# Patient Record
Sex: FEMALE | Race: WHITE | Employment: OTHER | URBAN - METROPOLITAN AREA
[De-identification: names, ages, dates, MRNs, and addresses within clinical notes are randomized per-mention and may not be internally consistent; named-entity substitution may affect disease eponyms.]

---

## 2017-01-12 ENCOUNTER — OFFICE VISIT (OUTPATIENT)
Dept: FAMILY MEDICINE CLINIC | Age: 62
End: 2017-01-12

## 2017-01-12 VITALS
TEMPERATURE: 98.4 F | HEART RATE: 92 BPM | WEIGHT: 207.4 LBS | BODY MASS INDEX: 33.48 KG/M2 | SYSTOLIC BLOOD PRESSURE: 124 MMHG | DIASTOLIC BLOOD PRESSURE: 78 MMHG | OXYGEN SATURATION: 97 %

## 2017-01-12 DIAGNOSIS — Z12.31 ENCOUNTER FOR SCREENING MAMMOGRAM FOR BREAST CANCER: ICD-10-CM

## 2017-01-12 DIAGNOSIS — R53.83 FATIGUE, UNSPECIFIED TYPE: ICD-10-CM

## 2017-01-12 DIAGNOSIS — F41.1 GENERALIZED ANXIETY DISORDER: ICD-10-CM

## 2017-01-12 DIAGNOSIS — Z13.1 SCREENING FOR DIABETES MELLITUS: ICD-10-CM

## 2017-01-12 DIAGNOSIS — Z13.820 SCREENING FOR OSTEOPOROSIS: ICD-10-CM

## 2017-01-12 DIAGNOSIS — F51.04 PSYCHOPHYSIOLOGICAL INSOMNIA: ICD-10-CM

## 2017-01-12 DIAGNOSIS — Z13.6 SCREENING FOR CARDIOVASCULAR CONDITION: ICD-10-CM

## 2017-01-12 DIAGNOSIS — G43.009 MIGRAINE WITHOUT AURA AND WITHOUT STATUS MIGRAINOSUS, NOT INTRACTABLE: Primary | ICD-10-CM

## 2017-01-12 PROCEDURE — 99214 OFFICE O/P EST MOD 30 MIN: CPT | Performed by: FAMILY MEDICINE

## 2017-01-12 RX ORDER — TRAZODONE HYDROCHLORIDE 50 MG/1
50 TABLET ORAL NIGHTLY
Qty: 30 TABLET | Refills: 5 | Status: SHIPPED | OUTPATIENT
Start: 2017-01-12 | End: 2017-07-07 | Stop reason: SDUPTHER

## 2017-01-12 RX ORDER — NADOLOL 20 MG/1
TABLET ORAL
Qty: 60 TABLET | Refills: 5 | Status: SHIPPED | OUTPATIENT
Start: 2017-01-12 | End: 2017-07-13 | Stop reason: SDUPTHER

## 2017-01-12 RX ORDER — LORAZEPAM 0.5 MG/1
0.5 TABLET ORAL EVERY 6 HOURS PRN
Qty: 30 TABLET | Refills: 0 | Status: SHIPPED | OUTPATIENT
Start: 2017-01-12 | End: 2017-10-17 | Stop reason: SDUPTHER

## 2017-01-12 RX ORDER — AMITRIPTYLINE HYDROCHLORIDE 25 MG/1
25 TABLET, FILM COATED ORAL NIGHTLY
Qty: 30 TABLET | Refills: 5 | Status: SHIPPED | OUTPATIENT
Start: 2017-01-12 | End: 2017-07-13 | Stop reason: SDUPTHER

## 2017-01-12 RX ORDER — SERTRALINE HYDROCHLORIDE 100 MG/1
100 TABLET, FILM COATED ORAL DAILY
Qty: 30 TABLET | Refills: 5 | Status: SHIPPED | OUTPATIENT
Start: 2017-01-12 | End: 2017-08-03 | Stop reason: SDUPTHER

## 2017-07-13 DIAGNOSIS — G43.009 MIGRAINE WITHOUT AURA AND WITHOUT STATUS MIGRAINOSUS, NOT INTRACTABLE: ICD-10-CM

## 2017-07-13 RX ORDER — AMITRIPTYLINE HYDROCHLORIDE 25 MG/1
25 TABLET, FILM COATED ORAL NIGHTLY
Qty: 30 TABLET | Refills: 0 | Status: SHIPPED | OUTPATIENT
Start: 2017-07-13 | End: 2017-08-03 | Stop reason: SDUPTHER

## 2017-07-13 RX ORDER — NADOLOL 20 MG/1
TABLET ORAL
Qty: 60 TABLET | Refills: 0 | Status: SHIPPED | OUTPATIENT
Start: 2017-07-13 | End: 2017-08-03 | Stop reason: SDUPTHER

## 2017-08-03 ENCOUNTER — OFFICE VISIT (OUTPATIENT)
Dept: FAMILY MEDICINE CLINIC | Age: 62
End: 2017-08-03

## 2017-08-03 VITALS
HEART RATE: 96 BPM | WEIGHT: 208.4 LBS | SYSTOLIC BLOOD PRESSURE: 122 MMHG | BODY MASS INDEX: 33.64 KG/M2 | DIASTOLIC BLOOD PRESSURE: 62 MMHG | TEMPERATURE: 98 F | OXYGEN SATURATION: 95 %

## 2017-08-03 DIAGNOSIS — R07.9 CHEST PAIN, UNSPECIFIED TYPE: ICD-10-CM

## 2017-08-03 DIAGNOSIS — Z11.59 NEED FOR HEPATITIS C SCREENING TEST: ICD-10-CM

## 2017-08-03 DIAGNOSIS — F51.04 PSYCHOPHYSIOLOGICAL INSOMNIA: ICD-10-CM

## 2017-08-03 DIAGNOSIS — I10 ESSENTIAL HYPERTENSION: Primary | ICD-10-CM

## 2017-08-03 DIAGNOSIS — G43.009 MIGRAINE WITHOUT AURA AND WITHOUT STATUS MIGRAINOSUS, NOT INTRACTABLE: ICD-10-CM

## 2017-08-03 DIAGNOSIS — F32.0 MILD SINGLE CURRENT EPISODE OF MAJOR DEPRESSIVE DISORDER (HCC): ICD-10-CM

## 2017-08-03 DIAGNOSIS — Z11.4 SCREENING FOR HIV (HUMAN IMMUNODEFICIENCY VIRUS): ICD-10-CM

## 2017-08-03 DIAGNOSIS — Z12.31 ENCOUNTER FOR SCREENING MAMMOGRAM FOR BREAST CANCER: ICD-10-CM

## 2017-08-03 DIAGNOSIS — Z13.6 SCREENING FOR CARDIOVASCULAR CONDITION: ICD-10-CM

## 2017-08-03 DIAGNOSIS — E66.01 MORBID OBESITY DUE TO EXCESS CALORIES (HCC): ICD-10-CM

## 2017-08-03 PROCEDURE — 99214 OFFICE O/P EST MOD 30 MIN: CPT | Performed by: FAMILY MEDICINE

## 2017-08-03 RX ORDER — AMITRIPTYLINE HYDROCHLORIDE 25 MG/1
25 TABLET, FILM COATED ORAL NIGHTLY
Qty: 30 TABLET | Refills: 5 | Status: SHIPPED | OUTPATIENT
Start: 2017-08-03 | End: 2018-02-07 | Stop reason: SDUPTHER

## 2017-08-03 RX ORDER — TRAZODONE HYDROCHLORIDE 50 MG/1
TABLET ORAL
Qty: 30 TABLET | Refills: 5 | Status: SHIPPED | OUTPATIENT
Start: 2017-08-03 | End: 2018-02-13 | Stop reason: SDUPTHER

## 2017-08-03 RX ORDER — KETOROLAC TROMETHAMINE 10 MG/1
10 TABLET, FILM COATED ORAL EVERY 6 HOURS PRN
Qty: 30 TABLET | Refills: 5 | Status: SHIPPED | OUTPATIENT
Start: 2017-08-03 | End: 2018-02-13 | Stop reason: SDUPTHER

## 2017-08-03 RX ORDER — SERTRALINE HYDROCHLORIDE 100 MG/1
100 TABLET, FILM COATED ORAL DAILY
Qty: 30 TABLET | Refills: 5 | Status: SHIPPED | OUTPATIENT
Start: 2017-08-03 | End: 2018-02-13 | Stop reason: SDUPTHER

## 2017-08-03 RX ORDER — NADOLOL 20 MG/1
TABLET ORAL
Qty: 60 TABLET | Refills: 5 | Status: SHIPPED | OUTPATIENT
Start: 2017-08-03 | End: 2018-02-07 | Stop reason: SDUPTHER

## 2017-08-03 ASSESSMENT — PATIENT HEALTH QUESTIONNAIRE - PHQ9
1. LITTLE INTEREST OR PLEASURE IN DOING THINGS: 0
SUM OF ALL RESPONSES TO PHQ QUESTIONS 1-9: 0
SUM OF ALL RESPONSES TO PHQ9 QUESTIONS 1 & 2: 0
2. FEELING DOWN, DEPRESSED OR HOPELESS: 0

## 2017-08-04 LAB
COPY(IES) SENT TO:: NORMAL
HEPATITIS C ANTIBODY: NEGATIVE
HIV AG/AB: NORMAL

## 2017-10-06 ENCOUNTER — TELEPHONE (OUTPATIENT)
Dept: FAMILY MEDICINE CLINIC | Age: 62
End: 2017-10-06

## 2017-10-09 NOTE — TELEPHONE ENCOUNTER
The last time she was prescribed lorazepam was was 1/12/17. I cannot refill that without an appointment.

## 2017-10-17 ENCOUNTER — OFFICE VISIT (OUTPATIENT)
Dept: FAMILY MEDICINE CLINIC | Age: 62
End: 2017-10-17

## 2017-10-17 VITALS
HEIGHT: 63 IN | TEMPERATURE: 98.2 F | HEART RATE: 74 BPM | OXYGEN SATURATION: 98 % | BODY MASS INDEX: 36.91 KG/M2 | WEIGHT: 208.34 LBS | DIASTOLIC BLOOD PRESSURE: 88 MMHG | SYSTOLIC BLOOD PRESSURE: 134 MMHG

## 2017-10-17 DIAGNOSIS — L50.9 URTICARIA: ICD-10-CM

## 2017-10-17 DIAGNOSIS — Z48.89 SUTURE CHECK: Primary | ICD-10-CM

## 2017-10-17 DIAGNOSIS — F41.1 GENERALIZED ANXIETY DISORDER: ICD-10-CM

## 2017-10-17 PROCEDURE — S0630 REMOVAL OF SUTURES: HCPCS | Performed by: FAMILY MEDICINE

## 2017-10-17 PROCEDURE — 99214 OFFICE O/P EST MOD 30 MIN: CPT | Performed by: FAMILY MEDICINE

## 2017-10-17 RX ORDER — HYDROXYZINE HYDROCHLORIDE 10 MG/1
10-30 TABLET, FILM COATED ORAL 3 TIMES DAILY PRN
Qty: 90 TABLET | Refills: 0 | Status: SHIPPED | OUTPATIENT
Start: 2017-10-17 | End: 2019-05-02 | Stop reason: SDUPTHER

## 2017-10-17 RX ORDER — LORAZEPAM 0.5 MG/1
0.5 TABLET ORAL EVERY 6 HOURS PRN
Qty: 30 TABLET | Refills: 0 | Status: SHIPPED | OUTPATIENT
Start: 2017-10-17 | End: 2017-11-16

## 2017-10-17 ASSESSMENT — PATIENT HEALTH QUESTIONNAIRE - PHQ9
SUM OF ALL RESPONSES TO PHQ9 QUESTIONS 1 & 2: 0
SUM OF ALL RESPONSES TO PHQ QUESTIONS 1-9: 0
2. FEELING DOWN, DEPRESSED OR HOPELESS: 0
1. LITTLE INTEREST OR PLEASURE IN DOING THINGS: 0

## 2017-10-17 NOTE — PATIENT INSTRUCTIONS
social groups, or volunteer to help others. Being alone sometimes makes things seem worse than they are. · Get at least 30 minutes of exercise on most days of the week to relieve stress. Walking is a good choice. You also may want to do other activities, such as running, swimming, cycling, or playing tennis or team sports. Relaxation techniques  Do relaxation exercises 10 to 20 minutes a day. You can play soothing, relaxing music while you do them, if you wish. · Tell others in your house that you are going to do your relaxation exercises. Ask them not to disturb you. · Find a comfortable place, away from all distractions and noise. · Lie down on your back, or sit with your back straight. · Focus on your breathing. Make it slow and steady. · Breathe in through your nose. Breathe out through either your nose or mouth. · Breathe deeply, filling up the area between your navel and your rib cage. Breathe so that your belly goes up and down. · Do not hold your breath. · Breathe like this for 5 to 10 minutes. Notice the feeling of calmness throughout your whole body. As you continue to breathe slowly and deeply, relax by doing the following for another 5 to 10 minutes:  · Tighten and relax each muscle group in your body. You can begin at your toes and work your way up to your head. · Imagine your muscle groups relaxing and becoming heavy. · Empty your mind of all thoughts. · Let yourself relax more and more deeply. · Become aware of the state of calmness that surrounds you. · When your relaxation time is over, you can bring yourself back to alertness by moving your fingers and toes and then your hands and feet and then stretching and moving your entire body. Sometimes people fall asleep during relaxation, but they usually wake up shortly afterward. · Always give yourself time to return to full alertness before you drive a car or do anything that might cause an accident if you are not fully alert.  Never play a relaxation tape while you drive a car. When should you call for help? Call 911 anytime you think you may need emergency care. For example, call if:  · You feel you cannot stop from hurting yourself or someone else. Keep the numbers for these national suicide hotlines: 2-570-657-TALK (2-363.947.8625) and 5-076-IAOGMOS (3-391.608.4108). If you or someone you know talks about suicide or feeling hopeless, get help right away. Watch closely for changes in your health, and be sure to contact your doctor if:  · You have anxiety or fear that affects your life. · You have symptoms of anxiety that are new or different from those you had before. Where can you learn more? Go to https://Muzicall.Pfenex. org and sign in to your [employer] account. Enter P754 in the Retrevo box to learn more about \"Anxiety Disorder: Care Instructions. \"     If you do not have an account, please click on the \"Sign Up Now\" link. Current as of: July 26, 2016  Content Version: 11.3  © 3282-0977 [address]. Care instructions adapted under license by [facility] ([facility]). If you have questions about a medical condition or this instruction, always ask your healthcare professional. [address] any warranty or liability for your use of this information.

## 2017-12-22 RX ORDER — LORAZEPAM 0.5 MG/1
0.5 TABLET ORAL EVERY 6 HOURS PRN
Qty: 30 TABLET | Refills: 0 | Status: SHIPPED | OUTPATIENT
Start: 2017-12-22 | End: 2018-02-13 | Stop reason: SDUPTHER

## 2018-02-07 DIAGNOSIS — G43.009 MIGRAINE WITHOUT AURA AND WITHOUT STATUS MIGRAINOSUS, NOT INTRACTABLE: ICD-10-CM

## 2018-02-07 DIAGNOSIS — I10 ESSENTIAL HYPERTENSION: ICD-10-CM

## 2018-02-07 RX ORDER — NADOLOL 20 MG/1
TABLET ORAL
Qty: 60 TABLET | Refills: 0 | Status: SHIPPED | OUTPATIENT
Start: 2018-02-07 | End: 2018-02-13 | Stop reason: SDUPTHER

## 2018-02-07 RX ORDER — LORAZEPAM 0.5 MG/1
0.5 TABLET ORAL EVERY 6 HOURS PRN
Qty: 30 TABLET | Refills: 0 | OUTPATIENT
Start: 2018-02-07 | End: 2018-03-09

## 2018-02-07 RX ORDER — AMITRIPTYLINE HYDROCHLORIDE 25 MG/1
25 TABLET, FILM COATED ORAL NIGHTLY
Qty: 30 TABLET | Refills: 0 | Status: SHIPPED | OUTPATIENT
Start: 2018-02-07 | End: 2018-02-13 | Stop reason: SDUPTHER

## 2018-02-13 ENCOUNTER — OFFICE VISIT (OUTPATIENT)
Dept: FAMILY MEDICINE CLINIC | Age: 63
End: 2018-02-13

## 2018-02-13 VITALS
HEART RATE: 94 BPM | SYSTOLIC BLOOD PRESSURE: 128 MMHG | RESPIRATION RATE: 14 BRPM | HEIGHT: 63 IN | WEIGHT: 207 LBS | DIASTOLIC BLOOD PRESSURE: 68 MMHG | BODY MASS INDEX: 36.68 KG/M2 | TEMPERATURE: 97.6 F | OXYGEN SATURATION: 98 %

## 2018-02-13 DIAGNOSIS — F41.9 ANXIETY: ICD-10-CM

## 2018-02-13 DIAGNOSIS — Z13.6 SCREENING FOR CARDIOVASCULAR CONDITION: ICD-10-CM

## 2018-02-13 DIAGNOSIS — Z13.1 SCREENING FOR DIABETES MELLITUS: ICD-10-CM

## 2018-02-13 DIAGNOSIS — F32.0 MILD SINGLE CURRENT EPISODE OF MAJOR DEPRESSIVE DISORDER (HCC): Primary | ICD-10-CM

## 2018-02-13 DIAGNOSIS — G43.009 MIGRAINE WITHOUT AURA AND WITHOUT STATUS MIGRAINOSUS, NOT INTRACTABLE: ICD-10-CM

## 2018-02-13 DIAGNOSIS — I10 ESSENTIAL HYPERTENSION: ICD-10-CM

## 2018-02-13 DIAGNOSIS — F51.04 PSYCHOPHYSIOLOGICAL INSOMNIA: ICD-10-CM

## 2018-02-13 DIAGNOSIS — Z12.31 ENCOUNTER FOR SCREENING MAMMOGRAM FOR BREAST CANCER: ICD-10-CM

## 2018-02-13 PROCEDURE — 99214 OFFICE O/P EST MOD 30 MIN: CPT | Performed by: FAMILY MEDICINE

## 2018-02-13 RX ORDER — AMITRIPTYLINE HYDROCHLORIDE 25 MG/1
25 TABLET, FILM COATED ORAL NIGHTLY
Qty: 30 TABLET | Refills: 5 | Status: SHIPPED | OUTPATIENT
Start: 2018-02-13 | End: 2018-05-25 | Stop reason: SDUPTHER

## 2018-02-13 RX ORDER — SERTRALINE HYDROCHLORIDE 100 MG/1
200 TABLET, FILM COATED ORAL DAILY
Qty: 60 TABLET | Refills: 5 | Status: SHIPPED | OUTPATIENT
Start: 2018-02-13 | End: 2018-05-25 | Stop reason: SDUPTHER

## 2018-02-13 RX ORDER — NADOLOL 20 MG/1
TABLET ORAL
Qty: 60 TABLET | Refills: 5 | Status: SHIPPED | OUTPATIENT
Start: 2018-02-13 | End: 2018-05-25 | Stop reason: SDUPTHER

## 2018-02-13 RX ORDER — TRAZODONE HYDROCHLORIDE 50 MG/1
TABLET ORAL
Qty: 30 TABLET | Refills: 5 | Status: SHIPPED | OUTPATIENT
Start: 2018-02-13 | End: 2018-05-25 | Stop reason: SDUPTHER

## 2018-02-13 RX ORDER — KETOROLAC TROMETHAMINE 10 MG/1
10 TABLET, FILM COATED ORAL EVERY 6 HOURS PRN
Qty: 30 TABLET | Refills: 5 | Status: SHIPPED | OUTPATIENT
Start: 2018-02-13 | End: 2018-05-25 | Stop reason: SDUPTHER

## 2018-02-13 RX ORDER — LORAZEPAM 0.5 MG/1
0.5 TABLET ORAL EVERY 6 HOURS PRN
Qty: 30 TABLET | Refills: 0 | Status: SHIPPED | OUTPATIENT
Start: 2018-02-13 | End: 2018-04-06 | Stop reason: SDUPTHER

## 2018-02-13 ASSESSMENT — PATIENT HEALTH QUESTIONNAIRE - PHQ9
SUM OF ALL RESPONSES TO PHQ9 QUESTIONS 1 & 2: 0
1. LITTLE INTEREST OR PLEASURE IN DOING THINGS: 0
2. FEELING DOWN, DEPRESSED OR HOPELESS: 0
SUM OF ALL RESPONSES TO PHQ QUESTIONS 1-9: 0

## 2018-02-14 NOTE — PROGRESS NOTES
Position: Sitting, Cuff Size: Large Adult)   Pulse 94   Temp 97.6 °F (36.4 °C) (Temporal)   Resp 14   Ht 5' 3\" (1.6 m)   Wt 207 lb (93.9 kg)   SpO2 98%   BMI 36.67 kg/m²    General:  alert, appears stated age and cooperative   Neck: Thyroid not palpable, not enlarged, no nodules detected. Chest: clear with no wheezes or rales. No retractions, or use of accessory muscles noted. Cardiovascular: PMI is not displaced, and no thrill noted. Regular rate and rhythm with no rub, murmur or gallop. No peripheral edema. Pedal pulses are normal.    Affect & Behavior:  full facial expressions, good grooming, good insight, normal perception, normal reasoning, normal speech pattern and content and normal thought patterns        Assessment:         1. Mild single current episode of major depressive disorder (HCC)  sertraline (ZOLOFT) 100 MG tablet   2. Anxiety     3. Psychophysiological insomnia  traZODone (DESYREL) 50 MG tablet   4. Migraine without aura and without status migrainosus, not intractable  ketorolac (TORADOL) 10 MG tablet    amitriptyline (ELAVIL) 25 MG tablet   5. Essential hypertension  nadolol (CORGARD) 20 MG tablet   6. Screening for cardiovascular condition  Lipid Panel   7. Screening for diabetes mellitus  Comprehensive Metabolic Panel   8. Encounter for screening mammogram for breast cancer  Mammography screening bilateral         Plan:      Medications: Elavil. Recommended counseling. Reviewed concept of anxiety as biochemical imbalance of neurotransmitters and rationale for treatment. Instructed patient to contact office or on-call physician promptly should condition worsen or any new symptoms appear and provided on-call telephone numbers. IF THE PATIENT HAS ANY SUICIDAL OR HOMICIDAL IDEATIONS, CALL THE OFFICE, DISCUSS WITH A SUPPORT MEMBER, OR GO TO THE ER IMMEDIATELY. Patient was agreeable with this plan. Follow up: 6 months.      Favian Gurrola received counseling on the following healthy

## 2018-04-06 ENCOUNTER — TELEPHONE (OUTPATIENT)
Dept: FAMILY MEDICINE CLINIC | Age: 63
End: 2018-04-06

## 2018-04-06 RX ORDER — LORAZEPAM 0.5 MG/1
0.5 TABLET ORAL EVERY 6 HOURS PRN
Qty: 30 TABLET | Refills: 1 | Status: SHIPPED | OUTPATIENT
Start: 2018-04-06 | End: 2018-05-25 | Stop reason: SDUPTHER

## 2018-05-25 ENCOUNTER — OFFICE VISIT (OUTPATIENT)
Dept: FAMILY MEDICINE CLINIC | Age: 63
End: 2018-05-25

## 2018-05-25 VITALS
DIASTOLIC BLOOD PRESSURE: 68 MMHG | WEIGHT: 205.8 LBS | HEART RATE: 74 BPM | BODY MASS INDEX: 36.46 KG/M2 | TEMPERATURE: 98.4 F | OXYGEN SATURATION: 98 % | SYSTOLIC BLOOD PRESSURE: 102 MMHG

## 2018-05-25 DIAGNOSIS — F41.9 ANXIETY: ICD-10-CM

## 2018-05-25 DIAGNOSIS — I10 ESSENTIAL HYPERTENSION: Primary | ICD-10-CM

## 2018-05-25 DIAGNOSIS — G43.009 MIGRAINE WITHOUT AURA AND WITHOUT STATUS MIGRAINOSUS, NOT INTRACTABLE: ICD-10-CM

## 2018-05-25 DIAGNOSIS — Z13.6 SCREENING FOR CARDIOVASCULAR CONDITION: ICD-10-CM

## 2018-05-25 DIAGNOSIS — F51.04 PSYCHOPHYSIOLOGICAL INSOMNIA: ICD-10-CM

## 2018-05-25 DIAGNOSIS — Z91.81 AT HIGH RISK FOR FALLS: ICD-10-CM
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
## 2018-05-25 DIAGNOSIS — R53.1 WEAKNESS: ICD-10-CM

## 2018-05-25 DIAGNOSIS — F32.0 MILD SINGLE CURRENT EPISODE OF MAJOR DEPRESSIVE DISORDER (HCC): ICD-10-CM

## 2018-05-25 PROCEDURE — 1123F ACP DISCUSS/DSCN MKR DOCD: CPT | Performed by: FAMILY MEDICINE

## 2018-05-25 PROCEDURE — G8400 PT W/DXA NO RESULTS DOC: HCPCS | Performed by: FAMILY MEDICINE

## 2018-05-25 PROCEDURE — 36415 COLL VENOUS BLD VENIPUNCTURE: CPT | Performed by: FAMILY MEDICINE

## 2018-05-25 PROCEDURE — 4040F PNEUMOC VAC/ADMIN/RCVD: CPT | Performed by: FAMILY MEDICINE

## 2018-05-25 PROCEDURE — 99214 OFFICE O/P EST MOD 30 MIN: CPT | Performed by: FAMILY MEDICINE

## 2018-05-25 PROCEDURE — 1090F PRES/ABSN URINE INCON ASSESS: CPT | Performed by: FAMILY MEDICINE

## 2018-05-25 PROCEDURE — G8427 DOCREV CUR MEDS BY ELIG CLIN: HCPCS | Performed by: FAMILY MEDICINE

## 2018-05-25 PROCEDURE — 1036F TOBACCO NON-USER: CPT | Performed by: FAMILY MEDICINE

## 2018-05-25 PROCEDURE — G8417 CALC BMI ABV UP PARAM F/U: HCPCS | Performed by: FAMILY MEDICINE

## 2018-05-25 PROCEDURE — 3017F COLORECTAL CA SCREEN DOC REV: CPT | Performed by: FAMILY MEDICINE

## 2018-05-25 RX ORDER — AMITRIPTYLINE HYDROCHLORIDE 25 MG/1
25 TABLET, FILM COATED ORAL NIGHTLY
Qty: 30 TABLET | Refills: 5 | Status: SHIPPED | OUTPATIENT
Start: 2018-05-25 | End: 2018-10-12 | Stop reason: SDUPTHER

## 2018-05-25 RX ORDER — SERTRALINE HYDROCHLORIDE 100 MG/1
200 TABLET, FILM COATED ORAL DAILY
Qty: 60 TABLET | Refills: 5 | Status: SHIPPED | OUTPATIENT
Start: 2018-05-25 | End: 2018-10-12 | Stop reason: SDUPTHER

## 2018-05-25 RX ORDER — NADOLOL 20 MG/1
TABLET ORAL
Qty: 60 TABLET | Refills: 5 | Status: SHIPPED | OUTPATIENT
Start: 2018-05-25 | End: 2018-10-12 | Stop reason: SDUPTHER

## 2018-05-25 RX ORDER — LORAZEPAM 0.5 MG/1
0.5 TABLET ORAL EVERY 6 HOURS PRN
Qty: 30 TABLET | Refills: 1 | Status: SHIPPED | OUTPATIENT
Start: 2018-05-25 | End: 2018-10-12 | Stop reason: SDUPTHER

## 2018-05-25 RX ORDER — KETOROLAC TROMETHAMINE 10 MG/1
10 TABLET, FILM COATED ORAL EVERY 6 HOURS PRN
Qty: 30 TABLET | Refills: 5 | Status: SHIPPED | OUTPATIENT
Start: 2018-05-25 | End: 2018-10-12 | Stop reason: SDUPTHER

## 2018-05-25 RX ORDER — MIRTAZAPINE 15 MG/1
15 TABLET, FILM COATED ORAL NIGHTLY
Qty: 30 TABLET | Refills: 5 | Status: SHIPPED | OUTPATIENT
Start: 2018-05-25 | End: 2018-10-12 | Stop reason: SDUPTHER

## 2018-05-25 RX ORDER — TRAZODONE HYDROCHLORIDE 50 MG/1
TABLET ORAL
Qty: 30 TABLET | Refills: 5 | Status: SHIPPED | OUTPATIENT
Start: 2018-05-25 | End: 2018-10-12 | Stop reason: SDUPTHER

## 2018-05-25 ASSESSMENT — PATIENT HEALTH QUESTIONNAIRE - PHQ9
2. FEELING DOWN, DEPRESSED OR HOPELESS: 0
1. LITTLE INTEREST OR PLEASURE IN DOING THINGS: 0
SUM OF ALL RESPONSES TO PHQ9 QUESTIONS 1 & 2: 0
SUM OF ALL RESPONSES TO PHQ QUESTIONS 1-9: 0

## 2018-05-26 LAB
A/G RATIO: 1.5 (CALC) (ref 0.8–2.6)
ALBUMIN SERPL-MCNC: 4.1 GM/DL (ref 3.5–5.2)
ALP BLD-CCNC: 80 U/L (ref 23–144)
ALT SERPL-CCNC: 50 U/L (ref 0–60)
AST SERPL-CCNC: 85 U/L (ref 0–46)
BASOPHILS ABSOLUTE: 0.1 K/MM3 (ref 0–0.3)
BASOPHILS RELATIVE PERCENT: 0.7 % (ref 0–2)
BILIRUB SERPL-MCNC: 0.5 MG/DL (ref 0–1.2)
BUN / CREAT RATIO: 12 (CALC) (ref 7–25)
BUN BLDV-MCNC: 11 MG/DL (ref 3–29)
CALCIUM SERPL-MCNC: 8.8 MG/DL (ref 8.5–10.5)
CHLORIDE BLD-SCNC: 102 MEQ/L (ref 96–110)
CHOLESTEROL, TOTAL: 185 MG/DL
CO2: 23 MEQ/L (ref 19–32)
COPY(IES) SENT TO:: NORMAL
CREAT SERPL-MCNC: 0.9 MG/DL
EOSINOPHILS ABSOLUTE: 0.5 K/MM3 (ref 0–0.6)
EOSINOPHILS RELATIVE PERCENT: 5.5 % (ref 0–7)
GFR SERPL CREATININE-BSD FRML MDRD: 67 ML/MIN/1.73M2
GLOBULIN: 2.8 GM/DL (CALC) (ref 1.9–3.6)
GLUCOSE BLD-MCNC: 105 MG/DL
HCT VFR BLD CALC: 39.2 % (ref 35–46)
HDLC SERPL-MCNC: 45 MG/DL
HEMOGLOBIN: 13 G/DL (ref 12–15.6)
LDL CHOLESTEROL: 113 MG/DL (CALC)
LEUKOCYTES, UA: 9.8 K/MM3 (ref 3.8–10.8)
LYMPHOCYTES ABSOLUTE: 2.4 K/MM3 (ref 0.9–4.1)
LYMPHOCYTES RELATIVE PERCENT: 24.2 % (ref 18–47)
MCH RBC QN AUTO: 29.3 PG (ref 27–33)
MCHC RBC AUTO-ENTMCNC: 33.2 G/DL (ref 32–36)
MCV RBC AUTO: 88.1 FL (ref 80–100)
MONOCYTES ABSOLUTE: 0.6 K/MM3 (ref 0.2–1.1)
MONOCYTES RELATIVE PERCENT: 5.9 % (ref 0–14)
NEUTROPHILS ABSOLUTE: 6.2 K/MM3 (ref 1.5–7.8)
PDW BLD-RTO: 14.4 % (ref 9–15)
PLATELET # BLD: 251 K/MM3 (ref 130–400)
POTASSIUM SERPL-SCNC: 3.5 MEQ/L (ref 3.4–5.3)
RBC # BLD: 4.45 M/MM3 (ref 3.9–5.2)
SEGMENTED NEUTROPHILS RELATIVE PERCENT: 63.7 % (ref 40–75)
SODIUM BLD-SCNC: 139 MEQ/L (ref 135–148)
TOTAL PROTEIN: 6.9 GM/DL (ref 6–8.3)
TRIGL SERPL-MCNC: 137 MG/DL
TSH SERPL DL<=0.05 MIU/L-ACNC: 52.41 MICRO IU/ML (ref 0.4–4)
VLDLC SERPL CALC-MCNC: 27 MG/DL (CALC) (ref 4–38)

## 2018-05-30 DIAGNOSIS — E03.9 ACQUIRED HYPOTHYROIDISM: Primary | ICD-10-CM

## 2018-05-30 RX ORDER — LEVOTHYROXINE SODIUM 0.07 MG/1
75 TABLET ORAL DAILY
Qty: 30 TABLET | Refills: 1 | Status: SHIPPED | OUTPATIENT
Start: 2018-05-30 | End: 2018-10-12

## 2018-07-31 DIAGNOSIS — E03.9 ACQUIRED HYPOTHYROIDISM: Primary | ICD-10-CM

## 2018-07-31 RX ORDER — LEVOTHYROXINE SODIUM 0.12 MG/1
125 TABLET ORAL DAILY
Qty: 30 TABLET | Refills: 1 | Status: SHIPPED | OUTPATIENT
Start: 2018-07-31 | End: 2018-10-10 | Stop reason: SDUPTHER

## 2018-10-08 ENCOUNTER — NURSE ONLY (OUTPATIENT)
Dept: FAMILY MEDICINE CLINIC | Age: 63
End: 2018-10-08

## 2018-10-08 DIAGNOSIS — E03.9 ACQUIRED HYPOTHYROIDISM: ICD-10-CM

## 2018-10-10 ENCOUNTER — OFFICE VISIT (OUTPATIENT)
Dept: FAMILY MEDICINE CLINIC | Age: 63
End: 2018-10-10
Payer: COMMERCIAL

## 2018-10-10 VITALS
WEIGHT: 213.6 LBS | BODY MASS INDEX: 37.84 KG/M2 | DIASTOLIC BLOOD PRESSURE: 72 MMHG | SYSTOLIC BLOOD PRESSURE: 124 MMHG | TEMPERATURE: 97.6 F | HEART RATE: 92 BPM | OXYGEN SATURATION: 92 %

## 2018-10-10 DIAGNOSIS — G43.009 MIGRAINE WITHOUT AURA AND WITHOUT STATUS MIGRAINOSUS, NOT INTRACTABLE: ICD-10-CM

## 2018-10-10 DIAGNOSIS — F51.04 PSYCHOPHYSIOLOGICAL INSOMNIA: ICD-10-CM

## 2018-10-10 DIAGNOSIS — I10 ESSENTIAL HYPERTENSION: ICD-10-CM

## 2018-10-10 DIAGNOSIS — E03.9 ACQUIRED HYPOTHYROIDISM: Primary | ICD-10-CM

## 2018-10-10 DIAGNOSIS — J44.1 CHRONIC OBSTRUCTIVE PULMONARY DISEASE WITH ACUTE EXACERBATION (HCC): ICD-10-CM

## 2018-10-10 DIAGNOSIS — J44.9 CHRONIC OBSTRUCTIVE PULMONARY DISEASE, UNSPECIFIED COPD TYPE (HCC): ICD-10-CM

## 2018-10-10 PROCEDURE — 99214 OFFICE O/P EST MOD 30 MIN: CPT | Performed by: FAMILY MEDICINE

## 2018-10-10 RX ORDER — LEVOTHYROXINE SODIUM 175 UG/1
175 TABLET ORAL DAILY
Qty: 30 TABLET | Refills: 1 | Status: SHIPPED | OUTPATIENT
Start: 2018-10-10 | End: 2018-12-10 | Stop reason: SDUPTHER

## 2018-10-10 RX ORDER — NADOLOL 20 MG/1
TABLET ORAL
Qty: 60 TABLET | Refills: 5 | Status: SHIPPED | OUTPATIENT
Start: 2018-10-10 | End: 2019-02-06 | Stop reason: ALTCHOICE

## 2018-10-10 RX ORDER — TRAZODONE HYDROCHLORIDE 50 MG/1
TABLET ORAL
Qty: 30 TABLET | Refills: 5 | Status: SHIPPED | OUTPATIENT
Start: 2018-10-10 | End: 2019-02-06 | Stop reason: SDUPTHER

## 2018-10-10 RX ORDER — AMITRIPTYLINE HYDROCHLORIDE 25 MG/1
25 TABLET, FILM COATED ORAL NIGHTLY
Qty: 30 TABLET | Refills: 5 | Status: SHIPPED | OUTPATIENT
Start: 2018-10-10 | End: 2019-02-06 | Stop reason: SDUPTHER

## 2018-10-10 RX ORDER — BUDESONIDE AND FORMOTEROL FUMARATE DIHYDRATE 160; 4.5 UG/1; UG/1
2 AEROSOL RESPIRATORY (INHALATION) 2 TIMES DAILY
COMMUNITY
End: 2018-12-24 | Stop reason: SDUPTHER

## 2018-10-10 RX ORDER — LEVOFLOXACIN 500 MG/1
500 TABLET, FILM COATED ORAL DAILY
Qty: 10 TABLET | Refills: 0 | Status: SHIPPED | OUTPATIENT
Start: 2018-10-10 | End: 2018-12-24

## 2018-10-10 ASSESSMENT — PATIENT HEALTH QUESTIONNAIRE - PHQ9
SUM OF ALL RESPONSES TO PHQ QUESTIONS 1-9: 0
2. FEELING DOWN, DEPRESSED OR HOPELESS: 0
1. LITTLE INTEREST OR PLEASURE IN DOING THINGS: 0
SUM OF ALL RESPONSES TO PHQ QUESTIONS 1-9: 0
SUM OF ALL RESPONSES TO PHQ9 QUESTIONS 1 & 2: 0

## 2018-10-12 DIAGNOSIS — G43.009 MIGRAINE WITHOUT AURA AND WITHOUT STATUS MIGRAINOSUS, NOT INTRACTABLE: ICD-10-CM

## 2018-10-12 DIAGNOSIS — F32.0 MILD SINGLE CURRENT EPISODE OF MAJOR DEPRESSIVE DISORDER (HCC): ICD-10-CM

## 2018-10-12 DIAGNOSIS — I10 ESSENTIAL HYPERTENSION: ICD-10-CM

## 2018-10-12 DIAGNOSIS — F41.9 ANXIETY: ICD-10-CM

## 2018-10-12 DIAGNOSIS — F51.04 PSYCHOPHYSIOLOGICAL INSOMNIA: ICD-10-CM

## 2018-10-12 RX ORDER — SERTRALINE HYDROCHLORIDE 100 MG/1
200 TABLET, FILM COATED ORAL DAILY
Qty: 60 TABLET | Refills: 5 | Status: SHIPPED | OUTPATIENT
Start: 2018-10-12 | End: 2019-05-02 | Stop reason: SDUPTHER

## 2018-10-12 RX ORDER — AMITRIPTYLINE HYDROCHLORIDE 25 MG/1
25 TABLET, FILM COATED ORAL NIGHTLY
Qty: 30 TABLET | Refills: 5 | Status: SHIPPED | OUTPATIENT
Start: 2018-10-12 | End: 2019-05-02 | Stop reason: SDUPTHER

## 2018-10-12 RX ORDER — KETOROLAC TROMETHAMINE 10 MG/1
10 TABLET, FILM COATED ORAL EVERY 6 HOURS PRN
Qty: 30 TABLET | Refills: 5 | Status: SHIPPED | OUTPATIENT
Start: 2018-10-12 | End: 2019-05-02 | Stop reason: SDUPTHER

## 2018-10-12 RX ORDER — LORAZEPAM 0.5 MG/1
0.5 TABLET ORAL EVERY 6 HOURS PRN
Qty: 30 TABLET | Refills: 1 | Status: SHIPPED | OUTPATIENT
Start: 2018-10-12 | End: 2018-12-18 | Stop reason: SDUPTHER

## 2018-10-12 RX ORDER — MIRTAZAPINE 15 MG/1
15 TABLET, FILM COATED ORAL NIGHTLY
Qty: 30 TABLET | Refills: 5 | Status: SHIPPED | OUTPATIENT
Start: 2018-10-12 | End: 2018-12-24 | Stop reason: SDUPTHER

## 2018-10-12 RX ORDER — TRAZODONE HYDROCHLORIDE 50 MG/1
TABLET ORAL
Qty: 30 TABLET | Refills: 5 | Status: SHIPPED | OUTPATIENT
Start: 2018-10-12 | End: 2019-05-02 | Stop reason: SDUPTHER

## 2018-10-12 RX ORDER — NADOLOL 20 MG/1
TABLET ORAL
Qty: 60 TABLET | Refills: 5 | Status: SHIPPED | OUTPATIENT
Start: 2018-10-12 | End: 2019-05-02 | Stop reason: SDUPTHER

## 2018-10-15 LAB — TSH SERPL DL<=0.05 MIU/L-ACNC: 9.33 UIU/ML

## 2018-12-04 ENCOUNTER — TELEPHONE (OUTPATIENT)
Dept: FAMILY MEDICINE CLINIC | Age: 63
End: 2018-12-04

## 2018-12-04 DIAGNOSIS — J44.9 CHRONIC OBSTRUCTIVE PULMONARY DISEASE, UNSPECIFIED COPD TYPE (HCC): Primary | ICD-10-CM

## 2018-12-10 DIAGNOSIS — E03.9 ACQUIRED HYPOTHYROIDISM: ICD-10-CM

## 2018-12-12 RX ORDER — LEVOTHYROXINE SODIUM 175 UG/1
TABLET ORAL
Qty: 30 TABLET | Refills: 1 | Status: SHIPPED | OUTPATIENT
Start: 2018-12-12 | End: 2018-12-24 | Stop reason: SDUPTHER

## 2018-12-13 LAB — T4 TOTAL: 1.31

## 2018-12-24 ENCOUNTER — OFFICE VISIT (OUTPATIENT)
Dept: FAMILY MEDICINE CLINIC | Age: 63
End: 2018-12-24
Payer: COMMERCIAL

## 2018-12-24 VITALS
WEIGHT: 205.6 LBS | BODY MASS INDEX: 36.42 KG/M2 | SYSTOLIC BLOOD PRESSURE: 124 MMHG | HEART RATE: 93 BPM | TEMPERATURE: 96.9 F | DIASTOLIC BLOOD PRESSURE: 76 MMHG | OXYGEN SATURATION: 95 %

## 2018-12-24 DIAGNOSIS — E03.9 ACQUIRED HYPOTHYROIDISM: ICD-10-CM

## 2018-12-24 DIAGNOSIS — F41.9 ANXIETY: ICD-10-CM

## 2018-12-24 DIAGNOSIS — J44.1 CHRONIC OBSTRUCTIVE PULMONARY DISEASE WITH ACUTE EXACERBATION (HCC): Primary | ICD-10-CM

## 2018-12-24 PROCEDURE — 99214 OFFICE O/P EST MOD 30 MIN: CPT | Performed by: FAMILY MEDICINE

## 2018-12-24 RX ORDER — MIRTAZAPINE 30 MG/1
30 TABLET, FILM COATED ORAL NIGHTLY
Qty: 30 TABLET | Refills: 3 | Status: SHIPPED | OUTPATIENT
Start: 2018-12-24 | End: 2019-04-14 | Stop reason: SDUPTHER

## 2018-12-24 RX ORDER — BUDESONIDE AND FORMOTEROL FUMARATE DIHYDRATE 160; 4.5 UG/1; UG/1
2 AEROSOL RESPIRATORY (INHALATION) 2 TIMES DAILY
Qty: 1 INHALER | Refills: 3 | Status: SHIPPED | OUTPATIENT
Start: 2018-12-24 | End: 2019-03-01

## 2018-12-24 RX ORDER — LEVOTHYROXINE SODIUM 0.15 MG/1
TABLET ORAL
Qty: 30 TABLET | Refills: 3 | Status: SHIPPED | OUTPATIENT
Start: 2018-12-24 | End: 2019-05-02 | Stop reason: SDUPTHER

## 2018-12-24 RX ORDER — LORAZEPAM 0.5 MG/1
0.5 TABLET ORAL EVERY 6 HOURS PRN
Qty: 30 TABLET | Refills: 1 | Status: SHIPPED | OUTPATIENT
Start: 2018-12-24 | End: 2019-01-23

## 2018-12-24 ASSESSMENT — PATIENT HEALTH QUESTIONNAIRE - PHQ9
SUM OF ALL RESPONSES TO PHQ QUESTIONS 1-9: 0
SUM OF ALL RESPONSES TO PHQ QUESTIONS 1-9: 0
1. LITTLE INTEREST OR PLEASURE IN DOING THINGS: 0
2. FEELING DOWN, DEPRESSED OR HOPELESS: 0
SUM OF ALL RESPONSES TO PHQ9 QUESTIONS 1 & 2: 0

## 2019-01-07 ENCOUNTER — TELEPHONE (OUTPATIENT)
Dept: FAMILY MEDICINE CLINIC | Age: 64
End: 2019-01-07

## 2019-02-06 ENCOUNTER — HOSPITAL ENCOUNTER (OUTPATIENT)
Age: 64
Discharge: HOME | End: 2019-02-06
Payer: COMMERCIAL

## 2019-02-06 ENCOUNTER — OFFICE VISIT (OUTPATIENT)
Dept: PULMONOLOGY | Age: 64
End: 2019-02-06
Payer: COMMERCIAL

## 2019-02-06 VITALS
DIASTOLIC BLOOD PRESSURE: 70 MMHG | SYSTOLIC BLOOD PRESSURE: 120 MMHG | OXYGEN SATURATION: 95 % | TEMPERATURE: 98.3 F | HEIGHT: 64 IN | WEIGHT: 211.4 LBS | BODY MASS INDEX: 36.09 KG/M2 | HEART RATE: 80 BPM | RESPIRATION RATE: 20 BRPM

## 2019-02-06 DIAGNOSIS — R05.9 COUGH: ICD-10-CM

## 2019-02-06 DIAGNOSIS — R06.02 SOB (SHORTNESS OF BREATH): Primary | ICD-10-CM

## 2019-02-06 DIAGNOSIS — R06.02 SOB (SHORTNESS OF BREATH): ICD-10-CM

## 2019-02-06 DIAGNOSIS — E03.9 ACQUIRED HYPOTHYROIDISM: ICD-10-CM

## 2019-02-06 LAB
BASOPHILS ABSOLUTE: 0.1 K/UL (ref 0–0.2)
BASOPHILS RELATIVE PERCENT: 0.5 %
EOSINOPHILS ABSOLUTE: 0.9 K/UL (ref 0–0.6)
EOSINOPHILS RELATIVE PERCENT: 8.5 %
HCT VFR BLD CALC: 36.8 % (ref 36–48)
HEMOGLOBIN: 12.2 G/DL (ref 12–16)
LYMPHOCYTES ABSOLUTE: 2.9 K/UL (ref 1–5.1)
LYMPHOCYTES RELATIVE PERCENT: 28.6 %
MCH RBC QN AUTO: 27.6 PG (ref 26–34)
MCHC RBC AUTO-ENTMCNC: 33.1 G/DL (ref 31–36)
MCV RBC AUTO: 83.3 FL (ref 80–100)
MONOCYTES ABSOLUTE: 0.6 K/UL (ref 0–1.3)
MONOCYTES RELATIVE PERCENT: 5.5 %
NEUTROPHILS ABSOLUTE: 5.7 K/UL (ref 1.7–7.7)
NEUTROPHILS RELATIVE PERCENT: 56.9 %
PDW BLD-RTO: 16.1 % (ref 12.4–15.4)
PLATELET # BLD: 193 K/UL (ref 135–450)
PMV BLD AUTO: 8.1 FL (ref 5–10.5)
RBC # BLD: 4.42 M/UL (ref 4–5.2)
TSH SERPL DL<=0.05 MIU/L-ACNC: 0.82 UIU/ML (ref 0.27–4.2)
WBC # BLD: 10 K/UL (ref 4–11)

## 2019-02-06 PROCEDURE — 82785 ASSAY OF IGE: CPT

## 2019-02-06 PROCEDURE — 99205 OFFICE O/P NEW HI 60 MIN: CPT | Performed by: INTERNAL MEDICINE

## 2019-02-06 PROCEDURE — G8417 CALC BMI ABV UP PARAM F/U: HCPCS | Performed by: INTERNAL MEDICINE

## 2019-02-06 PROCEDURE — G8400 PT W/DXA NO RESULTS DOC: HCPCS | Performed by: INTERNAL MEDICINE

## 2019-02-06 PROCEDURE — 1123F ACP DISCUSS/DSCN MKR DOCD: CPT | Performed by: INTERNAL MEDICINE

## 2019-02-06 PROCEDURE — 1036F TOBACCO NON-USER: CPT | Performed by: INTERNAL MEDICINE

## 2019-02-06 PROCEDURE — 4040F PNEUMOC VAC/ADMIN/RCVD: CPT | Performed by: INTERNAL MEDICINE

## 2019-02-06 PROCEDURE — G8428 CUR MEDS NOT DOCUMENT: HCPCS | Performed by: INTERNAL MEDICINE

## 2019-02-06 PROCEDURE — 1101F PT FALLS ASSESS-DOCD LE1/YR: CPT | Performed by: INTERNAL MEDICINE

## 2019-02-06 PROCEDURE — 84443 ASSAY THYROID STIM HORMONE: CPT

## 2019-02-06 PROCEDURE — 3017F COLORECTAL CA SCREEN DOC REV: CPT | Performed by: INTERNAL MEDICINE

## 2019-02-06 PROCEDURE — 1090F PRES/ABSN URINE INCON ASSESS: CPT | Performed by: INTERNAL MEDICINE

## 2019-02-06 PROCEDURE — 85025 COMPLETE CBC W/AUTO DIFF WBC: CPT

## 2019-02-06 PROCEDURE — 36415 COLL VENOUS BLD VENIPUNCTURE: CPT

## 2019-02-06 PROCEDURE — G8484 FLU IMMUNIZE NO ADMIN: HCPCS | Performed by: INTERNAL MEDICINE

## 2019-02-06 PROCEDURE — 86003 ALLG SPEC IGE CRUDE XTRC EA: CPT

## 2019-02-06 RX ORDER — ALBUTEROL SULFATE 90 UG/1
2 AEROSOL, METERED RESPIRATORY (INHALATION) EVERY 4 HOURS PRN
COMMUNITY
End: 2019-03-20 | Stop reason: SDUPTHER

## 2019-02-06 RX ORDER — AZELASTINE HYDROCHLORIDE, FLUTICASONE PROPIONATE 137; 50 UG/1; UG/1
1 SPRAY, METERED NASAL DAILY
COMMUNITY
End: 2019-05-02 | Stop reason: SDUPTHER

## 2019-02-08 LAB
2000687N OAK TREE IGE: <0.1 KU/L
ALLERGEN ASPERGILLUS ALTERNATA IGE: <0.1 KU/L
ALLERGEN ASPERGILLUS FUMIGATUS IGE: <0.1 KU/L
ALLERGEN BERMUDA GRASS IGE: <0.1 KU/L
ALLERGEN BIRCH IGE: <0.1 KU/L
ALLERGEN CAT DANDER IGE: <0.1 KU/L
ALLERGEN COMMON SHORT RAGWEED IGE: <0.1 KU/L
ALLERGEN COTTONWOOD: <0.1 KU/L
ALLERGEN COW MILK IGE: 0.33 KU/L
ALLERGEN DOG DANDER IGE: <0.1 KU/L
ALLERGEN ELM IGE: <0.1 KU/L
ALLERGEN FUNGI/MOLD M.RACEMOSUS IGE: <0.1 KU/L
ALLERGEN GERMAN COCKROACH IGE: <0.1 KU/L
ALLERGEN HORMODENDRUM HORDEI IGE: <0.1 KU/L
ALLERGEN MAPLE/BOX ELDER IGE: <0.1 KU/L
ALLERGEN MITE DUST FARINAE IGE: <0.1 KU/L
ALLERGEN MITE DUST PTERONYSSINUS IGE: <0.1 KU/L
ALLERGEN MOUNTAIN CEDAR: <0.1 KU/L
ALLERGEN MOUSE EPITHELIA IGE: 0.15 KU/L
ALLERGEN PEANUT (F13) IGE: <0.1 KU/L
ALLERGEN PECAN TREE IGE: <0.1 KU/L
ALLERGEN PENICILLIUM NOTATUM: <0.1 KU/L
ALLERGEN ROUGH PIGWEED (W14) IGE: <0.1 KU/L
ALLERGEN RUSSIAN THISTLE IGE: <0.1 KU/L
ALLERGEN SEE NOTE: NORMAL
ALLERGEN SHEEP SORREL (W18) IGE: <0.1 KU/L
ALLERGEN TIMOTHY GRASS: <0.1 KU/L
ALLERGEN TREE SYCAMORE: <0.1 KU/L
ALLERGEN WALNUT TREE IGE: <0.1 KU/L
ALLERGEN WHITE MULBERRY TREE, IGE: <0.1 KU/L
ALLERGEN, TREE, WHITE ASH IGE: <0.1 KU/L
IGE: 90 KU/L

## 2019-02-20 ENCOUNTER — TELEPHONE (OUTPATIENT)
Dept: FAMILY MEDICINE CLINIC | Age: 64
End: 2019-02-20

## 2019-03-01 ENCOUNTER — HOSPITAL ENCOUNTER (OUTPATIENT)
Dept: PULMONOLOGY | Age: 64
Discharge: HOME | End: 2019-03-01
Payer: COMMERCIAL

## 2019-03-01 ENCOUNTER — OFFICE VISIT (OUTPATIENT)
Dept: PULMONOLOGY | Age: 64
End: 2019-03-01
Payer: COMMERCIAL

## 2019-03-01 VITALS
HEIGHT: 64 IN | DIASTOLIC BLOOD PRESSURE: 70 MMHG | RESPIRATION RATE: 20 BRPM | BODY MASS INDEX: 36.84 KG/M2 | SYSTOLIC BLOOD PRESSURE: 138 MMHG | OXYGEN SATURATION: 98 % | TEMPERATURE: 98 F | WEIGHT: 215.8 LBS | HEART RATE: 68 BPM

## 2019-03-01 VITALS — OXYGEN SATURATION: 94 %

## 2019-03-01 DIAGNOSIS — R05.9 COUGH: ICD-10-CM

## 2019-03-01 DIAGNOSIS — R94.2 ABNORMAL PFT: Primary | ICD-10-CM

## 2019-03-01 DIAGNOSIS — R06.02 SOB (SHORTNESS OF BREATH): ICD-10-CM

## 2019-03-01 PROBLEM — J44.1 CHRONIC OBSTRUCTIVE PULMONARY DISEASE WITH ACUTE EXACERBATION (HCC): Status: RESOLVED | Noted: 2018-10-10 | Resolved: 2019-03-01

## 2019-03-01 PROBLEM — J44.9 CHRONIC OBSTRUCTIVE PULMONARY DISEASE (HCC): Status: RESOLVED | Noted: 2018-10-10 | Resolved: 2019-03-01

## 2019-03-01 LAB
DLCO %PRED: 43 %
DLCO PRED: NORMAL ML/MIN/MMHG
DLCO/VA %PRED: NORMAL %
DLCO/VA PRED: NORMAL ML/MIN/MMHG
DLCO/VA: NORMAL ML/MIN/MMHG
DLCO: NORMAL ML/MIN/MMHG
EXPIRATORY TIME-POST: NORMAL SEC
EXPIRATORY TIME: NORMAL SEC
FEF 25-75% %CHNG: NORMAL
FEF 25-75% %PRED-POST: NORMAL %
FEF 25-75% %PRED-PRE: NORMAL L/SEC
FEF 25-75% PRED: NORMAL L/SEC
FEF 25-75%-POST: NORMAL L/SEC
FEF 25-75%-PRE: NORMAL L/SEC
FEV1 %PRED-POST: 63 %
FEV1 %PRED-PRE: 64 %
FEV1 PRED: NORMAL L
FEV1-POST: NORMAL L
FEV1-PRE: NORMAL L
FEV1/FVC %PRED-POST: NORMAL %
FEV1/FVC %PRED-PRE: NORMAL %
FEV1/FVC PRED: NORMAL %
FEV1/FVC-POST: 74 %
FEV1/FVC-PRE: 79 %
FVC %PRED-POST: NORMAL L
FVC %PRED-PRE: NORMAL %
FVC PRED: NORMAL L
FVC-POST: NORMAL L
FVC-PRE: NORMAL L
GAW %PRED: NORMAL %
GAW PRED: NORMAL L/S/CMH2O
GAW: NORMAL L/S/CMH2O
IC %PRED: NORMAL %
IC PRED: NORMAL L
IC: NORMAL L
MEP: NORMAL
MIP: NORMAL
MVV %PRED-PRE: NORMAL %
MVV PRED: NORMAL L/MIN
MVV-PRE: NORMAL L/MIN
PEF %PRED-POST: NORMAL %
PEF %PRED-PRE: NORMAL L/SEC
PEF PRED: NORMAL L/SEC
PEF%CHNG: NORMAL
PEF-POST: NORMAL L/SEC
PEF-PRE: NORMAL L/SEC
RAW %PRED: NORMAL %
RAW PRED: NORMAL CMH2O/L/S
RAW: NORMAL CMH2O/L/S
RV %PRED: NORMAL %
RV PRED: NORMAL L
RV: NORMAL L
SVC %PRED: NORMAL %
SVC PRED: NORMAL L
SVC: NORMAL L
TLC %PRED: 66 %
TLC PRED: NORMAL L
TLC: NORMAL L
VA %PRED: NORMAL %
VA PRED: NORMAL L
VA: NORMAL L
VTG %PRED: NORMAL %
VTG PRED: NORMAL L
VTG: NORMAL L

## 2019-03-01 PROCEDURE — G8400 PT W/DXA NO RESULTS DOC: HCPCS | Performed by: INTERNAL MEDICINE

## 2019-03-01 PROCEDURE — 1036F TOBACCO NON-USER: CPT | Performed by: INTERNAL MEDICINE

## 2019-03-01 PROCEDURE — 1123F ACP DISCUSS/DSCN MKR DOCD: CPT | Performed by: INTERNAL MEDICINE

## 2019-03-01 PROCEDURE — 6360000002 HC RX W HCPCS: Performed by: INTERNAL MEDICINE

## 2019-03-01 PROCEDURE — 94070 EVALUATION OF WHEEZING: CPT

## 2019-03-01 PROCEDURE — 1101F PT FALLS ASSESS-DOCD LE1/YR: CPT | Performed by: INTERNAL MEDICINE

## 2019-03-01 PROCEDURE — 94640 AIRWAY INHALATION TREATMENT: CPT

## 2019-03-01 PROCEDURE — 94729 DIFFUSING CAPACITY: CPT

## 2019-03-01 PROCEDURE — 94060 EVALUATION OF WHEEZING: CPT

## 2019-03-01 PROCEDURE — 94760 N-INVAS EAR/PLS OXIMETRY 1: CPT

## 2019-03-01 PROCEDURE — 99213 OFFICE O/P EST LOW 20 MIN: CPT | Performed by: INTERNAL MEDICINE

## 2019-03-01 PROCEDURE — 1090F PRES/ABSN URINE INCON ASSESS: CPT | Performed by: INTERNAL MEDICINE

## 2019-03-01 PROCEDURE — G8427 DOCREV CUR MEDS BY ELIG CLIN: HCPCS | Performed by: INTERNAL MEDICINE

## 2019-03-01 PROCEDURE — 4040F PNEUMOC VAC/ADMIN/RCVD: CPT | Performed by: INTERNAL MEDICINE

## 2019-03-01 PROCEDURE — G8484 FLU IMMUNIZE NO ADMIN: HCPCS | Performed by: INTERNAL MEDICINE

## 2019-03-01 PROCEDURE — G8417 CALC BMI ABV UP PARAM F/U: HCPCS | Performed by: INTERNAL MEDICINE

## 2019-03-01 PROCEDURE — 3017F COLORECTAL CA SCREEN DOC REV: CPT | Performed by: INTERNAL MEDICINE

## 2019-03-01 PROCEDURE — 94726 PLETHYSMOGRAPHY LUNG VOLUMES: CPT

## 2019-03-01 RX ORDER — ALBUTEROL SULFATE 2.5 MG/3ML
2.5 SOLUTION RESPIRATORY (INHALATION) ONCE
Status: COMPLETED | OUTPATIENT
Start: 2019-03-01 | End: 2019-03-01

## 2019-03-01 RX ADMIN — METHACHOLINE CHLORIDE 100 MG: 100 POWDER, FOR SOLUTION RESPIRATORY (INHALATION) at 09:27

## 2019-03-01 RX ADMIN — ALBUTEROL SULFATE 2.5 MG: 2.5 SOLUTION RESPIRATORY (INHALATION) at 09:27

## 2019-03-01 ASSESSMENT — PULMONARY FUNCTION TESTS
FEV1/FVC_POST: 74
FEV1_PERCENT_PREDICTED_PRE: 64
FEV1/FVC_PRE: 79
FEV1_PERCENT_PREDICTED_POST: 63

## 2019-03-08 ENCOUNTER — HOSPITAL ENCOUNTER (OUTPATIENT)
Dept: CT IMAGING | Age: 64
Discharge: HOME | End: 2019-03-08
Payer: COMMERCIAL

## 2019-03-08 DIAGNOSIS — R94.2 ABNORMAL PFT: ICD-10-CM

## 2019-03-08 PROCEDURE — 71250 CT THORAX DX C-: CPT

## 2019-03-11 ENCOUNTER — TELEPHONE (OUTPATIENT)
Dept: PULMONOLOGY | Age: 64
End: 2019-03-11

## 2019-03-14 ENCOUNTER — TELEPHONE (OUTPATIENT)
Dept: PULMONOLOGY | Age: 64
End: 2019-03-14

## 2019-03-14 DIAGNOSIS — J84.10 PULMONARY FIBROSIS (HCC): Primary | ICD-10-CM

## 2019-03-15 ENCOUNTER — HOSPITAL ENCOUNTER (OUTPATIENT)
Dept: CT IMAGING | Age: 64
Discharge: HOME | End: 2019-03-15
Payer: COMMERCIAL

## 2019-03-15 DIAGNOSIS — J84.10 PULMONARY FIBROSIS (HCC): ICD-10-CM

## 2019-03-15 PROCEDURE — 71250 CT THORAX DX C-: CPT

## 2019-03-20 ENCOUNTER — OFFICE VISIT (OUTPATIENT)
Dept: PULMONOLOGY | Age: 64
End: 2019-03-20
Payer: COMMERCIAL

## 2019-03-20 VITALS
RESPIRATION RATE: 16 BRPM | WEIGHT: 215 LBS | HEART RATE: 80 BPM | SYSTOLIC BLOOD PRESSURE: 126 MMHG | HEIGHT: 64 IN | TEMPERATURE: 98.5 F | OXYGEN SATURATION: 95 % | DIASTOLIC BLOOD PRESSURE: 80 MMHG | BODY MASS INDEX: 36.7 KG/M2

## 2019-03-20 DIAGNOSIS — R94.2 ABNORMAL PFT: Primary | ICD-10-CM

## 2019-03-20 DIAGNOSIS — R05.9 COUGH: ICD-10-CM

## 2019-03-20 DIAGNOSIS — R09.81 SINUS CONGESTION: ICD-10-CM

## 2019-03-20 DIAGNOSIS — R06.02 SOB (SHORTNESS OF BREATH): ICD-10-CM

## 2019-03-20 PROCEDURE — G8417 CALC BMI ABV UP PARAM F/U: HCPCS | Performed by: INTERNAL MEDICINE

## 2019-03-20 PROCEDURE — 1036F TOBACCO NON-USER: CPT | Performed by: INTERNAL MEDICINE

## 2019-03-20 PROCEDURE — G8400 PT W/DXA NO RESULTS DOC: HCPCS | Performed by: INTERNAL MEDICINE

## 2019-03-20 PROCEDURE — 1090F PRES/ABSN URINE INCON ASSESS: CPT | Performed by: INTERNAL MEDICINE

## 2019-03-20 PROCEDURE — 4040F PNEUMOC VAC/ADMIN/RCVD: CPT | Performed by: INTERNAL MEDICINE

## 2019-03-20 PROCEDURE — G8484 FLU IMMUNIZE NO ADMIN: HCPCS | Performed by: INTERNAL MEDICINE

## 2019-03-20 PROCEDURE — 3017F COLORECTAL CA SCREEN DOC REV: CPT | Performed by: INTERNAL MEDICINE

## 2019-03-20 PROCEDURE — 1101F PT FALLS ASSESS-DOCD LE1/YR: CPT | Performed by: INTERNAL MEDICINE

## 2019-03-20 PROCEDURE — G8427 DOCREV CUR MEDS BY ELIG CLIN: HCPCS | Performed by: INTERNAL MEDICINE

## 2019-03-20 PROCEDURE — 99213 OFFICE O/P EST LOW 20 MIN: CPT | Performed by: INTERNAL MEDICINE

## 2019-03-20 PROCEDURE — 1123F ACP DISCUSS/DSCN MKR DOCD: CPT | Performed by: INTERNAL MEDICINE

## 2019-03-20 RX ORDER — ALBUTEROL SULFATE 90 UG/1
2 AEROSOL, METERED RESPIRATORY (INHALATION) EVERY 4 HOURS PRN
Qty: 1 INHALER | Refills: 5 | Status: SHIPPED | OUTPATIENT
Start: 2019-03-20 | End: 2019-05-02 | Stop reason: SDUPTHER

## 2019-03-20 RX ORDER — BENZONATATE 200 MG/1
200 CAPSULE ORAL 3 TIMES DAILY PRN
Qty: 30 CAPSULE | Refills: 5 | Status: SHIPPED | OUTPATIENT
Start: 2019-03-20 | End: 2019-03-27

## 2019-05-02 ENCOUNTER — OFFICE VISIT (OUTPATIENT)
Dept: FAMILY MEDICINE CLINIC | Age: 64
End: 2019-05-02
Payer: COMMERCIAL

## 2019-05-02 VITALS
TEMPERATURE: 94.6 F | DIASTOLIC BLOOD PRESSURE: 72 MMHG | OXYGEN SATURATION: 94 % | SYSTOLIC BLOOD PRESSURE: 124 MMHG | WEIGHT: 212.4 LBS | BODY MASS INDEX: 37.03 KG/M2 | HEART RATE: 76 BPM

## 2019-05-02 DIAGNOSIS — L50.9 URTICARIA: ICD-10-CM

## 2019-05-02 DIAGNOSIS — E03.9 ACQUIRED HYPOTHYROIDISM: ICD-10-CM

## 2019-05-02 DIAGNOSIS — F32.0 MILD SINGLE CURRENT EPISODE OF MAJOR DEPRESSIVE DISORDER (HCC): ICD-10-CM

## 2019-05-02 DIAGNOSIS — G43.009 MIGRAINE WITHOUT AURA AND WITHOUT STATUS MIGRAINOSUS, NOT INTRACTABLE: ICD-10-CM

## 2019-05-02 DIAGNOSIS — I10 ESSENTIAL HYPERTENSION: ICD-10-CM

## 2019-05-02 DIAGNOSIS — F51.04 PSYCHOPHYSIOLOGICAL INSOMNIA: ICD-10-CM

## 2019-05-02 DIAGNOSIS — F41.9 ANXIETY: ICD-10-CM

## 2019-05-02 PROCEDURE — G8417 CALC BMI ABV UP PARAM F/U: HCPCS | Performed by: FAMILY MEDICINE

## 2019-05-02 PROCEDURE — 3017F COLORECTAL CA SCREEN DOC REV: CPT | Performed by: FAMILY MEDICINE

## 2019-05-02 PROCEDURE — 99214 OFFICE O/P EST MOD 30 MIN: CPT | Performed by: FAMILY MEDICINE

## 2019-05-02 PROCEDURE — G8427 DOCREV CUR MEDS BY ELIG CLIN: HCPCS | Performed by: FAMILY MEDICINE

## 2019-05-02 PROCEDURE — G8400 PT W/DXA NO RESULTS DOC: HCPCS | Performed by: FAMILY MEDICINE

## 2019-05-02 PROCEDURE — 4040F PNEUMOC VAC/ADMIN/RCVD: CPT | Performed by: FAMILY MEDICINE

## 2019-05-02 PROCEDURE — 1090F PRES/ABSN URINE INCON ASSESS: CPT | Performed by: FAMILY MEDICINE

## 2019-05-02 PROCEDURE — 1123F ACP DISCUSS/DSCN MKR DOCD: CPT | Performed by: FAMILY MEDICINE

## 2019-05-02 PROCEDURE — 1036F TOBACCO NON-USER: CPT | Performed by: FAMILY MEDICINE

## 2019-05-02 RX ORDER — MIRTAZAPINE 30 MG/1
TABLET, FILM COATED ORAL
Qty: 30 TABLET | Refills: 0 | Status: SHIPPED | OUTPATIENT
Start: 2019-05-02 | End: 2019-06-14 | Stop reason: SDUPTHER

## 2019-05-02 RX ORDER — LORAZEPAM 0.5 MG/1
0.5 TABLET ORAL EVERY 6 HOURS PRN
Qty: 30 TABLET | Refills: 5 | Status: SHIPPED | OUTPATIENT
Start: 2019-05-02 | End: 2019-12-12 | Stop reason: SDUPTHER

## 2019-05-02 RX ORDER — HYDROXYZINE HYDROCHLORIDE 10 MG/1
10-30 TABLET, FILM COATED ORAL 3 TIMES DAILY PRN
Qty: 90 TABLET | Refills: 0 | Status: SHIPPED | OUTPATIENT
Start: 2019-05-02 | End: 2020-02-18

## 2019-05-02 RX ORDER — SERTRALINE HYDROCHLORIDE 100 MG/1
200 TABLET, FILM COATED ORAL DAILY
Qty: 60 TABLET | Refills: 5 | Status: SHIPPED | OUTPATIENT
Start: 2019-05-02 | End: 2019-09-09 | Stop reason: SDUPTHER

## 2019-05-02 RX ORDER — AZELASTINE HYDROCHLORIDE, FLUTICASONE PROPIONATE 137; 50 UG/1; UG/1
1 SPRAY, METERED NASAL DAILY
Qty: 1 BOTTLE | Refills: 5 | Status: SHIPPED | OUTPATIENT
Start: 2019-05-02 | End: 2019-06-16 | Stop reason: ALTCHOICE

## 2019-05-02 RX ORDER — LEVOTHYROXINE SODIUM 0.15 MG/1
TABLET ORAL
Qty: 30 TABLET | Refills: 3 | Status: SHIPPED | OUTPATIENT
Start: 2019-05-02 | End: 2019-09-09 | Stop reason: SDUPTHER

## 2019-05-02 RX ORDER — AMITRIPTYLINE HYDROCHLORIDE 25 MG/1
25 TABLET, FILM COATED ORAL NIGHTLY
Qty: 30 TABLET | Refills: 5 | Status: SHIPPED | OUTPATIENT
Start: 2019-05-02 | End: 2019-09-09 | Stop reason: SDUPTHER

## 2019-05-02 RX ORDER — KETOROLAC TROMETHAMINE 10 MG/1
10 TABLET, FILM COATED ORAL EVERY 6 HOURS PRN
Qty: 30 TABLET | Refills: 5 | Status: ON HOLD | OUTPATIENT
Start: 2019-05-02 | End: 2019-07-14 | Stop reason: HOSPADM

## 2019-05-02 RX ORDER — NADOLOL 20 MG/1
TABLET ORAL
Qty: 60 TABLET | Refills: 5 | Status: ON HOLD | OUTPATIENT
Start: 2019-05-02 | End: 2019-08-10 | Stop reason: HOSPADM

## 2019-05-02 RX ORDER — TRAZODONE HYDROCHLORIDE 50 MG/1
TABLET ORAL
Qty: 30 TABLET | Refills: 5 | Status: SHIPPED | OUTPATIENT
Start: 2019-05-02 | End: 2019-09-09 | Stop reason: SDUPTHER

## 2019-05-02 RX ORDER — ALBUTEROL SULFATE 90 UG/1
2 AEROSOL, METERED RESPIRATORY (INHALATION) EVERY 4 HOURS PRN
Qty: 1 INHALER | Refills: 5 | Status: SHIPPED | OUTPATIENT
Start: 2019-05-02 | End: 2019-05-30

## 2019-05-02 ASSESSMENT — PATIENT HEALTH QUESTIONNAIRE - PHQ9
SUM OF ALL RESPONSES TO PHQ QUESTIONS 1-9: 0
2. FEELING DOWN, DEPRESSED OR HOPELESS: 0
SUM OF ALL RESPONSES TO PHQ9 QUESTIONS 1 & 2: 0
1. LITTLE INTEREST OR PLEASURE IN DOING THINGS: 0
SUM OF ALL RESPONSES TO PHQ QUESTIONS 1-9: 0

## 2019-05-02 NOTE — PATIENT INSTRUCTIONS
alert. Never play a relaxation tape while you drive a car. When should you call for help? Call 911 anytime you think you may need emergency care. For example, call if:    · You feel you cannot stop from hurting yourself or someone else.   Naresh Cortez the numbers for these national suicide hotlines: 0-906-431-TALK (2-936.335.7120) and 8-105-CZBZXFW (5-455.944.8272). If you or someone you know talks about suicide or feeling hopeless, get help right away.   Watch closely for changes in your health, and be sure to contact your doctor if:    · You have anxiety or fear that affects your life.     · You have symptoms of anxiety that are new or different from those you had before. Where can you learn more? Go to [email or web address] and sign in to your [employer] account. Enter P754 in the SplitSecnd box to learn more about \"Anxiety Disorder: Care Instructions. \"     If you do not have an account, please click on the \"Sign Up Now\" link. Current as of: September 11, 2018  Content Version: 11.9  © 4454-0779 [employer]. Care instructions adapted under license by [facility] ([facility]). If you have questions about a medical condition or this instruction, always ask your healthcare professional. [address] any warranty or liability for your use of this information.

## 2019-05-02 NOTE — PROGRESS NOTES
EOMI, Conjunctiva clear  NECK: without thyromegaly, lymphadenopathy, JVD  LUNGS:slight diffuse expiratory wheeszing  Breathing comfortably,   CARDIOVASCULAR:  Regular rate and rhythm, no murmurs, rubs, or gallops  EXTREMITY: Full range of motion. No clubbing/cyanosis/edema  NEURO: Cranial nerves II-XII grossly intact. Strength 5/5, DTR 2/4. SKIN: Warm, Dry, No rash. PSYCH: Mood and Affect normal.  TSH: 0.20 and free T4 1.31 on 12/13/18    ASSESSMENT:     Diagnosis Orders   1. Urticaria  hydrOXYzine (ATARAX) 10 MG tablet   2. Acquired hypothyroidism  levothyroxine (SYNTHROID) 150 MCG tablet   3. Essential hypertension  nadolol (CORGARD) 20 MG tablet   4. Migraine without aura and without status migrainosus, not intractable  amitriptyline (ELAVIL) 25 MG tablet    ketorolac (TORADOL) 10 MG tablet   5. Psychophysiological insomnia  traZODone (DESYREL) 50 MG tablet   6. Mild single current episode of major depressive disorder (HCC)  sertraline (ZOLOFT) 100 MG tablet   7. Anxiety  sertraline (ZOLOFT) 100 MG tablet    mirtazapine (REMERON) 30 MG tablet    LORazepam (ATIVAN) 0.5 MG tablet         PLAN: see orders    Valencia Cordova received counseling on the following healthy behaviors: nutrition, exercise and medication adherence    Patient given educational materials on Hypertension    I have instructed Armin to complete a self tracking handout on Blood Pressures  and instructed them to bring it with them to her next appointment. Discussed use, benefit, and side effects of prescribed medications. Barriers to medication compliance addressed. All patient questions answered. Pt voiced understanding.

## 2019-05-30 ENCOUNTER — APPOINTMENT (OUTPATIENT)
Dept: CT IMAGING | Age: 64
End: 2019-05-30
Payer: COMMERCIAL

## 2019-05-30 ENCOUNTER — APPOINTMENT (OUTPATIENT)
Dept: GENERAL RADIOLOGY | Age: 64
End: 2019-05-30
Payer: COMMERCIAL

## 2019-05-30 ENCOUNTER — HOSPITAL ENCOUNTER (EMERGENCY)
Age: 64
Discharge: HOME | End: 2019-05-30
Attending: EMERGENCY MEDICINE
Payer: COMMERCIAL

## 2019-05-30 VITALS
HEART RATE: 76 BPM | BODY MASS INDEX: 33.66 KG/M2 | SYSTOLIC BLOOD PRESSURE: 112 MMHG | RESPIRATION RATE: 16 BRPM | WEIGHT: 202 LBS | DIASTOLIC BLOOD PRESSURE: 76 MMHG | HEIGHT: 65 IN | OXYGEN SATURATION: 90 % | TEMPERATURE: 99.6 F

## 2019-05-30 DIAGNOSIS — J40 BRONCHITIS: Primary | ICD-10-CM

## 2019-05-30 LAB
A/G RATIO: 0.8 (ref 1.1–2.2)
ALBUMIN SERPL-MCNC: 3.5 G/DL (ref 3.4–5)
ALP BLD-CCNC: 115 U/L (ref 40–129)
ALT SERPL-CCNC: 16 U/L (ref 10–40)
ANION GAP SERPL CALCULATED.3IONS-SCNC: 11 MMOL/L (ref 3–16)
AST SERPL-CCNC: 39 U/L (ref 15–37)
BASE EXCESS VENOUS: -1.1 MMOL/L (ref -3–3)
BASOPHILS ABSOLUTE: 0.1 K/UL (ref 0–0.2)
BASOPHILS RELATIVE PERCENT: 0.9 %
BILIRUB SERPL-MCNC: 0.6 MG/DL (ref 0–1)
BILIRUBIN URINE: NEGATIVE
BLOOD, URINE: NEGATIVE
BUN BLDV-MCNC: 14 MG/DL (ref 7–20)
CALCIUM SERPL-MCNC: 8.8 MG/DL (ref 8.3–10.6)
CARBOXYHEMOGLOBIN: 2.7 % (ref 0–1.5)
CHLORIDE BLD-SCNC: 105 MMOL/L (ref 99–110)
CLARITY: CLEAR
CO2: 22 MMOL/L (ref 21–32)
COLOR: YELLOW
CREAT SERPL-MCNC: 0.7 MG/DL (ref 0.6–1.2)
D DIMER: 398 NG/ML DDU (ref 0–229)
EOSINOPHILS ABSOLUTE: 0.5 K/UL (ref 0–0.6)
EOSINOPHILS RELATIVE PERCENT: 4.5 %
GFR AFRICAN AMERICAN: >60
GFR NON-AFRICAN AMERICAN: >60
GLOBULIN: 4.5 G/DL
GLUCOSE BLD-MCNC: 142 MG/DL (ref 70–99)
GLUCOSE URINE: NEGATIVE MG/DL
HCO3 VENOUS: 23.1 MMOL/L (ref 23–29)
HCT VFR BLD CALC: 35.1 % (ref 36–48)
HEMOGLOBIN: 11.3 G/DL (ref 12–16)
KETONES, URINE: NEGATIVE MG/DL
LACTIC ACID: 1 MMOL/L (ref 0.4–2)
LEUKOCYTE ESTERASE, URINE: NEGATIVE
LYMPHOCYTES ABSOLUTE: 2.3 K/UL (ref 1–5.1)
LYMPHOCYTES RELATIVE PERCENT: 20.1 %
MCH RBC QN AUTO: 27.4 PG (ref 26–34)
MCHC RBC AUTO-ENTMCNC: 32.2 G/DL (ref 31–36)
MCV RBC AUTO: 85.2 FL (ref 80–100)
METHEMOGLOBIN VENOUS: 0.3 %
MICROSCOPIC EXAMINATION: NORMAL
MONOCYTES ABSOLUTE: 0.8 K/UL (ref 0–1.3)
MONOCYTES RELATIVE PERCENT: 7.2 %
NEUTROPHILS ABSOLUTE: 7.8 K/UL (ref 1.7–7.7)
NEUTROPHILS RELATIVE PERCENT: 67.3 %
NITRITE, URINE: NEGATIVE
O2 CONTENT, VEN: 14 VOL %
O2 SAT, VEN: 83 %
O2 THERAPY: ABNORMAL
PCO2, VEN: 37 MMHG (ref 40–50)
PDW BLD-RTO: 16.1 % (ref 12.4–15.4)
PH UA: 5.5 (ref 5–8)
PH VENOUS: 7.41 (ref 7.35–7.45)
PLATELET # BLD: 170 K/UL (ref 135–450)
PMV BLD AUTO: 7.6 FL (ref 5–10.5)
PO2, VEN: 46.2 MMHG (ref 25–40)
POTASSIUM REFLEX MAGNESIUM: 4.1 MMOL/L (ref 3.5–5.1)
PRO-BNP: 385 PG/ML (ref 0–124)
PROTEIN UA: NEGATIVE MG/DL
RBC # BLD: 4.12 M/UL (ref 4–5.2)
SODIUM BLD-SCNC: 138 MMOL/L (ref 136–145)
SPECIFIC GRAVITY UA: <=1.005 (ref 1–1.03)
TCO2 CALC VENOUS: 24 MMOL/L
TOTAL PROTEIN: 8 G/DL (ref 6.4–8.2)
URINE TYPE: NORMAL
UROBILINOGEN, URINE: 0.2 E.U./DL
WBC # BLD: 11.6 K/UL (ref 4–11)

## 2019-05-30 PROCEDURE — 85379 FIBRIN DEGRADATION QUANT: CPT

## 2019-05-30 PROCEDURE — 82803 BLOOD GASES ANY COMBINATION: CPT

## 2019-05-30 PROCEDURE — 81003 URINALYSIS AUTO W/O SCOPE: CPT

## 2019-05-30 PROCEDURE — 85025 COMPLETE CBC W/AUTO DIFF WBC: CPT

## 2019-05-30 PROCEDURE — 83880 ASSAY OF NATRIURETIC PEPTIDE: CPT

## 2019-05-30 PROCEDURE — 93005 ELECTROCARDIOGRAM TRACING: CPT | Performed by: EMERGENCY MEDICINE

## 2019-05-30 PROCEDURE — 6360000004 HC RX CONTRAST MEDICATION: Performed by: EMERGENCY MEDICINE

## 2019-05-30 PROCEDURE — 83605 ASSAY OF LACTIC ACID: CPT

## 2019-05-30 PROCEDURE — 6370000000 HC RX 637 (ALT 250 FOR IP): Performed by: EMERGENCY MEDICINE

## 2019-05-30 PROCEDURE — 36415 COLL VENOUS BLD VENIPUNCTURE: CPT

## 2019-05-30 PROCEDURE — 80053 COMPREHEN METABOLIC PANEL: CPT

## 2019-05-30 PROCEDURE — 99284 EMERGENCY DEPT VISIT MOD MDM: CPT

## 2019-05-30 PROCEDURE — 71260 CT THORAX DX C+: CPT

## 2019-05-30 PROCEDURE — 71045 X-RAY EXAM CHEST 1 VIEW: CPT

## 2019-05-30 RX ORDER — ALBUTEROL SULFATE 90 UG/1
2 AEROSOL, METERED RESPIRATORY (INHALATION) 4 TIMES DAILY PRN
Qty: 1 INHALER | Refills: 0 | Status: SHIPPED | OUTPATIENT
Start: 2019-05-30 | End: 2019-12-12 | Stop reason: ALTCHOICE

## 2019-05-30 RX ORDER — ALBUTEROL SULFATE 90 UG/1
2 AEROSOL, METERED RESPIRATORY (INHALATION) 4 TIMES DAILY PRN
Qty: 1 INHALER | Refills: 0 | Status: SHIPPED | OUTPATIENT
Start: 2019-05-30 | End: 2019-06-16 | Stop reason: ALTCHOICE

## 2019-05-30 RX ORDER — IPRATROPIUM BROMIDE AND ALBUTEROL SULFATE 2.5; .5 MG/3ML; MG/3ML
1 SOLUTION RESPIRATORY (INHALATION) ONCE
Status: COMPLETED | OUTPATIENT
Start: 2019-05-30 | End: 2019-05-30

## 2019-05-30 RX ORDER — AZITHROMYCIN 250 MG/1
500 TABLET, FILM COATED ORAL ONCE
Status: COMPLETED | OUTPATIENT
Start: 2019-05-30 | End: 2019-05-30

## 2019-05-30 RX ORDER — BENZONATATE 100 MG/1
100 CAPSULE ORAL 3 TIMES DAILY PRN
Qty: 30 CAPSULE | Refills: 0 | Status: ON HOLD | OUTPATIENT
Start: 2019-05-30 | End: 2019-06-07 | Stop reason: HOSPADM

## 2019-05-30 RX ORDER — AZITHROMYCIN 250 MG/1
250 TABLET, FILM COATED ORAL SEE ADMIN INSTRUCTIONS
Qty: 6 TABLET | Refills: 0 | Status: ON HOLD | OUTPATIENT
Start: 2019-05-30 | End: 2019-06-07 | Stop reason: HOSPADM

## 2019-05-30 RX ADMIN — AZITHROMYCIN 500 MG: 250 TABLET, FILM COATED ORAL at 23:00

## 2019-05-30 RX ADMIN — IPRATROPIUM BROMIDE AND ALBUTEROL SULFATE 1 AMPULE: .5; 3 SOLUTION RESPIRATORY (INHALATION) at 20:13

## 2019-05-30 RX ADMIN — IOPAMIDOL 75 ML: 755 INJECTION, SOLUTION INTRAVENOUS at 21:29

## 2019-05-30 ASSESSMENT — PAIN - FUNCTIONAL ASSESSMENT: PAIN_FUNCTIONAL_ASSESSMENT: PREVENTS OR INTERFERES SOME ACTIVE ACTIVITIES AND ADLS

## 2019-05-30 ASSESSMENT — PAIN DESCRIPTION - FREQUENCY: FREQUENCY: INTERMITTENT

## 2019-05-30 ASSESSMENT — PAIN DESCRIPTION - ONSET: ONSET: SUDDEN

## 2019-05-30 ASSESSMENT — PAIN DESCRIPTION - PAIN TYPE: TYPE: ACUTE PAIN

## 2019-05-30 ASSESSMENT — PAIN DESCRIPTION - PROGRESSION: CLINICAL_PROGRESSION: NOT CHANGED

## 2019-05-30 ASSESSMENT — PAIN SCALES - GENERAL: PAINLEVEL_OUTOF10: 6

## 2019-05-30 ASSESSMENT — PAIN DESCRIPTION - LOCATION: LOCATION: CHEST

## 2019-05-30 ASSESSMENT — PAIN DESCRIPTION - ORIENTATION: ORIENTATION: MID

## 2019-05-31 LAB
EKG ATRIAL RATE: 91 BPM
EKG DIAGNOSIS: NORMAL
EKG P AXIS: 19 DEGREES
EKG P-R INTERVAL: 162 MS
EKG Q-T INTERVAL: 372 MS
EKG QRS DURATION: 82 MS
EKG QTC CALCULATION (BAZETT): 457 MS
EKG R AXIS: 14 DEGREES
EKG T AXIS: 24 DEGREES
EKG VENTRICULAR RATE: 91 BPM

## 2019-05-31 PROCEDURE — 93010 ELECTROCARDIOGRAM REPORT: CPT | Performed by: INTERNAL MEDICINE

## 2019-05-31 NOTE — ED NOTES
Patient and family updated on care. Patient resting in bed.       Kenyetta Fischer RN  05/30/19 3123

## 2019-05-31 NOTE — ED NOTES
Patient becomes SOB on exertion. Pulse ox 85% when back from CT and then back up to 93% when back in bed. MD made aware.       Denis Livingston RN  05/30/19 0000

## 2019-05-31 NOTE — ED NOTES
Spoke with MD about patient O2 sat staying around 90% on RA and patient having increased sob on exertion and pulse ox dropping to 85% when she moves around. States understanding. MD in to speak with patient. Patient is not on home O2.  Patient states she feels better and will f/u with pulmonologist.      Evelyn Marshall RN  05/30/19 2010

## 2019-05-31 NOTE — ED NOTES
Patient up ambulatory to BR does become SOB on exertion. Urine obtained.       Makenzie Mendez RN  05/30/19 0159

## 2019-05-31 NOTE — ED NOTES
IV removed with cannula intact, however, redness noted where tegaderm was in place. Discharge instructions reviewed. Patient strongly encouraged to f/u with PMD and to return if necessary.       Jasmin Beyer RN  05/30/19 5319

## 2019-06-04 ENCOUNTER — APPOINTMENT (OUTPATIENT)
Dept: GENERAL RADIOLOGY | Age: 64
DRG: 189 | End: 2019-06-04
Payer: COMMERCIAL

## 2019-06-04 ENCOUNTER — HOSPITAL ENCOUNTER (INPATIENT)
Age: 64
LOS: 4 days | Discharge: HOME | DRG: 189 | End: 2019-06-08
Attending: EMERGENCY MEDICINE | Admitting: INTERNAL MEDICINE
Payer: COMMERCIAL

## 2019-06-04 DIAGNOSIS — R09.02 HYPOXIA: ICD-10-CM

## 2019-06-04 DIAGNOSIS — J40 BRONCHITIS: Primary | ICD-10-CM

## 2019-06-04 DIAGNOSIS — J98.01 BRONCHOSPASM: ICD-10-CM

## 2019-06-04 PROBLEM — R06.03 ACUTE RESPIRATORY DISTRESS: Status: ACTIVE | Noted: 2019-06-04

## 2019-06-04 PROBLEM — J96.01 ACUTE RESPIRATORY FAILURE WITH HYPOXIA (HCC): Status: ACTIVE | Noted: 2019-06-04

## 2019-06-04 LAB
A/G RATIO: 0.7 (ref 1.1–2.2)
ALBUMIN SERPL-MCNC: 3.4 G/DL (ref 3.4–5)
ALP BLD-CCNC: 122 U/L (ref 40–129)
ALT SERPL-CCNC: 11 U/L (ref 10–40)
ANION GAP SERPL CALCULATED.3IONS-SCNC: 12 MMOL/L (ref 3–16)
AST SERPL-CCNC: 33 U/L (ref 15–37)
BACTERIA: ABNORMAL /HPF
BASE EXCESS VENOUS: -1.2 MMOL/L (ref -3–3)
BASOPHILS ABSOLUTE: 0.1 K/UL (ref 0–0.2)
BASOPHILS RELATIVE PERCENT: 0.7 %
BILIRUB SERPL-MCNC: 0.5 MG/DL (ref 0–1)
BILIRUBIN URINE: NEGATIVE
BLOOD, URINE: NEGATIVE
BUN BLDV-MCNC: 12 MG/DL (ref 7–20)
CALCIUM SERPL-MCNC: 8.8 MG/DL (ref 8.3–10.6)
CARBOXYHEMOGLOBIN: 3.1 % (ref 0–1.5)
CHLORIDE BLD-SCNC: 102 MMOL/L (ref 99–110)
CLARITY: CLEAR
CO2: 22 MMOL/L (ref 21–32)
COLOR: YELLOW
CREAT SERPL-MCNC: 0.7 MG/DL (ref 0.6–1.2)
EKG ATRIAL RATE: 93 BPM
EKG DIAGNOSIS: NORMAL
EKG P AXIS: 76 DEGREES
EKG P-R INTERVAL: 166 MS
EKG Q-T INTERVAL: 372 MS
EKG QRS DURATION: 72 MS
EKG QTC CALCULATION (BAZETT): 462 MS
EKG R AXIS: 71 DEGREES
EKG T AXIS: 60 DEGREES
EKG VENTRICULAR RATE: 93 BPM
EOSINOPHILS ABSOLUTE: 0.6 K/UL (ref 0–0.6)
EOSINOPHILS RELATIVE PERCENT: 3.8 %
EPITHELIAL CELLS, UA: ABNORMAL /HPF
GFR AFRICAN AMERICAN: >60
GFR NON-AFRICAN AMERICAN: >60
GLOBULIN: 5.1 G/DL
GLUCOSE BLD-MCNC: 147 MG/DL (ref 70–99)
GLUCOSE URINE: NEGATIVE MG/DL
HCO3 VENOUS: 22.7 MMOL/L (ref 23–29)
HCT VFR BLD CALC: 35 % (ref 36–48)
HEMOGLOBIN: 11.5 G/DL (ref 12–16)
KETONES, URINE: NEGATIVE MG/DL
LACTIC ACID: 1.2 MMOL/L (ref 0.4–2)
LEUKOCYTE ESTERASE, URINE: ABNORMAL
LYMPHOCYTES ABSOLUTE: 2 K/UL (ref 1–5.1)
LYMPHOCYTES RELATIVE PERCENT: 13 %
MCH RBC QN AUTO: 27.8 PG (ref 26–34)
MCHC RBC AUTO-ENTMCNC: 32.9 G/DL (ref 31–36)
MCV RBC AUTO: 84.5 FL (ref 80–100)
METHEMOGLOBIN VENOUS: 0.3 %
MICROSCOPIC EXAMINATION: YES
MONOCYTES ABSOLUTE: 0.9 K/UL (ref 0–1.3)
MONOCYTES RELATIVE PERCENT: 5.8 %
NEUTROPHILS ABSOLUTE: 12.1 K/UL (ref 1.7–7.7)
NEUTROPHILS RELATIVE PERCENT: 76.7 %
NITRITE, URINE: NEGATIVE
O2 CONTENT, VEN: 11 VOL %
O2 SAT, VEN: 64 %
O2 THERAPY: ABNORMAL
PCO2, VEN: 35.6 MMHG (ref 40–50)
PDW BLD-RTO: 16 % (ref 12.4–15.4)
PH UA: 6 (ref 5–8)
PH VENOUS: 7.42 (ref 7.35–7.45)
PLATELET # BLD: 244 K/UL (ref 135–450)
PMV BLD AUTO: 8 FL (ref 5–10.5)
PO2, VEN: 32.4 MMHG (ref 25–40)
POTASSIUM SERPL-SCNC: 4.5 MMOL/L (ref 3.5–5.1)
PROCALCITONIN: 0.17 NG/ML (ref 0–0.15)
PROTEIN UA: NEGATIVE MG/DL
RBC # BLD: 4.14 M/UL (ref 4–5.2)
RBC UA: ABNORMAL /HPF (ref 0–2)
SODIUM BLD-SCNC: 136 MMOL/L (ref 136–145)
SPECIFIC GRAVITY UA: 1.02 (ref 1–1.03)
TCO2 CALC VENOUS: 24 MMOL/L
TOTAL PROTEIN: 8.5 G/DL (ref 6.4–8.2)
TROPONIN: <0.01 NG/ML
URINE TYPE: ABNORMAL
UROBILINOGEN, URINE: 0.2 E.U./DL
WBC # BLD: 15.7 K/UL (ref 4–11)
WBC UA: ABNORMAL /HPF (ref 0–5)

## 2019-06-04 PROCEDURE — 83605 ASSAY OF LACTIC ACID: CPT

## 2019-06-04 PROCEDURE — 87486 CHLMYD PNEUM DNA AMP PROBE: CPT

## 2019-06-04 PROCEDURE — 2700000000 HC OXYGEN THERAPY PER DAY

## 2019-06-04 PROCEDURE — 87040 BLOOD CULTURE FOR BACTERIA: CPT

## 2019-06-04 PROCEDURE — 6370000000 HC RX 637 (ALT 250 FOR IP): Performed by: PHYSICIAN ASSISTANT

## 2019-06-04 PROCEDURE — 87633 RESP VIRUS 12-25 TARGETS: CPT

## 2019-06-04 PROCEDURE — 93005 ELECTROCARDIOGRAM TRACING: CPT | Performed by: EMERGENCY MEDICINE

## 2019-06-04 PROCEDURE — 80053 COMPREHEN METABOLIC PANEL: CPT

## 2019-06-04 PROCEDURE — 36415 COLL VENOUS BLD VENIPUNCTURE: CPT

## 2019-06-04 PROCEDURE — 85025 COMPLETE CBC W/AUTO DIFF WBC: CPT

## 2019-06-04 PROCEDURE — 99284 EMERGENCY DEPT VISIT MOD MDM: CPT

## 2019-06-04 PROCEDURE — 1200000000 HC SEMI PRIVATE

## 2019-06-04 PROCEDURE — 82803 BLOOD GASES ANY COMBINATION: CPT

## 2019-06-04 PROCEDURE — 94761 N-INVAS EAR/PLS OXIMETRY MLT: CPT

## 2019-06-04 PROCEDURE — 87798 DETECT AGENT NOS DNA AMP: CPT

## 2019-06-04 PROCEDURE — 84484 ASSAY OF TROPONIN QUANT: CPT

## 2019-06-04 PROCEDURE — 84145 PROCALCITONIN (PCT): CPT

## 2019-06-04 PROCEDURE — 2580000003 HC RX 258: Performed by: PHYSICIAN ASSISTANT

## 2019-06-04 PROCEDURE — 6370000000 HC RX 637 (ALT 250 FOR IP): Performed by: EMERGENCY MEDICINE

## 2019-06-04 PROCEDURE — 93010 ELECTROCARDIOGRAM REPORT: CPT | Performed by: INTERNAL MEDICINE

## 2019-06-04 PROCEDURE — 87449 NOS EACH ORGANISM AG IA: CPT

## 2019-06-04 PROCEDURE — 6370000000 HC RX 637 (ALT 250 FOR IP): Performed by: INTERNAL MEDICINE

## 2019-06-04 PROCEDURE — 96374 THER/PROPH/DIAG INJ IV PUSH: CPT

## 2019-06-04 PROCEDURE — 94640 AIRWAY INHALATION TREATMENT: CPT

## 2019-06-04 PROCEDURE — 81001 URINALYSIS AUTO W/SCOPE: CPT

## 2019-06-04 PROCEDURE — 99223 1ST HOSP IP/OBS HIGH 75: CPT | Performed by: INTERNAL MEDICINE

## 2019-06-04 PROCEDURE — 71046 X-RAY EXAM CHEST 2 VIEWS: CPT

## 2019-06-04 PROCEDURE — 6360000002 HC RX W HCPCS: Performed by: EMERGENCY MEDICINE

## 2019-06-04 PROCEDURE — 94150 VITAL CAPACITY TEST: CPT

## 2019-06-04 PROCEDURE — 87581 M.PNEUMON DNA AMP PROBE: CPT

## 2019-06-04 PROCEDURE — 87086 URINE CULTURE/COLONY COUNT: CPT

## 2019-06-04 RX ORDER — LEVOTHYROXINE SODIUM 0.15 MG/1
150 TABLET ORAL DAILY
Status: DISCONTINUED | OUTPATIENT
Start: 2019-06-04 | End: 2019-06-08 | Stop reason: HOSPADM

## 2019-06-04 RX ORDER — METHYLPREDNISOLONE SODIUM SUCCINATE 40 MG/ML
40 INJECTION, POWDER, LYOPHILIZED, FOR SOLUTION INTRAMUSCULAR; INTRAVENOUS EVERY 12 HOURS
Status: COMPLETED | OUTPATIENT
Start: 2019-06-05 | End: 2019-06-06

## 2019-06-04 RX ORDER — IPRATROPIUM BROMIDE AND ALBUTEROL SULFATE 2.5; .5 MG/3ML; MG/3ML
1 SOLUTION RESPIRATORY (INHALATION) ONCE
Status: COMPLETED | OUTPATIENT
Start: 2019-06-04 | End: 2019-06-04

## 2019-06-04 RX ORDER — LEVOFLOXACIN 5 MG/ML
500 INJECTION, SOLUTION INTRAVENOUS ONCE
Status: COMPLETED | OUTPATIENT
Start: 2019-06-04 | End: 2019-06-04

## 2019-06-04 RX ORDER — ASPIRIN 81 MG/1
81 TABLET, CHEWABLE ORAL DAILY
Status: DISCONTINUED | OUTPATIENT
Start: 2019-06-04 | End: 2019-06-08 | Stop reason: HOSPADM

## 2019-06-04 RX ORDER — ONDANSETRON 2 MG/ML
4 INJECTION INTRAMUSCULAR; INTRAVENOUS EVERY 6 HOURS PRN
Status: DISCONTINUED | OUTPATIENT
Start: 2019-06-04 | End: 2019-06-08 | Stop reason: HOSPADM

## 2019-06-04 RX ORDER — ALBUTEROL SULFATE 2.5 MG/3ML
2.5 SOLUTION RESPIRATORY (INHALATION) ONCE
Status: COMPLETED | OUTPATIENT
Start: 2019-06-04 | End: 2019-06-04

## 2019-06-04 RX ORDER — SODIUM CHLORIDE 9 MG/ML
INJECTION, SOLUTION INTRAVENOUS CONTINUOUS
Status: DISCONTINUED | OUTPATIENT
Start: 2019-06-04 | End: 2019-06-06

## 2019-06-04 RX ORDER — IPRATROPIUM BROMIDE AND ALBUTEROL SULFATE 2.5; .5 MG/3ML; MG/3ML
1 SOLUTION RESPIRATORY (INHALATION)
Status: DISCONTINUED | OUTPATIENT
Start: 2019-06-04 | End: 2019-06-04

## 2019-06-04 RX ORDER — BENZONATATE 100 MG/1
100 CAPSULE ORAL 3 TIMES DAILY PRN
Status: DISCONTINUED | OUTPATIENT
Start: 2019-06-04 | End: 2019-06-08 | Stop reason: HOSPADM

## 2019-06-04 RX ORDER — PREDNISONE 20 MG/1
40 TABLET ORAL DAILY
Status: DISCONTINUED | OUTPATIENT
Start: 2019-06-07 | End: 2019-06-08 | Stop reason: HOSPADM

## 2019-06-04 RX ORDER — AZELASTINE HYDROCHLORIDE, FLUTICASONE PROPIONATE 137; 50 UG/1; UG/1
1 SPRAY, METERED NASAL DAILY
Status: DISCONTINUED | OUTPATIENT
Start: 2019-06-04 | End: 2019-06-07 | Stop reason: RX

## 2019-06-04 RX ORDER — MIRTAZAPINE 30 MG/1
30 TABLET, FILM COATED ORAL NIGHTLY
Status: DISCONTINUED | OUTPATIENT
Start: 2019-06-04 | End: 2019-06-08 | Stop reason: HOSPADM

## 2019-06-04 RX ORDER — PREDNISONE 20 MG/1
40 TABLET ORAL DAILY
Status: DISCONTINUED | OUTPATIENT
Start: 2019-06-07 | End: 2019-06-04

## 2019-06-04 RX ORDER — ALBUTEROL SULFATE 90 UG/1
2 AEROSOL, METERED RESPIRATORY (INHALATION) 4 TIMES DAILY PRN
Status: DISCONTINUED | OUTPATIENT
Start: 2019-06-04 | End: 2019-06-08 | Stop reason: HOSPADM

## 2019-06-04 RX ORDER — SODIUM CHLORIDE 0.9 % (FLUSH) 0.9 %
10 SYRINGE (ML) INJECTION PRN
Status: DISCONTINUED | OUTPATIENT
Start: 2019-06-04 | End: 2019-06-08 | Stop reason: HOSPADM

## 2019-06-04 RX ORDER — METHYLPREDNISOLONE SODIUM SUCCINATE 125 MG/2ML
125 INJECTION, POWDER, LYOPHILIZED, FOR SOLUTION INTRAMUSCULAR; INTRAVENOUS ONCE
Status: COMPLETED | OUTPATIENT
Start: 2019-06-04 | End: 2019-06-04

## 2019-06-04 RX ORDER — NADOLOL 40 MG/1
40 TABLET ORAL DAILY
Status: DISCONTINUED | OUTPATIENT
Start: 2019-06-04 | End: 2019-06-08 | Stop reason: HOSPADM

## 2019-06-04 RX ORDER — ACETAMINOPHEN 325 MG/1
650 TABLET ORAL EVERY 4 HOURS PRN
Status: DISCONTINUED | OUTPATIENT
Start: 2019-06-04 | End: 2019-06-08 | Stop reason: HOSPADM

## 2019-06-04 RX ORDER — AMITRIPTYLINE HYDROCHLORIDE 25 MG/1
25 TABLET, FILM COATED ORAL NIGHTLY
Status: DISCONTINUED | OUTPATIENT
Start: 2019-06-04 | End: 2019-06-08 | Stop reason: HOSPADM

## 2019-06-04 RX ORDER — LEVOFLOXACIN 5 MG/ML
500 INJECTION, SOLUTION INTRAVENOUS EVERY 24 HOURS
Status: DISCONTINUED | OUTPATIENT
Start: 2019-06-05 | End: 2019-06-06

## 2019-06-04 RX ORDER — ACETAMINOPHEN 500 MG
1000 TABLET ORAL ONCE
Status: COMPLETED | OUTPATIENT
Start: 2019-06-04 | End: 2019-06-04

## 2019-06-04 RX ORDER — IPRATROPIUM BROMIDE AND ALBUTEROL SULFATE 2.5; .5 MG/3ML; MG/3ML
1 SOLUTION RESPIRATORY (INHALATION) 4 TIMES DAILY
Status: DISCONTINUED | OUTPATIENT
Start: 2019-06-04 | End: 2019-06-08 | Stop reason: HOSPADM

## 2019-06-04 RX ORDER — METHYLPREDNISOLONE SODIUM SUCCINATE 40 MG/ML
40 INJECTION, POWDER, LYOPHILIZED, FOR SOLUTION INTRAMUSCULAR; INTRAVENOUS EVERY 12 HOURS
Status: DISCONTINUED | OUTPATIENT
Start: 2019-06-04 | End: 2019-06-04

## 2019-06-04 RX ORDER — SODIUM CHLORIDE 0.9 % (FLUSH) 0.9 %
10 SYRINGE (ML) INJECTION EVERY 12 HOURS SCHEDULED
Status: DISCONTINUED | OUTPATIENT
Start: 2019-06-04 | End: 2019-06-08 | Stop reason: HOSPADM

## 2019-06-04 RX ADMIN — IPRATROPIUM BROMIDE AND ALBUTEROL SULFATE 1 AMPULE: .5; 3 SOLUTION RESPIRATORY (INHALATION) at 15:46

## 2019-06-04 RX ADMIN — LEVOTHYROXINE SODIUM 150 MCG: 150 TABLET ORAL at 16:52

## 2019-06-04 RX ADMIN — SODIUM CHLORIDE: 9 INJECTION, SOLUTION INTRAVENOUS at 16:54

## 2019-06-04 RX ADMIN — IPRATROPIUM BROMIDE AND ALBUTEROL SULFATE 1 AMPULE: .5; 3 SOLUTION RESPIRATORY (INHALATION) at 11:26

## 2019-06-04 RX ADMIN — ACETAMINOPHEN 1000 MG: 500 TABLET ORAL at 11:26

## 2019-06-04 RX ADMIN — AMITRIPTYLINE HYDROCHLORIDE 25 MG: 25 TABLET, FILM COATED ORAL at 21:20

## 2019-06-04 RX ADMIN — METHYLPREDNISOLONE SODIUM SUCCINATE 125 MG: 125 INJECTION, POWDER, FOR SOLUTION INTRAMUSCULAR; INTRAVENOUS at 11:26

## 2019-06-04 RX ADMIN — ALBUTEROL SULFATE 2.5 MG: 2.5 SOLUTION RESPIRATORY (INHALATION) at 12:41

## 2019-06-04 RX ADMIN — LEVOFLOXACIN 500 MG: 5 INJECTION, SOLUTION INTRAVENOUS at 13:26

## 2019-06-04 RX ADMIN — ALBUTEROL SULFATE 2.5 MG: 2.5 SOLUTION RESPIRATORY (INHALATION) at 11:26

## 2019-06-04 RX ADMIN — NADOLOL 40 MG: 40 TABLET ORAL at 16:52

## 2019-06-04 RX ADMIN — ASPIRIN 81 MG 81 MG: 81 TABLET ORAL at 16:52

## 2019-06-04 RX ADMIN — MIRTAZAPINE 30 MG: 30 TABLET, FILM COATED ORAL at 21:20

## 2019-06-04 RX ADMIN — IPRATROPIUM BROMIDE AND ALBUTEROL SULFATE 1 AMPULE: .5; 3 SOLUTION RESPIRATORY (INHALATION) at 19:25

## 2019-06-04 RX ADMIN — Medication 10 ML: at 21:20

## 2019-06-04 ASSESSMENT — PAIN SCALES - GENERAL: PAINLEVEL_OUTOF10: 0

## 2019-06-04 NOTE — PROGRESS NOTES
Patient has no complaints at this time. Vitals are stable and patient reports feeling much better after antibiotic administration.  Lisa Chan RN

## 2019-06-04 NOTE — PROGRESS NOTES
Shift assessment complete, morning medications given, patient resting with no complaints of pain, will continue to monitor.  Kathia Rooney RN

## 2019-06-04 NOTE — ED PROVIDER NOTES
Patient presents to the emergency department with complaint of persistent worsening cough with fever. She has just filled her Zithromax yesterday. She had forgotten that she had [employer] debit card to someone so when she was written a perception 5 days ago she didn't fill it. She did have Tessalon Perles and albuterol at home that they have not been helping. She has continued to cough and has had reflux and symptoms and phlegm. No diarrhea no dysuria no actual vomiting. PAST MEDICAL HISTORY   has a past medical history of Anxiety, Depression, Hyperlipidemia, Hypertension, and Rash. PAST SURGICAL HISTORY   has a past surgical history that includes Cholecystectomy and Hysterectomy, total abdominal.    FAMILY HISTORY  family history includes Asthma in her sister; Diabetes in her mother; High Blood Pressure in her mother. SOCIAL HISTORY   reports that she has never smoked. She has never used smokeless tobacco. She reports that she does not drink alcohol or use drugs. HOME MEDICATIONS     Prior to Admission medications    Medication Sig Start Date End Date Taking?  Authorizing Provider   albuterol sulfate  (90 Base) MCG/ACT inhaler Inhale 2 puffs into the lungs 4 times daily as needed for Wheezing 5/30/19   Luz Almonte MD   azithromycin (ZITHROMAX) 250 MG tablet Take 1 tablet by mouth See Admin Instructions for 5 days 500mg on day 1 followed by 250mg on days 2 - 5 5/30/19 6/4/19  Luz Almonte MD   benzonatate (TESSALON) 100 MG capsule Take 1 capsule by mouth 3 times daily as needed for Cough 5/30/19 6/6/19  Luz Almonte MD   albuterol sulfate  (90 Base) MCG/ACT inhaler Inhale 2 puffs into the lungs 4 times daily as needed for Wheezing 5/30/19   Luz Almonte MD   Azelastine-Fluticasone 137-50 MCG/ACT SUSP 1 spray by Nasal route daily 5/2/19   Melania Mcdonald MD   levothyroxine (SYNTHROID) 150 MCG tablet TAKE 1 TABLET BY MOUTH EVERY DAY 5/2/19   Melania Mcdonald MD nadolol (CORGARD) 20 MG tablet TAKE 2 TABLETS BY MOUTH EVERY DAY 5/2/19   Harry Victor MD   amitriptyline (ELAVIL) 25 MG tablet Take 1 tablet by mouth nightly 5/2/19   Harry Victor MD   traZODone (DESYREL) 50 MG tablet TAKE 1 TABLET BY MOUTH NIGHTLY 5/2/19   Harry Victor MD   ketorolac (TORADOL) 10 MG tablet Take 1 tablet by mouth every 6 hours as needed for Pain (migraines) 5/2/19 5/1/20  Harry Victor MD   sertraline (ZOLOFT) 100 MG tablet Take 2 tablets by mouth daily 5/2/19   Harry Victor MD   mirtazapine (REMERON) 30 MG tablet TAKE 1 TABLET BY MOUTH EVERY DAY AT NIGHT 5/2/19   Harry Victor MD   acetaminophen (TYLENOL) 500 MG tablet Take 1,000 mg by mouth 4/16/16   Lin Mrash MD   aspirin 81 MG chewable tablet Take 1 tablet by mouth daily. 4/3/14   Pola Arshad MD        ALLERGIES  is allergic to penicillins; cefuroxime; doxycycline; imitrex [sumatriptan]; meperidine; sulfa antibiotics; bactrim [sulfamethoxazole-trimethoprim]; keflex [cephalexin]; and tape [adhesive tape]. BP (!) 168/87   Pulse 101   Temp 102.1 °F (38.9 °C) (Oral)   Resp 18   Ht 5' 3\" (1.6 m)   Wt 203 lb (92.1 kg)   SpO2 90%   BMI 35.96 kg/m²           Review of Systems    Physical Exam   Constitutional: She is oriented to person, place, and time. She appears well-developed. HENT:   Head: Normocephalic and atraumatic. Right Ear: External ear normal.   Left Ear: External ear normal.   Mouth/Throat: Oropharynx is clear and moist. No oropharyngeal exudate, posterior oropharyngeal edema or posterior oropharyngeal erythema. Eyes: Pupils are equal, round, and reactive to light. Conjunctivae are normal. Right eye exhibits no discharge. Left eye exhibits no discharge. Neck: Normal range of motion. Neck supple. No JVD present. Cardiovascular: Normal rate, regular rhythm and intact distal pulses. Exam reveals no gallop and no friction rub. No murmur heard.   Pulmonary/Chest: Effort normal. No respiratory distress. Patient speaks in full sentences. But patient frequent leg has a nonproductive cough. On examination she is diminished throughout with end expiratory wheezes no rales rhonchi retractions or stridor. Abdominal: Soft. Bowel sounds are normal. She exhibits no distension and no mass. There is no tenderness. There is no rigidity, no rebound and no guarding. Musculoskeletal: Normal range of motion. She exhibits no edema. Neurological: She is alert and oriented to person, place, and time. She has normal strength. No cranial nerve deficit or sensory deficit. She exhibits normal muscle tone. Coordination normal. GCS eye subscore is 4. GCS verbal subscore is 5. GCS motor subscore is 6. Skin: Skin is warm and dry. No rash noted. She is not diaphoretic. No erythema. Psychiatric: She has a normal mood and affect.  Her behavior is normal.       Procedures    MDM         Labs  Results for orders placed or performed during the hospital encounter of 06/04/19   CBC Auto Differential   Result Value Ref Range    WBC 15.7 (H) 4.0 - 11.0 K/uL    RBC 4.14 4.00 - 5.20 M/uL    Hemoglobin 11.5 (L) 12.0 - 16.0 g/dL    Hematocrit 35.0 (L) 36.0 - 48.0 %    MCV 84.5 80.0 - 100.0 fL    MCH 27.8 26.0 - 34.0 pg    MCHC 32.9 31.0 - 36.0 g/dL    RDW 16.0 (H) 12.4 - 15.4 %    Platelets 779 591 - 572 K/uL    MPV 8.0 5.0 - 10.5 fL    Neutrophils % 76.7 %    Lymphocytes % 13.0 %    Monocytes % 5.8 %    Eosinophils % 3.8 %    Basophils % 0.7 %    Neutrophils # 12.1 (H) 1.7 - 7.7 K/uL    Lymphocytes # 2.0 1.0 - 5.1 K/uL    Monocytes # 0.9 0.0 - 1.3 K/uL    Eosinophils # 0.6 0.0 - 0.6 K/uL    Basophils # 0.1 0.0 - 0.2 K/uL   Comprehensive Metabolic Panel   Result Value Ref Range    Sodium 136 136 - 145 mmol/L    Potassium 4.5 3.5 - 5.1 mmol/L    Chloride 102 99 - 110 mmol/L    CO2 22 21 - 32 mmol/L    Anion Gap 12 3 - 16    Glucose 147 (H) 70 - 99 mg/dL    BUN 12 7 - 20 mg/dL    CREATININE 0.7 0.6 - 1.2 mg/dL    GFR Non- studies of 03/2019. Most likely considerations include either atelectasis or developing asymmetric pulmonary edema. 4. Stable mild chronic interstitial pulmonary fibrosis. 5. Stable mild mediastinal and bilateral hilar lymphadenopathy. 6. Stable cardiomegaly. 7. Diffuse hepatic steatosis. EKG Interpretation. EKG interpreted by Feroz Arshad MD:    Rhythm: normal sinus   Rate: 93  Ectopy: none  Conduction: normal  ST Segments: no acute change  T Waves: no acute change            Emergency Department Course: We discussed the workup. We discussed medications as well as steroids patient has had no problems with steroids before. They state that she can take IV penicillin. She does not recognize fluoroquinolones as an allergy and she has hives when she is given penicillin and cephalosporins. We discussed repeating the x-ray. Is not felt at this time that she would benefit from repeat CAT scan. Patient is reexamined and she has increased aeration but continues to express wheezes and she feels better and looks better but still maintains at 88% sat on room air which placed back on oxygen. She is agreeable to admission to [facility]. Spoke with Elmer Humphrey for Dr. Karina Mckeon and discussed symptoms, exam, objective data and clinical course. Disposition and plan agreed upon. She will admit the patient and give/enter admitting orders. Patient was placed on Levaquin.               The Clinical Impression is respiratory failure with hypoxia       Feroz Arshad MD  06/04/19 9750

## 2019-06-04 NOTE — H&P
Azelastine-Fluticasone 137-50 MCG/ACT SUSP 1 spray by Nasal route daily 5/2/19  Yes Vivian Keyes MD   levothyroxine (SYNTHROID) 150 MCG tablet TAKE 1 TABLET BY MOUTH EVERY DAY 5/2/19  Yes Vivian Keyes MD   nadolol (CORGARD) 20 MG tablet TAKE 2 TABLETS BY MOUTH EVERY DAY 5/2/19  Yes Vivian Keyes MD   amitriptyline (ELAVIL) 25 MG tablet Take 1 tablet by mouth nightly 5/2/19  Yes Vivian Keyes MD   traZODone (DESYREL) 50 MG tablet TAKE 1 TABLET BY MOUTH NIGHTLY 5/2/19  Yes Vivian Keyes MD   ketorolac (TORADOL) 10 MG tablet Take 1 tablet by mouth every 6 hours as needed for Pain (migraines) 5/2/19 5/1/20 Yes Vivian Keyes MD   sertraline (ZOLOFT) 100 MG tablet Take 2 tablets by mouth daily 5/2/19  Yes Vivian Keyes MD   mirtazapine (REMERON) 30 MG tablet TAKE 1 TABLET BY MOUTH EVERY DAY AT NIGHT 5/2/19  Yes Vivian Keyes MD   acetaminophen (TYLENOL) 500 MG tablet Take 1,000 mg by mouth 4/16/16  Yes Lin Marsh MD   aspirin 81 MG chewable tablet Take 1 tablet by mouth daily. 4/3/14  Yes Maximo Arnold MD       Allergies:  Penicillins; Cefuroxime; Doxycycline; Imitrex [sumatriptan]; Meperidine; Sulfa antibiotics; Bactrim [sulfamethoxazole-trimethoprim]; Keflex [cephalexin]; and Tape [adhesive tape]    Social History:  The patient currently lives at home     TOBACCO:   reports that she has never smoked. She has never used smokeless tobacco.  ETOH:   reports that she does not drink alcohol.       Family History:   Positive as follows:        Problem Relation Age of Onset    Diabetes Mother     High Blood Pressure Mother     Asthma Sister        REVIEW OF SYSTEMS:     Constitutional: + fever   HENT: Negative for sore throat   Eyes: Negative for redness   Respiratory: + SOb, + Cough   Cardiovascular: Negative for chest pain   Gastrointestinal: Negative for vomiting, diarrhea   Genitourinary: Negative for hematuria   Musculoskeletal: Negative for arthralgias   Skin: Negative for rash Neurological: Negative for syncope   Hematological: Negative for adenopathy   Psychiatric/Behavorial: Negative for anxiety    PHYSICAL EXAM:    /76   Pulse 81   Temp 97.2 °F (36.2 °C) (Oral)   Resp 18   Ht 5' 3\" (1.6 m)   Wt 203 lb (92.1 kg)   SpO2 94%   BMI 35.96 kg/m²   Gen: No distress. Alert. Eyes: PERRL. No sclera icterus. No conjunctival injection. ENT: No discharge. Pharynx clear. Neck: No JVD. No Carotid Bruit. Trachea midline. Resp: No accessory muscle use. Faint bilateral crackles. +wheezes. No rhonchi. On 2L NC O2  CV: Regular rate. Regular rhythm. Faint murmur. No rub. No edema. Capillary Refill: Brisk,< 3 seconds   Peripheral Pulses: +2 palpable, equal bilaterally   GI: Non-tender. Non-distended. No masses. No organomegaly. Normal bowel sounds. No hernia. Skin: Warm and dry. No nodule on exposed extremities. No rash on exposed extremities. M/S: No cyanosis. No joint deformity. No clubbing. Neuro: Awake. Grossly nonfocal    Psych: Oriented x 3. No anxiety or agitation. I Medina Azul have reviewed the chart on Patt Simmonds and personally interviewed and examined patient, reviewed the data (labs and imaging) and after discussion with my PA formulated the plan. Agree with note with the following edits. HPI:     I reviewed the patient's Past Medical History, Past Surgical History, Medications, and Allergies. 8 y.o. female with anxiety, depression, HLD, HTN who presented to [facility] ED with complaint of cough. Patient states that her symptoms started last week. She reports fevers at home and a productive cough. She states that she went [facility] and was dx with  Bronchitis. She did not fill the abx rx until yesterday however. She continued to have a productive cough at home with fevers up to 103. Recent CT chest neg last week  Now with neg cxr          General:  eldelry female, Awake, alert and oriented.  Appears to be not in any distress  Mucous Membranes:  Pink , anicteric  Neck: No JVD, no carotid bruit, no thyromegaly  Chest:  , diminished kenny with scattered crackles  Cardiovascular:  RRR S1S2 heard, no murmurs or gallops  Abdomen:  Soft, undistended, non tender, no organomegaly, BS present  Extremities: No edema or cyanosis. Distal pulses well felt  Neurological : grossly normal                CBC:   Recent Labs     06/04/19  1130   WBC 15.7*   HGB 11.5*   HCT 35.0*   MCV 84.5        BMP:   Recent Labs     06/04/19  1130      K 4.5      CO2 22   BUN 12   CREATININE 0.7     LIVER PROFILE:   Recent Labs     06/04/19  1130   AST 33   ALT 11   BILITOT 0.5   ALKPHOS 122     UA:  Recent Labs     06/04/19  1257   COLORU Yellow   PHUR 6.0   WBCUA 3-5   RBCUA 0-2   BACTERIA Rare*   CLARITYU Clear   SPECGRAV 1.020   LEUKOCYTESUR MODERATE*   UROBILINOGEN 0.2   BILIRUBINUR Negative   BLOODU Negative   GLUCOSEU Negative     CARDIAC ENZYMES  Recent Labs     06/04/19  1130   TROPONINI <0.01     Lactic Acid 1.2      CULTURES  Resp Film Array: pending   Resp Cx: pending   Blood Cx: pending   Urine Cx: pending     EKG:  I have reviewed the EKG with the following interpretation:   NSR, normal axis, normal interval, no acute ST segment changes concerning for acute ischemia     RADIOLOGY  XR CHEST STANDARD (2 VW)   Final Result   1. Stable mild cardiomegaly. Mild pulmonary vascular congestion. 2. No acute focal airspace consolidation or sizable pleural effusions. Pertinent previous results reviewed   Ct chest 5/30/19    1. No CT evidence of a pulmonary embolism. 2. No acute abnormality of the thoracic aorta. 3. New scattered nonspecific ground-glass opacity throughout both lungs,   increased from the studies of 03/2019.  Most likely considerations include   either atelectasis or developing asymmetric pulmonary edema. 4. Stable mild chronic interstitial pulmonary fibrosis.    5. Stable mild mediastinal and bilateral hilar lymphadenopathy. 6. Stable cardiomegaly. 7. Diffuse hepatic steatosis.          ASSESSMENT/PLAN:    Acute hypoxic respiratory failure   - does not require home O2   - now on 2L NC O2   - Suspect 2/2 bronchitis CAP   - Wean as tolerated     Bronchitis vs CAP   - Dx last week, didn't start Abx until yesterday   - Clinically suspect CAP although CXR with no acute opacity   - Fevers, +WBC +producitve cough, hypoxia   - Continue Levaquin, IBD, Steroids   - Supplemental O2 PRN   - Resp viral array and cx pending     Febrile Illness   - Temperature in .1  - Suspect CAP   - Blood, urine and resp cx pending   - Tylenol PRN   - Fever improved now     HTN  - Continue BB    HLD  - Continue     Hypothyroidism   - Continue Synthroid     Depression   - Continue home medications     DVT Prophylaxis: Lovenox   Diet: DIET GENERAL;   Code Status: Full Code      Mikie Vaughan PA-C  6/4/2019 4:26 PM     Check procacitonin, resp viral panel  Echo   Agree with above  Changes made to note    Jada Vora MD 6/4/2019 10:13 PM

## 2019-06-04 NOTE — PROGRESS NOTES
RESPIRATORY THERAPY ASSESSMENT    Name:  Norma Garcia  Medical Record Number:  [id number]  Age: 75 y.o. Gender: female  : 1955  Today's Date:  2019  Room:  [facility]0220-01    Assessment     Is the patient being admitted for a COPD or Asthma exacerbation? No   (If yes the patient will be seen every 4 hours for the first 24 hours and then reassessed)    Patient Admission Diagnosis      Allergies  Allergies   Allergen Reactions    Penicillins Anaphylaxis    Cefuroxime     Doxycycline Hives    Imitrex [Sumatriptan] Other (See Comments)     Feels like pins and needles    Meperidine     Sulfa Antibiotics Hives    Bactrim [Sulfamethoxazole-Trimethoprim] Rash    Keflex [Cephalexin] Itching and Rash    Tape [Adhesive Tape] Rash       Minimum Predicted Vital Capacity:     782          Actual Vital Capacity:      984              Pulmonary History:No history  Home Oxygen Therapy:  room air  Home Respiratory Therapy:Albuterol   Current Respiratory Therapy:  Duoneb hhn QID  Treatment Type: [facility]  Medications: Albuterol/Ipratropium    Respiratory Severity Index(RSI)   Patients with orders for inhalation medications, oxygen, or any therapeutic treatment modality will be placed on Respiratory Protocol. They will be assessed with the first treatment and at least every 72 hours thereafter. The following severity scale will be used to determine frequency of treatment intervention.     Smoking History: No Smoking History = 0    Social History  Social History     Tobacco Use    Smoking status: Never Smoker    Smokeless tobacco: Never Used   Substance Use Topics    Alcohol use: No    Drug use: No       Recent Surgical History: None = 0  Past Surgical History  Past Surgical History:   Procedure Laterality Date    CHOLECYSTECTOMY      HYSTERECTOMY, TOTAL ABDOMINAL         Level of Consciousness: Alert, Oriented, and Cooperative = 0    Level of Activity: Walking with assistance = 1    Respiratory Pattern: Dyspnea with exertion;Irregular pattern;or RR less than 6 = 2    Breath Sounds: Diminshed bilaterally and/or crackles = 2    Sputum   ,  , Sputum How Obtained: Spontaneous cough  Cough: Strong, productive = 1    Vital Signs   /63   Pulse 85   Temp 99.8 °F (37.7 °C) (Oral)   Resp 18   Ht 5' 3\" (1.6 m)   Wt 203 lb (92.1 kg)   SpO2 95%   BMI 35.96 kg/m²   SPO2 (COPD values may differ): 90-91% on room air or greater than 92% on FiO2 24- 28% = 1    Peak Flow (asthma only): not applicable = 0    RSI: 7-8 = BID and Q4HPRN (every four hours as needed) for dyspnea        Plan       Goals: medication delivery    Patient/caregiver was educated on the proper method of use for Respiratory Care Devices:  Yes      Level of patient/caregiver understanding able to:   ? Verbalize understanding   ? Demonstrate understanding       ? Teach back        ? Needs reinforcement       ? No available caregiver               ? Other:     Response to education:  Excellent     Is patient being placed on Home Treatment Regimen? Yes     Does the patient have everything they need prior to discharge? NA     Comments: Chart and home meds reviewed    Plan of Care: Cont the patient on the current therapy    Electronically signed by Jimmy Yan RCP on 6/4/2019 at 3:56 PM    Respiratory Protocol Guidelines     1. Assessment and treatment by Respiratory Therapy will be initiated for medication and therapeutic interventions upon initiation of aerosolized medication. 2. Physician will be contacted for respiratory rate (RR) greater than 35 breaths per minute. Therapy will be held for heart rate (HR) greater than 140 beats per minute, pending direction from physician. 3. Bronchodilators will be administered via Metered Dose Inhaler (MDI) with spacer when the following criteria are met:  a. Alert and cooperative     b. HR < 140 bpm  c. RR < 30 bpm                d. Can demonstrate a 2-3 second inspiratory hold  4.  Bronchodilators will be administered via Hand Held Nebulizer [facility]) to patients when ANY of the following criteria are met  a. Incognizant or uncooperative          b. Patients treated with HHN at Home        c. Unable to demonstrate proper use of MDI with spacer     d. RR > 30 bpm   5. Bronchodilators will be delivered via Metered Dose Inhaler (MDI), HHN, Aerogen to intubated patients on mechanical ventilation. 6. Inhalation medication orders will be delivered and/or substituted as outlined below. Aerosolized Medications Ordering and Administration Guidelines:    1. All Medications will be ordered by a physician, and their frequency and/or modality will be adjusted as defined by the patients Respiratory Severity Index (RSI) score. 2. If the patient does not have documented COPD, consider discontinuing anticholinergics when RSI is less than 9.  3. If the bronchospasm worsens (increased RSI), then the bronchodilator frequency can be increased to a maximum of every 4 hours. If greater than every 4 hours is required, the physician will be contacted. 4. If the bronchospasm improves, the frequency of the bronchodilator can be decreased, based on the patient's RSI, but not less than home treatment regimen frequency. 5. Bronchodilator(s) will be discontinued if patient has a RSI less than 9 and has received no scheduled or as needed treatment for 72  Hrs. Patients Ordered on a Mucolytic Agent:    1. Must always be administered with a bronchodilator. 2. Discontinue if patient experiences worsened bronchospasm, or secretions have lessened to the point that the patient is able to clear them with a cough. Anti-inflammatory and Combination Medications:    1. If the patient lacks prior history of lung disease, is not using inhaled anti-inflammatory medication at home, and lacks wheezing by examination or by history for at least 24 hours, contact physician for possible discontinuation.

## 2019-06-05 LAB
ANION GAP SERPL CALCULATED.3IONS-SCNC: 10 MMOL/L (ref 3–16)
BASOPHILS ABSOLUTE: 0 K/UL (ref 0–0.2)
BASOPHILS RELATIVE PERCENT: 0.3 %
BUN BLDV-MCNC: 14 MG/DL (ref 7–20)
CALCIUM SERPL-MCNC: 8.3 MG/DL (ref 8.3–10.6)
CHLORIDE BLD-SCNC: 105 MMOL/L (ref 99–110)
CO2: 20 MMOL/L (ref 21–32)
CREAT SERPL-MCNC: 0.6 MG/DL (ref 0.6–1.2)
EOSINOPHILS ABSOLUTE: 0 K/UL (ref 0–0.6)
EOSINOPHILS RELATIVE PERCENT: 0.2 %
GFR AFRICAN AMERICAN: >60
GFR NON-AFRICAN AMERICAN: >60
GLUCOSE BLD-MCNC: 125 MG/DL (ref 70–99)
HCT VFR BLD CALC: 32.7 % (ref 36–48)
HEMOGLOBIN: 10.8 G/DL (ref 12–16)
L. PNEUMOPHILA SEROGP 1 UR AG: NORMAL
LV EF: 58 %
LVEF MODALITY: NORMAL
LYMPHOCYTES ABSOLUTE: 2 K/UL (ref 1–5.1)
LYMPHOCYTES RELATIVE PERCENT: 15 %
MCH RBC QN AUTO: 28.2 PG (ref 26–34)
MCHC RBC AUTO-ENTMCNC: 32.9 G/DL (ref 31–36)
MCV RBC AUTO: 85.6 FL (ref 80–100)
MONOCYTES ABSOLUTE: 0.8 K/UL (ref 0–1.3)
MONOCYTES RELATIVE PERCENT: 6.2 %
NEUTROPHILS ABSOLUTE: 10.5 K/UL (ref 1.7–7.7)
NEUTROPHILS RELATIVE PERCENT: 78.3 %
PDW BLD-RTO: 16.3 % (ref 12.4–15.4)
PLATELET # BLD: 218 K/UL (ref 135–450)
PMV BLD AUTO: 7.7 FL (ref 5–10.5)
POTASSIUM REFLEX MAGNESIUM: 3.7 MMOL/L (ref 3.5–5.1)
RBC # BLD: 3.82 M/UL (ref 4–5.2)
REPORT: NORMAL
RESPIRATORY PANEL PCR: NORMAL
SODIUM BLD-SCNC: 135 MMOL/L (ref 136–145)
WBC # BLD: 13.4 K/UL (ref 4–11)

## 2019-06-05 PROCEDURE — 6370000000 HC RX 637 (ALT 250 FOR IP): Performed by: HOSPITALIST

## 2019-06-05 PROCEDURE — 99232 SBSQ HOSP IP/OBS MODERATE 35: CPT | Performed by: INTERNAL MEDICINE

## 2019-06-05 PROCEDURE — 94640 AIRWAY INHALATION TREATMENT: CPT

## 2019-06-05 PROCEDURE — 2700000000 HC OXYGEN THERAPY PER DAY

## 2019-06-05 PROCEDURE — 93306 TTE W/DOPPLER COMPLETE: CPT

## 2019-06-05 PROCEDURE — 87070 CULTURE OTHR SPECIMN AEROBIC: CPT

## 2019-06-05 PROCEDURE — 36415 COLL VENOUS BLD VENIPUNCTURE: CPT

## 2019-06-05 PROCEDURE — 87449 NOS EACH ORGANISM AG IA: CPT

## 2019-06-05 PROCEDURE — 6360000002 HC RX W HCPCS: Performed by: PHYSICIAN ASSISTANT

## 2019-06-05 PROCEDURE — 6370000000 HC RX 637 (ALT 250 FOR IP): Performed by: PHYSICIAN ASSISTANT

## 2019-06-05 PROCEDURE — 1200000000 HC SEMI PRIVATE

## 2019-06-05 PROCEDURE — 6370000000 HC RX 637 (ALT 250 FOR IP): Performed by: INTERNAL MEDICINE

## 2019-06-05 PROCEDURE — 2580000003 HC RX 258: Performed by: PHYSICIAN ASSISTANT

## 2019-06-05 PROCEDURE — 80048 BASIC METABOLIC PNL TOTAL CA: CPT

## 2019-06-05 PROCEDURE — 6360000002 HC RX W HCPCS: Performed by: INTERNAL MEDICINE

## 2019-06-05 PROCEDURE — 94761 N-INVAS EAR/PLS OXIMETRY MLT: CPT

## 2019-06-05 PROCEDURE — 85025 COMPLETE CBC W/AUTO DIFF WBC: CPT

## 2019-06-05 PROCEDURE — 87205 SMEAR GRAM STAIN: CPT

## 2019-06-05 RX ORDER — GUAIFENESIN 100 MG/5ML
200 SOLUTION ORAL EVERY 4 HOURS PRN
Status: DISCONTINUED | OUTPATIENT
Start: 2019-06-05 | End: 2019-06-08 | Stop reason: HOSPADM

## 2019-06-05 RX ADMIN — LEVOFLOXACIN 500 MG: 5 INJECTION, SOLUTION INTRAVENOUS at 10:05

## 2019-06-05 RX ADMIN — ACETAMINOPHEN 650 MG: 325 TABLET ORAL at 11:04

## 2019-06-05 RX ADMIN — IPRATROPIUM BROMIDE AND ALBUTEROL SULFATE 1 AMPULE: .5; 3 SOLUTION RESPIRATORY (INHALATION) at 15:27

## 2019-06-05 RX ADMIN — NADOLOL 40 MG: 40 TABLET ORAL at 10:10

## 2019-06-05 RX ADMIN — LEVOTHYROXINE SODIUM 150 MCG: 150 TABLET ORAL at 04:27

## 2019-06-05 RX ADMIN — IPRATROPIUM BROMIDE AND ALBUTEROL SULFATE 1 AMPULE: .5; 3 SOLUTION RESPIRATORY (INHALATION) at 19:30

## 2019-06-05 RX ADMIN — GUAIFENESIN 200 MG: 100 SOLUTION ORAL at 11:04

## 2019-06-05 RX ADMIN — SODIUM CHLORIDE: 9 INJECTION, SOLUTION INTRAVENOUS at 21:16

## 2019-06-05 RX ADMIN — METHYLPREDNISOLONE SODIUM SUCCINATE 40 MG: 40 INJECTION, POWDER, FOR SOLUTION INTRAMUSCULAR; INTRAVENOUS at 21:13

## 2019-06-05 RX ADMIN — Medication 10 ML: at 10:05

## 2019-06-05 RX ADMIN — METHYLPREDNISOLONE SODIUM SUCCINATE 40 MG: 40 INJECTION, POWDER, FOR SOLUTION INTRAMUSCULAR; INTRAVENOUS at 10:05

## 2019-06-05 RX ADMIN — MIRTAZAPINE 30 MG: 30 TABLET, FILM COATED ORAL at 21:13

## 2019-06-05 RX ADMIN — BENZONATATE 100 MG: 100 CAPSULE ORAL at 04:26

## 2019-06-05 RX ADMIN — AMITRIPTYLINE HYDROCHLORIDE 25 MG: 25 TABLET, FILM COATED ORAL at 21:13

## 2019-06-05 RX ADMIN — ENOXAPARIN SODIUM 40 MG: 100 INJECTION SUBCUTANEOUS at 10:05

## 2019-06-05 RX ADMIN — GUAIFENESIN 200 MG: 100 SOLUTION ORAL at 17:06

## 2019-06-05 RX ADMIN — ACETAMINOPHEN 650 MG: 325 TABLET ORAL at 17:06

## 2019-06-05 RX ADMIN — GUAIFENESIN 200 MG: 100 SOLUTION ORAL at 06:49

## 2019-06-05 RX ADMIN — ASPIRIN 81 MG 81 MG: 81 TABLET ORAL at 10:06

## 2019-06-05 RX ADMIN — SODIUM CHLORIDE: 9 INJECTION, SOLUTION INTRAVENOUS at 04:26

## 2019-06-05 RX ADMIN — IPRATROPIUM BROMIDE AND ALBUTEROL SULFATE 1 AMPULE: .5; 3 SOLUTION RESPIRATORY (INHALATION) at 11:30

## 2019-06-05 ASSESSMENT — PAIN DESCRIPTION - PROGRESSION: CLINICAL_PROGRESSION: GRADUALLY IMPROVING

## 2019-06-05 ASSESSMENT — PAIN DESCRIPTION - PAIN TYPE
TYPE: ACUTE PAIN
TYPE: ACUTE PAIN

## 2019-06-05 ASSESSMENT — PAIN DESCRIPTION - FREQUENCY
FREQUENCY: INTERMITTENT
FREQUENCY: INTERMITTENT

## 2019-06-05 ASSESSMENT — PAIN - FUNCTIONAL ASSESSMENT: PAIN_FUNCTIONAL_ASSESSMENT: ACTIVITIES ARE NOT PREVENTED

## 2019-06-05 ASSESSMENT — PAIN SCALES - GENERAL
PAINLEVEL_OUTOF10: 0
PAINLEVEL_OUTOF10: 1
PAINLEVEL_OUTOF10: 6
PAINLEVEL_OUTOF10: 6
PAINLEVEL_OUTOF10: 4

## 2019-06-05 ASSESSMENT — PAIN DESCRIPTION - DIRECTION
RADIATING_TOWARDS: DOES NOT RADIATE
RADIATING_TOWARDS: DOES NOT RADIATE

## 2019-06-05 ASSESSMENT — PAIN DESCRIPTION - LOCATION
LOCATION: HEAD
LOCATION: HEAD

## 2019-06-05 ASSESSMENT — PAIN DESCRIPTION - ONSET
ONSET: GRADUAL
ONSET: ON-GOING

## 2019-06-05 ASSESSMENT — PAIN DESCRIPTION - DESCRIPTORS
DESCRIPTORS: HEADACHE
DESCRIPTORS: HEADACHE

## 2019-06-05 NOTE — PROGRESS NOTES
22 20*   BUN 12 14   CREATININE 0.7 0.6     Recent Labs     06/04/19  1130   TROPONINI <0.01       Coagulation:   Lab Results   Component Value Date    INR 1.05 06/16/2014    APTT 25.7 06/16/2014     Cardiac markers:   Lab Results   Component Value Date    TROPONINI <0.01 06/04/2019         Lab Results   Component Value Date    ALT 11 06/04/2019    AST 33 06/04/2019    ALKPHOS 122 06/04/2019    BILITOT 0.5 06/04/2019       Lab Results   Component Value Date    INR 1.05 06/16/2014    PROTIME 11.4 06/16/2014       Radiology    Chest xray       ECHO        Summary   Normal LV systolic function with an estimated EF of 55-60%.   No regional wall motion abnormalities are seen.  Wilhelmena Rasher is borderline concentric left ventricular hypertrophy.   Grade II diastolic dysfunction with elevated filling pressure.   Mild mitral annular calcification.   The right atrium is mildly dilated.   Mild to moderate mitral and tricuspid regurgitation.   Mild pulmonic regurgitation present.   Systolic pulmonary artery pressure (SPAP) estimated at 42mmHg (RA pressure   8mmHg), consistent with mild pulmonary hypertension      CULTURES  Resp Film Array: pending   Resp Cx: pending   Blood Cx: pending   Urine Cx: pending      EKG:  I have reviewed the EKG with the following interpretation:   NSR, normal axis, normal interval, no acute ST segment changes concerning for acute ischemia      RADIOLOGY  XR CHEST STANDARD (2 VW)   Final Result   1. Stable mild cardiomegaly. Mild pulmonary vascular congestion. 2. No acute focal airspace consolidation or sizable pleural effusions.                    Pertinent previous results reviewed   Ct chest 5/30/19     1. No CT evidence of a pulmonary embolism. 2. No acute abnormality of the thoracic aorta.    3. New scattered nonspecific ground-glass opacity throughout both lungs,   increased from the studies of 03/2019.  Most likely considerations include   either atelectasis or developing asymmetric pulmonary edema.   4. Stable mild chronic interstitial pulmonary fibrosis. 5. Stable mild mediastinal and bilateral hilar lymphadenopathy. 6. Stable cardiomegaly. 7. Diffuse hepatic steatosis.             ASSESSMENT/PLAN:     Acute hypoxic respiratory failure   - does not require home O2   - now on 2L NC O2   - Suspect 2/2 tracheobronchitis and bronchospasm   - Wean as tolerated   - previous PFT neg for any copd   - echo with normal EF     Acute tracheo Bronchitis   - Dx last week, didn't start Abx until yesterday   -  Ct chest last week neg  - Fevers, +WBC +producitve cough, hypoxia   - Continue Levaquin, IBD, Steroids   - Supplemental O2 PRN   - Resp viral array and cx pending    - fevers resolved     HTN  - Continue BB     HLD  - Continue      Hypothyroidism   - Continue Synthroid      Depression   - Continue home medications      DVT Prophylaxis: Lovenox   Diet: DIET GENERAL;   Code Status: Full Code        Clif Kenny MD 6/5/2019 1:57 PM

## 2019-06-05 NOTE — CARE COORDINATION
Case Management Assessment  Initial Evaluation    Date/Time of Evaluation: 6/5/2019 3:34 PM  Assessment Completed by: Nata Coyne    Patient Name: Jigna Abreu  YOB: 1955  Diagnosis: Acute respiratory distress [R06.03]  Acute respiratory failure with hypoxia ([address] [J96.01]  Date / Time: 6/4/2019 10:26 AM  Admission status/Date: 06/04/19  Chart Reviewed: Yes      Patient Interviewed: Yes   Family Interviewed:  No      Hospitalization in the last 30 days:  No    Contacts  :     Relationship to Patient:   Phone Number:    Alternate Contact:     Relationship to Patient:     Phone Number:    Met with:    Current PCP [address] Medicare  Precert required for SNF : Y, N        3 night stay required - Y, N    ADLS  Support Systems: Spouse/Significant Other, Children, Family Members  Transportation: self    Meal Preparation: self    Housing  Home Environment: house  Steps: 2  Plans to Return to Present Housing: Yes  Other Identified Issues: Dion Leggettn  Currently active with [address] : No     Passport/Waiver : No  :                      Phone Number:    Passport/Waiver Services:  Jaylene Michelle   [facility] Provider: n/a  Equipment: Walker__Cane__RTS__ BSC__Shower Chair__  02__ HHN__ CPAP__  BiPap__  Hospital Bed__ W/C___ Other__________      Has Home O2 in place on admit:  Yes  Informed of need to bring portable home O2 tank on day of discharge for nursing to connect prior to leaving:   Not Indicated  Verbalized agreement/Understanding:   Not Indicated    Community Service Affiliation  Dialysis:  No    · Name:  · Location  · Dialysis Schedule:  · Phone:   · Fax: Outpatient PT/OT: No    Cancer Center: No     CHF Clinic: No     Pulmonary Rehab: No  Pain Clinic: No  Community Mental Health: No    Wound Clinic: No     Other: n/a    DISCHARGE PLAN: Plans to return home with sp. CARLOS-juan. CM to follow.   Explained Case Management role/services.

## 2019-06-05 NOTE — FLOWSHEET NOTE
06/05/19 1000   Vital Signs   Temp 98.1 °F (36.7 °C)   Temp Source Oral   Pulse 81   Resp 18   /75   BP Location Right upper arm   BP Upper/Lower Upper   Patient Position Semi fowlers   Level of Consciousness 0   MEWS Score 1   Patient Currently in Pain Yes   Pain Assessment   Pain Assessment 0-10   Pain Level 6   Pain Location Head   Pain Radiating Towards does not radiate   Pain Onset Gradual   Pain Frequency Intermittent   Functional Pain Assessment Activities are not prevented   Pain Type Acute pain   Patient's Stated Pain Goal 6  (tolerable level )   Pain Descriptors Headache   Multiple Pain Sites No   Oxygen Therapy   SpO2 92 %   O2 Device Nasal cannula   O2 Flow Rate (L/min) 2 L/min     Shift assessment completed, see flow sheet. Morning medications given. Pt a/o x 4 c/o dyspnea with exertion. Respirations are easy, even, and unlabored. Bilateral lung sounds clear, but diminished in bases. PIV WNL with 0.9% sodium chloride infusing @ 75 mL/hr and Levaquin infusing @ 100 mL/hr. Pt denies any needs at this time. Call light and bedside table within reach. Will continue to monitor.

## 2019-06-05 NOTE — PROGRESS NOTES
spo2 85% ra at rest, spo2 95% on 2lnc at rest, spo2 dropped to 85% 2lnc with activity, oxygen increased to 3lnc, spo2 recovered to 92% 3lnc at rest, spo2 dropped to 87% 3lnc with activity, oxygen increased to 4lnc, spo2 recovered to 92% 4lnc at rest, spo2 90% 4lnc with activity. Pt very short of breath  With minimal activity.  Writer informed pt's primary nurse, writer to notify [employer], RN\

## 2019-06-05 NOTE — PROGRESS NOTES
Shift assessment completed. See flow sheet. Respiration easy, even and non labored. Pt does become dyspneic with exertion. Vital signs WNL. Pt alert and oriented x 4 . Side rails up x 2. IVF infusing without complications. Pt denies any needs at this time. Call light within reach. Will continue to monitor.

## 2019-06-06 LAB — URINE CULTURE, ROUTINE: NORMAL

## 2019-06-06 PROCEDURE — 2700000000 HC OXYGEN THERAPY PER DAY

## 2019-06-06 PROCEDURE — 6370000000 HC RX 637 (ALT 250 FOR IP): Performed by: PHYSICIAN ASSISTANT

## 2019-06-06 PROCEDURE — 6360000002 HC RX W HCPCS: Performed by: PHYSICIAN ASSISTANT

## 2019-06-06 PROCEDURE — 99232 SBSQ HOSP IP/OBS MODERATE 35: CPT | Performed by: INTERNAL MEDICINE

## 2019-06-06 PROCEDURE — 6370000000 HC RX 637 (ALT 250 FOR IP): Performed by: INTERNAL MEDICINE

## 2019-06-06 PROCEDURE — 2580000003 HC RX 258: Performed by: PHYSICIAN ASSISTANT

## 2019-06-06 PROCEDURE — 94761 N-INVAS EAR/PLS OXIMETRY MLT: CPT

## 2019-06-06 PROCEDURE — 6360000002 HC RX W HCPCS: Performed by: INTERNAL MEDICINE

## 2019-06-06 PROCEDURE — 6370000000 HC RX 637 (ALT 250 FOR IP): Performed by: HOSPITALIST

## 2019-06-06 PROCEDURE — 1200000000 HC SEMI PRIVATE

## 2019-06-06 PROCEDURE — 94640 AIRWAY INHALATION TREATMENT: CPT

## 2019-06-06 RX ORDER — LEVOFLOXACIN 500 MG/1
500 TABLET, FILM COATED ORAL DAILY
Status: DISCONTINUED | OUTPATIENT
Start: 2019-06-07 | End: 2019-06-08 | Stop reason: HOSPADM

## 2019-06-06 RX ORDER — FUROSEMIDE 10 MG/ML
20 INJECTION INTRAMUSCULAR; INTRAVENOUS ONCE
Status: COMPLETED | OUTPATIENT
Start: 2019-06-06 | End: 2019-06-06

## 2019-06-06 RX ADMIN — IPRATROPIUM BROMIDE AND ALBUTEROL SULFATE 1 AMPULE: .5; 3 SOLUTION RESPIRATORY (INHALATION) at 07:34

## 2019-06-06 RX ADMIN — LEVOFLOXACIN 500 MG: 5 INJECTION, SOLUTION INTRAVENOUS at 09:57

## 2019-06-06 RX ADMIN — IPRATROPIUM BROMIDE AND ALBUTEROL SULFATE 1 AMPULE: .5; 3 SOLUTION RESPIRATORY (INHALATION) at 11:19

## 2019-06-06 RX ADMIN — ASPIRIN 81 MG 81 MG: 81 TABLET ORAL at 09:57

## 2019-06-06 RX ADMIN — Medication 10 ML: at 21:23

## 2019-06-06 RX ADMIN — FUROSEMIDE 20 MG: 10 INJECTION, SOLUTION INTRAVENOUS at 14:07

## 2019-06-06 RX ADMIN — IPRATROPIUM BROMIDE AND ALBUTEROL SULFATE 1 AMPULE: .5; 3 SOLUTION RESPIRATORY (INHALATION) at 19:18

## 2019-06-06 RX ADMIN — ACETAMINOPHEN 650 MG: 325 TABLET ORAL at 18:24

## 2019-06-06 RX ADMIN — METHYLPREDNISOLONE SODIUM SUCCINATE 40 MG: 40 INJECTION, POWDER, FOR SOLUTION INTRAMUSCULAR; INTRAVENOUS at 21:23

## 2019-06-06 RX ADMIN — METHYLPREDNISOLONE SODIUM SUCCINATE 40 MG: 40 INJECTION, POWDER, FOR SOLUTION INTRAMUSCULAR; INTRAVENOUS at 09:57

## 2019-06-06 RX ADMIN — SODIUM CHLORIDE: 9 INJECTION, SOLUTION INTRAVENOUS at 09:58

## 2019-06-06 RX ADMIN — LEVOTHYROXINE SODIUM 150 MCG: 150 TABLET ORAL at 05:36

## 2019-06-06 RX ADMIN — GUAIFENESIN 200 MG: 100 SOLUTION ORAL at 01:46

## 2019-06-06 RX ADMIN — AMITRIPTYLINE HYDROCHLORIDE 25 MG: 25 TABLET, FILM COATED ORAL at 21:23

## 2019-06-06 RX ADMIN — IPRATROPIUM BROMIDE AND ALBUTEROL SULFATE 1 AMPULE: .5; 3 SOLUTION RESPIRATORY (INHALATION) at 15:16

## 2019-06-06 RX ADMIN — NADOLOL 40 MG: 40 TABLET ORAL at 09:57

## 2019-06-06 RX ADMIN — GUAIFENESIN 200 MG: 100 SOLUTION ORAL at 18:24

## 2019-06-06 RX ADMIN — MIRTAZAPINE 30 MG: 30 TABLET, FILM COATED ORAL at 21:23

## 2019-06-06 ASSESSMENT — PAIN SCALES - GENERAL
PAINLEVEL_OUTOF10: 2
PAINLEVEL_OUTOF10: 5
PAINLEVEL_OUTOF10: 0

## 2019-06-06 NOTE — PROGRESS NOTES
IM Progress Note    Admit Date:  6/4/2019  2    Interval history:  Remains on oxygen   No fevers overnight     Subjective:  Ms. Fitz Alex  Reports she is feeling well. Wants to go home but still on supplemental O2. No fevers overnight     Objective:   /67   Pulse 79   Temp 97.2 °F (36.2 °C) (Oral)   Resp 16   Ht 5' 3\" (1.6 m)   Wt 203 lb (92.1 kg)   SpO2 94%   BMI 35.96 kg/m²       Intake/Output Summary (Last 24 hours) at 6/6/2019 1105  Last data filed at 6/6/2019 0910  Gross per 24 hour   Intake 2934 ml   Output --   Net 2934 ml       Physical Exam:  Gen: No distress. Alert. Eyes: PERRL. No sclera icterus. No conjunctival injection. ENT: No discharge. Pharynx clear. Neck: No JVD. Trachea midline. Resp: No accessory muscle use. Mild bibasilar crackles. faint wheezes. No rhonchi. CV: Regular rate. Regular rhythm. No murmur. No rub. No edema. Capillary Refill: Brisk,< 3 seconds   Peripheral Pulses: +2 palpable, equal bilaterally   GI: Non-tender. Non-distended. Normal bowel sounds. Skin: Warm and dry. No nodule on exposed extremities. No rash on exposed extremities. M/S: No cyanosis. No joint deformity. No clubbing. Neuro: Awake. Grossly nonfocal    Psych: Oriented x 3. No anxiety or agitation. I Rosario Estrada have reviewed the chart on Rishabh Funes and personally interviewed and examined patient, reviewed the data (labs and imaging) and after discussion with my PA formulated the plan. Agree with note with the following edits. HPI:     I reviewed the patient's Past Medical History, Past Surgical History, Medications, and Allergies. General: eldelry female  Awake, alert and oriented.  Appears to be not in any distress  Mucous Membranes:  Pink , anicteric  Neck: No JVD, no carotid bruit, no thyromegaly  Chest:  resolved wheeze , minimal basilar crackles  Cardiovascular:  RRR S1S2 heard, no murmurs or gallops  Abdomen:  Soft, undistended, non tender, no are seen.  Sunny Bell is borderline concentric left ventricular hypertrophy.   Grade II diastolic dysfunction with elevated filling pressure.   Mild mitral annular calcification.   The right atrium is mildly dilated.   Mild to moderate mitral and tricuspid regurgitation.   Mild pulmonic regurgitation present.   Systolic pulmonary artery pressure (SPAP) estimated at 42mmHg (RA pressure   8mmHg), consistent with mild pulmonary hypertension      CULTURES  Resp Film Array: negative   Resp Cx: pending   Blood Cx: NGTD  Urine Cx: negative  Legionella: Negative     EKG:  I have reviewed the EKG with the following interpretation:   NSR, normal axis, normal interval, no acute ST segment changes concerning for acute ischemia      RADIOLOGY  XR CHEST STANDARD (2 VW)   Final Result   1. Stable mild cardiomegaly. Mild pulmonary vascular congestion. 2. No acute focal airspace consolidation or sizable pleural effusions.              Pertinent previous results reviewed   Ct chest 5/30/19     1. No CT evidence of a pulmonary embolism. 2. No acute abnormality of the thoracic aorta. 3. New scattered nonspecific ground-glass opacity throughout both lungs,   increased from the studies of 03/2019.  Most likely considerations include   either atelectasis or developing asymmetric pulmonary edema. 4. Stable mild chronic interstitial pulmonary fibrosis. 5. Stable mild mediastinal and bilateral hilar lymphadenopathy. 6. Stable cardiomegaly.    7. Diffuse hepatic steatosis.      ASSESSMENT/PLAN:     Acute hypoxic respiratory failure   - does not require home O2   - now on 2L NC O2   - Suspect 2/2 tracheobronchitis and bronchospasm   - Wean as tolerated   - previous PFT neg for any copd   - echo with normal EF, grade II DD  - trial of lasix today for persistent hypoxia     Acute tracheo Bronchitis    - no pna per imaging   - Dx last week, didn't start Abx until yesterday   - Ct chest last week neg  - Fevers, +WBC +producitve cough, hypoxia   - Continue Levaquin, IBD, Steroids   - Supplemental O2 PRN   - Resp viral array negative, Resp Cx pending    - fevers resolved     HTN  - Continue BB     HLD  - Continue      Hypothyroidism   - Continue Synthroid      Depression   - Continue home medications      DVT Prophylaxis: Lovenox   Diet: DIET GENERAL;   Code Status: Full Code    Will work to wean O2 and walking pulse ox.  D/c home when weaned to [id number]  6/6/2019 11:08 AM      Agree with above  Changes made to note    Kiah Raymundo MD 6/6/2019 3:46 PM

## 2019-06-06 NOTE — PROGRESS NOTES
Report given to Tania Springer RN at bedside for transfer of care. Pt denies needs at this time, call light within reach.   Sherren Nettles Pride RN

## 2019-06-06 NOTE — PROGRESS NOTES
Shift assessment completed. Pt is alert and oriented. Vital signs are WNL. Respirations are even & easy. No complaints voiced. Pt denies needs at this time. SR up x 2 and bed in low position. Call light is within reach. Will monitor.

## 2019-06-06 NOTE — PROGRESS NOTES
Pt ambulated in the west on room air. Pt ambulated approximately 60 feet and desat to 83%. Pt placed in wheelchair and taken back to room. Placed on 4L O2 sat up to 93%. Pt continues on 4L at this time, C/O some SOB but denies pain, discomfort, or distress. MD updated, will continue to monitor.   Griffin Davidson RN

## 2019-06-07 LAB
CULTURE, RESPIRATORY: NORMAL
GRAM STAIN RESULT: NORMAL
STREP PNEUMONIAE ANTIGEN, URINE: NORMAL

## 2019-06-07 PROCEDURE — 6370000000 HC RX 637 (ALT 250 FOR IP): Performed by: INTERNAL MEDICINE

## 2019-06-07 PROCEDURE — 1200000000 HC SEMI PRIVATE

## 2019-06-07 PROCEDURE — 6370000000 HC RX 637 (ALT 250 FOR IP): Performed by: HOSPITALIST

## 2019-06-07 PROCEDURE — 6370000000 HC RX 637 (ALT 250 FOR IP): Performed by: PHYSICIAN ASSISTANT

## 2019-06-07 PROCEDURE — 6360000002 HC RX W HCPCS: Performed by: PHYSICIAN ASSISTANT

## 2019-06-07 PROCEDURE — 99238 HOSP IP/OBS DSCHRG MGMT 30/<: CPT | Performed by: INTERNAL MEDICINE

## 2019-06-07 PROCEDURE — 2580000003 HC RX 258: Performed by: PHYSICIAN ASSISTANT

## 2019-06-07 PROCEDURE — 6360000002 HC RX W HCPCS: Performed by: INTERNAL MEDICINE

## 2019-06-07 PROCEDURE — 94761 N-INVAS EAR/PLS OXIMETRY MLT: CPT

## 2019-06-07 PROCEDURE — 94640 AIRWAY INHALATION TREATMENT: CPT

## 2019-06-07 PROCEDURE — 2700000000 HC OXYGEN THERAPY PER DAY

## 2019-06-07 RX ORDER — FUROSEMIDE 20 MG/1
20 TABLET ORAL DAILY
Qty: 30 TABLET | Refills: 0 | Status: ON HOLD | OUTPATIENT
Start: 2019-06-07 | End: 2019-07-14 | Stop reason: HOSPADM

## 2019-06-07 RX ORDER — PREDNISONE 10 MG/1
TABLET ORAL
Qty: 20 TABLET | Refills: 0 | Status: SHIPPED | OUTPATIENT
Start: 2019-06-07 | End: 2019-06-16 | Stop reason: ALTCHOICE

## 2019-06-07 RX ORDER — LEVOFLOXACIN 500 MG/1
500 TABLET, FILM COATED ORAL DAILY
Qty: 4 TABLET | Refills: 0 | Status: SHIPPED | OUTPATIENT
Start: 2019-06-08 | End: 2019-06-12

## 2019-06-07 RX ORDER — FUROSEMIDE 10 MG/ML
20 INJECTION INTRAMUSCULAR; INTRAVENOUS ONCE
Status: COMPLETED | OUTPATIENT
Start: 2019-06-07 | End: 2019-06-07

## 2019-06-07 RX ADMIN — NADOLOL 40 MG: 40 TABLET ORAL at 08:09

## 2019-06-07 RX ADMIN — Medication 10 ML: at 20:08

## 2019-06-07 RX ADMIN — IPRATROPIUM BROMIDE AND ALBUTEROL SULFATE 1 AMPULE: .5; 3 SOLUTION RESPIRATORY (INHALATION) at 15:01

## 2019-06-07 RX ADMIN — BENZONATATE 100 MG: 100 CAPSULE ORAL at 22:23

## 2019-06-07 RX ADMIN — MIRTAZAPINE 30 MG: 30 TABLET, FILM COATED ORAL at 20:08

## 2019-06-07 RX ADMIN — Medication 10 ML: at 08:10

## 2019-06-07 RX ADMIN — FUROSEMIDE 20 MG: 10 INJECTION, SOLUTION INTRAVENOUS at 12:49

## 2019-06-07 RX ADMIN — IPRATROPIUM BROMIDE AND ALBUTEROL SULFATE 1 AMPULE: .5; 3 SOLUTION RESPIRATORY (INHALATION) at 19:52

## 2019-06-07 RX ADMIN — ENOXAPARIN SODIUM 40 MG: 100 INJECTION SUBCUTANEOUS at 08:09

## 2019-06-07 RX ADMIN — AMITRIPTYLINE HYDROCHLORIDE 25 MG: 25 TABLET, FILM COATED ORAL at 20:08

## 2019-06-07 RX ADMIN — ACETAMINOPHEN 650 MG: 325 TABLET ORAL at 04:51

## 2019-06-07 RX ADMIN — GUAIFENESIN 200 MG: 100 SOLUTION ORAL at 04:51

## 2019-06-07 RX ADMIN — ASPIRIN 81 MG 81 MG: 81 TABLET ORAL at 08:09

## 2019-06-07 RX ADMIN — IPRATROPIUM BROMIDE AND ALBUTEROL SULFATE 1 AMPULE: .5; 3 SOLUTION RESPIRATORY (INHALATION) at 11:46

## 2019-06-07 RX ADMIN — LEVOTHYROXINE SODIUM 150 MCG: 150 TABLET ORAL at 05:37

## 2019-06-07 RX ADMIN — PREDNISONE 40 MG: 20 TABLET ORAL at 08:09

## 2019-06-07 RX ADMIN — IPRATROPIUM BROMIDE AND ALBUTEROL SULFATE 1 AMPULE: .5; 3 SOLUTION RESPIRATORY (INHALATION) at 07:31

## 2019-06-07 RX ADMIN — LEVOFLOXACIN 500 MG: 500 TABLET, FILM COATED ORAL at 08:09

## 2019-06-07 RX ADMIN — ACETAMINOPHEN 650 MG: 325 TABLET ORAL at 22:23

## 2019-06-07 RX ADMIN — ACETAMINOPHEN 650 MG: 325 TABLET ORAL at 17:21

## 2019-06-07 ASSESSMENT — PAIN SCALES - GENERAL
PAINLEVEL_OUTOF10: 7
PAINLEVEL_OUTOF10: 1
PAINLEVEL_OUTOF10: 4

## 2019-06-07 NOTE — CARE COORDINATION
INTERDISCIPLINARY PLAN OF CARE CONFERENCE    Date/Time: 6/7/2019 1:46 PM  Completed by: Manny Mena, Case Management      Patient Name:  Miguel Babinski  YOB: 1955  Admitting Diagnosis: Acute respiratory distress [R06.03]  Acute respiratory failure with hypoxia ([address] [J96.01]     Admit Date/Time:  6/4/2019 10:26 AM    Chart reviewed. Interdisciplinary team met to discuss patient progress and discharge plans. Disciplines included Case Management, Nursing, and Dietitian. Current Status: Inpatient    PT/OT recommendation:     Anticipated Discharge Date: TBD  Expected D/C Disposition:  Home  Confirmed plan with patient and/or family Yes  Discharge Plan Comments: Plan: Increased SOB and low O2 sat w/ activity today. CM following for possible Home O2 and HHN.  Pt [facility]) if needs DME on DC. +following    Home O2 in place on admit: No  Pt informed of need to bring portable home O2 tank on day of discharge for nursing to connect prior to leaving:  No  Verbalized agreement/Understanding:  No

## 2019-06-07 NOTE — PROGRESS NOTES
Room air O2 at rest = 88% - 90%  Room air O2 walking = 84%  Room air O2 resting after the walk x 5 minutes = 94%

## 2019-06-07 NOTE — PROGRESS NOTES
Handoff report and transfer of care given at bedside to [facility]. Patient in stable condition, denies needs/concerns at this time. Call light within reach.

## 2019-06-07 NOTE — PROGRESS NOTES
Morning assessment complete with morning medication given. Patient denies any additional needs at this time. Call light within reach.

## 2019-06-07 NOTE — PROGRESS NOTES
Pt resting as manifested by eyes closed in dark room. Respirations even and easy. Call light and bedside table in reach. Bed in low locked position. Denies any needs at this time.

## 2019-06-07 NOTE — PROGRESS NOTES
Shift assessment complete; see flow sheet. Scheduled medications administered; See MAR. New IV placed  O2 3 LPM nc in place. Call light within reach, bed in low locked position, bed alarm on.

## 2019-06-07 NOTE — PROGRESS NOTES
PHARMACY NOTE  Javon Zaldivar was ordered Azelastine-Fluticasone nasal spray. Per the [address], this medication is non-formulary and not stocked by pharmacy. The medication can be reordered at discharge.    JULIANE RandallPh.6/7/20199:41 AM

## 2019-06-08 VITALS
OXYGEN SATURATION: 94 % | HEIGHT: 63 IN | DIASTOLIC BLOOD PRESSURE: 76 MMHG | RESPIRATION RATE: 16 BRPM | WEIGHT: 203 LBS | TEMPERATURE: 97.8 F | HEART RATE: 76 BPM | SYSTOLIC BLOOD PRESSURE: 120 MMHG | BODY MASS INDEX: 35.97 KG/M2

## 2019-06-08 PROCEDURE — 6360000002 HC RX W HCPCS: Performed by: PHYSICIAN ASSISTANT

## 2019-06-08 PROCEDURE — 94640 AIRWAY INHALATION TREATMENT: CPT

## 2019-06-08 PROCEDURE — 2580000003 HC RX 258: Performed by: PHYSICIAN ASSISTANT

## 2019-06-08 PROCEDURE — 6370000000 HC RX 637 (ALT 250 FOR IP): Performed by: HOSPITALIST

## 2019-06-08 PROCEDURE — 6370000000 HC RX 637 (ALT 250 FOR IP): Performed by: INTERNAL MEDICINE

## 2019-06-08 PROCEDURE — 94761 N-INVAS EAR/PLS OXIMETRY MLT: CPT

## 2019-06-08 PROCEDURE — 6370000000 HC RX 637 (ALT 250 FOR IP): Performed by: PHYSICIAN ASSISTANT

## 2019-06-08 RX ORDER — IPRATROPIUM BROMIDE AND ALBUTEROL SULFATE 2.5; .5 MG/3ML; MG/3ML
3 SOLUTION RESPIRATORY (INHALATION) EVERY 6 HOURS PRN
Qty: 360 ML | Refills: 0 | Status: SHIPPED | OUTPATIENT
Start: 2019-06-08 | End: 2020-02-24

## 2019-06-08 RX ADMIN — ASPIRIN 81 MG 81 MG: 81 TABLET ORAL at 08:27

## 2019-06-08 RX ADMIN — PREDNISONE 40 MG: 20 TABLET ORAL at 08:27

## 2019-06-08 RX ADMIN — IPRATROPIUM BROMIDE AND ALBUTEROL SULFATE 1 AMPULE: .5; 3 SOLUTION RESPIRATORY (INHALATION) at 07:17

## 2019-06-08 RX ADMIN — NADOLOL 40 MG: 40 TABLET ORAL at 08:27

## 2019-06-08 RX ADMIN — IPRATROPIUM BROMIDE AND ALBUTEROL SULFATE 1 AMPULE: .5; 3 SOLUTION RESPIRATORY (INHALATION) at 15:04

## 2019-06-08 RX ADMIN — LEVOFLOXACIN 500 MG: 500 TABLET, FILM COATED ORAL at 08:27

## 2019-06-08 RX ADMIN — Medication 10 ML: at 08:27

## 2019-06-08 RX ADMIN — GUAIFENESIN 200 MG: 100 SOLUTION ORAL at 05:16

## 2019-06-08 RX ADMIN — LEVOTHYROXINE SODIUM 150 MCG: 150 TABLET ORAL at 06:04

## 2019-06-08 RX ADMIN — IPRATROPIUM BROMIDE AND ALBUTEROL SULFATE 1 AMPULE: .5; 3 SOLUTION RESPIRATORY (INHALATION) at 11:14

## 2019-06-08 RX ADMIN — ACETAMINOPHEN 650 MG: 325 TABLET ORAL at 05:17

## 2019-06-08 RX ADMIN — ENOXAPARIN SODIUM 40 MG: 100 INJECTION SUBCUTANEOUS at 08:27

## 2019-06-08 ASSESSMENT — PAIN SCALES - GENERAL: PAINLEVEL_OUTOF10: 3

## 2019-06-08 NOTE — DISCHARGE SUMMARY
cooperative. HEENT:  Normal cephalic, atraumatic   Respiratory:  Normal respiratory effort. Clear to auscultation, bilaterally minimal t [place]  Cardiovascular:  Regular rate and rhythm with normal S1/S2 without murmurs, rubs or gallops. Abdomen: Soft, non-tender, non-distended with normal bowel sounds. Musculoskeletal:  No clubbing, cyanosis or edema bilaterally. Full range of motion without deformity. Skin: Skin color, texture, turgor normal.  No rashes or lesions. Neurologic:  Neurovascularly intact without any focal sensory/motor deficits. Cranial nerves: II-XII intact, grossly non-focal.  Psychiatric:  Alert and oriented, thought content appropriate, normal insight        Labs: For convenience and continuity at follow-up the following most recent labs are provided:      CBC:    Lab Results   Component Value Date    WBC 13.4 06/05/2019    HGB 10.8 06/05/2019    HCT 32.7 06/05/2019     06/05/2019       Renal:    Lab Results   Component Value Date     06/05/2019    K 3.7 06/05/2019     06/05/2019    CO2 20 06/05/2019    BUN 14 06/05/2019    CREATININE 0.6 06/05/2019    CALCIUM 8.3 06/05/2019         Significant Diagnostic Studies    Radiology:   XR CHEST STANDARD (2 VW)   Final Result   1. Stable mild cardiomegaly. Mild pulmonary vascular congestion. 2. No acute focal airspace consolidation or sizable pleural effusions.                 Consults:     IP CONSULT TO HOSPITALIST    Disposition:  home     Condition at Discharge: Stable    Discharge Instructions/Follow-up: pcp  pulm    Code Status:  Full Code     Activity: activity as tolerated    Diet: cardiac diet      Discharge Medications:     Current Discharge Medication List           Details   levofloxacin (LEVAQUIN) 500 MG tablet Take 1 tablet by mouth daily for 4 days  Qty: 4 tablet, Refills: 0      predniSONE (DELTASONE) 10 MG tablet 30 mg x 3 days, 20 mg x 3 days, 10 mg x 3 days then stop  Qty: 20 tablet, Refills: 0 furosemide (LASIX) 20 MG tablet Take 1 tablet by mouth daily  Qty: 30 tablet, Refills: 0              Details   !! albuterol sulfate  (90 Base) MCG/ACT inhaler Inhale 2 puffs into the lungs 4 times daily as needed for Wheezing  Qty: 1 Inhaler, Refills: 0      !! albuterol sulfate  (90 Base) MCG/ACT inhaler Inhale 2 puffs into the lungs 4 times daily as needed for Wheezing  Qty: 1 Inhaler, Refills: 0      Azelastine-Fluticasone 137-50 MCG/ACT SUSP 1 spray by Nasal route daily  Qty: 1 Bottle, Refills: 5      levothyroxine (SYNTHROID) 150 MCG tablet TAKE 1 TABLET BY MOUTH EVERY DAY  Qty: 30 tablet, Refills: 3    Associated Diagnoses: Acquired hypothyroidism      nadolol (CORGARD) 20 MG tablet TAKE 2 TABLETS BY MOUTH EVERY DAY  Qty: 60 tablet, Refills: 5    Associated Diagnoses: Essential hypertension      amitriptyline (ELAVIL) 25 MG tablet Take 1 tablet by mouth nightly  Qty: 30 tablet, Refills: 5    Associated Diagnoses: Migraine without aura and without status migrainosus, not intractable      traZODone (DESYREL) 50 MG tablet TAKE 1 TABLET BY MOUTH NIGHTLY  Qty: 30 tablet, Refills: 5    Associated Diagnoses: Psychophysiological insomnia      ketorolac (TORADOL) 10 MG tablet Take 1 tablet by mouth every 6 hours as needed for Pain (migraines)  Qty: 30 tablet, Refills: 5    Associated Diagnoses: Migraine without aura and without status migrainosus, not intractable      sertraline (ZOLOFT) 100 MG tablet Take 2 tablets by mouth daily  Qty: 60 tablet, Refills: 5    Associated Diagnoses: Mild single current episode of major depressive disorder ([facility] [address] Anxiety      mirtazapine (REMERON) 30 MG tablet TAKE 1 TABLET BY MOUTH EVERY DAY AT NIGHT  Qty: 30 tablet, Refills: 0    Associated Diagnoses: Anxiety      acetaminophen (TYLENOL) 500 MG tablet Take 1,000 mg by mouth      aspirin 81 MG chewable tablet Take 1 tablet by mouth daily. Qty: 30 tablet, Refills: 0       !! - Potential duplicate medications found. Please discuss with provider. Time Spent on discharge is more than 45 minutes in the examination, evaluation, counseling and review of medications and discharge plan. Signed:    Edward Shelby MD   6/8/2019      Thank you Chaka Villa MD for the opportunity to be involved in this patient's care. If you have any questions or concerns please feel free to contact me at [phone].

## 2019-06-08 NOTE — CARE COORDINATION
DISCHARGE ORDER  Date/Time 2019 2:30 PM  Completed by: Lady Meeks, Case Management    Patient Name: Angel Rowe    : 1955  Admitting Diagnosis: Acute respiratory distress [R06.03]  Acute respiratory failure with hypoxia ([address] [J96.01]  Admit Date/Time: 2019 10:26 AM    Noted discharge order. Confirmed discharge plan with patient Yes   Discharge Plan: Order for dc noted. Spoke with pt who cont plan to return home. Discussed HHC and pt declined. Discussed need for home O2 and pt agreeable and chooses [facility]. Spoke with "[employer] at [facility] re: referral for new home O2 and HHN and faxed all requested info. Await return call that has received needed info.  Chart reviewed and no other dc needs identified,    Spoke with "[employer] at Currently who states has recieved all needed info and  is on way to deliver portable tanks and will further inst pt when arrives

## 2019-06-08 NOTE — PROGRESS NOTES
IM Progress Note    Admit Date:  6/4/2019  3    Interval history:  Remains on oxygen   No fevers overnight     Subjective:  Ms. Ant Cabrera  Reports she is feeling well. Off oxygen on RA but desats with ambulation  Tolerated lasix well     Objective:   /67   Pulse 73   Temp 97.9 °F (36.6 °C) (Oral)   Resp 18   Ht 5' 3\" (1.6 m)   Wt 203 lb (92.1 kg)   SpO2 91%   BMI 35.96 kg/m²       Intake/Output Summary (Last 24 hours) at 6/7/2019 2014  Last data filed at 6/7/2019 1755  Gross per 24 hour   Intake 600 ml   Output --   Net 600 ml       Physical Exam:          General: eldelry female  Awake, alert and oriented. Appears to be not in any distress  Mucous Membranes:  Pink , anicteric  Neck: No JVD, no carotid bruit, no thyromegaly  Chest:  resolved wheeze , minimal basilar crackles  Cardiovascular:  RRR S1S2 heard, no murmurs or gallops  Abdomen:  Soft, undistended, non tender, no organomegaly, BS present  Extremities: No edema or cyanosis. Distal pulses well felt  Neurological : grossly normal      Medications:   Scheduled Medications:    levofloxacin  500 mg Oral Daily    amitriptyline  25 mg Oral Nightly    aspirin  81 mg Oral Daily    levothyroxine  150 mcg Oral Daily    mirtazapine  30 mg Oral Nightly    nadolol  40 mg Oral Daily    sodium chloride flush  10 mL Intravenous 2 times per day    enoxaparin  40 mg Subcutaneous Daily    ipratropium-albuterol  1 ampule Inhalation 4x daily    predniSONE  40 mg Oral Daily     I    guaiFENesin, albuterol sulfate HFA, benzonatate, sodium chloride flush, magnesium hydroxide, ondansetron, acetaminophen    Lab Data:  Recent Labs     06/05/19  0641   WBC 13.4*   HGB 10.8*   HCT 32.7*   MCV 85.6        Recent Labs     06/05/19  0641   *   K 3.7      CO2 20*   BUN 14   CREATININE 0.6     No results for input(s): CKTOTAL, CKMB, CKMBINDEX, TROPONINI in the last 72 hours.     Coagulation:   Lab Results   Component Value Date    INR 1.05 06/16/2014 APTT 25.7 06/16/2014     Cardiac markers:   Lab Results   Component Value Date    TROPONINI <0.01 06/04/2019         Lab Results   Component Value Date    ALT 11 06/04/2019    AST 33 06/04/2019    ALKPHOS 122 06/04/2019    BILITOT 0.5 06/04/2019       Lab Results   Component Value Date    INR 1.05 06/16/2014    PROTIME 11.4 06/16/2014       Radiology    XR CHEST STANDARD (2 VW)   Final Result   1. Stable mild cardiomegaly. Mild pulmonary vascular congestion. 2. No acute focal airspace consolidation or sizable pleural effusions. ECHO    Summary   Normal LV systolic function with an estimated EF of 55-60%.   No regional wall motion abnormalities are seen.  Gradie Bran is borderline concentric left ventricular hypertrophy.   Grade II diastolic dysfunction with elevated filling pressure.   Mild mitral annular calcification.   The right atrium is mildly dilated.   Mild to moderate mitral and tricuspid regurgitation.   Mild pulmonic regurgitation present.   Systolic pulmonary artery pressure (SPAP) estimated at 42mmHg (RA pressure   8mmHg), consistent with mild pulmonary hypertension      CULTURES  Resp Film Array: negative   Resp Cx: pending   Blood Cx: NGTD  Urine Cx: negative  Legionella: Negative     EKG:  I have reviewed the EKG with the following interpretation:   NSR, normal axis, normal interval, no acute ST segment changes concerning for acute ischemia      RADIOLOGY  XR CHEST STANDARD (2 VW)   Final Result   1. Stable mild cardiomegaly. Mild pulmonary vascular congestion. 2. No acute focal airspace consolidation or sizable pleural effusions.              Pertinent previous results reviewed   Ct chest 5/30/19     1. No CT evidence of a pulmonary embolism. 2. No acute abnormality of the thoracic aorta.    3. New scattered nonspecific ground-glass opacity throughout both lungs,   increased from the studies of 03/2019.  Most likely considerations include   either atelectasis or developing asymmetric pulmonary edema. 4. Stable mild chronic interstitial pulmonary fibrosis. 5. Stable mild mediastinal and bilateral hilar lymphadenopathy. 6. Stable cardiomegaly. 7. Diffuse hepatic steatosis.      ASSESSMENT/PLAN:     Acute hypoxic respiratory failure   - does not require home O2   - now on 2L NC O2   - Suspect 2/2 tracheobronchitis and bronchospasm   - Wean as tolerated   - previous PFT neg for any copd   - echo with normal EF, grade II DD  - repeat  lasix today for persistent hypoxia     Acute tracheo Bronchitis    - no pna per imaging   - Dx last week, didn't start Abx until yesterday   - Ct chest last week neg  - Fevers, +WBC +producitve cough, hypoxia   - Continue Levaquin, IBD, Steroids   - Supplemental O2 PRN   - Resp viral array negative, Resp Cx pending    - fevers resolved     HTN  - Continue BB     HLD  - Continue      Hypothyroidism   - Continue Synthroid      Depression   - Continue home medications      DVT Prophylaxis: Lovenox   Diet: DIET GENERAL;   Code Status: Full Code    Will work to wean O2 and walking pulse ox.  D/c home when weaned to 85 Thine Cosme MD 6/7/2019 8:14 PM

## 2019-06-08 NOTE — PROGRESS NOTES
Pt needs  o2 at home   CT shows  MildIPF/  Pt has  Diastolic chf  o2 desat with ambulation to 83  Romulo Sunshine

## 2019-06-08 NOTE — DISCHARGE INSTR - COC
Continuity of Care Form    Patient Name: Peterson Alberto   :  1955  MRN:  [id number]    Admit date:  2019  Discharge date:  19    Code Status Order: Full Code   Advance Directives:   [facility] Documentation     Date/Time Healthcare Directive Type of Healthcare Directive Copy in [facility] [address] Agent's Name Healthcare Agent's Phone Number    19 1612  No, patient does not have an advance directive for healthcare treatment -- -- -- -- --          Admitting Physician:  Kadie Hudson MD  PCP: Gia Nicole MD    Discharging Nurse:  Thien David Unit/Room#: 0227/0227-01  Discharging Unit Phone Number: [phone]    Emergency Contact:   Extended Emergency Contact Information  Primary Emergency Contact: Makeda Parry, [address] Phone: [phone]  Relation: Child  Secondary Emergency Contact: Terrance AGUILA  Address: [address] [place] [address] Phone: [phone]  Relation: Spouse    Past Surgical History:  Past Surgical History:   Procedure Laterality Date    CHOLECYSTECTOMY      HYSTERECTOMY, TOTAL ABDOMINAL         Immunization History:   Immunization History   Administered Date(s) Administered    Influenza Virus Vaccine 01/15/2016, 2016    Pneumococcal Polysaccharide ([id number]) 2013, 2014       Active Problems:  Patient Active Problem List   Diagnosis Code    Chest pain R07.9    HTN (hypertension) I10    Hyperlipidemia with target LDL less than 130 E78.5    Depression F32.9    Hypokalemia E87.6    Insomnia G47.00    Trochanteric bursitis of both hips M70.61, M70.62    Migraine G43.909    Syncope R55    Gastrointestinal hemorrhage associated with gastric ulcer K25.4    Acute blood loss anemia D62    Fatigue R53.83    Acquired hypothyroidism E03.9    Mild single current episode of major depressive disorder (HCC) F32.0    Anxiety F41.9    Acute respiratory distress R06.03    Acute respiratory failure with hypoxia (HCC) J96.01    Bronchitis J40    Bronchospasm J98.01    Hypoxia R09.02       Isolation/Infection:   Isolation          No Isolation            Nurse Assessment:  Last Vital Signs: /76   Pulse 76   Temp 97.8 °F (36.6 °C) (Oral)   Resp 16   Ht 5' 3\" (1.6 m)   Wt 203 lb (92.1 kg)   SpO2 92%   BMI 35.96 kg/m²     Last documented pain score (0-10 scale): Pain Level: 3  Last Weight:   Wt Readings from Last 1 Encounters:   06/04/19 203 lb (92.1 kg)     Mental Status:  oriented and alert    IV Access:  - None    Nursing Mobility/ADLs:  Walking   Independent  Transfer  Independent  Bathing  Independent  Dressing  Independent  Toileting  Independent  Feeding  Independent  Med Admin  Independent  Med Delivery   whole    Wound Care Documentation and Therapy:        Elimination:  Continence:   · Bowel: Yes  · Bladder: Yes  Urinary Catheter: None   Colostomy/Ileostomy/Ileal Conduit: No       Date of Last BM:     Intake/Output Summary (Last 24 hours) at 6/8/2019 1412  Last data filed at 6/8/2019 1319  Gross per 24 hour   Intake 680 ml   Output --   Net 680 ml     I/O last 3 completed shifts: In: 600 [P.O.:600]  Out: -     Safety Concerns:     Oxygen level dropping on exertion    Impairments/Disabilities:      None    Nutrition Therapy:  Current Nutrition Therapy:   - Oral Diet:  General    Routes of Feeding: Oral  Liquids: Thin Liquids  Daily Fluid Restriction: no  Last Modified Barium Swallow with Video (Video Swallowing Test): not done    Treatments at the Time of Hospital Discharge:   Respiratory Treatments: Nebulizer treatments  Oxygen Therapy:  is on oxygen at 2 L/min per nasal cannula. Ventilator:    - No ventilator support    Rehab Therapies:   Weight Bearing Status/Restrictions: No weight bearing restirctions  Other Medical Equipment (for information only, NOT a DME order):     Other Treatments:     Patient's personal belongings

## 2019-06-08 NOTE — PLAN OF CARE
Fever  Bronchitis  Hypoxia    Patient has seen pulm as OP, see their clinic note from 3/20/19    Nico Longo PA-C  6/4/2019 1:53 PM
Problem: Falls - Risk of:  Goal: Will remain free from falls  Description  Will remain free from falls  6/7/2019 0210 by Shanelle Izaguirre RN  Outcome: Ongoing  6/6/2019 1345 by Kali Brown RN  Outcome: Met This Shift  Goal: Absence of physical injury  Description  Absence of physical injury  6/7/2019 0210 by Shanelle Izaguirre RN  Outcome: Ongoing  6/6/2019 1345 by Kali Brown RN  Outcome: Met This Shift     Problem: Infection:  Goal: Will remain free from infection  Description  Will remain free from infection  6/7/2019 0210 by Shanelle Izaguirre RN  Outcome: Ongoing  6/6/2019 1345 by Kali Brown RN  Outcome: Met This Shift     Problem: Safety:  Goal: Free from accidental physical injury  Description  Free from accidental physical injury  6/7/2019 0210 by Shanelle Izaguirre RN  Outcome: Ongoing  6/6/2019 1345 by Kali Brown RN  Outcome: Met This Shift     Problem: Daily Care:  Goal: Daily care needs are met  Description  Daily care needs are met  6/7/2019 0210 by Shanelle Izaguirre RN  Outcome: Ongoing  6/6/2019 1345 by Kali Brown RN  Outcome: Met This Shift     Problem: Pain:  Description  Pain management should include both nonpharmacologic and pharmacologic interventions.   Goal: Patient's pain/discomfort is manageable  Description  Patient's pain/discomfort is manageable  6/7/2019 0210 by Shanelle Izaguirre RN  Outcome: Ongoing  6/6/2019 1345 by Kali Brown RN  Outcome: Met This Shift  Goal: Pain level will decrease  Description  Pain level will decrease  6/7/2019 0210 by Shanelle Izaguirre RN  Outcome: Ongoing  6/6/2019 1345 by Kali Brown RN  Outcome: Met This Shift  Goal: Control of acute pain  Description  Control of acute pain  6/7/2019 0210 by Shanelle Izaguirre RN  Outcome: Ongoing  6/6/2019 1345 by Kali Brown RN  Outcome: Met This Shift  Goal: Control of chronic pain  Description  Control of chronic pain  6/7/2019 0210 by Shanelle Izaguirre RN  Outcome: Ongoing  6/6/2019
Problem: Falls - Risk of:  Goal: Will remain free from falls  Description  Will remain free from falls  6/7/2019 1318 by Patricio Norman RN  Outcome: Ongoing     Problem: Infection:  Goal: Will remain free from infection  Description  Will remain free from infection  6/7/2019 1318 by Patricio Norman RN  Outcome: Ongoing     Problem: Safety:  Goal: Free from accidental physical injury  Description  Free from accidental physical injury  6/7/2019 1318 by Patricio Norman RN  Outcome: Ongoing     Problem: Pain:  Goal: Pain level will decrease  Description  Pain level will decrease  6/7/2019 1318 by Patricio Norman RN  Outcome: Ongoing     Problem: Skin Integrity:  Goal: Skin integrity will stabilize  Description  Skin integrity will stabilize  6/7/2019 1318 by Patricio Norman RN  Outcome: Ongoing
Problem: Falls - Risk of:  Goal: Will remain free from falls  Description  Will remain free from falls  6/8/2019 0140 by Celeste Londono RN  Outcome: Ongoing  6/7/2019 1318 by Madison Jaimes RN  Outcome: Ongoing  Goal: Absence of physical injury  Description  Absence of physical injury  Outcome: Ongoing     Problem: Infection:  Goal: Will remain free from infection  Description  Will remain free from infection  6/8/2019 0140 by Celeste Londono RN  Outcome: Ongoing  6/7/2019 1318 by [place] Jaimes RN  Outcome: Ongoing     Problem: Safety:  Goal: Free from accidental physical injury  Description  Free from accidental physical injury  6/8/2019 0140 by Celeste Londono RN  Outcome: Ongoing  6/7/2019 1318 by [place] Jaimes RN  Outcome: Ongoing     Problem: Daily Care:  Goal: Daily care needs are met  Description  Daily care needs are met  Outcome: Ongoing     Problem: Pain:  Description  Pain management should include both nonpharmacologic and pharmacologic interventions.   Goal: Patient's pain/discomfort is manageable  Description  Patient's pain/discomfort is manageable  Outcome: Ongoing  Goal: Pain level will decrease  Description  Pain level will decrease  6/8/2019 0140 by Celeste Londono RN  Outcome: Ongoing  6/7/2019 1318 by [place] Jaimes RN  Outcome: Ongoing  Goal: Control of acute pain  Description  Control of acute pain  Outcome: Ongoing  Goal: Control of chronic pain  Description  Control of chronic pain  Outcome: Ongoing     Problem: Skin Integrity:  Goal: Skin integrity will stabilize  Description  Skin integrity will stabilize  6/8/2019 0140 by Celeste Londono RN  Outcome: Ongoing  6/7/2019 1318 by [place] Jaimes RN  Outcome: Ongoing     Problem: Discharge Planning:  Goal: Patients continuum of care needs are met  Description  Patients continuum of care needs are met  Outcome: Ongoing     Problem: ABCDS Injury Assessment  Goal: Absence of physical injury  Outcome: Ongoing
Problem: Falls - Risk of:  Goal: Will remain free from falls  Description  Will remain free from falls  6/8/2019 0950 by Santhosh Jon RN  Outcome: Ongoing     Problem: Safety:  Goal: Free from accidental physical injury  Description  Free from accidental physical injury  6/8/2019 0950 by Santhosh Jon RN  Outcome: Ongoing     Problem: Pain:  Goal: Patient's pain/discomfort is manageable  Description  Patient's pain/discomfort is manageable  6/8/2019 0950 by Santhosh Jon RN  Outcome: Ongoing     Problem: Skin Integrity:  Goal: Skin integrity will stabilize  Description  Skin integrity will stabilize  6/8/2019 0950 by Santhosh Jon RN  Outcome: Ongoing
Problem: Falls - Risk of:  Goal: Will remain free from falls  Description  Will remain free from falls  Outcome: Ongoing  Goal: Absence of physical injury  Description  Absence of physical injury  Outcome: Ongoing     Problem: Infection:  Goal: Will remain free from infection  Description  Will remain free from infection  Outcome: Ongoing     Problem: Safety:  Goal: Free from accidental physical injury  Description  Free from accidental physical injury  Outcome: Ongoing  Goal: Free from intentional harm  Description  Free from intentional harm  Outcome: Ongoing     Problem: Daily Care:  Goal: Daily care needs are met  Description  Daily care needs are met  Outcome: Ongoing     Problem: Pain:  Goal: Patient's pain/discomfort is manageable  Description  Patient's pain/discomfort is manageable  Outcome: Ongoing     Problem: Pain:  Goal: Patient's pain/discomfort is manageable  Description  Patient's pain/discomfort is manageable  Outcome: Ongoing     Problem: Daily Care:  Goal: Daily care needs are met  Description  Daily care needs are met  Outcome: Ongoing     Problem: Discharge Planning:  Goal: Patients continuum of care needs are met  Description  Patients continuum of care needs are met  Outcome: Ongoing
Ray Davidson RN
integrity will stabilize  Description  Skin integrity will stabilize  6/5/2019 2059 by Chepe Foy RN  Outcome: Ongoing  6/5/2019 1345 by Merari Holbrook RN  Outcome: Met This Shift  Note:   No new s/s of skin breakdown noted. Pt turns self and encouraged to do so every 2 hours and prn.       Problem: Discharge Planning:  Goal: Patients continuum of care needs are met  Description  Patients continuum of care needs are met  Outcome: Ongoing     Problem: ABCDS Injury Assessment  Goal: Absence of physical injury  6/5/2019 2059 by Chepe Foy RN  Outcome: Ongoing  6/5/2019 1345 by Merari Holbrook RN  Outcome: Met This Shift

## 2019-06-08 NOTE — PROGRESS NOTES
Pt reporting coughing spells and headache. Requesting Tessalon pills and tylenol. Given See MAR. Call light within reach. Bed in low locked position. Denies any further needs at this time.

## 2019-06-08 NOTE — PROGRESS NOTES
Shift assessment complete; see flow sheet. Scheduled medications administered; See MAR. A/O x4. Family in room. Pt denies any needs at this time. Call light within reach, bed in low locked position, bed alarm on.

## 2019-06-08 NOTE — PROGRESS NOTES
02 delivered and pt given instructions and denies questions. Pt left in stable condtion in  wheelchair per transport.  Pt discharged home

## 2019-06-08 NOTE — PROGRESS NOTES
O2 level on room air resting - 91%  O2 level on room air walking - 83%  O2 level on room air resting after walking x 5 minute - 91%

## 2019-06-08 NOTE — PROGRESS NOTES
Bedside report received from [facility], no signs of distress noted. Pt denies needs. Call light within reach. Will cont to monitor.

## 2019-06-10 LAB
BLOOD CULTURE, ROUTINE: NORMAL
CULTURE, BLOOD 2: NORMAL

## 2019-06-11 ENCOUNTER — OFFICE VISIT (OUTPATIENT)
Dept: FAMILY MEDICINE CLINIC | Age: 64
End: 2019-06-11
Payer: COMMERCIAL

## 2019-06-11 VITALS
WEIGHT: 211.4 LBS | OXYGEN SATURATION: 93 % | HEART RATE: 87 BPM | DIASTOLIC BLOOD PRESSURE: 68 MMHG | SYSTOLIC BLOOD PRESSURE: 124 MMHG | TEMPERATURE: 96.3 F | BODY MASS INDEX: 37.45 KG/M2

## 2019-06-11 DIAGNOSIS — I10 ESSENTIAL HYPERTENSION: ICD-10-CM

## 2019-06-11 DIAGNOSIS — Z09 HOSPITAL DISCHARGE FOLLOW-UP: ICD-10-CM

## 2019-06-11 DIAGNOSIS — R50.9 FEVER, UNSPECIFIED FEVER CAUSE: ICD-10-CM

## 2019-06-11 DIAGNOSIS — J96.01 ACUTE RESPIRATORY FAILURE WITH HYPOXIA (HCC): Primary | ICD-10-CM

## 2019-06-11 DIAGNOSIS — J40 BRONCHITIS: ICD-10-CM

## 2019-06-11 PROCEDURE — 1111F DSCHRG MED/CURRENT MED MERGE: CPT | Performed by: FAMILY MEDICINE

## 2019-06-11 PROCEDURE — 1090F PRES/ABSN URINE INCON ASSESS: CPT | Performed by: FAMILY MEDICINE

## 2019-06-11 PROCEDURE — 1036F TOBACCO NON-USER: CPT | Performed by: FAMILY MEDICINE

## 2019-06-11 PROCEDURE — 3017F COLORECTAL CA SCREEN DOC REV: CPT | Performed by: FAMILY MEDICINE

## 2019-06-11 PROCEDURE — G8417 CALC BMI ABV UP PARAM F/U: HCPCS | Performed by: FAMILY MEDICINE

## 2019-06-11 PROCEDURE — 99215 OFFICE O/P EST HI 40 MIN: CPT | Performed by: FAMILY MEDICINE

## 2019-06-11 PROCEDURE — 1123F ACP DISCUSS/DSCN MKR DOCD: CPT | Performed by: FAMILY MEDICINE

## 2019-06-11 PROCEDURE — G8427 DOCREV CUR MEDS BY ELIG CLIN: HCPCS | Performed by: FAMILY MEDICINE

## 2019-06-11 PROCEDURE — 4040F PNEUMOC VAC/ADMIN/RCVD: CPT | Performed by: FAMILY MEDICINE

## 2019-06-11 PROCEDURE — G8400 PT W/DXA NO RESULTS DOC: HCPCS | Performed by: FAMILY MEDICINE

## 2019-06-11 RX ORDER — POTASSIUM CHLORIDE 750 MG/1
10 TABLET, EXTENDED RELEASE ORAL DAILY
Qty: 30 TABLET | Refills: 0 | Status: SHIPPED | OUTPATIENT
Start: 2019-06-11 | End: 2019-07-10 | Stop reason: SDUPTHER

## 2019-06-11 ASSESSMENT — PATIENT HEALTH QUESTIONNAIRE - PHQ9
SUM OF ALL RESPONSES TO PHQ QUESTIONS 1-9: 0
SUM OF ALL RESPONSES TO PHQ QUESTIONS 1-9: 0
SUM OF ALL RESPONSES TO PHQ9 QUESTIONS 1 & 2: 0
1. LITTLE INTEREST OR PLEASURE IN DOING THINGS: 0
2. FEELING DOWN, DEPRESSED OR HOPELESS: 0

## 2019-06-11 NOTE — PROGRESS NOTES
Chief Complaint:  The patient is here for a follow up from bronchitis. She was seen as an ED visit on 6-4-19 and was admitted to the hospital.     The patients states that she has had some improvement since her hospital stay. She is concerned about a bruise on her abdomen. HPI:  The patient used her nebulizer today. The patient used her albuterol once today. The patient has continuous oxygen at level 2. The patient states that she had an injection in her abdomen at her hospital stay. S:    General:  Negative fever, negative chills    Resp: positive cough, positive SOB on exertion. Cardiac: negative chest pain    Skin: positive edema in ankles,  Positive abdominal bruise    O:    General:  Alert and [place] x 3    Resp: clear to auscultation bilaterally, no wheezing heard.   Patient can talk without SOB      Cardiac: S1 S2, RRR    Skin:  2+ pitting edema in right and left ankle    Skin:  Ecchymosis seen on right lower abdomen, no active bleeding seen    Assessment:  Bronchitis  Lower Extremity edema  Eccyhmosis    Plan:  Bronchitis: continue prednisone   Continue albuterol   Continue: O2 as needed   Continue levofloxavin   Begin K supplement    Lower extremity edema:  Continue furosemide    Ecchymosis:  Patient educated that the lesion should resolve on its own without intervention    Follow up in one month

## 2019-06-11 NOTE — PROGRESS NOTES
Post-Discharge Transitional Care Management Services or Hospital Follow Up      Bel Lucas   YOB: 1955    Date of Office Visit:  6/11/2019  Date of Hospital Admission: 6/4/19  Date of Hospital Discharge: 6/8/19  Risk of hospital readmission (high >=14%.  Medium >=10%) :Readmission Risk Score: 10      Care management risk score Rising risk (score 2-5) and Complex Care (Scores >=6): 2     Non face to face  following discharge, date last encounter closed (first attempt may have been earlier): *No documented post hospital discharge outreach found in the last 14 days    Call initiated 2 business days of discharge: *No response recorded in the last 14 days    Patient Active Problem List   Diagnosis    Chest pain    HTN (hypertension)    Hyperlipidemia with target LDL less than 130    Depression    Hypokalemia    Insomnia    Trochanteric bursitis of both hips    Migraine    Syncope    Gastrointestinal hemorrhage associated with gastric ulcer    Acute blood loss anemia    Fatigue    Acquired hypothyroidism    Mild single current episode of major depressive disorder (HCC)    Anxiety    Acute respiratory distress    Acute respiratory failure with hypoxia (HCC)    Bronchitis    Bronchospasm    Hypoxia       Allergies   Allergen Reactions    Penicillins Anaphylaxis    Cefuroxime     Doxycycline Hives    Imitrex [Sumatriptan] Other (See Comments)     Feels like pins and needles    Meperidine     Sulfa Antibiotics Hives    Bactrim [Sulfamethoxazole-Trimethoprim] Rash    Keflex [Cephalexin] Itching and Rash    Tape [Adhesive Tape] Rash       Medications listed as ordered at the time of discharge from hospital   [facility] Medication Instructions MARKOS:    Printed on:06/11/19 7786   Medication Information                      acetaminophen (TYLENOL) 500 MG tablet  Take 1,000 mg by mouth             albuterol sulfate  (90 Base) MCG/ACT inhaler  Inhale 2 puffs into the lungs 4 times daily as needed for Wheezing             albuterol sulfate  (90 Base) MCG/ACT inhaler  Inhale 2 puffs into the lungs 4 times daily as needed for Wheezing             amitriptyline (ELAVIL) 25 MG tablet  Take 1 tablet by mouth nightly             aspirin 81 MG chewable tablet  Take 1 tablet by mouth daily.              Azelastine-Fluticasone 137-50 MCG/ACT SUSP  1 spray by Nasal route daily             furosemide (LASIX) 20 MG tablet  Take 1 tablet by mouth daily             ipratropium-albuterol (DUONEB) 0.5-2.5 (3) MG/3ML SOLN nebulizer solution  Inhale 3 mLs into the lungs every 6 hours as needed for Shortness of Breath             ketorolac (TORADOL) 10 MG tablet  Take 1 tablet by mouth every 6 hours as needed for Pain (migraines)             levofloxacin (LEVAQUIN) 500 MG tablet  Take 1 tablet by mouth daily for 4 days             levothyroxine (SYNTHROID) 150 MCG tablet  TAKE 1 TABLET BY MOUTH EVERY DAY             mirtazapine (REMERON) 30 MG tablet  TAKE 1 TABLET BY MOUTH EVERY DAY AT NIGHT             nadolol (CORGARD) 20 MG tablet  TAKE 2 TABLETS BY MOUTH EVERY DAY             potassium chloride (KLOR-CON M) 10 MEQ extended release tablet  Take 1 tablet by mouth daily             predniSONE (DELTASONE) 10 MG tablet  30 mg x 3 days, 20 mg x 3 days, 10 mg x 3 days then stop             sertraline (ZOLOFT) 100 MG tablet  Take 2 tablets by mouth daily             traZODone (DESYREL) 50 MG tablet  TAKE 1 TABLET BY MOUTH NIGHTLY                   Medications marked \"taking\" at this time  Outpatient Medications Marked as Taking for the 6/11/19 encounter (Office Visit) with Aby Cox MD   Medication Sig Dispense Refill    potassium chloride (KLOR-CON M) 10 MEQ extended release tablet Take 1 tablet by mouth daily 30 tablet 0    ipratropium-albuterol (DUONEB) 0.5-2.5 (3) MG/3ML SOLN nebulizer solution Inhale 3 mLs into the lungs every 6 hours as needed for Shortness of Breath 360 mL 0 breath which she uses either her albuterol  MDI, or DuoNeb nebulizer. A comprehensive review of systems was negative except for what was noted in the HPI. Vitals:    06/11/19 1402   BP: 124/68   Site: Left Upper Arm   Position: Sitting   Cuff Size: Large Adult   Pulse: 87   Temp: 96.3 °F (35.7 °C)   TempSrc: Temporal   SpO2: 93%   Weight: 211 lb 6.4 oz (95.9 kg)     Body mass index is 37.45 kg/m². Wt Readings from Last 3 Encounters:   06/11/19 211 lb 6.4 oz (95.9 kg)   06/04/19 203 lb (92.1 kg)   05/30/19 202 lb (91.6 kg)     BP Readings from Last 3 Encounters:   06/11/19 124/68   06/08/19 120/76   05/30/19 112/76        Physical Exam:  General Appearance: alert and oriented to person, place and time, well developed and well- nourished, in no acute distress  Skin: warm and dry, no rash or erythema  Head: normocephalic and atraumatic  Eyes: pupils equal, round, and reactive to light, extraocular eye movements intact, conjunctivae normal  ENT: tympanic membrane, external ear and ear canal normal bilaterally, nose without deformity, nasal mucosa and turbinates normal without polyps  Neck: supple and non-tender without mass, no thyromegaly or thyroid nodules, no cervical lymphadenopathy  Pulmonary/Chest: clear to auscultation bilaterally- no wheezes, rales or rhonchi, normal air movement, no respiratory distress  Cardiovascular: normal rate, regular rhythm, normal S1 and S2, no murmurs, rubs, clicks, or gallops, distal pulses intact, no carotid bruits  Abdomen: soft, non-tender, non-distended, normal bowel sounds, no masses or organomegaly  Extremities: no cyanosis, clubbing or edema  Musculoskeletal: normal range of motion, no joint swelling, deformity or tenderness  Neurologic: reflexes normal and symmetric, no cranial nerve deficit, gait, coordination and speech normal    Assessment/Plan:  1. Acute respiratory failure with hypoxia (HCC)  reolved    2. Bronchitis  resolved    3.  Fever, unspecified fever cause  resolved    4. Essential hypertension  controlled    5. Hospital discharge follow-up          Medical Decision Making: moderate complexity    Continue oxygen until pulse ox remains above 92%. Follow-up with pulmonology next month. Greater than 50% of this 40-minute visit spent in counseling on acute respiratory failure, hypoxia, bronchitis, home oxygen.

## 2019-06-11 NOTE — PATIENT INSTRUCTIONS
Patient Education        Learning About Nebulizers  What is a nebulizer? A nebulizer is a tool that delivers liquid medicine as a fine mist. You breathe in the medicine through a mouthpiece or face mask. This sends the medicine directly to your airways and lungs. You breathe in the medicine for a few minutes. What is it used for? A nebulizer may be used to treat respiratory problems. These include asthma and chronic obstructive pulmonary disease (COPD). If you have asthma, it can be used with daily controller medicines or with quick-relief medicine during an attack or flare-up. A nebulizer can make inhaling medicines easier. It may be very helpful if it is hard for you to breathe or use an inhaler. How do you use a nebulizer? 1. Put the medicine into the medicine container. Be sure to measure the right amount. 2. Make sure that the container is connected to the mouthpiece or face mask. 3. Turn on the air compressor. 4. Take deep, slow breaths through the mouthpiece or mask. Hold each breath for about 2 seconds. 5. Continue breathing until the medicine is gone from the container. When the medicine is gone, there will be no more mist coming out. This may take about 10 minutes. 6. Shake the container to make sure you get all the medicine. Where can you learn more? Go to [email or web address]://[employer]. org and sign in to your [employer] account. Enter A719 in the Gogo box to learn more about \"Learning About Nebulizers. \"     If you do not have an account, please click on the \"Sign Up Now\" link. Current as of: September 5, 2018  Content Version: 12.0  © 4698-1180 Healthwise, Incorporated. Care instructions adapted under license by [facility] ([facility]). If you have questions about a medical condition or this instruction, always ask your healthcare professional. [address] any warranty or liability for your use of this information.

## 2019-06-14 DIAGNOSIS — F41.9 ANXIETY: ICD-10-CM

## 2019-06-14 RX ORDER — MIRTAZAPINE 30 MG/1
TABLET, FILM COATED ORAL
Qty: 30 TABLET | Refills: 0 | Status: SHIPPED | OUTPATIENT
Start: 2019-06-14 | End: 2019-09-09 | Stop reason: SDUPTHER

## 2019-06-16 ENCOUNTER — APPOINTMENT (OUTPATIENT)
Dept: CT IMAGING | Age: 64
DRG: 871 | End: 2019-06-16
Payer: COMMERCIAL

## 2019-06-16 ENCOUNTER — APPOINTMENT (OUTPATIENT)
Dept: GENERAL RADIOLOGY | Age: 64
DRG: 871 | End: 2019-06-16
Payer: COMMERCIAL

## 2019-06-16 ENCOUNTER — HOSPITAL ENCOUNTER (INPATIENT)
Age: 64
LOS: 4 days | Discharge: HOME | DRG: 871 | End: 2019-06-20
Attending: EMERGENCY MEDICINE | Admitting: INTERNAL MEDICINE
Payer: COMMERCIAL

## 2019-06-16 DIAGNOSIS — J18.9 MULTIFOCAL PNEUMONIA: ICD-10-CM

## 2019-06-16 DIAGNOSIS — J96.01 ACUTE RESPIRATORY FAILURE WITH HYPOXIA (HCC): Primary | ICD-10-CM

## 2019-06-16 PROBLEM — A41.9 SEPSIS (HCC): Status: ACTIVE | Noted: 2019-06-16

## 2019-06-16 LAB
A/G RATIO: 0.9 (ref 1.1–2.2)
ALBUMIN SERPL-MCNC: 3.1 G/DL (ref 3.4–5)
ALP BLD-CCNC: 99 U/L (ref 40–129)
ALT SERPL-CCNC: 26 U/L (ref 10–40)
ANION GAP SERPL CALCULATED.3IONS-SCNC: 12 MMOL/L (ref 3–16)
AST SERPL-CCNC: 52 U/L (ref 15–37)
BACTERIA: ABNORMAL /HPF
BASOPHILS ABSOLUTE: 0.1 K/UL (ref 0–0.2)
BASOPHILS RELATIVE PERCENT: 0.5 %
BILIRUB SERPL-MCNC: 0.5 MG/DL (ref 0–1)
BILIRUBIN URINE: NEGATIVE
BLOOD, URINE: NEGATIVE
BUN BLDV-MCNC: 10 MG/DL (ref 7–20)
CALCIUM SERPL-MCNC: 8.8 MG/DL (ref 8.3–10.6)
CHLORIDE BLD-SCNC: 99 MMOL/L (ref 99–110)
CLARITY: CLEAR
CO2: 28 MMOL/L (ref 21–32)
COLOR: YELLOW
CREAT SERPL-MCNC: 0.6 MG/DL (ref 0.6–1.2)
EOSINOPHILS ABSOLUTE: 0.6 K/UL (ref 0–0.6)
EOSINOPHILS RELATIVE PERCENT: 3.8 %
EPITHELIAL CELLS, UA: ABNORMAL /HPF
GFR AFRICAN AMERICAN: >60
GFR NON-AFRICAN AMERICAN: >60
GLOBULIN: 3.6 G/DL
GLUCOSE BLD-MCNC: 147 MG/DL (ref 70–99)
GLUCOSE URINE: NEGATIVE MG/DL
HCT VFR BLD CALC: 35.6 % (ref 36–48)
HEMOGLOBIN: 11.4 G/DL (ref 12–16)
KETONES, URINE: NEGATIVE MG/DL
LACTIC ACID, SEPSIS: 1.4 MMOL/L (ref 0.4–1.9)
LACTIC ACID, SEPSIS: 2.6 MMOL/L (ref 0.4–1.9)
LACTIC ACID, SEPSIS: 2.7 MMOL/L (ref 0.4–1.9)
LACTIC ACID: 2.1 MMOL/L (ref 0.4–2)
LEUKOCYTE ESTERASE, URINE: ABNORMAL
LYMPHOCYTES ABSOLUTE: 3 K/UL (ref 1–5.1)
LYMPHOCYTES RELATIVE PERCENT: 20.8 %
MCH RBC QN AUTO: 26.9 PG (ref 26–34)
MCHC RBC AUTO-ENTMCNC: 31.9 G/DL (ref 31–36)
MCV RBC AUTO: 84.1 FL (ref 80–100)
MICROSCOPIC EXAMINATION: YES
MONOCYTES ABSOLUTE: 0.9 K/UL (ref 0–1.3)
MONOCYTES RELATIVE PERCENT: 6.3 %
NEUTROPHILS ABSOLUTE: 9.9 K/UL (ref 1.7–7.7)
NEUTROPHILS RELATIVE PERCENT: 68.6 %
NITRITE, URINE: NEGATIVE
PDW BLD-RTO: 16.4 % (ref 12.4–15.4)
PH UA: 7 (ref 5–8)
PLATELET # BLD: 197 K/UL (ref 135–450)
PMV BLD AUTO: 6.9 FL (ref 5–10.5)
POTASSIUM REFLEX MAGNESIUM: 3.7 MMOL/L (ref 3.5–5.1)
PRO-BNP: 148 PG/ML (ref 0–124)
PROCALCITONIN: 0.16 NG/ML (ref 0–0.15)
PROTEIN UA: NEGATIVE MG/DL
RBC # BLD: 4.23 M/UL (ref 4–5.2)
RBC UA: ABNORMAL /HPF (ref 0–2)
SODIUM BLD-SCNC: 139 MMOL/L (ref 136–145)
SPECIFIC GRAVITY UA: <=1.005 (ref 1–1.03)
TOTAL PROTEIN: 6.7 G/DL (ref 6.4–8.2)
TROPONIN: <0.01 NG/ML
URINE REFLEX TO CULTURE: YES
URINE TYPE: ABNORMAL
UROBILINOGEN, URINE: 0.2 E.U./DL
WBC # BLD: 14.4 K/UL (ref 4–11)
WBC UA: ABNORMAL /HPF (ref 0–5)

## 2019-06-16 PROCEDURE — 84484 ASSAY OF TROPONIN QUANT: CPT

## 2019-06-16 PROCEDURE — 36415 COLL VENOUS BLD VENIPUNCTURE: CPT

## 2019-06-16 PROCEDURE — 2580000003 HC RX 258: Performed by: INTERNAL MEDICINE

## 2019-06-16 PROCEDURE — 80053 COMPREHEN METABOLIC PANEL: CPT

## 2019-06-16 PROCEDURE — 85025 COMPLETE CBC W/AUTO DIFF WBC: CPT

## 2019-06-16 PROCEDURE — 87040 BLOOD CULTURE FOR BACTERIA: CPT

## 2019-06-16 PROCEDURE — 6370000000 HC RX 637 (ALT 250 FOR IP): Performed by: PHYSICIAN ASSISTANT

## 2019-06-16 PROCEDURE — 99285 EMERGENCY DEPT VISIT HI MDM: CPT

## 2019-06-16 PROCEDURE — 81001 URINALYSIS AUTO W/SCOPE: CPT

## 2019-06-16 PROCEDURE — 2060000000 HC ICU INTERMEDIATE R&B

## 2019-06-16 PROCEDURE — 71045 X-RAY EXAM CHEST 1 VIEW: CPT

## 2019-06-16 PROCEDURE — 83605 ASSAY OF LACTIC ACID: CPT

## 2019-06-16 PROCEDURE — 84145 PROCALCITONIN (PCT): CPT

## 2019-06-16 PROCEDURE — 87449 NOS EACH ORGANISM AG IA: CPT

## 2019-06-16 PROCEDURE — 6360000002 HC RX W HCPCS: Performed by: PHYSICIAN ASSISTANT

## 2019-06-16 PROCEDURE — 6360000002 HC RX W HCPCS: Performed by: INTERNAL MEDICINE

## 2019-06-16 PROCEDURE — 93005 ELECTROCARDIOGRAM TRACING: CPT | Performed by: PHYSICIAN ASSISTANT

## 2019-06-16 PROCEDURE — 71250 CT THORAX DX C-: CPT

## 2019-06-16 PROCEDURE — 83880 ASSAY OF NATRIURETIC PEPTIDE: CPT

## 2019-06-16 PROCEDURE — 87086 URINE CULTURE/COLONY COUNT: CPT

## 2019-06-16 PROCEDURE — 2580000003 HC RX 258: Performed by: PHYSICIAN ASSISTANT

## 2019-06-16 RX ORDER — SODIUM CHLORIDE 0.9 % (FLUSH) 0.9 %
10 SYRINGE (ML) INJECTION EVERY 12 HOURS SCHEDULED
Status: DISCONTINUED | OUTPATIENT
Start: 2019-06-16 | End: 2019-06-20 | Stop reason: HOSPADM

## 2019-06-16 RX ORDER — HYDROXYZINE HYDROCHLORIDE 10 MG/1
10 TABLET, FILM COATED ORAL 3 TIMES DAILY PRN
Status: ON HOLD | COMMUNITY
End: 2019-12-23

## 2019-06-16 RX ORDER — AZELASTINE HYDROCHLORIDE, FLUTICASONE PROPIONATE 137; 50 UG/1; UG/1
1 SPRAY, METERED NASAL DAILY
COMMUNITY
End: 2020-02-24

## 2019-06-16 RX ORDER — SODIUM CHLORIDE 0.9 % (FLUSH) 0.9 %
10 SYRINGE (ML) INJECTION PRN
Status: DISCONTINUED | OUTPATIENT
Start: 2019-06-16 | End: 2019-06-20 | Stop reason: HOSPADM

## 2019-06-16 RX ORDER — IPRATROPIUM BROMIDE AND ALBUTEROL SULFATE 2.5; .5 MG/3ML; MG/3ML
1 SOLUTION RESPIRATORY (INHALATION) ONCE
Status: COMPLETED | OUTPATIENT
Start: 2019-06-16 | End: 2019-06-16

## 2019-06-16 RX ORDER — LORAZEPAM 0.5 MG/1
0.5 TABLET ORAL EVERY 6 HOURS PRN
Status: ON HOLD | COMMUNITY
End: 2019-12-23

## 2019-06-16 RX ORDER — SODIUM CHLORIDE 9 MG/ML
INJECTION, SOLUTION INTRAVENOUS
Status: DISPENSED
Start: 2019-06-16 | End: 2019-06-17

## 2019-06-16 RX ORDER — 0.9 % SODIUM CHLORIDE 0.9 %
500 INTRAVENOUS SOLUTION INTRAVENOUS ONCE
Status: COMPLETED | OUTPATIENT
Start: 2019-06-16 | End: 2019-06-16

## 2019-06-16 RX ORDER — SODIUM CHLORIDE 9 MG/ML
INJECTION, SOLUTION INTRAVENOUS CONTINUOUS
Status: DISCONTINUED | OUTPATIENT
Start: 2019-06-16 | End: 2019-06-18

## 2019-06-16 RX ADMIN — ENOXAPARIN SODIUM 40 MG: 40 INJECTION SUBCUTANEOUS at 20:42

## 2019-06-16 RX ADMIN — SODIUM CHLORIDE: 9 INJECTION, SOLUTION INTRAVENOUS at 20:42

## 2019-06-16 RX ADMIN — MEROPENEM 1 G: 1 INJECTION, POWDER, FOR SOLUTION INTRAVENOUS at 17:01

## 2019-06-16 RX ADMIN — VANCOMYCIN HYDROCHLORIDE 1500 MG: 1 INJECTION, POWDER, LYOPHILIZED, FOR SOLUTION INTRAVENOUS at 17:36

## 2019-06-16 RX ADMIN — IPRATROPIUM BROMIDE AND ALBUTEROL SULFATE 1 AMPULE: .5; 3 SOLUTION RESPIRATORY (INHALATION) at 13:32

## 2019-06-16 RX ADMIN — SODIUM CHLORIDE 500 ML: 9 INJECTION, SOLUTION INTRAVENOUS at 15:25

## 2019-06-16 ASSESSMENT — ENCOUNTER SYMPTOMS
WHEEZING: 1
ABDOMINAL PAIN: 0
VOMITING: 0
SHORTNESS OF BREATH: 1
SPUTUM PRODUCTION: 1
COUGH: 1

## 2019-06-16 ASSESSMENT — PAIN SCALES - GENERAL: PAINLEVEL_OUTOF10: 0

## 2019-06-16 NOTE — ED NOTES
Up to bsc. Lungs clear in upper air fields.  Diminished in lower lobes     Najma Marroquin, MICK  06/16/19 2654

## 2019-06-16 NOTE — ED NOTES
Woke up feeling bad this am with fever. Was hospitalized last week for bronchitis. Increased cough today with yellow sputem. Was placed on 2l per nc after hospital stay last week. Prn and uses at night. Pt was 86% on ra when arrived. Placed on 2l per nc. sats up to 96-97% on 2l.  Took tylenol at 1000 today     Kyle Gu RN  06/16/19 4985

## 2019-06-16 NOTE — ED PROVIDER NOTES
Multifocal pneumonia is   favored. Asymmetric pulmonary edema superimposed upon COPD possible but less   likely. Atypical pneumonia might be considered clinically. RECOMMENDATIONS:   Recommend appropriate treatment for pneumonia with follow-up chest x-ray in   2-4 weeks. Follow-up noncontrast CT scan of the chest in 6 months may be   necessary to document complete resolution. XR CHEST PORTABLE   Final Result   No acute cardiopulmonary disease. Stable coarsening of the interstitial   markings throughout the lungs suggesting an underlying pulmonary fibrosis. LABS:  Labs Reviewed   LACTIC ACID, PLASMA - Abnormal; Notable for the following components:       Result Value    Lactic Acid 2.1 (*)     All other components within normal limits    Narrative:     Performed at:  [facility]. [facility]  [address] [place] [address]. [place], [address] [employer]   Phone [phone]   CBC WITH AUTO DIFFERENTIAL - Abnormal; Notable for the following components:    WBC 14.4 (*)     Hemoglobin 11.4 (*)     Hematocrit 35.6 (*)     RDW 16.4 (*)     Neutrophils # 9.9 (*)     All other components within normal limits    Narrative:     Performed at:  [facility]. [facility]  [address] [place] [address]. [place], [address] [employer]   Phone [phone]   COMPREHENSIVE METABOLIC PANEL W/ REFLEX TO MG FOR LOW K - Abnormal; Notable for the following components:    Glucose 147 (*)     Alb 3.1 (*)     Albumin/Globulin Ratio 0.9 (*)     AST 52 (*)     All other components within normal limits    Narrative:     Performed at:  [facility]. [facility]  [address] [place] [address]. [place] [employer]   Phone [phone] - Abnormal; Notable for the following components:    Pro- (*)     All other components within normal limits    Narrative:     Performed at:  [facility].  [facility]  [address] [place] [address] [place] [address]. [facility], [address] [place] [address]   Phone [phone]   URINE RT REFLEX TO CULTURE - Abnormal; Notable for the following components:    Leukocyte Esterase, Urine MODERATE (*)     All other components within normal limits    Narrative:     Performed at:  [facility]. [facility]  [address] [place] [address]. [facility], [address] [place] [address]   Phone [phone]   MICROSCOPIC URINALYSIS - Abnormal; Notable for the following components:    WBC, UA 6-10 (*)     Bacteria, UA Rare (*)     All other components within normal limits    Narrative:     Performed at:  [facility]. [facility]  [address] [place] [address]. [facility], [address] [place] [address]   Phone [phone]   CULTURE BLOOD #1   CULTURE BLOOD #2   URINE CULTURE   TROPONIN    Narrative:     Performed at:  [facility]. [facility]  [address] [place] [address]. [facility], [address] [place] [address]   Phone [phone]   LACTATE, SEPSIS   LACTATE, SEPSIS       All other labs were within normal range or not returned as of this dictation. EMERGENCY DEPARTMENT COURSE and DIFFERENTIAL DIAGNOSIS/MDM:   Vitals:    Vitals:    06/16/19 1316 06/16/19 1335 06/16/19 1526   BP: 108/74  139/76   Pulse: 73  78   Resp: 20  16   Temp: 98.7 °F (37.1 °C)     TempSrc: Oral     SpO2: (!) 85% 96% 95%   Weight: 211 lb (95.7 kg)     Height: 5' 3\" (1.6 m)           MDM  58-year-old female presents with intermittent fever spikes, shortness of breath and cough. Vital signs show oxygen saturation of 85% on room air however this improved to 96% on 2 L nasal cannula. Physical exam as above. Patient continues to spike fever and have chronic cough. Her discharge note states that she would desaturate with ambulation however here she is hypoxic on room air while in bed. Her work-up shows a lactic acid of 2.1. Chest x-ray consistent with possible pulmonary fibrosis. White count increased from 13-14,000.   Patient did just complete a course of

## 2019-06-16 NOTE — ED NOTES
Consult [facility]  hospitalist via transfer center at [address] [place] [address]  06/16/19 9646

## 2019-06-16 NOTE — ED NOTES
Pt loaded on cot. Report given. Attempted to call report. Nurse will return call back.  Pt left en route to MICK [place]  06/16/19 4393

## 2019-06-16 NOTE — ED PROVIDER NOTES
This patient was seen and evaluated by attending physician Dr Franklin Veloz    Patient states she has not felt well for the past 1 year states has had cough and chest congestion intermittent shortness of breath and fatigue. Worse symptoms the past 3 weeks. 2 weeks ago she was admitted to the hospital for pneumonia and respiratory failure. Has seen pulmonary Dr. Shameka Jones. Has never been a smoker denies any history of COPD. No chest pain. States she has had intermittent fevers the past 3 weeks came back again last night 102. Upon discharge from the hospital 2 weeks ago was placed on oxygen 2 L as needed was placed on prednisone and Levaquin states she felt better when she was taking the medication when she was released from the hospital but now feeling worse again. States her oxygen saturation was 90% when she left the house without oxygen and 85% on room air upon arrival here. She has been doing breathing treatments 3 times a day last was early this morning. The history is provided by the patient and a relative. Shortness of Breath   Onset quality:  Gradual  Duration: 1 year, worse x 3 weeks. Progression:  Waxing and waning  Chronicity:  Chronic  Associated symptoms: cough, fever, sputum production and wheezing    Associated symptoms: no abdominal pain, no chest pain, no syncope and no vomiting    Cough:     Cough characteristics:  Productive    Onset quality:  Gradual  Risk factors: no hx of PE/DVT and no tobacco use        Review of Systems   Constitutional: Positive for fever. HENT: Positive for congestion. Respiratory: Positive for cough, sputum production, shortness of breath and wheezing. Cardiovascular: Negative for chest pain, leg swelling and syncope. Gastrointestinal: Negative for abdominal pain and vomiting. Genitourinary: Negative for difficulty urinating and dysuria. Neurological: Negative for syncope. All other systems reviewed and are negative.         PAST MEDICAL HISTORY   has a past medical history of Anxiety, Depression, Hyperlipidemia, Hypertension, and Rash. PAST SURGICAL HISTORY   has a past surgical history that includes Cholecystectomy and Hysterectomy, total abdominal.    FAMILY HISTORY  family history includes Asthma in her sister; Diabetes in her mother; High Blood Pressure in her mother. SOCIAL HISTORY   reports that she has never smoked. She has never used smokeless tobacco. She reports that she does not drink alcohol or use drugs. HOME MEDICATIONS     Prior to Admission medications    Medication Sig Start Date End Date Taking?  Authorizing Provider   mirtazapine (REMERON) 30 MG tablet TAKE 1 TABLET BY MOUTH EVERY DAY AT NIGHT 6/14/19   Mich Kirk MD   potassium chloride (KLOR-CON M) 10 MEQ extended release tablet Take 1 tablet by mouth daily 6/11/19   Mich Kirk MD   ipratropium-albuterol (DUONEB) 0.5-2.5 (3) MG/3ML SOLN nebulizer solution Inhale 3 mLs into the lungs every 6 hours as needed for Shortness of Breath 6/8/19   Hitesh Shah MD   predniSONE (DELTASONE) 10 MG tablet 30 mg x 3 days, 20 mg x 3 days, 10 mg x 3 days then stop 6/7/19   Anton Roberson MD   furosemide (LASIX) 20 MG tablet Take 1 tablet by mouth daily 6/7/19   Anton Roberson MD   albuterol sulfate  (90 Base) MCG/ACT inhaler Inhale 2 puffs into the lungs 4 times daily as needed for Wheezing 5/30/19   Rimma Hernandez MD   albuterol sulfate  (90 Base) MCG/ACT inhaler Inhale 2 puffs into the lungs 4 times daily as needed for Wheezing 5/30/19   Rimma Hernandez MD   Azelastine-Fluticasone 137-50 MCG/ACT SUSP 1 spray by Nasal route daily 5/2/19   Mich Kirk MD   levothyroxine (SYNTHROID) 150 MCG tablet TAKE 1 TABLET BY MOUTH EVERY DAY 5/2/19   Mich Kirk MD   nadolol (CORGARD) 20 MG tablet TAKE 2 TABLETS BY MOUTH EVERY DAY 5/2/19   Mich Kirk MD   amitriptyline (ELAVIL) 25 MG tablet Take 1 tablet by mouth nightly 5/2/19   Mich Kirk MD   traZODone (DESYREL) 50 MG tablet TAKE 1 TABLET BY MOUTH NIGHTLY 5/2/19   Alistair Malone MD   ketorolac (TORADOL) 10 MG tablet Take 1 tablet by mouth every 6 hours as needed for Pain (migraines) 5/2/19 5/1/20  Alistair Malone MD   sertraline (ZOLOFT) 100 MG tablet Take 2 tablets by mouth daily 5/2/19   Alsitair Malone MD   acetaminophen (TYLENOL) 500 MG tablet Take 1,000 mg by mouth 4/16/16   Historical Provider, MD   aspirin 81 MG chewable tablet Take 1 tablet by mouth daily. 4/3/14   Odilia Singh MD        ALLERGIES  is allergic to penicillins; cefuroxime; doxycycline; imitrex [sumatriptan]; meperidine; sulfa antibiotics; bactrim [sulfamethoxazole-trimethoprim]; keflex [cephalexin]; and tape [adhesive tape]. /74   Pulse 73   Temp 98.7 °F (37.1 °C) (Oral)   Resp 20   Ht 5' 3\" (1.6 m)   Wt 211 lb (95.7 kg)   SpO2 (!) 85%   BMI 37.38 kg/m²     Physical Exam   Constitutional: She is oriented to person, place, and time. She appears well-developed and well-nourished. Nasal cannula in place. HENT:   Head: Normocephalic and atraumatic. Mouth/Throat: Uvula is midline, oropharynx is clear and moist and mucous membranes are normal. No oropharyngeal exudate, posterior oropharyngeal edema or posterior oropharyngeal erythema. Eyes: Pupils are equal, round, and reactive to light. Conjunctivae and EOM are normal.   Neck: Normal range of motion. Neck supple. No JVD present. Cardiovascular: Normal rate, regular rhythm and intact distal pulses. Pulses:       Radial pulses are 2+ on the right side, and 2+ on the left side. Posterior tibial pulses are 2+ on the right side, and 2+ on the left side. Pulmonary/Chest: Effort normal. No stridor. She has wheezes (faint expiratory wheezes). She has no rales. Abdominal: Soft. Bowel sounds are normal. She exhibits no distension and no mass. There is no tenderness. There is no rigidity, no rebound and no guarding. Musculoskeletal: Normal range of motion.  She 5.1 mmol/L    Chloride 99 99 - 110 mmol/L    CO2 28 21 - 32 mmol/L    Anion Gap 12 3 - 16    Glucose 147 (H) 70 - 99 mg/dL    BUN 10 7 - 20 mg/dL    CREATININE 0.6 0.6 - 1.2 mg/dL    GFR Non-African American >60 >60    GFR African American >60 >60    Calcium 8.8 8.3 - 10.6 mg/dL    Total Protein 6.7 6.4 - 8.2 g/dL    Alb 3.1 (L) 3.4 - 5.0 g/dL    Albumin/Globulin Ratio 0.9 (L) 1.1 - 2.2    Total Bilirubin 0.5 0.0 - 1.0 mg/dL    Alkaline Phosphatase 99 40 - 129 U/L    ALT 26 10 - 40 U/L    AST 52 (H) 15 - 37 U/L    Globulin 3.6 g/dL   Brain Natriuretic Peptide   Result Value Ref Range    Pro- (H) 0 - 124 pg/mL   Urinalysis Reflex to Culture   Result Value Ref Range    Color, UA Yellow Straw/Yellow    Clarity, UA Clear Clear    Glucose, Ur Negative Negative mg/dL    Bilirubin Urine Negative Negative    Ketones, Urine Negative Negative mg/dL    Specific Gravity, UA <=1.005 1.005 - 1.030    Blood, Urine Negative Negative    pH, UA 7.0 5.0 - 8.0    Protein, UA Negative Negative mg/dL    Urobilinogen, Urine 0.2 <2.0 E.U./dL    Nitrite, Urine Negative Negative    Leukocyte Esterase, Urine MODERATE (A) Negative    Microscopic Examination YES     Urine Reflex to Culture Yes     Urine Type Not Specified    Microscopic Urinalysis   Result Value Ref Range    WBC, UA 6-10 (A) 0 - 5 /HPF    RBC, UA 0-2 0 - 2 /HPF    Epi Cells 5-10 /HPF    Bacteria, UA Rare (A) /HPF   EKG 12 Lead   Result Value Ref Range    Ventricular Rate 73 BPM    Atrial Rate 73 BPM    P-R Interval 154 ms    QRS Duration 80 ms    Q-T Interval 410 ms    QTc Calculation (Bazett) 451 ms    P Axis 29 degrees    R Axis 38 degrees    T Axis 39 degrees    Diagnosis       Normal sinus rhythmMinimal voltage criteria for LVH, may be normal variantBorderline ECGNo previous ECGs available       Ct Chest Wo Contrast    Result Date: 6/16/2019  EXAMINATION: CT OF THE CHEST WITHOUT CONTRAST 6/16/2019 3:06 pm TECHNIQUE: CT of the chest was performed without the

## 2019-06-16 NOTE — ED NOTES
Pt feels better after neb. sats 89% on ra after neb.  Placed back on 2l per nc.sats up to 96% on 2l     Red Elizondo RN  06/16/19 [id number]

## 2019-06-17 LAB
ANION GAP SERPL CALCULATED.3IONS-SCNC: 12 MMOL/L (ref 3–16)
BASOPHILS ABSOLUTE: 0.1 K/UL (ref 0–0.2)
BASOPHILS RELATIVE PERCENT: 1 %
BUN BLDV-MCNC: 7 MG/DL (ref 7–20)
CALCIUM SERPL-MCNC: 8.4 MG/DL (ref 8.3–10.6)
CHLORIDE BLD-SCNC: 101 MMOL/L (ref 99–110)
CO2: 24 MMOL/L (ref 21–32)
CREAT SERPL-MCNC: <0.5 MG/DL (ref 0.6–1.2)
EKG ATRIAL RATE: 73 BPM
EKG DIAGNOSIS: NORMAL
EKG P AXIS: 29 DEGREES
EKG P-R INTERVAL: 154 MS
EKG Q-T INTERVAL: 410 MS
EKG QRS DURATION: 80 MS
EKG QTC CALCULATION (BAZETT): 451 MS
EKG R AXIS: 38 DEGREES
EKG T AXIS: 39 DEGREES
EKG VENTRICULAR RATE: 73 BPM
EOSINOPHILS ABSOLUTE: 0.5 K/UL (ref 0–0.6)
EOSINOPHILS RELATIVE PERCENT: 3.9 %
GFR AFRICAN AMERICAN: >60
GFR NON-AFRICAN AMERICAN: >60
GLUCOSE BLD-MCNC: 140 MG/DL (ref 70–99)
HCT VFR BLD CALC: 32.2 % (ref 36–48)
HEMOGLOBIN: 10.6 G/DL (ref 12–16)
LACTIC ACID: 1.5 MMOL/L (ref 0.4–2)
LYMPHOCYTES ABSOLUTE: 2 K/UL (ref 1–5.1)
LYMPHOCYTES RELATIVE PERCENT: 16.2 %
MCH RBC QN AUTO: 28.2 PG (ref 26–34)
MCHC RBC AUTO-ENTMCNC: 33 G/DL (ref 31–36)
MCV RBC AUTO: 85.2 FL (ref 80–100)
MONOCYTES ABSOLUTE: 0.7 K/UL (ref 0–1.3)
MONOCYTES RELATIVE PERCENT: 5.8 %
NEUTROPHILS ABSOLUTE: 9 K/UL (ref 1.7–7.7)
NEUTROPHILS RELATIVE PERCENT: 73.1 %
PDW BLD-RTO: 16.1 % (ref 12.4–15.4)
PLATELET # BLD: 166 K/UL (ref 135–450)
PMV BLD AUTO: 7.2 FL (ref 5–10.5)
POTASSIUM REFLEX MAGNESIUM: 3.7 MMOL/L (ref 3.5–5.1)
RBC # BLD: 3.78 M/UL (ref 4–5.2)
SODIUM BLD-SCNC: 137 MMOL/L (ref 136–145)
WBC # BLD: 12.3 K/UL (ref 4–11)

## 2019-06-17 PROCEDURE — 93005 ELECTROCARDIOGRAM TRACING: CPT | Performed by: INTERNAL MEDICINE

## 2019-06-17 PROCEDURE — 99223 1ST HOSP IP/OBS HIGH 75: CPT | Performed by: INTERNAL MEDICINE

## 2019-06-17 PROCEDURE — 2500000003 HC RX 250 WO HCPCS: Performed by: INTERNAL MEDICINE

## 2019-06-17 PROCEDURE — 36415 COLL VENOUS BLD VENIPUNCTURE: CPT

## 2019-06-17 PROCEDURE — 2700000000 HC OXYGEN THERAPY PER DAY

## 2019-06-17 PROCEDURE — 94640 AIRWAY INHALATION TREATMENT: CPT

## 2019-06-17 PROCEDURE — 85025 COMPLETE CBC W/AUTO DIFF WBC: CPT

## 2019-06-17 PROCEDURE — 83605 ASSAY OF LACTIC ACID: CPT

## 2019-06-17 PROCEDURE — 94761 N-INVAS EAR/PLS OXIMETRY MLT: CPT

## 2019-06-17 PROCEDURE — 80048 BASIC METABOLIC PNL TOTAL CA: CPT

## 2019-06-17 PROCEDURE — 2060000000 HC ICU INTERMEDIATE R&B

## 2019-06-17 PROCEDURE — 6370000000 HC RX 637 (ALT 250 FOR IP): Performed by: PHYSICIAN ASSISTANT

## 2019-06-17 PROCEDURE — 93010 ELECTROCARDIOGRAM REPORT: CPT | Performed by: INTERNAL MEDICINE

## 2019-06-17 PROCEDURE — 2580000003 HC RX 258: Performed by: INTERNAL MEDICINE

## 2019-06-17 PROCEDURE — 6360000002 HC RX W HCPCS: Performed by: INTERNAL MEDICINE

## 2019-06-17 RX ORDER — LORAZEPAM 0.5 MG/1
0.5 TABLET ORAL EVERY 6 HOURS PRN
Status: DISCONTINUED | OUTPATIENT
Start: 2019-06-17 | End: 2019-06-20 | Stop reason: HOSPADM

## 2019-06-17 RX ORDER — PANTOPRAZOLE SODIUM 40 MG/1
40 TABLET, DELAYED RELEASE ORAL
Status: DISCONTINUED | OUTPATIENT
Start: 2019-06-17 | End: 2019-06-17

## 2019-06-17 RX ORDER — LEVOTHYROXINE SODIUM 0.15 MG/1
150 TABLET ORAL DAILY
Status: DISCONTINUED | OUTPATIENT
Start: 2019-06-17 | End: 2019-06-20 | Stop reason: HOSPADM

## 2019-06-17 RX ORDER — SERTRALINE HYDROCHLORIDE 100 MG/1
200 TABLET, FILM COATED ORAL DAILY
Status: DISCONTINUED | OUTPATIENT
Start: 2019-06-17 | End: 2019-06-17

## 2019-06-17 RX ORDER — LEVOTHYROXINE SODIUM 0.15 MG/1
150 TABLET ORAL
Status: DISCONTINUED | OUTPATIENT
Start: 2019-06-17 | End: 2019-06-17

## 2019-06-17 RX ORDER — ACETAMINOPHEN 325 MG/1
650 TABLET ORAL EVERY 4 HOURS PRN
Status: DISCONTINUED | OUTPATIENT
Start: 2019-06-17 | End: 2019-06-20 | Stop reason: HOSPADM

## 2019-06-17 RX ORDER — ESOMEPRAZOLE MAGNESIUM 20 MG/1
20 TABLET, DELAYED RELEASE ORAL DAILY
Status: DISCONTINUED | OUTPATIENT
Start: 2019-06-17 | End: 2019-06-17 | Stop reason: CLARIF

## 2019-06-17 RX ORDER — TRAZODONE HYDROCHLORIDE 100 MG/1
100 TABLET ORAL NIGHTLY
Status: DISCONTINUED | OUTPATIENT
Start: 2019-06-17 | End: 2019-06-20 | Stop reason: HOSPADM

## 2019-06-17 RX ORDER — MIRTAZAPINE 30 MG/1
30 TABLET, FILM COATED ORAL NIGHTLY
Status: DISCONTINUED | OUTPATIENT
Start: 2019-06-17 | End: 2019-06-20 | Stop reason: HOSPADM

## 2019-06-17 RX ORDER — IPRATROPIUM BROMIDE AND ALBUTEROL SULFATE 2.5; .5 MG/3ML; MG/3ML
1 SOLUTION RESPIRATORY (INHALATION)
Status: DISCONTINUED | OUTPATIENT
Start: 2019-06-17 | End: 2019-06-20 | Stop reason: HOSPADM

## 2019-06-17 RX ORDER — SERTRALINE HYDROCHLORIDE 100 MG/1
200 TABLET, FILM COATED ORAL DAILY
Status: DISCONTINUED | OUTPATIENT
Start: 2019-06-17 | End: 2019-06-20 | Stop reason: HOSPADM

## 2019-06-17 RX ORDER — ASPIRIN 81 MG/1
81 TABLET, CHEWABLE ORAL DAILY
Status: DISCONTINUED | OUTPATIENT
Start: 2019-06-17 | End: 2019-06-17 | Stop reason: SDUPTHER

## 2019-06-17 RX ORDER — PANTOPRAZOLE SODIUM 40 MG/1
40 TABLET, DELAYED RELEASE ORAL DAILY
Status: DISCONTINUED | OUTPATIENT
Start: 2019-06-17 | End: 2019-06-20 | Stop reason: HOSPADM

## 2019-06-17 RX ORDER — NADOLOL 40 MG/1
40 TABLET ORAL DAILY
Status: DISCONTINUED | OUTPATIENT
Start: 2019-06-18 | End: 2019-06-20 | Stop reason: HOSPADM

## 2019-06-17 RX ORDER — DILTIAZEM HYDROCHLORIDE 5 MG/ML
15 INJECTION INTRAVENOUS ONCE
Status: COMPLETED | OUTPATIENT
Start: 2019-06-17 | End: 2019-06-17

## 2019-06-17 RX ORDER — HYDROXYZINE HYDROCHLORIDE 10 MG/1
10 TABLET, FILM COATED ORAL 3 TIMES DAILY PRN
Status: DISCONTINUED | OUTPATIENT
Start: 2019-06-17 | End: 2019-06-20 | Stop reason: HOSPADM

## 2019-06-17 RX ORDER — AMITRIPTYLINE HYDROCHLORIDE 25 MG/1
25 TABLET, FILM COATED ORAL NIGHTLY
Status: DISCONTINUED | OUTPATIENT
Start: 2019-06-17 | End: 2019-06-20 | Stop reason: HOSPADM

## 2019-06-17 RX ORDER — ASPIRIN 81 MG/1
81 TABLET, CHEWABLE ORAL DAILY
Status: DISCONTINUED | OUTPATIENT
Start: 2019-06-17 | End: 2019-06-20 | Stop reason: HOSPADM

## 2019-06-17 RX ADMIN — IPRATROPIUM BROMIDE AND ALBUTEROL SULFATE 1 AMPULE: .5; 3 SOLUTION RESPIRATORY (INHALATION) at 23:17

## 2019-06-17 RX ADMIN — AMITRIPTYLINE HYDROCHLORIDE 25 MG: 25 TABLET, FILM COATED ORAL at 21:18

## 2019-06-17 RX ADMIN — SODIUM CHLORIDE: 9 INJECTION, SOLUTION INTRAVENOUS at 09:50

## 2019-06-17 RX ADMIN — DILTIAZEM HYDROCHLORIDE 15 MG: 5 INJECTION INTRAVENOUS at 23:22

## 2019-06-17 RX ADMIN — VANCOMYCIN HYDROCHLORIDE 1250 MG: 1 INJECTION, POWDER, LYOPHILIZED, FOR SOLUTION INTRAVENOUS at 07:03

## 2019-06-17 RX ADMIN — MEROPENEM 1 G: 1 INJECTION, POWDER, FOR SOLUTION INTRAVENOUS at 09:50

## 2019-06-17 RX ADMIN — PANTOPRAZOLE SODIUM 40 MG: 40 TABLET, DELAYED RELEASE ORAL at 21:18

## 2019-06-17 RX ADMIN — LORAZEPAM 0.5 MG: 0.5 TABLET ORAL at 18:51

## 2019-06-17 RX ADMIN — MIRTAZAPINE 30 MG: 30 TABLET, FILM COATED ORAL at 21:18

## 2019-06-17 RX ADMIN — ACETAMINOPHEN 650 MG: 325 TABLET, FILM COATED ORAL at 16:27

## 2019-06-17 RX ADMIN — VANCOMYCIN HYDROCHLORIDE 1250 MG: 1 INJECTION, POWDER, LYOPHILIZED, FOR SOLUTION INTRAVENOUS at 18:52

## 2019-06-17 RX ADMIN — ENOXAPARIN SODIUM 40 MG: 40 INJECTION SUBCUTANEOUS at 09:50

## 2019-06-17 RX ADMIN — TRAZODONE HYDROCHLORIDE 100 MG: 100 TABLET ORAL at 21:18

## 2019-06-17 RX ADMIN — MEROPENEM 1 G: 1 INJECTION, POWDER, FOR SOLUTION INTRAVENOUS at 02:26

## 2019-06-17 RX ADMIN — LEVOTHYROXINE SODIUM 150 MCG: 150 TABLET ORAL at 21:18

## 2019-06-17 RX ADMIN — SODIUM CHLORIDE: 9 INJECTION, SOLUTION INTRAVENOUS at 21:35

## 2019-06-17 RX ADMIN — ASPIRIN 81 MG 81 MG: 81 TABLET ORAL at 21:18

## 2019-06-17 RX ADMIN — IPRATROPIUM BROMIDE AND ALBUTEROL SULFATE 1 AMPULE: .5; 3 SOLUTION RESPIRATORY (INHALATION) at 15:08

## 2019-06-17 RX ADMIN — MEROPENEM 1 G: 1 INJECTION, POWDER, FOR SOLUTION INTRAVENOUS at 17:46

## 2019-06-17 RX ADMIN — SERTRALINE 200 MG: 100 TABLET, FILM COATED ORAL at 21:18

## 2019-06-17 RX ADMIN — IPRATROPIUM BROMIDE AND ALBUTEROL SULFATE 1 AMPULE: .5; 3 SOLUTION RESPIRATORY (INHALATION) at 19:49

## 2019-06-17 RX ADMIN — Medication 10 ML: at 21:18

## 2019-06-17 RX ADMIN — IPRATROPIUM BROMIDE AND ALBUTEROL SULFATE 1 AMPULE: .5; 3 SOLUTION RESPIRATORY (INHALATION) at 12:12

## 2019-06-17 ASSESSMENT — PAIN SCALES - GENERAL
PAINLEVEL_OUTOF10: 0
PAINLEVEL_OUTOF10: 4

## 2019-06-17 ASSESSMENT — PAIN DESCRIPTION - FREQUENCY: FREQUENCY: CONTINUOUS

## 2019-06-17 ASSESSMENT — PAIN DESCRIPTION - ONSET: ONSET: GRADUAL

## 2019-06-17 ASSESSMENT — PAIN DESCRIPTION - ORIENTATION: ORIENTATION: MID

## 2019-06-17 ASSESSMENT — PAIN DESCRIPTION - LOCATION: LOCATION: HEAD

## 2019-06-17 ASSESSMENT — PAIN DESCRIPTION - PROGRESSION: CLINICAL_PROGRESSION: GRADUALLY WORSENING

## 2019-06-17 ASSESSMENT — PAIN DESCRIPTION - DESCRIPTORS: DESCRIPTORS: ACHING;HEADACHE

## 2019-06-17 ASSESSMENT — PAIN DESCRIPTION - PAIN TYPE: TYPE: ACUTE PAIN

## 2019-06-17 NOTE — FLOWSHEET NOTE
06/17/19 1627   Pain Assessment   Pain Assessment 0-10   Pain Level 4   Pain Location Head   Pain Orientation Mid   Pain Descriptors Aching;Headache   Pain Onset Gradual   Pain Frequency Continuous   Pain Type Acute pain   Clinical Progression Gradually worsening     Pain as shown above. PRN Tylenol given per [facility] order. Pt. denies further needs at this time. Call light is within reach.   María Ahuja RN

## 2019-06-17 NOTE — PROGRESS NOTES
Pt oriented to room and function of call light. Assessment complete-see flowsheet. Medications given-see MAR. Pt denies further needs at this time. Call light and bedside table within reach. Will continue to monitor.

## 2019-06-17 NOTE — PROGRESS NOTES
Pulmonology consult called to Dr. Maral Norris on call. Spoke with German Roper @1506.     Rc Shell PCA/MT  06/17/2019

## 2019-06-17 NOTE — PROGRESS NOTES
RESPIRATORY THERAPY ASSESSMENT    Name:  Kathie Baron  Medical Record Number:  [id number]  Age: 75 y.o. Gender: female  : 1955  Today's Date:  2019  Room:  /0308-01    Assessment     Is the patient being admitted for a COPD or Asthma exacerbation? No   (If yes the patient will be seen every 4 hours for the first 24 hours and then reassessed)    Patient Admission Diagnosis      Allergies  Allergies   Allergen Reactions    Penicillins Anaphylaxis    Cefuroxime     Doxycycline Hives    Imitrex [Sumatriptan] Other (See Comments)     Feels like pins and needles    Meperidine     Sulfa Antibiotics Hives    Bactrim [Sulfamethoxazole-Trimethoprim] Rash    Keflex [Cephalexin] Itching and Rash    Tape [Adhesive Tape] Rash       Minimum Predicted Vital Capacity:     n/a          Actual Vital Capacity:      n/a              Pulmonary History:No history  Home Oxygen Therapy:  2 liters/min via nasal cannula prn  Home Respiratory Therapy:Albuterol and Albuterol/Ipratropium Bromide HHN   Current Respiratory Therapy:  Duoneb Q4H w/a  Treatment Type: HHN  Medications: Albuterol/Ipratropium    Respiratory Severity Index(RSI)   Patients with orders for inhalation medications, oxygen, or any therapeutic treatment modality will be placed on Respiratory Protocol. They will be assessed with the first treatment and at least every 72 hours thereafter. The following severity scale will be used to determine frequency of treatment intervention.     Smoking History: No Smoking History = 0    Social History  Social History     Tobacco Use    Smoking status: Never Smoker    Smokeless tobacco: Never Used   Substance Use Topics    Alcohol use: No    Drug use: No       Recent Surgical History: None = 0  Past Surgical History  Past Surgical History:   Procedure Laterality Date    CHOLECYSTECTOMY      HYSTERECTOMY, TOTAL ABDOMINAL         Level of Consciousness: Alert, Oriented, and Cooperative = 0    Level of inspiratory hold  4. Bronchodilators will be administered via Hand Held Nebulizer [facility]) to patients when ANY of the following criteria are met  a. Incognizant or uncooperative          b. Patients treated with HHN at Home        c. Unable to demonstrate proper use of MDI with spacer     d. RR > 30 bpm   5. Bronchodilators will be delivered via Metered Dose Inhaler (MDI), HHN, Aerogen to intubated patients on mechanical ventilation. 6. Inhalation medication orders will be delivered and/or substituted as outlined below. Aerosolized Medications Ordering and Administration Guidelines:    1. All Medications will be ordered by a physician, and their frequency and/or modality will be adjusted as defined by the patients Respiratory Severity Index (RSI) score. 2. If the patient does not have documented COPD, consider discontinuing anticholinergics when RSI is less than 9.  3. If the bronchospasm worsens (increased RSI), then the bronchodilator frequency can be increased to a maximum of every 4 hours. If greater than every 4 hours is required, the physician will be contacted. 4. If the bronchospasm improves, the frequency of the bronchodilator can be decreased, based on the patient's RSI, but not less than home treatment regimen frequency. 5. Bronchodilator(s) will be discontinued if patient has a RSI less than 9 and has received no scheduled or as needed treatment for 72  Hrs. Patients Ordered on a Mucolytic Agent:    1. Must always be administered with a bronchodilator. 2. Discontinue if patient experiences worsened bronchospasm, or secretions have lessened to the point that the patient is able to clear them with a cough. Anti-inflammatory and Combination Medications:    1. If the patient lacks prior history of lung disease, is not using inhaled anti-inflammatory medication at home, and lacks wheezing by examination or by history for at least 24 hours, contact physician for possible discontinuation.

## 2019-06-17 NOTE — CARE COORDINATION
No    Wound Clinic: No     Other:       DISCHARGE PLAN:  Spoke with the patient at the bedside. Patient reports that she resides in a one story home with family and plans return. She is active with [facility] for home O2 and neb supplies. She does not anticipate any needs at this time. CM will follow. Explained Case Management role/services.

## 2019-06-17 NOTE — PROGRESS NOTES
Pharmacy note: Vancomycin Day #  1  Patient is a 76 yo female with pneumonia. Pt was started on Merrem to cover gram negative organisms, including Pseudomonas. Pt was also started on Vancomycin to cover gram positive organisms, including MRSA. WBC           BUN/SCr            Calc. CrCl                Ht.             Wt.   14.4            10.0.6                101.3 ml/min            5'3\"           95.7 kg. Adjusted dosing weight = 69.7  kg. Based on patient's age, weight, and renal function will start Vancomycin 1250 mg IVPB q12h and check trough at steady state. Will adjust as necessary. Hermelinda Snowden, Pharm. D. 6/16/2019 8:16 PM

## 2019-06-17 NOTE — PROGRESS NOTES
Bedside report given to [facility]. Pt. denies further needs at this time. Call light is within reach.   Glenys Keane RN

## 2019-06-17 NOTE — H&P
Hospital Medicine History & Physical      PCP: Beatriz Castro MD    Date of Admission: 6/16/2019    Date of Service: Pt seen/examined on 6/17/2019     Chief Complaint:    Chief Complaint   Patient presents with    URI     x 1 year, was just discharged this past saturday for acute respiratory failure and has been having intermitent fevers since. Patient states she usualy feels better and fever improves with tylenol. Last night around 2am fever of 102, patient is not feeling better despite the tylenol         History Of Present Illness: The patient is a 75 y.o. female with dCHF, hypothyroidism, recent admission for bronchitis and acute CHF who presented to the ED with complaint of fever and persistent dyspnea. At time of last discharge she was sent with home O2 and has persistently required it. She never felt better. She returned to ER due to fevers. CT chest showed multifocal PNA. She was admitted for further care. Past Medical History:        Diagnosis Date    Anxiety     Depression     Hyperlipidemia     Hypertension     Rash     Thyroid disease        Past Surgical History:        Procedure Laterality Date    CHOLECYSTECTOMY      HYSTERECTOMY, TOTAL ABDOMINAL         Medications Prior to Admission:    Prior to Admission medications    Medication Sig Start Date End Date Taking? Authorizing Provider   LORazepam (ATIVAN) 0.5 MG tablet Take 0.5 mg by mouth every 6 hours as needed for Anxiety.    Yes Historical Provider, MD   Azelastine-Fluticasone (DYMISTA) 137-50 MCG/ACT SUSP by Nasal route   Yes Historical Provider, MD   Esomeprazole Magnesium (NEXIUM 24HR PO) Take by mouth   Yes Historical Provider, MD   OXYGEN Inhale 2 L into the lungs   Yes Historical Provider, MD   mirtazapine (REMERON) 30 MG tablet TAKE 1 TABLET BY MOUTH EVERY DAY AT NIGHT 6/14/19  Yes Hillary Gamboa MD   potassium chloride (KLOR-CON M) 10 MEQ extended release tablet Take 1 tablet by mouth daily 6/11/19  Yes Zoie Marion Helena Simon MD   ipratropium-albuterol (DUONEB) 0.5-2.5 (3) MG/3ML SOLN nebulizer solution Inhale 3 mLs into the lungs every 6 hours as needed for Shortness of Breath 6/8/19  Yes Windle Mcardle, MD   furosemide (LASIX) 20 MG tablet Take 1 tablet by mouth daily 6/7/19  Yes Travon Watson MD   albuterol sulfate  (90 Base) MCG/ACT inhaler Inhale 2 puffs into the lungs 4 times daily as needed for Wheezing 5/30/19  Yes John Head MD   levothyroxine (SYNTHROID) 150 MCG tablet TAKE 1 TABLET BY MOUTH EVERY DAY 5/2/19  Yes Anali Mohan MD   nadolol (CORGARD) 20 MG tablet TAKE 2 TABLETS BY MOUTH EVERY DAY 5/2/19  Yes Anali Mohan MD   amitriptyline (ELAVIL) 25 MG tablet Take 1 tablet by mouth nightly 5/2/19  Yes Anali Mohan MD   traZODone (DESYREL) 50 MG tablet TAKE 1 TABLET BY MOUTH NIGHTLY 5/2/19  Yes Anali Mohan MD   sertraline (ZOLOFT) 100 MG tablet Take 2 tablets by mouth daily 5/2/19  Yes Anali Mohan MD   acetaminophen (TYLENOL) 500 MG tablet Take 1,000 mg by mouth 4/16/16  Yes Historical Provider, MD   aspirin 81 MG chewable tablet Take 1 tablet by mouth daily. 4/3/14  Yes Martha Vasquez MD   hydrOXYzine (ATARAX) 10 MG tablet Take 10 mg by mouth 3 times daily as needed for Itching    Historical Provider, MD   ketorolac (TORADOL) 10 MG tablet Take 1 tablet by mouth every 6 hours as needed for Pain (migraines) 5/2/19 5/1/20  Anali Mohan MD       Allergies:  Penicillins; Cefuroxime; Doxycycline; Imitrex [sumatriptan]; Meperidine; Sulfa antibiotics; Bactrim [sulfamethoxazole-trimethoprim]; Keflex [cephalexin]; and Tape [adhesive tape]    Social History:  The patient currently lives at home     TOBACCO:   reports that she has never smoked. She has never used smokeless tobacco.  ETOH:   reports that she does not drink alcohol.       Family History:   Positive as follows:        Problem Relation Age of Onset    Diabetes Mother     High Blood Pressure Mother     Asthma Sister REVIEW OF SYSTEMS:       Constitutional: + fever   HENT: Negative for sore throat   Eyes: Negative for redness   Respiratory:+dyspnea, cough   Cardiovascular: Negative for chest pain   Gastrointestinal: Negative for vomiting, diarrhea   Genitourinary: Negative for hematuria   Musculoskeletal: Negative for arthralgias   Skin: Negative for rash   Neurological: Negative for syncope   Hematological: Negative for adenopathy   Psychiatric/Behavorial: Negative for anxiety    PHYSICAL EXAM:    BP (!) 146/68   Pulse 98   Temp 99.4 °F (37.4 °C) (Oral)   Resp 18   Ht 5' 3\" (1.6 m)   Wt 210 lb 3.2 oz (95.3 kg)   SpO2 95%   BMI 37.24 kg/m²   Gen: No distress. Alert. Eyes: PERRL. No sclera icterus. No conjunctival injection. ENT: No discharge. Pharynx clear. Neck: No JVD. No Carotid Bruit. Trachea midline. Resp: No accessory muscle use. +crackles. No wheezes. No rhonchi. CV: Regular rate. Regular rhythm. No murmur. No rub. No edema. Capillary Refill: Brisk,< 3 seconds   Peripheral Pulses: +2 palpable, equal bilaterally   GI: Non-tender. Non-distended. No masses. No organomegaly. Normal bowel sounds. No hernia. Skin: Warm and dry. No nodule on exposed extremities. No rash on exposed extremities. M/S: No cyanosis. No joint deformity. No clubbing. Neuro: Awake. Grossly nonfocal    Psych: Oriented x 3. No anxiety or agitation. I James Milner have reviewed the chart on [facility] and personally interviewed and examined patient, reviewed the data (labs and imaging) and after discussion with my PA formulated the plan. Agree with note with the following edits. HPI:     I reviewed the patient's Past Medical History, Past Surgical History, Medications, and Allergies. 75 y.o. female with dCHF, hypothyroidism, recent admission for bronchitis and acute CHF who presented to the ED with complaint of fever and persistent dyspnea.   At time of last discharge she was sent with home O2 and has persistently required it. She never felt better. She returned to ER due to fevers. CT chest showed multifocal PNA        General:  Elderly female, Awake, alert and oriented. Appears to be not in any distress  Mucous Membranes:  Pink , anicteric  Neck: No JVD, no carotid bruit, no thyromegaly  Chest:  Diminished  kenny with scattered basilar crackles  udCardiovascular:  RRR S1S2 heard, no murmurs or gallops  Abdomen:  Soft, undistended, non tender, no organomegaly, BS present  Extremities: No edema or cyanosis. Distal pulses well felt  Neurological : grossly normal                  CBC:   Recent Labs     06/16/19  1330 06/17/19  0445   WBC 14.4* 12.3*   HGB 11.4* 10.6*   HCT 35.6* 32.2*   MCV 84.1 85.2    166     BMP:   Recent Labs     06/16/19  1330 06/17/19  0445    137   K 3.7 3.7   CL 99 101   CO2 28 24   BUN 10 7   CREATININE 0.6 <0.5*     LIVER PROFILE:   Recent Labs     06/16/19  1330   AST 52*   ALT 26   BILITOT 0.5   ALKPHOS 99     PT/INR: No results for input(s): PROTIME, INR in the last 72 hours. APTT: No results for input(s): APTT in the last 72 hours.   UA:  Recent Labs     06/16/19  1412   COLORU Yellow   PHUR 7.0   WBCUA 6-10*   RBCUA 0-2   BACTERIA Rare*   CLARITYU Clear   SPECGRAV <=1.005   LEUKOCYTESUR MODERATE*   UROBILINOGEN 0.2   BILIRUBINUR Negative   BLOODU Negative   GLUCOSEU Negative          CARDIAC ENZYMES  Recent Labs     06/16/19  1330   TROPONINI <0.01       U/A:    Lab Results   Component Value Date    COLORU Yellow 06/16/2019    WBCUA 6-10 06/16/2019    RBCUA 0-2 06/16/2019    BACTERIA Rare 06/16/2019    CLARITYU Clear 06/16/2019    SPECGRAV <=1.005 06/16/2019    LEUKOCYTESUR MODERATE 06/16/2019    BLOODU Negative 06/16/2019    GLUCOSEU Negative 06/16/2019       ABG  No results found for: PNU3KJH, BEART, A8KVJCHD, PHART, THGBART, KBF7QIL, PO2ART, GYK2DMX    CULTURES  Blood: pending  Urine: pending     EKG:  I have reviewed the EKG with the following interpretation: NSR     RADIOLOGY  CT Chest WO Contrast   Final Result   Moderate multifocal airspace opacity involving all segments of both   lungs-moderately worse over the past 2 weeks. Multifocal pneumonia is   favored. Asymmetric pulmonary edema superimposed upon COPD possible but less   likely. Atypical pneumonia might be considered clinically. RECOMMENDATIONS:   Recommend appropriate treatment for pneumonia with follow-up chest x-ray in   2-4 weeks. Follow-up noncontrast CT scan of the chest in 6 months may be   necessary to document complete resolution. XR CHEST PORTABLE   Final Result   No acute cardiopulmonary disease. Stable coarsening of the interstitial   markings throughout the lungs suggesting an underlying pulmonary fibrosis. Pertinent previous results reviewed   Echo 6/5/19   Normal LV systolic function with an estimated EF of 55-60%.   No regional wall motion abnormalities are seen.  Susan Last is borderline concentric left ventricular hypertrophy.   Grade II diastolic dysfunction with elevated filling pressure.   Mild mitral annular calcification.   The right atrium is mildly dilated.   Mild to moderate mitral and tricuspid regurgitation.   Mild pulmonic regurgitation present.   Systolic pulmonary artery pressure (SPAP) estimated at 42mmHg (RA pressure   8mmHg), consistent with mild pulmonary hypertension.         ASSESSMENT/PLAN:    Acute hypoxic respiratory failure  - suspect related to PNA  - did not improve with diuresis   - wean as able  - of note she was discharged to home with O2 earlier this month     Pneumonia  - recently treated with Levaquin, now with persistent fevers and worsening appearance on imaging  - cover for gram negative organisms and MRSA with vanc and merrem D#1  - inhaled bronchodilators  - pulm c/s   - sepsis criteria met with fever, leucocytosis, lactic acidosis. Improving.   Cont NS today     Chronic diastolic CHF  - compensated  - hold home lasix Hypothyroidism  - home dose of synthroid continued     Anxiety  - home meds continued     Obesity  - Body mass index is 37.24 kg/m².   - Recommended weight loss    DVT Prophylaxis: Lovenox   Diet: DIET GENERAL;  Code Status: Full Code      Anna Marie Jacques PA-C  6/17/2019 9:25 AM      Agree with above  Changes made to note    Joshua Hudson MD 6/17/2019 11:06 AM

## 2019-06-17 NOTE — PLAN OF CARE
Problem: Airway Clearance - Ineffective:  Goal: Ability to maintain a clear airway will improve  Description  Ability to maintain a clear airway will improve  Outcome: Ongoing

## 2019-06-17 NOTE — PROGRESS NOTES
Pt. In bed awake. No signs of distress noted. Pt. Assessment completed- see flow sheet. Pt. denies further needs at this time. Will continue to monitor. Call light is within reach.   Nikkie Gentile RN

## 2019-06-17 NOTE — PROGRESS NOTES
Pt. States that she is having a panic attack. PRN Ativan given per [facility] order. Pt. denies further needs at this time. Will continue to monitor. Call light is within reach. Call light is within reach.

## 2019-06-17 NOTE — CONSULTS
Patient is being seen at the request of Dr. Claire Vargas for a consultation for Acute respiratory failure with hypoxemia    HISTORY OF PRESENT ILLNESS: She is a 43-year-old with a past medical history of restrictive lung disease, mild bronchiectasis, diastolic CHF and hypothyroidism who presented to [facility] with several days of worsening shortness of breath associated with fever. Of note the patient was hospitalized earlier this month for an exacerbation of CHF and upper respiratory infection. She was discharged on home supplemental oxygen. She states she never felt back to her previous baseline after discharge. Yesterday she returned to the hospital with complaints of fevers and shortness of breath. In the emergency department she underwent a chest CT showing multifocal pneumonia. She was admitted to the hospital floor for further care. Of note the patient reported a fever of 102 Fahrenheit prior to admission. Currently she feels about the same. PAST MEDICAL HISTORY:  Past Medical History:   Diagnosis Date    Anxiety     Depression     Hyperlipidemia     Hypertension     Rash     Thyroid disease      PAST SURGICAL HISTORY:  Past Surgical History:   Procedure Laterality Date    CHOLECYSTECTOMY      HYSTERECTOMY, TOTAL ABDOMINAL         FAMILY HISTORY:  family history includes Asthma in her sister; Diabetes in her mother; High Blood Pressure in her mother. SOCIAL HISTORY:   reports that she has never smoked.  She has never used smokeless tobacco.    Scheduled Meds:   [START ON 6/18/2019] pneumococcal 13-valent conjugate  0.5 mL Intramuscular Once    amitriptyline  25 mg Oral Nightly    mirtazapine  30 mg Oral Nightly    [START ON 6/18/2019] nadolol  40 mg Oral Daily    traZODone  100 mg Oral Nightly    ipratropium-albuterol  1 ampule Inhalation Q4H WA    aspirin  81 mg Oral Daily    levothyroxine  150 mcg Oral Daily    sertraline  200 mg Oral Daily    pantoprazole  40 mg Oral Daily    sodium chloride flush  10 mL Intravenous 2 times per day    enoxaparin  40 mg Subcutaneous Daily    meropenem (MERREM) 1 g IVPB (mini-bag)  1 g Intravenous Q8H    vancomycin  1,250 mg Intravenous Q12H     Continuous Infusions:   sodium chloride 100 mL/hr at 06/17/19 0950     PRN Meds:  hydrOXYzine, LORazepam, acetaminophen, sodium chloride flush    ALLERGIES:  Patient is allergic to penicillins; cefuroxime; doxycycline; imitrex [sumatriptan]; meperidine; sulfa antibiotics; bactrim [sulfamethoxazole-trimethoprim]; keflex [cephalexin]; and tape [adhesive tape]. REVIEW OF SYSTEMS:  Constitutional: +  for fever  HENT: Negative for sore throat  Eyes: Negative for redness   Respiratory: + for dyspnea, + cough  Cardiovascular: Negative for chest pain  Gastrointestinal: Negative for vomiting, diarrhea   Genitourinary: Negative for hematuria   Musculoskeletal: Negative for arthralgias   Skin: Negative for rash  Neurological: Negative for syncope  Hematological: Negative for adenopathy  Psychiatric/Behavorial: Negative for anxiety    PHYSICAL EXAM:  Blood pressure 122/61, pulse 82, temperature 98 °F (36.7 °C), temperature source Oral, resp. rate 18, height 5' 3\" (1.6 m), weight 210 lb 3.2 oz (95.3 kg), SpO2 95 %, not currently breastfeeding.' on 2l NC  Gen: No distress. Normocephalic, atraumatic. Eyes: PERRL. No sclera icterus. No conjunctival injection. ENT: No discharge. Pharynx clear. Neck: Trachea midline. No obvious mass. Resp: No accessory muscle use. No crackles. No wheezes. Bilateral rhonchi. No dullness on percussion. CV: Regular rate. Regular rhythm. No murmur or rub. No edema. Peripheral pulses are 2+. Capillary refill is less than 3 seconds. GI: Non-tender. Non-distended. No hernia. Skin: Warm and dry. No nodule on exposed extremities. Lymph: No cervical LAD. No supraclavicular LAD. M/S: No cyanosis. No joint deformity. No clubbing. Neuro: Awake. Alert.  Moves all four extremities. Psych: Oriented x 3. No anxiety. LABS:  CBC:   Recent Labs     06/16/19  1330 06/17/19  0445   WBC 14.4* 12.3*   HGB 11.4* 10.6*   HCT 35.6* 32.2*   MCV 84.1 85.2    166     BMP:   Recent Labs     06/16/19  1330 06/17/19  0445    137   K 3.7 3.7   CL 99 101   CO2 28 24   BUN 10 7   CREATININE 0.6 <0.5*     LIVER PROFILE:   Recent Labs     06/16/19  1330   AST 52*   ALT 26   BILITOT 0.5   ALKPHOS 99     PT/INR: No results for input(s): PROTIME, INR in the last 72 hours. APTT: No results for input(s): APTT in the last 72 hours. UA:  Recent Labs     06/16/19  1412   COLORU Yellow   PHUR 7.0   WBCUA 6-10*   RBCUA 0-2   BACTERIA Rare*   CLARITYU Clear   SPECGRAV <=1.005   LEUKOCYTESUR MODERATE*   UROBILINOGEN 0.2   BILIRUBINUR Negative   BLOODU Negative   GLUCOSEU Negative     No results for input(s): PHART, MJV4EUU, PO2ART in the last 72 hours. Chest imaging was reviewed by me and showed:  Chest CT 6/16: Lungs/pleura: Moderate patchy airspace opacity noted throughout all segments  of both lungs-worse when compared to May 30, 2019.  Central airways are  patent.  No pleural effusion or pneumothorax. ASSESSMENT:  ·  Acute respiratory failure with hypoxemia- secondary to diffuse lung disease most likely from pneumonia  · Healthcare associated pneumonia - probable gram negative, risk for methicillin resistant Staph aureus as well  · Mild bronchiectasis with acute exacerbation  · History of restrictive lung disease    PLAN:  Supplemental oxygen to maintain SaO2 >92%; wean as tolerated  Antibiotics to cover CAP (D2 vanc and Merrem)  Follow sputum and blood cultures  Inhaled bronchodilators  · Patient will likely need repeat chest x-ray in 4 to 6 weeks and chest CT if not complete resolution in several months    Thank you for the consult.

## 2019-06-18 LAB
ANION GAP SERPL CALCULATED.3IONS-SCNC: 12 MMOL/L (ref 3–16)
BASOPHILS ABSOLUTE: 0.1 K/UL (ref 0–0.2)
BASOPHILS RELATIVE PERCENT: 0.7 %
BUN BLDV-MCNC: 4 MG/DL (ref 7–20)
CALCIUM SERPL-MCNC: 8.9 MG/DL (ref 8.3–10.6)
CHLORIDE BLD-SCNC: 106 MMOL/L (ref 99–110)
CO2: 25 MMOL/L (ref 21–32)
CREAT SERPL-MCNC: <0.5 MG/DL (ref 0.6–1.2)
EKG ATRIAL RATE: 100 BPM
EKG DIAGNOSIS: NORMAL
EKG Q-T INTERVAL: 348 MS
EKG QRS DURATION: 86 MS
EKG QTC CALCULATION (BAZETT): 525 MS
EKG R AXIS: 29 DEGREES
EKG T AXIS: -3 DEGREES
EKG VENTRICULAR RATE: 137 BPM
EOSINOPHILS ABSOLUTE: 0.4 K/UL (ref 0–0.6)
EOSINOPHILS RELATIVE PERCENT: 4.2 %
GFR AFRICAN AMERICAN: >60
GFR NON-AFRICAN AMERICAN: >60
GLUCOSE BLD-MCNC: 140 MG/DL (ref 70–99)
HCT VFR BLD CALC: 37.6 % (ref 36–48)
HEMOGLOBIN: 12.3 G/DL (ref 12–16)
L. PNEUMOPHILA SEROGP 1 UR AG: NORMAL
LYMPHOCYTES ABSOLUTE: 2.1 K/UL (ref 1–5.1)
LYMPHOCYTES RELATIVE PERCENT: 20.2 %
MCH RBC QN AUTO: 28.1 PG (ref 26–34)
MCHC RBC AUTO-ENTMCNC: 32.7 G/DL (ref 31–36)
MCV RBC AUTO: 85.9 FL (ref 80–100)
MONOCYTES ABSOLUTE: 0.6 K/UL (ref 0–1.3)
MONOCYTES RELATIVE PERCENT: 5.7 %
NEUTROPHILS ABSOLUTE: 7.3 K/UL (ref 1.7–7.7)
NEUTROPHILS RELATIVE PERCENT: 69.2 %
PDW BLD-RTO: 16.9 % (ref 12.4–15.4)
PLATELET # BLD: 193 K/UL (ref 135–450)
PMV BLD AUTO: 7.3 FL (ref 5–10.5)
POTASSIUM REFLEX MAGNESIUM: 3.9 MMOL/L (ref 3.5–5.1)
RBC # BLD: 4.38 M/UL (ref 4–5.2)
SODIUM BLD-SCNC: 143 MMOL/L (ref 136–145)
STREP PNEUMONIAE ANTIGEN, URINE: NORMAL
TSH REFLEX: 2.07 UIU/ML (ref 0.27–4.2)
URINE CULTURE, ROUTINE: NORMAL
VANCOMYCIN TROUGH: 21.9 UG/ML (ref 10–20)
WBC # BLD: 10.6 K/UL (ref 4–11)

## 2019-06-18 PROCEDURE — 6360000002 HC RX W HCPCS

## 2019-06-18 PROCEDURE — 6370000000 HC RX 637 (ALT 250 FOR IP): Performed by: PHYSICIAN ASSISTANT

## 2019-06-18 PROCEDURE — 2580000003 HC RX 258: Performed by: INTERNAL MEDICINE

## 2019-06-18 PROCEDURE — 99233 SBSQ HOSP IP/OBS HIGH 50: CPT | Performed by: INTERNAL MEDICINE

## 2019-06-18 PROCEDURE — 85025 COMPLETE CBC W/AUTO DIFF WBC: CPT

## 2019-06-18 PROCEDURE — 36415 COLL VENOUS BLD VENIPUNCTURE: CPT

## 2019-06-18 PROCEDURE — G0009 ADMIN PNEUMOCOCCAL VACCINE: HCPCS

## 2019-06-18 PROCEDURE — 2060000000 HC ICU INTERMEDIATE R&B

## 2019-06-18 PROCEDURE — 94761 N-INVAS EAR/PLS OXIMETRY MLT: CPT

## 2019-06-18 PROCEDURE — 6360000002 HC RX W HCPCS: Performed by: INTERNAL MEDICINE

## 2019-06-18 PROCEDURE — 2700000000 HC OXYGEN THERAPY PER DAY

## 2019-06-18 PROCEDURE — 93010 ELECTROCARDIOGRAM REPORT: CPT | Performed by: INTERNAL MEDICINE

## 2019-06-18 PROCEDURE — 80048 BASIC METABOLIC PNL TOTAL CA: CPT

## 2019-06-18 PROCEDURE — 80202 ASSAY OF VANCOMYCIN: CPT

## 2019-06-18 PROCEDURE — 99232 SBSQ HOSP IP/OBS MODERATE 35: CPT | Performed by: INTERNAL MEDICINE

## 2019-06-18 PROCEDURE — 84443 ASSAY THYROID STIM HORMONE: CPT

## 2019-06-18 PROCEDURE — 90670 PCV13 VACCINE IM: CPT

## 2019-06-18 PROCEDURE — 99223 1ST HOSP IP/OBS HIGH 75: CPT | Performed by: INTERNAL MEDICINE

## 2019-06-18 PROCEDURE — 94640 AIRWAY INHALATION TREATMENT: CPT

## 2019-06-18 PROCEDURE — 2500000003 HC RX 250 WO HCPCS: Performed by: INTERNAL MEDICINE

## 2019-06-18 RX ORDER — DILTIAZEM HYDROCHLORIDE 60 MG/1
30 TABLET, FILM COATED ORAL EVERY 6 HOURS SCHEDULED
Status: DISCONTINUED | OUTPATIENT
Start: 2019-06-18 | End: 2019-06-18

## 2019-06-18 RX ADMIN — PANTOPRAZOLE SODIUM 40 MG: 40 TABLET, DELAYED RELEASE ORAL at 21:01

## 2019-06-18 RX ADMIN — LEVOTHYROXINE SODIUM 150 MCG: 150 TABLET ORAL at 21:01

## 2019-06-18 RX ADMIN — Medication 10 ML: at 21:01

## 2019-06-18 RX ADMIN — PNEUMOCOCCAL 13-VALENT CONJUGATE VACCINE 0.5 ML: 2.2; 2.2; 2.2; 2.2; 2.2; 4.4; 2.2; 2.2; 2.2; 2.2; 2.2; 2.2; 2.2 INJECTION, SUSPENSION INTRAMUSCULAR at 09:28

## 2019-06-18 RX ADMIN — ENOXAPARIN SODIUM 40 MG: 40 INJECTION SUBCUTANEOUS at 09:27

## 2019-06-18 RX ADMIN — ACETAMINOPHEN 650 MG: 325 TABLET, FILM COATED ORAL at 16:18

## 2019-06-18 RX ADMIN — NADOLOL 40 MG: 40 TABLET ORAL at 09:28

## 2019-06-18 RX ADMIN — MEROPENEM 1 G: 1 INJECTION, POWDER, FOR SOLUTION INTRAVENOUS at 09:28

## 2019-06-18 RX ADMIN — IPRATROPIUM BROMIDE AND ALBUTEROL SULFATE 1 AMPULE: .5; 3 SOLUTION RESPIRATORY (INHALATION) at 23:39

## 2019-06-18 RX ADMIN — IPRATROPIUM BROMIDE AND ALBUTEROL SULFATE 1 AMPULE: .5; 3 SOLUTION RESPIRATORY (INHALATION) at 18:51

## 2019-06-18 RX ADMIN — MIRTAZAPINE 30 MG: 30 TABLET, FILM COATED ORAL at 21:01

## 2019-06-18 RX ADMIN — ACETAMINOPHEN 650 MG: 325 TABLET, FILM COATED ORAL at 21:26

## 2019-06-18 RX ADMIN — ACETAMINOPHEN 650 MG: 325 TABLET, FILM COATED ORAL at 06:04

## 2019-06-18 RX ADMIN — TRAZODONE HYDROCHLORIDE 100 MG: 100 TABLET ORAL at 21:01

## 2019-06-18 RX ADMIN — VANCOMYCIN HYDROCHLORIDE 1250 MG: 1 INJECTION, POWDER, LYOPHILIZED, FOR SOLUTION INTRAVENOUS at 05:59

## 2019-06-18 RX ADMIN — IPRATROPIUM BROMIDE AND ALBUTEROL SULFATE 1 AMPULE: .5; 3 SOLUTION RESPIRATORY (INHALATION) at 11:12

## 2019-06-18 RX ADMIN — IPRATROPIUM BROMIDE AND ALBUTEROL SULFATE 1 AMPULE: .5; 3 SOLUTION RESPIRATORY (INHALATION) at 15:11

## 2019-06-18 RX ADMIN — ASPIRIN 81 MG 81 MG: 81 TABLET ORAL at 21:01

## 2019-06-18 RX ADMIN — AMITRIPTYLINE HYDROCHLORIDE 25 MG: 25 TABLET, FILM COATED ORAL at 21:01

## 2019-06-18 RX ADMIN — DILTIAZEM HYDROCHLORIDE 5 MG/HR: 5 INJECTION INTRAVENOUS at 01:09

## 2019-06-18 RX ADMIN — IPRATROPIUM BROMIDE AND ALBUTEROL SULFATE 1 AMPULE: .5; 3 SOLUTION RESPIRATORY (INHALATION) at 06:55

## 2019-06-18 RX ADMIN — MEROPENEM 1 G: 1 INJECTION, POWDER, FOR SOLUTION INTRAVENOUS at 00:43

## 2019-06-18 RX ADMIN — MEROPENEM 1 G: 1 INJECTION, POWDER, FOR SOLUTION INTRAVENOUS at 16:18

## 2019-06-18 RX ADMIN — SERTRALINE 200 MG: 100 TABLET, FILM COATED ORAL at 21:01

## 2019-06-18 ASSESSMENT — PAIN SCALES - GENERAL
PAINLEVEL_OUTOF10: 3
PAINLEVEL_OUTOF10: 0
PAINLEVEL_OUTOF10: 0
PAINLEVEL_OUTOF10: 2
PAINLEVEL_OUTOF10: 3
PAINLEVEL_OUTOF10: 8

## 2019-06-18 ASSESSMENT — PAIN DESCRIPTION - PAIN TYPE
TYPE: ACUTE PAIN

## 2019-06-18 ASSESSMENT — PAIN DESCRIPTION - ONSET: ONSET: GRADUAL

## 2019-06-18 ASSESSMENT — PAIN DESCRIPTION - DESCRIPTORS
DESCRIPTORS: CRAMPING
DESCRIPTORS: HEADACHE
DESCRIPTORS: SHARP

## 2019-06-18 ASSESSMENT — PAIN DESCRIPTION - FREQUENCY: FREQUENCY: CONTINUOUS

## 2019-06-18 ASSESSMENT — PAIN DESCRIPTION - PROGRESSION: CLINICAL_PROGRESSION: NOT CHANGED

## 2019-06-18 ASSESSMENT — PAIN - FUNCTIONAL ASSESSMENT: PAIN_FUNCTIONAL_ASSESSMENT: ACTIVITIES ARE NOT PREVENTED

## 2019-06-18 ASSESSMENT — PAIN DESCRIPTION - LOCATION
LOCATION: CHEST;HEAD
LOCATION: ABDOMEN
LOCATION: HEAD

## 2019-06-18 ASSESSMENT — PAIN DESCRIPTION - ORIENTATION: ORIENTATION: RIGHT

## 2019-06-18 NOTE — PLAN OF CARE
Problem: Discharge Planning:  Goal: Discharged to appropriate level of care  Description  Discharged to appropriate level of care  6/17/2019 2139 by Ulices Lozano RN  Outcome: Ongoing  6/17/2019 0818 by Orville Rubio RN  Outcome: Ongoing  Goal: Participates in care planning  Description  Participates in care planning  6/17/2019 2139 by Ulices Lozano RN  Outcome: Ongoing  6/17/2019 0818 by Orville Rubio RN  Outcome: Ongoing     Problem: Airway Clearance - Ineffective:  Goal: Clear lung sounds  Description  Clear lung sounds  6/17/2019 2139 by Ulices Lozano RN  Outcome: Ongoing  6/17/2019 0818 by Orville Rubio RN  Outcome: Ongoing  Goal: Ability to maintain a clear airway will improve  Description  Ability to maintain a clear airway will improve  6/17/2019 2139 by Ulices Lozano RN  Outcome: Ongoing  6/17/2019 0818 by Orville Rubio RN  Outcome: Ongoing     Problem: Fluid Volume - Deficit:  Goal: Achieves intake and output within specified parameters  Description  Achieves intake and output within specified parameters  6/17/2019 2139 by Ulices Lozano RN  Outcome: Ongoing  6/17/2019 0818 by Orville Rubio RN  Outcome: Ongoing     Problem: Gas Exchange - Impaired:  Goal: Levels of oxygenation will improve  Description  Levels of oxygenation will improve  6/17/2019 2139 by Ulices Lozaon RN  Outcome: Ongoing  6/17/2019 0818 by Orville Rubio RN  Outcome: Ongoing     Problem: Hyperthermia:  Goal: Ability to maintain a body temperature in the normal range will improve  Description  Ability to maintain a body temperature in the normal range will improve  6/17/2019 2139 by Ulices Lozano RN  Outcome: Ongoing  6/17/2019 0818 by Orville Rubio RN  Outcome: Ongoing     Problem: Tobacco Use:  Goal: Will participate in inpatient tobacco-use cessation counseling  Description  Will participate in inpatient tobacco-use cessation counseling  6/17/2019 2139 by Bharath Lehman

## 2019-06-18 NOTE — PROGRESS NOTES
Shift assessment completed. Patient resting quietly in bed with no complaints voiced. Vital signs stable.

## 2019-06-18 NOTE — PROGRESS NOTES
IM Progress Note    Admit Date:  6/16/2019  2    Interval history:  Admitted for recurrent pna and fevers  Afibb overnight converted to NSR     Subjective:  Ms. Rupert Archer seen up in bed, feels better today  Remains on 2 L oxygen  No fevers    Objective:   /66   Pulse 100   Temp 97.8 °F (36.6 °C) (Oral)   Resp 18   Ht 5' 3\" (1.6 m)   Wt 213 lb 1.6 oz (96.7 kg)   SpO2 95%   BMI 37.75 kg/m²       Intake/Output Summary (Last 24 hours) at 6/18/2019 1055  Last data filed at 6/18/2019 0913  Gross per 24 hour   Intake 2589 ml   Output 2275 ml   Net 314 ml       Physical Exam:        General: elderly female  Awake, alert and oriented. Appears to be not in any distress  Mucous Membranes:  Pink , anicteric  Neck: No JVD, no carotid bruit, no thyromegaly  Chest:  Clear to auscultation bilaterally, minimal bibasilar crackles  Cardiovascular:  RRR S1S2 heard, no murmurs or gallops  Abdomen:  Soft, undistended, non tender, no organomegaly, BS present  Extremities: No edema or cyanosis.  Distal pulses well felt  Neurological : grossly normal          Medications:   Scheduled Medications:    amitriptyline  25 mg Oral Nightly    mirtazapine  30 mg Oral Nightly    nadolol  40 mg Oral Daily    traZODone  100 mg Oral Nightly    ipratropium-albuterol  1 ampule Inhalation Q4H WA    aspirin  81 mg Oral Daily    levothyroxine  150 mcg Oral Daily    sertraline  200 mg Oral Daily    pantoprazole  40 mg Oral Daily    sodium chloride flush  10 mL Intravenous 2 times per day    enoxaparin  40 mg Subcutaneous Daily    meropenem (MERREM) 1 g IVPB (mini-bag)  1 g Intravenous Q8H    vancomycin  1,250 mg Intravenous Q12H     I  hydrOXYzine, LORazepam, acetaminophen, sodium chloride flush    Lab Data:  Recent Labs     06/16/19  1330 06/17/19  0445 06/18/19  0442   WBC 14.4* 12.3* 10.6   HGB 11.4* 10.6* 12.3   HCT 35.6* 32.2* 37.6   MCV 84.1 85.2 85.9    166 193     Recent Labs     06/16/19  1330 06/17/19  0445 06/18/19  0442    137 143   K 3.7 3.7 3.9   CL 99 101 106   CO2 28 24 25   BUN 10 7 4*   CREATININE 0.6 <0.5* <0.5*     Recent Labs     06/16/19  1330   TROPONINI <0.01       Coagulation:   Lab Results   Component Value Date    INR 1.05 06/16/2014    APTT 25.7 06/16/2014     Cardiac markers:   Lab Results   Component Value Date    TROPONINI <0.01 06/16/2019         Lab Results   Component Value Date    ALT 26 06/16/2019    AST 52 (H) 06/16/2019    ALKPHOS 99 06/16/2019    BILITOT 0.5 06/16/2019       Lab Results   Component Value Date    INR 1.05 06/16/2014    PROTIME 11.4 06/16/2014     Cultures  Blood- NGTD  Sputum - pending        RADIOLOGY    CT Chest WO Contrast   Final Result   Moderate multifocal airspace opacity involving all segments of both   lungs-moderately worse over the past 2 weeks. Multifocal pneumonia is   favored. Asymmetric pulmonary edema superimposed upon COPD possible but less   likely. Atypical pneumonia might be considered clinically.       RECOMMENDATIONS:   Recommend appropriate treatment for pneumonia with follow-up chest x-ray in   2-4 weeks. Follow-up noncontrast CT scan of the chest in 6 months may be   necessary to document complete resolution.           XR CHEST PORTABLE   Final Result   No acute cardiopulmonary disease. Stable coarsening of the interstitial   markings throughout the lungs suggesting an underlying pulmonary fibrosis.               Pertinent previous results reviewed   Echo 6/5/19   Normal LV systolic function with an estimated EF of 55-60%.   No regional wall motion abnormalities are seen.  Orelia Celso is borderline concentric left ventricular hypertrophy.   Grade II diastolic dysfunction with elevated filling pressure.   Mild mitral annular calcification.   The right atrium is mildly dilated.   Mild to moderate mitral and tricuspid regurgitation.   Mild pulmonic regurgitation present.   Systolic pulmonary artery pressure (SPAP) estimated at 42mmHg (RA

## 2019-06-18 NOTE — CONSULTS
mother. Home Medications:  Were reviewed and are listed in nursing record. and/or listed below  Prior to Admission medications    Medication Sig Start Date End Date Taking? Authorizing Provider   LORazepam (ATIVAN) 0.5 MG tablet Take 0.5 mg by mouth every 6 hours as needed for Anxiety. Yes Historical Provider, MD   Azelastine-Fluticasone (DYMISTA) 137-50 MCG/ACT SUSP by Nasal route   Yes Historical Provider, MD   Esomeprazole Magnesium (NEXIUM 24HR PO) Take by mouth   Yes Historical Provider, MD   OXYGEN Inhale 2 L into the lungs   Yes Historical Provider, MD   mirtazapine (REMERON) 30 MG tablet TAKE 1 TABLET BY MOUTH EVERY DAY AT NIGHT 6/14/19  Yes Abhishek Adame MD   potassium chloride (KLOR-CON M) 10 MEQ extended release tablet Take 1 tablet by mouth daily 6/11/19  Yes Abhishek Adame MD   ipratropium-albuterol (DUONEB) 0.5-2.5 (3) MG/3ML SOLN nebulizer solution Inhale 3 mLs into the lungs every 6 hours as needed for Shortness of Breath 6/8/19  Yes Doris Linn MD   furosemide (LASIX) 20 MG tablet Take 1 tablet by mouth daily 6/7/19  Yes Law Oneal MD   albuterol sulfate  (90 Base) MCG/ACT inhaler Inhale 2 puffs into the lungs 4 times daily as needed for Wheezing 5/30/19  Yes Yocasta Simons MD   levothyroxine (SYNTHROID) 150 MCG tablet TAKE 1 TABLET BY MOUTH EVERY DAY 5/2/19  Yes Abhishek Adame MD   nadolol (CORGARD) 20 MG tablet TAKE 2 TABLETS BY MOUTH EVERY DAY 5/2/19  Yes Abhishek Adame MD   amitriptyline (ELAVIL) 25 MG tablet Take 1 tablet by mouth nightly 5/2/19  Yes Abhishek Adame MD   traZODone (DESYREL) 50 MG tablet TAKE 1 TABLET BY MOUTH NIGHTLY 5/2/19  Yes Abhishek Adame MD   sertraline (ZOLOFT) 100 MG tablet Take 2 tablets by mouth daily 5/2/19  Yes Abhishek Adame MD   acetaminophen (TYLENOL) 500 MG tablet Take 1,000 mg by mouth 4/16/16  Yes Historical Provider, MD   aspirin 81 MG chewable tablet Take 1 tablet by mouth daily.  4/3/14  Yes Nayeli Arnold MD hydrOXYzine (ATARAX) 10 MG tablet Take 10 mg by mouth 3 times daily as needed for Itching    Historical Provider, MD   ketorolac (TORADOL) 10 MG tablet Take 1 tablet by mouth every 6 hours as needed for Pain (migraines) 5/2/19 5/1/20  Ron Flynn MD        Allergies:  Penicillins; Cefuroxime; Doxycycline; Imitrex [sumatriptan]; Meperidine; Sulfa antibiotics; Bactrim [sulfamethoxazole-trimethoprim]; Keflex [cephalexin]; and Tape [adhesive tape]     Review of Systems:   All 14 point review of symptoms completed. Pertinent positives identified in the HPI, all other review of symptoms negative as below.     Physical Examination:    Vitals:    06/18/19 0659   BP:    Pulse:    Resp:    Temp:    SpO2: 95%    Weight: 213 lb 1.6 oz (96.7 kg)         General Appearance:  Alert, cooperative, no distress, appears stated age   Head:  Normocephalic, without obvious abnormality, atraumatic   Eyes:  PERRL, conjunctiva/corneas clear       Nose: Nares normal, no drainage or sinus tenderness   Throat: Lips, mucosa, and tongue normal   Neck: Supple, symmetrical, trachea midline, no adenopathy, thyroid: not enlarged, symmetric, no tenderness/mass/nodules, no carotid bruit or JVD       Lungs:   Soft bs bilaterally, respirations unlabored   Chest Wall:  No tenderness or deformity   Heart:  Regular rate and rhythm, S1, S2 normal, no rub or gallop; ?soft JOHNATHAN   Abdomen:   Soft, non-tender, bowel sounds active all four quadrants,  no masses, no organomegaly           Extremities: Extremities normal, atraumatic, no cyanosis or edema   Pulses: 2+ and symmetric   Skin: Skin color, texture, turgor normal, no rashes or lesions   Pysch: Normal mood and affect   Neurologic: Normal gross motor and sensory exam.         Labs  CBC:   Lab Results   Component Value Date    WBC 10.6 06/18/2019    RBC 4.38 06/18/2019    HGB 12.3 06/18/2019    HCT 37.6 06/18/2019    MCV 85.9 06/18/2019    RDW 16.9 06/18/2019     06/18/2019     CMP:    Lab Results   Component Value Date     06/18/2019    K 3.9 06/18/2019     06/18/2019    CO2 25 06/18/2019    BUN 4 06/18/2019    CREATININE <0.5 06/18/2019    GFRAA >60 06/18/2019    AGRATIO 0.9 06/16/2019    LABGLOM >60 06/18/2019    LABGLOM 67 05/25/2018    GLUCOSE 140 06/18/2019    PROT 6.7 06/16/2019    CALCIUM 8.9 06/18/2019    BILITOT 0.5 06/16/2019    ALKPHOS 99 06/16/2019    AST 52 06/16/2019    ALT 26 06/16/2019     PT/INR:  No results found for: PTINR  Lab Results   Component Value Date    TROPONINI <0.01 06/16/2019     EKG 6/4/19  Normal sinus rhythmNormal ECGNo previous ECGs availableConfirmed by LILLIE CUTLER, Travon Magana ([id number]) on 6/4/2019 12:03:40 PM    CT chest 6/16/19  FINDINGS: Mediastinum: Evaluation of the mediastinal structures somewhat limited without intravenous contrast.  There are mildly prominent lymph nodes within the right paratracheal region, aortopulmonic window, and subcarinal regions. These are similar to recent exams. Cardiac size upper limits of normal.  No pericardial effusion. Minimal calcification noted within the thoracic aorta. Lungs/pleura: Moderate patchy airspace opacity noted throughout all segments of both lungs-worse when compared to May 30, 2019. Central airways are patent. No pleural effusion or pneumothorax. Upper Abdomen: Images through the upper abdomen are unremarkable on this noncontrast exam.   Soft Tissues/Bones: No acute findings. CXR 6/16/19  No acute cardiopulmonary disease. Stable coarsening of the interstitial markings throughout the lungs suggesting an underlying pulmonary fibrosis. FINDINGS: The lungs are rated in lung volumes are diminished. The cardiomediastinal silhouette is stable. Aortic vascular calcification. Coarsening of the interstitial markings throughout the lungs, left greater than right, without significant interval change.   No superimposed acute infiltrate, significant free pleural fluid or evidence of overt failure     EKG 19  Normal sinus rhythmMinimal voltage criteria for LVH, may be normal variantNonspecific ST abnormalityNo previous ECGs availableConfirmed by Ajith MOREIRA MD ([id number]) on 2019 8:02:33 AM    EK19  I have reviewed EKG with the following interpretation:  Impression:  Atrial fibrillation with rapid ventricular responseMinimal voltage criteria for LVH, may be normal variantST abnormality lateral leads consider ischemiaAbnormal ECGWhen compared with ECG of 2019 13:36,Atrial fibrillation has replaced Sinus rhythmVent. rate has increased BY  64 BPMT wave inversion now evident in Lateral leadsConfirmed by Ajith MOREIRA MD ([id number]) on 2019 7:50:57     ECHO 19 Summary   Normal LV systolic function with an estimated EF of 55-60%.   No regional wall motion abnormalities are seen.  Cherene Evy is borderline concentric left ventricular hypertrophy.   Grade II diastolic dysfunction with elevated filling pressure.   Mild mitral annular calcification.   The right atrium is mildly dilated.   Mild to moderate mitral and tricuspid regurgitation.   Mild pulmonic regurgitation present.   Systolic pulmonary artery pressure (SPAP) estimated at 42mmHg (RA pressure 8mmHg), consistent with mild pulm HTN    Assessment:  Yesica Pollack is a 75 y.o. patient who presented to [facility] with c/o chills, SOB, and cough. She has PMH HTN, HLD, PNA, and hypothyroidism. No prior cardiac diease or afib. Most recent ECHO 19 showed EF 55-60% borderline LVH, Grade II DD; mild MAC, Mild to moderate MR, TR, Mild NJ; mild pulm HTN. She presented with c/o fever/chills, cough with yellowish sputum production, SOB, and malaise. Note initial EKG revealed SR admitted to 2W but then staff noticed she was irregular in rhythm. Stat EKG revealed new onset AFIB with RVR 137bpm; lateral ST change consider ischemia. Started on Diltiazem drip 5mg/hr after 15mg IV bolus given.  Recent d/c' earlier this month for acute respiratory failure/bronchitis on abx. Note CXR with coarsened interstitial markings possible pulm fibrosis. Note CT chest showed findings c/w multi-focal PNA. Note STR=726; She < 0.01; TSH in February 2019=0.82. Diagnosis of new-onset atrial fibrillation likely paroxysmal related to age and pulmonary issues. Tele monitor noted paroxysms of afib which were asymptomatic. She is currently in NSR. Recs:  1. CHADs-vasc=3 and but only 1 episode of afib now resolve. Tele remains NSR. Hold on any anticoagulation for now. 2. Treat PNA as appropriate. 3. D/C diltiazem gtt and follow telemetry. 4. Check TSH now. Feb 2019 = 0.82  5. No need for cardiac testing at this time. Patient Active Problem List   Diagnosis    Chest pain    HTN (hypertension)    Hyperlipidemia with target LDL less than 130    Depression    Hypokalemia    Insomnia    Trochanteric bursitis of both hips    Migraine    Syncope    Gastrointestinal hemorrhage associated with gastric ulcer    Acute blood loss anemia    Fatigue    Acquired hypothyroidism    Mild single current episode of major depressive disorder (HCC)    Anxiety    Acute respiratory distress    Acute respiratory failure with hypoxia (HCC)    Bronchitis    Bronchospasm    Hypoxia    Sepsis (HCC)    Multifocal pneumonia    Cough     Thank you for allowing to us to participate in the care or Ulysses Joy. Further evaluation will be based upon the patient's clinical course and testing results.

## 2019-06-18 NOTE — FLOWSHEET NOTE
06/18/19 1204   Encounter Summary   Services provided to: Patient   Referral/Consult From: Rounding   Support System Unknown   Continue Visiting   (6/18/19/Prayed and visited with patient)   Complexity of Encounter Low   Length of Encounter 15 minutes   Spiritual Assessment Completed Yes   Routine   Type Initial   Assessment Approachable; Hopeful;Coping   Intervention Active listening;Explored feelings, thoughts, concerns;Nurtured hope;Prayer;Sustaining presence/ Ministry of presence   Outcome Expressed gratitude;Engaged in conversation

## 2019-06-18 NOTE — PROGRESS NOTES
Pulmonary Progress Note    CC: shortness of breath     Subjective:   Patient feels less short of breath and reduced cough . She developed afib with RVR overnight. Intake/Output Summary (Last 24 hours) at 6/18/2019 0737  Last data filed at 6/18/2019 0656  Gross per 24 hour   Intake 2589 ml   Output 2275 ml   Net 314 ml       Exam:   /75   Pulse 116   Temp 97 °F (36.1 °C) (Oral)   Resp 18   Ht 5' 3\" (1.6 m)   Wt 213 lb 1.6 oz (96.7 kg)   SpO2 95%   BMI 37.75 kg/m²  on 2l NC  Gen: No distress. Normocephalic, atraumatic. Eyes: PERRL. No sclera icterus. No conjunctival injection. ENT: No discharge. Pharynx clear. Neck: Trachea midline. No obvious mass. Resp: No accessory muscle use. No crackles. No wheezes. few rhonchi. No dullness on percussion. CV: Tachy rate. Irregular rhythm. No murmur or rub. +1 LE edema. GI: Non-tender. Non-distended. No hernia. Skin: Warm and dry. No nodule on exposed extremities. Lymph: No cervical LAD. No supraclavicular LAD. M/S: No cyanosis. No joint deformity. No clubbing. Neuro: Awake. Alert. Moves all four extremities. Psych: Oriented x 3. No anxiety.        Scheduled Meds:   pneumococcal 13-valent conjugate  0.5 mL Intramuscular Once    amitriptyline  25 mg Oral Nightly    mirtazapine  30 mg Oral Nightly    nadolol  40 mg Oral Daily    traZODone  100 mg Oral Nightly    ipratropium-albuterol  1 ampule Inhalation Q4H WA    aspirin  81 mg Oral Daily    levothyroxine  150 mcg Oral Daily    sertraline  200 mg Oral Daily    pantoprazole  40 mg Oral Daily    sodium chloride flush  10 mL Intravenous 2 times per day    enoxaparin  40 mg Subcutaneous Daily    meropenem (MERREM) 1 g IVPB (mini-bag)  1 g Intravenous Q8H    vancomycin  1,250 mg Intravenous Q12H     Continuous Infusions:   diltiazem (CARDIZEM) 125 mg in dextrose 5% 125 mL infusion 5 mg/hr (06/18/19 0109)    sodium chloride 100 mL/hr at 06/17/19 2135     PRN Meds:  hydrOXYzine, LORazepam, acetaminophen, sodium chloride flush    Labs:  CBC:   Recent Labs     06/16/19  1330 06/17/19  0445 06/18/19  0442   WBC 14.4* 12.3* 10.6   HGB 11.4* 10.6* 12.3   HCT 35.6* 32.2* 37.6   MCV 84.1 85.2 85.9    166 193     BMP:   Recent Labs     06/16/19  1330 06/17/19  0445 06/18/19  0442    137 143   K 3.7 3.7 3.9   CL 99 101 106   CO2 28 24 25   BUN 10 7 4*   CREATININE 0.6 <0.5* <0.5*     LIVER PROFILE:   Recent Labs     06/16/19  1330   AST 52*   ALT 26   BILITOT 0.5   ALKPHOS 99     PT/INR: No results for input(s): PROTIME, INR in the last 72 hours. APTT: No results for input(s): APTT in the last 72 hours. UA:  Recent Labs     06/16/19  1412   COLORU Yellow   PHUR 7.0   WBCUA 6-10*   RBCUA 0-2   BACTERIA Rare*   CLARITYU Clear   SPECGRAV <=1.005   LEUKOCYTESUR MODERATE*   UROBILINOGEN 0.2   BILIRUBINUR Negative   BLOODU Negative   GLUCOSEU Negative     No results for input(s): PHART, DHT5GMA, PO2ART in the last 72 hours. Cultures:   Blood 6/16: ngtd    Films:  Chest CT 6/16: Lungs/pleura:  Moderate patchy airspace opacity noted throughout all segments  of both lungs-worse when compared to May 30, 2019.  Central airways are  patent.  No pleural effusion or pneumothorax.           ASSESSMENT:  ·  Acute respiratory failure with hypoxemia- secondary to diffuse lung disease most likely from pneumonia  · Healthcare associated pneumonia - probable gram negative, risk for methicillin resistant Staph aureus as well  · Mild bronchiectasis with acute exacerbation  · History of restrictive lung disease  · afib with RVR     PLAN:  · Supplemental oxygen to maintain SaO2 >92%; wean as tolerated  · Antibiotics to cover CAP (D2 vanc and Merrem)  · Follow sputum and blood cultures  · Inhaled bronchodilators  · Patient will likely need repeat chest x-ray in 4 to 6 weeks and chest CT if not complete resolution in several months  · Cardiology consulted for afib

## 2019-06-18 NOTE — PLAN OF CARE
Patient alert, oriented. Aware of own limitations. Resting quietly in bed. No shortness of breath or dyspne noted. Vital signs stable. Belongings and call light within reach. Fall precautions in place. No falls this shift.

## 2019-06-19 PROBLEM — J47.1 BRONCHIECTASIS WITH ACUTE EXACERBATION (HCC): Status: ACTIVE | Noted: 2019-06-19

## 2019-06-19 LAB
ANION GAP SERPL CALCULATED.3IONS-SCNC: 9 MMOL/L (ref 3–16)
BUN BLDV-MCNC: 4 MG/DL (ref 7–20)
CALCIUM SERPL-MCNC: 8.9 MG/DL (ref 8.3–10.6)
CHLORIDE BLD-SCNC: 107 MMOL/L (ref 99–110)
CO2: 25 MMOL/L (ref 21–32)
CREAT SERPL-MCNC: <0.5 MG/DL (ref 0.6–1.2)
GFR AFRICAN AMERICAN: >60
GFR NON-AFRICAN AMERICAN: >60
GLUCOSE BLD-MCNC: 130 MG/DL (ref 70–99)
POTASSIUM REFLEX MAGNESIUM: 3.7 MMOL/L (ref 3.5–5.1)
SODIUM BLD-SCNC: 141 MMOL/L (ref 136–145)
VANCOMYCIN RANDOM: 10.7 UG/ML

## 2019-06-19 PROCEDURE — 2060000000 HC ICU INTERMEDIATE R&B

## 2019-06-19 PROCEDURE — 2700000000 HC OXYGEN THERAPY PER DAY

## 2019-06-19 PROCEDURE — 94761 N-INVAS EAR/PLS OXIMETRY MLT: CPT

## 2019-06-19 PROCEDURE — 80048 BASIC METABOLIC PNL TOTAL CA: CPT

## 2019-06-19 PROCEDURE — 99232 SBSQ HOSP IP/OBS MODERATE 35: CPT | Performed by: INTERNAL MEDICINE

## 2019-06-19 PROCEDURE — 6370000000 HC RX 637 (ALT 250 FOR IP): Performed by: PHYSICIAN ASSISTANT

## 2019-06-19 PROCEDURE — 6360000002 HC RX W HCPCS: Performed by: INTERNAL MEDICINE

## 2019-06-19 PROCEDURE — 80202 ASSAY OF VANCOMYCIN: CPT

## 2019-06-19 PROCEDURE — 2580000003 HC RX 258

## 2019-06-19 PROCEDURE — 36415 COLL VENOUS BLD VENIPUNCTURE: CPT

## 2019-06-19 PROCEDURE — 2580000003 HC RX 258: Performed by: INTERNAL MEDICINE

## 2019-06-19 PROCEDURE — 99233 SBSQ HOSP IP/OBS HIGH 50: CPT | Performed by: INTERNAL MEDICINE

## 2019-06-19 PROCEDURE — 94640 AIRWAY INHALATION TREATMENT: CPT

## 2019-06-19 RX ORDER — SODIUM CHLORIDE 9 MG/ML
INJECTION, SOLUTION INTRAVENOUS
Status: COMPLETED
Start: 2019-06-19 | End: 2019-06-19

## 2019-06-19 RX ADMIN — PANTOPRAZOLE SODIUM 40 MG: 40 TABLET, DELAYED RELEASE ORAL at 20:11

## 2019-06-19 RX ADMIN — VANCOMYCIN HYDROCHLORIDE 1250 MG: 1 INJECTION, POWDER, LYOPHILIZED, FOR SOLUTION INTRAVENOUS at 06:42

## 2019-06-19 RX ADMIN — NADOLOL 40 MG: 40 TABLET ORAL at 09:13

## 2019-06-19 RX ADMIN — MIRTAZAPINE 30 MG: 30 TABLET, FILM COATED ORAL at 20:11

## 2019-06-19 RX ADMIN — SODIUM CHLORIDE 250 ML: 9 INJECTION, SOLUTION INTRAVENOUS at 23:32

## 2019-06-19 RX ADMIN — MEROPENEM 1 G: 1 INJECTION, POWDER, FOR SOLUTION INTRAVENOUS at 17:26

## 2019-06-19 RX ADMIN — IPRATROPIUM BROMIDE AND ALBUTEROL SULFATE 1 AMPULE: .5; 3 SOLUTION RESPIRATORY (INHALATION) at 07:21

## 2019-06-19 RX ADMIN — Medication 10 ML: at 20:11

## 2019-06-19 RX ADMIN — SERTRALINE 200 MG: 100 TABLET, FILM COATED ORAL at 20:11

## 2019-06-19 RX ADMIN — Medication 10 ML: at 09:29

## 2019-06-19 RX ADMIN — IPRATROPIUM BROMIDE AND ALBUTEROL SULFATE 1 AMPULE: .5; 3 SOLUTION RESPIRATORY (INHALATION) at 10:48

## 2019-06-19 RX ADMIN — IPRATROPIUM BROMIDE AND ALBUTEROL SULFATE 1 AMPULE: .5; 3 SOLUTION RESPIRATORY (INHALATION) at 19:04

## 2019-06-19 RX ADMIN — AMITRIPTYLINE HYDROCHLORIDE 25 MG: 25 TABLET, FILM COATED ORAL at 20:11

## 2019-06-19 RX ADMIN — ASPIRIN 81 MG 81 MG: 81 TABLET ORAL at 20:11

## 2019-06-19 RX ADMIN — VANCOMYCIN HYDROCHLORIDE 1250 MG: 1 INJECTION, POWDER, LYOPHILIZED, FOR SOLUTION INTRAVENOUS at 23:32

## 2019-06-19 RX ADMIN — MEROPENEM 1 G: 1 INJECTION, POWDER, FOR SOLUTION INTRAVENOUS at 09:29

## 2019-06-19 RX ADMIN — MEROPENEM 1 G: 1 INJECTION, POWDER, FOR SOLUTION INTRAVENOUS at 00:21

## 2019-06-19 RX ADMIN — IPRATROPIUM BROMIDE AND ALBUTEROL SULFATE 1 AMPULE: .5; 3 SOLUTION RESPIRATORY (INHALATION) at 23:02

## 2019-06-19 RX ADMIN — ACETAMINOPHEN 650 MG: 325 TABLET, FILM COATED ORAL at 13:55

## 2019-06-19 RX ADMIN — Medication 10 ML: at 17:26

## 2019-06-19 RX ADMIN — TRAZODONE HYDROCHLORIDE 100 MG: 100 TABLET ORAL at 20:12

## 2019-06-19 RX ADMIN — IPRATROPIUM BROMIDE AND ALBUTEROL SULFATE 1 AMPULE: .5; 3 SOLUTION RESPIRATORY (INHALATION) at 14:45

## 2019-06-19 RX ADMIN — LEVOTHYROXINE SODIUM 150 MCG: 150 TABLET ORAL at 20:11

## 2019-06-19 ASSESSMENT — PAIN SCALES - GENERAL
PAINLEVEL_OUTOF10: 3
PAINLEVEL_OUTOF10: 0
PAINLEVEL_OUTOF10: 4

## 2019-06-19 NOTE — PROGRESS NOTES
Pulmonary Progress Note    CC: shortness of breath     Subjective:   Patient feels better today, cough is less. Intake/Output Summary (Last 24 hours) at 6/19/2019 1059  Last data filed at 6/19/2019 0908  Gross per 24 hour   Intake 2110 ml   Output 1600 ml   Net 510 ml       Exam:   BP (!) 116/58   Pulse 83   Temp 98 °F (36.7 °C) (Oral)   Resp 18   Ht 5' 3\" (1.6 m)   Wt 208 lb 8 oz (94.6 kg)   SpO2 96%   BMI 36.93 kg/m²  on 2l NC  Gen: No distress. Normocephalic, atraumatic. Eyes: PERRL. No sclera icterus. No conjunctival injection. ENT: No discharge. Pharynx clear. Neck: Trachea midline. No obvious mass. Resp: No accessory muscle use. No crackles. few wheezes. few rhonchi. No dullness on percussion. CV: Reg rate. regular rhythm. No murmur or rub. +1 LE edema. GI: Non-tender. Non-distended. No hernia. Skin: Warm and dry. No nodule on exposed extremities. Lymph: No cervical LAD. No supraclavicular LAD. M/S: No cyanosis. No joint deformity. No clubbing. Neuro: Awake. Alert. Moves all four extremities. Psych: Oriented x 3. No anxiety.        Scheduled Meds:   vancomycin  1,250 mg Intravenous Q18H    vancomycin (VANCOCIN) intermittent dosing (placeholder)   Other RX Placeholder    amitriptyline  25 mg Oral Nightly    mirtazapine  30 mg Oral Nightly    nadolol  40 mg Oral Daily    traZODone  100 mg Oral Nightly    ipratropium-albuterol  1 ampule Inhalation Q4H WA    aspirin  81 mg Oral Daily    levothyroxine  150 mcg Oral Daily    sertraline  200 mg Oral Daily    pantoprazole  40 mg Oral Daily    sodium chloride flush  10 mL Intravenous 2 times per day    enoxaparin  40 mg Subcutaneous Daily    meropenem (MERREM) 1 g IVPB (mini-bag)  1 g Intravenous Q8H     Continuous Infusions:    PRN Meds:  hydrOXYzine, LORazepam, acetaminophen, sodium chloride flush    Labs:  CBC:   Recent Labs     06/16/19  1330 06/17/19  0445 06/18/19  0442   WBC 14.4* 12.3* 10.6   HGB 11.4* 10.6* 12.3 HCT 35.6* 32.2* 37.6   MCV 84.1 85.2 85.9    166 193     BMP:   Recent Labs     06/17/19  0445 06/18/19  0442 06/19/19  0443    143 141   K 3.7 3.9 3.7    106 107   CO2 24 25 25   BUN 7 4* 4*   CREATININE <0.5* <0.5* <0.5*     LIVER PROFILE:   Recent Labs     06/16/19  1330   AST 52*   ALT 26   BILITOT 0.5   ALKPHOS 99     PT/INR: No results for input(s): PROTIME, INR in the last 72 hours. APTT: No results for input(s): APTT in the last 72 hours. UA:  Recent Labs     06/16/19  1412   COLORU Yellow   PHUR 7.0   WBCUA 6-10*   RBCUA 0-2   BACTERIA Rare*   CLARITYU Clear   SPECGRAV <=1.005   LEUKOCYTESUR MODERATE*   UROBILINOGEN 0.2   BILIRUBINUR Negative   BLOODU Negative   GLUCOSEU Negative     No results for input(s): PHART, WOA0BQA, PO2ART in the last 72 hours. Cultures:   Blood 6/16: ngtd    Films:  Chest CT 6/16: Lungs/pleura:  Moderate patchy airspace opacity noted throughout all segments  of both lungs-worse when compared to May 30, 2019.  Central airways are  patent.  No pleural effusion or pneumothorax.           ASSESSMENT:  ·  Acute respiratory failure with hypoxemia- secondary to diffuse lung disease most likely from pneumonia  · Healthcare associated pneumonia - probable gram negative, risk for methicillin resistant Staph aureus as well  · Mild bronchiectasis with acute exacerbation  · History of restrictive lung disease  · afib with RVR- now back in NSR     PLAN:  · Supplemental oxygen to maintain SaO2 >92%; wean as tolerated  · Antibiotics to cover CAP (D3 vanc and Merrem)  · Follow  blood cultures  · Inhaled bronchodilators  · Patient will likely need repeat chest x-ray in 4 to 6 weeks and chest CT if not complete resolution in several months  · Cardiology consulted for afib

## 2019-06-19 NOTE — PLAN OF CARE
Patient alert, oriented. Aware of own limitations. Respirations unlabored. Lung sounds with bibasilar crackles. Supplemental oxygen in use. Resting quietly in bed. Belongings and call light within reach. Fall precautions in place. No falls this shift.

## 2019-06-19 NOTE — PROGRESS NOTES
Vancomycin Day # 4  Current dose = Holding for high trough  BUN/SRCR 4/ < 0.5               Tmax 98.6  Vancomycin random level this am = 10.7 mcg/ml  Will restart vancomycin at 1250 mg IV q18h. First dose 6/19 @ 0630. A new vancomycin trough has been ordered for 6/21 @ 1200.

## 2019-06-19 NOTE — PLAN OF CARE
Problem: Discharge Planning:  Goal: Discharged to appropriate level of care  Description  Discharged to appropriate level of care  6/18/2019 2242 by Brandt Wyatt RN  Outcome: Ongoing  6/18/2019 1314 by Iris Garcia RN  Outcome: Ongoing  Goal: Participates in care planning  Description  Participates in care planning  Outcome: Ongoing     Problem: Airway Clearance - Ineffective:  Goal: Clear lung sounds  Description  Clear lung sounds  6/18/2019 2242 by Brandt Wyatt RN  Outcome: Ongoing  6/18/2019 1314 by Iris Garcia RN  Outcome: Ongoing  Goal: Ability to maintain a clear airway will improve  Description  Ability to maintain a clear airway will improve  Outcome: Ongoing     Problem: Fluid Volume - Deficit:  Goal: Achieves intake and output within specified parameters  Description  Achieves intake and output within specified parameters  Outcome: Ongoing     Problem: Gas Exchange - Impaired:  Goal: Levels of oxygenation will improve  Description  Levels of oxygenation will improve  Outcome: Ongoing     Problem: Hyperthermia:  Goal: Ability to maintain a body temperature in the normal range will improve  Description  Ability to maintain a body temperature in the normal range will improve  Outcome: Ongoing     Problem: Tobacco Use:  Goal: Will participate in inpatient tobacco-use cessation counseling  Description  Will participate in inpatient tobacco-use cessation counseling  Outcome: Ongoing     Problem: Falls - Risk of:  Goal: Will remain free from falls  Description  Will remain free from falls  6/18/2019 2242 by Brandt Wyatt RN  Outcome: Ongoing  6/18/2019 1314 by Iris Garcia RN  Outcome: Ongoing  Goal: Absence of physical injury  Description  Absence of physical injury  Outcome: Ongoing

## 2019-06-19 NOTE — PROGRESS NOTES
37.6   MCV 84.1 85.2 85.9    166 193     Recent Labs     06/17/19  0445 06/18/19  0442 06/19/19  0443    143 141   K 3.7 3.9 3.7    106 107   CO2 24 25 25   BUN 7 4* 4*   CREATININE <0.5* <0.5* <0.5*     Recent Labs     06/16/19  1330   TROPONINI <0.01       Coagulation:   Lab Results   Component Value Date    INR 1.05 06/16/2014    APTT 25.7 06/16/2014     Cardiac markers:   Lab Results   Component Value Date    TROPONINI <0.01 06/16/2019         Lab Results   Component Value Date    ALT 26 06/16/2019    AST 52 (H) 06/16/2019    ALKPHOS 99 06/16/2019    BILITOT 0.5 06/16/2019       Lab Results   Component Value Date    INR 1.05 06/16/2014    PROTIME 11.4 06/16/2014     Cultures  Blood- NGTD  Sputum - pending        RADIOLOGY    CT Chest WO Contrast   Final Result   Moderate multifocal airspace opacity involving all segments of both   lungs-moderately worse over the past 2 weeks. Multifocal pneumonia is   favored. Asymmetric pulmonary edema superimposed upon COPD possible but less   likely. Atypical pneumonia might be considered clinically.       RECOMMENDATIONS:   Recommend appropriate treatment for pneumonia with follow-up chest x-ray in   2-4 weeks. Follow-up noncontrast CT scan of the chest in 6 months may be   necessary to document complete resolution.           XR CHEST PORTABLE   Final Result   No acute cardiopulmonary disease. Stable coarsening of the interstitial   markings throughout the lungs suggesting an underlying pulmonary fibrosis.               Pertinent previous results reviewed   Echo 6/5/19   Normal LV systolic function with an estimated EF of 55-60%.   No regional wall motion abnormalities are seen.  Sammi Valladares is borderline concentric left ventricular hypertrophy.   Grade II diastolic dysfunction with elevated filling pressure.   Mild mitral annular calcification.   The right atrium is mildly dilated.   Mild to moderate mitral and tricuspid regurgitation.   Mild pulmonic regurgitation present.   Systolic pulmonary artery pressure (SPAP) estimated at 42mmHg (RA pressure   8mmHg), consistent with mild pulmonary hypertension.           ASSESSMENT/PLAN:     Acute hypoxic respiratory failure  - suspect related to PNA  - did not improve with diuresis   - wean as able  - of note she was discharged to home with O2 earlier this month      Pneumonia  - recently treated with Levaquin, now with persistent fevers and worsening appearance on imaging  - cover for gram negative organisms and MRSA with vanc and merrem D#3  - inhaled bronchodilators  - pulm c/s   - sepsis criteria met with fever, leucocytosis, lactic acidosis. Improved. stop IVf    Chronic diastolic CHF  - compensated  - hold home lasix        Afibb , new onset with RVR  - quickly converted back to NSR  - check TSH  - stop cardizem gtt    Hypothyroidism  - home dose of synthroid continued      Anxiety  - home meds continued      Obesity  - Body mass index is 37.24 kg/m².   - Recommended weight loss     DVT Prophylaxis: Lovenox   Diet: DIET GENERAL;  Code Status: Full Code          Jose M Zarate MD 6/19/2019 7:58 AM

## 2019-06-19 NOTE — PROGRESS NOTES
[facility]   Progress Note  Cardiology      HPI: Seeing Ms. Rola Barajas today for f/u afib. She feels much better. Only c/o mid-back pain after coughing and \"straining something. \"         Physical Examination:    Vitals:    06/19/19 1049   BP:    Pulse:    Resp:    Temp:    SpO2: 96%          Constitutional and General Appearance: NAD   Respiratory:  · Normal excursion and expansion without use of accessory muscles  · Resp Auscultation: Soft crackles left base  Cardiovascular:  · The apical impulses not displaced  · Heart tones are crisp and normal  · Cervical veins are not engorged  · The carotid upstroke is normal in amplitude and contour without delay or bruit  · Normal S1S2, No S3, +soft JOHNATHAN  · Peripheral pulses are symmetrical and full  · There is no clubbing, cyanosis of the extremities. · No edema  · Pedal Pulses: 2+ and equal   Abdomen:  · No masses or tenderness  · Liver/Spleen: No Abnormalities Noted  Neurological/Psychiatric:  · Alert and oriented in all spheres  · Moves all extremities well  · No abnormalities of mood, affect, memory, mentation, or behavior are noted  Skin: warm and dry      Lab Results   Component Value Date    WBC 10.6 06/18/2019    HGB 12.3 06/18/2019    HCT 37.6 06/18/2019    MCV 85.9 06/18/2019     06/18/2019     Lab Results   Component Value Date    CREATININE <0.5 (L) 06/19/2019    BUN 4 (L) 06/19/2019     06/19/2019    K 3.7 06/19/2019     06/19/2019    CO2 25 06/19/2019     Lab Results   Component Value Date    INR 1.05 06/16/2014    PROTIME 11.4 06/16/2014        EKG 6/4/19  Normal sinus rhythmNormal ECGNo previous ECGs availableConfirmed by FAHAD MOREIRA MD ([id number]) on 6/4/2019 12:03:40 PM     CT chest 6/16/19  FINDINGS: Mediastinum: Evaluation of the mediastinal structures somewhat limited without intravenous contrast.  There are mildly prominent lymph nodes within the right paratracheal region, aortopulmonic window, and subcarinal regions.  These filling pressure.   Mild mitral annular calcification.   The right atrium is mildly dilated.   Mild to moderate mitral and tricuspid regurgitation.   Mild pulmonic regurgitation present.   Systolic pulmonary artery pressure (SPAP) estimated at 42mmHg (RA pressure 8mmHg), consistent with mild pulm HTN     Assessment:  Kathie Baron is a 75 y.o. patient who presented to [facility] 6/16/19 with c/o chills, SOB, and cough. She has PMH HTN, HLD, PNA, and hypothyroidism. No prior cardiac diease or afib. Most recent ECHO 6/5/19 showed EF 55-60% borderline LVH, Grade II DD; mild MAC, Mild to moderate MR, TR, Mild VT; mild pulm HTN. She presented with c/o fever/chills, cough with yellowish sputum production, SOB, and malaise. Note initial EKG revealed SR admitted to 2W but then staff noticed she was irregular in rhythm. Stat EKG revealed new onset AFIB with RVR 137bpm; lateral ST change consider ischemia. Started on Diltiazem drip 5mg/hr after 15mg IV bolus given. Recent d/c' earlier this month for acute respiratory failure/bronchitis on abx. Note CXR with coarsened interstitial markings possible pulm fibrosis. Note CT chest showed findings c/w multi-focal PNA. Note WBN=630; She < 0.01; TSH in February 2019=0. 82. Diagnosis of new-onset atrial fibrillation likely paroxysmal related to age and pulmonary issues. Tele monitor noted paroxysms of afib which were asymptomatic. She is currently in NSR.     Recs:  1. CHADs-vasc=3 but only 1 documented episode of afib resolved. Tele with NSR since. 2. Treat PNA as appropriate. 3. 6/19/19 TSH=2.07 (Feb 2019 = 0.82)  4. No need for cardiac testing at this time. No further recs and would just follow clinically. OK for d/c from cardiac standpoint when IM/pulm docs feel ready. Signing off. Thanks.        Patient Active Problem List   Diagnosis    Chest pain    HTN (hypertension)    Hyperlipidemia with target LDL less than 130    Depression    Hypokalemia  Insomnia    Trochanteric bursitis of both hips    Migraine    Syncope    Gastrointestinal hemorrhage associated with gastric ulcer    Acute blood loss anemia    Fatigue    Acquired hypothyroidism    Mild single current episode of major depressive disorder (HCC)    Anxiety    Acute respiratory distress    Acute respiratory failure with hypoxia (HCC)    Bronchitis    Bronchospasm    Hypoxia    Sepsis (HCC)    Multifocal pneumonia    Cough    Atrial fibrillation, new onset ([address]    Bronchiectasis with acute exacerbation ([address]

## 2019-06-19 NOTE — CARE COORDINATION
[address] Transitions Interview     2019    Patient: Angel Rowe Patient : 1955   MRN: [id number]  Reason for Admission: resp failure / pna  RARS: Readmission Risk Score: 11         Spoke with: Angel Rowe (patient)      Readmission Risk  Patient Active Problem List   Diagnosis    Chest pain    HTN (hypertension)    Hyperlipidemia with target LDL less than 130    Depression    Hypokalemia    Insomnia    Trochanteric bursitis of both hips    Migraine    Syncope    Gastrointestinal hemorrhage associated with gastric ulcer    Acute blood loss anemia    Fatigue    Acquired hypothyroidism    Mild single current episode of major depressive disorder (HCC)    Anxiety    Acute respiratory distress    Acute respiratory failure with hypoxia (HCC)    Bronchitis    Bronchospasm    Hypoxia    Sepsis (HCC)    Multifocal pneumonia    Cough    Atrial fibrillation, new onset ([facility]    Bronchiectasis with acute exacerbation [facility])       Inpatient Assessment  Care Transitions Summary    Care Transitions Inpatient Review  Medication Review  Are you able to afford your medications?:  Yes  How often do you have difficulty taking your medications?:  I always take them as prescribed. Housing Review  Who do you live with?:  Partner/Spouse/SO  Are you an active caregiver in your home?:  No  Social Support  Do you have a ?:  No  Do you have a [address] [place] [address]?:  No  Durable Medical Equipment  Patient Home Equipment:  Oxygen, Nebulizer  Functional Review  Hearing and Vision  Care Transitions Interventions       Has scale, BP cuff, pulse oximeter. Will start weighing daily and report weigh gain of >3# per day or >5# per week to MD. Oxygen at 2lpm HS. Nebulizer works well and she uses 3-4 x daily. Readmission from home. Care transitions following.     Follow Up  Future Appointments   Date Time Provider Calvin Mcmullen   7/10/2019 11:30 AM Drew Olivas MD [place] [facility]   7/17/2019  8:15 AM MD Lizet Hitchcock       Health Maintenance  Health Maintenance Due   Topic Date Due    Breast cancer screen  06/15/2017    DEXA (modify frequency per FRAX score)  02/06/2018       Franky Lemon RN

## 2019-06-19 NOTE — PROGRESS NOTES
Vanco Day 3:  Trough 21.9. Will hold tonight's dose of Vanco and recheck the random Vanco with AM labs to be sure it is clearing  appropriately. Will continue to monitor and dose per consult.   Aristeo Soliz PharmD 6/18/2019 8:17 PM

## 2019-06-20 VITALS
DIASTOLIC BLOOD PRESSURE: 71 MMHG | TEMPERATURE: 98.1 F | OXYGEN SATURATION: 95 % | RESPIRATION RATE: 18 BRPM | BODY MASS INDEX: 36.94 KG/M2 | SYSTOLIC BLOOD PRESSURE: 105 MMHG | HEIGHT: 63 IN | WEIGHT: 208.5 LBS | HEART RATE: 81 BPM

## 2019-06-20 PROBLEM — A41.9 SEPSIS (HCC): Status: RESOLVED | Noted: 2019-06-16 | Resolved: 2019-06-20

## 2019-06-20 LAB
ANION GAP SERPL CALCULATED.3IONS-SCNC: 11 MMOL/L (ref 3–16)
BUN BLDV-MCNC: 5 MG/DL (ref 7–20)
CALCIUM SERPL-MCNC: 8.7 MG/DL (ref 8.3–10.6)
CHLORIDE BLD-SCNC: 101 MMOL/L (ref 99–110)
CO2: 26 MMOL/L (ref 21–32)
CREAT SERPL-MCNC: <0.5 MG/DL (ref 0.6–1.2)
GFR AFRICAN AMERICAN: >60
GFR NON-AFRICAN AMERICAN: >60
GLUCOSE BLD-MCNC: 140 MG/DL (ref 70–99)
POTASSIUM REFLEX MAGNESIUM: 3.8 MMOL/L (ref 3.5–5.1)
SODIUM BLD-SCNC: 138 MMOL/L (ref 136–145)

## 2019-06-20 PROCEDURE — 6360000002 HC RX W HCPCS: Performed by: INTERNAL MEDICINE

## 2019-06-20 PROCEDURE — 99238 HOSP IP/OBS DSCHRG MGMT 30/<: CPT | Performed by: INTERNAL MEDICINE

## 2019-06-20 PROCEDURE — 94640 AIRWAY INHALATION TREATMENT: CPT

## 2019-06-20 PROCEDURE — 6370000000 HC RX 637 (ALT 250 FOR IP): Performed by: PHYSICIAN ASSISTANT

## 2019-06-20 PROCEDURE — 94669 MECHANICAL CHEST WALL OSCILL: CPT

## 2019-06-20 PROCEDURE — 2700000000 HC OXYGEN THERAPY PER DAY

## 2019-06-20 PROCEDURE — 2580000003 HC RX 258: Performed by: INTERNAL MEDICINE

## 2019-06-20 PROCEDURE — 99232 SBSQ HOSP IP/OBS MODERATE 35: CPT | Performed by: INTERNAL MEDICINE

## 2019-06-20 PROCEDURE — 94761 N-INVAS EAR/PLS OXIMETRY MLT: CPT

## 2019-06-20 PROCEDURE — 80048 BASIC METABOLIC PNL TOTAL CA: CPT

## 2019-06-20 PROCEDURE — 36415 COLL VENOUS BLD VENIPUNCTURE: CPT

## 2019-06-20 RX ORDER — SULFAMETHOXAZOLE AND TRIMETHOPRIM 800; 160 MG/1; MG/1
2 TABLET ORAL 2 TIMES DAILY
Qty: 24 TABLET | Refills: 0 | Status: SHIPPED | OUTPATIENT
Start: 2019-06-20 | End: 2019-06-26 | Stop reason: ALTCHOICE

## 2019-06-20 RX ADMIN — NADOLOL 40 MG: 40 TABLET ORAL at 08:34

## 2019-06-20 RX ADMIN — MEROPENEM 1 G: 1 INJECTION, POWDER, FOR SOLUTION INTRAVENOUS at 01:58

## 2019-06-20 RX ADMIN — IPRATROPIUM BROMIDE AND ALBUTEROL SULFATE 1 AMPULE: .5; 3 SOLUTION RESPIRATORY (INHALATION) at 06:43

## 2019-06-20 RX ADMIN — IPRATROPIUM BROMIDE AND ALBUTEROL SULFATE 1 AMPULE: .5; 3 SOLUTION RESPIRATORY (INHALATION) at 10:30

## 2019-06-20 RX ADMIN — Medication 10 ML: at 08:34

## 2019-06-20 RX ADMIN — MEROPENEM 1 G: 1 INJECTION, POWDER, FOR SOLUTION INTRAVENOUS at 08:34

## 2019-06-20 RX ADMIN — ACETAMINOPHEN 650 MG: 325 TABLET, FILM COATED ORAL at 02:41

## 2019-06-20 ASSESSMENT — PAIN DESCRIPTION - PAIN TYPE: TYPE: ACUTE PAIN

## 2019-06-20 ASSESSMENT — PAIN DESCRIPTION - ONSET: ONSET: ON-GOING

## 2019-06-20 ASSESSMENT — PAIN SCALES - GENERAL
PAINLEVEL_OUTOF10: 3
PAINLEVEL_OUTOF10: 0

## 2019-06-20 ASSESSMENT — PAIN DESCRIPTION - LOCATION: LOCATION: BACK

## 2019-06-20 ASSESSMENT — PAIN - FUNCTIONAL ASSESSMENT: PAIN_FUNCTIONAL_ASSESSMENT: ACTIVITIES ARE NOT PREVENTED

## 2019-06-20 ASSESSMENT — PAIN DESCRIPTION - DESCRIPTORS: DESCRIPTORS: DISCOMFORT

## 2019-06-20 ASSESSMENT — PAIN DESCRIPTION - PROGRESSION: CLINICAL_PROGRESSION: NOT CHANGED

## 2019-06-20 ASSESSMENT — PAIN DESCRIPTION - ORIENTATION: ORIENTATION: UPPER

## 2019-06-20 NOTE — PROGRESS NOTES
Shift assessment complete. Call light and bedside table within reach.  Electronically signed by Sd Ibrahim RN on 6/19/2019 at 8:10 PM

## 2019-06-20 NOTE — CARE COORDINATION
DISCHARGE ORDER  Date/Time 2019 12:37 PM  Completed by: Lady Meeks, Case Management    Patient Name: Angel Rowe    : 1955  Admitting Diagnosis: Sepsis ([facility] [A41.9]  Multifocal pneumonia [J18.9]  Sepsis ([facility] [A41.9]  Multifocal pneumonia [J18.9]  Multifocal pneumonia [J18.9]  Admit Date/Time: 2019 12:59 PM    Noted discharge order. Confirmed discharge plan with patient : Yes   Discharge Plan:  Order for dc noted. Spoke with pt who cont plan for home. Discussed HHC and pt declines. Chart reviewed and no other dc needs identified.

## 2019-06-20 NOTE — PROGRESS NOTES
Pulmonary Progress Note    CC: shortness of breath     Subjective:   Patient feels much better, wants to go home. Intake/Output Summary (Last 24 hours) at 6/20/2019 1146  Last data filed at 6/20/2019 1001  Gross per 24 hour   Intake 2355 ml   Output 2300 ml   Net 55 ml       Exam:   /71   Pulse 81   Temp 98.1 °F (36.7 °C) (Oral)   Resp 18   Ht 5' 3\" (1.6 m)   Wt 208 lb 8 oz (94.6 kg)   SpO2 95%   BMI 36.93 kg/m²  on 2l NC  Gen: No distress. Normocephalic, atraumatic. Eyes: PERRL. No sclera icterus. No conjunctival injection. ENT: No discharge. Pharynx clear. Neck: Trachea midline. No obvious mass. Resp: No accessory muscle use. No crackles. No wheezes. No rhonchi. No dullness on percussion. CV: Reg rate. regular rhythm. No murmur or rub. +1 LE edema. GI: Non-tender. Non-distended. No hernia. Skin: Warm and dry. No nodule on exposed extremities. Lymph: No cervical LAD. No supraclavicular LAD. M/S: No cyanosis. No joint deformity. No clubbing. Neuro: Awake. Alert. Moves all four extremities. Psych: Oriented x 3. No anxiety.        Scheduled Meds:   vancomycin  1,250 mg Intravenous Q18H    vancomycin (VANCOCIN) intermittent dosing (placeholder)   Other RX Placeholder    amitriptyline  25 mg Oral Nightly    mirtazapine  30 mg Oral Nightly    nadolol  40 mg Oral Daily    traZODone  100 mg Oral Nightly    ipratropium-albuterol  1 ampule Inhalation Q4H WA    aspirin  81 mg Oral Daily    levothyroxine  150 mcg Oral Daily    sertraline  200 mg Oral Daily    pantoprazole  40 mg Oral Daily    sodium chloride flush  10 mL Intravenous 2 times per day    enoxaparin  40 mg Subcutaneous Daily    meropenem (MERREM) 1 g IVPB (mini-bag)  1 g Intravenous Q8H     Continuous Infusions:    PRN Meds:  sodium chloride, hydrOXYzine, LORazepam, acetaminophen, sodium chloride flush    Labs:  CBC:   Recent Labs     06/18/19  0442   WBC 10.6   HGB 12.3   HCT 37.6   MCV 85.9        BMP: Recent Labs     06/18/19  0442 06/19/19  0443 06/20/19  0434    141 138   K 3.9 3.7 3.8    107 101   CO2 25 25 26   BUN 4* 4* 5*   CREATININE <0.5* <0.5* <0.5*     LIVER PROFILE:   No results for input(s): AST, ALT, LIPASE, BILIDIR, BILITOT, ALKPHOS in the last 72 hours. Invalid input(s): AMYLASE,  ALB  PT/INR: No results for input(s): PROTIME, INR in the last 72 hours. APTT: No results for input(s): APTT in the last 72 hours. UA:  No results for input(s): NITRITE, COLORU, PHUR, LABCAST, WBCUA, RBCUA, MUCUS, TRICHOMONAS, YEAST, BACTERIA, CLARITYU, SPECGRAV, LEUKOCYTESUR, UROBILINOGEN, BILIRUBINUR, BLOODU, GLUCOSEU, AMORPHOUS in the last 72 hours. Invalid input(s): KETONESU  No results for input(s): PHART, PYF1OBS, PO2ART in the last 72 hours. Cultures:   Blood 6/16: ngtd    Films:  Chest CT 6/16: Lungs/pleura:  Moderate patchy airspace opacity noted throughout all segments  of both lungs-worse when compared to May 30, 2019.  Central airways are  patent.  No pleural effusion or pneumothorax.           ASSESSMENT:  ·  Acute respiratory failure with hypoxemia- secondary to diffuse lung disease most likely from pneumonia  · Healthcare associated pneumonia - probable gram negative, risk for methicillin resistant Staph aureus as well  · Mild bronchiectasis with acute exacerbation  · History of restrictive lung disease  · afib with RVR- now back in NSR     PLAN:  · Supplemental oxygen to maintain SaO2 >92%; wean as tolerated  · Antibiotics to cover CAP (D4 vanc and Merrem)- switch to ceftin and doxy for d/c  · Inhaled bronchodilators  · Patient will likely need repeat chest x-ray in 4 to 6 weeks and chest CT if not complete resolution in several months  · F/u with Dr. Willy Carmona

## 2019-06-20 NOTE — PLAN OF CARE
Problem: Discharge Planning:  Goal: Discharged to appropriate level of care  Description  Discharged to appropriate level of care  6/19/2019 1512 by Hitesh Daniels RN  Outcome: Ongoing  Goal: Participates in care planning  Description  Participates in care planning  6/19/2019 1512 by Hitesh Daniels RN  Outcome: Ongoing     Problem: Airway Clearance - Ineffective:  Goal: Clear lung sounds  Description  Clear lung sounds  6/19/2019 2148 by Riaz Person RN  Outcome: Ongoing  6/19/2019 1512 by Hitesh Daniels RN  Outcome: Ongoing  Goal: Ability to maintain a clear airway will improve  Description  Ability to maintain a clear airway will improve  Outcome: Ongoing     Problem: Gas Exchange - Impaired:  Goal: Levels of oxygenation will improve  Description  Levels of oxygenation will improve  Outcome: Ongoing     Problem: Hyperthermia:  Goal: Ability to maintain a body temperature in the normal range will improve  Description  Ability to maintain a body temperature in the normal range will improve  Outcome: Ongoing     Problem: Falls - Risk of:  Goal: Will remain free from falls  Description  Will remain free from falls  6/19/2019 2148 by Riaz Person RN  Outcome: Ongoing  6/19/2019 1512 by Hitesh Daniels RN  Outcome: Ongoing  Goal: Absence of physical injury  Description  Absence of physical injury  Outcome: Ongoing   Fall risk assessment complete. Temperature this evening is 99.5- will continue to monitor. Lungs note with crackles to the bases.

## 2019-06-20 NOTE — DISCHARGE SUMMARY
Name:  Shelley Posey  Room:  /4100-55  MRN:    [id number]    Discharge Summary      This discharge summary is in conjunction with a complete physical exam done on the day of discharge. Discharging Physician: Dr. Jason Marks: 6/16/2019  Discharge:   6/20/2019     HPI taken from admission H&P:    The patient is a 75 y.o. female with dCHF, hypothyroidism, recent admission for bronchitis and acute CHF who presented to the ED with complaint of fever and persistent dyspnea. At time of last discharge she was sent with home O2 and has persistently required it. She never felt better. She returned to ER due to fevers. CT chest showed multifocal PNA. She was admitted for further care. Diagnoses this Admission and Hospital Course     Acute hypoxic respiratory failure  - suspect related to PNA  - did not improve with diuresis   - wean as able  - of note she was discharged to home with O2 earlier this month, will require 2 L on discharge      Pneumonia  - recently treated with Levaquin, now with persistent fevers and worsening appearance on imaging  - covered for gram negative organisms and MRSA with vanc and merrem D#4. Cultures remained negative. Seen by pulm. Dc to home on bactrim (allergy noted, she has had it in the past and does not recollect an allergic reaction, is willing to try again)  - sepsis criteria met with fever, leucocytosis, lactic acidosis. Resolved.  stop IVf    Chronic diastolic CHF  - compensated  - held home lasix, resume on dc      Afibb , new onset with RVR  - quickly converted back to NSR  - checked TSH: 2/07  - off cardizem gtt  - seen by cardio. [id number] is 3, but with only 1 episode of a fib that resolved did not recommend anticoagulant     Hypothyroidism  - home dose of synthroid continued - euthyroid      Anxiety  - home meds continued      Obesity  - Body mass index is 37.24 kg/m².   - Recommended weight loss      Procedures (Please Review Full Report for Details)  None     Consults    Pulm  Cardio       Physical Exam at Discharge:    /71   Pulse 81   Temp 98.1 °F (36.7 °C) (Oral)   Resp 18   Ht 5' 3\" (1.6 m)   Wt 208 lb 8 oz (94.6 kg)   SpO2 95%   BMI 36.93 kg/m²     General: elderly female  Awake, alert and oriented. Appears to be not in any distress  Mucous Membranes:  Pink , anicteric  Neck: No JVD, no carotid bruit, no thyromegaly  Chest:  Clear to auscultation bilaterally, minimal bibasilar crackles  Cardiovascular:  RRR S1S2 heard, no murmurs or gallops  Abdomen:  Soft, undistended, non tender, no organomegaly, BS present  Extremities: No edema or cyanosis. Distal pulses well felt  Neurological : grossly normal      CBC:   Recent Labs     06/18/19  0442   WBC 10.6   HGB 12.3   HCT 37.6   MCV 85.9        BMP:   Recent Labs     06/18/19  0442 06/19/19  0443 06/20/19  0434    141 138   K 3.9 3.7 3.8    107 101   CO2 25 25 26   BUN 4* 4* 5*   CREATININE <0.5* <0.5* <0.5*       CULTURES  Blood: NG  Strep pneumo ag: neg  Legionella ag: neg  Urine: mixed sarabjit     RADIOLOGY    CT Chest WO Contrast   Final Result   Moderate multifocal airspace opacity involving all segments of both   lungs-moderately worse over the past 2 weeks. Multifocal pneumonia is   favored. Asymmetric pulmonary edema superimposed upon COPD possible but less   likely. Atypical pneumonia might be considered clinically. RECOMMENDATIONS:   Recommend appropriate treatment for pneumonia with follow-up chest x-ray in   2-4 weeks. Follow-up noncontrast CT scan of the chest in 6 months may be   necessary to document complete resolution. XR CHEST PORTABLE   Final Result   No acute cardiopulmonary disease. Stable coarsening of the interstitial   markings throughout the lungs suggesting an underlying pulmonary fibrosis.              Discharge Medications     Medication List      START taking these medications    sulfamethoxazole-trimethoprim 800-160 MG per tablet  Commonly known as:  BACTRIM DS  Take 2 tablets by mouth 2 times daily for 6 days        CHANGE how you take these medications    albuterol sulfate  (90 Base) MCG/ACT inhaler  Inhale 2 puffs into the lungs 4 times daily as needed for Wheezing  What changed:  Another medication with the same name was removed. Continue taking this medication, and follow the directions you see here. DYMISTA 137-50 MCG/ACT Susp  Generic drug:  Azelastine-Fluticasone  What changed:  Another medication with the same name was removed. Continue taking this medication, and follow the directions you see here. CONTINUE taking these medications    acetaminophen 500 MG tablet  Commonly known as:  TYLENOL     amitriptyline 25 MG tablet  Commonly known as:  ELAVIL  Take 1 tablet by mouth nightly     aspirin 81 MG chewable tablet  Take 1 tablet by mouth daily.      furosemide 20 MG tablet  Commonly known as:  LASIX  Take 1 tablet by mouth daily     hydrOXYzine 10 MG tablet  Commonly known as:  ATARAX     ipratropium-albuterol 0.5-2.5 (3) MG/3ML Soln nebulizer solution  Commonly known as:  DUONEB  Inhale 3 mLs into the lungs every 6 hours as needed for Shortness of Breath     ketorolac 10 MG tablet  Commonly known as:  TORADOL  Take 1 tablet by mouth every 6 hours as needed for Pain (migraines)     levothyroxine 150 MCG tablet  Commonly known as:  SYNTHROID  TAKE 1 TABLET BY MOUTH EVERY DAY     LORazepam 0.5 MG tablet  Commonly known as:  ATIVAN     mirtazapine 30 MG tablet  Commonly known as:  REMERON  TAKE 1 TABLET BY MOUTH EVERY DAY AT NIGHT     nadolol 20 MG tablet  Commonly known as:  CORGARD  TAKE 2 TABLETS BY MOUTH EVERY DAY     NEXIUM 24HR PO     OXYGEN     potassium chloride 10 MEQ extended release tablet  Commonly known as:  KLOR-CON M  Take 1 tablet by mouth daily     sertraline 100 MG tablet  Commonly known as:  ZOLOFT  Take 2 tablets by mouth daily     traZODone 50 MG tablet  Commonly known as:  DESYREL  TAKE 1 TABLET BY MOUTH NIGHTLY        STOP taking these medications    predniSONE 10 MG tablet  Commonly known as:  DELTASONE           Where to Get Your Medications      These medications were sent to [facility]/pharmacy #[id number]- [place], [place] - [address] - P [phone] - F [phone]  [address] [place] [place] [address]    Phone:  [phone]   · sulfamethoxazole-trimethoprim 800-160 MG per tablet       Discharged in stable condition to Home     Follow Up:   Follow up with PCP in 1 week, CXR in 4-6 weeks         Armand Pruitt MD 6/21/2019 11:08 AM

## 2019-06-20 NOTE — PROGRESS NOTES
Pt d/c'd home with . Removed peripheral IV and stopped bleeding. Catheter intact. Pt tolerated well. No redness noted at site. Notified [facility] and removed tele box. Reviewed d/c instructions, home meds, and  f/u information utilizing teach-back method. Scripts for medications sent to pharmacy. Patient verbalized understanding.

## 2019-06-21 ENCOUNTER — CARE COORDINATION (OUTPATIENT)
Dept: CASE MANAGEMENT | Age: 64
End: 2019-06-21

## 2019-06-21 NOTE — CARE COORDINATION
Emil 45 Transitions Initial Follow Up Call    Call within 2 business days of discharge: Yes    Patient: Peterson Alberto Patient : 1955   MRN: [id number]  Reason for Admission: resp failure / PNA  Discharge Date: 19 RARS: Readmission Risk Score: 12      Last Discharge [facility]       Complaint Diagnosis Description Type Department Provider    19 URI Acute respiratory failure with hypoxia ([address] . .. ED to Hosp-Admission (Discharged) (ADMITTED) Adalid Emerson MD; Sourav Chaudhry. .. Spoke with: Peterson Alberto (patient)    Facility: [facility]    Non-face-to-face services provided:  Obtained and reviewed discharge summary and/or continuity of care documents  Education of patient/family/caregiver/guardian to support self-management-s/s to report; energy conservation techniques  Assessment and support for treatment adherence and medication management-med review complete    Care Transitions 24 Hour Call    Schedule Follow Up Appointment with PCP:  Completed  Do you have any ongoing symptoms?:  No  Do you have a copy of your discharge instructions?:  Yes  Do you have all of your prescriptions and are they filled?:  Yes  Have you been contacted by a [address]?:  No  Have you scheduled your follow up appointment?:  Yes  How are you going to get to your appointment?:  Car - drive self  Were you discharged with any Home Care or Post Acute Services:  No  Patient Home Equipment:  Oxygen, Nebulizer  Do you have support at home?:  Partner/Spouse/SO  Do you feel like you have everything you need to keep you well at home?:  Yes  Are you an active caregiver in your home?:  No  Care Transitions Interventions     Other Services:  Vianca Reyes returned call. Reports taking new abx as prescribed. Stopped prednisone. Declined full med review. Was hard to keep on task, she is a storyteller. Reports she had one febrile episode last night 102 degrees.  Took 2 tylenol and it did not recur. She feels great today. Wearing oxygen 2lpm and SaO2 93%. She monitors on home pulse oximeter and knows goal >92%. Has PCP appt as below. Reviewed s/s to report. Declines HC or other needs. Encouraged her to call if any concerns/questions/needs. She confirms she has my number nearby. Care transition following.      Follow Up  Future Appointments   Date Time Provider Calvin Mcmullen   7/10/2019 11:30 AM Garret Cochran MD [facility]   7/17/2019  8:15 AM Ike Fried MD [facility]       Traci Sin RN

## 2019-06-21 NOTE — CARE COORDINATION
Emil 45 Transitions Initial Follow Up Call    Call within 2 business days of discharge: Yes    Patient: Nico Parham Patient : 1955   MRN: [id number]  Reason for Admission: respiratory failure  PNA  Discharge Date: 19 RARS: Readmission Risk Score: 12      Last Discharge [facility]       Complaint Diagnosis Description Type Department Provider    19 URI Acute respiratory failure with hypoxia ([address] . .. ED to Hosp-Admission (Discharged) (ADMITTED) Maico Rea MD; Milana Dennis. .. Facility: [facility]    1st attempt to reach patient for post 24h discharge care transition call. Message left stating purpose of call, contact information and request for return call.      Follow Up  Future Appointments   Date Time Provider Calvin Mcmullen   7/10/2019 11:30 AM Bert Birch MD [facility]   2019  8:15 AM Vishal Baker MD [facility]       Leydi Cardenas RN

## 2019-06-22 LAB
BLOOD CULTURE, ROUTINE: NORMAL
CULTURE, BLOOD 2: NORMAL

## 2019-06-26 ENCOUNTER — APPOINTMENT (OUTPATIENT)
Dept: GENERAL RADIOLOGY | Age: 64
DRG: 177 | End: 2019-06-26
Payer: COMMERCIAL

## 2019-06-26 ENCOUNTER — TELEPHONE (OUTPATIENT)
Dept: OTHER | Facility: CLINIC | Age: 64
End: 2019-06-26

## 2019-06-26 ENCOUNTER — HOSPITAL ENCOUNTER (INPATIENT)
Age: 64
LOS: 7 days | Discharge: HOME HEALTH | DRG: 177 | End: 2019-07-03
Attending: EMERGENCY MEDICINE | Admitting: INTERNAL MEDICINE
Payer: COMMERCIAL

## 2019-06-26 DIAGNOSIS — J18.9 ATYPICAL PNEUMONIA: ICD-10-CM

## 2019-06-26 DIAGNOSIS — J20.9 ACUTE BRONCHITIS WITH BRONCHOSPASM: Primary | ICD-10-CM

## 2019-06-26 LAB
A/G RATIO: 0.6 (ref 1.1–2.2)
ALBUMIN SERPL-MCNC: 2.9 G/DL (ref 3.4–5)
ALP BLD-CCNC: 129 U/L (ref 40–129)
ALT SERPL-CCNC: 13 U/L (ref 10–40)
ANION GAP SERPL CALCULATED.3IONS-SCNC: 11 MMOL/L (ref 3–16)
ANISOCYTOSIS: ABNORMAL
AST SERPL-CCNC: 58 U/L (ref 15–37)
BASE EXCESS ARTERIAL: -2.9 MMOL/L (ref -3–3)
BASOPHILS ABSOLUTE: 0 K/UL (ref 0–0.2)
BASOPHILS RELATIVE PERCENT: 0 %
BILIRUB SERPL-MCNC: 0.6 MG/DL (ref 0–1)
BUN BLDV-MCNC: 7 MG/DL (ref 7–20)
CALCIUM SERPL-MCNC: 9.2 MG/DL (ref 8.3–10.6)
CARBOXYHEMOGLOBIN ARTERIAL: 0.8 % (ref 0–1.5)
CHLORIDE BLD-SCNC: 106 MMOL/L (ref 99–110)
CO2: 19 MMOL/L (ref 21–32)
CREAT SERPL-MCNC: 0.7 MG/DL (ref 0.6–1.2)
EKG ATRIAL RATE: 84 BPM
EKG DIAGNOSIS: NORMAL
EKG P AXIS: 41 DEGREES
EKG P-R INTERVAL: 160 MS
EKG Q-T INTERVAL: 378 MS
EKG QRS DURATION: 76 MS
EKG QTC CALCULATION (BAZETT): 446 MS
EKG R AXIS: 31 DEGREES
EKG T AXIS: 40 DEGREES
EKG VENTRICULAR RATE: 84 BPM
EOSINOPHILS ABSOLUTE: 0.4 K/UL (ref 0–0.6)
EOSINOPHILS RELATIVE PERCENT: 4 %
GFR AFRICAN AMERICAN: >60
GFR NON-AFRICAN AMERICAN: >60
GLOBULIN: 4.6 G/DL
GLUCOSE BLD-MCNC: 144 MG/DL (ref 70–99)
HCO3 ARTERIAL: 20.4 MMOL/L (ref 21–29)
HCT VFR BLD CALC: 34.9 % (ref 36–48)
HEMOGLOBIN, ART, EXTENDED: 11.3 G/DL (ref 12–16)
HEMOGLOBIN: 11.2 G/DL (ref 12–16)
INR BLD: 1.42 (ref 0.86–1.14)
LACTIC ACID: 1.6 MMOL/L (ref 0.4–2)
LYMPHOCYTES ABSOLUTE: 1.6 K/UL (ref 1–5.1)
LYMPHOCYTES RELATIVE PERCENT: 18 %
MCH RBC QN AUTO: 27.3 PG (ref 26–34)
MCHC RBC AUTO-ENTMCNC: 32.2 G/DL (ref 31–36)
MCV RBC AUTO: 84.7 FL (ref 80–100)
METHEMOGLOBIN ARTERIAL: 0.2 %
MONOCYTES ABSOLUTE: 0.7 K/UL (ref 0–1.3)
MONOCYTES RELATIVE PERCENT: 8 %
NEUTROPHILS ABSOLUTE: 6.3 K/UL (ref 1.7–7.7)
NEUTROPHILS RELATIVE PERCENT: 70 %
O2 CONTENT ARTERIAL: 14 ML/DL
O2 SAT, ARTERIAL: 90.6 %
O2 THERAPY: ABNORMAL
PCO2 ARTERIAL: 30.8 MMHG (ref 35–45)
PDW BLD-RTO: 16.4 % (ref 12.4–15.4)
PH ARTERIAL: 7.44 (ref 7.35–7.45)
PLATELET # BLD: 197 K/UL (ref 135–450)
PLATELET SLIDE REVIEW: ADEQUATE
PMV BLD AUTO: 7.9 FL (ref 5–10.5)
PO2 ARTERIAL: 55.9 MMHG (ref 75–108)
POLYCHROMASIA: ABNORMAL
POTASSIUM SERPL-SCNC: 5.4 MMOL/L (ref 3.5–5.1)
PRO-BNP: 253 PG/ML (ref 0–124)
PROCALCITONIN: 0.16 NG/ML (ref 0–0.15)
PROTHROMBIN TIME: 16 SEC (ref 9.8–13)
RBC # BLD: 4.11 M/UL (ref 4–5.2)
SLIDE REVIEW: ABNORMAL
SODIUM BLD-SCNC: 136 MMOL/L (ref 136–145)
TCO2 ARTERIAL: 21.4 MMOL/L
TOTAL PROTEIN: 7.5 G/DL (ref 6.4–8.2)
TROPONIN: <0.01 NG/ML
WBC # BLD: 9 K/UL (ref 4–11)

## 2019-06-26 PROCEDURE — 6370000000 HC RX 637 (ALT 250 FOR IP): Performed by: INTERNAL MEDICINE

## 2019-06-26 PROCEDURE — 36600 WITHDRAWAL OF ARTERIAL BLOOD: CPT

## 2019-06-26 PROCEDURE — 99222 1ST HOSP IP/OBS MODERATE 55: CPT | Performed by: PHYSICIAN ASSISTANT

## 2019-06-26 PROCEDURE — 99284 EMERGENCY DEPT VISIT MOD MDM: CPT

## 2019-06-26 PROCEDURE — 6370000000 HC RX 637 (ALT 250 FOR IP): Performed by: PHYSICIAN ASSISTANT

## 2019-06-26 PROCEDURE — 85025 COMPLETE CBC W/AUTO DIFF WBC: CPT

## 2019-06-26 PROCEDURE — 87070 CULTURE OTHR SPECIMN AEROBIC: CPT

## 2019-06-26 PROCEDURE — 2580000003 HC RX 258: Performed by: PHYSICIAN ASSISTANT

## 2019-06-26 PROCEDURE — 94761 N-INVAS EAR/PLS OXIMETRY MLT: CPT

## 2019-06-26 PROCEDURE — 71046 X-RAY EXAM CHEST 2 VIEWS: CPT

## 2019-06-26 PROCEDURE — 6360000002 HC RX W HCPCS: Performed by: EMERGENCY MEDICINE

## 2019-06-26 PROCEDURE — 84484 ASSAY OF TROPONIN QUANT: CPT

## 2019-06-26 PROCEDURE — 6360000002 HC RX W HCPCS: Performed by: PHYSICIAN ASSISTANT

## 2019-06-26 PROCEDURE — 84145 PROCALCITONIN (PCT): CPT

## 2019-06-26 PROCEDURE — 96374 THER/PROPH/DIAG INJ IV PUSH: CPT

## 2019-06-26 PROCEDURE — 82803 BLOOD GASES ANY COMBINATION: CPT

## 2019-06-26 PROCEDURE — 83880 ASSAY OF NATRIURETIC PEPTIDE: CPT

## 2019-06-26 PROCEDURE — 36415 COLL VENOUS BLD VENIPUNCTURE: CPT

## 2019-06-26 PROCEDURE — 87040 BLOOD CULTURE FOR BACTERIA: CPT

## 2019-06-26 PROCEDURE — 94640 AIRWAY INHALATION TREATMENT: CPT

## 2019-06-26 PROCEDURE — 93005 ELECTROCARDIOGRAM TRACING: CPT | Performed by: EMERGENCY MEDICINE

## 2019-06-26 PROCEDURE — 93010 ELECTROCARDIOGRAM REPORT: CPT | Performed by: INTERNAL MEDICINE

## 2019-06-26 PROCEDURE — 80053 COMPREHEN METABOLIC PANEL: CPT

## 2019-06-26 PROCEDURE — 2700000000 HC OXYGEN THERAPY PER DAY

## 2019-06-26 PROCEDURE — 83605 ASSAY OF LACTIC ACID: CPT

## 2019-06-26 PROCEDURE — 1200000000 HC SEMI PRIVATE

## 2019-06-26 PROCEDURE — 87205 SMEAR GRAM STAIN: CPT

## 2019-06-26 PROCEDURE — 6370000000 HC RX 637 (ALT 250 FOR IP): Performed by: EMERGENCY MEDICINE

## 2019-06-26 PROCEDURE — 85610 PROTHROMBIN TIME: CPT

## 2019-06-26 RX ORDER — IPRATROPIUM BROMIDE AND ALBUTEROL SULFATE 2.5; .5 MG/3ML; MG/3ML
1 SOLUTION RESPIRATORY (INHALATION) 3 TIMES DAILY
Status: DISCONTINUED | OUTPATIENT
Start: 2019-06-26 | End: 2019-06-27

## 2019-06-26 RX ORDER — ACETAMINOPHEN 325 MG/1
650 TABLET ORAL EVERY 4 HOURS PRN
Status: DISCONTINUED | OUTPATIENT
Start: 2019-06-26 | End: 2019-07-03 | Stop reason: HOSPADM

## 2019-06-26 RX ORDER — SODIUM CHLORIDE 9 MG/ML
INJECTION, SOLUTION INTRAVENOUS CONTINUOUS
Status: DISCONTINUED | OUTPATIENT
Start: 2019-06-26 | End: 2019-06-27

## 2019-06-26 RX ORDER — PHYTONADIONE 5 MG/1
5 TABLET ORAL ONCE
Status: COMPLETED | OUTPATIENT
Start: 2019-06-26 | End: 2019-06-26

## 2019-06-26 RX ORDER — ALBUTEROL SULFATE 90 UG/1
2 AEROSOL, METERED RESPIRATORY (INHALATION) 4 TIMES DAILY PRN
Status: DISCONTINUED | OUTPATIENT
Start: 2019-06-26 | End: 2019-07-03 | Stop reason: HOSPADM

## 2019-06-26 RX ORDER — FUROSEMIDE 20 MG/1
20 TABLET ORAL DAILY
Status: DISCONTINUED | OUTPATIENT
Start: 2019-06-27 | End: 2019-06-30

## 2019-06-26 RX ORDER — LEVOTHYROXINE SODIUM 0.15 MG/1
150 TABLET ORAL
Status: DISCONTINUED | OUTPATIENT
Start: 2019-06-27 | End: 2019-07-03 | Stop reason: HOSPADM

## 2019-06-26 RX ORDER — ASPIRIN 81 MG/1
81 TABLET, CHEWABLE ORAL DAILY
Status: DISCONTINUED | OUTPATIENT
Start: 2019-06-27 | End: 2019-07-03 | Stop reason: HOSPADM

## 2019-06-26 RX ORDER — IPRATROPIUM BROMIDE AND ALBUTEROL SULFATE 2.5; .5 MG/3ML; MG/3ML
1 SOLUTION RESPIRATORY (INHALATION)
Status: DISCONTINUED | OUTPATIENT
Start: 2019-06-26 | End: 2019-06-26

## 2019-06-26 RX ORDER — SERTRALINE HYDROCHLORIDE 100 MG/1
200 TABLET, FILM COATED ORAL DAILY
Status: DISCONTINUED | OUTPATIENT
Start: 2019-06-27 | End: 2019-07-03 | Stop reason: HOSPADM

## 2019-06-26 RX ORDER — LORAZEPAM 0.5 MG/1
0.5 TABLET ORAL EVERY 6 HOURS PRN
Status: DISCONTINUED | OUTPATIENT
Start: 2019-06-26 | End: 2019-07-03 | Stop reason: HOSPADM

## 2019-06-26 RX ORDER — ONDANSETRON 2 MG/ML
4 INJECTION INTRAMUSCULAR; INTRAVENOUS EVERY 6 HOURS PRN
Status: DISCONTINUED | OUTPATIENT
Start: 2019-06-26 | End: 2019-07-03 | Stop reason: HOSPADM

## 2019-06-26 RX ORDER — NADOLOL 40 MG/1
40 TABLET ORAL NIGHTLY
Status: DISCONTINUED | OUTPATIENT
Start: 2019-06-26 | End: 2019-07-03 | Stop reason: HOSPADM

## 2019-06-26 RX ORDER — METHYLPREDNISOLONE SODIUM SUCCINATE 125 MG/2ML
125 INJECTION, POWDER, LYOPHILIZED, FOR SOLUTION INTRAMUSCULAR; INTRAVENOUS ONCE
Status: COMPLETED | OUTPATIENT
Start: 2019-06-26 | End: 2019-06-26

## 2019-06-26 RX ORDER — NADOLOL 40 MG/1
40 TABLET ORAL DAILY
Status: DISCONTINUED | OUTPATIENT
Start: 2019-06-26 | End: 2019-06-26

## 2019-06-26 RX ORDER — MIRTAZAPINE 30 MG/1
30 TABLET, FILM COATED ORAL NIGHTLY
Status: DISCONTINUED | OUTPATIENT
Start: 2019-06-26 | End: 2019-07-03 | Stop reason: HOSPADM

## 2019-06-26 RX ORDER — SODIUM CHLORIDE 0.9 % (FLUSH) 0.9 %
10 SYRINGE (ML) INJECTION PRN
Status: DISCONTINUED | OUTPATIENT
Start: 2019-06-26 | End: 2019-07-03 | Stop reason: HOSPADM

## 2019-06-26 RX ORDER — AMITRIPTYLINE HYDROCHLORIDE 25 MG/1
25 TABLET, FILM COATED ORAL NIGHTLY
Status: DISCONTINUED | OUTPATIENT
Start: 2019-06-26 | End: 2019-07-03 | Stop reason: HOSPADM

## 2019-06-26 RX ORDER — NADOLOL 40 MG/1
40 TABLET ORAL DAILY
Status: DISCONTINUED | OUTPATIENT
Start: 2019-06-27 | End: 2019-06-26

## 2019-06-26 RX ORDER — SODIUM CHLORIDE 0.9 % (FLUSH) 0.9 %
10 SYRINGE (ML) INJECTION EVERY 12 HOURS SCHEDULED
Status: DISCONTINUED | OUTPATIENT
Start: 2019-06-26 | End: 2019-07-03 | Stop reason: HOSPADM

## 2019-06-26 RX ORDER — TRAZODONE HYDROCHLORIDE 50 MG/1
50 TABLET ORAL NIGHTLY
Status: DISCONTINUED | OUTPATIENT
Start: 2019-06-26 | End: 2019-07-03 | Stop reason: HOSPADM

## 2019-06-26 RX ORDER — IPRATROPIUM BROMIDE AND ALBUTEROL SULFATE 2.5; .5 MG/3ML; MG/3ML
1 SOLUTION RESPIRATORY (INHALATION) ONCE
Status: COMPLETED | OUTPATIENT
Start: 2019-06-26 | End: 2019-06-26

## 2019-06-26 RX ADMIN — ENOXAPARIN SODIUM 40 MG: 100 INJECTION SUBCUTANEOUS at 16:35

## 2019-06-26 RX ADMIN — IPRATROPIUM BROMIDE AND ALBUTEROL SULFATE 1 AMPULE: .5; 3 SOLUTION RESPIRATORY (INHALATION) at 18:50

## 2019-06-26 RX ADMIN — METHYLPREDNISOLONE SODIUM SUCCINATE 125 MG: 125 INJECTION, POWDER, FOR SOLUTION INTRAMUSCULAR; INTRAVENOUS at 09:57

## 2019-06-26 RX ADMIN — PHYTONADIONE 5 MG: 5 TABLET ORAL at 21:50

## 2019-06-26 RX ADMIN — MIRTAZAPINE 30 MG: 30 TABLET, FILM COATED ORAL at 21:50

## 2019-06-26 RX ADMIN — TRAZODONE HYDROCHLORIDE 50 MG: 50 TABLET ORAL at 21:50

## 2019-06-26 RX ADMIN — SODIUM CHLORIDE: 9 INJECTION, SOLUTION INTRAVENOUS at 15:58

## 2019-06-26 RX ADMIN — MEROPENEM 1 G: 1 INJECTION, POWDER, FOR SOLUTION INTRAVENOUS at 16:30

## 2019-06-26 RX ADMIN — AMITRIPTYLINE HYDROCHLORIDE 25 MG: 25 TABLET, FILM COATED ORAL at 21:50

## 2019-06-26 RX ADMIN — VANCOMYCIN HYDROCHLORIDE 1250 MG: 10 INJECTION, POWDER, LYOPHILIZED, FOR SOLUTION INTRAVENOUS at 18:31

## 2019-06-26 RX ADMIN — NADOLOL 40 MG: 40 TABLET ORAL at 21:50

## 2019-06-26 RX ADMIN — IPRATROPIUM BROMIDE AND ALBUTEROL SULFATE 1 AMPULE: .5; 3 SOLUTION RESPIRATORY (INHALATION) at 09:56

## 2019-06-26 ASSESSMENT — ENCOUNTER SYMPTOMS
SINUS PRESSURE: 0
WHEEZING: 1
SINUS PAIN: 0
ABDOMINAL PAIN: 0
RHINORRHEA: 0
SHORTNESS OF BREATH: 1
ABDOMINAL DISTENTION: 0
COUGH: 1

## 2019-06-26 ASSESSMENT — PAIN DESCRIPTION - DESCRIPTORS: DESCRIPTORS: ACHING

## 2019-06-26 ASSESSMENT — PAIN SCALES - GENERAL
PAINLEVEL_OUTOF10: 4
PAINLEVEL_OUTOF10: 0

## 2019-06-26 ASSESSMENT — PAIN DESCRIPTION - LOCATION: LOCATION: CHEST

## 2019-06-26 ASSESSMENT — PAIN DESCRIPTION - PAIN TYPE: TYPE: ACUTE PAIN

## 2019-06-26 ASSESSMENT — PAIN DESCRIPTION - ORIENTATION: ORIENTATION: RIGHT;LEFT

## 2019-06-26 NOTE — ED NOTES
To Radiology and returned to room via wheelchair without issue.      Sanchez Wilkerson RN  06/26/19 4826

## 2019-06-26 NOTE — ED NOTES
DR ESTRADA CALLED, HE IS OUT OF TOWN AND DR CALABRESE WILL BE RETURNING CALL.      Mari Tate RN  06/26/19 9053

## 2019-06-26 NOTE — PROGRESS NOTES
4 Eyes Skin Assessment     The patient is being assess for   Admission    I agree that 2 RN's have performed a thorough Head to Toe Skin Assessment on the patient. ALL assessment sites listed below have been assessed. Areas assessed by both nurses:   [x]   Head, Face, and Ears   [x]   Shoulders, Back, and Chest, Abdomen  [x]   Arms, Elbows, and Hands   [x]   Coccyx, Sacrum, and Ischium  [x]   Legs, Feet, and Heels   Rash like redness areas to bilateral groin and under both breasts. Scratch to right leg aprox 1 inch, healing. Bruising to bilateral arms, scattered    **SHARE this note so that the co-signing nurse is able to place an eSignature**    Co-signer eSignature:Taurus BARTON 6/26/19 @ 0038    Does the Patient have Skin Breakdown?   No          Amos Prevention initiated:  No   Wound Care Orders initiated:  No      [facility] nurse consulted for Pressure Injury (Stage 3,4, Unstageable, DTI, NWPT, Complex wounds)and New or Established Ostomies:  No      Primary Nurse eSignature: Electronically signed by Eveline London RN on 6/26/19 at 3:44 PM

## 2019-06-26 NOTE — ED PROVIDER NOTES
SYSTEM PROVIDED HISTORY: shortness of breath, cough, fever TECHNOLOGIST PROVIDED HISTORY: Ordering Physician Provided Reason for Exam: shortness of breath, cough, fever Acuity: Acute Type of Exam: Initial FINDINGS: Mediastinum: Evaluation of the mediastinal structures somewhat limited without intravenous contrast.  There are mildly prominent lymph nodes within the right paratracheal region, aortopulmonic window, and subcarinal regions. These are similar to recent exams. Cardiac size upper limits of normal.  No pericardial effusion. Minimal calcification noted within the thoracic aorta. Lungs/pleura: Moderate patchy airspace opacity noted throughout all segments of both lungs-worse when compared to May 30, 2019. Central airways are patent. No pleural effusion or pneumothorax. Upper Abdomen: Images through the upper abdomen are unremarkable on this noncontrast exam. Soft Tissues/Bones: No acute findings. Moderate multifocal airspace opacity involving all segments of both lungs-moderately worse over the past 2 weeks. Multifocal pneumonia is favored. Asymmetric pulmonary edema superimposed upon COPD possible but less likely. Atypical pneumonia might be considered clinically. RECOMMENDATIONS: Recommend appropriate treatment for pneumonia with follow-up chest x-ray in 2-4 weeks. Follow-up noncontrast CT scan of the chest in 6 months may be necessary to document complete resolution. Xr Chest Portable    Result Date: 6/16/2019  EXAMINATION: ONE XRAY VIEW OF THE CHEST 6/16/2019 1:31 pm COMPARISON: 06/04/2019. HISTORY: ORDERING SYSTEM PROVIDED HISTORY: cough, sob, fever TECHNOLOGIST PROVIDED HISTORY: Reason for exam:->cough, sob, fever Ordering Physician Provided Reason for Exam: cough, sob, fever Acuity: Acute Type of Exam: Initial FINDINGS: The lungs are rated in lung volumes are diminished. The cardiomediastinal silhouette is stable. Aortic vascular calcification.   Coarsening of the interstitial markings bilateral hilar lymphadenopathy, similar to the study of 03/08/2019. There is atherosclerotic disease of the thoracic aorta, without evidence of aneurysm or dissection. There is stable mild four-chamber cardiac enlargement. No pericardial abnormality is identified. Lungs/pleura: The central airways are patent. There has been interval increase in amount of diffuse ground-glass opacity throughout both lungs in comparison with the prior studies of 03/2019, which likely represents an element of atelectasis or less likely asymmetric edema. There are stable curvilinear bands of parenchymal scar within the right middle lobe and lingula. There are stable scattered subpleural curvilinear irregular opacities throughout both lungs, most notably within the bilateral lower lobes, which likely reflects an element of chronic interstitial pulmonary fibrosis. No localized focus of acute dense airspace consolidation is evident to suggest pneumonia. There is no evidence of a pneumothorax or pleural effusion. No suspicious pulmonary nodule or mass is identified. Upper Abdomen: There is chronic diffuse hepatic steatosis. The remainder of the upper abdomen is unremarkable. Soft Tissues/Bones: There is mild degenerative change throughout the thoracic spine. No osteolytic or osteoblastic lesion is seen. 1. No CT evidence of a pulmonary embolism. 2. No acute abnormality of the thoracic aorta. 3. New scattered nonspecific ground-glass opacity throughout both lungs, increased from the studies of 03/2019. Most likely considerations include either atelectasis or developing asymmetric pulmonary edema. 4. Stable mild chronic interstitial pulmonary fibrosis. 5. Stable mild mediastinal and bilateral hilar lymphadenopathy. 6. Stable cardiomegaly. 7. Diffuse hepatic steatosis. New Prescriptions    No medications on file       1. Acute bronchitis with bronchospasm    2.  Atypical pneumonia      I discussed the case in detail with

## 2019-06-26 NOTE — ED NOTES
Report called to [facility], RN at [facility].      Lydia Patricio RN  06/26/19 5015

## 2019-06-27 ENCOUNTER — ANESTHESIA EVENT (OUTPATIENT)
Dept: ENDOSCOPY | Age: 64
DRG: 177 | End: 2019-06-27
Payer: COMMERCIAL

## 2019-06-27 ENCOUNTER — APPOINTMENT (OUTPATIENT)
Dept: GENERAL RADIOLOGY | Age: 64
DRG: 177 | End: 2019-06-27
Payer: COMMERCIAL

## 2019-06-27 ENCOUNTER — ANESTHESIA (OUTPATIENT)
Dept: ENDOSCOPY | Age: 64
DRG: 177 | End: 2019-06-27
Payer: COMMERCIAL

## 2019-06-27 VITALS
DIASTOLIC BLOOD PRESSURE: 130 MMHG | OXYGEN SATURATION: 95 % | RESPIRATION RATE: 13 BRPM | SYSTOLIC BLOOD PRESSURE: 207 MMHG

## 2019-06-27 LAB
ANION GAP SERPL CALCULATED.3IONS-SCNC: 15 MMOL/L (ref 3–16)
ANTI-NUCLEAR ANTIBODY (ANA): NEGATIVE
APPEARANCE BAL (LAVAGE): ABNORMAL
BASOPHILS ABSOLUTE: 0.1 K/UL (ref 0–0.2)
BASOPHILS RELATIVE PERCENT: 0.8 %
BASOPHILS, BAL FLUID: 1 %
BUN BLDV-MCNC: 8 MG/DL (ref 7–20)
C-REACTIVE PROTEIN: 56.9 MG/L (ref 0–5.1)
CALCIUM SERPL-MCNC: 8.4 MG/DL (ref 8.3–10.6)
CHLORIDE BLD-SCNC: 106 MMOL/L (ref 99–110)
CLOT EVALUATION BAL: ABNORMAL
CO2: 18 MMOL/L (ref 21–32)
COLOR LAVAGE: ABNORMAL
CREAT SERPL-MCNC: 0.6 MG/DL (ref 0.6–1.2)
EOSIN: 6 %
EOSINOPHILS ABSOLUTE: 0 K/UL (ref 0–0.6)
EOSINOPHILS RELATIVE PERCENT: 0.4 %
EPITHELIAL CELLS FLUID: 3 %
GFR AFRICAN AMERICAN: >60
GFR NON-AFRICAN AMERICAN: >60
GLUCOSE BLD-MCNC: 115 MG/DL (ref 70–99)
HCT VFR BLD CALC: 34.7 % (ref 36–48)
HEMOGLOBIN: 10.8 G/DL (ref 12–16)
IGA: 580 MG/DL (ref 70–400)
IGG: 1310 MG/DL (ref 700–1600)
IGM: 251 MG/DL (ref 40–230)
INR BLD: 1.38 (ref 0.86–1.14)
LYMPHOCYTES ABSOLUTE: 2.1 K/UL (ref 1–5.1)
LYMPHOCYTES RELATIVE PERCENT: 25.7 %
LYMPHOCYTES, BAL: 23 % (ref 5–10)
MACROPHAGES, BAL: 25 % (ref 90–95)
MCH RBC QN AUTO: 28.1 PG (ref 26–34)
MCHC RBC AUTO-ENTMCNC: 31 G/DL (ref 31–36)
MCV RBC AUTO: 90.5 FL (ref 80–100)
MONOCYTES ABSOLUTE: 0.6 K/UL (ref 0–1.3)
MONOCYTES RELATIVE PERCENT: 7.2 %
MONOCYTES, BAL: 1 %
NEUTROPHILS ABSOLUTE: 5.5 K/UL (ref 1.7–7.7)
NEUTROPHILS RELATIVE PERCENT: 65.9 %
NUMBER OF CELLS COUNTED BAL (LAVAGE): 200
PDW BLD-RTO: 17.1 % (ref 12.4–15.4)
PLATELET # BLD: 189 K/UL (ref 135–450)
PMV BLD AUTO: 7.5 FL (ref 5–10.5)
POTASSIUM REFLEX MAGNESIUM: 3.7 MMOL/L (ref 3.5–5.1)
PROTHROMBIN TIME: 15.7 SEC (ref 9.8–13)
RBC # BLD: 3.84 M/UL (ref 4–5.2)
RBC, BAL: ABNORMAL /CUMM
SEGMENTED NEUTROPHILS, BAL: 41 % (ref 5–10)
SODIUM BLD-SCNC: 139 MMOL/L (ref 136–145)
WBC # BLD: 8.3 K/UL (ref 4–11)
WBC/EPI CELLS BAL: 149 /CUMM

## 2019-06-27 PROCEDURE — 89051 BODY FLUID CELL COUNT: CPT

## 2019-06-27 PROCEDURE — 3609010800 HC BRONCHOSCOPY ALVEOLAR LAVAGE: Performed by: INTERNAL MEDICINE

## 2019-06-27 PROCEDURE — 88177 CYTP FNA EVAL EA ADDL: CPT

## 2019-06-27 PROCEDURE — 6370000000 HC RX 637 (ALT 250 FOR IP): Performed by: INTERNAL MEDICINE

## 2019-06-27 PROCEDURE — 6360000002 HC RX W HCPCS: Performed by: INTERNAL MEDICINE

## 2019-06-27 PROCEDURE — 87206 SMEAR FLUORESCENT/ACID STAI: CPT

## 2019-06-27 PROCEDURE — 82784 ASSAY IGA/IGD/IGG/IGM EACH: CPT

## 2019-06-27 PROCEDURE — 3609011800 HC BRONCHOSCOPY/TRANSBRONCHIAL LUNG BIOPSY: Performed by: INTERNAL MEDICINE

## 2019-06-27 PROCEDURE — 6370000000 HC RX 637 (ALT 250 FOR IP): Performed by: PHYSICIAN ASSISTANT

## 2019-06-27 PROCEDURE — 80048 BASIC METABOLIC PNL TOTAL CA: CPT

## 2019-06-27 PROCEDURE — 2580000003 HC RX 258: Performed by: PHYSICIAN ASSISTANT

## 2019-06-27 PROCEDURE — 3609011900 HC BRONCHOSCOPY NEEDLE BX TRACHEA MAIN STEM&/BRON: Performed by: INTERNAL MEDICINE

## 2019-06-27 PROCEDURE — 99232 SBSQ HOSP IP/OBS MODERATE 35: CPT | Performed by: INTERNAL MEDICINE

## 2019-06-27 PROCEDURE — 94640 AIRWAY INHALATION TREATMENT: CPT

## 2019-06-27 PROCEDURE — 07D78ZX EXTRACTION OF THORAX LYMPHATIC, VIA NATURAL OR ARTIFICIAL OPENING ENDOSCOPIC, DIAGNOSTIC: ICD-10-PCS | Performed by: INTERNAL MEDICINE

## 2019-06-27 PROCEDURE — 87116 MYCOBACTERIA CULTURE: CPT

## 2019-06-27 PROCEDURE — 85610 PROTHROMBIN TIME: CPT

## 2019-06-27 PROCEDURE — 86038 ANTINUCLEAR ANTIBODIES: CPT

## 2019-06-27 PROCEDURE — 87070 CULTURE OTHR SPECIMN AEROBIC: CPT

## 2019-06-27 PROCEDURE — 2500000003 HC RX 250 WO HCPCS: Performed by: NURSE ANESTHETIST, CERTIFIED REGISTERED

## 2019-06-27 PROCEDURE — 88112 CYTOPATH CELL ENHANCE TECH: CPT

## 2019-06-27 PROCEDURE — 6360000002 HC RX W HCPCS: Performed by: PHYSICIAN ASSISTANT

## 2019-06-27 PROCEDURE — 36415 COLL VENOUS BLD VENIPUNCTURE: CPT

## 2019-06-27 PROCEDURE — 2000000000 HC ICU R&B

## 2019-06-27 PROCEDURE — 7100000011 HC PHASE II RECOVERY - ADDTL 15 MIN: Performed by: INTERNAL MEDICINE

## 2019-06-27 PROCEDURE — 3700000000 HC ANESTHESIA ATTENDED CARE: Performed by: INTERNAL MEDICINE

## 2019-06-27 PROCEDURE — 94660 CPAP INITIATION&MGMT: CPT

## 2019-06-27 PROCEDURE — 99291 CRITICAL CARE FIRST HOUR: CPT | Performed by: INTERNAL MEDICINE

## 2019-06-27 PROCEDURE — 85025 COMPLETE CBC W/AUTO DIFF WBC: CPT

## 2019-06-27 PROCEDURE — 3700000001 HC ADD 15 MINUTES (ANESTHESIA): Performed by: INTERNAL MEDICINE

## 2019-06-27 PROCEDURE — 88172 CYTP DX EVAL FNA 1ST EA SITE: CPT

## 2019-06-27 PROCEDURE — 2580000003 HC RX 258: Performed by: NURSE ANESTHETIST, CERTIFIED REGISTERED

## 2019-06-27 PROCEDURE — 6360000002 HC RX W HCPCS: Performed by: NURSE ANESTHETIST, CERTIFIED REGISTERED

## 2019-06-27 PROCEDURE — 88305 TISSUE EXAM BY PATHOLOGIST: CPT

## 2019-06-27 PROCEDURE — 2709999900 HC NON-CHARGEABLE SUPPLY: Performed by: INTERNAL MEDICINE

## 2019-06-27 PROCEDURE — 87015 SPECIMEN INFECT AGNT CONCNTJ: CPT

## 2019-06-27 PROCEDURE — 31624 DX BRONCHOSCOPE/LAVAGE: CPT | Performed by: INTERNAL MEDICINE

## 2019-06-27 PROCEDURE — 0B9G8ZX DRAINAGE OF LEFT UPPER LUNG LOBE, VIA NATURAL OR ARTIFICIAL OPENING ENDOSCOPIC, DIAGNOSTIC: ICD-10-PCS | Performed by: INTERNAL MEDICINE

## 2019-06-27 PROCEDURE — 88173 CYTOPATH EVAL FNA REPORT: CPT

## 2019-06-27 PROCEDURE — 76000 FLUOROSCOPY <1 HR PHYS/QHP: CPT

## 2019-06-27 PROCEDURE — 87205 SMEAR GRAM STAIN: CPT

## 2019-06-27 PROCEDURE — 6370000000 HC RX 637 (ALT 250 FOR IP): Performed by: ANESTHESIOLOGY

## 2019-06-27 PROCEDURE — 3603165200 HC BRNCHSC EBUS GUIDED SAMPL 1/2 NODE STATION/STRUX: Performed by: INTERNAL MEDICINE

## 2019-06-27 PROCEDURE — 87102 FUNGUS ISOLATION CULTURE: CPT

## 2019-06-27 PROCEDURE — 71045 X-RAY EXAM CHEST 1 VIEW: CPT

## 2019-06-27 PROCEDURE — 7100000010 HC PHASE II RECOVERY - FIRST 15 MIN: Performed by: INTERNAL MEDICINE

## 2019-06-27 PROCEDURE — 31652 BRONCH EBUS SAMPLNG 1/2 NODE: CPT | Performed by: INTERNAL MEDICINE

## 2019-06-27 PROCEDURE — 0BD88ZX EXTRACTION OF LEFT UPPER LOBE BRONCHUS, VIA NATURAL OR ARTIFICIAL OPENING ENDOSCOPIC, DIAGNOSTIC: ICD-10-PCS | Performed by: INTERNAL MEDICINE

## 2019-06-27 PROCEDURE — 31628 BRONCHOSCOPY/LUNG BX EACH: CPT | Performed by: INTERNAL MEDICINE

## 2019-06-27 PROCEDURE — 86140 C-REACTIVE PROTEIN: CPT

## 2019-06-27 RX ORDER — IPRATROPIUM BROMIDE AND ALBUTEROL SULFATE 2.5; .5 MG/3ML; MG/3ML
1 SOLUTION RESPIRATORY (INHALATION)
Status: DISCONTINUED | OUTPATIENT
Start: 2019-06-27 | End: 2019-06-27

## 2019-06-27 RX ORDER — HYDROMORPHONE HCL 110MG/55ML
0.5 PATIENT CONTROLLED ANALGESIA SYRINGE INTRAVENOUS EVERY 5 MIN PRN
Status: DISCONTINUED | OUTPATIENT
Start: 2019-06-27 | End: 2019-06-28

## 2019-06-27 RX ORDER — HYDROMORPHONE HCL 110MG/55ML
0.25 PATIENT CONTROLLED ANALGESIA SYRINGE INTRAVENOUS EVERY 5 MIN PRN
Status: DISCONTINUED | OUTPATIENT
Start: 2019-06-27 | End: 2019-06-28

## 2019-06-27 RX ORDER — HYDRALAZINE HYDROCHLORIDE 20 MG/ML
5 INJECTION INTRAMUSCULAR; INTRAVENOUS EVERY 10 MIN PRN
Status: DISCONTINUED | OUTPATIENT
Start: 2019-06-27 | End: 2019-06-28

## 2019-06-27 RX ORDER — OXYCODONE HYDROCHLORIDE AND ACETAMINOPHEN 5; 325 MG/1; MG/1
1 TABLET ORAL PRN
Status: ACTIVE | OUTPATIENT
Start: 2019-06-27 | End: 2019-06-27

## 2019-06-27 RX ORDER — FUROSEMIDE 10 MG/ML
40 INJECTION INTRAMUSCULAR; INTRAVENOUS ONCE
Status: COMPLETED | OUTPATIENT
Start: 2019-06-27 | End: 2019-06-27

## 2019-06-27 RX ORDER — PROPOFOL 10 MG/ML
INJECTION, EMULSION INTRAVENOUS PRN
Status: DISCONTINUED | OUTPATIENT
Start: 2019-06-27 | End: 2019-06-27 | Stop reason: SDUPTHER

## 2019-06-27 RX ORDER — FENTANYL CITRATE 50 UG/ML
25 INJECTION, SOLUTION INTRAMUSCULAR; INTRAVENOUS EVERY 5 MIN PRN
Status: DISCONTINUED | OUTPATIENT
Start: 2019-06-27 | End: 2019-06-28

## 2019-06-27 RX ORDER — SODIUM CHLORIDE, SODIUM LACTATE, POTASSIUM CHLORIDE, CALCIUM CHLORIDE 600; 310; 30; 20 MG/100ML; MG/100ML; MG/100ML; MG/100ML
INJECTION, SOLUTION INTRAVENOUS CONTINUOUS PRN
Status: DISCONTINUED | OUTPATIENT
Start: 2019-06-27 | End: 2019-06-27 | Stop reason: SDUPTHER

## 2019-06-27 RX ORDER — ONDANSETRON 2 MG/ML
INJECTION INTRAMUSCULAR; INTRAVENOUS PRN
Status: DISCONTINUED | OUTPATIENT
Start: 2019-06-27 | End: 2019-06-27 | Stop reason: SDUPTHER

## 2019-06-27 RX ORDER — PROMETHAZINE HYDROCHLORIDE 25 MG/ML
6.25 INJECTION, SOLUTION INTRAMUSCULAR; INTRAVENOUS
Status: ACTIVE | OUTPATIENT
Start: 2019-06-27 | End: 2019-06-27

## 2019-06-27 RX ORDER — ONDANSETRON 2 MG/ML
4 INJECTION INTRAMUSCULAR; INTRAVENOUS
Status: ACTIVE | OUTPATIENT
Start: 2019-06-27 | End: 2019-06-27

## 2019-06-27 RX ORDER — DEXAMETHASONE SODIUM PHOSPHATE 4 MG/ML
INJECTION, SOLUTION INTRA-ARTICULAR; INTRALESIONAL; INTRAMUSCULAR; INTRAVENOUS; SOFT TISSUE PRN
Status: DISCONTINUED | OUTPATIENT
Start: 2019-06-27 | End: 2019-06-27 | Stop reason: SDUPTHER

## 2019-06-27 RX ORDER — ROCURONIUM BROMIDE 10 MG/ML
INJECTION, SOLUTION INTRAVENOUS PRN
Status: DISCONTINUED | OUTPATIENT
Start: 2019-06-27 | End: 2019-06-27 | Stop reason: SDUPTHER

## 2019-06-27 RX ORDER — LIDOCAINE HYDROCHLORIDE 20 MG/ML
INJECTION, SOLUTION INFILTRATION; PERINEURAL PRN
Status: DISCONTINUED | OUTPATIENT
Start: 2019-06-27 | End: 2019-06-27 | Stop reason: SDUPTHER

## 2019-06-27 RX ORDER — OXYCODONE HYDROCHLORIDE AND ACETAMINOPHEN 5; 325 MG/1; MG/1
2 TABLET ORAL PRN
Status: ACTIVE | OUTPATIENT
Start: 2019-06-27 | End: 2019-06-27

## 2019-06-27 RX ADMIN — ENOXAPARIN SODIUM 40 MG: 100 INJECTION SUBCUTANEOUS at 16:59

## 2019-06-27 RX ADMIN — MIRTAZAPINE 30 MG: 30 TABLET, FILM COATED ORAL at 20:34

## 2019-06-27 RX ADMIN — SODIUM CHLORIDE: 9 INJECTION, SOLUTION INTRAVENOUS at 07:47

## 2019-06-27 RX ADMIN — ACETAMINOPHEN 650 MG: 325 TABLET ORAL at 20:35

## 2019-06-27 RX ADMIN — FUROSEMIDE 40 MG: 10 INJECTION, SOLUTION INTRAMUSCULAR; INTRAVENOUS at 15:55

## 2019-06-27 RX ADMIN — LIDOCAINE HYDROCHLORIDE 5 MG: 20 INJECTION, SOLUTION INFILTRATION; PERINEURAL at 14:18

## 2019-06-27 RX ADMIN — ONDANSETRON 4 MG: 2 INJECTION, SOLUTION INTRAMUSCULAR; INTRAVENOUS at 14:18

## 2019-06-27 RX ADMIN — IPRATROPIUM BROMIDE AND ALBUTEROL SULFATE 1 AMPULE: .5; 3 SOLUTION RESPIRATORY (INHALATION) at 15:51

## 2019-06-27 RX ADMIN — ACETAMINOPHEN 650 MG: 325 TABLET ORAL at 02:16

## 2019-06-27 RX ADMIN — PROPOFOL 200 MG: 10 INJECTION, EMULSION INTRAVENOUS at 14:18

## 2019-06-27 RX ADMIN — MEROPENEM 1 G: 1 INJECTION, POWDER, FOR SOLUTION INTRAVENOUS at 16:59

## 2019-06-27 RX ADMIN — MEROPENEM 1 G: 1 INJECTION, POWDER, FOR SOLUTION INTRAVENOUS at 00:53

## 2019-06-27 RX ADMIN — SODIUM CHLORIDE, POTASSIUM CHLORIDE, SODIUM LACTATE AND CALCIUM CHLORIDE: 600; 310; 30; 20 INJECTION, SOLUTION INTRAVENOUS at 14:06

## 2019-06-27 RX ADMIN — VANCOMYCIN HYDROCHLORIDE 1250 MG: 10 INJECTION, POWDER, LYOPHILIZED, FOR SOLUTION INTRAVENOUS at 20:00

## 2019-06-27 RX ADMIN — IPRATROPIUM BROMIDE AND ALBUTEROL SULFATE 1 AMPULE: .5; 3 SOLUTION RESPIRATORY (INHALATION) at 07:13

## 2019-06-27 RX ADMIN — NADOLOL 40 MG: 40 TABLET ORAL at 20:34

## 2019-06-27 RX ADMIN — TRAZODONE HYDROCHLORIDE 50 MG: 50 TABLET ORAL at 20:34

## 2019-06-27 RX ADMIN — LORAZEPAM 0.5 MG: 0.5 TABLET ORAL at 02:17

## 2019-06-27 RX ADMIN — DEXAMETHASONE SODIUM PHOSPHATE 6 MG: 4 INJECTION, SOLUTION INTRAMUSCULAR; INTRAVENOUS at 14:27

## 2019-06-27 RX ADMIN — SUGAMMADEX 200 MG: 100 INJECTION, SOLUTION INTRAVENOUS at 15:07

## 2019-06-27 RX ADMIN — AMITRIPTYLINE HYDROCHLORIDE 25 MG: 25 TABLET, FILM COATED ORAL at 20:34

## 2019-06-27 RX ADMIN — ROCURONIUM BROMIDE 50 MG: 10 INJECTION, SOLUTION INTRAVENOUS at 14:18

## 2019-06-27 RX ADMIN — MEROPENEM 1 G: 1 INJECTION, POWDER, FOR SOLUTION INTRAVENOUS at 08:42

## 2019-06-27 ASSESSMENT — PULMONARY FUNCTION TESTS
PIF_VALUE: 0
PIF_VALUE: 0
PIF_VALUE: 21
PIF_VALUE: 33
PIF_VALUE: 0
PIF_VALUE: 32
PIF_VALUE: 0
PIF_VALUE: 3
PIF_VALUE: 0
PIF_VALUE: 32
PIF_VALUE: 0
PIF_VALUE: 3
PIF_VALUE: 0
PIF_VALUE: 0
PIF_VALUE: 32
PIF_VALUE: 0
PIF_VALUE: 2
PIF_VALUE: 0
PIF_VALUE: 26
PIF_VALUE: 0
PIF_VALUE: 18
PIF_VALUE: 0
PIF_VALUE: 32
PIF_VALUE: 0
PIF_VALUE: 0
PIF_VALUE: 33
PIF_VALUE: 0
PIF_VALUE: 0
PIF_VALUE: 33
PIF_VALUE: 6
PIF_VALUE: 33
PIF_VALUE: 0
PIF_VALUE: 32
PIF_VALUE: 1
PIF_VALUE: 0
PIF_VALUE: 0
PIF_VALUE: 1
PIF_VALUE: 3
PIF_VALUE: 0
PIF_VALUE: 15
PIF_VALUE: 19
PIF_VALUE: 0
PIF_VALUE: 26
PIF_VALUE: 0
PIF_VALUE: 32
PIF_VALUE: 0
PIF_VALUE: 22
PIF_VALUE: 1
PIF_VALUE: 0
PIF_VALUE: 0
PIF_VALUE: 23
PIF_VALUE: 3
PIF_VALUE: 0
PIF_VALUE: 2
PIF_VALUE: 0
PIF_VALUE: 2
PIF_VALUE: 0
PIF_VALUE: 0
PIF_VALUE: 9
PIF_VALUE: 33
PIF_VALUE: 31
PIF_VALUE: 0
PIF_VALUE: 32
PIF_VALUE: 0
PIF_VALUE: 32
PIF_VALUE: 0
PIF_VALUE: 32
PIF_VALUE: 0
PIF_VALUE: 32
PIF_VALUE: 0
PIF_VALUE: 0
PIF_VALUE: 32
PIF_VALUE: 0
PIF_VALUE: 33
PIF_VALUE: 1
PIF_VALUE: 23
PIF_VALUE: 0
PIF_VALUE: 0
PIF_VALUE: 32
PIF_VALUE: 0
PIF_VALUE: 0
PIF_VALUE: 33
PIF_VALUE: 0
PIF_VALUE: 37
PIF_VALUE: 32
PIF_VALUE: 0
PIF_VALUE: 0
PIF_VALUE: 26
PIF_VALUE: 0
PIF_VALUE: 0
PIF_VALUE: 1
PIF_VALUE: 32
PIF_VALUE: 32
PIF_VALUE: 0
PIF_VALUE: 1
PIF_VALUE: 0
PIF_VALUE: 26
PIF_VALUE: 0
PIF_VALUE: 5
PIF_VALUE: 37
PIF_VALUE: 35
PIF_VALUE: 0
PIF_VALUE: 34
PIF_VALUE: 0
PIF_VALUE: 36
PIF_VALUE: 2
PIF_VALUE: 0
PIF_VALUE: 0
PIF_VALUE: 32
PIF_VALUE: 3
PIF_VALUE: 29
PIF_VALUE: 0
PIF_VALUE: 2
PIF_VALUE: 32
PIF_VALUE: 0
PIF_VALUE: 0
PIF_VALUE: 22
PIF_VALUE: 0
PIF_VALUE: 0
PIF_VALUE: 33
PIF_VALUE: 0
PIF_VALUE: 0
PIF_VALUE: 1
PIF_VALUE: 0
PIF_VALUE: 0
PIF_VALUE: 33
PIF_VALUE: 30
PIF_VALUE: 0
PIF_VALUE: 23
PIF_VALUE: 32
PIF_VALUE: 0
PIF_VALUE: 0
PIF_VALUE: 26
PIF_VALUE: 0
PIF_VALUE: 33
PIF_VALUE: 0
PIF_VALUE: 32
PIF_VALUE: 0
PIF_VALUE: 0
PIF_VALUE: 32
PIF_VALUE: 0

## 2019-06-27 ASSESSMENT — PAIN DESCRIPTION - DESCRIPTORS
DESCRIPTORS: CRUSHING;HEADACHE
DESCRIPTORS: HEADACHE

## 2019-06-27 ASSESSMENT — PAIN DESCRIPTION - LOCATION
LOCATION: CHEST;HEAD
LOCATION: HEAD

## 2019-06-27 ASSESSMENT — PAIN DESCRIPTION - PAIN TYPE
TYPE: ACUTE PAIN
TYPE: ACUTE PAIN

## 2019-06-27 ASSESSMENT — PAIN SCALES - GENERAL
PAINLEVEL_OUTOF10: 0
PAINLEVEL_OUTOF10: 0
PAINLEVEL_OUTOF10: 4
PAINLEVEL_OUTOF10: 7
PAINLEVEL_OUTOF10: 0

## 2019-06-27 ASSESSMENT — PAIN DESCRIPTION - ORIENTATION: ORIENTATION: RIGHT;LEFT

## 2019-06-27 ASSESSMENT — COPD QUESTIONNAIRES: CAT_SEVERITY: SEVERE

## 2019-06-27 ASSESSMENT — PAIN DESCRIPTION - FREQUENCY: FREQUENCY: CONTINUOUS

## 2019-06-27 NOTE — ANESTHESIA POSTPROCEDURE EVALUATION
Department of Anesthesiology  Postprocedure Note    Patient: Johann Cook  MRN: [id number]  YOB: 1955  Date of evaluation: 6/27/2019  Time:  4:23 PM     Procedure Summary     Date:  06/27/19 Room / Location:  [facility] ENDO 03 / [facility]    Anesthesia Start:  0822 Anesthesia Stop:  4465    Procedures:       EBUS WF W/ANES. (N/A )      BRONCHOSCOPY/TRANSBRONCHIAL NEEDLE BIOPSY      BRONCHOSCOPY/TRANSBRONCHIAL LUNG BIOPSY      BRONCHOSCOPY ALVEOLAR LAVAGE Diagnosis:  (?)    Surgeon:  Paul Santana MD Responsible Provider:  Jt Flor MD    Anesthesia Type:  general ASA Status:  3 - Emergent     Anesthesia Type: general    Liz Phase I: Liz Score: 8    Liz Phase II:      Last vitals: Reviewed and per EMR flowsheets. Anesthesia Post Evaluation   Anesthetic Problems: no   Cardiovascular System Stable: yes  Respiratory Function: Airway Patent yes  ETT no  Ventilator no  SEE BRLOW  Level of consciousness: awake, alert and oriented  Post-op pain: adequate analgesia  Hydration Adequate: yes  Nausea/Vomiting:no  Other Issues: Post-op Sats low with wheezes and Rales. pCXR shows pulonary edema without PTX. IV- Lasix plus BiPAP Plus Duoneb with rapid improvement. Patient to be admitted to the ICU.     Augusto Crandall MD

## 2019-06-27 NOTE — FLOWSHEET NOTE
06/27/19 0830   Vital Signs   Temp 96.6 °F (35.9 °C)   Temp Source Oral   Pulse 81   Heart Rate Source Monitor   Resp 20   /75   BP Location Right upper arm   BP Upper/Lower Upper   Patient Position Semi fowlers   Level of Consciousness 0   MEWS Score 1   Oxygen Therapy   SpO2 94 %   O2 Device Nasal cannula   O2 Flow Rate (L/min) 3 L/min   AM assessment complete and morning meds given, see MAR. PO meds held d/t NPO status. Respirations e/e. Dr. Almaz Linton at bedside discussing [address] with pt, bronchoscopy today. Pt denies any needs at this time. Bed alarm is on and pt educated to call to get OOB. Call light within reach, will continue to monitor.

## 2019-06-27 NOTE — PROGRESS NOTES
Pt arrived to unit on NR sat 88%. Placed on BIPAP O2 sat up to 95%. Placed on ICU monitoring, NSR, VSS. PIV present and WNL. Pt resting in bed at this time, will continue to monitor.   Ester Davidson RN

## 2019-06-27 NOTE — PROGRESS NOTES
Pt sleeping oxygen out of nose. While fixing it pt wakes up I ask patient if she needs repositioned and she states that is how she sleeps at home. Denies needs. Will monitor.

## 2019-06-27 NOTE — PROGRESS NOTES
Report given to Shira Mena RN. Pt's belongings taken upstairs to ICU 1. Pt's daughter taken to ICU 1. Care has been transferred at this time.

## 2019-06-27 NOTE — H&P
See History and Physical from today for HPI additional findings    Past Medical History:   Diagnosis Date    Anxiety     COPD (chronic obstructive pulmonary disease) ([facility] [address]     Depression     Hyperlipidemia     Hypertension     Rash     Thyroid disease      Family History   Problem Relation Age of Onset    Diabetes Mother     High Blood Pressure Mother     Asthma Sister      Past Surgical History:   Procedure Laterality Date    CHOLECYSTECTOMY      HYSTERECTOMY, TOTAL ABDOMINAL         Allergies   Allergen Reactions    Penicillins Anaphylaxis     Tolerates Meropenem.  Cefuroxime      Tolerates Meropenem.  Doxycycline Hives    Imitrex [Sumatriptan] Other (See Comments)     Feels like pins and needles    Meperidine     Sulfa Antibiotics Hives    Bactrim [Sulfamethoxazole-Trimethoprim] Rash    Keflex [Cephalexin] Itching and Rash     Tolerates Meropenem.  Tape Rehan Ditto Tape] Rash     No current facility-administered medications on file prior to encounter. Current Outpatient Medications on File Prior to Encounter   Medication Sig Dispense Refill    LORazepam (ATIVAN) 0.5 MG tablet Take 0.5 mg by mouth every 6 hours as needed for Anxiety.       Azelastine-Fluticasone (DYMISTA) 137-50 MCG/ACT SUSP by Nasal route      Esomeprazole Magnesium (NEXIUM 24HR PO) Take by mouth      hydrOXYzine (ATARAX) 10 MG tablet Take 10 mg by mouth 3 times daily as needed for Itching      mirtazapine (REMERON) 30 MG tablet TAKE 1 TABLET BY MOUTH EVERY DAY AT NIGHT 30 tablet 0    ipratropium-albuterol (DUONEB) 0.5-2.5 (3) MG/3ML SOLN nebulizer solution Inhale 3 mLs into the lungs every 6 hours as needed for Shortness of Breath 360 mL 0    albuterol sulfate  (90 Base) MCG/ACT inhaler Inhale 2 puffs into the lungs 4 times daily as needed for Wheezing 1 Inhaler 0    levothyroxine (SYNTHROID) 150 MCG tablet TAKE 1 TABLET BY MOUTH EVERY DAY 30 tablet 3    nadolol (CORGARD) 20 MG tablet TAKE 2 TABLETS BY MOUTH EVERY DAY (Patient taking differently: nightly TAKE 2 TABLETS BY MOUTH EVERY DAY) 60 tablet 5    amitriptyline (ELAVIL) 25 MG tablet Take 1 tablet by mouth nightly 30 tablet 5    traZODone (DESYREL) 50 MG tablet TAKE 1 TABLET BY MOUTH NIGHTLY 30 tablet 5    ketorolac (TORADOL) 10 MG tablet Take 1 tablet by mouth every 6 hours as needed for Pain (migraines) 30 tablet 5    sertraline (ZOLOFT) 100 MG tablet Take 2 tablets by mouth daily 60 tablet 5    acetaminophen (TYLENOL) 500 MG tablet Take 1,000 mg by mouth      aspirin 81 MG chewable tablet Take 1 tablet by mouth daily. 30 tablet 0    OXYGEN Inhale 2 L into the lungs      potassium chloride (KLOR-CON M) 10 MEQ extended release tablet Take 1 tablet by mouth daily 30 tablet 0    furosemide (LASIX) 20 MG tablet Take 1 tablet by mouth daily 30 tablet 0       HENT: Airway patent and reviewed. Mallampati 4   Cardiovascular: Normal rate, regular rhythm, normal heart sounds. Pulmonary/Chest: No wheezes. No rhonchi. No rales. Abdominal: Soft. Bowel sounds are normal. No distension. ASA Class: Class 3 - A patient with severe systemic disease that limits activity but is not incapacitating  Level of Sedation Plan: per Anesthesia    Post Procedure Plan   Discharge home or return to same level of care per post-procedure recovery protocol   ______________________     The risks and benefits as well as alternatives to the procedure have been discussed with the patient and or family. The patient and or next of kin understands and agrees to proceed. Signed Consent in chart.

## 2019-06-27 NOTE — PROGRESS NOTES
Shift assessment completed. Pt is alert and oriented. Vital signs are WNL. Respirations are even & easy but with dyspnea with any exertion. Pt does have cough. Lungs diminished t/o. Sputum sent at this time. Pt denies needs at this time. SR up x 2 and bed in low position. Call light is within reach. Will monitor.

## 2019-06-27 NOTE — CONSULTS
Reason for referral and CC: SOB, cough    HISTORY OF PRESENT ILLNESS: 76 yo F with a h/o several healthcare encounters in the last year for SOB and cough. She has been treated with abx several times. She thinks that this has been happening since Arpil 2018. DC'd from [facility] 1 week ago for the same . Had fevers at the time. She states she does not feel better despite the abx. Had a fever 101 yesterday. Cough with yellow sputum. Denies mold or bird exposure. No small joint pain or swelling. No raynaud's. + GERD. + fevers. Past Medical History:   Diagnosis Date    Anxiety     COPD (chronic obstructive pulmonary disease) ([address]     Depression     Hyperlipidemia     Hypertension     Rash     Thyroid disease      Past Surgical History:   Procedure Laterality Date    CHOLECYSTECTOMY      HYSTERECTOMY, TOTAL ABDOMINAL       Family History  family history includes Asthma in her sister; Diabetes in her mother; High Blood Pressure in her mother. Social History:  reports that she has never smoked. She has never used smokeless tobacco.   reports that she does not drink alcohol. ALLERGIES:  Patient is allergic to penicillins; cefuroxime; doxycycline; imitrex [sumatriptan]; meperidine; sulfa antibiotics; bactrim [sulfamethoxazole-trimethoprim]; keflex [cephalexin]; and tape [adhesive tape].   Continuous Infusions:   sodium chloride 75 mL/hr at 06/27/19 0747     Scheduled Meds:   amitriptyline  25 mg Oral Nightly    aspirin  81 mg Oral Daily    levothyroxine  150 mcg Oral QAM AC    mirtazapine  30 mg Oral Nightly    sertraline  200 mg Oral Daily    traZODone  50 mg Oral Nightly    sodium chloride flush  10 mL Intravenous 2 times per day    enoxaparin  40 mg Subcutaneous Daily    meropenem  1 g Intravenous Q8H    vancomycin  1,250 mg Intravenous Q24H    ipratropium-albuterol  1 ampule Inhalation TID    nadolol  40 mg Oral Nightly    furosemide  20 mg Oral Daily     PRN Meds:  albuterol sulfate HFA, CLARITYU, SPECGRAV, LEUKOCYTESUR, UROBILINOGEN, BILIRUBINUR, BLOODU, GLUCOSEU, AMORPHOUS in the last 72 hours. Invalid input(s): Saeed Pemberton  Recent Labs     06/26/19  1713   PHART 7.440   HOD7TIJ 30.8*   PO2ART 55.9*     2/10/19 Aspergillus ab negative  2/6/19 IGE 90  Negative rep immunocap  Multiple negative respiratory cultures    Chest CT 6/16/19  Mediastinum: Evaluation of the mediastinal structures somewhat limited   without intravenous contrast.  There are mildly prominent lymph nodes within   the right paratracheal region, aortopulmonic window, and subcarinal regions. These are similar to recent exams.  Cardiac size upper limits of normal.  No   pericardial effusion.  Minimal calcification noted within the thoracic aorta.       Lungs/pleura: Moderate patchy airspace opacity noted throughout all segments   of both lungs-worse when compared to May 30, 2019.  Central airways are   patent.  No pleural effusion or pneumothorax. 6/26/19 CXR   The heart size is enlarged but unchanged.  The pulmonary vasculature is   congested.  There are interstitial opacities noted within the lungs   bilaterally.  No new airspace disease is seen.  No pneumothoraces are noted.      Persistent mildly elevated PCT 0.16 through June     ASSESSMENT:  ·  Abnormal Chest CT: Progressive changes on CT scans in March, May and June  · The differential includes recurrent or atypical infection,aspiration pneumonitis,  Sarcoidosis, , HP, CT ILD, Vasculitis, NSIP, less likely lymphoma, pulmonary edema  · Acute hypoxic respiratory failure  · Fever  · Afib (RVR last visit)  · Restrictive lung disease  · Morbid obesity  · Likely WILFRIDO    PLAN:  · Plan for Bronchoscopy for BAL and biopsies  · Start a course of prednisone after Bronchoscopy  · Check AXEL, ANCA and immunoglobulins  · She was started on vanc and merrem  · Home lasix  · SLP  · Supplemental oxygen to maintain SaO2 >92%; wean as tolerated   · Risks and benefits of bronchoscopy were discussed with patient including complications (collapse lung, bleed, mechanical ventilation and cardiopulmonary arrest). The patient is agreeable. Addendum: In PACU the pt was hypoxic and required initiation of Bipap and was transferred to the ICU. + SOB. No CP. Not in distress. Lasix given. CXR shows pulmonary edema. Repeat in am. Family updated at bedside.   CCT 32 min    Thank you Arlene Ahumada, MD for this consult

## 2019-06-27 NOTE — ANESTHESIA PRE PROCEDURE
Department of Anesthesiology  Preprocedure Note       Name:  Javon Zaldivar   Age:  75 y.o.  :  1955                                          MRN:  [id number]         Date:  2019      Surgeon:  Denzel Barker MD    Procedure: EBUS WF W/JULIAS. (N/A )    HPI:  The patient is a 75 y.o. female with COPD, anxiety, HLD, HTN, hypothyroidism who presented to [facility] ED with complaint of SOB. Patient was recently admitted for the same and treated for recurrent PNA and sent home on Abx. She reports that she completed the Abx but has not felt any better. Still having a productive cough and feeling extremely SOB despite home O2. She reports that she cant sleep bc of the cough and has been increasingly fatigued. Denies any fevers at home. Reports compliance with her home medications. Denies any CP. No leg swelling. CT Chest: Moderate multifocal airspace opacity involving all segments of both lungs-moderately worse over the past 2 weeks. Multifocal pneumonia is favored. Asymmetric pulmonary edema superimposed upon COPD possible but less likely. Atypical pneumonia might be considered clinically. Medications prior to admission:    LORazepam (ATIVAN) 0.5 MG tablet Take 0.5 mg by mouth every 6 hours as needed for Anxiety.    Azelastine-Fluticasone (DYMISTA) 137-50 MCG/ACT SUSP by Nasal route   Esomeprazole Magnesium (NEXIUM 24HR PO) Take by mouth   hydrOXYzine (ATARAX) 10 MG tablet Take 10 mg by mouth 3 times daily as needed for Itching   mirtazapine (REMERON) 30 MG tablet TAKE 1 TABLET BY MOUTH EVERY DAY AT NIGHT   ipratropium-albuterol (DUONEB)nebulizer solution Inhale 3 mLs into the lungs every 6 hours as needed for Shortness of Breath   albuterol sulfate  inhaler Inhale 2 puffs into the lungs 4 times daily as needed for Wheezing   levothyroxine (SYNTHROID) 150 MCG tablet TAKE 1 TABLET BY MOUTH EVERY DAY   nadolol (CORGARD) 20 MG tablet TAKE 2 TABLETS BY MOUTH EVERY DAY   amitriptyline (ELAVIL) 25 MG tablet Take 1 tablet by mouth nightly   traZODone (DESYREL) 50 MG tablet TAKE 1 TABLET BY MOUTH NIGHTLY   ketorolac (TORADOL) 10 MG tablet Take 1 tablet by mouth every 6 hours as needed for Pain (migraines)   sertraline (ZOLOFT) 100 MG tablet Take 2 tablets by mouth daily   acetaminophen (TYLENOL) 500 MG tablet Take 1,000 mg by mouth   aspirin 81 MG chewable tablet Take 1 tablet by mouth daily.    OXYGEN Inhale 2 L into the lungs   potassium chloride (KLOR-CON M) 10 MEQ ER Take 1 tablet by mouth daily   furosemide (LASIX) 20 MG tablet Take 1 tablet by mouth daily       Current medications:     albuterol sulfate  (90 Base) MCG/ACT inhaler 2 puff  2 puff Inhalation 4x Daily PRN    amitriptyline (ELAVIL) tablet 25 mg  25 mg Oral Nightly    aspirin chewable tablet 81 mg  81 mg Oral Daily    levothyroxine (SYNTHROID) tablet 150 mcg  150 mcg Oral QAM AC    LORazepam (ATIVAN) tablet 0.5 mg  0.5 mg Oral Q6H PRN    mirtazapine (REMERON) tablet 30 mg  30 mg Oral Nightly    sertraline (ZOLOFT) tablet 200 mg  200 mg Oral Daily    traZODone (DESYREL) tablet 50 mg  50 mg Oral Nightly    sodium chloride flush 0.9 % injection 10 mL  10 mL Intravenous 2 times per day    sodium chloride flush 0.9 % injection 10 mL  10 mL Intravenous PRN    magnesium hydroxide (MILK OF MAGNESIA) 400 MG/5ML suspension 30 mL  30 mL Oral Daily PRN    ondansetron (ZOFRAN) injection 4 mg  4 mg Intravenous Q6H PRN    enoxaparin (LOVENOX) injection 40 mg  40 mg Subcutaneous Daily    acetaminophen (TYLENOL) tablet 650 mg  650 mg Oral Q4H PRN    0.9 % sodium chloride infusion   Intravenous Continuous    meropenem (MERREM) 1 g in sodium chloride 0.9 % 100 mL IVPB (mini-bag)  1 g Intravenous Q8H    vancomycin (VANCOCIN) 1,250 mg in dextrose 5 % 250 mL IVPB  1,250 mg Intravenous Q24H    ipratropium-albuterol (DUONEB) nebulizer solution 1 ampule  1 ampule Inhalation TID    nadolol (CORGARD) tablet 40 mg  40 mg Oral Nightly    furosemide (LASIX) tablet 20 mg  20 mg Oral Daily     Allergies:     Penicillins Anaphylaxis     Tolerates Meropenem.  Cefuroxime      Tolerates Meropenem.  Doxycycline Hives    Imitrex [Sumatriptan] Other (See Comments)     Feels like pins and needles    Meperidine     Sulfa Antibiotics Hives    Bactrim [Sulfamethoxazole-Trimethoprim] Rash    Keflex [Cephalexin] Itching and Rash     Tolerates Meropenem.     Tape Dedrick Ranger Tape] Rash     Problem List:     Chest pain    HTN (hypertension)    Hyperlipidemia with target LDL less than 130    Depression    Hypokalemia    Insomnia    Trochanteric bursitis of both hips    Migraine    Syncope    Gastrointestinal hemorrhage associated with gastric ulcer    Acute blood loss anemia    Fatigue    Hypothyroidism    Mild single current episode of major depressive disorder (HCC)    Anxiety    Acute respiratory distress    Acute respiratory failure with hypoxia (HCC)    Bronchitis    Bronchospasm    Hypoxia    Multifocal pneumonia    Atrial fibrillation (HCC)    Bronchiectasis with acute exacerbation (HCC)    Pneumonia    Atypical pneumonia    Acute bronchitis with bronchospasm    Chronic diastolic [facility]congestive [facility] failure[facility]    Class 2 obesity with body mass index (BMI) of 39.0 to 39.9 in adult    Abnormal chest CT    Fever     Past Medical History:     Anxiety     COPD (chronic obstructive pulmonary disease) (HCC)     Depression     Hyperlipidemia     Hypertension     Rash     Thyroid disease      Past Surgical History:     CHOLECYSTECTOMY      HYSTERECTOMY, TOTAL ABDOMINAL       Social History:     Smoking status: Never Smoker    Smokeless tobacco: Never Used   Substance Use Topics    Alcohol use: No     Vital Signs (Current):     BP: 152/82 Pulse: 85   Resp: 20 SpO2: 95   Temp: 97.2 °F (36.2 °C)   Height: 5' 0.35\" (1.533 m)  (06/26/19) Weight: 202 lb (91.6 kg)  (06/26/19)   BMI: 39.1            BP Readings from Last 3 Encounters:   06/27/19 (!) 152/82   06/20/19 105/71   06/11/19 124/68     NPO Status: > 8 hr  CBC:    WBC 8.3 06/27/2019    HGB 10.8 06/27/2019    HCT 34.7 06/27/2019     06/27/2019     CMP:     06/27/2019    K 3.7 06/27/2019     06/27/2019    CO2 18 06/27/2019    BUN 8 06/27/2019    CREATININE 0.6 06/27/2019    GLUCOSE 115 06/27/2019    PROT 7.5 06/26/2019    CALCIUM 8.4 06/27/2019    BILITOT 0.6 06/26/2019    ALKPHOS 129 06/26/2019    AST 58 06/26/2019    ALT 13 06/26/2019     Coags:    PROTIME 15.7 06/27/2019    INR 1.38 06/27/2019     ABGs:    PHART 7.440 06/26/2019    PO2ART 55.9 06/26/2019    KHR2OTN 30.8 06/26/2019    GHR0VOF 20.4 06/26/2019    BEART -2.9 06/26/2019    H9HXQOEL 90.6 06/26/2019      Anesthesia Evaluation  Patient summary reviewed and Nursing notes reviewed  Airway: Mallampati: III  TM distance: >3 FB   Neck ROM: full  Mouth opening: > = 3 FB Dental:          Pulmonary:   (+) pneumonia: unresolved,  COPD: severe,                            ROS comment: On Home O2   Cardiovascular:  Exercise tolerance: good (>4 METS),   (+) hypertension:, hyperlipidemia    (-)  angina and  LEE             ROS comment: EKG: Normal sinus rhythmNonspecific ST abnormalityWhen compared with ECG of 17-JUN-2019 22:30,Sinus rhythm has replaced Atrial fibrillationVent. rate has decreased BY  53   ECHO: Normal LV systolic function with an estimated EF of 55-60%. No regional wall motion abnormalities are seen. There is borderline concentric left ventricular hypertrophy. Grade II diastolic dysfunction with elevated filling pressure. Mild mitral annular calcification. The right atrium is mildly dilated. Mild to moderate mitral and tricuspid regurgitation. Mild pulmonic regurgitation present. Systolic pulmonary artery pressure (SPAP) estimated at 42mmHg, consistent with mild pulmonary hypertension.       Neuro/Psych:   (+) headaches: migraine headaches,             GI/Hepatic/Renal:   (+) GERD: well controlled, PUD,      (-) no

## 2019-06-27 NOTE — PROGRESS NOTES
Ok per Dr Sylvia Santana to transfer pt to ICU bed #1, pt received Duoneb treatment via [facility] as well as 40 mg Lasix IVP prior to departure from unit, pt also on bipap at 60% with IPAP/EPAP 16/8, VSS, Sao2 96% on above settings, pt changed over to NRB mask 100% FIO2 with SATS of 86-88% during transport lasting approx 5 min. Pt returned to bipap on arrival to unit report given to Jesika Babin at bedside.

## 2019-06-28 ENCOUNTER — APPOINTMENT (OUTPATIENT)
Dept: GENERAL RADIOLOGY | Age: 64
DRG: 177 | End: 2019-06-28
Payer: COMMERCIAL

## 2019-06-28 PROBLEM — J96.21 ACUTE ON CHRONIC RESPIRATORY FAILURE WITH HYPOXIA (HCC): Status: ACTIVE | Noted: 2019-06-04

## 2019-06-28 LAB
ALBUMIN SERPL-MCNC: 3 G/DL (ref 3.4–5)
ANION GAP SERPL CALCULATED.3IONS-SCNC: 13 MMOL/L (ref 3–16)
ANISOCYTOSIS: ABNORMAL
BASOPHILS ABSOLUTE: 0 K/UL (ref 0–0.2)
BASOPHILS RELATIVE PERCENT: 0 %
BUN BLDV-MCNC: 12 MG/DL (ref 7–20)
CALCIUM SERPL-MCNC: 8.6 MG/DL (ref 8.3–10.6)
CHLORIDE BLD-SCNC: 104 MMOL/L (ref 99–110)
CO2: 23 MMOL/L (ref 21–32)
CREAT SERPL-MCNC: 0.6 MG/DL (ref 0.6–1.2)
CULTURE, RESPIRATORY: NORMAL
EOSINOPHILS ABSOLUTE: 0 K/UL (ref 0–0.6)
EOSINOPHILS RELATIVE PERCENT: 0 %
GFR AFRICAN AMERICAN: >60
GFR NON-AFRICAN AMERICAN: >60
GLUCOSE BLD-MCNC: 104 MG/DL (ref 70–99)
GLUCOSE BLD-MCNC: 146 MG/DL (ref 70–99)
GLUCOSE BLD-MCNC: 237 MG/DL (ref 70–99)
GLUCOSE BLD-MCNC: 364 MG/DL (ref 70–99)
GRAM STAIN RESULT: NORMAL
HCT VFR BLD CALC: 32.6 % (ref 36–48)
HEMATOLOGY PATH CONSULT: NO
HEMOGLOBIN: 10.7 G/DL (ref 12–16)
HYPOCHROMIA: ABNORMAL
LYMPHOCYTES ABSOLUTE: 0.6 K/UL (ref 1–5.1)
LYMPHOCYTES RELATIVE PERCENT: 7 %
MCH RBC QN AUTO: 27.8 PG (ref 26–34)
MCHC RBC AUTO-ENTMCNC: 33 G/DL (ref 31–36)
MCV RBC AUTO: 84.3 FL (ref 80–100)
MONOCYTES ABSOLUTE: 0.5 K/UL (ref 0–1.3)
MONOCYTES RELATIVE PERCENT: 5 %
NEUTROPHILS ABSOLUTE: 8 K/UL (ref 1.7–7.7)
NEUTROPHILS RELATIVE PERCENT: 88 %
PDW BLD-RTO: 17 % (ref 12.4–15.4)
PERFORMED ON: ABNORMAL
PHOSPHORUS: 3.1 MG/DL (ref 2.5–4.9)
PLATELET # BLD: 205 K/UL (ref 135–450)
PLATELET SLIDE REVIEW: ADEQUATE
PMV BLD AUTO: 7.2 FL (ref 5–10.5)
POIKILOCYTES: ABNORMAL
POTASSIUM SERPL-SCNC: 3.7 MMOL/L (ref 3.5–5.1)
PRO-BNP: 368 PG/ML (ref 0–124)
RBC # BLD: 3.86 M/UL (ref 4–5.2)
SLIDE REVIEW: ABNORMAL
SODIUM BLD-SCNC: 140 MMOL/L (ref 136–145)
WBC # BLD: 9.1 K/UL (ref 4–11)

## 2019-06-28 PROCEDURE — 6360000002 HC RX W HCPCS: Performed by: INTERNAL MEDICINE

## 2019-06-28 PROCEDURE — 71045 X-RAY EXAM CHEST 1 VIEW: CPT

## 2019-06-28 PROCEDURE — 97530 THERAPEUTIC ACTIVITIES: CPT

## 2019-06-28 PROCEDURE — 85025 COMPLETE CBC W/AUTO DIFF WBC: CPT

## 2019-06-28 PROCEDURE — 97166 OT EVAL MOD COMPLEX 45 MIN: CPT

## 2019-06-28 PROCEDURE — 2580000003 HC RX 258: Performed by: PHYSICIAN ASSISTANT

## 2019-06-28 PROCEDURE — 88184 FLOWCYTOMETRY/ TC 1 MARKER: CPT

## 2019-06-28 PROCEDURE — 6370000000 HC RX 637 (ALT 250 FOR IP): Performed by: INTERNAL MEDICINE

## 2019-06-28 PROCEDURE — 6360000002 HC RX W HCPCS: Performed by: PHYSICIAN ASSISTANT

## 2019-06-28 PROCEDURE — 94761 N-INVAS EAR/PLS OXIMETRY MLT: CPT

## 2019-06-28 PROCEDURE — 97162 PT EVAL MOD COMPLEX 30 MIN: CPT

## 2019-06-28 PROCEDURE — 92526 ORAL FUNCTION THERAPY: CPT

## 2019-06-28 PROCEDURE — 99233 SBSQ HOSP IP/OBS HIGH 50: CPT | Performed by: INTERNAL MEDICINE

## 2019-06-28 PROCEDURE — 2060000000 HC ICU INTERMEDIATE R&B

## 2019-06-28 PROCEDURE — 80069 RENAL FUNCTION PANEL: CPT

## 2019-06-28 PROCEDURE — 92610 EVALUATE SWALLOWING FUNCTION: CPT

## 2019-06-28 PROCEDURE — 94640 AIRWAY INHALATION TREATMENT: CPT

## 2019-06-28 PROCEDURE — 2580000003 HC RX 258

## 2019-06-28 PROCEDURE — 36415 COLL VENOUS BLD VENIPUNCTURE: CPT

## 2019-06-28 PROCEDURE — 2580000003 HC RX 258: Performed by: INTERNAL MEDICINE

## 2019-06-28 PROCEDURE — 97535 SELF CARE MNGMENT TRAINING: CPT

## 2019-06-28 PROCEDURE — 6370000000 HC RX 637 (ALT 250 FOR IP): Performed by: PHYSICIAN ASSISTANT

## 2019-06-28 PROCEDURE — 2700000000 HC OXYGEN THERAPY PER DAY

## 2019-06-28 PROCEDURE — 83880 ASSAY OF NATRIURETIC PEPTIDE: CPT

## 2019-06-28 PROCEDURE — 88185 FLOWCYTOMETRY/TC ADD-ON: CPT

## 2019-06-28 PROCEDURE — 86255 FLUORESCENT ANTIBODY SCREEN: CPT

## 2019-06-28 RX ORDER — SODIUM CHLORIDE 9 MG/ML
INJECTION, SOLUTION INTRAVENOUS
Status: COMPLETED
Start: 2019-06-28 | End: 2019-06-28

## 2019-06-28 RX ORDER — METHYLPREDNISOLONE SODIUM SUCCINATE 40 MG/ML
40 INJECTION, POWDER, LYOPHILIZED, FOR SOLUTION INTRAMUSCULAR; INTRAVENOUS EVERY 12 HOURS
Status: DISCONTINUED | OUTPATIENT
Start: 2019-06-28 | End: 2019-07-02

## 2019-06-28 RX ORDER — NICOTINE POLACRILEX 4 MG
15 LOZENGE BUCCAL PRN
Status: DISCONTINUED | OUTPATIENT
Start: 2019-06-28 | End: 2019-07-03 | Stop reason: HOSPADM

## 2019-06-28 RX ORDER — DEXTROSE MONOHYDRATE 25 G/50ML
12.5 INJECTION, SOLUTION INTRAVENOUS PRN
Status: DISCONTINUED | OUTPATIENT
Start: 2019-06-28 | End: 2019-07-03 | Stop reason: HOSPADM

## 2019-06-28 RX ORDER — DEXTROSE MONOHYDRATE 50 MG/ML
100 INJECTION, SOLUTION INTRAVENOUS PRN
Status: DISCONTINUED | OUTPATIENT
Start: 2019-06-28 | End: 2019-07-03 | Stop reason: HOSPADM

## 2019-06-28 RX ORDER — FUROSEMIDE 10 MG/ML
40 INJECTION INTRAMUSCULAR; INTRAVENOUS ONCE
Status: COMPLETED | OUTPATIENT
Start: 2019-06-28 | End: 2019-06-28

## 2019-06-28 RX ORDER — ALBUTEROL SULFATE 2.5 MG/3ML
2.5 SOLUTION RESPIRATORY (INHALATION) EVERY 4 HOURS PRN
Status: DISCONTINUED | OUTPATIENT
Start: 2019-06-28 | End: 2019-06-29

## 2019-06-28 RX ADMIN — ACETAMINOPHEN 650 MG: 325 TABLET ORAL at 15:52

## 2019-06-28 RX ADMIN — ASPIRIN 81 MG 81 MG: 81 TABLET ORAL at 09:37

## 2019-06-28 RX ADMIN — ENOXAPARIN SODIUM 40 MG: 100 INJECTION SUBCUTANEOUS at 15:53

## 2019-06-28 RX ADMIN — SERTRALINE 200 MG: 100 TABLET, FILM COATED ORAL at 09:37

## 2019-06-28 RX ADMIN — LEVOTHYROXINE SODIUM 150 MCG: 150 TABLET ORAL at 09:38

## 2019-06-28 RX ADMIN — Medication 10 ML: at 09:37

## 2019-06-28 RX ADMIN — ACETAMINOPHEN 650 MG: 325 TABLET ORAL at 09:36

## 2019-06-28 RX ADMIN — Medication 10 ML: at 20:29

## 2019-06-28 RX ADMIN — INSULIN LISPRO 2 UNITS: 100 INJECTION, SOLUTION INTRAVENOUS; SUBCUTANEOUS at 16:29

## 2019-06-28 RX ADMIN — NADOLOL 40 MG: 40 TABLET ORAL at 20:30

## 2019-06-28 RX ADMIN — METHYLPREDNISOLONE SODIUM SUCCINATE 40 MG: 40 INJECTION, POWDER, FOR SOLUTION INTRAMUSCULAR; INTRAVENOUS at 09:36

## 2019-06-28 RX ADMIN — AMITRIPTYLINE HYDROCHLORIDE 25 MG: 25 TABLET, FILM COATED ORAL at 20:29

## 2019-06-28 RX ADMIN — SODIUM CHLORIDE 250 ML: 9 INJECTION, SOLUTION INTRAVENOUS at 15:54

## 2019-06-28 RX ADMIN — FUROSEMIDE 20 MG: 20 TABLET ORAL at 09:37

## 2019-06-28 RX ADMIN — ACETAMINOPHEN 650 MG: 325 TABLET ORAL at 21:04

## 2019-06-28 RX ADMIN — VANCOMYCIN HYDROCHLORIDE 1250 MG: 10 INJECTION, POWDER, LYOPHILIZED, FOR SOLUTION INTRAVENOUS at 18:32

## 2019-06-28 RX ADMIN — MUPIROCIN: 20 OINTMENT TOPICAL at 09:36

## 2019-06-28 RX ADMIN — MEROPENEM 1 G: 1 INJECTION, POWDER, FOR SOLUTION INTRAVENOUS at 00:53

## 2019-06-28 RX ADMIN — MIRTAZAPINE 30 MG: 30 TABLET, FILM COATED ORAL at 20:30

## 2019-06-28 RX ADMIN — METHYLPREDNISOLONE SODIUM SUCCINATE 40 MG: 40 INJECTION, POWDER, FOR SOLUTION INTRAMUSCULAR; INTRAVENOUS at 20:30

## 2019-06-28 RX ADMIN — MEROPENEM 1 G: 1 INJECTION, POWDER, FOR SOLUTION INTRAVENOUS at 15:54

## 2019-06-28 RX ADMIN — TRAZODONE HYDROCHLORIDE 50 MG: 50 TABLET ORAL at 20:30

## 2019-06-28 RX ADMIN — FUROSEMIDE 40 MG: 10 INJECTION, SOLUTION INTRAMUSCULAR; INTRAVENOUS at 09:38

## 2019-06-28 RX ADMIN — INSULIN LISPRO 3 UNITS: 100 INJECTION, SOLUTION INTRAVENOUS; SUBCUTANEOUS at 20:31

## 2019-06-28 RX ADMIN — MEROPENEM 1 G: 1 INJECTION, POWDER, FOR SOLUTION INTRAVENOUS at 23:58

## 2019-06-28 RX ADMIN — MEROPENEM 1 G: 1 INJECTION, POWDER, FOR SOLUTION INTRAVENOUS at 09:36

## 2019-06-28 RX ADMIN — ALBUTEROL SULFATE 2.5 MG: 2.5 SOLUTION RESPIRATORY (INHALATION) at 07:49

## 2019-06-28 ASSESSMENT — PAIN SCALES - GENERAL
PAINLEVEL_OUTOF10: 6
PAINLEVEL_OUTOF10: 0
PAINLEVEL_OUTOF10: 5
PAINLEVEL_OUTOF10: 6
PAINLEVEL_OUTOF10: 8
PAINLEVEL_OUTOF10: 5

## 2019-06-28 ASSESSMENT — PAIN DESCRIPTION - PAIN TYPE
TYPE: ACUTE PAIN
TYPE: ACUTE PAIN

## 2019-06-28 ASSESSMENT — PAIN DESCRIPTION - ONSET: ONSET: ON-GOING

## 2019-06-28 ASSESSMENT — PAIN DESCRIPTION - DESCRIPTORS
DESCRIPTORS: HEADACHE
DESCRIPTORS: HEAVINESS

## 2019-06-28 ASSESSMENT — PAIN DESCRIPTION - LOCATION
LOCATION: HEAD
LOCATION: HEAD

## 2019-06-28 ASSESSMENT — PAIN DESCRIPTION - ORIENTATION: ORIENTATION: MID;ANTERIOR

## 2019-06-28 ASSESSMENT — PAIN - FUNCTIONAL ASSESSMENT: PAIN_FUNCTIONAL_ASSESSMENT: ACTIVITIES ARE NOT PREVENTED

## 2019-06-28 ASSESSMENT — PAIN DESCRIPTION - FREQUENCY: FREQUENCY: INTERMITTENT

## 2019-06-28 ASSESSMENT — PAIN DESCRIPTION - PROGRESSION: CLINICAL_PROGRESSION: NOT CHANGED

## 2019-06-28 NOTE — PROGRESS NOTES
with Vanc and Merrem for 4 days and D/c'ds home on Bactrim     - She has since completed her course of Bactrim and still having symptoms   - Pulmonology consulted  - concerns for possible gram negative and MRSA organisms   - Continue Vanc/Merrem   - for bronch with BAL 6/27  -      Acute on Chronic hypoxic respiratory failure    Transferred to ICU for increased O2. Kept on bilevel overnight. Currently on 8 L of oxygen. Acute on chronic diastolic CHF    - vascular congestion and possible edema on CXR   IV Lasix.     Atrial fibrillation   - Had new onset during last admit with RVR and quickly converted back to NSR  - [id number] is 3, but was not AC given 1 episode which was caused by pneumonia and  resolved quickly   - Continue home BB  - Rate is controlled      Hypothyroidism  - home dose of synthroid continued  - TSH wnl on 6/18     Anxiety  - home meds continued      Morbid Obesity  - Body mass index is 38.99 kg/m². - Complicating assessment and treatment.  Placing patient at risk for multiple co-morbidities as well as early death and contributing to the patient's presentation.   - Counseled on weight loss.     DVT Prophylaxis: Lovenox   Diet: DIET GENERAL  Code Status: Full Code        David Nava MD 6/28/2019 12:06 AM

## 2019-06-28 NOTE — PLAN OF CARE
Problem: Falls - Risk of:  Goal: Will remain free from falls  Description  Will remain free from falls  Outcome: Ongoing  Goal: Absence of physical injury  Description  Absence of physical injury  Outcome: Ongoing     Problem: Breathing Pattern - Ineffective:  Goal: Ability to achieve and maintain a regular respiratory rate will improve  Description  Ability to achieve and maintain a regular respiratory rate will improve  Outcome: Ongoing     Problem: Infection:  Goal: Will remain free from infection  Description  Will remain free from infection  Outcome: Ongoing     Problem: Safety:  Goal: Free from accidental physical injury  Description  Free from accidental physical injury  Outcome: Ongoing  Goal: Free from intentional harm  Description  Free from intentional harm  Outcome: Ongoing     Problem: Daily Care:  Goal: Daily care needs are met  Description  Daily care needs are met  Outcome: Ongoing     Problem: Pain:  Goal: Patient's pain/discomfort is manageable  Description  Patient's pain/discomfort is manageable  Outcome: Ongoing  Goal: Pain level will decrease  Description  Pain level will decrease  Outcome: Ongoing  Goal: Control of acute pain  Description  Control of acute pain  Outcome: Ongoing  Goal: Control of chronic pain  Description  Control of chronic pain  Outcome: Ongoing     Problem: Skin Integrity:  Goal: Skin integrity will stabilize  Description  Skin integrity will stabilize  Outcome: Ongoing     Problem: Discharge Planning:  Goal: Patients continuum of care needs are met  Description  Patients continuum of care needs are met  Outcome: Ongoing

## 2019-06-28 NOTE — PROGRESS NOTES
Inpatient Occupational Therapy  Evaluation and Treatment    Unit: ICU  Date:  6/28/2019  Patient Name:    Adelina Ray  Admitting diagnosis:  Pneumonia [J18.9]  Pneumonia [J18.9]  Pneumonia [J18.9]  Pneumonia [J18.9]  Pneumonia [J18.9]  Admit Date:  6/26/2019  Precautions/Restrictions/WB Status/ Lines/ Wounds/ Oxygen: fall risk, IV, supplemental o2 (8L) and ICU monitoring    Treatment Time:  0662-2555  Treatment Number: 1   Billable Treatment Time: 24 minutes   Total Treatment Time:   34   minutes    Patient Goals for Therapy:  \" go home and get back to doing things \"      Discharge Recommendations: Home w/ 24/7 assist and home therapy  DME needs for discharge: Needs Met-likely needs longer oxygen tubing at home       Therapy recommendations for staff:   Assist of 1 with use of No AD and gait belt for all transfers or ambulation to/from bathroom      Home Health S4 Level Recommendation:  Level 1 Standard  AM-PAC Score: 20    Preadmission Environment    Pt. Lives with spouse & 2 dogs and a cat  Home environment:    apartment   Steps to enter first floor: 3 Steps to enter and One Hand rail  Steps to second floor: N/A  Bathroom: Walk in [employer], [employer], Shower Chair  and comfort height toilet without grab bars   Equipment owned: Shower Chair, [address] [place] [address] Ih-10 chair, home O2 (2 L) continuous, pulse ox and nebulizer               Previously was only wearing O2 at night. Admits to taking O2 off if needing to walk into the bathroom or laundry room because O2 line is not long enough.      Preadmission Status:  Pt. Able to drive: Yes, but has not driven in a few weeks due to recent hospital stays   Pt Fully independent with ADLs: Yes; does not wear O2 in the shower   Pt. Required assistance from family for: Independent PTA (pt and spouse share responsibilities); pt's spouse completes the yard work   Pt.  Fully independent for transfers and gait and walked with No Device  History of falls Yes    Pain  No  Rating:NA  Location:  Pain

## 2019-06-29 LAB
ANCA IFA: NORMAL
BASOPHILS ABSOLUTE: 0 K/UL (ref 0–0.2)
BASOPHILS RELATIVE PERCENT: 0.1 %
CULTURE, RESPIRATORY: NORMAL
CULTURE, RESPIRATORY: NORMAL
EOSINOPHILS ABSOLUTE: 0 K/UL (ref 0–0.6)
EOSINOPHILS RELATIVE PERCENT: 0 %
GLUCOSE BLD-MCNC: 138 MG/DL (ref 70–99)
GLUCOSE BLD-MCNC: 163 MG/DL (ref 70–99)
GLUCOSE BLD-MCNC: 164 MG/DL (ref 70–99)
GLUCOSE BLD-MCNC: 199 MG/DL (ref 70–99)
GRAM STAIN RESULT: NORMAL
GRAM STAIN RESULT: NORMAL
HCT VFR BLD CALC: 32.3 % (ref 36–48)
HEMOGLOBIN: 10.6 G/DL (ref 12–16)
LYMPHOCYTES ABSOLUTE: 1.2 K/UL (ref 1–5.1)
LYMPHOCYTES RELATIVE PERCENT: 13.6 %
MCH RBC QN AUTO: 28 PG (ref 26–34)
MCHC RBC AUTO-ENTMCNC: 33 G/DL (ref 31–36)
MCV RBC AUTO: 85 FL (ref 80–100)
MONOCYTES ABSOLUTE: 0.2 K/UL (ref 0–1.3)
MONOCYTES RELATIVE PERCENT: 2.3 %
NEUTROPHILS ABSOLUTE: 7.6 K/UL (ref 1.7–7.7)
NEUTROPHILS RELATIVE PERCENT: 84 %
PDW BLD-RTO: 16.5 % (ref 12.4–15.4)
PERFORMED ON: ABNORMAL
PLATELET # BLD: 201 K/UL (ref 135–450)
PMV BLD AUTO: 7.4 FL (ref 5–10.5)
RBC # BLD: 3.8 M/UL (ref 4–5.2)
WBC # BLD: 9.1 K/UL (ref 4–11)

## 2019-06-29 PROCEDURE — 6370000000 HC RX 637 (ALT 250 FOR IP): Performed by: INTERNAL MEDICINE

## 2019-06-29 PROCEDURE — 92526 ORAL FUNCTION THERAPY: CPT

## 2019-06-29 PROCEDURE — 85025 COMPLETE CBC W/AUTO DIFF WBC: CPT

## 2019-06-29 PROCEDURE — 94640 AIRWAY INHALATION TREATMENT: CPT

## 2019-06-29 PROCEDURE — 6360000002 HC RX W HCPCS: Performed by: INTERNAL MEDICINE

## 2019-06-29 PROCEDURE — 99233 SBSQ HOSP IP/OBS HIGH 50: CPT | Performed by: INTERNAL MEDICINE

## 2019-06-29 PROCEDURE — 97116 GAIT TRAINING THERAPY: CPT

## 2019-06-29 PROCEDURE — 36415 COLL VENOUS BLD VENIPUNCTURE: CPT

## 2019-06-29 PROCEDURE — 94761 N-INVAS EAR/PLS OXIMETRY MLT: CPT

## 2019-06-29 PROCEDURE — 2700000000 HC OXYGEN THERAPY PER DAY

## 2019-06-29 PROCEDURE — 99232 SBSQ HOSP IP/OBS MODERATE 35: CPT | Performed by: INTERNAL MEDICINE

## 2019-06-29 PROCEDURE — 2060000000 HC ICU INTERMEDIATE R&B

## 2019-06-29 PROCEDURE — 2580000003 HC RX 258: Performed by: INTERNAL MEDICINE

## 2019-06-29 RX ORDER — ALBUTEROL SULFATE 2.5 MG/3ML
2.5 SOLUTION RESPIRATORY (INHALATION) EVERY 4 HOURS
Status: DISCONTINUED | OUTPATIENT
Start: 2019-06-29 | End: 2019-06-29

## 2019-06-29 RX ORDER — FUROSEMIDE 10 MG/ML
40 INJECTION INTRAMUSCULAR; INTRAVENOUS ONCE
Status: COMPLETED | OUTPATIENT
Start: 2019-06-29 | End: 2019-06-29

## 2019-06-29 RX ORDER — ALBUTEROL SULFATE 2.5 MG/3ML
2.5 SOLUTION RESPIRATORY (INHALATION) 3 TIMES DAILY
Status: DISCONTINUED | OUTPATIENT
Start: 2019-06-29 | End: 2019-07-03 | Stop reason: HOSPADM

## 2019-06-29 RX ADMIN — ALBUTEROL SULFATE 2.5 MG: 2.5 SOLUTION RESPIRATORY (INHALATION) at 19:58

## 2019-06-29 RX ADMIN — MEROPENEM 1 G: 1 INJECTION, POWDER, FOR SOLUTION INTRAVENOUS at 08:12

## 2019-06-29 RX ADMIN — AMITRIPTYLINE HYDROCHLORIDE 25 MG: 25 TABLET, FILM COATED ORAL at 21:56

## 2019-06-29 RX ADMIN — METHYLPREDNISOLONE SODIUM SUCCINATE 40 MG: 40 INJECTION, POWDER, FOR SOLUTION INTRAMUSCULAR; INTRAVENOUS at 21:56

## 2019-06-29 RX ADMIN — FUROSEMIDE 40 MG: 10 INJECTION, SOLUTION INTRAMUSCULAR; INTRAVENOUS at 11:35

## 2019-06-29 RX ADMIN — Medication 10 ML: at 21:56

## 2019-06-29 RX ADMIN — LEVOTHYROXINE SODIUM 150 MCG: 150 TABLET ORAL at 05:56

## 2019-06-29 RX ADMIN — METHYLPREDNISOLONE SODIUM SUCCINATE 40 MG: 40 INJECTION, POWDER, FOR SOLUTION INTRAMUSCULAR; INTRAVENOUS at 08:12

## 2019-06-29 RX ADMIN — ENOXAPARIN SODIUM 40 MG: 100 INJECTION SUBCUTANEOUS at 16:56

## 2019-06-29 RX ADMIN — NADOLOL 40 MG: 40 TABLET ORAL at 21:56

## 2019-06-29 RX ADMIN — LORAZEPAM 0.5 MG: 0.5 TABLET ORAL at 15:48

## 2019-06-29 RX ADMIN — ACETAMINOPHEN 650 MG: 325 TABLET ORAL at 11:35

## 2019-06-29 RX ADMIN — MUPIROCIN: 20 OINTMENT TOPICAL at 22:00

## 2019-06-29 RX ADMIN — ACETAMINOPHEN 650 MG: 325 TABLET ORAL at 19:40

## 2019-06-29 RX ADMIN — ALBUTEROL SULFATE 2.5 MG: 2.5 SOLUTION RESPIRATORY (INHALATION) at 15:01

## 2019-06-29 RX ADMIN — ASPIRIN 81 MG 81 MG: 81 TABLET ORAL at 08:11

## 2019-06-29 RX ADMIN — MEROPENEM 1 G: 1 INJECTION, POWDER, FOR SOLUTION INTRAVENOUS at 23:35

## 2019-06-29 RX ADMIN — FUROSEMIDE 20 MG: 20 TABLET ORAL at 08:16

## 2019-06-29 RX ADMIN — MIRTAZAPINE 30 MG: 30 TABLET, FILM COATED ORAL at 21:56

## 2019-06-29 RX ADMIN — TRAZODONE HYDROCHLORIDE 50 MG: 50 TABLET ORAL at 21:59

## 2019-06-29 RX ADMIN — ACETAMINOPHEN 650 MG: 325 TABLET ORAL at 05:57

## 2019-06-29 RX ADMIN — SERTRALINE 200 MG: 100 TABLET, FILM COATED ORAL at 08:11

## 2019-06-29 RX ADMIN — INSULIN LISPRO 1 UNITS: 100 INJECTION, SOLUTION INTRAVENOUS; SUBCUTANEOUS at 16:57

## 2019-06-29 RX ADMIN — MUPIROCIN: 20 OINTMENT TOPICAL at 08:22

## 2019-06-29 RX ADMIN — MEROPENEM 1 G: 1 INJECTION, POWDER, FOR SOLUTION INTRAVENOUS at 15:41

## 2019-06-29 RX ADMIN — INSULIN LISPRO 1 UNITS: 100 INJECTION, SOLUTION INTRAVENOUS; SUBCUTANEOUS at 11:36

## 2019-06-29 RX ADMIN — INSULIN LISPRO 1 UNITS: 100 INJECTION, SOLUTION INTRAVENOUS; SUBCUTANEOUS at 21:56

## 2019-06-29 ASSESSMENT — PAIN DESCRIPTION - PAIN TYPE
TYPE: ACUTE PAIN

## 2019-06-29 ASSESSMENT — PAIN - FUNCTIONAL ASSESSMENT
PAIN_FUNCTIONAL_ASSESSMENT: ACTIVITIES ARE NOT PREVENTED
PAIN_FUNCTIONAL_ASSESSMENT: ACTIVITIES ARE NOT PREVENTED

## 2019-06-29 ASSESSMENT — PAIN DESCRIPTION - FREQUENCY
FREQUENCY: INTERMITTENT

## 2019-06-29 ASSESSMENT — PAIN DESCRIPTION - DESCRIPTORS
DESCRIPTORS: HEADACHE
DESCRIPTORS: HEADACHE;HEAVINESS
DESCRIPTORS: HEADACHE

## 2019-06-29 ASSESSMENT — PAIN DESCRIPTION - ORIENTATION
ORIENTATION: INNER
ORIENTATION: INNER

## 2019-06-29 ASSESSMENT — PAIN DESCRIPTION - ONSET
ONSET: GRADUAL

## 2019-06-29 ASSESSMENT — PAIN DESCRIPTION - LOCATION
LOCATION: HEAD
LOCATION: CHEST;HEAD
LOCATION: HEAD

## 2019-06-29 ASSESSMENT — PAIN SCALES - GENERAL
PAINLEVEL_OUTOF10: 4
PAINLEVEL_OUTOF10: 0
PAINLEVEL_OUTOF10: 1
PAINLEVEL_OUTOF10: 6
PAINLEVEL_OUTOF10: 4
PAINLEVEL_OUTOF10: 4

## 2019-06-29 ASSESSMENT — PAIN DESCRIPTION - DIRECTION
RADIATING_TOWARDS: NON
RADIATING_TOWARDS: NON

## 2019-06-29 ASSESSMENT — PAIN DESCRIPTION - PROGRESSION
CLINICAL_PROGRESSION: NOT CHANGED
CLINICAL_PROGRESSION: NOT CHANGED

## 2019-06-29 NOTE — PLAN OF CARE
Problem: Falls - Risk of:  Goal: Will remain free from falls  Description  Will remain free from falls  Outcome: Ongoing  Note:   Pt will remain free of falls during shift. Fall Precautions in place. Bed locked in lowest position. Bed alarm engaged. Call light within reach. Goal: Absence of physical injury  Description  Absence of physical injury  Outcome: Ongoing     Problem: Breathing Pattern - Ineffective:  Goal: Ability to achieve and maintain a regular respiratory rate will improve  Description  Ability to achieve and maintain a regular respiratory rate will improve  Outcome: Ongoing  Note:   VSS on 6L O2 high flow nasal canula. Continuous pulse ox in use. Wean as tolerated. Problem: Infection:  Goal: Will remain free from infection  Description  Will remain free from infection  Outcome: Ongoing     Problem: Daily Care:  Goal: Daily care needs are met  Description  Daily care needs are met  Outcome: Ongoing     Problem: Skin Integrity:  Goal: Skin integrity will stabilize  Description  Skin integrity will stabilize  Outcome: Ongoing  Note:   Pt remain free of skin breakdown during hospital stay. Pt encouraged to self turn frequently. Will assess skin per shift or as needed.

## 2019-06-29 NOTE — PROGRESS NOTES
Pulmonary Progress Note  CC: abnormal cxr    Subjective:  6lpm O2, some SOB. Cough with yellow sputum. EXAM: /66   Pulse 70   Temp 97.3 °F (36.3 °C) (Oral)   Resp 18   Ht 5' 0.35\" (1.533 m)   Wt 202 lb 6.4 oz (91.8 kg)   SpO2 91%   BMI 39.07 kg/m²  on 6lpm nc  Constitutional:  No acute distress. Eyes: PERRL. Conjunctivae anicteric. ENT: Normal nose. Normal tongue. Neck:  Trachea is midline. No thyroid tenderness. Respiratory: No accessory muscle usage. Decreased breath sounds. No wheezes. No rales. RLL Rhonchi. Cardiovascular: Normal S1S2. No digit clubbing. No digit cyanosis. No LE edema. Psychiatric: No anxiety or Agitation. Alert and Oriented to person, place and time.     Scheduled Meds:   albuterol  2.5 mg Nebulization Q4H    mupirocin   Nasal BID    methylPREDNISolone  40 mg Intravenous Q12H    insulin lispro  0-6 Units Subcutaneous TID WC    insulin lispro  0-3 Units Subcutaneous Nightly    amitriptyline  25 mg Oral Nightly    aspirin  81 mg Oral Daily    levothyroxine  150 mcg Oral QAM AC    mirtazapine  30 mg Oral Nightly    sertraline  200 mg Oral Daily    traZODone  50 mg Oral Nightly    sodium chloride flush  10 mL Intravenous 2 times per day    enoxaparin  40 mg Subcutaneous Daily    meropenem  1 g Intravenous Q8H    vancomycin  1,250 mg Intravenous Q24H    nadolol  40 mg Oral Nightly    furosemide  20 mg Oral Daily     Continuous Infusions:   dextrose       PRN Meds:  glucose, dextrose, glucagon (rDNA), dextrose, albuterol sulfate HFA, LORazepam, sodium chloride flush, magnesium hydroxide, ondansetron, acetaminophen    Labs:  CBC:   Recent Labs     06/27/19  0527 06/28/19  0403 06/29/19  0432   WBC 8.3 9.1 9.1   HGB 10.8* 10.7* 10.6*   HCT 34.7* 32.6* 32.3*   MCV 90.5 84.3 85.0    205 201     BMP:   Recent Labs     06/27/19  0527 06/28/19  0403    140   K 3.7 3.7    104   CO2 18* 23   PHOS  --  3.1   BUN 8 12   CREATININE 0.6 0.6

## 2019-06-30 ENCOUNTER — APPOINTMENT (OUTPATIENT)
Dept: GENERAL RADIOLOGY | Age: 64
DRG: 177 | End: 2019-06-30
Payer: COMMERCIAL

## 2019-06-30 LAB
ALBUMIN SERPL-MCNC: 2.9 G/DL (ref 3.4–5)
ANION GAP SERPL CALCULATED.3IONS-SCNC: 11 MMOL/L (ref 3–16)
BUN BLDV-MCNC: 12 MG/DL (ref 7–20)
CALCIUM SERPL-MCNC: 8.4 MG/DL (ref 8.3–10.6)
CHLORIDE BLD-SCNC: 99 MMOL/L (ref 99–110)
CO2: 26 MMOL/L (ref 21–32)
CREAT SERPL-MCNC: <0.5 MG/DL (ref 0.6–1.2)
GFR AFRICAN AMERICAN: >60
GFR NON-AFRICAN AMERICAN: >60
GLUCOSE BLD-MCNC: 125 MG/DL (ref 70–99)
GLUCOSE BLD-MCNC: 159 MG/DL (ref 70–99)
GLUCOSE BLD-MCNC: 183 MG/DL (ref 70–99)
GLUCOSE BLD-MCNC: 188 MG/DL (ref 70–99)
GLUCOSE BLD-MCNC: 210 MG/DL (ref 70–99)
PERFORMED ON: ABNORMAL
PHOSPHORUS: 2.3 MG/DL (ref 2.5–4.9)
POTASSIUM SERPL-SCNC: 3.1 MMOL/L (ref 3.5–5.1)
SODIUM BLD-SCNC: 136 MMOL/L (ref 136–145)

## 2019-06-30 PROCEDURE — 36415 COLL VENOUS BLD VENIPUNCTURE: CPT

## 2019-06-30 PROCEDURE — 94761 N-INVAS EAR/PLS OXIMETRY MLT: CPT

## 2019-06-30 PROCEDURE — 99233 SBSQ HOSP IP/OBS HIGH 50: CPT | Performed by: INTERNAL MEDICINE

## 2019-06-30 PROCEDURE — 6360000002 HC RX W HCPCS: Performed by: INTERNAL MEDICINE

## 2019-06-30 PROCEDURE — 2700000000 HC OXYGEN THERAPY PER DAY

## 2019-06-30 PROCEDURE — 2060000000 HC ICU INTERMEDIATE R&B

## 2019-06-30 PROCEDURE — 80069 RENAL FUNCTION PANEL: CPT

## 2019-06-30 PROCEDURE — 99232 SBSQ HOSP IP/OBS MODERATE 35: CPT | Performed by: INTERNAL MEDICINE

## 2019-06-30 PROCEDURE — 2580000003 HC RX 258: Performed by: INTERNAL MEDICINE

## 2019-06-30 PROCEDURE — 94640 AIRWAY INHALATION TREATMENT: CPT

## 2019-06-30 PROCEDURE — 71046 X-RAY EXAM CHEST 2 VIEWS: CPT

## 2019-06-30 PROCEDURE — 6370000000 HC RX 637 (ALT 250 FOR IP): Performed by: INTERNAL MEDICINE

## 2019-06-30 RX ORDER — FUROSEMIDE 10 MG/ML
40 INJECTION INTRAMUSCULAR; INTRAVENOUS DAILY
Status: DISCONTINUED | OUTPATIENT
Start: 2019-07-01 | End: 2019-07-03 | Stop reason: HOSPADM

## 2019-06-30 RX ORDER — POTASSIUM CHLORIDE 20 MEQ/1
40 TABLET, EXTENDED RELEASE ORAL ONCE
Status: COMPLETED | OUTPATIENT
Start: 2019-06-30 | End: 2019-06-30

## 2019-06-30 RX ORDER — GUAIFENESIN 600 MG/1
600 TABLET, EXTENDED RELEASE ORAL 2 TIMES DAILY
Status: DISCONTINUED | OUTPATIENT
Start: 2019-06-30 | End: 2019-07-03 | Stop reason: HOSPADM

## 2019-06-30 RX ORDER — FUROSEMIDE 10 MG/ML
40 INJECTION INTRAMUSCULAR; INTRAVENOUS ONCE
Status: COMPLETED | OUTPATIENT
Start: 2019-06-30 | End: 2019-06-30

## 2019-06-30 RX ADMIN — FUROSEMIDE 40 MG: 10 INJECTION, SOLUTION INTRAMUSCULAR; INTRAVENOUS at 11:10

## 2019-06-30 RX ADMIN — METHYLPREDNISOLONE SODIUM SUCCINATE 40 MG: 40 INJECTION, POWDER, FOR SOLUTION INTRAMUSCULAR; INTRAVENOUS at 09:34

## 2019-06-30 RX ADMIN — MEROPENEM 1 G: 1 INJECTION, POWDER, FOR SOLUTION INTRAVENOUS at 17:49

## 2019-06-30 RX ADMIN — ACETAMINOPHEN 650 MG: 325 TABLET ORAL at 21:48

## 2019-06-30 RX ADMIN — INSULIN LISPRO 2 UNITS: 100 INJECTION, SOLUTION INTRAVENOUS; SUBCUTANEOUS at 17:50

## 2019-06-30 RX ADMIN — POTASSIUM CHLORIDE 40 MEQ: 20 TABLET, EXTENDED RELEASE ORAL at 09:33

## 2019-06-30 RX ADMIN — LEVOTHYROXINE SODIUM 150 MCG: 150 TABLET ORAL at 06:06

## 2019-06-30 RX ADMIN — ALBUTEROL SULFATE 2.5 MG: 2.5 SOLUTION RESPIRATORY (INHALATION) at 13:18

## 2019-06-30 RX ADMIN — ACETAMINOPHEN 650 MG: 325 TABLET ORAL at 09:32

## 2019-06-30 RX ADMIN — ACETAMINOPHEN 650 MG: 325 TABLET ORAL at 02:19

## 2019-06-30 RX ADMIN — INSULIN LISPRO 1 UNITS: 100 INJECTION, SOLUTION INTRAVENOUS; SUBCUTANEOUS at 12:33

## 2019-06-30 RX ADMIN — INSULIN LISPRO 1 UNITS: 100 INJECTION, SOLUTION INTRAVENOUS; SUBCUTANEOUS at 09:01

## 2019-06-30 RX ADMIN — ASPIRIN 81 MG 81 MG: 81 TABLET ORAL at 09:33

## 2019-06-30 RX ADMIN — TRAZODONE HYDROCHLORIDE 50 MG: 50 TABLET ORAL at 21:48

## 2019-06-30 RX ADMIN — Medication 10 ML: at 21:48

## 2019-06-30 RX ADMIN — MIRTAZAPINE 30 MG: 30 TABLET, FILM COATED ORAL at 21:49

## 2019-06-30 RX ADMIN — GUAIFENESIN 600 MG: 600 TABLET, EXTENDED RELEASE ORAL at 11:12

## 2019-06-30 RX ADMIN — NADOLOL 40 MG: 40 TABLET ORAL at 21:48

## 2019-06-30 RX ADMIN — POTASSIUM CHLORIDE 40 MEQ: 20 TABLET, EXTENDED RELEASE ORAL at 11:57

## 2019-06-30 RX ADMIN — LORAZEPAM 0.5 MG: 0.5 TABLET ORAL at 09:33

## 2019-06-30 RX ADMIN — ALBUTEROL SULFATE 2.5 MG: 2.5 SOLUTION RESPIRATORY (INHALATION) at 20:16

## 2019-06-30 RX ADMIN — MUPIROCIN: 20 OINTMENT TOPICAL at 09:39

## 2019-06-30 RX ADMIN — LORAZEPAM 0.5 MG: 0.5 TABLET ORAL at 02:19

## 2019-06-30 RX ADMIN — ALBUTEROL SULFATE 2.5 MG: 2.5 SOLUTION RESPIRATORY (INHALATION) at 07:05

## 2019-06-30 RX ADMIN — METHYLPREDNISOLONE SODIUM SUCCINATE 40 MG: 40 INJECTION, POWDER, FOR SOLUTION INTRAMUSCULAR; INTRAVENOUS at 21:48

## 2019-06-30 RX ADMIN — MEROPENEM 1 G: 1 INJECTION, POWDER, FOR SOLUTION INTRAVENOUS at 09:00

## 2019-06-30 RX ADMIN — AMITRIPTYLINE HYDROCHLORIDE 25 MG: 25 TABLET, FILM COATED ORAL at 21:49

## 2019-06-30 RX ADMIN — SERTRALINE 200 MG: 100 TABLET, FILM COATED ORAL at 09:33

## 2019-06-30 RX ADMIN — MEROPENEM 1 G: 1 INJECTION, POWDER, FOR SOLUTION INTRAVENOUS at 23:54

## 2019-06-30 RX ADMIN — ENOXAPARIN SODIUM 40 MG: 100 INJECTION SUBCUTANEOUS at 17:49

## 2019-06-30 RX ADMIN — GUAIFENESIN 600 MG: 600 TABLET, EXTENDED RELEASE ORAL at 21:48

## 2019-06-30 ASSESSMENT — PAIN SCALES - GENERAL
PAINLEVEL_OUTOF10: 3
PAINLEVEL_OUTOF10: 5
PAINLEVEL_OUTOF10: 3

## 2019-06-30 ASSESSMENT — PAIN DESCRIPTION - FREQUENCY
FREQUENCY: INTERMITTENT
FREQUENCY: INTERMITTENT

## 2019-06-30 ASSESSMENT — PAIN DESCRIPTION - PROGRESSION
CLINICAL_PROGRESSION: NOT CHANGED
CLINICAL_PROGRESSION: NOT CHANGED

## 2019-06-30 ASSESSMENT — PAIN DESCRIPTION - ONSET
ONSET: GRADUAL
ONSET: GRADUAL

## 2019-06-30 ASSESSMENT — PAIN DESCRIPTION - LOCATION
LOCATION: HEAD
LOCATION: HEAD

## 2019-06-30 ASSESSMENT — PAIN DESCRIPTION - DESCRIPTORS
DESCRIPTORS: HEADACHE
DESCRIPTORS: HEADACHE

## 2019-06-30 ASSESSMENT — PAIN DESCRIPTION - PAIN TYPE
TYPE: ACUTE PAIN
TYPE: ACUTE PAIN

## 2019-06-30 ASSESSMENT — PAIN DESCRIPTION - ORIENTATION
ORIENTATION: ANTERIOR
ORIENTATION: ANTERIOR

## 2019-06-30 NOTE — PROGRESS NOTES
Pt's O2 saturation dropped to 85% with ambulation to the bathroom while on 6L. She did recover to 92% once back in bed.

## 2019-06-30 NOTE — PROGRESS NOTES
BRONCHOSCOPY N/A 6/27/2019    EBUS WF W/ANES. performed by Kimi Rodríguez MD at [address] [place] [address]  6/27/2019    BRONCHOSCOPY/TRANSBRONCHIAL NEEDLE BIOPSY performed by Kimi Rodríguez MD at [address] [place] [address]  6/27/2019    BRONCHOSCOPY/TRANSBRONCHIAL LUNG BIOPSY performed by Kimi Rodríguez MD at [address] [place] [address]  6/27/2019    BRONCHOSCOPY ALVEOLAR LAVAGE performed by Kimi Rodríguez MD at [facility], TOTAL ABDOMINAL         Level of Consciousness: Alert, Oriented, and Cooperative = 0    Level of Activity: Walking with assistance = 1    Respiratory Pattern: Regular Pattern; RR 8-20 = 0    Breath Sounds: Diminshed bilaterally and/or crackles = 2    Sputum  Sputum Color: Yellow, Tenacity: Thick, Sputum How Obtained: Spontaneous cough  Cough: Strong, spontaneous, non-productive = 0    Vital Signs   /83   Pulse 92   Temp 96.7 °F (35.9 °C) (Oral)   Resp 16   Ht 5' 0.35\" (1.533 m)   Wt 202 lb 1.6 oz (91.7 kg)   SpO2 93%   BMI 39.01 kg/m²   SPO2 (COPD values may differ): 86-87% on room air or greater than 92% on FiO2 35- 50% = 3    Peak Flow (asthma only): not applicable = 0    RSI: 5-6 = Q4hr PRN (every four hours as needed) for dyspnea        Plan       Goals: medication delivery, mobilize retained secretions, volume expansion and improve oxygenation    Patient/caregiver was educated on the proper method of use for Respiratory Care Devices:  Yes      Level of patient/caregiver understanding able to:   ? Verbalize understanding   ? Demonstrate understanding       ? Teach back        ? Needs reinforcement       ? No available caregiver               ? Other:     Response to education:  Excellent     Is patient being placed on Home Treatment Regimen? NA     Does the patient have everything they need prior to discharge?   NA     Comments: chart reviewed and pt assessed    Plan of Care: pt to remain on current regiment    Electronically signed by Slime High RCP on 6/30/2019 at 11:11 AM    Respiratory Protocol Guidelines     1. Assessment and treatment by Respiratory Therapy will be initiated for medication and therapeutic interventions upon initiation of aerosolized medication. 2. Physician will be contacted for respiratory rate (RR) greater than 35 breaths per minute. Therapy will be held for heart rate (HR) greater than 140 beats per minute, pending direction from physician. 3. Bronchodilators will be administered via Metered Dose Inhaler (MDI) with spacer when the following criteria are met:  a. Alert and cooperative     b. HR < 140 bpm  c. RR < 30 bpm                d. Can demonstrate a 2-3 second inspiratory hold  4. Bronchodilators will be administered via Hand Held Nebulizer [facility]) to patients when ANY of the following criteria are met  a. Incognizant or uncooperative          b. Patients treated with HHN at Home        c. Unable to demonstrate proper use of MDI with spacer     d. RR > 30 bpm   5. Bronchodilators will be delivered via Metered Dose Inhaler (MDI), HHN, Aerogen to intubated patients on mechanical ventilation. 6. Inhalation medication orders will be delivered and/or substituted as outlined below. Aerosolized Medications Ordering and Administration Guidelines:    1. All Medications will be ordered by a physician, and their frequency and/or modality will be adjusted as defined by the patients Respiratory Severity Index (RSI) score. 2. If the patient does not have documented COPD, consider discontinuing anticholinergics when RSI is less than 9.  3. If the bronchospasm worsens (increased RSI), then the bronchodilator frequency can be increased to a maximum of every 4 hours. If greater than every 4 hours is required, the physician will be contacted.   4. If the bronchospasm improves, the frequency of the bronchodilator can be decreased, based on the patient's RSI, but not less than home treatment

## 2019-07-01 LAB
ANION GAP SERPL CALCULATED.3IONS-SCNC: 13 MMOL/L (ref 3–16)
BLOOD CULTURE, ROUTINE: NORMAL
BUN BLDV-MCNC: 11 MG/DL (ref 7–20)
CALCIUM SERPL-MCNC: 8.8 MG/DL (ref 8.3–10.6)
CHLORIDE BLD-SCNC: 97 MMOL/L (ref 99–110)
CO2: 28 MMOL/L (ref 21–32)
CREAT SERPL-MCNC: <0.5 MG/DL (ref 0.6–1.2)
CULTURE, BLOOD 2: NORMAL
GFR AFRICAN AMERICAN: >60
GFR NON-AFRICAN AMERICAN: >60
GLUCOSE BLD-MCNC: 132 MG/DL (ref 70–99)
GLUCOSE BLD-MCNC: 138 MG/DL (ref 70–99)
GLUCOSE BLD-MCNC: 152 MG/DL (ref 70–99)
GLUCOSE BLD-MCNC: 195 MG/DL (ref 70–99)
GLUCOSE BLD-MCNC: 201 MG/DL (ref 70–99)
PERFORMED ON: ABNORMAL
POTASSIUM SERPL-SCNC: 3.7 MMOL/L (ref 3.5–5.1)
SODIUM BLD-SCNC: 138 MMOL/L (ref 136–145)

## 2019-07-01 PROCEDURE — 2060000000 HC ICU INTERMEDIATE R&B

## 2019-07-01 PROCEDURE — 6360000002 HC RX W HCPCS: Performed by: INTERNAL MEDICINE

## 2019-07-01 PROCEDURE — 97110 THERAPEUTIC EXERCISES: CPT

## 2019-07-01 PROCEDURE — 97530 THERAPEUTIC ACTIVITIES: CPT

## 2019-07-01 PROCEDURE — 97535 SELF CARE MNGMENT TRAINING: CPT

## 2019-07-01 PROCEDURE — 36415 COLL VENOUS BLD VENIPUNCTURE: CPT

## 2019-07-01 PROCEDURE — 6370000000 HC RX 637 (ALT 250 FOR IP): Performed by: INTERNAL MEDICINE

## 2019-07-01 PROCEDURE — 2700000000 HC OXYGEN THERAPY PER DAY

## 2019-07-01 PROCEDURE — 2580000003 HC RX 258: Performed by: INTERNAL MEDICINE

## 2019-07-01 PROCEDURE — 99232 SBSQ HOSP IP/OBS MODERATE 35: CPT | Performed by: INTERNAL MEDICINE

## 2019-07-01 PROCEDURE — 94761 N-INVAS EAR/PLS OXIMETRY MLT: CPT

## 2019-07-01 PROCEDURE — 97116 GAIT TRAINING THERAPY: CPT

## 2019-07-01 PROCEDURE — 80048 BASIC METABOLIC PNL TOTAL CA: CPT

## 2019-07-01 PROCEDURE — 94640 AIRWAY INHALATION TREATMENT: CPT

## 2019-07-01 RX ADMIN — ACETAMINOPHEN 650 MG: 325 TABLET ORAL at 05:44

## 2019-07-01 RX ADMIN — METHYLPREDNISOLONE SODIUM SUCCINATE 40 MG: 40 INJECTION, POWDER, FOR SOLUTION INTRAMUSCULAR; INTRAVENOUS at 08:44

## 2019-07-01 RX ADMIN — FUROSEMIDE 40 MG: 10 INJECTION, SOLUTION INTRAMUSCULAR; INTRAVENOUS at 08:44

## 2019-07-01 RX ADMIN — INSULIN LISPRO 1 UNITS: 100 INJECTION, SOLUTION INTRAVENOUS; SUBCUTANEOUS at 08:50

## 2019-07-01 RX ADMIN — TRAZODONE HYDROCHLORIDE 50 MG: 50 TABLET ORAL at 22:18

## 2019-07-01 RX ADMIN — ENOXAPARIN SODIUM 40 MG: 100 INJECTION SUBCUTANEOUS at 18:29

## 2019-07-01 RX ADMIN — MIRTAZAPINE 30 MG: 30 TABLET, FILM COATED ORAL at 22:19

## 2019-07-01 RX ADMIN — Medication 10 ML: at 22:18

## 2019-07-01 RX ADMIN — ALBUTEROL SULFATE 2.5 MG: 2.5 SOLUTION RESPIRATORY (INHALATION) at 19:42

## 2019-07-01 RX ADMIN — AMITRIPTYLINE HYDROCHLORIDE 25 MG: 25 TABLET, FILM COATED ORAL at 22:18

## 2019-07-01 RX ADMIN — MEROPENEM 1 G: 1 INJECTION, POWDER, FOR SOLUTION INTRAVENOUS at 08:45

## 2019-07-01 RX ADMIN — MEROPENEM 1 G: 1 INJECTION, POWDER, FOR SOLUTION INTRAVENOUS at 18:29

## 2019-07-01 RX ADMIN — NADOLOL 40 MG: 40 TABLET ORAL at 22:18

## 2019-07-01 RX ADMIN — ACETAMINOPHEN 650 MG: 325 TABLET ORAL at 13:34

## 2019-07-01 RX ADMIN — ACETAMINOPHEN 650 MG: 325 TABLET ORAL at 22:17

## 2019-07-01 RX ADMIN — GUAIFENESIN 600 MG: 600 TABLET, EXTENDED RELEASE ORAL at 08:44

## 2019-07-01 RX ADMIN — METHYLPREDNISOLONE SODIUM SUCCINATE 40 MG: 40 INJECTION, POWDER, FOR SOLUTION INTRAMUSCULAR; INTRAVENOUS at 22:18

## 2019-07-01 RX ADMIN — ALBUTEROL SULFATE 2.5 MG: 2.5 SOLUTION RESPIRATORY (INHALATION) at 07:12

## 2019-07-01 RX ADMIN — LORAZEPAM 0.5 MG: 0.5 TABLET ORAL at 13:34

## 2019-07-01 RX ADMIN — Medication 10 ML: at 08:44

## 2019-07-01 RX ADMIN — INSULIN LISPRO 2 UNITS: 100 INJECTION, SOLUTION INTRAVENOUS; SUBCUTANEOUS at 13:10

## 2019-07-01 RX ADMIN — LEVOTHYROXINE SODIUM 150 MCG: 150 TABLET ORAL at 05:39

## 2019-07-01 RX ADMIN — GUAIFENESIN 600 MG: 600 TABLET, EXTENDED RELEASE ORAL at 22:18

## 2019-07-01 RX ADMIN — ALBUTEROL SULFATE 2.5 MG: 2.5 SOLUTION RESPIRATORY (INHALATION) at 13:24

## 2019-07-01 RX ADMIN — SERTRALINE 200 MG: 100 TABLET, FILM COATED ORAL at 08:44

## 2019-07-01 RX ADMIN — ASPIRIN 81 MG 81 MG: 81 TABLET ORAL at 08:44

## 2019-07-01 ASSESSMENT — PAIN SCALES - GENERAL
PAINLEVEL_OUTOF10: 3
PAINLEVEL_OUTOF10: 6
PAINLEVEL_OUTOF10: 3
PAINLEVEL_OUTOF10: 3

## 2019-07-01 ASSESSMENT — PAIN DESCRIPTION - ORIENTATION: ORIENTATION: OTHER (COMMENT)

## 2019-07-01 ASSESSMENT — PAIN DESCRIPTION - LOCATION: LOCATION: HEAD;CHEST

## 2019-07-01 ASSESSMENT — PAIN DESCRIPTION - DESCRIPTORS: DESCRIPTORS: THROBBING;SHARP

## 2019-07-01 ASSESSMENT — PAIN DESCRIPTION - PAIN TYPE: TYPE: ACUTE PAIN

## 2019-07-01 ASSESSMENT — PAIN DESCRIPTION - FREQUENCY: FREQUENCY: INTERMITTENT

## 2019-07-01 NOTE — PROGRESS NOTES
Meds:  glucose, dextrose, glucagon (rDNA), dextrose, albuterol sulfate HFA, LORazepam, sodium chloride flush, magnesium hydroxide, ondansetron, acetaminophen    Labs:  CBC:   Recent Labs     06/29/19  0432   WBC 9.1   HGB 10.6*   HCT 32.3*   MCV 85.0        BMP:   Recent Labs     06/30/19  0434      K 3.1*   CL 99   CO2 26   PHOS 2.3*   BUN 12   CREATININE <0.5*     LIVER PROFILE: No results for input(s): AST, ALT, LIPASE, BILIDIR, BILITOT, ALKPHOS in the last 72 hours. Invalid input(s): AMYLASE,  ALB  PT/INR: No results for input(s): PROTIME, INR in the last 72 hours. APTT: No results for input(s): APTT in the last 72 hours. UA:No results for input(s): NITRITE, COLORU, PHUR, LABCAST, WBCUA, RBCUA, MUCUS, TRICHOMONAS, YEAST, BACTERIA, CLARITYU, SPECGRAV, LEUKOCYTESUR, UROBILINOGEN, BILIRUBINUR, BLOODU, GLUCOSEU, AMORPHOUS in the last 72 hours. Invalid input(s): KETONESU  No results for input(s): PHART, EIU0ZTS, PO2ART in the last 72 hours. Cultures:   BAL 6/287NGTD     Films:  CXR  6/30 reviewed by me and showed   Multifocal airspace disease not significantly changed    Left upper lobe, transbronchial biopsies:  - Fragments of benign bronchial wall. - Negative for granulomas or other significant inflammation.  - Negative for malignancy. ASSESSMENT:  · Acute hypoxemic respiratory failure   · Abnormal CT chest- DDx atypical infection, aspiration pneumonitis, sarcoidosis, , HP, CT ILD, vasculitis, NSIP, less likely lymphoma and pulmonary edema. SHAZIA Bx 6/27 nondiagnostic  · Atrial fibrillation and grade 2 diastolic dysfunction  · Restrictive lung disease        PLAN:  · Supplemental oxygen to maintain SaO2 >92%; wean as tolerated  · Trial of IV Solu-Medrol 40 every 12  · Meropenem IV day #6.   Completed 3 days of vancomycin  · Follow-up cultures  · IV Lasix and follow-up renal

## 2019-07-01 NOTE — PROGRESS NOTES
present.   Systolic pulmonary artery pressure (SPAP) estimated at 42mmHg (RA pressure   8mmHg), consistent with mild pulmonary hypertension. CXR 6/30   Multifocal pneumonia, unchanged.     ASSESSMENT/PLAN:    Pneumonia  - Recent admission for the same. Had already completed a course of Levaquin and had worsening symptoms and was treated with Vanc and Merrem for 4 days and D/c'ds home on Bactrim     - She has since completed her course of Bactrim and still having symptoms   - Pulmonology consulted  - concerns for possible gram negative and MRSA organisms   - Continue Vanc/Merrem   - for bronch with BAL 6/27  No growth in cultures   vanc d/c ed   CXR 6/30- multifocal pna      Acute on Chronic hypoxic respiratory failure - no history of smoking or prior lung disease  Transferred to ICU for increased O2 last week. She is off Bipap. Currently on 6 L of oxygen. Acute on chronic diastolic CHF  - vascular congestion and possible edema on CXR. one dose of iv lasix. ECHO showed diastolic dysfunction. Continue Oral  Lasix.     Atrial fibrillation   - Had new onset during last admit with RVR and quickly converted back to NSR  - [id number] is 3, but was not AC given 1 episode which was caused by pneumonia and  resolved quickly   - Continue home BB  - Rate is controlled      Hypothyroidism  - home dose of synthroid continued  - TSH wnl on 6/18     Anxiety  - home meds continued      Morbid Obesity  - Body mass index is 38.99 kg/m². - Complicating assessment and treatment.  Placing patient at risk for multiple co-morbidities as well as early death and contributing to the patient's presentation.   - Counseled on weight loss.     DVT Prophylaxis: Lovenox   Diet: DIET GENERAL  Code Status: Full Code        Simona Delong MD 7/1/2019 9:45 AM

## 2019-07-02 LAB
ANION GAP SERPL CALCULATED.3IONS-SCNC: 13 MMOL/L (ref 3–16)
BUN BLDV-MCNC: 12 MG/DL (ref 7–20)
CALCIUM SERPL-MCNC: 8.5 MG/DL (ref 8.3–10.6)
CHLORIDE BLD-SCNC: 99 MMOL/L (ref 99–110)
CO2: 26 MMOL/L (ref 21–32)
CREAT SERPL-MCNC: <0.5 MG/DL (ref 0.6–1.2)
GFR AFRICAN AMERICAN: >60
GFR NON-AFRICAN AMERICAN: >60
GLUCOSE BLD-MCNC: 146 MG/DL (ref 70–99)
GLUCOSE BLD-MCNC: 168 MG/DL (ref 70–99)
GLUCOSE BLD-MCNC: 180 MG/DL (ref 70–99)
GLUCOSE BLD-MCNC: 186 MG/DL (ref 70–99)
GLUCOSE BLD-MCNC: 225 MG/DL (ref 70–99)
GLUCOSE BLD-MCNC: 225 MG/DL (ref 70–99)
PERFORMED ON: ABNORMAL
POTASSIUM SERPL-SCNC: 3.8 MMOL/L (ref 3.5–5.1)
SODIUM BLD-SCNC: 138 MMOL/L (ref 136–145)

## 2019-07-02 PROCEDURE — 94640 AIRWAY INHALATION TREATMENT: CPT

## 2019-07-02 PROCEDURE — 99232 SBSQ HOSP IP/OBS MODERATE 35: CPT | Performed by: INTERNAL MEDICINE

## 2019-07-02 PROCEDURE — 6360000002 HC RX W HCPCS: Performed by: INTERNAL MEDICINE

## 2019-07-02 PROCEDURE — 6370000000 HC RX 637 (ALT 250 FOR IP): Performed by: INTERNAL MEDICINE

## 2019-07-02 PROCEDURE — 2700000000 HC OXYGEN THERAPY PER DAY

## 2019-07-02 PROCEDURE — 2060000000 HC ICU INTERMEDIATE R&B

## 2019-07-02 PROCEDURE — 97535 SELF CARE MNGMENT TRAINING: CPT

## 2019-07-02 PROCEDURE — 36415 COLL VENOUS BLD VENIPUNCTURE: CPT

## 2019-07-02 PROCEDURE — 2580000003 HC RX 258: Performed by: INTERNAL MEDICINE

## 2019-07-02 PROCEDURE — 97530 THERAPEUTIC ACTIVITIES: CPT

## 2019-07-02 PROCEDURE — 97110 THERAPEUTIC EXERCISES: CPT

## 2019-07-02 PROCEDURE — 80048 BASIC METABOLIC PNL TOTAL CA: CPT

## 2019-07-02 PROCEDURE — 94761 N-INVAS EAR/PLS OXIMETRY MLT: CPT

## 2019-07-02 PROCEDURE — 92526 ORAL FUNCTION THERAPY: CPT

## 2019-07-02 PROCEDURE — 97116 GAIT TRAINING THERAPY: CPT

## 2019-07-02 RX ORDER — SODIUM CHLORIDE 9 MG/ML
INJECTION, SOLUTION INTRAVENOUS
Status: DISPENSED
Start: 2019-07-02 | End: 2019-07-02

## 2019-07-02 RX ORDER — PREDNISONE 10 MG/1
40 TABLET ORAL DAILY
Status: DISCONTINUED | OUTPATIENT
Start: 2019-07-02 | End: 2019-07-03 | Stop reason: HOSPADM

## 2019-07-02 RX ADMIN — TRAZODONE HYDROCHLORIDE 50 MG: 50 TABLET ORAL at 21:38

## 2019-07-02 RX ADMIN — NADOLOL 40 MG: 40 TABLET ORAL at 21:38

## 2019-07-02 RX ADMIN — ALBUTEROL SULFATE 2.5 MG: 2.5 SOLUTION RESPIRATORY (INHALATION) at 06:48

## 2019-07-02 RX ADMIN — Medication 10 ML: at 08:57

## 2019-07-02 RX ADMIN — LEVOTHYROXINE SODIUM 150 MCG: 150 TABLET ORAL at 06:45

## 2019-07-02 RX ADMIN — METHYLPREDNISOLONE SODIUM SUCCINATE 40 MG: 40 INJECTION, POWDER, FOR SOLUTION INTRAMUSCULAR; INTRAVENOUS at 08:57

## 2019-07-02 RX ADMIN — PREDNISONE 40 MG: 10 TABLET ORAL at 19:05

## 2019-07-02 RX ADMIN — GUAIFENESIN 600 MG: 600 TABLET, EXTENDED RELEASE ORAL at 08:57

## 2019-07-02 RX ADMIN — MEROPENEM 1 G: 1 INJECTION, POWDER, FOR SOLUTION INTRAVENOUS at 00:44

## 2019-07-02 RX ADMIN — INSULIN LISPRO 1 UNITS: 100 INJECTION, SOLUTION INTRAVENOUS; SUBCUTANEOUS at 21:38

## 2019-07-02 RX ADMIN — ALBUTEROL SULFATE 2.5 MG: 2.5 SOLUTION RESPIRATORY (INHALATION) at 19:24

## 2019-07-02 RX ADMIN — ACETAMINOPHEN 650 MG: 325 TABLET ORAL at 19:10

## 2019-07-02 RX ADMIN — ALBUTEROL SULFATE 2.5 MG: 2.5 SOLUTION RESPIRATORY (INHALATION) at 13:21

## 2019-07-02 RX ADMIN — AMITRIPTYLINE HYDROCHLORIDE 25 MG: 25 TABLET, FILM COATED ORAL at 21:38

## 2019-07-02 RX ADMIN — ENOXAPARIN SODIUM 40 MG: 100 INJECTION SUBCUTANEOUS at 17:19

## 2019-07-02 RX ADMIN — ASPIRIN 81 MG 81 MG: 81 TABLET ORAL at 08:57

## 2019-07-02 RX ADMIN — GUAIFENESIN 600 MG: 600 TABLET, EXTENDED RELEASE ORAL at 21:38

## 2019-07-02 RX ADMIN — MUPIROCIN: 20 OINTMENT TOPICAL at 21:38

## 2019-07-02 RX ADMIN — SERTRALINE 200 MG: 100 TABLET, FILM COATED ORAL at 08:57

## 2019-07-02 RX ADMIN — FUROSEMIDE 40 MG: 10 INJECTION, SOLUTION INTRAMUSCULAR; INTRAVENOUS at 08:57

## 2019-07-02 RX ADMIN — INSULIN LISPRO 1 UNITS: 100 INJECTION, SOLUTION INTRAVENOUS; SUBCUTANEOUS at 08:59

## 2019-07-02 RX ADMIN — MEROPENEM 1 G: 1 INJECTION, POWDER, FOR SOLUTION INTRAVENOUS at 08:58

## 2019-07-02 RX ADMIN — MIRTAZAPINE 30 MG: 30 TABLET, FILM COATED ORAL at 21:38

## 2019-07-02 RX ADMIN — MEROPENEM 1 G: 1 INJECTION, POWDER, FOR SOLUTION INTRAVENOUS at 19:05

## 2019-07-02 ASSESSMENT — PAIN SCALES - GENERAL: PAINLEVEL_OUTOF10: 3

## 2019-07-02 NOTE — PROGRESS NOTES
hand-held assist  Comment: pt required VC for O2 line management, and assist for telemetry management     Stair Training deferred, pt unsafe/not appropriate to complete stairs at this time  Pt ascended/descended  stairs with N/A with N/A, and use of N/A. Pattern: N/A    Therapeutic Exercise all completed bilaterally unless indicated; encouraged pt to complete the following daily as a HEP, pt IND with each exercise  Seated heel/toe raises: x15  LAQ: x15  Seated march: x15  Clamshells: x15  Shoulder press: x15  Forward punches with PLBing: x15      Balance  Static Sitting:  Good    Tolerance: WNL  Dynamic Sitting:  Good   Static Standing: Good    Tolerance: duration of gait, ~1-2 min   Dynamic Standing: Good     Patient Education      Role of PT, POC, Discharge recommendations, safety awareness, transfer techniques, pursed lip breathing, energy conservation, pacing activity, HEP and calling for assist with mobility. Positioning Needs       Pt sitting up in chair, call light and needs in reach. Activity Tolerance   Pt completed therapy session with SOB noted w/activity. SpO2: 90-93% at rest on 4L   88% with transfer bed to chair on 4L   90-94% seated LE Dimitry, 4L   83% post ambulation on 4L, recovering to >90% with 2 min seated rest and PLBing   93% seated UE Dimitry, 4L  HR: ~100-115bpm  BP:     Other  None. Assessment :  Patient continues to be limited by O2 demands, today being weaned to 4L, but unable to maintain adequate SpO2 during ambulation. Instructed pt in HEP, and she demonstrated independence. Plan to trial rollator prior to DC. Continue to recommend home 24/7 supervision and home PT at discharge. Goals (all goals ongoing unless otherwise indicated)  To be met in 3 visits:  1). Independent with LE Ex x 10 reps     To be met in 6 visits:  1). Supine to/from sit: Independent  2). Sit to/from stand: Independent - Met 7/1  3). Bed to chair: Supervision - Met 7/1  4).   Gait: Ambulate 50 ft  with SBA  and use of No AD  5). Tolerate B LE exercises 3 sets of 10-15 reps  6). Ascend/descend 3 steps with CGA with use of One Hand rail and No AD. Plan   Continue with plan of care. Trial Rollator at next visit as appropriate. Dev Vicente, PT, DPT #[id number]    If patient discharges from this facility prior to next visit, this note will serve as the Discharge Summary.

## 2019-07-02 NOTE — PROGRESS NOTES
Pt resting in bed watching TV on computer. Pt c/o chest/head pain of 6/10 from coughing, PRN APAP given. Assessment complete-see flowsheet. Medications given-see MAR. Pt denies further needs at this time. Call light and bedside table within reach. Will continue to monitor.

## 2019-07-03 ENCOUNTER — TELEPHONE (OUTPATIENT)
Dept: PULMONOLOGY | Age: 64
End: 2019-07-03

## 2019-07-03 VITALS
TEMPERATURE: 97.6 F | SYSTOLIC BLOOD PRESSURE: 133 MMHG | RESPIRATION RATE: 21 BRPM | HEART RATE: 63 BPM | DIASTOLIC BLOOD PRESSURE: 83 MMHG | WEIGHT: 203.1 LBS | BODY MASS INDEX: 39.87 KG/M2 | HEIGHT: 60 IN | OXYGEN SATURATION: 96 %

## 2019-07-03 LAB
ANION GAP SERPL CALCULATED.3IONS-SCNC: 9 MMOL/L (ref 3–16)
BUN BLDV-MCNC: 15 MG/DL (ref 7–20)
CALCIUM SERPL-MCNC: 8.6 MG/DL (ref 8.3–10.6)
CHLORIDE BLD-SCNC: 97 MMOL/L (ref 99–110)
CO2: 29 MMOL/L (ref 21–32)
CREAT SERPL-MCNC: <0.5 MG/DL (ref 0.6–1.2)
GFR AFRICAN AMERICAN: >60
GFR NON-AFRICAN AMERICAN: >60
GLUCOSE BLD-MCNC: 143 MG/DL (ref 70–99)
GLUCOSE BLD-MCNC: 186 MG/DL (ref 70–99)
GLUCOSE BLD-MCNC: 207 MG/DL (ref 70–99)
PERFORMED ON: ABNORMAL
PERFORMED ON: ABNORMAL
POTASSIUM SERPL-SCNC: 3.6 MMOL/L (ref 3.5–5.1)
SODIUM BLD-SCNC: 135 MMOL/L (ref 136–145)

## 2019-07-03 PROCEDURE — 6360000002 HC RX W HCPCS: Performed by: INTERNAL MEDICINE

## 2019-07-03 PROCEDURE — 6370000000 HC RX 637 (ALT 250 FOR IP): Performed by: INTERNAL MEDICINE

## 2019-07-03 PROCEDURE — 94640 AIRWAY INHALATION TREATMENT: CPT

## 2019-07-03 PROCEDURE — 80048 BASIC METABOLIC PNL TOTAL CA: CPT

## 2019-07-03 PROCEDURE — 99232 SBSQ HOSP IP/OBS MODERATE 35: CPT | Performed by: INTERNAL MEDICINE

## 2019-07-03 PROCEDURE — 36415 COLL VENOUS BLD VENIPUNCTURE: CPT

## 2019-07-03 PROCEDURE — 2700000000 HC OXYGEN THERAPY PER DAY

## 2019-07-03 PROCEDURE — 99238 HOSP IP/OBS DSCHRG MGMT 30/<: CPT | Performed by: INTERNAL MEDICINE

## 2019-07-03 PROCEDURE — 97116 GAIT TRAINING THERAPY: CPT

## 2019-07-03 PROCEDURE — 2580000003 HC RX 258: Performed by: INTERNAL MEDICINE

## 2019-07-03 PROCEDURE — 94761 N-INVAS EAR/PLS OXIMETRY MLT: CPT

## 2019-07-03 RX ORDER — PREDNISONE 10 MG/1
TABLET ORAL
Qty: 30 TABLET | Refills: 0 | Status: SHIPPED | OUTPATIENT
Start: 2019-07-03 | End: 2019-07-10 | Stop reason: ALTCHOICE

## 2019-07-03 RX ORDER — PREDNISONE 20 MG/1
40 TABLET ORAL DAILY
Qty: 60 TABLET | Refills: 0 | Status: SHIPPED | OUTPATIENT
Start: 2019-07-03 | End: 2019-08-02

## 2019-07-03 RX ADMIN — Medication 10 ML: at 08:48

## 2019-07-03 RX ADMIN — ASPIRIN 81 MG 81 MG: 81 TABLET ORAL at 08:48

## 2019-07-03 RX ADMIN — LEVOTHYROXINE SODIUM 150 MCG: 150 TABLET ORAL at 05:45

## 2019-07-03 RX ADMIN — MEROPENEM 1 G: 1 INJECTION, POWDER, FOR SOLUTION INTRAVENOUS at 08:47

## 2019-07-03 RX ADMIN — ALBUTEROL SULFATE 2.5 MG: 2.5 SOLUTION RESPIRATORY (INHALATION) at 07:18

## 2019-07-03 RX ADMIN — ACETAMINOPHEN 650 MG: 325 TABLET ORAL at 03:49

## 2019-07-03 RX ADMIN — GUAIFENESIN 600 MG: 600 TABLET, EXTENDED RELEASE ORAL at 08:48

## 2019-07-03 RX ADMIN — FUROSEMIDE 40 MG: 10 INJECTION, SOLUTION INTRAMUSCULAR; INTRAVENOUS at 08:48

## 2019-07-03 RX ADMIN — SERTRALINE 200 MG: 100 TABLET, FILM COATED ORAL at 08:48

## 2019-07-03 RX ADMIN — MEROPENEM 1 G: 1 INJECTION, POWDER, FOR SOLUTION INTRAVENOUS at 00:02

## 2019-07-03 RX ADMIN — PREDNISONE 40 MG: 10 TABLET ORAL at 08:48

## 2019-07-03 RX ADMIN — INSULIN LISPRO 1 UNITS: 100 INJECTION, SOLUTION INTRAVENOUS; SUBCUTANEOUS at 08:48

## 2019-07-03 RX ADMIN — INSULIN LISPRO 1 UNITS: 100 INJECTION, SOLUTION INTRAVENOUS; SUBCUTANEOUS at 12:46

## 2019-07-03 ASSESSMENT — PAIN DESCRIPTION - PAIN TYPE: TYPE: ACUTE PAIN

## 2019-07-03 ASSESSMENT — PAIN SCALES - GENERAL
PAINLEVEL_OUTOF10: 0
PAINLEVEL_OUTOF10: 3

## 2019-07-03 ASSESSMENT — PAIN DESCRIPTION - FREQUENCY: FREQUENCY: INTERMITTENT

## 2019-07-03 ASSESSMENT — PAIN DESCRIPTION - DESCRIPTORS: DESCRIPTORS: DISCOMFORT

## 2019-07-03 ASSESSMENT — PAIN DESCRIPTION - LOCATION: LOCATION: GENERALIZED

## 2019-07-03 ASSESSMENT — PAIN DESCRIPTION - ORIENTATION: ORIENTATION: OTHER (COMMENT)

## 2019-07-03 ASSESSMENT — PAIN - FUNCTIONAL ASSESSMENT: PAIN_FUNCTIONAL_ASSESSMENT: ACTIVITIES ARE NOT PREVENTED

## 2019-07-03 ASSESSMENT — PAIN DESCRIPTION - PROGRESSION: CLINICAL_PROGRESSION: GRADUALLY WORSENING

## 2019-07-03 ASSESSMENT — PAIN DESCRIPTION - ONSET: ONSET: GRADUAL

## 2019-07-03 NOTE — PROGRESS NOTES
Discharge paperwork and instructions reviewed with patient. Verbalized understanding. New medications/medication changes reviewed. PIV and tele monitor removed. Patient provided with portable oxygen tank from [employer]. Transport requested.

## 2019-07-03 NOTE — PROGRESS NOTES
dextrose, albuterol sulfate HFA, LORazepam, sodium chloride flush, magnesium hydroxide, ondansetron, acetaminophen    Labs:  CBC:   No results for input(s): WBC, HGB, HCT, MCV, PLT in the last 72 hours. BMP:   Recent Labs     07/01/19  1315 07/02/19  0436 07/03/19  0446    138 135*   K 3.7 3.8 3.6   CL 97* 99 97*   CO2 28 26 29   BUN 11 12 15   CREATININE <0.5* <0.5* <0.5*     LIVER PROFILE: No results for input(s): AST, ALT, LIPASE, BILIDIR, BILITOT, ALKPHOS in the last 72 hours. Invalid input(s): AMYLASE,  ALB  PT/INR: No results for input(s): PROTIME, INR in the last 72 hours. APTT: No results for input(s): APTT in the last 72 hours. UA:No results for input(s): NITRITE, COLORU, PHUR, LABCAST, WBCUA, RBCUA, MUCUS, TRICHOMONAS, YEAST, BACTERIA, CLARITYU, SPECGRAV, LEUKOCYTESUR, UROBILINOGEN, BILIRUBINUR, BLOODU, GLUCOSEU, AMORPHOUS in the last 72 hours. Invalid input(s): KETONESU  No results for input(s): PHART, EIE5VDG, PO2ART in the last 72 hours. Cultures:   BAL 6/287NGTD     Films:  CXR  6/30 reviewed by me and showed   Multifocal airspace disease not significantly changed    Left upper lobe, transbronchial biopsies:  - Fragments of benign bronchial wall. - Negative for granulomas or other significant inflammation.  - Negative for malignancy. ASSESSMENT:  · Acute hypoxemic respiratory failure   · Abnormal CT chest- DDx atypical infection, aspiration pneumonitis, sarcoidosis, , HP, CT ILD, vasculitis, NSIP, less likely lymphoma and pulmonary edema. SHAZIA Bx 6/27 nondiagnostic  · Atrial fibrillation and grade 2 diastolic dysfunction  · Restrictive lung disease        PLAN:  · Supplemental oxygen to maintain SaO2 >92%; wean as tolerated  · Prednisone 40 daily until she sees Dr. Susan Acosta in 1 week (Office is  notified)   · Meropenem IV day #8/8.   Completed 3 days of vancomycin  · Follow-up cultures  · Diuretics per internal medicine

## 2019-07-05 ENCOUNTER — CARE COORDINATION (OUTPATIENT)
Dept: CASE MANAGEMENT | Age: 64
End: 2019-07-05

## 2019-07-05 ENCOUNTER — TELEPHONE (OUTPATIENT)
Dept: PHARMACY | Facility: CLINIC | Age: 64
End: 2019-07-05

## 2019-07-06 ENCOUNTER — CARE COORDINATION (OUTPATIENT)
Dept: CASE MANAGEMENT | Age: 64
End: 2019-07-06

## 2019-07-06 NOTE — CARE COORDINATION
Emil 45 Transitions Initial Follow Up Call    Call within 2 business days of discharge: Yes    Patient: Brian Thao Patient : 1955   MRN: [id number]  Reason for Admission: resp failure  PNA  Discharge Date: 7/3/19 RARS: Readmission Risk Score: 18      Last Discharge [facility]       Complaint Diagnosis Description Type Department Provider    19 Cough Acute bronchitis with bronchospasm . .. ED to Hosp-Admission (Discharged) (ADMITTED) [address] [facility] Arlene Ahumada, MD; Daniel Reyes. .. Facility: [facility]    2nd attempt to reach patient for post 24h discharge care transition call. Message left stating purpose of call, contact information and request for return call.        Follow Up  Future Appointments   Date Time Provider Calvin Mcmullen   7/10/2019 11:30 AM Neeraj Ruiz MD [facility]   2019  8:15 AM Uvaldo Scott MD [facility]       Parisa Johansen RN

## 2019-07-07 ENCOUNTER — CARE COORDINATION (OUTPATIENT)
Dept: CASE MANAGEMENT | Age: 64
End: 2019-07-07

## 2019-07-08 NOTE — TELEPHONE ENCOUNTER
Second attempt made to contact the patient in regards to post-discharge medication review. Left message for patient to return my call. Will prepare and send a letter to the patient today. Will sign off at this time.      Arielle Gordon, PharmD, [address] [facility]  Direct: [phone]  Department, toll free [phone], option [address] Call Made?: Yes  [facility] ([facility] Select Patient?: Yes  Total # of Interventions Recommended: 0  Total # Interventions Accepted: 0  Outreach Status: Patient Unreachable  Care Coordinator Outreach to Patient?: No  Provider Contacted?: No  Time Spent (min): 15

## 2019-07-10 ENCOUNTER — APPOINTMENT (OUTPATIENT)
Dept: GENERAL RADIOLOGY | Age: 64
DRG: 853 | End: 2019-07-10
Payer: COMMERCIAL

## 2019-07-10 ENCOUNTER — HOSPITAL ENCOUNTER (INPATIENT)
Age: 64
LOS: 4 days | Discharge: HOME | DRG: 853 | End: 2019-07-14
Attending: EMERGENCY MEDICINE | Admitting: INTERNAL MEDICINE
Payer: COMMERCIAL

## 2019-07-10 ENCOUNTER — TELEPHONE (OUTPATIENT)
Dept: OTHER | Facility: CLINIC | Age: 64
End: 2019-07-10

## 2019-07-10 DIAGNOSIS — R06.00 DYSPNEA AND RESPIRATORY ABNORMALITIES: ICD-10-CM

## 2019-07-10 DIAGNOSIS — R65.10 SIRS (SYSTEMIC INFLAMMATORY RESPONSE SYNDROME) (HCC): ICD-10-CM

## 2019-07-10 DIAGNOSIS — R06.89 DYSPNEA AND RESPIRATORY ABNORMALITIES: ICD-10-CM

## 2019-07-10 DIAGNOSIS — R09.02 HYPOXEMIA: ICD-10-CM

## 2019-07-10 DIAGNOSIS — J18.9 PNEUMONIA DUE TO ORGANISM: Primary | ICD-10-CM

## 2019-07-10 LAB
A/G RATIO: 0.8 (ref 1.1–2.2)
ALBUMIN SERPL-MCNC: 3 G/DL (ref 3.4–5)
ALP BLD-CCNC: 108 U/L (ref 40–129)
ALT SERPL-CCNC: 35 U/L (ref 10–40)
ANION GAP SERPL CALCULATED.3IONS-SCNC: 11 MMOL/L (ref 3–16)
APPEARANCE BAL (LAVAGE): ABNORMAL
AST SERPL-CCNC: 45 U/L (ref 15–37)
BACTERIA: ABNORMAL /HPF
BASOPHILS ABSOLUTE: 0.1 K/UL (ref 0–0.2)
BASOPHILS RELATIVE PERCENT: 0.7 %
BILIRUB SERPL-MCNC: 0.5 MG/DL (ref 0–1)
BILIRUBIN URINE: NEGATIVE
BLOOD, URINE: NEGATIVE
BUN BLDV-MCNC: 9 MG/DL (ref 7–20)
C-REACTIVE PROTEIN: 50.8 MG/L (ref 0–5.1)
CALCIUM SERPL-MCNC: 8.8 MG/DL (ref 8.3–10.6)
CHLORIDE BLD-SCNC: 104 MMOL/L (ref 99–110)
CLARITY: CLEAR
CLOT EVALUATION BAL: ABNORMAL
CO2: 26 MMOL/L (ref 21–32)
COLOR LAVAGE: COLORLESS
COLOR: YELLOW
CREAT SERPL-MCNC: <0.5 MG/DL (ref 0.6–1.2)
EOSIN: 2 %
EOSINOPHILS ABSOLUTE: 0.4 K/UL (ref 0–0.6)
EOSINOPHILS RELATIVE PERCENT: 2.6 %
EPITHELIAL CELLS, UA: ABNORMAL /HPF
GFR AFRICAN AMERICAN: >60
GFR NON-AFRICAN AMERICAN: >60
GLOBULIN: 3.8 G/DL
GLUCOSE BLD-MCNC: 161 MG/DL (ref 70–99)
GLUCOSE URINE: NEGATIVE MG/DL
HCT VFR BLD CALC: 35.7 % (ref 36–48)
HEMOGLOBIN: 11.5 G/DL (ref 12–16)
INR BLD: 1.17 (ref 0.86–1.14)
KETONES, URINE: NEGATIVE MG/DL
LACTIC ACID: 1.9 MMOL/L (ref 0.4–2)
LEUKOCYTE ESTERASE, URINE: ABNORMAL
LYMPHOCYTES ABSOLUTE: 3.2 K/UL (ref 1–5.1)
LYMPHOCYTES RELATIVE PERCENT: 19.4 %
LYMPHOCYTES, BAL: 22 % (ref 5–10)
MACROPHAGES, BAL: 18 % (ref 90–95)
MCH RBC QN AUTO: 27.2 PG (ref 26–34)
MCHC RBC AUTO-ENTMCNC: 32.2 G/DL (ref 31–36)
MCV RBC AUTO: 84.5 FL (ref 80–100)
MICROSCOPIC EXAMINATION: YES
MONOCYTES ABSOLUTE: 1.1 K/UL (ref 0–1.3)
MONOCYTES RELATIVE PERCENT: 6.8 %
NEUTROPHILS ABSOLUTE: 11.7 K/UL (ref 1.7–7.7)
NEUTROPHILS RELATIVE PERCENT: 70.5 %
NITRITE, URINE: NEGATIVE
NUMBER OF CELLS COUNTED BAL (LAVAGE): 200
PDW BLD-RTO: 16.6 % (ref 12.4–15.4)
PH UA: 6.5 (ref 5–8)
PLATELET # BLD: 204 K/UL (ref 135–450)
PMV BLD AUTO: 7.5 FL (ref 5–10.5)
POTASSIUM SERPL-SCNC: 4 MMOL/L (ref 3.5–5.1)
PRO-BNP: 276 PG/ML (ref 0–124)
PROTEIN UA: NEGATIVE MG/DL
PROTHROMBIN TIME: 13.3 SEC (ref 9.8–13)
RBC # BLD: 4.22 M/UL (ref 4–5.2)
RBC UA: ABNORMAL /HPF (ref 0–2)
RBC, BAL: 3200 /CUMM
SEGMENTED NEUTROPHILS, BAL: 58 % (ref 5–10)
SODIUM BLD-SCNC: 141 MMOL/L (ref 136–145)
SPECIFIC GRAVITY UA: 1.01 (ref 1–1.03)
TOTAL CK: 23 U/L (ref 26–192)
TOTAL PROTEIN: 6.8 G/DL (ref 6.4–8.2)
TROPONIN: <0.01 NG/ML
URINE TYPE: ABNORMAL
UROBILINOGEN, URINE: 1 E.U./DL
WBC # BLD: 16.6 K/UL (ref 4–11)
WBC UA: ABNORMAL /HPF (ref 0–5)
WBC/EPI CELLS BAL: 576 /CUMM

## 2019-07-10 PROCEDURE — 87116 MYCOBACTERIA CULTURE: CPT

## 2019-07-10 PROCEDURE — 86225 DNA ANTIBODY NATIVE: CPT

## 2019-07-10 PROCEDURE — 99291 CRITICAL CARE FIRST HOUR: CPT

## 2019-07-10 PROCEDURE — 82550 ASSAY OF CK (CPK): CPT

## 2019-07-10 PROCEDURE — 88305 TISSUE EXAM BY PATHOLOGIST: CPT

## 2019-07-10 PROCEDURE — 87077 CULTURE AEROBIC IDENTIFY: CPT

## 2019-07-10 PROCEDURE — 86255 FLUORESCENT ANTIBODY SCREEN: CPT

## 2019-07-10 PROCEDURE — 94150 VITAL CAPACITY TEST: CPT

## 2019-07-10 PROCEDURE — 99223 1ST HOSP IP/OBS HIGH 75: CPT | Performed by: PHYSICIAN ASSISTANT

## 2019-07-10 PROCEDURE — 84484 ASSAY OF TROPONIN QUANT: CPT

## 2019-07-10 PROCEDURE — 87390 HIV-1 AG IA: CPT

## 2019-07-10 PROCEDURE — 83516 IMMUNOASSAY NONANTIBODY: CPT

## 2019-07-10 PROCEDURE — 87206 SMEAR FLUORESCENT/ACID STAI: CPT

## 2019-07-10 PROCEDURE — 31624 DX BRONCHOSCOPE/LAVAGE: CPT | Performed by: INTERNAL MEDICINE

## 2019-07-10 PROCEDURE — 6360000002 HC RX W HCPCS: Performed by: INTERNAL MEDICINE

## 2019-07-10 PROCEDURE — 86431 RHEUMATOID FACTOR QUANT: CPT

## 2019-07-10 PROCEDURE — 6370000000 HC RX 637 (ALT 250 FOR IP): Performed by: EMERGENCY MEDICINE

## 2019-07-10 PROCEDURE — 2580000003 HC RX 258: Performed by: EMERGENCY MEDICINE

## 2019-07-10 PROCEDURE — 2000000000 HC ICU R&B

## 2019-07-10 PROCEDURE — 85610 PROTHROMBIN TIME: CPT

## 2019-07-10 PROCEDURE — 87040 BLOOD CULTURE FOR BACTERIA: CPT

## 2019-07-10 PROCEDURE — 83605 ASSAY OF LACTIC ACID: CPT

## 2019-07-10 PROCEDURE — 80053 COMPREHEN METABOLIC PANEL: CPT

## 2019-07-10 PROCEDURE — 82595 ASSAY OF CRYOGLOBULIN: CPT

## 2019-07-10 PROCEDURE — 2709999900 HC NON-CHARGEABLE SUPPLY: Performed by: INTERNAL MEDICINE

## 2019-07-10 PROCEDURE — 51702 INSERT TEMP BLADDER CATH: CPT

## 2019-07-10 PROCEDURE — 99152 MOD SED SAME PHYS/QHP 5/>YRS: CPT | Performed by: INTERNAL MEDICINE

## 2019-07-10 PROCEDURE — 94669 MECHANICAL CHEST WALL OSCILL: CPT

## 2019-07-10 PROCEDURE — 87205 SMEAR GRAM STAIN: CPT

## 2019-07-10 PROCEDURE — 99223 1ST HOSP IP/OBS HIGH 75: CPT | Performed by: INTERNAL MEDICINE

## 2019-07-10 PROCEDURE — 87070 CULTURE OTHR SPECIMN AEROBIC: CPT

## 2019-07-10 PROCEDURE — 6370000000 HC RX 637 (ALT 250 FOR IP): Performed by: INTERNAL MEDICINE

## 2019-07-10 PROCEDURE — 71045 X-RAY EXAM CHEST 1 VIEW: CPT

## 2019-07-10 PROCEDURE — 6360000002 HC RX W HCPCS: Performed by: PHYSICIAN ASSISTANT

## 2019-07-10 PROCEDURE — 86606 ASPERGILLUS ANTIBODY: CPT

## 2019-07-10 PROCEDURE — 6370000000 HC RX 637 (ALT 250 FOR IP): Performed by: PHYSICIAN ASSISTANT

## 2019-07-10 PROCEDURE — 0B9H8ZX DRAINAGE OF LUNG LINGULA, VIA NATURAL OR ARTIFICIAL OPENING ENDOSCOPIC, DIAGNOSTIC: ICD-10-PCS | Performed by: INTERNAL MEDICINE

## 2019-07-10 PROCEDURE — 94640 AIRWAY INHALATION TREATMENT: CPT

## 2019-07-10 PROCEDURE — 85025 COMPLETE CBC W/AUTO DIFF WBC: CPT

## 2019-07-10 PROCEDURE — 81001 URINALYSIS AUTO W/SCOPE: CPT

## 2019-07-10 PROCEDURE — 86003 ALLG SPEC IGE CRUDE XTRC EA: CPT

## 2019-07-10 PROCEDURE — 2580000003 HC RX 258: Performed by: PHYSICIAN ASSISTANT

## 2019-07-10 PROCEDURE — 2700000000 HC OXYGEN THERAPY PER DAY

## 2019-07-10 PROCEDURE — 87186 SC STD MICRODIL/AGAR DIL: CPT

## 2019-07-10 PROCEDURE — 36415 COLL VENOUS BLD VENIPUNCTURE: CPT

## 2019-07-10 PROCEDURE — 88312 SPECIAL STAINS GROUP 1: CPT

## 2019-07-10 PROCEDURE — 86701 HIV-1ANTIBODY: CPT

## 2019-07-10 PROCEDURE — 87102 FUNGUS ISOLATION CULTURE: CPT

## 2019-07-10 PROCEDURE — 3609010800 HC BRONCHOSCOPY ALVEOLAR LAVAGE: Performed by: INTERNAL MEDICINE

## 2019-07-10 PROCEDURE — 87106 FUNGI IDENTIFICATION YEAST: CPT

## 2019-07-10 PROCEDURE — 86140 C-REACTIVE PROTEIN: CPT

## 2019-07-10 PROCEDURE — 89051 BODY FLUID CELL COUNT: CPT

## 2019-07-10 PROCEDURE — 96374 THER/PROPH/DIAG INJ IV PUSH: CPT

## 2019-07-10 PROCEDURE — 0BD98ZX EXTRACTION OF LINGULA BRONCHUS, VIA NATURAL OR ARTIFICIAL OPENING ENDOSCOPIC, DIAGNOSTIC: ICD-10-PCS | Performed by: INTERNAL MEDICINE

## 2019-07-10 PROCEDURE — 86331 IMMUNODIFFUSION OUCHTERLONY: CPT

## 2019-07-10 PROCEDURE — 6370000000 HC RX 637 (ALT 250 FOR IP)

## 2019-07-10 PROCEDURE — 87015 SPECIMEN INFECT AGNT CONCNTJ: CPT

## 2019-07-10 PROCEDURE — 86702 HIV-2 ANTIBODY: CPT

## 2019-07-10 PROCEDURE — 83880 ASSAY OF NATRIURETIC PEPTIDE: CPT

## 2019-07-10 PROCEDURE — 87252 VIRUS INOCULATION TISSUE: CPT

## 2019-07-10 PROCEDURE — 6360000002 HC RX W HCPCS: Performed by: EMERGENCY MEDICINE

## 2019-07-10 PROCEDURE — 87086 URINE CULTURE/COLONY COUNT: CPT

## 2019-07-10 PROCEDURE — 86005 ALLG SPEC IGE MULTIALLG SCR: CPT

## 2019-07-10 PROCEDURE — 93005 ELECTROCARDIOGRAM TRACING: CPT | Performed by: EMERGENCY MEDICINE

## 2019-07-10 PROCEDURE — 87081 CULTURE SCREEN ONLY: CPT

## 2019-07-10 PROCEDURE — 94761 N-INVAS EAR/PLS OXIMETRY MLT: CPT

## 2019-07-10 PROCEDURE — 88112 CYTOPATH CELL ENHANCE TECH: CPT

## 2019-07-10 RX ORDER — ONDANSETRON 2 MG/ML
4 INJECTION INTRAMUSCULAR; INTRAVENOUS EVERY 6 HOURS PRN
Status: DISCONTINUED | OUTPATIENT
Start: 2019-07-10 | End: 2019-07-14 | Stop reason: HOSPADM

## 2019-07-10 RX ORDER — LEVOTHYROXINE SODIUM 0.15 MG/1
150 TABLET ORAL DAILY
Status: DISCONTINUED | OUTPATIENT
Start: 2019-07-10 | End: 2019-07-14 | Stop reason: HOSPADM

## 2019-07-10 RX ORDER — MIRTAZAPINE 15 MG/1
30 TABLET, FILM COATED ORAL NIGHTLY
Status: DISCONTINUED | OUTPATIENT
Start: 2019-07-10 | End: 2019-07-14 | Stop reason: HOSPADM

## 2019-07-10 RX ORDER — IPRATROPIUM BROMIDE AND ALBUTEROL SULFATE 2.5; .5 MG/3ML; MG/3ML
1 SOLUTION RESPIRATORY (INHALATION) ONCE
Status: COMPLETED | OUTPATIENT
Start: 2019-07-10 | End: 2019-07-10

## 2019-07-10 RX ORDER — ASPIRIN 81 MG/1
81 TABLET, CHEWABLE ORAL DAILY
Status: DISCONTINUED | OUTPATIENT
Start: 2019-07-10 | End: 2019-07-14 | Stop reason: HOSPADM

## 2019-07-10 RX ORDER — POTASSIUM CHLORIDE 750 MG/1
10 TABLET, EXTENDED RELEASE ORAL DAILY
Status: DISCONTINUED | OUTPATIENT
Start: 2019-07-10 | End: 2019-07-14

## 2019-07-10 RX ORDER — FENTANYL CITRATE 50 UG/ML
INJECTION, SOLUTION INTRAMUSCULAR; INTRAVENOUS PRN
Status: DISCONTINUED | OUTPATIENT
Start: 2019-07-10 | End: 2019-07-10 | Stop reason: ALTCHOICE

## 2019-07-10 RX ORDER — IPRATROPIUM BROMIDE AND ALBUTEROL SULFATE 2.5; .5 MG/3ML; MG/3ML
1 SOLUTION RESPIRATORY (INHALATION) EVERY 4 HOURS
Status: DISCONTINUED | OUTPATIENT
Start: 2019-07-10 | End: 2019-07-11

## 2019-07-10 RX ORDER — MIDAZOLAM HYDROCHLORIDE 5 MG/ML
INJECTION INTRAMUSCULAR; INTRAVENOUS PRN
Status: DISCONTINUED | OUTPATIENT
Start: 2019-07-10 | End: 2019-07-10 | Stop reason: ALTCHOICE

## 2019-07-10 RX ORDER — TRAZODONE HYDROCHLORIDE 50 MG/1
50 TABLET ORAL NIGHTLY
Status: DISCONTINUED | OUTPATIENT
Start: 2019-07-10 | End: 2019-07-14 | Stop reason: HOSPADM

## 2019-07-10 RX ORDER — METHYLPREDNISOLONE SODIUM SUCCINATE 125 MG/2ML
125 INJECTION, POWDER, LYOPHILIZED, FOR SOLUTION INTRAMUSCULAR; INTRAVENOUS ONCE
Status: COMPLETED | OUTPATIENT
Start: 2019-07-10 | End: 2019-07-10

## 2019-07-10 RX ORDER — ACETAMINOPHEN 500 MG
1000 TABLET ORAL ONCE
Status: COMPLETED | OUTPATIENT
Start: 2019-07-10 | End: 2019-07-10

## 2019-07-10 RX ORDER — LORAZEPAM 0.5 MG/1
0.5 TABLET ORAL EVERY 6 HOURS PRN
Status: DISCONTINUED | OUTPATIENT
Start: 2019-07-10 | End: 2019-07-14 | Stop reason: HOSPADM

## 2019-07-10 RX ORDER — SODIUM CHLORIDE 9 MG/ML
INJECTION, SOLUTION INTRAVENOUS CONTINUOUS
Status: DISCONTINUED | OUTPATIENT
Start: 2019-07-10 | End: 2019-07-10

## 2019-07-10 RX ORDER — IPRATROPIUM BROMIDE AND ALBUTEROL SULFATE 2.5; .5 MG/3ML; MG/3ML
SOLUTION RESPIRATORY (INHALATION)
Status: COMPLETED
Start: 2019-07-10 | End: 2019-07-10

## 2019-07-10 RX ORDER — MAGNESIUM SULFATE 1 G/100ML
1 INJECTION INTRAVENOUS PRN
Status: DISCONTINUED | OUTPATIENT
Start: 2019-07-10 | End: 2019-07-14 | Stop reason: HOSPADM

## 2019-07-10 RX ORDER — SERTRALINE HYDROCHLORIDE 100 MG/1
200 TABLET, FILM COATED ORAL DAILY
Status: DISCONTINUED | OUTPATIENT
Start: 2019-07-10 | End: 2019-07-14 | Stop reason: HOSPADM

## 2019-07-10 RX ORDER — SODIUM CHLORIDE 0.9 % (FLUSH) 0.9 %
10 SYRINGE (ML) INJECTION PRN
Status: DISCONTINUED | OUTPATIENT
Start: 2019-07-10 | End: 2019-07-14 | Stop reason: HOSPADM

## 2019-07-10 RX ORDER — NADOLOL 40 MG/1
40 TABLET ORAL NIGHTLY
Status: DISCONTINUED | OUTPATIENT
Start: 2019-07-10 | End: 2019-07-14 | Stop reason: HOSPADM

## 2019-07-10 RX ORDER — ALBUTEROL SULFATE 2.5 MG/3ML
2.5 SOLUTION RESPIRATORY (INHALATION) EVERY 4 HOURS PRN
Status: DISCONTINUED | OUTPATIENT
Start: 2019-07-10 | End: 2019-07-14 | Stop reason: HOSPADM

## 2019-07-10 RX ORDER — POTASSIUM CHLORIDE 7.45 MG/ML
10 INJECTION INTRAVENOUS PRN
Status: DISCONTINUED | OUTPATIENT
Start: 2019-07-10 | End: 2019-07-14 | Stop reason: HOSPADM

## 2019-07-10 RX ORDER — FUROSEMIDE 20 MG/1
20 TABLET ORAL DAILY
Status: DISCONTINUED | OUTPATIENT
Start: 2019-07-10 | End: 2019-07-11

## 2019-07-10 RX ORDER — SODIUM CHLORIDE 0.9 % (FLUSH) 0.9 %
10 SYRINGE (ML) INJECTION EVERY 12 HOURS SCHEDULED
Status: DISCONTINUED | OUTPATIENT
Start: 2019-07-10 | End: 2019-07-14 | Stop reason: HOSPADM

## 2019-07-10 RX ORDER — HYDROXYZINE HYDROCHLORIDE 10 MG/1
10 TABLET, FILM COATED ORAL 3 TIMES DAILY PRN
Status: DISCONTINUED | OUTPATIENT
Start: 2019-07-10 | End: 2019-07-14 | Stop reason: HOSPADM

## 2019-07-10 RX ORDER — ACETAMINOPHEN 325 MG/1
650 TABLET ORAL EVERY 4 HOURS PRN
Status: DISCONTINUED | OUTPATIENT
Start: 2019-07-10 | End: 2019-07-14 | Stop reason: HOSPADM

## 2019-07-10 RX ORDER — METHYLPREDNISOLONE SODIUM SUCCINATE 40 MG/ML
40 INJECTION, POWDER, LYOPHILIZED, FOR SOLUTION INTRAMUSCULAR; INTRAVENOUS EVERY 12 HOURS
Status: DISCONTINUED | OUTPATIENT
Start: 2019-07-10 | End: 2019-07-13

## 2019-07-10 RX ORDER — IPRATROPIUM BROMIDE AND ALBUTEROL SULFATE 2.5; .5 MG/3ML; MG/3ML
1 SOLUTION RESPIRATORY (INHALATION) 4 TIMES DAILY
Status: DISCONTINUED | OUTPATIENT
Start: 2019-07-10 | End: 2019-07-10

## 2019-07-10 RX ORDER — POTASSIUM CHLORIDE 750 MG/1
TABLET, EXTENDED RELEASE ORAL
Qty: 30 TABLET | Refills: 0 | Status: ON HOLD | OUTPATIENT
Start: 2019-07-10 | End: 2019-07-14 | Stop reason: HOSPADM

## 2019-07-10 RX ORDER — IBUPROFEN 600 MG/1
600 TABLET ORAL ONCE
Status: COMPLETED | OUTPATIENT
Start: 2019-07-10 | End: 2019-07-10

## 2019-07-10 RX ORDER — AMITRIPTYLINE HYDROCHLORIDE 25 MG/1
25 TABLET, FILM COATED ORAL NIGHTLY
Status: DISCONTINUED | OUTPATIENT
Start: 2019-07-10 | End: 2019-07-14 | Stop reason: HOSPADM

## 2019-07-10 RX ADMIN — METHYLPREDNISOLONE SODIUM SUCCINATE 125 MG: 125 INJECTION, POWDER, FOR SOLUTION INTRAMUSCULAR; INTRAVENOUS at 09:28

## 2019-07-10 RX ADMIN — AMITRIPTYLINE HYDROCHLORIDE 25 MG: 25 TABLET, FILM COATED ORAL at 21:52

## 2019-07-10 RX ADMIN — IPRATROPIUM BROMIDE AND ALBUTEROL SULFATE 1 AMPULE: .5; 3 SOLUTION RESPIRATORY (INHALATION) at 18:51

## 2019-07-10 RX ADMIN — IBUPROFEN 600 MG: 600 TABLET, FILM COATED ORAL at 11:56

## 2019-07-10 RX ADMIN — Medication 10 ML: at 14:04

## 2019-07-10 RX ADMIN — SODIUM CHLORIDE: 900 INJECTION, SOLUTION INTRAVENOUS at 14:03

## 2019-07-10 RX ADMIN — TRAZODONE HYDROCHLORIDE 50 MG: 50 TABLET ORAL at 21:53

## 2019-07-10 RX ADMIN — IPRATROPIUM BROMIDE AND ALBUTEROL SULFATE 1 AMPULE: .5; 3 SOLUTION RESPIRATORY (INHALATION) at 14:52

## 2019-07-10 RX ADMIN — IPRATROPIUM BROMIDE AND ALBUTEROL SULFATE 1 AMPULE: .5; 3 SOLUTION RESPIRATORY (INHALATION) at 09:35

## 2019-07-10 RX ADMIN — MEROPENEM 1 G: 1 INJECTION, POWDER, FOR SOLUTION INTRAVENOUS at 11:57

## 2019-07-10 RX ADMIN — MIRTAZAPINE 30 MG: 15 TABLET, FILM COATED ORAL at 21:54

## 2019-07-10 RX ADMIN — ENOXAPARIN SODIUM 40 MG: 40 INJECTION SUBCUTANEOUS at 14:04

## 2019-07-10 RX ADMIN — METHYLPREDNISOLONE SODIUM SUCCINATE 40 MG: 40 INJECTION, POWDER, FOR SOLUTION INTRAMUSCULAR; INTRAVENOUS at 16:38

## 2019-07-10 RX ADMIN — IPRATROPIUM BROMIDE AND ALBUTEROL SULFATE 1 AMPULE: .5; 3 SOLUTION RESPIRATORY (INHALATION) at 23:04

## 2019-07-10 RX ADMIN — VANCOMYCIN HYDROCHLORIDE 1000 MG: 1 INJECTION, POWDER, LYOPHILIZED, FOR SOLUTION INTRAVENOUS at 11:59

## 2019-07-10 RX ADMIN — VANCOMYCIN HYDROCHLORIDE 1250 MG: 1 INJECTION, POWDER, LYOPHILIZED, FOR SOLUTION INTRAVENOUS at 23:20

## 2019-07-10 RX ADMIN — ACETAMINOPHEN 1000 MG: 500 TABLET ORAL at 11:56

## 2019-07-10 RX ADMIN — IPRATROPIUM BROMIDE AND ALBUTEROL SULFATE 1 AMPULE: .5; 3 SOLUTION RESPIRATORY (INHALATION) at 09:09

## 2019-07-10 RX ADMIN — NADOLOL 40 MG: 40 TABLET ORAL at 21:53

## 2019-07-10 RX ADMIN — MEROPENEM 1 G: 1 INJECTION, POWDER, FOR SOLUTION INTRAVENOUS at 20:16

## 2019-07-10 RX ADMIN — IPRATROPIUM BROMIDE AND ALBUTEROL SULFATE 1 AMPULE: 2.5; .5 SOLUTION RESPIRATORY (INHALATION) at 09:09

## 2019-07-10 RX ADMIN — Medication 10 ML: at 21:53

## 2019-07-10 ASSESSMENT — ENCOUNTER SYMPTOMS
ABDOMINAL DISTENTION: 0
BACK PAIN: 0
COUGH: 1
SHORTNESS OF BREATH: 1
RECTAL PAIN: 0
CONSTIPATION: 0
APNEA: 0
SORE THROAT: 0
SINUS PAIN: 0
DIARRHEA: 0
SINUS PRESSURE: 0
CHEST TIGHTNESS: 0
NAUSEA: 0
ABDOMINAL PAIN: 0

## 2019-07-10 ASSESSMENT — PAIN SCALES - GENERAL
PAINLEVEL_OUTOF10: 0

## 2019-07-10 NOTE — TELEPHONE ENCOUNTER
Writer contacted Dr. Izaiah Walters,  ED provider to inform of 30 day readmission risk. ED provider informed writer of readmission.

## 2019-07-10 NOTE — H&P
BRONCHOSCOPY  6/27/2019    BRONCHOSCOPY/TRANSBRONCHIAL LUNG BIOPSY performed by Analisa Narayan MD at [address] [place] [address]  6/27/2019    BRONCHOSCOPY ALVEOLAR LAVAGE performed by Analisa Narayan MD at [address] [place] [address]  07/10/2019    CHOLECYSTECTOMY      HYSTERECTOMY, TOTAL ABDOMINAL         Medications Prior to Admission:    Prior to Admission medications    Medication Sig Start Date End Date Taking? Authorizing Provider   predniSONE (DELTASONE) 20 MG tablet Take 2 tablets by mouth daily 7/3/19 8/2/19 Yes Tono Barrios MD   LORazepam (ATIVAN) 0.5 MG tablet Take 0.5 mg by mouth every 6 hours as needed for Anxiety.    Yes Historical Provider, MD   Azelastine-Fluticasone (DYMISTA) 137-50 MCG/ACT SUSP by Nasal route   Yes Historical Provider, MD   Esomeprazole Magnesium (NEXIUM 24HR PO) Take by mouth   Yes Historical Provider, MD   hydrOXYzine (ATARAX) 10 MG tablet Take 10 mg by mouth 3 times daily as needed for Itching   Yes Historical Provider, MD   OXYGEN Inhale 2 L into the lungs   Yes Historical Provider, MD   mirtazapine (REMERON) 30 MG tablet TAKE 1 TABLET BY MOUTH EVERY DAY AT NIGHT 6/14/19  Yes Becky Monzon MD   ipratropium-albuterol (DUONEB) 0.5-2.5 (3) MG/3ML SOLN nebulizer solution Inhale 3 mLs into the lungs every 6 hours as needed for Shortness of Breath 6/8/19  Yes Lawrence Jaeger MD   furosemide (LASIX) 20 MG tablet Take 1 tablet by mouth daily 6/7/19  Yes Rody Painter MD   albuterol sulfate  (90 Base) MCG/ACT inhaler Inhale 2 puffs into the lungs 4 times daily as needed for Wheezing 5/30/19  Yes Nan Syed MD   levothyroxine (SYNTHROID) 150 MCG tablet TAKE 1 TABLET BY MOUTH EVERY DAY 5/2/19  Yes Becky Monzon MD   nadolol (CORGARD) 20 MG tablet TAKE 2 TABLETS BY MOUTH EVERY DAY  Patient taking differently: nightly TAKE 2 TABLETS BY MOUTH EVERY DAY 5/2/19  Yes Becky Monzon MD   amitriptyline (ELAVIL) 25 MG tablet Take 1 tablet by Alert.   Eyes: PERRL. No sclera icterus. No conjunctival injection. ENT: No discharge. Pharynx clear. Neck: No JVD. No Carotid Bruit. Trachea midline. Resp: No accessory muscle use. + crackles. + wheezes. No rhonchi. On 4L NC O2  CV: Regular rate. Regular rhythm. No murmur. No rub. + edema B ankles    Capillary Refill: Brisk,< 3 seconds   Peripheral Pulses: +2 palpable, equal bilaterally   GI: Non-tender. Non-distended. No masses. No organomegaly. Normal bowel sounds. No hernia. Skin: Warm and dry. No nodule on exposed extremities. No rash on exposed extremities. M/S: No cyanosis. No joint deformity. No clubbing. Neuro: Awake. Grossly nonfocal    Psych: Oriented x 3. No anxiety or agitation. CBC:   Recent Labs     07/10/19  0915   WBC 16.6*   HGB 11.5*   HCT 35.7*   MCV 84.5        BMP:   Recent Labs     07/10/19  1000      K 4.0      CO2 26   BUN 9   CREATININE <0.5*     LIVER PROFILE:   Recent Labs     07/10/19  1000   AST 45*   ALT 35   BILITOT 0.5   ALKPHOS 108     PT/INR:   Recent Labs     07/10/19  1000   PROTIME 13.3*   INR 1.17*     UA:  Recent Labs     07/10/19  0930   COLORU Yellow   PHUR 6.5   WBCUA 20-50*   RBCUA 0-2   BACTERIA 1+*   CLARITYU Clear   SPECGRAV 1.010   LEUKOCYTESUR LARGE*   UROBILINOGEN 1.0   BILIRUBINUR Negative   BLOODU Negative   GLUCOSEU Negative          CARDIAC ENZYMES  Recent Labs     07/10/19  1000   TROPONINI <0.01     Pro-BNP 276High       Lactic Acid 1.9        CULTURES  Blood Cx: pending   Legionella Cx: pending   Resp Cx: pending   Acid Fast: pending   Fungus Cx: pending     EKG:  I have reviewed the EKG with the following interpretation:   NSR, normal axis, normal interval, no acute ST segment changes concerning for acute ischemia     RADIOLOGY  XR CHEST PORTABLE   Final Result   Increased severity of diffuse bilateral airspace disease over the past couple   weeks.              Pertinent previous results reviewed   Bronchoscopy 6/28/2019 left upper lobe, transbronchial biopsies:  - Fragments of benign bronchial wall. - Negative for granulomas or other significant inflammation.  - Negative for malignancy. A. Right Paratracheal Lymph Node, Transbronchial Fine Needle Aspirate:       -  No malignant cells identified. B. Subcarinal Lymph Node, Transbronchial Fine Needle Aspirate:       -  No malignant cells identified. C. Lung, Left Upper Lobe, Bronchoalveolar Lavage:       -  No malignant cells identified. D. Lung, Bronchial Washings:       -  No malignant cells identified. ASSESSMENT/PLAN:  Sepsis (febrile, leukocytosis, tachynpnea,  Hypoxia, source)   - 2/2 PNA   - Cx pending   - Continue IV Abx   - IVF    Recurrent Pneumonia   - Patient has had multiple admissions for PNA despite multiple rounds of Abx   - She has completed Merrem and Vanc during her last admission   - Returns with worsening PNA on CXR, concerns for gram negative organism   - Merrem/Vanc D#1   - ICU   - Pulm consult: s/p bronch 7/10, initiated workup for vasculitis, may require open lung bx per pulm  - IBD, IV Steroids   - Follow up on Cx     Acute on Chronic hypoxic respiratory failure   - Requires 2L NC O2 at home but was hypoxic in to the 70's on home O2  - Required NRB in ED   - Now on 4L NC O2   - 2/2 recurrent PNA   - Continue supplemental O2 and wean as tolerated   - Admitted to ICU with intensivist/pulm consult     Possible UTI   - No significant sx   - UA +LE and WBC   - Urine Cx added   - Already on IV Abx, monitor for Cx     Chronic dCHF   - Continue home Lasix   - Appears well compensated     Atrial fibrillation   - Dx during a prior admission, converted back to NSR quickly and was not started on [facility]  - Rate is now controlled   - Continue home BB    Hypothyroidism   - Continue home synthroid     Obesity  - Body mass index is 35.48 kg/m². - Complicating assessment and treatment.  Placing patient at risk for multiple co-morbidities as well as early death and

## 2019-07-10 NOTE — PROGRESS NOTES
frequency. 5. Bronchodilator(s) will be discontinued if patient has a RSI less than 9 and has received no scheduled or as needed treatment for 72  Hrs. Patients Ordered on a Mucolytic Agent:    1. Must always be administered with a bronchodilator. 2. Discontinue if patient experiences worsened bronchospasm, or secretions have lessened to the point that the patient is able to clear them with a cough. Anti-inflammatory and Combination Medications:    1. If the patient lacks prior history of lung disease, is not using inhaled anti-inflammatory medication at home, and lacks wheezing by examination or by history for at least 24 hours, contact physician for possible discontinuation.

## 2019-07-10 NOTE — CONSULTS
Peripheral pulses are 2+. Capillary refill is less than 3 seconds. GI: Non-tender. Non-distended. No hernia. Skin: Warm and dry. No nodule on exposed extremities. Lymph: No cervical LAD. No supraclavicular LAD. M/S: No cyanosis. No joint deformity. No clubbing. Neuro: Awake. Alert. Moves all four extremities. Psych: Oriented x 3. No anxiety. LABS:  CBC:   Recent Labs     07/10/19  0915   WBC 16.6*   HGB 11.5*   HCT 35.7*   MCV 84.5        BMP:   Recent Labs     07/10/19  1000      K 4.0      CO2 26   BUN 9   CREATININE <0.5*     LIVER PROFILE:   Recent Labs     07/10/19  1000   AST 45*   ALT 35   BILITOT 0.5   ALKPHOS 108     PT/INR:   Recent Labs     07/10/19  1000   PROTIME 13.3*   INR 1.17*     APTT: No results for input(s): APTT in the last 72 hours. UA:  Recent Labs     07/10/19  0930   COLORU Yellow   PHUR 6.5   WBCUA 20-50*   RBCUA 0-2   BACTERIA 1+*   CLARITYU Clear   SPECGRAV 1.010   LEUKOCYTESUR LARGE*   UROBILINOGEN 1.0   BILIRUBINUR Negative   BLOODU Negative   GLUCOSEU Negative     No results for input(s): PHART, HUE6LFE, PO2ART in the last 72 hours. Chest x-ray 7/10/2019 imaging was reviewed by me and showed worsening bilateral pulmonary infiltrates    Bronchoscopy 6/28/2019   left upper lobe, transbronchial biopsies:  - Fragments of benign bronchial wall. - Negative for granulomas or other significant inflammation.  - Negative for malignancy. A. Right Paratracheal Lymph Node, Transbronchial Fine Needle Aspirate:       -  No malignant cells identified. B. Subcarinal Lymph Node, Transbronchial Fine Needle Aspirate:       -  No malignant cells identified. C. Lung, Left Upper Lobe, Bronchoalveolar Lavage:       -  No malignant cells identified. D. Lung, Bronchial Washings:       -  No malignant cells identified.     Subcarinal TB NA no phenotypically abnormal cells  AFB negative  Eosinophils 6%    CRP 56.9  AXEL negative  IgG 1310        ASSESSMENT:  · Acute

## 2019-07-10 NOTE — ED PROVIDER NOTES
>60    GFR African American >60 >60    Calcium 8.8 8.3 - 10.6 mg/dL    Total Protein 6.8 6.4 - 8.2 g/dL    Alb 3.0 (L) 3.4 - 5.0 g/dL    Albumin/Globulin Ratio 0.8 (L) 1.1 - 2.2    Total Bilirubin 0.5 0.0 - 1.0 mg/dL    Alkaline Phosphatase 108 40 - 129 U/L    ALT 35 10 - 40 U/L    AST 45 (H) 15 - 37 U/L    Globulin 3.8 g/dL   Brain Natriuretic Peptide   Result Value Ref Range    Pro- (H) 0 - 124 pg/mL   Protime-INR   Result Value Ref Range    Protime 13.3 (H) 9.8 - 13.0 sec    INR 1.17 (H) 0.86 - 1.14   EKG 12 Lead   Result Value Ref Range    Ventricular Rate 89 BPM    Atrial Rate 89 BPM    P-R Interval 152 ms    QRS Duration 78 ms    Q-T Interval 362 ms    QTc Calculation (Bazett) 440 ms    P Axis 5 degrees    R Axis 18 degrees    T Axis -8 degrees    Diagnosis       Normal sinus rhythmModerate voltage criteria for LVH, may be normal variantNonspecific ST and T wave abnormalityAbnormal ECGNo previous ECGs available       Xr Chest Standard (2 Vw)    Result Date: 6/30/2019  EXAMINATION: TWO XRAY VIEWS OF THE CHEST 6/30/2019 7:09 am COMPARISON: 06/28/2019 HISTORY: ORDERING SYSTEM PROVIDED HISTORY: hypoxia TECHNOLOGIST PROVIDED HISTORY: Reason for exam:->hypoxia Ordering Physician Provided Reason for Exam: Acute bronchitis/Cough Acuity: Chronic Type of Exam: Subsequent/Follow-up FINDINGS: Patchy bilateral airspace opacification persists throughout each lung. Heart size, mediastinal contours and pulmonary vascularity are stable. There is no pneumothorax or effusion. Multifocal pneumonia, unchanged. Xr Chest Standard (2 Vw)    Result Date: 6/26/2019  EXAMINATION: TWO XRAY VIEWS OF THE CHEST 6/26/2019 9:57 am COMPARISON: 06/16/2019.  HISTORY: ORDERING SYSTEM PROVIDED HISTORY: PAIN TECHNOLOGIST PROVIDED HISTORY: Reason for exam:->PAIN Ordering Physician Provided Reason for Exam: RECENTLY DISCHARGED FROM [facility] FOR PNEUMONIA, FINISHED ANTIBIOTICS, CONTINUED SYMPTOMS Acuity: Acute Type of Exam: Ongoing past 2 weeks. Multifocal pneumonia is favored. Asymmetric pulmonary edema superimposed upon COPD possible but less likely. Atypical pneumonia might be considered clinically. RECOMMENDATIONS: Recommend appropriate treatment for pneumonia with follow-up chest x-ray in 2-4 weeks. Follow-up noncontrast CT scan of the chest in 6 months may be necessary to document complete resolution. Fl Less Than 1 Hour    Result Date: 6/27/2019  EXAMINATION: SPOT FLUOROSCOPIC IMAGES FROM A BRONCHOSCOPY 6/27/2019 3:10 pm TECHNIQUE: Fluoroscopy was provided by the radiology department for procedure. Radiologist was not present during examination. FLUOROSCOPY DOSE AND TYPE OR TIME AND EXPOSURES: 18 seconds of fluoroscopy with 1 exposure. COMPARISON: CT of the thorax 06/16/2019. HISTORY: Intraprocedural imaging. FINDINGS: A single spot fluoroscopic image of the upper chest is presented for interpretation. There is partial imaging of a bronchoscope with distal tip projecting in the upper chest. A radiologist was not present for the procedure, please refer to the intra procedural report for additional information. INTRAPROCEDURAL FLUOROSCOPIC SPOT IMAGES FROM A BRONCHOSCOPY, AS ABOVE. SEE SEPARATE PROCEDURE REPORT FOR MORE INFORMATION. Xr Chest Portable    Result Date: 7/10/2019  EXAMINATION: ONE XRAY VIEW OF THE CHEST 7/10/2019 9:22 am COMPARISON: June 30, 2019 HISTORY: ORDERING SYSTEM PROVIDED HISTORY: PAIN TECHNOLOGIST PROVIDED HISTORY: Reason for exam:->PAIN Reason for Exam: sob Acuity: Acute Type of Exam: Initial FINDINGS: There is increased severity of diffuse ill-defined airspace disease of both lungs. No evidence of pneumothorax or pleural effusion. Cardiac and mediastinal shadows appear unchanged     Increased severity of diffuse bilateral airspace disease over the past couple weeks.      Xr Chest Portable    Result Date: 6/27/2019  EXAMINATION: ONE XRAY VIEW OF THE CHEST 6/27/2019 3:25 pm COMPARISON: June 26, 2019 HISTORY: ORDERING SYSTEM PROVIDED HISTORY: post bronchoscopy TECHNOLOGIST PROVIDED HISTORY: Reason for exam:->post bronchoscopy Ordering Physician Provided Reason for Exam: post bronch Acuity: Unknown Type of Exam: Unknown FINDINGS: There is increased diffuse ill-defined pulmonary opacity, with indistinctness of the vasculature and reticular/interstitial opacity. No pneumothorax. No evidence of pleural effusion either. Increased diffuse bilateral pulmonary disease, suspected to be due to increasing pulmonary edema. Superimposed atypical infectious or inflammatory process may be present as well     Xr Chest Portable    Result Date: 6/16/2019  EXAMINATION: ONE XRAY VIEW OF THE CHEST 6/16/2019 1:31 pm COMPARISON: 06/04/2019. HISTORY: ORDERING SYSTEM PROVIDED HISTORY: cough, sob, fever TECHNOLOGIST PROVIDED HISTORY: Reason for exam:->cough, sob, fever Ordering Physician Provided Reason for Exam: cough, sob, fever Acuity: Acute Type of Exam: Initial FINDINGS: The lungs are rated in lung volumes are diminished. The cardiomediastinal silhouette is stable. Aortic vascular calcification. Coarsening of the interstitial markings throughout the lungs, left greater than right, without significant interval change. No superimposed acute infiltrate, significant free pleural fluid or evidence of overt failure. No acute cardiopulmonary disease. Stable coarsening of the interstitial markings throughout the lungs suggesting an underlying pulmonary fibrosis. Xr Chest 1 Vw    Result Date: 6/28/2019  EXAMINATION: ONE XRAY VIEW OF THE CHEST 6/28/2019 7:25 am COMPARISON: 06/27/2019 HISTORY: ORDERING SYSTEM PROVIDED HISTORY: hypoxia TECHNOLOGIST PROVIDED HISTORY: Reason for exam:->hypoxia Ordering Physician Provided Reason for Exam: hypoxia Acuity: Unknown Type of Exam: Ongoing FINDINGS: Stable cardiomediastinal silhouette.   Persistent interstitial and airspace opacities in both lungs which may represent areas of edema or

## 2019-07-10 NOTE — PROGRESS NOTES
cannot be calculated (This lab value cannot be used to calculate CrCl because it is not a number: <0.5). Goal Trough Level: 15-18 mcg/mL    Assessment/Plan:Vanc 1gm given in ED-   Will initiate vancomycin 1250 mg IV every 12 hours. Trough to be on 7/11 at 2230     Thank you for the consult. Will continue to follow.   Laban Soulier Pharm D [id number]/81286:22 PM  .

## 2019-07-10 NOTE — H&P
Fiberoptic bronchoscopy history:     Nursing History and Physical reviewed and agreed upon: For additional findings, please see my notes in Epic today    Patient is allergic to penicillins; cefuroxime; doxycycline; imitrex [sumatriptan]; meperidine; sulfa antibiotics; bactrim [sulfamethoxazole-trimethoprim]; keflex [cephalexin]; and tape [adhesive tape]. Past Medical History:   Diagnosis Date    Anxiety     COPD (chronic obstructive pulmonary disease) ([address]     Depression     Hyperlipidemia     Hypertension     Rash     Thyroid disease        Past Surgical History:   Procedure Laterality Date    BRONCHOSCOPY N/A 6/27/2019    EBUS WF W/ANES. performed by Eliazar Meeks MD at [address]  6/27/2019    BRONCHOSCOPY/TRANSBRONCHIAL NEEDLE BIOPSY performed by Eliazar Meeks MD at [address]  6/27/2019    BRONCHOSCOPY/TRANSBRONCHIAL LUNG BIOPSY performed by Eliazar Meeks MD at [address]  6/27/2019    BRONCHOSCOPY ALVEOLAR LAVAGE performed by Eliazar Meeks MD at [address]  07/10/2019    CHOLECYSTECTOMY      HYSTERECTOMY, TOTAL ABDOMINAL         Allergies   Allergen Reactions    Penicillins Anaphylaxis     Tolerates Meropenem.  Cefuroxime      Tolerates Meropenem.  Doxycycline Hives    Imitrex [Sumatriptan] Other (See Comments)     Feels like pins and needles    Meperidine     Sulfa Antibiotics Hives    Bactrim [Sulfamethoxazole-Trimethoprim] Rash    Keflex [Cephalexin] Itching and Rash     Tolerates Meropenem.     Tape [place] Tape] Rash       Current Facility-Administered Medications   Medication Dose Route Frequency Provider Last Rate Last Dose    amitriptyline (ELAVIL) tablet 25 mg  25 mg Oral Nightly [place], [place]        aspirin chewable tablet 81 mg  81 mg Oral Daily [place], [place]   Stopped at 07/10/19 1356    furosemide (LASIX) tablet 20 mg  20 mg Oral Daily Lianet Eason

## 2019-07-11 LAB
ANION GAP SERPL CALCULATED.3IONS-SCNC: 13 MMOL/L (ref 3–16)
ANION GAP SERPL CALCULATED.3IONS-SCNC: ABNORMAL MMOL/L (ref 3–16)
ANTI-DSDNA IGG: <1 IU/ML (ref 0–9)
BASOPHILS ABSOLUTE: 0 K/UL (ref 0–0.2)
BASOPHILS RELATIVE PERCENT: 0.2 %
BUN BLDV-MCNC: 9 MG/DL (ref 7–20)
BUN BLDV-MCNC: ABNORMAL MG/DL (ref 7–20)
CALCIUM SERPL-MCNC: 8.9 MG/DL (ref 8.3–10.6)
CALCIUM SERPL-MCNC: ABNORMAL MG/DL (ref 8.3–10.6)
CHLORIDE BLD-SCNC: 104 MMOL/L (ref 99–110)
CHLORIDE BLD-SCNC: ABNORMAL MMOL/L (ref 99–110)
CO2: 24 MMOL/L (ref 21–32)
CO2: ABNORMAL MMOL/L (ref 21–32)
CREAT SERPL-MCNC: <0.5 MG/DL (ref 0.6–1.2)
CREAT SERPL-MCNC: ABNORMAL MG/DL (ref 0.6–1.2)
EKG ATRIAL RATE: 82 BPM
EKG ATRIAL RATE: 89 BPM
EKG DIAGNOSIS: NORMAL
EKG DIAGNOSIS: NORMAL
EKG P AXIS: 49 DEGREES
EKG P AXIS: 5 DEGREES
EKG P-R INTERVAL: 152 MS
EKG P-R INTERVAL: 156 MS
EKG Q-T INTERVAL: 362 MS
EKG Q-T INTERVAL: 386 MS
EKG QRS DURATION: 78 MS
EKG QRS DURATION: 82 MS
EKG QTC CALCULATION (BAZETT): 440 MS
EKG QTC CALCULATION (BAZETT): 450 MS
EKG R AXIS: -4 DEGREES
EKG R AXIS: 18 DEGREES
EKG T AXIS: -8 DEGREES
EKG T AXIS: 6 DEGREES
EKG VENTRICULAR RATE: 82 BPM
EKG VENTRICULAR RATE: 89 BPM
EOSINOPHILS ABSOLUTE: 0 K/UL (ref 0–0.6)
EOSINOPHILS RELATIVE PERCENT: 0 %
GFR AFRICAN AMERICAN: >60
GFR AFRICAN AMERICAN: ABNORMAL
GFR NON-AFRICAN AMERICAN: >60
GFR NON-AFRICAN AMERICAN: ABNORMAL
GLUCOSE BLD-MCNC: 141 MG/DL (ref 70–99)
GLUCOSE BLD-MCNC: 153 MG/DL (ref 70–99)
GLUCOSE BLD-MCNC: ABNORMAL MG/DL (ref 70–99)
HCT VFR BLD CALC: 31.9 % (ref 36–48)
HEMOGLOBIN: 10.2 G/DL (ref 12–16)
HIV AG/AB: NORMAL
HIV ANTIGEN: NORMAL
HIV-1 ANTIBODY: NORMAL
HIV-2 AB: NORMAL
LYMPHOCYTES ABSOLUTE: 1.4 K/UL (ref 1–5.1)
LYMPHOCYTES RELATIVE PERCENT: 12.2 %
MAGNESIUM: 2.4 MG/DL (ref 1.8–2.4)
MAGNESIUM: ABNORMAL MG/DL (ref 1.8–2.4)
MCH RBC QN AUTO: 27.2 PG (ref 26–34)
MCHC RBC AUTO-ENTMCNC: 32 G/DL (ref 31–36)
MCV RBC AUTO: 85.1 FL (ref 80–100)
MONOCYTES ABSOLUTE: 0.7 K/UL (ref 0–1.3)
MONOCYTES RELATIVE PERCENT: 6.2 %
NEUTROPHILS ABSOLUTE: 9.6 K/UL (ref 1.7–7.7)
NEUTROPHILS RELATIVE PERCENT: 81.4 %
PDW BLD-RTO: 16.4 % (ref 12.4–15.4)
PERFORMED ON: ABNORMAL
PHOSPHORUS: 3.2 MG/DL (ref 2.5–4.9)
PHOSPHORUS: ABNORMAL MG/DL (ref 2.5–4.9)
PLATELET # BLD: 170 K/UL (ref 135–450)
PMV BLD AUTO: 7.4 FL (ref 5–10.5)
POTASSIUM REFLEX MAGNESIUM: ABNORMAL MMOL/L (ref 3.5–5.1)
POTASSIUM SERPL-SCNC: 4.2 MMOL/L (ref 3.5–5.1)
POTASSIUM SERPL-SCNC: ABNORMAL MMOL/L (ref 3.5–5.1)
PROCALCITONIN: 0.11 NG/ML (ref 0–0.15)
RBC # BLD: 3.75 M/UL (ref 4–5.2)
RHEUMATOID FACTOR: <10 IU/ML
SODIUM BLD-SCNC: 141 MMOL/L (ref 136–145)
SODIUM BLD-SCNC: ABNORMAL MMOL/L (ref 136–145)
VANCOMYCIN TROUGH: 15.3 UG/ML (ref 10–20)
WBC # BLD: 11.8 K/UL (ref 4–11)

## 2019-07-11 PROCEDURE — 2580000003 HC RX 258: Performed by: PHYSICIAN ASSISTANT

## 2019-07-11 PROCEDURE — 36415 COLL VENOUS BLD VENIPUNCTURE: CPT

## 2019-07-11 PROCEDURE — 6370000000 HC RX 637 (ALT 250 FOR IP): Performed by: PHYSICIAN ASSISTANT

## 2019-07-11 PROCEDURE — 85025 COMPLETE CBC W/AUTO DIFF WBC: CPT

## 2019-07-11 PROCEDURE — 6360000002 HC RX W HCPCS: Performed by: PHYSICIAN ASSISTANT

## 2019-07-11 PROCEDURE — 84100 ASSAY OF PHOSPHORUS: CPT

## 2019-07-11 PROCEDURE — 6370000000 HC RX 637 (ALT 250 FOR IP): Performed by: INTERNAL MEDICINE

## 2019-07-11 PROCEDURE — 93010 ELECTROCARDIOGRAM REPORT: CPT | Performed by: INTERNAL MEDICINE

## 2019-07-11 PROCEDURE — 80202 ASSAY OF VANCOMYCIN: CPT

## 2019-07-11 PROCEDURE — 99233 SBSQ HOSP IP/OBS HIGH 50: CPT | Performed by: INTERNAL MEDICINE

## 2019-07-11 PROCEDURE — 2060000000 HC ICU INTERMEDIATE R&B

## 2019-07-11 PROCEDURE — 94761 N-INVAS EAR/PLS OXIMETRY MLT: CPT

## 2019-07-11 PROCEDURE — 94669 MECHANICAL CHEST WALL OSCILL: CPT

## 2019-07-11 PROCEDURE — 93005 ELECTROCARDIOGRAM TRACING: CPT | Performed by: INTERNAL MEDICINE

## 2019-07-11 PROCEDURE — 6360000002 HC RX W HCPCS: Performed by: INTERNAL MEDICINE

## 2019-07-11 PROCEDURE — 99232 SBSQ HOSP IP/OBS MODERATE 35: CPT | Performed by: INTERNAL MEDICINE

## 2019-07-11 PROCEDURE — 83735 ASSAY OF MAGNESIUM: CPT

## 2019-07-11 PROCEDURE — 84145 PROCALCITONIN (PCT): CPT

## 2019-07-11 PROCEDURE — 80048 BASIC METABOLIC PNL TOTAL CA: CPT

## 2019-07-11 PROCEDURE — 2700000000 HC OXYGEN THERAPY PER DAY

## 2019-07-11 PROCEDURE — 94640 AIRWAY INHALATION TREATMENT: CPT

## 2019-07-11 PROCEDURE — 2580000003 HC RX 258

## 2019-07-11 RX ORDER — SODIUM CHLORIDE 9 MG/ML
INJECTION, SOLUTION INTRAVENOUS
Status: COMPLETED
Start: 2019-07-11 | End: 2019-07-11

## 2019-07-11 RX ORDER — FUROSEMIDE 10 MG/ML
40 INJECTION INTRAMUSCULAR; INTRAVENOUS DAILY
Status: DISCONTINUED | OUTPATIENT
Start: 2019-07-11 | End: 2019-07-12

## 2019-07-11 RX ORDER — IPRATROPIUM BROMIDE AND ALBUTEROL SULFATE 2.5; .5 MG/3ML; MG/3ML
1 SOLUTION RESPIRATORY (INHALATION) 4 TIMES DAILY
Status: DISCONTINUED | OUTPATIENT
Start: 2019-07-11 | End: 2019-07-14 | Stop reason: HOSPADM

## 2019-07-11 RX ADMIN — IPRATROPIUM BROMIDE AND ALBUTEROL SULFATE 1 AMPULE: .5; 3 SOLUTION RESPIRATORY (INHALATION) at 11:08

## 2019-07-11 RX ADMIN — MEROPENEM 1 G: 1 INJECTION, POWDER, FOR SOLUTION INTRAVENOUS at 04:10

## 2019-07-11 RX ADMIN — Medication 10 ML: at 09:37

## 2019-07-11 RX ADMIN — MEROPENEM 1 G: 1 INJECTION, POWDER, FOR SOLUTION INTRAVENOUS at 21:08

## 2019-07-11 RX ADMIN — ENOXAPARIN SODIUM 40 MG: 40 INJECTION SUBCUTANEOUS at 09:37

## 2019-07-11 RX ADMIN — TRAZODONE HYDROCHLORIDE 50 MG: 50 TABLET ORAL at 21:04

## 2019-07-11 RX ADMIN — IPRATROPIUM BROMIDE AND ALBUTEROL SULFATE 1 AMPULE: .5; 3 SOLUTION RESPIRATORY (INHALATION) at 07:05

## 2019-07-11 RX ADMIN — LORAZEPAM 0.5 MG: 0.5 TABLET ORAL at 15:57

## 2019-07-11 RX ADMIN — IPRATROPIUM BROMIDE AND ALBUTEROL SULFATE 1 AMPULE: .5; 3 SOLUTION RESPIRATORY (INHALATION) at 15:42

## 2019-07-11 RX ADMIN — AMITRIPTYLINE HYDROCHLORIDE 25 MG: 25 TABLET, FILM COATED ORAL at 21:04

## 2019-07-11 RX ADMIN — IPRATROPIUM BROMIDE AND ALBUTEROL SULFATE 1 AMPULE: .5; 3 SOLUTION RESPIRATORY (INHALATION) at 20:04

## 2019-07-11 RX ADMIN — MUPIROCIN: 20 OINTMENT TOPICAL at 21:04

## 2019-07-11 RX ADMIN — SERTRALINE 200 MG: 100 TABLET, FILM COATED ORAL at 09:37

## 2019-07-11 RX ADMIN — VANCOMYCIN HYDROCHLORIDE 1250 MG: 1 INJECTION, POWDER, LYOPHILIZED, FOR SOLUTION INTRAVENOUS at 23:05

## 2019-07-11 RX ADMIN — IPRATROPIUM BROMIDE AND ALBUTEROL SULFATE 1 AMPULE: .5; 3 SOLUTION RESPIRATORY (INHALATION) at 03:04

## 2019-07-11 RX ADMIN — Medication 10 ML: at 21:04

## 2019-07-11 RX ADMIN — MIRTAZAPINE 30 MG: 15 TABLET, FILM COATED ORAL at 21:04

## 2019-07-11 RX ADMIN — MEROPENEM 1 G: 1 INJECTION, POWDER, FOR SOLUTION INTRAVENOUS at 11:19

## 2019-07-11 RX ADMIN — ASPIRIN 81 MG 81 MG: 81 TABLET ORAL at 09:37

## 2019-07-11 RX ADMIN — POTASSIUM CHLORIDE 10 MEQ: 10 TABLET, EXTENDED RELEASE ORAL at 09:37

## 2019-07-11 RX ADMIN — METHYLPREDNISOLONE SODIUM SUCCINATE 40 MG: 40 INJECTION, POWDER, FOR SOLUTION INTRAMUSCULAR; INTRAVENOUS at 04:10

## 2019-07-11 RX ADMIN — VANCOMYCIN HYDROCHLORIDE 1250 MG: 1 INJECTION, POWDER, LYOPHILIZED, FOR SOLUTION INTRAVENOUS at 11:20

## 2019-07-11 RX ADMIN — METHYLPREDNISOLONE SODIUM SUCCINATE 40 MG: 40 INJECTION, POWDER, FOR SOLUTION INTRAMUSCULAR; INTRAVENOUS at 14:39

## 2019-07-11 RX ADMIN — FUROSEMIDE 20 MG: 20 TABLET ORAL at 09:38

## 2019-07-11 RX ADMIN — MUPIROCIN: 20 OINTMENT TOPICAL at 09:37

## 2019-07-11 RX ADMIN — SODIUM CHLORIDE 250 ML: 9 INJECTION, SOLUTION INTRAVENOUS at 21:08

## 2019-07-11 RX ADMIN — NADOLOL 40 MG: 40 TABLET ORAL at 21:04

## 2019-07-11 RX ADMIN — LEVOTHYROXINE SODIUM 150 MCG: 150 TABLET ORAL at 09:37

## 2019-07-11 ASSESSMENT — PAIN SCALES - GENERAL
PAINLEVEL_OUTOF10: 0
PAINLEVEL_OUTOF10: 0

## 2019-07-11 NOTE — PROGRESS NOTES
Vitals:    07/11/19 0500 07/11/19 0600 07/11/19 0700 07/11/19 0800   BP: 102/73 (!) 141/79 114/63 (!) 128/95   Pulse: 74 83 66 87   Resp: 17 19 18 (!) 31   Temp:   97.9 °F (36.6 °C)    TempSrc:   Oral    SpO2:  95% 92% 92%   Weight:  205 lb 7.5 oz (93.2 kg)     Height:           BMP  Recent Labs     07/10/19  1000 07/11/19  0454    140   K 4.0 4.1  4.1   CO2 26 41*   BUN 9 8   CREATININE <0.5* <0.5*   MG  --  2.40   PHOS  --  3.1   CALCIUM 8.8 8.8   GLUCOSE 161* 159*        CBC  Recent Labs     07/10/19  0915 07/11/19  0454   WBC 16.6* 11.8*   HGB 11.5* 10.2*   HCT 35.7* 31.9*    170          Intake/Output Summary (Last 24 hours) at 7/11/2019 0847  Last data filed at 7/11/2019 0600  Gross per 24 hour   Intake 590 ml   Output 2870 ml   Net -2280 ml           Pt is seen lying in bed. They are alert and oriented times 4. Speech is Clear. RASS is 0  The pt is currently on 4 L O2. Pain is rated as 0/10. Pt's tolerable level of pain is 0/10. The pt stated their pain is located none. Pain is described as none  IV access is located in the pt's R FA and Hand. See EMAR for current infusions and rates. The pt is using Cisneros  to void. See flowsheets for assessment. AM assessment completed at this time (see flowsheet), VSS on monitor with RR >10 while on 4L. Pt has no c/o or needs at this time. Bed in low locked position with 3/4 rails in place.  Call light and bed tray in place, will continue to monitor closely Thanh Floyd RN

## 2019-07-11 NOTE — DISCHARGE INSTR - COC
Administered Date(s) Administered    Influenza Virus Vaccine 01/15/2016, 02/13/2016    Pneumococcal Conjugate 13-valent ([id number]) 06/18/2019    Pneumococcal Polysaccharide ([id number]) 11/14/2013, 04/03/2014       Active Problems:  Patient Active Problem List   Diagnosis Code    Chest pain R07.9    HTN (hypertension) I10    Hyperlipidemia with target LDL less than 130 E78.5    Depression F32.9    Hypokalemia E87.6    Insomnia G47.00    Trochanteric bursitis of both hips M70.61, M70.62    Migraine G43.909    Syncope R55    Gastrointestinal hemorrhage associated with gastric ulcer K25.4    Acute blood loss anemia D62    Fatigue R53.83    Hypothyroidism E03.9    Mild single current episode of major depressive disorder ([facility]) F32.0    Anxiety F41.9    Acute respiratory distress R06.03    Acute on chronic respiratory failure with hypoxia ([facility]) J96.21    Bronchitis J40    Bronchospasm J98.01    Hypoxia R09.02    Sepsis ([facility]) A41.9    Multifocal pneumonia J18.9    Atrial fibrillation ([facility]) I48.91    Bronchiectasis with acute exacerbation ([facility]) J47.1    Pneumonia due to organism J18.9    Atypical pneumonia J18.9    Acute bronchitis with bronchospasm J20.9    Acute on chronic diastolic congestive heart failure ([facility]) I50.33    Class 2 obesity with body mass index (BMI) of 39.0 to 39.9 in adult E66.9, Z68.39    Abnormal chest CT R93.89    Fever R50.9    Acute diastolic heart failure ([facility]) I50.31    ILD (interstitial lung disease) ([facility]) J84.9    Acute respiratory failure with hypoxia ([facility]) J96.01    Restrictive lung disease J98.4    Acute hypoxemic respiratory failure ([facility]) J96.01    Abnormal chest x-ray R93.89    Abnormal CT of the chest R93.89    Dyspnea and respiratory abnormalities R06.00, R06.89       Isolation/Infection:   Isolation          No Isolation            Nurse Assessment:  Last Vital Signs: /65   Pulse 73   Temp 97.9 °F (36.6 °C) (Oral)   Resp 28   Ht 5' 4\" (1.626 m)   Wt 205 lb 7.5 oz (93.2 kg)   SpO2 92%   BMI 35.27 kg/m²     Last documented pain score (0-10 scale): Pain Level: 0  Last Weight:   Wt Readings from Last 1 Encounters:   07/11/19 205 lb 7.5 oz (93.2 kg)     Mental Status:  oriented, alert, coherent, logical, thought processes intact and able to concentrate and follow conversation    IV Access:  - None    Nursing Mobility/ADLs:  Walking   Independent  Transfer  Independent  Bathing  Independent  Dressing  Independent  [address]  Independent  Med Delivery   whole    Wound Care Documentation and Therapy:        Elimination:  Continence:   · Bowel: Yes  · Bladder: Yes  Urinary Catheter: None   Colostomy/Ileostomy/Ileal Conduit: No       Date of Last BM:     Intake/Output Summary (Last 24 hours) at 7/11/2019 1032  Last data filed at 7/11/2019 0943  Gross per 24 hour   Intake 1490 ml   Output 3145 ml   Net -1655 ml     I/O last 3 completed shifts: In: 0 [P.O.:240; IV Piggyback:350]  Out: 2870 [Urine:2870]    Safety Concerns:     None    Impairments/Disabilities:      None    Nutrition Therapy:  Current Nutrition Therapy:   - Oral Diet:  General    Routes of Feeding: Oral  Liquids: Thin Liquids  Daily Fluid Restriction: no  Last Modified Barium Swallow with Video (Video Swallowing Test): not done    Treatments at the Time of Hospital Discharge:   Respiratory Treatments:   Oxygen Therapy:  is on oxygen at 3 L/min per nasal cannula. Ventilator:    - No ventilator support    Rehab Therapies:   Weight Bearing Status/Restrictions: No weight bearing restirctions  Other Medical Equipment (for information only, NOT a DME order):     Other Treatments:     Patient's personal belongings (please select all that are sent with patient):  None    RN SIGNATURE:  Electronically signed by Silver Gonzalez RN on 7/14/19 at 2:14 PM    CASE MANAGEMENT/SOCIAL WORK SECTION    Inpatient Status Date: Inpatient

## 2019-07-11 NOTE — PROGRESS NOTES
(mini-bag)  1 g Intravenous Q8H    methylPREDNISolone  40 mg Intravenous Q12H    vancomycin  1,250 mg Intravenous Q12H    ipratropium-albuterol  1 ampule Inhalation Q4H     PRN Meds:  hydrOXYzine, LORazepam, sodium chloride flush, magnesium hydroxide, ondansetron, potassium chloride, magnesium sulfate, albuterol, acetaminophen    Results:  CBC:   Recent Labs     07/10/19  0915 07/11/19  0454   WBC 16.6* 11.8*   HGB 11.5* 10.2*   HCT 35.7* 31.9*   MCV 84.5 85.1    170     BMP:   Recent Labs     07/10/19  1000 07/11/19  0454    140   K 4.0 4.1  4.1    104   CO2 26 41*   PHOS  --  3.1   BUN 9 8   CREATININE <0.5* <0.5*     LIVER PROFILE:   Recent Labs     07/10/19  1000   AST 45*   ALT 35   BILITOT 0.5   ALKPHOS 108       Cultures:  BAL 7/10 Pending   BCx and UCx Pending     Films:  Chest x-ray 7/10/2019 imaging was reviewed by me and showed worsening bilateral pulmonary infiltrates     Bronchoscopy 6/28/2019   left upper lobe, transbronchial biopsies:  - Fragments of benign bronchial wall. - Negative for granulomas or other significant inflammation.  - Negative for malignancy. A. Right Paratracheal Lymph Node, Transbronchial Fine Needle Aspirate:       -  No malignant cells identified. B. Subcarinal Lymph Node, Transbronchial Fine Needle Aspirate:       -  No malignant cells identified. C. Lung, Left Upper Lobe, Bronchoalveolar Lavage:       -  No malignant cells identified. D. Lung, Bronchial Washings:       -  No malignant cells identified.     Subcarinal TB NA no phenotypically abnormal cells  AFB negative  Eosinophils 6%     CRP 56.9  AXEL negative  IgG 1310           ASSESSMENT:  · Acute hypoxemic respiratory failure  · Abnormal CT chest/chest x-ray- DDx viral/atypical infection, , DAH, HP, vasculitis, NSIP, eosinophilic pneumonitis.   Nondiagnostic transbronchial biopsy 6/27/2019  · Fever  · Atrial fibrillation  · Grade 2 diastolic dysfunction       PLAN:  · Bilevel non-invasive

## 2019-07-11 NOTE — PROGRESS NOTES
[facility] ENDOSCOPY    BRONCHOSCOPY  6/27/2019    BRONCHOSCOPY/TRANSBRONCHIAL NEEDLE BIOPSY performed by Tessy Mckee MD at [address] [place] [address]  6/27/2019    BRONCHOSCOPY/TRANSBRONCHIAL LUNG BIOPSY performed by Tessy Mckee MD at [address] [place] [address]  6/27/2019    BRONCHOSCOPY ALVEOLAR LAVAGE performed by Tessy Mckee MD at [address] [place] [address]  07/10/2019    BRONCHOSCOPY N/A 7/10/2019    BRONCHOSCOPY ALVEOLAR LAVAGE performed by Janet Jama MD at [address] [place] [address], TOTAL ABDOMINAL         Level of Consciousness: Alert, Oriented, and Cooperative = 0    Level of Activity: Walking with assistance = 1    Respiratory Pattern: Dyspnea with exertion;Irregular pattern;or RR less than 6 = 2    Breath Sounds: Diminshed bilaterally and/or crackles = 2    Sputum   ,  , Sputum How Obtained: Cough on request  Cough: Strong, productive = 1    Vital Signs   BP (!) 127/90   Pulse 85   Temp 97.8 °F (36.6 °C) (Oral)   Resp 25   Ht 5' 4\" (1.626 m)   Wt 205 lb 7.5 oz (93.2 kg)   SpO2 96%   BMI 35.27 kg/m²   SPO2 (COPD values may differ): 88-89% on room air or greater than 92% on FiO2 28- 35% = 2    Peak Flow (asthma only): not applicable = 0    RSI: 7-8 = BID and Q4HPRN (every four hours as needed) for dyspnea        Plan       Goals: medication delivery     Patient/caregiver was educated on the proper method of use for Respiratory Care Devices:  Yes      Level of patient/caregiver understanding able to:   ? Verbalize understanding   ? Demonstrate understanding       ? Teach back        ? Needs reinforcement       ? No available caregiver               ? Other:     Response to education:  Good     Is patient being placed on Home Treatment Regimen? Yes     Does the patient have everything they need prior to discharge?   Yes     Comments: chart reviewed, pt assessed    Plan of Care: duoneb QID and Q4prn    Electronically signed by Julissa Lopez ROSALINE Navarrete on 7/11/2019 at 4:23 PM    Respiratory Protocol Guidelines     1. Assessment and treatment by Respiratory Therapy will be initiated for medication and therapeutic interventions upon initiation of aerosolized medication. 2. Physician will be contacted for respiratory rate (RR) greater than 35 breaths per minute. Therapy will be held for heart rate (HR) greater than 140 beats per minute, pending direction from physician. 3. Bronchodilators will be administered via Metered Dose Inhaler (MDI) with spacer when the following criteria are met:  a. Alert and cooperative     b. HR < 140 bpm  c. RR < 30 bpm                d. Can demonstrate a 2-3 second inspiratory hold  4. Bronchodilators will be administered via Hand Held Nebulizer [facility]) to patients when ANY of the following criteria are met  a. Incognizant or uncooperative          b. Patients treated with HHN at Home        c. Unable to demonstrate proper use of MDI with spacer     d. RR > 30 bpm   5. Bronchodilators will be delivered via Metered Dose Inhaler (MDI), HHN, Aerogen to intubated patients on mechanical ventilation. 6. Inhalation medication orders will be delivered and/or substituted as outlined below. Aerosolized Medications Ordering and Administration Guidelines:    1. All Medications will be ordered by a physician, and their frequency and/or modality will be adjusted as defined by the patients Respiratory Severity Index (RSI) score. 2. If the patient does not have documented COPD, consider discontinuing anticholinergics when RSI is less than 9.  3. If the bronchospasm worsens (increased RSI), then the bronchodilator frequency can be increased to a maximum of every 4 hours. If greater than every 4 hours is required, the physician will be contacted. 4. If the bronchospasm improves, the frequency of the bronchodilator can be decreased, based on the patient's RSI, but not less than home treatment regimen frequency.   5. Bronchodilator(s) will

## 2019-07-11 NOTE — PROGRESS NOTES
Dr. Shakila Chacon & interdisciplinary team @ bedside. Discussed labs, meds, VS, I/O's, 02 requirements, U/O, assessment, & plan of care for today. Please see progress note & new orders for details.    Raymond Baez RN on 7/11/19 at 9:58 AM

## 2019-07-11 NOTE — PROGRESS NOTES
CO2 26 24  NA*   PHOS  --  3.2  NA*   BUN 9 9  NA*   CREATININE <0.5* <0.5*  NA*     BNP: No results for input(s): BNP in the last 72 hours. PT/INR:   Recent Labs     07/10/19  1000   PROTIME 13.3*   INR 1.17*     APTT: No results for input(s): APTT in the last 72 hours. CARDIAC ENZYMES:   Recent Labs     07/10/19  1000   TROPONINI <0.01     FASTING LIPID PANEL:  Lab Results   Component Value Date    CHOL 185 05/25/2018    HDL 45 05/25/2018    TRIG 137 05/25/2018     LIVER PROFILE:   Recent Labs     07/10/19  1000   AST 45*   ALT 35   BILITOT 0.5   ALKPHOS 108          XR CHEST PORTABLE   Final Result   Increased severity of diffuse bilateral airspace disease over the past couple   weeks. Assessment:  Active Problems:    Sepsis ([address]    Multifocal pneumonia    Pneumonia due to organism    Acute respiratory failure with hypoxia (HCC)    Acute hypoxemic respiratory failure (HCC)    Abnormal chest x-ray    Abnormal CT of the chest    Dyspnea and respiratory abnormalities  Resolved Problems:    * No resolved hospital problems. *      Plan:    Acute on Chronic hypoxic respiratory failure   - Requires 2L NC O2 at home but was hypoxic in to the 70's on home O, Required NRB in ED . Admitted to ICU . Now on 4 to 5L NC O2. Hypoxic with activity    - 2/2 recurrent PNA   - Continue supplemental O2 and wean as tolerated   - s/P bronch .   - sats better, transfer out Of ICU     Sepsis ( POA - febrile, leukocytosis, tachynpnea,  Hypoxia, source)   - 2/2 PNA   - Cx pending   - Continue IV Abx   - no IVF due to CHF     Recurrent Pneumonia   - Patient has had multiple admissions for PNA despite multiple rounds of Abx   - She has completed Merrem and Vanc during her last admission   - Returns with worsening PNA on CXR, concerns for gram negative organism   - Merrem/Vanc D#1   - ICU   - Pulm consult: s/p bronch 7/10, initiated workup for vasculitis, may require open lung bx per pulm  - IBD, IV Steroids   - Follow up on Cx      Possible UTI   - No significant sx   - UA +LE and WBC   - Urine Cx added   - Already on IV Abx, monitor for Cx      Chronic dCHF   - Continue home Lasix   - Appears well compensated      Atrial fibrillation   - Dx during a prior admission, converted back to NSR quickly and was not started on [facility]  - Rate is now controlled   - Continue home BB     Hypothyroidism   - Continue home synthroid      Obesity  - Body mass index is 35.48 kg/m². - Complicating assessment and treatment.  Placing patient at risk for multiple co-morbidities as well as early death and contributing to the patient's presentation.   - Counseled on weight loss.     DVT Prophylaxis: Lovenox  DIET GENERAL;  Code Status: Full Code      transfer to PCU    Ray Hurtado MD 7/11/2019 3:07 PM

## 2019-07-12 LAB
ANCA IFA: NORMAL
ANION GAP SERPL CALCULATED.3IONS-SCNC: 13 MMOL/L (ref 3–16)
BASOPHILS ABSOLUTE: 0.1 K/UL (ref 0–0.2)
BASOPHILS RELATIVE PERCENT: 0.4 %
BUN BLDV-MCNC: 10 MG/DL (ref 7–20)
CALCIUM SERPL-MCNC: 8.7 MG/DL (ref 8.3–10.6)
CHLORIDE BLD-SCNC: 101 MMOL/L (ref 99–110)
CO2: 26 MMOL/L (ref 21–32)
CREAT SERPL-MCNC: <0.5 MG/DL (ref 0.6–1.2)
CULTURE, RESPIRATORY: NORMAL
EOSINOPHILS ABSOLUTE: 0.3 K/UL (ref 0–0.6)
EOSINOPHILS RELATIVE PERCENT: 2 %
GFR AFRICAN AMERICAN: >60
GFR NON-AFRICAN AMERICAN: >60
GLUCOSE BLD-MCNC: 171 MG/DL (ref 70–99)
GRAM STAIN RESULT: NORMAL
HCT VFR BLD CALC: 32.7 % (ref 36–48)
HEMOGLOBIN: 10.5 G/DL (ref 12–16)
LYMPHOCYTES ABSOLUTE: 1.8 K/UL (ref 1–5.1)
LYMPHOCYTES RELATIVE PERCENT: 13.8 %
MCH RBC QN AUTO: 27.2 PG (ref 26–34)
MCHC RBC AUTO-ENTMCNC: 32 G/DL (ref 31–36)
MCV RBC AUTO: 84.9 FL (ref 80–100)
MONOCYTES ABSOLUTE: 0.8 K/UL (ref 0–1.3)
MONOCYTES RELATIVE PERCENT: 6 %
NEUTROPHILS ABSOLUTE: 10 K/UL (ref 1.7–7.7)
NEUTROPHILS RELATIVE PERCENT: 77.8 %
PDW BLD-RTO: 16.8 % (ref 12.4–15.4)
PHOSPHORUS: 3.3 MG/DL (ref 2.5–4.9)
PLATELET # BLD: 200 K/UL (ref 135–450)
PMV BLD AUTO: 7.4 FL (ref 5–10.5)
POTASSIUM REFLEX MAGNESIUM: 4 MMOL/L (ref 3.5–5.1)
RBC # BLD: 3.86 M/UL (ref 4–5.2)
SODIUM BLD-SCNC: 140 MMOL/L (ref 136–145)
WBC # BLD: 12.9 K/UL (ref 4–11)

## 2019-07-12 PROCEDURE — 99233 SBSQ HOSP IP/OBS HIGH 50: CPT | Performed by: INTERNAL MEDICINE

## 2019-07-12 PROCEDURE — 6360000002 HC RX W HCPCS: Performed by: INTERNAL MEDICINE

## 2019-07-12 PROCEDURE — 99232 SBSQ HOSP IP/OBS MODERATE 35: CPT | Performed by: PHYSICIAN ASSISTANT

## 2019-07-12 PROCEDURE — 2060000000 HC ICU INTERMEDIATE R&B

## 2019-07-12 PROCEDURE — 2700000000 HC OXYGEN THERAPY PER DAY

## 2019-07-12 PROCEDURE — 36415 COLL VENOUS BLD VENIPUNCTURE: CPT

## 2019-07-12 PROCEDURE — 6360000002 HC RX W HCPCS: Performed by: PHYSICIAN ASSISTANT

## 2019-07-12 PROCEDURE — 6370000000 HC RX 637 (ALT 250 FOR IP): Performed by: PHYSICIAN ASSISTANT

## 2019-07-12 PROCEDURE — 80048 BASIC METABOLIC PNL TOTAL CA: CPT

## 2019-07-12 PROCEDURE — 6370000000 HC RX 637 (ALT 250 FOR IP): Performed by: INTERNAL MEDICINE

## 2019-07-12 PROCEDURE — 85025 COMPLETE CBC W/AUTO DIFF WBC: CPT

## 2019-07-12 PROCEDURE — 2580000003 HC RX 258: Performed by: PHYSICIAN ASSISTANT

## 2019-07-12 PROCEDURE — 94640 AIRWAY INHALATION TREATMENT: CPT

## 2019-07-12 PROCEDURE — 94669 MECHANICAL CHEST WALL OSCILL: CPT

## 2019-07-12 PROCEDURE — 84100 ASSAY OF PHOSPHORUS: CPT

## 2019-07-12 RX ORDER — FUROSEMIDE 40 MG/1
40 TABLET ORAL DAILY
Status: DISCONTINUED | OUTPATIENT
Start: 2019-07-13 | End: 2019-07-14 | Stop reason: HOSPADM

## 2019-07-12 RX ORDER — FLUTICASONE PROPIONATE 50 MCG
1 SPRAY, SUSPENSION (ML) NASAL DAILY
Status: DISCONTINUED | OUTPATIENT
Start: 2019-07-12 | End: 2019-07-14 | Stop reason: HOSPADM

## 2019-07-12 RX ADMIN — MEROPENEM 1 G: 1 INJECTION, POWDER, FOR SOLUTION INTRAVENOUS at 13:17

## 2019-07-12 RX ADMIN — MEROPENEM 1 G: 1 INJECTION, POWDER, FOR SOLUTION INTRAVENOUS at 20:01

## 2019-07-12 RX ADMIN — AMITRIPTYLINE HYDROCHLORIDE 25 MG: 25 TABLET, FILM COATED ORAL at 20:01

## 2019-07-12 RX ADMIN — IPRATROPIUM BROMIDE AND ALBUTEROL SULFATE 1 AMPULE: .5; 3 SOLUTION RESPIRATORY (INHALATION) at 07:01

## 2019-07-12 RX ADMIN — FLUTICASONE PROPIONATE 1 SPRAY: 50 SPRAY, METERED NASAL at 13:17

## 2019-07-12 RX ADMIN — IPRATROPIUM BROMIDE AND ALBUTEROL SULFATE 1 AMPULE: .5; 3 SOLUTION RESPIRATORY (INHALATION) at 15:08

## 2019-07-12 RX ADMIN — VANCOMYCIN HYDROCHLORIDE 1250 MG: 1 INJECTION, POWDER, LYOPHILIZED, FOR SOLUTION INTRAVENOUS at 11:30

## 2019-07-12 RX ADMIN — Medication 10 ML: at 09:46

## 2019-07-12 RX ADMIN — MUPIROCIN: 20 OINTMENT TOPICAL at 20:01

## 2019-07-12 RX ADMIN — METHYLPREDNISOLONE SODIUM SUCCINATE 40 MG: 40 INJECTION, POWDER, FOR SOLUTION INTRAMUSCULAR; INTRAVENOUS at 03:53

## 2019-07-12 RX ADMIN — FUROSEMIDE 40 MG: 10 INJECTION, SOLUTION INTRAMUSCULAR; INTRAVENOUS at 09:45

## 2019-07-12 RX ADMIN — MIRTAZAPINE 30 MG: 15 TABLET, FILM COATED ORAL at 20:01

## 2019-07-12 RX ADMIN — ASPIRIN 81 MG 81 MG: 81 TABLET ORAL at 09:45

## 2019-07-12 RX ADMIN — METHYLPREDNISOLONE SODIUM SUCCINATE 40 MG: 40 INJECTION, POWDER, FOR SOLUTION INTRAMUSCULAR; INTRAVENOUS at 15:03

## 2019-07-12 RX ADMIN — IPRATROPIUM BROMIDE AND ALBUTEROL SULFATE 1 AMPULE: .5; 3 SOLUTION RESPIRATORY (INHALATION) at 20:07

## 2019-07-12 RX ADMIN — ACETAMINOPHEN 650 MG: 325 TABLET ORAL at 01:31

## 2019-07-12 RX ADMIN — SERTRALINE 200 MG: 100 TABLET, FILM COATED ORAL at 09:45

## 2019-07-12 RX ADMIN — LEVOTHYROXINE SODIUM 150 MCG: 150 TABLET ORAL at 09:45

## 2019-07-12 RX ADMIN — NADOLOL 40 MG: 40 TABLET ORAL at 20:01

## 2019-07-12 RX ADMIN — MEROPENEM 1 G: 1 INJECTION, POWDER, FOR SOLUTION INTRAVENOUS at 03:53

## 2019-07-12 RX ADMIN — MUPIROCIN: 20 OINTMENT TOPICAL at 09:46

## 2019-07-12 RX ADMIN — IPRATROPIUM BROMIDE AND ALBUTEROL SULFATE 1 AMPULE: .5; 3 SOLUTION RESPIRATORY (INHALATION) at 10:43

## 2019-07-12 RX ADMIN — ENOXAPARIN SODIUM 40 MG: 40 INJECTION SUBCUTANEOUS at 09:46

## 2019-07-12 RX ADMIN — POTASSIUM CHLORIDE 10 MEQ: 10 TABLET, EXTENDED RELEASE ORAL at 09:45

## 2019-07-12 RX ADMIN — TRAZODONE HYDROCHLORIDE 50 MG: 50 TABLET ORAL at 20:01

## 2019-07-12 ASSESSMENT — PAIN DESCRIPTION - LOCATION: LOCATION: GENERALIZED

## 2019-07-12 ASSESSMENT — PAIN DESCRIPTION - PROGRESSION: CLINICAL_PROGRESSION: GRADUALLY WORSENING

## 2019-07-12 ASSESSMENT — PAIN - FUNCTIONAL ASSESSMENT: PAIN_FUNCTIONAL_ASSESSMENT: ACTIVITIES ARE NOT PREVENTED

## 2019-07-12 ASSESSMENT — PAIN SCALES - GENERAL
PAINLEVEL_OUTOF10: 4
PAINLEVEL_OUTOF10: 0

## 2019-07-12 ASSESSMENT — PAIN DESCRIPTION - DESCRIPTORS: DESCRIPTORS: ACHING;DISCOMFORT

## 2019-07-12 ASSESSMENT — PAIN DESCRIPTION - PAIN TYPE: TYPE: ACUTE PAIN

## 2019-07-12 ASSESSMENT — PAIN DESCRIPTION - FREQUENCY: FREQUENCY: INTERMITTENT

## 2019-07-12 ASSESSMENT — PAIN DESCRIPTION - ORIENTATION: ORIENTATION: RIGHT;LEFT;MID

## 2019-07-12 ASSESSMENT — PAIN DESCRIPTION - ONSET: ONSET: ON-GOING

## 2019-07-12 NOTE — PROGRESS NOTES
as tolerated   - s/P bronch . - sats better, transfer out Of ICU to PCU    Sepsis ( POA - febrile, leukocytosis, tachynpnea,  Hypoxia, source)   - 2/2 PNA   - Cx pending   - Continue IV Abx   - no IVF due to CHF     Recurrent Pneumonia   - Patient has had multiple admissions for PNA despite multiple rounds of Abx   - She has completed Merrem and Vanc during her last admission   - Returns with worsening PNA on CXR, concerns for gram negative organism   - Merrem/Vanc D#3, can stop Vanc   - Pulm consult: s/p bronch 7/10, initiated workup for vasculitis (negative so far), may require open lung bx per pulm  - IBD, IV Steroids   - Follow up on Cx--BAL fungus Cx: candida rare growth,  Resp Cx with yeast    - Repeat CXR in the morning    Possible UTI   - No significant sx   - UA +LE and WBC   - Urine Cx- GNR   - Already on IV Abx, monitor for Cx      Chronic dCHF   - Was started on IV Lasix-->will switch to PO Lasix 40mg (home dose is 20)   - Had some swelling in B ankles, now resolved  - net deficit 6.7L      Atrial fibrillation   - Dx during a prior admission, converted back to NSR quickly and was not started on AC  - Rate is now controlled   - Continue home BB     Hypothyroidism   - Continue home synthroid      Obesity  - Body mass index is 35.48 kg/m². - Complicating assessment and treatment. Placing patient at risk for multiple co-morbidities as well as early death and contributing to the patient's presentation.   - Counseled on weight loss.     DVT Prophylaxis: Lovenox  DIET GENERAL;  Code Status: Full Code    Hospital course and plan of care discussed with Dr. Eliazar Carmona.       Venus Gonsales PA-C  7/12/2019 12:17 PM

## 2019-07-12 NOTE — CARE COORDINATION
INTERDISCIPLINARY PLAN OF CARE CONFERENCE    Date/Time: 7/12/2019 12:04 PM  Completed by: Olinda Austin, Case Management      Patient Name:  Javed Cote  YOB: 1955  Admitting Diagnosis: Acute respiratory failure with hypoxia ([address] [J96.01]  Multifocal pneumonia [J18.9]  Acute respiratory failure with hypoxia ([address] [J96.01]  Multifocal pneumonia [J18.9]  Multifocal pneumonia [J18.9]     Admit Date/Time:  7/10/2019  8:50 AM    Chart reviewed. Interdisciplinary team met to discuss patient progress and discharge plans. Disciplines included Case Management, Nursing, and Dietitian. Current Status:stable    PT/OT recommendation:NA    Anticipated Discharge Date: tbd  Expected D/C Disposition:  Home  Confirmed plan with patient and/or family Yes-pt  Discharge Plan Comments: Chart reviewed and role of dcp explained. Pt is from house with spouse and continues with plan to return with [facility] with [facility] for home O2. +eCOC      Home O2 in place on admit: Yes  Pt informed of need to bring portable home O2 tank on day of discharge for nursing to connect prior to leaving:  Yes  Verbalized agreement/Understanding:   Yes
[facility]  Received referral regarding [facility]. services from [address]. Pt is active with [facility] and services are currently on HOLD due to admit. Liaison will continue to follow for [facility] needs until discharge.     Electronically signed by Jamir Bear RN on 7/11/2019 at 11:56 AM
home?:  No  Social Support  Do you have a ?:  No  Do you have a [facility]?:  Yes  [facility] Name:  [address]  S:  [phone]  Patient DME:  Salma Saxena  Patient Home Equipment:  Oxygen, Nebulizer  Functional Review  Ability to seek help/take action for Emergent/Urgent situations i.e. fire, crime, inclement weather or health crisis. :  Independent  Ability handle personal hygiene needs (bathing/dressing/grooming): Independent  Ability to manage medications: Independent  Ability to prepare food:  Needs Assistance  Ability to maintain home (clean home, laundry):  Needs Assistance  Ability to drive and/or has transportation:  Needs Assistance  Ability to do shopping:  Needs Assistance  Ability to manage finances:  Needs Assistance  Hearing and Vision  Visual Impairment:  Visual impairment (Glasses/contacts)  Hearing Impairment:  None  Care Transitions Interventions       3rd readmission in 3 weeks. From home with spouse. Most recent discharge to home with referral to [facility] and new walker. [facility] was able to start services, but PT/OT eval have not been out yet. Care transition was unable to reach her x 3 attempts. Met with Renu Parada today sitting up in bed eating lunch. Reports she feels much better. She is SOB during conversation with oxygen on. States her voice mail was not working and she did not receive messages from [employer]. Provided her with CTN contact information and confirmed number. Has scale, BP cuff, pulse oximeter at home. Agreeable to [facility] resuming services at discharge. States she is transferring out of the ICU today. Sees pulm next week.      Follow Up  Future Appointments   Date Time Provider Calvin Mcmullen   7/17/2019  8:15 AM MD Analilia Hassan       Health Maintenance  Health Maintenance Due   Topic Date Due    Breast cancer screen  06/15/2017    DEXA (modify frequency per FRAX score)  02/06/2018       Valeria Rutherford RN

## 2019-07-12 NOTE — PROGRESS NOTES
malignant cells identified.       -  GMS stain negative for fungal and Pneumocystis jirovecii       organisms.       B. Lung, Bronchial Washings:       -  No malignant cells identified.       -  GMS stain negative for fungal and Pneumocystis jirovecii  Eosinophils 2%    ASSESSMENT:  · Acute hypoxemic respiratory failure  · Abnormal CT chest/chest x-ray- DDx viral/atypical infection, , DAH, HP, vasculitis, NSIP, eosinophilic pneumonitis. Nondiagnostic transbronchial biopsy 6/27/2019  · Fever  · Atrial fibrillation  · Grade 2 diastolic dysfunction       PLAN:  · Bilevel non-invasive positive pressure ventilation per my orders- BiPAP PRN   · Supplemental oxygen to maintain SaO2 >92%; wean as tolerated  · Vapotherm if needed  · Vancomycin and meropenem D#3-DC vancomycin  · IV Solu-Medrol 40 every 12  · Inhaled bronchodilators  · Vasculitis work-up negative so far  · Hypersensitivity panel pending  · Chest x-ray in a.m.   · Might require open lung biopsy  · Home meds were addressed   · Lasix 40 mg IV daily

## 2019-07-13 ENCOUNTER — APPOINTMENT (OUTPATIENT)
Dept: GENERAL RADIOLOGY | Age: 64
DRG: 853 | End: 2019-07-13
Payer: COMMERCIAL

## 2019-07-13 LAB
ANION GAP SERPL CALCULATED.3IONS-SCNC: 12 MMOL/L (ref 3–16)
BASOPHILS ABSOLUTE: 0 K/UL (ref 0–0.2)
BASOPHILS RELATIVE PERCENT: 0.2 %
BETA GLOBULIN: 0 AU/ML (ref 0–19)
BUN BLDV-MCNC: 11 MG/DL (ref 7–20)
CALCIUM SERPL-MCNC: 8.6 MG/DL (ref 8.3–10.6)
CHLORIDE BLD-SCNC: 98 MMOL/L (ref 99–110)
CO2: 26 MMOL/L (ref 21–32)
CREAT SERPL-MCNC: <0.5 MG/DL (ref 0.6–1.2)
EOSINOPHILS ABSOLUTE: 0 K/UL (ref 0–0.6)
EOSINOPHILS RELATIVE PERCENT: 0.3 %
GFR AFRICAN AMERICAN: >60
GFR NON-AFRICAN AMERICAN: >60
GLUCOSE BLD-MCNC: 228 MG/DL (ref 70–99)
HCT VFR BLD CALC: 32 % (ref 36–48)
HEMOGLOBIN: 10.3 G/DL (ref 12–16)
LYMPHOCYTES ABSOLUTE: 1.8 K/UL (ref 1–5.1)
LYMPHOCYTES RELATIVE PERCENT: 13.2 %
MCH RBC QN AUTO: 27.3 PG (ref 26–34)
MCHC RBC AUTO-ENTMCNC: 32.3 G/DL (ref 31–36)
MCV RBC AUTO: 84.4 FL (ref 80–100)
MONOCYTES ABSOLUTE: 0.8 K/UL (ref 0–1.3)
MONOCYTES RELATIVE PERCENT: 6.1 %
NEUTROPHILS ABSOLUTE: 11 K/UL (ref 1.7–7.7)
NEUTROPHILS RELATIVE PERCENT: 80.2 %
ORGANISM: ABNORMAL
PDW BLD-RTO: 16.8 % (ref 12.4–15.4)
PHOSPHORUS: 2.3 MG/DL (ref 2.5–4.9)
PLATELET # BLD: 183 K/UL (ref 135–450)
PMV BLD AUTO: 7.3 FL (ref 5–10.5)
POTASSIUM REFLEX MAGNESIUM: 3.8 MMOL/L (ref 3.5–5.1)
RBC # BLD: 3.79 M/UL (ref 4–5.2)
SODIUM BLD-SCNC: 136 MMOL/L (ref 136–145)
URINE CULTURE, ROUTINE: ABNORMAL
URINE CULTURE, ROUTINE: ABNORMAL
WBC # BLD: 13.7 K/UL (ref 4–11)

## 2019-07-13 PROCEDURE — 94669 MECHANICAL CHEST WALL OSCILL: CPT

## 2019-07-13 PROCEDURE — 71046 X-RAY EXAM CHEST 2 VIEWS: CPT

## 2019-07-13 PROCEDURE — 99232 SBSQ HOSP IP/OBS MODERATE 35: CPT | Performed by: INTERNAL MEDICINE

## 2019-07-13 PROCEDURE — 6370000000 HC RX 637 (ALT 250 FOR IP): Performed by: PHYSICIAN ASSISTANT

## 2019-07-13 PROCEDURE — 94640 AIRWAY INHALATION TREATMENT: CPT

## 2019-07-13 PROCEDURE — 6360000002 HC RX W HCPCS: Performed by: INTERNAL MEDICINE

## 2019-07-13 PROCEDURE — 6360000002 HC RX W HCPCS: Performed by: PHYSICIAN ASSISTANT

## 2019-07-13 PROCEDURE — 84100 ASSAY OF PHOSPHORUS: CPT

## 2019-07-13 PROCEDURE — 2580000003 HC RX 258: Performed by: PHYSICIAN ASSISTANT

## 2019-07-13 PROCEDURE — 80048 BASIC METABOLIC PNL TOTAL CA: CPT

## 2019-07-13 PROCEDURE — 36415 COLL VENOUS BLD VENIPUNCTURE: CPT

## 2019-07-13 PROCEDURE — 2700000000 HC OXYGEN THERAPY PER DAY

## 2019-07-13 PROCEDURE — 6370000000 HC RX 637 (ALT 250 FOR IP): Performed by: INTERNAL MEDICINE

## 2019-07-13 PROCEDURE — 85025 COMPLETE CBC W/AUTO DIFF WBC: CPT

## 2019-07-13 PROCEDURE — 2060000000 HC ICU INTERMEDIATE R&B

## 2019-07-13 PROCEDURE — 94761 N-INVAS EAR/PLS OXIMETRY MLT: CPT

## 2019-07-13 RX ORDER — PREDNISONE 20 MG/1
40 TABLET ORAL DAILY
Status: DISCONTINUED | OUTPATIENT
Start: 2019-07-14 | End: 2019-07-14 | Stop reason: HOSPADM

## 2019-07-13 RX ADMIN — POTASSIUM CHLORIDE 10 MEQ: 10 TABLET, EXTENDED RELEASE ORAL at 09:04

## 2019-07-13 RX ADMIN — IPRATROPIUM BROMIDE AND ALBUTEROL SULFATE 1 AMPULE: .5; 3 SOLUTION RESPIRATORY (INHALATION) at 15:14

## 2019-07-13 RX ADMIN — AMITRIPTYLINE HYDROCHLORIDE 25 MG: 25 TABLET, FILM COATED ORAL at 20:21

## 2019-07-13 RX ADMIN — TRAZODONE HYDROCHLORIDE 50 MG: 50 TABLET ORAL at 20:21

## 2019-07-13 RX ADMIN — MUPIROCIN: 20 OINTMENT TOPICAL at 09:04

## 2019-07-13 RX ADMIN — NADOLOL 40 MG: 40 TABLET ORAL at 20:13

## 2019-07-13 RX ADMIN — IPRATROPIUM BROMIDE AND ALBUTEROL SULFATE 1 AMPULE: .5; 3 SOLUTION RESPIRATORY (INHALATION) at 19:22

## 2019-07-13 RX ADMIN — MEROPENEM 1 G: 1 INJECTION, POWDER, FOR SOLUTION INTRAVENOUS at 11:46

## 2019-07-13 RX ADMIN — MIRTAZAPINE 30 MG: 15 TABLET, FILM COATED ORAL at 20:21

## 2019-07-13 RX ADMIN — ASPIRIN 81 MG 81 MG: 81 TABLET ORAL at 09:04

## 2019-07-13 RX ADMIN — MUPIROCIN: 20 OINTMENT TOPICAL at 20:13

## 2019-07-13 RX ADMIN — IPRATROPIUM BROMIDE AND ALBUTEROL SULFATE 1 AMPULE: .5; 3 SOLUTION RESPIRATORY (INHALATION) at 11:03

## 2019-07-13 RX ADMIN — METHYLPREDNISOLONE SODIUM SUCCINATE 40 MG: 40 INJECTION, POWDER, FOR SOLUTION INTRAMUSCULAR; INTRAVENOUS at 03:28

## 2019-07-13 RX ADMIN — Medication 10 ML: at 09:07

## 2019-07-13 RX ADMIN — LEVOTHYROXINE SODIUM 150 MCG: 150 TABLET ORAL at 09:04

## 2019-07-13 RX ADMIN — ENOXAPARIN SODIUM 40 MG: 40 INJECTION SUBCUTANEOUS at 09:04

## 2019-07-13 RX ADMIN — FUROSEMIDE 40 MG: 40 TABLET ORAL at 09:04

## 2019-07-13 RX ADMIN — FLUTICASONE PROPIONATE 1 SPRAY: 50 SPRAY, METERED NASAL at 09:06

## 2019-07-13 RX ADMIN — IPRATROPIUM BROMIDE AND ALBUTEROL SULFATE 1 AMPULE: .5; 3 SOLUTION RESPIRATORY (INHALATION) at 07:00

## 2019-07-13 RX ADMIN — SERTRALINE 200 MG: 100 TABLET, FILM COATED ORAL at 09:04

## 2019-07-13 RX ADMIN — ACETAMINOPHEN 650 MG: 325 TABLET ORAL at 14:26

## 2019-07-13 RX ADMIN — MEROPENEM 1 G: 1 INJECTION, POWDER, FOR SOLUTION INTRAVENOUS at 20:13

## 2019-07-13 RX ADMIN — MEROPENEM 1 G: 1 INJECTION, POWDER, FOR SOLUTION INTRAVENOUS at 03:28

## 2019-07-13 ASSESSMENT — PAIN - FUNCTIONAL ASSESSMENT: PAIN_FUNCTIONAL_ASSESSMENT: ACTIVITIES ARE NOT PREVENTED

## 2019-07-13 ASSESSMENT — PAIN DESCRIPTION - ONSET: ONSET: GRADUAL

## 2019-07-13 ASSESSMENT — PAIN DESCRIPTION - DESCRIPTORS: DESCRIPTORS: ACHING;HEADACHE

## 2019-07-13 ASSESSMENT — PAIN DESCRIPTION - FREQUENCY: FREQUENCY: CONTINUOUS

## 2019-07-13 ASSESSMENT — PAIN DESCRIPTION - PROGRESSION: CLINICAL_PROGRESSION: GRADUALLY WORSENING

## 2019-07-13 ASSESSMENT — PAIN DESCRIPTION - ORIENTATION: ORIENTATION: RIGHT;LEFT;MID

## 2019-07-13 ASSESSMENT — PAIN SCALES - GENERAL
PAINLEVEL_OUTOF10: 1
PAINLEVEL_OUTOF10: 3

## 2019-07-13 ASSESSMENT — PAIN DESCRIPTION - PAIN TYPE: TYPE: ACUTE PAIN

## 2019-07-13 ASSESSMENT — PAIN DESCRIPTION - LOCATION: LOCATION: HEAD

## 2019-07-13 NOTE — PROGRESS NOTES
cystitis without hematuria  Resolved Problems:    * No resolved hospital problems. *      Plan:  Acute on Chronic hypoxic respiratory failure   - Requires 2L NC O2 at home but was hypoxic in to the 70's on home O, Required NRB in ED . Admitted to ICU . Now on2-3L NC O2. Hypoxic with activity    - 2/2 recurrent PNA   - Continue supplemental O2 and wean as tolerated   - s/P bronch . - sats better, transferred out Of ICU to PCU    Sepsis ( POA - febrile, leukocytosis, tachynpnea,  Hypoxia, source)   - 2/2 PNA   - Cx pending   - Continue IV Abx   - no IVF due to CHF     Recurrent Pneumonia   - Patient has had multiple admissions for PNA despite multiple rounds of Abx   - She has completed Merrem and Vanc during her last admission   - Returns with worsening PNA on CXR, concerns for gram negative organism   - Merrem/Vanc D#3, can stop Vanc   - Pulm consult: s/p bronch 7/10, initiated workup for vasculitis (negative so far), may require open lung bx per pulm  - IBD, IV Steroids   - Follow up on Cx--BAL fungus Cx: candida rare growth,  Resp Cx with yeast    - Repeat CXR in the morning    Possible UTI   - No significant sx   - UA +LE and WBC   - Urine Cx- GNR   - Already on IV Abx, monitor for Cx      Chronic dCHF   - Was started on IV Lasix-->will switch to PO Lasix 40mg (home dose is 20)   - Had some swelling in B ankles, now resolved  - net deficit 6.7L      Atrial fibrillation   - Dx during a prior admission, converted back to NSR quickly and was not started on AC  - Rate is now controlled   - Continue home BB     Hypothyroidism   - Continue home synthroid      Obesity  - Body mass index is 35.48 kg/m². - Complicating assessment and treatment. Placing patient at risk for multiple co-morbidities as well as early death and contributing to the patient's presentation.   - Counseled on weight loss.     DVT Prophylaxis: Lovenox  DIET GENERAL;  Code Status: Full Code        Ambulate, increase activity as tolerated.   Wean O2

## 2019-07-13 NOTE — PROGRESS NOTES
Bedside report given to [facility]. Pt. denies further needs at this time. Call light is within reach.   Carlin Ambrocio RN

## 2019-07-14 VITALS
SYSTOLIC BLOOD PRESSURE: 125 MMHG | WEIGHT: 207.3 LBS | TEMPERATURE: 97.9 F | RESPIRATION RATE: 18 BRPM | OXYGEN SATURATION: 94 % | HEART RATE: 85 BPM | DIASTOLIC BLOOD PRESSURE: 74 MMHG | BODY MASS INDEX: 35.39 KG/M2 | HEIGHT: 64 IN

## 2019-07-14 PROBLEM — A41.9 SEPSIS (HCC): Status: RESOLVED | Noted: 2019-06-16 | Resolved: 2019-07-14

## 2019-07-14 LAB
ANION GAP SERPL CALCULATED.3IONS-SCNC: 14 MMOL/L (ref 3–16)
BASOPHILS ABSOLUTE: 0.1 K/UL (ref 0–0.2)
BASOPHILS RELATIVE PERCENT: 0.4 %
BUN BLDV-MCNC: 15 MG/DL (ref 7–20)
CALCIUM SERPL-MCNC: 8.4 MG/DL (ref 8.3–10.6)
CHLORIDE BLD-SCNC: 98 MMOL/L (ref 99–110)
CO2: 25 MMOL/L (ref 21–32)
CREAT SERPL-MCNC: <0.5 MG/DL (ref 0.6–1.2)
EOSINOPHILS ABSOLUTE: 0.5 K/UL (ref 0–0.6)
EOSINOPHILS RELATIVE PERCENT: 2.9 %
GFR AFRICAN AMERICAN: >60
GFR NON-AFRICAN AMERICAN: >60
GLUCOSE BLD-MCNC: 175 MG/DL (ref 70–99)
HCT VFR BLD CALC: 34.8 % (ref 36–48)
HEMOGLOBIN: 11.1 G/DL (ref 12–16)
LYMPHOCYTES ABSOLUTE: 5.1 K/UL (ref 1–5.1)
LYMPHOCYTES RELATIVE PERCENT: 31.4 %
MAGNESIUM: 2 MG/DL (ref 1.8–2.4)
MCH RBC QN AUTO: 27.2 PG (ref 26–34)
MCHC RBC AUTO-ENTMCNC: 32.1 G/DL (ref 31–36)
MCV RBC AUTO: 84.9 FL (ref 80–100)
MONOCYTES ABSOLUTE: 0.7 K/UL (ref 0–1.3)
MONOCYTES RELATIVE PERCENT: 4.3 %
NEUTROPHILS ABSOLUTE: 9.8 K/UL (ref 1.7–7.7)
NEUTROPHILS RELATIVE PERCENT: 61 %
PDW BLD-RTO: 16.7 % (ref 12.4–15.4)
PHOSPHORUS: 2.9 MG/DL (ref 2.5–4.9)
PLATELET # BLD: 214 K/UL (ref 135–450)
PMV BLD AUTO: 7.3 FL (ref 5–10.5)
POTASSIUM REFLEX MAGNESIUM: 3.4 MMOL/L (ref 3.5–5.1)
RBC # BLD: 4.09 M/UL (ref 4–5.2)
SODIUM BLD-SCNC: 137 MMOL/L (ref 136–145)
WBC # BLD: 16.1 K/UL (ref 4–11)

## 2019-07-14 PROCEDURE — 6370000000 HC RX 637 (ALT 250 FOR IP): Performed by: PHYSICIAN ASSISTANT

## 2019-07-14 PROCEDURE — 83735 ASSAY OF MAGNESIUM: CPT

## 2019-07-14 PROCEDURE — 94640 AIRWAY INHALATION TREATMENT: CPT

## 2019-07-14 PROCEDURE — 6360000002 HC RX W HCPCS: Performed by: PHYSICIAN ASSISTANT

## 2019-07-14 PROCEDURE — 2580000003 HC RX 258: Performed by: PHYSICIAN ASSISTANT

## 2019-07-14 PROCEDURE — 36415 COLL VENOUS BLD VENIPUNCTURE: CPT

## 2019-07-14 PROCEDURE — 6370000000 HC RX 637 (ALT 250 FOR IP): Performed by: INTERNAL MEDICINE

## 2019-07-14 PROCEDURE — 80048 BASIC METABOLIC PNL TOTAL CA: CPT

## 2019-07-14 PROCEDURE — 85025 COMPLETE CBC W/AUTO DIFF WBC: CPT

## 2019-07-14 PROCEDURE — 99232 SBSQ HOSP IP/OBS MODERATE 35: CPT | Performed by: INTERNAL MEDICINE

## 2019-07-14 PROCEDURE — 84100 ASSAY OF PHOSPHORUS: CPT

## 2019-07-14 PROCEDURE — 99238 HOSP IP/OBS DSCHRG MGMT 30/<: CPT | Performed by: INTERNAL MEDICINE

## 2019-07-14 PROCEDURE — 94669 MECHANICAL CHEST WALL OSCILL: CPT

## 2019-07-14 RX ORDER — POTASSIUM CHLORIDE 20 MEQ/1
20 TABLET, EXTENDED RELEASE ORAL DAILY
Qty: 30 TABLET | Refills: 1 | Status: SHIPPED | OUTPATIENT
Start: 2019-07-15 | End: 2019-09-19 | Stop reason: SDUPTHER

## 2019-07-14 RX ORDER — POTASSIUM CHLORIDE 20 MEQ/1
20 TABLET, EXTENDED RELEASE ORAL DAILY
Status: DISCONTINUED | OUTPATIENT
Start: 2019-07-14 | End: 2019-07-14 | Stop reason: HOSPADM

## 2019-07-14 RX ORDER — FUROSEMIDE 40 MG/1
40 TABLET ORAL DAILY
Qty: 30 TABLET | Refills: 1 | Status: SHIPPED | OUTPATIENT
Start: 2019-07-15 | End: 2019-09-19 | Stop reason: SDUPTHER

## 2019-07-14 RX ORDER — LEVOFLOXACIN 500 MG/1
500 TABLET, FILM COATED ORAL DAILY
Qty: 3 TABLET | Refills: 0 | Status: SHIPPED | OUTPATIENT
Start: 2019-07-14 | End: 2019-07-17

## 2019-07-14 RX ADMIN — IPRATROPIUM BROMIDE AND ALBUTEROL SULFATE 1 AMPULE: .5; 3 SOLUTION RESPIRATORY (INHALATION) at 10:49

## 2019-07-14 RX ADMIN — IPRATROPIUM BROMIDE AND ALBUTEROL SULFATE 1 AMPULE: .5; 3 SOLUTION RESPIRATORY (INHALATION) at 07:14

## 2019-07-14 RX ADMIN — ACETAMINOPHEN 650 MG: 325 TABLET ORAL at 03:27

## 2019-07-14 RX ADMIN — FUROSEMIDE 40 MG: 40 TABLET ORAL at 08:49

## 2019-07-14 RX ADMIN — LEVOTHYROXINE SODIUM 150 MCG: 150 TABLET ORAL at 08:49

## 2019-07-14 RX ADMIN — POTASSIUM CHLORIDE 10 MEQ: 10 TABLET, EXTENDED RELEASE ORAL at 08:49

## 2019-07-14 RX ADMIN — ENOXAPARIN SODIUM 40 MG: 40 INJECTION SUBCUTANEOUS at 08:49

## 2019-07-14 RX ADMIN — MEROPENEM 1 G: 1 INJECTION, POWDER, FOR SOLUTION INTRAVENOUS at 03:27

## 2019-07-14 RX ADMIN — FLUTICASONE PROPIONATE 1 SPRAY: 50 SPRAY, METERED NASAL at 08:51

## 2019-07-14 RX ADMIN — MEROPENEM 1 G: 1 INJECTION, POWDER, FOR SOLUTION INTRAVENOUS at 12:45

## 2019-07-14 RX ADMIN — PREDNISONE 40 MG: 20 TABLET ORAL at 08:49

## 2019-07-14 RX ADMIN — ASPIRIN 81 MG 81 MG: 81 TABLET ORAL at 08:49

## 2019-07-14 RX ADMIN — SERTRALINE 200 MG: 100 TABLET, FILM COATED ORAL at 08:49

## 2019-07-14 RX ADMIN — MUPIROCIN: 20 OINTMENT TOPICAL at 08:51

## 2019-07-14 RX ADMIN — Medication 10 ML: at 08:49

## 2019-07-14 RX ADMIN — ACETAMINOPHEN 650 MG: 325 TABLET ORAL at 08:48

## 2019-07-14 RX ADMIN — POTASSIUM CHLORIDE 20 MEQ: 20 TABLET, EXTENDED RELEASE ORAL at 13:05

## 2019-07-14 ASSESSMENT — PAIN DESCRIPTION - LOCATION
LOCATION: HEAD
LOCATION: HEAD

## 2019-07-14 ASSESSMENT — PAIN DESCRIPTION - PROGRESSION
CLINICAL_PROGRESSION: GRADUALLY WORSENING
CLINICAL_PROGRESSION: GRADUALLY WORSENING

## 2019-07-14 ASSESSMENT — PAIN DESCRIPTION - ORIENTATION
ORIENTATION: MID
ORIENTATION: RIGHT;LEFT;MID

## 2019-07-14 ASSESSMENT — PAIN DESCRIPTION - FREQUENCY
FREQUENCY: CONTINUOUS
FREQUENCY: CONTINUOUS

## 2019-07-14 ASSESSMENT — PAIN SCALES - GENERAL
PAINLEVEL_OUTOF10: 0
PAINLEVEL_OUTOF10: 0
PAINLEVEL_OUTOF10: 4
PAINLEVEL_OUTOF10: 3

## 2019-07-14 ASSESSMENT — PAIN DESCRIPTION - ONSET
ONSET: ON-GOING
ONSET: AWAKENED FROM SLEEP

## 2019-07-14 ASSESSMENT — PAIN DESCRIPTION - DESCRIPTORS
DESCRIPTORS: ACHING;HEADACHE
DESCRIPTORS: HEADACHE

## 2019-07-14 ASSESSMENT — PAIN DESCRIPTION - PAIN TYPE
TYPE: ACUTE PAIN
TYPE: ACUTE PAIN

## 2019-07-14 NOTE — PROGRESS NOTES
Pt. In bed awake. No signs of distress noted. Pt. Assessment completed- see flow sheet. Pt. C/o headache. PRN Tylenol given per [facility] order. Pt. denies further needs at this time. Will continue to monitor. Call light is within reach.   Nathanael Mello RN

## 2019-07-14 NOTE — PROGRESS NOTES
day    enoxaparin  40 mg Subcutaneous Daily    meropenem (MERREM) 1 g IVPB (mini-bag)  1 g Intravenous Q8H     PRN Meds:  hydrOXYzine, LORazepam, sodium chloride flush, magnesium hydroxide, ondansetron, potassium chloride, magnesium sulfate, albuterol, acetaminophen    Results:  CBC:   Recent Labs     07/12/19  0530 07/13/19  0438 07/14/19  0423   WBC 12.9* 13.7* 16.1*   HGB 10.5* 10.3* 11.1*   HCT 32.7* 32.0* 34.8*   MCV 84.9 84.4 84.9    183 214     BMP:   Recent Labs     07/12/19  0530 07/13/19  0437 07/14/19  0423    136 137   K 4.0 3.8 3.4*    98* 98*   CO2 26 26 25   PHOS 3.3 2.3* 2.9   BUN 10 11 15   CREATININE <0.5* <0.5* <0.5*     LIVER PROFILE:   No results for input(s): AST, ALT, LIPASE, BILIDIR, BILITOT, ALKPHOS in the last 72 hours. Invalid input(s): AMYLASE,  ALB    Cultures:  BAL 7/10 no growth to date  BCx 7/10 no growth to date  UCx 7/10 Klebsiella oxytoca    Films:  Chest x-ray 7/13/2019 imaging was reviewed by me and showed   Improving bilateral airspace disease     Bronchoscopy 6/28/2019   left upper lobe, transbronchial biopsies:  - Fragments of benign bronchial wall. - Negative for granulomas or other significant inflammation.  - Negative for malignancy. A. Right Paratracheal Lymph Node, Transbronchial Fine Needle Aspirate:       -  No malignant cells identified. B. Subcarinal Lymph Node, Transbronchial Fine Needle Aspirate:       -  No malignant cells identified. C. Lung, Left Upper Lobe, Bronchoalveolar Lavage:       -  No malignant cells identified. D. Lung, Bronchial Washings:       -  No malignant cells identified.     Subcarinal TB NA no phenotypically abnormal cells  AFB negative  Eosinophils 6%     CRP 56.9  AXEL negative  IgG 1310  HIV negative  Procalcitonin 0.11  Rheumatoid factor normal  CK normal  ds negativeDNA     Bronchoscopy 7/10/2019     A.  Lung, Lingula, Bronchoalveolar Lavage:       -  No malignant cells identified.       -  GMS stain negative for fungal and Pneumocystis jirovecii     organisms.       B. Lung, Bronchial Washings:       -  No malignant cells identified.       -  GMS stain negative for fungal and Pneumocystis jirovecii    Eosinophils 2%      ASSESSMENT:  · Acute hypoxemic respiratory failure  · Abnormal CT chest/chest x-ray- DDx viral/atypical infection, , DAH, HP, vasculitis, NSIP, eosinophilic pneumonitis. Nondiagnostic transbronchial biopsy 6/27/2019  · Fever-resolved  · Atrial fibrillation  · Grade 2 diastolic dysfunction       PLAN:  · Supplemental oxygen to maintain SaO2 >92%; wean as tolerated  · Meropenem D#5. Completed 3 days of vancomycin.   Could be discharged on Levaquin for 3 to 4 days  · Prednisone 40 mg daily until she sees Dr. Tomeka Jacobson in 1 week (Office is  notified)   · Inhaled bronchodilators  · Vasculitis work-up negative so far  · Hypersensitivity panel pending  · Diuresis per internal medicine

## 2019-07-15 ENCOUNTER — TELEPHONE (OUTPATIENT)
Dept: PULMONOLOGY | Age: 64
End: 2019-07-15

## 2019-07-15 ENCOUNTER — CARE COORDINATION (OUTPATIENT)
Dept: CASE MANAGEMENT | Age: 64
End: 2019-07-15

## 2019-07-15 LAB
ALLERGEN BEEF: <0.1 KU/L
ALLERGEN PHOMA BETAE: <0.1 KU/L
ALLERGEN PORK: <0.1 KU/L
ALLERGEN SEE NOTE: NORMAL
ALLERGEN, FEATHER MIX: NEGATIVE KU/L
ASPERGILLUS FLAVUS ANTIBODIES: NORMAL
ASPERGILLUS FUMIGATUS #1: NORMAL
ASPERGILLUS FUMIGATUS #2: NORMAL
ASPERGILLUS FUMIGATUS #3: NORMAL
ASPERGILLUS FUMIGATUS #6: NORMAL
AUREOBASIDIUM PULLULANS: NORMAL
BLOOD CULTURE, ROUTINE: NORMAL
CULTURE, BLOOD 2: NORMAL
MICROPOLYSPORA FAENI: NORMAL
PIGEON SERUM ABS: NORMAL
SACCHAROMONOSPORA VIRIDIS: NORMAL
THERMOACTINOMYCES CANDIDUS AB: NORMAL
THERMOACTINOMYCES VULGARIS #1: NORMAL

## 2019-07-16 ENCOUNTER — TELEPHONE (OUTPATIENT)
Dept: FAMILY MEDICINE CLINIC | Age: 64
End: 2019-07-16

## 2019-07-16 LAB — CRYOGLOBULIN, QUALITATIVE: NORMAL

## 2019-07-16 NOTE — TELEPHONE ENCOUNTER
[facility] pt was discharged from hospital and asking for verbal order to resume home care including SN, PT, OT, and social work

## 2019-07-17 ENCOUNTER — OFFICE VISIT (OUTPATIENT)
Dept: PULMONOLOGY | Age: 64
End: 2019-07-17
Payer: COMMERCIAL

## 2019-07-17 VITALS
HEART RATE: 80 BPM | WEIGHT: 190 LBS | OXYGEN SATURATION: 92 % | BODY MASS INDEX: 33.66 KG/M2 | SYSTOLIC BLOOD PRESSURE: 122 MMHG | DIASTOLIC BLOOD PRESSURE: 70 MMHG | RESPIRATION RATE: 20 BRPM | HEIGHT: 63 IN

## 2019-07-17 DIAGNOSIS — J84.10 PULMONARY FIBROSIS (HCC): ICD-10-CM

## 2019-07-17 DIAGNOSIS — R93.89 ABNORMAL CT OF THE CHEST: Primary | ICD-10-CM

## 2019-07-17 DIAGNOSIS — J98.4 RESTRICTIVE LUNG DISEASE: ICD-10-CM

## 2019-07-17 DIAGNOSIS — D84.9 IMMUNOSUPPRESSION (HCC): ICD-10-CM

## 2019-07-17 PROCEDURE — 1111F DSCHRG MED/CURRENT MED MERGE: CPT | Performed by: INTERNAL MEDICINE

## 2019-07-17 PROCEDURE — 1036F TOBACCO NON-USER: CPT | Performed by: INTERNAL MEDICINE

## 2019-07-17 PROCEDURE — 4040F PNEUMOC VAC/ADMIN/RCVD: CPT | Performed by: INTERNAL MEDICINE

## 2019-07-17 PROCEDURE — G8400 PT W/DXA NO RESULTS DOC: HCPCS | Performed by: INTERNAL MEDICINE

## 2019-07-17 PROCEDURE — 1123F ACP DISCUSS/DSCN MKR DOCD: CPT | Performed by: INTERNAL MEDICINE

## 2019-07-17 PROCEDURE — 3017F COLORECTAL CA SCREEN DOC REV: CPT | Performed by: INTERNAL MEDICINE

## 2019-07-17 PROCEDURE — 99215 OFFICE O/P EST HI 40 MIN: CPT | Performed by: INTERNAL MEDICINE

## 2019-07-17 PROCEDURE — 1090F PRES/ABSN URINE INCON ASSESS: CPT | Performed by: INTERNAL MEDICINE

## 2019-07-17 PROCEDURE — G8417 CALC BMI ABV UP PARAM F/U: HCPCS | Performed by: INTERNAL MEDICINE

## 2019-07-17 PROCEDURE — G8427 DOCREV CUR MEDS BY ELIG CLIN: HCPCS | Performed by: INTERNAL MEDICINE

## 2019-07-17 RX ORDER — PREDNISONE 10 MG/1
TABLET ORAL
Qty: 112 TABLET | Refills: 0 | Status: ON HOLD | OUTPATIENT
Start: 2019-07-17 | End: 2019-08-10 | Stop reason: HOSPADM

## 2019-07-17 RX ORDER — ALBUTEROL SULFATE 90 UG/1
2 AEROSOL, METERED RESPIRATORY (INHALATION) EVERY 6 HOURS PRN
Qty: 1 INHALER | Refills: 5 | Status: SHIPPED | OUTPATIENT
Start: 2019-07-17 | End: 2020-05-22 | Stop reason: SDUPTHER

## 2019-07-17 RX ORDER — ALBUTEROL SULFATE 2.5 MG/3ML
2.5 SOLUTION RESPIRATORY (INHALATION) EVERY 6 HOURS PRN
Qty: 125 EACH | Refills: 5 | Status: SHIPPED | OUTPATIENT
Start: 2019-07-17 | End: 2020-01-22

## 2019-07-17 RX ORDER — SULFAMETHOXAZOLE AND TRIMETHOPRIM 800; 160 MG/1; MG/1
TABLET ORAL
Qty: 12 TABLET | Refills: 1 | Status: SHIPPED | OUTPATIENT
Start: 2019-07-17 | End: 2019-08-15 | Stop reason: SDUPTHER

## 2019-07-18 ENCOUNTER — CARE COORDINATION (OUTPATIENT)
Dept: CASE MANAGEMENT | Age: 64
End: 2019-07-18

## 2019-07-18 NOTE — CARE COORDINATION
[facility] Transitions Follow Up Call    2019    Patient: Radhames Smith  Patient : 1955   MRN: [id number]  Reason for Admission: respiratory failure  Discharge Date: 19 RARS: Readmission Risk Score: 30    At first attempt, rang once and was hung up on. On 2nd attempt, rang to voice mail. Half way through message system disconnected. Unable to leave complete message at this time.         Follow Up  Future Appointments   Date Time Provider Calvin Mcmullen   2019 12:00 PM MMO CT RM 1 [facility] [place]   2019  1:15 PM [facility]       Cal Bradford RN

## 2019-07-19 LAB
FINAL REPORT: NORMAL
FINAL REPORT: NORMAL
PRELIMINARY: NORMAL
PRELIMINARY: NORMAL

## 2019-07-20 NOTE — DISCHARGE SUMMARY
16.4* 16.8* 16.8*    200 183      BMP:         Recent Labs     07/11/19  0454 07/12/19  0530 07/13/19  0437     NA* 140 136   K 4.2  NA*  NA* 4.0 3.8     NA* 101 98*   CO2 24  NA* 26 26   PHOS 3.2  NA* 3.3 2.3*   BUN 9  NA* 10 11   CREATININE <0.5*  NA* <0.5* <0.5*       CULTURES  Legionella: Negative   Fungus Cx: Candida albicans, rare growth   Blood Cx: no growth   Viral Resp Cx:  Negative   Urine Cx: Klebsiella oxytoca   Fungus Cx: Candida albicans, rare growth   Resp Cx: NRF  Acid Fast: no AFB    Klebsiella oxytoca (1)     Antibiotic Interpretation CHARLA Status    amoxicillin-clavulanate Sensitive <=4/2 mcg/mL     ampicillin Resistant >16 mcg/mL     ceFAZolin Resistant 8 mcg/mL     cefepime Sensitive <=1 mcg/mL     cefTRIAXone Sensitive <=1 mcg/mL     ciprofloxacin Sensitive <=0.5 mcg/mL     gentamicin Sensitive <=2 mcg/mL     levofloxacin Sensitive <=1 mcg/mL     meropenem Sensitive <=0.25 mcg/mL     nitrofurantoin Intermediate 64 mcg/mL     piperacillin-tazobactam Sensitive 4/4 mcg/mL     tetracycline Sensitive <=2 mcg/mL     trimethoprim-sulfamethoxazole Sensitive <=0.5/9.5 mcg/mL         RADIOLOGY  XR CHEST STANDARD (2 VW)   Final Result   1. Mild improvement with residual bilateral airspace disease. XR CHEST PORTABLE   Final Result   Increased severity of diffuse bilateral airspace disease over the past couple   weeks.              Discharge Medications     Medication List      START taking these medications    potassium chloride 20 MEQ extended release tablet  Commonly known as:  KLOR-CON M  Take 1 tablet by mouth daily        CHANGE how you take these medications    furosemide 40 MG tablet  Commonly known as:  LASIX  Take 1 tablet by mouth daily  What changed:    · medication strength  · how much to take     nadolol 20 MG tablet  Commonly known as:  CORGARD  TAKE 2 TABLETS BY MOUTH EVERY DAY  What changed:  when to take this     OXYGEN  What changed:  how much to take Arslan Christensen MD  7/14/19

## 2019-07-22 ENCOUNTER — TELEPHONE (OUTPATIENT)
Dept: PULMONOLOGY | Age: 64
End: 2019-07-22

## 2019-07-22 ENCOUNTER — CARE COORDINATION (OUTPATIENT)
Dept: CASE MANAGEMENT | Age: 64
End: 2019-07-22

## 2019-07-22 LAB
FINAL REPORT: NORMAL
FINAL REPORT: NORMAL
PRELIMINARY: NORMAL
PRELIMINARY: NORMAL

## 2019-07-22 RX ORDER — GUAIFENESIN/DEXTROMETHORPHAN 100-10MG/5
5 SYRUP ORAL EVERY 4 HOURS PRN
Qty: 240 ML | Refills: 1 | Status: SHIPPED | OUTPATIENT
Start: 2019-07-22 | End: 2019-08-01

## 2019-07-22 NOTE — CARE COORDINATION
[facility] Transitions Follow Up Call    2019    Patient: Cindy Leger  Patient : 1955   MRN: [id number]  Reason for Admission: resp failure  Discharge Date: 19 RARS: Readmission Risk Score: 30    Unable to reach patient by phone. Message left stating purpose of call with contact information requesting return call.      Follow Up  Future Appointments   Date Time Provider Calvin Mcmullen   2019 12:00 PM MMO CT RM 1 [facility] [place] [facility]   2019  1:15 PM Korin Sanford MD [facility] MONO Mota RN

## 2019-07-22 NOTE — TELEPHONE ENCOUNTER
I sent in rx.
Notified KATT Lou asking pt to return call to notify pt as well.
she has another flare then refer for open lung biopsy  · Continue sinus regimen  · Lasix  · PRN albuterol and albuterol nebs  · Declined WILFRIDO evaluation  · Chest CT in 6 weeks

## 2019-07-23 ENCOUNTER — CARE COORDINATION (OUTPATIENT)
Dept: CASE MANAGEMENT | Age: 64
End: 2019-07-23

## 2019-07-24 ENCOUNTER — CARE COORDINATION (OUTPATIENT)
Dept: CARE COORDINATION | Age: 64
End: 2019-07-24

## 2019-07-24 ENCOUNTER — TELEPHONE (OUTPATIENT)
Dept: PULMONOLOGY | Age: 64
End: 2019-07-24

## 2019-07-24 DIAGNOSIS — R05.9 COUGH: Primary | ICD-10-CM

## 2019-07-24 SDOH — HEALTH STABILITY: PHYSICAL HEALTH: ON AVERAGE, HOW MANY DAYS PER WEEK DO YOU ENGAGE IN MODERATE TO STRENUOUS EXERCISE (LIKE A BRISK WALK)?: 0 DAYS

## 2019-07-24 ASSESSMENT — ENCOUNTER SYMPTOMS: DYSPNEA ASSOCIATED WITH: REST

## 2019-07-24 NOTE — CARE COORDINATION
independently?:  Yes     Current Housing:  Private Residence        Per the Fall Risk Screening, did the patient have 2 or more falls or 1 fall with injury in the past year?:  No        Are you experiencing loss of meaning?:  No  Are you experiencing loss of hope and peace?:  No     Thinking about your patient's physical health needs, are there any symptoms or problems (risk indicators) you are unsure about that require further investigation?:  Severe symptoms or problems that cause significant impact on daily life   Are the patients physical health problems impacting on their mental well-being?:  No identified areas of concern   Are there any problems with your patients lifestyle behaviors (alcohol, drugs, diet, exercise) that are impacting on physical or mental well-being?:  Some mild concern of potential negative impact on well-being   Do you have any other concerns about your patients mental well-being?  How would you rate their severity and impact on the patient?:  No identified areas of concern   How would you rate their home environment in terms of safety and stability (including domestic violence, insecure housing, neighbor harassment)?:  Consistently safe, supportive, stable, no identified problems   How do daily activities impact on the patient's well-being? (include current or anticipated unemployment, work, caregiving, access to transportation or other):  No identified problems or perceived positive benefits   How would you rate their social network (family, work, friends)?:  Restricted participation with some degree of social isolation   How would you rate their financial resources (including ability to afford all required medical care)?:  Financially secure, resources adequate, no identified problems   How wells does the patient now understand their health and well-being (symptoms, signs or risk factors) and what they need to do to manage their health?:  Reasonable to good understanding and already chloride (KLOR-CON M) 20 MEQ extended release tablet Take 1 tablet by mouth daily 7/15/19   Ray Hurtado MD   OXYGEN Inhale 3 L into the lungs 7/14/19   Ray Hurtado MD   predniSONE (DELTASONE) 20 MG tablet Take 2 tablets by mouth daily 7/3/19 8/2/19  Antonette Wheat MD   LORazepam (ATIVAN) 0.5 MG tablet Take 0.5 mg by mouth every 6 hours as needed for Anxiety. Historical Provider, MD   Azelastine-Fluticasone (DYMISTA) 137-50 MCG/ACT SUSP by Nasal route    Historical Provider, MD   Esomeprazole Magnesium (NEXIUM 24HR PO) Take by mouth    Historical Provider, MD   hydrOXYzine (ATARAX) 10 MG tablet Take 10 mg by mouth 3 times daily as needed for Itching    Historical Provider, MD   mirtazapine (REMERON) 30 MG tablet TAKE 1 TABLET BY MOUTH EVERY DAY AT NIGHT 6/14/19   Rishi Beavers MD   ipratropium-albuterol (DUONEB) 0.5-2.5 (3) MG/3ML SOLN nebulizer solution Inhale 3 mLs into the lungs every 6 hours as needed for Shortness of Breath 6/8/19   Hamilton Griffith MD   albuterol sulfate  (90 Base) MCG/ACT inhaler Inhale 2 puffs into the lungs 4 times daily as needed for Wheezing 5/30/19   Basilia Cee MD   levothyroxine (SYNTHROID) 150 MCG tablet TAKE 1 TABLET BY MOUTH EVERY DAY 5/2/19   Rishi Beavers MD   nadolol (CORGARD) 20 MG tablet TAKE 2 TABLETS BY MOUTH EVERY DAY  Patient taking differently: nightly TAKE 2 TABLETS BY MOUTH EVERY DAY 5/2/19   Rishi Beavers MD   amitriptyline (ELAVIL) 25 MG tablet Take 1 tablet by mouth nightly 5/2/19   Rishi Beavers MD   traZODone (DESYREL) 50 MG tablet TAKE 1 TABLET BY MOUTH NIGHTLY 5/2/19   Rishi Beavers MD   sertraline (ZOLOFT) 100 MG tablet Take 2 tablets by mouth daily 5/2/19   Rishi Beavers MD   acetaminophen (TYLENOL) 500 MG tablet Take 1,000 mg by mouth 4/16/16   Historical Provider, MD   aspirin 81 MG chewable tablet Take 1 tablet by mouth daily.  4/3/14   Mary Carson MD       Future Appointments   Date Time Provider Calvin Mcmullen

## 2019-07-25 ENCOUNTER — HOSPITAL ENCOUNTER (OUTPATIENT)
Dept: GENERAL RADIOLOGY | Age: 64
Discharge: HOME | End: 2019-07-25
Payer: COMMERCIAL

## 2019-07-25 ENCOUNTER — CARE COORDINATION (OUTPATIENT)
Dept: CARE COORDINATION | Age: 64
End: 2019-07-25

## 2019-07-25 ENCOUNTER — HOSPITAL ENCOUNTER (OUTPATIENT)
Age: 64
Discharge: HOME | End: 2019-07-25
Payer: COMMERCIAL

## 2019-07-25 DIAGNOSIS — R05.9 COUGH: ICD-10-CM

## 2019-07-25 PROCEDURE — 71046 X-RAY EXAM CHEST 2 VIEWS: CPT

## 2019-07-25 NOTE — DISCHARGE SUMMARY
Name:  Odette Peters  Room:  /5528-21  MRN:    [id number]    Discharge Summary      This discharge summary is in conjunction with a complete physical exam done on the day of discharge. Discharging Physician: Dr. Rao Pichardo: 6/26/2019  Discharge:  7/3/2019    HPI taken from admission H&P:      The patient is a 75 y.o. female with COPD, anxiety, HLD, HTN, hypothyroidism who presented to [facility] ED with complaint of SOB. Patient was recently admitted for the same and treated for recurrent PNA and sent home on Abx. She reports that she completed the Abx but has not felt any better. Still having a productive cough and feeling extremely SOB despite home O2. She reports that she cant sleep bc of the cough and has been increasingly fatigued. Denies any fevers at home. Reports compliance with her home medications. Denies any CP. No leg swelling. Diagnoses this Admission and Hospital Course     Pneumonia  - Recent admission for the same. Had already completed a course of Levaquin and had worsening symptoms and was treated with Vanc and Merrem for 4 days and DCd home on Bactrim    - She has since completed her course of Bactrim and still having symptoms   - Pulmonology consulted  - concerns for possible gram negative and MRSA organisms   - treated with 3 days of Vanc and 8 days of Merrem   - underwent bronch with BAL 6/27- No growth in cultures   - dc'd on prednisone 40 mg daily until pulm OP f/u      Acute on Chronic hypoxic respiratory failure - no history of smoking or prior lung disease  Transferred to ICU for increased O2 last week. She is off Bipap. On 4 L of oxygen at dc     Acute on chronic diastolic CHF  - vascular congestion and possible edema on CXR. one dose of iv lasix. ECHO showed diastolic dysfunction.   Continue Oral  Lasix.     Atrial fibrillation   - Had new onset during last admit with RVR and quickly converted back to NSR  - [id number] is 3, but was not AC given 1 episode which was caused by pneumonia and  resolved quickly   - Continue home BB  - Rate is controlled      Hypothyroidism  - home dose of synthroid continued  - TSH wnl on 6/18     Anxiety  - home meds continued      Morbid Obesity  - Body mass index is 38.99 kg/m². - Complicating assessment and treatment. Placing patient at risk for multiple co-morbidities as well as early death and contributing to the patient's presentation.   - Counseled on weight loss    Procedures (Please Review Full Report for Details)  Bronchoscopy 6/27/19  The scope was passed with ease via the ETT. Direct visualization of the lymph nodes was obtained using endobronchial ultrasound (EBUS) guidance. A complete ultrasound lymph node exam was performed for lymph node stations 2, 4, 7, 10 and 11. The following lymph nodes were subjected to EBUS guided biopsy using standard technique and in the following order:  1.  4R mildly enlarged, 3 biopsies  2.  7N. Enlarged, 4 biopsies   3. Flow cytometry from 7N sent  Subsequently, a standard bronchoscope was used to perform a complete airway inspection. This appeared normal.  There was scant yellow secretions. 1.  Washings were obtained from throughout the airways. 2.  A bronchoalveolar lavage was obtained from the SHAZIA  4. Transbronchial biopsies were obtained from the SHAZIA under fluoroscopic guidance   ASIF (Rapid On-Site Examination) preliminary report: lymphocytes and atypical cells  The patient tolerated the procedure well. EBL < 10cc. In recovery she had hypoxia requiring the initiation of Bipap. She was sent to the ICU instead of 2W. CXR showed likely pulmonary edema       Consults    Pulmonology       Physical Exam at Discharge:    /83   Pulse 63   Temp 97.6 °F (36.4 °C) (Oral)   Resp 21   Ht 5' 0.35\" (1.533 m)   Wt 203 lb 1.6 oz (92.1 kg)   SpO2 96%   BMI 39.21 kg/m²     General:  Elderly female not in any distress, sitting up on a chair. Awake, alert and oriented.  Appears to be not in any

## 2019-07-29 LAB
FUNGUS (MYCOLOGY) CULTURE: NORMAL
FUNGUS (MYCOLOGY) CULTURE: NORMAL
FUNGUS STAIN: NORMAL
FUNGUS STAIN: NORMAL

## 2019-07-30 ENCOUNTER — CARE COORDINATION (OUTPATIENT)
Dept: CARE COORDINATION | Age: 64
End: 2019-07-30

## 2019-07-30 NOTE — CARE COORDINATION
SW researched area for ERS assistance. No local providers assists with systems. Contacted pt and updated with information. Pt reported that she is home alone 4p-2a [place] Hx of falls but not frequent. Informed pt that most ERS are $20-25, inquired if she would like options sent to her. Pt consented. Alert systems mailed.   ISRAEL Vernon, [facility]   [phone]

## 2019-08-01 ENCOUNTER — CARE COORDINATION (OUTPATIENT)
Dept: CARE COORDINATION | Age: 64
End: 2019-08-01

## 2019-08-04 ENCOUNTER — APPOINTMENT (OUTPATIENT)
Dept: GENERAL RADIOLOGY | Age: 64
DRG: 189 | End: 2019-08-04
Payer: COMMERCIAL

## 2019-08-04 ENCOUNTER — HOSPITAL ENCOUNTER (INPATIENT)
Age: 64
LOS: 5 days | Discharge: ANOTHER HOSPITAL | DRG: 189 | End: 2019-08-09
Attending: EMERGENCY MEDICINE | Admitting: INTERNAL MEDICINE
Payer: COMMERCIAL

## 2019-08-04 ENCOUNTER — TELEPHONE (OUTPATIENT)
Dept: OTHER | Facility: CLINIC | Age: 64
End: 2019-08-04

## 2019-08-04 DIAGNOSIS — R06.00 DYSPNEA, UNSPECIFIED TYPE: Primary | ICD-10-CM

## 2019-08-04 DIAGNOSIS — R06.89 RESPIRATORY INSUFFICIENCY: ICD-10-CM

## 2019-08-04 DIAGNOSIS — R09.02 HYPOXIA: ICD-10-CM

## 2019-08-04 PROBLEM — J44.1 COPD EXACERBATION (HCC): Status: ACTIVE | Noted: 2019-08-04

## 2019-08-04 PROBLEM — J44.9 COPD (CHRONIC OBSTRUCTIVE PULMONARY DISEASE) (HCC): Status: ACTIVE | Noted: 2019-08-04

## 2019-08-04 LAB
A/G RATIO: 1.1 (ref 1.1–2.2)
ALBUMIN SERPL-MCNC: 4 G/DL (ref 3.4–5)
ALP BLD-CCNC: 114 U/L (ref 40–129)
ALT SERPL-CCNC: 23 U/L (ref 10–40)
ANION GAP SERPL CALCULATED.3IONS-SCNC: 15 MMOL/L (ref 3–16)
AST SERPL-CCNC: 48 U/L (ref 15–37)
BASOPHILS ABSOLUTE: 0.1 K/UL (ref 0–0.2)
BASOPHILS RELATIVE PERCENT: 0.6 %
BILIRUB SERPL-MCNC: 0.5 MG/DL (ref 0–1)
BUN BLDV-MCNC: 27 MG/DL (ref 7–20)
CALCIUM SERPL-MCNC: 9.5 MG/DL (ref 8.3–10.6)
CHLORIDE BLD-SCNC: 101 MMOL/L (ref 99–110)
CO2: 21 MMOL/L (ref 21–32)
CREAT SERPL-MCNC: 0.6 MG/DL (ref 0.6–1.2)
EKG ATRIAL RATE: 86 BPM
EKG DIAGNOSIS: NORMAL
EKG P AXIS: 34 DEGREES
EKG P-R INTERVAL: 154 MS
EKG Q-T INTERVAL: 370 MS
EKG QRS DURATION: 74 MS
EKG QTC CALCULATION (BAZETT): 442 MS
EKG R AXIS: 55 DEGREES
EKG T AXIS: 54 DEGREES
EKG VENTRICULAR RATE: 86 BPM
EOSINOPHILS ABSOLUTE: 1 K/UL (ref 0–0.6)
EOSINOPHILS RELATIVE PERCENT: 5.1 %
GFR AFRICAN AMERICAN: >60
GFR NON-AFRICAN AMERICAN: >60
GLOBULIN: 3.5 G/DL
GLUCOSE BLD-MCNC: 317 MG/DL (ref 70–99)
HCT VFR BLD CALC: 39 % (ref 36–48)
HEMOGLOBIN: 12.5 G/DL (ref 12–16)
LACTIC ACID: 3 MMOL/L (ref 0.4–2)
LYMPHOCYTES ABSOLUTE: 1.7 K/UL (ref 1–5.1)
LYMPHOCYTES RELATIVE PERCENT: 8.6 %
MCH RBC QN AUTO: 26.1 PG (ref 26–34)
MCHC RBC AUTO-ENTMCNC: 32.1 G/DL (ref 31–36)
MCV RBC AUTO: 81.3 FL (ref 80–100)
MONOCYTES ABSOLUTE: 0.6 K/UL (ref 0–1.3)
MONOCYTES RELATIVE PERCENT: 2.9 %
NEUTROPHILS ABSOLUTE: 16.7 K/UL (ref 1.7–7.7)
NEUTROPHILS RELATIVE PERCENT: 82.8 %
PDW BLD-RTO: 16.8 % (ref 12.4–15.4)
PLATELET # BLD: 244 K/UL (ref 135–450)
PMV BLD AUTO: 7.7 FL (ref 5–10.5)
POTASSIUM REFLEX MAGNESIUM: 4.3 MMOL/L (ref 3.5–5.1)
PRO-BNP: 72 PG/ML (ref 0–124)
RBC # BLD: 4.79 M/UL (ref 4–5.2)
SODIUM BLD-SCNC: 137 MMOL/L (ref 136–145)
TOTAL PROTEIN: 7.5 G/DL (ref 6.4–8.2)
TROPONIN: <0.01 NG/ML
WBC # BLD: 20.2 K/UL (ref 4–11)

## 2019-08-04 PROCEDURE — 80053 COMPREHEN METABOLIC PANEL: CPT

## 2019-08-04 PROCEDURE — 6370000000 HC RX 637 (ALT 250 FOR IP): Performed by: INTERNAL MEDICINE

## 2019-08-04 PROCEDURE — 6370000000 HC RX 637 (ALT 250 FOR IP): Performed by: EMERGENCY MEDICINE

## 2019-08-04 PROCEDURE — 83605 ASSAY OF LACTIC ACID: CPT

## 2019-08-04 PROCEDURE — 71046 X-RAY EXAM CHEST 2 VIEWS: CPT

## 2019-08-04 PROCEDURE — 2580000003 HC RX 258: Performed by: INTERNAL MEDICINE

## 2019-08-04 PROCEDURE — 94640 AIRWAY INHALATION TREATMENT: CPT

## 2019-08-04 PROCEDURE — 94150 VITAL CAPACITY TEST: CPT

## 2019-08-04 PROCEDURE — 84484 ASSAY OF TROPONIN QUANT: CPT

## 2019-08-04 PROCEDURE — 36415 COLL VENOUS BLD VENIPUNCTURE: CPT

## 2019-08-04 PROCEDURE — 6360000002 HC RX W HCPCS: Performed by: EMERGENCY MEDICINE

## 2019-08-04 PROCEDURE — 94761 N-INVAS EAR/PLS OXIMETRY MLT: CPT

## 2019-08-04 PROCEDURE — 93005 ELECTROCARDIOGRAM TRACING: CPT | Performed by: EMERGENCY MEDICINE

## 2019-08-04 PROCEDURE — 1200000000 HC SEMI PRIVATE

## 2019-08-04 PROCEDURE — 83880 ASSAY OF NATRIURETIC PEPTIDE: CPT

## 2019-08-04 PROCEDURE — 96374 THER/PROPH/DIAG INJ IV PUSH: CPT

## 2019-08-04 PROCEDURE — 2700000000 HC OXYGEN THERAPY PER DAY

## 2019-08-04 PROCEDURE — 87040 BLOOD CULTURE FOR BACTERIA: CPT

## 2019-08-04 PROCEDURE — 99285 EMERGENCY DEPT VISIT HI MDM: CPT

## 2019-08-04 PROCEDURE — 6360000002 HC RX W HCPCS: Performed by: INTERNAL MEDICINE

## 2019-08-04 PROCEDURE — 85025 COMPLETE CBC W/AUTO DIFF WBC: CPT

## 2019-08-04 PROCEDURE — 93010 ELECTROCARDIOGRAM REPORT: CPT | Performed by: INTERNAL MEDICINE

## 2019-08-04 RX ORDER — NADOLOL 40 MG/1
20 TABLET ORAL DAILY
Status: DISCONTINUED | OUTPATIENT
Start: 2019-08-04 | End: 2019-08-09 | Stop reason: HOSPADM

## 2019-08-04 RX ORDER — METHYLPREDNISOLONE SODIUM SUCCINATE 125 MG/2ML
125 INJECTION, POWDER, LYOPHILIZED, FOR SOLUTION INTRAMUSCULAR; INTRAVENOUS ONCE
Status: COMPLETED | OUTPATIENT
Start: 2019-08-04 | End: 2019-08-04

## 2019-08-04 RX ORDER — METHYLPREDNISOLONE SODIUM SUCCINATE 40 MG/ML
40 INJECTION, POWDER, LYOPHILIZED, FOR SOLUTION INTRAMUSCULAR; INTRAVENOUS DAILY
Status: DISCONTINUED | OUTPATIENT
Start: 2019-08-05 | End: 2019-08-09 | Stop reason: HOSPADM

## 2019-08-04 RX ORDER — MIRTAZAPINE 15 MG/1
15 TABLET, FILM COATED ORAL NIGHTLY
Status: DISCONTINUED | OUTPATIENT
Start: 2019-08-04 | End: 2019-08-09 | Stop reason: HOSPADM

## 2019-08-04 RX ORDER — FUROSEMIDE 40 MG/1
40 TABLET ORAL DAILY
Status: DISCONTINUED | OUTPATIENT
Start: 2019-08-05 | End: 2019-08-05

## 2019-08-04 RX ORDER — LORAZEPAM 0.5 MG/1
0.5 TABLET ORAL 2 TIMES DAILY PRN
Status: DISCONTINUED | OUTPATIENT
Start: 2019-08-04 | End: 2019-08-09 | Stop reason: HOSPADM

## 2019-08-04 RX ORDER — GUAIFENESIN/DEXTROMETHORPHAN 100-10MG/5
5 SYRUP ORAL EVERY 6 HOURS PRN
Status: DISCONTINUED | OUTPATIENT
Start: 2019-08-04 | End: 2019-08-09 | Stop reason: HOSPADM

## 2019-08-04 RX ORDER — IPRATROPIUM BROMIDE AND ALBUTEROL SULFATE 2.5; .5 MG/3ML; MG/3ML
3 SOLUTION RESPIRATORY (INHALATION) EVERY 4 HOURS
Status: DISCONTINUED | OUTPATIENT
Start: 2019-08-04 | End: 2019-08-06

## 2019-08-04 RX ORDER — SERTRALINE HYDROCHLORIDE 100 MG/1
200 TABLET, FILM COATED ORAL DAILY
Status: DISCONTINUED | OUTPATIENT
Start: 2019-08-04 | End: 2019-08-09 | Stop reason: HOSPADM

## 2019-08-04 RX ORDER — ALBUTEROL SULFATE 2.5 MG/3ML
2.5 SOLUTION RESPIRATORY (INHALATION) EVERY 6 HOURS PRN
Status: DISCONTINUED | OUTPATIENT
Start: 2019-08-04 | End: 2019-08-09 | Stop reason: HOSPADM

## 2019-08-04 RX ORDER — SODIUM CHLORIDE 9 MG/ML
INJECTION, SOLUTION INTRAVENOUS CONTINUOUS
Status: DISCONTINUED | OUTPATIENT
Start: 2019-08-04 | End: 2019-08-05

## 2019-08-04 RX ORDER — AMITRIPTYLINE HYDROCHLORIDE 25 MG/1
25 TABLET, FILM COATED ORAL NIGHTLY
Status: DISCONTINUED | OUTPATIENT
Start: 2019-08-04 | End: 2019-08-09 | Stop reason: HOSPADM

## 2019-08-04 RX ORDER — ACETAMINOPHEN 500 MG
500 TABLET ORAL EVERY 6 HOURS PRN
Status: DISCONTINUED | OUTPATIENT
Start: 2019-08-04 | End: 2019-08-09 | Stop reason: HOSPADM

## 2019-08-04 RX ORDER — TRAZODONE HYDROCHLORIDE 100 MG/1
100 TABLET ORAL NIGHTLY PRN
Status: DISCONTINUED | OUTPATIENT
Start: 2019-08-04 | End: 2019-08-09 | Stop reason: HOSPADM

## 2019-08-04 RX ORDER — SODIUM CHLORIDE 0.9 % (FLUSH) 0.9 %
10 SYRINGE (ML) INJECTION EVERY 12 HOURS SCHEDULED
Status: DISCONTINUED | OUTPATIENT
Start: 2019-08-04 | End: 2019-08-09 | Stop reason: HOSPADM

## 2019-08-04 RX ORDER — HYDROXYZINE HYDROCHLORIDE 10 MG/1
10 TABLET, FILM COATED ORAL 3 TIMES DAILY PRN
Status: DISCONTINUED | OUTPATIENT
Start: 2019-08-04 | End: 2019-08-09 | Stop reason: HOSPADM

## 2019-08-04 RX ORDER — SODIUM CHLORIDE 0.9 % (FLUSH) 0.9 %
10 SYRINGE (ML) INJECTION PRN
Status: DISCONTINUED | OUTPATIENT
Start: 2019-08-04 | End: 2019-08-09 | Stop reason: HOSPADM

## 2019-08-04 RX ORDER — LEVOTHYROXINE SODIUM 0.15 MG/1
150 TABLET ORAL DAILY
Status: DISCONTINUED | OUTPATIENT
Start: 2019-08-04 | End: 2019-08-04

## 2019-08-04 RX ORDER — IPRATROPIUM BROMIDE AND ALBUTEROL SULFATE 2.5; .5 MG/3ML; MG/3ML
3 SOLUTION RESPIRATORY (INHALATION) ONCE
Status: COMPLETED | OUTPATIENT
Start: 2019-08-04 | End: 2019-08-04

## 2019-08-04 RX ORDER — ASPIRIN 81 MG/1
81 TABLET, CHEWABLE ORAL DAILY
Status: DISCONTINUED | OUTPATIENT
Start: 2019-08-04 | End: 2019-08-09 | Stop reason: HOSPADM

## 2019-08-04 RX ORDER — LEVOTHYROXINE SODIUM 0.15 MG/1
150 TABLET ORAL DAILY
Status: DISCONTINUED | OUTPATIENT
Start: 2019-08-05 | End: 2019-08-09 | Stop reason: HOSPADM

## 2019-08-04 RX ORDER — ONDANSETRON 2 MG/ML
4 INJECTION INTRAMUSCULAR; INTRAVENOUS EVERY 6 HOURS PRN
Status: DISCONTINUED | OUTPATIENT
Start: 2019-08-04 | End: 2019-08-09 | Stop reason: HOSPADM

## 2019-08-04 RX ORDER — IPRATROPIUM BROMIDE AND ALBUTEROL SULFATE 2.5; .5 MG/3ML; MG/3ML
3 SOLUTION RESPIRATORY (INHALATION) EVERY 6 HOURS PRN
Status: DISCONTINUED | OUTPATIENT
Start: 2019-08-04 | End: 2019-08-04

## 2019-08-04 RX ADMIN — GUAIFENESIN AND DEXTROMETHORPHAN 5 ML: 100; 10 SYRUP ORAL at 20:52

## 2019-08-04 RX ADMIN — SODIUM CHLORIDE: 9 INJECTION, SOLUTION INTRAVENOUS at 18:32

## 2019-08-04 RX ADMIN — TRAZODONE HYDROCHLORIDE 100 MG: 100 TABLET ORAL at 20:52

## 2019-08-04 RX ADMIN — SODIUM CHLORIDE: 9 INJECTION, SOLUTION INTRAVENOUS at 20:52

## 2019-08-04 RX ADMIN — METHYLPREDNISOLONE SODIUM SUCCINATE 125 MG: 125 INJECTION, POWDER, FOR SOLUTION INTRAMUSCULAR; INTRAVENOUS at 14:46

## 2019-08-04 RX ADMIN — MIRTAZAPINE 15 MG: 15 TABLET, FILM COATED ORAL at 20:52

## 2019-08-04 RX ADMIN — IPRATROPIUM BROMIDE AND ALBUTEROL SULFATE 3 ML: .5; 3 SOLUTION RESPIRATORY (INHALATION) at 23:08

## 2019-08-04 RX ADMIN — IPRATROPIUM BROMIDE AND ALBUTEROL SULFATE 3 ML: .5; 3 SOLUTION RESPIRATORY (INHALATION) at 18:52

## 2019-08-04 RX ADMIN — HYDROXYZINE HYDROCHLORIDE 10 MG: 10 TABLET, FILM COATED ORAL at 20:52

## 2019-08-04 RX ADMIN — IPRATROPIUM BROMIDE AND ALBUTEROL SULFATE 3 AMPULE: .5; 3 SOLUTION RESPIRATORY (INHALATION) at 14:32

## 2019-08-04 RX ADMIN — ENOXAPARIN SODIUM 40 MG: 40 INJECTION SUBCUTANEOUS at 18:32

## 2019-08-04 RX ADMIN — AMITRIPTYLINE HYDROCHLORIDE 25 MG: 25 TABLET, FILM COATED ORAL at 20:52

## 2019-08-04 ASSESSMENT — PAIN SCALES - GENERAL: PAINLEVEL_OUTOF10: 0

## 2019-08-04 NOTE — PROGRESS NOTES
Consult has been called to Dr. Scott Melvin on 8/4/19. Spoke with jennifer.  5:23 PM    [facility]  8/4/2019

## 2019-08-04 NOTE — ED NOTES
Pt states she feels \"better\" after HHN txs, resps even and unlab, labs pending     79 Ken García RN  08/04/19 2949

## 2019-08-05 LAB
ANION GAP SERPL CALCULATED.3IONS-SCNC: 15 MMOL/L (ref 3–16)
BASOPHILS ABSOLUTE: 0.1 K/UL (ref 0–0.2)
BASOPHILS RELATIVE PERCENT: 0.3 %
BUN BLDV-MCNC: 21 MG/DL (ref 7–20)
CALCIUM SERPL-MCNC: 9.3 MG/DL (ref 8.3–10.6)
CHLORIDE BLD-SCNC: 96 MMOL/L (ref 99–110)
CO2: 21 MMOL/L (ref 21–32)
CREAT SERPL-MCNC: 0.6 MG/DL (ref 0.6–1.2)
EOSINOPHILS ABSOLUTE: 0 K/UL (ref 0–0.6)
EOSINOPHILS RELATIVE PERCENT: 0.1 %
GFR AFRICAN AMERICAN: >60
GFR NON-AFRICAN AMERICAN: >60
GLUCOSE BLD-MCNC: 287 MG/DL (ref 70–99)
HCT VFR BLD CALC: 35.3 % (ref 36–48)
HEMOGLOBIN: 11.3 G/DL (ref 12–16)
LACTIC ACID: 3.8 MMOL/L (ref 0.4–2)
LYMPHOCYTES ABSOLUTE: 2.2 K/UL (ref 1–5.1)
LYMPHOCYTES RELATIVE PERCENT: 12.2 %
MCH RBC QN AUTO: 26.1 PG (ref 26–34)
MCHC RBC AUTO-ENTMCNC: 32 G/DL (ref 31–36)
MCV RBC AUTO: 81.5 FL (ref 80–100)
MONOCYTES ABSOLUTE: 0.9 K/UL (ref 0–1.3)
MONOCYTES RELATIVE PERCENT: 4.9 %
NEUTROPHILS ABSOLUTE: 15.2 K/UL (ref 1.7–7.7)
NEUTROPHILS RELATIVE PERCENT: 82.5 %
PDW BLD-RTO: 16.5 % (ref 12.4–15.4)
PLATELET # BLD: 219 K/UL (ref 135–450)
PMV BLD AUTO: 7.7 FL (ref 5–10.5)
POTASSIUM REFLEX MAGNESIUM: 4 MMOL/L (ref 3.5–5.1)
RBC # BLD: 4.33 M/UL (ref 4–5.2)
SODIUM BLD-SCNC: 132 MMOL/L (ref 136–145)
WBC # BLD: 18.4 K/UL (ref 4–11)

## 2019-08-05 PROCEDURE — 94761 N-INVAS EAR/PLS OXIMETRY MLT: CPT

## 2019-08-05 PROCEDURE — 6370000000 HC RX 637 (ALT 250 FOR IP): Performed by: PHYSICIAN ASSISTANT

## 2019-08-05 PROCEDURE — 6370000000 HC RX 637 (ALT 250 FOR IP): Performed by: INTERNAL MEDICINE

## 2019-08-05 PROCEDURE — 94640 AIRWAY INHALATION TREATMENT: CPT

## 2019-08-05 PROCEDURE — 6360000002 HC RX W HCPCS: Performed by: INTERNAL MEDICINE

## 2019-08-05 PROCEDURE — 80048 BASIC METABOLIC PNL TOTAL CA: CPT

## 2019-08-05 PROCEDURE — 85025 COMPLETE CBC W/AUTO DIFF WBC: CPT

## 2019-08-05 PROCEDURE — 99223 1ST HOSP IP/OBS HIGH 75: CPT | Performed by: INTERNAL MEDICINE

## 2019-08-05 PROCEDURE — 83605 ASSAY OF LACTIC ACID: CPT

## 2019-08-05 PROCEDURE — 1200000000 HC SEMI PRIVATE

## 2019-08-05 PROCEDURE — 83036 HEMOGLOBIN GLYCOSYLATED A1C: CPT

## 2019-08-05 PROCEDURE — 94669 MECHANICAL CHEST WALL OSCILL: CPT

## 2019-08-05 PROCEDURE — 99222 1ST HOSP IP/OBS MODERATE 55: CPT | Performed by: INTERNAL MEDICINE

## 2019-08-05 PROCEDURE — 36415 COLL VENOUS BLD VENIPUNCTURE: CPT

## 2019-08-05 PROCEDURE — 2700000000 HC OXYGEN THERAPY PER DAY

## 2019-08-05 PROCEDURE — 2580000003 HC RX 258: Performed by: INTERNAL MEDICINE

## 2019-08-05 RX ORDER — SULFAMETHOXAZOLE AND TRIMETHOPRIM 800; 160 MG/1; MG/1
1 TABLET ORAL EVERY OTHER DAY
Status: DISCONTINUED | OUTPATIENT
Start: 2019-08-05 | End: 2019-08-09 | Stop reason: HOSPADM

## 2019-08-05 RX ADMIN — Medication 10 ML: at 20:37

## 2019-08-05 RX ADMIN — ACETAMINOPHEN 500 MG: 500 TABLET ORAL at 01:46

## 2019-08-05 RX ADMIN — TRAZODONE HYDROCHLORIDE 100 MG: 100 TABLET ORAL at 20:37

## 2019-08-05 RX ADMIN — IPRATROPIUM BROMIDE AND ALBUTEROL SULFATE 3 ML: .5; 3 SOLUTION RESPIRATORY (INHALATION) at 07:27

## 2019-08-05 RX ADMIN — IPRATROPIUM BROMIDE AND ALBUTEROL SULFATE 3 ML: .5; 3 SOLUTION RESPIRATORY (INHALATION) at 15:04

## 2019-08-05 RX ADMIN — SULFAMETHOXAZOLE AND TRIMETHOPRIM 1 TABLET: 800; 160 TABLET ORAL at 09:26

## 2019-08-05 RX ADMIN — ENOXAPARIN SODIUM 40 MG: 40 INJECTION SUBCUTANEOUS at 09:26

## 2019-08-05 RX ADMIN — METHYLPREDNISOLONE SODIUM SUCCINATE 40 MG: 40 INJECTION, POWDER, FOR SOLUTION INTRAMUSCULAR; INTRAVENOUS at 09:27

## 2019-08-05 RX ADMIN — Medication 10 ML: at 09:27

## 2019-08-05 RX ADMIN — LORAZEPAM 0.5 MG: 0.5 TABLET ORAL at 18:22

## 2019-08-05 RX ADMIN — GUAIFENESIN AND DEXTROMETHORPHAN 5 ML: 100; 10 SYRUP ORAL at 12:52

## 2019-08-05 RX ADMIN — MIRTAZAPINE 15 MG: 15 TABLET, FILM COATED ORAL at 20:37

## 2019-08-05 RX ADMIN — AMITRIPTYLINE HYDROCHLORIDE 25 MG: 25 TABLET, FILM COATED ORAL at 20:37

## 2019-08-05 RX ADMIN — SERTRALINE 200 MG: 100 TABLET, FILM COATED ORAL at 09:26

## 2019-08-05 RX ADMIN — ASPIRIN 81 MG 81 MG: 81 TABLET ORAL at 09:26

## 2019-08-05 RX ADMIN — LEVOTHYROXINE SODIUM 150 MCG: 150 TABLET ORAL at 05:12

## 2019-08-05 RX ADMIN — LORAZEPAM 0.5 MG: 0.5 TABLET ORAL at 01:49

## 2019-08-05 RX ADMIN — IPRATROPIUM BROMIDE AND ALBUTEROL SULFATE 3 ML: .5; 3 SOLUTION RESPIRATORY (INHALATION) at 02:52

## 2019-08-05 RX ADMIN — NADOLOL 20 MG: 40 TABLET ORAL at 09:26

## 2019-08-05 RX ADMIN — GUAIFENESIN AND DEXTROMETHORPHAN 5 ML: 100; 10 SYRUP ORAL at 05:12

## 2019-08-05 RX ADMIN — IPRATROPIUM BROMIDE AND ALBUTEROL SULFATE 3 ML: .5; 3 SOLUTION RESPIRATORY (INHALATION) at 22:50

## 2019-08-05 RX ADMIN — GUAIFENESIN AND DEXTROMETHORPHAN 5 ML: 100; 10 SYRUP ORAL at 20:40

## 2019-08-05 RX ADMIN — IPRATROPIUM BROMIDE AND ALBUTEROL SULFATE 3 ML: .5; 3 SOLUTION RESPIRATORY (INHALATION) at 18:59

## 2019-08-05 ASSESSMENT — PAIN DESCRIPTION - PROGRESSION: CLINICAL_PROGRESSION: NOT CHANGED

## 2019-08-05 ASSESSMENT — PAIN DESCRIPTION - LOCATION: LOCATION: HEAD

## 2019-08-05 ASSESSMENT — PAIN DESCRIPTION - PAIN TYPE: TYPE: ACUTE PAIN

## 2019-08-05 ASSESSMENT — PAIN SCALES - GENERAL: PAINLEVEL_OUTOF10: 3

## 2019-08-05 ASSESSMENT — PAIN DESCRIPTION - ONSET: ONSET: ON-GOING

## 2019-08-05 ASSESSMENT — PAIN - FUNCTIONAL ASSESSMENT: PAIN_FUNCTIONAL_ASSESSMENT: ACTIVITIES ARE NOT PREVENTED

## 2019-08-05 ASSESSMENT — PAIN DESCRIPTION - FREQUENCY: FREQUENCY: INTERMITTENT

## 2019-08-05 ASSESSMENT — PAIN DESCRIPTION - DESCRIPTORS: DESCRIPTORS: HEADACHE

## 2019-08-05 NOTE — DISCHARGE INSTR - COC
Continuity of Care Form    Patient Name: Giuliana Esteban   :  1955  MRN:  [id number]    Admit date:  2019  Discharge date:  ***    Code Status Order: Full Code   Advance Directives:   Advance Care Flowsheet Documentation     Date/Time Healthcare Directive Type of Healthcare Directive Copy in [address] Agent's Name Healthcare Agent's Phone Number    [phone]  Yes, patient has an advance directive for healthcare treatment  Durable power of  for health care  Yes, copy in chart  Healthcare power of   [facility]  [phone]          Admitting Physician:  Ramsey Baptiste MD  PCP: Shira Bahena MD    Discharging Nurse: [facility] Unit/Room#: 0207/0207-02  [facility] Unit Phone Number: ***    Emergency Contact:   Extended Emergency Contact Information  Primary Emergency Contact: Saul Ma JONHPILI  Address: [address] [place] [address] Phone: [phone]  Relation: Spouse  Secondary Emergency Contact: Beni Iglesias [facility] Phone: [phone]  Relation: Child    Past Surgical History:  Past Surgical History:   Procedure Laterality Date    BRONCHOSCOPY N/A 2019    EBUS WF W/ANES.  performed by Arun Winter MD at  [facility]  2019    BRONCHOSCOPY/TRANSBRONCHIAL NEEDLE BIOPSY performed by Arun Winter MD at  [facility]  2019    BRONCHOSCOPY/TRANSBRONCHIAL LUNG BIOPSY performed by Arun Winter MD at  [facility]  2019    BRONCHOSCOPY ALVEOLAR LAVAGE performed by Arun Winter MD at  [facility]  07/10/2019    BRONCHOSCOPY N/A 7/10/2019    BRONCHOSCOPY ALVEOLAR LAVAGE performed by Panda Coppola MD at [address] [place] [address], TOTAL ABDOMINAL      partial       Immunization History:   Immunization History   Administered Date(s)

## 2019-08-05 NOTE — CARE COORDINATION
[address] Transitions Interview     2019    Patient: Antony Peoples Patient : 1955   MRN: [id number]  Reason for Admission: SOB  RARS: Readmission Risk Score: 35         Spoke with: Analilia Bower (patient)      Readmission Risk  Patient Active Problem List   Diagnosis    Chest pain    HTN (hypertension)    Hyperlipidemia with target LDL less than 130    Depression    Hypokalemia    Insomnia    Trochanteric bursitis of both hips    Migraine    Syncope    Gastrointestinal hemorrhage associated with gastric ulcer    Acute blood loss anemia    Fatigue    Hypothyroidism    Mild single current episode of major depressive disorder (HCC)    Anxiety    Acute respiratory distress    Acute on chronic respiratory failure with hypoxia (HCC)    Bronchitis    Bronchospasm    Hypoxia    Multifocal pneumonia    Atrial fibrillation, chronic (HCC)    Bronchiectasis with acute exacerbation (HCC)    Pneumonia due to organism    Atypical pneumonia    Acute bronchitis with bronchospasm    Chronic diastolic congestive heart failure (HCC)    Class 2 obesity with body mass index (BMI) of 39.0 to 39.9 in adult    Abnormal chest CT    Fever    Acute diastolic heart failure (HCC)    ILD (interstitial lung disease) (HCC)    Acute respiratory failure with hypoxia (HCC)    Restrictive lung disease    Acute hypoxemic respiratory failure (HCC)    Abnormal chest x-ray    Abnormal CT of the chest    Dyspnea and respiratory abnormalities    Acute cystitis without hematuria    COPD exacerbation (HCC)    COPD (chronic obstructive pulmonary disease) [facility])       Inpatient Assessment  Care Transitions Summary    Care Transitions Inpatient Review  Medication Review  Do you have all of your prescriptions and are they filled?:  Yes   Barriers to Medication Adherence:  None  Are you able to afford your medications?:  Yes  How often do you have difficulty taking your medications?:  I always

## 2019-08-05 NOTE — PROGRESS NOTES
RESPIRATORY THERAPY ASSESSMENT    Name:  Mony Flores  Medical Record Number:  [id number]  Age: 75 y.o. Gender: female  : 1955  Today's Date:  2019  Room:  0207/0207-02    Assessment     Is the patient being admitted for a COPD or Asthma exacerbation? Yes   (If yes the patient will be seen every 4 hours for the first 24 hours and then reassessed)    Patient Admission Diagnosis      Allergies  Allergies   Allergen Reactions    Penicillins Anaphylaxis     Tolerates Meropenem.  Cefuroxime      Tolerates Meropenem.  Doxycycline Hives    Imitrex [Sumatriptan] Other (See Comments)     Feels like pins and needles    Meperidine     Sulfa Antibiotics Hives    Bactrim [Sulfamethoxazole-Trimethoprim] Rash    Keflex [Cephalexin] Itching and Rash     Tolerates Meropenem.  Tape [Adhesive Tape] Rash       Minimum Predicted Vital Capacity:     720          Actual Vital Capacity:      920              Pulmonary History:COPD, ILD, PNA, Bronchitis,  Bronchiectasis. Home Oxygen Therapy:  2 liters/min via NC  Home Respiratory Therapy:  Duoneb Q6PRN,  Albuterol Q6PRN,  Albuterol 2PUFFS QID PRN & Q6PRN. Current Respiratory Therapy:  Albuterol Q6PRN,  Duoneb Q6PRN. Treatment Type: N  Medications: Albuterol/Ipratropium    Respiratory Severity Index(RSI)   Patients with orders for inhalation medications, oxygen, or any therapeutic treatment modality will be placed on Respiratory Protocol. They will be assessed with the first treatment and at least every 72 hours thereafter. The following severity scale will be used to determine frequency of treatment intervention.     Smoking History: Mild Exacerbation = 3    Social History  Social History     Tobacco Use    Smoking status: Never Smoker    Smokeless tobacco: Never Used   Substance Use Topics    Alcohol use: No    Drug use: No       Recent Surgical History: None = 0  Past Surgical History  Past Surgical History:   Procedure Laterality Date    BRONCHOSCOPY N/A 6/27/2019    EBUS WF W/ANES. performed by Eliazar Meeks MD at [facility]  6/27/2019    BRONCHOSCOPY/TRANSBRONCHIAL NEEDLE BIOPSY performed by Eliazar Meeks MD at [facility]  6/27/2019    BRONCHOSCOPY/TRANSBRONCHIAL LUNG BIOPSY performed by Eliazar Meeks MD at [facility]  6/27/2019    BRONCHOSCOPY ALVEOLAR LAVAGE performed by Eliazar Meeks MD at [facility]  07/10/2019    BRONCHOSCOPY N/A 7/10/2019    BRONCHOSCOPY ALVEOLAR LAVAGE performed by Kiera Barreto MD at [address] [place] [address], TOTAL ABDOMINAL      partial       Level of Consciousness: Alert, Oriented, and Cooperative = 0    Level of Activity: Walking with assistance = 1    Respiratory Pattern: Dyspnea with exertion;Irregular pattern;or RR less than 6 = 2    Breath Sounds: Diminshed bilaterally and/or crackles = 2    Sputum   ,  , Sputum How Obtained: Spontaneous cough  Cough: Strong, spontaneous, non-productive = 0    Vital Signs   /80   Pulse 81   Temp 97.6 °F (36.4 °C) (Oral)   Resp 18   Ht 5' 3\" (1.6 m)   Wt 201 lb 4.8 oz (91.3 kg)   SpO2 93%   BMI 35.66 kg/m²   SPO2 (COPD values may differ): 88-89% on room air or greater than 92% on FiO2 28- 35% = 2    Peak Flow (asthma only): not applicable = 0    RSI: 4-71 = TID (three times daily) and Q4hr PRN for dyspnea        Plan       Goals: medication delivery    Patient/caregiver was educated on the proper method of use for Respiratory Care Devices:  Yes      Level of patient/caregiver understanding able to:   ? Verbalize understanding   ? Demonstrate understanding       ? Teach back        ? Needs reinforcement       ? No available caregiver               ? Other:     Response to education:  Good     Is patient being placed on Home Treatment Regimen? No     Does the patient have everything they need prior to discharge?   NA     Comments: Chart reviewed and patient assessed. Plan of Care: Duoneb Q4H PER 24 HOUR PROTOCOL,  Albuterol Q6PRN. Electronically signed by Truman Alberto RCP on 8/4/2019 at 9:28 PM    Respiratory Protocol Guidelines     1. Assessment and treatment by Respiratory Therapy will be initiated for medication and therapeutic interventions upon initiation of aerosolized medication. 2. Physician will be contacted for respiratory rate (RR) greater than 35 breaths per minute. Therapy will be held for heart rate (HR) greater than 140 beats per minute, pending direction from physician. 3. Bronchodilators will be administered via Metered Dose Inhaler (MDI) with spacer when the following criteria are met:  a. Alert and cooperative     b. HR < 140 bpm  c. RR < 30 bpm                d. Can demonstrate a 2-3 second inspiratory hold  4. Bronchodilators will be administered via Hand Held Nebulizer SYDNI [facility]) to patients when ANY of the following criteria are met  a. Incognizant or uncooperative          b. Patients treated with HHN at Home        c. Unable to demonstrate proper use of MDI with spacer     d. RR > 30 bpm   5. Bronchodilators will be delivered via Metered Dose Inhaler (MDI), HHN, Aerogen to intubated patients on mechanical ventilation. 6. Inhalation medication orders will be delivered and/or substituted as outlined below. Aerosolized Medications Ordering and Administration Guidelines:    1. All Medications will be ordered by a physician, and their frequency and/or modality will be adjusted as defined by the patients Respiratory Severity Index (RSI) score. 2. If the patient does not have documented COPD, consider discontinuing anticholinergics when RSI is less than 9.  3. If the bronchospasm worsens (increased RSI), then the bronchodilator frequency can be increased to a maximum of every 4 hours. If greater than every 4 hours is required, the physician will be contacted.   4. If the bronchospasm improves, the frequency of the

## 2019-08-05 NOTE — PROGRESS NOTES
Assessment complete. Vital signs stable on 3L per NC . Call light within reach. Allergy bands, ID band intact. Medications given per MAR. Denies any needs at this time. Will continue to monitor.

## 2019-08-05 NOTE — FLOWSHEET NOTE
08/04/19 2045   Vital Signs   Temp 97.6 °F (36.4 °C)   Temp Source Oral   Pulse 81   Heart Rate Source Monitor   Resp 18   /80   BP Location Left upper arm   Patient Position Semi fowlers   Level of Consciousness 0   MEWS Score 1   Oxygen Therapy   SpO2 93 %   O2 Device Nasal cannula   O2 Flow Rate (L/min) 4 L/min   Pt assessment complete, see flowsheets. Pt denies needs at this time, given PRN medication per pt request and PRN parameters. Will continue to monitor.   Richard Lala RN

## 2019-08-06 LAB
APPEARANCE BAL (LAVAGE): CLEAR
CLOT EVALUATION BAL: ABNORMAL
COLOR LAVAGE: COLORLESS
EOSIN: 3 %
EPITHELIAL CELLS FLUID: 8 %
ESTIMATED AVERAGE GLUCOSE: 203 MG/DL
GLUCOSE BLD-MCNC: 343 MG/DL (ref 70–99)
HBA1C MFR BLD: 8.7 %
HCT VFR BLD CALC: 33.2 % (ref 36–48)
HEMOGLOBIN: 10.5 G/DL (ref 12–16)
LYMPHOCYTES, BAL: 5 % (ref 5–10)
MACROPHAGES, BAL: 12 % (ref 90–95)
MCH RBC QN AUTO: 25.8 PG (ref 26–34)
MCHC RBC AUTO-ENTMCNC: 31.5 G/DL (ref 31–36)
MCV RBC AUTO: 81.8 FL (ref 80–100)
NUMBER OF CELLS COUNTED BAL (LAVAGE): 200
PDW BLD-RTO: 16.8 % (ref 12.4–15.4)
PERFORMED ON: ABNORMAL
PLATELET # BLD: 182 K/UL (ref 135–450)
PMV BLD AUTO: 7.4 FL (ref 5–10.5)
RBC # BLD: 4.06 M/UL (ref 4–5.2)
RBC, BAL: 800 /CUMM
SEGMENTED NEUTROPHILS, BAL: 72 % (ref 5–10)
WBC # BLD: 16.1 K/UL (ref 4–11)
WBC/EPI CELLS BAL: 208 /CUMM

## 2019-08-06 PROCEDURE — 6370000000 HC RX 637 (ALT 250 FOR IP): Performed by: INTERNAL MEDICINE

## 2019-08-06 PROCEDURE — 88312 SPECIAL STAINS GROUP 1: CPT

## 2019-08-06 PROCEDURE — 94669 MECHANICAL CHEST WALL OSCILL: CPT

## 2019-08-06 PROCEDURE — 87116 MYCOBACTERIA CULTURE: CPT

## 2019-08-06 PROCEDURE — 1200000000 HC SEMI PRIVATE

## 2019-08-06 PROCEDURE — 2709999900 HC NON-CHARGEABLE SUPPLY: Performed by: INTERNAL MEDICINE

## 2019-08-06 PROCEDURE — 99232 SBSQ HOSP IP/OBS MODERATE 35: CPT | Performed by: INTERNAL MEDICINE

## 2019-08-06 PROCEDURE — 87102 FUNGUS ISOLATION CULTURE: CPT

## 2019-08-06 PROCEDURE — 87015 SPECIMEN INFECT AGNT CONCNTJ: CPT

## 2019-08-06 PROCEDURE — 3609010800 HC BRONCHOSCOPY ALVEOLAR LAVAGE: Performed by: INTERNAL MEDICINE

## 2019-08-06 PROCEDURE — 2700000000 HC OXYGEN THERAPY PER DAY

## 2019-08-06 PROCEDURE — 0B9G8ZX DRAINAGE OF LEFT UPPER LUNG LOBE, VIA NATURAL OR ARTIFICIAL OPENING ENDOSCOPIC, DIAGNOSTIC: ICD-10-PCS | Performed by: INTERNAL MEDICINE

## 2019-08-06 PROCEDURE — 94761 N-INVAS EAR/PLS OXIMETRY MLT: CPT

## 2019-08-06 PROCEDURE — 7100000010 HC PHASE II RECOVERY - FIRST 15 MIN: Performed by: INTERNAL MEDICINE

## 2019-08-06 PROCEDURE — 94640 AIRWAY INHALATION TREATMENT: CPT

## 2019-08-06 PROCEDURE — 36415 COLL VENOUS BLD VENIPUNCTURE: CPT

## 2019-08-06 PROCEDURE — 88305 TISSUE EXAM BY PATHOLOGIST: CPT

## 2019-08-06 PROCEDURE — 99152 MOD SED SAME PHYS/QHP 5/>YRS: CPT | Performed by: INTERNAL MEDICINE

## 2019-08-06 PROCEDURE — 31624 DX BRONCHOSCOPE/LAVAGE: CPT | Performed by: INTERNAL MEDICINE

## 2019-08-06 PROCEDURE — 87106 FUNGI IDENTIFICATION YEAST: CPT

## 2019-08-06 PROCEDURE — 85027 COMPLETE CBC AUTOMATED: CPT

## 2019-08-06 PROCEDURE — 6360000002 HC RX W HCPCS: Performed by: INTERNAL MEDICINE

## 2019-08-06 PROCEDURE — 2580000003 HC RX 258: Performed by: INTERNAL MEDICINE

## 2019-08-06 PROCEDURE — 87205 SMEAR GRAM STAIN: CPT

## 2019-08-06 PROCEDURE — 88112 CYTOPATH CELL ENHANCE TECH: CPT

## 2019-08-06 PROCEDURE — 87070 CULTURE OTHR SPECIMN AEROBIC: CPT

## 2019-08-06 PROCEDURE — 7100000011 HC PHASE II RECOVERY - ADDTL 15 MIN: Performed by: INTERNAL MEDICINE

## 2019-08-06 PROCEDURE — 89051 BODY FLUID CELL COUNT: CPT

## 2019-08-06 PROCEDURE — 87206 SMEAR FLUORESCENT/ACID STAI: CPT

## 2019-08-06 RX ORDER — IPRATROPIUM BROMIDE AND ALBUTEROL SULFATE 2.5; .5 MG/3ML; MG/3ML
3 SOLUTION RESPIRATORY (INHALATION)
Status: DISCONTINUED | OUTPATIENT
Start: 2019-08-06 | End: 2019-08-09 | Stop reason: HOSPADM

## 2019-08-06 RX ORDER — MIDAZOLAM HYDROCHLORIDE 5 MG/ML
INJECTION INTRAMUSCULAR; INTRAVENOUS PRN
Status: DISCONTINUED | OUTPATIENT
Start: 2019-08-06 | End: 2019-08-06 | Stop reason: ALTCHOICE

## 2019-08-06 RX ORDER — FENTANYL CITRATE 50 UG/ML
INJECTION, SOLUTION INTRAMUSCULAR; INTRAVENOUS PRN
Status: DISCONTINUED | OUTPATIENT
Start: 2019-08-06 | End: 2019-08-06 | Stop reason: ALTCHOICE

## 2019-08-06 RX ORDER — DEXTROSE MONOHYDRATE 50 MG/ML
100 INJECTION, SOLUTION INTRAVENOUS PRN
Status: DISCONTINUED | OUTPATIENT
Start: 2019-08-06 | End: 2019-08-09 | Stop reason: HOSPADM

## 2019-08-06 RX ORDER — NICOTINE POLACRILEX 4 MG
15 LOZENGE BUCCAL PRN
Status: DISCONTINUED | OUTPATIENT
Start: 2019-08-06 | End: 2019-08-09 | Stop reason: HOSPADM

## 2019-08-06 RX ORDER — DEXTROSE MONOHYDRATE 25 G/50ML
12.5 INJECTION, SOLUTION INTRAVENOUS PRN
Status: DISCONTINUED | OUTPATIENT
Start: 2019-08-06 | End: 2019-08-09 | Stop reason: HOSPADM

## 2019-08-06 RX ADMIN — Medication 10 ML: at 20:15

## 2019-08-06 RX ADMIN — Medication 10 ML: at 10:01

## 2019-08-06 RX ADMIN — NADOLOL 20 MG: 40 TABLET ORAL at 15:03

## 2019-08-06 RX ADMIN — IPRATROPIUM BROMIDE AND ALBUTEROL SULFATE 3 ML: .5; 3 SOLUTION RESPIRATORY (INHALATION) at 14:42

## 2019-08-06 RX ADMIN — ENOXAPARIN SODIUM 40 MG: 40 INJECTION SUBCUTANEOUS at 10:01

## 2019-08-06 RX ADMIN — IPRATROPIUM BROMIDE AND ALBUTEROL SULFATE 3 ML: .5; 3 SOLUTION RESPIRATORY (INHALATION) at 06:52

## 2019-08-06 RX ADMIN — MIRTAZAPINE 15 MG: 15 TABLET, FILM COATED ORAL at 20:15

## 2019-08-06 RX ADMIN — LORAZEPAM 0.5 MG: 0.5 TABLET ORAL at 18:47

## 2019-08-06 RX ADMIN — IPRATROPIUM BROMIDE AND ALBUTEROL SULFATE 3 ML: .5; 3 SOLUTION RESPIRATORY (INHALATION) at 10:40

## 2019-08-06 RX ADMIN — IPRATROPIUM BROMIDE AND ALBUTEROL SULFATE 3 ML: .5; 3 SOLUTION RESPIRATORY (INHALATION) at 22:21

## 2019-08-06 RX ADMIN — SERTRALINE 200 MG: 100 TABLET, FILM COATED ORAL at 15:03

## 2019-08-06 RX ADMIN — METFORMIN HYDROCHLORIDE 500 MG: 500 TABLET ORAL at 17:45

## 2019-08-06 RX ADMIN — ASPIRIN 81 MG 81 MG: 81 TABLET ORAL at 15:03

## 2019-08-06 RX ADMIN — IPRATROPIUM BROMIDE AND ALBUTEROL SULFATE 3 ML: .5; 3 SOLUTION RESPIRATORY (INHALATION) at 19:02

## 2019-08-06 RX ADMIN — AMITRIPTYLINE HYDROCHLORIDE 25 MG: 25 TABLET, FILM COATED ORAL at 20:15

## 2019-08-06 RX ADMIN — GUAIFENESIN AND DEXTROMETHORPHAN 5 ML: 100; 10 SYRUP ORAL at 18:47

## 2019-08-06 RX ADMIN — TRAZODONE HYDROCHLORIDE 100 MG: 100 TABLET ORAL at 20:15

## 2019-08-06 RX ADMIN — METHYLPREDNISOLONE SODIUM SUCCINATE 40 MG: 40 INJECTION, POWDER, FOR SOLUTION INTRAMUSCULAR; INTRAVENOUS at 10:19

## 2019-08-06 RX ADMIN — INSULIN LISPRO 2 UNITS: 100 INJECTION, SOLUTION INTRAVENOUS; SUBCUTANEOUS at 21:39

## 2019-08-06 ASSESSMENT — PAIN - FUNCTIONAL ASSESSMENT: PAIN_FUNCTIONAL_ASSESSMENT: 0-10

## 2019-08-06 ASSESSMENT — PAIN SCALES - GENERAL: PAINLEVEL_OUTOF10: 0

## 2019-08-06 NOTE — PROGRESS NOTES
Admit: 2019    Name:  Odette Peters  Room:  0207/0207-02  MRN:    [id number]     Daily Progress Note for 2019     Interval History:     Now on 5 L of O2    Scheduled Meds:   ipratropium-albuterol  3 mL Inhalation Q4H WA    sulfamethoxazole-trimethoprim  1 tablet Oral Every Other Day    aspirin  81 mg Oral Daily    nadolol  20 mg Oral Daily    amitriptyline  25 mg Oral Nightly    sertraline  200 mg Oral Daily    mirtazapine  15 mg Oral Nightly    sodium chloride flush  10 mL Intravenous 2 times per day    enoxaparin  40 mg Subcutaneous Daily    methylPREDNISolone  40 mg Intravenous Daily    levothyroxine  150 mcg Oral Daily       Continuous Infusions:      PRN Meds:  acetaminophen, traZODone, LORazepam, hydrOXYzine, albuterol, sodium chloride flush, magnesium hydroxide, ondansetron, guaiFENesin-dextromethorphan                  Objective:     Temp  Av.4 °F (36.3 °C)  Min: 97.2 °F (36.2 °C)  Max: 97.9 °F (36.6 °C)  Pulse  Av  Min: 77  Max: 88  BP  Min: 113/70  Max: 126/80  SpO2  Av.1 %  Min: 69 %  Max: 93 %  Patient Vitals for the past 4 hrs:   SpO2   19 0653 90 %         Intake/Output Summary (Last 24 hours) at 2019 0834  Last data filed at 2019 0844  Gross per 24 hour   Intake 480 ml   Output --   Net 480 ml       Physical Exam:  Gen: Chronically ill appearing female. No distress. Alert. Eyes: PERRL. No sclera icterus. No conjunctival injection. ENT: No discharge. Pharynx clear. Neck: Trachea midline. Resp: + increased WOB, + crackles, + faint wheezing, 5L NC O2   CV: Regular rate. Regular rhythm. No murmur. No rub. No edema. GI: Non-tender. Non-distended. No masses. No organomegaly. Normal bowel sounds. No hernia. Skin: Warm and dry. M/S: No cyanosis. No joint deformity. No clubbing. Neuro: Awake. Grossly nonfocal    Psych: Oriented x 3. No anxiety or agitation.      Lab Data:  CBC:   Recent Labs     19  1405 19  0532 19  0515

## 2019-08-06 NOTE — PROGRESS NOTES
N/A 6/27/2019    EBUS WF W/ANES. performed by Tessy Mckee MD at [address]  6/27/2019    BRONCHOSCOPY/TRANSBRONCHIAL NEEDLE BIOPSY performed by Tessy Mckee MD at [address]  6/27/2019    BRONCHOSCOPY/TRANSBRONCHIAL LUNG BIOPSY performed by Tessy Mckee MD at [address]  6/27/2019    BRONCHOSCOPY ALVEOLAR LAVAGE performed by Tessy Mckee MD at [address]  07/10/2019    BRONCHOSCOPY N/A 7/10/2019    BRONCHOSCOPY ALVEOLAR LAVAGE performed by Janet Jama MD at [address] [place] [address], TOTAL ABDOMINAL      partial       Level of Consciousness: Alert, Oriented, and Cooperative = 0    Level of Activity: Walking with assistance = 1    Respiratory Pattern: Dyspnea with exertion;Irregular pattern;or RR less than 6 = 2    Breath Sounds: Diminshed bilaterally and/or crackles = 2    Sputum  Sputum Color: Yellow, Green, Tenacity: Thick, Sputum How Obtained: Spontaneous cough  Cough: Strong, spontaneous, non-productive = 0    Vital Signs   /76   Pulse 85   Temp 97.2 °F (36.2 °C) (Oral)   Resp 20   Ht 5' 3\" (1.6 m)   Wt 201 lb 4.8 oz (91.3 kg)   SpO2 90%   BMI 35.66 kg/m²   SPO2 (COPD values may differ): 88-89% on room air or greater than 92% on FiO2 28- 35% = 2    Peak Flow (asthma only): not applicable = 0    RSI: 7-58 = TID (three times daily) and Q4hr PRN for dyspnea        Plan       Goals: mobilize retained secretions, volume expansion and improve oxygenation    Patient/caregiver was educated on the proper method of use for Respiratory Care Devices:  Yes      Level of patient/caregiver understanding able to:   ? Verbalize understanding   ? Demonstrate understanding       ? Teach back        ? Needs reinforcement       ? No available caregiver               ? Other:     Response to education:  Good     Is patient being placed on Home Treatment Regimen?   No     Does the patient have everything they need prior to discharge? NA     Comments: pt assessed and chart reviewed    Plan of Care: Preet Casper for sob, albuterol q6 prn    Electronically signed by Cherry Modi RCP on 8/6/2019 at 2:22 AM    Respiratory Protocol Guidelines     1. Assessment and treatment by Respiratory Therapy will be initiated for medication and therapeutic interventions upon initiation of aerosolized medication. 2. Physician will be contacted for respiratory rate (RR) greater than 35 breaths per minute. Therapy will be held for heart rate (HR) greater than 140 beats per minute, pending direction from physician. 3. Bronchodilators will be administered via Metered Dose Inhaler (MDI) with spacer when the following criteria are met:  a. Alert and cooperative     b. HR < 140 bpm  c. RR < 30 bpm                d. Can demonstrate a 2-3 second inspiratory hold  4. Bronchodilators will be administered via Hand Held Nebulizer [facility]) to patients when ANY of the following criteria are met  a. Incognizant or uncooperative          b. Patients treated with HHN at Home        c. Unable to demonstrate proper use of MDI with spacer     d. RR > 30 bpm   5. Bronchodilators will be delivered via Metered Dose Inhaler (MDI), HHN, Aerogen to intubated patients on mechanical ventilation. 6. Inhalation medication orders will be delivered and/or substituted as outlined below. Aerosolized Medications Ordering and Administration Guidelines:    1. All Medications will be ordered by a physician, and their frequency and/or modality will be adjusted as defined by the patients Respiratory Severity Index (RSI) score. 2. If the patient does not have documented COPD, consider discontinuing anticholinergics when RSI is less than 9.  3. If the bronchospasm worsens (increased RSI), then the bronchodilator frequency can be increased to a maximum of every 4 hours.   If greater than every 4 hours is required, the physician will be

## 2019-08-06 NOTE — PLAN OF CARE
Patient bronch completed today. Anxiety and respiratory status better this evening. Family at bedside.

## 2019-08-06 NOTE — PROGRESS NOTES
Assessment complete. Patient tachypneic this AM. Dr. Clarisa Kuo notified that patient is flagging for sepsis. RN to increase O2 to 6L. No changes or need for sepsis protocol at this time. Patient bathed and assisted to bedside commode. Call light within reach. Allergy bands, ID band, and tele intact. IV medications given per MAR. NPO for procedure. Denies any needs at this time. Will continue to monitor.

## 2019-08-06 NOTE — H&P
Fiberoptic bronchoscopy history:     Recurrent hypoxemic respiratory failure, abnormal CXR, need to r/o active infection    Patient is allergic to penicillins; cefuroxime; doxycycline; imitrex [sumatriptan]; meperidine; sulfa antibiotics; bactrim [sulfamethoxazole-trimethoprim]; keflex [cephalexin]; and tape [adhesive tape]. Past Medical History:   Diagnosis Date    Anxiety     COPD (chronic obstructive pulmonary disease) ([facility] [address]     Depression     Hyperlipidemia     Hypertension     Rash     Thyroid disease        Past Surgical History:   Procedure Laterality Date    BRONCHOSCOPY N/A 6/27/2019    EBUS WF W/ANES. performed by Kimi Rodríguez MD at [address] [place] [address]  6/27/2019    BRONCHOSCOPY/TRANSBRONCHIAL NEEDLE BIOPSY performed by Kimi Rodríguez MD at [address] [place] [address]  6/27/2019    BRONCHOSCOPY/TRANSBRONCHIAL LUNG BIOPSY performed by Kimi Rodríguez MD at [address] [place] [address]  6/27/2019    BRONCHOSCOPY ALVEOLAR LAVAGE performed by Kimi Rodríguez MD at [address] [place] [address]  07/10/2019    BRONCHOSCOPY N/A 7/10/2019    BRONCHOSCOPY ALVEOLAR LAVAGE performed by Korin Maradiaga MD at [address] [place] [address], TOTAL ABDOMINAL      partial       Allergies   Allergen Reactions    Penicillins Anaphylaxis     Tolerates Meropenem.  Cefuroxime      Tolerates Meropenem.  Doxycycline Hives    Imitrex [Sumatriptan] Other (See Comments)     Feels like pins and needles    Meperidine     Sulfa Antibiotics Hives    Bactrim [Sulfamethoxazole-Trimethoprim] Rash    Keflex [Cephalexin] Itching and Rash     Tolerates Meropenem.  Tape Samara Sides Tape] Rash       No current facility-administered medications on file prior to encounter.       Current Outpatient Medications on File Prior to Encounter   Medication Sig Dispense Refill    sulfamethoxazole-trimethoprim (BACTRIM DS;SEPTRA DS) 800-160 MG per tablet 1 tablet M-W-F
Continue BB-- held 2/2 hypotension   - Rate controlled     Hypothyroidism  - Continue Synthroid     Morbid Obesity  - Body mass index is 35.66 kg/m². - Complicating assessment and treatment. Placing patient at risk for multiple co-morbidities as well as early death and contributing to the patient's presentation.   - Counseled on weight loss. DVT Prophylaxis: lovenox  Diet: DIET GENERAL;   Code Status: Full Code    Yazmin Lal PA-C  8/5/2019 8:06 AM   [facility] Concepcion.

## 2019-08-07 LAB
ANION GAP SERPL CALCULATED.3IONS-SCNC: 13 MMOL/L (ref 3–16)
BASOPHILS ABSOLUTE: 0.1 K/UL (ref 0–0.2)
BASOPHILS RELATIVE PERCENT: 0.5 %
BUN BLDV-MCNC: 18 MG/DL (ref 7–20)
CALCIUM SERPL-MCNC: 9.1 MG/DL (ref 8.3–10.6)
CHLORIDE BLD-SCNC: 99 MMOL/L (ref 99–110)
CO2: 26 MMOL/L (ref 21–32)
CREAT SERPL-MCNC: 0.6 MG/DL (ref 0.6–1.2)
EOSINOPHILS ABSOLUTE: 0.3 K/UL (ref 0–0.6)
EOSINOPHILS RELATIVE PERCENT: 2.2 %
GFR AFRICAN AMERICAN: >60
GFR NON-AFRICAN AMERICAN: >60
GLUCOSE BLD-MCNC: 151 MG/DL (ref 70–99)
GLUCOSE BLD-MCNC: 178 MG/DL (ref 70–99)
GLUCOSE BLD-MCNC: 183 MG/DL (ref 70–99)
GLUCOSE BLD-MCNC: 294 MG/DL (ref 70–99)
GLUCOSE BLD-MCNC: 342 MG/DL (ref 70–99)
GLUCOSE BLD-MCNC: 373 MG/DL (ref 70–99)
HCT VFR BLD CALC: 35.6 % (ref 36–48)
HEMOGLOBIN: 11.4 G/DL (ref 12–16)
LYMPHOCYTES ABSOLUTE: 2.1 K/UL (ref 1–5.1)
LYMPHOCYTES RELATIVE PERCENT: 17.3 %
MAGNESIUM: 1.7 MG/DL (ref 1.8–2.4)
MCH RBC QN AUTO: 26 PG (ref 26–34)
MCHC RBC AUTO-ENTMCNC: 32 G/DL (ref 31–36)
MCV RBC AUTO: 81.3 FL (ref 80–100)
MONOCYTES ABSOLUTE: 0.6 K/UL (ref 0–1.3)
MONOCYTES RELATIVE PERCENT: 4.7 %
NEUTROPHILS ABSOLUTE: 9 K/UL (ref 1.7–7.7)
NEUTROPHILS RELATIVE PERCENT: 75.3 %
PDW BLD-RTO: 16.7 % (ref 12.4–15.4)
PERFORMED ON: ABNORMAL
PLATELET # BLD: 174 K/UL (ref 135–450)
PMV BLD AUTO: 7.1 FL (ref 5–10.5)
POTASSIUM REFLEX MAGNESIUM: 3.5 MMOL/L (ref 3.5–5.1)
RBC # BLD: 4.38 M/UL (ref 4–5.2)
SODIUM BLD-SCNC: 138 MMOL/L (ref 136–145)
WBC # BLD: 11.9 K/UL (ref 4–11)

## 2019-08-07 PROCEDURE — 6370000000 HC RX 637 (ALT 250 FOR IP): Performed by: INTERNAL MEDICINE

## 2019-08-07 PROCEDURE — 36415 COLL VENOUS BLD VENIPUNCTURE: CPT

## 2019-08-07 PROCEDURE — 6360000002 HC RX W HCPCS: Performed by: INTERNAL MEDICINE

## 2019-08-07 PROCEDURE — 94669 MECHANICAL CHEST WALL OSCILL: CPT

## 2019-08-07 PROCEDURE — 2580000003 HC RX 258: Performed by: INTERNAL MEDICINE

## 2019-08-07 PROCEDURE — 2700000000 HC OXYGEN THERAPY PER DAY

## 2019-08-07 PROCEDURE — 6370000000 HC RX 637 (ALT 250 FOR IP): Performed by: PHYSICIAN ASSISTANT

## 2019-08-07 PROCEDURE — 99232 SBSQ HOSP IP/OBS MODERATE 35: CPT | Performed by: INTERNAL MEDICINE

## 2019-08-07 PROCEDURE — 85025 COMPLETE CBC W/AUTO DIFF WBC: CPT

## 2019-08-07 PROCEDURE — 1200000000 HC SEMI PRIVATE

## 2019-08-07 PROCEDURE — 83735 ASSAY OF MAGNESIUM: CPT

## 2019-08-07 PROCEDURE — 94761 N-INVAS EAR/PLS OXIMETRY MLT: CPT

## 2019-08-07 PROCEDURE — 80048 BASIC METABOLIC PNL TOTAL CA: CPT

## 2019-08-07 PROCEDURE — 94640 AIRWAY INHALATION TREATMENT: CPT

## 2019-08-07 RX ORDER — FUROSEMIDE 10 MG/ML
20 INJECTION INTRAMUSCULAR; INTRAVENOUS ONCE
Status: COMPLETED | OUTPATIENT
Start: 2019-08-07 | End: 2019-08-07

## 2019-08-07 RX ADMIN — IPRATROPIUM BROMIDE AND ALBUTEROL SULFATE 3 ML: .5; 3 SOLUTION RESPIRATORY (INHALATION) at 15:11

## 2019-08-07 RX ADMIN — GUAIFENESIN AND DEXTROMETHORPHAN 5 ML: 100; 10 SYRUP ORAL at 19:50

## 2019-08-07 RX ADMIN — FUROSEMIDE 20 MG: 10 INJECTION, SOLUTION INTRAMUSCULAR; INTRAVENOUS at 11:06

## 2019-08-07 RX ADMIN — METHYLPREDNISOLONE SODIUM SUCCINATE 40 MG: 40 INJECTION, POWDER, FOR SOLUTION INTRAMUSCULAR; INTRAVENOUS at 07:59

## 2019-08-07 RX ADMIN — Medication 10 ML: at 19:50

## 2019-08-07 RX ADMIN — INSULIN LISPRO 1 UNITS: 100 INJECTION, SOLUTION INTRAVENOUS; SUBCUTANEOUS at 07:59

## 2019-08-07 RX ADMIN — IPRATROPIUM BROMIDE AND ALBUTEROL SULFATE 3 ML: .5; 3 SOLUTION RESPIRATORY (INHALATION) at 06:53

## 2019-08-07 RX ADMIN — METFORMIN HYDROCHLORIDE 500 MG: 500 TABLET ORAL at 07:57

## 2019-08-07 RX ADMIN — Medication 10 ML: at 07:58

## 2019-08-07 RX ADMIN — Medication 10 ML: at 11:07

## 2019-08-07 RX ADMIN — GUAIFENESIN AND DEXTROMETHORPHAN 5 ML: 100; 10 SYRUP ORAL at 05:03

## 2019-08-07 RX ADMIN — METFORMIN HYDROCHLORIDE 500 MG: 500 TABLET ORAL at 17:01

## 2019-08-07 RX ADMIN — IPRATROPIUM BROMIDE AND ALBUTEROL SULFATE 3 ML: .5; 3 SOLUTION RESPIRATORY (INHALATION) at 10:57

## 2019-08-07 RX ADMIN — MIRTAZAPINE 15 MG: 15 TABLET, FILM COATED ORAL at 19:50

## 2019-08-07 RX ADMIN — SERTRALINE 200 MG: 100 TABLET, FILM COATED ORAL at 07:57

## 2019-08-07 RX ADMIN — LORAZEPAM 0.5 MG: 0.5 TABLET ORAL at 19:50

## 2019-08-07 RX ADMIN — ASPIRIN 81 MG 81 MG: 81 TABLET ORAL at 07:57

## 2019-08-07 RX ADMIN — SULFAMETHOXAZOLE AND TRIMETHOPRIM 1 TABLET: 800; 160 TABLET ORAL at 07:57

## 2019-08-07 RX ADMIN — GUAIFENESIN AND DEXTROMETHORPHAN 5 ML: 100; 10 SYRUP ORAL at 13:12

## 2019-08-07 RX ADMIN — INSULIN LISPRO 4 UNITS: 100 INJECTION, SOLUTION INTRAVENOUS; SUBCUTANEOUS at 11:13

## 2019-08-07 RX ADMIN — ENOXAPARIN SODIUM 40 MG: 40 INJECTION SUBCUTANEOUS at 07:58

## 2019-08-07 RX ADMIN — ACETAMINOPHEN 500 MG: 500 TABLET ORAL at 05:03

## 2019-08-07 RX ADMIN — IPRATROPIUM BROMIDE AND ALBUTEROL SULFATE 3 ML: .5; 3 SOLUTION RESPIRATORY (INHALATION) at 18:55

## 2019-08-07 RX ADMIN — IPRATROPIUM BROMIDE AND ALBUTEROL SULFATE 3 ML: .5; 3 SOLUTION RESPIRATORY (INHALATION) at 22:29

## 2019-08-07 RX ADMIN — INSULIN LISPRO 1 UNITS: 100 INJECTION, SOLUTION INTRAVENOUS; SUBCUTANEOUS at 19:55

## 2019-08-07 RX ADMIN — LEVOTHYROXINE SODIUM 150 MCG: 150 TABLET ORAL at 05:07

## 2019-08-07 RX ADMIN — AMITRIPTYLINE HYDROCHLORIDE 25 MG: 25 TABLET, FILM COATED ORAL at 19:50

## 2019-08-07 RX ADMIN — INSULIN LISPRO 5 UNITS: 100 INJECTION, SOLUTION INTRAVENOUS; SUBCUTANEOUS at 16:56

## 2019-08-07 ASSESSMENT — PAIN SCALES - GENERAL
PAINLEVEL_OUTOF10: 0
PAINLEVEL_OUTOF10: 9
PAINLEVEL_OUTOF10: 0

## 2019-08-07 ASSESSMENT — PAIN DESCRIPTION - PAIN TYPE: TYPE: ACUTE PAIN

## 2019-08-07 ASSESSMENT — PAIN DESCRIPTION - DESCRIPTORS: DESCRIPTORS: HEADACHE

## 2019-08-07 ASSESSMENT — PAIN DESCRIPTION - FREQUENCY: FREQUENCY: INTERMITTENT

## 2019-08-07 ASSESSMENT — PAIN DESCRIPTION - LOCATION: LOCATION: HEAD

## 2019-08-07 NOTE — FLOWSHEET NOTE
08/07/19 0745   Vital Signs   Temp 97.5 °F (36.4 °C)   Temp Source Oral   Pulse 70   Heart Rate Source Monitor   Resp 20   BP (!) 101/59   BP Location Right upper arm   BP Upper/Lower Upper   Patient Position Semi fowlers   Level of Consciousness 0   MEWS Score 1   Pain Assessment   Pain Assessment 0-10   Pain Level 0   Oxygen Therapy   SpO2 97 %   O2 Device Nasal cannula   O2 Flow Rate (L/min) 5 L/min   Pt resting in bed, AM assessment complete. Denies needs. Call light in reach.

## 2019-08-08 LAB
ANION GAP SERPL CALCULATED.3IONS-SCNC: 15 MMOL/L (ref 3–16)
BUN BLDV-MCNC: 16 MG/DL (ref 7–20)
CALCIUM SERPL-MCNC: 9.1 MG/DL (ref 8.3–10.6)
CHLORIDE BLD-SCNC: 98 MMOL/L (ref 99–110)
CO2: 25 MMOL/L (ref 21–32)
CREAT SERPL-MCNC: <0.5 MG/DL (ref 0.6–1.2)
CULTURE, RESPIRATORY: NORMAL
CULTURE, RESPIRATORY: NORMAL
GFR AFRICAN AMERICAN: >60
GFR NON-AFRICAN AMERICAN: >60
GLUCOSE BLD-MCNC: 140 MG/DL (ref 70–99)
GLUCOSE BLD-MCNC: 156 MG/DL (ref 70–99)
GLUCOSE BLD-MCNC: 232 MG/DL (ref 70–99)
GLUCOSE BLD-MCNC: 253 MG/DL (ref 70–99)
GLUCOSE BLD-MCNC: 347 MG/DL (ref 70–99)
GRAM STAIN RESULT: NORMAL
GRAM STAIN RESULT: NORMAL
IRON SATURATION: 8 % (ref 15–50)
IRON: 28 UG/DL (ref 37–145)
MAGNESIUM: 1.7 MG/DL (ref 1.8–2.4)
PERFORMED ON: ABNORMAL
POTASSIUM REFLEX MAGNESIUM: 3.2 MMOL/L (ref 3.5–5.1)
SODIUM BLD-SCNC: 138 MMOL/L (ref 136–145)
TOTAL IRON BINDING CAPACITY: 355 UG/DL (ref 260–445)

## 2019-08-08 PROCEDURE — 83550 IRON BINDING TEST: CPT

## 2019-08-08 PROCEDURE — 99223 1ST HOSP IP/OBS HIGH 75: CPT | Performed by: INTERNAL MEDICINE

## 2019-08-08 PROCEDURE — 86334 IMMUNOFIX E-PHORESIS SERUM: CPT

## 2019-08-08 PROCEDURE — 83540 ASSAY OF IRON: CPT

## 2019-08-08 PROCEDURE — 2580000003 HC RX 258: Performed by: INTERNAL MEDICINE

## 2019-08-08 PROCEDURE — 36415 COLL VENOUS BLD VENIPUNCTURE: CPT

## 2019-08-08 PROCEDURE — 6370000000 HC RX 637 (ALT 250 FOR IP): Performed by: INTERNAL MEDICINE

## 2019-08-08 PROCEDURE — 83735 ASSAY OF MAGNESIUM: CPT

## 2019-08-08 PROCEDURE — 86335 IMMUNFIX E-PHORSIS/URINE/CSF: CPT

## 2019-08-08 PROCEDURE — 80048 BASIC METABOLIC PNL TOTAL CA: CPT

## 2019-08-08 PROCEDURE — 99232 SBSQ HOSP IP/OBS MODERATE 35: CPT | Performed by: INTERNAL MEDICINE

## 2019-08-08 PROCEDURE — 84155 ASSAY OF PROTEIN SERUM: CPT

## 2019-08-08 PROCEDURE — 94761 N-INVAS EAR/PLS OXIMETRY MLT: CPT

## 2019-08-08 PROCEDURE — 1200000000 HC SEMI PRIVATE

## 2019-08-08 PROCEDURE — 94669 MECHANICAL CHEST WALL OSCILL: CPT

## 2019-08-08 PROCEDURE — 84156 ASSAY OF PROTEIN URINE: CPT

## 2019-08-08 PROCEDURE — 84166 PROTEIN E-PHORESIS/URINE/CSF: CPT

## 2019-08-08 PROCEDURE — 2700000000 HC OXYGEN THERAPY PER DAY

## 2019-08-08 PROCEDURE — 84165 PROTEIN E-PHORESIS SERUM: CPT

## 2019-08-08 PROCEDURE — 6360000002 HC RX W HCPCS: Performed by: INTERNAL MEDICINE

## 2019-08-08 PROCEDURE — 94640 AIRWAY INHALATION TREATMENT: CPT

## 2019-08-08 RX ORDER — LORAZEPAM 0.5 MG/1
0.5 TABLET ORAL 2 TIMES DAILY PRN
Status: CANCELLED | OUTPATIENT
Start: 2019-08-08

## 2019-08-08 RX ORDER — FUROSEMIDE 40 MG/1
40 TABLET ORAL DAILY
Status: DISCONTINUED | OUTPATIENT
Start: 2019-08-08 | End: 2019-08-09 | Stop reason: HOSPADM

## 2019-08-08 RX ORDER — ACETAMINOPHEN 500 MG
500 TABLET ORAL EVERY 6 HOURS PRN
Status: CANCELLED | OUTPATIENT
Start: 2019-08-08

## 2019-08-08 RX ORDER — SODIUM CHLORIDE 0.9 % (FLUSH) 0.9 %
10 SYRINGE (ML) INJECTION EVERY 12 HOURS SCHEDULED
Status: CANCELLED | OUTPATIENT
Start: 2019-08-08

## 2019-08-08 RX ORDER — SODIUM CHLORIDE 0.9 % (FLUSH) 0.9 %
10 SYRINGE (ML) INJECTION PRN
Status: CANCELLED | OUTPATIENT
Start: 2019-08-08

## 2019-08-08 RX ORDER — SULFAMETHOXAZOLE AND TRIMETHOPRIM 800; 160 MG/1; MG/1
1 TABLET ORAL EVERY OTHER DAY
Status: CANCELLED | OUTPATIENT
Start: 2019-08-09

## 2019-08-08 RX ORDER — GUAIFENESIN/DEXTROMETHORPHAN 100-10MG/5
5 SYRUP ORAL EVERY 6 HOURS PRN
Status: CANCELLED | OUTPATIENT
Start: 2019-08-08

## 2019-08-08 RX ORDER — ALBUTEROL SULFATE 2.5 MG/3ML
2.5 SOLUTION RESPIRATORY (INHALATION) EVERY 6 HOURS PRN
Status: CANCELLED | OUTPATIENT
Start: 2019-08-08

## 2019-08-08 RX ORDER — DEXTROSE MONOHYDRATE 50 MG/ML
100 INJECTION, SOLUTION INTRAVENOUS PRN
Status: CANCELLED | OUTPATIENT
Start: 2019-08-08

## 2019-08-08 RX ORDER — HYDROXYZINE HYDROCHLORIDE 10 MG/1
10 TABLET, FILM COATED ORAL 3 TIMES DAILY PRN
Status: CANCELLED | OUTPATIENT
Start: 2019-08-08

## 2019-08-08 RX ORDER — SERTRALINE HYDROCHLORIDE 100 MG/1
200 TABLET, FILM COATED ORAL DAILY
Status: CANCELLED | OUTPATIENT
Start: 2019-08-09

## 2019-08-08 RX ORDER — NADOLOL 40 MG/1
20 TABLET ORAL DAILY
Status: CANCELLED | OUTPATIENT
Start: 2019-08-09

## 2019-08-08 RX ORDER — FUROSEMIDE 40 MG/1
40 TABLET ORAL DAILY
Status: CANCELLED | OUTPATIENT
Start: 2019-08-09

## 2019-08-08 RX ORDER — DEXTROSE MONOHYDRATE 25 G/50ML
12.5 INJECTION, SOLUTION INTRAVENOUS PRN
Status: CANCELLED | OUTPATIENT
Start: 2019-08-08

## 2019-08-08 RX ORDER — TRAZODONE HYDROCHLORIDE 100 MG/1
100 TABLET ORAL NIGHTLY PRN
Status: CANCELLED | OUTPATIENT
Start: 2019-08-08

## 2019-08-08 RX ORDER — ONDANSETRON 2 MG/ML
4 INJECTION INTRAMUSCULAR; INTRAVENOUS EVERY 6 HOURS PRN
Status: CANCELLED | OUTPATIENT
Start: 2019-08-08

## 2019-08-08 RX ORDER — AMITRIPTYLINE HYDROCHLORIDE 25 MG/1
25 TABLET, FILM COATED ORAL NIGHTLY
Status: CANCELLED | OUTPATIENT
Start: 2019-08-08

## 2019-08-08 RX ORDER — ASPIRIN 81 MG/1
81 TABLET, CHEWABLE ORAL DAILY
Status: CANCELLED | OUTPATIENT
Start: 2019-08-09

## 2019-08-08 RX ORDER — NICOTINE POLACRILEX 4 MG
15 LOZENGE BUCCAL PRN
Status: CANCELLED | OUTPATIENT
Start: 2019-08-08

## 2019-08-08 RX ORDER — MIRTAZAPINE 15 MG/1
15 TABLET, FILM COATED ORAL NIGHTLY
Status: CANCELLED | OUTPATIENT
Start: 2019-08-08

## 2019-08-08 RX ORDER — LEVOTHYROXINE SODIUM 0.15 MG/1
150 TABLET ORAL DAILY
Status: CANCELLED | OUTPATIENT
Start: 2019-08-09

## 2019-08-08 RX ORDER — IPRATROPIUM BROMIDE AND ALBUTEROL SULFATE 2.5; .5 MG/3ML; MG/3ML
3 SOLUTION RESPIRATORY (INHALATION)
Status: CANCELLED | OUTPATIENT
Start: 2019-08-08

## 2019-08-08 RX ORDER — METHYLPREDNISOLONE SODIUM SUCCINATE 40 MG/ML
40 INJECTION, POWDER, LYOPHILIZED, FOR SOLUTION INTRAMUSCULAR; INTRAVENOUS DAILY
Status: CANCELLED | OUTPATIENT
Start: 2019-08-09

## 2019-08-08 RX ADMIN — METFORMIN HYDROCHLORIDE 500 MG: 500 TABLET ORAL at 08:15

## 2019-08-08 RX ADMIN — INSULIN LISPRO 2 UNITS: 100 INJECTION, SOLUTION INTRAVENOUS; SUBCUTANEOUS at 12:10

## 2019-08-08 RX ADMIN — IPRATROPIUM BROMIDE AND ALBUTEROL SULFATE 3 ML: .5; 3 SOLUTION RESPIRATORY (INHALATION) at 11:40

## 2019-08-08 RX ADMIN — Medication 10 ML: at 20:21

## 2019-08-08 RX ADMIN — INSULIN LISPRO 4 UNITS: 100 INJECTION, SOLUTION INTRAVENOUS; SUBCUTANEOUS at 16:48

## 2019-08-08 RX ADMIN — NADOLOL 20 MG: 40 TABLET ORAL at 08:16

## 2019-08-08 RX ADMIN — IPRATROPIUM BROMIDE AND ALBUTEROL SULFATE 3 ML: .5; 3 SOLUTION RESPIRATORY (INHALATION) at 07:04

## 2019-08-08 RX ADMIN — TRAZODONE HYDROCHLORIDE 100 MG: 100 TABLET ORAL at 20:19

## 2019-08-08 RX ADMIN — INSULIN LISPRO 1 UNITS: 100 INJECTION, SOLUTION INTRAVENOUS; SUBCUTANEOUS at 08:17

## 2019-08-08 RX ADMIN — FUROSEMIDE 40 MG: 40 TABLET ORAL at 10:53

## 2019-08-08 RX ADMIN — MIRTAZAPINE 15 MG: 15 TABLET, FILM COATED ORAL at 20:19

## 2019-08-08 RX ADMIN — TRAZODONE HYDROCHLORIDE 100 MG: 100 TABLET ORAL at 02:04

## 2019-08-08 RX ADMIN — ACETAMINOPHEN 500 MG: 500 TABLET ORAL at 02:04

## 2019-08-08 RX ADMIN — Medication 10 ML: at 08:17

## 2019-08-08 RX ADMIN — LEVOTHYROXINE SODIUM 150 MCG: 150 TABLET ORAL at 06:04

## 2019-08-08 RX ADMIN — ACETAMINOPHEN 500 MG: 500 TABLET ORAL at 18:24

## 2019-08-08 RX ADMIN — METHYLPREDNISOLONE SODIUM SUCCINATE 40 MG: 40 INJECTION, POWDER, FOR SOLUTION INTRAMUSCULAR; INTRAVENOUS at 08:16

## 2019-08-08 RX ADMIN — AMITRIPTYLINE HYDROCHLORIDE 25 MG: 25 TABLET, FILM COATED ORAL at 20:19

## 2019-08-08 RX ADMIN — ENOXAPARIN SODIUM 40 MG: 40 INJECTION SUBCUTANEOUS at 08:16

## 2019-08-08 RX ADMIN — ACETAMINOPHEN 500 MG: 500 TABLET ORAL at 10:53

## 2019-08-08 RX ADMIN — ASPIRIN 81 MG 81 MG: 81 TABLET ORAL at 08:15

## 2019-08-08 RX ADMIN — IPRATROPIUM BROMIDE AND ALBUTEROL SULFATE 3 ML: .5; 3 SOLUTION RESPIRATORY (INHALATION) at 19:15

## 2019-08-08 RX ADMIN — SERTRALINE 200 MG: 100 TABLET, FILM COATED ORAL at 08:15

## 2019-08-08 RX ADMIN — GUAIFENESIN AND DEXTROMETHORPHAN 5 ML: 100; 10 SYRUP ORAL at 18:24

## 2019-08-08 RX ADMIN — IPRATROPIUM BROMIDE AND ALBUTEROL SULFATE 3 ML: .5; 3 SOLUTION RESPIRATORY (INHALATION) at 16:02

## 2019-08-08 RX ADMIN — IPRATROPIUM BROMIDE AND ALBUTEROL SULFATE 3 ML: .5; 3 SOLUTION RESPIRATORY (INHALATION) at 23:19

## 2019-08-08 RX ADMIN — INSULIN LISPRO 2 UNITS: 100 INJECTION, SOLUTION INTRAVENOUS; SUBCUTANEOUS at 20:20

## 2019-08-08 ASSESSMENT — PAIN SCALES - GENERAL
PAINLEVEL_OUTOF10: 0
PAINLEVEL_OUTOF10: 4
PAINLEVEL_OUTOF10: 5
PAINLEVEL_OUTOF10: 5

## 2019-08-08 ASSESSMENT — PAIN DESCRIPTION - LOCATION: LOCATION: HEAD

## 2019-08-08 ASSESSMENT — PAIN DESCRIPTION - PAIN TYPE: TYPE: ACUTE PAIN

## 2019-08-08 ASSESSMENT — PAIN DESCRIPTION - DESCRIPTORS: DESCRIPTORS: HEADACHE

## 2019-08-08 NOTE — PROGRESS NOTES
Pt sustaining 95% on 8L and no longer coughing so bumped oxygen back down to 5L at this time. Will monitor.

## 2019-08-08 NOTE — CONSULTS
MD Travon   predniSONE (DELTASONE) 10 MG tablet 40mg for 1 more week, 30mg for 2 weeks, 20mg for 2 weeks, 10 mg for 2 weeks 7/17/19  Yes Rodrigo Rendon MD   albuterol (PROVENTIL) (2.5 MG/3ML) 0.083% nebulizer solution Take 3 mLs by nebulization every 6 hours as needed for Wheezing 7/17/19  Yes Rodrigo Rendon MD   albuterol sulfate HFA (PROVENTIL HFA) 108 (90 Base) MCG/ACT inhaler Inhale 2 puffs into the lungs every 6 hours as needed for Wheezing 7/17/19  Yes Rodrigo Rendon MD   furosemide (LASIX) 40 MG tablet Take 1 tablet by mouth daily 7/15/19  Yes Magui De Leon MD   potassium chloride (KLOR-CON M) 20 MEQ extended release tablet Take 1 tablet by mouth daily 7/15/19  Yes Magui De Leon MD   OXYGEN Inhale 3 L into the lungs 7/14/19  Yes Magui De Leon MD   LORazepam (ATIVAN) 0.5 MG tablet Take 0.5 mg by mouth every 6 hours as needed for Anxiety.    Yes Historical Provider, MD   Azelastine-Fluticasone (DYMISTA) 137-50 MCG/ACT SUSP by Nasal route   Yes Historical Provider, MD   Esomeprazole Magnesium (NEXIUM 24HR PO) Take by mouth   Yes Historical Provider, MD   hydrOXYzine (ATARAX) 10 MG tablet Take 10 mg by mouth 3 times daily as needed for Itching   Yes Historical Provider, MD   mirtazapine (REMERON) 30 MG tablet TAKE 1 TABLET BY MOUTH EVERY DAY AT NIGHT 6/14/19  Yes Yordy Bermudez MD   ipratropium-albuterol (DUONEB) 0.5-2.5 (3) MG/3ML SOLN nebulizer solution Inhale 3 mLs into the lungs every 6 hours as needed for Shortness of Breath 6/8/19  Yes Orlando Poole MD   albuterol sulfate  (90 Base) MCG/ACT inhaler Inhale 2 puffs into the lungs 4 times daily as needed for Wheezing 5/30/19  Yes Lissy Alvarado MD   levothyroxine (SYNTHROID) 150 MCG tablet TAKE 1 TABLET BY MOUTH EVERY DAY 5/2/19  Yes Yordy Bermudez MD   nadolol (CORGARD) 20 MG tablet TAKE 2 TABLETS BY MOUTH EVERY DAY  Patient taking differently: nightly TAKE 2 TABLETS BY MOUTH EVERY DAY 5/2/19  Yes Yordy Bermudez MD   amitriptyline
No murmur or rub. No edema. Peripheral pulses are 2+. Capillary refill is less than 3 seconds. GI: Non-tender. Non-distended. No hernia. Skin: Warm and dry. No nodule on exposed extremities. Lymph: No cervical LAD. No supraclavicular LAD. M/S: No cyanosis. No joint deformity. No clubbing. Neuro: Awake. Alert. Moves all four extremities. Psych: Oriented x 3. No anxiety. LABS:  CBC:   Recent Labs     08/04/19  1405 08/05/19  0532   WBC 20.2* 18.4*   HGB 12.5 11.3*   HCT 39.0 35.3*   MCV 81.3 81.5    219     BMP:   Recent Labs     08/04/19  1405 08/05/19  0532    132*   K 4.3 4.0    96*   CO2 21 21   BUN 27* 21*   CREATININE 0.6 0.6     LIVER PROFILE:   Recent Labs     08/04/19  1405   AST 48*   ALT 23   BILITOT 0.5   ALKPHOS 114     PT/INR: No results for input(s): PROTIME, INR in the last 72 hours. APTT: No results for input(s): APTT in the last 72 hours. UA:No results for input(s): NITRITE, COLORU, PHUR, LABCAST, WBCUA, RBCUA, MUCUS, TRICHOMONAS, YEAST, BACTERIA, CLARITYU, SPECGRAV, LEUKOCYTESUR, UROBILINOGEN, BILIRUBINUR, BLOODU, GLUCOSEU, AMORPHOUS in the last 72 hours. Invalid input(s): KETONESU  No results for input(s): PHART, SQY3ORO, PO2ART in the last 72 hours. Chest imaging was reviewed by me and showed   CXR 8/4/19 interstitial opacities    ASSESSMENT:  ·  Acute on chronic hypoxemic respiratory failure, increase to 4 L from baseline 2 L  · Abnormal CT Chest: ILD v pulmonary edema v infection  · Atrial fibrillation   · Diastolic heart failure, but appears well compensated    PLAN:  Supplemental oxygen to maintain SaO2 >92%; wean as tolerated    IV solumedrol    Inhaled bronchodilators   Outpatient bactrim is continued. Fiberoptic bronchoscopy tomorrow, rule out active infection  May require [facility] to rule out pulmonary edema  Dr. Cindy Gu is considering referral for lung biopsy.     The risks and benefits of fiberoptic bronchoscopy were specifically discussed, including

## 2019-08-08 NOTE — PROGRESS NOTES
Consult has been called to Dr. Juanjo Chin on 8/8/19. Spoke with edgar.  12:14 PM    Sesar Ashley  8/8/2019

## 2019-08-08 NOTE — PROGRESS NOTES
Normal bowel sounds. No hernia. Skin: Warm and dry. M/S: No cyanosis. No joint deformity. No clubbing. Neuro: Awake. Grossly nonfocal    Psych: Oriented x 3. No anxiety or agitation. Lab Data:  CBC:   Recent Labs     08/06/19  0515 08/07/19  0951   WBC 16.1* 11.9*   RBC 4.06 4.38   HGB 10.5* 11.4*   HCT 33.2* 35.6*   MCV 81.8 81.3   RDW 16.8* 16.7*    174     BMP:   Recent Labs     08/07/19  0951 08/08/19  0549    138   K 3.5 3.2*   CL 99 98*   CO2 26 25   BUN 18 16   CREATININE 0.6 <0.5*     BNP: No results for input(s): BNP in the last 72 hours. PT/INR: No results for input(s): PROTIME, INR in the last 72 hours. APTT:No results for input(s): APTT in the last 72 hours. CARDIAC ENZYMES:   No results for input(s): CKMB, CKMBINDEX, TROPONINI in the last 72 hours. Invalid input(s): CKTOTAL;3  FASTING LIPID PANEL:  Lab Results   Component Value Date    CHOL 185 05/25/2018    HDL 45 05/25/2018    TRIG 137 05/25/2018     LIVER PROFILE:   No results for input(s): AST, ALT, ALB, BILIDIR, BILITOT, ALKPHOS in the last 72 hours.       EKG:  I have reviewed the EKG with the following interpretation:   NSR, normal axis, normal interval, Q waves in lead III, no acute ST segment changes concerning for acute ischemia         RADIOLOGY  XR CHEST STANDARD (2 VW)   Final Result   Stable chest.  Streaky interstitial changes throughout the lungs with no   superimposed acute infiltrate or significant pleural fluid.          Assessment & Plan:     Patient Active Problem List    Diagnosis Date Noted    COPD exacerbation ([facility] 08/04/2019    COPD (chronic obstructive pulmonary disease) ([facility] 08/04/2019    Acute cystitis without hematuria     Acute hypoxemic respiratory failure (HCC)     Abnormal chest x-ray     Abnormal CT of the chest     Dyspnea and respiratory abnormalities     Acute respiratory failure with hypoxia (HCC)     Restrictive lung disease     Abnormal chest CT     Fever     Acute

## 2019-08-09 ENCOUNTER — APPOINTMENT (OUTPATIENT)
Dept: CT IMAGING | Age: 64
DRG: 189 | End: 2019-08-09
Attending: INTERNAL MEDICINE
Payer: COMMERCIAL

## 2019-08-09 ENCOUNTER — TELEPHONE (OUTPATIENT)
Dept: PULMONOLOGY | Age: 64
End: 2019-08-09

## 2019-08-09 ENCOUNTER — HOSPITAL ENCOUNTER (INPATIENT)
Dept: CARDIAC CATH/INVASIVE PROCEDURES | Age: 64
LOS: 1 days | Discharge: HOME HEALTH | DRG: 189 | End: 2019-08-10
Attending: INTERNAL MEDICINE | Admitting: INTERNAL MEDICINE
Payer: COMMERCIAL

## 2019-08-09 VITALS
HEIGHT: 63 IN | HEART RATE: 88 BPM | BODY MASS INDEX: 35.67 KG/M2 | WEIGHT: 201.3 LBS | DIASTOLIC BLOOD PRESSURE: 77 MMHG | TEMPERATURE: 97.3 F | SYSTOLIC BLOOD PRESSURE: 124 MMHG | RESPIRATION RATE: 20 BRPM | OXYGEN SATURATION: 93 %

## 2019-08-09 PROBLEM — R06.02 SHORTNESS OF BREATH: Status: ACTIVE | Noted: 2019-08-09

## 2019-08-09 LAB
ANION GAP SERPL CALCULATED.3IONS-SCNC: 12 MMOL/L (ref 3–16)
BUN BLDV-MCNC: 16 MG/DL (ref 7–20)
CALCIUM SERPL-MCNC: 9.2 MG/DL (ref 8.3–10.6)
CHLORIDE BLD-SCNC: 97 MMOL/L (ref 99–110)
CO2: 28 MMOL/L (ref 21–32)
CREAT SERPL-MCNC: 0.6 MG/DL (ref 0.6–1.2)
GFR AFRICAN AMERICAN: >60
GFR NON-AFRICAN AMERICAN: >60
GLUCOSE BLD-MCNC: 118 MG/DL (ref 70–99)
GLUCOSE BLD-MCNC: 161 MG/DL (ref 70–99)
GLUCOSE BLD-MCNC: 186 MG/DL (ref 70–99)
GLUCOSE BLD-MCNC: 207 MG/DL (ref 70–99)
LV EF: 60 %
MAGNESIUM: 1.7 MG/DL (ref 1.8–2.4)
PERFORMED ON: ABNORMAL
POTASSIUM REFLEX MAGNESIUM: 3.3 MMOL/L (ref 3.5–5.1)
PROTEIN PROTEIN: 0.01 G/DL
PROTEIN PROTEIN: 8 MG/DL
SODIUM BLD-SCNC: 137 MMOL/L (ref 136–145)

## 2019-08-09 PROCEDURE — 6360000002 HC RX W HCPCS

## 2019-08-09 PROCEDURE — 94669 MECHANICAL CHEST WALL OSCILL: CPT

## 2019-08-09 PROCEDURE — 2709999900 HC NON-CHARGEABLE SUPPLY

## 2019-08-09 PROCEDURE — 94150 VITAL CAPACITY TEST: CPT

## 2019-08-09 PROCEDURE — 36415 COLL VENOUS BLD VENIPUNCTURE: CPT

## 2019-08-09 PROCEDURE — 94640 AIRWAY INHALATION TREATMENT: CPT

## 2019-08-09 PROCEDURE — C1894 INTRO/SHEATH, NON-LASER: HCPCS

## 2019-08-09 PROCEDURE — 6370000000 HC RX 637 (ALT 250 FOR IP)

## 2019-08-09 PROCEDURE — 2580000003 HC RX 258

## 2019-08-09 PROCEDURE — 94761 N-INVAS EAR/PLS OXIMETRY MLT: CPT

## 2019-08-09 PROCEDURE — 99152 MOD SED SAME PHYS/QHP 5/>YRS: CPT

## 2019-08-09 PROCEDURE — 6370000000 HC RX 637 (ALT 250 FOR IP): Performed by: INTERNAL MEDICINE

## 2019-08-09 PROCEDURE — 93460 R&L HRT ART/VENTRICLE ANGIO: CPT | Performed by: INTERNAL MEDICINE

## 2019-08-09 PROCEDURE — 93460 R&L HRT ART/VENTRICLE ANGIO: CPT

## 2019-08-09 PROCEDURE — 4A023N8 MEASUREMENT OF CARDIAC SAMPLING AND PRESSURE, BILATERAL, PERCUTANEOUS APPROACH: ICD-10-PCS | Performed by: INTERNAL MEDICINE

## 2019-08-09 PROCEDURE — 2700000000 HC OXYGEN THERAPY PER DAY

## 2019-08-09 PROCEDURE — 6370000000 HC RX 637 (ALT 250 FOR IP): Performed by: NURSE PRACTITIONER

## 2019-08-09 PROCEDURE — C1769 GUIDE WIRE: HCPCS

## 2019-08-09 PROCEDURE — B2111ZZ FLUOROSCOPY OF MULTIPLE CORONARY ARTERIES USING LOW OSMOLAR CONTRAST: ICD-10-PCS | Performed by: INTERNAL MEDICINE

## 2019-08-09 PROCEDURE — 2500000003 HC RX 250 WO HCPCS

## 2019-08-09 PROCEDURE — 80048 BASIC METABOLIC PNL TOTAL CA: CPT

## 2019-08-09 PROCEDURE — 2580000003 HC RX 258: Performed by: INTERNAL MEDICINE

## 2019-08-09 PROCEDURE — 99153 MOD SED SAME PHYS/QHP EA: CPT

## 2019-08-09 PROCEDURE — 71250 CT THORAX DX C-: CPT

## 2019-08-09 PROCEDURE — 6360000002 HC RX W HCPCS: Performed by: INTERNAL MEDICINE

## 2019-08-09 PROCEDURE — B2151ZZ FLUOROSCOPY OF LEFT HEART USING LOW OSMOLAR CONTRAST: ICD-10-PCS | Performed by: INTERNAL MEDICINE

## 2019-08-09 PROCEDURE — 1200000000 HC SEMI PRIVATE

## 2019-08-09 PROCEDURE — C1887 CATHETER, GUIDING: HCPCS

## 2019-08-09 PROCEDURE — 83735 ASSAY OF MAGNESIUM: CPT

## 2019-08-09 RX ORDER — SODIUM CHLORIDE 0.9 % (FLUSH) 0.9 %
10 SYRINGE (ML) INJECTION EVERY 12 HOURS SCHEDULED
Status: DISCONTINUED | OUTPATIENT
Start: 2019-08-09 | End: 2019-08-10 | Stop reason: HOSPADM

## 2019-08-09 RX ORDER — NICOTINE POLACRILEX 4 MG
15 LOZENGE BUCCAL PRN
Status: DISCONTINUED | OUTPATIENT
Start: 2019-08-09 | End: 2019-08-10 | Stop reason: HOSPADM

## 2019-08-09 RX ORDER — MIRTAZAPINE 15 MG/1
30 TABLET, FILM COATED ORAL NIGHTLY
Status: DISCONTINUED | OUTPATIENT
Start: 2019-08-09 | End: 2019-08-10 | Stop reason: HOSPADM

## 2019-08-09 RX ORDER — LEVOTHYROXINE SODIUM 0.15 MG/1
150 TABLET ORAL DAILY
Status: DISCONTINUED | OUTPATIENT
Start: 2019-08-10 | End: 2019-08-09 | Stop reason: SDUPTHER

## 2019-08-09 RX ORDER — HYDROXYZINE HYDROCHLORIDE 10 MG/1
10 TABLET, FILM COATED ORAL 3 TIMES DAILY PRN
Status: DISCONTINUED | OUTPATIENT
Start: 2019-08-09 | End: 2019-08-09 | Stop reason: SDUPTHER

## 2019-08-09 RX ORDER — NADOLOL 20 MG/1
20 TABLET ORAL DAILY
Status: DISCONTINUED | OUTPATIENT
Start: 2019-08-09 | End: 2019-08-09 | Stop reason: SDUPTHER

## 2019-08-09 RX ORDER — ALBUTEROL SULFATE 2.5 MG/3ML
2.5 SOLUTION RESPIRATORY (INHALATION) EVERY 6 HOURS PRN
Status: DISCONTINUED | OUTPATIENT
Start: 2019-08-09 | End: 2019-08-10 | Stop reason: HOSPADM

## 2019-08-09 RX ORDER — NADOLOL 20 MG/1
20 TABLET ORAL NIGHTLY
Status: DISCONTINUED | OUTPATIENT
Start: 2019-08-09 | End: 2019-08-10

## 2019-08-09 RX ORDER — ALBUTEROL SULFATE 90 UG/1
2 AEROSOL, METERED RESPIRATORY (INHALATION) EVERY 6 HOURS PRN
Status: DISCONTINUED | OUTPATIENT
Start: 2019-08-09 | End: 2019-08-10 | Stop reason: HOSPADM

## 2019-08-09 RX ORDER — AMITRIPTYLINE HYDROCHLORIDE 25 MG/1
25 TABLET, FILM COATED ORAL NIGHTLY
Status: DISCONTINUED | OUTPATIENT
Start: 2019-08-09 | End: 2019-08-10 | Stop reason: HOSPADM

## 2019-08-09 RX ORDER — ALBUTEROL SULFATE 2.5 MG/3ML
2.5 SOLUTION RESPIRATORY (INHALATION) EVERY 6 HOURS PRN
Status: DISCONTINUED | OUTPATIENT
Start: 2019-08-09 | End: 2019-08-09 | Stop reason: SDUPTHER

## 2019-08-09 RX ORDER — SODIUM CHLORIDE 0.9 % (FLUSH) 0.9 %
10 SYRINGE (ML) INJECTION EVERY 12 HOURS SCHEDULED
Status: DISCONTINUED | OUTPATIENT
Start: 2019-08-09 | End: 2019-08-09 | Stop reason: SDUPTHER

## 2019-08-09 RX ORDER — AMITRIPTYLINE HYDROCHLORIDE 25 MG/1
25 TABLET, FILM COATED ORAL NIGHTLY
Status: DISCONTINUED | OUTPATIENT
Start: 2019-08-09 | End: 2019-08-09 | Stop reason: SDUPTHER

## 2019-08-09 RX ORDER — ACETAMINOPHEN 500 MG
500 TABLET ORAL EVERY 6 HOURS PRN
Status: DISCONTINUED | OUTPATIENT
Start: 2019-08-09 | End: 2019-08-10 | Stop reason: HOSPADM

## 2019-08-09 RX ORDER — LORAZEPAM 0.5 MG/1
0.5 TABLET ORAL EVERY 6 HOURS PRN
Status: DISCONTINUED | OUTPATIENT
Start: 2019-08-09 | End: 2019-08-10 | Stop reason: HOSPADM

## 2019-08-09 RX ORDER — POTASSIUM CHLORIDE 20 MEQ/1
20 TABLET, EXTENDED RELEASE ORAL DAILY
Status: DISCONTINUED | OUTPATIENT
Start: 2019-08-09 | End: 2019-08-10 | Stop reason: HOSPADM

## 2019-08-09 RX ORDER — METHYLPREDNISOLONE SODIUM SUCCINATE 125 MG/2ML
40 INJECTION, POWDER, LYOPHILIZED, FOR SOLUTION INTRAMUSCULAR; INTRAVENOUS DAILY
Status: DISCONTINUED | OUTPATIENT
Start: 2019-08-09 | End: 2019-08-09

## 2019-08-09 RX ORDER — DEXTROSE MONOHYDRATE 25 G/50ML
12.5 INJECTION, SOLUTION INTRAVENOUS PRN
Status: DISCONTINUED | OUTPATIENT
Start: 2019-08-09 | End: 2019-08-10 | Stop reason: HOSPADM

## 2019-08-09 RX ORDER — BENZONATATE 100 MG/1
100 CAPSULE ORAL 3 TIMES DAILY PRN
Status: DISCONTINUED | OUTPATIENT
Start: 2019-08-09 | End: 2019-08-10 | Stop reason: HOSPADM

## 2019-08-09 RX ORDER — ASPIRIN 81 MG/1
81 TABLET, CHEWABLE ORAL DAILY
Status: DISCONTINUED | OUTPATIENT
Start: 2019-08-09 | End: 2019-08-10 | Stop reason: HOSPADM

## 2019-08-09 RX ORDER — HYDROXYZINE HYDROCHLORIDE 10 MG/1
10 TABLET, FILM COATED ORAL 3 TIMES DAILY PRN
Status: DISCONTINUED | OUTPATIENT
Start: 2019-08-09 | End: 2019-08-09

## 2019-08-09 RX ORDER — ALBUTEROL SULFATE 90 UG/1
2 AEROSOL, METERED RESPIRATORY (INHALATION) 4 TIMES DAILY PRN
Status: DISCONTINUED | OUTPATIENT
Start: 2019-08-09 | End: 2019-08-09 | Stop reason: SDUPTHER

## 2019-08-09 RX ORDER — ONDANSETRON 2 MG/ML
4 INJECTION INTRAMUSCULAR; INTRAVENOUS EVERY 6 HOURS PRN
Status: DISCONTINUED | OUTPATIENT
Start: 2019-08-09 | End: 2019-08-10 | Stop reason: HOSPADM

## 2019-08-09 RX ORDER — IPRATROPIUM BROMIDE AND ALBUTEROL SULFATE 2.5; .5 MG/3ML; MG/3ML
3 SOLUTION RESPIRATORY (INHALATION) EVERY 6 HOURS PRN
Status: DISCONTINUED | OUTPATIENT
Start: 2019-08-09 | End: 2019-08-09 | Stop reason: SDUPTHER

## 2019-08-09 RX ORDER — TRAZODONE HYDROCHLORIDE 50 MG/1
100 TABLET ORAL NIGHTLY PRN
Status: DISCONTINUED | OUTPATIENT
Start: 2019-08-09 | End: 2019-08-10 | Stop reason: HOSPADM

## 2019-08-09 RX ORDER — FUROSEMIDE 40 MG/1
40 TABLET ORAL DAILY
Status: DISCONTINUED | OUTPATIENT
Start: 2019-08-09 | End: 2019-08-09 | Stop reason: SDUPTHER

## 2019-08-09 RX ORDER — LEVOTHYROXINE SODIUM 0.15 MG/1
150 TABLET ORAL DAILY
Status: DISCONTINUED | OUTPATIENT
Start: 2019-08-09 | End: 2019-08-10 | Stop reason: HOSPADM

## 2019-08-09 RX ORDER — SODIUM CHLORIDE 0.9 % (FLUSH) 0.9 %
10 SYRINGE (ML) INJECTION PRN
Status: DISCONTINUED | OUTPATIENT
Start: 2019-08-09 | End: 2019-08-09 | Stop reason: SDUPTHER

## 2019-08-09 RX ORDER — GUAIFENESIN/DEXTROMETHORPHAN 100-10MG/5
5 SYRUP ORAL EVERY 6 HOURS PRN
Status: DISCONTINUED | OUTPATIENT
Start: 2019-08-09 | End: 2019-08-10 | Stop reason: HOSPADM

## 2019-08-09 RX ORDER — DEXTROSE MONOHYDRATE 50 MG/ML
100 INJECTION, SOLUTION INTRAVENOUS PRN
Status: DISCONTINUED | OUTPATIENT
Start: 2019-08-09 | End: 2019-08-10 | Stop reason: HOSPADM

## 2019-08-09 RX ORDER — FUROSEMIDE 40 MG/1
40 TABLET ORAL DAILY
Status: DISCONTINUED | OUTPATIENT
Start: 2019-08-10 | End: 2019-08-10 | Stop reason: HOSPADM

## 2019-08-09 RX ORDER — MIRTAZAPINE 15 MG/1
15 TABLET, FILM COATED ORAL NIGHTLY
Status: DISCONTINUED | OUTPATIENT
Start: 2019-08-09 | End: 2019-08-09

## 2019-08-09 RX ORDER — PREDNISONE 20 MG/1
40 TABLET ORAL DAILY
Status: DISCONTINUED | OUTPATIENT
Start: 2019-08-10 | End: 2019-08-09

## 2019-08-09 RX ORDER — IPRATROPIUM BROMIDE AND ALBUTEROL SULFATE 2.5; .5 MG/3ML; MG/3ML
3 SOLUTION RESPIRATORY (INHALATION)
Status: DISCONTINUED | OUTPATIENT
Start: 2019-08-09 | End: 2019-08-10 | Stop reason: HOSPADM

## 2019-08-09 RX ORDER — SULFAMETHOXAZOLE AND TRIMETHOPRIM 800; 160 MG/1; MG/1
1 TABLET ORAL EVERY OTHER DAY
Status: DISCONTINUED | OUTPATIENT
Start: 2019-08-09 | End: 2019-08-10 | Stop reason: HOSPADM

## 2019-08-09 RX ORDER — SODIUM CHLORIDE 0.9 % (FLUSH) 0.9 %
10 SYRINGE (ML) INJECTION PRN
Status: DISCONTINUED | OUTPATIENT
Start: 2019-08-09 | End: 2019-08-10 | Stop reason: HOSPADM

## 2019-08-09 RX ORDER — LORAZEPAM 0.5 MG/1
0.5 TABLET ORAL 2 TIMES DAILY PRN
Status: DISCONTINUED | OUTPATIENT
Start: 2019-08-09 | End: 2019-08-09 | Stop reason: SDUPTHER

## 2019-08-09 RX ORDER — PANTOPRAZOLE SODIUM 40 MG/10ML
40 INJECTION, POWDER, LYOPHILIZED, FOR SOLUTION INTRAVENOUS
Status: DISCONTINUED | OUTPATIENT
Start: 2019-08-10 | End: 2019-08-10 | Stop reason: HOSPADM

## 2019-08-09 RX ORDER — PREDNISONE 20 MG/1
40 TABLET ORAL DAILY
Status: DISCONTINUED | OUTPATIENT
Start: 2019-08-09 | End: 2019-08-10

## 2019-08-09 RX ORDER — MIDAZOLAM HYDROCHLORIDE 1 MG/ML
INJECTION INTRAMUSCULAR; INTRAVENOUS
Status: COMPLETED | OUTPATIENT
Start: 2019-08-09 | End: 2019-08-09

## 2019-08-09 RX ORDER — ASPIRIN 81 MG/1
81 TABLET, CHEWABLE ORAL DAILY
Status: DISCONTINUED | OUTPATIENT
Start: 2019-08-09 | End: 2019-08-09 | Stop reason: SDUPTHER

## 2019-08-09 RX ORDER — ASPIRIN 325 MG
325 TABLET ORAL ONCE
Status: COMPLETED | OUTPATIENT
Start: 2019-08-09 | End: 2019-08-09

## 2019-08-09 RX ADMIN — TRAZODONE HYDROCHLORIDE 100 MG: 50 TABLET ORAL at 21:10

## 2019-08-09 RX ADMIN — IPRATROPIUM BROMIDE AND ALBUTEROL SULFATE 3 ML: .5; 3 SOLUTION RESPIRATORY (INHALATION) at 15:33

## 2019-08-09 RX ADMIN — SULFAMETHOXAZOLE AND TRIMETHOPRIM 1 TABLET: 800; 160 TABLET ORAL at 16:40

## 2019-08-09 RX ADMIN — ALBUTEROL SULFATE 2.5 MG: 2.5 SOLUTION RESPIRATORY (INHALATION) at 03:44

## 2019-08-09 RX ADMIN — BENZONATATE 100 MG: 100 CAPSULE ORAL at 21:11

## 2019-08-09 RX ADMIN — GUAIFENESIN AND DEXTROMETHORPHAN 5 ML: 100; 10 SYRUP ORAL at 03:43

## 2019-08-09 RX ADMIN — MIRTAZAPINE 30 MG: 15 TABLET, FILM COATED ORAL at 21:11

## 2019-08-09 RX ADMIN — INSULIN LISPRO 1 UNITS: 100 INJECTION, SOLUTION INTRAVENOUS; SUBCUTANEOUS at 21:11

## 2019-08-09 RX ADMIN — PREDNISONE 40 MG: 20 TABLET ORAL at 21:11

## 2019-08-09 RX ADMIN — Medication 10 ML: at 21:26

## 2019-08-09 RX ADMIN — MIDAZOLAM HYDROCHLORIDE 2 MG: 1 INJECTION INTRAMUSCULAR; INTRAVENOUS at 10:40

## 2019-08-09 RX ADMIN — POTASSIUM CHLORIDE 20 MEQ: 20 TABLET, EXTENDED RELEASE ORAL at 16:40

## 2019-08-09 RX ADMIN — GUAIFENESIN AND DEXTROMETHORPHAN 5 ML: 100; 10 SYRUP ORAL at 16:40

## 2019-08-09 RX ADMIN — LORAZEPAM 0.5 MG: 0.5 TABLET ORAL at 19:59

## 2019-08-09 RX ADMIN — Medication 325 MG: at 09:24

## 2019-08-09 RX ADMIN — IPRATROPIUM BROMIDE AND ALBUTEROL SULFATE 3 ML: .5; 3 SOLUTION RESPIRATORY (INHALATION) at 20:38

## 2019-08-09 RX ADMIN — AMITRIPTYLINE HYDROCHLORIDE 25 MG: 25 TABLET, FILM COATED ORAL at 21:11

## 2019-08-09 ASSESSMENT — PAIN DESCRIPTION - ORIENTATION: ORIENTATION: MID

## 2019-08-09 ASSESSMENT — PAIN DESCRIPTION - LOCATION: LOCATION: BACK

## 2019-08-09 ASSESSMENT — PAIN DESCRIPTION - PAIN TYPE: TYPE: ACUTE PAIN

## 2019-08-09 ASSESSMENT — PAIN DESCRIPTION - ONSET: ONSET: PROGRESSIVE

## 2019-08-09 ASSESSMENT — PAIN DESCRIPTION - DESCRIPTORS: DESCRIPTORS: ACHING

## 2019-08-09 ASSESSMENT — PAIN SCALES - GENERAL: PAINLEVEL_OUTOF10: 7

## 2019-08-09 ASSESSMENT — PAIN DESCRIPTION - FREQUENCY: FREQUENCY: INTERMITTENT

## 2019-08-09 NOTE — DISCHARGE INSTR - COC
Continuity of Care Form    Patient Name: Akua Diaz   :  1955  MRN:  [id number]    Admit date:  2019  Discharge date:  8/10/19    Code Status Order: Full Code   Advance Directives:     Admitting Physician:  Gabriella Foster MD  PCP: Miguel Angel Rico MD    Discharging Nurse: [facility] Unit/Room#: 0113/0113-01  Discharging Unit Phone Number: ***    Emergency Contact:   Extended Emergency Contact Information  Primary Emergency Contact: Nimesh LOAIZA  [facility]arcia [address] [place] [address] Phone: [phone]  Relation: Spouse  Secondary Emergency Contact: Joan Stephenson  Home Phone: [phone]  Relation: Child    Past Surgical History:  Past Surgical History:   Procedure Laterality Date    BRONCHOSCOPY N/A 2019    EBUS WF W/ANES.  performed by Elizabeth Rodriguez MD at [address] [place] [address]  2019    BRONCHOSCOPY/TRANSBRONCHIAL NEEDLE BIOPSY performed by Elizabeth Rodriguez MD at [address] [place] [address]  2019    BRONCHOSCOPY/TRANSBRONCHIAL LUNG BIOPSY performed by Elizabeth Rodriguez MD at [address] [place] [address]  2019    BRONCHOSCOPY ALVEOLAR LAVAGE performed by Elizabeth Rodriguez MD at [address] [place] [address]  07/10/2019    BRONCHOSCOPY N/A 7/10/2019    BRONCHOSCOPY ALVEOLAR LAVAGE performed by Jacky Snow MD at [address] [place] [address] Bilateral 2019    BRONCHOSCOPY N/A 2019    BRONCHOSCOPY ALVEOLAR LAVAGE performed by Kati Sutherland MD at [address] [place] [address], TOTAL ABDOMINAL      partial       Immunization History:   Immunization History   Administered Date(s) Administered    Influenza Virus Vaccine 01/15/2016, 2016    Pneumococcal Conjugate 13-valent ([id number]) 2019    Pneumococcal Polysaccharide ([id number]) 2013, 2014       Active Problems:  Patient Active Problem List   Diagnosis Code  Chest pain R07.9    HTN (hypertension) I10    Hyperlipidemia with target LDL less than 130 E78.5    Depression F32.9    Hypokalemia E87.6    Insomnia G47.00    Trochanteric bursitis of both hips M70.61, M70.62    Migraine G43.909    Syncope R55    Gastrointestinal hemorrhage associated with gastric ulcer K25.4    Acute blood loss anemia D62    Fatigue R53.83    Hypothyroidism E03.9    Mild single current episode of major depressive disorder (HCC) F32.0    Anxiety F41.9    Acute respiratory distress R06.03    Acute and chronic respiratory failure with hypoxia ([facility]) J96.21    Bronchitis J40    Bronchospasm J98.01    Hypoxia R09.02    Multifocal pneumonia J18.9    Atrial fibrillation, chronic ([facility]) I48.2    Bronchiectasis with acute exacerbation ([facility]) J47.1    Pneumonia due to organism J18.9    Atypical pneumonia J18.9    Acute bronchitis with bronchospasm J20.9    Acute on chronic diastolic congestive heart failure ([facility]) I50.33    Class 2 obesity with body mass index (BMI) of 39.0 to 39.9 in adult E66.9, Z68.39    Abnormal chest CT R93.89    Fever R50.9    Acute diastolic heart failure ([facility]) I50.31    ILD (interstitial lung disease) ([facility]) J84.9    Acute respiratory failure with hypoxia ([facility]) J96.01    Restrictive lung disease J98.4    Acute hypoxemic respiratory failure ([facility]) J96.01    Abnormal chest x-ray R93.89    Abnormal CT of the chest R93.89    SOB (shortness of breath) R06.02    Acute cystitis without hematuria N30.00    COPD exacerbation ([facility]) J44.1    COPD (chronic obstructive pulmonary disease) ([facility]) J44.9    Shortness of breath R06.02       Isolation/Infection:   Isolation          No Isolation            Nurse Assessment:  Last Vital Signs: /79   Pulse 102   Temp 98.3 °F (36.8 °C) (Oral)   Resp 18   Ht 5' 3\" (1.6 m)   Wt 201 lb (91.2 kg)   SpO2 93%   BMI 35.61 kg/m²     Last documented pain score (0-10 scale): Pain Level: 7  Last Weight:    Wt

## 2019-08-09 NOTE — H&P
Brief Pre-Op Note/Sedation Assessment      Andreas Flores  1955  Cath Pool Rm/NONE  [id number]  10:02 AM    Planned Procedure: Cardiac Catheterization Procedure    Post Procedure Plan: Return to same level of care    Consent: I have discussed with the patient and/or the patient representative the indication, alternatives, and the possible risks and/or complications of the planned procedure and the anesthesia methods. The patient and/or patient representative appear to understand and agree to proceed. Chief Complaint: Anginal Equivalent  Dyspnea on Exertion      Indications for the Procedure:   CAD Presentation:  Worsening Angina  Anginal Classification within 2 weeks:  No symptoms  NYHA Heart Failure Class within 2 weeks: Class IV - Symptoms of HF at rest, Newly Diagnosed? Yes, Heart Failure Type: Diastolic      Anti- Anginal Meds within 2 weeks:   ANTI-ANGINAL MEDS: Yes: Beta Blockers and Aspirin      Stress or Imaging Studies Performed:  None    Vital Signs:  Ht 5' 3\" (1.6 m)   Wt 201 lb (91.2 kg)   BMI 35.61 kg/m²     Allergies: Allergies   Allergen Reactions    Penicillins Anaphylaxis     Tolerates Meropenem.  Cefuroxime      Tolerates Meropenem.  Doxycycline Hives    Imitrex [Sumatriptan] Other (See Comments)     Feels like pins and needles    Meperidine     Sulfa Antibiotics Hives    Bactrim [Sulfamethoxazole-Trimethoprim] Rash    Keflex [Cephalexin] Itching and Rash     Tolerates Meropenem.  Tape [place] Tape] Rash       Past Medical History:  Past Medical History:   Diagnosis Date    Anxiety     [facility] disease) [facility]     Depression     Hyperlipidemia     Hypertension     Rash     Thyroid disease          Surgical History:  Past Surgical History:   Procedure Laterality Date    BRONCHOSCOPY N/A 6/27/2019    EBUS WF W/ANES.  performed by Eliazar Meeks MD at [address]   6/27/2019    BRONCHOSCOPY/TRANSBRONCHIAL NEEDLE
Medication Sig Start Date End Date Taking? Authorizing Provider   sulfamethoxazole-trimethoprim (BACTRIM DS;SEPTRA DS) 800-160 MG per tablet 1 tablet M-W-F 7/17/19   Saima Chakraborty MD   predniSONE (DELTASONE) 10 MG tablet 40mg for 1 more week, 30mg for 2 weeks, 20mg for 2 weeks, 10 mg for 2 weeks 7/17/19   Saima Chakraborty MD   albuterol (PROVENTIL) (2.5 MG/3ML) 0.083% nebulizer solution Take 3 mLs by nebulization every 6 hours as needed for Wheezing 7/17/19   Saima Chakraborty MD   albuterol sulfate HFA (PROVENTIL HFA) 108 (90 Base) MCG/ACT inhaler Inhale 2 puffs into the lungs every 6 hours as needed for Wheezing 7/17/19   Saima Chakraborty MD   furosemide (LASIX) 40 MG tablet Take 1 tablet by mouth daily 7/15/19   Saeid Warren MD   potassium chloride (KLOR-CON M) 20 MEQ extended release tablet Take 1 tablet by mouth daily 7/15/19   Saeid Warren MD   OXYGEN Inhale 3 L into the lungs 7/14/19   Saeid Warren MD   LORazepam (ATIVAN) 0.5 MG tablet Take 0.5 mg by mouth every 6 hours as needed for Anxiety.     Historical Provider, MD   Azelastine-Fluticasone (DYMISTA) 137-50 MCG/ACT SUSP by Nasal route    Historical Provider, MD   Esomeprazole Magnesium (NEXIUM 24HR PO) Take by mouth    Historical Provider, MD   hydrOXYzine (ATARAX) 10 MG tablet Take 10 mg by mouth 3 times daily as needed for Itching    Historical Provider, MD   mirtazapine (REMERON) 30 MG tablet TAKE 1 TABLET BY MOUTH EVERY DAY AT NIGHT 6/14/19   Jeanne Cárdenas MD   ipratropium-albuterol (DUONEB) 0.5-2.5 (3) MG/3ML SOLN nebulizer solution Inhale 3 mLs into the lungs every 6 hours as needed for Shortness of Breath 6/8/19   Aleisha Arvizu MD   albuterol sulfate  (90 Base) MCG/ACT inhaler Inhale 2 puffs into the lungs 4 times daily as needed for Wheezing 5/30/19   Lisa Armenta MD   levothyroxine (SYNTHROID) 150 MCG tablet TAKE 1 TABLET BY MOUTH EVERY DAY 5/2/19   Jeanne Cárdenas MD   nadolol (CORGARD) 20 MG tablet TAKE 2 TABLETS BY MOUTH

## 2019-08-09 NOTE — TELEPHONE ENCOUNTER
----- Message from Traci Davila MD sent at 8/9/2019  1:51 PM EDT -----  F/U with [facility] next week if possible, RHC with normal filling pressures, having HRCT today and home with steroids, will need to be seen in close f/u to work on steroids and planning biopsy

## 2019-08-10 VITALS
HEART RATE: 74 BPM | HEIGHT: 63 IN | SYSTOLIC BLOOD PRESSURE: 122 MMHG | OXYGEN SATURATION: 92 % | BODY MASS INDEX: 35.61 KG/M2 | DIASTOLIC BLOOD PRESSURE: 75 MMHG | TEMPERATURE: 97.8 F | WEIGHT: 201 LBS | RESPIRATION RATE: 18 BRPM

## 2019-08-10 LAB
ANION GAP SERPL CALCULATED.3IONS-SCNC: 11 MMOL/L (ref 3–16)
BLOOD CULTURE, ROUTINE: NORMAL
BUN BLDV-MCNC: 10 MG/DL (ref 7–20)
CALCIUM SERPL-MCNC: 9 MG/DL (ref 8.3–10.6)
CHLORIDE BLD-SCNC: 99 MMOL/L (ref 99–110)
CO2: 27 MMOL/L (ref 21–32)
CREAT SERPL-MCNC: <0.5 MG/DL (ref 0.6–1.2)
CULTURE, BLOOD 2: NORMAL
GFR AFRICAN AMERICAN: >60
GFR NON-AFRICAN AMERICAN: >60
GLUCOSE BLD-MCNC: 194 MG/DL (ref 70–99)
GLUCOSE BLD-MCNC: 201 MG/DL (ref 70–99)
GLUCOSE BLD-MCNC: 278 MG/DL (ref 70–99)
PERFORMED ON: ABNORMAL
PERFORMED ON: ABNORMAL
POTASSIUM REFLEX MAGNESIUM: 4 MMOL/L (ref 3.5–5.1)
SODIUM BLD-SCNC: 137 MMOL/L (ref 136–145)

## 2019-08-10 PROCEDURE — 99232 SBSQ HOSP IP/OBS MODERATE 35: CPT | Performed by: NURSE PRACTITIONER

## 2019-08-10 PROCEDURE — 6370000000 HC RX 637 (ALT 250 FOR IP): Performed by: NURSE PRACTITIONER

## 2019-08-10 PROCEDURE — 80048 BASIC METABOLIC PNL TOTAL CA: CPT

## 2019-08-10 PROCEDURE — 94669 MECHANICAL CHEST WALL OSCILL: CPT

## 2019-08-10 PROCEDURE — 36415 COLL VENOUS BLD VENIPUNCTURE: CPT

## 2019-08-10 PROCEDURE — C9113 INJ PANTOPRAZOLE SODIUM, VIA: HCPCS | Performed by: NURSE PRACTITIONER

## 2019-08-10 PROCEDURE — 6370000000 HC RX 637 (ALT 250 FOR IP): Performed by: INTERNAL MEDICINE

## 2019-08-10 PROCEDURE — 94640 AIRWAY INHALATION TREATMENT: CPT

## 2019-08-10 PROCEDURE — 2580000003 HC RX 258: Performed by: INTERNAL MEDICINE

## 2019-08-10 PROCEDURE — 94761 N-INVAS EAR/PLS OXIMETRY MLT: CPT

## 2019-08-10 PROCEDURE — 2700000000 HC OXYGEN THERAPY PER DAY

## 2019-08-10 PROCEDURE — 6360000002 HC RX W HCPCS: Performed by: NURSE PRACTITIONER

## 2019-08-10 RX ORDER — PREDNISONE 50 MG/1
50 TABLET ORAL DAILY
Qty: 30 TABLET | Refills: 0 | Status: SHIPPED | OUTPATIENT
Start: 2019-08-11 | End: 2019-08-15 | Stop reason: ALTCHOICE

## 2019-08-10 RX ORDER — NADOLOL 20 MG/1
20 TABLET ORAL DAILY
Status: DISCONTINUED | OUTPATIENT
Start: 2019-08-10 | End: 2019-08-10 | Stop reason: HOSPADM

## 2019-08-10 RX ORDER — BENZONATATE 100 MG/1
100 CAPSULE ORAL 3 TIMES DAILY PRN
Qty: 30 CAPSULE | Refills: 0 | Status: SHIPPED | OUTPATIENT
Start: 2019-08-10 | End: 2019-12-12 | Stop reason: ALTCHOICE

## 2019-08-10 RX ORDER — NADOLOL 20 MG/1
20 TABLET ORAL DAILY
Qty: 30 TABLET | Refills: 1 | Status: SHIPPED | OUTPATIENT
Start: 2019-08-11 | End: 2019-12-30 | Stop reason: SDUPTHER

## 2019-08-10 RX ADMIN — IPRATROPIUM BROMIDE AND ALBUTEROL SULFATE 3 ML: .5; 3 SOLUTION RESPIRATORY (INHALATION) at 08:22

## 2019-08-10 RX ADMIN — SERTRALINE HYDROCHLORIDE 200 MG: 50 TABLET ORAL at 09:03

## 2019-08-10 RX ADMIN — Medication 10 ML: at 09:04

## 2019-08-10 RX ADMIN — ASPIRIN 81 MG 81 MG: 81 TABLET ORAL at 09:03

## 2019-08-10 RX ADMIN — PREDNISONE 40 MG: 20 TABLET ORAL at 09:03

## 2019-08-10 RX ADMIN — FUROSEMIDE 40 MG: 40 TABLET ORAL at 09:03

## 2019-08-10 RX ADMIN — NADOLOL 20 MG: 20 TABLET ORAL at 09:03

## 2019-08-10 RX ADMIN — LEVOTHYROXINE SODIUM 150 MCG: 150 TABLET ORAL at 09:03

## 2019-08-10 RX ADMIN — INSULIN LISPRO 3 UNITS: 100 INJECTION, SOLUTION INTRAVENOUS; SUBCUTANEOUS at 12:10

## 2019-08-10 RX ADMIN — PANTOPRAZOLE SODIUM 40 MG: 40 INJECTION, POWDER, FOR SOLUTION INTRAVENOUS at 06:32

## 2019-08-10 RX ADMIN — BENZONATATE 100 MG: 100 CAPSULE ORAL at 09:18

## 2019-08-10 RX ADMIN — BENZONATATE 100 MG: 100 CAPSULE ORAL at 03:29

## 2019-08-10 RX ADMIN — INSULIN LISPRO 1 UNITS: 100 INJECTION, SOLUTION INTRAVENOUS; SUBCUTANEOUS at 09:05

## 2019-08-10 RX ADMIN — POTASSIUM CHLORIDE 20 MEQ: 20 TABLET, EXTENDED RELEASE ORAL at 09:03

## 2019-08-10 RX ADMIN — ACETAMINOPHEN 500 MG: 500 TABLET ORAL at 03:29

## 2019-08-10 ASSESSMENT — PAIN SCALES - GENERAL: PAINLEVEL_OUTOF10: 6

## 2019-08-10 NOTE — DISCHARGE SUMMARY
Hospital Medicine Discharge Summary    Patient ID: Giuliana Gins      Patient's PCP: Shira Bahena MD    Admit Date: 8/9/2019     Discharge Date: 8/10/2019      Admitting Physician: Ciarra Marie MD     Discharge Physician: Ciarra Marie MD     Discharge Diagnoses: Active Hospital Problems    Diagnosis    Chronic respiratory failure with hypoxia (HCC) [J96.11]    Shortness of breath [R06.02]    SOB (shortness of breath) [R06.02]       The patient was seen and examined on day of discharge and this discharge summary is in conjunction with any daily progress note from day of discharge. Hospital Course:   History Of Present Illness:    75 y.o. female with chronic resp failure/copd on 2L at home, htn, hld who presented to [facility] with sob. Pt first came to [facility] on 8/4/19 for sob issues and workup by pulm/cards was concerning for CHF. Pt was transferred to [facility] for RHC/LHC and noted normal findings/filling pressures. Pt was admitted for further workup. She denies any cp but notes LEE and cough with productive yellowish sputum before presenting at [facility]. No obvious environmental exposures.     Acute on chronic resp failure- unclear etiology, discussed with Dr. Maverick Chen at [facility]), pt currently sees Dr. Gale Winn with plans for possible lung bx in the future  -continued ppx bactrim indefinitely while on steroids  -was on iv solumedrol, switched to prednisone 40mg daily 8/9,  -f/u pulm as outpt  -HRCT ordered to rule out ILD and noted possible organizing pna(?), discussed with Dr. Dallas Conley over the phone, plan for 50mg prednisone daily until f/u with Dr. Gale Winn in 1 week     htn- stable  -lasix, nadolol continued     Thyroid - appears euthyroid  -on synthroid     Copd- on 2L at home  -On albuterol  -duoneb prn     hld- per emr  -on preventative asa      Physical Exam Performed:     /75   Pulse 74   Temp 97.8 °F (36.6 °C) (Axillary)   Resp 18   Ht 5' 3\" (1.6 m)   Wt 201 lb (91.2 kg)

## 2019-08-10 NOTE — PROGRESS NOTES
RT at bedside at this time for 0800 breathing treatment. Patient found on room air with an SpO2 of 79%. Patient was placed back on her 3 LPM nasal cannula and her SpO2 only increased to 82%. Supplemental oxygen increased to 5 LPM at this time and her SpO2 increased to 92%. Patient is not complaining of any symptoms of respiratory distress at this time and appears to not have an increased WOB. RN updated on this change.

## 2019-08-10 NOTE — PROGRESS NOTES
[facility] [address]   Daily Progress Note    Admit Date:  8/9/2019  HPI:    Presented with shortness of breath, orthopnea, edema. No chest pain. Hx of HTN, HLD, PAF, chronic respiratory failure. Transferred from [facility] to [facility] for R&LHC to rule out constriction/ restriction and CAD. This revealed normal coronary arteries, normal filling pressures and cardiac output, no evidence of constrictive/ restrictive cardiomyopathy. Subjective:  Ms. Angeline Felton sitting up in bed eating lunch. She denies any complications overnight. Was able to sleep well. Breathing is at baseline. Cough improved with tesslon perles. Objective:   /75   Pulse 74   Temp 97.8 °F (36.6 °C) (Axillary)   Resp 18   Ht 5' 3\" (1.6 m)   Wt 201 lb (91.2 kg)   SpO2 92%   BMI 35.61 kg/m²       Intake/Output Summary (Last 24 hours) at 8/10/2019 1243  Last data filed at 8/10/2019 1139  Gross per 24 hour   Intake 1268 ml   Output 2375 ml   Net -1107 ml     Wt Readings from Last 3 Encounters:   08/09/19 201 lb (91.2 kg)   08/04/19 201 lb 4.8 oz (91.3 kg)   07/17/19 190 lb (86.2 kg)         ASSESSMENT:   1. Dyspnea - congestion not cardiac related  2. HTN - stable  3. COPD with chronic hypoxemic respiratory failure - on O2 3 lpm      PLAN:  1. No further recommendations from heart perspective. Follow up with PCP and pulmonary. Aspirin not indicated from cardiac perspective.      PANDA Siddiqui - CNP, 8/10/2019, 12:43 PM  [facility] [address]   [phone]       Telemetry: SR   NYHA: III    Physical Exam:  General:  Awake, alert, NAD  Skin:  Warm and dry  Neck:  JVP 8 cm  Chest:  Clear to auscultation though diminished  Cardiovascular:  RRR, normal S1S2, no m/g/r  Abdomen:  Soft, nontender, +bowel sounds  Extremities:  No BLE edema  right femoral site without ooze or hematoma, moderate ecchymosis, dressing C,D,I, 2+ pulse      Medications:    nadolol  20 mg Oral Daily    [START ON 8/11/2019] predniSONE  50 mg Oral Daily

## 2019-08-10 NOTE — PLAN OF CARE
Pt satisfied with current pain medication regimen. Pt remains free from fall and injury. Non-skid slippers on. Fall risk band on wrist, bed alarm in use. Instructed to call for assistance. Call light in reach, will monitor.

## 2019-08-11 ENCOUNTER — CARE COORDINATION (OUTPATIENT)
Dept: CASE MANAGEMENT | Age: 64
End: 2019-08-11

## 2019-08-11 DIAGNOSIS — I10 ESSENTIAL HYPERTENSION: Primary | ICD-10-CM

## 2019-08-11 NOTE — CARE COORDINATION
[facility] Transitions Initial Follow Up Call    Call within 2 business days of discharge: Yes    Patient: Kamila Tello Patient : 1955   MRN: [id number]  Reason for Admission: Dyspnea, hypoxia  Discharge Date: 8/10/19 RARS: Readmission Risk Score: 30      Last Discharge [facility]       Complaint Diagnosis Description Type Department Provider    19   Admission (Discharged) from [address], MD           Spoke with: Linnea Martinez, patient's daughter    Facility:  [facility] transferred to [facility] Transitions 24 Hour Call    Do you have all of your prescriptions and are they filled?:  Yes  Post Acute Services:  Home Health (Comment: [facility])  Patient DME:  Sandra Truong chair  Patient Home Equipment:  Nebulizer, Oxygen  Do you have support at home?:  Partner/Spouse/SO  Are you an active caregiver in your home?:  No  Care Transitions Interventions     Other Services:  Declined          Follow Up:  Received return phone call from patient's daughter Linnea Martinez. She states patient's cough has decreased. Continues to have greenish color sputum but patient reports sputum has decreased. Linnea Martinez also reports that [employer] have been effective. Denies having any fever, chills, chest pain. Reviewed medication list.  1111F completed. She states [facility] has not reached out to her mother as of yet. CTN will contact [facility] to verify. Linnea Martinez will be calling MD office tomorrow to schedule follow up appointments. She will drive patient to appointments. They do not have any questions or concerns at this time. Contacted [facility]. Spoke with Albert Rose. She confirmed that someone should be in touch with patient on Monday, 19.     Future Appointments   Date Time Provider Calvin Mcmullen   2019 12:00 PM MMO CT RM 1 [facility] JEAN PIERRE [facility] [facility]   2019  1:15 PM Campos Alejandro MD [facility] PULM [facility]       Khoi Avina RN

## 2019-08-12 LAB
ALBUMIN SERPL-MCNC: 3.4 G/DL (ref 3.1–4.9)
ALPHA-1-GLOBULIN: 0.3 G/DL (ref 0.2–0.4)
ALPHA-2-GLOBULIN: 0.9 G/DL (ref 0.4–1.1)
BETA GLOBULIN: 1.8 G/DL (ref 0.9–1.6)
FUNGUS (MYCOLOGY) CULTURE: ABNORMAL
FUNGUS (MYCOLOGY) CULTURE: ABNORMAL
FUNGUS STAIN: ABNORMAL
FUNGUS STAIN: ABNORMAL
GAMMA GLOBULIN: 1.2 G/DL (ref 0.6–1.8)
ORGANISM: ABNORMAL
ORGANISM: ABNORMAL
TOTAL PROTEIN: 6.4 G/DL (ref 6.4–8.2)
URINE ELECTROPHORESIS INTERP: NORMAL

## 2019-08-13 ENCOUNTER — TELEPHONE (OUTPATIENT)
Dept: FAMILY MEDICINE CLINIC | Age: 64
End: 2019-08-13

## 2019-08-13 LAB
AFB CULTURE (MYCOBACTERIA): NORMAL
AFB CULTURE (MYCOBACTERIA): NORMAL
AFB SMEAR: NORMAL
AFB SMEAR: NORMAL
SPE/IFE INTERPRETATION: NORMAL

## 2019-08-13 NOTE — TELEPHONE ENCOUNTER
[facility] care calling for verbal orders to start OT and plan of care.  Will be doing a total of 7 visits until 9/5/

## 2019-08-15 ENCOUNTER — TELEPHONE (OUTPATIENT)
Dept: PULMONOLOGY | Age: 64
End: 2019-08-15

## 2019-08-15 ENCOUNTER — OFFICE VISIT (OUTPATIENT)
Dept: PULMONOLOGY | Age: 64
End: 2019-08-15
Payer: COMMERCIAL

## 2019-08-15 VITALS
HEIGHT: 63 IN | WEIGHT: 202 LBS | RESPIRATION RATE: 16 BRPM | TEMPERATURE: 98 F | HEART RATE: 92 BPM | SYSTOLIC BLOOD PRESSURE: 110 MMHG | DIASTOLIC BLOOD PRESSURE: 70 MMHG | OXYGEN SATURATION: 96 % | BODY MASS INDEX: 35.79 KG/M2

## 2019-08-15 DIAGNOSIS — D84.9 IMMUNOSUPPRESSION (HCC): Primary | ICD-10-CM

## 2019-08-15 DIAGNOSIS — J84.10 PULMONARY FIBROSIS (HCC): ICD-10-CM

## 2019-08-15 DIAGNOSIS — R06.02 SOB (SHORTNESS OF BREATH): ICD-10-CM

## 2019-08-15 PROCEDURE — 1036F TOBACCO NON-USER: CPT | Performed by: INTERNAL MEDICINE

## 2019-08-15 PROCEDURE — 4040F PNEUMOC VAC/ADMIN/RCVD: CPT | Performed by: INTERNAL MEDICINE

## 2019-08-15 PROCEDURE — G8400 PT W/DXA NO RESULTS DOC: HCPCS | Performed by: INTERNAL MEDICINE

## 2019-08-15 PROCEDURE — 3017F COLORECTAL CA SCREEN DOC REV: CPT | Performed by: INTERNAL MEDICINE

## 2019-08-15 PROCEDURE — 1123F ACP DISCUSS/DSCN MKR DOCD: CPT | Performed by: INTERNAL MEDICINE

## 2019-08-15 PROCEDURE — G8417 CALC BMI ABV UP PARAM F/U: HCPCS | Performed by: INTERNAL MEDICINE

## 2019-08-15 PROCEDURE — G8427 DOCREV CUR MEDS BY ELIG CLIN: HCPCS | Performed by: INTERNAL MEDICINE

## 2019-08-15 PROCEDURE — 1111F DSCHRG MED/CURRENT MED MERGE: CPT | Performed by: INTERNAL MEDICINE

## 2019-08-15 PROCEDURE — 1090F PRES/ABSN URINE INCON ASSESS: CPT | Performed by: INTERNAL MEDICINE

## 2019-08-15 PROCEDURE — 99215 OFFICE O/P EST HI 40 MIN: CPT | Performed by: INTERNAL MEDICINE

## 2019-08-15 RX ORDER — PREDNISONE 20 MG/1
40 TABLET ORAL DAILY
Qty: 60 TABLET | Refills: 1 | Status: SHIPPED | OUTPATIENT
Start: 2019-08-15 | End: 2019-12-12 | Stop reason: ALTCHOICE

## 2019-08-15 RX ORDER — SULFAMETHOXAZOLE AND TRIMETHOPRIM 800; 160 MG/1; MG/1
TABLET ORAL
Qty: 12 TABLET | Refills: 1 | Status: SHIPPED | OUTPATIENT
Start: 2019-08-15 | End: 2019-10-01 | Stop reason: SDUPTHER

## 2019-08-15 NOTE — PROGRESS NOTES
phenotypically abnormal cells  AFB negative  Eosinophils 6%     CRP 56.9  AXEL negative  ANCA neg  HP panel neg    8/9/19 LHC/RHC: no CAD, PCWP 7, PA mean 19     PFTs 9/24/18 at [facility] were inconclusive due to excessive variability in her results. There was not an obstructive ratio however. 3/1/19   FVC  (62%) FEV1 1.51 (64%) FEV1/FVC ratio 0.79   TLC  (66%)  RV  (%)   DLCO (43 corrects to 86%)  ERV 34%   MCT Negative    IMAGING:  I personally reviewed and interpreted the following imaging today in the office:   3/15/19 HRCT:   Mild mosaic attenuation throughout the lungs on expiratory images, compatible   with small airways disease or air trapping.       There are few subpleural irregular opacities throughout the lungs   bilaterally, in the apices, and in the lower lobes.  Changes appears similar   compared to prior, and could be due to scarring or mild nonspecific fibrosis.       Mild fusiform bronchiectasis right lower lobe, and traction bronchiectasis   right middle lobe.       Scattered areas of bronchial wall thickening are seen     6/16/19 Chest CT:   Moderate multifocal airspace opacity involving all segments of both   lungs-moderately worse over the past 2 weeks.  Multifocal pneumonia is   favored.  Asymmetric pulmonary edema superimposed upon COPD possible but less   likely.  Atypical pneumonia might be considered clinically. 8/9/19 hrct     Pulmonary parenchymal findings most compatible with organizing pneumonia. Since 03/08/2019, there has been progressive ground-glass abnormality with   traction bronchiolectasis.       Unchanged mediastinal lymphadenopathy, presumably reactive. ASSESSMENT:  · Abnormal Chest CT: Progressive changes on CT scans in March, May and June and Aug with repeated hospitalizations and progressive hypoxia. · The differential includes , Chronic HP, NSIP. Less likely Eosinophilic pneumonia, CT-ILD or vasculitis given negative labs.  Negative EBUS with normal lymph nodes

## 2019-08-16 ENCOUNTER — TELEPHONE (OUTPATIENT)
Dept: FAMILY MEDICINE CLINIC | Age: 64
End: 2019-08-16

## 2019-08-16 ENCOUNTER — APPOINTMENT (OUTPATIENT)
Dept: GENERAL RADIOLOGY | Age: 64
End: 2019-08-16
Payer: COMMERCIAL

## 2019-08-16 ENCOUNTER — HOSPITAL ENCOUNTER (EMERGENCY)
Age: 64
Discharge: ANOTHER HOSPITAL | End: 2019-08-16
Attending: EMERGENCY MEDICINE
Payer: COMMERCIAL

## 2019-08-16 VITALS
DIASTOLIC BLOOD PRESSURE: 63 MMHG | RESPIRATION RATE: 28 BRPM | SYSTOLIC BLOOD PRESSURE: 120 MMHG | BODY MASS INDEX: 35.79 KG/M2 | HEIGHT: 63 IN | TEMPERATURE: 98.6 F | OXYGEN SATURATION: 96 % | HEART RATE: 84 BPM | WEIGHT: 202 LBS

## 2019-08-16 DIAGNOSIS — J98.4 LUNG DISEASE: ICD-10-CM

## 2019-08-16 DIAGNOSIS — A41.9 SEPSIS, DUE TO UNSPECIFIED ORGANISM: ICD-10-CM

## 2019-08-16 DIAGNOSIS — J18.9 RECURRENT PNEUMONIA: Primary | ICD-10-CM

## 2019-08-16 LAB
A/G RATIO: 0.8 (ref 1.1–2.2)
ALBUMIN SERPL-MCNC: 3.4 G/DL (ref 3.4–5)
ALP BLD-CCNC: 105 U/L (ref 40–129)
ALT SERPL-CCNC: 26 U/L (ref 10–40)
ANION GAP SERPL CALCULATED.3IONS-SCNC: 15 MMOL/L (ref 3–16)
AST SERPL-CCNC: 42 U/L (ref 15–37)
BASOPHILS ABSOLUTE: 0.1 K/UL (ref 0–0.2)
BASOPHILS RELATIVE PERCENT: 0.4 %
BILIRUB SERPL-MCNC: 0.6 MG/DL (ref 0–1)
BUN BLDV-MCNC: 19 MG/DL (ref 7–20)
CALCIUM SERPL-MCNC: 9.4 MG/DL (ref 8.3–10.6)
CHLORIDE BLD-SCNC: 97 MMOL/L (ref 99–110)
CO2: 27 MMOL/L (ref 21–32)
CREAT SERPL-MCNC: 0.8 MG/DL (ref 0.6–1.2)
EKG ATRIAL RATE: 315 BPM
EKG ATRIAL RATE: 82 BPM
EKG DIAGNOSIS: NORMAL
EKG DIAGNOSIS: NORMAL
EKG P AXIS: 34 DEGREES
EKG P-R INTERVAL: 142 MS
EKG Q-T INTERVAL: 342 MS
EKG Q-T INTERVAL: 402 MS
EKG QRS DURATION: 82 MS
EKG QRS DURATION: 84 MS
EKG QTC CALCULATION (BAZETT): 469 MS
EKG QTC CALCULATION (BAZETT): 505 MS
EKG R AXIS: 25 DEGREES
EKG R AXIS: 30 DEGREES
EKG T AXIS: 106 DEGREES
EKG T AXIS: 129 DEGREES
EKG VENTRICULAR RATE: 131 BPM
EKG VENTRICULAR RATE: 82 BPM
EOSINOPHILS ABSOLUTE: 0.9 K/UL (ref 0–0.6)
EOSINOPHILS RELATIVE PERCENT: 5.4 %
GFR AFRICAN AMERICAN: >60
GFR NON-AFRICAN AMERICAN: >60
GLOBULIN: 4.1 G/DL
GLUCOSE BLD-MCNC: 220 MG/DL (ref 70–99)
HCT VFR BLD CALC: 36.7 % (ref 36–48)
HEMOGLOBIN: 11.5 G/DL (ref 12–16)
LACTIC ACID: 2.4 MMOL/L (ref 0.4–2)
LACTIC ACID: 3.3 MMOL/L (ref 0.4–2)
LYMPHOCYTES ABSOLUTE: 3 K/UL (ref 1–5.1)
LYMPHOCYTES RELATIVE PERCENT: 17.8 %
MAGNESIUM: 1.9 MG/DL (ref 1.8–2.4)
MCH RBC QN AUTO: 25.6 PG (ref 26–34)
MCHC RBC AUTO-ENTMCNC: 31.4 G/DL (ref 31–36)
MCV RBC AUTO: 81.7 FL (ref 80–100)
MONOCYTES ABSOLUTE: 1.1 K/UL (ref 0–1.3)
MONOCYTES RELATIVE PERCENT: 6.3 %
NEUTROPHILS ABSOLUTE: 12 K/UL (ref 1.7–7.7)
NEUTROPHILS RELATIVE PERCENT: 70.1 %
PDW BLD-RTO: 16.6 % (ref 12.4–15.4)
PLATELET # BLD: 281 K/UL (ref 135–450)
PMV BLD AUTO: 7.7 FL (ref 5–10.5)
POTASSIUM SERPL-SCNC: 3.3 MMOL/L (ref 3.5–5.1)
RBC # BLD: 4.49 M/UL (ref 4–5.2)
SODIUM BLD-SCNC: 139 MMOL/L (ref 136–145)
TOTAL PROTEIN: 7.5 G/DL (ref 6.4–8.2)
TROPONIN: <0.01 NG/ML
WBC # BLD: 17.1 K/UL (ref 4–11)

## 2019-08-16 PROCEDURE — 80053 COMPREHEN METABOLIC PANEL: CPT

## 2019-08-16 PROCEDURE — 83605 ASSAY OF LACTIC ACID: CPT

## 2019-08-16 PROCEDURE — 2580000003 HC RX 258: Performed by: EMERGENCY MEDICINE

## 2019-08-16 PROCEDURE — 96365 THER/PROPH/DIAG IV INF INIT: CPT

## 2019-08-16 PROCEDURE — 93005 ELECTROCARDIOGRAM TRACING: CPT | Performed by: EMERGENCY MEDICINE

## 2019-08-16 PROCEDURE — 87040 BLOOD CULTURE FOR BACTERIA: CPT

## 2019-08-16 PROCEDURE — 84484 ASSAY OF TROPONIN QUANT: CPT

## 2019-08-16 PROCEDURE — 71046 X-RAY EXAM CHEST 2 VIEWS: CPT

## 2019-08-16 PROCEDURE — 36415 COLL VENOUS BLD VENIPUNCTURE: CPT

## 2019-08-16 PROCEDURE — 93010 ELECTROCARDIOGRAM REPORT: CPT | Performed by: INTERNAL MEDICINE

## 2019-08-16 PROCEDURE — 6360000002 HC RX W HCPCS: Performed by: EMERGENCY MEDICINE

## 2019-08-16 PROCEDURE — 85025 COMPLETE CBC W/AUTO DIFF WBC: CPT

## 2019-08-16 PROCEDURE — 99291 CRITICAL CARE FIRST HOUR: CPT

## 2019-08-16 PROCEDURE — 6370000000 HC RX 637 (ALT 250 FOR IP): Performed by: EMERGENCY MEDICINE

## 2019-08-16 PROCEDURE — 83735 ASSAY OF MAGNESIUM: CPT

## 2019-08-16 RX ORDER — IPRATROPIUM BROMIDE AND ALBUTEROL SULFATE 2.5; .5 MG/3ML; MG/3ML
1 SOLUTION RESPIRATORY (INHALATION) ONCE
Status: COMPLETED | OUTPATIENT
Start: 2019-08-16 | End: 2019-08-16

## 2019-08-16 RX ORDER — 0.9 % SODIUM CHLORIDE 0.9 %
30 INTRAVENOUS SOLUTION INTRAVENOUS ONCE
Status: COMPLETED | OUTPATIENT
Start: 2019-08-16 | End: 2019-08-16

## 2019-08-16 RX ORDER — LEVOFLOXACIN 5 MG/ML
750 INJECTION, SOLUTION INTRAVENOUS ONCE
Status: COMPLETED | OUTPATIENT
Start: 2019-08-16 | End: 2019-08-16

## 2019-08-16 RX ADMIN — SODIUM CHLORIDE 1000 ML: 9 INJECTION, SOLUTION INTRAVENOUS at 14:38

## 2019-08-16 RX ADMIN — IPRATROPIUM BROMIDE AND ALBUTEROL SULFATE 1 AMPULE: .5; 3 SOLUTION RESPIRATORY (INHALATION) at 13:14

## 2019-08-16 RX ADMIN — LEVOFLOXACIN 750 MG: 5 INJECTION, SOLUTION INTRAVENOUS at 14:38

## 2019-08-16 ASSESSMENT — ENCOUNTER SYMPTOMS
TROUBLE SWALLOWING: 0
VOICE CHANGE: 0
STRIDOR: 0
ABDOMINAL PAIN: 0
VOMITING: 0
COLOR CHANGE: 0
NAUSEA: 0
FACIAL SWELLING: 0
COUGH: 1
SHORTNESS OF BREATH: 1
WHEEZING: 0

## 2019-08-16 NOTE — ED PROVIDER NOTES
procedures and treating other patients    Critical care was necessary to treat or prevent imminent or life-threatening deterioration of the following conditions:  Sepsis and respiratory failure    Critical care was time spent personally by me on the following activities:  Ordering and performing treatments and interventions, development of treatment plan with patient or surrogate, ordering and review of laboratory studies, discussions with consultants, ordering and review of radiographic studies, pulse oximetry, evaluation of patient's response to treatment, re-evaluation of patient's condition, examination of patient, review of old charts and obtaining history from patient or surrogate        FINAL IMPRESSION      1. Recurrent pneumonia    2. Sepsis, due to unspecified organism ([facility] [address]    3. Lung disease          DISPOSITION/PLAN   DISPOSITION Transfer to          (Please note that portions of this note were completed with a voice recognition program.  Efforts were made to edit the dictations but occasionally words aremis-transcribed. )    Daina Wilkins MD (electronically signed)  Attending Emergency Physician           Daina Wilkins MD  08/16/19 3369

## 2019-08-16 NOTE — TELEPHONE ENCOUNTER
No chest pain but pain in the back, and she was not feeling well.  State they already advised her to go to the ER

## 2019-08-16 NOTE — DISCHARGE INSTR - COC
Continuity of Care Form    Patient Name: Chantale Dominique   :  1955  MRN:  [id number]    Admit date:  2019  Discharge date:  ***    Code Status Order: Prior   Advance Directives:     Admitting Physician:  No admitting provider for patient encounter. PCP: Thi Whitman MD    Discharging Nurse: [facility] Unit/Room#:   Discharging Unit Phone Number: ***    Emergency Contact:   Extended Emergency Contact Information  Primary Emergency Contact: Daina LOAIZA  Address: [address] [place], [place] [address]Angélica garcia [address] [place] [address] Phone: [phone]  Relation: Spouse  Secondary Emergency Contact: Andrea Cárdenas [facility] Phone: [phone]  Relation: Child    Past Surgical History:  Past Surgical History:   Procedure Laterality Date    BRONCHOSCOPY N/A 2019    EBUS WF W/ANES.  performed by Talita Ng MD at [facility]  2019    BRONCHOSCOPY/TRANSBRONCHIAL NEEDLE BIOPSY performed by Talita Ng MD at [facility]  2019    BRONCHOSCOPY/TRANSBRONCHIAL LUNG BIOPSY performed by Talita Ng MD at [facility]  2019    BRONCHOSCOPY ALVEOLAR LAVAGE performed by Talita Ng MD at [facility]  07/10/2019    BRONCHOSCOPY N/A 7/10/2019    BRONCHOSCOPY ALVEOLAR LAVAGE performed by Mirna Hare MD at [facility] Bilateral 2019    BRONCHOSCOPY N/A 2019    BRONCHOSCOPY ALVEOLAR LAVAGE performed by Phillip Khan MD at [address] [place] [address], TOTAL ABDOMINAL      partial       Immunization History:   Immunization History   Administered Date(s) Administered    Influenza Vaccine, unspecified formulation 2016    Influenza Virus Vaccine 01/15/2016, 2016    Pneumococcal Conjugate 13-valent ([id number]) 2019    Pneumococcal Polysaccharide (Nxjlopxyb59) 2013, 2014 202 lb (91.6 kg)   SpO2 95%   BMI 35.78 kg/m²     Last documented pain score (0-10 scale):    Last Weight:   Wt Readings from Last 1 Encounters:   19 202 lb (91.6 kg)     Mental Status:  {IP PT MENTAL STATUS:74842}    IV Access:  { CAROLANN IV ACCESS:550281585}    Nursing Mobility/ADLs:  Walking   {CHP DME PZZC:892610588}  Transfer  {CHP DME DANGELO:631722539}  Bathing  {CHP DME BMWD:342055220}  Dressing  {CHP DME EZVA:599394158}  Toileting  {CHP DME QLTM:295439168}  Feeding  {CHP DME FJDI:401931977}  Med Admin  {P DME SYXR:964149296}  Med Delivery   { CAROLANN MED Delivery:005958796}    Wound Care Documentation and Therapy:        Elimination:  Continence:   · Bowel: {YES / VU:77031}  · Bladder: {YES / VR:86969}  Urinary Catheter: {Urinary Catheter:606467955}   Colostomy/Ileostomy/Ileal Conduit: {YES / GO:66621}       Date of Last BM: ***  No intake or output data in the 24 hours ending 19 1513  No intake/output data recorded.     Safety Concerns:     [address] Safety Concerns:480202807}    Impairments/Disabilities:      [address] Impairments/Disabilities:103365357}    Nutrition Therapy:  Current Nutrition Therapy:   [address] Diet List:007084964}    Routes of Feeding: {[facility] DME Other Feedings:420486224}  Liquids: {Slp liquid thickness:33330}  Daily Fluid Restriction: {CHP DME Yes amt example:978094484}  Last Modified Barium Swallow with Video (Video Swallowing Test): {Done Not Done [facility]:210600064}    Treatments at the Time of Hospital Discharge:   Respiratory Treatments: ***  Oxygen Therapy:  {Therapy; copd oxygen:25064}  Ventilator:    {[facility] Vent CPIW:984795692}    Rehab Therapies: {THERAPEUTIC INTERVENTION:7490413356}  Weight Bearing Status/Restrictions: [address]  Weight Bearin}  Other Medical Equipment (for information only, NOT a DME order):  {EQUIPMENT:568922173}  Other Treatments: ***    Patient's personal belongings (please select all that are sent with patient):  {HUGO DME

## 2019-08-16 NOTE — TELEPHONE ENCOUNTER
Called aj with Yovani Corral at [facility] [address] [facility] office states she is trying to reach patient is trying to sukumar for 8/22/19. Will f/u on Monday to check the status.

## 2019-08-16 NOTE — ED NOTES
IV ABX started and IVF started. VSS and updated and pt remains on monitor and O2. Call light within reach, Family at bedside.       Jane Medina RN  08/16/19 0799

## 2019-08-21 LAB
BLOOD CULTURE, ROUTINE: NORMAL
CULTURE, BLOOD 2: NORMAL

## 2019-08-26 ENCOUNTER — CARE COORDINATION (OUTPATIENT)
Dept: CASE MANAGEMENT | Age: 64
End: 2019-08-26

## 2019-08-26 DIAGNOSIS — R07.9 CHEST PAIN, UNSPECIFIED TYPE: Primary | ICD-10-CM

## 2019-08-27 LAB
AFB CULTURE (MYCOBACTERIA): NORMAL
AFB CULTURE (MYCOBACTERIA): NORMAL
AFB SMEAR: NORMAL
AFB SMEAR: NORMAL

## 2019-08-28 ENCOUNTER — CARE COORDINATION (OUTPATIENT)
Dept: CASE MANAGEMENT | Age: 64
End: 2019-08-28

## 2019-09-03 ENCOUNTER — OFFICE VISIT (OUTPATIENT)
Dept: PULMONOLOGY | Age: 64
End: 2019-09-03
Payer: COMMERCIAL

## 2019-09-03 VITALS
HEART RATE: 98 BPM | DIASTOLIC BLOOD PRESSURE: 62 MMHG | HEIGHT: 63 IN | BODY MASS INDEX: 36.5 KG/M2 | WEIGHT: 206 LBS | OXYGEN SATURATION: 98 % | SYSTOLIC BLOOD PRESSURE: 122 MMHG | RESPIRATION RATE: 18 BRPM

## 2019-09-03 DIAGNOSIS — J96.01 ACUTE HYPOXEMIC RESPIRATORY FAILURE (HCC): ICD-10-CM

## 2019-09-03 DIAGNOSIS — J84.116 CRYPTOGENIC ORGANIZING PNEUMONIA (HCC): ICD-10-CM

## 2019-09-03 DIAGNOSIS — D84.9 IMMUNOSUPPRESSION (HCC): Primary | ICD-10-CM

## 2019-09-03 PROBLEM — J47.1 BRONCHIECTASIS WITH ACUTE EXACERBATION (HCC): Status: RESOLVED | Noted: 2019-06-19 | Resolved: 2019-09-03

## 2019-09-03 PROBLEM — R06.03 ACUTE RESPIRATORY DISTRESS: Status: RESOLVED | Noted: 2019-06-04 | Resolved: 2019-09-03

## 2019-09-03 PROBLEM — J96.21 ACUTE AND CHRONIC RESPIRATORY FAILURE WITH HYPOXIA (HCC): Status: RESOLVED | Noted: 2019-06-04 | Resolved: 2019-09-03

## 2019-09-03 PROBLEM — J18.9 PNEUMONIA DUE TO ORGANISM: Status: RESOLVED | Noted: 2019-06-26 | Resolved: 2019-09-03

## 2019-09-03 PROCEDURE — G8417 CALC BMI ABV UP PARAM F/U: HCPCS | Performed by: INTERNAL MEDICINE

## 2019-09-03 PROCEDURE — 1123F ACP DISCUSS/DSCN MKR DOCD: CPT | Performed by: INTERNAL MEDICINE

## 2019-09-03 PROCEDURE — 3017F COLORECTAL CA SCREEN DOC REV: CPT | Performed by: INTERNAL MEDICINE

## 2019-09-03 PROCEDURE — 4040F PNEUMOC VAC/ADMIN/RCVD: CPT | Performed by: INTERNAL MEDICINE

## 2019-09-03 PROCEDURE — 1090F PRES/ABSN URINE INCON ASSESS: CPT | Performed by: INTERNAL MEDICINE

## 2019-09-03 PROCEDURE — 1036F TOBACCO NON-USER: CPT | Performed by: INTERNAL MEDICINE

## 2019-09-03 PROCEDURE — 99215 OFFICE O/P EST HI 40 MIN: CPT | Performed by: INTERNAL MEDICINE

## 2019-09-03 PROCEDURE — 1111F DSCHRG MED/CURRENT MED MERGE: CPT | Performed by: INTERNAL MEDICINE

## 2019-09-03 PROCEDURE — G8400 PT W/DXA NO RESULTS DOC: HCPCS | Performed by: INTERNAL MEDICINE

## 2019-09-03 PROCEDURE — G8427 DOCREV CUR MEDS BY ELIG CLIN: HCPCS | Performed by: INTERNAL MEDICINE

## 2019-09-04 ENCOUNTER — CARE COORDINATION (OUTPATIENT)
Dept: CASE MANAGEMENT | Age: 64
End: 2019-09-04

## 2019-09-08 LAB
FUNGUS (MYCOLOGY) CULTURE: ABNORMAL
FUNGUS (MYCOLOGY) CULTURE: ABNORMAL
FUNGUS STAIN: ABNORMAL
FUNGUS STAIN: ABNORMAL
ORGANISM: ABNORMAL
ORGANISM: ABNORMAL

## 2019-09-09 ENCOUNTER — OFFICE VISIT (OUTPATIENT)
Dept: FAMILY MEDICINE CLINIC | Age: 64
End: 2019-09-09
Payer: COMMERCIAL

## 2019-09-09 VITALS
WEIGHT: 210.4 LBS | TEMPERATURE: 95.4 F | BODY MASS INDEX: 37.27 KG/M2 | DIASTOLIC BLOOD PRESSURE: 74 MMHG | SYSTOLIC BLOOD PRESSURE: 136 MMHG | HEART RATE: 109 BPM | OXYGEN SATURATION: 93 %

## 2019-09-09 DIAGNOSIS — E11.9 TYPE 2 DIABETES MELLITUS WITHOUT COMPLICATION, WITHOUT LONG-TERM CURRENT USE OF INSULIN (HCC): Primary | ICD-10-CM

## 2019-09-09 DIAGNOSIS — F32.0 MILD SINGLE CURRENT EPISODE OF MAJOR DEPRESSIVE DISORDER (HCC): ICD-10-CM

## 2019-09-09 DIAGNOSIS — E03.9 ACQUIRED HYPOTHYROIDISM: ICD-10-CM

## 2019-09-09 DIAGNOSIS — G43.009 MIGRAINE WITHOUT AURA AND WITHOUT STATUS MIGRAINOSUS, NOT INTRACTABLE: ICD-10-CM

## 2019-09-09 DIAGNOSIS — F51.04 PSYCHOPHYSIOLOGICAL INSOMNIA: ICD-10-CM

## 2019-09-09 DIAGNOSIS — J18.9 PNEUMONIA OF BOTH LUNGS DUE TO INFECTIOUS ORGANISM, UNSPECIFIED PART OF LUNG: ICD-10-CM

## 2019-09-09 DIAGNOSIS — I48.20 ATRIAL FIBRILLATION, CHRONIC (HCC): ICD-10-CM

## 2019-09-09 DIAGNOSIS — Z09 HOSPITAL DISCHARGE FOLLOW-UP: ICD-10-CM

## 2019-09-09 DIAGNOSIS — F41.9 ANXIETY: ICD-10-CM

## 2019-09-09 DIAGNOSIS — J96.01 ACUTE RESPIRATORY FAILURE WITH HYPOXIA (HCC): ICD-10-CM

## 2019-09-09 DIAGNOSIS — J95.811 POSTPROCEDURAL PNEUMOTHORAX: ICD-10-CM

## 2019-09-09 DIAGNOSIS — R05.9 COUGH: ICD-10-CM

## 2019-09-09 PROBLEM — J44.9 COPD (CHRONIC OBSTRUCTIVE PULMONARY DISEASE) (HCC): Status: ACTIVE | Noted: 2019-08-16

## 2019-09-09 PROCEDURE — G8400 PT W/DXA NO RESULTS DOC: HCPCS | Performed by: FAMILY MEDICINE

## 2019-09-09 PROCEDURE — 1111F DSCHRG MED/CURRENT MED MERGE: CPT | Performed by: FAMILY MEDICINE

## 2019-09-09 PROCEDURE — 1036F TOBACCO NON-USER: CPT | Performed by: FAMILY MEDICINE

## 2019-09-09 PROCEDURE — G8427 DOCREV CUR MEDS BY ELIG CLIN: HCPCS | Performed by: FAMILY MEDICINE

## 2019-09-09 PROCEDURE — 2022F DILAT RTA XM EVC RTNOPTHY: CPT | Performed by: FAMILY MEDICINE

## 2019-09-09 PROCEDURE — 3045F PR MOST RECENT HEMOGLOBIN A1C LEVEL 7.0-9.0%: CPT | Performed by: FAMILY MEDICINE

## 2019-09-09 PROCEDURE — G8417 CALC BMI ABV UP PARAM F/U: HCPCS | Performed by: FAMILY MEDICINE

## 2019-09-09 PROCEDURE — 99215 OFFICE O/P EST HI 40 MIN: CPT | Performed by: FAMILY MEDICINE

## 2019-09-09 PROCEDURE — 1123F ACP DISCUSS/DSCN MKR DOCD: CPT | Performed by: FAMILY MEDICINE

## 2019-09-09 PROCEDURE — 3017F COLORECTAL CA SCREEN DOC REV: CPT | Performed by: FAMILY MEDICINE

## 2019-09-09 PROCEDURE — 4040F PNEUMOC VAC/ADMIN/RCVD: CPT | Performed by: FAMILY MEDICINE

## 2019-09-09 PROCEDURE — 1090F PRES/ABSN URINE INCON ASSESS: CPT | Performed by: FAMILY MEDICINE

## 2019-09-09 RX ORDER — LEVOTHYROXINE SODIUM 0.15 MG/1
TABLET ORAL
Qty: 30 TABLET | Refills: 3 | Status: SHIPPED | OUTPATIENT
Start: 2019-09-09 | End: 2020-01-10

## 2019-09-09 RX ORDER — TRAZODONE HYDROCHLORIDE 50 MG/1
TABLET ORAL
Qty: 30 TABLET | Refills: 5 | Status: SHIPPED | OUTPATIENT
Start: 2019-09-09 | End: 2020-05-04

## 2019-09-09 RX ORDER — AMITRIPTYLINE HYDROCHLORIDE 25 MG/1
25 TABLET, FILM COATED ORAL NIGHTLY
Qty: 30 TABLET | Refills: 5 | Status: SHIPPED | OUTPATIENT
Start: 2019-09-09 | End: 2020-05-04

## 2019-09-09 RX ORDER — SERTRALINE HYDROCHLORIDE 100 MG/1
200 TABLET, FILM COATED ORAL DAILY
Qty: 60 TABLET | Refills: 5 | Status: SHIPPED | OUTPATIENT
Start: 2019-09-09 | End: 2020-05-22 | Stop reason: SDUPTHER

## 2019-09-09 RX ORDER — DEXTROMETHORPHAN HYDROBROMIDE AND PROMETHAZINE HYDROCHLORIDE 15; 6.25 MG/5ML; MG/5ML
5 SYRUP ORAL 4 TIMES DAILY PRN
Qty: 240 ML | Refills: 0 | Status: SHIPPED | OUTPATIENT
Start: 2019-09-09 | End: 2019-11-15

## 2019-09-09 RX ORDER — MIRTAZAPINE 30 MG/1
30 TABLET, FILM COATED ORAL NIGHTLY
Qty: 30 TABLET | Refills: 2 | Status: SHIPPED | OUTPATIENT
Start: 2019-09-09 | End: 2019-12-05 | Stop reason: SDUPTHER

## 2019-09-09 ASSESSMENT — PATIENT HEALTH QUESTIONNAIRE - PHQ9
1. LITTLE INTEREST OR PLEASURE IN DOING THINGS: 0
SUM OF ALL RESPONSES TO PHQ9 QUESTIONS 1 & 2: 0
SUM OF ALL RESPONSES TO PHQ QUESTIONS 1-9: 0
SUM OF ALL RESPONSES TO PHQ QUESTIONS 1-9: 0
2. FEELING DOWN, DEPRESSED OR HOPELESS: 0

## 2019-09-09 NOTE — PROGRESS NOTES
Tape Elie Blazing Tape] Rash       Medications listed as ordered at the time of discharge from hospital   [facility]   Home Medication Instructions MARKOS:    Printed on:09/09/19 5815   Medication Information                      acetaminophen (TYLENOL) 500 MG tablet  Take 1,000 mg by mouth every 4 hours as needed              albuterol (PROVENTIL) (2.5 MG/3ML) 0.083% nebulizer solution  Take 3 mLs by nebulization every 6 hours as needed for Wheezing             albuterol sulfate HFA (PROVENTIL HFA) 108 (90 Base) MCG/ACT inhaler  Inhale 2 puffs into the lungs every 6 hours as needed for Wheezing             albuterol sulfate  (90 Base) MCG/ACT inhaler  Inhale 2 puffs into the lungs 4 times daily as needed for Wheezing             amitriptyline (ELAVIL) 25 MG tablet  Take 1 tablet by mouth nightly             Azelastine-Fluticasone (DYMISTA) 137-50 MCG/ACT SUSP  by Nasal route daily              benzonatate (TESSALON) 100 MG capsule  Take 1 capsule by mouth 3 times daily as needed for Cough             Esomeprazole Magnesium (NEXIUM 24HR PO)  Take by mouth 2 times daily              furosemide (LASIX) 40 MG tablet  Take 1 tablet by mouth daily             hydrOXYzine (ATARAX) 10 MG tablet  Take 10 mg by mouth 3 times daily as needed for Itching             ipratropium-albuterol (DUONEB) 0.5-2.5 (3) MG/3ML SOLN nebulizer solution  Inhale 3 mLs into the lungs every 6 hours as needed for Shortness of Breath             levothyroxine (SYNTHROID) 150 MCG tablet  TAKE 1 TABLET BY MOUTH EVERY DAY             LORazepam (ATIVAN) 0.5 MG tablet  Take 0.5 mg by mouth every 6 hours as needed for Anxiety.              mirtazapine (REMERON) 30 MG tablet  TAKE 1 TABLET BY MOUTH EVERY DAY AT NIGHT             nadolol (CORGARD) 20 MG tablet  Take 1 tablet by mouth daily             OXYGEN  Inhale 3 L into the lungs             potassium chloride (KLOR-CON M) 20 MEQ extended release tablet  Take 1 tablet by mouth daily 0  - [place] DISCHARGE MEDS RECONCILED W/ CURRENT OUTPATIENT MED LIST    7. Mild single current episode of major depressive disorder (HCC)    - sertraline (ZOLOFT) 100 MG tablet; Take 2 tablets by mouth daily  Dispense: 60 tablet; Refill: 5    8. Anxiety    - sertraline (ZOLOFT) 100 MG tablet; Take 2 tablets by mouth daily  Dispense: 60 tablet; Refill: 5  - mirtazapine (REMERON) 30 MG tablet; Take 1 tablet by mouth nightly  Dispense: 30 tablet; Refill: 2    9. Psychophysiological insomnia    - traZODone (DESYREL) 50 MG tablet; TAKE 1 TABLET BY MOUTH NIGHTLY  Dispense: 30 tablet; Refill: 5    10. Migraine without aura and without status migrainosus, not intractable    - amitriptyline (ELAVIL) 25 MG tablet; Take 1 tablet by mouth nightly  Dispense: 30 tablet; Refill: 5    11. Acquired hypothyroidism    - levothyroxine (SYNTHROID) 150 MCG tablet; TAKE 1 TABLET BY MOUTH EVERY DAY  Dispense: 30 tablet; Refill: 3        Medical Decision Making: moderate complexity    Greater than 50% of this 45-minute visit spent in counseling on diabetes, diabetic diet, medications for diabetes, steroids, oxygen, and anxiety.

## 2019-09-09 NOTE — PATIENT INSTRUCTIONS
blood sugar levels. A registered dietitian or diabetes educator can help you plan how much carbohydrate to include in each meal and snack. A guideline for your daily amount of carbohydrate is:  · 45 to 60 grams at each meal. That's about the same as 3 to 4 carbohydrate servings. · 15 to 20 grams at each snack. That's about the same as 1 carbohydrate serving. The Nutrition Facts label on packaged foods tells you how much carbohydrate is in a serving of the food. First, look at the serving size on the food label. Is that the amount you eat in a serving? All of the nutrition information on a food label is based on that serving size. So if you eat more or less than that, you'll need to adjust the other numbers. Total carbohydrate is the next thing you need to look for on the label. If you count carbohydrate servings, one serving of carbohydrate is 15 grams. For foods that don't come with labels, such as fresh fruits and vegetables, you'll need a guide that lists carbohydrate in these foods. Ask your doctor, dietitian, or diabetes educator about books or other nutrition guides you can use. If you take insulin, you need to know how many grams of carbohydrate are in a meal. This lets you know how much rapid-acting insulin to take before you eat. If you use an insulin pump, you get a constant rate of insulin during the day. So the pump must be programmed at meals to give you extra insulin to cover the rise in blood sugar after meals. When you know how much carbohydrate you will eat, you can take the right amount of insulin. Or, if you always use the same amount of insulin, you need to make sure that you eat the same amount of carbohydrate at meals. If you need more help to understand carbohydrate counting and food labels, ask your doctor, dietitian, or diabetes educator. How do you get started with meal planning? Here are some tips to get started:  · Plan your meals a week at a time.  Don't forget to include snacks too.  · Use cookbooks or online recipes to plan several main meals. Plan some quick meals for busy nights. You also can double some recipes that freeze well. Then you can save half for other busy nights when you don't have time to cook. · Make sure you have the ingredients you need for your recipes. If you're running low on basic items, put these items on your shopping list too. · List foods that you use to make breakfasts, lunches, and snacks. List plenty of fruits and vegetables. · Post this list on the refrigerator. Add to it as you think of more things you need. · Take the list to the store to do your weekly shopping. Follow-up care is a key part of your treatment and safety. Be sure to make and go to all appointments, and call your doctor if you are having problems. It's also a good idea to know your test results and keep a list of the medicines you take. Where can you learn more? Go to [email or web address] org and sign in to your [employer] account. Enter H174 in the Casetext box to learn more about \"Learning About Meal Planning for Diabetes. \"     If you do not have an account, please click on the \"Sign Up Now\" link. Current as of: July 25, 2018  Content Version: 12.1  © 6730-7974 Healthwise, Incorporated. Care instructions adapted under license by [facility] ([facility]). If you have questions about a medical condition or this instruction, always ask your healthcare professional. [place] [address] any warranty or liability for your use of this information.

## 2019-09-13 ENCOUNTER — TELEPHONE (OUTPATIENT)
Dept: PULMONOLOGY | Age: 64
End: 2019-09-13

## 2019-09-16 ENCOUNTER — TELEPHONE (OUTPATIENT)
Dept: FAMILY MEDICINE CLINIC | Age: 64
End: 2019-09-16

## 2019-09-19 RX ORDER — FUROSEMIDE 40 MG/1
40 TABLET ORAL DAILY
Qty: 30 TABLET | Refills: 3 | Status: SHIPPED | OUTPATIENT
Start: 2019-09-19 | End: 2020-01-10

## 2019-09-19 RX ORDER — POTASSIUM CHLORIDE 20 MEQ/1
20 TABLET, EXTENDED RELEASE ORAL DAILY
Qty: 30 TABLET | Refills: 3 | Status: SHIPPED | OUTPATIENT
Start: 2019-09-19 | End: 2020-01-10

## 2019-09-27 ENCOUNTER — TELEPHONE (OUTPATIENT)
Dept: FAMILY MEDICINE CLINIC | Age: 64
End: 2019-09-27

## 2019-09-30 ENCOUNTER — CARE COORDINATION (OUTPATIENT)
Dept: CARE COORDINATION | Age: 64
End: 2019-09-30

## 2019-10-01 RX ORDER — SULFAMETHOXAZOLE AND TRIMETHOPRIM 800; 160 MG/1; MG/1
TABLET ORAL
Qty: 12 TABLET | Refills: 1 | Status: SHIPPED | OUTPATIENT
Start: 2019-10-01 | End: 2019-11-14 | Stop reason: ALTCHOICE

## 2019-10-04 ENCOUNTER — APPOINTMENT (OUTPATIENT)
Dept: GENERAL RADIOLOGY | Age: 64
End: 2019-10-04
Payer: COMMERCIAL

## 2019-10-04 ENCOUNTER — HOSPITAL ENCOUNTER (EMERGENCY)
Age: 64
Discharge: HOME | End: 2019-10-04
Attending: EMERGENCY MEDICINE
Payer: COMMERCIAL

## 2019-10-04 VITALS
RESPIRATION RATE: 26 BRPM | BODY MASS INDEX: 35.79 KG/M2 | SYSTOLIC BLOOD PRESSURE: 139 MMHG | DIASTOLIC BLOOD PRESSURE: 100 MMHG | OXYGEN SATURATION: 93 % | HEIGHT: 63 IN | TEMPERATURE: 98.2 F | HEART RATE: 123 BPM | WEIGHT: 202 LBS

## 2019-10-04 DIAGNOSIS — R05.9 COUGH: Primary | ICD-10-CM

## 2019-10-04 LAB
A/G RATIO: 1.1 (ref 1.1–2.2)
ALBUMIN SERPL-MCNC: 4.1 G/DL (ref 3.4–5)
ALP BLD-CCNC: 99 U/L (ref 40–129)
ALT SERPL-CCNC: 22 U/L (ref 10–40)
ANION GAP SERPL CALCULATED.3IONS-SCNC: 22 MMOL/L (ref 3–16)
AST SERPL-CCNC: 33 U/L (ref 15–37)
BASOPHILS ABSOLUTE: 0.1 K/UL (ref 0–0.2)
BASOPHILS RELATIVE PERCENT: 0.5 %
BILIRUB SERPL-MCNC: 0.4 MG/DL (ref 0–1)
BUN BLDV-MCNC: 18 MG/DL (ref 7–20)
CALCIUM SERPL-MCNC: 9.5 MG/DL (ref 8.3–10.6)
CHLORIDE BLD-SCNC: 98 MMOL/L (ref 99–110)
CO2: 19 MMOL/L (ref 21–32)
CREAT SERPL-MCNC: 0.7 MG/DL (ref 0.6–1.2)
EOSINOPHILS ABSOLUTE: 0.2 K/UL (ref 0–0.6)
EOSINOPHILS RELATIVE PERCENT: 1.1 %
GFR AFRICAN AMERICAN: >60
GFR NON-AFRICAN AMERICAN: >60
GLOBULIN: 3.9 G/DL
GLUCOSE BLD-MCNC: 372 MG/DL (ref 70–99)
HCT VFR BLD CALC: 37.5 % (ref 36–48)
HEMOGLOBIN: 11.7 G/DL (ref 12–16)
LYMPHOCYTES ABSOLUTE: 1.2 K/UL (ref 1–5.1)
LYMPHOCYTES RELATIVE PERCENT: 7.7 %
MCH RBC QN AUTO: 24.1 PG (ref 26–34)
MCHC RBC AUTO-ENTMCNC: 31.1 G/DL (ref 31–36)
MCV RBC AUTO: 77.7 FL (ref 80–100)
MONOCYTES ABSOLUTE: 0.3 K/UL (ref 0–1.3)
MONOCYTES RELATIVE PERCENT: 2.1 %
NEUTROPHILS ABSOLUTE: 13.9 K/UL (ref 1.7–7.7)
NEUTROPHILS RELATIVE PERCENT: 88.6 %
PDW BLD-RTO: 17.4 % (ref 12.4–15.4)
PLATELET # BLD: 285 K/UL (ref 135–450)
PMV BLD AUTO: 7.6 FL (ref 5–10.5)
POTASSIUM REFLEX MAGNESIUM: 4.4 MMOL/L (ref 3.5–5.1)
RBC # BLD: 4.83 M/UL (ref 4–5.2)
SODIUM BLD-SCNC: 139 MMOL/L (ref 136–145)
TOTAL PROTEIN: 8 G/DL (ref 6.4–8.2)
WBC # BLD: 15.7 K/UL (ref 4–11)

## 2019-10-04 PROCEDURE — 93005 ELECTROCARDIOGRAM TRACING: CPT | Performed by: EMERGENCY MEDICINE

## 2019-10-04 PROCEDURE — 71045 X-RAY EXAM CHEST 1 VIEW: CPT

## 2019-10-04 PROCEDURE — 85025 COMPLETE CBC W/AUTO DIFF WBC: CPT

## 2019-10-04 PROCEDURE — 99285 EMERGENCY DEPT VISIT HI MDM: CPT

## 2019-10-04 PROCEDURE — 6370000000 HC RX 637 (ALT 250 FOR IP): Performed by: EMERGENCY MEDICINE

## 2019-10-04 PROCEDURE — 36415 COLL VENOUS BLD VENIPUNCTURE: CPT

## 2019-10-04 PROCEDURE — 80053 COMPREHEN METABOLIC PANEL: CPT

## 2019-10-04 RX ORDER — IPRATROPIUM BROMIDE AND ALBUTEROL SULFATE 2.5; .5 MG/3ML; MG/3ML
1 SOLUTION RESPIRATORY (INHALATION)
Status: DISCONTINUED | OUTPATIENT
Start: 2019-10-04 | End: 2019-10-04 | Stop reason: HOSPADM

## 2019-10-04 RX ORDER — PREDNISONE 10 MG/1
10 TABLET ORAL ONCE
Status: COMPLETED | OUTPATIENT
Start: 2019-10-04 | End: 2019-10-04

## 2019-10-04 RX ADMIN — PREDNISONE 10 MG: 10 TABLET ORAL at 16:37

## 2019-10-04 RX ADMIN — IPRATROPIUM BROMIDE AND ALBUTEROL SULFATE 1 AMPULE: .5; 3 SOLUTION RESPIRATORY (INHALATION) at 16:37

## 2019-10-04 ASSESSMENT — ENCOUNTER SYMPTOMS
VOMITING: 0
NAUSEA: 0
ABDOMINAL PAIN: 0
SORE THROAT: 0
SHORTNESS OF BREATH: 1
COUGH: 1
BACK PAIN: 0

## 2019-10-05 LAB
EKG ATRIAL RATE: 107 BPM
EKG DIAGNOSIS: NORMAL
EKG P AXIS: 57 DEGREES
EKG P-R INTERVAL: 134 MS
EKG Q-T INTERVAL: 328 MS
EKG QRS DURATION: 76 MS
EKG QTC CALCULATION (BAZETT): 437 MS
EKG R AXIS: 67 DEGREES
EKG T AXIS: 21 DEGREES
EKG VENTRICULAR RATE: 107 BPM

## 2019-10-05 PROCEDURE — 93010 ELECTROCARDIOGRAM REPORT: CPT | Performed by: INTERNAL MEDICINE

## 2019-10-09 DIAGNOSIS — J84.10 PULMONARY FIBROSIS (HCC): Primary | ICD-10-CM

## 2019-10-10 ENCOUNTER — OFFICE VISIT (OUTPATIENT)
Dept: PULMONOLOGY | Age: 64
End: 2019-10-10
Payer: COMMERCIAL

## 2019-10-10 ENCOUNTER — HOSPITAL ENCOUNTER (OUTPATIENT)
Dept: PULMONOLOGY | Age: 64
Discharge: HOME | End: 2019-10-10
Payer: COMMERCIAL

## 2019-10-10 VITALS
OXYGEN SATURATION: 98 % | SYSTOLIC BLOOD PRESSURE: 132 MMHG | WEIGHT: 209 LBS | BODY MASS INDEX: 37.03 KG/M2 | RESPIRATION RATE: 16 BRPM | HEART RATE: 96 BPM | DIASTOLIC BLOOD PRESSURE: 82 MMHG | TEMPERATURE: 97.6 F | HEIGHT: 63 IN

## 2019-10-10 DIAGNOSIS — J84.116 CRYPTOGENIC ORGANIZING PNEUMONIA (HCC): Primary | ICD-10-CM

## 2019-10-10 DIAGNOSIS — J96.11 CHRONIC HYPOXEMIC RESPIRATORY FAILURE (HCC): ICD-10-CM

## 2019-10-10 DIAGNOSIS — J84.10 PULMONARY FIBROSIS (HCC): ICD-10-CM

## 2019-10-10 DIAGNOSIS — D84.9 IMMUNOSUPPRESSION (HCC): ICD-10-CM

## 2019-10-10 PROCEDURE — G8417 CALC BMI ABV UP PARAM F/U: HCPCS | Performed by: INTERNAL MEDICINE

## 2019-10-10 PROCEDURE — 4040F PNEUMOC VAC/ADMIN/RCVD: CPT | Performed by: INTERNAL MEDICINE

## 2019-10-10 PROCEDURE — 3017F COLORECTAL CA SCREEN DOC REV: CPT | Performed by: INTERNAL MEDICINE

## 2019-10-10 PROCEDURE — 94618 PULMONARY STRESS TESTING: CPT

## 2019-10-10 PROCEDURE — 99215 OFFICE O/P EST HI 40 MIN: CPT | Performed by: INTERNAL MEDICINE

## 2019-10-10 PROCEDURE — G8400 PT W/DXA NO RESULTS DOC: HCPCS | Performed by: INTERNAL MEDICINE

## 2019-10-10 PROCEDURE — 1123F ACP DISCUSS/DSCN MKR DOCD: CPT | Performed by: INTERNAL MEDICINE

## 2019-10-10 PROCEDURE — 1036F TOBACCO NON-USER: CPT | Performed by: INTERNAL MEDICINE

## 2019-10-10 PROCEDURE — 1090F PRES/ABSN URINE INCON ASSESS: CPT | Performed by: INTERNAL MEDICINE

## 2019-10-10 PROCEDURE — G8484 FLU IMMUNIZE NO ADMIN: HCPCS | Performed by: INTERNAL MEDICINE

## 2019-10-10 PROCEDURE — G8427 DOCREV CUR MEDS BY ELIG CLIN: HCPCS | Performed by: INTERNAL MEDICINE

## 2019-10-11 DIAGNOSIS — J84.10 PULMONARY FIBROSIS (HCC): Primary | ICD-10-CM

## 2019-10-11 RX ORDER — BENZONATATE 200 MG/1
CAPSULE ORAL
Qty: 30 CAPSULE | Refills: 0 | Status: SHIPPED | OUTPATIENT
Start: 2019-10-11 | End: 2019-10-28 | Stop reason: SDUPTHER

## 2019-10-15 ENCOUNTER — TELEPHONE (OUTPATIENT)
Dept: FAMILY MEDICINE CLINIC | Age: 64
End: 2019-10-15

## 2019-10-15 DIAGNOSIS — E11.9 TYPE 2 DIABETES MELLITUS WITHOUT COMPLICATION, WITHOUT LONG-TERM CURRENT USE OF INSULIN (HCC): Primary | ICD-10-CM

## 2019-10-17 ENCOUNTER — CARE COORDINATION (OUTPATIENT)
Dept: CARE COORDINATION | Age: 64
End: 2019-10-17

## 2019-10-21 RX ORDER — PREDNISONE 10 MG/1
20 TABLET ORAL DAILY
Qty: 60 TABLET | Refills: 0 | Status: SHIPPED | OUTPATIENT
Start: 2019-10-21 | End: 2019-12-12

## 2019-10-28 RX ORDER — BENZONATATE 200 MG/1
CAPSULE ORAL
Qty: 30 CAPSULE | Refills: 3 | Status: SHIPPED | OUTPATIENT
Start: 2019-10-28 | End: 2019-11-14 | Stop reason: SDUPTHER

## 2019-10-30 ENCOUNTER — TELEPHONE (OUTPATIENT)
Dept: FAMILY MEDICINE CLINIC | Age: 64
End: 2019-10-30

## 2019-11-07 ENCOUNTER — APPOINTMENT (OUTPATIENT)
Dept: CT IMAGING | Age: 64
End: 2019-11-07
Payer: COMMERCIAL

## 2019-11-07 ENCOUNTER — APPOINTMENT (OUTPATIENT)
Dept: GENERAL RADIOLOGY | Age: 64
End: 2019-11-07
Payer: COMMERCIAL

## 2019-11-07 ENCOUNTER — HOSPITAL ENCOUNTER (EMERGENCY)
Age: 64
Discharge: HOME | End: 2019-11-07
Attending: EMERGENCY MEDICINE
Payer: COMMERCIAL

## 2019-11-07 VITALS
RESPIRATION RATE: 22 BRPM | HEIGHT: 63 IN | SYSTOLIC BLOOD PRESSURE: 130 MMHG | BODY MASS INDEX: 37.03 KG/M2 | OXYGEN SATURATION: 98 % | TEMPERATURE: 98.5 F | HEART RATE: 101 BPM | DIASTOLIC BLOOD PRESSURE: 76 MMHG | WEIGHT: 209 LBS

## 2019-11-07 DIAGNOSIS — R07.89 INTERMITTENT LEFT-SIDED CHEST PAIN: ICD-10-CM

## 2019-11-07 DIAGNOSIS — J98.4 CHRONIC LUNG DISEASE: ICD-10-CM

## 2019-11-07 DIAGNOSIS — R79.89 ELEVATED LACTIC ACID LEVEL: ICD-10-CM

## 2019-11-07 DIAGNOSIS — R05.9 COUGH: ICD-10-CM

## 2019-11-07 DIAGNOSIS — J84.10 PULMONARY FIBROSIS (HCC): Primary | ICD-10-CM

## 2019-11-07 LAB
A/G RATIO: 1 (ref 1.1–2.2)
ALBUMIN SERPL-MCNC: 3.9 G/DL (ref 3.4–5)
ALP BLD-CCNC: 81 U/L (ref 40–129)
ALT SERPL-CCNC: 25 U/L (ref 10–40)
ANION GAP SERPL CALCULATED.3IONS-SCNC: 19 MMOL/L (ref 3–16)
AST SERPL-CCNC: 27 U/L (ref 15–37)
BASOPHILS ABSOLUTE: 0 K/UL (ref 0–0.2)
BASOPHILS RELATIVE PERCENT: 0 %
BILIRUB SERPL-MCNC: 0.3 MG/DL (ref 0–1)
BILIRUBIN URINE: NEGATIVE
BLOOD, URINE: NEGATIVE
BUN BLDV-MCNC: 14 MG/DL (ref 7–20)
CALCIUM SERPL-MCNC: 9.3 MG/DL (ref 8.3–10.6)
CHLORIDE BLD-SCNC: 99 MMOL/L (ref 99–110)
CLARITY: CLEAR
CO2: 24 MMOL/L (ref 21–32)
COLOR: YELLOW
CREAT SERPL-MCNC: 0.6 MG/DL (ref 0.6–1.2)
EOSINOPHILS ABSOLUTE: 0.2 K/UL (ref 0–0.6)
EOSINOPHILS RELATIVE PERCENT: 1.8 %
GFR AFRICAN AMERICAN: >60
GFR NON-AFRICAN AMERICAN: >60
GLOBULIN: 4 G/DL
GLUCOSE BLD-MCNC: 180 MG/DL (ref 70–99)
GLUCOSE URINE: NEGATIVE MG/DL
HCT VFR BLD CALC: 35.9 % (ref 36–48)
HEMOGLOBIN: 10.9 G/DL (ref 12–16)
KETONES, URINE: NEGATIVE MG/DL
LACTIC ACID, SEPSIS: 3.8 MMOL/L (ref 0.4–1.9)
LACTIC ACID, SEPSIS: 4.9 MMOL/L (ref 0.4–1.9)
LACTIC ACID: 5.3 MMOL/L (ref 0.4–2)
LEUKOCYTE ESTERASE, URINE: NEGATIVE
LYMPHOCYTES ABSOLUTE: 1.4 K/UL (ref 1–5.1)
LYMPHOCYTES RELATIVE PERCENT: 9.9 %
MCH RBC QN AUTO: 23.4 PG (ref 26–34)
MCHC RBC AUTO-ENTMCNC: 30.4 G/DL (ref 31–36)
MCV RBC AUTO: 77 FL (ref 80–100)
MICROSCOPIC EXAMINATION: NORMAL
MONOCYTES ABSOLUTE: 0.4 K/UL (ref 0–1.3)
MONOCYTES RELATIVE PERCENT: 3 %
NEUTROPHILS ABSOLUTE: 11.6 K/UL (ref 1.7–7.7)
NEUTROPHILS RELATIVE PERCENT: 85.3 %
NITRITE, URINE: NEGATIVE
PDW BLD-RTO: 17.7 % (ref 12.4–15.4)
PH UA: 5.5 (ref 5–8)
PLATELET # BLD: 257 K/UL (ref 135–450)
PMV BLD AUTO: 7.8 FL (ref 5–10.5)
POTASSIUM REFLEX MAGNESIUM: 4.2 MMOL/L (ref 3.5–5.1)
PRO-BNP: 154 PG/ML (ref 0–124)
PROTEIN UA: NEGATIVE MG/DL
RBC # BLD: 4.67 M/UL (ref 4–5.2)
SODIUM BLD-SCNC: 142 MMOL/L (ref 136–145)
SPECIFIC GRAVITY UA: 1.01 (ref 1–1.03)
TOTAL PROTEIN: 7.9 G/DL (ref 6.4–8.2)
TROPONIN: <0.01 NG/ML
URINE REFLEX TO CULTURE: NORMAL
URINE TYPE: NORMAL
UROBILINOGEN, URINE: 0.2 E.U./DL
WBC # BLD: 13.6 K/UL (ref 4–11)

## 2019-11-07 PROCEDURE — 84484 ASSAY OF TROPONIN QUANT: CPT

## 2019-11-07 PROCEDURE — 71260 CT THORAX DX C+: CPT

## 2019-11-07 PROCEDURE — 99284 EMERGENCY DEPT VISIT MOD MDM: CPT

## 2019-11-07 PROCEDURE — 83880 ASSAY OF NATRIURETIC PEPTIDE: CPT

## 2019-11-07 PROCEDURE — 2580000003 HC RX 258: Performed by: PHYSICIAN ASSISTANT

## 2019-11-07 PROCEDURE — 96360 HYDRATION IV INFUSION INIT: CPT

## 2019-11-07 PROCEDURE — 81003 URINALYSIS AUTO W/O SCOPE: CPT

## 2019-11-07 PROCEDURE — 80053 COMPREHEN METABOLIC PANEL: CPT

## 2019-11-07 PROCEDURE — 6360000004 HC RX CONTRAST MEDICATION: Performed by: EMERGENCY MEDICINE

## 2019-11-07 PROCEDURE — 36415 COLL VENOUS BLD VENIPUNCTURE: CPT

## 2019-11-07 PROCEDURE — 83605 ASSAY OF LACTIC ACID: CPT

## 2019-11-07 PROCEDURE — 6370000000 HC RX 637 (ALT 250 FOR IP): Performed by: PHYSICIAN ASSISTANT

## 2019-11-07 PROCEDURE — 85025 COMPLETE CBC W/AUTO DIFF WBC: CPT

## 2019-11-07 PROCEDURE — 93005 ELECTROCARDIOGRAM TRACING: CPT | Performed by: PHYSICIAN ASSISTANT

## 2019-11-07 PROCEDURE — 6360000002 HC RX W HCPCS: Performed by: PHYSICIAN ASSISTANT

## 2019-11-07 PROCEDURE — 71045 X-RAY EXAM CHEST 1 VIEW: CPT

## 2019-11-07 PROCEDURE — 96361 HYDRATE IV INFUSION ADD-ON: CPT

## 2019-11-07 PROCEDURE — 87040 BLOOD CULTURE FOR BACTERIA: CPT

## 2019-11-07 RX ORDER — 0.9 % SODIUM CHLORIDE 0.9 %
1000 INTRAVENOUS SOLUTION INTRAVENOUS ONCE
Status: DISCONTINUED | OUTPATIENT
Start: 2019-11-07 | End: 2019-11-07

## 2019-11-07 RX ORDER — BENZONATATE 100 MG/1
200 CAPSULE ORAL ONCE
Status: COMPLETED | OUTPATIENT
Start: 2019-11-07 | End: 2019-11-07

## 2019-11-07 RX ORDER — 0.9 % SODIUM CHLORIDE 0.9 %
30 INTRAVENOUS SOLUTION INTRAVENOUS ONCE
Status: COMPLETED | OUTPATIENT
Start: 2019-11-07 | End: 2019-11-07

## 2019-11-07 RX ORDER — ALBUTEROL SULFATE 2.5 MG/3ML
2.5 SOLUTION RESPIRATORY (INHALATION) ONCE
Status: COMPLETED | OUTPATIENT
Start: 2019-11-07 | End: 2019-11-07

## 2019-11-07 RX ADMIN — BENZONATATE 200 MG: 100 CAPSULE ORAL at 18:19

## 2019-11-07 RX ADMIN — IOPAMIDOL 75 ML: 755 INJECTION, SOLUTION INTRAVENOUS at 18:02

## 2019-11-07 RX ADMIN — ALBUTEROL SULFATE 2.5 MG: 2.5 SOLUTION RESPIRATORY (INHALATION) at 16:31

## 2019-11-07 RX ADMIN — SODIUM CHLORIDE 1000 ML: 9 INJECTION, SOLUTION INTRAVENOUS at 18:19

## 2019-11-07 ASSESSMENT — PAIN DESCRIPTION - FREQUENCY: FREQUENCY: INTERMITTENT

## 2019-11-07 ASSESSMENT — PAIN DESCRIPTION - LOCATION: LOCATION: CHEST;BACK

## 2019-11-07 ASSESSMENT — PAIN SCALES - GENERAL: PAINLEVEL_OUTOF10: 7

## 2019-11-07 ASSESSMENT — PAIN DESCRIPTION - ORIENTATION: ORIENTATION: LEFT;UPPER

## 2019-11-07 ASSESSMENT — PAIN DESCRIPTION - ONSET: ONSET: SUDDEN

## 2019-11-07 ASSESSMENT — ENCOUNTER SYMPTOMS
COUGH: 1
ABDOMINAL PAIN: 0
SHORTNESS OF BREATH: 0
VOMITING: 0

## 2019-11-07 ASSESSMENT — PAIN - FUNCTIONAL ASSESSMENT: PAIN_FUNCTIONAL_ASSESSMENT: ACTIVITIES ARE NOT PREVENTED

## 2019-11-07 ASSESSMENT — PAIN DESCRIPTION - PROGRESSION: CLINICAL_PROGRESSION: NOT CHANGED

## 2019-11-07 ASSESSMENT — PAIN DESCRIPTION - PAIN TYPE: TYPE: ACUTE PAIN

## 2019-11-07 ASSESSMENT — PAIN DESCRIPTION - DESCRIPTORS: DESCRIPTORS: SHARP;PRESSURE;STABBING

## 2019-11-08 ENCOUNTER — CARE COORDINATION (OUTPATIENT)
Dept: CARE COORDINATION | Age: 64
End: 2019-11-08

## 2019-11-08 LAB
EKG ATRIAL RATE: 99 BPM
EKG DIAGNOSIS: NORMAL
EKG P AXIS: -3 DEGREES
EKG P-R INTERVAL: 146 MS
EKG Q-T INTERVAL: 342 MS
EKG QRS DURATION: 74 MS
EKG QTC CALCULATION (BAZETT): 438 MS
EKG R AXIS: 11 DEGREES
EKG T AXIS: 26 DEGREES
EKG VENTRICULAR RATE: 99 BPM

## 2019-11-08 PROCEDURE — 93010 ELECTROCARDIOGRAM REPORT: CPT | Performed by: INTERNAL MEDICINE

## 2019-11-12 LAB
BLOOD CULTURE, ROUTINE: NORMAL
CULTURE, BLOOD 2: NORMAL

## 2019-11-14 ENCOUNTER — OFFICE VISIT (OUTPATIENT)
Dept: PULMONOLOGY | Age: 64
End: 2019-11-14
Payer: COMMERCIAL

## 2019-11-14 VITALS
WEIGHT: 209 LBS | OXYGEN SATURATION: 97 % | BODY MASS INDEX: 37.03 KG/M2 | HEIGHT: 63 IN | TEMPERATURE: 97.5 F | SYSTOLIC BLOOD PRESSURE: 130 MMHG | DIASTOLIC BLOOD PRESSURE: 60 MMHG | RESPIRATION RATE: 16 BRPM | HEART RATE: 92 BPM

## 2019-11-14 DIAGNOSIS — Z23 NEED FOR INFLUENZA VACCINATION: ICD-10-CM

## 2019-11-14 DIAGNOSIS — J84.10 PULMONARY FIBROSIS (HCC): Primary | ICD-10-CM

## 2019-11-14 DIAGNOSIS — D84.9 IMMUNOSUPPRESSION (HCC): ICD-10-CM

## 2019-11-14 DIAGNOSIS — J84.116 CRYPTOGENIC ORGANIZING PNEUMONIA (HCC): ICD-10-CM

## 2019-11-14 PROCEDURE — G8400 PT W/DXA NO RESULTS DOC: HCPCS | Performed by: INTERNAL MEDICINE

## 2019-11-14 PROCEDURE — G8482 FLU IMMUNIZE ORDER/ADMIN: HCPCS | Performed by: INTERNAL MEDICINE

## 2019-11-14 PROCEDURE — 90653 IIV ADJUVANT VACCINE IM: CPT | Performed by: INTERNAL MEDICINE

## 2019-11-14 PROCEDURE — 90471 IMMUNIZATION ADMIN: CPT | Performed by: INTERNAL MEDICINE

## 2019-11-14 PROCEDURE — 1036F TOBACCO NON-USER: CPT | Performed by: INTERNAL MEDICINE

## 2019-11-14 PROCEDURE — 1090F PRES/ABSN URINE INCON ASSESS: CPT | Performed by: INTERNAL MEDICINE

## 2019-11-14 PROCEDURE — G8417 CALC BMI ABV UP PARAM F/U: HCPCS | Performed by: INTERNAL MEDICINE

## 2019-11-14 PROCEDURE — 99215 OFFICE O/P EST HI 40 MIN: CPT | Performed by: INTERNAL MEDICINE

## 2019-11-14 PROCEDURE — 3017F COLORECTAL CA SCREEN DOC REV: CPT | Performed by: INTERNAL MEDICINE

## 2019-11-14 PROCEDURE — 1123F ACP DISCUSS/DSCN MKR DOCD: CPT | Performed by: INTERNAL MEDICINE

## 2019-11-14 PROCEDURE — 4040F PNEUMOC VAC/ADMIN/RCVD: CPT | Performed by: INTERNAL MEDICINE

## 2019-11-14 PROCEDURE — G8427 DOCREV CUR MEDS BY ELIG CLIN: HCPCS | Performed by: INTERNAL MEDICINE

## 2019-11-14 RX ORDER — PREDNISONE 1 MG/1
15 TABLET ORAL DAILY
Qty: 90 TABLET | Refills: 1 | Status: SHIPPED | OUTPATIENT
Start: 2019-11-14 | End: 2019-12-12 | Stop reason: ALTCHOICE

## 2019-11-14 RX ORDER — BENZONATATE 200 MG/1
CAPSULE ORAL
Qty: 90 CAPSULE | Refills: 1 | Status: SHIPPED | OUTPATIENT
Start: 2019-11-14 | End: 2020-04-20

## 2019-11-15 ENCOUNTER — OFFICE VISIT (OUTPATIENT)
Dept: FAMILY MEDICINE CLINIC | Age: 64
End: 2019-11-15
Payer: COMMERCIAL

## 2019-11-15 VITALS
SYSTOLIC BLOOD PRESSURE: 130 MMHG | OXYGEN SATURATION: 96 % | TEMPERATURE: 96.4 F | WEIGHT: 205.6 LBS | HEART RATE: 119 BPM | BODY MASS INDEX: 36.42 KG/M2 | DIASTOLIC BLOOD PRESSURE: 74 MMHG

## 2019-11-15 DIAGNOSIS — J44.9 CHRONIC OBSTRUCTIVE PULMONARY DISEASE, UNSPECIFIED COPD TYPE (HCC): ICD-10-CM

## 2019-11-15 DIAGNOSIS — R05.9 COUGH: ICD-10-CM

## 2019-11-15 DIAGNOSIS — R79.89 ELEVATED LACTIC ACID LEVEL: ICD-10-CM

## 2019-11-15 DIAGNOSIS — E11.9 TYPE 2 DIABETES MELLITUS WITHOUT COMPLICATION, WITHOUT LONG-TERM CURRENT USE OF INSULIN (HCC): Primary | ICD-10-CM

## 2019-11-15 DIAGNOSIS — Z12.39 BREAST CANCER SCREENING: ICD-10-CM

## 2019-11-15 PROCEDURE — G8926 SPIRO NO PERF OR DOC: HCPCS | Performed by: FAMILY MEDICINE

## 2019-11-15 PROCEDURE — 1090F PRES/ABSN URINE INCON ASSESS: CPT | Performed by: FAMILY MEDICINE

## 2019-11-15 PROCEDURE — G8482 FLU IMMUNIZE ORDER/ADMIN: HCPCS | Performed by: FAMILY MEDICINE

## 2019-11-15 PROCEDURE — 4040F PNEUMOC VAC/ADMIN/RCVD: CPT | Performed by: FAMILY MEDICINE

## 2019-11-15 PROCEDURE — 2022F DILAT RTA XM EVC RTNOPTHY: CPT | Performed by: FAMILY MEDICINE

## 2019-11-15 PROCEDURE — 3023F SPIROM DOC REV: CPT | Performed by: FAMILY MEDICINE

## 2019-11-15 PROCEDURE — G8428 CUR MEDS NOT DOCUMENT: HCPCS | Performed by: FAMILY MEDICINE

## 2019-11-15 PROCEDURE — 1036F TOBACCO NON-USER: CPT | Performed by: FAMILY MEDICINE

## 2019-11-15 PROCEDURE — 1123F ACP DISCUSS/DSCN MKR DOCD: CPT | Performed by: FAMILY MEDICINE

## 2019-11-15 PROCEDURE — 99214 OFFICE O/P EST MOD 30 MIN: CPT | Performed by: FAMILY MEDICINE

## 2019-11-15 PROCEDURE — 3017F COLORECTAL CA SCREEN DOC REV: CPT | Performed by: FAMILY MEDICINE

## 2019-11-15 PROCEDURE — G8400 PT W/DXA NO RESULTS DOC: HCPCS | Performed by: FAMILY MEDICINE

## 2019-11-15 PROCEDURE — G8417 CALC BMI ABV UP PARAM F/U: HCPCS | Performed by: FAMILY MEDICINE

## 2019-11-15 RX ORDER — DEXTROMETHORPHAN HYDROBROMIDE AND PROMETHAZINE HYDROCHLORIDE 15; 6.25 MG/5ML; MG/5ML
5 SYRUP ORAL 4 TIMES DAILY PRN
Qty: 240 ML | Refills: 1 | Status: SHIPPED | OUTPATIENT
Start: 2019-11-15 | End: 2020-05-22 | Stop reason: ALTCHOICE

## 2019-11-15 ASSESSMENT — PATIENT HEALTH QUESTIONNAIRE - PHQ9
2. FEELING DOWN, DEPRESSED OR HOPELESS: 0
1. LITTLE INTEREST OR PLEASURE IN DOING THINGS: 0
SUM OF ALL RESPONSES TO PHQ9 QUESTIONS 1 & 2: 0
SUM OF ALL RESPONSES TO PHQ QUESTIONS 1-9: 0
SUM OF ALL RESPONSES TO PHQ QUESTIONS 1-9: 0

## 2019-12-05 ENCOUNTER — TELEPHONE (OUTPATIENT)
Dept: PULMONOLOGY | Age: 64
End: 2019-12-05

## 2019-12-05 DIAGNOSIS — F41.9 ANXIETY: ICD-10-CM

## 2019-12-05 RX ORDER — MIRTAZAPINE 30 MG/1
TABLET, FILM COATED ORAL
Qty: 30 TABLET | Refills: 2 | Status: SHIPPED | OUTPATIENT
Start: 2019-12-05 | End: 2020-03-30 | Stop reason: SDUPTHER

## 2019-12-11 RX ORDER — SULFAMETHOXAZOLE AND TRIMETHOPRIM 800; 160 MG/1; MG/1
TABLET ORAL
Qty: 12 TABLET | Refills: 1 | OUTPATIENT
Start: 2019-12-11

## 2019-12-12 ENCOUNTER — OFFICE VISIT (OUTPATIENT)
Dept: PULMONOLOGY | Age: 64
End: 2019-12-12
Payer: COMMERCIAL

## 2019-12-12 ENCOUNTER — CARE COORDINATION (OUTPATIENT)
Dept: CARE COORDINATION | Age: 64
End: 2019-12-12

## 2019-12-12 VITALS
OXYGEN SATURATION: 98 % | HEART RATE: 82 BPM | TEMPERATURE: 97.5 F | WEIGHT: 209 LBS | BODY MASS INDEX: 35.68 KG/M2 | RESPIRATION RATE: 18 BRPM | HEIGHT: 64 IN | DIASTOLIC BLOOD PRESSURE: 76 MMHG | SYSTOLIC BLOOD PRESSURE: 128 MMHG

## 2019-12-12 DIAGNOSIS — F41.9 ANXIETY: ICD-10-CM

## 2019-12-12 DIAGNOSIS — J98.4 RESTRICTIVE LUNG DISEASE: ICD-10-CM

## 2019-12-12 DIAGNOSIS — J84.9 ILD (INTERSTITIAL LUNG DISEASE) (HCC): ICD-10-CM

## 2019-12-12 DIAGNOSIS — D84.9 IMMUNOSUPPRESSION (HCC): Primary | ICD-10-CM

## 2019-12-12 DIAGNOSIS — J84.116 CRYPTOGENIC ORGANIZING PNEUMONIA (HCC): ICD-10-CM

## 2019-12-12 PROCEDURE — 1090F PRES/ABSN URINE INCON ASSESS: CPT | Performed by: INTERNAL MEDICINE

## 2019-12-12 PROCEDURE — 3017F COLORECTAL CA SCREEN DOC REV: CPT | Performed by: INTERNAL MEDICINE

## 2019-12-12 PROCEDURE — 1036F TOBACCO NON-USER: CPT | Performed by: INTERNAL MEDICINE

## 2019-12-12 PROCEDURE — G8400 PT W/DXA NO RESULTS DOC: HCPCS | Performed by: INTERNAL MEDICINE

## 2019-12-12 PROCEDURE — 1123F ACP DISCUSS/DSCN MKR DOCD: CPT | Performed by: INTERNAL MEDICINE

## 2019-12-12 PROCEDURE — 99215 OFFICE O/P EST HI 40 MIN: CPT | Performed by: INTERNAL MEDICINE

## 2019-12-12 PROCEDURE — 4040F PNEUMOC VAC/ADMIN/RCVD: CPT | Performed by: INTERNAL MEDICINE

## 2019-12-12 PROCEDURE — G8427 DOCREV CUR MEDS BY ELIG CLIN: HCPCS | Performed by: INTERNAL MEDICINE

## 2019-12-12 PROCEDURE — G8482 FLU IMMUNIZE ORDER/ADMIN: HCPCS | Performed by: INTERNAL MEDICINE

## 2019-12-12 PROCEDURE — G8417 CALC BMI ABV UP PARAM F/U: HCPCS | Performed by: INTERNAL MEDICINE

## 2019-12-12 RX ORDER — LORAZEPAM 0.5 MG/1
0.5 TABLET ORAL EVERY 6 HOURS PRN
Qty: 30 TABLET | Refills: 5 | Status: SHIPPED | OUTPATIENT
Start: 2019-12-12 | End: 2020-05-22 | Stop reason: SDUPTHER

## 2019-12-12 RX ORDER — PREDNISONE 1 MG/1
5 TABLET ORAL DAILY
COMMUNITY
End: 2020-01-08

## 2019-12-13 ENCOUNTER — HOSPITAL ENCOUNTER (OUTPATIENT)
Dept: MAMMOGRAPHY | Age: 64
Discharge: HOME | End: 2019-12-13
Payer: COMMERCIAL

## 2019-12-13 DIAGNOSIS — R92.8 ABNORMAL MAMMOGRAM: Primary | ICD-10-CM

## 2019-12-13 DIAGNOSIS — Z12.39 BREAST CANCER SCREENING: ICD-10-CM

## 2019-12-13 PROCEDURE — 77063 BREAST TOMOSYNTHESIS BI: CPT

## 2019-12-23 ENCOUNTER — APPOINTMENT (OUTPATIENT)
Dept: GENERAL RADIOLOGY | Age: 64
DRG: 193 | End: 2019-12-23
Payer: COMMERCIAL

## 2019-12-23 ENCOUNTER — HOSPITAL ENCOUNTER (INPATIENT)
Age: 64
LOS: 3 days | Discharge: HOME HEALTH | DRG: 193 | End: 2019-12-26
Attending: EMERGENCY MEDICINE | Admitting: INTERNAL MEDICINE
Payer: COMMERCIAL

## 2019-12-23 DIAGNOSIS — J96.21 ACUTE ON CHRONIC RESPIRATORY FAILURE WITH HYPOXIA (HCC): Primary | ICD-10-CM

## 2019-12-23 PROBLEM — E87.1 HYPONATREMIA: Status: ACTIVE | Noted: 2019-12-23

## 2019-12-23 LAB
A/G RATIO: 1.1 (ref 1.1–2.2)
ALBUMIN SERPL-MCNC: 3.5 G/DL (ref 3.4–5)
ALP BLD-CCNC: 90 U/L (ref 40–129)
ALT SERPL-CCNC: 24 U/L (ref 10–40)
ANION GAP SERPL CALCULATED.3IONS-SCNC: 15 MMOL/L (ref 3–16)
AST SERPL-CCNC: 50 U/L (ref 15–37)
BASOPHILS ABSOLUTE: 0.1 K/UL (ref 0–0.2)
BASOPHILS RELATIVE PERCENT: 0.7 %
BILIRUB SERPL-MCNC: 0.4 MG/DL (ref 0–1)
BUN BLDV-MCNC: 12 MG/DL (ref 7–20)
CALCIUM SERPL-MCNC: 9 MG/DL (ref 8.3–10.6)
CHLORIDE BLD-SCNC: 95 MMOL/L (ref 99–110)
CO2: 25 MMOL/L (ref 21–32)
CREAT SERPL-MCNC: 0.8 MG/DL (ref 0.6–1.2)
EOSINOPHILS ABSOLUTE: 0.3 K/UL (ref 0–0.6)
EOSINOPHILS RELATIVE PERCENT: 3.3 %
GFR AFRICAN AMERICAN: >60
GFR NON-AFRICAN AMERICAN: >60
GLOBULIN: 3.3 G/DL
GLUCOSE BLD-MCNC: 136 MG/DL (ref 70–99)
GLUCOSE BLD-MCNC: 142 MG/DL (ref 70–99)
GLUCOSE BLD-MCNC: 149 MG/DL (ref 70–99)
HCT VFR BLD CALC: 31.2 % (ref 36–48)
HEMOGLOBIN: 9.8 G/DL (ref 12–16)
LYMPHOCYTES ABSOLUTE: 1.6 K/UL (ref 1–5.1)
LYMPHOCYTES RELATIVE PERCENT: 16.8 %
MAGNESIUM: 1.8 MG/DL (ref 1.8–2.4)
MCH RBC QN AUTO: 23.1 PG (ref 26–34)
MCHC RBC AUTO-ENTMCNC: 31.5 G/DL (ref 31–36)
MCV RBC AUTO: 73.4 FL (ref 80–100)
MONOCYTES ABSOLUTE: 0.9 K/UL (ref 0–1.3)
MONOCYTES RELATIVE PERCENT: 10 %
NEUTROPHILS ABSOLUTE: 6.6 K/UL (ref 1.7–7.7)
NEUTROPHILS RELATIVE PERCENT: 69.2 %
PDW BLD-RTO: 17.3 % (ref 12.4–15.4)
PERFORMED ON: ABNORMAL
PERFORMED ON: ABNORMAL
PLATELET # BLD: 168 K/UL (ref 135–450)
PMV BLD AUTO: 7.4 FL (ref 5–10.5)
POTASSIUM REFLEX MAGNESIUM: 3.3 MMOL/L (ref 3.5–5.1)
RAPID INFLUENZA  B AGN: NEGATIVE
RAPID INFLUENZA A AGN: NEGATIVE
RBC # BLD: 4.25 M/UL (ref 4–5.2)
SODIUM BLD-SCNC: 135 MMOL/L (ref 136–145)
TOTAL PROTEIN: 6.8 G/DL (ref 6.4–8.2)
WBC # BLD: 9.5 K/UL (ref 4–11)

## 2019-12-23 PROCEDURE — 6370000000 HC RX 637 (ALT 250 FOR IP): Performed by: NURSE PRACTITIONER

## 2019-12-23 PROCEDURE — 2580000003 HC RX 258: Performed by: EMERGENCY MEDICINE

## 2019-12-23 PROCEDURE — 94669 MECHANICAL CHEST WALL OSCILL: CPT

## 2019-12-23 PROCEDURE — 94761 N-INVAS EAR/PLS OXIMETRY MLT: CPT

## 2019-12-23 PROCEDURE — 2580000003 HC RX 258: Performed by: NURSE PRACTITIONER

## 2019-12-23 PROCEDURE — 87804 INFLUENZA ASSAY W/OPTIC: CPT

## 2019-12-23 PROCEDURE — 94150 VITAL CAPACITY TEST: CPT

## 2019-12-23 PROCEDURE — 83735 ASSAY OF MAGNESIUM: CPT

## 2019-12-23 PROCEDURE — 71046 X-RAY EXAM CHEST 2 VIEWS: CPT

## 2019-12-23 PROCEDURE — 6370000000 HC RX 637 (ALT 250 FOR IP): Performed by: EMERGENCY MEDICINE

## 2019-12-23 PROCEDURE — 87040 BLOOD CULTURE FOR BACTERIA: CPT

## 2019-12-23 PROCEDURE — 99223 1ST HOSP IP/OBS HIGH 75: CPT | Performed by: PHYSICIAN ASSISTANT

## 2019-12-23 PROCEDURE — 2700000000 HC OXYGEN THERAPY PER DAY

## 2019-12-23 PROCEDURE — 94640 AIRWAY INHALATION TREATMENT: CPT

## 2019-12-23 PROCEDURE — 36415 COLL VENOUS BLD VENIPUNCTURE: CPT

## 2019-12-23 PROCEDURE — 1200000000 HC SEMI PRIVATE

## 2019-12-23 PROCEDURE — 85025 COMPLETE CBC W/AUTO DIFF WBC: CPT

## 2019-12-23 PROCEDURE — 99223 1ST HOSP IP/OBS HIGH 75: CPT | Performed by: INTERNAL MEDICINE

## 2019-12-23 PROCEDURE — 99284 EMERGENCY DEPT VISIT MOD MDM: CPT

## 2019-12-23 PROCEDURE — 6370000000 HC RX 637 (ALT 250 FOR IP): Performed by: INTERNAL MEDICINE

## 2019-12-23 PROCEDURE — 6360000002 HC RX W HCPCS: Performed by: NURSE PRACTITIONER

## 2019-12-23 PROCEDURE — 80053 COMPREHEN METABOLIC PANEL: CPT

## 2019-12-23 PROCEDURE — 6370000000 HC RX 637 (ALT 250 FOR IP): Performed by: PHYSICIAN ASSISTANT

## 2019-12-23 RX ORDER — POTASSIUM CHLORIDE 750 MG/1
40 TABLET, EXTENDED RELEASE ORAL ONCE
Status: COMPLETED | OUTPATIENT
Start: 2019-12-23 | End: 2019-12-23

## 2019-12-23 RX ORDER — SODIUM CHLORIDE 0.9 % (FLUSH) 0.9 %
10 SYRINGE (ML) INJECTION EVERY 12 HOURS SCHEDULED
Status: DISCONTINUED | OUTPATIENT
Start: 2019-12-23 | End: 2019-12-26 | Stop reason: HOSPADM

## 2019-12-23 RX ORDER — DEXTROSE MONOHYDRATE 25 G/50ML
12.5 INJECTION, SOLUTION INTRAVENOUS PRN
Status: DISCONTINUED | OUTPATIENT
Start: 2019-12-23 | End: 2019-12-26 | Stop reason: HOSPADM

## 2019-12-23 RX ORDER — POTASSIUM CHLORIDE 20MEQ/15ML
40 LIQUID (ML) ORAL ONCE
Status: DISCONTINUED | OUTPATIENT
Start: 2019-12-23 | End: 2019-12-23

## 2019-12-23 RX ORDER — POLYETHYLENE GLYCOL 3350 17 G/17G
17 POWDER, FOR SOLUTION ORAL DAILY PRN
Status: DISCONTINUED | OUTPATIENT
Start: 2019-12-23 | End: 2019-12-26 | Stop reason: HOSPADM

## 2019-12-23 RX ORDER — PREDNISONE 1 MG/1
5 TABLET ORAL 3 TIMES DAILY
Status: DISCONTINUED | OUTPATIENT
Start: 2019-12-23 | End: 2019-12-23

## 2019-12-23 RX ORDER — MIRTAZAPINE 30 MG/1
30 TABLET, FILM COATED ORAL NIGHTLY
Status: DISCONTINUED | OUTPATIENT
Start: 2019-12-23 | End: 2019-12-26 | Stop reason: HOSPADM

## 2019-12-23 RX ORDER — LEVOTHYROXINE SODIUM 0.15 MG/1
150 TABLET ORAL DAILY
Status: DISCONTINUED | OUTPATIENT
Start: 2019-12-23 | End: 2019-12-26 | Stop reason: HOSPADM

## 2019-12-23 RX ORDER — 0.9 % SODIUM CHLORIDE 0.9 %
500 INTRAVENOUS SOLUTION INTRAVENOUS ONCE
Status: COMPLETED | OUTPATIENT
Start: 2019-12-23 | End: 2019-12-23

## 2019-12-23 RX ORDER — SODIUM CHLORIDE 0.9 % (FLUSH) 0.9 %
10 SYRINGE (ML) INJECTION PRN
Status: DISCONTINUED | OUTPATIENT
Start: 2019-12-23 | End: 2019-12-26 | Stop reason: HOSPADM

## 2019-12-23 RX ORDER — AZELASTINE 1 MG/ML
1 SPRAY, METERED NASAL 2 TIMES DAILY
Status: DISCONTINUED | OUTPATIENT
Start: 2019-12-23 | End: 2019-12-26 | Stop reason: HOSPADM

## 2019-12-23 RX ORDER — IPRATROPIUM BROMIDE AND ALBUTEROL SULFATE 2.5; .5 MG/3ML; MG/3ML
3 SOLUTION RESPIRATORY (INHALATION) EVERY 4 HOURS
Status: DISCONTINUED | OUTPATIENT
Start: 2019-12-23 | End: 2019-12-24

## 2019-12-23 RX ORDER — NADOLOL 40 MG/1
20 TABLET ORAL DAILY
Status: DISCONTINUED | OUTPATIENT
Start: 2019-12-23 | End: 2019-12-26 | Stop reason: HOSPADM

## 2019-12-23 RX ORDER — POTASSIUM CHLORIDE 20 MEQ/1
20 TABLET, EXTENDED RELEASE ORAL DAILY
Status: DISCONTINUED | OUTPATIENT
Start: 2019-12-23 | End: 2019-12-26 | Stop reason: HOSPADM

## 2019-12-23 RX ORDER — ALBUTEROL SULFATE 2.5 MG/3ML
2.5 SOLUTION RESPIRATORY (INHALATION) EVERY 4 HOURS PRN
Status: DISCONTINUED | OUTPATIENT
Start: 2019-12-23 | End: 2019-12-26 | Stop reason: HOSPADM

## 2019-12-23 RX ORDER — ESOMEPRAZOLE MAGNESIUM 20 MG/1
1 TABLET, DELAYED RELEASE ORAL 2 TIMES DAILY
Status: DISCONTINUED | OUTPATIENT
Start: 2019-12-23 | End: 2019-12-23

## 2019-12-23 RX ORDER — TRAZODONE HYDROCHLORIDE 50 MG/1
50 TABLET ORAL NIGHTLY PRN
Status: DISCONTINUED | OUTPATIENT
Start: 2019-12-23 | End: 2019-12-26 | Stop reason: HOSPADM

## 2019-12-23 RX ORDER — AZELASTINE HYDROCHLORIDE, FLUTICASONE PROPIONATE 137; 50 UG/1; UG/1
1 SPRAY, METERED NASAL DAILY
Status: DISCONTINUED | OUTPATIENT
Start: 2019-12-23 | End: 2019-12-23

## 2019-12-23 RX ORDER — LORAZEPAM 0.5 MG/1
0.5 TABLET ORAL EVERY 6 HOURS PRN
Status: DISCONTINUED | OUTPATIENT
Start: 2019-12-23 | End: 2019-12-26 | Stop reason: HOSPADM

## 2019-12-23 RX ORDER — ACETAMINOPHEN 325 MG/1
650 TABLET ORAL EVERY 4 HOURS PRN
Status: DISCONTINUED | OUTPATIENT
Start: 2019-12-23 | End: 2019-12-26 | Stop reason: HOSPADM

## 2019-12-23 RX ORDER — BENZONATATE 100 MG/1
100 CAPSULE ORAL 3 TIMES DAILY PRN
Status: DISCONTINUED | OUTPATIENT
Start: 2019-12-23 | End: 2019-12-26 | Stop reason: HOSPADM

## 2019-12-23 RX ORDER — AMITRIPTYLINE HYDROCHLORIDE 25 MG/1
25 TABLET, FILM COATED ORAL NIGHTLY
Status: DISCONTINUED | OUTPATIENT
Start: 2019-12-23 | End: 2019-12-26 | Stop reason: HOSPADM

## 2019-12-23 RX ORDER — NICOTINE POLACRILEX 4 MG
15 LOZENGE BUCCAL PRN
Status: DISCONTINUED | OUTPATIENT
Start: 2019-12-23 | End: 2019-12-26 | Stop reason: HOSPADM

## 2019-12-23 RX ORDER — PANTOPRAZOLE SODIUM 40 MG/1
40 TABLET, DELAYED RELEASE ORAL
Status: DISCONTINUED | OUTPATIENT
Start: 2019-12-23 | End: 2019-12-26 | Stop reason: HOSPADM

## 2019-12-23 RX ORDER — DEXTROMETHORPHAN HYDROBROMIDE AND PROMETHAZINE HYDROCHLORIDE 15; 6.25 MG/5ML; MG/5ML
5 SOLUTION ORAL 4 TIMES DAILY PRN
Status: DISCONTINUED | OUTPATIENT
Start: 2019-12-23 | End: 2019-12-26 | Stop reason: HOSPADM

## 2019-12-23 RX ORDER — PREDNISONE 1 MG/1
5 TABLET ORAL DAILY
Status: DISCONTINUED | OUTPATIENT
Start: 2019-12-24 | End: 2019-12-26 | Stop reason: HOSPADM

## 2019-12-23 RX ORDER — HYDROXYZINE HYDROCHLORIDE 10 MG/1
10 TABLET, FILM COATED ORAL 3 TIMES DAILY PRN
Status: DISCONTINUED | OUTPATIENT
Start: 2019-12-23 | End: 2019-12-26 | Stop reason: HOSPADM

## 2019-12-23 RX ORDER — FUROSEMIDE 40 MG/1
40 TABLET ORAL DAILY
Status: DISCONTINUED | OUTPATIENT
Start: 2019-12-23 | End: 2019-12-26 | Stop reason: HOSPADM

## 2019-12-23 RX ORDER — DEXTROSE MONOHYDRATE 50 MG/ML
100 INJECTION, SOLUTION INTRAVENOUS PRN
Status: DISCONTINUED | OUTPATIENT
Start: 2019-12-23 | End: 2019-12-26 | Stop reason: HOSPADM

## 2019-12-23 RX ORDER — ALBUTEROL SULFATE 2.5 MG/3ML
2.5 SOLUTION RESPIRATORY (INHALATION) EVERY 6 HOURS PRN
Status: DISCONTINUED | OUTPATIENT
Start: 2019-12-23 | End: 2019-12-23

## 2019-12-23 RX ORDER — IPRATROPIUM BROMIDE AND ALBUTEROL SULFATE 2.5; .5 MG/3ML; MG/3ML
3 SOLUTION RESPIRATORY (INHALATION) EVERY 6 HOURS PRN
Status: DISCONTINUED | OUTPATIENT
Start: 2019-12-23 | End: 2019-12-23

## 2019-12-23 RX ORDER — ACETAMINOPHEN 325 MG/1
TABLET ORAL
Status: COMPLETED
Start: 2019-12-23 | End: 2019-12-23

## 2019-12-23 RX ORDER — SODIUM CHLORIDE 9 MG/ML
INJECTION, SOLUTION INTRAVENOUS
Status: DISCONTINUED
Start: 2019-12-23 | End: 2019-12-23

## 2019-12-23 RX ORDER — ONDANSETRON 2 MG/ML
4 INJECTION INTRAMUSCULAR; INTRAVENOUS EVERY 6 HOURS PRN
Status: DISCONTINUED | OUTPATIENT
Start: 2019-12-23 | End: 2019-12-26 | Stop reason: HOSPADM

## 2019-12-23 RX ORDER — FLUTICASONE PROPIONATE 50 MCG
1 SPRAY, SUSPENSION (ML) NASAL DAILY
Status: DISCONTINUED | OUTPATIENT
Start: 2019-12-23 | End: 2019-12-26 | Stop reason: HOSPADM

## 2019-12-23 RX ORDER — SERTRALINE HYDROCHLORIDE 100 MG/1
200 TABLET, FILM COATED ORAL DAILY
Status: DISCONTINUED | OUTPATIENT
Start: 2019-12-23 | End: 2019-12-26 | Stop reason: HOSPADM

## 2019-12-23 RX ADMIN — ENOXAPARIN SODIUM 40 MG: 40 INJECTION SUBCUTANEOUS at 16:10

## 2019-12-23 RX ADMIN — IPRATROPIUM BROMIDE AND ALBUTEROL SULFATE 3 ML: .5; 3 SOLUTION RESPIRATORY (INHALATION) at 23:14

## 2019-12-23 RX ADMIN — FLUTICASONE PROPIONATE 1 SPRAY: 50 SPRAY, METERED NASAL at 16:15

## 2019-12-23 RX ADMIN — IPRATROPIUM BROMIDE AND ALBUTEROL SULFATE 3 ML: .5; 3 SOLUTION RESPIRATORY (INHALATION) at 16:39

## 2019-12-23 RX ADMIN — IPRATROPIUM BROMIDE AND ALBUTEROL SULFATE 3 ML: .5; 3 SOLUTION RESPIRATORY (INHALATION) at 19:03

## 2019-12-23 RX ADMIN — FUROSEMIDE 40 MG: 40 TABLET ORAL at 16:11

## 2019-12-23 RX ADMIN — INSULIN LISPRO 1 UNITS: 100 INJECTION, SOLUTION INTRAVENOUS; SUBCUTANEOUS at 21:03

## 2019-12-23 RX ADMIN — PANTOPRAZOLE SODIUM 40 MG: 40 TABLET, DELAYED RELEASE ORAL at 16:11

## 2019-12-23 RX ADMIN — LEVOTHYROXINE SODIUM 150 MCG: 150 TABLET ORAL at 16:11

## 2019-12-23 RX ADMIN — MIRTAZAPINE 30 MG: 30 TABLET, FILM COATED ORAL at 21:01

## 2019-12-23 RX ADMIN — POTASSIUM CHLORIDE 40 MEQ: 750 TABLET, EXTENDED RELEASE ORAL at 13:45

## 2019-12-23 RX ADMIN — AZELASTINE HYDROCHLORIDE 1 SPRAY: 137 SPRAY, METERED NASAL at 21:00

## 2019-12-23 RX ADMIN — DEXTROMETHORPHAN HYDROBROMIDE AND PROMETHAZINE HYDROCHLORIDE 5 ML: 15; 6.25 SOLUTION ORAL at 22:25

## 2019-12-23 RX ADMIN — AMITRIPTYLINE HYDROCHLORIDE 25 MG: 25 TABLET, FILM COATED ORAL at 21:00

## 2019-12-23 RX ADMIN — Medication 10 ML: at 21:00

## 2019-12-23 RX ADMIN — SODIUM CHLORIDE 500 ML: 9 INJECTION, SOLUTION INTRAVENOUS at 05:00

## 2019-12-23 RX ADMIN — ACETAMINOPHEN 650 MG: 325 TABLET ORAL at 18:08

## 2019-12-23 RX ADMIN — SERTRALINE 200 MG: 100 TABLET, FILM COATED ORAL at 16:11

## 2019-12-23 ASSESSMENT — PAIN SCALES - GENERAL
PAINLEVEL_OUTOF10: 0
PAINLEVEL_OUTOF10: 5
PAINLEVEL_OUTOF10: 4

## 2019-12-23 ASSESSMENT — PAIN DESCRIPTION - LOCATION: LOCATION: GENERALIZED

## 2019-12-23 ASSESSMENT — PAIN DESCRIPTION - ONSET: ONSET: SUDDEN

## 2019-12-23 ASSESSMENT — PAIN DESCRIPTION - DESCRIPTORS: DESCRIPTORS: ACHING

## 2019-12-23 ASSESSMENT — PAIN DESCRIPTION - FREQUENCY: FREQUENCY: CONTINUOUS

## 2019-12-24 LAB
A/G RATIO: 1 (ref 1.1–2.2)
ALBUMIN SERPL-MCNC: 3.5 G/DL (ref 3.4–5)
ALP BLD-CCNC: 89 U/L (ref 40–129)
ALT SERPL-CCNC: 21 U/L (ref 10–40)
ANION GAP SERPL CALCULATED.3IONS-SCNC: 13 MMOL/L (ref 3–16)
ANION GAP SERPL CALCULATED.3IONS-SCNC: 16 MMOL/L (ref 3–16)
APPEARANCE BAL (LAVAGE): ABNORMAL
AST SERPL-CCNC: 50 U/L (ref 15–37)
BILIRUB SERPL-MCNC: 0.5 MG/DL (ref 0–1)
BUN BLDV-MCNC: 8 MG/DL (ref 7–20)
BUN BLDV-MCNC: 8 MG/DL (ref 7–20)
CALCIUM SERPL-MCNC: 8.7 MG/DL (ref 8.3–10.6)
CALCIUM SERPL-MCNC: 8.8 MG/DL (ref 8.3–10.6)
CHLORIDE BLD-SCNC: 96 MMOL/L (ref 99–110)
CHLORIDE BLD-SCNC: 99 MMOL/L (ref 99–110)
CLOT EVALUATION BAL: ABNORMAL
CO2: 23 MMOL/L (ref 21–32)
CO2: 24 MMOL/L (ref 21–32)
COLOR LAVAGE: ABNORMAL
CREAT SERPL-MCNC: 0.7 MG/DL (ref 0.6–1.2)
CREAT SERPL-MCNC: 0.8 MG/DL (ref 0.6–1.2)
EOSIN: 7 %
GFR AFRICAN AMERICAN: >60
GFR AFRICAN AMERICAN: >60
GFR NON-AFRICAN AMERICAN: >60
GFR NON-AFRICAN AMERICAN: >60
GLOBULIN: 3.5 G/DL
GLUCOSE BLD-MCNC: 116 MG/DL (ref 70–99)
GLUCOSE BLD-MCNC: 118 MG/DL (ref 70–99)
GLUCOSE BLD-MCNC: 128 MG/DL (ref 70–99)
GLUCOSE BLD-MCNC: 136 MG/DL (ref 70–99)
GLUCOSE BLD-MCNC: 157 MG/DL (ref 70–99)
GLUCOSE BLD-MCNC: 220 MG/DL (ref 70–99)
HCT VFR BLD CALC: 31.6 % (ref 36–48)
HEMOGLOBIN: 9.9 G/DL (ref 12–16)
LYMPHOCYTES, BAL: 3 % (ref 5–10)
MACROPHAGES, BAL: 28 % (ref 90–95)
MAGNESIUM: 2 MG/DL (ref 1.8–2.4)
MCH RBC QN AUTO: 23.3 PG (ref 26–34)
MCHC RBC AUTO-ENTMCNC: 31.2 G/DL (ref 31–36)
MCV RBC AUTO: 74.6 FL (ref 80–100)
MONOCYTES, BAL: 15 %
NUMBER OF CELLS COUNTED BAL (LAVAGE): 100
PDW BLD-RTO: 17.7 % (ref 12.4–15.4)
PERFORMED ON: ABNORMAL
PLATELET # BLD: 160 K/UL (ref 135–450)
PMV BLD AUTO: 7.4 FL (ref 5–10.5)
POTASSIUM REFLEX MAGNESIUM: 3.3 MMOL/L (ref 3.5–5.1)
POTASSIUM SERPL-SCNC: 3.6 MMOL/L (ref 3.5–5.1)
RBC # BLD: 4.24 M/UL (ref 4–5.2)
RBC, BAL: 5600 /CUMM
SEGMENTED NEUTROPHILS, BAL: 47 % (ref 5–10)
SODIUM BLD-SCNC: 133 MMOL/L (ref 136–145)
SODIUM BLD-SCNC: 138 MMOL/L (ref 136–145)
TOTAL PROTEIN: 7 G/DL (ref 6.4–8.2)
TROPONIN: <0.01 NG/ML
WBC # BLD: 6.4 K/UL (ref 4–11)
WBC/EPI CELLS BAL: 2431 /CUMM

## 2019-12-24 PROCEDURE — 87070 CULTURE OTHR SPECIMN AEROBIC: CPT

## 2019-12-24 PROCEDURE — 36415 COLL VENOUS BLD VENIPUNCTURE: CPT

## 2019-12-24 PROCEDURE — 6360000002 HC RX W HCPCS: Performed by: INTERNAL MEDICINE

## 2019-12-24 PROCEDURE — 2709999900 HC NON-CHARGEABLE SUPPLY: Performed by: INTERNAL MEDICINE

## 2019-12-24 PROCEDURE — 80053 COMPREHEN METABOLIC PANEL: CPT

## 2019-12-24 PROCEDURE — 94640 AIRWAY INHALATION TREATMENT: CPT

## 2019-12-24 PROCEDURE — 89051 BODY FLUID CELL COUNT: CPT

## 2019-12-24 PROCEDURE — 6370000000 HC RX 637 (ALT 250 FOR IP): Performed by: PHYSICIAN ASSISTANT

## 2019-12-24 PROCEDURE — 6370000000 HC RX 637 (ALT 250 FOR IP): Performed by: INTERNAL MEDICINE

## 2019-12-24 PROCEDURE — 87206 SMEAR FLUORESCENT/ACID STAI: CPT

## 2019-12-24 PROCEDURE — 84484 ASSAY OF TROPONIN QUANT: CPT

## 2019-12-24 PROCEDURE — 7100000010 HC PHASE II RECOVERY - FIRST 15 MIN: Performed by: INTERNAL MEDICINE

## 2019-12-24 PROCEDURE — G0500 MOD SEDAT ENDO SERVICE >5YRS: HCPCS | Performed by: INTERNAL MEDICINE

## 2019-12-24 PROCEDURE — 87205 SMEAR GRAM STAIN: CPT

## 2019-12-24 PROCEDURE — 2580000003 HC RX 258: Performed by: NURSE PRACTITIONER

## 2019-12-24 PROCEDURE — 93005 ELECTROCARDIOGRAM TRACING: CPT | Performed by: INTERNAL MEDICINE

## 2019-12-24 PROCEDURE — 94669 MECHANICAL CHEST WALL OSCILL: CPT

## 2019-12-24 PROCEDURE — 88112 CYTOPATH CELL ENHANCE TECH: CPT

## 2019-12-24 PROCEDURE — 87116 MYCOBACTERIA CULTURE: CPT

## 2019-12-24 PROCEDURE — 6370000000 HC RX 637 (ALT 250 FOR IP): Performed by: NURSE PRACTITIONER

## 2019-12-24 PROCEDURE — 85027 COMPLETE CBC AUTOMATED: CPT

## 2019-12-24 PROCEDURE — 31624 DX BRONCHOSCOPE/LAVAGE: CPT | Performed by: INTERNAL MEDICINE

## 2019-12-24 PROCEDURE — 99152 MOD SED SAME PHYS/QHP 5/>YRS: CPT | Performed by: INTERNAL MEDICINE

## 2019-12-24 PROCEDURE — 0B9J8ZX DRAINAGE OF LEFT LOWER LUNG LOBE, VIA NATURAL OR ARTIFICIAL OPENING ENDOSCOPIC, DIAGNOSTIC: ICD-10-PCS | Performed by: INTERNAL MEDICINE

## 2019-12-24 PROCEDURE — 96360 HYDRATION IV INFUSION INIT: CPT

## 2019-12-24 PROCEDURE — 6360000002 HC RX W HCPCS: Performed by: NURSE PRACTITIONER

## 2019-12-24 PROCEDURE — 2580000003 HC RX 258

## 2019-12-24 PROCEDURE — 1200000000 HC SEMI PRIVATE

## 2019-12-24 PROCEDURE — 7100000011 HC PHASE II RECOVERY - ADDTL 15 MIN: Performed by: INTERNAL MEDICINE

## 2019-12-24 PROCEDURE — 87102 FUNGUS ISOLATION CULTURE: CPT

## 2019-12-24 PROCEDURE — 3609010800 HC BRONCHOSCOPY ALVEOLAR LAVAGE: Performed by: INTERNAL MEDICINE

## 2019-12-24 PROCEDURE — 94761 N-INVAS EAR/PLS OXIMETRY MLT: CPT

## 2019-12-24 PROCEDURE — 2500000003 HC RX 250 WO HCPCS: Performed by: INTERNAL MEDICINE

## 2019-12-24 PROCEDURE — 83735 ASSAY OF MAGNESIUM: CPT

## 2019-12-24 PROCEDURE — 87015 SPECIMEN INFECT AGNT CONCNTJ: CPT

## 2019-12-24 PROCEDURE — 31645 BRNCHSC W/THER ASPIR 1ST: CPT | Performed by: INTERNAL MEDICINE

## 2019-12-24 PROCEDURE — 96361 HYDRATE IV INFUSION ADD-ON: CPT

## 2019-12-24 PROCEDURE — 2700000000 HC OXYGEN THERAPY PER DAY

## 2019-12-24 PROCEDURE — 99233 SBSQ HOSP IP/OBS HIGH 50: CPT | Performed by: INTERNAL MEDICINE

## 2019-12-24 PROCEDURE — 2580000003 HC RX 258: Performed by: INTERNAL MEDICINE

## 2019-12-24 PROCEDURE — 99232 SBSQ HOSP IP/OBS MODERATE 35: CPT | Performed by: INTERNAL MEDICINE

## 2019-12-24 PROCEDURE — 88305 TISSUE EXAM BY PATHOLOGIST: CPT

## 2019-12-24 RX ORDER — POTASSIUM CHLORIDE 20 MEQ/1
40 TABLET, EXTENDED RELEASE ORAL ONCE
Status: COMPLETED | OUTPATIENT
Start: 2019-12-24 | End: 2019-12-24

## 2019-12-24 RX ORDER — SODIUM CHLORIDE 9 MG/ML
INJECTION, SOLUTION INTRAVENOUS
Status: COMPLETED
Start: 2019-12-24 | End: 2019-12-24

## 2019-12-24 RX ORDER — IPRATROPIUM BROMIDE AND ALBUTEROL SULFATE 2.5; .5 MG/3ML; MG/3ML
3 SOLUTION RESPIRATORY (INHALATION)
Status: DISCONTINUED | OUTPATIENT
Start: 2019-12-24 | End: 2019-12-26 | Stop reason: HOSPADM

## 2019-12-24 RX ORDER — FENTANYL CITRATE 50 UG/ML
INJECTION, SOLUTION INTRAMUSCULAR; INTRAVENOUS PRN
Status: DISCONTINUED | OUTPATIENT
Start: 2019-12-24 | End: 2019-12-24 | Stop reason: ALTCHOICE

## 2019-12-24 RX ORDER — MIDAZOLAM HYDROCHLORIDE 5 MG/ML
INJECTION INTRAMUSCULAR; INTRAVENOUS PRN
Status: DISCONTINUED | OUTPATIENT
Start: 2019-12-24 | End: 2019-12-24 | Stop reason: ALTCHOICE

## 2019-12-24 RX ORDER — LEVOFLOXACIN 5 MG/ML
750 INJECTION, SOLUTION INTRAVENOUS EVERY 24 HOURS
Status: DISCONTINUED | OUTPATIENT
Start: 2019-12-24 | End: 2019-12-26 | Stop reason: HOSPADM

## 2019-12-24 RX ADMIN — IPRATROPIUM BROMIDE AND ALBUTEROL SULFATE 3 ML: .5; 3 SOLUTION RESPIRATORY (INHALATION) at 03:12

## 2019-12-24 RX ADMIN — AZTREONAM 1 G: 1 INJECTION, POWDER, LYOPHILIZED, FOR SOLUTION INTRAMUSCULAR; INTRAVENOUS at 14:38

## 2019-12-24 RX ADMIN — PREDNISONE 5 MG: 5 TABLET ORAL at 13:20

## 2019-12-24 RX ADMIN — SODIUM CHLORIDE 250 ML: 9 INJECTION, SOLUTION INTRAVENOUS at 14:38

## 2019-12-24 RX ADMIN — LEVOTHYROXINE SODIUM 150 MCG: 150 TABLET ORAL at 13:20

## 2019-12-24 RX ADMIN — MIRTAZAPINE 30 MG: 30 TABLET, FILM COATED ORAL at 20:39

## 2019-12-24 RX ADMIN — IPRATROPIUM BROMIDE AND ALBUTEROL SULFATE 3 ML: .5; 3 SOLUTION RESPIRATORY (INHALATION) at 14:52

## 2019-12-24 RX ADMIN — BENZONATATE 100 MG: 100 CAPSULE ORAL at 20:42

## 2019-12-24 RX ADMIN — AZELASTINE HYDROCHLORIDE 1 SPRAY: 137 SPRAY, METERED NASAL at 13:11

## 2019-12-24 RX ADMIN — FUROSEMIDE 40 MG: 40 TABLET ORAL at 13:19

## 2019-12-24 RX ADMIN — IPRATROPIUM BROMIDE AND ALBUTEROL SULFATE 3 ML: .5; 3 SOLUTION RESPIRATORY (INHALATION) at 07:27

## 2019-12-24 RX ADMIN — INSULIN LISPRO 1 UNITS: 100 INJECTION, SOLUTION INTRAVENOUS; SUBCUTANEOUS at 20:43

## 2019-12-24 RX ADMIN — Medication 10 ML: at 13:18

## 2019-12-24 RX ADMIN — VANCOMYCIN HYDROCHLORIDE 1000 MG: 1 INJECTION, POWDER, LYOPHILIZED, FOR SOLUTION INTRAVENOUS at 15:28

## 2019-12-24 RX ADMIN — AZELASTINE HYDROCHLORIDE 1 SPRAY: 137 SPRAY, METERED NASAL at 20:39

## 2019-12-24 RX ADMIN — IPRATROPIUM BROMIDE AND ALBUTEROL SULFATE 3 ML: .5; 3 SOLUTION RESPIRATORY (INHALATION) at 22:25

## 2019-12-24 RX ADMIN — IPRATROPIUM BROMIDE AND ALBUTEROL SULFATE 3 ML: .5; 3 SOLUTION RESPIRATORY (INHALATION) at 18:45

## 2019-12-24 RX ADMIN — NADOLOL 20 MG: 40 TABLET ORAL at 13:19

## 2019-12-24 RX ADMIN — POTASSIUM CHLORIDE 40 MEQ: 1500 TABLET, EXTENDED RELEASE ORAL at 13:23

## 2019-12-24 RX ADMIN — Medication 10 ML: at 20:40

## 2019-12-24 RX ADMIN — FLUTICASONE PROPIONATE 1 SPRAY: 50 SPRAY, METERED NASAL at 13:11

## 2019-12-24 RX ADMIN — AZTREONAM 1 G: 1 INJECTION, POWDER, LYOPHILIZED, FOR SOLUTION INTRAMUSCULAR; INTRAVENOUS at 23:04

## 2019-12-24 RX ADMIN — BENZONATATE 100 MG: 100 CAPSULE ORAL at 14:02

## 2019-12-24 RX ADMIN — ACETAMINOPHEN 650 MG: 325 TABLET ORAL at 20:42

## 2019-12-24 RX ADMIN — LEVOFLOXACIN 750 MG: 5 INJECTION, SOLUTION INTRAVENOUS at 15:28

## 2019-12-24 RX ADMIN — SERTRALINE 200 MG: 100 TABLET, FILM COATED ORAL at 13:19

## 2019-12-24 RX ADMIN — POTASSIUM CHLORIDE 20 MEQ: 1500 TABLET, EXTENDED RELEASE ORAL at 13:19

## 2019-12-24 RX ADMIN — INSULIN LISPRO 2 UNITS: 100 INJECTION, SOLUTION INTRAVENOUS; SUBCUTANEOUS at 16:51

## 2019-12-24 RX ADMIN — ENOXAPARIN SODIUM 40 MG: 40 INJECTION SUBCUTANEOUS at 13:18

## 2019-12-24 RX ADMIN — AMITRIPTYLINE HYDROCHLORIDE 25 MG: 25 TABLET, FILM COATED ORAL at 20:39

## 2019-12-24 ASSESSMENT — PAIN - FUNCTIONAL ASSESSMENT: PAIN_FUNCTIONAL_ASSESSMENT: 0-10

## 2019-12-24 ASSESSMENT — PAIN SCALES - GENERAL
PAINLEVEL_OUTOF10: 5
PAINLEVEL_OUTOF10: 3

## 2019-12-25 LAB
ANION GAP SERPL CALCULATED.3IONS-SCNC: 11 MMOL/L (ref 3–16)
BASOPHILS ABSOLUTE: 0 K/UL (ref 0–0.2)
BASOPHILS RELATIVE PERCENT: 0.8 %
BUN BLDV-MCNC: 7 MG/DL (ref 7–20)
CALCIUM SERPL-MCNC: 8.7 MG/DL (ref 8.3–10.6)
CHLORIDE BLD-SCNC: 104 MMOL/L (ref 99–110)
CO2: 26 MMOL/L (ref 21–32)
CREAT SERPL-MCNC: 0.7 MG/DL (ref 0.6–1.2)
EKG ATRIAL RATE: 88 BPM
EKG DIAGNOSIS: NORMAL
EKG P AXIS: 62 DEGREES
EKG P-R INTERVAL: 150 MS
EKG Q-T INTERVAL: 384 MS
EKG QRS DURATION: 80 MS
EKG QTC CALCULATION (BAZETT): 464 MS
EKG R AXIS: 45 DEGREES
EKG T AXIS: 58 DEGREES
EKG VENTRICULAR RATE: 88 BPM
EOSINOPHILS ABSOLUTE: 0.4 K/UL (ref 0–0.6)
EOSINOPHILS RELATIVE PERCENT: 6.2 %
GFR AFRICAN AMERICAN: >60
GFR NON-AFRICAN AMERICAN: >60
GLUCOSE BLD-MCNC: 111 MG/DL (ref 70–99)
GLUCOSE BLD-MCNC: 129 MG/DL (ref 70–99)
GLUCOSE BLD-MCNC: 148 MG/DL (ref 70–99)
GLUCOSE BLD-MCNC: 177 MG/DL (ref 70–99)
GLUCOSE BLD-MCNC: 186 MG/DL (ref 70–99)
HCT VFR BLD CALC: 30.4 % (ref 36–48)
HEMOGLOBIN: 9.3 G/DL (ref 12–16)
LYMPHOCYTES ABSOLUTE: 1.5 K/UL (ref 1–5.1)
LYMPHOCYTES RELATIVE PERCENT: 24.9 %
MCH RBC QN AUTO: 23.3 PG (ref 26–34)
MCHC RBC AUTO-ENTMCNC: 30.7 G/DL (ref 31–36)
MCV RBC AUTO: 75.9 FL (ref 80–100)
MONOCYTES ABSOLUTE: 0.5 K/UL (ref 0–1.3)
MONOCYTES RELATIVE PERCENT: 8.2 %
NEUTROPHILS ABSOLUTE: 3.5 K/UL (ref 1.7–7.7)
NEUTROPHILS RELATIVE PERCENT: 59.9 %
PDW BLD-RTO: 17.6 % (ref 12.4–15.4)
PERFORMED ON: ABNORMAL
PLATELET # BLD: 134 K/UL (ref 135–450)
PMV BLD AUTO: 7.3 FL (ref 5–10.5)
POTASSIUM SERPL-SCNC: 3.3 MMOL/L (ref 3.5–5.1)
RBC # BLD: 4 M/UL (ref 4–5.2)
SODIUM BLD-SCNC: 141 MMOL/L (ref 136–145)
WBC # BLD: 5.8 K/UL (ref 4–11)

## 2019-12-25 PROCEDURE — 6370000000 HC RX 637 (ALT 250 FOR IP): Performed by: INTERNAL MEDICINE

## 2019-12-25 PROCEDURE — 2580000003 HC RX 258: Performed by: NURSE PRACTITIONER

## 2019-12-25 PROCEDURE — 2500000003 HC RX 250 WO HCPCS: Performed by: INTERNAL MEDICINE

## 2019-12-25 PROCEDURE — 99232 SBSQ HOSP IP/OBS MODERATE 35: CPT | Performed by: INTERNAL MEDICINE

## 2019-12-25 PROCEDURE — 80048 BASIC METABOLIC PNL TOTAL CA: CPT

## 2019-12-25 PROCEDURE — 85025 COMPLETE CBC W/AUTO DIFF WBC: CPT

## 2019-12-25 PROCEDURE — 1200000000 HC SEMI PRIVATE

## 2019-12-25 PROCEDURE — 6370000000 HC RX 637 (ALT 250 FOR IP): Performed by: NURSE PRACTITIONER

## 2019-12-25 PROCEDURE — 94669 MECHANICAL CHEST WALL OSCILL: CPT

## 2019-12-25 PROCEDURE — 99233 SBSQ HOSP IP/OBS HIGH 50: CPT | Performed by: INTERNAL MEDICINE

## 2019-12-25 PROCEDURE — 6360000002 HC RX W HCPCS: Performed by: NURSE PRACTITIONER

## 2019-12-25 PROCEDURE — 2580000003 HC RX 258: Performed by: INTERNAL MEDICINE

## 2019-12-25 PROCEDURE — 6360000002 HC RX W HCPCS: Performed by: INTERNAL MEDICINE

## 2019-12-25 PROCEDURE — 2700000000 HC OXYGEN THERAPY PER DAY

## 2019-12-25 PROCEDURE — 94640 AIRWAY INHALATION TREATMENT: CPT

## 2019-12-25 PROCEDURE — 93010 ELECTROCARDIOGRAM REPORT: CPT | Performed by: INTERNAL MEDICINE

## 2019-12-25 PROCEDURE — 94761 N-INVAS EAR/PLS OXIMETRY MLT: CPT

## 2019-12-25 PROCEDURE — 36415 COLL VENOUS BLD VENIPUNCTURE: CPT

## 2019-12-25 RX ORDER — SODIUM CHLORIDE 9 MG/ML
INJECTION, SOLUTION INTRAVENOUS
Status: DISPENSED
Start: 2019-12-25 | End: 2019-12-26

## 2019-12-25 RX ORDER — POTASSIUM CHLORIDE 20 MEQ/1
40 TABLET, EXTENDED RELEASE ORAL ONCE
Status: COMPLETED | OUTPATIENT
Start: 2019-12-25 | End: 2019-12-25

## 2019-12-25 RX ADMIN — INSULIN LISPRO 1 UNITS: 100 INJECTION, SOLUTION INTRAVENOUS; SUBCUTANEOUS at 16:24

## 2019-12-25 RX ADMIN — Medication 10 ML: at 09:00

## 2019-12-25 RX ADMIN — AZELASTINE HYDROCHLORIDE 1 SPRAY: 137 SPRAY, METERED NASAL at 09:00

## 2019-12-25 RX ADMIN — FUROSEMIDE 40 MG: 40 TABLET ORAL at 09:01

## 2019-12-25 RX ADMIN — LORAZEPAM 0.5 MG: 0.5 TABLET ORAL at 17:51

## 2019-12-25 RX ADMIN — IPRATROPIUM BROMIDE AND ALBUTEROL SULFATE 3 ML: .5; 3 SOLUTION RESPIRATORY (INHALATION) at 14:35

## 2019-12-25 RX ADMIN — INSULIN LISPRO 1 UNITS: 100 INJECTION, SOLUTION INTRAVENOUS; SUBCUTANEOUS at 11:52

## 2019-12-25 RX ADMIN — POTASSIUM CHLORIDE 40 MEQ: 1500 TABLET, EXTENDED RELEASE ORAL at 09:39

## 2019-12-25 RX ADMIN — BENZONATATE 100 MG: 100 CAPSULE ORAL at 21:36

## 2019-12-25 RX ADMIN — IPRATROPIUM BROMIDE AND ALBUTEROL SULFATE 3 ML: .5; 3 SOLUTION RESPIRATORY (INHALATION) at 22:50

## 2019-12-25 RX ADMIN — ENOXAPARIN SODIUM 40 MG: 40 INJECTION SUBCUTANEOUS at 09:01

## 2019-12-25 RX ADMIN — AZELASTINE HYDROCHLORIDE 1 SPRAY: 137 SPRAY, METERED NASAL at 21:15

## 2019-12-25 RX ADMIN — LEVOTHYROXINE SODIUM 150 MCG: 150 TABLET ORAL at 09:00

## 2019-12-25 RX ADMIN — Medication 10 ML: at 21:15

## 2019-12-25 RX ADMIN — FLUTICASONE PROPIONATE 1 SPRAY: 50 SPRAY, METERED NASAL at 09:00

## 2019-12-25 RX ADMIN — LEVOFLOXACIN 750 MG: 5 INJECTION, SOLUTION INTRAVENOUS at 15:53

## 2019-12-25 RX ADMIN — PREDNISONE 5 MG: 5 TABLET ORAL at 09:01

## 2019-12-25 RX ADMIN — MIRTAZAPINE 30 MG: 30 TABLET, FILM COATED ORAL at 21:14

## 2019-12-25 RX ADMIN — NADOLOL 20 MG: 40 TABLET ORAL at 09:01

## 2019-12-25 RX ADMIN — INSULIN LISPRO 1 UNITS: 100 INJECTION, SOLUTION INTRAVENOUS; SUBCUTANEOUS at 21:18

## 2019-12-25 RX ADMIN — POTASSIUM CHLORIDE 20 MEQ: 1500 TABLET, EXTENDED RELEASE ORAL at 09:00

## 2019-12-25 RX ADMIN — AZTREONAM 1 G: 1 INJECTION, POWDER, LYOPHILIZED, FOR SOLUTION INTRAMUSCULAR; INTRAVENOUS at 21:36

## 2019-12-25 RX ADMIN — PANTOPRAZOLE SODIUM 40 MG: 40 TABLET, DELAYED RELEASE ORAL at 07:04

## 2019-12-25 RX ADMIN — SERTRALINE 200 MG: 100 TABLET, FILM COATED ORAL at 09:01

## 2019-12-25 RX ADMIN — IPRATROPIUM BROMIDE AND ALBUTEROL SULFATE 3 ML: .5; 3 SOLUTION RESPIRATORY (INHALATION) at 10:39

## 2019-12-25 RX ADMIN — IPRATROPIUM BROMIDE AND ALBUTEROL SULFATE 3 ML: .5; 3 SOLUTION RESPIRATORY (INHALATION) at 06:43

## 2019-12-25 RX ADMIN — AZTREONAM 1 G: 1 INJECTION, POWDER, LYOPHILIZED, FOR SOLUTION INTRAMUSCULAR; INTRAVENOUS at 14:31

## 2019-12-25 RX ADMIN — AMITRIPTYLINE HYDROCHLORIDE 25 MG: 25 TABLET, FILM COATED ORAL at 21:15

## 2019-12-25 RX ADMIN — VANCOMYCIN HYDROCHLORIDE 1000 MG: 1 INJECTION, POWDER, LYOPHILIZED, FOR SOLUTION INTRAVENOUS at 09:48

## 2019-12-25 RX ADMIN — BENZONATATE 100 MG: 100 CAPSULE ORAL at 07:06

## 2019-12-25 RX ADMIN — TRAZODONE HYDROCHLORIDE 50 MG: 50 TABLET ORAL at 21:36

## 2019-12-25 RX ADMIN — IPRATROPIUM BROMIDE AND ALBUTEROL SULFATE 3 ML: .5; 3 SOLUTION RESPIRATORY (INHALATION) at 18:35

## 2019-12-25 RX ADMIN — AZTREONAM 1 G: 1 INJECTION, POWDER, LYOPHILIZED, FOR SOLUTION INTRAMUSCULAR; INTRAVENOUS at 07:04

## 2019-12-26 VITALS
DIASTOLIC BLOOD PRESSURE: 80 MMHG | HEART RATE: 86 BPM | HEIGHT: 63 IN | SYSTOLIC BLOOD PRESSURE: 128 MMHG | WEIGHT: 215.38 LBS | RESPIRATION RATE: 18 BRPM | OXYGEN SATURATION: 98 % | BODY MASS INDEX: 38.16 KG/M2 | TEMPERATURE: 97.2 F

## 2019-12-26 LAB
ANION GAP SERPL CALCULATED.3IONS-SCNC: 12 MMOL/L (ref 3–16)
BUN BLDV-MCNC: 8 MG/DL (ref 7–20)
CALCIUM SERPL-MCNC: 8.8 MG/DL (ref 8.3–10.6)
CHLORIDE BLD-SCNC: 98 MMOL/L (ref 99–110)
CO2: 25 MMOL/L (ref 21–32)
CREAT SERPL-MCNC: 0.7 MG/DL (ref 0.6–1.2)
CULTURE, RESPIRATORY: NORMAL
CULTURE, RESPIRATORY: NORMAL
GFR AFRICAN AMERICAN: >60
GFR NON-AFRICAN AMERICAN: >60
GLUCOSE BLD-MCNC: 152 MG/DL (ref 70–99)
GLUCOSE BLD-MCNC: 182 MG/DL (ref 70–99)
GLUCOSE BLD-MCNC: 197 MG/DL (ref 70–99)
GRAM STAIN RESULT: NORMAL
GRAM STAIN RESULT: NORMAL
PERFORMED ON: ABNORMAL
PERFORMED ON: ABNORMAL
POTASSIUM SERPL-SCNC: 3.1 MMOL/L (ref 3.5–5.1)
SODIUM BLD-SCNC: 135 MMOL/L (ref 136–145)

## 2019-12-26 PROCEDURE — 2580000003 HC RX 258: Performed by: NURSE PRACTITIONER

## 2019-12-26 PROCEDURE — 6370000000 HC RX 637 (ALT 250 FOR IP): Performed by: NURSE PRACTITIONER

## 2019-12-26 PROCEDURE — 2580000003 HC RX 258: Performed by: INTERNAL MEDICINE

## 2019-12-26 PROCEDURE — 94761 N-INVAS EAR/PLS OXIMETRY MLT: CPT

## 2019-12-26 PROCEDURE — 2700000000 HC OXYGEN THERAPY PER DAY

## 2019-12-26 PROCEDURE — 6370000000 HC RX 637 (ALT 250 FOR IP): Performed by: INTERNAL MEDICINE

## 2019-12-26 PROCEDURE — 99238 HOSP IP/OBS DSCHRG MGMT 30/<: CPT | Performed by: INTERNAL MEDICINE

## 2019-12-26 PROCEDURE — 36415 COLL VENOUS BLD VENIPUNCTURE: CPT

## 2019-12-26 PROCEDURE — 94640 AIRWAY INHALATION TREATMENT: CPT

## 2019-12-26 PROCEDURE — 80048 BASIC METABOLIC PNL TOTAL CA: CPT

## 2019-12-26 PROCEDURE — 2500000003 HC RX 250 WO HCPCS: Performed by: INTERNAL MEDICINE

## 2019-12-26 PROCEDURE — 6360000002 HC RX W HCPCS: Performed by: NURSE PRACTITIONER

## 2019-12-26 PROCEDURE — 6360000002 HC RX W HCPCS: Performed by: INTERNAL MEDICINE

## 2019-12-26 PROCEDURE — 99232 SBSQ HOSP IP/OBS MODERATE 35: CPT | Performed by: INTERNAL MEDICINE

## 2019-12-26 PROCEDURE — 94669 MECHANICAL CHEST WALL OSCILL: CPT

## 2019-12-26 RX ORDER — LEVOFLOXACIN 500 MG/1
500 TABLET, FILM COATED ORAL DAILY
Qty: 10 TABLET | Refills: 0 | Status: SHIPPED | OUTPATIENT
Start: 2019-12-26 | End: 2020-01-05

## 2019-12-26 RX ORDER — POTASSIUM CHLORIDE 20 MEQ/1
40 TABLET, EXTENDED RELEASE ORAL ONCE
Status: COMPLETED | OUTPATIENT
Start: 2019-12-26 | End: 2019-12-26

## 2019-12-26 RX ADMIN — IPRATROPIUM BROMIDE AND ALBUTEROL SULFATE 3 ML: .5; 3 SOLUTION RESPIRATORY (INHALATION) at 06:46

## 2019-12-26 RX ADMIN — ENOXAPARIN SODIUM 40 MG: 40 INJECTION SUBCUTANEOUS at 08:56

## 2019-12-26 RX ADMIN — DEXTROMETHORPHAN HYDROBROMIDE AND PROMETHAZINE HYDROCHLORIDE 5 ML: 15; 6.25 SOLUTION ORAL at 02:01

## 2019-12-26 RX ADMIN — PREDNISONE 5 MG: 5 TABLET ORAL at 08:58

## 2019-12-26 RX ADMIN — FUROSEMIDE 40 MG: 40 TABLET ORAL at 08:58

## 2019-12-26 RX ADMIN — NADOLOL 20 MG: 40 TABLET ORAL at 08:58

## 2019-12-26 RX ADMIN — POTASSIUM CHLORIDE 40 MEQ: 1500 TABLET, EXTENDED RELEASE ORAL at 09:03

## 2019-12-26 RX ADMIN — SERTRALINE 200 MG: 100 TABLET, FILM COATED ORAL at 08:56

## 2019-12-26 RX ADMIN — INSULIN LISPRO 1 UNITS: 100 INJECTION, SOLUTION INTRAVENOUS; SUBCUTANEOUS at 11:57

## 2019-12-26 RX ADMIN — IPRATROPIUM BROMIDE AND ALBUTEROL SULFATE 3 ML: .5; 3 SOLUTION RESPIRATORY (INHALATION) at 11:12

## 2019-12-26 RX ADMIN — Medication 10 ML: at 09:03

## 2019-12-26 RX ADMIN — FLUTICASONE PROPIONATE 1 SPRAY: 50 SPRAY, METERED NASAL at 08:56

## 2019-12-26 RX ADMIN — VANCOMYCIN HYDROCHLORIDE 1000 MG: 1 INJECTION, POWDER, LYOPHILIZED, FOR SOLUTION INTRAVENOUS at 04:30

## 2019-12-26 RX ADMIN — INSULIN LISPRO 1 UNITS: 100 INJECTION, SOLUTION INTRAVENOUS; SUBCUTANEOUS at 08:04

## 2019-12-26 RX ADMIN — AZTREONAM 1 G: 1 INJECTION, POWDER, LYOPHILIZED, FOR SOLUTION INTRAMUSCULAR; INTRAVENOUS at 07:00

## 2019-12-26 RX ADMIN — POTASSIUM CHLORIDE 20 MEQ: 1500 TABLET, EXTENDED RELEASE ORAL at 08:58

## 2019-12-26 RX ADMIN — AZELASTINE HYDROCHLORIDE 1 SPRAY: 137 SPRAY, METERED NASAL at 08:56

## 2019-12-26 RX ADMIN — LEVOTHYROXINE SODIUM 150 MCG: 150 TABLET ORAL at 08:58

## 2019-12-26 RX ADMIN — PANTOPRAZOLE SODIUM 40 MG: 40 TABLET, DELAYED RELEASE ORAL at 07:00

## 2019-12-27 ENCOUNTER — CARE COORDINATION (OUTPATIENT)
Dept: CASE MANAGEMENT | Age: 64
End: 2019-12-27

## 2019-12-27 LAB
BLOOD CULTURE, ROUTINE: NORMAL
CULTURE, BLOOD 2: NORMAL

## 2019-12-28 ENCOUNTER — CARE COORDINATION (OUTPATIENT)
Dept: CASE MANAGEMENT | Age: 64
End: 2019-12-28

## 2019-12-30 ENCOUNTER — CARE COORDINATION (OUTPATIENT)
Dept: CASE MANAGEMENT | Age: 64
End: 2019-12-30

## 2019-12-30 RX ORDER — NADOLOL 20 MG/1
20 TABLET ORAL DAILY
Qty: 30 TABLET | Refills: 1 | Status: SHIPPED | OUTPATIENT
Start: 2019-12-30 | End: 2020-01-03 | Stop reason: SDUPTHER

## 2020-01-03 ENCOUNTER — OFFICE VISIT (OUTPATIENT)
Dept: FAMILY MEDICINE CLINIC | Age: 65
End: 2020-01-03
Payer: COMMERCIAL

## 2020-01-03 VITALS
BODY MASS INDEX: 35.85 KG/M2 | WEIGHT: 202.4 LBS | OXYGEN SATURATION: 92 % | SYSTOLIC BLOOD PRESSURE: 132 MMHG | DIASTOLIC BLOOD PRESSURE: 80 MMHG | TEMPERATURE: 97.6 F | HEART RATE: 80 BPM

## 2020-01-03 PROCEDURE — 99495 TRANSJ CARE MGMT MOD F2F 14D: CPT | Performed by: FAMILY MEDICINE

## 2020-01-03 PROCEDURE — 1111F DSCHRG MED/CURRENT MED MERGE: CPT | Performed by: FAMILY MEDICINE

## 2020-01-03 RX ORDER — NADOLOL 20 MG/1
20 TABLET ORAL DAILY
Qty: 30 TABLET | Refills: 3 | Status: SHIPPED | OUTPATIENT
Start: 2020-01-03 | End: 2020-02-18 | Stop reason: SDUPTHER

## 2020-01-03 ASSESSMENT — PATIENT HEALTH QUESTIONNAIRE - PHQ9
SUM OF ALL RESPONSES TO PHQ QUESTIONS 1-9: 0
2. FEELING DOWN, DEPRESSED OR HOPELESS: 0
SUM OF ALL RESPONSES TO PHQ9 QUESTIONS 1 & 2: 0
SUM OF ALL RESPONSES TO PHQ QUESTIONS 1-9: 0
1. LITTLE INTEREST OR PLEASURE IN DOING THINGS: 0

## 2020-01-03 NOTE — PROGRESS NOTES
Tolerates Meropenem.  Doxycycline Hives    Imitrex [Sumatriptan] Other (See Comments)     Feels like pins and needles    Meperidine     Sulfa Antibiotics Hives    Keflex [Cephalexin] Itching and Rash     Tolerates Meropenem.  Tape Adelbert Thelma Tape] Rash       Medications listed as ordered at the time of discharge from [facility] Medication Instructions MARKOS:    Printed on:01/03/20 2774   Medication Information                      albuterol (PROVENTIL) (2.5 MG/3ML) 0.083% nebulizer solution  Take 3 mLs by nebulization every 6 hours as needed for Wheezing             albuterol sulfate HFA (PROVENTIL HFA) 108 (90 Base) MCG/ACT inhaler  Inhale 2 puffs into the lungs every 6 hours as needed for Wheezing             amitriptyline (ELAVIL) 25 MG tablet  Take 1 tablet by mouth nightly             Azelastine-Fluticasone (DYMISTA) 137-50 MCG/ACT SUSP  1 spray by Nasal route daily              benzonatate (TESSALON) 200 MG capsule  TAKE 1 CAPSULE BY MOUTH THREE TIMES A DAY AS NEEDED FOR COUGH             blood glucose test strips (ONETOUCH VERIO) strip  1 each by In Vitro route daily As needed. blood glucose test strips (ONETOUCH VERIO) strip  1 each by In Vitro route 2 times daily As needed.              dapagliflozin (FARXIGA) 10 MG tablet  Take 1 tablet by mouth every morning             empagliflozin (JARDIANCE) 25 MG tablet  Take 1 tablet by mouth daily             Esomeprazole Magnesium (NEXIUM 24HR PO)  Take by mouth 2 times daily              furosemide (LASIX) 40 MG tablet  Take 1 tablet by mouth daily             ipratropium-albuterol (DUONEB) 0.5-2.5 (3) MG/3ML SOLN nebulizer solution  Inhale 3 mLs into the lungs every 6 hours as needed for Shortness of Breath             levofloxacin (LEVAQUIN) 500 MG tablet  Take 1 tablet by mouth daily for 10 days             levothyroxine (SYNTHROID) 150 MCG tablet  TAKE 1 TABLET BY MOUTH EVERY DAY             LORazepam (ATIVAN) 0.5 MG tablet  Take 1 tablet by mouth every 6 hours as needed for Anxiety for up to 180 days. mirtazapine (REMERON) 30 MG tablet  TAKE 1 TABLET BY MOUTH EVERY DAY AT NIGHT             nadolol (CORGARD) 20 MG tablet  Take 1 tablet by mouth daily             OXYGEN  Inhale 3 L into the lungs             potassium chloride (KLOR-CON M) 20 MEQ extended release tablet  Take 1 tablet by mouth daily             predniSONE (DELTASONE) 5 MG tablet  Take 5 mg by mouth daily              promethazine-dextromethorphan (PROMETHAZINE-DM) 6.25-15 MG/5ML syrup  Take 5 mLs by mouth 4 times daily as needed for Cough             sertraline (ZOLOFT) 100 MG tablet  Take 2 tablets by mouth daily             SITagliptin (JANUVIA) 100 MG tablet  Take 1 tablet by mouth daily             traZODone (DESYREL) 50 MG tablet  TAKE 1 TABLET BY MOUTH NIGHTLY                   Medications marked \"taking\" at this time  Outpatient Medications Marked as Taking for the 1/3/20 encounter (Office Visit) with Lina Quispe MD   Medication Sig Dispense Refill    empagliflozin (JARDIANCE) 25 MG tablet Take 1 tablet by mouth daily 90 tablet 1        Medications patient taking as of now reconciled against medications ordered at time of hospital discharge: Yes    Chief Complaint   Patient presents with    Follow-Up from Hospital     respiratory failure       History of Present illness - Follow up of Hospital diagnosis(es): Pneumonia, chronic hypoxic respiratory failure, hyponatremia, hypokalemia, DM, chronic diastolic CHF, paroxysmal atrial fibrillation, hypothyroidism, iron deficiency anemia. Inpatient course: Discharge summary reviewed- see chart. Interval history/Current status: Patient states she is doing much better. She is still taking her Levaquin for total of 10 days. She is still on her prednisone. She is due to follow-up with pulmonary in the next couple of weeks. Denies any fever.   She does wear oxygen most of the time although she does note with her today. A comprehensive review of systems was negative except for what was noted in the HPI. Vitals:    01/03/20 1605   BP: 132/80   Site: Left Upper Arm   Position: Sitting   Cuff Size: Medium Adult   Pulse: 80   Temp: 97.6 °F (36.4 °C)   TempSrc: Temporal   SpO2: 92%   Weight: 202 lb 6.4 oz (91.8 kg)     Body mass index is 35.85 kg/m². Wt Readings from Last 3 Encounters:   01/03/20 202 lb 6.4 oz (91.8 kg)   12/26/19 215 lb 6 oz (97.7 kg)   12/12/19 209 lb (94.8 kg)     BP Readings from Last 3 Encounters:   01/03/20 132/80   12/26/19 128/80   12/12/19 128/76        Physical Exam:  General Appearance: alert and oriented to person, place and time, well developed and well- nourished, in no acute distress, obese  Skin: warm and dry, no rash or erythema  Head: normocephalic and atraumatic  Eyes: pupils equal, round, and reactive to light, extraocular eye movements intact, conjunctivae normal  ENT: tympanic membrane, external ear and ear canal normal bilaterally, nose without deformity, nasal mucosa and turbinates normal without polyps  Neck: supple and non-tender without mass, no thyromegaly or thyroid nodules, no cervical lymphadenopathy  Pulmonary/Chest: clear to auscultation bilaterally- no wheezes, rales or rhonchi, normal air movement, no respiratory distress  Cardiovascular: normal rate, regular rhythm, normal S1 and S2, no murmurs, rubs, clicks, or gallops, distal pulses intact, no carotid bruits  Extremities: no cyanosis, clubbing or edema  Musculoskeletal: normal range of motion, no joint swelling, deformity or tenderness  Neurologic: reflexes normal and symmetric, no cranial nerve deficit, gait, coordination and speech normal    Assessment/Plan:  1. Pneumonia of lower lobe due to infectious organism, unspecified laterality ([facility] [address]  Improved afebrile on Levaquin  - [place] DISCHARGE MEDS RECONCILED W/ CURRENT OUTPATIENT MED LIST    2.  Hospital discharge follow-up    - CO DISCHARGE MEDS

## 2020-01-03 NOTE — PATIENT INSTRUCTIONS
Patient Education        Pneumonia: Care Instructions  Your Care Instructions    Pneumonia is an infection of the lungs. Most cases are caused by infections from bacteria or viruses. Pneumonia may be mild or very severe. If it is caused by bacteria, you will be treated with antibiotics. It may take a few weeks to a few months to recover fully from pneumonia, depending on how sick you were and whether your overall health is good. Follow-up care is a key part of your treatment and safety. Be sure to make and go to all appointments, and call your doctor if you are having problems. It's also a good idea to know your test results and keep a list of the medicines you take. How can you care for yourself at home? · Take your antibiotics exactly as directed. Do not stop taking the medicine just because you are feeling better. You need to take the full course of antibiotics. · Take your medicines exactly as prescribed. Call your doctor if you think you are having a problem with your medicine. · Get plenty of rest and sleep. You may feel weak and tired for a while, but your energy level will improve with time. · To prevent dehydration, drink plenty of fluids, enough so that your urine is light yellow or clear like water. Choose water and other caffeine-free clear liquids until you feel better. If you have kidney, heart, or liver disease and have to limit fluids, talk with your doctor before you increase the amount of fluids you drink. · Take care of your cough so you can rest. A cough that brings up mucus from your lungs is common with pneumonia. It is one way your body gets rid of the infection. But if coughing keeps you from resting or causes severe fatigue and chest-wall pain, talk to your doctor. He or she may suggest that you take a medicine to reduce the cough. · Use a vaporizer or humidifier to add moisture to your bedroom. Follow the directions for cleaning the machine.   · Do not smoke or allow others to smoke around you. Smoke will make your cough last longer. If you need help quitting, talk to your doctor about stop-smoking programs and medicines. These can increase your chances of quitting for good. · Take an over-the-counter pain medicine, such as acetaminophen (Tylenol), ibuprofen (Advil, Motrin), or naproxen (Aleve). Read and follow all instructions on the label. · Do not take two or more pain medicines at the same time unless the doctor told you to. Many pain medicines have acetaminophen, which is Tylenol. Too much acetaminophen (Tylenol) can be harmful. · If you were given a spirometer to measure how well your lungs are working, use it as instructed. This can help your doctor tell how your recovery is going. · To prevent pneumonia in the future, talk to your doctor about getting a flu vaccine (once a year) and a pneumococcal vaccine (one time only for most people). When should you call for help? Call 911 anytime you think you may need emergency care. For example, call if:    · You have severe trouble breathing.    Call your doctor now or seek immediate medical care if:    · You cough up dark brown or bloody mucus (sputum).     · You have new or worse trouble breathing.     · You are dizzy or lightheaded, or you feel like you may faint.    Watch closely for changes in your health, and be sure to contact your doctor if:    · You have a new or higher fever.     · You are coughing more deeply or more often.     · You are not getting better after 2 days (48 hours).     · You do not get better as expected. Where can you learn more? Go to https://[employer].[facility]. org and sign in to your [employer] account. Enter D336 in the [place] box to learn more about \"Pneumonia: Care Instructions. \"     If you do not have an account, please click on the \"Sign Up Now\" link. Current as of: September 5, 2018  Content Version: 12.1  © 6846-1085 Healthwise, Incorporated.  Care instructions adapted

## 2020-01-06 ENCOUNTER — TELEPHONE (OUTPATIENT)
Dept: FAMILY MEDICINE CLINIC | Age: 65
End: 2020-01-06

## 2020-01-06 ENCOUNTER — CARE COORDINATION (OUTPATIENT)
Dept: CASE MANAGEMENT | Age: 65
End: 2020-01-06

## 2020-01-06 NOTE — TELEPHONE ENCOUNTER
Feroz Sanchez with [address] called stating patient's fasting blood sugar was 159 at 10:49 am this morning

## 2020-01-08 RX ORDER — PREDNISONE 1 MG/1
10 TABLET ORAL DAILY
Qty: 60 TABLET | Refills: 0 | Status: SHIPPED | OUTPATIENT
Start: 2020-01-08 | End: 2020-02-03

## 2020-01-09 ENCOUNTER — CARE COORDINATION (OUTPATIENT)
Dept: CARE COORDINATION | Age: 65
End: 2020-01-09

## 2020-01-09 NOTE — CARE COORDINATION
Noted that pt is being followed by Care Transitions currently. Will plan to resume ACM outreach once Care Transitions is complete. Deandre Kay RN  Ambulatory Care Manager  [phone] office/cell  [phone] fax  [email or web address]

## 2020-01-10 ENCOUNTER — CARE COORDINATION (OUTPATIENT)
Dept: CASE MANAGEMENT | Age: 65
End: 2020-01-10

## 2020-01-10 ENCOUNTER — OFFICE VISIT (OUTPATIENT)
Dept: ORTHOPEDIC SURGERY | Age: 65
End: 2020-01-10
Payer: COMMERCIAL

## 2020-01-10 VITALS — BODY MASS INDEX: 35.86 KG/M2 | HEIGHT: 63 IN | WEIGHT: 202.38 LBS | RESPIRATION RATE: 17 BRPM

## 2020-01-10 PROBLEM — R20.0 NUMBNESS AND TINGLING IN LEFT HAND: Status: ACTIVE | Noted: 2020-01-10

## 2020-01-10 PROBLEM — R20.2 NUMBNESS AND TINGLING IN LEFT HAND: Status: ACTIVE | Noted: 2020-01-10

## 2020-01-10 PROCEDURE — 1090F PRES/ABSN URINE INCON ASSESS: CPT | Performed by: ORTHOPAEDIC SURGERY

## 2020-01-10 PROCEDURE — 3017F COLORECTAL CA SCREEN DOC REV: CPT | Performed by: ORTHOPAEDIC SURGERY

## 2020-01-10 PROCEDURE — 99203 OFFICE O/P NEW LOW 30 MIN: CPT | Performed by: ORTHOPAEDIC SURGERY

## 2020-01-10 PROCEDURE — G8427 DOCREV CUR MEDS BY ELIG CLIN: HCPCS | Performed by: ORTHOPAEDIC SURGERY

## 2020-01-10 PROCEDURE — 1123F ACP DISCUSS/DSCN MKR DOCD: CPT | Performed by: ORTHOPAEDIC SURGERY

## 2020-01-10 PROCEDURE — G8482 FLU IMMUNIZE ORDER/ADMIN: HCPCS | Performed by: ORTHOPAEDIC SURGERY

## 2020-01-10 PROCEDURE — 1111F DSCHRG MED/CURRENT MED MERGE: CPT | Performed by: ORTHOPAEDIC SURGERY

## 2020-01-10 PROCEDURE — 1036F TOBACCO NON-USER: CPT | Performed by: ORTHOPAEDIC SURGERY

## 2020-01-10 PROCEDURE — G8400 PT W/DXA NO RESULTS DOC: HCPCS | Performed by: ORTHOPAEDIC SURGERY

## 2020-01-10 PROCEDURE — 4040F PNEUMOC VAC/ADMIN/RCVD: CPT | Performed by: ORTHOPAEDIC SURGERY

## 2020-01-10 PROCEDURE — G8417 CALC BMI ABV UP PARAM F/U: HCPCS | Performed by: ORTHOPAEDIC SURGERY

## 2020-01-10 NOTE — PROGRESS NOTES
Chief Complaint  Hand Pain (NP LT HAND NUMBNESS/TINGLING)      History of Present Illness: Denice Sweet is a 75 y.o. female. She presents today for a new hand surgery specialty evaluation at the recommendation of Dr. Jonathan Naranjo regarding her left hand. She has had a year of occasional numbness and tingling mostly along the ulnar aspect of the hand, but more recently after hospitalization she had significant increase in symptoms along with a feeling of cramping along the ring and small fingers and difficulty with fine motor dexterity. She is now having a difficult time holding onto a phone and doing embroidery. She has had some cramping and aching in the upper arms on both extremities. Medical History  Patient's medications, allergies, past medical, surgical, social and family histories were reviewed and updated as appropriate. Review of Systems  Pertinent items are noted in HPI  Denies fever, chills, confusion, bowel/bladder active change. Review of systems reviewed from Patient History Form dated on 1/10/20 and available in the patient's chart under the Media tab. Vital Signs  Vitals:    01/10/20 1026   Resp: 17       General Exam:   Constitutional: Patient is adequately groomed with no evidence of malnutrition  Mental Status: The patient is oriented to time, place and person. The patient's mood and affect are appropriate. Lymphatic: The lymphatic examination bilaterally reveals all areas to be without enlargement or induration. Neurological: The patient has good coordination. There is subjective decreased sensation to the ring and small fingers including the dorsal aspect of the ulnar hand. Hand Examination  Inspection: No visible dramatic atrophy or discoloration    Palpation: There is no specific tenderness over the carpal tunnel or the flexor tendon sheaths. Range of Motion: The patient demonstrates full range of motion of the fingers without triggering    Strength:  There

## 2020-01-10 NOTE — CARE COORDINATION
[facility] Transitions Follow Up Call    1/10/2020    Patient: Danitza Hall  Patient : 1955   MRN: [id number]  Reason for Admission: PNA  Discharge Date: 19 RARS: Readmission Risk Score: 41       Unable to reach patient by phone. Message left stating purpose of call with contact information requesting return call. Care transition outreach complete. Will notify ACM.        Follow Up  Future Appointments   Date Time Provider Calvin Mcmullen   2020 10:40 AM [facility] MAMMO RM 1 [facility] MAMMO [facility]   2020 12:30 PM MHC US RM [address] Rad   2020  9:30 AM MD EDE Calderon AND MNOO   2020 10:15 AM MD EDE Rodrigues AND MMA   2020 11:45 AM Chanel Palma MD [facility] PULM MMA       Jennifer Metz RN

## 2020-01-13 ENCOUNTER — TELEPHONE (OUTPATIENT)
Dept: FAMILY MEDICINE CLINIC | Age: 65
End: 2020-01-13

## 2020-01-13 NOTE — TELEPHONE ENCOUNTER
Patient has been having upper extremity muscle pain/spasm and concerned it may be d/t her potassium.  Asking if she could have an order for a blood draw to check

## 2020-01-16 ENCOUNTER — HOSPITAL ENCOUNTER (OUTPATIENT)
Age: 65
Discharge: HOME | End: 2020-01-16
Payer: COMMERCIAL

## 2020-01-16 ENCOUNTER — HOSPITAL ENCOUNTER (OUTPATIENT)
Dept: MAMMOGRAPHY | Age: 65
Discharge: HOME | End: 2020-01-16
Payer: COMMERCIAL

## 2020-01-16 ENCOUNTER — HOSPITAL ENCOUNTER (OUTPATIENT)
Dept: ULTRASOUND IMAGING | Age: 65
Discharge: HOME | End: 2020-01-16
Payer: COMMERCIAL

## 2020-01-16 LAB — POTASSIUM SERPL-SCNC: 3.6 MMOL/L (ref 3.5–5.1)

## 2020-01-16 PROCEDURE — 84132 ASSAY OF SERUM POTASSIUM: CPT

## 2020-01-16 PROCEDURE — 36415 COLL VENOUS BLD VENIPUNCTURE: CPT

## 2020-01-16 PROCEDURE — 76642 ULTRASOUND BREAST LIMITED: CPT

## 2020-01-16 PROCEDURE — G0279 TOMOSYNTHESIS, MAMMO: HCPCS

## 2020-01-17 ENCOUNTER — TELEPHONE (OUTPATIENT)
Dept: FAMILY MEDICINE CLINIC | Age: 65
End: 2020-01-17

## 2020-01-17 NOTE — TELEPHONE ENCOUNTER
Christina Cardoza called in and asking for a return call back [phone] due to Ms. 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 She is not getting sleep either, Please Advise.

## 2020-01-20 NOTE — TELEPHONE ENCOUNTER
Spoke to Berry Brunner with [facility]. She stated she was just wanting to make you aware since the patient is not able to participate in the therapy very well due to the pain.

## 2020-01-22 RX ORDER — ALBUTEROL SULFATE 2.5 MG/3ML
SOLUTION RESPIRATORY (INHALATION)
Qty: 375 ML | Refills: 5 | Status: SHIPPED | OUTPATIENT
Start: 2020-01-22 | End: 2020-05-22 | Stop reason: SDUPTHER

## 2020-01-23 ENCOUNTER — OFFICE VISIT (OUTPATIENT)
Dept: ORTHOPEDIC SURGERY | Age: 65
End: 2020-01-23
Payer: COMMERCIAL

## 2020-01-23 VITALS — BODY MASS INDEX: 35.86 KG/M2 | WEIGHT: 202.38 LBS | HEIGHT: 63 IN | RESPIRATION RATE: 17 BRPM

## 2020-01-23 PROBLEM — G56.22 ULNAR NEUROPATHY AT ELBOW OF LEFT UPPER EXTREMITY: Status: ACTIVE | Noted: 2020-01-23

## 2020-01-23 PROCEDURE — 1123F ACP DISCUSS/DSCN MKR DOCD: CPT | Performed by: ORTHOPAEDIC SURGERY

## 2020-01-23 PROCEDURE — G8400 PT W/DXA NO RESULTS DOC: HCPCS | Performed by: ORTHOPAEDIC SURGERY

## 2020-01-23 PROCEDURE — 4040F PNEUMOC VAC/ADMIN/RCVD: CPT | Performed by: ORTHOPAEDIC SURGERY

## 2020-01-23 PROCEDURE — 99214 OFFICE O/P EST MOD 30 MIN: CPT | Performed by: ORTHOPAEDIC SURGERY

## 2020-01-23 PROCEDURE — 1090F PRES/ABSN URINE INCON ASSESS: CPT | Performed by: ORTHOPAEDIC SURGERY

## 2020-01-23 PROCEDURE — 95908 NRV CNDJ TST 3-4 STUDIES: CPT | Performed by: PHYSICAL MEDICINE & REHABILITATION

## 2020-01-23 PROCEDURE — G8427 DOCREV CUR MEDS BY ELIG CLIN: HCPCS | Performed by: ORTHOPAEDIC SURGERY

## 2020-01-23 PROCEDURE — 3017F COLORECTAL CA SCREEN DOC REV: CPT | Performed by: ORTHOPAEDIC SURGERY

## 2020-01-23 PROCEDURE — 95886 MUSC TEST DONE W/N TEST COMP: CPT | Performed by: PHYSICAL MEDICINE & REHABILITATION

## 2020-01-23 PROCEDURE — 1036F TOBACCO NON-USER: CPT | Performed by: ORTHOPAEDIC SURGERY

## 2020-01-23 PROCEDURE — G8417 CALC BMI ABV UP PARAM F/U: HCPCS | Performed by: ORTHOPAEDIC SURGERY

## 2020-01-23 PROCEDURE — 1111F DSCHRG MED/CURRENT MED MERGE: CPT | Performed by: ORTHOPAEDIC SURGERY

## 2020-01-23 PROCEDURE — G8482 FLU IMMUNIZE ORDER/ADMIN: HCPCS | Performed by: ORTHOPAEDIC SURGERY

## 2020-01-23 NOTE — PROGRESS NOTES
Chief Complaint:  Results (TR EMG LUE)      History of Present of Illness: The patient returns and reports that she continues to feel significant loss of dexterity into the left hand and numbness and tingling into the left ring and small fingers. She has had some upper arm discomfort on both sides especially at nighttime which is difficult to completely relate to the current hand numbness, but she has been noticing that as well. She did have the electrodiagnostic studies performed today and it did demonstrate significant slowing of the ulnar nerve at the left elbow down to 21 m/s. Despite the weakness in the hand she did not have any endorgan muscle changes noted on the EMG portion. Review of Systems  Pertinent items are noted in HPI  Denies fever, chills, confusion, bowel/bladder active change. Review of systems reviewed from Patient History Form dated on 1/10/2020 and available in the patient's chart under the Media tab. Examination:  On today's exam she is on oxygen at rest and she does exhibit some weakness when I test her intrinsics on the left hand versus the right. She has subjective diminution into the ulnar nerve distribution and the dorsal ulnar aspect of the hand. Provocative testing for carpal tunnel syndrome is negative. Provocative testing for left cubital tunnel syndrome is mildly positive. There is no hypermobility of the ulnar nerve. Radiology:     X-rays obtained and reviewed in office:  Views    Location    Impression      No orders of the defined types were placed in this encounter. Impression:  Encounter Diagnosis   Name Primary?     Ulnar neuropathy at elbow of left upper extremity          Treatment Plan:  I discussed with the patient that she may have some other neurologic manifestations that did not appear on the EMG test, but I certainly cannot ignore the significant slowing of the ulnar nerve at the elbow along with her clinical weakness on exam.  I have offered her an outpatient left ulnar nerve decompression of the elbow followed by early postop therapy. She and her family understand that it may take many months for her maximum improvement to occur, and it is always possible that she may have some residual symptoms. We will also need to be on the look out for other atypical neurologic or proximal etiologies given the upper arm reports of spasming. We discussed the risks, benefits, limitations, alternatives, and postop recovery following the proposed procedure. We will begin the process of scheduling surgery if there are no other preoperative medical contraindications. Please note that this transcription was created using voice recognition software. Any errors are unintentional and may be due to voice recognition transcription.

## 2020-01-24 ENCOUNTER — OFFICE VISIT (OUTPATIENT)
Dept: PULMONOLOGY | Age: 65
End: 2020-01-24
Payer: COMMERCIAL

## 2020-01-24 VITALS
TEMPERATURE: 97.1 F | OXYGEN SATURATION: 98 % | WEIGHT: 203.6 LBS | DIASTOLIC BLOOD PRESSURE: 70 MMHG | HEIGHT: 63 IN | BODY MASS INDEX: 36.07 KG/M2 | HEART RATE: 81 BPM | SYSTOLIC BLOOD PRESSURE: 114 MMHG | RESPIRATION RATE: 16 BRPM

## 2020-01-24 PROCEDURE — 99214 OFFICE O/P EST MOD 30 MIN: CPT | Performed by: INTERNAL MEDICINE

## 2020-01-24 PROCEDURE — 3017F COLORECTAL CA SCREEN DOC REV: CPT | Performed by: INTERNAL MEDICINE

## 2020-01-24 PROCEDURE — 1036F TOBACCO NON-USER: CPT | Performed by: INTERNAL MEDICINE

## 2020-01-24 PROCEDURE — 1090F PRES/ABSN URINE INCON ASSESS: CPT | Performed by: INTERNAL MEDICINE

## 2020-01-24 PROCEDURE — 4040F PNEUMOC VAC/ADMIN/RCVD: CPT | Performed by: INTERNAL MEDICINE

## 2020-01-24 PROCEDURE — 1111F DSCHRG MED/CURRENT MED MERGE: CPT | Performed by: INTERNAL MEDICINE

## 2020-01-24 PROCEDURE — G8482 FLU IMMUNIZE ORDER/ADMIN: HCPCS | Performed by: INTERNAL MEDICINE

## 2020-01-24 PROCEDURE — G8417 CALC BMI ABV UP PARAM F/U: HCPCS | Performed by: INTERNAL MEDICINE

## 2020-01-24 PROCEDURE — 1123F ACP DISCUSS/DSCN MKR DOCD: CPT | Performed by: INTERNAL MEDICINE

## 2020-01-24 PROCEDURE — G8427 DOCREV CUR MEDS BY ELIG CLIN: HCPCS | Performed by: INTERNAL MEDICINE

## 2020-01-24 PROCEDURE — G8400 PT W/DXA NO RESULTS DOC: HCPCS | Performed by: INTERNAL MEDICINE

## 2020-01-24 NOTE — PROGRESS NOTES
[facility]    Outpatient Follow Up Note    Chief Complaint: Hospital follow-up and SOB  Consulting provider: Dr Vipul Maynard    Interval History: 75 y.o. female Admitted over Christmas for SOB and cough and fever. Neg BAL culture. Treated with abx. Steroids were maintained at 5mg. Currently back to baseline and her dyspnea and intermittent dry cough    Prior HPI:  admitted to the hospital for the ?4-5th time with similar presentations of SOB and Hypoxia and bilateral lung infiltrates. Bronchoscopy was again negative for infection. A right heart cath that was normal and then she had a subsequent high resolution CT scan while she was known to be euvolemic that showed persistent abnromalities. It seems to be most consistent with an ILD process. Possibly  or chronic HP or NSIP. Pt notes mold in a back room of her house. She stays out of this room. She is planning on having this remediated. Initial HPI:regarding abnormal PFTs, cough, congestion. The pt developed SOB and cough with green sputum, chest congestion, nasal congestion and sinus pressure since April. Her symptoms intermittently improved with symptomatic treatments and nasal spray. Her SOB was better with her inhaler. Overall while her symptoms have waxed and waned they have not fully resolved. The patient does not have SOB at rest but has LEE. Exercise tolerance on level ground is 1 block. Stairs. The patient has a  daily intermittent cough that is usually dry or sometimes productive of green sputum. The patient does wheeze intermittently.   Reports waking up feeling up gagging and having missed a breath.     The pt was seen by Dr Susan Estrella at [facility] and was thought to have some sinus disease contributing to her cough and she also had PFTs to work up a pulmonary cause that were inconclusive.     Never smoker  Environmental/chemical exposure: Asbestos exposure: no    Patient worked as home health aid    Current Outpatient NIGHTLY, Disp: 30 tablet, Rfl: 5    amitriptyline (ELAVIL) 25 MG tablet, Take 1 tablet by mouth nightly, Disp: 30 tablet, Rfl: 5    albuterol sulfate HFA (PROVENTIL HFA) 108 (90 Base) MCG/ACT inhaler, Inhale 2 puffs into the lungs every 6 hours as needed for Wheezing, Disp: 1 Inhaler, Rfl: 5    Azelastine-Fluticasone (DYMISTA) 137-50 MCG/ACT SUSP, 1 spray by Nasal route daily , Disp: , Rfl:     Esomeprazole Magnesium (NEXIUM 24HR PO), Take by mouth 2 times daily , Disp: , Rfl:     ipratropium-albuterol (DUONEB) 0.5-2.5 (3) MG/3ML SOLN nebulizer solution, Inhale 3 mLs into the lungs every 6 hours as needed for Shortness of Breath, Disp: 360 mL, Rfl: 0    Objective:   PHYSICAL EXAM: /70 (Site: Left Upper Arm, Position: Sitting, Cuff Size: Medium Adult)   Pulse 81   Temp 97.1 °F (36.2 °C) (Oral)   Resp 16   Ht 5' 3\" (1.6 m)   Wt 203 lb 9.6 oz (92.4 kg)   SpO2 98% Comment: on 2 liters  BMI 36.07 kg/m²   Constitutional:  No acute distress. Eyes: PERRL. Conjunctivae anicteric. ENT: Normal nose. Normal tongue. Oropharynx clear. Neck:  Trachea is midline. No thyroid tenderness. Respiratory: No accessory muscle usage. No wheezes. faint basilar rales. No Rhonchi. Cardiovascular: Normal S1S2. No digit clubbing. No digit cyanosis. No LE edema. Psychiatric: No anxiety or Agitation. Alert and Oriented to person, place and time. LABS:  Reviewed any pertinent new labs that are available. 2/6/19 Immunocap , IGE 9, CBC WNL except EOs 0.9:  Bronchoscopy 6/28/2019   left upper lobe, transbronchial biopsies:  - Fragments of benign bronchial wall. - Negative for granulomas or other significant inflammation.  - Negative for malignancy. A. Right Paratracheal Lymph Node, Transbronchial Fine Needle Aspirate:       -  No malignant cells identified. B. Subcarinal Lymph Node, Transbronchial Fine Needle Aspirate:       -  No malignant cells identified.   C. Lung, Left Upper Lobe, Bronchoalveolar Lavage:       -  No malignant cells identified. D. Lung, Bronchial Washings:       -  No malignant cells identified.     Subcarinal TB NA no phenotypically abnormal cells  AFB negative  Eosinophils 6%     CRP 56.9  AXEL negative  ANCA neg  HP panel neg    8/9/19 LHC/RHC: no CAD, PCWP 7, PA mean 19    8/21/19 Cryobiopsy at [facility]:  A.   Left lung, upper lobe, cryobiopsy:  - Alveolated lung parenchyma with organizing pneumonia pattern of injury. - Interstitial expansion by chronic inflammation and fibrosis with rare  ill-formed clusters of epithelioid histiocytes. - Negative for well-formed granulomas, giant cells, polarizable material,  atypia, and malignancy. B.   Left lung, lingula, cryobiopsy:  - Predominantly peribronchiolar lung parenchyma with interstitial fibrosis,  chronic inflammation and organizing pneumonia pattern of injury.  - Fragment of benign pleura. - Immunostains for CMV, HSV (I and II), special stains for fungal elements  (GMS) and bacterial organisms (Gram stain) are all negative. - Insitu hybridization for adenovirus is negative. - Negative for granulomas, giant cells, atypia, and malignancy. C.   Left lung, lower lobe, cryobiopsy:  - Alveolated lung parenchyma with organizing pneumonia pattern of injury. - Interstitial expansion by chronic inflammation and fibrosis with rare  ill-formed clusters of epithelioid histiocytes. - Negative for well-formed  granulomas, giant cells, atypia, and  malignancy. Comment: History of COPD and imaging findings of progressive diffuse ground glass opacities with traction bronchiectasis with differential diagnosis of organizing pneumonia is noted. The current biopsy specimen reveals a predominant pattern of organizing  pneumonia (upper and lower lobes > lingula).  Given the history of multiple  recurrent episodes of pneumonia requiring hostpitalization, the morphologic  findings best fit a resolving acute lung injury, with organizing pneumonia  and reactive pneumocyte proliferation. The differential diagnosis for this  pattern of injury  include infectious etiology (immunostain for HSV, CMV,  insitu hybridization for adenovirus and special stains for bacterial and  fungal organisms are all negative),residuum of prior acute  bronchopneumonia, toxic exposure, connective tissue disease or drug related  lung injury which when excluded, a cryptogenic organizing pneumonia may be  considered. However, in addition to an organizing pneumonia pattern of  injury (predominant pattern within this specimen), there is a component of  chronic interstitial fibroinflammatory process comprising lymphocytes,  plasma cells and histiocytes with occasional foci of interstitial poorly  formed aggregates of epithelioid histiocytes. These findings raise concern  of an underlying chronic fibro-inflammatory injury the differential  diagnosis includes chronic hypersensitivity pneumonitis or NSIP. A treated  vasculitis and treated eosinophilic pneumonia are in the differential and  cannot be entirely excluded. Final Diagnosis performed by  Susan Zhou MD    Second opinion on path from [facility] [place] canned into the chart    12/24/19 BAL neg    PFTs 9/24/18 at [facility] were inconclusive due to excessive variability in her results. There was not an obstructive ratio however.    3/1/19   FVC  (62%) FEV1 1.51 (64%) FEV1/FVC ratio 0.79   TLC  (66%)  RV  (%)   DLCO (43 corrects to 86%)  ERV 34%   MCT Negative    RHC done 8/2019 that showed a normal wedge pressure excluding pulmonary edema as a cause of her pulmonary disease    IMAGING:  I personally reviewed and interpreted the following imaging today in the office:   3/15/19 HRCT:   Mild mosaic attenuation throughout the lungs on expiratory images, compatible   with small airways disease or air trapping.       There are few subpleural irregular opacities throughout the lungs   bilaterally, in the apices, and in the lower lobes.  Changes appears similar

## 2020-01-27 LAB
FUNGUS (MYCOLOGY) CULTURE: NORMAL
FUNGUS STAIN: NORMAL

## 2020-02-03 RX ORDER — PREDNISONE 1 MG/1
5 TABLET ORAL DAILY
Qty: 30 TABLET | Refills: 1 | Status: SHIPPED | OUTPATIENT
Start: 2020-02-03 | End: 2020-05-22

## 2020-02-05 ENCOUNTER — TELEPHONE (OUTPATIENT)
Dept: FAMILY MEDICINE CLINIC | Age: 65
End: 2020-02-05

## 2020-02-07 ENCOUNTER — OFFICE VISIT (OUTPATIENT)
Dept: FAMILY MEDICINE CLINIC | Age: 65
End: 2020-02-07
Payer: MEDICARE

## 2020-02-07 VITALS
SYSTOLIC BLOOD PRESSURE: 128 MMHG | TEMPERATURE: 97.5 F | OXYGEN SATURATION: 97 % | DIASTOLIC BLOOD PRESSURE: 74 MMHG | WEIGHT: 202.4 LBS | BODY MASS INDEX: 35.85 KG/M2 | HEART RATE: 110 BPM

## 2020-02-07 PROCEDURE — 3017F COLORECTAL CA SCREEN DOC REV: CPT | Performed by: FAMILY MEDICINE

## 2020-02-07 PROCEDURE — G8417 CALC BMI ABV UP PARAM F/U: HCPCS | Performed by: FAMILY MEDICINE

## 2020-02-07 PROCEDURE — 4040F PNEUMOC VAC/ADMIN/RCVD: CPT | Performed by: FAMILY MEDICINE

## 2020-02-07 PROCEDURE — 96372 THER/PROPH/DIAG INJ SC/IM: CPT | Performed by: FAMILY MEDICINE

## 2020-02-07 PROCEDURE — 1036F TOBACCO NON-USER: CPT | Performed by: FAMILY MEDICINE

## 2020-02-07 PROCEDURE — G8400 PT W/DXA NO RESULTS DOC: HCPCS | Performed by: FAMILY MEDICINE

## 2020-02-07 PROCEDURE — 1123F ACP DISCUSS/DSCN MKR DOCD: CPT | Performed by: FAMILY MEDICINE

## 2020-02-07 PROCEDURE — 1090F PRES/ABSN URINE INCON ASSESS: CPT | Performed by: FAMILY MEDICINE

## 2020-02-07 PROCEDURE — G8482 FLU IMMUNIZE ORDER/ADMIN: HCPCS | Performed by: FAMILY MEDICINE

## 2020-02-07 PROCEDURE — G8428 CUR MEDS NOT DOCUMENT: HCPCS | Performed by: FAMILY MEDICINE

## 2020-02-07 PROCEDURE — 99213 OFFICE O/P EST LOW 20 MIN: CPT | Performed by: FAMILY MEDICINE

## 2020-02-07 RX ORDER — TRIAMCINOLONE ACETONIDE 40 MG/ML
40 INJECTION, SUSPENSION INTRA-ARTICULAR; INTRAMUSCULAR ONCE
Status: COMPLETED | OUTPATIENT
Start: 2020-02-07 | End: 2020-02-07

## 2020-02-07 RX ADMIN — TRIAMCINOLONE ACETONIDE 40 MG: 40 INJECTION, SUSPENSION INTRA-ARTICULAR; INTRAMUSCULAR at 14:27

## 2020-02-07 ASSESSMENT — PATIENT HEALTH QUESTIONNAIRE - PHQ9
2. FEELING DOWN, DEPRESSED OR HOPELESS: 0
1. LITTLE INTEREST OR PLEASURE IN DOING THINGS: 0
SUM OF ALL RESPONSES TO PHQ QUESTIONS 1-9: 0
SUM OF ALL RESPONSES TO PHQ QUESTIONS 1-9: 0
SUM OF ALL RESPONSES TO PHQ9 QUESTIONS 1 & 2: 0

## 2020-02-07 NOTE — PATIENT INSTRUCTIONS
Patient Education        Hives: Care Instructions  Your Care Instructions  Hives are raised, red, itchy patches of skin. They are also called wheals or welts. They usually have red borders and pale centers. Hives range in size from ¼ inch to 3 inches or more across. They may seem to move from place to place on the skin. Several hives may form a large area of raised, red skin. You can get hives after an insect sting, after taking medicine or eating certain foods, or because of infection or stress. Other causes include plants, things you breathe in, makeup, heat, cold, sunlight, and latex. You cannot spread hives to other people. Hives may last a few minutes or a few days, but a single spot may last less than 36 hours. Follow-up care is a key part of your treatment and safety. Be sure to make and go to all appointments, and call your doctor if you are having problems. It's also a good idea to know your test results and keep a list of the medicines you take. How can you care for yourself at home? · Avoid whatever you think may have caused your hives, such as a certain food or medicine. However, you may not know the cause. · Put a cool, wet towel on the area to relieve itching. · Take an over-the-counter antihistamine, such as diphenhydramine (Benadryl), cetirizine (Zyrtec), or loratadine (Claritin), to help stop the hives and calm the itching. Read and follow directions on the label. These medicines can make you feel sleepy. Do not drive while using them. · Stay away from strong soaps, detergents, and chemicals. These can make itching worse. When should you call for help? Call 911 anytime you think you may need emergency care. For example, call if:    · You have symptoms of a severe allergic reaction. These may include:  ? Sudden raised, red areas (hives) all over your body. ? Swelling of the throat, mouth, lips, or tongue. ? Trouble breathing. ? Passing out (losing consciousness).  Or you may feel very lightheaded or suddenly feel weak, confused, or restless.    Call your doctor now or seek immediate medical care if:    · You have symptoms of an allergic reaction, such as:  ? A rash or hives (raised, red areas on the skin). ? Itching. ? Swelling. ? Belly pain, nausea, or vomiting.     · You get hives after you start a new medicine.     · Hives have not gone away after 24 hours.    Watch closely for changes in your health, and be sure to contact your doctor if:    · You do not get better as expected. Where can you learn more? Go to https://Mov[employer]iceweb.[employer]. org and sign in to your [employer] account. Enter Z261 in the brick&mobile box to learn more about \"Hives: Care Instructions. \"     If you do not have an account, please click on the \"Sign Up Now\" link. Current as of: June 26, 2019  Content Version: 12.3  © 4777-6636 Healthwise, Incorporated. Care instructions adapted under license by [facility] ([facility]). If you have questions about a medical condition or this instruction, always ask your healthcare professional. [place] [address] any warranty or liability for your use of this information.

## 2020-02-07 NOTE — PROGRESS NOTES
Subjective:       Wendy Baltazar is a 77 y.o. female who presents for evaluation of rash involving the forearm and trunk. Rash started 2 weeks ago. Rash has worsened over that time. Lesions are pink in color, and raised in texture. Rash is pruritic. Associated symptoms: no associated symptoms. Patient has not had contacts with similar rash. No new soaps, creams, detergents, lotions, or perfumes have been used. She has used benadyl and hydroxyzine without relief of symptoms. Patient's medications, allergies, past medical, surgical, social and family histories were reviewed and updated as appropriate. Objective:      /74 (Site: Left Upper Arm, Position: Sitting, Cuff Size: Large Adult)   Pulse 110   Temp 97.5 °F (36.4 °C) (Temporal)   Wt 202 lb 6.4 oz (91.8 kg)   SpO2 97%   BMI 35.85 kg/m²   General:  appears older than stated age and alert, well appearing, and in no acute distress   Skin:   erythematous, urticarial rash noted on extremities and trunk, without bruising, bleeding or oozing. Assessment:      hives      Plan:      Medications: triamcinolone  Verbal patient instruction given.

## 2020-02-11 LAB
AFB CULTURE (MYCOBACTERIA): NORMAL
AFB SMEAR: NORMAL

## 2020-02-13 ENCOUNTER — TELEPHONE (OUTPATIENT)
Dept: FAMILY MEDICINE CLINIC | Age: 65
End: 2020-02-13

## 2020-02-17 ENCOUNTER — TELEPHONE (OUTPATIENT)
Dept: ORTHOPEDIC SURGERY | Age: 65
End: 2020-02-17

## 2020-02-17 ENCOUNTER — OFFICE VISIT (OUTPATIENT)
Dept: FAMILY MEDICINE CLINIC | Age: 65
End: 2020-02-17
Payer: MEDICARE

## 2020-02-17 VITALS
TEMPERATURE: 97.8 F | SYSTOLIC BLOOD PRESSURE: 142 MMHG | BODY MASS INDEX: 35.39 KG/M2 | HEART RATE: 89 BPM | OXYGEN SATURATION: 95 % | DIASTOLIC BLOOD PRESSURE: 80 MMHG | WEIGHT: 199.8 LBS

## 2020-02-17 LAB
A/G RATIO: 1.1 (CALC) (ref 0.8–2.6)
ALBUMIN SERPL-MCNC: 4.3 GM/DL (ref 3.5–5.2)
ALP BLD-CCNC: 112 U/L (ref 23–144)
ALT SERPL-CCNC: 26 U/L (ref 0–60)
AST SERPL-CCNC: 40 U/L (ref 0–55)
BILIRUB SERPL-MCNC: 0.3 MG/DL (ref 0–1.2)
BUN / CREAT RATIO: 14 (CALC) (ref 7–25)
BUN BLDV-MCNC: 11 MG/DL (ref 3–29)
CALCIUM SERPL-MCNC: 9.6 MG/DL (ref 8.5–10.5)
CHLORIDE BLD-SCNC: 99 MEQ/L (ref 96–110)
CHOLESTEROL, TOTAL: 182 MG/DL
CO2: 25 MEQ/L (ref 19–32)
CREAT SERPL-MCNC: 0.8 MG/DL
GFR SERPL CREATININE-BSD FRML MDRD: 77 ML/MIN/1.73M2
GLOBULIN: 3.9 GM/DL (CALC) (ref 1.9–3.6)
GLUCOSE BLD-MCNC: 157 MG/DL
HDLC SERPL-MCNC: 59 MG/DL
LDL CHOLESTEROL: 106 MG/DL (CALC)
POTASSIUM SERPL-SCNC: 4.1 MEQ/L (ref 3.4–5.3)
SODIUM BLD-SCNC: 139 MEQ/L (ref 135–148)
TOTAL PROTEIN: 8.2 GM/DL (ref 6–8.3)
TRIGL SERPL-MCNC: 83 MG/DL
TSH SERPL DL<=0.05 MIU/L-ACNC: 0.84 MICRO IU/ML (ref 0.4–4.5)
VLDLC SERPL CALC-MCNC: 17 MG/DL (CALC) (ref 4–38)

## 2020-02-17 PROCEDURE — G8427 DOCREV CUR MEDS BY ELIG CLIN: HCPCS | Performed by: FAMILY MEDICINE

## 2020-02-17 PROCEDURE — 99213 OFFICE O/P EST LOW 20 MIN: CPT | Performed by: FAMILY MEDICINE

## 2020-02-17 PROCEDURE — G8417 CALC BMI ABV UP PARAM F/U: HCPCS | Performed by: FAMILY MEDICINE

## 2020-02-17 PROCEDURE — G8482 FLU IMMUNIZE ORDER/ADMIN: HCPCS | Performed by: FAMILY MEDICINE

## 2020-02-17 PROCEDURE — 93000 ELECTROCARDIOGRAM COMPLETE: CPT | Performed by: FAMILY MEDICINE

## 2020-02-17 PROCEDURE — 2022F DILAT RTA XM EVC RTNOPTHY: CPT | Performed by: FAMILY MEDICINE

## 2020-02-17 PROCEDURE — 1090F PRES/ABSN URINE INCON ASSESS: CPT | Performed by: FAMILY MEDICINE

## 2020-02-17 NOTE — PROGRESS NOTES
Subjective:   Chief Complaint:     Liz Moreno is a 77 y.o. female who presents for a  physical examination. History of Present Illness:      Patient scheduled to have left ulnar nerve surgery and needs preop clearance. Past Medical History:   Diagnosis Date    Anxiety     COPD (chronic obstructive pulmonary disease) ([address]     Depression     Hyperlipidemia     Hypertension     Rash     Thyroid disease         Review of patient's past surgical history indicates:     Past Surgical History:   Procedure Laterality Date    BRONCHOSCOPY N/A 6/27/2019    EBUS WF W/ANES.  performed by Cedrick Cornelius MD at [address] [place] [address]  6/27/2019    BRONCHOSCOPY/TRANSBRONCHIAL NEEDLE BIOPSY performed by Cedrick Cornelius MD at [address] [place] [address]  6/27/2019    BRONCHOSCOPY/TRANSBRONCHIAL LUNG BIOPSY performed by Cedrick Cornelius MD at [address] [place] [address]  6/27/2019    BRONCHOSCOPY ALVEOLAR LAVAGE performed by Cedrick Cornelius MD at [address] [place] [address]  07/10/2019    BRONCHOSCOPY N/A 7/10/2019    BRONCHOSCOPY ALVEOLAR LAVAGE performed by Ta Dick MD at [address] [place] [address] Bilateral 08/06/2019    BRONCHOSCOPY N/A 8/6/2019    BRONCHOSCOPY ALVEOLAR LAVAGE performed by Mc Veloz MD at [address] [place] [address] N/A 12/24/2019    BRONCHOSCOPY ALVEOLAR LAVAGE performed by Clem Fan MD at [address] [place] [address], TOTAL ABDOMINAL      partial                                                   Current Outpatient Medications   Medication Sig Dispense Refill    predniSONE (DELTASONE) 5 MG tablet Take 1 tablet by mouth daily 30 tablet 1    albuterol (PROVENTIL) (2.5 MG/3ML) 0.083% nebulizer solution INHALE 3 MLS BY NEBULIZATION EVERY 6 HOURS AS NEEDED FOR WHEEZING 375 mL 5    furosemide (LASIX) 40 MG tablet TAKE 1 TABLET BY MOUTH EVERY DAY 30 tablet 1    KLOR-CON M20 20 MEQ extended release facility-administered medications for this visit. Allergies   Allergen Reactions    Penicillins Anaphylaxis     Tolerates Meropenem.  Cefuroxime      Tolerates Meropenem.  Doxycycline Hives    Imitrex [Sumatriptan] Other (See Comments)     Feels like pins and needles    Meperidine     Sulfa Antibiotics Hives    Keflex [Cephalexin] Itching and Rash     Tolerates Meropenem.  Tape [place] Tape] Rash       Social History     Tobacco Use    Smoking status: Never Smoker    Smokeless tobacco: Never Used   Substance Use Topics    Alcohol use: No    Drug use: No        Family History   Problem Relation Age of Onset    Diabetes Mother     High Blood Pressure Mother     Asthma Sister     Other Father         Review Of Systems    Skin: no abnormal pigmentation, rash, scaling, itching, masses, hair or nail changes  Eyes: negative  Ears/Nose/Throat: negative  Respiratory: negative  Cardiovascular: negative  Gastrointestinal: negative  Genitourinary: negative  Musculoskeletal: See HPI   neurologic: See HPI  Psychiatric: negative  Hematologic/Lymphatic/Immunologic: negative  Endocrine: negative       Objective:      BP (!) 142/80 (Site: Left Upper Arm, Position: Sitting, Cuff Size: Large Adult)   Pulse 89   Temp 97.8 °F (36.6 °C) (Temporal)   Wt 199 lb 12.8 oz (90.6 kg)   SpO2 95%   BMI 35.39 kg/m²   General appearance - healthy, alert, no distress, obese  Skin - Skin color, texture, turgor normal. No rashes or lesions. Head - Normocephalic. No masses, lesions, tenderness or abnormalities  Eyes - conjunctivae/corneas clear. PERRL, EOM's intact. Ears - External ears normal. Canals clear. TM's normal.  Nose/Sinuses - Nares normal. Septum midline. Mucosa normal. No drainage or sinus tenderness. Oropharynx - Lips, mucosa, and tongue normal. Teeth and gums normal.   Neck - Neck supple. No adenopathy. Thyroid symmetric, normal size,  Back - Back symmetric, no curvature.  ROM normal. No CVA tenderness. Lungs -clear to auscultation bilaterally, breathing comfortably  Heart - Regular rate and rhythm, with no rub, murmur or gallop noted. Abdomen - Abdomen soft, non-tender. BS normal. No masses, organomegaly  Extremities - Extremities normal. No deformities, edema, or skin discoloration  Musculoskeletal - Spine ROM normal. Muscular strength intact. Peripheral pulses - radial=4/4  Neuro - Gait normal. Reflexes normal and symmetric. Sensation grossly normal.  No focal weakness    EKG with normal sinus rhythm without ST or T wave changes. Assessment:         Diagnosis Orders   1. Pre-op evaluation  [place] ELECTROCARDIOGRAM, COMPLETE   2. Acquired hypothyroidism  TSH without Reflex   3. Type 2 diabetes mellitus without complication, without long-term current use of insulin (HCC)  Comprehensive Metabolic Panel   4. Essential hypertension  Comprehensive Metabolic Panel   5.  Screening for cardiovascular condition  Lipid Panel               Plan:        Medically optimized for surgery

## 2020-02-17 NOTE — PATIENT INSTRUCTIONS
meals using beans and peas. Add garbanzo or kidney beans to salads. Make burritos and tacos with mashed gtz beans or black beans. Where can you learn more? Go to [email or web address] and sign in to your [employer] account. Enter U554 in the [employer] box to learn more about \"DASH Diet: Care Instructions. \"     If you do not have an account, please click on the \"Sign Up Now\" link. Current as of: April 9, 2019  Content Version: 12.3  © 1702-0588 Healthwise, Incorporated. Care instructions adapted under license by [facility] ([facility]). If you have questions about a medical condition or this instruction, always ask your healthcare professional. [address] any warranty or liability for your use of this information.

## 2020-02-18 RX ORDER — HYDROXYZINE HYDROCHLORIDE 10 MG/1
TABLET, FILM COATED ORAL
Qty: 90 TABLET | Refills: 0 | Status: SHIPPED | OUTPATIENT
Start: 2020-02-18 | End: 2021-03-23 | Stop reason: SDUPTHER

## 2020-02-18 RX ORDER — LEVOTHYROXINE SODIUM 0.15 MG/1
150 TABLET ORAL DAILY
Qty: 30 TABLET | Refills: 3 | Status: SHIPPED | OUTPATIENT
Start: 2020-02-18 | End: 2020-05-22 | Stop reason: SDUPTHER

## 2020-02-18 RX ORDER — NADOLOL 20 MG/1
20 TABLET ORAL DAILY
Qty: 30 TABLET | Refills: 3 | Status: SHIPPED | OUTPATIENT
Start: 2020-02-18 | End: 2020-05-22 | Stop reason: SDUPTHER

## 2020-02-21 NOTE — PROGRESS NOTES
Tosha Lawler    Age 77 y.o.    female    1955    N [id number]    3/2/2020  Arrival Time_____________  OR Time____________[address]     Procedure(s):  LEFT ULNAR NERVE DECOMPRESSION AT THE ELBOW                      General    Surgeon(s):  Franklyn Sunshine, MD       Phone [phone] ([place])     [address]  Cell Work  _________________________________________________________________  _________________________________________________________________  _________________________________________________________________  _________________________________________________________________  _________________________________________________________________      PCP _____________________________ Phone_________________       H&P__________________Bringing    Chart            Epic  DOS     Called_______  EKG__________________Bringing    Chart            Epic  DOS     Called_______  LAB__________________ Bringing    Chart            Epic  DOS     Called_______  Cardiac Clearance_______Bringing    Chart            Epic      DOS       Called_______    Cardiologist________________________ Phone___________________________      ? Uatsdin concerns / Waiver on Chart            PAT Communications_____________  ? Pre-op Instructions Given [place]          ______________________________  ? Directions to Surgery Center                          ______________________________  ? Transportation Home_______________      _______________________________  ?  Crutches/Walker__________________        _______________________________      ________Pre-op Orders   _______Transcribed    _______Wt.  ________Pharmacy          _______SCD  ______VTE     ______Beta Blocker  ________Consent             ________TED Eva Baumgarten No

## 2020-02-24 ENCOUNTER — CARE COORDINATION (OUTPATIENT)
Dept: CARE COORDINATION | Age: 65
End: 2020-02-24

## 2020-02-24 NOTE — PROGRESS NOTES
Obstructive Sleep Apnea (WILFRIDO) Screening     Patient:  Beata Hoang    YOB: 1955      Medical Record #:  [id number]                     Date:  2/24/2020     1. Are you a loud and/or regular snorer? [x]  Yes       [] No    2. Have you been observed to gasp or stop breathing during sleep? []  Yes       [x] No    3. Do you feel tired or groggy upon awakening or do you awaken with a headache?           []  Yes       [x] No    4. Are you often tired or fatigued during the wake time hours? []  Yes       [x] No    5. Do you fall asleep sitting, reading, watching TV or driving? []  Yes       [x] No    6. Do you often have problems with memory or concentration? []  Yes       [x] No    **If patient's score is ? 3 they are considered high risk for WILFRIDO. Notify the anesthesiologist of the high risk and document in focus note. Note:  If the patient's BMI is more than 35 kg m¯² , has neck circumference > 40 cm, and/or high blood pressure the risk is greater (© American Sleep Apnea Association, 2006).

## 2020-02-28 ENCOUNTER — ANESTHESIA EVENT (OUTPATIENT)
Dept: OPERATING ROOM | Age: 65
End: 2020-02-28
Payer: MEDICARE

## 2020-03-01 NOTE — H&P
I have reviewed the H&P and examined the patient and find no relevant changes. I have reviewed with the patient and/or family the risks, benefits, and alternatives to the procedure(s). Past Medical History:   Diagnosis Date    A-fib [facility])     Anxiety     Cryptogenic organizing pneumonia ([facility]     Depression     [facility]     Hyperlipidemia     Hypertension     On home oxygen therapy     uses O2 3L prn    Rash     Thyroid disease     hypothyroidism     Past Surgical History:   Procedure Laterality Date    BRONCHOSCOPY N/A 6/27/2019    EBUS WF W/ANES. performed by Alonso Campos MD at [address] [place] [address]  6/27/2019    BRONCHOSCOPY/TRANSBRONCHIAL NEEDLE BIOPSY performed by Alonso Campos MD at [address] [place] [address]  6/27/2019    BRONCHOSCOPY/TRANSBRONCHIAL LUNG BIOPSY performed by Alonso Campos MD at [address] [place] [address]  6/27/2019    BRONCHOSCOPY ALVEOLAR LAVAGE performed by Alonso Campos MD at [address] [place] [address]  07/10/2019    BRONCHOSCOPY N/A 7/10/2019    BRONCHOSCOPY ALVEOLAR LAVAGE performed by Gadiel Álvarez MD at [address] [place] [address] Bilateral 08/06/2019    BRONCHOSCOPY N/A 8/6/2019    BRONCHOSCOPY ALVEOLAR LAVAGE performed by Susy Gould MD at [address] [place] [address] N/A 12/24/2019    BRONCHOSCOPY ALVEOLAR LAVAGE performed by Tiana Ambrosio MD at [address] [place] [address], TOTAL ABDOMINAL      partial     No current facility-administered medications on file prior to encounter.       Current Outpatient Medications on File Prior to Encounter   Medication Sig Dispense Refill    FLUTICASONE FUROATE IN Inhale into the lungs as needed      Multiple Vitamins-Minerals (MULTIVITAMIN ADULT PO) Take by mouth daily      predniSONE (DELTASONE) 5 MG tablet Take 1 tablet by mouth daily 30 tablet 1    albuterol (PROVENTIL) (2.5 MG/3ML) 0.083% nebulizer solution INHALE 3 MLS BY NEBULIZATION EVERY 6 HOURS AS NEEDED FOR WHEEZING 375 mL 5    empagliflozin (JARDIANCE) 25 MG tablet Take 1 tablet by mouth daily 90 tablet 1    LORazepam (ATIVAN) 0.5 MG tablet Take 1 tablet by mouth every 6 hours as needed for Anxiety for up to 180 days. 30 tablet 5    mirtazapine (REMERON) 30 MG tablet TAKE 1 TABLET BY MOUTH EVERY DAY AT NIGHT 30 tablet 2    SITagliptin (JANUVIA) 100 MG tablet Take 1 tablet by mouth daily 90 tablet 1    dapagliflozin (FARXIGA) 10 MG tablet Take 1 tablet by mouth every morning 90 tablet 1    blood glucose test strips (ONETOUCH VERIO) strip 1 each by In Vitro route 2 times daily As needed. 100 each 5    promethazine-dextromethorphan (PROMETHAZINE-DM) 6.25-15 MG/5ML syrup Take 5 mLs by mouth 4 times daily as needed for Cough 240 mL 1    benzonatate (TESSALON) 200 MG capsule TAKE 1 CAPSULE BY MOUTH THREE TIMES A DAY AS NEEDED FOR COUGH 90 capsule 1    blood glucose test strips (ONETOUCH VERIO) strip 1 each by In Vitro route daily As needed.  100 each 3    OXYGEN Inhale 3 L into the lungs      sertraline (ZOLOFT) 100 MG tablet Take 2 tablets by mouth daily 60 tablet 5    traZODone (DESYREL) 50 MG tablet TAKE 1 TABLET BY MOUTH NIGHTLY 30 tablet 5    amitriptyline (ELAVIL) 25 MG tablet Take 1 tablet by mouth nightly 30 tablet 5    albuterol sulfate HFA (PROVENTIL HFA) 108 (90 Base) MCG/ACT inhaler Inhale 2 puffs into the lungs every 6 hours as needed for Wheezing 1 Inhaler 5    Esomeprazole Magnesium (NEXIUM 24HR PO) Take by mouth daily            [address]

## 2020-03-01 NOTE — OP NOTE
[address] [facility]  Procedure Note  [facility]      Brian Chavez  03/02/2020    Pre-operative Diagnosis:Left Ulnar Neuropathy at Elbow    Post-operative Diagnosis: Same    Procedure: Left Ulnar Nerve Decompression at Elbow    Surgeon: Genoveva Rodriguez    Anesthesia:  General    Complications:  None    EBL: less than 20cc    INDICATIONS FOR PROCEDURE: The patient has clinical and electrodiagnostic evidence of symptomatic ulnar neuropathy at the elbow and has failed appropriate conservative management. The patient understands the relevant risks, benefits, limitations, critical nature of ulnar nerve injuries, chance of recurrence or incomplete resolution, and the healing process of the procedure. PROCEDURE: The patient was brought to the operating room and anesthetized with general anesthesia. The identified and marked extremity was then prepped and draped in a standard fashion. A well-padded tourniquet was applied to the upper arm. The arm was exsanguinated and the tourniquet elevated to 250 mmHg. A standard posteromedial incision was made at the elbow. Dissection carried down through skin and subcutaneous tissue with careful attention paid to hemostasis and protection of crossing cutaneous nerve branches. The ulnar nerve was identified just proximal to the cubital tunnel and decompressed proximally. Careful decompression under direct visualization was carried through the cubital tunnel and the flexor-pronator origin. The ulnar nerve was noted to have experienced compression and erythema in the region of the cubital tunnel. Motor branches were carefully protected. The arm was then taken through a gentle range of motion. The nerve was intact and with a corresponding area of moderate erythema. The tourniquet was released and hemostasis achieved with bipolar electrocautery. The wound was irrigated with sterile saline.   Skin was closed with 4-0

## 2020-03-02 ENCOUNTER — ANESTHESIA (OUTPATIENT)
Dept: OPERATING ROOM | Age: 65
End: 2020-03-02
Payer: MEDICARE

## 2020-03-02 ENCOUNTER — HOSPITAL ENCOUNTER (OUTPATIENT)
Age: 65
Setting detail: OUTPATIENT SURGERY
Discharge: HOME | End: 2020-03-02
Attending: ORTHOPAEDIC SURGERY | Admitting: ORTHOPAEDIC SURGERY
Payer: MEDICARE

## 2020-03-02 VITALS
DIASTOLIC BLOOD PRESSURE: 52 MMHG | RESPIRATION RATE: 14 BRPM | OXYGEN SATURATION: 99 % | SYSTOLIC BLOOD PRESSURE: 93 MMHG

## 2020-03-02 VITALS
BODY MASS INDEX: 33.46 KG/M2 | SYSTOLIC BLOOD PRESSURE: 153 MMHG | DIASTOLIC BLOOD PRESSURE: 57 MMHG | HEIGHT: 64 IN | OXYGEN SATURATION: 96 % | WEIGHT: 196 LBS | TEMPERATURE: 96.8 F | RESPIRATION RATE: 16 BRPM | HEART RATE: 85 BPM

## 2020-03-02 LAB
GLUCOSE BLD-MCNC: 134 MG/DL (ref 70–99)
GLUCOSE BLD-MCNC: 145 MG/DL (ref 70–99)
PERFORMED ON: ABNORMAL
PERFORMED ON: ABNORMAL

## 2020-03-02 PROCEDURE — 2500000003 HC RX 250 WO HCPCS: Performed by: ORTHOPAEDIC SURGERY

## 2020-03-02 PROCEDURE — 2500000003 HC RX 250 WO HCPCS: Performed by: ANESTHESIOLOGY

## 2020-03-02 PROCEDURE — 7100000010 HC PHASE II RECOVERY - FIRST 15 MIN: Performed by: ORTHOPAEDIC SURGERY

## 2020-03-02 PROCEDURE — 2580000003 HC RX 258: Performed by: ORTHOPAEDIC SURGERY

## 2020-03-02 PROCEDURE — 2500000003 HC RX 250 WO HCPCS: Performed by: NURSE ANESTHETIST, CERTIFIED REGISTERED

## 2020-03-02 PROCEDURE — 3600000003 HC SURGERY LEVEL 3 BASE: Performed by: ORTHOPAEDIC SURGERY

## 2020-03-02 PROCEDURE — 7100000001 HC PACU RECOVERY - ADDTL 15 MIN: Performed by: ORTHOPAEDIC SURGERY

## 2020-03-02 PROCEDURE — 6360000002 HC RX W HCPCS: Performed by: NURSE ANESTHETIST, CERTIFIED REGISTERED

## 2020-03-02 PROCEDURE — 7100000000 HC PACU RECOVERY - FIRST 15 MIN: Performed by: ORTHOPAEDIC SURGERY

## 2020-03-02 PROCEDURE — 3700000001 HC ADD 15 MINUTES (ANESTHESIA): Performed by: ORTHOPAEDIC SURGERY

## 2020-03-02 PROCEDURE — 2580000003 HC RX 258: Performed by: ANESTHESIOLOGY

## 2020-03-02 PROCEDURE — 2709999900 HC NON-CHARGEABLE SUPPLY: Performed by: ORTHOPAEDIC SURGERY

## 2020-03-02 PROCEDURE — 3600000013 HC SURGERY LEVEL 3 ADDTL 15MIN: Performed by: ORTHOPAEDIC SURGERY

## 2020-03-02 PROCEDURE — 7100000011 HC PHASE II RECOVERY - ADDTL 15 MIN: Performed by: ORTHOPAEDIC SURGERY

## 2020-03-02 PROCEDURE — 3700000000 HC ANESTHESIA ATTENDED CARE: Performed by: ORTHOPAEDIC SURGERY

## 2020-03-02 RX ORDER — DEXAMETHASONE SODIUM PHOSPHATE 4 MG/ML
INJECTION, SOLUTION INTRA-ARTICULAR; INTRALESIONAL; INTRAMUSCULAR; INTRAVENOUS; SOFT TISSUE PRN
Status: DISCONTINUED | OUTPATIENT
Start: 2020-03-02 | End: 2020-03-02 | Stop reason: SDUPTHER

## 2020-03-02 RX ORDER — ONDANSETRON 4 MG/1
4 TABLET, FILM COATED ORAL EVERY 8 HOURS PRN
Qty: 10 TABLET | Refills: 1 | Status: SHIPPED | OUTPATIENT
Start: 2020-03-02 | End: 2020-05-22

## 2020-03-02 RX ORDER — LIDOCAINE HYDROCHLORIDE 10 MG/ML
INJECTION, SOLUTION INFILTRATION; PERINEURAL PRN
Status: DISCONTINUED | OUTPATIENT
Start: 2020-03-02 | End: 2020-03-02 | Stop reason: SDUPTHER

## 2020-03-02 RX ORDER — SODIUM CHLORIDE 0.9 % (FLUSH) 0.9 %
10 SYRINGE (ML) INJECTION PRN
Status: DISCONTINUED | OUTPATIENT
Start: 2020-03-02 | End: 2020-03-02 | Stop reason: HOSPADM

## 2020-03-02 RX ORDER — SODIUM CHLORIDE 0.9 % (FLUSH) 0.9 %
10 SYRINGE (ML) INJECTION EVERY 12 HOURS SCHEDULED
Status: DISCONTINUED | OUTPATIENT
Start: 2020-03-02 | End: 2020-03-02 | Stop reason: HOSPADM

## 2020-03-02 RX ORDER — ONDANSETRON 2 MG/ML
INJECTION INTRAMUSCULAR; INTRAVENOUS PRN
Status: DISCONTINUED | OUTPATIENT
Start: 2020-03-02 | End: 2020-03-02 | Stop reason: SDUPTHER

## 2020-03-02 RX ORDER — SODIUM CHLORIDE, SODIUM LACTATE, POTASSIUM CHLORIDE, CALCIUM CHLORIDE 600; 310; 30; 20 MG/100ML; MG/100ML; MG/100ML; MG/100ML
INJECTION, SOLUTION INTRAVENOUS CONTINUOUS
Status: DISCONTINUED | OUTPATIENT
Start: 2020-03-02 | End: 2020-03-02 | Stop reason: HOSPADM

## 2020-03-02 RX ORDER — PROMETHAZINE HYDROCHLORIDE 25 MG/ML
6.25 INJECTION, SOLUTION INTRAMUSCULAR; INTRAVENOUS
Status: DISCONTINUED | OUTPATIENT
Start: 2020-03-02 | End: 2020-03-02 | Stop reason: HOSPADM

## 2020-03-02 RX ORDER — HYDRALAZINE HYDROCHLORIDE 20 MG/ML
5 INJECTION INTRAMUSCULAR; INTRAVENOUS EVERY 10 MIN PRN
Status: DISCONTINUED | OUTPATIENT
Start: 2020-03-02 | End: 2020-03-02 | Stop reason: HOSPADM

## 2020-03-02 RX ORDER — MAGNESIUM HYDROXIDE 1200 MG/15ML
LIQUID ORAL CONTINUOUS PRN
Status: COMPLETED | OUTPATIENT
Start: 2020-03-02 | End: 2020-03-02

## 2020-03-02 RX ORDER — OXYCODONE HYDROCHLORIDE AND ACETAMINOPHEN 5; 325 MG/1; MG/1
1 TABLET ORAL PRN
Status: DISCONTINUED | OUTPATIENT
Start: 2020-03-02 | End: 2020-03-02 | Stop reason: HOSPADM

## 2020-03-02 RX ORDER — LABETALOL HYDROCHLORIDE 5 MG/ML
5 INJECTION, SOLUTION INTRAVENOUS EVERY 10 MIN PRN
Status: DISCONTINUED | OUTPATIENT
Start: 2020-03-02 | End: 2020-03-02 | Stop reason: HOSPADM

## 2020-03-02 RX ORDER — MORPHINE SULFATE 2 MG/ML
2 INJECTION, SOLUTION INTRAMUSCULAR; INTRAVENOUS EVERY 5 MIN PRN
Status: DISCONTINUED | OUTPATIENT
Start: 2020-03-02 | End: 2020-03-02 | Stop reason: HOSPADM

## 2020-03-02 RX ORDER — ONDANSETRON 2 MG/ML
4 INJECTION INTRAMUSCULAR; INTRAVENOUS PRN
Status: DISCONTINUED | OUTPATIENT
Start: 2020-03-02 | End: 2020-03-02 | Stop reason: HOSPADM

## 2020-03-02 RX ORDER — BUPIVACAINE HYDROCHLORIDE 5 MG/ML
INJECTION, SOLUTION EPIDURAL; INTRACAUDAL PRN
Status: DISCONTINUED | OUTPATIENT
Start: 2020-03-02 | End: 2020-03-02 | Stop reason: ALTCHOICE

## 2020-03-02 RX ORDER — FENTANYL CITRATE 50 UG/ML
INJECTION, SOLUTION INTRAMUSCULAR; INTRAVENOUS PRN
Status: DISCONTINUED | OUTPATIENT
Start: 2020-03-02 | End: 2020-03-02 | Stop reason: SDUPTHER

## 2020-03-02 RX ORDER — OXYCODONE HYDROCHLORIDE AND ACETAMINOPHEN 5; 325 MG/1; MG/1
2 TABLET ORAL PRN
Status: DISCONTINUED | OUTPATIENT
Start: 2020-03-02 | End: 2020-03-02 | Stop reason: HOSPADM

## 2020-03-02 RX ORDER — IBUPROFEN 600 MG/1
600 TABLET ORAL EVERY 6 HOURS PRN
Qty: 60 TABLET | Refills: 1 | Status: SHIPPED | OUTPATIENT
Start: 2020-03-02 | End: 2020-11-23

## 2020-03-02 RX ORDER — OXYCODONE HYDROCHLORIDE AND ACETAMINOPHEN 5; 325 MG/1; MG/1
1 TABLET ORAL EVERY 6 HOURS PRN
Qty: 20 TABLET | Refills: 0 | Status: SHIPPED | OUTPATIENT
Start: 2020-03-02 | End: 2020-03-09

## 2020-03-02 RX ORDER — MORPHINE SULFATE 2 MG/ML
1 INJECTION, SOLUTION INTRAMUSCULAR; INTRAVENOUS EVERY 5 MIN PRN
Status: DISCONTINUED | OUTPATIENT
Start: 2020-03-02 | End: 2020-03-02 | Stop reason: HOSPADM

## 2020-03-02 RX ORDER — DIPHENHYDRAMINE HYDROCHLORIDE 50 MG/ML
12.5 INJECTION INTRAMUSCULAR; INTRAVENOUS
Status: DISCONTINUED | OUTPATIENT
Start: 2020-03-02 | End: 2020-03-02 | Stop reason: HOSPADM

## 2020-03-02 RX ORDER — PROPOFOL 10 MG/ML
INJECTION, EMULSION INTRAVENOUS PRN
Status: DISCONTINUED | OUTPATIENT
Start: 2020-03-02 | End: 2020-03-02 | Stop reason: SDUPTHER

## 2020-03-02 RX ADMIN — LIDOCAINE HYDROCHLORIDE 40 MG: 10 INJECTION, SOLUTION INFILTRATION; PERINEURAL at 09:11

## 2020-03-02 RX ADMIN — ONDANSETRON 4 MG: 2 INJECTION INTRAMUSCULAR; INTRAVENOUS at 09:22

## 2020-03-02 RX ADMIN — PROPOFOL 300 MG: 10 INJECTION, EMULSION INTRAVENOUS at 09:11

## 2020-03-02 RX ADMIN — Medication 900 MG: at 09:16

## 2020-03-02 RX ADMIN — DEXAMETHASONE SODIUM PHOSPHATE 4 MG: 4 INJECTION, SOLUTION INTRAMUSCULAR; INTRAVENOUS at 09:22

## 2020-03-02 RX ADMIN — SODIUM CHLORIDE, POTASSIUM CHLORIDE, SODIUM LACTATE AND CALCIUM CHLORIDE: 600; 310; 30; 20 INJECTION, SOLUTION INTRAVENOUS at 08:34

## 2020-03-02 RX ADMIN — FENTANYL CITRATE 50 MCG: 50 INJECTION INTRAMUSCULAR; INTRAVENOUS at 09:22

## 2020-03-02 RX ADMIN — FAMOTIDINE 20 MG: 10 INJECTION, SOLUTION INTRAVENOUS at 08:29

## 2020-03-02 ASSESSMENT — PULMONARY FUNCTION TESTS
PIF_VALUE: 11
PIF_VALUE: 15
PIF_VALUE: 11
PIF_VALUE: 13
PIF_VALUE: 15
PIF_VALUE: 11
PIF_VALUE: 1
PIF_VALUE: 10
PIF_VALUE: 1
PIF_VALUE: 1
PIF_VALUE: 12
PIF_VALUE: 1
PIF_VALUE: 11
PIF_VALUE: 10
PIF_VALUE: 11
PIF_VALUE: 11
PIF_VALUE: 0
PIF_VALUE: 0
PIF_VALUE: 11
PIF_VALUE: 3
PIF_VALUE: 11
PIF_VALUE: 10
PIF_VALUE: 11
PIF_VALUE: 1
PIF_VALUE: 11
PIF_VALUE: 4
PIF_VALUE: 9
PIF_VALUE: 11
PIF_VALUE: 4
PIF_VALUE: 11

## 2020-03-02 ASSESSMENT — PAIN - FUNCTIONAL ASSESSMENT: PAIN_FUNCTIONAL_ASSESSMENT: 0-10

## 2020-03-02 ASSESSMENT — ENCOUNTER SYMPTOMS: SHORTNESS OF BREATH: 1

## 2020-03-02 NOTE — PROGRESS NOTES
POCT      Blood Glucose:144      (Normal Range 70-99)    PT:      (Normal Range 9.8 - 13)    INR:      (Normal Range 0.86-1.14)

## 2020-03-02 NOTE — ANESTHESIA POSTPROCEDURE EVALUATION
Department of Anesthesiology  Postprocedure Note    Patient: Danitza Hall  MRN: [id number]  [place]: 1955  Date of evaluation: 3/2/2020  Time:  12:17 PM     Procedure Summary     Date:  03/02/20 Room / Location:  [address] [place] [address]    Anesthesia Start:  4902 Anesthesia Stop:  0332    Procedure:  LEFT ULNAR NERVE DECOMPRESSION AT THE ELBOW (Left Arm Lower) Diagnosis:       Cubital tunnel syndrome, left      (Cubital tunnel syndrome, left [G56.22])    Surgeon:  Bryan Vines MD Responsible Provider:  José Fung MD    Anesthesia Type:  general ASA Status:  3          Anesthesia Type: general    Liz Phase I: Liz Score: 5    Liz Phase II: Liz Score: 9    Last vitals: Reviewed and per EMR flowsheets.        Anesthesia Post Evaluation    Comments: Postoperative Anesthesia Note    Name:    Danitza Hall  MRN:      [id number]    Patient Vitals in the past 12 hrs:  03/02/20 1019, BP:(!) 153/57, Pulse:85, Resp:16, SpO2:96 %  03/02/20 1007, BP:(!) 152/83, Temp:96.8 °F (36 °C), Pulse:87, Resp:16, SpO2:97 %  03/02/20 1005, BP:(!) 149/77, Pulse:89, Resp:16, SpO2:96 %  03/02/20 0958, BP:135/75, Pulse:83, Resp:16, SpO2:97 %  03/02/20 0953, BP:(!) 106/57, Pulse:73, Resp:16, SpO2:93 %  03/02/20 0948, BP:(!) 123/58, Temp:96.8 °F (36 °C), Pulse:74, Resp:16, SpO2:93 %  03/02/20 0800, BP:138/78, Temp:98.2 °F (36.8 °C), Temp src:Temporal, Pulse:81, Resp:22, SpO2:94 %, Height:5' 3.5\" (1.613 m), Weight:196 lb (88.9 kg)     LABS:    CBC  Lab Results       Component                Value               Date/Time                  WBC                      5.8                 12/25/2019 05:06 AM        HGB                      9.3 (L)             12/25/2019 05:06 AM        HCT                      30.4 (L)            12/25/2019 05:06 AM        PLT                      134 (L)             12/25/2019 05:06 AM   RENAL  Lab Results       Component                Value               Date/Time NA                       139                 02/17/2020 12:00 AM        K                        4.1                 02/17/2020 12:00 AM        K                        3.3 (L)             12/24/2019 05:17 AM        CL                       99                  02/17/2020 12:00 AM        CO2                      25                  02/17/2020 12:00 AM        BUN                      11                  02/17/2020 12:00 AM        CREATININE               0.8                 02/17/2020 12:00 AM        GLUCOSE                  157 (H)             02/17/2020 12:00 AM   COAGS  Lab Results       Component                Value               Date/Time                  PROTIME                  13.3 (H)            07/10/2019 10:00 AM        INR                      1.17 (H)            07/10/2019 10:00 AM        APTT                     25.7                06/16/2014 12:40 PM     Intake & Output:  @56XGLY@    Nausea & Vomiting:  No    Level of Consciousness:  Awake    Pain Assessment:  Adequate analgesia    Anesthesia Complications:  No apparent anesthetic complications    SUMMARY      Vital signs stable  OK to discharge from Stage I post anesthesia care.   Care transferred from Anesthesiology department on discharge from perioperative area

## 2020-03-02 NOTE — ANESTHESIA PRE PROCEDURE
Department of Anesthesiology  Preprocedure Note       Name:  Taylor Berg   Age:  77 y.o.  :  1955                                          MRN:  [id number]         Date:  3/2/2020      Surgeon: Christa Boast):  Jhonny Coyle MD    Procedure: LEFT ULNAR NERVE DECOMPRESSION AT THE ELBOW (Left Arm Lower)    Medications prior to admission:   Prior to Admission medications    Medication Sig Start Date End Date Taking? Authorizing Provider   FLUTICASONE FUROATE IN Inhale into the lungs as needed   Yes Historical Provider, MD   Multiple Vitamins-Minerals (MULTIVITAMIN ADULT PO) Take by mouth daily   Yes Historical Provider, MD   KLOR-CON M20 20 MEQ extended release tablet TAKE 1 TABLET BY MOUTH EVERY DAY 20   Wiley Lopez MD   furosemide (LASIX) 40 MG tablet TAKE 1 TABLET BY MOUTH EVERY DAY 20   Wiley Lopez MD   hydrOXYzine (ATARAX) 10 MG tablet TAKE 1 TO 3 TABLETS BY MOUTH 3 TIMES DAILY AS NEEDED FOR ITCHING 20   Wiley Lopez MD   nadolol (CORGARD) 20 MG tablet Take 1 tablet by mouth daily 20   Wiley Lopez MD   levothyroxine (SYNTHROID) 150 MCG tablet Take 1 tablet by mouth Daily 20   Wiley Lopez MD   predniSONE (DELTASONE) 5 MG tablet Take 1 tablet by mouth daily 2/3/20   Munira Casiano MD   albuterol (PROVENTIL) (2.5 MG/3ML) 0.083% nebulizer solution INHALE 3 MLS BY NEBULIZATION EVERY 6 HOURS AS NEEDED FOR WHEEZING 20   Kailyn Gomez MD   empagliflozin (JARDIANCE) 25 MG tablet Take 1 tablet by mouth daily 1/3/20   Wiley Lopez MD   LORazepam (ATIVAN) 0.5 MG tablet Take 1 tablet by mouth every 6 hours as needed for Anxiety for up to 180 days.  19  Wiley Lopez MD   mirtazapine (REMERON) 30 MG tablet TAKE 1 TABLET BY MOUTH EVERY DAY AT NIGHT 19   Wiley Lopez MD   SITagliptin [facility]) 100 MG tablet Take 1 tablet by mouth daily 11/15/19   Wiley Lopez MD   dapagliflozin (FARXIGA) 10 MG tablet Take 1 tablet by mouth every morning 11/15/19   Asya Bradley MD   blood glucose test strips [facility] VERIO) strip 1 each by In Vitro route 2 times daily As needed. 11/15/19   Asya Bradley MD   promethazine-dextromethorphan (PROMETHAZINE-DM) 6.25-15 MG/5ML syrup Take 5 mLs by mouth 4 times daily as needed for Cough 11/15/19   Asya Bradley MD   benzonatate (TESSALON) 200 MG capsule TAKE 1 CAPSULE BY MOUTH THREE TIMES A DAY AS NEEDED FOR COUGH 11/14/19   Sammi Cool MD   blood glucose test strips (ONETOUCH VERIO) strip 1 each by In Vitro route daily As needed. 10/15/19   Asya Bradley MD   OXYGEN Inhale 3 L into the lungs 7/14/19   Historical Provider, MD   sertraline (ZOLOFT) 100 MG tablet Take 2 tablets by mouth daily 9/9/19   Asya Bradley MD   traZODone (DESYREL) 50 MG tablet TAKE 1 TABLET BY MOUTH NIGHTLY 9/9/19   Asya MD Kirk   amitriptyline (ELAVIL) 25 MG tablet Take 1 tablet by mouth nightly 9/9/19   Asya Bradley MD   albuterol sulfate HFA (PROVENTIL HFA) 108 (90 Base) MCG/ACT inhaler Inhale 2 puffs into the lungs every 6 hours as needed for Wheezing 7/17/19   Yessica Harding MD   Esomeprazole Magnesium (NEXIUM 24HR PO) Take by mouth daily     Historical Provider, MD       Current medications:    No current facility-administered medications for this encounter.       Current Outpatient Medications   Medication Sig Dispense Refill    FLUTICASONE FUROATE IN Inhale into the lungs as needed      Multiple Vitamins-Minerals (MULTIVITAMIN ADULT PO) Take by mouth daily      KLOR-CON M20 20 MEQ extended release tablet TAKE 1 TABLET BY MOUTH EVERY DAY 30 tablet 5    furosemide (LASIX) 40 MG tablet TAKE 1 TABLET BY MOUTH EVERY DAY 30 tablet 5    hydrOXYzine (ATARAX) 10 MG tablet TAKE 1 TO 3 TABLETS BY MOUTH 3 TIMES DAILY AS NEEDED FOR ITCHING 90 tablet 0    nadolol (CORGARD) 20 MG tablet Take 1 tablet by mouth daily 30 tablet 3    levothyroxine (SYNTHROID) 150 MCG tablet Take 1 tablet by mouth Daily 30 tablet 3    predniSONE uses O2 3L prn    Rash     Thyroid disease     hypothyroidism       Past Surgical History:        Procedure Laterality Date    BRONCHOSCOPY N/A 6/27/2019    EBUS WF W/ANES. performed by Goyo Lobo MD at [address]  6/27/2019    BRONCHOSCOPY/TRANSBRONCHIAL NEEDLE BIOPSY performed by Goyo Lobo MD at [address]  6/27/2019    BRONCHOSCOPY/TRANSBRONCHIAL LUNG BIOPSY performed by Goyo Lobo MD at [address]  6/27/2019    BRONCHOSCOPY ALVEOLAR LAVAGE performed by Goyo Lobo MD at [address]  07/10/2019    BRONCHOSCOPY N/A 7/10/2019    BRONCHOSCOPY ALVEOLAR LAVAGE performed by Jayna Lanier MD at [address] Bilateral 08/06/2019    BRONCHOSCOPY N/A 8/6/2019    BRONCHOSCOPY ALVEOLAR LAVAGE performed by Rony Masterson MD at [address] N/A 12/24/2019    BRONCHOSCOPY ALVEOLAR LAVAGE performed by Teresa Garcia MD at [address] [place] [address], TOTAL ABDOMINAL      partial       Social History:    Social History     Tobacco Use    Smoking status: Never Smoker    Smokeless tobacco: Never Used   Substance Use Topics    Alcohol use: No                                Counseling given: Not Answered      Vital Signs (Current):   Vitals:    02/24/20 1128   Weight: 196 lb (88.9 kg)   Height: 5' 3.5\" (1.613 m)                                              BP Readings from Last 3 Encounters:   02/17/20 (!) 142/80   02/07/20 128/74   01/24/20 114/70       NPO Status:                                                                                 BMI:   Wt Readings from Last 3 Encounters:   02/17/20 199 lb 12.8 oz (90.6 kg)   02/07/20 202 lb 6.4 oz (91.8 kg)   01/24/20 203 lb 9.6 oz (92.4 kg)     Body mass index is 34.18 kg/m².     CBC:   Lab Results   Component Value Date    WBC 5.8 12/25/2019    RBC 4.00 12/25/2019    HGB 9.3 12/25/2019 HCT 30.4 12/25/2019    MCV 75.9 12/25/2019    RDW 17.6 12/25/2019     12/25/2019       CMP:   Lab Results   Component Value Date     02/17/2020    K 4.1 02/17/2020    K 3.3 12/24/2019    CL 99 02/17/2020    CO2 25 02/17/2020    BUN 11 02/17/2020    CREATININE 0.8 02/17/2020    GFRAA >60 12/26/2019    AGRATIO 1.1 02/17/2020    LABGLOM 77 02/17/2020    GLUCOSE 157 02/17/2020    PROT 8.2 02/17/2020    CALCIUM 9.6 02/17/2020    BILITOT 0.3 02/17/2020    ALKPHOS 112 02/17/2020    AST 40 02/17/2020    ALT 26 02/17/2020       POC Tests: No results for input(s): POCGLU, POCNA, POCK, POCCL, POCBUN, POCHEMO, POCHCT in the last 72 hours.     Coags:   Lab Results   Component Value Date    PROTIME 13.3 07/10/2019    INR 1.17 07/10/2019    APTT 25.7 06/16/2014       HCG (If Applicable): No results found for: PREGTESTUR, PREGSERUM, HCG, HCGQUANT     ABGs:   Lab Results   Component Value Date    PHART 7.440 06/26/2019    PO2ART 55.9 06/26/2019    PUP4KZK 30.8 06/26/2019    AMZ1UMR 20.4 06/26/2019    BEART -2.9 06/26/2019    T3PIRQUS 90.6 06/26/2019        Type & Screen (If Applicable):  No results found for: LABABO, [address]    Anesthesia Evaluation  Patient summary reviewed and Nursing notes reviewed no history of anesthetic complications:   Airway: Mallampati: III     Neck ROM: full   Dental:          Pulmonary:Negative Pulmonary ROS and normal exam    (+) pneumonia:  COPD (3L intermittent when SOB):  shortness of breath:                             Cardiovascular:Negative CV ROS    (+) hypertension:, dysrhythmias: atrial fibrillation, CHF:, hyperlipidemia                  Neuro/Psych:   Negative Neuro/Psych ROS  (+) neuromuscular disease (Ulnar neuropathy):, headaches: migraine headaches, psychiatric history:depression/anxiety             GI/Hepatic/Renal: Neg GI/Hepatic/Renal ROS       (-) hiatal hernia and GERD       Endo/Other: Negative Endo/Other ROS   (+) Diabetes, hypothyroidism::., .                 Abdominal:

## 2020-03-03 ENCOUNTER — TELEPHONE (OUTPATIENT)
Dept: FAMILY MEDICINE CLINIC | Age: 65
End: 2020-03-03

## 2020-03-06 ENCOUNTER — HOSPITAL ENCOUNTER (OUTPATIENT)
Dept: OCCUPATIONAL THERAPY | Age: 65
Setting detail: THERAPIES SERIES
Discharge: HOME | End: 2020-03-06
Payer: MEDICARE

## 2020-03-06 ENCOUNTER — OFFICE VISIT (OUTPATIENT)
Dept: FAMILY MEDICINE CLINIC | Age: 65
End: 2020-03-06
Payer: MEDICARE

## 2020-03-06 ENCOUNTER — CARE COORDINATION (OUTPATIENT)
Dept: CARE COORDINATION | Age: 65
End: 2020-03-06

## 2020-03-06 VITALS
OXYGEN SATURATION: 85 % | WEIGHT: 199.8 LBS | BODY MASS INDEX: 34.84 KG/M2 | HEART RATE: 96 BPM | DIASTOLIC BLOOD PRESSURE: 68 MMHG | SYSTOLIC BLOOD PRESSURE: 116 MMHG | TEMPERATURE: 97.7 F

## 2020-03-06 PROBLEM — I50.9 CHRONIC CONGESTIVE HEART FAILURE (HCC): Status: ACTIVE | Noted: 2020-03-06

## 2020-03-06 PROCEDURE — G8482 FLU IMMUNIZE ORDER/ADMIN: HCPCS | Performed by: FAMILY MEDICINE

## 2020-03-06 PROCEDURE — 1123F ACP DISCUSS/DSCN MKR DOCD: CPT | Performed by: FAMILY MEDICINE

## 2020-03-06 PROCEDURE — 1090F PRES/ABSN URINE INCON ASSESS: CPT | Performed by: FAMILY MEDICINE

## 2020-03-06 PROCEDURE — 97165 OT EVAL LOW COMPLEX 30 MIN: CPT | Performed by: OCCUPATIONAL THERAPIST

## 2020-03-06 PROCEDURE — 2022F DILAT RTA XM EVC RTNOPTHY: CPT | Performed by: FAMILY MEDICINE

## 2020-03-06 PROCEDURE — 3046F HEMOGLOBIN A1C LEVEL >9.0%: CPT | Performed by: FAMILY MEDICINE

## 2020-03-06 PROCEDURE — 4040F PNEUMOC VAC/ADMIN/RCVD: CPT | Performed by: FAMILY MEDICINE

## 2020-03-06 PROCEDURE — G8417 CALC BMI ABV UP PARAM F/U: HCPCS | Performed by: FAMILY MEDICINE

## 2020-03-06 PROCEDURE — G8428 CUR MEDS NOT DOCUMENT: HCPCS | Performed by: FAMILY MEDICINE

## 2020-03-06 PROCEDURE — G8400 PT W/DXA NO RESULTS DOC: HCPCS | Performed by: FAMILY MEDICINE

## 2020-03-06 PROCEDURE — 1036F TOBACCO NON-USER: CPT | Performed by: FAMILY MEDICINE

## 2020-03-06 PROCEDURE — 3017F COLORECTAL CA SCREEN DOC REV: CPT | Performed by: FAMILY MEDICINE

## 2020-03-06 PROCEDURE — 97110 THERAPEUTIC EXERCISES: CPT | Performed by: OCCUPATIONAL THERAPIST

## 2020-03-06 PROCEDURE — 99213 OFFICE O/P EST LOW 20 MIN: CPT | Performed by: FAMILY MEDICINE

## 2020-03-06 RX ORDER — IBUPROFEN 200 MG
1 TABLET ORAL 2 TIMES DAILY
Qty: 100 EACH | Refills: 5 | Status: SHIPPED | OUTPATIENT
Start: 2020-03-06

## 2020-03-06 NOTE — PLAN OF CARE
[address] [place], [address] [place] [address] [address]  Phone: [phone] Fax: [phone]        Occupational Rosetta Guajardo Certification  Dear Referring Practitioner: Dr. Adina Alvarez,     We had the pleasure of evaluating the following patient for occupational therapy services at [address] [place] [address]. A summary of our findings can be found in the initial assessment below. This includes our plan of care. If you have any questions or concerns regarding these findings, please do not hesitate to contact me at the office phone number checked above. Thank you for the referral.     Physician Signature:_______________________________Date:__________________  By signing above (or electronic signature), therapists plan is approved by physician      Patient: Fawad Charles   : 1955   MRN: [id number]  Referring Physician: Referring Practitioner: Dr. Adina Alvarez      Evaluation Date: 3/6/2020      Medical Diagnosis Information:  Diagnosis: G56.22 (ICD-10-CM) - Ulnar neuropathy at elbow of left upper extremity, cubital tunnel release    Treatment Diagnosis: M25.522              Insurance information: OT Insurance Information: Medicare      Date of Surgery: 3/2/20    Date of Patient follow up with Physician: 3/13/20    RESTRICTIONS/PRECAUTIONS: Avoid heavy lifting x 2 weeks    Latex Allergy:  [x]No      []Yes  Pacemaker:  [x] No       [] Yes     Preferred Language for Healthcare:   [x]English       []other:     Functional Scale: 57% (Quick DASH)   Date assessed:  3/6/2020    SUBJECTIVE: Patient reported deficits/history of current problem: referred for therapy following recent sx    Pain Scale: 0/10  [x]Constant      []Intermittent    []other:      [x] Patient reported history, allergies, and medications reviewed - see intake form.        Occupational Profile:  Home Enviroment: lives with  [x] spouse,  [] family,  [] alone, higher risk   TUG score (>12s at risk):     [] Falls education provided, including      Review Of Systems (ROS): [x]Performed Review of systems (Integumentary, CardioPulmonary, Neurological) by intake and observation. Intake form has been scanned into medical record. Patient has been instructed to contact their primary care physician regarding ROS issues if not already being addressed at this time. ASSESSMENT:   This patient presents with signs and symptoms consistent with the medical diagnosis provided by the referring physician. Impairments (physical, cognitive and/or psychosocial):  [] Decreased mobility  [] Weakness    [] Hypersensitivity   [] Pain/tenderness   [x] Edema/swelling   [] Decreased coordination (fine/gross motor)   [] Impaired body mechanics  [x] Sensory loss  [] Loss of balance   [] Other:      Performance Deficits (to be addressed in plan of care):   [x] Bathing    [x] Household Tasks    [] Dressing    [] Self Feeding   [] Grooming    [] Work/Education   [] Functional Mobility  [] Sleeping/Rest   [] Toileting/Hygiene   [] Recreational Activities   [] Driving    [] Community/Social Participation   [] Other:     Rehab Potential:   [] Excellent [x] Good [] Fair  [] Poor     Barriers affecting rehab potential:  []Age    []Lack of Motivation   []Co-Morbidities  []Cognitive Function  []Environmental/home/work barriers  []Other: Tolerance of evaluation/treatment:    [] Excellent [x] Good [] Fair  [] Poor    PLAN OF CARE:  Interventions:   [x] Therapeutic Exercise [x] Therapeutic Activity    [x] Activities of Daily Living [x] Neuromuscular Re-education      [x] Patient Education  [x] Manual Therapy      [x] Modalities as needed, and not otherwise contraindicated, including: ultrasound,paraffin,moist heat/cold pack, electrical stimulation, contrast bath, iontophoresis  [] Splinting    Frequency/Duration:  1x      GOALS:    Therapist goals for Patient:   Short Term Goals:  To be achieved in: 2 assessment. [] high complexity, including analysis of occupational profile, analysis of data from comprehensive assessment and consideration of multiple treatment options. Multiple comorbidities present that affect occupational performance. Significant task modifications or assistance needed to complete assessment.     Evaluation Code:  [x] Low Complexity EVAL 32188 (typically 30 minutes face to face)  [] Mod Complexity EVAL 00349 (typically 45 minutes face to face)  [] High Complexity EVAL 07494 (typically 60 minutes face to face)    Electronically signed by:  Tiny Loge OTR/LORRI, [facility]

## 2020-03-06 NOTE — PATIENT INSTRUCTIONS
Patient Education        Giving a Mixed-Dose Insulin Shot: Care Instructions  Your Care Instructions    Insulin is normally made by the pancreas, a gland behind the stomach. In people with diabetes, the pancreas no longer makes enough insulin or it stops making it. Without insulin, your blood sugar level rises to dangerous levels. When this happens, you need insulin shots to keep your blood sugar in your target range. You may be nervous giving a shot at first. But soon, giving yourself a shot will become routine. It is quite easy to learn how to draw up insulin into a syringe and give the shot. The needles you use to give the insulin injections are very thin, and most people who have diabetes say that they do not even feel the needle enter the skin. Even if you do feel the injection, the sting of the shot is not bad and does not last long. More than half a million people do it every day. You can too. Follow-up care is a key part of your treatment and safety. Be sure to make and go to all appointments, and call your doctor if you are having problems. It's also a good idea to know your test results and keep a list of the medicines you take. How can you care for yourself at home? Getting started  · Gather your supplies. You will need an insulin syringe, your bottles of insulin, and an alcohol wipe or a cotton ball dipped in alcohol. Keep your supplies in a bag or kit so you can carry the supplies wherever you go. · Check the labels on the bottles and contents. Read and follow all instructions on the label, including how to store the insulin and how long the insulin will last.  · Wash your hands with soap and running water. Dry them well. Preparing the shot  For a mixed-dose insulin shot:  1. Roll the insulin bottles gently between your hands. This will warm the insulin if you have kept the bottle in the refrigerator.  Roll the cloudy insulin bottle until the white powder has dissolved and the insulin is mixed.  2. Wipe the rubber lid of both insulin bottles with an alcohol wipe or a cotton ball dipped in alcohol. (If you are using a bottle for the first time, remove the protective cover over the rubber lid.) Let the top dry before you remove any insulin. 3. Remove the plastic cap from the needle on your insulin syringe. Take care not to touch the needle. 4. Pull the plunger back on your insulin syringe, and draw air into the syringe equal to the number of units of cloudy insulin to be given. 5. Push the needle of the syringe into the rubber lid of the cloudy insulin bottle. Push the plunger of the syringe to force the air into the bottle. This equalizes the pressure in the bottle when you later remove the dose of insulin. Remove the needle from the bottle, but do not draw up any insulin. 6. Pull the plunger of the syringe back and draw air into the syringe equal to the number of units of clear insulin to be given. 7. Push the needle of the syringe into the rubber lid of the clear insulin bottle. Push the plunger to force the air into the bottle. Leave the needle in the bottle. 8. Turn the bottle and syringe upside down, and hold them in one hand. Position the tip of the needle so that it is below the surface of insulin in the bottle. Pull back the plunger to fill the syringe with slightly more than the correct number of units of clear insulin to be given. 9. Tap the outside (barrel) of the syringe so that trapped air bubbles move into the needle area. Push the air bubbles back into the bottle. Make sure that you have the correct number of units of insulin in your syringe. Remove the needle from the clear insulin bottle. 10. Insert the needle into the rubber lid of the cloudy insulin bottle. Do not push the plunger, because this would force clear insulin into your cloudy insulin bottle.  If clear insulin is mixed in the bottle of cloudy, it will change the action of your other doses from that doctor may advise you to give your shots in different places on your body each day. This is called site rotation. Make sure you talk to your doctor about how to do this safely. If you rotate sites, use the same site at the same time of each day. For example, each day:  · At breakfast, give the shot in one of your arms. · At lunch, give the shot in one of your legs. · At dinner, give the shot in your belly. Slightly change the spot where you give an insulin shot each time you do it. For example, use five different places on the right upper arm, then use five places on the left upper arm. Using the same spot every time can cause bumps or pits in the skin and make the shots hurt more. It may also slow down how the insulin is absorbed into your body. Where can you learn more? Go to https://eGisticspepiceweb.Orchestrate. org and sign in to your [employer] account. Enter K026 in the [employer] box to learn more about \"Giving a Mixed-Dose Insulin Shot: Care Instructions. \"     If you do not have an account, please click on the \"Sign Up Now\" link. Current as of: April 16, 2019  Content Version: 12.3  © 7410-6413 Healthwise, Incorporated. Care instructions adapted under license by [facility] ([facility]). If you have questions about a medical condition or this instruction, always ask your healthcare professional. [address] any warranty or liability for your use of this information.

## 2020-03-06 NOTE — FLOWSHEET NOTE
driving/computer work. [x] Comments: Issued tensogrip E, gauze    Therapeutic Activities:    [x] (90428 or 21245) Provided verbal/tactile cueing for activities related to improving balance, coordination, kinesthetic sense, posture, motor skill, proprioception and motor activation to allow for proper function of scapular, scapulothoracic and UE control with self care, carrying, lifting, driving/computer work  [] Comments:    Home Exercise Program:    [] (44844) Reviewed/Progressed HEP activities related to strengthening, flexibility, endurance, ROM of scapular, scapulothoracic and UE control with self care, reaching, carrying, lifting, house/yardwork, driving/computer work  [] (88872) Reviewed/Progressed HEP activities related to improving balance, coordination, kinesthetic sense, posture, motor skill, proprioception of scapular, scapulothoracic and UE control with self care, reaching, carrying, lifting, house/yardwork, driving/computer work    [x] Comments: Elbow/wrist AROM daily.  Finger ext with elbow ext for nerve gliding    Manual Treatments:  PROM / STM / Oscillations-Mobs:  G-I, II, III, IV (PA's, Inf., Post.)  [] (56570) Provided manual therapy to mobilize soft tissue/joints of cervical/CT, scapular GHJ and UE for the purpose of modulating pain, promoting relaxation,  increasing ROM, reducing/eliminating soft tissue swelling/inflammation/restriction, improving soft tissue extensibility and allowing for proper ROM for normal function with self care, reaching, carrying, lifting, house/yardwork, driving/computer work  [] Comments:    Splinting:  [] Fabrication of:   [] (72837) Checkout for orthotic/prosthetic use, established patient   [] (87877) Orthotic management and training (fitting and assessment)  [] Comments:    Charges:  Timed Code Treatment Minutes: 25   Total Treatment Minutes: 40     [x] EVAL (LOW) 99378   [] OT Re-eval (44178)  [] EVAL (MOD) 70287   [] EVAL (HIGH) 14762       [x] Maximiliano ([id number]) x 2

## 2020-03-09 NOTE — CARE COORDINATION
Ambulatory Care Coordination Note  3/6/2020  CM Risk Score: 12  [place] 10 Year Mortality Risk Score: 98%     ACC: Arnie Camacho RN    Summary Note: Met with pt & her daughter at PCP OV. Pt is here to discuss insulin as her spouse's health coverage ended at the first of the year & she is unable to afford current treatments. Pt has already initiated process of applying for Medicaid & other public assistance, she lives outside the coverage area for [employer].  Pt denies additional needs from ACM at this time, she is very well informed about her chronic health conditions & remains engaged in following the treatment plan of her health care providers. Will recommend pt for graduation from Care Coordination at this time. Provided pt with ACM contact information should new needs arise. Care Coordination Interventions    Program Enrollment:  Complex Care  Referral from Primary Care Provider:  No  Suggested Interventions and [address]:  Completed (Comment: 7/24 - Currently receiving home care from [facility] for SN, PT, OT)  Zone Management Tools: In Process (Comment: 7/24 - Initiate COPD zone tool)         Goals Addressed                 This Visit's Progress     COMPLETED: Conditions and Symptoms        I will follow my Zone Management tool to seek urgent or emergent care. Barriers: lack of education  Plan for overcoming my barriers: Patient will be able to recognize s/s that indicate worsening of her condition. Patient will verbalize plan to enact before symptoms increase and ER is unaviodable. Confidence: 7/10  Anticipated Goal Completion Date: 09/24/2019              Prior to Admission medications    Medication Sig Start Date End Date Taking?  Authorizing Provider   insulin 70-30 (NOVOLIN 70/30) (70-30) 100 UNIT per ML injection vial Inject 10-50 Units into the skin 2 times daily (before meals) 3/6/20   Galilea Sosa MD   INSULIN SYRINGE 1CC/29G 29G X 1/2\" 1 ML MISC 1 each by Does not apply route 2 times daily 3/6/20   Vika Aguilar MD   ibuprofen (ADVIL;MOTRIN) 600 MG tablet Take 1 tablet by mouth every 6 hours as needed for Pain 3/2/20   Chiquita Morales MD   oxyCODONE-acetaminophen (PERCOCET) 5-325 MG per tablet Take 1 tablet by mouth every 6 hours as needed for Pain for up to 7 days. 3/2/20 3/9/20  Chiquita Morales MD   ondansetron [facility]) 4 MG tablet Take 1 tablet by mouth every 8 hours as needed for Nausea 3/2/20   Chiquita Morales MD   KLOR-CON M20 20 MEQ extended release tablet TAKE 1 TABLET BY MOUTH EVERY DAY 2/28/20   Vika Aguilar MD   furosemide (LASIX) 40 MG tablet TAKE 1 TABLET BY MOUTH EVERY DAY 2/28/20   Vika Aguilar MD   FLUTICASONE FUROATE IN Inhale into the lungs as needed    Historical Provider, MD   Multiple Vitamins-Minerals (MULTIVITAMIN ADULT PO) Take by mouth daily    Historical Provider, MD   hydrOXYzine (ATARAX) 10 MG tablet TAKE 1 TO 3 TABLETS BY MOUTH 3 TIMES DAILY AS NEEDED FOR ITCHING 2/18/20   Vika Aguilar MD   nadolol (CORGARD) 20 MG tablet Take 1 tablet by mouth daily 2/18/20   Vika Aguilar MD   levothyroxine (SYNTHROID) 150 MCG tablet Take 1 tablet by mouth Daily 2/18/20   Vika Aguilar MD   predniSONE (DELTASONE) 5 MG tablet Take 1 tablet by mouth daily 2/3/20   Susy Gould MD   albuterol (PROVENTIL) (2.5 MG/3ML) 0.083% nebulizer solution INHALE 3 MLS BY NEBULIZATION EVERY 6 HOURS AS NEEDED FOR WHEEZING 1/22/20   Alonso Campos MD   empagliflozin (JARDIANCE) 25 MG tablet Take 1 tablet by mouth daily 1/3/20   Vika Aguilar MD   LORazepam (ATIVAN) 0.5 MG tablet Take 1 tablet by mouth every 6 hours as needed for Anxiety for up to 180 days.  12/12/19 6/9/20  Vika Aguilar MD   mirtazapine (REMERON) 30 MG tablet TAKE 1 TABLET BY MOUTH EVERY DAY AT NIGHT 12/5/19   Vika Aguilar MD   SITagliptin [facility]) 100 MG tablet Take 1 tablet by mouth daily 11/15/19   Vika Aguilar MD   dapagliflozin (FARXIGA) 10 MG

## 2020-03-13 ENCOUNTER — OFFICE VISIT (OUTPATIENT)
Dept: ORTHOPEDIC SURGERY | Age: 65
End: 2020-03-13
Payer: MEDICARE

## 2020-03-13 PROCEDURE — E0191 PROTECTOR HEEL OR ELBOW: HCPCS | Performed by: ORTHOPAEDIC SURGERY

## 2020-03-13 PROCEDURE — 99024 POSTOP FOLLOW-UP VISIT: CPT | Performed by: ORTHOPAEDIC SURGERY

## 2020-03-13 NOTE — PROGRESS NOTES
Chief Complaint:  Post-Op Check (PO CK LT UNLNAR NERVE DECOMPRESSION 3/2/20)      History of Present of Illness: The patient returns for her initial postop appointment and does note already significant improvement in the discomfort down the ulnar arm. Review of Systems  Pertinent items are noted in HPI  Denies fever, chills, confusion, bowel/bladder active change. Review of systems reviewed from Patient History Form dated on 1/10/2020 and available in the patient's chart under the Media tab. Examination:  On exam today her incision is healing nicely and she demonstrates full range of motion into the hand. As noted before she does have some thinning into the intrinsics but she does fire the intrinsics well although with some residual weakness. Radiology:     X-rays obtained and reviewed in office:  Views    Location    Impression      Orders Placed This Encounter   Procedures    Bird and Judy Heel and Elbow Protector     Patient was prescribed a Bird and Judy Elbow Protector. The left elbow will require protection from this device to improve their function. The orthosis will assist in protecting the affected area, provide functional support and facilitate healing. The patient was educated and fit by a healthcare professional with expert knowledge and specialization in brace application while under the direct supervision of the treating physician. Verbal and written instructions for the use of and application of this item were provided. They were instructed to contact the office immediately should the brace result in increased pain, decreased sensation, increased swelling or worsening of the condition. Impression:  Encounter Diagnosis   Name Primary?  Ulnar neuropathy at elbow of left upper extremity Yes         Treatment Plan:   Today we will go ahead and remove sutures and apply Steri-Strips and we will make certain she does have a padded elbow sleeve to use so as to minimize pressure and aggravation over the medial elbow. She does understand realistic expectations of slow progressive healing over the next many months. I will see her back in 1 month to make certain she is making good overall progress. Please note that this transcription was created using voice recognition software. Any errors are unintentional and may be due to voice recognition transcription.

## 2020-03-30 RX ORDER — MIRTAZAPINE 30 MG/1
TABLET, FILM COATED ORAL
Qty: 30 TABLET | Refills: 2 | Status: SHIPPED | OUTPATIENT
Start: 2020-03-30 | End: 2020-03-31

## 2020-04-01 ENCOUNTER — TELEPHONE (OUTPATIENT)
Dept: ORTHOPEDIC SURGERY | Age: 65
End: 2020-04-01

## 2020-04-03 ENCOUNTER — TELEPHONE (OUTPATIENT)
Dept: PULMONOLOGY | Age: 65
End: 2020-04-03

## 2020-04-03 ENCOUNTER — TELEPHONE (OUTPATIENT)
Dept: ORTHOPEDIC SURGERY | Age: 65
End: 2020-04-03

## 2020-04-03 ENCOUNTER — VIRTUAL VISIT (OUTPATIENT)
Dept: PULMONOLOGY | Age: 65
End: 2020-04-03
Payer: MEDICARE

## 2020-04-03 PROCEDURE — 99214 OFFICE O/P EST MOD 30 MIN: CPT | Performed by: INTERNAL MEDICINE

## 2020-04-03 NOTE — PROGRESS NOTES
[facility]    Outpatient Follow Up Note    Chief Complaint: Hospital follow-up and SOB  Consulting provider: Dr Frias Francisco Javier    Interval History: 77 y.o. female Hartness of breath remains Vasil improved. She has weaned herself off of oxygen and reports SPO2 of 96%. She is able to walk with a walker. Her swelling has improved. No fever. No significant cough. Prior HPI:  admitted to the hospital for the ?4-5th time with similar presentations of SOB and Hypoxia and bilateral lung infiltrates. Bronchoscopy was again negative for infection. A right heart cath that was normal and then she had a subsequent high resolution CT scan while she was known to be euvolemic that showed persistent abnromalities. It seems to be most consistent with an ILD process. Possibly  or chronic HP or NSIP. Pt notes mold in a back room of her house. She stays out of this room. She is planning on having this remediated. Initial HPI:regarding abnormal PFTs, cough, congestion. The pt developed SOB and cough with green sputum, chest congestion, nasal congestion and sinus pressure since April. Her symptoms intermittently improved with symptomatic treatments and nasal spray. Her SOB was better with her inhaler. Overall while her symptoms have waxed and waned they have not fully resolved. The patient does not have SOB at rest but has LEE. Exercise tolerance on level ground is 1 block. Stairs. The patient has a  daily intermittent cough that is usually dry or sometimes productive of green sputum. The patient does wheeze intermittently.   Reports waking up feeling up gagging and having missed a breath.     The pt was seen by Dr Saima Neumann at [facility] and was thought to have some sinus disease contributing to her cough and she also had PFTs to work up a pulmonary cause that were inconclusive.     Never smoker  Environmental/chemical exposure: Asbestos exposure: no    Patient worked as home health aid    Current Outpatient Medications:     mirtazapine (REMERON) 30 MG tablet, TAKE 1 TABLET BY MOUTH EVERY DAY AT NIGHT, Disp: 30 tablet, Rfl: 2    insulin 70-30 (NOVOLIN 70/30) (70-30) 100 UNIT per ML injection vial, Inject 10-50 Units into the skin 2 times daily (before meals), Disp: 1 vial, Rfl: 5    INSULIN SYRINGE 1CC/29G 29G X 1/2\" 1 ML MISC, 1 each by Does not apply route 2 times daily, Disp: 100 each, Rfl: 5    ibuprofen (ADVIL;MOTRIN) 600 MG tablet, Take 1 tablet by mouth every 6 hours as needed for Pain, Disp: 60 tablet, Rfl: 1    ondansetron (ZOFRAN) 4 MG tablet, Take 1 tablet by mouth every 8 hours as needed for Nausea, Disp: 10 tablet, Rfl: 1    KLOR-CON M20 20 MEQ extended release tablet, TAKE 1 TABLET BY MOUTH EVERY DAY, Disp: 30 tablet, Rfl: 5    furosemide (LASIX) 40 MG tablet, TAKE 1 TABLET BY MOUTH EVERY DAY, Disp: 30 tablet, Rfl: 5    FLUTICASONE FUROATE IN, Inhale into the lungs as needed, Disp: , Rfl:     Multiple Vitamins-Minerals (MULTIVITAMIN ADULT PO), Take by mouth daily, Disp: , Rfl:     hydrOXYzine (ATARAX) 10 MG tablet, TAKE 1 TO 3 TABLETS BY MOUTH 3 TIMES DAILY AS NEEDED FOR ITCHING, Disp: 90 tablet, Rfl: 0    nadolol (CORGARD) 20 MG tablet, Take 1 tablet by mouth daily, Disp: 30 tablet, Rfl: 3    levothyroxine (SYNTHROID) 150 MCG tablet, Take 1 tablet by mouth Daily, Disp: 30 tablet, Rfl: 3    predniSONE (DELTASONE) 5 MG tablet, Take 1 tablet by mouth daily, Disp: 30 tablet, Rfl: 1    albuterol (PROVENTIL) (2.5 MG/3ML) 0.083% nebulizer solution, INHALE 3 MLS BY NEBULIZATION EVERY 6 HOURS AS NEEDED FOR WHEEZING, Disp: 375 mL, Rfl: 5    empagliflozin (JARDIANCE) 25 MG tablet, Take 1 tablet by mouth daily, Disp: 90 tablet, Rfl: 1    LORazepam (ATIVAN) 0.5 MG tablet, Take 1 tablet by mouth every 6 hours as needed for Anxiety for up to 180 days. , Disp: 30 tablet, Rfl: 5    SITagliptin (JANUVIA) 100 MG tablet, Take 1 tablet by mouth daily, Disp: 90 tablet, Rfl: 1    dapagliflozin

## 2020-04-03 NOTE — TELEPHONE ENCOUNTER
Within this Telehealth Consent, the terms you and yours refer to the person using the Telehealth Service (Service), or in the case of a use of the Service by or on behalf of a minor, you and yours refer to and include (i) the parent or legal guardian who provides consent to the use of the Service by such minor or uses the Service on behalf of such minor, and (ii) the minor for whom consent is being provided or on whose behalf the Service is being utilized. When using Service, you will be consulting with your health care providers via the use of Telehealth.   Telehealth involves the delivery of healthcare services using electronic communications, information technology or other means between a healthcare provider and a patient who are not in the same physical location. Telehealth may be used for diagnosis, treatment, follow-up and/or patient education, and may include, but is not limited to, one or more of the following:    Electronic transmission of medical records, photo images, personal health information or other data between a patient and a healthcare provider    Interactions between a patient and healthcare provider via audio, video and/or data communications    Use of output data from medical devices, sound and video files    Anticipated Benefits   The use of Telehealth by your Provider(s) through the Service may have the following possible benefits:    Making it easier and more efficient for you to access medical care and treatment for the conditions treated by such Provider(s) utilizing the Service    Allowing you to obtain medical care and treatment by Provider(s) at times that are convenient for you    Enabling you to interact with Provider(s) without the necessity of an in-office appointment     Possible Risks   While the use of Telehealth can provide potential benefits for you, there are also potential risks associated with the use of Telehealth.  These risks include, but may not be limited to the following:    Your Provider(s) may not able to provide medical treatment for your particular condition and you may be required to seek alternative healthcare or emergency care services.  The electronic systems or other security protocols or safeguards used in the Service could fail, causing a breach of privacy of your medical or other information.  Given regulatory requirements in certain jurisdictions, your Provider(s) diagnosis and/or treatment options, especially pertaining to certain prescriptions, may be limited. Acceptance   1. You understand that Services will be provided via Telehealth. This process involves the use of HIPAA compliant and secure, real-time audio-visual interfacing with a qualified and appropriately trained provider located at [facility]. 2. You understand that, under no circumstances, will this session be recorded. 3. You understand that the Provider(s) at [facility] and other clinical participants will be party to the information obtained during the Telehealth session in accordance with best medical practices. 4. You understand that the information obtained during the Telehealth session will be used to help determine the most appropriate treatment options. 5. You understand that You have the right to revoke this consent at any point in time. 6. You understand that Telehealth is voluntary, and that continued treatment is not dependent upon consent. 7. You understand that, in the event of non-consent to Telehealth services and/or technical difficulties, you will obtain services as typically provided in the absence of Telehealth technology. 8. You understand that this consent will be kept in Your medical record. 9. No potential benefits from the use of Telehealth or specific results can be guaranteed. Your condition may not be cured or improved and, in some cases, may get worse.    10. There are limitations in the provision of medical care and treatment via Telehealth and the Service and you may not be able to receive diagnosis and/or treatment through the Service for every condition for which you seek diagnosis and/or treatment. 11. There are potential risks to the use of Telehealth, including but not limited to the risks described in this Telehealth Consent. 12. Your Provider(s) have discussed the use of Telehealth and the Service with you, including the benefits and risks of such and you have provided oral consent to your Provider(s) for the use of Telehealth and the Service. 15. You understand that it is your duty to provide your Provider(s) truthful, accurate and complete information, including all relevant information regarding care that you may have received or may be receiving from other healthcare providers outside of the Service. 14. You understand that each of your Provider(s) may determine in his or sole discretion that your condition is not suitable for diagnosis and/or treatment using the Service, and that you may need to seek medical care and treatment a specialist or other healthcare provider, outside of the Service. 15. You understand that you are fully responsible for payment for all services provided by Provider(s) or through use of the Service and that you may not be able to use third-party insurance. 16. You represent that (a) you have read this Telehealth Consent carefully, (b) you understand the risks and benefits of the Service and the use of Telehealth in the medical care and treatment provided to you by Provider(s) using the Service, and (c) you have the legal capacity and authority to provide this consent for yourself and/or the minor for which you are consenting under applicable federal and state laws, including laws relating to the age of [de-identified] and/or parental/guardian consent.    17. You give your informed consent to the use of Telehealth by Provider(s) using the Service under

## 2020-04-10 ENCOUNTER — VIRTUAL VISIT (OUTPATIENT)
Dept: ORTHOPEDIC SURGERY | Age: 65
End: 2020-04-10

## 2020-04-10 VITALS — BODY MASS INDEX: 34.1 KG/M2 | WEIGHT: 199.74 LBS | HEIGHT: 64 IN

## 2020-04-10 PROCEDURE — 99024 POSTOP FOLLOW-UP VISIT: CPT | Performed by: ORTHOPAEDIC SURGERY

## 2020-04-20 RX ORDER — BENZONATATE 200 MG/1
CAPSULE ORAL
Qty: 30 CAPSULE | Refills: 1 | Status: SHIPPED | OUTPATIENT
Start: 2020-04-20 | End: 2020-05-22 | Stop reason: SDUPTHER

## 2020-05-12 ENCOUNTER — VIRTUAL VISIT (OUTPATIENT)
Dept: PULMONOLOGY | Age: 65
End: 2020-05-12
Payer: MEDICARE

## 2020-05-12 VITALS — HEART RATE: 86 BPM | OXYGEN SATURATION: 95 %

## 2020-05-12 PROCEDURE — 99214 OFFICE O/P EST MOD 30 MIN: CPT | Performed by: INTERNAL MEDICINE

## 2020-05-12 NOTE — PROGRESS NOTES
[facility]    Outpatient Follow Up Note    Chief Complaint: Hospital follow-up and SOB  Consulting provider: Dr Janie Reynaga    Interval History: 77 y.o. female mild dyspnea on exertion and intermittent shortness of breath but remains much improved. She has a daily mostly dry cough that is chronic. No wheezing. Her oxygen levels have been normal.  She is taking prednisone 2.5 mg every other day. No hospitalizations. No fever. Prior HPI:  admitted to the hospital for the ?4-5th time with similar presentations of SOB and Hypoxia and bilateral lung infiltrates. Bronchoscopy was again negative for infection. A right heart cath that was normal and then she had a subsequent high resolution CT scan while she was known to be euvolemic that showed persistent abnromalities. It seems to be most consistent with an ILD process. Possibly  or chronic HP or NSIP. Pt notes mold in a back room of her house. She stays out of this room. She is planning on having this remediated. Initial HPI:regarding abnormal PFTs, cough, congestion. The pt developed SOB and cough with green sputum, chest congestion, nasal congestion and sinus pressure since April. Her symptoms intermittently improved with symptomatic treatments and nasal spray. Her SOB was better with her inhaler. Overall while her symptoms have waxed and waned they have not fully resolved. The patient does not have SOB at rest but has LEE. Exercise tolerance on level ground is 1 block. Stairs. The patient has a  daily intermittent cough that is usually dry or sometimes productive of green sputum. The patient does wheeze intermittently.   Reports waking up feeling up gagging and having missed a breath.     The pt was seen by Dr Jairo Trejo at [facility] and was thought to have some sinus disease contributing to her cough and she also had PFTs to work up a pulmonary cause that were inconclusive.     Never smoker  Environmental/chemical exposure: chronic inflammation and fibrosis with rare  ill-formed clusters of epithelioid histiocytes. - Negative for well-formed  granulomas, giant cells, atypia, and  malignancy. Comment: History of COPD and imaging findings of progressive diffuse ground glass opacities with traction bronchiectasis with differential diagnosis of organizing pneumonia is noted. The current biopsy specimen reveals a predominant pattern of organizing  pneumonia (upper and lower lobes > lingula). Given the history of multiple  recurrent episodes of pneumonia requiring hostpitalization, the morphologic  findings best fit a resolving acute lung injury, with organizing pneumonia  and reactive pneumocyte proliferation. The differential diagnosis for this  pattern of injury  include infectious etiology (immunostain for HSV, CMV,  insitu hybridization for adenovirus and special stains for bacterial and  fungal organisms are all negative),residuum of prior acute  bronchopneumonia, toxic exposure, connective tissue disease or drug related  lung injury which when excluded, a cryptogenic organizing pneumonia may be  considered. However, in addition to an organizing pneumonia pattern of  injury (predominant pattern within this specimen), there is a component of  chronic interstitial fibroinflammatory process comprising lymphocytes,  plasma cells and histiocytes with occasional foci of interstitial poorly  formed aggregates of epithelioid histiocytes. These findings raise concern  of an underlying chronic fibro-inflammatory injury the differential  diagnosis includes chronic hypersensitivity pneumonitis or NSIP. A treated  vasculitis and treated eosinophilic pneumonia are in the differential and  cannot be entirely excluded. Final Diagnosis performed by  Law Nunez MD    Second opinion on path from [facility] [place] canned into the chart    12/24/19 BAL neg    PFTs 9/24/18 at [facility] were inconclusive due to excessive variability in her results.

## 2020-05-19 RX ORDER — LEVOTHYROXINE SODIUM 0.15 MG/1
150 TABLET ORAL DAILY
Qty: 30 TABLET | Refills: 3 | OUTPATIENT
Start: 2020-05-19

## 2020-05-19 RX ORDER — AMITRIPTYLINE HYDROCHLORIDE 25 MG/1
TABLET, FILM COATED ORAL
Qty: 30 TABLET | Refills: 0 | OUTPATIENT
Start: 2020-05-19

## 2020-05-19 RX ORDER — POTASSIUM CHLORIDE 20 MEQ/1
TABLET, EXTENDED RELEASE ORAL
Qty: 30 TABLET | Refills: 5 | OUTPATIENT
Start: 2020-05-19

## 2020-05-19 RX ORDER — FUROSEMIDE 40 MG/1
TABLET ORAL
Qty: 30 TABLET | Refills: 5 | OUTPATIENT
Start: 2020-05-19

## 2020-05-19 RX ORDER — SERTRALINE HYDROCHLORIDE 100 MG/1
200 TABLET, FILM COATED ORAL DAILY
Qty: 60 TABLET | Refills: 5 | OUTPATIENT
Start: 2020-05-19

## 2020-05-19 RX ORDER — MIRTAZAPINE 30 MG/1
TABLET, FILM COATED ORAL
Qty: 30 TABLET | Refills: 2 | OUTPATIENT
Start: 2020-05-19

## 2020-05-19 RX ORDER — TRAZODONE HYDROCHLORIDE 50 MG/1
TABLET ORAL
Qty: 30 TABLET | Refills: 0 | OUTPATIENT
Start: 2020-05-19

## 2020-05-19 NOTE — TELEPHONE ENCOUNTER
Due to insurance, patient has to use [facility] mail away for 90 day supply. Also, daughter is asking if patient can restart Januvia and [place] since they have her insurance straightened out.

## 2020-05-22 ENCOUNTER — TELEMEDICINE (OUTPATIENT)
Dept: FAMILY MEDICINE CLINIC | Age: 65
End: 2020-05-22
Payer: MEDICARE

## 2020-05-22 PROCEDURE — G8400 PT W/DXA NO RESULTS DOC: HCPCS | Performed by: FAMILY MEDICINE

## 2020-05-22 PROCEDURE — 2022F DILAT RTA XM EVC RTNOPTHY: CPT | Performed by: FAMILY MEDICINE

## 2020-05-22 PROCEDURE — 1090F PRES/ABSN URINE INCON ASSESS: CPT | Performed by: FAMILY MEDICINE

## 2020-05-22 PROCEDURE — 1123F ACP DISCUSS/DSCN MKR DOCD: CPT | Performed by: FAMILY MEDICINE

## 2020-05-22 PROCEDURE — 99214 OFFICE O/P EST MOD 30 MIN: CPT | Performed by: FAMILY MEDICINE

## 2020-05-22 PROCEDURE — 4040F PNEUMOC VAC/ADMIN/RCVD: CPT | Performed by: FAMILY MEDICINE

## 2020-05-22 PROCEDURE — 3046F HEMOGLOBIN A1C LEVEL >9.0%: CPT | Performed by: FAMILY MEDICINE

## 2020-05-22 PROCEDURE — 3017F COLORECTAL CA SCREEN DOC REV: CPT | Performed by: FAMILY MEDICINE

## 2020-05-22 PROCEDURE — G8428 CUR MEDS NOT DOCUMENT: HCPCS | Performed by: FAMILY MEDICINE

## 2020-05-22 RX ORDER — BENZONATATE 200 MG/1
CAPSULE ORAL
Qty: 30 CAPSULE | Refills: 5 | Status: SHIPPED | OUTPATIENT
Start: 2020-05-22 | End: 2020-06-01 | Stop reason: SDUPTHER

## 2020-05-22 RX ORDER — AMITRIPTYLINE HYDROCHLORIDE 25 MG/1
TABLET, FILM COATED ORAL
Qty: 90 TABLET | Refills: 1 | Status: SHIPPED | OUTPATIENT
Start: 2020-05-22 | End: 2020-06-01 | Stop reason: SDUPTHER

## 2020-05-22 RX ORDER — LEVOTHYROXINE SODIUM 0.15 MG/1
150 TABLET ORAL DAILY
Qty: 90 TABLET | Refills: 1 | Status: SHIPPED | OUTPATIENT
Start: 2020-05-22 | End: 2020-06-01 | Stop reason: SDUPTHER

## 2020-05-22 RX ORDER — NADOLOL 20 MG/1
20 TABLET ORAL DAILY
Qty: 90 TABLET | Refills: 1 | Status: SHIPPED | OUTPATIENT
Start: 2020-05-22 | End: 2020-06-01 | Stop reason: SDUPTHER

## 2020-05-22 RX ORDER — TRAZODONE HYDROCHLORIDE 50 MG/1
TABLET ORAL
Qty: 90 TABLET | Refills: 1 | Status: SHIPPED | OUTPATIENT
Start: 2020-05-22 | End: 2020-06-01 | Stop reason: SDUPTHER

## 2020-05-22 RX ORDER — LORAZEPAM 0.5 MG/1
0.5 TABLET ORAL EVERY 6 HOURS PRN
Qty: 90 TABLET | Refills: 1 | Status: SHIPPED | OUTPATIENT
Start: 2020-05-22 | End: 2020-06-01 | Stop reason: SDUPTHER

## 2020-05-22 RX ORDER — MIRTAZAPINE 30 MG/1
30 TABLET, FILM COATED ORAL NIGHTLY
Qty: 90 TABLET | Refills: 1 | Status: SHIPPED | OUTPATIENT
Start: 2020-05-22 | End: 2020-06-01 | Stop reason: SDUPTHER

## 2020-05-22 RX ORDER — FUROSEMIDE 40 MG/1
TABLET ORAL
Qty: 90 TABLET | Refills: 1 | Status: SHIPPED | OUTPATIENT
Start: 2020-05-22 | End: 2020-06-01 | Stop reason: SDUPTHER

## 2020-05-22 RX ORDER — ALBUTEROL SULFATE 2.5 MG/3ML
SOLUTION RESPIRATORY (INHALATION)
Qty: 750 ML | Refills: 1 | Status: SHIPPED | OUTPATIENT
Start: 2020-05-22 | End: 2020-06-01 | Stop reason: SDUPTHER

## 2020-05-22 RX ORDER — POTASSIUM CHLORIDE 20 MEQ/1
TABLET, EXTENDED RELEASE ORAL
Qty: 90 TABLET | Refills: 1 | Status: SHIPPED | OUTPATIENT
Start: 2020-05-22 | End: 2020-06-01 | Stop reason: SDUPTHER

## 2020-05-22 RX ORDER — SERTRALINE HYDROCHLORIDE 100 MG/1
200 TABLET, FILM COATED ORAL DAILY
Qty: 180 TABLET | Refills: 1 | Status: SHIPPED | OUTPATIENT
Start: 2020-05-22 | End: 2020-06-01 | Stop reason: SDUPTHER

## 2020-05-22 RX ORDER — ALBUTEROL SULFATE 90 UG/1
2 AEROSOL, METERED RESPIRATORY (INHALATION) EVERY 6 HOURS PRN
Qty: 1 INHALER | Refills: 2 | Status: SHIPPED | OUTPATIENT
Start: 2020-05-22 | End: 2020-06-01 | Stop reason: SDUPTHER

## 2020-05-22 ASSESSMENT — ENCOUNTER SYMPTOMS
EYES NEGATIVE: 1
ALLERGIC/IMMUNOLOGIC NEGATIVE: 1
GASTROINTESTINAL NEGATIVE: 1
RESPIRATORY NEGATIVE: 1

## 2020-05-22 NOTE — PROGRESS NOTES
[Cephalexin] Itching and Rash     Tolerates Meropenem.  Tape [place] [employer] Tape] Rash   ,   Past Medical History:   Diagnosis Date    [facility]     Anxiety     Cryptogenic organizing pneumonia ([facility]     Depression     Diabetes mellitus ([facility]     GERD (gastroesophageal reflux disease)     Hyperlipidemia     Hypertension     On home oxygen therapy     uses O2 3L prn    Rash     Thyroid disease     hypothyroidism   ,   Past Surgical History:   Procedure Laterality Date    ARM SURGERY Left 3/2/2020    LEFT ULNAR NERVE DECOMPRESSION AT THE ELBOW performed by Rashmi Gardner MD at [address] 6/27/2019    EBUS WF W/ANES.  performed by Patricia Rico MD at [address]  6/27/2019    BRONCHOSCOPY/TRANSBRONCHIAL NEEDLE BIOPSY performed by Patricia Rico MD at [address]  6/27/2019    BRONCHOSCOPY/TRANSBRONCHIAL LUNG BIOPSY performed by Patricia Rico MD at [address]  6/27/2019    BRONCHOSCOPY ALVEOLAR LAVAGE performed by Patricia Rico MD at [address]  07/10/2019    BRONCHOSCOPY N/A 7/10/2019    BRONCHOSCOPY ALVEOLAR LAVAGE performed by Miky Winston MD at [address] Bilateral 08/06/2019    BRONCHOSCOPY N/A 8/6/2019    BRONCHOSCOPY ALVEOLAR LAVAGE performed by Erin Jang MD at [address] N/A 12/24/2019    BRONCHOSCOPY ALVEOLAR LAVAGE performed by Ky Mario MD at [address] [place] [address], TOTAL ABDOMINAL      partial   ,   Social History     Tobacco Use    Smoking status: Never Smoker    Smokeless tobacco: Never Used   Substance Use Topics    Alcohol use: No    Drug use: No       PHYSICAL EXAMINATION:  [ INSTRUCTIONS:  \"[x]\" Indicates a positive item  \"[]\" Indicates a negative item  -- DELETE ALL ITEMS NOT EXAMINED]  Vital Signs: (As obtained by patient/caregiver or practitioner

## 2020-05-29 ENCOUNTER — HOSPITAL ENCOUNTER (OUTPATIENT)
Dept: GENERAL RADIOLOGY | Age: 65
Discharge: HOME | End: 2020-05-29
Payer: MEDICARE

## 2020-05-29 ENCOUNTER — HOSPITAL ENCOUNTER (OUTPATIENT)
Age: 65
Discharge: HOME | End: 2020-05-29
Payer: MEDICARE

## 2020-05-29 ENCOUNTER — TELEPHONE (OUTPATIENT)
Dept: PULMONOLOGY | Age: 65
End: 2020-05-29

## 2020-05-29 PROCEDURE — 71046 X-RAY EXAM CHEST 2 VIEWS: CPT

## 2020-05-29 NOTE — TELEPHONE ENCOUNTER
[id number] for CXR  Schedule tele visit on monday
VIRTUALLY USING [employer] ME  Pursuant to the emergency declaration under the [facility][note 3][facility], On license of [facility] waiver authority and the [employer] and [employer] Act, this Virtual  Visit was conducted, with patient's consent, to reduce the patient's risk of exposure to COVID-19 and provide continuity of care for an established patient.     Services were provided through a video synchronous discussion virtually to substitute for in-person clinic visit.

## 2020-06-01 ENCOUNTER — VIRTUAL VISIT (OUTPATIENT)
Dept: PULMONOLOGY | Age: 65
End: 2020-06-01
Payer: MEDICARE

## 2020-06-01 ENCOUNTER — TELEPHONE (OUTPATIENT)
Dept: FAMILY MEDICINE CLINIC | Age: 65
End: 2020-06-01

## 2020-06-01 VITALS — OXYGEN SATURATION: 97 % | HEART RATE: 85 BPM

## 2020-06-01 PROCEDURE — 99212 OFFICE O/P EST SF 10 MIN: CPT | Performed by: INTERNAL MEDICINE

## 2020-06-01 RX ORDER — FUROSEMIDE 40 MG/1
TABLET ORAL
Qty: 90 TABLET | Refills: 1 | Status: SHIPPED | OUTPATIENT
Start: 2020-06-01 | End: 2020-11-23 | Stop reason: SDUPTHER

## 2020-06-01 RX ORDER — LORAZEPAM 0.5 MG/1
0.5 TABLET ORAL EVERY 6 HOURS PRN
Qty: 90 TABLET | Refills: 1 | Status: SHIPPED | OUTPATIENT
Start: 2020-06-01 | End: 2020-11-28

## 2020-06-01 RX ORDER — LEVOTHYROXINE SODIUM 0.15 MG/1
150 TABLET ORAL DAILY
Qty: 90 TABLET | Refills: 1 | Status: SHIPPED | OUTPATIENT
Start: 2020-06-01 | End: 2020-11-23 | Stop reason: SDUPTHER

## 2020-06-01 RX ORDER — MIRTAZAPINE 30 MG/1
30 TABLET, FILM COATED ORAL NIGHTLY
Qty: 90 TABLET | Refills: 1 | Status: SHIPPED | OUTPATIENT
Start: 2020-06-01 | End: 2020-11-23 | Stop reason: SDUPTHER

## 2020-06-01 RX ORDER — POTASSIUM CHLORIDE 20 MEQ/1
TABLET, EXTENDED RELEASE ORAL
Qty: 90 TABLET | Refills: 1 | Status: SHIPPED | OUTPATIENT
Start: 2020-06-01 | End: 2020-08-27 | Stop reason: SDUPTHER

## 2020-06-01 RX ORDER — AMITRIPTYLINE HYDROCHLORIDE 25 MG/1
TABLET, FILM COATED ORAL
Qty: 90 TABLET | Refills: 1 | Status: SHIPPED | OUTPATIENT
Start: 2020-06-01 | End: 2020-11-23 | Stop reason: SDUPTHER

## 2020-06-01 RX ORDER — SERTRALINE HYDROCHLORIDE 100 MG/1
200 TABLET, FILM COATED ORAL DAILY
Qty: 180 TABLET | Refills: 1 | Status: SHIPPED | OUTPATIENT
Start: 2020-06-01 | End: 2020-11-23 | Stop reason: SDUPTHER

## 2020-06-01 RX ORDER — TRAZODONE HYDROCHLORIDE 50 MG/1
TABLET ORAL
Qty: 90 TABLET | Refills: 1 | Status: SHIPPED | OUTPATIENT
Start: 2020-06-01 | End: 2020-11-23 | Stop reason: SDUPTHER

## 2020-06-01 RX ORDER — ALBUTEROL SULFATE 2.5 MG/3ML
SOLUTION RESPIRATORY (INHALATION)
Qty: 750 ML | Refills: 1 | Status: SHIPPED | OUTPATIENT
Start: 2020-06-01 | End: 2021-02-17 | Stop reason: SDUPTHER

## 2020-06-01 RX ORDER — BENZONATATE 200 MG/1
CAPSULE ORAL
Qty: 30 CAPSULE | Refills: 5 | Status: SHIPPED | OUTPATIENT
Start: 2020-06-01 | End: 2020-10-28 | Stop reason: SDUPTHER

## 2020-06-01 RX ORDER — ALBUTEROL SULFATE 90 UG/1
2 AEROSOL, METERED RESPIRATORY (INHALATION) EVERY 6 HOURS PRN
Qty: 1 INHALER | Refills: 2 | Status: SHIPPED | OUTPATIENT
Start: 2020-06-01 | End: 2021-11-02 | Stop reason: SDUPTHER

## 2020-06-01 RX ORDER — NADOLOL 20 MG/1
20 TABLET ORAL DAILY
Qty: 90 TABLET | Refills: 1 | Status: SHIPPED | OUTPATIENT
Start: 2020-06-01 | End: 2020-11-23 | Stop reason: SDUPTHER

## 2020-07-02 ENCOUNTER — OFFICE VISIT (OUTPATIENT)
Dept: ORTHOPEDIC SURGERY | Age: 65
End: 2020-07-02
Payer: MEDICARE

## 2020-07-02 VITALS — BODY MASS INDEX: 33.97 KG/M2 | WEIGHT: 199 LBS | HEIGHT: 64 IN | RESPIRATION RATE: 17 BRPM

## 2020-07-02 PROBLEM — M25.532 LEFT WRIST PAIN: Status: ACTIVE | Noted: 2020-07-02

## 2020-07-02 PROBLEM — M77.8 LEFT WRIST TENDINITIS: Status: ACTIVE | Noted: 2020-07-02

## 2020-07-02 PROCEDURE — 1036F TOBACCO NON-USER: CPT | Performed by: ORTHOPAEDIC SURGERY

## 2020-07-02 PROCEDURE — 99213 OFFICE O/P EST LOW 20 MIN: CPT | Performed by: ORTHOPAEDIC SURGERY

## 2020-07-02 PROCEDURE — 3017F COLORECTAL CA SCREEN DOC REV: CPT | Performed by: ORTHOPAEDIC SURGERY

## 2020-07-02 PROCEDURE — 1090F PRES/ABSN URINE INCON ASSESS: CPT | Performed by: ORTHOPAEDIC SURGERY

## 2020-07-02 PROCEDURE — G8417 CALC BMI ABV UP PARAM F/U: HCPCS | Performed by: ORTHOPAEDIC SURGERY

## 2020-07-02 PROCEDURE — 4040F PNEUMOC VAC/ADMIN/RCVD: CPT | Performed by: ORTHOPAEDIC SURGERY

## 2020-07-02 PROCEDURE — L3908 WHO COCK-UP NONMOLDE PRE OTS: HCPCS | Performed by: ORTHOPAEDIC SURGERY

## 2020-07-02 PROCEDURE — G8400 PT W/DXA NO RESULTS DOC: HCPCS | Performed by: ORTHOPAEDIC SURGERY

## 2020-07-02 PROCEDURE — G8427 DOCREV CUR MEDS BY ELIG CLIN: HCPCS | Performed by: ORTHOPAEDIC SURGERY

## 2020-07-02 PROCEDURE — 1123F ACP DISCUSS/DSCN MKR DOCD: CPT | Performed by: ORTHOPAEDIC SURGERY

## 2020-07-02 RX ORDER — IBUPROFEN 600 MG/1
600 TABLET ORAL EVERY 6 HOURS PRN
Qty: 60 TABLET | Refills: 1 | Status: SHIPPED | OUTPATIENT
Start: 2020-07-02 | End: 2020-11-23

## 2020-07-02 NOTE — PROGRESS NOTES
Chief Complaint:  Elbow Pain (CK LT ELBOW ULNAR NERVE DECOMP FROM 3/2)      History of Present of Illness: The patient returns for ongoing follow-up and is now 4 months out from her left ulnar nerve decompression at the elbow. Shortly after surgery she had been noticing some improvement in the radiating discomfort down the arm and sensation to the small finger. However she continues to have some discomfort down to the ulnar aspect of the wrist and with residual tingling to the small finger. More recently she has been noticing more of a discomfort along the ulnar wrist.  She states that this has been present for some time but with increasing activities the ulnar wrist pain has been much more bothersome. Review of Systems  Pertinent items are noted in HPI  Denies fever, chills, confusion, bowel/bladder active change. Review of systems reviewed from Patient History Form dated on 1/10/2020 and available in the patient's chart under the Media tab. Examination:  On exam today the incision over the medial elbow is nicely healed. She demonstrates a full range of motion of the elbow and in the fingers. There is actually excellent firing of the intrinsics without any apparent weakness today. She does have some subjective tingling in the small finger but provocative testing for carpal tunnel syndrome or ulnar neuropathy at the wrist is negative. Along the ulnar wrist there is point tenderness when I palpate both along the distal extensor carpi ulnaris at the ulnocarpal level and also along the region of the TFC. DRUJ remains stable and she has a full range of motion. There is no tendon instability. Radiology:     X-rays obtained and reviewed in office:  Views 3  Location left wrist  Impression Three views of the left wrist reveal no sign of fracture, no intercarpal dissociation, and satisfactory articular congruity.   Ulnar variance is neutral.    Orders Placed This Encounter   Procedures    XR WRIST [>50% of Time Spent on Counseling for ____] : Greater than 50% of the encounter time was spent on counseling for [unfilled] [Time Spent: ___ minutes] : I have spent [unfilled] minutes of face to face time with the patient be due to voice recognition transcription.

## 2020-07-23 ENCOUNTER — VIRTUAL VISIT (OUTPATIENT)
Dept: PULMONOLOGY | Age: 65
End: 2020-07-23
Payer: MEDICARE

## 2020-07-23 PROCEDURE — 99443 PR PHYS/QHP TELEPHONE EVALUATION 21-30 MIN: CPT | Performed by: INTERNAL MEDICINE

## 2020-07-23 NOTE — PROGRESS NOTES
Marcia Retana is a 77 y.o. female evaluated via telephone on 7/23/2020. Consent:  She and/or health care decision maker is aware that that she may receive a bill for this telephone service, depending on her insurance coverage, and has provided verbal consent to proceed: Yes      Documentation:  I communicated with the patient and/or health care decision maker about sinus congestion and a cough. Details of this discussion including any medical advice provided:   Mild intermittent dry cough. Worse in supine. Less SOB. No fever. She can complete ADLs    ASSESSMENT:  · Cryptogenic organizing pneumonia with a possible additional non-specific fibrotic process  · Chronic hypoxic respiratory failure -resolved  · Cough and Fever has been intermittently associated with her condition  · Restrictive lung disease  · Morbid obesity  · Likely WILFRIDO  · Chronic Rhinitis with PND  · Hyperglycemia  · Obesity  · Afib     PLAN:   · Her condition has been steroid responsive  · Completed Prednisone 40mg for 4-6 weeks, 30mg for 1 month, 20mg for 1 month, 15mg for 6 weeks, 10mg for 6 weeks, 5mg for 8 weeks, 2.5mg daily for 2 weeks and  2.5mg QOD for 4 weeks. Stopped Prednisone 5/2020  · Continue sinus regimen  · Continue Lasix  · PRN albuterol and albuterol nebs  · Mold remediated  · F/u in Nov with 1 year follow up Chest CT    I affirm this is a Patient Initiated Episode with a Patient who has not had a related appointment within my department in the past 7 days or scheduled within the next 24 hours.     Patient identification was verified at the start of the visit: Yes    Total Time: 21 min    Note: not billable if this call serves to triage the patient into an appointment for the relevant concern      FAHAD CALABRESE

## 2020-07-29 ENCOUNTER — TELEPHONE (OUTPATIENT)
Dept: MAMMOGRAPHY | Age: 65
End: 2020-07-29

## 2020-08-19 ENCOUNTER — TELEPHONE (OUTPATIENT)
Dept: MAMMOGRAPHY | Age: 65
End: 2020-08-19

## 2020-08-27 ENCOUNTER — VIRTUAL VISIT (OUTPATIENT)
Dept: FAMILY MEDICINE CLINIC | Age: 65
End: 2020-08-27
Payer: MEDICARE

## 2020-08-27 PROCEDURE — G8400 PT W/DXA NO RESULTS DOC: HCPCS | Performed by: FAMILY MEDICINE

## 2020-08-27 PROCEDURE — 1123F ACP DISCUSS/DSCN MKR DOCD: CPT | Performed by: FAMILY MEDICINE

## 2020-08-27 PROCEDURE — 3046F HEMOGLOBIN A1C LEVEL >9.0%: CPT | Performed by: FAMILY MEDICINE

## 2020-08-27 PROCEDURE — G8427 DOCREV CUR MEDS BY ELIG CLIN: HCPCS | Performed by: FAMILY MEDICINE

## 2020-08-27 PROCEDURE — 1090F PRES/ABSN URINE INCON ASSESS: CPT | Performed by: FAMILY MEDICINE

## 2020-08-27 PROCEDURE — 3017F COLORECTAL CA SCREEN DOC REV: CPT | Performed by: FAMILY MEDICINE

## 2020-08-27 PROCEDURE — 4040F PNEUMOC VAC/ADMIN/RCVD: CPT | Performed by: FAMILY MEDICINE

## 2020-08-27 PROCEDURE — 99214 OFFICE O/P EST MOD 30 MIN: CPT | Performed by: FAMILY MEDICINE

## 2020-08-27 PROCEDURE — 2022F DILAT RTA XM EVC RTNOPTHY: CPT | Performed by: FAMILY MEDICINE

## 2020-08-27 RX ORDER — PANTOPRAZOLE SODIUM 40 MG/1
40 TABLET, DELAYED RELEASE ORAL
Qty: 90 TABLET | Refills: 0 | Status: SHIPPED | OUTPATIENT
Start: 2020-08-27 | End: 2020-11-23 | Stop reason: SDUPTHER

## 2020-08-27 RX ORDER — POTASSIUM CHLORIDE 750 MG/1
20 TABLET, EXTENDED RELEASE ORAL DAILY
Qty: 180 TABLET | Refills: 0 | Status: SHIPPED | OUTPATIENT
Start: 2020-08-27 | End: 2020-11-23 | Stop reason: SDUPTHER

## 2020-08-27 ASSESSMENT — PATIENT HEALTH QUESTIONNAIRE - PHQ9
1. LITTLE INTEREST OR PLEASURE IN DOING THINGS: 0
SUM OF ALL RESPONSES TO PHQ QUESTIONS 1-9: 0
2. FEELING DOWN, DEPRESSED OR HOPELESS: 0
SUM OF ALL RESPONSES TO PHQ QUESTIONS 1-9: 0
SUM OF ALL RESPONSES TO PHQ9 QUESTIONS 1 & 2: 0

## 2020-08-27 ASSESSMENT — ENCOUNTER SYMPTOMS
RESPIRATORY NEGATIVE: 1
GASTROINTESTINAL NEGATIVE: 1
ALLERGIC/IMMUNOLOGIC NEGATIVE: 1
EYES NEGATIVE: 1

## 2020-08-27 NOTE — PROGRESS NOTES
2020    TELEHEALTH EVALUATION -- Audio/Visual (During TZDEP-10 public health emergency)    HPI:    Rommel Mclaughlin (:  1955) has requested an audio/video evaluation for the following concern(s):    Follow up of diabetes and hypertension. Diabetic Review of Systems - medication compliance: compliant all of the time, diabetic diet compliance: compliant most of the time,  home glucose monitoring: is performed regularly, fasting values range 100-120, further diabetic ROS: no polyuria or polydipsia, no chest pain, dyspnea or TIA's, no numbness, tingling or pain in extremities. Depression: Patient complains of depression. She complains of anhedonia, depressed mood, insomnia and anxiety. Onset was approximately several years ago, controlled since that time. She denies current suicidal and homicidal plan or intent. Family history significant for no psychiatric illness. Possible organic causes contributing are: none. Risk factors: coronavirus pandemic Previous treatment includes Ativan, Zoloft and Remeron. She complains of the following side effects from the treatment: none. GERD: Patient complains of heartburn. This has been associated with belching. She denies early satiety. Symptoms have been present for several years. She denies dysphagia. She has not lost weight. She denies melena, hematochezia, hematemesis, and coffee ground emesis. Medical therapy in the past has included proton pump inhibitors. Having difficulty swallowing the Klor-con 20 mEq. Wants a small tab. Review of Systems   Constitutional: Negative. HENT: Negative. Eyes: Negative. Respiratory: Negative. Cardiovascular: Negative. Gastrointestinal: Negative. Endocrine: Negative. Genitourinary: Negative. Musculoskeletal: Negative. Skin: Negative. Allergic/Immunologic: Negative. Neurological: Negative. Hematological: Negative. Psychiatric/Behavioral: Negative.         Prior to Visit Medications    Medication Sig Taking? Authorizing Provider   ibuprofen (ADVIL;MOTRIN) 600 MG tablet Take 1 tablet by mouth every 6 hours as needed for Pain  Keysha Quigley MD   diclofenac sodium (VOLTAREN) 1 % GEL Apply 2 g topically 4 times daily  Keysha Quigley MD   SITagliptin (JANUVIA) 100 MG tablet Take 1 tablet by mouth daily  Silver Wilhelm MD   empagliflozin (JARDIANCE) 25 MG tablet Take 1 tablet by mouth daily  Silver Wilhelm MD   sertraline (ZOLOFT) 100 MG tablet Take 2 tablets by mouth daily  Silver Wilhelm MD   albuterol sulfate HFA (PROVENTIL HFA) 108 (90 Base) MCG/ACT inhaler Inhale 2 puffs into the lungs every 6 hours as needed for Wheezing  Silver Wilhelm MD   albuterol (PROVENTIL) (2.5 MG/3ML) 0.083% nebulizer solution INHALE 3 MLS BY NEBULIZATION EVERY 6 HOURS AS NEEDED FOR WHEEZING  Silver Wilhelm MD   levothyroxine (SYNTHROID) 150 MCG tablet Take 1 tablet by mouth Daily  Silver Wilhelm MD   nadolol (CORGARD) 20 MG tablet Take 1 tablet by mouth daily  Silver Wilhelm MD   LORazepam (ATIVAN) 0.5 MG tablet Take 1 tablet by mouth every 6 hours as needed for Anxiety for up to 180 days.   Silver Wilhelm MD   furosemide (LASIX) 40 MG tablet TAKE 1 TABLET BY MOUTH EVERY DAY  Silver Wilhelm MD   potassium chloride (KLOR-CON M20) 20 MEQ extended release tablet TAKE 1 TABLET BY MOUTH EVERY DAY  Silver Wilhelm MD   mirtazapine (REMERON) 30 MG tablet Take 1 tablet by mouth nightly  Silver Wilhelm MD   benzonatate (TESSALON) 200 MG capsule TAKE 1 CAPSULE BY MOUTH 3 TIMES A DAY AS NEEDED FOR COUGH  Silver Wilhelm MD   traZODone (DESYREL) 50 MG tablet TAKE 1 TABLET BY MOUTH EVERY DAY AT NIGHT  Silver Wilhelm MD   amitriptyline (ELAVIL) 25 MG tablet TAKE 1 TABLET BY MOUTH EVERY DAY AT NIGHT  Silver Wilhelm MD   INSULIN SYRINGE 1CC/29G 29G X 1/2\" 1 ML MISC 1 each by Does not apply route 2 times daily  Silver Wilhelm MD   ibuprofen (ADVIL;MOTRIN) 600 MG tablet Take 1 tablet by mouth every 6 hours as needed for Pain  Edwin Tee MD   FLUTICASONE FUROATE IN Inhale into the lungs as needed  Historical Provider, MD   Multiple Vitamins-Minerals (MULTIVITAMIN ADULT PO) Take by mouth daily  Historical Provider, MD   hydrOXYzine (ATARAX) 10 MG tablet TAKE 1 TO 3 TABLETS BY MOUTH 3 TIMES DAILY AS NEEDED FOR ITCHING  Giulia Sen MD   blood glucose test strips (ONETOUCH VERIO) strip 1 each by In Vitro route 2 times daily As needed. Giulia Sen MD   blood glucose test strips [facility]) strip 1 each by In Vitro route daily As needed. Giulia Sen MD   OXYGEN Inhale 3 L into the lungs  Historical Provider, MD   Esomeprazole Magnesium (NEXIUM 24HR PO) Take by mouth daily   Historical Provider, MD       Social History     Tobacco Use    Smoking status: Never Smoker    Smokeless tobacco: Never Used   Substance Use Topics    Alcohol use: No    Drug use: No        Allergies   Allergen Reactions    Penicillins Anaphylaxis     Tolerates Meropenem.  Cefuroxime      Tolerates Meropenem.  Doxycycline Hives    Imitrex [Sumatriptan] Other (See Comments)     Feels like pins and needles    Meperidine     Sulfa Antibiotics Hives    Keflex [Cephalexin] Itching and Rash     Tolerates Meropenem.  Tape Anthonette Jackie Tape] Rash   ,   Past Medical History:   Diagnosis Date    A-fib ([facility] [address]     Anxiety     Cryptogenic organizing pneumonia ([facility] [address]     Depression     Diabetes mellitus ([facility] [address]     GERD (gastroesophageal reflux disease)     Hyperlipidemia     Hypertension     On home oxygen therapy     uses O2 3L prn    Rash     Thyroid disease     hypothyroidism   ,   Past Surgical History:   Procedure Laterality Date    ARM SURGERY Left 3/2/2020    LEFT ULNAR NERVE DECOMPRESSION AT THE ELBOW performed by Edwin Tee MD at [facility] 6/27/2019    EBUS WF W/ANES.  performed by Raza Rich MD at [address] [facility]  6/27/2019 [] Abnormal-       Neurological:        [x] No Facial Asymmetry (Cranial nerve 7 motor function) (limited exam to video visit)          [x] No gaze palsy        [] Abnormal-         Skin:        [x] No significant exanthematous lesions or discoloration noted on facial skin         [] Abnormal-            Psychiatric:       [x] Normal Affect [x] No Hallucinations        [] Abnormal-          ASSESSMENT/PLAN:  1. Type 2 diabetes mellitus without complication, without long-term current use of insulin (HCC)  Controlled. Continue current medications. 2. Essential hypertension  Asymptomatic    3. Gastroesophageal reflux disease without esophagitis  Protonix 40 mg daily    4. Mild single current episode of major depressive disorder ([facility] [address]  Continue medications-controlled    5. Chronic obstructive pulmonary disease, unspecified COPD type (HCC)  Controlled no change    6. Paroxysmal atrial fibrillation (HCC)  Controlled, asymptomatic      Return in about 3 months (around 11/27/2020) for diabetes, HTN. Vaughn Perrin is a 77 y.o. female being evaluated by a Virtual Visit (video visit) encounter to address concerns as mentioned above. A caregiver was present when appropriate. Due to this being a TeleHealth encounter (During [facility]- public health emergency), evaluation of the following organ systems was limited: Vitals/Constitutional/EENT/Resp/CV/GI//MS/Neuro/Skin/Heme-Lymph-Imm. Pursuant to the emergency declaration under the [facility], [address] [place] [address] authority and the [employer] and [employer] Act, this Virtual Visit was conducted with patient's (and/or legal guardian's) consent, to reduce the patient's risk of exposure to COVID-19 and provide necessary medical care.   The patient (and/or legal guardian) has also been advised to contact this office for worsening conditions or problems, and seek emergency medical treatment and/or call 911 if deemed necessary. Patient identification was verified at the start of the visit: Yes    Total time spent on this encounter: Not billed by time    Services were provided through a video synchronous discussion virtually to substitute for in-person clinic visit. Patient and provider were located at their individual homes. --Alisia Golden MD on 8/27/2020 at 11:15 AM    An electronic signature was used to authenticate this note.

## 2020-08-28 ENCOUNTER — TELEPHONE (OUTPATIENT)
Dept: FAMILY MEDICINE CLINIC | Age: 65
End: 2020-08-28

## 2020-08-28 NOTE — TELEPHONE ENCOUNTER
[facility]ia faxed medication clarification. They are asking if potassium is 1 tab daily or 2 tabs daily. Both directions are on the order.

## 2020-09-09 ENCOUNTER — TELEPHONE (OUTPATIENT)
Dept: MAMMOGRAPHY | Age: 65
End: 2020-09-09

## 2020-09-27 ENCOUNTER — APPOINTMENT (OUTPATIENT)
Dept: GENERAL RADIOLOGY | Age: 65
End: 2020-09-27
Payer: MEDICARE

## 2020-09-27 ENCOUNTER — HOSPITAL ENCOUNTER (EMERGENCY)
Age: 65
Discharge: HOME | End: 2020-09-28
Attending: EMERGENCY MEDICINE
Payer: MEDICARE

## 2020-09-27 LAB
A/G RATIO: 1 (ref 1.1–2.2)
ALBUMIN SERPL-MCNC: 3.8 G/DL (ref 3.4–5)
ALP BLD-CCNC: 114 U/L (ref 40–129)
ALT SERPL-CCNC: 22 U/L (ref 10–40)
ANION GAP SERPL CALCULATED.3IONS-SCNC: 11 MMOL/L (ref 3–16)
AST SERPL-CCNC: 51 U/L (ref 15–37)
BASE EXCESS VENOUS: 0.4 MMOL/L (ref -3–3)
BASOPHILS ABSOLUTE: 0.3 K/UL (ref 0–0.2)
BASOPHILS RELATIVE PERCENT: 3.2 %
BILIRUB SERPL-MCNC: 0.3 MG/DL (ref 0–1)
BUN BLDV-MCNC: 9 MG/DL (ref 7–20)
CALCIUM SERPL-MCNC: 9.1 MG/DL (ref 8.3–10.6)
CARBOXYHEMOGLOBIN: 2.9 % (ref 0–1.5)
CHLORIDE BLD-SCNC: 103 MMOL/L (ref 99–110)
CO2: 27 MMOL/L (ref 21–32)
CREAT SERPL-MCNC: 0.7 MG/DL (ref 0.6–1.2)
EOSINOPHILS ABSOLUTE: 0.5 K/UL (ref 0–0.6)
EOSINOPHILS RELATIVE PERCENT: 5.3 %
GFR AFRICAN AMERICAN: >60
GFR NON-AFRICAN AMERICAN: >60
GLOBULIN: 3.9 G/DL
GLUCOSE BLD-MCNC: 103 MG/DL (ref 70–99)
HCO3 VENOUS: 24.8 MMOL/L (ref 23–29)
HCT VFR BLD CALC: 39.2 % (ref 36–48)
HEMOGLOBIN: 13 G/DL (ref 12–16)
LYMPHOCYTES ABSOLUTE: 1.8 K/UL (ref 1–5.1)
LYMPHOCYTES RELATIVE PERCENT: 18.1 %
MCH RBC QN AUTO: 26.6 PG (ref 26–34)
MCHC RBC AUTO-ENTMCNC: 33.1 G/DL (ref 31–36)
MCV RBC AUTO: 80.5 FL (ref 80–100)
METHEMOGLOBIN VENOUS: 0.3 %
MONOCYTES ABSOLUTE: 0.6 K/UL (ref 0–1.3)
MONOCYTES RELATIVE PERCENT: 6 %
NEUTROPHILS ABSOLUTE: 6.6 K/UL (ref 1.7–7.7)
NEUTROPHILS RELATIVE PERCENT: 67.4 %
O2 CONTENT, VEN: 15 VOL %
O2 SAT, VEN: 80 %
O2 THERAPY: ABNORMAL
PCO2, VEN: 39.2 MMHG (ref 40–50)
PDW BLD-RTO: 16.4 % (ref 12.4–15.4)
PH VENOUS: 7.42 (ref 7.35–7.45)
PLATELET # BLD: 182 K/UL (ref 135–450)
PMV BLD AUTO: 7.6 FL (ref 5–10.5)
PO2, VEN: 43.2 MMHG (ref 25–40)
POTASSIUM REFLEX MAGNESIUM: 3.7 MMOL/L (ref 3.5–5.1)
PRO-BNP: 266 PG/ML (ref 0–124)
RBC # BLD: 4.87 M/UL (ref 4–5.2)
SODIUM BLD-SCNC: 141 MMOL/L (ref 136–145)
TCO2 CALC VENOUS: 26 MMOL/L
TOTAL PROTEIN: 7.7 G/DL (ref 6.4–8.2)
TROPONIN: <0.01 NG/ML
WBC # BLD: 9.7 K/UL (ref 4–11)

## 2020-09-27 PROCEDURE — 82803 BLOOD GASES ANY COMBINATION: CPT

## 2020-09-27 PROCEDURE — 71045 X-RAY EXAM CHEST 1 VIEW: CPT

## 2020-09-27 PROCEDURE — 84484 ASSAY OF TROPONIN QUANT: CPT

## 2020-09-27 PROCEDURE — 85379 FIBRIN DEGRADATION QUANT: CPT

## 2020-09-27 PROCEDURE — 80053 COMPREHEN METABOLIC PANEL: CPT

## 2020-09-27 PROCEDURE — 93005 ELECTROCARDIOGRAM TRACING: CPT | Performed by: EMERGENCY MEDICINE

## 2020-09-27 PROCEDURE — 85025 COMPLETE CBC W/AUTO DIFF WBC: CPT

## 2020-09-27 PROCEDURE — 36415 COLL VENOUS BLD VENIPUNCTURE: CPT

## 2020-09-27 PROCEDURE — 83880 ASSAY OF NATRIURETIC PEPTIDE: CPT

## 2020-09-27 PROCEDURE — 99285 EMERGENCY DEPT VISIT HI MDM: CPT

## 2020-09-28 ENCOUNTER — APPOINTMENT (OUTPATIENT)
Dept: CT IMAGING | Age: 65
End: 2020-09-28
Payer: MEDICARE

## 2020-09-28 ENCOUNTER — TELEPHONE (OUTPATIENT)
Dept: PULMONOLOGY | Age: 65
End: 2020-09-28

## 2020-09-28 VITALS
BODY MASS INDEX: 35.26 KG/M2 | HEIGHT: 63 IN | RESPIRATION RATE: 20 BRPM | TEMPERATURE: 98.3 F | WEIGHT: 199 LBS | DIASTOLIC BLOOD PRESSURE: 74 MMHG | SYSTOLIC BLOOD PRESSURE: 130 MMHG | HEART RATE: 88 BPM | OXYGEN SATURATION: 97 %

## 2020-09-28 LAB
D DIMER: 572 NG/ML DDU (ref 0–229)
EKG ATRIAL RATE: 99 BPM
EKG DIAGNOSIS: NORMAL
EKG P AXIS: 27 DEGREES
EKG P-R INTERVAL: 148 MS
EKG Q-T INTERVAL: 370 MS
EKG QRS DURATION: 74 MS
EKG QTC CALCULATION (BAZETT): 474 MS
EKG R AXIS: 18 DEGREES
EKG T AXIS: 6 DEGREES
EKG VENTRICULAR RATE: 99 BPM

## 2020-09-28 PROCEDURE — 6370000000 HC RX 637 (ALT 250 FOR IP): Performed by: EMERGENCY MEDICINE

## 2020-09-28 PROCEDURE — 71260 CT THORAX DX C+: CPT

## 2020-09-28 PROCEDURE — 6360000004 HC RX CONTRAST MEDICATION: Performed by: EMERGENCY MEDICINE

## 2020-09-28 PROCEDURE — 93010 ELECTROCARDIOGRAM REPORT: CPT | Performed by: INTERNAL MEDICINE

## 2020-09-28 RX ORDER — BENZONATATE 100 MG/1
100 CAPSULE ORAL ONCE
Status: COMPLETED | OUTPATIENT
Start: 2020-09-28 | End: 2020-09-28

## 2020-09-28 RX ORDER — BENZONATATE 100 MG/1
100 CAPSULE ORAL 2 TIMES DAILY PRN
Qty: 14 CAPSULE | Refills: 0 | Status: SHIPPED | OUTPATIENT
Start: 2020-09-28 | End: 2020-10-05

## 2020-09-28 RX ORDER — IPRATROPIUM BROMIDE AND ALBUTEROL SULFATE 2.5; .5 MG/3ML; MG/3ML
1 SOLUTION RESPIRATORY (INHALATION) ONCE
Status: COMPLETED | OUTPATIENT
Start: 2020-09-28 | End: 2020-09-28

## 2020-09-28 RX ORDER — PREDNISONE 20 MG/1
40 TABLET ORAL ONCE
Status: COMPLETED | OUTPATIENT
Start: 2020-09-28 | End: 2020-09-28

## 2020-09-28 RX ORDER — PREDNISONE 50 MG/1
50 TABLET ORAL DAILY
Qty: 5 TABLET | Refills: 0 | Status: SHIPPED | OUTPATIENT
Start: 2020-09-28 | End: 2020-10-03

## 2020-09-28 RX ORDER — AZITHROMYCIN 250 MG/1
500 TABLET, FILM COATED ORAL ONCE
Status: COMPLETED | OUTPATIENT
Start: 2020-09-28 | End: 2020-09-28

## 2020-09-28 RX ORDER — AZITHROMYCIN 250 MG/1
250 TABLET, FILM COATED ORAL SEE ADMIN INSTRUCTIONS
Qty: 6 TABLET | Refills: 0 | Status: SHIPPED | OUTPATIENT
Start: 2020-09-28 | End: 2020-10-03

## 2020-09-28 RX ADMIN — BENZONATATE 100 MG: 100 CAPSULE ORAL at 00:23

## 2020-09-28 RX ADMIN — IOPAMIDOL 75 ML: 755 INJECTION, SOLUTION INTRAVENOUS at 01:02

## 2020-09-28 RX ADMIN — PREDNISONE 40 MG: 20 TABLET ORAL at 02:11

## 2020-09-28 RX ADMIN — AZITHROMYCIN 500 MG: 250 TABLET, FILM COATED ORAL at 02:11

## 2020-09-28 RX ADMIN — IPRATROPIUM BROMIDE AND ALBUTEROL SULFATE 1 AMPULE: .5; 3 SOLUTION RESPIRATORY (INHALATION) at 00:23

## 2020-09-28 ASSESSMENT — ENCOUNTER SYMPTOMS
PHOTOPHOBIA: 0
COUGH: 1
RHINORRHEA: 0
DIARRHEA: 0
BACK PAIN: 0
ABDOMINAL DISTENTION: 0
SHORTNESS OF BREATH: 1
NAUSEA: 0
VOMITING: 0
WHEEZING: 0

## 2020-09-28 NOTE — TELEPHONE ENCOUNTER
Pt called back and was notified of Dr Briones Reasoner response. Pt states that she does not have a fever and will get Z pack and prednisone filled.  Watch for addendum

## 2020-09-28 NOTE — TELEPHONE ENCOUNTER
If th pt has a fever she should be tested for COVID 19.  If she does not improve on the zpack and prednisone then she should call back in 2-3 days

## 2020-09-28 NOTE — ED NOTES
Ambulate from room 6 to past room 8 and return to room 6 with O2 at 2L/NC. O2 sat decreased to 88% and returned to 96% in <1 minutes.       Deborah Lorenz RN  09/28/20 [id number]

## 2020-09-28 NOTE — TELEPHONE ENCOUNTER
Patient called with message left for patient to call back to office. [facility] rad watch for addendum.

## 2020-09-28 NOTE — ED PROVIDER NOTES
Emergency Department Provider Note  Location: [address] EMERGENCY DEPARTMENT  9/27/2020     Patient Identification  Kwaku May is a 77 y.o. female    Chief Complaint  Shortness of Breath (dyspnea and short of breath with rest and activity. )          HPI  (History provided by patient)  73-year-old female history of cryptogenic organizing pneumonia with fibrotic lung disease followed by Dr. Noa Noe pulmonology who presents with persistent cough and worsening shortness of breath over the past week. Patient uses O2 at home as needed. She reports that her shortness of breath is substantially worse than normal.  States that she has a dry hacking cough that is not fully relieved with breathing treatments and her normal medications. She denies any sputum production no fever no chills no known exposures to COVID-19 or sick contacts. She denies any chest pain back pain shoulder pain no diaphoresis nausea or other autonomic symptoms. No unilateral leg pain or swelling or history of DVT or PE. I have reviewed the following nursing documentation:  Allergies: Allergies   Allergen Reactions    Penicillins Anaphylaxis     Tolerates Meropenem.  Cefuroxime      Tolerates Meropenem.  Doxycycline Hives    Imitrex [Sumatriptan] Other (See Comments)     Feels like pins and needles    Meperidine     Sulfa Antibiotics Hives    Keflex [Cephalexin] Itching and Rash     Tolerates Meropenem.  Tape Clarnce Gell Tape] Rash       Past medical history:  has a past medical history of A-fib ([address] Anxiety, Cryptogenic organizing pneumonia ([facility] [address] Depression, GERD (gastroesophageal reflux disease), Hyperlipidemia, On home oxygen therapy, Rash, and Thyroid disease.     Past surgical history:  has a past surgical history that includes Cholecystectomy; bronchoscopy (N/A, 6/27/2019); bronchoscopy (6/27/2019); bronchoscopy (6/27/2019); bronchoscopy (6/27/2019); bronchoscopy (07/10/2019); bronchoscopy (N/A, 7/10/2019); Hysterectomy, total abdominal; bronchoscopy (Bilateral, 08/06/2019); bronchoscopy (N/A, 8/6/2019); bronchoscopy (N/A, 12/24/2019); and Arm Surgery (Left, 3/2/2020). Home medications:   Prior to Admission medications    Medication Sig Start Date End Date Taking?  Authorizing Provider   azithromycin (ZITHROMAX) 250 MG tablet Take 1 tablet by mouth See Admin Instructions for 5 days 500mg on day 1 followed by 250mg on days 2 - 5 9/28/20 10/3/20 Yes Denice Rose MD   predniSONE (DELTASONE) 50 MG tablet Take 1 tablet by mouth daily for 5 days 9/28/20 10/3/20 Yes Denice Rose MD   pantoprazole (PROTONIX) 40 MG tablet Take 1 tablet by mouth every morning (before breakfast) 8/27/20  Yes Dominique Bianchi MD   potassium chloride (KLOR-CON M) 10 MEQ extended release tablet Take 2 tablets by mouth daily TAKE 1 TABLET BY MOUTH EVERY DAY 8/27/20  Yes Dominique Bianchi MD   ibuprofen (ADVIL;MOTRIN) 600 MG tablet Take 1 tablet by mouth every 6 hours as needed for Pain 7/2/20  Yes Matt Bolaños MD   diclofenac sodium (VOLTAREN) 1 % GEL Apply 2 g topically 4 times daily 7/2/20  Yes Matt Bolaños MD   SITagliptin (JANUVIA) 100 MG tablet Take 1 tablet by mouth daily 6/1/20  Yes Dominique Bianchi MD   empagliflozin (JARDIANCE) 25 MG tablet Take 1 tablet by mouth daily 6/1/20  Yes Dominique Bianchi MD   sertraline (ZOLOFT) 100 MG tablet Take 2 tablets by mouth daily 6/1/20  Yes Dominique Bianchi MD   albuterol sulfate HFA (PROVENTIL HFA) 108 (90 Base) MCG/ACT inhaler Inhale 2 puffs into the lungs every 6 hours as needed for Wheezing 6/1/20  Yes Dominique Bianchi MD   albuterol (PROVENTIL) (2.5 MG/3ML) 0.083% nebulizer solution INHALE 3 MLS BY NEBULIZATION EVERY 6 HOURS AS NEEDED FOR WHEEZING 6/1/20  Yes Dominique Bianchi MD   levothyroxine (SYNTHROID) 150 MCG tablet Take 1 tablet by mouth Daily 6/1/20  Yes Dominique Bianchi MD   nadolol (CORGARD) 20 MG tablet Take 1 tablet by mouth daily 6/1/20  Yes Dominique Bianchi MD   LORazepam (ATIVAN) 0.5 MG tablet Take 1 tablet by mouth every 6 hours as needed for Anxiety for up to 180 days. 6/1/20 11/28/20 Yes Omid Adams MD   furosemide (LASIX) 40 MG tablet TAKE 1 TABLET BY MOUTH EVERY DAY 6/1/20  Yes Omid Adams MD   mirtazapine (REMERON) 30 MG tablet Take 1 tablet by mouth nightly 6/1/20  Yes Omid Adams MD   benzonatate (TESSALON) 200 MG capsule TAKE 1 CAPSULE BY MOUTH 3 TIMES A DAY AS NEEDED FOR COUGH 6/1/20  Yes Omid Adams MD   traZODone (DESYREL) 50 MG tablet TAKE 1 TABLET BY MOUTH EVERY DAY AT NIGHT 6/1/20  Yes Omid Adams MD   amitriptyline (ELAVIL) 25 MG tablet TAKE 1 TABLET BY MOUTH EVERY DAY AT NIGHT 6/1/20  Yes Omid Adams MD   INSULIN SYRINGE 1CC/29G 29G X 1/2\" 1 ML MISC 1 each by Does not apply route 2 times daily 3/6/20  Yes Omid Adams MD   ibuprofen (ADVIL;MOTRIN) 600 MG tablet Take 1 tablet by mouth every 6 hours as needed for Pain 3/2/20  Yes Taye Agosto MD   FLUTICASONE FUROATE IN Inhale into the lungs as needed   Yes Historical Provider, MD   Multiple Vitamins-Minerals (MULTIVITAMIN ADULT PO) Take by mouth daily   Yes Historical Provider, MD   hydrOXYzine (ATARAX) 10 MG tablet TAKE 1 TO 3 TABLETS BY MOUTH 3 TIMES DAILY AS NEEDED FOR ITCHING 2/18/20  Yes Omid Adams MD   blood glucose test strips (ONETOUCH VERIO) strip 1 each by In Vitro route 2 times daily As needed. 11/15/19  Yes Omid Adams MD   blood glucose test strips [facility] VERIO) strip 1 each by In Vitro route daily As needed. 10/15/19  Yes Omid Adams MD   OXYGEN Inhale 3 L into the lungs 7/14/19  Yes Historical Provider, MD   Esomeprazole Magnesium (NEXIUM 24HR PO) Take by mouth daily    Yes Historical Provider, MD       Social history:  reports that she has never smoked. She has never used smokeless tobacco. She reports that she does not drink alcohol or use drugs.     Family history:    Family History   Problem Relation Age of Onset    Diabetes Mother     High Blood Pressure Mother     Asthma Sister    [facility] Other Father          ROS  Review of Systems   Constitutional: Negative for chills and fever. HENT: Negative for congestion and rhinorrhea. Eyes: Negative for photophobia and visual disturbance. Respiratory: Positive for cough and shortness of breath. Negative for wheezing. Cardiovascular: Negative for chest pain and palpitations. Gastrointestinal: Negative for abdominal distention, diarrhea, nausea and vomiting. Genitourinary: Negative for dysuria and hematuria. Musculoskeletal: Negative for back pain and neck pain. Skin: Negative for rash and wound. Neurological: Negative for syncope and weakness. Psychiatric/Behavioral: Negative for agitation and confusion. Exam  ED Triage Vitals [09/27/20 2239]   BP Temp Temp Source Pulse Resp SpO2 Height Weight   130/71 98.3 °F (36.8 °C) Oral 99 20 99 % 5' 3\" (1.6 m) 199 lb (90.3 kg)       Physical Exam  Vitals signs and nursing note reviewed. Constitutional:       General: She is not in acute distress. Appearance: She is well-developed. HENT:      Head: Normocephalic and atraumatic. Nose: Nose normal. No congestion. Eyes:      Extraocular Movements: Extraocular movements intact. Pupils: Pupils are equal, round, and reactive to light. Neck:      Musculoskeletal: Normal range of motion and neck supple. Cardiovascular:      Rate and Rhythm: Normal rate and regular rhythm. Heart sounds: No murmur. Comments: Equal radial pulses  Pulmonary:      Breath sounds: Normal breath sounds. Comments: Mildly increased work of breathing, borderline tachypnea, clear breath sounds bilaterally. Abdominal:      General: There is no distension. Palpations: Abdomen is soft. Tenderness: There is no abdominal tenderness. Musculoskeletal: Normal range of motion. General: No deformity. Skin:     General: Skin is warm. Findings: No rash.    Neurological:      Mental Status: She is alert and oriented to person, place, and time. Motor: No abnormal muscle tone. Coordination: Coordination normal.   Psychiatric:         Mood and Affect: Mood normal.         Behavior: Behavior normal.           ED Course    ED Medication Orders (From admission, onward)    Start Ordered     Status Ordering Provider    09/28/20 0215 09/28/20 0156  azithromycin (ZITHROMAX) tablet 500 mg  ONCE      Ordered GIGI BUSTOS    09/28/20 0215 09/28/20 0156  predniSONE (DELTASONE) tablet 40 mg  ONCE      Ordered GIGI BUSTOS     09/28/20 0101 09/28/20 0101  iopamidol (ISOVUE-370) 76 % injection 75 mL  IMG ONCE PRN      Last MAR action:  Given - by Julian Swann on 09/28/20 at [facility]    09/28/20 0045 09/28/20 0015  ipratropium-albuterol (DUONEB) nebulizer solution 1 ampule  ONCE      Last MAR action:  Given - by Naomi Bautista on 09/28/20 at [address] [place] [address]    09/28/20 0045 09/28/20 0015  benzonatate (TESSALON) capsule 100 mg  ONCE      Last MAR action:  Given - by Naomi Bautista on 09/28/20 at [address] [place] [address]          EKG  Normal sinus rhythm rate 100, normal axis, QTC prolongation 474 otherwise normal intervals borderline right bundle branch block pattern, no diagnostic ischemic changes noted, increased T wave inversion noted in lead III. Radiology  Xr Chest Portable    Result Date: 9/27/2020  EXAMINATION: ONE XRAY VIEW OF THE CHEST 9/27/2020 11:16 pm COMPARISON: Chest radiograph performed May 29, 2020. HISTORY: ORDERING SYSTEM PROVIDED HISTORY: SOB TECHNOLOGIST PROVIDED HISTORY: Reason for exam:-> SOB Reason for Exam: sob, cough Acuity: Acute Type of Exam: Initial Relevant Medical/Surgical History: pneumonia, bronch, afib FINDINGS: Coarse lung markings noted bilaterally, similar to prior examination. No focal consolidation, pleural effusion, or pneumothorax. Stable cardiomediastinal silhouette.   Atherosclerotic changes of the thoracic aorta. No acute osseous abnormality. No acute cardiopulmonary findings. Ct Chest Pulmonary Embolism W Contrast    Result Date: 9/28/2020  EXAMINATION: CTA OF THE CHEST 9/28/2020 1:02 am TECHNIQUE: CTA of the chest was performed after the administration of intravenous contrast.  Multiplanar reformatted images are provided for review. MIP images are provided for review. Dose modulation, iterative reconstruction, and/or weight based adjustment of the mA/kV was utilized to reduce the radiation dose to as low as reasonably achievable. COMPARISON: Chest x-ray dated September 27, 2020 HISTORY: ORDERING SYSTEM PROVIDED HISTORY: SOB TECHNOLOGIST PROVIDED HISTORY: Reason for exam:->SOB Reason for Exam: sob, elevated ddimer Acuity: Acute Type of Exam: Initial FINDINGS: Pulmonary Arteries: Pulmonary arteries are adequately opacified for evaluation. No evidence of intraluminal filling defect to suggest pulmonary embolism. Main pulmonary artery is normal in caliber. Mediastinum: Mediastinal and hilar lymphadenopathy is present. Largest AP window lymph node measures 1.4 x 1.1 cm. Largest subcarinal lymph node measures 1.5 x 1.4 cm. Largest right hilar lymph node measures 1.7 x 0.8 cm. Largest left hilar lymph node measures 1.2 by 0.8 cm. .  The heart and pericardium demonstrate no acute abnormality. There is no acute abnormality of the thoracic aorta. Lungs/pleura: A mild-to-moderate degree of interstitial pulmonary fibrosis is noted. Scattered areas of ground-glass opacity are seen bilaterally. Differential considerations would include atypical pattern of edema, atypical/viral pneumonia, versus an acute interstitial pneumonitis. No pleural effusion or pneumothorax is present. Upper Abdomen: Limited images of the upper abdomen are unremarkable. Soft Tissues/Bones: No acute bone or soft tissue abnormality. 1. No evidence of pulmonary embolus 2.  Scattered ground-glass opacities bilaterally, with evidence of 100.0 fL    MCH 26.6 26.0 - 34.0 pg    MCHC 33.1 31.0 - 36.0 g/dL    RDW 16.4 (H) 12.4 - 15.4 %    Platelets 989 842 - 257 K/uL    MPV 7.6 5.0 - 10.5 fL    Neutrophils % 67.4 %    Lymphocytes % 18.1 %    Monocytes % 6.0 %    Eosinophils % 5.3 %    Basophils % 3.2 %    Neutrophils Absolute 6.6 1.7 - 7.7 K/uL    Lymphocytes Absolute 1.8 1.0 - 5.1 K/uL    Monocytes Absolute 0.6 0.0 - 1.3 K/uL    Eosinophils Absolute 0.5 0.0 - 0.6 K/uL    Basophils Absolute 0.3 (H) 0.0 - 0.2 K/uL   D-dimer, quantitative   Result Value Ref Range    D-Dimer, Quant 572 (H) 0 - 229 ng/mL DDU         [facility]  Patient seen and evaluated. Relevant records reviewed. This is a 23-year-old female with underlying restrictive lung disease secondary to cryptogenic pneumonia followed by pulmonology who presents with dry hacking cough and associated increased dyspnea over the last week. No sputum production no fever chills and she has been at home with no exposures to sick contacts or COVID-19. On exam she is overall well-appearing she is in no significant respiratory distress speaking in full sentences. She has overall clear breath sounds bilaterally. She is treated with 1 [employer] and [employer] and states that she feels improved. She was ambulated on her 2 L nasal cannula which she is on at home and did not desat below 88% and came back up to 99% within a few seconds. She states that she feels near her baseline. Her CT shows evidence of pulmonary fibrosis with nonspecific findings may be consistent with pneumonitis versus atypical pneumonia or other process an addition to mild hilar adenopathy. I reviewed previous CTs and they appear to be similar. Overall my index of suspicion for acute bacterial pneumonia is low. I also doubt COVID-19. Feel that this is likely exacerbation of her known underlying lung disease. I doubt ACS or other acute cardiopulmonary process that require emergent intervention at this point otherwise.   Patient does have poor pulmonary reserve and I did offer admission to the patient where she can be followed up by her pulmonologist in the morning however patient is adamant about going home and following up outpatient. I do feel that this is reasonable and I discussed return precautions at length with the patient which she is agreeable to expresses understanding of. She will follow-up later in the morning with pulmonology. Will give 1 dose of prednisone here as well as azithromycin and will give short prescription was for both. We will also give prescription for Tessalon Perles. I completed a structured, evidence-based clinical evaluation to screen for acute emergencies for the above complaints. Available evidence indicate that the patient is low risk and this is consistent with my clinical intuition. The risk of further workup or hospitalization is likely higher than the likelihood of the patient having a dangerous emergent condition. It is, therefore, in the patients best interest not to do additional emergent testing. At this point I do not feel the patient requires further work up and it is reasonable to discharge the patient. I had a discussion with the patient and/or their surrogate regarding diagnosis, diagnostic testing results, treatment/ plan of care, and follow up. Incidental findings of CT also discussed. There was shared decision-making between myself as well as the patient and/or their surrogate and we are all in agreement with discharge home. There was an opportunity for questions and all questions were answered to the best of my ability and to the satisfaction of the patient and/or patient family. Patient agreed to follow up with pulmonology for further evaluation/treatment. The patient was given strict return precautions as we discussed symptoms that would necessitate return to the ED. Patient will return to ED for new/worsening symptoms.  The patient verbalized their understanding and agreement with the above plan. Please refer to AVS for further details regarding discharge instructions. Clinical Impression:  1. Dyspnea, unspecified type    2. Cough          Disposition:  Discharge to home in stable condition. Blood pressure 128/68, pulse 96, temperature 98.3 °F (36.8 °C), temperature source Oral, resp. rate 20, height 5' 3\" (1.6 m), weight 199 lb (90.3 kg), SpO2 99 %, not currently breastfeeding. Patient was given scripts for the following medications. I counseled patient how to take these medications. New Prescriptions    AZITHROMYCIN (ZITHROMAX) 250 MG TABLET    Take 1 tablet by mouth See Admin Instructions for 5 days 500mg on day 1 followed by 250mg on days 2 - 5    PREDNISONE (DELTASONE) 50 MG TABLET    Take 1 tablet by mouth daily for 5 days       Disposition referral (if applicable):  Roxana Hua MD  [address] [place] [address] Dr Ibis Marmolejo [facility]  [phone]    Schedule an appointment as soon as possible for a visit today          Total critical care time is 15 minutes, which excludes separately billable procedures and updating family. Time spent is specifically for management of the presenting complaint and symptoms initially, direct bedside care, reevaluation, review of records, and consultation. There was a high probability of clinically significant life-threatening deterioration in the patient's condition, which required my urgent intervention. This chart was generated in part by using Dragon Dictation system and may contain errors related to that system including errors in grammar, punctuation, and spelling, as well as words and phrases that may be inappropriate. If there are any questions or concerns please feel free to contact the dictating provider for clarification.      MD Nilda Vazquez MD  09/28/20 0688

## 2020-09-28 NOTE — TELEPHONE ENCOUNTER
images of the upper abdomen are unremarkable.         Soft Tissues/Bones: No acute bone or soft tissue abnormality.              Impression    1. No evidence of pulmonary embolus    2. Scattered ground-glass opacities bilaterally, with evidence of    interstitial fibrosis.  Differential considerations for the ground-glass    opacities would include atypical pattern of edema, atypical/viral pneumonia,    versus an acute interstitial pneumonitis    3. Nonspecific mediastinal and hilar lymph nodes, which may be reactive or    neoplastic in etiology.         RECOMMENDATIONS:    Elective PET imaging may be helpful to further evaluate the adenopathy.

## 2020-09-28 NOTE — ED NOTES
Decrease in frequency of dry cough. Patient sitting on side of bed talking with spouse.      Amos Davis RN  09/28/20 9515

## 2020-09-28 NOTE — ED NOTES
Ambulate from bathroom to room 6. O2 sat was 82% on room air. Pulse 112. Respirations 24. After 2 minutes of 2L/NC o2 sat 99%. Pulse 99 and respirations 20.      Piyush Man RN  09/27/20 [id number]

## 2020-09-29 ENCOUNTER — CARE COORDINATION (OUTPATIENT)
Dept: CASE MANAGEMENT | Age: 65
End: 2020-09-29

## 2020-09-29 NOTE — CARE COORDINATION
Ambulatory Care Manager contacted the patient by telephone to perform post discharge assessment. Verified name and  with patient as identifiers. Patient contacted regarding recent discharge and COVID-19 risk. Discussed COVID-19 related testing which was not done at this time; provided contact number for the COVID-19 testing center. Patient has following risk factors of: heart failure, COPD, pneumonia and diabetes. ACM reviewed discharge instructions, medical action plan and red flags related to discharge diagnosis. Reviewed and educated them on any new and changed medications related to discharge diagnosis. Patient reports feeling much better today; she reports sob is at her baseline  Patient has contacted her pulmonolgist  Encouraged patient to view the www.cdc.gov web site to re-enforce information reviewed and for COVID-19 updates  Discussed My Chart; patient reports she is active in My Chart and accesses the educational section too  Patient agreed for this ACM to send the following via text: The patient hotline and the [address] contact numbers, and the link to the cdc coronavirus web site. Education provided regarding infection prevention, and signs and symptoms of COVID-19 and when to seek medical attention with patient who verbalized understanding. Discussed exposure protocols and quarantine from [address] you at higher risk for severe illness 2019 and given an opportunity for questions and concerns. The patient agrees to contact the COVID-19 hotline 252-522-1738 or PCP office for questions related to their healthcare. ACM provided contact information for future reference. From CDC: Are you at higher risk for severe illness?  Wash your hands often.  Avoid close contact (6 feet, which is about two arm lengths) with people who are sick.  Put distance between yourself and other people if COVID-19 is spreading in your community.    Clean and disinfect frequently touched surfaces.  Avoid all cruise travel and non-essential air travel.  Call your healthcare professional if you have concerns about COVID-19 and your underlying condition or if you are sick. For more information on steps you can take to protect yourself, see CDC's How to Protect Yourself    Pt will be further monitored by COVID Loop Team based on symptoms and risk factors.

## 2020-09-30 NOTE — TELEPHONE ENCOUNTER
Addendum still not complete. Spoke with [facility] radiology whom states there was a message sent back to Dr. Swain Given.  Will continue to watch

## 2020-10-05 NOTE — TELEPHONE ENCOUNTER
No radiation information found for this patient    Addendum     Signed by Evelia Amaya DO on 10/02/20 1513     ADDENDUM:  The current CT PA is now compared to a prior CT PA dated 7 November 2019     Diffuse interstitial fibrosis is again noted, with scattered areas of  ground-glass opacity again seen throughout the lungs bilaterally, similar  compared to the prior study, related to the underlying interstitial fibrosis. No focal area of consolidation is present. The mediastinal and hilar  adenopathy is similar compared to the prior study, likely reactive.     No acute interval change is present compared to the prior CT PA      Narrative    EXAMINATION:    CTA OF THE CHEST 9/28/2020 1:02 am         TECHNIQUE:    CTA of the chest was performed after the administration of intravenous    contrast.  Multiplanar reformatted images are provided for review.  MIP    images are provided for review. Dose modulation, iterative reconstruction,    and/or weight based adjustment of the mA/kV was utilized to reduce the    radiation dose to as low as reasonably achievable.         COMPARISON:    Chest x-ray dated September 27, 2020         HISTORY:    ORDERING SYSTEM PROVIDED HISTORY: SOB    TECHNOLOGIST PROVIDED HISTORY:    Reason for exam:->SOB    Reason for Exam: sob, elevated ddimer    Acuity: Acute    Type of Exam: Initial         FINDINGS:    Pulmonary Arteries: Pulmonary arteries are adequately opacified for    evaluation.  No evidence of intraluminal filling defect to suggest pulmonary    embolism.  Main pulmonary artery is normal in caliber.         Mediastinum: Mediastinal and hilar lymphadenopathy is present.  Largest AP    window lymph node measures 1.4 x 1.1 cm.  Largest subcarinal lymph node    measures 1.5 x 1.4 cm.  Largest right hilar lymph node measures 1.7 x 0.8 cm. Largest left hilar lymph node measures 1.2 by 0.8 cm. .  The heart and    pericardium demonstrate no acute abnormality.  There is no acute abnormality    of the thoracic aorta.         Lungs/pleura: A mild-to-moderate degree of interstitial pulmonary fibrosis is    noted.  Scattered areas of ground-glass opacity are seen bilaterally. Differential considerations would include atypical pattern of edema,    atypical/viral pneumonia, versus an acute interstitial pneumonitis.  No    pleural effusion or pneumothorax is present.         Upper Abdomen: Limited images of the upper abdomen are unremarkable.         Soft Tissues/Bones: No acute bone or soft tissue abnormality.              Impression    1. No evidence of pulmonary embolus    2. Scattered ground-glass opacities bilaterally, with evidence of    interstitial fibrosis.  Differential considerations for the ground-glass    opacities would include atypical pattern of edema, atypical/viral pneumonia,    versus an acute interstitial pneumonitis    3.  Nonspecific mediastinal and hilar lymph nodes, which may be reactive or    neoplastic in etiology.         RECOMMENDATIONS:    Elective PET imaging may be helpful to further evaluate the adenopathy.

## 2020-10-06 ENCOUNTER — TELEPHONE (OUTPATIENT)
Dept: PULMONOLOGY | Age: 65
End: 2020-10-06

## 2020-10-06 NOTE — TELEPHONE ENCOUNTER
Pt called and said that she was in the ER last Sunday and was put on Prednisone taper and antibiotics. Pt said that she is feeling great and that this is the best she has felt in a long time. Pt is asking if she can be put on a low dose of prednisone to take daily. Please advise    LOV: 7/23/20  ASSESSMENT:  · Cryptogenic organizing pneumonia with a possible additional non-specific fibrotic process  · Chronic hypoxic respiratory failure -resolved  · Cough and Fever has been intermittently associated with her condition  · Restrictive lung disease  · Morbid obesity  · Likely WILFRIDO  · Chronic Rhinitis with PND  · Hyperglycemia  · Obesity  · Afib     PLAN:   · Her condition has been steroid responsive  · Completed Prednisone 40mg for 4-6 weeks, 30mg for 1 month, 20mg for 1 month, 15mg for 6 weeks, 10mg for 6 weeks, 5mg for 8 weeks, 2.5mg daily for 2 weeks and  2.5mg QOD for 4 weeks.  Stopped Prednisone 5/2020  · Continue sinus regimen  · Continue Lasix  · PRN albuterol and albuterol nebs  · Mold remediated  · F/u in Nov with 1 year follow up Chest CT

## 2020-10-28 ENCOUNTER — TELEPHONE (OUTPATIENT)
Dept: PULMONOLOGY | Age: 65
End: 2020-10-28

## 2020-10-28 ENCOUNTER — VIRTUAL VISIT (OUTPATIENT)
Dept: PULMONOLOGY | Age: 65
End: 2020-10-28
Payer: MEDICARE

## 2020-10-28 PROCEDURE — 99442 PR PHYS/QHP TELEPHONE EVALUATION 11-20 MIN: CPT | Performed by: INTERNAL MEDICINE

## 2020-10-28 RX ORDER — BENZONATATE 200 MG/1
CAPSULE ORAL
Qty: 90 CAPSULE | Refills: 5 | Status: SHIPPED | OUTPATIENT
Start: 2020-10-28 | End: 2021-09-13

## 2020-10-28 NOTE — PROGRESS NOTES
Aguila Akins is a 77 y.o. female evaluated via telephone on 10/28/2020. Consent:  She and/or health care decision maker is aware that that she may receive a bill for this telephone service, depending on her insurance coverage, and has provided verbal consent to proceed: Yes      Documentation:  I communicated with the patient and/or health care decision maker about sinus congestion and a cough. Details of this discussion including any medical advice provided:   + nasal congestion and PND  Shortness of Breath  No  Wheezing  No  Cough  yes  Cough Characteristics:  Productive    yes  Sputum Color    green  Hemoptysis   No  Consistency of sputum   thick     Fever:    no                                                 Temp:n/a  Chills/Sweats:  no  What other symptoms are you having?:  none  How long have you had these symptoms? Cough has been going on for awhile but worsening with more sputum       ASSESSMENT:  · Cryptogenic organizing pneumonia   · Chronic hypoxic respiratory failure -resolved  · Cough and Fever has been intermittently associated with her condition  · Restrictive lung disease  · Morbid obesity  · Likely WILFRIDO  · Chronic Rhinitis with PND - worse  · Hyperglycemia  · Obesity  · Afib     PLAN:   · Rx for tessalon pearles  · Her condition has been steroid responsive  · Completed Prednisone 40mg for 4-6 weeks, 30mg for 1 month, 20mg for 1 month, 15mg for 6 weeks, 10mg for 6 weeks, 5mg for 8 weeks, 2.5mg daily for 2 weeks and  2.5mg QOD for 4 weeks. Stopped Prednisone 5/2020  · Continue sinus regimen: flonase and Claritin   · Continue Lasix  · PRN albuterol and albuterol nebs  · Mold remediated  · CTPA done in the emergency room at the end of September showed stable changes and she does not need the currently scheduled scan in November.   F/u in 3 months    I affirm this is a Patient Initiated Episode with a Patient who has not had a related appointment within my department in the past 7 days or scheduled within the next 24 hours.   Patient identification was verified at the start of the visit: Yes  Total Time: 11 min      FAHAD CALABRESE

## 2020-10-28 NOTE — TELEPHONE ENCOUNTER
Patient requesting tessalon Perles as well    Do you have the following symptoms? Shortness of Breath  No  Wheezing  No  Cough  yes  Cough Characteristics:  Productive    yes  Sputum Color    green  Hemoptysis   No  Consistency of sputum   thick     Fever:    no    Temp:n/a  Chills/Sweats:  no  What other symptoms are you having?:  none    How long have you had these symptoms? Cough has been going on for awhile but worsening with more sputum     Pharmacy: [facility]          Review medications and allergies: Allergies? Allergies   Allergen Reactions    Penicillins Anaphylaxis     Tolerates Meropenem.  Cefuroxime      Tolerates Meropenem.  Doxycycline Hives    Imitrex [Sumatriptan] Other (See Comments)     Feels like pins and needles    Meperidine     Sulfa Antibiotics Hives    Keflex [Cephalexin] Itching and Rash     Tolerates Meropenem.  Tape [employer] Tape] Rash             Currently on Antibiotics? (Drug/Dose/Frequency and how long on?) No        Systemic Steroids? (Drug/Dose/Frequency and how long on?) no      Last sick call taken on 5/29/20. Meds prescribed were VV    LOV: 7/23/20    ASSESSMENT:  · Cryptogenic organizing pneumonia with a possible additional non-specific fibrotic process  · Chronic hypoxic respiratory failure -resolved  · Cough and Fever has been intermittently associated with her condition  · Restrictive lung disease  · Morbid obesity  · Likely WILFRIDO  · Chronic Rhinitis with PND  · Hyperglycemia  · Obesity  · Afib     PLAN:   · Her condition has been steroid responsive  · Completed Prednisone 40mg for 4-6 weeks, 30mg for 1 month, 20mg for 1 month, 15mg for 6 weeks, 10mg for 6 weeks, 5mg for 8 weeks, 2.5mg daily for 2 weeks and  2.5mg QOD for 4 weeks.  Stopped Prednisone 5/2020  · Continue sinus regimen  · Continue Lasix  · PRN albuterol and albuterol nebs  · Mold remediated  · F/u in Nov with 1 year follow up Chest CT

## 2020-11-20 ENCOUNTER — HOSPITAL ENCOUNTER (EMERGENCY)
Age: 65
Discharge: HOME | End: 2020-11-20
Attending: EMERGENCY MEDICINE
Payer: MEDICARE

## 2020-11-20 VITALS
OXYGEN SATURATION: 95 % | WEIGHT: 201 LBS | BODY MASS INDEX: 35.61 KG/M2 | SYSTOLIC BLOOD PRESSURE: 119 MMHG | TEMPERATURE: 98.4 F | HEART RATE: 82 BPM | DIASTOLIC BLOOD PRESSURE: 78 MMHG | HEIGHT: 63 IN | RESPIRATION RATE: 16 BRPM

## 2020-11-20 PROCEDURE — 6370000000 HC RX 637 (ALT 250 FOR IP): Performed by: EMERGENCY MEDICINE

## 2020-11-20 PROCEDURE — 99284 EMERGENCY DEPT VISIT MOD MDM: CPT

## 2020-11-20 RX ORDER — OXYMETAZOLINE HYDROCHLORIDE 0.05 G/100ML
2 SPRAY NASAL ONCE
Status: COMPLETED | OUTPATIENT
Start: 2020-11-20 | End: 2020-11-20

## 2020-11-20 RX ADMIN — Medication 2 SPRAY: at 12:29

## 2020-11-20 ASSESSMENT — ENCOUNTER SYMPTOMS
RHINORRHEA: 0
SINUS PAIN: 0
TROUBLE SWALLOWING: 0
BACK PAIN: 0
SHORTNESS OF BREATH: 0
ABDOMINAL PAIN: 0

## 2020-11-20 NOTE — ED PROVIDER NOTES
PAST MEDICAL HISTORY     Past Medical History:   Diagnosis Date    [facility]     Anxiety     Cryptogenic organizing pneumonia ([facility]     Depression     GERD (gastroesophageal reflux disease)     Hyperlipidemia     On home oxygen therapy     uses O2 3L prn    Rash     Thyroid disease     hypothyroidism         SURGICAL HISTORY       Past Surgical History:   Procedure Laterality Date    ARM SURGERY Left 3/2/2020    LEFT ULNAR NERVE DECOMPRESSION AT THE ELBOW performed by Tuan Bacon MD at [address] N/A 6/27/2019    EBUS WF W/ANES.  performed by Ricci Goff MD at [address] [place] [address]  6/27/2019    BRONCHOSCOPY/TRANSBRONCHIAL NEEDLE BIOPSY performed by Ricci Goff MD at [address] [place] [address]  6/27/2019    BRONCHOSCOPY/TRANSBRONCHIAL LUNG BIOPSY performed by Ricci Goff MD at [address] [place] [address]  6/27/2019    BRONCHOSCOPY ALVEOLAR LAVAGE performed by Ricci Goff MD at [address] [place] [address]  07/10/2019    BRONCHOSCOPY N/A 7/10/2019    BRONCHOSCOPY ALVEOLAR LAVAGE performed by Sara Steen MD at [address] [place] [address] Bilateral 08/06/2019    BRONCHOSCOPY N/A 8/6/2019    BRONCHOSCOPY ALVEOLAR LAVAGE performed by Sury Dominguez MD at [address] [place] [address] N/A 12/24/2019    BRONCHOSCOPY ALVEOLAR LAVAGE performed by Marla De La Vega MD at [address] [place] [address], TOTAL ABDOMINAL      partial         CURRENT MEDICATIONS       Previous Medications    ALBUTEROL (PROVENTIL) (2.5 MG/3ML) 0.083% NEBULIZER SOLUTION    INHALE 3 MLS BY NEBULIZATION EVERY 6 HOURS AS NEEDED FOR WHEEZING    ALBUTEROL SULFATE HFA (PROVENTIL HFA) 108 (90 BASE) MCG/ACT INHALER    Inhale 2 puffs into the lungs every 6 hours as needed for Wheezing    AMITRIPTYLINE (ELAVIL) 25 MG TABLET    TAKE 1 TABLET BY MOUTH EVERY DAY AT NIGHT    BENZONATATE (TESSALON) 200 MG CAPSULE    TAKE 1 CAPSULE BY MOUTH 3 TIMES A DAY AS NEEDED FOR COUGH    BLOOD GLUCOSE TEST STRIPS (ONETOUCH VERIO) STRIP    1 each by In Vitro route daily As needed. BLOOD GLUCOSE TEST STRIPS (ONETOUCH VERIO) STRIP    1 each by In Vitro route 2 times daily As needed. DICLOFENAC SODIUM (VOLTAREN) 1 % GEL    Apply 2 g topically 4 times daily    EMPAGLIFLOZIN (JARDIANCE) 25 MG TABLET    Take 1 tablet by mouth daily    ESOMEPRAZOLE MAGNESIUM (NEXIUM 24HR PO)    Take by mouth daily     FLUTICASONE FUROATE IN    Inhale into the lungs as needed    FUROSEMIDE (LASIX) 40 MG TABLET    TAKE 1 TABLET BY MOUTH EVERY DAY    HYDROXYZINE (ATARAX) 10 MG TABLET    TAKE 1 TO 3 TABLETS BY MOUTH 3 TIMES DAILY AS NEEDED FOR ITCHING    IBUPROFEN (ADVIL;MOTRIN) 600 MG TABLET    Take 1 tablet by mouth every 6 hours as needed for Pain    IBUPROFEN (ADVIL;MOTRIN) 600 MG TABLET    Take 1 tablet by mouth every 6 hours as needed for Pain    INSULIN SYRINGE 1CC/29G 29G X 1/2\" 1 ML MISC    1 each by Does not apply route 2 times daily    LEVOTHYROXINE (SYNTHROID) 150 MCG TABLET    Take 1 tablet by mouth Daily    LORAZEPAM (ATIVAN) 0.5 MG TABLET    Take 1 tablet by mouth every 6 hours as needed for Anxiety for up to 180 days. MIRTAZAPINE (REMERON) 30 MG TABLET    Take 1 tablet by mouth nightly    MULTIPLE VITAMINS-MINERALS (MULTIVITAMIN ADULT PO)    Take by mouth daily    NADOLOL (CORGARD) 20 MG TABLET    Take 1 tablet by mouth daily    OXYGEN    Inhale 3 L into the lungs    PANTOPRAZOLE (PROTONIX) 40 MG TABLET    Take 1 tablet by mouth every morning (before breakfast)    POTASSIUM CHLORIDE (KLOR-CON M) 10 MEQ EXTENDED RELEASE TABLET    Take 2 tablets by mouth daily TAKE 1 TABLET BY MOUTH EVERY DAY    SERTRALINE (ZOLOFT) 100 MG TABLET    Take 2 tablets by mouth daily    SITAGLIPTIN (JANUVIA) 100 MG TABLET    Take 1 tablet by mouth daily    TRAZODONE (DESYREL) 50 MG TABLET    TAKE 1 TABLET BY MOUTH EVERY DAY AT NIGHT       ALLERGIES     Penicillins;  Cefuroxime; Doxycycline; Imitrex [sumatriptan]; Meperidine; Sulfa antibiotics; Keflex [cephalexin]; and Tape [adhesive tape]    FAMILY HISTORY       Family History   Problem Relation Age of Onset    Diabetes Mother     High Blood Pressure Mother     Asthma Sister     Other Father           SOCIAL HISTORY       Social History     Socioeconomic History    Marital status:      Spouse name: None    Number of children: 7    Years of education: None    Highest education level: None   Occupational History    None   Social Needs    Financial resource strain: None    Food insecurity     Worry: None     Inability: None    Transportation needs     Medical: None     Non-medical: None   Tobacco Use    Smoking status: Never Smoker    Smokeless tobacco: Never Used   Substance and Sexual Activity    Alcohol use: No    Drug use: No    Sexual activity: Not Currently   Lifestyle    Physical activity     Days per week: 0 days     Minutes per session: None    Stress: None   Relationships    Social connections     Talks on phone: None     Gets together: None     Attends Rastafari service: None     Active member of club or organization: None     Attends meetings of clubs or organizations: None     Relationship status: None    Intimate partner violence     Fear of current or ex partner: None     Emotionally abused: None     Physically abused: None     Forced sexual activity: None   Other Topics Concern    None   Social History Narrative    None       SCREENINGS               PHYSICAL EXAM    (up to 7 for level 4, 8 or more for level 5)     ED Triage Vitals [11/20/20 1155]   BP Temp Temp Source Pulse Resp SpO2 Height Weight   129/74 98.4 °F (36.9 °C) Oral 89 16 94 % 5' 3\" (1.6 m) 201 lb (91.2 kg)       Physical Exam  Vitals signs and nursing note reviewed. Constitutional:       General: She is not in acute distress. Appearance: She is well-developed. She is not diaphoretic. HENT:      Head: Normocephalic. Right Ear: Tympanic membrane and ear canal normal.      Left Ear: Tympanic membrane and ear canal normal.      Nose:      Comments: Very very mild dried blood in nares no bleeding noted posteriorly  We did spray her with some Afrin nasal spray and she had no further bleeding she was thus reassured advised on simple techniques to stop epistaxis  Eyes:      Conjunctiva/sclera: Conjunctivae normal.      Pupils: Pupils are equal, round, and reactive to light. Neck:      Musculoskeletal: Normal range of motion and neck supple. Thyroid: No thyromegaly. Cardiovascular:      Rate and Rhythm: Normal rate and regular rhythm. Heart sounds: Normal heart sounds. No murmur. No friction rub. No gallop. Pulmonary:      Effort: Pulmonary effort is normal. No respiratory distress. Breath sounds: Normal breath sounds. Abdominal:      General: Bowel sounds are normal. There is no distension. Palpations: Abdomen is soft. Tenderness: There is no abdominal tenderness. Neurological:      Mental Status: She is alert and oriented to person, place, and time. GCS: GCS eye subscore is 4. GCS verbal subscore is 5. GCS motor subscore is 6. Cranial Nerves: No cranial nerve deficit. Sensory: No sensory deficit. Motor: No abnormal muscle tone.       Coordination: Coordination normal.      Deep Tendon Reflexes: Reflexes normal.   Psychiatric:         Behavior: Behavior normal.         DIAGNOSTIC RESULTS     EKG: All EKG's are interpreted by the Emergency Department Physician who either signs or Co-signs this chart in the absence of a cardiologist.        RADIOLOGY:   Non-plain film images such as CT, Ultrasound and MRI are read by the radiologist. Plain radiographic images are visualized and preliminarily interpreted by the emergency physician with the below findings:        Interpretation per the Radiologist below, if available at the time of this note:    No orders to display LABS:  No results found for this visit on 11/20/20. EMERGENCY DEPARTMENT COURSE and DIFFERENTIAL DIAGNOSIS/MDM:     Vitals:    11/20/20 1155 11/20/20 1215   BP: 129/74 119/78   Pulse: 89    Resp: 16    Temp: 98.4 °F (36.9 °C)    TempSrc: Oral    SpO2: 94% 94%   Weight: 201 lb (91.2 kg)    Height: 5' 3\" (1.6 m)            MDM      REASSESSMENT          CRITICAL CARE TIME     CONSULTS:  None      PROCEDURES:     Procedures    MEDICATIONS GIVEN THIS VISIT:  Medications   oxymetazoline (AFRIN) 0.05 % nasal spray 2 spray (2 sprays Each Nostril Given 11/20/20 1229)        FINAL IMPRESSION      1. Epistaxis            DISPOSITION/PLAN   DISPOSITION Decision To Discharge 11/20/2020 12:44:29 PM      PATIENT REFERRED TO:  [facility] Emergency Department  [address] [place] [address]  [phone]    If symptoms worsen      DISCHARGE MEDICATIONS:  New Prescriptions    No medications on file       Controlled Substances Monitoring  RX Monitoring 5/22/2020   Attestation -   Periodic Controlled Substance Monitoring Possible medication side effects, risk of tolerance/dependence & alternative treatments discussed. ;No signs of potential drug abuse or diversion identified. Chronic Pain > 80 MEDD Obtained or confirmed \"Medication Contract\" on file. No further bleeding noted patient is discharged home      (Please note that portions of this note were completed with a voice recognition program.  Efforts were made to edit the dictations but occasionally words are mis-transcribed.)    Patient was advised to return to the Emergency Department if there was any worsening.     Milo Dan MD (electronically signed)  Attending Emergency Physician         Sydnie Anderson MD  11/20/20 [phone]

## 2020-11-23 ENCOUNTER — OFFICE VISIT (OUTPATIENT)
Dept: FAMILY MEDICINE CLINIC | Age: 65
End: 2020-11-23
Payer: MEDICARE

## 2020-11-23 VITALS
WEIGHT: 210.8 LBS | SYSTOLIC BLOOD PRESSURE: 132 MMHG | DIASTOLIC BLOOD PRESSURE: 82 MMHG | BODY MASS INDEX: 37.34 KG/M2 | OXYGEN SATURATION: 98 % | TEMPERATURE: 96.9 F | HEART RATE: 79 BPM

## 2020-11-23 LAB — HBA1C MFR BLD: 7.1 %

## 2020-11-23 PROCEDURE — G8417 CALC BMI ABV UP PARAM F/U: HCPCS | Performed by: FAMILY MEDICINE

## 2020-11-23 PROCEDURE — 36415 COLL VENOUS BLD VENIPUNCTURE: CPT | Performed by: FAMILY MEDICINE

## 2020-11-23 PROCEDURE — G8926 SPIRO NO PERF OR DOC: HCPCS | Performed by: FAMILY MEDICINE

## 2020-11-23 PROCEDURE — 3023F SPIROM DOC REV: CPT | Performed by: FAMILY MEDICINE

## 2020-11-23 PROCEDURE — 1090F PRES/ABSN URINE INCON ASSESS: CPT | Performed by: FAMILY MEDICINE

## 2020-11-23 PROCEDURE — G0008 ADMIN INFLUENZA VIRUS VAC: HCPCS | Performed by: FAMILY MEDICINE

## 2020-11-23 PROCEDURE — G8484 FLU IMMUNIZE NO ADMIN: HCPCS | Performed by: FAMILY MEDICINE

## 2020-11-23 PROCEDURE — 4040F PNEUMOC VAC/ADMIN/RCVD: CPT | Performed by: FAMILY MEDICINE

## 2020-11-23 PROCEDURE — 1123F ACP DISCUSS/DSCN MKR DOCD: CPT | Performed by: FAMILY MEDICINE

## 2020-11-23 PROCEDURE — 2022F DILAT RTA XM EVC RTNOPTHY: CPT | Performed by: FAMILY MEDICINE

## 2020-11-23 PROCEDURE — 90694 VACC AIIV4 NO PRSRV 0.5ML IM: CPT | Performed by: FAMILY MEDICINE

## 2020-11-23 PROCEDURE — G8427 DOCREV CUR MEDS BY ELIG CLIN: HCPCS | Performed by: FAMILY MEDICINE

## 2020-11-23 PROCEDURE — 83036 HEMOGLOBIN GLYCOSYLATED A1C: CPT | Performed by: FAMILY MEDICINE

## 2020-11-23 PROCEDURE — 3017F COLORECTAL CA SCREEN DOC REV: CPT | Performed by: FAMILY MEDICINE

## 2020-11-23 PROCEDURE — 1036F TOBACCO NON-USER: CPT | Performed by: FAMILY MEDICINE

## 2020-11-23 PROCEDURE — 3051F HG A1C>EQUAL 7.0%<8.0%: CPT | Performed by: FAMILY MEDICINE

## 2020-11-23 PROCEDURE — 99214 OFFICE O/P EST MOD 30 MIN: CPT | Performed by: FAMILY MEDICINE

## 2020-11-23 PROCEDURE — G8400 PT W/DXA NO RESULTS DOC: HCPCS | Performed by: FAMILY MEDICINE

## 2020-11-23 RX ORDER — AMITRIPTYLINE HYDROCHLORIDE 25 MG/1
TABLET, FILM COATED ORAL
Qty: 90 TABLET | Refills: 1 | Status: SHIPPED | OUTPATIENT
Start: 2020-11-23 | End: 2021-05-11 | Stop reason: SDUPTHER

## 2020-11-23 RX ORDER — DEXTROMETHORPHAN HYDROBROMIDE AND PROMETHAZINE HYDROCHLORIDE 15; 6.25 MG/5ML; MG/5ML
5 SYRUP ORAL 4 TIMES DAILY PRN
Qty: 240 ML | Refills: 0 | Status: SHIPPED | OUTPATIENT
Start: 2020-11-23 | End: 2021-05-11

## 2020-11-23 RX ORDER — LORATADINE AND PSEUDOEPHEDRINE SULFATE 5; 120 MG/1; MG/1
1 TABLET, EXTENDED RELEASE ORAL 2 TIMES DAILY
Qty: 180 TABLET | Refills: 1 | Status: SHIPPED | OUTPATIENT
Start: 2020-11-23 | End: 2021-05-11

## 2020-11-23 RX ORDER — MIRTAZAPINE 30 MG/1
30 TABLET, FILM COATED ORAL NIGHTLY
Qty: 90 TABLET | Refills: 1 | Status: SHIPPED | OUTPATIENT
Start: 2020-11-23 | End: 2021-05-11 | Stop reason: SDUPTHER

## 2020-11-23 RX ORDER — NADOLOL 20 MG/1
20 TABLET ORAL DAILY
Qty: 90 TABLET | Refills: 1 | Status: SHIPPED | OUTPATIENT
Start: 2020-11-23 | End: 2021-05-11 | Stop reason: SDUPTHER

## 2020-11-23 RX ORDER — LEVOTHYROXINE SODIUM 0.15 MG/1
150 TABLET ORAL DAILY
Qty: 90 TABLET | Refills: 1 | Status: SHIPPED | OUTPATIENT
Start: 2020-11-23 | End: 2021-03-24 | Stop reason: SDUPTHER

## 2020-11-23 RX ORDER — SERTRALINE HYDROCHLORIDE 100 MG/1
200 TABLET, FILM COATED ORAL DAILY
Qty: 180 TABLET | Refills: 1 | Status: SHIPPED | OUTPATIENT
Start: 2020-11-23 | End: 2021-05-11 | Stop reason: SDUPTHER

## 2020-11-23 RX ORDER — PANTOPRAZOLE SODIUM 40 MG/1
40 TABLET, DELAYED RELEASE ORAL
Qty: 90 TABLET | Refills: 1 | Status: SHIPPED | OUTPATIENT
Start: 2020-11-23 | End: 2021-05-11 | Stop reason: SDUPTHER

## 2020-11-23 RX ORDER — POTASSIUM CHLORIDE 750 MG/1
20 TABLET, EXTENDED RELEASE ORAL DAILY
Qty: 180 TABLET | Refills: 1 | Status: SHIPPED | OUTPATIENT
Start: 2020-11-23 | End: 2021-05-11 | Stop reason: SDUPTHER

## 2020-11-23 RX ORDER — FUROSEMIDE 40 MG/1
TABLET ORAL
Qty: 90 TABLET | Refills: 1 | Status: SHIPPED | OUTPATIENT
Start: 2020-11-23 | End: 2021-05-11 | Stop reason: SDUPTHER

## 2020-11-23 RX ORDER — TRAZODONE HYDROCHLORIDE 50 MG/1
TABLET ORAL
Qty: 90 TABLET | Refills: 1 | Status: SHIPPED | OUTPATIENT
Start: 2020-11-23 | End: 2021-05-11 | Stop reason: SDUPTHER

## 2020-11-23 ASSESSMENT — PATIENT HEALTH QUESTIONNAIRE - PHQ9
SUM OF ALL RESPONSES TO PHQ QUESTIONS 1-9: 0
2. FEELING DOWN, DEPRESSED OR HOPELESS: 0
SUM OF ALL RESPONSES TO PHQ9 QUESTIONS 1 & 2: 0
SUM OF ALL RESPONSES TO PHQ QUESTIONS 1-9: 0
SUM OF ALL RESPONSES TO PHQ QUESTIONS 1-9: 0
1. LITTLE INTEREST OR PLEASURE IN DOING THINGS: 0

## 2020-11-23 NOTE — PROGRESS NOTES
Vaccine Information Sheet, \"Influenza - Inactivated\"  given to Rosetta Ojeda, or parent/legal guardian of  Rosetta Ojeda and verbalized understanding. Patient responses:    Have you ever had a reaction to a flu vaccine? No  Do you have any current illness? No  Have you ever had Guillian Russell Syndrome? No  Do you have a serious allergy to any of the follow: Neomycin, Polymyxin, Thimerosal, eggs or egg products? No    Flu vaccine given per order. Please see immunization tab. Risks and benefits explained. Current VIS given.       Immunizations Administered     Name Date Dose Route    Influenza, Quadv, adjuvanted, 65 yrs +, IM, PF (Fluad) 11/23/2020 0.5 mL Intramuscular    Site: Deltoid- Left    Lot: [id number]    NDC: 69858-352-33

## 2020-11-23 NOTE — PATIENT INSTRUCTIONS
Patient Education        Diabetes Foot Health: Care Instructions  Your Care Instructions     When you have diabetes, your feet need extra care and attention. Diabetes can damage the nerve endings and blood vessels in your feet, making you less likely to notice when your feet are injured. Diabetes also limits your body's ability to fight infection and get blood to areas that need it. If you get a minor foot injury, it could become an ulcer or a serious infection. With good foot care, you can prevent most of these problems. Caring for your feet can be quick and easy. Most of the care can be done when you are bathing or getting ready for bed. Follow-up care is a key part of your treatment and safety. Be sure to make and go to all appointments, and call your doctor if you are having problems. It's also a good idea to know your test results and keep a list of the medicines you take. How can you care for yourself at home? · Keep your blood sugar close to normal by watching what and how much you eat, monitoring blood sugar, taking medicines if prescribed, and getting regular exercise. · Do not smoke. Smoking affects blood flow and can make foot problems worse. If you need help quitting, talk to your doctor about stop-smoking programs and medicines. These can increase your chances of quitting for good. · Eat a diet that is low in fats. High fat intake can cause fat to build up in your blood vessels and decrease blood flow. · Inspect your feet daily for blisters, cuts, cracks, or sores. If you cannot see well, use a mirror or have someone help you. · Take care of your feet:  ? Wash your feet every day. Use warm (not hot) water. Check the water temperature with your wrists or other part of your body, not your feet. ? Dry your feet well. Pat them dry. Do not rub the skin on your feet too hard. Dry well between your toes.  If the skin on your feet stays moist, bacteria or a fungus can grow, which can lead to infection. ? Keep your skin soft. Use moisturizing skin cream to keep the skin on your feet soft and prevent calluses and cracks. But do not put the cream between your toes, and stop using any cream that causes a rash. ? Clean underneath your toenails carefully. Do not use a sharp object to clean underneath your toenails. Use the blunt end of a nail file or other rounded tool. ? Trim and file your toenails straight across to prevent ingrown toenails. Use a nail clipper, not scissors. Use an emery board to smooth the edges. · Change socks daily. Socks without seams are best, because seams often rub the feet. You can find socks for people with diabetes from specialty catalogs. · Look inside your shoes every day for things like gravel or torn linings, which could cause blisters or sores. · Buy shoes that fit well:  ? Look for shoes that have plenty of space around the toes. This helps prevent bunions and blisters. ? Try on shoes while wearing the kind of socks you will usually wear with the shoes. ? Avoid plastic shoes. They may rub your feet and cause blisters. Good shoes should be made of materials that are flexible and breathable, such as leather or cloth. ? Break in new shoes slowly by wearing them for no more than an hour a day for several days. Take extra time to check your feet for red areas, blisters, or other problems after you wear new shoes. · Do not go barefoot. Do not wear sandals, and do not wear shoes with very thin soles. Thin soles are easy to puncture. They also do not protect your feet from hot pavement or cold weather. · Have your doctor check your feet during each visit. If you have a foot problem, see your doctor. Do not try to treat an early foot problem at home. Home remedies or treatments that you can buy without a prescription (such as corn removers) can be harmful. · Always get early treatment for foot problems.  A minor irritation can lead to a major problem if not properly cared for early. When should you call for help? Call your doctor now or seek immediate medical care if:    · You have a foot sore, an ulcer or break in the skin that is not healing after 4 days, bleeding corns or calluses, or an ingrown toenail.     · You have blue or black areas, which can mean bruising or blood flow problems.     · You have peeling skin or tiny blisters between your toes or cracking or oozing of the skin.     · You have a fever for more than 24 hours and a foot sore.     · You have new numbness or tingling in your feet that does not go away after you move your feet or change positions.     · You have unexplained or unusual swelling of the foot or ankle. Watch closely for changes in your health, and be sure to contact your doctor if:    · You cannot do proper foot care. Where can you learn more? Go to [email or web address]://[email or web address] [email or web address].[email or web address]. org and sign in to your [employer] account. Enter A739 in the [employer] box to learn more about \"Diabetes Foot Health: Care Instructions. \"     If you do not have an account, please click on the \"Sign Up Now\" link. Current as of: December 20, 2019               Content Version: 12.6  © 6279-0805 [employer]. Care instructions adapted under license by [employer] [facility] ([facility]). If you have questions about a medical condition or this instruction, always ask your healthcare professional. [place] [address] any warranty or liability for your use of this information.

## 2020-11-23 NOTE — PROGRESS NOTES
Leim Kussmaul is a 77 y.o. female who presents for evaluation of hypertension, hyperlipidemia, and diabetes. . She indicates that she is feeling well and denies any symptoms referable to her elevated blood pressure. Specifically denies chest pain, palpitations, dyspnea, orthopnea, PND or peripheral edema. No anorexia, arthralgia, or leg cramps noted. Current medication regimen is as listed below. She denies any side effects of medication, and has been taking it regularly. medication compliance:  compliant all of the time, diabetic diet compliance:  compliant most of the time, home glucose monitoring: are performed regularly, values range 120-150, further diabetic ROS: no polyuria or polydipsia, no chest pain, dyspnea or TIAs, no numbness, tingling or pain in extremities, last eye exam approximately 10 months ago. Depression: Patient complains of depression. She complains of anhedonia, depressed mood, insomnia and anxiety. Onset was approximately several years ago, controlled since that time. She denies current suicidal and homicidal plan or intent. Family history significant for no psychiatric illness. Possible organic causes contributing are: none. Risk factors: coronavirus pandemic Previous treatment includes Ativan ( rarely uses) , Zoloft and Remeron. She complains of the following side effects from the treatment: none. Hypothyroidism: Patient complains of hypothyroidism. Symptoms include none. Patient denies change in energy level, diarrhea, heat / cold intolerance, nervousness, palpitations and weight changes. Onset of symptoms was several years ago. Symptoms have been well-controlled. Patient with restrictive lung disease, currently being treated with albuterol nebulizer and sometimes inhaler. She currently sees a pulmonologist but needs refills. Patient with intermittent migraines. Patient controls them with Elavil. Rare migraines at this time.     CHF: Denies any leg swelling or shortness of breath. Currently taking Lasix, potassium. ROS: No TIA's or dysphagia. No prolonged cough. No dyspnea or chest pain on exertion. No abdominal pain, change in bowel habits, black or bloody stools. No urinary tract symptoms. She is post menopausal. No hot flashes, abnormal vaginal bleeding, discharge or unexpected pelvic pain. No new breast lumps, breast pain or nipple discharge. Past Medical History:   Diagnosis Date    A-fib ([address]     Anxiety     Cryptogenic organizing pneumonia ([address]     Depression     GERD (gastroesophageal reflux disease)     Hyperlipidemia     On home oxygen therapy     uses O2 3L prn    Rash     Thyroid disease     hypothyroidism     Past Surgical History:   Procedure Laterality Date    ARM SURGERY Left 3/2/2020    LEFT ULNAR NERVE DECOMPRESSION AT THE ELBOW performed by Shauna Smith MD at [address] [facility] 6/27/2019    EBUS WF W/ANES.  performed by Sonja Morgan MD at [address] [place] [address]  6/27/2019    BRONCHOSCOPY/TRANSBRONCHIAL NEEDLE BIOPSY performed by Sonja Morgan MD at [address] [place] [address]  6/27/2019    BRONCHOSCOPY/TRANSBRONCHIAL LUNG BIOPSY performed by Sonja Morgan MD at [address] [place] [address]  6/27/2019    BRONCHOSCOPY ALVEOLAR LAVAGE performed by Sonja Morgan MD at [address] [place] [address]  07/10/2019    BRONCHOSCOPY N/A 7/10/2019    BRONCHOSCOPY ALVEOLAR LAVAGE performed by Yamil Weston MD at [address] [place] [address] Bilateral 08/06/2019    BRONCHOSCOPY N/A 8/6/2019    BRONCHOSCOPY ALVEOLAR LAVAGE performed by Ari Cabezas MD at [address] [place] [address] N/A 12/24/2019    BRONCHOSCOPY ALVEOLAR LAVAGE performed by Gege Arce MD at [facility], TOTAL ABDOMINAL      partial         Current Outpatient Medications   Medication Sig Dispense Refill    benzonatate (TESSALON) 200 MG capsule TAKE 1 CAPSULE BY MOUTH 3 TIMES A DAY AS NEEDED FOR COUGH 90 capsule 5    pantoprazole (PROTONIX) 40 MG tablet Take 1 tablet by mouth every morning (before breakfast) 90 tablet 0    potassium chloride (KLOR-CON M) 10 MEQ extended release tablet Take 2 tablets by mouth daily TAKE 1 TABLET BY MOUTH EVERY  tablet 0    ibuprofen (ADVIL;MOTRIN) 600 MG tablet Take 1 tablet by mouth every 6 hours as needed for Pain 60 tablet 1    diclofenac sodium (VOLTAREN) 1 % GEL Apply 2 g topically 4 times daily 2 Tube 1    SITagliptin (JANUVIA) 100 MG tablet Take 1 tablet by mouth daily 90 tablet 1    empagliflozin (JARDIANCE) 25 MG tablet Take 1 tablet by mouth daily 90 tablet 1    sertraline (ZOLOFT) 100 MG tablet Take 2 tablets by mouth daily 180 tablet 1    albuterol sulfate HFA (PROVENTIL HFA) 108 (90 Base) MCG/ACT inhaler Inhale 2 puffs into the lungs every 6 hours as needed for Wheezing 1 Inhaler 2    albuterol (PROVENTIL) (2.5 MG/3ML) 0.083% nebulizer solution INHALE 3 MLS BY NEBULIZATION EVERY 6 HOURS AS NEEDED FOR WHEEZING 750 mL 1    levothyroxine (SYNTHROID) 150 MCG tablet Take 1 tablet by mouth Daily 90 tablet 1    nadolol (CORGARD) 20 MG tablet Take 1 tablet by mouth daily 90 tablet 1    LORazepam (ATIVAN) 0.5 MG tablet Take 1 tablet by mouth every 6 hours as needed for Anxiety for up to 180 days.  90 tablet 1    furosemide (LASIX) 40 MG tablet TAKE 1 TABLET BY MOUTH EVERY DAY 90 tablet 1    mirtazapine (REMERON) 30 MG tablet Take 1 tablet by mouth nightly 90 tablet 1    traZODone (DESYREL) 50 MG tablet TAKE 1 TABLET BY MOUTH EVERY DAY AT NIGHT 90 tablet 1    amitriptyline (ELAVIL) 25 MG tablet TAKE 1 TABLET BY MOUTH EVERY DAY AT NIGHT 90 tablet 1    INSULIN SYRINGE 1CC/29G 29G X 1/2\" 1 ML MISC 1 each by Does not apply route 2 times daily 100 each 5    ibuprofen (ADVIL;MOTRIN) 600 MG tablet Take 1 tablet by mouth every 6 hours as needed for Pain 60 tablet 1    FLUTICASONE FUROATE IN Inhale into the lungs as needed      Multiple Vitamins-Minerals (MULTIVITAMIN ADULT PO) Take by mouth daily      hydrOXYzine (ATARAX) 10 MG tablet TAKE 1 TO 3 TABLETS BY MOUTH 3 TIMES DAILY AS NEEDED FOR ITCHING 90 tablet 0    blood glucose test strips (ONETOUCH VERIO) strip 1 each by In Vitro route 2 times daily As needed. 100 each 5    blood glucose test strips (ONETOUCH VERIO) strip 1 each by In Vitro route daily As needed. 100 each 3    OXYGEN Inhale 3 L into the lungs      Esomeprazole Magnesium (NEXIUM 24HR PO) Take by mouth daily        No current facility-administered medications for this visit. Allergies   Allergen Reactions    Penicillins Anaphylaxis     Tolerates Meropenem.  Cefuroxime      Tolerates Meropenem.  Doxycycline Hives    Imitrex [Sumatriptan] Other (See Comments)     Feels like pins and needles    Meperidine     Sulfa Antibiotics Hives    Keflex [Cephalexin] Itching and Rash     Tolerates Meropenem.  Tape [place] Just Tape] Rash       Social History     Tobacco Use    Smoking status: Never Smoker    Smokeless tobacco: Never Used   Substance Use Topics    Alcohol use: No          Objective:      /82 (Site: Left Upper Arm, Position: Sitting, Cuff Size: Large Adult)   Pulse 79   Temp 96.9 °F (36.1 °C) (Infrared)   Wt 210 lb 12.8 oz (95.6 kg)   SpO2 98%   BMI 37.34 kg/m²   General: Alert and oriented, in no distress   S1 and S2 normal, no murmurs, clicks, gallops or rubs. Regular rate and rhythm. Chest is clear; no wheezes or rales. No edema or JVD.   heart sounds regular rate and rhythm, S1, S2 normal, no murmur, click, rub or gallop, chest clear, no hepatosplenomegaly, no carotid bruits, feet: normal DP and PT pulses, no trophic changes or ulcerative lesions and normal monofilament exam   A1c 7.1%  Assessment:      Essential hypertension - well controlled and stable  Diabetes--well controlled and stable   Hypothyroidism-asymptomatic  Anxiety -controlled  Depression-controlled  COPD-stable  Insomnia controlled  Migraines-controlled  Allergic rhinitis-controlled     Plan:       1)  Medication: continue current medication regimen unchanged  2)  Recheck in 6 months, sooner should new symptoms or problems arise. Amaya Conner received counseling on the following healthy behaviors: nutrition and exercise    Patient given educational materials on Diabetes    I have instructed Armin to complete a self tracking handout on Blood Sugars  and instructed them to bring it with them to her next appointment. Discussed use, benefit, and side effects of prescribed medications. Barriers to medication compliance addressed. All patient questions answered. Pt voiced understanding.

## 2020-11-25 LAB
ALBUMIN/GLOBULIN RATIO: 1.4 RATIO (ref 0.8–2.6)
ALBUMIN: 4.3 G/DL (ref 3.5–5.2)
ALP BLD-CCNC: 107 U/L (ref 23–144)
ALT SERPL-CCNC: 29 U/L (ref 0–60)
AST SERPL-CCNC: 51 U/L (ref 0–55)
BILIRUB SERPL-MCNC: 0.3 MG/DL (ref 0–1.2)
BUN BLDV-MCNC: 8 MG/DL (ref 3–29)
BUN/CREAT BLD: 10 (ref 7–25)
CALCIUM SERPL-MCNC: 9.7 MG/DL (ref 8.5–10.5)
CHLORIDE BLD-SCNC: 100 MEQ/L (ref 96–110)
CHOLESTEROL: 196 MG/DL
CO2: 26 MEQ/L (ref 19–32)
CREAT SERPL-MCNC: 0.8 MG/DL (ref 0.5–1.2)
CREATININE URINE: 27.4 MG/DL
FASTING STATUS: ABNORMAL
GFR AFRICAN AMERICAN: 89 MLS/MIN/1.73M2
GFR NON-AFRICAN AMERICAN: 77 MLS/MIN/1.73M2
GLOBULIN: 3 G/DL (ref 1.9–3.6)
GLUCOSE BLD-MCNC: 129 MG/DL (ref 70–99)
HDLC SERPL-MCNC: 52 MG/DL
LDL CHOLESTEROL CALCULATED: 124 MG/DL
MICROALBUMIN UR-MCNC: <3 MCG/DL
MICROALBUMIN/CREAT UR-RTO: NORMAL MCG/MG CREAT.
POTASSIUM SERPL-SCNC: 4 MEQ/L (ref 3.4–5.3)
SODIUM BLD-SCNC: 140 MEQ/L (ref 135–148)
STATUS: ABNORMAL
TOTAL PROTEIN: 7.3 G/DL (ref 6–8.3)
TRIGL SERPL-MCNC: 98 MG/DL
TSH SERPL DL<=0.05 MIU/L-ACNC: 0.16 MCIU/ML (ref 0.4–4.5)
VLDLC SERPL CALC-MCNC: 20 MG/DL (ref 4–38)

## 2020-12-02 ENCOUNTER — CARE COORDINATION (OUTPATIENT)
Dept: CARE COORDINATION | Age: 65
End: 2020-12-02

## 2020-12-02 NOTE — CARE COORDINATION
ACM received notification from PCP office that pt would like to speak with ACM. Call to pt home, pt expresses interest in getting signed up for social security disability, medicaid, etc.  States she is unable to afford her medications with her [facility] supplement plan. Pt is agreeable to outreach from [facility], will copy chart & follow up with pt in 1 week to ensure she has gotten started. ROSETTA SantiagoN RN  Ambulatory Care Manager  [phone] office/cell  [phone] fax  [email or web address]

## 2020-12-03 ENCOUNTER — CARE COORDINATION (OUTPATIENT)
Dept: CARE COORDINATION | Age: 65
End: 2020-12-03

## 2020-12-03 NOTE — CARE COORDINATION
Call to pt to initiate SW referral. Bridgett Bermudez to reach pt. M requesting call back.   ISRAEL Lucio, [facility]   [phone]

## 2020-12-08 ENCOUNTER — CARE COORDINATION (OUTPATIENT)
Dept: CARE COORDINATION | Age: 65
End: 2020-12-08

## 2020-12-08 NOTE — CARE COORDINATION
Attempt to initiate SW referral. Unable to reach pt. LM requesting call back. Second attempt.   ISRAEL Pablo, [facility]   [phone]

## 2020-12-10 ENCOUNTER — CARE COORDINATION (OUTPATIENT)
Dept: CARE COORDINATION | Age: 65
End: 2020-12-10

## 2020-12-10 NOTE — CARE COORDINATION
Incoming call from pt. BRYANT introduced. She reported that she needed assistance paying for Jardiance and Januvia. Offered to send PAPs. Pt explained that she was interested in obtaining SSD. Pt had Medicare questions, suggested she contact Medicare to change plans. Offered to email pt daughter info, pt suggested SW email her as well. PAPs for Jardiance and Januvia, SSD application website, Medicare website and [facility] emailed to pt and dtr.   ISRAEL Reed, [facility]   [phone]

## 2020-12-11 ENCOUNTER — CARE COORDINATION (OUTPATIENT)
Dept: CARE COORDINATION | Age: 65
End: 2020-12-11

## 2020-12-18 ENCOUNTER — CARE COORDINATION (OUTPATIENT)
Dept: CARE COORDINATION | Age: 65
End: 2020-12-18

## 2020-12-18 NOTE — CARE COORDINATION
F/u call attempted. Unable to reach pt, LVM requesting call back.   ISRAEL Pablo, [facility]   [phone]

## 2020-12-24 ENCOUNTER — HOSPITAL ENCOUNTER (EMERGENCY)
Age: 65
Discharge: HOME | End: 2020-12-24
Attending: EMERGENCY MEDICINE
Payer: MEDICARE

## 2020-12-24 VITALS
BODY MASS INDEX: 33.66 KG/M2 | OXYGEN SATURATION: 96 % | TEMPERATURE: 97.6 F | HEIGHT: 63 IN | WEIGHT: 190 LBS | SYSTOLIC BLOOD PRESSURE: 111 MMHG | HEART RATE: 82 BPM | RESPIRATION RATE: 16 BRPM | DIASTOLIC BLOOD PRESSURE: 74 MMHG

## 2020-12-24 PROCEDURE — 99284 EMERGENCY DEPT VISIT MOD MDM: CPT

## 2020-12-24 NOTE — ED PROVIDER NOTES
Emergency Department Attending Note    Tj Hernandez MD    Date of ED VIsit: 12/24/2020    CHIEF COMPLAINT  Epistaxis (Pt c/o \"nosebleed off and on x a few days\", not actively bleeding at this time)      Carmen Gallegos is a 77 y.o. female  With Vital signs of BP (!) 143/91   Pulse 99   Temp 97.6 °F (36.4 °C) (Oral)   Resp 18   Ht 5' 3\" (1.6 m)   Wt 190 lb (86.2 kg)   SpO2 (!) 86%   BMI 33.66 kg/m²  who presents to the ED with a complaint of epistaxis. Patient seen and evaluated in room 6. Patient's had 3 nosebleeds today. The last 1 took about an hour to control but even after controlling it she decided to come in to get checked out I am not quite sure why. Especially since there is no active bleeding at this point. Patient states she does not blow her nose. She is used Afrin. All that is apparently stopped her bleeding. She is on nasal cannula oxygen for COPD. She states that she uses a water tank to humidify the oxygen. She was seen here about 2 or 3 weeks ago for epistaxis as well. This has been an on and off again occurrence over the past few days. .  No other complaints, modifying factors or associated symptoms. Patients Past medical history reviewed and listed below  Past Medical History:   Diagnosis Date    A[facility]fib ([facility]     Anxiety     Cryptogenic organizing pneumonia ([facility]     Depression     GERD (gastroesophageal reflux disease)     Hyperlipidemia     On home oxygen therapy     uses O2 3L prn    Rash     Thyroid disease     hypothyroidism     Past Surgical History:   Procedure Laterality Date    ARM SURGERY Left 3/2/2020    LEFT ULNAR NERVE DECOMPRESSION AT THE ELBOW performed by Mar Stover MD at [facility] 6/27/2019    EBUS WF W/ANES.  performed by Perry Cisse MD at [address]  6/27/2019    BRONCHOSCOPY/TRANSBRONCHIAL NEEDLE BIOPSY performed by Perry Cisse MD at [address] [facility] BRONCHOSCOPY  6/27/2019    BRONCHOSCOPY/TRANSBRONCHIAL LUNG BIOPSY performed by Bekah Katz MD at [address] [place] [address]  6/27/2019    BRONCHOSCOPY ALVEOLAR LAVAGE performed by Bekah Katz MD at [address] [place] [address]  07/10/2019    BRONCHOSCOPY N/A 7/10/2019    BRONCHOSCOPY ALVEOLAR LAVAGE performed by Nicolette Lutz MD at [address] [place] [address] Bilateral 08/06/2019    BRONCHOSCOPY N/A 8/6/2019    BRONCHOSCOPY ALVEOLAR LAVAGE performed by Meagan Alanis MD at [address] [place] [address] N/A 12/24/2019    BRONCHOSCOPY ALVEOLAR LAVAGE performed by Bairon Erazo MD at [address] [place] [address], TOTAL ABDOMINAL      partial       I have reviewed the following from the nursing documentation.     Family History   Problem Relation Age of Onset    Diabetes Mother     High Blood Pressure Mother     Asthma Sister     Other Father      Social History     Socioeconomic History    Marital status:      Spouse name: Not on file    Number of children: 9    Years of education: Not on file    Highest education level: Not on file   Occupational History    Not on file   Social Needs    Financial resource strain: Not on file    Food insecurity     Worry: Not on file     Inability: Not on file   [employer] needs     Medical: Not on file     Non-medical: Not on file   Tobacco Use    Smoking status: Never Smoker    Smokeless tobacco: Never Used   Substance and Sexual Activity    Alcohol use: No    Drug use: No    Sexual activity: Not Currently   Lifestyle    Physical activity     Days per week: 0 days     Minutes per session: Not on file    Stress: Not on file   Relationships    Social connections     Talks on phone: Not on file     Gets together: Not on file     Attends Mosque service: Not on file     Active member of club or organization: Not on file     Attends meetings of clubs or organizations: Not on file Relationship status: Not on file    Intimate partner violence     Fear of current or ex partner: Not on file     Emotionally abused: Not on file     Physically abused: Not on file     Forced sexual activity: Not on file   Other Topics Concern    Not on file   Social History Narrative    Not on file     No current facility-administered medications for this encounter.       Current Outpatient Medications   Medication Sig Dispense Refill    promethazine-dextromethorphan (PROMETHAZINE-DM) 6.25-15 MG/5ML syrup Take 5 mLs by mouth 4 times daily as needed for Cough 240 mL 0    loratadine-pseudoephedrine (CLARITIN-D 12 HOUR) 5-120 MG per extended release tablet Take 1 tablet by mouth 2 times daily 180 tablet 1    SITagliptin (JANUVIA) 100 MG tablet Take 1 tablet by mouth daily 90 tablet 1    empagliflozin (JARDIANCE) 25 MG tablet Take 1 tablet by mouth daily 90 tablet 1    pantoprazole (PROTONIX) 40 MG tablet Take 1 tablet by mouth every morning (before breakfast) 90 tablet 1    sertraline (ZOLOFT) 100 MG tablet Take 2 tablets by mouth daily 180 tablet 1    mirtazapine (REMERON) 30 MG tablet Take 1 tablet by mouth nightly 90 tablet 1    traZODone (DESYREL) 50 MG tablet TAKE 1 TABLET BY MOUTH EVERY DAY AT NIGHT 90 tablet 1    amitriptyline (ELAVIL) 25 MG tablet TAKE 1 TABLET BY MOUTH EVERY DAY AT NIGHT 90 tablet 1    levothyroxine (SYNTHROID) 150 MCG tablet Take 1 tablet by mouth Daily 90 tablet 1    furosemide (LASIX) 40 MG tablet TAKE 1 TABLET BY MOUTH EVERY DAY 90 tablet 1    nadolol (CORGARD) 20 MG tablet Take 1 tablet by mouth daily 90 tablet 1    potassium chloride (KLOR-CON M) 10 MEQ extended release tablet Take 2 tablets by mouth daily TAKE 1 TABLET BY MOUTH EVERY  tablet 1    benzonatate (TESSALON) 200 MG capsule TAKE 1 CAPSULE BY MOUTH 3 TIMES A DAY AS NEEDED FOR COUGH 90 capsule 5    diclofenac sodium (VOLTAREN) 1 % GEL Apply 2 g topically 4 times daily 2 Tube 1    albuterol sulfate HFA

## 2021-01-04 ENCOUNTER — CARE COORDINATION (OUTPATIENT)
Dept: CARE COORDINATION | Age: 66
End: 2021-01-04

## 2021-01-04 NOTE — CARE COORDINATION
Attempt to f/u on resources provided. LVM providing contact info if any further assistance is needed. Final outreach.   ISRAEL Alcala, [facility]   [phone]

## 2021-01-12 ENCOUNTER — HOSPITAL ENCOUNTER (OUTPATIENT)
Dept: CT IMAGING | Age: 66
Discharge: HOME | End: 2021-01-12
Payer: MEDICARE

## 2021-01-12 DIAGNOSIS — J84.116 CRYPTOGENIC ORGANIZING PNEUMONIA (HCC): ICD-10-CM

## 2021-01-12 PROCEDURE — 71250 CT THORAX DX C-: CPT

## 2021-01-19 ENCOUNTER — VIRTUAL VISIT (OUTPATIENT)
Dept: PULMONOLOGY | Age: 66
End: 2021-01-19
Payer: MEDICARE

## 2021-01-19 DIAGNOSIS — J96.11 CHRONIC HYPOXEMIC RESPIRATORY FAILURE (HCC): Primary | ICD-10-CM

## 2021-01-19 DIAGNOSIS — J84.9 ILD (INTERSTITIAL LUNG DISEASE) (HCC): ICD-10-CM

## 2021-01-19 DIAGNOSIS — J84.116 CRYPTOGENIC ORGANIZING PNEUMONIA (HCC): ICD-10-CM

## 2021-01-19 PROCEDURE — 99442 PR PHYS/QHP TELEPHONE EVALUATION 11-20 MIN: CPT | Performed by: INTERNAL MEDICINE

## 2021-01-19 RX ORDER — LORATADINE 10 MG
2 CAPSULE ORAL 2 TIMES DAILY
COMMUNITY
End: 2021-05-11

## 2021-01-19 NOTE — PROGRESS NOTES
Esvin Sadler is a 77 y.o. female evaluated via telephone on 1/19/2021. Consent:  She and/or health care decision maker is aware that that she may receive a bill for this telephone service, depending on her insurance coverage, and has provided verbal consent to proceed: Yes      Documentation:  I communicated with the patient and/or health care decision maker about sinus congestion and a cough. Details of this discussion including any medical advice provided:     1/12/20 Chest CT 1/12/21:   Lungs/pleura: The central airways are patent. Scattered areas of ground-glass   type opacity with associated architectural distortion and irregular   subpleural opacities are not significantly changed when compared to the most   recent exam. [place] scattered areas of traction bronchiectasis are similar   to the previous.  When compared to the more remote exam there is overall   improvement of ground-glass opacity which may well have been related to   superimposed infiltrate. ASSESSMENT:  · Cryptogenic organizing pneumonia   · Chronic hypoxic respiratory failure -resolved  · Cough and Fever has been intermittently associated with her condition  · Restrictive lung disease  · Chronic Rhinitis with PND   · Obesity  · Afib     PLAN:   · Rx for tessalon pearles  · Her condition has been steroid responsive  · Completed Prednisone 40mg for 4-6 weeks, 30mg for 1 month, 20mg for 1 month, 15mg for 6 weeks, 10mg for 6 weeks, 5mg for 8 weeks, 2.5mg daily for 2 weeks and  2.5mg QOD for 4 weeks. Stopped Prednisone 5/2020  · Continue sinus regimen: flonase and Claritin   · Continue Lasix  · PRN albuterol and albuterol nebs  · Mold remediated  · CTPA done in the emergency room at the end of September showed stable changes and she does not need the currently scheduled scan in November.   F/u in 3 months    I affirm this is a Patient Initiated Episode with a Patient who has not had a related appointment within my department in the past 7 days or scheduled within the next 24 hours.   Patient identification was verified at the start of the visit: Yes  Total Time: 11 min      FAHAD CALABRESE

## 2021-02-04 ENCOUNTER — OFFICE VISIT (OUTPATIENT)
Dept: FAMILY MEDICINE CLINIC | Age: 66
End: 2021-02-04
Payer: MEDICARE

## 2021-02-04 ENCOUNTER — NURSE ONLY (OUTPATIENT)
Dept: FAMILY MEDICINE CLINIC | Age: 66
End: 2021-02-04

## 2021-02-04 VITALS
OXYGEN SATURATION: 95 % | SYSTOLIC BLOOD PRESSURE: 130 MMHG | DIASTOLIC BLOOD PRESSURE: 80 MMHG | BODY MASS INDEX: 36.56 KG/M2 | WEIGHT: 206.4 LBS | HEART RATE: 98 BPM | TEMPERATURE: 97.6 F

## 2021-02-04 VITALS — TEMPERATURE: 97.3 F

## 2021-02-04 DIAGNOSIS — E03.9 HYPOTHYROIDISM, UNSPECIFIED TYPE: Primary | ICD-10-CM

## 2021-02-04 DIAGNOSIS — Z23 NEED FOR VACCINATION AGAINST PERTUSSIS: ICD-10-CM

## 2021-02-04 DIAGNOSIS — R04.0 EPISTAXIS: Primary | ICD-10-CM

## 2021-02-04 DIAGNOSIS — Z91.14 OVERUSE OF MEDICATION: ICD-10-CM

## 2021-02-04 PROCEDURE — 99214 OFFICE O/P EST MOD 30 MIN: CPT | Performed by: FAMILY MEDICINE

## 2021-02-04 PROCEDURE — G8417 CALC BMI ABV UP PARAM F/U: HCPCS | Performed by: FAMILY MEDICINE

## 2021-02-04 PROCEDURE — 3017F COLORECTAL CA SCREEN DOC REV: CPT | Performed by: FAMILY MEDICINE

## 2021-02-04 PROCEDURE — 4040F PNEUMOC VAC/ADMIN/RCVD: CPT | Performed by: FAMILY MEDICINE

## 2021-02-04 PROCEDURE — G8428 CUR MEDS NOT DOCUMENT: HCPCS | Performed by: FAMILY MEDICINE

## 2021-02-04 PROCEDURE — 90471 IMMUNIZATION ADMIN: CPT | Performed by: FAMILY MEDICINE

## 2021-02-04 PROCEDURE — 1090F PRES/ABSN URINE INCON ASSESS: CPT | Performed by: FAMILY MEDICINE

## 2021-02-04 PROCEDURE — 30901 CONTROL OF NOSEBLEED: CPT | Performed by: FAMILY MEDICINE

## 2021-02-04 PROCEDURE — G8484 FLU IMMUNIZE NO ADMIN: HCPCS | Performed by: FAMILY MEDICINE

## 2021-02-04 PROCEDURE — 1036F TOBACCO NON-USER: CPT | Performed by: FAMILY MEDICINE

## 2021-02-04 PROCEDURE — 1123F ACP DISCUSS/DSCN MKR DOCD: CPT | Performed by: FAMILY MEDICINE

## 2021-02-04 PROCEDURE — 90715 TDAP VACCINE 7 YRS/> IM: CPT | Performed by: FAMILY MEDICINE

## 2021-02-04 PROCEDURE — G8400 PT W/DXA NO RESULTS DOC: HCPCS | Performed by: FAMILY MEDICINE

## 2021-02-04 ASSESSMENT — PATIENT HEALTH QUESTIONNAIRE - PHQ9
SUM OF ALL RESPONSES TO PHQ QUESTIONS 1-9: 0
SUM OF ALL RESPONSES TO PHQ QUESTIONS 1-9: 0
2. FEELING DOWN, DEPRESSED OR HOPELESS: 0

## 2021-02-04 NOTE — PROGRESS NOTES
Acute Epistaxis: Patient presents with a nosebleed from right nostril. It is associated with no specific cause. Patient states she has been having nosebleeds off and on since just before Christmas. She was seen at urgent care, and states she had a very minimal exam.  She was told to use Afrin when she has a nosebleed. She is been using Afrin 6-8 times per week for this but she still has nosebleeds. She was not told not to blow her nose. Denies trauma or previous episodes. Patient also had a recent grandchild, and needs to have her whooping cough vaccine. O:  Vitals:    02/04/21 1325   BP: 130/80   Pulse: 98   Temp: 97.6 °F (36.4 °C)   SpO2: 95%     No acute distress. Alert and Oriented x 3  HEENT: Atraumatic. Normocephalic. PERRLA, EOMI, Conjunctiva clear  Oropharynx clear, mucosa moist.  Nasal mucosa with area of bleeding distal right nasal septum  NECK: without thyromegaly, lymphadenopathy, JVD  LUNGS:Clear to ascultation bilaterally. Breathing comfortably  CARDIOVASCULAR:  Regular rate and rhythm, no murmurs, rubs, or gallops      A:    Diagnosis Orders   1. Epistaxis  56628 - MI CTRL NOSEBLEED,ANTER,SIMPLE   2. Overuse of medication     3. Need for vaccination against pertussis       P: Area of bleeding was cauterized with silver nitrate sticks x5. Bleeding had resolved after fifth application. Patient is use nasal saline to keep nasal mucosa moist.  Avoid blowing or rubbing nose. Patient to avoid using Afrin. Boostrix vaccination given. Follow-up as needed.

## 2021-02-04 NOTE — PATIENT INSTRUCTIONS
Patient Education        Nosebleeds: Care Instructions  Your Care Instructions     Nosebleeds are common, especially if you have colds or allergies. Many things can cause a nosebleed. Some nosebleeds stop on their own with pressure. Others need packing. Some get cauterized (sealed). If you have gauze or other packing materials in your nose, you will need to follow up with your doctor to have the packing removed. You may need more treatment if you get nosebleeds a lot. The doctor has checked you carefully, but problems can develop later. If you notice any problems or new symptoms, get medical treatment right away. Follow-up care is a key part of your treatment and safety. Be sure to make and go to all appointments, and call your doctor if you are having problems. It's also a good idea to know your test results and keep a list of the medicines you take. How can you care for yourself at home? · If you get another nosebleed:  ? Sit up and tilt your head slightly forward. This keeps blood from going down your throat. ? Use your thumb and index finger to pinch your nose shut for 10 minutes. Use a clock. Do not check to see if the bleeding has stopped before the 10 minutes are up. If the bleeding has not stopped, pinch your nose shut for another 10 minutes. ? When the bleeding has stopped, try not to pick, rub, or blow your nose for 12 hours. Avoiding these things helps keep your nose from bleeding again. · If your doctor prescribed antibiotics, take them as directed. Do not stop taking them just because you feel better. You need to take the full course of antibiotics. To prevent nosebleeds  · Do not blow your nose too hard. · Try not to lift or strain after a nosebleed. · Raise your head on a pillow while you sleep. · Put a thin layer of a saline- or water-based nasal gel, such as NasoGel, inside your nose. Put it on the septum, which divides your nostrils.  This will prevent dryness that can cause nosebleeds. · Use a vaporizer or humidifier to add moisture to your bedroom. Follow the directions for cleaning the machine. · Do not use aspirin, ibuprofen (Advil, Motrin), or naproxen (Aleve) for 36 to 48 hours after a nosebleed unless your doctor tells you to. You can use acetaminophen (Tylenol) for pain relief. · Talk to your doctor about stopping any other medicines you are taking. Some medicines may make you more likely to get a nosebleed. · Do not use cold medicines or nasal sprays without first talking to your doctor. They can make your nose dry. When should you call for help? Call 911 anytime you think you may need emergency care. For example, call if:    · You passed out (lost consciousness). Call your doctor now or seek immediate medical care if:    · You get another nosebleed and your nose is still bleeding after you have applied pressure 3 times for 10 minutes each time (30 minutes total).     · There is a lot of blood running down the back of your throat even after you pinch your nose and tilt your head forward.     · You have a fever.     · You have sinus pain. Watch closely for changes in your health, and be sure to contact your doctor if:    · You get nosebleeds often, even if they stop.     · You do not get better as expected. Where can you learn more? Go to [email or web address] and sign in to your [employer] account. Enter S156 in the [employer] box to learn more about \"Nosebleeds: Care Instructions. \"     If you do not have an account, please click on the \"Sign Up Now\" link. Current as of: June 26, 2019               Content Version: 12.6  © 7977-9835 [employer]. Care instructions adapted under license by [facility] ([facility]). If you have questions about a medical condition or this instruction, always ask your healthcare professional. [address] any warranty or liability for your use of this information.

## 2021-02-05 LAB — TSH SERPL DL<=0.05 MIU/L-ACNC: 12.6 MCIU/ML (ref 0.4–4.5)

## 2021-02-17 DIAGNOSIS — J98.4 RESTRICTIVE LUNG DISEASE: ICD-10-CM

## 2021-02-17 RX ORDER — ALBUTEROL SULFATE 2.5 MG/3ML
SOLUTION RESPIRATORY (INHALATION)
Qty: 750 ML | Refills: 1 | Status: SHIPPED | OUTPATIENT
Start: 2021-02-17 | End: 2021-07-21

## 2021-03-23 ENCOUNTER — OFFICE VISIT (OUTPATIENT)
Dept: FAMILY MEDICINE CLINIC | Age: 66
End: 2021-03-23
Payer: MEDICARE

## 2021-03-23 VITALS
HEART RATE: 118 BPM | OXYGEN SATURATION: 91 % | BODY MASS INDEX: 36.26 KG/M2 | SYSTOLIC BLOOD PRESSURE: 124 MMHG | DIASTOLIC BLOOD PRESSURE: 82 MMHG | TEMPERATURE: 97.1 F | WEIGHT: 204.7 LBS

## 2021-03-23 DIAGNOSIS — L29.9 ITCHING: ICD-10-CM

## 2021-03-23 DIAGNOSIS — J44.9 CHRONIC OBSTRUCTIVE PULMONARY DISEASE, UNSPECIFIED COPD TYPE (HCC): ICD-10-CM

## 2021-03-23 DIAGNOSIS — E03.9 ACQUIRED HYPOTHYROIDISM: Primary | ICD-10-CM

## 2021-03-23 PROCEDURE — 3023F SPIROM DOC REV: CPT | Performed by: FAMILY MEDICINE

## 2021-03-23 PROCEDURE — G8417 CALC BMI ABV UP PARAM F/U: HCPCS | Performed by: FAMILY MEDICINE

## 2021-03-23 PROCEDURE — 3017F COLORECTAL CA SCREEN DOC REV: CPT | Performed by: FAMILY MEDICINE

## 2021-03-23 PROCEDURE — G8926 SPIRO NO PERF OR DOC: HCPCS | Performed by: FAMILY MEDICINE

## 2021-03-23 PROCEDURE — 1090F PRES/ABSN URINE INCON ASSESS: CPT | Performed by: FAMILY MEDICINE

## 2021-03-23 PROCEDURE — 4040F PNEUMOC VAC/ADMIN/RCVD: CPT | Performed by: FAMILY MEDICINE

## 2021-03-23 PROCEDURE — 1036F TOBACCO NON-USER: CPT | Performed by: FAMILY MEDICINE

## 2021-03-23 PROCEDURE — G8427 DOCREV CUR MEDS BY ELIG CLIN: HCPCS | Performed by: FAMILY MEDICINE

## 2021-03-23 PROCEDURE — 99214 OFFICE O/P EST MOD 30 MIN: CPT | Performed by: FAMILY MEDICINE

## 2021-03-23 PROCEDURE — G8484 FLU IMMUNIZE NO ADMIN: HCPCS | Performed by: FAMILY MEDICINE

## 2021-03-23 PROCEDURE — G8400 PT W/DXA NO RESULTS DOC: HCPCS | Performed by: FAMILY MEDICINE

## 2021-03-23 PROCEDURE — 1123F ACP DISCUSS/DSCN MKR DOCD: CPT | Performed by: FAMILY MEDICINE

## 2021-03-23 RX ORDER — HYDROXYZINE HYDROCHLORIDE 10 MG/1
TABLET, FILM COATED ORAL
Qty: 90 TABLET | Refills: 1 | Status: ON HOLD | OUTPATIENT
Start: 2021-03-23 | End: 2022-05-08 | Stop reason: SDUPTHER

## 2021-03-23 ASSESSMENT — PATIENT HEALTH QUESTIONNAIRE - PHQ9
SUM OF ALL RESPONSES TO PHQ QUESTIONS 1-9: 0
SUM OF ALL RESPONSES TO PHQ9 QUESTIONS 1 & 2: 0
SUM OF ALL RESPONSES TO PHQ QUESTIONS 1-9: 0
SUM OF ALL RESPONSES TO PHQ QUESTIONS 1-9: 0
2. FEELING DOWN, DEPRESSED OR HOPELESS: 0

## 2021-03-23 NOTE — PATIENT INSTRUCTIONS
Patient Education        Chronic Obstructive Pulmonary Disease (COPD): Care Instructions  Your Care Instructions     Chronic obstructive pulmonary disease (COPD) is a general term for a group of lung diseases, including emphysema and chronic bronchitis. People with COPD have decreased airflow in and out of the lungs, which makes it hard to breathe. The airways also can get clogged with thick mucus. Cigarette smoking is a major cause of COPD. Although there is no cure for COPD, you can slow its progress. Following your treatment plan and taking care of yourself can help you feel better and live longer. Follow-up care is a key part of your treatment and safety. Be sure to make and go to all appointments, and call your doctor if you are having problems. It's also a good idea to know your test results and keep a list of the medicines you take. How can you care for yourself at home? Staying healthy    · Do not smoke. This is the most important step you can take to prevent more damage to your lungs. If you need help quitting, talk to your doctor about stop-smoking programs and medicines. These can increase your chances of quitting for good.     · Avoid colds and flu. Get a pneumococcal vaccine shot. If you have had one before, ask your doctor whether you need a second dose. Get the flu vaccine every fall. If you must be around people with colds or the flu, wash your hands often.     · Avoid secondhand smoke, air pollution, and high altitudes. Also avoid cold, dry air and hot, humid air. Stay at home with your windows closed when air pollution is bad. Medicines and oxygen therapy    · Take your medicines exactly as prescribed. Call your doctor if you think you are having a problem with your medicine. You may be taking medicines such as:  ? Bronchodilators. These help open your airways and make breathing easier. They are either short-acting (work for 6 to 9 hours) or long-acting (work for 24 hours).  You inhale most bronchodilators, so they start to act quickly. Always carry your quick-relief inhaler with you in case you need it while you are away from home. ? Corticosteroids (prednisone, budesonide). These reduce airway inflammation. They come in pill or inhaled form. You must take these medicines every day for them to work well.     · Ask your doctor or pharmacist if a spacer is right for you. A spacer may help you get more inhaled medicine to your lungs. If you use one, ask how to use it properly.     · Do not take any vitamins, over-the-counter medicine, or herbal products without talking to your doctor first.     · If your doctor prescribed antibiotics, take them as directed. Do not stop taking them just because you feel better. You need to take the full course of antibiotics.     · If you use oxygen therapy, use the flow rate your doctor has recommended. Don't change it without talking to your doctor first. Oxygen therapy boosts the amount of oxygen in your blood and helps you breathe easier. Activity    · Get regular exercise. Walking is an easy way to get exercise. Start out slowly, and walk a little more each day.     · Pay attention to your breathing. You are exercising too hard if you can't talk while you exercise.     · Take short rest breaks when doing household chores and other activities.     · Learn breathing methods--such as breathing through pursed lips--to help you become less short of breath.     · If your doctor has not set you up with a pulmonary rehabilitation program, ask if rehab is right for you. Rehab includes exercise programs, education about your disease and how to manage it, help with diet and other changes, and emotional support. Diet    · Eat regular, healthy meals. Use bronchodilators about 1 hour before you eat to make it easier to eat. Eat several small meals instead of three large ones.  Drink beverages at the end of the meal. Avoid foods that are hard to chew.     · Eat foods that contain protein so you don't lose muscle mass.     · Talk with your doctor if you gain too much weight or if you lose weight without trying. Mental health    · Talk to your family, friends, or a therapist about your feelings. Some people feel frightened, angry, hopeless, helpless, and even guilty. Talking openly about bad feelings can help you cope. If these feelings last, talk to your doctor. When should you call for help? Call 911 anytime you think you may need emergency care. For example, call if:    · You have severe trouble breathing. Call your doctor now or seek immediate medical care if:    · You have new or worse trouble breathing.     · You cough up blood.     · You have a fever. Watch closely for changes in your health, and be sure to contact your doctor if:    · You cough more deeply or more often, especially if you notice more mucus or a change in the color of your mucus.     · You have new or worse swelling in your legs or belly.     · You are not getting better as expected. Where can you learn more? Go to https://WiMi[employer] org and sign in to your [employer] account. Enter D661 in the TermScout box to learn more about \"Chronic Obstructive Pulmonary Disease (COPD): Care Instructions. \"     If you do not have an account, please click on the \"Sign Up Now\" link. Current as of: October 26, 2020               Content Version: 12.8  © 2546-0126 Healthwise, Incorporated. Care instructions adapted under license by [facility] ([facility]). If you have questions about a medical condition or this instruction, always ask your healthcare professional. [address] any warranty or liability for your use of this information.

## 2021-03-23 NOTE — PROGRESS NOTES
SUBJECTIVE: Wilson Danielle is a 74 y.o. female here for follow up of hypothyroidism. Scheduled Meds: levothyroxine 150 mcg      Lab Results   Component Value Date    TSH 12.600 (H) 02/04/2021     Thyroid ROS: denies fatigue, weight changes, heat/cold intolerance, bowel/skin changes or CVS symptoms. COPD currently on 3LNC. Mild dyspnea. Patient left her oxygen in the car, and her pulse ox was 79% on room air. Oxygenation went to 91% on 3 L. Intermittent itching. This has been recurring for several years. She has found answer house, and she thinks it psychological but she is itching all over now. OBJECTIVE:   Vitals:    03/23/21 1129   BP: 124/82   Pulse: 118   Temp: 97.1 °F (36.2 °C)   SpO2: 91%     No acute distress. Alert and Oriented x 3  HEENT: Atraumatic. Normocephalic. PERRLA, EOMI, Conjunctiva clear  Oropharynx clear, mucosa moist.  Nasal mucosa normal  NECK: without thyromegaly, lymphadenopathy, JVD  LUNGS: Mild expiratory wheezing throughout. breathing comfortably  CARDIOVASCULAR:  Regular rate and rhythm, no murmurs, rubs, or gallops  NEURO: Cranial nerves II-XII grossly intact. Strength 5/5, DTR 2/4. SKIN: Warm, Dry, No rash. PSYCH: Mood and Affect normal.    ASSESSMENT:     Diagnosis Orders   1. Acquired hypothyroidism  TSH without Reflex   2. Itching  hydrOXYzine (ATARAX) 10 MG tablet   3. Chronic obstructive pulmonary disease, unspecified COPD type ([facility] [address]       PLAN: current treatment plan effective, no change in therapy  orders as documented in EMR  need for compliance with treatment plan to achieve optimal outcome discussed  reviewed medications and side effects in detail  reviewed potential future medication changes and possible side effects thereof  return to clinic in 2 months.

## 2021-03-24 DIAGNOSIS — E03.9 ACQUIRED HYPOTHYROIDISM: ICD-10-CM

## 2021-03-24 LAB — TSH SERPL DL<=0.05 MIU/L-ACNC: 0.19 MCIU/ML (ref 0.4–4.5)

## 2021-03-24 RX ORDER — LEVOTHYROXINE SODIUM 0.12 MG/1
125 TABLET ORAL DAILY
Qty: 30 TABLET | Refills: 1 | Status: SHIPPED | OUTPATIENT
Start: 2021-03-24 | End: 2021-05-21

## 2021-04-06 ENCOUNTER — TELEPHONE (OUTPATIENT)
Dept: FAMILY MEDICINE CLINIC | Age: 66
End: 2021-04-06

## 2021-04-06 RX ORDER — ALBUTEROL SULFATE 90 UG/1
2 AEROSOL, METERED RESPIRATORY (INHALATION) EVERY 6 HOURS PRN
Qty: 3 INHALER | Refills: 1 | Status: SHIPPED | OUTPATIENT
Start: 2021-04-06 | End: 2021-06-16 | Stop reason: SDUPTHER

## 2021-05-11 ENCOUNTER — OFFICE VISIT (OUTPATIENT)
Dept: FAMILY MEDICINE CLINIC | Age: 66
End: 2021-05-11
Payer: MEDICARE

## 2021-05-11 VITALS
OXYGEN SATURATION: 95 % | SYSTOLIC BLOOD PRESSURE: 138 MMHG | BODY MASS INDEX: 36.14 KG/M2 | DIASTOLIC BLOOD PRESSURE: 80 MMHG | HEART RATE: 115 BPM | TEMPERATURE: 96.3 F | WEIGHT: 204 LBS

## 2021-05-11 VITALS — TEMPERATURE: 96.3 F

## 2021-05-11 DIAGNOSIS — M54.31 SCIATICA OF RIGHT SIDE: ICD-10-CM

## 2021-05-11 DIAGNOSIS — F32.0 MILD SINGLE CURRENT EPISODE OF MAJOR DEPRESSIVE DISORDER (HCC): ICD-10-CM

## 2021-05-11 DIAGNOSIS — E11.9 TYPE 2 DIABETES MELLITUS WITHOUT COMPLICATION, WITHOUT LONG-TERM CURRENT USE OF INSULIN (HCC): Primary | ICD-10-CM

## 2021-05-11 DIAGNOSIS — G43.009 MIGRAINE WITHOUT AURA AND WITHOUT STATUS MIGRAINOSUS, NOT INTRACTABLE: ICD-10-CM

## 2021-05-11 DIAGNOSIS — F51.04 PSYCHOPHYSIOLOGICAL INSOMNIA: ICD-10-CM

## 2021-05-11 DIAGNOSIS — F41.9 ANXIETY: ICD-10-CM

## 2021-05-11 DIAGNOSIS — E03.9 ACQUIRED HYPOTHYROIDISM: ICD-10-CM

## 2021-05-11 DIAGNOSIS — Z00.00 ROUTINE GENERAL MEDICAL EXAMINATION AT A HEALTH CARE FACILITY: Primary | ICD-10-CM

## 2021-05-11 LAB — HBA1C MFR BLD: 5.8 %

## 2021-05-11 PROCEDURE — G8427 DOCREV CUR MEDS BY ELIG CLIN: HCPCS | Performed by: FAMILY MEDICINE

## 2021-05-11 PROCEDURE — 3044F HG A1C LEVEL LT 7.0%: CPT | Performed by: FAMILY MEDICINE

## 2021-05-11 PROCEDURE — 99214 OFFICE O/P EST MOD 30 MIN: CPT | Performed by: FAMILY MEDICINE

## 2021-05-11 PROCEDURE — 36415 COLL VENOUS BLD VENIPUNCTURE: CPT | Performed by: FAMILY MEDICINE

## 2021-05-11 PROCEDURE — 3017F COLORECTAL CA SCREEN DOC REV: CPT | Performed by: FAMILY MEDICINE

## 2021-05-11 PROCEDURE — 1090F PRES/ABSN URINE INCON ASSESS: CPT | Performed by: FAMILY MEDICINE

## 2021-05-11 PROCEDURE — 83036 HEMOGLOBIN GLYCOSYLATED A1C: CPT | Performed by: FAMILY MEDICINE

## 2021-05-11 PROCEDURE — 2022F DILAT RTA XM EVC RTNOPTHY: CPT | Performed by: FAMILY MEDICINE

## 2021-05-11 PROCEDURE — G8417 CALC BMI ABV UP PARAM F/U: HCPCS | Performed by: FAMILY MEDICINE

## 2021-05-11 PROCEDURE — G0439 PPPS, SUBSEQ VISIT: HCPCS | Performed by: FAMILY MEDICINE

## 2021-05-11 PROCEDURE — 1036F TOBACCO NON-USER: CPT | Performed by: FAMILY MEDICINE

## 2021-05-11 PROCEDURE — G8400 PT W/DXA NO RESULTS DOC: HCPCS | Performed by: FAMILY MEDICINE

## 2021-05-11 PROCEDURE — 4040F PNEUMOC VAC/ADMIN/RCVD: CPT | Performed by: FAMILY MEDICINE

## 2021-05-11 PROCEDURE — 1123F ACP DISCUSS/DSCN MKR DOCD: CPT | Performed by: FAMILY MEDICINE

## 2021-05-11 RX ORDER — BACLOFEN 10 MG/1
10 TABLET ORAL 3 TIMES DAILY
Qty: 60 TABLET | Refills: 0 | Status: ON HOLD | OUTPATIENT
Start: 2021-05-11 | End: 2021-06-20 | Stop reason: HOSPADM

## 2021-05-11 RX ORDER — MIRTAZAPINE 30 MG/1
30 TABLET, FILM COATED ORAL NIGHTLY
Qty: 90 TABLET | Refills: 1 | Status: SHIPPED | OUTPATIENT
Start: 2021-05-11 | End: 2021-05-26 | Stop reason: SDUPTHER

## 2021-05-11 RX ORDER — FUROSEMIDE 40 MG/1
TABLET ORAL
Qty: 90 TABLET | Refills: 1 | Status: SHIPPED | OUTPATIENT
Start: 2021-05-11 | End: 2021-05-26 | Stop reason: SDUPTHER

## 2021-05-11 RX ORDER — POTASSIUM CHLORIDE 750 MG/1
20 TABLET, EXTENDED RELEASE ORAL DAILY
Qty: 180 TABLET | Refills: 1 | Status: SHIPPED | OUTPATIENT
Start: 2021-05-11 | End: 2021-05-26 | Stop reason: SDUPTHER

## 2021-05-11 RX ORDER — NADOLOL 20 MG/1
20 TABLET ORAL DAILY
Qty: 90 TABLET | Refills: 1 | Status: SHIPPED | OUTPATIENT
Start: 2021-05-11 | End: 2021-05-26 | Stop reason: SDUPTHER

## 2021-05-11 RX ORDER — AMITRIPTYLINE HYDROCHLORIDE 25 MG/1
TABLET, FILM COATED ORAL
Qty: 90 TABLET | Refills: 1 | Status: SHIPPED | OUTPATIENT
Start: 2021-05-11 | End: 2021-05-26 | Stop reason: SDUPTHER

## 2021-05-11 RX ORDER — SERTRALINE HYDROCHLORIDE 100 MG/1
200 TABLET, FILM COATED ORAL DAILY
Qty: 180 TABLET | Refills: 1 | Status: SHIPPED | OUTPATIENT
Start: 2021-05-11 | End: 2021-05-26 | Stop reason: SDUPTHER

## 2021-05-11 RX ORDER — TRAZODONE HYDROCHLORIDE 50 MG/1
TABLET ORAL
Qty: 90 TABLET | Refills: 1 | Status: SHIPPED | OUTPATIENT
Start: 2021-05-11 | End: 2021-05-26 | Stop reason: SDUPTHER

## 2021-05-11 RX ORDER — PANTOPRAZOLE SODIUM 40 MG/1
40 TABLET, DELAYED RELEASE ORAL
Qty: 90 TABLET | Refills: 1 | Status: SHIPPED | OUTPATIENT
Start: 2021-05-11 | End: 2021-05-26 | Stop reason: SDUPTHER

## 2021-05-11 RX ORDER — NAPROXEN 500 MG/1
500 TABLET ORAL 2 TIMES DAILY WITH MEALS
Qty: 60 TABLET | Refills: 0 | Status: ON HOLD | OUTPATIENT
Start: 2021-05-11 | End: 2021-06-20 | Stop reason: HOSPADM

## 2021-05-11 ASSESSMENT — LIFESTYLE VARIABLES: HOW OFTEN DO YOU HAVE A DRINK CONTAINING ALCOHOL: 0

## 2021-05-11 ASSESSMENT — PATIENT HEALTH QUESTIONNAIRE - PHQ9
SUM OF ALL RESPONSES TO PHQ9 QUESTIONS 1 & 2: 0
SUM OF ALL RESPONSES TO PHQ QUESTIONS 1-9: 0
SUM OF ALL RESPONSES TO PHQ QUESTIONS 1-9: 0

## 2021-05-11 NOTE — PROGRESS NOTES
Eliezer Mead is a 74 y.o. female who presents for evaluation of hypertension, hyperlipidemia, and diabetes. . She indicates that she is feeling well and denies any symptoms referable to her elevated blood pressure. Specifically denies chest pain, palpitations, dyspnea, orthopnea, PND or peripheral edema. No anorexia, arthralgia, or leg cramps noted. Current medication regimen is as listed below. She denies any side effects of medication, and has been taking it regularly. medication compliance:  compliant all of the time, diabetic diet compliance:  compliant most of the time, home glucose monitoring: are performed regularly, values range , further diabetic ROS: no polyuria or polydipsia, no chest pain, dyspnea or TIAs, no numbness, tingling or pain in extremities, last eye exam over 1 year ago. Depression: Patient complains of depression. She complains of anhedonia, depressed mood, insomnia and anxiety. Onset was approximately several years ago, controlled since that time. She denies current suicidal and homicidal plan or intent. Family history significant for no psychiatric illness. Possible organic causes contributing are: none. Risk factors: coronavirus pandemic Previous treatment includes Ativan ( rarely uses) , Zoloft and Remeron. She complains of the following side effects from the treatment: none. Hypothyroidism: Patient complains of hypothyroidism. Symptoms include none. Patient denies change in energy level, diarrhea, heat / cold intolerance, nervousness, palpitations and weight changes. Onset of symptoms was several years ago. Symptoms have been well-controlled. Patient with restrictive lung disease, currently being treated with albuterol nebulizer and sometimes inhaler. Patient with intermittent migraines. Patient controls them with Elavil. Rare migraines at this time. CHF: Denies any leg swelling or shortness of breath. Currently taking Lasix, potassium.     GERD: Patient  complains of heartburn. This has been associated with no other symptoms. She denies abdominal bloating and chest pain. Symptoms have been present for several years. She denies dysphagia. She has not lost weight. She denies melena, hematochezia, hematemesis, and coffee ground emesis. Medical therapy in the past has included proton pump inhibitors. Back Pain: Patient presents for presents evaluation of low back problems. Symptoms have been present for 2 weeks and include pain in right lower back (aching in character; 9/10 in severity). Initial inciting event: none. Alleviating factors identifiable by patient are chiropractic care. Exacerbating factors identifiable by patient are bending forwards, sitting and getting otu of bed. Treatments so far initiated by patient: chiropractic care. ROS: No TIA's or dysphagia. No prolonged cough. No dyspnea or chest pain on exertion. No abdominal pain, change in bowel habits, black or bloody stools. No urinary tract symptoms. She is post menopausal. No hot flashes, abnormal vaginal bleeding, discharge or unexpected pelvic pain. No new breast lumps, breast pain or nipple discharge. Past Medical History:   Diagnosis Date    A-fib ([facility] [address]     Anxiety     Cryptogenic organizing pneumonia ([facility] [address]     Depression     GERD (gastroesophageal reflux disease)     Hyperlipidemia     On home oxygen therapy     uses O2 3L prn    Rash     Thyroid disease     hypothyroidism     Past Surgical History:   Procedure Laterality Date    ARM SURGERY Left 3/2/2020    LEFT ULNAR NERVE DECOMPRESSION AT THE ELBOW performed by Lizabeth Mota MD at [facility] 6/27/2019    EBUS WF W/ANES.  performed by Sam Anguiano MD at [address] [place] [address]  6/27/2019    BRONCHOSCOPY/TRANSBRONCHIAL NEEDLE BIOPSY performed by Sam Anguiano MD at [address] [place] [address]  6/27/2019    BRONCHOSCOPY/TRANSBRONCHIAL LUNG BIOPSY performed by Alejo Velasquez 100 MG tablet Take 2 tablets by mouth daily 180 tablet 1    mirtazapine (REMERON) 30 MG tablet Take 1 tablet by mouth nightly 90 tablet 1    traZODone (DESYREL) 50 MG tablet TAKE 1 TABLET BY MOUTH EVERY DAY AT NIGHT 90 tablet 1    amitriptyline (ELAVIL) 25 MG tablet TAKE 1 TABLET BY MOUTH EVERY DAY AT NIGHT 90 tablet 1    furosemide (LASIX) 40 MG tablet TAKE 1 TABLET BY MOUTH EVERY DAY 90 tablet 1    nadolol (CORGARD) 20 MG tablet Take 1 tablet by mouth daily 90 tablet 1    potassium chloride (KLOR-CON M) 10 MEQ extended release tablet Take 2 tablets by mouth daily TAKE 1 TABLET BY MOUTH EVERY  tablet 1    benzonatate (TESSALON) 200 MG capsule TAKE 1 CAPSULE BY MOUTH 3 TIMES A DAY AS NEEDED FOR COUGH 90 capsule 5    diclofenac sodium (VOLTAREN) 1 % GEL Apply 2 g topically 4 times daily 2 Tube 1    albuterol sulfate HFA (PROVENTIL HFA) 108 (90 Base) MCG/ACT inhaler Inhale 2 puffs into the lungs every 6 hours as needed for Wheezing 1 Inhaler 2    INSULIN SYRINGE 1CC/29G 29G X 1/2\" 1 ML MISC 1 each by Does not apply route 2 times daily 100 each 5    FLUTICASONE FUROATE IN Inhale into the lungs as needed      Multiple Vitamins-Minerals (MULTIVITAMIN ADULT PO) Take by mouth daily      blood glucose test strips (ONETOUCH VERIO) strip 1 each by In Vitro route 2 times daily As needed. 100 each 5    blood glucose test strips (ONETOUCH VERIO) strip 1 each by In Vitro route daily As needed. 100 each 3    OXYGEN Inhale 3 L into the lungs       No current facility-administered medications for this visit. Allergies   Allergen Reactions    Penicillins Anaphylaxis     Tolerates Meropenem.  Cefuroxime      Tolerates Meropenem.  Doxycycline Hives    Imitrex [Sumatriptan] Other (See Comments)     Feels like pins and needles    Meperidine     Sulfa Antibiotics Hives    Keflex [Cephalexin] Itching and Rash     Tolerates Meropenem.     Tape Cosimo Monika Tape] Rash       Social History     Tobacco Use  Smoking status: Never Smoker    Smokeless tobacco: Never Used   Substance Use Topics    Alcohol use: No          Objective:      /80 (Site: Left Upper Arm, Position: Sitting, Cuff Size: Large Adult)   Pulse 115   Temp 96.3 °F (35.7 °C) (Infrared)   Wt 204 lb (92.5 kg)   SpO2 95%   BMI 36.14 kg/m²   General: Alert and oriented, in no distress   S1 and S2 normal, no murmurs, clicks, gallops or rubs. Regular rate and rhythm. Chest is clear; no wheezes or rales. No edema or JVD. heart sounds regular rate and rhythm, S1, S2 normal, no murmur, click, rub or gallop, chest clear, no hepatosplenomegaly, no carotid bruits, feet: normal DP and PT pulses, no trophic changes or ulcerative lesions and normal monofilament exam   A1c 5.8%  Assessment:      Essential hypertension - well controlled and stable  Diabetes--well controlled and stable   Hypothyroidism-asymptomatic  Anxiety -controlled  Depression-controlled  COPD-stable  Insomnia controlled  Migraines-controlled  Allergic rhinitis-controlled     Plan:       1)  Medication: continue current medication regimen unchanged  2)  Recheck in 6 months, sooner should new symptoms or problems arise. Justina Sumner received counseling on the following healthy behaviors: nutrition and exercise    Patient given educational materials on Diabetes    I have instructed Armin to complete a self tracking handout on Blood Sugars  and instructed them to bring it with them to her next appointment. Discussed use, benefit, and side effects of prescribed medications. Barriers to medication compliance addressed. All patient questions answered. Pt voiced understanding.

## 2021-05-11 NOTE — PATIENT INSTRUCTIONS
more stretch, put your other leg flat on the floor while pulling your knee to your chest.    Curl-ups   1. Lie on the floor on your back with your knees bent at a 90-degree angle. Your feet should be flat on the floor, about 12 inches from your buttocks. 2. Cross your arms over your chest. If this bothers your neck, try putting your hands behind your neck (not your head), with your elbows spread apart. 3. Slowly tighten your belly muscles and raise your shoulder blades off the floor. 4. Keep your head in line with your body, and do not press your chin to your chest.  5. Hold this position for 1 or 2 seconds, then slowly lower yourself back down to the floor. 6. Repeat 8 to 12 times. Pelvic tilt exercise   1. Lie on your back with your knees bent. 2. \"Brace\" your stomach. This means to tighten your muscles by pulling in and imagining your belly button moving toward your spine. You should feel like your back is pressing to the floor and your hips and pelvis are rocking back. 3. Hold for about 6 seconds while you breathe smoothly. 4. Repeat 8 to 12 times. Heel dig bridging   1. Lie on your back with both knees bent and your ankles bent so that only your heels are digging into the floor. Your knees should be bent about 90 degrees. 2. Then push your heels into the floor, squeeze your buttocks, and lift your hips off the floor until your shoulders, hips, and knees are all in a straight line. 3. Hold for about 6 seconds as you continue to breathe normally, and then slowly lower your hips back down to the floor and rest for up to 10 seconds. 4. Do 8 to 12 repetitions. Hamstring stretch in doorway   1. Lie on your back in a doorway, with one leg through the open door. 2. Slide your leg up the wall to straighten your knee. You should feel a gentle stretch down the back of your leg. 3. Hold the stretch for at least 15 to 30 seconds. Do not arch your back, point your toes, or bend either knee.  Keep one heel touching the floor and the other heel touching the wall. 4. Repeat with your other leg. 5. Do 2 to 4 times for each leg. Hip flexor stretch   1. Kneel on the floor with one knee bent and one leg behind you. Place your forward knee over your foot. Keep your other knee touching the floor. 2. Slowly push your hips forward until you feel a stretch in the upper thigh of your rear leg. 3. Hold the stretch for at least 15 to 30 seconds. Repeat with your other leg. 4. Do 2 to 4 times on each side. Wall sit   1. Stand with your back 10 to 12 inches away from a wall. 2. Lean into the wall until your back is flat against it. 3. Slowly slide down until your knees are slightly bent, pressing your lower back into the wall. 4. Hold for about 6 seconds, then slide back up the wall. 5. Repeat 8 to 12 times. Follow-up care is a key part of your treatment and safety. Be sure to make and go to all appointments, and call your doctor if you are having problems. It's also a good idea to know your test results and keep a list of the medicines you take. Where can you learn more? Go to https://MECLUBpeTidbitDotCo.[employer]Contact [employer]. org and sign in to your AdKeeper account. Enter H714 in the RocketBux box to learn more about \"Low Back Pain: Exercises. \"     If you do not have an account, please click on the \"Sign Up Now\" link. Current as of: November 16, 2020               Content Version: 12.8  © 2006-2021 Healthwise, Incorporated. Care instructions adapted under license by [facility] ([facility]). If you have questions about a medical condition or this instruction, always ask your healthcare professional. [place] [address] any warranty or liability for your use of this information.

## 2021-05-11 NOTE — PATIENT INSTRUCTIONS
Advance Directives: Care Instructions  Overview  An advance directive is a legal way to state your wishes at the end of your life. It tells your family and your doctor what to do if you can't say what you want. There are two main types of advance directives. You can change them any time your wishes change. Living will. This form tells your family and your doctor your wishes about life support and other treatment. The form is also called a declaration. Medical power of . This form lets you name a person to make treatment decisions for you when you can't speak for yourself. This person is called a health care agent (health care proxy, health care surrogate). The form is also called a durable power of  for health care. If you do not have an advance directive, decisions about your medical care may be made by a family member, or by a doctor or a  who doesn't know you. It may help to think of an advance directive as a gift to the people who care for you. If you have one, they won't have to make tough decisions by themselves. Follow-up care is a key part of your treatment and safety. Be sure to make and go to all appointments, and call your doctor if you are having problems. It's also a good idea to know your test results and keep a list of the medicines you take. What should you include in an advance directive? Many states have a unique advance directive form. (It may ask you to address specific issues.) Or you might use a universal form that's approved by many states. If your form doesn't tell you what to address, it may be hard to know what to include in your advance directive. Use the questions below to help you get started. · Who do you want to make decisions about your medical care if you are not able to? · What life-support measures do you want if you have a serious illness that gets worse over time or can't be cured? · What are you most afraid of that might happen? (Maybe you're afraid of having pain, losing your independence, or being kept alive by machines.)  · Where would you prefer to die? (Your home? A hospital? A nursing home?)  · Do you want to donate your organs when you die? · Do you want certain Latter-day practices performed before you die? When should you call for help? Be sure to contact your doctor if you have any questions. Where can you learn more? Go to https://Open Mepepiceweb.Agency Systems. org and sign in to your [employer] account. Enter R264 in the Websand box to learn more about \"Advance Directives: Care Instructions. \"     If you do not have an account, please click on the \"Sign Up Now\" link. Current as of: July 17, 2020               Content Version: 12.8  © 2006-2021 Healthwise, [employer]. Care instructions adapted under license by [facility] ([facility]). If you have questions about a medical condition or this instruction, always ask your healthcare professional. [address] any warranty or liability for your use of this information. Learning About Cutting Calories  How do calories affect your weight? Food gives your body energy. Energy from the food you eat is measured in calories. This energy keeps your heart beating, your brain active, and your muscles working. Your body needs a certain number of calories each day. After your body uses the calories it needs, it stores extra calories as fat. To lose weight safely, you have to eat fewer calories while eating in a healthy way. How many calories do you need each day? The more active you are, the more calories you need. When you are less active, you need fewer calories. How many calories you need each day also depends on several things, including your age and whether you are male or female. Here are some general guidelines for adults:  · Less active women and older adults need 1,600 to 2,000 calories each day.   · Active women and less active men need 2,000 to 2,400 calories each day. · Active men need 2,400 to 3,000 calories each day. How can you cut calories and eat healthy meals? Whole grains, vegetables and fruits, and dried beans are good lower-calorie foods. They give you lots of nutrients and fiber. And they fill you up. Sweets, energy drinks, and soda pop are high in calories. They give you few nutrients and no fiber. Try to limit soda pop, fruit juice, and energy drinks. Drink water instead. Some fats can be part of a healthy diet. But cutting back on fats from highly processed foods like fast foods and many snack foods is a good way to lower the calories in your diet. Also, use smaller amounts of fats like butter, margarine, salad dressing, and mayonnaise. Add fresh garlic, lemon, or herbs to your meals to add flavor without adding fat. Meats and dairy products can be a big source of hidden fats. Try to choose lean or low-fat versions of these products. Fat-free cookies, candies, chips, and frozen treats can still be high in sugar and calories. Some fat-free foods have more calories than regular ones. Eat fat-free treats in moderation, as you would other foods. If your favorite foods are high in fat, salt, sugar, or calories, limit how often you eat them. Eat smaller servings, or look for healthy substitutes. Fill up on fruits, vegetables, and whole grains. Eating at home  · Use meat as a side dish instead of as the main part of your meal.  · Try main dishes that use whole wheat pasta, brown rice, dried beans, or vegetables. · Find ways to cook with little or no fat, such as broiling, steaming, or grilling. · Use cooking spray instead of oil. If you use oil, use a monounsaturated oil, such as canola or olive oil. · Trim fat from meats before you cook them. · Drain off fat after you brown the meat or while you roast it. · Chill soups and stews after you cook them. Then skim the fat off the top after it hardens.   Eating out  · Order foods that are broiled or poached rather than fried or breaded. · Cut back on the amount of butter or margarine that you use on bread. · Order sauces, gravies, and salad dressings on the side, and use only a little. · When you order pasta, choose tomato sauce rather than cream sauce. · Ask for salsa with your baked potato instead of sour cream, butter, cheese, or lozano. · Order meals in a small size instead of upgrading to a large. · Share an entree, or take part of your food home to eat as another meal.  · Share appetizers and desserts. Where can you learn more? Go to https://O-film.F?rsat Bu F?rsat. org and sign in to your [employer] account. Enter P465 in the wiseri box to learn more about \"Learning About Cutting Calories. \"     If you do not have an account, please click on the \"Sign Up Now\" link. Current as of: December 17, 2020               Content Version: 12.8  © 9006-7143 Healthwise, [employer]. Care instructions adapted under license by [facility] ([facility]). If you have questions about a medical condition or this instruction, always ask your healthcare professional. [place] [address] any warranty or liability for your use of this information. Personalized Preventive Plan for Carlos Romo - 5/11/2021  Medicare offers a range of preventive health benefits. Some of the tests and screenings are paid in full while other may be subject to a deductible, co-insurance, and/or copay. Some of these benefits include a comprehensive review of your medical history including lifestyle, illnesses that may run in your family, and various assessments and screenings as appropriate. After reviewing your medical record and screening and assessments performed today your provider may have ordered immunizations, labs, imaging, and/or referrals for you.   A list of these orders (if applicable) as well as your Preventive Care list are included within your After Visit Summary for your review. Other Preventive Recommendations:    · A preventive eye exam performed by an eye specialist is recommended every 1-2 years to screen for glaucoma; cataracts, macular degeneration, and other eye disorders. · A preventive dental visit is recommended every 6 months. · Try to get at least 150 minutes of exercise per week or 10,000 steps per day on a pedometer . · Order or download the FREE \"Exercise & Physical Activity: Your Everyday Guide\" from [employer]. Call 8-304.962.9791 or search [employer] online. · You need 7050-3189 mg of calcium and 5734-7056 IU of vitamin D per day. It is possible to meet your calcium requirement with diet alone, but a vitamin D supplement is usually necessary to meet this goal.  · When exposed to the sun, use a sunscreen that protects against both UVA and UVB radiation with an SPF of 30 or greater. Reapply every 2 to 3 hours or after sweating, drying off with a towel, or swimming. · Always wear a seat belt when traveling in a car. Always wear a helmet when riding a bicycle or motorcycle.

## 2021-05-11 NOTE — PROGRESS NOTES
Medicare Annual Wellness Visit  Name: Talisha Oneill Date: 2021   MRN: [id number] Sex: Female   Age: 74 y.o. Ethnicity: Non-/Non    : 1955 Race: Idania Mcmahan is here for Medicare AWV    Screenings for behavioral, psychosocial and functional/safety risks, and cognitive dysfunction are all negative except as indicated below. These results, as well as other patient data from the [address] form, are documented in Flowsheets linked to this Encounter. Allergies   Allergen Reactions    Penicillins Anaphylaxis     Tolerates Meropenem.  Cefuroxime      Tolerates Meropenem.  Doxycycline Hives    Imitrex [Sumatriptan] Other (See Comments)     Feels like pins and needles    Meperidine     Sulfa Antibiotics Hives    Keflex [Cephalexin] Itching and Rash     Tolerates Meropenem.  Tape Aftab Shines Tape] Rash       Prior to Visit Medications    Medication Sig Taking?  Authorizing Provider   empagliflozin (JARDIANCE) 25 MG tablet Take 1 tablet by mouth daily  Patrick Gallo MD   SITagliptin (JANUVIA) 100 MG tablet Take 1 tablet by mouth daily  Patrick Gallo MD   pantoprazole (PROTONIX) 40 MG tablet Take 1 tablet by mouth every morning (before breakfast)  Patrick Gallo MD   sertraline (ZOLOFT) 100 MG tablet Take 2 tablets by mouth daily  Patrick Gallo MD   mirtazapine (REMERON) 30 MG tablet Take 1 tablet by mouth nightly  Patrick Gallo MD   furosemide (LASIX) 40 MG tablet TAKE 1 TABLET BY MOUTH EVERY DAY  Patrick Gallo MD   amitriptyline (ELAVIL) 25 MG tablet TAKE 1 TABLET BY MOUTH EVERY DAY AT NIGHT  Patrick Gallo MD   traZODone (DESYREL) 50 MG tablet TAKE 1 TABLET BY MOUTH EVERY DAY AT NIGHT  Patrick Gallo MD   nadolol (CORGARD) 20 MG tablet Take 1 tablet by mouth daily  Patrick Gallo MD   potassium chloride (KLOR-CON M) 10 MEQ extended release tablet Take 2 tablets by mouth daily TAKE 1 TABLET BY MOUTH EVERY DAY  Patrick Gallo MD   naproxen (NAPROSYN) 500 MG tablet Take 1 tablet by mouth 2 times daily (with meals)  Jc Zabala MD   baclofen (LIORESAL) 10 MG tablet Take 1 tablet by mouth 3 times daily  Jc Zabala MD   albuterol sulfate  (90 Base) MCG/ACT inhaler Inhale 2 puffs into the lungs every 6 hours as needed for Wheezing  Jc Zabala MD   levothyroxine (SYNTHROID) 125 MCG tablet Take 1 tablet by mouth Daily  Jc Zabala MD   hydrOXYzine (ATARAX) 10 MG tablet TAKE 1 TO 3 TABLETS BY MOUTH 3 TIMES DAILY AS NEEDED FOR ITCHING  Jc Zabala MD   albuterol (PROVENTIL) (2.5 MG/3ML) 0.083% nebulizer solution INHALE 3 MLS BY NEBULIZATION EVERY 6 HOURS AS NEEDED FOR WHEEZING  Jc Zabala MD   sodium chloride (OCEAN, BABY AYR) 0.65 % nasal spray 1 spray by Nasal route as needed for Congestion  Historical Provider, MD   benzonatate (TESSALON) 200 MG capsule TAKE 1 CAPSULE BY MOUTH 3 TIMES A DAY AS NEEDED FOR COUGH  Gamal Cool MD   diclofenac sodium (VOLTAREN) 1 % GEL Apply 2 g topically 4 times daily  Reba Holstein, MD   albuterol sulfate HFA (PROVENTIL HFA) 108 (90 Base) MCG/ACT inhaler Inhale 2 puffs into the lungs every 6 hours as needed for Wheezing  Jc Zabala MD   INSULIN SYRINGE 1CC/29G 29G X 1/2\" 1 ML MISC 1 each by Does not apply route 2 times daily  Jc Zabala MD   FLUTICASONE FUROATE IN Inhale into the lungs as needed  Historical Provider, MD   Multiple Vitamins-Minerals (MULTIVITAMIN ADULT PO) Take by mouth daily  Historical Provider, MD   blood glucose test strips (ONETOUCH VERIO) strip 1 each by In Vitro route 2 times daily As needed. Jc Zabala MD   blood glucose test strips [facility] VERIO) strip 1 each by In Vitro route daily As needed.   Jc Zabala MD   OXYGEN Inhale 3 L into the lungs  Historical Provider, MD       Past Medical History:   Diagnosis Date    A-fib [facility])     Anxiety     Cryptogenic organizing pneumonia ([address]     Depression     GERD (gastroesophageal reflux retinal exam  Never done    Shingles Vaccine (1 of 2) Never done    DEXA (modify frequency per FRAX score)  Never done    Annual Wellness Visit (AWV)  Never done    Pneumococcal 65+ yrs at Risk Vaccine (2 of 2 - PPSV23) 06/18/2020    Colon cancer screen colonoscopy  04/14/2021    Diabetic foot exam  11/23/2021    Diabetic microalbuminuria test  11/23/2021    Lipid screen  11/23/2021    Potassium monitoring  11/23/2021    Creatinine monitoring  11/23/2021    Breast cancer screen  01/16/2022    TSH testing  03/23/2022    A1C test (Diabetic or Prediabetic)  05/11/2022    DTaP/Tdap/Td vaccine (2 - Td) 02/04/2031    Flu vaccine  Completed    COVID-19 Vaccine  Completed    Hepatitis C screen  Completed    Hepatitis A vaccine  Aged Out    Hib vaccine  Aged Out    Meningococcal (ACWY) vaccine  Aged Out     Recommendations for IntenseDebate Due: see orders and patient instructions/AVS.  . Recommended screening schedule for the next 5-10 years is provided to the patient in written form: see Patient Instructions/AVS.    There are no diagnoses linked to this encounter. Advance Care Planning   Advanced Care Planning: Discussed the patients choices for care and treatment in case of a health event that adversely affects decision-making abilities. Also discussed the patients long-term treatment options. Reviewed with the patient the [address] [place] [address] Declaration forms  Reviewed the process of designating a competent adult as an Agent (or -in-fact) that could take make health care decisions for the patient if incompetent. Patient was asked to complete the declaration forms, either acknowledge the forms by a public notary or an eligible witness and provide a signed copy to the practice office. Time spent (minutes): 5    Obesity Counseling: Assessed behavioral health risks and factors affecting choice of behavior.  Suggested weight control approaches,

## 2021-05-12 LAB
ALBUMIN/GLOBULIN RATIO: 1.2 RATIO (ref 0.8–2.6)
ALBUMIN: 4 G/DL (ref 3.5–5.2)
ALP BLD-CCNC: 113 U/L (ref 23–144)
ALT SERPL-CCNC: 27 U/L (ref 0–60)
AST SERPL-CCNC: 76 U/L (ref 0–55)
BILIRUB SERPL-MCNC: 0.3 MG/DL (ref 0–1.2)
BUN BLDV-MCNC: 21 MG/DL (ref 3–29)
BUN/CREAT BLD: 23 (ref 7–25)
CALCIUM SERPL-MCNC: 9.4 MG/DL (ref 8.5–10.5)
CHLORIDE BLD-SCNC: 102 MEQ/L (ref 96–110)
CO2: 25 MEQ/L (ref 19–32)
CREAT SERPL-MCNC: 0.9 MG/DL (ref 0.5–1.2)
GFR AFRICAN AMERICAN: 76 MLS/MIN/1.73M2
GFR NON-AFRICAN AMERICAN: 66 MLS/MIN/1.73M2
GLOBULIN: 3.4 G/DL (ref 1.9–3.6)
GLUCOSE BLD-MCNC: 99 MG/DL (ref 70–99)
POTASSIUM SERPL-SCNC: 4.6 MEQ/L (ref 3.4–5.3)
SODIUM BLD-SCNC: 140 MEQ/L (ref 135–148)
STATUS: ABNORMAL
TOTAL PROTEIN: 7.4 G/DL (ref 6–8.3)

## 2021-05-26 ENCOUNTER — TELEPHONE (OUTPATIENT)
Dept: FAMILY MEDICINE CLINIC | Age: 66
End: 2021-05-26

## 2021-05-26 DIAGNOSIS — F41.9 ANXIETY: ICD-10-CM

## 2021-05-26 DIAGNOSIS — F51.04 PSYCHOPHYSIOLOGICAL INSOMNIA: ICD-10-CM

## 2021-05-26 DIAGNOSIS — F32.0 MILD SINGLE CURRENT EPISODE OF MAJOR DEPRESSIVE DISORDER (HCC): ICD-10-CM

## 2021-05-26 DIAGNOSIS — E11.9 TYPE 2 DIABETES MELLITUS WITHOUT COMPLICATION, WITHOUT LONG-TERM CURRENT USE OF INSULIN (HCC): ICD-10-CM

## 2021-05-26 DIAGNOSIS — M54.31 SCIATICA OF RIGHT SIDE: Primary | ICD-10-CM

## 2021-05-26 DIAGNOSIS — G43.009 MIGRAINE WITHOUT AURA AND WITHOUT STATUS MIGRAINOSUS, NOT INTRACTABLE: ICD-10-CM

## 2021-05-26 RX ORDER — PANTOPRAZOLE SODIUM 40 MG/1
40 TABLET, DELAYED RELEASE ORAL
Qty: 90 TABLET | Refills: 1 | Status: SHIPPED | OUTPATIENT
Start: 2021-05-26 | End: 2021-11-17 | Stop reason: SDUPTHER

## 2021-05-26 RX ORDER — TRAZODONE HYDROCHLORIDE 50 MG/1
TABLET ORAL
Qty: 90 TABLET | Refills: 1 | Status: ON HOLD | OUTPATIENT
Start: 2021-05-26 | End: 2021-06-20 | Stop reason: HOSPADM

## 2021-05-26 RX ORDER — FUROSEMIDE 40 MG/1
TABLET ORAL
Qty: 90 TABLET | Refills: 1 | Status: ON HOLD | OUTPATIENT
Start: 2021-05-26 | End: 2021-06-20 | Stop reason: HOSPADM

## 2021-05-26 RX ORDER — AMITRIPTYLINE HYDROCHLORIDE 25 MG/1
TABLET, FILM COATED ORAL
Qty: 90 TABLET | Refills: 1 | Status: SHIPPED | OUTPATIENT
Start: 2021-05-26 | End: 2021-11-17 | Stop reason: SDUPTHER

## 2021-05-26 RX ORDER — SERTRALINE HYDROCHLORIDE 100 MG/1
200 TABLET, FILM COATED ORAL DAILY
Qty: 180 TABLET | Refills: 1 | Status: SHIPPED | OUTPATIENT
Start: 2021-05-26 | End: 2021-11-17 | Stop reason: SDUPTHER

## 2021-05-26 RX ORDER — NADOLOL 20 MG/1
20 TABLET ORAL DAILY
Qty: 90 TABLET | Refills: 1 | Status: SHIPPED | OUTPATIENT
Start: 2021-05-26 | End: 2021-11-17 | Stop reason: SDUPTHER

## 2021-05-26 RX ORDER — POTASSIUM CHLORIDE 750 MG/1
20 TABLET, EXTENDED RELEASE ORAL DAILY
Qty: 180 TABLET | Refills: 1 | Status: ON HOLD | OUTPATIENT
Start: 2021-05-26 | End: 2021-06-20 | Stop reason: HOSPADM

## 2021-05-26 RX ORDER — MIRTAZAPINE 30 MG/1
30 TABLET, FILM COATED ORAL NIGHTLY
Qty: 90 TABLET | Refills: 1 | Status: SHIPPED | OUTPATIENT
Start: 2021-05-26 | End: 2021-11-17 | Stop reason: SDUPTHER

## 2021-05-26 NOTE — TELEPHONE ENCOUNTER
Consult sent per patient request.  Has patient had her TSH drawn yet? I know explicitly asked which pharmacy the medication is needed to go to at her appointment and it was [facility]. I will send the prescriptions to [facility] per patient request.  [facility] [place] and cancel all prescriptions are written except for the levothyroxine.

## 2021-05-26 NOTE — TELEPHONE ENCOUNTER
Patient daughter called state patient was told to call back if she was still having the sciatic nerve pain. Patient is wanting to do physical therapy. Would like the referral to go to [facility] (fax number is [phone])        Also all of patient medications went to [facility] but should of went to [facility] and wanted to know if you could resend to [facility].

## 2021-06-04 DIAGNOSIS — E03.9 ACQUIRED HYPOTHYROIDISM: ICD-10-CM

## 2021-06-04 LAB
T4 FREE: 0.59 NG/DL (ref 0.59–1.61)
THYROTROPIN RELEASING HORMONE: 6.21 UIU/ML (ref 0.34–4.82)

## 2021-06-04 RX ORDER — LEVOTHYROXINE SODIUM 0.15 MG/1
TABLET ORAL
Qty: 30 TABLET | Refills: 1 | Status: SHIPPED | OUTPATIENT
Start: 2021-06-04 | End: 2021-08-05 | Stop reason: SDUPTHER

## 2021-06-15 ENCOUNTER — TELEPHONE (OUTPATIENT)
Dept: FAMILY MEDICINE CLINIC | Age: 66
End: 2021-06-15

## 2021-06-15 NOTE — TELEPHONE ENCOUNTER
Spoke w patient states she was scheduled to see PT today but she had to cancel because she was in so much pain. States she re-scheduled for next week. States her pain is worse than childbirth, this is the worst pain she has ever experienced. Patient requesting pain medication.

## 2021-06-15 NOTE — TELEPHONE ENCOUNTER
Patient called requesting a referral to a specialist that can help her with her sciatic nerve pain. Patient also requesting if medication can be prescribed for her pain. Please advise.

## 2021-06-15 NOTE — TELEPHONE ENCOUNTER
If her pain is that severe, she should go to the ED. I do not call in pain meds.   I would have to see her in the office to evaluate her or treatment options

## 2021-06-16 ENCOUNTER — HOSPITAL ENCOUNTER (EMERGENCY)
Age: 66
Discharge: HOME | End: 2021-06-16
Attending: EMERGENCY MEDICINE
Payer: MEDICARE

## 2021-06-16 VITALS
DIASTOLIC BLOOD PRESSURE: 110 MMHG | TEMPERATURE: 98 F | BODY MASS INDEX: 35.44 KG/M2 | SYSTOLIC BLOOD PRESSURE: 126 MMHG | HEART RATE: 85 BPM | OXYGEN SATURATION: 92 % | WEIGHT: 200 LBS | HEIGHT: 63 IN | RESPIRATION RATE: 20 BRPM

## 2021-06-16 DIAGNOSIS — M54.31 SCIATICA OF RIGHT SIDE: Primary | ICD-10-CM

## 2021-06-16 PROCEDURE — 99284 EMERGENCY DEPT VISIT MOD MDM: CPT

## 2021-06-16 PROCEDURE — 6360000002 HC RX W HCPCS: Performed by: EMERGENCY MEDICINE

## 2021-06-16 PROCEDURE — 6370000000 HC RX 637 (ALT 250 FOR IP): Performed by: EMERGENCY MEDICINE

## 2021-06-16 PROCEDURE — 96372 THER/PROPH/DIAG INJ SC/IM: CPT

## 2021-06-16 RX ORDER — OXYCODONE HYDROCHLORIDE AND ACETAMINOPHEN 5; 325 MG/1; MG/1
1 TABLET ORAL ONCE
Status: COMPLETED | OUTPATIENT
Start: 2021-06-16 | End: 2021-06-16

## 2021-06-16 RX ORDER — KETOROLAC TROMETHAMINE 30 MG/ML
30 INJECTION, SOLUTION INTRAMUSCULAR; INTRAVENOUS ONCE
Status: COMPLETED | OUTPATIENT
Start: 2021-06-16 | End: 2021-06-16

## 2021-06-16 RX ORDER — KETOROLAC TROMETHAMINE 30 MG/ML
30 INJECTION, SOLUTION INTRAMUSCULAR; INTRAVENOUS ONCE
Status: DISCONTINUED | OUTPATIENT
Start: 2021-06-16 | End: 2021-06-16

## 2021-06-16 RX ADMIN — OXYCODONE HYDROCHLORIDE AND ACETAMINOPHEN 1 TABLET: 5; 325 TABLET ORAL at 19:45

## 2021-06-16 RX ADMIN — KETOROLAC TROMETHAMINE 30 MG: 30 INJECTION, SOLUTION INTRAMUSCULAR; INTRAVENOUS at 19:45

## 2021-06-16 ASSESSMENT — PAIN SCALES - GENERAL: PAINLEVEL_OUTOF10: 8

## 2021-06-16 ASSESSMENT — PAIN DESCRIPTION - LOCATION: LOCATION: LEG

## 2021-06-16 ASSESSMENT — PAIN DESCRIPTION - PAIN TYPE: TYPE: ACUTE PAIN;CHRONIC PAIN

## 2021-06-16 ASSESSMENT — PAIN DESCRIPTION - ORIENTATION: ORIENTATION: RIGHT

## 2021-06-16 NOTE — PROGRESS NOTES
Speech Language Pathology  Facility/Department: [facility]   CLINICAL BEDSIDE SWALLOW EVALUATION    NAME: Louisa Navarrete  : 1955  MRN: [id number]    ADMISSION DATE: 2019  ADMITTING DIAGNOSIS: has Chest pain; HTN (hypertension); Hyperlipidemia with target LDL less than 130; Depression; Hypokalemia; Insomnia; Trochanteric bursitis of both hips; Migraine; Syncope; Gastrointestinal hemorrhage associated with gastric ulcer; Acute blood loss anemia; Fatigue; Hypothyroidism; Mild single current episode of major depressive disorder ([address] Anxiety; Acute respiratory distress; Acute on chronic respiratory failure with hypoxia ([address] Bronchitis; Bronchospasm; Hypoxia; Multifocal pneumonia; Atrial fibrillation ([address] Bronchiectasis with acute exacerbation ([address] Pneumonia; Atypical pneumonia; Acute bronchitis with bronchospasm; Acute on chronic diastolic congestive heart failure ([address] Class 2 obesity with body mass index (BMI) of 39.0 to 39.9 in adult; Abnormal chest CT; Fever; Acute diastolic heart failure (HCC); and ILD (interstitial lung disease) ([address] on their problem list.  ONSET DATE:     Recent Chest Xray/CT of Chest: 2019  Impression   Relatively stable interstitial airspace opacities bilaterally which may   represent edema with underlying pneumonia not excluded.           Date of Eval: 2019  Evaluating Therapist: Luther Carrizales    Current Diet level:  Current Diet : Regular  Current Liquid Diet : Thin      Primary Complaint  Patient Complaint: The patient is a 76 y/o female who is admitted to [facility] with diagnosis of PNA. Per chart, the patient has been experiemcing worsening respiratory symptoms and has been seen on multiple occasions. Bronch was completed  which revealed pulmonary edema.  The patient is on regular solids and thin liquids at time of eval.    Pain:  Pain Assessment  Pain Assessment: 0-10  Pain Level: 5  Patient's Stated Pain Goal: No pain  Pain Type: Acute [de-identified] : Assessment: Right ankle Sprain/Injury.\par \par Plan:\par #1.  Over-the-counter brace as needed.\par #2. Antiinflammatories/Tylenol as needed.\par #3. RICE therapy\par #4. Limit activities and use of ankle for 2 weeks. \par #5. All questions answered.  Patient will follow-up in office in 6 to 8 weeks as needed.  If symptoms worsen or patient has concerns, they may call office and/or come into office sooner if needed.   pain  Pain Location: Head  Pain Orientation: Right, Left  Pain Descriptors: Headache  Pain Frequency: Continuous  Non-Pharmaceutical Pain Intervention(s): Cold applied(compress)  Response to Pain Intervention: Asleep with RR greater than 10  Multiple Pain Sites: No    Reason for Referral  Denise Roberto was referred for a bedside swallow evaluation to assess the efficiency of her swallow function, identify signs and symptoms of aspiration and make recommendations regarding safe dietary consistencies, effective compensatory strategies, and safe eating environment. Impression  Dysphagia Diagnosis: Mild pharyngeal stage dysphagia  Dysphagia Outcome Severity Scale: Level 6: Within functional limits/Modified independence     The patient was seen in room at bedside with RN permission. Daughter present. The patient is on 8L of O2 via NC with O2 sat at 95 and RR at 14 prior to PO trials. The patient was seated upright in chair and consuming lunch. OME performed with no abnormalities noted. The patient has adequate facial symmetry, lingual and labial ROM, mandibular ROM, and velar elevation was noted on phonation. Trials of regular solids, puree, and thin liquids were given. No overt clinical s/s of aspiration noted. The patient does exhibit mild reductio to hyolaryngeal elevation upon digital palpation to anterior neck, placing the patient at higher risk for PNA. SLP recommends regular solids and thin liquids, meds whole with water. Hold PO and contact SLP if s/s of aspiration develop. SLP to follow. Treatment Plan  Requires SLP Intervention: Yes  Duration/Frequency of Treatment: 1-2x/wk  D/C Recommendations: To be determined       Recommended Diet and Intervention  Diet Solids Recommendation: Regular  Liquid Consistency Recommendation:  Thin  Recommended Form of Meds: Whole with water  Recommendations: Dysphagia treatment  Therapeutic Interventions: Patient/Family education;Diet tolerance monitoring;Oral

## 2021-06-16 NOTE — ED PROVIDER NOTES
Emergency Department Attending Note    Lex Rowe MD    Date of ED VIsit: 6/16/2021    CHIEF COMPLAINT  Leg Pain (Three weeks found a sciatic nerve pinched, yesterday extreme pain started. Only pain relief is from prescribed muscle relaxers )      HISTORY OF PRESENT ILLNESS  José Manuel Crowley is a 74 y.o. female  With Vital signs of BP (!) 126/110   Pulse 85   Temp 98 °F (36.7 °C) (Oral)   Resp 20   Ht 5' 3\" (1.6 m)   Wt 200 lb (90.7 kg)   SpO2 92%   BMI 35.43 kg/m²  who presents to the ED with a complaint of sciatica. Patient seen and evaluated in room 5. Patient comes in complaining of right-sided sciatica which is a chronic problem for her. She is currently states that she is on a muscle relaxant and an anti-inflammatory neither of which are working [employer] prompted her to come into the emerge department to for evaluation. She can ambulate without any problems although she has pain with ambulation. She has no bowel or bladder symptoms no weakness in her legs except for weakness secondary to pain. No other complaints, modifying factors or associated symptoms. Patients Past medical history reviewed and listed below  Past Medical History:   Diagnosis Date    A-fib ([address]     Anxiety     Cryptogenic organizing pneumonia ([address]     Depression     GERD (gastroesophageal reflux disease)     Hyperlipidemia     On home oxygen therapy     uses O2 3L prn    Rash     Thyroid disease     hypothyroidism     Past Surgical History:   Procedure Laterality Date    ARM SURGERY Left 3/2/2020    LEFT ULNAR NERVE DECOMPRESSION AT THE ELBOW performed by Burt Fairchild MD at [facility] 6/27/2019    EBUS WF W/ANES.  performed by Jaylyn Mazariegos MD at [address] [place] [address]  6/27/2019    BRONCHOSCOPY/TRANSBRONCHIAL NEEDLE BIOPSY performed by Jaylyn Mazariegos MD at [address] [place] [address]  6/27/2019    BRONCHOSCOPY/TRANSBRONCHIAL LUNG BIOPSY performed by Wan Gilman MD [place] at [address]  6/27/2019    BRONCHOSCOPY ALVEOLAR LAVAGE performed by Olman Katz MD at [address]  07/10/2019    BRONCHOSCOPY N/A 7/10/2019    BRONCHOSCOPY ALVEOLAR LAVAGE performed by René Doyle MD at [address] Bilateral 08/06/2019    BRONCHOSCOPY N/A 8/6/2019    BRONCHOSCOPY ALVEOLAR LAVAGE performed by Hazel Funes MD at [address] N/A 12/24/2019    BRONCHOSCOPY ALVEOLAR LAVAGE performed by Nathalie Rosales MD at [address] [place] [address], TOTAL ABDOMINAL      partial       I have reviewed the following from the nursing documentation. Family History   Problem Relation Age of Onset    Diabetes Mother     High Blood Pressure Mother     Asthma Sister     Other Father      Social History     Socioeconomic History    Marital status:      Spouse name: Not on file    Number of children: 9    Years of education: Not on file    Highest education level: Not on file   Occupational History    Not on file   Tobacco Use    Smoking status: Never Smoker    Smokeless tobacco: Never Used   Vaping Use    Vaping Use: Never used   Substance and Sexual Activity    Alcohol use: No    Drug use: No    Sexual activity: Not Currently   Other Topics Concern    Not on file   Social History Narrative    Not on file     Social Determinants of Health     Financial Resource Strain:     Difficulty of Paying Living Expenses:    Food Insecurity:     Worried About Running Out of Food in the Last Year:     [address] in the Last Year:    Transportation Needs:     Lack of Transportation (Medical):      Lack of Transportation (Non-Medical):    Physical Activity:     Days of Exercise per Week:     Minutes of Exercise per Session:    Stress:     Feeling of Stress :    Social Connections:     Frequency of Communication with Friends and Family:     Frequency of Social Gatherings with Friends and Family:     Attends [employer] Services:     Active Member of Clubs or Organizations:     Attends Club or Organization Meetings:     Marital Status:    Intimate Partner Violence:     Fear of Current or Ex-Partner:     Emotionally Abused:     Physically Abused:     Sexually Abused:      Current Facility-Administered Medications   Medication Dose Route Frequency Provider Last Rate Last Admin    oxyCODONE-acetaminophen (PERCOCET) 5-325 MG per tablet 1 tablet  1 tablet Oral Once Delilah Dsouza MD        ketorolac (TORADOL) injection 30 mg  30 mg Intravenous Once Delilah Post, MD         Current Outpatient Medications   Medication Sig Dispense Refill    Loratadine-Pseudoephedrine (LORATADINE-D 12HR PO) Take 1 tablet by mouth daily      levothyroxine (SYNTHROID) 150 MCG tablet TAKE 1 TABLET BY MOUTH EVERY DAY 30 tablet 1    empagliflozin (JARDIANCE) 25 MG tablet Take 1 tablet by mouth daily 90 tablet 1    SITagliptin (JANUVIA) 100 MG tablet Take 1 tablet by mouth daily 90 tablet 1    pantoprazole (PROTONIX) 40 MG tablet Take 1 tablet by mouth every morning (before breakfast) 90 tablet 1    sertraline (ZOLOFT) 100 MG tablet Take 2 tablets by mouth daily 180 tablet 1    mirtazapine (REMERON) 30 MG tablet Take 1 tablet by mouth nightly 90 tablet 1    furosemide (LASIX) 40 MG tablet TAKE 1 TABLET BY MOUTH EVERY DAY 90 tablet 1    traZODone (DESYREL) 50 MG tablet TAKE 1 TABLET BY MOUTH EVERY DAY AT NIGHT 90 tablet 1    amitriptyline (ELAVIL) 25 MG tablet TAKE 1 TABLET BY MOUTH EVERY DAY AT NIGHT 90 tablet 1    nadolol (CORGARD) 20 MG tablet Take 1 tablet by mouth daily 90 tablet 1    potassium chloride (KLOR-CON M) 10 MEQ extended release tablet Take 2 tablets by mouth daily TAKE 1 TABLET BY MOUTH EVERY  tablet 1    naproxen (NAPROSYN) 500 MG tablet Take 1 tablet by mouth 2 times daily (with meals) 60 tablet 0    baclofen (LIORESAL) 10 MG tablet Take 1 tablet by mouth 3 times daily 60 tablet 0    hydrOXYzine (ATARAX) 10 MG tablet TAKE 1 TO 3 TABLETS BY MOUTH 3 TIMES DAILY AS NEEDED FOR ITCHING 90 tablet 1    albuterol (PROVENTIL) (2.5 MG/3ML) 0.083% nebulizer solution INHALE 3 MLS BY NEBULIZATION EVERY 6 HOURS AS NEEDED FOR WHEEZING 750 mL 1    sodium chloride (OCEAN, BABY AYR) 0.65 % nasal spray 1 spray by Nasal route as needed for Congestion      benzonatate (TESSALON) 200 MG capsule TAKE 1 CAPSULE BY MOUTH 3 TIMES A DAY AS NEEDED FOR COUGH 90 capsule 5    diclofenac sodium (VOLTAREN) 1 % GEL Apply 2 g topically 4 times daily 2 Tube 1    albuterol sulfate HFA (PROVENTIL HFA) 108 (90 Base) MCG/ACT inhaler Inhale 2 puffs into the lungs every 6 hours as needed for Wheezing 1 Inhaler 2    INSULIN SYRINGE 1CC/29G 29G X 1/2\" 1 ML MISC 1 each by Does not apply route 2 times daily 100 each 5    Multiple Vitamins-Minerals (MULTIVITAMIN ADULT PO) Take by mouth daily      blood glucose test strips (ONETOUCH VERIO) strip 1 each by In Vitro route 2 times daily As needed. 100 each 5    blood glucose test strips (ONETOUCH VERIO) strip 1 each by In Vitro route daily As needed. 100 each 3    OXYGEN Inhale 3 L into the lungs      FLUTICASONE FUROATE IN Inhale into the lungs as needed       Allergies   Allergen Reactions    Penicillins Anaphylaxis     Tolerates Meropenem.  Cefuroxime      Tolerates Meropenem.  Doxycycline Hives    Imitrex [Sumatriptan] Other (See Comments)     Feels like pins and needles    Meperidine     Sulfa Antibiotics Hives    Keflex [Cephalexin] Itching and Rash     Tolerates Meropenem.  Tape Giovanni Palm Tape] Rash       REVIEW OF SYSTEMS  10 systems reviewed, pertinent positives per HPI otherwise noted to be negative     PHYSICAL EXAM  BP (!) 126/110   Pulse 85   Temp 98 °F (36.7 °C) (Oral)   Resp 20   Ht 5' 3\" (1.6 m)   Wt 200 lb (90.7 kg)   SpO2 92%   BMI 35.43 kg/m²   GENERAL APPEARANCE: Awake and alert. Cooperative.  In no obvious distress while lying on the stretcher. I am able to elicit pain when I lift her leg  HEAD: Normocephalic. Atraumatic. EYES: PERRL. EOM's grossly intact. ENT: Mucous membranes are pink and moist.   NECK: Supple. HEART: RRR. No murmurs. LUNGS: Respirations unlabored. CTAB. Good air exchange. ABDOMEN: Soft. Non-distended. Non-tender. No masses. No organomegaly. No guarding or rebound. EXTREMITIES: No peripheral edema. Moves all extremities equally. All extremities neurovascularly intact. Patient has a positive straight leg raise on the right. Distally she is neurovascularly intact with good pulses  SKIN: Warm and dry. No acute rashes. NEUROLOGICAL: Alert and oriented. Strength 5/5, sensation intact. Gait normal.   PSYCHIATRIC: Normal mood and affect. No HI or SI expressed to me. RADIOLOGY    If acquired see below     EKG:     If acquired see below       ED COURSE/MDM    Patient will be treated with Percocet and with some Toradol here in the emergency department to see if we can break the cycle of pain I explained to the patient I could not prescribe her pain medications for chronic illness. So we will find out what works for her and get her feeling better for discharge. ED Course as of Jun 16 2021   Wed Jun 16, 2021 2021 Patient felt significantly better after the medication administered so she will be discharged advised to use the medication as she has to see if that works now that we broke the cycle of pain. If not I advised her to call her primary care doctor tomorrow for further management    [DL]      ED Course User Index  [DL] Linus Guthrie MD       The ED course and plan were reviewed and results discussed with the patient    The patient understood and agreed with the Discharge/transfer planning.     CLINICAL IMPRESSION and DISPOSITION    Blas Marroquin was stable and diagnosed with sciatica    Patient was treated with Percocet and Toradol        Linus Guthrie MD  06/16/21

## 2021-06-18 ENCOUNTER — APPOINTMENT (OUTPATIENT)
Dept: CT IMAGING | Age: 66
End: 2021-06-18
Payer: MEDICARE

## 2021-06-18 ENCOUNTER — APPOINTMENT (OUTPATIENT)
Dept: GENERAL RADIOLOGY | Age: 66
End: 2021-06-18
Payer: MEDICARE

## 2021-06-18 ENCOUNTER — HOSPITAL ENCOUNTER (OUTPATIENT)
Age: 66
Setting detail: OBSERVATION
Discharge: HOME | End: 2021-06-20
Attending: STUDENT IN AN ORGANIZED HEALTH CARE EDUCATION/TRAINING PROGRAM | Admitting: INTERNAL MEDICINE
Payer: MEDICARE

## 2021-06-18 DIAGNOSIS — M54.16 LUMBAR RADICULOPATHY: ICD-10-CM

## 2021-06-18 DIAGNOSIS — J18.9 PNEUMONIA OF RIGHT UPPER LOBE DUE TO INFECTIOUS ORGANISM: Primary | ICD-10-CM

## 2021-06-18 DIAGNOSIS — J96.11 CHRONIC RESPIRATORY FAILURE WITH HYPOXIA (HCC): ICD-10-CM

## 2021-06-18 DIAGNOSIS — R41.0 CONFUSION: ICD-10-CM

## 2021-06-18 DIAGNOSIS — N17.9 AKI (ACUTE KIDNEY INJURY) (HCC): ICD-10-CM

## 2021-06-18 PROBLEM — G92.8 TOXIC METABOLIC ENCEPHALOPATHY: Status: ACTIVE | Noted: 2021-06-18

## 2021-06-18 LAB
A/G RATIO: 1 (ref 1.1–2.2)
ALBUMIN SERPL-MCNC: 3.9 G/DL (ref 3.4–5)
ALP BLD-CCNC: 124 U/L (ref 40–129)
ALT SERPL-CCNC: 16 U/L (ref 10–40)
AMPHETAMINE SCREEN, URINE: NORMAL
ANION GAP SERPL CALCULATED.3IONS-SCNC: 11 MMOL/L (ref 3–16)
AST SERPL-CCNC: 34 U/L (ref 15–37)
BACTERIA: ABNORMAL /HPF
BARBITURATE SCREEN URINE: NORMAL
BASE EXCESS VENOUS: -0.9 MMOL/L (ref -3–3)
BASOPHILS ABSOLUTE: 0.1 K/UL (ref 0–0.2)
BASOPHILS RELATIVE PERCENT: 0.8 %
BENZODIAZEPINE SCREEN, URINE: NORMAL
BILIRUB SERPL-MCNC: 0.4 MG/DL (ref 0–1)
BILIRUBIN URINE: NEGATIVE
BLOOD, URINE: NEGATIVE
BUN BLDV-MCNC: 35 MG/DL (ref 7–20)
CALCIUM SERPL-MCNC: 9.1 MG/DL (ref 8.3–10.6)
CANNABINOID SCREEN URINE: NORMAL
CARBOXYHEMOGLOBIN: 1.4 % (ref 0–1.5)
CHLORIDE BLD-SCNC: 105 MMOL/L (ref 99–110)
CLARITY: CLEAR
CO2: 24 MMOL/L (ref 21–32)
COCAINE METABOLITE SCREEN URINE: NORMAL
COLOR: YELLOW
CREAT SERPL-MCNC: 1.4 MG/DL (ref 0.6–1.2)
EKG ATRIAL RATE: 78 BPM
EKG DIAGNOSIS: NORMAL
EKG P AXIS: 59 DEGREES
EKG P-R INTERVAL: 170 MS
EKG Q-T INTERVAL: 394 MS
EKG QRS DURATION: 78 MS
EKG QTC CALCULATION (BAZETT): 449 MS
EKG R AXIS: 34 DEGREES
EKG T AXIS: 33 DEGREES
EKG VENTRICULAR RATE: 78 BPM
EOSINOPHILS ABSOLUTE: 1 K/UL (ref 0–0.6)
EOSINOPHILS RELATIVE PERCENT: 9.8 %
EPITHELIAL CELLS, UA: ABNORMAL /HPF (ref 0–5)
GFR AFRICAN AMERICAN: 45
GFR NON-AFRICAN AMERICAN: 37
GLOBULIN: 4.1 G/DL
GLUCOSE BLD-MCNC: 126 MG/DL (ref 70–99)
GLUCOSE URINE: 250 MG/DL
HCO3 VENOUS: 25.8 MMOL/L (ref 23–29)
HCT VFR BLD CALC: 34.7 % (ref 36–48)
HEMOGLOBIN: 11 G/DL (ref 12–16)
HYALINE CASTS: ABNORMAL /LPF (ref 0–2)
INFLUENZA A: NOT DETECTED
INFLUENZA B: NOT DETECTED
KETONES, URINE: NEGATIVE MG/DL
LACTIC ACID: 1.2 MMOL/L (ref 0.4–2)
LEUKOCYTE ESTERASE, URINE: ABNORMAL
LYMPHOCYTES ABSOLUTE: 2.7 K/UL (ref 1–5.1)
LYMPHOCYTES RELATIVE PERCENT: 26.5 %
Lab: NORMAL
MCH RBC QN AUTO: 23.9 PG (ref 26–34)
MCHC RBC AUTO-ENTMCNC: 31.7 G/DL (ref 31–36)
MCV RBC AUTO: 75.2 FL (ref 80–100)
METHADONE SCREEN, URINE: NORMAL
METHEMOGLOBIN VENOUS: 0.3 %
MICROSCOPIC EXAMINATION: YES
MONOCYTES ABSOLUTE: 0.7 K/UL (ref 0–1.3)
MONOCYTES RELATIVE PERCENT: 7.1 %
NEUTROPHILS ABSOLUTE: 5.6 K/UL (ref 1.7–7.7)
NEUTROPHILS RELATIVE PERCENT: 55.8 %
NITRITE, URINE: NEGATIVE
O2 CONTENT, VEN: 13 VOL %
O2 SAT, VEN: 78 %
O2 THERAPY: ABNORMAL
OPIATE SCREEN URINE: NORMAL
OXYCODONE URINE: NORMAL
PCO2, VEN: 51.9 MMHG (ref 40–50)
PDW BLD-RTO: 19.7 % (ref 12.4–15.4)
PH UA: 5.5
PH UA: 5.5 (ref 5–8)
PH VENOUS: 7.32 (ref 7.35–7.45)
PHENCYCLIDINE SCREEN URINE: NORMAL
PLATELET # BLD: 210 K/UL (ref 135–450)
PMV BLD AUTO: 7.5 FL (ref 5–10.5)
PO2, VEN: 46.6 MMHG (ref 25–40)
POTASSIUM REFLEX MAGNESIUM: 4.5 MMOL/L (ref 3.5–5.1)
PROCALCITONIN: 0.35 NG/ML (ref 0–0.15)
PROPOXYPHENE SCREEN: NORMAL
PROTEIN UA: NEGATIVE MG/DL
RBC # BLD: 4.62 M/UL (ref 4–5.2)
RBC UA: ABNORMAL /HPF (ref 0–4)
SARS-COV-2 RNA, RT PCR: NOT DETECTED
SODIUM BLD-SCNC: 140 MMOL/L (ref 136–145)
SPECIFIC GRAVITY UA: 1.02 (ref 1–1.03)
TCO2 CALC VENOUS: 27 MMOL/L
TOTAL PROTEIN: 8 G/DL (ref 6.4–8.2)
TROPONIN: <0.01 NG/ML
URINE REFLEX TO CULTURE: ABNORMAL
URINE TYPE: ABNORMAL
UROBILINOGEN, URINE: 0.2 E.U./DL
WBC # BLD: 10.1 K/UL (ref 4–11)
WBC UA: ABNORMAL /HPF (ref 0–5)

## 2021-06-18 PROCEDURE — 82803 BLOOD GASES ANY COMBINATION: CPT

## 2021-06-18 PROCEDURE — 80053 COMPREHEN METABOLIC PANEL: CPT

## 2021-06-18 PROCEDURE — 94761 N-INVAS EAR/PLS OXIMETRY MLT: CPT

## 2021-06-18 PROCEDURE — 85025 COMPLETE CBC W/AUTO DIFF WBC: CPT

## 2021-06-18 PROCEDURE — 71045 X-RAY EXAM CHEST 1 VIEW: CPT

## 2021-06-18 PROCEDURE — 96367 TX/PROPH/DG ADDL SEQ IV INF: CPT

## 2021-06-18 PROCEDURE — 6370000000 HC RX 637 (ALT 250 FOR IP)

## 2021-06-18 PROCEDURE — 96366 THER/PROPH/DIAG IV INF ADDON: CPT

## 2021-06-18 PROCEDURE — 6360000002 HC RX W HCPCS: Performed by: PHYSICIAN ASSISTANT

## 2021-06-18 PROCEDURE — 96365 THER/PROPH/DIAG IV INF INIT: CPT

## 2021-06-18 PROCEDURE — 2580000003 HC RX 258: Performed by: PHYSICIAN ASSISTANT

## 2021-06-18 PROCEDURE — 96361 HYDRATE IV INFUSION ADD-ON: CPT

## 2021-06-18 PROCEDURE — 84484 ASSAY OF TROPONIN QUANT: CPT

## 2021-06-18 PROCEDURE — 71250 CT THORAX DX C-: CPT

## 2021-06-18 PROCEDURE — 99284 EMERGENCY DEPT VISIT MOD MDM: CPT

## 2021-06-18 PROCEDURE — 2700000000 HC OXYGEN THERAPY PER DAY

## 2021-06-18 PROCEDURE — G0378 HOSPITAL OBSERVATION PER HR: HCPCS

## 2021-06-18 PROCEDURE — 84145 PROCALCITONIN (PCT): CPT

## 2021-06-18 PROCEDURE — 80307 DRUG TEST PRSMV CHEM ANLYZR: CPT

## 2021-06-18 PROCEDURE — 70450 CT HEAD/BRAIN W/O DYE: CPT

## 2021-06-18 PROCEDURE — 87636 SARSCOV2 & INF A&B AMP PRB: CPT

## 2021-06-18 PROCEDURE — 93005 ELECTROCARDIOGRAM TRACING: CPT | Performed by: PHYSICIAN ASSISTANT

## 2021-06-18 PROCEDURE — 6370000000 HC RX 637 (ALT 250 FOR IP): Performed by: PHYSICIAN ASSISTANT

## 2021-06-18 PROCEDURE — 81001 URINALYSIS AUTO W/SCOPE: CPT

## 2021-06-18 PROCEDURE — 83605 ASSAY OF LACTIC ACID: CPT

## 2021-06-18 PROCEDURE — 93010 ELECTROCARDIOGRAM REPORT: CPT | Performed by: INTERNAL MEDICINE

## 2021-06-18 RX ORDER — ACETAMINOPHEN 325 MG/1
TABLET ORAL
Status: COMPLETED
Start: 2021-06-18 | End: 2021-06-18

## 2021-06-18 RX ORDER — SODIUM CHLORIDE 0.9 % (FLUSH) 0.9 %
5-40 SYRINGE (ML) INJECTION PRN
Status: DISCONTINUED | OUTPATIENT
Start: 2021-06-18 | End: 2021-06-20 | Stop reason: HOSPADM

## 2021-06-18 RX ORDER — ONDANSETRON 4 MG/1
4 TABLET, ORALLY DISINTEGRATING ORAL EVERY 8 HOURS PRN
Status: DISCONTINUED | OUTPATIENT
Start: 2021-06-18 | End: 2021-06-20 | Stop reason: HOSPADM

## 2021-06-18 RX ORDER — LEVOTHYROXINE SODIUM 0.15 MG/1
150 TABLET ORAL DAILY
Status: DISCONTINUED | OUTPATIENT
Start: 2021-06-19 | End: 2021-06-20 | Stop reason: HOSPADM

## 2021-06-18 RX ORDER — ACETAMINOPHEN 325 MG/1
650 TABLET ORAL EVERY 6 HOURS PRN
Status: DISCONTINUED | OUTPATIENT
Start: 2021-06-18 | End: 2021-06-20 | Stop reason: HOSPADM

## 2021-06-18 RX ORDER — ONDANSETRON 2 MG/ML
4 INJECTION INTRAMUSCULAR; INTRAVENOUS EVERY 6 HOURS PRN
Status: DISCONTINUED | OUTPATIENT
Start: 2021-06-18 | End: 2021-06-20 | Stop reason: HOSPADM

## 2021-06-18 RX ORDER — POLYETHYLENE GLYCOL 3350 17 G/17G
17 POWDER, FOR SOLUTION ORAL DAILY PRN
Status: DISCONTINUED | OUTPATIENT
Start: 2021-06-18 | End: 2021-06-20 | Stop reason: HOSPADM

## 2021-06-18 RX ORDER — AMITRIPTYLINE HYDROCHLORIDE 25 MG/1
25 TABLET, FILM COATED ORAL NIGHTLY PRN
Status: DISCONTINUED | OUTPATIENT
Start: 2021-06-18 | End: 2021-06-20 | Stop reason: HOSPADM

## 2021-06-18 RX ORDER — 0.9 % SODIUM CHLORIDE 0.9 %
1000 INTRAVENOUS SOLUTION INTRAVENOUS ONCE
Status: COMPLETED | OUTPATIENT
Start: 2021-06-18 | End: 2021-06-18

## 2021-06-18 RX ORDER — SODIUM CHLORIDE 9 MG/ML
INJECTION, SOLUTION INTRAVENOUS CONTINUOUS
Status: DISCONTINUED | OUTPATIENT
Start: 2021-06-18 | End: 2021-06-20 | Stop reason: HOSPADM

## 2021-06-18 RX ORDER — PANTOPRAZOLE SODIUM 40 MG/1
40 TABLET, DELAYED RELEASE ORAL
Status: DISCONTINUED | OUTPATIENT
Start: 2021-06-19 | End: 2021-06-20 | Stop reason: HOSPADM

## 2021-06-18 RX ORDER — NADOLOL 40 MG/1
20 TABLET ORAL DAILY
Status: DISCONTINUED | OUTPATIENT
Start: 2021-06-19 | End: 2021-06-20 | Stop reason: HOSPADM

## 2021-06-18 RX ORDER — DEXTROSE MONOHYDRATE 50 MG/ML
100 INJECTION, SOLUTION INTRAVENOUS PRN
Status: DISCONTINUED | OUTPATIENT
Start: 2021-06-18 | End: 2021-06-20 | Stop reason: HOSPADM

## 2021-06-18 RX ORDER — ACETAMINOPHEN 650 MG/1
650 SUPPOSITORY RECTAL EVERY 6 HOURS PRN
Status: DISCONTINUED | OUTPATIENT
Start: 2021-06-18 | End: 2021-06-20 | Stop reason: HOSPADM

## 2021-06-18 RX ORDER — SODIUM CHLORIDE 0.9 % (FLUSH) 0.9 %
5-40 SYRINGE (ML) INJECTION EVERY 12 HOURS SCHEDULED
Status: DISCONTINUED | OUTPATIENT
Start: 2021-06-19 | End: 2021-06-20 | Stop reason: HOSPADM

## 2021-06-18 RX ORDER — ACETAMINOPHEN 325 MG/1
650 TABLET ORAL ONCE
Status: COMPLETED | OUTPATIENT
Start: 2021-06-18 | End: 2021-06-18

## 2021-06-18 RX ORDER — SODIUM CHLORIDE 9 MG/ML
25 INJECTION, SOLUTION INTRAVENOUS PRN
Status: DISCONTINUED | OUTPATIENT
Start: 2021-06-18 | End: 2021-06-20 | Stop reason: HOSPADM

## 2021-06-18 RX ORDER — SERTRALINE HYDROCHLORIDE 100 MG/1
200 TABLET, FILM COATED ORAL DAILY
Status: DISCONTINUED | OUTPATIENT
Start: 2021-06-19 | End: 2021-06-20 | Stop reason: HOSPADM

## 2021-06-18 RX ORDER — NICOTINE POLACRILEX 4 MG
15 LOZENGE BUCCAL PRN
Status: DISCONTINUED | OUTPATIENT
Start: 2021-06-18 | End: 2021-06-20 | Stop reason: HOSPADM

## 2021-06-18 RX ORDER — DEXTROSE MONOHYDRATE 25 G/50ML
12.5 INJECTION, SOLUTION INTRAVENOUS PRN
Status: DISCONTINUED | OUTPATIENT
Start: 2021-06-18 | End: 2021-06-20 | Stop reason: HOSPADM

## 2021-06-18 RX ORDER — ACETAMINOPHEN 500 MG
1000 TABLET ORAL EVERY 6 HOURS PRN
Status: DISCONTINUED | OUTPATIENT
Start: 2021-06-18 | End: 2021-06-20 | Stop reason: HOSPADM

## 2021-06-18 RX ADMIN — MEROPENEM 1000 MG: 1 INJECTION INTRAVENOUS at 22:27

## 2021-06-18 RX ADMIN — ACETAMINOPHEN 650 MG: 325 TABLET ORAL at 16:45

## 2021-06-18 RX ADMIN — SODIUM CHLORIDE 1000 ML: 9 INJECTION, SOLUTION INTRAVENOUS at 16:47

## 2021-06-18 RX ADMIN — VANCOMYCIN HYDROCHLORIDE 1250 MG: 10 INJECTION, POWDER, LYOPHILIZED, FOR SOLUTION INTRAVENOUS at 19:55

## 2021-06-18 RX ADMIN — ACETAMINOPHEN 650 MG: 325 TABLET ORAL at 22:03

## 2021-06-18 ASSESSMENT — PAIN SCALES - GENERAL
PAINLEVEL_OUTOF10: 0
PAINLEVEL_OUTOF10: 10
PAINLEVEL_OUTOF10: 7

## 2021-06-18 NOTE — ED PROVIDER NOTES
[address] 48        Pt Name: Yahir Mitchell  MRN: [id number]  [place] 1955  Date of evaluation: 6/18/2021  Provider: Sherwin Dial PA-C  PCP: Augusto Cardozo MD    Shared Visit or Autonomous Visit:  I have seen and evaluated this patient with my supervising physician Ceasar Leija, [address] [place] [address]       Chief Complaint   Patient presents with    Altered Mental Status     Patient states that she began to have difficulty \"making connections in her brain\" since last night. HISTORY OF PRESENT ILLNESS   (Location/Symptom, Timing/Onset, Context/Setting, Quality, Duration, Modifying Factors, Severity)  Note limiting factors. Yahir Mitchell is a 74 y.o. female presenting to the emergency department for evaluation of confusion and right sided sciatica pain.  states she has seemed confused for the past week waxing and waning this morning was worst she slept in the chair last night due to her right-sided sciatica pain he awoke her at 8 AM this morning and she seemed out of it patient states did not know what was going on  states was having trouble connecting her ideas states she has been doing this the past week but this seemed a little worse and she was not answering him was staring off. She wears oxygen 2L nasal cannula all the time she has cryptogenic organizing pneumonia sees Dr. Boo Mediate pulmonology. States he left to get her portable oxygen tank returned about 11 AM and still seemed a little confused but has been improving not as bad as she was this morning. States she was wearing her oxygen all night and this morning. No chest pain. Chronic shortness of breath worse with exertion, no change. States has had wheezing and coughing up yellow phlegm. No fever. No headache. No vision changes or numbness or weakness. Complains of pain at her right hip sciatica.   States she was seen here a few days ago was given Hyperlipidemia     On home oxygen therapy     uses O2 3L prn    Rash     Thyroid disease     hypothyroidism         SURGICAL HISTORY     Past Surgical History:   Procedure Laterality Date    ARM SURGERY Left 3/2/2020    LEFT ULNAR NERVE DECOMPRESSION AT THE ELBOW performed by Gary Nava MD at [address] [facility] 6/27/2019    EBUS WF W/ANES.  performed by Ashtyn Myers MD at [address]  6/27/2019    BRONCHOSCOPY/TRANSBRONCHIAL NEEDLE BIOPSY performed by Ashtyn Myers MD at [address]  6/27/2019    BRONCHOSCOPY/TRANSBRONCHIAL LUNG BIOPSY performed by Ashtyn Myers MD at [address]  6/27/2019    BRONCHOSCOPY ALVEOLAR LAVAGE performed by Ashtyn Myers MD at [address]  07/10/2019    BRONCHOSCOPY N/A 7/10/2019    BRONCHOSCOPY ALVEOLAR LAVAGE performed by Sumit Carl MD at [address] Bilateral 08/06/2019    BRONCHOSCOPY N/A 8/6/2019    BRONCHOSCOPY ALVEOLAR LAVAGE performed by Palmer Terry MD at [address]  N/A 12/24/2019    BRONCHOSCOPY ALVEOLAR LAVAGE performed by Talita Meza MD at [address] , TOTAL ABDOMINAL      partial         [address]       Current Discharge Medication List      CONTINUE these medications which have NOT CHANGED    Details   Loratadine-Pseudoephedrine (LORATADINE-D 12HR PO) Take 1 tablet by mouth daily      levothyroxine (SYNTHROID) 150 MCG tablet TAKE 1 TABLET BY MOUTH EVERY DAY  Qty: 30 tablet, Refills: 1    Associated Diagnoses: Acquired hypothyroidism      empagliflozin (JARDIANCE) 25 MG tablet Take 1 tablet by mouth daily  Qty: 90 tablet, Refills: 1      SITagliptin (JANUVIA) 100 MG tablet Take 1 tablet by mouth daily  Qty: 90 tablet, Refills: 1    Associated Diagnoses: Type 2 diabetes mellitus without complication, without long-term current use of insulin needed for Congestion      benzonatate (TESSALON) 200 MG capsule TAKE 1 CAPSULE BY MOUTH 3 TIMES A DAY AS NEEDED FOR COUGH  Qty: 90 capsule, Refills: 5      diclofenac sodium (VOLTAREN) 1 % GEL Apply 2 g topically 4 times daily  Qty: 2 Tube, Refills: 1      albuterol sulfate HFA (PROVENTIL HFA) 108 (90 Base) MCG/ACT inhaler Inhale 2 puffs into the lungs every 6 hours as needed for Wheezing  Qty: 1 Inhaler, Refills: 2      INSULIN SYRINGE 1CC/29G 29G X 1/2\" 1 ML MISC 1 each by Does not apply route 2 times daily  Qty: 100 each, Refills: 5    Associated Diagnoses: Type 2 diabetes mellitus without complication, without long-term current use of insulin ([facility])      FLUTICASONE FUROATE IN Inhale into the lungs as needed      Multiple Vitamins-Minerals (MULTIVITAMIN ADULT PO) Take by mouth daily      !! blood glucose test strips (ONETOUCH VERIO) strip 1 each by In Vitro route 2 times daily As needed. Qty: 100 each, Refills: 5    Associated Diagnoses: Type 2 diabetes mellitus without complication, without long-term current use of insulin ([address]      ! ! blood glucose test strips (ONETOUCH VERIO) strip 1 each by In Vitro route daily As needed. Qty: 100 each, Refills: 3    Associated Diagnoses: Type 2 diabetes mellitus without complication, without long-term current use of insulin ([facility])      OXYGEN Inhale 3 L into the lungs       !! - Potential duplicate medications found. Please discuss with provider.             ALLERGIES     Penicillins, Cefuroxime, Doxycycline, Imitrex [sumatriptan], Meperidine, Sulfa antibiotics, Keflex [cephalexin], and Tape [adhesive tape]    FAMILYHISTORY       Family History   Problem Relation Age of Onset    Diabetes Mother     High Blood Pressure Mother     Asthma Sister     Other Father           SOCIAL HISTORY       Social History     Socioeconomic History    Marital status:      Spouse name: None    Number of children: 7    Years of education: None    Highest education level: None Occupational History    None   Tobacco Use    Smoking status: Never Smoker    Smokeless tobacco: Never Used   Vaping Use    Vaping Use: Never used   Substance and Sexual Activity    Alcohol use: No    Drug use: No    Sexual activity: Not Currently   Other Topics Concern    None   Social History Narrative    None     Social Determinants of Health     Financial Resource Strain:     Difficulty of Paying Living Expenses:    Food Insecurity:     Worried About Running Out of Food in the Last Year:     Ran Out of Food in the Last Year:    Transportation Needs:     Lack of Transportation (Medical):  Lack of Transportation (Non-Medical):    Physical Activity:     Days of Exercise per Week:     Minutes of Exercise per Session:    Stress:     Feeling of Stress :    Social Connections:     Frequency of Communication with Friends and Family:     Frequency of Social Gatherings with Friends and Family:     Attends [employer] Services:     Active Member of Clubs or Organizations:     Attends Club or Organization Meetings:     Marital Status:    Intimate Partner Violence:     Fear of Current or Ex-Partner:     Emotionally Abused:     Physically Abused:     Sexually Abused:        SCREENINGS             PHYSICAL EXAM    (up to 7 for level 4, 8 or more for level 5)     ED Triage Vitals [06/18/21 1347]   BP Temp Temp Source Pulse Resp SpO2 Height Weight   114/71 97.3 °F (36.3 °C) Axillary 74 16 99 % 5' 3\" (1.6 m) 200 lb (90.7 kg)       Physical Exam  Vitals and nursing note reviewed. Constitutional:       Appearance: She is well-developed. She is obese. She is ill-appearing. Interventions: Nasal cannula in place. HENT:      Head: Normocephalic and atraumatic. Mouth/Throat:      Mouth: Mucous membranes are dry. Pharynx: Oropharynx is clear. No pharyngeal swelling, oropharyngeal exudate or posterior oropharyngeal erythema.    Eyes:      Conjunctiva/sclera: Conjunctivae normal.      Pupils: Pupils are equal, round, and reactive to light. Neck:      Vascular: No JVD. Cardiovascular:      Rate and Rhythm: Normal rate and regular rhythm. Pulses: Normal pulses. Pulmonary:      Effort: Pulmonary effort is normal. No respiratory distress. Breath sounds: No stridor. Rhonchi present. No wheezing or rales. Abdominal:      General: Bowel sounds are normal. There is no distension. Palpations: Abdomen is soft. Abdomen is not rigid. There is no mass. Tenderness: There is no abdominal tenderness. There is no guarding or rebound. Musculoskeletal:         General: Normal range of motion. Cervical back: Normal range of motion and neck supple. Comments: Tenderness right SI. No bony step offs or crepitus. Intact sensation. Equal strength. Skin:     General: Skin is warm and dry. Findings: No rash. Neurological:      Mental Status: She is alert and oriented to person, place, and time. GCS: GCS eye subscore is 4. GCS verbal subscore is 5. GCS motor subscore is 6. Cranial Nerves: Cranial nerves are intact. No cranial nerve deficit or facial asymmetry. Sensory: Sensation is intact. No sensory deficit. Motor: Motor function is intact. No abnormal muscle tone or pronator drift. Coordination: Coordination normal. Finger-Nose-Finger Test normal.      Comments: Alert and oriented x3. Cranial facial musculature and sensation are intact. Pt has intact finger to nose. Intact sensation symmetric. Equal strength symmetric. Holds each extremity out extended for 10 seconds without drift. NIHSS 0.    Psychiatric:         Behavior: Behavior normal.         DIAGNOSTIC RESULTS   LABS:    Labs Reviewed   CBC WITH AUTO DIFFERENTIAL - Abnormal; Notable for the following components:       Result Value    Hemoglobin 11.0 (*)     Hematocrit 34.7 (*)     MCV 75.2 (*)     MCH 23.9 (*)     RDW 19.7 (*)     Eosinophils Absolute 1.0 (*)     All other components within normal limits    Narrative:     Performed at:  [facility],  [employer]   Phone [phone]   COMPREHENSIVE METABOLIC PANEL W/ REFLEX TO MG FOR LOW K - Abnormal; Notable for the following components:    Glucose 126 (*)     BUN 35 (*)     CREATININE 1.4 (*)     GFR Non- 37 (*)     GFR African American 45 (*)     Albumin/Globulin Ratio 1.0 (*)     All other components within normal limits    Narrative:     Performed at:  [place] [address],  [employer]   Phone [phone]   URINE RT REFLEX TO CULTURE - Abnormal; Notable for the following components:    Glucose, Ur 250 (*)     Leukocyte Esterase, Urine SMALL (*)     All other components within normal limits    Narrative:     Performed at:  [facility] [address],  [employer]   Phone [phone]   BLOOD GAS, VENOUS - Abnormal; Notable for the following components:    pH, Jasper 7.315 (*)     pCO2, Jasper 51.9 (*)     pO2, Jasper 46.6 (*)     All other components within normal limits    Narrative:     Performed at:  [facility],  [employer]   Phone [phone]   PROCALCITONIN - Abnormal; Notable for the following components:    Procalcitonin 0.35 (*)     All other components within normal limits    Narrative:     Performed at:  [place] [address],  [employer]   Phone [phone]   MICROSCOPIC URINALYSIS - Abnormal; Notable for the following components:    Hyaline Casts, UA 6-10 (*)     WBC, UA 6-9 (*)     Bacteria, UA Rare (*)     All other components within normal limits    Narrative:     Performed at:  [facility] [address],  [employer]   Phone [phone]   POCT GLUCOSE - Abnormal; Notable for the following components: POC Glucose 117 (*)     All other components within normal limits    Narrative:     Performed at:  [facility] [address],  [place], [facility]   Phone [phone]   COVID-19 & INFLUENZA COMBO    Narrative:     Performed at:  [facility] [address],  [place], [facility]   Phone [phone]   TROPONIN    Narrative:     Performed at:  [place] [address],  [place], [facility]   Phone [phone]   LACTIC ACID, PLASMA    Narrative:     Performed at:  [place] [address],  [place], [place]   Phone [phone]   URINE DRUG SCREEN    Narrative:     Performed at:  [facility] [address],  [place], [facility]   Phone [phone]   BASIC METABOLIC PANEL W/ REFLEX TO MG FOR LOW K   RENAL FUNCTION PANEL   CBC WITH AUTO DIFFERENTIAL   POCT GLUCOSE   POCT GLUCOSE     Results for orders placed or performed during the hospital encounter of 06/18/21   COVID-19 & Influenza Combo    Specimen: Nasopharyngeal Swab   Result Value Ref Range    SARS-CoV-2 RNA, RT PCR NOT DETECTED NOT DETECTED    INFLUENZA A NOT DETECTED NOT DETECTED    INFLUENZA B NOT DETECTED NOT DETECTED   CBC Auto Differential   Result Value Ref Range    WBC 10.1 4.0 - 11.0 K/uL    RBC 4.62 4.00 - 5.20 M/uL    Hemoglobin 11.0 (L) 12.0 - 16.0 g/dL    Hematocrit 34.7 (L) 36.0 - 48.0 %    MCV 75.2 (L) 80.0 - 100.0 fL    MCH 23.9 (L) 26.0 - 34.0 pg    MCHC 31.7 31.0 - 36.0 g/dL    RDW 19.7 (H) 12.4 - 15.4 %    Platelets 165 506 - 768 K/uL    MPV 7.5 5.0 - 10.5 fL    Neutrophils % 55.8 %    Lymphocytes % 26.5 %    Monocytes % 7.1 %    Eosinophils % 9.8 %    Basophils % 0.8 %    Neutrophils Absolute 5.6 1.7 - 7.7 K/uL    Lymphocytes Absolute 2.7 1.0 - 5.1 K/uL    Monocytes Absolute 0.7 0.0 - 1.3 K/uL    Eosinophils Absolute 1.0 (H) 0.0 - 0.6 K/uL Basophils Absolute 0.1 0.0 - 0.2 K/uL   Comprehensive Metabolic Panel w/ Reflex to MG   Result Value Ref Range    Sodium 140 136 - 145 mmol/L    Potassium reflex Magnesium 4.5 3.5 - 5.1 mmol/L    Chloride 105 99 - 110 mmol/L    CO2 24 21 - 32 mmol/L    Anion Gap 11 3 - 16    Glucose 126 (H) 70 - 99 mg/dL    BUN 35 (H) 7 - 20 mg/dL    CREATININE 1.4 (H) 0.6 - 1.2 mg/dL    GFR Non- 37 (A) >60    GFR  45 (A) >60    Calcium 9.1 8.3 - 10.6 mg/dL    Total Protein 8.0 6.4 - 8.2 g/dL    Albumin 3.9 3.4 - 5.0 g/dL    Albumin/Globulin Ratio 1.0 (L) 1.1 - 2.2    Total Bilirubin 0.4 0.0 - 1.0 mg/dL    Alkaline Phosphatase 124 40 - 129 U/L    ALT 16 10 - 40 U/L    AST 34 15 - 37 U/L    Globulin 4.1 g/dL   Troponin   Result Value Ref Range    Troponin <0.01 <0.01 ng/mL   Urinalysis Reflex to Culture    Specimen: Urine, clean catch   Result Value Ref Range    Color, UA Yellow Straw/Yellow    Clarity, UA Clear Clear    Glucose, Ur 250 (A) Negative mg/dL    Bilirubin Urine Negative Negative    Ketones, Urine Negative Negative mg/dL    Specific Gravity, UA 1.020 1.005 - 1.030    Blood, Urine Negative Negative    pH, UA 5.5 5.0 - 8.0    Protein, UA Negative Negative mg/dL    Urobilinogen, Urine 0.2 <2.0 E.U./dL    Nitrite, Urine Negative Negative    Leukocyte Esterase, Urine SMALL (A) Negative    Microscopic Examination YES     Urine Type see below     Urine Reflex to Culture Not Indicated    Blood gas, venous   Result Value Ref Range    pH, Jasper 7.315 (L) 7.350 - 7.450    pCO2, Jasper 51.9 (H) 40.0 - 50.0 mmHg    pO2, Jasper 46.6 (H) 25 - 40 mmHg    HCO3, Venous 25.8 23.0 - 29.0 mmol/L    Base Excess, Jasper -0.9 -3.0 - 3.0 mmol/L    O2 Sat, Jasper 78 Not Established %    Carboxyhemoglobin 1.4 0.0 - 1.5 %    MetHgb, Jasper 0.3 <1.5 %    TC02 (Calc), Jasper 27 Not Established mmol/L    O2 Content, Jasper 13 Not Established VOL %    O2 Therapy Unknown    Lactic Acid, Plasma   Result Value Ref Range    Lactic Acid 1.2 0.4 - 2.0 mmol/L   Procalcitonin   Result Value Ref Range    Procalcitonin 0.35 (H) 0.00 - 0.15 ng/mL   Drug screen multi urine   Result Value Ref Range    Amphetamine Screen, Urine Neg Negative <1000ng/mL    Barbiturate Screen, Ur Neg Negative <200 ng/mL    Benzodiazepine Screen, Urine Neg Negative <200 ng/mL    Cannabinoid Scrn, Ur Neg Negative <50 ng/mL    Cocaine Metabolite Screen, Urine Neg Negative <300 ng/mL    Opiate Scrn, Ur Neg Negative <300 ng/mL    PCP Screen, Urine Neg Negative <25 ng/mL    Methadone Screen, Urine Neg Negative <300 ng/mL    Propoxyphene Scrn, Ur Neg Negative <300 ng/mL    Oxycodone Urine Neg Negative <100 ng/ml    pH, UA 5.5     Drug Screen Comment: see below    Microscopic Urinalysis   Result Value Ref Range    Hyaline Casts, UA 6-10 (A) 0 - 2 /LPF    WBC, UA 6-9 (A) 0 - 5 /HPF    RBC, UA 3-4 0 - 4 /HPF    Epithelial Cells, UA 2-5 0 - 5 /HPF    Bacteria, UA Rare (A) None Seen /HPF   POCT Glucose   Result Value Ref Range    POC Glucose 117 (H) 70 - 99 mg/dl    Performed on ACCU-CHEK    EKG 12 Lead   Result Value Ref Range    Ventricular Rate 78 BPM    Atrial Rate 78 BPM    P-R Interval 170 ms    QRS Duration 78 ms    Q-T Interval 394 ms    QTc Calculation (Bazett) 449 ms    P Axis 59 degrees    R Axis 34 degrees    T Axis 33 degrees    Diagnosis       Normal sinus rhythmRSR' or QR pattern in V1 suggests right ventricular conduction delayNonspecific ST abnormalityNo previous ECGs availableConfirmed by FAHAD MOREIRA MD ([id number]) on 6/18/2021 5:51:19 PM       All other labs were within normal range or not returned as of this dictation. EKG: All EKG's are interpreted by the Emergency Department Physician in the absence of a cardiologist.  Please see their note for interpretation of EKG.       RADIOLOGY:   Non-plain film images such as CT, Ultrasound and MRI are read by the radiologist. Plain radiographic images are visualized andpreliminarily interpreted by the  ED Provider with the below findings:        Interpretation perthe Radiologist below, if available at the time of this note:    CT CHEST WO CONTRAST   Final Result   Stable fibrotic changes throughout the lungs with stable small mediastinal   nodes. Cirrhotic appearing liver         CT HEAD WO CONTRAST   Final Result   No acute intracranial abnormality. XR CHEST PORTABLE   Final Result   Increased right upper lobe opacity could represent progressive fibrotic   changes with volume loss or acute infection           CT HEAD WO CONTRAST    Result Date: 6/18/2021  EXAMINATION: CT OF THE HEAD WITHOUT CONTRAST  6/18/2021 3:06 pm TECHNIQUE: CT of the head was performed without the administration of intravenous contrast. Dose modulation, iterative reconstruction, and/or weight based adjustment of the mA/kV was utilized to reduce the radiation dose to as low as reasonably achievable. COMPARISON: None. HISTORY: ORDERING SYSTEM PROVIDED HISTORY: AMS TECHNOLOGIST PROVIDED HISTORY: Has a \"code stroke\" or \"stroke alert\" been called? ->No Reason for exam:->[facility] Decision Support Exception - unselect if not a suspected or confirmed emergency medical condition->Emergency Medical Condition (MA) Reason for Exam: Altered Mental Status (Patient states that she began to have difficulty \"making connections in her brain\" since last night. ) FINDINGS: BRAIN/VENTRICLES: There is no acute intracranial hemorrhage, mass effect or midline shift. No abnormal extra-axial fluid collection. The gray-white differentiation is maintained without evidence of an acute infarct. There is no evidence of hydrocephalus. Minimal periventricular white matter hypodensities consistent with the chronic small vessel ischemic disease. ORBITS: The visualized portion of the orbits demonstrate no acute abnormality. SINUSES: The visualized paranasal sinuses and mastoid air cells demonstrate no acute abnormality.  SOFT TISSUES/SKULL:  No acute abnormality of the visualized skull or soft 6/18/2021  EXAMINATION: ONE XRAY VIEW OF THE CHEST 6/18/2021 3:05 pm COMPARISON: 09/27/2020 HISTORY: ORDERING SYSTEM PROVIDED HISTORY: ams TECHNOLOGIST PROVIDED HISTORY: Reason for exam:->ams Reason for Exam: fatisarah ams FINDINGS: Cardiomediastinal silhouette stable. Right upper lobe opacity. No pneumothorax. No pleural effusion.      Increased right upper lobe opacity could represent progressive fibrotic changes with volume loss or acute infection         PROCEDURES   Unless otherwise noted below, none     Procedures    CRITICAL CARE TIME   N/A    CONSULTS:  IP CONSULT TO HOSPITALIST  IP CONSULT TO HOSPITALIST      EMERGENCY DEPARTMENT COURSE and DIFFERENTIAL DIAGNOSIS/MDM:   Vitals:    Vitals:    06/18/21 2232 06/18/21 2300 06/18/21 2345 06/19/21 0014   BP: (!) 100/55 112/64  101/62   Pulse: 69 85  87   Resp: 16 16     Temp:  97.3 °F (36.3 °C)  96.7 °F (35.9 °C)   TempSrc:  Oral  Axillary   SpO2: 100% 99% 97% 100%   Weight:  207 lb 14.4 oz (94.3 kg)     Height:  5' 3\" (1.6 m)         Patient was given thefollowing medications:  Medications   amitriptyline (ELAVIL) tablet 25 mg (25 mg Oral Given 6/19/21 0014)   levothyroxine (SYNTHROID) tablet 150 mcg (has no administration in time range)   nadolol (CORGARD) tablet 20 mg (has no administration in time range)   pantoprazole (PROTONIX) tablet 40 mg (has no administration in time range)   sertraline (ZOLOFT) tablet 200 mg (has no administration in time range)   glucose (GLUTOSE) 40 % oral gel 15 g (has no administration in time range)   dextrose 50 % IV solution (has no administration in time range)   glucagon (rDNA) injection 1 mg (has no administration in time range)   dextrose 5 % solution (has no administration in time range)   sodium chloride flush 0.9 % injection 5-40 mL (has no administration in time range)   sodium chloride flush 0.9 % injection 5-40 mL (has no administration in time range)   0.9 % sodium chloride infusion (has no administration in time range)   enoxaparin (LOVENOX) injection 40 mg (has no administration in time range)   ondansetron (ZOFRAN-ODT) disintegrating tablet 4 mg (has no administration in time range)     Or   ondansetron (ZOFRAN) injection 4 mg (has no administration in time range)   polyethylene glycol (GLYCOLAX) packet 17 g (has no administration in time range)   acetaminophen (TYLENOL) tablet 650 mg (650 mg Oral Given 6/18/21 2203)     Or   acetaminophen (TYLENOL) suppository 650 mg ( Rectal See Alternative 6/18/21 2203)   insulin lispro (HUMALOG) injection vial 0-12 Units (has no administration in time range)   insulin lispro (HUMALOG) injection vial 0-6 Units (0 Units Subcutaneous Not Given 6/19/21 0019)   acetaminophen (TYLENOL) tablet 1,000 mg (has no administration in time range)   0.9 % sodium chloride infusion ( Intravenous New Bag 6/19/21 0017)   acetaminophen (TYLENOL) tablet 650 mg (650 mg Oral Given 6/18/21 1645)   0.9 % sodium chloride bolus (0 mLs Intravenous Stopped 6/18/21 1907)   meropenem (MERREM) 1,000 mg in sterile water 20 mL IV syringe (0 mg Intravenous Stop Time 6/19/21 0019)   vancomycin (VANCOCIN) 1,250 mg in dextrose 5 % 250 mL IVPB (0 mg Intravenous Stopped 6/18/21 2200)         Patient has had confusion the past week worse this morning. History of chronic respiratory failure, on nasal cannula oxygen all the time, having increased cough and states she has been wheezing. Chest x-ray showing Increased right upper lobe opacity could represent progressive fibrotic changes with volume loss or acute infection. Covered with antibiotics. Serum pH 7.315 pCO2 51.9. alert and oriented x3. CT brain no acute intracranial normality. On labs white count 10.1. Hemoglobin 11.0.  Glucose 126. BUN 35, creatinine 1.4, new VIRGINIE was given IV fluids. Procalcitonin 0.35. With suspected pneumonia, confusion, VIRGINIE chronic respiratory disease recommend admission for further evaluation and treatment. Patient in agreement.   Patient

## 2021-06-18 NOTE — ED PROVIDER NOTES
I independently examined and evaluated Vamsi Rocha. All diagnostic, treatment, and disposition decisions were made by myself in conjunction with the advanced practice provider. For all further details of the patient's emergency department visit, please see the advanced practice provider's documentation. Primary Care Physician: Zoe Mosley MD    History: This is a 74 y.o. female who presents to the Emergency Department with complaint of history of confusion over the last 1 week intermittently seems to be centered around pain woke him from sleep, can last several hours at times. She is fatigued appearing here times falling asleep but is arousable to minimal stimulus and to voice. NIH is 0. She is alert oriented and understands appropriate context of her presentation. Speech does not sound garbled. However she states that she does feel like she has to think harder about that the worst that she says. No headache no vision change no neck pain or stiffness no chest pain shortness of breath abdominal pain. Does complain of right-sided sciatica this is unchanged does not have any weakness or drift on that side. Patient was started on baclofen for sciatic pain roughly 2 weeks ago prior to onset of these worsening confusion, may be overdoing medication dosages. She is a very tired appearing in room falling asleep during the eval.    Physical:     height is 5' 3\" (1.6 m) and weight is 200 lb (90.7 kg). Her axillary temperature is 97.3 °F (36.3 °C). Her blood pressure is 114/71 and her pulse is 74. Her respiration is 16 and oxygen saturation is 99%. 74 y.o. female   Somnolent but easily arousable  Heart regular rhythm  Lungs clear  Abdomen soft nontender  Neuro:INITIAL NIH STROKE SCALE        1a.  Level of consciousness:  0 - alert; keenly responsive  1b. Level of consciousness questions:  0 - answers both questions correctly  1c.   Level of consciousness questions:  0 - performs both tasks

## 2021-06-18 NOTE — ED PROVIDER NOTES
The Ekg interpreted by me shows  normal sinus rhythm with a rate of 78  Axis is   Normal  QTc is  normal  Intervals and Durations are unremarkable.     Incomplete right bundle branch block    ST Segments: nonspecific changes  No significant change from prior EKG dated 9/27/2020           Lewis Dumont MD  06/18/21 1872

## 2021-06-19 LAB
ALBUMIN SERPL-MCNC: 3.5 G/DL (ref 3.4–5)
AMMONIA: 29 UMOL/L (ref 11–51)
ANION GAP SERPL CALCULATED.3IONS-SCNC: 11 MMOL/L (ref 3–16)
BASOPHILS ABSOLUTE: 0.1 K/UL (ref 0–0.2)
BASOPHILS RELATIVE PERCENT: 1.6 %
BUN BLDV-MCNC: 24 MG/DL (ref 7–20)
CALCIUM SERPL-MCNC: 8.6 MG/DL (ref 8.3–10.6)
CHLORIDE BLD-SCNC: 106 MMOL/L (ref 99–110)
CO2: 23 MMOL/L (ref 21–32)
CREAT SERPL-MCNC: 1 MG/DL (ref 0.6–1.2)
EOSINOPHILS ABSOLUTE: 1 K/UL (ref 0–0.6)
EOSINOPHILS RELATIVE PERCENT: 10.7 %
GFR AFRICAN AMERICAN: >60
GFR NON-AFRICAN AMERICAN: 55
GLUCOSE BLD-MCNC: 102 MG/DL (ref 70–99)
GLUCOSE BLD-MCNC: 104 MG/DL (ref 70–99)
GLUCOSE BLD-MCNC: 105 MG/DL (ref 70–99)
GLUCOSE BLD-MCNC: 112 MG/DL (ref 70–99)
GLUCOSE BLD-MCNC: 112 MG/DL (ref 70–99)
GLUCOSE BLD-MCNC: 117 MG/DL (ref 70–99)
HCT VFR BLD CALC: 32.4 % (ref 36–48)
HEMOGLOBIN: 10.2 G/DL (ref 12–16)
LYMPHOCYTES ABSOLUTE: 2 K/UL (ref 1–5.1)
LYMPHOCYTES RELATIVE PERCENT: 22.9 %
MCH RBC QN AUTO: 23.7 PG (ref 26–34)
MCHC RBC AUTO-ENTMCNC: 31.4 G/DL (ref 31–36)
MCV RBC AUTO: 75.5 FL (ref 80–100)
MONOCYTES ABSOLUTE: 0.5 K/UL (ref 0–1.3)
MONOCYTES RELATIVE PERCENT: 5.8 %
NEUTROPHILS ABSOLUTE: 5.3 K/UL (ref 1.7–7.7)
NEUTROPHILS RELATIVE PERCENT: 59 %
PDW BLD-RTO: 19.5 % (ref 12.4–15.4)
PERFORMED ON: ABNORMAL
PHOSPHORUS: 3.9 MG/DL (ref 2.5–4.9)
PLATELET # BLD: 193 K/UL (ref 135–450)
PMV BLD AUTO: 7.6 FL (ref 5–10.5)
POTASSIUM REFLEX MAGNESIUM: 4.1 MMOL/L (ref 3.5–5.1)
POTASSIUM SERPL-SCNC: 4.1 MMOL/L (ref 3.5–5.1)
RBC # BLD: 4.3 M/UL (ref 4–5.2)
SODIUM BLD-SCNC: 140 MMOL/L (ref 136–145)
WBC # BLD: 8.9 K/UL (ref 4–11)

## 2021-06-19 PROCEDURE — 2700000000 HC OXYGEN THERAPY PER DAY

## 2021-06-19 PROCEDURE — 97116 GAIT TRAINING THERAPY: CPT

## 2021-06-19 PROCEDURE — 6370000000 HC RX 637 (ALT 250 FOR IP): Performed by: INTERNAL MEDICINE

## 2021-06-19 PROCEDURE — 97161 PT EVAL LOW COMPLEX 20 MIN: CPT

## 2021-06-19 PROCEDURE — 82140 ASSAY OF AMMONIA: CPT

## 2021-06-19 PROCEDURE — 97530 THERAPEUTIC ACTIVITIES: CPT

## 2021-06-19 PROCEDURE — 94761 N-INVAS EAR/PLS OXIMETRY MLT: CPT

## 2021-06-19 PROCEDURE — 2580000003 HC RX 258: Performed by: INTERNAL MEDICINE

## 2021-06-19 PROCEDURE — 97166 OT EVAL MOD COMPLEX 45 MIN: CPT

## 2021-06-19 PROCEDURE — 97535 SELF CARE MNGMENT TRAINING: CPT

## 2021-06-19 PROCEDURE — 85025 COMPLETE CBC W/AUTO DIFF WBC: CPT

## 2021-06-19 PROCEDURE — 80069 RENAL FUNCTION PANEL: CPT

## 2021-06-19 PROCEDURE — 36415 COLL VENOUS BLD VENIPUNCTURE: CPT

## 2021-06-19 PROCEDURE — 96372 THER/PROPH/DIAG INJ SC/IM: CPT

## 2021-06-19 PROCEDURE — 6360000002 HC RX W HCPCS: Performed by: INTERNAL MEDICINE

## 2021-06-19 PROCEDURE — 99226 PR SBSQ OBSERVATION CARE/DAY 35 MINUTES: CPT | Performed by: INTERNAL MEDICINE

## 2021-06-19 PROCEDURE — G0378 HOSPITAL OBSERVATION PER HR: HCPCS

## 2021-06-19 RX ORDER — OXYCODONE HYDROCHLORIDE AND ACETAMINOPHEN 5; 325 MG/1; MG/1
1 TABLET ORAL EVERY 4 HOURS PRN
Status: DISCONTINUED | OUTPATIENT
Start: 2021-06-19 | End: 2021-06-20 | Stop reason: HOSPADM

## 2021-06-19 RX ORDER — LIDOCAINE 4 G/G
1 PATCH TOPICAL DAILY
Status: DISCONTINUED | OUTPATIENT
Start: 2021-06-19 | End: 2021-06-20 | Stop reason: HOSPADM

## 2021-06-19 RX ADMIN — ACETAMINOPHEN 650 MG: 325 TABLET ORAL at 04:54

## 2021-06-19 RX ADMIN — Medication 10 ML: at 20:03

## 2021-06-19 RX ADMIN — LEVOTHYROXINE SODIUM 150 MCG: 0.15 TABLET ORAL at 11:04

## 2021-06-19 RX ADMIN — Medication 10 ML: at 11:09

## 2021-06-19 RX ADMIN — PANTOPRAZOLE SODIUM 40 MG: 40 TABLET, DELAYED RELEASE ORAL at 06:40

## 2021-06-19 RX ADMIN — ACETAMINOPHEN 1000 MG: 500 TABLET ORAL at 11:08

## 2021-06-19 RX ADMIN — OXYCODONE HYDROCHLORIDE AND ACETAMINOPHEN 1 TABLET: 5; 325 TABLET ORAL at 15:50

## 2021-06-19 RX ADMIN — SODIUM CHLORIDE: 9 INJECTION, SOLUTION INTRAVENOUS at 00:17

## 2021-06-19 RX ADMIN — OXYCODONE HYDROCHLORIDE AND ACETAMINOPHEN 1 TABLET: 5; 325 TABLET ORAL at 20:03

## 2021-06-19 RX ADMIN — SERTRALINE 200 MG: 100 TABLET, FILM COATED ORAL at 11:04

## 2021-06-19 RX ADMIN — SODIUM CHLORIDE: 9 INJECTION, SOLUTION INTRAVENOUS at 12:20

## 2021-06-19 RX ADMIN — NADOLOL 20 MG: 40 TABLET ORAL at 11:05

## 2021-06-19 RX ADMIN — AMITRIPTYLINE HYDROCHLORIDE 25 MG: 25 TABLET, FILM COATED ORAL at 22:30

## 2021-06-19 RX ADMIN — ENOXAPARIN SODIUM 40 MG: 40 INJECTION, SOLUTION INTRAVENOUS; SUBCUTANEOUS at 11:05

## 2021-06-19 RX ADMIN — AMITRIPTYLINE HYDROCHLORIDE 25 MG: 25 TABLET, FILM COATED ORAL at 00:14

## 2021-06-19 ASSESSMENT — PAIN SCALES - GENERAL
PAINLEVEL_OUTOF10: 10
PAINLEVEL_OUTOF10: 4
PAINLEVEL_OUTOF10: 7
PAINLEVEL_OUTOF10: 4
PAINLEVEL_OUTOF10: 6
PAINLEVEL_OUTOF10: 8
PAINLEVEL_OUTOF10: 9
PAINLEVEL_OUTOF10: 6
PAINLEVEL_OUTOF10: 6

## 2021-06-19 ASSESSMENT — ENCOUNTER SYMPTOMS
ABDOMINAL PAIN: 0
COUGH: 1
VOMITING: 0
BACK PAIN: 1
BOWEL INCONTINENCE: 0
SHORTNESS OF BREATH: 1
VISUAL CHANGE: 0

## 2021-06-19 ASSESSMENT — PAIN DESCRIPTION - ORIENTATION
ORIENTATION: RIGHT
ORIENTATION: RIGHT;LOWER

## 2021-06-19 ASSESSMENT — PAIN DESCRIPTION - PAIN TYPE
TYPE: CHRONIC PAIN
TYPE: CHRONIC PAIN

## 2021-06-19 ASSESSMENT — PAIN DESCRIPTION - LOCATION
LOCATION: BACK
LOCATION: BACK

## 2021-06-19 ASSESSMENT — PAIN DESCRIPTION - FREQUENCY: FREQUENCY: INTERMITTENT

## 2021-06-19 ASSESSMENT — PAIN DESCRIPTION - DIRECTION: RADIATING_TOWARDS: BUTTOCK

## 2021-06-19 ASSESSMENT — PAIN DESCRIPTION - DESCRIPTORS: DESCRIPTORS: ACHING;SHARP

## 2021-06-19 NOTE — PROGRESS NOTES
the last 72 hours. PT/INR: No results for input(s): PROTIME, INR in the last 72 hours. APTT: No results for input(s): APTT in the last 72 hours. CARDIAC ENZYMES:   Recent Labs     06/18/21  1536   TROPONINI <0.01     FASTING LIPID PANEL:  Lab Results   Component Value Date    CHOL 196 11/23/2020    HDL 52 (L) 11/23/2020    TRIG 98 11/23/2020     LIVER PROFILE:   Recent Labs     06/18/21  1536   AST 34   ALT 16   BILITOT 0.4   ALKPHOS 124        Radiology  CT CHEST WO CONTRAST   Final Result   Stable fibrotic changes throughout the lungs with stable small mediastinal   nodes. Cirrhotic appearing liver         CT HEAD WO CONTRAST   Final Result   No acute intracranial abnormality. XR CHEST PORTABLE   Final Result   Increased right upper lobe opacity could represent progressive fibrotic   changes with volume loss or acute infection               Assessment:  Active Problems:    Toxic metabolic encephalopathy  Resolved Problems:    * No resolved hospital problems. *      Plan:  #Toxic metabolic encephalopathy  -Secondary to medications and VIRGINIE  -Baclofen and naproxen discontinued.  -She presented with increasing somnolence and mentation changes , this is resolved now . mental status clear  -Ammonia level    #VIRGINIE  -Secondary to NSAID use  -Continue IV hydration  -Stop naproxen  -BUN and creatinine improved from 35 and 1.4 with 24 and 1.0 today    #Low back pain with radiculopathy  Sciatica  -Order topical lidocaine patch  -Mental status clearing up, will order oxycodone    #Diabetes mellitus type 2  -SSI    #Obesity    #Cryptogenic organizing pneumonia  Chronic hypoxic respiratory failure on O2 2 L  -CT chest reviewed. No active pneumonia, she has chronic fibrotic changes       ADULT DIET;  Regular; 3 carb choices (45 gm/meal)   Full Code  - Lovenox for DVT prophylaxis      Jana Arriaza MD, MD 6/19/2021 12:34 PM

## 2021-06-19 NOTE — PROGRESS NOTES
Pt a/o. Am assessment completed see flow sheet. Pt denies any pain/ needs at this time. Call light within reach. Will continue to monitor. Bedside Mobility Assessment Tool (BMAT):     Assessment Level 1- Sit and Shake    1. From a semi-reclined position, ask patient to sit up and rotate to a seated position at the side of the bed. Can use the bedrail. 2. Ask patient to reach out and grab your hand and shake making sure patient reaches across his/her midline. Pass- Patient is able to come to a seated position, maintain core strength. Maintains seated balance while reaching across midline. Move on to Assessment Level 2. Assessment Level 2- Stretch and Point   1. With patient in seated position at the side of the bed, have patient place both feet on the floor (or stool) with knees no higher than hips. 2. Ask patient to stretch one leg and straighten the knee, then bend the ankle/flex and point the toes. If appropriate, repeat with the other leg. Pass- Patient is able to demonstrate appropriate quad strength on intended weight bearing limb(s). Move onto Assessment Level 3. Assessment Level 3- Stand   1. Ask patient to elevate off the bed or chair (seated to standing) using an assistive device (cane, bedrail). 2. Patient should be able to raise buttocks off be and hold for a count of five. May repeat once. Pass- Patient maintains standing stability for at least 5 seconds, proceed to assessment level 4. Assessment Level 4- Walk   1. Ask patient to march in place at bedside. 2. Then ask patient to advance step and return each foot. Some medical conditions may render a patient from stepping backwards, use your best clinical judgement. Pass- Patient demonstrates balance while shifting weight and ability to step, takes independent steps, does not use assistive device patient is MOBILITY LEVEL 4.       Mobility Level- 4     Patient is not able to demonstrated the ability to move from a

## 2021-06-19 NOTE — PROGRESS NOTES
Bedside report given and pt care transferred to [facility]. Pt denies needs at this time. Call light within reach.

## 2021-06-19 NOTE — PROGRESS NOTES
Inpatient Occupational Therapy  Evaluation and Treatment    Unit: [address]  Date:  6/19/2021  Patient Name:    Vamsi Rocha  Admitting diagnosis:  Toxic metabolic encephalopathy [Y89]  Admit Date:  6/18/2021  Precautions/Restrictions/WB Status/ Lines/ Wounds/ Oxygen: fall risk, IV, bed/chair alarm and supplemental O2 (2L)    Treatment Time:  1634-7480  Treatment Number: 1     Billable Treatment Time: 55 minutes   Total Treatment Time:   65   minutes    Patient Goals for Therapy:  \" improve balance \"      Discharge Recommendations: SNF (Pt reports she will be able to get 24/7 assist with daughter staying while  works, Pt will benefit from therapy to increase strength)  DME needs for discharge: Needs Met       Therapy recommendations for staff:   Assist of 1 with use of rolling walker (RW) for all ambulation within room    History of Present Illness: Per H&P Dr. Liane Whittaker 6/18/21: 74 y.o. female who presented to the hospital with a chief complaint of worsening altered mental status. When I saw the patient she was difficult to arouse but arousable. Most of the history was obtained from her  who was at the bedside. The patient apparently has had right sciatic pain over the last couple of weeks. The pain has been intolerable and debilitating. She saw her physician who prescribed her baclofen and Naprosyn. She has been taking these medications routinely and today only had 1 out of 60 tabs of baclofen left from the last month. The  also mentioned to the patient has been having increased confusion and mental status changes over the last couple of days. She does have a history of cryptogenic organizing pneumonia and follows with pulmonary as an outpatient. She is chronically on oxygen therapy. In the emergency department work-up was pretty much unremarkable except for an VIRGINIE and clinical symptoms of dehydration. She will be admitted for observation and medical evaluation.     Home Health S4 Level Recommendation:  Level 3 Safety  AM-PAC Score: AM-PAC Inpatient Daily Activity Raw Score: 16    Preadmission Environment    Per OT eval 19:  Pt. Paula Richard spouse & 1 dogs and a cat (Pt works during day 4pm-1am)  Home environment:   1 story home   Steps to enter first floor: 3 Steps to enter and One Hand rail  Steps to second floor: N/A  Bathroom: Walk in [address], Grab bars, Shower Chair  and comfort height toilet without grab bars   Equipment owned: rollator, Shower Chair, lift chair, home O2 (2 L) continuous, pulse ox and nebulizer      Preadmission Status:  Pt. Able to drive: No  and daughter drive Pt  Pt Fully independent with ADLs: Yes  Pt. Required assistance from family for:Pt and spouse share cooking, laundry, cleaning. pt's spouse completes the yard work   Pt. Fully independent for transfers and gait and walked with rollator  History of falls Yes, recently holding onto couch and chair. Pt stays in chair mostly when spouse is at work in the evenings. Daughter works for everyday homecare, but may be able to provide assist at home. Pain  Yes  Ratin  Location:buttock, and right leg  Pain Medicine Status: No request made  , Pt reports receiving pain meds in early AM    Cognition    A&O x4   Able to follow 2 step commands    Subjective  Patient lying supine in bed with no family present  Pt agreeable to this OT eval & tx. Upper Extremity ROM:    WFL,  pt able to perform all bed mobility, transfers, and gait without ROM limitation.     Upper Extremity Strength:    BUE strength impaired but not formally assessed w/ MMT    Upper Extremity Sensation    WFL    Upper Extremity Proprioception:  WFL    Coordination and Tone  WFL    Balance  Functional Sitting Balance:  Impaired  SBA at EOB  Functional Standing Balance:Impaired  Min A with RW  Bed mobility:    Supine to sit:   SBA  Sit to supine:   Min A  Rolling:    SBA  Scooting in sitting:  SBA  Scooting to head of bed:   Not Tested    Bridging:   Not Tested    Transfers:    Sit to stand:  CGA  Stand to sit:  CGA  Bed to chair:   Min A/CGA with RW, fatigued 2nd transfer needing increased assist  Standard toilet: Min A with RW  Bed to [facility]:  Not Tested    Dressing:      UE:   SBA  LE:    Min A    Bathing:    UE:  SBA  LE:  Min A    Eating:   Not Tested    Toileting:  CGA    Activity Tolerance   Pt completed therapy session with SOB noted with functional mobility  SpO2: varied in session 95%-88% on 2L O2 (88% after functional mobility)  HR: 93  BP:     Positioning Needs: In bed, call light and needs in reach. Alarm Set    Exercise / Activities Initiated:   N/A    Patient/Family Education:   Role of OT  Recommendations for DC  Energy conservation techniques  Safe RW use/hand placement    Assessment of Deficits: Pt seen for Occupational therapy evaluation in acute care setting. Pt demonstrated decreased Activity tolerance, ADLs, IADLs, Balance , Bed mobility, Safety Awareness, Strength and Transfers. Pt functioning below baseline and will likely benefit from skilled occupational therapy services to maximize safety and independence. Goal(s) : To be met in 3 Visits:  1). Bed to toilet/BSC: CGA    To be met in 5 Visits:  1). Supine to/from Sit:  Supervision  2). Upper Body Bathing:   Supervision  3). Lower Body Bathing:   SBA  4). Upper Body Dressing:  Supervision  5). Lower Body Dressing:  SBA  6). Pt to demonstrate UE exs x 15 reps with minimal cues    Rehabilitation Potential:  Good for goals listed above. Strengths for achieving goals include: Pt motivated, PLOF and Family Support  Barriers to achieving goals include:  Pain and Weakness     Plan: To be seen 3-5 x/wk while in acute care setting for therapeutic exercises, bed mobility, transfers, dressing, bathing, family/patient education, ADL/IADL retraining, energy conservation training.      Ca Chao, OTR/L 7413        If patient discharges from this facility prior to next visit, this note will serve as the Discharge Summary

## 2021-06-19 NOTE — H&P
Hospital Medicine History & Physical      PCP: Rosa Lewis MD    Date of Admission: 6/18/2021    Date of Service: Pt seen/examined on 6/18/2021    Chief Complaint: Altered mental status    History Of Present Illness:    74 y.o. female who presented to the hospital with a chief complaint of worsening altered mental status. When I saw the patient she was difficult to arouse but arousable. Most of the history was obtained from her  who was at the bedside. The patient apparently has had right sciatic pain over the last couple of weeks. The pain has been intolerable and debilitating. She saw her physician who prescribed her baclofen and Naprosyn. She has been taking these medications routinely and today only had 1 out of 60 tabs of baclofen left from the last month. The  also mentioned to the patient has been having increased confusion and mental status changes over the last couple of days. She does have a history of cryptogenic organizing pneumonia and follows with pulmonary as an outpatient. She is chronically on oxygen therapy. In the emergency department work-up was pretty much unremarkable except for an VIRGINIE and clinical symptoms of dehydration. She will be admitted for observation and medical evaluation. Past Medical History:        Diagnosis Date    A-fib ([address]     Anxiety     Cryptogenic organizing pneumonia ([address]     Depression     GERD (gastroesophageal reflux disease)     Hyperlipidemia     On home oxygen therapy     uses O2 3L prn    Rash     Thyroid disease     hypothyroidism       Past Surgical History:        Procedure Laterality Date    ARM SURGERY Left 3/2/2020    LEFT ULNAR NERVE DECOMPRESSION AT THE ELBOW performed by Rodriguez Saucedo MD at [address] 6/27/2019    EBUS WF W/ANES.  performed by Leigh Rogers MD at [address]  6/27/2019    BRONCHOSCOPY/TRANSBRONCHIAL NEEDLE BIOPSY performed by Leigh Rogers MD at [address]  6/27/2019    BRONCHOSCOPY/TRANSBRONCHIAL LUNG BIOPSY performed by Michelet Traore MD at [address]  6/27/2019    BRONCHOSCOPY ALVEOLAR LAVAGE performed by Michelet Traore MD at [address]  07/10/2019    BRONCHOSCOPY N/A 7/10/2019    BRONCHOSCOPY ALVEOLAR LAVAGE performed by Kunal Garcia MD at [address] Bilateral 08/06/2019    BRONCHOSCOPY N/A 8/6/2019    BRONCHOSCOPY ALVEOLAR LAVAGE performed by Anita Boxer, MD at [address] N/A 12/24/2019    BRONCHOSCOPY ALVEOLAR LAVAGE performed by Atiya Best MD at [address] [place] [address], TOTAL ABDOMINAL      partial       Medications Prior to Admission:   Prior to Admission medications    Medication Sig Start Date End Date Taking?  Authorizing Provider   Loratadine-Pseudoephedrine (LORATADINE-D 12HR PO) Take 1 tablet by mouth daily   Yes Historical Provider, MD   levothyroxine (SYNTHROID) 150 MCG tablet TAKE 1 TABLET BY MOUTH EVERY DAY 6/4/21  Yes Heydi Wagner MD   empagliflozin (JARDIANCE) 25 MG tablet Take 1 tablet by mouth daily 5/26/21  Yes Heydi Wagner MD   SITagliptin (JANUVIA) 100 MG tablet Take 1 tablet by mouth daily 5/26/21  Yes Heydi Wagner MD   pantoprazole (PROTONIX) 40 MG tablet Take 1 tablet by mouth every morning (before breakfast) 5/26/21  Yes Heydi Wagner MD   sertraline (ZOLOFT) 100 MG tablet Take 2 tablets by mouth daily 5/26/21  Yes Heydi Wagner MD   mirtazapine (REMERON) 30 MG tablet Take 1 tablet by mouth nightly 5/26/21  Yes Heydi Wagner MD   furosemide (LASIX) 40 MG tablet TAKE 1 TABLET BY MOUTH EVERY DAY 5/26/21  Yes Heydi Wagner MD   traZODone (DESYREL) 50 MG tablet TAKE 1 TABLET BY MOUTH EVERY DAY AT NIGHT 5/26/21  Yes Heydi Wagner MD   amitriptyline (ELAVIL) 25 MG tablet TAKE 1 TABLET BY MOUTH EVERY DAY AT NIGHT 5/26/21  Yes Heydi Wagner MD nadolol (CORGARD) 20 MG tablet Take 1 tablet by mouth daily 5/26/21  Yes Jatinder Hartman MD   potassium chloride (KLOR-CON M) 10 MEQ extended release tablet Take 2 tablets by mouth daily TAKE 1 TABLET BY MOUTH EVERY DAY 5/26/21  Yes Jatinder Hartman MD   naproxen (NAPROSYN) 500 MG tablet Take 1 tablet by mouth 2 times daily (with meals) 5/11/21  Yes Jatinder Hartman MD   baclofen (LIORESAL) 10 MG tablet Take 1 tablet by mouth 3 times daily 5/11/21  Yes Jatinder Hartman MD   hydrOXYzine (ATARAX) 10 MG tablet TAKE 1 TO 3 TABLETS BY MOUTH 3 TIMES DAILY AS NEEDED FOR ITCHING 3/23/21  Yes Jatinder Hartman MD   albuterol (PROVENTIL) (2.5 MG/3ML) 0.083% nebulizer solution INHALE 3 MLS BY NEBULIZATION EVERY 6 HOURS AS NEEDED FOR WHEEZING 2/17/21  Yes Jatinder Hartman MD   sodium chloride (OCEAN, BABY AYR) 0.65 % nasal spray 1 spray by Nasal route as needed for Congestion   Yes Historical Provider, MD   benzonatate (TESSALON) 200 MG capsule TAKE 1 CAPSULE BY MOUTH 3 TIMES A DAY AS NEEDED FOR COUGH 10/28/20  Yes Juan Luis Isbell MD   diclofenac sodium (VOLTAREN) 1 % GEL Apply 2 g topically 4 times daily 7/2/20  Yes Christopher Haque MD   albuterol sulfate HFA (PROVENTIL HFA) 108 (90 Base) MCG/ACT inhaler Inhale 2 puffs into the lungs every 6 hours as needed for Wheezing 6/1/20  Yes Jatinder Hartman MD   INSULIN SYRINGE 1CC/29G 29G X 1/2\" 1 ML MISC 1 each by Does not apply route 2 times daily 3/6/20  Yes Jatinder Hartman MD   FLUTICASONE FUROATE IN Inhale into the lungs as needed   Yes Historical Provider, MD   Multiple Vitamins-Minerals (MULTIVITAMIN ADULT PO) Take by mouth daily   Yes Historical Provider, MD   blood glucose test strips (ONETOUCH VERIO) strip 1 each by In Vitro route 2 times daily As needed. 11/15/19  Yes Jatinder Hartman MD   blood glucose test strips [facility] VERIO) strip 1 each by In Vitro route daily As needed.  10/15/19  Yes Jatinder Hartman MD   OXYGEN Inhale 3 L into the lungs 7/14/19  Yes Historical Provider, MD       Allergies:  Penicillins, Cefuroxime, Doxycycline, Imitrex [sumatriptan], Meperidine, Sulfa antibiotics, Keflex [cephalexin], and Tape [adhesive tape]    Social History:    TOBACCO:   reports that she has never smoked. She has never used smokeless tobacco.  ETOH:   reports no history of alcohol use. Family History:        Problem Relation Age of Onset    Diabetes Mother     High Blood Pressure Mother     Asthma Sister     Other Father        REVIEW OF SYSTEMS:   The patient was not alert enough to obtain a review of systems    PHYSICAL EXAM:    /88   Pulse 87   Temp 97.3 °F (36.3 °C) (Axillary)   Resp 16   Ht 5' 3\" (1.6 m)   Wt 200 lb (90.7 kg)   SpO2 100%   BMI 35.43 kg/m²     General appearance: Groggy, does not appear to be in distress  HEENT: NCAT  Neck: Supple, with full range of motion. No jugular venous distention. Trachea midline. Respiratory:  Normal respiratory effort. Clear to auscultation, bilaterally without Rales/Wheezes/Rhonchi. Cardiovascular:  Regular rate and rhythm with normal S1/S2 without murmurs, rubs or gallops. Abdomen: Soft, non-tender, non-distended with normal bowel sounds. Musculoskeletal:  No clubbing, cyanosis or edema bilaterally. Full range of motion without deformity. Skin: Skin color, texture, turgor normal.  No rashes or lesions. Neurologic: No focal deficits, able to follow commands and move appropriately. Psychiatric:  Alert and oriented, thought content appropriate, normal insight  Capillary Refill: Brisk,< 3 seconds   Peripheral Pulses: +2 palpable, equal bilaterally       Labs:   Recent Labs     06/18/21  1536   WBC 10.1   HGB 11.0*   HCT 34.7*        Recent Labs     06/18/21  1536      K 4.5      CO2 24   BUN 35*   CREATININE 1.4*   CALCIUM 9.1     Recent Labs     06/18/21  1536   AST 34   ALT 16   BILITOT 0.4   ALKPHOS 124     No results for input(s): INR in the last 72 hours.   Recent Labs     06/18/21  1536 TROPONINI <0.01       Urinalysis:      Lab Results   Component Value Date    NITRU Negative 11/07/2019    WBCUA 20-50 07/10/2019    BACTERIA 1+ 07/10/2019    RBCUA 0-2 07/10/2019    BLOODU Negative 11/07/2019    SPECGRAV 1.010 11/07/2019    GLUCOSEU Negative 11/07/2019       Radiology:     CT HEAD WO CONTRAST   Final Result   No acute intracranial abnormality. XR CHEST PORTABLE   Final Result   Increased right upper lobe opacity could represent progressive fibrotic   changes with volume loss or acute infection         CT CHEST WO CONTRAST    (Results Pending)       ASSESSMENT:  Toxic metabolic encephalopathy  VIRGINIE  Diabetes mellitus type 2  Morbid obesity  Right sciatic pain  Cryptogenic organizing pneumonia  Chronic respiratory failure on 2 L of oxygen chronically    PLAN:  Patient was treated in the emergency department with empiric antibiotics for questionable pneumonia. I repeated his CT scan he does not look like the patient has an active pneumonia. What was seen was chronic fibrotic changes on her CT from her cryptogenic organizing pneumonia. The patient likely has increased somnolence and mentation changes secondary to baclofen overuse for her sciatic pain. We will stop all sedating medications and reevaluate her in the a.m.    -Hold all nephrotoxic medications, challenge with IV fluids  -Sliding scale insulin therapy  -PT OT, evaluate for rehab. DVT Prophylaxis: Lovenox  Diet: No diet orders on file  Code Status: Prior    Dispo -admit to [facility]      Thank you for the opportunity to be involved in this patient's care.       (Please note that portions of this note were completed with a voice recognition program. Efforts were made to edit the dictations but occasionally words are mis-transcribed.)

## 2021-06-19 NOTE — PLAN OF CARE
Problem: Falls - Risk of:  Goal: Will remain free from falls  Description: Will remain free from falls  6/19/2021 1152 by Geraldine Acosta RN  Outcome: Ongoing  6/19/2021 0420 by Luli Corcoran RN  Outcome: Ongoing  Goal: Absence of physical injury  Description: Absence of physical injury  6/19/2021 1152 by Geraldine Acosta RN  Outcome: Ongoing  6/19/2021 0420 by Luli Corcoran RN  Outcome: Ongoing     Problem: Daily Care:  Goal: Daily care needs are met  Description: Daily care needs are met  Outcome: Ongoing

## 2021-06-19 NOTE — PROGRESS NOTES
PT admitted to room 219. Vitals stable upon arrival. Medications administered. Assessment complete. Please refer to flow sheet for assessment details, See [facility] for medication admin details. Pt oriented to room and how to utilize the call light. Call light within reach, bed alarm set, will continue to monitor.

## 2021-06-19 NOTE — PROGRESS NOTES
Pt able to answer admission questions. Cecy Cheema provided for pt to ambulate safely. Bed alarm on.

## 2021-06-19 NOTE — PROGRESS NOTES
Inpatient Physical Therapy Evaluation and Treatment    Unit: [address]  Date:  6/19/2021  Patient Name:    Milton Lowry  Admitting diagnosis:  Toxic metabolic encephalopathy [Y72]  Admit Date:  6/18/2021  Precautions/Restrictions/WB Status/ Lines/ Wounds/ Oxygen: Fall risk, Bed/chair alarm and Lines -IV and Supplemental O2 (2L)    Treatment Time:  5949-1128  Treatment Number:  1   Timed Code Treatment Minutes: 20 minutes  Total Treatment Minutes:  30  minutes    Patient Goals for Therapy: \" Decrease pain \"          Discharge Recommendations: SNF  (Pt reports she will be able to get 24/7 assist with daughter staying while  works, Pt will benefit from therapy to increase strength)    DME needs for discharge: Needs Met       Therapy recommendation for EMS Transport: can transport by wheelchair    Therapy recommendations for staff:   Assist of 1 with use of rolling walker (RW) for all transfers to/from bathroom    History of Present Illness: Per H&P Dr. Daphne Garcia 6/18/21: 74 y. o. female who presented to the hospital with a chief complaint of worsening altered mental status.  When I saw the patient she was difficult to arouse but arousable.  Most of the history was obtained from her  who was at the bedside.  The patient apparently has had right sciatic pain over the last couple of weeks.  The pain has been intolerable and debilitating.  She saw her physician who prescribed her baclofen and Naprosyn.  She has been taking these medications routinely and today only had 1 out of 60 tabs of baclofen left from the last month.  The  also mentioned to the patient has been having increased confusion and mental status changes over the last couple of days. Abdirashid Osborne does have a history of cryptogenic organizing pneumonia and follows with pulmonary as an outpatient. Abdirashid Osborne is chronically on oxygen therapy.  In the emergency department work-up was pretty much unremarkable except for an VIRGINIE and clinical symptoms of dehydration.  She will be admitted for observation and medical evaluation. Home Health S4 Level Recommendation:  Level 3 Safety  AM-PAC Mobility Score    AM-PAC Inpatient Mobility Raw Score : 18       Preadmission Environment    Per OT eval 19:  Pt. Linda Ragland spouse & 1 dogs and a cat (Pt works during day 4pm-1am)  Home environment:   1 story home   Steps to enter first floor: 3 Steps to enter and One Hand rail  Steps to second floor: N/A  Bathroom: Walk in [address] [facility] [employer] [address], Grab bars, Shower Chair  and comfort height toilet without grab bars   Equipment owned: rollator, Shower Chair, lift chair, home O2 (2 L) continuous, pulse ox and nebulizer      Preadmission Status:  Pt. Able to drive: No  and daughter drive Pt  Pt Fully independent with ADLs: Yes  Pt. Required assistance from family for:Pt and spouse share cooking, laundry, cleaning. pt's spouse completes the yard work   Pt. Fully independent for transfers and gait and walked with rollator  History of falls Yes, recently holding onto couch and chair. Pt stays in chair mostly when spouse is at work in the evenings. Daughter works for everyday homecare, but may be able to provide assist at home. Hobbies: reading/listening to audio books    Pain   Yes  Location: Back  Ratin /10  Pain Medicine Status: RN notified, warm blanket provided to area for pain relief    Pt reports increasing pain in lower back for ~ 2 mo. Has been going to chiropractor for ~ 2 weeks without improvement. Unknown AMBROCIO. Pt unable to find position of comfort for pain relief. Reports some pain relief with hot pack at home. Cognition    A&O orientation not directly assessed. Able to follow 2 step commands    Subjective  Patient lying supine in bed with spouse present. Pt agreeable to this PT eval & tx. Upper Extremity ROM/Strength  Please see OT evaluation.       Lower Extremity ROM / Strength   AROM WFL: Yes  ROM limitations:     Strength Assessment (measured on a 0-5 scale):  R LE   Quad   4   Ant Tib  4-   Hamstring 3+   Iliopsoas 4    Great toe extension: 3+  L LE  Quad   4+   Ant Tib  4+   Hamstring 4   Iliopsoas 4+    Great toe extension: 4+    Difference noted in L4, L5 (R weaker than L)     Lower Extremity Sensation    WNL -BLE, no difference noted in L2, L3, L4, L5, and S1   Intact to light touch BLE    Lower Extremity Proprioception:   WFL    Coordination and Tone  WFL    Balance  Sitting:  Good ; Independent  Comments: ~ 15 min at EOB    Standing: Fair +; SBA  Comments: ~ 15 sec unsupported at edge of sink to wash hands    Bed Mobility   Supine to Sit:    Min A  with HOB elevated and use of handrail  Sit to Supine:   SBA with HOB elevated and use of handrail  Rolling:   Not Tested  Scooting in sitting: Independent  Scooting in supine:  Independent    Transfer Training     Sit to stand:   CGA  Stand to sit:   CGA  Bed to Chair:   Not Tested with use of N/A    Gait gait completed as indicated below  Distance:      20 ft + 20 ft  Deviations (firm surface/linoleum):  decreased mike, narrow BRY, forward flexed posture and decreased step length bilaterally, bilateral out-toeing  Assistive Device Used:    rolling walker (RW)  Level of Assist:    CGA  Comment: Total assist for O2 line management    Stair Training deferred, pt unsafe/ not appropriate to complete stairs at this time    Activity Tolerance   Pt completed therapy session with Pain noted with ambulation and mobility, minimal improvement with rest    Positioning Needs   Pt in bed, no alarm needed, positioned in proper neutral alignment and pressure relief provided. Call light provided and all needs within reach    Exercises Initiated  Dimitry deferred secondary to pain following mobility  NA   Educated on windshield wiper exercises in hook-lying dependent on pain tolerance as a ROM exercise    Other  None.      Patient/Family Education   Pt educated on role of inpatient PT, POC, importance of continued activity, safety awareness, transfer techniques, pursed lip breathing, energy conservation, HEP and calling for assist with mobility. Assessment  Pt seen for Physical Therapy evaluation in acute care setting. Pt demonstrated decreased Activity tolerance, Balance, ROM, Safety and Strength as well as decreased independence with Ambulation, Bed Mobility  and Transfers. Pt requiring total assist with O2 line management and demonstrates decreased stability with ambulation. Noted weakness in L4 and L5 myotome testing on RLE compared to LLE. Sensation WNL bilaterally in LEs. Recommending SNF upon discharge as patient functioning well below baseline, demonstrates good rehab potential and unable to return home due to home environment not conducive to patient recovery. If pt chooses to return home she would benefit from 24/7 assistance and home PT. Goals : To be met in 3 visits:  1). Independent with LE Ex x 10 reps    To be met in 6 visits:  1). Supine to/from sit: Independent  2). Sit to/from stand: Supervision  3). Bed to chair: Supervision  4). Gait: Ambulate 50 ft.  with  Supervision and use of LRAD (least restrictive assistive device)  5). Tolerate B LE exercises 3 sets of 10-15 reps  6). Ascend/descend 3 steps with CGA with use of hand rail unilateral and LRAD (least restrictive assistive device)    Rehabilitation Potential: Good  Strengths for achieving goals include:   Pt motivated, PLOF, Family Support and Pt cooperative   Barriers to achieving goals include:    Pain and Weakness    Plan    To be seen 3-5 x / week  while in acute care setting for therapeutic exercises, bed mobility, transfers, progressive gait training, balance training, and family/patient education. Signature: Thuan Castro PT, DPT #[id number]    If patient discharges from this facility prior to next visit, this note will serve as the Discharge Summary.

## 2021-06-20 VITALS
OXYGEN SATURATION: 94 % | DIASTOLIC BLOOD PRESSURE: 69 MMHG | SYSTOLIC BLOOD PRESSURE: 112 MMHG | TEMPERATURE: 98.7 F | BODY MASS INDEX: 37.56 KG/M2 | RESPIRATION RATE: 18 BRPM | WEIGHT: 212 LBS | HEART RATE: 73 BPM | HEIGHT: 63 IN

## 2021-06-20 LAB
ANION GAP SERPL CALCULATED.3IONS-SCNC: 8 MMOL/L (ref 3–16)
BUN BLDV-MCNC: 10 MG/DL (ref 7–20)
CALCIUM SERPL-MCNC: 8.5 MG/DL (ref 8.3–10.6)
CHLORIDE BLD-SCNC: 104 MMOL/L (ref 99–110)
CO2: 21 MMOL/L (ref 21–32)
CREAT SERPL-MCNC: 0.6 MG/DL (ref 0.6–1.2)
GFR AFRICAN AMERICAN: >60
GFR NON-AFRICAN AMERICAN: >60
GLUCOSE BLD-MCNC: 130 MG/DL (ref 70–99)
GLUCOSE BLD-MCNC: 92 MG/DL (ref 70–99)
GLUCOSE BLD-MCNC: 97 MG/DL (ref 70–99)
PERFORMED ON: ABNORMAL
PERFORMED ON: NORMAL
POTASSIUM REFLEX MAGNESIUM: 4 MMOL/L (ref 3.5–5.1)
SODIUM BLD-SCNC: 133 MMOL/L (ref 136–145)

## 2021-06-20 PROCEDURE — 6370000000 HC RX 637 (ALT 250 FOR IP): Performed by: INTERNAL MEDICINE

## 2021-06-20 PROCEDURE — 99217 PR OBSERVATION CARE DISCHARGE MANAGEMENT: CPT | Performed by: INTERNAL MEDICINE

## 2021-06-20 PROCEDURE — 2580000003 HC RX 258: Performed by: INTERNAL MEDICINE

## 2021-06-20 PROCEDURE — 96372 THER/PROPH/DIAG INJ SC/IM: CPT

## 2021-06-20 PROCEDURE — 80048 BASIC METABOLIC PNL TOTAL CA: CPT

## 2021-06-20 PROCEDURE — 6360000002 HC RX W HCPCS: Performed by: INTERNAL MEDICINE

## 2021-06-20 PROCEDURE — 36415 COLL VENOUS BLD VENIPUNCTURE: CPT

## 2021-06-20 PROCEDURE — G0378 HOSPITAL OBSERVATION PER HR: HCPCS

## 2021-06-20 RX ORDER — LIDOCAINE 4 G/G
2 PATCH TOPICAL DAILY
Qty: 60 PATCH | Refills: 1 | Status: SHIPPED | OUTPATIENT
Start: 2021-06-21 | End: 2022-05-06

## 2021-06-20 RX ORDER — OXYCODONE HYDROCHLORIDE AND ACETAMINOPHEN 5; 325 MG/1; MG/1
1 TABLET ORAL EVERY 4 HOURS PRN
Qty: 25 TABLET | Refills: 0 | Status: SHIPPED | OUTPATIENT
Start: 2021-06-20 | End: 2021-06-25

## 2021-06-20 RX ADMIN — NADOLOL 20 MG: 40 TABLET ORAL at 10:16

## 2021-06-20 RX ADMIN — SERTRALINE 200 MG: 100 TABLET, FILM COATED ORAL at 10:15

## 2021-06-20 RX ADMIN — LEVOTHYROXINE SODIUM 150 MCG: 0.15 TABLET ORAL at 10:16

## 2021-06-20 RX ADMIN — PANTOPRAZOLE SODIUM 40 MG: 40 TABLET, DELAYED RELEASE ORAL at 05:18

## 2021-06-20 RX ADMIN — SODIUM CHLORIDE: 9 INJECTION, SOLUTION INTRAVENOUS at 02:04

## 2021-06-20 RX ADMIN — OXYCODONE HYDROCHLORIDE AND ACETAMINOPHEN 1 TABLET: 5; 325 TABLET ORAL at 06:12

## 2021-06-20 RX ADMIN — ENOXAPARIN SODIUM 40 MG: 40 INJECTION, SOLUTION INTRAVENOUS; SUBCUTANEOUS at 10:15

## 2021-06-20 RX ADMIN — Medication 10 ML: at 10:18

## 2021-06-20 RX ADMIN — OXYCODONE HYDROCHLORIDE AND ACETAMINOPHEN 1 TABLET: 5; 325 TABLET ORAL at 02:05

## 2021-06-20 ASSESSMENT — PAIN SCALES - GENERAL
PAINLEVEL_OUTOF10: 6
PAINLEVEL_OUTOF10: 8
PAINLEVEL_OUTOF10: 8
PAINLEVEL_OUTOF10: 5
PAINLEVEL_OUTOF10: 0
PAINLEVEL_OUTOF10: 0

## 2021-06-20 ASSESSMENT — PAIN DESCRIPTION - ORIENTATION: ORIENTATION: RIGHT;LOWER

## 2021-06-20 ASSESSMENT — PAIN DESCRIPTION - PAIN TYPE: TYPE: CHRONIC PAIN

## 2021-06-20 ASSESSMENT — PAIN DESCRIPTION - LOCATION: LOCATION: BACK

## 2021-06-20 NOTE — PLAN OF CARE
Problem: Falls - Risk of:  Goal: Will remain free from falls  Description: Will remain free from falls  6/20/2021 1226 by Zohreh Jansen RN  Outcome: Completed  6/20/2021 0409 by Clarisse Ricci RN  Outcome: Ongoing  Goal: Absence of physical injury  Description: Absence of physical injury  Outcome: Completed     Problem: Daily Care:  Goal: Daily care needs are met  Description: Daily care needs are met  Outcome: Completed     Problem: Pain:  Goal: Pain level will decrease  Description: Pain level will decrease  Outcome: Completed  Goal: Control of acute pain  Description: Control of acute pain  Outcome: Completed  Goal: Control of chronic pain  Description: Control of chronic pain  Outcome: Completed

## 2021-06-20 NOTE — ACP (ADVANCE CARE PLANNING)
Advance Care Planning   Healthcare Decision Maker:    Primary Decision Maker: Davina Valerio - [facility] - [phone]    Click here to complete Healthcare Decision Makers including selection of the Healthcare Decision Maker Relationship (ie \"Primary\").

## 2021-06-20 NOTE — PLAN OF CARE
Problem: Falls - Risk of:  Goal: Will remain free from falls  Description: Will remain free from falls  Outcome: Ongoing   Fall precautions in place, bed alarm on, nonskid foot wear applied, bed in lowest position, and call light within reach. Will continue to monitor.

## 2021-06-20 NOTE — PROGRESS NOTES
Shift assessment complete- see flowsheets. Pt is A&Ox4. VSS  Respirations are easy, even and unlabored. Pt is on 2L NC which she wears at home. PIV WDL. NS infusing at 75mL/hr. Plan of care and goals reviewed. Call light within reach. Bed in lowest position with wheels locked. No needs expressed at this time.

## 2021-06-20 NOTE — PROGRESS NOTES
Paperwork provided to patient and explained. Patient agrees and paperwork signed. IV removed. Pt going home with . Pt transported via wheelchair.

## 2021-06-23 ENCOUNTER — OFFICE VISIT (OUTPATIENT)
Dept: FAMILY MEDICINE CLINIC | Age: 66
End: 2021-06-23
Payer: MEDICARE

## 2021-06-23 VITALS
BODY MASS INDEX: 36.92 KG/M2 | OXYGEN SATURATION: 95 % | SYSTOLIC BLOOD PRESSURE: 136 MMHG | HEART RATE: 115 BPM | TEMPERATURE: 97.6 F | DIASTOLIC BLOOD PRESSURE: 78 MMHG | WEIGHT: 208.4 LBS

## 2021-06-23 DIAGNOSIS — M54.31 SCIATICA OF RIGHT SIDE: ICD-10-CM

## 2021-06-23 DIAGNOSIS — E11.9 TYPE 2 DIABETES MELLITUS WITHOUT COMPLICATION, WITHOUT LONG-TERM CURRENT USE OF INSULIN (HCC): ICD-10-CM

## 2021-06-23 DIAGNOSIS — Z09 HOSPITAL DISCHARGE FOLLOW-UP: ICD-10-CM

## 2021-06-23 DIAGNOSIS — N17.9 ACUTE KIDNEY INJURY (HCC): Primary | ICD-10-CM

## 2021-06-23 DIAGNOSIS — J18.9 PNEUMONIA DUE TO INFECTIOUS ORGANISM, UNSPECIFIED LATERALITY, UNSPECIFIED PART OF LUNG: ICD-10-CM

## 2021-06-23 DIAGNOSIS — E86.0 DEHYDRATION: ICD-10-CM

## 2021-06-23 DIAGNOSIS — G93.41 METABOLIC ENCEPHALOPATHY: ICD-10-CM

## 2021-06-23 PROCEDURE — 1111F DSCHRG MED/CURRENT MED MERGE: CPT | Performed by: FAMILY MEDICINE

## 2021-06-23 PROCEDURE — 99496 TRANSJ CARE MGMT HIGH F2F 7D: CPT | Performed by: FAMILY MEDICINE

## 2021-06-23 ASSESSMENT — PATIENT HEALTH QUESTIONNAIRE - PHQ9
SUM OF ALL RESPONSES TO PHQ9 QUESTIONS 1 & 2: 0
1. LITTLE INTEREST OR PLEASURE IN DOING THINGS: 0
SUM OF ALL RESPONSES TO PHQ QUESTIONS 1-9: 0
SUM OF ALL RESPONSES TO PHQ QUESTIONS 1-9: 0
2. FEELING DOWN, DEPRESSED OR HOPELESS: 0
SUM OF ALL RESPONSES TO PHQ QUESTIONS 1-9: 0

## 2021-06-23 NOTE — PROGRESS NOTES
Post-Discharge Transitional Care Management Services or Hospital Follow Up      José Manuel Crowley   YOB: 1955    Date of Office Visit:  6/23/2021  Date of Hospital Admission: 6/18/21  Date of Hospital Discharge: 6/20/21  Risk of hospital readmission (high >=14%.  Medium >=10%) :No data recorded    Care management risk score Rising risk (score 2-5) and Complex Care (Scores >=6): 4     Non face to face  following discharge, date last encounter closed (first attempt may have been earlier): Patient was discharged 2 business days ago  Call initiated 2 business days of discharge: Patient was discharged 2 business days ago    Patient Active Problem List   Diagnosis    Chest pain    HTN (hypertension)    Hyperlipidemia with target LDL less than 130    Insomnia    Trochanteric bursitis of both hips    Migraine    Syncope    Acute blood loss anemia    Fatigue    Hypothyroidism    Mild single current episode of major depressive disorder ([address]    Anxiety    HCAP (healthcare-associated pneumonia)    A-fib ([address]    Atypical pneumonia    Acute bronchitis with bronchospasm    Acute on chronic diastolic congestive heart failure (HCC)    Class 2 obesity with body mass index (BMI) of 39.0 to 39.9 in adult    Abnormal chest CT    Acute diastolic heart failure (HCC)    ILD (interstitial lung disease) (HCC)    Acute on chronic respiratory failure with hypoxia (HCC)    Restrictive lung disease    Abnormal CT of the chest    SOB (shortness of breath)    Acute cystitis without hematuria    Shortness of breath    Chronic respiratory failure with hypoxia (HCC)    Cryptogenic organizing pneumonia ([address]    Pneumothorax of left lung after biopsy    COPD (chronic obstructive pulmonary disease) (HCC)    Type 2 diabetes mellitus without complication (HCC)    Hyponatremia    Numbness and tingling in left hand    Ulnar neuropathy at elbow of left upper extremity    Chronic congestive heart failure ([address]    Left wrist pain    Left wrist tendinitis    GERD (gastroesophageal reflux disease)    Toxic metabolic encephalopathy    VIRGINIE (acute kidney injury) (HCC)    Lumbar radiculopathy       Allergies   Allergen Reactions    Penicillins Anaphylaxis     Tolerates Meropenem.  Cefuroxime      Tolerates Meropenem.  Doxycycline Hives    Imitrex [Sumatriptan] Other (See Comments)     Feels like pins and needles    Meperidine     Sulfa Antibiotics Hives    Keflex [Cephalexin] Itching and Rash     Tolerates Meropenem.  Tape Tulio Michael Tape] Rash       Medications listed as ordered at the time of discharge from [facility] Medication Instructions MARKOS:    Printed on:06/23/21 1313   Medication Information                      albuterol (PROVENTIL) (2.5 MG/3ML) 0.083% nebulizer solution  INHALE 3 MLS BY NEBULIZATION EVERY 6 HOURS AS NEEDED FOR WHEEZING             albuterol sulfate HFA (PROVENTIL HFA) 108 (90 Base) MCG/ACT inhaler  Inhale 2 puffs into the lungs every 6 hours as needed for Wheezing             amitriptyline (ELAVIL) 25 MG tablet  TAKE 1 TABLET BY MOUTH EVERY DAY AT NIGHT             benzonatate (TESSALON) 200 MG capsule  TAKE 1 CAPSULE BY MOUTH 3 TIMES A DAY AS NEEDED FOR COUGH             blood glucose test strips (ONETOUCH VERIO) strip  1 each by In Vitro route daily As needed. blood glucose test strips (ONETOUCH VERIO) strip  1 each by In Vitro route 2 times daily As needed.              diclofenac sodium (VOLTAREN) 1 % GEL  Apply 2 g topically 4 times daily             empagliflozin (JARDIANCE) 25 MG tablet  Take 1 tablet by mouth daily             FLUTICASONE FUROATE IN  Inhale into the lungs as needed             hydrOXYzine (ATARAX) 10 MG tablet  TAKE 1 TO 3 TABLETS BY MOUTH 3 TIMES DAILY AS NEEDED FOR ITCHING             INSULIN SYRINGE 1CC/29G 29G X 1/2\" 1 ML MISC  1 each by Does not apply route 2 times daily             levothyroxine (SYNTHROID) 150 MCG tablet  TAKE 1 TABLET BY MOUTH EVERY DAY             lidocaine 4 % external patch  Place 2 patches onto the skin daily             Loratadine-Pseudoephedrine (LORATADINE-D 12HR PO)  Take 1 tablet by mouth daily             mirtazapine (REMERON) 30 MG tablet  Take 1 tablet by mouth nightly             Multiple Vitamins-Minerals (MULTIVITAMIN ADULT PO)  Take by mouth daily             nadolol (CORGARD) 20 MG tablet  Take 1 tablet by mouth daily             oxyCODONE-acetaminophen (PERCOCET) 5-325 MG per tablet  Take 1 tablet by mouth every 4 hours as needed for Pain (right sided sciatica) for up to 5 days. OXYGEN  Inhale 3 L into the lungs             pantoprazole (PROTONIX) 40 MG tablet  Take 1 tablet by mouth every morning (before breakfast)             sertraline (ZOLOFT) 100 MG tablet  Take 2 tablets by mouth daily             SITagliptin (JANUVIA) 100 MG tablet  Take 1 tablet by mouth daily             sodium chloride (OCEAN, BABY AYR) 0.65 % nasal spray  1 spray by Nasal route as needed for Congestion                   Medications marked \"taking\" at this time  No outpatient medications have been marked as taking for the 6/23/21 encounter (Office Visit) with Phil Harrington MD.        Medications patient taking as of now reconciled against medications ordered at time of hospital discharge: Yes    Chief Complaint   Patient presents with   [address] from Hospital       History of Present illness - Follow up of Hospital diagnosis(es): Possible pneumonia, toxic encephalopathy, acute kidney injury, dehydration, diabetes, sciatica    Inpatient course: Discharge summary reviewed- see chart. Interval history/Current status: Much improved. Patient started physical therapy yesterday for sciatica. Mental status normal.    A comprehensive review of systems was negative except for what was noted in the HPI.     Vitals:    06/23/21 1105 06/23/21 1125   BP: (!) 140/80 136/78   Site: Left Upper Arm

## 2021-06-23 NOTE — DISCHARGE SUMMARY
Name:  Zach Osei  Room:  0219/0219-01  MRN:    [id number]    Discharge Summary      This discharge summary is in conjunction with a complete physical exam done on the day of discharge. Discharging Physician: Dr. Arabella Elias: 6/18/2021  Discharge:  6/20/2021    HPI taken from admission H&P:      74 y.o. female who presented to the hospital with a chief complaint of worsening altered mental status. When I saw the patient she was difficult to arouse but arousable. Most of the history was obtained from her  who was at the bedside. The patient apparently has had right sciatic pain over the last couple of weeks. The pain has been intolerable and debilitating. She saw her physician who prescribed her baclofen and Naprosyn. She has been taking these medications routinely and today only had 1 out of 60 tabs of baclofen left from the last month. The  also mentioned to the patient has been having increased confusion and mental status changes over the last couple of days. She does have a history of cryptogenic organizing pneumonia and follows with pulmonary as an outpatient. She is chronically on oxygen therapy. In the emergency department work-up was pretty much unremarkable except for an VIRGINIE and clinical symptoms of dehydration. She will be admitted for observation and medical evaluation. Diagnoses this Admission and Hospital Course     Toxic metabolic encephalopathy - Resolved  - 2/2 medications and VIRGINIE  - Baclofen and naproxen discontinued.   - She presented with increasing somnolence and mentation changes , this resolved now and mental status clear     VIRGINIE - Resolved  - 2/2 NSAID use  - Continued IV hydration  - Stopped naproxen  - BUN and creatinine improved from 35 and 1.4 to 10 and 0.6  - stopped Lasix at d/c, discontinued Naproxen as above     Low back pain with radiculopathy  Sciatica  - Ordered topical lidocaine patch  - Mental status improved - discharged with Percocet and Lidocaine patch     Diabetes mellitus type 2  -SSI     Obesity  - Body mass index is 37.55 kg/m². - Counseled on weight loss.      Cryptogenic organizing pneumonia  Chronic hypoxic respiratory failure on O2 2 L  -CT chest reviewed. No active pneumonia  - she has chronic fibrotic changes       Procedures (Please Review Full Report for Details)  None    Consults    None    Physical Exam at Discharge:    /69   Pulse 73   Temp 98.7 °F (37.1 °C) (Oral)   Resp 18   Ht 5' 3\" (1.6 m)   Wt 212 lb (96.2 kg)   SpO2 94%   BMI 37.55 kg/m²   General:  Awake, alert, oriented . No distress   Skin:  Warm and dry  Neck:  JVD absent. Neck supple  Chest:  Clear to auscultation, respiration easy. No wheezes, rales or rhonchi. Cardiovascular:  RRR ,S1S2 normal  Abdomen:  Soft, non tender, non distended, BS +  Pain in lower back and right lower back with pain radiating to the right hip  Extremities:  No edema. Intact peripheral pulses.  Brisk cap refill, < 2 secs  Neuro: non focal    CULTURES    SARS-COV-2 - Rapid PCR: Not detected    Rapid Influenza A/B: negative       Lab Data:  CBC:        Recent Labs     06/18/21  1536 06/19/21  0553   WBC 10.1 8.9   RBC 4.62 4.30   HGB 11.0* 10.2*   HCT 34.7* 32.4*   MCV 75.2* 75.5*   RDW 19.7* 19.5*    193      BMP:         Recent Labs     06/18/21  1536 06/19/21  0553 06/20/21  0605    140 133*   K 4.5 4.1  4.1 4.0    106 104   CO2 24 23 21   PHOS  --  3.9  --    BUN 35* 24* 10   CREATININE 1.4* 1.0 0.6      CARDIAC ENZYMES:       Recent Labs     06/18/21  1536   TROPONINI <0.01      FASTING LIPID PANEL:        Lab Results   Component Value Date     CHOL 196 11/23/2020     HDL 52 (L) 11/23/2020     TRIG 98 11/23/2020      LIVER PROFILE:   Recent Labs     06/18/21  1536   AST 34   ALT 16   BILITOT 0.4   ALKPHOS 124           RADIOLOGY    CT CHEST WO CONTRAST 6/18/2021   Final Result   Stable fibrotic changes throughout the lungs with stable small mediastinal   nodes. Cirrhotic appearing liver         CT HEAD WO CONTRAST 6/18/2021   Final Result   No acute intracranial abnormality. XR CHEST PORTABLE 6/18/2021   Final Result   Increased right upper lobe opacity could represent progressive fibrotic   changes with volume loss or acute infection           Discharge Medications     Medication List      START taking these medications    lidocaine 4 % external patch  Place 2 patches onto the skin daily     oxyCODONE-acetaminophen 5-325 MG per tablet  Commonly known as: PERCOCET  Take 1 tablet by mouth every 4 hours as needed for Pain (right sided sciatica) for up to 5 days. Notes to patient: oxyCODONE-acetaminophen (Percocet)  Use:  pain    Side effects: sleepiness, constipation        CONTINUE taking these medications    * albuterol sulfate  (90 Base) MCG/ACT inhaler  Commonly known as: Proventil HFA  Inhale 2 puffs into the lungs every 6 hours as needed for Wheezing     * albuterol (2.5 MG/3ML) 0.083% nebulizer solution  Commonly known as: PROVENTIL  INHALE 3 MLS BY NEBULIZATION EVERY 6 HOURS AS NEEDED FOR WHEEZING     amitriptyline 25 MG tablet  Commonly known as: ELAVIL  TAKE 1 TABLET BY MOUTH EVERY DAY AT NIGHT     benzonatate 200 MG capsule  Commonly known as: TESSALON  TAKE 1 CAPSULE BY MOUTH 3 TIMES A DAY AS NEEDED FOR COUGH     * blood glucose test strips strip  Commonly known as: OneTouch Verio  1 each by In Vitro route daily As needed. * blood glucose test strips strip  Commonly known as: OneTouch Verio  1 each by In Vitro route 2 times daily As needed.      diclofenac sodium 1 % Gel  Commonly known as: VOLTAREN  Apply 2 g topically 4 times daily     empagliflozin 25 MG tablet  Commonly known as: JARDIANCE  Take 1 tablet by mouth daily     FLUTICASONE FUROATE IN     hydrOXYzine 10 MG tablet  Commonly known as: ATARAX  TAKE 1 TO 3 TABLETS BY MOUTH 3 TIMES DAILY AS NEEDED FOR ITCHING     INSULIN SYRINGE 1CC/29G 29G X 1/2\" 1 ML Misc  1 each by Does not apply route 2 times daily     levothyroxine 150 MCG tablet  Commonly known as: SYNTHROID  TAKE 1 TABLET BY MOUTH EVERY DAY     LORATADINE-D 12HR PO     mirtazapine 30 MG tablet  Commonly known as: REMERON  Take 1 tablet by mouth nightly     MULTIVITAMIN ADULT PO     nadolol 20 MG tablet  Commonly known as: CORGARD  Take 1 tablet by mouth daily     OXYGEN     pantoprazole 40 MG tablet  Commonly known as: PROTONIX  Take 1 tablet by mouth every morning (before breakfast)     sertraline 100 MG tablet  Commonly known as: ZOLOFT  Take 2 tablets by mouth daily     SITagliptin 100 MG tablet  Commonly known as: JANUVIA  Take 1 tablet by mouth daily     sodium chloride 0.65 % nasal spray  Commonly known as: OCEAN, BABY AYR         * This list has 4 medication(s) that are the same as other medications prescribed for you. Read the directions carefully, and ask your doctor or other care provider to review them with you. STOP taking these medications    baclofen 10 MG tablet  Commonly known as: LIORESAL     furosemide 40 MG tablet  Commonly known as: LASIX     naproxen 500 MG tablet  Commonly known as: NAPROSYN     potassium chloride 10 MEQ extended release tablet  Commonly known as: KLOR-CON M     traZODone 50 MG tablet  Commonly known as: DESYREL           Where to Get Your Medications      You can get these medications from any pharmacy    Bring a paper prescription for each of these medications  · lidocaine 4 % external patch  · oxyCODONE-acetaminophen 5-325 MG per tablet           Discharged in stable condition to home. Follow Up: Follow up with PCP in 1 week.          Patrick Kunz MD   6/20/21

## 2021-07-20 DIAGNOSIS — J98.4 RESTRICTIVE LUNG DISEASE: ICD-10-CM

## 2021-07-21 RX ORDER — ALBUTEROL SULFATE 2.5 MG/3ML
SOLUTION RESPIRATORY (INHALATION)
Qty: 720 ML | Refills: 1 | Status: SHIPPED | OUTPATIENT
Start: 2021-07-21 | End: 2021-10-25 | Stop reason: SDUPTHER

## 2021-07-26 ENCOUNTER — TELEPHONE (OUTPATIENT)
Dept: PULMONOLOGY | Age: 66
End: 2021-07-26

## 2021-07-26 NOTE — TELEPHONE ENCOUNTER
Patient cancelled appointment on 7/27/21 with Dr. Ivelisse Mejia for 6 month f/u. Reason: not feeling well    Patient did reschedule appointment. Appointment rescheduled for November 2,2021. Last OV 1/19/2021      ASSESSMENT:  · Cryptogenic organizing pneumonia   · Chronic hypoxic respiratory failure -resolved  · Cough and Fever has been intermittently associated with her condition  · Restrictive lung disease  · Chronic Rhinitis with PND   · Obesity  · Afib     PLAN:   · Rx for tessalon pearles  · Her condition has been steroid responsive  · Completed Prednisone 40mg for 4-6 weeks, 30mg for 1 month, 20mg for 1 month, 15mg for 6 weeks, 10mg for 6 weeks, 5mg for 8 weeks, 2.5mg daily for 2 weeks and  2.5mg QOD for 4 weeks. Stopped Prednisone 5/2020  · Continue sinus regimen: flonase and Claritin   · Continue Lasix  · PRN albuterol and albuterol nebs  · Mold remediated  · CTPA done in the emergency room at the end of September showed stable changes and she does not need the currently scheduled scan in November. F/u in 3 months     I affirm this is a Patient Initiated Episode with a Patient who has not had a related appointment within my department in the past 7 days or scheduled within the next 24 hours.   Patient identification was verified at the start of the visit: Yes  Total Time: 11 min

## 2021-08-04 ENCOUNTER — OFFICE VISIT (OUTPATIENT)
Dept: FAMILY MEDICINE CLINIC | Age: 66
End: 2021-08-04
Payer: MEDICARE

## 2021-08-04 VITALS
WEIGHT: 197.8 LBS | SYSTOLIC BLOOD PRESSURE: 130 MMHG | BODY MASS INDEX: 35.04 KG/M2 | HEART RATE: 96 BPM | OXYGEN SATURATION: 96 % | TEMPERATURE: 97.8 F | DIASTOLIC BLOOD PRESSURE: 82 MMHG

## 2021-08-04 DIAGNOSIS — M54.16 LUMBAR RADICULOPATHY: Primary | ICD-10-CM

## 2021-08-04 DIAGNOSIS — E03.9 ACQUIRED HYPOTHYROIDISM: ICD-10-CM

## 2021-08-04 DIAGNOSIS — E87.1 HYPONATREMIA: ICD-10-CM

## 2021-08-04 PROCEDURE — G8417 CALC BMI ABV UP PARAM F/U: HCPCS | Performed by: FAMILY MEDICINE

## 2021-08-04 PROCEDURE — 3017F COLORECTAL CA SCREEN DOC REV: CPT | Performed by: FAMILY MEDICINE

## 2021-08-04 PROCEDURE — 1123F ACP DISCUSS/DSCN MKR DOCD: CPT | Performed by: FAMILY MEDICINE

## 2021-08-04 PROCEDURE — 99214 OFFICE O/P EST MOD 30 MIN: CPT | Performed by: FAMILY MEDICINE

## 2021-08-04 PROCEDURE — 4040F PNEUMOC VAC/ADMIN/RCVD: CPT | Performed by: FAMILY MEDICINE

## 2021-08-04 PROCEDURE — G8400 PT W/DXA NO RESULTS DOC: HCPCS | Performed by: FAMILY MEDICINE

## 2021-08-04 PROCEDURE — 1090F PRES/ABSN URINE INCON ASSESS: CPT | Performed by: FAMILY MEDICINE

## 2021-08-04 PROCEDURE — G8428 CUR MEDS NOT DOCUMENT: HCPCS | Performed by: FAMILY MEDICINE

## 2021-08-04 PROCEDURE — 1036F TOBACCO NON-USER: CPT | Performed by: FAMILY MEDICINE

## 2021-08-04 RX ORDER — TRAMADOL HYDROCHLORIDE 50 MG/1
50-100 TABLET ORAL EVERY 6 HOURS PRN
Qty: 56 TABLET | Refills: 0 | Status: SHIPPED | OUTPATIENT
Start: 2021-08-04 | End: 2021-09-23 | Stop reason: SDUPTHER

## 2021-08-04 ASSESSMENT — PATIENT HEALTH QUESTIONNAIRE - PHQ9
SUM OF ALL RESPONSES TO PHQ9 QUESTIONS 1 & 2: 0
SUM OF ALL RESPONSES TO PHQ QUESTIONS 1-9: 0
SUM OF ALL RESPONSES TO PHQ QUESTIONS 1-9: 0
2. FEELING DOWN, DEPRESSED OR HOPELESS: 0
1. LITTLE INTEREST OR PLEASURE IN DOING THINGS: 0
SUM OF ALL RESPONSES TO PHQ QUESTIONS 1-9: 0

## 2021-08-04 NOTE — PATIENT INSTRUCTIONS

## 2021-08-05 DIAGNOSIS — E03.9 ACQUIRED HYPOTHYROIDISM: ICD-10-CM

## 2021-08-05 LAB
BUN BLDV-MCNC: 12 MG/DL (ref 3–29)
BUN/CREAT BLD: 15 (ref 7–25)
CALCIUM SERPL-MCNC: 9.5 MG/DL (ref 8.5–10.5)
CHLORIDE BLD-SCNC: 103 MEQ/L (ref 96–110)
CO2: 24 MEQ/L (ref 19–32)
CREAT SERPL-MCNC: 0.8 MG/DL (ref 0.5–1.2)
FASTING STATUS: ABNORMAL
GFR AFRICAN AMERICAN: 88 MLS/MIN/1.73M2
GFR NON-AFRICAN AMERICAN: 76 MLS/MIN/1.73M2
GLUCOSE BLD-MCNC: 105 MG/DL (ref 70–99)
POTASSIUM SERPL-SCNC: 4.2 MEQ/L (ref 3.4–5.3)
SODIUM BLD-SCNC: 140 MEQ/L (ref 135–148)
TSH SERPL DL<=0.05 MIU/L-ACNC: 0.21 MCIU/ML (ref 0.4–4.5)

## 2021-08-05 RX ORDER — LEVOTHYROXINE SODIUM 0.12 MG/1
125 TABLET ORAL DAILY
Qty: 30 TABLET | Refills: 1 | Status: SHIPPED | OUTPATIENT
Start: 2021-08-05 | End: 2021-09-13 | Stop reason: SDUPTHER

## 2021-08-11 ENCOUNTER — HOSPITAL ENCOUNTER (OUTPATIENT)
Dept: MRI IMAGING | Age: 66
Discharge: HOME | End: 2021-08-11
Payer: MEDICARE

## 2021-08-11 DIAGNOSIS — M54.16 LUMBAR RADICULOPATHY: ICD-10-CM

## 2021-08-11 PROCEDURE — 72148 MRI LUMBAR SPINE W/O DYE: CPT

## 2021-08-12 DIAGNOSIS — M54.16 LUMBAR RADICULOPATHY: Primary | ICD-10-CM

## 2021-08-31 RX ORDER — ATORVASTATIN CALCIUM 40 MG/1
40 TABLET, FILM COATED ORAL DAILY
Qty: 90 TABLET | Refills: 0 | Status: SHIPPED | OUTPATIENT
Start: 2021-08-31 | End: 2021-11-17 | Stop reason: SDUPTHER

## 2021-09-08 ENCOUNTER — TELEPHONE (OUTPATIENT)
Dept: FAMILY MEDICINE CLINIC | Age: 66
End: 2021-09-08

## 2021-09-08 NOTE — TELEPHONE ENCOUNTER
I left msg for patient to call to verify if she is using True Metrix Level 1 crtl soln.   [facility] faxed request and I did not see it in her med list, present or past.

## 2021-09-09 RX ORDER — BLOOD-GLUCOSE CONTROL, LOW
EACH MISCELLANEOUS
Qty: 1 EACH | Refills: 2 | Status: SHIPPED | OUTPATIENT
Start: 2021-09-09

## 2021-09-10 ENCOUNTER — TELEPHONE (OUTPATIENT)
Dept: FAMILY MEDICINE CLINIC | Age: 66
End: 2021-09-10

## 2021-09-10 DIAGNOSIS — E11.9 TYPE 2 DIABETES MELLITUS WITHOUT COMPLICATION, WITHOUT LONG-TERM CURRENT USE OF INSULIN (HCC): Primary | ICD-10-CM

## 2021-09-10 RX ORDER — BLOOD-GLUCOSE METER
EACH MISCELLANEOUS
Qty: 1 EACH | Refills: 0 | Status: SHIPPED | OUTPATIENT
Start: 2021-09-10

## 2021-09-10 RX ORDER — GLUCOSAM/CHON-MSM1/C/MANG/BOSW 500-416.6
TABLET ORAL
Qty: 100 EACH | Refills: 3 | Status: SHIPPED | OUTPATIENT
Start: 2021-09-10

## 2021-09-13 DIAGNOSIS — E03.9 ACQUIRED HYPOTHYROIDISM: ICD-10-CM

## 2021-09-13 LAB — THYROTROPIN RELEASING HORMONE: 0.45 UIU/ML (ref 0.34–4.82)

## 2021-09-13 RX ORDER — LEVOTHYROXINE SODIUM 0.12 MG/1
125 TABLET ORAL DAILY
Qty: 90 TABLET | Refills: 0 | Status: SHIPPED | OUTPATIENT
Start: 2021-09-13 | End: 2021-11-17 | Stop reason: SDUPTHER

## 2021-09-13 RX ORDER — BENZONATATE 200 MG/1
CAPSULE ORAL
Qty: 90 CAPSULE | Refills: 0 | Status: SHIPPED | OUTPATIENT
Start: 2021-09-13 | End: 2021-11-02 | Stop reason: SDUPTHER

## 2021-09-20 ENCOUNTER — TELEPHONE (OUTPATIENT)
Dept: FAMILY MEDICINE CLINIC | Age: 66
End: 2021-09-20

## 2021-09-20 NOTE — TELEPHONE ENCOUNTER
When patient was here on August 4, 2021, we began a trial of tramadol. Since she needs a refill, she has to come in for an office visit and sign a medication contract before I can give her a refill.

## 2021-09-23 ENCOUNTER — OFFICE VISIT (OUTPATIENT)
Dept: FAMILY MEDICINE CLINIC | Age: 66
End: 2021-09-23
Payer: MEDICARE

## 2021-09-23 VITALS
HEART RATE: 89 BPM | DIASTOLIC BLOOD PRESSURE: 76 MMHG | WEIGHT: 191.4 LBS | BODY MASS INDEX: 33.9 KG/M2 | SYSTOLIC BLOOD PRESSURE: 124 MMHG | OXYGEN SATURATION: 94 % | TEMPERATURE: 97.3 F

## 2021-09-23 DIAGNOSIS — J01.00 ACUTE NON-RECURRENT MAXILLARY SINUSITIS: ICD-10-CM

## 2021-09-23 DIAGNOSIS — M54.16 LUMBAR RADICULOPATHY: Primary | ICD-10-CM

## 2021-09-23 PROCEDURE — G8427 DOCREV CUR MEDS BY ELIG CLIN: HCPCS | Performed by: FAMILY MEDICINE

## 2021-09-23 PROCEDURE — 3017F COLORECTAL CA SCREEN DOC REV: CPT | Performed by: FAMILY MEDICINE

## 2021-09-23 PROCEDURE — 4040F PNEUMOC VAC/ADMIN/RCVD: CPT | Performed by: FAMILY MEDICINE

## 2021-09-23 PROCEDURE — 1036F TOBACCO NON-USER: CPT | Performed by: FAMILY MEDICINE

## 2021-09-23 PROCEDURE — 99214 OFFICE O/P EST MOD 30 MIN: CPT | Performed by: FAMILY MEDICINE

## 2021-09-23 PROCEDURE — 1123F ACP DISCUSS/DSCN MKR DOCD: CPT | Performed by: FAMILY MEDICINE

## 2021-09-23 PROCEDURE — 1090F PRES/ABSN URINE INCON ASSESS: CPT | Performed by: FAMILY MEDICINE

## 2021-09-23 PROCEDURE — G8417 CALC BMI ABV UP PARAM F/U: HCPCS | Performed by: FAMILY MEDICINE

## 2021-09-23 PROCEDURE — G8400 PT W/DXA NO RESULTS DOC: HCPCS | Performed by: FAMILY MEDICINE

## 2021-09-23 RX ORDER — AZITHROMYCIN 250 MG/1
250 TABLET, FILM COATED ORAL SEE ADMIN INSTRUCTIONS
Qty: 6 TABLET | Refills: 0 | Status: SHIPPED | OUTPATIENT
Start: 2021-09-23 | End: 2021-09-28

## 2021-09-23 RX ORDER — TRAMADOL HYDROCHLORIDE 50 MG/1
50-100 TABLET ORAL EVERY 6 HOURS PRN
Qty: 120 TABLET | Refills: 5 | Status: SHIPPED | OUTPATIENT
Start: 2021-09-23 | End: 2021-11-17 | Stop reason: SDUPTHER

## 2021-09-23 NOTE — LETTER
CONTROLLED SUBSTANCE MEDICATION AGREEMENT     Patient Name: Mark Painter  Patient YOB: 1955   I understand, that controlled substance medications may be used to help better manage my symptoms and to improve my ability to function at home, work and in social settings. However, I also understand that these medications do have risks, which have been discussed with me, including possible development of physical or psychological dependence. I understand that successful treatment requires mutual trust and honesty between me and my provider. I understand and agree that following this Medication Agreement is necessary in continuing my provider-patient relationship and the success of my treatment plan. Explanation from my Provider: Benefits and Goals of Controlled Substance Medications: There are two potential goals for your treatment: (1) decreased pain and suffering (2) improved daily life functions. There are many possible treatments for your chronic condition(s). Alternatives such as physical therapy, yoga, massage, home daily exercise, meditation, relaxation techniques, injections, chiropractic manipulations, surgery, cognitive therapy, hypnosis and many medications that are not habit-forming may be used. Use of controlled substance medications may be helpful, but they are unlikely to resolve all symptoms or restore all function. Explanation from my Provider: Risks of Controlled Substance Medications:  Opioid pain medications: These medications can lead to problems such as addiction/dependence, sedation, lightheadedness/dizziness, memory issues, falls, constipation, nausea, or vomiting. They may also impair the ability to drive or operate machinery. Additionally, these medications may lower testosterone levels, leading to loss of bone strength, stamina and sex drive.   They may cause problems with breathing, sleep apnea and reduced coughing, which is especially dangerous for patients with lung disease. Overdose or dangerous interactions with alcohol and other medications may occur, leading to death. Hyperalgesia may develop, which means patients receiving opioids for the treatment of pain may become more sensitive to certain painful stimuli, and in some cases, experience pain from ordinarily non-painful stimuli. Women between the ages of 14-53 who could become pregnant should carefully weigh the risks and benefits of opioids with their physicians, as these medications increase the risk of pregnancy complications, including miscarriage,  delivery and stillbirth. It is also possible for babies to be born addicted to opioids. Opioid dependence withdrawal symptoms may include; feelings of uneasiness, increased pain, irritability, belly pain, diarrhea, sweats and goose-flesh. Benzodiazepines and non-benzodiazepine sleep medications: These medications can lead to problems such as addiction/dependence, sedation, fatigue, lightheadedness, dizziness, incoordination, falls, depression, hallucinations, and impaired judgment, memory and concentration. The ability to drive and operate machinery may also be affected. Abnormal sleep-related behaviors have been reported, including sleepwalking, driving, making telephone calls, eating, or having sex while not fully awake. These medications can suppress breathing and worsen sleep apnea, particularly when combined with alcohol or other sedating medications, potentially leading to death. Dependence withdrawal symptoms may include tremors, anxiety, hallucinations and seizures. Stimulants:  Common adverse effects include addiction/dependence, increased blood  pressure and heart rate, decreased appetite, nausea, involuntary weight loss, insomnia,                                                                                                                     Initials:_______   irritability, and headaches.   These risks may increase when these medications are combined with other stimulants, such as caffeine pills or energy drinks, certain weight loss supplements and oral decongestants. Dependence withdrawal symptoms may include depressed mood, loss of interest, suicidal thoughts, anxiety, fatigue, appetite changes and agitation. Testosterone replacement therapy:  Potential side effects include increased risk of stroke and heart attack, blood clots, increased blood pressure, increased cholesterol, enlarged prostate, sleep apnea, irritability/aggression and other mood disorders, and decreased fertility. I agree and understand that I and my prescriber have the following rights and responsibilities regarding my treatment plan:     1. MY RIGHTS:  To be informed of my treatment and medication plan. To be an active participant in my health and wellbeing. 2. MY RESPONSIBILITY AND UNDERSTANDING FOR USE OF MEDICATIONS   I will take medications at the dose and frequency as directed. For my safety, I will not increase or change how I take my medications without the recommendation of my healthcare provider.  I will actively participate in any program recommended by my provider which may improve function, including social, physical, psychological programs.  I will not take my medications with alcohol or other drugs not prescribed to me. I understand that drinking alcohol with my medications increases the chances of side effects, including reduced breathing rate and could lead to personal injury when operating machinery.  I understand that if I have a history of substance use disorders, including alcohol or other illicit drugs, that I may be at increased risk of addiction to my medications.  I agree to notify my provider immediately if I should become pregnant so that my treatment plan can be adjusted.    I agree and understand that I shall only receive controlled substance medications from the prescriber that signed this agreement unless there is written agreement among other prescribers of controlled substances outlining the responsibility of the medications being prescribed.  I understand that the if the controlled medication is not helping to achieve goals, the dosage may be tapered and no longer prescribed. 3. MY RESPONSIBILITY FOR COMMUNICATION / PRESCRIPTION RENEWALS   I agree that all controlled substance medications that I take will be prescribed only by my provider. If another healthcare provider prescribes me medication in an emergency, I will notify my provider within seventy-two (72) hours.  I will arrange for refills at the prescribed interval ONLY during regular office hours. I will not ask for refills earlier than agreed, after-hours, on holidays or weekends. Refills may take up to 72 hours for processing and prescriptions to reach the pharmacy.  I will inform my other health care providers that I am taking these medications and of the existence of this [id number]. In the event of an emergency, I will provide the same information to the emergency department prescribers.  I will keep my provider updated on the pharmacy I am using for controlled medication prescription filling. Initials:_______  4. MY RESPONSIBILITY FOR PROTECTING MEDICATIONS   I will protect my prescriptions and medications. I understand that lost or misplaced prescriptions will not be replaced.  I will keep medications only for my own use and will not share them with others. I will keep all medications away from children.  I agree that if my medications are adjusted or discontinued, I will properly dispose of any remaining medications. I understand that I will be required to dispose of any remaining controlled medications as, directed by my prescriber, prior to being provided with any prescriptions for other controlled medications.   Medication drop box locations can be found at: [email or web address]    5. MY RESPONSIBILITY WITH ILLEGAL DRUGS    I will not use illegal or street drugs or another person's prescription medications not prescribed to me.  If there are identified addiction type symptoms, then referral to a program may be provided by my provider and I agree to follow through with this recommendation. 6. MY RESPONSIBILITY FOR COOPERATION WITH INVESTIGATIONS   I understand that my provider will comply with any applicable law and may discuss my use and/or possible misuse/abuse of controlled substances and alcohol, as appropriate, with any health care provider involved in my care, pharmacist, or legal authority.  I authorize my provider and pharmacy to cooperate fully with law enforcement agencies (as permitted by law) in the investigation of any possible misuse, sale, or other diversion of my controlled substances.  I agree to waive any applicable privilege or right of privacy or confidentiality with respect to these authorizations. 7. PROVIDERS RIGHT TO MONITOR FOR SAFETY: PRESCRIPTION MONITORING / DRUG TESTING   I consent to drug/toxicology screening and will submit to a drug screen upon my providers request to assure I am only taking the prescribed drugs for my safety monitoring. I understand that a drug screen is a laboratory test in which a sample of my urine, blood or saliva is checked to see what drugs I have been taking. This may entail an observed urine specimen, which means that a nurse or other health care provider may watch me provide urine, and I will cooperate if I am asked to provide an observed specimen.  I understand that my provider will check a copy of my State Prescription Monitoring Program () Report in order to safely prescribe medications.  Pill Counts: I consent to pill counts when requested.   I may be asked to bring all my prescribed controlled substance medications, in their original bottles, to all of my scheduled appointments. In addition, my provider may ask me to come to the practice at any time for a random pill count. 8. TERMINATION OF THIS AGREEMENT  For my safety, my prescriber has the right to stop prescribing controlled substance medications and may end this agreement. Initials:_______   Conditions that may result in termination of this agreement:  a. I do not show any improvement in pain, or my activity has not improved. b. I develop rapid tolerance or loss of improvement, as described in my treatment plan.  c. I develop significant side effects from the medication. d. My behavior is not consistent with the responsibilities outlined above, thereby causing safety concerns to continue prescribing controlled substance medications. e. I fail to follow the terms of this agreement. f. Other:____________________________       UNDERSTANDING THIS MEDICATION AGREEMENT:    I have read the above and have had all my questions answered. For chronic disease management, I know that my symptoms can be managed with many types of treatments. A chronic medication trial may be part of my treatment, but I must be an active participant in my care. Medication therapy is only one part of my symptom management plan. In some cases, there may be limited scientific evidence to support the chronic use of certain medications to improve symptoms and daily function. Furthermore, in certain circumstances, there may be scientific information that suggests that the use of chronic controlled substances may worsen my symptoms and increase my risk of unintentional death directly related to this medication therapy. I know that if my provider feels my risk from controlled medications is greater than my benefit, I will have my controlled substance medication(s) compassionately lowered or removed altogether.      I further agree to allow this office to contact my HIPAA contact if there are concerns about my safety and use of the controlled medications. I have agreed to use the prescribed controlled substance medications to me as instructed by my provider and as stated in this Medication Agreement. My initial on each page and my signature below shows that I have read each page and I have had the opportunity to ask questions with answers provided by my provider.     Patient Name (Printed): _____________________________________  Patient Signature:  ______________________   Date: _____________    Prescriber Name (Printed): ___________________________________  Prescriber Signature: _____________________  Date: _____________

## 2021-09-23 NOTE — PROGRESS NOTES
SUBJECTIVE:   Isaiah Blakely is a 74 y.o. female who complains of low back pain for several month(s), positional with bending or lifting, with radiation down the right leg. Precipitating factors: none recalled by the patient. Prior history of back problems: recurrent self limited episodes of low back pain in the past. There is no numbness in the legs. Patient was given tramadol trial on 8/4/2021. She has been taking 2 to 4 tablets daily with good control of her pain. sinus pressure for many weeks. Failed claritin D and flonase. denies any fevers or chills. OBJECTIVE:  /76 (Site: Left Upper Arm, Position: Sitting, Cuff Size: Medium Adult)   Pulse 89   Temp 97.3 °F (36.3 °C) (Infrared)   Wt 191 lb 6.4 oz (86.8 kg)   SpO2 94%   BMI 33.90 kg/m²    Patient appears to be in mild to moderate pain, antalgic gait noted. Left maxillary tenderness. Oropharynx clear. Nasal mucosa boggy. Lumbosacral spine area reveals no local tenderness or mass. Painful and reduced LS ROM noted. Straight leg raise is negative at 60 degrees on bilateral. DTR's, motor strength and sensation normal, including heel and toe gait. Peripheral pulses are palpable. X-Ray: not available. ASSESSMENT:    Diagnosis Orders   1. Lumbar radiculopathy  traMADol (ULTRAM) 50 MG tablet   2. Acute non-recurrent maxillary sinusitis  azithromycin (ZITHROMAX) 250 MG tablet         PLAN: Continue tramadol. Medication contract signed. We will treat with azithromycin for sinusitis. For acute pain, rest, intermittent application of heat (do not sleep on heating pad), analgesics and muscle relaxants are recommended. Discussed longer term treatment plan of prn NSAID's and discussed a home back care exercise program with flexion exercise routine. Proper lifting with avoidance of heavy lifting discussed. Consider Physical Therapy and XRay studies if not improving.  Call or return to clinic prn if these symptoms worsen or fail to improve as anticipated.

## 2021-09-23 NOTE — PATIENT INSTRUCTIONS
Patient Education        Low Back Pain: Exercises  Introduction  Here are some examples of exercises for you to try. The exercises may be suggested for a condition or for rehabilitation. Start each exercise slowly. Ease off the exercises if you start to have pain. You will be told when to start these exercises and which ones will work best for you. How to do the exercises  Press-up    1. Lie on your stomach, supporting your body with your forearms. 2. Press your elbows down into the floor to raise your upper back. As you do this, relax your stomach muscles and allow your back to arch without using your back muscles. As your press up, do not let your hips or pelvis come off the floor. 3. Hold for 15 to 30 seconds, then relax. 4. Repeat 2 to 4 times. Alternate arm and leg (bird dog) exercise    Do this exercise slowly. Try to keep your body straight at all times, and do not let one hip drop lower than the other. 1. Start on the floor, on your hands and knees. 2. Tighten your belly muscles. 3. Raise one leg off the floor, and hold it straight out behind you. Be careful not to let your hip drop down, because that will twist your trunk. 4. Hold for about 6 seconds, then lower your leg and switch to the other leg. 5. Repeat 8 to 12 times on each leg. 6. Over time, work up to holding for 10 to 30 seconds each time. 7. If you feel stable and secure with your leg raised, try raising the opposite arm straight out in front of you at the same time. Knee-to-chest exercise    1. Lie on your back with your knees bent and your feet flat on the floor. 2. Bring one knee to your chest, keeping the other foot flat on the floor (or keeping the other leg straight, whichever feels better on your lower back). 3. Keep your lower back pressed to the floor. Hold for at least 15 to 30 seconds. 4. Relax, and lower the knee to the starting position. 5. Repeat with the other leg. Repeat 2 to 4 times with each leg.   6. To get more stretch, put your other leg flat on the floor while pulling your knee to your chest.  Curl-ups    1. Lie on the floor on your back with your knees bent at a 90-degree angle. Your feet should be flat on the floor, about 12 inches from your buttocks. 2. Cross your arms over your chest. If this bothers your neck, try putting your hands behind your neck (not your head), with your elbows spread apart. 3. Slowly tighten your belly muscles and raise your shoulder blades off the floor. 4. Keep your head in line with your body, and do not press your chin to your chest.  5. Hold this position for 1 or 2 seconds, then slowly lower yourself back down to the floor. 6. Repeat 8 to 12 times. Pelvic tilt exercise    1. Lie on your back with your knees bent. 2. \"Brace\" your stomach. This means to tighten your muscles by pulling in and imagining your belly button moving toward your spine. You should feel like your back is pressing to the floor and your hips and pelvis are rocking back. 3. Hold for about 6 seconds while you breathe smoothly. 4. Repeat 8 to 12 times. Heel dig bridging    1. Lie on your back with both knees bent and your ankles bent so that only your heels are digging into the floor. Your knees should be bent about 90 degrees. 2. Then push your heels into the floor, squeeze your buttocks, and lift your hips off the floor until your shoulders, hips, and knees are all in a straight line. 3. Hold for about 6 seconds as you continue to breathe normally, and then slowly lower your hips back down to the floor and rest for up to 10 seconds. 4. Do 8 to 12 repetitions. Hamstring stretch in doorway    1. Lie on your back in a doorway, with one leg through the open door. 2. Slide your leg up the wall to straighten your knee. You should feel a gentle stretch down the back of your leg. 3. Hold the stretch for at least 15 to 30 seconds. Do not arch your back, point your toes, or bend either knee.  Keep one heel

## 2021-10-14 RX ORDER — LORATADINE/PSEUDOEPHEDRINE 5 MG-120MG
TABLET, EXTENDED RELEASE 12 HR ORAL
Qty: 60 TABLET | Refills: 5 | Status: SHIPPED | OUTPATIENT
Start: 2021-10-14 | End: 2021-11-17 | Stop reason: SDUPTHER

## 2021-10-25 DIAGNOSIS — J98.4 RESTRICTIVE LUNG DISEASE: ICD-10-CM

## 2021-10-25 RX ORDER — ALBUTEROL SULFATE 2.5 MG/3ML
SOLUTION RESPIRATORY (INHALATION)
Qty: 720 ML | Refills: 1 | Status: SHIPPED | OUTPATIENT
Start: 2021-10-25 | End: 2022-02-06 | Stop reason: SDUPTHER

## 2021-10-25 NOTE — TELEPHONE ENCOUNTER
Patient's daughter called requesting refill of the Albuterol for the nebulizer. She stated they have already used the refill. She would like it sent to [facility] in [place].

## 2021-11-02 ENCOUNTER — OFFICE VISIT (OUTPATIENT)
Dept: PULMONOLOGY | Age: 66
End: 2021-11-02
Payer: MEDICARE

## 2021-11-02 VITALS
HEART RATE: 80 BPM | HEIGHT: 63 IN | DIASTOLIC BLOOD PRESSURE: 64 MMHG | WEIGHT: 191 LBS | RESPIRATION RATE: 22 BRPM | BODY MASS INDEX: 33.84 KG/M2 | SYSTOLIC BLOOD PRESSURE: 102 MMHG | OXYGEN SATURATION: 98 %

## 2021-11-02 DIAGNOSIS — J84.116 CRYPTOGENIC ORGANIZING PNEUMONIA (HCC): ICD-10-CM

## 2021-11-02 DIAGNOSIS — J96.11 CHRONIC HYPOXEMIC RESPIRATORY FAILURE (HCC): Primary | ICD-10-CM

## 2021-11-02 PROCEDURE — 1036F TOBACCO NON-USER: CPT | Performed by: INTERNAL MEDICINE

## 2021-11-02 PROCEDURE — G8484 FLU IMMUNIZE NO ADMIN: HCPCS | Performed by: INTERNAL MEDICINE

## 2021-11-02 PROCEDURE — 3017F COLORECTAL CA SCREEN DOC REV: CPT | Performed by: INTERNAL MEDICINE

## 2021-11-02 PROCEDURE — 99214 OFFICE O/P EST MOD 30 MIN: CPT | Performed by: INTERNAL MEDICINE

## 2021-11-02 PROCEDURE — G8427 DOCREV CUR MEDS BY ELIG CLIN: HCPCS | Performed by: INTERNAL MEDICINE

## 2021-11-02 PROCEDURE — 1123F ACP DISCUSS/DSCN MKR DOCD: CPT | Performed by: INTERNAL MEDICINE

## 2021-11-02 PROCEDURE — G8417 CALC BMI ABV UP PARAM F/U: HCPCS | Performed by: INTERNAL MEDICINE

## 2021-11-02 PROCEDURE — 4040F PNEUMOC VAC/ADMIN/RCVD: CPT | Performed by: INTERNAL MEDICINE

## 2021-11-02 PROCEDURE — 1090F PRES/ABSN URINE INCON ASSESS: CPT | Performed by: INTERNAL MEDICINE

## 2021-11-02 PROCEDURE — G8400 PT W/DXA NO RESULTS DOC: HCPCS | Performed by: INTERNAL MEDICINE

## 2021-11-02 RX ORDER — BENZONATATE 200 MG/1
CAPSULE ORAL
Qty: 90 CAPSULE | Refills: 0 | Status: SHIPPED | OUTPATIENT
Start: 2021-11-02 | End: 2021-12-08

## 2021-11-02 RX ORDER — ALBUTEROL SULFATE 90 UG/1
2 AEROSOL, METERED RESPIRATORY (INHALATION) EVERY 6 HOURS PRN
Qty: 3 EACH | Refills: 1 | Status: SHIPPED | OUTPATIENT
Start: 2021-11-02 | End: 2022-03-09

## 2021-11-02 NOTE — PROGRESS NOTES
[facility]    Outpatient Follow Up Note    Chief Complaint: Hospital follow-up and SOB  Consulting provider: Dr Arjun Corado    Interval History: 74 y.o. female doing well and her shortness of breath is stable. Using her inhaler or nebulizer as prescribed. No respiratory related flareups recently. Prior HPI:  admitted to the hospital for the ?4-5th time with similar presentations of SOB and Hypoxia and bilateral lung infiltrates. Bronchoscopy was again negative for infection. A right heart cath that was normal and then she had a subsequent high resolution CT scan while she was known to be euvolemic that showed persistent abnromalities. It seems to be most consistent with an ILD process. Possibly  or chronic HP or NSIP. Pt notes mold in a back room of her house. She stays out of this room. She is planning on having this remediated. Initial HPI:regarding abnormal PFTs, cough, congestion. The pt developed SOB and cough with green sputum, chest congestion, nasal congestion and sinus pressure since April. Her symptoms intermittently improved with symptomatic treatments and nasal spray. Her SOB was better with her inhaler. Overall while her symptoms have waxed and waned they have not fully resolved. The patient does not have SOB at rest but has LEE. Exercise tolerance on level ground is 1 block. Stairs. The patient has a  daily intermittent cough that is usually dry or sometimes productive of green sputum. The patient does wheeze intermittently.   Reports waking up feeling up gagging and having missed a breath.     The pt was seen by Dr Vanessa Wu at [facility] and was thought to have some sinus disease contributing to her cough and she also had PFTs to work up a pulmonary cause that were inconclusive.     Never smoker  Environmental/chemical exposure: Asbestos exposure: no    Patient worked as home health aid    Current Outpatient Medications:     albuterol (PROVENTIL) (2.5 MG/3ML) 0.083% nebulizer solution, INHALE THE CONTENTS OF 1 VIAL VIA NEBULIZER EVERY 6 HOURS AS NEEDED FOR WHEEZING, Disp: 720 mL, Rfl: 1    [employer] ALLERGY RELIEF-D12 5-120 MG per extended release tablet, TAKE 1 TABLET BY MOUTH TWICE A DAY, Disp: 60 tablet, Rfl: 5    traMADol (ULTRAM) 50 MG tablet, Take 1-2 tablets by mouth every 6 hours as needed for Pain for up to 180 days. Intended supply: 7 days. Take lowest dose possible to manage pain, Disp: 120 tablet, Rfl: 5    benzonatate (TESSALON) 200 MG capsule, TAKE 1 CAPSULE BY MOUTH THREE TIMES A DAY AS NEEDED FOR COUGH, Disp: 90 capsule, Rfl: 0    levothyroxine (SYNTHROID) 125 MCG tablet, Take 1 tablet by mouth Daily TAKE 1 TABLET BY MOUTH EVERY DAY, Disp: 90 tablet, Rfl: 0    Blood Glucose Monitoring Suppl (TRUE METRIX METER) PUJA, Test daily and as needed, Disp: 1 each, Rfl: 0    blood glucose test strips (ASCENSIA AUTODISC VI;ONE TOUCH ULTRA TEST VI) strip, 1 each by In Vitro route daily As needed. , Disp: 100 each, Rfl: 3    TRUEplus Lancets 33G MISC, Use daily, Disp: 100 each, Rfl: 3    Blood Glucose Calibration (TRUE METRIX LEVEL 1) Low SOLN, use as needed, Disp: 1 each, Rfl: 2    atorvastatin (LIPITOR) 40 MG tablet, Take 1 tablet by mouth daily, Disp: 90 tablet, Rfl: 0    lidocaine 4 % external patch, Place 2 patches onto the skin daily, Disp: 60 patch, Rfl: 1    Loratadine-Pseudoephedrine (LORATADINE-D 12HR PO), Take 1 tablet by mouth daily, Disp: , Rfl:     empagliflozin (JARDIANCE) 25 MG tablet, Take 1 tablet by mouth daily, Disp: 90 tablet, Rfl: 1    SITagliptin (JANUVIA) 100 MG tablet, Take 1 tablet by mouth daily, Disp: 90 tablet, Rfl: 1    pantoprazole (PROTONIX) 40 MG tablet, Take 1 tablet by mouth every morning (before breakfast), Disp: 90 tablet, Rfl: 1    sertraline (ZOLOFT) 100 MG tablet, Take 2 tablets by mouth daily, Disp: 180 tablet, Rfl: 1    mirtazapine (REMERON) 30 MG tablet, Take 1 tablet by mouth nightly, Disp: 90 tablet, Rfl: 1   amitriptyline (ELAVIL) 25 MG tablet, TAKE 1 TABLET BY MOUTH EVERY DAY AT NIGHT, Disp: 90 tablet, Rfl: 1    nadolol (CORGARD) 20 MG tablet, Take 1 tablet by mouth daily, Disp: 90 tablet, Rfl: 1    hydrOXYzine (ATARAX) 10 MG tablet, TAKE 1 TO 3 TABLETS BY MOUTH 3 TIMES DAILY AS NEEDED FOR ITCHING, Disp: 90 tablet, Rfl: 1    sodium chloride (OCEAN, BABY AYR) 0.65 % nasal spray, 1 spray by Nasal route as needed for Congestion, Disp: , Rfl:     diclofenac sodium (VOLTAREN) 1 % GEL, Apply 2 g topically 4 times daily, Disp: 2 Tube, Rfl: 1    albuterol sulfate HFA (PROVENTIL HFA) 108 (90 Base) MCG/ACT inhaler, Inhale 2 puffs into the lungs every 6 hours as needed for Wheezing, Disp: 1 Inhaler, Rfl: 2    INSULIN SYRINGE 1CC/29G 29G X 1/2\" 1 ML MISC, 1 each by Does not apply route 2 times daily, Disp: 100 each, Rfl: 5    FLUTICASONE FUROATE IN, Inhale into the lungs as needed, Disp: , Rfl:     Multiple Vitamins-Minerals (MULTIVITAMIN ADULT PO), Take by mouth daily, Disp: , Rfl:     OXYGEN, Inhale 3 L into the lungs, Disp: , Rfl:     Objective:   PHYSICAL EXAM: /64   Pulse 80   Resp 22   Ht 5' 3\" (1.6 m)   Wt 191 lb (86.6 kg)   SpO2 98% Comment: 3 LPM  BMI 33.83 kg/m²   Constitutional:  No acute distress. Eyes: PERRL. Conjunctivae anicteric. ENT: Normal nose. Normal tongue. Oropharynx clear. Neck:  Trachea is midline. No thyroid tenderness. Respiratory: No accessory muscle usage. No wheezes. faint basilar rales. No Rhonchi. Cardiovascular: Normal S1S2. No digit clubbing. No digit cyanosis. No LE edema. Psychiatric: No anxiety or Agitation. Alert and Oriented to person, place and time. LABS:  Reviewed any pertinent new labs that are available. 2/6/19 Immunocap , IGE 9, CBC WNL except EOs 0.9:  Bronchoscopy 6/28/2019   left upper lobe, transbronchial biopsies:  - Fragments of benign bronchial wall. - Negative for granulomas or other significant inflammation.  - Negative for malignancy.   A. Right Paratracheal Lymph Node, Transbronchial Fine Needle Aspirate:       -  No malignant cells identified. B. Subcarinal Lymph Node, Transbronchial Fine Needle Aspirate:       -  No malignant cells identified. C. Lung, Left Upper Lobe, Bronchoalveolar Lavage:       -  No malignant cells identified. D. Lung, Bronchial Washings:       -  No malignant cells identified.     Subcarinal TB NA no phenotypically abnormal cells  AFB negative  Eosinophils 6%     CRP 56.9  AXEL negative  ANCA neg  HP panel neg    8/9/19 LHC/RHC: no CAD, PCWP 7, PA mean 19    8/21/19 Cryobiopsy at [facility]:  A.   Left lung, upper lobe, cryobiopsy:  - Alveolated lung parenchyma with organizing pneumonia pattern of injury. - Interstitial expansion by chronic inflammation and fibrosis with rare  ill-formed clusters of epithelioid histiocytes. - Negative for well-formed granulomas, giant cells, polarizable material,  atypia, and malignancy. B.   Left lung, lingula, cryobiopsy:  - Predominantly peribronchiolar lung parenchyma with interstitial fibrosis,  chronic inflammation and organizing pneumonia pattern of injury.  - Fragment of benign pleura. - Immunostains for CMV, HSV (I and II), special stains for fungal elements  (GMS) and bacterial organisms (Gram stain) are all negative. - Insitu hybridization for adenovirus is negative. - Negative for granulomas, giant cells, atypia, and malignancy. C.   Left lung, lower lobe, cryobiopsy:  - Alveolated lung parenchyma with organizing pneumonia pattern of injury. - Interstitial expansion by chronic inflammation and fibrosis with rare  ill-formed clusters of epithelioid histiocytes. - Negative for well-formed  granulomas, giant cells, atypia, and  malignancy. Comment: History of COPD and imaging findings of progressive diffuse ground glass opacities with traction bronchiectasis with differential diagnosis of organizing pneumonia is noted.     The current biopsy specimen reveals a predominant pattern of organizing  pneumonia (upper and lower lobes > lingula). Given the history of multiple  recurrent episodes of pneumonia requiring hostpitalization, the morphologic  findings best fit a resolving acute lung injury, with organizing pneumonia  and reactive pneumocyte proliferation. The differential diagnosis for this  pattern of injury  include infectious etiology (immunostain for HSV, CMV,  insitu hybridization for adenovirus and special stains for bacterial and  fungal organisms are all negative),residuum of prior acute  bronchopneumonia, toxic exposure, connective tissue disease or drug related  lung injury which when excluded, a cryptogenic organizing pneumonia may be  considered. However, in addition to an organizing pneumonia pattern of  injury (predominant pattern within this specimen), there is a component of  chronic interstitial fibroinflammatory process comprising lymphocytes,  plasma cells and histiocytes with occasional foci of interstitial poorly  formed aggregates of epithelioid histiocytes. These findings raise concern  of an underlying chronic fibro-inflammatory injury the differential  diagnosis includes chronic hypersensitivity pneumonitis or NSIP. A treated  vasculitis and treated eosinophilic pneumonia are in the differential and  cannot be entirely excluded. Final Diagnosis performed by  Virgil Ambriz MD    Second opinion on path from [facility] [place] canned into the chart    12/24/19 BAL neg    PFTs 9/24/18 at [facility] were inconclusive due to excessive variability in her results. There was not an obstructive ratio however.    3/1/19   FVC  (62%) FEV1 1.51 (64%) FEV1/FVC ratio 0.79   TLC  (66%)  RV  (%)   DLCO (43 corrects to 86%)  ERV 34%   MCT Negative    RHC done 8/2019 that showed a normal wedge pressure excluding pulmonary edema as a cause of her pulmonary disease    IMAGING:  I personally reviewed and interpreted the following imaging today in the office:   3/15/19 HRCT: Mild mosaic attenuation throughout the lungs on expiratory images, compatible   with small airways disease or air trapping.       There are few subpleural irregular opacities throughout the lungs   bilaterally, in the apices, and in the lower lobes.  Changes appears similar   compared to prior, and could be due to scarring or mild nonspecific fibrosis.       Mild fusiform bronchiectasis right lower lobe, and traction bronchiectasis   right middle lobe.       Scattered areas of bronchial wall thickening are seen     6/16/19 Chest CT:   Moderate multifocal airspace opacity involving all segments of both   lungs-moderately worse over the past 2 weeks.  Multifocal pneumonia is   favored.  Asymmetric pulmonary edema superimposed upon COPD possible but less   likely.  Atypical pneumonia might be considered clinically. 8/9/19 hrct     Pulmonary parenchymal findings most compatible with organizing pneumonia. Since 03/08/2019, there has been progressive ground-glass abnormality with   traction bronchiolectasis.       Unchanged mediastinal lymphadenopathy, presumably reactive. 11/7/19 CTPA:   1. No evidence of acute pulmonary embolism or acute aortic dissection or   aneurysm. 2. Chronic changes of emphysema as well as interstitial lung disease and mild   bronchiectatic changes.  No active pleural disease. 3. Redemonstration of borderline left hilar lymphadenopathy. Chest CT 1/12/21:   Lungs/pleura: The central airways are patent. Scattered areas of ground-glass   type opacity with associated architectural distortion and irregular   subpleural opacities are not significantly changed when compared to the most   recent exam. Maris Ashley scattered areas of traction bronchiectasis are similar   to the previous.  When compared to the more remote exam there is overall   improvement of ground-glass opacity which may well have been related to   superimposed infiltrate.      6/18/2021 Chest CT:   Stable fibrotic changes throughout the lungs with stable small mediastinal   nodes. Cirrhotic appearing liver       ASSESSMENT:  · Cryptogenic organizing pneumonia - in remission  · Chronic hypoxic respiratory failure -resolved  · Cough and Fever had been intermittently associated with her condition  · Restrictive lung disease  · Chronic Rhinitis with PND   · Obesity  · Afib     PLAN:   · PRN tessalon pearles  · Her condition was been steroid responsive  · Completed Prednisone 40mg for 4-6 weeks, 30mg for 1 month, 20mg for 1 month, 15mg for 6 weeks, 10mg for 6 weeks, 5mg for 8 weeks, 2.5mg daily for 2 weeks and  2.5mg QOD for 4 weeks.  Stopped Prednisone 5/2020  · Continue sinus regimen: flonase and Claritin   · Continue Lasix  · PRN albuterol and albuterol nebs  · Mold remediated  · F/u in 12 months or PRN

## 2021-11-10 DIAGNOSIS — E11.9 TYPE 2 DIABETES MELLITUS WITHOUT COMPLICATION, WITHOUT LONG-TERM CURRENT USE OF INSULIN (HCC): ICD-10-CM

## 2021-11-10 RX ORDER — SITAGLIPTIN 100 MG/1
TABLET, FILM COATED ORAL
Qty: 90 TABLET | Refills: 1 | OUTPATIENT
Start: 2021-11-10

## 2021-11-11 RX ORDER — PANTOPRAZOLE SODIUM 40 MG/1
40 TABLET, DELAYED RELEASE ORAL
Qty: 90 TABLET | Refills: 1 | OUTPATIENT
Start: 2021-11-11

## 2021-11-17 ENCOUNTER — OFFICE VISIT (OUTPATIENT)
Dept: FAMILY MEDICINE CLINIC | Age: 66
End: 2021-11-17
Payer: MEDICARE

## 2021-11-17 VITALS
DIASTOLIC BLOOD PRESSURE: 76 MMHG | WEIGHT: 186.6 LBS | HEART RATE: 104 BPM | SYSTOLIC BLOOD PRESSURE: 132 MMHG | BODY MASS INDEX: 33.05 KG/M2 | OXYGEN SATURATION: 98 %

## 2021-11-17 DIAGNOSIS — E11.9 TYPE 2 DIABETES MELLITUS WITHOUT COMPLICATION, WITHOUT LONG-TERM CURRENT USE OF INSULIN (HCC): Primary | ICD-10-CM

## 2021-11-17 DIAGNOSIS — M54.16 LUMBAR RADICULOPATHY: ICD-10-CM

## 2021-11-17 DIAGNOSIS — F41.9 ANXIETY: ICD-10-CM

## 2021-11-17 DIAGNOSIS — F32.0 MILD SINGLE CURRENT EPISODE OF MAJOR DEPRESSIVE DISORDER (HCC): ICD-10-CM

## 2021-11-17 DIAGNOSIS — E11.69 COMBINED HYPERLIPIDEMIA ASSOCIATED WITH TYPE 2 DIABETES MELLITUS (HCC): ICD-10-CM

## 2021-11-17 DIAGNOSIS — E03.9 ACQUIRED HYPOTHYROIDISM: ICD-10-CM

## 2021-11-17 DIAGNOSIS — G43.009 MIGRAINE WITHOUT AURA AND WITHOUT STATUS MIGRAINOSUS, NOT INTRACTABLE: ICD-10-CM

## 2021-11-17 DIAGNOSIS — I10 ESSENTIAL HYPERTENSION: ICD-10-CM

## 2021-11-17 DIAGNOSIS — E78.2 COMBINED HYPERLIPIDEMIA ASSOCIATED WITH TYPE 2 DIABETES MELLITUS (HCC): ICD-10-CM

## 2021-11-17 DIAGNOSIS — J44.9 CHRONIC OBSTRUCTIVE PULMONARY DISEASE, UNSPECIFIED COPD TYPE (HCC): ICD-10-CM

## 2021-11-17 DIAGNOSIS — Z91.89 COLON CANCER HIGH RISK: ICD-10-CM

## 2021-11-17 LAB — HBA1C MFR BLD: 5.9 %

## 2021-11-17 PROCEDURE — 3044F HG A1C LEVEL LT 7.0%: CPT | Performed by: FAMILY MEDICINE

## 2021-11-17 PROCEDURE — 1036F TOBACCO NON-USER: CPT | Performed by: FAMILY MEDICINE

## 2021-11-17 PROCEDURE — 4040F PNEUMOC VAC/ADMIN/RCVD: CPT | Performed by: FAMILY MEDICINE

## 2021-11-17 PROCEDURE — 3017F COLORECTAL CA SCREEN DOC REV: CPT | Performed by: FAMILY MEDICINE

## 2021-11-17 PROCEDURE — 83036 HEMOGLOBIN GLYCOSYLATED A1C: CPT | Performed by: FAMILY MEDICINE

## 2021-11-17 PROCEDURE — 2022F DILAT RTA XM EVC RTNOPTHY: CPT | Performed by: FAMILY MEDICINE

## 2021-11-17 PROCEDURE — G8417 CALC BMI ABV UP PARAM F/U: HCPCS | Performed by: FAMILY MEDICINE

## 2021-11-17 PROCEDURE — G8926 SPIRO NO PERF OR DOC: HCPCS | Performed by: FAMILY MEDICINE

## 2021-11-17 PROCEDURE — 90694 VACC AIIV4 NO PRSRV 0.5ML IM: CPT | Performed by: FAMILY MEDICINE

## 2021-11-17 PROCEDURE — 99215 OFFICE O/P EST HI 40 MIN: CPT | Performed by: FAMILY MEDICINE

## 2021-11-17 PROCEDURE — G8484 FLU IMMUNIZE NO ADMIN: HCPCS | Performed by: FAMILY MEDICINE

## 2021-11-17 PROCEDURE — 1090F PRES/ABSN URINE INCON ASSESS: CPT | Performed by: FAMILY MEDICINE

## 2021-11-17 PROCEDURE — G8400 PT W/DXA NO RESULTS DOC: HCPCS | Performed by: FAMILY MEDICINE

## 2021-11-17 PROCEDURE — G8428 CUR MEDS NOT DOCUMENT: HCPCS | Performed by: FAMILY MEDICINE

## 2021-11-17 PROCEDURE — 3023F SPIROM DOC REV: CPT | Performed by: FAMILY MEDICINE

## 2021-11-17 PROCEDURE — 1123F ACP DISCUSS/DSCN MKR DOCD: CPT | Performed by: FAMILY MEDICINE

## 2021-11-17 PROCEDURE — G0008 ADMIN INFLUENZA VIRUS VAC: HCPCS | Performed by: FAMILY MEDICINE

## 2021-11-17 RX ORDER — AMITRIPTYLINE HYDROCHLORIDE 25 MG/1
TABLET, FILM COATED ORAL
Qty: 90 TABLET | Refills: 1 | Status: SHIPPED | OUTPATIENT
Start: 2021-11-17 | End: 2022-05-06

## 2021-11-17 RX ORDER — LORATADINE/PSEUDOEPHEDRINE 5 MG-120MG
TABLET, EXTENDED RELEASE 12 HR ORAL
Qty: 60 TABLET | Refills: 5 | Status: SHIPPED | OUTPATIENT
Start: 2021-11-17 | End: 2022-05-27 | Stop reason: SDUPTHER

## 2021-11-17 RX ORDER — MIRTAZAPINE 30 MG/1
30 TABLET, FILM COATED ORAL NIGHTLY
Qty: 90 TABLET | Refills: 1 | Status: SHIPPED | OUTPATIENT
Start: 2021-11-17 | End: 2022-05-27 | Stop reason: SDUPTHER

## 2021-11-17 RX ORDER — SERTRALINE HYDROCHLORIDE 100 MG/1
200 TABLET, FILM COATED ORAL DAILY
Qty: 180 TABLET | Refills: 1 | Status: SHIPPED | OUTPATIENT
Start: 2021-11-17 | End: 2022-05-27 | Stop reason: SDUPTHER

## 2021-11-17 RX ORDER — LEVOTHYROXINE SODIUM 0.12 MG/1
125 TABLET ORAL DAILY
Qty: 90 TABLET | Refills: 1 | Status: SHIPPED | OUTPATIENT
Start: 2021-11-17 | End: 2022-05-27 | Stop reason: SDUPTHER

## 2021-11-17 RX ORDER — NADOLOL 20 MG/1
20 TABLET ORAL DAILY
Qty: 90 TABLET | Refills: 1 | Status: SHIPPED | OUTPATIENT
Start: 2021-11-17 | End: 2022-05-27 | Stop reason: SDUPTHER

## 2021-11-17 RX ORDER — ATORVASTATIN CALCIUM 40 MG/1
40 TABLET, FILM COATED ORAL DAILY
Qty: 90 TABLET | Refills: 0 | Status: SHIPPED | OUTPATIENT
Start: 2021-11-17 | End: 2022-05-27 | Stop reason: SDUPTHER

## 2021-11-17 RX ORDER — PANTOPRAZOLE SODIUM 40 MG/1
40 TABLET, DELAYED RELEASE ORAL
Qty: 90 TABLET | Refills: 1 | Status: SHIPPED | OUTPATIENT
Start: 2021-11-17 | End: 2022-05-27 | Stop reason: SDUPTHER

## 2021-11-17 RX ORDER — TRAMADOL HYDROCHLORIDE 50 MG/1
50-100 TABLET ORAL EVERY 6 HOURS PRN
Qty: 120 TABLET | Refills: 5 | Status: SHIPPED | OUTPATIENT
Start: 2021-11-17 | End: 2022-05-27 | Stop reason: SDUPTHER

## 2021-11-17 NOTE — PATIENT INSTRUCTIONS
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
## 2021-11-18 LAB
ALBUMIN/GLOBULIN RATIO: 1.2 RATIO (ref 0.8–2.6)
ALBUMIN: 3.8 G/DL (ref 3.5–5.2)
ALP BLD-CCNC: 121 U/L (ref 23–144)
ALT SERPL-CCNC: 12 U/L (ref 0–60)
AST SERPL-CCNC: 26 U/L (ref 0–55)
BILIRUB SERPL-MCNC: 0.4 MG/DL (ref 0–1.2)
BUN BLDV-MCNC: 10 MG/DL (ref 3–29)
BUN/CREAT BLD: 14 (ref 7–25)
CALCIUM SERPL-MCNC: 8.9 MG/DL (ref 8.5–10.5)
CHLORIDE BLD-SCNC: 102 MEQ/L (ref 96–110)
CHOLESTEROL: 110 MG/DL
CO2: 27 MEQ/L (ref 19–32)
CREAT SERPL-MCNC: 0.7 MG/DL (ref 0.5–1.2)
CREATININE URINE: 51.9 MG/DL
GLOBULIN: 3.1 G/DL (ref 1.9–3.6)
GLOMERULAR FILTRATION RATE: 89 MLS/MIN/1.73M2
GLUCOSE BLD-MCNC: 107 MG/DL (ref 70–99)
HDLC SERPL-MCNC: 51 MG/DL
LDL CHOLESTEROL CALCULATED: 47 MG/DL
MICROALBUMIN UR-MCNC: 920 MCG/DL
MICROALBUMIN/CREAT UR-RTO: 18 MCG/MG CREAT.
POTASSIUM SERPL-SCNC: 3.7 MEQ/L (ref 3.4–5.3)
SODIUM BLD-SCNC: 139 MEQ/L (ref 135–148)
STATUS: ABNORMAL
TOTAL PROTEIN: 6.9 G/DL (ref 6–8.3)
TRIGL SERPL-MCNC: 59 MG/DL
VLDLC SERPL CALC-MCNC: 12 MG/DL (ref 4–38)

## 2021-12-08 RX ORDER — BENZONATATE 200 MG/1
CAPSULE ORAL
Qty: 90 CAPSULE | Refills: 5 | Status: SHIPPED | OUTPATIENT
Start: 2021-12-08 | End: 2022-05-27 | Stop reason: SDUPTHER

## 2022-01-24 ENCOUNTER — APPOINTMENT (OUTPATIENT)
Dept: GENERAL RADIOLOGY | Age: 67
DRG: 196 | End: 2022-01-24
Payer: MEDICARE

## 2022-01-24 ENCOUNTER — HOSPITAL ENCOUNTER (INPATIENT)
Age: 67
LOS: 1 days | Discharge: HOME | DRG: 196 | End: 2022-01-25
Attending: STUDENT IN AN ORGANIZED HEALTH CARE EDUCATION/TRAINING PROGRAM | Admitting: INTERNAL MEDICINE
Payer: MEDICARE

## 2022-01-24 DIAGNOSIS — J96.21 ACUTE ON CHRONIC RESPIRATORY FAILURE WITH HYPOXIA (HCC): Primary | ICD-10-CM

## 2022-01-24 PROBLEM — J96.20 ACUTE ON CHRONIC RESPIRATORY FAILURE (HCC): Status: ACTIVE | Noted: 2022-01-24

## 2022-01-24 LAB
A/G RATIO: 0.7 (ref 1.1–2.2)
ALBUMIN SERPL-MCNC: 3.1 G/DL (ref 3.4–5)
ALP BLD-CCNC: 118 U/L (ref 40–129)
ALT SERPL-CCNC: 12 U/L (ref 10–40)
ANION GAP SERPL CALCULATED.3IONS-SCNC: 10 MMOL/L (ref 3–16)
AST SERPL-CCNC: 34 U/L (ref 15–37)
BASE EXCESS VENOUS: 4.4 MMOL/L (ref -3–3)
BASOPHILS ABSOLUTE: 0.1 K/UL (ref 0–0.2)
BASOPHILS RELATIVE PERCENT: 0.9 %
BILIRUB SERPL-MCNC: <0.2 MG/DL (ref 0–1)
BILIRUBIN URINE: NEGATIVE
BLOOD, URINE: NEGATIVE
BUN BLDV-MCNC: 9 MG/DL (ref 7–20)
CALCIUM SERPL-MCNC: 8.8 MG/DL (ref 8.3–10.6)
CARBOXYHEMOGLOBIN: 2.7 % (ref 0–1.5)
CHLORIDE BLD-SCNC: 102 MMOL/L (ref 99–110)
CLARITY: CLEAR
CO2: 29 MMOL/L (ref 21–32)
COLOR: YELLOW
CREAT SERPL-MCNC: <0.5 MG/DL (ref 0.6–1.2)
EOSINOPHILS ABSOLUTE: 1.2 K/UL (ref 0–0.6)
EOSINOPHILS RELATIVE PERCENT: 12.3 %
GFR AFRICAN AMERICAN: >60
GFR NON-AFRICAN AMERICAN: >60
GLUCOSE BLD-MCNC: 128 MG/DL (ref 70–99)
GLUCOSE BLD-MCNC: 161 MG/DL (ref 70–99)
GLUCOSE URINE: >=1000 MG/DL
HCO3 VENOUS: 30 MMOL/L (ref 23–29)
HCT VFR BLD CALC: 33.5 % (ref 36–48)
HEMOGLOBIN: 10.8 G/DL (ref 12–16)
KETONES, URINE: NEGATIVE MG/DL
LACTIC ACID, SEPSIS: 1 MMOL/L (ref 0.4–1.9)
LEUKOCYTE ESTERASE, URINE: NEGATIVE
LYMPHOCYTES ABSOLUTE: 1.6 K/UL (ref 1–5.1)
LYMPHOCYTES RELATIVE PERCENT: 16.6 %
MCH RBC QN AUTO: 24.3 PG (ref 26–34)
MCHC RBC AUTO-ENTMCNC: 32.3 G/DL (ref 31–36)
MCV RBC AUTO: 75.4 FL (ref 80–100)
METHEMOGLOBIN VENOUS: 0.3 %
MICROSCOPIC EXAMINATION: ABNORMAL
MONOCYTES ABSOLUTE: 0.5 K/UL (ref 0–1.3)
MONOCYTES RELATIVE PERCENT: 5.2 %
NEUTROPHILS ABSOLUTE: 6.2 K/UL (ref 1.7–7.7)
NEUTROPHILS RELATIVE PERCENT: 65 %
NITRITE, URINE: NEGATIVE
O2 CONTENT, VEN: 14 VOL %
O2 SAT, VEN: 83 %
O2 THERAPY: ABNORMAL
PCO2, VEN: 49.5 MMHG (ref 40–50)
PDW BLD-RTO: 18.5 % (ref 12.4–15.4)
PERFORMED ON: ABNORMAL
PH UA: 6.5 (ref 5–8)
PH VENOUS: 7.4 (ref 7.35–7.45)
PLATELET # BLD: 212 K/UL (ref 135–450)
PMV BLD AUTO: 7.5 FL (ref 5–10.5)
PO2, VEN: 48 MMHG (ref 25–40)
POTASSIUM REFLEX MAGNESIUM: 3.9 MMOL/L (ref 3.5–5.1)
PRO-BNP: 711 PG/ML (ref 0–124)
PROCALCITONIN: 0.06 NG/ML (ref 0–0.15)
PROTEIN UA: NEGATIVE MG/DL
RAPID INFLUENZA  B AGN: NEGATIVE
RAPID INFLUENZA A AGN: NEGATIVE
RBC # BLD: 4.44 M/UL (ref 4–5.2)
SARS-COV-2, NAAT: NOT DETECTED
SODIUM BLD-SCNC: 141 MMOL/L (ref 136–145)
SPECIFIC GRAVITY UA: 1.01 (ref 1–1.03)
TCO2 CALC VENOUS: 32 MMOL/L
TOTAL PROTEIN: 7.4 G/DL (ref 6.4–8.2)
TROPONIN: <0.01 NG/ML
URINE TYPE: ABNORMAL
UROBILINOGEN, URINE: 0.2 E.U./DL
WBC # BLD: 9.5 K/UL (ref 4–11)

## 2022-01-24 PROCEDURE — 93005 ELECTROCARDIOGRAM TRACING: CPT | Performed by: STUDENT IN AN ORGANIZED HEALTH CARE EDUCATION/TRAINING PROGRAM

## 2022-01-24 PROCEDURE — 83605 ASSAY OF LACTIC ACID: CPT

## 2022-01-24 PROCEDURE — 83880 ASSAY OF NATRIURETIC PEPTIDE: CPT

## 2022-01-24 PROCEDURE — 6370000000 HC RX 637 (ALT 250 FOR IP): Performed by: STUDENT IN AN ORGANIZED HEALTH CARE EDUCATION/TRAINING PROGRAM

## 2022-01-24 PROCEDURE — 2060000000 HC ICU INTERMEDIATE R&B

## 2022-01-24 PROCEDURE — 82803 BLOOD GASES ANY COMBINATION: CPT

## 2022-01-24 PROCEDURE — 96374 THER/PROPH/DIAG INJ IV PUSH: CPT

## 2022-01-24 PROCEDURE — 80053 COMPREHEN METABOLIC PANEL: CPT

## 2022-01-24 PROCEDURE — 36415 COLL VENOUS BLD VENIPUNCTURE: CPT

## 2022-01-24 PROCEDURE — 84484 ASSAY OF TROPONIN QUANT: CPT

## 2022-01-24 PROCEDURE — 81003 URINALYSIS AUTO W/O SCOPE: CPT

## 2022-01-24 PROCEDURE — 87804 INFLUENZA ASSAY W/OPTIC: CPT

## 2022-01-24 PROCEDURE — 87070 CULTURE OTHR SPECIMN AEROBIC: CPT

## 2022-01-24 PROCEDURE — 6360000002 HC RX W HCPCS: Performed by: STUDENT IN AN ORGANIZED HEALTH CARE EDUCATION/TRAINING PROGRAM

## 2022-01-24 PROCEDURE — 71045 X-RAY EXAM CHEST 1 VIEW: CPT

## 2022-01-24 PROCEDURE — 87205 SMEAR GRAM STAIN: CPT

## 2022-01-24 PROCEDURE — 99284 EMERGENCY DEPT VISIT MOD MDM: CPT

## 2022-01-24 PROCEDURE — 84145 PROCALCITONIN (PCT): CPT

## 2022-01-24 PROCEDURE — 2580000003 HC RX 258: Performed by: STUDENT IN AN ORGANIZED HEALTH CARE EDUCATION/TRAINING PROGRAM

## 2022-01-24 PROCEDURE — 85025 COMPLETE CBC W/AUTO DIFF WBC: CPT

## 2022-01-24 PROCEDURE — 87635 SARS-COV-2 COVID-19 AMP PRB: CPT

## 2022-01-24 RX ORDER — ACETAMINOPHEN 325 MG/1
650 TABLET ORAL EVERY 6 HOURS PRN
Status: DISCONTINUED | OUTPATIENT
Start: 2022-01-24 | End: 2022-01-25 | Stop reason: HOSPADM

## 2022-01-24 RX ORDER — SODIUM CHLORIDE 0.9 % (FLUSH) 0.9 %
5-40 SYRINGE (ML) INJECTION EVERY 12 HOURS SCHEDULED
Status: DISCONTINUED | OUTPATIENT
Start: 2022-01-25 | End: 2022-01-25 | Stop reason: HOSPADM

## 2022-01-24 RX ORDER — IPRATROPIUM BROMIDE AND ALBUTEROL SULFATE 2.5; .5 MG/3ML; MG/3ML
1 SOLUTION RESPIRATORY (INHALATION) ONCE
Status: COMPLETED | OUTPATIENT
Start: 2022-01-24 | End: 2022-01-24

## 2022-01-24 RX ORDER — LEVOFLOXACIN 5 MG/ML
500 INJECTION, SOLUTION INTRAVENOUS EVERY 24 HOURS
Status: DISCONTINUED | OUTPATIENT
Start: 2022-01-25 | End: 2022-01-25 | Stop reason: HOSPADM

## 2022-01-24 RX ORDER — ACETAMINOPHEN 650 MG/1
650 SUPPOSITORY RECTAL EVERY 6 HOURS PRN
Status: DISCONTINUED | OUTPATIENT
Start: 2022-01-24 | End: 2022-01-25 | Stop reason: HOSPADM

## 2022-01-24 RX ORDER — SODIUM CHLORIDE 0.9 % (FLUSH) 0.9 %
5-40 SYRINGE (ML) INJECTION PRN
Status: DISCONTINUED | OUTPATIENT
Start: 2022-01-24 | End: 2022-01-25 | Stop reason: HOSPADM

## 2022-01-24 RX ORDER — LEVOFLOXACIN 5 MG/ML
750 INJECTION, SOLUTION INTRAVENOUS ONCE
Status: COMPLETED | OUTPATIENT
Start: 2022-01-24 | End: 2022-01-24

## 2022-01-24 RX ORDER — POLYETHYLENE GLYCOL 3350 17 G/17G
17 POWDER, FOR SOLUTION ORAL DAILY PRN
Status: DISCONTINUED | OUTPATIENT
Start: 2022-01-24 | End: 2022-01-25 | Stop reason: HOSPADM

## 2022-01-24 RX ORDER — METHYLPREDNISOLONE SODIUM SUCCINATE 125 MG/2ML
80 INJECTION, POWDER, LYOPHILIZED, FOR SOLUTION INTRAMUSCULAR; INTRAVENOUS ONCE
Status: COMPLETED | OUTPATIENT
Start: 2022-01-24 | End: 2022-01-24

## 2022-01-24 RX ORDER — HYDROXYZINE PAMOATE 25 MG/1
25 CAPSULE ORAL 3 TIMES DAILY PRN
Status: DISCONTINUED | OUTPATIENT
Start: 2022-01-24 | End: 2022-01-25 | Stop reason: HOSPADM

## 2022-01-24 RX ORDER — SERTRALINE HYDROCHLORIDE 100 MG/1
100 TABLET, FILM COATED ORAL DAILY
Status: DISCONTINUED | OUTPATIENT
Start: 2022-01-25 | End: 2022-01-25 | Stop reason: HOSPADM

## 2022-01-24 RX ORDER — PANTOPRAZOLE SODIUM 40 MG/1
40 TABLET, DELAYED RELEASE ORAL
Status: DISCONTINUED | OUTPATIENT
Start: 2022-01-25 | End: 2022-01-25 | Stop reason: HOSPADM

## 2022-01-24 RX ORDER — ONDANSETRON 4 MG/1
4 TABLET, ORALLY DISINTEGRATING ORAL EVERY 8 HOURS PRN
Status: DISCONTINUED | OUTPATIENT
Start: 2022-01-24 | End: 2022-01-25 | Stop reason: HOSPADM

## 2022-01-24 RX ORDER — SODIUM CHLORIDE 9 MG/ML
25 INJECTION, SOLUTION INTRAVENOUS PRN
Status: DISCONTINUED | OUTPATIENT
Start: 2022-01-24 | End: 2022-01-25 | Stop reason: HOSPADM

## 2022-01-24 RX ORDER — AMITRIPTYLINE HYDROCHLORIDE 25 MG/1
25 TABLET, FILM COATED ORAL NIGHTLY
Status: DISCONTINUED | OUTPATIENT
Start: 2022-01-25 | End: 2022-01-25 | Stop reason: HOSPADM

## 2022-01-24 RX ORDER — DIMETHICONE, OXYBENZONE, AND PADIMATE O 2; 2.5; 6.6 G/100G; G/100G; G/100G
STICK TOPICAL
Status: DISCONTINUED
Start: 2022-01-24 | End: 2022-01-24

## 2022-01-24 RX ORDER — LEVOTHYROXINE SODIUM 0.12 MG/1
125 TABLET ORAL DAILY
Status: DISCONTINUED | OUTPATIENT
Start: 2022-01-25 | End: 2022-01-25 | Stop reason: HOSPADM

## 2022-01-24 RX ORDER — ONDANSETRON 2 MG/ML
4 INJECTION INTRAMUSCULAR; INTRAVENOUS EVERY 6 HOURS PRN
Status: DISCONTINUED | OUTPATIENT
Start: 2022-01-24 | End: 2022-01-25 | Stop reason: HOSPADM

## 2022-01-24 RX ORDER — IPRATROPIUM BROMIDE AND ALBUTEROL SULFATE 2.5; .5 MG/3ML; MG/3ML
1 SOLUTION RESPIRATORY (INHALATION)
Status: DISCONTINUED | OUTPATIENT
Start: 2022-01-25 | End: 2022-01-25

## 2022-01-24 RX ORDER — MIRTAZAPINE 30 MG/1
30 TABLET, FILM COATED ORAL NIGHTLY
Status: DISCONTINUED | OUTPATIENT
Start: 2022-01-25 | End: 2022-01-25 | Stop reason: HOSPADM

## 2022-01-24 RX ORDER — NADOLOL 40 MG/1
20 TABLET ORAL DAILY
Status: DISCONTINUED | OUTPATIENT
Start: 2022-01-25 | End: 2022-01-25 | Stop reason: HOSPADM

## 2022-01-24 RX ORDER — ATORVASTATIN CALCIUM 40 MG/1
40 TABLET, FILM COATED ORAL DAILY
Status: DISCONTINUED | OUTPATIENT
Start: 2022-01-25 | End: 2022-01-25 | Stop reason: HOSPADM

## 2022-01-24 RX ORDER — TRAMADOL HYDROCHLORIDE 50 MG/1
50 TABLET ORAL EVERY 6 HOURS PRN
Status: DISCONTINUED | OUTPATIENT
Start: 2022-01-24 | End: 2022-01-25 | Stop reason: HOSPADM

## 2022-01-24 RX ORDER — 0.9 % SODIUM CHLORIDE 0.9 %
1000 INTRAVENOUS SOLUTION INTRAVENOUS ONCE
Status: COMPLETED | OUTPATIENT
Start: 2022-01-24 | End: 2022-01-24

## 2022-01-24 RX ADMIN — SODIUM CHLORIDE 1000 ML: 9 INJECTION, SOLUTION INTRAVENOUS at 18:40

## 2022-01-24 RX ADMIN — METHYLPREDNISOLONE SODIUM SUCCINATE 80 MG: 125 INJECTION, POWDER, FOR SOLUTION INTRAMUSCULAR; INTRAVENOUS at 16:35

## 2022-01-24 RX ADMIN — IPRATROPIUM BROMIDE AND ALBUTEROL SULFATE 1 AMPULE: .5; 3 SOLUTION RESPIRATORY (INHALATION) at 16:35

## 2022-01-24 RX ADMIN — LEVOFLOXACIN 750 MG: 5 INJECTION, SOLUTION INTRAVENOUS at 18:40

## 2022-01-24 ASSESSMENT — PAIN SCALES - GENERAL: PAINLEVEL_OUTOF10: 0

## 2022-01-24 NOTE — PLAN OF CARE
65yo woman with ILD presents form home with worsening dyspnea. Per ED report she is presently on NRB, and drops sats to 80s on 5l/min NC. She is chronic on 3-5l/min at baseline. She is being admitted to PCU with O2 supplement, telemetry and pulm consultation for acute exacerbation of ILD. Her COVID and flu testing is again negative. There are no clear signs of acute bacterial PNA on her imaging or lab work today and she has multiple drug allergies - I did not order Abx to continue till she is seen by pulm team.     She received levaquin IV in ED.        Jones Mcardle, MD  1/24/2022  6:31 PM

## 2022-01-24 NOTE — ED PROVIDER NOTES
[facility] EMERGENCY DEPARTMENT      CHIEF COMPLAINT  Shortness of Breath (pt has been feeling bad and increased SOB for past couple days. Pt states that today it got worse and that she unable to catcher her breath. Pt wears 2-3L at baseline. Pt on 10L non-rebreather at this time. )     HISTORY OF PRESENT ILLNESS  Angelica Parks is a 74 y.o. female  who presents to the ED complaining of worsening shortness of breath and productive cough. Patient states that symptoms have been worsening for the past few days. States that today it worsened to the point where she was unable to catch her breath. She wears 2 to 3 L at baseline, states that she bumped herself up to 4 L but was still feeling short of breath. Patient states that she has had a cough productive of greenish/yellow sputum for the past 2 days. Denies any fevers. She denies other complaints or concerns. No other complaints, modifying factors or associated symptoms. I have reviewed the following from the nursing documentation. Past Medical History:   Diagnosis Date    A-fib ([address]     Anxiety     Cryptogenic organizing pneumonia ([address]     Depression     GERD (gastroesophageal reflux disease)     Hyperlipidemia     On home oxygen therapy     uses O2 3L prn    Rash     Thyroid disease     hypothyroidism     Past Surgical History:   Procedure Laterality Date    ARM SURGERY Left 3/2/2020    LEFT ULNAR NERVE DECOMPRESSION AT THE ELBOW performed by Clabe Leyden, MD at [facility] 6/27/2019    EBUS WF W/ANES.  performed by Maddy Gray MD at [address] [place] [address]  6/27/2019    BRONCHOSCOPY/TRANSBRONCHIAL NEEDLE BIOPSY performed by Maddy Gray MD at [address] [place] [address]  6/27/2019    BRONCHOSCOPY/TRANSBRONCHIAL LUNG BIOPSY performed by Maddy Gray MD at [address] [place] [address]  6/27/2019    BRONCHOSCOPY ALVEOLAR LAVAGE performed by Maddy Gray MD at [address] [facility] Clubs or Organizations: Not on file    Attends Club or Organization Meetings: Not on file    Marital Status: Not on file   Intimate Partner Violence:     Fear of Current or Ex-Partner: Not on file    Emotionally Abused: Not on file    Physically Abused: Not on file    Sexually Abused: Not on file   Housing Stability:     Unable to Pay for Housing in the Last Year: Not on file    Number of [place] in the Last Year: Not on file    Unstable Housing in the Last Year: Not on file     Current Facility-Administered Medications   Medication Dose Route Frequency Provider Last Rate Last Admin    medicated lip balm (BLISTEX/CARMEX) 2-2.5-6.6 % stick             levoFLOXacin (LEVAQUIN) 750 MG/150ML infusion 750 mg  750 mg IntraVENous Once Cecilio Blood,  mL/hr at 01/24/22 1840 750 mg at 01/24/22 1840    0.9 % sodium chloride bolus  1,000 mL IntraVENous Once Cecilio Holt MD 1,000 mL/hr at 01/24/22 1840 1,000 mL at 01/24/22 1840     Current Outpatient Medications   Medication Sig Dispense Refill    benzonatate (TESSALON) 200 MG capsule TAKE 1 CAPSULE BY MOUTH 3 TIMES A DAY AS NEEDED FOR COUGH 90 capsule 5    empagliflozin (JARDIANCE) 25 MG tablet Take 1 tablet by mouth daily 90 tablet 1    SITagliptin (JANUVIA) 100 MG tablet Take 1 tablet by mouth daily 90 tablet 1    pantoprazole (PROTONIX) 40 MG tablet Take 1 tablet by mouth every morning (before breakfast) 90 tablet 1    sertraline (ZOLOFT) 100 MG tablet Take 2 tablets by mouth daily 180 tablet 1    mirtazapine (REMERON) 30 MG tablet Take 1 tablet by mouth nightly 90 tablet 1    amitriptyline (ELAVIL) 25 MG tablet TAKE 1 TABLET BY MOUTH EVERY DAY AT NIGHT 90 tablet 1    nadolol (CORGARD) 20 MG tablet Take 1 tablet by mouth daily 90 tablet 1    atorvastatin (LIPITOR) 40 MG tablet Take 1 tablet by mouth daily 90 tablet 0    levothyroxine (SYNTHROID) 125 MCG tablet Take 1 tablet by mouth Daily TAKE 1 TABLET BY MOUTH EVERY DAY 90 tablet 1  traMADol (ULTRAM) 50 MG tablet Take 1-2 tablets by mouth every 6 hours as needed for Pain for up to 180 days. Intended supply: 7 days. Take lowest dose possible to manage pain 120 tablet 5    loratadine-pseudoephedrine ([employer] ALLERGY RELIEF-D12) 5-120 MG per extended release tablet TAKE 1 TABLET BY MOUTH TWICE A DAY 60 tablet 5    albuterol sulfate HFA (PROVENTIL HFA) 108 (90 Base) MCG/ACT inhaler Inhale 2 puffs into the lungs every 6 hours as needed for Wheezing 3 each 1    albuterol (PROVENTIL) (2.5 MG/3ML) 0.083% nebulizer solution INHALE THE CONTENTS OF 1 VIAL VIA NEBULIZER EVERY 6 HOURS AS NEEDED FOR WHEEZING 720 mL 1    Blood Glucose Monitoring Suppl (TRUE METRIX METER) PUJA Test daily and as needed 1 each 0    blood glucose test strips (ASCENSIA AUTODISC VI;ONE TOUCH ULTRA TEST VI) strip 1 each by In Vitro route daily As needed. 100 each 3    TRUEplus Lancets 33G MISC Use daily 100 each 3    Blood Glucose Calibration (TRUE METRIX LEVEL 1) Low SOLN use as needed 1 each 2    lidocaine 4 % external patch Place 2 patches onto the skin daily 60 patch 1    Loratadine-Pseudoephedrine (LORATADINE-D 12HR PO) Take 1 tablet by mouth daily      hydrOXYzine (ATARAX) 10 MG tablet TAKE 1 TO 3 TABLETS BY MOUTH 3 TIMES DAILY AS NEEDED FOR ITCHING 90 tablet 1    sodium chloride (OCEAN, BABY AYR) 0.65 % nasal spray 1 spray by Nasal route as needed for Congestion      diclofenac sodium (VOLTAREN) 1 % GEL Apply 2 g topically 4 times daily 2 Tube 1    INSULIN SYRINGE 1CC/29G 29G X 1/2\" 1 ML MISC 1 each by Does not apply route 2 times daily 100 each 5    FLUTICASONE FUROATE IN Inhale into the lungs as needed      Multiple Vitamins-Minerals (MULTIVITAMIN ADULT PO) Take by mouth daily      OXYGEN Inhale 3 L into the lungs       Allergies   Allergen Reactions    Penicillins Anaphylaxis     Tolerates Meropenem.  Cefuroxime      Tolerates Meropenem.     Doxycycline Hives    Imitrex [Sumatriptan] Other (See Comments)     Feels like pins and needles    Meperidine     Sulfa Antibiotics Hives    Keflex [Cephalexin] Itching and Rash     Tolerates Meropenem.  Tape Neo Dryer Tape] Rash       REVIEW OF SYSTEMS  10 systems reviewed, pertinent positives per HPI otherwise noted to be negative. PHYSICAL EXAM  /69   Pulse 87   Temp 98 °F (36.7 °C) (Oral)   Resp (!) 36   Ht 5' 3\" (1.6 m)   Wt 170 lb (77.1 kg)   SpO2 100%   BMI 30.11 kg/m²    GENERAL APPEARANCE: Awake and alert. Cooperative. No acute distress. HENT: Normocephalic. Atraumatic. NECK: Supple. EYES: PERRL. EOM's grossly intact. HEART/CHEST: RRR. No murmurs. LUNGS: Diminished breath sounds bilaterally. ABDOMEN: No tenderness. Soft. Non-distended. No masses. No organomegaly. No guarding or rebound. MUSCULOSKELETAL: No extremity edema. Compartments soft. No deformity. No tenderness in the extremities. All extremities neurovascularly intact. SKIN: Warm and dry. No acute rashes. NEUROLOGICAL: Alert and oriented. CN's 2-12 intact. No gross facial drooping. Strength 5/5, sensation intact. PSYCHIATRIC: Normal mood and affect. LABS  I have reviewed all labs for this visit.    Results for orders placed or performed during the hospital encounter of 01/24/22   COVID-19, Rapid    Specimen: Nasopharyngeal Swab   Result Value Ref Range    SARS-CoV-2, NAAT Not Detected Not Detected   Rapid influenza A/B antigens    Specimen: Nasopharyngeal   Result Value Ref Range    Rapid Influenza A Ag Negative Negative    Rapid Influenza B Ag Negative Negative   CBC Auto Differential   Result Value Ref Range    WBC 9.5 4.0 - 11.0 K/uL    RBC 4.44 4.00 - 5.20 M/uL    Hemoglobin 10.8 (L) 12.0 - 16.0 g/dL    Hematocrit 33.5 (L) 36.0 - 48.0 %    MCV 75.4 (L) 80.0 - 100.0 fL    MCH 24.3 (L) 26.0 - 34.0 pg    MCHC 32.3 31.0 - 36.0 g/dL    RDW 18.5 (H) 12.4 - 15.4 %    Platelets 600 037 - 176 K/uL    MPV 7.5 5.0 - 10.5 fL    Neutrophils % 65.0 %    Lymphocytes % 16.6 %    Monocytes % 5.2 %    Eosinophils % 12.3 %    Basophils % 0.9 %    Neutrophils Absolute 6.2 1.7 - 7.7 K/uL    Lymphocytes Absolute 1.6 1.0 - 5.1 K/uL    Monocytes Absolute 0.5 0.0 - 1.3 K/uL    Eosinophils Absolute 1.2 (H) 0.0 - 0.6 K/uL    Basophils Absolute 0.1 0.0 - 0.2 K/uL   Blood Gas, Venous   Result Value Ref Range    pH, Jasper 7.401 7.350 - 7.450    pCO2, Jasper 49.5 40.0 - 50.0 mmHg    pO2, Jasper 48.0 (H) 25.0 - 40.0 mmHg    HCO3, Venous 30.0 (H) 23.0 - 29.0 mmol/L    Base Excess, Jasper 4.4 (H) -3.0 - 3.0 mmol/L    O2 Sat, Jasper 83 Not Established %    Carboxyhemoglobin 2.7 (H) 0.0 - 1.5 %    MetHgb, Jasper 0.3 <1.5 %    TC02 (Calc), [place]asper 32 Not Established mmol/L    O2 Content, Jasper 14 Not Established VOL %    O2 Therapy Unknown    Urinalysis, reflex to microscopic   Result Value Ref Range    Color, UA Yellow Straw/Yellow    Clarity, UA Clear Clear    Glucose, Ur >=1000 (A) Negative mg/dL    Bilirubin Urine Negative Negative    Ketones, Urine Negative Negative mg/dL    Specific Gravity, UA 1.010 1.005 - 1.030    Blood, Urine Negative Negative    pH, UA 6.5 5.0 - 8.0    Protein, UA Negative Negative mg/dL    Urobilinogen, Urine 0.2 <2.0 E.U./dL    Nitrite, Urine Negative Negative    Leukocyte Esterase, Urine Negative Negative    Microscopic Examination Not Indicated     Urine Type NotGiven    Lactate, Sepsis   Result Value Ref Range    Lactic Acid, Sepsis 1.0 0.4 - 1.9 mmol/L   Comprehensive Metabolic Panel w/ Reflex to MG   Result Value Ref Range    Sodium 141 136 - 145 mmol/L    Potassium reflex Magnesium 3.9 3.5 - 5.1 mmol/L    Chloride 102 99 - 110 mmol/L    CO2 29 21 - 32 mmol/L    Anion Gap 10 3 - 16    Glucose 128 (H) 70 - 99 mg/dL    BUN 9 7 - 20 mg/dL    CREATININE <0.5 (L) 0.6 - 1.2 mg/dL    GFR Non-African American >60 >60    GFR African American >60 >60    Calcium 8.8 8.3 - 10.6 mg/dL    Total Protein 7.4 6.4 - 8.2 g/dL    Albumin 3.1 (L) 3.4 - 5.0 g/dL    Albumin/Globulin Ratio 0.7 (L) 1.1 - 2.2 be hospitalized for further work-up and treatment of her acute on chronic respiratory failure. The total Critical Care time is 35 minutes which excludes separately billable procedures. During the patient's ED course, the patient was given:  Medications   medicated lip balm (BLISTEX/CARMEX) 2-2.5-6.6 % stick (  Canceled Entry 1/24/22 1631)   levoFLOXacin (LEVAQUIN) 750 MG/150ML infusion 750 mg (750 mg IntraVENous New Bag 1/24/22 1840)   0.9 % sodium chloride bolus (1,000 mLs IntraVENous New Bag 1/24/22 1840)   ipratropium-albuterol (DUONEB) nebulizer solution 1 ampule (1 ampule Inhalation Given 1/24/22 1635)   methylPREDNISolone sodium (SOLU-MEDROL) injection 80 mg (80 mg IntraVENous Given 1/24/22 1635)        CLINICAL IMPRESSION  1. Acute on chronic respiratory failure with hypoxia (HCC)        Blood pressure 115/69, pulse 87, temperature 98 °F (36.7 °C), temperature source Oral, resp. rate (!) 36, height 5' 3\" (1.6 m), weight 170 lb (77.1 kg), SpO2 100 %, not currently breastfeeding. 37 Jaylene Rodriguez was admitted in stable condition. Patient was given scripts for the following medications. I counseled patient how to take these medications. New Prescriptions    No medications on file       Follow-up with:  No follow-up provider specified. DISCLAIMER: This chart was created using Dragon dictation software. Efforts were made by me to ensure accuracy, however some errors may be present due to limitations of this technology and occasionally words are not transcribed correctly.        Diaz Chicas MD  01/24/22 1643

## 2022-01-24 NOTE — Clinical Note
Patient Class: Inpatient [101]   REQUIRED: Diagnosis: ILD (interstitial lung disease) ([facility] [[id number]]   Estimated Length of Stay: Estimated stay of more than 2 midnights   Admitting Provider: Jenna Stvoall [[id number]]   Telemetry/Cardiac Monitoring Required?: Yes

## 2022-01-24 NOTE — LETTER
[address] [facility] Emergency Department  [facility] [address]  Phone: [phone]             January 24, 2022    Patient: Francis Jiménez   YOB: 1955   Date of Visit: 1/24/2022       To Whom It May Concern: Charlie Lara was seen and treated in our emergency department on 1/24/2022 her  Leila Weems was with her. He may return to work on 1/25/22.       Sincerely,             Signature:__________________________________

## 2022-01-25 ENCOUNTER — TELEPHONE (OUTPATIENT)
Dept: PULMONOLOGY | Age: 67
End: 2022-01-25

## 2022-01-25 VITALS
HEIGHT: 63 IN | HEART RATE: 93 BPM | DIASTOLIC BLOOD PRESSURE: 72 MMHG | WEIGHT: 173.5 LBS | BODY MASS INDEX: 30.74 KG/M2 | TEMPERATURE: 98.3 F | OXYGEN SATURATION: 93 % | RESPIRATION RATE: 18 BRPM | SYSTOLIC BLOOD PRESSURE: 117 MMHG

## 2022-01-25 LAB
EKG ATRIAL RATE: 81 BPM
EKG DIAGNOSIS: NORMAL
EKG P AXIS: 44 DEGREES
EKG P-R INTERVAL: 148 MS
EKG Q-T INTERVAL: 376 MS
EKG QRS DURATION: 80 MS
EKG QTC CALCULATION (BAZETT): 436 MS
EKG R AXIS: -6 DEGREES
EKG T AXIS: -11 DEGREES
EKG VENTRICULAR RATE: 81 BPM
GLUCOSE BLD-MCNC: 113 MG/DL (ref 70–99)
GLUCOSE BLD-MCNC: 129 MG/DL (ref 70–99)
INFLUENZA A: NOT DETECTED
INFLUENZA B: NOT DETECTED
PERFORMED ON: ABNORMAL
PERFORMED ON: ABNORMAL
SARS-COV-2 RNA, RT PCR: NOT DETECTED

## 2022-01-25 PROCEDURE — 6370000000 HC RX 637 (ALT 250 FOR IP): Performed by: INTERNAL MEDICINE

## 2022-01-25 PROCEDURE — 94761 N-INVAS EAR/PLS OXIMETRY MLT: CPT

## 2022-01-25 PROCEDURE — 6360000002 HC RX W HCPCS: Performed by: INTERNAL MEDICINE

## 2022-01-25 PROCEDURE — 87636 SARSCOV2 & INF A&B AMP PRB: CPT

## 2022-01-25 PROCEDURE — 94640 AIRWAY INHALATION TREATMENT: CPT

## 2022-01-25 PROCEDURE — 97110 THERAPEUTIC EXERCISES: CPT

## 2022-01-25 PROCEDURE — 97530 THERAPEUTIC ACTIVITIES: CPT

## 2022-01-25 PROCEDURE — 93010 ELECTROCARDIOGRAM REPORT: CPT | Performed by: INTERNAL MEDICINE

## 2022-01-25 PROCEDURE — 99238 HOSP IP/OBS DSCHRG MGMT 30/<: CPT | Performed by: INTERNAL MEDICINE

## 2022-01-25 PROCEDURE — 99222 1ST HOSP IP/OBS MODERATE 55: CPT | Performed by: INTERNAL MEDICINE

## 2022-01-25 PROCEDURE — 6370000000 HC RX 637 (ALT 250 FOR IP)

## 2022-01-25 PROCEDURE — 97161 PT EVAL LOW COMPLEX 20 MIN: CPT

## 2022-01-25 PROCEDURE — 2580000003 HC RX 258: Performed by: INTERNAL MEDICINE

## 2022-01-25 PROCEDURE — 2700000000 HC OXYGEN THERAPY PER DAY

## 2022-01-25 RX ORDER — PREDNISONE 20 MG/1
40 TABLET ORAL DAILY
Status: DISCONTINUED | OUTPATIENT
Start: 2022-01-25 | End: 2022-01-25 | Stop reason: HOSPADM

## 2022-01-25 RX ORDER — BENZONATATE 100 MG/1
200 CAPSULE ORAL 3 TIMES DAILY PRN
Status: DISCONTINUED | OUTPATIENT
Start: 2022-01-25 | End: 2022-01-25 | Stop reason: HOSPADM

## 2022-01-25 RX ORDER — PREDNISONE 10 MG/1
TABLET ORAL
Qty: 30 TABLET | Refills: 0 | Status: SHIPPED | OUTPATIENT
Start: 2022-01-25 | End: 2022-02-09

## 2022-01-25 RX ORDER — IPRATROPIUM BROMIDE AND ALBUTEROL SULFATE 2.5; .5 MG/3ML; MG/3ML
1 SOLUTION RESPIRATORY (INHALATION) EVERY 4 HOURS PRN
Status: DISCONTINUED | OUTPATIENT
Start: 2022-01-25 | End: 2022-01-25 | Stop reason: HOSPADM

## 2022-01-25 RX ORDER — GUAIFENESIN 600 MG/1
600 TABLET, EXTENDED RELEASE ORAL 2 TIMES DAILY
Status: DISCONTINUED | OUTPATIENT
Start: 2022-01-25 | End: 2022-01-25 | Stop reason: HOSPADM

## 2022-01-25 RX ORDER — LEVOFLOXACIN 500 MG/1
500 TABLET, FILM COATED ORAL DAILY
Qty: 6 TABLET | Refills: 0 | Status: SHIPPED | OUTPATIENT
Start: 2022-01-25 | End: 2022-01-31

## 2022-01-25 RX ORDER — IPRATROPIUM BROMIDE AND ALBUTEROL SULFATE 2.5; .5 MG/3ML; MG/3ML
1 SOLUTION RESPIRATORY (INHALATION) 4 TIMES DAILY
Status: DISCONTINUED | OUTPATIENT
Start: 2022-01-25 | End: 2022-01-25

## 2022-01-25 RX ADMIN — SERTRALINE 100 MG: 100 TABLET, FILM COATED ORAL at 08:48

## 2022-01-25 RX ADMIN — PREDNISONE 40 MG: 20 TABLET ORAL at 08:48

## 2022-01-25 RX ADMIN — Medication 10 ML: at 08:48

## 2022-01-25 RX ADMIN — LEVOTHYROXINE SODIUM 125 MCG: 125 TABLET ORAL at 06:15

## 2022-01-25 RX ADMIN — PANTOPRAZOLE SODIUM 40 MG: 40 TABLET, DELAYED RELEASE ORAL at 06:15

## 2022-01-25 RX ADMIN — IPRATROPIUM BROMIDE AND ALBUTEROL SULFATE 1 AMPULE: .5; 2.5 SOLUTION RESPIRATORY (INHALATION) at 00:52

## 2022-01-25 RX ADMIN — MIRTAZAPINE 30 MG: 30 TABLET, FILM COATED ORAL at 00:45

## 2022-01-25 RX ADMIN — IPRATROPIUM BROMIDE AND ALBUTEROL 1 PUFF: 20; 100 SPRAY, METERED RESPIRATORY (INHALATION) at 06:59

## 2022-01-25 RX ADMIN — ENOXAPARIN SODIUM 40 MG: 100 INJECTION SUBCUTANEOUS at 08:51

## 2022-01-25 RX ADMIN — IPRATROPIUM BROMIDE AND ALBUTEROL SULFATE 1 AMPULE: .5; 2.5 SOLUTION RESPIRATORY (INHALATION) at 11:08

## 2022-01-25 RX ADMIN — NADOLOL 20 MG: 40 TABLET ORAL at 08:48

## 2022-01-25 RX ADMIN — BENZONATATE 200 MG: 100 CAPSULE ORAL at 04:17

## 2022-01-25 RX ADMIN — BENZONATATE 200 MG: 100 CAPSULE ORAL at 11:22

## 2022-01-25 RX ADMIN — ATORVASTATIN CALCIUM 40 MG: 40 TABLET, FILM COATED ORAL at 08:48

## 2022-01-25 RX ADMIN — GUAIFENESIN 600 MG: 600 TABLET, EXTENDED RELEASE ORAL at 08:48

## 2022-01-25 RX ADMIN — TRAMADOL HYDROCHLORIDE 50 MG: 50 TABLET, COATED ORAL at 03:31

## 2022-01-25 ASSESSMENT — PAIN SCALES - GENERAL
PAINLEVEL_OUTOF10: 7
PAINLEVEL_OUTOF10: 4
PAINLEVEL_OUTOF10: 0

## 2022-01-25 NOTE — PROGRESS NOTES
Progress Note    Admit Date:  1/24/2022    Subjective:  Ms. Alis Boyer is a 74year old female with pmhx of ILD who presented initially with worsening dyspnea, she is on 2-3 L on home. Was on 6 L, weaned her down to 4 L. COVID and Flu test initially negative. Pt states she is feeling better today. COVID PCR ordered. Objective:   /73   Pulse 102   Temp 98.9 °F (37.2 °C) (Oral)   Resp 22   Ht 5' 3\" (1.6 m)   Wt 173 lb 8 oz (78.7 kg)   SpO2 95%   BMI 30.73 kg/m²          Intake/Output Summary (Last 24 hours) at 1/25/2022 0825  Last data filed at 1/25/2022 0318  Gross per 24 hour   Intake 150 ml   Output 400 ml   Net -250 ml       Physical Exam:    General appearance: alert, appears stated age and cooperative/appears older than stated age  Head: Normocephalic, without obvious abnormality, atraumatic  Eyes: conjunctivae/corneas clear. PERRL, EOM's intact. Neck: no adenopathy, no carotid bruit, no JVD, supple, symmetrical, trachea midline and thyroid not enlarged, symmetric, no tenderness/mass/nodules  Lungs: Mild crackles bilaterally/ minimal accessory muscle use.  No wheezes or rhonchi  Heart: increased rate and regular rhythm, S1, S2 normal, no murmur, click, rub or gallop  Abdomen: soft, non-tender; bowel sounds normal; no masses,  no organomegaly  Extremities: extremities normal, atraumatic, no cyanosis or edema  Pulses: 2+ and symmetric  Skin: Skin color, texture, turgor normal. No rashes or lesions  Neurologic: Grossly normal    Scheduled Meds:   guaiFENesin  600 mg Oral BID    albuterol-ipratropium  1 puff Inhalation 4x daily    predniSONE  40 mg Oral Daily    amitriptyline  25 mg Oral Nightly    atorvastatin  40 mg Oral Daily    insulin lispro  0-12 Units SubCUTAneous TID WC    insulin lispro  0-6 Units SubCUTAneous Nightly    levothyroxine  125 mcg Oral Daily    mirtazapine  30 mg Oral Nightly    nadolol  20 mg Oral Daily    pantoprazole  40 mg Oral QAM AC    sertraline  100 mg Oral Daily    sodium chloride flush  5-40 mL IntraVENous 2 times per day    enoxaparin  40 mg SubCUTAneous Daily    levofloxacin  500 mg IntraVENous Q24H       Continuous Infusions:   sodium chloride         PRN Meds:  ipratropium-albuterol, benzonatate, hydrOXYzine, traMADol, sodium chloride flush, sodium chloride, ondansetron **OR** ondansetron, polyethylene glycol, acetaminophen **OR** acetaminophen      Data:  CBC:   Recent Labs     01/24/22  1615   WBC 9.5   HGB 10.8*   HCT 33.5*   MCV 75.4*        BMP:   Recent Labs     01/24/22  1645      K 3.9      CO2 29   BUN 9   CREATININE <0.5*   Results for Poonam Sainz (MRN [id number]) as of 1/25/2022 07:43   Ref. Range 1/24/2022 16:15   Lactic Acid, Sepsis Latest Ref Range: 0.4 - 1.9 mmol/L 1.0     LIVER PROFILE:   Recent Labs     01/24/22  1645   AST 34   ALT 12   BILITOT <0.2   ALKPHOS 118   Results for Poonam Sainz (MRN [id number]) as of 1/25/2022 07:43   Ref. Range 1/24/2022 16:45   Pro-BNP Latest Ref Range: 0 - 124 pg/mL 711 (H)   Troponin Latest Ref Range: <0.01 ng/mL <0.01     Cultures:   Results for Poonam Sainz (MRN [id number]) as of 1/25/2022 07:43   Ref. Range 1/24/2022 16:38   Rapid Influenza A Ag Latest Ref Range: Negative  Negative   Rapid Influenza B Ag Latest Ref Range: Negative  Negative   RAPID INFLUENZA A/B ANTIGENS Unknown Rpt   SARS-CoV-2, NAAT Latest Ref Range: Not Detected  Not Detected     XR CHEST PORTABLE   Final Result   Bilateral airspace disease suggestive of atypical viral pneumonia   superimposed on chronic lung disease. Previous studies  LABS:  Reviewed any pertinent new labs that are available. 2/6/19 Immunocap , IGE 9, CBC WNL except EOs 0.9:  Bronchoscopy 6/28/2019   left upper lobe, transbronchial biopsies:  - Fragments of benign bronchial wall. - Negative for granulomas or other significant inflammation.  - Negative for malignancy.   A. Right Paratracheal Lymph Node, Transbronchial Fine Needle Aspirate:       -  No malignant cells identified. B. Subcarinal Lymph Node, Transbronchial Fine Needle Aspirate:       -  No malignant cells identified. C. Lung, Left Upper Lobe, Bronchoalveolar Lavage:       -  No malignant cells identified. D. Lung, Bronchial Washings:       -  No malignant cells identified.     Subcarinal TB NA no phenotypically abnormal cells  AFB negative  Eosinophils 6%     CRP 56.9  AXEL negative  ANCA neg  HP panel neg     8/9/19 LHC/RHC: no CAD, PCWP 7, PA mean 19     8/21/19 Cryobiopsy at [facility]:  A.   Left lung, upper lobe, cryobiopsy:  - Alveolated lung parenchyma with organizing pneumonia pattern of injury. - Interstitial expansion by chronic inflammation and fibrosis with rare  ill-formed clusters of epithelioid histiocytes. - Negative for well-formed granulomas, giant cells, polarizable material,  atypia, and malignancy. B.   Left lung, lingula, cryobiopsy:  - Predominantly peribronchiolar lung parenchyma with interstitial fibrosis,  chronic inflammation and organizing pneumonia pattern of injury.  - Fragment of benign pleura. - Immunostains for CMV, HSV (I and II), special stains for fungal elements  (GMS) and bacterial organisms (Gram stain) are all negative. - Insitu hybridization for adenovirus is negative. - Negative for granulomas, giant cells, atypia, and malignancy. C.   Left lung, lower lobe, cryobiopsy:  - Alveolated lung parenchyma with organizing pneumonia pattern of injury. - Interstitial expansion by chronic inflammation and fibrosis with rare  ill-formed clusters of epithelioid histiocytes. - Negative for well-formed  granulomas, giant cells, atypia, and  malignancy.    Assessment:  Principal Problem:    Acute on chronic respiratory failure (HCC)  Active Problems:    HTN (hypertension)    Hyperlipidemia with target LDL less than 130    Hypothyroidism    ILD (interstitial lung disease) (HCC)    Chronic respiratory failure with hypoxia (HCC) Type 2 diabetes mellitus without complication (HCC)  Resolved Problems:    * No resolved hospital problems. *      Plan:    #Acute on chronic respiratory failure. Patient uses 2 to 3 L of oxygen at baseline. She was short of breath quite up to 6 L of oxygen at the time of admission. She is 95-96% on room air at present and I weaned her down to 4 L. She is subjectively feeling better. Rapid Covid test was negative. I ordered a Covid PCR test for influenza a and B-. #ILD with exacerbation. I suspect respiratory failure due to ILD extubation start prednisone 40 mg a day. Pulmonary consultation. #Possible pneumonia. May also be causing her ILD exacerbation. Will treat empirically with Levaquin for now. Her lactic acid level is normal.  Her white cell count is normal.  Not completely convinced about the pneumonia. #Hyperlipidemia. Continue Lipitor. #Diabetes mellitus type 2. Continue home regimen that includes Jardiance and Januvia. Add SSI. #Acquired hypothyroidism. Continue with Synthroid. #Lovenox for DVT prophylaxis. IMPORTANT: Please note that some portions of this note may have been created using Dragon voice recognition software. Some \"sound-alike\" and totally wrong word substitutions may have taken place due to known inherent limitations of any such software, including this voice recognition software. In spite of efforts to eliminate such errors, some may not have been corrected. So please read the note with this in mind and recognize such mistakes and understand the correct version using the  context. If there are still uncertainties in the mind of the medical provider reading this note about any aspect of the note, the provider can feel free to contact me. Thanks.          Charlotte Rosado MD, MD 1/25/2022 8:25 AM

## 2022-01-25 NOTE — CONSULTS
Reason for referral and CC: SOB    HISTORY OF PRESENT ILLNESS: 73 yo female well-known to me with a history of cryptogenic organizing pneumonia presented yesterday shortness of breath and was found to be hypoxic. States she has reports she feels that her baseline is currently on 3 L per of oxygen. Her home oxygen is normal at 4 L. She has a cough with clear sputum that is normal for her. No fever or chest pain. Past Medical History:   Diagnosis Date    A-fib ([address]     Anxiety     Cryptogenic organizing pneumonia ([address]     Depression     GERD (gastroesophageal reflux disease)     Hyperlipidemia     On home oxygen therapy     uses O2 3L prn    Rash     Thyroid disease     hypothyroidism     Past Surgical History:   Procedure Laterality Date    ARM SURGERY Left 3/2/2020    LEFT ULNAR NERVE DECOMPRESSION AT THE ELBOW [facility]    EBUS WF W/ANES.  performed by Janett Valdez MD at [address]  6/27/2019    BRONCHOSCOPY/TRANSBRONCHIAL NEEDLE BIOPSY performed by Janett Valdez MD at [address]  6/27/2019    BRONCHOSCOPY/TRANSBRONCHIAL LUNG BIOPSY performed by Janett Valdez MD at [address]  6/27/2019    BRONCHOSCOPY ALVEOLAR LAVAGE performed by Janett Valdez MD at [address]  07/10/2019    BRONCHOSCOPY N/A 7/10/2019    BRONCHOSCOPY ALVEOLAR LAVAGE performed by Dede Barajas MD at [address] Bilateral 08/06/2019    BRONCHOSCOPY N/A 8/6/2019    BRONCHOSCOPY ALVEOLAR LAVAGE performed by Lyle Corado MD at [address] N/A 12/24/2019    BRONCHOSCOPY ALVEOLAR LAVAGE performed by Lynn Tamayo MD at [facility], TOTAL ABDOMINAL      partial     Family History  family history includes Asthma in her sister; Diabetes in her mother; High Blood Pressure in her mother; Other in her father. Social History:  reports that she has never smoked. She has never used smokeless tobacco.   reports no history of alcohol use. ALLERGIES:  Patient is allergic to penicillins, cefuroxime, doxycycline, imitrex [sumatriptan], meperidine, sulfa antibiotics, keflex [cephalexin], and tape [adhesive tape]. Continuous Infusions:   sodium chloride       Scheduled Meds:   guaiFENesin  600 mg Oral BID    albuterol-ipratropium  1 puff Inhalation 4x daily    predniSONE  40 mg Oral Daily    amitriptyline  25 mg Oral Nightly    atorvastatin  40 mg Oral Daily    insulin lispro  0-12 Units SubCUTAneous TID WC    insulin lispro  0-6 Units SubCUTAneous Nightly    levothyroxine  125 mcg Oral Daily    mirtazapine  30 mg Oral Nightly    nadolol  20 mg Oral Daily    pantoprazole  40 mg Oral QAM AC    sertraline  100 mg Oral Daily    sodium chloride flush  5-40 mL IntraVENous 2 times per day    enoxaparin  40 mg SubCUTAneous Daily    levofloxacin  500 mg IntraVENous Q24H     PRN Meds:  ipratropium-albuterol, benzonatate, hydrOXYzine, traMADol, sodium chloride flush, sodium chloride, ondansetron **OR** ondansetron, polyethylene glycol, acetaminophen **OR** acetaminophen    REVIEW OF SYSTEMS:  Constitutional: Negative for fever  HENT: Negative for sore throat  Eyes: Negative for redness   Respiratory: + for dyspnea, cough  Cardiovascular: Negative for chest pain  Gastrointestinal: Negative for vomiting, diarrhea   Genitourinary: Negative for hematuria   Musculoskeletal: Negative for arthralgias   Skin: Negative for rash  Neurological: Negative for syncope  Hematological: Negative for adenopathy  Psychiatric/Behavorial: Negative for anxiety    PHYSICAL EXAM: /72   Pulse 93   Temp 98.3 °F (36.8 °C) (Oral)   Resp 18   Ht 5' 3\" (1.6 m)   Wt 173 lb 8 oz (78.7 kg)   SpO2 95%   BMI 30.73 kg/m²  on 3l  Constitutional:  No acute distress. Eyes: PERRL.  Conjunctivae anicteric. ENT: Normal nose. Normal tongue. Neck:  Trachea is midline. No thyroid tenderness. Respiratory: No accessory muscle usage. decreased breath sounds. No wheezes. No rales. No Rhonchi. Cardiovascular: Normal S1S2. No digit clubbing. No digit cyanosis. No LE edema. Gastrointestinal: No mass palpated. No tenderness palpated. No umbilical hernia. Lymphatic: No cervical or supraclavicular adenopathy. Skin: No rash on the exposed extremities. No Nodules or induration on exposed extremities. Psychiatric: No anxiety or Agitation. Alert and Oriented to person, place and time. CBC:   Recent Labs     01/24/22  1615   WBC 9.5   HGB 10.8*   HCT 33.5*   MCV 75.4*        BMP:   Recent Labs     01/24/22  1645      K 3.9      CO2 29   BUN 9   CREATININE <0.5*        Recent Labs     01/24/22  1645   AST 34   ALT 12   BILITOT <0.2   ALKPHOS 118     No results for input(s): PROTIME, INR in the last 72 hours. Recent Labs     01/24/22  1733   COLORU Yellow   PHUR 6.5   CLARITYU Clear   SPECGRAV 1.010   LEUKOCYTESUR Negative   UROBILINOGEN 0.2   BILIRUBINUR Negative   BLOODU Negative   GLUCOSEU >=1000*     No results for input(s): PHART, TPO4UWL, PO2ART in the last 72 hours. 1/24 and 125 COVID PCR negative    Chest imaging was reviewed by me and showed   Bilateral airspace disease suggestive of atypical viral pneumonia   superimposed on chronic lung disease. ASSESSMENT:  ·  Acute on chronic hypoxic respiratory failure  · Suspect she has a viral pneumonia although a recurrence of her cryptogenic organizing pneumonia is not ruled out. PLAN:  Supplemental oxygen to maintain SaO2 >92%; wean as tolerated   · Continue  Levaquin and prednisone taper  · I will plan to follow-up with the patient about 2 weeks to ensure that her symptoms are not recurring in which case she would require a prolonged prednisone taper.     Thank you Melisa Valenzuela MD for this consult

## 2022-01-25 NOTE — TELEPHONE ENCOUNTER
MD Beny Fox MA  Please schedule tele 2/14/22 to monitor symptoms off of prednisone. Tell pt to call sooner if she has increased SOB or hypoxia as she is tapering steroids.

## 2022-01-25 NOTE — DISCHARGE SUMMARY
Name:  Mikala Saucedo  Room:  /1896-65  MRN:    [id number]    Discharge Summary      This discharge summary is in conjunction with a complete physical exam done on the day of discharge. Discharging Physician: Kay Demarco MD      Admit: 1/24/2022  Discharge:   1/25/2022     Diagnoses this Admission    Principal Problem:    Acute on chronic respiratory failure (HCC)  Active Problems:    HTN (hypertension)    Hyperlipidemia with target LDL less than 130    Hypothyroidism    ILD (interstitial lung disease) ([facility]    Chronic respiratory failure with hypoxia (HCC)    Type 2 diabetes mellitus without complication ([facility]  Resolved Problems:    * No resolved hospital problems. *          Procedures (Please Review Full Report for Details)  N/A    Consults    IP CONSULT TO HOSPITALIST  IP CONSULT TO PULMONOLOGY      HPI:      74 y.o. female who presented to the hospital with a chief complaint of worsening shortness of breath. The patient has a history of chronic respiratory failure, on 3 to 5 L of oxygen. She has a history of interstitial lung disease and follows with Dr. Abdi Kauffman. Over the last several days she has been having issues with her breathing. She endorses a productive clear cough and worsening oxygen requirement. She has had to turn her oxygen up to 5 L of time just to maintain his saturations. Her cough is nonstop and she is getting no relief from home remedies. Yesterday she decided to show up to the emergency department. She is has been started on empiric antibiotic therapy and steroids. She will be admitted for pulmonary evaluation. Of note the patient is fully vaccinated for COVID-19 and has received two doses of the mRNA vaccine but has not received a booster just because she has been too ill to go get it.     Physical Exam at Discharge:  /72   Pulse 93   Temp 98.3 °F (36.8 °C) (Oral)   Resp 18   Ht 5' 3\" (1.6 m)   Wt 173 lb 8 oz (78.7 kg)   SpO2 93%   BMI 30.73 kg/m² See progress note      Hospital Course  #Acute on chronic respiratory failure. Patient uses 2 to 3 L of oxygen at baseline. She was short of breath quite up to 6 L of oxygen at the time of admission. She is 95-96% on room air at present and I weaned her down to 3L. She is subjectively feeling better. Rapid Covid test was negative. I ordered a Covid PCR test for influenza a and B-. They were negative. Discharge on Prednisone.     #ILD with exacerbation. I suspect respiratory failure due to ILD extubation started prednisone 40 mg a day. Pulmonary consultation. Discharge with Levaquiin     #Possible pneumonia. May also be causing her ILD exacerbation. Will treat empirically with Levaquin for now. Her lactic acid level is normal.  Her white cell count is normal.  Not completely convinced about the pneumonia. Discharge on Levaquin.     #Hyperlipidemia. Continued Lipitor.     #Diabetes mellitus type 2. Continued home regimen that includes Jardiance and Januvia. Added SSI.     #Acquired hypothyroidism. Continued with Synthroid.     #Lovenox for DVT prophylaxis. CBC:   Recent Labs     01/24/22  1615   WBC 9.5   HGB 10.8*   HCT 33.5*   MCV 75.4*        BMP:   Recent Labs     01/24/22  1645      K 3.9      CO2 29   BUN 9   CREATININE <0.5*     LIVER PROFILE:   Recent Labs     01/24/22  1645   AST 34   ALT 12   BILITOT <0.2   ALKPHOS 118     UA:  Recent Labs     01/24/22  1733   COLORU Yellow   PHUR 6.5   CLARITYU Clear   SPECGRAV 1.010   LEUKOCYTESUR Negative   UROBILINOGEN 0.2   BILIRUBINUR Negative   BLOODU Negative   GLUCOSEU >=1000*       Cultures:   Results for Lady Ritchie (MRN [id number]) as of 1/25/2022 07:43    Ref.  Range 1/24/2022 16:38   Rapid Influenza A Ag Latest Ref Range: Negative  Negative   Rapid Influenza B Ag Latest Ref Range: Negative  Negative   RAPID INFLUENZA A/B ANTIGENS Unknown Rpt   SARS-CoV-2, NAAT Latest Ref Range: Not Detected  Not Detected    Radiology    XR CHEST PORTABLE   Final Result   Bilateral airspace disease suggestive of atypical viral pneumonia   superimposed on chronic lung disease. Previous studies  LABS:  Reviewed any pertinent new labs that are available. 2/6/19 Immunocap , IGE 9, CBC WNL except EOs 0.9:  Bronchoscopy 6/28/2019   left upper lobe, transbronchial biopsies:  - Fragments of benign bronchial wall. - Negative for granulomas or other significant inflammation.  - Negative for malignancy. A. Right Paratracheal Lymph Node, Transbronchial Fine Needle Aspirate:       -  No malignant cells identified. B. Subcarinal Lymph Node, Transbronchial Fine Needle Aspirate:       -  No malignant cells identified. C. Lung, Left Upper Lobe, Bronchoalveolar Lavage:       -  No malignant cells identified. D. Lung, Bronchial Washings:       -  No malignant cells identified.     Subcarinal TB NA no phenotypically abnormal cells  AFB negative  Eosinophils 6%     CRP 56.9  AXEL negative  ANCA neg  HP panel neg     8/9/19 LHC/RHC: no CAD, PCWP 7, PA mean 19     8/21/19 Cryobiopsy at [facility]:  A.   Left lung, upper lobe, cryobiopsy:  - Alveolated lung parenchyma with organizing pneumonia pattern of injury. - Interstitial expansion by chronic inflammation and fibrosis with rare  ill-formed clusters of epithelioid histiocytes. - Negative for well-formed granulomas, giant cells, polarizable material,  atypia, and malignancy. B.   Left lung, lingula, cryobiopsy:  - Predominantly peribronchiolar lung parenchyma with interstitial fibrosis,  chronic inflammation and organizing pneumonia pattern of injury.  - Fragment of benign pleura. - Immunostains for CMV, HSV (I and II), special stains for fungal elements  (GMS) and bacterial organisms (Gram stain) are all negative. - Insitu hybridization for adenovirus is negative.   - Negative for granulomas, giant cells, atypia, and malignancy.     C.   Left lung, lower lobe, cryobiopsy:  - Alveolated lung parenchyma with organizing pneumonia pattern of injury. - Interstitial expansion by chronic inflammation and fibrosis with rare  ill-formed clusters of epithelioid histiocytes. - Negative for well-formed  granulomas, giant cells, atypia, and  malignancy.       Discharge Medications     Medication List      START taking these medications    levoFLOXacin 500 MG tablet  Commonly known as: LEVAQUIN  Take 1 tablet by mouth daily for 6 days     predniSONE 10 MG tablet  Commonly known as: DELTASONE  4 tabs for 3 days 3 tabs for 3 days 2 tabs for 3 days 1 tabs for 3 days        CONTINUE taking these medications    * albuterol (2.5 MG/3ML) 0.083% nebulizer solution  Commonly known as: PROVENTIL  INHALE THE CONTENTS OF 1 VIAL VIA NEBULIZER EVERY 6 HOURS AS NEEDED FOR WHEEZING     * albuterol sulfate  (90 Base) MCG/ACT inhaler  Commonly known as: Proventil HFA  Inhale 2 puffs into the lungs every 6 hours as needed for Wheezing     amitriptyline 25 MG tablet  Commonly known as: ELAVIL  TAKE 1 TABLET BY MOUTH EVERY DAY AT NIGHT     atorvastatin 40 MG tablet  Commonly known as: LIPITOR  Take 1 tablet by mouth daily     benzonatate 200 MG capsule  Commonly known as: TESSALON  TAKE 1 CAPSULE BY MOUTH 3 TIMES A DAY AS NEEDED FOR COUGH     blood glucose test strips strip  Commonly known as: [employer] AUTODISC VI;ONE TOUCH ULTRA TEST VI  1 each by In Vitro route daily As needed.      diclofenac sodium 1 % Gel  Commonly known as: VOLTAREN  Apply 2 g topically 4 times daily     empagliflozin 25 MG tablet  Commonly known as: JARDIANCE  Take 1 tablet by mouth daily     FLUTICASONE FUROATE IN     hydrOXYzine 10 MG tablet  Commonly known as: ATARAX  TAKE 1 TO 3 TABLETS BY MOUTH 3 TIMES DAILY AS NEEDED FOR ITCHING     INSULIN SYRINGE 1CC/29G 29G X 1/2\" 1 ML Misc  1 each by Does not apply route 2 times daily     levothyroxine 125 MCG tablet  Commonly known as: SYNTHROID  Take 1 tablet by mouth Daily TAKE 1 TABLET BY MOUTH EVERY DAY     lidocaine 4 % external patch  Place 2 patches onto the skin daily     * LORATADINE-D 12HR PO     * [facility] Allergy Relief-D12 5-120 MG per extended release tablet  Generic drug: loratadine-pseudoephedrine  TAKE 1 TABLET BY MOUTH TWICE A DAY     mirtazapine 30 MG tablet  Commonly known as: REMERON  Take 1 tablet by mouth nightly     MULTIVITAMIN ADULT PO     nadolol 20 MG tablet  Commonly known as: CORGARD  Take 1 tablet by mouth daily     OXYGEN     pantoprazole 40 MG tablet  Commonly known as: PROTONIX  Take 1 tablet by mouth every morning (before breakfast)     sertraline 100 MG tablet  Commonly known as: ZOLOFT  Take 2 tablets by mouth daily     SITagliptin 100 MG tablet  Commonly known as: JANUVIA  Take 1 tablet by mouth daily     sodium chloride 0.65 % nasal spray  Commonly known as: OCEAN, BABY AYR     traMADol 50 MG tablet  Commonly known as: Ultram  Take 1-2 tablets by mouth every 6 hours as needed for Pain for up to 180 days. Intended supply: 7 days. Take lowest dose possible to manage pain     True Metrix Level 1 Low Soln  use as needed     True Metrix Meter Lindsay  Test daily and as needed     TRUEplus Lancets 33G Misc  Use daily         * This list has 4 medication(s) that are the same as other medications prescribed for you. Read the directions carefully, and ask your doctor or other care provider to review them with you. Where to Get Your Medications      These medications were sent to [facility]/pharmacy #[id number]- [place], [place] - [address] [phone] - F [phone]  [address] [address]    Phone: [phone]   · levoFLOXacin 500 MG tablet  · predniSONE 10 MG tablet           Discharge Condition/Location: Stable    Follow Up: Follow up with PCP.         Beau Rizo MD 1/25/2022 11:24 AM

## 2022-01-25 NOTE — FLOWSHEET NOTE
01/24/22 2338   Vital Signs   Temp 97 °F (36.1 °C)   Temp Source Oral   Pulse 82   Heart Rate Source Monitor   Resp 20   /66   BP Location Right upper arm   Patient Position Sitting   Level of Consciousness Alert (0)   MEWS Score 1   Patient Currently in Pain Denies   Oxygen Therapy   SpO2 100 %   Pulse Oximeter Device Mode Continuous   O2 Device Non-rebreather mask   O2 Flow Rate (L/min) 4 L/min   Height and Weight   Height 5' 3\" (1.6 m)   Weight 173 lb 8 oz (78.7 kg)   Weight Method Actual;Bed scale   BSA (Calculated - sq m) 1.87 sq meters   BMI (Calculated) 30.8     Patient admitted to room 321 from [place] [place] [facility]. Patient oriented to room, call light, bed rails, phone, lights and bathroom. Patient instructed about the schedule of the day including: vital sign frequency, lab draws, possible tests, frequency of MD and staff rounds, daily weights, I &O's and prescribed diet. No bed alarm in place, patient aware of placement and reason, but would like to have it off due to being AOx4. Telemetry box in place, patient aware of placement and reason. Bed locked, in lowest position, side rails up 2/4, call light within reach. Recliner Assessment  Patient is able to demonstrate the ability to move from a reclining position to an upright position within the recliner. 4 Eyes Skin Assessment     The patient is being assess for   Admission    I agree that 2 RN's have performed a thorough Head to Toe Skin Assessment on the patient. ALL assessment sites listed below have been assessed.       Areas assessed for pressure by both nurses:   [x]   Head, Face, and Ears   [x]   Shoulders, Back, and Chest, Abdomen - scattered scabbing, L shoulder abrasion  [x]   Arms, Elbows, and Hands   [x]   Coccyx, Sacrum, and Ischium - blanchable redness  [x]   Legs, Feet, and Heels        Skin Assessed Under all Medical Devices by both nurses:  O2 device tubing              All Mepilex Borders were peeled back and area peeked at by both nurses:  No: NA  Please list where Mepilex Borders are located:  NA             **SHARE this note so that the co-signing nurse is able to place an eSignature**    Co-signer eSignature: Electronically signed by Roberto Pelaez RN on 1/25/22 at 2:32 AM EST    Does the Patient have Skin Breakdown related to pressure?   No            Amos Prevention initiated:  NA   Wound Care Orders initiated:  NA      WOC nurse consulted for Pressure Injury (Stage 3,4, Unstageable, DTI, NWPT, Complex wounds)and New or Established Ostomies:  NA      Primary Nurse eSignature: Electronically signed by Jyothi Thomas RN on 1/25/22 at 2:28 AM EST

## 2022-01-25 NOTE — PLAN OF CARE
Problem: Falls - Risk of:  Goal: Will remain free from falls  Description: Will remain free from falls  Outcome: Met This Shift  Goal: Absence of physical injury  Description: Absence of physical injury  Outcome: Met This Shift     Problem: Infection:  Goal: Will remain free from infection  Description: Will remain free from infection  Outcome: Met This Shift     Problem: Safety:  Goal: Free from accidental physical injury  Description: Free from accidental physical injury  Outcome: Met This Shift  Goal: Free from intentional harm  Description: Free from intentional harm  Outcome: Met This Shift     Problem: Daily Care:  Goal: Daily care needs are met  Description: Daily care needs are met  Outcome: Met This Shift     Problem: Pain:  Goal: Patient's pain/discomfort is manageable  Description: Patient's pain/discomfort is manageable  Outcome: Met This Shift     Problem: Skin Integrity:  Goal: Skin integrity will stabilize  Description: Skin integrity will stabilize  Outcome: Met This Shift     Problem: Discharge Planning:  Goal: Patients continuum of care needs are met  Description: Patients continuum of care needs are met  Outcome: Met This Shift

## 2022-01-25 NOTE — ED NOTES
Pt report provided to PCU. Pt report provided to [facility], ALS. Pt and family verbalize an understanding of admission and consent obtained.      [facility]  01/24/22 2011

## 2022-01-25 NOTE — PROGRESS NOTES
Patient educated on discharge instructions as well as new medications use, dosage, administration and possible side effects. Patient verified knowledge. IV removed without difficulty and dry dressing in place. Telemetry monitor removed and returned to [facility] [place]. Pt left facility in stable condition to Home with all of their personal belongings.

## 2022-01-25 NOTE — ED NOTES
End of shift report given to Pilot collins, [facility],Nidia 3, RN  01/24/22 2010

## 2022-01-25 NOTE — PROGRESS NOTES
RT Inhaler-Nebulizer Bronchodilator Protocol Note    There is a bronchodilator order in the chart from a provider indicating to follow the RT Bronchodilator Protocol and there is an Initiate RT Inhaler-Nebulizer Bronchodilator Protocol order as well (see protocol at bottom of note). CXR Findings:  XR CHEST PORTABLE    Result Date: 1/24/2022  Bilateral airspace disease suggestive of atypical viral pneumonia superimposed on chronic lung disease. The findings from the last RT Protocol Assessment were as follows:   History Pulmonary Disease: (P) Chronic pulmonary disease  Respiratory Pattern: (P) Dyspnea on exertion or RR 21-25 bpm  Breath Sounds: (P) Slightly diminished and/or crackles  Cough: (P) Strong, spontaneous, non-productive  Indication for Bronchodilator Therapy: (P) On home bronchodilators  Bronchodilator Assessment Score: (P) 6    Aerosolized bronchodilator medication orders have been revised according to the RT Inhaler-Nebulizer Bronchodilator Protocol below. Respiratory Therapist to perform RT Therapy Protocol Assessment initially then follow the protocol. Repeat RT Therapy Protocol Assessment PRN for score 0-3 or on second treatment, BID, and PRN for scores above 3. No Indications - adjust the frequency to every 6 hours PRN wheezing or bronchospasm, if no treatments needed after 48 hours then discontinue using Per Protocol order mode. If indication present, adjust the RT bronchodilator orders based on the Bronchodilator Assessment Score as indicated below. Use Inhaler orders unless patient has one or more of the following: on home nebulizer, not able to hold breath for 10 seconds, is not alert and oriented, cannot activate and use MDI correctly, or respiratory rate 25 breaths per minute or more, then use the equivalent nebulizer order(s) with same Frequency and PRN reasons based on the score.   If a patient is on this medication at home then do not decrease Frequency below that used at home. 0-3 - enter or revise RT bronchodilator order(s) to equivalent RT Bronchodilator order with Frequency of every 4 hours PRN for wheezing or increased work of breathing using Per Protocol order mode. 4-6 - enter or revise RT Bronchodilator order(s) to two equivalent RT bronchodilator orders with one order with BID Frequency and one order with Frequency of every 4 hours PRN wheezing or increased work of breathing using Per Protocol order mode. 7-10 - enter or revise RT Bronchodilator order(s) to two equivalent RT bronchodilator orders with one order with TID Frequency and one order with Frequency of every 4 hours PRN wheezing or increased work of breathing using Per Protocol order mode. 11-13 - enter or revise RT Bronchodilator order(s) to one equivalent RT bronchodilator order with QID Frequency and an Albuterol order with Frequency of every 4 hours PRN wheezing or increased work of breathing using Per Protocol order mode. Greater than 13 - enter or revise RT Bronchodilator order(s) to one equivalent RT bronchodilator order with every 4 hours Frequency and an Albuterol order with Frequency of every 2 hours PRN wheezing or increased work of breathing using Per Protocol order mode.          Electronically signed by Lindsay Liriano RCP on 1/25/2022 at 12:40 AM

## 2022-01-25 NOTE — FLOWSHEET NOTE
01/25/22 0836   Vital Signs   Temp 98.3 °F (36.8 °C)   Temp Source Oral   Pulse 93   Heart Rate Source Monitor   Resp 18   /72   BP Location Right upper arm   Patient Position Sitting   Level of Consciousness Alert (0)   MEWS Score 1   Patient Currently in Pain Denies   Oxygen Therapy   SpO2 95 %   Pulse Oximeter Device Mode Continuous   Pulse Oximeter Device Location Right;Finger   O2 Device High flow nasal cannula   Skin Assessment Clean, dry, & intact   O2 Flow Rate (L/min) 4 L/min     AM assessment complete. Pt a&o x4. Kake. Denies any pain or discomfort. Titrated to 3L via nc. Tolerating well. Spo2 93%. No edema. Denies any sob or cough. Call light and bedside table within reach. Will continue to monitor.

## 2022-01-25 NOTE — H&P
Hospital Medicine History & Physical      PCP: Jhonatan Santillan MD    Date of Admission: 1/24/2022    Date of Service: Pt seen/examined on 2022-01-24    Chief Complaint: Shortness of breath    History Of Present Illness:    74 y.o. female who presented to the hospital with a chief complaint of worsening shortness of breath. The patient has a history of chronic respiratory failure, on 3 to 5 L of oxygen. She has a history of interstitial lung disease and follows with Dr. Nette Beck. Over the last several days she has been having issues with her breathing. She endorses a productive clear cough and worsening oxygen requirement. She has had to turn her oxygen up to 5 L of time just to maintain his saturations. Her cough is nonstop and she is getting no relief from home remedies. Yesterday she decided to show up to the emergency department. She is has been started on empiric antibiotic therapy and steroids. She will be admitted for pulmonary evaluation. Of note the patient is fully vaccinated for COVID-19 and has received two doses of the mRNA vaccine but has not received a booster just because she has been too ill to go get it. Past Medical History:        Diagnosis Date    A-fib ([address]     Anxiety     Cryptogenic organizing pneumonia ([address]     Depression     GERD (gastroesophageal reflux disease)     Hyperlipidemia     On home oxygen therapy     uses O2 3L prn    Rash     Thyroid disease     hypothyroidism       Past Surgical History:          Procedure Laterality Date    ARM SURGERY Left 3/2/2020    LEFT ULNAR NERVE DECOMPRESSION AT THE ELBOW performed by Krissy Funes MD at [facility] 6/27/2019    EBUS WF W/ANES.  performed by Zina Santamaria MD at [address]  6/27/2019    BRONCHOSCOPY/TRANSBRONCHIAL NEEDLE BIOPSY performed by Zina Santamaria MD at [address]  6/27/2019    BRONCHOSCOPY/TRANSBRONCHIAL LUNG BIOPSY performed by Kesha Dyer MD at [address] [place] [address]  6/27/2019    BRONCHOSCOPY ALVEOLAR LAVAGE performed by Kesha Dyer MD at [address] [place] [address]  07/10/2019    BRONCHOSCOPY N/A 7/10/2019    BRONCHOSCOPY ALVEOLAR LAVAGE performed by Jerome Narvaez MD at [address] [place] [address] Bilateral 08/06/2019    BRONCHOSCOPY N/A 8/6/2019    BRONCHOSCOPY ALVEOLAR LAVAGE performed by Kaiser Salinas MD at [address] [place] [address] N/A 12/24/2019    BRONCHOSCOPY ALVEOLAR LAVAGE performed by Elmore Phalen, MD at [address] [place] [address], TOTAL ABDOMINAL      partial       Medications Prior to Admission:      Prior to Admission medications    Medication Sig Start Date End Date Taking?  Authorizing Provider   benzonatate (TESSALON) 200 MG capsule TAKE 1 CAPSULE BY MOUTH 3 TIMES A DAY AS NEEDED FOR COUGH 12/8/21   Kesha Dyer MD   empagliflozin (JARDIANCE) 25 MG tablet Take 1 tablet by mouth daily 11/17/21   Eloy Burkitt, MD   SITagliptin (JANUVIA) 100 MG tablet Take 1 tablet by mouth daily 11/17/21   Eloy Burkitt, MD   pantoprazole (PROTONIX) 40 MG tablet Take 1 tablet by mouth every morning (before breakfast) 11/17/21   Eloy Burkitt, MD   sertraline (ZOLOFT) 100 MG tablet Take 2 tablets by mouth daily 11/17/21   Eloy Burkitt, MD   mirtazapine (REMERON) 30 MG tablet Take 1 tablet by mouth nightly 11/17/21   Eloy Burkitt, MD   amitriptyline (ELAVIL) 25 MG tablet TAKE 1 TABLET BY MOUTH EVERY DAY AT NIGHT 11/17/21   Eloy Burkitt, MD   nadolol (CORGARD) 20 MG tablet Take 1 tablet by mouth daily 11/17/21   Eloy Burkitt, MD   atorvastatin (LIPITOR) 40 MG tablet Take 1 tablet by mouth daily 11/17/21   Eloy Burkitt, MD   levothyroxine (SYNTHROID) 125 MCG tablet Take 1 tablet by mouth Daily TAKE 1 TABLET BY MOUTH EVERY DAY 11/17/21   Eloy Burkitt, MD   traMADol (ULTRAM) 50 MG tablet Take 1-2 tablets by mouth every 6 hours as needed for Pain for up to 180 days. Intended supply: 7 days. Take lowest dose possible to manage pain 11/17/21 5/16/22  Jose M Jimenez MD   loratadine-pseudoephedrine ([employer] ALLERGY RELIEF-D12) 5-120 MG per extended release tablet TAKE 1 TABLET BY MOUTH TWICE A DAY 11/17/21   Jose M Jimenez MD   albuterol sulfate HFA (PROVENTIL HFA) 108 (90 Base) MCG/ACT inhaler Inhale 2 puffs into the lungs every 6 hours as needed for Wheezing 11/2/21   Ange Cool MD   albuterol (PROVENTIL) (2.5 MG/3ML) 0.083% nebulizer solution INHALE THE CONTENTS OF 1 VIAL VIA NEBULIZER EVERY 6 HOURS AS NEEDED FOR WHEEZING 10/25/21   Jose M Jimenez MD   Blood Glucose Monitoring Suppl (TRUE METRIX METER) PUJA Test daily and as needed 9/10/21   Jose M Jimenez MD   blood glucose test strips (ASCENSIA AUTODISC VI;ONE TOUCH ULTRA TEST VI) strip 1 each by In Vitro route daily As needed.  9/10/21   Jose M Jimenez MD   TRUEplus Lancets 33G MISC Use daily 9/10/21   Jose M Jimenez MD   Blood Glucose Calibration (TRUE METRIX LEVEL 1) Low SOLN use as needed 9/9/21   Jose M Jimenez MD   lidocaine 4 % external patch Place 2 patches onto the skin daily 6/21/21   Saloni Suarez MD   Loratadine-Pseudoephedrine (LORATADINE-D 12HR PO) Take 1 tablet by mouth daily    Historical Provider, MD   hydrOXYzine (ATARAX) 10 MG tablet TAKE 1 TO 3 TABLETS BY MOUTH 3 TIMES DAILY AS NEEDED FOR ITCHING 3/23/21   Jose M Jimenez MD   sodium chloride (OCEAN, BABY AYR) 0.65 % nasal spray 1 spray by Nasal route as needed for Congestion    Historical Provider, MD   diclofenac sodium (VOLTAREN) 1 % GEL Apply 2 g topically 4 times daily 7/2/20   Nunu Bedolla MD   INSULIN SYRINGE 1CC/29G 29G X 1/2\" 1 ML MISC 1 each by Does not apply route 2 times daily 3/6/20   Jose M Jimenez MD   FLUTICASONE FUROATE IN Inhale into the lungs as needed    Historical Provider, MD   Multiple Vitamins-Minerals (MULTIVITAMIN ADULT PO) Take by mouth daily    Historical Provider, MD   OXYGEN Inhale 3 L into the lungs 7/14/19   Historical Provider, MD       Allergies:  Penicillins, Cefuroxime, Doxycycline, Imitrex [sumatriptan], Meperidine, Sulfa antibiotics, Keflex [cephalexin], and Tape [adhesive tape]    Social History:      TOBACCO:   reports that she has never smoked. She has never used smokeless tobacco.  ETOH:   reports no history of alcohol use. Family History:        Problem Relation Age of Onset    Diabetes Mother     High Blood Pressure Mother     Asthma Sister     Other Father        REVIEW OF SYSTEMS:   Constitutional:  Negative for fever,chills or night sweats  ENT:  Negative for rhinorrhea, epistaxis, hoarseness, sore throat. Respiratory: Positive for cough and shortness of breath  Cardiovascular:   Negative for  chest pain, palpitations   Gastrointestinal:  Negative for nausea, vomiting, diarrhea  Genitourinary:  Negative for polyuria, dysuria   Hematologic/Lymphatic:  Negative for  bleeding tendency, easy bruising  Musculoskeletal:  Negative for myalgias and arthralgias  Neurologic:  Negative for  confusion,dysarthria. Skin:  Negative for itching,rash  Psychiatric:  Negative for depression,anxiety, agitation. Endocrine:  Negative for polydipsia,polyuria,heat /cold intolerance. PHYSICAL EXAM:    /66   Pulse 82   Temp 97 °F (36.1 °C) (Oral)   Resp 20   Ht 5' 3\" (1.6 m)   Wt 173 lb 8 oz (78.7 kg)   SpO2 100%   BMI 30.73 kg/m²     General appearance: Mild respiratory distress, persistent coughing  HEENT:  Normal cephalic, atraumatic without obvious deformity. Pupils equal, round, and reactive to light. Extra ocular muscles intact. Conjunctivae/corneas clear. Neck: Supple, with full range of motion. No jugular venous distention. Trachea midline. Respiratory: Scattered expiratory wheezing  Cardiovascular:  Regular rate and rhythm with normal S1/S2 without murmurs, rubs or gallops. Abdomen: Soft, non-tender, non-distended with normal bowel sounds.   Musculoskeletal:  No clubbing, cyanosis or edema bilaterally. Full range of motion without deformity. Skin: Skin color, texture, turgor normal.  No rashes or lesions. Neurologic:  Neurovascularly intact without any focal sensory/motor deficits. Cranial nerves: II-XII intact, grossly non-focal.  Psychiatric:  Alert and oriented, thought content appropriate, normal insight  Capillary Refill: Brisk,< 3 seconds   Peripheral Pulses: +2 palpable, equal bilaterally       Labs:   Recent Labs     01/24/22  1615   WBC 9.5   HGB 10.8*   HCT 33.5*        Recent Labs     01/24/22  1645      K 3.9      CO2 29   BUN 9   CREATININE <0.5*   CALCIUM 8.8     Recent Labs     01/24/22  1645   AST 34   ALT 12   BILITOT <0.2   ALKPHOS 118     No results for input(s): INR in the last 72 hours. Recent Labs     01/24/22  1645   TROPONINI <0.01       Urinalysis:      Lab Results   Component Value Date    NITRU Negative 01/24/2022    WBCUA 6-9 06/18/2021    BACTERIA Rare 06/18/2021    RBCUA 3-4 06/18/2021    BLOODU Negative 01/24/2022    SPECGRAV 1.010 01/24/2022    GLUCOSEU >=1000 01/24/2022       Radiology:   XR CHEST PORTABLE   Final Result   Bilateral airspace disease suggestive of atypical viral pneumonia   superimposed on chronic lung disease. ASSESSMENT:  Acute on chronic respiratory failure with hypoxia  Interstitial lung disease, likely flare  Community-acquired pneumonia  Hypothyroidism  Diabetes mellitus type 2  Hypertension    PLAN:  No clear evidence of pneumonia, procalcitonin is negative but given her high risk history we will cover her empirically with Levaquin. Will consult pulmonary and he could continue management. Covid is negative, a viral assay was also obtained. Has been vaccinated for Covid. -Tessalon Mucinex ordered  -Steroid therapy plus scheduled nebulizer therapy  -Sliding scale insulin therapy  -Resume home antihypertensive      DVT Prophylaxis: Lovenox  Diet: ADULT DIET;  Regular; 4 carb choices (60 gm/meal)  Code Status: Full Code    Dispo -admit to [facility] on telemetry. Thank you for the opportunity to be involved in this patient's care.       (Please note that portions of this note were completed with a voice recognition program. Efforts were made to edit the dictations but occasionally words are mis-transcribed.)

## 2022-01-25 NOTE — PROGRESS NOTES
Inpatient Physical Therapy Evaluation and Treatment    Unit: PCU  Date:  1/25/2022  Patient Name:    Jerzy Gillespie  Admitting diagnosis:  ILD (interstitial lung disease) ([facility] [J84.9]  Acute on chronic respiratory failure ([facility] [J96.20]  Acute on chronic respiratory failure with hypoxia ([facility] [J96.21]  Admit Date:  1/24/2022  Precautions/Restrictions/WB Status/ Lines/ Wounds/ Oxygen: Fall risk, Bed/chair alarm, Lines -IV and Supplemental O2 (3L/min), Telemetry, Continuous pulse oximetry and Isolation Precautions: Droplet Plus - COVID R/O    Treatment Time:0900 - 1000  Treatment Number:  1   Timed Code Treatment Minutes: 50 minutes  Total Treatment Minutes:  60  minutes    Patient Goals for Therapy: \" Go home \"          Discharge Recommendations: Home PRN assist and with home PT for lumbar stabilization exercises and ambulation tolerance  DME needs for discharge: Needs Met       Therapy recommendation for EMS Transport: can transport by wheelchair    Therapy recommendations for staff: Independent with use of No AD for all transfers and ambulation to/from [facility]  to/from chair   Supervision to walk to bathroom with RW due to drop in SPO2 levels    History of Present Illness: H & P as per Ivan Martinez MD's note dated 1/24/2022  74 y.o. female who presented to the hospital with a chief complaint of worsening shortness of breath. The patient has a history of chronic respiratory failure, on 3 to 5 L of oxygen. She has a history of interstitial lung disease and follows with Dr. Ghislaine Cook. Over the last several days she has been having issues with her breathing. She endorses a productive clear cough and worsening oxygen requirement. She has had to turn her oxygen up to 5 L of time just to maintain his saturations. Her cough is nonstop and she is getting no relief from home remedies. Yesterday she decided to show up to the emergency department. She is has been started on empiric antibiotic therapy and steroids. She will be admitted for pulmonary evaluation. Of note the patient is fully vaccinated for COVID-19 and has received two doses of the mRNA vaccine but has not received a booster just because she has been too ill to go get it. ASSESSMENT:  Acute on chronic respiratory failure with hypoxia  Interstitial lung disease, likely flare  Community-acquired pneumonia  Hypothyroidism  Diabetes mellitus type 2  Hypertension    Home Health S4 Level Recommendation:  Level 3 Safety  AM-PAC Mobility Score    AM-PAC Inpatient Mobility Raw Score : 22       Preadmission Environment    Pt. Yesica Reynoso spouse & 1 dogs (Pt works during day 4pm-1am)  Home environment:   1 story home   Steps to enter first floor: 3 Steps to enter and One Hand rail  Steps to second floor: N/A  Bathroom: Walk in [address] [facility], Grab bars, Shower Chair  and comfort height toilet without grab bars   Equipment owned: rollator, Shower Chair, lift chair, home O2 (2 L) continuous, pulse ox and nebulizer      Preadmission Status:  Pt. Able to drive: No,  and daughter drive Pt  Pt Fully independent with ADLs: No  Pt. Required assistance from family for:Pt and spouse share cooking, laundry, cleaning.  pt's spouse completes the yard work   Pt. Fully independent for transfers and gait and walked with rollator  History of falls Yes,  1 fall in last 6 months, recently holding onto couch and chair. Pt stays in chair mostly when spouse is at work in the evenings. Daughter works for everyday homecare, but may be able to provide assist at home.   Hobbies: reading/listening to audio books    Pain   No    Cognition    A&O x4   Able to follow 2 step commands    Subjective  Patient sitting EOB with no family present. Pt agreeable to this PT eval & tx. Upper Extremity ROM/Strength  Please see OT evaluation.       Lower Extremity ROM / Strength   AROM WFL: Yes  ROM limitations: N/A    Strength Assessment (measured on a 0-5 scale):  R LE   Quad   5   Ant Tib 5   Hamstring 5   Iliopsoas 5  L LE  Quad   5   Ant Tib  5   Hamstring 5   Iliopsoas 5  R abductor muscle weakness    Lower Extremity Sensation    WFL    Lower Extremity Proprioception:   WFL    Coordination and Tone  WFL    Balance  Sitting:  Normal; Independent  Comments:     Standing: Normal; Independent  Comments:     Bed Mobility   Supine to Sit:    Independent  Sit to Supine:   Independent  Rolling:   Independent  Scooting in sitting: Independent  Scooting in supine:  Independent    Transfer Training     Sit to stand:   Independent  Stand to sit: Independent  Bed to Chair:   Independent with use of No AD and gait belt    Gait gait completed as indicated below  Distance:      20 ft + 40 ft  Deviations (firm surface/linoleum):  decreased mike, increased BRY, forward flexed posture and trendelenburg gait  Assistive Device Used:    No AD and gait belt  Level of Assist:    Supervision  Comment:     Stair Training stairs completed as indicated below  # of Steps:   2  Level of Assist:  SBA  UE Support:  unilateral on right  Assistive Device:  No AD  Pattern:   non-reciprocal pattern  Comments:     Activity Tolerance   Pt completed therapy session with SOB noted with standing activities with recovery time for 2-3 min   At rest  SpO2: 96% on the 3L/min  HR: 91 bpm    After putting on shoes and socks  SpO2: 80% recovered within 1 min to 91%  HR:  92 bpm    After ambulation 20 ft  SpO2: dropped to 79% recovered to 93% after increasing O2 to 4L/min   HR: 95 bpm    After ambulation 50 ft  SpO2: dropped to 79% recovered to 94% after increasing O2 to 4L/min   HR: 92- 96 bpm    After toileting activities and lynnette care hygiene  SpO2: dropped to 84% and recovered to 92% within 2 min on 3L/min of O2  HR: 94 bpm        Positioning Needs   Pt in bed, no alarm needed, positioned in proper neutral alignment and pressure relief provided.    Call light provided and all needs within reach    Exercises Initiated  all completed bilaterally unless indicated  Ankle Pumps x 20 reps  LAQ x 10 reps  marching x 10 reps  Hip abduction: x 10 reps   Hamstring curls x 10 reps    Other  None. Patient/Family Education   Pt educated on role of inpatient PT, POC, importance of continued activity, DC recommendations, safety awareness, transfer techniques, pursed lip breathing, energy conservation, pacing activity and calling for assist with mobility. Assessment  Pt seen for Physical Therapy evaluation in acute care setting. Patient presented with forward head posture with increased kyphosis. Patient presented with trendelenburg gait during ambulation. As per patient, she had been limiting herself to walk 10-20 ft only to avoid SOB and spent time mostly sitting in the chair. Patient was educated about Outpatient PT skilled services to facilitate lumbar stabilization exercises but she doesn't have ride to go to Outpatient clinic. Patient agreed for home health Physical therapy services. Patient was given home exercise program to continue to at home. Patient was also educated about walking program at home to maintain ambulation endurance. Patient verbalized understanding. Pt demonstrated decreased Activity tolerance, Balance, ROM, Safety and Strength as well as decreased independence with Ambulation. Recommending Home PRN assist and with home PT upon discharge as patient functioning below baseline level and would benefit from continued therapy services    Goals : To be met in 3 visits:  1). Independent with LE Ex x 10 reps    To be met in 6 visits:  1). Supine to/from sit: Independent  2). Sit to/from stand: Independent  3). Bed to chair: Independent  4). Gait: Ambulate 50 ft.  with  Modified Independent and use of LRAD (least restrictive assistive device)  5). Tolerate B LE exercises 3 sets of 10-15 reps  6).   Ascend/descend 3 steps with Modified Independent with use of hand rail unilateral and LRAD (least restrictive assistive device)    Rehabilitation Potential: Good  Strengths for achieving goals include:   Pt motivated and Pt cooperative   Barriers to achieving goals include:    Weakness    Plan    To be seen 3-5 x / week  while in acute care setting for therapeutic exercises, bed mobility, transfers, progressive gait training, balance training, and family/patient education. Signature: Carlos Lowe MS PT, # [id number]    If patient discharges from this facility prior to next visit, this note will serve as the Discharge Summary.

## 2022-01-25 NOTE — CARE COORDINATION
Pt is being discharged to home. Pt is on 3L at baseline and currently on 3L at 93%. Per Media Friend RN, no needs for pt and declines HC.

## 2022-01-25 NOTE — DISCHARGE INSTR - COC
Continuity of Care Form    Patient Name: Umm Marrero   :  1955  MRN:  [id number]    Admit date:  2022  Discharge date:  2022    Code Status Order: Full Code   Advance Directives:      Admitting Physician:  Jo Ann Funez MD  PCP: Griffin Wright MD    Discharging Nurse: Roma Franco 23 Unit/Room#: /1807-77  Discharging Unit Phone Number: [phone]    Emergency Contact:   Extended Emergency Contact Information  Primary Emergency Contact: EUGENIE MODI  Address: [facility] Angélica Duran [address] [place] [address] Phone: [phone]  Mobile Phone: [phone]  Relation: Spouse  Secondary Emergency Contact: Ana Abraham, [address] Phone: [phone]  Mobile Phone: [phone]  Relation: Child    Past Surgical History:  Past Surgical History:   Procedure Laterality Date    ARM SURGERY Left 3/2/2020    LEFT ULNAR NERVE DECOMPRESSION AT THE ELBOW performed by Reginaldo Bradley MD at [address] [place] [address] 2019    EBUS WF W/ANES.  performed by Augustin Castro MD at [address] [place] [address]  2019    BRONCHOSCOPY/TRANSBRONCHIAL NEEDLE BIOPSY performed by Augustin Castro MD at [address] [place] [address]  2019    BRONCHOSCOPY/TRANSBRONCHIAL LUNG BIOPSY performed by Augustin Castro MD at [address] [place] [address]  2019    BRONCHOSCOPY ALVEOLAR LAVAGE performed by Augustin Castro MD at [address] [place] [address]  07/10/2019    BRONCHOSCOPY N/A 7/10/2019    BRONCHOSCOPY ALVEOLAR LAVAGE performed by Celi Whitaker MD at [address] [place] [address] Bilateral 2019    BRONCHOSCOPY N/A 2019    BRONCHOSCOPY ALVEOLAR LAVAGE performed by Radha Neumann MD at [address] [place] [address] N/A 2019    BRONCHOSCOPY ALVEOLAR LAVAGE performed by Guzman Kunz MD at [place] [address], TOTAL ABDOMINAL      partial       Immunization History: Immunization History   Administered Date(s) Administered    COVID-19Makayla, Primary or Immunocompromised, PF, 100mcg/0.5mL 03/25/2021, 04/22/2021    Influenza Vaccine, unspecified formulation 02/13/2016    Influenza Virus Vaccine 01/15/2016, 02/13/2016    Influenza, Quadv, adjuvanted, 65 yrs +, IM, PF (Fluad) 11/23/2020, 11/17/2021    Influenza, Triv, inactivated, subunit, adjuvanted, IM (Fluad 65 yrs and older) 11/14/2019    Pneumococcal Conjugate 13-valent (Unrkjuk11) 06/18/2019    Pneumococcal Polysaccharide (Ewiwmpssi47) 11/14/2013, 04/03/2014    Tdap (Boostrix, Adacel) 02/04/2021       Active Problems:  Patient Active Problem List   Diagnosis Code    Chest pain R07.9    HTN (hypertension) I10    Hyperlipidemia with target LDL less than 130 E78.5    Insomnia G47.00    Trochanteric bursitis of both hips M70.61, M70.62    Migraine G43.909    Syncope R55    Acute blood loss anemia D62    Fatigue R53.83    Hypothyroidism E03.9    Mild single current episode of major depressive disorder ([facility]) F32.0    Anxiety F41.9    HCAP (healthcare-associated pneumonia) J18.9    A-fib ([facility]) I48.91    Atypical pneumonia J18.9    Acute bronchitis with bronchospasm J20.9    Acute on chronic diastolic congestive heart failure ([facility]) I50.33    Class 2 obesity with body mass index (BMI) of 39.0 to 39.9 in adult E66.9, Z68.39    Abnormal chest CT G70.66    Acute diastolic heart failure ([facility]) I50.31    ILD (interstitial lung disease) ([facility]) J84.9    Acute on chronic respiratory failure with hypoxia ([facility]) J96.21    Restrictive lung disease J98.4    Abnormal CT of the chest R93.89    SOB (shortness of breath) R06.02    Acute cystitis without hematuria N30.00    Shortness of breath R06.02    Chronic respiratory failure with hypoxia ([facility]) J96.11    Cryptogenic organizing pneumonia ([facility]) J84.116    Pneumothorax of left lung after biopsy J95.811    COPD (chronic obstructive pulmonary disease) ([facility]) J44.9    Type 2 diabetes mellitus without complication ([facility]) I33.3    Hyponatremia E87.1    Numbness and tingling in left hand R20.0, R20.2    Ulnar neuropathy at elbow of left upper extremity G56.22    Chronic congestive heart failure ([facility]) I50.9    Left wrist pain M25.532    Left wrist tendinitis M77.8    GERD (gastroesophageal reflux disease) Z29.6    Toxic metabolic encephalopathy G91.2    VIRGINIE (acute kidney injury) ([facility] [address] N17.9    Lumbar radiculopathy M54.16    Acute on chronic respiratory failure ([facility]) J96.20       Isolation/Infection:   Isolation            No Isolation          Patient Infection Status       Infection Onset Added Last Indicated Last Indicated By Review Planned Expiration Resolved Resolved By    None active    Resolved    COVID-19 (Rule Out) 01/25/22 01/25/22 01/25/22 COVID-19 & Influenza Combo (Ordered)   01/25/22 Rule-Out Test Resulted    COVID-19 (Rule Out) 01/24/22 01/24/22 01/24/22 COVID-19, Rapid (Ordered)   01/24/22 Rule-Out Test Resulted    COVID-19 (Rule Out) 06/18/21 06/18/21 06/18/21 COVID-19 & Influenza Combo (Ordered)   06/18/21 Rule-Out Test Resulted            Nurse Assessment:  Last Vital Signs: /72   Pulse 93   Temp 98.3 °F (36.8 °C) (Oral)   Resp 18   Ht 5' 3\" (1.6 m)   Wt 173 lb 8 oz (78.7 kg)   SpO2 93%   BMI 30.73 kg/m²     Last documented pain score (0-10 scale): Pain Level: 4  Last Weight:   Wt Readings from Last 1 Encounters:   01/24/22 173 lb 8 oz (78.7 kg)     Mental Status:  oriented, alert, coherent, logical, thought processes intact, and able to concentrate and follow conversation    IV Access:  - None    Nursing Mobility/ADLs:  Walking   Assisted  Transfer  Independent  Bathing  Assisted  Dressing  Independent  Toileting  Independent  Feeding  Independent  Med [address]  Assisted  Med Delivery   whole    Wound Care Documentation and Therapy:        Elimination:  Continence:    Bowel: Yes  Bladder: Yes  Urinary Catheter: None   Colostomy/Ileostomy/Ileal Conduit: No       Date of Last BM: Intake/Output Summary (Last 24 hours) at 1/25/2022 1130  Last data filed at 1/25/2022 0318  Gross per 24 hour   Intake 150 ml   Output 400 ml   Net -250 ml     I/O last 3 completed shifts: In: 150 [IV Piggyback:150]  Out: 400 [Urine:400]    Safety Concerns: At Risk for Falls    Impairments/Disabilities:      None    Nutrition Therapy:  Current Nutrition Therapy:   - Oral Diet:  Carb Control 4 carbs/meal (1800kcals/day)    Routes of Feeding: Oral  Liquids: Thin Liquids  Daily Fluid Restriction: no  Last Modified Barium Swallow with Video (Video Swallowing Test): not done    Treatments at the Time of Hospital Discharge:   Respiratory Treatments: as ordered  Oxygen Therapy:  is on oxygen at 3 L/min per nasal cannula. Ventilator:    - No ventilator support    Rehab Therapies:    Weight Bearing Status/Restrictions: No weight bearing restirctions  Other Medical Equipment (for information only, NOT a DME order):  walker  Other Treatments:      Patient's personal belongings (please select all that are sent with patient):   All belongings sent with patient    RN SIGNATURE:  Electronically signed by Sonia Rivera RN on 1/25/22 at 11:32 AM EST    CASE MANAGEMENT/SOCIAL WORK SECTION    Inpatient Status Date: ***    Readmission Risk Assessment Score:  Readmission Risk              Risk of Unplanned Readmission:  17           Discharging to Facility/ Agency   Name:   Address:  Phone:  Fax:    Dialysis Facility (if applicable)   Name:  Address:  Dialysis Schedule:  Phone:  Fax:    / signature: {Esignature:[id number]}    PHYSICIAN SECTION    Prognosis: {Prognosis:7509663211}    Condition at Discharge: [address] [place] [address] Patient Condition:[id number]}    Rehab Potential (if transferring to Rehab): {Prognosis:1390818275}    Recommended Labs or Other Treatments After Discharge: ***    Physician Certification: I certify the above information and transfer of [place]  is necessary for the continuing treatment of the diagnosis listed and that she requires {Admit to Appropriate Level of Care:54032} for {GREATER/LESS:652594927} 30 days.      Update Admission H&P: {CHP DME Changes in CVKJO:400944467}    PHYSICIAN SIGNATURE:  {Esignature:[id number]}

## 2022-01-26 LAB
CULTURE, RESPIRATORY: NORMAL
GRAM STAIN RESULT: NORMAL

## 2022-02-03 ENCOUNTER — TELEMEDICINE (OUTPATIENT)
Dept: FAMILY MEDICINE CLINIC | Age: 67
End: 2022-02-03
Payer: MEDICARE

## 2022-02-03 DIAGNOSIS — J44.9 CHRONIC OBSTRUCTIVE PULMONARY DISEASE, UNSPECIFIED COPD TYPE (HCC): Primary | ICD-10-CM

## 2022-02-03 DIAGNOSIS — R53.1 WEAKNESS GENERALIZED: ICD-10-CM

## 2022-02-03 PROCEDURE — 1111F DSCHRG MED/CURRENT MED MERGE: CPT | Performed by: FAMILY MEDICINE

## 2022-02-03 PROCEDURE — 1123F ACP DISCUSS/DSCN MKR DOCD: CPT | Performed by: FAMILY MEDICINE

## 2022-02-03 PROCEDURE — 4040F PNEUMOC VAC/ADMIN/RCVD: CPT | Performed by: FAMILY MEDICINE

## 2022-02-03 PROCEDURE — 99214 OFFICE O/P EST MOD 30 MIN: CPT | Performed by: FAMILY MEDICINE

## 2022-02-03 PROCEDURE — 1090F PRES/ABSN URINE INCON ASSESS: CPT | Performed by: FAMILY MEDICINE

## 2022-02-03 PROCEDURE — G8428 CUR MEDS NOT DOCUMENT: HCPCS | Performed by: FAMILY MEDICINE

## 2022-02-03 PROCEDURE — G8400 PT W/DXA NO RESULTS DOC: HCPCS | Performed by: FAMILY MEDICINE

## 2022-02-03 PROCEDURE — 3017F COLORECTAL CA SCREEN DOC REV: CPT | Performed by: FAMILY MEDICINE

## 2022-02-03 ASSESSMENT — ENCOUNTER SYMPTOMS
SHORTNESS OF BREATH: 1
WHEEZING: 1

## 2022-02-03 NOTE — PATIENT INSTRUCTIONS
Patient Education        COPD Exacerbation Plan: Care Instructions  Overview     If you have chronic obstructive pulmonary disease (COPD), your usual shortness of breath could suddenly get worse. You may start coughing more and have more mucus. This flare-up is called a COPD exacerbation (say \"le-SIU-sl-BAY-leanne\"). A lung infection or air pollution could set off an exacerbation. Sometimes it can happen after being around chemicals. Work with your doctor to make a plan for dealing with an exacerbation. You can better manage it if you plan ahead. Follow-up care is a key part of your treatment and safety. Be sure to make and go to all appointments, and call your doctor if you are having problems. It's also a good idea to know your test results and keep a list of the medicines you take. How can you care for yourself at home? During an exacerbation  · Do not panic if you start to have one. Quick treatment may help you prevent serious breathing problems. If you have a COPD exacerbation plan that you developed with your doctor, follow it. · Take your medicines exactly as your doctor tells you.  ? Use your inhaler as directed by your doctor. If your symptoms do not get better after you use your medicine, have someone take you to the emergency room. Call an ambulance if necessary. ? With inhaled medicines, a spacer or a nebulizer may help you get more medicine to your lungs. Ask your doctor or pharmacist how to use them properly. Practice using the spacer in front of a mirror before you have an exacerbation. This may help you get the medicine into your lungs quickly. ? If your doctor has given you steroid pills, take them as directed. ? Your doctor may have given you a prescription for antibiotics, which you can fill if you need it. ? Talk to your doctor if you have any problems with your medicine. And call your doctor if you have to use your antibiotic or steroid pills.   Preventing an exacerbation  · Do not smoke. This is the most important step you can take to prevent more damage to your lungs and prevent problems. If you already smoke, it is never too late to stop. If you need help quitting, talk to your doctor about stop-smoking programs and medicines. These may increase your chances of quitting for good. · Take your daily medicines as prescribed. · Avoid colds and other infections. ? Get a flu vaccine each year, as soon as it is available. Ask those you live or work with to do the same, so they will not get the flu and infect you.  ? Get the pneumococcal and whooping cough (pertussis) vaccines. ? Try to stay away from people with colds or the flu. ? Wash your hands often. · Avoid secondhand smoke and air pollution. Try to stay inside with your windows closed when air pollution is bad. · Learn breathing techniques for COPD, such as pursed-lip breathing. These techniques may help you breathe easier during an exacerbation. When should you call for help? Call 911 anytime you think you may need emergency care. For example, call if:    · You have severe trouble breathing.     · You have severe chest pain. Call your doctor now or seek immediate medical care if:    · You have new or worse shortness of breath.     · You develop new chest pain.     · You are coughing more deeply or more often, especially if you notice more mucus or a change in the color of your mucus.     · You cough up blood.     · You have new or increased swelling in your legs or belly.     · You have a fever. Watch closely for changes in your health, and be sure to contact your doctor if:    · You need to use your antibiotic or steroid pills.     · Your symptoms are getting worse. Where can you learn more? Go to [email or web address] org and sign in to your [facility] account. Enter C303 in the [facility] box to learn more about \"COPD Exacerbation Plan: Care Instructions. \"     If you do not have an account, please click on the \"Sign Up Now\" link. Current as of: July 6, 2021               Content Version: 13.1  © 2006-2021 Healthwise, Incorporated. Care instructions adapted under license by [facility] ([facility]). If you have questions about a medical condition or this instruction, always ask your healthcare professional. [address] any warranty or liability for your use of this information.

## 2022-02-03 NOTE — PROGRESS NOTES
2/3/2022    TELEHEALTH EVALUATION -- Audio/Visual (During [id number] public health emergency)    HPI:    Kelly Medina (:  1955) has requested an audio/video evaluation for the following concern(s):    Patient recently discharged from the hospital for a COPD exacerbation. She has been weak and unable to preform ADLs. The hospital did not set up home health for her and she is afraid at home and needs help. She doesn't want her health to decline and end back up in the hospital    Review of Systems   Constitutional: Positive for fatigue. Negative for fever. Respiratory: Positive for shortness of breath and wheezing. Neurological: Positive for weakness. All other systems reviewed and are negative. Prior to Visit Medications    Medication Sig Taking?  Authorizing Provider   predniSONE (DELTASONE) 10 MG tablet 4 tabs for 3 days 3 tabs for 3 days 2 tabs for 3 days 1 tabs for 3 days  Almon Dakins, MD   benzonatate (TESSALON) 200 MG capsule TAKE 1 CAPSULE BY MOUTH 3 TIMES A DAY AS NEEDED FOR COUGH  Cleo Cool MD   empagliflozin (JARDIANCE) 25 MG tablet Take 1 tablet by mouth daily  Aury Orellana MD   SITagliptin (JANUVIA) 100 MG tablet Take 1 tablet by mouth daily  Aury Orellana MD   pantoprazole (PROTONIX) 40 MG tablet Take 1 tablet by mouth every morning (before breakfast)  Aury Orellana MD   sertraline (ZOLOFT) 100 MG tablet Take 2 tablets by mouth daily  Aury Orellana MD   mirtazapine (REMERON) 30 MG tablet Take 1 tablet by mouth nightly  Aury Orellana MD   amitriptyline (ELAVIL) 25 MG tablet TAKE 1 TABLET BY MOUTH EVERY DAY AT NIGHT  Aury Orellana MD   nadolol (CORGARD) 20 MG tablet Take 1 tablet by mouth daily  Aury Orellana MD   atorvastatin (LIPITOR) 40 MG tablet Take 1 tablet by mouth daily  Aury Orellana MD   levothyroxine (SYNTHROID) 125 MCG tablet Take 1 tablet by mouth Daily TAKE 1 TABLET BY MOUTH EVERY DAY  Aury Orellana MD   traMADol (ULTRAM) 50 MG tablet Take 1-2 tablets by mouth every 6 hours as needed for Pain for up to 180 days. Intended supply: 7 days. Take lowest dose possible to manage pain  Saul Shannon MD   loratadine-pseudoephedrine ([employer] ALLERGY RELIEF-D12) 5-120 MG per extended release tablet TAKE 1 TABLET BY MOUTH TWICE A DAY  Saul Shannon MD   albuterol sulfate HFA (PROVENTIL HFA) 108 (90 Base) MCG/ACT inhaler Inhale 2 puffs into the lungs every 6 hours as needed for Wheezing  Ella Cool MD   albuterol (PROVENTIL) (2.5 MG/3ML) 0.083% nebulizer solution INHALE THE CONTENTS OF 1 VIAL VIA NEBULIZER EVERY 6 HOURS AS NEEDED FOR WHEEZING  Saul Shannon MD   Blood Glucose Monitoring Suppl (TRUE METRIX METER) PUJA Test daily and as needed  Saul Shannon MD   blood glucose test strips (ASCENSIA AUTODISC VI;ONE TOUCH ULTRA TEST VI) strip 1 each by In Vitro route daily As needed.   Saul Shannon MD   TRUEplus Lancets 33G MISC Use daily  Saul Shannon MD   Blood Glucose Calibration (TRUE METRIX LEVEL 1) Low SOLN use as needed  Saul Shannon MD   lidocaine 4 % external patch Place 2 patches onto the skin daily  Spencer Ahumada, MD   Loratadine-Pseudoephedrine (LORATADINE-D 12HR PO) Take 1 tablet by mouth daily  Historical Provider, MD   hydrOXYzine (ATARAX) 10 MG tablet TAKE 1 TO 3 TABLETS BY MOUTH 3 TIMES DAILY AS NEEDED FOR ITCHING  Saul Shannon MD   sodium chloride (OCEAN, BABY AYR) 0.65 % nasal spray 1 spray by Nasal route as needed for Congestion  Historical Provider, MD   diclofenac sodium (VOLTAREN) 1 % GEL Apply 2 g topically 4 times daily  Ingrid Davis MD   INSULIN SYRINGE 1CC/29G 29G X 1/2\" 1 ML MISC 1 each by Does not apply route 2 times daily  Saul Shannon MD   FLUTICASONE FUROATE IN Inhale into the lungs as needed  Historical Provider, MD   Multiple Vitamins-Minerals (MULTIVITAMIN ADULT PO) Take by mouth daily  Historical Provider, MD   OXYGEN Inhale 3 L into the lungs  Historical Provider, MD       Social History Tobacco Use    Smoking status: Never Smoker    Smokeless tobacco: Never Used   Vaping Use    Vaping Use: Never used   Substance Use Topics    Alcohol use: No    Drug use: No        Allergies   Allergen Reactions    Penicillins Anaphylaxis     Tolerates Meropenem.  Cefuroxime      Tolerates Meropenem.  Doxycycline Hives    Imitrex [Sumatriptan] Other (See Comments)     Feels like pins and needles    Meperidine     Sulfa Antibiotics Hives    Keflex [Cephalexin] Itching and Rash     Tolerates Meropenem.  Tape Corinne Bicker Tape] Rash   ,   Past Medical History:   Diagnosis Date    A-fib ([facility] [address]     Anxiety     Cryptogenic organizing pneumonia ([facility] [address]     Depression     GERD (gastroesophageal reflux disease)     Hyperlipidemia     On home oxygen therapy     uses O2 3L prn    Rash     Thyroid disease     hypothyroidism   ,   Past Surgical History:   Procedure Laterality Date    ARM SURGERY Left 3/2/2020    LEFT ULNAR NERVE DECOMPRESSION AT THE ELBOW performed by Christian Muhammad MD at [address] 6/27/2019    EBUS WF W/ANES.  performed by Jamin Mak MD at [address]  6/27/2019    BRONCHOSCOPY/TRANSBRONCHIAL NEEDLE BIOPSY performed by Jamin Mak MD at [address]  6/27/2019    BRONCHOSCOPY/TRANSBRONCHIAL LUNG BIOPSY performed by Jamin Mak MD at [address]  6/27/2019    BRONCHOSCOPY ALVEOLAR LAVAGE performed by Jamin Mak MD at [address]  07/10/2019    BRONCHOSCOPY N/A 7/10/2019    BRONCHOSCOPY ALVEOLAR LAVAGE performed by Sachin Weems MD at [address] Bilateral 08/06/2019    BRONCHOSCOPY N/A 8/6/2019    BRONCHOSCOPY ALVEOLAR LAVAGE performed by Shae Nova MD at [address] N/A 12/24/2019    BRONCHOSCOPY ALVEOLAR LAVAGE [facility], TOTAL ABDOMINAL      partial   ,   Social History     Tobacco Use    Smoking status: Never Smoker    Smokeless tobacco: Never Used   Vaping Use    Vaping Use: Never used   Substance Use Topics    Alcohol use: No    Drug use: No       PHYSICAL EXAMINATION:  [ INSTRUCTIONS:  \"[x]\" Indicates a positive item  \"[]\" Indicates a negative item  -- DELETE ALL ITEMS NOT EXAMINED]  Vital Signs: (As obtained by patient/caregiver or practitioner observation)    Not available    Constitutional: [x] Appears well-developed and well-nourished [x] No apparent distress      [] Abnormal-   Mental status  [x] Alert and awake  [x] Oriented to person/place/time [x]Able to follow commands      Eyes:  EOM    [x]  Normal  [] Abnormal-  Sclera  [x]  Normal  [] Abnormal -         Discharge [x]  None visible  [] Abnormal -    HENT:   [x] Normocephalic, atraumatic. [] Abnormal   [x] Mouth/Throat: Mucous membranes are moist.     External Ears [x] Normal  [] Abnormal-     Neck: [x] No visualized mass     Pulmonary/Chest: [x] Respiratory effort normal.  [x] No visualized signs of difficulty breathing or respiratory distress        [] Abnormal-      Musculoskeletal:   [x] Normal gait with no signs of ataxia         [x] Normal range of motion of neck        [] Abnormal-         ASSESSMENT/PLAN:  1. Chronic obstructive pulmonary disease, unspecified COPD type (HCC)  Continue fluticasone and albuterol  - External Referral To Home Health    2. Weakness generalized        Return if symptoms worsen or fail to improve, for COPD. Shirley Amado, was evaluated through a synchronous (real-time) audio-video encounter. The patient (or guardian if applicable) is aware that this is a billable service, which includes applicable co-pays. This Virtual Visit was conducted with patient's (and/or legal guardian's) consent.  The visit was conducted pursuant to the emergency declaration under the [facility], [facility] waiver authority and the [employer] Act. Patient identification was verified, and a caregiver was present when appropriate. The patient was located at home in a state where the provider was licensed to provide care. Patient was at home during this virtual visit. Total time spent on this encounter: Not billed by time    --Pete Stone MD on 2/3/2022 at 3:21 PM    An electronic signature was used to authenticate this note.

## 2022-02-06 DIAGNOSIS — J98.4 RESTRICTIVE LUNG DISEASE: ICD-10-CM

## 2022-02-07 ENCOUNTER — TELEPHONE (OUTPATIENT)
Dept: FAMILY MEDICINE CLINIC | Age: 67
End: 2022-02-07

## 2022-02-07 ENCOUNTER — TELEPHONE (OUTPATIENT)
Dept: PULMONOLOGY | Age: 67
End: 2022-02-07

## 2022-02-07 RX ORDER — ALBUTEROL SULFATE 2.5 MG/3ML
SOLUTION RESPIRATORY (INHALATION)
Qty: 720 ML | Refills: 1 | Status: SHIPPED | OUTPATIENT
Start: 2022-02-07

## 2022-02-07 NOTE — TELEPHONE ENCOUNTER
Pt called stating that she finished Prednisone and Abx given in ER 1/25/22. Pt states that she is still very short of breath, having headaches and sinus issues. Do you have the following symptoms? Shortness of Breath  yes  Wheezing  Yes, a little  Cough  yes  Cough Characteristics:  Productive    yes  Sputum Color    Yellow/green  Hemoptysis   no  Consistency of sputum   thick     Fever:    no    Temp:n/a  Chills/Sweats:  no  What other symptoms are you having?:  Very fatigue, headaches, sinus congestion    How long have you had these symptoms? Ongoing for the last couple weeks     Pharmacy: [facility]    Have you been vaccinated for covid? Yes  Have you received a booster vaccine? No          Review medications and allergies: Allergies? Allergies   Allergen Reactions    Penicillins Anaphylaxis     Tolerates Meropenem.  Cefuroxime      Tolerates Meropenem.  Doxycycline Hives    Imitrex [Sumatriptan] Other (See Comments)     Feels like pins and needles    Meperidine     Sulfa Antibiotics Hives    Keflex [Cephalexin] Itching and Rash     Tolerates Meropenem.  Tape Margretta Sciara Tape] Rash             Currently on Antibiotics? (Drug/Dose/Frequency and how long on?) none        Systemic Steroids? (Drug/Dose/Frequency and how long on?) none      Last sick call taken on n/a. Meds prescribed were n/a, by n/a.     Last OV 11/2/21 with Dr. Tari Amaro   (pull in last visit note assessment/plan)   ASSESSMENT:  · Cryptogenic organizing pneumonia - in remission  · Chronic hypoxic respiratory failure -resolved  · Cough and Fever had been intermittently associated with her condition  · Restrictive lung disease  · Chronic Rhinitis with PND   · Obesity  · Afib     PLAN:   · PRN tessalon pearles  · Her condition was been steroid responsive  · Completed Prednisone 40mg for 4-6 weeks, 30mg for 1 month, 20mg for 1 month, 15mg for 6 weeks, 10mg for 6 weeks, 5mg for 8 weeks, 2.5mg daily for 2 weeks and  2.5mg QOD for 4 weeks.  Stopped Prednisone 5/2020  · Continue sinus regimen: flonase and Claritin   · Continue Lasix  · PRN albuterol and albuterol nebs  · Mold remediated  · F/u in 12 months or PRN

## 2022-02-08 ENCOUNTER — HOSPITAL ENCOUNTER (EMERGENCY)
Age: 67
Discharge: HOME | End: 2022-02-09
Attending: EMERGENCY MEDICINE
Payer: MEDICARE

## 2022-02-08 ENCOUNTER — TELEPHONE (OUTPATIENT)
Dept: OTHER | Facility: CLINIC | Age: 67
End: 2022-02-08

## 2022-02-08 ENCOUNTER — APPOINTMENT (OUTPATIENT)
Dept: CT IMAGING | Age: 67
End: 2022-02-08
Payer: MEDICARE

## 2022-02-08 ENCOUNTER — APPOINTMENT (OUTPATIENT)
Dept: GENERAL RADIOLOGY | Age: 67
End: 2022-02-08
Payer: MEDICARE

## 2022-02-08 DIAGNOSIS — J40 BRONCHITIS: Primary | ICD-10-CM

## 2022-02-08 LAB
A/G RATIO: 1.1 (ref 1.1–2.2)
ALBUMIN SERPL-MCNC: 3.5 G/DL (ref 3.4–5)
ALP BLD-CCNC: 101 U/L (ref 40–129)
ALT SERPL-CCNC: 14 U/L (ref 10–40)
ANION GAP SERPL CALCULATED.3IONS-SCNC: 12 MMOL/L (ref 3–16)
AST SERPL-CCNC: 27 U/L (ref 15–37)
BASOPHILS ABSOLUTE: 0.1 K/UL (ref 0–0.2)
BASOPHILS RELATIVE PERCENT: 1 %
BILIRUB SERPL-MCNC: 0.4 MG/DL (ref 0–1)
BUN BLDV-MCNC: 6 MG/DL (ref 7–20)
CALCIUM SERPL-MCNC: 8.7 MG/DL (ref 8.3–10.6)
CHLORIDE BLD-SCNC: 103 MMOL/L (ref 99–110)
CO2: 25 MMOL/L (ref 21–32)
CREAT SERPL-MCNC: <0.5 MG/DL (ref 0.6–1.2)
EOSINOPHILS ABSOLUTE: 1.3 K/UL (ref 0–0.6)
EOSINOPHILS RELATIVE PERCENT: 9.7 %
GFR AFRICAN AMERICAN: >60
GFR NON-AFRICAN AMERICAN: >60
GLUCOSE BLD-MCNC: 147 MG/DL (ref 70–99)
GLUCOSE BLD-MCNC: 155 MG/DL (ref 70–99)
HCT VFR BLD CALC: 33 % (ref 36–48)
HEMOGLOBIN: 10.5 G/DL (ref 12–16)
LYMPHOCYTES ABSOLUTE: 1.8 K/UL (ref 1–5.1)
LYMPHOCYTES RELATIVE PERCENT: 13.8 %
MCH RBC QN AUTO: 23.9 PG (ref 26–34)
MCHC RBC AUTO-ENTMCNC: 31.7 G/DL (ref 31–36)
MCV RBC AUTO: 75.2 FL (ref 80–100)
MONOCYTES ABSOLUTE: 0.6 K/UL (ref 0–1.3)
MONOCYTES RELATIVE PERCENT: 4.6 %
NEUTROPHILS ABSOLUTE: 9.5 K/UL (ref 1.7–7.7)
NEUTROPHILS RELATIVE PERCENT: 70.9 %
PDW BLD-RTO: 18.4 % (ref 12.4–15.4)
PERFORMED ON: ABNORMAL
PLATELET # BLD: 213 K/UL (ref 135–450)
PMV BLD AUTO: 7.2 FL (ref 5–10.5)
POTASSIUM REFLEX MAGNESIUM: 4 MMOL/L (ref 3.5–5.1)
PRO-BNP: 1005 PG/ML (ref 0–124)
RBC # BLD: 4.39 M/UL (ref 4–5.2)
SODIUM BLD-SCNC: 140 MMOL/L (ref 136–145)
TOTAL PROTEIN: 6.6 G/DL (ref 6.4–8.2)
WBC # BLD: 13.4 K/UL (ref 4–11)

## 2022-02-08 PROCEDURE — 85025 COMPLETE CBC W/AUTO DIFF WBC: CPT

## 2022-02-08 PROCEDURE — 83880 ASSAY OF NATRIURETIC PEPTIDE: CPT

## 2022-02-08 PROCEDURE — 2580000003 HC RX 258: Performed by: EMERGENCY MEDICINE

## 2022-02-08 PROCEDURE — 6360000004 HC RX CONTRAST MEDICATION: Performed by: EMERGENCY MEDICINE

## 2022-02-08 PROCEDURE — 80053 COMPREHEN METABOLIC PANEL: CPT

## 2022-02-08 PROCEDURE — 99285 EMERGENCY DEPT VISIT HI MDM: CPT

## 2022-02-08 PROCEDURE — 36415 COLL VENOUS BLD VENIPUNCTURE: CPT

## 2022-02-08 PROCEDURE — 93005 ELECTROCARDIOGRAM TRACING: CPT | Performed by: EMERGENCY MEDICINE

## 2022-02-08 PROCEDURE — 71260 CT THORAX DX C+: CPT

## 2022-02-08 PROCEDURE — 71045 X-RAY EXAM CHEST 1 VIEW: CPT

## 2022-02-08 RX ORDER — IPRATROPIUM BROMIDE AND ALBUTEROL SULFATE 2.5; .5 MG/3ML; MG/3ML
1 SOLUTION RESPIRATORY (INHALATION) ONCE
Status: COMPLETED | OUTPATIENT
Start: 2022-02-08 | End: 2022-02-09

## 2022-02-08 RX ORDER — 0.9 % SODIUM CHLORIDE 0.9 %
1000 INTRAVENOUS SOLUTION INTRAVENOUS ONCE
Status: COMPLETED | OUTPATIENT
Start: 2022-02-08 | End: 2022-02-09

## 2022-02-08 RX ADMIN — IOPAMIDOL 75 ML: 755 INJECTION, SOLUTION INTRAVENOUS at 23:41

## 2022-02-08 RX ADMIN — SODIUM CHLORIDE 1000 ML: 9 INJECTION, SOLUTION INTRAVENOUS at 22:30

## 2022-02-09 ENCOUNTER — TELEPHONE (OUTPATIENT)
Dept: OTHER | Facility: CLINIC | Age: 67
End: 2022-02-09

## 2022-02-09 VITALS
OXYGEN SATURATION: 95 % | BODY MASS INDEX: 30.12 KG/M2 | HEART RATE: 95 BPM | TEMPERATURE: 98.3 F | DIASTOLIC BLOOD PRESSURE: 60 MMHG | WEIGHT: 170 LBS | SYSTOLIC BLOOD PRESSURE: 120 MMHG | RESPIRATION RATE: 14 BRPM | HEIGHT: 63 IN

## 2022-02-09 LAB
EKG ATRIAL RATE: 98 BPM
EKG DIAGNOSIS: NORMAL
EKG P AXIS: 16 DEGREES
EKG P-R INTERVAL: 146 MS
EKG Q-T INTERVAL: 358 MS
EKG QRS DURATION: 74 MS
EKG QTC CALCULATION (BAZETT): 457 MS
EKG R AXIS: -1 DEGREES
EKG T AXIS: -13 DEGREES
EKG VENTRICULAR RATE: 98 BPM

## 2022-02-09 PROCEDURE — 6370000000 HC RX 637 (ALT 250 FOR IP): Performed by: EMERGENCY MEDICINE

## 2022-02-09 PROCEDURE — 93010 ELECTROCARDIOGRAM REPORT: CPT | Performed by: INTERNAL MEDICINE

## 2022-02-09 RX ORDER — LEVOFLOXACIN 500 MG/1
500 TABLET, FILM COATED ORAL DAILY
Qty: 7 TABLET | Refills: 0 | Status: SHIPPED | OUTPATIENT
Start: 2022-02-09 | End: 2022-02-16

## 2022-02-09 RX ORDER — PREDNISONE 50 MG/1
50 TABLET ORAL DAILY
Qty: 5 TABLET | Refills: 0 | Status: SHIPPED | OUTPATIENT
Start: 2022-02-09 | End: 2022-02-14

## 2022-02-09 RX ADMIN — PREDNISONE 50 MG: 20 TABLET ORAL at 00:45

## 2022-02-09 RX ADMIN — IPRATROPIUM BROMIDE AND ALBUTEROL SULFATE 1 AMPULE: .5; 2.5 SOLUTION RESPIRATORY (INHALATION) at 00:45

## 2022-02-09 NOTE — TELEPHONE ENCOUNTER
Nurse Access contacted pt regarding ED follow up appt. Spoke with pt, she stated she has appt scheduled with Dr. Juancarlos Dailey. Per notes Pulmonology appt scheduled.

## 2022-02-09 NOTE — TELEPHONE ENCOUNTER
Writer contacted Dr. Johnnie Loera to inform of 30 day readmission risk. Dr. Johnnie Loera informed writer there is no decision on disposition at this time.

## 2022-02-09 NOTE — ED PROVIDER NOTES
Emergency Department Attending Note    Jing Carrera MD    Date of ED VIsit: 2/8/2022    CHIEF COMPLAINT  Shortness of Breath (pt brought in by EMS. EMS states they were called for SOB, per EMS pt normally wears 3L NC was on 5L NC oxygen reading 80%. EMS increased pt to 15L and gave breathing treatment, pt is 95% on 3L NC upon arrival to ED. )      [address] [facility] Ricardo Nicole is a 69 y.o. female  With Vital signs of BP (!) 121/58   Pulse 102   Temp 98.3 °F (36.8 °C) (Oral)   Resp 14   Ht 5' 3\" (1.6 m)   Wt 170 lb (77.1 kg)   SpO2 94%   BMI 30.11 kg/m²  who presents to the ED with a complaint of shortness of breath. Patient seen and evaluated in room 2. Patient's had 2 bouts of shortness of breath today one earlier today that was not as intense as the one this afternoon this evening that brought her into the emergency department. Patient has a history of cryptococcal pneumonia for which she sees a pulmonologist and normally brings on her shortness of breath. Admits kind of feels very similar to that. She is a non-smoker lifelong. She has no chest pain with this no fever no chills no urinary complaints no bowel complaints. Shortness of breath this afternoon into this evening progressively worse prompting her to come into the emergency department for evaluation. .  No other complaints, modifying factors or associated symptoms.     Patients Past medical history reviewed and listed below  Past Medical History:   Diagnosis Date    [facility]     Anxiety     Cryptogenic organizing pneumonia ([facility] [facility]     Depression     GERD (gastroesophageal reflux disease)     Hyperlipidemia     On home oxygen therapy     uses O2 3L prn    Rash     Thyroid disease     hypothyroidism     Past Surgical History:   Procedure Laterality Date    ARM SURGERY Left 3/2/2020    LEFT ULNAR NERVE DECOMPRESSION AT THE ELBOW performed by Radha Ace MD at [address] N/A 6/27/2019    [facility] W/ANES. performed by Meeta Rowe MD at [address]  6/27/2019    BRONCHOSCOPY/TRANSBRONCHIAL NEEDLE BIOPSY performed by Meeta Rowe MD at [address]  6/27/2019    BRONCHOSCOPY/TRANSBRONCHIAL LUNG BIOPSY performed by Meeta Rowe MD at [address]  6/27/2019    BRONCHOSCOPY ALVEOLAR LAVAGE performed by Meeta Rowe MD at [address]  07/10/2019    BRONCHOSCOPY N/A 7/10/2019    BRONCHOSCOPY ALVEOLAR LAVAGE performed by Marquis Raeann MD at [address]  Bilateral 08/06/2019    BRONCHOSCOPY N/A 8/6/2019    BRONCHOSCOPY ALVEOLAR LAVAGE performed by Joelle Galvan MD at [address]  N/A 12/24/2019    BRONCHOSCOPY ALVEOLAR LAVAGE performed by Ashley Villarreal MD at [address] [place] [address], TOTAL ABDOMINAL      partial       I have reviewed the following from the nursing documentation.     Family History   Problem Relation Age of Onset    Diabetes Mother     High Blood Pressure Mother     Asthma Sister     Other Father      Social History     Socioeconomic History    Marital status:      Spouse name: Not on file    Number of children: 9    Years of education: Not on file    Highest education level: Not on file   Occupational History    Not on file   Tobacco Use    Smoking status: Never Smoker    Smokeless tobacco: Never Used   Vaping Use    Vaping Use: Never used   Substance and Sexual Activity    Alcohol use: No    Drug use: No    Sexual activity: Not Currently   Other Topics Concern    Not on file   Social History Narrative    Not on file     Social Determinants of Health     Financial Resource Strain:     Difficulty of Paying Living Expenses: Not on file   Food Insecurity:     Worried About Running Out of Food in the Last Year: Not on file    Miguel of Food in the Last Year: Not on file   Transportation Needs:  Lack of Transportation (Medical): Not on file    Lack of Transportation (Non-Medical): Not on file   Physical Activity:     Days of Exercise per Week: Not on file    Minutes of Exercise per Session: Not on file   Stress:     Feeling of Stress : Not on file   Social Connections:     Frequency of Communication with Friends and Family: Not on file    Frequency of Social Gatherings with Friends and Family: Not on file    Attends Adventism Services: Not on file    Active Member of [address] [place] [address] or Organizations: Not on file    Attends Club or Organization Meetings: Not on file    Marital Status: Not on file   Intimate Partner Violence:     Fear of Current or Ex-Partner: Not on file    Emotionally Abused: Not on file    Physically Abused: Not on file    Sexually Abused: Not on file   Housing Stability:     Unable to Pay for Housing in the Last Year: Not on file    Number of [place] in the Last Year: Not on file    Unstable Housing in the Last Year: Not on file     No current facility-administered medications for this encounter.      Current Outpatient Medications   Medication Sig Dispense Refill    albuterol (PROVENTIL) (2.5 MG/3ML) 0.083% nebulizer solution INHALE THE CONTENTS OF 1 VIAL VIA NEBULIZER EVERY 6 HOURS AS NEEDED FOR WHEEZING 720 mL 1    predniSONE (DELTASONE) 10 MG tablet 4 tabs for 3 days 3 tabs for 3 days 2 tabs for 3 days 1 tabs for 3 days 30 tablet 0    benzonatate (TESSALON) 200 MG capsule TAKE 1 CAPSULE BY MOUTH 3 TIMES A DAY AS NEEDED FOR COUGH 90 capsule 5    empagliflozin (JARDIANCE) 25 MG tablet Take 1 tablet by mouth daily 90 tablet 1    SITagliptin (JANUVIA) 100 MG tablet Take 1 tablet by mouth daily 90 tablet 1    pantoprazole (PROTONIX) 40 MG tablet Take 1 tablet by mouth every morning (before breakfast) 90 tablet 1    sertraline (ZOLOFT) 100 MG tablet Take 2 tablets by mouth daily 180 tablet 1    mirtazapine (REMERON) 30 MG tablet Take 1 tablet by mouth nightly 90 tablet 1    amitriptyline (ELAVIL) 25 MG tablet TAKE 1 TABLET BY MOUTH EVERY DAY AT NIGHT 90 tablet 1    nadolol (CORGARD) 20 MG tablet Take 1 tablet by mouth daily 90 tablet 1    atorvastatin (LIPITOR) 40 MG tablet Take 1 tablet by mouth daily 90 tablet 0    levothyroxine (SYNTHROID) 125 MCG tablet Take 1 tablet by mouth Daily TAKE 1 TABLET BY MOUTH EVERY DAY 90 tablet 1    traMADol (ULTRAM) 50 MG tablet Take 1-2 tablets by mouth every 6 hours as needed for Pain for up to 180 days. Intended supply: 7 days. Take lowest dose possible to manage pain 120 tablet 5    loratadine-pseudoephedrine ([employer] ALLERGY RELIEF-D12) 5-120 MG per extended release tablet TAKE 1 TABLET BY MOUTH TWICE A DAY 60 tablet 5    albuterol sulfate HFA (PROVENTIL HFA) 108 (90 Base) MCG/ACT inhaler Inhale 2 puffs into the lungs every 6 hours as needed for Wheezing 3 each 1    Blood Glucose Monitoring Suppl (TRUE METRIX METER) PUJA Test daily and as needed 1 each 0    blood glucose test strips (ASCENSIA AUTODISC VI;ONE TOUCH ULTRA TEST VI) strip 1 each by In Vitro route daily As needed.  100 each 3    TRUEplus Lancets 33G MISC Use daily 100 each 3    Blood Glucose Calibration (TRUE METRIX LEVEL 1) Low SOLN use as needed 1 each 2    lidocaine 4 % external patch Place 2 patches onto the skin daily 60 patch 1    Loratadine-Pseudoephedrine (LORATADINE-D 12HR PO) Take 1 tablet by mouth daily      hydrOXYzine (ATARAX) 10 MG tablet TAKE 1 TO 3 TABLETS BY MOUTH 3 TIMES DAILY AS NEEDED FOR ITCHING 90 tablet 1    sodium chloride (OCEAN, BABY AYR) 0.65 % nasal spray 1 spray by Nasal route as needed for Congestion      diclofenac sodium (VOLTAREN) 1 % GEL Apply 2 g topically 4 times daily 2 Tube 1    INSULIN SYRINGE 1CC/29G 29G X 1/2\" 1 ML MISC 1 each by Does not apply route 2 times daily 100 each 5    FLUTICASONE FUROATE IN Inhale into the lungs as needed      Multiple Vitamins-Minerals (MULTIVITAMIN ADULT PO) Take by mouth daily      OXYGEN pulmonologist.  She was also advised that if she got short of breath she can certainly come back to the emergency department for recheck    ED Course as of 02/09/22 0033 Tue Feb 08, 2022 2158 EKG: Indication: Shortness of breath, it shows a rhythm of normal sinus rhythm at a rate of 98, with no acute ST-Twave changes to indicate ischemia except for T wave inversions inferiorly. Intervals are normal and the axis is normal. This  interpreted by me without a cardiologist.  [DL]   2211 POC Glucose(!): 147 [DL]   2211 CBC Auto Differential(!):    WBC 13.4(!)   RBC 4.39   Hemoglobin Quant 10.5(!)   Hematocrit 33.0(!)   MCV 75.2(!)   MCH 23.9(!)   MCHC 31.7   RDW 18.4(!)   Platelet Count 367   MPV 7.2   Neutrophils % 70.9   Lymphocyte % 13.8   Monocytes % 4.6   Eosinophils % 9.7   Basophils % 1.0   Neutrophils Absolute 9.5(!)   Lymphocytes Absolute 1.8   Monocytes Absolute 0.6   Eosinophils Absolute 1.3(!)   Basophils Absolute 0.1  CBC reveals a white count of 13.4 H&H of 10 and 33 with an MCV of 75 indicating a significant microcytic anemia. [DL]   9612 Brain Natriuretic Peptide(!):    Pro-BNP 1,005(!)  BNP 1005 which is slightly higher than her normal BNP [DL]   2234 Comprehensive Metabolic Panel w/ Reflex to MG(!):    Sodium 140   Potassium 4.0   Chloride 103   CO2 25   Anion Gap 12   Glucose 155(!)   BUN 6(!)   Creatinine <0.5(!)   GFR Non- >60   GFR  >60   CALCIUM, SERUM, 158296 8.7   Total Protein 6.6   Albumin 3.5   Albumin/Globulin Ratio 1.1   Bilirubin 0.4   Alk Phos 101   ALT 14   AST 27  Comprehensive metabolic panel was normal except for glucose of 155 BUN creatinine of 6 and 0.5 with a GFR of greater than 60 [DL]   2234 XR CHEST PORTABLE  FINDINGS:  The cardiac and mediastinal contours appear within normal limits. Similar  appearance of bilateral airspace disease.   The left lung apex is obscured,  however there is no evidence for pneumothorax on this exam.  No acute osseous  abnormality identified.     IMPRESSION:  No significant interval change in bilateral airspace disease [DL]   Wed Feb 09, 2022   0009 CT CHEST PULMONARY EMBOLISM W CONTRAST  IMPRESSION:  No evidence of a pulmonary embolus.     Mildly dilated and atherosclerotic thoracic aorta with no aneurysm or  dissection.     Moderate subpleural interstitial and ground-glass opacities scattered  throughout both lungs which is more prominent and could represent worsening  multisegmental bronchopneumonia vs progressive fibrosis and scarring. This  pattern of pneumonia has been described with COVID-19 disease. Recommend  correlating with lab values.     Bronchiectasis and bullous emphysematous changes throughout both lungs which  is more prominent and is suggestive of progressive pulmonary fibrosis and  scarring. Recommend pulmonology follow-up.     Mild mediastinal adenopathy which is unchanged. [DL]      ED Course User Index  [DL] Michael Peguero MD       The ED course and plan were reviewed and results discussed with the patient    The patient understood and agreed with the Discharge/transfer planning.     CLINICAL IMPRESSION and DISPOSITION    Karolyn Duran was stable and diagnosed with shortness of breath resolved    Patient was treated with DuoNeb and prednisone          Michael Peguero MD  02/09/22 [id number]

## 2022-02-14 ENCOUNTER — TELEPHONE (OUTPATIENT)
Dept: PULMONOLOGY | Age: 67
End: 2022-02-14

## 2022-02-14 ENCOUNTER — VIRTUAL VISIT (OUTPATIENT)
Dept: PULMONOLOGY | Age: 67
End: 2022-02-14
Payer: MEDICARE

## 2022-02-14 DIAGNOSIS — J96.11 CHRONIC HYPOXEMIC RESPIRATORY FAILURE (HCC): Primary | ICD-10-CM

## 2022-02-14 DIAGNOSIS — J84.116 CRYPTOGENIC ORGANIZING PNEUMONIA (HCC): ICD-10-CM

## 2022-02-14 DIAGNOSIS — J84.9 ILD (INTERSTITIAL LUNG DISEASE) (HCC): ICD-10-CM

## 2022-02-14 PROCEDURE — 99443 PR PHYS/QHP TELEPHONE EVALUATION 21-30 MIN: CPT | Performed by: INTERNAL MEDICINE

## 2022-02-14 RX ORDER — PREDNISONE 1 MG/1
TABLET ORAL
Qty: 53 TABLET | Refills: 0 | Status: SHIPPED | OUTPATIENT
Start: 2022-02-14 | End: 2022-05-06

## 2022-02-14 NOTE — TELEPHONE ENCOUNTER

## 2022-02-14 NOTE — PROGRESS NOTES
Edil Morales is a 69 y.o. female evaluated via telephone on 2/14/2022. She and/or health care decision maker is aware that that she may receive a bill for this telephone service, which includes applicable co-pays, depending on her insurance coverage, and has provided verbal consent to proceed. I affirm this is a Patient Initiated Episode with a Patient who has not had a related appointment within my department in the past 7 days or scheduled within the next 24 hours. Patient identification was verified at the start of the visit: Yes  Total Time: minutes: 21-30 minutes  Edil Morales was evaluated through a synchronous (real-time) audio encounter. The patient was located at home in a state where the provider was licensed to provide care. [facility]    Outpatient Follow Up Note    Chief Complaint: Hospital follow-up and SOB  Consulting provider: Dr Neida Pat    Interval History: 69 y.o. female No fever or cough. Was admitted 1 night 3 weeks ago. Then in the ED last week and sent home. Both for episodes of SOB and hypoxia at home that resolved with steroids. Using her inhaler or nebulizer as prescribed. Prior HPI:  admitted to the hospital for the ?4-5th time with similar presentations of SOB and Hypoxia and bilateral lung infiltrates. Bronchoscopy was again negative for infection. A right heart cath that was normal and then she had a subsequent high resolution CT scan while she was known to be euvolemic that showed persistent abnromalities. It seems to be most consistent with an ILD process. Possibly  or chronic HP or NSIP. Pt notes mold in a back room of her house. She stays out of this room. She is planning on having this remediated. Initial HPI:regarding abnormal PFTs, cough, congestion. The pt developed SOB and cough with green sputum, chest congestion, nasal congestion and sinus pressure since April.  Her symptoms intermittently improved with symptomatic treatments and nasal spray. Her SOB was better with her inhaler. Overall while her symptoms have waxed and waned they have not fully resolved. The patient does not have SOB at rest but has LEE. Exercise tolerance on level ground is 1 block. Stairs. The patient has a  daily intermittent cough that is usually dry or sometimes productive of green sputum. The patient does wheeze intermittently.   Reports waking up feeling up gagging and having missed a breath.     The pt was seen by Dr Tarah Ng at [facility] and was thought to have some sinus disease contributing to her cough and she also had PFTs to work up a pulmonary cause that were inconclusive.     Never smoker  Environmental/chemical exposure: Asbestos exposure: no    Patient worked as home health aid    Current Outpatient Medications:     predniSONE (DELTASONE) 50 MG tablet, Take 1 tablet by mouth daily for 5 days, Disp: 5 tablet, Rfl: 0    levoFLOXacin (LEVAQUIN) 500 MG tablet, Take 1 tablet by mouth daily for 7 days, Disp: 7 tablet, Rfl: 0    albuterol (PROVENTIL) (2.5 MG/3ML) 0.083% nebulizer solution, INHALE THE CONTENTS OF 1 VIAL VIA NEBULIZER EVERY 6 HOURS AS NEEDED FOR WHEEZING, Disp: 720 mL, Rfl: 1    benzonatate (TESSALON) 200 MG capsule, TAKE 1 CAPSULE BY MOUTH 3 TIMES A DAY AS NEEDED FOR COUGH, Disp: 90 capsule, Rfl: 5    empagliflozin (JARDIANCE) 25 MG tablet, Take 1 tablet by mouth daily, Disp: 90 tablet, Rfl: 1    SITagliptin (JANUVIA) 100 MG tablet, Take 1 tablet by mouth daily, Disp: 90 tablet, Rfl: 1    pantoprazole (PROTONIX) 40 MG tablet, Take 1 tablet by mouth every morning (before breakfast), Disp: 90 tablet, Rfl: 1    sertraline (ZOLOFT) 100 MG tablet, Take 2 tablets by mouth daily, Disp: 180 tablet, Rfl: 1    mirtazapine (REMERON) 30 MG tablet, Take 1 tablet by mouth nightly, Disp: 90 tablet, Rfl: 1    nadolol (CORGARD) 20 MG tablet, Take 1 tablet by mouth daily, Disp: 90 tablet, Rfl: 1    atorvastatin (LIPITOR) 40 MG daily, Disp: , Rfl:     OXYGEN, Inhale 3 L into the lungs, Disp: , Rfl:     amitriptyline (ELAVIL) 25 MG tablet, TAKE 1 TABLET BY MOUTH EVERY DAY AT NIGHT (Patient not taking: Reported on 2/14/2022), Disp: 90 tablet, Rfl: 1    Objective:   PHYSICAL EXAM: There were no vitals taken for this visit. on 3L  tele    LABS:  Reviewed any pertinent new labs that are available. 2/6/19 Immunocap , IGE 9, CBC WNL except EOs 0.9:  Bronchoscopy 6/28/2019   left upper lobe, transbronchial biopsies:  - Fragments of benign bronchial wall. - Negative for granulomas or other significant inflammation.  - Negative for malignancy. A. Right Paratracheal Lymph Node, Transbronchial Fine Needle Aspirate:       -  No malignant cells identified. B. Subcarinal Lymph Node, Transbronchial Fine Needle Aspirate:       -  No malignant cells identified. C. Lung, Left Upper Lobe, Bronchoalveolar Lavage:       -  No malignant cells identified. D. Lung, Bronchial Washings:       -  No malignant cells identified.     Subcarinal TB NA no phenotypically abnormal cells  AFB negative  Eosinophils 6%     CRP 56.9  AXEL negative  ANCA neg  HP panel neg    8/9/19 LHC/RHC: no CAD, PCWP 7, PA mean 19    8/21/19 Cryobiopsy at [facility]:  A.   Left lung, upper lobe, cryobiopsy:  - Alveolated lung parenchyma with organizing pneumonia pattern of injury. - Interstitial expansion by chronic inflammation and fibrosis with rare  ill-formed clusters of epithelioid histiocytes. - Negative for well-formed granulomas, giant cells, polarizable material,  atypia, and malignancy. B.   Left lung, lingula, cryobiopsy:  - Predominantly peribronchiolar lung parenchyma with interstitial fibrosis,  chronic inflammation and organizing pneumonia pattern of injury.  - Fragment of benign pleura. - Immunostains for CMV, HSV (I and II), special stains for fungal elements  (GMS) and bacterial organisms (Gram stain) are all negative.   - Insitu hybridization for adenovirus is negative. - Negative for granulomas, giant cells, atypia, and malignancy. C.   Left lung, lower lobe, cryobiopsy:  - Alveolated lung parenchyma with organizing pneumonia pattern of injury. - Interstitial expansion by chronic inflammation and fibrosis with rare  ill-formed clusters of epithelioid histiocytes. - Negative for well-formed  granulomas, giant cells, atypia, and  malignancy. Comment: History of COPD and imaging findings of progressive diffuse ground glass opacities with traction bronchiectasis with differential diagnosis of organizing pneumonia is noted. The current biopsy specimen reveals a predominant pattern of organizing  pneumonia (upper and lower lobes > lingula). Given the history of multiple  recurrent episodes of pneumonia requiring hostpitalization, the morphologic  findings best fit a resolving acute lung injury, with organizing pneumonia  and reactive pneumocyte proliferation. The differential diagnosis for this  pattern of injury  include infectious etiology (immunostain for HSV, CMV,  insitu hybridization for adenovirus and special stains for bacterial and  fungal organisms are all negative),residuum of prior acute  bronchopneumonia, toxic exposure, connective tissue disease or drug related  lung injury which when excluded, a cryptogenic organizing pneumonia may be  considered. However, in addition to an organizing pneumonia pattern of  injury (predominant pattern within this specimen), there is a component of  chronic interstitial fibroinflammatory process comprising lymphocytes,  plasma cells and histiocytes with occasional foci of interstitial poorly  formed aggregates of epithelioid histiocytes. These findings raise concern  of an underlying chronic fibro-inflammatory injury the differential  diagnosis includes chronic hypersensitivity pneumonitis or NSIP.  A treated  vasculitis and treated eosinophilic pneumonia are in the differential and  cannot be entirely excluded. Final Diagnosis performed by  Piper Cruz MD    Second opinion on path from [facility] of [place] canned into the chart    12/24/19 BAL neg    PFTs 9/24/18 at [facility] were inconclusive due to excessive variability in her results. There was not an obstructive ratio however. 3/1/19   FVC  (62%) FEV1 1.51 (64%) FEV1/FVC ratio 0.79   TLC  (66%)  RV  (%)   DLCO (43 corrects to 86%)  ERV 34%   MCT Negative    RHC done 8/2019 that showed a normal wedge pressure excluding pulmonary edema as a cause of her pulmonary disease    IMAGING:  I personally reviewed and interpreted the following imaging today in the office:   3/15/19 HRCT:   Mild mosaic attenuation throughout the lungs on expiratory images, compatible   with small airways disease or air trapping.       There are few subpleural irregular opacities throughout the lungs   bilaterally, in the apices, and in the lower lobes.  Changes appears similar   compared to prior, and could be due to scarring or mild nonspecific fibrosis.       Mild fusiform bronchiectasis right lower lobe, and traction bronchiectasis   right middle lobe.       Scattered areas of bronchial wall thickening are seen     6/16/19 Chest CT:   Moderate multifocal airspace opacity involving all segments of both   lungs-moderately worse over the past 2 weeks.  Multifocal pneumonia is   favored.  Asymmetric pulmonary edema superimposed upon COPD possible but less   likely.  Atypical pneumonia might be considered clinically. 8/9/19 hrct     Pulmonary parenchymal findings most compatible with organizing pneumonia. Since 03/08/2019, there has been progressive ground-glass abnormality with   traction bronchiolectasis.       Unchanged mediastinal lymphadenopathy, presumably reactive. 11/7/19 CTPA:   1. No evidence of acute pulmonary embolism or acute aortic dissection or   aneurysm.    2. Chronic changes of emphysema as well as interstitial lung disease and mild   bronchiectatic changes.  No active pleural disease. 3. Redemonstration of borderline left hilar lymphadenopathy. Chest CT 1/12/21:   Lungs/pleura: The central airways are patent. Scattered areas of ground-glass   type opacity with associated architectural distortion and irregular   subpleural opacities are not significantly changed when compared to the most   recent exam. Berniece Notch scattered areas of traction bronchiectasis are similar   to the previous.  When compared to the more remote exam there is overall   improvement of ground-glass opacity which may well have been related to   superimposed infiltrate. 6/18/2021 Chest CT:   Stable fibrotic changes throughout the lungs with stable small mediastinal   nodes. Cirrhotic appearing liver       ASSESSMENT:  · Cryptogenic organizing pneumonia - in remission  · Chronic hypoxic respiratory failure -resolved  · Cough and Fever had been intermittently associated with her condition  · Restrictive lung disease  · Chronic Rhinitis with PND   · Obesity  · Afib     PLAN:   · PRN tessalon pearles  · Her condition was been steroid responsive  · In the past she completed Prednisone 40mg for 4-6 weeks, 30mg for 1 month, 20mg for 1 month, 15mg for 6 weeks, 10mg for 6 weeks, 5mg for 8 weeks, 2.5mg daily for 2 weeks and  2.5mg QOD for 4 weeks. Stopped Prednisone 5/2020  · Continue sinus regimen: flonase and Claritin   · Continue Lasix  · PRN albuterol and albuterol nebs  · Mold remediated  · Start Prednisone 20mg daily for 1 week, 10mg for 1 week,  5mg for 1 week, 2.5mg daily for 1 week and then follow up.

## 2022-02-23 ENCOUNTER — TELEPHONE (OUTPATIENT)
Dept: PULMONOLOGY | Age: 67
End: 2022-02-23

## 2022-02-23 DIAGNOSIS — J84.9 ILD (INTERSTITIAL LUNG DISEASE) (HCC): Primary | ICD-10-CM

## 2022-02-23 NOTE — TELEPHONE ENCOUNTER
Pt calling requesting an order for a nebulizer to be sent to [facility] states the one she has is not working properly. Okay for order? LOV: 2/14/22    ASSESSMENT:  · Cryptogenic organizing pneumonia - in remission  · Chronic hypoxic respiratory failure -resolved  · Cough and Fever had been intermittently associated with her condition  · Restrictive lung disease  · Chronic Rhinitis with PND   · Obesity  · Afib     PLAN:   · PRN tespily goncalves  · Her condition was been steroid responsive  · In the past she completed Prednisone 40mg for 4-6 weeks, 30mg for 1 month, 20mg for 1 month, 15mg for 6 weeks, 10mg for 6 weeks, 5mg for 8 weeks, 2.5mg daily for 2 weeks and  2.5mg QOD for 4 weeks. Stopped Prednisone 5/2020  · Continue sinus regimen: flonase and Claritin   · Continue Lasix  · PRN albuterol and albuterol nebs  · Mold remediated  · Start Prednisone 20mg daily for 1 week, 10mg for 1 week,  5mg for 1 week, 2.5mg daily for 1 week and then follow up.

## 2022-03-02 ENCOUNTER — TELEPHONE (OUTPATIENT)
Dept: PULMONOLOGY | Age: 67
End: 2022-03-02

## 2022-03-02 DIAGNOSIS — J98.4 RESTRICTIVE LUNG DISEASE: Primary | ICD-10-CM

## 2022-03-09 RX ORDER — ALBUTEROL SULFATE 90 UG/1
2 AEROSOL, METERED RESPIRATORY (INHALATION) EVERY 6 HOURS PRN
Qty: 3 EACH | Refills: 1 | Status: SHIPPED | OUTPATIENT
Start: 2022-03-09 | End: 2022-07-13

## 2022-03-24 ENCOUNTER — SCHEDULED TELEPHONE ENCOUNTER (OUTPATIENT)
Dept: PULMONOLOGY | Age: 67
End: 2022-03-24
Payer: MEDICARE

## 2022-03-24 DIAGNOSIS — J84.9 ILD (INTERSTITIAL LUNG DISEASE) (HCC): Primary | ICD-10-CM

## 2022-03-24 DIAGNOSIS — J96.11 CHRONIC HYPOXEMIC RESPIRATORY FAILURE (HCC): ICD-10-CM

## 2022-03-24 DIAGNOSIS — J84.116 CRYPTOGENIC ORGANIZING PNEUMONIA (HCC): ICD-10-CM

## 2022-03-24 PROCEDURE — 99443 PR PHYS/QHP TELEPHONE EVALUATION 21-30 MIN: CPT | Performed by: INTERNAL MEDICINE

## 2022-03-24 RX ORDER — NINTEDANIB 100 MG/1
CAPSULE ORAL
Qty: 21 CAPSULE | Refills: 0 | Status: ON HOLD | OUTPATIENT
Start: 2022-03-24 | End: 2022-05-06

## 2022-03-24 RX ORDER — NINTEDANIB 150 MG/1
150 CAPSULE ORAL 2 TIMES DAILY
Qty: 60 CAPSULE | Refills: 0 | Status: ON HOLD | OUTPATIENT
Start: 2022-03-24 | End: 2022-05-06

## 2022-03-24 NOTE — PROGRESS NOTES
Dez Aguirre is a 69 y.o. female evaluated via telephone on 3/24/2022. She and/or health care decision maker is aware that that she may receive a bill for this telephone service, which includes applicable co-pays, depending on her insurance coverage, and has provided verbal consent to proceed. I affirm this is a Patient Initiated Episode with a Patient who has not had a related appointment within my department in the past 7 days or scheduled within the next 24 hours. Patient identification was verified at the start of the visit: Yes Total Time: minutes: 21-30 minutes Dez Aguirre was evaluated through a synchronous (real-time) audio encounter. The patient was located at home in a state where the provider was licensed to provide care. [facility]    Outpatient Follow Up Note    Chief Complaint: Hospital follow-up and SOB  Consulting provider: Dr Liz Reza    Interval History: 69 y.o. female No fever. + daily cough with yellow mucus  Was admitted 1 night 3 weeks ago. Then in the ED last week and sent home. Both for episodes of SOB and hypoxia at home that resolved with steroids. Using her inhaler or nebulizer as prescribed. Prior HPI:  admitted to the hospital for the ?4-5th time with similar presentations of SOB and Hypoxia and bilateral lung infiltrates. Bronchoscopy was again negative for infection. A right heart cath that was normal and then she had a subsequent high resolution CT scan while she was known to be euvolemic that showed persistent abnromalities. It seems to be most consistent with an ILD process. Possibly  or chronic HP or NSIP. Pt notes mold in a back room of her house. She stays out of this room. She is planning on having this remediated. Initial HPI:regarding abnormal PFTs, cough, congestion. The pt developed SOB and cough with green sputum, chest congestion, nasal congestion and sinus pressure since April.  Her symptoms 25 MG tablet, TAKE 1 TABLET BY MOUTH EVERY DAY AT NIGHT (Patient not taking: Reported on 2/14/2022), Disp: 90 tablet, Rfl: 1    nadolol (CORGARD) 20 MG tablet, Take 1 tablet by mouth daily, Disp: 90 tablet, Rfl: 1    atorvastatin (LIPITOR) 40 MG tablet, Take 1 tablet by mouth daily, Disp: 90 tablet, Rfl: 0    levothyroxine (SYNTHROID) 125 MCG tablet, Take 1 tablet by mouth Daily TAKE 1 TABLET BY MOUTH EVERY DAY, Disp: 90 tablet, Rfl: 1    traMADol (ULTRAM) 50 MG tablet, Take 1-2 tablets by mouth every 6 hours as needed for Pain for up to 180 days. Intended supply: 7 days. Take lowest dose possible to manage pain, Disp: 120 tablet, Rfl: 5    loratadine-pseudoephedrine ([employer] ALLERGY RELIEF-D12) 5-120 MG per extended release tablet, TAKE 1 TABLET BY MOUTH TWICE A DAY, Disp: 60 tablet, Rfl: 5    Blood Glucose Monitoring Suppl (TRUE METRIX METER) PUJA, Test daily and as needed, Disp: 1 each, Rfl: 0    blood glucose test strips (ASCENSIA AUTODISC VI;ONE TOUCH ULTRA TEST VI) strip, 1 each by In Vitro route daily As needed. , Disp: 100 each, Rfl: 3    TRUEplus Lancets 33G MISC, Use daily, Disp: 100 each, Rfl: 3    Blood Glucose Calibration (TRUE METRIX LEVEL 1) Low SOLN, use as needed, Disp: 1 each, Rfl: 2    lidocaine 4 % external patch, Place 2 patches onto the skin daily, Disp: 60 patch, Rfl: 1    Loratadine-Pseudoephedrine (LORATADINE-D 12HR PO), Take 1 tablet by mouth daily, Disp: , Rfl:     hydrOXYzine (ATARAX) 10 MG tablet, TAKE 1 TO 3 TABLETS BY MOUTH 3 TIMES DAILY AS NEEDED FOR ITCHING, Disp: 90 tablet, Rfl: 1    sodium chloride (OCEAN, BABY AYR) 0.65 % nasal spray, 1 spray by Nasal route as needed for Congestion, Disp: , Rfl:     diclofenac sodium (VOLTAREN) 1 % GEL, Apply 2 g topically 4 times daily, Disp: 2 Tube, Rfl: 1    INSULIN SYRINGE 1CC/29G 29G X 1/2\" 1 ML MISC, 1 each by Does not apply route 2 times daily, Disp: 100 each, Rfl: 5    FLUTICASONE FUROATE IN, Inhale into the lungs as needed, Disp: , Rfl:     Multiple Vitamins-Minerals (MULTIVITAMIN ADULT PO), Take by mouth daily, Disp: , Rfl:     OXYGEN, Inhale 3 L into the lungs, Disp: , Rfl:     Objective:   PHYSICAL EXAM: There were no vitals taken for this visit. Tele      LABS:  Reviewed any pertinent new labs that are available. 2/6/19 Immunocap , IGE 9, CBC WNL except EOs 0.9:  Bronchoscopy 6/28/2019   left upper lobe, transbronchial biopsies:  - Fragments of benign bronchial wall. - Negative for granulomas or other significant inflammation.  - Negative for malignancy. A. Right Paratracheal Lymph Node, Transbronchial Fine Needle Aspirate:       -  No malignant cells identified. B. Subcarinal Lymph Node, Transbronchial Fine Needle Aspirate:       -  No malignant cells identified. C. Lung, Left Upper Lobe, Bronchoalveolar Lavage:       -  No malignant cells identified. D. Lung, Bronchial Washings:       -  No malignant cells identified.     Subcarinal TB NA no phenotypically abnormal cells  AFB negative  Eosinophils 6%     CRP 56.9  AXEL negative  ANCA neg  HP panel neg    8/9/19 LHC/RHC: no CAD, PCWP 7, PA mean 19    8/21/19 [facility]opsy at [facility]:  A.   Left lung, upper lobe, cryobiopsy:  - Alveolated lung parenchyma with organizing pneumonia pattern of injury. - Interstitial expansion by chronic inflammation and fibrosis with rare  ill-formed clusters of epithelioid histiocytes. - Negative for well-formed granulomas, giant cells, polarizable material,  atypia, and malignancy. B.   Left lung, lingula, cryobiopsy:  - Predominantly peribronchiolar lung parenchyma with interstitial fibrosis,  chronic inflammation and organizing pneumonia pattern of injury.  - Fragment of benign pleura. - Immunostains for CMV, HSV (I and II), special stains for fungal elements  (GMS) and bacterial organisms (Gram stain) are all negative. - Insitu hybridization for adenovirus is negative.    - Negative for granulomas, giant cells, atypia, and path from [facility] canned into the chart    12/24/19 BAL neg    PFTs 9/24/18 at [facility] were inconclusive due to excessive variability in her results. There was not an obstructive ratio however. 3/1/19   FVC  (62%) FEV1 1.51 (64%) FEV1/FVC ratio 0.79   TLC  (66%)  RV  (%)   DLCO (43 corrects to 86%)  ERV 34%   MCT Negative    RHC done 8/2019 that showed a normal wedge pressure excluding pulmonary edema as a cause of her pulmonary disease    IMAGING:  I personally reviewed and interpreted the following imaging today in the office:   3/15/19 HRCT:   Mild mosaic attenuation throughout the lungs on expiratory images, compatible   with small airways disease or air trapping.       There are few subpleural irregular opacities throughout the lungs   bilaterally, in the apices, and in the lower lobes.  Changes appears similar   compared to prior, and could be due to scarring or mild nonspecific fibrosis.       Mild fusiform bronchiectasis right lower lobe, and traction bronchiectasis   right middle lobe.       Scattered areas of bronchial wall thickening are seen     6/16/19 Chest CT:   Moderate multifocal airspace opacity involving all segments of both   lungs-moderately worse over the past 2 weeks.  Multifocal pneumonia is   favored.  Asymmetric pulmonary edema superimposed upon COPD possible but less   likely.  Atypical pneumonia might be considered clinically. 8/9/19 hrct     Pulmonary parenchymal findings most compatible with organizing pneumonia. Since 03/08/2019, there has been progressive ground-glass abnormality with   traction bronchiolectasis.       Unchanged mediastinal lymphadenopathy, presumably reactive. 11/7/19 CTPA:   1. No evidence of acute pulmonary embolism or acute aortic dissection or   aneurysm. 2. Chronic changes of emphysema as well as interstitial lung disease and mild   bronchiectatic changes.  No active pleural disease.    3. Redemonstration of borderline left hilar lymphadenopathy. Chest CT 1/12/21:   Lungs/pleura: The central airways are patent. Scattered areas of ground-glass   type opacity with associated architectural distortion and irregular   subpleural opacities are not significantly changed when compared to the most   recent exam. Blease Neda scattered areas of traction bronchiectasis are similar   to the previous.  When compared to the more remote exam there is overall   improvement of ground-glass opacity which may well have been related to   superimposed infiltrate. 6/18/2021 Chest CT:   Stable fibrotic changes throughout the lungs with stable small mediastinal   nodes. Cirrhotic appearing liver     2/8/22 CTPA:   Impression   No evidence of a pulmonary embolus.       Mildly dilated and atherosclerotic thoracic aorta with no aneurysm or   dissection.       Moderate subpleural interstitial and ground-glass opacities scattered   throughout both lungs which is more prominent and could represent worsening   multisegmental bronchopneumonia vs progressive fibrosis and scarring.  This   pattern of pneumonia has been described with COVID-19 disease.  Recommend   correlating with lab values.       Bronchiectasis and bullous emphysematous changes throughout both lungs which   is more prominent and is suggestive of progressive pulmonary fibrosis and   scarring.  Recommend pulmonology follow-up.       Mild mediastinal adenopathy which is unchanged. ASSESSMENT:  · Based on the biopsies, I favor a h/o of cryptogenic organizing pneumonia and possibly coexisting NSIP or chronic HP.     · After a period of stability she now seems to have the phenotype of Progressive fibrosing lung disease  · Chronic hypoxic respiratory failure -resolved  · Cough and Fever had been intermittently associated with her condition  · Restrictive lung disease  · Chronic Rhinitis with PND   · Obesity  · Afib     PLAN:   · Start Ofev at 100mg PO daily for 1 week, then BID for 1 week and then 150mg PO BID ongoing  · Check LFT  · Continue Mucinex  · Start Acapella  · PRN tessalon ivanna  · In the past she completed Prednisone 40mg for 4-6 weeks, 30mg for 1 month, 20mg for 1 month, 15mg for 6 weeks, 10mg for 6 weeks, 5mg for 8 weeks, 2.5mg daily for 2 weeks and  2.5mg QOD for 4 weeks. Stopped Prednisone 5/2020  · Multiple shorter steroid tapers and most recently completed Prednisone 20mg daily for 1 week, 10mg for 1 week,  5mg for 1 week, 2.5mg daily for 1 week.    · Continue sinus regimen: flonase and Claritin   · The pt is on PRN Lasix  · PRN albuterol and albuterol nebs  · Mold remediated  · F/u for an OV with LFT panel 1 mo after starting ofev

## 2022-03-28 ENCOUNTER — TELEPHONE (OUTPATIENT)
Dept: PULMONOLOGY | Age: 67
End: 2022-03-28

## 2022-03-28 DIAGNOSIS — J84.9 ILD (INTERSTITIAL LUNG DISEASE) (HCC): Primary | ICD-10-CM

## 2022-03-28 DIAGNOSIS — Z51.81 THERAPEUTIC DRUG MONITORING: ICD-10-CM

## 2022-03-30 NOTE — TELEPHONE ENCOUNTER
Pt called stating that she can not afford the $2,000 copay required for medication. Email was sent to [facility] wutabout [facility] rep. Local [facility] not able to fill will have to go through [facility] specialty pharmacy.

## 2022-03-30 NOTE — TELEPHONE ENCOUNTER
Called [facility] specialty pharmacy [phone] states they are still showing PA not approved. PA faxed to pharmacy fax# [phone]. Will f/u.

## 2022-03-31 NOTE — TELEPHONE ENCOUNTER
Called pharmacy states copay is $8239 would need to call [facility] # [phone].

## 2022-03-31 NOTE — TELEPHONE ENCOUNTER
Called aj with patient given # to call for assistance. Will f/u. Called [facility] requested order for Albuterol neb sol per patient request she wants to fill from [address]. They are faxing over form.

## 2022-04-01 NOTE — TELEPHONE ENCOUNTER
Order faxed for Albuterol for [facility]. Patient aware. She is still trying to reach [facility] assistance. Will f/u next week.

## 2022-04-04 ENCOUNTER — TELEPHONE (OUTPATIENT)
Dept: FAMILY MEDICINE CLINIC | Age: 67
End: 2022-04-04

## 2022-04-04 ENCOUNTER — NURSE TRIAGE (OUTPATIENT)
Dept: OTHER | Facility: CLINIC | Age: 67
End: 2022-04-04

## 2022-04-04 ENCOUNTER — TELEMEDICINE (OUTPATIENT)
Dept: FAMILY MEDICINE CLINIC | Age: 67
End: 2022-04-04
Payer: MEDICARE

## 2022-04-04 DIAGNOSIS — J44.9 CHRONIC OBSTRUCTIVE PULMONARY DISEASE, UNSPECIFIED COPD TYPE (HCC): ICD-10-CM

## 2022-04-04 DIAGNOSIS — J84.9 ILD (INTERSTITIAL LUNG DISEASE) (HCC): ICD-10-CM

## 2022-04-04 DIAGNOSIS — J06.9 VIRAL URI: Primary | ICD-10-CM

## 2022-04-04 DIAGNOSIS — J98.4 RESTRICTIVE LUNG DISEASE: ICD-10-CM

## 2022-04-04 PROCEDURE — G8400 PT W/DXA NO RESULTS DOC: HCPCS | Performed by: FAMILY MEDICINE

## 2022-04-04 PROCEDURE — G8428 CUR MEDS NOT DOCUMENT: HCPCS | Performed by: FAMILY MEDICINE

## 2022-04-04 PROCEDURE — 4040F PNEUMOC VAC/ADMIN/RCVD: CPT | Performed by: FAMILY MEDICINE

## 2022-04-04 PROCEDURE — 3017F COLORECTAL CA SCREEN DOC REV: CPT | Performed by: FAMILY MEDICINE

## 2022-04-04 PROCEDURE — 1090F PRES/ABSN URINE INCON ASSESS: CPT | Performed by: FAMILY MEDICINE

## 2022-04-04 PROCEDURE — 1123F ACP DISCUSS/DSCN MKR DOCD: CPT | Performed by: FAMILY MEDICINE

## 2022-04-04 PROCEDURE — 99214 OFFICE O/P EST MOD 30 MIN: CPT | Performed by: FAMILY MEDICINE

## 2022-04-04 RX ORDER — FLUTICASONE PROPIONATE 50 MCG
2 SPRAY, SUSPENSION (ML) NASAL DAILY
Qty: 16 G | Refills: 1 | Status: SHIPPED | OUTPATIENT
Start: 2022-04-04 | End: 2022-05-26

## 2022-04-04 ASSESSMENT — ENCOUNTER SYMPTOMS
COUGH: 0
WHEEZING: 0
SINUS PRESSURE: 1
SHORTNESS OF BREATH: 1

## 2022-04-04 ASSESSMENT — PATIENT HEALTH QUESTIONNAIRE - PHQ9
SUM OF ALL RESPONSES TO PHQ QUESTIONS 1-9: 0
SUM OF ALL RESPONSES TO PHQ QUESTIONS 1-9: 0
1. LITTLE INTEREST OR PLEASURE IN DOING THINGS: 0
SUM OF ALL RESPONSES TO PHQ QUESTIONS 1-9: 0
SUM OF ALL RESPONSES TO PHQ9 QUESTIONS 1 & 2: 0
SUM OF ALL RESPONSES TO PHQ QUESTIONS 1-9: 0
2. FEELING DOWN, DEPRESSED OR HOPELESS: 0

## 2022-04-04 NOTE — TELEPHONE ENCOUNTER
See my previous note. If she is short of breath, I recommend she go the emergency room. If she would like to do an office visit we can do that but I recommend emergency room.

## 2022-04-04 NOTE — PATIENT INSTRUCTIONS
Patient Education        Interstitial Lung Disease: Care Instructions  Your Care Instructions     Interstitial lung disease is a long-term (chronic) lung disease. It happens because of damage between the air sacs in the lung. The damage scars the lungand causes breathing problems. People with interstitial lung disease get breathless during exercise and mayhave a dry cough. These problems may get worse slowly or very quickly. Interstitial lung disease can be caused by breathing in dust from asbestos and silica. It also can be caused by infections and some medicines. Sometimesdoctors cannot find the cause. You may get medicine to treat the problem. Corticosteroids can sometimes reduce the swelling of lung tissue and prevent more damage. Oxygen treatment may helpyour condition. Follow-up care is a key part of your treatment and safety. Be sure to make and go to all appointments, and call your doctor if you are having problems. It's also a good idea to know your test results and keep alist of the medicines you take. How can you care for yourself at home?  Do not smoke. Smoking makes interstitial lung disease worse. If you need help quitting, talk to your doctor about stop-smoking programs and medicines. These can increase your chances of quitting for good.  Take your medicines exactly as prescribed. Call your doctor if you have any problems with your medicine.  Get flu and pneumococcal shots. These help prevent lung infection.  Make an exercise plan with help from your doctor or other health professional. Exercise can help you breathe more easily.  Think about joining a support group. This can help you cope with problems caused by interstitial lung disease. When should you call for help? Call your doctor now or seek immediate medical care if:     Your shortness of breath gets worse.      You cough up blood.      You have severe chest pain.    Watch closely for changes in your health, and be sure to

## 2022-04-04 NOTE — TELEPHONE ENCOUNTER
Received call from jesi  at [facility] with [facility] Complaint. Subjective: Caller states \"tariq been having sinus problems and headaches , causing pain left eye. been miserable.  (crypto organizing PNA)  New medication (hasnt started) . difficulty time breathing and doing things. \"     Current Symptoms: patient reports this morning like someone kicked her doesn't feel well. Pt woke up with headache behind left eye. Trouble breathing uriah which pt states normal with her condition pt reports in between moderate breathing difficulty  Pt reports seeing pulmonary for breathing and it is same since discussing it with them. Pt checked oxygen it was 84% after waking , pt on 3 L oxygen. After few mins res tooxygen was 93% and heart rate 86. . Drainage from sinus. Patient denies nausea, vomiting , diarrhea. Patient denies chest pains. Onset: pt reports 1 week    Associated Symptoms: reduced activity    Pain Severity: 6/10; pain also behind left eye, headache ; constant this morning, but had been intermittent     Temperature: no fever     What has been tried: breathing treatment  APAP    LMP: NA Pregnant: NA    Recommended disposition: Go to ED Now- Patient declines to go to the ER. Patient reports  is not here . Advised pt will call the office. Care advice provided, patient verbalizes understanding; denies any other questions or concerns; instructed to call back for any new or worsening symptoms. Writer provided warm transfer to [facility], located within [facility] for decline in ER dispostion , for further assessment and resume of care     Attention Provider: Thank you for allowing me to participate in the care of your patient. The patient was connected to triage in response to information provided to the ECC/PSC. Please do not respond through this encounter as the response is not directed to a shared pool.         Reason for Disposition   MODERATE difficulty breathing (e.g., speaks in phrases, SOB even at rest, pulse 100-120) of new-onset or worse than normal    Protocols used: BREATHING DIFFICULTY-ADULT-[place]

## 2022-04-04 NOTE — PROGRESS NOTES
2022    TELEHEALTH EVALUATION -- Audio/Visual (During YXWTF-47 public health emergency)    HPI:    Merri Essex (:  1955) has requested an audio/video evaluation for the following concern(s):    Patient with COPD, and interstitial lung disease. She is currently on 3 L/min nasal cannula. She is followed by pulmonology. Her pulse ox runs between 92 and 96% at rest but will decrease to 60% with activity. Her pulmonologist has prescribed OFEV but it has not been approved by her insurance yet. She is using albuterol nebs for now. She feels tired a large portion of the time    Left sided sinus pressure which has improved a little with mucinex. Patient wants to know what her life expectancy is    Review of Systems   Constitutional: Negative for fatigue and fever. HENT: Positive for congestion and sinus pressure. Respiratory: Positive for shortness of breath. Negative for cough and wheezing. All other systems reviewed and are negative. Prior to Visit Medications    Medication Sig Taking? Authorizing Provider   Nintedanib Esylate (OFEV) 100 MG CAPS 100mg PO daily for 1 week, then BID for 1 week.   (Then increase to 150mg bid)  Perry Cisse MD   Nintedanib Esylate (OFEV) 150 MG CAPS Take 150 mg by mouth 2 times daily  Perry Cisse MD   albuterol sulfate  (90 Base) MCG/ACT inhaler INHALE 2 PUFFS INTO THE LUNGS EVERY 6 HOURS AS NEEDED FOR WHEEZING  Natali Cool MD   predniSONE (DELTASONE) 5 MG tablet 20mg daily for 1 week, 10mg for 1 week, 5mg for 1 week, 2.5mg daily for 1 week  Perry Cisse MD   albuterol (PROVENTIL) (2.5 MG/3ML) 0.083% nebulizer solution INHALE THE CONTENTS OF 1 VIAL VIA NEBULIZER EVERY 6 HOURS AS NEEDED FOR WHEEZING  Willie Linda MD   benzonatate (TESSALON) 200 MG capsule TAKE 1 CAPSULE BY MOUTH 3 TIMES A DAY AS NEEDED FOR COUGH  Perry Cisse MD   empagliflozin (JARDIANCE) 25 MG tablet Take 1 tablet by mouth daily  Willie Linda MD   SITagliptin (JANUVIA) 100 MG tablet Take 1 tablet by mouth daily  Yamel Cherry MD   pantoprazole (PROTONIX) 40 MG tablet Take 1 tablet by mouth every morning (before breakfast)  Yamel Cherry MD   sertraline (ZOLOFT) 100 MG tablet Take 2 tablets by mouth daily  Yamel Cherry MD   mirtazapine (REMERON) 30 MG tablet Take 1 tablet by mouth nightly  Yamel Cherry MD   amitriptyline (ELAVIL) 25 MG tablet TAKE 1 TABLET BY MOUTH EVERY DAY AT NIGHT  Patient not taking: Reported on 2/14/2022  Yamel Cherry MD   nadolol (CORGARD) 20 MG tablet Take 1 tablet by mouth daily  Yamel Cherry MD   atorvastatin (LIPITOR) 40 MG tablet Take 1 tablet by mouth daily  Yamel Cherry MD   levothyroxine (SYNTHROID) 125 MCG tablet Take 1 tablet by mouth Daily TAKE 1 TABLET BY MOUTH EVERY DAY  Yamel Cherry MD   traMADol (ULTRAM) 50 MG tablet Take 1-2 tablets by mouth every 6 hours as needed for Pain for up to 180 days. Intended supply: 7 days. Take lowest dose possible to manage pain  Yamel Cherry MD   loratadine-pseudoephedrine ([employer] ALLERGY RELIEF-D12) 5-120 MG per extended release tablet TAKE 1 TABLET BY MOUTH TWICE A DAY  Yamel Cherry MD   Blood Glucose Monitoring Suppl (TRUE METRIX METER) PUJA Test daily and as needed  Yamel Cherry MD   blood glucose test strips (ASCENSIA AUTODISC VI;ONE TOUCH ULTRA TEST VI) strip 1 each by In Vitro route daily As needed.   Yamel Cherry MD   TRUEplus Lancets 33G MISC Use daily  Yamel Cherry MD   Blood Glucose Calibration (TRUE METRIX LEVEL 1) Low SOLN use as needed  Yamel Cherry MD   lidocaine 4 % external patch Place 2 patches onto the skin daily  Elier Lucia MD   Loratadine-Pseudoephedrine (LORATADINE-D 12HR PO) Take 1 tablet by mouth daily  Historical Provider, MD   hydrOXYzine (ATARAX) 10 MG tablet TAKE 1 TO 3 TABLETS BY MOUTH 3 TIMES DAILY AS NEEDED FOR ITCHING  Yamel Cherry MD   sodium chloride (OCEAN, BABY AYR) 0.65 % nasal spray 1 spray by Nasal route as needed for Congestion  Historical Provider, MD   diclofenac sodium (VOLTAREN) 1 % GEL Apply 2 g topically 4 times daily  Larena Bernheim, MD   INSULIN SYRINGE 1CC/29G 29G X 1/2\" 1 ML MISC 1 each by Does not apply route 2 times daily  Ethan Retana MD   FLUTICASONE FUROATE IN Inhale into the lungs as needed  Historical Provider, MD   Multiple Vitamins-Minerals (MULTIVITAMIN ADULT PO) Take by mouth daily  Historical Provider, MD   OXYGEN Inhale 3 L into the lungs  Historical Provider, MD       Social History     Tobacco Use    Smoking status: Never Smoker    Smokeless tobacco: Never Used   Vaping Use    Vaping Use: Never used   Substance Use Topics    Alcohol use: No    Drug use: No        Allergies   Allergen Reactions    Penicillins Anaphylaxis     Tolerates Meropenem.  Cefuroxime      Tolerates Meropenem.  Doxycycline Hives    Imitrex [Sumatriptan] Other (See Comments)     Feels like pins and needles    Meperidine     Sulfa Antibiotics Hives    Keflex [Cephalexin] Itching and Rash     Tolerates Meropenem.  Tape Orange Endow Tape] Rash   ,   Past Medical History:   Diagnosis Date    [facility]fib ([facility]     Anxiety     Cryptogenic organizing pneumonia ([facility]     Depression     GERD (gastroesophageal reflux disease)     Hyperlipidemia     On home oxygen therapy     uses O2 3L prn    Rash     Thyroid disease     hypothyroidism   ,   Past Surgical History:   Procedure Laterality Date    ARM SURGERY Left 3/2/2020    LEFT ULNAR NERVE DECOMPRESSION AT THE ELBOW performed by Larena Bernheim, MD at [facility] 6/27/2019    EBUS WF W/ANES.  performed by Nell Jiang MD at [address] [place] [address]  6/27/2019    BRONCHOSCOPY/TRANSBRONCHIAL NEEDLE BIOPSY performed by Nell Jiang MD at [address] [place] [address]  6/27/2019    BRONCHOSCOPY/TRANSBRONCHIAL LUNG BIOPSY performed by Nell Jiang MD at [address] [place] [address]  6/27/2019    BRONCHOSCOPY ALVEOLAR LAVAGE performed by Peter Landau, MD at [address]  07/10/2019    BRONCHOSCOPY N/A 7/10/2019    BRONCHOSCOPY ALVEOLAR LAVAGE performed by Becky Forrester MD at [address] Bilateral 08/06/2019    BRONCHOSCOPY N/A 8/6/2019    BRONCHOSCOPY ALVEOLAR LAVAGE performed by Milan Sanon MD at [address] N/A 12/24/2019    BRONCHOSCOPY ALVEOLAR LAVAGE performed by Abiodun Castillo MD at [address] [facility], TOTAL ABDOMINAL      partial   ,   Social History     Tobacco Use    Smoking status: Never Smoker    Smokeless tobacco: Never Used   Vaping Use    Vaping Use: Never used   Substance Use Topics    Alcohol use: No    Drug use: No       PHYSICAL EXAMINATION:  [ INSTRUCTIONS:  \"[x]\" Indicates a positive item  \"[]\" Indicates a negative item  -- DELETE ALL ITEMS NOT EXAMINED]  Vital Signs: (As obtained by patient/caregiver or practitioner observation)      Pulse oximetry- 96%    Constitutional: [x] Appears well-developed and well-nourished [x] No apparent distress. Mental status  [x] Alert and awake  [x] Oriented to person/place/time [x]Able to follow commands      Eyes:  EOM    [x]  Normal  [] Abnormal-  Sclera  [x]  Normal  [] Abnormal -         Discharge [x]  None visible  [] Abnormal -    HENT:   [x] Normocephalic, atraumatic. [] Abnormal   [x] Mouth/Throat: Mucous membranes are moist.     External Ears [x] Normal  [] Abnormal-     Neck: [x] No visualized mass     Pulmonary/Chest: [x] Respiratory effort normal.  [x] No visualized signs of difficulty breathing or respiratory distress. patient is able to talk in full sentences      [] Abnormal-          ASSESSMENT/PLAN:  1. Viral URI  10 you Mucinex. Begin Flonase   - fluticasone (FLONASE) 50 MCG/ACT nasal spray; 2 sprays by Nasal route daily  Dispense: 16 g; Refill: 1    2. Restrictive lung disease      3.  [facility]interstitial [facility]) [facility]  Follow-up with pulmonology  Discussed with patient that we really cannot determine how long someone will live but with the rapid progression of her lung disease, I recommend that she have her living will and medical power of  in order. 4. Chronic obstructive pulmonary disease, unspecified COPD type ([facility]  Continue albuterol as needed      Return if symptoms worsen or fail to improve. Elise Hall, was evaluated through a synchronous (real-time) audio-video encounter. The patient (or guardian if applicable) is aware that this is a billable service, which includes applicable co-pays. This Virtual Visit was conducted with patient's (and/or legal guardian's) consent. The visit was conducted pursuant to the emergency declaration under the [address] [place] [address] authority and the [employer] Act. Patient identification was verified, and a caregiver was present when appropriate. The patient was located at home in a state where the provider was licensed to provide care. Total time spent on this encounter: Not billed by time    --Mathieu Vasquez MD on 4/4/2022 at 3:43 PM    An electronic signature was used to authenticate this note.

## 2022-04-04 NOTE — TELEPHONE ENCOUNTER
Patient was transferred from nurse triage. Patient is having sinus issues and shortness of breath. She stated the shortness of breath is due to her  worsening. She stated Dr. Kartik Ortega gave her a medication but it cost $1,000 so they are trying to get funding for it. Nurse triage stated she is severely short of breath, O2 was 84 when she sat down from walking and it went up to 93 with rest. Nurse triage stated they recommended ER but patient denied due to her  not being home to take her. She stated she would like to know what you recommend she do. She stated if you recommend the ER she will call the squad to go.

## 2022-04-08 NOTE — TELEPHONE ENCOUNTER
Patient called with message left for patient to call back to office.   Patient needs to call to see if she is able to get copay assistance for [facility] ph# [phone]

## 2022-04-08 NOTE — TELEPHONE ENCOUNTER
Patient called is unable to get assistance for [facility] and copay is $[id number] . Please advise.

## 2022-04-12 NOTE — TELEPHONE ENCOUNTER
Patient called states sounds like there are fund but they need [facility] so they can start the process. What dose of Esbriet?

## 2022-04-13 DIAGNOSIS — F32.0 MILD SINGLE CURRENT EPISODE OF MAJOR DEPRESSIVE DISORDER (HCC): ICD-10-CM

## 2022-04-13 DIAGNOSIS — F41.9 ANXIETY: ICD-10-CM

## 2022-04-13 DIAGNOSIS — G43.009 MIGRAINE WITHOUT AURA AND WITHOUT STATUS MIGRAINOSUS, NOT INTRACTABLE: ICD-10-CM

## 2022-04-13 RX ORDER — MIRTAZAPINE 30 MG/1
TABLET, FILM COATED ORAL
Qty: 90 TABLET | Refills: 1 | OUTPATIENT
Start: 2022-04-13

## 2022-04-13 RX ORDER — AMITRIPTYLINE HYDROCHLORIDE 25 MG/1
TABLET, FILM COATED ORAL
Qty: 90 TABLET | Refills: 1 | OUTPATIENT
Start: 2022-04-13

## 2022-04-13 RX ORDER — SERTRALINE HYDROCHLORIDE 100 MG/1
TABLET, FILM COATED ORAL
Qty: 180 TABLET | Refills: 1 | OUTPATIENT
Start: 2022-04-13

## 2022-04-13 RX ORDER — PANTOPRAZOLE SODIUM 40 MG/1
TABLET, DELAYED RELEASE ORAL
Qty: 90 TABLET | Refills: 1 | OUTPATIENT
Start: 2022-04-13

## 2022-04-13 RX ORDER — NADOLOL 20 MG/1
TABLET ORAL
Qty: 90 TABLET | Refills: 1 | OUTPATIENT
Start: 2022-04-13

## 2022-04-18 ENCOUNTER — APPOINTMENT (OUTPATIENT)
Dept: CT IMAGING | Age: 67
End: 2022-04-18
Payer: MEDICARE

## 2022-04-18 ENCOUNTER — HOSPITAL ENCOUNTER (EMERGENCY)
Age: 67
Discharge: ANOTHER HOSPITAL | End: 2022-04-18
Attending: STUDENT IN AN ORGANIZED HEALTH CARE EDUCATION/TRAINING PROGRAM
Payer: MEDICARE

## 2022-04-18 ENCOUNTER — APPOINTMENT (OUTPATIENT)
Dept: GENERAL RADIOLOGY | Age: 67
End: 2022-04-18
Payer: MEDICARE

## 2022-04-18 VITALS
HEART RATE: 78 BPM | BODY MASS INDEX: 30.12 KG/M2 | RESPIRATION RATE: 22 BRPM | HEIGHT: 63 IN | OXYGEN SATURATION: 98 % | DIASTOLIC BLOOD PRESSURE: 70 MMHG | TEMPERATURE: 99.1 F | WEIGHT: 170 LBS | SYSTOLIC BLOOD PRESSURE: 122 MMHG

## 2022-04-18 DIAGNOSIS — J96.01 ACUTE RESPIRATORY FAILURE WITH HYPOXIA AND HYPERCAPNIA (HCC): Primary | ICD-10-CM

## 2022-04-18 DIAGNOSIS — J96.02 ACUTE RESPIRATORY FAILURE WITH HYPOXIA AND HYPERCAPNIA (HCC): Primary | ICD-10-CM

## 2022-04-18 DIAGNOSIS — Z87.09 HX OF INTERSTITIAL LUNG DISEASE: ICD-10-CM

## 2022-04-18 DIAGNOSIS — S22.42XA CLOSED FRACTURE OF MULTIPLE RIBS OF LEFT SIDE, INITIAL ENCOUNTER: ICD-10-CM

## 2022-04-18 LAB
A/G RATIO: 0.8 (ref 1.1–2.2)
ALBUMIN SERPL-MCNC: 3.5 G/DL (ref 3.4–5)
ALP BLD-CCNC: 115 U/L (ref 40–129)
ALT SERPL-CCNC: 10 U/L (ref 10–40)
ANION GAP SERPL CALCULATED.3IONS-SCNC: 11 MMOL/L (ref 3–16)
AST SERPL-CCNC: 28 U/L (ref 15–37)
BASE EXCESS VENOUS: -0.6 MMOL/L (ref -3–3)
BASE EXCESS VENOUS: 3 MMOL/L (ref -3–3)
BASOPHILS ABSOLUTE: 0.1 K/UL (ref 0–0.2)
BASOPHILS RELATIVE PERCENT: 1.2 %
BILIRUB SERPL-MCNC: 0.4 MG/DL (ref 0–1)
BUN BLDV-MCNC: 7 MG/DL (ref 7–20)
CALCIUM SERPL-MCNC: 8.9 MG/DL (ref 8.3–10.6)
CARBOXYHEMOGLOBIN: 1.2 % (ref 0–1.5)
CARBOXYHEMOGLOBIN: 2.2 % (ref 0–1.5)
CHLORIDE BLD-SCNC: 100 MMOL/L (ref 99–110)
CO2: 30 MMOL/L (ref 21–32)
CREAT SERPL-MCNC: 0.6 MG/DL (ref 0.6–1.2)
EKG ATRIAL RATE: 76 BPM
EKG ATRIAL RATE: 81 BPM
EKG DIAGNOSIS: NORMAL
EKG DIAGNOSIS: NORMAL
EKG P AXIS: 21 DEGREES
EKG P AXIS: 41 DEGREES
EKG P-R INTERVAL: 150 MS
EKG P-R INTERVAL: 156 MS
EKG Q-T INTERVAL: 392 MS
EKG Q-T INTERVAL: 416 MS
EKG QRS DURATION: 80 MS
EKG QRS DURATION: 82 MS
EKG QTC CALCULATION (BAZETT): 455 MS
EKG QTC CALCULATION (BAZETT): 468 MS
EKG R AXIS: -12 DEGREES
EKG R AXIS: 6 DEGREES
EKG T AXIS: -23 DEGREES
EKG T AXIS: 38 DEGREES
EKG VENTRICULAR RATE: 76 BPM
EKG VENTRICULAR RATE: 81 BPM
EOSINOPHILS ABSOLUTE: 1.4 K/UL (ref 0–0.6)
EOSINOPHILS RELATIVE PERCENT: 12 %
GFR AFRICAN AMERICAN: >60
GFR NON-AFRICAN AMERICAN: >60
GLUCOSE BLD-MCNC: 130 MG/DL (ref 70–99)
HCO3 VENOUS: 27.6 MMOL/L (ref 23–29)
HCO3 VENOUS: 27.7 MMOL/L (ref 23–29)
HCT VFR BLD CALC: 36.1 % (ref 36–48)
HEMOGLOBIN: 11.5 G/DL (ref 12–16)
INFLUENZA A: NOT DETECTED
INFLUENZA B: NOT DETECTED
LYMPHOCYTES ABSOLUTE: 1.3 K/UL (ref 1–5.1)
LYMPHOCYTES RELATIVE PERCENT: 11.4 %
MCH RBC QN AUTO: 24.1 PG (ref 26–34)
MCHC RBC AUTO-ENTMCNC: 31.8 G/DL (ref 31–36)
MCV RBC AUTO: 75.6 FL (ref 80–100)
METHEMOGLOBIN VENOUS: 0.1 %
METHEMOGLOBIN VENOUS: 0.3 %
MONOCYTES ABSOLUTE: 0.5 K/UL (ref 0–1.3)
MONOCYTES RELATIVE PERCENT: 4.1 %
NEUTROPHILS ABSOLUTE: 8.4 K/UL (ref 1.7–7.7)
NEUTROPHILS RELATIVE PERCENT: 71.3 %
O2 CONTENT, VEN: 11 VOL %
O2 SAT, VEN: 49 %
O2 SAT, VEN: 94 %
O2 THERAPY: ABNORMAL
O2 THERAPY: ABNORMAL
PCO2, VEN: 43 MMHG (ref 40–50)
PCO2, VEN: 59.3 MMHG (ref 40–50)
PDW BLD-RTO: 17.3 % (ref 12.4–15.4)
PH VENOUS: 7.29 (ref 7.35–7.45)
PH VENOUS: 7.43 (ref 7.35–7.45)
PLATELET # BLD: 301 K/UL (ref 135–450)
PMV BLD AUTO: 7.1 FL (ref 5–10.5)
PO2, VEN: 30.3 MMHG (ref 25–40)
PO2, VEN: 66.7 MMHG (ref 25–40)
POTASSIUM REFLEX MAGNESIUM: 3.6 MMOL/L (ref 3.5–5.1)
PRO-BNP: 917 PG/ML (ref 0–124)
PROCALCITONIN: 0.08 NG/ML (ref 0–0.15)
RBC # BLD: 4.78 M/UL (ref 4–5.2)
SARS-COV-2 RNA, RT PCR: NOT DETECTED
SODIUM BLD-SCNC: 141 MMOL/L (ref 136–145)
TCO2 CALC VENOUS: 29 MMOL/L
TCO2 CALC VENOUS: 29 MMOL/L
TOTAL PROTEIN: 7.8 G/DL (ref 6.4–8.2)
TROPONIN: <0.01 NG/ML
WBC # BLD: 11.8 K/UL (ref 4–11)

## 2022-04-18 PROCEDURE — 99285 EMERGENCY DEPT VISIT HI MDM: CPT

## 2022-04-18 PROCEDURE — 96374 THER/PROPH/DIAG INJ IV PUSH: CPT

## 2022-04-18 PROCEDURE — 36415 COLL VENOUS BLD VENIPUNCTURE: CPT

## 2022-04-18 PROCEDURE — 85025 COMPLETE CBC W/AUTO DIFF WBC: CPT

## 2022-04-18 PROCEDURE — 83880 ASSAY OF NATRIURETIC PEPTIDE: CPT

## 2022-04-18 PROCEDURE — 71101 X-RAY EXAM UNILAT RIBS/CHEST: CPT

## 2022-04-18 PROCEDURE — 6360000002 HC RX W HCPCS: Performed by: STUDENT IN AN ORGANIZED HEALTH CARE EDUCATION/TRAINING PROGRAM

## 2022-04-18 PROCEDURE — 84145 PROCALCITONIN (PCT): CPT

## 2022-04-18 PROCEDURE — 84484 ASSAY OF TROPONIN QUANT: CPT

## 2022-04-18 PROCEDURE — 82803 BLOOD GASES ANY COMBINATION: CPT

## 2022-04-18 PROCEDURE — 80053 COMPREHEN METABOLIC PANEL: CPT

## 2022-04-18 PROCEDURE — 71250 CT THORAX DX C-: CPT

## 2022-04-18 PROCEDURE — 93010 ELECTROCARDIOGRAM REPORT: CPT | Performed by: INTERNAL MEDICINE

## 2022-04-18 PROCEDURE — 93005 ELECTROCARDIOGRAM TRACING: CPT | Performed by: STUDENT IN AN ORGANIZED HEALTH CARE EDUCATION/TRAINING PROGRAM

## 2022-04-18 PROCEDURE — 6370000000 HC RX 637 (ALT 250 FOR IP): Performed by: STUDENT IN AN ORGANIZED HEALTH CARE EDUCATION/TRAINING PROGRAM

## 2022-04-18 PROCEDURE — 87636 SARSCOV2 & INF A&B AMP PRB: CPT

## 2022-04-18 RX ORDER — HYDROCODONE BITARTRATE AND ACETAMINOPHEN 5; 325 MG/1; MG/1
1 TABLET ORAL ONCE
Status: COMPLETED | OUTPATIENT
Start: 2022-04-18 | End: 2022-04-18

## 2022-04-18 RX ORDER — IPRATROPIUM BROMIDE AND ALBUTEROL SULFATE 2.5; .5 MG/3ML; MG/3ML
1 SOLUTION RESPIRATORY (INHALATION) ONCE
Status: COMPLETED | OUTPATIENT
Start: 2022-04-18 | End: 2022-04-18

## 2022-04-18 RX ORDER — METHYLPREDNISOLONE SODIUM SUCCINATE 125 MG/2ML
125 INJECTION, POWDER, LYOPHILIZED, FOR SOLUTION INTRAMUSCULAR; INTRAVENOUS ONCE
Status: COMPLETED | OUTPATIENT
Start: 2022-04-18 | End: 2022-04-18

## 2022-04-18 RX ADMIN — METHYLPREDNISOLONE SODIUM SUCCINATE 125 MG: 125 INJECTION, POWDER, FOR SOLUTION INTRAMUSCULAR; INTRAVENOUS at 01:25

## 2022-04-18 RX ADMIN — HYDROCODONE BITARTRATE AND ACETAMINOPHEN 1 TABLET: 5; 325 TABLET ORAL at 05:01

## 2022-04-18 RX ADMIN — IPRATROPIUM BROMIDE AND ALBUTEROL SULFATE 1 AMPULE: .5; 3 SOLUTION RESPIRATORY (INHALATION) at 01:26

## 2022-04-18 ASSESSMENT — ENCOUNTER SYMPTOMS
VOMITING: 0
NAUSEA: 0
RHINORRHEA: 0
ABDOMINAL PAIN: 0
COUGH: 1
BACK PAIN: 0
STRIDOR: 0
PHOTOPHOBIA: 0
SHORTNESS OF BREATH: 1
SORE THROAT: 0

## 2022-04-18 ASSESSMENT — PAIN SCALES - GENERAL: PAINLEVEL_OUTOF10: 7

## 2022-04-18 NOTE — ED NOTES
Writer trialed pt on 3L NC which is pt's baseline. Pt O2 dropped to 90%. Returned pt to 5L NC, pt O2 now 95% @ 5LNC. Resp 20-23, even, nonlabored. Will continue to monitor.       Amena Rodriguez RN  04/18/22 [id number]

## 2022-04-18 NOTE — ED NOTES
Pt O2 82-88%. Dr. Guerra Hash aware. Adjusted NC, pt flavio to 98% 5LNC. Will continue to monitor.       Gordon Alejandre RN  04/18/22 [id number]

## 2022-04-18 NOTE — ED PROVIDER NOTES
[address] ED  EMERGENCY DEPARTMENT ENCOUNTER      Pt Name: Christi Collins  MRN: [id number]  [place] 1955  Date of evaluation: 4/18/2022  Provider: Dayron Carter [facility]       Chief Complaint   Patient presents with    Respiratory Distress         HISTORY OF PRESENT ILLNESS   (Location/Symptom, Timing/Onset, Context/Setting, Quality, Duration, Modifying Factors, Severity)  Note limiting factors. Christi Collins is a 69 y.o. female who presents to the emergency department complaining of shortness of breath. Patient is on baseline 2-3 L oxygen. History of cryptogenic organizing pneumonia follows with pulmonologist Dr. Cher Veliz, interstitial lung disease. Patient was complaining of 1 week history of worsening shortness of breath, and new and worsening productive cough with yellow-green sputum. Complains of generalized fatigue as well. No chest pain no palpitations no abdominal pain no nausea no vomiting no fevers no chills. Patient reported that this morning was more short of breath and she has been, says that she checked her oxygen levels and it was in the 50s, she did multiple breathing treatments and called EMS at the time of EMS arrival patient was on 3 L nasal cannula, satting at 92 but was tachypneic. On arrival here patient on 5 L with SPO2 in the low to mid 90s. Patient is also complaining of left-sided rib pain. Reported had a fall yesterday did not lose consciousness did not have chest pain or palpitations fell down onto her knees denies knee pain now but states that she may have hit the left side of her ribs on the toilet. Does have a small area of ecchymosis to the site. Nursing Notes were reviewed.     PAST MEDICAL HISTORY     Past Medical History:   Diagnosis Date    A-fib [facility])     Anxiety     Cryptogenic organizing pneumonia ([facility] [address]     Depression     GERD (gastroesophageal reflux disease)     Hyperlipidemia     On home oxygen therapy uses O2 3L prn    Rash     Thyroid disease     hypothyroidism         SURGICAL HISTORY       Past Surgical History:   Procedure Laterality Date    ARM SURGERY Left 3/2/2020    LEFT ULNAR NERVE DECOMPRESSION AT THE ELBOW performed by Amanda Hardwick MD at [address] 6/27/2019    EBUS WF W/ANES.  performed by Americo Lesches, MD at [address]  6/27/2019    BRONCHOSCOPY/TRANSBRONCHIAL NEEDLE BIOPSY performed by Americo Lesches, MD at [address]  6/27/2019    BRONCHOSCOPY/TRANSBRONCHIAL LUNG BIOPSY performed by Americo Lesches, MD at [address]  6/27/2019    BRONCHOSCOPY ALVEOLAR LAVAGE performed by Americo Lesches, MD at [address]  07/10/2019    BRONCHOSCOPY N/A 7/10/2019    BRONCHOSCOPY ALVEOLAR LAVAGE performed by Kasie Grijalva MD at [address] Bilateral 08/06/2019    BRONCHOSCOPY N/A 8/6/2019    BRONCHOSCOPY ALVEOLAR LAVAGE performed by Abelardo Brock MD at [address] N/A 12/24/2019    BRONCHOSCOPY ALVEOLAR LAVAGE performed by Leilani Nunez MD at [address] [place] [address], TOTAL ABDOMINAL      partial         CURRENT MEDICATIONS       Previous Medications    ALBUTEROL (PROVENTIL) (2.5 MG/3ML) 0.083% NEBULIZER SOLUTION    INHALE THE CONTENTS OF 1 VIAL VIA NEBULIZER EVERY 6 HOURS AS NEEDED FOR WHEEZING    ALBUTEROL SULFATE  (90 BASE) MCG/ACT INHALER    INHALE 2 PUFFS INTO THE LUNGS EVERY 6 HOURS AS NEEDED FOR WHEEZING    AMITRIPTYLINE (ELAVIL) 25 MG TABLET    TAKE 1 TABLET BY MOUTH EVERY DAY AT NIGHT    ATORVASTATIN (LIPITOR) 40 MG TABLET    Take 1 tablet by mouth daily    BENZONATATE (TESSALON) 200 MG CAPSULE    TAKE 1 CAPSULE BY MOUTH 3 TIMES A DAY AS NEEDED FOR COUGH    BLOOD GLUCOSE CALIBRATION (TRUE METRIX LEVEL 1) LOW SOLN    use as needed    BLOOD GLUCOSE MONITORING SUPPL (TRUE METRIX METER) PUJA Test daily and as needed    BLOOD GLUCOSE TEST STRIPS (ASCENSIA AUTODISC VI;ONE TOUCH ULTRA TEST VI) STRIP    1 each by In Vitro route daily As needed. DICLOFENAC SODIUM (VOLTAREN) 1 % GEL    Apply 2 g topically 4 times daily    EMPAGLIFLOZIN (JARDIANCE) 25 MG TABLET    Take 1 tablet by mouth daily    FLUTICASONE (FLONASE) 50 MCG/ACT NASAL SPRAY    2 sprays by Nasal route daily    FLUTICASONE FUROATE IN    Inhale into the lungs as needed    HYDROXYZINE (ATARAX) 10 MG TABLET    TAKE 1 TO 3 TABLETS BY MOUTH 3 TIMES DAILY AS NEEDED FOR ITCHING    INSULIN SYRINGE 1CC/29G 29G X 1/2\" 1 ML MISC    1 each by Does not apply route 2 times daily    LEVOTHYROXINE (SYNTHROID) 125 MCG TABLET    Take 1 tablet by mouth Daily TAKE 1 TABLET BY MOUTH EVERY DAY    LIDOCAINE 4 % EXTERNAL PATCH    Place 2 patches onto the skin daily    LORATADINE-PSEUDOEPHEDRINE ([employer] ALLERGY RELIEF-D12) 5-120 MG PER EXTENDED RELEASE TABLET    TAKE 1 TABLET BY MOUTH TWICE A DAY    LORATADINE-PSEUDOEPHEDRINE (LORATADINE-D 12HR PO)    Take 1 tablet by mouth daily    MIRTAZAPINE (REMERON) 30 MG TABLET    Take 1 tablet by mouth nightly    MULTIPLE VITAMINS-MINERALS (MULTIVITAMIN ADULT PO)    Take by mouth daily    NADOLOL (CORGARD) 20 MG TABLET    Take 1 tablet by mouth daily    NINTEDANIB ESYLATE (OFEV) 100 MG CAPS    100mg PO daily for 1 week, then BID for 1 week.   (Then increase to 150mg bid)    NINTEDANIB ESYLATE (OFEV) 150 MG CAPS    Take 150 mg by mouth 2 times daily    OXYGEN    Inhale 3 L into the lungs    PANTOPRAZOLE (PROTONIX) 40 MG TABLET    Take 1 tablet by mouth every morning (before breakfast)    PREDNISONE (DELTASONE) 5 MG TABLET    20mg daily for 1 week, 10mg for 1 week, 5mg for 1 week, 2.5mg daily for 1 week    SERTRALINE (ZOLOFT) 100 MG TABLET    Take 2 tablets by mouth daily    SITAGLIPTIN (JANUVIA) 100 MG TABLET    Take 1 tablet by mouth daily    SODIUM CHLORIDE (OCEAN, BABY AYR) 0.65 % NASAL SPRAY    1 spray by Nasal route as needed for Congestion    TRAMADOL (ULTRAM) 50 MG TABLET    Take 1-2 tablets by mouth every 6 hours as needed for Pain for up to 180 days. Intended supply: 7 days. Take lowest dose possible to manage pain    TRUEPLUS LANCETS 33G MISC    Use daily       ALLERGIES     Penicillins, Cefuroxime, Doxycycline, Imitrex [sumatriptan], Meperidine, Sulfa antibiotics, Keflex [cephalexin], and Tape [adhesive tape]    FAMILY HISTORY       Family History   Problem Relation Age of Onset    Diabetes Mother     High Blood Pressure Mother     Asthma Sister     Other Father           SOCIAL HISTORY       Social History     Socioeconomic History    Marital status:      Spouse name: None    Number of children: 7    Years of education: None    Highest education level: None   Occupational History    None   Tobacco Use    Smoking status: Never Smoker    Smokeless tobacco: Never Used   Vaping Use    Vaping Use: Never used   Substance and Sexual Activity    Alcohol use: No    Drug use: No    Sexual activity: Not Currently   Other Topics Concern    None   Social History Narrative    None     Social Determinants of Health     Financial Resource Strain:     Difficulty of Paying Living Expenses: Not on file   Food Insecurity:     Worried About Running Out of Food in the Last Year: Not on file    Miguel of Food in the Last Year: Not on file   Transportation Needs:     Lack of Transportation (Medical): Not on file    Lack of Transportation (Non-Medical):  Not on file   Physical Activity:     Days of Exercise per Week: Not on file    Minutes of Exercise per Session: Not on file   Stress:     Feeling of Stress : Not on file   Social Connections:     Frequency of Communication with Friends and Family: Not on file    Frequency of Social Gatherings with Friends and Family: Not on file    Attends [employer] Services: Not on file    Active Member of Clubs or Organizations: Not on file    Attends Club or Organization Meetings: Not on file    Marital Status: Not on file   Intimate Partner Violence:     Fear of Current or Ex-Partner: Not on file    Emotionally Abused: Not on file    Physically Abused: Not on file    Sexually Abused: Not on file   Housing Stability:     Unable to Pay for Housing in the Last Year: Not on file    Number of [place] in the Last Year: Not on file    Unstable Housing in the Last Year: Not on file       SCREENINGS        [place] Coma Scale  Eye Opening: Spontaneous  Best Verbal Response: Oriented  Best Motor Response: Obeys commands  [place] Coma Scale Score: 15                   REVIEW OF SYSTEMS    (2-9 systems for level 4, 10 or more for level 5)   Review of Systems   Constitutional: Positive for fatigue. Negative for chills and fever. HENT: Negative for congestion, rhinorrhea and sore throat. Eyes: Negative for photophobia and visual disturbance. Respiratory: Positive for cough and shortness of breath. Negative for stridor. Cardiovascular: Negative for chest pain and leg swelling. Gastrointestinal: Negative for abdominal pain, nausea and vomiting. Genitourinary: Negative for decreased urine volume. Musculoskeletal: Negative for back pain, neck pain and neck stiffness. Skin: Negative for rash. Hematological: Negative for adenopathy. Psychiatric/Behavioral: Negative for confusion. PHYSICAL EXAM    (up to 7 for level 4, 8 or more for level 5)   RECENT VITALS:     Temp: 99.1 °F (37.3 °C),  Pulse: 85, Resp: 30, BP: (!) 142/82, SpO2: 98 %    Physical Exam  Constitutional:       General: She is not in acute distress. Appearance: She is not diaphoretic. HENT:      Head: Normocephalic and atraumatic. Eyes:      Pupils: Pupils are equal, round, and reactive to light. Neck:      Trachea: No tracheal deviation. Cardiovascular:      Rate and Rhythm: Normal rate and regular rhythm. Pulmonary:      Effort: Respiratory distress present. Comments: Diminished. Patient has small ecchymosis area to the lower lateral left ribs no crepitus. Tenderness over the rib. Abdominal:      General: There is no distension. Palpations: Abdomen is soft. Musculoskeletal:         General: Normal range of motion. Cervical back: Normal range of motion and neck supple. Skin:     General: Skin is warm. Neurological:      Mental Status: She is oriented to person, place, and time. DIAGNOSTIC RESULTS     EKG: All EKG's are interpreted by the Emergency Department Physician who either signs or Co-signs this chart in the absence of a cardiologist.      The Ekg interpreted by me shows  normal sinus rhythm with a rate of 76  Axis is   Left axis deviation  QTc is  normal  Intervals and Durations are unremarkable. ST Segments: nonspecific changes, inferior changes however the morphology appears on previous EKGs. No active chest pain. RADIOLOGY:   Non-plain film images such as CT, Ultrasound and MRI are read by the radiologist. Plain radiographic images are visualized and preliminarily interpreted by the emergency physician. Interpretation per the Radiologist below, if available at the time of this note:    XR RIBS LEFT INCLUDE CHEST (MIN 3 VIEWS)   Final Result   Left-sided rib fractures laterally at the level of the 7th and 9th ribs,   without significant displacement. Possible 8th rib fracture as well. No pneumothorax. Extensive chronic interstitial changes seen throughout the lungs.          CT CHEST WO CONTRAST    (Results Pending)         LABS:  Labs Reviewed   CBC WITH AUTO DIFFERENTIAL - Abnormal; Notable for the following components:       Result Value    WBC 11.8 (*)     Hemoglobin 11.5 (*)     MCV 75.6 (*)     MCH 24.1 (*)     RDW 17.3 (*)     Neutrophils Absolute 8.4 (*)     Eosinophils Absolute 1.4 (*)     All other components within normal limits   COMPREHENSIVE METABOLIC PANEL W/ REFLEX TO MG FOR LOW K - Abnormal; Notable for the following components:    Glucose 130 (*)     Albumin/Globulin Ratio 0.8 (*)     All other components within normal limits   BRAIN NATRIURETIC PEPTIDE - Abnormal; Notable for the following components:    Pro- (*)     All other components within normal limits   BLOOD GAS, VENOUS - Abnormal; Notable for the following components:    pH, [place] 7.285 (*)     pCO2, [place] 59.3 (*)     Carboxyhemoglobin 2.2 (*)     All other components within normal limits   COVID-19 & INFLUENZA COMBO   PROCALCITONIN   TROPONIN   BLOOD GAS, VENOUS       All other labs were within normal range or not returned as of this dictation. EMERGENCY DEPARTMENT COURSE and DIFFERENTIAL DIAGNOSIS/MDM:   Bean [address] is a 69 y.o. female who presents to the emergency department with the complaint of x1 week with productive cough. She is afebrile on arrival.  Will rule out COVID, flu. Does have underlying interstitial lung disease, will give dose of DuoNeb, Solu-Medrol here. Is requiring more oxygen than her baseline. We will check a blood gas. No chest pain however due to age risk factors will obtain EKG, troponin level. Likely admit she remains tachypneic and requiring increased oxygen. Plain film imaging and rib series ordered for evaluation of possible rib fracture as she has tenderness over the lateral lower ribs on left after a fall yesterday where she hit the toilet, she has a bruise to the area without crepitus. Is symmetric lung sounds bilaterally but diminished throughout. Lower suspicion for PE, no prior history, no signs of DVT on exam.    Chest x-ray showing seventh and ninth rib fracture on left,?  8, will send for CT chest for further evaluation, no obvious pneumothorax. Patient on 5 L nasal cannula, to maintain sats greater than 90%. No acute respiratory stress at this time. Repeat blood gas showed normalization of pH, and improved PCO2.     CT chest demonstrated fractures on labs with the potential for even more visualized on CT versus plain film imaging however due to motion artifact cannot see definitively. Due to multiple rib fractures and institutional policy patient will require transfer for trauma evaluation. CRITICAL CARE TIME   Total Critical Care time was 32 minutes, excluding separately reportable procedures. There was a high probability of clinically significant/life threatening deterioration in the patient's condition which required my urgent intervention. Clinical concern acute respiratory failure with hypoxia and hypercapnia  Intervention history physical exam chart review frequent reassessments to assess respiratory status, medication management, charting    CONSULTS:  None    PROCEDURES:  Unless otherwise noted below, none     Procedures        FINAL IMPRESSION      1. Acute respiratory failure with hypoxia and hypercapnia (HCC)    2. Hx of interstitial lung disease    3. Closed fracture of multiple ribs of left side, initial encounter          DISPOSITION/PLAN   DISPOSITION        PATIENT REFERRED TO:  No follow-up provider specified. DISCHARGE MEDICATIONS:  New Prescriptions    No medications on file     Controlled Substances Monitoring:     RX Monitoring 11/17/2021   Attestation -   Periodic Controlled Substance Monitoring Possible medication side effects, risk of tolerance/dependence & alternative treatments discussed. ;No signs of potential drug abuse or diversion identified.;Obtaining appropriate analgesic effect of treatment. Chronic Pain > 50 MEDD -   Chronic Pain > 80 MEDD Obtained or confirmed \"Medication Contract\" on file.        (Please note that portions of this note were completed with a voice recognition program.  Efforts were made to edit the dictations but occasionally words are mis-transcribed.)    Oscar Newberry DO (electronically signed)  Attending Emergency Physician            Oscar Newberry DO  04/18/22 [id number]

## 2022-04-19 NOTE — TELEPHONE ENCOUNTER
[email or web address]    Please email forms to patient for her to complete. Pt is currently admitted at [facility] - she fell on Sunday night and broke some ribs so she will be there for a few days.

## 2022-04-19 NOTE — TELEPHONE ENCOUNTER
Patient needs to complete consent form can get form from [employer] or can email to her. Try to reach patient states phone # is not working will try back.

## 2022-04-27 DIAGNOSIS — J06.9 VIRAL URI: ICD-10-CM

## 2022-04-27 RX ORDER — FLUTICASONE PROPIONATE 50 MCG
SPRAY, SUSPENSION (ML) NASAL
Refills: 1 | OUTPATIENT
Start: 2022-04-27

## 2022-04-29 NOTE — TELEPHONE ENCOUNTER
Called [facility] states they received all paperwork they are processing. Will send info once processed.

## 2022-05-02 ENCOUNTER — TELEPHONE (OUTPATIENT)
Dept: FAMILY MEDICINE CLINIC | Age: 67
End: 2022-05-02

## 2022-05-02 NOTE — TELEPHONE ENCOUNTER
Patient transferred from nurse triage. She stated she is short of breath and can hear herself breathing. When asked what her pulse ox was she stated she didn't know because she was too short of breath to reach it.  Patient advised ER per Dr. Alexandria Garg Request.

## 2022-05-03 ENCOUNTER — OFFICE VISIT (OUTPATIENT)
Dept: PULMONOLOGY | Age: 67
End: 2022-05-03
Payer: MEDICARE

## 2022-05-03 VITALS — HEART RATE: 83 BPM | OXYGEN SATURATION: 97 % | DIASTOLIC BLOOD PRESSURE: 76 MMHG | SYSTOLIC BLOOD PRESSURE: 126 MMHG

## 2022-05-03 DIAGNOSIS — J96.11 CHRONIC HYPOXEMIC RESPIRATORY FAILURE (HCC): ICD-10-CM

## 2022-05-03 DIAGNOSIS — J84.9 ILD (INTERSTITIAL LUNG DISEASE) (HCC): Primary | ICD-10-CM

## 2022-05-03 DIAGNOSIS — M54.16 LUMBAR RADICULOPATHY: ICD-10-CM

## 2022-05-03 DIAGNOSIS — J84.116 CRYPTOGENIC ORGANIZING PNEUMONIA (HCC): ICD-10-CM

## 2022-05-03 PROCEDURE — G8427 DOCREV CUR MEDS BY ELIG CLIN: HCPCS | Performed by: INTERNAL MEDICINE

## 2022-05-03 PROCEDURE — 1123F ACP DISCUSS/DSCN MKR DOCD: CPT | Performed by: INTERNAL MEDICINE

## 2022-05-03 PROCEDURE — 1090F PRES/ABSN URINE INCON ASSESS: CPT | Performed by: INTERNAL MEDICINE

## 2022-05-03 PROCEDURE — G8400 PT W/DXA NO RESULTS DOC: HCPCS | Performed by: INTERNAL MEDICINE

## 2022-05-03 PROCEDURE — 4040F PNEUMOC VAC/ADMIN/RCVD: CPT | Performed by: INTERNAL MEDICINE

## 2022-05-03 PROCEDURE — 3017F COLORECTAL CA SCREEN DOC REV: CPT | Performed by: INTERNAL MEDICINE

## 2022-05-03 PROCEDURE — G8417 CALC BMI ABV UP PARAM F/U: HCPCS | Performed by: INTERNAL MEDICINE

## 2022-05-03 PROCEDURE — 1036F TOBACCO NON-USER: CPT | Performed by: INTERNAL MEDICINE

## 2022-05-03 PROCEDURE — 99214 OFFICE O/P EST MOD 30 MIN: CPT | Performed by: INTERNAL MEDICINE

## 2022-05-03 NOTE — PROGRESS NOTES
[facility]    Outpatient Follow Up Note    Chief Complaint: Hospital follow-up and SOB  Consulting provider: Dr Radha Beth    Interval History: 69 y.o. female since last visit the patient had a fall onto her toilet at home and fractured 4 ribs on the left side. She was transferred from the emergency room in [facility] to [facility] trauma team.  She had significant oxygen requirement due to splinting. She states she still has pain but is getting close to her baseline. She was seen by pulmonary there and was started on a prednisone taper. The patient thinks she was told that her underlying lung disease was controlled although she was given prednisone. She is off of the prednisone taper now. She has been unable to get patient assistance with [employer] and is currently trying with pirfenidone. Prior HPI:  admitted to the hospital for the ?4-5th time with similar presentations of SOB and Hypoxia and bilateral lung infiltrates. Bronchoscopy was again negative for infection. A right heart cath that was normal and then she had a subsequent high resolution CT scan while she was known to be euvolemic that showed persistent abnromalities. It seems to be most consistent with an ILD process. Possibly  or chronic HP or NSIP. Pt notes mold in a back room of her house. She stays out of this room. She is planning on having this remediated. Initial HPI:regarding abnormal PFTs, cough, congestion. The pt developed SOB and cough with green sputum, chest congestion, nasal congestion and sinus pressure since April. Her symptoms intermittently improved with symptomatic treatments and nasal spray. Her SOB was better with her inhaler. Overall while her symptoms have waxed and waned they have not fully resolved. The patient does not have SOB at rest but has LEE. Exercise tolerance on level ground is 1 block. Stairs.  The patient has a  daily intermittent cough that is usually dry or sometimes productive of green sputum. The patient does wheeze intermittently. Reports waking up feeling up gagging and having missed a breath.     The pt was seen by Dr Zachery Oden at [facility] and was thought to have some sinus disease contributing to her cough and she also had PFTs to work up a pulmonary cause that were inconclusive.     Never smoker  Environmental/chemical exposure: Asbestos exposure: no    Patient worked as home health aid    Current Outpatient Medications:     fluticasone (FLONASE) 50 MCG/ACT nasal spray, 2 sprays by Nasal route daily, Disp: 16 g, Rfl: 1    albuterol sulfate  (90 Base) MCG/ACT inhaler, INHALE 2 PUFFS INTO THE LUNGS EVERY 6 HOURS AS NEEDED FOR WHEEZING, Disp: 3 each, Rfl: 1    albuterol (PROVENTIL) (2.5 MG/3ML) 0.083% nebulizer solution, INHALE THE CONTENTS OF 1 VIAL VIA NEBULIZER EVERY 6 HOURS AS NEEDED FOR WHEEZING, Disp: 720 mL, Rfl: 1    benzonatate (TESSALON) 200 MG capsule, TAKE 1 CAPSULE BY MOUTH 3 TIMES A DAY AS NEEDED FOR COUGH, Disp: 90 capsule, Rfl: 5    empagliflozin (JARDIANCE) 25 MG tablet, Take 1 tablet by mouth daily, Disp: 90 tablet, Rfl: 1    SITagliptin (JANUVIA) 100 MG tablet, Take 1 tablet by mouth daily, Disp: 90 tablet, Rfl: 1    pantoprazole (PROTONIX) 40 MG tablet, Take 1 tablet by mouth every morning (before breakfast), Disp: 90 tablet, Rfl: 1    sertraline (ZOLOFT) 100 MG tablet, Take 2 tablets by mouth daily, Disp: 180 tablet, Rfl: 1    mirtazapine (REMERON) 30 MG tablet, Take 1 tablet by mouth nightly, Disp: 90 tablet, Rfl: 1    nadolol (CORGARD) 20 MG tablet, Take 1 tablet by mouth daily, Disp: 90 tablet, Rfl: 1    atorvastatin (LIPITOR) 40 MG tablet, Take 1 tablet by mouth daily, Disp: 90 tablet, Rfl: 0    levothyroxine (SYNTHROID) 125 MCG tablet, Take 1 tablet by mouth Daily TAKE 1 TABLET BY MOUTH EVERY DAY, Disp: 90 tablet, Rfl: 1    traMADol (ULTRAM) 50 MG tablet, Take 1-2 tablets by mouth every 6 hours as needed for Pain for up to 180 days. Intended supply: 7 days. Take lowest dose possible to manage pain, Disp: 120 tablet, Rfl: 5    loratadine-pseudoephedrine ([employer] ALLERGY RELIEF-D12) 5-120 MG per extended release tablet, TAKE 1 TABLET BY MOUTH TWICE A DAY, Disp: 60 tablet, Rfl: 5    Blood Glucose Monitoring Suppl (TRUE METRIX METER) PUJA, Test daily and as needed, Disp: 1 each, Rfl: 0    blood glucose test strips (ASCENSIA AUTODISC VI;ONE TOUCH ULTRA TEST VI) strip, 1 each by In Vitro route daily As needed. , Disp: 100 each, Rfl: 3    TRUEplus Lancets 33G MISC, Use daily, Disp: 100 each, Rfl: 3    Blood Glucose Calibration (TRUE METRIX LEVEL 1) Low SOLN, use as needed, Disp: 1 each, Rfl: 2    lidocaine 4 % external patch, Place 2 patches onto the skin daily, Disp: 60 patch, Rfl: 1    Loratadine-Pseudoephedrine (LORATADINE-D 12HR PO), Take 1 tablet by mouth daily, Disp: , Rfl:     hydrOXYzine (ATARAX) 10 MG tablet, TAKE 1 TO 3 TABLETS BY MOUTH 3 TIMES DAILY AS NEEDED FOR ITCHING, Disp: 90 tablet, Rfl: 1    sodium chloride (OCEAN, BABY AYR) 0.65 % nasal spray, 1 spray by Nasal route as needed for Congestion, Disp: , Rfl:     diclofenac sodium (VOLTAREN) 1 % GEL, Apply 2 g topically 4 times daily, Disp: 2 Tube, Rfl: 1    INSULIN SYRINGE 1CC/29G 29G X 1/2\" 1 ML MISC, 1 each by Does not apply route 2 times daily, Disp: 100 each, Rfl: 5    FLUTICASONE FUROATE IN, Inhale into the lungs as needed, Disp: , Rfl:     Multiple Vitamins-Minerals (MULTIVITAMIN ADULT PO), Take by mouth daily, Disp: , Rfl:     OXYGEN, Inhale 3 L into the lungs, Disp: , Rfl:     Nintedanib Esylate (OFEV) 100 MG CAPS, 100mg PO daily for 1 week, then BID for 1 week.   (Then increase to 150mg bid) (Patient not taking: Reported on 5/3/2022), Disp: 21 capsule, Rfl: 0    Nintedanib Esylate (OFEV) 150 MG CAPS, Take 150 mg by mouth 2 times daily (Patient not taking: Reported on 5/3/2022), Disp: 60 capsule, Rfl: 0    predniSONE (DELTASONE) 5 MG tablet, 20mg daily for 1 week, 10mg for 1 week, 5mg for 1 week, 2.5mg daily for 1 week (Patient not taking: Reported on 5/3/2022), Disp: 53 tablet, Rfl: 0    amitriptyline (ELAVIL) 25 MG tablet, TAKE 1 TABLET BY MOUTH EVERY DAY AT NIGHT (Patient not taking: Reported on 5/3/2022), Disp: 90 tablet, Rfl: 1    Objective:   PHYSICAL EXAM: /76 (Site: Left Upper Arm, Position: Sitting, Cuff Size: Medium Adult)   Pulse 83   SpO2 97% Comment: 4L    Constitutional:  No acute distress. Eyes: PERRL. Conjunctivae anicteric. ENT: Normal nose. Normal tongue. Neck:  Trachea is midline. No thyroid tenderness. Respiratory: No accessory muscle usage. No wheezes. LLL rales. No Rhonchi. Cardiovascular: Normal S1S2. No digit clubbing. No digit cyanosis. No LE edema. Psychiatric: No anxiety or Agitation. Alert and Oriented to person, place and time. LABS:  Reviewed any pertinent new labs that are available. 2/6/19 Immunocap , IGE 9, CBC WNL except EOs 0.9:  Bronchoscopy 6/28/2019   left upper lobe, transbronchial biopsies:  - Fragments of benign bronchial wall. - Negative for granulomas or other significant inflammation.  - Negative for malignancy. A. Right Paratracheal Lymph Node, Transbronchial Fine Needle Aspirate:       -  No malignant cells identified. B. Subcarinal Lymph Node, Transbronchial Fine Needle Aspirate:       -  No malignant cells identified. C. Lung, Left Upper Lobe, Bronchoalveolar Lavage:       -  No malignant cells identified. D. Lung, Bronchial Washings:       -  No malignant cells identified.     Subcarinal TB NA no phenotypically abnormal cells  AFB negative  Eosinophils 6%     CRP 56.9  AXEL negative  ANCA neg  HP panel neg    8/9/19 LHC/RHC: no CAD, PCWP 7, PA mean 19    8/21/19 Cryobiopsy at [facility]:  A.   Left lung, upper lobe, cryobiopsy:  - Alveolated lung parenchyma with organizing pneumonia pattern of injury.   - Interstitial expansion by chronic inflammation and fibrosis with rare  ill-formed clusters of epithelioid histiocytes. - Negative for well-formed granulomas, giant cells, polarizable material,  atypia, and malignancy. B.   Left lung, lingula, cryobiopsy:  - Predominantly peribronchiolar lung parenchyma with interstitial fibrosis,  chronic inflammation and organizing pneumonia pattern of injury.  - Fragment of benign pleura. - Immunostains for CMV, HSV (I and II), special stains for fungal elements  (GMS) and bacterial organisms (Gram stain) are all negative. - Insitu hybridization for adenovirus is negative. - Negative for granulomas, giant cells, atypia, and malignancy. C.   Left lung, lower lobe, cryobiopsy:  - Alveolated lung parenchyma with organizing pneumonia pattern of injury. - Interstitial expansion by chronic inflammation and fibrosis with rare  ill-formed clusters of epithelioid histiocytes. - Negative for well-formed  granulomas, giant cells, atypia, and  malignancy. Comment: History of COPD and imaging findings of progressive diffuse ground glass opacities with traction bronchiectasis with differential diagnosis of organizing pneumonia is noted. The current biopsy specimen reveals a predominant pattern of organizing  pneumonia (upper and lower lobes > lingula). Given the history of multiple  recurrent episodes of pneumonia requiring hostpitalization, the morphologic  findings best fit a resolving acute lung injury, with organizing pneumonia  and reactive pneumocyte proliferation. The differential diagnosis for this  pattern of injury  include infectious etiology (immunostain for HSV, CMV,  insitu hybridization for adenovirus and special stains for bacterial and  fungal organisms are all negative),residuum of prior acute  bronchopneumonia, toxic exposure, connective tissue disease or drug related  lung injury which when excluded, a cryptogenic organizing pneumonia may be  considered.  However, in addition to an organizing pneumonia pattern of  injury (predominant pattern within this specimen), there is a component of  chronic interstitial fibroinflammatory process comprising lymphocytes,  plasma cells and histiocytes with occasional foci of interstitial poorly  formed aggregates of epithelioid histiocytes. These findings raise concern  of an underlying chronic fibro-inflammatory injury the differential  diagnosis includes chronic hypersensitivity pneumonitis or NSIP. A treated  vasculitis and treated eosinophilic pneumonia are in the differential and  cannot be entirely excluded. Final Diagnosis performed by  Collins Lopez MD    Second opinion on path from [facility] [place] canned into the chart    12/24/19 BAL neg    PFTs 9/24/18 at [facility] were inconclusive due to excessive variability in her results. There was not an obstructive ratio however.    3/1/19   FVC  (62%) FEV1 1.51 (64%) FEV1/FVC ratio 0.79   TLC  (66%)  RV  (%)   DLCO (43 corrects to 86%)  ERV 34%   MCT Negative    RHC done 8/2019 that showed a normal wedge pressure excluding pulmonary edema as a cause of her pulmonary disease    IMAGING:  I personally reviewed and interpreted the following imaging today in the office:   3/15/19 HRCT:   Mild mosaic attenuation throughout the lungs on expiratory images, compatible   with small airways disease or air trapping.       There are few subpleural irregular opacities throughout the lungs   bilaterally, in the apices, and in the lower lobes.  Changes appears similar   compared to prior, and could be due to scarring or mild nonspecific fibrosis.       Mild fusiform bronchiectasis right lower lobe, and traction bronchiectasis   right middle lobe.       Scattered areas of bronchial wall thickening are seen     6/16/19 Chest CT:   Moderate multifocal airspace opacity involving all segments of both   lungs-moderately worse over the past 2 weeks.  Multifocal pneumonia is   favored.  Asymmetric pulmonary edema superimposed upon COPD possible but less   likely.  Atypical pneumonia might be considered clinically. 8/9/19 hrct     Pulmonary parenchymal findings most compatible with organizing pneumonia. Since 03/08/2019, there has been progressive ground-glass abnormality with   traction bronchiolectasis.       Unchanged mediastinal lymphadenopathy, presumably reactive. 11/7/19 CTPA:   1. No evidence of acute pulmonary embolism or acute aortic dissection or   aneurysm. 2. Chronic changes of emphysema as well as interstitial lung disease and mild   bronchiectatic changes.  No active pleural disease. 3. Redemonstration of borderline left hilar lymphadenopathy. Chest CT 1/12/21:   Lungs/pleura: The central airways are patent. Scattered areas of ground-glass   type opacity with associated architectural distortion and irregular   subpleural opacities are not significantly changed when compared to the most   recent exam. [place] scattered areas of traction bronchiectasis are similar   to the previous.  When compared to the more remote exam there is overall   improvement of ground-glass opacity which may well have been related to   superimposed infiltrate. 6/18/2021 Chest CT:   Stable fibrotic changes throughout the lungs with stable small mediastinal   nodes. Cirrhotic appearing liver     4/18/22 Chest CT:   Mediastinum: No thoracic aortic aneurysm is identified.  Atherosclerosis is   noted.  Mitral annular calcification is identified.  Cardiomegaly.  No   significant pericardial effusion is identified.  Mediastinal lymphadenopathy   is seen, increased from the previous examination.  No focal esophageal   thickening is identified.       Lungs/pleura: No pleural effusion is seen.  Multifocal areas of traction   bronchiectasis, ground-glass change and reticular changes are seen scattered   throughout the lungs bilaterally.  The degree of involvement has worsened   when compared to the previous examination.  No focal spiculated lung mass is   identified.  Central airways appear patent.     Upper Abdomen: No acute abnormality identified in the upper abdomen. Surgical clips are seen in the gallbladder fossa.  No acute inflammatory   bowel process is identified.  Mild subcapsular nodularity seen in the liver   suggestive of underlying cirrhosis.       Soft Tissues/Bones: No axillary or supraclavicular lymphadenopathy is   identified.  During scan acquisition, the patient was breathing at the time   the lower chest was scanned, which is causing motion artifact involving the   5th, 6th, 7th, 8th, and 9th ribs in the area of clinical concern for possible   fracture based on radiographs.           Impression   1. In the area of concern radiographically for left-sided rib fractures, the   patient was breathing during scan acquisition, causing respiratory motion   artifact at the level of ribs laterally, including the 5th, 6th, 7th, 8th,   and 9th ribs mimicking fractures that may not be present.  The 7th and 9th   ribs may be fractured, though the degree of motion artifact makes certainty   difficult. 2. Extensive interstitial chronic fibrotic changes are seen, which has been   imaged multiple times dating back several years, though the degree of   fibrotic changes and interstitial prominence as well as ground-glass change   has increased when compared to the previous examination.  This could be   related to progression of disease or possibly related to superimposed edema   or pneumonia.  If further characterization is desired, this could be assessed   with a non emergent high-resolution CT imaging study of the chest.   3. Lymphadenopathy is identified in the mediastinum, which has mildly   progressed when compared to the previous examination suggesting potential   reactive change raising the question of a possible acute interstitial   pneumonitis with reactive lymphadenopathy. 4. Other incidental findings as above.          ASSESSMENT:  · Based on the biopsies, I favor a h/o of cryptogenic organizing pneumonia and possibly coexisting NSIP or chronic HP. · After a period of stability she now seems to have the phenotype of Progressive fibrosing lung disease  · Chronic hypoxic respiratory failure -resolved  · Cough and Fever had been intermittently associated with her condition  · Restrictive lung disease  · Chronic Rhinitis with PND   · Obesity  · Afib     PLAN:   · Pt still trying to get assistance with esbriet. Unable to afford ofev. · Check LFT  · Continue Mucinex  · Start Acapella  · PRN tessalon pearles  · In the past she completed Prednisone 40mg for 4-6 weeks, 30mg for 1 month, 20mg for 1 month, 15mg for 6 weeks, 10mg for 6 weeks, 5mg for 8 weeks, 2.5mg daily for 2 weeks and  2.5mg QOD for 4 weeks. Stopped Prednisone 5/2020  · Multiple shorter steroid tapers and most recently completed Prednisone 30mg daily for 1 week, 10mg for 1 week,  5mg for 1 week  · Continue sinus regimen: flonase and Claritin   · The pt is on PRN Lasix  · PRN albuterol and albuterol nebs  · Mold remediated  · If the pt is unable to start Ofev or Esbriet and has another flare then I will plan a prednisone taper and then keep her on 10 mg of prednisone chronically and then consider a steroid sparing agent such as CellCept or Imuran. · I informed the patient and her family that I will be transitioning out of office practice. The patient is agreeable to her care being assumed by another physician in the office. I also let the patient know she may need to be referred to an ILD subspecialist in the future.

## 2022-05-04 ENCOUNTER — TELEPHONE (OUTPATIENT)
Dept: FAMILY MEDICINE CLINIC | Age: 67
End: 2022-05-04

## 2022-05-04 RX ORDER — TRAMADOL HYDROCHLORIDE 50 MG/1
TABLET ORAL
Qty: 56 TABLET | OUTPATIENT
Start: 2022-05-04

## 2022-05-04 NOTE — TELEPHONE ENCOUNTER
Home health called to get verbal for Physical therapy 1 x weekly for 8 weeks   [phone] Soniya Calderon can leave voicemail on secured line      Also wanted you to be aware of the following. ...   Drug interactions noted when entered medications in their system  Amitriptyline and potassium chloride    amitriptyline and tramadol    hydroxyzine and potassium

## 2022-05-04 NOTE — TELEPHONE ENCOUNTER
Gustavo Cortez with [facility] stopped by office states they have been trying to get in contact with patient but has been unsuccessful. Gustavo Cortez gave me a direct line for patient to reach out [phone].  I spoke with Erik Cordero whom was informed states they will call to follow up with company

## 2022-05-05 ENCOUNTER — TELEPHONE (OUTPATIENT)
Dept: FAMILY MEDICINE CLINIC | Age: 67
End: 2022-05-05

## 2022-05-05 NOTE — TELEPHONE ENCOUNTER
Home care physical therapy called stated patient refused physical therapy for this week. She is in too much pain with her rib fracture.   Also gave verbal consent for therapy he will relay to [employer]

## 2022-05-06 ENCOUNTER — HOSPITAL ENCOUNTER (INPATIENT)
Age: 67
LOS: 6 days | Discharge: HOME | DRG: 177 | End: 2022-05-12
Attending: EMERGENCY MEDICINE | Admitting: INTERNAL MEDICINE
Payer: MEDICARE

## 2022-05-06 ENCOUNTER — APPOINTMENT (OUTPATIENT)
Dept: CT IMAGING | Age: 67
DRG: 177 | End: 2022-05-06
Payer: MEDICARE

## 2022-05-06 ENCOUNTER — APPOINTMENT (OUTPATIENT)
Dept: GENERAL RADIOLOGY | Age: 67
DRG: 177 | End: 2022-05-06
Payer: MEDICARE

## 2022-05-06 DIAGNOSIS — J96.21 ACUTE ON CHRONIC RESPIRATORY FAILURE WITH HYPOXIA (HCC): Primary | ICD-10-CM

## 2022-05-06 DIAGNOSIS — J84.9 ILD (INTERSTITIAL LUNG DISEASE) (HCC): ICD-10-CM

## 2022-05-06 DIAGNOSIS — J18.9 PNEUMONIA OF BOTH LOWER LOBES DUE TO INFECTIOUS ORGANISM: ICD-10-CM

## 2022-05-06 DIAGNOSIS — L29.9 ITCHING: ICD-10-CM

## 2022-05-06 LAB
A/G RATIO: 0.8 (ref 1.1–2.2)
ALBUMIN SERPL-MCNC: 3.3 G/DL (ref 3.4–5)
ALBUMIN SERPL-MCNC: 3.6 G/DL (ref 3.4–5)
ALP BLD-CCNC: 91 U/L (ref 40–129)
ALT SERPL-CCNC: 16 U/L (ref 10–40)
ANION GAP SERPL CALCULATED.3IONS-SCNC: 12 MMOL/L (ref 3–16)
ANION GAP SERPL CALCULATED.3IONS-SCNC: 17 MMOL/L (ref 3–16)
AST SERPL-CCNC: 20 U/L (ref 15–37)
BASE EXCESS VENOUS: -1.3 MMOL/L (ref -3–3)
BASOPHILS ABSOLUTE: 0.1 K/UL (ref 0–0.2)
BASOPHILS RELATIVE PERCENT: 1 %
BILIRUB SERPL-MCNC: 0.5 MG/DL (ref 0–1)
BUN BLDV-MCNC: 14 MG/DL (ref 7–20)
BUN BLDV-MCNC: 17 MG/DL (ref 7–20)
CALCIUM SERPL-MCNC: 8.7 MG/DL (ref 8.3–10.6)
CALCIUM SERPL-MCNC: 8.9 MG/DL (ref 8.3–10.6)
CARBOXYHEMOGLOBIN: 1.5 % (ref 0–1.5)
CHLORIDE BLD-SCNC: 101 MMOL/L (ref 99–110)
CHLORIDE BLD-SCNC: 96 MMOL/L (ref 99–110)
CO2: 23 MMOL/L (ref 21–32)
CO2: 25 MMOL/L (ref 21–32)
CREAT SERPL-MCNC: 0.6 MG/DL (ref 0.6–1.2)
CREAT SERPL-MCNC: 0.8 MG/DL (ref 0.6–1.2)
EKG ATRIAL RATE: 84 BPM
EKG DIAGNOSIS: NORMAL
EKG P AXIS: 36 DEGREES
EKG P-R INTERVAL: 156 MS
EKG Q-T INTERVAL: 396 MS
EKG QRS DURATION: 82 MS
EKG QTC CALCULATION (BAZETT): 467 MS
EKG R AXIS: 17 DEGREES
EKG T AXIS: 7 DEGREES
EKG VENTRICULAR RATE: 84 BPM
EOSINOPHILS ABSOLUTE: 0.5 K/UL (ref 0–0.6)
EOSINOPHILS RELATIVE PERCENT: 4.4 %
GFR AFRICAN AMERICAN: >60
GFR AFRICAN AMERICAN: >60
GFR NON-AFRICAN AMERICAN: >60
GFR NON-AFRICAN AMERICAN: >60
GLUCOSE BLD-MCNC: 119 MG/DL (ref 70–99)
GLUCOSE BLD-MCNC: 122 MG/DL (ref 70–99)
GLUCOSE BLD-MCNC: 124 MG/DL (ref 70–99)
GLUCOSE BLD-MCNC: 128 MG/DL (ref 70–99)
HCO3 VENOUS: 25.2 MMOL/L (ref 23–29)
HCT VFR BLD CALC: 32.1 % (ref 36–48)
HEMOGLOBIN: 9.9 G/DL (ref 12–16)
LACTIC ACID, SEPSIS: 0.9 MMOL/L (ref 0.4–1.9)
LYMPHOCYTES ABSOLUTE: 1.4 K/UL (ref 1–5.1)
LYMPHOCYTES RELATIVE PERCENT: 12.3 %
MAGNESIUM: 2 MG/DL (ref 1.8–2.4)
MCH RBC QN AUTO: 23.5 PG (ref 26–34)
MCHC RBC AUTO-ENTMCNC: 31 G/DL (ref 31–36)
MCV RBC AUTO: 76 FL (ref 80–100)
METHEMOGLOBIN VENOUS: 0.3 %
MONOCYTES ABSOLUTE: 0.4 K/UL (ref 0–1.3)
MONOCYTES RELATIVE PERCENT: 3.9 %
NEUTROPHILS ABSOLUTE: 8.8 K/UL (ref 1.7–7.7)
NEUTROPHILS RELATIVE PERCENT: 78.4 %
O2 SAT, VEN: 98 %
O2 THERAPY: ABNORMAL
PCO2, VEN: 50.2 MMHG (ref 40–50)
PDW BLD-RTO: 18.2 % (ref 12.4–15.4)
PERFORMED ON: ABNORMAL
PERFORMED ON: ABNORMAL
PH VENOUS: 7.32 (ref 7.35–7.45)
PHOSPHORUS: 3.6 MG/DL (ref 2.5–4.9)
PLATELET # BLD: 295 K/UL (ref 135–450)
PMV BLD AUTO: 6.5 FL (ref 5–10.5)
PO2, VEN: 112.8 MMHG (ref 25–40)
POTASSIUM REFLEX MAGNESIUM: 3.7 MMOL/L (ref 3.5–5.1)
POTASSIUM SERPL-SCNC: 4 MMOL/L (ref 3.5–5.1)
PRO-BNP: 7611 PG/ML (ref 0–124)
RAPID INFLUENZA  B AGN: NEGATIVE
RAPID INFLUENZA A AGN: NEGATIVE
RBC # BLD: 4.22 M/UL (ref 4–5.2)
SARS-COV-2, NAAT: NOT DETECTED
SODIUM BLD-SCNC: 136 MMOL/L (ref 136–145)
SODIUM BLD-SCNC: 138 MMOL/L (ref 136–145)
TCO2 CALC VENOUS: 27 MMOL/L
TOTAL PROTEIN: 7.2 G/DL (ref 6.4–8.2)
TROPONIN: <0.01 NG/ML
WBC # BLD: 11.2 K/UL (ref 4–11)

## 2022-05-06 PROCEDURE — 36415 COLL VENOUS BLD VENIPUNCTURE: CPT

## 2022-05-06 PROCEDURE — 83880 ASSAY OF NATRIURETIC PEPTIDE: CPT

## 2022-05-06 PROCEDURE — 87635 SARS-COV-2 COVID-19 AMP PRB: CPT

## 2022-05-06 PROCEDURE — 83550 IRON BINDING TEST: CPT

## 2022-05-06 PROCEDURE — 80053 COMPREHEN METABOLIC PANEL: CPT

## 2022-05-06 PROCEDURE — 2580000003 HC RX 258

## 2022-05-06 PROCEDURE — 6370000000 HC RX 637 (ALT 250 FOR IP): Performed by: INTERNAL MEDICINE

## 2022-05-06 PROCEDURE — 83036 HEMOGLOBIN GLYCOSYLATED A1C: CPT

## 2022-05-06 PROCEDURE — 87804 INFLUENZA ASSAY W/OPTIC: CPT

## 2022-05-06 PROCEDURE — 2700000000 HC OXYGEN THERAPY PER DAY

## 2022-05-06 PROCEDURE — 85025 COMPLETE CBC W/AUTO DIFF WBC: CPT

## 2022-05-06 PROCEDURE — 83540 ASSAY OF IRON: CPT

## 2022-05-06 PROCEDURE — 83735 ASSAY OF MAGNESIUM: CPT

## 2022-05-06 PROCEDURE — 96375 TX/PRO/DX INJ NEW DRUG ADDON: CPT

## 2022-05-06 PROCEDURE — 2060000000 HC ICU INTERMEDIATE R&B

## 2022-05-06 PROCEDURE — 6360000002 HC RX W HCPCS

## 2022-05-06 PROCEDURE — 87205 SMEAR GRAM STAIN: CPT

## 2022-05-06 PROCEDURE — 6370000000 HC RX 637 (ALT 250 FOR IP)

## 2022-05-06 PROCEDURE — 99285 EMERGENCY DEPT VISIT HI MDM: CPT

## 2022-05-06 PROCEDURE — 94640 AIRWAY INHALATION TREATMENT: CPT

## 2022-05-06 PROCEDURE — 84484 ASSAY OF TROPONIN QUANT: CPT

## 2022-05-06 PROCEDURE — 96365 THER/PROPH/DIAG IV INF INIT: CPT

## 2022-05-06 PROCEDURE — 6370000000 HC RX 637 (ALT 250 FOR IP): Performed by: EMERGENCY MEDICINE

## 2022-05-06 PROCEDURE — 71260 CT THORAX DX C+: CPT

## 2022-05-06 PROCEDURE — 82803 BLOOD GASES ANY COMBINATION: CPT

## 2022-05-06 PROCEDURE — 93010 ELECTROCARDIOGRAM REPORT: CPT | Performed by: INTERNAL MEDICINE

## 2022-05-06 PROCEDURE — 6360000004 HC RX CONTRAST MEDICATION: Performed by: EMERGENCY MEDICINE

## 2022-05-06 PROCEDURE — 94761 N-INVAS EAR/PLS OXIMETRY MLT: CPT

## 2022-05-06 PROCEDURE — 93005 ELECTROCARDIOGRAM TRACING: CPT | Performed by: EMERGENCY MEDICINE

## 2022-05-06 PROCEDURE — 83605 ASSAY OF LACTIC ACID: CPT

## 2022-05-06 PROCEDURE — 71045 X-RAY EXAM CHEST 1 VIEW: CPT

## 2022-05-06 PROCEDURE — 87040 BLOOD CULTURE FOR BACTERIA: CPT

## 2022-05-06 PROCEDURE — 6360000002 HC RX W HCPCS: Performed by: EMERGENCY MEDICINE

## 2022-05-06 RX ORDER — FUROSEMIDE 40 MG/1
40 TABLET ORAL PRN
Status: ON HOLD | COMMUNITY
End: 2022-05-08 | Stop reason: HOSPADM

## 2022-05-06 RX ORDER — INSULIN LISPRO 100 [IU]/ML
0-6 INJECTION, SOLUTION INTRAVENOUS; SUBCUTANEOUS EVERY 4 HOURS
Status: DISCONTINUED | OUTPATIENT
Start: 2022-05-06 | End: 2022-05-07

## 2022-05-06 RX ORDER — ATORVASTATIN CALCIUM 40 MG/1
40 TABLET, FILM COATED ORAL DAILY
Status: DISCONTINUED | OUTPATIENT
Start: 2022-05-06 | End: 2022-05-12 | Stop reason: HOSPADM

## 2022-05-06 RX ORDER — NALOXONE HYDROCHLORIDE 4 MG/.1ML
1 SPRAY NASAL PRN
COMMUNITY
Start: 2022-04-21 | End: 2022-05-06

## 2022-05-06 RX ORDER — SERTRALINE HYDROCHLORIDE 100 MG/1
200 TABLET, FILM COATED ORAL DAILY
Status: DISCONTINUED | OUTPATIENT
Start: 2022-05-06 | End: 2022-05-12 | Stop reason: HOSPADM

## 2022-05-06 RX ORDER — LORAZEPAM 0.5 MG/1
0.5 TABLET ORAL EVERY 6 HOURS PRN
Status: ON HOLD | COMMUNITY
End: 2022-09-01 | Stop reason: HOSPADM

## 2022-05-06 RX ORDER — DEXTROSE MONOHYDRATE 50 MG/ML
100 INJECTION, SOLUTION INTRAVENOUS PRN
Status: DISCONTINUED | OUTPATIENT
Start: 2022-05-06 | End: 2022-05-12 | Stop reason: HOSPADM

## 2022-05-06 RX ORDER — BENZONATATE 100 MG/1
200 CAPSULE ORAL 3 TIMES DAILY PRN
Status: DISCONTINUED | OUTPATIENT
Start: 2022-05-06 | End: 2022-05-12 | Stop reason: HOSPADM

## 2022-05-06 RX ORDER — HYDROXYZINE HYDROCHLORIDE 10 MG/1
10 TABLET, FILM COATED ORAL 3 TIMES DAILY PRN
Status: DISCONTINUED | OUTPATIENT
Start: 2022-05-06 | End: 2022-05-10

## 2022-05-06 RX ORDER — POLYETHYLENE GLYCOL 3350 17 G/17G
17 POWDER, FOR SOLUTION ORAL DAILY PRN
Status: DISCONTINUED | OUTPATIENT
Start: 2022-05-06 | End: 2022-05-12 | Stop reason: HOSPADM

## 2022-05-06 RX ORDER — NADOLOL 40 MG/1
20 TABLET ORAL DAILY
Status: DISCONTINUED | OUTPATIENT
Start: 2022-05-06 | End: 2022-05-12 | Stop reason: HOSPADM

## 2022-05-06 RX ORDER — FLUTICASONE PROPIONATE 50 MCG
2 SPRAY, SUSPENSION (ML) NASAL DAILY
Status: DISCONTINUED | OUTPATIENT
Start: 2022-05-06 | End: 2022-05-12 | Stop reason: HOSPADM

## 2022-05-06 RX ORDER — PSEUDOEPHEDRINE HCL 120 MG/1
120 TABLET, FILM COATED, EXTENDED RELEASE ORAL 2 TIMES DAILY
Status: DISCONTINUED | OUTPATIENT
Start: 2022-05-06 | End: 2022-05-10

## 2022-05-06 RX ORDER — ENOXAPARIN SODIUM 100 MG/ML
40 INJECTION SUBCUTANEOUS DAILY
Status: DISCONTINUED | OUTPATIENT
Start: 2022-05-06 | End: 2022-05-12 | Stop reason: HOSPADM

## 2022-05-06 RX ORDER — ACETAMINOPHEN 325 MG/1
TABLET ORAL
COMMUNITY
Start: 2022-04-21

## 2022-05-06 RX ORDER — ALBUTEROL SULFATE 2.5 MG/3ML
2.5 SOLUTION RESPIRATORY (INHALATION) EVERY 6 HOURS PRN
Status: DISCONTINUED | OUTPATIENT
Start: 2022-05-06 | End: 2022-05-12 | Stop reason: HOSPADM

## 2022-05-06 RX ORDER — ALBUTEROL SULFATE 90 UG/1
2 AEROSOL, METERED RESPIRATORY (INHALATION) EVERY 6 HOURS PRN
Status: DISCONTINUED | OUTPATIENT
Start: 2022-05-06 | End: 2022-05-06 | Stop reason: SDUPTHER

## 2022-05-06 RX ORDER — SODIUM CHLORIDE 9 MG/ML
INJECTION, SOLUTION INTRAVENOUS PRN
Status: DISCONTINUED | OUTPATIENT
Start: 2022-05-06 | End: 2022-05-12 | Stop reason: HOSPADM

## 2022-05-06 RX ORDER — DEXTROSE MONOHYDRATE 25 G/50ML
12.5 INJECTION, SOLUTION INTRAVENOUS PRN
Status: DISCONTINUED | OUTPATIENT
Start: 2022-05-06 | End: 2022-05-06

## 2022-05-06 RX ORDER — LEVOFLOXACIN 5 MG/ML
750 INJECTION, SOLUTION INTRAVENOUS ONCE
Status: COMPLETED | OUTPATIENT
Start: 2022-05-06 | End: 2022-05-06

## 2022-05-06 RX ORDER — LEVOTHYROXINE SODIUM 0.12 MG/1
125 TABLET ORAL DAILY
Status: DISCONTINUED | OUTPATIENT
Start: 2022-05-06 | End: 2022-05-12 | Stop reason: HOSPADM

## 2022-05-06 RX ORDER — ACETAMINOPHEN 325 MG/1
650 TABLET ORAL EVERY 6 HOURS PRN
Status: DISCONTINUED | OUTPATIENT
Start: 2022-05-06 | End: 2022-05-12 | Stop reason: HOSPADM

## 2022-05-06 RX ORDER — NALOXONE HYDROCHLORIDE 4 MG/.1ML
SPRAY NASAL
Status: ON HOLD | COMMUNITY
Start: 2022-04-21 | End: 2022-08-16

## 2022-05-06 RX ORDER — POTASSIUM CHLORIDE 750 MG/1
20 TABLET, FILM COATED, EXTENDED RELEASE ORAL DAILY PRN
Status: ON HOLD | COMMUNITY
End: 2022-09-01 | Stop reason: SDUPTHER

## 2022-05-06 RX ORDER — GUAIFENESIN 600 MG/1
1200 TABLET, EXTENDED RELEASE ORAL EVERY MORNING
Status: DISCONTINUED | OUTPATIENT
Start: 2022-05-07 | End: 2022-05-12 | Stop reason: HOSPADM

## 2022-05-06 RX ORDER — SODIUM CHLORIDE 0.9 % (FLUSH) 0.9 %
5-40 SYRINGE (ML) INJECTION PRN
Status: DISCONTINUED | OUTPATIENT
Start: 2022-05-06 | End: 2022-05-12 | Stop reason: HOSPADM

## 2022-05-06 RX ORDER — ACETAMINOPHEN 650 MG/1
650 SUPPOSITORY RECTAL EVERY 6 HOURS PRN
Status: DISCONTINUED | OUTPATIENT
Start: 2022-05-06 | End: 2022-05-12 | Stop reason: HOSPADM

## 2022-05-06 RX ORDER — ONDANSETRON 2 MG/ML
4 INJECTION INTRAMUSCULAR; INTRAVENOUS EVERY 6 HOURS PRN
Status: DISCONTINUED | OUTPATIENT
Start: 2022-05-06 | End: 2022-05-12 | Stop reason: HOSPADM

## 2022-05-06 RX ORDER — IPRATROPIUM BROMIDE AND ALBUTEROL SULFATE 2.5; .5 MG/3ML; MG/3ML
1 SOLUTION RESPIRATORY (INHALATION) 3 TIMES DAILY
Status: DISCONTINUED | OUTPATIENT
Start: 2022-05-06 | End: 2022-05-07

## 2022-05-06 RX ORDER — MIRTAZAPINE 30 MG/1
30 TABLET, FILM COATED ORAL NIGHTLY
Status: DISCONTINUED | OUTPATIENT
Start: 2022-05-06 | End: 2022-05-10

## 2022-05-06 RX ORDER — POTASSIUM CHLORIDE 750 MG/1
20 TABLET, EXTENDED RELEASE ORAL DAILY
Status: DISCONTINUED | OUTPATIENT
Start: 2022-05-06 | End: 2022-05-07

## 2022-05-06 RX ORDER — GUAIFENESIN 600 MG/1
1200 TABLET, EXTENDED RELEASE ORAL EVERY MORNING
COMMUNITY

## 2022-05-06 RX ORDER — SODIUM CHLORIDE 0.9 % (FLUSH) 0.9 %
5-40 SYRINGE (ML) INJECTION EVERY 12 HOURS SCHEDULED
Status: DISCONTINUED | OUTPATIENT
Start: 2022-05-06 | End: 2022-05-12 | Stop reason: HOSPADM

## 2022-05-06 RX ORDER — CETIRIZINE HYDROCHLORIDE 10 MG/1
10 TABLET ORAL DAILY
Status: DISCONTINUED | OUTPATIENT
Start: 2022-05-06 | End: 2022-05-10

## 2022-05-06 RX ORDER — IPRATROPIUM BROMIDE AND ALBUTEROL SULFATE 2.5; .5 MG/3ML; MG/3ML
1 SOLUTION RESPIRATORY (INHALATION)
Status: DISCONTINUED | OUTPATIENT
Start: 2022-05-06 | End: 2022-05-06

## 2022-05-06 RX ORDER — IPRATROPIUM BROMIDE AND ALBUTEROL SULFATE 2.5; .5 MG/3ML; MG/3ML
1 SOLUTION RESPIRATORY (INHALATION) ONCE
Status: COMPLETED | OUTPATIENT
Start: 2022-05-06 | End: 2022-05-06

## 2022-05-06 RX ORDER — PREDNISONE 20 MG/1
40 TABLET ORAL DAILY
Status: DISCONTINUED | OUTPATIENT
Start: 2022-05-06 | End: 2022-05-07

## 2022-05-06 RX ORDER — ONDANSETRON 4 MG/1
4 TABLET, ORALLY DISINTEGRATING ORAL EVERY 8 HOURS PRN
Status: DISCONTINUED | OUTPATIENT
Start: 2022-05-06 | End: 2022-05-12 | Stop reason: HOSPADM

## 2022-05-06 RX ORDER — M-VIT,TX,IRON,MINS/CALC/FOLIC 27MG-0.4MG
1 TABLET ORAL DAILY
Status: DISCONTINUED | OUTPATIENT
Start: 2022-05-06 | End: 2022-05-12 | Stop reason: HOSPADM

## 2022-05-06 RX ORDER — METHYLPREDNISOLONE SODIUM SUCCINATE 125 MG/2ML
125 INJECTION, POWDER, LYOPHILIZED, FOR SOLUTION INTRAMUSCULAR; INTRAVENOUS ONCE
Status: COMPLETED | OUTPATIENT
Start: 2022-05-06 | End: 2022-05-06

## 2022-05-06 RX ORDER — FUROSEMIDE 10 MG/ML
40 INJECTION INTRAMUSCULAR; INTRAVENOUS DAILY
Status: DISCONTINUED | OUTPATIENT
Start: 2022-05-06 | End: 2022-05-08

## 2022-05-06 RX ORDER — LORAZEPAM 0.5 MG/1
0.5 TABLET ORAL EVERY 6 HOURS PRN
Status: DISCONTINUED | OUTPATIENT
Start: 2022-05-06 | End: 2022-05-12 | Stop reason: HOSPADM

## 2022-05-06 RX ORDER — NICOTINE POLACRILEX 4 MG
15 LOZENGE BUCCAL PRN
Status: DISCONTINUED | OUTPATIENT
Start: 2022-05-06 | End: 2022-05-06

## 2022-05-06 RX ORDER — PANTOPRAZOLE SODIUM 40 MG/1
40 TABLET, DELAYED RELEASE ORAL
Status: DISCONTINUED | OUTPATIENT
Start: 2022-05-07 | End: 2022-05-12 | Stop reason: HOSPADM

## 2022-05-06 RX ADMIN — METHYLPREDNISOLONE SODIUM SUCCINATE 125 MG: 125 INJECTION, POWDER, FOR SOLUTION INTRAMUSCULAR; INTRAVENOUS at 10:01

## 2022-05-06 RX ADMIN — MEROPENEM 1000 MG: 1 INJECTION, POWDER, FOR SOLUTION INTRAVENOUS at 18:32

## 2022-05-06 RX ADMIN — SERTRALINE 200 MG: 100 TABLET, FILM COATED ORAL at 18:25

## 2022-05-06 RX ADMIN — ENOXAPARIN SODIUM 40 MG: 100 INJECTION SUBCUTANEOUS at 18:24

## 2022-05-06 RX ADMIN — MIRTAZAPINE 30 MG: 30 TABLET, FILM COATED ORAL at 21:47

## 2022-05-06 RX ADMIN — IOPAMIDOL 75 ML: 755 INJECTION, SOLUTION INTRAVENOUS at 12:05

## 2022-05-06 RX ADMIN — IPRATROPIUM BROMIDE AND ALBUTEROL SULFATE 1 AMPULE: .5; 3 SOLUTION RESPIRATORY (INHALATION) at 10:01

## 2022-05-06 RX ADMIN — PSEUDOEPHEDRINE HCL 120 MG: 120 TABLET, FILM COATED, EXTENDED RELEASE ORAL at 21:47

## 2022-05-06 RX ADMIN — VANCOMYCIN HYDROCHLORIDE 1250 MG: 10 INJECTION, POWDER, LYOPHILIZED, FOR SOLUTION INTRAVENOUS at 19:17

## 2022-05-06 RX ADMIN — IPRATROPIUM BROMIDE AND ALBUTEROL SULFATE 1 AMPULE: .5; 3 SOLUTION RESPIRATORY (INHALATION) at 20:25

## 2022-05-06 RX ADMIN — FUROSEMIDE 40 MG: 10 INJECTION, SOLUTION INTRAMUSCULAR; INTRAVENOUS at 18:25

## 2022-05-06 RX ADMIN — PREDNISONE 40 MG: 20 TABLET ORAL at 18:25

## 2022-05-06 RX ADMIN — ONDANSETRON HYDROCHLORIDE 4 MG: 2 INJECTION, SOLUTION INTRAMUSCULAR; INTRAVENOUS at 19:41

## 2022-05-06 RX ADMIN — LEVOFLOXACIN 750 MG: 5 INJECTION, SOLUTION INTRAVENOUS at 13:00

## 2022-05-06 RX ADMIN — POTASSIUM CHLORIDE 20 MEQ: 10 TABLET, EXTENDED RELEASE ORAL at 18:25

## 2022-05-06 ASSESSMENT — LIFESTYLE VARIABLES: HOW OFTEN DO YOU HAVE A DRINK CONTAINING ALCOHOL: NEVER

## 2022-05-06 NOTE — CARE COORDINATION
Case Management Assessment  Initial Evaluation      Patient Name: Loyda Dailey  YOB: 1955  Diagnosis: Shortness of breath [R06.02]  Acute on chronic respiratory failure with hypoxia ([address] [J96.21]  Pneumonia of both lower lobes due to infectious organism [J18.9]  Date / Time: 5/6/2022  9:13 AM    Admission status/Date:5/6/2022 inpt  Chart Reviewed: Yes      Patient Interviewed: Yes   Family Interviewed:  No      Hospitalization in the last 30 days:  No      Health Care Decision Maker :   Primary Decision Maker: Walter Hammer - Spouse - [phone]    Secondary Decision Maker: [facility] [address] [place] [address] [phone]    (CM - must 1st enter selection under Navigator - emergency contact- [place] Relationship and pick relationship)   Who do you trust or have selected to make healthcare decisions for you      Met with: pt  Interview conducted  (bedside/phone): bedside    Current PCP: Dona Schultz MD      Financial  [employer] Medicare  Precert required for SNF : Y, N          3 night stay required - Y, N    ADLS  Support Systems/Care Needs:    Transportation: spouse    Meal Preparation: self    Housing  Living Arrangements: ranch with spouse  Steps: 2  Intent for return to present living arrangements: Yes  Identified Issues: [id number] [facility] with [address] : No Agency:(Services)     Passport/Waiver : No  :                      Phone Number:    Passport/Waiver Services: n/a          Durable Medical Equiptment   DME Provider: Kay[facility] for home O2 at 2L baseline.    Equipment: yes  Walker___Cane___RTS___ BSC___Shower Chair__X_Hospital Bed___W/C____Other________  02 at __3__Liter(s)---wears(frequency)__continuous_____ [facility] _X__ CPAP___ BiPap___   N/A____      Home O2 Use :  Yes    If No for home O2---if presently on O2 during hospitalization:  Yes  if yes CM to follow for potential DC O2 need  Informed of need for care provider to bring portable home O2 tank on day of discharge for nursing to connect prior to leaving:   Yes  Verbalized agreement/Understanding:   Yes    Community Service Affiliation  Dialysis:  No    · Agency:  · Location:  · Dialysis Schedule:  · Phone:   · Fax: Other Community Services: (ex:PT/OT,Mental Health,Wound Clinic, Cardio/Pul [address])    DISCHARGE PLAN: Explained Case Management role/services. Reviewed chart. Role of discharge planner explained and patient verbalized understanding. Pt states that she lives in a ranch with spouse and plans to return. Pt is active with [employer] for home O2 at 3L baseline. CM called [employer] [facility] ([phone]) and spoke with Marisa. Per Marisa, pt has 2L continuous O2 order with a 5L concentrator. If pt needs more than 2L, then she will need a walk test and the new order faxed to [employer] ([phone]) .

## 2022-05-06 NOTE — ED NOTES
Mita at the transfer center is paging the hospitalist at [address] [place] [address].       Mala Galarza  05/06/22 [id number]

## 2022-05-06 NOTE — CONSULTS
IV Vanco per Rx  IV Vanco added to provide Gram+ organism coverage to include MRSA for HAP. Give a 1250mg dose x1 now followed by 750mg IV Q12hrs. Trough 5/8 at 0500.   [place] Chopra [facility] PharmD 5/6/2022 5:56 PM

## 2022-05-06 NOTE — PROGRESS NOTES
Verified patient home med list with list patient had from home. Patient is a poor historian. Patient states she does her medications herself, but does not know what she takes. Patient does not drive, they order things online and if something needs picked up her  or family get it. Patient uses 3L O2 at home and a walker. Patient states she hasn't left the house in 3 years due to illness. Patient has a bed alarm in place. She has has recent falls with injury, and states she's felt confused today.

## 2022-05-06 NOTE — ED PROVIDER NOTES
[facility] Emergency Department      CHIEF COMPLAINT  Respiratory Distress (Pt presents to the ED from home c/o resp distress. pt states that on 4/17 she fell and fractured L ribs. Pt states that since then she has required increased o2 through [place]. Pt hypoxic on arrival. )      HISTORY OF PRESENT ILLNESS  Swapna Torres is a 69 y.o. female with a history of cryptogenic organizing pneumonia, also with A. fib and chronic hypoxic respiratory failure on 3 L of nasal cannula oxygen at baseline presents with shortness of breath. She states she started feeling short of breath yesterday evening and had to get up through the night because she felt she could not breathe. She called EMS this morning and on their arrival her sats were 81% on 5 L nasal cannula. They did mention that she had about 50 feet of oxygen tubing. She was recently seen on April 18 with shortness of breath, hypoxia after a fall at home. She was found to have multiple left-sided rib fractures and was transferred to . She did not have a pneumothorax. After admission there she was able to be discharged on her home 3 L of oxygen. She has been using fentanyl patches. She was placed on a prednisone taper for her cryptogenic organizing pneumonia and pulmonary fibrosis. She just finished this last week. She sees Dr. Bin Degroot with pulmonology. She denies chest pain. She states she does continue to have some mild cough. No fevers. No lower extremity swelling. She denies any history of congestive heart failure. No other complaints, modifying factors or associated symptoms. I have reviewed the following from the nursing documentation.     Past Medical History:   Diagnosis Date    [facility]fib ([facility]     Anxiety     Cryptogenic organizing pneumonia ([facility]     Depression     GERD (gastroesophageal reflux disease)     Hyperlipidemia     On home oxygen therapy     uses O2 3L prn    Rash     Thyroid disease     hypothyroidism     Past Surgical History:   Procedure Laterality Date    ARM SURGERY Left 3/2/2020    LEFT ULNAR NERVE DECOMPRESSION AT THE ELBOW performed by Jona Goncalves MD at [address] [place] 6/27/2019    EBUS WF W/ANES.  performed by Nisa Quiñones MD at [address]  6/27/2019    BRONCHOSCOPY/TRANSBRONCHIAL NEEDLE BIOPSY performed by Nisa Quiñones MD at [address]  6/27/2019    BRONCHOSCOPY/TRANSBRONCHIAL LUNG BIOPSY performed by Nisa Quiñones MD at [address]  6/27/2019    BRONCHOSCOPY ALVEOLAR LAVAGE performed by Nisa Quiñones MD at [address]  07/10/2019    BRONCHOSCOPY N/A 7/10/2019    BRONCHOSCOPY ALVEOLAR LAVAGE performed by Bryce Ochoa MD at [address] Bilateral 08/06/2019    BRONCHOSCOPY N/A 8/6/2019    BRONCHOSCOPY ALVEOLAR LAVAGE performed by Senthil Mccallum MD at [address] N/A 12/24/2019    BRONCHOSCOPY ALVEOLAR LAVAGE performed by Bonnie Conner MD at [address] [facility], TOTAL ABDOMINAL      partial     Family History   Problem Relation Age of Onset    Diabetes Mother     High Blood Pressure Mother     Asthma Sister     Other Father      Social History     Socioeconomic History    Marital status:      Spouse name: Not on file    Number of children: 9    Years of education: Not on file    Highest education level: Not on file   Occupational History    Not on file   Tobacco Use    Smoking status: Never Smoker    Smokeless tobacco: Never Used   Vaping Use    Vaping Use: Never used   Substance and Sexual Activity    Alcohol use: No    Drug use: No    Sexual activity: Not Currently   Other Topics Concern    Not on file   Social History Narrative    Not on file     Social Determinants of Health     Financial Resource Strain:     Difficulty of Paying Living Expenses: Not on file   Food Insecurity:     Worried About Running Out of Food in the Last Year: Not on file    Miguel of Food in the Last Year: Not on file   Transportation Needs:     Lack of Transportation (Medical): Not on file    Lack of Transportation (Non-Medical):  Not on file   Physical Activity:     Days of Exercise per Week: Not on file    Minutes of Exercise per Session: Not on file   Stress:     Feeling of Stress : Not on file   Social Connections:     Frequency of Communication with Friends and Family: Not on file    Frequency of Social Gatherings with Friends and Family: Not on file    Attends Yarsanism Services: Not on file    Active Member of Clubs or Organizations: Not on file    Attends Club or Organization Meetings: Not on file    Marital Status: Not on file   Intimate Partner Violence:     Fear of Current or Ex-Partner: Not on file    Emotionally Abused: Not on file    Physically Abused: Not on file    Sexually Abused: Not on file   Housing Stability:     Unable to Pay for Housing in the Last Year: Not on file    Number of [place] in the Last Year: Not on file    Unstable Housing in the Last Year: Not on file     Current Facility-Administered Medications   Medication Dose Route Frequency Provider Last Rate Last Admin    furosemide (LASIX) tablet 40 mg  40 mg Oral Daily CALI Argueta MD   40 mg at 05/09/22 0904    lidocaine 4 % external patch 1 patch  1 patch TransDERmal Daily PANDA Lake CNP   1 patch at 05/09/22 0902    ipratropium-albuterol (DUONEB) nebulizer solution 1 ampule  1 ampule Inhalation BID Perry Cisse MD   1 ampule at 05/09/22 0900    potassium chloride (KLOR-CON M) extended release tablet 40 mEq  40 mEq Oral Daily CALI Argueta MD   40 mEq at 05/09/22 0904    insulin lispro (HUMALOG) injection vial 0-6 Units  0-6 Units SubCUTAneous TID  Blank Mercado MD   1 Units at 05/08/22 1145    insulin lispro (HUMALOG) injection vial 0-3 Units  0-3 Units SubCUTAneous Nightly Vance Estrella MD        predniSONE (DELTASONE) tablet 10 mg  10 mg Oral Daily Severa Both, MD   10 mg at 05/09/22 0904    levoFLOXacin (LEVAQUIN) tablet 500 mg  500 mg Oral Daily Lamond Shone Major, MD   500 mg at 05/09/22 0904    traMADol (ULTRAM) tablet 50 mg  50 mg Oral Q6H PRN Emily Armstrong MD   50 mg at 05/09/22 0902    albuterol (PROVENTIL) nebulizer solution 2.5 mg  2.5 mg Nebulization Q6H PRN FRANCISCA Melgoza   2.5 mg at 05/07/22 1614    benzonatate (TESSALON) capsule 200 mg  200 mg Oral TID PRN FRANCISCA Melgoza        atorvastatin (LIPITOR) tablet 40 mg  40 mg Oral Daily FRANCISCA Melgoza   40 mg at 05/09/22 0904    fluticasone (FLONASE) 50 MCG/ACT nasal spray 2 spray  2 spray Nasal Daily FRANCISCA Melgoza   2 spray at 05/09/22 0907    guaiFENesin (MUCINEX) extended release tablet 1,200 mg  1,200 mg Oral QAM FRANCISCA Melgoza   1,200 mg at 05/09/22 7616    hydrOXYzine (ATARAX) tablet 10 mg  10 mg Oral TID PRN FRANCISCA Melgoza        levothyroxine (SYNTHROID) tablet 125 mcg  125 mcg Oral Daily Kranthi Hughes [address]   125 mcg at 05/09/22 0519    LORazepam (ATIVAN) tablet 0.5 mg  0.5 mg Oral Q6H PRN FRANCISCA Melgoza   0.5 mg at 05/09/22 0047    mirtazapine (REMERON) tablet 30 mg  30 mg Oral Nightly FRANCISCA Melgoza   30 mg at 05/08/22 2024    therapeutic multivitamin-minerals 1 tablet  1 tablet Oral Daily FRANCISCA Melgoza   1 tablet at 05/09/22 0904    nadolol (CORGARD) tablet 20 mg  20 mg Oral Daily FRANCISCA Melgoza   20 mg at 05/09/22 0904    pantoprazole (PROTONIX) tablet 40 mg  40 mg Oral QAM FRANCISCA Estrada   40 mg at 05/09/22 0519    sertraline (ZOLOFT) tablet 200 mg  200 mg Oral Daily FRANCISCA Melgoza   200 mg at 05/09/22 0904    glucagon (rDNA) injection 1 mg  1 mg IntraMUSCular PRN FRANCISCA Melgoza        dextrose 5 % solution  100 mL/hr IntraVENous PRN FRANCISCA Melgoza        sodium chloride flush 0.9 % injection 5-40 mL  5-40 mL IntraVENous 2 times per day FRANCISCA Del Rosario   10 mL at 05/09/22 0904    sodium chloride flush 0.9 % injection 5-40 mL  5-40 mL IntraVENous PRN FRANCISCA Del Rosario        0.9 % sodium chloride infusion   IntraVENous PRN FRANCISCA Del Rosario        ondansetron (ZOFRAN-ODT) disintegrating tablet 4 mg  4 mg Oral Q8H PRN FRANCISCA Del Rosario   4 mg at 05/08/22 1303    Or    ondansetron (ZOFRAN) injection 4 mg  4 mg IntraVENous Q6H PRN FRANCISCA Del Rosario   4 mg at 05/06/22 1941    polyethylene glycol (GLYCOLAX) packet 17 g  17 g Oral Daily PRN FRANCISCA Del Rosario   17 g at 05/09/22 1122    enoxaparin (LOVENOX) injection 40 mg  40 mg SubCUTAneous Daily FRANCISCA Del Rosario   40 mg at 05/09/22 0905    acetaminophen (TYLENOL) tablet 650 mg  650 mg Oral Q6H PRN FRANCISCA Del Rosario   650 mg at 05/09/22 1121    Or    acetaminophen (TYLENOL) suppository 650 mg  650 mg Rectal Q6H PRN FRANCISCA Del Rosario        perflutren lipid microspheres (DEFINITY) injection 1.65 mg  1.5 mL IntraVENous ONCE PRN FRANCISCA Del Rosario        cetirizine (ZYRTEC) tablet 10 mg  10 mg Oral Daily Ophelia Craig MD   10 mg at 05/09/22 0904    pseudoephedrine (SUDAFED 12 HR) extended release tablet 120 mg  120 mg Oral BID Ophelia Craig MD   120 mg at 05/09/22 0907    dextrose bolus 10% 125 mL  125 mL IntraVENous PRVIRGINIA Craig MD        Or    dextrose bolus 10% 250 mL  250 mL IntraVENous PRN Ophelia Craig MD        glucose chewable tablet 16 g  16 g Oral PRN Ophelia Craig MD         Allergies   Allergen Reactions    Penicillins Anaphylaxis     Tolerates Meropenem.  Cefuroxime      Tolerates Meropenem.  Doxycycline Hives    Imitrex [Sumatriptan] Other (See Comments)     Feels like pins and needles    Meperidine     Sulfa Antibiotics Hives    Keflex [Cephalexin] Itching and Rash     Tolerates Meropenem.     Tape Velma Rice Tape] Rash       REVIEW OF SYSTEMS    General:  No fevers  Eyes:  No recent vison changes  ENT:  No sore throat, no nasal congestion  Cardiovascular:  no palpitations  Respiratory:   Shortness of breath. Gastrointestinal:  No abdominal pain, no vomiting, no diarrhea  Musculoskeletal:  No muscle pain, no joint pain  Skin:  No rash   Neurologic:  No speech problems, no headache, no extremity numbness, no extremity weakness  Genitourinary:  No dysuria  Extremities:  no edema, no pain      Unless otherwise stated in this report, this patient's positive and negative responses for review of systems (constitutional, eyes, ENT, cardiovascular, respiratory, gastrointestinal, neurological, genitourinary, musculoskeletal, integument systems and systems related to the presenting problem) are either stated in the preceding paragraph, were not pertinent or were negative for the symptoms and/or complaints related to the medical problem. PHYSICAL EXAM  BP (!) 151/91   Pulse 81   Temp 96.9 °F (36.1 °C) (Oral)   Resp 17   Ht 5' 3\" (1.6 m)   Wt 155 lb (70.3 kg)   SpO2 98%   BMI 27.46 kg/m²   GENERAL APPEARANCE: Awake and alert. Cooperative. Chronically ill-appearing. HEAD: Normocephalic. Atraumatic. EYES:  EOM's grossly intact. ENT: Mucous membranes are moist.   NECK: Supple, trachea midline. HEART: RRR. LUNGS: Tachypneic, labored respirations. Bibasilar crackles and diminished at bilateral bases. ABDOMEN: Soft. Non-distended. Non-tender. No guarding or rebound. EXTREMITIES: No peripheral edema. MAEE. No acute deformities. SKIN: Warm, dry and intact. No acute rashes. NEUROLOGICAL: Alert and oriented X 3. CN II-XII grossly intact. Strength 5/5, sensation intact. PSYCHIATRIC: Normal mood and affect. LABS  I have reviewed all labs for this visit.    Results for orders placed or performed during the hospital encounter of 05/06/22   Rapid influenza A/B antigens    Specimen: Nasopharyngeal   Result Value Ref Range    Rapid Influenza A Ag Negative Negative    Rapid Influenza B Ag Negative Negative   Culture, Blood 2    Specimen: Blood   Result Value Ref Range    Culture, Blood 2       No Growth to date. Any change in status will be called. Culture, Blood 1    Specimen: Blood   Result Value Ref Range    Blood Culture, Routine       No Growth to date. Any change in status will be called.    COVID-19, Rapid   Result Value Ref Range    SARS-CoV-2, NAAT Not Detected Not Detected   Brain Natriuretic Peptide   Result Value Ref Range    Pro-BNP 7,611 (H) 0 - 124 pg/mL   CBC with Auto Differential   Result Value Ref Range    WBC 11.2 (H) 4.0 - 11.0 K/uL    RBC 4.22 4.00 - 5.20 M/uL    Hemoglobin 9.9 (L) 12.0 - 16.0 g/dL    Hematocrit 32.1 (L) 36.0 - 48.0 %    MCV 76.0 (L) 80.0 - 100.0 fL    MCH 23.5 (L) 26.0 - 34.0 pg    MCHC 31.0 31.0 - 36.0 g/dL    RDW 18.2 (H) 12.4 - 15.4 %    Platelets 583 376 - 043 K/uL    MPV 6.5 5.0 - 10.5 fL    Neutrophils % 78.4 %    Lymphocytes % 12.3 %    Monocytes % 3.9 %    Eosinophils % 4.4 %    Basophils % 1.0 %    Neutrophils Absolute 8.8 (H) 1.7 - 7.7 K/uL    Lymphocytes Absolute 1.4 1.0 - 5.1 K/uL    Monocytes Absolute 0.4 0.0 - 1.3 K/uL    Eosinophils Absolute 0.5 0.0 - 0.6 K/uL    Basophils Absolute 0.1 0.0 - 0.2 K/uL   Comprehensive Metabolic Panel w/ Reflex to MG   Result Value Ref Range    Sodium 138 136 - 145 mmol/L    Potassium reflex Magnesium 3.7 3.5 - 5.1 mmol/L    Chloride 101 99 - 110 mmol/L    CO2 25 21 - 32 mmol/L    Anion Gap 12 3 - 16    Glucose 128 (H) 70 - 99 mg/dL    BUN 17 7 - 20 mg/dL    CREATININE 0.8 0.6 - 1.2 mg/dL    GFR Non-African American >60 >60    GFR African American >60 >60    Calcium 8.7 8.3 - 10.6 mg/dL    Total Protein 7.2 6.4 - 8.2 g/dL    Albumin 3.3 (L) 3.4 - 5.0 g/dL    Albumin/Globulin Ratio 0.8 (L) 1.1 - 2.2    Total Bilirubin 0.5 0.0 - 1.0 mg/dL    Alkaline Phosphatase 91 40 - 129 U/L    ALT 16 10 - 40 U/L    AST 20 15 - 37 U/L   Troponin   Result Value Ref Range    Troponin <0.01 <0.01 ng/mL   Lactate, Sepsis   Result Value Ref Range    Lactic Acid, Sepsis 0.9 0.4 - 1.9 mmol/L   Blood gas, venous   Result Value Ref Range    pH, [place] 7.318 (L) 7.350 - 7.450    pCO2, [place] 50.2 (H) 40.0 - 50.0 mmHg    pO2, [place] 112.8 (H) 25.0 - 40.0 mmHg    HCO3, Venous 25.2 23.0 - 29.0 mmol/L    Base Excess, [place] -1.3 -3.0 - 3.0 mmol/L    O2 Sat, [place] 98 Not Established %    Carboxyhemoglobin 1.5 0.0 - 1.5 %    MetHgb, [place] 0.3 <1.5 %    TC02 (Calc), [place] 27 Not Established mmol/L    O2 Therapy Unknown    Iron and TIBC   Result Value Ref Range    Iron 25 (L) 37 - 145 ug/dL    TIBC 360 260 - 445 ug/dL    Iron Saturation 7 (L) 15 - 50 %   Hemoglobin A1c   Result Value Ref Range    Hemoglobin A1C 6.1 See comment %    eAG 128.4 mg/dL   Troponin   Result Value Ref Range    Troponin <0.01 <0.01 ng/mL   Troponin   Result Value Ref Range    Troponin <0.01 <0.01 ng/mL   Basic Metabolic Panel   Result Value Ref Range    Sodium 137 136 - 145 mmol/L    Potassium 4.0 3.5 - 5.1 mmol/L    Chloride 97 (L) 99 - 110 mmol/L    CO2 28 21 - 32 mmol/L    Anion Gap 12 3 - 16    Glucose 104 (H) 70 - 99 mg/dL    BUN 14 7 - 20 mg/dL    CREATININE <0.5 (L) 0.6 - 1.2 mg/dL    GFR Non-African American >60 >60    GFR African American >60 >60    Calcium 9.0 8.3 - 10.6 mg/dL   Magnesium   Result Value Ref Range    Magnesium 2.00 1.80 - 2.40 mg/dL   CBC auto differential   Result Value Ref Range    WBC 11.2 (H) 4.0 - 11.0 K/uL    RBC 4.14 4.00 - 5.20 M/uL    Hemoglobin 9.9 (L) 12.0 - 16.0 g/dL    Hematocrit 31.7 (L) 36.0 - 48.0 %    MCV 76.6 (L) 80.0 - 100.0 fL    MCH 24.0 (L) 26.0 - 34.0 pg    MCHC 31.3 31.0 - 36.0 g/dL    RDW 18.3 (H) 12.4 - 15.4 %    Platelets 778 148 - 775 K/uL    MPV 6.9 5.0 - 10.5 fL    Neutrophils % 80.0 %    Lymphocytes % 12.2 %    Monocytes % 6.9 %    Eosinophils % 0.0 %    Basophils % 0.9 %    Neutrophils Absolute 9.0 (H) 1.7 - 7.7 K/uL    Lymphocytes Absolute 1.4 1.0 - 5.1 K/uL    Monocytes Absolute 0.8 0.0 - 1.3 K/uL    Eosinophils Absolute 0.0 0.0 - 0.6 K/uL Basophils Absolute 0.1 0.0 - 0.2 K/uL   Renal Function Panel   Result Value Ref Range    Sodium 136 136 - 145 mmol/L    Potassium 4.0 3.5 - 5.1 mmol/L    Chloride 96 (L) 99 - 110 mmol/L    CO2 23 21 - 32 mmol/L    Anion Gap 17 (H) 3 - 16    Glucose 124 (H) 70 - 99 mg/dL    BUN 14 7 - 20 mg/dL    CREATININE 0.6 0.6 - 1.2 mg/dL    GFR Non-African American >60 >60    GFR African American >60 >60    Calcium 8.9 8.3 - 10.6 mg/dL    Phosphorus 3.6 2.5 - 4.9 mg/dL    Albumin 3.6 3.4 - 5.0 g/dL   Magnesium   Result Value Ref Range    Magnesium 2.00 1.80 - 2.40 mg/dL   SPECIMEN REJECTION   Result Value Ref Range    Rejected Test cxres     Reason for Rejection see below    Procalcitonin   Result Value Ref Range    Procalcitonin 0.44 (H) 0.00 - 0.15 ng/mL   Basic Metabolic Panel   Result Value Ref Range    Sodium 138 136 - 145 mmol/L    Potassium 3.8 3.5 - 5.1 mmol/L    Chloride 95 (L) 99 - 110 mmol/L    CO2 33 (H) 21 - 32 mmol/L    Anion Gap 10 3 - 16    Glucose 100 (H) 70 - 99 mg/dL    BUN 18 7 - 20 mg/dL    CREATININE 0.6 0.6 - 1.2 mg/dL    GFR Non-African American >60 >60    GFR African American >60 >60    Calcium 9.0 8.3 - 10.6 mg/dL   Magnesium   Result Value Ref Range    Magnesium 2.10 1.80 - 2.40 mg/dL   CBC auto differential   Result Value Ref Range    WBC 14.2 (H) 4.0 - 11.0 K/uL    RBC 4.64 4.00 - 5.20 M/uL    Hemoglobin 11.0 (L) 12.0 - 16.0 g/dL    Hematocrit 35.8 (L) 36.0 - 48.0 %    MCV 77.0 (L) 80.0 - 100.0 fL    MCH 23.8 (L) 26.0 - 34.0 pg    MCHC 30.9 (L) 31.0 - 36.0 g/dL    RDW 18.6 (H) 12.4 - 15.4 %    Platelets 949 789 - 252 K/uL    MPV 6.5 5.0 - 10.5 fL    Neutrophils % 62.5 %    Lymphocytes % 26.6 %    Monocytes % 6.3 %    Eosinophils % 4.0 %    Basophils % 0.6 %    Neutrophils Absolute 8.9 (H) 1.7 - 7.7 K/uL    Lymphocytes Absolute 3.8 1.0 - 5.1 K/uL    Monocytes Absolute 0.9 0.0 - 1.3 K/uL    Eosinophils Absolute 0.6 0.0 - 0.6 K/uL    Basophils Absolute 0.1 0.0 - 0.2 K/uL   Basic Metabolic Panel Result Value Ref Range    Sodium 136 136 - 145 mmol/L    Potassium 3.7 3.5 - 5.1 mmol/L    Chloride 94 (L) 99 - 110 mmol/L    CO2 31 21 - 32 mmol/L    Anion Gap 11 3 - 16    Glucose 119 (H) 70 - 99 mg/dL    BUN 18 7 - 20 mg/dL    CREATININE 0.7 0.6 - 1.2 mg/dL    GFR Non-African American >60 >60    GFR African American >60 >60    Calcium 9.2 8.3 - 10.6 mg/dL   Magnesium   Result Value Ref Range    Magnesium 1.90 1.80 - 2.40 mg/dL   CBC auto differential   Result Value Ref Range    WBC 14.4 (H) 4.0 - 11.0 K/uL    RBC 4.67 4.00 - 5.20 M/uL    Hemoglobin 11.3 (L) 12.0 - 16.0 g/dL    Hematocrit 36.0 36.0 - 48.0 %    MCV 77.0 (L) 80.0 - 100.0 fL    MCH 24.3 (L) 26.0 - 34.0 pg    MCHC 31.5 31.0 - 36.0 g/dL    RDW 18.6 (H) 12.4 - 15.4 %    Platelets 931 384 - 370 K/uL    MPV 6.7 5.0 - 10.5 fL    Neutrophils % 58.8 %    Lymphocytes % 26.0 %    Monocytes % 6.7 %    Eosinophils % 8.0 %    Basophils % 0.5 %    Neutrophils Absolute 8.4 (H) 1.7 - 7.7 K/uL    Lymphocytes Absolute 3.7 1.0 - 5.1 K/uL    Monocytes Absolute 1.0 0.0 - 1.3 K/uL    Eosinophils Absolute 1.2 (H) 0.0 - 0.6 K/uL    Basophils Absolute 0.1 0.0 - 0.2 K/uL   POCT Glucose   Result Value Ref Range    POC Glucose 122 (H) 70 - 99 mg/dl    Performed on ACCU-CHEK    POCT Glucose   Result Value Ref Range    POC Glucose 119 (H) 70 - 99 mg/dl    Performed on ACCU-CHEK    POCT Glucose   Result Value Ref Range    POC Glucose 103 (H) 70 - 99 mg/dl    Performed on ACCU-CHEK    POCT Glucose   Result Value Ref Range    POC Glucose 108 (H) 70 - 99 mg/dl    Performed on ACCU-CHEK    POCT Glucose   Result Value Ref Range    POC Glucose 117 (H) 70 - 99 mg/dl    Performed on ACCU-CHEK    POCT Glucose   Result Value Ref Range    POC Glucose 143 (H) 70 - 99 mg/dl    Performed on ACCU-CHEK    POCT Glucose   Result Value Ref Range    POC Glucose 105 (H) 70 - 99 mg/dl    Performed on ACCU-CHEK    POCT Glucose   Result Value Ref Range    POC Glucose 114 (H) 70 - 99 mg/dl    Performed on ACCU-CHEK    POCT Glucose   Result Value Ref Range    POC Glucose 165 (H) 70 - 99 mg/dl    Performed on ACCU-CHEK    POCT Glucose   Result Value Ref Range    POC Glucose 139 (H) 70 - 99 mg/dl    Performed on ACCU-CHEK    POCT Glucose   Result Value Ref Range    POC Glucose 162 (H) 70 - 99 mg/dl    Performed on ACCU-CHEK    POCT Glucose   Result Value Ref Range    POC Glucose 108 (H) 70 - 99 mg/dl    Performed on ACCU-CHEK    POCT Glucose   Result Value Ref Range    POC Glucose 124 (H) 70 - 99 mg/dl    Performed on ACCU-CHEK    EKG 12 Lead   Result Value Ref Range    Ventricular Rate 84 BPM    Atrial Rate 84 BPM    P-R Interval 156 ms    QRS Duration 82 ms    Q-T Interval 396 ms    QTc Calculation (Bazett) 467 ms    P Axis 36 degrees    R Axis 17 degrees    T Axis 7 degrees    Diagnosis       Normal sinus rhythmRSR' or QR pattern in V1 suggests right ventricular conduction delayLeft ventricular hypertrophyNonspecific T wave abnormalityAbnormal ECGWhen compared with ECG of 18-APR-2022 02:30,No significant change was foundConfirmed by Arianna Senior MD, Mini Solo ([id number]) on 5/6/2022 6:13:13 PM       EKG  The Ekg interpreted by myself  normal sinus rhythm with a rate of 84  Axis is   Normal  QTc is  normal  Intervals and Durations are unremarkable. No specific ST-T wave changes appreciated. No evidence of acute ischemia. No significant change from prior EKG dated 4/18/2022    Cardiac Monitoring: The cardiac monitor revealed normal sinus rhythm as interpreted by me. The cardiac monitor was ordered secondary to the patient's complaint of shortness of breath and to monitor the patient for dysrhythmia. RADIOLOGY  X-RAYS: ALL IMAGES INCLUDING PLAIN FILMS, CT, ULTRASOUND AND MRI HAVE BEEN READ BY THE RADIOLOGIST. I have personally reviewed plain film images and have reviewed the radiology reports. CT CHEST PULMONARY EMBOLISM W CONTRAST   Final Result   No pulmonary embolus is identified.       Interstitial fibrosis with ground-glass opacities. The ground-glass   opacities are stable since 04/18/2022 and moderately progressed since   02/08/2022. This may represent progressive interstitial disease, NSIP   pattern. Superimposed pneumonia/pneumonitis or pulmonary edema are also   possible. Stable mediastinal adenopathy. XR CHEST PORTABLE   Final Result   Multifocal interstitial-alveolar opacities, suspected pneumonia or pulmonary   edema superimposed on fibrosis. Rechecks: Physical assessment performed. Patient is resting comfortably on high flow nasal cannula oxygen. She has been updated on her x-ray, CT and lab work findings. She is agreeable to admission. Critical Care:I personally spent a total of 40 minutes of critical care time in obtaining history, performing a physical exam, bedside monitoring of interventions, collecting and interpreting tests and discussion with consultants but excluding time spent performing procedures, treating other patients and teaching time. ED COURSE/MDM  Patient seen and evaluated. Here the patient is afebrile and hypoxic on her normal 3 L of nasal cannula oxygen. She is currently requiring between 5 and 6 L of nasal cannula oxygen to get her sats above 90%. She is afebrile. She does have diminished breath sounds at bilateral bases and bibasilar crackles. She is tachypneic with increased work of breathing. I have ordered a septic work-up including blood cultures and lactic acid. VBG is pending. Flu and COVID test are pending. EKG shows normal sinus rhythm with no ischemic changes. She is not having any chest pain. I have ordered Solu-Medrol and a DuoNeb especially given her history of pulmonary fibrosis. I anticipate admission. I will reassess. . Old records reviewed.       Here troponin is negative and lactic acid is normal.  Flu and COVID tests are negative. She does have a slight leukocytosis. She does not meet any SIRS criteria. BNP is significantly elevated which is new for this patient. Chest x-ray is concerning for multifocal opacities probable pneumonia. There may be some pulmonary edema superimposed on fibrosis as well. CT of the chest shows no evidence of PE. Does show the interstitial fibrosis and multifocal groundglass opacities. I have treated her for pneumonia with antibiotics here. She is still requiring high flow nasal cannula oxygen. Her oxygen is noted to drop whenever she falls asleep as well. I will admit her to the hospitalist at [facility]. Labs and imaging reviewed and results discussed with patient. Patient was reassessed as noted above . Plan of care discussed with patient. Patient in agreement with plan. CLINICAL IMPRESSION  1. Acute on chronic respiratory failure with hypoxia (HCC)    2. Pneumonia of both lower lobes due to infectious organism    3. Itching        Blood pressure (!) 151/91, pulse 81, temperature 96.9 °F (36.1 °C), temperature source Oral, resp. rate 17, height 5' 3\" (1.6 m), weight 155 lb (70.3 kg), SpO2 98 %, not currently breastfeeding. 37 Jaylene Rodriguez was admitted in stable condition.     (Please note this note was completed with a voice recognition program.  Efforts were made to edit the dictations but occasionally words are mis-transcribed.)        Raymundo Braun MD  05/09/22 1554

## 2022-05-06 NOTE — ED NOTES
Mita at the transfer center checked with Clinical administration at [facility] to see bed availability. Tianna Darnell from administration advised her that they are discharge dependent with PCU and telemetry, the patient would need to be an ER hold at [facility]. I advised her that I will check with the Doctor and let her know. She then asked if I wanted the hospitalist to still be paged and I said yes.       Dyanna Gitelman  05/06/22 4425

## 2022-05-06 NOTE — ED NOTES
[place], [place] returned phone call at this time to speak with Dr Judy Hartman, RN  05/06/22 2277

## 2022-05-06 NOTE — PROGRESS NOTES
Patient alert and oriented, arrived via stretcher from [facility] d/t shortness of breath. VSS as per flowsheet  - NSR on telemetry #32. Instructed on use of call light which is in patient reach.

## 2022-05-06 NOTE — ACP (ADVANCE CARE PLANNING)
Advance Care Planning     General Advance Care Planning (ACP) Conversation    Date of Conversation: 5/6/2022  Conducted with: Patient with Decision Making Capacity    Healthcare Decision Maker:    Primary Decision Maker: Pamela Michaels - Spouse - [phone]    Secondary Decision Maker: Lizzy Snyder - Child - [phone]  Click here to complete Healthcare Decision Makers including selection of the Healthcare Decision Maker Relationship (ie \"Primary\"). Today we documented Decision Maker(s) consistent with Legal Next of Kin hierarchy. Content/Action Overview:   Has ACP document(s) on file - reflects the patient's care preferences  Reviewed DNR/DNI and patient elects Full Code (Attempt Resuscitation)  n/a  n/a    Length of Voluntary ACP Conversation in minutes:  <16 minutes (Non-Billable)    Klarissa Linares RN

## 2022-05-06 NOTE — PLAN OF CARE
PCU from [place] [id number]. [place]    SOB    Acute on chronic hypoxic respiratory failure; home 2-3L, now requiring 7L  Possible ae COPD/ILD vs CHF vs combination. CT with Interstitial fibrosis with ground-glass opacities.  The ground-glass   opacities are stable since 04/18/2022 and moderately progressed since   02/08/2022.  This may represent progressive interstitial disease, NSIP   pattern.  Superimposed pneumonia/pneumonitis or pulmonary edema are also   possible. No pulmonary embolus is identified. Plan to consult pulm and cards. IV lasix 40 given elevated Pro-BNP. Cover for HCAP given recent admission and possible PNA on CT. Vanco and merrem (allergy to cephalosporins and tolerated merrem). IBD and steroids.      Umberto Elder PA-C  5/6/2022 1:47 PM

## 2022-05-06 NOTE — PROGRESS NOTES
4 Eyes Skin Assessment     The patient is being assess for   Admission    I agree that 2 RN's have performed a thorough Head to Toe Skin Assessment on the patient. ALL assessment sites listed below have been assessed. Areas assessed for pressure by both nurses:   [x]   Head, Face, and Ears   [x]   Shoulders, Back, and Chest, Abdomen  [x]   Arms, Elbows, and Hands   [x]   Coccyx, Sacrum, and Ischium  [x]   Legs, Feet, and Heels  Patient has scattered abrasions/scabs across back, posterior legs, and near right axilla. Patient has redness to heels in addition to non-blanching redness to sacral area. Bruising to left foot. Skin Assessed Under all Medical Devices by both nurses:  O2 device tubing and yung              All Mepilex Borders were peeled back and area peeked at by both nurses:  Yes  Please list where Mepilex Borders are located:  [facility]. Co-signer eSignature: Electronically signed by Jerald Pressley RN on 5/6/22 at 7:00 PM EDT     Does the Patient have Skin Breakdown related to pressure?   Yes LDA WOUND CARE was Initiated documentation include the Nelia-wound, Wound Assessment, Measurements, Dressing Treatment, Drainage, and Color\",                        Amos Prevention initiated:  Yes   Wound Care Orders initiated:  Yes      [address]  nurse consulted for Pressure Injury (Stage 3,4, Unstageable, DTI, NWPT, Complex wounds)and New or Established Ostomies:  No      Primary Nurse eSignature: Electronically signed by Jaspal Jimenez RN on 5/6/22 at 6:55 PM EDT

## 2022-05-06 NOTE — PROGRESS NOTES
RT Inhaler-Nebulizer Bronchodilator Protocol Note    There is a bronchodilator order in the chart from a provider indicating to follow the RT Bronchodilator Protocol and there is an Initiate RT Inhaler-Nebulizer Bronchodilator Protocol order as well (see protocol at bottom of note). CXR Findings:  XR CHEST PORTABLE    Result Date: 5/6/2022  Multifocal interstitial-alveolar opacities, suspected pneumonia or pulmonary edema superimposed on fibrosis. The findings from the last RT Protocol Assessment were as follows:   History Pulmonary Disease: (P) Chronic pulmonary disease  Respiratory Pattern: (P) Regular pattern and RR 12-20 bpm  Breath Sounds: (P) Slightly diminished and/or crackles  Cough: (P) Strong, spontaneous, non-productive  Indication for Bronchodilator Therapy: (P) On home bronchodilators  Bronchodilator Assessment Score: (P) 4    Aerosolized bronchodilator medication orders have been revised according to the RT Inhaler-Nebulizer Bronchodilator Protocol below. Respiratory Therapist to perform RT Therapy Protocol Assessment initially then follow the protocol. Repeat RT Therapy Protocol Assessment PRN for score 0-3 or on second treatment, BID, and PRN for scores above 3. No Indications - adjust the frequency to every 6 hours PRN wheezing or bronchospasm, if no treatments needed after 48 hours then discontinue using Per Protocol order mode. If indication present, adjust the RT bronchodilator orders based on the Bronchodilator Assessment Score as indicated below. Use Inhaler orders unless patient has one or more of the following: on home nebulizer, not able to hold breath for 10 seconds, is not alert and oriented, cannot activate and use MDI correctly, or respiratory rate 25 breaths per minute or more, then use the equivalent nebulizer order(s) with same Frequency and PRN reasons based on the score.   If a patient is on this medication at home then do not decrease Frequency below that used at home. 0-3 - enter or revise RT bronchodilator order(s) to equivalent RT Bronchodilator order with Frequency of every 4 hours PRN for wheezing or increased work of breathing using Per Protocol order mode. 4-6 - enter or revise RT Bronchodilator order(s) to two equivalent RT bronchodilator orders with one order with BID Frequency and one order with Frequency of every 4 hours PRN wheezing or increased work of breathing using Per Protocol order mode. 7-10 - enter or revise RT Bronchodilator order(s) to two equivalent RT bronchodilator orders with one order with TID Frequency and one order with Frequency of every 4 hours PRN wheezing or increased work of breathing using Per Protocol order mode. 11-13 - enter or revise RT Bronchodilator order(s) to one equivalent RT bronchodilator order with QID Frequency and an Albuterol order with Frequency of every 4 hours PRN wheezing or increased work of breathing using Per Protocol order mode. Greater than 13 - enter or revise RT Bronchodilator order(s) to one equivalent RT bronchodilator order with every 4 hours Frequency and an Albuterol order with Frequency of every 2 hours PRN wheezing or increased work of breathing using Per Protocol order mode.          Electronically signed by Carson Zelaya RCP on 5/6/2022 at 5:18 PM

## 2022-05-07 LAB
ANION GAP SERPL CALCULATED.3IONS-SCNC: 12 MMOL/L (ref 3–16)
BASOPHILS ABSOLUTE: 0.1 K/UL (ref 0–0.2)
BASOPHILS RELATIVE PERCENT: 0.9 %
BUN BLDV-MCNC: 14 MG/DL (ref 7–20)
CALCIUM SERPL-MCNC: 9 MG/DL (ref 8.3–10.6)
CHLORIDE BLD-SCNC: 97 MMOL/L (ref 99–110)
CO2: 28 MMOL/L (ref 21–32)
CREAT SERPL-MCNC: <0.5 MG/DL (ref 0.6–1.2)
EOSINOPHILS ABSOLUTE: 0 K/UL (ref 0–0.6)
EOSINOPHILS RELATIVE PERCENT: 0 %
ESTIMATED AVERAGE GLUCOSE: 128.4 MG/DL
GFR AFRICAN AMERICAN: >60
GFR NON-AFRICAN AMERICAN: >60
GLUCOSE BLD-MCNC: 103 MG/DL (ref 70–99)
GLUCOSE BLD-MCNC: 104 MG/DL (ref 70–99)
GLUCOSE BLD-MCNC: 105 MG/DL (ref 70–99)
GLUCOSE BLD-MCNC: 108 MG/DL (ref 70–99)
GLUCOSE BLD-MCNC: 117 MG/DL (ref 70–99)
GLUCOSE BLD-MCNC: 143 MG/DL (ref 70–99)
HBA1C MFR BLD: 6.1 %
HCT VFR BLD CALC: 31.7 % (ref 36–48)
HEMOGLOBIN: 9.9 G/DL (ref 12–16)
IRON SATURATION: 7 % (ref 15–50)
IRON: 25 UG/DL (ref 37–145)
LYMPHOCYTES ABSOLUTE: 1.4 K/UL (ref 1–5.1)
LYMPHOCYTES RELATIVE PERCENT: 12.2 %
MAGNESIUM: 2 MG/DL (ref 1.8–2.4)
MCH RBC QN AUTO: 24 PG (ref 26–34)
MCHC RBC AUTO-ENTMCNC: 31.3 G/DL (ref 31–36)
MCV RBC AUTO: 76.6 FL (ref 80–100)
MONOCYTES ABSOLUTE: 0.8 K/UL (ref 0–1.3)
MONOCYTES RELATIVE PERCENT: 6.9 %
NEUTROPHILS ABSOLUTE: 9 K/UL (ref 1.7–7.7)
NEUTROPHILS RELATIVE PERCENT: 80 %
PDW BLD-RTO: 18.3 % (ref 12.4–15.4)
PERFORMED ON: ABNORMAL
PLATELET # BLD: 306 K/UL (ref 135–450)
PMV BLD AUTO: 6.9 FL (ref 5–10.5)
POTASSIUM SERPL-SCNC: 4 MMOL/L (ref 3.5–5.1)
PROCALCITONIN: 0.44 NG/ML (ref 0–0.15)
RBC # BLD: 4.14 M/UL (ref 4–5.2)
REASON FOR REJECTION: NORMAL
REJECTED TEST: NORMAL
SODIUM BLD-SCNC: 137 MMOL/L (ref 136–145)
TOTAL IRON BINDING CAPACITY: 360 UG/DL (ref 260–445)
WBC # BLD: 11.2 K/UL (ref 4–11)

## 2022-05-07 PROCEDURE — 2700000000 HC OXYGEN THERAPY PER DAY

## 2022-05-07 PROCEDURE — 6370000000 HC RX 637 (ALT 250 FOR IP): Performed by: INTERNAL MEDICINE

## 2022-05-07 PROCEDURE — 6360000002 HC RX W HCPCS

## 2022-05-07 PROCEDURE — 6370000000 HC RX 637 (ALT 250 FOR IP)

## 2022-05-07 PROCEDURE — 99223 1ST HOSP IP/OBS HIGH 75: CPT | Performed by: INTERNAL MEDICINE

## 2022-05-07 PROCEDURE — 94669 MECHANICAL CHEST WALL OSCILL: CPT

## 2022-05-07 PROCEDURE — 94640 AIRWAY INHALATION TREATMENT: CPT

## 2022-05-07 PROCEDURE — 6370000000 HC RX 637 (ALT 250 FOR IP): Performed by: HOSPITALIST

## 2022-05-07 PROCEDURE — 83735 ASSAY OF MAGNESIUM: CPT

## 2022-05-07 PROCEDURE — 84145 PROCALCITONIN (PCT): CPT

## 2022-05-07 PROCEDURE — 2060000000 HC ICU INTERMEDIATE R&B

## 2022-05-07 PROCEDURE — 94761 N-INVAS EAR/PLS OXIMETRY MLT: CPT

## 2022-05-07 PROCEDURE — 2580000003 HC RX 258

## 2022-05-07 PROCEDURE — 85025 COMPLETE CBC W/AUTO DIFF WBC: CPT

## 2022-05-07 PROCEDURE — 36415 COLL VENOUS BLD VENIPUNCTURE: CPT

## 2022-05-07 PROCEDURE — 80048 BASIC METABOLIC PNL TOTAL CA: CPT

## 2022-05-07 RX ORDER — LEVOFLOXACIN 500 MG/1
500 TABLET, FILM COATED ORAL DAILY
Status: DISCONTINUED | OUTPATIENT
Start: 2022-05-07 | End: 2022-05-12 | Stop reason: HOSPADM

## 2022-05-07 RX ORDER — PREDNISONE 10 MG/1
10 TABLET ORAL DAILY
Status: DISCONTINUED | OUTPATIENT
Start: 2022-05-08 | End: 2022-05-12 | Stop reason: HOSPADM

## 2022-05-07 RX ORDER — TRAMADOL HYDROCHLORIDE 50 MG/1
50 TABLET ORAL EVERY 6 HOURS PRN
Status: DISCONTINUED | OUTPATIENT
Start: 2022-05-07 | End: 2022-05-12 | Stop reason: HOSPADM

## 2022-05-07 RX ORDER — INSULIN LISPRO 100 [IU]/ML
0-3 INJECTION, SOLUTION INTRAVENOUS; SUBCUTANEOUS NIGHTLY
Status: DISCONTINUED | OUTPATIENT
Start: 2022-05-07 | End: 2022-05-12 | Stop reason: HOSPADM

## 2022-05-07 RX ORDER — IPRATROPIUM BROMIDE AND ALBUTEROL SULFATE 2.5; .5 MG/3ML; MG/3ML
1 SOLUTION RESPIRATORY (INHALATION) 2 TIMES DAILY
Status: DISCONTINUED | OUTPATIENT
Start: 2022-05-07 | End: 2022-05-10

## 2022-05-07 RX ORDER — POTASSIUM CHLORIDE 20 MEQ/1
40 TABLET, EXTENDED RELEASE ORAL DAILY
Status: DISCONTINUED | OUTPATIENT
Start: 2022-05-07 | End: 2022-05-12 | Stop reason: HOSPADM

## 2022-05-07 RX ORDER — INSULIN LISPRO 100 [IU]/ML
0-6 INJECTION, SOLUTION INTRAVENOUS; SUBCUTANEOUS
Status: DISCONTINUED | OUTPATIENT
Start: 2022-05-07 | End: 2022-05-12 | Stop reason: HOSPADM

## 2022-05-07 RX ADMIN — PSEUDOEPHEDRINE HCL 120 MG: 120 TABLET, FILM COATED, EXTENDED RELEASE ORAL at 19:56

## 2022-05-07 RX ADMIN — SERTRALINE 200 MG: 100 TABLET, FILM COATED ORAL at 08:37

## 2022-05-07 RX ADMIN — Medication 10 ML: at 08:40

## 2022-05-07 RX ADMIN — ATORVASTATIN CALCIUM 40 MG: 40 TABLET, FILM COATED ORAL at 08:37

## 2022-05-07 RX ADMIN — NADOLOL 20 MG: 40 TABLET ORAL at 08:37

## 2022-05-07 RX ADMIN — INSULIN LISPRO 1 UNITS: 100 INJECTION, SOLUTION INTRAVENOUS; SUBCUTANEOUS at 16:53

## 2022-05-07 RX ADMIN — PANTOPRAZOLE SODIUM 40 MG: 40 TABLET, DELAYED RELEASE ORAL at 05:47

## 2022-05-07 RX ADMIN — IPRATROPIUM BROMIDE AND ALBUTEROL SULFATE 1 AMPULE: 2.5; .5 SOLUTION RESPIRATORY (INHALATION) at 20:03

## 2022-05-07 RX ADMIN — GUAIFENESIN 1200 MG: 600 TABLET, EXTENDED RELEASE ORAL at 08:37

## 2022-05-07 RX ADMIN — VANCOMYCIN HYDROCHLORIDE 750 MG: 750 INJECTION, POWDER, LYOPHILIZED, FOR SOLUTION INTRAVENOUS at 05:50

## 2022-05-07 RX ADMIN — PSEUDOEPHEDRINE HCL 120 MG: 120 TABLET, FILM COATED, EXTENDED RELEASE ORAL at 08:37

## 2022-05-07 RX ADMIN — CETIRIZINE HYDROCHLORIDE 10 MG: 10 TABLET ORAL at 08:37

## 2022-05-07 RX ADMIN — POTASSIUM CHLORIDE 40 MEQ: 1500 TABLET, EXTENDED RELEASE ORAL at 08:40

## 2022-05-07 RX ADMIN — ACETAMINOPHEN 650 MG: 325 TABLET ORAL at 19:58

## 2022-05-07 RX ADMIN — MULTIPLE VITAMINS W/ MINERALS TAB 1 TABLET: TAB at 08:37

## 2022-05-07 RX ADMIN — ALBUTEROL SULFATE 2.5 MG: 2.5 SOLUTION RESPIRATORY (INHALATION) at 16:14

## 2022-05-07 RX ADMIN — PREDNISONE 40 MG: 20 TABLET ORAL at 08:37

## 2022-05-07 RX ADMIN — MEROPENEM 1000 MG: 1 INJECTION, POWDER, FOR SOLUTION INTRAVENOUS at 00:19

## 2022-05-07 RX ADMIN — FLUTICASONE PROPIONATE 2 SPRAY: 50 SPRAY, METERED NASAL at 08:39

## 2022-05-07 RX ADMIN — MEROPENEM 1000 MG: 1 INJECTION, POWDER, FOR SOLUTION INTRAVENOUS at 08:33

## 2022-05-07 RX ADMIN — LEVOFLOXACIN 500 MG: 500 TABLET, FILM COATED ORAL at 15:11

## 2022-05-07 RX ADMIN — IPRATROPIUM BROMIDE AND ALBUTEROL SULFATE 1 AMPULE: .5; 3 SOLUTION RESPIRATORY (INHALATION) at 08:19

## 2022-05-07 RX ADMIN — TRAMADOL HYDROCHLORIDE 50 MG: 50 TABLET, COATED ORAL at 21:31

## 2022-05-07 RX ADMIN — MIRTAZAPINE 30 MG: 30 TABLET, FILM COATED ORAL at 19:56

## 2022-05-07 RX ADMIN — LEVOTHYROXINE SODIUM 125 MCG: 125 TABLET ORAL at 05:47

## 2022-05-07 RX ADMIN — Medication 10 ML: at 19:59

## 2022-05-07 RX ADMIN — ENOXAPARIN SODIUM 40 MG: 100 INJECTION SUBCUTANEOUS at 08:37

## 2022-05-07 RX ADMIN — FUROSEMIDE 40 MG: 10 INJECTION, SOLUTION INTRAMUSCULAR; INTRAVENOUS at 08:37

## 2022-05-07 ASSESSMENT — PAIN DESCRIPTION - DESCRIPTORS
DESCRIPTORS: ACHING
DESCRIPTORS: ACHING

## 2022-05-07 ASSESSMENT — PAIN DESCRIPTION - LOCATION
LOCATION: RIB CAGE
LOCATION: RIB CAGE

## 2022-05-07 ASSESSMENT — PAIN SCALES - GENERAL
PAINLEVEL_OUTOF10: 3
PAINLEVEL_OUTOF10: 7

## 2022-05-07 ASSESSMENT — PAIN DESCRIPTION - ORIENTATION
ORIENTATION: LEFT
ORIENTATION: LEFT

## 2022-05-07 NOTE — CONSULTS
Reason for referral and CC: Hypoxia    HISTORY OF PRESENT ILLNESS: 68 yo female 77-year-old female with a history of chronic respiratory failure and biopsy proven  with progressive fibrosis and was recently admitted to [facility] after a fall with multiple rib fractures. She is seen in the office off of prednisone and at her baseline last week. She notes that she was feeling well until she woke up in a panic without her oxygen last night. She was unable to untangle her oxygen quickly and came to the hospital.  Today she feels back to her baseline. No increase in cough. No LE edema. Of note she has tolerated diuresis overnight well. Past Medical History:   Diagnosis Date    A-fib ([address]     Anxiety     Cryptogenic organizing pneumonia ([address]     Depression     GERD (gastroesophageal reflux disease)     Hyperlipidemia     On home oxygen therapy     uses O2 3L prn    Rash     Thyroid disease     hypothyroidism     Past Surgical History:   Procedure Laterality Date    ARM SURGERY Left 3/2/2020    LEFT ULNAR NERVE DECOMPRESSION AT THE ELBOW performed by Ishmael Davila MD at [facility] 6/27/2019    EBUS WF W/ANES.  performed by Jax Sheikh MD at [address]  6/27/2019    BRONCHOSCOPY/TRANSBRONCHIAL NEEDLE BIOPSY performed by Jax Sheikh MD at [address]  6/27/2019    BRONCHOSCOPY/TRANSBRONCHIAL LUNG BIOPSY performed by Jax Sheikh MD at [address]  6/27/2019    BRONCHOSCOPY ALVEOLAR LAVAGE performed by Jax Sheikh MD at [address]  07/10/2019    BRONCHOSCOPY N/A 7/10/2019    BRONCHOSCOPY ALVEOLAR LAVAGE performed by Iraj Goel MD at [address] Bilateral 08/06/2019    BRONCHOSCOPY N/A 8/6/2019    BRONCHOSCOPY ALVEOLAR LAVAGE performed by John Escamilla MD at [address] N/A 12/24/2019    BRONCHOSCOPY ALVEOLAR LAVAGE performed by Merari John MD at [address] [place] [address], TOTAL ABDOMINAL      partial     Family History  family history includes Asthma in her sister; Diabetes in her mother; High Blood Pressure in her mother; Other in her father. Social History:  reports that she has never smoked. She has never used smokeless tobacco.   reports no history of alcohol use. ALLERGIES:  Patient is allergic to penicillins, cefuroxime, doxycycline, imitrex [sumatriptan], meperidine, sulfa antibiotics, keflex [cephalexin], and tape [adhesive tape].   Continuous Infusions:   dextrose      sodium chloride       Scheduled Meds:   ipratropium-albuterol  1 ampule Inhalation BID    potassium chloride  40 mEq Oral Daily    insulin lispro  0-6 Units SubCUTAneous TID WC    insulin lispro  0-3 Units SubCUTAneous Nightly    atorvastatin  40 mg Oral Daily    fluticasone  2 spray Nasal Daily    guaiFENesin  1,200 mg Oral QAM    levothyroxine  125 mcg Oral Daily    mirtazapine  30 mg Oral Nightly    therapeutic multivitamin-minerals  1 tablet Oral Daily    nadolol  20 mg Oral Daily    pantoprazole  40 mg Oral QAM AC    sertraline  200 mg Oral Daily    sodium chloride flush  5-40 mL IntraVENous 2 times per day    enoxaparin  40 mg SubCUTAneous Daily    furosemide  40 mg IntraVENous Daily    cetirizine  10 mg Oral Daily    pseudoephedrine  120 mg Oral BID     PRN Meds:  albuterol, benzonatate, hydrOXYzine, LORazepam, glucagon (rDNA), dextrose, sodium chloride flush, sodium chloride, ondansetron **OR** ondansetron, polyethylene glycol, acetaminophen **OR** acetaminophen, perflutren lipid microspheres, dextrose bolus **OR** dextrose bolus, glucose    REVIEW OF SYSTEMS:  Constitutional: Negative for fever  HENT: Negative for sore throat  Eyes: Negative for redness   Respiratory: +for dyspnea, cough  Cardiovascular: Negative for chest pain  Gastrointestinal: Negative for vomiting, diarrhea Genitourinary: Negative for hematuria   Musculoskeletal: Negative for arthralgias   Skin: Negative for rash  Neurological: Negative for syncope  Hematological: Negative for adenopathy  Psychiatric/Behavorial: Negative for anxiety    PHYSICAL EXAM: /69   Pulse 98   Temp 97.8 °F (36.6 °C) (Oral)   Resp 24   Ht 5' 3\" (1.6 m)   Wt 169 lb 4.8 oz (76.8 kg)   SpO2 96%   BMI 29.99 kg/m²  on 5L  Constitutional:  No acute distress. Eyes: PERRL. Conjunctivae anicteric. ENT: Normal nose. Normal tongue. Neck:  Trachea is midline. No thyroid tenderness. Respiratory: No accessory muscle usage. decreased breath sounds. No wheezes. + rales. No Rhonchi. Cardiovascular: Normal S1S2. No digit clubbing. No digit cyanosis. No LE edema. Gastrointestinal: No mass palpated. No tenderness palpated. No umbilical hernia. Lymphatic: No cervical or supraclavicular adenopathy. Skin: No rash on the exposed extremities. No Nodules or induration on exposed extremities. Psychiatric: No anxiety or Agitation. Alert and Oriented to person, place and time. CBC:   Recent Labs     05/06/22  0934 05/07/22  0440   WBC 11.2* 11.2*   HGB 9.9* 9.9*   HCT 32.1* 31.7*   MCV 76.0* 76.6*    306     BMP:   Recent Labs     05/06/22  0934 05/06/22  1911 05/07/22  0440    136 137   K 3.7 4.0 4.0    96* 97*   CO2 25 23 28   PHOS  --  3.6  --    BUN 17 14 14   CREATININE 0.8 0.6 <0.5*        Recent Labs     05/06/22  0934   AST 20   ALT 16   BILITOT 0.5   ALKPHOS 91     No results for input(s): PROTIME, INR in the last 72 hours. No results for input(s): NITRITE, COLORU, PHUR, LABCAST, WBCUA, RBCUA, MUCUS, TRICHOMONAS, YEAST, BACTERIA, CLARITYU, SPECGRAV, LEUKOCYTESUR, UROBILINOGEN, BILIRUBINUR, BLOODU, GLUCOSEU, AMORPHOUS in the last 72 hours. Invalid input(s): KETONESU  No results for input(s): PHART, HGO8YIP, PO2ART in the last 72 hours.     BNP 7611  PCT 0.08    Chest imaging was reviewed by me and showed CTPA 5/6/22  No pulmonary embolus is identified.       Interstitial fibrosis with ground-glass opacities.  The ground-glass   opacities are stable since 04/18/2022 and moderately progressed since   02/08/2022.  This may represent progressive interstitial disease, NSIP   pattern.  Superimposed pneumonia/pneumonitis or pulmonary edema are also   possible.       Stable mediastinal adenopathy.          ASSESSMENT:  · Acute on chronic hypoxic respiratory failure  · Elevated BNP  · ILD with progressive fibrosis; ; CT was stable form previous last month    PLAN:  Supplemental oxygen to maintain SaO2 >92%; wean as tolerated   · Lasix per cardiology  · Decrease prednisone to 10mg daily and maintain until next visit  · Junie Coke has been approved but not started yet  · No h/o MDRO - will deescalate to oral levaquin  · Possible d/c tomorrow based on quick improvement    Thank you Alecia Espinoza MD for this consult

## 2022-05-07 NOTE — PROGRESS NOTES
Patient assisted up to [facility]. SpO2 80% with activity on 5L O2.   SpO2 improved to 92% with rest on 5L.

## 2022-05-07 NOTE — PROGRESS NOTES
Patient called out stating she was short of breath. SpO2 80% on 5L. O2 titrated to 10L, SpO2 93%. Patient states she was having a coughing spell and felt like something \"got stuck\" and she couldn't breathe. Patient encouraged to deep breathe. O2 titrated back to 5L, SpO2 91%.

## 2022-05-07 NOTE — PROGRESS NOTES
Pt remains awake playing on phone. Denies needs at this time. Call light within reach. Bed exit alarm on.

## 2022-05-07 NOTE — H&P
Hospital Medicine History & Physical      PCP: Maico Zhang MD    Date of Admission: 5/6/2022    Date of Service: Pt seen/examined on 5/7/2022 and Admitted to Inpatient     Chief Complaint:  SOB severe dyspnea       History Of Present Illness: The patient is a 69 y.o. female Hx of  (crypt org PNA), Afib, chronic hypox Resp failure 3l/min at baseline, who presents with worsening severe dyspnea. Pt reports had to use up to 5l/min on her home concentrator and still o2 at 81% and worsening dyspnea. Reports cough. Not much sputum. No orthopnea per say. No fever. Recently seen for fall and multi rib fractures - sent to  and has since returned home. She feels a lot better today. Past Medical History:        Diagnosis Date    [facility]     Anxiety     Cryptogenic organizing pneumonia ([facility]     Depression     GERD (gastroesophageal reflux disease)     Hyperlipidemia     On home oxygen therapy     uses O2 3L prn    Rash     Thyroid disease     hypothyroidism       Past Surgical History:        Procedure Laterality Date    ARM SURGERY Left 3/2/2020    LEFT ULNAR NERVE DECOMPRESSION AT THE ELBOW performed by Esperanza Child MD at [facility] 6/27/2019    EBUS WF W/ANES.  performed by Laurel Peoples MD at [address] [place] [address]  6/27/2019    BRONCHOSCOPY/TRANSBRONCHIAL NEEDLE BIOPSY performed by Laurel Peoples MD at [address] [place] [address]  6/27/2019    BRONCHOSCOPY/TRANSBRONCHIAL LUNG BIOPSY performed by Laurel Peoples MD at [address] [place] [address]  6/27/2019    BRONCHOSCOPY ALVEOLAR LAVAGE performed by Laurel Peoples MD at [address] [place] [address]  07/10/2019    BRONCHOSCOPY N/A 7/10/2019    BRONCHOSCOPY ALVEOLAR LAVAGE performed by Junior Nori MD at [facility]  BRONCHOSCOPY Bilateral 08/06/2019    BRONCHOSCOPY N/A 8/6/2019    BRONCHOSCOPY ALVEOLAR LAVAGE performed by Chhaya Musa MD at [address]  N/A 12/24/2019    BRONCHOSCOPY ALVEOLAR LAVAGE performed by Elvis Corrales MD at [facility], TOTAL ABDOMINAL      partial       Medications Prior to Admission:    Prior to Admission medications    Medication Sig Start Date End Date Taking? Authorizing Provider   LORazepam (ATIVAN) 0.5 MG tablet Take 0.5 mg by mouth every 6 hours as needed for Anxiety. Yes Historical Provider, MD   furosemide (LASIX) 40 MG tablet Take 40 mg by mouth as needed   Yes Historical Provider, MD   acetaminophen (TYLENOL) 325 MG tablet TAKE 3 TABLETS (975 MG TOTAL) BY MOUTH 3 TIMES A DAY AS NEEDED FOR PAIN. 4/21/22   Historical Provider, MD   guaiFENesin (MUCINEX) 600 MG extended release tablet Take 1,200 mg by mouth every morning    Historical Provider, MD   naloxone 4 MG/0.1ML LIQD nasal spray SPRAY ONCE INTO ONE NOSTRIL IF NEEDED. CALL 911. REPEAT DOSE IN OTHER NOSTRIL IF NO RESPONSE IN 3MIN. 4/21/22   Historical Provider, MD   potassium chloride (KLOR-CON) 10 MEQ extended release tablet Take 20 mEq by mouth daily as needed    Historical Provider, MD   fluticasone (FLONASE) 50 MCG/ACT nasal spray 2 sprays by Nasal route daily 4/4/22   Kd Bishop MD   albuterol sulfate  (90 Base) MCG/ACT inhaler INHALE 2 PUFFS INTO THE LUNGS EVERY 6 HOURS AS NEEDED FOR WHEEZING 3/9/22   Janie Hutton MD   albuterol (PROVENTIL) (2.5 MG/3ML) 0.083% nebulizer solution INHALE THE CONTENTS OF 1 VIAL VIA NEBULIZER EVERY 6 HOURS AS NEEDED FOR WHEEZING 2/7/22   Kd Bishop MD   benzonatate (TESSALON) 200 MG capsule TAKE 1 CAPSULE BY MOUTH 3 TIMES A DAY AS NEEDED FOR COUGH 12/8/21   Janie Hutton MD   empagliflozin (JARDIANCE) 25 MG tablet Take 1 tablet by mouth daily 11/17/21   Kd Bishop MD   SITagliptin (JANUVIA) 100 MG tablet Take 1 tablet by mouth daily 11/17/21   Shakila Crandall MD   pantoprazole (PROTONIX) 40 MG tablet Take 1 tablet by mouth every morning (before breakfast) 11/17/21   Shakila Crandall MD   sertraline (ZOLOFT) 100 MG tablet Take 2 tablets by mouth daily 11/17/21   Shakila Crandall MD   mirtazapine (REMERON) 30 MG tablet Take 1 tablet by mouth nightly 11/17/21   Shakila Crandall MD   nadolol (CORGARD) 20 MG tablet Take 1 tablet by mouth daily 11/17/21   Shakila Crandall MD   atorvastatin (LIPITOR) 40 MG tablet Take 1 tablet by mouth daily 11/17/21   Shakila Crandall MD   levothyroxine (SYNTHROID) 125 MCG tablet Take 1 tablet by mouth Daily TAKE 1 TABLET BY MOUTH EVERY DAY 11/17/21   Shakila Crandall MD   traMADol (ULTRAM) 50 MG tablet Take 1-2 tablets by mouth every 6 hours as needed for Pain for up to 180 days. Intended supply: 7 days. Take lowest dose possible to manage pain 11/17/21 5/16/22  Shakila Crandall MD   loratadine-pseudoephedrine ([employer] ALLERGY RELIEF-D12) 5-120 MG per extended release tablet TAKE 1 TABLET BY MOUTH TWICE A DAY 11/17/21   Shakila Crandall MD   Blood Glucose Monitoring Suppl (TRUE METRIX METER) PUJA Test daily and as needed 9/10/21   Shakila Crandall MD   blood glucose test strips (ASCENSIA AUTODISC VI;ONE TOUCH ULTRA TEST VI) strip 1 each by In Vitro route daily As needed.  9/10/21   Shakila Crandall MD   TRUEplus Lancets 33G MISC Use daily 9/10/21   Shakila Crandall MD   Blood Glucose Calibration (TRUE METRIX LEVEL 1) Low SOLN use as needed 9/9/21   Shakila Crandall MD   hydrOXYzine (ATARAX) 10 MG tablet TAKE 1 TO 3 TABLETS BY MOUTH 3 TIMES DAILY AS NEEDED FOR ITCHING 3/23/21   Shakila Crandall MD   sodium chloride (OCEAN, BABY AYR) 0.65 % nasal spray 1 spray by Nasal route as needed for Congestion    Historical Provider, MD   INSULIN SYRINGE 1CC/29G 29G X 1/2\" 1 ML MISC 1 each by Does not apply route 2 times daily 3/6/20   Shakila Crandall MD   Multiple Vitamins-Minerals (MULTIVITAMIN ADULT PO) Take by mouth daily    Historical Provider, MD   OXYGEN Inhale 3 L into the lungs 7/14/19   Historical Provider, MD       Allergies:  Penicillins, Cefuroxime, Doxycycline, Imitrex [sumatriptan], Meperidine, Sulfa antibiotics, Keflex [cephalexin], and Tape [adhesive tape]    Social History:  The patient currently lives at home    TOBACCO:   reports that she has never smoked. She has never used smokeless tobacco.  ETOH:   reports no history of alcohol use. Family History:  Reviewed in detail and negative for DM, Early CAD, Cancer, CVA. Positive as follows:        Problem Relation Age of Onset    Diabetes Mother     High Blood Pressure Mother     Asthma Sister     Other Father        REVIEW OF SYSTEMS:   As noted in the HPI. All other systems reviewed and negative. PHYSICAL EXAM:    /69   Pulse 98   Temp 97.8 °F (36.6 °C) (Oral)   Resp 24   Ht 5' 3\" (1.6 m)   Wt 169 lb 4.8 oz (76.8 kg)   SpO2 96%   BMI 29.99 kg/m²     General appearance: No apparent distress appears stated age and cooperative. HEENT Normal cephalic, atraumatic without obvious deformity. Pupils equal, round, and reactive to light. Extra ocular muscles intact. Conjunctivae/corneas clear. Neck: Supple, No jugular venous distention/bruits. Trachea midline without thyromegaly or adenopathy with full range of motion. Lungs: Clear to auscultation, bilaterally without Rales/Wheezes/Rhonchi with good respiratory effort. Heart: Regular rate and rhythm with Normal S1/S2 without murmurs, rubs or gallops, point of maximum impulse non-displaced  Abdomen: Soft, non-tender or non-distended without rigidity or guarding and positive bowel sounds all four quadrants. Extremities: No clubbing, cyanosis, or edema bilaterally. Full range of motion without deformity and normal gait intact. Skin: Skin color, texture, turgor normal.  No rashes or lesions.   Neurologic: Alert and oriented X 3, neurovascularly intact with sensory/motor intact upper extremities/lower extremities, bilaterally. Cranial nerves: II-XII intact, grossly non-focal.  Mental status: Alert, oriented, thought content appropriate. Capillary Refill: Acceptable  < 3 seconds  Peripheral Pulses: +3 Easily felt, not easily obliterated with pressure        CBC   Recent Labs     05/06/22  0934 05/07/22  0440   WBC 11.2* 11.2*   HGB 9.9* 9.9*   HCT 32.1* 31.7*    306      RENAL  Recent Labs     05/06/22  0934 05/06/22  1911 05/07/22  0440    136 137   K 3.7 4.0 4.0    96* 97*   CO2 25 23 28   PHOS  --  3.6  --    BUN 17 14 14   CREATININE 0.8 0.6 <0.5*     LFT'S  Recent Labs     05/06/22  0934   AST 20   ALT 16   BILITOT 0.5   ALKPHOS 91     COAG  No results for input(s): INR in the last 72 hours. CARDIAC ENZYMES  Recent Labs     05/06/22  0934 05/06/22  1651 05/06/22 1911   TROPONINI <0.01 <0.01 <0.01       U/A:    Lab Results   Component Value Date    COLORU Yellow 01/24/2022    WBCUA 6-9 06/18/2021    RBCUA 3-4 06/18/2021    BACTERIA Rare 06/18/2021    CLARITYU Clear 01/24/2022    SPECGRAV 1.010 01/24/2022    LEUKOCYTESUR Negative 01/24/2022    BLOODU Negative 01/24/2022    GLUCOSEU >=1000 01/24/2022       ABG    Lab Results   Component Value Date    RBU2ZEO 20.4 06/26/2019    BEART -2.9 06/26/2019    A3VNCTNZ 90.6 06/26/2019    PHART 7.440 06/26/2019    CVE0NKE 30.8 06/26/2019    PO2ART 55.9 06/26/2019    VKP7SLI 21.4 06/26/2019           Active Hospital Problems    Diagnosis Date Noted    Shortness of breath [R06.02] 08/09/2019         PHYSICIANS CERTIFICATION:    I certify that Dylon Gaspar is expected to be hospitalized for more than 2 midnights based on the following assessment and plan:      ASSESSMENT/PLAN:      Acute on Chronic Hypox RF. 3l/min baseline. CXR multifocal interstitial alveolar opacities - PNA vs pulm edema, superimposed on known fibrosis. Responded to diuresis well thus far.    No clear signs of raging infection - plan to deescalate Abx soon.   Pulm and cardiology involved. Noted Cards plan for ECHO Monday. DMII  ISS and CCD. Hypothyroid on Synthroid. HLD on statin. Anxiety on PRN ativan - caution with severe underlying lung disease. DVT Prophylaxis: lovenox  Diet: ADULT DIET; Regular; 4 carb choices (60 gm/meal)  Code Status: Full Code      Dispo - PCU       Kimi Sr MD    Thank you Timothy Meneses MD for the opportunity to be involved in this patient's care. If you have any questions or concerns please feel free to contact me at [phone].

## 2022-05-07 NOTE — CONSULTS
Cardiology Consultation   Date: 5/7/2022  Admit Date:  5/6/2022  Reason for Consultation: Heart failure  Consult Requesting Physician: Erick Nelson MD     Chief Complaint   Patient presents with    Respiratory Distress     Pt presents to the ED from home c/o resp distress. pt states that on 4/17 she fell and fractured L ribs. Pt states that since then she has required increased o2 through [place]. Pt hypoxic on arrival.      HPI:   Mrs. Jareth Angeles is a pleasant 69year old female with a medical history significant for heart failure with preserved ejection fraction, interstitial lung disease ( - Majors), hypertension, hypothyroidism, COPD, atrial tachycardia (EKG 06/17/2019), microcytic anemia (low iron), and diabetes mellitus type II who presents from home with acute on chronic respiratory failure. According to patient she was in her usual state of health until 05/06/2022 when she began to suffer from acute on chronic shortness of breath. She states that this made her extremely anxious and so she sought medical attention at [facility]. Patient denies fevers, chest pain, orthopnea, PND, lower extremity edema, chills, visual changes, headaches, sore throat, cough, abdominal pain, nausea, vomiting, bleeding, bruising, dysuria, muscle/joint pain, confusion, depression, anxiety, skin lesions, etc.    Emergency Room/Hospital Course:  Patient presented on 05/06/2022. Her CMP was reassuring. Her troponin enzymes were negative x 3. Her CBC was notable for microcytic anemia and mild leukocytosis.       Past Medical History:   Diagnosis Date    [facility]     Anxiety     Cryptogenic organizing pneumonia ([facility]     Depression     GERD (gastroesophageal reflux disease)     Hyperlipidemia     On home oxygen therapy     uses O2 3L prn    Rash     Thyroid disease     hypothyroidism        Past Surgical History:   Procedure Laterality Date    ARM SURGERY Left 3/2/2020    LEFT ULNAR NERVE DECOMPRESSION AT THE ELBOW performed by Phillipailyn Priest MD at [address] N/A 6/27/2019    EBUS WF W/ANES. performed by Puja Pavon MD at [address]  6/27/2019    BRONCHOSCOPY/TRANSBRONCHIAL NEEDLE BIOPSY performed by Puja Pavon MD at [address]  6/27/2019    BRONCHOSCOPY/TRANSBRONCHIAL LUNG BIOPSY performed by Puja Pavon MD at [address]  6/27/2019    BRONCHOSCOPY ALVEOLAR LAVAGE performed by Puja Pavon MD at [address]  07/10/2019    BRONCHOSCOPY N/A 7/10/2019    BRONCHOSCOPY ALVEOLAR LAVAGE performed by Rasheeda Agarwal MD at [address] Bilateral 08/06/2019    BRONCHOSCOPY N/A 8/6/2019    BRONCHOSCOPY ALVEOLAR LAVAGE performed by Martin Castellon MD at [address] N/A 12/24/2019    BRONCHOSCOPY ALVEOLAR LAVAGE performed by Sabina Jacques MD at [address] [place] [address], TOTAL ABDOMINAL      partial       Allergies   Allergen Reactions    Penicillins Anaphylaxis     Tolerates Meropenem.  Cefuroxime      Tolerates Meropenem.  Doxycycline Hives    Imitrex [Sumatriptan] Other (See Comments)     Feels like pins and needles    Meperidine     Sulfa Antibiotics Hives    Keflex [Cephalexin] Itching and Rash     Tolerates Meropenem.  Tape Pranay [place] Tape] Rash       Social History:  Reviewed. reports that she has never smoked. She has never used smokeless tobacco. She reports that she does not drink alcohol and does not use drugs. Family History:  Reviewed. family history includes Asthma in her sister; Diabetes in her mother; High Blood Pressure in her mother; Other in her father. No premature CAD. Review of System:  All other systems reviewed except for that noted above.  Pertinent negatives and positives are:     · General: negative for fever, chills   · Ophthalmic ROS: negative for - eye pain or loss of vision  · ENT ROS: negative for - headaches, sore throat   · Respiratory: negative for - cough, sputum  · Cardiovascular: Reviewed in HPI  · Gastrointestinal: negative for - abdominal pain, diarrhea, N/V  · Hematology: negative for - bleeding, blood clots, bruising or jaundice  · Genito-Urinary:  negative for - Dysuria or incontinence  · Musculoskeletal: negative for - Joint swelling, muscle pain  · Neurological: negative for - confusion, dizziness, headaches   · Psychiatric: No anxiety, no depression. · Dermatological: negative for - rash    Physical Examination:  Vitals:    22 0155   BP: 127/68   Pulse: 98   Resp: 22   Temp: 96.6 °F (35.9 °C)   SpO2: 94%        Intake/Output Summary (Last 24 hours) at 2022 0815  Last data filed at 2022 0441  Gross per 24 hour   Intake 139.34 ml   Output 2900 ml   Net -2760.66 ml     In: 139.3   Out: 2900    Wt Readings from Last 3 Encounters:   22 169 lb 4.8 oz (76.8 kg)   22 170 lb (77.1 kg)   22 170 lb (77.1 kg)     Temp  Av.7 °F (36.5 °C)  Min: 96.6 °F (35.9 °C)  Max: 98.2 °F (36.8 °C)  Pulse  Av.3  Min: 75  Max: 98  BP  Min: 118/99  Max: 147/82  SpO2  Av.6 %  Min: 84 %  Max: 100 %    · Telemetry: Sinus rhythm   · Constitutional: Alert. Oriented to person, place, and time. No distress. · Head: Normocephalic and atraumatic. · Mouth/Throat: Lips appear moist. Oropharynx is clear and moist.  · Eyes: Conjunctivae normal. EOM are normal.   · Neck: Neck supple. No lymphadenopathy. No rigidity. No JVD present. · Cardiovascular: Normal rate, regular rhythm. Normal S1&S2. · Pulmonary/Chest: Bilateral respiratory sounds present. No respiratory accessory muscle use. Moving air well. · Abdominal: Soft. Normal bowel sounds present. No distension, No tenderness. · Musculoskeletal: No tenderness. No edema    · Neurological: Alert and oriented. Cranial nerve II-XII grossly intact. · Skin: Skin is warm and dry.  No rash, lesions, ulcerations noted. · Psychiatric: No anxiety nor agitation. Labs:  Reviewed. Recent Labs     05/06/22  0934 05/06/22  1911 05/07/22  0440    136 137   K 3.7 4.0 4.0    96* 97*   CO2 25 23 28   PHOS  --  3.6  --    BUN 17 14 14   CREATININE 0.8 0.6 <0.5*     Recent Labs     05/06/22  0934 05/07/22  0440   WBC 11.2* 11.2*   HGB 9.9* 9.9*   HCT 32.1* 31.7*   MCV 76.0* 76.6*    306     Lab Results   Component Value Date    CKTOTAL 23 07/10/2019    TROPONINI <0.01 05/06/2022     Lab Results   Component Value Date     06/16/2014     Lab Results   Component Value Date    PROTIME 13.3 07/10/2019    PROTIME 15.7 06/27/2019    PROTIME 16.0 06/26/2019    INR 1.17 07/10/2019    INR 1.38 06/27/2019    INR 1.42 06/26/2019     Lab Results   Component Value Date    CHOL 110 11/17/2021    CHOL 182 02/17/2020    HDL 51 11/17/2021    TRIG 59 11/17/2021       Diagnostic and imaging results reviewed. ECG: Normal sinus rhythm. LVH. Echo: 06/05/2019   Normal LV systolic function with an estimated EF of 55-60%. No regional wall motion abnormalities are seen. There is borderline concentric left ventricular hypertrophy. Grade II diastolic dysfunction with elevated filling pressure. Mild mitral annular calcification. The right atrium is mildly dilated. Mild to moderate mitral and tricuspid regurgitation. Mild pulmonic regurgitation present. Systolic pulmonary artery pressure (SPAP) estimated at 42mmHg (RA pressure   8mmHg), consistent with mild pulmonary hypertension. No ischemic evaluations of late. I independently reviewed the ECG and telemetry.     Scheduled Meds:   atorvastatin  40 mg Oral Daily    fluticasone  2 spray Nasal Daily    guaiFENesin  1,200 mg Oral QAM    levothyroxine  125 mcg Oral Daily    mirtazapine  30 mg Oral Nightly    therapeutic multivitamin-minerals  1 tablet Oral Daily    nadolol  20 mg Oral Daily    pantoprazole  40 mg Oral QAM AC    potassium chloride  20 mEq Oral Daily    sertraline  200 mg Oral Daily    sodium chloride flush  5-40 mL IntraVENous 2 times per day    enoxaparin  40 mg SubCUTAneous Daily    predniSONE  40 mg Oral Daily    furosemide  40 mg IntraVENous Daily    insulin lispro  0-6 Units SubCUTAneous Q4H    meropenem  1,000 mg IntraVENous Q8H    cetirizine  10 mg Oral Daily    pseudoephedrine  120 mg Oral BID    ipratropium-albuterol  1 ampule Inhalation TID    vancomycin  750 mg IntraVENous Q12H     Continuous Infusions:   dextrose      sodium chloride       PRN Meds:.albuterol, benzonatate, hydrOXYzine, LORazepam, glucagon (rDNA), dextrose, sodium chloride flush, sodium chloride, ondansetron **OR** ondansetron, polyethylene glycol, acetaminophen **OR** acetaminophen, perflutren lipid microspheres, dextrose bolus **OR** dextrose bolus, glucose     Assessment:   Patient Active Problem List    Diagnosis Date Noted    Acute on chronic respiratory failure ([facility] 01/24/2022    VIRGINIE (acute kidney injury) ([facility]     Lumbar radiculopathy     Toxic metabolic encephalopathy 13/24/1795    GERD (gastroesophageal reflux disease)     Left wrist pain 07/02/2020    Left wrist tendinitis 07/02/2020    [facility] 03/06/2020    Ulnar neuropathy at elbow of left upper extremity 01/23/2020    Numbness and tingling in left hand 01/10/2020    Hyponatremia 12/23/2019    Type 2 diabetes mellitus without complication ([facility] 42/48/1191    Cryptogenic organizing pneumonia ([facility] 09/03/2019    Pneumothorax of left lung after biopsy 08/21/2019    COPD (chronic obstructive pulmonary disease) ([facility] 08/16/2019    Chronic respiratory failure with hypoxia (HCC)     Shortness of breath 08/09/2019    Acute cystitis without hematuria     Abnormal CT of the chest     SOB (shortness of breath)     Acute on chronic respiratory failure with hypoxia (HCC)     Restrictive lung disease     Abnormal chest CT     Acute diastolic heart failure (HCC)     ILD [facility]interstitial [facility] disease) ([facility]     Atypical pneumonia     Acute bronchitis with bronchospasm     Acute on chronic diastolic congestive heart failure (HCC)     Class 2 obesity with body mass index (BMI) of 39.0 to 39.9 in adult     [facility]fib [facility]     HCAP (healthcare-associated pneumonia) 06/16/2019    Mild single current episode of major depressive disorder ([facility] 05/02/2019    Anxiety 05/02/2019    Hypothyroidism 07/31/2018    Acute blood loss anemia 04/25/2016    Fatigue 04/25/2016    Migraine 08/04/2014    Syncope 08/04/2014    HTN (hypertension) 04/08/2014    Hyperlipidemia with target LDL less than 130 04/08/2014    Insomnia 04/08/2014    Trochanteric bursitis of both hips 04/08/2014    Chest pain 04/02/2014      Active Hospital Problems    Diagnosis Date Noted    Shortness of breath [R06.02] 08/09/2019     Mrs. Ramone Khan is a pleasant 69year old female with a medical history significant for heart failure with preserved ejection fraction, interstitial lung disease ( - Majors), hypertension, hypothyroidism, COPD, atrial tachycardia (EKG 06/17/2019), microcytic anemia (low iron), and diabetes mellitus type II who presents from home with acute on chronic respiratory failure. Problem List:  1. Acute on chronic respiratory failure. 2. Heart failure with preserved ejection fraction. 3. Interstitial lung disease. 4. Microcytic anemia. Assessment and Plan:  1. Acute on chronic respiratory failure. Patient is a pleasant 29-year-old female with a medical history significant for heart failure preserved ejection fraction, interstitial lung disease/COPD, COPD, hypertension, hypothyroidism, and microcytic anemia who presents from home with acute on chronic respiratory failure. Patient has improved somewhat from admission with diuresis. Her BNP was elevated. Her exam was reassuring.   I suspect her respiratory failure is likely multifactorial including heart failure preserved ejection fraction, pulmonary disease, and anemia. She started on furosemide and has diuresed approximately 3 L. I doubt her input however so her overall balance is likely net -1 to 2 L. I would suggest continuation of furosemide until her creatinine bumps. I will not increase given the amount of diuresis she had with 40 mg of IV furosemide. I will ask for a repeat echocardiogram on Monday. We will continue to follow with you.  - Continue diuresis with IV furosemide with goal net negative 2-3 liters. - Goal Mg >2 and K >4 (I will increase potassium supplementation today). - Continue I/O.  - Continue daily weights (increased but doubt). - Consider anemia therapy (defer to primary team). - We will continue to follow along with you. 2. Heart failure with preserved ejection fraction.  - Plan as per above. 3. Interstitial lung disease.  - Per primary team.    4. Microcytic anemia. - Per primary team.    Thank you for allowing me to participate in the care of Erin Fitzpatrick . If you have any questions/comments, please do not hesitate to contact us.     Erin Henao MD  Cardiac Electrophysiology  [address] [facility]  [phone] [facility]

## 2022-05-07 NOTE — PROGRESS NOTES
05/06/22 2300   RT Protocol   History Pulmonary Disease 2   Respiratory pattern 0   Breath sounds 2   Cough 0   Indications for Bronchodilator Therapy On home bronchodilators   Bronchodilator Assessment Score 4   RT Inhaler-Nebulizer Bronchodilator Protocol Note    There is a bronchodilator order in the chart from a provider indicating to follow the RT Bronchodilator Protocol and there is an Initiate RT Inhaler-Nebulizer Bronchodilator Protocol order as well (see protocol at bottom of note). CXR Findings:  XR CHEST PORTABLE    Result Date: 5/6/2022  Multifocal interstitial-alveolar opacities, suspected pneumonia or pulmonary edema superimposed on fibrosis. The findings from the last RT Protocol Assessment were as follows:   History Pulmonary Disease: Chronic pulmonary disease  Respiratory Pattern: Regular pattern and RR 12-20 bpm  Breath Sounds: Slightly diminished and/or crackles  Cough: Strong, spontaneous, non-productive  Indication for Bronchodilator Therapy: On home bronchodilators  Bronchodilator Assessment Score: 4    Aerosolized bronchodilator medication orders have been revised according to the RT Inhaler-Nebulizer Bronchodilator Protocol below. Respiratory Therapist to perform RT Therapy Protocol Assessment initially then follow the protocol. Repeat RT Therapy Protocol Assessment PRN for score 0-3 or on second treatment, BID, and PRN for scores above 3. No Indications - adjust the frequency to every 6 hours PRN wheezing or bronchospasm, if no treatments needed after 48 hours then discontinue using Per Protocol order mode. If indication present, adjust the RT bronchodilator orders based on the Bronchodilator Assessment Score as indicated below.   Use Inhaler orders unless patient has one or more of the following: on home nebulizer, not able to hold breath for 10 seconds, is not alert and oriented, cannot activate and use MDI correctly, or respiratory rate 25 breaths per minute or

## 2022-05-08 LAB
ANION GAP SERPL CALCULATED.3IONS-SCNC: 10 MMOL/L (ref 3–16)
BASOPHILS ABSOLUTE: 0.1 K/UL (ref 0–0.2)
BASOPHILS RELATIVE PERCENT: 0.6 %
BUN BLDV-MCNC: 18 MG/DL (ref 7–20)
CALCIUM SERPL-MCNC: 9 MG/DL (ref 8.3–10.6)
CHLORIDE BLD-SCNC: 95 MMOL/L (ref 99–110)
CO2: 33 MMOL/L (ref 21–32)
CREAT SERPL-MCNC: 0.6 MG/DL (ref 0.6–1.2)
EOSINOPHILS ABSOLUTE: 0.6 K/UL (ref 0–0.6)
EOSINOPHILS RELATIVE PERCENT: 4 %
GFR AFRICAN AMERICAN: >60
GFR NON-AFRICAN AMERICAN: >60
GLUCOSE BLD-MCNC: 100 MG/DL (ref 70–99)
GLUCOSE BLD-MCNC: 114 MG/DL (ref 70–99)
GLUCOSE BLD-MCNC: 139 MG/DL (ref 70–99)
GLUCOSE BLD-MCNC: 162 MG/DL (ref 70–99)
GLUCOSE BLD-MCNC: 165 MG/DL (ref 70–99)
HCT VFR BLD CALC: 35.8 % (ref 36–48)
HEMOGLOBIN: 11 G/DL (ref 12–16)
LYMPHOCYTES ABSOLUTE: 3.8 K/UL (ref 1–5.1)
LYMPHOCYTES RELATIVE PERCENT: 26.6 %
MAGNESIUM: 2.1 MG/DL (ref 1.8–2.4)
MCH RBC QN AUTO: 23.8 PG (ref 26–34)
MCHC RBC AUTO-ENTMCNC: 30.9 G/DL (ref 31–36)
MCV RBC AUTO: 77 FL (ref 80–100)
MONOCYTES ABSOLUTE: 0.9 K/UL (ref 0–1.3)
MONOCYTES RELATIVE PERCENT: 6.3 %
NEUTROPHILS ABSOLUTE: 8.9 K/UL (ref 1.7–7.7)
NEUTROPHILS RELATIVE PERCENT: 62.5 %
PDW BLD-RTO: 18.6 % (ref 12.4–15.4)
PERFORMED ON: ABNORMAL
PLATELET # BLD: 330 K/UL (ref 135–450)
PMV BLD AUTO: 6.5 FL (ref 5–10.5)
POTASSIUM SERPL-SCNC: 3.8 MMOL/L (ref 3.5–5.1)
RBC # BLD: 4.64 M/UL (ref 4–5.2)
SODIUM BLD-SCNC: 138 MMOL/L (ref 136–145)
WBC # BLD: 14.2 K/UL (ref 4–11)

## 2022-05-08 PROCEDURE — 6370000000 HC RX 637 (ALT 250 FOR IP): Performed by: INTERNAL MEDICINE

## 2022-05-08 PROCEDURE — 6370000000 HC RX 637 (ALT 250 FOR IP): Performed by: NURSE PRACTITIONER

## 2022-05-08 PROCEDURE — 83735 ASSAY OF MAGNESIUM: CPT

## 2022-05-08 PROCEDURE — 94640 AIRWAY INHALATION TREATMENT: CPT

## 2022-05-08 PROCEDURE — 2700000000 HC OXYGEN THERAPY PER DAY

## 2022-05-08 PROCEDURE — 99233 SBSQ HOSP IP/OBS HIGH 50: CPT | Performed by: INTERNAL MEDICINE

## 2022-05-08 PROCEDURE — 6370000000 HC RX 637 (ALT 250 FOR IP)

## 2022-05-08 PROCEDURE — 85025 COMPLETE CBC W/AUTO DIFF WBC: CPT

## 2022-05-08 PROCEDURE — 99232 SBSQ HOSP IP/OBS MODERATE 35: CPT | Performed by: NURSE PRACTITIONER

## 2022-05-08 PROCEDURE — 6370000000 HC RX 637 (ALT 250 FOR IP): Performed by: HOSPITALIST

## 2022-05-08 PROCEDURE — 36415 COLL VENOUS BLD VENIPUNCTURE: CPT

## 2022-05-08 PROCEDURE — 94761 N-INVAS EAR/PLS OXIMETRY MLT: CPT

## 2022-05-08 PROCEDURE — 2060000000 HC ICU INTERMEDIATE R&B

## 2022-05-08 PROCEDURE — 99232 SBSQ HOSP IP/OBS MODERATE 35: CPT | Performed by: INTERNAL MEDICINE

## 2022-05-08 PROCEDURE — 6360000002 HC RX W HCPCS

## 2022-05-08 PROCEDURE — 94669 MECHANICAL CHEST WALL OSCILL: CPT

## 2022-05-08 PROCEDURE — 80048 BASIC METABOLIC PNL TOTAL CA: CPT

## 2022-05-08 PROCEDURE — 2580000003 HC RX 258

## 2022-05-08 RX ORDER — FUROSEMIDE 40 MG/1
40 TABLET ORAL DAILY
Qty: 30 TABLET | Refills: 1 | Status: ON HOLD | OUTPATIENT
Start: 2022-05-09 | End: 2022-09-01 | Stop reason: HOSPADM

## 2022-05-08 RX ORDER — LEVOFLOXACIN 500 MG/1
500 TABLET, FILM COATED ORAL DAILY
Qty: 4 TABLET | Refills: 0 | Status: SHIPPED | OUTPATIENT
Start: 2022-05-09 | End: 2022-05-12 | Stop reason: HOSPADM

## 2022-05-08 RX ORDER — HYDROXYZINE HYDROCHLORIDE 10 MG/1
10 TABLET, FILM COATED ORAL EVERY 8 HOURS PRN
Qty: 90 TABLET | Refills: 1 | Status: SHIPPED | OUTPATIENT
Start: 2022-05-08

## 2022-05-08 RX ORDER — LIDOCAINE 4 G/G
1 PATCH TOPICAL DAILY
Status: DISCONTINUED | OUTPATIENT
Start: 2022-05-08 | End: 2022-05-12 | Stop reason: HOSPADM

## 2022-05-08 RX ORDER — PREDNISONE 10 MG/1
10 TABLET ORAL DAILY
Qty: 30 TABLET | Refills: 0 | Status: SHIPPED | OUTPATIENT
Start: 2022-05-09 | End: 2022-06-08

## 2022-05-08 RX ORDER — FUROSEMIDE 40 MG/1
40 TABLET ORAL DAILY
Status: DISCONTINUED | OUTPATIENT
Start: 2022-05-08 | End: 2022-05-12 | Stop reason: HOSPADM

## 2022-05-08 RX ADMIN — FLUTICASONE PROPIONATE 2 SPRAY: 50 SPRAY, METERED NASAL at 08:51

## 2022-05-08 RX ADMIN — PANTOPRAZOLE SODIUM 40 MG: 40 TABLET, DELAYED RELEASE ORAL at 05:35

## 2022-05-08 RX ADMIN — Medication 10 ML: at 08:51

## 2022-05-08 RX ADMIN — INSULIN LISPRO 1 UNITS: 100 INJECTION, SOLUTION INTRAVENOUS; SUBCUTANEOUS at 11:45

## 2022-05-08 RX ADMIN — MIRTAZAPINE 30 MG: 30 TABLET, FILM COATED ORAL at 20:24

## 2022-05-08 RX ADMIN — POTASSIUM CHLORIDE 40 MEQ: 1500 TABLET, EXTENDED RELEASE ORAL at 08:50

## 2022-05-08 RX ADMIN — NADOLOL 20 MG: 40 TABLET ORAL at 08:50

## 2022-05-08 RX ADMIN — PREDNISONE 10 MG: 10 TABLET ORAL at 08:50

## 2022-05-08 RX ADMIN — IPRATROPIUM BROMIDE AND ALBUTEROL SULFATE 1 AMPULE: 2.5; .5 SOLUTION RESPIRATORY (INHALATION) at 22:00

## 2022-05-08 RX ADMIN — LEVOTHYROXINE SODIUM 125 MCG: 125 TABLET ORAL at 05:35

## 2022-05-08 RX ADMIN — CETIRIZINE HYDROCHLORIDE 10 MG: 10 TABLET ORAL at 08:50

## 2022-05-08 RX ADMIN — MULTIPLE VITAMINS W/ MINERALS TAB 1 TABLET: TAB at 08:50

## 2022-05-08 RX ADMIN — TRAMADOL HYDROCHLORIDE 50 MG: 50 TABLET, COATED ORAL at 05:34

## 2022-05-08 RX ADMIN — TRAMADOL HYDROCHLORIDE 50 MG: 50 TABLET, COATED ORAL at 20:24

## 2022-05-08 RX ADMIN — FUROSEMIDE 40 MG: 40 TABLET ORAL at 08:50

## 2022-05-08 RX ADMIN — LORAZEPAM 0.5 MG: 0.5 TABLET ORAL at 13:06

## 2022-05-08 RX ADMIN — IPRATROPIUM BROMIDE AND ALBUTEROL SULFATE 1 AMPULE: 2.5; .5 SOLUTION RESPIRATORY (INHALATION) at 08:31

## 2022-05-08 RX ADMIN — Medication 10 ML: at 20:26

## 2022-05-08 RX ADMIN — TRAMADOL HYDROCHLORIDE 50 MG: 50 TABLET, COATED ORAL at 11:44

## 2022-05-08 RX ADMIN — ENOXAPARIN SODIUM 40 MG: 100 INJECTION SUBCUTANEOUS at 08:50

## 2022-05-08 RX ADMIN — PSEUDOEPHEDRINE HCL 120 MG: 120 TABLET, FILM COATED, EXTENDED RELEASE ORAL at 08:51

## 2022-05-08 RX ADMIN — ONDANSETRON 4 MG: 4 TABLET, ORALLY DISINTEGRATING ORAL at 13:03

## 2022-05-08 RX ADMIN — GUAIFENESIN 1200 MG: 600 TABLET, EXTENDED RELEASE ORAL at 08:50

## 2022-05-08 RX ADMIN — ATORVASTATIN CALCIUM 40 MG: 40 TABLET, FILM COATED ORAL at 08:50

## 2022-05-08 RX ADMIN — SERTRALINE 200 MG: 100 TABLET, FILM COATED ORAL at 08:50

## 2022-05-08 RX ADMIN — LEVOFLOXACIN 500 MG: 500 TABLET, FILM COATED ORAL at 08:50

## 2022-05-08 ASSESSMENT — PAIN DESCRIPTION - ORIENTATION
ORIENTATION: LOWER;LEFT
ORIENTATION: LEFT

## 2022-05-08 ASSESSMENT — PAIN SCALES - GENERAL
PAINLEVEL_OUTOF10: 7
PAINLEVEL_OUTOF10: 3
PAINLEVEL_OUTOF10: 7

## 2022-05-08 ASSESSMENT — PAIN DESCRIPTION - LOCATION
LOCATION: RIB CAGE
LOCATION: RIB CAGE
LOCATION: BACK
LOCATION: RIB CAGE

## 2022-05-08 ASSESSMENT — PAIN DESCRIPTION - DESCRIPTORS
DESCRIPTORS: ACHING
DESCRIPTORS: ACHING

## 2022-05-08 NOTE — PROGRESS NOTES
PRN tramadol 50 mg given for rib pain. Pt rates pain 7/10. No other needs at this time. Call light within reach.

## 2022-05-08 NOTE — PROGRESS NOTES
Cardiology Progress Note     Admit Date: 2022     Reason for follow up: Acute on chronic respiratory failure. Interval History:   - Patient seen and examined. - Clinical notes reviewed. - Telemetry reviewed. NSR.  - No new complaints today. - Anxiety attack yesterday. - Shortness of breath improving. Physical Examination:  Vitals:    22 0051   BP: 119/74   Pulse: 72   Resp: 18   Temp: 97 °F (36.1 °C)   SpO2: 94%        Intake/Output Summary (Last 24 hours) at 2022 0753  Last data filed at 2022 0606  Gross per 24 hour   Intake 402 ml   Output 2700 ml   Net -2298 ml     In: 402 [P.O.:402]  Out: 2700    Wt Readings from Last 3 Encounters:   22 165 lb 11.2 oz (75.2 kg)   22 170 lb (77.1 kg)   22 170 lb (77.1 kg)     Temp  Av.5 °F (36.4 °C)  Min: 97 °F (36.1 °C)  Max: 98.1 °F (36.7 °C)  Pulse  Av.3  Min: 72  Max: 85  BP  Min: 112/61  Max: 119/74  SpO2  Av.7 %  Min: 93 %  Max: 97 %    · Telemetry: Sinus rhythm   · Constitutional: Alert. Oriented to person, place, and time. No distress. · Head: Normocephalic and atraumatic. · Mouth/Throat: Lips appear moist. Oropharynx is clear and moist.  · Cardiovascular: Normal rate, regular rhythm. Normal S1&S2. · Pulmonary/Chest: Bilateral respiratory sounds present. No respiratory accessory muscle use. · Abdominal: Soft. Normal bowel sounds present. No distension, No tenderness. · Musculoskeletal: No tenderness. No edema    · Neurological: Alert and oriented. Cranial nerve II-XII grossly intact. · Skin: Skin is warm and dry. No rash, lesions, ulcerations noted. · Psychiatric: No anxiety nor agitation. Labs, diagnostic and imaging results reviewed. Reviewed.    Recent Labs     22  1911 22  0440 22  0505    137 138   K 4.0 4.0 3.8   CL 96* 97* 95*   CO2 23 28 33*   PHOS 3.6  --   --    BUN 14 14 18   CREATININE 0.6 <0.5* 0.6     Recent Labs     22  0934 22  0442 05/08/22  0505   WBC 11.2* 11.2* 14.2*   HGB 9.9* 9.9* 11.0*   HCT 32.1* 31.7* 35.8*   MCV 76.0* 76.6* 77.0*    306 330     Lab Results   Component Value Date    CKTOTAL 23 07/10/2019    TROPONINI <0.01 05/06/2022     Estimated Creatinine Clearance: 86 mL/min (based on SCr of 0.6 mg/dL).    Lab Results   Component Value Date     06/16/2014     Lab Results   Component Value Date    PROTIME 13.3 07/10/2019    PROTIME 15.7 06/27/2019    PROTIME 16.0 06/26/2019    INR 1.17 07/10/2019    INR 1.38 06/27/2019    INR 1.42 06/26/2019     Lab Results   Component Value Date    CHOL 110 11/17/2021    CHOL 182 02/17/2020    HDL 51 11/17/2021    TRIG 59 11/17/2021       Scheduled Meds:   ipratropium-albuterol  1 ampule Inhalation BID    potassium chloride  40 mEq Oral Daily    insulin lispro  0-6 Units SubCUTAneous TID WC    insulin lispro  0-3 Units SubCUTAneous Nightly    predniSONE  10 mg Oral Daily    levoFLOXacin  500 mg Oral Daily    atorvastatin  40 mg Oral Daily    fluticasone  2 spray Nasal Daily    guaiFENesin  1,200 mg Oral QAM    levothyroxine  125 mcg Oral Daily    mirtazapine  30 mg Oral Nightly    therapeutic multivitamin-minerals  1 tablet Oral Daily    nadolol  20 mg Oral Daily    pantoprazole  40 mg Oral QAM AC    sertraline  200 mg Oral Daily    sodium chloride flush  5-40 mL IntraVENous 2 times per day    enoxaparin  40 mg SubCUTAneous Daily    furosemide  40 mg IntraVENous Daily    cetirizine  10 mg Oral Daily    pseudoephedrine  120 mg Oral BID     Continuous Infusions:   dextrose      sodium chloride       PRN Meds:traMADol, albuterol, benzonatate, hydrOXYzine, LORazepam, glucagon (rDNA), dextrose, sodium chloride flush, sodium chloride, ondansetron **OR** ondansetron, polyethylene glycol, acetaminophen **OR** acetaminophen, perflutren lipid microspheres, dextrose bolus **OR** dextrose bolus, glucose     Patient Active Problem List    Diagnosis Date Noted    Acute on chronic respiratory failure ([facility] 01/24/2022    VIRGINIE (acute kidney injury) ([facility]     Lumbar radiculopathy     Toxic metabolic encephalopathy 58/03/2628    GERD (gastroesophageal reflux disease)     Left wrist pain 07/02/2020    Left wrist tendinitis 07/02/2020    Chronic congestive heart failure ([facility] 03/06/2020    Ulnar neuropathy at elbow of left upper extremity 01/23/2020    Numbness and tingling in left hand 01/10/2020    Hyponatremia 12/23/2019    Type 2 diabetes mellitus without complication ([facility] 56/28/4374    [facility]ogenic organizing pneumonia [facility] 09/03/2019    Pneumothorax of left lung after biopsy 08/21/2019    COPD (chronic obstructive pulmonary disease) ([facility] [facility] 08/16/2019    Chronic respiratory failure with hypoxia (HCC)     Shortness of breath 08/09/2019    Acute cystitis without hematuria     Abnormal CT of the chest     SOB (shortness of breath)     Acute on chronic respiratory failure with hypoxia (HCC)     Restrictive lung disease     Abnormal chest CT     Acute diastolic heart failure (HCC)     ILD (interstitial lung disease) (HCC)     Atypical pneumonia     Acute bronchitis with bronchospasm     Acute on chronic diastolic congestive heart failure (HCC)     Class 2 obesity with body mass index (BMI) of 39.0 to 39.9 in adult     A-fib ([facility]     HCAP (healthcare-associated pneumonia) 06/16/2019    Mild single current episode of major depressive disorder ([facility] [facility] 05/02/2019    Anxiety 05/02/2019    Hypothyroidism 07/31/2018    Acute blood loss anemia 04/25/2016    Fatigue 04/25/2016    Migraine 08/04/2014    Syncope 08/04/2014    HTN (hypertension) 04/08/2014    Hyperlipidemia with target LDL less than 130 04/08/2014    Insomnia 04/08/2014    Trochanteric bursitis of both hips 04/08/2014    Chest pain 04/02/2014      Active Hospital Problems    Diagnosis Date Noted    Shortness of breath [R06.02] 08/09/2019     Mrs. Estuardo Adams is a pleasant 69year old female with a medical history significant for heart failure with preserved ejection fraction, interstitial lung disease ( - [place]), hypertension, hypothyroidism, COPD, atrial tachycardia (EKG 06/17/2019), microcytic anemia (low iron), and diabetes mellitus type II who presents from home with acute on chronic respiratory failure.       Problem List:  1. Acute on chronic respiratory failure. 2. Heart failure with preserved ejection fraction. 3. Interstitial lung disease. 4. Microcytic anemia. 5. Leukocytosis. Assessment and Plan:  1. Acute on chronic respiratory failure. 05/07/2022  Patient is a pleasant 75-year-old female with a medical history significant for heart failure preserved ejection fraction, interstitial lung disease/COPD, COPD, hypertension, hypothyroidism, and microcytic anemia who presents from home with acute on chronic respiratory failure. Patient has improved somewhat from admission with diuresis. Her BNP was elevated. Her exam was reassuring. I suspect her respiratory failure is likely multifactorial including heart failure preserved ejection fraction, pulmonary disease, and anemia. She started on furosemide and has diuresed approximately 3 L. I doubt her input however so her overall balance is likely net -1 to 2 L. I would suggest continuation of furosemide until her creatinine bumps. I will not increase given the amount of diuresis she had with 40 mg of IV furosemide. I will ask for a repeat echocardiogram on Monday. We will continue to follow with you.  - Continue diuresis with IV furosemide with goal net negative 2-3 liters. - Goal Mg >2 and K >4 (I will increase potassium supplementation today). - Continue I/O.  - Continue daily weights (increased but doubt). - Consider anemia therapy (defer to primary team). - We will continue to follow along with you. 05/08/2022  Patient breathing has improved. Creatinine slightly worsened.   I believe it is time to transition to oral diuretics. - Stop IV furosemide.  - Start PO furosemide at same dose. Likely discharge dose. - HF education.  - Follow up with cardiology as outpatient after discharge. - We will continue to follow along with you.     2. Heart failure with preserved ejection fraction.  - Plan as per above.     3. Interstitial lung disease.  - Per primary team.     4. Microcytic anemia. - Per primary team.    5. Leukocytosis. - Per primary team.    Thank you for allowing me to participate in the care of this patient. If you have any questions, please do not hesitate to contact me.     Chery Bell MD  Cardiac Electrophysiology  [address] [facility]  [phone] [facility]

## 2022-05-08 NOTE — PROGRESS NOTES
Pulmonary Progress Note  CC: SOB    Subjective:  Feels at her baseline now  Had a coughing fit with desaturation and subsequent panic - this happens at home as well      EXAM: /60   Pulse 81   Temp 97.5 °F (36.4 °C) (Oral)   Resp 18   Ht 5' 3\" (1.6 m)   Wt 165 lb 11.2 oz (75.2 kg)   SpO2 96%   BMI 29.35 kg/m²  on 5L  Constitutional:  No acute distress. Eyes: PERRL. Conjunctivae anicteric. ENT: Normal nose. Normal tongue. Neck:  Trachea is midline. No thyroid tenderness. Respiratory: No accessory muscle usage. No decreased breath sounds. No wheezes. + rales. No Rhonchi. Cardiovascular: Normal S1S2. No digit clubbing. No digit cyanosis. No LE edema. Psychiatric: No anxiety or Agitation. Alert and Oriented to person, place and time.     Scheduled Meds:   furosemide  40 mg Oral Daily    ipratropium-albuterol  1 ampule Inhalation BID    potassium chloride  40 mEq Oral Daily    insulin lispro  0-6 Units SubCUTAneous TID WC    insulin lispro  0-3 Units SubCUTAneous Nightly    predniSONE  10 mg Oral Daily    levoFLOXacin  500 mg Oral Daily    atorvastatin  40 mg Oral Daily    fluticasone  2 spray Nasal Daily    guaiFENesin  1,200 mg Oral QAM    levothyroxine  125 mcg Oral Daily    mirtazapine  30 mg Oral Nightly    therapeutic multivitamin-minerals  1 tablet Oral Daily    nadolol  20 mg Oral Daily    pantoprazole  40 mg Oral QAM AC    sertraline  200 mg Oral Daily    sodium chloride flush  5-40 mL IntraVENous 2 times per day    enoxaparin  40 mg SubCUTAneous Daily    cetirizine  10 mg Oral Daily    pseudoephedrine  120 mg Oral BID     Continuous Infusions:   dextrose      sodium chloride       PRN Meds:  traMADol, albuterol, benzonatate, hydrOXYzine, LORazepam, glucagon (rDNA), dextrose, sodium chloride flush, sodium chloride, ondansetron **OR** ondansetron, polyethylene glycol, acetaminophen **OR** acetaminophen, perflutren lipid microspheres, dextrose bolus **OR** dextrose bolus, glucose    Labs:  CBC:   Recent Labs     05/06/22  0934 05/07/22  0440 05/08/22  0505   WBC 11.2* 11.2* 14.2*   HGB 9.9* 9.9* 11.0*   HCT 32.1* 31.7* 35.8*   MCV 76.0* 76.6* 77.0*    306 330     BMP:   Recent Labs     05/06/22  1911 05/07/22  0440 05/08/22  0505    137 138   K 4.0 4.0 3.8   CL 96* 97* 95*   CO2 23 28 33*   PHOS 3.6  --   --    BUN 14 14 18   CREATININE 0.6 <0.5* 0.6       BNP 7611  PCT 0.08     Chest imaging was reviewed by me and showed CTPA 5/6/22  No pulmonary embolus is identified.       Interstitial fibrosis with ground-glass opacities.  The ground-glass   opacities are stable since 04/18/2022 and moderately progressed since   02/08/2022.  This may represent progressive interstitial disease, NSIP   pattern.  Superimposed pneumonia/pneumonitis or pulmonary edema are also   possible.       Stable mediastinal adenopathy.            ASSESSMENT:  · Acute on chronic hypoxic respiratory failure  · Elevated BNP  · ILD with progressive fibrosis; ; CT was stable form previous last month     PLAN:  · Supplemental oxygen to maintain SaO2 >92%; wean as tolerated   · Lasix per cardiology  · Prednisone to 10mg daily and maintain until next visit  · Marylin Skiff has been approved but not started yet  · No h/o MDRO - deescalated to oral levaquin  Ok to d/c

## 2022-05-08 NOTE — PROGRESS NOTES
RT Inhaler-Nebulizer Bronchodilator Protocol Note    There is a bronchodilator order in the chart from a provider indicating to follow the RT Bronchodilator Protocol and there is an Initiate RT Inhaler-Nebulizer Bronchodilator Protocol order as well (see protocol at bottom of note). CXR Findings:  XR CHEST PORTABLE    Result Date: 5/6/2022  Multifocal interstitial-alveolar opacities, suspected pneumonia or pulmonary edema superimposed on fibrosis. The findings from the last RT Protocol Assessment were as follows:   History Pulmonary Disease: (P) Chronic pulmonary disease  Respiratory Pattern: (P) Dyspnea on exertion or RR 21-25 bpm  Breath Sounds: (P) Slightly diminished and/or crackles  Cough: (P) Strong, spontaneous, non-productive  Indication for Bronchodilator Therapy: (P) Decreased or absent breath sounds  Bronchodilator Assessment Score: (P) 6    Aerosolized bronchodilator medication orders have been revised according to the RT Inhaler-Nebulizer Bronchodilator Protocol below. Respiratory Therapist to perform RT Therapy Protocol Assessment initially then follow the protocol. Repeat RT Therapy Protocol Assessment PRN for score 0-3 or on second treatment, BID, and PRN for scores above 3. No Indications - adjust the frequency to every 6 hours PRN wheezing or bronchospasm, if no treatments needed after 48 hours then discontinue using Per Protocol order mode. If indication present, adjust the RT bronchodilator orders based on the Bronchodilator Assessment Score as indicated below. Use Inhaler orders unless patient has one or more of the following: on home nebulizer, not able to hold breath for 10 seconds, is not alert and oriented, cannot activate and use MDI correctly, or respiratory rate 25 breaths per minute or more, then use the equivalent nebulizer order(s) with same Frequency and PRN reasons based on the score.   If a patient is on this medication at home then do not decrease Frequency below that used at home. 0-3 - enter or revise RT bronchodilator order(s) to equivalent RT Bronchodilator order with Frequency of every 4 hours PRN for wheezing or increased work of breathing using Per Protocol order mode. 4-6 - enter or revise RT Bronchodilator order(s) to two equivalent RT bronchodilator orders with one order with BID Frequency and one order with Frequency of every 4 hours PRN wheezing or increased work of breathing using Per Protocol order mode. 7-10 - enter or revise RT Bronchodilator order(s) to two equivalent RT bronchodilator orders with one order with TID Frequency and one order with Frequency of every 4 hours PRN wheezing or increased work of breathing using Per Protocol order mode. 11-13 - enter or revise RT Bronchodilator order(s) to one equivalent RT bronchodilator order with QID Frequency and an Albuterol order with Frequency of every 4 hours PRN wheezing or increased work of breathing using Per Protocol order mode. Greater than 13 - enter or revise RT Bronchodilator order(s) to one equivalent RT bronchodilator order with every 4 hours Frequency and an Albuterol order with Frequency of every 2 hours PRN wheezing or increased work of breathing using Per Protocol order mode.          Electronically signed by Kyara Gutierrez RCP on 5/8/2022 at 8:34 AM

## 2022-05-08 NOTE — PROGRESS NOTES
Progress Note    Admit Date:  5/6/2022    69 y.o. female Hx of  (crypt org PNA), Afib, chronic hypox Resp failure 3l/min at baseline, who presents with worsening severe dyspnea. Pt reports had to use up to 5l/min on her home concentrator and still o2 at 81% and worsening dyspnea. Admitted for CHF, ILD, and anemia     Subjective:  Ms. Bienvenido Garcia seen sitting up. Feels okay. Remains on 5 L o2. Objective:   Patient Vitals for the past 4 hrs:   BP Temp Temp src Pulse Resp SpO2   05/08/22 0849 118/60 97.5 °F (36.4 °C) Oral 81 18 96 %   05/08/22 0833 -- -- -- -- -- 91 %            Intake/Output Summary (Last 24 hours) at 5/8/2022 0936  Last data filed at 5/8/2022 0848  Gross per 24 hour   Intake 522 ml   Output 2700 ml   Net -2178 ml       Physical Exam:  Gen: No distress. Alert. Eyes: PERRL. No sclera icterus. No conjunctival injection. ENT: No discharge. Pharynx clear. Neck:  Trachea midline. Resp: No accessory muscle use. + crackles. No wheezes. No rhonchi. CV: Regular rate. Regular rhythm. No murmur. No rub. No edema. Capillary Refill: Brisk,< 3 seconds   Peripheral Pulses: +2 palpable, equal bilaterally   GI: Non-tender. Non-distended. Normal bowel sounds. No hernia. Skin: Warm and dry. No nodule on exposed extremities. No rash on exposed extremities. M/S: No cyanosis. No joint deformity. No clubbing. Neuro: Awake. Grossly nonfocal    Psych: Oriented x 3.  No anxiety or agitation    Data:  CBC:   Recent Labs     05/06/22  0934 05/07/22  0440 05/08/22  0505   WBC 11.2* 11.2* 14.2*   HGB 9.9* 9.9* 11.0*   HCT 32.1* 31.7* 35.8*   MCV 76.0* 76.6* 77.0*    306 330     BMP:   Recent Labs     05/06/22  1911 05/07/22  0440 05/08/22  0505    137 138   K 4.0 4.0 3.8   CL 96* 97* 95*   CO2 23 28 33*   PHOS 3.6  --   --    BUN 14 14 18   CREATININE 0.6 <0.5* 0.6     LIVER PROFILE:   Recent Labs     05/06/22  0934   AST 20   ALT 16   BILITOT 0.5   ALKPHOS 91     PT/INR: No results for input(s): PROTIME, INR in the last 72 hours. CULTURES  Blood: NGTD  COVID: not detected  Influenza: negative    RADIOLOGY  CT CHEST PULMONARY EMBOLISM W CONTRAST   Final Result   No pulmonary embolus is identified. Interstitial fibrosis with ground-glass opacities. The ground-glass   opacities are stable since 04/18/2022 and moderately progressed since   02/08/2022. This may represent progressive interstitial disease, NSIP   pattern. Superimposed pneumonia/pneumonitis or pulmonary edema are also   possible. Stable mediastinal adenopathy. XR CHEST PORTABLE   Final Result   Multifocal interstitial-alveolar opacities, suspected pneumonia or pulmonary   edema superimposed on fibrosis. Assessment/Plan:  Acute on chronic hypoxic respiratory failure  - baseline 3 L O2  - currently on 5 L- per pulmonology okay to d/c  - CXR with multifocal interstitial alveolar opacities - PNA vs pulm edema, superimposed on known fibrosis. Acute on chronic Diastolic CHF  - cardiology consulted  - daily weights; I&O's  - net negative 4.8 L  - transitioned to PO lasix  - repeat Echo Monday  - Needs cardiology follow up as outpatient. Can possibly d/c tomorrow. ILD/COPD   Pneumonia, possible gram negative organism, risk for MRSA  - pulmonology consult  - continue Mucinex. Was on Merrem/Vanc. De-escalated antibiotics to Levaquin.   - levaquin D#3, prednisone 10 mg daily until f/u OP with pulmonologist.   Erin Victor    DM type 2  - monitor glucose. SSI coverage    Hypothyroidism  - home dose of synthroid 125 mcg     Hyperlipidemia  - on statin    Anxiety  - PRN Ativan    Anemia, microcytic/hypochromic  - checked iron studies  - H/H improved today. Hypertension   - BP stable. Continue Nadolol. Leukocytosis   - possibly related to steroids. Up to 14.2  - continue Levaquin, prednisone  - monitor CBC    DVT Prophylaxis: Lovenox  Diet: ADULT DIET;  Regular; 4 carb choices (60 gm/meal)  Code Status: Full Code      Sam Parra [facility]  5/8/2022

## 2022-05-08 NOTE — PROGRESS NOTES
Patient c/o nausea and pain in left side at this time. PRN zofran given. Patient states she uses lidocaine patch at home for her broken ribs, message sent to OSMAN Chavarria. New order for lidocaine patch obtained. Patient also states she is having some anxiety, PRN ativan given.

## 2022-05-08 NOTE — PROGRESS NOTES
05/07/22 2000   RT Protocol   History Pulmonary Disease 2   Respiratory pattern 2   Breath sounds 2   Cough 0   Indications for Bronchodilator Therapy Decreased or absent breath sounds   Bronchodilator Assessment Score 6   RT Inhaler-Nebulizer Bronchodilator Protocol Note    There is a bronchodilator order in the chart from a provider indicating to follow the RT Bronchodilator Protocol and there is an Initiate RT Inhaler-Nebulizer Bronchodilator Protocol order as well (see protocol at bottom of note). CXR Findings:  XR CHEST PORTABLE    Result Date: 5/6/2022  Multifocal interstitial-alveolar opacities, suspected pneumonia or pulmonary edema superimposed on fibrosis. The findings from the last RT Protocol Assessment were as follows:   History Pulmonary Disease: Chronic pulmonary disease  Respiratory Pattern: Dyspnea on exertion or RR 21-25 bpm  Breath Sounds: Slightly diminished and/or crackles  Cough: Strong, spontaneous, non-productive  Indication for Bronchodilator Therapy: Decreased or absent breath sounds  Bronchodilator Assessment Score: 6    Aerosolized bronchodilator medication orders have been revised according to the RT Inhaler-Nebulizer Bronchodilator Protocol below. Respiratory Therapist to perform RT Therapy Protocol Assessment initially then follow the protocol. Repeat RT Therapy Protocol Assessment PRN for score 0-3 or on second treatment, BID, and PRN for scores above 3. No Indications - adjust the frequency to every 6 hours PRN wheezing or bronchospasm, if no treatments needed after 48 hours then discontinue using Per Protocol order mode. If indication present, adjust the RT bronchodilator orders based on the Bronchodilator Assessment Score as indicated below.   Use Inhaler orders unless patient has one or more of the following: on home nebulizer, not able to hold breath for 10 seconds, is not alert and oriented, cannot activate and use MDI correctly, or respiratory rate 25 breaths per minute or more, then use the equivalent nebulizer order(s) with same Frequency and PRN reasons based on the score. If a patient is on this medication at home then do not decrease Frequency below that used at home. 0-3 - enter or revise RT bronchodilator order(s) to equivalent RT Bronchodilator order with Frequency of every 4 hours PRN for wheezing or increased work of breathing using Per Protocol order mode. 4-6 - enter or revise RT Bronchodilator order(s) to two equivalent RT bronchodilator orders with one order with BID Frequency and one order with Frequency of every 4 hours PRN wheezing or increased work of breathing using Per Protocol order mode. 7-10 - enter or revise RT Bronchodilator order(s) to two equivalent RT bronchodilator orders with one order with TID Frequency and one order with Frequency of every 4 hours PRN wheezing or increased work of breathing using Per Protocol order mode. 11-13 - enter or revise RT Bronchodilator order(s) to one equivalent RT bronchodilator order with QID Frequency and an Albuterol order with Frequency of every 4 hours PRN wheezing or increased work of breathing using Per Protocol order mode. Greater than 13 - enter or revise RT Bronchodilator order(s) to one equivalent RT bronchodilator order with every 4 hours Frequency and an Albuterol order with Frequency of every 2 hours PRN wheezing or increased work of breathing using Per Protocol order mode.        Electronically signed by Shauna Preston RCP on 5/7/2022 at 8:08 PM

## 2022-05-08 NOTE — PROGRESS NOTES
Pt assisted to bsc. Pt requesting to sit in recliner. Pt assisted to recliner. Chair alarm on. Call light within reach.

## 2022-05-09 LAB
ANION GAP SERPL CALCULATED.3IONS-SCNC: 11 MMOL/L (ref 3–16)
BASOPHILS ABSOLUTE: 0.1 K/UL (ref 0–0.2)
BASOPHILS RELATIVE PERCENT: 0.5 %
BUN BLDV-MCNC: 18 MG/DL (ref 7–20)
CALCIUM SERPL-MCNC: 9.2 MG/DL (ref 8.3–10.6)
CHLORIDE BLD-SCNC: 94 MMOL/L (ref 99–110)
CO2: 31 MMOL/L (ref 21–32)
CREAT SERPL-MCNC: 0.7 MG/DL (ref 0.6–1.2)
EOSINOPHILS ABSOLUTE: 1.2 K/UL (ref 0–0.6)
EOSINOPHILS RELATIVE PERCENT: 8 %
GFR AFRICAN AMERICAN: >60
GFR NON-AFRICAN AMERICAN: >60
GLUCOSE BLD-MCNC: 108 MG/DL (ref 70–99)
GLUCOSE BLD-MCNC: 119 MG/DL (ref 70–99)
GLUCOSE BLD-MCNC: 121 MG/DL (ref 70–99)
GLUCOSE BLD-MCNC: 124 MG/DL (ref 70–99)
GLUCOSE BLD-MCNC: 164 MG/DL (ref 70–99)
HCT VFR BLD CALC: 36 % (ref 36–48)
HEMOGLOBIN: 11.3 G/DL (ref 12–16)
LV EF: 55 %
LVEF MODALITY: NORMAL
LYMPHOCYTES ABSOLUTE: 3.7 K/UL (ref 1–5.1)
LYMPHOCYTES RELATIVE PERCENT: 26 %
MAGNESIUM: 1.9 MG/DL (ref 1.8–2.4)
MCH RBC QN AUTO: 24.3 PG (ref 26–34)
MCHC RBC AUTO-ENTMCNC: 31.5 G/DL (ref 31–36)
MCV RBC AUTO: 77 FL (ref 80–100)
MONOCYTES ABSOLUTE: 1 K/UL (ref 0–1.3)
MONOCYTES RELATIVE PERCENT: 6.7 %
NEUTROPHILS ABSOLUTE: 8.4 K/UL (ref 1.7–7.7)
NEUTROPHILS RELATIVE PERCENT: 58.8 %
PDW BLD-RTO: 18.6 % (ref 12.4–15.4)
PERFORMED ON: ABNORMAL
PLATELET # BLD: 331 K/UL (ref 135–450)
PMV BLD AUTO: 6.7 FL (ref 5–10.5)
POTASSIUM SERPL-SCNC: 3.7 MMOL/L (ref 3.5–5.1)
RBC # BLD: 4.67 M/UL (ref 4–5.2)
SODIUM BLD-SCNC: 136 MMOL/L (ref 136–145)
WBC # BLD: 14.4 K/UL (ref 4–11)

## 2022-05-09 PROCEDURE — 2700000000 HC OXYGEN THERAPY PER DAY

## 2022-05-09 PROCEDURE — 6370000000 HC RX 637 (ALT 250 FOR IP)

## 2022-05-09 PROCEDURE — 94669 MECHANICAL CHEST WALL OSCILL: CPT

## 2022-05-09 PROCEDURE — 6370000000 HC RX 637 (ALT 250 FOR IP): Performed by: INTERNAL MEDICINE

## 2022-05-09 PROCEDURE — 99232 SBSQ HOSP IP/OBS MODERATE 35: CPT | Performed by: NURSE PRACTITIONER

## 2022-05-09 PROCEDURE — 6370000000 HC RX 637 (ALT 250 FOR IP): Performed by: HOSPITALIST

## 2022-05-09 PROCEDURE — 2580000003 HC RX 258

## 2022-05-09 PROCEDURE — 99232 SBSQ HOSP IP/OBS MODERATE 35: CPT | Performed by: INTERNAL MEDICINE

## 2022-05-09 PROCEDURE — 94640 AIRWAY INHALATION TREATMENT: CPT

## 2022-05-09 PROCEDURE — 80048 BASIC METABOLIC PNL TOTAL CA: CPT

## 2022-05-09 PROCEDURE — 93306 TTE W/DOPPLER COMPLETE: CPT

## 2022-05-09 PROCEDURE — 6370000000 HC RX 637 (ALT 250 FOR IP): Performed by: NURSE PRACTITIONER

## 2022-05-09 PROCEDURE — 83735 ASSAY OF MAGNESIUM: CPT

## 2022-05-09 PROCEDURE — 94761 N-INVAS EAR/PLS OXIMETRY MLT: CPT

## 2022-05-09 PROCEDURE — 6360000002 HC RX W HCPCS

## 2022-05-09 PROCEDURE — 85025 COMPLETE CBC W/AUTO DIFF WBC: CPT

## 2022-05-09 PROCEDURE — 36415 COLL VENOUS BLD VENIPUNCTURE: CPT

## 2022-05-09 PROCEDURE — 2060000000 HC ICU INTERMEDIATE R&B

## 2022-05-09 RX ADMIN — MIRTAZAPINE 30 MG: 30 TABLET, FILM COATED ORAL at 20:26

## 2022-05-09 RX ADMIN — PREDNISONE 10 MG: 10 TABLET ORAL at 09:04

## 2022-05-09 RX ADMIN — LORAZEPAM 0.5 MG: 0.5 TABLET ORAL at 00:47

## 2022-05-09 RX ADMIN — Medication 10 ML: at 20:27

## 2022-05-09 RX ADMIN — CETIRIZINE HYDROCHLORIDE 10 MG: 10 TABLET ORAL at 09:04

## 2022-05-09 RX ADMIN — ATORVASTATIN CALCIUM 40 MG: 40 TABLET, FILM COATED ORAL at 09:04

## 2022-05-09 RX ADMIN — IPRATROPIUM BROMIDE AND ALBUTEROL SULFATE 1 AMPULE: 2.5; .5 SOLUTION RESPIRATORY (INHALATION) at 21:47

## 2022-05-09 RX ADMIN — GUAIFENESIN 1200 MG: 600 TABLET, EXTENDED RELEASE ORAL at 09:04

## 2022-05-09 RX ADMIN — MULTIPLE VITAMINS W/ MINERALS TAB 1 TABLET: TAB at 09:04

## 2022-05-09 RX ADMIN — LEVOTHYROXINE SODIUM 125 MCG: 125 TABLET ORAL at 05:19

## 2022-05-09 RX ADMIN — NADOLOL 20 MG: 40 TABLET ORAL at 09:04

## 2022-05-09 RX ADMIN — LEVOFLOXACIN 500 MG: 500 TABLET, FILM COATED ORAL at 09:04

## 2022-05-09 RX ADMIN — TRAMADOL HYDROCHLORIDE 50 MG: 50 TABLET, COATED ORAL at 23:30

## 2022-05-09 RX ADMIN — ACETAMINOPHEN 650 MG: 325 TABLET ORAL at 20:26

## 2022-05-09 RX ADMIN — FLUTICASONE PROPIONATE 2 SPRAY: 50 SPRAY, METERED NASAL at 09:07

## 2022-05-09 RX ADMIN — ENOXAPARIN SODIUM 40 MG: 100 INJECTION SUBCUTANEOUS at 09:05

## 2022-05-09 RX ADMIN — PANTOPRAZOLE SODIUM 40 MG: 40 TABLET, DELAYED RELEASE ORAL at 05:19

## 2022-05-09 RX ADMIN — INSULIN LISPRO 1 UNITS: 100 INJECTION, SOLUTION INTRAVENOUS; SUBCUTANEOUS at 16:50

## 2022-05-09 RX ADMIN — SERTRALINE 200 MG: 100 TABLET, FILM COATED ORAL at 09:04

## 2022-05-09 RX ADMIN — IPRATROPIUM BROMIDE AND ALBUTEROL SULFATE 1 AMPULE: 2.5; .5 SOLUTION RESPIRATORY (INHALATION) at 09:00

## 2022-05-09 RX ADMIN — Medication 10 ML: at 09:04

## 2022-05-09 RX ADMIN — TRAMADOL HYDROCHLORIDE 50 MG: 50 TABLET, COATED ORAL at 09:02

## 2022-05-09 RX ADMIN — FUROSEMIDE 40 MG: 40 TABLET ORAL at 09:04

## 2022-05-09 RX ADMIN — PSEUDOEPHEDRINE HCL 120 MG: 120 TABLET, FILM COATED, EXTENDED RELEASE ORAL at 09:07

## 2022-05-09 RX ADMIN — ACETAMINOPHEN 650 MG: 325 TABLET ORAL at 11:21

## 2022-05-09 RX ADMIN — POLYETHYLENE GLYCOL (3350) 17 G: 17 POWDER, FOR SOLUTION ORAL at 11:22

## 2022-05-09 RX ADMIN — TRAMADOL HYDROCHLORIDE 50 MG: 50 TABLET, COATED ORAL at 17:34

## 2022-05-09 RX ADMIN — POTASSIUM CHLORIDE 40 MEQ: 1500 TABLET, EXTENDED RELEASE ORAL at 09:04

## 2022-05-09 ASSESSMENT — PAIN SCALES - GENERAL
PAINLEVEL_OUTOF10: 5
PAINLEVEL_OUTOF10: 7
PAINLEVEL_OUTOF10: 4
PAINLEVEL_OUTOF10: 4
PAINLEVEL_OUTOF10: 7
PAINLEVEL_OUTOF10: 3
PAINLEVEL_OUTOF10: 8
PAINLEVEL_OUTOF10: 7

## 2022-05-09 ASSESSMENT — PAIN DESCRIPTION - ORIENTATION
ORIENTATION: LEFT;LOWER
ORIENTATION: LEFT;LOWER
ORIENTATION: LOWER

## 2022-05-09 ASSESSMENT — PAIN DESCRIPTION - LOCATION
LOCATION: BACK
LOCATION: BACK;RIB CAGE

## 2022-05-09 ASSESSMENT — ENCOUNTER SYMPTOMS
GASTROINTESTINAL NEGATIVE: 1
BACK PAIN: 1
SHORTNESS OF BREATH: 1

## 2022-05-09 ASSESSMENT — PAIN DESCRIPTION - DESCRIPTORS: DESCRIPTORS: ACHING;DISCOMFORT

## 2022-05-09 ASSESSMENT — PAIN - FUNCTIONAL ASSESSMENT: PAIN_FUNCTIONAL_ASSESSMENT: PREVENTS OR INTERFERES SOME ACTIVE ACTIVITIES AND ADLS

## 2022-05-09 NOTE — PROGRESS NOTES
RT Inhaler-Nebulizer Bronchodilator Protocol Note    There is a bronchodilator order in the chart from a provider indicating to follow the RT Bronchodilator Protocol and there is an Initiate RT Inhaler-Nebulizer Bronchodilator Protocol order as well (see protocol at bottom of note). CXR Findings:  No results found. The findings from the last RT Protocol Assessment were as follows:   History Pulmonary Disease: (P) Chronic pulmonary disease  Respiratory Pattern: (P) Dyspnea on exertion or RR 21-25 bpm  Breath Sounds: (P) Slightly diminished and/or crackles  Cough: (P) Strong, spontaneous, non-productive  Indication for Bronchodilator Therapy: (P) Decreased or absent breath sounds  Bronchodilator Assessment Score: (P) 6    Aerosolized bronchodilator medication orders have been revised according to the RT Inhaler-Nebulizer Bronchodilator Protocol below. Respiratory Therapist to perform RT Therapy Protocol Assessment initially then follow the protocol. Repeat RT Therapy Protocol Assessment PRN for score 0-3 or on second treatment, BID, and PRN for scores above 3. No Indications - adjust the frequency to every 6 hours PRN wheezing or bronchospasm, if no treatments needed after 48 hours then discontinue using Per Protocol order mode. If indication present, adjust the RT bronchodilator orders based on the Bronchodilator Assessment Score as indicated below. Use Inhaler orders unless patient has one or more of the following: on home nebulizer, not able to hold breath for 10 seconds, is not alert and oriented, cannot activate and use MDI correctly, or respiratory rate 25 breaths per minute or more, then use the equivalent nebulizer order(s) with same Frequency and PRN reasons based on the score. If a patient is on this medication at home then do not decrease Frequency below that used at home.     0-3 - enter or revise RT bronchodilator order(s) to equivalent RT Bronchodilator order with Frequency of every 4 hours PRN for wheezing or increased work of breathing using Per Protocol order mode. 4-6 - enter or revise RT Bronchodilator order(s) to two equivalent RT bronchodilator orders with one order with BID Frequency and one order with Frequency of every 4 hours PRN wheezing or increased work of breathing using Per Protocol order mode. 7-10 - enter or revise RT Bronchodilator order(s) to two equivalent RT bronchodilator orders with one order with TID Frequency and one order with Frequency of every 4 hours PRN wheezing or increased work of breathing using Per Protocol order mode. 11-13 - enter or revise RT Bronchodilator order(s) to one equivalent RT bronchodilator order with QID Frequency and an Albuterol order with Frequency of every 4 hours PRN wheezing or increased work of breathing using Per Protocol order mode. Greater than 13 - enter or revise RT Bronchodilator order(s) to one equivalent RT bronchodilator order with every 4 hours Frequency and an Albuterol order with Frequency of every 2 hours PRN wheezing or increased work of breathing using Per Protocol order mode. RT to enter RT Home Evaluation for COPD & MDI Assessment order using Per Protocol order mode.     Electronically signed by Helena Valles RCP on 5/9/2022 at 9:06 AM

## 2022-05-09 NOTE — TELEPHONE ENCOUNTER
MD Kemi Rodriguez MA  Pt states esbriet was approved.  Please have pt get LFTs and office follow up 1 month after starting

## 2022-05-09 NOTE — PROGRESS NOTES
Pt assisted to BSC x 1 assist, pt A&O x 4, VSS. Shift assessment complete and all meds given per MAR. Pt reported pain 7/10 in lower left back, pain medication given. Beverage and ice provided, snack denied. Bed in lowest position, call light within reach with all personal belongings. Pt denies any further needs.

## 2022-05-09 NOTE — PLAN OF CARE
Problem: ABCDS Injury Assessment  Goal: Absence of physical injury  Outcome: Progressing     Problem: Pain  Goal: Verbalizes/displays adequate comfort level or baseline comfort level  Outcome: Progressing

## 2022-05-09 NOTE — PLAN OF CARE
Problem: Discharge Planning  Goal: Discharge to home or other facility with appropriate resources  Outcome: Progressing     Problem: Safety - Adult  Goal: Free from fall injury  Outcome: Progressing     Problem: ABCDS Injury Assessment  Goal: Absence of physical injury  5/9/2022 1039 by Osman Saavedra RN  Outcome: Progressing  5/8/2022 2308 by Corbin Day RN  Outcome: Progressing     Problem: Skin/Tissue Integrity  Goal: Absence of new skin breakdown  Description: 1. Monitor for areas of redness and/or skin breakdown  2. Assess vascular access sites hourly  3. Every 4-6 hours minimum:  Change oxygen saturation probe site  4. Every 4-6 hours:  If on nasal continuous positive airway pressure, respiratory therapy assess nares and determine need for appliance change or resting period.   Outcome: Progressing     Problem: Pain  Goal: Verbalizes/displays adequate comfort level or baseline comfort level  5/9/2022 1039 by Osman Saavedra RN  Outcome: Progressing  5/8/2022 2308 by Corbin Day RN  Outcome: Progressing

## 2022-05-09 NOTE — PROGRESS NOTES
05/09/22 0000   RT Protocol   History Pulmonary Disease 2   Respiratory pattern 2   Breath sounds 2   Cough 0   Indications for Bronchodilator Therapy Decreased or absent breath sounds   Bronchodilator Assessment Score 6   RT Inhaler-Nebulizer Bronchodilator Protocol Note    There is a bronchodilator order in the chart from a provider indicating to follow the RT Bronchodilator Protocol and there is an Initiate RT Inhaler-Nebulizer Bronchodilator Protocol order as well (see protocol at bottom of note). CXR Findings:  No results found. The findings from the last RT Protocol Assessment were as follows:   History Pulmonary Disease: Chronic pulmonary disease  Respiratory Pattern: Dyspnea on exertion or RR 21-25 bpm  Breath Sounds: Slightly diminished and/or crackles  Cough: Strong, spontaneous, non-productive  Indication for Bronchodilator Therapy: Decreased or absent breath sounds  Bronchodilator Assessment Score: 6    Aerosolized bronchodilator medication orders have been revised according to the RT Inhaler-Nebulizer Bronchodilator Protocol below. Respiratory Therapist to perform RT Therapy Protocol Assessment initially then follow the protocol. Repeat RT Therapy Protocol Assessment PRN for score 0-3 or on second treatment, BID, and PRN for scores above 3. No Indications - adjust the frequency to every 6 hours PRN wheezing or bronchospasm, if no treatments needed after 48 hours then discontinue using Per Protocol order mode. If indication present, adjust the RT bronchodilator orders based on the Bronchodilator Assessment Score as indicated below. Use Inhaler orders unless patient has one or more of the following: on home nebulizer, not able to hold breath for 10 seconds, is not alert and oriented, cannot activate and use MDI correctly, or respiratory rate 25 breaths per minute or more, then use the equivalent nebulizer order(s) with same Frequency and PRN reasons based on the score.   If a patient is on this medication at home then do not decrease Frequency below that used at home. 0-3 - enter or revise RT bronchodilator order(s) to equivalent RT Bronchodilator order with Frequency of every 4 hours PRN for wheezing or increased work of breathing using Per Protocol order mode. 4-6 - enter or revise RT Bronchodilator order(s) to two equivalent RT bronchodilator orders with one order with BID Frequency and one order with Frequency of every 4 hours PRN wheezing or increased work of breathing using Per Protocol order mode. 7-10 - enter or revise RT Bronchodilator order(s) to two equivalent RT bronchodilator orders with one order with TID Frequency and one order with Frequency of every 4 hours PRN wheezing or increased work of breathing using Per Protocol order mode. 11-13 - enter or revise RT Bronchodilator order(s) to one equivalent RT bronchodilator order with QID Frequency and an Albuterol order with Frequency of every 4 hours PRN wheezing or increased work of breathing using Per Protocol order mode. Greater than 13 - enter or revise RT Bronchodilator order(s) to one equivalent RT bronchodilator order with every 4 hours Frequency and an Albuterol order with Frequency of every 2 hours PRN wheezing or increased work of breathing using Per Protocol order mode.      Electronically signed by Tamika Fischer RCP on 5/9/2022 at 12:57 AM

## 2022-05-09 NOTE — PROGRESS NOTES
[facility]  Cardiology  Progress Note    Admission date:  2022    Reason for follow up visit: CHF    HPI/CC: Alan Still is a 69 y.o. female who was admitted 2022 for shortness of breath. She has been treated for respiratory failure and CHF. Rhythm has been sinus. Subjective: Main complaint is back pain, some shortness of breath. No chest pain. Vitals:  Blood pressure (!) 151/91, pulse 81, temperature 96.9 °F (36.1 °C), temperature source Oral, resp. rate 17, height 5' 3\" (1.6 m), weight 155 lb (70.3 kg), SpO2 98 %, not currently breastfeeding.   Temp  Av.1 °F (36.2 °C)  Min: 96.9 °F (36.1 °C)  Max: 97.3 °F (36.3 °C)  Pulse  Av  Min: 66  Max: 81  BP  Min: 116/66  Max: 151/91  SpO2  Av.5 %  Min: 95 %  Max: 100 %    24 hour I/O    Intake/Output Summary (Last 24 hours) at 2022 1244  Last data filed at 2022 1236  Gross per 24 hour   Intake 350 ml   Output 300 ml   Net 50 ml     Current Facility-Administered Medications   Medication Dose Route Frequency Provider Last Rate Last Admin    furosemide (LASIX) tablet 40 mg  40 mg Oral Daily CALI Campbell MD   40 mg at 22 0904    lidocaine 4 % external patch 1 patch  1 patch TransDERmal Daily PANDA Belcher - CNP   1 patch at 22 0902    ipratropium-albuterol (DUONEB) nebulizer solution 1 ampule  1 ampule Inhalation BID Ann Mas MD   1 ampule at 22 0900    potassium chloride (KLOR-CON M) extended release tablet 40 mEq  40 mEq Oral Daily CALI Campbell MD   40 mEq at 22 0904    insulin lispro (HUMALOG) injection vial 0-6 Units  0-6 Units SubCUTAneous TID MILLY Jones MD   1 Units at 22 1145    insulin lispro (HUMALOG) injection vial 0-3 Units  0-3 Units SubCUTAneous [address] Mosher MD        predniSONE (DELTASONE) tablet 10 mg  10 mg Oral Daily Carclaudia Mas MD   10 mg at 22 0904    levoFLOXacin (LEVAQUIN) tablet 500 mg  500 mg Oral Daily James Richard MD   500 mg at 05/09/22 0904    traMADol (ULTRAM) tablet 50 mg  50 mg Oral Q6H PRN Joy Esteves MD   50 mg at 05/09/22 0902    albuterol (PROVENTIL) nebulizer solution 2.5 mg  2.5 mg Nebulization Q6H PRN FRANCISCA Palumbo   2.5 mg at 05/07/22 1614    benzonatate (TESSALON) capsule 200 mg  200 mg Oral TID PRN FRANCISCA Palumbo        atorvastatin (LIPITOR) tablet 40 mg  40 mg Oral Daily FRANCISCA Palumbo   40 mg at 05/09/22 0904    fluticasone (FLONASE) 50 MCG/ACT nasal spray 2 spray  2 spray Nasal Daily FRANCISCA Palumbo   2 spray at 05/09/22 6519    guaiFENesin (MUCINEX) extended release tablet 1,200 mg  1,200 mg Oral QAM FRANCISCA Palumbo   1,200 mg at 05/09/22 6949    hydrOXYzine (ATARAX) tablet 10 mg  10 mg Oral TID PRN FRANCISCA Palumbo        levothyroxine (SYNTHROID) tablet 125 mcg  125 mcg Oral Daily Pricila Oconnor [place]   125 mcg at 05/09/22 0519    LORazepam (ATIVAN) tablet 0.5 mg  0.5 mg Oral Q6H PRN FRANCISCA Palumbo   0.5 mg at 05/09/22 0047    mirtazapine (REMERON) tablet 30 mg  30 mg Oral Nightly FRANCISCA Palumbo   30 mg at 05/08/22 2024    therapeutic multivitamin-minerals 1 tablet  1 tablet Oral Daily FRANCISCA Palumbo   1 tablet at 05/09/22 0904    nadolol (CORGARD) tablet 20 mg  20 mg Oral Daily FRANCISCA Palumbo   20 mg at 05/09/22 0904    pantoprazole (PROTONIX) tablet 40 mg  40 mg Oral QAM FRANCISCA Estrada   40 mg at 05/09/22 0519    sertraline (ZOLOFT) tablet 200 mg  200 mg Oral Daily FRANCISCA Palumbo   200 mg at 05/09/22 0904    glucagon (rDNA) injection 1 mg  1 mg IntraMUSCular PRN FRANCISCA Palumbo        dextrose 5 % solution  100 mL/hr IntraVENous PRN FRANCISCA Palumbo        sodium chloride flush 0.9 % injection 5-40 mL  5-40 mL IntraVENous 2 times per day FRANCISCA Palumbo   10 mL at 05/09/22 0904    sodium chloride flush 0.9 % injection 5-40 mL  5-40 mL IntraVENous PRN FRANCISCA Palumbo        0.9 % sodium chloride infusion IntraVENous PRN FRANCISCA Moore        ondansetron (ZOFRAN-ODT) disintegrating tablet 4 mg  4 mg Oral Q8H PRN FRANCISCA Moore   4 mg at 05/08/22 1303    Or    ondansetron (ZOFRAN) injection 4 mg  4 mg IntraVENous Q6H PRN FRANCISCA Moore   4 mg at 05/06/22 1941    polyethylene glycol (GLYCOLAX) packet 17 g  17 g Oral Daily PRN FRANCISCA Moore   17 g at 05/09/22 1122    enoxaparin (LOVENOX) injection 40 mg  40 mg SubCUTAneous Daily FRANCISCA Moore   40 mg at 05/09/22 0905    acetaminophen (TYLENOL) tablet 650 mg  650 mg Oral Q6H PRN FRANCISCA Moore   650 mg at 05/09/22 1121    Or    acetaminophen (TYLENOL) suppository 650 mg  650 mg Rectal Q6H PRN FRANCISCA Moore        perflutren lipid microspheres (DEFINITY) injection 1.65 mg  1.5 mL IntraVENous ONCE PRN RFANCISCA Moore        cetirizine (ZYRTEC) tablet 10 mg  10 mg Oral Daily Darrius Saavedra MD   10 mg at 05/09/22 0904    pseudoephedrine (SUDAFED 12 HR) extended release tablet 120 mg  120 mg Oral BID Darrius Saavedra MD   120 mg at 05/09/22 0907    dextrose bolus 10% 125 mL  125 mL IntraVENous PRN Darrius Saavedra MD        Or    dextrose bolus 10% 250 mL  250 mL IntraVENous PRN Darrius Saavedra MD        glucose chewable tablet 16 g  16 g Oral PRN Darrius Saavedra MD           Review of Systems   Constitutional: Positive for fatigue. Respiratory: Positive for shortness of breath. Cardiovascular: Negative. Gastrointestinal: Negative. Musculoskeletal: Positive for back pain. Neurological: Negative.       Objective:     Telemetry monitor: SR    Physical Exam:  Constitutional:  Comfortable and alert, NAD, appears older than stated age, fatigued  Eyes: PERRL, sclera nonicteric  Neck:  Supple, no masses, no thyroidmegaly, no JVD  Skin:  Warm and dry; no rash or lesions  Heart: Regular, normal apex, S1 and S2 normal, no M/G/R  Lungs:  Normal respiratory effort; clear; no wheezing/rhonchi/rales  Abdomen: soft, non tender, + bowel sounds  Extremities:  No edema or cyanosis; no clubbing  Neuro: alert and oriented, moves legs and arms equally, normal mood and affect    Data Reviewed:    Echo 5/2022:  Technically difficult examination. Apical views are off axis secondary to pt    left ribs fracture.    Left ventricular systolic function is normal with ejection fraction    estimated at 55%.    No regional wall motion abnormalities.    There is mild concentric left ventricular hypertrophy.    Indeterminate left ventricular filling pressure.    Systolic pulmonary artery pressure (SPAP) is normal estimated at 22 mmHg    (Right atrial pressure of 8 mmHg).      Lab Reviewed:     Renal Profile:  Lab Results   Component Value Date    CREATININE 0.7 05/09/2022    BUN 18 05/09/2022     05/09/2022    K 3.7 05/09/2022    K 3.7 05/06/2022    CL 94 05/09/2022    CO2 31 05/09/2022     CBC:    Lab Results   Component Value Date    WBC 14.4 05/09/2022    RBC 4.67 05/09/2022    HGB 11.3 05/09/2022    HCT 36.0 05/09/2022    MCV 77.0 05/09/2022    RDW 18.6 05/09/2022     05/09/2022     BNP:    Lab Results   Component Value Date    PROBNP 7,611 05/06/2022    PROBNP 917 04/18/2022    PROBNP 1,005 02/08/2022    PROBNP 711 01/24/2022    PROBNP 266 09/27/2020     Fasting Lipid Panel:    Lab Results   Component Value Date    CHOL 110 11/17/2021    CHOL 182 02/17/2020    HDL 51 11/17/2021    TRIG 59 11/17/2021     Cardiac Enzymes:  CK/MbTroponin  Lab Results   Component Value Date    CKTOTAL 23 07/10/2019    TROPONINI <0.01 05/06/2022     PT/ INR   Lab Results   Component Value Date    INR 1.17 07/10/2019    INR 1.38 06/27/2019    INR 1.42 06/26/2019    PROTIME 13.3 07/10/2019    PROTIME 15.7 06/27/2019    PROTIME 16.0 06/26/2019     PTT No results found for: PTT   Lab Results   Component Value Date    MG 1.90 05/09/2022      Lab Results   Component Value Date    TSH 0.215 08/04/2021     All labs and imaging reviewed today    Assessment:  Acute on chronic diastolic CHF: improved  Atrial tachycardia: noted on 2019 EKG  Acute on chronic respiratory failure  HTN  DM  COPD  ILD    Plan:   1. Reviewed echo results with patient, normal LVEF/wall motion  2. Continue po lasix 40 mg daily  3. Sodium and fluid restriction  4. Cardiology will sign off, please call with questions. Follow up will be arranged.      Filomena Hunt, PANDA-CNP  [address]  [phone]

## 2022-05-09 NOTE — CARE COORDINATION
INTERDISCIPLINARY PLAN OF CARE CONFERENCE    Date/Time: 5/9/2022 2:29 PM  Completed by: Venkatesh Sotomayor RN, Case Management      Patient Name:  Isaiah Blakely  YOB: 1955  Admitting Diagnosis: Shortness of breath [R06.02]  Acute on chronic respiratory failure with hypoxia ([facility] [address] [J96.21]  Pneumonia of both lower lobes due to infectious organism [J18.9]     Admit Date/Time:  5/6/2022  9:13 AM    Chart reviewed. Interdisciplinary team contacted or reviewed plan related to patient progress and discharge plans. Disciplines included Case Management, Nursing, and Dietitian. Current Status:ongoing; on 4L; increased pain  PT/OT recommendation for discharge plan of care: NA    Expected D/C Disposition:  Home  Discharge Plan Comments: Chart reviewed. Plan continues for pt to return home with spouse. Following for possible increased home O2 needs. Currently on 4L. [place] for home O2 at 3L baseline. CM called [facility] ([phone]) and spoke with Marisa. Per Marisa, pt has 2L continuous O2 order with a 5L concentrator. Home O2 in place on admit: Yes  Pt informed of need to bring portable home O2 tank on day of discharge for nursing to connect prior to leaving:  Yes  Verbalized agreement/Understanding:   Yes

## 2022-05-09 NOTE — PROGRESS NOTES
Patient sitting up on edge of bed eating super. Patient with c/o pain 8/10 left lower back and rib cage area. Prn tramadol given.

## 2022-05-09 NOTE — PROGRESS NOTES
Progress Note    Admit Date:  5/6/2022    69 y.o. female Hx of  (crypt org PNA), Afib, chronic hypox Resp failure 3l/min at baseline, who presents with worsening severe dyspnea. Pt reports had to use up to 5l/min on her home concentrator and still o2 at 81% and worsening dyspnea. Admitted for CHF, ILD, and anemia         Subjective:  Ms. Nathalie Hernández seen lying up in chair today . Sob remains stable, pt on 4 L today   Lying on left side although has left rib fractures, reports this is comfortable position . Constipation noted    Objective:   No data found. Intake/Output Summary (Last 24 hours) at 5/9/2022 0740  Last data filed at 5/9/2022 0500  Gross per 24 hour   Intake 220 ml   Output 350 ml   Net -130 ml       Physical Exam:  Gen: elderly female up in bed, No distress. Alert. Eyes: PERRL. No sclera icterus. No conjunctival injection. ENT: No discharge. Pharynx clear. Neck:  Trachea midline. Resp: No accessory muscle use. + crackles in both bases,  No wheezes. No rhonchi. CV: Regular rate. Regular rhythm. No murmur. No rub. No edema. Capillary Refill: Brisk,< 3 seconds   Peripheral Pulses: +2 palpable, equal bilaterally   GI: Non-tender. Non-distended. Normal bowel sounds. No hernia. Skin: Warm and dry. No nodule on exposed extremities. No rash on exposed extremities. M/S: No cyanosis. No joint deformity. No clubbing. Neuro: Awake. Grossly nonfocal    Psych: Oriented x 3.  No anxiety or agitation    Data:  CBC:   Recent Labs     05/07/22 0440 05/08/22  0505 05/09/22  0508   WBC 11.2* 14.2* 14.4*   HGB 9.9* 11.0* 11.3*   HCT 31.7* 35.8* 36.0   MCV 76.6* 77.0* 77.0*    330 331     BMP:   Recent Labs     05/06/22 1911 05/06/22 1911 05/07/22  0440 05/08/22  0505 05/09/22  0508      < > 137 138 136   K 4.0   < > 4.0 3.8 3.7   CL 96*   < > 97* 95* 94*   CO2 23   < > 28 33* 31   PHOS 3.6  --   --   --   --    BUN 14   < > 14 18 18   CREATININE 0.6   < > <0.5* 0.6 0.7    < > = values in this interval not displayed. LIVER PROFILE:   Recent Labs     05/06/22  0934   AST 20   ALT 16   BILITOT 0.5   ALKPHOS 91     PT/INR: No results for input(s): PROTIME, INR in the last 72 hours. CULTURES  Blood: NGTD  COVID: not detected  Influenza: negative    RADIOLOGY  CT CHEST PULMONARY EMBOLISM W CONTRAST   Final Result   No pulmonary embolus is identified. Interstitial fibrosis with ground-glass opacities. The ground-glass   opacities are stable since 04/18/2022 and moderately progressed since   02/08/2022. This may represent progressive interstitial disease, NSIP   pattern. Superimposed pneumonia/pneumonitis or pulmonary edema are also   possible. Stable mediastinal adenopathy. XR CHEST PORTABLE   Final Result   Multifocal interstitial-alveolar opacities, suspected pneumonia or pulmonary   edema superimposed on fibrosis. Assessment/Plan:    Acute on chronic hypoxic respiratory failure  - CXR with multifocal interstitial alveolar opacities - PNA vs pulm edema, superimposed on known fibrosis. Improving with diuresis and IV abx  - baseline 3 L O2  - currently on 5 L-    Acute on chronic Diastolic CHF  - ongoing diuresis with IV lasix  - daily weights; I&O's  - net negative 4.8 L  - transitioned to PO lasix  - repeat Echo Monday  - Needs cardiology follow up as outpatient. Can possibly d/c today     ILD/COPD   Pneumonia, possible gram negative organism, risk for MRSA  - pulmonology consulted ,  - continue Mucinex. Was on Merrem/Vanc. De-escalated antibiotics to Levaquin.   - levaquin D#4, prednisone 10 mg daily until f/u OP with pulmonologist.   - Duonebs  - pt to start [id number] soon    DM type 2  - monitor glucose. SSI coverage    Hypothyroidism  - home dose of synthroid 125 mcg     Hyperlipidemia  - on statin    Anxiety  - PRN Ativan    Anemia, microcytic/hypochromic  - checked iron studies  - H/H improved today. Hypertension   - BP stable.  Continue Nadolol. Leukocytosis   - possibly related to steroids. Up to 14.2  - continue Levaquin, prednisone  - monitor CBC    DVT Prophylaxis: Lovenox  Diet: ADULT DIET;  Regular; 4 carb choices (60 gm/meal)  Code Status: Full Code      Dc planning to home with PT    Chidi Madison MD, 5/9/2022 7:41 AM

## 2022-05-09 NOTE — PROGRESS NOTES
Pulmonary Progress Note  CC: Shortness of breath    Subjective:    Appears comfortable  4 L O2-uses 3 L at home      EXAM: BP (!) 148/77   Pulse 81   Temp 97.2 °F (36.2 °C) (Oral)   Resp 18   Ht 5' 3\" (1.6 m)   Wt 155 lb (70.3 kg)   SpO2 96%   BMI 27.46 kg/m²  on 4L  Constitutional:  No acute distress. Benedetta Ou HEENT: no scleral icterus  Neck: No tracheal deviation present. Cardiovascular: Normal heart sounds. Pulmonary/Chest: No wheezes. No rhonchi. Bilateral rales. No decreased breath sounds. No accessory muscle usage or stridor. Abdominal: Soft. Musculoskeletal: No cyanosis. No clubbing. Skin: Skin is warm and dry.      Scheduled Meds:   furosemide  40 mg Oral Daily    lidocaine  1 patch TransDERmal Daily    ipratropium-albuterol  1 ampule Inhalation BID    potassium chloride  40 mEq Oral Daily    insulin lispro  0-6 Units SubCUTAneous TID WC    insulin lispro  0-3 Units SubCUTAneous Nightly    predniSONE  10 mg Oral Daily    levoFLOXacin  500 mg Oral Daily    atorvastatin  40 mg Oral Daily    fluticasone  2 spray Nasal Daily    guaiFENesin  1,200 mg Oral QAM    levothyroxine  125 mcg Oral Daily    mirtazapine  30 mg Oral Nightly    therapeutic multivitamin-minerals  1 tablet Oral Daily    nadolol  20 mg Oral Daily    pantoprazole  40 mg Oral QAM AC    sertraline  200 mg Oral Daily    sodium chloride flush  5-40 mL IntraVENous 2 times per day    enoxaparin  40 mg SubCUTAneous Daily    cetirizine  10 mg Oral Daily    pseudoephedrine  120 mg Oral BID     Continuous Infusions:   dextrose      sodium chloride       PRN Meds:  traMADol, albuterol, benzonatate, hydrOXYzine, LORazepam, glucagon (rDNA), dextrose, sodium chloride flush, sodium chloride, ondansetron **OR** ondansetron, polyethylene glycol, acetaminophen **OR** acetaminophen, perflutren lipid microspheres, dextrose bolus **OR** dextrose bolus, glucose    Labs:  CBC:   Recent Labs     05/07/22  0440 05/08/22  4724 05/09/22  0508   WBC 11.2* 14.2* 14.4*   HGB 9.9* 11.0* 11.3*   HCT 31.7* 35.8* 36.0   MCV 76.6* 77.0* 77.0*    330 331     BMP:   Recent Labs     05/06/22 1911 05/06/22  1911 05/07/22  0440 05/08/22  0505 05/09/22  0508      < > 137 138 136   K 4.0   < > 4.0 3.8 3.7   CL 96*   < > 97* 95* 94*   CO2 23   < > 28 33* 31   PHOS 3.6  --   --   --   --    BUN 14   < > 14 18 18   CREATININE 0.6   < > <0.5* 0.6 0.7    < > = values in this interval not displayed.          CT chest 5/6  No pulmonary embolus is identified.    Interstitial fibrosis with ground-glass opacities.  The ground-glass   opacities are stable since 04/18/2022 and moderately progressed since   02/08/2022.    Stable mediastinal adenopathy        ASSESSMENT:  · Acute on chronic hypoxemic respiratory failure  · Elevated BNP  · ILD with progressive fibrosis; ; CT was stable form previous last month     PLAN:  · Supplemental oxygen to maintain SaO2 >92%; wean as tolerated   · Continue prednisone to 10 mg daily and maintain until next visit  · [employer] has been approved but not started yet  · Diuresis per cardiology  · No h/o MDRO - deescalated to oral levaquin day 4/5  · DC planning is okay with pulmonary

## 2022-05-09 NOTE — PROGRESS NOTES
Bedside Mobility Assessment Tool (BMAT):     Assessment Level 1- Sit and Shake    1. From a semi-reclined position, ask patient to sit up and rotate to a seated position at the side of the bed. Can use the bedrail. 2. Ask patient to reach out and grab your hand and shake making sure patient reaches across his/her midline. Pass- Patient is able to come to a seated position, maintain core strength. Maintains seated balance while reaching across midline. Move on to Assessment Level 2. Assessment Level 2- Stretch and Point   1. With patient in seated position at the side of the bed, have patient place both feet on the floor (or stool) with knees no higher than hips. 2. Ask patient to stretch one leg and straighten the knee, then bend the ankle/flex and point the toes. If appropriate, repeat with the other leg. Pass- Patient is able to demonstrate appropriate quad strength on intended weight bearing limb(s). Move onto Assessment Level 3. Assessment Level 3- Stand   1. Ask patient to elevate off the bed or chair (seated to standing) using an assistive device (cane, bedrail). 2. Patient should be able to raise buttocks off be and hold for a count of five. May repeat once. Pass- Patient maintains standing stability for at least 5 seconds, proceed to assessment level 4. Assessment Level 4- Walk   1. Ask patient to march in place at bedside. 2. Then ask patient to advance step and return each foot. Some medical conditions may render a patient from stepping backwards, use your best clinical judgement. Pass- Patient demonstrates balance while shifting weight and ability to step, takes independent steps, does not use assistive device patient is MOBILITY LEVEL 4. Mobility Level- 4     Patient is not able to demonstrated the ability to move from a reclining position to an upright position within the recliner.  however patient is alert, oriented and able to provide informed consent Yes

## 2022-05-10 LAB
ANION GAP SERPL CALCULATED.3IONS-SCNC: 11 MMOL/L (ref 3–16)
BASOPHILS ABSOLUTE: 0 K/UL (ref 0–0.2)
BASOPHILS RELATIVE PERCENT: 0.4 %
BUN BLDV-MCNC: 13 MG/DL (ref 7–20)
CALCIUM SERPL-MCNC: 9.2 MG/DL (ref 8.3–10.6)
CHLORIDE BLD-SCNC: 92 MMOL/L (ref 99–110)
CO2: 31 MMOL/L (ref 21–32)
CREAT SERPL-MCNC: 0.6 MG/DL (ref 0.6–1.2)
EOSINOPHILS ABSOLUTE: 1 K/UL (ref 0–0.6)
EOSINOPHILS RELATIVE PERCENT: 8.1 %
GFR AFRICAN AMERICAN: >60
GFR NON-AFRICAN AMERICAN: >60
GLUCOSE BLD-MCNC: 113 MG/DL (ref 70–99)
GLUCOSE BLD-MCNC: 144 MG/DL (ref 70–99)
GLUCOSE BLD-MCNC: 158 MG/DL (ref 70–99)
GLUCOSE BLD-MCNC: 93 MG/DL (ref 70–99)
GLUCOSE BLD-MCNC: 99 MG/DL (ref 70–99)
HCT VFR BLD CALC: 36.3 % (ref 36–48)
HEMOGLOBIN: 11.3 G/DL (ref 12–16)
LYMPHOCYTES ABSOLUTE: 3.1 K/UL (ref 1–5.1)
LYMPHOCYTES RELATIVE PERCENT: 25.1 %
MAGNESIUM: 1.9 MG/DL (ref 1.8–2.4)
MCH RBC QN AUTO: 23.8 PG (ref 26–34)
MCHC RBC AUTO-ENTMCNC: 31 G/DL (ref 31–36)
MCV RBC AUTO: 76.8 FL (ref 80–100)
MONOCYTES ABSOLUTE: 0.7 K/UL (ref 0–1.3)
MONOCYTES RELATIVE PERCENT: 5.5 %
NEUTROPHILS ABSOLUTE: 7.6 K/UL (ref 1.7–7.7)
NEUTROPHILS RELATIVE PERCENT: 60.9 %
PDW BLD-RTO: 18.3 % (ref 12.4–15.4)
PERFORMED ON: ABNORMAL
PERFORMED ON: NORMAL
PLATELET # BLD: 279 K/UL (ref 135–450)
PMV BLD AUTO: 6.6 FL (ref 5–10.5)
POTASSIUM SERPL-SCNC: 4.2 MMOL/L (ref 3.5–5.1)
RBC # BLD: 4.73 M/UL (ref 4–5.2)
SODIUM BLD-SCNC: 134 MMOL/L (ref 136–145)
WBC # BLD: 12.4 K/UL (ref 4–11)

## 2022-05-10 PROCEDURE — 97535 SELF CARE MNGMENT TRAINING: CPT

## 2022-05-10 PROCEDURE — 6370000000 HC RX 637 (ALT 250 FOR IP): Performed by: NURSE PRACTITIONER

## 2022-05-10 PROCEDURE — 6370000000 HC RX 637 (ALT 250 FOR IP)

## 2022-05-10 PROCEDURE — 97165 OT EVAL LOW COMPLEX 30 MIN: CPT

## 2022-05-10 PROCEDURE — 80048 BASIC METABOLIC PNL TOTAL CA: CPT

## 2022-05-10 PROCEDURE — 6370000000 HC RX 637 (ALT 250 FOR IP): Performed by: INTERNAL MEDICINE

## 2022-05-10 PROCEDURE — 6360000002 HC RX W HCPCS

## 2022-05-10 PROCEDURE — 85025 COMPLETE CBC W/AUTO DIFF WBC: CPT

## 2022-05-10 PROCEDURE — 94761 N-INVAS EAR/PLS OXIMETRY MLT: CPT

## 2022-05-10 PROCEDURE — 99232 SBSQ HOSP IP/OBS MODERATE 35: CPT | Performed by: INTERNAL MEDICINE

## 2022-05-10 PROCEDURE — 83735 ASSAY OF MAGNESIUM: CPT

## 2022-05-10 PROCEDURE — 97112 NEUROMUSCULAR REEDUCATION: CPT

## 2022-05-10 PROCEDURE — 6370000000 HC RX 637 (ALT 250 FOR IP): Performed by: HOSPITALIST

## 2022-05-10 PROCEDURE — 36415 COLL VENOUS BLD VENIPUNCTURE: CPT

## 2022-05-10 PROCEDURE — 2580000003 HC RX 258

## 2022-05-10 PROCEDURE — 97530 THERAPEUTIC ACTIVITIES: CPT

## 2022-05-10 PROCEDURE — 2060000000 HC ICU INTERMEDIATE R&B

## 2022-05-10 PROCEDURE — 2700000000 HC OXYGEN THERAPY PER DAY

## 2022-05-10 PROCEDURE — 97161 PT EVAL LOW COMPLEX 20 MIN: CPT

## 2022-05-10 RX ORDER — DOCUSATE SODIUM 100 MG/1
100 CAPSULE, LIQUID FILLED ORAL 2 TIMES DAILY
Status: DISCONTINUED | OUTPATIENT
Start: 2022-05-10 | End: 2022-05-12 | Stop reason: HOSPADM

## 2022-05-10 RX ORDER — PSEUDOEPHEDRINE HCL 120 MG/1
120 TABLET, FILM COATED, EXTENDED RELEASE ORAL DAILY
Status: DISCONTINUED | OUTPATIENT
Start: 2022-05-10 | End: 2022-05-12 | Stop reason: HOSPADM

## 2022-05-10 RX ORDER — IPRATROPIUM BROMIDE AND ALBUTEROL SULFATE 2.5; .5 MG/3ML; MG/3ML
1 SOLUTION RESPIRATORY (INHALATION) EVERY 4 HOURS PRN
Status: DISCONTINUED | OUTPATIENT
Start: 2022-05-10 | End: 2022-05-12 | Stop reason: HOSPADM

## 2022-05-10 RX ORDER — FERROUS SULFATE 325(65) MG
325 TABLET ORAL
Status: DISCONTINUED | OUTPATIENT
Start: 2022-05-11 | End: 2022-05-12 | Stop reason: HOSPADM

## 2022-05-10 RX ORDER — MIRTAZAPINE 15 MG/1
15 TABLET, FILM COATED ORAL NIGHTLY
Status: DISCONTINUED | OUTPATIENT
Start: 2022-05-10 | End: 2022-05-12 | Stop reason: HOSPADM

## 2022-05-10 RX ADMIN — ENOXAPARIN SODIUM 40 MG: 100 INJECTION SUBCUTANEOUS at 09:10

## 2022-05-10 RX ADMIN — PSEUDOEPHEDRINE HCL 120 MG: 120 TABLET, FILM COATED, EXTENDED RELEASE ORAL at 09:10

## 2022-05-10 RX ADMIN — MIRTAZAPINE 15 MG: 15 TABLET, FILM COATED ORAL at 20:44

## 2022-05-10 RX ADMIN — MULTIPLE VITAMINS W/ MINERALS TAB 1 TABLET: TAB at 09:09

## 2022-05-10 RX ADMIN — LEVOTHYROXINE SODIUM 125 MCG: 125 TABLET ORAL at 06:33

## 2022-05-10 RX ADMIN — ONDANSETRON 4 MG: 4 TABLET, ORALLY DISINTEGRATING ORAL at 12:10

## 2022-05-10 RX ADMIN — SERTRALINE 200 MG: 100 TABLET, FILM COATED ORAL at 09:09

## 2022-05-10 RX ADMIN — POTASSIUM CHLORIDE 40 MEQ: 1500 TABLET, EXTENDED RELEASE ORAL at 09:10

## 2022-05-10 RX ADMIN — FLUTICASONE PROPIONATE 2 SPRAY: 50 SPRAY, METERED NASAL at 09:19

## 2022-05-10 RX ADMIN — INSULIN LISPRO 1 UNITS: 100 INJECTION, SOLUTION INTRAVENOUS; SUBCUTANEOUS at 12:06

## 2022-05-10 RX ADMIN — NADOLOL 20 MG: 40 TABLET ORAL at 09:09

## 2022-05-10 RX ADMIN — Medication 10 ML: at 20:44

## 2022-05-10 RX ADMIN — Medication 10 ML: at 09:07

## 2022-05-10 RX ADMIN — GUAIFENESIN 1200 MG: 600 TABLET, EXTENDED RELEASE ORAL at 09:08

## 2022-05-10 RX ADMIN — PREDNISONE 10 MG: 10 TABLET ORAL at 09:08

## 2022-05-10 RX ADMIN — ATORVASTATIN CALCIUM 40 MG: 40 TABLET, FILM COATED ORAL at 09:09

## 2022-05-10 RX ADMIN — TRAMADOL HYDROCHLORIDE 50 MG: 50 TABLET, COATED ORAL at 15:07

## 2022-05-10 RX ADMIN — DOCUSATE SODIUM 100 MG: 100 CAPSULE, LIQUID FILLED ORAL at 20:44

## 2022-05-10 RX ADMIN — DOCUSATE SODIUM 100 MG: 100 CAPSULE, LIQUID FILLED ORAL at 13:17

## 2022-05-10 RX ADMIN — FUROSEMIDE 40 MG: 40 TABLET ORAL at 09:09

## 2022-05-10 RX ADMIN — PANTOPRAZOLE SODIUM 40 MG: 40 TABLET, DELAYED RELEASE ORAL at 06:33

## 2022-05-10 RX ADMIN — TRAMADOL HYDROCHLORIDE 50 MG: 50 TABLET, COATED ORAL at 09:08

## 2022-05-10 RX ADMIN — LEVOFLOXACIN 500 MG: 500 TABLET, FILM COATED ORAL at 09:09

## 2022-05-10 RX ADMIN — INSULIN LISPRO 1 UNITS: 100 INJECTION, SOLUTION INTRAVENOUS; SUBCUTANEOUS at 16:48

## 2022-05-10 ASSESSMENT — PAIN SCALES - GENERAL
PAINLEVEL_OUTOF10: 6
PAINLEVEL_OUTOF10: 5
PAINLEVEL_OUTOF10: 8
PAINLEVEL_OUTOF10: 8

## 2022-05-10 ASSESSMENT — PAIN DESCRIPTION - LOCATION
LOCATION: BACK
LOCATION: BACK

## 2022-05-10 ASSESSMENT — PAIN DESCRIPTION - ORIENTATION
ORIENTATION: LEFT
ORIENTATION: LOWER

## 2022-05-10 ASSESSMENT — PAIN DESCRIPTION - DESCRIPTORS
DESCRIPTORS: ACHING
DESCRIPTORS: ACHING

## 2022-05-10 NOTE — PROGRESS NOTES
Progress Note    Admit Date:  5/6/2022    69 y.o. female Hx of  (crypt org PNA), Afib, chronic hypox Resp failure 3l/min at baseline, who presents with worsening severe dyspnea. Pt reports had to use up to 5l/min on her home concentrator and still o2 at 81% and worsening dyspnea. Admitted for CHF, ILD, and anemia         Subjective:  Ms. Rowdy Guo seen sitting up in bed  . Sob remains stable, pt on 4 L today   Constipation reported    Objective:   No data found. Intake/Output Summary (Last 24 hours) at 5/10/2022 0751  Last data filed at 5/9/2022 2333  Gross per 24 hour   Intake 430 ml   Output 325 ml   Net 105 ml       Physical Exam:    Gen: elderly female up in bed, No distress. Alert. Eyes: PERRL. No sclera icterus. No conjunctival injection. ENT: No discharge. Pharynx clear. Neck:  Trachea midline. Resp: No accessory muscle use. + crackles in both bases,  No wheezes. No rhonchi. CV: Regular rate. Regular rhythm. No murmur. No rub. No edema. Capillary Refill: Brisk,< 3 seconds   Peripheral Pulses: +2 palpable, equal bilaterally   GI: Non-tender. Non-distended. Normal bowel sounds. No hernia. Skin: Warm and dry. No nodule on exposed extremities. No rash on exposed extremities. M/S: No cyanosis. No joint deformity. No clubbing. Neuro: Awake. Grossly nonfocal    Psych: Oriented x 3. No anxiety or agitation    Data:  CBC:   Recent Labs     05/08/22  0505 05/09/22  0508 05/10/22  0508   WBC 14.2* 14.4* 12.4*   HGB 11.0* 11.3* 11.3*   HCT 35.8* 36.0 36.3   MCV 77.0* 77.0* 76.8*    331 279     BMP:   Recent Labs     05/08/22  0505 05/09/22  0508 05/10/22  0508    136 134*   K 3.8 3.7 4.2   CL 95* 94* 92*   CO2 33* 31 31   BUN 18 18 13   CREATININE 0.6 0.7 0.6     LIVER PROFILE:     CULTURES  Blood: NGTD  COVID: not detected  Influenza: negative    RADIOLOGY  CT CHEST PULMONARY EMBOLISM W CONTRAST   Final Result   No pulmonary embolus is identified.       Interstitial fibrosis with ground-glass opacities. The ground-glass   opacities are stable since 04/18/2022 and moderately progressed since   02/08/2022. This may represent progressive interstitial disease, NSIP   pattern. Superimposed pneumonia/pneumonitis or pulmonary edema are also   possible. Stable mediastinal adenopathy. XR CHEST PORTABLE   Final Result   Multifocal interstitial-alveolar opacities, suspected pneumonia or pulmonary   edema superimposed on fibrosis. Assessment/Plan:    Acute on chronic hypoxic respiratory failure  - CXR with multifocal interstitial alveolar opacities - PNA vs pulm edema, superimposed on known fibrosis. Improving with diuresis and IV abx  - baseline 2-3 L O2  - currently on 4 L-    Acute on chronic Diastolic CHF  - ongoing diuresis with IV lasix  - daily weights; I&O's  - net negative 5 L  - transitioned to PO lasix  - repeat Echo Monday  - Needs cardiology follow up as outpatient. ILD/COPD   Pneumonia, possible gram negative organism, risk for MRSA  - pulmonology consulted ,  - continue Mucinex. Was on Merrem/Vanc. De-escalated antibiotics to Levaquin.   - levaquin D#5, prednisone 10 mg daily until f/u OP with pulmonologist.   - Cristopher  - pt to start [id number] soon    DM type 2  - monitor glucose. SSI coverage    Hypothyroidism  - home dose of synthroid 125 mcg     Hyperlipidemia  - on statin    Anxiety  - PRN Ativan    Anemia, microcytic/hypochromic  - checked iron studies- on iron supplement  - H/H improved today. Hypertension   - BP stable. Continue Nadolol. Leukocytosis   - possibly related to steroids. Up to 14.2 >12  - continue Levaquin, prednisone  - monitor CBC    DVT Prophylaxis: Lovenox  Diet: ADULT DIET;  Regular; 4 carb choices (60 gm/meal)  Code Status: Full Code      Dc planning to home with PT    Nato Clark MD, 5/10/2022 7:51 AM

## 2022-05-10 NOTE — PROGRESS NOTES
Pulmonary Progress Note  CC: Shortness of breath    Subjective:    Appears comfortable   4 L O2-uses 3 L at home      EXAM: /79   Pulse 78   Temp 96.9 °F (36.1 °C) (Oral)   Resp 18   Ht 5' 3\" (1.6 m)   Wt 153 lb 11.2 oz (69.7 kg)   SpO2 99%   BMI 27.23 kg/m²  on 4L  Constitutional:  No acute distress. Shaaron Money HEENT: no scleral icterus  Neck: No tracheal deviation present. Cardiovascular: Normal heart sounds. Pulmonary/Chest: No wheezes. No rhonchi. Bilateral rales. No decreased breath sounds. No accessory muscle usage or stridor. Abdominal: Soft. Musculoskeletal: No cyanosis. No clubbing. Skin: Skin is warm and dry.      Scheduled Meds:   pseudoephedrine  120 mg Oral Daily    furosemide  40 mg Oral Daily    lidocaine  1 patch TransDERmal Daily    ipratropium-albuterol  1 ampule Inhalation BID    potassium chloride  40 mEq Oral Daily    insulin lispro  0-6 Units SubCUTAneous TID WC    insulin lispro  0-3 Units SubCUTAneous Nightly    predniSONE  10 mg Oral Daily    levoFLOXacin  500 mg Oral Daily    atorvastatin  40 mg Oral Daily    fluticasone  2 spray Nasal Daily    guaiFENesin  1,200 mg Oral QAM    levothyroxine  125 mcg Oral Daily    mirtazapine  30 mg Oral Nightly    therapeutic multivitamin-minerals  1 tablet Oral Daily    nadolol  20 mg Oral Daily    pantoprazole  40 mg Oral QAM AC    sertraline  200 mg Oral Daily    sodium chloride flush  5-40 mL IntraVENous 2 times per day    enoxaparin  40 mg SubCUTAneous Daily     Continuous Infusions:   dextrose      sodium chloride       PRN Meds:  traMADol, albuterol, benzonatate, LORazepam, glucagon (rDNA), dextrose, sodium chloride flush, sodium chloride, ondansetron **OR** ondansetron, polyethylene glycol, acetaminophen **OR** acetaminophen, perflutren lipid microspheres, dextrose bolus **OR** dextrose bolus, glucose    Labs:  CBC:   Recent Labs     05/08/22  0505 05/09/22  0508 05/10/22  0508   WBC 14.2* 14.4* 12.4*   HGB 11.0* 11.3* 11.3*   HCT 35.8* 36.0 36.3   MCV 77.0* 77.0* 76.8*    331 279     BMP:   Recent Labs     05/08/22  0505 05/09/22  0508 05/10/22  0508    136 134*   K 3.8 3.7 4.2   CL 95* 94* 92*   CO2 33* 31 31   BUN 18 18 13   CREATININE 0.6 0.7 0.6          CT chest 5/6  No pulmonary embolus is identified. Interstitial fibrosis with ground-glass opacities.  The ground-glass   opacities are stable since 04/18/2022 and moderately progressed since   02/08/2022.    Stable mediastinal adenopathy        Echocardiogram 5/9/2022  Technically difficult examination. Apical views are off axis secondary to pt   left ribs fracture. Left ventricular systolic function is normal with ejection fraction   estimated at 55%. No regional wall motion abnormalities. There is mild concentric left ventricular hypertrophy. Indeterminate left ventricular filling pressure. Systolic pulmonary artery pressure (SPAP) is normal estimated at 22 mmHg   (Right atrial pressure of 8 mmHg).       ASSESSMENT:  · Acute on chronic hypoxemic respiratory failure  · Elevated BNP  · ILD with progressive fibrosis; ; CT was stable form previous last month     PLAN:  · Supplemental oxygen to maintain SaO2 >92%; wean as tolerated   · Continue prednisone to 10 mg daily and maintain until next visit  · Joelyn Radish has been approved but not started yet  · Diuresis per cardiology  · No h/o MDRO - deescalated to oral levaquin day 4/5  · DC planning is okay with pulmonary  · Discussed with internal medicine

## 2022-05-10 NOTE — PROGRESS NOTES
bars, Shower Chair  and comfort height toilet without grab bars   Equipment owned: rollator, Shower Chair, lift chair, home O2 (3 L) continuous, pulse ox and nebulizer      Preadmission Status:  Pt. Able to drive: No,  drive patient  Pt Fully independent with ADLs: No  Pt. Required assistance from family for: Patient's spouse does cooking, laundry, cleaning.  pt's spouse completes the yard work   Pt. Fully independent for transfers and gait and walked with rollator  History of falls Yes,  1 fall in last 6 months, recently holding onto couch and chair, 1 fall on easter holiday 2022 needed hospitalization   Pt stays in chair mostly when spouse is at work in the evenings. Patient walks short distances with rollator walker for ~10 ft with supervision to modified independence. Home PT 1x/week    Pain   Yes  Location: lower back and L flank  Rating: moderate /10  Pain Medicine Status: Received pain med prior to tx    Cognition    A&O x4   Able to follow 2 step commands    Subjective  Patient sitting on BSC with spouse present. Pt agreeable to this PT eval & tx. Upper Extremity ROM/Strength  Please see OT evaluation. Lower Extremity ROM / Strength   AROM WFL: Yes  ROM limitations: N/A    Strength Assessment (measured on a 0-5 scale):  R LE   Quad   3+   Ant Tib  3+   Hamstring 3+   Iliopsoas 3+  L LE  Quad   3+   Ant Tib  3+   Hamstring 3+   Iliopsoas 3+    Lower Extremity Sensation    WFL    Lower Extremity Proprioception:   WFL    Coordination and Tone  WFL    Balance  Sitting:  Fair -; CGA  Comments: less than 3 min    Standing: Fair -; CGA  Comments: less than 2 min tolerance.     Bed Mobility   Supine to Sit:    Min A   Sit to Supine:   Min A   Rolling:   Not Tested  Scooting in sitting: CGA  Scooting in supine:  Not Tested    Transfer Training     Sit to stand:   CGA  Stand to sit:   CGA  Bed to Chair:   CGA with use of gait belt and rolling walker (RW)    Gait gait completed as indicated below  Distance:      4 ft  Deviations (firm surface/linoleum):  decreased mike, increased BRY, forward flexed posture, decreased step length bilaterally and decreased stance time bilaterally  Assistive Device Used:    gait belt and rolling walker (RW)  Level of Assist:    CGA  Comment: Patient was limited in walking distance due to fatigue. Stair Training deferred, pt unsafe/ not appropriate to complete stairs at this time     Activity Tolerance   Pt completed therapy session with Pain noted with all functional mobility  SOB noted with all functional mobility   SpO2: stayed above 95% through out the session. Positioning Needs   Pt up in chair, alarm set, positioned in proper neutral alignment and pressure relief provided. Call light provided and all needs within reach    Exercises Initiated  all completed bilaterally unless indicated and Dimitry deferred secondary to pt fatigued  NA    Other  None. Patient/Family Education   Pt educated on role of inpatient PT, POC, importance of continued activity, DC recommendations, safety awareness, transfer techniques, pursed lip breathing, energy conservation, pacing activity and calling for assist with mobility. Assessment  Pt seen for Physical Therapy evaluation in acute care setting. Pt demonstrated decreased Activity tolerance, Balance, ROM, Safety and Strength as well as decreased independence with Ambulation, Bed Mobility  and Transfers. Recommending SNF upon discharge as patient functioning well below baseline, demonstrates good rehab potential and unable to return home due to limited or no family support, inability to negotiate stairs to enter home/bedroom/bathroom, burden of care beyond caregiver ability, home environment not conducive to patient recovery and limited safety awareness. Goals : To be met in 3 visits:  1). Independent with LE Ex x 10 reps    To be met in 6 visits:  1). Supine to/from sit: SBA  2). Sit to/from stand: SBA  3). Bed to chair: SBA  4). Gait: Ambulate 30 ft.   with  CGA and use of LRAD (least restrictive assistive device)  5). Tolerate B LE exercises 3 sets of 10-15 reps  6). Ascend/descend 3 steps with CGA with use of hand rail unilateral and LRAD (least restrictive assistive device)    Rehabilitation Potential: Good  Strengths for achieving goals include:   PLOF   Barriers to achieving goals include:    Complexity of condition and Pain    Plan    To be seen 3-5 x / week  while in acute care setting for therapeutic exercises, bed mobility, transfers, progressive gait training, balance training, and family/patient education. Signature: Vicky Carbajal MS PT, # [id number]    If patient discharges from this facility prior to next visit, this note will serve as the Discharge Summary.

## 2022-05-10 NOTE — CARE COORDINATION
INTERDISCIPLINARY PLAN OF CARE CONFERENCE    Date/Time: 5/10/2022 3:29 PM  Completed by: Eliazar Dalal RN, Case Management      Patient Name:  Kylie Guzman  YOB: 1955  Admitting Diagnosis: Shortness of breath [R06.02]  Acute on chronic respiratory failure with hypoxia ([facility] [address] [J96.21]  Pneumonia of both lower lobes due to infectious organism [J18.9]     Admit Date/Time:  5/6/2022  9:13 AM    Chart reviewed. Interdisciplinary team contacted or reviewed plan related to patient progress and discharge plans. Disciplines included Case Management, Nursing, and Dietitian. Current Status:ongoing  PT/OT recommendation for discharge plan of care: SNF    Expected D/C Disposition:  Home  Confirmed plan with patient and/or family Yes confirmed with: (name) pt  Met with:pt  Discharge Plan Comments: Reviewed chart. Role of discharge planner explained and patient verbalized understanding. Pt states that she lives at [facility] with her spouse. Pt is aware that PT/OT recommend a SNF. CM provided a SNF list. Pt declines a SNF. Pt states that her daughter and daughter in law will be taking turns being with her 24/7. Pt states she would like HC for PT/OT/SN/SW. Pt requested [facility]. CM left a M for Dre with [facility]. to inquire if he could take pt for a d/c to home tomorrow, as pt lives in [place] and has [employer]. +HC orders, +start of CAROLANN for PT/OT/SN/SW. Pt is active with [facility] for home O2. Per Marisa with [facility]), pt's O2 orders are for 2L baseline and pt has a 5L concentrator. Pt states she has self increased her O2 to 3L. Pt is currently 94% on 4L. If pt is needing greater than 2L at d/c, pt will need a walk test and the walk test and new orders will need to be faxed to [facility] to fax # [phone]. Pt also states that she has an appointment already set up for 6/7/2022 about trying to get Passport.      Addendum 5/10/2022 1556: Per Dre with [facility] he can accept pt and he states that he will obtain the orders from [address] [place] [address] Rd. Home O2 in place on admit: Yes  Pt informed of need to bring portable home O2 tank on day of discharge for nursing to connect prior to leaving:  Yes  Verbalized agreement/Understanding:   Yes

## 2022-05-10 NOTE — DISCHARGE INSTR - COC
Continuity of Care Form    Patient Name: aSda Cruz   :  1955  MRN:  [id number]    Admit date:  2022  Discharge date:  ***    Code Status Order: Full Code   Advance Directives:      Admitting Physician:  Kimi Sr MD  PCP: Timothy Meneses MD    Discharging Nurse: [facility] Unit/Room#: /8737-06  Discharging Unit Phone Number: ***    Emergency Contact:   Extended Emergency Contact Information  Primary Emergency Contact: [facility]  Address: [facility]           [facility] Angélica Duran [address] [place] [address] Phone: [phone]  Mobile Phone: [phone]  Relation: Spouse  Secondary Emergency Contact: Eleazar Liriano [facility] Phone: [phone]  Mobile Phone: [phone]  Relation: Child    Past Surgical History:  Past Surgical History:   Procedure Laterality Date    ARM SURGERY Left 3/2/2020    LEFT ULNAR NERVE DECOMPRESSION AT THE ELBOW performed by Jona Goncalves MD at [address] [place] [address] 2019    EBUS WF W/ANES.  performed by Nisa Quiñones MD at [address] [place] [address]  2019    BRONCHOSCOPY/TRANSBRONCHIAL NEEDLE BIOPSY performed by Nisa Quiñones MD at [address] [place] [address]  2019    BRONCHOSCOPY/TRANSBRONCHIAL LUNG BIOPSY performed by Nisa Quiñones MD at [address] [place] [address]  2019    BRONCHOSCOPY ALVEOLAR LAVAGE performed by Nisa Quiñones MD at [address] [place] [address]  07/10/2019    BRONCHOSCOPY N/A 7/10/2019    BRONCHOSCOPY ALVEOLAR LAVAGE performed by Bryce Ochoa MD at [address] [place] [address] Bilateral 2019    BRONCHOSCOPY N/A 2019    BRONCHOSCOPY ALVEOLAR LAVAGE performed by Senthil Mccallum MD at [address] [place] [address] N/A 2019    BRONCHOSCOPY ALVEOLAR LAVAGE performed by Bonnie Conner MD at [place] [address], TOTAL ABDOMINAL      partial       Immunization History:   Immunization History   Administered Date(s) Administered    COVID-19, Moderna, Primary or Immunocompromised, PF, 100mcg/0.5mL 03/25/2021, 04/22/2021    Influenza Vaccine, unspecified formulation 02/13/2016    Influenza Virus Vaccine 01/15/2016, 02/13/2016    Influenza, Quadv, adjuvanted, 65 yrs +, IM, PF (Fluad) 11/23/2020, 11/17/2021    Influenza, Triv, inactivated, subunit, adjuvanted, IM (Fluad 65 yrs and older) 11/14/2019    Pneumococcal Conjugate 13-valent ([id number]) 06/18/2019    Pneumococcal Polysaccharide ([id number]) 11/14/2013, 04/03/2014    Tdap (Boostrix, Adacel) 02/04/2021       Active Problems:  Patient Active Problem List   Diagnosis Code    Chest pain R07.9    HTN (hypertension) I10    Hyperlipidemia with target LDL less than 130 E78.5    Insomnia G47.00    Trochanteric bursitis of both hips M70.61, M70.62    Migraine G43.909    Syncope R55    Acute blood loss anemia D62    Fatigue R53.83    Hypothyroidism E03.9    Mild single current episode of major depressive disorder ([facility]) F32.0    Anxiety F41.9    Pneumonia of both lower lobes due to infectious organism J18.9    A-fib ([facility]) I48.91    Atypical pneumonia J18.9    Acute bronchitis with bronchospasm J20.9    Acute on chronic diastolic CHF (congestive heart failure) ([facility]) I50.33    Class 2 obesity with body mass index (BMI) of 39.0 to 39.9 in adult E66.9, Z68.39    Abnormal chest CT S62.32    Acute diastolic heart failure ([facility]) I50.31    ILD (interstitial lung disease) ([facility]) J84.9    Acute on chronic respiratory failure with hypoxia ([facility]) J96.21    Restrictive lung disease J98.4    Abnormal CT of the chest R93.89    SOB (shortness of breath) R06.02    Acute cystitis without hematuria N30.00    Shortness of breath R06.02    Chronic respiratory failure with hypoxia ([facility]) J96.11    Cryptogenic organizing pneumonia ([facility]) J84.116    Pneumothorax of left lung after biopsy J95.811    COPD (chronic obstructive pulmonary disease) ([facility]) J44.9    Type 2 diabetes mellitus without complication ([facility]) S58.9    Hyponatremia E87.1    Numbness and tingling in left hand R20.0, R20.2    Ulnar neuropathy at elbow of left upper extremity G56.22    Chronic congestive heart failure ([facility]) I50.9    Left wrist pain M25.532    Left wrist tendinitis M77.8    GERD (gastroesophageal reflux disease) B57.9    Toxic metabolic encephalopathy Z04.6    VIRGINIE (acute kidney injury) ([facility] [address] N17.9    Lumbar radiculopathy M54.16    Acute on chronic respiratory failure ([facility]) J96.20    Rib pain on left side R07.81    Elevated brain natriuretic peptide (BNP) level R79.89       Isolation/Infection:   Isolation            No Isolation          Patient Infection Status       Infection Onset Added Last Indicated Last Indicated By Review Planned Expiration Resolved Resolved By    None active    Resolved    COVID-19 (Rule Out) 05/06/22 05/06/22 05/06/22 COVID-19, Rapid (Ordered)   05/06/22 Rule-Out Test Resulted    COVID-19 (Rule Out) 04/18/22 04/18/22 04/18/22 COVID-19 & Influenza Combo (Ordered)   04/18/22 Rule-Out Test Resulted    COVID-19 (Rule Out) 01/25/22 01/25/22 01/25/22 COVID-19 & Influenza Combo (Ordered)   01/25/22 Rule-Out Test Resulted    COVID-19 (Rule Out) 01/24/22 01/24/22 01/24/22 COVID-19, Rapid (Ordered)   01/24/22 Rule-Out Test Resulted    COVID-19 (Rule Out) 06/18/21 06/18/21 06/18/21 COVID-19 & Influenza Combo (Ordered)   06/18/21 Rule-Out Test Resulted            Nurse Assessment:  Last Vital Signs: /80   Pulse 74   Temp 97.8 °F (36.6 °C) (Oral)   Resp 18   Ht 5' 3\" (1.6 m)   Wt 153 lb 11.2 oz (69.7 kg)   SpO2 94%   BMI 27.23 kg/m²     Last documented pain score (0-10 scale): Pain Level: 6  Last Weight:   Wt Readings from Last 1 Encounters:   05/10/22 153 lb 11.2 oz (69.7 kg)     Mental Status:  {IP PT MENTAL STATUS:20030}    IV Access:  {[facility] IV ACCESS:150674371}    Nursing Mobility/ADLs:  Walking   {[facility] PWOP:676207608}  Transfer  {[facility] SKRN:104828875}  Bathing  {[facility] CTJM:037949702}  Dressing  {[facility] DME LXWR:154676490}  Toileting  {[facility] DME NNBA:596633910}  Feeding  {[facility] DME TIBY:939578668}  Med Admin  {[facility] DME AFDE:831934242}  Med Delivery   { CAROLANN MED Delivery:000035089}    Wound Care Documentation and Therapy:        Elimination:  Continence: Bowel: {YES / AB:40477}  Bladder: {YES / MR:65229}  Urinary Catheter: {Urinary Catheter:958585844}   Colostomy/Ileostomy/Ileal Conduit: {YES / CV:65292}       Date of Last BM: ***    Intake/Output Summary (Last 24 hours) at 5/10/2022 1528  Last data filed at 5/10/2022 1246  Gross per 24 hour   Intake 522 ml   Output 625 ml   Net -103 ml     I/O last 3 completed shifts: In: 200 [P.O.:420;  I.V.:10]  Out: 475 [Urine:475]    Safety Concerns:     [employer] Safety Concerns:190406056}    Impairments/Disabilities:      {[facility] Impairments/Disabilities:258313509}    Nutrition Therapy:  Current Nutrition Therapy:   [employer] Diet List:791745221}    Routes of Feeding: {[facility] DME Other Feedings:873604543}  Liquids: {Slp liquid thickness:63509}  Daily Fluid Restriction: {P DME Yes amt example:924289292}  Last Modified Barium Swallow with Video (Video Swallowing Test): {Done Not Done ZPCW:948165875}    Treatments at the Time of Hospital Discharge:   Respiratory Treatments: ***  Oxygen Therapy:  {Therapy; copd oxygen:15685}  Ventilator:    { CC Vent MEUF:556049795}    Rehab Therapies: Physical Therapy, Occupational Therapy, and SW/SN  Weight Bearing Status/Restrictions: [address] [employer] Weight [employer]in}  Other Medical Equipment (for information only, NOT a DME order):  {EQUIPMENT:628415625}  Other Treatments: ***    Patient's personal belongings (please select all that are sent with patient):  {[facility] DME Belongings:951855468}    RN SIGNATURE:  {Esignature:327262942}    CASE MANAGEMENT/SOCIAL WORK SECTION    Inpatient Status Date: 2022    Readmission Risk Assessment Score:  Readmission Risk              Risk of Unplanned Readmission:  29 Discharging to Facility/ Agency   Name: Marsha Garcia  Address:  Phone: [phone]  Fax: [phone]    :    / signature: {Esignature:604178754}    PHYSICIAN SECTION    Prognosis: {Prognosis:5569312066}    Condition at Discharge: Irma García Patient Condition:994223384}    Rehab Potential (if transferring to Rehab): {Prognosis:6487737803}    Recommended Labs or Other Treatments After Discharge: ***    Physician Certification: I certify the above information and transfer of Loyda Dailey  is necessary for the continuing treatment of the diagnosis listed and that she requires {Admit to Appropriate Level of Care:22875} for {GREATER/LESS:456048829} 30 days.      Update Admission H&P: {[facility] DME Changes in NERST:057485701}    PHYSICIAN SIGNATURE:  {Esignature:465990514}

## 2022-05-10 NOTE — ADT AUTH CERT
Utilization Reviews         Chronic Obstructive Pulmonary Disease - Care Day 4 (5/9/2022) by Edmar Vee RN       Review Status Review Entered   Completed 5/10/2022 16:38      Criteria Review      Care Day: 4 Care Date: 5/9/2022 Level of Care: Intermediate Care    Guideline Day 3    Clinical Status    (X) * Hemodynamic stability    5/10/2022 4:38 PM EDT by Tracy Shelton      Bp 151/91, 148/77  P 81    (X) * Mental status at baseline    5/10/2022 4:38 PM EDT by Tracy Clark      aaox4    ( ) * No evidence of infection, or outpatient treatment planned    5/10/2022 4:38 PM EDT by Lorena Clark      still on PO abx  wbc 14.4    (X) * Uncompensated acidosis absent    5/10/2022 4:38 PM EDT by Tracy Clark      none noted    ( ) * Oxygenation at baseline or acceptable for next level of care    5/10/2022 4:38 PM EDT by Tracy Clark      96 % 4lpm hfnc    ( ) * Signs and symptoms of COPD (eg, dyspnea, Tachypnea, cough, sputum production) at baseline or acceptable for next level of care    5/10/2022 4:38 PM EDT by Tracy Clark      + crackles    ( ) * Discharge plans and education understood    Activity    (X) * Ambulatory or acceptable for next level of care    5/10/2022 4:38 PM EDT by Tracy Clark      walks by himself    Routes    (X) * Oral hydration    5/10/2022 4:38 PM EDT by Tracy Shelton      intake 350 ml    (X) * Oral medications or regimen acceptable for next level of care    5/10/2022 4:38 PM EDT by Tracy Clark      therapeutic multivitamin-minerals 1 tablet PO Daily  sertraline 200 mg PO Daily  pseudoephedrine 120 mg PO BID x 1  predniSONE 10 mg PO Daily  potassium chloride 40 mEq PO Daily  pantoprazole 40 mg PO QAM AC  mirtazapine 30 mg PO Nightly  nadolol 20 mg PO.     (X) * Oral diet or acceptable for next level of care    5/10/2022 4:38 PM EDT by Tracy Clark      adult reg    Interventions    (X) Airflow measurements    5/10/2022 4:38 PM EDT by Tracy Clark      4lpm [facility]    Medications    (X) * Inhaled bronchodilator regimen established and feasible at next level of care    5/10/2022 4:38 PM EDT by Tracy Clark      ipratropium-albuterol 1 ampule In BID    (X) Oral steroids    5/10/2022 4:38 PM EDT by Tracy Paulson      predniSONE 10 mg PO Daily    (X) Possible oral antibiotics    5/10/2022 4:38 PM EDT by Tracy Paulson      levoFLOXacin 500 mg PO Daily    * Milestone   Additional Notes   DATE: 5/9/22      --------------------------------------------------------------------------------------   Pertinent Updates:   Sob remains stable. Pt on 4L today   c/o pain 7/10 left lower back and rib cage area - ultram 50 mg PO q6h PRN x 3   acetaminophen 650 mg PO q6h PRN x 2 given      --------------------------------------------------------------------------------------   Abnl/Pertinent Labs/Radiology/Diagnostic Studies:   Wbc 14.4   Hgb 11.4   Mcv 77   Mch 24.3   Rdw 18.6   Neutro 8.4   Eosino 1.2      Glucose 119   Cl 94   Poc glucose 121, 164, 124, 108      --------------------------------------------------------------------------------------   Physical Exam:   Resp: No accessory muscle use. + crackles in both bases,  No wheezes. No rhonchi.   --------------------------------------------------------------------------------------   Vitals:   Bp 151/91, 148/77   T 97.2 F   P 81   R 20   96 % 4lpm hfnc    Pain 7/10      -------------------------------------------------------------------------------------   MD Consults/Assessments & Plans:   >IM   Improving w/ diuresis and IV abx   Pt to start ESBRIET soon      >Pulmo   Cont pred      >cardio CNP   Plan:    1. Reviewed echo results with patient, normal LVEF/wall motion   2. Continue po lasix 40 mg daily   3. Sodium and fluid restriction   4. Cardiology will sign off, please call with questions. Follow up will be arranged. --------------------------------------------------------------------------------------   Medications:   therapeutic multivitamin-minerals 1 tablet PO Daily   sertraline 200 mg PO Daily   pseudoephedrine 120 mg PO BID x 1   predniSONE 10 mg PO Daily   potassium chloride 40 mEq PO Daily   pantoprazole 40 mg PO QAM AC   mirtazapine 30 mg PO Nightly   nadolol 20 mg PO Daily   lidocaine 4% 1 patch TD daily   levothyroxine 125 mcg PO Daily   levoFLOXacin 500 mg PO Daily   ipratropium-albuterol 1 ampule In BID   insulin lispro 0-6 Units SC TID WC x 1   insulin lispro 0-3 Units SC Nightly not given   guaiFENesin 1,200 mg PO QAM   furosemide 40 mg IV Daily   fluticasone 2 spray Nasal Daily   enoxaparin 40 mg SC Daily   cetirizine 10 mg PO Daily   atorvastatin 40 mg PO Daily      ultram 50 mg PO q6h PRN x 3   glycolax 17 g PO daily PRN x 1   lorazepam 0.5 mg PO q6h PRN x 1   acetaminophen 650 mg PO q6h PRN x 2      --------------------------------------------------------------------------------------   PT/OT/SLP/CM Assessments or Notes:   >RT   Bronchodilator Assessment Score: 2   0-3 - enter or revise RT bronchodilator order(s) to equivalent RT Bronchodilator order with Frequency of every 4 hours PRN for wheezing or increased work of breathing using Per Protocol order mode. >CM   Expected D/C Disposition:  Home   Discharge Plan Comments: Chart reviewed. Plan continues for pt to return home with spouse. Following for possible increased home O2 needs. Currently on 4L.       [employer] for home O2 at 3L baseline. CM called [employer] [place]. pt has 2L continuous O2 order with a 5L concentrator.    --------------------------------------------------------------------------------------        Chronic Obstructive Pulmonary Disease - Care Day 3 (5/8/2022) by Ihsan Shah RN       Review Status Review Entered   Completed 5/10/2022 16:34      Criteria Review      Care Day: 3 Care Date: 5/8/2022 Level of Care: Intermediate Care Guideline Day 3    Clinical Status    (X) * Hemodynamic stability    5/10/2022 4:31 PM EDT by Tracy Clark      /60   Pulse 81    (X) * Mental status at baseline    5/10/2022 4:31 PM EDT by Tracy Clark      aaox4    ( ) * No evidence of infection, or outpatient treatment planned    5/10/2022 4:31 PM EDT by Tracy Clrak      wbc 14.2  still on levofloxacin PO    (X) * Uncompensated acidosis absent    5/10/2022 4:31 PM EDT by Tracy Clark      none noted    ( ) * Oxygenation at baseline or acceptable for next level of care    5/10/2022 4:31 PM EDT by Tracy Clark      SpO2 96% 7lpm hfnc    ( ) * Signs and symptoms of COPD (eg, dyspnea, Tachypnea, cough, sputum production) at baseline or acceptable for next level of care    5/10/2022 4:31 PM EDT by Tracy Clark      + crackles    ( ) * Discharge plans and education understood    Activity    (X) * Ambulatory or acceptable for next level of care    5/10/2022 4:31 PM EDT by Tracy Clark      ambulating by self    Routes    (X) * Oral hydration    5/10/2022 4:31 PM EDT by Tracy Dale      intake 350ml    (X) * Oral medications or regimen acceptable for next level of care    5/10/2022 4:31 PM EDT by Tracy Clark      therapeutic multivitamin-minerals 1 tablet PO Daily  sertraline 200 mg PO Daily  pseudoephedrine 120 mg PO BID x 1  predniSONE 10 mg PO Daily  potassium chloride 40 mEq PO Daily  pantoprazole 40 mg PO QAM AC  mirtazapine 30 mg PO Nightly  nadolol 20 mg PO. .    (X) * Oral diet or acceptable for next level of care    5/10/2022 4:31 PM EDT by Tracy Clark      adult reg    Interventions    (X) Airflow measurements    5/10/2022 4:31 PM EDT by Tracy Clark      4lpm hfnc    Medications    (X) * Inhaled bronchodilator regimen established and feasible at next level of care    5/10/2022 4:31 PM EDT by Tracy Clark      Bronchodilator Assessment Score: (P) 6  4-6 - enter or revise RT Bronchodilator order(s) to two equivalent RT bronchodilator orders with one order with BID Frequency and one order with Frequency of every 4 hours PRN wheezing    (X) Oral steroids    5/10/2022 4:31 PM EDT by Tracy Luciano      predniSONE 10 mg PO Daily    (X) Possible oral antibiotics    5/10/2022 4:31 PM EDT by Tracy Clark      levoFLOXacin 500 mg PO Daily    * Milestone   Additional Notes   DATE: 5/8/22                                                                                                 --------------------------------------------------------------------------------------   Pertinent Updates:   c/o of nausea - ondansetron 4 mg PO q8h PRN x 1 given   lower back pain 7/10- ultram 50 mg PO q6h PRN x 3 given   Patient also states she is having some anxiety, PRN lorazepam 0.5 mg PO q6h x 1 given      --------------------------------------------------------------------------------------   Abnl/Pertinent Labs/Radiology/Diagnostic Studies:   Poc glucose 162, 139, 165, 114   Cl 95   Co2 33   Glucose 100      Wbc 14.2   Hgb 11   Hct 35.8   Mcv 77   Mch 23.8   Mchc 30.8   Rdw 18.6   Neutro 8.9      Culture, Blood 2: no growth   Culture, Blood routine: no growth   --------------------------------------------------------------------------------------   Physical Exam:   Resp: No accessory muscle use. + crackles. No wheezes. No rhonchi.      --------------------------------------------------------------------------------------   Vitals:    /60   Pulse 81   Temp 97.5 °F (36.4 °C) (Oral)   Resp 18)   SpO2 96% 7lpm hfnc  pain 7/10      -------------------------------------------------------------------------------------   MD Consults/Assessments & Plans:   >electrophysio   05/08/2022   Patient breathing has improved.  Creatinine slightly worsened.  I believe it is time to transition to oral diuretics. - Stop IV furosemide.   - Start PO furosemide at same dose.  Likely discharge dose.    - HF education.   - Follow up with cardiology as outpatient after discharge. - We will continue to follow along with you. Leukocytosis. - Per primary team.      >IM   Assessment/Plan:   Acute on chronic hypoxic respiratory failure   - baseline 3 L O2   - currently on 5 L- per pulmonology okay to d/c   - CXR with multifocal interstitial alveolar opacities - PNA vs pulm edema, superimposed on known fibrosis.        Acute on chronic Diastolic CHF   - cardiology consulted   - daily weights; I&O's   - net negative 4.8 L   - transitioned to PO lasix   - repeat Echo Monday   - Needs cardiology follow up as outpatient. Can possibly d/c tomorrow.       ILD/COPD    Pneumonia, possible gram negative organism, risk for MRSA   - pulmonology consult   - continue Mucinex. Was on Merrem/Vanc.  De-escalated antibiotics to Levaquin.    - levaquin D#3, prednisone 10 mg daily until f/u OP with pulmonologist.    Mason Duncan       DM type 2   - monitor glucose. SSI coverage       Hypothyroidism   - home dose of synthroid 125 mcg        Hyperlipidemia   - on statin       Anxiety   - PRN Ativan       Anemia, microcytic/hypochromic   - checked iron studies   - H/H improved today.        Hypertension    - BP stable. Continue Nadolol.        Leukocytosis    - possibly related to steroids.  Up to 14.2   - continue Levaquin, prednisone   - monitor CBC       DVT Prophylaxis: Lovenox   Diet: ADULT DIET;  Regular; 4 carb choices (60 gm/meal)   Code Status: Full Code      >Pulmo   PLAN:   Supplemental oxygen to maintain SaO2 >92%; wean as tolerated    Lasix per cardiology   Prednisone to 10mg daily and maintain until next visit   Negro Ortiz has been approved but not started yet   No h/o MDRO - deescalated to oral levaquin      --------------------------------------------------------------------------------------   Medications:   therapeutic multivitamin-minerals 1 tablet PO Daily   sertraline 200 mg PO Daily   pseudoephedrine 120 mg PO BID x 1 predniSONE 10 mg PO Daily   potassium chloride 40 mEq PO Daily   pantoprazole 40 mg PO QAM AC   mirtazapine 30 mg PO Nightly   nadolol 20 mg PO Daily   lidocaine 4% 1 patch TD daily   levothyroxine 125 mcg PO Daily   levoFLOXacin 500 mg PO Daily   ipratropium-albuterol 1 ampule In BID   insulin lispro 0-6 Units SC TID WC x 1   insulin lispro 0-3 Units SC Nightly not given   guaiFENesin 1,200 mg PO QAM   furosemide 40 mg IV Daily   fluticasone 2 spray Nasal Daily   enoxaparin 40 mg SC Daily   cetirizine 10 mg PO Daily   atorvastatin 40 mg PO Daily      ultram 50 mg PO q6h PRN x 3   lorazepam 0.5 mg PO q6h PRN x 1   ondansetron 4 mg PO q8h PRN x 1      --------------------------------------------------------------------------------------   PT/OT/SLP/CM Assessments or Notes:   >RT   Bronchodilator Assessment Score: (P) 6   4-6 - enter or revise RT Bronchodilator order(s) to two equivalent RT bronchodilator orders with one order with BID Frequency and one order with Frequency of every 4 hours PRN wheezing or increased work of breathing using Per Protocol order mode.     --------------------------------------------------------------------------------------

## 2022-05-10 NOTE — PROGRESS NOTES
RT Inhaler-Nebulizer Bronchodilator Protocol Note    There is a bronchodilator order in the chart from a provider indicating to follow the RT Bronchodilator Protocol and there is an Initiate RT Inhaler-Nebulizer Bronchodilator Protocol order as well (see protocol at bottom of note). CXR Findings:  No results found. The findings from the last RT Protocol Assessment were as follows:   History Pulmonary Disease: Chronic pulmonary disease  Respiratory Pattern: Regular pattern and RR 12-20 bpm  Breath Sounds: Clear breath sounds  Cough: Strong, spontaneous, non-productive  Indication for Bronchodilator Therapy: On home bronchodilators  Bronchodilator Assessment Score: 2    Aerosolized bronchodilator medication orders have been revised according to the RT Inhaler-Nebulizer Bronchodilator Protocol below. Respiratory Therapist to perform RT Therapy Protocol Assessment initially then follow the protocol. Repeat RT Therapy Protocol Assessment PRN for score 0-3 or on second treatment, BID, and PRN for scores above 3. No Indications - adjust the frequency to every 6 hours PRN wheezing or bronchospasm, if no treatments needed after 48 hours then discontinue using Per Protocol order mode. If indication present, adjust the RT bronchodilator orders based on the Bronchodilator Assessment Score as indicated below. Use Inhaler orders unless patient has one or more of the following: on home nebulizer, not able to hold breath for 10 seconds, is not alert and oriented, cannot activate and use MDI correctly, or respiratory rate 25 breaths per minute or more, then use the equivalent nebulizer order(s) with same Frequency and PRN reasons based on the score. If a patient is on this medication at home then do not decrease Frequency below that used at home.     0-3 - enter or revise RT bronchodilator order(s) to equivalent RT Bronchodilator order with Frequency of every 4 hours PRN for wheezing or increased work of

## 2022-05-10 NOTE — PROGRESS NOTES
PRN Pain medication given for lower back pain, requested by patient, Tylenol not as effective per patient. Patient denies any further needs.

## 2022-05-10 NOTE — TELEPHONE ENCOUNTER
Called Elizabeth they will f/u with [facility] [place] [facility] today to check status. Patient needs to call PA#[phone] op. 1 to provide copay assistance info id# [id number] etc will need icome info also.

## 2022-05-10 NOTE — PROGRESS NOTES
Pt assisted to BSC x 1 assist, pt A&O x 4, VSS. Shift assessment complete and all meds given per MAR. Pt reported pain 7/10 in lower left back, pain medication given. Beverage and ice provided, snack denied. Bed in lowest position, call light within reach with all personal belongings. Pt denies any further needs.     Vitals:    05/09/22 2000   BP: (!) 147/92   Pulse: 69   Resp: 16   Temp: 96.7 °F (35.9 °C)   SpO2: 100%

## 2022-05-10 NOTE — PROGRESS NOTES
RT Inhaler-Nebulizer Bronchodilator Protocol Note    There is a bronchodilator order in the chart from a provider indicating to follow the RT Bronchodilator Protocol and there is an Initiate RT Inhaler-Nebulizer Bronchodilator Protocol order as well (see protocol at bottom of note). CXR Findings:  No results found. The findings from the last RT Protocol Assessment were as follows:   History Pulmonary Disease: (P) Chronic pulmonary disease  Respiratory Pattern: (P) Regular pattern and RR 12-20 bpm  Breath Sounds: (P) Clear breath sounds  Cough: (P) Strong, spontaneous, non-productive  Indication for Bronchodilator Therapy: (P) Decreased or absent breath sounds  Bronchodilator Assessment Score: (P) 2    Aerosolized bronchodilator medication orders have been revised according to the RT Inhaler-Nebulizer Bronchodilator Protocol below. Respiratory Therapist to perform RT Therapy Protocol Assessment initially then follow the protocol. Repeat RT Therapy Protocol Assessment PRN for score 0-3 or on second treatment, BID, and PRN for scores above 3. No Indications - adjust the frequency to every 6 hours PRN wheezing or bronchospasm, if no treatments needed after 48 hours then discontinue using Per Protocol order mode. If indication present, adjust the RT bronchodilator orders based on the Bronchodilator Assessment Score as indicated below. Use Inhaler orders unless patient has one or more of the following: on home nebulizer, not able to hold breath for 10 seconds, is not alert and oriented, cannot activate and use MDI correctly, or respiratory rate 25 breaths per minute or more, then use the equivalent nebulizer order(s) with same Frequency and PRN reasons based on the score. If a patient is on this medication at home then do not decrease Frequency below that used at home.     0-3 - enter or revise RT bronchodilator order(s) to equivalent RT Bronchodilator order with Frequency of every 4 hours PRN for wheezing or increased work of breathing using Per Protocol order mode. 4-6 - enter or revise RT Bronchodilator order(s) to two equivalent RT bronchodilator orders with one order with BID Frequency and one order with Frequency of every 4 hours PRN wheezing or increased work of breathing using Per Protocol order mode. 7-10 - enter or revise RT Bronchodilator order(s) to two equivalent RT bronchodilator orders with one order with TID Frequency and one order with Frequency of every 4 hours PRN wheezing or increased work of breathing using Per Protocol order mode. 11-13 - enter or revise RT Bronchodilator order(s) to one equivalent RT bronchodilator order with QID Frequency and an Albuterol order with Frequency of every 4 hours PRN wheezing or increased work of breathing using Per Protocol order mode. Greater than 13 - enter or revise RT Bronchodilator order(s) to one equivalent RT bronchodilator order with every 4 hours Frequency and an Albuterol order with Frequency of every 2 hours PRN wheezing or increased work of breathing using Per Protocol order mode.          Electronically signed by Pablito Cook RCP on 5/10/2022 at 8:32 AM

## 2022-05-10 NOTE — PLAN OF CARE
Problem: Discharge Planning  Goal: Discharge to home or other facility with appropriate resources  5/9/2022 1039 by Nolberto Lora RN  Outcome: Progressing     Problem: Safety - Adult  Goal: Free from fall injury  5/9/2022 1039 by Nolberto Lora RN  Outcome: Progressing     Problem: ABCDS Injury Assessment  Goal: Absence of physical injury  5/9/2022 1039 by Nolberto Lora RN  Outcome: Progressing     Problem: Skin/Tissue Integrity  Goal: Absence of new skin breakdown  Description: 1. Monitor for areas of redness and/or skin breakdown  2. Assess vascular access sites hourly  3. Every 4-6 hours minimum:  Change oxygen saturation probe site  4. Every 4-6 hours:  If on nasal continuous positive airway pressure, respiratory therapy assess nares and determine need for appliance change or resting period.   5/9/2022 1039 by Nolberto Lora RN  Outcome: Progressing     Problem: Pain  Goal: Verbalizes/displays adequate comfort level or baseline comfort level  5/9/2022 2358 by Blanca Young RN  Outcome: Progressing  5/9/2022 1039 by Nolberto Lora RN  Outcome: Progressing

## 2022-05-10 NOTE — PLAN OF CARE
Problem: Discharge Planning  Goal: Discharge to home or other facility with appropriate resources  Outcome: Progressing     Problem: Safety - Adult  Goal: Free from fall injury  Outcome: Progressing     Problem: ABCDS Injury Assessment  Goal: Absence of physical injury  Outcome: Progressing     Problem: Skin/Tissue Integrity  Goal: Absence of new skin breakdown  Description: 1. Monitor for areas of redness and/or skin breakdown  2. Assess vascular access sites hourly  3. Every 4-6 hours minimum:  Change oxygen saturation probe site  4. Every 4-6 hours:  If on nasal continuous positive airway pressure, respiratory therapy assess nares and determine need for appliance change or resting period.   Outcome: Progressing     Problem: Pain  Goal: Verbalizes/displays adequate comfort level or baseline comfort level  5/10/2022 1010 by Zenobia Carrion RN  Outcome: Progressing  5/9/2022 4421 by Kayla Campbell RN  Outcome: Progressing     Problem: Chronic Conditions and Co-morbidities  Goal: Patient's chronic conditions and co-morbidity symptoms are monitored and maintained or improved  Outcome: Progressing

## 2022-05-11 LAB
ANION GAP SERPL CALCULATED.3IONS-SCNC: 12 MMOL/L (ref 3–16)
BLOOD CULTURE, ROUTINE: NORMAL
BUN BLDV-MCNC: 14 MG/DL (ref 7–20)
CALCIUM SERPL-MCNC: 9.8 MG/DL (ref 8.3–10.6)
CHLORIDE BLD-SCNC: 89 MMOL/L (ref 99–110)
CO2: 31 MMOL/L (ref 21–32)
CREAT SERPL-MCNC: 0.6 MG/DL (ref 0.6–1.2)
CULTURE, BLOOD 2: NORMAL
GFR AFRICAN AMERICAN: >60
GFR NON-AFRICAN AMERICAN: >60
GLUCOSE BLD-MCNC: 112 MG/DL (ref 70–99)
GLUCOSE BLD-MCNC: 124 MG/DL (ref 70–99)
GLUCOSE BLD-MCNC: 133 MG/DL (ref 70–99)
GLUCOSE BLD-MCNC: 171 MG/DL (ref 70–99)
GLUCOSE BLD-MCNC: 172 MG/DL (ref 70–99)
PERFORMED ON: ABNORMAL
POTASSIUM SERPL-SCNC: 4.5 MMOL/L (ref 3.5–5.1)
SODIUM BLD-SCNC: 132 MMOL/L (ref 136–145)

## 2022-05-11 PROCEDURE — 6370000000 HC RX 637 (ALT 250 FOR IP): Performed by: INTERNAL MEDICINE

## 2022-05-11 PROCEDURE — 6370000000 HC RX 637 (ALT 250 FOR IP)

## 2022-05-11 PROCEDURE — 2700000000 HC OXYGEN THERAPY PER DAY

## 2022-05-11 PROCEDURE — 2580000003 HC RX 258

## 2022-05-11 PROCEDURE — 94761 N-INVAS EAR/PLS OXIMETRY MLT: CPT

## 2022-05-11 PROCEDURE — 99232 SBSQ HOSP IP/OBS MODERATE 35: CPT | Performed by: INTERNAL MEDICINE

## 2022-05-11 PROCEDURE — 6360000002 HC RX W HCPCS

## 2022-05-11 PROCEDURE — 36415 COLL VENOUS BLD VENIPUNCTURE: CPT

## 2022-05-11 PROCEDURE — 6370000000 HC RX 637 (ALT 250 FOR IP): Performed by: NURSE PRACTITIONER

## 2022-05-11 PROCEDURE — 80048 BASIC METABOLIC PNL TOTAL CA: CPT

## 2022-05-11 PROCEDURE — 2060000000 HC ICU INTERMEDIATE R&B

## 2022-05-11 PROCEDURE — 97535 SELF CARE MNGMENT TRAINING: CPT

## 2022-05-11 PROCEDURE — 6370000000 HC RX 637 (ALT 250 FOR IP): Performed by: HOSPITALIST

## 2022-05-11 RX ORDER — BISACODYL 10 MG
10 SUPPOSITORY, RECTAL RECTAL DAILY PRN
Status: DISCONTINUED | OUTPATIENT
Start: 2022-05-11 | End: 2022-05-12 | Stop reason: HOSPADM

## 2022-05-11 RX ORDER — LACTULOSE 10 G/15ML
20 SOLUTION ORAL ONCE
Status: COMPLETED | OUTPATIENT
Start: 2022-05-11 | End: 2022-05-11

## 2022-05-11 RX ADMIN — TRAMADOL HYDROCHLORIDE 50 MG: 50 TABLET, COATED ORAL at 06:11

## 2022-05-11 RX ADMIN — FUROSEMIDE 40 MG: 40 TABLET ORAL at 09:13

## 2022-05-11 RX ADMIN — PSEUDOEPHEDRINE HCL 120 MG: 120 TABLET, FILM COATED, EXTENDED RELEASE ORAL at 09:20

## 2022-05-11 RX ADMIN — GUAIFENESIN 1200 MG: 600 TABLET, EXTENDED RELEASE ORAL at 09:13

## 2022-05-11 RX ADMIN — POLYETHYLENE GLYCOL (3350) 17 G: 17 POWDER, FOR SOLUTION ORAL at 11:13

## 2022-05-11 RX ADMIN — MULTIPLE VITAMINS W/ MINERALS TAB 1 TABLET: TAB at 09:13

## 2022-05-11 RX ADMIN — ENOXAPARIN SODIUM 40 MG: 100 INJECTION SUBCUTANEOUS at 09:13

## 2022-05-11 RX ADMIN — PANTOPRAZOLE SODIUM 40 MG: 40 TABLET, DELAYED RELEASE ORAL at 06:11

## 2022-05-11 RX ADMIN — LORAZEPAM 0.5 MG: 0.5 TABLET ORAL at 11:01

## 2022-05-11 RX ADMIN — SERTRALINE 200 MG: 100 TABLET, FILM COATED ORAL at 09:13

## 2022-05-11 RX ADMIN — LACTULOSE 20 G: 10 SOLUTION ORAL at 15:19

## 2022-05-11 RX ADMIN — Medication 10 ML: at 09:14

## 2022-05-11 RX ADMIN — LEVOTHYROXINE SODIUM 125 MCG: 125 TABLET ORAL at 06:11

## 2022-05-11 RX ADMIN — DOCUSATE SODIUM 100 MG: 100 CAPSULE, LIQUID FILLED ORAL at 09:13

## 2022-05-11 RX ADMIN — FLUTICASONE PROPIONATE 2 SPRAY: 50 SPRAY, METERED NASAL at 09:19

## 2022-05-11 RX ADMIN — LEVOFLOXACIN 500 MG: 500 TABLET, FILM COATED ORAL at 09:13

## 2022-05-11 RX ADMIN — MIRTAZAPINE 15 MG: 15 TABLET, FILM COATED ORAL at 22:13

## 2022-05-11 RX ADMIN — Medication 10 ML: at 22:13

## 2022-05-11 RX ADMIN — ONDANSETRON 4 MG: 4 TABLET, ORALLY DISINTEGRATING ORAL at 06:11

## 2022-05-11 RX ADMIN — POTASSIUM CHLORIDE 40 MEQ: 1500 TABLET, EXTENDED RELEASE ORAL at 09:13

## 2022-05-11 RX ADMIN — NADOLOL 20 MG: 40 TABLET ORAL at 09:19

## 2022-05-11 RX ADMIN — BISACODYL 10 MG: 10 SUPPOSITORY RECTAL at 11:26

## 2022-05-11 RX ADMIN — ATORVASTATIN CALCIUM 40 MG: 40 TABLET, FILM COATED ORAL at 09:13

## 2022-05-11 RX ADMIN — FERROUS SULFATE TAB 325 MG (65 MG ELEMENTAL FE) 325 MG: 325 (65 FE) TAB at 09:19

## 2022-05-11 RX ADMIN — PREDNISONE 10 MG: 10 TABLET ORAL at 09:13

## 2022-05-11 RX ADMIN — TRAMADOL HYDROCHLORIDE 50 MG: 50 TABLET, COATED ORAL at 22:22

## 2022-05-11 RX ADMIN — DOCUSATE SODIUM 100 MG: 100 CAPSULE, LIQUID FILLED ORAL at 22:13

## 2022-05-11 ASSESSMENT — PAIN DESCRIPTION - DESCRIPTORS: DESCRIPTORS: ACHING

## 2022-05-11 ASSESSMENT — PAIN DESCRIPTION - ORIENTATION
ORIENTATION: LEFT;LOWER
ORIENTATION: LEFT;LOWER

## 2022-05-11 ASSESSMENT — PAIN SCALES - GENERAL
PAINLEVEL_OUTOF10: 8
PAINLEVEL_OUTOF10: 4
PAINLEVEL_OUTOF10: 4

## 2022-05-11 ASSESSMENT — PAIN DESCRIPTION - LOCATION
LOCATION: BACK
LOCATION: BACK

## 2022-05-11 NOTE — PROGRESS NOTES
Pulmonary Progress Note  CC: Shortness of breath    Subjective:    Appears comfortable  4 L O2      EXAM: BP (!) 140/82   Pulse 74   Temp 97.8 °F (36.6 °C) (Oral)   Resp 20   Ht 5' 3\" (1.6 m)   Wt 154 lb 11.2 oz (70.2 kg)   SpO2 98%   BMI 27.40 kg/m²  on 4L  Constitutional:  No acute distress. Varun Ra HEENT: no scleral icterus  Neck: No tracheal deviation present. Cardiovascular: Normal heart sounds. Pulmonary/Chest: No wheezes. No rhonchi. Few rales. No decreased breath sounds. No accessory muscle usage or stridor. Abdominal: Soft. Musculoskeletal: No cyanosis. No clubbing. Skin: Skin is warm and dry.      Scheduled Meds:   pseudoephedrine  120 mg Oral Daily    docusate sodium  100 mg Oral BID    ferrous sulfate  325 mg Oral Daily with breakfast    mirtazapine  15 mg Oral Nightly    furosemide  40 mg Oral Daily    lidocaine  1 patch TransDERmal Daily    potassium chloride  40 mEq Oral Daily    insulin lispro  0-6 Units SubCUTAneous TID WC    insulin lispro  0-3 Units SubCUTAneous Nightly    predniSONE  10 mg Oral Daily    levoFLOXacin  500 mg Oral Daily    atorvastatin  40 mg Oral Daily    fluticasone  2 spray Nasal Daily    guaiFENesin  1,200 mg Oral QAM    levothyroxine  125 mcg Oral Daily    therapeutic multivitamin-minerals  1 tablet Oral Daily    nadolol  20 mg Oral Daily    pantoprazole  40 mg Oral QAM AC    sertraline  200 mg Oral Daily    sodium chloride flush  5-40 mL IntraVENous 2 times per day    enoxaparin  40 mg SubCUTAneous Daily     Continuous Infusions:   dextrose      sodium chloride       PRN Meds:  ipratropium-albuterol, traMADol, albuterol, benzonatate, LORazepam, glucagon (rDNA), dextrose, sodium chloride flush, sodium chloride, ondansetron **OR** ondansetron, polyethylene glycol, acetaminophen **OR** acetaminophen, perflutren lipid microspheres, dextrose bolus **OR** dextrose bolus, glucose    Labs:  CBC:   Recent Labs     05/09/22  0508 05/10/22  9615 WBC 14.4* 12.4*   HGB 11.3* 11.3*   HCT 36.0 36.3   MCV 77.0* 76.8*    279     BMP:   Recent Labs     05/09/22  0508 05/10/22  0508 05/11/22  0453    134* 132*   K 3.7 4.2 4.5   CL 94* 92* 89*   CO2 31 31 31   BUN 18 13 14   CREATININE 0.7 0.6 0.6          CT chest 5/6  No pulmonary embolus is identified. Interstitial fibrosis with ground-glass opacities.  The ground-glass   opacities are stable since 04/18/2022 and moderately progressed since   02/08/2022.    Stable mediastinal adenopathy        Echocardiogram 5/9/2022  Technically difficult examination. Apical views are off axis secondary to pt   left ribs fracture. Left ventricular systolic function is normal with ejection fraction   estimated at 55%. No regional wall motion abnormalities. There is mild concentric left ventricular hypertrophy. Indeterminate left ventricular filling pressure. Systolic pulmonary artery pressure (SPAP) is normal estimated at 22 mmHg   (Right atrial pressure of 8 mmHg).       ASSESSMENT:  · Acute on chronic hypoxemic respiratory failure  · Elevated BNP  · ILD with progressive fibrosis; ; CT was stable form previous last month     PLAN:  · Supplemental oxygen to maintain SaO2 >92%; wean as tolerated   · Continue prednisone to 10 mg daily and maintain until next visit  · [facility] [place] has been approved but not started yet  · Diuresis per cardiology  · No h/o MDRO - deescalated to oral levaquin day 4/5  · DC plan is okay with pulmonary  · Discussed with internal medicine

## 2022-05-11 NOTE — PLAN OF CARE
Problem: Discharge Planning  Goal: Discharge to home or other facility with appropriate resources  5/10/2022 2351 by Carlos Garcia RN  Outcome: Progressing  5/10/2022 2351 by Carlos Garcia RN  Outcome: Progressing  5/10/2022 2350 by Carlos Garcia RN  Outcome: Progressing  Flowsheets (Taken 5/10/2022 2038)  Discharge to home or other facility with appropriate resources: Identify barriers to discharge with patient and caregiver  5/10/2022 1010 by Mohit Mercado RN  Outcome: Progressing     Problem: Safety - Adult  Goal: Free from fall injury  5/10/2022 2351 by Carlos Garcia RN  Outcome: Progressing  5/10/2022 2351 by Carlos Garcia RN  Outcome: Progressing  5/10/2022 2350 by Carlos Garcia RN  Outcome: Progressing  5/10/2022 1010 by Mohit Mercado RN  Outcome: Progressing     Problem: ABCDS Injury Assessment  Goal: Absence of physical injury  5/10/2022 2351 by Carlos Garcia RN  Outcome: Progressing  5/10/2022 2351 by Carlos Garcia RN  Outcome: Progressing  5/10/2022 2350 by Carlos Garcia RN  Outcome: Progressing  5/10/2022 1010 by Mohit Mercado RN  Outcome: Progressing     Problem: Skin/Tissue Integrity  Goal: Absence of new skin breakdown  Description: 1. Monitor for areas of redness and/or skin breakdown  2. Assess vascular access sites hourly  3. Every 4-6 hours minimum:  Change oxygen saturation probe site  4. Every 4-6 hours:  If on nasal continuous positive airway pressure, respiratory therapy assess nares and determine need for appliance change or resting period.   5/10/2022 2351 by Carlos Garcia RN  Outcome: Progressing  5/10/2022 2351 by Carlos Garcia RN  Outcome: Progressing  5/10/2022 2350 by Carlos Garcia RN  Outcome: Progressing  5/10/2022 1010 by Mohit Mercado RN  Outcome: Progressing     Problem: Pain  Goal: Verbalizes/displays adequate comfort level or baseline comfort level  5/10/2022 2351 by Carlos Garcia RN  Outcome: Progressing  5/10/2022 2351 by Carlos Garcia RN  Outcome: Progressing  5/10/2022 2350 by Carlos Garcia RN  Outcome: Progressing  5/10/2022 1010 by Mohit Mercado RN  Outcome: Progressing     Problem: Chronic Conditions and Co-morbidities  Goal: Patient's chronic conditions and co-morbidity symptoms are monitored and maintained or improved  5/10/2022 2351 by Carlos Garcia RN  Outcome: Progressing  5/10/2022 2351 by Carlos Gacria RN  Outcome: Progressing  5/10/2022 2350 by Carlos Garcia RN  Outcome: Progressing  Flowsheets (Taken 5/10/2022 2038)  Care Plan - Patient's Chronic Conditions and Co-Morbidity Symptoms are Monitored and Maintained or Improved: Monitor and assess patient's chronic conditions and comorbid symptoms for stability, deterioration, or improvement  5/10/2022 1010 by Mohit Mercado RN  Outcome: Progressing

## 2022-05-11 NOTE — PROGRESS NOTES
Progress Note    Admit Date:  5/6/2022    69 y.o. female Hx of  (crypt org PNA), Afib, chronic hypox Resp failure 3l/min at baseline, who presents with worsening severe dyspnea. Pt reports had to use up to 5l/min on her home concentrator and still o2 at 81% and worsening dyspnea. Admitted for CHF, ILD, and anemia         Subjective:  Ms. Jareth Angeles seen sitting up in bed  . Sob remains stable, reports severe constipation and no BM since arrival     pt on 4 L today     Objective:   Patient Vitals for the past 4 hrs:   BP Temp Temp src Pulse Resp SpO2   05/11/22 0749 (!) 140/82 97.8 °F (36.6 °C) Oral 74 -- 98 %   05/11/22 0641 -- -- -- -- 20 --            Intake/Output Summary (Last 24 hours) at 5/11/2022 0756  Last data filed at 5/11/2022 0455  Gross per 24 hour   Intake 882 ml   Output 1275 ml   Net -393 ml       Physical Exam:    Gen: elderly female up in bed, No distress. Alert. Eyes: PERRL. No sclera icterus. No conjunctival injection. ENT: No discharge. Pharynx clear. Neck:  Trachea midline. Resp: No accessory muscle use. + crackles in both bases,  No wheezes. No rhonchi. CV: Regular rate. Regular rhythm. No murmur. No rub. No edema. Capillary Refill: Brisk,< 3 seconds   Peripheral Pulses: +2 palpable, equal bilaterally   GI: Non-tender. Non-distended. Normal bowel sounds. No hernia. Skin: Warm and dry. No nodule on exposed extremities. No rash on exposed extremities. M/S: No cyanosis. No joint deformity. No clubbing. Neuro: Awake. Grossly nonfocal    Psych: Oriented x 3.  No anxiety or agitation    Data:  CBC:   Recent Labs     05/09/22  0508 05/10/22  0508   WBC 14.4* 12.4*   HGB 11.3* 11.3*   HCT 36.0 36.3   MCV 77.0* 76.8*    279     BMP:   Recent Labs     05/09/22  0508 05/10/22  0508 05/11/22 0453    134* 132*   K 3.7 4.2 4.5   CL 94* 92* 89*   CO2 31 31 31   BUN 18 13 14   CREATININE 0.7 0.6 0.6     LIVER PROFILE:     CULTURES  Blood: NGTD  COVID: not detected  Influenza: negative    RADIOLOGY  CT CHEST PULMONARY EMBOLISM W CONTRAST   Final Result   No pulmonary embolus is identified. Interstitial fibrosis with ground-glass opacities. The ground-glass   opacities are stable since 04/18/2022 and moderately progressed since   02/08/2022. This may represent progressive interstitial disease, NSIP   pattern. Superimposed pneumonia/pneumonitis or pulmonary edema are also   possible. Stable mediastinal adenopathy. XR CHEST PORTABLE   Final Result   Multifocal interstitial-alveolar opacities, suspected pneumonia or pulmonary   edema superimposed on fibrosis. Assessment/Plan:    Acute on chronic hypoxic respiratory failure  - CXR with multifocal interstitial alveolar opacities - PNA vs pulm edema, superimposed on known fibrosis. Improving with diuresis and IV abx  - baseline 2-3 L O2  - currently on 4 L-    Acute on chronic Diastolic CHF  - ongoing diuresis with IV lasix  - daily weights; I&O's  - net negative 5 L  - transitioned to PO lasix  - repeat Echo Monday  - Needs cardiology follow up as outpatient. ILD/  Pneumonia, possible gram negative organism, risk for MRSA  - pulmonology consulted ,  - worsening ILD per imaging  - continue Mucinex. Was on Merrem/Vanc. De-escalated antibiotics to Levaquin.   - levaquin D#6, prednisone 10 mg daily until f/u OP with pulmonologist.   - Duonebs  - pt to start [address] soon    DM type 2  - monitor glucose. SSI coverage    Hypothyroidism  - home dose of synthroid 125 mcg     Hyperlipidemia  - on statin    Anxiety  - PRN Ativan    Anemia, microcytic/hypochromic  - checked iron studies- on iron supplement  - H/H improved today. Hypertension   - BP stable. Continue Nadolol. Leukocytosis   - possibly related to steroids. Up to 14.2 >12  - continue Levaquin, prednisone  - monitor CBC    DVT Prophylaxis: Lovenox  Diet: ADULT DIET;  Regular; 4 carb choices (60 gm/meal)  Code Status: Full Code    Add lactulose and suppository today   Dc planning to home with PT    Tommy Garcia MD, 5/11/2022 7:56 AM

## 2022-05-11 NOTE — PROGRESS NOTES
Occupational Therapy Daily Treatment Note    Unit: PCU  Date:  5/11/2022  Patient Name:    Lynn Mayanrd  Admitting diagnosis:  Shortness of breath [R06.02]  Acute on chronic respiratory failure with hypoxia (HCC) [J96.21]  Pneumonia of both lower lobes due to infectious organism [J18.9]  Admit Date:  5/6/2022  Precautions/Restrictions:  fall risk,IV, supplemental O2 (4L), telemetry and continuous pulse ox         Discharge Recommendations: Home with 24/7 assist and home therapy  DME needs for discharge: Needs Met       Therapy recommendations for staff:   Assist of 1 (contact guard assist) with use of rolling walker (RW) for all transfers to/from BSC/chair    AM-PAC Score:    Home Health S4 Level: Level 1- Standard       Treatment Time:  11:39-11:54  Treatment number:  2    Total Treatment Time:  15  minutes    History of Present Illness: : H & P as per Rd Cartagena MD's note dated 5/7/2022  The patient is X 43 y.o. female Hx of  (crypt org PNA), Afib, chronic hypox Resp failure 3l/min at baseline, who presents with worsening severe dyspnea. Pt reports had to use up to 5L/min on her home concentrator and still o2 at 81% and worsening dyspnea. Reports cough. Not much sputum. No orthopnea per say. No fever. Recently seen for fall and multi rib fractures - sent to  and has since returned home.    She feels a lot better today.      Subjective:  Pt on toilet with constipation and nausea     Pain   Yes  Rating: moderate  Location:rib pain, pt bent over sitting on BSC   Pain Medicine Status: Received pain med prior to tx      Bed Mobility:   Supine to Sit:  N/A  Sit to Supine:  N/A  Rolling:           N/A  Scooting:        N/A    Transfer Training:   Sit to stand:   CGA completed sit to stand 5x's to try to help with constipation   Stand to sit:  CGA  Bed to Chair:  N/A  Bed to [facility]:   N/A  Standard toilet:   N/A    Activity Tolerance   Pt completed therapy session with Nausea noted with movement Balance  Sitting Balance :     SBA  Standing Balance   CGA    ADL Training:   Upper body dressing:  N/A  Upper body bathing:  N/A  Lower body dressing:  N/A  Lower body bathing:  N/A  Toileting:   CGA  Grooming/Hygiene:  Supervision brushed teeth seated on BSC, pt stating it might make her feel better   Feeding :   N/A    Therapeutic Exercise:   N/A    Patient Education:   Role of OT    Positioning Needs:   Sitting on BSC     Family Present:  Yes,     Assessment: Pt would benefit from cont OT tx to work on Ind with ADL's, IADL's, transfers, endurance, balance, safety and overall strength to return home with . GOALS  1). Bed to toilet/BSC: SBA     To be met in 5 Visits:  1). Supine to/from Sit:             SBA  2). Upper Body Bathing:         SBA  3). Lower Body Bathing:         SBA  4). Upper Body Dressing:       SBA  5). Lower Body Dressing:       SBA  6).  Pt to demonstrate UE exs x 15 reps with minimal cues      Plan: cont with ANDREA Iglesias OTR/L #[id number]        If patient discharges from this facility prior to next visit, this note will serve as the Discharge Summary

## 2022-05-12 VITALS
TEMPERATURE: 97 F | HEART RATE: 84 BPM | DIASTOLIC BLOOD PRESSURE: 86 MMHG | HEIGHT: 63 IN | BODY MASS INDEX: 26.82 KG/M2 | OXYGEN SATURATION: 97 % | SYSTOLIC BLOOD PRESSURE: 134 MMHG | WEIGHT: 151.38 LBS | RESPIRATION RATE: 18 BRPM

## 2022-05-12 LAB
GLUCOSE BLD-MCNC: 122 MG/DL (ref 70–99)
PERFORMED ON: ABNORMAL

## 2022-05-12 PROCEDURE — 99238 HOSP IP/OBS DSCHRG MGMT 30/<: CPT | Performed by: INTERNAL MEDICINE

## 2022-05-12 PROCEDURE — 6370000000 HC RX 637 (ALT 250 FOR IP)

## 2022-05-12 PROCEDURE — 6370000000 HC RX 637 (ALT 250 FOR IP): Performed by: INTERNAL MEDICINE

## 2022-05-12 PROCEDURE — 6370000000 HC RX 637 (ALT 250 FOR IP): Performed by: HOSPITALIST

## 2022-05-12 PROCEDURE — 6370000000 HC RX 637 (ALT 250 FOR IP): Performed by: NURSE PRACTITIONER

## 2022-05-12 PROCEDURE — 2700000000 HC OXYGEN THERAPY PER DAY

## 2022-05-12 PROCEDURE — 94761 N-INVAS EAR/PLS OXIMETRY MLT: CPT

## 2022-05-12 PROCEDURE — 6360000002 HC RX W HCPCS

## 2022-05-12 RX ORDER — FERROUS SULFATE 325(65) MG
325 TABLET ORAL
Qty: 30 TABLET | Refills: 3 | Status: ON HOLD | OUTPATIENT
Start: 2022-05-12 | End: 2022-08-16

## 2022-05-12 RX ORDER — DOCUSATE SODIUM 100 MG/1
100 CAPSULE, LIQUID FILLED ORAL 2 TIMES DAILY
Qty: 20 CAPSULE | Refills: 0 | Status: SHIPPED | OUTPATIENT
Start: 2022-05-12 | End: 2022-07-06

## 2022-05-12 RX ADMIN — DOCUSATE SODIUM 100 MG: 100 CAPSULE, LIQUID FILLED ORAL at 08:48

## 2022-05-12 RX ADMIN — ATORVASTATIN CALCIUM 40 MG: 40 TABLET, FILM COATED ORAL at 08:47

## 2022-05-12 RX ADMIN — PSEUDOEPHEDRINE HCL 120 MG: 120 TABLET, FILM COATED, EXTENDED RELEASE ORAL at 08:49

## 2022-05-12 RX ADMIN — SERTRALINE 200 MG: 100 TABLET, FILM COATED ORAL at 08:47

## 2022-05-12 RX ADMIN — ACETAMINOPHEN 650 MG: 325 TABLET ORAL at 00:49

## 2022-05-12 RX ADMIN — PANTOPRAZOLE SODIUM 40 MG: 40 TABLET, DELAYED RELEASE ORAL at 05:49

## 2022-05-12 RX ADMIN — LEVOFLOXACIN 500 MG: 500 TABLET, FILM COATED ORAL at 08:47

## 2022-05-12 RX ADMIN — PREDNISONE 10 MG: 10 TABLET ORAL at 08:48

## 2022-05-12 RX ADMIN — FERROUS SULFATE TAB 325 MG (65 MG ELEMENTAL FE) 325 MG: 325 (65 FE) TAB at 08:47

## 2022-05-12 RX ADMIN — LEVOTHYROXINE SODIUM 125 MCG: 125 TABLET ORAL at 05:49

## 2022-05-12 RX ADMIN — GUAIFENESIN 1200 MG: 600 TABLET, EXTENDED RELEASE ORAL at 08:47

## 2022-05-12 RX ADMIN — NADOLOL 20 MG: 40 TABLET ORAL at 08:47

## 2022-05-12 RX ADMIN — MULTIPLE VITAMINS W/ MINERALS TAB 1 TABLET: TAB at 08:47

## 2022-05-12 RX ADMIN — POTASSIUM CHLORIDE 40 MEQ: 1500 TABLET, EXTENDED RELEASE ORAL at 08:47

## 2022-05-12 RX ADMIN — FUROSEMIDE 40 MG: 40 TABLET ORAL at 08:47

## 2022-05-12 RX ADMIN — TRAMADOL HYDROCHLORIDE 50 MG: 50 TABLET, COATED ORAL at 05:51

## 2022-05-12 RX ADMIN — FLUTICASONE PROPIONATE 2 SPRAY: 50 SPRAY, METERED NASAL at 08:49

## 2022-05-12 RX ADMIN — ENOXAPARIN SODIUM 40 MG: 100 INJECTION SUBCUTANEOUS at 08:47

## 2022-05-12 ASSESSMENT — PAIN SCALES - GENERAL
PAINLEVEL_OUTOF10: 5
PAINLEVEL_OUTOF10: 2

## 2022-05-12 ASSESSMENT — PAIN DESCRIPTION - DESCRIPTORS
DESCRIPTORS: POUNDING
DESCRIPTORS: ACHING

## 2022-05-12 ASSESSMENT — PAIN DESCRIPTION - ORIENTATION
ORIENTATION: MID
ORIENTATION: LEFT;LOWER

## 2022-05-12 ASSESSMENT — PAIN DESCRIPTION - LOCATION
LOCATION: HEAD
LOCATION: BACK

## 2022-05-12 NOTE — PLAN OF CARE
Problem: Discharge Planning  Goal: Discharge to home or other facility with appropriate resources  5/12/2022 0102 by Jacky Willard RN  Outcome: Progressing  5/12/2022 0102 by Jacky Willard RN  Outcome: Progressing  Flowsheets (Taken 5/11/2022 2224)  Discharge to home or other facility with appropriate resources: Identify barriers to discharge with patient and caregiver     Problem: Safety - Adult  Goal: Free from fall injury  5/12/2022 0102 by Jacky Willard RN  Outcome: Progressing  5/12/2022 0102 by Jacky Willard RN  Outcome: Progressing     Problem: ABCDS Injury Assessment  Goal: Absence of physical injury  5/12/2022 0102 by Jacky Willard RN  Outcome: Progressing  5/12/2022 0102 by aJcky Willard RN  Outcome: Progressing  Flowsheets (Taken 5/11/2022 1445 by Crispin Hines RN)  Absence of Physical Injury: Implement safety measures based on patient assessment     Problem: Skin/Tissue Integrity  Goal: Absence of new skin breakdown  Description: 1. Monitor for areas of redness and/or skin breakdown  2. Assess vascular access sites hourly  3. Every 4-6 hours minimum:  Change oxygen saturation probe site  4. Every 4-6 hours:  If on nasal continuous positive airway pressure, respiratory therapy assess nares and determine need for appliance change or resting period.   5/12/2022 0102 by Jacky Willard RN  Outcome: Progressing  5/12/2022 0102 by Jacky Willard RN  Outcome: Progressing     Problem: Pain  Goal: Verbalizes/displays adequate comfort level or baseline comfort level  5/12/2022 0102 by Jacky Willard RN  Outcome: Progressing  5/12/2022 0102 by Jacky Willard RN  Outcome: Progressing     Problem: Chronic Conditions and Co-morbidities  Goal: Patient's chronic conditions and co-morbidity symptoms are monitored and maintained or improved  5/12/2022 0102 by Jacky Willard RN  Outcome: Progressing  5/12/2022 0102 by Jacky Willard RN  Outcome: Progressing  Flowsheets (Taken 5/11/2022 2224)  Care Plan - Patient's Chronic Conditions and Co-Morbidity Symptoms are Monitored and Maintained or Improved: Monitor and assess patient's chronic conditions and comorbid symptoms for stability, deterioration, or improvement

## 2022-05-12 NOTE — FLOWSHEET NOTE
05/06/22 1900   Vital Signs   Temp 98.2 °F (36.8 °C)   Temp Source Oral   Pulse 83   Heart Rate Source Monitor   Resp 18   /68   BP Location Right upper arm   MAP (Calculated) 86   Patient Position Semi fowlers; Lying left side   Level of Consciousness Alert (0)   MEWS Score 1   Oxygen Therapy   SpO2 98 %   O2 Device High flow nasal cannula   O2 Flow Rate (L/min) 3 L/min   Pt assessment complete. Pt lying in bed quietly. Lung sounds diminished with expiratory wheezes. Pt SR per monitor with HR of 83. Pt get sob with excertion. RT in to given breathing treatment. Ox turned up to 8 liters. Cisneros cath in place draining clear yellow urine. Sputum sample collected. Nightly medications given. Pt NPO for cardiology consult. Denies needs. Call light within reach. Bed exit alarm on.
05/07/22 0834   Vital Signs   Temp 97.8 °F (36.6 °C)   Temp Source Oral   Resp 24   /69   BP Location Right upper arm   MAP (Calculated) 84   Level of Consciousness Alert (0)   Oxygen Therapy   SpO2 96 %   O2 Device High flow nasal cannula   O2 Flow Rate (L/min) 5 L/min     Patient resting quietly in bed. No s/s of distress noted. Shift assessment complete, see flow sheet. Denies needs at this time. Call light in reach. Will monitor.
05/07/22 1952   Vital Signs   Temp 98.1 °F (36.7 °C)   Temp Source Oral   Pulse 78   Heart Rate Source Monitor   Resp 16   /61   BP Location Right upper arm   MAP (Calculated) 78   Level of Consciousness Alert (0)   MEWS Score 1   Oxygen Therapy   SpO2 95 %   O2 Device High flow nasal cannula   O2 Flow Rate (L/min) 7 L/min   Pt assessment complete. Pt lying in bed quietly. Pt alert and oriented x 4. Pt NSR per monitor with HR of 78. Lung sounds diminished. Pt on 02 7liters via high flow nasal cannula. Nightly medications given. PRN tylenol given for rib pain. RT in to give breathing treatment. Denies needs. Call light within reach. Bed exit alarm on.
05/08/22 0849   Vital Signs   Temp 97.5 °F (36.4 °C)   Temp Source Oral   Pulse 81   Resp 18   /60   BP Location Right upper arm   MAP (Calculated) 79.33   Level of Consciousness Alert (0)   MEWS Score 1   Oxygen Therapy   SpO2 96 %   O2 Device High flow nasal cannula   O2 Flow Rate (L/min) 5 L/min     Patient sitting up in chair. No s/s of distress noted. Shift assessment complete, see flow sheet. Denies needs at this time. Call light in reach. Will monitor.
05/09/22 0845   Vital Signs   Temp 96.9 °F (36.1 °C)   Temp Source Oral   Pulse 81   Heart Rate Source Monitor   Resp 19   BP (!) 151/91   BP Location Right upper arm   MAP (Calculated) 111   Level of Consciousness Alert (0)   MEWS Score 1   Pain Assessment   Pain Assessment 0-10   Opioid-Induced Sedation   POSS Score 1   RASS   Kunz Agitation Sedation Scale (RASS) 0   Oxygen Therapy   SpO2 97 %   O2 Flow Rate (L/min) 4 L/min     Alert and oriented x4. Skin w/d. Resp e/e unlabored. HHN tx per respiratory. Oxygen at 4L per n/c. Lidocaine patch applied to Left lower back. Prn tramadol given for pain left lower back and rib area. Pain 7/10. Nursing asmt completed. No s/s distress noted. Bed alarm on. Call light in easy reach.
05/10/22 0900   Vital Signs   Temp 97 °F (36.1 °C)   Temp Source Oral   Pulse 85   Heart Rate Source Monitor   Resp 18   BP (!) 136/90   BP Location Right upper arm   MAP (Calculated) 105.33   Patient Position Sitting   Level of Consciousness Alert (0)   MEWS Score 1   Pain Assessment   Pain Assessment 0-10   Pain Level 8   Oxygen Therapy   SpO2 99 %   O2 Device High flow nasal cannula   O2 Flow Rate (L/min) 4 L/min     Pt assessment completed, vss, see flowsheet. Pt alert and oriented x 4. Pt c/o 8/10 back pain , medicated with Tramadol x1. Pt denies any other needs at this time.  Aury Black RN
05/10/22 2012   Vital Signs   Temp 97.5 °F (36.4 °C)   Temp Source Oral   Pulse 71   Heart Rate Source Monitor   Resp 20   /79   BP Location Right upper arm   MAP (Calculated) 93   Patient Position Lying left side;Semi fowlers   Level of Consciousness Alert (0)   MEWS Score 1   Oxygen Therapy   SpO2 97 %   O2 Device High flow nasal cannula   O2 Flow Rate (L/min) 4 L/min     Shift assessment complete, see flowsheets. Medications given, see MAR. Pt A&Ox4. VSS. Pt denies SOB and pain. Bilateral breath sounds diminished. NSR on the monitor. Heel protectors replaced. Sacral mepilex refused. Pt reports nose bleed, educated to apply pressure with tissue. Drink provided. Bed locked in lowest position. Call light and bedside table within reach. Will continue to monitor.
05/11/22 0115   Vital Signs   Temp 97.4 °F (36.3 °C)   Temp Source Oral   Pulse 71   Heart Rate Source Monitor   Resp 20   BP (!) 147/69   BP Location Right upper arm   MAP (Calculated) 95   Patient Position Lying left side   Level of Consciousness Alert (0)   MEWS Score 1   Oxygen Therapy   SpO2 99 %   O2 Device High flow nasal cannula   O2 Flow Rate (L/min) 4 L/min     Pt resting in bed asleep. VSS. No s/s of distress. Pt denies SOB at this time. Drink provided. Repositioned for comfort. Bed locked in lowest position. Call light and bedside table within reach. Will continue to monitor.
05/11/22 0749   Vital Signs   Temp 97.8 °F (36.6 °C)   Temp Source Oral   Pulse 74   Heart Rate Source Monitor   Resp 18   BP (!) 140/82   MAP (Calculated) 101.33   Level of Consciousness Alert (0)   MEWS Score 1   Pain Assessment   Pain Assessment 0-10   Pain Level 4   Pain Location Back   Oxygen Therapy   SpO2 98 %   Assessment complete and morning medications administered
05/11/22 2215   Vital Signs   Temp 97.2 °F (36.2 °C)   Temp Source Oral   Pulse 80   Heart Rate Source Monitor   Resp 18   BP (!) 141/79   BP Location Right upper arm   MAP (Calculated) 99.67   Patient Position Lying left side   Level of Consciousness Alert (0)   MEWS Score 1   Pain Assessment   Pain Assessment 0-10   Pain Level 4   Patient's Stated Pain Goal 0 - No pain   Pain Location Back   Pain Orientation Left; Lower   Pain Descriptors Aching   Opioid-Induced Sedation   POSS Score 1   Oxygen Therapy   SpO2 91 %   O2 Device Nasal cannula   O2 Flow Rate (L/min) 4 L/min     Shift assessment complete, see flowsheets. Medications given, see MAR. Pt A&Ox4. VSS. No s/s of distress. Pt complaining of back pain, PRN given. Bilateral breath sounds diminished. NSR on the monitor. Bowel sounds active. Large BM noted on 5/11. Pt bathed and linens changed. Bed locked in lowest position. Call light and bedside table within reach. Will continue to monitor.
05/12/22 0034   Vital Signs   Temp 96.7 °F (35.9 °C)   Temp Source Oral   Pulse 87   Heart Rate Source Monitor   Resp 18   BP (!) 139/95   BP Location Right upper arm   MAP (Calculated) 109.67   Patient Position Sitting   Level of Consciousness Alert (0)   MEWS Score 1   Oxygen Therapy   SpO2 100 %   O2 Device Nasal cannula   O2 Flow Rate (L/min) 3 L/min     Pt resting in bed awake. VSS. No s/s of distress. Pt requesting tylenol for \"a dull headache\". PRN given. Ice chips provided. Pt up to bedside commode. Bed locked in lowest position. Call light and bedside table within reach. Will continue to monitor.
05/12/22 0830   Vital Signs   Temp 97 °F (36.1 °C)   Temp Source Oral   Pulse 84   Heart Rate Source Monitor   Resp 18   /86   BP Location Right upper arm   MAP (Calculated) 102   Patient Position Sitting   Level of Consciousness Alert (0)   MEWS Score 1   Oxygen Therapy   SpO2 97 %   Pulse Oximeter Device Mode Intermittent   Pulse Oximeter Device Location Finger   O2 Device Nasal cannula   O2 Flow Rate (L/min) 3 L/min     Shift assessment complete - see flow sheet, pt denies needs, call light within reach.
Pt talking about her 22y/0 daughter who passed. Pt counting her blessing for her  who is very supportive of her. Offered listening presence and prayer offered at the end of visit.
Yes

## 2022-05-12 NOTE — PROGRESS NOTES
O2 Sat at rest with oxygen @  3 lpm is 98 %. O2 Sat with activity with oxygen @ 3 lpm is 97%. 3L oxygen is patient baseline oxygen. Pt states her concentrator goes up to 5L.

## 2022-05-12 NOTE — PROGRESS NOTES
Progress Note    Admit Date:  5/6/2022    69 y.o. female Hx of  ([facility] PNA), Afib, chronic hypox Resp failure 3l/min at baseline, who presents with worsening severe dyspnea. Pt reports had to use up to 5l/min on her home concentrator and still o2 at 81% and worsening dyspnea. Admitted for CHF, ILD, and anemia         Subjective:  Ms. Florence Bowie seen sitting up in bed  . Sob remains stable, reports having good BM with lactulose and it made a huge difference   Feels good today   Ready to go home    pt on 3 L today     Objective:   Patient Vitals for the past 4 hrs:   Resp   05/12/22 0621 18            Intake/Output Summary (Last 24 hours) at 5/12/2022 0757  Last data filed at 5/12/2022 8551  Gross per 24 hour   Intake 540 ml   Output 75 ml   Net 465 ml       Physical Exam:    Gen: elderly female up in bed, No distress. Alert. Eyes: PERRL. No sclera icterus. No conjunctival injection. ENT: No discharge. Pharynx clear. Neck:  Trachea midline. Resp: No accessory muscle use. + crackles in both bases,  No wheezes. No rhonchi. CV: Regular rate. Regular rhythm. No murmur. No rub. No edema. Capillary Refill: Brisk,< 3 seconds   Peripheral Pulses: +2 palpable, equal bilaterally   GI: Non-tender. Non-distended. Normal bowel sounds. No hernia. Skin: Warm and dry. No nodule on exposed extremities. No rash on exposed extremities. M/S: No cyanosis. No joint deformity. No clubbing. Neuro: Awake. Grossly nonfocal    Psych: Oriented x 3. No anxiety or agitation    Data:  CBC:   Recent Labs     05/10/22  0508   WBC 12.4*   HGB 11.3*   HCT 36.3   MCV 76.8*        BMP:   Recent Labs     05/10/22  0508 05/11/22  0453   * 132*   K 4.2 4.5   CL 92* 89*   CO2 31 31   BUN 13 14   CREATININE 0.6 0.6     LIVER PROFILE:     CULTURES  Blood: NGTD  COVID: not detected  Influenza: negative    RADIOLOGY  CT CHEST PULMONARY EMBOLISM W CONTRAST   Final Result   No pulmonary embolus is identified. Interstitial fibrosis with ground-glass opacities. The ground-glass   opacities are stable since 04/18/2022 and moderately progressed since   02/08/2022. This may represent progressive interstitial disease, NSIP   pattern. Superimposed pneumonia/pneumonitis or pulmonary edema are also   possible. Stable mediastinal adenopathy. XR CHEST PORTABLE   Final Result   Multifocal interstitial-alveolar opacities, suspected pneumonia or pulmonary   edema superimposed on fibrosis. Assessment/Plan:    Acute on chronic hypoxic respiratory failure  - CXR with multifocal interstitial alveolar opacities - PNA vs pulm edema, superimposed on known fibrosis. Improving with diuresis and IV abx  - baseline 2-3 L O2  - currently on 3 L-    Acute on chronic Diastolic CHF  - ongoing diuresis with IV lasix  - daily weights; I&O's  - net negative 5 L  - transitioned to PO lasix  - repeat Echo with stable EF at 55 %, no RWMA, SPAP at 22  - Needs cardiology follow up as outpatient. ILD/  Pneumonia, possible gram negative organism, risk for MRSA  - pulmonology consulted ,  - worsening ILD per imaging  - continue Mucinex. Was on Merrem/Vanc. De-escalated antibiotics to Levaquin.   - levaquin D#7/7 , chronic  prednisone 10 mg daily until f/u OP with pulmonologist.   - Duonebs  - pt to start [address] soon    DM type 2  - monitor glucose. SSI coverage    Hypothyroidism  - home dose of synthroid 125 mcg     Hyperlipidemia  - on statin    Anxiety  - PRN Ativan    Anemia, microcytic/hypochromic  - checked iron studies- on iron supplement  - H/H improved today. Hypertension   - BP stable. Continue Nadolol. Leukocytosis   - possibly related to steroids. Up to 14.2 >12  - continue Levaquin, prednisone  - monitor CBC    DVT Prophylaxis: Lovenox  Diet: ADULT DIET;  Regular; 4 carb choices (60 gm/meal)  Code Status: Full Code    Dc home    Carolina Penn MD, 5/12/2022 7:57 AM

## 2022-05-12 NOTE — PROGRESS NOTES
Patient educated on discharge instructions as well as new medications use, dosage, administration and possible side effects. Patient verified knowledge. IV removed without difficulty and dry dressing in place. Telemetry monitor removed and returned to [address]. Patient refused the OTC medications with Meds to beds and states she will pick them up on the way home. Pt left facility in stable condition to home  with all of their personal belongings.

## 2022-05-12 NOTE — CARE COORDINATION
DISCHARGE ORDER  Date/Time 2022 10:17 AM  Completed by: Brent Conner RN, Case Management    Patient Name: Sada Cruz      : 1955  Admitting Diagnosis: Shortness of breath [R06.02]  Acute on chronic respiratory failure with hypoxia ([facility]) [J96.21]  Pneumonia of both lower lobes due to infectious organism [J18.9]      Admit order Date and Status:2022  (verify MD's last order for status of admission)      Noted discharge order. If applicable PT/OT recommendation at Discharge: Home with 24/7 assist and home therapy  DME recommendation by PT/OT:n/a  Confirmed discharge plan  (pt): Yes  with whom_____________pt__  If pt confirmed DC plan does family need to be contacted by CM No if yes who______n/a  Discharge Plan: Reviewed chart. Role of discharge planner explained and patient verbalized understanding. Pt is alert and oriented. Discharge order is noted. Pt is being d/c'd to home today. Pt's O2 sats are 97% on 3L. Pt is active with [facility] for home O2 and has a 5L concentrator at  Home. Pt's order at [facility] states 2L . Leah BARTON is going to perform a walk test for pt and if pt needs more that 2 L, then the new walk test and new O2 order will be faxed to [facility] at ([phone]). CM called [facility] and spoke with Lorrie Ruelas ([phone]) and she confirmed the above fax number and that all CM needed to do was fax this over for the increase  And change in O2 order. Pt is aware that PT/OT recommend home with 24 hr assist and home PT/OT. Pt states se has 24/7 assist with spouse and family and pt is wanting HC and wanted [facility]. Cm called Nelda Ponce with [facility]. Nelda Ponce with [facility] [place]. is aware of pt discharging to home today and can accept pt for PT/OT/SN/SW. +HC orders, +eCOC. Nelda Ponce will pull the info from [facility]. No further discharge needs needed or noted. Addendum 1126: walk test from Leah RN states \"O2 Sat at rest with oxygen @  3 lpm is 98 %.   O2 Sat with activity with oxygen @ 3 lpm is 97%.     3L oxygen is patient baseline oxygen. Pt states her concentrator goes up to 5L\". This was faxed with new home O2 orders to [facility] to [phone]. Pt and her spouse states that Dr. Terry Corado increased pt to 3L while she was in his office at the last visit. Reviewed chart. Role of discharge planner explained and patient verbalized understanding. Discharge order is noted. Has Home O2 in place on admit:  Yes  Informed of need to bring portable home O2 tank on day of discharge for nursing to connect prior to leaving:   Yes  Verbalized agreement/Understanding:   Yes    Discharge timeout done with MICK Lynn. All discharge needs and concerns addressed.

## 2022-05-13 NOTE — TELEPHONE ENCOUNTER
Pt calling stating that [facility] pharmacy called and needs RX sent to them instead as [facility] is not in network.

## 2022-05-13 NOTE — TELEPHONE ENCOUNTER
Called [facility] rx is ready to ship. Patient needs to call [phone] to ok shipment. Lm for patient informed.

## 2022-05-15 DIAGNOSIS — E11.9 TYPE 2 DIABETES MELLITUS WITHOUT COMPLICATION, WITHOUT LONG-TERM CURRENT USE OF INSULIN (HCC): ICD-10-CM

## 2022-05-16 RX ORDER — SITAGLIPTIN 100 MG/1
TABLET, FILM COATED ORAL
Qty: 90 TABLET | Refills: 1 | Status: SHIPPED | OUTPATIENT
Start: 2022-05-16 | End: 2022-05-27 | Stop reason: SDUPTHER

## 2022-05-16 NOTE — TELEPHONE ENCOUNTER
Patient is due for an appointment, but is requesting to do a virtual appointment at this time due to transportation issues and inability to sit for prolonged periods of time in a car at this time.  Please advise

## 2022-05-19 ENCOUNTER — TELEPHONE (OUTPATIENT)
Dept: PULMONOLOGY | Age: 67
End: 2022-05-19

## 2022-05-19 NOTE — DISCHARGE SUMMARY
Name:  Kiana   Room:  /4126-59  MRN:    [id number]    Discharge Summary      This discharge summary is in conjunction with a complete physical exam done on the day of discharge. Discharging Physician: Dr. Kenisha Oden MD     Admit: 5/6/2022  Discharge:  5/12/2022    HPI taken from admission H&P:    The patient is a 69 y.o. female Hx of  (crypt org PNA), Afib, chronic hypox Resp failure 3l/min at baseline, who presents with worsening severe dyspnea. Pt reports had to use up to 5l/min on her home concentrator and still o2 at 81% and worsening dyspnea.      Reports cough. Not much sputum. No orthopnea per say. No fever.      Recently seen for fall and multi rib fractures - sent to  and has since returned home.         She feels a lot better today. Diagnoses this Admission and Hospital Course   Acute on chronic hypoxic respiratory failure  - CXR with multifocal interstitial alveolar opacities - PNA vs pulm edema, superimposed on known fibrosis. Improving with diuresis and IV abx  - baseline 2-3 L O2  - currently on 3 L-     Acute on chronic Diastolic CHF  - ongoing diuresis with IV lasix  - daily weights; I&O's  - net negative 5 L  - transitioned to PO lasix  - repeat Echo with stable EF at 55 %, no RWMA, SPAP at 22  - Needs cardiology follow up as outpatient.     ILD/  Pneumonia, possible gram negative organism, risk for MRSA  - pulmonology consulted ,  - worsening ILD per imaging  - continue Mucinex. Was on Merrem/Vanc. De-escalated antibiotics to Levaquin.   - levaquin D#7/7 , chronic  prednisone 10 mg daily until f/u OP with pulmonologist.   - Cristopher  - pt to start [id number] soon     DM type 2  - monitor glucose. SSI coverage     Hypothyroidism  - home dose of synthroid 125 mcg      Hyperlipidemia  - on statin     Anxiety  - PRN Ativan     Anemia, microcytic/hypochromic  - checked iron studies- on iron supplement  - H/H improved today.      Hypertension   - BP stable.  Continue Nadolol.      Leukocytosis   - possibly related to steroids. Up to 14.2 >12  - continue Levaquin, prednisone  - monitor CBC    Procedures (Please Review Full Report for Details)  None    Consults    Cardiology  Pulmonology    Physical Exam at Discharge:    /86   Pulse 84   Temp 97 °F (36.1 °C) (Oral)   Resp 18   Ht 5' 3\" (1.6 m)   Wt 151 lb 6 oz (68.7 kg)   SpO2 97%   BMI 26.81 kg/m²     Gen: elderly female up in bed, No distress. Alert. Eyes: PERRL. No sclera icterus. No conjunctival injection. ENT: No discharge. Pharynx clear. Neck:  Trachea midline. Resp: No accessory muscle use. + crackles in both bases,  No wheezes. No rhonchi. CV: Regular rate. Regular rhythm. No murmur. No rub. No edema. Capillary Refill: Brisk,< 3 seconds   Peripheral Pulses: +2 palpable, equal bilaterally   GI: Non-tender. Non-distended. Normal bowel sounds. No hernia. Skin: Warm and dry. No nodule on exposed extremities. No rash on exposed extremities. M/S: No cyanosis. No joint deformity. No clubbing. Neuro: Awake. Grossly nonfocal    Psych: Oriented x 3. No anxiety or agitation     Data:  CBC:       Recent Labs     05/10/22  0508   WBC 12.4*   HGB 11.3*   HCT 36.3   MCV 76.8*         BMP:        Recent Labs     05/10/22  0508 05/11/22  0453   * 132*   K 4.2 4.5   CL 92* 89*   CO2 31 31   BUN 13 14   CREATININE 0.6 0.6      LIVER PROFILE:      CULTURES  Blood: NGTD  COVID: not detected  Influenza: negative     RADIOLOGY  CT CHEST PULMONARY EMBOLISM W CONTRAST   Final Result   No pulmonary embolus is identified.       Interstitial fibrosis with ground-glass opacities. The ground-glass   opacities are stable since 04/18/2022 and moderately progressed since   02/08/2022. This may represent progressive interstitial disease, NSIP   pattern.   Superimposed pneumonia/pneumonitis or pulmonary edema are also   possible.       Stable mediastinal adenopathy.           XR CHEST PORTABLE   Final Result Multifocal interstitial-alveolar opacities, suspected pneumonia or pulmonary   edema superimposed on fibrosis.             Discharge Medications     Medication List      START taking these medications    docusate sodium 100 MG capsule  Commonly known as: COLACE  Take 1 capsule by mouth 2 times daily     ferrous sulfate 325 (65 Fe) MG tablet  Commonly known as: IRON 325  Take 1 tablet by mouth daily (with breakfast)  Notes to patient: Ferrous Sulfate  Use: treats anemia    Side effects: belly pain, upset stomach, dark stools,     or constipation. predniSONE 10 MG tablet  Commonly known as: DELTASONE  Take 1 tablet by mouth daily  Notes to patient: Prednisone  Use: treat asthma and COPD     Side effects: upset stomach, high blood sugars, weight     gain, mood changes, and increased chances of infection        CHANGE how you take these medications    furosemide 40 MG tablet  Commonly known as: LASIX  Take 1 tablet by mouth daily  What changed:   · when to take this  · reasons to take this     hydrOXYzine 10 MG tablet  Commonly known as: ATARAX  Take 1 tablet by mouth every 8 hours as needed for Itching TAKE 1 TO 3 TABLETS BY MOUTH 3 TIMES DAILY AS NEEDED FOR ITCHING  What changed:   · how much to take  · how to take this  · when to take this  · reasons to take this        CONTINUE taking these medications    acetaminophen 325 MG tablet  Commonly known as: TYLENOL     * albuterol (2.5 MG/3ML) 0.083% nebulizer solution  Commonly known as: PROVENTIL  INHALE THE CONTENTS OF 1 VIAL VIA NEBULIZER EVERY 6 HOURS AS NEEDED FOR WHEEZING     * albuterol sulfate  (90 Base) MCG/ACT inhaler  INHALE 2 PUFFS INTO THE LUNGS EVERY 6 HOURS AS NEEDED FOR WHEEZING     atorvastatin 40 MG tablet  Commonly known as: LIPITOR  Take 1 tablet by mouth daily  Notes to patient: Atorvastatin (Lipitor®)  Use: lower bad cholesterol   Side effects: headache, muscle pains, constipation, diarrhea.      benzonatate 200 MG capsule  Commonly known as: TESSALON  TAKE 1 CAPSULE BY MOUTH 3 TIMES A DAY AS NEEDED FOR COUGH     blood glucose test strips strip  Commonly known as: ASCENSIA AUTODISC VI;ONE TOUCH ULTRA TEST VI  1 each by In Vitro route daily As needed. [employer] Allergy Relief-D12 5-120 MG per extended release tablet  Generic drug: loratadine-pseudoephedrine  TAKE 1 TABLET BY MOUTH TWICE A DAY     empagliflozin 25 MG tablet  Commonly known as: JARDIANCE  Take 1 tablet by mouth daily     fluticasone 50 MCG/ACT nasal spray  Commonly known as: Flonase  2 sprays by Nasal route daily     guaiFENesin 600 MG extended release tablet  Commonly known as: MUCINEX     INSULIN SYRINGE 1CC/29G 29G X 1/2\" 1 ML Misc  1 each by Does not apply route 2 times daily     levothyroxine 125 MCG tablet  Commonly known as: SYNTHROID  Take 1 tablet by mouth Daily TAKE 1 TABLET BY MOUTH EVERY DAY     LORazepam 0.5 MG tablet  Commonly known as: ATIVAN     mirtazapine 30 MG tablet  Commonly known as: REMERON  Take 1 tablet by mouth nightly     MULTIVITAMIN ADULT PO     nadolol 20 MG tablet  Commonly known as: CORGARD  Take 1 tablet by mouth daily     naloxone 4 MG/0.1ML Liqd nasal spray     OXYGEN     pantoprazole 40 MG tablet  Commonly known as: PROTONIX  Take 1 tablet by mouth every morning (before breakfast)     potassium chloride 10 MEQ extended release tablet  Commonly known as: KLOR-CON     sertraline 100 MG tablet  Commonly known as: ZOLOFT  Take 2 tablets by mouth daily     sodium chloride 0.65 % nasal spray  Commonly known as: OCEAN, BABY AYR     True Metrix Level 1 Low Soln  use as needed     True Metrix Meter Lindsay  Test daily and as needed     TRUEplus Lancets 33G Misc  Use daily         * This list has 2 medication(s) that are the same as other medications prescribed for you. Read the directions carefully, and ask your doctor or other care provider to review them with you.             STOP taking these medications    SITagliptin 100 MG tablet  Commonly known as: Christian Carrera your doctor about these medications    traMADol 50 MG tablet  Commonly known as: Ultram  Take 1-2 tablets by mouth every 6 hours as needed for Pain for up to 180 days. Intended supply: 7 days. Take lowest dose possible to manage pain  Ask about: Should I take this medication? Where to Get Your Medications      These medications were sent to [facility]/pharmacy #[id number]- [place], [place] - [address] [phone] - [phone]  [address] [address]    Phone: [phone]   · furosemide 40 MG tablet  · hydrOXYzine 10 MG tablet  · predniSONE 10 MG tablet     These medications were sent to [address] [facility], [address] [place] [address], [address] [place] [address]    Phone: [phone]   · docusate sodium 100 MG capsule  · ferrous sulfate 325 (65 Fe) MG tablet           Discharged in stable condition to home    Follow Up:   Follow up with PCP in 1 week     Dakota Robles MD, 6/9/2022 3:51 PM

## 2022-05-19 NOTE — TELEPHONE ENCOUNTER
Order for [address] shipped 5/16/22. Still need repeat LFT 1 mo after starting? Next appt 8/3/22 with Dr Sheri Jacob.

## 2022-05-19 NOTE — TELEPHONE ENCOUNTER
Patient calling in stating that she has been taking Esbriet for 2 days and has been experiencing extreme nausea she is asking if something could be sent in for the sxs    LOV: 5/3/22    ASSESSMENT:  · Based on the biopsies, I favor a h/o of cryptogenic organizing pneumonia and possibly coexisting NSIP or chronic HP. · After a period of stability she now seems to have the phenotype of Progressive fibrosing lung disease  · Chronic hypoxic respiratory failure -resolved  · Cough and Fever had been intermittently associated with her condition  · Restrictive lung disease  · Chronic Rhinitis with PND   · Obesity  · Afib     PLAN:   · Pt still trying to get assistance with esbriet. Unable to afford ofev. · Check LFT  · Continue Mucinex  · Start Acapella  · PRN tessalon pearles  · In the past she completed Prednisone 40mg for 4-6 weeks, 30mg for 1 month, 20mg for 1 month, 15mg for 6 weeks, 10mg for 6 weeks, 5mg for 8 weeks, 2.5mg daily for 2 weeks and  2.5mg QOD for 4 weeks. Stopped Prednisone 5/2020  · Multiple shorter steroid tapers and most recently completed Prednisone 30mg daily for 1 week, 10mg for 1 week,  5mg for 1 week  · Continue sinus regimen: flonase and Claritin   · The pt is on PRN Lasix  · PRN albuterol and albuterol nebs  · Mold remediated  · If the pt is unable to start Ofev or Esbriet and has another flare then I will plan a prednisone taper and then keep her on 10 mg of prednisone chronically and then consider a steroid sparing agent such as CellCept or Imuran. · I informed the patient and her family that I will be transitioning out of office practice. The patient is agreeable to her care being assumed by another physician in the office.  I also let the patient know she may need to be referred to an ILD subspecialist in the future.

## 2022-05-19 NOTE — TELEPHONE ENCOUNTER
Patient informed will wait for Dr Cool. Wants to continue for a few more days to see if nausea clears.

## 2022-05-20 RX ORDER — ONDANSETRON 4 MG/1
4 TABLET, FILM COATED ORAL 3 TIMES DAILY PRN
Qty: 30 TABLET | Refills: 0 | Status: SHIPPED | OUTPATIENT
Start: 2022-05-20 | End: 2022-07-04 | Stop reason: SDUPTHER

## 2022-05-20 NOTE — TELEPHONE ENCOUNTER
Stop for three days. Then start perfinidone 267mg PO TID for 7 days, then 534mg PO daily x 7 days and then 801mg TID thereafter. Take with food.     Also Rx for zofran 4mg TID PRN nausea

## 2022-05-21 DIAGNOSIS — M54.16 LUMBAR RADICULOPATHY: ICD-10-CM

## 2022-05-23 RX ORDER — TRAMADOL HYDROCHLORIDE 50 MG/1
TABLET ORAL
Qty: 120 TABLET | OUTPATIENT
Start: 2022-05-23

## 2022-05-25 DIAGNOSIS — J06.9 VIRAL URI: ICD-10-CM

## 2022-05-26 RX ORDER — FLUTICASONE PROPIONATE 50 MCG
SPRAY, SUSPENSION (ML) NASAL
Qty: 1 EACH | Refills: 1 | Status: SHIPPED | OUTPATIENT
Start: 2022-05-26 | End: 2022-07-04 | Stop reason: SDUPTHER

## 2022-05-27 ENCOUNTER — TELEPHONE (OUTPATIENT)
Dept: FAMILY MEDICINE CLINIC | Age: 67
End: 2022-05-27

## 2022-05-27 ENCOUNTER — OFFICE VISIT (OUTPATIENT)
Dept: FAMILY MEDICINE CLINIC | Age: 67
End: 2022-05-27
Payer: MEDICARE

## 2022-05-27 VITALS
BODY MASS INDEX: 27.04 KG/M2 | SYSTOLIC BLOOD PRESSURE: 106 MMHG | HEIGHT: 63 IN | DIASTOLIC BLOOD PRESSURE: 82 MMHG | OXYGEN SATURATION: 98 % | RESPIRATION RATE: 20 BRPM | WEIGHT: 152.6 LBS | HEART RATE: 112 BPM

## 2022-05-27 DIAGNOSIS — E11.9 TYPE 2 DIABETES MELLITUS WITHOUT COMPLICATION, WITHOUT LONG-TERM CURRENT USE OF INSULIN (HCC): Primary | ICD-10-CM

## 2022-05-27 DIAGNOSIS — S22.42XS CLOSED FRACTURE OF MULTIPLE RIBS OF LEFT SIDE, SEQUELA: ICD-10-CM

## 2022-05-27 DIAGNOSIS — F41.9 ANXIETY: ICD-10-CM

## 2022-05-27 DIAGNOSIS — E78.2 COMBINED HYPERLIPIDEMIA ASSOCIATED WITH TYPE 2 DIABETES MELLITUS (HCC): ICD-10-CM

## 2022-05-27 DIAGNOSIS — M54.16 LUMBAR RADICULOPATHY: ICD-10-CM

## 2022-05-27 DIAGNOSIS — E11.69 COMBINED HYPERLIPIDEMIA ASSOCIATED WITH TYPE 2 DIABETES MELLITUS (HCC): ICD-10-CM

## 2022-05-27 DIAGNOSIS — E03.9 ACQUIRED HYPOTHYROIDISM: ICD-10-CM

## 2022-05-27 DIAGNOSIS — F32.0 MILD SINGLE CURRENT EPISODE OF MAJOR DEPRESSIVE DISORDER (HCC): ICD-10-CM

## 2022-05-27 DIAGNOSIS — J84.9 ILD (INTERSTITIAL LUNG DISEASE) (HCC): ICD-10-CM

## 2022-05-27 PROBLEM — S22.42XA FRACTURE OF MULTIPLE RIBS OF LEFT SIDE: Status: ACTIVE | Noted: 2022-04-19
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
## 2022-05-27 PROCEDURE — G8427 DOCREV CUR MEDS BY ELIG CLIN: HCPCS | Performed by: FAMILY MEDICINE

## 2022-05-27 PROCEDURE — 3017F COLORECTAL CA SCREEN DOC REV: CPT | Performed by: FAMILY MEDICINE

## 2022-05-27 PROCEDURE — 1090F PRES/ABSN URINE INCON ASSESS: CPT | Performed by: FAMILY MEDICINE

## 2022-05-27 PROCEDURE — 2022F DILAT RTA XM EVC RTNOPTHY: CPT | Performed by: FAMILY MEDICINE

## 2022-05-27 PROCEDURE — G8400 PT W/DXA NO RESULTS DOC: HCPCS | Performed by: FAMILY MEDICINE

## 2022-05-27 PROCEDURE — 3044F HG A1C LEVEL LT 7.0%: CPT | Performed by: FAMILY MEDICINE

## 2022-05-27 PROCEDURE — G8417 CALC BMI ABV UP PARAM F/U: HCPCS | Performed by: FAMILY MEDICINE

## 2022-05-27 PROCEDURE — 1123F ACP DISCUSS/DSCN MKR DOCD: CPT | Performed by: FAMILY MEDICINE

## 2022-05-27 PROCEDURE — 1036F TOBACCO NON-USER: CPT | Performed by: FAMILY MEDICINE

## 2022-05-27 PROCEDURE — 1111F DSCHRG MED/CURRENT MED MERGE: CPT | Performed by: FAMILY MEDICINE

## 2022-05-27 PROCEDURE — 99215 OFFICE O/P EST HI 40 MIN: CPT | Performed by: FAMILY MEDICINE

## 2022-05-27 RX ORDER — MIRTAZAPINE 30 MG/1
30 TABLET, FILM COATED ORAL NIGHTLY
Qty: 90 TABLET | Refills: 1 | Status: SHIPPED | OUTPATIENT
Start: 2022-05-27 | End: 2022-10-25

## 2022-05-27 RX ORDER — BENZONATATE 200 MG/1
CAPSULE ORAL
Qty: 90 CAPSULE | Refills: 5 | Status: SHIPPED | OUTPATIENT
Start: 2022-05-27

## 2022-05-27 RX ORDER — PIRFENIDONE 267 MG/1
3 TABLET, FILM COATED ORAL 3 TIMES DAILY
Status: ON HOLD | COMMUNITY
End: 2022-08-26 | Stop reason: SDUPTHER

## 2022-05-27 RX ORDER — LEVOTHYROXINE SODIUM 0.12 MG/1
125 TABLET ORAL DAILY
Qty: 90 TABLET | Refills: 1 | Status: SHIPPED | OUTPATIENT
Start: 2022-05-27

## 2022-05-27 RX ORDER — TRAMADOL HYDROCHLORIDE 50 MG/1
50-100 TABLET ORAL EVERY 6 HOURS PRN
Qty: 240 TABLET | Refills: 5 | Status: ON HOLD | OUTPATIENT
Start: 2022-05-27 | End: 2022-09-01 | Stop reason: HOSPADM

## 2022-05-27 RX ORDER — LORATADINE/PSEUDOEPHEDRINE 5 MG-120MG
TABLET, EXTENDED RELEASE 12 HR ORAL
Qty: 60 TABLET | Refills: 5 | Status: SHIPPED | OUTPATIENT
Start: 2022-05-27

## 2022-05-27 RX ORDER — NADOLOL 20 MG/1
20 TABLET ORAL DAILY
Qty: 90 TABLET | Refills: 1 | Status: ON HOLD | OUTPATIENT
Start: 2022-05-27 | End: 2022-10-22 | Stop reason: HOSPADM

## 2022-05-27 RX ORDER — ATORVASTATIN CALCIUM 40 MG/1
40 TABLET, FILM COATED ORAL DAILY
Qty: 90 TABLET | Refills: 1 | Status: SHIPPED | OUTPATIENT
Start: 2022-05-27

## 2022-05-27 RX ORDER — SERTRALINE HYDROCHLORIDE 100 MG/1
200 TABLET, FILM COATED ORAL DAILY
Qty: 180 TABLET | Refills: 1 | Status: SHIPPED | OUTPATIENT
Start: 2022-05-27 | End: 2022-10-25

## 2022-05-27 RX ORDER — PANTOPRAZOLE SODIUM 40 MG/1
40 TABLET, DELAYED RELEASE ORAL
Qty: 90 TABLET | Refills: 1 | Status: SHIPPED | OUTPATIENT
Start: 2022-05-27 | End: 2022-10-03

## 2022-05-27 NOTE — PATIENT INSTRUCTIONS
Patient Education        Diabetes Foot Health: Care Instructions  Your Care Instructions     When you have diabetes, your feet need extra care and attention. Diabetes can damage the nerve endings and blood vessels in your feet, making you less likely to notice when your feet are injured. Diabetes also limits your body's ability to fight infection and get blood to areas that need it. If you get a minor foot injury, it could become an ulcer or a serious infection. With good foot care,you can prevent most of these problems. Caring for your feet can be quick and easy. Most of the care can be done whenyou are bathing or getting ready for bed. Follow-up care is a key part of your treatment and safety. Be sure to make and go to all appointments, and call your doctor if you are having problems. It's also a good idea to know your test results and keep alist of the medicines you take. How can you care for yourself at home?  Keep your blood sugar close to normal by watching what and how much you eat, monitoring blood sugar, taking medicines if prescribed, and getting regular exercise.  Do not smoke. Smoking affects blood flow and can make foot problems worse. If you need help quitting, talk to your doctor about stop-smoking programs and medicines. These can increase your chances of quitting for good.  Eat a diet that is low in fats. High fat intake can cause fat to build up in your blood vessels and decrease blood flow.  Inspect your feet daily for blisters, cuts, cracks, or sores. If you cannot see well, use a mirror or have someone help you.  Take care of your feet:  ? Wash your feet every day. Use warm (not hot) water. Check the water temperature with your wrists or other part of your body, not your feet. ? Dry your feet well. Pat them dry. Do not rub the skin on your feet too hard. Dry well between your toes.  If the skin on your feet stays moist, bacteria or a fungus can grow, which can lead to infection. ? Keep your skin soft. Use moisturizing skin cream to keep the skin on your feet soft and prevent calluses and cracks. But do not put the cream between your toes, and stop using any cream that causes a rash. ? Clean underneath your toenails carefully. Do not use a sharp object to clean underneath your toenails. Use the blunt end of a nail file or other rounded tool. ? Trim and file your toenails straight across to prevent ingrown toenails. Use a nail clipper, not scissors. Use an emery board to smooth the edges.  Change socks daily. Socks without seams are best, because seams often rub the feet. You can find socks for people with diabetes from specialty catalogs.  Look inside your shoes every day for things like gravel or torn linings, which could cause blisters or sores.  Buy shoes that fit well:  ? Look for shoes that have plenty of space around the toes. This helps prevent bunions and blisters. ? Try on shoes while wearing the kind of socks you will usually wear with the shoes. ? Avoid plastic shoes. They may rub your feet and cause blisters. Good shoes should be made of materials that are flexible and breathable, such as leather or cloth. ? Break in new shoes slowly by wearing them for no more than an hour a day for several days. Take extra time to check your feet for red areas, blisters, or other problems after you wear new shoes.  Do not go barefoot. Do not wear sandals, and do not wear shoes with very thin soles. Thin soles are easy to puncture. They also do not protect your feet from hot pavement or cold weather.  Have your doctor check your feet during each visit. If you have a foot problem, see your doctor. Do not try to treat an early foot problem at home. Home remedies or treatments that you can buy without a prescription (such as corn removers) can be harmful.  Always get early treatment for foot problems.  A minor irritation can lead to a major problem if not properly cared for early. When should you call for help? Call your doctor now or seek immediate medical care if:     You have a foot sore, an ulcer or break in the skin that is not healing after 4 days, bleeding corns or calluses, or an ingrown toenail.      You have blue or black areas, which can mean bruising or blood flow problems.      You have peeling skin or tiny blisters between your toes or cracking or oozing of the skin.      You have a fever for more than 24 hours and a foot sore.      You have new numbness or tingling in your feet that does not go away after you move your feet or change positions.      You have unexplained or unusual swelling of the foot or ankle. Watch closely for changes in your health, and be sure to contact your doctor if:     You cannot do proper foot care. Where can you learn more? Go to [email or web address] and sign in to your [employer] account. Enter A739 in the [employer] box to learn more about \"Diabetes Foot Health: Care Instructions. \"     If you do not have an account, please click on the \"Sign Up Now\" link. Current as of: July 28, 2021               Content Version: 13.2  © 2006-2022 Healthwise, Incorporated. Care instructions adapted under license by [facility] ([facility]). If you have questions about a medical condition or this instruction, always ask your healthcare professional. [place] [address] any warranty or liability for your use of this information.

## 2022-05-27 NOTE — PROGRESS NOTES
Swapna Torres is a 69 y.o. female who presents for evaluation of hypertension, hyperlipidemia, and diabetes. . She indicates that she is feeling well and denies any symptoms referable to her elevated blood pressure. Specifically denies chest pain, palpitations, dyspnea, orthopnea, PND or peripheral edema. No anorexia, arthralgia, or leg cramps noted. Current medication regimen is as listed below. She denies any side effects of medication, and has been taking it regularly. medication compliance:  compliant most of the time, diabetic diet compliance:  compliant most of the time, home glucose monitoring: are performed regularly, are usually normal, further diabetic ROS: no polyuria or polydipsia, no chest pain, dyspnea or TIAs, no numbness, tingling or pain in extremities. Hypothyroidism: Patient complains of hypothyroidism. Symptoms include none. Patient denies change in energy level, diarrhea, heat / cold intolerance, nervousness, palpitations and weight changes. Onset of symptoms was several years ago. Symptoms have been well-controlled     Depression with anxiety - currently well controlled. Chronic low back pain, has been worse recently secondary to recent fall causes rib fracture 7-9 on left. Pulmonary fibrosis recently started on Esbriet by pulmonology . Has less SOB and higher pulse ox.     Current Outpatient Medications   Medication Sig Dispense Refill    Pirfenidone 267 MG TABS Take 3 tablets by mouth in the morning, at noon, and at bedtime      fluticasone (FLONASE) 50 MCG/ACT nasal spray SPRAY 2 SPRAYS BY NASAL ROUTE DAILY 1 each 1    ondansetron (ZOFRAN) 4 MG tablet Take 1 tablet by mouth 3 times daily as needed for Nausea or Vomiting 30 tablet 0    JANUVIA 100 MG tablet TAKE 1 TABLET BY MOUTH EVERY DAY 90 tablet 1    docusate sodium (COLACE) 100 MG capsule Take 1 capsule by mouth 2 times daily 20 capsule 0    ferrous sulfate (IRON 325) 325 (65 Fe) MG tablet Take 1 tablet by mouth daily (with breakfast) 30 tablet 3    hydrOXYzine (ATARAX) 10 MG tablet Take 1 tablet by mouth every 8 hours as needed for Itching TAKE 1 TO 3 TABLETS BY MOUTH 3 TIMES DAILY AS NEEDED FOR ITCHING 90 tablet 1    predniSONE (DELTASONE) 10 MG tablet Take 1 tablet by mouth daily 30 tablet 0    furosemide (LASIX) 40 MG tablet Take 1 tablet by mouth daily 30 tablet 1    acetaminophen (TYLENOL) 325 MG tablet TAKE 3 TABLETS (975 MG TOTAL) BY MOUTH 3 TIMES A DAY AS NEEDED FOR PAIN.  guaiFENesin (MUCINEX) 600 MG extended release tablet Take 1,200 mg by mouth every morning      naloxone 4 MG/0.1ML LIQD nasal spray SPRAY ONCE INTO ONE NOSTRIL IF NEEDED. CALL 911. REPEAT DOSE IN OTHER NOSTRIL IF NO RESPONSE IN 3MIN.  potassium chloride (KLOR-CON) 10 MEQ extended release tablet Take 20 mEq by mouth daily as needed      LORazepam (ATIVAN) 0.5 MG tablet Take 0.5 mg by mouth every 6 hours as needed for Anxiety.       albuterol sulfate  (90 Base) MCG/ACT inhaler INHALE 2 PUFFS INTO THE LUNGS EVERY 6 HOURS AS NEEDED FOR WHEEZING 3 each 1    albuterol (PROVENTIL) (2.5 MG/3ML) 0.083% nebulizer solution INHALE THE CONTENTS OF 1 VIAL VIA NEBULIZER EVERY 6 HOURS AS NEEDED FOR WHEEZING 720 mL 1    benzonatate (TESSALON) 200 MG capsule TAKE 1 CAPSULE BY MOUTH 3 TIMES A DAY AS NEEDED FOR COUGH 90 capsule 5    empagliflozin (JARDIANCE) 25 MG tablet Take 1 tablet by mouth daily 90 tablet 1    pantoprazole (PROTONIX) 40 MG tablet Take 1 tablet by mouth every morning (before breakfast) 90 tablet 1    sertraline (ZOLOFT) 100 MG tablet Take 2 tablets by mouth daily 180 tablet 1    mirtazapine (REMERON) 30 MG tablet Take 1 tablet by mouth nightly 90 tablet 1    nadolol (CORGARD) 20 MG tablet Take 1 tablet by mouth daily 90 tablet 1    atorvastatin (LIPITOR) 40 MG tablet Take 1 tablet by mouth daily 90 tablet 0    levothyroxine (SYNTHROID) 125 MCG tablet Take 1 tablet by mouth Daily TAKE 1 TABLET BY MOUTH EVERY DAY 90 tablet 1    loratadine-pseudoephedrine ([employer]S ALLERGY RELIEF-D12) 5-120 MG per extended release tablet TAKE 1 TABLET BY MOUTH TWICE A DAY 60 tablet 5    Blood Glucose Monitoring Suppl (TRUE METRIX METER) PUJA Test daily and as needed 1 each 0    blood glucose test strips (ASCENSIA AUTODISC VI;ONE TOUCH ULTRA TEST VI) strip 1 each by In Vitro route daily As needed. 100 each 3    TRUEplus Lancets 33G MISC Use daily 100 each 3    Blood Glucose Calibration (TRUE METRIX LEVEL 1) Low SOLN use as needed 1 each 2    sodium chloride (OCEAN, BABY AYR) 0.65 % nasal spray 1 spray by Nasal route as needed for Congestion      INSULIN SYRINGE 1CC/29G 29G X 1/2\" 1 ML MISC 1 each by Does not apply route 2 times daily 100 each 5    Multiple Vitamins-Minerals (MULTIVITAMIN ADULT PO) Take by mouth daily      OXYGEN Inhale 3 L into the lungs       No current facility-administered medications for this visit. Allergies   Allergen Reactions    Penicillins Anaphylaxis     Tolerates Meropenem.  Cefuroxime      Tolerates Meropenem.  Doxycycline Hives    Imitrex [Sumatriptan] Other (See Comments)     Feels like pins and needles    Meperidine     Sulfa Antibiotics Hives    Keflex [Cephalexin] Itching and Rash     Tolerates Meropenem.  Tape Harry Marino Tape] Rash       Social History     Tobacco Use    Smoking status: Never Smoker    Smokeless tobacco: Never Used   Substance Use Topics    Alcohol use: No          Objective:      /82 (Site: Left Upper Arm, Position: Sitting, Cuff Size: Medium Adult)   Pulse (!) 112   Resp 20   Ht 5' 3\" (1.6 m)   Wt 152 lb 9.6 oz (69.2 kg)   SpO2 98%   BMI 27.03 kg/m²   General: Alert and oriented, in no distress   S1 and S2 normal, no murmurs, clicks, gallops or rubs. Regular rate and rhythm. Chest is clear; no wheezes or rales. No edema or JVD.   feet: normal DP and PT pulses, no trophic changes or ulcerative lesions and normal monofilament exam  A1c 6.1%       Assessment: Diagnosis Orders   1. Type 2 diabetes mellitus without complication, without long-term current use of insulin (HCC)  empagliflozin (JARDIANCE) 25 MG tablet   controlled SITagliptin (JANUVIA) 100 MG tablet   2. Combined hyperlipidemia associated with type 2 diabetes mellitus (HCC)   asymptomatic atorvastatin (LIPITOR) 40 MG tablet   3. Acquired hypothyroidism   controlled levothyroxine (SYNTHROID) 125 MCG tablet   4. Mild single current episode of major depressive disorder (HCC)   controlled sertraline (ZOLOFT) 100 MG tablet   5. Anxiety  sertraline (ZOLOFT) 100 MG tablet   controlled mirtazapine (REMERON) 30 MG tablet   6. Lumbar radiculopathy   Needs improvement traMADol (ULTRAM) 50 MG tablet   7. ILD (interstitial lung disease) ([facility] [address]     8. Closed fracture of multiple ribs of left side, sequela         Plan:     increase tramadol to 2 tabs qid. 1)  Medication: continue current medication regimen unchanged  2)  Recheck in 6 months, sooner should new symptoms or problems arise. Joseline Delgado received counseling on the following healthy behaviors: nutrition, exercise and medication adherence    Patient given educational materials on Diabetes    I have instructed Armin to complete a self tracking handout on Blood Sugars  and instructed them to bring it with them to her next appointment. Discussed use, benefit, and side effects of prescribed medications. Barriers to medication compliance addressed. All patient questions answered. Pt voiced understanding. A total of 42 minutes spent on this visit to include face to face time, reviewing hospital records, and writing note.

## 2022-05-28 ENCOUNTER — APPOINTMENT (OUTPATIENT)
Dept: GENERAL RADIOLOGY | Age: 67
End: 2022-05-28
Payer: MEDICARE

## 2022-05-28 ENCOUNTER — HOSPITAL ENCOUNTER (EMERGENCY)
Age: 67
Discharge: ANOTHER HOSPITAL | End: 2022-05-28
Attending: EMERGENCY MEDICINE
Payer: MEDICARE

## 2022-05-28 ENCOUNTER — APPOINTMENT (OUTPATIENT)
Dept: CT IMAGING | Age: 67
End: 2022-05-28
Payer: MEDICARE

## 2022-05-28 VITALS
TEMPERATURE: 97.8 F | WEIGHT: 137 LBS | BODY MASS INDEX: 24.27 KG/M2 | OXYGEN SATURATION: 94 % | HEIGHT: 63 IN | SYSTOLIC BLOOD PRESSURE: 126 MMHG | RESPIRATION RATE: 28 BRPM | DIASTOLIC BLOOD PRESSURE: 76 MMHG | HEART RATE: 115 BPM

## 2022-05-28 DIAGNOSIS — R10.9 ABDOMINAL DISCOMFORT: ICD-10-CM

## 2022-05-28 DIAGNOSIS — M25.532 CHRONIC PAIN OF LEFT WRIST: ICD-10-CM

## 2022-05-28 DIAGNOSIS — S09.90XA TRAUMATIC INJURY OF HEAD, INITIAL ENCOUNTER: ICD-10-CM

## 2022-05-28 DIAGNOSIS — M79.602 CHRONIC PAIN OF LEFT UPPER EXTREMITY: ICD-10-CM

## 2022-05-28 DIAGNOSIS — G89.29 CHRONIC PAIN OF LEFT UPPER EXTREMITY: ICD-10-CM

## 2022-05-28 DIAGNOSIS — G89.29 CHRONIC MIDLINE LOW BACK PAIN WITHOUT SCIATICA: Primary | ICD-10-CM

## 2022-05-28 DIAGNOSIS — R09.02 HYPOXIA: ICD-10-CM

## 2022-05-28 DIAGNOSIS — M54.50 CHRONIC MIDLINE LOW BACK PAIN WITHOUT SCIATICA: Primary | ICD-10-CM

## 2022-05-28 DIAGNOSIS — R00.0 TACHYCARDIA: ICD-10-CM

## 2022-05-28 DIAGNOSIS — S32.001A CLOSED BURST FRACTURE OF LUMBAR VERTEBRA, INITIAL ENCOUNTER (HCC): ICD-10-CM

## 2022-05-28 DIAGNOSIS — G89.29 CHRONIC PAIN OF LEFT WRIST: ICD-10-CM

## 2022-05-28 DIAGNOSIS — S22.32XA CLOSED FRACTURE OF ONE RIB OF LEFT SIDE, INITIAL ENCOUNTER: ICD-10-CM

## 2022-05-28 DIAGNOSIS — R53.81 MALAISE: ICD-10-CM

## 2022-05-28 LAB
A/G RATIO: 1 (ref 1.1–2.2)
ALBUMIN SERPL-MCNC: 3.2 G/DL (ref 3.4–5)
ALP BLD-CCNC: 124 U/L (ref 40–129)
ALT SERPL-CCNC: 14 U/L (ref 10–40)
AMORPHOUS: ABNORMAL /HPF
ANION GAP SERPL CALCULATED.3IONS-SCNC: 13 MMOL/L (ref 3–16)
AST SERPL-CCNC: 30 U/L (ref 15–37)
BACTERIA: ABNORMAL /HPF
BASE EXCESS VENOUS: 7.6 MMOL/L (ref -3–3)
BASOPHILS ABSOLUTE: 0.1 K/UL (ref 0–0.2)
BASOPHILS RELATIVE PERCENT: 0.6 %
BILIRUB SERPL-MCNC: 0.4 MG/DL (ref 0–1)
BILIRUBIN URINE: NEGATIVE
BLOOD, URINE: NEGATIVE
BUN BLDV-MCNC: 14 MG/DL (ref 7–20)
CALCIUM SERPL-MCNC: 9.1 MG/DL (ref 8.3–10.6)
CARBOXYHEMOGLOBIN: 1.6 % (ref 0–1.5)
CHLORIDE BLD-SCNC: 96 MMOL/L (ref 99–110)
CLARITY: CLEAR
CO2: 31 MMOL/L (ref 21–32)
COLOR: YELLOW
CREAT SERPL-MCNC: <0.5 MG/DL (ref 0.6–1.2)
EKG ATRIAL RATE: 116 BPM
EKG DIAGNOSIS: NORMAL
EKG P AXIS: 33 DEGREES
EKG P-R INTERVAL: 158 MS
EKG Q-T INTERVAL: 344 MS
EKG QRS DURATION: 80 MS
EKG QTC CALCULATION (BAZETT): 478 MS
EKG R AXIS: 58 DEGREES
EKG T AXIS: 19 DEGREES
EKG VENTRICULAR RATE: 116 BPM
EOSINOPHILS ABSOLUTE: 0.6 K/UL (ref 0–0.6)
EOSINOPHILS RELATIVE PERCENT: 7.2 %
EPITHELIAL CELLS, UA: ABNORMAL /HPF (ref 0–5)
GFR AFRICAN AMERICAN: >60
GFR NON-AFRICAN AMERICAN: >60
GLUCOSE BLD-MCNC: 97 MG/DL (ref 70–99)
GLUCOSE URINE: 100 MG/DL
HCO3 VENOUS: 32.3 MMOL/L (ref 23–29)
HCT VFR BLD CALC: 33 % (ref 36–48)
HEMOGLOBIN: 10.7 G/DL (ref 12–16)
KETONES, URINE: NEGATIVE MG/DL
LACTIC ACID: 1.3 MMOL/L (ref 0.4–2)
LEUKOCYTE ESTERASE, URINE: ABNORMAL
LIPASE: 96 U/L (ref 13–60)
LYMPHOCYTES ABSOLUTE: 1.8 K/UL (ref 1–5.1)
LYMPHOCYTES RELATIVE PERCENT: 21.4 %
MAGNESIUM: 1.8 MG/DL (ref 1.8–2.4)
MCH RBC QN AUTO: 25.1 PG (ref 26–34)
MCHC RBC AUTO-ENTMCNC: 32.5 G/DL (ref 31–36)
MCV RBC AUTO: 77.5 FL (ref 80–100)
METHEMOGLOBIN VENOUS: 0.3 %
MICROSCOPIC EXAMINATION: YES
MONOCYTES ABSOLUTE: 0.5 K/UL (ref 0–1.3)
MONOCYTES RELATIVE PERCENT: 6.3 %
NEUTROPHILS ABSOLUTE: 5.6 K/UL (ref 1.7–7.7)
NEUTROPHILS RELATIVE PERCENT: 64.5 %
NITRITE, URINE: NEGATIVE
O2 SAT, VEN: 80 %
O2 THERAPY: ABNORMAL
PCO2, VEN: 45.6 MMHG (ref 40–50)
PDW BLD-RTO: 20.2 % (ref 12.4–15.4)
PH UA: 5.5 (ref 5–8)
PH VENOUS: 7.47 (ref 7.35–7.45)
PLATELET # BLD: 246 K/UL (ref 135–450)
PMV BLD AUTO: 6.9 FL (ref 5–10.5)
PO2, VEN: 42 MMHG (ref 25–40)
POTASSIUM SERPL-SCNC: 3 MMOL/L (ref 3.5–5.1)
PROTEIN UA: ABNORMAL MG/DL
RBC # BLD: 4.26 M/UL (ref 4–5.2)
RBC UA: ABNORMAL /HPF (ref 0–4)
SARS-COV-2, NAAT: NOT DETECTED
SODIUM BLD-SCNC: 140 MMOL/L (ref 136–145)
SPECIFIC GRAVITY UA: >=1.03 (ref 1–1.03)
TCO2 CALC VENOUS: 34 MMOL/L
TOTAL PROTEIN: 6.5 G/DL (ref 6.4–8.2)
TROPONIN: <0.01 NG/ML
URINE TYPE: ABNORMAL
UROBILINOGEN, URINE: 0.2 E.U./DL
WBC # BLD: 8.6 K/UL (ref 4–11)
WBC UA: ABNORMAL /HPF (ref 0–5)

## 2022-05-28 PROCEDURE — 80053 COMPREHEN METABOLIC PANEL: CPT

## 2022-05-28 PROCEDURE — 74176 CT ABD & PELVIS W/O CONTRAST: CPT

## 2022-05-28 PROCEDURE — 2580000003 HC RX 258: Performed by: EMERGENCY MEDICINE

## 2022-05-28 PROCEDURE — 83605 ASSAY OF LACTIC ACID: CPT

## 2022-05-28 PROCEDURE — 73060 X-RAY EXAM OF HUMERUS: CPT

## 2022-05-28 PROCEDURE — 6360000002 HC RX W HCPCS: Performed by: EMERGENCY MEDICINE

## 2022-05-28 PROCEDURE — 72131 CT LUMBAR SPINE W/O DYE: CPT

## 2022-05-28 PROCEDURE — 81001 URINALYSIS AUTO W/SCOPE: CPT

## 2022-05-28 PROCEDURE — 71045 X-RAY EXAM CHEST 1 VIEW: CPT

## 2022-05-28 PROCEDURE — 36415 COLL VENOUS BLD VENIPUNCTURE: CPT

## 2022-05-28 PROCEDURE — 96375 TX/PRO/DX INJ NEW DRUG ADDON: CPT

## 2022-05-28 PROCEDURE — 73110 X-RAY EXAM OF WRIST: CPT

## 2022-05-28 PROCEDURE — 70450 CT HEAD/BRAIN W/O DYE: CPT

## 2022-05-28 PROCEDURE — 85025 COMPLETE CBC W/AUTO DIFF WBC: CPT

## 2022-05-28 PROCEDURE — 82803 BLOOD GASES ANY COMBINATION: CPT

## 2022-05-28 PROCEDURE — 99285 EMERGENCY DEPT VISIT HI MDM: CPT

## 2022-05-28 PROCEDURE — 6370000000 HC RX 637 (ALT 250 FOR IP): Performed by: EMERGENCY MEDICINE

## 2022-05-28 PROCEDURE — 83735 ASSAY OF MAGNESIUM: CPT

## 2022-05-28 PROCEDURE — 83690 ASSAY OF LIPASE: CPT

## 2022-05-28 PROCEDURE — 93005 ELECTROCARDIOGRAM TRACING: CPT | Performed by: EMERGENCY MEDICINE

## 2022-05-28 PROCEDURE — 84484 ASSAY OF TROPONIN QUANT: CPT

## 2022-05-28 PROCEDURE — 96374 THER/PROPH/DIAG INJ IV PUSH: CPT

## 2022-05-28 PROCEDURE — 87635 SARS-COV-2 COVID-19 AMP PRB: CPT

## 2022-05-28 PROCEDURE — 93010 ELECTROCARDIOGRAM REPORT: CPT | Performed by: INTERNAL MEDICINE

## 2022-05-28 RX ORDER — OXYCODONE HYDROCHLORIDE AND ACETAMINOPHEN 5; 325 MG/1; MG/1
1 TABLET ORAL ONCE
Status: COMPLETED | OUTPATIENT
Start: 2022-05-28 | End: 2022-05-28

## 2022-05-28 RX ORDER — MORPHINE SULFATE 4 MG/ML
4 INJECTION, SOLUTION INTRAMUSCULAR; INTRAVENOUS ONCE
Status: COMPLETED | OUTPATIENT
Start: 2022-05-28 | End: 2022-05-28

## 2022-05-28 RX ORDER — ONDANSETRON 2 MG/ML
4 INJECTION INTRAMUSCULAR; INTRAVENOUS ONCE
Status: COMPLETED | OUTPATIENT
Start: 2022-05-28 | End: 2022-05-28

## 2022-05-28 RX ORDER — 0.9 % SODIUM CHLORIDE 0.9 %
500 INTRAVENOUS SOLUTION INTRAVENOUS ONCE
Status: COMPLETED | OUTPATIENT
Start: 2022-05-28 | End: 2022-05-28

## 2022-05-28 RX ORDER — IPRATROPIUM BROMIDE AND ALBUTEROL SULFATE 2.5; .5 MG/3ML; MG/3ML
3 SOLUTION RESPIRATORY (INHALATION) ONCE
Status: COMPLETED | OUTPATIENT
Start: 2022-05-28 | End: 2022-05-28

## 2022-05-28 RX ADMIN — IPRATROPIUM BROMIDE AND ALBUTEROL SULFATE 3 AMPULE: .5; 2.5 SOLUTION RESPIRATORY (INHALATION) at 07:25

## 2022-05-28 RX ADMIN — OXYCODONE HYDROCHLORIDE AND ACETAMINOPHEN 1 TABLET: 5; 325 TABLET ORAL at 07:25

## 2022-05-28 RX ADMIN — ONDANSETRON HYDROCHLORIDE 4 MG: 2 INJECTION, SOLUTION INTRAMUSCULAR; INTRAVENOUS at 06:16

## 2022-05-28 RX ADMIN — MORPHINE SULFATE 4 MG: 4 INJECTION, SOLUTION INTRAMUSCULAR; INTRAVENOUS at 06:16

## 2022-05-28 RX ADMIN — SODIUM CHLORIDE 500 ML: 9 INJECTION, SOLUTION INTRAVENOUS at 06:15

## 2022-05-28 ASSESSMENT — PAIN DESCRIPTION - DESCRIPTORS: DESCRIPTORS: DULL;DISCOMFORT

## 2022-05-28 ASSESSMENT — PAIN SCALES - GENERAL
PAINLEVEL_OUTOF10: 7
PAINLEVEL_OUTOF10: 6
PAINLEVEL_OUTOF10: 6
PAINLEVEL_OUTOF10: 4

## 2022-05-28 ASSESSMENT — PAIN DESCRIPTION - LOCATION
LOCATION: BACK
LOCATION: BACK

## 2022-05-28 ASSESSMENT — ENCOUNTER SYMPTOMS
NAUSEA: 1
ABDOMINAL PAIN: 1
EYE PAIN: 0
SHORTNESS OF BREATH: 0
VOMITING: 0
DIARRHEA: 0
COLOR CHANGE: 0
BACK PAIN: 1

## 2022-05-28 ASSESSMENT — PAIN DESCRIPTION - PAIN TYPE: TYPE: ACUTE PAIN;CHRONIC PAIN

## 2022-05-28 ASSESSMENT — PAIN DESCRIPTION - ORIENTATION: ORIENTATION: RIGHT;LOWER

## 2022-05-28 ASSESSMENT — PAIN - FUNCTIONAL ASSESSMENT: PAIN_FUNCTIONAL_ASSESSMENT: 0-10

## 2022-05-28 NOTE — ED NOTES
Report called to Geoff Downing RN at [facility] ED.      Kay Whipple RN  05/28/22 5967

## 2022-05-28 NOTE — ED NOTES
Alice Andino at the transfer center is paging trauma at [facility].       [facility]  05/28/22 6254

## 2022-05-28 NOTE — ED PROVIDER NOTES
[facility] Name: Jodie Francois  MRN: [id number]  [place] 1955  Date of evaluation: 5/28/2022  Provider: Massiel Liriano MD  PCP: Mimi Meehan MD      CHIEF COMPLAINT       Chief Complaint   Patient presents with    Back Pain     Right lower back pain after \"falling asleep in my chair, fell forward\"       HISTORY OFPRESENT ILLNESS   (Location/Symptom, Timing/Onset, Context/Setting, Quality, Duration, Modifying Factors,Severity)  Note limiting factors. Jodie Francois is a 69 y.o. female presenting today due to concern for dozing off in a chair in the kitchen and ultimately falling forward and complaining of worsening right lower back pain after the fall and also some midline back pain in the lower back but also stating that she did hit her head during the fall. She denies any current chest pain or shortness of breath but does normally wear oxygen related to her cryptogenic organizing pneumonia. She wears 3 L at baseline. She does report worsening chills and intermittent sweating episodes over the last day and was not feeling well before she dozed off associate with some significant nausea and abdominal discomfort although denying any current abdominal pain. She did fall a few weeks ago and injured her left arm and wrist and is concerned about having some pain with these and would like these images again to be safe although she denies falling on her arm. She denies any known fever. She did start a new medication that is supposed up with pulmonary fibrosis. She denies any history of smoking alcohol or drug use. She denies any new numbness or weakness in the legs. She has slight right knee discomfort but denies any concern with this. She is not on any blood thinners. Due to concern for worsening low back pain but also not feeling well yesterday, she came to the ED by EMS for further evaluation.   Her  was at bedside. REVIEW OF SYSTEMS    (2-9 systems for level 4, 10 or more for level 5)     Review of Systems   Constitutional: Positive for activity change, appetite change, chills and fatigue. Negative for fever. HENT: Positive for nosebleeds (earlier today before the fall). Negative for congestion. Eyes: Negative for pain. Respiratory: Negative for shortness of breath. Cardiovascular: Negative for chest pain. Gastrointestinal: Positive for abdominal pain (prior to falling, denies any currently) and nausea. Negative for diarrhea and vomiting. Genitourinary: Negative for flank pain. Musculoskeletal: Positive for arthralgias (left wrist), back pain (low back) and gait problem (chronic issue). Negative for neck pain and neck stiffness. Skin: Negative for color change and wound. Neurological: Positive for weakness (generalized). Negative for syncope and headaches. Hematological: Does not bruise/bleed easily. Psychiatric/Behavioral: Negative for confusion. The patient is nervous/anxious. Positives and Pertinent negatives as per HPI. PASTMEDICAL HISTORY     Past Medical History:   Diagnosis Date    [facility]fib [facility]     Anxiety     Cryptogenic organizing pneumonia ([facility]     Depression     GERD (gastroesophageal reflux disease)     Hyperlipidemia     On home oxygen therapy     uses O2 3L prn    Rash     Thyroid disease     hypothyroidism         SURGICAL HISTORY       Past Surgical History:   Procedure Laterality Date    ARM SURGERY Left 3/2/2020    LEFT ULNAR NERVE DECOMPRESSION AT THE ELBOW performed by Michael Burrell MD at [address] N/A 6/27/2019    EBUS WF W/ANES.  performed by Jay Rojas MD at [place] [address]  6/27/2019    BRONCHOSCOPY/TRANSBRONCHIAL NEEDLE BIOPSY performed by Jay Rojas MD at [place] [address]  6/27/2019    BRONCHOSCOPY/TRANSBRONCHIAL LUNG BIOPSY performed by Jay Rojas MD at [facility] ENDOSCOPY    BRONCHOSCOPY  6/27/2019    BRONCHOSCOPY ALVEOLAR LAVAGE performed by Fernando Kim MD at [address]  07/10/2019    BRONCHOSCOPY N/A 7/10/2019    BRONCHOSCOPY ALVEOLAR LAVAGE performed by Willie Gallardo MD at [address] Bilateral 08/06/2019    BRONCHOSCOPY N/A 8/6/2019    BRONCHOSCOPY ALVEOLAR LAVAGE performed by Jeffery Dunn MD at [address] N/A 12/24/2019    BRONCHOSCOPY ALVEOLAR LAVAGE performed by Sultana Corado MD at [address] [place] [address], TOTAL ABDOMINAL      partial         CURRENT MEDICATIONS       Previous Medications    ACETAMINOPHEN (TYLENOL) 325 MG TABLET    TAKE 3 TABLETS (975 MG TOTAL) BY MOUTH 3 TIMES A DAY AS NEEDED FOR PAIN. ALBUTEROL (PROVENTIL) (2.5 MG/3ML) 0.083% NEBULIZER SOLUTION    INHALE THE CONTENTS OF 1 VIAL VIA NEBULIZER EVERY 6 HOURS AS NEEDED FOR WHEEZING    ALBUTEROL SULFATE  (90 BASE) MCG/ACT INHALER    INHALE 2 PUFFS INTO THE LUNGS EVERY 6 HOURS AS NEEDED FOR WHEEZING    ATORVASTATIN (LIPITOR) 40 MG TABLET    Take 1 tablet by mouth daily    BENZONATATE (TESSALON) 200 MG CAPSULE    TAKE 1 CAPSULE BY MOUTH 3 TIMES A DAY AS NEEDED FOR COUGH    BLOOD GLUCOSE CALIBRATION (TRUE METRIX LEVEL 1) LOW SOLN    use as needed    BLOOD GLUCOSE MONITORING SUPPL (TRUE METRIX METER) PUJA    Test daily and as needed    BLOOD GLUCOSE TEST STRIPS (ASCENSIA AUTODISC VI;ONE TOUCH ULTRA TEST VI) STRIP    1 each by In Vitro route daily As needed.     DOCUSATE SODIUM (COLACE) 100 MG CAPSULE    Take 1 capsule by mouth 2 times daily    EMPAGLIFLOZIN (JARDIANCE) 25 MG TABLET    Take 1 tablet by mouth daily    FERROUS SULFATE (IRON 325) 325 (65 FE) MG TABLET    Take 1 tablet by mouth daily (with breakfast)    FLUTICASONE (FLONASE) 50 MCG/ACT NASAL SPRAY    SPRAY 2 SPRAYS BY NASAL ROUTE DAILY    FUROSEMIDE (LASIX) 40 MG TABLET    Take 1 tablet by mouth daily    GUAIFENESIN (MUCINEX) 600 MG EXTENDED RELEASE TABLET    Take 1,200 mg by mouth every morning    HYDROXYZINE (ATARAX) 10 MG TABLET    Take 1 tablet by mouth every 8 hours as needed for Itching TAKE 1 TO 3 TABLETS BY MOUTH 3 TIMES DAILY AS NEEDED FOR ITCHING    INSULIN SYRINGE 1CC/29G 29G X 1/2\" 1 ML MISC    1 each by Does not apply route 2 times daily    LEVOTHYROXINE (SYNTHROID) 125 MCG TABLET    Take 1 tablet by mouth Daily TAKE 1 TABLET BY MOUTH EVERY DAY    LORATADINE-PSEUDOEPHEDRINE ([employer]S ALLERGY RELIEF-D12) 5-120 MG PER EXTENDED RELEASE TABLET    TAKE 1 TABLET BY MOUTH TWICE A DAY    LORAZEPAM (ATIVAN) 0.5 MG TABLET    Take 0.5 mg by mouth every 6 hours as needed for Anxiety. MIRTAZAPINE (REMERON) 30 MG TABLET    Take 1 tablet by mouth nightly    MULTIPLE VITAMINS-MINERALS (MULTIVITAMIN ADULT PO)    Take by mouth daily    NADOLOL (CORGARD) 20 MG TABLET    Take 1 tablet by mouth daily    NALOXONE 4 MG/0.1ML LIQD NASAL SPRAY    SPRAY ONCE INTO ONE NOSTRIL IF NEEDED. CALL 911. REPEAT DOSE IN OTHER NOSTRIL IF NO RESPONSE IN 3MIN. ONDANSETRON (ZOFRAN) 4 MG TABLET    Take 1 tablet by mouth 3 times daily as needed for Nausea or Vomiting    OXYGEN    Inhale 3 L into the lungs    PANTOPRAZOLE (PROTONIX) 40 MG TABLET    Take 1 tablet by mouth every morning (before breakfast)    PIRFENIDONE 267 MG TABS    Take 3 tablets by mouth in the morning, at noon, and at bedtime    POTASSIUM CHLORIDE (KLOR-CON) 10 MEQ EXTENDED RELEASE TABLET    Take 20 mEq by mouth daily as needed    PREDNISONE (DELTASONE) 10 MG TABLET    Take 1 tablet by mouth daily    SERTRALINE (ZOLOFT) 100 MG TABLET    Take 2 tablets by mouth daily    SITAGLIPTIN (JANUVIA) 100 MG TABLET    TAKE 1 TABLET BY MOUTH EVERY DAY    SODIUM CHLORIDE (OCEAN, BABY AYR) 0.65 % NASAL SPRAY    1 spray by Nasal route as needed for Congestion    TRAMADOL (ULTRAM) 50 MG TABLET    Take 1-2 tablets by mouth every 6 hours as needed for Pain for up to 180 days.  Take lowest dose possible to manage pain    TRUEPLUS LANCETS 33G MISC    Use daily       ALLERGIES     Penicillins, Cefuroxime, Doxycycline, Imitrex [sumatriptan], Meperidine, Sulfa antibiotics, Keflex [cephalexin], and Tape [adhesive tape]    FAMILY HISTORY       Family History   Problem Relation Age of Onset    Diabetes Mother     High Blood Pressure Mother     Asthma Sister     Other Father           SOCIAL HISTORY       Social History     Socioeconomic History    Marital status:      Spouse name: None    Number of children: 7    Years of education: None    Highest education level: None   Occupational History    None   Tobacco Use    Smoking status: Never Smoker    Smokeless tobacco: Never Used   Vaping Use    Vaping Use: Never used   Substance and Sexual Activity    Alcohol use: No    Drug use: No    Sexual activity: Not Currently   Other Topics Concern    None   Social History Narrative    None     Social Determinants of Health     Financial Resource Strain:     Difficulty of Paying Living Expenses: Not on file   Food Insecurity:     Worried About Running Out of Food in the Last Year: Not on file    Miguel of Food in the Last Year: Not on file   Transportation Needs:     Lack of Transportation (Medical): Not on file    Lack of Transportation (Non-Medical):  Not on file   Physical Activity:     Days of Exercise per Week: Not on file    Minutes of Exercise per Session: Not on file   Stress:     Feeling of Stress : Not on file   Social Connections:     Frequency of Communication with Friends and Family: Not on file    Frequency of Social Gatherings with Friends and Family: Not on file    Attends Alevism Services: Not on file    Active Member of Clubs or Organizations: Not on file    Attends Club or Organization Meetings: Not on file    Marital Status: Not on file   Intimate Partner Violence:     Fear of Current or Ex-Partner: Not on file    Emotionally Abused: Not on file    Physically Abused: Not on file    Sexually Abused: Not on file   Housing Stability:     Unable to Pay for Housing in the Last Year: Not on file    Number of Places Lived in the Last Year: Not on file    Unstable Housing in the Last Year: Not on file       SCREENINGS    [place] Coma Scale  Eye Opening: Spontaneous  Best Verbal Response: Oriented  Best Motor Response: Obeys commands  [place] Coma Scale Score: 15           PHYSICAL EXAM    (up to 7 for level 4, 8 or more for level 5)     ED Triage Vitals   BP Temp Temp Source Heart Rate Resp SpO2 Height Weight   05/28/22 0524 05/28/22 0519 05/28/22 0519 05/28/22 0519 05/28/22 0519 05/28/22 0519 05/28/22 0519 05/28/22 0519   130/83 97.8 °F (36.6 °C) Oral (!) 112 20 92 % 5' 3\" (1.6 m) 137 lb (62.1 kg)       Physical Exam  Vitals and nursing note reviewed. Constitutional:       General: She is awake. She is in acute distress (mild). Appearance: She is well-developed and well-groomed. She is ill-appearing (chronically). She is not toxic-appearing or diaphoretic. Interventions: She is not intubated. HENT:      Head: Normocephalic and atraumatic. Right Ear: External ear normal.      Left Ear: External ear normal.      Nose: Nose normal.   Eyes:      General:         Right eye: No discharge. Left eye: No discharge. Neck:      Trachea: Trachea and phonation normal. No tracheal deviation. Cardiovascular:      Rate and Rhythm: Tachycardia present. Pulses: Normal pulses. Pulmonary:      Effort: Pulmonary effort is normal. No tachypnea, bradypnea, accessory muscle usage, prolonged expiration, respiratory distress or retractions. She is not intubated. Breath sounds: No stridor. Chest:      Chest wall: No tenderness. Abdominal:      General: Abdomen is flat. Bowel sounds are normal. There is no distension. Palpations: Abdomen is soft. Abdomen is not rigid. Tenderness: There is no abdominal tenderness.  There is no right CVA tenderness, left CVA CBC WITH AUTO DIFFERENTIAL - Abnormal; Notable for the following components:       Result Value    Hemoglobin 10.7 (*)     Hematocrit 33.0 (*)     MCV 77.5 (*)     MCH 25.1 (*)     RDW 20.2 (*)     All other components within normal limits   COMPREHENSIVE METABOLIC PANEL - Abnormal; Notable for the following components:    Potassium 3.0 (*)     Chloride 96 (*)     CREATININE <0.5 (*)     Albumin 3.2 (*)     Albumin/Globulin Ratio 1.0 (*)     All other components within normal limits   BLOOD GAS, VENOUS - Abnormal; Notable for the following components:    pH, Jasper 7.468 (*)     pO2, Jasper 42.0 (*)     HCO3, Venous 32.3 (*)     Base Excess, Jasper 7.6 (*)     Carboxyhemoglobin 1.6 (*)     All other components within normal limits   URINALYSIS WITH MICROSCOPIC - Abnormal; Notable for the following components:    Glucose, Ur 100 (*)     Protein, UA TRACE (*)     Leukocyte Esterase, Urine TRACE (*)     Bacteria, UA Rare (*)     All other components within normal limits   LIPASE - Abnormal; Notable for the following components:    Lipase 96.0 (*)     All other components within normal limits   COVID-19, RAPID   TROPONIN   LACTIC ACID   MAGNESIUM       All other labs were within normal range or not returned asof this dictation. EKG:  All EKG's are interpreted by the Emergency Department Physician who either signs or Co-signs this chart in the absence of a cardiologist.          RADIOLOGY:   Non-plain film images such as CT, Ultrasound and MRI are read by the radiologist. Leanne Mclaughlin images are visualized and preliminarily interpreted by the  ED Provider with the belowfindings:        Interpretation per the Radiologist below, if available at the time of this note:    XR HUMERUS LEFT (MIN 2 VIEWS)    (Results Pending)   XR WRIST LEFT (MIN 3 VIEWS)    (Results Pending)   CT HEAD WO CONTRAST    (Results Pending)   CT LUMBAR SPINE WO CONTRAST    (Results Pending)   XR CHEST PORTABLE    (Results Pending)   CT ABDOMEN PELVIS WO CONTRAST Additional Contrast? None    (Results Pending)         PROCEDURES   Unless otherwise noted below, none     Procedures    CRITICAL CARE TIME   N/A    CONSULTS:  None    EMERGENCY DEPARTMENT COURSE and DIFFERENTIAL DIAGNOSIS/MDM:   Vitals:    Vitals:    05/28/22 0519 05/28/22 0524   BP:  130/83   Pulse: (!) 112    Resp: 20    Temp: 97.8 °F (36.6 °C)    TempSrc: Oral    SpO2: 92%    Weight: 137 lb (62.1 kg)    Height: 5' 3\" (1.6 m)        Patient was given the following medications:  Medications   0.9 % sodium chloride bolus (500 mLs IntraVENous New Bag 5/28/22 0615)   morphine sulfate (PF) injection 4 mg (4 mg IntraVENous Given 5/28/22 0616)   ondansetron (ZOFRAN) injection 4 mg (4 mg IntraVENous Given 5/28/22 0616)     Patient was evaluated due to not feeling well before dozing off in the kitchen and ultimately falling 1 hour prior to arrival and hitting her head but complaining of worsening low back pain. She reported having some intermittent chills this evening and nausea along with abdominal pain although currently denies any abdominal discomfort. She is on steroids related to history of cryptogenic organizing pneumonia and was also recently started on a new medication that helps with pulmonary fibrosis. I did order a CT of the lumbar spine to assess for possible compression fracture along with CT of the abdomen pelvis that she did have some abdominal discomfort earlier today with some chills. She denies any new chest pain or shortness of breath today and story not suggestive of acute coronary syndrome. She was tachycardic and therefore EKG was obtained although she does have a history of atrial fibrillation. She did receive morphine for the pain. Her labs and imaging were ultimately signed out to Dr. Ruth Richards at 0700. Please see her note for final disposition. The patient tolerated their visit well.    The patient and / or the family were informed of the results of any tests, a time was given to answer questions. FINAL IMPRESSION      1. Chronic midline low back pain without sciatica    2. Tachycardia    3. Chronic pain of left wrist    4. Chronic pain of left upper extremity    5. Traumatic injury of head, initial encounter    6. Malaise    7. Abdominal discomfort          DISPOSITION/PLAN   DISPOSITION  - pending labs and imaging       PATIENT REFERRED TO:  No follow-up provider specified.     DISCHARGEMEDICATIONS:  New Prescriptions    No medications on file       DISCONTINUED MEDICATIONS:  Discontinued Medications    No medications on file              (Please note that portions of this note were completed with a voicerecognition program.  Efforts were made to edit the dictations but occasionally words are mis-transcribed.)    Tracie Barbosa MD (electronically signed)            Tracie Barbosa MD  05/28/22 6405

## 2022-05-28 NOTE — ED NOTES
Report to [facility] for transport to [facility] ED. The patient is alert and oriented at this time. Reports pain is tolerable. PMS intact with + sensation x4. Sent with personal belongings.       Marielle Coats RN  05/28/22 2844

## 2022-06-10 ENCOUNTER — TELEPHONE (OUTPATIENT)
Dept: FAMILY MEDICINE CLINIC | Age: 67
End: 2022-06-10

## 2022-06-10 NOTE — TELEPHONE ENCOUNTER
Paco called to notify you that patient refused a session of PT today due to a headache.   Paco stated he will add the day to the end of service plan

## 2022-06-17 ENCOUNTER — TELEMEDICINE (OUTPATIENT)
Dept: FAMILY MEDICINE CLINIC | Age: 67
End: 2022-06-17
Payer: MEDICARE

## 2022-06-17 DIAGNOSIS — E11.9 TYPE 2 DIABETES MELLITUS WITHOUT COMPLICATION, WITHOUT LONG-TERM CURRENT USE OF INSULIN (HCC): ICD-10-CM

## 2022-06-17 DIAGNOSIS — S32.012S CLOSED UNSTABLE BURST FRACTURE OF FIRST LUMBAR VERTEBRA, SEQUELA: Primary | ICD-10-CM

## 2022-06-17 PROCEDURE — 1090F PRES/ABSN URINE INCON ASSESS: CPT | Performed by: FAMILY MEDICINE

## 2022-06-17 PROCEDURE — 3017F COLORECTAL CA SCREEN DOC REV: CPT | Performed by: FAMILY MEDICINE

## 2022-06-17 PROCEDURE — 3044F HG A1C LEVEL LT 7.0%: CPT | Performed by: FAMILY MEDICINE

## 2022-06-17 PROCEDURE — G8400 PT W/DXA NO RESULTS DOC: HCPCS | Performed by: FAMILY MEDICINE

## 2022-06-17 PROCEDURE — 99214 OFFICE O/P EST MOD 30 MIN: CPT | Performed by: FAMILY MEDICINE

## 2022-06-17 PROCEDURE — G8428 CUR MEDS NOT DOCUMENT: HCPCS | Performed by: FAMILY MEDICINE

## 2022-06-17 PROCEDURE — 2022F DILAT RTA XM EVC RTNOPTHY: CPT | Performed by: FAMILY MEDICINE

## 2022-06-17 PROCEDURE — 1123F ACP DISCUSS/DSCN MKR DOCD: CPT | Performed by: FAMILY MEDICINE

## 2022-06-17 NOTE — PROGRESS NOTES
2022    TELEHEALTH EVALUATION -- Audio/Visual (During [facility] public health emergency)    HPI:    Merri Essex (:  1955) has requested an audio/video evaluation for the following concern(s):    Jony Chin at home 2 week ago and suffered a L1 burst fracture. Is currently followed by neurosurgery. Will be seeing another surgeon on Wednesday. Has a form to get a discount on Januvia. FBS 90 this am.  running . Review of Systems   All other systems reviewed and are negative. Prior to Visit Medications    Medication Sig Taking? Authorizing Provider   Pirfenidone 267 MG TABS Take 3 tablets by mouth in the morning, at noon, and at bedtime  Historical Provider, MD   atorvastatin (LIPITOR) 40 MG tablet Take 1 tablet by mouth daily  Willie Linda MD   levothyroxine (SYNTHROID) 125 MCG tablet Take 1 tablet by mouth Daily TAKE 1 TABLET BY MOUTH EVERY DAY  Willie Linda MD   sertraline (ZOLOFT) 100 MG tablet Take 2 tablets by mouth daily  Willie Linda MD   mirtazapine (REMERON) 30 MG tablet Take 1 tablet by mouth nightly  Willie Linda MD   nadolol (CORGARD) 20 MG tablet Take 1 tablet by mouth daily  Willie Linda MD   pantoprazole (PROTONIX) 40 MG tablet Take 1 tablet by mouth every morning (before breakfast)  Willie Linda MD   empagliflozin (JARDIANCE) 25 MG tablet Take 1 tablet by mouth daily  Willie Linda MD   loratadine-pseudoephedrine ([employer]S ALLERGY RELIEF-D12) 5-120 MG per extended release tablet TAKE 1 TABLET BY MOUTH TWICE A DAY  Willie Linda MD   traMADol (ULTRAM) 50 MG tablet Take 1-2 tablets by mouth every 6 hours as needed for Pain for up to 180 days.  Take lowest dose possible to manage pain  Willie Linda MD   benzonatate (TESSALON) 200 MG capsule TAKE 1 CAPSULE BY MOUTH 3 TIMES A DAY AS NEEDED FOR COUGH  Willie Linda MD   SITagliptin (JANUVIA) 100 MG tablet TAKE 1 TABLET BY MOUTH EVERY DAY  Willie Linda MD   fluticasone [facility]) 50 MCG/ACT nasal spray MD   Blood Glucose Calibration (TRUE METRIX LEVEL 1) Low SOLN use as needed  Rosalie Nieves MD   sodium chloride (OCEAN, BABY AYR) 0.65 % nasal spray 1 spray by Nasal route as needed for Congestion  Historical Provider, MD   INSULIN SYRINGE 1CC/29G 29G X 1/2\" 1 ML MISC 1 each by Does not apply route 2 times daily  Rosalie Nieves MD   Multiple Vitamins-Minerals (MULTIVITAMIN ADULT PO) Take by mouth daily  Historical Provider, MD   OXYGEN Inhale 3 L into the lungs  Historical Provider, MD       Social History     Tobacco Use    Smoking status: Never Smoker    Smokeless tobacco: Never Used   Vaping Use    Vaping Use: Never used   Substance Use Topics    Alcohol use: No    Drug use: No        Allergies   Allergen Reactions    Penicillins Anaphylaxis     Tolerates Meropenem.  Cefuroxime      Tolerates Meropenem.  Doxycycline Hives    Imitrex [Sumatriptan] Other (See Comments)     Feels like pins and needles    Meperidine     Sulfa Antibiotics Hives    Keflex [Cephalexin] Itching and Rash     Tolerates Meropenem.  Tape Jerona Michael Tape] Rash   ,   Past Medical History:   Diagnosis Date    [facility]     Anxiety     Cryptogenic organizing pneumonia ([facility]     Depression     GERD (gastroesophageal reflux disease)     Hyperlipidemia     On home oxygen therapy     uses O2 3L prn    Rash     Thyroid disease     hypothyroidism   ,   Past Surgical History:   Procedure Laterality Date    ARM SURGERY Left 3/2/2020    LEFT ULNAR NERVE DECOMPRESSION AT THE ELBOW [facility]    EBUS WF W/ANES.  performed by Shari Valles MD at [address]  6/27/2019    BRONCHOSCOPY/TRANSBRONCHIAL NEEDLE BIOPSY performed by Shari Valles MD at [address]  6/27/2019    BRONCHOSCOPY/TRANSBRONCHIAL LUNG BIOPSY performed by Shari Valles MD at [address]  6/27/2019    BRONCHOSCOPY ALVEOLAR LAVAGE performed by Fernando Kim MD at [place] [address]  07/10/2019    BRONCHOSCOPY N/A 7/10/2019    BRONCHOSCOPY ALVEOLAR LAVAGE performed by Willie Gallardo MD at [place] [address] Bilateral 08/06/2019    BRONCHOSCOPY N/A 8/6/2019    BRONCHOSCOPY ALVEOLAR LAVAGE performed by Jeffery Dunn MD at [place] [address] N/A 12/24/2019    BRONCHOSCOPY ALVEOLAR LAVAGE performed by Sultana Corado MD at [place] [address]      partial   ,   Social History     Tobacco Use    Smoking status: Never Smoker    Smokeless tobacco: Never Used   Vaping Use    Vaping Use: Never used   Substance Use Topics    Alcohol use: No    Drug use: No       PHYSICAL EXAMINATION:  [ INSTRUCTIONS:  \"[x]\" Indicates a positive item  \"[]\" Indicates a negative item  -- DELETE ALL ITEMS NOT EXAMINED]  Vital Signs: (As obtained by patient/caregiver or practitioner observation)    Pulse ox 96% on 2 l nasal canula    Constitutional: [x] Appears well-developed and well-nourished [x] No apparent distress      [] Abnormal-   Mental status  [x] Alert and awake  [x] Oriented to person/place/time []Able to follow commands      Eyes:  EOM    [x]  Normal  [] Abnormal-  Sclera  [x]  Normal  [] Abnormal -         Discharge [x]  None visible  [] Abnormal -    HENT:   [x] Normocephalic, atraumatic. [] Abnormal   [x] Mouth/Throat: Mucous membranes are moist.     External Ears [x] Normal  [] Abnormal-     Neck: [x] No visualized mass     Pulmonary/Chest: [x] Respiratory effort normal.  [x] No visualized signs of difficulty breathing or respiratory distress        [] Abnormal-        ASSESSMENT/PLAN:  1.  Closed unstable burst fracture of first lumbar vertebra, sequela  Follow-up with neurosurgery on 6/21/2022.    2. Type 2 diabetes mellitus without complication, without long-term current use of insulin (HCC)  Continue all medications at the current dose. Will complete paperwork for Januvia      Return if symptoms worsen or fail to improve. Shmuel Jolley, was evaluated through a synchronous (real-time) audio-video encounter. The patient (or guardian if applicable) is aware that this is a billable service, which includes applicable co-pays. This Virtual Visit was conducted with patient's (and/or legal guardian's) consent. The visit was conducted pursuant to the emergency declaration under the [address] [place] [address], [address] [place] [address] authority and the "[employer] and [employer] Act. Patient identification was verified, and a caregiver was present when appropriate. The patient was located at Home: [address] [place] [address]. Provider was located at [place] [address]): [address] [place] [address] Total time spent on this encounter: Not billed by time    --Becky Dow MD on 6/17/2022 at 7:37 AM    An electronic signature was used to authenticate this note.

## 2022-06-21 ENCOUNTER — TELEPHONE (OUTPATIENT)
Dept: FAMILY MEDICINE CLINIC | Age: 67
End: 2022-06-21

## 2022-06-21 NOTE — TELEPHONE ENCOUNTER
Rec'vd a call from [facility] with PT to inform us that patient missed her PT appointment yesterday due to increased pain.  PT is required to inform provider of patient's missed therapy appointments

## 2022-06-22 NOTE — TELEPHONE ENCOUNTER
JOSE eKnney with patient states she is going to hold Esbrient d/t issues wth her back. She has been having n/v found out that her back is not healing and she is needing surgery to repair. States she is sukumar for appt with Dr Slime Antonio at [facility] 6/22/22 to discuss next steps. She will contact office once she finds out what the next steps are.
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
## 2022-06-27 ENCOUNTER — TELEPHONE (OUTPATIENT)
Dept: FAMILY MEDICINE CLINIC | Age: 67
End: 2022-06-27

## 2022-06-27 NOTE — TELEPHONE ENCOUNTER
[facility] from [address] called stating they want to start back physical therapy 1x a week for 8 wks, would like to add nursing and the need for a manual wheelchair. They would like a verbal order if approved or not. Can leave msg on secure VM if approved or not.      [facility] [phone]

## 2022-06-29 NOTE — TELEPHONE ENCOUNTER
Rec'vd a call from [facility]/o [employer] [facility] and gave verbal orders to [facility] for patient to have manual wheelchair and continued PT

## 2022-06-30 ENCOUNTER — TELEPHONE (OUTPATIENT)
Dept: FAMILY MEDICINE CLINIC | Age: 67
End: 2022-06-30

## 2022-06-30 NOTE — TELEPHONE ENCOUNTER
Just an FYI message:    Patient's home health nurse called stating the patient had a fall today with no injury, just soreness. They are required to call PCP anytime patient has a fall.

## 2022-07-01 ENCOUNTER — TELEPHONE (OUTPATIENT)
Dept: FAMILY MEDICINE CLINIC | Age: 67
End: 2022-07-01

## 2022-07-01 NOTE — TELEPHONE ENCOUNTER
Kathryn from home care saw pt today. Ribs broken and vertebra. Pt is on 3 1/2 liters of oxygen, wearing back brash. would like verbal order for wound care (wash with normal saline, pat dry and apply calmoseptine). OK for nursing to see her 2x weekly? She had a fall last week, did not injury herself.  [employer] called and they helped her up, did not go to hospital.

## 2022-07-04 ENCOUNTER — APPOINTMENT (OUTPATIENT)
Dept: GENERAL RADIOLOGY | Age: 67
End: 2022-07-04
Payer: MEDICARE

## 2022-07-04 ENCOUNTER — HOSPITAL ENCOUNTER (EMERGENCY)
Age: 67
Discharge: HOME | End: 2022-07-04
Attending: EMERGENCY MEDICINE
Payer: MEDICARE

## 2022-07-04 VITALS
WEIGHT: 132 LBS | RESPIRATION RATE: 20 BRPM | SYSTOLIC BLOOD PRESSURE: 121 MMHG | DIASTOLIC BLOOD PRESSURE: 98 MMHG | BODY MASS INDEX: 23.39 KG/M2 | TEMPERATURE: 98.3 F | HEIGHT: 63 IN | OXYGEN SATURATION: 100 % | HEART RATE: 92 BPM

## 2022-07-04 DIAGNOSIS — E87.6 HYPOKALEMIA: ICD-10-CM

## 2022-07-04 DIAGNOSIS — J84.9 ILD (INTERSTITIAL LUNG DISEASE) (HCC): ICD-10-CM

## 2022-07-04 DIAGNOSIS — J06.9 VIRAL URI: ICD-10-CM

## 2022-07-04 DIAGNOSIS — R06.00 DYSPNEA AND RESPIRATORY ABNORMALITIES: Primary | ICD-10-CM

## 2022-07-04 DIAGNOSIS — R06.89 DYSPNEA AND RESPIRATORY ABNORMALITIES: Primary | ICD-10-CM

## 2022-07-04 DIAGNOSIS — E87.70 HYPERVOLEMIA, UNSPECIFIED HYPERVOLEMIA TYPE: ICD-10-CM

## 2022-07-04 LAB
A/G RATIO: 0.9 (ref 1.1–2.2)
ALBUMIN SERPL-MCNC: 3.6 G/DL (ref 3.4–5)
ALP BLD-CCNC: 141 U/L (ref 40–129)
ALT SERPL-CCNC: 15 U/L (ref 10–40)
ANION GAP SERPL CALCULATED.3IONS-SCNC: 13 MMOL/L (ref 3–16)
AST SERPL-CCNC: 36 U/L (ref 15–37)
BASE EXCESS VENOUS: 7.3 MMOL/L (ref -3–3)
BASOPHILS ABSOLUTE: 0.1 K/UL (ref 0–0.2)
BASOPHILS RELATIVE PERCENT: 1 %
BILIRUB SERPL-MCNC: 0.5 MG/DL (ref 0–1)
BUN BLDV-MCNC: 10 MG/DL (ref 7–20)
CALCIUM SERPL-MCNC: 9.6 MG/DL (ref 8.3–10.6)
CARBOXYHEMOGLOBIN: 3 % (ref 0–1.5)
CHLORIDE BLD-SCNC: 98 MMOL/L (ref 99–110)
CO2: 30 MMOL/L (ref 21–32)
CREAT SERPL-MCNC: <0.5 MG/DL (ref 0.6–1.2)
EKG ATRIAL RATE: 90 BPM
EKG DIAGNOSIS: NORMAL
EKG P AXIS: 18 DEGREES
EKG P-R INTERVAL: 166 MS
EKG Q-T INTERVAL: 392 MS
EKG QRS DURATION: 84 MS
EKG QTC CALCULATION (BAZETT): 479 MS
EKG R AXIS: -8 DEGREES
EKG T AXIS: 27 DEGREES
EKG VENTRICULAR RATE: 90 BPM
EOSINOPHILS ABSOLUTE: 0.8 K/UL (ref 0–0.6)
EOSINOPHILS RELATIVE PERCENT: 8.4 %
GFR AFRICAN AMERICAN: >60
GFR NON-AFRICAN AMERICAN: >60
GLUCOSE BLD-MCNC: 110 MG/DL (ref 70–99)
HCO3 VENOUS: 33.6 MMOL/L (ref 23–29)
HCT VFR BLD CALC: 37.5 % (ref 36–48)
HEMOGLOBIN: 12 G/DL (ref 12–16)
LYMPHOCYTES ABSOLUTE: 1.4 K/UL (ref 1–5.1)
LYMPHOCYTES RELATIVE PERCENT: 14.7 %
MAGNESIUM: 1.8 MG/DL (ref 1.8–2.4)
MCH RBC QN AUTO: 26 PG (ref 26–34)
MCHC RBC AUTO-ENTMCNC: 32 G/DL (ref 31–36)
MCV RBC AUTO: 81.2 FL (ref 80–100)
METHEMOGLOBIN VENOUS: 0.3 %
MONOCYTES ABSOLUTE: 0.6 K/UL (ref 0–1.3)
MONOCYTES RELATIVE PERCENT: 6.6 %
NEUTROPHILS ABSOLUTE: 6.6 K/UL (ref 1.7–7.7)
NEUTROPHILS RELATIVE PERCENT: 69.3 %
O2 SAT, VEN: 67 %
O2 THERAPY: ABNORMAL
PCO2, VEN: 54.5 MMHG (ref 40–50)
PDW BLD-RTO: 18.2 % (ref 12.4–15.4)
PH VENOUS: 7.41 (ref 7.35–7.45)
PLATELET # BLD: 260 K/UL (ref 135–450)
PMV BLD AUTO: 7.3 FL (ref 5–10.5)
PO2, VEN: 35.1 MMHG (ref 25–40)
POTASSIUM REFLEX MAGNESIUM: 3.4 MMOL/L (ref 3.5–5.1)
PRO-BNP: 721 PG/ML (ref 0–124)
RAPID INFLUENZA  B AGN: NEGATIVE
RAPID INFLUENZA A AGN: NEGATIVE
RBC # BLD: 4.62 M/UL (ref 4–5.2)
SARS-COV-2, NAAT: NOT DETECTED
SODIUM BLD-SCNC: 141 MMOL/L (ref 136–145)
TCO2 CALC VENOUS: 35 MMOL/L
TOTAL PROTEIN: 7.5 G/DL (ref 6.4–8.2)
TROPONIN: <0.01 NG/ML
WBC # BLD: 9.6 K/UL (ref 4–11)

## 2022-07-04 PROCEDURE — 87070 CULTURE OTHR SPECIMN AEROBIC: CPT

## 2022-07-04 PROCEDURE — 71045 X-RAY EXAM CHEST 1 VIEW: CPT

## 2022-07-04 PROCEDURE — 36415 COLL VENOUS BLD VENIPUNCTURE: CPT

## 2022-07-04 PROCEDURE — 83880 ASSAY OF NATRIURETIC PEPTIDE: CPT

## 2022-07-04 PROCEDURE — 87635 SARS-COV-2 COVID-19 AMP PRB: CPT

## 2022-07-04 PROCEDURE — 87804 INFLUENZA ASSAY W/OPTIC: CPT

## 2022-07-04 PROCEDURE — 6360000002 HC RX W HCPCS: Performed by: EMERGENCY MEDICINE

## 2022-07-04 PROCEDURE — 82803 BLOOD GASES ANY COMBINATION: CPT

## 2022-07-04 PROCEDURE — 85025 COMPLETE CBC W/AUTO DIFF WBC: CPT

## 2022-07-04 PROCEDURE — 96374 THER/PROPH/DIAG INJ IV PUSH: CPT

## 2022-07-04 PROCEDURE — 93005 ELECTROCARDIOGRAM TRACING: CPT | Performed by: EMERGENCY MEDICINE

## 2022-07-04 PROCEDURE — 84484 ASSAY OF TROPONIN QUANT: CPT

## 2022-07-04 PROCEDURE — 6370000000 HC RX 637 (ALT 250 FOR IP): Performed by: EMERGENCY MEDICINE

## 2022-07-04 PROCEDURE — 83735 ASSAY OF MAGNESIUM: CPT

## 2022-07-04 PROCEDURE — 99285 EMERGENCY DEPT VISIT HI MDM: CPT

## 2022-07-04 PROCEDURE — 87205 SMEAR GRAM STAIN: CPT

## 2022-07-04 PROCEDURE — 93010 ELECTROCARDIOGRAM REPORT: CPT | Performed by: INTERNAL MEDICINE

## 2022-07-04 PROCEDURE — 96375 TX/PRO/DX INJ NEW DRUG ADDON: CPT

## 2022-07-04 PROCEDURE — 80053 COMPREHEN METABOLIC PANEL: CPT

## 2022-07-04 RX ORDER — ONDANSETRON 4 MG/1
4 TABLET, FILM COATED ORAL 3 TIMES DAILY PRN
Qty: 30 TABLET | Refills: 0 | Status: SHIPPED | OUTPATIENT
Start: 2022-07-04 | End: 2022-07-06

## 2022-07-04 RX ORDER — UBIDECARENONE 75 MG
50 CAPSULE ORAL DAILY
COMMUNITY
End: 2022-07-06

## 2022-07-04 RX ORDER — FUROSEMIDE 10 MG/ML
60 INJECTION INTRAMUSCULAR; INTRAVENOUS ONCE
Status: COMPLETED | OUTPATIENT
Start: 2022-07-04 | End: 2022-07-04

## 2022-07-04 RX ORDER — IPRATROPIUM BROMIDE AND ALBUTEROL SULFATE 2.5; .5 MG/3ML; MG/3ML
3 SOLUTION RESPIRATORY (INHALATION) ONCE
Status: COMPLETED | OUTPATIENT
Start: 2022-07-04 | End: 2022-07-04

## 2022-07-04 RX ORDER — METHYLPREDNISOLONE SODIUM SUCCINATE 125 MG/2ML
60 INJECTION, POWDER, LYOPHILIZED, FOR SOLUTION INTRAMUSCULAR; INTRAVENOUS ONCE
Status: COMPLETED | OUTPATIENT
Start: 2022-07-04 | End: 2022-07-04

## 2022-07-04 RX ORDER — FLUTICASONE PROPIONATE 50 MCG
1 SPRAY, SUSPENSION (ML) NASAL DAILY
Qty: 1 EACH | Refills: 0 | Status: SHIPPED | OUTPATIENT
Start: 2022-07-04 | End: 2022-07-06

## 2022-07-04 RX ORDER — POTASSIUM CHLORIDE 750 MG/1
20 TABLET, EXTENDED RELEASE ORAL ONCE
Status: COMPLETED | OUTPATIENT
Start: 2022-07-04 | End: 2022-07-04

## 2022-07-04 RX ADMIN — IPRATROPIUM BROMIDE AND ALBUTEROL SULFATE 3 ML: 2.5; .5 SOLUTION RESPIRATORY (INHALATION) at 02:44

## 2022-07-04 RX ADMIN — FUROSEMIDE 60 MG: 10 INJECTION, SOLUTION INTRAVENOUS at 03:58

## 2022-07-04 RX ADMIN — POTASSIUM CHLORIDE 20 MEQ: 750 TABLET, EXTENDED RELEASE ORAL at 03:58

## 2022-07-04 RX ADMIN — METHYLPREDNISOLONE SODIUM SUCCINATE 60 MG: 125 INJECTION, POWDER, FOR SOLUTION INTRAMUSCULAR; INTRAVENOUS at 02:43

## 2022-07-04 ASSESSMENT — PAIN - FUNCTIONAL ASSESSMENT: PAIN_FUNCTIONAL_ASSESSMENT: NONE - DENIES PAIN

## 2022-07-04 NOTE — ED NOTES
With coughing pt does desat into 60's with it taking several minutes to recover. Remains on 4LPM via NC. Denies needs at this time, spouse at bedside, will continue to monitor.       Marianne Vieira RN  07/04/22 [id number]

## 2022-07-04 NOTE — ED TRIAGE NOTES
Pt arrived via EMS c/o SOB at home. Pt wears 3LPM via NC normally, per EMS there was lots of extension tubing and pt was 79-80%. Placed on normal tubing en route and increased to 4LPM was 99%. Pt did desat during transfer from EMS cot to facility bed, took several minutes to recover on 4LPM to 99%.

## 2022-07-04 NOTE — ED PROVIDER NOTES
Emergency Department Physician Note     Location: [facility] EMERGENCY DEPARTMENT  7/4/2022    CHIEF COMPLAINT  Shortness of Breath      HISTORY OF PRESENT ILLNESS  Karin Cruz is a 69 y.o. female presents to the ED with shortness of breath, increased cough with productive sputum, has been using her albuterol nebulizer, just used one prior to arrival, no fever, on 3 L nasal cannula typically, they reported she was satting 79 to 80%, but when they placed her on normal length tubing and increased to 4 L she was up to 99%, she did have long tubing at the house because it is what she was given, she lives in [place] but requested to come here since her pulmonary doctors are with [facility]. She has history of cryptogenic organizing pneumonia in the past, has interstitial lung disease with progressive fibrosis, has had multiple bronchoscopies in the past, no vomiting/diarrhea, no urinary changes, she did notice today she had some swelling in her feet but she was sitting up on the side of the bed a lot today, she denies CHF history though I did see this in her chart, but she is on Lasix daily, she does have a history of A. fib, no headache or neck stiffness, no abdominal pain, normal EF on most recent echo, no other complaints, modifying factors or associated symptoms. I have reviewed the following from the nursing documentation. Past Medical History:   Diagnosis Date    A-fib ([address]     Anxiety     Cryptogenic organizing pneumonia ([facility]     Depression     GERD (gastroesophageal reflux disease)     Hyperlipidemia     On home oxygen therapy     uses O2 3L prn    Rash     Thyroid disease     hypothyroidism     Past Surgical History:   Procedure Laterality Date    ARM SURGERY Left 3/2/2020    LEFT ULNAR NERVE DECOMPRESSION AT THE ELBOW performed by Brooke Stovall MD at [facility] 6/27/2019    EBUS WF W/ANES.  performed by Tim Mancini MD at [address]  6/27/2019    BRONCHOSCOPY/TRANSBRONCHIAL NEEDLE BIOPSY performed by Glendy Marrero MD at [facility]  6/27/2019    BRONCHOSCOPY/TRANSBRONCHIAL LUNG BIOPSY performed by Glendy Marrero MD at [facility]  6/27/2019    BRONCHOSCOPY ALVEOLAR LAVAGE performed by Glendy Marrero MD at [facility] [address]  07/10/2019    BRONCHOSCOPY N/A 7/10/2019    BRONCHOSCOPY ALVEOLAR LAVAGE performed by Kelli Cortes MD at [facility] Bilateral 08/06/2019    BRONCHOSCOPY N/A 8/6/2019    BRONCHOSCOPY ALVEOLAR LAVAGE performed by Raysa Wall MD at [address] [place] [address] N/A 12/24/2019    BRONCHOSCOPY ALVEOLAR LAVAGE performed by Nikkie Baeza MD at [address] [place] [address], TOTAL ABDOMINAL (CERVIX REMOVED)      partial     Family History   Problem Relation Age of Onset    Diabetes Mother     High Blood Pressure Mother     Asthma Sister     Other Father      Social History     Socioeconomic History    Marital status:      Spouse name: Not on file    Number of children: 9    Years of education: Not on file    Highest education level: Not on file   Occupational History    Not on file   Tobacco Use    Smoking status: Never Smoker    Smokeless tobacco: Never Used   Vaping Use    Vaping Use: Never used   Substance and Sexual Activity    Alcohol use: No    Drug use: No    Sexual activity: Not Currently   Other Topics Concern    Not on file   Social History Narrative    Not on file     Social Determinants of Health     Financial Resource Strain:     Difficulty of Paying Living Expenses: Not on file   Food Insecurity:     Worried About Running Out of Food in the Last Year: Not on file    Miguel of Food in the Last Year: Not on file   Transportation Needs:     Lack of Transportation (Medical): Not on file    Lack of Transportation (Non-Medical):  Not on file   Physical Activity:     Days of Exercise per Week: Not on file    Minutes of Exercise per Session: Not on file   Stress:     Feeling of Stress : Not on file   Social Connections:     Frequency of Communication with Friends and Family: Not on file    Frequency of Social Gatherings with Friends and Family: Not on file    Attends Muslim Services: Not on file    Active Member of [employer] or Organizations: Not on file    Attends Club or Organization Meetings: Not on file    Marital Status: Not on file   Intimate Partner Violence:     Fear of Current or Ex-Partner: Not on file    Emotionally Abused: Not on file    Physically Abused: Not on file    Sexually Abused: Not on file   Housing Stability:     Unable to Pay for Housing in the Last Year: Not on file    Number of [place] in the Last Year: Not on file    Unstable Housing in the Last Year: Not on file     No current facility-administered medications for this encounter.      Current Outpatient Medications   Medication Sig Dispense Refill    vitamin B-12 (CYANOCOBALAMIN) 100 MCG tablet Take 50 mcg by mouth daily      ondansetron (ZOFRAN) 4 MG tablet Take 1 tablet by mouth 3 times daily as needed for Nausea or Vomiting 30 tablet 0    fluticasone (FLONASE) 50 MCG/ACT nasal spray 1 spray by Each Nostril route daily 1 each 0    Pirfenidone 267 MG TABS Take 3 tablets by mouth in the morning, at noon, and at bedtime      atorvastatin (LIPITOR) 40 MG tablet Take 1 tablet by mouth daily 90 tablet 1    levothyroxine (SYNTHROID) 125 MCG tablet Take 1 tablet by mouth Daily TAKE 1 TABLET BY MOUTH EVERY DAY 90 tablet 1    sertraline (ZOLOFT) 100 MG tablet Take 2 tablets by mouth daily 180 tablet 1    mirtazapine (REMERON) 30 MG tablet Take 1 tablet by mouth nightly 90 tablet 1    nadolol (CORGARD) 20 MG tablet Take 1 tablet by mouth daily 90 tablet 1    pantoprazole (PROTONIX) 40 MG tablet Take 1 tablet by mouth every morning (before breakfast) 90 tablet 1    empagliflozin (JARDIANCE) 25 MG tablet Take 1 tablet by mouth daily 90 tablet 1    loratadine-pseudoephedrine ([employer] ALLERGY RELIEF-D12) 5-120 MG per extended release tablet TAKE 1 TABLET BY MOUTH TWICE A DAY 60 tablet 5    traMADol (ULTRAM) 50 MG tablet Take 1-2 tablets by mouth every 6 hours as needed for Pain for up to 180 days. Take lowest dose possible to manage pain 240 tablet 5    benzonatate (TESSALON) 200 MG capsule TAKE 1 CAPSULE BY MOUTH 3 TIMES A DAY AS NEEDED FOR COUGH 90 capsule 5    SITagliptin (JANUVIA) 100 MG tablet TAKE 1 TABLET BY MOUTH EVERY DAY 90 tablet 1    docusate sodium (COLACE) 100 MG capsule Take 1 capsule by mouth 2 times daily 20 capsule 0    ferrous sulfate (IRON 325) 325 (65 Fe) MG tablet Take 1 tablet by mouth daily (with breakfast) 30 tablet 3    hydrOXYzine (ATARAX) 10 MG tablet Take 1 tablet by mouth every 8 hours as needed for Itching TAKE 1 TO 3 TABLETS BY MOUTH 3 TIMES DAILY AS NEEDED FOR ITCHING 90 tablet 1    furosemide (LASIX) 40 MG tablet Take 1 tablet by mouth daily 30 tablet 1    acetaminophen (TYLENOL) 325 MG tablet TAKE 3 TABLETS (975 MG TOTAL) BY MOUTH 3 TIMES A DAY AS NEEDED FOR PAIN.  guaiFENesin (MUCINEX) 600 MG extended release tablet Take 1,200 mg by mouth every morning      naloxone 4 MG/0.1ML LIQD nasal spray SPRAY ONCE INTO ONE NOSTRIL IF NEEDED. CALL 911. REPEAT DOSE IN OTHER NOSTRIL IF NO RESPONSE IN 3MIN.  potassium chloride (KLOR-CON) 10 MEQ extended release tablet Take 20 mEq by mouth daily as needed      LORazepam (ATIVAN) 0.5 MG tablet Take 0.5 mg by mouth every 6 hours as needed for Anxiety.       albuterol sulfate  (90 Base) MCG/ACT inhaler INHALE 2 PUFFS INTO THE LUNGS EVERY 6 HOURS AS NEEDED FOR WHEEZING 3 each 1    albuterol (PROVENTIL) (2.5 MG/3ML) 0.083% nebulizer solution INHALE THE CONTENTS OF 1 VIAL VIA NEBULIZER EVERY 6 HOURS AS NEEDED FOR WHEEZING 720 mL 1    Blood Glucose Monitoring Suppl (TRUE METRIX METER) PUJA Test daily and as needed 1 each 0    blood glucose test strips (ASCENSIA AUTODISC VI;ONE TOUCH ULTRA TEST VI) strip 1 each by In Vitro route daily As needed. 100 each 3    TRUEplus Lancets 33G MISC Use daily 100 each 3    Blood Glucose Calibration (TRUE METRIX LEVEL 1) Low SOLN use as needed 1 each 2    sodium chloride (OCEAN, BABY AYR) 0.65 % nasal spray 1 spray by Nasal route as needed for Congestion      INSULIN SYRINGE 1CC/29G 29G X 1/2\" 1 ML MISC 1 each by Does not apply route 2 times daily 100 each 5    Multiple Vitamins-Minerals (MULTIVITAMIN ADULT PO) Take by mouth daily      OXYGEN Inhale 3 L into the lungs       Allergies   Allergen Reactions    Penicillins Anaphylaxis     Tolerates Meropenem.  Cefuroxime      Tolerates Meropenem.  Doxycycline Hives    Imitrex [Sumatriptan] Other (See Comments)     Feels like pins and needles    Meperidine     Other Other (See Comments)     Blood pressure drops, light headed    Sulfa Antibiotics Hives    Keflex [Cephalexin] Itching and Rash     Tolerates Meropenem.  Tape Jeaneen Eastern Tape] Rash       REVIEW OF SYSTEMS  10 systems reviewed, pertinent positives per HPI otherwise noted to be negative. PHYSICAL EXAM    BP (!) 134/97   Pulse 97   Temp 98.3 °F (36.8 °C) (Oral)   Resp 21   Ht 5' 3\" (1.6 m)   Wt 132 lb (59.9 kg)   SpO2 97%   BMI 23.38 kg/m²   GENERAL APPEARANCE: Awake and alert. Cooperative. No acute distress, chronically ill-appearing, laying on side hunched over in the bed  HEAD: Normocephalic. Atraumatic. No perry's sign. EYES: PERRL. EOM's grossly intact. No scleral icterus. No drainage. No periorbital ecchymosis. ENT: Mucous membranes are tacky. Airway patent. No stridor. No epistaxis. No otorrhea or rhinorrhea. No exudates, midline uvula  NECK/back: Supple. No rigidity, midline tenderness lower lumbar region, thoracic kyphosis with cervical lordosis  HEART: RRR.  No murmurs  LUNGS: Respirations slightly labored, tachypneic around 20, lungs are with decreased breath sounds, minimal wheezing, but notable crackles in the bases, few scattered rhonchi, harsh hacking cough  ABDOMEN: Soft. Non-distended. Non-tender. No guarding, no rebound tenderness, no rigidity. Normal bowel sounds. No McBurney's point tenderness, negative Rovsing's sign, negative Kothari's sign  EXTREMITIES: Moves all extremities equally. All extremities neurovascularly intact. No obvious deformities. Bilateral pedal edema, pitting, indentions from her shoes, but no significant lower leg edema  SKIN: Warm and dry. No acute rashes. NEUROLOGICAL: Alert and oriented x4. No gross facial drooping. Strength 5/5, sensation intact. No truncal ataxia. Normal speech  PSYCHIATRIC: Normal mood and affect. LABS  I have reviewed all labs for this visit.    Results for orders placed or performed during the hospital encounter of 07/04/22   Rapid influenza A/B antigens    Specimen: Nasopharyngeal   Result Value Ref Range    Rapid Influenza A Ag Negative Negative    Rapid Influenza B Ag Negative Negative   COVID-19, Rapid    Specimen: Nasopharyngeal Swab   Result Value Ref Range    SARS-CoV-2, NAAT Not Detected Not Detected   CBC with Auto Differential   Result Value Ref Range    WBC 9.6 4.0 - 11.0 K/uL    RBC 4.62 4.00 - 5.20 M/uL    Hemoglobin 12.0 12.0 - 16.0 g/dL    Hematocrit 37.5 36.0 - 48.0 %    MCV 81.2 80.0 - 100.0 fL    MCH 26.0 26.0 - 34.0 pg    MCHC 32.0 31.0 - 36.0 g/dL    RDW 18.2 (H) 12.4 - 15.4 %    Platelets 742 410 - 486 K/uL    MPV 7.3 5.0 - 10.5 fL    Neutrophils % 69.3 %    Lymphocytes % 14.7 %    Monocytes % 6.6 %    Eosinophils % 8.4 %    Basophils % 1.0 %    Neutrophils Absolute 6.6 1.7 - 7.7 K/uL    Lymphocytes Absolute 1.4 1.0 - 5.1 K/uL    Monocytes Absolute 0.6 0.0 - 1.3 K/uL    Eosinophils Absolute 0.8 (H) 0.0 - 0.6 K/uL    Basophils Absolute 0.1 0.0 - 0.2 K/uL   Comprehensive Metabolic Panel w/ Reflex to MG   Result Value Ref Range    Sodium 141 136 - 145 mmol/L Potassium reflex Magnesium 3.4 (L) 3.5 - 5.1 mmol/L    Chloride 98 (L) 99 - 110 mmol/L    CO2 30 21 - 32 mmol/L    Anion Gap 13 3 - 16    Glucose 110 (H) 70 - 99 mg/dL    BUN 10 7 - 20 mg/dL    CREATININE <0.5 (L) 0.6 - 1.2 mg/dL    GFR Non-African American >60 >60    GFR African American >60 >60    Calcium 9.6 8.3 - 10.6 mg/dL    Total Protein 7.5 6.4 - 8.2 g/dL    Albumin 3.6 3.4 - 5.0 g/dL    Albumin/Globulin Ratio 0.9 (L) 1.1 - 2.2    Total Bilirubin 0.5 0.0 - 1.0 mg/dL    Alkaline Phosphatase 141 (H) 40 - 129 U/L    ALT 15 10 - 40 U/L    AST 36 15 - 37 U/L   Troponin   Result Value Ref Range    Troponin <0.01 <0.01 ng/mL   Brain Natriuretic Peptide   Result Value Ref Range    Pro- (H) 0 - 124 pg/mL   Blood Gas, Venous   Result Value Ref Range    pH, [place] 7.408 7.350 - 7.450    pCO2, [place] 54.5 (H) 40.0 - 50.0 mmHg    pO2, [place] 35.1 25.0 - 40.0 mmHg    HCO3, Venous 33.6 (H) 23.0 - 29.0 mmol/L    Base Excess, [place] 7.3 (H) -3.0 - 3.0 mmol/L    O2 Sat, [place] 67 Not Established %    Carboxyhemoglobin 3.0 (H) 0.0 - 1.5 %    MetHgb, [place] 0.3 <1.5 %    TC02 (Calc), [place] 35 Not Established mmol/L    O2 Therapy Unknown    Magnesium   Result Value Ref Range    Magnesium 1.80 1.80 - 2.40 mg/dL       EKG  EKG interpretation by me: Normal sinus rhythm, rate 90, QTc 479, LVH findings, nonspecific ST/T wave changes, no significant elevations or depressions    RADIOLOGY  XR CHEST PORTABLE    Result Date: 7/4/2022  Chronic lung disease. Evaluation for superimposed pneumonia is challenging. There may be slightly increased opacity at the right lung base when compared to prior exams. ED COURSE/MDM  Patient seen and evaluated. Old records reviewed. Labs and imaging reviewed and results discussed with patient.      69 y.o. female with progressive interstitial lung disease, she did seem to improve a little after the nebs and steroids, but she reports she does not have COPD, she does appear mildly fluid overloaded, trying Lasix, though only mild elevation of BNP compared to last episode, tachypnea and hypoxic episodes likely related to this, the tachycardia is likely related to the albuterol neb treatments, she did have some prior to arrival as well, and replacing mild hypokalemia, she diuresed well and was feeling significantly better, discussed admission but she preferred to go home, and I feel she is safe to do so, has her home oxygen, has everything she needs that she did request a refill for Zofran and Flonase that she had run out of, not able to call her doctor's office until tomorrow due to the holiday,  here to take her home, we discussed what typically happens when she is discharged from the hospital, she states that she is not typically treated like a COPD exacerbation, especially with her diabetes I do not feel that she requires home steroids and antibiotics at this time, I have low suspicion for pneumonia/sepsis at this time, but she was encouraged to return if any new or worsening symptoms, she states she needs to follow-up with her pulmonologist soon for a pulmonary clearance for surgery, she understands that she would be high risk for this from a lung perspective, no current suspicion for ACS/PE/dissection at this time, strict return precautions given, all questions answered, will return if any worsening symptoms or new concerns, see AVS for further discharge information, patient verbalized understanding of plan, felt comfortable going home.      Orders Placed This Encounter   Procedures    Rapid influenza A/B antigens    COVID-19, Rapid    Culture, Respiratory    XR CHEST PORTABLE    CBC with Auto Differential    Comprehensive Metabolic Panel w/ Reflex to MG    Troponin    Brain Natriuretic Peptide    Blood Gas, Venous    Magnesium    Initiate Oxygen Therapy Protocol    EKG 12 Lead     Orders Placed This Encounter   Medications    ipratropium-albuterol (DUONEB) nebulizer solution 3 mL     Order Specific Question:   Initiate RT Bronchodilator Protocol     Answer:   No    methylPREDNISolone sodium (SOLU-MEDROL) injection 60 mg    furosemide (LASIX) injection 60 mg    DISCONTD: potassium bicarb-citric acid (EFFER-K) effervescent tablet 40 mEq    potassium chloride (KLOR-CON M) extended release tablet 20 mEq    ondansetron (ZOFRAN) 4 MG tablet     Sig: Take 1 tablet by mouth 3 times daily as needed for Nausea or Vomiting     Dispense:  30 tablet     Refill:  0    fluticasone (FLONASE) 50 MCG/ACT nasal spray     Si spray by Each Nostril route daily     Dispense:  1 each     Refill:  0     ED Course as of 22 0624      035 Results discussed with patient, explained this does not seem to be infectious, we did send sputum culture just in case, but white count is normal, afebrile and she is not having fevers at home, and last time she had an episode like this she did well with diuresis, has been swelling more than normal so we will give 60 mg IV Lasix and reevaluate, she would prefer to go home if possible, but she is desatting with minimal exertion or coughing spells.  now at bedside [SY]   [address] much better, she has had 1250 cc of urine output. [SY]      ED Course User Index  [SY] Curtis Rogers, DO       Is this patient to be included in the SEP-1 Core Measure due to severe sepsis or septic shock? No   Exclusion criteria - the patient is NOT to be included for SEP-1 Core Measure due to: Alternative explanation for abnormal labs/vitals that do not relate to sepsis, see MDM for further explanation    The total critical care time spent while evaluating and treating this patient was 31 minutes. This excludes time spent doing separately billable procedures. This includes time at the bedside, data interpretation, medication management, obtaining critical history from collateral sources if the patient is unable to provide it directly, and physician consultation. Specifics of interventions taken and potentially life-threatening diagnostic considerations are listed in the medical decision making. CLINICAL IMPRESSION  1. Dyspnea and respiratory abnormalities    2. ILD (interstitial lung disease) ([address]    3. Hypervolemia, unspecified hypervolemia type    4. Hypokalemia    5. Viral URI        Blood pressure (!) 134/97, pulse 97, temperature 98.3 °F (36.8 °C), temperature source Oral, resp. rate 21, height 5' 3\" (1.6 m), weight 132 lb (59.9 kg), SpO2 97 %, not currently breastfeeding. 39 [address] Rodriguez was discharged to home in stable condition.                    Kathryn Barker,   07/04/22 6752

## 2022-07-06 ENCOUNTER — TELEPHONE (OUTPATIENT)
Dept: FAMILY MEDICINE CLINIC | Age: 67
End: 2022-07-06

## 2022-07-06 ENCOUNTER — APPOINTMENT (OUTPATIENT)
Dept: GENERAL RADIOLOGY | Age: 67
End: 2022-07-06
Payer: MEDICARE

## 2022-07-06 ENCOUNTER — HOSPITAL ENCOUNTER (EMERGENCY)
Age: 67
Discharge: HOME | End: 2022-07-07
Attending: EMERGENCY MEDICINE
Payer: MEDICARE

## 2022-07-06 VITALS
BODY MASS INDEX: 23.39 KG/M2 | RESPIRATION RATE: 16 BRPM | HEIGHT: 63 IN | OXYGEN SATURATION: 100 % | TEMPERATURE: 98.2 F | DIASTOLIC BLOOD PRESSURE: 82 MMHG | SYSTOLIC BLOOD PRESSURE: 117 MMHG | WEIGHT: 132 LBS | HEART RATE: 101 BPM

## 2022-07-06 DIAGNOSIS — G89.29 CHRONIC LOW BACK PAIN WITHOUT SCIATICA, UNSPECIFIED BACK PAIN LATERALITY: Primary | ICD-10-CM

## 2022-07-06 DIAGNOSIS — M54.50 CHRONIC LOW BACK PAIN WITHOUT SCIATICA, UNSPECIFIED BACK PAIN LATERALITY: Primary | ICD-10-CM

## 2022-07-06 LAB
CULTURE, RESPIRATORY: NORMAL
GRAM STAIN RESULT: NORMAL

## 2022-07-06 PROCEDURE — 6370000000 HC RX 637 (ALT 250 FOR IP): Performed by: EMERGENCY MEDICINE

## 2022-07-06 PROCEDURE — 99284 EMERGENCY DEPT VISIT MOD MDM: CPT

## 2022-07-06 PROCEDURE — 72100 X-RAY EXAM L-S SPINE 2/3 VWS: CPT

## 2022-07-06 PROCEDURE — 73130 X-RAY EXAM OF HAND: CPT

## 2022-07-06 RX ORDER — OXYCODONE HYDROCHLORIDE AND ACETAMINOPHEN 5; 325 MG/1; MG/1
1 TABLET ORAL ONCE
Status: COMPLETED | OUTPATIENT
Start: 2022-07-06 | End: 2022-07-06

## 2022-07-06 RX ADMIN — OXYCODONE HYDROCHLORIDE AND ACETAMINOPHEN 1 TABLET: 5; 325 TABLET ORAL at 22:29

## 2022-07-06 ASSESSMENT — PAIN SCALES - GENERAL: PAINLEVEL_OUTOF10: 0

## 2022-07-06 ASSESSMENT — PAIN DESCRIPTION - PAIN TYPE: TYPE: ACUTE PAIN

## 2022-07-06 ASSESSMENT — PAIN - FUNCTIONAL ASSESSMENT
PAIN_FUNCTIONAL_ASSESSMENT: NONE - DENIES PAIN
PAIN_FUNCTIONAL_ASSESSMENT: PREVENTS OR INTERFERES SOME ACTIVE ACTIVITIES AND ADLS

## 2022-07-07 ASSESSMENT — PAIN - FUNCTIONAL ASSESSMENT: PAIN_FUNCTIONAL_ASSESSMENT: NONE - DENIES PAIN

## 2022-07-07 NOTE — ED PROVIDER NOTES
Emergency Department Attending Note    Isra Sandhu MD    Date of ED VIsit: 7/6/2022    CHIEF COMPLAINT  Fall (patient reports she was laying in her bed watching her tablet, fell asleep, had a weird dream where she was fighting people and woke up on the floor on her hands and knees. She c/o pain all over and SOB. )      HISTORY OF PRESENT ILLNESS  Mita Zhou is a 69 y.o. female  With Vital signs of /88   Pulse (!) 108   Temp 98.2 °F (36.8 °C) (Oral)   Resp 20   Ht 5' 3\" (1.6 m)   Wt 132 lb (59.9 kg)   BMI 23.38 kg/m²  who presents to the ED with a complaint of lower back pain after fall. Patient seen and evaluated in room 7. The patient with a lumbar radiculopathy who was apparently in bed and fell out of bed and struck her back on the floor and is now complaining of lumbar back pain worse than her usual back pain. She does not report any numbness or tingling in her legs. She can move her legs and her feet without any problems. She is supposed to wear a brace for her lumbar spine. And is currently about to undergo some kind of surgery for her lower back as well. She has no other injuries she did not strike her head there was no loss consciousness. No bowel or bladder symptoms. .  No other complaints, modifying factors or associated symptoms. No other complaints including shortness of breath    Patients Past medical history reviewed and listed below  Past Medical History:   Diagnosis Date    [facility]     Anxiety     Cryptogenic organizing pneumonia ([facility]     Depression     GERD (gastroesophageal reflux disease)     Hyperlipidemia     On home oxygen therapy     uses O2 3L prn    Rash     Thyroid disease     hypothyroidism     Past Surgical History:   Procedure Laterality Date    ARM SURGERY Left 3/2/2020    LEFT ULNAR NERVE DECOMPRESSION AT THE ELBOW performed by Sylvain Pacheco MD at [address] 6/27/2019    EBUS WF W/BECCA.  performed by Beth Mix MD at [place] [address]  6/27/2019    BRONCHOSCOPY/TRANSBRONCHIAL NEEDLE BIOPSY performed by Marquis Radha MD at [place] [address]  6/27/2019    BRONCHOSCOPY/TRANSBRONCHIAL LUNG BIOPSY performed by Marquis Radha MD at [place] [address]  6/27/2019    BRONCHOSCOPY ALVEOLAR LAVAGE performed by Marquis Radha MD at [place] [address]  07/10/2019    BRONCHOSCOPY N/A 7/10/2019    BRONCHOSCOPY ALVEOLAR LAVAGE performed by Jessica Alvarado MD at [place] [address] Bilateral 08/06/2019    BRONCHOSCOPY N/A 8/6/2019    BRONCHOSCOPY ALVEOLAR LAVAGE performed by Jose M Estrella MD at [place] [address] N/A 12/24/2019    BRONCHOSCOPY ALVEOLAR LAVAGE performed by Joan Zhang MD at [address] [place] [address], TOTAL ABDOMINAL (CERVIX REMOVED)      partial       I have reviewed the following from the nursing documentation.     Family History   Problem Relation Age of Onset    Diabetes Mother     High Blood Pressure Mother     Asthma Sister     Other Father      Social History     Socioeconomic History    Marital status:      Spouse name: Not on file    Number of children: 9    Years of education: Not on file    Highest education level: Not on file   Occupational History    Not on file   Tobacco Use    Smoking status: Never Smoker    Smokeless tobacco: Never Used   Vaping Use    Vaping Use: Never used   Substance and Sexual Activity    Alcohol use: No    Drug use: No    Sexual activity: Not Currently   Other Topics Concern    Not on file   Social History Narrative    Not on file     Social Determinants of Health     Financial Resource Strain:     Difficulty of Paying Living Expenses: Not on file   Food Insecurity:     Worried About Running Out of Food in the Last Year: Not on file    Miguel of Food in the Last Year: Not on file   Transportation Needs:     Lack of Transportation (Medical): Not on file    Lack of Transportation (Non-Medical):  Not on file   Physical Activity:     Days of Exercise per Week: Not on file    Minutes of Exercise per Session: Not on file   Stress:     Feeling of Stress : Not on file   Social Connections:     Frequency of Communication with Friends and Family: Not on file    Frequency of Social Gatherings with Friends and Family: Not on file    Attends Evangelical Services: Not on file    Active Member of [address] [place] [address] or Organizations: Not on file    Attends Club or Organization Meetings: Not on file    Marital Status: Not on file   Intimate Partner Violence:     Fear of Current or Ex-Partner: Not on file    Emotionally Abused: Not on file    Physically Abused: Not on file    Sexually Abused: Not on file   Housing Stability:     Unable to Pay for Housing in the Last Year: Not on file    Number of [place] in the Last Year: Not on file    Unstable Housing in the Last Year: Not on file     Current Facility-Administered Medications   Medication Dose Route Frequency Provider Last Rate Last Admin    oxyCODONE-acetaminophen (PERCOCET) 5-325 MG per tablet 1 tablet  1 tablet Oral Once Allan Padron MD         Current Outpatient Medications   Medication Sig Dispense Refill    Pirfenidone 267 MG TABS Take 3 tablets by mouth in the morning, at noon, and at bedtime      atorvastatin (LIPITOR) 40 MG tablet Take 1 tablet by mouth daily 90 tablet 1    levothyroxine (SYNTHROID) 125 MCG tablet Take 1 tablet by mouth Daily TAKE 1 TABLET BY MOUTH EVERY DAY 90 tablet 1    sertraline (ZOLOFT) 100 MG tablet Take 2 tablets by mouth daily 180 tablet 1    mirtazapine (REMERON) 30 MG tablet Take 1 tablet by mouth nightly 90 tablet 1    nadolol (CORGARD) 20 MG tablet Take 1 tablet by mouth daily 90 tablet 1    pantoprazole (PROTONIX) 40 MG tablet Take 1 tablet by mouth every morning (before breakfast) 90 tablet 1    empagliflozin (JARDIANCE) 25 MG tablet Take 1 tablet by mouth daily 90 tablet 1    loratadine-pseudoephedrine ([employer] ALLERGY RELIEF-D12) 5-120 MG per extended release tablet TAKE 1 TABLET BY MOUTH TWICE A DAY 60 tablet 5    traMADol (ULTRAM) 50 MG tablet Take 1-2 tablets by mouth every 6 hours as needed for Pain for up to 180 days. Take lowest dose possible to manage pain 240 tablet 5    benzonatate (TESSALON) 200 MG capsule TAKE 1 CAPSULE BY MOUTH 3 TIMES A DAY AS NEEDED FOR COUGH 90 capsule 5    SITagliptin (JANUVIA) 100 MG tablet TAKE 1 TABLET BY MOUTH EVERY DAY 90 tablet 1    ferrous sulfate (IRON 325) 325 (65 Fe) MG tablet Take 1 tablet by mouth daily (with breakfast) 30 tablet 3    hydrOXYzine (ATARAX) 10 MG tablet Take 1 tablet by mouth every 8 hours as needed for Itching TAKE 1 TO 3 TABLETS BY MOUTH 3 TIMES DAILY AS NEEDED FOR ITCHING 90 tablet 1    furosemide (LASIX) 40 MG tablet Take 1 tablet by mouth daily 30 tablet 1    acetaminophen (TYLENOL) 325 MG tablet TAKE 3 TABLETS (975 MG TOTAL) BY MOUTH 3 TIMES A DAY AS NEEDED FOR PAIN.  guaiFENesin (MUCINEX) 600 MG extended release tablet Take 1,200 mg by mouth every morning      naloxone 4 MG/0.1ML LIQD nasal spray SPRAY ONCE INTO ONE NOSTRIL IF NEEDED. CALL 911. REPEAT DOSE IN OTHER NOSTRIL IF NO RESPONSE IN 3MIN.  potassium chloride (KLOR-CON) 10 MEQ extended release tablet Take 20 mEq by mouth daily as needed      LORazepam (ATIVAN) 0.5 MG tablet Take 0.5 mg by mouth every 6 hours as needed for Anxiety.       albuterol sulfate  (90 Base) MCG/ACT inhaler INHALE 2 PUFFS INTO THE LUNGS EVERY 6 HOURS AS NEEDED FOR WHEEZING 3 each 1    albuterol (PROVENTIL) (2.5 MG/3ML) 0.083% nebulizer solution INHALE THE CONTENTS OF 1 VIAL VIA NEBULIZER EVERY 6 HOURS AS NEEDED FOR WHEEZING 720 mL 1    Blood Glucose Monitoring Suppl (TRUE METRIX METER) PUJA Test daily and as needed 1 each 0    blood glucose test strips (ASCENSIA AUTODISC VI;ONE TOUCH ULTRA TEST VI) strip 1 each by In Vitro route daily As needed. 100 each 3    TRUEplus Lancets 33G MISC Use daily 100 each 3    Blood Glucose Calibration (TRUE METRIX LEVEL 1) Low SOLN use as needed 1 each 2    INSULIN SYRINGE 1CC/29G 29G X 1/2\" 1 ML MISC 1 each by Does not apply route 2 times daily 100 each 5    OXYGEN Inhale 3 L into the lungs       Allergies   Allergen Reactions    Penicillins Anaphylaxis     Tolerates Meropenem.  Cefuroxime      Tolerates Meropenem.  Doxycycline Hives    Imitrex [Sumatriptan] Other (See Comments)     Feels like pins and needles    Meperidine     Other Other (See Comments)     Blood pressure drops, light headed    Sulfa Antibiotics Hives    Keflex [Cephalexin] Itching and Rash     Tolerates Meropenem.  Tape Ricke Guard Tape] Rash       REVIEW OF SYSTEMS  10 systems reviewed, pertinent positives per HPI otherwise noted to be negative    PHYSICAL EXAM  /88   Pulse (!) 108   Temp 98.2 °F (36.8 °C) (Oral)   Resp 20   Ht 5' 3\" (1.6 m)   Wt 132 lb (59.9 kg)   BMI 23.38 kg/m²   GENERAL APPEARANCE: Awake and alert. Cooperative. In moderate distress. HEAD: Normocephalic. Atraumatic. EYES: PERRL. EOM's grossly intact. ENT: Mucous membranes are pink and moist.   NECK: Supple. HEART: RRR. No murmurs. BACK: Patient has pain over the midline lower back which is inconsistent during examination  LUNGS: Respirations unlabored. CTAB. Good air exchange. ABDOMEN: Soft. Non-distended. Non-tender. No masses. No organomegaly. No guarding or rebound. EXTREMITIES: No peripheral edema. Moves all extremities equally. All extremities neurovascularly intact. SKIN: Warm and dry. No acute rashes. NEUROLOGICAL: Alert and oriented. She is neurologically intact in the lower extremities. . Strength 5/5, sensation intact. Gait normal.   PSYCHIATRIC: Normal mood and affect. No HI or SI expressed to me.       RADIOLOGY    If acquired see below     EKG:     If acquired see below       ED COURSE/MDM    Patient had no new fractures with the exception of a possible acute or subacute left 12th rib fracture. She has pain medicine at home that she is going to take. She had no pneumothorax on radiograph. So she will be discharged to follow-up with her primary care physician    ED Course as of 07/06/22 2352 Wed Jul 06, 2022 2350 XR HAND LEFT (MIN 3 VIEWS)  IMPRESSION:  Osteopenia. No evident fracture. [DL]   46 IMPRESSION:  Acute or subacute left posterior 12th rib fracture.     Question of a left L1 transverse process fracture.     The L1 fracture appears similar to the prior exam. [DL]      ED Course User Index  [DL] Hill Garcia MD       The ED course and plan were reviewed and results discussed with the patient    The patient understood and agreed with the Discharge/transfer planning.     CLINICAL IMPRESSION and DISPOSITION    William Frame was stable and diagnosed with fall    Patient was treated with Silver Mcgowan MD  07/06/22 5121

## 2022-07-08 ENCOUNTER — TELEPHONE (OUTPATIENT)
Dept: FAMILY MEDICINE CLINIC | Age: 67
End: 2022-07-08

## 2022-07-08 NOTE — TELEPHONE ENCOUNTER
Kathryn from [facility] called to inform you that the patient fell out of bed Wednesday night. Ambulance called and taken to an ER and evaluated. No injuries and was sent home. She is only have a lot of back pain and is taking Tramadol 50 mg for the pain.

## 2022-07-13 ENCOUNTER — TELEPHONE (OUTPATIENT)
Dept: PULMONOLOGY | Age: 67
End: 2022-07-13

## 2022-07-13 RX ORDER — ALBUTEROL SULFATE 90 UG/1
2 AEROSOL, METERED RESPIRATORY (INHALATION) EVERY 6 HOURS PRN
Qty: 3 EACH | Refills: 1 | Status: SHIPPED | OUTPATIENT
Start: 2022-07-13

## 2022-07-13 NOTE — TELEPHONE ENCOUNTER
Pt called stating her ankles and calf are swollen and tight. Pt states she has a knot of the left side of her leg under her knee cap. Pt states this has never happened before. Please advise.

## 2022-07-13 NOTE — TELEPHONE ENCOUNTER
Per dr. Charmel Osgood zoby advise pt to see her PCP, if she cannot be seen by tomorrow, proceed to the ER for evaluation. Called pt and informed her of dr. Charmel Osgood zoby's response. Pt verbalized understanding. Pt stated she took a water pill to try to help get some of the fluid off.

## 2022-07-14 ENCOUNTER — HOSPITAL ENCOUNTER (INPATIENT)
Age: 67
LOS: 3 days | Discharge: HOME HEALTH | DRG: 196 | End: 2022-07-17
Attending: STUDENT IN AN ORGANIZED HEALTH CARE EDUCATION/TRAINING PROGRAM | Admitting: INTERNAL MEDICINE
Payer: MEDICARE

## 2022-07-14 ENCOUNTER — APPOINTMENT (OUTPATIENT)
Dept: GENERAL RADIOLOGY | Age: 67
DRG: 196 | End: 2022-07-14
Payer: MEDICARE

## 2022-07-14 DIAGNOSIS — E87.6 HYPOKALEMIA: ICD-10-CM

## 2022-07-14 DIAGNOSIS — J96.21 ACUTE ON CHRONIC RESPIRATORY FAILURE WITH HYPOXIA (HCC): ICD-10-CM

## 2022-07-14 DIAGNOSIS — I50.9 ACUTE CONGESTIVE HEART FAILURE, UNSPECIFIED HEART FAILURE TYPE (HCC): Primary | ICD-10-CM

## 2022-07-14 PROBLEM — J84.9 INTERSTITIAL LUNG DISEASE (HCC): Status: ACTIVE | Noted: 2022-07-14

## 2022-07-14 LAB
A/G RATIO: 0.7 (ref 1.1–2.2)
ALBUMIN SERPL-MCNC: 3.3 G/DL (ref 3.4–5)
ALP BLD-CCNC: 138 U/L (ref 40–129)
ALT SERPL-CCNC: 11 U/L (ref 10–40)
ANION GAP SERPL CALCULATED.3IONS-SCNC: 12 MMOL/L (ref 3–16)
AST SERPL-CCNC: 26 U/L (ref 15–37)
BASE EXCESS VENOUS: 10.5 MMOL/L (ref -3–3)
BASOPHILS ABSOLUTE: 0.1 K/UL (ref 0–0.2)
BASOPHILS RELATIVE PERCENT: 0.9 %
BILIRUB SERPL-MCNC: 0.9 MG/DL (ref 0–1)
BUN BLDV-MCNC: 9 MG/DL (ref 7–20)
CALCIUM SERPL-MCNC: 9 MG/DL (ref 8.3–10.6)
CARBOXYHEMOGLOBIN: 2.5 % (ref 0–1.5)
CHLORIDE BLD-SCNC: 92 MMOL/L (ref 99–110)
CO2: 34 MMOL/L (ref 21–32)
CREAT SERPL-MCNC: 0.7 MG/DL (ref 0.6–1.2)
EKG ATRIAL RATE: 88 BPM
EKG DIAGNOSIS: NORMAL
EKG P AXIS: 42 DEGREES
EKG P-R INTERVAL: 160 MS
EKG Q-T INTERVAL: 390 MS
EKG QRS DURATION: 84 MS
EKG QTC CALCULATION (BAZETT): 471 MS
EKG R AXIS: -8 DEGREES
EKG T AXIS: 105 DEGREES
EKG VENTRICULAR RATE: 88 BPM
EOSINOPHILS ABSOLUTE: 0.8 K/UL (ref 0–0.6)
EOSINOPHILS RELATIVE PERCENT: 6.9 %
GFR AFRICAN AMERICAN: >60
GFR NON-AFRICAN AMERICAN: >60
GLUCOSE BLD-MCNC: 108 MG/DL (ref 70–99)
GLUCOSE BLD-MCNC: 93 MG/DL (ref 70–99)
HCO3 VENOUS: 38.3 MMOL/L (ref 23–29)
HCT VFR BLD CALC: 38.8 % (ref 36–48)
HEMOGLOBIN: 12.5 G/DL (ref 12–16)
LYMPHOCYTES ABSOLUTE: 1.6 K/UL (ref 1–5.1)
LYMPHOCYTES RELATIVE PERCENT: 14.3 %
MAGNESIUM: 1.5 MG/DL (ref 1.8–2.4)
MCH RBC QN AUTO: 26 PG (ref 26–34)
MCHC RBC AUTO-ENTMCNC: 32.2 G/DL (ref 31–36)
MCV RBC AUTO: 80.9 FL (ref 80–100)
METHEMOGLOBIN VENOUS: 0.3 %
MONOCYTES ABSOLUTE: 0.7 K/UL (ref 0–1.3)
MONOCYTES RELATIVE PERCENT: 6.7 %
NEUTROPHILS ABSOLUTE: 7.7 K/UL (ref 1.7–7.7)
NEUTROPHILS RELATIVE PERCENT: 71.2 %
O2 SAT, VEN: 58 %
O2 THERAPY: ABNORMAL
PCO2, VEN: 65 MMHG (ref 40–50)
PDW BLD-RTO: 16.5 % (ref 12.4–15.4)
PERFORMED ON: NORMAL
PH VENOUS: 7.39 (ref 7.35–7.45)
PLATELET # BLD: 251 K/UL (ref 135–450)
PMV BLD AUTO: 7.4 FL (ref 5–10.5)
PO2, VEN: 31.6 MMHG (ref 25–40)
POTASSIUM REFLEX MAGNESIUM: 2.7 MMOL/L (ref 3.5–5.1)
PRO-BNP: 832 PG/ML (ref 0–124)
RAPID INFLUENZA  B AGN: NEGATIVE
RAPID INFLUENZA A AGN: NEGATIVE
RBC # BLD: 4.8 M/UL (ref 4–5.2)
SARS-COV-2, NAAT: NOT DETECTED
SODIUM BLD-SCNC: 138 MMOL/L (ref 136–145)
TCO2 CALC VENOUS: 40 MMOL/L
TOTAL PROTEIN: 7.9 G/DL (ref 6.4–8.2)
TROPONIN: <0.01 NG/ML
WBC # BLD: 10.9 K/UL (ref 4–11)

## 2022-07-14 PROCEDURE — 93005 ELECTROCARDIOGRAM TRACING: CPT | Performed by: STUDENT IN AN ORGANIZED HEALTH CARE EDUCATION/TRAINING PROGRAM

## 2022-07-14 PROCEDURE — 6370000000 HC RX 637 (ALT 250 FOR IP): Performed by: STUDENT IN AN ORGANIZED HEALTH CARE EDUCATION/TRAINING PROGRAM

## 2022-07-14 PROCEDURE — 99285 EMERGENCY DEPT VISIT HI MDM: CPT

## 2022-07-14 PROCEDURE — 83735 ASSAY OF MAGNESIUM: CPT

## 2022-07-14 PROCEDURE — 93010 ELECTROCARDIOGRAM REPORT: CPT | Performed by: INTERNAL MEDICINE

## 2022-07-14 PROCEDURE — 71045 X-RAY EXAM CHEST 1 VIEW: CPT

## 2022-07-14 PROCEDURE — 2060000000 HC ICU INTERMEDIATE R&B

## 2022-07-14 PROCEDURE — 2580000003 HC RX 258: Performed by: INTERNAL MEDICINE

## 2022-07-14 PROCEDURE — 87804 INFLUENZA ASSAY W/OPTIC: CPT

## 2022-07-14 PROCEDURE — 36415 COLL VENOUS BLD VENIPUNCTURE: CPT

## 2022-07-14 PROCEDURE — 80053 COMPREHEN METABOLIC PANEL: CPT

## 2022-07-14 PROCEDURE — 83880 ASSAY OF NATRIURETIC PEPTIDE: CPT

## 2022-07-14 PROCEDURE — 84484 ASSAY OF TROPONIN QUANT: CPT

## 2022-07-14 PROCEDURE — 6370000000 HC RX 637 (ALT 250 FOR IP): Performed by: INTERNAL MEDICINE

## 2022-07-14 PROCEDURE — 6360000002 HC RX W HCPCS: Performed by: INTERNAL MEDICINE

## 2022-07-14 PROCEDURE — 6360000002 HC RX W HCPCS: Performed by: STUDENT IN AN ORGANIZED HEALTH CARE EDUCATION/TRAINING PROGRAM

## 2022-07-14 PROCEDURE — 85025 COMPLETE CBC W/AUTO DIFF WBC: CPT

## 2022-07-14 PROCEDURE — 87635 SARS-COV-2 COVID-19 AMP PRB: CPT

## 2022-07-14 PROCEDURE — 82803 BLOOD GASES ANY COMBINATION: CPT

## 2022-07-14 PROCEDURE — 96365 THER/PROPH/DIAG IV INF INIT: CPT

## 2022-07-14 RX ORDER — POTASSIUM CHLORIDE 750 MG/1
40 TABLET, EXTENDED RELEASE ORAL ONCE
Status: COMPLETED | OUTPATIENT
Start: 2022-07-14 | End: 2022-07-14

## 2022-07-14 RX ORDER — HYDROXYZINE HYDROCHLORIDE 10 MG/1
10 TABLET, FILM COATED ORAL EVERY 8 HOURS PRN
Status: DISCONTINUED | OUTPATIENT
Start: 2022-07-14 | End: 2022-07-17 | Stop reason: HOSPADM

## 2022-07-14 RX ORDER — MAGNESIUM SULFATE IN WATER 40 MG/ML
2000 INJECTION, SOLUTION INTRAVENOUS ONCE
Status: COMPLETED | OUTPATIENT
Start: 2022-07-14 | End: 2022-07-15

## 2022-07-14 RX ORDER — ENOXAPARIN SODIUM 100 MG/ML
40 INJECTION SUBCUTANEOUS DAILY
Status: DISCONTINUED | OUTPATIENT
Start: 2022-07-15 | End: 2022-07-17 | Stop reason: HOSPADM

## 2022-07-14 RX ORDER — SODIUM CHLORIDE 9 MG/ML
INJECTION, SOLUTION INTRAVENOUS PRN
Status: DISCONTINUED | OUTPATIENT
Start: 2022-07-14 | End: 2022-07-17 | Stop reason: HOSPADM

## 2022-07-14 RX ORDER — ATORVASTATIN CALCIUM 40 MG/1
40 TABLET, FILM COATED ORAL DAILY
Status: DISCONTINUED | OUTPATIENT
Start: 2022-07-15 | End: 2022-07-17 | Stop reason: HOSPADM

## 2022-07-14 RX ORDER — ACETAMINOPHEN 650 MG/1
650 SUPPOSITORY RECTAL EVERY 6 HOURS PRN
Status: DISCONTINUED | OUTPATIENT
Start: 2022-07-14 | End: 2022-07-17 | Stop reason: HOSPADM

## 2022-07-14 RX ORDER — PANTOPRAZOLE SODIUM 40 MG/1
40 TABLET, DELAYED RELEASE ORAL
Status: DISCONTINUED | OUTPATIENT
Start: 2022-07-15 | End: 2022-07-17 | Stop reason: HOSPADM

## 2022-07-14 RX ORDER — TRAMADOL HYDROCHLORIDE 50 MG/1
50 TABLET ORAL EVERY 6 HOURS PRN
Status: DISCONTINUED | OUTPATIENT
Start: 2022-07-14 | End: 2022-07-17 | Stop reason: HOSPADM

## 2022-07-14 RX ORDER — POLYETHYLENE GLYCOL 3350 17 G/17G
17 POWDER, FOR SOLUTION ORAL DAILY PRN
Status: DISCONTINUED | OUTPATIENT
Start: 2022-07-14 | End: 2022-07-17 | Stop reason: HOSPADM

## 2022-07-14 RX ORDER — MIRTAZAPINE 30 MG/1
30 TABLET, FILM COATED ORAL NIGHTLY
Status: DISCONTINUED | OUTPATIENT
Start: 2022-07-14 | End: 2022-07-17 | Stop reason: HOSPADM

## 2022-07-14 RX ORDER — INSULIN LISPRO 100 [IU]/ML
0-6 INJECTION, SOLUTION INTRAVENOUS; SUBCUTANEOUS NIGHTLY
Status: DISCONTINUED | OUTPATIENT
Start: 2022-07-14 | End: 2022-07-17 | Stop reason: HOSPADM

## 2022-07-14 RX ORDER — ONDANSETRON 2 MG/ML
4 INJECTION INTRAMUSCULAR; INTRAVENOUS EVERY 6 HOURS PRN
Status: DISCONTINUED | OUTPATIENT
Start: 2022-07-14 | End: 2022-07-17 | Stop reason: HOSPADM

## 2022-07-14 RX ORDER — SERTRALINE HYDROCHLORIDE 100 MG/1
100 TABLET, FILM COATED ORAL DAILY
Status: DISCONTINUED | OUTPATIENT
Start: 2022-07-15 | End: 2022-07-17 | Stop reason: HOSPADM

## 2022-07-14 RX ORDER — INSULIN LISPRO 100 [IU]/ML
0-12 INJECTION, SOLUTION INTRAVENOUS; SUBCUTANEOUS
Status: DISCONTINUED | OUTPATIENT
Start: 2022-07-15 | End: 2022-07-17 | Stop reason: HOSPADM

## 2022-07-14 RX ORDER — POTASSIUM CHLORIDE 7.45 MG/ML
10 INJECTION INTRAVENOUS ONCE
Status: COMPLETED | OUTPATIENT
Start: 2022-07-14 | End: 2022-07-14

## 2022-07-14 RX ORDER — ONDANSETRON 4 MG/1
4 TABLET, ORALLY DISINTEGRATING ORAL EVERY 8 HOURS PRN
Status: DISCONTINUED | OUTPATIENT
Start: 2022-07-14 | End: 2022-07-17 | Stop reason: HOSPADM

## 2022-07-14 RX ORDER — IPRATROPIUM BROMIDE AND ALBUTEROL SULFATE 2.5; .5 MG/3ML; MG/3ML
1 SOLUTION RESPIRATORY (INHALATION) ONCE
Status: COMPLETED | OUTPATIENT
Start: 2022-07-14 | End: 2022-07-14

## 2022-07-14 RX ORDER — METHYLPREDNISOLONE SODIUM SUCCINATE 40 MG/ML
40 INJECTION, POWDER, LYOPHILIZED, FOR SOLUTION INTRAMUSCULAR; INTRAVENOUS EVERY 8 HOURS
Status: DISCONTINUED | OUTPATIENT
Start: 2022-07-14 | End: 2022-07-15

## 2022-07-14 RX ORDER — LEVOTHYROXINE SODIUM 0.12 MG/1
125 TABLET ORAL DAILY
Status: DISCONTINUED | OUTPATIENT
Start: 2022-07-15 | End: 2022-07-17 | Stop reason: HOSPADM

## 2022-07-14 RX ORDER — FERROUS SULFATE 325(65) MG
325 TABLET ORAL
Status: DISCONTINUED | OUTPATIENT
Start: 2022-07-15 | End: 2022-07-17 | Stop reason: HOSPADM

## 2022-07-14 RX ORDER — PIRFENIDONE 267 MG/1
3 TABLET, FILM COATED ORAL 3 TIMES DAILY
Status: DISCONTINUED | OUTPATIENT
Start: 2022-07-14 | End: 2022-07-17 | Stop reason: HOSPADM

## 2022-07-14 RX ORDER — MAGNESIUM SULFATE IN WATER 40 MG/ML
2000 INJECTION, SOLUTION INTRAVENOUS ONCE
Status: COMPLETED | OUTPATIENT
Start: 2022-07-14 | End: 2022-07-14

## 2022-07-14 RX ORDER — SODIUM CHLORIDE 0.9 % (FLUSH) 0.9 %
5-40 SYRINGE (ML) INJECTION PRN
Status: DISCONTINUED | OUTPATIENT
Start: 2022-07-14 | End: 2022-07-17 | Stop reason: HOSPADM

## 2022-07-14 RX ORDER — SODIUM CHLORIDE AND POTASSIUM CHLORIDE .9; .15 G/100ML; G/100ML
SOLUTION INTRAVENOUS CONTINUOUS
Status: DISCONTINUED | OUTPATIENT
Start: 2022-07-14 | End: 2022-07-15

## 2022-07-14 RX ORDER — ACETAMINOPHEN 325 MG/1
650 TABLET ORAL EVERY 6 HOURS PRN
Status: DISCONTINUED | OUTPATIENT
Start: 2022-07-14 | End: 2022-07-17 | Stop reason: HOSPADM

## 2022-07-14 RX ORDER — NADOLOL 40 MG/1
20 TABLET ORAL DAILY
Status: DISCONTINUED | OUTPATIENT
Start: 2022-07-15 | End: 2022-07-17 | Stop reason: HOSPADM

## 2022-07-14 RX ORDER — SODIUM CHLORIDE 0.9 % (FLUSH) 0.9 %
5-40 SYRINGE (ML) INJECTION EVERY 12 HOURS SCHEDULED
Status: DISCONTINUED | OUTPATIENT
Start: 2022-07-14 | End: 2022-07-17 | Stop reason: HOSPADM

## 2022-07-14 RX ORDER — LORAZEPAM 0.5 MG/1
0.5 TABLET ORAL EVERY 6 HOURS PRN
Status: DISCONTINUED | OUTPATIENT
Start: 2022-07-14 | End: 2022-07-17 | Stop reason: HOSPADM

## 2022-07-14 RX ADMIN — IPRATROPIUM BROMIDE AND ALBUTEROL SULFATE 1 AMPULE: 2.5; .5 SOLUTION RESPIRATORY (INHALATION) at 16:17

## 2022-07-14 RX ADMIN — POTASSIUM CHLORIDE AND SODIUM CHLORIDE: 900; 150 INJECTION, SOLUTION INTRAVENOUS at 21:53

## 2022-07-14 RX ADMIN — POTASSIUM CHLORIDE 10 MEQ: 7.46 INJECTION, SOLUTION INTRAVENOUS at 17:29

## 2022-07-14 RX ADMIN — POTASSIUM BICARBONATE 40 MEQ: 391 TABLET, EFFERVESCENT ORAL at 21:46

## 2022-07-14 RX ADMIN — Medication 10 ML: at 21:46

## 2022-07-14 RX ADMIN — POTASSIUM CHLORIDE 40 MEQ: 10 TABLET, EXTENDED RELEASE ORAL at 17:27

## 2022-07-14 RX ADMIN — MAGNESIUM SULFATE HEPTAHYDRATE 2000 MG: 40 INJECTION, SOLUTION INTRAVENOUS at 18:44

## 2022-07-14 RX ADMIN — MIRTAZAPINE 30 MG: 30 TABLET, FILM COATED ORAL at 21:46

## 2022-07-14 RX ADMIN — METHYLPREDNISOLONE SODIUM SUCCINATE 40 MG: 40 INJECTION, POWDER, FOR SOLUTION INTRAMUSCULAR; INTRAVENOUS at 21:46

## 2022-07-14 ASSESSMENT — LIFESTYLE VARIABLES: HOW OFTEN DO YOU HAVE A DRINK CONTAINING ALCOHOL: NEVER

## 2022-07-14 ASSESSMENT — PAIN - FUNCTIONAL ASSESSMENT: PAIN_FUNCTIONAL_ASSESSMENT: NONE - DENIES PAIN

## 2022-07-14 NOTE — ED NOTES
Report called to [facility], spoke to [facility]. RN denies further questions. [facility] transport team given report, pt remains alert and oriented, no apparent distress, vitals stable, transport team denies further questions. Pt care transferred at this time.      Collin Mena RN  07/14/22 [id number]

## 2022-07-14 NOTE — PLAN OF CARE
69yo woman pt well known to our service from prior admissions. Hx of cryptogenic organizing PNA and ILD pulm fibrosis, chronic on 3-5l/min O2 at baseline, presented to ED with SOB. CXR stable ILD. CBC stable. VBG reflects chronic compensated hypercapnia, also supported by HCO3 of 34 on renal panel.     K 2.7. Flu and COVID negative. I would avoid further diuresis today to avoid making metabolic alkalosis and hypokalemia worse. Replace 4G of Mg IV and start NS w K at 75/hr. Also give K PO supplement BID scheduled. Start solumedrol 40q8. Ask for pulm consultation. Admit to PCU with tele monitor.      Repeat blood work in am.     David Sutherland MD  7/14/2022  6:28 PM

## 2022-07-15 ENCOUNTER — APPOINTMENT (OUTPATIENT)
Dept: CT IMAGING | Age: 67
DRG: 196 | End: 2022-07-15
Payer: MEDICARE

## 2022-07-15 PROBLEM — F32.A DEPRESSION: Status: ACTIVE | Noted: 2022-07-15

## 2022-07-15 PROBLEM — I50.32 CHRONIC DIASTOLIC CONGESTIVE HEART FAILURE (HCC): Status: ACTIVE | Noted: 2022-07-15

## 2022-07-15 PROBLEM — E83.42 HYPOMAGNESEMIA: Status: ACTIVE | Noted: 2022-07-15

## 2022-07-15 LAB
ALBUMIN SERPL-MCNC: 2.7 G/DL (ref 3.4–5)
ANION GAP SERPL CALCULATED.3IONS-SCNC: 9 MMOL/L (ref 3–16)
BASOPHILS ABSOLUTE: 0 K/UL (ref 0–0.2)
BASOPHILS RELATIVE PERCENT: 0.5 %
BUN BLDV-MCNC: 7 MG/DL (ref 7–20)
C-REACTIVE PROTEIN: 24.1 MG/L (ref 0–5.1)
CALCIUM SERPL-MCNC: 8.5 MG/DL (ref 8.3–10.6)
CHLORIDE BLD-SCNC: 99 MMOL/L (ref 99–110)
CO2: 32 MMOL/L (ref 21–32)
CREAT SERPL-MCNC: <0.5 MG/DL (ref 0.6–1.2)
D DIMER: 0.72 UG/ML FEU (ref 0–0.6)
EOSINOPHILS ABSOLUTE: 0 K/UL (ref 0–0.6)
EOSINOPHILS RELATIVE PERCENT: 0.1 %
GFR AFRICAN AMERICAN: >60
GFR NON-AFRICAN AMERICAN: >60
GLUCOSE BLD-MCNC: 108 MG/DL (ref 70–99)
GLUCOSE BLD-MCNC: 112 MG/DL (ref 70–99)
GLUCOSE BLD-MCNC: 126 MG/DL (ref 70–99)
GLUCOSE BLD-MCNC: 166 MG/DL (ref 70–99)
GLUCOSE BLD-MCNC: 171 MG/DL (ref 70–99)
GLUCOSE BLD-MCNC: 178 MG/DL (ref 70–99)
HCT VFR BLD CALC: 33.5 % (ref 36–48)
HEMOGLOBIN: 10.8 G/DL (ref 12–16)
LYMPHOCYTES ABSOLUTE: 0.5 K/UL (ref 1–5.1)
LYMPHOCYTES RELATIVE PERCENT: 9.5 %
MAGNESIUM: 2.4 MG/DL (ref 1.8–2.4)
MCH RBC QN AUTO: 25.8 PG (ref 26–34)
MCHC RBC AUTO-ENTMCNC: 32.2 G/DL (ref 31–36)
MCV RBC AUTO: 80.2 FL (ref 80–100)
MONOCYTES ABSOLUTE: 0.1 K/UL (ref 0–1.3)
MONOCYTES RELATIVE PERCENT: 2.2 %
NEUTROPHILS ABSOLUTE: 4.6 K/UL (ref 1.7–7.7)
NEUTROPHILS RELATIVE PERCENT: 87.7 %
PDW BLD-RTO: 16.2 % (ref 12.4–15.4)
PERFORMED ON: ABNORMAL
PHOSPHORUS: 2.7 MG/DL (ref 2.5–4.9)
PLATELET # BLD: 182 K/UL (ref 135–450)
PMV BLD AUTO: 7.4 FL (ref 5–10.5)
POTASSIUM SERPL-SCNC: 3.9 MMOL/L (ref 3.5–5.1)
RBC # BLD: 4.18 M/UL (ref 4–5.2)
REASON FOR REJECTION: NORMAL
REJECTED TEST: NORMAL
SODIUM BLD-SCNC: 140 MMOL/L (ref 136–145)
WBC # BLD: 5.2 K/UL (ref 4–11)

## 2022-07-15 PROCEDURE — 80069 RENAL FUNCTION PANEL: CPT

## 2022-07-15 PROCEDURE — 2580000003 HC RX 258: Performed by: INTERNAL MEDICINE

## 2022-07-15 PROCEDURE — 94640 AIRWAY INHALATION TREATMENT: CPT

## 2022-07-15 PROCEDURE — 85379 FIBRIN DEGRADATION QUANT: CPT

## 2022-07-15 PROCEDURE — 71250 CT THORAX DX C-: CPT

## 2022-07-15 PROCEDURE — 83735 ASSAY OF MAGNESIUM: CPT

## 2022-07-15 PROCEDURE — 86140 C-REACTIVE PROTEIN: CPT

## 2022-07-15 PROCEDURE — 6370000000 HC RX 637 (ALT 250 FOR IP): Performed by: INTERNAL MEDICINE

## 2022-07-15 PROCEDURE — 36415 COLL VENOUS BLD VENIPUNCTURE: CPT

## 2022-07-15 PROCEDURE — 99223 1ST HOSP IP/OBS HIGH 75: CPT | Performed by: INTERNAL MEDICINE

## 2022-07-15 PROCEDURE — 94761 N-INVAS EAR/PLS OXIMETRY MLT: CPT

## 2022-07-15 PROCEDURE — 6360000002 HC RX W HCPCS: Performed by: INTERNAL MEDICINE

## 2022-07-15 PROCEDURE — 85025 COMPLETE CBC W/AUTO DIFF WBC: CPT

## 2022-07-15 PROCEDURE — 87449 NOS EACH ORGANISM AG IA: CPT

## 2022-07-15 PROCEDURE — 2700000000 HC OXYGEN THERAPY PER DAY

## 2022-07-15 PROCEDURE — 2060000000 HC ICU INTERMEDIATE R&B

## 2022-07-15 RX ORDER — ALBUTEROL SULFATE 2.5 MG/3ML
2.5 SOLUTION RESPIRATORY (INHALATION) EVERY 4 HOURS PRN
Status: DISCONTINUED | OUTPATIENT
Start: 2022-07-15 | End: 2022-07-17 | Stop reason: HOSPADM

## 2022-07-15 RX ORDER — IPRATROPIUM BROMIDE AND ALBUTEROL SULFATE 2.5; .5 MG/3ML; MG/3ML
1 SOLUTION RESPIRATORY (INHALATION)
Status: DISCONTINUED | OUTPATIENT
Start: 2022-07-16 | End: 2022-07-15

## 2022-07-15 RX ORDER — ALBUTEROL SULFATE 2.5 MG/3ML
2.5 SOLUTION RESPIRATORY (INHALATION) EVERY 6 HOURS PRN
Status: DISCONTINUED | OUTPATIENT
Start: 2022-07-15 | End: 2022-07-15

## 2022-07-15 RX ORDER — IPRATROPIUM BROMIDE AND ALBUTEROL SULFATE 2.5; .5 MG/3ML; MG/3ML
1 SOLUTION RESPIRATORY (INHALATION) 3 TIMES DAILY
Status: DISCONTINUED | OUTPATIENT
Start: 2022-07-16 | End: 2022-07-17 | Stop reason: HOSPADM

## 2022-07-15 RX ORDER — METHYLPREDNISOLONE SODIUM SUCCINATE 125 MG/2ML
60 INJECTION, POWDER, LYOPHILIZED, FOR SOLUTION INTRAMUSCULAR; INTRAVENOUS EVERY 8 HOURS
Status: DISCONTINUED | OUTPATIENT
Start: 2022-07-15 | End: 2022-07-17 | Stop reason: HOSPADM

## 2022-07-15 RX ADMIN — METHYLPREDNISOLONE SODIUM SUCCINATE 60 MG: 125 INJECTION, POWDER, FOR SOLUTION INTRAMUSCULAR; INTRAVENOUS at 20:12

## 2022-07-15 RX ADMIN — INSULIN LISPRO 1 UNITS: 100 INJECTION, SOLUTION INTRAVENOUS; SUBCUTANEOUS at 20:14

## 2022-07-15 RX ADMIN — ENOXAPARIN SODIUM 40 MG: 100 INJECTION SUBCUTANEOUS at 08:34

## 2022-07-15 RX ADMIN — LEVOTHYROXINE SODIUM 125 MCG: 125 TABLET ORAL at 05:02

## 2022-07-15 RX ADMIN — Medication 10 ML: at 20:12

## 2022-07-15 RX ADMIN — FERROUS SULFATE TAB 325 MG (65 MG ELEMENTAL FE) 325 MG: 325 (65 FE) TAB at 08:34

## 2022-07-15 RX ADMIN — NADOLOL 20 MG: 40 TABLET ORAL at 08:33

## 2022-07-15 RX ADMIN — METHYLPREDNISOLONE SODIUM SUCCINATE 60 MG: 125 INJECTION, POWDER, FOR SOLUTION INTRAMUSCULAR; INTRAVENOUS at 14:13

## 2022-07-15 RX ADMIN — POTASSIUM BICARBONATE 40 MEQ: 391 TABLET, EFFERVESCENT ORAL at 20:13

## 2022-07-15 RX ADMIN — SERTRALINE 100 MG: 100 TABLET, FILM COATED ORAL at 08:33

## 2022-07-15 RX ADMIN — MIRTAZAPINE 30 MG: 30 TABLET, FILM COATED ORAL at 20:13

## 2022-07-15 RX ADMIN — PANTOPRAZOLE SODIUM 40 MG: 40 TABLET, DELAYED RELEASE ORAL at 05:02

## 2022-07-15 RX ADMIN — POTASSIUM BICARBONATE 40 MEQ: 391 TABLET, EFFERVESCENT ORAL at 08:33

## 2022-07-15 RX ADMIN — INSULIN LISPRO 2 UNITS: 100 INJECTION, SOLUTION INTRAVENOUS; SUBCUTANEOUS at 12:23

## 2022-07-15 RX ADMIN — ATORVASTATIN CALCIUM 40 MG: 40 TABLET, FILM COATED ORAL at 08:34

## 2022-07-15 RX ADMIN — METHYLPREDNISOLONE SODIUM SUCCINATE 40 MG: 40 INJECTION, POWDER, FOR SOLUTION INTRAMUSCULAR; INTRAVENOUS at 05:02

## 2022-07-15 RX ADMIN — IPRATROPIUM BROMIDE AND ALBUTEROL SULFATE 1 AMPULE: 2.5; .5 SOLUTION RESPIRATORY (INHALATION) at 23:32

## 2022-07-15 RX ADMIN — MAGNESIUM SULFATE HEPTAHYDRATE 2000 MG: 40 INJECTION, SOLUTION INTRAVENOUS at 00:07

## 2022-07-15 NOTE — CONSULTS
[facility]                                 Pulmonary Consult /Progress Note :                                                                  CHIEF COMPLAINT: SOB  Reason ILD with acute exacerbation. Hypoxia acute on chronic. Consulting provider: Dr FRANKLIN    HPI:   Patient is 58-year-old female with extensive work-up for interstitial lung disease that include bronchoscopy with EBUS as well as cryobiopsy and reviewing her records showing most likely organizing pneumonia along with possibility of nonspecific interstitial pneumonia    She used to follow with Dr. Eliceo Leon and she was placed on pirfenidone    The patient denies any history of previously smoking    She uses 3 L of home oxygen and she noticed that her oxygen requirement has been increasing over the last few days along with shortness of breath and dyspnea on exertion    She was a placed on steroids in the past however she has not been taking any steroids for more than a year    Denies any sputum, most of her cough dry, denies any leg swelling, denies any history of travel or COVID exposure        It seems that secondary to financial issue hair pirfenidone was a stopped and that since then she noticed that she has been feeling weak and more short winded    Denies any fever chills chest pain no hemoptysis    Past Medical History:   Diagnosis Date    A-fib ([address]     Anxiety     Cryptogenic organizing pneumonia ([address]     Depression     GERD (gastroesophageal reflux disease)     Hyperlipidemia     On home oxygen therapy     uses O2 3L prn    Rash     Thyroid disease     hypothyroidism       Past Surgical History:        Procedure Laterality Date    ARM SURGERY Left 3/2/2020    LEFT ULNAR NERVE DECOMPRESSION AT THE ELBOW performed by Kacey Ng MD at [facility] N/A 6/27/2019    EBUS WF W/ANES.  performed by Raciel Garcia MD at [address] [place] [address]  6/27/2019 BRONCHOSCOPY/TRANSBRONCHIAL NEEDLE BIOPSY performed by Princess Nathalie MD at [facility]  6/27/2019    BRONCHOSCOPY/TRANSBRONCHIAL LUNG BIOPSY performed by Princess Nathalie MD at [facility]  6/27/2019    BRONCHOSCOPY ALVEOLAR LAVAGE performed by Princess Nathalie MD at [facility]  07/10/2019    BRONCHOSCOPY N/A 7/10/2019    BRONCHOSCOPY ALVEOLAR LAVAGE performed by Ez Buck MD at [facility] Bilateral 08/06/2019    BRONCHOSCOPY N/A 8/6/2019    BRONCHOSCOPY ALVEOLAR LAVAGE performed by Osmani Gaines MD at [facility] N/A 12/24/2019    BRONCHOSCOPY ALVEOLAR LAVAGE performed by Lauren Monreal MD at [facility], TOTAL ABDOMINAL (CERVIX REMOVED)      partial       Allergies:  is allergic to penicillins, cefuroxime, doxycycline, imitrex [sumatriptan], meperidine, other, sulfa antibiotics, keflex [cephalexin], and tape [adhesive tape]. Social History:    TOBACCO:   reports that she has never smoked. She has never used smokeless tobacco.  ETOH:   reports no history of alcohol use.       Family History:       Problem Relation Age of Onset    Diabetes Mother     High Blood Pressure Mother     Asthma Sister     Other Father        Current Medications:    Current Facility-Administered Medications:     0.9% NaCl with KCl 20 mEq infusion, , IntraVENous, Continuous, Augie Riley MD, Last Rate: 75 mL/hr at 07/14/22 2153, New Bag at 07/14/22 2153    potassium bicarb-citric acid (EFFER-K) effervescent tablet 40 mEq, 40 mEq, Oral, BID, Augie Riley MD, 40 mEq at 07/14/22 2146    atorvastatin (LIPITOR) tablet 40 mg, 40 mg, Oral, Daily, Augie Riley MD    ferrous sulfate (IRON 325) tablet 325 mg, 325 mg, Oral, Daily with breakfast, Augie Riley MD    hydrOXYzine HCl (ATARAX) tablet 10 mg, 10 mg, Oral, Q8H PRN, Augie Riley MD levothyroxine (SYNTHROID) tablet 125 mcg, 125 mcg, Oral, Daily, Volodymyr Ervin MD, 125 mcg at 07/15/22 0502    LORazepam (ATIVAN) tablet 0.5 mg, 0.5 mg, Oral, Q6H PRN, Volodymyr Ervin MD    mirtazapine (REMERON) tablet 30 mg, 30 mg, Oral, Nightly, Volodymyr Ervin MD, 30 mg at 07/14/22 2146    nadolol (CORGARD) tablet 20 mg, 20 mg, Oral, Daily, Volodymyr Ervin MD    pantoprazole (PROTONIX) tablet 40 mg, 40 mg, Oral, QAM AC, Volodymyr Ervin MD, 40 mg at 07/15/22 0502    Pirfenidone TABS 3 tablet, 3 tablet, Oral, TID, Volodymyr Ervin MD    sertraline (ZOLOFT) tablet 100 mg, 100 mg, Oral, Daily, Volodymyr Ervin MD    traMADol (ULTRAM) tablet 50 mg, 50 mg, Oral, Q6H PRN, Volodymyr Ervin MD    insulin lispro (HUMALOG) injection vial 0-12 Units, 0-12 Units, SubCUTAneous, TID WC, Volodymyr Ervin MD    insulin lispro (HUMALOG) injection vial 0-6 Units, 0-6 Units, SubCUTAneous, Nightly, Volodymyr Ervin MD    methylPREDNISolone sodium (SOLU-MEDROL) injection 40 mg, 40 mg, IntraVENous, Q8H, Volodymyr Ervin MD, 40 mg at 07/15/22 0502    sodium chloride flush 0.9 % injection 5-40 mL, 5-40 mL, IntraVENous, 2 times per day, Volodymyr Ervin MD, 10 mL at 07/14/22 2146    sodium chloride flush 0.9 % injection 5-40 mL, 5-40 mL, IntraVENous, PRN, Volodymyr Ervin MD    0.9 % sodium chloride infusion, , IntraVENous, PRN, Volodymyr Ervin MD    enoxaparin (LOVENOX) injection 40 mg, 40 mg, SubCUTAneous, Daily, Volodymyr Ervin MD    ondansetron (ZOFRAN-ODT) disintegrating tablet 4 mg, 4 mg, Oral, Q8H PRN **OR** ondansetron (ZOFRAN) injection 4 mg, 4 mg, IntraVENous, Q6H PRN, Volodymyr Ervin MD    polyethylene glycol (GLYCOLAX) packet 17 g, 17 g, Oral, Daily PRN, Volodymyr Ervin MD    acetaminophen (TYLENOL) tablet 650 mg, 650 mg, Oral, Q6H PRN **OR** acetaminophen (TYLENOL) suppository 650 mg, 650 mg, Rectal, Q6H PRN, Volodymyr Ervin, MD      REVIEW OF SYSTEMS:  Constitutional: Negative for fever  HENT: Negative for sore throat  Eyes: Negative for redness   Respiratory: + for dyspnea, cough  Cardiovascular: Negative for chest pain  Gastrointestinal: Negative for vomiting, diarrhea   Genitourinary: Negative for hematuria   Musculoskeletal: Negative for arthralgias   Skin: Negative for rash  Neurological: Negative for syncope  Hematological: Negative for adenopathy  Psychiatric/Behavorial: Negative for anxiety      Objective:   PHYSICAL EXAM:    Blood pressure 120/72, pulse 97, temperature 98.3 °F (36.8 °C), temperature source Oral, resp. rate 17, height 5' 3\" (1.6 m), weight 130 lb 3.2 oz (59.1 kg), SpO2 98 %, not currently breastfeeding.' on RA  Gen: No distress. Eyes: PERRL. No sclera icterus. No conjunctival injection. ENT: No discharge. Pharynx clear. Neck: Trachea midline. No obvious mass. Resp: Rhonchi bilaterally no clear Rales   CV: Regular rate. Regular rhythm. No murmur or rub. No edema. GI: Non-tender. Non-distended. No hernia. Skin: Warm and dry. No nodule on exposed extremities. Lymph: No cervical LAD. No supraclavicular LAD. M/S: No cyanosis. No joint deformity. No clubbing. Neuro: Awake. Alert. Moves all four extremities. Psych: Oriented x 3. No anxiety.            DATA reviewed by me:   CXR  CT       LABS/IMAGING:    CBC:  Lab Results   Component Value Date    WBC 5.2 07/15/2022    HGB 10.8 (L) 07/15/2022    HCT 33.5 (L) 07/15/2022    MCV 80.2 07/15/2022     07/15/2022    LYMPHOPCT 9.5 07/15/2022    RBC 4.18 07/15/2022    MCH 25.8 (L) 07/15/2022    MCHC 32.2 07/15/2022    RDW 16.2 (H) 07/15/2022    NEUTOPHILPCT 87.7 07/15/2022    MONOPCT 2.2 07/15/2022    BASOPCT 0.5 07/15/2022    NEUTROABS 4.6 07/15/2022    LYMPHSABS 0.5 (L) 07/15/2022    MONOSABS 0.1 07/15/2022    EOSABS 0.0 07/15/2022    BASOSABS 0.0 07/15/2022       Recent Labs     07/15/22  0732 07/14/22  1600 07/04/22  0220   WBC 5.2 10.9 9.6   HGB 10.8* 12.5 12.0   HCT 33.5* 38.8 37.5   MCV 80.2 80.9 81.2    251 260       BMP:   Recent Labs     07/14/22  1600 07/15/22  0732    140   K 2.7* 3.9   CL 92* 99   CO2 34* 32   PHOS  --  2.7   BUN 9 7   CREATININE 0.7 <0.5*       MG:   Lab Results   Component Value Date/Time    MG 1.50 07/14/2022 04:00 PM     Ca/Phos:   Lab Results   Component Value Date    CALCIUM 8.5 07/15/2022    PHOS 2.7 07/15/2022     LIVER PROFILE:   Recent Labs     07/14/22  1600   AST 26   ALT 11   BILITOT 0.9   ALKPHOS 138*       PT/INR: No results for input(s): PROTIME, INR in the last 72 hours. APTT: No results for input(s): APTT in the last 72 hours. Cardiac Enzymes:  Lab Results   Component Value Date    CKTOTAL 23 (L) 07/10/2019    TROPONINI <0.01 07/14/2022       Assessment:       -Based on the biopsies, seems h/o of cryptogenic organizing pneumonia and possibly coexisting NSIP or chronic HP.    -Acute exacerbation of ILD   -History of COPD  -Chronic respiratory failure-      Plan:      *-will proceed with the following work up   *-We will get CAT scan evaluation and will consider bronchoscopy if no improvement  *-Reviewing her record shows most likely cryptogenic organizing pneumonia with coexisting nonspecific interstitial pneumonia along with hypersensitivity pneumonitis  *-Increase the dose of steroids 60 every 8  *_Check LDH CRP strep pneumo Legionella sputum culture  *-Cover with antibiotic  *-Bronchodilator and ICS  *-Continue her pirfenidone and we will asked  to help make sure that she get her medication at home    Work-up for infectious process as a    Thank you very much for allowing me to participate in the care of this pleasant patient , should you have any questions ,please do not hesitate to contact me      Hermilo Davis MD,[facility]  Pulmonary&Critical Care Medicine    Shannon Tilley    NOTE: This report was transcribed using voice recognition software.  Every effort was made to ensure accuracy; however, inadvertent computerized transcription errors may be present.

## 2022-07-15 NOTE — PROGRESS NOTES
Patient admitted to room 301 from [facility] ed . Patient oriented to room, call light, bed rails, phone, lights and bathroom. Patient instructed about the schedule of the day including: vital sign frequency, lab draws, possible tests, frequency of MD and staff rounds, daily weights, I &O's and prescribed diet. Telemetry box in place, patient aware of placement and reason. Bed locked, in lowest position, side rails up 2/4, call light within reach. Recliner Assessment  Patient is able to demonstrate the ability to move from a reclining position to an upright position within the recliner. 4 Eyes Skin Assessment     The patient is being assess for   Admission    I agree that 2 RN's have performed a thorough Head to Toe Skin Assessment on the patient. ALL assessment sites listed below have been assessed. Areas assessed for pressure by both nurses:   [x]   Head, Face, and Ears   [x]   Shoulders, Back, and Chest, Abdomen  [x]   Arms, Elbows, and Hands   [x]   Coccyx, Sacrum, and Ischium  [x]   Legs, Feet, and Heels      Scattered scabbing throughout Skin Assessed Under all Medical Devices by both nurses:  O2 device tubing              All Mepilex Borders were peeled back and area peeked at by both nurses:  No: n/a  Please list where Mepilex Borders are located:               **SHARE this note so that the co-signing nurse is able to place an eSignature**    Co-signer eSignature: Electronically signed by Al Ferguson RN on 7/14/22 at 11:19 PM EDT    Does the Patient have Skin Breakdown related to pressure?   No                  Amos Prevention initiated:  Yes   Wound Care Orders initiated:  Yes      [address]  nurse consulted for Pressure Injury (Stage 3,4, Unstageable, DTI, NWPT, Complex wounds)and New or Established Ostomies:  Yes      Primary Nurse eSignature: Electronically signed by Ankit Way RN on 7/14/22 at 10:31 PM EDT

## 2022-07-15 NOTE — FLOWSHEET NOTE
07/15/22 0831   Vital Signs   Temp 97.9 °F (36.6 °C)   Temp Source Oral   Heart Rate 93   Heart Rate Source Monitor   Resp 16   BP (!) 144/81   MAP (Calculated) 102   Patient Position Sitting   Level of Consciousness Alert (0)   MEWS Score 1   Pain Assessment   Pain Assessment None - Denies Pain   Oxygen Therapy   SpO2 91 %   Pulse Oximetry Type Intermittent   Pulse Oximeter Device Mode Intermittent   Pulse Oximeter Device Location Finger   O2 Device None (Room air)   O2 Flow Rate (L/min) 6 L/min   Patient is resting showing no s/s of distress. Patient is alert and oriented. Meds were given, see MAR. Patient is denying any needs. Bed is in lowest position and call light is within reach. Will continue to monitor. Shift assessment complete, see flowsheets.

## 2022-07-15 NOTE — FLOWSHEET NOTE
07/14/22 2015   Vital Signs   Temp 97.6 °F (36.4 °C)   Temp Source Oral   Heart Rate 94   Heart Rate Source Monitor   Resp 17   /61   BP Location Right upper arm   MAP (Calculated) 78.67   Patient Position Lying left side   Level of Consciousness Alert (0)   MEWS Score 1   Pain Assessment   Pain Assessment None - Denies Pain   Oxygen Therapy   SpO2 92 %   Pulse Oximetry Type Continuous   O2 Device Nasal cannula   O2 Flow Rate (L/min) 6 L/min   pt resting in bed, meds given per MAR. Snack and drink provided, pt denying other needs at this time. Bed in lowest, call light within reach.  Maximo Kendrick RN

## 2022-07-15 NOTE — PLAN OF CARE
Problem: Discharge Planning  Goal: Discharge to home or other facility with appropriate resources  Outcome: Progressing  Flowsheets (Taken 7/14/2022 2145)  Discharge to home or other facility with appropriate resources:   Identify barriers to discharge with patient and caregiver   Identify discharge learning needs (meds, wound care, etc)   Refer to discharge planning if patient needs post-hospital services based on physician order or complex needs related to functional status, cognitive ability or social support system   Arrange for needed discharge resources and transportation as appropriate   Arrange for interpreters to assist at discharge as needed     Problem: Safety - Adult  Goal: Free from fall injury  Outcome: Progressing     Problem: ABCDS Injury Assessment  Goal: Absence of physical injury  Outcome: Progressing

## 2022-07-15 NOTE — H&P
Hospital Medicine History & Physical      PCP: Shena Rosas MD    Date of Admission: 7/14/2022    Date of Service: Pt seen/examined on 7/15/2022     Chief Complaint:    Chief Complaint   Patient presents with    Shortness of Breath     pt c/o increased SOB, gen weakness, bilat leg edema and nose bleeds for several days         History Of Present Illness: The patient is a 69 y.o. female with pmhx of cryptongenic organizing pneumonia, atrial fibrillation, CHF, pulmonary fibrosis, chronic hypoxic respiratory failure on 2 L O2, HLD, hypothyroidism, GERD, HLD, depression, anxiety, chronic back pain who presented to [facility] ED with complaint of worsening shortness of breath. Over the last few week her breathing has gotten progressively worse. She is unable to walk short distances to the bathroom without stopping multiple times 2/2 to symptoms. She reports that she was on a very expensive medication that was helping her breathing but she started getting very sick when she was taking it so it was discontinued, ever since she stopped taking it she has felt worse. She has felt increasingly fatigued and weak lately and has had nonproductive cough. She wears her oxygen all the time and does turn it up to 5 L some times with ambulation. Denies fever, chills, chest pain, nausea, vomiting, diarrhea, hematuria, dysuria, dizziness, syncope. No sick contacts  She has no tobacco abuse history. Past Medical History:        Diagnosis Date    A-fib ([facility]     Anxiety     Cryptogenic organizing pneumonia ([facility]     Depression     GERD (gastroesophageal reflux disease)     Hyperlipidemia     On home oxygen therapy     uses O2 3L prn    Rash     Thyroid disease     hypothyroidism       Past Surgical History:        Procedure Laterality Date    ARM SURGERY Left 3/2/2020    LEFT ULNAR NERVE DECOMPRESSION AT THE ELBOW performed by Justina Vu MD at [facility] N/A 6/27/2019    EBUS CALOS W/BECCA. performed by Ariana Castrejon MD at [address]  6/27/2019    BRONCHOSCOPY/TRANSBRONCHIAL NEEDLE BIOPSY performed by Ariana Castrejon MD at [address]  6/27/2019    BRONCHOSCOPY/TRANSBRONCHIAL LUNG BIOPSY performed by Ariana Castrejon MD at [address]  6/27/2019    BRONCHOSCOPY ALVEOLAR LAVAGE performed by Ariana Castrejon MD at [address]  07/10/2019    BRONCHOSCOPY N/A 7/10/2019    BRONCHOSCOPY ALVEOLAR LAVAGE performed by Peterson Dallas MD at [address]  Bilateral 08/06/2019    BRONCHOSCOPY N/A 8/6/2019    BRONCHOSCOPY ALVEOLAR LAVAGE performed by Randi Carlton MD at [address]  N/A 12/24/2019    BRONCHOSCOPY ALVEOLAR LAVAGE performed by Magda Landa MD at [place] [id number], TOTAL ABDOMINAL (CERVIX REMOVED)      partial       Medications Prior to Admission:    Prior to Admission medications    Medication Sig Start Date End Date Taking?  Authorizing Provider   albuterol sulfate HFA (PROVENTIL;VENTOLIN;PROAIR) 108 (90 Base) MCG/ACT inhaler INHALE 2 PUFFS INTO THE LUNGS EVERY 6 HOURS AS NEEDED FOR WHEEZING 7/13/22   Ani Bhat MD   Pirfenidone 267 MG TABS Take 3 tablets by mouth in the morning, at noon, and at bedtime    Historical Provider, MD   atorvastatin (LIPITOR) 40 MG tablet Take 1 tablet by mouth daily 5/27/22   Mathew Arellano MD   levothyroxine (SYNTHROID) 125 MCG tablet Take 1 tablet by mouth Daily TAKE 1 TABLET BY MOUTH EVERY DAY 5/27/22   Mathew Arellano MD   sertraline (ZOLOFT) 100 MG tablet Take 2 tablets by mouth daily 5/27/22   Mathew Arellano MD   mirtazapine (REMERON) 30 MG tablet Take 1 tablet by mouth nightly 5/27/22   Mathew Arellano MD   nadolol (CORGARD) 20 MG tablet Take 1 tablet by mouth daily 5/27/22   Mathew Arellano MD   pantoprazole (PROTONIX) 40 MG tablet Take 1 tablet by mouth every morning (before breakfast) 5/27/22   Gopal Quevedo MD   empagliflozin (JARDIANCE) 25 MG tablet Take 1 tablet by mouth daily 5/27/22   Gopal Quevedo MD   loratadine-pseudoephedrine ([employer] ALLERGY RELIEF-D12) 5-120 MG per extended release tablet TAKE 1 TABLET BY MOUTH TWICE A DAY 5/27/22   Gopal Quevedo MD   traMADol (ULTRAM) 50 MG tablet Take 1-2 tablets by mouth every 6 hours as needed for Pain for up to 180 days. Take lowest dose possible to manage pain 5/27/22 11/23/22  Gopal Quevedo MD   benzonatate (TESSALON) 200 MG capsule TAKE 1 CAPSULE BY MOUTH 3 TIMES A DAY AS NEEDED FOR COUGH 5/27/22   Gopal Quevedo MD   SITagliptin (JANUVIA) 100 MG tablet TAKE 1 TABLET BY MOUTH EVERY DAY 5/27/22   Gopal Quevedo MD   ferrous sulfate (IRON 325) 325 (65 Fe) MG tablet Take 1 tablet by mouth daily (with breakfast) 5/12/22   Juliana Diaz MD   hydrOXYzine (ATARAX) 10 MG tablet Take 1 tablet by mouth every 8 hours as needed for Itching TAKE 1 TO 3 TABLETS BY MOUTH 3 TIMES DAILY AS NEEDED FOR ITCHING 5/8/22   PANDA Cantu CNP   furosemide (LASIX) 40 MG tablet Take 1 tablet by mouth daily 5/9/22   PANDA Cantu CNP   acetaminophen (TYLENOL) 325 MG tablet TAKE 3 TABLETS (975 MG TOTAL) BY MOUTH 3 TIMES A DAY AS NEEDED FOR PAIN. 4/21/22   Historical Provider, MD   guaiFENesin (MUCINEX) 600 MG extended release tablet Take 1,200 mg by mouth every morning    Historical Provider, MD   naloxone 4 MG/0.1ML LIQD nasal spray SPRAY ONCE INTO ONE NOSTRIL IF NEEDED. CALL 911. REPEAT DOSE IN OTHER NOSTRIL IF NO RESPONSE IN 3MIN. 4/21/22   Historical Provider, MD   potassium chloride (KLOR-CON) 10 MEQ extended release tablet Take 20 mEq by mouth daily as needed    Historical Provider, MD   LORazepam (ATIVAN) 0.5 MG tablet Take 0.5 mg by mouth every 6 hours as needed for Anxiety.     Historical Provider, MD   albuterol (PROVENTIL) (2.5 MG/3ML) 0.083% nebulizer solution INHALE THE CONTENTS OF 1 VIAL VIA NEBULIZER EVERY 6 HOURS AS NEEDED FOR WHEEZING 2/7/22   Shanika Porter MD   Blood Glucose Monitoring Suppl (TRUE METRIX METER) PUJA Test daily and as needed 9/10/21   Shanika Porter MD   blood glucose test strips (ASCENSIA AUTODISC VI;ONE TOUCH ULTRA TEST VI) strip 1 each by In Vitro route daily As needed. 9/10/21   Shanika Porter MD   TRUEplus Lancets 33G MISC Use daily 9/10/21   Shanika Porter MD   Blood Glucose Calibration (TRUE METRIX LEVEL 1) Low SOLN use as needed 9/9/21   Shanika Porter MD   INSULIN SYRINGE 1CC/29G 29G X 1/2\" 1 ML MISC 1 each by Does not apply route 2 times daily 3/6/20   Shanika Porter MD   OXYGEN Inhale 3 L into the lungs 7/14/19   Historical Provider, MD       Allergies:  Penicillins, Cefuroxime, Doxycycline, Imitrex [sumatriptan], Meperidine, Other, Sulfa antibiotics, Keflex [cephalexin], and Tape [adhesive tape]    Social History:  The patient currently lives at home with her     TOBACCO:   reports that she has never smoked. She has never used smokeless tobacco.  ETOH:   reports no history of alcohol use. Family History:   Positive as follows:        Problem Relation Age of Onset    Diabetes Mother     High Blood Pressure Mother     Asthma Sister     Other Father        REVIEW OF SYSTEMS:       Constitutional: Negative for fever   HENT: Negative for sore throat   Eyes: Negative for redness   Respiratory: +  for dyspnea, cough   Cardiovascular: Negative for chest pain   Gastrointestinal: Negative for vomiting, diarrhea   Genitourinary: Negative for hematuria   Musculoskeletal: Negative for arthralgias   Skin: Negative for rash   Neurological: Negative for syncope   Hematological: Negative for adenopathy   Psychiatric/Behavorial: Negative for anxiety    PHYSICAL EXAM:    /72   Pulse 97   Temp 98.3 °F (36.8 °C) (Oral)   Resp 17   Ht 5' 3\" (1.6 m)   Wt 130 lb 3.2 oz (59.1 kg)   SpO2 98%   BMI 23.06 kg/m²     Gen: Mild distress. Alert. Pleasant elderly female + hunched over   Eyes: PERRL.  No sclera icterus. No conjunctival injection. ENT: No discharge. Pharynx clear. Neck: No JVD. No Carotid Bruit. Trachea midline. Resp: +Minimal accessory muscle use. No crackles. No wheezes. No rhonchi. CV: Regular rate. Regular rhythm. No murmur. No rub. No edema. Capillary Refill: Brisk,< 3 seconds   Peripheral Pulses: +2 palpable, equal bilaterally   GI: Non-tender. Non-distended. No masses. No organomegaly. Normal bowel sounds. No hernia. Skin: Warm and dry. No nodule on exposed extremities. No rash on exposed extremities. + multiple scattered lesions on the back, healing granulated tissue with surrounding erythema   M/S: No cyanosis. No joint deformity. No clubbing. Neuro: Awake. Grossly nonfocal    Psych: Oriented x 3. No anxiety or agitation.      CBC:   Recent Labs     07/14/22  1600   WBC 10.9   HGB 12.5   HCT 38.8   MCV 80.9        BMP:   Recent Labs     07/14/22  1600      K 2.7*   CL 92*   CO2 34*   BUN 9   CREATININE 0.7     LIVER PROFILE:   Recent Labs     07/14/22  1600   AST 26   ALT 11   BILITOT 0.9   ALKPHOS 138*         CARDIAC ENZYMES  Recent Labs     07/14/22  1600   TROPONINI <0.01       U/A:    Lab Results   Component Value Date/Time    COLORU Yellow 05/28/2022 06:39 AM    WBCUA 3-5 05/28/2022 06:39 AM    RBCUA 0-2 05/28/2022 06:39 AM    BACTERIA Rare 05/28/2022 06:39 AM    CLARITYU Clear 05/28/2022 06:39 AM    SPECGRAV >=1.030 05/28/2022 06:39 AM    LEUKOCYTESUR TRACE 05/28/2022 06:39 AM    BLOODU Negative 05/28/2022 06:39 AM    GLUCOSEU 100 05/28/2022 06:39 AM    AMORPHOUS Rare 05/28/2022 06:39 AM       ABG    Lab Results   Component Value Date/Time    URQ6ZND 20.4 06/26/2019 05:13 PM    BEART -2.9 06/26/2019 05:13 PM    X1PKHOUJ 90.6 06/26/2019 05:13 PM    PHART 7.440 06/26/2019 05:13 PM    CWL7DXG 30.8 06/26/2019 05:13 PM    PO2ART 55.9 06/26/2019 05:13 PM    ZHW4QLG 21.4 06/26/2019 05:13 PM       CULTURES  COVID and Flu not detected     EKG:  Normal sinus rhythmBaseline artifactLeft ventricular hypertrophyNonspecific ST & T wave changesAbnormal ECGWhen compared with ECG of 04-JUL-2022 02:39,No significant change was foundConfirmed by Roxanne Porter MD, FAHAD ([id number]) on 7/14/2022 6:11:00 PM    RADIOLOGY  XR CHEST PORTABLE   Final Result   No acute cardiopulmonary disease. Stable chronic pulmonary fibrosis. Pertinent previous results reviewed     Echocardiogram from 5/6/22     Summary   Technically difficult examination. Apical views are off axis secondary to pt   left ribs fracture. Left ventricular systolic function is normal with ejection fraction   estimated at 55%. No regional wall motion abnormalities. There is mild concentric left ventricular hypertrophy. Indeterminate left ventricular filling pressure. Systolic pulmonary artery pressure (SPAP) is normal estimated at 22 mmHg   (Right atrial pressure of 8 mmHg). Sharon Montana MD have reviewed the chart on [facility] and personally interviewed and examined patient, reviewed the data (labs and imaging) and after discussion with my PA formulated the plan. Agree with note with the following edits. HPI:     Patient is a 58-year-old white female with a history of cryptogenic organizing pneumonia, chronic respiratory failure 3 L oxygen, chronic diastolic heart failure, diabetes mellitus type 2, hyperlipidemia, anxiety, microcytic anemia, hypertension who came to emergency room with complaint of shortness of breath that started on Tuesday. She had a cough with some sputum green-colored. No hemoptysis. No fever or chills. She increased her oxygen level to 4 L at home. Presently she is on 6 L. In ER she was 79% on 4 L. I reviewed the patient's Past Medical History, Past Surgical History, Medications, and Allergies.      Physical exam:    /72   Pulse 97   Temp 98.3 °F (36.8 °C) (Oral)   Resp 17   Ht 5' 3\" (1.6 m)   Wt 130 lb 3.2 oz (59.1 kg)   SpO2 98%   BMI 23.06 kg/m² Gen: Mild distress. Alert. Eyes: PERRL. No sclera icterus. No conjunctival injection. ENT: No discharge. Pharynx clear. Neck: Trachea midline. Normal thyroid. Resp: Minimal accessory muscle use. + coarse crackles. No wheezes. No rhonchi. No dullness on percussion. CV: Regular rate. Regular rhythm. No murmur or rub. No edema. ASSESSMENT/PLAN:       Principal Problem:    ILD (interstitial lung disease) ([address]  Active Problems:    Interstitial lung disease (HCC)    Hypomagnesemia    Depression    HTN (hypertension)    Hyperlipidemia with target LDL less than 130    Hypokalemia    Hypothyroidism    Chronic respiratory failure with hypoxia (HCC)    Type 2 diabetes mellitus without complication (HCC)    Chronic congestive heart failure ([address]  Resolved Problems:    * No resolved hospital problems.  *      #Pulmonary fibrosis    #Hx of cryptogenic organizing pneumonia with hypersensitivity pneumonitis  #Acute on chronic hypoxic respiratory failure   -pt normally on 2-5 L O2 at baseline, requiring 6 here   -continue to wean as tolerated  -COVID and Flu negative   -CXR stable   -IV solumedrol increased  -sputum culture pending    -Pulmonology consulted      #Hypokalemia   #Hypomagnesemia   -K initially 2.7, Mg initially 1.5  -replaced  -WNLs     #Chronic diastolic HF   -last echo as above, from 5/6/22 EF 55%  -BNP mildly elevated   -diuresed in ED   -will hold off on further diuresis for now   -daily weights   -I and Os     #Elevated d-dimer   -0.72, mild elevation  -will let pulm decide on CTA, pt just had CT chest today     #DM type 2  -holding oral regimen   -SSI   -continue to monitor BG     #HTN  -BP stable   -on nadolol    #HLD   -continue statin     #Hypothyroidism   -continue synthroid     #Iron Deficiency Anemia  -continue PO iron   -Hgb stable     #Depression   #Anxiety   -continue zoloft   -prn ativan     #Chronic back pain, lumbar vertebra fracture   -on tramadol     #Wounds on back -continue neosporin     DVT Prophylaxis: lovenox   Diet: ADULT DIET;  Regular; 4 carb choices (60 gm/meal)  Code Status: Full Code      Marianela Heard MD 7/15/2022 11:33 AM

## 2022-07-15 NOTE — ACP (ADVANCE CARE PLANNING)
Advance Care Planning     General Advance Care Planning (ACP) Conversation    Date of Conversation: 7/14/2022  Conducted with: Patient with Decision Making Capacity    Healthcare Decision Maker:    Primary Decision Maker: Dede Herring - Spouse - [phone]    Secondary Decision Maker: Daya Hough - Child - [phone]  Click here to complete Healthcare Decision Makers including selection of the Healthcare Decision Maker Relationship (ie \"Primary\"). Today we documented Decision Maker(s) consistent with ACP documents on file. Content/Action Overview:   Has ACP document(s) on file - reflects the patient's care preferences  Reviewed DNR/DNI and patient elects Full Code (Attempt Resuscitation)      Length of Voluntary ACP Conversation in minutes:  <16 minutes (Non-Billable)    Sade Ho RN

## 2022-07-15 NOTE — FLOWSHEET NOTE
07/15/22 1407   Vital Signs   Temp 97.5 °F (36.4 °C)   Temp Source Oral   Heart Rate 78   Heart Rate Source Monitor   Resp 18   /80   BP Location Right upper arm   BP Method Automatic   MAP (Calculated) 97   Patient Position Sitting   Level of Consciousness Alert (0)   MEWS Score 1   Pain Assessment   Pain Assessment None - Denies Pain   Oxygen Therapy   SpO2 98 %   Pulse Oximetry Type Intermittent   Pulse Oximeter Device Mode Intermittent   Pulse Oximeter Device Location Finger   O2 Device Nasal cannula   O2 Flow Rate (L/min) 4 L/min   Patient resting. Denies any needs. RN will continue to monitor.

## 2022-07-15 NOTE — CARE COORDINATION
Case Management Assessment  Initial Evaluation      Patient Name: Verlin Curling  YOB: 1955  Diagnosis: ILD (interstitial lung disease) ([facility] [address] [J84.9]  Interstitial lung disease ([facility] [address] [J84.9]  Date / Time: 7/14/2022  3:32 PM    Admission status/Date: Inpatient 7/14/2022  Chart Reviewed: Yes      Patient Interviewed: Yes   Family Interviewed:  Yes     Hospitalization in the last 30 days:  No      Health Care Decision Maker :   Primary Decision Maker: Logan Seay - Spouse - [phone]    Secondary Decision Maker: Gena Johnston - [phone]      Who do you trust or have selected to make healthcare decisions for you      Met with: Patient at bedside. Current PCP: Angelo Liriano MD     Financial  [facility]  Precert required for SNF : Y       3 night stay required - N    ADLS  Support Systems/Care Needs: Spouse/Significant Other, Children  Transportation: family    Meal Preparation: self    Housing  Living Arrangements: Lives in ranch-style home with spouse  Steps: 2 [facility]  Intent for return to present living arrangements: Yes  Identified Issues:     [address] with [address] :  Yes  Agency: [employer]  SN/PT    Passport/Waiver : No  :                      Phone Number:    Passport/Waiver Services: n/a           Durable Medical Equiptment   DME Provider: [facility] for home o2  Equipment:   Walker___Cane___RTS___ BSC___Shower Chair_x__Hospital Bed___W/C____Other___Rollator_____  02 at __3__Liter(s)---wears(frequency)___cont____ HHN ___ CPAP___ BiPap___   N/A____      Home O2 Use :  Yes    If No for home O2---if presently on O2 during hospitalization:  Yes  if yes CM to follow for potential DC O2 need  Informed of need for care provider to bring portable home O2 tank on day of discharge for nursing to connect prior to leaving:   Yes  Verbalized agreement/Understanding:   Yes    Community Service Affiliation  Dialysis:  No Agency:  Location:  Dialysis Schedule:  Phone:   Fax: Other Community Services: n/a     DISCHARGE PLAN: Explained Case Management role/services. CM reviewed chart and met with patient at bedside to discuss discharge needs and plan. Patient lives with spouse. ITPA. Uses rollator with all ambulation. Family provides all transportation. Plans to return home at discharge with resumption of services through [address] Confirmed home care services with [facility]. Home o2 at 3L continuous via [facility]. Home o2 orders confirmed with [facility] at [facility]. Patient currently on 6L with spO2 91%. Will need new home o2 order if requirements continue to exceed home orders.  Sticky note left for treatment team for o2 walk test.     Yee Hills RN

## 2022-07-15 NOTE — PROGRESS NOTES
HR kept dropping into the 30s and got as low as 26. Patient was asymptomatic and MD was updated via [employer]. VSS.

## 2022-07-15 NOTE — ED PROVIDER NOTES
[facility] [place] [facility]U TELEMETRY      CHIEF COMPLAINT  Shortness of Breath (pt c/o increased SOB, gen weakness, bilat leg edema and nose bleeds for several days)       HISTORY OF PRESENT ILLNESS  Cristopher Lackey is a 69 y.o. female  who presents to the ED complaining of shortness of breath, generalized weakness as well as intermittent nosebleeds for the last several days. She was evaluated in this ED twice over the last couple of weeks, once for CHF exacerbation, another for fall. States that her symptoms mildly improved after her last ED visit, but that they are now worsening again. Does endorse cough that is minimally productive and is chronic for her. Endorses lower extremity swelling and worsening shortness of breath. They received breathing treatments by EMS in route which has improved her breathing somewhat. Denies any fevers, chills, nausea, vomiting, chest pain, abdominal pain. No other complaints, modifying factors or associated symptoms. I have reviewed the following from the nursing documentation. Past Medical History:   Diagnosis Date    [facility]-fib ([facility] [address] [facility]     Anxiety     Cryptogenic organizing pneumonia ([facility] [address] [facility]     Depression     GERD (gastroesophageal reflux disease)     Hyperlipidemia     On home oxygen therapy     uses O2 3L prn    Rash     Thyroid disease     hypothyroidism     Past Surgical History:   Procedure Laterality Date    ARM SURGERY Left 3/2/2020    LEFT ULNAR NERVE DECOMPRESSION AT THE ELBOW performed by Theresa Ureña MD at [facility] 6/27/2019    EBUS WF W/ANES.  performed by Dang He MD at [facility]  6/27/2019    BRONCHOSCOPY/TRANSBRONCHIAL NEEDLE BIOPSY performed by Dang He MD at [facility]  6/27/2019    BRONCHOSCOPY/TRANSBRONCHIAL LUNG BIOPSY performed by Dang He MD at [facility]  6/27/2019    BRONCHOSCOPY ALVEOLAR LAVAGE performed by Dang He MD at [facility] ENDOSCOPY    BRONCHOSCOPY  07/10/2019    BRONCHOSCOPY N/A 7/10/2019    BRONCHOSCOPY ALVEOLAR LAVAGE performed by Lilia Whiteside MD at [address] Bilateral 08/06/2019    BRONCHOSCOPY N/A 8/6/2019    BRONCHOSCOPY ALVEOLAR LAVAGE performed by Olvin Harper MD at [address] N/A 12/24/2019    BRONCHOSCOPY ALVEOLAR LAVAGE performed by Syed Preston MD at [facility], TOTAL ABDOMINAL (CERVIX REMOVED)      partial     Family History   Problem Relation Age of Onset    Diabetes Mother     High Blood Pressure Mother     Asthma Sister     Other Father      Social History     Socioeconomic History    Marital status:      Spouse name: Not on file    Number of children: 9    Years of education: Not on file    Highest education level: Not on file   Occupational History    Not on file   Tobacco Use    Smoking status: Never Smoker    Smokeless tobacco: Never Used   Vaping Use    Vaping Use: Never used   Substance and Sexual Activity    Alcohol use: No    Drug use: No    Sexual activity: Not Currently   Other Topics Concern    Not on file   Social History Narrative    Not on file     Social Determinants of Health     Financial Resource Strain:     Difficulty of Paying Living Expenses: Not on file   Food Insecurity:     Worried About Running Out of Food in the Last Year: Not on file    Miguel of Food in the Last Year: Not on file   Transportation Needs:     Lack of Transportation (Medical): Not on file    Lack of Transportation (Non-Medical):  Not on file   Physical Activity:     Days of Exercise per Week: Not on file    Minutes of Exercise per Session: Not on file   Stress:     Feeling of Stress : Not on file   Social Connections:     Frequency of Communication with Friends and Family: Not on file    Frequency of Social Gatherings with Friends and Family: Not on file    Attends Voodoo Services: Not on file    Active Member of Clubs or Organizations: Not on file    Attends Club or Organization Meetings: Not on file    Marital Status: Not on file   Intimate Partner Violence:     Fear of Current or Ex-Partner: Not on file    Emotionally Abused: Not on file    Physically Abused: Not on file    Sexually Abused: Not on file   Housing Stability:     Unable to Pay for Housing in the Last Year: Not on file    Number of Places Lived in the Last Year: Not on file    Unstable Housing in the Last Year: Not on file     Current Facility-Administered Medications   Medication Dose Route Frequency Provider Last Rate Last Admin    magnesium sulfate 2000 mg in 50 mL IVPB premix  2,000 mg IntraVENous Once Nabila Ambrocio MD        0.9% NaCl with KCl 20 mEq infusion   IntraVENous Continuous Nabila Ambrocio MD        potassium bicarb-citric acid (EFFER-K) effervescent tablet 40 mEq  40 mEq Oral BID Nabila Ambrocio MD       [address] [place] [address] [START ON 7/15/2022] atorvastatin (LIPITOR) tablet 40 mg  40 mg Oral Daily Nabila Ambrocio MD       [address] [place] [address] [START ON 7/15/2022] ferrous sulfate (IRON 325) tablet 325 mg  325 mg Oral Daily with breakfast Nabila Ambrocio MD        hydrOXYzine HCl (ATARAX) tablet 10 mg  10 mg Oral Q8H PRN Nabila Ambrocio MD        [START ON 7/15/2022] levothyroxine (SYNTHROID) tablet 125 mcg  125 mcg Oral Daily Nabila Ambrocio MD        LORazepam (ATIVAN) tablet 0.5 mg  0.5 mg Oral Q6H PRN Nabila Ambrocio MD        mirtazapine (REMERON) tablet 30 mg  30 mg Oral Nightly Nabila Ambrocio MD        [START ON 7/15/2022] nadolol (CORGARD) tablet 20 mg  20 mg Oral Daily Nabila Ambrocio MD        [START ON 7/15/2022] pantoprazole (PROTONIX) tablet 40 mg  40 mg Oral QAM AC Nabila Ambrocio MD        Pirfenidone TABS 3 tablet  3 tablet Oral TID Nabila Ambrocio MD        [START ON 7/15/2022] sertraline (ZOLOFT) tablet 100 mg  100 mg Oral Daily Yamil Louise MD        traMADol Chiivan Boone[place]urtain) tablet 50 mg  50 mg Oral Q6H PRN Yamil Louise MD        [START ON 7/15/2022] insulin lispro (HUMALOG) injection vial 0-12 Units  0-12 Units SubCUTAneous TID WC Yamil Louise MD        insulin lispro (HUMALOG) injection vial 0-6 Units  0-6 Units SubCUTAneous Nightly Yamil Louise MD        methylPREDNISolone sodium (SOLU-MEDROL) injection 40 mg  40 mg IntraVENous Q8H Yamil Louise MD        sodium chloride flush 0.9 % injection 5-40 mL  5-40 mL IntraVENous 2 times per day Yamil Louise MD        sodium chloride flush 0.9 % injection 5-40 mL  5-40 mL IntraVENous PRN Yamil Louise MD        0.9 % sodium chloride infusion   IntraVENous PRN MD Esperanza Austin [START ON 7/15/2022] enoxaparin (LOVENOX) injection 40 mg  40 mg SubCUTAneous Daily Yamil Louise MD        ondansetron (ZOFRAN-ODT) disintegrating tablet 4 mg  4 mg Oral Q8H PRN Yamil Louise MD        Or    ondansetron (ZOFRAN) injection 4 mg  4 mg IntraVENous Q6H PRN Yamil Louise MD        polyethylene glycol (GLYCOLAX) packet 17 g  17 g Oral Daily PRN Yamil Louise MD        acetaminophen (TYLENOL) tablet 650 mg  650 mg Oral Q6H PRN Yamil Louise MD        Or    acetaminophen (TYLENOL) suppository 650 mg  650 mg Rectal Q6H PRN Yamil Louise MD         Allergies   Allergen Reactions    Penicillins Anaphylaxis     Tolerates Meropenem.  Cefuroxime      Tolerates Meropenem.  Doxycycline Hives    Imitrex [Sumatriptan] Other (See Comments)     Feels like pins and needles    Meperidine     Other Other (See Comments)     Blood pressure drops, light headed    Sulfa Antibiotics Hives    Keflex [Cephalexin] Itching and Rash     Tolerates Meropenem.     Tape Chiquita Payer Tape] Rash       REVIEW OF SYSTEMS  10 systems reviewed, pertinent positives per HPI otherwise noted to be negative. PHYSICAL EXAM  /61   Pulse 94   Temp 97.6 °F (36.4 °C) (Oral)   Resp 17   Ht 5' 3\" (1.6 m)   Wt 132 lb 8 oz (60.1 kg)   SpO2 92%   BMI 23.47 kg/m²    General: Appears chronically ill. Alert  HEENT: Head atraumatic, Eyes normal inspection, PERRL. Normal ENT inspection, no active epistaxis, pharynx normal. No signs of dehydration  NECK: Normal inspection  RESPIRATORY: Fine crackles bilaterally. No chest wall tenderness. No respiratory distress on 6 L nasal cannula  CVS: Heart rate and rhythm regular. No Murmurs  ABDOMEN/GI: Soft, Non-tender, No distention  BACK: Normal inspection  EXTREMITIES: Non-Tender. Full ROM. Normal appearance. No Pedal edema  NEURO: Alert and oriented. Sensation normal. Motor normal  PSYCH: Mood normal. Affect normal.  SKIN: Color normal. No rash. Warm, Dry    LABS  I have reviewed all labs for this visit.    Results for orders placed or performed during the hospital encounter of 07/14/22   COVID-19, Rapid    Specimen: Nasopharyngeal Swab   Result Value Ref Range    SARS-CoV-2, NAAT Not Detected Not Detected   Rapid influenza A/B antigens    Specimen: Nasopharyngeal   Result Value Ref Range    Rapid Influenza A Ag Negative Negative    Rapid Influenza B Ag Negative Negative   CBC with Auto Differential   Result Value Ref Range    WBC 10.9 4.0 - 11.0 K/uL    RBC 4.80 4.00 - 5.20 M/uL    Hemoglobin 12.5 12.0 - 16.0 g/dL    Hematocrit 38.8 36.0 - 48.0 %    MCV 80.9 80.0 - 100.0 fL    MCH 26.0 26.0 - 34.0 pg    MCHC 32.2 31.0 - 36.0 g/dL    RDW 16.5 (H) 12.4 - 15.4 %    Platelets 477 566 - 420 K/uL    MPV 7.4 5.0 - 10.5 fL    Neutrophils % 71.2 %    Lymphocytes % 14.3 %    Monocytes % 6.7 %    Eosinophils % 6.9 %    Basophils % 0.9 %    Neutrophils Absolute 7.7 1.7 - 7.7 K/uL    Lymphocytes Absolute 1.6 1.0 - 5.1 K/uL    Monocytes Absolute 0.7 0.0 - 1.3 K/uL    Eosinophils Absolute 0.8 (H) 0.0 - 0.6 K/uL    Basophils Absolute 0.1 0.0 - 0.2 K/uL   Comprehensive Metabolic Panel w/ Reflex to MG   Result Value Ref Range    Sodium 138 136 - 145 mmol/L    Potassium reflex Magnesium 2.7 (LL) 3.5 - 5.1 mmol/L    Chloride 92 (L) 99 - 110 mmol/L    CO2 34 (H) 21 - 32 mmol/L    Anion Gap 12 3 - 16    Glucose 108 (H) 70 - 99 mg/dL    BUN 9 7 - 20 mg/dL    CREATININE 0.7 0.6 - 1.2 mg/dL    GFR Non-African American >60 >60    GFR African American >60 >60    Calcium 9.0 8.3 - 10.6 mg/dL    Total Protein 7.9 6.4 - 8.2 g/dL    Albumin 3.3 (L) 3.4 - 5.0 g/dL    Albumin/Globulin Ratio 0.7 (L) 1.1 - 2.2    Total Bilirubin 0.9 0.0 - 1.0 mg/dL    Alkaline Phosphatase 138 (H) 40 - 129 U/L    ALT 11 10 - 40 U/L    AST 26 15 - 37 U/L   Troponin   Result Value Ref Range    Troponin <0.01 <0.01 ng/mL   Brain Natriuretic Peptide   Result Value Ref Range    Pro- (H) 0 - 124 pg/mL   Blood Gas, Venous   Result Value Ref Range    pH, Jasper 7.388 7.350 - 7.450    pCO2, [place] 65.0 (H) 40.0 - 50.0 mmHg    pO2, [place] 31.6 25.0 - 40.0 mmHg    HCO3, Venous 38.3 (H) 23.0 - 29.0 mmol/L    Base Excess, [place]asper 10.5 (H) -3.0 - 3.0 mmol/L    O2 Sat, Jasper 58 Not Established %    Carboxyhemoglobin 2.5 (H) 0.0 - 1.5 %    MetHgb, [place]asper 0.3 <1.5 %    TC02 (Calc), [place]asper 40 Not Established mmol/L    O2 Therapy Unknown    Magnesium   Result Value Ref Range    Magnesium 1.50 (L) 1.80 - 2.40 mg/dL   POCT Glucose   Result Value Ref Range    POC Glucose 93 70 - 99 mg/dl    Performed on [employer]    EKG 12 Lead   Result Value Ref Range    Ventricular Rate 88 BPM    Atrial Rate 88 BPM    P-R Interval 160 ms    QRS Duration 84 ms    Q-T Interval 390 ms    QTc Calculation (Bazett) 471 ms    P Axis 42 degrees    R Axis -8 degrees    T Axis 105 degrees    Diagnosis       Normal sinus rhythmBaseline artifactLeft ventricular hypertrophyNonspecific ST & T wave changesAbnormal ECGWhen compared with ECG of 04-JUL-2022 02:39,No significant change was foundConfirmed by FAHAD Hutchison MD ([id number]) on 7/14/2022 6:11:00 PM       ECG  The Ekg interpreted by me shows  Sinus rhythm with a rate of 88 bpm.  Normal axis. No acute injury pattern. , QRS 84, QTc 471. RADIOLOGY  XR CHEST PORTABLE   Final Result   No acute cardiopulmonary disease. Stable chronic pulmonary fibrosis. ED COURSE/MDM  Patient seen and evaluated. Old records reviewed. Labs and imaging reviewed and results discussed with patient. Presented with acute on chronic shortness of breath. Lab work obtained. She does have a significantly elevated BNP which is higher than it was at her previous visit when diuresis was initiated. She is also noted to be severely hypokalemic and hypomagnesemic. She is stable on 6 L oxygen nasal cannula, which is increased from her usual 4 L. As she is currently stable, will replete her electrolytes before initiating diuresis. Patient discussed with hospitalist team who agreed to admit the patient to their service. Critical care    Critical care time 32 minutes exclusive from separate billable procedures that were performed. The following was considered in the determination of critical care but not limited to the level of medical decision making, intensive cardiac and/or respiratory monitoring, frequent vital sign monitoring, evaluation of laboratory studies, evaluation of radiographic studies, oxygen monitoring, and constant monitoring and speaking to family at bedside. Is this patient to be included in the SEP-1 Core Measure due to severe sepsis or septic shock? No   Exclusion criteria - the patient is NOT to be included for SEP-1 Core Measure due to:   Infection is not suspected    During the patient's ED course, the patient was given:  Medications   magnesium sulfate 2000 mg in 50 mL IVPB premix (has no administration in time range)   0.9% NaCl with KCl 20 mEq infusion (has no administration in time range)   potassium bicarb-citric acid (EFFER-K) effervescent tablet 40 mEq (has no administration in time range)   atorvastatin (LIPITOR) tablet 40 mg (has no administration in time range)   ferrous sulfate (IRON 325) tablet 325 mg (has no administration in time range)   hydrOXYzine HCl (ATARAX) tablet 10 mg (has no administration in time range)   levothyroxine (SYNTHROID) tablet 125 mcg (has no administration in time range)   LORazepam (ATIVAN) tablet 0.5 mg (has no administration in time range)   mirtazapine (REMERON) tablet 30 mg (has no administration in time range)   nadolol (CORGARD) tablet 20 mg (has no administration in time range)   pantoprazole (PROTONIX) tablet 40 mg (has no administration in time range)   Pirfenidone TABS 3 tablet (has no administration in time range)   sertraline (ZOLOFT) tablet 100 mg (has no administration in time range)   traMADol (ULTRAM) tablet 50 mg (has no administration in time range)   insulin lispro (HUMALOG) injection vial 0-12 Units (has no administration in time range)   insulin lispro (HUMALOG) injection vial 0-6 Units (has no administration in time range)   methylPREDNISolone sodium (SOLU-MEDROL) injection 40 mg (has no administration in time range)   sodium chloride flush 0.9 % injection 5-40 mL (has no administration in time range)   sodium chloride flush 0.9 % injection 5-40 mL (has no administration in time range)   0.9 % sodium chloride infusion (has no administration in time range)   enoxaparin (LOVENOX) injection 40 mg (has no administration in time range)   ondansetron (ZOFRAN-ODT) disintegrating tablet 4 mg (has no administration in time range)     Or   ondansetron (ZOFRAN) injection 4 mg (has no administration in time range)   polyethylene glycol (GLYCOLAX) packet 17 g (has no administration in time range)   acetaminophen (TYLENOL) tablet 650 mg (has no administration in time range)     Or   acetaminophen (TYLENOL) suppository 650 mg (has no administration in time range)   ipratropium-albuterol (DUONEB) nebulizer solution 1 ampule (1 ampule Inhalation Given 7/14/22 1617)   potassium chloride

## 2022-07-16 LAB
ALBUMIN SERPL-MCNC: 3.1 G/DL (ref 3.4–5)
ANION GAP SERPL CALCULATED.3IONS-SCNC: 9 MMOL/L (ref 3–16)
BASOPHILS ABSOLUTE: 0 K/UL (ref 0–0.2)
BASOPHILS RELATIVE PERCENT: 0.3 %
BUN BLDV-MCNC: 9 MG/DL (ref 7–20)
CALCIUM SERPL-MCNC: 8.8 MG/DL (ref 8.3–10.6)
CHLORIDE BLD-SCNC: 100 MMOL/L (ref 99–110)
CO2: 31 MMOL/L (ref 21–32)
CREAT SERPL-MCNC: <0.5 MG/DL (ref 0.6–1.2)
EOSINOPHILS ABSOLUTE: 0 K/UL (ref 0–0.6)
EOSINOPHILS RELATIVE PERCENT: 0.1 %
GFR AFRICAN AMERICAN: >60
GFR NON-AFRICAN AMERICAN: >60
GLUCOSE BLD-MCNC: 126 MG/DL (ref 70–99)
GLUCOSE BLD-MCNC: 139 MG/DL (ref 70–99)
GLUCOSE BLD-MCNC: 144 MG/DL (ref 70–99)
GLUCOSE BLD-MCNC: 150 MG/DL (ref 70–99)
GLUCOSE BLD-MCNC: 99 MG/DL (ref 70–99)
HCT VFR BLD CALC: 34.1 % (ref 36–48)
HEMOGLOBIN: 10.6 G/DL (ref 12–16)
L. PNEUMOPHILA SEROGP 1 UR AG: NORMAL
LYMPHOCYTES ABSOLUTE: 1.2 K/UL (ref 1–5.1)
LYMPHOCYTES RELATIVE PERCENT: 10.3 %
MCH RBC QN AUTO: 24.9 PG (ref 26–34)
MCHC RBC AUTO-ENTMCNC: 30.9 G/DL (ref 31–36)
MCV RBC AUTO: 80.5 FL (ref 80–100)
MONOCYTES ABSOLUTE: 0.8 K/UL (ref 0–1.3)
MONOCYTES RELATIVE PERCENT: 6.4 %
NEUTROPHILS ABSOLUTE: 9.9 K/UL (ref 1.7–7.7)
NEUTROPHILS RELATIVE PERCENT: 82.9 %
PDW BLD-RTO: 16.1 % (ref 12.4–15.4)
PERFORMED ON: ABNORMAL
PERFORMED ON: NORMAL
PHOSPHORUS: 2.6 MG/DL (ref 2.5–4.9)
PLATELET # BLD: 223 K/UL (ref 135–450)
PMV BLD AUTO: 8 FL (ref 5–10.5)
POTASSIUM SERPL-SCNC: 4.3 MMOL/L (ref 3.5–5.1)
RBC # BLD: 4.24 M/UL (ref 4–5.2)
SODIUM BLD-SCNC: 140 MMOL/L (ref 136–145)
STREP PNEUMONIAE ANTIGEN, URINE: NORMAL
WBC # BLD: 11.9 K/UL (ref 4–11)

## 2022-07-16 PROCEDURE — 99232 SBSQ HOSP IP/OBS MODERATE 35: CPT | Performed by: INTERNAL MEDICINE

## 2022-07-16 PROCEDURE — 6370000000 HC RX 637 (ALT 250 FOR IP): Performed by: INTERNAL MEDICINE

## 2022-07-16 PROCEDURE — 6360000002 HC RX W HCPCS: Performed by: INTERNAL MEDICINE

## 2022-07-16 PROCEDURE — 80069 RENAL FUNCTION PANEL: CPT

## 2022-07-16 PROCEDURE — 36415 COLL VENOUS BLD VENIPUNCTURE: CPT

## 2022-07-16 PROCEDURE — 94761 N-INVAS EAR/PLS OXIMETRY MLT: CPT

## 2022-07-16 PROCEDURE — 85025 COMPLETE CBC W/AUTO DIFF WBC: CPT

## 2022-07-16 PROCEDURE — 99233 SBSQ HOSP IP/OBS HIGH 50: CPT | Performed by: INTERNAL MEDICINE

## 2022-07-16 PROCEDURE — 2060000000 HC ICU INTERMEDIATE R&B

## 2022-07-16 PROCEDURE — 2700000000 HC OXYGEN THERAPY PER DAY

## 2022-07-16 PROCEDURE — 94640 AIRWAY INHALATION TREATMENT: CPT

## 2022-07-16 RX ORDER — POTASSIUM CHLORIDE 20 MEQ/1
40 TABLET, EXTENDED RELEASE ORAL 2 TIMES DAILY
Status: DISCONTINUED | OUTPATIENT
Start: 2022-07-16 | End: 2022-07-17

## 2022-07-16 RX ADMIN — MIRTAZAPINE 30 MG: 30 TABLET, FILM COATED ORAL at 21:26

## 2022-07-16 RX ADMIN — ENOXAPARIN SODIUM 40 MG: 100 INJECTION SUBCUTANEOUS at 08:30

## 2022-07-16 RX ADMIN — IPRATROPIUM BROMIDE AND ALBUTEROL SULFATE 1 AMPULE: 2.5; .5 SOLUTION RESPIRATORY (INHALATION) at 20:19

## 2022-07-16 RX ADMIN — PANTOPRAZOLE SODIUM 40 MG: 40 TABLET, DELAYED RELEASE ORAL at 05:18

## 2022-07-16 RX ADMIN — IPRATROPIUM BROMIDE AND ALBUTEROL SULFATE 1 AMPULE: 2.5; .5 SOLUTION RESPIRATORY (INHALATION) at 07:17

## 2022-07-16 RX ADMIN — METHYLPREDNISOLONE SODIUM SUCCINATE 60 MG: 125 INJECTION, POWDER, FOR SOLUTION INTRAMUSCULAR; INTRAVENOUS at 05:17

## 2022-07-16 RX ADMIN — IPRATROPIUM BROMIDE AND ALBUTEROL SULFATE 1 AMPULE: 2.5; .5 SOLUTION RESPIRATORY (INHALATION) at 12:31

## 2022-07-16 RX ADMIN — ATORVASTATIN CALCIUM 40 MG: 40 TABLET, FILM COATED ORAL at 08:30

## 2022-07-16 RX ADMIN — NADOLOL 20 MG: 40 TABLET ORAL at 08:30

## 2022-07-16 RX ADMIN — POTASSIUM CHLORIDE 40 MEQ: 1500 TABLET, EXTENDED RELEASE ORAL at 16:15

## 2022-07-16 RX ADMIN — SERTRALINE 100 MG: 100 TABLET, FILM COATED ORAL at 08:30

## 2022-07-16 RX ADMIN — FERROUS SULFATE TAB 325 MG (65 MG ELEMENTAL FE) 325 MG: 325 (65 FE) TAB at 08:30

## 2022-07-16 RX ADMIN — TRAMADOL HYDROCHLORIDE 50 MG: 50 TABLET ORAL at 16:15

## 2022-07-16 RX ADMIN — METHYLPREDNISOLONE SODIUM SUCCINATE 60 MG: 125 INJECTION, POWDER, FOR SOLUTION INTRAMUSCULAR; INTRAVENOUS at 16:15

## 2022-07-16 RX ADMIN — ACETAMINOPHEN 650 MG: 325 TABLET ORAL at 05:17

## 2022-07-16 RX ADMIN — POTASSIUM CHLORIDE 40 MEQ: 1500 TABLET, EXTENDED RELEASE ORAL at 21:25

## 2022-07-16 RX ADMIN — TRAMADOL HYDROCHLORIDE 50 MG: 50 TABLET ORAL at 01:40

## 2022-07-16 RX ADMIN — TRAMADOL HYDROCHLORIDE 50 MG: 50 TABLET ORAL at 08:36

## 2022-07-16 RX ADMIN — LEVOTHYROXINE SODIUM 125 MCG: 125 TABLET ORAL at 05:18

## 2022-07-16 RX ADMIN — INSULIN LISPRO 1 UNITS: 100 INJECTION, SOLUTION INTRAVENOUS; SUBCUTANEOUS at 21:26

## 2022-07-16 RX ADMIN — METHYLPREDNISOLONE SODIUM SUCCINATE 60 MG: 125 INJECTION, POWDER, FOR SOLUTION INTRAMUSCULAR; INTRAVENOUS at 21:25

## 2022-07-16 ASSESSMENT — PAIN SCALES - GENERAL
PAINLEVEL_OUTOF10: 6
PAINLEVEL_OUTOF10: 7
PAINLEVEL_OUTOF10: 6
PAINLEVEL_OUTOF10: 3

## 2022-07-16 ASSESSMENT — PAIN DESCRIPTION - LOCATION
LOCATION: BACK
LOCATION: BACK

## 2022-07-16 ASSESSMENT — PAIN DESCRIPTION - ORIENTATION
ORIENTATION: MID
ORIENTATION: MID

## 2022-07-16 ASSESSMENT — PAIN DESCRIPTION - DESCRIPTORS
DESCRIPTORS: ACHING
DESCRIPTORS: ACHING

## 2022-07-16 NOTE — PROGRESS NOTES
RT Inhaler-Nebulizer Bronchodilator Protocol Note    There is a bronchodilator order in the chart from a provider indicating to follow the RT Bronchodilator Protocol and there is an Initiate RT Inhaler-Nebulizer Bronchodilator Protocol order as well (see protocol at bottom of note). CXR Findings:  XR CHEST PORTABLE    Result Date: 7/14/2022  No acute cardiopulmonary disease. Stable chronic pulmonary fibrosis. The findings from the last RT Protocol Assessment were as follows:   History Pulmonary Disease: Chronic pulmonary disease  Respiratory Pattern: Regular pattern and RR 12-20 bpm  Breath Sounds: Slightly diminished and/or crackles  Cough: Strong, productive  Indication for Bronchodilator Therapy: Decreased or absent breath sounds  Bronchodilator Assessment Score: 5    Aerosolized bronchodilator medication orders have been revised according to the RT Inhaler-Nebulizer Bronchodilator Protocol below. Respiratory Therapist to perform RT Therapy Protocol Assessment initially then follow the protocol. Repeat RT Therapy Protocol Assessment PRN for score 0-3 or on second treatment, BID, and PRN for scores above 3. No Indications - adjust the frequency to every 6 hours PRN wheezing or bronchospasm, if no treatments needed after 48 hours then discontinue using Per Protocol order mode. If indication present, adjust the RT bronchodilator orders based on the Bronchodilator Assessment Score as indicated below. Use Inhaler orders unless patient has one or more of the following: on home nebulizer, not able to hold breath for 10 seconds, is not alert and oriented, cannot activate and use MDI correctly, or respiratory rate 25 breaths per minute or more, then use the equivalent nebulizer order(s) with same Frequency and PRN reasons based on the score. If a patient is on this medication at home then do not decrease Frequency below that used at home.     0-3 - enter or revise RT bronchodilator order(s) to equivalent RT Bronchodilator order with Frequency of every 4 hours PRN for wheezing or increased work of breathing using Per Protocol order mode. 4-6 - enter or revise RT Bronchodilator order(s) to two equivalent RT bronchodilator orders with one order with BID Frequency and one order with Frequency of every 4 hours PRN wheezing or increased work of breathing using Per Protocol order mode. 7-10 - enter or revise RT Bronchodilator order(s) to two equivalent RT bronchodilator orders with one order with TID Frequency and one order with Frequency of every 4 hours PRN wheezing or increased work of breathing using Per Protocol order mode. 11-13 - enter or revise RT Bronchodilator order(s) to one equivalent RT bronchodilator order with QID Frequency and an Albuterol order with Frequency of every 4 hours PRN wheezing or increased work of breathing using Per Protocol order mode. Greater than 13 - enter or revise RT Bronchodilator order(s) to one equivalent RT bronchodilator order with every 4 hours Frequency and an Albuterol order with Frequency of every 2 hours PRN wheezing or increased work of breathing using Per Protocol order mode.          Electronically signed by Destinee Salazar RCP on 7/15/2022 at 11:43 PM

## 2022-07-16 NOTE — PROGRESS NOTES
RT Inhaler-Nebulizer Bronchodilator Protocol Note    There is a bronchodilator order in the chart from a provider indicating to follow the RT Bronchodilator Protocol and there is an Initiate RT Inhaler-Nebulizer Bronchodilator Protocol order as well (see protocol at bottom of note). CXR Findings:  XR CHEST PORTABLE    Result Date: 7/14/2022  No acute cardiopulmonary disease. Stable chronic pulmonary fibrosis. The findings from the last RT Protocol Assessment were as follows:   History Pulmonary Disease: (P) Chronic pulmonary disease  Respiratory Pattern: (P) Regular pattern and RR 12-20 bpm  Breath Sounds: (P) Slightly diminished and/or crackles  Cough: (P) Strong, productive  Indication for Bronchodilator Therapy: (P) On home bronchodilators  Bronchodilator Assessment Score: (P) 5    Aerosolized bronchodilator medication orders have been revised according to the RT Inhaler-Nebulizer Bronchodilator Protocol below. Respiratory Therapist to perform RT Therapy Protocol Assessment initially then follow the protocol. Repeat RT Therapy Protocol Assessment PRN for score 0-3 or on second treatment, BID, and PRN for scores above 3. No Indications - adjust the frequency to every 6 hours PRN wheezing or bronchospasm, if no treatments needed after 48 hours then discontinue using Per Protocol order mode. If indication present, adjust the RT bronchodilator orders based on the Bronchodilator Assessment Score as indicated below. Use Inhaler orders unless patient has one or more of the following: on home nebulizer, not able to hold breath for 10 seconds, is not alert and oriented, cannot activate and use MDI correctly, or respiratory rate 25 breaths per minute or more, then use the equivalent nebulizer order(s) with same Frequency and PRN reasons based on the score. If a patient is on this medication at home then do not decrease Frequency below that used at home.     0-3 - enter or revise RT bronchodilator order(s) to equivalent RT Bronchodilator order with Frequency of every 4 hours PRN for wheezing or increased work of breathing using Per Protocol order mode. 4-6 - enter or revise RT Bronchodilator order(s) to two equivalent RT bronchodilator orders with one order with BID Frequency and one order with Frequency of every 4 hours PRN wheezing or increased work of breathing using Per Protocol order mode. 7-10 - enter or revise RT Bronchodilator order(s) to two equivalent RT bronchodilator orders with one order with TID Frequency and one order with Frequency of every 4 hours PRN wheezing or increased work of breathing using Per Protocol order mode. 11-13 - enter or revise RT Bronchodilator order(s) to one equivalent RT bronchodilator order with QID Frequency and an Albuterol order with Frequency of every 4 hours PRN wheezing or increased work of breathing using Per Protocol order mode. Greater than 13 - enter or revise RT Bronchodilator order(s) to one equivalent RT bronchodilator order with every 4 hours Frequency and an Albuterol order with Frequency of every 2 hours PRN wheezing or increased work of breathing using Per Protocol order mode.          Electronically signed by Andrey Mccollum RCP on 7/16/2022 at 7:21 AM

## 2022-07-16 NOTE — DISCHARGE INSTR - DIET

## 2022-07-16 NOTE — PROGRESS NOTES
Pt  requesting an albuterol treatment, was on home med rec, but not ordered here. [facility] sent at this time for PRNs.  Manuel Greer RN

## 2022-07-16 NOTE — PROGRESS NOTES
Patient resting in bed, bedside report given to [facility]. Care of patient transferred at this time.

## 2022-07-16 NOTE — PROGRESS NOTES
[facility] Pulmonary, Critical Care and Sleep Specialists                                 Pulmonary Consult /Progress Note :                                                                  CHIEF COMPLAINT: SOB  Reason ILD with acute exacerbation. Hypoxia acute on chronic. HPI:       Patient has been doing better today, her shortness of breath has improved, she denies any fever or chills she denies any hemoptysis,    She is on 3.5 L which is close to her baseline    On further investigation she states she stopped her pirfenidone as it caused to hear nausea and vomiting and not for financial issue      Objective:   PHYSICAL EXAM:    Blood pressure 138/75, pulse 100, temperature 98.2 °F (36.8 °C), temperature source Oral, resp. rate 18, height 5' 3\" (1.6 m), weight 133 lb 11.2 oz (60.6 kg), SpO2 97 %, not currently breastfeeding.' on RA  Gen: No distress. Eyes: PERRL. No sclera icterus. No conjunctival injection. ENT: No discharge. Pharynx clear. Neck: Trachea midline. No obvious mass. Resp: Rhonchi bilaterally no clear Rales   CV: Regular rate. Regular rhythm. No murmur or rub. No edema. GI: Non-tender. Non-distended. No hernia. Skin: Warm and dry. No nodule on exposed extremities. Lymph: No cervical LAD. No supraclavicular LAD. M/S: No cyanosis. No joint deformity. No clubbing. Neuro: Awake. Alert. Moves all four extremities. Psych: Oriented x 3. No anxiety.            DATA reviewed by me:   CXR  CT       LABS/IMAGING:    CBC:  Lab Results   Component Value Date    WBC 11.9 (H) 07/16/2022    HGB 10.6 (L) 07/16/2022    HCT 34.1 (L) 07/16/2022    MCV 80.5 07/16/2022     07/16/2022    LYMPHOPCT 10.3 07/16/2022    RBC 4.24 07/16/2022    MCH 24.9 (L) 07/16/2022    MCHC 30.9 (L) 07/16/2022    RDW 16.1 (H) 07/16/2022    NEUTOPHILPCT 82.9 07/16/2022    MONOPCT 6.4 07/16/2022    BASOPCT 0.3 07/16/2022    NEUTROABS 9.9 (H) 07/16/2022    LYMPHSABS 1.2 07/16/2022    MONOSABS 0.8 07/16/2022    EOSABS 0.0 07/16/2022    BASOSABS 0.0 07/16/2022       Recent Labs     07/16/22  0458 07/15/22  0732 07/14/22  1600   WBC 11.9* 5.2 10.9   HGB 10.6* 10.8* 12.5   HCT 34.1* 33.5* 38.8   MCV 80.5 80.2 80.9    182 251         BMP:   Recent Labs     07/14/22  1600 07/15/22  0732 07/16/22  0458    140 140   K 2.7* 3.9 4.3   CL 92* 99 100   CO2 34* 32 31   PHOS  --  2.7 2.6   BUN 9 7 9   CREATININE 0.7 <0.5* <0.5*         MG:   Lab Results   Component Value Date/Time    MG 2.40 07/15/2022 09:47 AM     Ca/Phos:   Lab Results   Component Value Date    CALCIUM 8.8 07/16/2022    PHOS 2.6 07/16/2022     LIVER PROFILE:   Recent Labs     07/14/22  1600   AST 26   ALT 11   BILITOT 0.9   ALKPHOS 138*         PT/INR: No results for input(s): PROTIME, INR in the last 72 hours. APTT: No results for input(s): APTT in the last 72 hours.     Cardiac Enzymes:  Lab Results   Component Value Date    CKTOTAL 23 (L) 07/10/2019    TROPONINI <0.01 07/14/2022       Assessment:       -Based on the biopsies, seems h/o of cryptogenic organizing pneumonia and possibly coexisting NSIP or chronic HP.    -Acute exacerbation of ILD   -History of COPD  -Chronic respiratory failure-      Plan:      *-w start weaning steroids  *-CAT scan reviewed yesterday actually showing some improvement in the groundglass and the pattern she has which is more suggestive of NSIP versus UIP however diagnosis with biopsy was organizing pneumonia  *-I change steroids to 40 mg every 8  *_Check LDH CRP strep pneumo Legionella sputum culture still pending  *-Cover with antibiotic  *-Bronchodilator and ICS  *-Continue her pirfenidone     To follow as outpatient  Work-up for infectious process as   She is to go for vertebrae fracture surgery, she understands she is very high risk for the procedure it was planned to be done at [facility]    Thank you very much for allowing me to participate in the care of this pleasant patient , should you have any questions ,please do not hesitate to contact me      Elisabeth Davis MD,[facility]  Pulmonary&Critical Care Medicine    Zach Lobo    NOTE: This report was transcribed using voice recognition software. Every effort was made to ensure accuracy; however, inadvertent computerized transcription errors may be present.

## 2022-07-16 NOTE — PROGRESS NOTES
IM Progress Note    Admit Date:  7/14/2022  2    Interval history:  ILD     Subjective:  Ms. Moris Fiore seen up in bed, remains on 4.5 L o2 , feels much improved today     Objective:   /75   Pulse 100   Temp 98.2 °F (36.8 °C) (Oral)   Resp 18   Ht 5' 3\" (1.6 m)   Wt 133 lb 11.2 oz (60.6 kg)   SpO2 97%   BMI 23.68 kg/m²     Intake/Output Summary (Last 24 hours) at 7/16/2022 0737  Last data filed at 7/16/2022 0521  Gross per 24 hour   Intake 300 ml   Output 1100 ml   Net -800 ml       Physical Exam:      General:  elderly female up in bed,  Awake, alert and oriented. Appears to be not in any distress  Mucous Membranes:  Pink , anicteric  Neck: No JVD, no carotid bruit, no thyromegaly  Chest:  Clear to auscultation bilaterally, with resolving bibasilar crackles  Cardiovascular:  RRR S1S2 heard, no murmurs or gallops  Abdomen:  Soft, undistended, non tender, no organomegaly, BS present  Extremities: No edema or cyanosis.  Distal pulses well felt  Kyphosis   Neurological : grossly normal      Medications:   Scheduled Medications:    methylPREDNISolone  60 mg IntraVENous Q8H    ipratropium-albuterol  1 ampule Inhalation TID    potassium bicarb-citric acid  40 mEq Oral BID    atorvastatin  40 mg Oral Daily    ferrous sulfate  325 mg Oral Daily with breakfast    levothyroxine  125 mcg Oral Daily    mirtazapine  30 mg Oral Nightly    nadolol  20 mg Oral Daily    pantoprazole  40 mg Oral QAM AC    Pirfenidone  3 tablet Oral TID    sertraline  100 mg Oral Daily    insulin lispro  0-12 Units SubCUTAneous TID WC    insulin lispro  0-6 Units SubCUTAneous Nightly    sodium chloride flush  5-40 mL IntraVENous 2 times per day    enoxaparin  40 mg SubCUTAneous Daily     I   sodium chloride       albuterol, hydrOXYzine HCl, LORazepam, traMADol, sodium chloride flush, sodium chloride, ondansetron **OR** ondansetron, polyethylene glycol, acetaminophen **OR** acetaminophen    Lab Data:  Recent Labs     07/14/22  1600 changesAbnormal ECGWhen compared with ECG of 04-JUL-2022 02:39,No significant change was foundConfirmed by Selma Holstein MD, STEPHEN ([id number]) on 7/14/2022 6:11:00 PM     RADIOLOGY  XR CHEST PORTABLE   Final Result   No acute cardiopulmonary disease. Stable chronic pulmonary fibrosis. Pertinent previous results reviewed      Echocardiogram from 5/6/22      Summary   Technically difficult examination. Apical views are off axis secondary to pt   left ribs fracture. Left ventricular systolic function is normal with ejection fraction   estimated at 55%. No regional wall motion abnormalities. There is mild concentric left ventricular hypertrophy. Indeterminate left ventricular filling pressure. Systolic pulmonary artery pressure (SPAP) is normal estimated at 22 mmHg   (Right atrial pressure of 8 mmHg).              ASSESSMENT/PLAN:         #Acute on chronic hypoxic respiratory failure  #Pulmonary fibrosis    #Hx of cryptogenic organizing pneumonia with hypersensitivity pneumonitis    -pt normally on 2-4 L O2 at baseline, requiring 6L here with dyspnea   -continue to wean as tolerated- now on 4.5  -COVID and Flu negative  -CXR stable findings   -  -Pulmonology consulted  -IV solumedrol increased to 60 mg q8h by pulm  -sputum culture pending          #Hypokalemia  #Hypomagnesemia  -K initially 2.7, Mg initially 1.5  -replaced       #Chronic diastolic HF  -last echo as above, from 5/6/22 EF 55%  -BNP mildly elevated  -given lasix in ER   -will hold off on further diuresis for now   -daily weights     #Elevated d-dimer  -0.72, mild elevation     #DM type 2  -holding oral regimen  - hyperglycemia with steroids  -SSI  -continue to monitor BG     #HTN  -BP stable  -on nadolol     #HLD  -continue statin     #Hypothyroidism  -continue synthroid     #Iron Deficiency Anemia  -continue PO iron  -Hgb stable     #Depression  #Anxiety  -continue zoloft  -prn ativan     #Chronic back pain, lumbar vertebra fracture   -on

## 2022-07-16 NOTE — FLOWSHEET NOTE
07/15/22 1934   Vital Signs   Temp 97.7 °F (36.5 °C)   Temp Source Oral   Heart Rate 91   Heart Rate Source Monitor   Resp 18   /73   BP Method Automatic   MAP (Calculated) 88.67   Patient Position Sitting   Level of Consciousness Alert (0)   MEWS Score 1   Pain Assessment   Pain Assessment None - Denies Pain   Oxygen Therapy   SpO2 96 %   O2 Device Nasal cannula   O2 Flow Rate (L/min) 5 L/min   Pt resting in bed, meds given per MAR. Pt denying needs at this time.  Call light within reach, bed in lowest. Raliegh Gosselin, RN

## 2022-07-16 NOTE — PLAN OF CARE
Problem: Discharge Planning  Goal: Discharge to home or other facility with appropriate resources  Outcome: Adequate for Discharge     Problem: Safety - Adult  Goal: Free from fall injury  Outcome: Adequate for Discharge     Problem: ABCDS Injury Assessment  Goal: Absence of physical injury  Outcome: Adequate for Discharge

## 2022-07-17 VITALS
HEART RATE: 101 BPM | DIASTOLIC BLOOD PRESSURE: 86 MMHG | HEIGHT: 63 IN | SYSTOLIC BLOOD PRESSURE: 143 MMHG | RESPIRATION RATE: 18 BRPM | WEIGHT: 134.9 LBS | TEMPERATURE: 97.9 F | OXYGEN SATURATION: 99 % | BODY MASS INDEX: 23.9 KG/M2

## 2022-07-17 LAB
ALBUMIN SERPL-MCNC: 3.5 G/DL (ref 3.4–5)
ANION GAP SERPL CALCULATED.3IONS-SCNC: 8 MMOL/L (ref 3–16)
BASOPHILS ABSOLUTE: 0 K/UL (ref 0–0.2)
BASOPHILS RELATIVE PERCENT: 0.1 %
BUN BLDV-MCNC: 12 MG/DL (ref 7–20)
CALCIUM SERPL-MCNC: 9.1 MG/DL (ref 8.3–10.6)
CHLORIDE BLD-SCNC: 100 MMOL/L (ref 99–110)
CO2: 31 MMOL/L (ref 21–32)
CREAT SERPL-MCNC: <0.5 MG/DL (ref 0.6–1.2)
EOSINOPHILS ABSOLUTE: 0 K/UL (ref 0–0.6)
EOSINOPHILS RELATIVE PERCENT: 0 %
GFR AFRICAN AMERICAN: >60
GFR NON-AFRICAN AMERICAN: >60
GLUCOSE BLD-MCNC: 121 MG/DL (ref 70–99)
GLUCOSE BLD-MCNC: 146 MG/DL (ref 70–99)
GLUCOSE BLD-MCNC: 148 MG/DL (ref 70–99)
HCT VFR BLD CALC: 36.2 % (ref 36–48)
HEMOGLOBIN: 11.3 G/DL (ref 12–16)
LYMPHOCYTES ABSOLUTE: 0.8 K/UL (ref 1–5.1)
LYMPHOCYTES RELATIVE PERCENT: 5.9 %
MCH RBC QN AUTO: 25.3 PG (ref 26–34)
MCHC RBC AUTO-ENTMCNC: 31.3 G/DL (ref 31–36)
MCV RBC AUTO: 80.7 FL (ref 80–100)
MONOCYTES ABSOLUTE: 0.4 K/UL (ref 0–1.3)
MONOCYTES RELATIVE PERCENT: 2.7 %
NEUTROPHILS ABSOLUTE: 12.2 K/UL (ref 1.7–7.7)
NEUTROPHILS RELATIVE PERCENT: 91.3 %
PDW BLD-RTO: 16.4 % (ref 12.4–15.4)
PERFORMED ON: ABNORMAL
PERFORMED ON: ABNORMAL
PHOSPHORUS: 2.6 MG/DL (ref 2.5–4.9)
PLATELET # BLD: 242 K/UL (ref 135–450)
PMV BLD AUTO: 8.1 FL (ref 5–10.5)
POTASSIUM SERPL-SCNC: 5.2 MMOL/L (ref 3.5–5.1)
RBC # BLD: 4.49 M/UL (ref 4–5.2)
SODIUM BLD-SCNC: 139 MMOL/L (ref 136–145)
WBC # BLD: 13.3 K/UL (ref 4–11)

## 2022-07-17 PROCEDURE — 6370000000 HC RX 637 (ALT 250 FOR IP): Performed by: INTERNAL MEDICINE

## 2022-07-17 PROCEDURE — 94761 N-INVAS EAR/PLS OXIMETRY MLT: CPT

## 2022-07-17 PROCEDURE — 80069 RENAL FUNCTION PANEL: CPT

## 2022-07-17 PROCEDURE — 99238 HOSP IP/OBS DSCHRG MGMT 30/<: CPT | Performed by: INTERNAL MEDICINE

## 2022-07-17 PROCEDURE — 85025 COMPLETE CBC W/AUTO DIFF WBC: CPT

## 2022-07-17 PROCEDURE — 94640 AIRWAY INHALATION TREATMENT: CPT

## 2022-07-17 PROCEDURE — 6360000002 HC RX W HCPCS: Performed by: INTERNAL MEDICINE

## 2022-07-17 PROCEDURE — 2700000000 HC OXYGEN THERAPY PER DAY

## 2022-07-17 PROCEDURE — 36415 COLL VENOUS BLD VENIPUNCTURE: CPT

## 2022-07-17 RX ORDER — PREDNISONE 10 MG/1
TABLET ORAL
Qty: 40 TABLET | Refills: 0 | Status: ON HOLD | OUTPATIENT
Start: 2022-07-17 | End: 2022-08-16

## 2022-07-17 RX ADMIN — IPRATROPIUM BROMIDE AND ALBUTEROL SULFATE 1 AMPULE: 2.5; .5 SOLUTION RESPIRATORY (INHALATION) at 07:27

## 2022-07-17 RX ADMIN — SERTRALINE 100 MG: 100 TABLET, FILM COATED ORAL at 08:39

## 2022-07-17 RX ADMIN — LEVOTHYROXINE SODIUM 125 MCG: 125 TABLET ORAL at 05:15

## 2022-07-17 RX ADMIN — PANTOPRAZOLE SODIUM 40 MG: 40 TABLET, DELAYED RELEASE ORAL at 05:16

## 2022-07-17 RX ADMIN — ENOXAPARIN SODIUM 40 MG: 100 INJECTION SUBCUTANEOUS at 08:39

## 2022-07-17 RX ADMIN — TRAMADOL HYDROCHLORIDE 50 MG: 50 TABLET ORAL at 08:39

## 2022-07-17 RX ADMIN — FERROUS SULFATE TAB 325 MG (65 MG ELEMENTAL FE) 325 MG: 325 (65 FE) TAB at 08:39

## 2022-07-17 RX ADMIN — METHYLPREDNISOLONE SODIUM SUCCINATE 60 MG: 125 INJECTION, POWDER, FOR SOLUTION INTRAMUSCULAR; INTRAVENOUS at 05:16

## 2022-07-17 RX ADMIN — NADOLOL 20 MG: 40 TABLET ORAL at 08:39

## 2022-07-17 RX ADMIN — ATORVASTATIN CALCIUM 40 MG: 40 TABLET, FILM COATED ORAL at 08:39

## 2022-07-17 RX ADMIN — ACETAMINOPHEN 650 MG: 325 TABLET ORAL at 10:21

## 2022-07-17 RX ADMIN — TRAMADOL HYDROCHLORIDE 50 MG: 50 TABLET ORAL at 01:00

## 2022-07-17 ASSESSMENT — PAIN DESCRIPTION - ORIENTATION: ORIENTATION: MID;RIGHT;LEFT

## 2022-07-17 ASSESSMENT — PAIN SCALES - GENERAL
PAINLEVEL_OUTOF10: 4
PAINLEVEL_OUTOF10: 7

## 2022-07-17 ASSESSMENT — PAIN DESCRIPTION - LOCATION: LOCATION: BACK

## 2022-07-17 NOTE — PROGRESS NOTES
IM Progress Note    Admit Date:  7/14/2022  3    Interval history:  ILD     Subjective:  Ms. Callaway [employer] seen up in bed, remains on 4 L o2 , feels much improved today and ready to go home  Listening to audible     Objective:   /81   Pulse (!) 111   Temp 97.9 °F (36.6 °C) (Oral)   Resp 20   Ht 5' 3\" (1.6 m)   Wt 134 lb 14.4 oz (61.2 kg)   SpO2 99%   BMI 23.90 kg/m²     Intake/Output Summary (Last 24 hours) at 7/17/2022 0733  Last data filed at 7/17/2022 0521  Gross per 24 hour   Intake --   Output 1425 ml   Net -1425 ml         Physical Exam:      General:  elderly female up in bed,  Awake, alert and oriented. Appears to be not in any distress  Mucous Membranes:  Pink , anicteric  Neck: No JVD, no carotid bruit, no thyromegaly  Chest:  Clear to auscultation bilaterally, with resolving bibasilar crackles  Healing wound on base of neck on left supraclavicular region  Cardiovascular:  RRR S1S2 heard, no murmurs or gallops  Abdomen:  Soft, undistended, non tender, no organomegaly, BS present  Extremities: No edema or cyanosis.  Distal pulses well felt  Kyphosis   Neurological : grossly normal      Medications:   Scheduled Medications:    potassium chloride  40 mEq Oral BID    methylPREDNISolone  60 mg IntraVENous Q8H    ipratropium-albuterol  1 ampule Inhalation TID    atorvastatin  40 mg Oral Daily    ferrous sulfate  325 mg Oral Daily with breakfast    levothyroxine  125 mcg Oral Daily    mirtazapine  30 mg Oral Nightly    nadolol  20 mg Oral Daily    pantoprazole  40 mg Oral QAM AC    Pirfenidone  3 tablet Oral TID    sertraline  100 mg Oral Daily    insulin lispro  0-12 Units SubCUTAneous TID WC    insulin lispro  0-6 Units SubCUTAneous Nightly    sodium chloride flush  5-40 mL IntraVENous 2 times per day    enoxaparin  40 mg SubCUTAneous Daily     I   sodium chloride       albuterol, hydrOXYzine HCl, LORazepam, traMADol, sodium chloride flush, sodium chloride, ondansetron **OR** ondansetron, polyethylene glycol, acetaminophen **OR** acetaminophen    Lab Data:  Recent Labs     07/15/22  0732 07/16/22  0458 07/17/22  0429   WBC 5.2 11.9* 13.3*   HGB 10.8* 10.6* 11.3*   HCT 33.5* 34.1* 36.2   MCV 80.2 80.5 80.7    223 242       Recent Labs     07/15/22  0732 07/16/22  0458 07/17/22  0429    140 139   K 3.9 4.3 5.2*   CL 99 100 100   CO2 32 31 31   PHOS 2.7 2.6 2.6   BUN 7 9 12   CREATININE <0.5* <0.5* <0.5*       Recent Labs     07/14/22  1600   TROPONINI <0.01         Coagulation:   Lab Results   Component Value Date/Time    INR 1.17 07/10/2019 10:00 AM    APTT 25.7 06/16/2014 12:40 PM     Cardiac markers:   Lab Results   Component Value Date/Time    CKTOTAL 23 07/10/2019 04:11 PM    TROPONINI <0.01 07/14/2022 04:00 PM         Lab Results   Component Value Date    ALT 11 07/14/2022    AST 26 07/14/2022    ALKPHOS 138 (H) 07/14/2022    BILITOT 0.9 07/14/2022       Lab Results   Component Value Date    INR 1.17 (H) 07/10/2019    INR 1.38 (H) 06/27/2019    INR 1.42 (H) 06/26/2019    PROTIME 13.3 (H) 07/10/2019    PROTIME 15.7 (H) 06/27/2019    PROTIME 16.0 (H) 06/26/2019       Radiology    Chest xray         U/A:          Lab Results   Component Value Date/Time     COLORU Yellow 05/28/2022 06:39 AM     WBCUA 3-5 05/28/2022 06:39 AM     RBCUA 0-2 05/28/2022 06:39 AM     BACTERIA Rare 05/28/2022 06:39 AM     CLARITYU Clear 05/28/2022 06:39 AM     SPECGRAV >=1.030 05/28/2022 06:39 AM     LEUKOCYTESUR TRACE 05/28/2022 06:39 AM     BLOODU Negative 05/28/2022 06:39 AM     GLUCOSEU 100 05/28/2022 06:39 AM     AMORPHOUS Rare 05/28/2022 06:39 AM         ABG          Lab Results   Component Value Date/Time     ZKW1VPV 20.4 06/26/2019 05:13 PM     BEART -2.9 06/26/2019 05:13 PM     V3ZQFUDV 90.6 06/26/2019 05:13 PM     PHART 7.440 06/26/2019 05:13 PM     CIU2AIU 30.8 06/26/2019 05:13 PM     PO2ART 55.9 06/26/2019 05:13 PM     EUL9MNG 21.4 06/26/2019 05:13 PM         CULTURES  COVID and Flu not detected      EKG: Normal sinus rhythmBaseline artifactLeft ventricular hypertrophyNonspecific ST & T wave changesAbnormal ECGWhen compared with ECG of 04-JUL-2022 02:39,No significant change was foundConfirmed by FAHAD Corona MD ([id number]) on 7/14/2022 6:11:00 PM     RADIOLOGY  XR CHEST PORTABLE   Final Result   No acute cardiopulmonary disease. Stable chronic pulmonary fibrosis. Pertinent previous results reviewed      Echocardiogram from 5/6/22      Summary   Technically difficult examination. Apical views are off axis secondary to pt   left ribs fracture. Left ventricular systolic function is normal with ejection fraction   estimated at 55%. No regional wall motion abnormalities. There is mild concentric left ventricular hypertrophy. Indeterminate left ventricular filling pressure. Systolic pulmonary artery pressure (SPAP) is normal estimated at 22 mmHg   (Right atrial pressure of 8 mmHg).              ASSESSMENT/PLAN:         #Acute on chronic hypoxic respiratory failure  #Pulmonary fibrosis    #Hx of cryptogenic organizing pneumonia with hypersensitivity pneumonitis    -pt normally on 2-4 L O2 at baseline, required 6L here with dyspnea   -  -Pulmonology consulted  -IV solumedrol increased to 60 mg q8h by pulm  -sputum culture pending    -continue to wean as tolerated- now on 4L  -COVID and Flu negative  -CXR stable findings   -dc home on prednisone taper        #Hypokalemia  #Hypomagnesemia  -K initially 2.7, Mg initially 1.5  -replaced       #Chronic diastolic HF  -last echo as above, from 5/6/22 EF 55%  -BNP mildly elevated  -given lasix in ER   -will hold off on further diuresis for now   -daily weights        #DM type 2  -holding oral regimen  - hyperglycemia with steroids  -SSI  -continue to monitor BG     #HTN  -BP stable  -on nadolol     #HLD  -continue statin     #Hypothyroidism  -continue synthroid     #Iron Deficiency Anemia  -continue PO iron  -Hgb stable     #Depression  #Anxiety  -continue zoloft  -prn ativan     #Chronic back pain, lumbar vertebra fracture   -on tramadol    - has f/w UC spine surgery soon      DVT Prophylaxis: lovenox   Diet: ADULT DIET;  Regular; 4 carb choices (60 gm/meal)  Code Status: Full Code            Hipolito Mauricio MD, 7/17/2022 7:33 AM

## 2022-07-17 NOTE — PROGRESS NOTES
RT Inhaler-Nebulizer Bronchodilator Protocol Note    There is a bronchodilator order in the chart from a provider indicating to follow the RT Bronchodilator Protocol and there is an Initiate RT Inhaler-Nebulizer Bronchodilator Protocol order as well (see protocol at bottom of note). CXR Findings:  No results found. The findings from the last RT Protocol Assessment were as follows:   History Pulmonary Disease: Chronic pulmonary disease  Respiratory Pattern: Regular pattern and RR 12-20 bpm  Breath Sounds: Slightly diminished and/or crackles  Cough: Strong, productive  Indication for Bronchodilator Therapy: On home bronchodilators  Bronchodilator Assessment Score: 5    Aerosolized bronchodilator medication orders have been revised according to the RT Inhaler-Nebulizer Bronchodilator Protocol below. Respiratory Therapist to perform RT Therapy Protocol Assessment initially then follow the protocol. Repeat RT Therapy Protocol Assessment PRN for score 0-3 or on second treatment, BID, and PRN for scores above 3. No Indications - adjust the frequency to every 6 hours PRN wheezing or bronchospasm, if no treatments needed after 48 hours then discontinue using Per Protocol order mode. If indication present, adjust the RT bronchodilator orders based on the Bronchodilator Assessment Score as indicated below. Use Inhaler orders unless patient has one or more of the following: on home nebulizer, not able to hold breath for 10 seconds, is not alert and oriented, cannot activate and use MDI correctly, or respiratory rate 25 breaths per minute or more, then use the equivalent nebulizer order(s) with same Frequency and PRN reasons based on the score. If a patient is on this medication at home then do not decrease Frequency below that used at home.     0-3 - enter or revise RT bronchodilator order(s) to equivalent RT Bronchodilator order with Frequency of every 4 hours PRN for wheezing or increased work of breathing using Per Protocol order mode. 4-6 - enter or revise RT Bronchodilator order(s) to two equivalent RT bronchodilator orders with one order with BID Frequency and one order with Frequency of every 4 hours PRN wheezing or increased work of breathing using Per Protocol order mode. 7-10 - enter or revise RT Bronchodilator order(s) to two equivalent RT bronchodilator orders with one order with TID Frequency and one order with Frequency of every 4 hours PRN wheezing or increased work of breathing using Per Protocol order mode. 11-13 - enter or revise RT Bronchodilator order(s) to one equivalent RT bronchodilator order with QID Frequency and an Albuterol order with Frequency of every 4 hours PRN wheezing or increased work of breathing using Per Protocol order mode. Greater than 13 - enter or revise RT Bronchodilator order(s) to one equivalent RT bronchodilator order with every 4 hours Frequency and an Albuterol order with Frequency of every 2 hours PRN wheezing or increased work of breathing using Per Protocol order mode. RT to enter RT Home Evaluation for COPD & MDI Assessment order using Per Protocol order mode.     Electronically signed by Juan Antonio Jacobs RCP on 7/17/2022 at 7:30 AM

## 2022-07-17 NOTE — PLAN OF CARE
Problem: Discharge Planning  Goal: Discharge to home or other facility with appropriate resources  7/17/2022 1004 by Tina Khan RN  Outcome: Completed  7/16/2022 2205 by Rosetta Bianchi RN  Outcome: Progressing     Problem: Safety - Adult  Goal: Free from fall injury  7/17/2022 1004 by Tina Khan RN  Outcome: Completed  7/16/2022 2205 by Rosetta Bianchi RN  Outcome: Progressing  Flowsheets (Taken 7/16/2022 1443 by Tina Khan RN)  Free From Fall Injury: Instruct family/caregiver on patient safety     Problem: ABCDS Injury Assessment  Goal: Absence of physical injury  7/17/2022 1004 by Tina Khan RN  Outcome: Completed  7/16/2022 2205 by Rosetta Bianchi RN  Outcome: Progressing     Problem: Pain  Goal: Verbalizes/displays adequate comfort level or baseline comfort level  7/17/2022 1004 by Tina Khan RN  Outcome: Completed  7/16/2022 2205 by Rosetta Bianchi RN  Outcome: Progressing

## 2022-07-17 NOTE — PROGRESS NOTES
PM assessment completed. Scheduled medications given per MAR. VSS 4 liter NC, A/O x4, denies any needs at this time. Call light in reach, will monitor.

## 2022-07-17 NOTE — PROGRESS NOTES
07/16/22 2000   RT Protocol   History Pulmonary Disease 2   Respiratory pattern 0   Breath sounds 2   Cough 1   Indications for Bronchodilator Therapy On home bronchodilators   Bronchodilator Assessment Score 5   RT Inhaler-Nebulizer Bronchodilator Protocol Note    There is a bronchodilator order in the chart from a provider indicating to follow the RT Bronchodilator Protocol and there is an Initiate RT Inhaler-Nebulizer Bronchodilator Protocol order as well (see protocol at bottom of note). CXR Findings:  No results found. The findings from the last RT Protocol Assessment were as follows:   History Pulmonary Disease: Chronic pulmonary disease  Respiratory Pattern: Regular pattern and RR 12-20 bpm  Breath Sounds: Slightly diminished and/or crackles  Cough: Strong, productive  Indication for Bronchodilator Therapy: On home bronchodilators  Bronchodilator Assessment Score: 5    Aerosolized bronchodilator medication orders have been revised according to the RT Inhaler-Nebulizer Bronchodilator Protocol below. Respiratory Therapist to perform RT Therapy Protocol Assessment initially then follow the protocol. Repeat RT Therapy Protocol Assessment PRN for score 0-3 or on second treatment, BID, and PRN for scores above 3. No Indications - adjust the frequency to every 6 hours PRN wheezing or bronchospasm, if no treatments needed after 48 hours then discontinue using Per Protocol order mode. If indication present, adjust the RT bronchodilator orders based on the Bronchodilator Assessment Score as indicated below. Use Inhaler orders unless patient has one or more of the following: on home nebulizer, not able to hold breath for 10 seconds, is not alert and oriented, cannot activate and use MDI correctly, or respiratory rate 25 breaths per minute or more, then use the equivalent nebulizer order(s) with same Frequency and PRN reasons based on the score.   If a patient is on this medication at home then do not decrease Frequency below that used at home. 0-3 - enter or revise RT bronchodilator order(s) to equivalent RT Bronchodilator order with Frequency of every 4 hours PRN for wheezing or increased work of breathing using Per Protocol order mode. 4-6 - enter or revise RT Bronchodilator order(s) to two equivalent RT bronchodilator orders with one order with BID Frequency and one order with Frequency of every 4 hours PRN wheezing or increased work of breathing using Per Protocol order mode. 7-10 - enter or revise RT Bronchodilator order(s) to two equivalent RT bronchodilator orders with one order with TID Frequency and one order with Frequency of every 4 hours PRN wheezing or increased work of breathing using Per Protocol order mode. 11-13 - enter or revise RT Bronchodilator order(s) to one equivalent RT bronchodilator order with QID Frequency and an Albuterol order with Frequency of every 4 hours PRN wheezing or increased work of breathing using Per Protocol order mode. Greater than 13 - enter or revise RT Bronchodilator order(s) to one equivalent RT bronchodilator order with every 4 hours Frequency and an Albuterol order with Frequency of every 2 hours PRN wheezing or increased work of breathing using Per Protocol order mode.        Electronically signed by Josie Severino RCP on 7/16/2022 at 8:22 PM

## 2022-07-17 NOTE — PLAN OF CARE
Problem: Discharge Planning  Goal: Discharge to home or other facility with appropriate resources  7/16/2022 2205 by Lalit Maxwell RN  Outcome: Progressing  7/16/2022 1219 by Raya Hurd RN  Outcome: Adequate for Discharge     Problem: Safety - Adult  Goal: Free from fall injury  7/16/2022 2205 by Lalit Maxwell RN  Outcome: Monisha Doan (Taken 7/16/2022 1443 by Raya Hurd, RN)  Free From Fall Injury: Manav Waterman family/caregiver on patient safety  7/16/2022 1219 by Raya Hurd RN  Outcome: Adequate for Discharge     Problem: ABCDS Injury Assessment  Goal: Absence of physical injury  7/16/2022 2205 by Lalit Maxwell RN  Outcome: Progressing  7/16/2022 1219 by Raya Hurd RN  Outcome: Adequate for Discharge     Problem: Pain  Goal: Verbalizes/displays adequate comfort level or baseline comfort level  Outcome: Progressing

## 2022-07-17 NOTE — PROGRESS NOTES
Patient is feeling much better this morning she states. She continues on 3.5 liters nasal canula which is the same oxygen amount at home. Peripheral line remains dry/intact and saline locked. She c/o headache this morning, plans to give tylenol. There is a discharge order this morning, her  plans to pick her up. Discharge instructions given, patient v/u of tapering prednisone. VSS. Daughter is at bedside and updated of plan.

## 2022-07-17 NOTE — DISCHARGE INSTR - COC
Continuity of Care Form    Patient Name: Salome    :  1955  MRN:  [id number]    Admit date:  2022  Discharge date:  ***    Code Status Order: Full Code   Advance Directives:     Admitting Physician:  Evelyn Fox MD  PCP: Gladis Zabala MD    Discharging Nurse: [facility] Unit/Room#: /3349-98  Discharging Unit Phone Number: ***    Emergency Contact:   Extended Emergency Contact Information  Primary Emergency Contact: [facility]  Address: [address]           Angélica Armenta  [place] [address] Phone: [phone]  Mobile Phone: [phone]  Relation: Spouse  Secondary Emergency Contact: [facility] Phone: [phone]  Mobile Phone: [phone]  Relation: Child    Past Surgical History:  Past Surgical History:   Procedure Laterality Date    ARM SURGERY Left 3/2/2020    LEFT ULNAR NERVE DECOMPRESSION AT THE ELBOW performed by Chayo Fermin MD at [address] [place] [address] 2019    EBUS WF W/ANES.  performed by Remigio Fay MD at [place] [address]  2019    BRONCHOSCOPY/TRANSBRONCHIAL NEEDLE BIOPSY performed by Remigio Fay MD at [place] [address]  2019    BRONCHOSCOPY/TRANSBRONCHIAL LUNG BIOPSY performed by Remigio Fay MD at [place] [address]  2019    BRONCHOSCOPY ALVEOLAR LAVAGE performed by Remigio Fay MD at [place] [address]  07/10/2019    BRONCHOSCOPY N/A 7/10/2019    BRONCHOSCOPY ALVEOLAR LAVAGE performed by Maria C Sorto MD at [place] [address] Bilateral 2019    BRONCHOSCOPY N/A 2019    BRONCHOSCOPY ALVEOLAR LAVAGE performed by Mo Swain MD at [place] [address] N/A 2019    BRONCHOSCOPY ALVEOLAR LAVAGE performed by Gera Hurd MD at [place] [address], TOTAL ABDOMINAL (CERVIX REMOVED)      partial       Immunization History: Immunization History   Administered Date(s) Administered    COVID-19, MODERNA BLUE border, Primary or Immunocompromised, (age 12y+), IM, 100 mcg/0.5mL 03/25/2021, 04/22/2021    Influenza Vaccine, unspecified formulation 02/13/2016    Influenza Virus Vaccine 01/15/2016, 02/13/2016    Influenza, Quadv, adjuvanted, 65 yrs +, IM, PF (Fluad) 11/23/2020, 11/17/2021    Influenza, Triv, inactivated, subunit, adjuvanted, IM (Fluad 65 yrs and older) 11/14/2019    Pneumococcal Conjugate 13-valent ([id number]) 06/18/2019    Pneumococcal Polysaccharide ([id number]) 11/14/2013, 04/03/2014    Tdap (Boostrix, Adacel) 02/04/2021       Active Problems:  Patient Active Problem List   Diagnosis Code    Chest pain R07.9    HTN (hypertension) I10    Hyperlipidemia with target LDL less than 130 E78.5    Hypokalemia E87.6    Insomnia G47.00    Trochanteric bursitis of both hips M70.61, M70.62    Migraine G43.909    Syncope R55    Acute blood loss anemia D62    Fatigue R53.83    Hypothyroidism E03.9    Mild single current episode of major depressive disorder ([facility]) F32.0    Anxiety F41.9    Pneumonia of both lower lobes due to infectious organism J18.9    A-fib ([facility]) I48.91    Atypical pneumonia J18.9    Acute bronchitis with bronchospasm J20.9    Acute on chronic diastolic CHF (congestive heart failure) ([facility]) I50.33    Class 2 obesity with body mass index (BMI) of 39.0 to 39.9 in adult E66.9, Z68.39    Abnormal chest CT K53.85    Acute diastolic heart failure ([facility]) I50.31    ILD (interstitial lung disease) ([facility]) J84.9    Acute on chronic respiratory failure with hypoxia ([facility]) J96.21    Restrictive lung disease J98.4    Abnormal CT of the chest R93.89    SOB (shortness of breath) R06.02    Acute cystitis without hematuria N30.00    Shortness of breath R06.02    Chronic respiratory failure with hypoxia ([facility]) J96.11    Cryptogenic organizing pneumonia ([facility]) J84.116    Pneumothorax of left lung after biopsy J95.811    COPD (chronic Bearin}  Other Medical Equipment (for information only, NOT a DME order):  {EQUIPMENT:929034369}  Other Treatments: ***    Patient's personal belongings (please select all that are sent with patient):  {CHP DME Belongings:712998391}    RN SIGNATURE:  {Esignature:711152903}    CASE MANAGEMENT/SOCIAL WORK SECTION    Inpatient Status Date: 2022    Readmission Risk Assessment Score:  Readmission Risk              Risk of Unplanned Readmission:  [id number]           Discharging to Facility/ Agency   Name: [facility] home [facility] care for SN,PT/OT    / signature: Electronically signed by Vicky Vasquez RN on 22 at 10:50 AM EDT    PHYSICIAN SECTION    Prognosis: {Prognosis:3186296443}    Condition at Discharge: [address] [place] [address] Patient Condition:993204731}    Rehab Potential (if transferring to Rehab): {Prognosis:7635851027}    Recommended Labs or Other Treatments After Discharge: ***    Physician Certification: I certify the above information and transfer of Brenda Mercado  is necessary for the continuing treatment of the diagnosis listed and that she requires {Admit to Appropriate Level of Care:77335} for {GREATER/LESS:239736007} 30 days.      Update Admission H&P: {CHP DME Changes in WRETL:779593827}    PHYSICIAN SIGNATURE:  Electronically signed by MADISON Funes, RN on 22 at 10:48 AM EDT

## 2022-07-17 NOTE — CARE COORDINATION
DISCHARGE ORDER  Date/Time 2022 11:06 AM  Completed by: Missael Vasquez RN, Case Management    Patient Name: Monik Hollis      : 1955  Admitting Diagnosis: ILD (interstitial lung disease) ([facility] [J84.9]  Interstitial lung disease ([facility] [J84.9]      Admit order Date and Status:inpt  (verify MD's last order for status of admission)      Noted discharge order. If applicable PT/OT recommendation at Discharge: na  DME recommendation by PT/OT:na  Confirmed discharge plan  (pt):    Discharge Plan: Reviewed chart. Met with the pt. Plan continues home with [email or web address]. Writer spoke with on call nurse from [facility] and she states will resume services at d/c today. AVS and hhc orders faxed to [facility]. No other d/c needs voiced or identified. Date of Last IMM Given: 22    Reviewed chart. Role of discharge planner explained and patient verbalized understanding. Discharge order is noted. Has Home O2 in place on admit:  Yes  Informed of need to bring portable home O2 tank on day of discharge for nursing to connect prior to leaving:   Yes  Verbalized agreement/Understanding:   Yes  Pt is being d/c'd to home today. Pt's O2 sats are 99% on 3.5 liters. Discharge timeout done with MICK Gomez. All discharge needs and concerns addressed.

## 2022-07-18 ENCOUNTER — CARE COORDINATION (OUTPATIENT)
Dept: CASE MANAGEMENT | Age: 67
End: 2022-07-18

## 2022-07-18 DIAGNOSIS — J84.9 INTERSTITIAL LUNG DISEASE (HCC): Primary | ICD-10-CM

## 2022-07-18 PROCEDURE — 1111F DSCHRG MED/CURRENT MED MERGE: CPT | Performed by: FAMILY MEDICINE

## 2022-07-18 NOTE — CARE COORDINATION
Emil 45 Transitions Initial Follow Up Call    Call within 2 business days of discharge: Yes    Patient: Lexis Carranza Patient : 1955   MRN: [id number]  Reason for Admission: acute on chronic hypoxic respiratory failure, pulmonary fibrosis, hx of cryptogenic organizing pneumonia with hypersensitivity pneumonitis, hypokalemia, hypomagnesemia, chronic dCHF, DM2, HTN, HLD, hypothyroidism, iron deficiency anemia, depression, anxiety, chronic back pain (f/w UC spine surgery) -> home with [facility]  Discharge Date: 22 RARS: Readmission Risk Score: 27.5      Last Discharge [facility]       Date Complaint Diagnosis Description Type Department Provider    22 Shortness of Breath Acute congestive heart failure, unspecified heart failure type ([facility] . .. ED to Hosp-Admission (Discharged) (ADMITTED) [address] PCU David Sutherland MD; bJ Gallegoss . .. Spoke with: Jerrlyn Saint (patient)    Facility: [place]     Non-face-to-face services provided:  Obtained and reviewed discharge summary and/or continuity of care documents  Communication with home health agencies or other community services the patient is currently using-[facility]  to confirm referral received  Communication with specialists who will assume or re-assume care of the patient's system-specific problems-chart routing to PCP to contact patient to schedule HFU visit   Education of patient/family/caregiver/guardian to support self-management-CHF education  Assessment and support for treatment adherence and medication management-1111F completed    Was this an external facility discharge? No Discharge Facility: NA    Challenges to be reviewed by the provider   Additional needs identified to be addressed with provider: Yes  Needs hosp f/up vv       Method of communication with provider : staff message    Advance Care Planning:   Does patient have an Advance Directive: reviewed and current.      Care Transition Nurse contacted the patient by telephone to perform post hospital discharge assessment. Verified name and  with patient as identifiers. Provided introduction to self, and explanation of the CTN role. CTN reviewed discharge instructions, medical action plan and red flags with patient who verbalized understanding. Patient given an opportunity to ask questions and does not have any further questions or concerns at this time. Were discharge instructions available to patient? Yes. Reviewed appropriate site of care based on symptoms and resources available to patient including: PCP  Specialist  Home health. The patient agrees to contact the PCP office for questions related to their healthcare. Medication reconciliation was performed with patient, who verbalizes understanding of administration of home medications. Was patient discharged with a pulse oximeter? Has one and monitoring. Knows goals and when to call MD.    Reports feeling better. SaO2 99% on 3.5lpm. Nebulizer works well. Has all medications. [facility]. CTN will contact them to confirm referral info received. She will look for call to schedule [facility]. Weight 132#   -weigh daily in the morning at the same time and in the same clothing  -report weight gain of >3#/day or >5#/week  -report increased SOB, edema, abd fullness, difficulty lying flat  -report palpitations, cough, difficulty urinating  -eat a low sodium diet and limit fluid intake to 64 ounces daily     She voices understanding. States she will call PCP office to schedule vv. Reviewed pulm appt as below. She is aware. Denies needs. CTN provided contact information. CTN contacted [facility]. Per Camacho [place], patient referral is in system with patient name Leticia Farris\" and she will be contacted today to schedule [facility]. Plan for follow-up call in 3-5 days based on severity of symptoms and risk factors.     Plan for next call: symptom management-breathing    Care Transitions 24 Hour Call    Schedule Follow Up Appointment with PCP: Completed  Do you have a copy of your discharge instructions?: Yes  Do you have all of your prescriptions and are they filled?: Yes  Have you been contacted by a [facility] Pharmacist?: No  Have you scheduled your follow up appointment?: Yes  Were you discharged with any Home Care or Post Acute Services: Yes  Post Acute Services: Home Health  Patient DME: Kacey perez  Patient Home Equipment: Nebulizer, Oxygen  Do you have support at home?: Partner/Spouse/SO  Do you feel like you have everything you need to keep you well at home?: Yes  Are you an active caregiver in your home?: No  Care Transitions Interventions     Other Services: Completed            Follow Up  Future Appointments   Date Time Provider Calvin Mcmullen   8/3/2022  1:20 PM Calvin [facility]rez [facility]       Netta Solomon RN

## 2022-07-19 DIAGNOSIS — E11.9 TYPE 2 DIABETES MELLITUS WITHOUT COMPLICATION, WITHOUT LONG-TERM CURRENT USE OF INSULIN (HCC): ICD-10-CM

## 2022-07-19 DIAGNOSIS — J06.9 VIRAL URI: ICD-10-CM

## 2022-07-20 PROBLEM — S32.001A BURST FRACTURE OF LUMBAR VERTEBRA (HCC): Status: ACTIVE | Noted: 2022-05-29

## 2022-07-20 RX ORDER — EMPAGLIFLOZIN 25 MG/1
TABLET, FILM COATED ORAL
Qty: 90 TABLET | Refills: 1 | OUTPATIENT
Start: 2022-07-20

## 2022-07-20 RX ORDER — FLUTICASONE PROPIONATE 50 MCG
SPRAY, SUSPENSION (ML) NASAL
Refills: 1 | OUTPATIENT
Start: 2022-07-20

## 2022-07-21 NOTE — DISCHARGE SUMMARY
diastolic HF  -last echo as above, from 5/6/22 EF 55%  -BNP mildly elevated  -given lasix in ER  -held off on further diuresis  -daily weights monitored     #DM type 2  -hyperglycemia with steroids  -SSI  -monitored BG     #HTN  -BP stable  -on nadolol     #HLD  -continue statin     #Hypothyroidism  -continue synthroid     #Iron Deficiency Anemia  -continue PO iron  -Hgb stable     #Depression  #Anxiety  -continue zoloft  -prn ativan     #Chronic back pain, lumbar vertebra fracture   -on tramadol   -has f/w UC spine surgery soon     Procedures (Please Review Full Report for Details)  none    Consults    Pulmonology      Physical Exam at Discharge:  /81   Pulse (!) 111   Temp 97.9 °F (36.6 °C) (Oral)   Resp 20   Ht 5' 3\" (1.6 m)   Wt 134 lb 14.4 oz (61.2 kg)   SpO2 99%   BMI 23.90 kg/m²       General:  elderly female up in bed,  Awake, alert and oriented. Appears to be not in any distress  Mucous Membranes:  Pink , anicteric  Neck: No JVD, no carotid bruit, no thyromegaly  Chest:  Clear to auscultation bilaterally, with resolving bibasilar crackles  Healing wound on base of neck on left supraclavicular region  Cardiovascular:  RRR S1S2 heard, no murmurs or gallops  Abdomen:  Soft, undistended, non tender, no organomegaly, BS present  Extremities: No edema or cyanosis.  Distal pulses well felt  Kyphosis  Neurological : grossly normal        Lab Data:        Recent Labs     07/15/22  0732 07/16/22 0458 07/17/22 0429   WBC 5.2 11.9* 13.3*   HGB 10.8* 10.6* 11.3*   HCT 33.5* 34.1* 36.2   MCV 80.2 80.5 80.7    223 242               Recent Labs     07/15/22  0732 07/16/22 0458 07/17/22  0429    140 139   K 3.9 4.3 5.2*   CL 99 100 100   CO2 32 31 31   PHOS 2.7 2.6 2.6   BUN 7 9 12   CREATININE <0.5* <0.5* <0.5*             Recent Labs     07/14/22  1600   TROPONINI <0.01            Coagulation:         Lab Results   Component Value Date/Time     INR 1.17 07/10/2019 10:00 AM     APTT 25.7 06/16/2014 12:40 PM      Cardiac markers:         Lab Results   Component Value Date/Time     CKTOTAL 23 07/10/2019 04:11 PM     TROPONINI <0.01 07/14/2022 04:00 PM                 Lab Results   Component Value Date     ALT 11 07/14/2022     AST 26 07/14/2022     ALKPHOS 138 (H) 07/14/2022     BILITOT 0.9 07/14/2022         CULTURES  COVID and Flu not detected      EKG:  Normal sinus rhythmBaseline artifactLeft ventricular hypertrophyNonspecific ST & T wave changesAbnormal ECGWhen compared with ECG of 04-JUL-2022 02:39,No significant change was foundConfirmed by FAHAD Childress MD ([id number]) on 7/14/2022 6:11:00 PM     RADIOLOGY  XR CHEST PORTABLE   Final Result   No acute cardiopulmonary disease. Stable chronic pulmonary fibrosis. Discharge Medications     Medication List        START taking these medications      predniSONE 10 MG tablet  Commonly known as: DELTASONE  40mg x 5 days, 30 mg x 5 days, 20 mg x 5 days, 10 mg x 5 days then stop            CONTINUE taking these medications      acetaminophen 325 MG tablet  Commonly known as: TYLENOL     * albuterol (2.5 MG/3ML) 0.083% nebulizer solution  Commonly known as: PROVENTIL  INHALE THE CONTENTS OF 1 VIAL VIA NEBULIZER EVERY 6 HOURS AS NEEDED FOR WHEEZING     * albuterol sulfate  (90 Base) MCG/ACT inhaler  Commonly known as: PROVENTIL;VENTOLIN;PROAIR  INHALE 2 PUFFS INTO THE LUNGS EVERY 6 HOURS AS NEEDED FOR WHEEZING     atorvastatin 40 MG tablet  Commonly known as: LIPITOR  Take 1 tablet by mouth daily     benzonatate 200 MG capsule  Commonly known as: TESSALON  TAKE 1 CAPSULE BY MOUTH 3 TIMES A DAY AS NEEDED FOR COUGH     blood glucose test strips strip  Commonly known as: ASCENSIA AUTODISC VI;ONE TOUCH ULTRA TEST VI  1 each by In Vitro route daily As needed.      [employer] Allergy Relief-D12 5-120 MG per extended release tablet  Generic drug: loratadine-pseudoephedrine  TAKE 1 TABLET BY MOUTH TWICE A DAY     empagliflozin 25 MG tablet  Commonly known as: JARDIANCE  Take 1 tablet by mouth daily     ferrous sulfate 325 (65 Fe) MG tablet  Commonly known as: IRON 325  Take 1 tablet by mouth daily (with breakfast)     furosemide 40 MG tablet  Commonly known as: LASIX  Take 1 tablet by mouth daily     guaiFENesin 600 MG extended release tablet  Commonly known as: MUCINEX     hydrOXYzine HCl 10 MG tablet  Commonly known as: ATARAX  Take 1 tablet by mouth every 8 hours as needed for Itching TAKE 1 TO 3 TABLETS BY MOUTH 3 TIMES DAILY AS NEEDED FOR ITCHING     INSULIN SYRINGE 1CC/29G 29G X 1/2\" 1 ML Misc  1 each by Does not apply route 2 times daily     levothyroxine 125 MCG tablet  Commonly known as: SYNTHROID  Take 1 tablet by mouth Daily TAKE 1 TABLET BY MOUTH EVERY DAY     LORazepam 0.5 MG tablet  Commonly known as: ATIVAN     mirtazapine 30 MG tablet  Commonly known as: REMERON  Take 1 tablet by mouth nightly     nadolol 20 MG tablet  Commonly known as: CORGARD  Take 1 tablet by mouth daily     naloxone 4 MG/0.1ML Liqd nasal spray     OXYGEN     pantoprazole 40 MG tablet  Commonly known as: PROTONIX  Take 1 tablet by mouth every morning (before breakfast)     Pirfenidone 267 MG Tabs     potassium chloride 10 MEQ extended release tablet  Commonly known as: KLOR-CON     sertraline 100 MG tablet  Commonly known as: ZOLOFT  Take 2 tablets by mouth daily     SITagliptin 100 MG tablet  Commonly known as: Januvia  TAKE 1 TABLET BY MOUTH EVERY DAY     traMADol 50 MG tablet  Commonly known as: Ultram  Take 1-2 tablets by mouth every 6 hours as needed for Pain for up to 180 days. Take lowest dose possible to manage pain     True Metrix Level 1 Low Soln  use as needed     True Metrix Meter Lindsay  Test daily and as needed     TRUEplus Lancets 33G Misc  Use daily           * This list has 2 medication(s) that are the same as other medications prescribed for you. Read the directions carefully, and ask your doctor or other care provider to review them with you.                    Where to Get Your Medications        These medications were sent to [facility]/pharmacy #[id number]- [place], [place] - [address] -  [phone] - F [phone]  [address] [address]      Phone: [phone]   predniSONE 10 MG tablet           Discharged in stable condition to home    Follow Up: Follow up with PCP in 1 week. Pulmonology as discussed.     Helena Dahl MD, 8/24/2022 6:49 AM

## 2022-07-22 ENCOUNTER — CARE COORDINATION (OUTPATIENT)
Dept: CASE MANAGEMENT | Age: 67
End: 2022-07-22

## 2022-07-22 NOTE — CARE COORDINATION
Emil 45 Transitions Follow Up Call    2022    Patient: Fracisco Vazquez  Patient : 1955   MRN: [id number]  Reason for Admission: acute on chronic hypoxic respiratory failure, pulmonary fibrosis, hx of cryptogenic organizing pneumonia with hypersensitivity pneumonitis, hypokalemia, hypomagnesemia, chronic dCHF, DM2, HTN, HLD, hypothyroidism, iron deficiency anemia, depression, anxiety, chronic back pain (f/w UC spine surgery) -> home with [facility]  Discharge Date: 22 RARS: Readmission Risk Score: 27.5    CTN attempted follow-up outreach to patient. Message left including CTN contact information for return call.        Follow Up  Future Appointments   Date Time Provider Calvin Mcmullen   8/3/2022  1:20 PM Ani Panda MD [facility]O Lopez RN

## 2022-07-26 RX ORDER — PREDNISONE 1 MG/1
TABLET ORAL
Qty: 53 TABLET | Refills: 0 | OUTPATIENT
Start: 2022-07-26

## 2022-07-27 ENCOUNTER — CARE COORDINATION (OUTPATIENT)
Dept: CASE MANAGEMENT | Age: 67
End: 2022-07-27

## 2022-07-27 NOTE — CARE COORDINATION
[facility]    2022    Patient: Brenda Mercado  Patient : 1955   MRN: [id number]  Reason for Admission: acute on chronic hypoxic respiratory failure, pulmonary fibrosis, hx of cryptogenic organizing pneumonia with hypersensitivity pneumonitis, hypokalemia, hypomagnesemia, chronic dCHF, DM2, HTN, HLD, hypothyroidism, iron deficiency anemia, depression, anxiety, chronic back pain (f/w UC spine surgery) -> home with [facility]  Discharge Date: 22 RARS: Readmission Risk Score: 27.5       Spoke with: Juan Diego Akins (patient)    Needs to be reviewed by the provider   Additional needs identified to be addressed with provider: no         Method of communication with provider : none    Care Transition Nurse (CTN) contacted the patient by telephone to follow up after recent hospital admission. Addressed changes since last contact: none  Discussed follow-up appointments. If no appointment was previously scheduled, appointment scheduling offered: No.   Non-[facility] follow up appointment(s): NA    Discussed appropriate site of care based on symptoms and resources available to patient including: PCP  Specialist  Home health. The patient agrees to contact the PCP office for questions related to their healthcare. Reports she feels well and without complaint today. Denies needs. CTN provided contact information. Plan for follow-up call in 10-14 days based on severity of symptoms and risk factors.   Plan for next call: symptom management-         Follow Up  Future Appointments   Date Time Provider Calvin Mcmullen   8/3/2022  1:20 PM Todd Montenegro MD [facility]       Antolin Mann RN

## 2022-08-06 ENCOUNTER — HOSPITAL ENCOUNTER (EMERGENCY)
Age: 67
Discharge: HOME | End: 2022-08-06
Attending: EMERGENCY MEDICINE
Payer: MEDICARE

## 2022-08-06 DIAGNOSIS — R04.0 EPISTAXIS: Primary | ICD-10-CM

## 2022-08-06 PROCEDURE — 30901 CONTROL OF NOSEBLEED: CPT

## 2022-08-06 PROCEDURE — 96372 THER/PROPH/DIAG INJ SC/IM: CPT

## 2022-08-06 PROCEDURE — 2500000003 HC RX 250 WO HCPCS

## 2022-08-06 PROCEDURE — 99284 EMERGENCY DEPT VISIT MOD MDM: CPT

## 2022-08-06 PROCEDURE — 6360000002 HC RX W HCPCS: Performed by: EMERGENCY MEDICINE

## 2022-08-06 RX ORDER — TRANEXAMIC ACID 100 MG/ML
15 INJECTION, SOLUTION INTRAVENOUS ONCE
Status: COMPLETED | OUTPATIENT
Start: 2022-08-06 | End: 2022-08-06

## 2022-08-06 RX ORDER — TRANEXAMIC ACID 100 MG/ML
INJECTION, SOLUTION INTRAVENOUS
Status: COMPLETED
Start: 2022-08-06 | End: 2022-08-06

## 2022-08-06 RX ORDER — ONDANSETRON 2 MG/ML
4 INJECTION INTRAMUSCULAR; INTRAVENOUS ONCE
Status: COMPLETED | OUTPATIENT
Start: 2022-08-06 | End: 2022-08-06

## 2022-08-06 RX ADMIN — ONDANSETRON HYDROCHLORIDE 4 MG: 2 INJECTION, SOLUTION INTRAMUSCULAR; INTRAVENOUS at 22:30

## 2022-08-06 RX ADMIN — TRANEXAMIC ACID 899 MG: 100 INJECTION, SOLUTION INTRAVENOUS at 21:42

## 2022-08-06 RX ADMIN — TRANEXAMIC ACID 899 MG: 100 INJECTION INTRAVENOUS at 21:42

## 2022-08-06 ASSESSMENT — PAIN - FUNCTIONAL ASSESSMENT: PAIN_FUNCTIONAL_ASSESSMENT: NONE - DENIES PAIN

## 2022-08-06 ASSESSMENT — LIFESTYLE VARIABLES: HOW OFTEN DO YOU HAVE A DRINK CONTAINING ALCOHOL: NEVER

## 2022-08-07 VITALS
HEART RATE: 90 BPM | TEMPERATURE: 97.8 F | WEIGHT: 132 LBS | RESPIRATION RATE: 18 BRPM | DIASTOLIC BLOOD PRESSURE: 78 MMHG | HEIGHT: 63 IN | SYSTOLIC BLOOD PRESSURE: 136 MMHG | OXYGEN SATURATION: 95 % | BODY MASS INDEX: 23.39 KG/M2

## 2022-08-07 ASSESSMENT — ENCOUNTER SYMPTOMS
ABDOMINAL DISTENTION: 0
COUGH: 0
NAUSEA: 0
RHINORRHEA: 0
WHEEZING: 0
DIARRHEA: 0
VOMITING: 0
BACK PAIN: 0
SHORTNESS OF BREATH: 0
PHOTOPHOBIA: 0

## 2022-08-07 NOTE — DISCHARGE INSTRUCTIONS
Your left nostril was packed for nosebleed. You need to have this removed in approximately 48 hours. Make sure you follow-up with ear nose and throat on Monday or Tuesday at the latest if you are unable to arrange appointment you can return to emergency department. If your other nostril starts bleeding recommend applying pressure as we discussed. Return to the emergency department if any other emergent concerns. Good luck on your surgery!

## 2022-08-07 NOTE — ED NOTES
Pt discharged at this time and notified them to make sure to notify them to followup with ENT on Monday or Tuesday to have rhino rocket removed.      Gilberto Ferguson RN  08/06/22 2095

## 2022-08-07 NOTE — ED TRIAGE NOTES
Patient to the ED via EMS for epistaxis x45 minutes. Patient reports episode x1 this AM lasting approximately 20 minutes. No trauma noted. No pain reported. Clamp applied to the nose at triage. Patient is alert and oriented on arrival. Nebulizer x1 given by EMS in route.

## 2022-08-07 NOTE — ED PROVIDER NOTES
Emergency Department Provider Note  Location: [address] EMERGENCY DEPARTMENT  8/6/2022     Patient Identification  Brenda Mercado is a 69 y.o. female    Chief Complaint  Epistaxis          HPI  (History provided by patient and spouse/SO)  Patient is a 78-year-old female with history of A. fib denies blood thinners or anticoagulation who presents for epistaxis recurrent episode over the past 24 hours. Patient reports had mild nosebleed this morning resolved with pressure and then returned this evening. Patient is on chronic nasal cannula oxygen for COPD. Reports mild nausea but denies any lightheadedness or fatigue. No exacerbating alleviating factors. I have reviewed the following nursing documentation:  Allergies: Allergies   Allergen Reactions    Penicillins Anaphylaxis     Tolerates Meropenem. Cefuroxime      Tolerates Meropenem. Doxycycline Hives    Imitrex [Sumatriptan] Other (See Comments)     Feels like pins and needles    Meperidine     Other Other (See Comments)     Blood pressure drops, light headed    Sulfa Antibiotics Hives    Keflex [Cephalexin] Itching and Rash     Tolerates Meropenem. Tape Erich Standing Tape] Rash       Past medical history:  has a past medical history of A-fib ([address] Anxiety, Cryptogenic organizing pneumonia ([address] Depression, GERD (gastroesophageal reflux disease), Hyperlipidemia, On home oxygen therapy, Rash, and Thyroid disease. Past surgical history:  has a past surgical history that includes Cholecystectomy; bronchoscopy (N/A, 6/27/2019); bronchoscopy (6/27/2019); bronchoscopy (6/27/2019); bronchoscopy (6/27/2019); bronchoscopy (07/10/2019); bronchoscopy (N/A, 7/10/2019); Hysterectomy, total abdominal; bronchoscopy (Bilateral, 08/06/2019); bronchoscopy (N/A, 8/6/2019); bronchoscopy (N/A, 12/24/2019); and Arm Surgery (Left, 3/2/2020). Home medications:   Prior to Admission medications    Medication Sig Start Date End Date Taking?  Authorizing Provider predniSONE (DELTASONE) 10 MG tablet 40mg x 5 days, 30 mg x 5 days, 20 mg x 5 days, 10 mg x 5 days then stop 7/17/22   Allan Collins MD   albuterol sulfate HFA (PROVENTIL;VENTOLIN;PROAIR) 108 (90 Base) MCG/ACT inhaler INHALE 2 PUFFS INTO THE LUNGS EVERY 6 HOURS AS NEEDED FOR WHEEZING 7/13/22   Ani Campbell MD   Pirfenidone 267 MG TABS Take 3 tablets by mouth in the morning, at noon, and at bedtime    Historical Provider, MD   atorvastatin (LIPITOR) 40 MG tablet Take 1 tablet by mouth daily 5/27/22   Tamera Bruno MD   levothyroxine (SYNTHROID) 125 MCG tablet Take 1 tablet by mouth Daily TAKE 1 TABLET BY MOUTH EVERY DAY 5/27/22   Tamera Bruno MD   sertraline (ZOLOFT) 100 MG tablet Take 2 tablets by mouth daily 5/27/22   Tamera Bruno MD   mirtazapine (REMERON) 30 MG tablet Take 1 tablet by mouth nightly 5/27/22   Tamera Bruno MD   nadolol (CORGARD) 20 MG tablet Take 1 tablet by mouth daily 5/27/22   Tamera Bruno MD   pantoprazole (PROTONIX) 40 MG tablet Take 1 tablet by mouth every morning (before breakfast) 5/27/22   Tamera Bruno MD   empagliflozin (JARDIANCE) 25 MG tablet Take 1 tablet by mouth daily 5/27/22   Tamera Bruno MD   loratadine-pseudoephedrine ([employer] ALLERGY RELIEF-D12) 5-120 MG per extended release tablet TAKE 1 TABLET BY MOUTH TWICE A DAY 5/27/22   Tamera Bruno MD   traMADol (ULTRAM) 50 MG tablet Take 1-2 tablets by mouth every 6 hours as needed for Pain for up to 180 days.  Take lowest dose possible to manage pain 5/27/22 11/23/22  Tamera Bruno MD   benzonatate (TESSALON) 200 MG capsule TAKE 1 CAPSULE BY MOUTH 3 TIMES A DAY AS NEEDED FOR COUGH 5/27/22   Tamera Bruno MD   SITagliptin (JANUVIA) 100 MG tablet TAKE 1 TABLET BY MOUTH EVERY DAY 5/27/22   Tamera Bruno MD   ferrous sulfate (IRON 325) 325 (65 Fe) MG tablet Take 1 tablet by mouth daily (with breakfast) 5/12/22   Allan Collins MD   hydrOXYzine (ATARAX) 10 MG tablet Take 1 tablet by mouth every 8 hours as needed for Itching TAKE 1 TO 3 TABLETS BY MOUTH 3 TIMES DAILY AS NEEDED FOR ITCHING 5/8/22   Nathanel Flavors, APRN - CNP   furosemide (LASIX) 40 MG tablet Take 1 tablet by mouth daily 5/9/22   Nathanel Flavors, APRN - CNP   acetaminophen (TYLENOL) 325 MG tablet TAKE 3 TABLETS (975 MG TOTAL) BY MOUTH 3 TIMES A DAY AS NEEDED FOR PAIN. 4/21/22   Historical Provider, MD   guaiFENesin (MUCINEX) 600 MG extended release tablet Take 1,200 mg by mouth every morning    Historical Provider, MD   naloxone 4 MG/0.1ML LIQD nasal spray SPRAY ONCE INTO ONE NOSTRIL IF NEEDED. CALL 911. REPEAT DOSE IN OTHER NOSTRIL IF NO RESPONSE IN 3MIN. 4/21/22   Historical Provider, MD   potassium chloride (KLOR-CON) 10 MEQ extended release tablet Take 20 mEq by mouth daily as needed    Historical Provider, MD   LORazepam (ATIVAN) 0.5 MG tablet Take 0.5 mg by mouth every 6 hours as needed for Anxiety. Historical Provider, MD   albuterol (PROVENTIL) (2.5 MG/3ML) 0.083% nebulizer solution INHALE THE CONTENTS OF 1 VIAL VIA NEBULIZER EVERY 6 HOURS AS NEEDED FOR WHEEZING 2/7/22   Kiersten Kingsley MD   Blood Glucose Monitoring Suppl (TRUE METRIX METER) PUJA Test daily and as needed 9/10/21   Kiersten Kingsley MD   blood glucose test strips (ASCENSIA AUTODISC VI;ONE TOUCH ULTRA TEST VI) strip 1 each by In Vitro route daily As needed. 9/10/21   Kiersten Kingsley MD   TRUEplus Lancets 33G MISC Use daily 9/10/21   Kiersten Kingsley MD   Blood Glucose Calibration (TRUE METRIX LEVEL 1) Low SOLN use as needed 9/9/21   Kiersten Kingsley MD   INSULIN SYRINGE 1CC/29G 29G X 1/2\" 1 ML MISC 1 each by Does not apply route 2 times daily 3/6/20   Kiersten Kingsley MD   OXYGEN Inhale 3 L into the lungs 7/14/19   Historical Provider, MD       Social history:  reports that she has never smoked. She has never used smokeless tobacco. She reports that she does not drink alcohol and does not use drugs.     Family history:    Family History   Problem Relation Age of Onset    Diabetes Mother     High Blood Pressure Mother     Asthma Sister     Other Father          ROS  Review of Systems   Constitutional:  Negative for chills and fever. HENT:  Positive for nosebleeds. Negative for congestion and rhinorrhea. Eyes:  Negative for photophobia and visual disturbance. Respiratory:  Negative for cough, shortness of breath and wheezing. Cardiovascular:  Negative for chest pain and palpitations. Gastrointestinal:  Negative for abdominal distention, diarrhea, nausea and vomiting. Genitourinary:  Negative for dysuria and hematuria. Musculoskeletal:  Negative for back pain and neck pain. Skin:  Negative for rash and wound. Neurological:  Negative for syncope and weakness. Psychiatric/Behavioral:  Negative for agitation and confusion. Exam  ED Triage Vitals   BP Temp Temp src Pulse Resp SpO2 Height Weight   -- -- -- -- -- -- -- --       Physical Exam  Vitals and nursing note reviewed. Constitutional:       General: She is not in acute distress. Appearance: She is well-developed. HENT:      Head: Normocephalic and atraumatic. Nose:      Comments: No septal hematoma. Oozing bleed fairly high up in the left nare with no focal source identified. There is also mild oozing bleed from the right nare. There is no blood in the posterior oropharynx. Eyes:      General: No scleral icterus. Extraocular Movements: Extraocular movements intact. Pupils: Pupils are equal, round, and reactive to light. Cardiovascular:      Rate and Rhythm: Normal rate and regular rhythm. Heart sounds: No murmur heard. Pulmonary:      Effort: Pulmonary effort is normal.      Breath sounds: Normal breath sounds. Musculoskeletal:         General: No deformity. Normal range of motion. Cervical back: Normal range of motion and neck supple. Skin:     General: Skin is warm. Findings: No rash.    Neurological:      Mental Status: She is alert and oriented to person, place, and time. Motor: No abnormal muscle tone. Coordination: Coordination normal.   Psychiatric:         Mood and Affect: Mood normal.         Behavior: Behavior normal.         ED Course    ED Medication Orders (From admission, onward)      Start Ordered     Status Ordering Provider    08/06/22 2245 08/06/22 2227  ondansetron (ZOFRAN) injection 4 mg  ONCE         Last MAR action: Given - by Shonna Castellon on 08/06/22 at [address], [address]    08/06/22 2200 08/06/22 2142  tranexamic acid (CYKLOKAPRON) injection 899 mg  ONCE         Last MAR action: Given - by Nikko Beard on 08/06/22 at [address], [id number] [address]                Radiology  No results found. Labs  No results found for this visit on 08/06/22. Procedures  Epistaxis Mgmt    Date/Time: 8/7/2022 1:03 AM  Performed by: Julio Goyal MD  Authorized by: Julio Goyal MD     Consent:     Consent obtained:  Verbal    Consent given by:  Patient    Risks, benefits, and alternatives were discussed: yes      Risks discussed:  Bleeding, infection, nasal injury and pain    Alternatives discussed:  No treatment and alternative treatment  Universal protocol:     Procedure explained and questions answered to patient or proxy's satisfaction: yes      Relevant documents present and verified: yes      Patient identity confirmed:  Verbally with patient and arm band  Anesthesia:     Anesthesia method:  None  Procedure details:     Treatment site:  L anterior    Treatment method:  Nasal balloon    Treatment episode: recurring    Post-procedure details:     Assessment:  Bleeding stopped    Procedure completion:  Tolerated      MDM  Patient seen and evaluated. Relevant records reviewed. - Patient is 69 y.o. female presented for epistaxis as noted above  - Exam showed as noted above  - Workup here stepwise approach unable to achieve hemostasis by conservative treatment and not appropriate for cauterization therefore was treated with a Rhino Rocket. may contain errors related to that system including errors in grammar, punctuation, and spelling, as well as words and phrases that may be inappropriate. If there are any questions or concerns please feel free to contact the dictating provider for clarification.      MD Nilda Whitney MD  08/07/22 [id number]

## 2022-08-08 ENCOUNTER — OFFICE VISIT (OUTPATIENT)
Dept: ENT CLINIC | Age: 67
End: 2022-08-08
Payer: MEDICARE

## 2022-08-08 ENCOUNTER — OFFICE VISIT (OUTPATIENT)
Dept: PULMONOLOGY | Age: 67
End: 2022-08-08
Payer: MEDICARE

## 2022-08-08 VITALS
DIASTOLIC BLOOD PRESSURE: 80 MMHG | SYSTOLIC BLOOD PRESSURE: 134 MMHG | HEIGHT: 62 IN | HEART RATE: 86 BPM | BODY MASS INDEX: 24.29 KG/M2 | WEIGHT: 132 LBS | TEMPERATURE: 97.6 F

## 2022-08-08 VITALS
BODY MASS INDEX: 23.38 KG/M2 | HEIGHT: 63 IN | DIASTOLIC BLOOD PRESSURE: 80 MMHG | OXYGEN SATURATION: 97 % | HEART RATE: 86 BPM | SYSTOLIC BLOOD PRESSURE: 134 MMHG | RESPIRATION RATE: 18 BRPM

## 2022-08-08 DIAGNOSIS — R04.0 EPISTAXIS: Primary | ICD-10-CM

## 2022-08-08 DIAGNOSIS — J84.9 ILD (INTERSTITIAL LUNG DISEASE) (HCC): Primary | ICD-10-CM

## 2022-08-08 DIAGNOSIS — J34.89 NASAL SEPTAL PERFORATION: ICD-10-CM

## 2022-08-08 PROCEDURE — 1090F PRES/ABSN URINE INCON ASSESS: CPT | Performed by: STUDENT IN AN ORGANIZED HEALTH CARE EDUCATION/TRAINING PROGRAM

## 2022-08-08 PROCEDURE — 1123F ACP DISCUSS/DSCN MKR DOCD: CPT | Performed by: STUDENT IN AN ORGANIZED HEALTH CARE EDUCATION/TRAINING PROGRAM

## 2022-08-08 PROCEDURE — 99203 OFFICE O/P NEW LOW 30 MIN: CPT | Performed by: STUDENT IN AN ORGANIZED HEALTH CARE EDUCATION/TRAINING PROGRAM

## 2022-08-08 PROCEDURE — G8427 DOCREV CUR MEDS BY ELIG CLIN: HCPCS | Performed by: INTERNAL MEDICINE

## 2022-08-08 PROCEDURE — 3017F COLORECTAL CA SCREEN DOC REV: CPT | Performed by: STUDENT IN AN ORGANIZED HEALTH CARE EDUCATION/TRAINING PROGRAM

## 2022-08-08 PROCEDURE — 99214 OFFICE O/P EST MOD 30 MIN: CPT | Performed by: INTERNAL MEDICINE

## 2022-08-08 PROCEDURE — G8420 CALC BMI NORM PARAMETERS: HCPCS | Performed by: INTERNAL MEDICINE

## 2022-08-08 PROCEDURE — G8400 PT W/DXA NO RESULTS DOC: HCPCS | Performed by: STUDENT IN AN ORGANIZED HEALTH CARE EDUCATION/TRAINING PROGRAM

## 2022-08-08 PROCEDURE — 31238 NSL/SINS NDSC SRG NSL HEMRRG: CPT | Performed by: STUDENT IN AN ORGANIZED HEALTH CARE EDUCATION/TRAINING PROGRAM

## 2022-08-08 PROCEDURE — 1090F PRES/ABSN URINE INCON ASSESS: CPT | Performed by: INTERNAL MEDICINE

## 2022-08-08 PROCEDURE — G8427 DOCREV CUR MEDS BY ELIG CLIN: HCPCS | Performed by: STUDENT IN AN ORGANIZED HEALTH CARE EDUCATION/TRAINING PROGRAM

## 2022-08-08 PROCEDURE — 3017F COLORECTAL CA SCREEN DOC REV: CPT | Performed by: INTERNAL MEDICINE

## 2022-08-08 PROCEDURE — 1036F TOBACCO NON-USER: CPT | Performed by: STUDENT IN AN ORGANIZED HEALTH CARE EDUCATION/TRAINING PROGRAM

## 2022-08-08 PROCEDURE — G8400 PT W/DXA NO RESULTS DOC: HCPCS | Performed by: INTERNAL MEDICINE

## 2022-08-08 PROCEDURE — 1123F ACP DISCUSS/DSCN MKR DOCD: CPT | Performed by: INTERNAL MEDICINE

## 2022-08-08 PROCEDURE — 1111F DSCHRG MED/CURRENT MED MERGE: CPT | Performed by: INTERNAL MEDICINE

## 2022-08-08 PROCEDURE — 1111F DSCHRG MED/CURRENT MED MERGE: CPT | Performed by: STUDENT IN AN ORGANIZED HEALTH CARE EDUCATION/TRAINING PROGRAM

## 2022-08-08 PROCEDURE — 1036F TOBACCO NON-USER: CPT | Performed by: INTERNAL MEDICINE

## 2022-08-08 PROCEDURE — G8420 CALC BMI NORM PARAMETERS: HCPCS | Performed by: STUDENT IN AN ORGANIZED HEALTH CARE EDUCATION/TRAINING PROGRAM

## 2022-08-08 ASSESSMENT — ENCOUNTER SYMPTOMS
SHORTNESS OF BREATH: 1
COUGH: 0
EYE PAIN: 0
RHINORRHEA: 0
NAUSEA: 0
VOMITING: 0

## 2022-08-08 NOTE — PROGRESS NOTES
[facility]      Patient Name: [address] [place] [address] Record Number:  [id number]  Primary Care Physician:  Danny Lopez MD  Date of Consultation: 8/8/2022    Chief Complaint:   Chief Complaint   Patient presents with    Epistaxis     Patient is here today for nose bleeds. Patient was seen in the ED on 8/6 for nose bleed. Patient needs rhino rocket removed. They say the oxygen is the reason she is getting ose bleeds all of a sudden. HISTORY OF PRESENT ILLNESS  Segundo Teran is a(n) 69 y.o. female who presents for evaluation of nosebleeds. She states that she had a nosebleed over the weekend and required packing. She is on nasal oxygen. She not on any anticoagulation. She has never had any serious nosebleeds before. She not had any nose or sinus surgeries.       Patient Active Problem List   Diagnosis    Chest pain    HTN (hypertension)    Hyperlipidemia with target LDL less than 130    Hypokalemia    Insomnia    Trochanteric bursitis of both hips    Migraine    Syncope    Acute blood loss anemia    Fatigue    Hypothyroidism    Mild single current episode of major depressive disorder (HCC)    Anxiety    Pneumonia of both lower lobes due to infectious organism    A-fib ([address]    Atypical pneumonia    Acute bronchitis with bronchospasm    Acute on chronic diastolic CHF (congestive heart failure) ([facility])    Class 2 obesity with body mass index (BMI) of 39.0 to 39.9 in adult    Abnormal chest CT    Acute diastolic heart failure (HCC)    ILD (interstitial lung disease) (HCC)    Acute on chronic respiratory failure with hypoxia (HCC)    Restrictive lung disease    Abnormal CT of the chest    SOB (shortness of breath)    Acute cystitis without hematuria    Shortness of breath    Chronic respiratory failure with hypoxia (HCC)    Cryptogenic organizing pneumonia ([address]    Pneumothorax of left lung after biopsy    COPD (chronic obstructive pulmonary disease) ([facility]    Type 2 diabetes mellitus without complication (HCC)    Hyponatremia    Numbness and tingling in left hand    Ulnar neuropathy at elbow of left upper extremity    Acute congestive heart failure (HCC)    Left wrist pain    Left wrist tendinitis    GERD (gastroesophageal reflux disease)    Toxic metabolic encephalopathy    VIRGINIE (acute kidney injury) ([facility]    Lumbar radiculopathy    Acute on chronic respiratory failure (HCC)    Rib pain on left side    Elevated brain natriuretic peptide (BNP) level    Fracture of multiple ribs of left side    Interstitial lung disease (HCC)    Hypomagnesemia    Depression    Chronic diastolic congestive heart failure (HCC)    Burst fracture of lumbar vertebra [facility])     Past Surgical History:   Procedure Laterality Date    ARM SURGERY Left 3/2/2020    LEFT ULNAR NERVE DECOMPRESSION AT THE ELBOW performed by Sylvain Pacheco MD at [facility] N/A 6/27/2019    EBUS WF W/ANES.  performed by Beth Mix MD at [facility]  6/27/2019    BRONCHOSCOPY/TRANSBRONCHIAL NEEDLE BIOPSY performed by Beth Mix MD at [facility]  6/27/2019    BRONCHOSCOPY/TRANSBRONCHIAL LUNG BIOPSY performed by Beth Mix MD at [facility]  6/27/2019    BRONCHOSCOPY ALVEOLAR LAVAGE performed by Beth Mix MD at [facility]  07/10/2019    BRONCHOSCOPY N/A 7/10/2019    BRONCHOSCOPY ALVEOLAR LAVAGE performed by Alexandria Lobo MD at [facility] Bilateral 08/06/2019    BRONCHOSCOPY N/A 8/6/2019    BRONCHOSCOPY ALVEOLAR LAVAGE performed by Didier Ma MD at [facility] N/A 12/24/2019    BRONCHOSCOPY ALVEOLAR LAVAGE performed by Starr Molina MD at [facility], TOTAL ABDOMINAL (CERVIX REMOVED)      partial     Family History   Problem Relation Age of Onset    Diabetes Mother     High Blood Pressure Mother Asthma Sister     Other Father      Social History     Socioeconomic History    Marital status:      Spouse name: Not on file    Number of children: 7    Years of education: Not on file    Highest education level: Not on file   Occupational History    Not on file   Tobacco Use    Smoking status: Never    Smokeless tobacco: Never   Vaping Use    Vaping Use: Never used   Substance and Sexual Activity    Alcohol use: No    Drug use: No    Sexual activity: Not Currently   Other Topics Concern    Not on file   Social History Narrative    Not on file     Social Determinants of Health     Financial Resource Strain: Not on file   Food Insecurity: Not on file   Transportation Needs: Not on file   Physical Activity: Not on file   Stress: Not on file   Social Connections: Not on file   Intimate Partner Violence: Not on file   Housing Stability: Not on file       DRUG/FOOD ALLERGIES: Penicillins, Cefuroxime, Doxycycline, Imitrex [sumatriptan], Meperidine, Other, Sulfa antibiotics, Keflex [cephalexin], and Tape [adhesive tape]    CURRENT MEDICATIONS  Prior to Admission medications    Medication Sig Start Date End Date Taking?  Authorizing Provider   predniSONE (DELTASONE) 10 MG tablet 40mg x 5 days, 30 mg x 5 days, 20 mg x 5 days, 10 mg x 5 days then stop 7/17/22  Yes Kerri Hernandez MD   albuterol sulfate HFA (PROVENTIL;VENTOLIN;PROAIR) 108 (90 Base) MCG/ACT inhaler INHALE 2 PUFFS INTO THE LUNGS EVERY 6 HOURS AS NEEDED FOR WHEEZING 7/13/22  Yes Ani Cardoza MD   Pirfenidone 267 MG TABS Take 3 tablets by mouth in the morning, at noon, and at bedtime   Yes Historical Provider, MD   atorvastatin (LIPITOR) 40 MG tablet Take 1 tablet by mouth daily 5/27/22  Yes Carlos Quesada MD   levothyroxine (SYNTHROID) 125 MCG tablet Take 1 tablet by mouth Daily TAKE 1 TABLET BY MOUTH EVERY DAY 5/27/22  Yes Carlos Quesada MD   sertraline (ZOLOFT) 100 MG tablet Take 2 tablets by mouth daily 5/27/22  Yes Carlos Quesada MD mirtazapine (REMERON) 30 MG tablet Take 1 tablet by mouth nightly 5/27/22  Yes Shanika Porter MD   nadolol (CORGARD) 20 MG tablet Take 1 tablet by mouth daily 5/27/22  Yes Shanika Porter MD   pantoprazole (PROTONIX) 40 MG tablet Take 1 tablet by mouth every morning (before breakfast) 5/27/22  Yes Shanika Porter MD   empagliflozin (JARDIANCE) 25 MG tablet Take 1 tablet by mouth daily 5/27/22  Yes Shanika Porter MD   loratadine-pseudoephedrine ([employer] ALLERGY RELIEF-D12) 5-120 MG per extended release tablet TAKE 1 TABLET BY MOUTH TWICE A DAY 5/27/22  Yes Shanika Porter MD   traMADol (ULTRAM) 50 MG tablet Take 1-2 tablets by mouth every 6 hours as needed for Pain for up to 180 days. Take lowest dose possible to manage pain 5/27/22 11/23/22 Yes Shanika Porter MD   benzonatate (TESSALON) 200 MG capsule TAKE 1 CAPSULE BY MOUTH 3 TIMES A DAY AS NEEDED FOR COUGH 5/27/22  Yes Shanika Porter MD   SITagliptin (JANUVIA) 100 MG tablet TAKE 1 TABLET BY MOUTH EVERY DAY 5/27/22  Yes Shanika Porter MD   ferrous sulfate (IRON 325) 325 (65 Fe) MG tablet Take 1 tablet by mouth daily (with breakfast) 5/12/22  Yes Annalisa Armendariz MD   hydrOXYzine (ATARAX) 10 MG tablet Take 1 tablet by mouth every 8 hours as needed for Itching TAKE 1 TO 3 TABLETS BY MOUTH 3 TIMES DAILY AS NEEDED FOR ITCHING 5/8/22  Yes Greeley King, APRN - CNP   furosemide (LASIX) 40 MG tablet Take 1 tablet by mouth daily 5/9/22  Yes Greeley King, APRN - CNP   acetaminophen (TYLENOL) 325 MG tablet  4/21/22  Yes Historical Provider, MD   guaiFENesin (MUCINEX) 600 MG extended release tablet Take 1,200 mg by mouth every morning   Yes Historical Provider, MD   naloxone 4 MG/0.1ML LIQD nasal spray SPRAY ONCE INTO ONE NOSTRIL IF NEEDED. CALL 911. REPEAT DOSE IN OTHER NOSTRIL IF NO RESPONSE IN 3MIN. 4/21/22  Yes Historical Provider, MD   potassium chloride (KLOR-CON) 10 MEQ extended release tablet Take 20 mEq by mouth daily as needed   Yes Historical Provider, MD LORazepam (ATIVAN) 0.5 MG tablet Take 0.5 mg by mouth every 6 hours as needed for Anxiety. Yes Historical Provider, MD   albuterol (PROVENTIL) (2.5 MG/3ML) 0.083% nebulizer solution INHALE THE CONTENTS OF 1 VIAL VIA NEBULIZER EVERY 6 HOURS AS NEEDED FOR WHEEZING 2/7/22  Yes Wan Shaikh MD   Blood Glucose Monitoring Suppl (TRUE METRIX METER) PUJA Test daily and as needed 9/10/21  Yes Wan Shaikh MD   blood glucose test strips (ASCENSIA AUTODISC VI;ONE TOUCH ULTRA TEST VI) strip 1 each by In Vitro route daily As needed. 9/10/21  Yes Wan Shaikh MD   TRUEplus Lancets 33G MISC Use daily 9/10/21  Yes Wan Shaikh MD   Blood Glucose Calibration (TRUE METRIX LEVEL 1) Low SOLN use as needed 9/9/21  Yes Wan Shaikh MD   INSULIN SYRINGE 1CC/29G 29G X 1/2\" 1 ML MISC 1 each by Does not apply route 2 times daily 3/6/20  Yes Wan Shaikh MD   OXYGEN Inhale 3 L into the lungs 7/14/19  Yes Historical Provider, MD       REVIEW OF SYSTEMS  The following systems were reviewed and revealed the following in addition to any already discussed in the HPI:    Review of Systems   Constitutional:  Negative for fatigue and fever. HENT:  Positive for nosebleeds. Negative for congestion, ear pain, postnasal drip, rhinorrhea and sneezing. Eyes:  Negative for pain and visual disturbance. Respiratory:  Positive for shortness of breath. Negative for cough. Cardiovascular:  Negative for chest pain. Gastrointestinal:  Negative for nausea and vomiting. Endocrine: Negative. Genitourinary: Negative. Musculoskeletal:  Negative for neck pain and neck stiffness. Skin:  Negative for rash. Neurological:  Negative for dizziness and headaches.         PHYSICAL EXAM  /80 (Site: Left Upper Arm, Position: Sitting)   Pulse 86   Temp 97.6 °F (36.4 °C) (Infrared)   Ht 5' 2\" (1.575 m)   Wt 132 lb (59.9 kg)   BMI 24.14 kg/m²     GENERAL: No Acute Distress, Alert and Oriented, no hoarseness  EYES: EOMI, Anti-icteric  NOSE: No epistaxis, nasal mucosa within normal limits, no purulent drainage, large septal perforation, packing in left nares  EARS: Normal external canal appearance, EAC patent bilaterally,  FACE: 1/6 House-Brackmann Scale, symmetric, sensation equal bilaterally  ORAL CAVITY: No masses or lesions palpated, uvula is midline, moist mucous membranes,   NECK: Normal range of motion, no thyromegaly, trachea is midline, no lymphadenopathy, no neck masses, no crepitus  CHEST: Normal respiratory effort, no retractions, breathing comfortably  SKIN: No rashes, normal appearing skin, no evidence of skin lesions/tumors    RADIOLOGY  Summary of findings:      PROCEDURE  Control of Epistaxis    Pre Op: left sided Epistaxis  Post Op: Same    After consent was obtained a cottonoid soaked with lidocaine was placed in the left nasal cavity. After 5 minutes cautery was performed of the floor of the septal perforation. Nasal endoscopy was needed to perform the control of bleeding. The bleeding site was posterior. Hemostasis was obtained. The patient tolerated well. No complications. I attest that I was present for and did the entire procedure myself. ASSESSMENT/PLAN  Varsha Barrios is a very pleasant 69 y.o. female with     1. Epistaxis  Epistaxis was controlled with cauterization of the floor of the septal perforation. She will use triple antibiotic ointment or Vaseline to the anterior portion of her nose for the next week. She will avoid blowing her nose. She does have humidification on her oxygen at home. If she has any recurrence of bleeding I encouraged her to use Afrin with pressure as that should help slow the bleeding significantly.   She is scheduled to have surgery at [facility] on her back this Thursday.  - MA NASAL/SINUS SCOPY,W/CONTROL NASAL HEM    2. Nasal septal perforation  Is a longstanding septal perforation that is greater than 1 cm so no skull correction could be completed at this point time. If she has persistent bleeds we will consider adding a septal button. She will follow-up with me as needed. Medical Decision Making:   The following items were considered in medical decision making:  Independent review of images  Review / order clinical lab tests  Review / order radiology tests  Decision to obtain old records

## 2022-08-08 NOTE — PROGRESS NOTES
P [facility]                                 Pulmonary Consult /Progress Note :                                                                  CHIEF COMPLAINT: SOB  Reason ILD with acute exacerbation. Hypoxia acute on chronic. HPI:   Patient is 78-year-old female with extensive work-up for interstitial lung disease that include bronchoscopy with EBUS as well as cryobiopsy and reviewing her records showing most likely organizing pneumonia along with possibility of nonspecific interstitial pneumonia    She used to follow with Dr. Coral Hutson and she was placed on pirfenidone    The patient denies any history of previously smoking    She uses 3 L of home oxygen and she noticed that her oxygen requirement has been increasing over the last few days along with shortness of breath and dyspnea on exertion    During the admission ,She was a placed on steroids in the past however she has not been taking any steroids for more than a year    Denies any sputum, most of her cough dry, denies any leg swelling, denies any history of travel or COVID exposure and more short winded    Denies any fever chills chest pain no hemoptysis    She is to have surgery    Next weak       Past Medical History:   Diagnosis Date    A-fib ([address]     Anxiety     Cryptogenic organizing pneumonia ([address]     Depression     GERD (gastroesophageal reflux disease)     Hyperlipidemia     On home oxygen therapy     uses O2 3L prn    Rash     Thyroid disease     hypothyroidism       Past Surgical History:        Procedure Laterality Date    ARM SURGERY Left 3/2/2020    LEFT ULNAR NERVE DECOMPRESSION AT THE ELBOW performed by Mackenzie Spears MD at [facility] N/A 6/27/2019    EBUS WF W/ANES.  performed by Forest Medina MD at [facility]  6/27/2019    BRONCHOSCOPY/TRANSBRONCHIAL NEEDLE BIOPSY performed by Forest Medina MD at [address] [place] [address] BRONCHOSCOPY  6/27/2019    BRONCHOSCOPY/TRANSBRONCHIAL LUNG BIOPSY performed by Marquis Radha MD at [place] [address]  6/27/2019    BRONCHOSCOPY ALVEOLAR LAVAGE performed by Marquis Radha MD at [place] [address]  07/10/2019    BRONCHOSCOPY N/A 7/10/2019    BRONCHOSCOPY ALVEOLAR LAVAGE performed by Jessica Alvarado MD at [place] [address] Bilateral 08/06/2019    BRONCHOSCOPY N/A 8/6/2019    BRONCHOSCOPY ALVEOLAR LAVAGE performed by Jose M Estrella MD at [place] [address] N/A 12/24/2019    BRONCHOSCOPY ALVEOLAR LAVAGE [facility], TOTAL ABDOMINAL (CERVIX REMOVED)      partial       Allergies:  is allergic to penicillins, cefuroxime, doxycycline, imitrex [sumatriptan], meperidine, other, sulfa antibiotics, keflex [cephalexin], and tape [adhesive tape]. Social History:    TOBACCO:   reports that she has never smoked. She has never used smokeless tobacco.  ETOH:   reports no history of alcohol use.       Family History:       Problem Relation Age of Onset    Diabetes Mother     High Blood Pressure Mother     Asthma Sister     Other Father        Current Medications:    Current Outpatient Medications:     albuterol sulfate HFA (PROVENTIL;VENTOLIN;PROAIR) 108 (90 Base) MCG/ACT inhaler, INHALE 2 PUFFS INTO THE LUNGS EVERY 6 HOURS AS NEEDED FOR WHEEZING, Disp: 3 each, Rfl: 1    Pirfenidone 267 MG TABS, Take 3 tablets by mouth in the morning, at noon, and at bedtime, Disp: , Rfl:     atorvastatin (LIPITOR) 40 MG tablet, Take 1 tablet by mouth daily, Disp: 90 tablet, Rfl: 1    levothyroxine (SYNTHROID) 125 MCG tablet, Take 1 tablet by mouth Daily TAKE 1 TABLET BY MOUTH EVERY DAY, Disp: 90 tablet, Rfl: 1    sertraline (ZOLOFT) 100 MG tablet, Take 2 tablets by mouth daily, Disp: 180 tablet, Rfl: 1    mirtazapine (REMERON) 30 MG tablet, Take 1 tablet by mouth nightly, Disp: 90 tablet, Rfl: 1    nadolol (CORGARD) 20 MG tablet, Take 1 tablet by mouth daily, Disp: 90 tablet, Rfl: 1    pantoprazole (PROTONIX) 40 MG tablet, Take 1 tablet by mouth every morning (before breakfast), Disp: 90 tablet, Rfl: 1    empagliflozin (JARDIANCE) 25 MG tablet, Take 1 tablet by mouth daily, Disp: 90 tablet, Rfl: 1    loratadine-pseudoephedrine ([employer] ALLERGY RELIEF-D12) 5-120 MG per extended release tablet, TAKE 1 TABLET BY MOUTH TWICE A DAY, Disp: 60 tablet, Rfl: 5    traMADol (ULTRAM) 50 MG tablet, Take 1-2 tablets by mouth every 6 hours as needed for Pain for up to 180 days. Take lowest dose possible to manage pain, Disp: 240 tablet, Rfl: 5    benzonatate (TESSALON) 200 MG capsule, TAKE 1 CAPSULE BY MOUTH 3 TIMES A DAY AS NEEDED FOR COUGH, Disp: 90 capsule, Rfl: 5    SITagliptin (JANUVIA) 100 MG tablet, TAKE 1 TABLET BY MOUTH EVERY DAY, Disp: 90 tablet, Rfl: 1    ferrous sulfate (IRON 325) 325 (65 Fe) MG tablet, Take 1 tablet by mouth daily (with breakfast), Disp: 30 tablet, Rfl: 3    hydrOXYzine (ATARAX) 10 MG tablet, Take 1 tablet by mouth every 8 hours as needed for Itching TAKE 1 TO 3 TABLETS BY MOUTH 3 TIMES DAILY AS NEEDED FOR ITCHING, Disp: 90 tablet, Rfl: 1    furosemide (LASIX) 40 MG tablet, Take 1 tablet by mouth daily, Disp: 30 tablet, Rfl: 1    guaiFENesin (MUCINEX) 600 MG extended release tablet, Take 1,200 mg by mouth every morning, Disp: , Rfl:     naloxone 4 MG/0.1ML LIQD nasal spray, SPRAY ONCE INTO ONE NOSTRIL IF NEEDED. CALL 911. REPEAT DOSE IN OTHER NOSTRIL IF NO RESPONSE IN 3MIN., Disp: , Rfl:     potassium chloride (KLOR-CON) 10 MEQ extended release tablet, Take 20 mEq by mouth daily as needed, Disp: , Rfl:     LORazepam (ATIVAN) 0.5 MG tablet, Take 0.5 mg by mouth every 6 hours as needed for Anxiety. , Disp: , Rfl:     albuterol (PROVENTIL) (2.5 MG/3ML) 0.083% nebulizer solution, INHALE THE CONTENTS OF 1 VIAL VIA NEBULIZER EVERY 6 HOURS AS NEEDED FOR WHEEZING, Disp: 720 mL, Rfl: 1    Blood Glucose Monitoring Suppl (TRUE METRIX METER) PUJA, Test daily and as needed, Disp: 1 each, Rfl: 0    blood glucose test strips (ASCENSIA AUTODISC VI;ONE TOUCH ULTRA TEST VI) strip, 1 each by In Vitro route daily As needed. , Disp: 100 each, Rfl: 3    TRUEplus Lancets 33G MISC, Use daily, Disp: 100 each, Rfl: 3    Blood Glucose Calibration (TRUE METRIX LEVEL 1) Low SOLN, use as needed, Disp: 1 each, Rfl: 2    OXYGEN, Inhale 3 L into the lungs, Disp: , Rfl:     predniSONE (DELTASONE) 10 MG tablet, 40mg x 5 days, 30 mg x 5 days, 20 mg x 5 days, 10 mg x 5 days then stop (Patient not taking: Reported on 8/8/2022), Disp: 40 tablet, Rfl: 0    acetaminophen (TYLENOL) 325 MG tablet, TAKE 3 TABLETS (975 MG TOTAL) BY MOUTH 3 TIMES A DAY AS NEEDED FOR PAIN. (Patient not taking: Reported on 8/8/2022), Disp: , Rfl:     INSULIN SYRINGE 1CC/29G 29G X 1/2\" 1 ML MISC, 1 each by Does not apply route 2 times daily, Disp: 100 each, Rfl: 5      REVIEW OF SYSTEMS:  Constitutional: Negative for fever  HENT: Negative for sore throat  Eyes: Negative for redness   Respiratory: + for dyspnea, cough  Cardiovascular: Negative for chest pain  Gastrointestinal: Negative for vomiting, diarrhea   Genitourinary: Negative for hematuria   Musculoskeletal: Negative for arthralgias   Skin: Negative for rash  Neurological: Negative for syncope  Hematological: Negative for adenopathy  Psychiatric/Behavorial: Negative for anxiety      Objective:   PHYSICAL EXAM:    Blood pressure 134/80, pulse 86, resp. rate 18, height 5' 3\" (1.6 m), SpO2 97 %, not currently breastfeeding.' on RA  Gen: No distress. Eyes: PERRL. No sclera icterus. No conjunctival injection. ENT: No discharge. Pharynx clear. Neck: Trachea midline. No obvious mass. Resp: Rhonchi bilaterally no clear Rales   CV: Regular rate. Regular rhythm. No murmur or rub. No edema. GI: Non-tender. Non-distended. No hernia. Skin: Warm and dry. No nodule on exposed extremities. Lymph: No cervical LAD.  No supraclavicular LAD. M/S: No cyanosis. No joint deformity. No clubbing. Neuro: Awake. Alert. Moves all four extremities. Psych: Oriented x 3. No anxiety. DATA reviewed by me:   CXR  CT       LABS/IMAGING:    CBC:  Lab Results   Component Value Date    WBC 13.3 (H) 07/17/2022    HGB 11.3 (L) 07/17/2022    HCT 36.2 07/17/2022    MCV 80.7 07/17/2022     07/17/2022    LYMPHOPCT 5.9 07/17/2022    RBC 4.49 07/17/2022    MCH 25.3 (L) 07/17/2022    MCHC 31.3 07/17/2022    RDW 16.4 (H) 07/17/2022    NEUTOPHILPCT 91.3 07/17/2022    MONOPCT 2.7 07/17/2022    BASOPCT 0.1 07/17/2022    NEUTROABS 12.2 (H) 07/17/2022    LYMPHSABS 0.8 (L) 07/17/2022    MONOSABS 0.4 07/17/2022    EOSABS 0.0 07/17/2022    BASOSABS 0.0 07/17/2022       Recent Labs     07/17/22  0429 07/16/22  0458 07/15/22  0732   WBC 13.3* 11.9* 5.2   HGB 11.3* 10.6* 10.8*   HCT 36.2 34.1* 33.5*   MCV 80.7 80.5 80.2    223 182       BMP:   No results for input(s): NA, K, CL, CO2, PHOS, BUN, CREATININE, CA in the last 72 hours. MG:   Lab Results   Component Value Date/Time    MG 2.40 07/15/2022 09:47 AM     Ca/Phos:   Lab Results   Component Value Date    CALCIUM 9.1 07/17/2022    PHOS 2.6 07/17/2022     LIVER PROFILE:   No results for input(s): AST, ALT, LIPASE, BILIDIR, BILITOT, ALKPHOS in the last 72 hours. Invalid input(s): AMYLASE,  ALB      PT/INR: No results for input(s): PROTIME, INR in the last 72 hours. APTT: No results for input(s): APTT in the last 72 hours.     Cardiac Enzymes:  Lab Results   Component Value Date    CKTOTAL 23 (L) 07/10/2019    TROPONINI <0.01 07/14/2022       Assessment:       -Based on the biopsies, seems h/o of cryptogenic organizing pneumonia and possibly coexisting NSIP or chronic HP.    -Acute exacerbation of ILD   -History of COPD  -Chronic respiratory failure-      Plan:      *- she is off Prednisone   *- On pirfenidone   *-CT last admission show actually some improvement   *_ work up did not show infections   *- on albuterol as needed  *-Cover with antibiotic  *-Bronchodilator and ICS  *-Continue her pirfenidone and we will asked  to help make sure that she get her medication at home  PFT   Discuss about the back surgery ,she understand it is very high risk from pulmonary stand point ,however she agreed to proceed understanding risk of RF ,etc   I recommended she get observed at 24 hour post surgery until she is to base line  If possible to go with spinal anaesthesia  To get pulmonary involved       Thank you very much for allowing me to participate in the care of this pleasant patient , should you have any questions ,please do not hesitate to contact me      Herlinda Davis MD,[facility]  Pulmonary&Critical Care Medicine    Callie Cartagena    NOTE: This report was transcribed using voice recognition software. Every effort was made to ensure accuracy; however, inadvertent computerized transcription errors may be present.

## 2022-08-09 ENCOUNTER — CARE COORDINATION (OUTPATIENT)
Dept: CASE MANAGEMENT | Age: 67
End: 2022-08-09

## 2022-08-09 NOTE — CARE COORDINATION
[facility] Transitions Follow Up Call    2022    Patient: Nhung Garcia  Patient : 1955   MRN: [id number]  Reason for Admission: acute on chronic hypoxic respiratory failure, pulmonary fibrosis, hx of cryptogenic organizing pneumonia with hypersensitivity pneumonitis, hypokalemia, hypomagnesemia, chronic dCHF, DM2, HTN, HLD, hypothyroidism, iron deficiency anemia, depression, anxiety, chronic back pain (f/w UC spine surgery) -> home with [facility]  Discharge Date: 22 RARS: Readmission Risk Score: 27.5    Unable to reach for scheduled final outreach. Message left on voice mail including CTN contact info for future needs. Episode resolved at this time.         Follow Up  Future Appointments   Date Time Provider Calvin Mcmullen   2022 10:00 AM [facility] PULMONARY FUNCTION TEST[facility] [place] [facility]   2022 11:00 AM MD DURAN Willoughby PULTJ Alberto RN

## 2022-08-15 NOTE — ED NOTES
Reviewed patient discharge instructions at this time, copy given to patient. No questions or concerns. Patient voiced understanding.         Dejah Pyle RN  11/20/20 [id number]
none

## 2022-08-16 ENCOUNTER — HOSPITAL ENCOUNTER (INPATIENT)
Age: 67
LOS: 17 days | Discharge: HOME HEALTH | DRG: 559 | End: 2022-09-02
Attending: STUDENT IN AN ORGANIZED HEALTH CARE EDUCATION/TRAINING PROGRAM | Admitting: STUDENT IN AN ORGANIZED HEALTH CARE EDUCATION/TRAINING PROGRAM
Payer: MEDICARE

## 2022-08-16 DIAGNOSIS — Z98.890 H/O SPINAL SURGERY: Primary | ICD-10-CM

## 2022-08-16 DIAGNOSIS — K62.5 RECTAL BLEED: ICD-10-CM

## 2022-08-16 LAB
GLUCOSE BLD-MCNC: 122 MG/DL (ref 70–99)
PERFORMED ON: ABNORMAL

## 2022-08-16 PROCEDURE — 6370000000 HC RX 637 (ALT 250 FOR IP): Performed by: STUDENT IN AN ORGANIZED HEALTH CARE EDUCATION/TRAINING PROGRAM

## 2022-08-16 PROCEDURE — 94640 AIRWAY INHALATION TREATMENT: CPT

## 2022-08-16 PROCEDURE — 1280000000 HC REHAB R&B

## 2022-08-16 PROCEDURE — 6360000002 HC RX W HCPCS: Performed by: STUDENT IN AN ORGANIZED HEALTH CARE EDUCATION/TRAINING PROGRAM

## 2022-08-16 PROCEDURE — 94761 N-INVAS EAR/PLS OXIMETRY MLT: CPT

## 2022-08-16 PROCEDURE — 2700000000 HC OXYGEN THERAPY PER DAY

## 2022-08-16 RX ORDER — NALOXONE HYDROCHLORIDE 0.4 MG/ML
0.4 INJECTION, SOLUTION INTRAMUSCULAR; INTRAVENOUS; SUBCUTANEOUS PRN
Status: DISCONTINUED | OUTPATIENT
Start: 2022-08-16 | End: 2022-09-02 | Stop reason: HOSPADM

## 2022-08-16 RX ORDER — ONDANSETRON 4 MG/1
4 TABLET, ORALLY DISINTEGRATING ORAL EVERY 8 HOURS PRN
Status: DISCONTINUED | OUTPATIENT
Start: 2022-08-16 | End: 2022-08-18

## 2022-08-16 RX ORDER — HYDROXYZINE HYDROCHLORIDE 10 MG/1
10 TABLET, FILM COATED ORAL 3 TIMES DAILY PRN
Status: DISCONTINUED | OUTPATIENT
Start: 2022-08-16 | End: 2022-09-02 | Stop reason: HOSPADM

## 2022-08-16 RX ORDER — ALBUTEROL SULFATE 90 UG/1
2 AEROSOL, METERED RESPIRATORY (INHALATION) EVERY 6 HOURS PRN
Status: DISCONTINUED | OUTPATIENT
Start: 2022-08-16 | End: 2022-09-02 | Stop reason: HOSPADM

## 2022-08-16 RX ORDER — FUROSEMIDE 40 MG/1
40 TABLET ORAL DAILY PRN
Status: DISCONTINUED | OUTPATIENT
Start: 2022-08-17 | End: 2022-09-02 | Stop reason: HOSPADM

## 2022-08-16 RX ORDER — NADOLOL 20 MG/1
20 TABLET ORAL DAILY
Status: DISCONTINUED | OUTPATIENT
Start: 2022-08-17 | End: 2022-09-02 | Stop reason: HOSPADM

## 2022-08-16 RX ORDER — CETIRIZINE HYDROCHLORIDE 10 MG/1
10 TABLET ORAL DAILY
Status: DISCONTINUED | OUTPATIENT
Start: 2022-08-17 | End: 2022-09-02 | Stop reason: HOSPADM

## 2022-08-16 RX ORDER — ATORVASTATIN CALCIUM 40 MG/1
40 TABLET, FILM COATED ORAL DAILY
Status: DISCONTINUED | OUTPATIENT
Start: 2022-08-17 | End: 2022-09-02 | Stop reason: HOSPADM

## 2022-08-16 RX ORDER — MIRTAZAPINE 15 MG/1
30 TABLET, FILM COATED ORAL NIGHTLY
Status: DISCONTINUED | OUTPATIENT
Start: 2022-08-16 | End: 2022-09-02 | Stop reason: HOSPADM

## 2022-08-16 RX ORDER — IPRATROPIUM BROMIDE AND ALBUTEROL SULFATE 2.5; .5 MG/3ML; MG/3ML
1 SOLUTION RESPIRATORY (INHALATION) 3 TIMES DAILY
Status: DISCONTINUED | OUTPATIENT
Start: 2022-08-16 | End: 2022-08-17

## 2022-08-16 RX ORDER — BENZONATATE 100 MG/1
100 CAPSULE ORAL 3 TIMES DAILY PRN
Status: DISCONTINUED | OUTPATIENT
Start: 2022-08-16 | End: 2022-09-02 | Stop reason: HOSPADM

## 2022-08-16 RX ORDER — METHOCARBAMOL 750 MG/1
750 TABLET, FILM COATED ORAL 3 TIMES DAILY
Status: DISCONTINUED | OUTPATIENT
Start: 2022-08-16 | End: 2022-09-02 | Stop reason: HOSPADM

## 2022-08-16 RX ORDER — ACETAMINOPHEN 500 MG
1000 TABLET ORAL EVERY 8 HOURS PRN
Status: DISCONTINUED | OUTPATIENT
Start: 2022-08-16 | End: 2022-09-02 | Stop reason: HOSPADM

## 2022-08-16 RX ORDER — ERGOCALCIFEROL 1.25 MG/1
50000 CAPSULE ORAL WEEKLY
Status: DISCONTINUED | OUTPATIENT
Start: 2022-08-21 | End: 2022-09-02 | Stop reason: HOSPADM

## 2022-08-16 RX ORDER — PIRFENIDONE 801 MG/1
801 TABLET, FILM COATED ORAL
Status: DISCONTINUED | OUTPATIENT
Start: 2022-08-17 | End: 2022-09-02 | Stop reason: HOSPADM

## 2022-08-16 RX ORDER — POTASSIUM CHLORIDE 20 MEQ/1
20 TABLET, EXTENDED RELEASE ORAL DAILY
Status: DISCONTINUED | OUTPATIENT
Start: 2022-08-16 | End: 2022-08-19

## 2022-08-16 RX ORDER — HEPARIN SODIUM 5000 [USP'U]/ML
5000 INJECTION, SOLUTION INTRAVENOUS; SUBCUTANEOUS EVERY 8 HOURS SCHEDULED
Status: DISCONTINUED | OUTPATIENT
Start: 2022-08-16 | End: 2022-09-02 | Stop reason: HOSPADM

## 2022-08-16 RX ORDER — BISACODYL 10 MG
10 SUPPOSITORY, RECTAL RECTAL DAILY PRN
Status: DISCONTINUED | OUTPATIENT
Start: 2022-08-16 | End: 2022-09-02 | Stop reason: HOSPADM

## 2022-08-16 RX ORDER — SENNA PLUS 8.6 MG/1
1 TABLET ORAL 2 TIMES DAILY
Status: DISCONTINUED | OUTPATIENT
Start: 2022-08-16 | End: 2022-08-24

## 2022-08-16 RX ORDER — OXYCODONE HYDROCHLORIDE AND ACETAMINOPHEN 5; 325 MG/1; MG/1
1 TABLET ORAL EVERY 4 HOURS PRN
Status: DISCONTINUED | OUTPATIENT
Start: 2022-08-16 | End: 2022-08-18

## 2022-08-16 RX ORDER — LEVOTHYROXINE SODIUM 0.12 MG/1
125 TABLET ORAL DAILY
Status: DISCONTINUED | OUTPATIENT
Start: 2022-08-17 | End: 2022-09-02 | Stop reason: HOSPADM

## 2022-08-16 RX ORDER — PANTOPRAZOLE SODIUM 40 MG/1
40 TABLET, DELAYED RELEASE ORAL
Status: DISCONTINUED | OUTPATIENT
Start: 2022-08-17 | End: 2022-09-02 | Stop reason: HOSPADM

## 2022-08-16 RX ADMIN — OXYCODONE AND ACETAMINOPHEN 1 TABLET: 5; 325 TABLET ORAL at 23:05

## 2022-08-16 RX ADMIN — HEPARIN SODIUM 5000 UNITS: 5000 INJECTION INTRAVENOUS; SUBCUTANEOUS at 20:58

## 2022-08-16 RX ADMIN — MIRTAZAPINE 30 MG: 15 TABLET, FILM COATED ORAL at 20:58

## 2022-08-16 RX ADMIN — IPRATROPIUM BROMIDE AND ALBUTEROL SULFATE 1 AMPULE: .5; 2.5 SOLUTION RESPIRATORY (INHALATION) at 20:27

## 2022-08-16 RX ADMIN — METHOCARBAMOL TABLETS 750 MG: 750 TABLET, COATED ORAL at 20:58

## 2022-08-16 RX ADMIN — ONDANSETRON 4 MG: 4 TABLET, ORALLY DISINTEGRATING ORAL at 20:58

## 2022-08-16 ASSESSMENT — PAIN DESCRIPTION - ORIENTATION: ORIENTATION: LOWER

## 2022-08-16 ASSESSMENT — PAIN DESCRIPTION - LOCATION: LOCATION: BACK

## 2022-08-16 ASSESSMENT — PAIN SCALES - GENERAL
PAINLEVEL_OUTOF10: 7
PAINLEVEL_OUTOF10: 5
PAINLEVEL_OUTOF10: 5

## 2022-08-16 ASSESSMENT — PAIN DESCRIPTION - DESCRIPTORS: DESCRIPTORS: PRESSURE

## 2022-08-16 NOTE — PLAN OF CARE
ARU PATIENT TREATMENT PLAN  [facility]   [phone]    Elba Hunter    : 1955  [id number]  MRN: [id number]   PHYSICIAN:  Tirso Maloney MD  Primary Problem    Patient Active Problem List   Diagnosis    Chest pain    HTN (hypertension)    Hyperlipidemia with target LDL less than 130    Hypokalemia    Insomnia    Trochanteric bursitis of both hips    Migraine    Syncope    Acute blood loss anemia    Fatigue    Hypothyroidism    Mild single current episode of major depressive disorder (HCC)    Anxiety    Pneumonia of both lower lobes due to infectious organism    A-fib ([address]    Atypical pneumonia    Acute bronchitis with bronchospasm    Acute on chronic diastolic CHF (congestive heart failure) ([facility])    Class 2 obesity with body mass index (BMI) of 39.0 to 39.9 in adult    Abnormal chest CT    Acute diastolic heart failure (HCC)    ILD (interstitial lung disease) ([facility])    Acute on chronic respiratory failure with hypoxia ([facility])    Restrictive lung disease    Abnormal CT of the chest    SOB (shortness of breath)    Acute cystitis without hematuria    Shortness of breath    Chronic respiratory failure with hypoxia ([facility])    Cryptogenic organizing pneumonia ([address]    Pneumothorax of left lung after biopsy    COPD (chronic obstructive pulmonary disease) (HCC)    Type 2 diabetes mellitus without complication (HCC)    Hyponatremia    Numbness and tingling in left hand    Ulnar neuropathy at elbow of left upper extremity    Acute congestive heart failure (HCC)    Left wrist pain    Left wrist tendinitis    GERD (gastroesophageal reflux disease)    Toxic metabolic encephalopathy    VIRGINIE (acute kidney injury) ([address]    Lumbar radiculopathy    Acute on chronic respiratory failure (HCC)    Rib pain on left side    Elevated brain natriuretic peptide (BNP) level    Fracture of multiple ribs of left side    Interstitial lung disease ([address] Hypomagnesemia    Depression    Chronic diastolic congestive heart failure (HCC)    Burst fracture of lumbar vertebra (HCC)    H/O Spinal surgery    Severe malnutrition [facility])       Rehabilitation Diagnosis:     H/O Spinal surgery [Z98.890]       ADMIT DATE:8/16/2022    Patient Goals: \"To go home and be out of pain\"    Admitting Impairments: Pt. Admitted s/p L1 burst fracture w/ T11-L3 PDFF and CSF leak resulting in Decreased functional mobility ; Decreased safe awareness;Decreased balance;Decreased cognition;Decreased ADL status; Decreased ROM; Decreased endurance;Decreased sensation;Decreased strength;Decreased posture;Decreased coordination;Decreased fine motor control  Barriers: pain, endurance, comorbidities  Participation: Good     CARE PLAN     NURSING:  Ailyn Francois while on this unit will:     [x] Be continent of bowel and bladder     [x] Have an adequate number of bowel movements  [] Urinate with no urinary retention >300ml in bladder  [] Complete bladder protocol with yung removal  [x] Maintain O2 SATs at 90%  [x] Have pain managed while on ARU       [] Be pain free by discharge   [x] Have no skin breakdown while on ARU  [] Have improved skin integrity via wound measurements  [x] Have no signs/symptoms of infection at the wound site  [x] Be free from injury during hospitalization   [] Complete education with patient/family with understanding demonstrated for:  [] Adjustment   [x] Other:   Nursing interventions may include bowel/bladder training, education for medical assistive devices, medication education, O2 saturation management, energy conservation, stress management techniques, fall prevention, alarms protocol, seating and positioning, skin/wound care, pressure relief instruction,dressing changes,  infection protection, DVT prophylaxis, and/or assistance with in room safety with transfers to bed, toilet, wheelchair, shower as well as bathroom activities and hygiene.      Patient/caregiver education for:   [x] Disease/sustained injury/management      [x] Medication Use   [x] Surgical intervention   [x] Safety   [x] Body mechanics and or joint protection   [x] Health maintenance         PHYSICAL THERAPY:  Goals:                  Short Term Goals  Time Frame for Short term goals: 5 days- 8/20/22  Short term goal 1: Pt will complete bed mobility with SBA, with min cues for log roll technique  Short term goal 2: Pt will complete functional transfers with CGA consistently, using LRAD  Short term goal 3: Pt will ambulate 25ft with min A using RW            Long Term Goals  Time Frame for Long term goals : 10 days- 8/26/22  Long term goal 1: Pt will complete bed mobility with mod I without cues for log roll technique  Long term goal 2: Pt will complete functional transfers with mod I using LRAD  Long term goal 3: Pt will ambulate 50ft with supervision using LRAD  Long term goal 4: Pt will complete car transfer with supervision using LRAD  Long term goal 5: Pt will verbalize 3/3 spinal precautions without cues and adhere to precautions with mobility without cues  These goals were reviewed with this patient at the time of assessment and Sherrell Jordan is in agreement.      Plan of Care: Pt to be seen 5 out of 7 days per week, 60   mins (exact) per day for 10 days (exact)                   Current Treatment Recommendations: Strengthening, Balance training, Functional mobility training, Transfer training, Endurance training, Wheelchair mobility training, Gait training, Stair training, Equipment evaluation, education, & procurement, Neuromuscular re-education, Home exercise program, Safety education & training, Patient/Caregiver education & training      OCCUPATIONAL THERAPY:  Goals:             Short Term Goals  Time Frame for Short term goals: 5 days (8/21/22)  Short Term Goal 1: Pt will complete standing task x3 minutes SBA with LRAD  Short Term Goal 2: Pt will complete toileting/transfer CGA with LRAD and DME PRN  Short (08/23/2022)  Goal 1: Pt will complete graded recall tasks using compensatory strategies with 90% acc given min cues  Goal 2: Pt will complete executive function tasks (e.g. money, time, etc) with 90% acc given min cues  Goal 3: Pt will complete higher level critical thinking tasks with 90% acc given min cues  Goal 4: Pt will complete respiratory retraining exercises to improve overall breath support with speech 10/10 given min cues              Timeframe for Short-term Goals: 7 days (08/23/2022)  Long-term Goals  Timeframe for Long-term Goals: 10 days (08/26/2022)  Goal 1: Pt will improve overall cognitive-linguistic abilities to promote safe and independent return home PLOF    These goals were reviewed with this patient at the time of assessment and Teresa Nettles is in agreement    Plan of Care: Pt to be seen 5 out of 7 days per week, 30 mins (exact) per day for 10 days (exact)             Dysphagia:  diet tolerance monitoring, edu re: safe swallow strategies            Speech/Language/Cognition: Compensatory strategy training and carryover, recall/STM, problem solving, reasoning, exec function, thought organization, attention. CASE MANAGEMENT:  Goals:   Assist patient/family with discharge planning, patient/family counseling,   and coordination with insurance during ARU stay.     QIM / IRF ADE SCORES:  ITEM CURRENT SCORE GOAL   Eating CARE Score: 4 Discharge Goal: Independent   Oral Hygiene CARE Score: 3 Discharge Goal: Independent   Toileting Hygiene CARE Score: 2 Discharge Goal: Independent   Shower/Bathe Self CARE Score: 2 Discharge Goal: Set-up or clean-up assistance   Upper Body Dressing CARE Score: 3 Discharge Goal: Independent   Lower Body Dressing CARE Score: 1 Discharge Goal: Independent   On/Off Footwear CARE Score: 1 Discharge Goal: Independent   Roll Left & Right CARE Score: 3 Discharge Goal: Independent   Sit to Lying  CARE Score: 4 Discharge Goal: Independent   Lying to Sitting EOB CARE Score: 3 Discharge Goal: Independent   Sit to Stand CARE Score: 3 Discharge Goal: Independent   Chair/Bed to Chair Transfer CARE Score: 3 Discharge Goal: Independent   Toilet Transfer CARE Score: 3 Discharge Goal: Independent   Car Transfer CARE Score: 88 Discharge Goal: Supervision or touching assistance   Walk 10 Feet CARE Score: 88 Discharge Goal: Supervision or touching assistance   Walk 50 Feet, 2 Turns CARE Score: 88 Discharge Goal: Supervision or touching assistance   Walk 150 Feet CARE Score: 9 Discharge Goal: Not Applicable   Walk 10 Feet, Uneven Surface CARE Score: 88 Discharge Goal: Supervision or touching assistance   1 Step (Curb) CARE Score: 9 Discharge Goal: Not Applicable   4 Steps CARE Score: 9 Discharge Goal: Not Applicable   12 Steps CARE Score: 9 Discharge Goal: Not Applicable    Object CARE Score: 88 Discharge Goal: Supervision or touching assistance   Wheel 50 feet, 2 turns CARE Score: 88 Discharge Goal: Independent   Wheel 150 Feet CARE Score: 88 Discharge Goal: Independent          Corie Salguero will be seen a minimum of 3 hours of therapy per day, a minimum of 5 out of 7 days per week. [] In this rare instance due to the nature of this patient's medical involvement, this patient will be seen 15 hours per week (900 minutes within a 7 day period). Treatments may include therapeutic exercises, gait training, neuromuscular re-ed, transfer training, community reintegration, bed mobility, w/c mobility and training, self care, home mgmt, cognitive training, energy conservation,dysphagia tx, speech/language/communication therapy, group therapy, and patient/family education. In addition, dietician/nutritionist may monitor calorie count as well as intake and collaboratively work with SLP on dietary upgrades. Neuropsychology/Psychology may evaluate and provide necessary support.     Medical issues being managed closely and that require 24 hour availability of a physician:   [] Swallowing Precautions  [x] Bowel/Bladder Fx  [] Weight bearing precautions   [x] Wound Care    [x] Pain Mgmt   [x] Infection Protection   [x] DVT Prophylaxis   [x] Fall Precautions  [x] Fluid/Electrolyte/Nutrition Balance   [] Voice Protection   [x] Respiratory  [] Other:    Medical Prognosis: [x] Good  [] Fair    [] Guarded   Total expected IRF days 10  Anticipated discharge destination:    [] Home Independently   [x] Home Modified Independent  [] Home with supervision    []SNF     [] Other                                           Physician anticipated functional outcomes:  Home w/ assist as needed and home health services. IPOC brief synthesis: Kyle Martinez is a 69year old female with a past medical history significant for chronic respiratory failure on 3 L home O2, ILD, COPD, paroxysmal atrial fibrillation, DM2, HTN, HLD, and thyroid disease who presented to  on 8/11/22 for planned T11-L3 PDFF with L1 corpectomy for L1 burst fracture. She was admitted to [facility] on 8/16/22 due to functional deficits below her baseline. This plan has been reviewed with Layla Chandler on 8/17 in a language the patient understands. Layla Chandler has had the opportunity to include input with the therapy team.      I have reviewed this initial plan of care and agree with its contents:    Title   Name    Date    Time    Physician: Yasmani Torres.  Beau Moreira MD    Case Mgmt: Richmond Garcia RN    OT: Earnest Du, OTR/L    PT: Kwadwo Ospina PT, DPT    RN: Karthik Hopkins RN    ST: Aury Vazquez MA CCC-SLP    : Bridgett Dallas, OTR/L    Other:

## 2022-08-16 NOTE — PROGRESS NOTES
NURSING ASSESSMENT: [facility]     Rehab Dx/Hx: H/O Spinal surgery [S23.757]   :1955  HZQ:[id number]  Date of Admit: 2022  Room #: 0162/0162-01    Subjective:   Patient admitted to [place] [address] from  via stretcher. Alert and oriented x4. Oriented to room and call light system. Oriented to rehab routine and therapy schedules. Informed about care conferences and ordering of meals with PCA. Drug / Medication Review:   Medications were reviewed by RN at time of admission  [x]  No potential or actual clinically significant medication issues were noted. []  Potential or actual clinically significant medication issues were found and MD was notified. (Specified below if applicable)   []  Allergy to medication   []  Drug interactions (drug/drug, drug/food, drug/disease interactions)   []  Duplicate drug   []  Omission (drug missing from prescribed regimen)   []  Non adherence   []  Adverse reaction   []  Wrong patient, drug, dose, route, time error   []  Ineffective drug therapy    Patient Mood Interview    Symptom Presence  0. No (enter 0 in column 2)  1. Yes (enter 0-3 in column 2)  9. No response (leave column 2 blank  Symptom Frequency  0. Never or 1 day  1. 2-6 days (several days)  2. 7-11 days (half or more days)  3. 12-14 days (nearly everyday) 1. Symptom Presence 2. Symptom Frequency    Enter scores in boxes   Little interest or pleasure in doing things   0 0   Feeling down, depressed, or hopeless   0 0      Trouble falling or staying asleep, or sleeping too much       Feeling tired or having little energy       Poor appetite or overeating       Feeling bad about yourself - or that you are a failure or have let yourself or your family down       Trouble concentrating on things, such as reading the newspaper or watching television       Moving or speaking so slowly that other people could have noticed.   Or the opposite- being so fidgety or restless that you have been moving around a lot more than usual.       Thoughts that you would be better off dead, or of hurting yourself in some way. Total Severity: Add scores for all frequency responses in column 2       Social isolation (from [employer])  How often do you feel lonely or isolated from those around you? [x] 0. Never  [] 1. Rarely  [] 2. Sometimes  [] 3. Often  [] 4. Always  [] 8. Patient unable to respond. 4 Eyes Skin Assessment   The patient is being assessed for: Admission     I agree that 2 RN's have performed a thorough Head to Toe Skin Assessment on the patient. ALL assessment sites listed below have been assessed. Areas assessed by both nurses:   [x]   Head, Face, and Ears   [x]   Shoulders, Back, and Chest, Abdomen  [x]   Arms, Elbows, and Hands   [x]   Coccyx, Sacrum, and Ischium  [x]   Legs, Feet, and Heel    Coccyx pink/ blanchable, Right inner buttock pink and intact with flaking     Does the Patient have Skin Breakdown? Yes          Amos Prevention initiated:  Yes   Wound Care Orders initiated:  Yes      [address]  nurse consulted for Pressure Injury (Stage 3,4, Unstageable, DTI, NWPT, Complex wounds)and New or Established Ostomies:  Not Applicable    Primary Nurse eSignature: Angeline García RN  Co-signer eSignature: MIGUE Londono

## 2022-08-17 PROBLEM — E43 SEVERE MALNUTRITION (HCC): Chronic | Status: ACTIVE | Noted: 2022-08-17

## 2022-08-17 LAB
ANION GAP SERPL CALCULATED.3IONS-SCNC: 6 MMOL/L (ref 3–16)
BASOPHILS ABSOLUTE: 0 K/UL (ref 0–0.2)
BASOPHILS RELATIVE PERCENT: 0.4 %
BUN BLDV-MCNC: 8 MG/DL (ref 7–20)
CALCIUM SERPL-MCNC: 7.7 MG/DL (ref 8.3–10.6)
CHLORIDE BLD-SCNC: 103 MMOL/L (ref 99–110)
CO2: 29 MMOL/L (ref 21–32)
CREAT SERPL-MCNC: <0.5 MG/DL (ref 0.6–1.2)
EOSINOPHILS ABSOLUTE: 0.8 K/UL (ref 0–0.6)
EOSINOPHILS RELATIVE PERCENT: 8.5 %
GFR AFRICAN AMERICAN: >60
GFR NON-AFRICAN AMERICAN: >60
GLUCOSE BLD-MCNC: 93 MG/DL (ref 70–99)
HCT VFR BLD CALC: 29.5 % (ref 36–48)
HEMOGLOBIN: 9.5 G/DL (ref 12–16)
LYMPHOCYTES ABSOLUTE: 1.8 K/UL (ref 1–5.1)
LYMPHOCYTES RELATIVE PERCENT: 20.7 %
MCH RBC QN AUTO: 27.4 PG (ref 26–34)
MCHC RBC AUTO-ENTMCNC: 32.1 G/DL (ref 31–36)
MCV RBC AUTO: 85.3 FL (ref 80–100)
MONOCYTES ABSOLUTE: 0.6 K/UL (ref 0–1.3)
MONOCYTES RELATIVE PERCENT: 6.3 %
NEUTROPHILS ABSOLUTE: 5.6 K/UL (ref 1.7–7.7)
NEUTROPHILS RELATIVE PERCENT: 64.1 %
PDW BLD-RTO: 16.7 % (ref 12.4–15.4)
PLATELET # BLD: 223 K/UL (ref 135–450)
PMV BLD AUTO: 7.2 FL (ref 5–10.5)
POTASSIUM REFLEX MAGNESIUM: 3.8 MMOL/L (ref 3.5–5.1)
RBC # BLD: 3.46 M/UL (ref 4–5.2)
SODIUM BLD-SCNC: 138 MMOL/L (ref 136–145)
WBC # BLD: 8.8 K/UL (ref 4–11)

## 2022-08-17 PROCEDURE — 97535 SELF CARE MNGMENT TRAINING: CPT

## 2022-08-17 PROCEDURE — 6370000000 HC RX 637 (ALT 250 FOR IP): Performed by: STUDENT IN AN ORGANIZED HEALTH CARE EDUCATION/TRAINING PROGRAM

## 2022-08-17 PROCEDURE — 1280000000 HC REHAB R&B

## 2022-08-17 PROCEDURE — 97530 THERAPEUTIC ACTIVITIES: CPT

## 2022-08-17 PROCEDURE — 92610 EVALUATE SWALLOWING FUNCTION: CPT

## 2022-08-17 PROCEDURE — 92523 SPEECH SOUND LANG COMPREHEN: CPT

## 2022-08-17 PROCEDURE — 6360000002 HC RX W HCPCS: Performed by: STUDENT IN AN ORGANIZED HEALTH CARE EDUCATION/TRAINING PROGRAM

## 2022-08-17 PROCEDURE — 97162 PT EVAL MOD COMPLEX 30 MIN: CPT

## 2022-08-17 PROCEDURE — 97166 OT EVAL MOD COMPLEX 45 MIN: CPT

## 2022-08-17 PROCEDURE — 80048 BASIC METABOLIC PNL TOTAL CA: CPT

## 2022-08-17 PROCEDURE — 36415 COLL VENOUS BLD VENIPUNCTURE: CPT

## 2022-08-17 PROCEDURE — 85025 COMPLETE CBC W/AUTO DIFF WBC: CPT

## 2022-08-17 RX ORDER — IPRATROPIUM BROMIDE AND ALBUTEROL SULFATE 2.5; .5 MG/3ML; MG/3ML
1 SOLUTION RESPIRATORY (INHALATION) EVERY 4 HOURS PRN
Status: DISCONTINUED | OUTPATIENT
Start: 2022-08-17 | End: 2022-09-02 | Stop reason: HOSPADM

## 2022-08-17 RX ADMIN — HEPARIN SODIUM 5000 UNITS: 5000 INJECTION INTRAVENOUS; SUBCUTANEOUS at 21:27

## 2022-08-17 RX ADMIN — LEVOTHYROXINE SODIUM 125 MCG: 0.12 TABLET ORAL at 06:47

## 2022-08-17 RX ADMIN — OXYCODONE AND ACETAMINOPHEN 1 TABLET: 5; 325 TABLET ORAL at 16:23

## 2022-08-17 RX ADMIN — PANTOPRAZOLE SODIUM 40 MG: 40 TABLET, DELAYED RELEASE ORAL at 06:47

## 2022-08-17 RX ADMIN — OXYCODONE AND ACETAMINOPHEN 1 TABLET: 5; 325 TABLET ORAL at 07:57

## 2022-08-17 RX ADMIN — MIRTAZAPINE 30 MG: 15 TABLET, FILM COATED ORAL at 21:26

## 2022-08-17 RX ADMIN — ONDANSETRON 4 MG: 4 TABLET, ORALLY DISINTEGRATING ORAL at 06:49

## 2022-08-17 RX ADMIN — HEPARIN SODIUM 5000 UNITS: 5000 INJECTION INTRAVENOUS; SUBCUTANEOUS at 06:46

## 2022-08-17 RX ADMIN — OXYCODONE AND ACETAMINOPHEN 1 TABLET: 5; 325 TABLET ORAL at 12:29

## 2022-08-17 RX ADMIN — NADOLOL 20 MG: 20 TABLET ORAL at 12:32

## 2022-08-17 RX ADMIN — METHOCARBAMOL TABLETS 750 MG: 750 TABLET, COATED ORAL at 16:25

## 2022-08-17 RX ADMIN — ACETAMINOPHEN 1000 MG: 500 TABLET ORAL at 07:56

## 2022-08-17 RX ADMIN — OXYCODONE AND ACETAMINOPHEN 1 TABLET: 5; 325 TABLET ORAL at 21:26

## 2022-08-17 RX ADMIN — SERTRALINE HYDROCHLORIDE 200 MG: 50 TABLET ORAL at 07:55

## 2022-08-17 RX ADMIN — ONDANSETRON 4 MG: 4 TABLET, ORALLY DISINTEGRATING ORAL at 21:26

## 2022-08-17 RX ADMIN — METHOCARBAMOL TABLETS 750 MG: 750 TABLET, COATED ORAL at 21:26

## 2022-08-17 RX ADMIN — METHOCARBAMOL TABLETS 750 MG: 750 TABLET, COATED ORAL at 07:55

## 2022-08-17 RX ADMIN — ONDANSETRON 4 MG: 4 TABLET, ORALLY DISINTEGRATING ORAL at 16:24

## 2022-08-17 RX ADMIN — HEPARIN SODIUM 5000 UNITS: 5000 INJECTION INTRAVENOUS; SUBCUTANEOUS at 16:25

## 2022-08-17 ASSESSMENT — PAIN DESCRIPTION - DESCRIPTORS
DESCRIPTORS: ACHING

## 2022-08-17 ASSESSMENT — PAIN DESCRIPTION - LOCATION
LOCATION: BACK

## 2022-08-17 ASSESSMENT — PAIN SCALES - GENERAL
PAINLEVEL_OUTOF10: 5
PAINLEVEL_OUTOF10: 8
PAINLEVEL_OUTOF10: 7
PAINLEVEL_OUTOF10: 6

## 2022-08-17 ASSESSMENT — PAIN - FUNCTIONAL ASSESSMENT
PAIN_FUNCTIONAL_ASSESSMENT: PREVENTS OR INTERFERES SOME ACTIVE ACTIVITIES AND ADLS

## 2022-08-17 ASSESSMENT — PAIN DESCRIPTION - ORIENTATION
ORIENTATION: MID;LOWER
ORIENTATION: LOWER
ORIENTATION: MID;LOWER

## 2022-08-17 NOTE — PATIENT CARE CONFERENCE
[facility]  Inpatient Rehabilitation  Weekly Team Conference Note    Date: 2022  Patient Name: Martha Johnson        MRN: [id number]    : 1955  (69 y.o.)  Gender: female   Referring Practitioner: Tirso Maloney MD  Diagnosis: L1 burst fx s/p T11-L3 PDFF with L1 corpectomy,       Interventions to be utilized toward barriers to discharge, per discipline:  [address] observed barriers to dc: Pain, Limited safety awareness, Decreased endurance, and Lower extremity weakness  Nursing interventions:Teaching medications for pain  Family Education:   Fall Risk:  Yes      PHYSICAL THERAPY  Evaluation in progress. Goals and POC to be established this date. OCCUPATIONAL THERAPY  Evaluation in progress. Goals and POC to be established this date. Occupational Therapy interventions:  Current Treatment Recommendations: Strengthening, ROM, Balance training, Functional mobility training, Endurance training, Gait training, Neuromuscular re-education, Safety education & training, Pain management, Patient/Caregiver education & training, Equipment evaluation, education, & procurement, Modalities, Positioning, Self-Care / ADL, Home management training, Cognitive/Perceptual training, Coordination training      SPEECH THERAPY   Evaluation in progress. Goals and POC to be established this date. NUTRITION  Weight: 138 lb 3.2 oz (62.7 kg) / Body mass index is 25.28 kg/m². Diet Order: ADULT DIET; Regular  Education: Pending assessment       CASE MANAGEMENT  Assessment: 69 yr old female. Dx:H/O Spinal surgery. CM acknowledged social work/CM consult for discharge planning. Interdisciplinary Goals:   1.) Therapy evaluations in progress. Goals and POC to be established this date.       []  Family Training discussed at conference and to be scheduled.       Discharge Plan   Estimated discharge date: 2022  Destination: home health  Pass:No  Services at Discharge: Home Health  Physical Therapy, Occupational Therapy, Speech Therapy (TBD pending progress in speech therapy), and Nursing   Equipment at Discharge: Shower chair, BSC, other DME TBD pending progress. Team Members Present at Conference:  : Andrea Lucas RN    Occupational Therapist: Jani Pollack, OT  Physical [address], PT  Speech Therapist: Carl Umaña, [address]  Nurse: Salome Parish RN  Dietician: Rebecca Lorenzo, [address] [place] [address]  : Tina Alvarado OTR/L  Psychiatry: N/A    Family members present at conference: No      I led this team conference and I approve the established interdisciplinary plan of care as documented within the medical record of Erin Hartmann MD: Electronically signed by Aury Prakash MD on 8/17/2022 at 3:59 PM

## 2022-08-17 NOTE — PROGRESS NOTES
Occupational Therapy  Facility/Department: [facility]  Rehabilitation Occupational Therapy Evaluation/Treatment Note       Date: 22  Patient Name: Marion Vivas       Room: [facility]42-93  MRN: [id number]  Account: [de-identified]   : 1955  (73 y.o.) Gender: female     Referring Practitioner: Dr. Redd Leaver  Diagnosis: Back surgery       Restrictions  Restrictions/Precautions: General Precautions; Fall Risk;Up as Tolerated  Spinal Precautions: TLSO when OOB, can jamila EOB; no lifting >10 lbs  Other position/activity restrictions: O2, TLSO  Equipment Used: Wheelchair;Bed    Subjective  Subjective: Pt in bed, agreeable despite pain. Pt reports 6/10 pain at start, 4/10 pain after shower/ambulation. BP WFL, O2 dropping to 80's with 4L O2 needing increased time, PLB, and O2 up to 6L  to recovery. RN aware. Comments: RN cleared pt for eval/tx; pt okay to shower per orders from           Vitals  Resp: 16  Height: 5' 2\" (157.5 cm)  Level of Consciousness: Alert (0)    Objective  Vision - Basic Assessment  Prior Vision: Wears glasses only for reading  Visual History: No significant visual history  Visual Field Cut: No  Oculo Motor Control: WNL  Cognition  Overall Cognitive Status: Exceptions  Arousal/Alertness: Appropriate responses to stimuli  Following Commands: Follows all commands without difficulty; Follows multistep commands with repitition  Attention Span: Attends with cues to redirect  Memory: Appears intact  Safety Judgement: Decreased awareness of need for safety  Problem Solving: Assistance required to identify errors made;Assistance required to correct errors made;Assistance required to implement solutions;Assistance required to generate solutions  Insights: Decreased awareness of deficits  Initiation: Requires cues for some  Sequencing: Requires cues for some  Orientation  Overall Orientation Status: Within Normal Limits  Orientation Level: Oriented X4   Perception  Overall Perceptual Status: Impaired  Unilateral Attention: Appears intact  Initiation: Cues to initiate tasks  Motor Planning: Appears intact  Perseveration: Not present  Sensation  Overall Sensation Status: WNL   ROM  LUE AROM (degrees)  LUE AROM : WFL  Left Hand AROM (degrees)  Left Hand AROM: WFL  RUE AROM (degrees)  RUE AROM : WFL  Right Hand AROM (degrees)  Right Hand AROM: WFL  LUE Strength  Gross LUE Strength: WFL  L Hand General: 3/5  RUE Strength  Gross RUE Strength: WFL  R Hand General: 3/5  Fine Motor Skills  Coordination  Movements Are Fluid And Coordinated: Yes  Coordination and Movement Description: Fine motor impairments       Hand Assessment  Hand Dominance  Hand Dominance: Right  Functional Mobility  Balance  Sitting Balance: Contact guard assistance  Standing Balance: Minimal assistance  Standing Balance  Time: 2x30 seconds, 2x1-2 minutes, 2x1-2 minutes, x1 minute, x30 seconds  Activity: transfers and ADLs  Comment: cues for hand placement, limited stanidng tolerance due to R groin pain in stance, decreased strength in RLE and posture  Transfers  Stand Step Transfers: Minimal assistance  Sit to stand: Minimal assistance  Stand to sit: Minimal assistance  Transfer Comments: grab bar vs RW  Toilet Transfers  Toilet - Technique: Stand step; To left; To right (grab bar vs RW)  Equipment Used: Standard toilet  Toilet Transfer: Maximum assistance  Toilet Transfers Comments: assist with lifting and lowering, would benefit from [facility] over toilet with RW vs grab bar for support  Tub Transfers  Tub - Technique:  (RW)  Shower Transfers  Shower - Transfer From: [place] [employer] [place] - Transfer Type: To and From  Shower - Transfer To: Transfer tub bench  Shower - Technique: To left; To right;Stand pivot  Shower Transfers:  Moderate assistance  Shower Transfers Comments: modA sit <> stand and stand step with grab bar, decreased strength in RLE  Wheelchair Bed Transfers  Wheelchair/Bed - Technique: Stand step  Equipment Used: Wheelchair;Bed  Level of Asssistance: Moderate assistance  Wheelchair Transfers Comments: modA sit <> stand and stand step with RW, decreased strength in RLE  Social/Functional History  Lives With: Spouse  Type of Home: House  Home Layout: One level;Performs ADL's on one level  Home Access: Ramped entrance  Bathroom Shower/Tub: Tub/Shower unit; Shower chair with back; Walk-in shower  Bathroom Toilet: Standard  Bathroom Equipment: Grab bars in shower;Grab bars around toilet;Hand-held shower  Bathroom Accessibility: Accessible; Wheelchair accessible;Walker accessible  Home Equipment: Rollator;Walker, rolling; [address]  Has the patient had two or more falls in the past year or any fall with injury in the past year?: Yes  Receives Help From: Family  ADL Assistance: [address]: Independent  Homemaking Responsibilities: Yes  Ambulation Assistance: Independent (4ww)  Transfer Assistance: Independent (4ww)  Active : Yes  Mode of Transportation: Car  Occupation: Retired  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  Leisure & Hobbies: knitting/sewing, being with  and dog  IADL Comments:  assist with IADLs at times does work third shift  ADL  Feeding: Beverage management;Setup;Stand by assistance  Feeding Skilled Clinical Factors: EOB, WC  Grooming: Moderate assistance; Increased time to complete;Verbal cueing  Grooming Skilled Clinical Factors: modA for standing balance grooming, sitting sinkside to complete-- assist with brushing hair  UE Bathing: Increased time to complete;Minimal assistance;Verbal cueing  LE Bathing: Increased time to complete;Maximum assistance;Verbal cueing  LE Bathing Skilled Clinical Factors: able to complete lynnette-care, assist with all other tasks  UE Dressing: Moderate assistance; Increased time to complete;Verbal cueing  UE Dressing Skilled Clinical Factors: assist with shirt over head and donning/doffing TLSO  LE Dressing: Dependent/Total;Increased time to complete;Verbal cueing  LE Dressing Skilled Clinical Factors: assist with threading/dethreading, donning/doffing socks seated EOB vs TTB, assist with pants/brief management in stance with modA for balance with RW vs grab bar R vs L  Toileting: Moderate assistance; Increased time to complete  Toileting Skilled Clinical Factors: modA for balance and pants management up/down, lynnette-care seated SPV  Additional Comments: Increased time and seated rest breaks, poor balance/endurance in standing    Goals  Patient Goals   Patient goals : \"To go home and be out of pain\"  Short Term Goals  Time Frame for Short term goals: 5 days (8/21/22)  Short Term Goal 1: Pt will complete standing task x3 minutes SBA with LRAD  Short Term Goal 2: Pt will complete toileting/transfer CGA with LRAD and DME PRN  Short Term Goal 3: Pt will complete LE dressing Valeria using AE and LRAD PRN  Long Term Goals  Time Frame for Long term goals : 10 days (8/26/22)  Long Term Goal 1: Pt will complete functional mobility in room/bathroom Rachael with LRAD for ADL management  Long Term Goal 2: Pt will complete IADL task standing vs sitting Rachael with LRAD  Long Term Goal 3: Pt will complete UE/LE bathing/dressing including donning/doffing brace (patient vs family education teachback) setup vs Rachael sitting vs standing with AE vs LRAD  Long Term Goal 4: Pt will complete toileting/transfer with LRAD and DME PRN Rachael    Assessment  Performance deficits / Impairments: Decreased functional mobility ; Decreased safe awareness;Decreased balance;Decreased cognition;Decreased ADL status; Decreased ROM; Decreased endurance;Decreased sensation;Decreased strength;Decreased posture;Decreased coordination;Decreased fine motor control  Assessment: Pt is a 69 y.o. female with history of L1 burst fracture and failure of conservative management now s/p T11-L3 PDFF with L1 corpectomy, with intraoperative CSF leak repaired.   Prior to admission pt was independent using 4ww and grab bars with tranfsers, ADLs, and mobility. Currently pt is functioning below baseline needing Valeria for bed mobility, min-modA for transfers/mobility using grab bars vs RW, modA for UE ADLs, max-depA for LE ADLs and limited by pain, endurance, and back px. OT skilled services are indicated in the acute rehab setting to address above deficits and f/u with patient education. At this time it will be recommended pt DC with assist PRN vs 24 hour SPV with DME: TBD, possible BSC vs SC. Prognosis: Fair  Decision Making: Medium Complexity  Discharge Recommendations: 24 hour supervision or assist;Home with assist PRN;Home with nursing aide;Home with Home health OT;S Level 3  Plan  Times per Week: 5 out of 7 days  Times per Day: Daily  Plan Weeks: 10 days  Current Treatment Recommendations: Strengthening;ROM;Balance training;Functional mobility training; Endurance training;Gait training;Neuromuscular re-education; Safety education & training;Pain management;Patient/Caregiver education & training;Equipment evaluation, education, & procurement; Modalities; Positioning;Self-Care / ADL; Home management training;Cognitive/Perceptual training;Coordination training     Signs and Symptoms of Delirium (from CAM)  A. Acute Onset Mental Status Change  Is there evidence of an acute change in mental status from the patient's baseline? [x] 0. No [] 1. Yes    B. Inattention: Did the patient have difficulty focusing attention, for example being easily distractible or having difficulty keeping track of what was being said? [x] 0. Behavior not present    [] 1. Behavior continuously present, does not fluctuate   [] 2. Behavior present, fluctuates (comes and goes, changes in severity)    C. Disorganized thinking: Was the patient's thinking disorganized or incoherent (rambling or irrelevant conversation, unclear or illogical flow of ideas, or unpredictable switching from subject to subject)? [x] 0. Behavior not present    [] 1.  Behavior continuously present, does not fluctuate   [] 2. Behavior present, fluctuates (comes and goes, changes in severity)    D. Altered level of consciousness: Did the patient have altered level of consciousness as indicated by any of the following criteria? Vigilant: startled easily to any sound or touch  Lethargic: repeatedly dozed off when being asked questions, but responded to voice or touch  Stuporous: very difficult to arouse and keep aroused for the interview  Comatose: could not be aroused  [x] 0. Behavior not present    [] 1. Behavior continuously present, does not fluctuate   [] 2. Behavior present, fluctuates (comes and goes, changes in severity)    Ability to hear (with hearing aid or hearing appliance if normally used)  [x] 0. Adequate - no difficulty in normal conversation, social interaction, listening to TV  [] 1. Minimal difficulty - difficulty in some environments (e.g., when person speaks softly or setting is noisy)  [] 2. Moderate difficulty - speaker has to increase volume and speak distinctly  [] 3. Highly impaired - absence of useful hearing    Ability to see in adequate light (with glasses or other visual appliances)  [x] 0. Adequate - sees fine detail, such as regular print in newspapers/books  [] 1. Impaired - sees large print, but not regular print in newspapers/books  [] 2. Moderately impaired - limited vision; not able to see newspaper headlines but can identify objects  [] 3. Highly impaired - object identification in question, but eyes appear to follow objects  [] 4.  Severely impaired - no vision or sees only light, colors or shapes; eyes do not appear to follow objects    Therapy Time   Individual Concurrent Group Co-treatment   Time In 0930         Time Out 1100         Minutes 90         Timed Code Treatment Minutes: 75 Minutes (15 min eval)       KIANA Alva/LORRI

## 2022-08-17 NOTE — PROGRESS NOTES
Speech Language Pathology  Clinical Bedside Swallow Assessment  Facility/Department: [facility]        Recommendations:  Diet recommendation: IDDSI 7 Regular Solids; IDDSI 0 Thin Liquids; Meds whole with thin liquids; 1 at a time   Instrumentation: Not clinically indicated. Will continue to monitor. Risk management: upright for all intake, stay upright for at least 30 mins after intake, small bites/sips, alternate bites/sips, slow rate of intake, general GERD precautions, and general aspiration precautions      Agapito Prieto  : 1955 (69 y.o.)   MRN: [id number]  ROOM: [address] [place] [address]  ADMISSION DATE: 2022  PATIENT DIAGNOSIS(ES): H/O Spinal surgery [Z98.890]  No chief complaint on file.     Patient Active Problem List    Diagnosis Date Noted    H/O Spinal surgery 2022    Hypomagnesemia 07/15/2022    Depression 07/15/2022    Chronic diastolic congestive heart failure ([address] 07/15/2022    Interstitial lung disease ([address] 2022    Burst fracture of lumbar vertebra (HCC) 2022    Rib pain on left side     Elevated brain natriuretic peptide (BNP) level     Fracture of multiple ribs of left side 2022    Acute on chronic respiratory failure ([address] 2022    VIRGINIE (acute kidney injury) ([address]     Lumbar radiculopathy     Toxic metabolic encephalopathy     GERD (gastroesophageal reflux disease)     Left wrist pain 2020    Left wrist tendinitis 2020    Acute congestive heart failure ([address] 2020    Ulnar neuropathy at elbow of left upper extremity 2020    Numbness and tingling in left hand 01/10/2020    Hyponatremia 2019    Type 2 diabetes mellitus without complication ([address]     Cryptogenic organizing pneumonia ([address] 2019    Pneumothorax of left lung after biopsy 2019    COPD (chronic obstructive pulmonary disease) ([address] 2019    Chronic respiratory failure with hypoxia (HCC)     Shortness of breath 2019    Acute cystitis without hematuria     Abnormal CT of the chest     SOB (shortness of breath)     Acute on chronic respiratory failure with hypoxia (HCC)     Restrictive lung disease     Abnormal chest CT     Acute diastolic heart failure (HCC)     ILD (interstitial lung disease) ([facility]     Atypical pneumonia     Acute bronchitis with bronchospasm     Acute on chronic diastolic CHF (congestive heart failure) ([facility])     Class 2 obesity with body mass index (BMI) of 39.0 to 39.9 in adult     A-fib ([facility]     Pneumonia of both lower lobes due to infectious organism 06/16/2019    Mild single current episode of major depressive disorder ([address] 05/02/2019    Anxiety 05/02/2019    Hypothyroidism 07/31/2018    Acute blood loss anemia 04/25/2016    Fatigue 04/25/2016    Migraine 08/04/2014    Syncope 08/04/2014    HTN (hypertension) 04/08/2014    Hyperlipidemia with target LDL less than 130 04/08/2014    Hypokalemia 04/08/2014    Insomnia 04/08/2014    Trochanteric bursitis of both hips 04/08/2014    Chest pain 04/02/2014     Past Medical History:   Diagnosis Date    A-fib [facility])     Anxiety     Cryptogenic organizing pneumonia ([facility]     Depression     GERD (gastroesophageal reflux disease)     Hyperlipidemia     On home oxygen therapy     uses O2 3L prn    Rash     Thyroid disease     hypothyroidism     Past Surgical History:   Procedure Laterality Date    ARM SURGERY Left 3/2/2020    LEFT ULNAR NERVE DECOMPRESSION AT THE ELBOW performed by Charlotte Garcia MD at [facility] N/A 6/27/2019    EBUS WF W/ANES.  performed by Daisy Mcgee MD at [address] [place] [address]  6/27/2019    BRONCHOSCOPY/TRANSBRONCHIAL NEEDLE BIOPSY performed by Daisy Mcgee MD at [facility]  6/27/2019    BRONCHOSCOPY/TRANSBRONCHIAL LUNG BIOPSY performed by Daisy Mcgee MD at [facility]  6/27/2019    BRONCHOSCOPY ALVEOLAR LAVAGE performed by Daisy Mcgee MD at [address] [facility] BRONCHOSCOPY  07/10/2019    BRONCHOSCOPY N/A 7/10/2019    BRONCHOSCOPY ALVEOLAR LAVAGE performed by Mansi Mccarty MD at [place] [address] Bilateral 08/06/2019    BRONCHOSCOPY N/A 8/6/2019    BRONCHOSCOPY ALVEOLAR LAVAGE performed by Marcia Teixeira MD at [place] [address] N/A 12/24/2019    BRONCHOSCOPY ALVEOLAR LAVAGE performed by Bong Reyes MD at [facility], TOTAL ABDOMINAL (CERVIX REMOVED)      partial     Allergies   Allergen Reactions    Penicillins Anaphylaxis     Tolerates Meropenem. Cefuroxime      Tolerates Meropenem. Doxycycline Hives    Imitrex [Sumatriptan] Other (See Comments)     Feels like pins and needles    Meperidine     Other Other (See Comments)     Blood pressure drops, light headed    Sulfa Antibiotics Hives    Keflex [Cephalexin] Itching and Rash     Tolerates Meropenem. Tape Dietra Za Tape] Rash       DATE ONSET: Pt admitted to [facility] 08/11/22. Pt admitted to [address] [place] [address] 08/16/2022. Date of Evaluation: 8/17/2022   Evaluating Therapist: TEDDY Michele    Chart Reviewed: : [x] Yes [] No    Current Diet: ADULT DIET; Regular    Recent Chest Radiography: [x] Chest XR   [] CT of Chest  Date: 08/14/2022 at [address]:   Pulmonary edema pattern. Pain:back pain; RN gave pain meds     Reason for Referral  Yolette Sample was referred for a bedside swallow evaluation to assess the efficiency of their swallow function, identify signs and symptoms of aspiration and make recommendations regarding safe dietary consistencies, effective compensatory strategies, and safe eating environment.     Assessment    Medical record review/interview: MD H&P not completed at time of eval.     Predisposing dysphagia risk factors: COPD, Other chronic respiratory illness, and Environmental barriers  Clinical signs of possible chronic dysphagia: N/A  Precipitating dysphagia risk factors: reduced physical mobility and increased O2 demands    Patient Complaints:  Odynophagia: [] Yes [x] No  Globus Sensation: [x] Yes [] No  SOB with PO intake: [] Yes [x] No  Increased WOB with PO intake: [] Yes [x] No  Reflux Sx's: [x] Yes [] No  Weight loss: [] Yes [x] No  Coughing/Choking with PO intake: [] Yes [x] No  Reduced Appetite: [] Yes [x] No    Additional Reported Symptoms/Complaints/Hospital Course:   Pt reports that she has a \"nodule in my throat\" that \"pills get stuck on and I sometimes have to cough them up. \" Pt also reports that \"I have to stop and think. .. go through the motions to swallow. \" Pt with complaints of phlegm, pills getting stuck (pointing to chest). Pt reports hx of reflux. Pt's goals: \"go home\"     Vitals/labs:   Temp: n/a  SpO2: 90%  RR: 20-24  BP: 118/66  HR: 99  O2 device: 3L O2 via NC     CBC:   Recent Labs     08/17/22  0634   WBC 8.8   HGB 9.5*         BMP:  Recent Labs     08/17/22  0634      K 3.8      CO2 29   BUN 8   CREATININE <0.5*   GLUCOSE 93          Cranial nerve exam:   CN V (trigeminal): ophthalmic, maxillary, and mandibular facial sensation- WNL  CN VII (facial): WNL  CN IX/X (glossopharyngeal/vagus): MPT: DNT;; pitch range: DNT; vocal quality: hoarse and breathy; cough: Weak- perceptually  CN XII (hypoglossal): WNL    Laryngeal function exam:   Secretions: WFL  Vocal quality: See CN exam above  MPT: See CN exam above  S/Z ratio: DNT  Pitch range: See CN exam above  Cough: See CN exam above    Oral Care Status:    [] Oral Care [facility]  [x] Poor oral care status  [] Edentulous  [] Upper Dentures  [] Lower Dentures  [x] Missing/Broken Teeth  [] Evidence of dental cavities/carries    PO trials:   IDDSI 0 (thin): via straw. Suspect timely swallow, adequate laryngeal elevation. No overt s/s of aspiration. IDDSI 4 (puree): Pt declined     IDDSI 5 (minced and moist): Adequate mastication, A-P transit, oral clearance. No overt s/s of aspiration.      IDDSI 6 (soft and bite sized): Pt Patient/Family Education  []  Pharyngeal Exercises  []  Therapeutic PO trials with SLP  [x]  Diet tolerance monitoring  []  Other:     Referrals:  N/A    Goals:  Short Term Goals:  Timeframe for Short Term Goals: (5 days; 0030/0794)  Goal 1: The patient will tolerate recommended diet with no clinical s/s of aspiration 5/5  Goal 2: The patient/caregiver will demonstrate understanding of compensatory swallow strategies, for improved swallow safety    Long Term Goals:   Timeframe for Long Term Goals: (10 days; 08/26/2022)  Goal 1: The patient will tolerate least restrictive diet with no clinical s/s of aspiration or worsening respiratory/pulmonary status    Treatment:  Skilled instruction completed with patient re: evidenced based practice regarding recommendations and POC, importance of oral care to reduce adverse affects in the event of aspiration, and instruction of recommended compensatory strategies developed based upon clinical exam. Pt able to recall/demonstrate compensatory strategies with min cues. Pt Education: SLP educated the patient re: Role of SLP, rationale for completion of assessment, results of assessment, recommendations, and POC  Pt Education Response: verbalized understanding    Duration/Frequency of Tx: 5x/week for LOS     Individuals Consulted:   [x]  Patient     []  NP         [x]  RN   []  RD                   []  MD      []  Family Member                        []  PA    []  Other:      Safety Devices / Report:  [x]  All fall risk precautions in place []  Safety handoff completed with RN  [x]  Bed alarm in place  []  Left in bed     []  Chair alarm in place  []  Left in chair   [x]  Call light in reach   []  Other:                          Total Treatment Time / Charges       Time in Time out Total Time / units   Swallow Eval/Tx Time  0845 0900 15 min / 1 unit     Signature:  Giuliana Sherwood MA [address] [facility]  Speech Language Pathologist

## 2022-08-17 NOTE — H&P
Patient: Noelia Nissen  [id number]  Date: 8/17/2022      Chief Complaint: L1 burst fracture     History of Present Illness/Hospital Course: Isauro Sherwood is a 69year old female with a past medical history significant for chronic respiratory failure on 3 L home O2, ILD, COPD, paroxysmal atrial fibrillation, DM2, HTN, HLD, and thyroid disease who presented to  on 8/11/22 for planned T11-L3 PDFF with L1 corpectomy for L1 burst fracture. Intraoperatively she was found to have a CSF leak which was repaired. Post-operative course was notable for hypokalemia and pain. She was admitted to [facility] on 8/16/22 due to functional deficits below her baseline. Today she is seen in her room without family present. She reports continued severe back pain. She notes at home she was taking tramadol. She denies other acute complaints at this time. She is very motivated to work with therapies. Prior Level of Function:  Independent in self care, indoor mobility, stairs, functional cognition     Current Level of Function:  Setup for eating, oral hygiene  Supervision for sit to stand, chair/bed transfer, toilet transfer  Mod assist for sit to stand  Max assist for toileting hygiene, upper body dressing     has a past medical history of A-fib ([address] Anxiety, Cryptogenic organizing pneumonia ([address] Depression, GERD (gastroesophageal reflux disease), Hyperlipidemia, On home oxygen therapy, Rash, and Thyroid disease. has a past surgical history that includes Cholecystectomy; bronchoscopy (N/A, 6/27/2019); bronchoscopy (6/27/2019); bronchoscopy (6/27/2019); bronchoscopy (6/27/2019); bronchoscopy (07/10/2019); bronchoscopy (N/A, 7/10/2019); Hysterectomy, total abdominal; bronchoscopy (Bilateral, 08/06/2019); bronchoscopy (N/A, 8/6/2019); bronchoscopy (N/A, 12/24/2019); and Arm Surgery (Left, 3/2/2020). reports that she has never smoked.  She has never used smokeless tobacco. She reports that she does not drink alcohol and does not use drugs. family history includes Asthma in her sister; Diabetes in her mother; High Blood Pressure in her mother; Other in her father.     Current Facility-Administered Medications   Medication Dose Route Frequency Provider Last Rate Last Admin    ipratropium-albuterol (DUONEB) nebulizer solution 1 ampule  1 ampule Inhalation Q4H PRN Shy Ma MD        heparin (porcine) injection 5,000 Units  5,000 Units SubCUTAneous 3 times per day Shy Ma MD   5,000 Units at 08/17/22 0646    methocarbamol (ROBAXIN) tablet 750 mg  750 mg Oral TID Shy Ma MD   750 mg at 08/17/22 0755    oxyCODONE-acetaminophen (PERCOCET) 5-325 MG per tablet 1 tablet  1 tablet Oral Q4H PRN Shy Ma MD   1 tablet at 08/17/22 0757    senna (SENOKOT) tablet 8.6 mg  1 tablet Oral BID Shy Ma MD   8.6 mg at 08/17/22 0755    acetaminophen (TYLENOL) tablet 1,000 mg  1,000 mg Oral Q8H PRN Shy Ma MD   1,000 mg at 08/17/22 0756    albuterol sulfate HFA (PROVENTIL;VENTOLIN;PROAIR) 108 (90 Base) MCG/ACT inhaler 2 puff  2 puff Inhalation Q6H PRN Shy Ma MD        atorvastatin (LIPITOR) tablet 40 mg  40 mg Oral Daily Shy Ma MD   40 mg at 08/17/22 0757    benzonatate (TESSALON) capsule 100 mg  100 mg Oral TID PRN Shy Ma MD        [START ON 8/21/2022] vitamin D (ERGOCALCIFEROL) capsule 50,000 Units  50,000 Units Oral Weekly Shy Ma MD        furosemide (LASIX) tablet 40 mg  40 mg Oral Daily PRN Shy Ma MD        hydrOXYzine HCl (ATARAX) tablet 10 mg  10 mg Oral TID PRN Shy Ma MD        levothyroxine (SYNTHROID) tablet 125 mcg  125 mcg Oral Daily Shy Ma MD   125 mcg at 08/17/22 0647    cetirizine (ZYRTEC) tablet 10 mg  10 mg Oral Daily Shy Ma MD   10 mg at 08/17/22 0756    mirtazapine (REMERON) tablet 30 mg  30 mg Oral Nightly Shy Ma MD   30 mg at 08/16/22 4032 nadolol (CORGARD) tablet 20 mg  20 mg Oral Daily Bolivar Camejo MD        naloxone [facility]) injection 0.4 mg  0.4 mg IntraVENous PRN Bolivar Camejo MD        pantoprazole (PROTONIX) tablet 40 mg  40 mg Oral QAM AC Bolivar Camejo MD   40 mg at 08/17/22 0647    potassium chloride (KLOR-CON M) extended release tablet 20 mEq  20 mEq Oral Daily Bolivar Camejo MD   20 mEq at 08/17/22 0756    sertraline (ZOLOFT) tablet 200 mg  200 mg Oral Daily Bolivar Camejo MD   200 mg at 08/17/22 0750    ondansetron (ZOFRAN-ODT) disintegrating tablet 4 mg  4 mg Oral Q8H PRN Bolivar Camejo MD   4 mg at 08/17/22 515    bisacodyl (DULCOLAX) suppository 10 mg  10 mg Rectal Daily PRN Bolivar Camejo MD        Pirfenidone TABS 801 mg (Patient Supplied)  801 mg Oral TID WC Bolivar Camejo MD   801 mg at 08/17/22 0752       Allergies   Allergen Reactions    Penicillins Anaphylaxis     Tolerates Meropenem. Cefuroxime      Tolerates Meropenem. Doxycycline Hives    Imitrex [Sumatriptan] Other (See Comments)     Feels like pins and needles    Meperidine     Other Other (See Comments)     Blood pressure drops, light headed    Sulfa Antibiotics Hives    Keflex [Cephalexin] Itching and Rash     Tolerates Meropenem.     Tape [place] [facility]] Rash       Current Facility-Administered Medications   Medication Dose Route Frequency Provider Last Rate Last Admin    ipratropium-albuterol (DUONEB) nebulizer solution 1 ampule  1 ampule Inhalation Q4H PRN Bolivar Camejo MD        heparin (porcine) injection 5,000 Units  5,000 Units SubCUTAneous 3 times per day Bolivar Camejo MD   5,000 Units at 08/17/22 0646    methocarbamol (ROBAXIN) tablet 750 mg  750 mg Oral TID Bolivar Camejo MD   750 mg at 08/17/22 0753    oxyCODONE-acetaminophen (PERCOCET) 5-325 MG per tablet 1 tablet  1 tablet Oral Q4H PRN Bolivar Camejo MD   1 tablet at 08/17/22 0758    senna (SENOKOT) tablet 8.6 mg  1 tablet Oral BID Bernadette Gray MD   8.6 mg at 08/17/22 0755    acetaminophen (TYLENOL) tablet 1,000 mg  1,000 mg Oral Q8H PRN Bernadette Gray MD   1,000 mg at 08/17/22 0756    albuterol sulfate HFA (PROVENTIL;VENTOLIN;PROAIR) 108 (90 Base) MCG/ACT inhaler 2 puff  2 puff Inhalation Q6H PRN Bernadette Gray MD        atorvastatin (LIPITOR) tablet 40 mg  40 mg Oral Daily Bernadette Gray MD   40 mg at 08/17/22 0757    benzonatate (TESSALON) capsule 100 mg  100 mg Oral TID PRN Bernadette Gray MD        [START ON 8/21/2022] vitamin D (ERGOCALCIFEROL) capsule 50,000 Units  50,000 Units Oral Weekly Bernadette Gray MD        furosemide (LASIX) tablet 40 mg  40 mg Oral Daily PRN Bernadette Gray MD        hydrOXYzine HCl (ATARAX) tablet 10 mg  10 mg Oral TID PRN Bernadette Gray MD        levothyroxine (SYNTHROID) tablet 125 mcg  125 mcg Oral Daily Bernadette Gray MD   125 mcg at 08/17/22 0647    cetirizine (ZYRTEC) tablet 10 mg  10 mg Oral Daily Bernadette Gray MD   10 mg at 08/17/22 0756    mirtazapine (REMERON) tablet 30 mg  30 mg Oral Nightly Bernadette Gray MD   30 mg at 08/16/22 2058    nadolol (CORGARD) tablet 20 mg  20 mg Oral Daily Bernadette Gray MD        naloxone [facility]) injection 0.4 mg  0.4 mg IntraVENous PRN Bernadette Gray MD        pantoprazole (PROTONIX) tablet 40 mg  40 mg Oral QAM AC Bernadette Gray MD   40 mg at 08/17/22 0647    potassium chloride (KLOR-CON M) extended release tablet 20 mEq  20 mEq Oral Daily Bernadette Gray MD   20 mEq at 08/17/22 0756    sertraline (ZOLOFT) tablet 200 mg  200 mg Oral Daily Bernadette Gray MD   200 mg at 08/17/22 0755    ondansetron (ZOFRAN-ODT) disintegrating tablet 4 mg  4 mg Oral Q8H PRN Bernadette Gray MD   4 mg at 08/17/22 0649    bisacodyl (DULCOLAX) suppository 10 mg  10 mg Rectal Daily PRN Bernadette Gray MD        Pirfenidone TABS 801 mg (Patient Supplied)  801 mg Oral TID [facility] Russ Redmond MD   801 mg at 08/17/22 0759         REVIEW OF SYSTEMS:   CONSTITUTIONAL: negative for fevers, chills, diaphoresis, activity change, appetite change, fatigue, night sweats and unexpected weight change. EYES: negative for blurred vision, eye discharge, visual disturbance and icterus. HEENT: negative for hearing loss, tinnitus, ear drainage, sinus pressure, nasal congestion, epistaxis and snoring. RESPIRATORY: Negative for hemoptysis, cough, sputum production. CARDIOVASCULAR: negative for chest pain, palpitations, exertional chest pressure/discomfort, edema, syncope. GASTROINTESTINAL: negative for nausea, vomiting, diarrhea, constipation, blood in stool and abdominal pain. GENITOURINARY: negative for frequency, dysuria, urinary incontinence, decreased urine volume, and hematuria. HEMATOLOGIC/LYMPHATIC: negative for easy bruising, bleeding and lymphadenopathy. ALLERGIC/IMMUNOLOGIC: negative for recurrent infections, angioedema, anaphylaxis and drug reactions. ENDOCRINE: negative for weight changes and diabetic symptoms including polyuria, polydipsia and polyphagia. MUSCULOSKELETAL: positive for pain; denies joint swelling, decreased range of motion and muscle weakness. NEUROLOGICAL: negative for headaches, slurred speech, unilateral weakness. PSYCHIATRIC/BEHAVIORAL: negative for hallucinations, behavioral problems, confusion and agitation. All pertinent positives are noted in the HPI.     Physical Examination:  Vitals: Patient Vitals for the past 24 hrs:   BP Temp Temp src Pulse Resp SpO2 Height Weight   08/17/22 1001 -- -- -- -- -- -- 5' 2\" (1.575 m) --   08/17/22 0827 -- -- -- -- 16 -- -- --   08/17/22 0755 -- -- -- -- 16 -- -- --   08/16/22 2045 139/71 98.3 °F (36.8 °C) Oral 85 18 97 % -- --   08/16/22 2030 -- -- -- -- -- 95 % -- --   08/16/22 1746 -- -- -- -- -- -- 5' 2\" (1.575 m) 138 lb 3.2 oz (62.7 kg)   08/16/22 1715 133/72 98 °F (36.7 °C) Oral 82 20 93 % -- --     Psych: Stable mood, normal judgement, normal affect   Const: No distress  Eyes: Conjunctiva noninjected, no icterus noted; pupils equal, round, and reactive to light. HENT: Atraumatic, normocephalic; Oral mucosa moist  Neck: Trachea midline, neck supple. No thyromegaly noted. CV: Regular rate and rhythm, no murmur rub or gallop noted  Resp: Lungs clear to auscultation bilaterally, no rales wheezes or ronchi, no retractions. Respirations unlabored. GI: Soft, nontender, nondistended. Normal bowel sounds. No palpable masses. Neuro: Alert, oriented, appropriate. No cranial nerve deficits appreciated. Sensation intact to light touch. Motor examination reveals normal strength in all four limbs diffusely. Skin: Normal temperature and turgor  MSK: No joint abnormalities noted. Ext: No significant edema appreciated. No varicosities. Lab Results   Component Value Date    WBC 8.8 08/17/2022    HGB 9.5 (L) 08/17/2022    HCT 29.5 (L) 08/17/2022    MCV 85.3 08/17/2022     08/17/2022     Lab Results   Component Value Date    INR 1.17 (H) 07/10/2019    INR 1.38 (H) 06/27/2019    INR 1.42 (H) 06/26/2019    PROTIME 13.3 (H) 07/10/2019    PROTIME 15.7 (H) 06/27/2019    PROTIME 16.0 (H) 06/26/2019     Lab Results   Component Value Date    CREATININE <0.5 (L) 08/17/2022    BUN 8 08/17/2022     08/17/2022    K 3.8 08/17/2022     08/17/2022    CO2 29 08/17/2022     Lab Results   Component Value Date    ALT 11 07/14/2022    AST 26 07/14/2022    ALKPHOS 138 (H) 07/14/2022    BILITOT 0.9 07/14/2022       Most recent echocardiogram 5/9/22   Technically difficult examination. Apical views are off axis secondary to pt   left ribs fracture. Left ventricular systolic function is normal with ejection fraction   estimated at 55%. No regional wall motion abnormalities. There is mild concentric left ventricular hypertrophy. Indeterminate left ventricular filling pressure.    Systolic pulmonary artery pressure (SPAP) is normal estimated at 22 mmHg   (Right atrial pressure of 8 mmHg). IMAGING    Venous duplex BLE 8/15/22  No DVT    XR Chest 8/14/22  Medical Devices: None. Heart and Mediastinum: Cardiomediastinal silhouette is within normal limits. Lungs and Pleura: Bony vasculature is indistinct. There is no focal consolidation. No pneumothorax or pleural effusion. Bones and soft tissues: No acute abnormalities. XR Lumbar spine 8/13/22  Postsurgical changes following T11-L3 posterior lumbar spinal fusion. Interval inferior migration of the L1 interbody spacer with concern for subsidence into the L2 vertebral body. The above laboratory data have been reviewed. The above imaging data have been reviewed. The above medical testing have been reviewed. Body mass index is 25.28 kg/m². Barriers to Discharge: pain, endurance, comorbidities    POST ADMISSION PHYSICIAN EVALUATION  The patient has agreed to being admitted to our comprehensive inpatient rehabilitation facility consisting of at least 180 minutes of therapy a day, 5 out of 7 days a week. The patient/family has a good understanding of our discharge process. The patient has potential to make improvement and is in need of at least two of the following multidisciplinary therapies including but not limited to physical, occupational, respiratory, and speech, nutritional services, wound care, and prosthetics and orthotics. Given the patients complex condition and risk of further medical complications, rehabilitation services cannot be safely provided at a lower level of care such as a skilled nursing facility. I have compared the patients medical and functional status at the time of the preadmission screening and the same on this date, and there are no significant changes.      By signing this document, I acknowledge that I have personally performed a full physical examination on this patient within 24 hours of admission to this inpatient rehabilitation facility and have determined the patient to be able to tolerate the above course of treatment at an intensive level for a reasonable period of time. I will be completing a detailed individualized Plan of Care for this patient by day four of the patients stay based upon the Preadmission Screen, this Post-Admission Evaluation, and the therapy evaluations. Rehabilitation Diagnosis:  Orthopedic, 8.9, Other Orthopedic    Assessment and Plan: Gretta Bacon is a 69year old female with a past medical history significant for chronic respiratory failure on 3 L home O2, ILD, COPD, paroxysmal atrial fibrillation, DM2, HTN, HLD, and thyroid disease who presented to  on 8/11/22 for planned T11-L3 PDFF with L1 corpectomy for L1 burst fracture. She was [facility] to [facility] on 8/16/22 due to functional deficits below her baseline. L1 Burst Fracture  - s/p T11-L3 PDFF with L1 corpectomy  - TLSO when OOB  - pain control  - incision care  - PT, OT    Chronic Respiratory Failure  - on 3 L home O2 at baseline  - wean oxygen for goal SpO2>88%    Hypokalemia  - continue replacement    ILD/COPD  - pifenidone TID  - duo-nebs  - follows with [facility]    pAF  - not been on [facility]  - monitor    DM2  - on januvia and jardiance at home   - SSI  - patient to have her  bring in home meds    Hypothyroidism  - synthroid    GERD  - PPI    Anxiety/Depression  - zoloft, mirtazipine    HLD  - statin     Bowels: Schedule stool softener. Follow bowel movements. Enema or suppository if needed. Bladder: Check PVR x 3. [facility] if PVR > 200ml or if any volume is > 500 ml. Sleep: Trazodone provided prn. Follow up appointments: Ortho Spine, PCP, Jonathan Story Asp, MD 8/17/2022, 11:57 AM

## 2022-08-17 NOTE — CONSULTS
Current Body Weight: 138 lb (62.6 kg), 125.5 % IBW. Weight Source: Not Specified  Current BMI (kg/m2): 25.2  Usual Body Weight: 174 lb (78.9 kg) (bed scale 1/24/22)  % Weight Change (Calculated): -20.7  Weight Adjustment For: No Adjustment                 BMI Categories: Overweight (BMI 25.0-29. 9)    Estimated Daily Nutrient Needs:  Energy Requirements Based On: Kcal/kg (25-30)  Weight Used for Energy Requirements: Current  Energy (kcal/day): 3987-6371 kcal  Weight Used for Protein Requirements: Current (1.2-1.5 g/kg)  Protein (g/day): 74-93 g  Method Used for Fluid Requirements: 1 ml/kcal  Fluid (ml/day): 7679-9931 mL    Nutrition Diagnosis:   Severe malnutrition related to inadequate protein-energy intake as evidenced by poor intake prior to admission, weight loss, severe muscle loss    Nutrition Interventions:   Food and/or Nutrient Delivery: Continue Current Diet, Start Oral Nutrition Supplement  Nutrition Education/Counseling: No recommendation at this time  Coordination of Nutrition Care: Continue to monitor while inpatient, Interdisciplinary Rounds  Plan of Care discussed with: Patient    Goals:     Goals: PO intake 50% or greater, prior to discharge       Nutrition Monitoring and Evaluation:   Behavioral-Environmental Outcomes: None Identified  Food/Nutrient Intake Outcomes: Food and Nutrient Intake, Supplement Intake, Diet Advancement/Tolerance  Physical Signs/Symptoms Outcomes: Biochemical Data, Nutrition Focused Physical Findings, Weight, Meal Time Behavior    Discharge Planning:    Continue current diet, Continue Oral Nutrition Supplement     Rebecca Lorenoz, MS, RD, LD  Contact: [phone]

## 2022-08-17 NOTE — PROGRESS NOTES
Speech Language Pathology  Facility/Department: [address] [place] [address]  Initial Speech/Language/Cognitive Assessment    NAME: Bonnie Valladares  : 1955   MRN: [id number]  ADMISSION DATE: 2022  ADMITTING DIAGNOSIS: has Chest pain; HTN (hypertension); Hyperlipidemia with target LDL less than 130; Hypokalemia; Insomnia; Trochanteric bursitis of both hips; Migraine; Syncope; Acute blood loss anemia; Fatigue; Hypothyroidism; Mild single current episode of major depressive disorder ([address] Anxiety; Pneumonia of both lower lobes due to infectious organism; A-fib ([address] Atypical pneumonia; Acute bronchitis with bronchospasm; Acute on chronic diastolic CHF (congestive heart failure) ([address] Class 2 obesity with body mass index (BMI) of 39.0 to 39.9 in adult; Abnormal chest CT; Acute diastolic heart failure ([address] ILD (interstitial lung disease) ([address] Acute on chronic respiratory failure with hypoxia ([address] Restrictive lung disease; Abnormal CT of the chest; SOB (shortness of breath); Acute cystitis without hematuria; Shortness of breath; Chronic respiratory failure with hypoxia ([address] Cryptogenic organizing pneumonia ([address] Pneumothorax of left lung after biopsy; COPD (chronic obstructive pulmonary disease) ([address] Type 2 diabetes mellitus without complication ([address] Hyponatremia; Numbness and tingling in left hand; Ulnar neuropathy at elbow of left upper extremity; Acute congestive heart failure ([address] Left wrist pain; Left wrist tendinitis; GERD (gastroesophageal reflux disease); Toxic metabolic encephalopathy; VIRGINIE (acute kidney injury) ([address] Lumbar radiculopathy; Acute on chronic respiratory failure ([address] Rib pain on left side; Elevated brain natriuretic peptide (BNP) level; Fracture of multiple ribs of left side; Interstitial lung disease ([address] Hypomagnesemia; Depression; Chronic diastolic congestive heart failure ([address]  Burst fracture of lumbar vertebra ([address] and H/O Spinal surgery on their problem list.  DATE ONSET: Pt admitted to [facility] 08/11/22. Pt admitted to [address] [place] [address] 08/16/2022. Date of Eval: 8/17/2022   Evaluating Therapist: TEDDY Dale    RECENT RESULTS  CT OF HEAD/MRI: None recent     Primary Complaint:   In regards to cognition, pt reports \"sometimes it's shaky. \" Pt also reports moments of confusion at home and not recognizing things. Pt's goals: to go home\"     Pain:  Pain Assessment  Pain Assessment: 0-10  Pain Level: 7  Patient's Stated Pain Goal: 0 - No pain  Pain Location: Back  Pain Orientation: Mid, Lower  Pain Descriptors: Aching  Functional Pain Assessment: Prevents or interferes some active activities and ADLs  Non-Pharmaceutical Pain Intervention(s): Repositioned  Response to Pain Intervention: Pain improved but above pain goal  Side Effects: No reported side effects    Vitals/labs:   Temp: n/a  SpO2: 90%  RR: 20-24  BP: 118/66  HR: 99  O2 device: 3L O2 via NC     Vision/ Hearing  Vision  Vision: Impaired  Vision Exceptions: Wears glasses for reading  Hearing  Hearing: Within functional limits    Assessment:  Cognitive Diagnosis: Mild high level cognitive deficits  Speech Diagnosis: Reduced breath support with phonation     Medical record review/interview: MD H&P not completed at time of eval.     Pt alert and cooperative, agreeable to eval. Pt in bed, lying down most of the time due to back pain. Pt reports living at home with her  and dog. Pt reports daughter manages medications. Pt and  both manage finances and do online grocery shopping. Pt's  does the cooking, cleaning, laundry, and yard work. Pt is not active  - reports she has not driven since she began on O2 (around 4 years ago). Pt is a retired home health aid. Pt enjoys sewing, spending time with family, listening to books. The pt was administered the [address] ([address]) Examination this date to assess cognition.  The pt scored a 26/30 with a score of 27 or greater considered WNL per the SLUMS. This score is judged to be a mild, high level cognitive deficit when considering prior level of indpenedence and high level of function. See pt's scores on each subtest below:    [address] Status (SLUMS) Examination   Section / subtest   Score Comments   Orientation   3/3    Short-term recall   4/5 Immediate recall trial 1 and 2: 5/5  Delayed recall: 4/5 indep; +1 given category cue      Calculations   1/3 Accurate addition, difficulty with substraction     Naming   3/3    Attention: number repetition   1/2 Minor errors   Visuospatial tasks: Clock drawing and shape identification   6/6    Auditory comprehension 8/8     Total score:  26/30       The pt demonstrated difficulty with higher level recall, executive function, critical thinking. Pt demonstrated strengths in orientation, attention, naming, thought org, auditory comprehension. Pt also with reduced breath support during conversation. Pt will benefit from continued speech therapy to promote improvement in these areas and achieve safe and independent return home at [facility] if safe and able. Discussed areas of deficit, goals, POC for ST with pt who is in agreement for participation in therapy. Recommendations:  Recommendations  Requires SLP Intervention: Yes  Patient Education: SLP edu pt re: role of SLP, rationale of cog eval, results of cog eval, goals, POC  Patient Education Response: Verbalizes understanding  Duration of Treatment: 30 min, 5x/week           Plan:   Speech Therapy Prognosis  Prognosis: Good  Individuals consulted  Consulted and agree with results and recommendations: Patient;RN  Safety Devices  Safety Devices in place: Yes  Type of devices: All fall risk precautions in place;Call light within reach; Bed alarm in place; Left in bed    Goals:  Long-term Goals  Timeframe for Long-term Goals: 10 days (08/26/2022)  Goal 1: Pt will improve overall cognitive-linguistic abilities to promote safe and independent return home PLOF  Short-term Goals  Timeframe for Short-term Goals: 7 days (08/23/2022)  Goal 1: Pt will complete graded recall tasks using compensatory strategies with 90% acc given min cues  Goal 2: Pt will complete executive function tasks (e.g. money, time, etc) with 90% acc given min cues  Goal 3: Pt will complete higher level critical thinking tasks with 90% acc given min cues  Goal 4: Pt will complete respiratory retraining exercises to improve overall breath support with speech 10/10 given min cues   Patient/family involved in developing goals and treatment plan: yes     Subjective:   Previous level of function and limitations: lives with . Receives assistance.       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  Leisure & Hobbies: knitting/sewing, being with  and dog  IADL Comments:  assist with IADLs at times does work third shift  Vision  Vision: Impaired  Vision Exceptions: Wears glasses for reading  Hearing  Hearing: Within functional limits           Objective:       Oral Motor   Labial: No impairment  Dentition: Limited;Natural  Oral Hygiene: Clean  Lingual: No impairment  Mandible: No impairment  Gag: No Impairment    Motor Speech  Apraxic Characteristics: None  Dysarthric Characteristics: Phonation/respiration incoordination;Decreased breath support  Overall Impairment Severity: Minimal    Auditory Comprehension  Comprehension: Within Functional Limits    Expression  Primary Mode of Expression: Verbal    Verbal Expression  Verbal Expression: Within functional limits    Written Expression  Dominant Hand: Right  Written Expression:  (did not assess)         Pragmatics/Social Functioning  Pragmatics: Within functional limits    Cognition:      Orientation  Overall Orientation Status: Within Normal Limits  Attention  Attention: Within Functional Limits  Memory  Memory: Exceptions to [facility]  Short-term Memory: Mild  Problem Solving  Problem Solving: Exceptions to [facility]  Verbal Reasoning Skills: Mild  Sequencing: Mild  Executive Function Skills: Mild  Managing Finances:  (to be assess in therapy)  Numeric Reasoning  Numeric Reasoning: Exceptions to [facility]   Calculations: Mild  Money Management:  (to be assessed in therapy)  Time: WFL  Abstract Reasoning  Abstract Reasoning: Within Functional Limits  Safety/Judgment  Safety/Judgment: Exceptions to [facility]      Prognosis:  Speech Therapy Prognosis  Prognosis: Good  Individuals consulted  Consulted and agree with results and recommendations: Patient;RN    Education:  Patient Education: SLP edu pt re: role of SLP, rationale of cog eval, results of cog eval, goals, POC  Patient Education Response: Verbalizes understanding  Safety Devices in place: Yes  Type of devices: All fall risk precautions in place;Call light within reach; Bed alarm in place; Left in bed    Therapy Time:   Individual   Time In 0800   Time Out 0845   Minutes [facility]  Speech Language Pathologist

## 2022-08-17 NOTE — CARE COORDINATION
Case Management ARU Admission [facility],[address] [facility]     Rehab Dx/Hx: H/O Spinal surgery [Z16.203]   :1955  RXH:[id number]  Date of Admit: 2022  Room #: 0162/0162-01       Objective:  met with the patient to complete initial assessment and review the role of Case Management while on the ARU. Patient educated on team conferences. Discussed family training with the patient/family on how it is encouraged on the unit. Order for \"discharge planning\" has been addressed. Family Present: no   Primary : Curtis Cabello (Spouse)   [phone]    Health Care Decision Maker:   Primary Decision Maker: Curtis Cabello - Spouse - [phone]    Secondary Decision Maker: Jayna Clement - Child - [phone]   Current PCP:  Rivas Leyva MD       Admit date:  2022   Insurance: P:[employer] [address] [place] [address] CHOICE-PPO MEDICARE  S:MEDICAID [place]   Precert required for SNF:  [] No       [x]yes   3 Night stay required: [x] No       []   Admitting diagnosis: H/O Spinal surgery [Z98.890]       Current home situation: Independent prior level of function. Lives with spouse in a one level home with ramp entrance. DME at home: [x] Walker>rollator     [] [employer]       [] RTS        [] BSC      [] Shower Chair        [] O2       [] HHN     [] CPAP       [] BiPap      [] Hospital Bed       [x] W/C>manual        [] Other:    Medical concerns requiring training: no   Medication concerns:  Require financial assistance with meds? [x] No       [] If yes, please explain:   [x] No       []       Services prior to admission: [facility] for home O2 and [facility]   Preference for HHC or OP Therapy: [] Home health       [] Outpatient       [x] No preference   Patient's goal(s): Return home   Working or volunteering PTA?  yes       Transportation needs: Spouse can provide   Has lack of transportation kept you from medical appointments, meetings, work, or ADL's? [] Yes, it has kept me from medical appointments or from getting my meds  [] Yes, It has kept me from non-medical meetings, appointments, work, or getting things I need  [x] No  [] Pt unable to respond  [] Pt declines to respond     How often do you need to have someone help you when you read instructions, pamphlets, or other written material from your doctor or pharmacy? [x] Never                             [] Always  [] Rarely                            [] Patient unable to respond  [] Sometimes                     [] Pt declines to respond  [] Often   Active with: [] Senior services        [] [place] on aging         [] Other:         Dialysis Facility (if applicable) Name:  Address:  Dialysis Schedule:  Phone:  Fax:       Support system at home and in the community: family   Caregiver physical ability: [x] Cue patient for safety  [x] Physical lift/lower: [x] Light [] Moderate [] Heavy  [x] Marianna Boyers / Clean  [x] Transport   Who will be the one to contact for family training: Rony (spouse)   24 hour care on discharge?: Yes, sup/assist   What level does the patient need to be at to return home:  PLOF   Discharge plan:  Home with home health therapies   Barriers to discharge:        Ethnicity: Are you of , /a, or Scottish origin?   [x] No, not of , /a, or Scottish origin  [] Yes, [place], [facility], Chicano/a  [] Yes, [facility]  [] Yes, [place]  [] Yes, another , , or Scottish origin  [] Pt unable to respond  [] Pt declines to respond     Race: [x] White                                                [] [place]              [] [facility]  [] Myrna Cornejo or [place] American                [] [place]          [] [place] or [place]  [] [address] or [place] Native     [] [place]             [] Wallisian  []  [place] [facility]                                      [] [place]      [] Other [place]  [] [facility]                                             [] Other        [] Pt unable to respond                      [] Pt declines to respond                  [] None of the above   Preferred Language: English     ECO on chart for MD Signature.      Signature: Diaz Kong RN

## 2022-08-17 NOTE — PROGRESS NOTES
Physical Therapy  Facility/Department: [facility] ARU  Rehabilitation Physical Therapy Initial Assessment    NAME: Panchito Hatfield  : 1955 (69 y.o.)  MRN: [id number]  CODE STATUS: Full Code    Date of Service: 22      Past Medical History:   Diagnosis Date    A-fib [facility])     Anxiety     Cryptogenic organizing pneumonia ([address]     Depression     GERD (gastroesophageal reflux disease)     Hyperlipidemia     On home oxygen therapy     uses O2 3L prn    Rash     Thyroid disease     hypothyroidism     Past Surgical History:   Procedure Laterality Date    ARM SURGERY Left 3/2/2020    LEFT ULNAR NERVE DECOMPRESSION AT THE ELBOW performed by Frederick Cary MD at [facility] N/A 2019    EBUS WF W/ANES.  performed by Jana Porter MD at [place] [address]  2019    BRONCHOSCOPY/TRANSBRONCHIAL NEEDLE BIOPSY performed by Jana Porter MD at [place] [address]  2019    BRONCHOSCOPY/TRANSBRONCHIAL LUNG BIOPSY performed by Jana Porter MD at [place] [address]  2019    BRONCHOSCOPY ALVEOLAR LAVAGE performed by Jana Porter MD at [place] [address]  07/10/2019    BRONCHOSCOPY N/A 7/10/2019    BRONCHOSCOPY ALVEOLAR LAVAGE performed by Shameka Lombardi MD at [place] [address] Bilateral 2019    BRONCHOSCOPY N/A 2019    BRONCHOSCOPY ALVEOLAR LAVAGE performed by Whit Jordan MD at [place] [address] N/A 2019    BRONCHOSCOPY ALVEOLAR LAVAGE performed by Corinne Huguenin, MD at [place] [address], TOTAL ABDOMINAL (CERVIX REMOVED)      partial       Chart Reviewed: Yes  Patient assessed for rehabilitation services?: Yes  Additional Pertinent Hx: anxiety, depression, a-fib, GERD  Family / Caregiver Present: No  Referring Practitioner: Ally Torres MD  Referral Date : 22  Diagnosis: L1 burst fx s/p T11-L3 PDFF with L1 corpectomy, 8/11  General Comment  Comments: RN notified of pt's pain and administers pain medication at start of session    Restrictions:  Restrictions/Precautions: General Precautions; Fall Risk;Up as Tolerated  Position Activity Restriction  Spinal Precautions: No Bending; No Twisting; No Lifting  Spinal Precautions: TLSO when OOB, can jamila EOB; no lifting >10 lbs  Other position/activity restrictions: 3L O2, TLSO     SUBJECTIVE  Subjective: Pt in L sidelying in bed upon arrival and agreeable to PT evaluation this afternoon however reports increased pain in back (rated 8/10)  Pain: Pt rates back pain 5/10 this afternoon; RN administers pain medication at start of PT session         Prior Level of Function:  Social/Functional History  Lives With: Spouse  Type of Home: House  Home Layout: One level, Performs ADL's on one level  Home Access: Ramped entrance  Bathroom Shower/Tub: Tub/Shower unit, Shower chair with back, Walk-in shower  Bathroom Toilet: Standard  Bathroom Equipment: Grab bars in shower, Grab bars around toilet, Hand-held shower  Bathroom Accessibility: Accessible, Wheelchair accessible, Walker accessible  Home Equipment: Rafat Lose  Has the patient had two or more falls in the past year or any fall with injury in the past year?: Yes  Receives Help From: Family  ADL Assistance: Independent  Homemaking Assistance: Independent  Homemaking Responsibilities: Yes  Meal Prep Responsibility: No  Laundry Responsibility: No  Cleaning Responsibility: No  Bill Paying/Finance Responsibility: Primary  Shopping Responsibility: No  Health Care Management: No  Ambulation Assistance: Independent (using [id number]; household distances up to 50ft only.  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 OBJECTIVE  Vision  Vision: Impaired  Vision Exceptions: Wears glasses for reading    Hearing  Hearing: Within functional limits      Strength  Strength RLE  Strength RLE: Exception  Comment: Difficult to formally assess d/t increased pain levels, however at least 3+/5 throughout as seen with AROM and transfers. Pt does report RLE feels weaker than LLE- will continue to assess  Strength LLE  Strength LLE: Exception  Comment: Difficult to formally assess d/t increased pain levels, however at least 3+/5 throughout as seen with AROM and transfers      Sensation  Overall Sensation Status: WNL (pt reports intermittent numbness in L hand)    Functional Mobility  Bed mobility  Rolling to Left: Minimal assistance  Rolling to Right: Minimal assistance  Supine to Sit: Minimal assistance  Sit to Supine: Contact guard assistance  Bed Mobility Comments: bed mobility completed with HOB flat, no rails, with increased time and min cues for log roll technique  Transfers  Sit to Stand: Minimal Assistance (up to RW with increased time)  Stand to sit: Minimal Assistance  Bed to Chair: Minimal assistance; Moderate assistance (using RW via stand pivot transfer)  Car Transfer: Unable to assess (d/t high pain levels, nausea, and reports of lightheadedness/dizziness when seated)  Balance  Sitting - Static: Fair  Sitting - Dynamic: Fair  Standing - Static: Fair  Comments: Pt sits EOB with SBA for balance and BUE support.  Standing balance at RW with min A while pulling down pants; standing tolerance limited to ~30sec d/t lightheadedness and nausea    Environmental Mobility  Ambulation  Comments: Unable to assess ambulation this session d/t high pain levels, nausea, and reports of lightheadedness/dizziness when seated/upright  Stairs/Curb  Stairs?: No (N/A- pt does not complete stairs/steps at baseline)         ASSESSMENT  Vitals  Heart Rate: 99  Heart Rate Source: Monitor  BP: 118/66  BP Location: Right upper arm  MAP (Calculated): 83.33  SpO2: 95 %  O2 Device: Nasal cannula (3L)    Activity Tolerance  Activity Tolerance: Patient limited by pain  Activity Tolerance Comments: Pt limited by pain, nausea, and dizziness/lightheadedness when upright (although BP stable in 150s/70s when seated EOB)    Assessment  Assessment: Pt is a 69year old female admitted s/p T11-L3 PDFF on 8/11/22, resulting in above impairments. Pt able to complete bed mobility with min A and functional transfers with min-mod A using RW. Unable to assess ambulation, car transfer, or w/c mobility d/t nausea and dizziness/lightheadedness when seated EOB. Pt is limited this session by significant back pain, nausea, and lightheadedness/dizziness when sitting or standing. She is currently functioning below baseline as she was mod I using [id number] for household distances (w/c for community mobility) prior to admission. She has a ramped entrance to home and does not complete stairs at baseline. Will continue to progress and assess functional mobility as appropriate. Treatment Diagnosis: impaired mobility and strength  Therapy Prognosis: Good  Decision Making: Medium Complexity  Discharge Recommendations: 24 hour supervision or assist;Home with Home health PT  PT D/C Equipment  Other: TBD pending progress- pt has 4WW and w/c at home  PT Equipment Recommendations  Other: TBD pending progress- pt has 4WW and w/c at home    [address] [place],    Pt is a 69year old female admitted s/p T11-L3 PDFF on 8/11/22, resulting in above impairments. Pt able to complete bed mobility with min A and functional transfers with min-mod A using RW. Unable to assess ambulation, car transfer, or w/c mobility d/t nausea and dizziness/lightheadedness when seated EOB.  Pt is limited this notified  Restraints  Restraints Initially in Place: No    EDUCATION  Education  Education Given To: Patient  Education Provided: Role of Therapy;Cognition;Plan of Care;Family Education;Mobility Training;Equipment;Precautions;Transfer Training; Safety; Energy Conservation; Fall Prevention Strategies;DME/Home Modifications  Education Provided Comments: role of PT, PT POC, IPR schedule, safety strategies, goals, deficits, precautions, pain management, bed mobility, transfers, progression of activity  Education Method: Demonstration;Verbal  Barriers to Learning: None  Education Outcome: Verbalized understanding;Demonstrated understanding;Continued education needed    ELOS:   Plan weeks: 10 days      Therapy Time   Individual Concurrent Group Co-treatment   Time In 1300         Time Out 1400         Minutes 60           Timed Code Treatment Minutes: 45 Minutes (15 min eval)       Oleg Woody PT, DPT 08/17/22 at 3:06 PM

## 2022-08-17 NOTE — PROGRESS NOTES
08/17/22 0203   RT Protocol   History Pulmonary Disease 0   Respiratory pattern 0   Breath sounds 0   Cough 0   Indications for Bronchodilator Therapy On home bronchodilators   Bronchodilator Assessment Score 0

## 2022-08-18 PROCEDURE — 97129 THER IVNTJ 1ST 15 MIN: CPT

## 2022-08-18 PROCEDURE — 6370000000 HC RX 637 (ALT 250 FOR IP): Performed by: STUDENT IN AN ORGANIZED HEALTH CARE EDUCATION/TRAINING PROGRAM

## 2022-08-18 PROCEDURE — 97112 NEUROMUSCULAR REEDUCATION: CPT

## 2022-08-18 PROCEDURE — 97530 THERAPEUTIC ACTIVITIES: CPT

## 2022-08-18 PROCEDURE — 97130 THER IVNTJ EA ADDL 15 MIN: CPT

## 2022-08-18 PROCEDURE — 94640 AIRWAY INHALATION TREATMENT: CPT

## 2022-08-18 PROCEDURE — 1280000000 HC REHAB R&B

## 2022-08-18 PROCEDURE — 6360000002 HC RX W HCPCS: Performed by: STUDENT IN AN ORGANIZED HEALTH CARE EDUCATION/TRAINING PROGRAM

## 2022-08-18 PROCEDURE — 2700000000 HC OXYGEN THERAPY PER DAY

## 2022-08-18 PROCEDURE — 94761 N-INVAS EAR/PLS OXIMETRY MLT: CPT

## 2022-08-18 PROCEDURE — 97116 GAIT TRAINING THERAPY: CPT

## 2022-08-18 PROCEDURE — 97535 SELF CARE MNGMENT TRAINING: CPT

## 2022-08-18 PROCEDURE — 97110 THERAPEUTIC EXERCISES: CPT

## 2022-08-18 RX ORDER — OXYCODONE HYDROCHLORIDE AND ACETAMINOPHEN 5; 325 MG/1; MG/1
2 TABLET ORAL EVERY 4 HOURS PRN
Status: DISCONTINUED | OUTPATIENT
Start: 2022-08-18 | End: 2022-09-02 | Stop reason: HOSPADM

## 2022-08-18 RX ORDER — IPRATROPIUM BROMIDE AND ALBUTEROL SULFATE 2.5; .5 MG/3ML; MG/3ML
1 SOLUTION RESPIRATORY (INHALATION) 3 TIMES DAILY
Status: DISCONTINUED | OUTPATIENT
Start: 2022-08-18 | End: 2022-09-02 | Stop reason: HOSPADM

## 2022-08-18 RX ORDER — LIDOCAINE 4 G/G
1 PATCH TOPICAL DAILY
Status: DISCONTINUED | OUTPATIENT
Start: 2022-08-18 | End: 2022-09-02 | Stop reason: HOSPADM

## 2022-08-18 RX ORDER — ONDANSETRON 4 MG/1
4 TABLET, ORALLY DISINTEGRATING ORAL EVERY 4 HOURS PRN
Status: DISCONTINUED | OUTPATIENT
Start: 2022-08-18 | End: 2022-09-02 | Stop reason: HOSPADM

## 2022-08-18 RX ORDER — OXYCODONE HYDROCHLORIDE AND ACETAMINOPHEN 5; 325 MG/1; MG/1
1 TABLET ORAL EVERY 4 HOURS PRN
Status: DISCONTINUED | OUTPATIENT
Start: 2022-08-18 | End: 2022-09-02 | Stop reason: HOSPADM

## 2022-08-18 RX ORDER — IPRATROPIUM BROMIDE AND ALBUTEROL SULFATE 2.5; .5 MG/3ML; MG/3ML
1 SOLUTION RESPIRATORY (INHALATION) 3 TIMES DAILY
Status: DISCONTINUED | OUTPATIENT
Start: 2022-08-18 | End: 2022-08-18

## 2022-08-18 RX ADMIN — CETIRIZINE HYDROCHLORIDE 10 MG: 10 TABLET, FILM COATED ORAL at 09:07

## 2022-08-18 RX ADMIN — NADOLOL 20 MG: 20 TABLET ORAL at 13:02

## 2022-08-18 RX ADMIN — HEPARIN SODIUM 5000 UNITS: 5000 INJECTION INTRAVENOUS; SUBCUTANEOUS at 14:11

## 2022-08-18 RX ADMIN — OXYCODONE AND ACETAMINOPHEN 2 TABLET: 5; 325 TABLET ORAL at 20:01

## 2022-08-18 RX ADMIN — HEPARIN SODIUM 5000 UNITS: 5000 INJECTION INTRAVENOUS; SUBCUTANEOUS at 20:01

## 2022-08-18 RX ADMIN — METHOCARBAMOL TABLETS 750 MG: 750 TABLET, COATED ORAL at 09:07

## 2022-08-18 RX ADMIN — LEVOTHYROXINE SODIUM 125 MCG: 0.12 TABLET ORAL at 06:23

## 2022-08-18 RX ADMIN — PANTOPRAZOLE SODIUM 40 MG: 40 TABLET, DELAYED RELEASE ORAL at 06:23

## 2022-08-18 RX ADMIN — ATORVASTATIN CALCIUM 40 MG: 40 TABLET, FILM COATED ORAL at 09:07

## 2022-08-18 RX ADMIN — OXYCODONE AND ACETAMINOPHEN 2 TABLET: 5; 325 TABLET ORAL at 13:01

## 2022-08-18 RX ADMIN — POTASSIUM CHLORIDE 20 MEQ: 1500 TABLET, EXTENDED RELEASE ORAL at 09:07

## 2022-08-18 RX ADMIN — METHOCARBAMOL TABLETS 750 MG: 750 TABLET, COATED ORAL at 20:01

## 2022-08-18 RX ADMIN — ONDANSETRON 4 MG: 4 TABLET, ORALLY DISINTEGRATING ORAL at 06:24

## 2022-08-18 RX ADMIN — IPRATROPIUM BROMIDE AND ALBUTEROL SULFATE 1 AMPULE: 2.5; .5 SOLUTION RESPIRATORY (INHALATION) at 21:11

## 2022-08-18 RX ADMIN — OXYCODONE AND ACETAMINOPHEN 1 TABLET: 5; 325 TABLET ORAL at 02:25

## 2022-08-18 RX ADMIN — ONDANSETRON 4 MG: 4 TABLET, ORALLY DISINTEGRATING ORAL at 12:47

## 2022-08-18 RX ADMIN — HEPARIN SODIUM 5000 UNITS: 5000 INJECTION INTRAVENOUS; SUBCUTANEOUS at 06:24

## 2022-08-18 RX ADMIN — IPRATROPIUM BROMIDE AND ALBUTEROL SULFATE 1 AMPULE: .5; 2.5 SOLUTION RESPIRATORY (INHALATION) at 12:10

## 2022-08-18 RX ADMIN — MIRTAZAPINE 30 MG: 15 TABLET, FILM COATED ORAL at 20:01

## 2022-08-18 RX ADMIN — METHOCARBAMOL TABLETS 750 MG: 750 TABLET, COATED ORAL at 14:12

## 2022-08-18 RX ADMIN — ONDANSETRON 4 MG: 4 TABLET, ORALLY DISINTEGRATING ORAL at 20:01

## 2022-08-18 ASSESSMENT — PAIN DESCRIPTION - DESCRIPTORS
DESCRIPTORS: ACHING
DESCRIPTORS: ACHING;THROBBING

## 2022-08-18 ASSESSMENT — PAIN DESCRIPTION - LOCATION
LOCATION: BACK;GROIN
LOCATION: GROIN;BACK

## 2022-08-18 ASSESSMENT — PAIN SCALES - GENERAL
PAINLEVEL_OUTOF10: 4
PAINLEVEL_OUTOF10: 7
PAINLEVEL_OUTOF10: 6
PAINLEVEL_OUTOF10: 7
PAINLEVEL_OUTOF10: 5
PAINLEVEL_OUTOF10: 7

## 2022-08-18 ASSESSMENT — PAIN DESCRIPTION - ORIENTATION
ORIENTATION: RIGHT
ORIENTATION: RIGHT;MID
ORIENTATION: MID;LOWER;RIGHT

## 2022-08-18 NOTE — PROGRESS NOTES
Occupational Therapy  Facility/Department: [facility]  Rehabilitation Occupational Therapy Daily Treatment Note    Date: 22  Patient Name: Jayy Reynoso       Room: 2996/6488-23  MRN: [id number]  Account: [de-identified]   : 1955  (73 y.o.) Gender: female                    Past Medical History:  has a past medical history of A-fib ([facility] [address] Anxiety, Cryptogenic organizing pneumonia ([facility] [address] Depression, GERD (gastroesophageal reflux disease), Hyperlipidemia, On home oxygen therapy, Rash, and Thyroid disease. Past Surgical History:   has a past surgical history that includes Cholecystectomy; bronchoscopy (N/A, 2019); bronchoscopy (2019); bronchoscopy (2019); bronchoscopy (2019); bronchoscopy (07/10/2019); bronchoscopy (N/A, 7/10/2019); Hysterectomy, total abdominal; bronchoscopy (Bilateral, 2019); bronchoscopy (N/A, 2019); bronchoscopy (N/A, 2019); and Arm Surgery (Left, 3/2/2020). Restrictions  Restrictions/Precautions: General Precautions; Fall Risk;Up as Tolerated  Spinal Precautions: TLSO when OOB, can jamila EOB; no lifting >10 lbs  Other position/activity restrictions: 3L O2, TLSO    Subjective  Subjective: Pt in WC, seen with PT for cotx to address functional mobility/standing balance and tolerance. Reports 6/10 pain in R groin. Then agreeable to individual OT session to address functional transfers and ADLs. Restrictions/Precautions: General Precautions; Fall Risk;Up as Tolerated             Objective     Cognition  Overall Cognitive Status: Exceptions  Arousal/Alertness: Appropriate responses to stimuli  Following Commands: Follows all commands without difficulty; Follows multistep commands with repitition  Attention Span: Attends with cues to redirect  Memory: Appears intact  Safety Judgement: Decreased awareness of need for safety  Problem Solving: Assistance required to identify errors made;Assistance required to correct errors made;Assistance required to implement solutions;Assistance required to generate solutions  Insights: Decreased awareness of deficits  Initiation: Requires cues for some  Sequencing: Requires cues for some  Orientation  Overall Orientation Status: Within Normal Limits  Orientation Level: Oriented X4   Perception  Overall Perceptual Status: Impaired  Unilateral Attention: Appears intact  Initiation: Cues to initiate tasks  Motor Planning: Appears intact  Perseveration: Not present     ADL  Feeding  Assistance Level: Increased time to complete;Stand by assist;Set-up  Grooming/Oral Hygiene  Assistance Level: Minimal assistance;Verbal cues; Increased time to complete;Set-up  Skilled Clinical Factors: Valeria to stand 3x1 minute sinkside, sitting to complete grooming/oral care due to fatigue and pain setup/SPV in [facility]  Upper Extremity Dressing  Assistance Level: Increased time to complete;Minimal assistance;Verbal cues  Skilled Clinical Factors: Valeria to jamila/doff brace and manage O2/lines  Lower Extremity Dressing  Assistance Level: Increased time to complete;Maximum assistance;Verbal cues  Skilled Clinical Factors: able to manage brief down, assist with threading/dethreading brief and pants and management up with modA for balance using RW vs grab bar  Toileting  Assistance Level: Increased time to complete;Verbal cues; Maximum assistance  Skilled Clinical Factors: assist with all tasks in stance with RW, modA for balance  Toilet Transfers  Technique: Stand step; To the left; To the right (grab bar vs RW)  Equipment: Beside commode (over toilet)  Additional Factors: Increased time to complete;Cues for hand placement; With handrails; Set-up; Verbal cues  Assistance Level: Moderate assistance;Verbal cues; Increased time to complete          Functional Mobility  Device: Rolling walker  Activity: Transport items; Retrieve items (in gym and hallway, in // bars)  Assistance Level: Minimal assistance; Requires x 2 assistance  Skilled Clinical Factors: Valeria x2 sit <> stand and mobility x10 ft in // bars, x5 ft and x12 ft with RW and WC follow; limited by weakness/pain and anxiety needing encouragement to remain standing  Bed Mobility  Overall Assistance Level: Minimal Assistance  Additional Factors: With handrails; Head of bed flat; Increased time to complete;Verbal cues  Bridging  Assistance Level: Minimal assistance  Roll Left  Assistance Level: Minimal assistance  Roll Right  Assistance Level: Minimal assistance  Sit to Supine  Assistance Level: Minimal assistance  Supine to Sit  Assistance Level: Minimal assistance  Scooting  Assistance Level: Minimal assistance  Transfers  Surface: To bed; To chair with arms; Bedside commode;From chair with arms; Wheelchair  Additional Factors: Set-up; Verbal cues; Hand placement cues; Increased time to complete; With handrails  Device: Walker  Sit to Stand  Assistance Level: Moderate assistance;Minimal assistance; Requires x 2 assistance  Skilled Clinical Factors: Valeria x2 to modA x1 with mod cues for hand placement and mechanics  Stand to Sit  Assistance Level: Moderate assistance;Minimal assistance; Requires x 2 assistance  Skilled Clinical Factors: Valeria x2 to modA x1 with mod cues for hand placement and mechanics  Bed To/From Chair  Technique: Stand step  Assistance Level: Moderate assistance; Requires x 2 assistance;Minimal assistance  Skilled Clinical Factors: Valeria x2 to modA x1 with mod cues for hand placement and mechanics  Stand Pivot  Assistance Level: Minimal assistance; Moderate assistance; Requires x 2 assistance  Skilled Clinical Factors: Valeria x2 to modA x1 with mod cues for hand placement and mechanics         Assessment  Assessment  Assessment: Pt conitnues to be limited by pain, fatigue, and tolerance to standing but pleasant and motivated. Pt grossly min-modA x2 up to maxA x1 for all sit <> stand transfers with increased time, cues for hand placement and prolonged trials.   Pt was able to ambulate up to 12 ft this date but does need Valeria x2 with WC follow and encouragement to remain standing. For individual session, grossly min-modA x1 for sit <> stand to counter but needing to sit to complete grooming and dressing 2/2 fatigue. Pt was maxA x1-2 for sit <> stand from RW and then to steady due to fatigue and pain. Cont OT POC. Second session: Pt in bed, agreeable with encouragement to get to recliner. Educated on importance of OOB activities and sitting in recliner to increase breathing/endurance and strength for transfers and OOB activities. Pt was able to complete supine to sit SBA, scooting to EOB CGA, Steff for donning brace. Sit <> stand from EOB RW Steff, modA stand step to recliner with 1 standing rest break d/t \"spasms\" but able to complete ~8 ft and fair tolerance. Pt was able to complete BUE ex 1# free weight 1 UE at a time x15 reps: chest press, circles forwards/backwards, internal/external rotation, wrist flexion/extension, supination/pronation, elbow flexion/extension, shoulder flexion with elbow extension. Rest breaks PRN but O2 >90% throughout with PLB strategies. Cont OT POC. Activity Tolerance: Patient limited by pain; Patient tolerated treatment well;Patient limited by fatigue  Discharge Recommendations: 24 hour supervision or assist;Home with assist PRN;Home with nursing aide;Home with Home health OT;S Level 3  OT Equipment Recommendations  Equipment Needed: Yes  Other: TBD  Safety Devices  Safety Devices in place: Yes  Type of devices: Left in bed;Call light within reach; Bed alarm in place; Patient at risk for falls; All fall risk precautions in place;Gait belt;Nurse notified    Patient Education  Education  Education Given To: Patient; Family  Education Provided: Role of Therapy; ADL Function;Cognition; Family Education;IADL Function;Plan of Care;Home Exercise Program;Mobility Training;Equipment;DME/Home Modifications;Transfer Training;Precautions; Safety; Energy Conservation; Fall Prevention Strategies  Education Method: Verbal;Demonstration; Teach Back  Barriers to Learning: Cognition  Education Outcome: Verbalized understanding;Demonstrated understanding;Continued education needed    Plan  Plan  Times per Week: 5 out of 7 days  Times per Day: Daily  Plan Weeks: 10 days  Current Treatment Recommendations: Strengthening;ROM;Balance training;Functional mobility training; Endurance training;Gait training;Neuromuscular re-education; Safety education & training;Pain management;Patient/Caregiver education & training;Equipment evaluation, education, & procurement; Modalities; Positioning;Self-Care / ADL; Home management training;Cognitive/Perceptual training;Coordination training    Goals  Patient Goals   Patient goals :  \"To go home and be out of pain\"  Short Term Goals  Time Frame for Short term goals: 5 days (8/21/22)  Short Term Goal 1: Pt will complete standing task x3 minutes SBA with LRAD  Short Term Goal 2: Pt will complete toileting/transfer CGA with LRAD and DME PRN  Short Term Goal 3: Pt will complete LE dressing Valeria using AE and LRAD PRN  Long Term Goals  Time Frame for Long term goals : 10 days (8/26/22)  Long Term Goal 1: Pt will complete functional mobility in room/bathroom Rachael with LRAD for ADL management  Long Term Goal 2: Pt will complete IADL task standing vs sitting Rachael with LRAD  Long Term Goal 3: Pt will complete UE/LE bathing/dressing including donning/doffing brace (patient vs family education teachback) setup vs Rachael sitting vs standing with AE vs LRAD  Long Term Goal 4: Pt will complete toileting/transfer with LRAD and DME PRN aRchael      Therapy Time   Individual Concurrent Group Co-treatment   Time In 1000     0930   Time Out 1030     1000   Minutes 30     30   Timed Code Treatment Minutes: 60 Minutes     Second Session Therapy Time:   Individual Concurrent Group Co-treatment   Time In 1430        Time Out 1500        Minutes 30          Timed Code Treatment Minutes:  30 Minutes    Total Treatment Minutes:  90 Minutes    Tavares Moreira, OTR/L

## 2022-08-18 NOTE — RT PROTOCOL NOTE
increased work of breathing using Per Protocol order mode. 4-6 - enter or revise RT Bronchodilator order(s) to two equivalent RT bronchodilator orders with one order with BID Frequency and one order with Frequency of every 4 hours PRN wheezing or increased work of breathing using Per Protocol order mode. 7-10 - enter or revise RT Bronchodilator order(s) to two equivalent RT bronchodilator orders with one order with TID Frequency and one order with Frequency of every 4 hours PRN wheezing or increased work of breathing using Per Protocol order mode. 11-13 - enter or revise RT Bronchodilator order(s) to one equivalent RT bronchodilator order with QID Frequency and an Albuterol order with Frequency of every 4 hours PRN wheezing or increased work of breathing using Per Protocol order mode. Greater than 13 - enter or revise RT Bronchodilator order(s) to one equivalent RT bronchodilator order with every 4 hours Frequency and an Albuterol order with Frequency of every 2 hours PRN wheezing or increased work of breathing using Per Protocol order mode. RT to enter RT Home Evaluation for COPD & MDI Assessment order using Per Protocol order mode.     Electronically signed by Kevin Ordoñez on 8/18/2022 at 5:21 PM

## 2022-08-18 NOTE — PROGRESS NOTES
Physical Therapy  Facility/Department: [address] [place] [address]  Rehabilitation Physical Therapy Treatment Note    NAME: Panchito Hatfield  : 1955 (69 y.o.)  MRN: [id number]  CODE STATUS: Full Code    Date of Service: 22       Restrictions:  Restrictions/Precautions: General Precautions; Fall Risk;Up as Tolerated  Position Activity Restriction  Spinal Precautions: No Bending; No Twisting; No Lifting  Spinal Precautions: TLSO when OOB, can jamila EOB; no lifting >10 lbs  Other position/activity restrictions: 3L O2, TLSO     SUBJECTIVE  Subjective  Subjective: Pt sidelying in bed upon arrival and agreeable to PT session followed by PT/OT cotx session with min encouragement d/t increased pain and nausea  Pain: Pt rates back and R groin pain 8/10 this morning. RN administers pain medication at start of PT session. MD also notified and aware of pt's high pain levels            OBJECTIVE  Cognition  Overall Cognitive Status: Exceptions  Arousal/Alertness: Appropriate responses to stimuli  Following Commands: Follows all commands without difficulty; Follows multistep commands with repitition  Attention Span: Attends with cues to redirect  Memory: Appears intact  Safety Judgement: Decreased awareness of need for safety  Problem Solving: Assistance required to identify errors made;Assistance required to correct errors made;Assistance required to implement solutions;Assistance required to generate solutions  Insights: Decreased awareness of deficits  Initiation: Requires cues for some  Sequencing: Requires cues for some  Orientation  Overall Orientation Status: Within Normal Limits  Orientation Level: Oriented X4    Functional Mobility  Supine to Sit  Assistance Level: Minimal assistance  Skilled Clinical Factors: with HOB elevated, use of rail, and increased time; assist for trunk elevation. No cues required for log roll technique  Scooting  Assistance Level: Minimal assistance; Moderate assistance  Skilled Clinical Factors: to scoot hips to EOB  Transfers  Surface: From bed; Wheelchair  Additional Factors: Verbal cues; Hand placement cues; Increased time to complete  Device: Walker (RW)  Sit to Stand  Assistance Level: Minimal assistance; Requires x 2 assistance  Skilled Clinical Factors: PT session: pt completes 1 rep from EOB up to RW with min A and increased time. PT/OT cotx session: pt completes 2 reps from w/c in // bars and 2 reps from w/c up to RW, each with min A x 2 with increased time and encouragement; increased difficulty standing during cotx session likely d/t increased pain levels (in R groin)  Stand to Sit  Assistance Level: Minimal assistance  Skilled Clinical Factors: with decreased eccentric control and cues for hand placement  Stand Pivot  Assistance Level: Minimal assistance; Moderate assistance  Skilled Clinical Factors: PT session: stand pivot EOB>w/c with min-mod A using RW; cues for upright positioning plus assist to maneuver RW throughout      Environmental Mobility  Ambulation (PT/OT cotx session)  Surface: Level surface  Device: Rolling walker;Parallel Bars  Distance: In // bars: 5ft. Using RW: 5ft + 12ft  Activity: Within Unit  Activity Comments: pt ambulates 1 bout in // bars and 2 bouts using RW, both with close w/c follow and encouragement to sustain ambulation d/t pain  Additional Factors: Verbal cues; Hand placement cues; Increased time to complete  Assistance Level: Minimal assistance; Requires x 2 assistance (plus close w/c follow for safety)  Gait Deviations: Slow mike;Decreased step length bilateral;Decreased weight shift bilateral;Decreased heel strike right;Decreased heel strike left;Narrow base of support; Unsteady gait  Skilled Clinical Factors: distances limited by pain and fatigue. O2 sats 85-88% on 3L after each bout, recovering to 90% after ~1 min seated rest and PLB.  Consistent encouragement to sustain ambulation and increase distance             PT Exercises  Static Sitting Balance Exercises (PT session): Pt sat EOB x 5 mins while taking medications from RN and while finishing breakfast-- grossly SBA for balance with unilateral to BUE support on EOB      ASSESSMENT/PROGRESS TOWARDS GOALS  Vital Signs  Heart Rate: 83  Heart Rate Source: Monitor  BP: (!) 125/59  BP Location: Right upper arm  MAP (Calculated): 81  SpO2: 99 %  O2 Device: Nasal cannula (3L)    Assessment  Assessment: Pt initially seen for PT session this morning focusing on bed mobility and functional transfers. Pt limited by R groin pain and nausea, requiring increased time and encouragement for participation. Pt able to complete bed mobility with min A and functional transfers with min-mod A using RW. Pt then seen for PT/OT cotx session d/t medical complexity, increased pain levels, deconditioning, and current level of assist. Pt participated in gait training/assessment this date, initially in // bars progressing to use of RW. Pt able to complete functional transfers with min A x 2 and ambulate both in // bars and with RW with min A x 2 plus close w/c follow for safety. Only tolerates up to 12ft ambulation d/t pain. Pt significantly limited by pain, deconditioning, and weakness this date. Will continue to progress functional mobility as appropriate. Activity Tolerance: Patient limited by pain; Patient limited by fatigue;Patient limited by endurance  Discharge Recommendations: 24 hour supervision or assist;Home with Home health PT  PT Equipment Recommendations  Other: TBD pending progress- pt has 4WW and w/c at home    Goals  Patient Goals   Patient goals : \"to be able to walk straight\"  Short Term Goals  Time Frame for Short term goals: 5 days- 8/20/22  Short term goal 1: Pt will complete bed mobility with SBA, with min cues for log roll technique  Short term goal 2: Pt will complete functional transfers with CGA consistently, using LRAD  Short term goal 3: Pt will ambulate 25ft with min A using RW  Long Term Goals  Time Frame for Long term goals : 10 days- 8/26/22  Long term goal 1: Pt will complete bed mobility with mod I without cues for log roll technique  Long term goal 2: Pt will complete functional transfers with mod I using LRAD  Long term goal 3: Pt will ambulate 50ft with supervision using LRAD  Long term goal 4: Pt will complete car transfer with supervision using LRAD  Long term goal 5: Pt will verbalize 3/3 spinal precautions without cues and adhere to precautions with mobility without cues    PLAN OF CARE/SAFETY  Plan  Plan: 5-7 times per week  Plan weeks: 10 days  Current Treatment Recommendations: Strengthening;Balance training;Functional mobility training;Transfer training; Endurance training; Wheelchair mobility training;Gait training;Stair training;Equipment evaluation, education, & procurement; Neuromuscular re-education;Home exercise program;Safety education & training;Patient/Caregiver education & training  Safety Devices  Type of Devices:  (pt left seated in w/c in room with OT at end of session)    EDUCATION  Education  Education Given To: Patient  Education Provided: Role of Therapy;Cognition;Plan of Care;Family Education;Mobility Training;Equipment;Precautions;Transfer Training; Safety; Energy Conservation; Fall Prevention Strategies;DME/Home Modifications  Education Provided Comments: role of PT, PT POC, safety strategies, goals, back precautions, pain management, bed mobility, transfers, progression of activity, initiation of gait training  Education Method: Demonstration;Verbal  Barriers to Learning: None  Education Outcome: Verbalized understanding;Demonstrated understanding;Continued education needed        Therapy Time   Individual Concurrent Group Co-treatment   Time In 0900     0930   Time Out 0930     1000   Minutes 30     30     Timed Code Treatment Minutes: 1000 [facility], PT, DPT 08/18/22 at 11:56 AM

## 2022-08-18 NOTE — PROGRESS NOTES
Speech Language Pathology  [facility]: ACUTE REHAB UNIT  SPEECH-LANGUAGE PATHOLOGY      [x] Daily  [] Weekly Care Conference Note  [] Discharge    Kathy Gomez      :1955  FEP:[id number]  Rehab Dx/Hx: H/O Spinal surgery [Z98.890]    Precautions: falls  Home situation: lives with , dtr manages meds, shares finances with ,  does the cooking, cleaning, laundry, yardwork, not an active    ST Dx: [] Aphasia  [] Dysarthria  [] Apraxia   [] Oropharyngeal dysphagia [x] Cognitive Impairment  [] Other:   Date of Admit: 2022  Room #: 0162/0162-01    Current functional status (updated daily):         Pt being seen for : [] Speech/Language Treatment  [] Dysphagia Treatment [x] Cognitive Treatment  [] Other:  Communication: [x]WFL  [] Aphasia  [] Dysarthria  [] Apraxia  [] Pragmatic Impairment [] Non-verbal  [] Hearing Loss  [] Other:   Cognition: [] WFL  [x] Mild  [] Moderate  [] Severe [] Unable to Assess  [] Other:  Memory: [] WFL  [x] Mild  [] Moderate  [] Severe [] Unable to Assess  [] Other:  Behavior: [x] Alert  [x] Cooperative  [x]  Pleasant  [] Confused  [] Agitated  [] Uncooperative  [] Distractible [] Motivated  [] Self-Limiting [] Anxious  [] Other:  Endurance:  [x] Adequate for participation in SLP sessions  [] Reduced overall  [] Lethargic  [] Other:  Safety: [x] No concerns at this time  [] Reduced insight into deficits  []  Reduced safety awareness [] Not following call light procedures  [] Unable to Assess  [] Other:    Current Diet Order:ADULT DIET;  Regular  ADULT ORAL NUTRITION SUPPLEMENT; Breakfast, Lunch, Dinner; Standard High Calorie/High Protein Oral Supplement  Swallowing Precautions: upright for all intake, stay upright for at least 30 mins after intake, small bites/sips, alternate bites/sips, slow rate of intake, general GERD precautions, and general aspiration precautions        Date: 2022      Tx session 1  2342-4905 Tx session 2  All tx needs met in session 1    Total Timed Code Min 30    Total Treatment Minutes 30    Individual Treatment Minutes 30    Group Treatment Minutes 0    Co-Treat Minutes 0    Variance/Reason:  0    Pain 7 lower back and groin, nausea    Pain Intervention [x] RN notified  [] Repositioned  [] Intervention offered and patient declined  [] N/A  [] Other: [] RN notified  [] Repositioned  [] Intervention offered and patient declined  [] N/A  [] Other:   Subjective     Pt alert and oriented, cooperative and agreeable to participate in therapy. Pt seen partially reclined in bed. Objective:  Goals     Dysphagia Goals:  Short-term Goals  Timeframe for Short-term Goals: 5 days; 0821/2022    Goal 1: The patient will tolerate recommended diet with no clinical s/s of aspiration 5/5        Did not target. Breakfast tray present at bedside but pt declined to eat until she received her nausea medications, RN notified. Goal 2: The patient/caregiver will demonstrate understanding of compensatory swallow strategies, for improved swallow safety. Did not target. Cog Goals:  Short-term Goals  Timeframe for Short-term Goals: 7 days (08/23/2022)    Goal 1: Pt will complete graded recall tasks using compensatory strategies with 90% acc given min cues    Indirectly targeted during time word problems:  -pt benefited from repetition of word problems for improved recall and comprehension to complete     Goal 2: Pt will complete executive function tasks (e.g. money, time, etc) with 90% acc given min cues      Time word problems:  -87% acc indep improving to 100% acc given min cues     General money questions:  -ex: does 25 dimes equal 10 quarters?  -100% acc indep      Goal 3: Pt will complete higher level critical thinking tasks with 90% acc given min cues   Did not target.      Goal 4: Pt will complete respiratory retraining exercises to improve overall breath support with speech 10/10 given min cues     Unable to target as pt requested to lay down d/t nausea. Other areas targeted: N/A    Education:   Edu provided re: rationale for tx tasks provided      Safety Devices: [x] Call light within reach  [] Chair alarm activated  [x] Bed alarm activated  [] Other: [] Call light within reach  [] Chair alarm activated  [] Bed alarm activated  [] Other:    Assessment: Pt pleasant and cooperative, agreeable to participate. Pt requested to lay down during tx session d/t nausea, therefore unable to complete respiratory retraining exercises. Pt required min cues for repetition of stimuli for improved recall and comprehension, however fatigue and not feeling presenting as a negative barrier today. Pt completed time concept word problems with 87% acc indep improving to 100% acc given min cues. Pt completed general money questions with 100% acc indep. Continue goals above. Plan: Continue as per plan of care. Additional Information:     Barriers toward progress: Decreased endurance  Discharge recommendations:  [] Home independently  [] Home with assistance []  24 hour supervision  [] ECF [] Other:  Continued Tx Upon Discharge: ? [] Yes [] No [] TBD based on progress while on ARU [] Vital Stim indicated [] Other:   Estimated discharge date: 08/26/22    Interventions used this date:  [] Speech/Language Treatment  [] Instruction in HEP [] Group [] Dysphagia Treatment [x] Cognitive Treatment   [] Other: Total Time Breakdown / Charges    Time in Time out Total Time / units   Cognitive Tx 0830 0900 30 min/ 2 units    Speech Tx -- -- --   Dysphagia Tx -- -- --       Electronically Signed by     Pino Nieto. A CCC-SLP  Speech-Language Pathologist  [id number]

## 2022-08-18 NOTE — PROGRESS NOTES
Layla Chandler  8/18/2022  [id number]    Chief Complaint: H/O Spinal surgery    Subjective:   No acute events overnight. Today Leticia Ennis is seen in her room with therapy present. She reports that oxygen is drying out her nose and would like for air to be humidified. She also reports wanting duo-nebs to be scheduled. She reports right hip pain and would like to try a lidocaine patch. ROS: denies f/c, n/v, cp, sob  Objective:  Patient Vitals for the past 24 hrs:   BP Temp Temp src Pulse Resp SpO2   08/18/22 1210 -- -- -- 93 18 95 %   08/18/22 1146 (!) 125/59 -- -- 83 -- 99 %   08/18/22 1045 -- -- -- 83 -- --   08/18/22 0900 (!) 125/59 97.7 °F (36.5 °C) Oral 85 17 99 %   08/17/22 2000 131/72 98.2 °F (36.8 °C) Oral 78 18 97 %   08/17/22 1623 -- -- -- -- 16 --   08/17/22 1320 118/66 -- -- 99 -- 95 %   08/17/22 1229 -- -- -- -- 20 --     Gen: No distress, pleasant. Older than stated age  [de-identified]: Normocephalic, atraumatic. CV: extremities well perfused  Resp: No respiratory distress. Abd: Soft, nontender   Ext: No edema. Neuro: Alert, oriented, appropriately interactive.      Wt Readings from Last 3 Encounters:   08/16/22 138 lb 3.2 oz (62.7 kg)   08/08/22 132 lb (59.9 kg)   08/06/22 132 lb (59.9 kg)       Laboratory data:   Lab Results   Component Value Date    WBC 8.8 08/17/2022    HGB 9.5 (L) 08/17/2022    HCT 29.5 (L) 08/17/2022    MCV 85.3 08/17/2022     08/17/2022       Lab Results   Component Value Date/Time     08/17/2022 06:34 AM    K 3.8 08/17/2022 06:34 AM     08/17/2022 06:34 AM    CO2 29 08/17/2022 06:34 AM    BUN 8 08/17/2022 06:34 AM    CREATININE <0.5 08/17/2022 06:34 AM    GLUCOSE 93 08/17/2022 06:34 AM    CALCIUM 7.7 08/17/2022 06:34 AM        Therapy progress:  PT  Position Activity Restriction  Spinal Precautions: No Bending, No Twisting, No Lifting  Spinal Precautions: TLSO when OOB, can jamila EOB; no lifting >10 lbs  Other position/activity restrictions: 3L O2, TLSO  Objective     Sit to Stand: Minimal Assistance (up to RW with increased time)  Stand to sit: Minimal Assistance  Bed to Chair: Minimal assistance, Moderate assistance (using RW via stand pivot transfer)     OT  PT Equipment Recommendations  Other: TBD pending progress- pt has 4WW and w/c at home  Toilet - Technique: Stand step, To left, To right (grab bar vs RW)  Equipment Used: Standard toilet  Toilet Transfers Comments: assist with lifting and lowering, would benefit from [facility] over toilet with RW vs grab bar for support  Assessment        SLP                Body mass index is 25.28 kg/m². Assessment and Plan: Jairo Murry is a 69year old female with a past medical history significant for chronic respiratory failure on 3 L home O2, ILD, COPD, paroxysmal atrial fibrillation, DM2, HTN, HLD, and thyroid disease who presented to  on 8/11/22 for planned T11-L3 PDFF with L1 corpectomy for L1 burst fracture. She was admitted to [facility] on 8/16/22 due to functional deficits below her baseline. L1 Burst Fracture  - s/p T11-L3 PDFF with L1 corpectomy  - TLSO when OOB  - pain control  - incision care  - PT, OT     Chronic Respiratory Failure  - on 3 L home O2 at baseline  - wean oxygen for goal SpO2>88%  - humidify air     Hypokalemia  - continue replacement  - monitor intermittently      ILD/COPD  - pifenidone TID  - schedule duo-nebs  - follows with [facility]     pAF  - not been on [facility]  - monitor     DM2  - on januvia and jardiance at home  - monitor glucose, consider SSI if patient noted to be hyperglycemic. Has not been so far  - patient to have her  bring in home meds     Hypothyroidism  - synthroid     GERD  - PPI     Anxiety/Depression  - zoloft, mirtazipine     HLD  - statin      Bowels: Schedule stool softener. Follow bowel movements. Enema or suppository if needed. Bladder: Check PVR x 3. [address] if PVR > 200ml or if any volume is > 500 ml. Sleep: Trazodone provided prn.      Follow up appointments: [facility], PCP, [address] [place] [address] NAVA Gomez MD 8/18/2022, 12:24 PM

## 2022-08-19 ENCOUNTER — APPOINTMENT (OUTPATIENT)
Dept: GENERAL RADIOLOGY | Age: 67
DRG: 559 | End: 2022-08-19
Attending: STUDENT IN AN ORGANIZED HEALTH CARE EDUCATION/TRAINING PROGRAM
Payer: MEDICARE

## 2022-08-19 LAB
ANION GAP SERPL CALCULATED.3IONS-SCNC: 8 MMOL/L (ref 3–16)
BASOPHILS ABSOLUTE: 0 K/UL (ref 0–0.2)
BASOPHILS RELATIVE PERCENT: 0.4 %
BUN BLDV-MCNC: 8 MG/DL (ref 7–20)
CALCIUM SERPL-MCNC: 7.6 MG/DL (ref 8.3–10.6)
CHLORIDE BLD-SCNC: 101 MMOL/L (ref 99–110)
CO2: 29 MMOL/L (ref 21–32)
CREAT SERPL-MCNC: <0.5 MG/DL (ref 0.6–1.2)
EOSINOPHILS ABSOLUTE: 0.6 K/UL (ref 0–0.6)
EOSINOPHILS RELATIVE PERCENT: 7.6 %
GFR AFRICAN AMERICAN: >60
GFR NON-AFRICAN AMERICAN: >60
GLUCOSE BLD-MCNC: 129 MG/DL (ref 70–99)
GLUCOSE BLD-MCNC: 90 MG/DL (ref 70–99)
GLUCOSE BLD-MCNC: 97 MG/DL (ref 70–99)
GLUCOSE BLD-MCNC: 98 MG/DL (ref 70–99)
HCT VFR BLD CALC: 28 % (ref 36–48)
HEMOGLOBIN: 9.2 G/DL (ref 12–16)
LYMPHOCYTES ABSOLUTE: 2.3 K/UL (ref 1–5.1)
LYMPHOCYTES RELATIVE PERCENT: 28.4 %
MAGNESIUM: 1.6 MG/DL (ref 1.8–2.4)
MCH RBC QN AUTO: 27.9 PG (ref 26–34)
MCHC RBC AUTO-ENTMCNC: 32.8 G/DL (ref 31–36)
MCV RBC AUTO: 84.9 FL (ref 80–100)
MONOCYTES ABSOLUTE: 0.6 K/UL (ref 0–1.3)
MONOCYTES RELATIVE PERCENT: 6.9 %
NEUTROPHILS ABSOLUTE: 4.6 K/UL (ref 1.7–7.7)
NEUTROPHILS RELATIVE PERCENT: 56.7 %
PDW BLD-RTO: 16.6 % (ref 12.4–15.4)
PERFORMED ON: ABNORMAL
PERFORMED ON: NORMAL
PERFORMED ON: NORMAL
PLATELET # BLD: 213 K/UL (ref 135–450)
PMV BLD AUTO: 7 FL (ref 5–10.5)
POTASSIUM REFLEX MAGNESIUM: 3.3 MMOL/L (ref 3.5–5.1)
RBC # BLD: 3.3 M/UL (ref 4–5.2)
SODIUM BLD-SCNC: 138 MMOL/L (ref 136–145)
WBC # BLD: 8 K/UL (ref 4–11)

## 2022-08-19 PROCEDURE — 6370000000 HC RX 637 (ALT 250 FOR IP): Performed by: STUDENT IN AN ORGANIZED HEALTH CARE EDUCATION/TRAINING PROGRAM

## 2022-08-19 PROCEDURE — 97530 THERAPEUTIC ACTIVITIES: CPT

## 2022-08-19 PROCEDURE — 74018 RADEX ABDOMEN 1 VIEW: CPT

## 2022-08-19 PROCEDURE — 97130 THER IVNTJ EA ADDL 15 MIN: CPT

## 2022-08-19 PROCEDURE — 36415 COLL VENOUS BLD VENIPUNCTURE: CPT

## 2022-08-19 PROCEDURE — 97116 GAIT TRAINING THERAPY: CPT

## 2022-08-19 PROCEDURE — 80048 BASIC METABOLIC PNL TOTAL CA: CPT

## 2022-08-19 PROCEDURE — 85025 COMPLETE CBC W/AUTO DIFF WBC: CPT

## 2022-08-19 PROCEDURE — 6360000002 HC RX W HCPCS: Performed by: STUDENT IN AN ORGANIZED HEALTH CARE EDUCATION/TRAINING PROGRAM

## 2022-08-19 PROCEDURE — 94761 N-INVAS EAR/PLS OXIMETRY MLT: CPT

## 2022-08-19 PROCEDURE — 94640 AIRWAY INHALATION TREATMENT: CPT

## 2022-08-19 PROCEDURE — 1280000000 HC REHAB R&B

## 2022-08-19 PROCEDURE — 83735 ASSAY OF MAGNESIUM: CPT

## 2022-08-19 PROCEDURE — 2700000000 HC OXYGEN THERAPY PER DAY

## 2022-08-19 PROCEDURE — 97110 THERAPEUTIC EXERCISES: CPT

## 2022-08-19 PROCEDURE — 97535 SELF CARE MNGMENT TRAINING: CPT

## 2022-08-19 PROCEDURE — 97129 THER IVNTJ 1ST 15 MIN: CPT

## 2022-08-19 RX ORDER — POTASSIUM CHLORIDE 20 MEQ/1
20 TABLET, EXTENDED RELEASE ORAL 2 TIMES DAILY
Status: DISCONTINUED | OUTPATIENT
Start: 2022-08-19 | End: 2022-08-22

## 2022-08-19 RX ORDER — POTASSIUM CHLORIDE 20 MEQ/1
20 TABLET, EXTENDED RELEASE ORAL 2 TIMES DAILY
Status: DISCONTINUED | OUTPATIENT
Start: 2022-08-19 | End: 2022-08-19

## 2022-08-19 RX ADMIN — ONDANSETRON 4 MG: 4 TABLET, ORALLY DISINTEGRATING ORAL at 15:19

## 2022-08-19 RX ADMIN — OXYCODONE AND ACETAMINOPHEN 2 TABLET: 5; 325 TABLET ORAL at 00:14

## 2022-08-19 RX ADMIN — ONDANSETRON 4 MG: 4 TABLET, ORALLY DISINTEGRATING ORAL at 00:14

## 2022-08-19 RX ADMIN — ONDANSETRON 4 MG: 4 TABLET, ORALLY DISINTEGRATING ORAL at 04:50

## 2022-08-19 RX ADMIN — HEPARIN SODIUM 5000 UNITS: 5000 INJECTION INTRAVENOUS; SUBCUTANEOUS at 05:22

## 2022-08-19 RX ADMIN — METHOCARBAMOL TABLETS 750 MG: 750 TABLET, COATED ORAL at 09:41

## 2022-08-19 RX ADMIN — MIRTAZAPINE 30 MG: 15 TABLET, FILM COATED ORAL at 20:54

## 2022-08-19 RX ADMIN — POTASSIUM CHLORIDE 20 MEQ: 1500 TABLET, EXTENDED RELEASE ORAL at 20:54

## 2022-08-19 RX ADMIN — ONDANSETRON 4 MG: 4 TABLET, ORALLY DISINTEGRATING ORAL at 09:47

## 2022-08-19 RX ADMIN — IPRATROPIUM BROMIDE AND ALBUTEROL SULFATE 1 AMPULE: 2.5; .5 SOLUTION RESPIRATORY (INHALATION) at 07:42

## 2022-08-19 RX ADMIN — HYDROXYZINE HYDROCHLORIDE 10 MG: 10 TABLET ORAL at 20:54

## 2022-08-19 RX ADMIN — NADOLOL 20 MG: 20 TABLET ORAL at 09:43

## 2022-08-19 RX ADMIN — OXYCODONE AND ACETAMINOPHEN 2 TABLET: 5; 325 TABLET ORAL at 09:41

## 2022-08-19 RX ADMIN — OXYCODONE AND ACETAMINOPHEN 2 TABLET: 5; 325 TABLET ORAL at 20:54

## 2022-08-19 RX ADMIN — POTASSIUM CHLORIDE 20 MEQ: 1500 TABLET, EXTENDED RELEASE ORAL at 09:40

## 2022-08-19 RX ADMIN — OXYCODONE AND ACETAMINOPHEN 2 TABLET: 5; 325 TABLET ORAL at 04:50

## 2022-08-19 RX ADMIN — SERTRALINE HYDROCHLORIDE 200 MG: 50 TABLET ORAL at 09:40

## 2022-08-19 RX ADMIN — CETIRIZINE HYDROCHLORIDE 10 MG: 10 TABLET, FILM COATED ORAL at 09:40

## 2022-08-19 RX ADMIN — HEPARIN SODIUM 5000 UNITS: 5000 INJECTION INTRAVENOUS; SUBCUTANEOUS at 14:30

## 2022-08-19 RX ADMIN — METHOCARBAMOL TABLETS 750 MG: 750 TABLET, COATED ORAL at 20:53

## 2022-08-19 RX ADMIN — IPRATROPIUM BROMIDE AND ALBUTEROL SULFATE 1 AMPULE: 2.5; .5 SOLUTION RESPIRATORY (INHALATION) at 13:49

## 2022-08-19 RX ADMIN — PIRFENIDONE 801 MG: 801 TABLET, FILM COATED ORAL at 08:00

## 2022-08-19 RX ADMIN — ATORVASTATIN CALCIUM 40 MG: 40 TABLET, FILM COATED ORAL at 09:40

## 2022-08-19 RX ADMIN — HEPARIN SODIUM 5000 UNITS: 5000 INJECTION INTRAVENOUS; SUBCUTANEOUS at 20:54

## 2022-08-19 RX ADMIN — PANTOPRAZOLE SODIUM 40 MG: 40 TABLET, DELAYED RELEASE ORAL at 05:22

## 2022-08-19 RX ADMIN — LEVOTHYROXINE SODIUM 125 MCG: 0.12 TABLET ORAL at 05:22

## 2022-08-19 RX ADMIN — SENNOSIDES 8.6 MG: 8.6 TABLET, COATED ORAL at 09:40

## 2022-08-19 RX ADMIN — ONDANSETRON 4 MG: 4 TABLET, ORALLY DISINTEGRATING ORAL at 20:54

## 2022-08-19 ASSESSMENT — PAIN DESCRIPTION - LOCATION
LOCATION: BACK
LOCATION: GROIN
LOCATION: BACK

## 2022-08-19 ASSESSMENT — PAIN SCALES - GENERAL
PAINLEVEL_OUTOF10: 5
PAINLEVEL_OUTOF10: 7
PAINLEVEL_OUTOF10: 8
PAINLEVEL_OUTOF10: 6
PAINLEVEL_OUTOF10: 8
PAINLEVEL_OUTOF10: 2
PAINLEVEL_OUTOF10: 7

## 2022-08-19 ASSESSMENT — PAIN DESCRIPTION - FREQUENCY
FREQUENCY: CONTINUOUS

## 2022-08-19 ASSESSMENT — PAIN DESCRIPTION - PAIN TYPE
TYPE: SURGICAL PAIN

## 2022-08-19 ASSESSMENT — PAIN DESCRIPTION - ONSET
ONSET: ON-GOING
ONSET: AWAKENED FROM SLEEP

## 2022-08-19 ASSESSMENT — PAIN - FUNCTIONAL ASSESSMENT: PAIN_FUNCTIONAL_ASSESSMENT: PREVENTS OR INTERFERES SOME ACTIVE ACTIVITIES AND ADLS

## 2022-08-19 ASSESSMENT — PAIN DESCRIPTION - DESCRIPTORS
DESCRIPTORS: DISCOMFORT;ACHING;CRAMPING
DESCRIPTORS: THROBBING;STABBING
DESCRIPTORS: STABBING
DESCRIPTORS: THROBBING

## 2022-08-19 ASSESSMENT — PAIN DESCRIPTION - ORIENTATION
ORIENTATION: MID;LOWER
ORIENTATION: LOWER
ORIENTATION: LOWER
ORIENTATION: RIGHT

## 2022-08-19 NOTE — PROGRESS NOTES
Speech Language Pathology  [facility]: ACUTE REHAB UNIT  SPEECH-LANGUAGE PATHOLOGY      [x] Daily  [] Weekly Care Conference Note  [] Discharge    Pinky Sue      :1955  BKZ:[id number]  Rehab Dx/Hx: H/O Spinal surgery [Z98.890]    Precautions: falls  Home situation: lives with , dtr manages meds, shares finances with ,  does the cooking, cleaning, laundry, yardwork, not an active    ST Dx: [] Aphasia  [] Dysarthria  [] Apraxia   [] Oropharyngeal dysphagia [x] Cognitive Impairment  [] Other:   Date of Admit: 2022  Room #: 0162/0162-01    Current functional status (updated daily):         Pt being seen for : [] Speech/Language Treatment  [] Dysphagia Treatment [x] Cognitive Treatment  [] Other:  Communication: [x]WFL  [] Aphasia  [] Dysarthria  [] Apraxia  [] Pragmatic Impairment [] Non-verbal  [] Hearing Loss  [] Other:   Cognition: [] WFL  [x] Mild  [] Moderate  [] Severe [] Unable to Assess  [] Other:  Memory: [] WFL  [x] Mild  [] Moderate  [] Severe [] Unable to Assess  [] Other:  Behavior: [x] Alert  [x] Cooperative  [x]  Pleasant  [] Confused  [] Agitated  [] Uncooperative  [] Distractible [] Motivated  [] Self-Limiting [] Anxious  [] Other:  Endurance:  [x] Adequate for participation in SLP sessions  [] Reduced overall  [] Lethargic  [] Other:  Safety: [x] No concerns at this time  [] Reduced insight into deficits  []  Reduced safety awareness [] Not following call light procedures  [] Unable to Assess  [] Other:    Current Diet Order:ADULT DIET;  Regular  ADULT ORAL NUTRITION SUPPLEMENT; Breakfast, Lunch, Dinner; Standard High Calorie/High Protein Oral Supplement  Swallowing Precautions: upright for all intake, stay upright for at least 30 mins after intake, small bites/sips, alternate bites/sips, slow rate of intake, general GERD precautions, and general aspiration precautions        Date: 2022      Tx session 1  1430 - 1500 Tx session 2  All tx needs met in session 1    Total Timed Code Min 30    Total Treatment Minutes 30    Individual Treatment Minutes 30    Group Treatment Minutes 0    Co-Treat Minutes 0    Variance/Reason:  0    Pain No pain if not moving; nauseated     Pain Intervention [x] RN notified  [] Repositioned  [] Intervention offered and patient declined  [] N/A  [] Other: [] RN notified  [] Repositioned  [] Intervention offered and patient declined  [] N/A  [] Other:   Subjective     Pt alert and oriented, cooperative and agreeable to participate in therapy. Pt seen partially reclined in bed. Objective:  Goals     Dysphagia Goals:  Short-term Goals  Timeframe for Short-term Goals: 5 days; 08/21/2022    Goal 1: The patient will tolerate recommended diet with no clinical s/s of aspiration 5/5        Goal not directly targeted - pt declined all PO trials due to feeling nauseated. Goal 2: The patient/caregiver will demonstrate understanding of compensatory swallow strategies, for improved swallow safety. Goal not directly targeted. Cog Goals:  Short-term Goals  Timeframe for Short-term Goals: 7 days (08/23/2022)    Goal 1: Pt will complete graded recall tasks using compensatory strategies with 90% acc given min cues    Mental Manipulation: Word Progression  -pt given 3 words; asked to repeat in the order in which they could occur  -e.g. Sep, May, Nov --> May, Sep, Nov  -pt completed task with 92% acc indep, improved to 100% acc given min cues       Goal 2: Pt will complete executive function tasks (e.g. money, time, etc) with 90% acc given min cues      Math Word Problems  -e.g. you buy a new bottle of ketchup every other month.  How many bottles of ketchup do you purchase each year?  -pt completed task with 90% acc indep, improved to 100% acc given min cues      Goal 3: Pt will complete higher level critical thinking tasks with 90% acc given min cues   Word Deduction Task   -pt given 3 clues that describe an item; asked to name the item being described  -e.g. time, hands, wristband = watch   -pt completed task with 93% acc indep      Goal 4: Pt will complete respiratory retraining exercises to improve overall breath support with speech 10/10 given min cues     Unable to target as pt requested to lay down d/t nausea. Other areas targeted: N/A    Education:   SLP edu pt re: rationale of tx tasks, recall strategies     Safety Devices: [x] Call light within reach  [] Chair alarm activated  [x] Bed alarm activated  [] Other: [] Call light within reach  [] Chair alarm activated  [] Bed alarm activated  [] Other:    Assessment: Pt alert and cooperative, agreeable to tx. Pt's overall participation somewhat limited due to nausea. Pt is agreeable to cog tx since she can stay lying down in bed, but has declined PO trials to target dysphagia and is unable to complete respiratory exercises in the position she is in. Pt did very well with cognitive tasks this date. Pt overall scoring ~90% with all tasks indep, with improvement to 100% acc when given min cues. Continue goals above. Plan: Continue as per plan of care. Additional Information:     Barriers toward progress: Decreased endurance  Discharge recommendations:  [] Home independently  [] Home with assistance []  24 hour supervision  [] ECF [x] Other: See PT/OT notes   Continued Tx Upon Discharge: ? [] Yes [] No [x] TBD based on progress while on ARU [] Vital Stim indicated [] Other:   Estimated discharge date: 08/26/22    Interventions used this date:  [] Speech/Language Treatment  [] Instruction in HEP [] Group [] Dysphagia Treatment [x] Cognitive Treatment   [] Other:       Total Time Breakdown / Charges    Time in Time out Total Time / units   Cognitive Tx 1430 1500 30 min / 2 units    Speech Tx -- -- --   Dysphagia Tx -- -- --       Electronically Signed by     Beto Maher MA, CCC-SLP #[id number]  Speech Language Pathologist

## 2022-08-19 NOTE — PROGRESS NOTES
Comprehensive Nutrition Assessment    Type and Reason for Visit:  Reassess    Nutrition Recommendations/Plan:   Continue general diet   Continue Ensure with meals   Encourage PO and protein intakes for healing and weight maintenance   If PO intakes remain poor, may need to consider alternative means of nutrition if consistent with pt's goals of care. Monitor nutrition adequacy, pertinent labs, bowel habits, wt changes, and clinical progress     Malnutrition Assessment:  Malnutrition Status:  Severe malnutrition (08/17/22 1341)    Context:  Chronic Illness     Findings of the 6 clinical characteristics of malnutrition:  Energy Intake:  75% or less estimated energy requirements for 1 month or longer  Weight Loss:  Greater than 20% over 1 year    Muscle Mass Loss:  Severe muscle mass loss Temples (temporalis), Clavicles (pectoralis & deltoids), Hand (interosseous)    Nutrition Assessment:    Follow up: Pt remains nutritionally compromised AEB poor PO intakes. Family at bedside reports PO intakes better now than at home. Pt mainly consuming soup only. PO intakes of 0-50% recorded in EMR. Denies nausea today, continues to complian of gagging. Pt reports consuming ONS, recorded as 0% in EMR. ONS unopened at bedside at time of visit. Encouraged PO intakes. Will continue to monitor. Nutrition Related Findings:    BM x1 on 8/18. K 3.3. Mg 1.60. Trace BLE edema. Wound Type: Pressure Injury, Stage II, Surgical Incision       Current Nutrition Intake & Therapies:    Average Meal Intake: 0%, 1-25%, 26-50%  Average Supplements Intake: 0% (recorded in EMR, pt reports drinking)  ADULT DIET; Regular  ADULT ORAL NUTRITION SUPPLEMENT; Breakfast, Lunch, Dinner; Standard High Calorie/High Protein Oral Supplement    Anthropometric Measures:  Height: 5' 2\" (157.5 cm)  Ideal Body Weight (IBW): 110 lbs (50 kg)       Current Body Weight: 138 lb (62.6 kg), 125.5 % IBW.  Weight Source: Not Specified  Current BMI (kg/m2): 25.2  Usual Body Weight: 174 lb (78.9 kg) (bed scale 1/24/22)  % Weight Change (Calculated): -20.7  Weight Adjustment For: No Adjustment                 BMI Categories: Overweight (BMI 25.0-29. 9)    Estimated Daily Nutrient Needs:  Energy Requirements Based On: Kcal/kg (25-30)  Weight Used for Energy Requirements: Current  Energy (kcal/day): 5147-8507 kcal  Weight Used for Protein Requirements: Current (1.2-1.5 g/kg)  Protein (g/day): 74-93 g  Method Used for Fluid Requirements: 1 ml/kcal  Fluid (ml/day): 5426-1869 mL    Nutrition Diagnosis:   Severe malnutrition related to inadequate protein-energy intake as evidenced by poor intake prior to admission, weight loss, severe muscle loss    Nutrition Interventions:   Food and/or Nutrient Delivery: Continue Current Diet, Continue Oral Nutrition Supplement  Nutrition Education/Counseling: No recommendation at this time  Coordination of Nutrition Care: Continue to monitor while inpatient, Interdisciplinary Rounds  Plan of Care discussed with: Patient and family    Goals:  Previous Goal Met: No Progress toward Goal(s)  Goals: PO intake 50% or greater, prior to discharge       Nutrition Monitoring and Evaluation:   Behavioral-Environmental Outcomes: None Identified  Food/Nutrient Intake Outcomes: Food and Nutrient Intake, Supplement Intake, Diet Advancement/Tolerance  Physical Signs/Symptoms Outcomes: Biochemical Data, Nutrition Focused Physical Findings, Weight, Meal Time Behavior    Discharge Planning:    Continue current diet, Continue Oral Nutrition Supplement     Gwenlyn Kawasaki, MS, RD, LD  Contact: [phone]

## 2022-08-19 NOTE — PROGRESS NOTES
Physical Therapy  Facility/Department: [address] [place] [address]  Rehabilitation Physical Therapy Treatment Note    NAME: Desire Alejo  : 1955 (69 y.o.)  MRN: [id number]  CODE STATUS: Full Code    Date of Service: 22       Restrictions:  Restrictions/Precautions: General Precautions; Fall Risk;Up as Tolerated  Position Activity Restriction  Spinal Precautions: No Bending; No Twisting; No Lifting  Spinal Precautions: TLSO when OOB, can jamila EOB; no lifting >10 lbs  Other position/activity restrictions: 3L O2, TLSO     SUBJECTIVE  Subjective  Subjective: Pt seated in recliner chair upon arrival and agreeable to PT/OT cotx session followed by PT session this morning  Pain: Pt reports pain in back and R groin, rated 6/10, this morning. RN administers pain medication at start of session            OBJECTIVE  Cognition  Overall Cognitive Status: Exceptions  Arousal/Alertness: Appropriate responses to stimuli  Following Commands: Follows all commands without difficulty; Follows multistep commands with repitition  Attention Span: Attends with cues to redirect  Memory: Appears intact  Safety Judgement: Decreased awareness of need for safety  Problem Solving: Assistance required to identify errors made;Assistance required to correct errors made;Assistance required to implement solutions;Assistance required to generate solutions  Insights: Decreased awareness of deficits  Initiation: Requires cues for some  Sequencing: Requires cues for some  Orientation  Overall Orientation Status: Within Normal Limits  Orientation Level: Oriented X4    Functional Mobility  Sit to Supine  Assistance Level: Minimal assistance  Skilled Clinical Factors: with HOB near flat, use of rail, and min cues for log roll technique  Transfers  Surface: Wheelchair;From chair with arms  Additional Factors: Verbal cues; Hand placement cues; Increased time to complete  Device: Walker (RW)  Sit to Stand  Assistance Level: Minimal assistance; Requires x 2 assistance  Skilled Clinical Factors: PT/OT cotx session: grossly min A x 2 from recliner chair and w/c up to RW, with increased time and cues for hand placement. PT session: mod A from w/c up to RW with increased time and cues for hand placement. Increased time and encouragement required for all transfers d/t pain and fatigue. Stand to Sit  Assistance Level: Minimal assistance; Requires x 2 assistance  Skilled Clinical Factors: min A x 2 during cotx, min-mod A x 1 during PT session; decreased eccentric control and cues for hand placement  Stand Pivot  Assistance Level: Minimal assistance; Requires x 2 assistance  Skilled Clinical Factors: PT/OT cotx session: stand pivot recliner>w/c with min A x 2 using RW; cues for upright positioning and assist to maneuver RW. PT session: stand pivot w/c>bed with min-mod A using RW with assist to maneuver and stabilize RW throughout      Environmental Mobility  Ambulation (PT/OT cotx session)  Surface: Level surface  Device: Rolling walker  Distance: 20ft + 25ft  Activity: Within Unit  Additional Factors: Verbal cues; Hand placement cues; Increased time to complete  Assistance Level: Minimal assistance; Requires x 2 assistance (plus close w/c follow for safety)  Gait Deviations: Slow mike;Decreased step length bilateral;Decreased weight shift bilateral;Decreased heel strike right;Decreased heel strike left;Narrow base of support; Unsteady gait (kyphotic posture, cues for forward gaze)  Skilled Clinical Factors: distances limited by pain and fatigue. O2 sats 86-88% on 3L after each bout, recovering to 90% after ~1 min seated rest and PLB.  Consistent encouragement to sustain ambulation and increase distance             PT Exercises  Exercise Treatment (PT session):   Seated BLE exercises: ankle pumps x 20, LAQ x 15, seated marching x 15, glute sets x 15 (O2 sats >90% throughout; pt requires rest break after each exercise d/t deconditioning and weakness)      ASSESSMENT/PROGRESS TOWARDS GOALS  Vital Signs  Heart Rate: 88  Heart Rate Source: Monitor  BP: 131/83  BP Location: Right upper arm  MAP (Calculated): 99  SpO2: 98 %  O2 Device: Nasal cannula (3L)    Assessment  Assessment: Pt initially seen for PT/OT cotx session this morning d/t medical complexity, high pain levels, deconditioning, and in order to increase safety and facilitate progression of functional mobility. Focus of cotx session on ambulation and increasing activity tolerance. Pt able to complete functional transfers with grossly min A x 2 and increased time and ambulate up to 25ft with min A x 2 using RW (plus close w/c follow). Pt does fatigue quickly with standing and ambulation d/t deconditioning and pain; requires encouragement at time to sustain participation and progress distance. Pt then seen for PT session with focus on transfers and LE strengthening. Pt continues to require multiple therapeutic rest breaks throughout session. Pt remains overall limited by deconditioning, pain, and generalized weakness. Will continue to progress functional mobility as appropriate. Activity Tolerance: Patient limited by pain; Patient limited by fatigue;Patient limited by endurance; Patient tolerated treatment well  Discharge Recommendations: 24 hour supervision or assist;Home with Home health PT  PT Equipment Recommendations  Other: TBD pending progress- pt has 4WW and w/c at home    Goals  Patient Goals   Patient goals : \"to be able to walk straight\"  Short Term Goals  Time Frame for Short term goals: 5 days- 8/20/22  Short term goal 1: Pt will complete bed mobility with SBA, with min cues for log roll technique  Short term goal 2: Pt will complete functional transfers with CGA consistently, using LRAD  Short term goal 3: Pt will ambulate 25ft with min A using RW  Long Term Goals  Time Frame for Long term goals : 10 days- 8/26/22  Long term goal 1: Pt will complete bed mobility with mod I without cues for log roll technique  Long term goal 2: Pt will complete functional transfers with mod I using LRAD  Long term goal 3: Pt will ambulate 50ft with supervision using LRAD  Long term goal 4: Pt will complete car transfer with supervision using LRAD  Long term goal 5: Pt will verbalize 3/3 spinal precautions without cues and adhere to precautions with mobility without cues    PLAN OF CARE/SAFETY  Plan  Plan: 5-7 times per week  Plan weeks: 10 days  Current Treatment Recommendations: Strengthening;Balance training;Functional mobility training;Transfer training; Endurance training; Wheelchair mobility training;Gait training;Stair training;Equipment evaluation, education, & procurement; Neuromuscular re-education;Home exercise program;Safety education & training;Patient/Caregiver education & training  Safety Devices  Type of Devices: All fall risk precautions in place; Bed alarm in place;Call light within reach; Patient at risk for falls; Left in bed;Nurse notified    EDUCATION  Education  Education Given To: Patient  Education Provided: Role of Therapy;Cognition;Plan of Care;Family Education;Mobility Training;Equipment;Precautions;Transfer Training; Safety; Energy Conservation; Fall Prevention Strategies;DME/Home Modifications  Education Provided Comments: role of PT, PT POC, safety strategies, goals, back precautions, pain management, bed mobility, transfers, progression of activity, initiation of gait training  Education Method: Demonstration;Verbal  Barriers to Learning: None  Education Outcome: Verbalized understanding;Demonstrated understanding;Continued education needed        Therapy Time   Individual Concurrent Group Co-treatment   Time In 1000     0930   Time Out 1030     1000   Minutes 30     30     Timed Code Treatment Minutes: 1000 [employer], PT, DPT 08/19/22 at 10:28 AM

## 2022-08-19 NOTE — PROGRESS NOTES
of deficits  Initiation: Requires cues for some  Sequencing: Requires cues for some  Orientation  Overall Orientation Status: Within Normal Limits  Orientation Level: Oriented X4   Perception  Overall Perceptual Status: Impaired  Unilateral Attention: Appears intact  Initiation: Cues to initiate tasks  Motor Planning: Appears intact  Perseveration: Not present     ADL  Feeding  Assistance Level: Increased time to complete;Stand by assist;Set-up  Upper Extremity Dressing  Assistance Level: Increased time to complete;Minimal assistance;Verbal cues  Skilled Clinical Factors: Valeria to jamila/doff brace and manage O2/lines  Putting On/Taking Off Footwear  Assistance Level: Increased time to complete;Dependent          Functional Mobility  Device: Rolling walker  Activity: Transport items; Retrieve items  Assistance Level: Minimal assistance; Requires x 2 assistance  Skilled Clinical Factors: Valeria x2 sit <> stand and x20 ft to x25 ft with RW and WC follow; limited by weakness/pain and anxiety needing encouragement to remain standing  Transfers  Surface: To bed; To chair with arms; Bedside commode;From chair with arms; Wheelchair  Additional Factors: Set-up; Verbal cues; Hand placement cues; Increased time to complete; With handrails  Device: Walker  Sit to Stand  Assistance Level: Moderate assistance;Minimal assistance; Requires x 2 assistance  Skilled Clinical Factors: Valeria x2 to modA x1 with mod cues for hand placement and mechanics  Stand to Sit  Assistance Level: Moderate assistance;Minimal assistance; Requires x 2 assistance  Skilled Clinical Factors: Valeria x2 to modA x1 with mod cues for hand placement and mechanics  Bed To/From Chair  Technique: Stand step  Assistance Level: Moderate assistance; Requires x 2 assistance;Minimal assistance  Skilled Clinical Factors: Valeria x2 to modA x1 with mod cues for hand placement and mechanics  Stand Pivot  Assistance Level: Minimal assistance; Moderate assistance; Requires x 2 assistance  Skilled Clinical Factors: Valeria x2 to modA x1 with mod cues for hand placement and mechanics         Assessment  Assessment  Assessment: Patient continues to be limited by pain and anxiety needing mod cues for encoruagement to stand and hand placement/mechanics during sit <> stand transfers but able to tolerate 20 ft and 25 ft of mobility with encouragement and standing rest breaks. Session was limited by pain and patient needing medication. Cont OT POC. Second session: Pt in bed, on side, RN nurse present. Pt reports \"I cant do it I have the dry heaves. \" With education/encouragement pt minimally agreeable to getting to bathroom to practice on/off BSC and grooming standing vs sitting sinkside. [address] [facility] sit <> stand from EOB, Valeria for RW and line management to WC. Valeria sit <> stand from [facility], modA stand step to [facility] over toilet with RW support. Lidocaine patch placed on R groin for pain management. Sat on toilet x10 minutes for attempt to have BM, unsuccessful. Pt able to complete pericare seated with setup/SPV, maxA however sit <> stand from [facility] due to BLE pain and weakness. Stand step to R side with RW Valeria. Sat sinkside x20 minutes to wash face/hands, brush hair and teeth. Rest breaks PRN, checking O2 multiple times and >90% throughout. With encouragement, pt agreeable to sitting in recliner for BUE ex. 2x10 1-2# free weight depending on fatigue and exercise: chest press, circles forwards/backwards, internal/external rotation, wrist flexion/extension, supination/pronation, elbow flexion/extension. Pt then requesting to lay down. Sit <> stand modA, Valeria stand to EOB, SPV to supine. Pt was given hot pack for R groin pain. Continues to be very limited by pain, weakness, standing tolerance, and constant c/o nausea. Cont OT POC. Activity Tolerance: Patient limited by pain; Patient limited by fatigue;Patient limited by endurance  Discharge Recommendations: 24 hour supervision or assist;Home with assist PRN;Home with nursing aide;Home with Home health OT;S Level 3  OT Equipment Recommendations  Equipment Needed: Yes  Other: TBD  Safety Devices  Safety Devices in place: Yes  Type of devices: Left in bed;Call light within reach; Bed alarm in place; Patient at risk for falls; All fall risk precautions in place;Gait belt;Nurse notified    Patient Education  Education  Education Given To: Patient; Family  Education Provided: Role of Therapy; ADL Function;Cognition; Family Education;IADL Function;Plan of Care;Home Exercise Program;Mobility Training;Equipment;DME/Home Modifications;Transfer Training;Precautions; Safety; Energy Conservation; Fall Prevention Strategies  Education Method: Verbal;Demonstration; Teach Back  Barriers to Learning: Cognition  Education Outcome: Verbalized understanding;Demonstrated understanding;Continued education needed    Plan  Plan  Times per Week: 5 out of 7 days  Times per Day: Daily  Plan Weeks: 10 days  Current Treatment Recommendations: Strengthening;ROM;Balance training;Functional mobility training; Endurance training;Gait training;Neuromuscular re-education; Safety education & training;Pain management;Patient/Caregiver education & training;Equipment evaluation, education, & procurement; Modalities; Positioning;Self-Care / ADL; Home management training;Cognitive/Perceptual training;Coordination training    Goals  Patient Goals   Patient goals :  \"To go home and be out of pain\"  Short Term Goals  Time Frame for Short term goals: 5 days (8/21/22)  Short Term Goal 1: Pt will complete standing task x3 minutes SBA with LRAD  Short Term Goal 2: Pt will complete toileting/transfer CGA with LRAD and DME PRN  Short Term Goal 3: Pt will complete LE dressing Valeria using AE and LRAD PRN  Long Term Goals  Time Frame for Long term goals : 10 days (8/26/22)  Long Term Goal 1: Pt will complete functional mobility in room/bathroom Rachael with LRAD for ADL management  Long Term Goal 2: Pt will complete IADL task standing vs sitting Rachael with LRAD  Long Term Goal 3: Pt will complete UE/LE bathing/dressing including donning/doffing brace (patient vs family education teachback) setup vs Rachael sitting vs standing with AE vs LRAD  Long Term Goal 4: Pt will complete toileting/transfer with LRAD and DME PRN Rachael      Therapy Time   Individual Concurrent Group Co-treatment   Time In       0930   Time Out       1000   Minutes       30   Timed Code Treatment Minutes: 30 Minutes     Second Session Therapy Time:   Individual Concurrent Group Co-treatment   Time In 1230        Time Out 1330        Minutes 60          Timed Code Treatment Minutes:  60 Minutes    Total Treatment Minutes:  [address] [place] Arm, OTR/L

## 2022-08-19 NOTE — PROGRESS NOTES
Precautions: No Bending, No Twisting, No Lifting  Spinal Precautions: TLSO when OOB, can jamila EOB; no lifting >10 lbs  Other position/activity restrictions: 3L O2, TLSO  Objective     Sit to Stand: Minimal Assistance (up to RW with increased time)  Stand to sit: Minimal Assistance  Bed to Chair: Minimal assistance, Moderate assistance (using RW via stand pivot transfer)     OT  PT Equipment Recommendations  Other: TBD pending progress- pt has 4WW and w/c at home  Toilet - Technique: Stand step, To left, To right (grab bar vs RW)  Equipment Used: Standard toilet  Toilet Transfers Comments: assist with lifting and lowering, would benefit from [facility] over toilet with RW vs grab bar for support  Assessment        SLP                Body mass index is 25.3 kg/m². Assessment and Plan: Segundo Ortiz is a 69year old female with a past medical history significant for chronic respiratory failure on 3 L home O2, ILD, COPD, paroxysmal atrial fibrillation, DM2, HTN, HLD, and thyroid disease who presented to  on 8/11/22 for planned T11-L3 PDFF with L1 corpectomy for L1 burst fracture. She was admitted to [facility] on 8/16/22 due to functional deficits below her baseline. L1 Burst Fracture  - s/p T11-L3 PDFF with L1 corpectomy  - TLSO when OOB  - pain control  - incision care  - PT, OT     Chronic Respiratory Failure  - on 3 L home O2 at baseline  - wean oxygen for goal SpO2>88%  - humidify air     Hypokalemia  - increase replacement  - monitor intermittently      ILD/COPD  - pifenidone TID  - scheduled duo-nebs  - follows with [facility]     pAF  - not been on [facility]  - monitor     DM2  - on januvia and jardiance at home  - monitor glucose, consider SSI if patient noted to be hyperglycemic. Has not been so far  - patient to have her  bring in home meds     Hypothyroidism  - synthroid     GERD  - PPI     Anxiety/Depression  - zoloft, mirtazipine     HLD  - statin      Bowels: Schedule stool softener. Follow bowel movements. Enema or suppository if needed. Bladder: Check PVR x 3. [facility] if PVR > 200ml or if any volume is > 500 ml. Sleep: Trazodone provided prn. Follow up appointments: [facility], PCP, Jacquie Gomez MD 8/19/2022, 2:47 PM

## 2022-08-20 LAB
GLUCOSE BLD-MCNC: 101 MG/DL (ref 70–99)
GLUCOSE BLD-MCNC: 107 MG/DL (ref 70–99)
PERFORMED ON: ABNORMAL
PERFORMED ON: ABNORMAL

## 2022-08-20 PROCEDURE — 1280000000 HC REHAB R&B

## 2022-08-20 PROCEDURE — 6370000000 HC RX 637 (ALT 250 FOR IP): Performed by: STUDENT IN AN ORGANIZED HEALTH CARE EDUCATION/TRAINING PROGRAM

## 2022-08-20 PROCEDURE — 6360000002 HC RX W HCPCS: Performed by: STUDENT IN AN ORGANIZED HEALTH CARE EDUCATION/TRAINING PROGRAM

## 2022-08-20 PROCEDURE — 94761 N-INVAS EAR/PLS OXIMETRY MLT: CPT

## 2022-08-20 PROCEDURE — 97110 THERAPEUTIC EXERCISES: CPT

## 2022-08-20 PROCEDURE — 94640 AIRWAY INHALATION TREATMENT: CPT

## 2022-08-20 PROCEDURE — 92526 ORAL FUNCTION THERAPY: CPT

## 2022-08-20 PROCEDURE — 97530 THERAPEUTIC ACTIVITIES: CPT

## 2022-08-20 PROCEDURE — 2700000000 HC OXYGEN THERAPY PER DAY

## 2022-08-20 PROCEDURE — 97129 THER IVNTJ 1ST 15 MIN: CPT

## 2022-08-20 PROCEDURE — 97535 SELF CARE MNGMENT TRAINING: CPT

## 2022-08-20 RX ORDER — NICOTINE POLACRILEX 4 MG/1
20 GUM, CHEWING ORAL DAILY PRN
Status: DISCONTINUED | OUTPATIENT
Start: 2022-08-20 | End: 2022-09-02 | Stop reason: HOSPADM

## 2022-08-20 RX ADMIN — OXYCODONE AND ACETAMINOPHEN 2 TABLET: 5; 325 TABLET ORAL at 20:52

## 2022-08-20 RX ADMIN — CETIRIZINE HYDROCHLORIDE 10 MG: 10 TABLET, FILM COATED ORAL at 08:33

## 2022-08-20 RX ADMIN — LEVOTHYROXINE SODIUM 125 MCG: 0.12 TABLET ORAL at 05:53

## 2022-08-20 RX ADMIN — HEPARIN SODIUM 5000 UNITS: 5000 INJECTION INTRAVENOUS; SUBCUTANEOUS at 14:09

## 2022-08-20 RX ADMIN — SENNOSIDES 8.6 MG: 8.6 TABLET, COATED ORAL at 08:34

## 2022-08-20 RX ADMIN — IPRATROPIUM BROMIDE AND ALBUTEROL SULFATE 1 AMPULE: 2.5; .5 SOLUTION RESPIRATORY (INHALATION) at 07:22

## 2022-08-20 RX ADMIN — IPRATROPIUM BROMIDE AND ALBUTEROL SULFATE 1 AMPULE: 2.5; .5 SOLUTION RESPIRATORY (INHALATION) at 19:16

## 2022-08-20 RX ADMIN — METHOCARBAMOL TABLETS 750 MG: 750 TABLET, COATED ORAL at 08:33

## 2022-08-20 RX ADMIN — ATORVASTATIN CALCIUM 40 MG: 40 TABLET, FILM COATED ORAL at 08:34

## 2022-08-20 RX ADMIN — NADOLOL 20 MG: 20 TABLET ORAL at 08:33

## 2022-08-20 RX ADMIN — MIRTAZAPINE 30 MG: 15 TABLET, FILM COATED ORAL at 20:52

## 2022-08-20 RX ADMIN — HEPARIN SODIUM 5000 UNITS: 5000 INJECTION INTRAVENOUS; SUBCUTANEOUS at 20:53

## 2022-08-20 RX ADMIN — METHOCARBAMOL TABLETS 750 MG: 750 TABLET, COATED ORAL at 14:09

## 2022-08-20 RX ADMIN — OXYCODONE AND ACETAMINOPHEN 2 TABLET: 5; 325 TABLET ORAL at 05:52

## 2022-08-20 RX ADMIN — SERTRALINE HYDROCHLORIDE 200 MG: 50 TABLET ORAL at 08:33

## 2022-08-20 RX ADMIN — ONDANSETRON 4 MG: 4 TABLET, ORALLY DISINTEGRATING ORAL at 20:40

## 2022-08-20 RX ADMIN — HEPARIN SODIUM 5000 UNITS: 5000 INJECTION INTRAVENOUS; SUBCUTANEOUS at 05:53

## 2022-08-20 RX ADMIN — ONDANSETRON 4 MG: 4 TABLET, ORALLY DISINTEGRATING ORAL at 08:40

## 2022-08-20 RX ADMIN — METHOCARBAMOL TABLETS 750 MG: 750 TABLET, COATED ORAL at 20:52

## 2022-08-20 RX ADMIN — PANTOPRAZOLE SODIUM 40 MG: 40 TABLET, DELAYED RELEASE ORAL at 05:53

## 2022-08-20 RX ADMIN — POTASSIUM CHLORIDE 20 MEQ: 1500 TABLET, EXTENDED RELEASE ORAL at 08:34

## 2022-08-20 RX ADMIN — NICOTINE POLACRILEX 20 MG: 4 GUM, CHEWING ORAL at 17:41

## 2022-08-20 ASSESSMENT — PAIN DESCRIPTION - ORIENTATION
ORIENTATION: LOWER
ORIENTATION: LOWER

## 2022-08-20 ASSESSMENT — PAIN SCALES - GENERAL
PAINLEVEL_OUTOF10: 5
PAINLEVEL_OUTOF10: 7
PAINLEVEL_OUTOF10: 7
PAINLEVEL_OUTOF10: 8

## 2022-08-20 ASSESSMENT — PAIN DESCRIPTION - LOCATION
LOCATION: BACK

## 2022-08-20 ASSESSMENT — PAIN DESCRIPTION - DESCRIPTORS
DESCRIPTORS: ACHING;THROBBING
DESCRIPTORS: ACHING;DISCOMFORT
DESCRIPTORS: ACHING;THROBBING

## 2022-08-20 ASSESSMENT — PAIN DESCRIPTION - ONSET: ONSET: ON-GOING

## 2022-08-20 ASSESSMENT — PAIN DESCRIPTION - FREQUENCY: FREQUENCY: INTERMITTENT

## 2022-08-20 ASSESSMENT — PAIN DESCRIPTION - PAIN TYPE: TYPE: SURGICAL PAIN

## 2022-08-20 NOTE — PROGRESS NOTES
Occupational Therapy  Facility/Department: [facility]  Rehabilitation Occupational Therapy Daily Treatment Note    Date: 22  Patient Name: Narciso Parry       Room: 5776/6696-75  MRN: [id number]  Account: [de-identified]   : 1955  (73 y.o.) Gender: female        Diagnosis: Pt underwent T11-L3 PDFF with L1 corpectomy due to L1 burst fracture. Past Medical History:  has a past medical history of A-fib ([facility] [address] Anxiety, Cryptogenic organizing pneumonia ([facility] [address] Depression, GERD (gastroesophageal reflux disease), Hyperlipidemia, On home oxygen therapy, Rash, and Thyroid disease. Past Surgical History:   has a past surgical history that includes Cholecystectomy; bronchoscopy (N/A, 2019); bronchoscopy (2019); bronchoscopy (2019); bronchoscopy (2019); bronchoscopy (07/10/2019); bronchoscopy (N/A, 7/10/2019); Hysterectomy, total abdominal; bronchoscopy (Bilateral, 2019); bronchoscopy (N/A, 2019); bronchoscopy (N/A, 2019); and Arm Surgery (Left, 3/2/2020). Restrictions  Restrictions/Precautions: General Precautions; Fall Risk;Up as Tolerated  Spinal Precautions: TLSO when OOB, can jamila EOB; no lifting >10 lbs  Other position/activity restrictions: 3L O2, TLSO, spinal precautions, ok to shower. Subjective  Subjective: Pt supine in bed upon OT arrival. Pt unable to recall spinal precautions upon request. Pt reports feeling tired but agreeable to OT session. Restrictions/Precautions: General Precautions; Fall Risk;Up as Tolerated             Objective     Cognition  Overall Cognitive Status: Exceptions  Arousal/Alertness: Appropriate responses to stimuli  Following Commands: Follows all commands without difficulty; Follows multistep commands with repitition  Attention Span: Attends with cues to redirect  Memory: Appears intact  Safety Judgement: Decreased awareness of need for safety  Problem Solving: Assistance required to identify errors made;Assistance required to correct errors made;Assistance required to implement solutions;Assistance required to generate solutions  Insights: Decreased awareness of deficits  Initiation: Requires cues for some  Sequencing: Requires cues for some  Orientation  Overall Orientation Status: Within Normal Limits  Orientation Level: Oriented X4         ADL  Grooming/Oral Hygiene  Assistance Level: Minimal assistance;Verbal cues; Increased time to complete;Set-up  Skilled Clinical Factors: Min A seated in w/c at sink. Pt brushes teeth with set up assist. Pt mejia hair with partial assistance due to fatigue. Upper Extremity Bathing  Assistance Level: Stand by assist  Skilled Clinical Factors: seated on shower bench, pt washes/dries 4/4 UB parts using [address] and grab bar as needed. Pt requiring extra time. Lower Extremity Bathing  Equipment Provided: Long-handled sponge  Assistance Level: Moderate assistance  Skilled Clinical Factors: Pt requiring partial assistance to dry lower legs and full assistance to wash/dry buttocks. Pt requiring extra time. Pt completes bathing seated on bench with [address], grab bar and LH sponge. Upper Extremity Dressing  Assistance Level: Increased time to complete;Verbal cues; Moderate assistance  Skilled Clinical Factors: modA to jamila/doff brace, T shirt and manage O2/lines. Pt completes while seated with max cues for TLSO management. Pt requiring assistance to don TLSO straps. Lower Extremity Dressing  Assistance Level: Moderate assistance  Skilled Clinical Factors: Pt able to thread BLE into pants and underwear. Pt requiring full assistance to don/doff clothing over hips while in stance with RW and steadying assistance. Putting On/Taking Off Footwear  Assistance Level: Moderate assistance  Skilled Clinical Factors: OT provides training on sockaid. Pt able to don 1/2 socks using sockaid, but requiring assistance with 2nd sock due to fatigue. Pt using reacher to doff socks with moderate cues.   Tub/Shower Transfers  Type: Shower  Transfer From: Wheelchair  Transfer To: Tub transfer bench  Additional Factors: Cues for hand placement;Verbal cues; Set-up; Increased time to complete; With handrails  Assistance Level: Moderate assistance;Maximum assistance  Skilled Clinical Factors: Mod A stand pivot w/c to shower bench using RW. Pt fatigued following bathing. Pt transfers from shower bench to w/c Max A stand pivot with grab bar          Sit to Supine  Assistance Level: Minimal assistance  Supine to Sit  Assistance Level: Minimal assistance  Transfers  Surface: To bed; Wheelchair  Additional Factors: Set-up; Verbal cues; Hand placement cues; Increased time to complete; With handrails  Device: Walker  Sit to Stand  Assistance Level: Moderate assistance;Minimal assistance;Maximum assistance  Skilled Clinical Factors: Pt requiring increased assistance with transfers as pt fatigues. Pt transfers from EOB Min A using RW. Pt transfers from w/c Mod A using RW. Pt transfers from shower bench Max A using grab bar. Stand to Sit  Assistance Level: Moderate assistance;Minimal assistance  Skilled Clinical Factors: Pt requiring Min-Mod A for stand to sit transfers. Bed To/From Chair  Technique: Stand pivot  Assistance Level: Minimal assistance  Skilled Clinical Factors: Min A EOB stand pivot to w/c. Stand Pivot  Assistance Level: Minimal assistance; Moderate assistance;Maximum assistance  Skilled Clinical Factors: Pt requiring increased assistance as pt fatigue during session. Min  stand pivot EOB to w/c using RW. Mod A w/c to shower bench using RW. Max A shower bench to w/c usin ggrab bar stand pivot. Max A w/c to EOB stand pivot. Assessment  Assessment  Assessment: Pt tolerated OT session well despite 6/10 right kami pain and fatigue. Pt fatigues quickly with activity requiring increased assistance as session progresses.  Pt requiring Min A with stand pivot transfers at start of session using walker and by the end of session pt requiring Max A stand pivot transfers. OT provided education on energy conservation, compensatory strategies and AE using during bathing, dressing, and shower transfers. Pt able to demonstrate good understanding of AE use during LB dressing using reacher and sockaid. Pt able to complete total body bathing with Mod A, LB dressing Mod A, and UB dressing Mod A. Pt continues to demonstrate performance component deficits in balance, activity tolerance, strength, safety. Recommend continued OT service to increase safety and independence with ADLs and functional transfers. Continued OT POC. Activity Tolerance: Patient limited by pain; Patient limited by fatigue;Patient limited by endurance; Patient tolerated treatment well  Discharge Recommendations: 24 hour supervision or assist;Home with assist PRN;Home with nursing aide;Home with Home health OT;S Level 3  Safety Devices  Safety Devices in place: Yes  Type of devices: Left in bed;Call light within reach; Bed alarm in place; Patient at risk for falls; All fall risk precautions in place;Gait belt;Nurse notified    Patient Education  Education  Education Given To: Patient  Education Provided: Role of Therapy; ADL Function;Plan of Care;Equipment;DME/Home Modifications;Transfer Training;Precautions; Safety; Energy Conservation; Fall Prevention Strategies  Education Provided Comments: AE use for LB dressing using sockaid and reacher, compensatory UB/LB dressing with spinal precautions, WIS transfers, safety with bathing, TLSO management, log roll, spinal precautions, standing balance. Education Method: Verbal;Demonstration; Teach Back  Barriers to Learning: Cognition  Education Outcome: Verbalized understanding;Demonstrated understanding;Continued education needed    Plan  Plan  Times per Week: 5 out of 7 days  Times per Day: Daily  Plan Weeks: 10 days  Current Treatment Recommendations: Strengthening;ROM;Balance training;Functional mobility training; Endurance training;Gait training;Neuromuscular re-education; Safety education & training;Pain management;Patient/Caregiver education & training;Equipment evaluation, education, & procurement; Modalities; Positioning;Self-Care / ADL; Home management training;Cognitive/Perceptual training;Coordination training    Goals  Patient Goals   Patient goals :  \"To go home and be out of pain\"  Short Term Goals  Time Frame for Short term goals: 5 days (8/21/22)  Short Term Goal 1: Pt will complete standing task x3 minutes SBA with LRAD; ongoing  Short Term Goal 2: Pt will complete toileting/transfer CGA with LRAD and DME PRN; ongoing  Short Term Goal 3: Pt will complete LE dressing Valeria using AE and LRAD PRN; ongoing Mod A 8/20  Long Term Goals  Time Frame for Long term goals : 10 days (8/26/22)  Long Term Goal 1: Pt will complete functional mobility in room/bathroom Rachael with LRAD for ADL management  Long Term Goal 2: Pt will complete IADL task standing vs sitting Rachael with LRAD  Long Term Goal 3: Pt will complete UE/LE bathing/dressing including donning/doffing brace (patient vs family education teachback) setup vs Rachael sitting vs standing with AE vs LRAD; ongoing Mod A UB dressing, Mod A LB dressing 8/20  Long Term Goal 4: Pt will complete toileting/transfer with LRAD and DME PRN Rachael    AM-PAC Score               Therapy Time   Individual Concurrent Group Co-treatment   Time In 1030         Time Out 1200         Minutes 90         Timed Code Treatment Minutes: 80 Minutes       Jody Farrell OT

## 2022-08-20 NOTE — PROGRESS NOTES
Physical Therapy  Facility/Department: [facility]  Rehabilitation Physical Therapy Treatment Note    NAME: Piper Callaway  : 1955 (69 y.o.)  MRN: [id number]  CODE STATUS: Full Code    Date of Service: 22       Restrictions:  Restrictions/Precautions: General Precautions; Fall Risk;Up as Tolerated  Position Activity Restriction  Spinal Precautions: No Bending; No Twisting; No Lifting  Spinal Precautions: TLSO when OOB, can jamila EOB; no lifting >10 lbs  Other position/activity restrictions: 3L O2, TLSO, spinal precautions, ok to shower. SUBJECTIVE  Subjective  Subjective: Pt sidelying in bed on approach, agreeable to PT tx, reports feeling fatigued and having pain in R groin \"I'll try my best I'm just worn out\"  Pain: Pt reports abdominal pain/acid refulx but doesn't provide a pain score and reports 6/10 pain in R groin that she reports has been improved with pain patch. OBJECTIVE  Cognition  Overall Cognitive Status: Exceptions  Arousal/Alertness: Appropriate responses to stimuli  Following Commands: Follows all commands without difficulty; Follows multistep commands with repitition  Attention Span: Attends with cues to redirect  Memory: Appears intact  Safety Judgement: Decreased awareness of need for safety  Problem Solving: Assistance required to identify errors made;Assistance required to correct errors made;Assistance required to implement solutions;Assistance required to generate solutions  Insights: Decreased awareness of deficits  Initiation: Requires cues for some  Sequencing: Requires cues for some  Orientation  Overall Orientation Status: Within Normal Limits  Orientation Level: Oriented X4    Functional Mobility  Sit to Supine  Assistance Level: Stand by assist  Skilled Clinical Factors: HOB elevated, use of BR, increased time to complete with log roll technique, completed x 2 reps  Supine to Sit  Assistance Level: Stand by assist  Skilled Clinical Factors: HOB elevated, use of BR, increased time to complete with log roll technique, completed x 2 reps  Balance  Sitting Balance: Stand by assistance  Pt completes x 3 bouts scooting laterally on EOB with grossly SBA x ~3' to R + 3' to L on each bout. Pt able to complete partial hip clearance from surface with UE use and anterior WS. Completed as pre t/f training to improve strength and initiation of t/f as pt declines participation in t/f and OOB mobility this date d/t pain and fatigue. Pt sitting EOB x 5 minutes while completing dynamic reaching with UUE support on bed with grossly SBA to grab cones. Completed with pt reaching within and slightly outside BRY, cues provided to maintain spinal px and twisting by reaching to IL side during task. Completed with cues to improve upright posture, completed to improve balance and strength. PT Exercises  Exercise Treatment: S  eated BLE exercises:   ankle pumps x 20,   LAQ x 15,   seated marching x 15,   glute sets x 15,   hip abduction x 15,   sidelying QS x 15 with manual resistance (pt reports too uncomfortable to lie in supine)  Sidelying clamshells x 15 (pt declines to lie on R side, L side completed against gravity, R side completed with isometric pt pushing into bed. Cues for sequence/technique, intermittent rest breaks d/t fatigue. ASSESSMENT/PROGRESS TOWARDS GOALS  Vital Signs  Heart Rate: 74  Heart Rate Source: Monitor  BP: 118/69  BP Location: Right upper arm  MAP (Calculated): 85.33  SpO2: 97 %  O2 Device: Nasal cannula  Comment: 3L O2 NC    Assessment  Assessment: Pt seen in pm for PT tx, pt is agreeable with encouragement. Pt declines OOB mobility this date d/t pain despite maximal encouragement. Pt does verbalize need and benefit of mobility and is receptive to education however pt getting tearful with PT attempts to initiate t/f training and OOB mobility. Pt states she is fatigued from shower and having too much pain in groin.  Pt educated regarding benefit of gait and mobility for back pain. Pt is agreeable to work on seated balance and strengthening while seated and scooting at EOB. Pt requires SBA for bed mobility and SBA for scooting at EOB. Pt will benefit from continued skilled PT in ARU to address above deficits, will continue to progress mobility as tolerated. Activity Tolerance: Patient limited by pain; Patient limited by fatigue;Patient limited by endurance; Patient tolerated treatment well  Discharge Recommendations: 24 hour supervision or assist;Home with Home health PT  PT Equipment Recommendations  Other: TBD pending progress- pt has 4WW and w/c at home    Goals  Patient Goals   Patient goals : \"to be able to walk straight\"  Short Term Goals  Time Frame for Short term goals: 5 days- 8/20/22  Short term goal 1: Pt will complete bed mobility with SBA, with min cues for log roll technique -- 8/20: GOAL PARTIALLY MET SBA with bed rails and cues for log roll  Short term goal 2: Pt will complete functional transfers with CGA consistently, using LRAD -- 8/20: GOAL NOT MET Valeria x 2  Short term goal 3: Pt will ambulate 25ft with min A using RW -- 8/20: GOAL NOT MET Valeria x 2 25' with RW  Long Term Goals  Time Frame for Long term goals : 10 days- 8/26/22  Long term goal 1: Pt will complete bed mobility with mod I without cues for log roll technique  Long term goal 2: Pt will complete functional transfers with mod I using LRAD  Long term goal 3: Pt will ambulate 50ft with supervision using LRAD  Long term goal 4: Pt will complete car transfer with supervision using LRAD  Long term goal 5: Pt will verbalize 3/3 spinal precautions without cues and adhere to precautions with mobility without cues    PLAN OF CARE/SAFETY  Plan  Plan: 5-7 times per week  Plan weeks: 10 days  Specific Instructions for Next Treatment: Progress mobility as tolerated  Current Treatment Recommendations: Strengthening;Balance training;Functional mobility training;Transfer training; Endurance training; Wheelchair mobility training;Gait training;Stair training;Equipment evaluation, education, & procurement; Neuromuscular re-education;Home exercise program;Safety education & training;Patient/Caregiver education & training;Positioning  Safety Devices  Type of Devices: All fall risk precautions in place; Bed alarm in place;Call light within reach; Patient at risk for falls; Left in bed;Nurse notified    EDUCATION  Education  Education Given To: Patient  Education Provided: Role of Therapy;Cognition;Plan of Care;Family Education;Mobility Training;Equipment;Precautions;Transfer Training; Safety; Energy Conservation; Fall Prevention Strategies;DME/Home Modifications  Education Provided Comments: Educated on importance of OOB mobility, spinal px and use of TLSO, educated in positioning, functional mobility and positional changes with back pain.   Education Method: Demonstration;Verbal  Barriers to Learning: None  Education Outcome: Verbalized understanding;Demonstrated understanding;Continued education needed        Therapy Time   Individual Concurrent Group Co-treatment   Time In 1230         Time Out 1330         Minutes 60           Timed Code Treatment Minutes: [address] [facility] PT, DPT 08/20/22 at 4:00 PM

## 2022-08-20 NOTE — PLAN OF CARE
Problem: Pain  Goal: Verbalizes/displays adequate comfort level or baseline comfort level  8/20/2022 0853 by Evan Mercado, RN  Outcome: Progressing  8/19/2022 2337 by Collins Vizcaino, RN  Outcome: Progressing

## 2022-08-20 NOTE — PROGRESS NOTES
Speech Language Pathology  [facility]: ACUTE REHAB UNIT  SPEECH-LANGUAGE PATHOLOGY      [x] Daily  [] Weekly Care Conference Note  [] Discharge    Brandy Witt      :1955  PKL:[id number]  Rehab Dx/Hx: H/O Spinal surgery [Z98.890]    Precautions: falls  Home situation: lives with , dtr manages meds, shares finances with ,  does the cooking, cleaning, laundry, yardwork, not an active    ST Dx: [] Aphasia  [] Dysarthria  [] Apraxia   [x] Oropharyngeal dysphagia [x] Cognitive Impairment  [] Other:   Date of Admit: 2022  Room #: 0162/0162-01    Current functional status (updated daily):         Pt being seen for : [] Speech/Language Treatment  [x] Dysphagia Treatment [x] Cognitive Treatment  [] Other:  Communication: [x]WFL  [] Aphasia  [] Dysarthria  [] Apraxia  [] Pragmatic Impairment [] Non-verbal  [] Hearing Loss  [] Other:   Cognition: [] WFL  [x] Mild  [] Moderate  [] Severe [] Unable to Assess  [] Other:  Memory: [] WFL  [x] Mild  [] Moderate  [] Severe [] Unable to Assess  [] Other:  Behavior: [x] Alert  [x] Cooperative  [x]  Pleasant  [] Confused  [] Agitated  [] Uncooperative  [] Distractible [] Motivated  [] Self-Limiting [] Anxious  [] Other:  Endurance:  [x] Adequate for participation in SLP sessions  [] Reduced overall  [] Lethargic  [] Other:  Safety: [x] No concerns at this time  [] Reduced insight into deficits  []  Reduced safety awareness [] Not following call light procedures  [] Unable to Assess  [] Other:    Current Diet Order:ADULT DIET;  Regular  ADULT ORAL NUTRITION SUPPLEMENT; Breakfast, Lunch, Dinner; Standard High Calorie/High Protein Oral Supplement  Swallowing Precautions: upright for all intake, stay upright for at least 30 mins after intake, small bites/sips, alternate bites/sips, slow rate of intake, general GERD precautions, and general aspiration precautions        Date: 2022      Tx session 1  1430 - 1500 Tx session 2  All tx needs met in session 1    Total Timed Code Min 30    Total Treatment Minutes 30    Individual Treatment Minutes 30    Group Treatment Minutes 0    Co-Treat Minutes 0    Variance/Reason:  0    Pain Pt denies pain this date    Pain Intervention [] RN notified  [] Repositioned  [] Intervention offered and patient declined  [] N/A  [] Other: [] RN notified  [] Repositioned  [] Intervention offered and patient declined  [] N/A  [] Other:   Subjective     Pt alert and oriented, cooperative and agreeable to participate in therapy. Pt seen partially reclined in bed. Pt repositioned self during dysphagia tx. Objective:  Goals     Dysphagia Goals:  Short-term Goals  Timeframe for Short-term Goals: 5 days; 08/21/2022    Goal 1: The patient will tolerate recommended diet with no clinical s/s of aspiration 5/5        Pt able to tolerate limited morning meal w/out s/s of aspiration. Pt able to eat ~4 oz of pureed yogurt w/out complains or overt s/s of aspiration. Goal 2: The patient/caregiver will demonstrate understanding of compensatory swallow strategies, for improved swallow safety. Pt able to reposition self for improved swallow strategy positioning. Cog Goals:  Short-term Goals  Timeframe for Short-term Goals: 7 days (08/23/2022)    Goal 1: Pt will complete graded recall tasks using compensatory strategies with 90% acc given min cues    Word List: Thania Guan   -pt given 5 words; then asked a question about the order of the words  -pt completed task with 40% acc indep, pt did not improve with min cues      Goal 2: Pt will complete executive function tasks (e.g. money, time, etc) with 90% acc given min cues      Math Word Problems  -e.g. you buy a new bottle of ketchup every other month.  How many bottles of ketchup do you purchase each year?  -pt completed task with 90% acc indep, improved to 100% acc given min cues      Goal 3: Pt will complete higher level critical thinking tasks with 90% acc given min cues   Math Word Problems  -pt given word problem relating to everyday math questions  -e.g. If there are 65 seats on a bus and 20 people have already boarded, how many seats are left?  -pt completed task with 80% acc indep, 90% with min cues      Goal 4: Pt will complete respiratory retraining exercises to improve overall breath support with speech 10/10 given min cues     Did not target. Other areas targeted: N/A    Education:   SLP edu pt re: rationale of tx tasks, recall strategies, safe swallow strategies    Safety Devices: [x] Call light within reach  [] Chair alarm activated  [x] Bed alarm activated  [] Other: [] Call light within reach  [] Chair alarm activated  [] Bed alarm activated  [] Other:    Assessment: Pt alert and cooperative, agreeable to tx. Pt did not show overt s/s of aspiration with morning pureed meal, but limited PO trials d/t pt's \"tasking it easy\" d/t recent nausea. Pt able to complete math word problem task with 80% acc. Pt able to complete word retention task with 40% acc. Pt stated that cog tx can be difficult for her as it goes on, as the words \"swirl around in my head. \" Pt stated that her goal of the day was to \"get out of bed. \"      Continue goals above. Plan: Continue as per plan of care. Additional Information:     Barriers toward progress: Decreased endurance  Discharge recommendations:  [] Home independently  [] Home with assistance []  24 hour supervision  [] ECF [x] Other: See PT/OT notes   Continued Tx Upon Discharge: ? [] Yes [] No [x] TBD based on progress while on ARU [] Vital Stim indicated [] Other:   Estimated discharge date: 08/26/22    Interventions used this date:  [] Speech/Language Treatment  [] Instruction in HEP [] Group [x] Dysphagia Treatment [x] Cognitive Treatment   [] Other:       Total Time Breakdown / Charges    Time in Time out Total Time / units   Cognitive Tx 0745 0800 15 mins / 1 unit    Speech Tx -- -- --   Dysphagia Tx 0730 0745 15 mins / 1 unit Electronically Signed by     Arminda BREWER  [facility]  Speech Language Pathologist  [phone]

## 2022-08-20 NOTE — PROGRESS NOTES
Mich Hagen  8/20/2022  [id number]    Chief Complaint: H/O Spinal surgery    Subjective:   No acute events overnight. Today Pavel Bella reports feeling improved. She notes continued indigestion. She is on a PPI. XR abd yesterday negative for severe constipation or bowel obstruction. She denies other acute complaints at this time. ROS: denies f/c, n/v, cp, sob    Objective:  Patient Vitals for the past 24 hrs:   BP Temp Temp src Pulse Resp SpO2 Weight   08/20/22 0815 107/65 98 °F (36.7 °C) Oral 80 20 98 % --   08/20/22 0724 -- -- -- 75 20 96 % --   08/20/22 0622 -- -- -- -- 18 -- --   08/20/22 0615 -- -- -- -- -- -- 129 lb 8 oz (58.7 kg)   08/19/22 2050 115/71 97.3 °F (36.3 °C) Oral 80 18 91 % --   08/19/22 1515 125/72 97.8 °F (36.6 °C) Oral 72 15 99 % --   08/19/22 1400 128/66 -- -- 74 -- 98 % --   08/19/22 1349 -- -- -- -- -- 98 % --     Gen: No distress, pleasant. Older than stated age, seated on commode  HEENT: Normocephalic, atraumatic. CV: extremities well perfused  Resp: No respiratory distress. Abd: Soft, nontender   Ext: No edema. Neuro: Alert, oriented, appropriately interactive.    Psych: appropriate mood and affect    Wt Readings from Last 3 Encounters:   08/20/22 129 lb 8 oz (58.7 kg)   08/08/22 132 lb (59.9 kg)   08/06/22 132 lb (59.9 kg)       Laboratory data:   Lab Results   Component Value Date    WBC 8.0 08/19/2022    HGB 9.2 (L) 08/19/2022    HCT 28.0 (L) 08/19/2022    MCV 84.9 08/19/2022     08/19/2022       Lab Results   Component Value Date/Time     08/19/2022 06:46 AM    K 3.3 08/19/2022 06:46 AM     08/19/2022 06:46 AM    CO2 29 08/19/2022 06:46 AM    BUN 8 08/19/2022 06:46 AM    CREATININE <0.5 08/19/2022 06:46 AM    GLUCOSE 90 08/19/2022 06:46 AM    CALCIUM 7.6 08/19/2022 06:46 AM        Therapy progress:  PT  Position Activity Restriction  Spinal Precautions: No Bending, No Twisting, No Lifting  Spinal Precautions: TLSO when OOB, can jamila EOB; no lifting >10 lbs  Other position/activity restrictions: 3L O2, TLSO, spinal precautions, ok to shower. Objective     Sit to Stand: Minimal Assistance (up to RW with increased time)  Stand to sit: Minimal Assistance  Bed to Chair: Minimal assistance, Moderate assistance (using RW via stand pivot transfer)     OT  PT Equipment Recommendations  Other: TBD pending progress- pt has 4WW and w/c at home  Toilet - Technique: Stand step, To left, To right (grab bar vs RW)  Equipment Used: Standard toilet  Toilet Transfers Comments: assist with lifting and lowering, would benefit from [facility] over toilet with RW vs grab bar for support  Assessment        SLP                Body mass index is 23.69 kg/m². Assessment and Plan: Marcial Castro is a 69year old female with a past medical history significant for chronic respiratory failure on 3 L home O2, ILD, COPD, paroxysmal atrial fibrillation, DM2, HTN, HLD, and thyroid disease who presented to  on 8/11/22 for planned T11-L3 PDFF with L1 corpectomy for L1 burst fracture. She was admitted to [facility] on 8/16/22 due to functional deficits below her baseline. L1 Burst Fracture  - s/p T11-L3 PDFF with L1 corpectomy  - TLSO when OOB  - pain control  - incision care  - PT, OT     Chronic Respiratory Failure  - on 3 L home O2 at baseline  - wean oxygen for goal SpO2>88%  - humidify air     Hypokalemia  - increased replacement  - monitor intermittently      ILD/COPD  - pifenidone TID  - scheduled duo-nebs  - follows with [facility]     pAF  - not been on [facility]  - monitor     DM2  - on januvia and jardiance at home  - monitor glucose, consider SSI if patient noted to be hyperglycemic. Has not been so far  - patient to have her  bring in home meds     Hypothyroidism  - synthroid     GERD  - PPI     Anxiety/Depression  - zoloft, mirtazipine     HLD  - statin      Bowels: Schedule stool softener. Follow bowel movements. Enema or suppository if needed. Bladder: Check PVR x 3.   [facility] if PVR > 200ml or if any volume is > 500 ml. Sleep: Trazodone provided prn. Follow up appointments: Ortho Spine, PCP, Fermín Milton MD 8/20/2022, 1:01 PM

## 2022-08-21 LAB
GLUCOSE BLD-MCNC: 101 MG/DL (ref 70–99)
GLUCOSE BLD-MCNC: 121 MG/DL (ref 70–99)
GLUCOSE BLD-MCNC: 145 MG/DL (ref 70–99)
GLUCOSE BLD-MCNC: 95 MG/DL (ref 70–99)
PERFORMED ON: ABNORMAL
PERFORMED ON: NORMAL

## 2022-08-21 PROCEDURE — 6360000002 HC RX W HCPCS: Performed by: STUDENT IN AN ORGANIZED HEALTH CARE EDUCATION/TRAINING PROGRAM

## 2022-08-21 PROCEDURE — 6370000000 HC RX 637 (ALT 250 FOR IP): Performed by: STUDENT IN AN ORGANIZED HEALTH CARE EDUCATION/TRAINING PROGRAM

## 2022-08-21 PROCEDURE — 1280000000 HC REHAB R&B

## 2022-08-21 PROCEDURE — 94640 AIRWAY INHALATION TREATMENT: CPT

## 2022-08-21 PROCEDURE — 2700000000 HC OXYGEN THERAPY PER DAY

## 2022-08-21 PROCEDURE — 94761 N-INVAS EAR/PLS OXIMETRY MLT: CPT

## 2022-08-21 RX ORDER — LIDOCAINE 4 G/G
1 PATCH TOPICAL DAILY
Status: DISCONTINUED | OUTPATIENT
Start: 2022-08-21 | End: 2022-09-02 | Stop reason: HOSPADM

## 2022-08-21 RX ADMIN — MIRTAZAPINE 30 MG: 15 TABLET, FILM COATED ORAL at 22:03

## 2022-08-21 RX ADMIN — METHOCARBAMOL TABLETS 750 MG: 750 TABLET, COATED ORAL at 09:01

## 2022-08-21 RX ADMIN — IPRATROPIUM BROMIDE AND ALBUTEROL SULFATE 1 AMPULE: 2.5; .5 SOLUTION RESPIRATORY (INHALATION) at 12:28

## 2022-08-21 RX ADMIN — IPRATROPIUM BROMIDE AND ALBUTEROL SULFATE 1 AMPULE: 2.5; .5 SOLUTION RESPIRATORY (INHALATION) at 08:35

## 2022-08-21 RX ADMIN — HEPARIN SODIUM 5000 UNITS: 5000 INJECTION INTRAVENOUS; SUBCUTANEOUS at 13:26

## 2022-08-21 RX ADMIN — OXYCODONE AND ACETAMINOPHEN 2 TABLET: 5; 325 TABLET ORAL at 03:13

## 2022-08-21 RX ADMIN — HEPARIN SODIUM 5000 UNITS: 5000 INJECTION INTRAVENOUS; SUBCUTANEOUS at 05:30

## 2022-08-21 RX ADMIN — OXYCODONE AND ACETAMINOPHEN 2 TABLET: 5; 325 TABLET ORAL at 09:01

## 2022-08-21 RX ADMIN — LEVOTHYROXINE SODIUM 125 MCG: 0.12 TABLET ORAL at 05:30

## 2022-08-21 RX ADMIN — IPRATROPIUM BROMIDE AND ALBUTEROL SULFATE 1 AMPULE: 2.5; .5 SOLUTION RESPIRATORY (INHALATION) at 21:06

## 2022-08-21 RX ADMIN — OXYCODONE AND ACETAMINOPHEN 2 TABLET: 5; 325 TABLET ORAL at 17:56

## 2022-08-21 RX ADMIN — PANTOPRAZOLE SODIUM 40 MG: 40 TABLET, DELAYED RELEASE ORAL at 05:30

## 2022-08-21 RX ADMIN — METHOCARBAMOL TABLETS 750 MG: 750 TABLET, COATED ORAL at 22:03

## 2022-08-21 RX ADMIN — NICOTINE POLACRILEX 20 MG: 4 GUM, CHEWING ORAL at 09:01

## 2022-08-21 RX ADMIN — HEPARIN SODIUM 5000 UNITS: 5000 INJECTION INTRAVENOUS; SUBCUTANEOUS at 22:02

## 2022-08-21 RX ADMIN — METHOCARBAMOL TABLETS 750 MG: 750 TABLET, COATED ORAL at 13:26

## 2022-08-21 ASSESSMENT — PAIN DESCRIPTION - LOCATION
LOCATION: BACK
LOCATION: BACK;GROIN
LOCATION: BACK
LOCATION: BACK;GROIN

## 2022-08-21 ASSESSMENT — PAIN SCALES - GENERAL
PAINLEVEL_OUTOF10: 7
PAINLEVEL_OUTOF10: 0
PAINLEVEL_OUTOF10: 2

## 2022-08-21 ASSESSMENT — PAIN DESCRIPTION - ORIENTATION
ORIENTATION: LOWER
ORIENTATION: LOWER

## 2022-08-21 ASSESSMENT — PAIN DESCRIPTION - DESCRIPTORS
DESCRIPTORS: ACHING;DISCOMFORT

## 2022-08-21 ASSESSMENT — PAIN DESCRIPTION - PAIN TYPE: TYPE: SURGICAL PAIN

## 2022-08-21 NOTE — PROGRESS NOTES
Clem Grant  8/21/2022  [id number]    Chief Complaint: H/O Spinal surgery    Subjective:   No acute events overnight. Today Sara Rowland is seen in her room, resting in bed. She reports that her nausea is improved. She reports low back pain and would like to try a lidocaine patch. She notes continued right hip pain. She is trying to wean down on pain medication. ROS: denies f/c, n/v, cp, sob    Objective:  Patient Vitals for the past 24 hrs:   BP Temp Temp src Pulse Resp SpO2 Weight   08/21/22 0931 -- -- -- -- 20 -- --   08/21/22 0835 -- -- -- -- -- 99 % --   08/21/22 0800 123/72 97.6 °F (36.4 °C) Oral 74 20 99 % --   08/21/22 0500 -- -- -- -- -- -- 128 lb 6.4 oz (58.2 kg)   08/21/22 0343 -- -- -- -- 20 -- --   08/20/22 2050 117/64 97.4 °F (36.3 °C) Oral 91 20 94 % --   08/20/22 1917 -- -- -- -- -- 97 % --   08/20/22 1549 118/69 -- -- 74 -- 97 % --     Gen: No distress, pleasant. On side in bed  HEENT: Normocephalic, atraumatic. CV: extremities well perfused  Resp: No respiratory distress. On 3 L O2  Abd: Soft, nontender   Ext: No edema. Neuro: Alert, oriented, appropriately interactive.    Psych: appropriate mood and affect    Wt Readings from Last 3 Encounters:   08/21/22 128 lb 6.4 oz (58.2 kg)   08/08/22 132 lb (59.9 kg)   08/06/22 132 lb (59.9 kg)       Laboratory data:   Lab Results   Component Value Date    WBC 8.0 08/19/2022    HGB 9.2 (L) 08/19/2022    HCT 28.0 (L) 08/19/2022    MCV 84.9 08/19/2022     08/19/2022       Lab Results   Component Value Date/Time     08/19/2022 06:46 AM    K 3.3 08/19/2022 06:46 AM     08/19/2022 06:46 AM    CO2 29 08/19/2022 06:46 AM    BUN 8 08/19/2022 06:46 AM    CREATININE <0.5 08/19/2022 06:46 AM    GLUCOSE 90 08/19/2022 06:46 AM    CALCIUM 7.6 08/19/2022 06:46 AM        Therapy progress:  PT  Position Activity Restriction  Spinal Precautions: No Bending, No Twisting, No Lifting  Spinal Precautions: TLSO when OOB, can jamila EOB; no lifting >10 lbs  Other position/activity restrictions: 3L O2, TLSO, spinal precautions, ok to shower. Objective     Sit to Stand: Minimal Assistance (up to RW with increased time)  Stand to sit: Minimal Assistance  Bed to Chair: Minimal assistance, Moderate assistance (using RW via stand pivot transfer)     OT  PT Equipment Recommendations  Other: TBD pending progress- pt has 4WW and w/c at home  Toilet - Technique: Stand step, To left, To right (grab bar vs RW)  Equipment Used: Standard toilet  Toilet Transfers Comments: assist with lifting and lowering, would benefit from [facility] over toilet with RW vs grab bar for support  Assessment        SLP                Body mass index is 23.48 kg/m². Assessment and Plan: Drew Biggs is a 69year old female with a past medical history significant for chronic respiratory failure on 3 L home O2, ILD, COPD, paroxysmal atrial fibrillation, DM2, HTN, HLD, and thyroid disease who presented to  on 8/11/22 for planned T11-L3 PDFF with L1 corpectomy for L1 burst fracture. She was admitted to [facility] on 8/16/22 due to functional deficits below her baseline. L1 Burst Fracture  - s/p T11-L3 PDFF with L1 corpectomy  - TLSO when OOB  - pain control  - incision care  - PT, OT     Chronic Respiratory Failure  - on 3 L home O2 at baseline  - wean oxygen for goal SpO2>88%  - humidified air     Hypokalemia  - increased replacement  - monitor intermittently      ILD/COPD  - pifenidone TID  - scheduled duo-nebs  - follows with [facility]     pAF  - not been on [facility]  - monitor     DM2  - on januvia and jardiance at home, family brought in home meds  - glucose has been low due to poor PO intake, hold off on resuming home meds     Hypothyroidism  - synthroid     GERD  - PPI     Anxiety/Depression  - zoloft, mirtazipine     HLD  - statin      Bowels: Schedule stool softener. Follow bowel movements. Enema or suppository if needed. Bladder: Check PVR x 3. [address] if PVR > 200ml or if any volume is > 500 ml. Sleep: Trazodone provided prn. Follow up appointments: Ortho Spine, PCP, Angelia Moreira MD 8/21/2022, 12:26 PM

## 2022-08-21 NOTE — PLAN OF CARE
Problem: Nutrition Deficit:  Goal: Optimize nutritional status  8/21/2022 0835 by Evan Zapata RN  Outcome: Progressing  8/20/2022 2336 by Sultana Jerome RN  Outcome: Progressing

## 2022-08-22 LAB
ANION GAP SERPL CALCULATED.3IONS-SCNC: 9 MMOL/L (ref 3–16)
BASOPHILS ABSOLUTE: 0.1 K/UL (ref 0–0.2)
BASOPHILS RELATIVE PERCENT: 0.7 %
BUN BLDV-MCNC: 7 MG/DL (ref 7–20)
CALCIUM SERPL-MCNC: 7.7 MG/DL (ref 8.3–10.6)
CHLORIDE BLD-SCNC: 100 MMOL/L (ref 99–110)
CO2: 30 MMOL/L (ref 21–32)
CREAT SERPL-MCNC: <0.5 MG/DL (ref 0.6–1.2)
EOSINOPHILS ABSOLUTE: 0.5 K/UL (ref 0–0.6)
EOSINOPHILS RELATIVE PERCENT: 5.9 %
GFR AFRICAN AMERICAN: >60
GFR NON-AFRICAN AMERICAN: >60
GLUCOSE BLD-MCNC: 125 MG/DL (ref 70–99)
GLUCOSE BLD-MCNC: 132 MG/DL (ref 70–99)
GLUCOSE BLD-MCNC: 135 MG/DL (ref 70–99)
GLUCOSE BLD-MCNC: 139 MG/DL (ref 70–99)
HCT VFR BLD CALC: 27.8 % (ref 36–48)
HEMOGLOBIN: 8.8 G/DL (ref 12–16)
LYMPHOCYTES ABSOLUTE: 2.3 K/UL (ref 1–5.1)
LYMPHOCYTES RELATIVE PERCENT: 27.3 %
MAGNESIUM: 1.6 MG/DL (ref 1.8–2.4)
MCH RBC QN AUTO: 27.3 PG (ref 26–34)
MCHC RBC AUTO-ENTMCNC: 31.8 G/DL (ref 31–36)
MCV RBC AUTO: 85.9 FL (ref 80–100)
MONOCYTES ABSOLUTE: 0.6 K/UL (ref 0–1.3)
MONOCYTES RELATIVE PERCENT: 6.6 %
NEUTROPHILS ABSOLUTE: 5.1 K/UL (ref 1.7–7.7)
NEUTROPHILS RELATIVE PERCENT: 59.5 %
PDW BLD-RTO: 17.6 % (ref 12.4–15.4)
PERFORMED ON: ABNORMAL
PLATELET # BLD: 223 K/UL (ref 135–450)
PMV BLD AUTO: 7 FL (ref 5–10.5)
POTASSIUM REFLEX MAGNESIUM: 3.3 MMOL/L (ref 3.5–5.1)
RBC # BLD: 3.23 M/UL (ref 4–5.2)
SODIUM BLD-SCNC: 139 MMOL/L (ref 136–145)
WBC # BLD: 8.6 K/UL (ref 4–11)

## 2022-08-22 PROCEDURE — 94640 AIRWAY INHALATION TREATMENT: CPT

## 2022-08-22 PROCEDURE — 83735 ASSAY OF MAGNESIUM: CPT

## 2022-08-22 PROCEDURE — 97530 THERAPEUTIC ACTIVITIES: CPT

## 2022-08-22 PROCEDURE — 6360000002 HC RX W HCPCS: Performed by: STUDENT IN AN ORGANIZED HEALTH CARE EDUCATION/TRAINING PROGRAM

## 2022-08-22 PROCEDURE — 85025 COMPLETE CBC W/AUTO DIFF WBC: CPT

## 2022-08-22 PROCEDURE — 97116 GAIT TRAINING THERAPY: CPT

## 2022-08-22 PROCEDURE — 6370000000 HC RX 637 (ALT 250 FOR IP): Performed by: STUDENT IN AN ORGANIZED HEALTH CARE EDUCATION/TRAINING PROGRAM

## 2022-08-22 PROCEDURE — 97112 NEUROMUSCULAR REEDUCATION: CPT

## 2022-08-22 PROCEDURE — 92526 ORAL FUNCTION THERAPY: CPT

## 2022-08-22 PROCEDURE — 80048 BASIC METABOLIC PNL TOTAL CA: CPT

## 2022-08-22 PROCEDURE — 92507 TX SP LANG VOICE COMM INDIV: CPT

## 2022-08-22 PROCEDURE — 94761 N-INVAS EAR/PLS OXIMETRY MLT: CPT

## 2022-08-22 PROCEDURE — 97110 THERAPEUTIC EXERCISES: CPT

## 2022-08-22 PROCEDURE — 2700000000 HC OXYGEN THERAPY PER DAY

## 2022-08-22 PROCEDURE — 36415 COLL VENOUS BLD VENIPUNCTURE: CPT

## 2022-08-22 PROCEDURE — 1280000000 HC REHAB R&B

## 2022-08-22 PROCEDURE — 97535 SELF CARE MNGMENT TRAINING: CPT

## 2022-08-22 RX ORDER — POTASSIUM CHLORIDE 20 MEQ/1
40 TABLET, EXTENDED RELEASE ORAL 2 TIMES DAILY
Status: DISCONTINUED | OUTPATIENT
Start: 2022-08-22 | End: 2022-08-23

## 2022-08-22 RX ORDER — LANOLIN ALCOHOL/MO/W.PET/CERES
400 CREAM (GRAM) TOPICAL DAILY
Status: DISCONTINUED | OUTPATIENT
Start: 2022-08-22 | End: 2022-09-02 | Stop reason: HOSPADM

## 2022-08-22 RX ADMIN — METHOCARBAMOL TABLETS 750 MG: 750 TABLET, COATED ORAL at 20:45

## 2022-08-22 RX ADMIN — MIRTAZAPINE 30 MG: 15 TABLET, FILM COATED ORAL at 20:45

## 2022-08-22 RX ADMIN — POTASSIUM CHLORIDE 20 MEQ: 1500 TABLET, EXTENDED RELEASE ORAL at 09:00

## 2022-08-22 RX ADMIN — IPRATROPIUM BROMIDE AND ALBUTEROL SULFATE 1 AMPULE: 2.5; .5 SOLUTION RESPIRATORY (INHALATION) at 21:05

## 2022-08-22 RX ADMIN — OXYCODONE AND ACETAMINOPHEN 2 TABLET: 5; 325 TABLET ORAL at 20:45

## 2022-08-22 RX ADMIN — METHOCARBAMOL TABLETS 750 MG: 750 TABLET, COATED ORAL at 14:30

## 2022-08-22 RX ADMIN — NICOTINE POLACRILEX 20 MG: 4 GUM, CHEWING ORAL at 09:02

## 2022-08-22 RX ADMIN — OXYCODONE AND ACETAMINOPHEN 1 TABLET: 5; 325 TABLET ORAL at 12:51

## 2022-08-22 RX ADMIN — SENNOSIDES 8.6 MG: 8.6 TABLET, COATED ORAL at 08:59

## 2022-08-22 RX ADMIN — HEPARIN SODIUM 5000 UNITS: 5000 INJECTION INTRAVENOUS; SUBCUTANEOUS at 20:44

## 2022-08-22 RX ADMIN — SERTRALINE HYDROCHLORIDE 200 MG: 50 TABLET ORAL at 09:30

## 2022-08-22 RX ADMIN — CETIRIZINE HYDROCHLORIDE 10 MG: 10 TABLET, FILM COATED ORAL at 09:30

## 2022-08-22 RX ADMIN — HEPARIN SODIUM 5000 UNITS: 5000 INJECTION INTRAVENOUS; SUBCUTANEOUS at 14:30

## 2022-08-22 RX ADMIN — OXYCODONE AND ACETAMINOPHEN 2 TABLET: 5; 325 TABLET ORAL at 00:10

## 2022-08-22 RX ADMIN — Medication 2 PUFF: at 16:45

## 2022-08-22 RX ADMIN — NADOLOL 20 MG: 20 TABLET ORAL at 09:30

## 2022-08-22 RX ADMIN — ATORVASTATIN CALCIUM 40 MG: 40 TABLET, FILM COATED ORAL at 09:30

## 2022-08-22 RX ADMIN — PIRFENIDONE 801 MG: 801 TABLET, FILM COATED ORAL at 08:59

## 2022-08-22 RX ADMIN — Medication 400 MG: at 12:51

## 2022-08-22 RX ADMIN — METHOCARBAMOL TABLETS 750 MG: 750 TABLET, COATED ORAL at 09:00

## 2022-08-22 ASSESSMENT — PAIN DESCRIPTION - ORIENTATION
ORIENTATION: LOWER;MID
ORIENTATION: MID;LOWER

## 2022-08-22 ASSESSMENT — PAIN SCALES - GENERAL
PAINLEVEL_OUTOF10: 7
PAINLEVEL_OUTOF10: 0
PAINLEVEL_OUTOF10: 6
PAINLEVEL_OUTOF10: 2
PAINLEVEL_OUTOF10: 5
PAINLEVEL_OUTOF10: 7
PAINLEVEL_OUTOF10: 0
PAINLEVEL_OUTOF10: 6

## 2022-08-22 ASSESSMENT — PAIN - FUNCTIONAL ASSESSMENT
PAIN_FUNCTIONAL_ASSESSMENT: PREVENTS OR INTERFERES SOME ACTIVE ACTIVITIES AND ADLS
PAIN_FUNCTIONAL_ASSESSMENT: ACTIVITIES ARE NOT PREVENTED
PAIN_FUNCTIONAL_ASSESSMENT: ACTIVITIES ARE NOT PREVENTED

## 2022-08-22 ASSESSMENT — PAIN DESCRIPTION - FREQUENCY
FREQUENCY: CONTINUOUS
FREQUENCY: CONTINUOUS

## 2022-08-22 ASSESSMENT — PAIN DESCRIPTION - ONSET
ONSET: GRADUAL
ONSET: ON-GOING

## 2022-08-22 ASSESSMENT — PAIN DESCRIPTION - PAIN TYPE
TYPE: SURGICAL PAIN
TYPE: ACUTE PAIN;SURGICAL PAIN

## 2022-08-22 ASSESSMENT — PAIN DESCRIPTION - DESCRIPTORS
DESCRIPTORS: DISCOMFORT;ACHING
DESCRIPTORS: ACHING;DISCOMFORT
DESCRIPTORS: ACHING;SORE

## 2022-08-22 ASSESSMENT — PAIN DESCRIPTION - LOCATION
LOCATION: BACK
LOCATION: BACK
LOCATION: BACK;INCISION

## 2022-08-22 NOTE — PROGRESS NOTES
Letty Abraham  8/22/2022  [id number]    Chief Complaint: H/O Spinal surgery    Subjective:   No acute events overnight. Today Lissa Wall reports that she is feeling improved. She notes that she has not had her breathing treatments. Discussed with patient that this is likely because she was in therapy and not in her room at the time of the treatments. Discussed with nursing. She reports continued right hip pain. She denies other acute complaints at this time. ROS: denies f/c, n/v, cp, sob    Objective:  Patient Vitals for the past 24 hrs:   BP Temp Temp src Pulse Resp SpO2 Weight   08/22/22 1321 -- -- -- -- 16 -- --   08/22/22 0900 131/68 98 °F (36.7 °C) Oral 87 17 95 % --   08/22/22 0615 -- -- -- -- -- -- 129 lb 1.6 oz (58.6 kg)   08/21/22 2156 128/66 97.8 °F (36.6 °C) Oral 98 20 94 % --   08/21/22 2107 -- -- -- -- -- 98 % --   08/21/22 1826 -- -- -- -- 20 -- --     Gen: No distress, pleasant. On side in bed  HEENT: Normocephalic, atraumatic. CV: extremities well perfused  Resp: No respiratory distress. On 3 L O2  Abd: Soft, nontender   Ext: No edema. Neuro: Alert, oriented, appropriately interactive.  Speech fluent without aphasia  Psych: appropriate mood and affect    Wt Readings from Last 3 Encounters:   08/22/22 129 lb 1.6 oz (58.6 kg)   08/08/22 132 lb (59.9 kg)   08/06/22 132 lb (59.9 kg)       Laboratory data:   Lab Results   Component Value Date    WBC 8.6 08/22/2022    HGB 8.8 (L) 08/22/2022    HCT 27.8 (L) 08/22/2022    MCV 85.9 08/22/2022     08/22/2022       Lab Results   Component Value Date/Time     08/22/2022 06:38 AM    K 3.3 08/22/2022 06:38 AM     08/22/2022 06:38 AM    CO2 30 08/22/2022 06:38 AM    BUN 7 08/22/2022 06:38 AM    CREATININE <0.5 08/22/2022 06:38 AM    GLUCOSE 125 08/22/2022 06:38 AM    CALCIUM 7.7 08/22/2022 06:38 AM        Therapy progress:  PT  Position Activity Restriction  Spinal Precautions: No Bending, No Twisting, No Lifting  Spinal Precautions: TLSO when OOB, can jamila EOB; no lifting >10 lbs  Other position/activity restrictions: 3L O2, TLSO, spinal precautions, ok to shower. Objective     Sit to Stand: Minimal Assistance (up to RW with increased time)  Stand to sit: Minimal Assistance  Bed to Chair: Minimal assistance, Moderate assistance (using RW via stand pivot transfer)     OT  PT Equipment Recommendations  Other: TBD pending progress- pt has 4WW and w/c at home  Toilet - Technique: Stand step, To left, To right (grab bar vs RW)  Equipment Used: Standard toilet  Toilet Transfers Comments: assist with lifting and lowering, would benefit from [facility] over toilet with RW vs grab bar for support  Assessment        SLP                Body mass index is 23.61 kg/m². Assessment and Plan: Sandra Gu is a 69year old female with a past medical history significant for chronic respiratory failure on 3 L home O2, ILD, COPD, paroxysmal atrial fibrillation, DM2, HTN, HLD, and thyroid disease who presented to  on 8/11/22 for planned T11-L3 PDFF with L1 corpectomy for L1 burst fracture. She was admitted to [facility] on 8/16/22 due to functional deficits below her baseline.      L1 Burst Fracture  - s/p T11-L3 PDFF with L1 corpectomy  - TLSO when OOB  - pain control  - remove staples 8/25  - incision care  - PT, OT     Chronic Respiratory Failure  - on 3 L home O2 at baseline  - wean oxygen for goal SpO2>88%  - humidified air     Hypokalemia  - increased replacement, patient has only been taking intermittently  - monitor intermittently      ILD/COPD  - pifenidone TID  - scheduled duo-nebs  - follows with [facility]     pAF  - not been on [facility]  - monitor     DM2  - on januvia and jardiance at home, family brought in home meds  - glucose has been low due to poor PO intake, hold off on resuming home meds     Hypothyroidism  - synthroid     GERD  - PPI  - PRN omeprazole per home regimen     Anxiety/Depression  - zoloft, mirtazipine     HLD  - statin      Bowels: Schedule stool softener. Follow bowel movements. Enema or suppository if needed. Bladder: Check PVR x 3. [facility] if PVR > 200ml or if any volume is > 500 ml. Sleep: Trazodone provided prn. Follow up appointments: Ortho Spine, PCP, Jorge A Velasquez MD 8/22/2022, 4:32 PM

## 2022-08-22 NOTE — DISCHARGE INSTR - COC
Continuity of Care Form    Patient Name: Monique Noe   :  1955  MRN:  [id number]    Admit date:  2022  Discharge date:  2022    Code Status Order: Full Code   Advance Directives:     Admitting Physician:  Mati Rodriguez MD  PCP: Fortunato Napier MD    [facility]44/2903-19  Discharging Unit Phone Number: [phone]    Emergency Contact:   Extended Emergency Contact Information  Primary Emergency Contact: [facility]  Address: [address] [place] [address] Phone: [phone]  Mobile Phone: [phone]  Relation: Spouse  [facility] Phone: [phone]  Mobile Phone: [phone]  Relation: Child    Past Surgical History:  Past Surgical History:   Procedure Laterality Date    ARM SURGERY Left 3/2/2020    LEFT ULNAR NERVE DECOMPRESSION AT THE ELBOW performed by Mackenzie Spears MD at [facility] N/A 2019    EBUS WF W/ANES.  performed by Forest Medina MD at [facility]  2019    BRONCHOSCOPY/TRANSBRONCHIAL NEEDLE BIOPSY performed by Forest Medina MD at [facility]  2019    BRONCHOSCOPY/TRANSBRONCHIAL LUNG BIOPSY performed by Forest Medina MD at [facility]  2019    BRONCHOSCOPY ALVEOLAR LAVAGE performed by Forest Medina MD at [facility]  07/10/2019    BRONCHOSCOPY N/A 7/10/2019    BRONCHOSCOPY ALVEOLAR LAVAGE performed by Salome Shane MD at [facility] Bilateral 2019    BRONCHOSCOPY N/A 2019    BRONCHOSCOPY ALVEOLAR LAVAGE performed by Leanne Barrios MD at [facility] N/A 2019    BRONCHOSCOPY ALVEOLAR LAVAGE performed by Kyara He MD at [place] [address], TOTAL ABDOMINAL (CERVIX REMOVED)      partial (chronic obstructive pulmonary disease) ([facility]) J44.9    Type 2 diabetes mellitus without complication ([facility]) L20.2    Hyponatremia E87.1    Numbness and tingling in left hand R20.0, R20.2    Ulnar neuropathy at elbow of left upper extremity G56.22    Acute congestive heart failure ([facility]) I50.9    Left wrist pain M25.532    Left wrist tendinitis M77.8    GERD (gastroesophageal reflux disease) G18.4    Toxic metabolic encephalopathy M30.5    VIRGINIE (acute kidney injury) ([facility] N17.9    Lumbar radiculopathy M54.16    Acute on chronic respiratory failure ([facility]) J96.20    Rib pain on left side R07.81    Elevated brain natriuretic peptide (BNP) level R79.89    Fracture of multiple ribs of left side S22.42XA    Interstitial lung disease ([facility]) J84.9    Hypomagnesemia E83.42    Depression F32. A    Chronic diastolic congestive heart failure ([facility]) I50.32    Burst fracture of lumbar vertebra ([facility] S32.001A    H/O Spinal surgery Z98.890    Severe malnutrition ([facility] E43       Isolation/Infection:   Isolation            No Isolation          Patient Infection Status       Infection Onset Added Last Indicated Last Indicated By Review Planned Expiration Resolved Resolved By    None active    Resolved    COVID-19 (Rule Out) 07/14/22 07/14/22 07/14/22 COVID-19, Rapid (Ordered)   07/14/22 Rule-Out Test Resulted    COVID-19 (Rule Out) 07/04/22 07/04/22 07/04/22 COVID-19, Rapid (Ordered)   07/04/22 Rule-Out Test Resulted    COVID-19 (Rule Out) 05/06/22 05/06/22 05/06/22 COVID-19, Rapid (Ordered)   05/06/22 Rule-Out Test Resulted    COVID-19 (Rule Out) 04/18/22 04/18/22 04/18/22 COVID-19 & Influenza Combo (Ordered)   04/18/22 Rule-Out Test Resulted    COVID-19 (Rule Out) 01/25/22 01/25/22 01/25/22 COVID-19 & Influenza Combo (Ordered)   01/25/22 Rule-Out Test Resulted    COVID-19 (Rule Out) 01/24/22 01/24/22 01/24/22 COVID-19, Rapid (Ordered)   01/24/22 Rule-Out Test Resulted    COVID-19 (Rule Out) 06/18/21 06/18/21 06/18/21 COVID-19 & Influenza Combo (Ordered)   06/18/21 Rule-Out Test Resulted            Nurse Assessment:  Last Vital Signs: /66   Pulse 98   Temp 97.8 °F (36.6 °C) (Oral)   Resp 20   Ht 5' 2\" (1.575 m)   Wt 129 lb 1.6 oz (58.6 kg)   SpO2 94%   BMI 23.61 kg/m²     Last documented pain score (0-10 scale): Pain Level: 0  Last Weight:   Wt Readings from Last 1 Encounters:   08/22/22 129 lb 1.6 oz (58.6 kg)     Mental Status:  oriented and alert    IV Access:  - None    Nursing Mobility/ADLs:  Walking   Assisted  Transfer  Assisted  Bathing  Assisted  Dressing  Assisted  Toileting  Assisted  Feeding  Independent  Med Admin  Assisted  Med Delivery   whole    Wound Care Documentation and Therapy:  Wound 08/16/22 Buttocks Right; Inner intact pink (Active)   Dressing/Treatment Zinc paste 08/21/22 2159   Wound Assessment Pink/red 08/21/22 2159   Drainage Amount None 08/21/22 2159   Nelia-wound Assessment Intact 08/21/22 2159   Number of days: 5       Wound 08/16/22 Back Medial 45 staples, 2 sutures to upper left and right back (Active)   Wound Etiology Surgical 08/21/22 2159   Wound Cleansed Not Cleansed 08/21/22 2159   Dressing/Treatment Open to air 08/21/22 2159   Wound Assessment Pink/red 08/21/22 2159   Drainage Amount None 08/21/22 2159   Odor None 08/21/22 2159   Nelia-wound Assessment Intact 08/21/22 2159   Margins Attached edges 08/21/22 2159   Number of days: 5        Elimination:  Continence: Bowel: Yes  Bladder: Yes  Urinary Catheter: None   Colostomy/Ileostomy/Ileal Conduit: No       Date of Last BM: 9/01/2022    Intake/Output Summary (Last 24 hours) at 8/22/2022 1143  Last data filed at 8/21/2022 1644  Gross per 24 hour   Intake 240 ml   Output --   Net 240 ml     I/O last 3 completed shifts: In: 360 [P.O.:360]  Out: -     Safety Concerns:      At Risk for Falls    Impairments/Disabilities:      Vision and Contractures - back deformity    Nutrition Therapy:  Current Nutrition Therapy:   - Oral Diet:  General  - Oral Nutrition Supplement:  Standard  three times a day    Routes of Feeding: Oral  Liquids: Thin Liquids  Daily Fluid Restriction: no  Last Modified Barium Swallow with Video (Video Swallowing Test): not done    Treatments at the Time of Hospital Discharge:   Respiratory Treatments: nebulizer albuterol, inhaler  Oxygen Therapy:  is on oxygen at 4 L/min per nasal cannula.   Ventilator:    - No ventilator support    Rehab Therapies: Physical Therapy, Occupational Therapy, and Speech/Language Therapy  Weight Bearing Status/Restrictions: No weight bearing restrictions  Other Medical Equipment (for information only, NOT a DME order):  walker, bedside commode, and hospital bed  Other Treatments:     Patient's personal belongings (please select all that are sent with patient):  Glasses    RN SIGNATURE:  Electronically signed by Viktoria Cardona RN on 9/2/22 at 11:26 AM EDT    CASE MANAGEMENT/SOCIAL WORK SECTION    Inpatient Status Date: 08/16/2022    Readmission Risk Assessment Score:  Readmission Risk              Risk of Unplanned Readmission:  38       Discharging to Facility/ [address]   [phone]    DME- updated clinical/orders sent to [facility] 02 need, hospital bed   [employer] to provide rolling walker, shower chair, and bedside commode     ****Please call [facility] when arrive home [phone] to coordinate deliver of Oxygen****     / signature: Electronically signed by AZAEL Stewart on 9/2/22 at 8:53 AM EDT    PHYSICIAN SECTION    Prognosis: Good    Condition at Discharge: Stable    Rehab Potential (if transferring to Rehab): Good    Recommended Labs or Other Treatments After Discharge:   - Follow up with PCP, Pulmonology    Physician Certification: I certify the above information and transfer of Josep Meade  is necessary for the continuing treatment of the diagnosis listed and that she requires Home Care for less 30 days.      Update Admission H&P: No change in H&P    PHYSICIAN SIGNATURE:  Electronically signed by Shy Ma MD on 9/1/22 at 6:46 PM EDT

## 2022-08-22 NOTE — CARE COORDINATION
Clinicals faxed to insurance for .   Dang Taylor MPH, RRT  Clinical Liaison [address]  L)[phone] (G)[phone]   Electronically signed by Dang Taylor on 8/22/2022 at 9:21 AM

## 2022-08-22 NOTE — PLAN OF CARE
Problem: Pain  Goal: Verbalizes/displays adequate comfort level or baseline comfort level  8/21/2022 2201 by Grady Sutton RN  Outcome: Progressing  8/21/2022 0835 by Tavo Nguyen RN  Outcome: Progressing     Problem: Skin/Tissue Integrity  Goal: Absence of new skin breakdown  Description: 1. Monitor for areas of redness and/or skin breakdown  2. Assess vascular access sites hourly  3. Every 4-6 hours minimum:  Change oxygen saturation probe site  4. Every 4-6 hours:  If on nasal continuous positive airway pressure, respiratory therapy assess nares and determine need for appliance change or resting period.   8/21/2022 2201 by Grady Sutton RN  Outcome: Progressing  8/21/2022 0835 by Tavo Nguyen RN  Outcome: Progressing     Problem: Safety - Adult  Goal: Free from fall injury  8/21/2022 2201 by Grady Sutton RN  Outcome: Progressing  8/21/2022 0835 by Tavo Nguyen RN  Outcome: Progressing

## 2022-08-22 NOTE — PROGRESS NOTES
Occupational Therapy  Facility/Department: [facility] ARU  Rehabilitation Occupational Therapy Daily Treatment Note    Date: 22  Patient Name: Ailyn Francois       Room: [facility]0/1483-56  MRN: [id number]  Account: [de-identified]   : 1955  (73 y.o.) Gender: female                    Past Medical History:  has a past medical history of A-fib ([facility] [address] Anxiety, Cryptogenic organizing pneumonia ([facility] [address] Depression, GERD (gastroesophageal reflux disease), Hyperlipidemia, On home oxygen therapy, Rash, and Thyroid disease. Past Surgical History:   has a past surgical history that includes Cholecystectomy; bronchoscopy (N/A, 2019); bronchoscopy (2019); bronchoscopy (2019); bronchoscopy (2019); bronchoscopy (07/10/2019); bronchoscopy (N/A, 7/10/2019); Hysterectomy, total abdominal; bronchoscopy (Bilateral, 2019); bronchoscopy (N/A, 2019); bronchoscopy (N/A, 2019); and Arm Surgery (Left, 3/2/2020). Restrictions  Restrictions/Precautions: General Precautions; Fall Risk;Up as Tolerated  Spinal Precautions: TLSO when OOB, can jamila EOB; no lifting >10 lbs  Other position/activity restrictions: 3L O2, TLSO, spinal precautions, ok to shower. Subjective  Subjective: Pt supine in bed upon OT arrival. Pt unable to recall spinal precautions upon request. Pt reports feeling tired but agreeable to OT session. Restrictions/Precautions: General Precautions; Fall Risk;Up as Tolerated             Objective     Cognition  Overall Cognitive Status: Exceptions  Arousal/Alertness: Appropriate responses to stimuli  Following Commands: Follows all commands without difficulty; Follows multistep commands with repitition  Attention Span: Attends with cues to redirect  Memory: Appears intact  Safety Judgement: Decreased awareness of need for safety  Problem Solving: Assistance required to identify errors made;Assistance required to correct errors made;Assistance required to implement solutions;Assistance required to generate solutions  Insights: Decreased awareness of deficits  Initiation: Requires cues for some  Sequencing: Requires cues for some  Orientation  Overall Orientation Status: Within Normal Limits  Orientation Level: Oriented X4   Perception  Overall Perceptual Status: Impaired  Unilateral Attention: Appears intact  Initiation: Cues to initiate tasks  Motor Planning: Appears intact  Perseveration: Not present     ADL  Feeding  Assistance Level: Increased time to complete;Stand by assist;Set-up  Grooming/Oral Hygiene  Assistance Level: Verbal cues; Increased time to complete;Set-up; Contact guard assist  Upper Extremity Bathing  Assistance Level: Stand by assist  Skilled Clinical Factors: seated on shower bench, pt washes/dries 4/4 UB parts using [facility] and grab bar as needed. Pt requiring extra time. Lower Extremity Bathing  Equipment Provided: Long-handled sponge  Assistance Level: Contact guard assist;Verbal cues; Increased time to complete  Lower Extremity Dressing  Assistance Level: Minimal assistance;Verbal cues; Increased time to complete  Skilled Clinical Factors: Pt able to thread BLE into pants and underwear. Pt requiring full assistance to don/doff clothing over hips while in stance with RW and steadying assistance. Putting On/Taking Off Footwear  Assistance Level: Minimal assistance;Verbal cues; Increased time to complete  Skilled Clinical Factors: OT provides training on sockaid. Pt able to don 1/2 socks using sockaid, but requiring assistance with 2nd sock due to fatigue. Pt using reacher to doff socks with moderate cues. Toileting  Assistance Level: Increased time to complete;Verbal cues; Maximum assistance  Skilled Clinical Factors: assist with all tasks in stance with RW, modA for balance  Toilet Transfers  Technique: To the right; To the left;Stand step  Equipment: Beside commode  Additional Factors: Increased time to complete;Cues for hand placement; With handrails; Set-up; Verbal cues  Assistance Level: Moderate assistance;Verbal cues; Increased time to complete  Tub/Shower Transfers  Type: Shower  Transfer From: Wheelchair  Transfer To: Tub transfer bench  Additional Factors: Cues for hand placement;Verbal cues; Set-up; Increased time to complete; With handrails  Assistance Level: Moderate assistance;Maximum assistance  Skilled Clinical Factors: Mod A stand pivot w/c to shower bench using RW. Pt fatigued following bathing. Pt transfers from shower bench to w/c Max A stand pivot with grab bar          Functional Mobility  Device: Rolling walker; Wheelchair  Activity: Transport items; Retrieve items  Assistance Level: Moderate assistance  Bed Mobility  Overall Assistance Level: Minimal Assistance  Additional Factors: With handrails; Head of bed flat; Increased time to complete;Verbal cues  Bridging  Assistance Level: Minimal assistance  Roll Left  Assistance Level: Minimal assistance  Roll Right  Assistance Level: Minimal assistance  Sit to Supine  Assistance Level: Minimal assistance  Supine to Sit  Assistance Level: Minimal assistance  Scooting  Assistance Level: Minimal assistance  Transfers  Surface: To bed; Wheelchair  Additional Factors: Set-up; Verbal cues; Hand placement cues; Increased time to complete; With handrails  Device: Walker  Sit to Stand  Assistance Level: Moderate assistance;Minimal assistance;Maximum assistance  Skilled Clinical Factors: Pt requiring increased assistance with transfers as pt fatigues. Pt transfers from EOB Min A using RW. Pt transfers from w/c Mod A using RW. Pt transfers from shower bench Max A using grab bar. Stand to Sit  Assistance Level: Moderate assistance;Minimal assistance  Skilled Clinical Factors: Pt requiring Min-Mod A for stand to sit transfers. Bed To/From Chair  Technique: Stand pivot  Assistance Level: Minimal assistance  Skilled Clinical Factors: Min A EOB stand pivot to w/c. Stand Pivot  Assistance Level: Minimal assistance; Moderate assistance;Maximum assistance  Skilled Clinical Factors: Pt requiring increased assistance as pt fatigue during session. Min  stand pivot EOB to w/c using RW. Mod A w/c to shower bench using RW. Max A shower bench to w/c usin ggrab bar stand pivot. Max A w/c to EOB stand pivot. Assessment  Assessment  Assessment: Patient continues to be limited by pain and nausea needing encouragement for functioanl mobility and standing tolerance with cotx PT to facilitate independence and safety. Pt is progressing towards goals however does need consistent rest breaks, increased time, and cues for hand placmenet for mechanics. Pt was able to complete UE/LE bathing tasks sitting SPV-Valeria with LHS and cues. Pt continues to be x2A at times for sit <> Stand tranfsers and LB ADLs. Continues to progress with functional mobility distance, x28 ft this date but needs x6-7 minutes for seated recovery. O2 does drop to 80's with exertion needing up to 4L O2 and PLB to recover. Cont OT POC. Second session: Pt sleeping soundly in bed, hard to rouse with tactile and verbal cues. Pt c/o being cold and nausea but willing to complete BUE ex in bed only despite max encouragement/education regarding importance of OOB activities. Pt was able to complete 2x10 reps chest press, circles forwards/backwards, wrist flexion/extension, supination/pronation, elbow flexion/extension, shoulder flexion to 80* sitting EOB, PRN eating bites of lunch setup/SPV. Pt was able to tolerate 1# free weight, attempted with 2# but pt c/o pain therefore did not complete. Once again OT encouraged patient to sit in recliner after session complete however pt continues to decline. Cont OT POC. Activity Tolerance: Patient limited by pain; Patient limited by fatigue;Patient limited by endurance; Patient tolerated treatment well  Discharge Recommendations: 24 hour supervision or assist;Home with assist PRN;Home with nursing aide;Home with Home health OT;S Level 3  OT Equipment Goal 1: Pt will complete functional mobility in room/bathroom Rachael with LRAD for ADL management  Long Term Goal 2: Pt will complete IADL task standing vs sitting Rachael with LRAD  Long Term Goal 3: Pt will complete UE/LE bathing/dressing including donning/doffing brace (patient vs family education teachback) setup vs Rachael sitting vs standing with AE vs LRAD; ongoing Mod A UB dressing, Mod A LB dressing 8/20  Long Term Goal 4: Pt will complete toileting/transfer with LRAD and DME PRN Rachael        Therapy Time   Individual Concurrent Group Co-treatment   Time In 0900     0930   Time Out 0930     1000   Minutes 30     30   Timed Code Treatment Minutes: 60 Minutes     Second Session Therapy Time:   Individual Concurrent Group Co-treatment   Time In 1230        Time Out 1310        Minutes 40          Timed Code Treatment Minutes:  40 Minutes    Total Treatment Minutes:  [address], OTR/L

## 2022-08-22 NOTE — PROGRESS NOTES
Physical Therapy  Facility/Department: [facility]  Rehabilitation Physical Therapy Treatment Note    NAME: Kam Thomas  : 1955 (69 y.o.)  MRN: [id number]  CODE STATUS: Full Code    Date of Service: 22       Restrictions:  Restrictions/Precautions: General Precautions; Fall Risk;Up as Tolerated  Position Activity Restriction  Spinal Precautions: No Bending; No Twisting; No Lifting  Spinal Precautions: TLSO when OOB, can jamila EOB; no lifting >10 lbs  Other position/activity restrictions: 3L O2, TLSO, spinal precautions, ok to shower. SUBJECTIVE  Subjective  Subjective: Pt up in shower chair with OT on arrival, agreeable to PT tx  Pain: Pt reports abdominal, low back and R groin pain 6/10               OBJECTIVE  Cognition  Overall Cognitive Status: Exceptions  Arousal/Alertness: Appropriate responses to stimuli  Following Commands: Follows all commands without difficulty; Follows multistep commands with repitition  Attention Span: Attends with cues to redirect  Memory: Appears intact  Safety Judgement: Decreased awareness of need for safety  Problem Solving: Assistance required to identify errors made;Assistance required to correct errors made;Assistance required to implement solutions;Assistance required to generate solutions  Insights: Decreased awareness of deficits  Initiation: Requires cues for some  Sequencing: Requires cues for some  Orientation  Overall Orientation Status: Within Normal Limits  Orientation Level: Oriented X4    Functional Mobility  Balance  Sitting Balance: Stand by assistance  Standing Balance: Minimal assistance (Valeria to modA, requires increased assist with fatigue with RW)  Transfers  Surface: Wheelchair;From chair with arms  Additional Factors: Verbal cues; Hand placement cues; Increased time to complete  Device: Walker (RW)  Sit to Stand  Assistance Level: Minimal assistance; Requires x 2 assistance  Skilled Clinical Factors: Shower chair to RW, w/c to RW, cues for hand placement and anterior WS, increased time to complete  Stand to Sit  Assistance Level: Minimal assistance; Requires x 2 assistance  Skilled Clinical Factors: Shower chair to RW, w/c to RW, cues for hand placement and anterior WS, increased time to complete      Environmental Mobility  Ambulation  Surface: Level surface  Device: Rolling walker  Distance: 15' + 28' + 30'  Activity: Within Unit  Activity Comments: Distances limited by fatigue, weakness, decreased activity tolerance, requires encouragement to increase distances  Additional Factors: Verbal cues; Hand placement cues; Increased time to complete  Assistance Level: Minimal assistance; Moderate assistance  Gait Deviations: Slow mike;Decreased step length bilateral;Decreased weight shift bilateral;Decreased heel strike right;Decreased heel strike left; Unsteady gait; Wide base of support;Path deviations (Partial step through patern, B decreased hip extension in stance, B decreased hip/knee flexion in swing, slight forward flexed hips. Increasing knee flexion with fatigue and progression of gait)  Skilled Clinical Factors: Cues to increase abdominal activiation, increase TKE in stance and promote improved positioning and sequencing. ASSESSMENT/PROGRESS TOWARDS GOALS   /66  HR: 98  SpO2 94% on 3L O2 NC    Assessment  Assessment: Pt seen in am for PT/OT co-tx secondary to complexity of condition, decreased strength, balance, activity tolerance, increased pain and JACKELYN with mobility. Pt benefits from x 2 skilled hands to provide increased cues and feedback with mobility and improve safety and I. Pt requires Valeria x 2 for t/f, and Valeria/modA for gait multiple bouts up to 30' with RW. Pt requires seated rest between bouts of gait and cues for positioning/sequence/safety. Pt requires encouragement to increase gait distances and to safely get to chair.  Pt also requires cues for PLB following gait with SpO2 decreasing to 78% and HR elevated to 150 with gait requiring ~3 minutes seated rest to recover. Pt will benefit from continued skilled PT in ARU to address above deficits, will continue to progress mobility as tolerated. Activity Tolerance: Patient limited by pain; Patient limited by fatigue;Patient limited by endurance; Patient tolerated treatment well  Discharge Recommendations: 24 hour supervision or assist;Home with Home health PT  PT Equipment Recommendations  Other: TBD pending progress- pt has 4WW and w/c at home    Addendum: 2nd session:  Pt seen in am for second PT session with focus on functional t/f and standing tolerance with improved positioning. TLSO adjusted during session. Pt is limited by reports of pain and decreased activity tolerance. Pt requires grossly modA for t/f and Valeria for standing balance in // bars. Transfers:  SPT recliner to/from w/c with RW modA  Sit to/from stand w/c to // bars modA, x 3 reps  Pt requires increased time/attempts for all t/f, cues for hand placement and anterior WS as well as foot positioning. Limited by pain. Balance:  Pt completes x 3 bout static standing in // bars x 1-2 minutes each with grossly Valeria to CGA for balance once upright, cues to promote equal WS B, improve upright posture and knee extension. Time in standing limited by fatigue and SOB. W/c mobility:  Pt propels w/c x 5' over level tile with BLE with modA and increased time to complete, cues for sequence/technique, distances limited by fatigue.     Pt seated EOB with RN present, bed alarm in place and all needs within reach at end of session      Goals  Patient Goals   Patient goals : \"to be able to walk straight\"  Short Term Goals  Time Frame for Short term goals: 5 days- 8/20/22  Short term goal 1: Pt will complete bed mobility with SBA, with min cues for log roll technique -- 8/20: GOAL PARTIALLY MET SBA with bed rails and cues for log roll  Short term goal 2: Pt will complete functional transfers with CGA consistently, using LRAD -- 8/20: GOAL NOT MET Valeria x 2  Short term goal 3: Pt will ambulate 25ft with min A using RW -- 8/20: GOAL NOT MET Valeria x 2 25' with RW  Long Term Goals  Time Frame for Long term goals : 10 days- 8/26/22  Long term goal 1: Pt will complete bed mobility with mod I without cues for log roll technique  Long term goal 2: Pt will complete functional transfers with mod I using LRAD  Long term goal 3: Pt will ambulate 50ft with supervision using LRAD  Long term goal 4: Pt will complete car transfer with supervision using LRAD  Long term goal 5: Pt will verbalize 3/3 spinal precautions without cues and adhere to precautions with mobility without cues    PLAN OF CARE/SAFETY  Plan  Plan: 5-7 times per week  Plan weeks: 10 days  Specific Instructions for Next Treatment: Progress mobility as tolerated  Current Treatment Recommendations: Strengthening;Balance training;Functional mobility training;Transfer training; Endurance training; Wheelchair mobility training;Gait training;Stair training;Equipment evaluation, education, & procurement; Neuromuscular re-education;Home exercise program;Safety education & training;Patient/Caregiver education & training;Positioning  Safety Devices  Type of Devices: All fall risk precautions in place;Call light within reach; Patient at risk for falls;Nurse notified; Left in chair;Chair alarm in place;Gait belt (SLP present in room with pt at end of session)    EDUCATION  Education  Education Given To: Patient  Education Provided: Role of Therapy;Cognition;Plan of Care;Family Education;Mobility Training;Equipment;Precautions;Transfer Training; Safety; Energy Conservation; Fall Prevention Strategies;DME/Home Modifications  Education Provided Comments: Educated on importance of OOB mobility, spinal px and use of TLSO, educated in positioning, functional mobility and positional changes with back pain.   Education Method: Demonstration;Verbal  Barriers to Learning: None  Education Outcome: Verbalized

## 2022-08-22 NOTE — PROGRESS NOTES
cues   Did not target. Goal 4: Pt will complete respiratory retraining exercises to improve overall breath support with speech 10/10 given min cues     Respiratory retraining exercises:  -exhale while sustaining the sound /sh/: average of 3 secs  -exhale while sustaining the sound /f/: average of 2 secs  -exhale while sustaining the sound /z/: average of 2.5 secs  -diaphragmatic breathing: x20      Other areas targeted: N/A    Education:   Edu provided re: safe swallow strategies, respiratory retraining exercises      Safety Devices: [x] Call light within reach  [x] Chair alarm activated  [] Bed alarm activated  [] Other: [] Call light within reach  [] Chair alarm activated  [] Bed alarm activated  [] Other:    Assessment: Pt pleasant and cooperative, agreeable to participate. Pt with overall reduced endurance throughout tx session, requiring self directed rest breaks. Pt has met all dysphagia goals, tolerating IDDSI Level 7 regular solids with thin liquids, meds whole with water one pill at a time as LRD. Pt completed respiratory retraining exercises with average sustained phonation of 2-3 secs. Pt benefited from cues for increased deep breathing. Pt is making good progress towards goals, continue goals above. Plan: Continue as per plan of care. Additional Information:     Barriers toward progress: Decreased endurance  Discharge recommendations:  [] Home independently  [] Home with assistance []  24 hour supervision  [] ECF [x] Other: See PT/OT notes   Continued Tx Upon Discharge: ? [] Yes [] No [x] TBD based on progress while on ARU [] Vital Stim indicated [] Other:   Estimated discharge date: 08/26/22    Interventions used this date:  [x] Speech/Language Treatment  [] Instruction in HEP [] Group [x] Dysphagia Treatment [] Cognitive Treatment   [] Other:       Total Time Breakdown / Charges    Time in Time out Total Time / units   Cognitive Tx -- -- --   Speech Tx 1010 1030 20 min/ 1 unit    Dysphagia Tx 1000 1010 10 min/ 1 unit        Electronically Signed by     Jan Villa. A CCC-SLP  Speech-Language Pathologist  [id number]

## 2022-08-23 LAB
ANION GAP SERPL CALCULATED.3IONS-SCNC: 8 MMOL/L (ref 3–16)
BUN BLDV-MCNC: 10 MG/DL (ref 7–20)
CALCIUM SERPL-MCNC: 9 MG/DL (ref 8.3–10.6)
CHLORIDE BLD-SCNC: 98 MMOL/L (ref 99–110)
CO2: 30 MMOL/L (ref 21–32)
CREAT SERPL-MCNC: <0.5 MG/DL (ref 0.6–1.2)
GFR AFRICAN AMERICAN: >60
GFR NON-AFRICAN AMERICAN: >60
GLUCOSE BLD-MCNC: 124 MG/DL (ref 70–99)
GLUCOSE BLD-MCNC: 124 MG/DL (ref 70–99)
MAGNESIUM: 1.6 MG/DL (ref 1.8–2.4)
PERFORMED ON: ABNORMAL
POTASSIUM SERPL-SCNC: 3.6 MMOL/L (ref 3.5–5.1)
SODIUM BLD-SCNC: 136 MMOL/L (ref 136–145)

## 2022-08-23 PROCEDURE — 97116 GAIT TRAINING THERAPY: CPT

## 2022-08-23 PROCEDURE — 97530 THERAPEUTIC ACTIVITIES: CPT

## 2022-08-23 PROCEDURE — 80048 BASIC METABOLIC PNL TOTAL CA: CPT

## 2022-08-23 PROCEDURE — 97535 SELF CARE MNGMENT TRAINING: CPT

## 2022-08-23 PROCEDURE — 36415 COLL VENOUS BLD VENIPUNCTURE: CPT

## 2022-08-23 PROCEDURE — 92507 TX SP LANG VOICE COMM INDIV: CPT

## 2022-08-23 PROCEDURE — 97110 THERAPEUTIC EXERCISES: CPT

## 2022-08-23 PROCEDURE — 94761 N-INVAS EAR/PLS OXIMETRY MLT: CPT

## 2022-08-23 PROCEDURE — 6370000000 HC RX 637 (ALT 250 FOR IP): Performed by: STUDENT IN AN ORGANIZED HEALTH CARE EDUCATION/TRAINING PROGRAM

## 2022-08-23 PROCEDURE — 83735 ASSAY OF MAGNESIUM: CPT

## 2022-08-23 PROCEDURE — 6360000002 HC RX W HCPCS: Performed by: STUDENT IN AN ORGANIZED HEALTH CARE EDUCATION/TRAINING PROGRAM

## 2022-08-23 PROCEDURE — 97112 NEUROMUSCULAR REEDUCATION: CPT

## 2022-08-23 PROCEDURE — 94640 AIRWAY INHALATION TREATMENT: CPT

## 2022-08-23 PROCEDURE — 97129 THER IVNTJ 1ST 15 MIN: CPT

## 2022-08-23 PROCEDURE — 2700000000 HC OXYGEN THERAPY PER DAY

## 2022-08-23 PROCEDURE — 1280000000 HC REHAB R&B

## 2022-08-23 RX ORDER — POTASSIUM CHLORIDE 20 MEQ/1
20 TABLET, EXTENDED RELEASE ORAL 2 TIMES DAILY
Status: DISCONTINUED | OUTPATIENT
Start: 2022-08-23 | End: 2022-08-24

## 2022-08-23 RX ADMIN — HEPARIN SODIUM 5000 UNITS: 5000 INJECTION INTRAVENOUS; SUBCUTANEOUS at 20:36

## 2022-08-23 RX ADMIN — OXYCODONE AND ACETAMINOPHEN 1 TABLET: 5; 325 TABLET ORAL at 13:31

## 2022-08-23 RX ADMIN — OXYCODONE AND ACETAMINOPHEN 2 TABLET: 5; 325 TABLET ORAL at 20:35

## 2022-08-23 RX ADMIN — NICOTINE POLACRILEX 20 MG: 4 GUM, CHEWING ORAL at 06:25

## 2022-08-23 RX ADMIN — PIRFENIDONE 801 MG: 801 TABLET, FILM COATED ORAL at 16:19

## 2022-08-23 RX ADMIN — IPRATROPIUM BROMIDE AND ALBUTEROL SULFATE 1 AMPULE: 2.5; .5 SOLUTION RESPIRATORY (INHALATION) at 20:45

## 2022-08-23 RX ADMIN — HEPARIN SODIUM 5000 UNITS: 5000 INJECTION INTRAVENOUS; SUBCUTANEOUS at 13:31

## 2022-08-23 RX ADMIN — IPRATROPIUM BROMIDE AND ALBUTEROL SULFATE 1 AMPULE: 2.5; .5 SOLUTION RESPIRATORY (INHALATION) at 12:13

## 2022-08-23 RX ADMIN — ATORVASTATIN CALCIUM 40 MG: 40 TABLET, FILM COATED ORAL at 11:55

## 2022-08-23 RX ADMIN — SERTRALINE HYDROCHLORIDE 200 MG: 50 TABLET ORAL at 08:01

## 2022-08-23 RX ADMIN — METHOCARBAMOL TABLETS 750 MG: 750 TABLET, COATED ORAL at 20:35

## 2022-08-23 RX ADMIN — NADOLOL 20 MG: 20 TABLET ORAL at 11:55

## 2022-08-23 RX ADMIN — PANTOPRAZOLE SODIUM 40 MG: 40 TABLET, DELAYED RELEASE ORAL at 06:17

## 2022-08-23 RX ADMIN — Medication 400 MG: at 11:55

## 2022-08-23 RX ADMIN — CETIRIZINE HYDROCHLORIDE 10 MG: 10 TABLET, FILM COATED ORAL at 08:02

## 2022-08-23 RX ADMIN — IPRATROPIUM BROMIDE AND ALBUTEROL SULFATE 1 AMPULE: 2.5; .5 SOLUTION RESPIRATORY (INHALATION) at 07:48

## 2022-08-23 RX ADMIN — LEVOTHYROXINE SODIUM 125 MCG: 0.12 TABLET ORAL at 06:17

## 2022-08-23 RX ADMIN — METHOCARBAMOL TABLETS 750 MG: 750 TABLET, COATED ORAL at 08:03

## 2022-08-23 RX ADMIN — METHOCARBAMOL TABLETS 750 MG: 750 TABLET, COATED ORAL at 13:31

## 2022-08-23 RX ADMIN — OXYCODONE AND ACETAMINOPHEN 1 TABLET: 5; 325 TABLET ORAL at 06:25

## 2022-08-23 RX ADMIN — HEPARIN SODIUM 5000 UNITS: 5000 INJECTION INTRAVENOUS; SUBCUTANEOUS at 06:17

## 2022-08-23 RX ADMIN — OXYCODONE AND ACETAMINOPHEN 1 TABLET: 5; 325 TABLET ORAL at 06:37

## 2022-08-23 RX ADMIN — MIRTAZAPINE 30 MG: 15 TABLET, FILM COATED ORAL at 20:36

## 2022-08-23 ASSESSMENT — PAIN DESCRIPTION - DESCRIPTORS
DESCRIPTORS: ACHING;SORE;DISCOMFORT
DESCRIPTORS: ACHING;DISCOMFORT;SORE;SPASM

## 2022-08-23 ASSESSMENT — PAIN DESCRIPTION - ONSET
ONSET: ON-GOING
ONSET: ON-GOING

## 2022-08-23 ASSESSMENT — PAIN DESCRIPTION - PAIN TYPE
TYPE: ACUTE PAIN;SURGICAL PAIN
TYPE: ACUTE PAIN;SURGICAL PAIN

## 2022-08-23 ASSESSMENT — PAIN - FUNCTIONAL ASSESSMENT
PAIN_FUNCTIONAL_ASSESSMENT: PREVENTS OR INTERFERES SOME ACTIVE ACTIVITIES AND ADLS
PAIN_FUNCTIONAL_ASSESSMENT: PREVENTS OR INTERFERES SOME ACTIVE ACTIVITIES AND ADLS

## 2022-08-23 ASSESSMENT — PAIN DESCRIPTION - FREQUENCY
FREQUENCY: CONTINUOUS
FREQUENCY: CONTINUOUS

## 2022-08-23 ASSESSMENT — PAIN DESCRIPTION - LOCATION
LOCATION: BACK
LOCATION: BACK
LOCATION: GROIN;BACK
LOCATION: BACK

## 2022-08-23 ASSESSMENT — PAIN DESCRIPTION - ORIENTATION
ORIENTATION: LOWER
ORIENTATION: LOWER;MID
ORIENTATION: MID;LOWER
ORIENTATION: LOWER;LEFT

## 2022-08-23 ASSESSMENT — PAIN SCALES - GENERAL
PAINLEVEL_OUTOF10: 6
PAINLEVEL_OUTOF10: 5
PAINLEVEL_OUTOF10: 4
PAINLEVEL_OUTOF10: 3
PAINLEVEL_OUTOF10: 4
PAINLEVEL_OUTOF10: 8
PAINLEVEL_OUTOF10: 8
PAINLEVEL_OUTOF10: 0
PAINLEVEL_OUTOF10: 5
PAINLEVEL_OUTOF10: 7

## 2022-08-23 NOTE — PLAN OF CARE
Patient remains free from falls and injuries. Patient up with staff assistance to use the bathroom. Call light within reach. Patient has non skid footwear on. Will continue to monitor and assess patient's safety awareness.  Inessa Wisdom RN

## 2022-08-23 NOTE — PROGRESS NOTES
complete. As pt became more fatigued required extra assistance to perform sit to stand transfers. Stand Pivot  Assistance Level: Contact guard assist    Environmental Mobility  Ambulation  Surface: Level surface  Device: Rolling walker  Distance: 30'+30'  Activity: Within Unit  Activity Comments: Distances limited by fatigue, weakness, decreased activity tolerance, requires encouragement to increase distances  Additional Factors: Verbal cues; Hand placement cues; Increased time to complete  Assistance Level: Contact guard assist  Gait Deviations: Slow mike;Decreased step length bilateral;Decreased weight shift bilateral;Decreased heel strike right;Decreased heel strike left; Unsteady gait; Wide base of support;Path deviations (pt demonstrates decreased terminal knee extension in stance and during ambulation. )    PT Exercises  Exercise Treatment: Seated BLE exercises: LAQ x 15, seated hamstring stretch 4X30 seconds to RLE. Static Sitting Balance Exercises: Pt sat EOB x 5 mins while taking medications from RN and while finishing breakfast-- grossly SBA for balance with unilateral to BUE support on EOB  Static Standing Balance Exercises: pt performed weight shifting X10 laterally 2 sets on BLE with CGA/MIN A, required prolonged rest breaks in between. pt performed forward weight shifts in preperation for gait X10 on BLE with CGA/MIN A with VC's to land in heelstrike and keep knees extended during activity. ASSESSMENT/PROGRESS TOWARDS GOALS  Vital Signs  Comment: 3L O2 NC, pt O2 sats would drop to low 80's with any activity but when the pt would take a rest break and breathe deeply O2 sats would return to >93%. Assessment  Assessment: pt foud at EOB sitting and taking pills with RN this AM.  pt agreeable to PT.  pt demonstrates lack of terminal knee extension in gait and standing.   pt very short of breath with all activity and requires prolonged rest breaks 3-4 minutes after each activity to regain her breath and feel up to more activity. pt O2 sats dropped to low 80's with all bouts of exercises and standing on today's date and required rest breaks to bring O2 sats back up.  pt demonstrates decreased activity tolerance and functional capacity at this time, as well as decreased strength. pt would continue to benefit from skilled PT to address the above stated deficits. Activity Tolerance: Patient limited by fatigue;Patient limited by endurance; Patient tolerated treatment well  Discharge Recommendations: 24 hour supervision or assist;Home with Home health PT  PT Equipment Recommendations  Other: TBD pending progress- pt has 4WW and w/c at home    Goals  Patient Goals   Patient goals : \"to be able to walk straight\"  Short Term Goals  Time Frame for Short term goals: 5 days- 8/20/22  Short term goal 1: Pt will complete bed mobility with SBA, with min cues for log roll technique -- 8/20: GOAL PARTIALLY MET SBA with bed rails and cues for log roll  Short term goal 2: Pt will complete functional transfers with CGA consistently, using LRAD -- 8/23: GOAL PARTIALLY MET pt can transfer at [facility], but after an hour of PT requires MIN A due to fatigue.   Short term goal 3: Pt will ambulate 25ft with min A using RW -- 8/23 GOAL MET  Long Term Goals  Time Frame for Long term goals : 10 days- 8/26/22  Long term goal 1: Pt will complete bed mobility with mod I without cues for log roll technique  Long term goal 2: Pt will complete functional transfers with mod I using LRAD  Long term goal 3: Pt will ambulate 50ft with supervision using LRAD  Long term goal 4: Pt will complete car transfer with supervision using LRAD  Long term goal 5: Pt will verbalize 3/3 spinal precautions without cues and adhere to precautions with mobility without cues    PLAN OF CARE/SAFETY  Plan  Plan: 5-7 times per week  Plan weeks: 10 days  Specific Instructions for Next Treatment: Progress mobility as tolerated  Current Treatment Recommendations: Strengthening;Balance training;Functional mobility training;Transfer training; Endurance training; Wheelchair mobility training;Gait training;Stair training;Equipment evaluation, education, & procurement; Neuromuscular re-education;Home exercise program;Safety education & training;Patient/Caregiver education & training;Positioning  Safety Devices  Type of Devices: All fall risk precautions in place;Call light within reach; Patient at risk for falls;Nurse notified; Left in chair;Chair alarm in place;Gait belt    EDUCATION  Education  Education Given To: Patient  Education Provided: Role of Therapy;Cognition;Plan of Care;Family Education;Mobility Training;Equipment;Precautions;Transfer Training; Safety; Energy Conservation; Fall Prevention Strategies;DME/Home Modifications  Education Provided Comments: Educated on importance of OOB mobility, spinal px and use of TLSO, educated in positioning, functional mobility and positional changes with back pain.   Education Method: Demonstration;Verbal  Barriers to Learning: None  Education Outcome: Verbalized understanding;Demonstrated understanding;Continued education needed        Therapy Time   Individual Concurrent Group Co-treatment   Time In 0800         Time Out 0900         Minutes 60           Timed Code Treatment Minutes: Gagan Nicole, PT, 08/23/22 at 10:22 AM

## 2022-08-23 NOTE — PROGRESS NOTES
Desire Alejo  8/23/2022  [id number]    Chief Complaint: H/O Spinal surgery    Subjective:   No acute events overnight. Today Lyndsay Eden is seen in the gym, working with therapy. She reports feeling well. She did get her breathing treatment this morning. She was able to eat breakfast. She continues to have right hip pain. ROS: denies f/c, n/v, cp, sob    Objective:  Patient Vitals for the past 24 hrs:   BP Temp Temp src Pulse Resp SpO2 Weight   08/23/22 0940 137/76 -- -- 98 -- 98 % --   08/23/22 0750 134/73 97.3 °F (36.3 °C) Oral (!) 103 16 99 % --   08/23/22 0637 -- -- -- -- -- -- 128 lb 12 oz (58.4 kg)   08/22/22 2105 -- -- -- 96 18 92 % --   08/22/22 2036 130/65 98.3 °F (36.8 °C) Oral 97 18 91 % --   08/22/22 1321 -- -- -- -- 16 -- --     Gen: No distress, pleasant. Seated in wheelchair  HEENT: Normocephalic, atraumatic. CV: RRR  Resp: No respiratory distress. Lungs CTAB  Abd: Soft, nontender   Ext: No edema. Neuro: Alert, oriented, appropriately interactive. Speech fluent without aphasia  Psych: appropriate mood and affect    Wt Readings from Last 3 Encounters:   08/23/22 128 lb 12 oz (58.4 kg)   08/08/22 132 lb (59.9 kg)   08/06/22 132 lb (59.9 kg)       Laboratory data:   Lab Results   Component Value Date    WBC 8.6 08/22/2022    HGB 8.8 (L) 08/22/2022    HCT 27.8 (L) 08/22/2022    MCV 85.9 08/22/2022     08/22/2022       Lab Results   Component Value Date/Time     08/22/2022 06:38 AM    K 3.3 08/22/2022 06:38 AM     08/22/2022 06:38 AM    CO2 30 08/22/2022 06:38 AM    BUN 7 08/22/2022 06:38 AM    CREATININE <0.5 08/22/2022 06:38 AM    GLUCOSE 125 08/22/2022 06:38 AM    CALCIUM 7.7 08/22/2022 06:38 AM        Therapy progress:  PT  Position Activity Restriction  Spinal Precautions: No Bending, No Twisting, No Lifting  Spinal Precautions: TLSO when OOB, can jamila EOB; no lifting >10 lbs  Other position/activity restrictions: 3L O2, TLSO, spinal precautions, ok to shower.   Objective Sit to Stand: Minimal Assistance (up to RW with increased time)  Stand to sit: Minimal Assistance  Bed to Chair: Minimal assistance, Moderate assistance (using RW via stand pivot transfer)     OT  PT Equipment Recommendations  Other: TBD pending progress- pt has 4WW and w/c at home  Toilet - Technique: Stand step, To left, To right (grab bar vs RW)  Equipment Used: Standard toilet  Toilet Transfers Comments: assist with lifting and lowering, would benefit from [facility] over toilet with RW vs grab bar for support  Assessment        SLP                Body mass index is 23.55 kg/m². Assessment and Plan: Jag Elias is a 69year old female with a past medical history significant for chronic respiratory failure on 3 L home O2, ILD, COPD, paroxysmal atrial fibrillation, DM2, HTN, HLD, and thyroid disease who presented to  on 8/11/22 for planned T11-L3 PDFF with L1 corpectomy for L1 burst fracture. She was admitted to [facility] on 8/16/22 due to functional deficits below her baseline. L1 Burst Fracture  - s/p T11-L3 PDFF with L1 corpectomy  - TLSO when OOB  - pain control  - remove staples 8/25  - incision care  - PT, OT     Chronic Respiratory Failure  - on 3 L home O2 at baseline  - wean oxygen for goal SpO2>88%  - humidified air     Hypokalemia  - increased replacement, patient has only been taking intermittently due to upsetting her stomach  - obtain BMP today. May need to consider IV if patient is unable to tolerate PO     ILD/COPD  - pifenidone TID  - scheduled duo-nebs  - follows with [facility]     pAF  - not been on [facility]  - monitor     DM2  - on januvia and jardiance at home, family brought in home meds  - glucose has been low due to poor PO intake, hold off on resuming home meds     Hypothyroidism  - synthroid     GERD  - PPI  - PRN omeprazole per home regimen     Anxiety/Depression  - zoloft, mirtazipine     HLD  - statin      Bowels: Schedule stool softener. Follow bowel movements.  Enema or suppository if needed. Bladder: Check PVR x 3. [address] if PVR > 200ml or if any volume is > 500 ml. Sleep: Trazodone provided prn. Follow up appointments: [facility], PCP, Dann Alarcon MD 8/23/2022, 9:42 AM

## 2022-08-23 NOTE — PROGRESS NOTES
Speech Language Pathology  [facility]: ACUTE REHAB UNIT  SPEECH-LANGUAGE PATHOLOGY      [x] Daily  [] Weekly Care Conference Note  [] Discharge    Jasiel Still      :1955  [facility]:[id number]  Rehab Dx/Hx: H/O Spinal surgery [Z98.890]    Precautions: falls  Home situation: lives with , dtr manages meds, shares finances with ,  does the cooking, cleaning, laundry, yardwork, not an active    ST Dx: [] Aphasia  [] Dysarthria  [] Apraxia   [x] Oropharyngeal dysphagia [x] Cognitive Impairment  [] Other:   Date of Admit: 2022  Room #: 0162/0162-01    Current functional status (updated daily):         Pt being seen for : [] Speech/Language Treatment  [x] Dysphagia Treatment [x] Cognitive Treatment  [] Other:  Communication: [x]WFL  [] Aphasia  [] Dysarthria  [] Apraxia  [] Pragmatic Impairment [] Non-verbal  [] Hearing Loss  [] Other:   Cognition: [] WFL  [x] Mild  [] Moderate  [] Severe [] Unable to Assess  [] Other:  Memory: [] WFL  [x] Mild  [] Moderate  [] Severe [] Unable to Assess  [] Other:  Behavior: [x] Alert  [x] Cooperative  [x]  Pleasant  [] Confused  [] Agitated  [] Uncooperative  [] Distractible [] Motivated  [] Self-Limiting [] Anxious  [] Other:  Endurance:  [x] Adequate for participation in SLP sessions  [] Reduced overall  [] Lethargic  [] Other:  Safety: [x] No concerns at this time  [] Reduced insight into deficits  []  Reduced safety awareness [] Not following call light procedures  [] Unable to Assess  [] Other:    Current Diet Order:ADULT DIET;  Regular  ADULT ORAL NUTRITION SUPPLEMENT; Breakfast, Lunch, Dinner; Standard High Calorie/High Protein Oral Supplement  Swallowing Precautions: upright for all intake, stay upright for at least 30 mins after intake, small bites/sips, alternate bites/sips, slow rate of intake, general GERD precautions, and general aspiration precautions        Date: 2022      Tx session 1  0730-3644 Tx session 2  All tx needs met in session 1    Total Timed Code Min 0    Total Treatment Minutes 30    Individual Treatment Minutes 30    Group Treatment Minutes 0    Co-Treat Minutes 0    Variance/Reason:  0    Pain 4 leg/hip pain    Pain Intervention [] RN notified  [] Repositioned  [x] Intervention offered and patient declined  [] N/A  [] Other:   [] RN notified  [] Repositioned  [] Intervention offered and patient declined  [] N/A  [] Other:   Subjective     Pt alert and oriented, cooperative and agreeable to participate in therapy. Pt seen sitting upright in bedside chair, appeared to be in better spirits today. Objective:  Goals           Goal met 08/22/22. Pt tolerating IDDSI level 7 regular solids, thin liquids, meds whole with water 1 pill at a time as LRD. Goal met 08/22/22. Cog Goals:  Short-term Goals  Timeframe for Short-term *goals extended through 08/26/22*    Goal 1: Pt will complete graded recall tasks using compensatory strategies with 90% acc given min cues    Functional recall/mental manipulation ranking task:  -pt given a set of three words and asked to rank them given a specific direction  -ex: antionette, plane, fly (rank from smallest to largest)  -recall: 90% acc indep    Goal 2: Pt will complete executive function tasks (e.g. money, time, etc) with 90% acc given min cues      Did not target.      Goal 3: Pt will complete higher level critical thinking tasks with 90% acc given min cues   Thought organization/ranking task:  -associated with mental manipulation/ranking (see goal 1 above)  -100% acc indep      Goal 4: Pt will complete respiratory retraining exercises to improve overall breath support with speech 10/10 given min cues     Respiratory retraining exercises:  -exhale while sustaining the sound /sh/: x10  -exhale while sustaining the sound /f/: x10  -exhale while sustaining the sound /z/: x10  -diaphragmatic breathing: x10      Other areas targeted: N/A    Education:   Edu provided re: compensatory memory strategies, respiratory retraining exercises      Safety Devices: [x] Call light within reach  [x] Chair alarm activated  [] Bed alarm activated  [] Other: [] Call light within reach  [] Chair alarm activated  [] Bed alarm activated  [] Other:    Assessment: Pt pleasant and cooperative, agreeable to participate. Pt appeared to be in better spirits today, stating overall she felt really good today. Pt completed respiratory retraining exercises x10 each, with occasional min cues required. Pt completed higher level recall/thought organization task with % acc indep. Pt is making good progress towards goals and remains motivated to improve. Continue goals above. Plan: Continue as per plan of care. Additional Information:     Barriers toward progress: Decreased endurance  Discharge recommendations:  [] Home independently  [] Home with assistance []  24 hour supervision  [] ECF [x] Other: See PT/OT notes   Continued Tx Upon Discharge: ? [] Yes [] No [x] TBD based on progress while on ARU [] Vital Stim indicated [] Other:   Estimated discharge date: 08/26/22    Interventions used this date:  [x] Speech/Language Treatment  [] Instruction in HEP [] Group [x] Dysphagia Treatment [] Cognitive Treatment   [] Other: Total Time Breakdown / Charges    Time in Time out Total Time / units   Cognitive Tx -- -- --   Speech Tx 1440 1500 20 min/ 1 unit    Dysphagia Tx 1500 1510 10 min/ 1 unit        Electronically Signed by     Geo Perkins. A CCC-SLP  Speech-Language Pathologist  [id number]

## 2022-08-23 NOTE — PROGRESS NOTES
Comprehensive Nutrition Assessment    Type and Reason for Visit:  Reassess    Nutrition Recommendations/Plan:   Continue general diet   Continue Ensure with meals   Encourage PO intakes for healing and increased therapy   Monitor nutrition adequacy, pertinent labs, bowel habits, wt changes, and clinical progress     Malnutrition Assessment:  Malnutrition Status:  Severe malnutrition (08/17/22 1341)    Context:  Chronic Illness     Findings of the 6 clinical characteristics of malnutrition:  Energy Intake:  75% or less estimated energy requirements for 1 month or longer  Weight Loss:  Greater than 20% over 1 year     Muscle Mass Loss:  Severe muscle mass loss Temples (temporalis), Clavicles (pectoralis & deltoids), Hand (interosseous)    Nutrition Assessment:    Follow up: Pt remains nutritionally at risk AEB variable PO intakes. Pt reports appetite better today. Denies any issues with gagging or nausea today. States has been drinking ONS in the middle of the night when she gets hungry. Encouraged PO intakes for healing. No further nutrition questions or concerns at this time. Will monitor. Nutrition Related Findings:    BM x2 on 8/22. Active BS. Trace BLE edema. K 3.3. Mg 1.60. Wound Type: Pressure Injury, Stage II, Surgical Incision       Current Nutrition Intake & Therapies:    Average Meal Intake: 0%, 1-25%, 26-50%, 51-75%  Average Supplements Intake: 0%, %  ADULT DIET; Regular  ADULT ORAL NUTRITION SUPPLEMENT; Breakfast, Lunch, Dinner; Standard High Calorie/High Protein Oral Supplement    Anthropometric Measures:  Height: 5' 2\" (157.5 cm)  Ideal Body Weight (IBW): 110 lbs (50 kg)    Admission Body Weight: 138 lb (62.6 kg) (bed scale)  Current Body Weight: 128 lb (58.1 kg), 125.5 % IBW.  Weight Source: Bed Scale  Current BMI (kg/m2): 23.4  Usual Body Weight: 174 lb (78.9 kg) (bed scale 1/24/22)  % Weight Change (Calculated): -20.7  Weight Adjustment For: No Adjustment                 BMI Categories: Overweight (BMI 25.0-29. 9)    Estimated Daily Nutrient Needs:  Energy Requirements Based On: Kcal/kg (25-30)  Weight Used for Energy Requirements: Current  Energy (kcal/day): 0061-4529 kcal  Weight Used for Protein Requirements: Current (1.2-1.5 g/kg)  Protein (g/day): 74-93 g  Method Used for Fluid Requirements: 1 ml/kcal  Fluid (ml/day): 7102-9700 mL    Nutrition Diagnosis:   Severe malnutrition related to inadequate protein-energy intake as evidenced by poor intake prior to admission, weight loss, severe muscle loss    Nutrition Interventions:   Food and/or Nutrient Delivery: Continue Current Diet, Continue Oral Nutrition Supplement  Nutrition Education/Counseling: No recommendation at this time  Coordination of Nutrition Care: Continue to monitor while inpatient, Interdisciplinary Rounds  Plan of Care discussed with: Patient and family    Goals:  Previous Goal Met: Progressing toward Goal(s)  Goals: PO intake 50% or greater, prior to discharge       Nutrition Monitoring and Evaluation:   Behavioral-Environmental Outcomes: None Identified  Food/Nutrient Intake Outcomes: Food and Nutrient Intake, Supplement Intake, Diet Advancement/Tolerance  Physical Signs/Symptoms Outcomes: Biochemical Data, Nutrition Focused Physical Findings, Weight, Meal Time Behavior    Discharge Planning:    Continue current diet, Continue Oral Nutrition Supplement     Brayan Panda MS, RD, LD  Contact: [phone]

## 2022-08-23 NOTE — PROGRESS NOTES
Occupational Therapy  Facility/Department: [address] [place] [address]  Rehabilitation Occupational Therapy Daily Treatment Note    Date: 22  Patient Name: Shant Galvan       Room: 2873/9983-11  MRN: [id number]  Account: [de-identified]   : 1955  (73 y.o.) Gender: female                    Past Medical History:  has a past medical history of A-fib ([facility] [address] Anxiety, Cryptogenic organizing pneumonia ([facility] [address] Depression, GERD (gastroesophageal reflux disease), Hyperlipidemia, On home oxygen therapy, Rash, and Thyroid disease. Past Surgical History:   has a past surgical history that includes Cholecystectomy; bronchoscopy (N/A, 2019); bronchoscopy (2019); bronchoscopy (2019); bronchoscopy (2019); bronchoscopy (07/10/2019); bronchoscopy (N/A, 7/10/2019); Hysterectomy, total abdominal; bronchoscopy (Bilateral, 2019); bronchoscopy (N/A, 2019); bronchoscopy (N/A, 2019); and Arm Surgery (Left, 3/2/2020). Restrictions  Restrictions/Precautions: General Precautions; Fall Risk;Up as Tolerated  Spinal Precautions: TLSO when OOB, can jamila EOB; no lifting >10 lbs  Other position/activity restrictions: 3L O2, TLSO, spinal precautions, ok to shower. Subjective  Subjective: Pt in recliner, reports 4/10 pain. Requests to change clothing. Agreeable and pleasant, vitals WFL. Pt on 3 L O2  Restrictions/Precautions: General Precautions; Fall Risk;Up as Tolerated             Objective     Cognition  Overall Cognitive Status: Exceptions  Arousal/Alertness: Appropriate responses to stimuli  Following Commands: Follows all commands without difficulty; Follows multistep commands with repitition  Attention Span: Attends with cues to redirect  Memory: Appears intact  Safety Judgement: Decreased awareness of need for safety  Problem Solving: Assistance required to identify errors made;Assistance required to correct errors made;Assistance required to implement solutions;Assistance required to generate solutions  Insights: Decreased awareness of deficits  Initiation: Requires cues for some  Sequencing: Requires cues for some  Orientation  Overall Orientation Status: Within Normal Limits  Orientation Level: Oriented X4   Perception  Unilateral Attention: Appears intact  Initiation: Cues to initiate tasks  Motor Planning: Appears intact  Perseveration: Not present     ADL  Feeding  Assistance Level: Increased time to complete;Stand by assist;Set-up  Grooming/Oral Hygiene  Assistance Level: Verbal cues; Increased time to complete;Set-up; Contact guard assist  Skilled Clinical Factors: Min A seated in w/c at sink. Pt brushes teeth with set up assist. Pt mejia hair with partial assistance due to fatigue. Upper Extremity Dressing  Assistance Level: Increased time to complete;Verbal cues; Moderate assistance  Skilled Clinical Factors: modA to jamila/doff brace, T shirt and manage O2/lines. Pt completes while seated with max cues for TLSO management. Pt requiring assistance to don TLSO straps. Lower Extremity Dressing  Assistance Level: Minimal assistance;Verbal cues; Increased time to complete  Skilled Clinical Factors: Pt able to thread BLE into pants and underwear. Pt requiring full assistance to don/doff clothing over hips while in stance with RW and steadying assistance. Putting On/Taking Off Footwear  Assistance Level: Minimal assistance;Verbal cues; Increased time to complete  Skilled Clinical Factors: OT provides training on sockaid. Pt able to don 1/2 socks using sockaid, but requiring assistance with 2nd sock due to fatigue. Pt using reacher to doff socks with moderate cues. Toileting  Assistance Level: Increased time to complete;Verbal cues; Minimal assistance  Skilled Clinical Factors: Valeria for standing balance with RW vs grab bar  Toilet Transfers  Technique: Stand step; To the left; To the right (RW)  Equipment: Beside commode  Additional Factors: Increased time to complete;Cues for hand placement; With handrails; Set-up; Verbal cues  Assistance Level: Verbal cues; Increased time to complete;Minimal assistance  Skilled Clinical Factors: Valeria with increased time and PRN cues          Functional Mobility  Device: Rolling walker  Activity: Transport items; Retrieve items  Assistance Level: Minimal assistance  Skilled Clinical Factors: Valeria x2 sit <> stand from various surfaces, Valeria with O2 line management chair to [facility] to sinkside to chair with RW and cues for turning  Supine to Sit  Assistance Level: Minimal assistance  Scooting  Assistance Level: Minimal assistance  Transfers  Surface: To chair with arms; Bedside commode;From chair with arms; Wheelchair  Additional Factors: Set-up; Verbal cues; Hand placement cues; Increased time to complete; With handrails  Device: Walker  Sit to Stand  Assistance Level: Minimal assistance  Skilled Clinical Factors: Pt requiring increased assistance with transfers as pt fatigues. Pt transfers from [facility] using RW. Pt transfers to/from w/c Valeria-modA using RW. Pt transfers from [address] [facility] with RW and mod cues using grab bar. Stand to Sit  Assistance Level: Moderate assistance;Minimal assistance  Skilled Clinical Factors: Pt requiring Min-Mod A for stand to sit transfers. Bed To/From Chair  Technique: Stand pivot;Stand step  Assistance Level: Minimal assistance  Skilled Clinical Factors: Min A BSC stand pivot to w/c. Stand Pivot  Assistance Level: Minimal assistance  Skilled Clinical Factors: Pt requiring increased assistance as pt fatigue during session. Min  stand pivot EOB to w/c using RW. Mod A w/c to shower bench using RW. Max A shower bench to w/c usin ggrab bar stand pivot. Max A w/c to EOB stand pivot. Assessment  Assessment  Assessment: Patient with increased tolerance to OT POC this date, reports less pain with mobility/transfers and overall grossly Valeria for sit <> stands.   Pt does need consistent rest breaks and prolonged time to complete simple ADLs and transfers however progressing well towards goals. Pt educated on pain management and AE for LB dressing tasks with increased carryover but would benefit from consistent education. Pt with increased motivation this date, tolerated up to 3 minutes of standing during ADLs. O2 >90% throughout. Cont OT POC. Second session: Pt sitting EOB, agreeable and pleasant to OT. Pt was able to complete sit <> stand from EOB CGA, Valeria with O2 line management with RW to/from bathroom, able to manage pants/brief up/down standing CGA with RW support, use of 1 UE to manage pants up/down with PRN cues for BRY and upright posture. Pt was able to complete mobility x10 ft from EOB to WC with good endurance/balance. Pt was then able to complete 3 functional mobility/balance trials in gym with RW and CGA with PRN cues for O2 line management, no knee buckling noted and good safety with turning to position self to toss bean bags. Pt was able to toss 4 bean bags ambulating 20 ft, 24 ft, and 32 ft with 4-5 minute rest break between each bout, dropping to 80's after mobility but able to recover to 94% with PLB. Pt was then able to complete BUE ex 2# free weight 1 UE at a time 2x10 reps: chest press, circles forwards/backwards, internal/external rotation, wrist flexion/extension, supination/pronation, shoulder flexion with elbow extension. Pts O2 >90% throughout on 4L during exercises. Pt was able to doff socks with reacher and increased time sitting in WC, assist with donning socks. Pt with increased standing tolerance this date and motivated. Progressing towards independence and goals. Cont to rec home with assist PRN and HHOT S Level 3. Cont OT POC. Activity Tolerance: Patient limited by fatigue;Patient limited by endurance; Patient tolerated treatment well  Discharge Recommendations: 24 hour supervision or assist;Home with assist PRN;Home with nursing aide;Home with Home health OT;S Level 3  OT Equipment Recommendations  Equipment Needed: Yes  Mobility Devices: ADL Assistive Devices; Clide [place]; Wheelchair  Walker: Rolling  Wheelchair: Standard; Wheelchair Cushion Standard  ADL Assistive Devices: Toileting - Standard Commode; Shower Chair with back;Grab Bars - toilet;Grab Bars - shower; Reacher;Sock-Aid Hard;Long-handled Sponge  Other: Patient would benefit from [facility] [place] due to being confined to room at night and to provide support with toileting/transfers. Without a BSC pt is at a higher risk of falls. Safety Devices  Safety Devices in place: Yes  Type of devices: Call light within reach; Patient at risk for falls; All fall risk precautions in place;Gait belt;Nurse notified; Chair alarm in place; Left in chair  Restraints  Initially in place: No    Patient Education  Education  Education Given To: Patient  Education Provided: Role of Therapy; ADL Function;Plan of Care;Equipment;DME/Home Modifications;Transfer Training;Precautions; Safety; Energy Conservation; Fall Prevention Strategies  Education Provided Comments: AE use for LB dressing using sockaid and reacher, compensatory UB/LB dressing with spinal precautions, WIS transfers, safety with bathing, TLSO management, log roll, spinal precautions, standing balance. Education Method: Verbal;Demonstration; Teach Back  Barriers to Learning: Cognition  Education Outcome: Verbalized understanding;Demonstrated understanding;Continued education needed    Plan  Plan  Times per Week: 5 out of 7 days  Times per Day: Daily  Plan Weeks: 10 days  Current Treatment Recommendations: Strengthening;ROM;Balance training;Functional mobility training; Endurance training;Gait training;Neuromuscular re-education; Safety education & training;Pain management;Patient/Caregiver education & training;Equipment evaluation, education, & procurement; Modalities; Positioning;Self-Care / ADL; Home management training;Cognitive/Perceptual training;Coordination training    Goals  Patient Goals   Patient goals :  \"To go home and be out of pain\"  Short Term Goals  Time Frame for Short term goals: 5 days (8/21/22)  Short Term Goal 1: Pt will complete standing task x3 minutes SBA with LRAD-GOAL MET 8/23  Short Term Goal 2: Pt will complete toileting/transfer CGA with LRAD and DME PRN- EXTEND TO DC DATE  Short Term Goal 3: Pt will complete LE dressing Valeria using AE and LRAD PRN-GOAL MET 8/22  Long Term Goals  Time Frame for Long term goals : 10 days (8/26/22)  Long Term Goal 1: Pt will complete functional mobility in room/bathroom Rachael with LRAD for ADL management  Long Term Goal 2: Pt will complete IADL task standing vs sitting Rachael with LRAD  Long Term Goal 3: Pt will complete UE/LE bathing/dressing including donning/doffing brace (patient vs family education teachback) setup vs Rachael sitting vs standing with AE vs LRAD; ongoing Mod A UB dressing, Mod A LB dressing 8/20  Long Term Goal 4: Pt will complete toileting/transfer with LRAD and DME PRN Rachael        Therapy Time   Individual Concurrent Group Co-treatment   Time In 0930         Time Out 1000         Minutes 30         Timed Code Treatment Minutes: 30 Minutes     Second Session Therapy Time:   Individual Concurrent Group Co-treatment   Time In 1335        Time Out 1435        Minutes 60          Timed Code Treatment Minutes:  60 Minutes    Total Treatment Minutes:  [address] Anitra Abebe, OTR/L

## 2022-08-23 NOTE — PATIENT CARE CONFERENCE
[address] [facility]  Inpatient Rehabilitation  Weekly Team Conference Note    Date: 2022  Patient Name: Desire Alejo        MRN: [id number]    : 1955  (69 y.o.)  Gender: female   Referring Practitioner: Cortland Goltz, MD  Diagnosis: L1 burst fx s/p T11-L3 PDFF with L1 corpectomy,       Interventions to be utilized toward barriers to discharge, per discipline:  [address] observed barriers to dc: Pain, Limited safety awareness, Decreased endurance, Upper extremity weakness, Lower extremity weakness, Medication managment, and incision care/management   Nursing interventions:Assist with ADLs, medication management   Family Education:  No  Fall Risk:  Yes      PHYSICAL THERAPY  Physical therapy observed barriers to dc:    Baseline: PTA pt was IND with all activities with 4WW. Current level: Pt is SBA with bed mobility using hand rails and HOB elevated, ambulation and transfers grossly CGA, but as pt fatigues this can transition to MIN A. Barriers to DC: pt weakness and decreased functional capacity, O2 saturation dropping with activity, high levels of fatigue with all activity.    Needs in order to achieve dc home/next level of care: None at this time      Physical therapy interventions:   Current Treatment Recommendations: Strengthening, Balance training, Functional mobility training, Transfer training, Endurance training, Wheelchair mobility training, Gait training, Stair training, Equipment evaluation, education, & procurement, Neuromuscular re-education, Home exercise program, Safety education & training, Patient/Caregiver education & training, Positioning    PT Goals:  Short term goals:  Short term goal 1: Pt will complete bed mobility with SBA, with min cues for log roll technique -- : GOAL PARTIALLY MET SBA with bed rails and cues for log roll  Short term goal 2: Pt will complete functional transfers with CGA consistently, using LRAD -- : GOAL PARTIALLY MET pt can transfer at [facility], but after an hour of PT requires MIN A due to fatigue. Short term goal 3: Pt will ambulate 25ft with min A using RW -- 8/23 GOAL MET            Long Term Goals  Time Frame for Long term goals : 10 days- 8/26/22  Long term goal 1: Pt will complete bed mobility with mod I without cues for log roll technique  Long term goal 2: Pt will complete functional transfers with mod I using LRAD  Long term goal 3: Pt will ambulate 50ft with supervision using LRAD  Long term goal 4: Pt will complete car transfer with supervision using LRAD  Long term goal 5: Pt will verbalize 3/3 spinal precautions without cues and adhere to precautions with mobility without cues    PT Assessment:  Assessment: pt foud at EOB sitting and taking pills with RN this AM.  pt agreeable to PT.  pt demonstrates lack of terminal knee extension in gait and standing. pt very short of breath with all activity and requires prolonged rest breaks 3-4 minutes after each activity to regain her breath and feel up to more activity. pt O2 sats dropped to low 80's with all bouts of exercises and standing on today's date and required rest breaks to bring O2 sats back up.  pt demonstrates decreased activity tolerance and functional capacity at this time, as well as decreased strength. pt would continue to benefit from skilled PT to address the above stated deficits.       OCCUPATIONAL THERAPY  Occupational therapy observed barriers to dc:    Baseline: independent with ADLs, IADLs,  assist PRn   Current level: Valeria for sit <> stands, up to x2A for mobility, modA for LE dressing, Valeria for brace management, poor standing tolerance   Barriers to DC: endurance, pain, anxiety, limited standing tolerance, limited progress towards goals due to nausea, fatigue, and pain   Needs in order to achieve dc home/next level of care: will need assistance for home if patient remains at this level, 24 hour with HHOT S Level 3 and aide; DME: Shower chair, this date, tolerated up to 3 minutes of standing during ADLs. O2 >90% throughout. Cont OT POC. Activity Tolerance: Patient limited by fatigue;Patient limited by endurance; Patient tolerated treatment well  Discharge Recommendations: 24 hour supervision or assist;Home with assist PRN;Home with nursing aide;Home with Home health OT;S Level 3  OT Equipment Recommendations  Equipment Needed: Yes  Mobility Devices: ADL Assistive Devices; [place] Showman; Wheelchair  Walker: Rolling  Wheelchair: Standard; Wheelchair Cushion Standard  ADL Assistive Devices: Toileting - Standard Commode; Shower Chair with back;Grab Bars - toilet;Grab Bars - shower; Reacher;Sock-Aid Hard;Long-handled Sponge  Other: Patient would benefit from [facility] [place] due to being confined to room at night and to provide support with toileting/transfers. Without a BSC pt is at a higher risk of falls. Safety Devices  Safety Devices in place: Yes  Type of devices: Call light within reach; Patient at risk for falls; All fall risk precautions in place;Gait belt;Nurse notified; Chair alarm in place; Left in chair  Restraints  Initially in place: No      SPEECH THERAPY   Speech therapy observed barriers to dc:    Baseline: pt lives with , dtr manages meds, shares finances with ,  does the cooking, cleaning, laundry, yard work. Pt not an active .     Current level: mild high level cog, reduced breath support    Barriers to DC: endurance   Needs in order to achieve dc home/next level of care: carryover of compensatory strategies, improved breath support     Speech Therapy interventions:  Dysphagia: N/A- all dysphagia goals met on 08/22/22  Speech/Language/Cognition: Compensatory strategy training and carryover, recall/STM, problem solving, reasoning, exec function, thought organization, attention, respiratory retraining exercises      Dysphagia Goals:  Timeframe for Long-term Goals: 10 days; 08/26/2022  Goal 1: The patient will tolerate least restrictive diet with no clinical s/s of aspiration or worsening respiratory/pulmonary status. 08/22 goal met   Short-term Goals  Timeframe for Short-term Goals: 5 days; 0821/2022  Goal 1: The patient will tolerate recommended diet with no clinical s/s of aspiration 5/5. 08/22 goal met   Goal 2: The patient/caregiver will demonstrate understanding of compensatory swallow strategies, for improved swallow safety. 08/22 goal met        Speech/Language/Cog Goals:  Timeframe for Long-term Goals: 10 days (08/26/2022)  Goal 1: Pt will improve overall cognitive-linguistic abilities to promote safe and independent return home PLOF. 08/23: progressing, ongoing   Short-term Goals  Timeframe for Short-term Goals: 7 days (08/23/2022) *goals extended through 08/26/22*  Goal 1: Pt will complete graded recall tasks using compensatory strategies with 90% acc given min cues. 08/23: progressing, ongoing   Goal 2: Pt will complete executive function tasks (e.g. money, time, etc) with 90% acc given min cues. 08/23: progressing, ongoing   Goal 3: Pt will complete higher level critical thinking tasks with 90% acc given min cues. 08/23: progressing, ongoing   Goal 4: Pt will complete respiratory retraining exercises to improve overall breath support with speech 10/10 given min cues. 08/23: progressing, ongoing   Timeframe for Short-term Goals: 7 days (08/23/2022)*goals extended through 08/26/22*    ST Assessment:  Pt pleasant and cooperative, agreeable to participate. Pt appeared to be in better spirits today, stating overall she felt really good today. Pt completed respiratory retraining exercises x10 each, with occasional min cues required. Pt completed higher level recall/thought organization task with % acc indep. Pt is making good progress towards goals and remains motivated to improve. Continue goals above. NUTRITION  Weight: 128 lb 12 oz (58.4 kg) / Body mass index is 23.55 kg/m². Diet Order: ADULT DIET;  Regular  ADULT ORAL NUTRITION SUPPLEMENT; Breakfast, Lunch, Dinner; Standard High Calorie/High Protein Oral Supplement  PO Meals Eaten (%): 26 - 50%  Education: No recommendation at this time       CASE MANAGEMENT  Assessment: 69 yr old female. Dx H/O Spinal surgery. Independent prior level of function. Lives with spouse in a one level home with ramp entrance. Patient has rollator, manual w/c and home oxygen at home. Therapy recommendations 24 hour supervision or assist;Home with assist PRN;Home with nursing aide;Home with Home health PT/OT;S Level 3. Alternate Solution following for home services. Active with [facility] for home O2. Spouse will need to bring O2 tank from home for discharge. Interdisciplinary Goals:   1.) Pt will complete LE dressing Valeria using AE and LRAD PRN  2.) Pt will complete respiratory retraining exercises for improved breath support across all disciplines indep  3.)pt will be able to ambulate 25 feet with RW and MOD I.  4.) Pt will have improved pain management       [x]  Family Training discussed at conference and to be scheduled. Discharge Plan   Estimated discharge date: 8/31/2022  Destination: home health  Pass:No  Services at Discharge: [address], Occupational Therapy, and Nursing   Equipment at Discharge: Shower chair with back, BSC, RW. Team Members Present at Conference:  : Shanika Jones RN    Occupational Therapist: Katherin Ibrahim OT  Physical Therapist: Abhishek Monroe, PT  Speech Therapist: Zayda Martinez, [facility] SLP   Nurse: Nishant Carranza RN  Dietician: Betty Marie, PABLITON, LD  : Williams Ingram, OTR/L  Psychiatry: N/A    Family members present at conference: No      I led this team conference and I approve the established interdisciplinary plan of care as documented within the medical record of [address] [place] [address].     MD: Electronically signed by Alexander Mendez MD on 8/24/2022 at 1:54 PM

## 2022-08-24 ENCOUNTER — APPOINTMENT (OUTPATIENT)
Dept: GENERAL RADIOLOGY | Age: 67
DRG: 559 | End: 2022-08-24
Attending: STUDENT IN AN ORGANIZED HEALTH CARE EDUCATION/TRAINING PROGRAM
Payer: MEDICARE

## 2022-08-24 LAB
ANION GAP SERPL CALCULATED.3IONS-SCNC: 3 MMOL/L (ref 3–16)
BASOPHILS ABSOLUTE: 0.1 K/UL (ref 0–0.2)
BASOPHILS RELATIVE PERCENT: 1 %
BUN BLDV-MCNC: 9 MG/DL (ref 7–20)
CALCIUM SERPL-MCNC: 7.8 MG/DL (ref 8.3–10.6)
CHLORIDE BLD-SCNC: 101 MMOL/L (ref 99–110)
CO2: 33 MMOL/L (ref 21–32)
CREAT SERPL-MCNC: <0.5 MG/DL (ref 0.6–1.2)
EOSINOPHILS ABSOLUTE: 0.4 K/UL (ref 0–0.6)
EOSINOPHILS RELATIVE PERCENT: 5.7 %
GFR AFRICAN AMERICAN: >60
GFR NON-AFRICAN AMERICAN: >60
GLUCOSE BLD-MCNC: 102 MG/DL (ref 70–99)
HCT VFR BLD CALC: 25.6 % (ref 36–48)
HEMOGLOBIN: 8.2 G/DL (ref 12–16)
LYMPHOCYTES ABSOLUTE: 1.8 K/UL (ref 1–5.1)
LYMPHOCYTES RELATIVE PERCENT: 24.4 %
MCH RBC QN AUTO: 27.6 PG (ref 26–34)
MCHC RBC AUTO-ENTMCNC: 31.9 G/DL (ref 31–36)
MCV RBC AUTO: 86.5 FL (ref 80–100)
MONOCYTES ABSOLUTE: 0.5 K/UL (ref 0–1.3)
MONOCYTES RELATIVE PERCENT: 7.1 %
NEUTROPHILS ABSOLUTE: 4.6 K/UL (ref 1.7–7.7)
NEUTROPHILS RELATIVE PERCENT: 61.8 %
PDW BLD-RTO: 17.9 % (ref 12.4–15.4)
PLATELET # BLD: 194 K/UL (ref 135–450)
PMV BLD AUTO: 7.2 FL (ref 5–10.5)
POTASSIUM REFLEX MAGNESIUM: 3.9 MMOL/L (ref 3.5–5.1)
RBC # BLD: 2.96 M/UL (ref 4–5.2)
SODIUM BLD-SCNC: 137 MMOL/L (ref 136–145)
WBC # BLD: 7.5 K/UL (ref 4–11)

## 2022-08-24 PROCEDURE — 71045 X-RAY EXAM CHEST 1 VIEW: CPT

## 2022-08-24 PROCEDURE — 85025 COMPLETE CBC W/AUTO DIFF WBC: CPT

## 2022-08-24 PROCEDURE — 6370000000 HC RX 637 (ALT 250 FOR IP): Performed by: STUDENT IN AN ORGANIZED HEALTH CARE EDUCATION/TRAINING PROGRAM

## 2022-08-24 PROCEDURE — 97129 THER IVNTJ 1ST 15 MIN: CPT

## 2022-08-24 PROCEDURE — 97110 THERAPEUTIC EXERCISES: CPT

## 2022-08-24 PROCEDURE — 6370000000 HC RX 637 (ALT 250 FOR IP): Performed by: REGISTERED NURSE

## 2022-08-24 PROCEDURE — 92507 TX SP LANG VOICE COMM INDIV: CPT

## 2022-08-24 PROCEDURE — 94761 N-INVAS EAR/PLS OXIMETRY MLT: CPT

## 2022-08-24 PROCEDURE — 94640 AIRWAY INHALATION TREATMENT: CPT

## 2022-08-24 PROCEDURE — 97535 SELF CARE MNGMENT TRAINING: CPT

## 2022-08-24 PROCEDURE — 1280000000 HC REHAB R&B

## 2022-08-24 PROCEDURE — 6360000002 HC RX W HCPCS: Performed by: STUDENT IN AN ORGANIZED HEALTH CARE EDUCATION/TRAINING PROGRAM

## 2022-08-24 PROCEDURE — 99222 1ST HOSP IP/OBS MODERATE 55: CPT | Performed by: INTERNAL MEDICINE

## 2022-08-24 PROCEDURE — 80048 BASIC METABOLIC PNL TOTAL CA: CPT

## 2022-08-24 PROCEDURE — 2700000000 HC OXYGEN THERAPY PER DAY

## 2022-08-24 PROCEDURE — 97530 THERAPEUTIC ACTIVITIES: CPT

## 2022-08-24 RX ORDER — SENNA PLUS 8.6 MG/1
1 TABLET ORAL DAILY PRN
Status: DISCONTINUED | OUTPATIENT
Start: 2022-08-24 | End: 2022-09-02 | Stop reason: HOSPADM

## 2022-08-24 RX ADMIN — METHOCARBAMOL TABLETS 750 MG: 750 TABLET, COATED ORAL at 14:40

## 2022-08-24 RX ADMIN — POLYETHYLENE GLYCOL 3350, SODIUM SULFATE ANHYDROUS, SODIUM BICARBONATE, SODIUM CHLORIDE, POTASSIUM CHLORIDE 4000 ML: 236; 22.74; 6.74; 5.86; 2.97 POWDER, FOR SOLUTION ORAL at 20:36

## 2022-08-24 RX ADMIN — ONDANSETRON 4 MG: 4 TABLET, ORALLY DISINTEGRATING ORAL at 14:41

## 2022-08-24 RX ADMIN — OXYCODONE AND ACETAMINOPHEN 1 TABLET: 5; 325 TABLET ORAL at 12:00

## 2022-08-24 RX ADMIN — HEPARIN SODIUM 5000 UNITS: 5000 INJECTION INTRAVENOUS; SUBCUTANEOUS at 05:05

## 2022-08-24 RX ADMIN — ATORVASTATIN CALCIUM 40 MG: 40 TABLET, FILM COATED ORAL at 14:41

## 2022-08-24 RX ADMIN — OXYCODONE AND ACETAMINOPHEN 1 TABLET: 5; 325 TABLET ORAL at 05:05

## 2022-08-24 RX ADMIN — HEPARIN SODIUM 5000 UNITS: 5000 INJECTION INTRAVENOUS; SUBCUTANEOUS at 22:48

## 2022-08-24 RX ADMIN — Medication 400 MG: at 14:40

## 2022-08-24 RX ADMIN — SERTRALINE HYDROCHLORIDE 200 MG: 50 TABLET ORAL at 08:16

## 2022-08-24 RX ADMIN — IPRATROPIUM BROMIDE AND ALBUTEROL SULFATE 1 AMPULE: 2.5; .5 SOLUTION RESPIRATORY (INHALATION) at 07:22

## 2022-08-24 RX ADMIN — METHOCARBAMOL TABLETS 750 MG: 750 TABLET, COATED ORAL at 19:41

## 2022-08-24 RX ADMIN — HEPARIN SODIUM 5000 UNITS: 5000 INJECTION INTRAVENOUS; SUBCUTANEOUS at 14:40

## 2022-08-24 RX ADMIN — LEVOTHYROXINE SODIUM 125 MCG: 0.12 TABLET ORAL at 05:05

## 2022-08-24 RX ADMIN — MIRTAZAPINE 30 MG: 15 TABLET, FILM COATED ORAL at 19:41

## 2022-08-24 RX ADMIN — OXYCODONE AND ACETAMINOPHEN 2 TABLET: 5; 325 TABLET ORAL at 19:41

## 2022-08-24 RX ADMIN — CETIRIZINE HYDROCHLORIDE 10 MG: 10 TABLET, FILM COATED ORAL at 08:16

## 2022-08-24 RX ADMIN — METHOCARBAMOL TABLETS 750 MG: 750 TABLET, COATED ORAL at 08:16

## 2022-08-24 RX ADMIN — IPRATROPIUM BROMIDE AND ALBUTEROL SULFATE 1 AMPULE: 2.5; .5 SOLUTION RESPIRATORY (INHALATION) at 20:55

## 2022-08-24 RX ADMIN — PANTOPRAZOLE SODIUM 40 MG: 40 TABLET, DELAYED RELEASE ORAL at 05:05

## 2022-08-24 RX ADMIN — NICOTINE POLACRILEX 20 MG: 4 GUM, CHEWING ORAL at 05:05

## 2022-08-24 RX ADMIN — IPRATROPIUM BROMIDE AND ALBUTEROL SULFATE 1 AMPULE: 2.5; .5 SOLUTION RESPIRATORY (INHALATION) at 11:36

## 2022-08-24 ASSESSMENT — PAIN SCALES - GENERAL
PAINLEVEL_OUTOF10: 0
PAINLEVEL_OUTOF10: 0
PAINLEVEL_OUTOF10: 8
PAINLEVEL_OUTOF10: 9
PAINLEVEL_OUTOF10: 5
PAINLEVEL_OUTOF10: 7
PAINLEVEL_OUTOF10: 0
PAINLEVEL_OUTOF10: 4

## 2022-08-24 ASSESSMENT — PAIN DESCRIPTION - ONSET
ONSET: ON-GOING
ONSET: ON-GOING

## 2022-08-24 ASSESSMENT — PAIN DESCRIPTION - ORIENTATION
ORIENTATION: MID
ORIENTATION: MID;LOWER
ORIENTATION: LOWER;MID

## 2022-08-24 ASSESSMENT — PAIN DESCRIPTION - DESCRIPTORS
DESCRIPTORS: ACHING;SORE
DESCRIPTORS: ACHING;THROBBING
DESCRIPTORS: ACHING;SORE

## 2022-08-24 ASSESSMENT — PAIN DESCRIPTION - LOCATION
LOCATION: BACK

## 2022-08-24 ASSESSMENT — PAIN DESCRIPTION - FREQUENCY
FREQUENCY: CONTINUOUS
FREQUENCY: CONTINUOUS

## 2022-08-24 ASSESSMENT — PAIN DESCRIPTION - PAIN TYPE
TYPE: ACUTE PAIN;SURGICAL PAIN
TYPE: ACUTE PAIN;SURGICAL PAIN

## 2022-08-24 NOTE — PROGRESS NOTES
Physical Therapy  Facility/Department: [facility]  Rehabilitation Physical Therapy Treatment Note    NAME: Panchito Hatfield  : 1955 (69 y.o.)  MRN: [id number]  CODE STATUS: Full Code    Date of Service: 22       Restrictions:  Restrictions/Precautions: General Precautions; Fall Risk;Up as Tolerated  Position Activity Restriction  Spinal Precautions: No Bending; No Twisting; No Lifting  Spinal Precautions: TLSO when OOB, can jamila EOB; no lifting >10 lbs  Other position/activity restrictions: 3L O2, TLSO, spinal precautions, ok to shower. SUBJECTIVE  Subjective  Subjective: Pt seated EOB upon arrival and agreeable to PT session this afternoon. Reporting increased fatigue this afternoon  Pain: Pt reports 5/10 pain in low back and R groin            OBJECTIVE  Cognition  Overall Cognitive Status: Exceptions  Arousal/Alertness: Appropriate responses to stimuli  Following Commands: Follows all commands without difficulty; Follows multistep commands with repitition  Attention Span: Attends with cues to redirect  Memory: Appears intact  Safety Judgement: Decreased awareness of need for safety  Problem Solving: Assistance required to identify errors made;Assistance required to correct errors made;Assistance required to implement solutions;Assistance required to generate solutions  Insights: Decreased awareness of deficits  Initiation: Requires cues for some  Sequencing: Requires cues for some  Orientation  Overall Orientation Status: Within Normal Limits  Orientation Level: Oriented X4    Functional Mobility  Sit to Supine  Assistance Level: Supervision;Stand by assist  Skilled Clinical Factors: HOB elevated, use of BR, increased time to complete with log roll technique  Supine to Sit  Assistance Level: Supervision;Stand by assist  Skilled Clinical Factors: HOB elevated, use of BR, increased time to complete with log roll technique  Sit to Stand  Assistance Level: Contact guard assist;Minimal assistance  Skilled Clinical Factors: pt completes x 6 reps total during session from EOB and recliner chair with CGA-min A and increased time; cues for hand placement and anterior weight shift  Stand to Sit  Assistance Level: Contact guard assist;Minimal assistance  Skilled Clinical Factors: pt completes x 6 reps total during session with CGA-min A and increased time d/t pain and fatigue  Stand Pivot  Assistance Level: Contact guard assist  Skilled Clinical Factors: using RW, EOB>recliner chair      Environmental Mobility  Ambulation  Surface: Level surface  Device: Rolling walker  Distance: 5ft  Activity: Within Room  Activity Comments: pt declines further distance and further bouts d/t fatigue, pain, and weakness  Additional Factors: Verbal cues; Hand placement cues; Increased time to complete  Assistance Level: Contact guard assist  Gait Deviations: Slow mike;Decreased step length bilateral;Decreased weight shift bilateral;Decreased heel strike right;Decreased heel strike left; Unsteady gait; Wide base of support;Path deviations             PT Exercises  Exercise Treatment:   Seated BLE exercises (1.5#): ankle pumps x 20, LAQ x 15, seated marching x 15. Standing LE exercises: marching x 10, mini squats x 10   *Pt requires therapeutic rest break after each exercise, both in seated and standing. O2 sats decrease to 76-80% after each exercise on 4L, requiring ~3 mins to recover to 90% or greater with min cues for PLB. ASSESSMENT/PROGRESS TOWARDS GOALS  Vital Signs  Heart Rate: 80  BP: 110/60  BP Location: Right upper arm  MAP (Calculated): 76.67  SpO2: 95 %  O2 Device: Nasal cannula (4L O2)    Assessment  Assessment: Pt reporting increased fatigue and pain this afternoon, which limits participation and progression of functional mobility. Pt able to complete bed mobility with supervision-SBA, functional transfers with CGA-min A, and ambulate up to 5ft with CGA using RW.  Pt declines further ambulation bouts or distance d/t fatigue and pain. Pt participates in LE seated and standing exercises, with seated rest break after each exercise. O2 sats noted to decrease to 76-80% on 4L after each exercise, requiring ~3 mins to recover to 90% or greater. Pt overall remains limited by pain, fatigue, deconditioning, and weakness. Will continue to progress functional mobility as appropriate. Activity Tolerance: Patient tolerated treatment well;Patient limited by pain; Patient limited by fatigue  Discharge Recommendations: 24 hour supervision or assist;Home with Home health PT  PT Equipment Recommendations  Equipment Needed: Yes  Mobility Devices: Aliza Hopkins: Rolling  Other: Pt also has w/c at home    Goals  Patient Goals   Patient goals : Phoebe Rodriguez be able to walk straight\"  Short Term Goals  Time Frame for Short term goals: 5 days- 8/20/22  Short term goal 1: Pt will complete bed mobility with SBA, with min cues for log roll technique -- 8/20: GOAL PARTIALLY MET SBA with bed rails and cues for log roll  Short term goal 2: Pt will complete functional transfers with CGA consistently, using LRAD -- 8/23: GOAL PARTIALLY MET pt can transfer at [facility], but after an hour of PT requires MIN A due to fatigue.   Short term goal 3: Pt will ambulate 25ft with min A using RW -- 8/23 GOAL MET  Long Term Goals  Time Frame for Long term goals : 10 days- 8/26/22-- goals extended to 8/31 based on updated d/c date  Long term goal 1: Pt will complete bed mobility with mod I without cues for log roll technique  Long term goal 2: Pt will complete functional transfers with mod I using LRAD  Long term goal 3: Pt will ambulate 50ft with supervision using LRAD  Long term goal 4: Pt will complete car transfer with supervision using LRAD  Long term goal 5: Pt will verbalize 3/3 spinal precautions without cues and adhere to precautions with mobility without cues    PLAN OF CARE/SAFETY  Plan  Plan: 5-7 times per week  Plan weeks: 10 days  Current Treatment Recommendations: Strengthening;Balance training;Functional mobility training;Transfer training; Endurance training; Wheelchair mobility training;Gait training;Stair training;Equipment evaluation, education, & procurement; Neuromuscular re-education;Home exercise program;Safety education & training;Patient/Caregiver education & training;Positioning  Safety Devices  Type of Devices: All fall risk precautions in place;Call light within reach; Chair alarm in place; Patient at risk for falls; Left in chair;Nurse notified    EDUCATION  Education  Education Given To: Patient  Education Provided: Role of Therapy;Cognition;Plan of Care;Family Education;Mobility Training;Equipment;Precautions;Transfer Training; Safety; Energy Conservation; Fall Prevention Strategies;DME/Home Modifications  Education Provided Comments: Educated on importance of OOB mobility, spinal px and use of TLSO, functional mobility and positional changes with back pain, LE exercises  Education Method: Demonstration;Verbal  Barriers to Learning: None  Education Outcome: Verbalized understanding;Demonstrated understanding;Continued education needed        Therapy Time   Individual Concurrent Group Co-treatment   Time In 1330         Time Out 1430         Minutes 60           Timed Code Treatment Minutes: [address] [facility] AZUL Gilbert, KULWINDERT 08/24/22 at 3:26 PM

## 2022-08-24 NOTE — PROGRESS NOTES
Occupational Therapy  Facility/Department: [facility]  Rehabilitation Occupational Therapy Daily Treatment Note    Date: 22  Patient Name: Merle Dancer       Room: 8022/2536-15  MRN: [id number]  Account: [de-identified]   : 1955  (73 y.o.) Gender: female                Past Medical History:  has a past medical history of A-fib ([facility] [address] Anxiety, Cryptogenic organizing pneumonia ([facility] [address] Depression, GERD (gastroesophageal reflux disease), Hyperlipidemia, On home oxygen therapy, Rash, and Thyroid disease. Past Surgical History:   has a past surgical history that includes Cholecystectomy; bronchoscopy (N/A, 2019); bronchoscopy (2019); bronchoscopy (2019); bronchoscopy (2019); bronchoscopy (07/10/2019); bronchoscopy (N/A, 7/10/2019); Hysterectomy, total abdominal; bronchoscopy (Bilateral, 2019); bronchoscopy (N/A, 2019); bronchoscopy (N/A, 2019); and Arm Surgery (Left, 3/2/2020). Restrictions  Restrictions/Precautions: General Precautions; Fall Risk;Up as Tolerated  Spinal Precautions: TLSO when OOB, can jamila EOB; no lifting >10 lbs  Other position/activity restrictions: 3L O2, TLSO, spinal precautions, ok to shower. Subjective  Subjective: Pt in bed, requesting shower. During toielting, pt had blood. RN notified, RN assessed and observed prolapse. Pt increased time for toileting and returned to bed for staple removal. pt reporting 5/10 pain. to complete shower tomorrow. Agreeable and pleasant  Restrictions/Precautions: General Precautions; Fall Risk;Up as Tolerated           Objective     Cognition  Overall Cognitive Status: Exceptions  Arousal/Alertness: Appropriate responses to stimuli  Following Commands: Follows all commands without difficulty; Follows multistep commands with repitition  Attention Span: Attends with cues to redirect  Memory: Appears intact  Safety Judgement: Decreased awareness of need for safety  Problem Solving: Assistance required to identify errors blood during toileting with RN notifying and assessing Increased time reuqired, prolapse observed. Pt reutrned to bed with just brief for RN staff to remove staples. Pt with increased motivation this date. Second Sessionn: Activity Tolerance: Patient tolerated treatment well;Patient limited by pain; Patient limited by fatigue  Discharge Recommendations: 24 hour supervision or assist;Home with assist PRN;Home with nursing aide;Home with Home health OT;S Level 3  OT Equipment Recommendations  Walker: Rolling  Wheelchair: Standard; Wheelchair Cushion Standard  ADL Assistive Devices: Toileting - Standard Commode; Shower Chair with back;Grab Bars - toilet;Grab Bars - shower; Reacher;Sock-Aid Hard;Long-handled Sponge  Other: Patient would benefit from [facility] [place] due to being confined to room at night and to provide support with toileting/transfers. Without a BSC pt is at a higher risk of falls. Safety Devices  Safety Devices in place: Yes  Type of devices: Call light within reach; Patient at risk for falls; All fall risk precautions in place;Gait belt;Nurse notified; Chair alarm in place; Left in chair    Addendum Second Session:   Pt on commode upon arrival with increased blood found in commode. RN and MD notified. Pt continuing to report 10/10 pain in R leg and poor static and dynamic standing balance requiring min A for pants management and max A for pericare this date. Pt seated after toileting with 02 desat to 80% on 3L. Pt not recovering to >90%. Pt placed on 4L with increased O2 sat to 94%. Pt completed sit<>stand and pants management up and down with min with focus on increasing WS and reducing UE support in standing. Continue POC. Objective: Toileting: max a posterior pericare in standing  Min A for balance for pants management x2 trials, max A for lynnette-care this date. Sit<>stand min A   Stand pivot with RW min A  Max fatigue. Addendum Third Session:   Pt completed BUE HEP bed level 2/2 pain and fatigue.  Pt demo'd good mor and willingness to participate in BUE HEP. Per RN, during activity, MD approved increasing O2 to 6L PRN as necessary. Pt left in bed with all safety devices on and all needs met. Pt reporting pain in back during AROM in LUE 2/2 getting staples removed from back this date. Pt reporting numbness and tingling in digits 4/5, educated on ulnar nerve glides. Objective:   20 reps unilateral UE circuit (completed on each arm): shoulder flexion, elbow flexion, chest press, over head press. 20 reps ulnar nerve glide AROM on L hand. Patient Education  Education  Education Given To: Patient  Education Provided: Role of Therapy; ADL Function;Plan of Care;Equipment;DME/Home Modifications;Transfer Training;Precautions; Safety; Energy Conservation; Fall Prevention Strategies  Education Provided Comments: AE use for LB dressing using sockaid and reacher, compensatory UB/LB dressing with spinal precautions, WIS transfers, safety with bathing, TLSO management, spinal precautions, standing balance. Barriers to Learning: Cognition  Education Outcome: Verbalized understanding;Demonstrated understanding;Continued education needed    Plan  Plan  Times per Week: 5 out of 7 days  Times per Day: Daily  Plan Weeks: 10 days  Current Treatment Recommendations: Strengthening;ROM;Balance training;Functional mobility training; Endurance training;Gait training;Neuromuscular re-education; Safety education & training;Pain management;Patient/Caregiver education & training;Equipment evaluation, education, & procurement; Modalities; Positioning;Self-Care / ADL; Home management training;Cognitive/Perceptual training;Coordination training    Goals  Patient Goals   Patient goals :  \"To go home and be out of pain\"  Short Term Goals  Time Frame for Short term goals: 5 days (8/21/22)  Short Term Goal 1: Pt will complete standing task x3 minutes SBA with LRAD-GOAL MET 8/23  Short Term Goal 2: Pt will complete toileting/transfer CGA with LRAD and DME PRN- EXTEND TO DC DATE  Short Term Goal 3: Pt will complete LE dressing Valeria using AE and LRAD PRN-GOAL MET 8/22  Long Term Goals  Time Frame for Long term goals : 10 days (8/26/22)  Long Term Goal 1: Pt will complete functional mobility in room/bathroom Rachael with LRAD for ADL management  Long Term Goal 2: Pt will complete IADL task standing vs sitting Rachael with LRAD  Long Term Goal 3: Pt will complete UE/LE bathing/dressing including donning/doffing brace (patient vs family education teachback) setup vs Rachael sitting vs standing with AE vs LRAD; ongoing Mod A UB dressing, Mod A LB dressing 8/20  Long Term Goal 4: Pt will complete toileting/transfer with LRAD and DME PRN Rachael    Therapy Time   Individual Concurrent Group Co-treatment   Time In 1350         Time Out 1405         Minutes 15         Timed Code Treatment Minutes: 15 Minutes     Second Session Therapy Time:   Individual Concurrent Group Co-treatment   Time In 1350        Time Out 1405        Minutes 15          Timed Code Treatment Minutes:  15 Minutes  Second Session Therapy Time:   Individual Concurrent Group Co-treatment   Time In 1350        Time Out 1405        Minutes 15          Timed Code Treatment Minutes:  15 Minutes    Total Treatment Minutes:  90 minutes    Allison Riding, OT

## 2022-08-24 NOTE — CONSULTS
Patient - Elba Hunter   MRN -  [id number]   [facility]   - 1955      Date of Admission -  2022  5:10 PM  Date of evaluation -  2022  Room -    Hospital Day - 8  Consulting - Tirso Maloney MD Primary Care Physician - Fred Barclay MD   Chief Complaint   Desaturation with activity. [address] Problems    Diagnosis Date Noted    Severe malnutrition [facility]) [E43] 2022     Priority: Medium    H/O Spinal surgery [Z98.890] 2022     Priority: Medium     HPI   Elba Hunter is a 69 y.o. female admitted to rehab after spine surgery. Pulmonary was consulted for evaluation of desaturation. The patient has past medical history of interstitial lung disease follows with Dr. Andrade Osman. She had extensive work-up for the interstitial lung disease including EBUS, bronchoscopy, cryo biopsy and serology. It was not definitely classified. But she has biopsy in the past that showed organizing pneumonia. The patient is on pirfenidone however the patient gets upset stomach and vomiting sometimes with pirfenidone. She stops taking it for some time then she was put on it again. She uses oxygen 3 L/min nasal cannula at baseline. The patient underwent back surgery and admitted to rehab. Today she went to the bathroom and apparently she desatted to the 80s on 3 L/min nasal cannula, it was increased to 4 L/min and it took her time to recover. She is back to baseline when she is seen. The patient denied any fever or chills. No new cough. She feels the same as on previous days. No worsening shortness of breath however she feels more tired today because of physical therapy. No sputum production. Chest x-ray showed chronic changes with no acute finding.   Past Medical History         Diagnosis Date    A-fib [facility])     Anxiety     Cryptogenic organizing pneumonia ([facility] [address]     Depression     GERD (gastroesophageal reflux disease) Hyperlipidemia     On home oxygen therapy     uses O2 3L prn    Rash     Thyroid disease     hypothyroidism      Past Surgical History           Procedure Laterality Date    ARM SURGERY Left 3/2/2020    LEFT ULNAR NERVE DECOMPRESSION AT THE ELBOW performed by Sara Boyd MD at [address] [place] [address] 6/27/2019    EBUS WF W/ANES. performed by Shama Gil MD at [place] [address]  6/27/2019    BRONCHOSCOPY/TRANSBRONCHIAL NEEDLE BIOPSY performed by Shama Gil MD at [place] [address]  6/27/2019    BRONCHOSCOPY/TRANSBRONCHIAL LUNG BIOPSY performed by Shama Gil MD at [place] [address]  6/27/2019    BRONCHOSCOPY ALVEOLAR LAVAGE performed by Shama Gil MD at [place] [address]  07/10/2019    BRONCHOSCOPY N/A 7/10/2019    BRONCHOSCOPY ALVEOLAR LAVAGE performed by Garret Forbes MD at [place] [address] Bilateral 08/06/2019    BRONCHOSCOPY N/A 8/6/2019    BRONCHOSCOPY ALVEOLAR LAVAGE performed by Amara Adame MD at [place] [address] N/A 12/24/2019    BRONCHOSCOPY ALVEOLAR LAVAGE performed by Carlene Wan MD at [facility], TOTAL ABDOMINAL (CERVIX REMOVED)      partial     Diet    ADULT DIET;  Regular  ADULT ORAL NUTRITION SUPPLEMENT; Breakfast, Lunch, Dinner; Standard High Calorie/High Protein Oral Supplement  Allergies    Penicillins, Cefuroxime, Doxycycline, Imitrex [sumatriptan], Meperidine, Other, Sulfa antibiotics, Keflex [cephalexin], and Tape Sheilda Snidy tape]  Social History     Social History     Socioeconomic History    Marital status:      Spouse name: Not on file    Number of children: 7    Years of education: Not on file    Highest education level: Not on file   Occupational History    Not on file   Tobacco Use    Smoking status: Never    Smokeless tobacco: Never   Vaping Use    Vaping Use: Never used   Substance and Sexual Activity Alcohol use: No    Drug use: No    Sexual activity: Not Currently   Other Topics Concern    Not on file   Social History Narrative    Not on file     Social Determinants of Health     Financial Resource Strain: Not on file   Food Insecurity: Not on file   Transportation Needs: No Transportation Needs    Lack of Transportation (Medical): No    Lack of Transportation (Non-Medical): No   Physical Activity: Not on file   Stress: Not on file   Social Connections: Not on file   Intimate Partner Violence: Not on file   Housing Stability: Not on file     Family History          Problem Relation Age of Onset    Diabetes Mother     High Blood Pressure Mother     Asthma Sister     Other Father        ROS    10 systems were reviewed the patient and is negative except for the above  Meds    Current Medications    potassium chloride  20 mEq Oral BID    magnesium oxide  400 mg Oral Daily    lidocaine  1 patch TransDERmal Daily    lidocaine  1 patch TransDERmal Daily    ipratropium-albuterol  1 ampule Inhalation TID    heparin (porcine)  5,000 Units SubCUTAneous 3 times per day    methocarbamol  750 mg Oral TID    senna  1 tablet Oral BID    atorvastatin  40 mg Oral Daily    vitamin D  50,000 Units Oral Weekly    levothyroxine  125 mcg Oral Daily    cetirizine  10 mg Oral Daily    mirtazapine  30 mg Oral Nightly    nadolol  20 mg Oral Daily    pantoprazole  40 mg Oral QAM AC    sertraline  200 mg Oral Daily    Pirfenidone  801 mg Oral TID WC     omeprazole, oxyCODONE-acetaminophen, oxyCODONE-acetaminophen, ondansetron, ipratropium-albuterol, acetaminophen, albuterol sulfate HFA, benzonatate, furosemide, hydrOXYzine HCl, naloxone, bisacodyl  IV Drips/Infusions    Vitals    Vitals    height is 5' 2\" (1.575 m) and weight is 128 lb 12 oz (58.4 kg). Her oral temperature is 98.5 °F (36.9 °C). Her blood pressure is 111/55 (abnormal) and her pulse is 82. Her respiration is 16 and oxygen saturation is 95%.      O2 Flow Rate (L/min): 3 L/min  I/O    Intake/Output Summary (Last 24 hours) at 8/24/2022 1414  Last data filed at 8/23/2022 2324  Gross per 24 hour   Intake 360 ml   Output --   Net 360 ml     Patient Vitals for the past 96 hrs (Last 3 readings):   Weight   08/23/22 0637 128 lb 12 oz (58.4 kg)   08/22/22 0615 129 lb 1.6 oz (58.6 kg)   08/21/22 0500 128 lb 6.4 oz (58.2 kg)     Exam   Constitutional: Patient appears mentally ill, thin. Head: Normocephalic and atraumatic. Mouth/Throat: Oropharynx is clear and moist.  No oral thrush. Eyes: Conjunctivae are normal. Pupils are equal, round, and reactive to light. No scleral icterus. Neck: Neck supple. No JVD or tracheal deviation present. Cardiovascular: Regular rate, regular rhythm, S1 and S2 with no murmur. No peripheral edema  Pulmonary/Chest: Normal effort no increased work of breathing. No stridor. No respiratory distress. Crackles bilaterally. Abdominal: Soft. Bowel sounds audible. No distension or tenderness to palp  Musculoskeletal: Moves all extremities  Lymphadenopathy:  No cervical adenopathy. Neurological: Patient is alert and oriented to person, place, and time. Skin: Skin is warm and dry. Labs   ABG  No results found for: PH, PO2, PCO2, HCO3, O2SAT  No results found for: TONI Lui  CBC  Recent Labs     08/22/22  0638 08/24/22  0648   WBC 8.6 7.5   RBC 3.23* 2.96*   HGB 8.8* 8.2*   HCT 27.8* 25.6*   MCV 85.9 86.5   MCH 27.3 27.6   MCHC 31.8 31.9   RDW 17.6* 17.9*    194   MPV 7.0 7.2      BMP  Recent Labs     08/22/22  0638 08/23/22  1014 08/24/22  0648    136 137   K 3.3* 3.6 3.9    98* 101   CO2 30 30 33*   BUN 7 10 9   CREATININE <0.5* <0.5* <0.5*   GLUCOSE 125* 124* 102*   MG 1.60* 1.60*  --    CALCIUM 7.7* 9.0 7.8*     LFT  No results for input(s): AST, ALT, ALB, BILITOT, ALKPHOS, LIPASE in the last 72 hours. Invalid input(s):   AMYLASE  TROP  No results found for: TROPONINT  BNP  Lab Results   Component Value Date/Time    PROBNP 832 07/14/2022 04:00 PM    PROBNP 721 07/04/2022 02:20 AM    PROBNP 7,611 05/06/2022 09:34 AM     D-Dimer  Lab Results   Component Value Date/Time    DDIMER 0.72 07/15/2022 09:47 AM     Lactic Acid  No results for input(s): LACTA in the last 72 hours. INR  No results for input(s): INR, PROTIME in the last 72 hours. PTT  No results for input(s): APTT in the last 72 hours. Glucose  Recent Labs     08/22/22  1136 08/22/22  1607 08/23/22  1107   POCGLU 135* 139* 124*     UA No results for input(s): SPECGRAV, PHUR, COLORU, CLARITYU, MUCUS, PROTEINU, BLOODU, RBCUA, WBCUA, BACTERIA, NITRU, GLUCOSEU, BILIRUBINUR, UROBILINOGEN, KETUA, LABCAST, LABCASTTY, AMORPHOS in the last 72 hours. Invalid input(s): CRYSTALS. Radiology       XR CHEST PORTABLE [[id number]] Collected: 08/24/22 1331     Order Status: Completed Updated: 08/24/22 1334     Narrative:       EXAMINATION:   ONE XRAY VIEW OF THE CHEST     8/24/2022 12:00 pm     COMPARISON:   07/14/2022 radiograph     HISTORY:   ORDERING SYSTEM PROVIDED HISTORY: hypoxia, history of ILD   TECHNOLOGIST PROVIDED HISTORY:   Reason for exam:->hypoxia, history of ILD     FINDINGS:   The heart is enlarged. Mediastinum is normal.  A diffuse pattern of   reticular airspace change is seen throughout both lungs. Overall low lung   volume. No focal consolidation. No significant skeletal finding. Impression:       Chronic pattern of interstitial lung disease better characterized on prior   cross-sectional imaging. No acute finding or detrimental change. XR ABDOMEN (KUB) (SINGLE AP VIEW) [[id number]] Collected: 08/19/22 1635     Order Status: Completed Updated: 08/19/22 1639     Narrative:       EXAMINATION:   ONE SUPINE XRAY VIEW(S) OF THE ABDOMEN     8/19/2022 3:35 pm     COMPARISON:   None.      HISTORY:   ORDERING SYSTEM PROVIDED HISTORY: Please eval stool burden   TECHNOLOGIST PROVIDED HISTORY:   Reason for exam:->Please eval stool burden   Reason for Exam: eval stool burden     FINDINGS:   Nonobstructive bowel gas pattern. No significant stool burden. Air is seen   distally into the rectum. Intact spinal fusion hardware. Interstitial lung   disease. Impression:       Nonobstructive bowel gas pattern with no significant stool burden. (See actual reports for details)    Assessment   Interstitial lung disease, not in exacerbation  Chronic respiratory failure with hypoxia on oxygen 3 L/min at baseline  Status post T11-L3 PDFF with L1 corpectomy  Paroxysmal atrial fibrillation  Diabetes mellitus type 2  Hypertension  Hyperlipidemia  Hypothyroidism    Recommendations   The patient does not have symptoms of pneumonia. Normal WBC, no fever, no new cough. Chest x-ray showed chronic changes with no acute finding. Patient had desaturation during activity which is expected for her. Recommended to use oxygen at 3 L/min nasal cannula and increase it to 6 L/min with activity. The patient does not appear to have ILD exacerbation. No indication for steroids. Continue physical therapy. Continue pirfenidone. Patient had vomiting with pirfenidone before. She wants to try it again. Okay to continue. Okay to hold it if the patient develops side effects like vomiting. Explained to the patient that pirfenidone does not improve her symptoms, it is more to decrease the rate of deterioration of lung function. DuoNeb as needed. Deep breathing. Discussed with the patient and nursing staff. Thank you for the consult and allowing us to participate in the care of your patient. Please call for questions. Case discussed with nurse and patient  Questions and concerns addressed. Meds and Orders reviewed.     Electronically signed by Karthik Kirkland MD on 8/24/2022 at 2:14 PM

## 2022-08-24 NOTE — PROGRESS NOTES
Speech Language Pathology  [facility]: ACUTE REHAB UNIT  SPEECH-LANGUAGE PATHOLOGY      [x] Daily  [] Weekly Care Conference Note  [] Discharge    Christ Holland      :1955  IVQ:[id number]  Rehab Dx/Hx: H/O Spinal surgery [Z98.890]    Precautions: falls  Home situation: lives with , dtr manages meds, shares finances with ,  does the cooking, cleaning, laundry, yardwork, not an active    ST Dx: [] Aphasia  [] Dysarthria  [] Apraxia   [x] Oropharyngeal dysphagia [x] Cognitive Impairment  [] Other:   Date of Admit: 2022  Room #: 0162/0162-01    Current functional status (updated daily):         Pt being seen for : [] Speech/Language Treatment  [x] Dysphagia Treatment [x] Cognitive Treatment  [] Other:  Communication: [x]WFL  [] Aphasia  [] Dysarthria  [] Apraxia  [] Pragmatic Impairment [] Non-verbal  [] Hearing Loss  [] Other:   Cognition: [] WFL  [x] Mild  [] Moderate  [] Severe [] Unable to Assess  [] Other:  Memory: [] WFL  [x] Mild  [] Moderate  [] Severe [] Unable to Assess  [] Other:  Behavior: [x] Alert  [x] Cooperative  [x]  Pleasant  [] Confused  [] Agitated  [] Uncooperative  [] Distractible [] Motivated  [] Self-Limiting [] Anxious  [] Other:  Endurance:  [x] Adequate for participation in SLP sessions  [] Reduced overall  [] Lethargic  [] Other:  Safety: [x] No concerns at this time  [] Reduced insight into deficits  []  Reduced safety awareness [] Not following call light procedures  [] Unable to Assess  [] Other:    Current Diet Order:ADULT DIET;  Regular  ADULT ORAL NUTRITION SUPPLEMENT; Breakfast, Lunch, Dinner; Standard High Calorie/High Protein Oral Supplement  Swallowing Precautions: upright for all intake, stay upright for at least 30 mins after intake, small bites/sips, alternate bites/sips, slow rate of intake, general GERD precautions, and general aspiration precautions        Date: 2022      Tx session 1  1430 - 1500 Tx session 2  All tx needs met in session 1    Total Timed Code Min 15    Total Treatment Minutes 30    Individual Treatment Minutes 30    Group Treatment Minutes 0    Co-Treat Minutes 0    Variance/Reason:  0    Pain Back pain    Pain Intervention [x] RN notified / gave meds  [] Repositioned  [] Intervention offered and patient declined  [] N/A  [] Other:    [] RN notified  [] Repositioned  [] Intervention offered and patient declined  [] N/A  [] Other:   Subjective     Pt alert and cooperative, agreeable to tx. Pt upright in bedside chair for session. Objective:  Goals           Goal met 08/22/22. Pt tolerating IDDSI level 7 regular solids, thin liquids, meds whole with water 1 pill at a time as LRD. Goal met 08/22/22. Cog Goals:  Short-term Goals  Timeframe for Short-term *goals extended through 08/26/22*    Goal 1: Pt will complete graded recall tasks using compensatory strategies with 90% acc given min cues    Sorting and Remembering Categories  -pt given 16 items that could be sorted into 4 groups  -edu re: recall strategies - repetition, association, grouping/chunking  -immediate recall: pt recalled 14/16 items indep; +2 given min cues  -10 min delay: pt recalled 15/16 indep; +1 given min cues       Goal 2: Pt will complete executive function tasks (e.g. money, time, etc) with 90% acc given min cues      Goal not directly targeted. Goal 3: Pt will complete higher level critical thinking tasks with 90% acc given min cues   Goal not directly targeted.      Goal 4: Pt will complete respiratory retraining exercises to improve overall breath support with speech 10/10 given min cues     Respiratory retraining exercises:  -exhale while sustaining the sound /sh/: x5  -exhale while sustaining the sound /f/: x5  -exhale while sustaining the sound /z/: x5  -produce a pulse of air using the sound /ha/ then sniff through nose: x5  -produce a pulse of air using the sound /sha/ then sniff through nose: x5  -breath in nose and out Pathologist

## 2022-08-24 NOTE — PROGRESS NOTES
Jeanne Carter  8/24/2022  [id number]    Chief Complaint: H/O Spinal surgery    Subjective:   No acute events overnight. Today Bertrand Hernández is seen in her room with nursing present. She reports continued nausea in the morning. She continues to have difficulty taking all of her medications. She has been eating better. Nursing and therapy notes rectal prolapse and rectal bleeding. Consult placed to GI. Hb has been stable. Also with desaturations noted in therapy and requirement of 4 L O2. XR chest showing chronic findings. Consult made to Pulm. ROS: denies f/c, n/v, cp, sob    Objective:  Patient Vitals for the past 24 hrs:   BP Temp Temp src Pulse Resp SpO2   08/24/22 1230 -- -- -- -- 16 --   08/24/22 1200 -- -- -- -- 16 95 %   08/24/22 1130 -- -- -- -- -- (!) 80 %   08/24/22 0935 -- -- -- 82 -- --   08/24/22 0745 (!) 111/55 98.5 °F (36.9 °C) Oral 84 16 98 %   08/24/22 0723 -- -- -- -- -- 95 %   08/23/22 2045 -- -- -- 86 16 96 %   08/23/22 2030 109/61 98.5 °F (36.9 °C) Axillary 86 18 94 %     Gen: No distress, pleasant. Lying on side in bed  HEENT: Normocephalic, atraumatic. CV: extremities well perfused  Resp: No respiratory distress. On 3 L NC  Abd: Soft, nondistended  Ext: No edema. Skin: back incision healing well without erythema or drainage  Neuro: Alert, oriented, appropriately interactive.  Speech fluent without aphasia  Psych: appropriate mood and affect    Wt Readings from Last 3 Encounters:   08/23/22 128 lb 12 oz (58.4 kg)   08/08/22 132 lb (59.9 kg)   08/06/22 132 lb (59.9 kg)       Laboratory data:   Lab Results   Component Value Date    WBC 7.5 08/24/2022    HGB 8.2 (L) 08/24/2022    HCT 25.6 (L) 08/24/2022    MCV 86.5 08/24/2022     08/24/2022       Lab Results   Component Value Date/Time     08/24/2022 06:48 AM    K 3.9 08/24/2022 06:48 AM     08/24/2022 06:48 AM    CO2 33 08/24/2022 06:48 AM    BUN 9 08/24/2022 06:48 AM    CREATININE <0.5 08/24/2022 06:48 AM    GLUCOSE 102 08/24/2022 06:48 AM    CALCIUM 7.8 08/24/2022 06:48 AM        Therapy progress:  PT  Position Activity Restriction  Spinal Precautions: No Bending, No Twisting, No Lifting  Spinal Precautions: TLSO when OOB, can jamila EOB; no lifting >10 lbs  Other position/activity restrictions: 3L O2, TLSO, spinal precautions, ok to shower. Objective     Sit to Stand: Minimal Assistance (up to RW with increased time)  Stand to sit: Minimal Assistance  Bed to Chair: Minimal assistance, Moderate assistance (using RW via stand pivot transfer)     OT  PT Equipment Recommendations  Other: TBD pending progress- pt has 4WW and w/c at home  Toilet - Technique: Stand step, To left, To right (grab bar vs RW)  Equipment Used: Standard toilet  Toilet Transfers Comments: assist with lifting and lowering, would benefit from [facility] over toilet with RW vs grab bar for support  Assessment        SLP                Body mass index is 23.55 kg/m². Assessment and Plan: Isauro Sherwood is a 69year old female with a past medical history significant for chronic respiratory failure on 3 L home O2, ILD, COPD, paroxysmal atrial fibrillation, DM2, HTN, HLD, and thyroid disease who presented to  on 8/11/22 for planned T11-L3 PDFF with L1 corpectomy for L1 burst fracture. She was admitted to [facility] on 8/16/22 due to functional deficits below her baseline. L1 Burst Fracture  - s/p T11-L3 PDFF with L1 corpectomy  - TLSO when OOB  - pain control  - staples removed 8/24  - incision open to air  - PT, OT     Chronic Respiratory Failure  - on 3 L home O2 at baseline  - now with increased oxygen requirement  - XR chest negative for acute process  - she has not been regularly taking ILD med  - consult Pulm, she follows with them outpatient   - wean oxygen for goal SpO2>88%  - humidified air     Hypokalemia, resolved  - resolved without taking supplement.  Was unable to tolerate due to upsetting her stomach  - continue to monitor intermittently      ILD/COPD  - pifenidone TID  - scheduled duo-nebs  - follows with [facility], consulted    Possible Rectal Prolapse with bleeding  - consult to GI     pAF  - not been on [facility]  - monitor     DM2  - on januvia and jardiance at home, family brought in home meds  - glucose has been low due to poor PO intake, hold off on resuming home meds     Hypothyroidism  - synthroid     GERD  - PPI  - PRN omeprazole per home regimen     Anxiety/Depression  - zoloft, mirtazipine     HLD  - statin      Bowels: Schedule stool softener. Follow bowel movements. Enema or suppository if needed. Bladder: Check PVR x 3. [facility] if PVR > 200ml or if any volume is > 500 ml. Sleep: Trazodone provided prn. Follow up appointments: Ortho Spine, PCP, Pulm    Services: home health PT, OT, nursing  EDOD: 8/31/22    Interdisciplinary team conference was held today with entire rehab treatment team including PT, OT, SLP, Dietician, RN, and SW. Discussion focused on progress toward rehab goals and discharge planning. Barriers: hypoxia, rectal bleeding, endurance, pain. Total treatment time >35 min with greater than 50% spent in care coordination. [facility] G[facility] MD 8/24/2022, 2:17 PM

## 2022-08-24 NOTE — CONSULTS
Consultation Note    Patient Name: Ella High  : 1955  Age: 69 y.o. Admitting Physician: Americo Schmitz MD   Date of Admission: 2022  5:10 PM   Primary Care Physician: Chapis Barbosa MD        Ella High is being seen at the request of Americo Schmitz MD for rectal prolapse. History of Present Illness:  69year old F with PMH significant for A Fib, Anxiety, depression, GERD, hyperlipidemia, and hypothyroidism. Abdominal surgeries include hysterectomy and cholecystectomy. Patient admitted to acute rehab after back surgery. Today she noted hematochezia after having BM. She noted something protruding from her rectum and this was thought to be her rectum. She denies any prior history of prolapse or rectal bleeding. Complains of diarrhea, but this is the first she has ever seen any blood. Reports last colonoscopy more than 5 years ago, per her report this exam was normal. Denies NSAID use. GI History:  2011 Flexible sigmoidoscopy  Findings:  - stricture of the mid to proximal rectum   - diverticulosis     Past Medical History:  Past Medical History:   Diagnosis Date    A-fib ([address]     Anxiety     Cryptogenic organizing pneumonia ([address]     Depression     GERD (gastroesophageal reflux disease)     Hyperlipidemia     On home oxygen therapy     uses O2 3L prn    Rash     Thyroid disease     hypothyroidism        Past Surgical History:  Past Surgical History:   Procedure Laterality Date    ARM SURGERY Left 3/2/2020    LEFT ULNAR NERVE DECOMPRESSION AT THE ELBOW performed by Sylvain Pacheco MD at [facility] N/A 2019    EBUS WF W/ANES.  performed by Beth Mix MD at [place] [address]  2019    BRONCHOSCOPY/TRANSBRONCHIAL NEEDLE BIOPSY performed by Beth Mix MD at [place] [address]  2019    BRONCHOSCOPY/TRANSBRONCHIAL LUNG BIOPSY performed by Beth Mix MD at [place] [address] 6/27/2019    BRONCHOSCOPY ALVEOLAR LAVAGE performed by Lenin Walsh MD at [address]  07/10/2019    BRONCHOSCOPY N/A 7/10/2019    BRONCHOSCOPY ALVEOLAR LAVAGE performed by Genaro Rico MD at [address] Bilateral 08/06/2019    BRONCHOSCOPY N/A 8/6/2019    BRONCHOSCOPY ALVEOLAR LAVAGE performed by Alecia Vidal MD at [address] N/A 12/24/2019    BRONCHOSCOPY ALVEOLAR LAVAGE performed by Jarad Ratliff MD at [place] [id number], TOTAL ABDOMINAL (CERVIX REMOVED)      partial        Historical Medications:  Prior to Visit Medications    Medication Sig Taking? Authorizing Provider   albuterol sulfate HFA (PROVENTIL;VENTOLIN;PROAIR) 108 (90 Base) MCG/ACT inhaler INHALE 2 PUFFS INTO THE LUNGS EVERY 6 HOURS AS NEEDED FOR WHEEZING  Ani Kaplan MD   Pirfenidone 267 MG TABS Take 3 tablets by mouth in the morning, at noon, and at bedtime  Historical Provider, MD   atorvastatin (LIPITOR) 40 MG tablet Take 1 tablet by mouth daily  Charmaine Martinez MD   levothyroxine (SYNTHROID) 125 MCG tablet Take 1 tablet by mouth Daily TAKE 1 TABLET BY MOUTH EVERY DAY  Charmaine Martinez MD   sertraline (ZOLOFT) 100 MG tablet Take 2 tablets by mouth daily  Charmaine Martinez MD   mirtazapine (REMERON) 30 MG tablet Take 1 tablet by mouth nightly  Charmaine Martinez MD   nadolol (CORGARD) 20 MG tablet Take 1 tablet by mouth daily  Charmaine Martinez MD   pantoprazole (PROTONIX) 40 MG tablet Take 1 tablet by mouth every morning (before breakfast)  Charmaine Martinez MD   empagliflozin (JARDIANCE) 25 MG tablet Take 1 tablet by mouth daily  Charmaine Martinez MD   loratadine-pseudoephedrine ([employer] ALLERGY RELIEF-D12) 5-120 MG per extended release tablet TAKE 1 TABLET BY MOUTH TWICE A DAY  Charmaine Martinez MD   traMADol (ULTRAM) 50 MG tablet Take 1-2 tablets by mouth every 6 hours as needed for Pain for up to 180 days.  Take lowest dose possible to manage pain Shadi Ashley MD   benzonatate (TESSALON) 200 MG capsule TAKE 1 CAPSULE BY MOUTH 3 TIMES A DAY AS NEEDED FOR COUGH  Shadi Ashley MD   SITagliptin (JANUVIA) 100 MG tablet TAKE 1 TABLET BY MOUTH EVERY DAY  Shadi Ashley MD   hydrOXYzine (ATARAX) 10 MG tablet Take 1 tablet by mouth every 8 hours as needed for Itching TAKE 1 TO 3 TABLETS BY MOUTH 3 TIMES DAILY AS NEEDED FOR ITCHING  PANDA Kuhn CNP   furosemide (LASIX) 40 MG tablet Take 1 tablet by mouth daily  PANDA Kuhn CNP   acetaminophen (TYLENOL) 325 MG tablet   Historical Provider, MD   guaiFENesin (MUCINEX) 600 MG extended release tablet Take 1,200 mg by mouth every morning  Historical Provider, MD   potassium chloride (KLOR-CON) 10 MEQ extended release tablet Take 20 mEq by mouth daily as needed  Historical Provider, MD   LORazepam (ATIVAN) 0.5 MG tablet Take 0.5 mg by mouth every 6 hours as needed for Anxiety. Historical Provider, MD   albuterol (PROVENTIL) (2.5 MG/3ML) 0.083% nebulizer solution INHALE THE CONTENTS OF 1 VIAL VIA NEBULIZER EVERY 6 HOURS AS NEEDED FOR WHEEZING  Shadi Ashley MD   Blood Glucose Monitoring Suppl (TRUE METRIX METER) PUJA Test daily and as needed  Shadi Ashley MD   blood glucose test strips (ASCENSIA AUTODISC VI;ONE TOUCH ULTRA TEST VI) strip 1 each by In Vitro route daily As needed.   Shadi Ashley MD   TRUEplus Lancets 33G MISC Use daily  Shadi Ashley MD   Blood Glucose Calibration (TRUE METRIX LEVEL 1) Low SOLN use as needed  Shadi Ashley MD   INSULIN SYRINGE 1CC/29G 29G X 1/2\" 1 ML MISC 1 each by Does not apply route 2 times daily  Shadi Ashley MD   OXYGEN Inhale 3 L into the lungs  Historical Provider, MD        Hospital Medications:  Current Facility-Administered Medications: senna (SENOKOT) tablet 8.6 mg, 1 tablet, Oral, Daily PRN  magnesium oxide (MAG-OX) tablet 400 mg, 400 mg, Oral, Daily  lidocaine 4 % external patch 1 patch, 1 patch, TransDERmal, Daily  omeprazole EC tablet 20 mg (Patient Supplied), 20 mg, Oral, Daily PRN  lidocaine 4 % external patch 1 patch, 1 patch, TransDERmal, Daily  oxyCODONE-acetaminophen (PERCOCET) 5-325 MG per tablet 1 tablet, 1 tablet, Oral, Q4H PRN  oxyCODONE-acetaminophen (PERCOCET) 5-325 MG per tablet 2 tablet, 2 tablet, Oral, Q4H PRN  ondansetron (ZOFRAN-ODT) disintegrating tablet 4 mg, 4 mg, Oral, Q4H PRN  ipratropium-albuterol (DUONEB) nebulizer solution 1 ampule, 1 ampule, Inhalation, TID  ipratropium-albuterol (DUONEB) nebulizer solution 1 ampule, 1 ampule, Inhalation, Q4H PRN  heparin (porcine) injection 5,000 Units, 5,000 Units, SubCUTAneous, 3 times per day  methocarbamol (ROBAXIN) tablet 750 mg, 750 mg, Oral, TID  acetaminophen (TYLENOL) tablet 1,000 mg, 1,000 mg, Oral, Q8H PRN  albuterol sulfate HFA (PROVENTIL;VENTOLIN;PROAIR) 108 (90 Base) MCG/ACT inhaler 2 puff, 2 puff, Inhalation, Q6H PRN  atorvastatin (LIPITOR) tablet 40 mg, 40 mg, Oral, Daily  benzonatate (TESSALON) capsule 100 mg, 100 mg, Oral, TID PRN  vitamin D (ERGOCALCIFEROL) capsule 50,000 Units, 50,000 Units, Oral, Weekly  furosemide (LASIX) tablet 40 mg, 40 mg, Oral, Daily PRN  hydrOXYzine HCl (ATARAX) tablet 10 mg, 10 mg, Oral, TID PRN  levothyroxine (SYNTHROID) tablet 125 mcg, 125 mcg, Oral, Daily  cetirizine (ZYRTEC) tablet 10 mg, 10 mg, Oral, Daily  mirtazapine (REMERON) tablet 30 mg, 30 mg, Oral, Nightly  nadolol (CORGARD) tablet 20 mg, 20 mg, Oral, Daily  naloxone (NARCAN) injection 0.4 mg, 0.4 mg, IntraVENous, PRN  pantoprazole (PROTONIX) tablet 40 mg, 40 mg, Oral, QAM AC  sertraline (ZOLOFT) tablet 200 mg, 200 mg, Oral, Daily  bisacodyl (DULCOLAX) suppository 10 mg, 10 mg, Rectal, Daily PRN  Pirfenidone TABS 801 mg (Patient Supplied), 801 mg, Oral, TID WC     Social History:   Social History       Tobacco History       Smoking Status  Never      Smokeless Tobacco Use  Never              Alcohol History       Alcohol Use Status  No              Drug Use       Drug Use Status  No Sexual Activity       Sexually Active  Not Currently                     Family History:  Family History   Problem Relation Age of Onset    Diabetes Mother     High Blood Pressure Mother     Asthma Sister     Other Father         Allergies: Allergies   Allergen Reactions    Penicillins Anaphylaxis     Tolerates Meropenem. Cefuroxime      Tolerates Meropenem. Doxycycline Hives    Imitrex [Sumatriptan] Other (See Comments)     Feels like pins and needles    Meperidine     Other Other (See Comments)     Blood pressure drops, light headed    Sulfa Antibiotics Hives    Keflex [Cephalexin] Itching and Rash     Tolerates Meropenem. Tape Norma Edinger Tape] Rash        ROS:   General: No fever or weight change  Hematologic: No unexpected submucosal bleeding or bruising  HEENT: No sore throat or facial pain  Respiratory: No cough or dyspnea  Cardiovascular: No angina or dependent edema  Gastrointestinal: See HPI  Musculoskeletal: No usual joint pain or stiffness  Skin: No skin eruptions or changing lesions  Neurologic: No focal weakness or numbness  Psychiatric: No anxiety or sleep disturbance    Physical Exam:  Vital Signs:   Vitals:    08/25/22 0800   BP: 116/84   Pulse: 100   Resp: 18   Temp: 97.6 °F (36.4 °C)   SpO2: 96%       General: Well-nourished, well-developed  HEENT: Sclera anicteric, mucosal membranes moist  Cardiovascular: Regular rate and rhythm. No murmurs. Respiratory: Respirations nonlabored, no crepitus  GI: Abdomen nondistended, soft, and nontender. Normal active bowel sounds. No masses palpable. Rectal: Bright red blood noted on depends. No rectal prolapse seen. No external hemorrhoids. No masses or lesions palpated. Musculoskeletal: No pitting edema of the lower legs. Neurological: Gross memory appears intact.   Patient is alert and oriented      Recent Imaging:   XR CHEST PORTABLE  Narrative: EXAMINATION:  ONE XRAY VIEW OF THE CHEST    8/24/2022 12:00 pm    COMPARISON:  07/14/2022 radiograph    HISTORY:  ORDERING SYSTEM PROVIDED HISTORY: hypoxia, history of ILD  TECHNOLOGIST PROVIDED HISTORY:  Reason for exam:->hypoxia, history of ILD    FINDINGS:  The heart is enlarged. Mediastinum is normal.  A diffuse pattern of  reticular airspace change is seen throughout both lungs. Overall low lung  volume. No focal consolidation. No significant skeletal finding. Impression: Chronic pattern of interstitial lung disease better characterized on prior  cross-sectional imaging. No acute finding or detrimental change. Labs:   Recent Labs     08/24/22  0648   HGB 8.2*   WBC 7.5        Assessment:    69year old F with PMH significant for A Fib, Anxiety, depression, GERD, hyperlipidemia, and hypothyroidism. Admitted to acute rehab following spinal surgery. Now with rectal bleeding and ?rectal prolapse. Hgb down to 8.2, noted to be 11.3 once month ago. Last colonoscopy seen in chart was 2011--noted to have rectal stricture. Plan:  Monitor H/H and overt signs of GI bleeding  Plan for colonoscopy tomorrow  Clear liquid diet now  NPO after midnight  Bowel prep to start tonight  Supportive care       Lenny Lea MD    [id number]  Also available via [employer]

## 2022-08-25 ENCOUNTER — ANESTHESIA (OUTPATIENT)
Dept: ENDOSCOPY | Age: 67
DRG: 559 | End: 2022-08-25
Payer: MEDICARE

## 2022-08-25 ENCOUNTER — ANESTHESIA EVENT (OUTPATIENT)
Dept: ENDOSCOPY | Age: 67
DRG: 559 | End: 2022-08-25
Payer: MEDICARE

## 2022-08-25 LAB
GLUCOSE BLD-MCNC: 89 MG/DL (ref 70–99)
PERFORMED ON: NORMAL

## 2022-08-25 PROCEDURE — 2500000003 HC RX 250 WO HCPCS: Performed by: NURSE ANESTHETIST, CERTIFIED REGISTERED

## 2022-08-25 PROCEDURE — 7100000010 HC PHASE II RECOVERY - FIRST 15 MIN: Performed by: INTERNAL MEDICINE

## 2022-08-25 PROCEDURE — 3609010600 HC COLONOSCOPY POLYPECTOMY SNARE/COLD BIOPSY: Performed by: INTERNAL MEDICINE

## 2022-08-25 PROCEDURE — 3700000001 HC ADD 15 MINUTES (ANESTHESIA): Performed by: INTERNAL MEDICINE

## 2022-08-25 PROCEDURE — 92507 TX SP LANG VOICE COMM INDIV: CPT

## 2022-08-25 PROCEDURE — 6370000000 HC RX 637 (ALT 250 FOR IP): Performed by: STUDENT IN AN ORGANIZED HEALTH CARE EDUCATION/TRAINING PROGRAM

## 2022-08-25 PROCEDURE — 88305 TISSUE EXAM BY PATHOLOGIST: CPT

## 2022-08-25 PROCEDURE — 2700000000 HC OXYGEN THERAPY PER DAY

## 2022-08-25 PROCEDURE — 6360000002 HC RX W HCPCS: Performed by: NURSE ANESTHETIST, CERTIFIED REGISTERED

## 2022-08-25 PROCEDURE — 94640 AIRWAY INHALATION TREATMENT: CPT

## 2022-08-25 PROCEDURE — 2709999900 HC NON-CHARGEABLE SUPPLY: Performed by: INTERNAL MEDICINE

## 2022-08-25 PROCEDURE — 2580000003 HC RX 258: Performed by: NURSE ANESTHETIST, CERTIFIED REGISTERED

## 2022-08-25 PROCEDURE — 0DBH8ZZ EXCISION OF CECUM, VIA NATURAL OR ARTIFICIAL OPENING ENDOSCOPIC: ICD-10-PCS | Performed by: INTERNAL MEDICINE

## 2022-08-25 PROCEDURE — 6370000000 HC RX 637 (ALT 250 FOR IP): Performed by: INTERNAL MEDICINE

## 2022-08-25 PROCEDURE — 94761 N-INVAS EAR/PLS OXIMETRY MLT: CPT

## 2022-08-25 PROCEDURE — 1280000000 HC REHAB R&B

## 2022-08-25 PROCEDURE — 2580000003 HC RX 258: Performed by: ANESTHESIOLOGY

## 2022-08-25 PROCEDURE — 0DBL8ZX EXCISION OF TRANSVERSE COLON, VIA NATURAL OR ARTIFICIAL OPENING ENDOSCOPIC, DIAGNOSTIC: ICD-10-PCS | Performed by: INTERNAL MEDICINE

## 2022-08-25 PROCEDURE — 97110 THERAPEUTIC EXERCISES: CPT

## 2022-08-25 PROCEDURE — 7100000011 HC PHASE II RECOVERY - ADDTL 15 MIN: Performed by: INTERNAL MEDICINE

## 2022-08-25 PROCEDURE — 6360000002 HC RX W HCPCS: Performed by: STUDENT IN AN ORGANIZED HEALTH CARE EDUCATION/TRAINING PROGRAM

## 2022-08-25 PROCEDURE — 3700000000 HC ANESTHESIA ATTENDED CARE: Performed by: INTERNAL MEDICINE

## 2022-08-25 PROCEDURE — 97530 THERAPEUTIC ACTIVITIES: CPT

## 2022-08-25 RX ORDER — PHENYLEPHRINE HCL IN 0.9% NACL 1 MG/10 ML
SYRINGE (ML) INTRAVENOUS PRN
Status: DISCONTINUED | OUTPATIENT
Start: 2022-08-25 | End: 2022-08-25 | Stop reason: SDUPTHER

## 2022-08-25 RX ORDER — HYDROCORTISONE ACETATE 25 MG/1
25 SUPPOSITORY RECTAL 2 TIMES DAILY
Status: DISCONTINUED | OUTPATIENT
Start: 2022-08-25 | End: 2022-09-02 | Stop reason: HOSPADM

## 2022-08-25 RX ORDER — SODIUM CHLORIDE, SODIUM LACTATE, POTASSIUM CHLORIDE, CALCIUM CHLORIDE 600; 310; 30; 20 MG/100ML; MG/100ML; MG/100ML; MG/100ML
INJECTION, SOLUTION INTRAVENOUS CONTINUOUS PRN
Status: DISCONTINUED | OUTPATIENT
Start: 2022-08-25 | End: 2022-08-25 | Stop reason: SDUPTHER

## 2022-08-25 RX ORDER — PROPOFOL 10 MG/ML
INJECTION, EMULSION INTRAVENOUS PRN
Status: DISCONTINUED | OUTPATIENT
Start: 2022-08-25 | End: 2022-08-25 | Stop reason: SDUPTHER

## 2022-08-25 RX ORDER — SIMETHICONE 20 MG/.3ML
EMULSION ORAL PRN
Status: DISCONTINUED | OUTPATIENT
Start: 2022-08-25 | End: 2022-08-25 | Stop reason: ALTCHOICE

## 2022-08-25 RX ORDER — SODIUM CHLORIDE, SODIUM LACTATE, POTASSIUM CHLORIDE, CALCIUM CHLORIDE 600; 310; 30; 20 MG/100ML; MG/100ML; MG/100ML; MG/100ML
INJECTION, SOLUTION INTRAVENOUS CONTINUOUS
Status: DISCONTINUED | OUTPATIENT
Start: 2022-08-25 | End: 2022-09-02 | Stop reason: HOSPADM

## 2022-08-25 RX ADMIN — IPRATROPIUM BROMIDE AND ALBUTEROL SULFATE 1 AMPULE: 2.5; .5 SOLUTION RESPIRATORY (INHALATION) at 07:53

## 2022-08-25 RX ADMIN — METHOCARBAMOL TABLETS 750 MG: 750 TABLET, COATED ORAL at 10:02

## 2022-08-25 RX ADMIN — PROPOFOL 10 MG: 10 INJECTION, EMULSION INTRAVENOUS at 14:47

## 2022-08-25 RX ADMIN — LIDOCAINE HYDROCHLORIDE 40 MG: 10 INJECTION, SOLUTION INFILTRATION; PERINEURAL at 14:29

## 2022-08-25 RX ADMIN — IPRATROPIUM BROMIDE AND ALBUTEROL SULFATE 1 AMPULE: 2.5; .5 SOLUTION RESPIRATORY (INHALATION) at 20:59

## 2022-08-25 RX ADMIN — Medication 400 MG: at 10:02

## 2022-08-25 RX ADMIN — PROPOFOL 40 MG: 10 INJECTION, EMULSION INTRAVENOUS at 14:29

## 2022-08-25 RX ADMIN — NICOTINE POLACRILEX 20 MG: 4 GUM, CHEWING ORAL at 10:04

## 2022-08-25 RX ADMIN — PROPOFOL 20 MG: 10 INJECTION, EMULSION INTRAVENOUS at 14:31

## 2022-08-25 RX ADMIN — OXYCODONE AND ACETAMINOPHEN 2 TABLET: 5; 325 TABLET ORAL at 19:16

## 2022-08-25 RX ADMIN — CETIRIZINE HYDROCHLORIDE 10 MG: 10 TABLET, FILM COATED ORAL at 10:02

## 2022-08-25 RX ADMIN — HEPARIN SODIUM 5000 UNITS: 5000 INJECTION INTRAVENOUS; SUBCUTANEOUS at 21:12

## 2022-08-25 RX ADMIN — NADOLOL 20 MG: 20 TABLET ORAL at 10:02

## 2022-08-25 RX ADMIN — ATORVASTATIN CALCIUM 40 MG: 40 TABLET, FILM COATED ORAL at 10:02

## 2022-08-25 RX ADMIN — PROPOFOL 20 MG: 10 INJECTION, EMULSION INTRAVENOUS at 14:45

## 2022-08-25 RX ADMIN — HYDROCORTISONE ACETATE 25 MG: 25 SUPPOSITORY RECTAL at 21:12

## 2022-08-25 RX ADMIN — PROPOFOL 20 MG: 10 INJECTION, EMULSION INTRAVENOUS at 14:35

## 2022-08-25 RX ADMIN — MIRTAZAPINE 30 MG: 15 TABLET, FILM COATED ORAL at 21:11

## 2022-08-25 RX ADMIN — Medication 100 MCG: at 14:39

## 2022-08-25 RX ADMIN — Medication 100 MCG: at 14:35

## 2022-08-25 RX ADMIN — PROPOFOL 20 MG: 10 INJECTION, EMULSION INTRAVENOUS at 14:38

## 2022-08-25 RX ADMIN — SODIUM CHLORIDE, SODIUM LACTATE, POTASSIUM CHLORIDE, AND CALCIUM CHLORIDE: .6; .31; .03; .02 INJECTION, SOLUTION INTRAVENOUS at 14:24

## 2022-08-25 RX ADMIN — PROPOFOL 20 MG: 10 INJECTION, EMULSION INTRAVENOUS at 14:32

## 2022-08-25 RX ADMIN — PROPOFOL 10 MG: 10 INJECTION, EMULSION INTRAVENOUS at 14:41

## 2022-08-25 RX ADMIN — METHOCARBAMOL TABLETS 750 MG: 750 TABLET, COATED ORAL at 21:11

## 2022-08-25 RX ADMIN — SODIUM CHLORIDE, POTASSIUM CHLORIDE, SODIUM LACTATE AND CALCIUM CHLORIDE: 600; 310; 30; 20 INJECTION, SOLUTION INTRAVENOUS at 14:02

## 2022-08-25 RX ADMIN — SERTRALINE HYDROCHLORIDE 200 MG: 50 TABLET ORAL at 10:02

## 2022-08-25 ASSESSMENT — PAIN DESCRIPTION - LOCATION
LOCATION: BACK
LOCATION: BACK

## 2022-08-25 ASSESSMENT — PAIN SCALES - GENERAL
PAINLEVEL_OUTOF10: 7
PAINLEVEL_OUTOF10: 6

## 2022-08-25 ASSESSMENT — ENCOUNTER SYMPTOMS: SHORTNESS OF BREATH: 1

## 2022-08-25 ASSESSMENT — PAIN DESCRIPTION - DESCRIPTORS
DESCRIPTORS: ACHING;DISCOMFORT
DESCRIPTORS: STABBING

## 2022-08-25 NOTE — H&P
Refill    albuterol sulfate HFA (PROVENTIL;VENTOLIN;PROAIR) 108 (90 Base) MCG/ACT inhaler INHALE 2 PUFFS INTO THE LUNGS EVERY 6 HOURS AS NEEDED FOR WHEEZING 3 each 1    Pirfenidone 267 MG TABS Take 3 tablets by mouth in the morning, at noon, and at bedtime      atorvastatin (LIPITOR) 40 MG tablet Take 1 tablet by mouth daily 90 tablet 1    levothyroxine (SYNTHROID) 125 MCG tablet Take 1 tablet by mouth Daily TAKE 1 TABLET BY MOUTH EVERY DAY 90 tablet 1    sertraline (ZOLOFT) 100 MG tablet Take 2 tablets by mouth daily 180 tablet 1    mirtazapine (REMERON) 30 MG tablet Take 1 tablet by mouth nightly 90 tablet 1    nadolol (CORGARD) 20 MG tablet Take 1 tablet by mouth daily 90 tablet 1    pantoprazole (PROTONIX) 40 MG tablet Take 1 tablet by mouth every morning (before breakfast) 90 tablet 1    empagliflozin (JARDIANCE) 25 MG tablet Take 1 tablet by mouth daily 90 tablet 1    loratadine-pseudoephedrine ([employer] ALLERGY RELIEF-D12) 5-120 MG per extended release tablet TAKE 1 TABLET BY MOUTH TWICE A DAY 60 tablet 5    traMADol (ULTRAM) 50 MG tablet Take 1-2 tablets by mouth every 6 hours as needed for Pain for up to 180 days.  Take lowest dose possible to manage pain 240 tablet 5    benzonatate (TESSALON) 200 MG capsule TAKE 1 CAPSULE BY MOUTH 3 TIMES A DAY AS NEEDED FOR COUGH 90 capsule 5    SITagliptin (JANUVIA) 100 MG tablet TAKE 1 TABLET BY MOUTH EVERY DAY 90 tablet 1    hydrOXYzine (ATARAX) 10 MG tablet Take 1 tablet by mouth every 8 hours as needed for Itching TAKE 1 TO 3 TABLETS BY MOUTH 3 TIMES DAILY AS NEEDED FOR ITCHING 90 tablet 1    furosemide (LASIX) 40 MG tablet Take 1 tablet by mouth daily 30 tablet 1    acetaminophen (TYLENOL) 325 MG tablet       guaiFENesin (MUCINEX) 600 MG extended release tablet Take 1,200 mg by mouth every morning      potassium chloride (KLOR-CON) 10 MEQ extended release tablet Take 20 mEq by mouth daily as needed      LORazepam (ATIVAN) 0.5 MG tablet Take 0.5 mg by mouth every 6 hours as needed for Anxiety. albuterol (PROVENTIL) (2.5 MG/3ML) 0.083% nebulizer solution INHALE THE CONTENTS OF 1 VIAL VIA NEBULIZER EVERY 6 HOURS AS NEEDED FOR WHEEZING 720 mL 1    Blood Glucose Monitoring Suppl (TRUE METRIX METER) PUJA Test daily and as needed 1 each 0    blood glucose test strips (ASCENSIA AUTODISC VI;ONE TOUCH ULTRA TEST VI) strip 1 each by In Vitro route daily As needed. 100 each 3    TRUEplus Lancets 33G MISC Use daily 100 each 3    Blood Glucose Calibration (TRUE METRIX LEVEL 1) Low SOLN use as needed 1 each 2    INSULIN SYRINGE 1CC/29G 29G X 1/2\" 1 ML MISC 1 each by Does not apply route 2 times daily 100 each 5    OXYGEN Inhale 3 L into the lungs          Allergies:  Penicillins, Cefuroxime, Doxycycline, Imitrex [sumatriptan], Meperidine, Other, Sulfa antibiotics, Keflex [cephalexin], and Tape [adhesive tape]      Social History:   Social History     Tobacco Use    Smoking status: Never    Smokeless tobacco: Never   Substance Use Topics    Alcohol use: No     Family History:   Family History   Problem Relation Age of Onset    Diabetes Mother     High Blood Pressure Mother     Asthma Sister     Other Father        PHYSICAL EXAM:      /77   Pulse 79   Temp 97.6 °F (36.4 °C) (Oral)   Resp 18   Ht 5' 2\" (1.575 m)   Wt 128 lb 12 oz (58.4 kg)   SpO2 99%   BMI 23.55 kg/m²  I        Heart:   RRR, normal s1s2    Lungs:  CTA bilat,  Normal effort    Abdomen:   NT, ND      ASA Grade:  ASA 4 - Patient with severe systemic disease that is a constant threat to life    Mallampati Class: 2          ASSESSMENT AND PLAN:    1. Patient is a 69 y.o. female here for Colonoscopy with MAC.   2.  Procedure options, risks and benefits reviewed with patient. Patient expresses understanding.     Tosin Cooney MD,   [id number]  8/25/2022

## 2022-08-25 NOTE — ANESTHESIA PRE PROCEDURE
Department of Anesthesiology  Preprocedure Note       Name:  Emili Thompson   Age:  69 y.o.  :  1955                                          MRN:  [id number]         Date:  2022      Surgeon: Karlene Brewer):  Raz Moreira MD    Procedure: Procedure(s):  COLONOSCOPY DIAGNOSTIC    Medications prior to admission:   Prior to Admission medications    Medication Sig Start Date End Date Taking? Authorizing Provider   albuterol sulfate HFA (PROVENTIL;VENTOLIN;PROAIR) 108 (90 Base) MCG/ACT inhaler INHALE 2 PUFFS INTO THE LUNGS EVERY 6 HOURS AS NEEDED FOR WHEEZING 22   Ani Mcclure MD   Pirfenidone 267 MG TABS Take 3 tablets by mouth in the morning, at noon, and at bedtime    Historical Provider, MD   atorvastatin (LIPITOR) 40 MG tablet Take 1 tablet by mouth daily 22   Santiago Downs MD   levothyroxine (SYNTHROID) 125 MCG tablet Take 1 tablet by mouth Daily TAKE 1 TABLET BY MOUTH EVERY DAY 22   Santiago Downs MD   sertraline (ZOLOFT) 100 MG tablet Take 2 tablets by mouth daily 22   Santiago Downs MD   mirtazapine (REMERON) 30 MG tablet Take 1 tablet by mouth nightly 22   Santiago Downs MD   nadolol (CORGARD) 20 MG tablet Take 1 tablet by mouth daily 22   Santiago Downs MD   pantoprazole (PROTONIX) 40 MG tablet Take 1 tablet by mouth every morning (before breakfast) 22   Santiago Downs MD   empagliflozin (JARDIANCE) 25 MG tablet Take 1 tablet by mouth daily 22   Santiago Downs MD   loratadine-pseudoephedrine ([employer] ALLERGY RELIEF-D12) 5-120 MG per extended release tablet TAKE 1 TABLET BY MOUTH TWICE A DAY 22   Santiago Downs MD   traMADol (ULTRAM) 50 MG tablet Take 1-2 tablets by mouth every 6 hours as needed for Pain for up to 180 days.  Take lowest dose possible to manage pain 22  Santiago Downs MD   benzonatate (TESSALON) 200 MG capsule TAKE 1 CAPSULE BY MOUTH 3 TIMES A DAY AS NEEDED FOR COUGH 22   Santiago Downs MD   SITagliptin (JANUVIA) 100 MG tablet TAKE 1 TABLET BY MOUTH EVERY DAY 5/27/22   Doe Morales MD   hydrOXYzine (ATARAX) 10 MG tablet Take 1 tablet by mouth every 8 hours as needed for Itching TAKE 1 TO 3 TABLETS BY MOUTH 3 TIMES DAILY AS NEEDED FOR ITCHING 5/8/22   PANDA Joel CNP   furosemide (LASIX) 40 MG tablet Take 1 tablet by mouth daily 5/9/22   PANDA Joel CNP   acetaminophen (TYLENOL) 325 MG tablet  4/21/22   Historical Provider, MD   guaiFENesin (MUCINEX) 600 MG extended release tablet Take 1,200 mg by mouth every morning    Historical Provider, MD   potassium chloride (KLOR-CON) 10 MEQ extended release tablet Take 20 mEq by mouth daily as needed    Historical Provider, MD   LORazepam (ATIVAN) 0.5 MG tablet Take 0.5 mg by mouth every 6 hours as needed for Anxiety. Historical Provider, MD   albuterol (PROVENTIL) (2.5 MG/3ML) 0.083% nebulizer solution INHALE THE CONTENTS OF 1 VIAL VIA NEBULIZER EVERY 6 HOURS AS NEEDED FOR WHEEZING 2/7/22   Doe Morales MD   Blood Glucose Monitoring Suppl (TRUE METRIX METER) PUJA Test daily and as needed 9/10/21   Doe Morales MD   blood glucose test strips (ASCENSIA AUTODISC VI;ONE TOUCH ULTRA TEST VI) strip 1 each by In Vitro route daily As needed.  9/10/21   Doe Morales MD   TRUEplus Lancets 33G MISC Use daily 9/10/21   Doe Morales MD   Blood Glucose Calibration (TRUE METRIX LEVEL 1) Low SOLN use as needed 9/9/21   Doe Morales MD   INSULIN SYRINGE 1CC/29G 29G X 1/2\" 1 ML MISC 1 each by Does not apply route 2 times daily 3/6/20   Doe Morales MD   OXYGEN Inhale 3 L into the lungs 7/14/19   Historical Provider, MD       Current medications:    Current Facility-Administered Medications   Medication Dose Route Frequency Provider Last Rate Last Admin    senna (SENOKOT) tablet 8.6 mg  1 tablet Oral Daily PRN Ellie Cheney MD        magnesium oxide (MAG-OX) tablet 400 mg  400 mg Oral Daily Ellie Cheney MD   400 mg at 08/25/22 1002    lidocaine 4 % external patch 1 patch  1 patch TransDERmal Daily Aury Prakash MD   1 patch at 08/25/22 1002    omeprazole EC tablet 20 mg (Patient Supplied)  20 mg Oral Daily PRN Aury Prakash MD   20 mg at 08/25/22 1004    lidocaine 4 % external patch 1 patch  1 patch TransDERmal Daily Aury Prakash MD   1 patch at 08/25/22 1003    oxyCODONE-acetaminophen (PERCOCET) 5-325 MG per tablet 1 tablet  1 tablet Oral Q4H PRN Aury Prakash MD   1 tablet at 08/24/22 1200    oxyCODONE-acetaminophen (PERCOCET) 5-325 MG per tablet 2 tablet  2 tablet Oral Q4H PRN Aury Prakash MD   2 tablet at 08/24/22 1941    ondansetron (ZOFRAN-ODT) disintegrating tablet 4 mg  4 mg Oral Q4H PRN Aury Prakash MD   4 mg at 08/24/22 1441    ipratropium-albuterol (DUONEB) nebulizer solution 1 ampule  1 ampule Inhalation TID Aury Prakash MD   1 ampule at 08/25/22 0753    ipratropium-albuterol (DUONEB) nebulizer solution 1 ampule  1 ampule Inhalation Q4H PRN Aury Prakash MD        heparin (porcine) injection 5,000 Units  5,000 Units SubCUTAneous 3 times per day Aury Prakash MD   5,000 Units at 08/24/22 2248    methocarbamol (ROBAXIN) tablet 750 mg  750 mg Oral TID Aury Prakash MD   750 mg at 08/25/22 1002    acetaminophen (TYLENOL) tablet 1,000 mg  1,000 mg Oral Q8H PRN Aury Prakash MD   1,000 mg at 08/17/22 0756    albuterol sulfate HFA (PROVENTIL;VENTOLIN;PROAIR) 108 (90 Base) MCG/ACT inhaler 2 puff  2 puff Inhalation Q6H PRN Aury Prakash MD   2 puff at 08/22/22 1645    atorvastatin (LIPITOR) tablet 40 mg  40 mg Oral Daily Aury Prakash MD   40 mg at 08/25/22 1002    benzonatate (TESSALON) capsule 100 mg  100 mg Oral TID PRN Aury Prakash MD        vitamin D (ERGOCALCIFEROL) capsule 50,000 Units  50,000 Units Oral Weekly Aury Prakash MD        furosemide (LASIX) tablet 40 mg  40 mg Oral Daily PRN Aury Prakash MD  hydrOXYzine HCl (ATARAX) tablet 10 mg  10 mg Oral TID PRN Cate Berg MD   10 mg at 08/19/22 2054    levothyroxine (SYNTHROID) tablet 125 mcg  125 mcg Oral Daily Cate Berg MD   125 mcg at 08/24/22 0505    cetirizine (ZYRTEC) tablet 10 mg  10 mg Oral Daily Cate Berg MD   10 mg at 08/25/22 1002    mirtazapine (REMERON) tablet 30 mg  30 mg Oral Nightly Cate Berg MD   30 mg at 08/24/22 1941    nadolol (CORGARD) tablet 20 mg  20 mg Oral Daily Cate Berg MD   20 mg at 08/25/22 1002    naloxone [facility]) injection 0.4 mg  0.4 mg IntraVENous PRN Cate Begr MD        pantoprazole (PROTONIX) tablet 40 mg  40 mg Oral QAM AC Cate Berg MD   40 mg at 08/24/22 0505    sertraline (ZOLOFT) tablet 200 mg  200 mg Oral Daily Cate Berg MD   200 mg at 08/25/22 1002    bisacodyl (DULCOLAX) suppository 10 mg  10 mg Rectal Daily PRN Cate Berg MD        Pirfenidone TABS 801 mg (Patient Supplied)  801 mg Oral TID WC Cate Berg MD   801 mg at 08/23/22 1619       Allergies: Allergies   Allergen Reactions    Penicillins Anaphylaxis     Tolerates Meropenem.  Cefuroxime      Tolerates Meropenem.  Doxycycline Hives    Imitrex [Sumatriptan] Other (See Comments)     Feels like pins and needles    Meperidine     Other Other (See Comments)     Blood pressure drops, light headed    Sulfa Antibiotics Hives    Keflex [Cephalexin] Itching and Rash     Tolerates Meropenem.     Tape Milana [place] Tape] Rash       Problem List:    Patient Active Problem List   Diagnosis Code    Chest pain R07.9    HTN (hypertension) I10    Hyperlipidemia with target LDL less than 130 E78.5    Hypokalemia E87.6    Insomnia G47.00    Trochanteric bursitis of both hips M70.61, M70.62    Migraine G43.909    Syncope R55    Acute blood loss anemia D62    Fatigue R53.83    Hypothyroidism E03.9    Mild single current episode of major depressive disorder ([facility] F32.0    Anxiety F41.9    Pneumonia of both lower lobes due to infectious organism J18.9    A-fib ([facility]) I48.91    Atypical pneumonia J18.9    Acute bronchitis with bronchospasm J20.9    Acute on chronic diastolic CHF (congestive heart failure) ([facility]) I50.33    Class 2 obesity with body mass index (BMI) of 39.0 to 39.9 in adult E66.9, Z68.39    Abnormal chest CT R93.89    Acute diastolic heart failure ([facility]) I50.31    ILD (interstitial lung disease) ([facility]) J84.9    Acute on chronic respiratory failure with hypoxia ([facility]) J96.21    Restrictive lung disease J98.4    Abnormal CT of the chest R93.89    SOB (shortness of breath) R06.02    Acute cystitis without hematuria N30.00    Shortness of breath R06.02    Chronic respiratory failure with hypoxia ([facility]) J96.11    Cryptogenic organizing pneumonia ([facility]) J84.116    Pneumothorax of left lung after biopsy J95.811    COPD (chronic obstructive pulmonary disease) ([facility]) J44.9    Type 2 diabetes mellitus without complication ([facility]) Y32.0    Hyponatremia E87.1    Numbness and tingling in left hand R20.0, R20.2    Ulnar neuropathy at elbow of left upper extremity G56.22    Acute congestive heart failure ([facility]) I50.9    Left wrist pain M25.532    Left wrist tendinitis M77.8    GERD (gastroesophageal reflux disease) K21.9    Toxic metabolic encephalopathy A43.0    VIRGINIE (acute kidney injury) ([facility] N17.9    Lumbar radiculopathy M54.16    Acute on chronic respiratory failure ([facility]) J96.20    Rib pain on left side R07.81    Elevated brain natriuretic peptide (BNP) level R79.89    Fracture of multiple ribs of left side S22.42XA    Interstitial lung disease ([facility]) J84.9    Hypomagnesemia E83.42    Depression F32. A    Chronic diastolic congestive heart failure ([facility]) I50.32    Burst fracture of lumbar vertebra ([facility] [facility] S32.001A    H/O Spinal surgery Z98.890    Severe malnutrition ([facility] [facility] E43       Past Medical History:        Diagnosis Date    A-fib ([facility]     Anxiety     Cryptogenic organizing pneumonia ([facility]     Depression     GERD (gastroesophageal reflux disease)     Hyperlipidemia     On home oxygen therapy     uses O2 3L prn    Rash     Thyroid disease     hypothyroidism       Past Surgical History:        Procedure Laterality Date    ARM SURGERY Left 3/2/2020    LEFT ULNAR NERVE DECOMPRESSION AT THE ELBOW performed by Charlotte Garcia MD at [address] N/A 6/27/2019    EBUS WF W/ANES. performed by Daisy Mcgee MD at [facility]  6/27/2019    BRONCHOSCOPY/TRANSBRONCHIAL NEEDLE BIOPSY performed by Daisy Mcgee MD at [facility]  6/27/2019    BRONCHOSCOPY/TRANSBRONCHIAL LUNG BIOPSY performed by Daisy Mcgee MD at [facility]  6/27/2019    BRONCHOSCOPY ALVEOLAR LAVAGE performed by Daisy Mcgee MD at [facility]  07/10/2019    BRONCHOSCOPY N/A 7/10/2019    BRONCHOSCOPY ALVEOLAR LAVAGE performed by Benjamin Alvarado MD at [facility] Bilateral 08/06/2019    BRONCHOSCOPY N/A 8/6/2019    BRONCHOSCOPY ALVEOLAR LAVAGE performed by Lincoln Lal MD at [facility] N/A 12/24/2019    BRONCHOSCOPY ALVEOLAR LAVAGE performed by Malia García MD at [address] [place] [address], TOTAL ABDOMINAL (CERVIX REMOVED)      partial       Social History:    Social History     Tobacco Use    Smoking status: Never    Smokeless tobacco: Never   Substance Use Topics    Alcohol use:  No                                Counseling given: Not Answered      Vital Signs (Current):   Vitals:    08/24/22 2055 08/25/22 0753 08/25/22 0800 08/25/22 0947   BP:   116/84 102/77   Pulse:   100 (!) 102   Resp:   18    Temp:   97.6 °F (36.4 °C)    TempSrc:   Oral    SpO2: 97% 97% 96% 99%   Weight:       Height:                                                  BP Readings from Last 3 Encounters:   08/25/22 102/77   08/08/22 134/80   08/08/22 134/80       NPO Status:                                                                                 BMI:   Wt Readings from Last 3 Encounters:   08/23/22 128 lb 12 oz (58.4 kg)   08/08/22 132 lb (59.9 kg)   08/06/22 132 lb (59.9 kg)     Body mass index is 23.55 kg/m².     CBC:   Lab Results   Component Value Date/Time    WBC 7.5 08/24/2022 06:48 AM    RBC 2.96 08/24/2022 06:48 AM    HGB 8.2 08/24/2022 06:48 AM    HCT 25.6 08/24/2022 06:48 AM    MCV 86.5 08/24/2022 06:48 AM    RDW 17.9 08/24/2022 06:48 AM     08/24/2022 06:48 AM       CMP:   Lab Results   Component Value Date/Time     08/24/2022 06:48 AM    K 3.9 08/24/2022 06:48 AM     08/24/2022 06:48 AM    CO2 33 08/24/2022 06:48 AM    BUN 9 08/24/2022 06:48 AM    CREATININE <0.5 08/24/2022 06:48 AM    GFRAA >60 08/24/2022 06:48 AM    AGRATIO 0.7 07/14/2022 04:00 PM    LABGLOM >60 08/24/2022 06:48 AM    LABGLOM 77 02/17/2020 12:00 AM    GLUCOSE 102 08/24/2022 06:48 AM    PROT 7.9 07/14/2022 04:00 PM    CALCIUM 7.8 08/24/2022 06:48 AM    BILITOT 0.9 07/14/2022 04:00 PM    ALKPHOS 138 07/14/2022 04:00 PM    AST 26 07/14/2022 04:00 PM    ALT 11 07/14/2022 04:00 PM       POC Tests:   Recent Labs     08/23/22  1107   POCGLU 124*       Coags:   Lab Results   Component Value Date/Time    PROTIME 13.3 07/10/2019 10:00 AM    INR 1.17 07/10/2019 10:00 AM    APTT 25.7 06/16/2014 12:40 PM       HCG (If Applicable): No results found for: PREGTESTUR, PREGSERUM, HCG, HCGQUANT     ABGs:   Lab Results   Component Value Date/Time    PHART 7.440 06/26/2019 05:13 PM    PO2ART 55.9 06/26/2019 05:13 PM    PMQ6WDJ 30.8 06/26/2019 05:13 PM    BUQ1FQM 20.4 06/26/2019 05:13 PM    BEART -2.9 06/26/2019 05:13 PM    D2ZJLBRH 90.6 06/26/2019 05:13 PM        Type & Screen (If Applicable):  No results found for: LABABO, LABRH    Drug/Infectious Status (If Applicable):  Lab Results   Component Value Date/Time HEPCAB NEGATIVE 08/03/2017 03:37 PM       COVID-19 Screening (If Applicable):   Lab Results   Component Value Date/Time    COVID19 Not Detected 07/14/2022 04:00 PM    COVID19 NOT DETECTED 04/18/2022 01:23 AM           Anesthesia Evaluation  Patient summary reviewed and Nursing notes reviewed no history of anesthetic complications:   Airway: Mallampati: II  TM distance: >3 FB   Neck ROM: full  Mouth opening: > = 3 FB   Dental: normal exam         Pulmonary:normal exam    (+) pneumonia:  COPD:  shortness of breath:                             Cardiovascular:    (+) hypertension:, CHF:,       ECG reviewed      Echocardiogram reviewed                  Neuro/Psych:   (+) neuromuscular disease:, headaches:, psychiatric history:            GI/Hepatic/Renal:   (+) GERD:,           Endo/Other:    (+) Diabetes, hypothyroidism::., .                 Abdominal:             Vascular: Other Findings:           Anesthesia Plan      MAC     ASA 4       Induction: intravenous. Anesthetic plan and risks discussed with patient. Plan discussed with CRNA.     Attending anesthesiologist reviewed and agrees with Preprocedure content                IRVING العراقي MD   8/25/2022

## 2022-08-25 NOTE — PROGRESS NOTES
Patient resting assessment unchanged ready to go to room. Patient updated per Md. Waiting on transport.

## 2022-08-25 NOTE — PROGRESS NOTES
Speech Language Pathology  [facility]: ACUTE [facility]  SPEECH-LANGUAGE PATHOLOGY      [x] Daily  [] Weekly Care Conference Note  [] Discharge    Ze Courser GAYLE Obando      :1955  FQF:[id number]  Rehab Dx/Hx: H/O Spinal surgery [Z98.890]    Precautions: falls  Home situation: lives with , dtr manages meds, shares finances with ,  does the cooking, cleaning, laundry, yardwork, not an active    ST Dx: [] Aphasia  [] Dysarthria  [] Apraxia   [x] Oropharyngeal dysphagia [x] Cognitive Impairment  [] Other:   Date of Admit: 2022  Room #: 0162/0162-01    Current functional status (updated daily):         Pt being seen for : [x] Speech/Language Treatment  [] Dysphagia Treatment [] Cognitive Treatment  [] Other:  Communication: [x]WFL  [] Aphasia  [] Dysarthria  [] Apraxia  [] Pragmatic Impairment [] Non-verbal  [] Hearing Loss  [] Other:   Cognition: [] WFL  [x] Mild  [] Moderate  [] Severe [] Unable to Assess  [] Other:  Memory: [] WFL  [x] Mild  [] Moderate  [] Severe [] Unable to Assess  [] Other:  Behavior: [x] Alert  [x] Cooperative  [x]  Pleasant  [] Confused  [] Agitated  [] Uncooperative  [] Distractible [] Motivated  [] Self-Limiting [] Anxious  [] Other:  Endurance:  [] Adequate for participation in SLP sessions  [x] Reduced overall  [x] Lethargic  [] Other:  Safety: [x] No concerns at this time  [] Reduced insight into deficits  []  Reduced safety awareness [] Not following call light procedures  [] Unable to Assess  [] Other:    Current Diet Order:Diet NPO  Swallowing Precautions: upright for all intake, stay upright for at least 30 mins after intake, small bites/sips, alternate bites/sips, slow rate of intake, general GERD precautions, and general aspiration precautions        Date: 2022      Tx session 1  2348-2290 Tx session 2  All tx needs met in session 1    Total Timed Code Min 0    Total Treatment Minutes 30    Individual Treatment Minutes 30    Group Treatment Minutes 0    Co-Treat Minutes 0    Variance/Reason:  0    Pain 7 back pain \"sharp shooting pain\"    Pain Intervention [] RN notified  [] Repositioned  [x] Intervention offered and patient declined  [] N/A  [x] Other: pt reported she already had meds    [] RN notified  [] Repositioned  [] Intervention offered and patient declined  [] N/A  [] Other:   Subjective     Pt lethargic and in severe back pain, increased self directed rest breaks. Pt seen sitting on edge of bed. Objective:  Goals           Goal met 08/22/22. Pt tolerating IDDSI level 7 regular solids, thin liquids, meds whole with water 1 pill at a time as LRD. Goal met 08/22/22. Cog Goals:  Short-term Goals  Timeframe for Short-term *goals extended through 08/26/22*    Goal 1: Pt will complete graded recall tasks using compensatory strategies with 90% acc given min cues    Did not target. Goal 2: Pt will complete executive function tasks (e.g. money, time, etc) with 90% acc given min cues      Did not target. Goal 3: Pt will complete higher level critical thinking tasks with 90% acc given min cues   Did not target.      Goal 4: Pt will complete respiratory retraining exercises to improve overall breath support with speech 10/10 given min cues     Respiratory retraining exercises:  -exhale while sustaining the sound /sh/: x10  -exhale while sustaining the sound /f/: x10  -exhale while sustaining the sound /z/: x10  -breath in nose and out mouth through pursed lips: x10  -breath in and out of mouth through pursed lips: x10    -diaphragmatic breathing: x10      Other areas targeted: N/A    Education:   Edu provided re: rest breaks for improved breath support and importance of exercises      Safety Devices: [x] Call light within reach  [x] Chair alarm activated  [x] Bed alarm activated  [] Other: [] Call light within reach  [] Chair alarm activated  [] Bed alarm activated  [] Other:    Assessment: Pt lethargic and stating she had severe back pain (already received meds prior to ST session). Pt declined cognitive tasks this date as she didn't feel she would do well with participation. Pt agreeable to completed respiratory retraining exercises. Pt completed each exercise (see above) x10, with increased self directed rest breaks required. Pt remains motivated to improve but stated \"the pain is too much\" today. Continue goals above. Plan: Continue as per plan of care. Additional Information:     Barriers toward progress: Decreased endurance  Discharge recommendations:  [] Home independently  [] Home with assistance []  24 hour supervision  [] ECF [x] Other: See PT/OT notes   Continued Tx Upon Discharge: ? [] Yes [x] No [] TBD based on progress while on ARU [] Vital Stim indicated [] Other:   Estimated discharge date: 08/31/22    Interventions used this date:  [x] Speech/Language Treatment  [] Instruction in HEP [] Group [] Dysphagia Treatment [] Cognitive Treatment   [] Other: Total Time Breakdown / Charges    Time in Time out Total Time / units   Cognitive Tx -- -- --   Speech Tx 1030 1100 30 min/ 1 unit    Dysphagia Tx          Electronically Signed by     Harshil Pool. A CCC-SLP  Speech-Language Pathologist  [phone]

## 2022-08-25 NOTE — PROGRESS NOTES
Physical Therapy  Facility/Department: [facility]  Rehabilitation Physical Therapy Treatment Note    NAME: Nic Obando  : 1955 (73 y.o.)  MRN: [id number]  CODE STATUS: Full Code    Date of Service: 22       Restrictions:  Restrictions/Precautions: General Precautions; Fall Risk;Up as Tolerated  Position Activity Restriction  Spinal Precautions: No Bending; No Twisting; No Lifting  Spinal Precautions: TLSO when OOB, can jamila EOB; no lifting >10 lbs  Other position/activity restrictions: 4L O2, TLSO, spinal precautions, ok to shower. SUBJECTIVE  Subjective  Subjective: Pt sidelying in bed upon arrival and agreeable to PT session this morning with encouragement. Reports feeling poorly d/t pending colonoscopy this date and bowel prep over night  Pain: Pt reports 8-9/10 pain in low back and R groin            OBJECTIVE  Orientation  Overall Orientation Status: Within Normal Limits  Orientation Level: Oriented X4    Functional Mobility  Sit to Supine  Assistance Level: Supervision  Skilled Clinical Factors: HOB elevated, use of BR, increased time to complete with log roll technique. Completes x 2 reps  Supine to Sit  Assistance Level: Supervision  Skilled Clinical Factors: HOB elevated, use of BR, increased time to complete with log roll technique. Completes x 2 reps  Scooting  Assistance Level: Stand by assist  Skilled Clinical Factors: to scoot hips to EOB  Standing Balance  Activity: Standing at [facility] pre and post toileting with BUE support on RW; PT dependently completes pulling up/down briefs d/t pt's need for UE support for balance  Transfers  Surface: From bed;Bedside commode  Additional Factors: Verbal cues; Hand placement cues; Increased time to complete  Device: Walker (RW)  Sit to Stand  Assistance Level: Contact guard assist  Skilled Clinical Factors: with increased time d/t pain  Stand to Sit  Assistance Level: Contact guard assist  Skilled Clinical Factors: with increased time d/t pain  Stand Pivot  Assistance Level: Contact guard assist  Skilled Clinical Factors: using RW, EOB<>BSC; pt completes x 3 reps during session      Environmental Mobility  Pt declines ambulation trials this morning d/t increased pain and fatigue                PT Exercises  Exercise Treatment: Seated BLE exercises: ankle pumps 2 x 20, LAQ 2 x 15, seated marching 2 x 15      ASSESSMENT/PROGRESS TOWARDS GOALS  Vital Signs  Heart Rate: (!) 102  Heart Rate Source: Monitor  BP: 102/77  BP Location: Right upper arm  MAP (Calculated): 85.33  SpO2: 99 %  O2 Device: Nasal cannula (4L)    Assessment  Assessment: Pt limited by pain and fatigue this morning. Per pt and RN, pt scheduled to have colonoscopy this afternoon however RN states pt ok to participate in PT this morning. Pt reports poor sleep and increased pain this morning therefore requires encouragement for participation. Able to complete bed mobility with supervision and functional transfers with CGA using RW. Pt unwilling to ambulate d/t pain therefore focus of session on bed mobility, functional transfers, and LE exercises for strengthening. Requires increased time and multiple rest breaks during session d/t fatigue and pain. Will continue to progress functional mobility as appropriate. Activity Tolerance: Patient limited by pain; Patient limited by fatigue  Discharge Recommendations: 24 hour supervision or assist;Home with Home health PT  PT Equipment Recommendations  Equipment Needed: Yes  Mobility Devices: Jacquie [place]: Rolling  Other: Pt also has w/c at home    Goals  Patient Goals   Patient goals : Madisonsridhar Sorto be able to walk straight\"  Short Term Goals  Time Frame for Short term goals: 5 days- 8/20/22  Short term goal 1: Pt will complete bed mobility with SBA, with min cues for log roll technique -- 8/20: GOAL PARTIALLY MET SBA with bed rails and cues for log roll  Short term goal 2: Pt will complete functional transfers with CGA consistently, using LRAD -- 8/23: GOAL PARTIALLY MET pt can transfer at [facility], but after an hour of PT requires MIN A due to fatigue. Short term goal 3: Pt will ambulate 25ft with min A using RW -- 8/23 GOAL MET  Long Term Goals  Time Frame for Long term goals : 10 days- 8/26/22-- goals extended to 8/31 based on updated d/c date  Long term goal 1: Pt will complete bed mobility with mod I without cues for log roll technique  Long term goal 2: Pt will complete functional transfers with mod I using LRAD  Long term goal 3: Pt will ambulate 50ft with supervision using LRAD  Long term goal 4: Pt will complete car transfer with supervision using LRAD  Long term goal 5: Pt will verbalize 3/3 spinal precautions without cues and adhere to precautions with mobility without cues    PLAN OF CARE/SAFETY  Plan  Plan: 5-7 times per week  Plan weeks: 10 days  Current Treatment Recommendations: Strengthening;Balance training;Functional mobility training;Transfer training; Endurance training; Wheelchair mobility training;Gait training;Stair training;Equipment evaluation, education, & procurement; Neuromuscular re-education;Home exercise program;Safety education & training;Patient/Caregiver education & training;Positioning  Safety Devices  Type of Devices: All fall risk precautions in place;Call light within reach; Bed alarm in place; Patient at risk for falls; Left in bed;Nurse notified    EDUCATION  Education  Education Given To: Patient  Education Provided: Role of Therapy;Cognition;Plan of Care;Family Education;Mobility Training;Equipment;Precautions;Transfer Training; Safety; Energy Conservation; Fall Prevention Strategies;DME/Home Modifications  Education Provided Comments: Educated on importance of OOB mobility, spinal px and use of TLSO, functional mobility and positional changes with back pain, LE exercises  Education Method: Demonstration;Verbal  Barriers to Learning: None  Education Outcome: Verbalized understanding;Demonstrated understanding;Continued education needed        Therapy Time   Individual Concurrent Group Co-treatment   Time In 0900         Time Out 1000         Minutes 60           Timed Code Treatment Minutes: 1000 [facility], PT, DPT 08/25/22 at 12:09 PM

## 2022-08-25 NOTE — PROGRESS NOTES
Slime Obando  8/25/2022  [id number]    Chief Complaint: H/O Spinal surgery    Subjective:   No acute events overnight. Today Abby Cota is seen in her room with her daughter on speaker phone. She reports that hip pain is improved. She now endorses shoulder blade pain. She denies increased shortness of breath. She is having a colonoscopy today. ROS: denies f/c, n/v, cp, sob    Objective:  Patient Vitals for the past 24 hrs:   BP Temp Temp src Pulse Resp SpO2   08/25/22 1500 105/87 -- -- 81 16 95 %   08/25/22 1346 -- -- -- 79 -- --   08/25/22 0947 102/77 -- -- (!) 102 -- 99 %   08/25/22 0800 116/84 97.6 °F (36.4 °C) Oral 100 18 96 %   08/25/22 0753 -- -- -- -- -- 97 %   08/24/22 2055 -- -- -- -- -- 97 %   08/24/22 1930 115/69 98.6 °F (37 °C) Oral 98 18 100 %   08/24/22 1515 110/60 -- -- 80 -- 95 %     Gen: No distress, pleasant. Lying on side in bed  HEENT: Normocephalic, atraumatic. CV: RRR  Resp: No respiratory distress. On 3 L NC  Abd: Soft, nondistended  Ext: No edema. Neuro: Alert, oriented, appropriately interactive.  Speech fluent without aphasia  Psych: appropriate mood and affect    Wt Readings from Last 3 Encounters:   08/23/22 128 lb 12 oz (58.4 kg)   08/08/22 132 lb (59.9 kg)   08/06/22 132 lb (59.9 kg)       Laboratory data:   Lab Results   Component Value Date    WBC 7.5 08/24/2022    HGB 8.2 (L) 08/24/2022    HCT 25.6 (L) 08/24/2022    MCV 86.5 08/24/2022     08/24/2022       Lab Results   Component Value Date/Time     08/24/2022 06:48 AM    K 3.9 08/24/2022 06:48 AM     08/24/2022 06:48 AM    CO2 33 08/24/2022 06:48 AM    BUN 9 08/24/2022 06:48 AM    CREATININE <0.5 08/24/2022 06:48 AM    GLUCOSE 102 08/24/2022 06:48 AM    CALCIUM 7.8 08/24/2022 06:48 AM        Therapy progress:  PT  Position Activity Restriction  Spinal Precautions: No Bending, No Twisting, No Lifting  Spinal Precautions: TLSO when OOB, can jamila EOB; no lifting >10 lbs  Other position/activity restrictions: 4L O2, TLSO, spinal precautions, ok to shower. Objective     Sit to Stand: Minimal Assistance (up to RW with increased time)  Stand to sit: Minimal Assistance  Bed to Chair: Minimal assistance, Moderate assistance (using RW via stand pivot transfer)     OT  PT Equipment Recommendations  Equipment Needed: Yes  Mobility Devices: Cookie Fennel: Rolling  Other: Pt also has w/c at home  Toilet - Technique: Stand step, To left, To right (grab bar vs RW)  Equipment Used: Standard toilet  Toilet Transfers Comments: assist with lifting and lowering, would benefit from [facility] over toilet with RW vs grab bar for support  Assessment        SLP                Body mass index is 23.55 kg/m². Assessment and Plan: Hollie Conner is a 69year old female with a past medical history significant for chronic respiratory failure on 3 L home O2, ILD, COPD, paroxysmal atrial fibrillation, DM2, HTN, HLD, and thyroid disease who presented to  on 8/11/22 for planned T11-L3 PDFF with L1 corpectomy for L1 burst fracture. She was admitted to [facility] on 8/16/22 due to functional deficits below her baseline. L1 Burst Fracture  - s/p T11-L3 PDFF with L1 corpectomy  - TLSO when OOB  - pain control  - staples removed 8/24  - incision open to air  - PT, OT     Chronic Respiratory Failure  ILD/COPD  - on 3 L home O2 at baseline  - now with increased oxygen requirement  - XR chest negative for acute process  - pifenidone TID, has not been regularly taking  - scheduled duo-nebs  - Pulm consulted, appreciate assistance. No acute process   - 3 L at rest and ok to be up to 6 L with activity  - humidified air     Large Bleeding hemorrhoids  - colonoscopy with GI today  - hydrocortisone suppositories per GI  - outpatient follow up    Colon polyp  - removed on colonoscopy 8/25  - pending pathology    Hypokalemia, resolved  - resolved without taking supplement.  Was unable to tolerate due to upsetting her stomach  - continue to monitor intermittently      pAF  - not been on [facility]  - monitor     DM2  - on januvia and jardiance at home, family brought in home meds  - glucose has been low due to poor PO intake, hold off on resuming home meds     Hypothyroidism  - synthroid     GERD  - PPI  - PRN omeprazole per home regimen     Anxiety/Depression  - zoloft, mirtazipine     HLD  - statin      Bowels: Schedule stool softener. Follow bowel movements. Enema or suppository if needed. Bladder: Check PVR x 3. [address] if PVR > 200ml or if any volume is > 500 ml. Sleep: Trazodone provided prn. Follow up appointments: Ortho Spine, PCP, Pulm    Services: home health PT, OT, nursing  EDOD: 8/31/22    Odette Alfred.  Hugh Madison MD 8/25/2022, 3:08 PM

## 2022-08-26 LAB
ANION GAP SERPL CALCULATED.3IONS-SCNC: 7 MMOL/L (ref 3–16)
BASOPHILS ABSOLUTE: 0 K/UL (ref 0–0.2)
BASOPHILS RELATIVE PERCENT: 0.5 %
BUN BLDV-MCNC: 5 MG/DL (ref 7–20)
CALCIUM SERPL-MCNC: 8.1 MG/DL (ref 8.3–10.6)
CHLORIDE BLD-SCNC: 100 MMOL/L (ref 99–110)
CO2: 32 MMOL/L (ref 21–32)
CREAT SERPL-MCNC: <0.5 MG/DL (ref 0.6–1.2)
EOSINOPHILS ABSOLUTE: 0.2 K/UL (ref 0–0.6)
EOSINOPHILS RELATIVE PERCENT: 3.2 %
GFR AFRICAN AMERICAN: >60
GFR NON-AFRICAN AMERICAN: >60
GLUCOSE BLD-MCNC: 108 MG/DL (ref 70–99)
GLUCOSE BLD-MCNC: 111 MG/DL (ref 70–99)
HCT VFR BLD CALC: 23.6 % (ref 36–48)
HEMOGLOBIN: 7.6 G/DL (ref 12–16)
LYMPHOCYTES ABSOLUTE: 1.4 K/UL (ref 1–5.1)
LYMPHOCYTES RELATIVE PERCENT: 20.8 %
MAGNESIUM: 1.7 MG/DL (ref 1.8–2.4)
MAGNESIUM: 1.7 MG/DL (ref 1.8–2.4)
MCH RBC QN AUTO: 27.5 PG (ref 26–34)
MCHC RBC AUTO-ENTMCNC: 32.1 G/DL (ref 31–36)
MCV RBC AUTO: 85.6 FL (ref 80–100)
MONOCYTES ABSOLUTE: 0.6 K/UL (ref 0–1.3)
MONOCYTES RELATIVE PERCENT: 8.6 %
NEUTROPHILS ABSOLUTE: 4.6 K/UL (ref 1.7–7.7)
NEUTROPHILS RELATIVE PERCENT: 66.9 %
PDW BLD-RTO: 18.1 % (ref 12.4–15.4)
PERFORMED ON: ABNORMAL
PLATELET # BLD: 186 K/UL (ref 135–450)
PMV BLD AUTO: 6.6 FL (ref 5–10.5)
POTASSIUM REFLEX MAGNESIUM: 3.3 MMOL/L (ref 3.5–5.1)
RBC # BLD: 2.76 M/UL (ref 4–5.2)
SODIUM BLD-SCNC: 139 MMOL/L (ref 136–145)
WBC # BLD: 6.9 K/UL (ref 4–11)

## 2022-08-26 PROCEDURE — 6370000000 HC RX 637 (ALT 250 FOR IP): Performed by: STUDENT IN AN ORGANIZED HEALTH CARE EDUCATION/TRAINING PROGRAM

## 2022-08-26 PROCEDURE — 36415 COLL VENOUS BLD VENIPUNCTURE: CPT

## 2022-08-26 PROCEDURE — 97116 GAIT TRAINING THERAPY: CPT

## 2022-08-26 PROCEDURE — 85025 COMPLETE CBC W/AUTO DIFF WBC: CPT

## 2022-08-26 PROCEDURE — 97530 THERAPEUTIC ACTIVITIES: CPT

## 2022-08-26 PROCEDURE — 6370000000 HC RX 637 (ALT 250 FOR IP): Performed by: INTERNAL MEDICINE

## 2022-08-26 PROCEDURE — 1280000000 HC REHAB R&B

## 2022-08-26 PROCEDURE — 97535 SELF CARE MNGMENT TRAINING: CPT

## 2022-08-26 PROCEDURE — 97129 THER IVNTJ 1ST 15 MIN: CPT

## 2022-08-26 PROCEDURE — 2700000000 HC OXYGEN THERAPY PER DAY

## 2022-08-26 PROCEDURE — 94640 AIRWAY INHALATION TREATMENT: CPT

## 2022-08-26 PROCEDURE — 97130 THER IVNTJ EA ADDL 15 MIN: CPT

## 2022-08-26 PROCEDURE — 6360000002 HC RX W HCPCS: Performed by: STUDENT IN AN ORGANIZED HEALTH CARE EDUCATION/TRAINING PROGRAM

## 2022-08-26 PROCEDURE — 80048 BASIC METABOLIC PNL TOTAL CA: CPT

## 2022-08-26 PROCEDURE — 94761 N-INVAS EAR/PLS OXIMETRY MLT: CPT

## 2022-08-26 PROCEDURE — 97110 THERAPEUTIC EXERCISES: CPT

## 2022-08-26 PROCEDURE — 83735 ASSAY OF MAGNESIUM: CPT

## 2022-08-26 RX ORDER — POTASSIUM CHLORIDE 750 MG/1
10 TABLET, EXTENDED RELEASE ORAL 2 TIMES DAILY
Status: DISCONTINUED | OUTPATIENT
Start: 2022-08-26 | End: 2022-09-02 | Stop reason: HOSPADM

## 2022-08-26 RX ORDER — PIRFENIDONE 267 MG/1
3 TABLET, FILM COATED ORAL 3 TIMES DAILY
Qty: 270 TABLET | Refills: 3 | Status: ON HOLD | OUTPATIENT
Start: 2022-08-26 | End: 2022-10-22 | Stop reason: SDUPTHER

## 2022-08-26 RX ADMIN — OXYCODONE AND ACETAMINOPHEN 2 TABLET: 5; 325 TABLET ORAL at 23:01

## 2022-08-26 RX ADMIN — METHOCARBAMOL TABLETS 750 MG: 750 TABLET, COATED ORAL at 08:40

## 2022-08-26 RX ADMIN — ATORVASTATIN CALCIUM 40 MG: 40 TABLET, FILM COATED ORAL at 08:40

## 2022-08-26 RX ADMIN — NICOTINE POLACRILEX 20 MG: 4 GUM, CHEWING ORAL at 08:01

## 2022-08-26 RX ADMIN — LEVOTHYROXINE SODIUM 125 MCG: 0.12 TABLET ORAL at 05:39

## 2022-08-26 RX ADMIN — METHOCARBAMOL TABLETS 750 MG: 750 TABLET, COATED ORAL at 20:50

## 2022-08-26 RX ADMIN — PANTOPRAZOLE SODIUM 40 MG: 40 TABLET, DELAYED RELEASE ORAL at 05:39

## 2022-08-26 RX ADMIN — HYDROCORTISONE ACETATE 25 MG: 25 SUPPOSITORY RECTAL at 08:40

## 2022-08-26 RX ADMIN — PIRFENIDONE 801 MG: 801 TABLET, FILM COATED ORAL at 08:42

## 2022-08-26 RX ADMIN — IPRATROPIUM BROMIDE AND ALBUTEROL SULFATE 1 AMPULE: 2.5; .5 SOLUTION RESPIRATORY (INHALATION) at 20:06

## 2022-08-26 RX ADMIN — HEPARIN SODIUM 5000 UNITS: 5000 INJECTION INTRAVENOUS; SUBCUTANEOUS at 14:58

## 2022-08-26 RX ADMIN — HEPARIN SODIUM 5000 UNITS: 5000 INJECTION INTRAVENOUS; SUBCUTANEOUS at 20:50

## 2022-08-26 RX ADMIN — IPRATROPIUM BROMIDE AND ALBUTEROL SULFATE 1 AMPULE: 2.5; .5 SOLUTION RESPIRATORY (INHALATION) at 07:36

## 2022-08-26 RX ADMIN — POTASSIUM CHLORIDE 10 MEQ: 750 TABLET, EXTENDED RELEASE ORAL at 17:15

## 2022-08-26 RX ADMIN — ACETAMINOPHEN 500 MG: 500 TABLET ORAL at 10:54

## 2022-08-26 RX ADMIN — MIRTAZAPINE 30 MG: 15 TABLET, FILM COATED ORAL at 20:50

## 2022-08-26 RX ADMIN — ACETAMINOPHEN 1000 MG: 500 TABLET ORAL at 00:13

## 2022-08-26 RX ADMIN — NADOLOL 20 MG: 20 TABLET ORAL at 08:42

## 2022-08-26 RX ADMIN — POTASSIUM CHLORIDE 10 MEQ: 750 TABLET, EXTENDED RELEASE ORAL at 20:50

## 2022-08-26 RX ADMIN — Medication 400 MG: at 08:40

## 2022-08-26 RX ADMIN — METHOCARBAMOL TABLETS 750 MG: 750 TABLET, COATED ORAL at 14:59

## 2022-08-26 RX ADMIN — HEPARIN SODIUM 5000 UNITS: 5000 INJECTION INTRAVENOUS; SUBCUTANEOUS at 05:39

## 2022-08-26 RX ADMIN — CETIRIZINE HYDROCHLORIDE 10 MG: 10 TABLET, FILM COATED ORAL at 08:40

## 2022-08-26 RX ADMIN — HYDROCORTISONE ACETATE 25 MG: 25 SUPPOSITORY RECTAL at 20:50

## 2022-08-26 RX ADMIN — SERTRALINE HYDROCHLORIDE 200 MG: 50 TABLET ORAL at 08:40

## 2022-08-26 RX ADMIN — OXYCODONE AND ACETAMINOPHEN 1 TABLET: 5; 325 TABLET ORAL at 16:45

## 2022-08-26 ASSESSMENT — PAIN DESCRIPTION - ONSET
ONSET: ON-GOING
ONSET: ON-GOING

## 2022-08-26 ASSESSMENT — PAIN SCALES - GENERAL
PAINLEVEL_OUTOF10: 3
PAINLEVEL_OUTOF10: 7
PAINLEVEL_OUTOF10: 0
PAINLEVEL_OUTOF10: 6
PAINLEVEL_OUTOF10: 0
PAINLEVEL_OUTOF10: 3
PAINLEVEL_OUTOF10: 7
PAINLEVEL_OUTOF10: 6
PAINLEVEL_OUTOF10: 0
PAINLEVEL_OUTOF10: 6

## 2022-08-26 ASSESSMENT — PAIN DESCRIPTION - DESCRIPTORS
DESCRIPTORS: ACHING

## 2022-08-26 ASSESSMENT — PAIN DESCRIPTION - LOCATION
LOCATION: BACK;GROIN
LOCATION: BACK;GROIN
LOCATION: HEAD
LOCATION: BACK;GROIN
LOCATION: HEAD

## 2022-08-26 ASSESSMENT — PAIN DESCRIPTION - FREQUENCY
FREQUENCY: INTERMITTENT
FREQUENCY: INTERMITTENT

## 2022-08-26 NOTE — TELEPHONE ENCOUNTER
LM on VM letting patient know okay to decrease medication, informed patient to CB with any questions

## 2022-08-26 NOTE — PROGRESS NOTES
position/activity restrictions: 5-6L O2 (initially 5L at rest, increasing to 6L with activity), TLSO, spinal precautions, ok to shower. Objective     Sit to Stand: Minimal Assistance (up to RW with increased time)  Stand to sit: Minimal Assistance  Bed to Chair: Minimal assistance, Moderate assistance (using RW via stand pivot transfer)     OT  PT Equipment Recommendations  Equipment Needed: Yes  Mobility Devices: Vishnu Simpers: Rolling  Other: Pt also has w/c at home  Toilet - Technique: Stand step, To left, To right (grab bar vs RW)  Equipment Used: Standard toilet  Toilet Transfers Comments: assist with lifting and lowering, would benefit from [facility] over toilet with RW vs grab bar for support  Assessment        SLP                Body mass index is 23.55 kg/m². Assessment and Plan: Segundo Ortiz is a 69year old female with a past medical history significant for chronic respiratory failure on 3 L home O2, ILD, COPD, paroxysmal atrial fibrillation, DM2, HTN, HLD, and thyroid disease who presented to  on 8/11/22 for planned T11-L3 PDFF with L1 corpectomy for L1 burst fracture. She was admitted to [facility] on 8/16/22 due to functional deficits below her baseline. L1 Burst Fracture  - s/p T11-L3 PDFF with L1 corpectomy  - TLSO when OOB  - pain control  - staples removed 8/24  - incision open to air  - PT, OT     Chronic Respiratory Failure  ILD/COPD  - on 3 L home O2 at baseline  - now with increased oxygen requirement  - XR chest negative for acute process  - pifenidone TID, has not been regularly taking  - scheduled duo-nebs  - Pulm consulted, appreciate assistance.  No acute process   - 3 L at rest and ok to be up to 6 L with activity  - humidified air     Large Bleeding hemorrhoids  - colonoscopy with GI today  - hydrocortisone suppositories per GI  - outpatient follow up    Colon polyp  - removed on colonoscopy 8/25  - follow up with GI OP    Hypokalemia  - resume daily supplement  - continue to monitor intermittently      pAF  - not been on [facility]  - monitor     DM2  - on januvia and jardiance at home, family brought in home meds  - glucose has been low due to poor PO intake, hold off on resuming home meds     Hypothyroidism  - synthroid     GERD  - PPI  - PRN omeprazole per home regimen     Anxiety/Depression  - zoloft, mirtazipine     HLD  - statin      Bowels: Schedule stool softener. Follow bowel movements. Enema or suppository if needed. Bladder: Check PVR x 3. [address] if PVR > 200ml or if any volume is > 500 ml. Sleep: Trazodone provided prn. Follow up appointments: Ortho Spine, PCP, Pulm    Services: home health PT, OT, nursing  EDOD: 8/31/22    Rylee Delgado MD 8/26/2022, 4:26 PM

## 2022-08-26 NOTE — PLAN OF CARE
Problem: Pain  Goal: Verbalizes/displays adequate comfort level or baseline comfort level  Outcome: Progressing     Problem: Skin/Tissue Integrity  Goal: Absence of new skin breakdown  Description: 1.   Monitor for areas of redness and/or skin breakdown    Problem: Nutrition Deficit:  Goal: Optimize nutritional status  Outcome: Progressing   Outcome: Progressing

## 2022-08-26 NOTE — PROGRESS NOTES
Progress Note    Patient Chapis Obando  MRN: [id number]  YOB: 1955 Age: 69 y.o. Sex: female  Room: [address] [place] [address]       Admitting Physician: Brando Garcia MD   Date of Admission: 8/16/2022  5:10 PM   Primary Care Physician: Danny Lopez MD     Subjective:  Chapis Obando was seen and examined. We are following for GIB. -- had some blood with BM today. Overall feeling better. ROS:  Constitutional: Denies fever, no change in appetite  Respiratory: Denies cough or shortness of breath  Cardiovascular: Denies chest pain or edema    Objective:  Vital Signs:   Vitals:    08/26/22 1539   BP: 110/73   Pulse: 75   Resp:    Temp:    SpO2: 100%         Physical Exam:  Constitutional: Alert and oriented x 4. No acute distress. Respiratory: Respirations nonlabored, no crepitus  GI: Abdomen nondistended, soft, and nontender. Neurological: No focal deficits noted. No asterixis.     Intake/Output:    Intake/Output Summary (Last 24 hours) at 8/26/2022 1623  Last data filed at 8/26/2022 1228  Gross per 24 hour   Intake 390 ml   Output --   Net 390 ml        Current Medications:  Current Facility-Administered Medications   Medication Dose Route Frequency Provider Last Rate Last Admin    potassium chloride (KLOR-CON M) extended release tablet 10 mEq  10 mEq Oral BID Brando Garcia MD        lactated ringers infusion   IntraVENous Continuous Diana Fleischer, MD   Stopped at 08/25/22 1535    hydrocortisone (ANUSOL-HC) suppository 25 mg  25 mg Rectal BID Dorys Sutherland MD   25 mg at 08/26/22 0840    senna (SENOKOT) tablet 8.6 mg  1 tablet Oral Daily PRN Brando Garcia MD        magnesium oxide (MAG-OX) tablet 400 mg  400 mg Oral Daily Brando Garcia MD   400 mg at 08/26/22 0840    lidocaine 4 % external patch 1 patch  1 patch TransDERmal Daily Brando Garcia MD   1 patch at 08/26/22 0854    omeprazole EC tablet 20 mg  (Patient Supplied)  20 mg Oral Daily PRN Chantal Jones MD   20 mg at 08/26/22 0801    lidocaine 4 % external patch 1 patch  1 patch TransDERmal Daily Chantal Jones MD   1 patch at 08/26/22 0855    oxyCODONE-acetaminophen (PERCOCET) 5-325 MG per tablet 1 tablet  1 tablet Oral Q4H PRN Chantal Jones MD   1 tablet at 08/24/22 1200    oxyCODONE-acetaminophen (PERCOCET) 5-325 MG per tablet 2 tablet  2 tablet Oral Q4H PRN Chantal Jones MD   2 tablet at 08/25/22 1916    ondansetron (ZOFRAN-ODT) disintegrating tablet 4 mg  4 mg Oral Q4H PRN Chantal Jones MD   4 mg at 08/24/22 1441    ipratropium-albuterol (DUONEB) nebulizer solution 1 ampule  1 ampule Inhalation TID Chantal Jones MD   1 ampule at 08/26/22 0736    ipratropium-albuterol (DUONEB) nebulizer solution 1 ampule  1 ampule Inhalation Q4H PRN Chantal Jones MD        heparin (porcine) injection 5,000 Units  5,000 Units SubCUTAneous 3 times per day Chantal Jones MD   5,000 Units at 08/26/22 1458    methocarbamol (ROBAXIN) tablet 750 mg  750 mg Oral TID Chantal Jones MD   750 mg at 08/26/22 1459    acetaminophen (TYLENOL) tablet 1,000 mg  1,000 mg Oral Q8H PRN Chantal Jones MD   500 mg at 08/26/22 1054    albuterol sulfate HFA (PROVENTIL;VENTOLIN;PROAIR) 108 (90 Base) MCG/ACT inhaler 2 puff  2 puff Inhalation Q6H PRN Chantal Jones MD   2 puff at 08/22/22 1645    atorvastatin (LIPITOR) tablet 40 mg  40 mg Oral Daily Chantal Jones MD   40 mg at 08/26/22 0840    benzonatate (TESSALON) capsule 100 mg  100 mg Oral TID PRN Chantal Jones MD        vitamin D (ERGOCALCIFEROL) capsule 50,000 Units  50,000 Units Oral Weekly Chantal Jones MD        furosemide (LASIX) tablet 40 mg  40 mg Oral Daily PRN Chantal Jones MD        hydrOXYzine HCl (ATARAX) tablet 10 mg  10 mg Oral TID PRN Chantal Jones MD   10 mg at 08/19/22 2054    levothyroxine (SYNTHROID) tablet 125 mcg  125 mcg Oral Daily Chantal Jones MD 125 mcg at 08/26/22 0539    cetirizine (ZYRTEC) tablet 10 mg  10 mg Oral Daily Americo Schmitz MD   10 mg at 08/26/22 0840    mirtazapine (REMERON) tablet 30 mg  30 mg Oral Nightly Americo Schmitz MD   30 mg at 08/25/22 2111    nadolol (CORGARD) tablet 20 mg  20 mg Oral Daily Americo Schmitz MD   20 mg at 08/26/22 1280    naloxone [facility]) injection 0.4 mg  0.4 mg IntraVENous PRN Americo Schmitz MD        pantoprazole (PROTONIX) tablet 40 mg  40 mg Oral QAM AC Americo Schmitz MD   40 mg at 08/26/22 0539    sertraline (ZOLOFT) tablet 200 mg  200 mg Oral Daily Americo Schmitz MD   200 mg at 08/26/22 0840    bisacodyl (DULCOLAX) suppository 10 mg  10 mg Rectal Daily PRN Americo Schmitz MD        Pirfenidone TABS 801 mg (Patient Supplied)  801 mg Oral TID WC Americo Schmitz MD   801 mg at 08/26/22 4192         Recent Imaging:   XR CHEST PORTABLE  Narrative: EXAMINATION:  ONE XRAY VIEW OF THE CHEST    8/24/2022 12:00 pm    COMPARISON:  07/14/2022 radiograph    HISTORY:  ORDERING SYSTEM PROVIDED HISTORY: hypoxia, history of ILD  TECHNOLOGIST PROVIDED HISTORY:  Reason for exam:->hypoxia, history of ILD    FINDINGS:  The heart is enlarged. Mediastinum is normal.  A diffuse pattern of  reticular airspace change is seen throughout both lungs. Overall low lung  volume. No focal consolidation. No significant skeletal finding. Impression: Chronic pattern of interstitial lung disease better characterized on prior  cross-sectional imaging. No acute finding or detrimental change. Labs:   Recent Labs     08/24/22  0648 08/26/22  0638   HGB 8.2* 7.6*   WBC 7.5 6.9          Assessment:    69year old F with PMH significant for A Fib, Anxiety, depression, GERD, hyperlipidemia, and hypothyroidism. Admitted to acute rehab following spinal surgery.  Now with rectal bleeding  Colonoscopy 8/25/22: colon polyp removed, large bleeding hemorrhoids-- Path: tubular adenoma    HGB down trending from 8.2 yesterday to 7.6 today    Plan:  Trend HGB  Hydrocortisone suppository  Follow up as outpatient for hemorrhoidal banding  Recommend fiber supplement  Avoid straining for BM      MD Robb Hopson Neighbor    [phone].  Also available via [employer]

## 2022-08-26 NOTE — PROGRESS NOTES
Speech Language Pathology  [facility]: ACUTE REHAB UNIT  SPEECH-LANGUAGE PATHOLOGY      [x] Daily  [] Weekly Care Conference Note  [] Discharge    Madelyn Obando      :1955  IFO:[id number]  Rehab Dx/Hx: H/O Spinal surgery [Z98.890]    Precautions: falls  Home situation: lives with , dtr manages meds, shares finances with ,  does the cooking, cleaning, laundry, yardwork, not an active    ST Dx: [] Aphasia  [] Dysarthria  [] Apraxia   [x] Oropharyngeal dysphagia [x] Cognitive Impairment  [] Other:   Date of Admit: 2022  Room #: 0162/0162-01    Current functional status (updated daily):         Pt being seen for : [x] Speech/Language Treatment  [] Dysphagia Treatment [] Cognitive Treatment  [] Other:  Communication: [x]WFL  [] Aphasia  [] Dysarthria  [] Apraxia  [] Pragmatic Impairment [] Non-verbal  [] Hearing Loss  [] Other:   Cognition: [] WFL  [x] Mild  [] Moderate  [] Severe [] Unable to Assess  [] Other:  Memory: [] WFL  [x] Mild  [] Moderate  [] Severe [] Unable to Assess  [] Other:  Behavior: [x] Alert  [x] Cooperative  [x]  Pleasant  [] Confused  [] Agitated  [] Uncooperative  [] Distractible [] Motivated  [] Self-Limiting [] Anxious  [] Other:  Endurance:  [] Adequate for participation in SLP sessions  [x] Reduced overall  [x] Lethargic  [] Other:  Safety: [x] No concerns at this time  [] Reduced insight into deficits  []  Reduced safety awareness [] Not following call light procedures  [] Unable to Assess  [] Other:    Current Diet Order:ADULT DIET;  Regular  Swallowing Precautions: upright for all intake, stay upright for at least 30 mins after intake, small bites/sips, alternate bites/sips, slow rate of intake, general GERD precautions, and general aspiration precautions        Date: 2022      Tx session 1  [id number] Tx session 2  All tx needs met in session 1    Total Timed Code Min 0    Total Treatment Minutes 30    Individual Treatment Minutes 30    Group Treatment Minutes 0    Co-Treat Minutes 0    Variance/Reason:  0    Pain 7 back pain \"sharp shooting pain\"    Pain Intervention [] RN notified  [] Repositioned  [x] Intervention offered and patient declined  [] N/A  [x] Other: pt reported she already had meds    [] RN notified  [] Repositioned  [] Intervention offered and patient declined  [] N/A  [] Other:   Subjective     Pt lethargic and in severe back pain, increased self directed rest breaks. Pt seen sitting on edge of bed. Objective:  Goals           Goal met 08/22/22. Pt tolerating IDDSI level 7 regular solids, thin liquids, meds whole with water 1 pill at a time as LRD. Goal met 08/22/22. Cog Goals:  Short-term Goals  Timeframe for Short-term *goals extended through 08/26/22*    Goal 1: Pt will complete graded recall tasks using compensatory strategies with 90% acc given min cues    SLUMS:  -immediate recall: 100% acc  -short term recall: 100% acc        Goal 2: Pt will complete executive function tasks (e.g. money, time, etc) with 90% acc given min cues      SLUMS:  -money calculations: 50% acc indep improving to 100% acc given min cues    Goal 3: Pt will complete higher level critical thinking tasks with 90% acc given min cues   5 step written sequencing task:  -90% acc indep improving to 100% acc given min cues     Goal 4: Pt will complete respiratory retraining exercises to improve overall breath support with speech 10/10 given min cues     Did not target. Other areas targeted: Repeat SLUMS:  -pt scored a 28/30 today compared to initial evaluation score of 26/30       Education:   Edu provided re: progress towards goals, results of SLUMS      Safety Devices: [x] Call light within reach  [] Chair alarm activated  [x] Bed alarm activated  [] Other: [] Call light within reach  [] Chair alarm activated  [] Bed alarm activated  [] Other:    Assessment: Pt pleasant and cooperative, agreeable to participate.  Pt completed repeat SLUMS scoring a 28/30 today compared to initial evaluation score of 26/30. Pt completed five step written sequencing task with 90% acc indep improving to 100% acc given min cues. Pt does demonstrate min difficulty with higher level executive functioning tasks (but has assist from  and dtr at home with meds/finances). Pt is making consistent progress towards goals and remains motivated to improve. Continue goals above. Plan: Continue as per plan of care. Additional Information:     Barriers toward progress: Decreased endurance  Discharge recommendations:  [] Home independently  [] Home with assistance []  24 hour supervision  [] ECF [x] Other: See PT/OT notes   Continued Tx Upon Discharge: ? [] Yes [x] No [] TBD based on progress while on ARU [] Vital Stim indicated [] Other:   Estimated discharge date: 08/31/22    Interventions used this date:  [] Speech/Language Treatment  [] Instruction in HEP [] Group [] Dysphagia Treatment [x] Cognitive Treatment   [] Other: Total Time Breakdown / Charges    Time in Time out Total Time / units   Cognitive Tx 0900 0930 30 min/ 2 units    Speech Tx -- -- --   Dysphagia Tx -- -- --       Electronically Signed by     Prisca Tai. A CCC-SLP  Speech-Language Pathologist  [id number]

## 2022-08-26 NOTE — TELEPHONE ENCOUNTER
Pt returned call and was informed, but pt only has the 801 mg tabs. Pt would like to have order sent to [address] [place] [address]. Script pending. Pt asking for script to be sent in ASAP. Sending to Dr. Ross Delgado as well, as Dr. Divya Liriano has already left for today.

## 2022-08-26 NOTE — TELEPHONE ENCOUNTER
Patient called states she has been on higher dose of Esbriet 801mg its making her sick to her stomach. Requesting to decrease dose? Inpt for back surgery went well.     8/8/22    Assessment:       -Based on the biopsies, seems h/o of cryptogenic organizing pneumonia and possibly coexisting NSIP or chronic HP.    -Acute exacerbation of ILD   -History of COPD  -Chronic respiratory failure-      Plan:       *- she is off Prednisone   *- On pirfenidone   *-CT last admission show actually some improvement   *_ work up did not show infections   *- on albuterol as needed  *-Cover with antibiotic  *-Bronchodilator and ICS  *-Continue her pirfenidone and we will asked  to help make sure that she get her medication at home  PFT   Discuss about the back surgery ,she understand it is very high risk from pulmonary stand point ,however she agreed to proceed understanding risk of RF ,etc   I recommended she get observed at 24 hour post surgery until she is to base line  If possible to go with spinal anaesthesia  To get pulmonary involved

## 2022-08-26 NOTE — PROGRESS NOTES
Comprehensive Nutrition Assessment    Type and Reason for Visit:  Reassess    Nutrition Recommendations/Plan:   Continue general diet   Resume Ensure BID   Encourage PO intakes   Monitor nutrition adequacy, pertinent labs, bowel habits, wt changes, and clinical progress     Malnutrition Assessment:  Malnutrition Status:  Severe malnutrition (08/17/22 1341)    Context:  Chronic Illness     Findings of the 6 clinical characteristics of malnutrition:  Energy Intake:  75% or less estimated energy requirements for 1 month or longer  Weight Loss:  Greater than 20% over 1 year     Muscle Mass Loss:  Severe muscle mass loss Temples (temporalis), Clavicles (pectoralis & deltoids), Hand (interosseous)    Nutrition Assessment:    Follow up: S/p colonoscopy on 8/25 with colon polyp removal and bleeding hemorrhoids, awaiting pathology. Diet resumed to general yesterday. Pt reports appetite continues to slowly improve. Encouraged PO intakes and importance of nutrition. Pt denies any nausea today. Agreeable to resuming ONS to promote nutrient intakes this admission. Will monitor. Nutrition Related Findings:    BM x8 on 8/24 - colonoscopy prep. Active BS. Trace BLE edema. K 3.3. Mg 1.70. Wound Type: Pressure Injury, Stage II, Surgical Incision       Current Nutrition Intake & Therapies:    Average Meal Intake: 0%, 1-25%, 26-50%, 51-75%  Average Supplements Intake: %  ADULT DIET; Regular    Anthropometric Measures:  Height: 5' 2\" (157.5 cm)  Ideal Body Weight (IBW): 110 lbs (50 kg)    Admission Body Weight: 138 lb (62.6 kg) (bed scale)  Current Body Weight: 128 lb (58.1 kg), 125.5 % IBW. Weight Source: Bed Scale  Current BMI (kg/m2): 23.4  Usual Body Weight: 174 lb (78.9 kg) (bed scale 1/24/22)  % Weight Change (Calculated): -20.7  Weight Adjustment For: No Adjustment                 BMI Categories: Overweight (BMI 25.0-29. 9)    Estimated Daily Nutrient Needs:  Energy Requirements Based On: Kcal/kg (25-30)  Weight Used for Energy Requirements: Current  Energy (kcal/day): 3389-4088 kcal  Weight Used for Protein Requirements: Current (1.2-1.5 g/kg)  Protein (g/day): 74-93 g  Method Used for Fluid Requirements: 1 ml/kcal  Fluid (ml/day): 1588-3011 mL    Nutrition Diagnosis:   Severe malnutrition related to inadequate protein-energy intake as evidenced by poor intake prior to admission, weight loss, severe muscle loss    Nutrition Interventions:   Food and/or Nutrient Delivery: Continue Current Diet, Start Oral Nutrition Supplement  Nutrition Education/Counseling: No recommendation at this time  Coordination of Nutrition Care: Continue to monitor while inpatient, Interdisciplinary Rounds  Plan of Care discussed with: Patient    Goals:  Previous Goal Met: Progressing toward Goal(s)  Goals: PO intake 50% or greater, prior to discharge       Nutrition Monitoring and Evaluation:   Behavioral-Environmental Outcomes: None Identified  Food/Nutrient Intake Outcomes: Food and Nutrient Intake, Supplement Intake  Physical Signs/Symptoms Outcomes: Biochemical Data, Nutrition Focused Physical Findings, Weight, Meal Time Behavior    Discharge Planning:    Continue current diet, Continue Oral Nutrition Supplement     Mari Hogue, MS, RD, LD  Contact: [phone]

## 2022-08-26 NOTE — PROGRESS NOTES
Occupational Therapy  Facility/Department: [facility] [place]  Rehabilitation Occupational Therapy Daily Treatment Note    Date: 22  Patient Name: Audra Guerra       Room: 9868/0523-24  MRN: [id number]  Account: [de-identified]   : 1955  (73 y.o.) Gender: female          Past Medical History:  has a past medical history of A-fib ([facility] [address] Anxiety, Cryptogenic organizing pneumonia ([facility] [address] Depression, GERD (gastroesophageal reflux disease), Hyperlipidemia, On home oxygen therapy, Rash, and Thyroid disease. Past Surgical History:   has a past surgical history that includes Cholecystectomy; bronchoscopy (N/A, 2019); bronchoscopy (2019); bronchoscopy (2019); bronchoscopy (2019); bronchoscopy (07/10/2019); bronchoscopy (N/A, 7/10/2019); Hysterectomy, total abdominal; bronchoscopy (Bilateral, 2019); bronchoscopy (N/A, 2019); bronchoscopy (N/A, 2019); Arm Surgery (Left, 3/2/2020); and Colonoscopy (N/A, 2022). Restrictions  Restrictions/Precautions: General Precautions; Fall Risk;Up as Tolerated  Spinal Precautions: TLSO when OOB, can jamial EOB; no lifting >10 lbs  Other position/activity restrictions: 5-6L O2 (initially 5L at rest, increasing to 6L with activity), TLSO, spinal precautions, ok to shower. Subjective  Subjective: pt excited and participatory for shower, improved overall particpiation and independence  Restrictions/Precautions: General Precautions; Fall Risk;Up as Tolerated        Vitals:    22 1539   BP: 110/73   Pulse: 75   Resp:    Temp:    SpO2: 100% on 6L no activity   pt on 6L throughout sesion, pt remaining >90% during toileting. however, after showering, pt desat to 80% requiring 1-2 minutes to recover to 90%. pt desat during dressing to 84%, recovering to 90% after 1 minute.      Objective             ADL  Grooming/Oral Hygiene  Assistance Level: Set-up  Skilled Clinical Factors: seated at sink, pt brush hair and brush teeth independently after set-up  Upper Extremity Bathing  Assistance Level: Set-up  Skilled Clinical Factors: pt washed UB 4/4 using Hand held shower ind. Lower Extremity Bathing  Skilled Clinical Factors: pt able to attain figure 4 position to wash and dry LE, pt able to wash anterior lynnette-care ind, but requring full assist for posterior lynnette-care 2/ 2spinal precuations. Upper Extremity Dressing  Assistance Level: Modified independent; Moderate assistance  Skilled Clinical Factors: modA to jamila brace, ind to doff brace. ind doffing all clothing. Lower Extremity Dressing  Assistance Level: Stand by assist  Skilled Clinical Factors: ind in donning/doff pants and ind in pants management up and down seated on EOB with SBA for standing balance. Putting On/Taking Off Footwear  Assistance Level: Independent  Skilled Clinical Factors: pt doffed socks independently. Toileting  Assistance Level: Increased time to complete;Verbal cues; Minimal assistance  Skilled Clinical Factors: Valeria for standing balance with RW vs grab bar  Toilet Transfers  Equipment: Beside commode;Raised toilet seat with arms  Additional Factors: Increased time to complete;Cues for hand placement; With handrails; Set-up; Verbal cues  Assistance Level: Minimal assistance  Skilled Clinical Factors: Valeria with increased time and PRN cues  Tub/Shower Transfers  Type: Shower  Transfer From: Rolling walker  Transfer To: Tub transfer bench  Additional Factors: Cues for hand placement;Verbal cues; Set-up; Increased time to complete; With handrails  Assistance Level: Contact guard assist  Skilled Clinical Factors: CGA transfer from toilet to TTB walk in shower with RW     Roll Right  Assistance Level: Modified independent  Sit to Supine  Assistance Level: Modified independent  Supine to Sit  Assistance Level: Modified independent       Assessment  Assessment  Assessment: Patient with increased tolerance this date.  reports less pain with mobility/transfers and overall grossly Valeria-CGA for sit <> stands. Pt does need consistent rest breaks and prolonged time to complete toileting, pt desat to 80-85% on 6L with 2-3 minutes to recover to 90%. Pt demo'ing good progress towards goals and improved ind in ADLs this date with set-up for UB showering and dressing, mod A for don/doffing TSLO, and CGA for LB dressing. Pt able to complete all aspects of LB showering with exception of poserior buttocks hygiene requiring max A secondary to maintaining spinal precautions and safety with standing. Pt completed all grooming tasks ind in seated position after set-up. Pt continuing to demo poor activiity mor and standing mor. Coninue POC. Activity Tolerance: Patient limited by fatigue;Patient tolerated treatment well;Patient limited by endurance     Patient Education  Education  Education Given To: Patient  Education Provided: Role of Therapy; ADL Function;Plan of Care;Equipment;DME/Home Modifications;Transfer Training;Precautions; Safety; Energy Conservation; Fall Prevention Strategies  Education Provided Comments: spinal precautions, adaptive ADL methods, PLB, energy conservation, rest breaks, safety with ADLs  Education Method: Verbal;Demonstration; Teach Back  Barriers to Learning: None  Education Outcome: Verbalized understanding;Demonstrated understanding;Continued education needed    Plan  Plan  Times per Week: 5 out of 7 days  Times per Day: Daily  Plan Weeks: 10 days  Current Treatment Recommendations: Strengthening;ROM;Balance training;Functional mobility training; Endurance training;Gait training;Neuromuscular re-education; Safety education & training;Pain management;Patient/Caregiver education & training;Equipment evaluation, education, & procurement; Modalities; Positioning;Self-Care / ADL; Home management training;Cognitive/Perceptual training;Coordination training    Goals  Patient Goals   Patient goals :  \"To go home and be out of pain\"  Short Term Goals  Time Frame for Short term goals: 5 days (8/21/22)  Short Term Goal 1: Pt will complete standing task x3 minutes SBA with LRAD-GOAL MET 8/23  Short Term Goal 2: Pt will complete toileting/transfer CGA with LRAD and DME PRN- EXTEND TO DC DATE - 8/26 CGA/min A  Short Term Goal 3: Pt will complete LE dressing Valeria using AE and LRAD PRN-GOAL MET 8/22  Long Term Goals  Time Frame for Long term goals : 10 days (8/26/22) - d/c date extended to 8/31  Long Term Goal 1: Pt will complete functional mobility in room/bathroom Rachael with LRAD for ADL management  Long Term Goal 2: Pt will complete IADL task standing vs sitting Rachael with LRAD  Long Term Goal 3: Pt will complete UE/LE bathing/dressing including donning/doffing brace (patient vs family education teachback) setup vs Rachael sitting vs standing with AE vs LRAD; ongoing Mod A UB dressing, Mod A LB dressing 8/20  Long Term Goal 4: Pt will complete toileting/transfer with LRAD and DME PRN Rachael    Therapy Time   Individual Concurrent Group Co-treatment   Time In 1330         Time Out 1500         Minutes 90         Timed Code Treatment Minutes: 90 Minutes       Bree OT

## 2022-08-26 NOTE — PROGRESS NOTES
Physical Therapy  Facility/Department: [facility]  Rehabilitation Physical Therapy Treatment Note    NAME: Loree Obando  : 1955 (73 y.o.)  MRN: [id number]  CODE STATUS: Full Code    Date of Service: 22       Restrictions:  Restrictions/Precautions: General Precautions; Fall Risk;Up as Tolerated  Position Activity Restriction  Spinal Precautions: No Bending; No Twisting; No Lifting  Spinal Precautions: TLSO when OOB, can jamila EOB; no lifting >10 lbs  Other position/activity restrictions: 5-6L O2 (initially 5L at rest, increasing to 6L with activity), TLSO, spinal precautions, ok to shower. SUBJECTIVE  Subjective  Subjective: Pt sidelying in bed upon arrival and agreeable to PT session this morning. Reports feeling \"better\" this morning however does state \"I'm having trouble with my oxygen. They have to bump me up to 5 liters\"  Pain: Pt reports back pain (no number stated) and headache (no number stated), however requests tylenol for headache. RN notified and administers medication during session            OBJECTIVE  Orientation  Overall Orientation Status: Within Normal Limits  Orientation Level: Oriented X4    Functional Mobility  Supine to Sit  Assistance Level: Supervision  Skilled Clinical Factors: HOB elevated, use of BR, and increased time to complete with log roll technique  Scooting  Assistance Level: Stand by assist  Skilled Clinical Factors: to scoot hips to EOB  Transfers  Surface: From bed;Standard toilet  Additional Factors: Verbal cues; Hand placement cues; Increased time to complete  Device: Walker (RW)  Sit to Stand  Assistance Level: Contact guard assist;Minimal assistance  Skilled Clinical Factors: CGA from EOB up to RW, min A from toilet up to RW; pt requires increased time and min cues for hand placement during all transfer reps  Stand to Sit  Assistance Level: Contact guard assist  Skilled Clinical Factors: with increased time d/t pain      Environmental Mobility  Ambulation  Surface: Level surface  Device: Rolling walker  Distance: 10ft + 10ft (in/out of bathroom)  Activity: Within Room  Activity Comments: pt unable to tolerate increased distance or additional bouts of amb d/t fatigue and O2 sat with minimal amb in room  Additional Factors: Verbal cues; Hand placement cues; Increased time to complete  Assistance Level: Contact guard assist  Gait Deviations: Slow mike;Decreased step length bilateral;Decreased weight shift bilateral;Decreased heel strike right;Decreased heel strike left; Unsteady gait; Wide base of support;Path deviations  Skilled Clinical Factors: Pt ambulates 10ft from EOB into bathroom- O2 sats 83% on 5L after sitting, requiring ~3 mins to recover to 93%. After ambulating ~10ft out of bathroom to recliner chair, O2 sats 72% on 5L with minimal recovery. Increased pt's O2 to 6L, with O2 sats recovering to 90% after ~5 mins seated rest and pursed lip breathing. Pt states \"I don't think I can walk any more today\". Pt left on 6L for remainder of PT session. RN notified. PT Exercises  Exercise Treatment:   Seated BLE exercises: ankle pumps x 25, LAQ x 20, seated marching x 20, glute sets x 20, seated clamshells x 20 (squeezing pillow), resisted hip abd x 20 (red TB), resisted knee flexion x 20 (red TB)   Pt requires intermittent rest breaks during exercise completion d/t fatigue. O2 sats maintained 92% or greater on 6L throughout. Static Standing Balance Exercises:   Standing in bathroom in front of toilet x 30 sec with CGA while PT dependently completes pulling up brief after toileting. Pt unable to use either UE to assist d/t increased reliance on UE support on RW for balance when standing.       ASSESSMENT/PROGRESS TOWARDS GOALS  Vital Signs  Heart Rate: 75  Heart Rate Source: Monitor  BP: (!) 106/54  BP Location: Right upper arm  MAP (Calculated): 71.33  SpO2: 98 %  O2 Device: Nasal cannula (5L)  Comment: Pt initially on 5L O2 during session, however after ambulating out of bathroom, O2 sats decrease to 72%. Pt increased to 6L O2 however O2 sats take ~5 mins to recover to 90% or greater with seated rest. Pt left on 6L O2 for remainder of PT session    Assessment  Assessment: Pt mayelin's improved activity tolerance and participation this date compared to previous session. Although reporting fatigue this morning, she states she feels \"better than yesterday\" and is agreeable to attempting ambulation in room. Pt able to complete bed mobility with SBA, functional transfers with CGA-min A, and ambulate up to 10ft with CGA using RW. Pt with increased O2 requirement this date, initially on 5L at rest increasing to 6L with activity d/t O2 desat to 72% after amb out of bathroom. Pt requires prolonged seated rest breaks after each short bout of ambulation (~10ft) in room. Tolerated LE seated exercises for strengthening with intermittent rest breaks. Pt remains limited by deconditioning, generalized weakness, balance deficits, and O2 desat with activity, however she is cooperative and willing to participate as able. Will continue to progress functional mobility as appropriate.   Activity Tolerance: Patient limited by fatigue;Patient tolerated treatment well;Patient limited by endurance  Discharge Recommendations: 24 hour supervision or assist;Home with Home health PT  PT Equipment Recommendations  Equipment Needed: Yes  Mobility Devices: Haynes Ganser: Rolling  Other: Pt also has w/c at home    Goals  Patient Goals   Patient goals : Carrie Cantu be able to walk straight\"  Short Term Goals  Time Frame for Short term goals: 5 days- 8/20/22  Short term goal 1: Pt will complete bed mobility with SBA, with min cues for log roll technique -- 8/20: GOAL PARTIALLY MET SBA with bed rails and cues for log roll  Short term goal 2: Pt will complete functional transfers with CGA consistently, using LRAD -- 8/23: GOAL PARTIALLY MET pt can transfer at [place] [id number], but after an hour of PT requires MIN A due to fatigue. Short term goal 3: Pt will ambulate 25ft with min A using RW -- 8/23 GOAL MET  Long Term Goals  Time Frame for Long term goals : 10 days- 8/26/22-- goals extended to 8/31 based on updated d/c date  Long term goal 1: Pt will complete bed mobility with mod I without cues for log roll technique  Long term goal 2: Pt will complete functional transfers with mod I using LRAD  Long term goal 3: Pt will ambulate 50ft with supervision using LRAD  Long term goal 4: Pt will complete car transfer with supervision using LRAD  Long term goal 5: Pt will verbalize 3/3 spinal precautions without cues and adhere to precautions with mobility without cues    PLAN OF CARE/SAFETY  Plan  Plan: 5-7 times per week  Plan weeks: 10 days  Current Treatment Recommendations: Strengthening;Balance training;Functional mobility training;Transfer training; Endurance training; Wheelchair mobility training;Gait training;Stair training;Equipment evaluation, education, & procurement; Neuromuscular re-education;Home exercise program;Safety education & training;Patient/Caregiver education & training;Positioning  Safety Devices  Type of Devices: All fall risk precautions in place;Call light within reach; Chair alarm in place; Patient at risk for falls; Left in chair;Nurse notified    EDUCATION  Education  Education Given To: Patient  Education Provided: Role of Therapy;Cognition;Plan of Care;Family Education;Mobility Training;Equipment;Precautions;Transfer Training; Safety; Energy Conservation; Fall Prevention Strategies;DME/Home Modifications  Education Provided Comments: Educated on importance of OOB mobility, spinal px and use of TLSO, functional mobility and positional changes with back pain, LE exercises, ambulation  Education Method: Demonstration;Verbal  Barriers to Learning: None  Education Outcome: Verbalized understanding;Demonstrated understanding;Continued education needed        Therapy Time   Individual Concurrent Group Co-treatment   Time In 1030         Time Out 1130         Minutes 60           Timed Code Treatment Minutes: [address] [facility] Catherine, PT, DPT 08/26/22 at 2:32 PM

## 2022-08-27 PROCEDURE — 2700000000 HC OXYGEN THERAPY PER DAY

## 2022-08-27 PROCEDURE — 6370000000 HC RX 637 (ALT 250 FOR IP): Performed by: INTERNAL MEDICINE

## 2022-08-27 PROCEDURE — 94640 AIRWAY INHALATION TREATMENT: CPT

## 2022-08-27 PROCEDURE — 94761 N-INVAS EAR/PLS OXIMETRY MLT: CPT

## 2022-08-27 PROCEDURE — 1280000000 HC REHAB R&B

## 2022-08-27 PROCEDURE — 97110 THERAPEUTIC EXERCISES: CPT

## 2022-08-27 PROCEDURE — 97535 SELF CARE MNGMENT TRAINING: CPT

## 2022-08-27 PROCEDURE — 6370000000 HC RX 637 (ALT 250 FOR IP): Performed by: STUDENT IN AN ORGANIZED HEALTH CARE EDUCATION/TRAINING PROGRAM

## 2022-08-27 PROCEDURE — 6360000002 HC RX W HCPCS: Performed by: STUDENT IN AN ORGANIZED HEALTH CARE EDUCATION/TRAINING PROGRAM

## 2022-08-27 PROCEDURE — 97530 THERAPEUTIC ACTIVITIES: CPT

## 2022-08-27 RX ADMIN — CETIRIZINE HYDROCHLORIDE 10 MG: 10 TABLET, FILM COATED ORAL at 08:06

## 2022-08-27 RX ADMIN — IPRATROPIUM BROMIDE AND ALBUTEROL SULFATE 1 AMPULE: 2.5; .5 SOLUTION RESPIRATORY (INHALATION) at 12:00

## 2022-08-27 RX ADMIN — NADOLOL 20 MG: 20 TABLET ORAL at 08:05

## 2022-08-27 RX ADMIN — SERTRALINE HYDROCHLORIDE 200 MG: 50 TABLET ORAL at 08:05

## 2022-08-27 RX ADMIN — IPRATROPIUM BROMIDE AND ALBUTEROL SULFATE 1 AMPULE: 2.5; .5 SOLUTION RESPIRATORY (INHALATION) at 20:51

## 2022-08-27 RX ADMIN — NICOTINE POLACRILEX 20 MG: 4 GUM, CHEWING ORAL at 08:08

## 2022-08-27 RX ADMIN — LEVOTHYROXINE SODIUM 125 MCG: 0.12 TABLET ORAL at 05:16

## 2022-08-27 RX ADMIN — ATORVASTATIN CALCIUM 40 MG: 40 TABLET, FILM COATED ORAL at 08:06

## 2022-08-27 RX ADMIN — HEPARIN SODIUM 5000 UNITS: 5000 INJECTION INTRAVENOUS; SUBCUTANEOUS at 14:34

## 2022-08-27 RX ADMIN — HEPARIN SODIUM 5000 UNITS: 5000 INJECTION INTRAVENOUS; SUBCUTANEOUS at 21:14

## 2022-08-27 RX ADMIN — HEPARIN SODIUM 5000 UNITS: 5000 INJECTION INTRAVENOUS; SUBCUTANEOUS at 05:17

## 2022-08-27 RX ADMIN — MIRTAZAPINE 30 MG: 15 TABLET, FILM COATED ORAL at 21:13

## 2022-08-27 RX ADMIN — METHOCARBAMOL TABLETS 750 MG: 750 TABLET, COATED ORAL at 08:06

## 2022-08-27 RX ADMIN — OXYCODONE AND ACETAMINOPHEN 1 TABLET: 5; 325 TABLET ORAL at 21:13

## 2022-08-27 RX ADMIN — METHOCARBAMOL TABLETS 750 MG: 750 TABLET, COATED ORAL at 21:12

## 2022-08-27 RX ADMIN — PANTOPRAZOLE SODIUM 40 MG: 40 TABLET, DELAYED RELEASE ORAL at 05:16

## 2022-08-27 RX ADMIN — IPRATROPIUM BROMIDE AND ALBUTEROL SULFATE 1 AMPULE: 2.5; .5 SOLUTION RESPIRATORY (INHALATION) at 07:20

## 2022-08-27 RX ADMIN — OXYCODONE AND ACETAMINOPHEN 1 TABLET: 5; 325 TABLET ORAL at 08:06

## 2022-08-27 RX ADMIN — METHOCARBAMOL TABLETS 750 MG: 750 TABLET, COATED ORAL at 14:34

## 2022-08-27 RX ADMIN — Medication 400 MG: at 08:06

## 2022-08-27 RX ADMIN — POTASSIUM CHLORIDE 10 MEQ: 750 TABLET, EXTENDED RELEASE ORAL at 21:13

## 2022-08-27 RX ADMIN — HYDROCORTISONE ACETATE 25 MG: 25 SUPPOSITORY RECTAL at 08:06

## 2022-08-27 RX ADMIN — POTASSIUM CHLORIDE 10 MEQ: 750 TABLET, EXTENDED RELEASE ORAL at 08:06

## 2022-08-27 ASSESSMENT — PAIN DESCRIPTION - LOCATION
LOCATION: BACK;SHOULDER;LEG
LOCATION: BACK

## 2022-08-27 ASSESSMENT — PAIN DESCRIPTION - DESCRIPTORS: DESCRIPTORS: ACHING

## 2022-08-27 ASSESSMENT — PAIN SCALES - GENERAL
PAINLEVEL_OUTOF10: 5
PAINLEVEL_OUTOF10: 5
PAINLEVEL_OUTOF10: 4

## 2022-08-27 ASSESSMENT — PAIN DESCRIPTION - PAIN TYPE: TYPE: ACUTE PAIN

## 2022-08-27 ASSESSMENT — PAIN DESCRIPTION - ORIENTATION: ORIENTATION: LOWER

## 2022-08-27 NOTE — PLAN OF CARE
Problem: Pain  Goal: Verbalizes/displays adequate comfort level or baseline comfort level  8/27/2022 0018 by Adalgisa Gillette RN  Outcome: Progressing  8/26/2022 1826 by Jodi Mckeon RN  Outcome: Progressing     Problem: Skin/Tissue Integrity  Goal: Absence of new skin breakdown  Description: 1. Monitor for areas of redness and/or skin breakdown  2. Assess vascular access sites hourly  3. Every 4-6 hours minimum:  Change oxygen saturation probe site  4. Every 4-6 hours:  If on nasal continuous positive airway pressure, respiratory therapy assess nares and determine need for appliance change or resting period.   8/27/2022 0018 by Adalgisa Gillette RN  Outcome: Progressing  8/26/2022 1826 by Jodi Mckeon RN  Outcome: Progressing     Problem: Nutrition Deficit:  Goal: Optimize nutritional status  8/27/2022 0018 by Adalgisa Gillette RN  Outcome: Progressing  8/26/2022 1826 by Jodi Mckeon RN  Outcome: Progressing

## 2022-08-27 NOTE — PROGRESS NOTES
Occupational Therapy  Facility/Department: [facility]  Rehabilitation Occupational Therapy Daily Treatment Note    Date: 22  Patient Name: Mackenzie Edward       Room: 8967/1707-45  MRN: [id number]  Account: [de-identified]   : 1955  (73 y.o.) Gender: female                    Past Medical History:  has a past medical history of A-fib ([facility] [address] Anxiety, Cryptogenic organizing pneumonia ([facility] [address] Depression, GERD (gastroesophageal reflux disease), Hyperlipidemia, On home oxygen therapy, Rash, and Thyroid disease. Past Surgical History:   has a past surgical history that includes Cholecystectomy; bronchoscopy (N/A, 2019); bronchoscopy (2019); bronchoscopy (2019); bronchoscopy (2019); bronchoscopy (07/10/2019); bronchoscopy (N/A, 7/10/2019); Hysterectomy, total abdominal; bronchoscopy (Bilateral, 2019); bronchoscopy (N/A, 2019); bronchoscopy (N/A, 2019); Arm Surgery (Left, 3/2/2020); and Colonoscopy (N/A, 2022). Restrictions  Restrictions/Precautions: General Precautions; Fall Risk;Up as Tolerated  Spinal Precautions: TLSO when OOB, can jamila EOB; no lifting >10 lbs  Other position/activity restrictions: 5-6L O2 (initially 5L at rest, increasing to 6L with activity), TLSO, spinal precautions, ok to shower. Subjective  Subjective: Pt up at EOB and eager for therapy  Restrictions/Precautions: General Precautions; Fall Risk;Up as Tolerated             Objective     Cognition  Overall Cognitive Status: Exceptions  Arousal/Alertness: Appropriate responses to stimuli  Following Commands: Follows all commands without difficulty; Follows multistep commands with repitition  Attention Span: Attends with cues to redirect  Memory: Appears intact  Safety Judgement: Decreased awareness of need for safety  Problem Solving: Assistance required to identify errors made;Assistance required to correct errors made;Assistance required to implement solutions;Assistance required to generate solutions  Insights: Decreased awareness of deficits  Initiation: Requires cues for some  Sequencing: Requires cues for some  Orientation  Overall Orientation Status: Within Normal Limits  Orientation Level: Oriented X4         ADL  Feeding  Assistance Level: Increased time to complete;Stand by assist;Set-up  Grooming/Oral Hygiene  Assistance Level: Set-up  Skilled Clinical Factors: seated at sink, pt brush hair and brush teeth independently after set-up  Putting On/Taking Off Footwear  Assistance Level: Independent  Toileting  Assistance Level: Increased time to complete;Verbal cues; Minimal assistance  Skilled Clinical Factors: Valeria for standing balance with RW vs grab bar  Toilet Transfers  Equipment: Beside commode;Raised toilet seat with arms (over toilet)  Additional Factors: Increased time to complete;Cues for hand placement; With handrails; Set-up; Verbal cues  Assistance Level: Minimal assistance  Skilled Clinical Factors: Valeria with increased time and PRN cues          Functional Mobility  Device: Rolling walker  Activity: Transport items; To/From bathroom  Assistance Level: Minimal assistance  Skilled Clinical Factors: CGa-Valeria sit <> stand this date depending on pain/fatigue,  Transfers  Surface: To chair with arms; Bedside commode;From chair with arms; Wheelchair  Additional Factors: Set-up; Verbal cues; Hand placement cues; Increased time to complete; With handrails  Device: Walker  Sit to Stand  Assistance Level: Minimal assistance  Skilled Clinical Factors: Pt requiring increased assistance with transfers as pt fatigues. Pt transfers from [address]  [facility] using RW. Pt transfers to/from w/c Valeria-modA using RW. Pt transfers from [address] [facility] with RW and mod cues using grab bar. Stand to Sit  Assistance Level: Moderate assistance;Minimal assistance  Skilled Clinical Factors: Pt requiring Min-Mod A for stand to sit transfers.   Stand Pivot  Assistance Level: Minimal assistance   OT Exercises  Exercise Treatment: x15 with 1# free weight  A/AROM Exercises: x15 BUE wrist flexion/extension, supination/pronation, elbow flexion/extension, shoulder elevation/depression     Assessment  Assessment  Assessment: Pt pleasant and agreeable to therapy. Pt able to complete functional mobility in the bathroom with SBA-CGa. Pt fatigued quickly and SOB requiring frequent rest breaks and increased time. Pt oxygen levels dropped with all activity. Pt required 6-8 minutes for recovery on O2. pt completed toileting with CGA-min a. Pt requires verbal cues for hand placement and increased time to complete adls. Pt verbalized understanding of pursed lip breathing and demonstrated understanding. Pt able to complete seating level feeding with set up for containers and SBA. Pt required frequent rest breaks with all activity due to deconditioning and SOB. BUE exercises seated for improved enddurance and ROM. Cont with POC. Activity Tolerance: Patient limited by fatigue;Patient tolerated treatment well;Patient limited by endurance  Discharge Recommendations: 24 hour supervision or assist;Home with assist PRN;Home with nursing aide;Home with Home health OT;S Level 3  OT Equipment Recommendations  Walker: Rolling  Wheelchair: Standard; Wheelchair Cushion Standard  ADL Assistive Devices: Toileting - Standard Commode; Shower Chair with back;Grab Bars - toilet;Grab Bars - shower; Reacher;Sock-Aid Hard;Long-handled Sponge  Other: Patient would benefit from [facility] due to being confined to room at night and to provide support with toileting/transfers. Without a BSC pt is at a higher risk of falls. Safety Devices  Safety Devices in place: Yes  Type of devices: Call light within reach; Patient at risk for falls; All fall risk precautions in place;Gait belt;Nurse notified; Chair alarm in place; Left in chair    Patient Education  Education  Education Given To: Patient  Education Provided: Role of Therapy; ADL Function;Plan of Care;Equipment;DME/Home Modifications;Transfer Training;Precautions; Safety; Energy Conservation; Fall Prevention Strategies  Education Provided Comments: spinal precautions, adaptive ADL methods, PLB, energy conservation, rest breaks, safety with ADLs  Education Method: Verbal;Demonstration; Teach Back  Barriers to Learning: None  Education Outcome: Verbalized understanding;Demonstrated understanding;Continued education needed    Plan  Plan  Times per Week: 5 out of 7 days  Times per Day: Daily  Plan Weeks: 10 days  Current Treatment Recommendations: Strengthening;ROM;Balance training;Functional mobility training; Endurance training;Gait training;Neuromuscular re-education; Safety education & training;Pain management;Patient/Caregiver education & training;Equipment evaluation, education, & procurement; Modalities; Positioning;Self-Care / ADL; Home management training;Cognitive/Perceptual training;Coordination training    Goals  Patient Goals   Patient goals :  \"To go home and be out of pain\"  Short Term Goals  Time Frame for Short term goals: 5 days (8/21/22)  Short Term Goal 1: Pt will complete standing task x3 minutes SBA with LRAD-GOAL MET 8/23  Short Term Goal 2: Pt will complete toileting/transfer CGA with LRAD and DME PRN- EXTEND TO DC DATE - 8/26 CGA/min A  Short Term Goal 3: Pt will complete LE dressing Valeria using AE and LRAD PRN-GOAL MET 8/22  Long Term Goals  Time Frame for Long term goals : 10 days (8/26/22) - d/c date extended to 8/31  Long Term Goal 1: Pt will complete functional mobility in room/bathroom Rachael with LRAD for ADL management  Long Term Goal 2: Pt will complete IADL task standing vs sitting Rachael with LRAD  Long Term Goal 3: Pt will complete UE/LE bathing/dressing including donning/doffing brace (patient vs family education teachback) setup vs Rachael sitting vs standing with AE vs LRAD; ongoing Mod A UB dressing, Mod A LB dressing 8/20  Long Term Goal 4: Pt will complete toileting/transfer with LRAD and DME PRN Rachael      Therapy Time   Individual Concurrent Group Co-treatment   Time In 0730         Time Out 0900         Minutes 90         Timed Code Treatment Minutes: 90 Minutes       Tico Griffith OTR/L

## 2022-08-28 PROCEDURE — 6370000000 HC RX 637 (ALT 250 FOR IP): Performed by: INTERNAL MEDICINE

## 2022-08-28 PROCEDURE — 6370000000 HC RX 637 (ALT 250 FOR IP): Performed by: STUDENT IN AN ORGANIZED HEALTH CARE EDUCATION/TRAINING PROGRAM

## 2022-08-28 PROCEDURE — 94640 AIRWAY INHALATION TREATMENT: CPT

## 2022-08-28 PROCEDURE — 94761 N-INVAS EAR/PLS OXIMETRY MLT: CPT

## 2022-08-28 PROCEDURE — 6360000002 HC RX W HCPCS: Performed by: STUDENT IN AN ORGANIZED HEALTH CARE EDUCATION/TRAINING PROGRAM

## 2022-08-28 PROCEDURE — 2700000000 HC OXYGEN THERAPY PER DAY

## 2022-08-28 PROCEDURE — 1280000000 HC REHAB R&B

## 2022-08-28 RX ADMIN — PANTOPRAZOLE SODIUM 40 MG: 40 TABLET, DELAYED RELEASE ORAL at 06:05

## 2022-08-28 RX ADMIN — SERTRALINE HYDROCHLORIDE 200 MG: 50 TABLET ORAL at 09:50

## 2022-08-28 RX ADMIN — HYDROCORTISONE ACETATE 25 MG: 25 SUPPOSITORY RECTAL at 20:25

## 2022-08-28 RX ADMIN — NADOLOL 20 MG: 20 TABLET ORAL at 09:49

## 2022-08-28 RX ADMIN — IPRATROPIUM BROMIDE AND ALBUTEROL SULFATE 1 AMPULE: 2.5; .5 SOLUTION RESPIRATORY (INHALATION) at 19:35

## 2022-08-28 RX ADMIN — METHOCARBAMOL TABLETS 750 MG: 750 TABLET, COATED ORAL at 09:49

## 2022-08-28 RX ADMIN — OXYCODONE AND ACETAMINOPHEN 1 TABLET: 5; 325 TABLET ORAL at 06:05

## 2022-08-28 RX ADMIN — ERGOCALCIFEROL 50000 UNITS: 1.25 CAPSULE ORAL at 10:08

## 2022-08-28 RX ADMIN — HYDROCORTISONE ACETATE 25 MG: 25 SUPPOSITORY RECTAL at 09:50

## 2022-08-28 RX ADMIN — PIRFENIDONE 801 MG: 801 TABLET, FILM COATED ORAL at 19:10

## 2022-08-28 RX ADMIN — ACETAMINOPHEN 1000 MG: 500 TABLET ORAL at 17:09

## 2022-08-28 RX ADMIN — POTASSIUM CHLORIDE 10 MEQ: 750 TABLET, EXTENDED RELEASE ORAL at 09:50

## 2022-08-28 RX ADMIN — ATORVASTATIN CALCIUM 40 MG: 40 TABLET, FILM COATED ORAL at 09:50

## 2022-08-28 RX ADMIN — OXYCODONE AND ACETAMINOPHEN 2 TABLET: 5; 325 TABLET ORAL at 19:51

## 2022-08-28 RX ADMIN — HEPARIN SODIUM 5000 UNITS: 5000 INJECTION INTRAVENOUS; SUBCUTANEOUS at 20:22

## 2022-08-28 RX ADMIN — NICOTINE POLACRILEX 20 MG: 4 GUM, CHEWING ORAL at 07:32

## 2022-08-28 RX ADMIN — MIRTAZAPINE 30 MG: 15 TABLET, FILM COATED ORAL at 20:17

## 2022-08-28 RX ADMIN — Medication 400 MG: at 09:50

## 2022-08-28 RX ADMIN — HEPARIN SODIUM 5000 UNITS: 5000 INJECTION INTRAVENOUS; SUBCUTANEOUS at 13:37

## 2022-08-28 RX ADMIN — METHOCARBAMOL TABLETS 750 MG: 750 TABLET, COATED ORAL at 13:37

## 2022-08-28 RX ADMIN — LEVOTHYROXINE SODIUM 125 MCG: 0.12 TABLET ORAL at 06:05

## 2022-08-28 RX ADMIN — OXYCODONE AND ACETAMINOPHEN 1 TABLET: 5; 325 TABLET ORAL at 13:36

## 2022-08-28 RX ADMIN — IPRATROPIUM BROMIDE AND ALBUTEROL SULFATE 1 AMPULE: 2.5; .5 SOLUTION RESPIRATORY (INHALATION) at 08:57

## 2022-08-28 RX ADMIN — METHOCARBAMOL TABLETS 750 MG: 750 TABLET, COATED ORAL at 20:17

## 2022-08-28 RX ADMIN — PIRFENIDONE 801 MG: 801 TABLET, FILM COATED ORAL at 09:51

## 2022-08-28 RX ADMIN — POTASSIUM CHLORIDE 10 MEQ: 750 TABLET, EXTENDED RELEASE ORAL at 20:17

## 2022-08-28 RX ADMIN — IPRATROPIUM BROMIDE AND ALBUTEROL SULFATE 1 AMPULE: 2.5; .5 SOLUTION RESPIRATORY (INHALATION) at 13:22

## 2022-08-28 RX ADMIN — CETIRIZINE HYDROCHLORIDE 10 MG: 10 TABLET, FILM COATED ORAL at 09:50

## 2022-08-28 RX ADMIN — HEPARIN SODIUM 5000 UNITS: 5000 INJECTION INTRAVENOUS; SUBCUTANEOUS at 06:05

## 2022-08-28 ASSESSMENT — PAIN DESCRIPTION - LOCATION
LOCATION: BACK
LOCATION: HEAD
LOCATION: BACK
LOCATION: BACK

## 2022-08-28 ASSESSMENT — PAIN DESCRIPTION - ORIENTATION
ORIENTATION: LOWER

## 2022-08-28 ASSESSMENT — PAIN SCALES - GENERAL
PAINLEVEL_OUTOF10: 4
PAINLEVEL_OUTOF10: 7
PAINLEVEL_OUTOF10: 2
PAINLEVEL_OUTOF10: 0
PAINLEVEL_OUTOF10: 3
PAINLEVEL_OUTOF10: 6

## 2022-08-28 ASSESSMENT — PAIN DESCRIPTION - DESCRIPTORS
DESCRIPTORS: ACHING
DESCRIPTORS: ACHING

## 2022-08-28 ASSESSMENT — PAIN - FUNCTIONAL ASSESSMENT
PAIN_FUNCTIONAL_ASSESSMENT: PREVENTS OR INTERFERES SOME ACTIVE ACTIVITIES AND ADLS
PAIN_FUNCTIONAL_ASSESSMENT: ACTIVITIES ARE NOT PREVENTED

## 2022-08-28 NOTE — PLAN OF CARE
Problem: Pain  Pt a/o, rates pain appropriately using 0-10 pain scale; pt calls out as needed for pain intervention; will continue to monitor and administer intervention as ordered and requested. Problem: Skin/Tissue Integrity  Goal: Absence of new skin breakdown  Description: 1.   Monitor for areas of redness and/or skin breakdown    Problem: Nutrition Deficit:  Goal: Optimize nutritional status  Outcome: Progressing    Goal: Verbalizes/displays adequate comfort level or baseline comfort level  Outcome: Progressing

## 2022-08-29 LAB
ANION GAP SERPL CALCULATED.3IONS-SCNC: 6 MMOL/L (ref 3–16)
BUN BLDV-MCNC: 7 MG/DL (ref 7–20)
CALCIUM SERPL-MCNC: 8.1 MG/DL (ref 8.3–10.6)
CHLORIDE BLD-SCNC: 101 MMOL/L (ref 99–110)
CO2: 33 MMOL/L (ref 21–32)
CREAT SERPL-MCNC: <0.5 MG/DL (ref 0.6–1.2)
GFR AFRICAN AMERICAN: >60
GFR NON-AFRICAN AMERICAN: >60
GLUCOSE BLD-MCNC: 93 MG/DL (ref 70–99)
HCT VFR BLD CALC: 26.2 % (ref 36–48)
HEMOGLOBIN: 8.4 G/DL (ref 12–16)
MCH RBC QN AUTO: 27.7 PG (ref 26–34)
MCHC RBC AUTO-ENTMCNC: 32 G/DL (ref 31–36)
MCV RBC AUTO: 86.4 FL (ref 80–100)
PDW BLD-RTO: 17.9 % (ref 12.4–15.4)
PLATELET # BLD: 187 K/UL (ref 135–450)
PMV BLD AUTO: 7.6 FL (ref 5–10.5)
POTASSIUM REFLEX MAGNESIUM: 4.3 MMOL/L (ref 3.5–5.1)
RBC # BLD: 3.03 M/UL (ref 4–5.2)
SODIUM BLD-SCNC: 140 MMOL/L (ref 136–145)
WBC # BLD: 5.8 K/UL (ref 4–11)

## 2022-08-29 PROCEDURE — 97129 THER IVNTJ 1ST 15 MIN: CPT

## 2022-08-29 PROCEDURE — 1280000000 HC REHAB R&B

## 2022-08-29 PROCEDURE — 97535 SELF CARE MNGMENT TRAINING: CPT

## 2022-08-29 PROCEDURE — 97530 THERAPEUTIC ACTIVITIES: CPT

## 2022-08-29 PROCEDURE — 97110 THERAPEUTIC EXERCISES: CPT

## 2022-08-29 PROCEDURE — 6370000000 HC RX 637 (ALT 250 FOR IP): Performed by: STUDENT IN AN ORGANIZED HEALTH CARE EDUCATION/TRAINING PROGRAM

## 2022-08-29 PROCEDURE — 6370000000 HC RX 637 (ALT 250 FOR IP): Performed by: REGISTERED NURSE

## 2022-08-29 PROCEDURE — 85027 COMPLETE CBC AUTOMATED: CPT

## 2022-08-29 PROCEDURE — 92507 TX SP LANG VOICE COMM INDIV: CPT

## 2022-08-29 PROCEDURE — 97116 GAIT TRAINING THERAPY: CPT

## 2022-08-29 PROCEDURE — 6370000000 HC RX 637 (ALT 250 FOR IP): Performed by: INTERNAL MEDICINE

## 2022-08-29 PROCEDURE — 36415 COLL VENOUS BLD VENIPUNCTURE: CPT

## 2022-08-29 PROCEDURE — 6360000002 HC RX W HCPCS: Performed by: STUDENT IN AN ORGANIZED HEALTH CARE EDUCATION/TRAINING PROGRAM

## 2022-08-29 PROCEDURE — 94761 N-INVAS EAR/PLS OXIMETRY MLT: CPT

## 2022-08-29 PROCEDURE — 94640 AIRWAY INHALATION TREATMENT: CPT

## 2022-08-29 PROCEDURE — 80048 BASIC METABOLIC PNL TOTAL CA: CPT

## 2022-08-29 PROCEDURE — 94760 N-INVAS EAR/PLS OXIMETRY 1: CPT

## 2022-08-29 PROCEDURE — 2700000000 HC OXYGEN THERAPY PER DAY

## 2022-08-29 RX ORDER — CALCIUM POLYCARBOPHIL 625 MG 625 MG/1
625 TABLET ORAL NIGHTLY
Status: DISCONTINUED | OUTPATIENT
Start: 2022-08-29 | End: 2022-09-02 | Stop reason: HOSPADM

## 2022-08-29 RX ADMIN — OXYCODONE AND ACETAMINOPHEN 2 TABLET: 5; 325 TABLET ORAL at 19:49

## 2022-08-29 RX ADMIN — HYDROCORTISONE ACETATE 25 MG: 25 SUPPOSITORY RECTAL at 19:50

## 2022-08-29 RX ADMIN — HEPARIN SODIUM 5000 UNITS: 5000 INJECTION INTRAVENOUS; SUBCUTANEOUS at 19:56

## 2022-08-29 RX ADMIN — LEVOTHYROXINE SODIUM 125 MCG: 0.12 TABLET ORAL at 06:31

## 2022-08-29 RX ADMIN — HYDROCORTISONE ACETATE 25 MG: 25 SUPPOSITORY RECTAL at 07:55

## 2022-08-29 RX ADMIN — MIRTAZAPINE 30 MG: 15 TABLET, FILM COATED ORAL at 19:49

## 2022-08-29 RX ADMIN — ACETAMINOPHEN 1000 MG: 500 TABLET ORAL at 00:05

## 2022-08-29 RX ADMIN — NICOTINE POLACRILEX 20 MG: 4 GUM, CHEWING ORAL at 07:48

## 2022-08-29 RX ADMIN — NADOLOL 20 MG: 20 TABLET ORAL at 07:55

## 2022-08-29 RX ADMIN — PANTOPRAZOLE SODIUM 40 MG: 40 TABLET, DELAYED RELEASE ORAL at 06:31

## 2022-08-29 RX ADMIN — METHOCARBAMOL TABLETS 750 MG: 750 TABLET, COATED ORAL at 14:17

## 2022-08-29 RX ADMIN — SERTRALINE HYDROCHLORIDE 200 MG: 50 TABLET ORAL at 07:55

## 2022-08-29 RX ADMIN — HEPARIN SODIUM 5000 UNITS: 5000 INJECTION INTRAVENOUS; SUBCUTANEOUS at 06:31

## 2022-08-29 RX ADMIN — IPRATROPIUM BROMIDE AND ALBUTEROL SULFATE 1 AMPULE: 2.5; .5 SOLUTION RESPIRATORY (INHALATION) at 08:30

## 2022-08-29 RX ADMIN — POTASSIUM CHLORIDE 10 MEQ: 750 TABLET, EXTENDED RELEASE ORAL at 07:56

## 2022-08-29 RX ADMIN — PIRFENIDONE 801 MG: 801 TABLET, FILM COATED ORAL at 07:56

## 2022-08-29 RX ADMIN — IPRATROPIUM BROMIDE AND ALBUTEROL SULFATE 1 AMPULE: 2.5; .5 SOLUTION RESPIRATORY (INHALATION) at 14:05

## 2022-08-29 RX ADMIN — CETIRIZINE HYDROCHLORIDE 10 MG: 10 TABLET, FILM COATED ORAL at 07:56

## 2022-08-29 RX ADMIN — ONDANSETRON 4 MG: 4 TABLET, ORALLY DISINTEGRATING ORAL at 09:22

## 2022-08-29 RX ADMIN — OXYCODONE AND ACETAMINOPHEN 1 TABLET: 5; 325 TABLET ORAL at 07:56

## 2022-08-29 RX ADMIN — ATORVASTATIN CALCIUM 40 MG: 40 TABLET, FILM COATED ORAL at 07:56

## 2022-08-29 RX ADMIN — CALCIUM POLYCARBOPHIL 625 MG: 625 TABLET, FILM COATED ORAL at 19:50

## 2022-08-29 RX ADMIN — IPRATROPIUM BROMIDE AND ALBUTEROL SULFATE 1 AMPULE: 2.5; .5 SOLUTION RESPIRATORY (INHALATION) at 19:11

## 2022-08-29 RX ADMIN — METHOCARBAMOL TABLETS 750 MG: 750 TABLET, COATED ORAL at 19:50

## 2022-08-29 RX ADMIN — HEPARIN SODIUM 5000 UNITS: 5000 INJECTION INTRAVENOUS; SUBCUTANEOUS at 14:17

## 2022-08-29 RX ADMIN — METHOCARBAMOL TABLETS 750 MG: 750 TABLET, COATED ORAL at 07:55

## 2022-08-29 RX ADMIN — Medication 400 MG: at 07:56

## 2022-08-29 RX ADMIN — POTASSIUM CHLORIDE 10 MEQ: 750 TABLET, EXTENDED RELEASE ORAL at 19:50

## 2022-08-29 ASSESSMENT — PAIN DESCRIPTION - LOCATION
LOCATION: HEAD;BACK
LOCATION: HEAD
LOCATION: BACK

## 2022-08-29 ASSESSMENT — PAIN SCALES - GENERAL
PAINLEVEL_OUTOF10: 6
PAINLEVEL_OUTOF10: 4
PAINLEVEL_OUTOF10: 8
PAINLEVEL_OUTOF10: 7
PAINLEVEL_OUTOF10: 5

## 2022-08-29 ASSESSMENT — PAIN DESCRIPTION - DESCRIPTORS: DESCRIPTORS: ACHING

## 2022-08-29 ASSESSMENT — PAIN DESCRIPTION - ONSET: ONSET: ON-GOING

## 2022-08-29 ASSESSMENT — PAIN DESCRIPTION - FREQUENCY: FREQUENCY: INTERMITTENT

## 2022-08-29 ASSESSMENT — PAIN DESCRIPTION - ORIENTATION: ORIENTATION: LOWER

## 2022-08-29 ASSESSMENT — PAIN DESCRIPTION - PAIN TYPE: TYPE: CHRONIC PAIN

## 2022-08-29 NOTE — PROGRESS NOTES
Physical Therapy  Facility/Department: [facility]  Rehabilitation Physical Therapy Treatment Note    NAME: David Obando  : 1955 (73 y.o.)  MRN: [id number]  CODE STATUS: Full Code    Date of Service: 22       Restrictions:  Restrictions/Precautions: General Precautions; Fall Risk;Up as Tolerated  Position Activity Restriction  Spinal Precautions: No Bending; No Twisting; No Lifting  Spinal Precautions: TLSO when OOB, can jamila EOB; no lifting >10 lbs  Other position/activity restrictions: 5-6L O2 (initially 5L at rest, increasing to 6L with activity), TLSO, spinal precautions, ok to shower. SUBJECTIVE  Subjective  Subjective: Pt up to [facility] on arrival, agreeable to PT tx, reports continued increased strength and mobility, pt does report concern regarding upcoming d/c  Pain: Pt denies c/o pain, does report some groin painwith mobility but does not provide a pain score                 OBJECTIVE  Orientation  Overall Orientation Status: Within Normal Limits    Functional Mobility  Balance  Sitting Balance: Supervision  Standing Balance: Contact guard assistance (With RW)  Standing Balance  Activity: Grossly CGA with RW  Pt completes dynamic balance/gait activity in community room with RW, grossly CGA for balance   Gait x 4 bouts x 15-20' each with RW   Reaching to grab bean bag placed at about waist level on table/chair, x 4 reps, CGA for balance   X 10 reps ea (one completed at each stop to grab bean bag) with RW and CGA    Calf raises    Marches    Hip abduction    Hip extension   Multiple t/f from w/c, lower chair without arms and higher chair with arms with grossly CGA. Pt takes seated rest break between following first two bouts of gait and between bout 3 and 4. Completed to improve dynamic balance ot promote improved safety and I within the home and community, improve activity tolerance and promote improved strength for decreased risk of falls.     Transfers  Surface: From bed;Standard toilet;From chair with arms;From chair without arms  Additional Factors: Verbal cues; Hand placement cues; Increased time to complete  Device: Walker (RW)  Sit to Stand  Assistance Level: Contact guard assist  Skilled Clinical Factors: Pt completes multiple t/f throughout session with RW with grossly CGA, demonstrates good hand placement, min cues for anterior WS  Stand to Sit  Assistance Level: Contact guard assist  Skilled Clinical Factors: Multiple t/f throughout session with RW with grossly CGA, good hand placement, min cues for safety  Bed To/From Chair  Technique: Stand pivot  Assistance Level: Contact guard assist  Skilled Clinical Factors: With [employer]  Assistance Level: Contact guard assist  Skilled Clinical Factors: With RW, cues for sequence/technique, hand placement, increased time to complete      Environmental Mobility  Ambulation  Surface: Level surface  Device: Rolling walker  Distance: 50' x 3 + 20'  Activity: Within Room; Within Unit;Retrieve Items  Activity Comments: Distances limited by SOB and fatigue, SpO2 during first drops to 79% on 4L, increased to 6L for remainder of session and SpO2 maintained >90% with one drop to mid 80's. Pt requires ~ 3-4 minutes seated rest break to recover SpO2 and recover from fatigue and SOB. Additional Factors: Verbal cues; Hand placement cues; Increased time to complete  Assistance Level: Contact guard assist  Gait Deviations: Slow mike;Decreased step length bilateral;Decreased weight shift bilateral;Decreased heel strike right;Decreased heel strike left; Unsteady gait; Wide base of support;Path deviations  Skilled Clinical Factors: Partial step through pattern, B decreased toe clearance and heel strike, B decreased TKE especially with fatigue, forward flexed trunk, increased UE support.                     ASSESSMENT/PROGRESS TOWARDS GOALS   /74    SpO2 95% on 4L O2 NC    Assessment  Assessment: Pt seen in am for PT tx, pt continues to demonstrate good progression towards goals, improved activity tolerance and decreased c/o pain. Pt does continue to be limited by decreased activity tolerance and decreased SpO2 with gait activities on 4L so O2 increased to 6L and grossly maintained >90% with intermittent drops to min 80's. Pt continues to require seated rest breaks with mobility to recover from fatigue, SOB and decreased SpO2. Pt requires grossly CGA for t/f, CGA for ambulation up to 50' with RW. Session focused on progression of dynamic gait activities and improving activity tolerance for progression of mobility. Pt does endorse concern regarding upcoming d/c, RN notified and left message for CM. Pt will benefit from continued skilled PT in ARU to address above deficits, will continue to progress mobility as tolerated. Activity Tolerance: Patient limited by fatigue;Patient tolerated treatment well;Patient limited by endurance  Discharge Recommendations: 24 hour supervision or assist;Home with Home health PT  PT Equipment Recommendations  Equipment Needed: Yes  Mobility Devices: Aliza Hopkins: Rolling  Other: Pt also has w/c at home    Goals  Patient Goals   Patient goals : Phoebe Rodriguez be able to walk straight\"  Short Term Goals  Time Frame for Short term goals: 5 days- 8/20/22  Short term goal 1: Pt will complete bed mobility with SBA, with min cues for log roll technique -- 8/20: GOAL PARTIALLY MET SBA with bed rails and cues for log roll  Short term goal 2: Pt will complete functional transfers with CGA consistently, using LRAD -- 8/23: GOAL PARTIALLY MET pt can transfer at [facility], but after an hour of PT requires MIN A due to fatigue.   Short term goal 3: Pt will ambulate 25ft with min A using RW -- 8/23 GOAL MET  Long Term Goals  Time Frame for Long term goals : 10 days- 8/26/22-- goals extended to 8/31 based on updated d/c date  Long term goal 1: Pt will complete bed mobility with mod I without cues for log roll technique  Long term goal 2: Pt will complete functional transfers with mod I using LRAD  Long term goal 3: Pt will ambulate 50ft with supervision using LRAD  Long term goal 4: Pt will complete car transfer with supervision using LRAD  Long term goal 5: Pt will verbalize 3/3 spinal precautions without cues and adhere to precautions with mobility without cues    PLAN OF CARE/SAFETY  Plan  Plan: 5-7 times per week  Plan weeks: 10 days  Specific Instructions for Next Treatment: Progress mobility as tolerated  Current Treatment Recommendations: Strengthening;Balance training;Functional mobility training;Transfer training; Endurance training; Wheelchair mobility training;Gait training;Stair training;Equipment evaluation, education, & procurement; Neuromuscular re-education;Home exercise program;Safety education & training;Patient/Caregiver education & training;Positioning  Safety Devices  Type of Devices: All fall risk precautions in place;Call light within reach; Chair alarm in place; Patient at risk for falls;Nurse notified; Bed alarm in place;Gait belt;Left in bed    EDUCATION  Education  Education Given To: Patient  Education Provided: Role of Therapy;Cognition;Plan of Care;Family Education;Mobility Training;Equipment;Precautions;Transfer Training; Safety; Energy Conservation; Fall Prevention Strategies;DME/Home Modifications  Education Provided Comments: Educated on importance of OOB mobility, spinal px and use of TLSO, functional mobility and positional changes with back pain, LE exercises, ambulation  Education Method: Demonstration;Verbal  Barriers to Learning: None  Education Outcome: Verbalized understanding;Demonstrated understanding;Continued education needed        Therapy Time   Individual Concurrent Group Co-treatment   Time In 1030         Time Out 1130         Minutes 60           Timed Code Treatment Minutes: [address], PT, LISA 08/29/22 at 2:48 PM

## 2022-08-29 NOTE — CARE COORDINATION
Clinicals faxed to insurance for .   Toñito Koch MPH, RRT  Clinical Liaison [address]  A)[phone]  (M)[phone]   Electronically signed by Toñito Koch on 8/29/2022 at 2:03 PM

## 2022-08-29 NOTE — PROGRESS NOTES
shower. Objective     Sit to Stand: Minimal Assistance (up to RW with increased time)  Stand to sit: Minimal Assistance  Bed to Chair: Minimal assistance, Moderate assistance (using RW via stand pivot transfer)     OT  PT Equipment Recommendations  Equipment Needed: Yes  Mobility Devices: Gladies Holes: Rolling  Other: Pt also has w/c at home  Toilet - Technique: Stand step, To left, To right (grab bar vs RW)  Equipment Used: Standard toilet  Toilet Transfers Comments: assist with lifting and lowering, would benefit from [facility] over toilet with RW vs grab bar for support  Assessment        SLP   Pt pleasant and cooperative, agreeable to participate. Pt completed repeat SLUMS scoring a 28/30 today compared to initial evaluation score of 26/30. Pt completed five step written sequencing task with 90% acc indep improving to 100% acc given min cues. Pt does demonstrate min difficulty with higher level executive functioning tasks (but has assist from  and dtr at home with meds/finances). Pt is making consistent progress towards goals and remains motivated to improve. Continue goals above. Body mass index is 23.23 kg/m². Assessment and Plan: Viji Moses is a 69year old female with a past medical history significant for chronic respiratory failure on 3 L home O2, ILD, COPD, paroxysmal atrial fibrillation, DM2, HTN, HLD, and thyroid disease who presented to  on 8/11/22 for planned T11-L3 PDFF with L1 corpectomy for L1 burst fracture. She was admitted to [facility] on 8/16/22 due to functional deficits below her baseline.      L1 Burst Fracture  - s/p T11-L3 PDFF with L1 corpectomy  - TLSO when OOB  - pain control  - staples removed 8/24  - incision open to air  - PT, OT     Chronic Respiratory Failure  ILD/COPD  - on 3 L home O2 at baseline  - now with increased oxygen requirement  - XR chest negative for acute process  - pifenidone TID, has not been regularly taking  - scheduled duo-nebs  - Pulm consulted, appreciate assistance. No acute process   - 3 L at rest and ok to be up to 6 L with activity  - humidified air     Large Bleeding hemorrhoids  - colonoscopy with GI today  - hydrocortisone suppositories per GI  - outpatient follow up    Colon polyp  - removed on colonoscopy 8/25  - follow up with GI OP    Hypokalemia  - resume daily supplement  - continue to monitor intermittently      pAF  - not been on [facility]  - monitor     DM2  - on januvia and jardiance at home, family brought in home meds  - glucose has been low due to poor PO intake, hold off on resuming home meds     Hypothyroidism  - synthroid     GERD  - PPI  - PRN omeprazole per home regimen     Anxiety/Depression  - zoloft, mirtazipine     HLD  - statin      Bowels: Schedule stool softener. Follow bowel movements. Enema or suppository if needed. Bladder: Check PVR x 3. [facility] if PVR > 200ml or if any volume is > 500 ml. Sleep: Trazodone provided prn. Follow up appointments: Ortho Spine, PCP, Pulm    Services: home health PT, OT, nursing  EDOD: 8/31/22    Rangel Albarran MD 8/29/2022, 10:54 AM

## 2022-08-29 NOTE — PROGRESS NOTES
Speech Language Pathology  [facility]: ACUTE REHAB UNIT  SPEECH-LANGUAGE PATHOLOGY      [x] Daily  [] Weekly Care Conference Note  [] Discharge    Constantin Obando      :1955  [facility]:[id number]  Rehab Dx/Hx: H/O Spinal surgery [Z98.890]    Precautions: falls  Home situation: lives with , dtr manages meds, shares finances with ,  does the cooking, cleaning, laundry, yardwork, not an active    ST Dx: [] Aphasia  [] Dysarthria  [] Apraxia   [x] Oropharyngeal dysphagia [x] Cognitive Impairment  [] Other:   Date of Admit: 2022  Room #: 0162/0162-01    Current functional status (updated daily):         Pt being seen for : [x] Speech/Language Treatment  [] Dysphagia Treatment [] Cognitive Treatment  [] Other:  Communication: [x]WFL  [] Aphasia  [] Dysarthria  [] Apraxia  [] Pragmatic Impairment [] Non-verbal  [] Hearing Loss  [] Other:   Cognition: [] WFL  [x] Mild  [] Moderate  [] Severe [] Unable to Assess  [] Other:  Memory: [] WFL  [x] Mild  [] Moderate  [] Severe [] Unable to Assess  [] Other:  Behavior: [x] Alert  [x] Cooperative  [x]  Pleasant  [] Confused  [] Agitated  [] Uncooperative  [] Distractible [] Motivated  [] Self-Limiting [] Anxious  [] Other:  Endurance:  [] Adequate for participation in SLP sessions  [x] Reduced overall  [x] Lethargic  [] Other:  Safety: [x] No concerns at this time  [] Reduced insight into deficits  []  Reduced safety awareness [] Not following call light procedures  [] Unable to Assess  [] Other:    Current Diet Order:ADULT DIET;  Regular  ADULT ORAL NUTRITION SUPPLEMENT; Breakfast, Dinner; Standard High Calorie/High Protein Oral Supplement  Swallowing Precautions: upright for all intake, stay upright for at least 30 mins after intake, small bites/sips, alternate bites/sips, slow rate of intake, general GERD precautions, and general aspiration precautions        Date: 2022      Tx session 1  9988-0149 Tx session 2  All tx needs met in session 1    Total Timed Code Min 15    Total Treatment Minutes 30    Individual Treatment Minutes 30    Group Treatment Minutes 0    Co-Treat Minutes 0    Variance/Reason:  0    Pain nausea    Pain Intervention [x] RN notified- administered meds  [] Repositioned  [] Intervention offered and patient declined  [] N/A  [] Other:    [] RN notified  [] Repositioned  [] Intervention offered and patient declined  [] N/A  [] Other:   Subjective     Pt seen sitting on edge of bed, reporting she just had an episode of emesis, RN notified, and gave meds. Pt cooperative and agreeable to participate. Objective:  Goals           Goal met 08/22/22. Pt tolerating IDDSI level 7 regular solids, thin liquids, meds whole with water 1 pill at a time as LRD. Goal met 08/22/22. Cog Goals:  Short-term Goals  Timeframe for Short-term *goals extended through 08/26/22*    Goal 1: Pt will complete graded recall tasks using compensatory strategies with 90% acc given min cues    Higher level recall/alternating attention card task:  -pt given two categories and asked to alternate between the categories going through letters of the alphabet  -pt also asked to recall what was previously named and not name the same item twice  -92% acc indep improving to 100% acc given min cues     Goal 2: Pt will complete executive function tasks (e.g. money, time, etc) with 90% acc given min cues      Did not target. Goal 3: Pt will complete higher level critical thinking tasks with 90% acc given min cues   Did not target.      Goal 4: Pt will complete respiratory retraining exercises to improve overall breath support with speech 10/10 given min cues     -exhale while sustaining the sound /sh/: x10  -exhale while sustaining the sound /f/: x10  -exhale while sustaining the sound /z/: x10  -produce a pulse of air using the /ha/ then sniff in through nose with mouth closed: x10  -diaphragmatic breathing: x20     Other areas targeted: N/A Education:   Edu provided re: memory/attention strategies, respiratory retraining exercises      Safety Devices: [x] Call light within reach  [] Chair alarm activated  [x] Bed alarm activated  [] Other: [] Call light within reach  [] Chair alarm activated  [] Bed alarm activated  [] Other:    Assessment: Pt pleasant and cooperative, agreeable to participate. Pt completed respiratory retraining exercises x10, occasional min cues required for deep breathing. Pt completed higher level recall/alternating attention task with 92% acc indep improving to 100% acc given min cues. Pt continues to make consistent progress towards all goals and remains motivated to improve. Continue goals above. Plan: Continue as per plan of care. Additional Information:     Barriers toward progress: Decreased endurance  Discharge recommendations:  [] Home independently  [] Home with assistance []  24 hour supervision  [] ECF [x] Other: See PT/OT notes   Continued Tx Upon Discharge: ? [] Yes [x] No [] TBD based on progress while on ARU [] Vital Stim indicated [] Other:   Estimated discharge date: 08/31/22    Interventions used this date:  [] Speech/Language Treatment  [] Instruction in HEP [] Group [] Dysphagia Treatment [x] Cognitive Treatment   [] Other: Total Time Breakdown / Charges    Time in Time out Total Time / units   Cognitive Tx 0915 0930 15 min/ 1 unit    Speech Tx 0900 0915 15 min/ 1 unit    Dysphagia Tx -- -- --       Electronically Signed by     Carolin Newberry. A CCC-SLP  Speech-Language Pathologist  [id number]

## 2022-08-29 NOTE — PROGRESS NOTES
Occupational Therapy  Facility/Department: [facility]  Rehabilitation Occupational Therapy Daily Treatment Note    Date: 22  Patient Name: Marcos Obando       Room: 7333/6348-59  MRN: [id number]  Account: [de-identified]   : 1955  (73 y.o.) Gender: female                    Past Medical History:  has a past medical history of A-fib ([facility] [address] Anxiety, Cryptogenic organizing pneumonia ([facility] [address] Depression, GERD (gastroesophageal reflux disease), Hyperlipidemia, On home oxygen therapy, Rash, and Thyroid disease. Past Surgical History:   has a past surgical history that includes Cholecystectomy; bronchoscopy (N/A, 2019); bronchoscopy (2019); bronchoscopy (2019); bronchoscopy (2019); bronchoscopy (07/10/2019); bronchoscopy (N/A, 7/10/2019); Hysterectomy, total abdominal; bronchoscopy (Bilateral, 2019); bronchoscopy (N/A, 2019); bronchoscopy (N/A, 2019); Arm Surgery (Left, 3/2/2020); and Colonoscopy (N/A, 2022). Restrictions  Restrictions/Precautions: General Precautions; Fall Risk;Up as Tolerated  Spinal Precautions: TLSO when OOB, can jamila EOB; no lifting >10 lbs  Other position/activity restrictions: 5-6L O2 (initially 5L at rest, increasing to 6L with activity), TLSO, spinal precautions, ok to shower. Subjective  Subjective: Pt sitting EOB upon arrival to room, agreeable to OT and requesting a shower. Pt reporting no pain this date. O2 did drop to 78% upon completion of shower but able to recover with 4L O2. Restrictions/Precautions: General Precautions; Fall Risk;Up as Tolerated             Objective         Perception  Overall Perceptual Status: WFL     ADL  Feeding  Assistance Level: Increased time to complete;Set-up; Supervision  Skilled Clinical Factors: drinking water, OT educated patient on taking medications that were on table however pt reports \"will take them later\"  Grooming/Oral Hygiene  Assistance Level: Set-up  Skilled Clinical Factors: Pt completed tasks seated at sink, pt brushed/blow dryed hair and brushed teeth without assistance  Upper Extremity Bathing  Assistance Level: Set-up; Increased time to complete;Supervision  Skilled Clinical Factors: Pt sat on TTB and was able to complete all UE bathing tasks with setup/SPV  Lower Extremity Bathing  Equipment Provided: Long-handled sponge  Assistance Level: Stand by assist;Set-up; Verbal cues  Skilled Clinical Factors: Pt was able to bathe front and upper/lower BLE without assistance only setup  Upper Extremity Dressing  Assistance Level: Minimal assistance; Increased time to complete  Skilled Clinical Factors: Valeria to jamila brace seated EOB, able to doff brace sitting on TTB; asisst with donning brace TTB and then doffing sitting EOB with increased time; able to jamila/doff shirt with increased time seated EOB vs on TTB  Lower Extremity Dressing  Assistance Level: Verbal cues; Set-up; Contact guard assist  Skilled Clinical Factors: pt was able to doff/jamila pants and underwear when seated on TTB; CGA only for balance when standing to manage up/down with grab bar support on L side  Putting On/Taking Off Footwear  Assistance Level: Set-up; Stand by assist;Verbal cues  Skilled Clinical Factors: pt was able to jamila/doff socks and shoes without assistance, setup only to bring to bathroom and place dirty clothing in bag  Toileting  Assistance Level: Contact guard assist;Verbal cues; Increased time to complete  Skilled Clinical Factors: CGA for standing balance with RW vs grab bar  Toilet Transfers  Technique:  (ambulating to/from bathroom with RW)  Equipment: Beside commode (over toilet)  Additional Factors: Increased time to complete;Cues for hand placement; With handrails; Set-up; Verbal cues  Assistance Level: Contact guard assist;Verbal cues; Increased time to complete  Skilled Clinical Factors: CGA with increased time and PRN cues, O2 line management  Tub/Shower Transfers  Type: Shower  Transfer From: [employer] walker  Transfer To: Tub transfer bench  Additional Factors: Cues for hand placement;Verbal cues; Set-up; Increased time to complete; With handrails  Assistance Level: Contact guard assist  Skilled Clinical Factors: CGA transfer from toilet to TTB walk in shower with RW          Functional Mobility  Device: Rolling walker  Activity: To/From bathroom  Assistance Level: Contact guard assist  Skilled Clinical Factors: CGA for all transfers and mobility in/out of bathroom with RW; O2 line management needed  Bed Mobility  Overall Assistance Level: Supervision  Additional Factors: With handrails; Increased time to complete;Verbal cues; Head of bed raised  Sit to Supine  Assistance Level: Supervision  Transfers  Surface: From bed; To bed; Wheelchair;Bedside commode (TTB)  Additional Factors: Set-up; Verbal cues; Hand placement cues; Increased time to complete; With handrails  Device: Walker  Sit to Stand  Assistance Level: Contact guard assist  Skilled Clinical Factors: Pt only needing steadying assistance and cues at times for hand placement this date but overall CGA  Stand to Sit  Assistance Level: Contact guard assist  Skilled Clinical Factors: Pt only needing steadying assistance and cues at times for hand placement this date but overall CGA  Bed To/From Chair  Technique: Stand step  Assistance Level: Contact guard assist  Skilled Clinical Factors: CGA EOB to BSC over toilet and then to TTB with RW   OT Exercises  Exercise Treatment: Pt sat in recliner to complete 2x10 medicine ball (4 pound): chest press, elbow flexion/extension, side to side; 1 pound free weight 1 UE at a time- supination/pronation, wrist flexion/extension, internal/external rotation, shoulder flexion with elbow extension, circles forwards/backwards; pt tolerated well with rest breaks     Assessment  Assessment  Assessment: Pt remains pleasant, motivated and agreeable to therapy.   Pt was able to complete functional mobility in room/bathroom with CGA only for balance and cues for safety/sequencing with RW and O2 management during mobility and ADL tasks. Pt does drop to 78% on 4L O2 and needing up to 6L O2 for recovery despite rest breaks and time. Pt was able to  complete UE bathing setup and able to complete LB dressing/bathing with increased time sitting setup, remains CGA standing for pants/brief managment. Pt continues to progress towards goals but is limited by fatigue and low O2 with activity. Cont to rec 24 hour SPV and HHOT. Cont OT POC. Activity Tolerance: Patient limited by fatigue;Patient tolerated treatment well;Patient limited by endurance  Discharge Recommendations: 24 hour supervision or assist;Home with assist PRN;Home with nursing aide;Home with Home health OT;S Level 3  OT Equipment Recommendations  Equipment Needed: Yes  Mobility Devices: ADL Assistive Devices  Walker: Rolling  Wheelchair: Standard; Wheelchair Cushion Standard  ADL Assistive Devices: Toileting - Standard Commode; Shower Chair with back;Grab Bars - toilet;Grab Bars - shower; Reacher;Sock-Aid Hard;Long-handled Sponge  Other: Patient would benefit from [facility] [place] due to being confined to room at night and to provide support with toileting/transfers. Without a BSC pt is at a higher risk of falls. Safety Devices  Safety Devices in place: Yes  Type of devices: All fall risk precautions in place; Bed alarm in place; Patient at risk for falls;Gait belt;Left in bed;Call light within reach  Restraints  Initially in place: No    Patient Education  Education  Education Given To: Patient  Education Provided: Role of Therapy; ADL Function;Plan of Care;Equipment;DME/Home Modifications;Transfer Training;Precautions; Safety; Energy Conservation; Fall Prevention Strategies  Education Provided Comments: spinal precautions, adaptive ADL methods, pursed lip breathing, energy conservation, rest breaks, safety with ADLs  Education Method: Verbal;Demonstration  Barriers to Learning: None  Education Outcome: Verbalized understanding;Demonstrated understanding;Continued education needed    Plan  Plan  Times per Week: 5 out of 7 days  Times per Day: Daily  Plan Weeks: 10 days  Current Treatment Recommendations: Strengthening;ROM;Balance training;Functional mobility training; Endurance training;Gait training;Neuromuscular re-education; Safety education & training;Pain management;Patient/Caregiver education & training;Equipment evaluation, education, & procurement; Modalities; Positioning;Self-Care / ADL; Home management training;Cognitive/Perceptual training;Coordination training    Goals  Patient Goals   Patient goals :  \"To go home and be out of pain\"  Short Term Goals  Time Frame for Short term goals: 5 days (8/21/22)  Short Term Goal 1: Pt will complete standing task x3 minutes SBA with LRAD-GOAL MET 8/23  Short Term Goal 2: Pt will complete toileting/transfer CGA with LRAD and DME PRN- EXTEND TO DC DATE -GOAL 8/29  Short Term Goal 3: Pt will complete LE dressing Valeria using AE and LRAD PRN-GOAL MET 8/22  Long Term Goals  Time Frame for Long term goals : 10 days (8/26/22) - d/c date extended to 8/31  Long Term Goal 1: Pt will complete functional mobility in room/bathroom Rachael with LRAD for ADL management  Long Term Goal 2: Pt will complete IADL task standing vs sitting Rachael with LRAD  Long Term Goal 3: Pt will complete UE/LE bathing/dressing including donning/doffing brace (patient vs family education teachback) setup vs Rachael sitting vs standing with AE vs LRAD; ongoing Mod A UB dressing, Mod A LB dressing 8/20  Long Term Goal 4: Pt will complete toileting/transfer with LRAD and DME PRN Rachael        Therapy Time   Individual Concurrent Group Co-treatment   Time In 1230         Time Out 1400         Minutes 90         Timed Code Treatment Minutes: [address] Sheryl Rockwell, OTR/L

## 2022-08-30 PROCEDURE — 6360000002 HC RX W HCPCS: Performed by: STUDENT IN AN ORGANIZED HEALTH CARE EDUCATION/TRAINING PROGRAM

## 2022-08-30 PROCEDURE — 6370000000 HC RX 637 (ALT 250 FOR IP): Performed by: INTERNAL MEDICINE

## 2022-08-30 PROCEDURE — 94761 N-INVAS EAR/PLS OXIMETRY MLT: CPT

## 2022-08-30 PROCEDURE — 97530 THERAPEUTIC ACTIVITIES: CPT

## 2022-08-30 PROCEDURE — 92507 TX SP LANG VOICE COMM INDIV: CPT

## 2022-08-30 PROCEDURE — 6370000000 HC RX 637 (ALT 250 FOR IP): Performed by: STUDENT IN AN ORGANIZED HEALTH CARE EDUCATION/TRAINING PROGRAM

## 2022-08-30 PROCEDURE — 1280000000 HC REHAB R&B

## 2022-08-30 PROCEDURE — 94640 AIRWAY INHALATION TREATMENT: CPT

## 2022-08-30 PROCEDURE — 2700000000 HC OXYGEN THERAPY PER DAY

## 2022-08-30 PROCEDURE — 97110 THERAPEUTIC EXERCISES: CPT

## 2022-08-30 PROCEDURE — 6370000000 HC RX 637 (ALT 250 FOR IP): Performed by: REGISTERED NURSE

## 2022-08-30 PROCEDURE — 97129 THER IVNTJ 1ST 15 MIN: CPT

## 2022-08-30 PROCEDURE — 97112 NEUROMUSCULAR REEDUCATION: CPT

## 2022-08-30 PROCEDURE — 97116 GAIT TRAINING THERAPY: CPT

## 2022-08-30 PROCEDURE — 97535 SELF CARE MNGMENT TRAINING: CPT

## 2022-08-30 RX ADMIN — ONDANSETRON 4 MG: 4 TABLET, ORALLY DISINTEGRATING ORAL at 16:18

## 2022-08-30 RX ADMIN — OXYCODONE AND ACETAMINOPHEN 2 TABLET: 5; 325 TABLET ORAL at 01:57

## 2022-08-30 RX ADMIN — NADOLOL 20 MG: 20 TABLET ORAL at 09:31

## 2022-08-30 RX ADMIN — NICOTINE POLACRILEX 20 MG: 4 GUM, CHEWING ORAL at 07:55

## 2022-08-30 RX ADMIN — OXYCODONE AND ACETAMINOPHEN 1 TABLET: 5; 325 TABLET ORAL at 12:41

## 2022-08-30 RX ADMIN — HYDROCORTISONE ACETATE 25 MG: 25 SUPPOSITORY RECTAL at 09:32

## 2022-08-30 RX ADMIN — HYDROCORTISONE ACETATE 25 MG: 25 SUPPOSITORY RECTAL at 21:26

## 2022-08-30 RX ADMIN — ATORVASTATIN CALCIUM 40 MG: 40 TABLET, FILM COATED ORAL at 09:32

## 2022-08-30 RX ADMIN — METHOCARBAMOL TABLETS 750 MG: 750 TABLET, COATED ORAL at 14:00

## 2022-08-30 RX ADMIN — HEPARIN SODIUM 5000 UNITS: 5000 INJECTION INTRAVENOUS; SUBCUTANEOUS at 21:27

## 2022-08-30 RX ADMIN — Medication 2 PUFF: at 08:41

## 2022-08-30 RX ADMIN — POTASSIUM CHLORIDE 10 MEQ: 750 TABLET, EXTENDED RELEASE ORAL at 09:32

## 2022-08-30 RX ADMIN — CALCIUM POLYCARBOPHIL 625 MG: 625 TABLET, FILM COATED ORAL at 21:26

## 2022-08-30 RX ADMIN — HEPARIN SODIUM 5000 UNITS: 5000 INJECTION INTRAVENOUS; SUBCUTANEOUS at 06:24

## 2022-08-30 RX ADMIN — CETIRIZINE HYDROCHLORIDE 10 MG: 10 TABLET, FILM COATED ORAL at 09:32

## 2022-08-30 RX ADMIN — PANTOPRAZOLE SODIUM 40 MG: 40 TABLET, DELAYED RELEASE ORAL at 06:24

## 2022-08-30 RX ADMIN — IPRATROPIUM BROMIDE AND ALBUTEROL SULFATE 1 AMPULE: 2.5; .5 SOLUTION RESPIRATORY (INHALATION) at 11:37

## 2022-08-30 RX ADMIN — POTASSIUM CHLORIDE 10 MEQ: 750 TABLET, EXTENDED RELEASE ORAL at 21:27

## 2022-08-30 RX ADMIN — MIRTAZAPINE 30 MG: 15 TABLET, FILM COATED ORAL at 21:26

## 2022-08-30 RX ADMIN — LEVOTHYROXINE SODIUM 125 MCG: 0.12 TABLET ORAL at 06:24

## 2022-08-30 RX ADMIN — SERTRALINE HYDROCHLORIDE 200 MG: 50 TABLET ORAL at 09:31

## 2022-08-30 RX ADMIN — PIRFENIDONE 801 MG: 801 TABLET, FILM COATED ORAL at 07:56

## 2022-08-30 RX ADMIN — IPRATROPIUM BROMIDE AND ALBUTEROL SULFATE 1 AMPULE: 2.5; .5 SOLUTION RESPIRATORY (INHALATION) at 20:11

## 2022-08-30 RX ADMIN — HEPARIN SODIUM 5000 UNITS: 5000 INJECTION INTRAVENOUS; SUBCUTANEOUS at 14:01

## 2022-08-30 RX ADMIN — PIRFENIDONE 801 MG: 801 TABLET, FILM COATED ORAL at 12:04

## 2022-08-30 RX ADMIN — Medication 400 MG: at 09:31

## 2022-08-30 RX ADMIN — OXYCODONE AND ACETAMINOPHEN 2 TABLET: 5; 325 TABLET ORAL at 18:42

## 2022-08-30 RX ADMIN — METHOCARBAMOL TABLETS 750 MG: 750 TABLET, COATED ORAL at 09:32

## 2022-08-30 RX ADMIN — METHOCARBAMOL TABLETS 750 MG: 750 TABLET, COATED ORAL at 21:26

## 2022-08-30 ASSESSMENT — PAIN DESCRIPTION - LOCATION
LOCATION: BACK

## 2022-08-30 ASSESSMENT — PAIN SCALES - GENERAL
PAINLEVEL_OUTOF10: 4
PAINLEVEL_OUTOF10: 5
PAINLEVEL_OUTOF10: 0
PAINLEVEL_OUTOF10: 7
PAINLEVEL_OUTOF10: 6
PAINLEVEL_OUTOF10: 0

## 2022-08-30 ASSESSMENT — PAIN DESCRIPTION - DESCRIPTORS
DESCRIPTORS: ACHING

## 2022-08-30 ASSESSMENT — PAIN SCALES - WONG BAKER: WONGBAKER_NUMERICALRESPONSE: 0

## 2022-08-30 ASSESSMENT — PAIN DESCRIPTION - ORIENTATION
ORIENTATION: LOWER;MID
ORIENTATION: LOWER
ORIENTATION: MID;LOWER
ORIENTATION: LOWER

## 2022-08-30 NOTE — PLAN OF CARE
Problem: Pain  Goal: Verbalizes/displays adequate comfort level or baseline comfort level  8/29/2022 2332 by Gonzales Ferreira RN  Outcome: Progressing  8/29/2022 2213 by Gonzales Ferreira, RN  Outcome: Progressing     Problem: Safety - Adult  Goal: Free from fall injury  8/29/2022 2332 by Gonzales Ferreira, RN  Outcome: Progressing  8/29/2022 2213 by Gonzales Ferreira, RN  Outcome: Progressing  8/29/2022 1246 by Ade Lopez RN  Outcome: Progressing     Problem: Nutrition Deficit:  Goal: Optimize nutritional status  8/29/2022 2332 by Gonzales Ferreira, RN  Outcome: Progressing  8/29/2022 2213 by Gonzales Ferreira RN  Outcome: Progressing     Problem: Chronic Conditions and Co-morbidities  Goal: Patient's chronic conditions and co-morbidity symptoms are monitored and maintained or improved  8/29/2022 2332 by Gonzales Ferreira, RN  Outcome: Progressing  8/29/2022 2213 by Gonzales Ferreira RN  Outcome: Progressing

## 2022-08-30 NOTE — PROGRESS NOTES
Physical Therapy  Facility/Department: [facility]  Rehabilitation Physical Therapy Treatment Note    NAME: Zandra Obando  : 1955 (73 y.o.)  MRN: [id number]  CODE STATUS: Full Code    Date of Service: 22     Restrictions:  Restrictions/Precautions: General Precautions; Fall Risk;Up as Tolerated  Position Activity Restriction  Spinal Precautions: No Bending; No Twisting; No Lifting  Spinal Precautions: TLSO when OOB, can jamila EOB; no lifting >10 lbs  Other position/activity restrictions: 5-6L O2 (initially 5L at rest, increasing to 6L with activity), TLSO, spinal precautions, ok to shower. SUBJECTIVE  Subjective  Subjective: pt states she is feeling good today, agreeable to PT treatment. Pain: Pt denies c/o pain,      Post Treatment Pain Screening:  pt denies pain at end of treatment. OBJECTIVE  Orientation  Overall Orientation Status: Within Normal Limits  Orientation Level: Oriented X4    Functional Mobility  Bed Mobility  Overall Assistance Level: Independent  Bridging  Skilled Clinical Factors: HOB flat, no hand rails. Roll Left  Assistance Level: Independent  Roll Right  Assistance Level: Independent  Sit to Supine  Assistance Level: Independent  Supine to Sit  Assistance Level: Independent  Scooting  Assistance Level: Independent  HOB flat without BR. Balance  Sitting Balance: Supervision  Standing Balance: SBA (with RW)  Standing Balance  Activity: Grossly SBA with RW  Transfers  Surface: From bed;Standard toilet;From chair with arms;From chair without arms (to car in gym.)  Additional Factors: Increased time to complete  Device: Walker (RW)  Sit to Stand  Assistance Level: SUP (with RW)  Skilled Clinical Factors: Pt completes multiple t/f throughout session with RW with grossly SUP, demonstrates good hand placement, requires increased time and RW.   Stand to Sit  Assistance Level: SUP  Skilled Clinical Factors: Pt completes multiple t/f throughout session with RW with grossly SUP, demonstrates good hand placement, requires increased time and RW. Bed To/From Chair  Technique: Stand pivot  Assistance Level: SUP  Skilled Clinical Factors: Pt completes multiple t/f throughout session with RW with grossly SUP, demonstrates good hand placement, requires increased time and RW. Stand Pivot  Assistance Level: Modified independent  Skilled Clinical Factors: using RW, EOB to WC. Car Transfer  Assistance Level: SUP  Skilled Clinical Factors: With RW, increased time to complete. Skilled Clinical Factors: decreased endurance, requires 6L O2 on NC and RW. Environmental Mobility  Ambulation  Surface: Level surface  Device: Rolling walker  Distance: 90', 25' including 10' over unevern surface, 20', 20'. Activity: Within Room; Within Unit;Retrieve Items  Activity Comments: Distances limited by SOB and fatigue, SpO2 during first  bout of ambulation dropped to 88% on 6L, remained over 91% for remainder of PT. Pt requires ~ 3-4 minutes seated rest break after each activity secondary to SOB and fatigue, but demonstrates good understanding of her lmitation and knows when she needs to rest.  Additional Factors: Increased time to complete  Assistance Level: Stand by assist  Gait Deviations: Slow mike;Decreased step length bilateral;Decreased weight shift bilateral;Decreased heel strike right;Decreased heel strike left; Unsteady gait; Wide base of support  Skilled Clinical Factors: pt occasioanly allows RW to get too far forward, but can self correct and bring RW closer to her, demonstrates decreased step length bilaterally, but states this was her gait pattern PTA. Stairs  Stair Height: 6''  Device: Bilateral handrails  Number of Stairs: 1.  pt ascended stair forward with B hand rails and CGA, and then descended stair backward with B hand rails and CGA, reports some pain in thigh/groin with this activity and feeling short of breath and fatigued.   Skilled Clinical Factors - Comments: decreased strength, endurance, and increased oxygen consumption requirements. Wheelchair  Surface: Level surface  Device: Standard wheelchair  Additional Factors: Increased time to complete  Assistance Level for Propulsion: Modified independent  Propulsion Method: Bilateral upper extremities  Propulsion Quality: Slow velocity; Short strokes  Propulsion Distance: 179'  Skilled Clinical Factors: pt demosntrates fatigue with activity and requires ~4 minute rest break after WC propulsion to recover from fatigue. PT Exercises  Static Standing Balance Exercises: pt retrieved bean bag from floor with reacher and RW SBA. ASSESSMENT/PROGRESS TOWARDS GOALS  Vital Signs  Heart Rate: 92  BP: 123/67  MAP (Calculated): 85.67  SpO2: 92 %  Comment: pt on 6L throughout PT, SPO2 sats remained above 91% throughout except after 90 feet of ambulation, when SPO2 kyle went down to 88%, but quickly recovered. Assessment  Assessment: pt demonstrates good progression toward goals on today's date, improved ambulation capacity walking up to 90 with RW and SBA, bed MOB I, transfers SUP with RW.  pt demosntrates notable fatigue and required prolonged seated rest breaks after all exercises and activites performed ontoday's date. pt states she is feeling much more confident now and feels like she can push herself in PT, and on today's date demosntrates further ambulation than ever demosntrated, 80' with RW and SBA, as welll as willingness to attempt ascending and descending a stair. pt continues to demosntrate decreased endurnace, strenght, functional capacity, and gait speed, and would continue to benefit from skilled PT to address the above stated deficits.   Activity Tolerance: Patient limited by fatigue;Patient tolerated treatment well;Patient limited by endurance  Discharge Recommendations: 24 hour supervision or assist;Home with Home health PT  PT Equipment Recommendations  Equipment Needed: Yes  Walker: Rolling  Other: Pt also has w/c at home    Goals  Patient Goals   Patient goals : Mp Santana be able to walk straight\"  Short Term Goals  Time Frame for Short term goals: 5 days- 8/20/22  Short term goal 1: Pt will complete bed mobility with SBA, with min cues for log roll technique -- 8/20: GOAL PARTIALLY MET SBA with bed rails and cues for log roll  8/30 GOAL MET. Short term goal 2: Pt will complete functional transfers with CGA consistently, using LRAD -- 8/23: GOAL PARTIALLY MET pt can transfer at [facility] [place], but after an hour of PT requires MIN A due to fatigue. 8/30 GOAL MET. Short term goal 3: Pt will ambulate 25ft with min A using RW -- 8/23 GOAL MET  Long Term Goals  Time Frame for Long term goals : 10 days- 8/26/22-- goals extended to 8/31 based on updated d/c date  Long term goal 1: Pt will complete bed mobility with mod I without cues for log roll technique 8/30 Goal Met  Long term goal 2: Pt will complete functional transfers with mod I using LRAD 8/30 Goal Met  Long term goal 3: Pt will ambulate 50ft with supervision using LRAD  Long term goal 4: Pt will complete car transfer with supervision using LRAD 8/30 Goal Met  Long term goal 5: Pt will verbalize 3/3 spinal precautions without cues and adhere to precautions with mobility without cues. 8/30 Goal not met, pt states \"I cant bend over and have to wear mybrace\" when asked. PLAN OF CARE/SAFETY  Plan  Plan: 5-7 times per week  Plan weeks: 10 days  Specific Instructions for Next Treatment: Progress mobility as tolerated  Current Treatment Recommendations: Strengthening;Balance training;Functional mobility training;Transfer training; Endurance training; Wheelchair mobility training;Gait training;Stair training;Equipment evaluation, education, & procurement; Neuromuscular re-education;Home exercise program;Safety education & training;Patient/Caregiver education & training;Positioning  Safety Devices  Type of Devices: All fall risk precautions in place;Call light within reach; Patient at risk for falls;Nurse notified; Bed alarm in place;Gait belt;Left in bed (pt left with nursing staff.)    EDUCATION  Education  Education Given To: Patient  Education Provided: Role of Therapy;Cognition;Plan of Care;Family Education;Mobility Training;Equipment;Precautions;Transfer Training; Safety; Energy Conservation; Fall Prevention Strategies;DME/Home Modifications  Education Provided Comments: Educated on importance of OOB mobility, spinal px and use of TLSO, functional mobility and positional changes with back pain.   Education Method: Demonstration;Verbal  Barriers to Learning: None  Education Outcome: Verbalized understanding;Demonstrated understanding;Continued education needed    Therapy Time   Individual Concurrent Group Co-treatment   Time In 0830         Time Out 0930         Minutes 60           Timed Code Treatment Minutes: Gagan [address], PT, 08/30/22 at 1:21 PM

## 2022-08-30 NOTE — PROGRESS NOTES
Occupational Therapy  Facility/Department: [facility]  Rehabilitation Occupational Therapy Daily Treatment Note    Date: 22  Patient Name: Godwin Banks       Room: 7378/8255-21  MRN: [id number]  Account: [de-identified]   : 1955  (73 y.o.) Gender: female                    Past Medical History:  has a past medical history of A-fib ([facility] [address] Anxiety, Cryptogenic organizing pneumonia ([facility] [address] Depression, GERD (gastroesophageal reflux disease), Hyperlipidemia, On home oxygen therapy, Rash, and Thyroid disease. Past Surgical History:   has a past surgical history that includes Cholecystectomy; bronchoscopy (N/A, 2019); bronchoscopy (2019); bronchoscopy (2019); bronchoscopy (2019); bronchoscopy (07/10/2019); bronchoscopy (N/A, 7/10/2019); Hysterectomy, total abdominal; bronchoscopy (Bilateral, 2019); bronchoscopy (N/A, 2019); bronchoscopy (N/A, 2019); Arm Surgery (Left, 3/2/2020); and Colonoscopy (N/A, 2022). Restrictions  Restrictions/Precautions: General Precautions; Fall Risk;Up as Tolerated  Spinal Precautions: TLSO when OOB, can jamila EOB; no lifting >10 lbs  Other position/activity restrictions: 4-6L O2 (initially 4L at rest, increasing to 6L with activity), TLSO, spinal precautions, ok to shower. Subjective  Subjective: Pt seated EOB, cross-stitching; agreeable to OT. Requesting to change clothing and use bathroom. Pt reports 4/10 pain. Vitals WFL; does drop to 80's with activity but able to recover with rest and up to 6 L O2  Restrictions/Precautions: General Precautions; Fall Risk;Up as Tolerated             Objective     Cognition  Overall Cognitive Status: Exceptions  Arousal/Alertness: Appropriate responses to stimuli  Following Commands: Follows all commands without difficulty; Follows multistep commands with repitition  Attention Span: Attends with cues to redirect  Memory: Appears intact  Safety Judgement: Decreased awareness of need for safety  Problem Solving: Assistance required to identify errors made;Assistance required to correct errors made;Assistance required to implement solutions;Assistance required to generate solutions  Insights: Decreased awareness of deficits  Initiation: Requires cues for some  Sequencing: Requires cues for some  Orientation  Overall Orientation Status: Within Normal Limits  Orientation Level: Oriented X4   Perception  Overall Perceptual Status: WFL  Unilateral Attention: Appears intact  Initiation: Cues to initiate tasks  Motor Planning: Appears intact  Perseveration: Not present     ADL  Feeding  Assistance Level: Increased time to complete;Set-up; Supervision  Skilled Clinical Factors: drinking water and taking medications sitting in recliner  Grooming/Oral Hygiene  Assistance Level: Contact guard assist;Verbal cues; Set-up; Increased time to complete  Skilled Clinical Factors: CGA standing due to fatigue, sitting however to complete tasks-- assist with completion of brushing hair  Lower Extremity Bathing  Assistance Level: Stand by assist;Set-up; Verbal cues  Skilled Clinical Factors: bath wipes sitting on BSC to bathe front and upper BLE  Upper Extremity Dressing  Assistance Level: Stand by assist;Contact guard assist;Set-up; Verbal cues  Skilled Clinical Factors: CGA to gather clothing and bring to bathroom, able to jamila/doff brace without assistance sitting  Lower Extremity Dressing  Assistance Level: Increased time to complete;Contact guard assist;Verbal cues;Stand by assist  Skilled Clinical Factors: CGA to gather clothing, able to manage pants up/down standing with SBA, able to thread/dethread pants/brief sitting on toilet  Putting On/Taking Off Footwear  Assistance Level: Set-up; Stand by assist;Verbal cues  Skilled Clinical Factors: pt was able to jamila/doff shoes without assistance  Toileting  Assistance Level: Contact guard assist;Verbal cues; Increased time to complete  Skilled Clinical Factors: CGA for standing balance with RW vs grab bar  Toilet Transfers  Technique: Stand step; To the left; To the right (RW)  Equipment: Beside commode (over toilet)  Additional Factors: Increased time to complete;Cues for hand placement; With handrails; Set-up; Verbal cues  Assistance Level: Contact guard assist;Verbal cues; Increased time to complete  Skilled Clinical Factors: CGA with increased time and PRN cues, O2 line management    Instrumental ADL's  Instrumental ADLs: Yes  Light Housekeeping  Light Housekeeping Level: Teresa Solo Housekeeping Level of Assistance: Contact guard assistance  Light Housekeeping: CGA with cues for manuevering RW in room around obstacles to gather clothing out of closet, sitting EOB to place in bag; able to transport clothing to/from bathroom on RW with education     Functional Mobility  Device: Rolling walker  Activity: Retrieve items;Transport items; To/From bathroom  Assistance Level: Contact guard assist;Stand by assist  Skilled Clinical Factors: CGA for all transfers and mobility in/out of bathroom with RW progressing at times with close supervision; O2 line management needed  Bed Mobility  Overall Assistance Level: Supervision  Additional Factors: With handrails; Increased time to complete;Verbal cues; Head of bed raised  Scooting  Assistance Level: Stand by assist  Transfers  Surface: From bed; To bed; Wheelchair;Bedside commode;From chair with arms; To chair with arms  Additional Factors: Set-up; Verbal cues; Hand placement cues; Increased time to complete; With handrails  Device: Walker  Sit to Stand  Assistance Level: Contact guard assist;Stand by assist  Skilled Clinical Factors: Pt was mostly CGA for all transfers from various surfaces; at times can be SBA depending on fatigue and pain level  Stand to Sit  Assistance Level: Stand by assist;Contact guard assist  Skilled Clinical Factors: Pt was mostly CGA for all transfers from various surfaces; at times can be SBA depending on fatigue and pain level  Bed To/From Chair  Technique: Stand step  Assistance Level: Contact guard assist  Skilled Clinical Factors: CGA EOB to [facility] [place] toilet and then to [address] with RW   OT Exercises  Exercise Treatment: Pt completed seated UE ex with 1# free weight 1 UE at a time 3x10- supination/pronation, wrist flexion/extension, internal/external rotation, shoulder flexion with elbow extension, circles forwards/backwards; pt tolerated well with rest breaks     Assessment  Assessment  Assessment: Pt remains pleasant, motivated and agreeable to therapy session. Pt was able to gather clothing this date and bring to bathroom, CGA-SBA with min cues for manevuering in bathroom/room. Pt educated on energy conservation and safety. Pt was able to tolerate BUE ex but does need consistent rest breaks during shorter bouts of mobility in hallway to gather cones, able to ambulate ~20 ft twice and then sit to rest between. Continues to progress towards goals. Cont OT POC. Activity Tolerance: Patient limited by fatigue;Patient tolerated treatment well;Patient limited by endurance  Discharge Recommendations: 24 hour supervision or assist;Home with assist PRN;Home with nursing aide;Home with Home health OT;S Level 3  OT Equipment Recommendations  Equipment Needed: Yes  Mobility Devices: ADL Assistive Devices; Vicente Nailer: Rolling  ADL Assistive Devices: Toileting - Standard Commode; Shower Chair with back;Grab Bars - toilet;Grab Bars - shower; Reacher;Sock-Aid Hard;Long-handled Sponge  Other: Patient would benefit from [facility] due to being confined to room at night and to provide support with toileting/transfers. Without a BSC pt is at a higher risk of falls. Safety Devices  Safety Devices in place: Yes  Type of devices: All fall risk precautions in place; Bed alarm in place; Patient at risk for falls;Gait belt;Left in bed;Call light within reach  Restraints  Initially in place: No    Patient Education  Education  Education Given To: Patient  Education Provided: Role of Therapy; ADL Function;Plan of Care;Equipment;DME/Home Modifications;Transfer Training;Precautions; Safety; Energy Conservation; Fall Prevention Strategies  Education Provided Comments: spinal precautions, adaptive ADL methods, pursed lip breathing, energy conservation, rest breaks, safety with ADLs  Education Method: Verbal;Demonstration  Barriers to Learning: None  Education Outcome: Verbalized understanding;Demonstrated understanding;Continued education needed    Plan  Plan  Times per Week: 5 out of 7 days  Times per Day: Daily  Plan Weeks: 10 days  Current Treatment Recommendations: Strengthening;ROM;Balance training;Functional mobility training; Endurance training;Gait training;Neuromuscular re-education; Safety education & training;Pain management;Patient/Caregiver education & training;Equipment evaluation, education, & procurement; Modalities; Positioning;Self-Care / ADL; Home management training;Cognitive/Perceptual training;Coordination training    Goals  Patient Goals   Patient goals :  \"To go home and be out of pain\"  Short Term Goals  Time Frame for Short term goals: 5 days (8/21/22)  Short Term Goal 1: Pt will complete standing task x3 minutes SBA with LRAD-GOAL MET 8/23  Short Term Goal 2: Pt will complete toileting/transfer CGA with LRAD and DME PRN- EXTEND TO DC DATE -GOAL 8/29  Short Term Goal 3: Pt will complete LE dressing Valeria using AE and LRAD PRN-GOAL MET 8/22  Long Term Goals  Time Frame for Long term goals : 10 days (8/26/22) - d/c date extended to 8/31  Long Term Goal 1: Pt will complete functional mobility in room/bathroom Rachael with LRAD for ADL management  Long Term Goal 2: Pt will complete IADL task standing vs sitting Rachael with LRAD  Long Term Goal 3: Pt will complete UE/LE bathing/dressing including donning/doffing brace (patient vs family education teachback) setup vs Rachael sitting vs standing with AE vs LRAD; ongoing Mod A UB dressing, Mod A LB dressing 8/20  Long Term Goal 4: Pt will complete toileting/transfer with LRAD and DME PRN Rachael        Therapy Time   Individual Concurrent Group Co-treatment   Time In 1230         Time Out 1400         Minutes 90         Timed Code Treatment Minutes: [address], OTR/L

## 2022-08-30 NOTE — PROGRESS NOTES
Paulino Obando  8/30/2022  [id number]    Chief Complaint: H/O Spinal surgery    Subjective:   No acute events overnight. Today Timmy Burrell is seen in her room. She reports that she is feeling improved. She noted hip pain has improved. She is apprehensive about discharge home tomorrow because she does not feel that she can take care of herself at this time. She would like to stay until Friday. Discussed with the team and patient now plans to discharge home on Friday. ROS: denies f/c, n/v, cp, sob    Objective:  Patient Vitals for the past 24 hrs:   BP Temp Temp src Pulse Resp SpO2 Weight   08/30/22 1329 133/77 -- -- 88 -- 92 % --   08/30/22 0837 123/67 -- -- 92 -- 92 % --   08/30/22 0715 (!) 105/55 97.6 °F (36.4 °C) Oral 94 24 94 % 126 lb (57.2 kg)   08/30/22 0227 -- -- -- -- 20 -- --   08/29/22 2019 -- -- -- -- 20 -- --   08/29/22 1945 115/74 97.6 °F (36.4 °C) Oral 94 20 94 % --   08/29/22 1911 -- -- -- -- -- 92 % --     Gen: No distress, pleasant. Supine in bed  HEENT: Normocephalic, atraumatic. CV: extremities well perfused  Resp: No respiratory distress. On oxygen via nasal cannula  Abd: Soft, nondistended  Ext: No edema. Neuro: Alert, oriented, appropriately interactive.  Speech fluent without aphasia  Psych: appropriate mood and affect    Wt Readings from Last 3 Encounters:   08/30/22 126 lb (57.2 kg)   08/08/22 132 lb (59.9 kg)   08/06/22 132 lb (59.9 kg)       Laboratory data:   Lab Results   Component Value Date    WBC 5.8 08/29/2022    HGB 8.4 (L) 08/29/2022    HCT 26.2 (L) 08/29/2022    MCV 86.4 08/29/2022     08/29/2022       Lab Results   Component Value Date/Time     08/29/2022 07:01 AM    K 4.3 08/29/2022 07:01 AM     08/29/2022 07:01 AM    CO2 33 08/29/2022 07:01 AM    BUN 7 08/29/2022 07:01 AM    CREATININE <0.5 08/29/2022 07:01 AM    GLUCOSE 93 08/29/2022 07:01 AM    CALCIUM 8.1 08/29/2022 07:01 AM        Therapy progress:  PT  Position Activity Restriction  Spinal Precautions: No Bending, No Twisting, No Lifting  Spinal Precautions: TLSO when OOB, can jamila EOB; no lifting >10 lbs  Other position/activity restrictions: 4-6L O2 (initially 4L at rest, increasing to 6L with activity), TLSO, spinal precautions, ok to shower. Objective     Sit to Stand: Minimal Assistance (up to RW with increased time)  Stand to sit: Minimal Assistance  Bed to Chair: Minimal assistance, Moderate assistance (using RW via stand pivot transfer)     OT  PT Equipment Recommendations  Equipment Needed: Yes  Mobility Devices: Wilson Gayer: Rolling  Other: Pt also has w/c at home  Toilet - Technique: Stand step, To left, To right (grab bar vs RW)  Equipment Used: Standard toilet  Toilet Transfers Comments: assist with lifting and lowering, would benefit from [facility] over toilet with RW vs grab bar for support  Assessment        SLP   Pt pleasant and cooperative, agreeable to participate. Pt completed repeat SLUMS scoring a 28/30 today compared to initial evaluation score of 26/30. Pt completed five step written sequencing task with 90% acc indep improving to 100% acc given min cues. Pt does demonstrate min difficulty with higher level executive functioning tasks (but has assist from  and dtr at home with meds/finances). Pt is making consistent progress towards goals and remains motivated to improve. Continue goals above. Body mass index is 23.05 kg/m². Assessment and Plan: Christina Ng is a 69year old female with a past medical history significant for chronic respiratory failure on 3 L home O2, ILD, COPD, paroxysmal atrial fibrillation, DM2, HTN, HLD, and thyroid disease who presented to  on 8/11/22 for planned T11-L3 PDFF with L1 corpectomy for L1 burst fracture. She was admitted to [facility] on 8/16/22 due to functional deficits below her baseline.      L1 Burst Fracture  - s/p T11-L3 PDFF with L1 corpectomy  - TLSO when OOB  - pain control  - staples removed 8/24  - incision open to air  - PT, OT     Chronic Respiratory Failure  ILD/COPD  - on 3 L home O2 at baseline  - now with increased oxygen requirement  - XR chest negative for acute process  - pifenidone TID, has not been regularly taking  - scheduled duo-nebs  - Pulm consulted, appreciate assistance. No acute process   - 3 L at rest and ok to be up to 6 L with activity  - humidified air     Large Bleeding hemorrhoids  - colonoscopy 8/25  - hydrocortisone suppositories per GI  - outpatient follow up    Colon polyp  - removed on colonoscopy 8/25  - follow up with GI OP    Hypokalemia  - resume daily supplement  - continue to monitor intermittently      pAF  [facility]  - monitor     DM2  - on januvia and jardiance at home, family brought in home meds  - glucose has been low due to poor PO intake, hold off on resuming home meds     Hypothyroidism  - synthroid     GERD  - PPI  - PRN omeprazole per home regimen     Anxiety/Depression  - zoloft, mirtazipine     HLD  - statin      Bowels: Schedule stool softener. Follow bowel movements. Enema or suppository if needed. Bladder: Check PVR x 3. [address] if PVR > 200ml or if any volume is > 500 ml. Sleep: Trazodone provided prn. Follow up appointments: Ortho Spine, PCP, Pulm    Services: home health PT, OT, nursing  EDOD: 9/2/22    Pino Green.  Wilber Traore MD 8/30/2022, 2:24 PM

## 2022-08-30 NOTE — PLAN OF CARE
Problem: Pain  Goal: Verbalizes/displays adequate comfort level or baseline comfort level  Pt a/o, rates pain appropriately using 0-10 pain scale; pt calls out as needed for pain intervention; will continue to monitor and administer intervention as ordered and requested.   Outcome: Progressing       Problem: Safety - Adult  Goal: Free from fall injury  Outcome: Progressing       Problem: Nutrition Deficit:  Goal: Optimize nutritional status  Outcome: Progressing

## 2022-08-30 NOTE — PATIENT CARE CONFERENCE
[facility]  Inpatient Rehabilitation  Weekly Team Conference Note    Date: 2022  Patient Name: Omid Shaffer        MRN: [id number]    : 1955  (69 y.o.)  Gender: female   Referring Practitioner: Desean Leo MD  Diagnosis: L1 burst fx s/p T11-L3 PDFF with L1 corpectomy,       Interventions to be utilized toward barriers to discharge, per discipline:  [address] observed barriers to dc: Pain and Decreased endurance  Nursing interventions: encourage oob and up to chair and walking to bathroom, Pain control, ween oxygen to baseline  Family Education:   Fall Risk:  Yes      PHYSICAL THERAPY  Physical therapy observed barriers to dc:           Baseline: Lives with  one level house, ramped entrance, MI functional mobility and gait 4WW household distances              Current level: I bed mobility, S t/f, SBA ambulation up to 90' with RW, CGA 1 step BHR              Barriers to DC: decreased strength, balance, activity tolerance, TLSO/spinal px, decreased SpO2 with activity, high O2 requirements              Needs in order to achieve dc home/next level of care: Needs to be SBA to MI with functional mobility/gait to d/c home, recommend 24hrA, HHPT, needs RW      Physical therapy interventions:   Current Treatment Recommendations: Strengthening, Balance training, Functional mobility training, Transfer training, Endurance training, Wheelchair mobility training, Gait training, Stair training, Equipment evaluation, education, & procurement, Neuromuscular re-education, Home exercise program, Safety education & training, Patient/Caregiver education & training, Positioning    PT Goals:            Short Term Goals  Time Frame for Short term goals: 5 days- 22  Short term goal 1: Pt will complete bed mobility with SBA, with min cues for log roll technique -- : GOAL PARTIALLY MET SBA with bed rails and cues for log roll   GOAL MET.   Short term goal 2: Pt will complete functional transfers with CGA consistently, using LRAD -- 8/23: GOAL PARTIALLY MET pt can transfer at [facility], but after an hour of PT requires MIN A due to fatigue. 8/30 GOAL MET. Short term goal 3: Pt will ambulate 25ft with min A using RW -- 8/23 GOAL MET            Long Term Goals  Time Frame for Long term goals : 10 days- 8/26/22-- goals extended to 8/31 based on updated d/c date  Long term goal 1: Pt will complete bed mobility with mod I without cues for log roll technique 8/30 Goal Met  Long term goal 2: Pt will complete functional transfers with mod I using LRAD 8/30 Goal Met  Long term goal 3: Pt will ambulate 50ft with supervision using LRAD  Long term goal 4: Pt will complete car transfer with supervision using LRAD 8/30 Goal Met  Long term goal 5: Pt will verbalize 3/3 spinal precautions without cues and adhere to precautions with mobility without cues. 8/30 Goal not met, pt states \"I cant bend over and have to wear mybrace\" when asked. PT Assessment:  Assessment: pt demonstrates good progression toward goals on today's date, improved ambulation capacity walking up to 90 with RW and SBA, bed MOB I, transfers SUP with RW.  pt demosntrates notable fatigue and required prolonged seated rest breaks after all exercises and activites performed ontoday's date. pt states she is feeling much more confident now and feels like she can push herself in PT, and on today's date demosntrates further ambulation than ever demosntrated, 80' with RW and SBA, as welll as willingness to attempt ascending and descending a stair. pt continues to demosntrate decreased endurnace, strenght, functional capacity, and gait speed, and would continue to benefit from skilled PT to address the above stated deficits.     Recommendation:   PT Equipment Recommendations  Equipment Needed: Yes  Mobility Devices: Sharlet Ganser: Rolling  Other: Pt also has w/c at home             OCCUPATIONAL THERAPY  Occupational therapy observed barriers to dc:    Baseline: independent with ADLs, IADLs,  assist PRn              Current level: SPV for sit <> stands, up to CGA for mobility,  SPV or LE dressing, setup/SPV with cues for brace management, increased need for rest breaks at times              Barriers to DC: endurance, pain, anxiety, limited standing tolerance, limited progress towards goals due to nausea, fatigue, and pain              Needs in order to achieve dc home/next level of care: will need assistance for home if patient remains at this level, 24 hour with HHOT S Level 3 and aide; DME: Shower chair, BSC, RW    Occupational Therapy interventions:  Current Treatment Recommendations: Strengthening, ROM, Balance training, Functional mobility training, Endurance training, Gait training, Neuromuscular re-education, Safety education & training, Pain management, Patient/Caregiver education & training, Equipment evaluation, education, & procurement, Modalities, Positioning, Self-Care / ADL, Home management training, Cognitive/Perceptual training, Coordination training    OT Goals:  Patient Goals   Patient goals :  \"To go home and be out of pain\"  Short Term Goals  Time Frame for Short term goals: 5 days (8/21/22)  Short Term Goal 1: Pt will complete standing task x3 minutes SBA with LRAD-GOAL MET 8/23  Short Term Goal 2: Pt will complete toileting/transfer CGA with LRAD and DME PRN- EXTEND TO DC DATE -GOAL 8/29  Short Term Goal 3: Pt will complete LE dressing Valeria using AE and LRAD PRN-GOAL MET 8/22  Long Term Goals  Time Frame for Long term goals : 10 days (8/26/22) - d/c date extended to 8/31  Long Term Goal 1: Pt will complete functional mobility in room/bathroom Rachael with LRAD for ADL management  Long Term Goal 2: Pt will complete IADL task standing vs sitting Rachael with LRAD  Long Term Goal 3: Pt will complete UE/LE bathing/dressing including donning/doffing brace (patient vs family education teachback) setup vs Rachael sitting vs standing with AE vs LRAD; ongoing Mod A UB dressing, Mod A LB dressing 8/20  Long Term Goal 4: Pt will complete toileting/transfer with LRAD and DME PRN Rachael    OT Assessment:  Assessment  Assessment: Pt remains pleasant, motivated and agreeable to therapy session. Pt was able to gather clothing this date and bring to bathroom, CGA-SBA with min cues for manevuering in bathroom/room. Pt educated on energy conservation and safety. Pt was able to tolerate BUE ex but does need consistent rest breaks during shorter bouts of mobility in hallway to gather cones, able to ambulate ~20 ft twice and then sit to rest between. Continues to progress towards goals. Cont OT POC. Activity Tolerance: Patient limited by fatigue;Patient tolerated treatment well;Patient limited by endurance  Discharge Recommendations: 24 hour supervision or assist;Home with assist PRN;Home with nursing aide;Home with Home health OT;S Level 3  OT Equipment Recommendations  Equipment Needed: Yes  Mobility Devices: ADL Assistive Devices; Easton Hopkins: Rolling  ADL Assistive Devices: Toileting - Standard Commode; Shower Chair with back;Grab Bars - toilet;Grab Bars - shower; Reacher;Sock-Aid Hard;Long-handled Sponge  Other: Patient would benefit from [facility] at AL due to being confined to room at night and to provide support with toileting/transfers. Without a BSC pt is at a higher risk of falls. Safety Devices  Safety Devices in place: Yes  Type of devices: All fall risk precautions in place; Bed alarm in place; Patient at risk for falls;Gait belt;Left in bed;Call light within reach  Restraints  Initially in place: No      SPEECH THERAPY:  Speech therapy observed barriers to dc:               Baseline: pt lives with , dtr manages meds, shares finances with ,  does the cooking, cleaning, laundry, yard work. Pt not an active .                Current level: mild high level cog, reduced breath support               Barriers to DC: endurance              Needs in order to achieve dc home/next level of care: carryover of compensatory strategies, improved breath support      Speech Therapy interventions:  Dysphagia: N/A- all dysphagia goals met on 08/22/22  Speech/Language/Cognition: Compensatory strategy training and carryover, recall/STM, problem solving, reasoning, exec function, thought organization, attention, respiratory retraining exercises      Dysphagia Goals:  Timeframe for Long-term Goals: 10 days; 08/26/2022  Goal 1: The patient will tolerate least restrictive diet with no clinical s/s of aspiration or worsening respiratory/pulmonary status. 08/22 goal met   Short-term Goals  Timeframe for Short-term Goals: 5 days; 0821/2022  Goal 1: The patient will tolerate recommended diet with no clinical s/s of aspiration 5/5. 08/22 goal met   Goal 2: The patient/caregiver will demonstrate understanding of compensatory swallow strategies, for improved swallow safety. 08/22 goal met        Speech/Language/Cog Goals:  Timeframe for Long-term Goals:  Goals extended through 09/03/22  Goal 1: Pt will improve overall cognitive-linguistic abilities to promote safe and independent return home PLOF - 08/30 - ongoing, progressing         Short-term Goals  Timeframe for Short-term Goals:Goals extended through 09/03/22  Goal 1: Pt will complete graded recall tasks using compensatory strategies with 90% acc given min cues - 08/30 - ongoing, progressing   Goal 2: Pt will complete executive function tasks (e.g. money, time, etc) with 90% acc given min cues- 08/30 - ongoing, progressing   Goal 3: Pt will complete higher level critical thinking tasks with 90% acc given min cues - 08/30 - ongoing, progressing   Goal 4: Pt will complete respiratory retraining exercises to improve overall breath support with speech 10/10 given min cues - 08/30 - ongoing, progressing     ST Assessment:  Pt alert and cooperative, agreeable to tx.  SLP provided pt with the respiratory retraining exercises handout; reviewed 1 by 1 to ensure pt understood so she could continue to complete indep at d/c. Pt verbalized and demonstrated understanding of each excise. Pt then completed a few of the exercises, 10 reps. Pt did well completing a deduction puzzle using logic, reasoning, and problem solving abilities. Pt benefited from min cues to increase acc. Continue goals above. NUTRITION  Weight: 126 lb (57.2 kg) / Body mass index is 23.05 kg/m². Diet Order: ADULT DIET; Regular  ADULT ORAL NUTRITION SUPPLEMENT; Breakfast, Dinner; Standard High Calorie/High Protein Oral Supplement  PO Meals Eaten (%): 51 - 75%  Education: No recommendation at this time       CASE MANAGEMENT  Assessment: 69 yr old female. Dx H/O Spinal surgery. Pulmology following. Independent prior level of function. Lives with spouse in a one level home with ramp entrance. Patient has rollator, manual w/c and home oxygen at home. Therapy recommendations 24 hour supervision or assist;Home with assist PRN;Home with nursing aide;Home with Home health PT/OT;S Level 3. Alternate Solution following for home services. Active with [facility] for home O2. Spouse will need to bring O2 tank from home for discharge. DME needed on DC shower chair with back, rolling walker and bedside commode. Interdisciplinary Goals:   1.) Pt will complete respiratory retraining exercises for improved breath support across all disciplines indep  2.) Pt will complete toileting/transfer with LRAD and DME PRN Rachael  3.) Pt will ambulate >80' with RW with S without LOB        [x]  Family Training discussed at conference and to be scheduled. Discharge Plan   Estimated discharge date: 9/2/2022  Destination: home health  Pass:No  Services at Discharge: [id number], Occupational Therapy, and Nursing   Equipment at Discharge: Shower chair with back, BSC, RW.     Team Members Present at Conference:  : Homero Escobar RN    Occupational Therapist: Elmer Cornejo, OTR/L  Physical Therapist: Xena Gallo, PT, DPT  Speech Therapist: Marton Homans, [place] CCC-SLP  Nurse: Carrie Sadler RN  Dietician: Loyd Santana RDN, LD  : Ty Haile, OTR/L  Psychiatry: N/A    Family members present at conference: No      I led this team conference and I approve the established interdisciplinary plan of care as documented within the medical record of Mariann Obando.     MD: Electronically signed by Chantal Jones MD on 8/31/2022 at 6:15 PM

## 2022-08-30 NOTE — PROGRESS NOTES
Speech Language Pathology  [facility]: ACUTE REHAB UNIT  SPEECH-LANGUAGE PATHOLOGY      [x] Daily  [] Weekly Care Conference Note  [] Discharge    Bunny Obando      :1955  EWT:[id number]  Rehab Dx/Hx: H/O Spinal surgery [Z98.890]    Precautions: falls  Home situation: lives with , dtr manages meds, shares finances with ,  does the cooking, cleaning, laundry, yardwork, not an active    ST Dx: [] Aphasia  [] Dysarthria  [] Apraxia   [x] Oropharyngeal dysphagia [x] Cognitive Impairment  [] Other:   Date of Admit: 2022  Room #: 0162/0162-01    Current functional status (updated daily):         Pt being seen for : [x] Speech/Language Treatment  [] Dysphagia Treatment [] Cognitive Treatment  [] Other:  Communication: [x]WFL  [] Aphasia  [] Dysarthria  [] Apraxia  [] Pragmatic Impairment [] Non-verbal  [] Hearing Loss  [] Other:   Cognition: [] WFL  [x] Mild  [] Moderate  [] Severe [] Unable to Assess  [] Other:  Memory: [] WFL  [x] Mild  [] Moderate  [] Severe [] Unable to Assess  [] Other:  Behavior: [x] Alert  [x] Cooperative  [x]  Pleasant  [] Confused  [] Agitated  [] Uncooperative  [] Distractible [] Motivated  [] Self-Limiting [] Anxious  [] Other:  Endurance:  [] Adequate for participation in SLP sessions  [x] Reduced overall  [x] Lethargic  [] Other:  Safety: [x] No concerns at this time  [] Reduced insight into deficits  []  Reduced safety awareness [] Not following call light procedures  [] Unable to Assess  [] Other:    Current Diet Order:ADULT DIET;  Regular  ADULT ORAL NUTRITION SUPPLEMENT; Breakfast, Dinner; Standard High Calorie/High Protein Oral Supplement  Swallowing Precautions: upright for all intake, stay upright for at least 30 mins after intake, small bites/sips, alternate bites/sips, slow rate of intake, general GERD precautions, and general aspiration precautions        Date: 2022      Tx session 1  1000 - 1030 Tx session 2  All tx needs met in session 1    Total Timed Code Min 15    Total Treatment Minutes 30    Individual Treatment Minutes 30    Group Treatment Minutes 0    Co-Treat Minutes 0    Variance/Reason:  0    Pain Denies     Pain Intervention [] RN notified  [] Repositioned  [] Intervention offered and patient declined  [x] N/A  [] Other:    [] RN notified  [] Repositioned  [] Intervention offered and patient declined  [] N/A  [] Other:   Subjective     Pt seen sitting on edge of bed. Pt cooperative and agreeable to participate. Objective:  Goals           Goal met 08/22/22. Pt tolerating IDDSI level 7 regular solids, thin liquids, meds whole with water 1 pill at a time as LRD. Goal met 08/22/22. Cog Goals:  Short-term Goals  Timeframe for Short-term *goals extended through 09/02/22*    Goal 1: Pt will complete graded recall tasks using compensatory strategies with 90% acc given min cues    Goal not directly targeted. Goal 2: Pt will complete executive function tasks (e.g. money, time, etc) with 90% acc given min cues      Goal not directly targeted. Goal 3: Pt will complete higher level critical thinking tasks with 90% acc given min cues   Deduction Puzzle   -pt given a set of clues to use to fill in a chart; instructed to use logic, reasoning, and problem solving abilities to complete task  -pt completed task with 88% acc indep, improved to 100% acc given min cues       Goal 4: Pt will complete respiratory retraining exercises to improve overall breath support with speech 10/10 given min cues     SLP provided handout with respiratory retraining exercises. SLP reviewed each exercise with pt for her to be able to complete indep at home.      Then pt completed the following:  -exhale while sustaining the sound /sh/: x10  -exhale while sustaining the sound /f/: x10  -exhale while sustaining the sound /z/: x10  -produce a pulse of air using the /ha/ then sniff in through nose with mouth closed: x10  -produce a pulse of air using the /sha/ then sniff in through nose with mouth closed: x10      Other areas targeted: N/A    Education:   SLP edu pt re: deduction puzzle strategies, respiratory retraining exercises     Safety Devices: [x] Call light within reach  [] Chair alarm activated  [x] Bed alarm activated  [] Other: [] Call light within reach  [] Chair alarm activated  [] Bed alarm activated  [] Other:    Assessment: Pt alert and cooperative, agreeable to tx. SLP provided pt with the respiratory retraining exercises handout; reviewed 1 by 1 to ensure pt understood so she could continue to complete indep at d/c. Pt verbalized and demonstrated understanding of each excise. Pt then completed a few of the exercises, 10 reps. Pt did well completing a deduction puzzle using logic, reasoning, and problem solving abilities. Pt benefited from min cues to increase acc. Continue goals above. Plan: Continue as per plan of care. Additional Information:     Barriers toward progress: Decreased endurance  Discharge recommendations:  [] Home independently  [] Home with assistance []  24 hour supervision  [] ECF [x] Other: See PT/OT notes   Continued Tx Upon Discharge: ? [] Yes [x] No [] TBD based on progress while on ARU [] Vital Stim indicated [] Other:   Estimated discharge date: 09/03/22    Interventions used this date:  [] Speech/Language Treatment  [] Instruction in HEP [] Group [] Dysphagia Treatment [x] Cognitive Treatment   [] Other:       Total Time Breakdown / Charges    Time in Time out Total Time / units   Cognitive Tx 1015 1030 15 min/ 1 unit    Speech Tx 1000 1015 15 min/ 1 unit    Dysphagia Tx -- -- --       Electronically Signed by     Cornelius Cowart MA CCC-SLP #[id number]  Speech Language Pathologist

## 2022-08-31 LAB
ANION GAP SERPL CALCULATED.3IONS-SCNC: 7 MMOL/L (ref 3–16)
BUN BLDV-MCNC: 8 MG/DL (ref 7–20)
CALCIUM SERPL-MCNC: 9.5 MG/DL (ref 8.3–10.6)
CHLORIDE BLD-SCNC: 99 MMOL/L (ref 99–110)
CO2: 33 MMOL/L (ref 21–32)
CREAT SERPL-MCNC: <0.5 MG/DL (ref 0.6–1.2)
GFR AFRICAN AMERICAN: >60
GFR NON-AFRICAN AMERICAN: >60
GLUCOSE BLD-MCNC: 121 MG/DL (ref 70–99)
POTASSIUM REFLEX MAGNESIUM: 4.4 MMOL/L (ref 3.5–5.1)
SODIUM BLD-SCNC: 139 MMOL/L (ref 136–145)

## 2022-08-31 PROCEDURE — 94761 N-INVAS EAR/PLS OXIMETRY MLT: CPT

## 2022-08-31 PROCEDURE — 80048 BASIC METABOLIC PNL TOTAL CA: CPT

## 2022-08-31 PROCEDURE — 97110 THERAPEUTIC EXERCISES: CPT

## 2022-08-31 PROCEDURE — 97530 THERAPEUTIC ACTIVITIES: CPT

## 2022-08-31 PROCEDURE — 97535 SELF CARE MNGMENT TRAINING: CPT

## 2022-08-31 PROCEDURE — 94640 AIRWAY INHALATION TREATMENT: CPT

## 2022-08-31 PROCEDURE — 6370000000 HC RX 637 (ALT 250 FOR IP): Performed by: STUDENT IN AN ORGANIZED HEALTH CARE EDUCATION/TRAINING PROGRAM

## 2022-08-31 PROCEDURE — 36415 COLL VENOUS BLD VENIPUNCTURE: CPT

## 2022-08-31 PROCEDURE — 97116 GAIT TRAINING THERAPY: CPT

## 2022-08-31 PROCEDURE — 6360000002 HC RX W HCPCS: Performed by: STUDENT IN AN ORGANIZED HEALTH CARE EDUCATION/TRAINING PROGRAM

## 2022-08-31 PROCEDURE — 6370000000 HC RX 637 (ALT 250 FOR IP): Performed by: INTERNAL MEDICINE

## 2022-08-31 PROCEDURE — 6370000000 HC RX 637 (ALT 250 FOR IP): Performed by: REGISTERED NURSE

## 2022-08-31 PROCEDURE — 1280000000 HC REHAB R&B

## 2022-08-31 PROCEDURE — 2700000000 HC OXYGEN THERAPY PER DAY

## 2022-08-31 PROCEDURE — 97112 NEUROMUSCULAR REEDUCATION: CPT

## 2022-08-31 PROCEDURE — 92507 TX SP LANG VOICE COMM INDIV: CPT

## 2022-08-31 PROCEDURE — 97129 THER IVNTJ 1ST 15 MIN: CPT

## 2022-08-31 RX ADMIN — ONDANSETRON 4 MG: 4 TABLET, ORALLY DISINTEGRATING ORAL at 15:14

## 2022-08-31 RX ADMIN — CALCIUM POLYCARBOPHIL 625 MG: 625 TABLET, FILM COATED ORAL at 20:40

## 2022-08-31 RX ADMIN — NADOLOL 20 MG: 20 TABLET ORAL at 08:28

## 2022-08-31 RX ADMIN — METHOCARBAMOL TABLETS 750 MG: 750 TABLET, COATED ORAL at 08:27

## 2022-08-31 RX ADMIN — PIRFENIDONE 801 MG: 801 TABLET, FILM COATED ORAL at 08:28

## 2022-08-31 RX ADMIN — MIRTAZAPINE 30 MG: 15 TABLET, FILM COATED ORAL at 20:40

## 2022-08-31 RX ADMIN — IPRATROPIUM BROMIDE AND ALBUTEROL SULFATE 1 AMPULE: 2.5; .5 SOLUTION RESPIRATORY (INHALATION) at 20:07

## 2022-08-31 RX ADMIN — HEPARIN SODIUM 5000 UNITS: 5000 INJECTION INTRAVENOUS; SUBCUTANEOUS at 20:42

## 2022-08-31 RX ADMIN — HEPARIN SODIUM 5000 UNITS: 5000 INJECTION INTRAVENOUS; SUBCUTANEOUS at 05:51

## 2022-08-31 RX ADMIN — NICOTINE POLACRILEX 20 MG: 4 GUM, CHEWING ORAL at 08:32

## 2022-08-31 RX ADMIN — IPRATROPIUM BROMIDE AND ALBUTEROL SULFATE 1 AMPULE: 2.5; .5 SOLUTION RESPIRATORY (INHALATION) at 11:17

## 2022-08-31 RX ADMIN — Medication 400 MG: at 08:27

## 2022-08-31 RX ADMIN — HEPARIN SODIUM 5000 UNITS: 5000 INJECTION INTRAVENOUS; SUBCUTANEOUS at 15:13

## 2022-08-31 RX ADMIN — POTASSIUM CHLORIDE 10 MEQ: 750 TABLET, EXTENDED RELEASE ORAL at 20:40

## 2022-08-31 RX ADMIN — LEVOTHYROXINE SODIUM 125 MCG: 0.12 TABLET ORAL at 05:51

## 2022-08-31 RX ADMIN — OXYCODONE AND ACETAMINOPHEN 2 TABLET: 5; 325 TABLET ORAL at 20:41

## 2022-08-31 RX ADMIN — METHOCARBAMOL TABLETS 750 MG: 750 TABLET, COATED ORAL at 15:14

## 2022-08-31 RX ADMIN — ACETAMINOPHEN 1000 MG: 500 TABLET ORAL at 14:22

## 2022-08-31 RX ADMIN — METHOCARBAMOL TABLETS 750 MG: 750 TABLET, COATED ORAL at 20:40

## 2022-08-31 RX ADMIN — CETIRIZINE HYDROCHLORIDE 10 MG: 10 TABLET, FILM COATED ORAL at 08:26

## 2022-08-31 RX ADMIN — ATORVASTATIN CALCIUM 40 MG: 40 TABLET, FILM COATED ORAL at 08:26

## 2022-08-31 RX ADMIN — HYDROCORTISONE ACETATE 25 MG: 25 SUPPOSITORY RECTAL at 08:27

## 2022-08-31 RX ADMIN — POTASSIUM CHLORIDE 10 MEQ: 750 TABLET, EXTENDED RELEASE ORAL at 08:27

## 2022-08-31 RX ADMIN — SERTRALINE HYDROCHLORIDE 200 MG: 50 TABLET ORAL at 08:28

## 2022-08-31 RX ADMIN — OXYCODONE AND ACETAMINOPHEN 2 TABLET: 5; 325 TABLET ORAL at 10:19

## 2022-08-31 RX ADMIN — PANTOPRAZOLE SODIUM 40 MG: 40 TABLET, DELAYED RELEASE ORAL at 05:51

## 2022-08-31 ASSESSMENT — PAIN DESCRIPTION - DESCRIPTORS
DESCRIPTORS: ACHING
DESCRIPTORS: ACHING

## 2022-08-31 ASSESSMENT — PAIN SCALES - GENERAL
PAINLEVEL_OUTOF10: 0
PAINLEVEL_OUTOF10: 7
PAINLEVEL_OUTOF10: 0
PAINLEVEL_OUTOF10: 7
PAINLEVEL_OUTOF10: 5
PAINLEVEL_OUTOF10: 0
PAINLEVEL_OUTOF10: 0

## 2022-08-31 ASSESSMENT — PAIN DESCRIPTION - LOCATION
LOCATION: BACK
LOCATION: BACK
LOCATION: HEAD

## 2022-08-31 ASSESSMENT — PAIN - FUNCTIONAL ASSESSMENT: PAIN_FUNCTIONAL_ASSESSMENT: ACTIVITIES ARE NOT PREVENTED

## 2022-08-31 ASSESSMENT — PAIN DESCRIPTION - ORIENTATION: ORIENTATION: LOWER

## 2022-08-31 NOTE — PLAN OF CARE
Problem: Discharge Planning  Goal: Discharge to home or other facility with appropriate resources  Outcome: Progressing     Problem: Pain  Goal: Verbalizes/displays adequate comfort level or baseline comfort level  Outcome: Progressing     Problem: Skin/Tissue Integrity  Goal: Absence of new skin breakdown  Description: 1.   Monitor for areas of redness and/or skin breakdown      Outcome: Progressing

## 2022-08-31 NOTE — PROGRESS NOTES
Occupational Therapy  Facility/Department: [facility]  Rehabilitation Occupational Therapy Daily Treatment Note    Date: 22  Patient Name: Jannet Bryant       Room: [facility]276[facility]  MRN: [id number]  Account: [de-identified]   : 1955  (73 y.o.) Gender: female                  Past Medical History:  has a past medical history of A-fib ([facility] [address] Anxiety, Cryptogenic organizing pneumonia ([facility] [address] Depression, GERD (gastroesophageal reflux disease), Hyperlipidemia, On home oxygen therapy, Rash, and Thyroid disease. Past Surgical History:   has a past surgical history that includes Cholecystectomy; bronchoscopy (N/A, 2019); bronchoscopy (2019); bronchoscopy (2019); bronchoscopy (2019); bronchoscopy (07/10/2019); bronchoscopy (N/A, 7/10/2019); Hysterectomy, total abdominal; bronchoscopy (Bilateral, 2019); bronchoscopy (N/A, 2019); bronchoscopy (N/A, 2019); Arm Surgery (Left, 3/2/2020); and Colonoscopy (N/A, 2022). Restrictions  Restrictions/Precautions: General Precautions; Fall Risk;Up as Tolerated  Spinal Precautions: TLSO when OOB, can jamila EOB; no lifting >10 lbs  Other position/activity restrictions: 4-6L O2 (initially 4L at rest, increasing to 6L with activity), TLSO, spinal precautions, ok to shower. Subjective  Subjective: Pt sitting in recliner upon arrival, agreeable to OT. Requesting to brush teeth. Reports 5/10 however RN in room to give pain meds. Restrictions/Precautions: General Precautions; Fall Risk;Up as Tolerated           Objective     Cognition  Overall Cognitive Status: Exceptions  Arousal/Alertness: Appropriate responses to stimuli  Following Commands: Follows all commands without difficulty; Follows multistep commands with repitition  Attention Span: Attends with cues to redirect  Memory: Appears intact  Safety Judgement: Decreased awareness of need for safety  Problem Solving: Assistance required to identify errors made;Assistance required to close supervision; O2 line management needed  Bed Mobility  Overall Assistance Level: Independent  Additional Factors: Head of bed flat;Set-up  Bridging  Assistance Level: Independent  Roll Left  Assistance Level: Independent  Roll Right  Assistance Level: Independent  Sit to Supine  Assistance Level: Independent  Supine to Sit  Assistance Level: Independent  Scooting  Assistance Level: Independent  Transfers  Surface: From bed; To bed; Wheelchair;From chair with arms  Additional Factors: Set-up; Verbal cues; Hand placement cues; Increased time to complete; With handrails  Device: Walker  Sit to Stand  Assistance Level: Stand by assist;Supervision  Skilled Clinical Factors: SBA with sit <> stand from EOB, recliner, and WC; cues for safety and sequencing and hand placement at times  Stand to Sit  Assistance Level: Stand by assist;Supervision  Skilled Clinical Factors: SBA with sit <> stand from EOB, recliner, and WC; cues for safety and sequencing and hand placement at times  Bed To/From Chair  Technique: Stand step  Assistance Level: Stand by assist  Skilled Clinical Factors: SBA with all tranfsers, good safety/sequencing with RW and management of O2   OT Exercises  Exercise Treatment: Pt completed seated UE ex with 2# free weight 1 UE at a time 3x10- supination/pronation, wrist flexion/extension, internal/external rotation, shoulder flexion with elbow extension, circles forwards/backwards; pt tolerated well with rest breaks     Second Session:   ADLs  UBD: set up/SPV; pt requiring SPV to collect clothing with good balance, assist to manage O2 cord. Mod A for donning/doffing TSLO  LBD: set-up/SPV for gathering clothing, pt don/doff ind. Toilet transfer: SPV with RW. Assessment:   Pt tolerated session with mod fatigue over, pt initailly on 4L dropping to 75% after ambulation to gather clothing> O2 increase to 6L patient recovering to 90% after 2 minutes. -122 throughout session with activity. Pt educated on PLB. Pt able to transfer to toilet with SPV. Pt left with RN student on raised toilet seat at end of session. Assessment  Assessment  Assessment: Patient remains motivated and pleasant, able to ambulate up to 78 ft (first trial 55 ft) however O2 does drop to 65% on 4L needing 5 minutes on 6 L O2 for recovery to 90%. Pt was able to complete folding clothing sitting therefore LTG met. Pt was educated on home safety and energy conservation, able to rest standing during functional mobility tasks vs sitting. Cont to recommend [address] and commode/shower chair. Cont OT POC. Activity Tolerance: Patient limited by fatigue;Patient tolerated treatment well;Patient limited by endurance  Discharge Recommendations: 24 hour supervision or assist;Home with assist PRN;Home with nursing aide;Home with Home health OT;S Level 3  OT Equipment Recommendations  Equipment Needed: Yes  Mobility Devices: ADL Assistive Devices; Jessica Bruner: Rolling  Wheelchair: Standard; Wheelchair Cushion Standard  ADL Assistive Devices: Toileting - Standard Commode; Shower Chair with back;Grab Bars - toilet;Grab Bars - shower; Reacher;Sock-Aid Hard;Long-handled Sponge  Other: Patient would benefit from [facility] [place] due to being confined to room at night and to provide support with toileting/transfers. Without a BSC pt is at a higher risk of falls. Safety Devices  Safety Devices in place: Yes  Type of devices: All fall risk precautions in place; Patient at risk for falls;Gait belt;Left in bed;Call light within reach; Bed alarm in place;Nurse notified (sitting EOB to eat lunch)  Restraints  Initially in place: No    Patient Education  Education  Education Given To: Patient  Education Provided: Role of Therapy; ADL Function;Plan of Care;Equipment;DME/Home Modifications;Transfer Training;Precautions; Safety; Energy Conservation; Fall Prevention Strategies  Education Provided Comments: spinal precautions, adaptive ADL methods, pursed lip breathing, energy conservation, rest breaks, safety with ADLs  Education Method: Verbal;Demonstration  Barriers to Learning: None  Education Outcome: Verbalized understanding;Demonstrated understanding;Continued education needed    Plan  Plan  Times per Week: 5 out of 7 days  Times per Day: Daily  Plan Weeks: 10 days  Current Treatment Recommendations: Strengthening;ROM;Balance training;Functional mobility training; Endurance training;Gait training;Neuromuscular re-education; Safety education & training;Pain management;Patient/Caregiver education & training;Equipment evaluation, education, & procurement; Modalities; Positioning;Self-Care / ADL; Home management training;Cognitive/Perceptual training;Coordination training    Goals  Patient Goals   Patient goals :  \"To go home and be out of pain\"  Short Term Goals  Time Frame for Short term goals: 5 days (8/21/22)  Short Term Goal 1: Pt will complete standing task x3 minutes SBA with LRAD-GOAL MET 8/23  Short Term Goal 2: Pt will complete toileting/transfer CGA with LRAD and DME PRN- EXTEND TO DC DATE -GOAL 8/29  Short Term Goal 3: Pt will complete LE dressing Valeria using AE and LRAD PRN-GOAL MET 8/22  Long Term Goals  Time Frame for Long term goals : 10 days (8/26/22) - d/c date extended to 8/31  Long Term Goal 1: Pt will complete functional mobility in room/bathroom Rachael with LRAD for ADL management  Long Term Goal 2: Pt will complete IADL task standing vs sitting Rachael with LRAD-GOAL MET 8/31 sitting to complete folding clothing Rachael  Long Term Goal 3: Pt will complete UE/LE bathing/dressing including donning/doffing brace (patient vs family education teachback) setup vs Rachael sitting vs standing with AE vs LRAD-GOAL MET 8/31, setup UE bathing/dressing including brace management  Long Term Goal 4: Pt will complete toileting/transfer with LRAD and DME PRN Rachael        Therapy Time   Individual Concurrent Group Co-treatment   Time In 1000         Time Out 1100         Minutes 60         Timed Code Treatment Minutes: 60 Minutes     Second Session Therapy Time:   Individual Concurrent Group Co-treatment   Time In 0900     0930   Time Out 0930     1000   Minutes 30     30     Timed Code Treatment Minutes:  30 Minutes    Total Treatment Minutes:      Catina Grewal OTR/L  Deann Cancino OTR/L

## 2022-08-31 NOTE — PROGRESS NOTES
Physical Therapy  Facility/Department: [facility]  Rehabilitation Physical Therapy Treatment Note    NAME: Kandi Obando  : 1955 (73 y.o.)  MRN: [id number]  CODE STATUS: Full Code    Date of Service: 22       Restrictions:  Restrictions/Precautions: General Precautions; Fall Risk;Up as Tolerated  Position Activity Restriction  Spinal Precautions: No Bending; No Twisting; No Lifting  Spinal Precautions: TLSO when OOB, can jamila EOB; no lifting >10 lbs  Other position/activity restrictions: 4-6L O2 (initially 4L at rest, increasing to 6L with activity), TLSO, spinal precautions, ok to shower. SUBJECTIVE  Subjective  Subjective: Pt seated EOB upon arrival and agreeable to PT session this afternoon  Pain: Pt initially denies pain however near end of session, pt reports 5/10 headache. RN notified and administers pain medication per pt request            OBJECTIVE  Cognition  Overall Cognitive Status: Exceptions  Arousal/Alertness: Appropriate responses to stimuli  Following Commands: Follows all commands without difficulty; Follows multistep commands with repitition  Attention Span: Attends with cues to redirect  Memory: Appears intact  Safety Judgement: Decreased awareness of need for safety  Problem Solving: Assistance required to identify errors made;Assistance required to correct errors made;Assistance required to implement solutions;Assistance required to generate solutions  Insights: Decreased awareness of deficits  Sequencing: Requires cues for some  Orientation  Overall Orientation Status: Within Normal Limits  Orientation Level: Oriented X4    Functional Mobility  Transfers  Surface: From bed; Wheelchair  Additional Factors: Increased time to complete  Device: Walker (RW)  Sit to Stand  Assistance Level: Modified independent (up to RW)  Skilled Clinical Factors: Pt completes multiple t/f throughout session with RW with grossly mod I, with requires increased time up to RW  Stand to Sit  Assistance Level: Modified independent  Bed To/From Chair  Technique: Stand pivot  Assistance Level: Modified independent  Skilled Clinical Factors: using RW, w/c>EOB at end of session      Environmental Mobility  Ambulation  Surface: Level surface  Device: Rolling walker  Distance: 92ft + 65ft  Activity: Within Room; Within Unit  Activity Comments: distances limited by SOB and fatigue. Pt on 6L O2 with ambulation. Immediately after 1st bout, O2 sats 98% however decrease to 84% over ~30sec. O2 sats slowly recover to 93% after ~3 mins seated rest with min cues for PLB. After 2nd bout, O2 sats 86%, recovering to 90% after 2 mins. Additional Factors: Increased time to complete  Assistance Level: Stand by assist  Gait Deviations: Slow mike;Decreased step length bilateral;Decreased weight shift bilateral;Decreased heel strike right;Decreased heel strike left; Unsteady gait; Wide base of support  Skilled Clinical Factors: Pt with good awareness of need for seated rest breaks. Requires 3-5 mins seated rest break after each bout d/t fatigue and SOB             PT Exercises  Exercise Treatment:   Seated BLE exercises (1.5#): ankle pumps x 25, LAQ x 20, seated marching x 20, glute sets x 20, seated clamshells x 20 (squeezing ball), resisted hip abd x 20 (red TB), resisted knee flexion x 20 (red TB)  Standing BLE exercises (with seated rest break after each exercise d/t SOB and fatigue): marching x 10, R/L weight shifting x 10      ASSESSMENT/PROGRESS TOWARDS GOALS  Vital Signs  Heart Rate: 94  Heart Rate Source: Monitor  BP: 120/75  BP Location: Right upper arm  MAP (Calculated): 90  SpO2: 92 %  O2 Device: Nasal cannula (4L)  Comment: pt placed on 6L O2 with ambulation/activity    Assessment  Assessment: Pt demo's improved activity tolerance with ambulation this date, although remains limited by deconditioning, SOB, and O2 desaturation with activity.  Pt able to complete functional transfers with mod I and ambulate up to 92ft with SBA using RW. O2 sats decrease to 84% with ambulation (on 6L), requiring ~3 mins to recover to 90% or greater. Pt does require increased time for mobility and multiple therapeutic seated rest breaks although pt with good awareness of energy conservation techniques. Pt tolerated LE seated and standing exercises well with intermittent rest breaks d/t SOB and fatigue. Will continue to progress functional mobility as appropriate. Activity Tolerance: Patient tolerated treatment well;Patient limited by fatigue;Patient limited by endurance  Discharge Recommendations: 24 hour supervision or assist;Home with Home health PT  PT Equipment Recommendations  Equipment Needed: Yes  Mobility Devices: Jacquie [place]: Rolling  Other: Pt has w/c at home    Goals  Patient Goals   Patient goals : \"to be able to walk straight\"  Short Term Goals  Time Frame for Short term goals: 5 days- 8/20/22  Short term goal 1: Pt will complete bed mobility with SBA, with min cues for log roll technique -- 8/20: GOAL PARTIALLY MET SBA with bed rails and cues for log roll  8/30 GOAL MET. Short term goal 2: Pt will complete functional transfers with CGA consistently, using LRAD -- 8/23: GOAL PARTIALLY MET pt can transfer at [place], but after an hour of PT requires MIN A due to fatigue. 8/30 GOAL MET.   Short term goal 3: Pt will ambulate 25ft with min A using RW -- 8/23 GOAL MET  Long Term Goals  Time Frame for Long term goals : 10 days- 8/26/22-- goals extended to 9/2 based on updated d/c date  Long term goal 1: Pt will complete bed mobility with mod I without cues for log roll technique 8/30 Goal Met  Long term goal 2: Pt will complete functional transfers with mod I using LRAD 8/30 Goal Met  Long term goal 3: Pt will ambulate 50ft with supervision using LRAD  Long term goal 4: Pt will complete car transfer with supervision using LRAD 8/30 Goal Met  Long term goal 5: Pt will verbalize 3/3 spinal precautions without cues and adhere to precautions with mobility without cues. 8/30 Goal not met, pt states \"I cant bend over and have to wear mybrace\" when asked. PLAN OF CARE/SAFETY  Plan  Plan: 5-7 times per week  Plan weeks: 10 days  Current Treatment Recommendations: Strengthening;Balance training;Functional mobility training;Transfer training; Endurance training; Wheelchair mobility training;Gait training;Stair training;Equipment evaluation, education, & procurement; Neuromuscular re-education;Home exercise program;Safety education & training;Patient/Caregiver education & training;Positioning  Safety Devices  Type of Devices: All fall risk precautions in place;Call light within reach; Bed alarm in place; Patient at risk for falls; Left in bed;Nurse notified    EDUCATION  Education  Education Given To: Patient  Education Provided: Role of Therapy;Plan of Care;Family Education;Mobility Training;Equipment;Precautions;Transfer Training; Safety; Energy Conservation; Fall Prevention Strategies;DME/Home Modifications  Education Provided Comments: Educated pt on importance of OOB mobility, spinal px and use of TLSO, functional mobility and positional changes with back pain, gait, LE exercises  Education Method: Demonstration;Verbal  Barriers to Learning: None  Education Outcome: Verbalized understanding;Demonstrated understanding;Continued education needed        Therapy Time   Individual Concurrent Group Co-treatment   Time In 1330         Time Out 1430         Minutes 60           Timed Code Treatment Minutes: 1000 Kahlil Alexander, PT, DPT 08/31/22 at 2:58 PM

## 2022-08-31 NOTE — PROGRESS NOTES
Christian FarrisSalgado  8/31/2022  [id number]    Chief Complaint: H/O Spinal surgery    Subjective:   No acute events overnight. Today Ramone Rich is seen in her room with Speech. She reports that she is feeling improved. She notes continued sensitive stomach in mornings. At home she typically spaces her medications out. She denies other acute complaints at this time. ROS: denies f/c, n/v, cp, sob    Objective:  Patient Vitals for the past 24 hrs:   BP Temp Temp src Pulse Resp SpO2   08/31/22 0820 127/70 97.4 °F (36.3 °C) Oral 94 20 100 %   08/30/22 2011 -- -- -- 86 18 94 %   08/30/22 1945 115/71 97.6 °F (36.4 °C) Oral 82 22 95 %   08/30/22 1912 -- -- -- -- 18 --   08/30/22 1445 (!) 98/53 98.1 °F (36.7 °C) Oral 82 -- 100 %   08/30/22 1329 133/77 -- -- 88 -- 92 %     Gen: No distress, pleasant. Seated on edge of bed, kyphotic posture  HEENT: Normocephalic, atraumatic. CV: extremities well perfused  Resp: No respiratory distress. On oxygen via nasal cannula  Abd: Soft, nondistended  Ext: No edema. Neuro: Alert, oriented, appropriately interactive.    Psych: appropriate mood and affect    Wt Readings from Last 3 Encounters:   08/30/22 126 lb (57.2 kg)   08/08/22 132 lb (59.9 kg)   08/06/22 132 lb (59.9 kg)       Laboratory data:   Lab Results   Component Value Date    WBC 5.8 08/29/2022    HGB 8.4 (L) 08/29/2022    HCT 26.2 (L) 08/29/2022    MCV 86.4 08/29/2022     08/29/2022       Lab Results   Component Value Date/Time     08/31/2022 06:31 AM    K 4.4 08/31/2022 06:31 AM    CL 99 08/31/2022 06:31 AM    CO2 33 08/31/2022 06:31 AM    BUN 8 08/31/2022 06:31 AM    CREATININE <0.5 08/31/2022 06:31 AM    GLUCOSE 121 08/31/2022 06:31 AM    CALCIUM 9.5 08/31/2022 06:31 AM        Therapy progress:  PT  Position Activity Restriction  Spinal Precautions: No Bending, No Twisting, No Lifting  Spinal Precautions: TLSO when OOB, can jamila EOB; no lifting >10 lbs  Other position/activity restrictions: 4-6L O2 (initially 4L at rest, increasing to 6L with activity), TLSO, spinal precautions, ok to shower. Objective     Sit to Stand: Minimal Assistance (up to RW with increased time)  Stand to sit: Minimal Assistance  Bed to Chair: Minimal assistance, Moderate assistance (using RW via stand pivot transfer)     OT  PT Equipment Recommendations  Equipment Needed: Yes  Mobility Devices: [place] : Rolling  Other: Pt also has w/c at home  Toilet - Technique: Stand step, To left, To right (grab bar vs RW)  Equipment Used: Standard toilet  Toilet Transfers Comments: assist with lifting and lowering, would benefit from [facility] over toilet with RW vs grab bar for support  Assessment        SLP   Pt pleasant and cooperative, agreeable to participate. Pt completed repeat SLUMS scoring a 28/30 today compared to initial evaluation score of 26/30. Pt completed five step written sequencing task with 90% acc indep improving to 100% acc given min cues. Pt does demonstrate min difficulty with higher level executive functioning tasks (but has assist from  and dtr at home with meds/finances). Pt is making consistent progress towards goals and remains motivated to improve. Continue goals above. Body mass index is 23.05 kg/m². Assessment and Plan: Tahira Clark is a 69year old female with a past medical history significant for chronic respiratory failure on 3 L home O2, ILD, COPD, paroxysmal atrial fibrillation, DM2, HTN, HLD, and thyroid disease who presented to  on 8/11/22 for planned T11-L3 PDFF with L1 corpectomy for L1 burst fracture. She was admitted to [facility] on 8/16/22 due to functional deficits below her baseline.      L1 Burst Fracture  - s/p T11-L3 PDFF with L1 corpectomy  - TLSO when OOB  - pain control  - staples removed 8/24  - incision open to air  - PT, OT     Chronic Respiratory Failure  ILD/COPD  - on 3 L home O2 at baseline  - now with increased oxygen requirement  - XR chest negative for acute process  - pifenidone TID, has not been regularly taking  - scheduled duo-nebs  - Pulm consulted, appreciate assistance. No acute process   - 3 L at rest and ok to be up to 6 L with activity  - humidified air     Large Bleeding hemorrhoids  - colonoscopy 8/25  - hydrocortisone suppositories per GI  - outpatient follow up    Colon polyp  - removed on colonoscopy 8/25  - follow up with GI OP    Hypokalemia  - resume daily supplement  - continue to monitor intermittently      pAF  - not been on [facility]  - monitor     DM2  - on januvia and jardiance at home, family brought in home meds  - glucose has been low due to poor PO intake, hold off on resuming home meds     Hypothyroidism  - synthroid     GERD  - PPI  - PRN omeprazole per home regimen     Anxiety/Depression  - zoloft, mirtazipine     HLD  - statin      Bowels: Schedule stool softener. Follow bowel movements. Enema or suppository if needed. Bladder: Check PVR x 3. [address] if PVR > 200ml or if any volume is > 500 ml. Sleep: Trazodone provided prn. Follow up appointments: Ortho Spine, PCP, Pulm    Services: home health PT, OT, ST, nursing  EDOD: 9/2/22    Interdisciplinary team conference was held today with entire rehab treatment team including PT, OT, SLP, Dietician, RN, and SW. Discussion focused on progress toward rehab goals and discharge planning. Barriers: endurance, cormorbidities, pain, nausea. Total treatment time >35 min with greater than 50% spent in care coordination. [facility] Jar[facility] MD 8/31/2022, 10:42 AM

## 2022-08-31 NOTE — PROGRESS NOTES
Speech Language Pathology  [facility]: ACUTE REHAB UNIT  SPEECH-LANGUAGE PATHOLOGY      [x] Daily  [] Weekly Care Conference Note  [] Discharge    Shmuel Obando      :1955  AGX:[id number]  Rehab Dx/Hx: H/O Spinal surgery [Z98.890]    Precautions: falls  Home situation: lives with , dtr manages meds, shares finances with ,  does the cooking, cleaning, laundry, yardwork, not an active    ST Dx: [] Aphasia  [] Dysarthria  [] Apraxia   [x] Oropharyngeal dysphagia [x] Cognitive Impairment  [] Other:   Date of Admit: 2022  Room #: 0162/0162-01    Current functional status (updated daily):         Pt being seen for : [x] Speech/Language Treatment  [] Dysphagia Treatment [] Cognitive Treatment  [] Other:  Communication: [x]WFL  [] Aphasia  [] Dysarthria  [] Apraxia  [] Pragmatic Impairment [] Non-verbal  [] Hearing Loss  [] Other:   Cognition: [] WFL  [x] Mild  [] Moderate  [] Severe [] Unable to Assess  [] Other:  Memory: [] WFL  [x] Mild  [] Moderate  [] Severe [] Unable to Assess  [] Other:  Behavior: [x] Alert  [x] Cooperative  [x]  Pleasant  [] Confused  [] Agitated  [] Uncooperative  [] Distractible [] Motivated  [] Self-Limiting [] Anxious  [] Other:  Endurance:  [] Adequate for participation in SLP sessions  [x] Reduced overall  [x] Lethargic  [] Other:  Safety: [x] No concerns at this time  [] Reduced insight into deficits  []  Reduced safety awareness [] Not following call light procedures  [] Unable to Assess  [] Other:    Current Diet Order:ADULT DIET;  Regular  ADULT ORAL NUTRITION SUPPLEMENT; Breakfast, Dinner; Standard High Calorie/High Protein Oral Supplement  Swallowing Precautions: upright for all intake, stay upright for at least 30 mins after intake, small bites/sips, alternate bites/sips, slow rate of intake, general GERD precautions, and general aspiration precautions        Date: 2022      Tx session 1  [id number] Tx session 2  8429-1475 Total Timed Code Min 0 10   Total Treatment Minutes 20 10   Individual Treatment Minutes 20 10   Group Treatment Minutes 0 0   Co-Treat Minutes 0 0   Variance/Reason:  10- going to the bathroom  0   Pain Denies  Denies   Pain Intervention [] RN notified  [] Repositioned  [] Intervention offered and patient declined  [x] N/A  [] Other:    [] RN notified  [] Repositioned  [] Intervention offered and patient declined  [x] N/A  [] Other:   Subjective     Pt seen sitting on edge of bed. Pt cooperative and agreeable to participate. Pt alert and oriented, cooperative and agreeable to participate in therapy. Pt seen partially reclined in bed. Objective:  Goals           Goal met 08/22/22. Pt tolerating IDDSI level 7 regular solids, thin liquids, meds whole with water 1 pill at a time as LRD. Goal met 08/22/22. Pt tolerating IDDSI level 7 regular solids, thin liquids, meds whole with water 1 pill at a time as LRD. Goal met 08/22/22. Goal met 08/22/22. Cog Goals:  Short-term Goals  Timeframe for Short-term *goals extended through 09/02/22*    Goal 1: Pt will complete graded recall tasks using compensatory strategies with 90% acc given min cues    Did not target. Functional recall of 10 items:  -object to object association  -pt given five pairs of objects and asked to create an association for recall  -100% acc indep     Recall after a 3 min delay:  -100% acc indep   Goal 2: Pt will complete executive function tasks (e.g. money, time, etc) with 90% acc given min cues      Did not target. Did not target. Goal 3: Pt will complete higher level critical thinking tasks with 90% acc given min cues   Did not target. Did not target.     Goal 4: Pt will complete respiratory retraining exercises to improve overall breath support with speech 10/10 given min cues     -exhale while sustaining the sound /sh/: x10  -exhale while sustaining the sound /f/: x10  -exhale while sustaining the sound /z/: x10  -produce pulse of air using the /ha/ sniff in through nose with mouth closed  -diaphragmatic breathing: x20   Did not target. Other areas targeted: N/A N/A   Education:   Edu provided re: importance of deep breathing and exercises   Edu provided re: association strategy     Safety Devices: [x] Call light within reach  [] Chair alarm activated  [x] Bed alarm activated  [] Other: [x] Call light within reach  [] Chair alarm activated  [x] Bed alarm activated  [] Other:    Assessment: Tx session 1: Pt pleasant and cooperative, agreeable to participate. -10 mins at beg of tx session as pt was using the bathroom, will complete additional mins this afternoon. Pt completed respiratory retraining exercises x10 with occasional min cues required. Pt completed diaphragmatic breathing x20 indep. Pt is making good progress towards goals. Tx session 2: Pt pleasant and cooperative, agreeable to participate. Pt completed higher level recall task with use specific use of association strategies, with 100% acc indep. Pt remains motivated to improve and is making consistent progress towards goals. Plan: Continue as per plan of care. Additional Information:     Barriers toward progress: Decreased endurance  Discharge recommendations:  [] Home independently  [] Home with assistance []  24 hour supervision  [] ECF [x] Other: See PT/OT notes   Continued Tx Upon Discharge: ? [] Yes [x] No [] TBD based on progress while on ARU [] Vital Stim indicated [] Other:   Estimated discharge date: 09/03/22    Interventions used this date:  [] Speech/Language Treatment  [] Instruction in HEP [] Group [] Dysphagia Treatment [x] Cognitive Treatment   [] Other: Total Time Breakdown / Charges    Time in Time out Total Time / units   Cognitive Tx 1430 1440 10 min/ 1 unit    Speech Tx 0840 0900 20 min/ 1 unit    Dysphagia Tx -- -- --       Electronically Signed by     Joleen Weaver. A CCC-SLP  Speech-Language Pathologist  [id number]

## 2022-09-01 PROCEDURE — 1280000000 HC REHAB R&B

## 2022-09-01 PROCEDURE — 97110 THERAPEUTIC EXERCISES: CPT

## 2022-09-01 PROCEDURE — 97129 THER IVNTJ 1ST 15 MIN: CPT

## 2022-09-01 PROCEDURE — 94640 AIRWAY INHALATION TREATMENT: CPT

## 2022-09-01 PROCEDURE — 6360000002 HC RX W HCPCS: Performed by: STUDENT IN AN ORGANIZED HEALTH CARE EDUCATION/TRAINING PROGRAM

## 2022-09-01 PROCEDURE — 97116 GAIT TRAINING THERAPY: CPT

## 2022-09-01 PROCEDURE — 92507 TX SP LANG VOICE COMM INDIV: CPT

## 2022-09-01 PROCEDURE — 94761 N-INVAS EAR/PLS OXIMETRY MLT: CPT

## 2022-09-01 PROCEDURE — 97530 THERAPEUTIC ACTIVITIES: CPT

## 2022-09-01 PROCEDURE — 6370000000 HC RX 637 (ALT 250 FOR IP): Performed by: STUDENT IN AN ORGANIZED HEALTH CARE EDUCATION/TRAINING PROGRAM

## 2022-09-01 PROCEDURE — 97535 SELF CARE MNGMENT TRAINING: CPT

## 2022-09-01 PROCEDURE — 6370000000 HC RX 637 (ALT 250 FOR IP): Performed by: INTERNAL MEDICINE

## 2022-09-01 PROCEDURE — 6370000000 HC RX 637 (ALT 250 FOR IP): Performed by: REGISTERED NURSE

## 2022-09-01 PROCEDURE — 2700000000 HC OXYGEN THERAPY PER DAY

## 2022-09-01 RX ORDER — LANOLIN ALCOHOL/MO/W.PET/CERES
400 CREAM (GRAM) TOPICAL DAILY
Qty: 30 TABLET | Refills: 1 | Status: SHIPPED | OUTPATIENT
Start: 2022-09-02

## 2022-09-01 RX ORDER — POTASSIUM CHLORIDE 750 MG/1
10 TABLET, FILM COATED, EXTENDED RELEASE ORAL 2 TIMES DAILY
Qty: 60 TABLET | Refills: 1 | Status: SHIPPED | OUTPATIENT
Start: 2022-09-01

## 2022-09-01 RX ORDER — ERGOCALCIFEROL 1.25 MG/1
50000 CAPSULE ORAL WEEKLY
Qty: 5 CAPSULE | Refills: 1 | Status: ON HOLD | OUTPATIENT
Start: 2022-09-04 | End: 2022-10-22 | Stop reason: HOSPADM

## 2022-09-01 RX ORDER — OXYCODONE HYDROCHLORIDE AND ACETAMINOPHEN 5; 325 MG/1; MG/1
1 TABLET ORAL EVERY 4 HOURS PRN
Qty: 42 TABLET | Refills: 0 | Status: SHIPPED | OUTPATIENT
Start: 2022-09-01 | End: 2022-09-08

## 2022-09-01 RX ORDER — CALCIUM POLYCARBOPHIL 625 MG 625 MG/1
625 TABLET ORAL NIGHTLY
Refills: 4 | COMMUNITY
Start: 2022-09-01

## 2022-09-01 RX ORDER — METHOCARBAMOL 750 MG/1
750 TABLET, FILM COATED ORAL 3 TIMES DAILY
Qty: 90 TABLET | Refills: 1 | Status: SHIPPED | OUTPATIENT
Start: 2022-09-01 | End: 2022-10-01

## 2022-09-01 RX ORDER — ONDANSETRON 4 MG/1
4 TABLET, ORALLY DISINTEGRATING ORAL EVERY 4 HOURS PRN
Qty: 30 TABLET | Refills: 0 | Status: SHIPPED | OUTPATIENT
Start: 2022-09-01 | End: 2022-09-08

## 2022-09-01 RX ADMIN — OXYCODONE AND ACETAMINOPHEN 2 TABLET: 5; 325 TABLET ORAL at 15:23

## 2022-09-01 RX ADMIN — LEVOTHYROXINE SODIUM 125 MCG: 0.12 TABLET ORAL at 06:24

## 2022-09-01 RX ADMIN — CALCIUM POLYCARBOPHIL 625 MG: 625 TABLET, FILM COATED ORAL at 20:25

## 2022-09-01 RX ADMIN — CETIRIZINE HYDROCHLORIDE 10 MG: 10 TABLET, FILM COATED ORAL at 07:58

## 2022-09-01 RX ADMIN — IPRATROPIUM BROMIDE AND ALBUTEROL SULFATE 1 AMPULE: 2.5; .5 SOLUTION RESPIRATORY (INHALATION) at 07:36

## 2022-09-01 RX ADMIN — OXYCODONE AND ACETAMINOPHEN 2 TABLET: 5; 325 TABLET ORAL at 02:06

## 2022-09-01 RX ADMIN — METHOCARBAMOL TABLETS 750 MG: 750 TABLET, COATED ORAL at 07:58

## 2022-09-01 RX ADMIN — IPRATROPIUM BROMIDE AND ALBUTEROL SULFATE 1 AMPULE: 2.5; .5 SOLUTION RESPIRATORY (INHALATION) at 20:41

## 2022-09-01 RX ADMIN — ONDANSETRON 4 MG: 4 TABLET, ORALLY DISINTEGRATING ORAL at 15:25

## 2022-09-01 RX ADMIN — HYDROXYZINE HYDROCHLORIDE 10 MG: 10 TABLET ORAL at 20:25

## 2022-09-01 RX ADMIN — METHOCARBAMOL TABLETS 750 MG: 750 TABLET, COATED ORAL at 20:25

## 2022-09-01 RX ADMIN — HEPARIN SODIUM 5000 UNITS: 5000 INJECTION INTRAVENOUS; SUBCUTANEOUS at 21:39

## 2022-09-01 RX ADMIN — ACETAMINOPHEN 1000 MG: 500 TABLET ORAL at 20:25

## 2022-09-01 RX ADMIN — HEPARIN SODIUM 5000 UNITS: 5000 INJECTION INTRAVENOUS; SUBCUTANEOUS at 06:25

## 2022-09-01 RX ADMIN — Medication 400 MG: at 07:57

## 2022-09-01 RX ADMIN — HYDROCORTISONE ACETATE 25 MG: 25 SUPPOSITORY RECTAL at 07:59

## 2022-09-01 RX ADMIN — PANTOPRAZOLE SODIUM 40 MG: 40 TABLET, DELAYED RELEASE ORAL at 06:24

## 2022-09-01 RX ADMIN — POTASSIUM CHLORIDE 10 MEQ: 750 TABLET, EXTENDED RELEASE ORAL at 20:25

## 2022-09-01 RX ADMIN — OXYCODONE AND ACETAMINOPHEN 2 TABLET: 5; 325 TABLET ORAL at 23:18

## 2022-09-01 RX ADMIN — SERTRALINE HYDROCHLORIDE 200 MG: 50 TABLET ORAL at 07:57

## 2022-09-01 RX ADMIN — MIRTAZAPINE 30 MG: 15 TABLET, FILM COATED ORAL at 20:25

## 2022-09-01 RX ADMIN — NADOLOL 20 MG: 20 TABLET ORAL at 11:24

## 2022-09-01 RX ADMIN — OXYCODONE AND ACETAMINOPHEN 2 TABLET: 5; 325 TABLET ORAL at 10:22

## 2022-09-01 RX ADMIN — ATORVASTATIN CALCIUM 40 MG: 40 TABLET, FILM COATED ORAL at 07:57

## 2022-09-01 RX ADMIN — POTASSIUM CHLORIDE 10 MEQ: 750 TABLET, EXTENDED RELEASE ORAL at 07:58

## 2022-09-01 RX ADMIN — METHOCARBAMOL TABLETS 750 MG: 750 TABLET, COATED ORAL at 15:25

## 2022-09-01 RX ADMIN — HEPARIN SODIUM 5000 UNITS: 5000 INJECTION INTRAVENOUS; SUBCUTANEOUS at 15:23

## 2022-09-01 ASSESSMENT — PAIN SCALES - GENERAL
PAINLEVEL_OUTOF10: 8
PAINLEVEL_OUTOF10: 5
PAINLEVEL_OUTOF10: 8
PAINLEVEL_OUTOF10: 8
PAINLEVEL_OUTOF10: 0
PAINLEVEL_OUTOF10: 7

## 2022-09-01 ASSESSMENT — PAIN DESCRIPTION - LOCATION
LOCATION: HEAD
LOCATION: BACK

## 2022-09-01 ASSESSMENT — PAIN DESCRIPTION - ORIENTATION: ORIENTATION: MID;LOWER

## 2022-09-01 ASSESSMENT — PAIN DESCRIPTION - DESCRIPTORS: DESCRIPTORS: THROBBING

## 2022-09-01 NOTE — PROGRESS NOTES
Comprehensive Nutrition Assessment    Type and Reason for Visit:  Reassess    Nutrition Recommendations/Plan:   Continue general diet   Continue Ensure BID   Encourage PO and protein intakes   Monitor further diet education needs on high protein foods   Monitor nutrition adequacy, pertinent labs, bowel habits, wt changes, and clinical progress     Malnutrition Assessment:  Malnutrition Status:  Severe malnutrition (08/17/22 1341)    Context:  Chronic Illness     Findings of the 6 clinical characteristics of malnutrition:  Energy Intake:  75% or less estimated energy requirements for 1 month or longer  Weight Loss:  Greater than 20% over 1 year     Muscle Mass Loss:  Severe muscle mass loss Temples (temporalis), Clavicles (pectoralis & deltoids), Hand (interosseous)    Nutrition Assessment:    Follow up: Pt remains nutritionally at risk AEB variable PO intakes. Pt reports appetite continues to improve, pt consumed ~75% of lunch. Weights continue to trend down this admission. Pt with no protein food on meal tray. Discussed importance of adding protein with meals. Handouts on high protein food provided to pt and discussion of high protein foods. Pt favorable to ONS, will continue. No further nutrition questions or concerns. Will monitor. Nutrition Related Findings:    BM x3 on 8/30. Labs reviewdd. Active BS. Wound Type: Pressure Injury, Stage II, Surgical Incision       Current Nutrition Intake & Therapies:    Average Meal Intake: 1-25%, 26-50%, 51-75%, %  Average Supplements Intake: 0%, 1-25%, %  ADULT DIET; Regular  ADULT ORAL NUTRITION SUPPLEMENT; Breakfast, Dinner; Standard High Calorie/High Protein Oral Supplement    Anthropometric Measures:  Height: 5' 2\" (157.5 cm)  Ideal Body Weight (IBW): 110 lbs (50 kg)    Admission Body Weight: 138 lb (62.6 kg) (bed scale)  Current Body Weight: 117 lb (53.1 kg), 125.5 % IBW.  Weight Source: Standing Scale  Current BMI (kg/m2): 21.4  Usual Body Weight: 174 lb (78.9 kg) (bed scale 1/24/22)  % Weight Change (Calculated): -20.7  Weight Adjustment For: No Adjustment                 BMI Categories: Overweight (BMI 25.0-29. 9)    Estimated Daily Nutrient Needs:  Energy Requirements Based On: Kcal/kg (25-30)  Weight Used for Energy Requirements: Current  Energy (kcal/day): 0054-1342 kcal  Weight Used for Protein Requirements: Current (1.2-1.5 g/kg)  Protein (g/day): 74-93 g  Method Used for Fluid Requirements: 1 ml/kcal  Fluid (ml/day): 7803-1403 mL    Nutrition Diagnosis:   Severe malnutrition related to inadequate protein-energy intake as evidenced by poor intake prior to admission, weight loss, severe muscle loss    Nutrition Interventions:   Food and/or Nutrient Delivery: Continue Current Diet, Continue Oral Nutrition Supplement  Nutrition Education/Counseling: Education initiated  Coordination of Nutrition Care: Continue to monitor while inpatient, Interdisciplinary Rounds  Plan of Care discussed with: Patient    Goals:  Previous Goal Met: Progressing toward Goal(s)  Goals: PO intake 50% or greater, prior to discharge       Nutrition Monitoring and Evaluation:   Behavioral-Environmental Outcomes: None Identified  Food/Nutrient Intake Outcomes: Food and Nutrient Intake, Supplement Intake  Physical Signs/Symptoms Outcomes: Biochemical Data, Nutrition Focused Physical Findings, Weight, Meal Time Behavior    Nutrition Education:  Educated on high protein diet  Learners: Patient  Readiness: Acceptance  Method: Explanation and Handout  Response: Verbalizes Understanding  Contact name and number provided.     Discharge Planning:    Continue current diet, Continue Oral Nutrition Supplement     Loyd Santana, MS, RD, LD  Contact: [phone]

## 2022-09-01 NOTE — PROGRESS NOTES
Occupational Therapy  Discharge Summary     Name:Armin Obando  JWB:[id number]  :1955  Treatment Diagnosis: impaired mobility and strength  Diagnosis: L1 burst fx s/p T11-L3 PDFF with L1 corpectomy,     Restrictions/Precautions  Restrictions/Precautions: General Precautions, Fall Risk, Up as Tolerated           Position Activity Restriction  Spinal Precautions: No Bending, No Twisting, No Lifting  Spinal Precautions: TLSO when OOB, can jamila EOB; no lifting >10 lbs  Other position/activity restrictions: 4-6L O2 (initially 4L at rest, increasing to 6L with activity), TLSO, spinal precautions, ok to shower. Goals:   Patient Goals   Patient goals : \"To go home and be out of pain\"  Short Term Goals  Time Frame for Short term goals: 5 days (22)  Short Term Goal 1: Pt will complete standing task x3 minutes SBA with LRAD-GOAL MET   Short Term Goal 2: Pt will complete toileting/transfer CGA with LRAD and DME PRN- EXTEND TO DC DATE -GOAL   Short Term Goal 3: Pt will complete LE dressing Valeria using AE and LRAD PRN-GOAL MET   Long Term Goals  Time Frame for Long term goals : 10 days (22) - d/c date extended to   Long Term Goal 1: Pt will complete functional mobility in room/bathroom Rachael with LRAD for ADL management- GOAL MET   Long Term Goal 2: Pt will complete IADL task standing vs sitting Rachael with LRAD-GOAL MET  sitting to complete folding clothing Rachael  Long Term Goal 3: Pt will complete UE/LE bathing/dressing including donning/doffing brace (patient vs family education teachback) setup vs Rachael sitting vs standing with AE vs LRAD-GOAL MET , setup UE bathing/dressing including brace management  Long Term Goal 4: Pt will complete toileting/transfer with LRAD and DME PRN Rachael- Goal partially met at supervision for safety     Pt. Met 3/3 short term goals and 3/4 long term goals.    Pt required supervision at times d/t safety concerns vs Mod I    ADL:  Grooming/Oral Hygiene  Assistance Level: Modified independent  Skilled Clinical Factors: sitting in WC at sink to complete oral care and hair care, pt with good tolerance of hair dryer use with moving in all directions to dry hair  Upper Extremity Bathing  Assistance Level: Modified independent  Skilled Clinical Factors: Pt sat on TTB and was able to complete all UE bathing tasks with setup; pt ind turned on water and adjusted to appropriate temp  Lower Extremity Bathing  Assistance Level: Modified independent  Skilled Clinical Factors: sitting on TTB in shower to complete all aspects of LB bathing  Upper Extremity Dressing  Assistance Level: Minimal assistance  Skilled Clinical Factors: d/t fatigue after toileting OT gathered pt's clothes and placed on walker for pt to transport into bathroom, doffed brace and shirt mod I sitting and donned shirt without assistance; increased time to don brace and Min A thread RUE and secure brace  Lower Extremity Dressing  Assistance Level: Contact guard assist  Skilled Clinical Factors: CGA to doff pants at toilet and TTB, CGA while in stance for pt to manage clothing over hips, pt self initiated use of grab bars for increased stability and safety  Putting On/Taking Off Footwear  Assistance Level: Modified independent  Skilled Clinical Factors: pt was able to jamila/doff socks and shoes without assistance  Toileting  Assistance Level: Verbal cues; Increased time to complete;Stand by assist  Skilled Clinical Factors: SBA for lynnette care and clothing management after BM/urination  Toilet Transfers  Technique:  (ambulating with RW)  Equipment: Standard toilet;Grab bars  Additional Factors: Increased time to complete  Assistance Level: Supervision  Skilled Clinical Factors: PRN cues for O2 line management  Tub/Shower Transfers  Type: Shower  Transfer From: Rolling walker  Transfer To: Tub transfer bench  Additional Factors: Increased time to complete; With handrails  Assistance Level: Supervision  Skilled Clinical Factors: Supervision for transfer from toilet to TTB walk in shower with RW          Functional Mobility  Device: Rolling walker  Activity: To/From bathroom  Assistance Level: Stand by assist  Skilled Clinical Factors: SBA progressing to Supervision for mobility in/out/around bathroom, pt required O2 line management PRN  Sit to Supine  Assistance Level: Contact guard assist  Skilled Clinical Factors: CGA in anticipation of BLE assistance during log rolling education and trial with HOB flat to aide in proper back precaution adherence  Transfers  Surface: To bed;From bed;From chair with arms;Standard toilet  Device: Walker  Sit to Stand  Assistance Level: Stand by assist  Skilled Clinical Factors: SBA with sit <> stand from EOB, recliner, and WC  Stand to Sit  Assistance Level: Stand by assist  Skilled Clinical Factors: SBA with sit <> stand from EOB, recliner, and WC  Bed To/From Chair  Technique: Stand step  Assistance Level: Stand by assist  Skilled Clinical Factors: SBA for stand step to right, chair to bed   OT Exercises  A/AROM Exercises: seated BUE with 2# free weights x15 alternating elbow flexion/extension, x5 alternating chest press; pt required increased time for rest breaks during activity; pt requesting to stop BUE ther ex d/t increased lower back pain and to return to bed with RN to admin meds       Assessment:   Assessment at [facility]: Pt is a 69 y.o. female with history of L1 burst fracture and failure of conservative management now s/p T11-L3 PDFF with L1 corpectomy, with intraoperative CSF leak repaired. Prior to admission pt was independent using 4ww and grab bars with tranfsers, ADLs, and mobility. Currently pt is functioning below baseline needing Valeria for bed mobility, min-modA for transfers/mobility using grab bars vs RW, modA for UE ADLs, max-depA for LE ADLs and limited by pain, endurance, and back px.     Assessment at d/c: Елена Obando is progressing in therapy steadily, currently limited by pain and posture. Demo'd fair safety awareness, requiring PRN A for O2 line management. Functional Mobility: Supervision for toilet transfer with RW, SBA for STS with RW and increased time  ADL: Mod I for bathing and grooming while seated, Min A for upper body dressing (TLSO) and CGA while in stance for LB dressing, SBA for in stance aspects of toileting  TA: BUE AROM with 2# weights  Activity was tolerated fairly well, pt required increased recovery time with increase in O2 to 6L from 4L, pt tolerated titration to 4L at end of session after activity. Activity Tolerance: Patient tolerated treatment well;Patient limited by fatigue;Patient limited by endurance; Patient limited by pain  Discharge Recommendations: 24 hour supervision or assist;Home with assist PRN;Home with nursing aide;Home with Home health OT;S Level 3    OT Equipment Recommendations  Equipment Needed: Yes  Mobility Devices: ADL Assistive Devices  Walker: Rolling  Wheelchair: Standard; Wheelchair Cushion Standard  ADL Assistive Devices: Toileting - Standard Commode; Shower Chair with back;Grab Bars - toilet;Grab Bars - shower; Reacher;Sock-Aid Hard;Long-handled Sponge  Other: Patient would benefit from [facility] [place] due to being confined to room at night and to provide support with toileting/transfers. Without a BSC pt is at a higher risk of falls. Safety Devices  Safety Devices in place: Yes  Type of devices: All fall risk precautions in place; Patient at risk for falls;Gait belt;Left in bed;Call light within reach; Bed alarm in place;Nurse notified  Restraints  Initially in place: No       Home Exercise Program: N/A    Signs and Symptoms of Delirium (from CAM)  A. Acute Onset Mental Status Change  Is there evidence of an acute change in mental status from the patient's baseline? [x] 0. No [] 1. Yes    B.  Inattention: Did the patient have difficulty focusing attention, for example being easily distractible or having difficulty keeping track of what was being said? [x] 0. Behavior not present    [] 1. Behavior continuously present, does not fluctuate   [] 2. Behavior present, fluctuates (comes and goes, changes in severity)    C. Disorganized thinking: Was the patient's thinking disorganized or incoherent (rambling or irrelevant conversation, unclear or illogical flow of ideas, or unpredictable switching from subject to subject)? [x] 0. Behavior not present    [] 1. Behavior continuously present, does not fluctuate   [] 2. Behavior present, fluctuates (comes and goes, changes in severity)    D. Altered level of consciousness: Did the patient have altered level of consciousness as indicated by any of the following criteria? Vigilant: startled easily to any sound or touch  Lethargic: repeatedly dozed off when being asked questions, but responded to voice or touch  Stuporous: very difficult to arouse and keep aroused for the interview  Comatose: could not be aroused  [x] 0. Behavior not present    [] 1. Behavior continuously present, does not fluctuate   [] 2.  Behavior present, fluctuates (comes and goes, changes in severity)      Electronically signed by Luli Mcqueen OT on 9/1/2022 at 12:26 PM

## 2022-09-01 NOTE — PROGRESS NOTES
Occupational Therapy  Facility/Department: [facility] [place]  Rehabilitation Occupational Therapy Daily Treatment Note    Date: 22  Patient Name: Paulino Obando       Room: 2902/0364-97  MRN: [id number]  Account: [de-identified]   : 1955  (73 y.o.) Gender: female                    Past Medical History:  has a past medical history of A-fib ([facility] [address] Anxiety, Cryptogenic organizing pneumonia ([facility] [address] Depression, GERD (gastroesophageal reflux disease), Hyperlipidemia, On home oxygen therapy, Rash, and Thyroid disease. Past Surgical History:   has a past surgical history that includes Cholecystectomy; bronchoscopy (N/A, 2019); bronchoscopy (2019); bronchoscopy (2019); bronchoscopy (2019); bronchoscopy (07/10/2019); bronchoscopy (N/A, 7/10/2019); Hysterectomy, total abdominal; bronchoscopy (Bilateral, 2019); bronchoscopy (N/A, 2019); bronchoscopy (N/A, 2019); Arm Surgery (Left, 3/2/2020); and Colonoscopy (N/A, 2022). Restrictions  Restrictions/Precautions: General Precautions; Fall Risk;Up as Tolerated  Spinal Precautions: TLSO when OOB, can jamila EOB; no lifting >10 lbs  Other position/activity restrictions: 4-6L O2 (initially 4L at rest, increasing to 6L with activity), TLSO, spinal precautions, ok to shower. Subjective  Subjective: pt sitting EOB with student nurse at arrival, pleasant and agreeable to therapy, requesting to toilet; RN approved therapy  Restrictions/Precautions: General Precautions; Fall Risk;Up as Tolerated             Objective     Cognition  Overall Cognitive Status: Exceptions  Arousal/Alertness: Appropriate responses to stimuli  Following Commands: Follows all commands without difficulty; Follows multistep commands with repitition  Attention Span: Appears intact  Memory: Appears intact  Safety Judgement: Decreased awareness of need for safety  Problem Solving: Assistance required to identify errors made;Assistance required to correct errors time to complete  Assistance Level: Supervision  Skilled Clinical Factors: PRN cues for O2 line management    Tub/Shower Transfers  Type: Shower  Transfer From: Rolling walker  Transfer To: Tub transfer bench  Additional Factors: Increased time to complete; With handrails  Assistance Level: Supervision  Skilled Clinical Factors: Supervision for transfer from toilet to TTB walk in shower with RW          Functional Mobility  Device: Rolling walker  Activity: To/From bathroom  Assistance Level: Stand by assist  Skilled Clinical Factors: SBA progressing to Supervision for mobility in/out/around bathroom, pt required O2 line management PRN  Sit to Supine  Assistance Level: Contact guard assist  Skilled Clinical Factors: CGA in anticipation of BLE assistance during log rolling education and trial with HOB flat to aide in proper back precaution adherence  Transfers  Surface: To bed;From bed;From chair with arms;Standard toilet  Device: Walker  Sit to Stand  Assistance Level: Stand by assist  Skilled Clinical Factors: SBA with sit <> stand from EOB, recliner, and WC  Stand to Sit  Assistance Level: Stand by assist  Skilled Clinical Factors: SBA with sit <> stand from EOB, recliner, and WC  Bed To/From Chair  Technique: Stand step  Assistance Level: Stand by assist  Skilled Clinical Factors: SBA for stand step to right, chair to bed     OT Exercises  A/AROM Exercises: seated BUE with 2# free weights x15 alternating elbow flexion/extension, x5 alternating chest press; pt required increased time for rest breaks during activity; pt requesting to stop BUE ther ex d/t increased lower back pain and to return to bed with RN to admin meds     Assessment  Assessment  Assessment: Anjali Obando is progressing in therapy steadily, currently limited by pain and posture. Demo'd fair safety awareness, requiring PRN A for O2 line management.    Functional Mobility: Supervision for toilet transfer with RW, SBA for STS with RW and increased time  ADL: Mod I for bathing and grooming while seated, Min A for upper body dressing (TLSO) and CGA while in stance for LB dressing, SBA for in stance aspects of toileting  TA: BUE AROM with 2# weights  Activity was tolerated fairly well, pt required increased recovery time with increase in O2 to 6L from 4L, pt tolerated titration to 4L at end of session after activity. Continue OT POC to address performance deficits in ADLs and functional mobility. Activity Tolerance: Patient tolerated treatment well;Patient limited by fatigue;Patient limited by endurance; Patient limited by pain. Vitals: BP: 138/88, SPO2 91% on 6L, ; de sat to 65% after toileting while on 4L, titrated up to 6L with recovery to >91%, RN aware     Discharge Recommendations: 24 hour supervision or assist;Home with assist PRN;Home with nursing aide;Home with Home health OT;S Level 3  OT Equipment Recommendations  Equipment Needed: Yes  Mobility Devices: ADL Assistive Devices  Walker: Rolling  Wheelchair: Standard; Wheelchair Cushion Standard  ADL Assistive Devices: Toileting - Standard Commode; Shower Chair with back;Grab Bars - toilet;Grab Bars - shower; Reacher;Sock-Aid Hard;Long-handled Sponge  Other: Patient would benefit from [facility] [place] due to being confined to room at night and to provide support with toileting/transfers. Without a BSC pt is at a higher risk of falls. Safety Devices  Safety Devices in place: Yes  Type of devices: All fall risk precautions in place; Patient at risk for falls;Gait belt;Left in bed;Call light within reach; Bed alarm in place;Nurse notified  Restraints  Initially in place: No    Patient Education  Education  Education Given To: Patient  Education Provided: Role of Therapy; ADL Function;Plan of Care;Equipment;DME/Home Modifications;Transfer Training;Precautions; Safety; Energy Conservation; Fall Prevention Strategies  Education Provided Comments: spinal precautions, pursed lip breathing, energy conservation, rest breaks, safety with ADLs  Education Method: Verbal;Demonstration  Barriers to Learning: None  Education Outcome: Verbalized understanding;Demonstrated understanding;Continued education needed    Plan  Plan  Times per Week: 5 out of 7 days  Times per Day: Daily  Plan Weeks: 10 days  Current Treatment Recommendations: Strengthening;ROM;Balance training;Functional mobility training; Endurance training;Gait training;Neuromuscular re-education; Safety education & training;Pain management;Patient/Caregiver education & training;Equipment evaluation, education, & procurement; Modalities; Positioning;Self-Care / ADL; Home management training;Cognitive/Perceptual training;Coordination training    Goals  Patient Goals   Patient goals :  \"To go home and be out of pain\"  Short Term Goals  Time Frame for Short term goals: 5 days (8/21/22)  Short Term Goal 1: Pt will complete standing task x3 minutes SBA with LRAD-GOAL MET 8/23  Short Term Goal 2: Pt will complete toileting/transfer CGA with LRAD and DME PRN- EXTEND TO DC DATE -GOAL 8/29  Short Term Goal 3: Pt will complete LE dressing Valeria using AE and LRAD PRN-GOAL MET 8/22  Long Term Goals  Time Frame for Long term goals : 10 days (8/26/22) - d/c date extended to 8/31  Long Term Goal 1: Pt will complete functional mobility in room/bathroom Rachael with LRAD for ADL management- GOAL MET 9/01  Long Term Goal 2: Pt will complete IADL task standing vs sitting Rachael with LRAD-GOAL MET 8/31 sitting to complete folding clothing Rachael  Long Term Goal 3: Pt will complete UE/LE bathing/dressing including donning/doffing brace (patient vs family education teachback) setup vs Rachael sitting vs standing with AE vs LRAD-GOAL MET 8/31, setup UE bathing/dressing including brace management  Long Term Goal 4: Pt will complete toileting/transfer with LRAD and DME PRN Rachael- Goal partially met at supervision for safety 9/01      Therapy Time   Individual Concurrent Group Co-treatment   Time

## 2022-09-01 NOTE — PLAN OF CARE
Problem: Discharge Planning  Goal: Discharge to home or other facility with appropriate resources  Outcome: Progressing     Problem: Pain  Goal: Verbalizes/displays adequate comfort level or baseline comfort level  Outcome: Progressing     Problem: Skin/Tissue Integrity  Goal: Absence of new skin breakdown  Description: 1. Monitor for areas of redness and/or skin breakdown   Every 4-6 hours minimum:  Change oxygen saturation probe site  4. Every 4-6 hours:  If on nasal continuous positive airway pressure, respiratory therapy assess nares and determine need for appliance change or resting period.   Problem: Safety - Adult  Goal: Free from fall injury  Outcome: Progressing     Problem: ABCDS Injury Assessment  Goal: Absence of physical injury  Outcome: Progressing     Problem: Nutrition Deficit:  Goal: Optimize nutritional status  Outcome: Progressing

## 2022-09-01 NOTE — PROGRESS NOTES
Speech Language Pathology  [facility]: ACUTE REHAB UNIT  SPEECH-LANGUAGE PATHOLOGY      [x] Daily  [] Weekly Care Conference Note  [x] Discharge    Earlene Bing E [place]  ACN:[id number]  Rehab Dx/Hx: H/O Spinal surgery [Z98.890]    Precautions: falls  Home situation: lives with , dtr manages meds, shares finances with ,  does the cooking, cleaning, laundry, yardwork, not an active    ST Dx: [] Aphasia  [] Dysarthria  [] Apraxia   [x] Oropharyngeal dysphagia [x] Cognitive Impairment  [] Other:   Date of Admit: 8/16/2022  Room #: 0162/0162-01    Current functional status (updated daily):         Pt being seen for : [x] Speech/Language Treatment  [] Dysphagia Treatment [x] Cognitive Treatment  [] Other:  Communication: [x]WFL  [] Aphasia  [] Dysarthria  [] Apraxia  [] Pragmatic Impairment [] Non-verbal  [] Hearing Loss  [] Other:   Cognition: [] WFL  [x] Mild  [] Moderate  [] Severe [] Unable to Assess  [] Other:  Memory: [] WFL  [x] Mild  [] Moderate  [] Severe [] Unable to Assess  [] Other:  Behavior: [x] Alert  [x] Cooperative  [x]  Pleasant  [] Confused  [] Agitated  [] Uncooperative  [] Distractible [] Motivated  [] Self-Limiting [] Anxious  [] Other:  Endurance:  [] Adequate for participation in SLP sessions  [x] Reduced overall  [x] Lethargic  [] Other:  Safety: [x] No concerns at this time  [] Reduced insight into deficits  []  Reduced safety awareness [] Not following call light procedures  [] Unable to Assess  [] Other:    Current Diet Order:ADULT DIET;  Regular  ADULT ORAL NUTRITION SUPPLEMENT; Breakfast, Dinner; Standard High Calorie/High Protein Oral Supplement  Swallowing Precautions: upright for all intake, stay upright for at least 30 mins after intake, small bites/sips, alternate bites/sips, slow rate of intake, general GERD precautions, and general aspiration precautions        Date: 9/1/2022      Tx session 1  [id number]   DISCHARGE SUMMARY   Total Timed Code Min 15    Total Treatment Minutes 30    Individual Treatment Minutes 30    Group Treatment Minutes 0    Co-Treat Minutes 0    Variance/Reason:  0    Pain Denies     Pain Intervention [] RN notified  [] Repositioned  [] Intervention offered and patient declined  [x] N/A  [] Other:    [] RN notified  [] Repositioned  [] Intervention offered and patient declined  [] N/A  [] Other:   Subjective     Pt seen sitting on edge of bed. Pt cooperative and agreeable to participate. Objective:  Goals           Goal met 08/22/22. Pt tolerating IDDSI level 7 regular solids, thin liquids, meds whole with water 1 pill at a time as LRD. Goal met 08/22/22. Pt tolerating IDDSI level 7 regular solids, thin liquids, meds whole with water 1 pill at a time as LRD. Goal met 08/22/22. Goal met 08/22/22. Cog Goals:  Short-term Goals  Timeframe for Short-term *goals extended through 09/02/22*    Goal 1: Pt will complete graded recall tasks using compensatory strategies with 90% acc given min cues    Did not target. Goal met. Pt overall improvement to 100% for last 3-4 tx sessions. Goal 2: Pt will complete executive function tasks (e.g. money, time, etc) with 90% acc given min cues      Did not target. Goal met. Pt will cont completing finances with  and dtr will manage meds. Goal 3: Pt will complete higher level critical thinking tasks with 90% acc given min cues   Deductive reasoning puzzle  -100% acc indep Goal met   Goal 4: Pt will complete respiratory retraining exercises to improve overall breath support with speech 10/10 given min cues     -exhale while sustaining the sound /sh/: x10  -exhale while sustaining the sound /f/: x10  -exhale while sustaining the sound /z/: x10  -produce pulse of air using the /ha/ sniff in through nose with mouth closed  -diaphragmatic breathing: x20   Goal met. Pt requires occasional cues for slow rate during exercises.     Other areas targeted: N/A    Education:   Edu

## 2022-09-01 NOTE — PROGRESS NOTES
Physical Therapy  Discharge Summary    Name:Armin Obando  NFJ:[id number]  :1955  Treatment Diagnosis: impaired mobility and strength  Diagnosis: L1 burst fx s/p T11-L3 PDFF with L1 corpectomy,     Restrictions/Precautions  Restrictions/Precautions: General Precautions, Fall Risk, Up as Tolerated           Position Activity Restriction  Spinal Precautions: No Bending, No Twisting, No Lifting  Spinal Precautions: TLSO when OOB, can jamila EOB; no lifting >10 lbs  Other position/activity restrictions: 4-6L O2 (initially 4L at rest, increasing to 6L with activity), TLSO, spinal precautions, ok to shower. Goals:                  Short Term Goals  Time Frame for Short term goals: 5 days- 22  Short term goal 1: Pt will complete bed mobility with SBA, with min cues for log roll technique -- : GOAL PARTIALLY MET SBA with bed rails and cues for log roll   GOAL MET. Short term goal 2: Pt will complete functional transfers with CGA consistently, using LRAD -- : GOAL PARTIALLY MET pt can transfer at [facility], but after an hour of PT requires MIN A due to fatigue.  GOAL MET.   Short term goal 3: Pt will ambulate 25ft with min A using RW --  GOAL MET            Long Term Goals  Time Frame for Long term goals : 10 days- 22-- goals extended to  based on updated d/c date  Long term goal 1: Pt will complete bed mobility with mod I without cues for log roll technique--  Goal Met  Long term goal 2: Pt will complete functional transfers with mod I using LRAD--  Goal Met  Long term goal 3: Pt will ambulate 50ft with supervision using LRAD-- GOAL MET , pt ambulates up to 100ft with supervision using RW  Long term goal 4: Pt will complete car transfer with supervision using LRAD--  Goal Met  Long term goal 5: Pt will verbalize 3/3 spinal precautions without cues and adhere to precautions with mobility without cues-- GOAL MET , pt able to verbalize 3/3 spinal precautions without cues    Pt. Met 3/3 short term goals and 5/5 long term goals. Pt. Currently ambulates 100 feet with supervision using RW  Up/down 1 steps with CGA using B HR  Sit to/from stand with mod I  Bed mobility with mod I  Recommend HHPT after d/c in order to continue to progress strength, activity tolerance, and independence with functional mobility  Pt. Safe to return home with 24 hr assistance from family. Pt to d/c to daughter's home and states she has no concerns regarding mobility and safety upon d/c home tomorrow. Provided pt. with LE seated HEP handout. Pt verbalizes and demo's good understanding.     Electronically signed by Zee Yusuf PT on 9/1/2022 at 2:33 PM

## 2022-09-01 NOTE — PROGRESS NOTES
Physical Therapy  Facility/Department: [facility]  Rehabilitation Physical Therapy Treatment Note    NAME: Thi Obando  : 1955 (73 y.o.)  MRN: [id number]  CODE STATUS: Full Code    Date of Service: 22       Restrictions:  Restrictions/Precautions: General Precautions; Fall Risk;Up as Tolerated  Position Activity Restriction  Spinal Precautions: No Bending; No Twisting; No Lifting  Spinal Precautions: TLSO when OOB, can jamila EOB; no lifting >10 lbs  Other position/activity restrictions: 4-6L O2 (initially 4L at rest, increasing to 6L with activity), TLSO, spinal precautions, ok to shower. SUBJECTIVE  Subjective  Subjective: Pt seated EOB upon arrival and agreeable to PT session this afternoon. Reports having no concerns or questions regarding mobility and safety upon d/c home tomorrow with family  Pain: Pt initially denies pain, however reports 3/10 back pain with mobility            OBJECTIVE  Cognition  Overall Cognitive Status: Exceptions  Arousal/Alertness: Appropriate responses to stimuli  Following Commands: Follows all commands without difficulty; Follows multistep commands with repitition  Attention Span: Appears intact  Memory: Appears intact  Safety Judgement: Decreased awareness of need for safety  Problem Solving: Assistance required to identify errors made;Assistance required to correct errors made;Assistance required to implement solutions;Assistance required to generate solutions  Insights: Decreased awareness of deficits  Initiation: Does not require cues  Sequencing: Does not require cues  Orientation  Overall Orientation Status: Within Normal Limits  Orientation Level: Oriented X4    Functional Mobility  Roll Left  Assistance Level: Independent  Roll Right  Assistance Level: Independent  Sit to Supine  Assistance Level: Independent  Skilled Clinical Factors: HOB flat, use of BR, increased time to complete with log roll technique.   Supine to Sit  Assistance Level: Independent  Skilled Clinical Factors: HOB flat, use of BR, and increased time to complete with log roll technique  Scooting  Assistance Level: Independent  Skilled Clinical Factors: to scoot hips to EOB  Transfers  Surface: From bed; Wheelchair  Additional Factors: Increased time to complete  Device: Walker (RW)  Sit to Stand  Assistance Level: Modified independent (up to RW)  Skilled Clinical Factors: Pt completes multiple t/f throughout session with RW with grossly mod I, with requires increased time up to RW  Stand to Sit  Assistance Level: Modified independent  Bed To/From Chair  Technique: Stand pivot  Assistance Level: Modified independent  Skilled Clinical Factors: using RW, w/c>EOB at end of session  Car Transfer  Assistance Level: Supervision  Skilled Clinical Factors: With RW, increased time to complete. Environmental Mobility  Ambulation  Surface: Level surface  Device: Rolling walker  Distance: 102ft + 60ft  Activity: Within Room; Within Unit  Activity Comments: distances limited by SOB and fatigue. Pt on 6L O2 with ambulation. O2 sats decrease to 78% after each bout, however recover to 90% after ~2 mins seated rest with PLB. Pt requires ~5 mins rest break after each bout prior to feeling ready for next activity d/t SOB and fatigue. Additional Factors: Increased time to complete  Assistance Level: Supervision  Gait Deviations: Slow mike;Decreased step length bilateral;Decreased weight shift bilateral;Decreased heel strike right;Decreased heel strike left; Unsteady gait; Wide base of support  Skilled Clinical Factors: Pt with good awareness of need for seated rest breaks. Requires ~5 mins seated rest break after each bout d/t fatigue and SOB  Stairs  Stair Height: 6''  Device: Bilateral handrails  Number of Stairs: 1  Assistance Level: Contact guard assist  Skilled Clinical Factors: pt completes 1 6\" step with B HR with grossly CGA.  Pt reports daughter's home has 1 NGHIA with rails and she will be d/c'ing to her daughter's home tomorrow  Wheelchair  Surface: Level surface  Device: Standard wheelchair  Additional Factors: Increased time to complete  Assistance Level for Propulsion: Modified independent  Propulsion Method: Bilateral upper extremities; Bilateral lower extremities  Propulsion Quality: Slow velocity; Short strokes  Propulsion Distance: 180ft             PT Exercises  Exercise Treatment: Seated BLE exercises (1.5#): ankle pumps x 20, LAQ x 20, seated marching x 20, glute sets x 20, resisted knee flexion x 20 (red TB) (Issued pt LE seated HEP handout for completion at home; pt verbalizes good understanding)      ASSESSMENT/PROGRESS TOWARDS GOALS  Vital Signs  Heart Rate: 83  Heart Rate Source: Monitor  BP: (!) 120/56  BP Location: Right upper arm  MAP (Calculated): 77.33  SpO2: 93 %  O2 Device: Nasal cannula (6L)    Assessment  Assessment: Pt demo's good progress with functional mobility since admission. Able to complete bed mobility with mod I, functional transfers with mod I, ambulate up to 100ft with supervision using RW, and complete 1 step with CGA using B HR. Pt does fatigue quickly with activity and requires multiple therapeutic rest breaks during session, however pt with good awareness of energy conservation and need for rest breaks. Pt on 6L O2 throughotu session, with O2 sats decreasing to 78% with ambulation. Requires ~2 mins to recover to 90%. Pt expresses motivation to d/c home tomorrow and states she has no concerns at this time. Continue to recommend 24 hr assist and HHPT upon d/c. Activity Tolerance: Patient tolerated treatment well;Patient limited by fatigue;Patient limited by endurance  Discharge Recommendations: 24 hour supervision or assist;Home with Home health PT  PT Equipment Recommendations  Equipment Needed: Yes  Mobility Devices: [place]; [facility] Bed  Walker: [place] [facility] Bed : Bed Rails - Partial;Electric - Semi (Head of Bed)  Other: Pt has w/c at home.  Pt requesting hospital bed for daughter's home as she has difficulty with laying flat to sleep    Goals  Patient Goals   Patient goals : Smiley May be able to walk straight\"  Short Term Goals  Time Frame for Short term goals: 5 days- 8/20/22  Short term goal 1: Pt will complete bed mobility with SBA, with min cues for log roll technique -- 8/20: GOAL PARTIALLY MET SBA with bed rails and cues for log roll  8/30 GOAL MET. Short term goal 2: Pt will complete functional transfers with CGA consistently, using LRAD -- 8/23: GOAL PARTIALLY MET pt can transfer at [facility], but after an hour of PT requires MIN A due to fatigue. 8/30 GOAL MET. Short term goal 3: Pt will ambulate 25ft with min A using RW -- 8/23 GOAL MET  Long Term Goals  Time Frame for Long term goals : 10 days- 8/26/22-- goals extended to 9/2 based on updated d/c date  Long term goal 1: Pt will complete bed mobility with mod I without cues for log roll technique-- 8/30 Goal Met  Long term goal 2: Pt will complete functional transfers with mod I using LRAD-- 8/30 Goal Met  Long term goal 3: Pt will ambulate 50ft with supervision using LRAD-- GOAL MET 9/1, pt ambulates up to 100ft with supervision using RW  Long term goal 4: Pt will complete car transfer with supervision using LRAD-- 8/30 Goal Met  Long term goal 5: Pt will verbalize 3/3 spinal precautions without cues and adhere to precautions with mobility without cues-- GOAL MET 9/1, pt able to verbalize 3/3 spinal precautions without cues    PLAN OF CARE/SAFETY  Plan  Plan: 5-7 times per week  Plan weeks: 10 days  Current Treatment Recommendations: Strengthening;Balance training;Functional mobility training;Transfer training; Endurance training; Wheelchair mobility training;Gait training;Stair training;Equipment evaluation, education, & procurement; Neuromuscular re-education;Home exercise program;Safety education & training;Patient/Caregiver education & training;Positioning  Safety Devices  Type of Devices:  All fall risk precautions in place; Bed alarm in place;Call light within reach; Patient at risk for falls; Left in bed;Nurse notified    EDUCATION  Education  Education Given To: Patient  Education Provided: Role of Therapy;Plan of Care;Family Education;Mobility Training;Equipment;Precautions;Transfer Training; Safety; Energy Conservation; Fall Prevention Strategies;DME/Home Modifications  Education Provided Comments: Educated pt on importance of OOB mobility, spinal px and use of TLSO, functional mobility and positional changes with back pain, gait, LE exercises, transfers, d/c recs, safety  Education Method: Demonstration;Verbal  Barriers to Learning: None  Education Outcome: Verbalized understanding;Demonstrated understanding        Therapy Time   Individual Concurrent Group Co-treatment   Time In 1300         Time Out 1400         Minutes 60           Timed Code Treatment Minutes: 1000 Kahlil Alexander, PT, DPT 09/01/22 at 2:32 PM

## 2022-09-01 NOTE — CARE COORDINATION
ARU Team Conference       Planned Discharge Date: 09/01/2022  Durable medical equipment needed: Rolling walker, shower chair with back, and bedside commode. Referral to [facility]. Discharge Plan: CM spoke with patient at bedside. CM discussed discharge date, home care services and durable medical equipment for discharge to daughters home. CM updated daughters address under emergency contact. Patient was asking about a hospital bed at this time. Writer will speak to Gretchen Perez with [facility] to see if this is an option. Patient agrees with DCP.      Amara Hughes RN

## 2022-09-01 NOTE — PROGRESS NOTES
Slime FarrisSalgado  9/1/2022  [id number]    Chief Complaint: H/O Spinal surgery    Subjective:   No acute events overnight. Today Abby Cota is seen in her room. She reports feeling improved and ready for discharge home tomorrow. It is explained to the patient that she does not qualify for a hospital bed. She is independent for supine to sit and modified independent for bed to chair. ROS: denies f/c, n/v, cp, sob    Objective:  Patient Vitals for the past 24 hrs:   BP Temp Temp src Pulse Resp SpO2 Weight   09/01/22 1523 -- -- -- -- 18 -- --   09/01/22 1423 (!) 120/56 -- -- 83 -- 93 % --   09/01/22 1052 -- -- -- -- 18 -- --   09/01/22 0932 138/88 -- -- (!) 114 -- 91 % --   09/01/22 0730 126/62 98.2 °F (36.8 °C) Oral 92 20 100 % --   09/01/22 0722 -- -- -- -- -- -- 117 lb 8 oz (53.3 kg)   08/31/22 2039 105/60 98 °F (36.7 °C) Oral 87 20 97 % --   08/31/22 2007 -- -- -- -- -- 97 % --     Gen: No distress, pleasant. Seated on edge of bed, kyphotic posture  HEENT: Normocephalic, atraumatic. CV: extremities well perfused  Resp: No respiratory distress. On oxygen via nasal cannula  Abd: Soft, nondistended  Ext: No edema. Neuro: Alert, oriented, appropriately interactive.    Psych: tearful    Wt Readings from Last 3 Encounters:   09/01/22 117 lb 8 oz (53.3 kg)   08/08/22 132 lb (59.9 kg)   08/06/22 132 lb (59.9 kg)       Laboratory data:   Lab Results   Component Value Date    WBC 5.8 08/29/2022    HGB 8.4 (L) 08/29/2022    HCT 26.2 (L) 08/29/2022    MCV 86.4 08/29/2022     08/29/2022       Lab Results   Component Value Date/Time     08/31/2022 06:31 AM    K 4.4 08/31/2022 06:31 AM    CL 99 08/31/2022 06:31 AM    CO2 33 08/31/2022 06:31 AM    BUN 8 08/31/2022 06:31 AM    CREATININE <0.5 08/31/2022 06:31 AM    GLUCOSE 121 08/31/2022 06:31 AM    CALCIUM 9.5 08/31/2022 06:31 AM        Therapy progress:  PT  Position Activity Restriction  Spinal Precautions: No Bending, No Twisting, No Lifting  Spinal Precautions: TLSO when OOB, can jamila EOB; no lifting >10 lbs  Other position/activity restrictions: 4-6L O2 (initially 4L at rest, increasing to 6L with activity), TLSO, spinal precautions, ok to shower. Objective     Sit to Stand: Minimal Assistance (up to RW with increased time)  Stand to sit: Minimal Assistance  Bed to Chair: Minimal assistance, Moderate assistance (using RW via stand pivot transfer)     OT  PT Equipment Recommendations  Equipment Needed: Yes  Mobility Devices: [place] Bed  Walker: [facility] Bed : Bed Rails - Partial, Electric - Semi (Head of Bed)  Other: Pt has w/c at home. Pt requesting hospital bed for daughter's home as she has difficulty with laying flat to sleep  Toilet - Technique: Stand step, To left, To right (grab bar vs RW)  Equipment Used: Standard toilet  Toilet Transfers Comments: assist with lifting and lowering, would benefit from [facility] over toilet with RW vs grab bar for support  Assessment        SLP   Pt pleasant and cooperative, agreeable to participate. Pt completed repeat SLUMS scoring a 28/30 today compared to initial evaluation score of 26/30. Pt completed five step written sequencing task with 90% acc indep improving to 100% acc given min cues. Pt does demonstrate min difficulty with higher level executive functioning tasks (but has assist from  and dtr at home with meds/finances). Pt is making consistent progress towards goals and remains motivated to improve. Continue goals above. Body mass index is 21.49 kg/m². Assessment and Plan: Peri Caceres is a 69year old female with a past medical history significant for chronic respiratory failure on 3 L home O2, ILD, COPD, paroxysmal atrial fibrillation, DM2, HTN, HLD, and thyroid disease who presented to  on 8/11/22 for planned T11-L3 PDFF with L1 corpectomy for L1 burst fracture. She was admitted to [facility] on 8/16/22 due to functional deficits below her baseline.      L1 Burst Fracture  - s/p T11-L3 PDFF with L1 corpectomy  - TLSO when OOB  - pain control  - staples removed 8/24  - incision open to air  - PT, OT     Chronic Respiratory Failure  ILD/COPD  - on 3 L home O2 at baseline  - now with increased oxygen requirement  - XR chest negative for acute process  - pifenidone TID, has not been regularly taking  - scheduled duo-nebs  - Pulm consulted, appreciate assistance. No acute process   - 3 L at rest and ok to be up to 6 L with activity  - humidified air     Large Bleeding hemorrhoids  - colonoscopy 8/25  - hydrocortisone suppositories per GI  - outpatient follow up    Colon polyp  - removed on colonoscopy 8/25  - follow up with GI OP    Hypokalemia, resolved  - resume daily supplement  - continue to monitor intermittently      pAF  - not been on [facility]  - monitor     DM2  - on januvia and jardiance at home, family brought in home meds  - glucose has been low due to poor PO intake, hold off on resuming home meds. Would not resume on discharge     Hypothyroidism  - synthroid     GERD  - PPI  - PRN omeprazole per home regimen     Anxiety/Depression  - zoloft, mirtazipine     HLD  - statin      Bowels: Schedule stool softener. Follow bowel movements. Enema or suppository if needed. Bladder: Check PVR x 3. [facility] if PVR > 200ml or if any volume is > 500 ml. Sleep: Trazodone provided prn. Follow up appointments: Ortho Spine, PCP, Pulm    Services: home health PT, OT, ST, nursing  EDOD: 9/2/22    Nargis Monique.  Natalie Gomez MD 9/1/2022, 6:56 PM

## 2022-09-02 VITALS
OXYGEN SATURATION: 98 % | BODY MASS INDEX: 21.62 KG/M2 | HEIGHT: 62 IN | HEART RATE: 109 BPM | DIASTOLIC BLOOD PRESSURE: 62 MMHG | WEIGHT: 117.5 LBS | RESPIRATION RATE: 32 BRPM | SYSTOLIC BLOOD PRESSURE: 119 MMHG | TEMPERATURE: 97.5 F

## 2022-09-02 PROCEDURE — 6370000000 HC RX 637 (ALT 250 FOR IP): Performed by: STUDENT IN AN ORGANIZED HEALTH CARE EDUCATION/TRAINING PROGRAM

## 2022-09-02 PROCEDURE — 94761 N-INVAS EAR/PLS OXIMETRY MLT: CPT

## 2022-09-02 PROCEDURE — 6360000002 HC RX W HCPCS: Performed by: STUDENT IN AN ORGANIZED HEALTH CARE EDUCATION/TRAINING PROGRAM

## 2022-09-02 PROCEDURE — 94640 AIRWAY INHALATION TREATMENT: CPT

## 2022-09-02 PROCEDURE — 2700000000 HC OXYGEN THERAPY PER DAY

## 2022-09-02 RX ORDER — HYDROCORTISONE ACETATE 25 MG/1
25 SUPPOSITORY RECTAL 2 TIMES DAILY
Qty: 20 SUPPOSITORY | Refills: 0 | Status: ON HOLD | OUTPATIENT
Start: 2022-09-02 | End: 2022-09-23 | Stop reason: HOSPADM

## 2022-09-02 RX ADMIN — PANTOPRAZOLE SODIUM 40 MG: 40 TABLET, DELAYED RELEASE ORAL at 05:25

## 2022-09-02 RX ADMIN — ONDANSETRON 4 MG: 4 TABLET, ORALLY DISINTEGRATING ORAL at 08:31

## 2022-09-02 RX ADMIN — HEPARIN SODIUM 5000 UNITS: 5000 INJECTION INTRAVENOUS; SUBCUTANEOUS at 05:25

## 2022-09-02 RX ADMIN — IPRATROPIUM BROMIDE AND ALBUTEROL SULFATE 1 AMPULE: 2.5; .5 SOLUTION RESPIRATORY (INHALATION) at 12:13

## 2022-09-02 RX ADMIN — CETIRIZINE HYDROCHLORIDE 10 MG: 10 TABLET, FILM COATED ORAL at 09:28

## 2022-09-02 RX ADMIN — NADOLOL 20 MG: 20 TABLET ORAL at 09:28

## 2022-09-02 RX ADMIN — OXYCODONE AND ACETAMINOPHEN 2 TABLET: 5; 325 TABLET ORAL at 10:30

## 2022-09-02 RX ADMIN — METHOCARBAMOL TABLETS 750 MG: 750 TABLET, COATED ORAL at 09:28

## 2022-09-02 RX ADMIN — IPRATROPIUM BROMIDE AND ALBUTEROL SULFATE 1 AMPULE: 2.5; .5 SOLUTION RESPIRATORY (INHALATION) at 07:58

## 2022-09-02 RX ADMIN — LEVOTHYROXINE SODIUM 125 MCG: 0.12 TABLET ORAL at 05:25

## 2022-09-02 ASSESSMENT — PAIN SCALES - GENERAL
PAINLEVEL_OUTOF10: 0
PAINLEVEL_OUTOF10: 7
PAINLEVEL_OUTOF10: 0
PAINLEVEL_OUTOF10: 6

## 2022-09-02 ASSESSMENT — PAIN DESCRIPTION - DESCRIPTORS
DESCRIPTORS: ACHING;THROBBING
DESCRIPTORS: ACHING;THROBBING

## 2022-09-02 ASSESSMENT — PAIN DESCRIPTION - LOCATION
LOCATION: BACK;GROIN
LOCATION: BACK

## 2022-09-02 ASSESSMENT — PAIN DESCRIPTION - ORIENTATION
ORIENTATION: MID;RIGHT
ORIENTATION: LOWER

## 2022-09-02 NOTE — CARE COORDINATION
Case Management Discharge Summary  [facility] - Acute Rehab Unit    Patient:Armin Obando     Rehab Dx/Hx: H/O Spinal surgery [Z98.890]       Today's Date: 9/2/2022    Discharge to:  Dtr home [address] [facility] [address]   Pre-certification completed:  [] No       [] Yes     [x] N/A   Hospital Exemption Notification (Mary Beth Honeycutt) completed:  [] No       [] Yes     [x] N/A   IMM given:  09/02/2022       New Durable Medical Equipment ordered/agency:  [employer] providing RW, bedside commode, and shower chair with back, Patient active with [employer] for 02, sent updated clinical/testing/orders re increased 02 needs from baseline, and request for hospital bed pending Patient and family aware    Transportation:  Medical Transport explained to pt/family. Pt/family voice no agency preference. Agency used: [facility] home via private care Patient reports spouse will  and bringing portable tank         Confirmed discharge plan with:  Patient: [] No       [x] Yes  Family:  [] No       [x] Yes      Name:Contact number: Yoly Ruth dtr via phone   Agency, name: [address] [place] [address] faxed       Has lack of transportation kept you from medical appointments, meetings, work, or ADL's? [] Yes, it has kept me from medical appointments or from getting my meds  [] Yes, It has kept me from non-medical meetings, appointments, work, or getting things I need  [x] No  [] Pt unable to respond  [] Pt declines to respond     How often do you need to have someone help you when you read instructions, pamphlets, or other written material from your doctor or pharmacy? [] Never                             [] Always  [] Rarely                            [] Patient declines to respond  [x] Sometimes                    [] Patient unable to respond  [] Often       Note: Discharging nurse to complete CAROLANN, reconcile AVS, and place final copy with patient's discharge packet.            Signature:  AZAEL Foss

## 2022-09-02 NOTE — PLAN OF CARE
Problem: Discharge Planning  Goal: Discharge to home or other facility with appropriate resources  9/2/2022 1108 by Chinedu Bernal RN  Outcome: Completed  9/2/2022 1108 by Chinedu Bernal RN  Outcome: Progressing     Problem: Pain  Goal: Verbalizes/displays adequate comfort level or baseline comfort level  9/2/2022 1108 by Chinedu Bernal RN  Outcome: Completed  9/2/2022 1108 by Chinedu Bernal RN  Outcome: Progressing  9/1/2022 2331 by Jo Ann Shukla RN  Outcome: Progressing     Problem: Skin/Tissue Integrity  Goal: Absence of new skin breakdown  Description: 1. Monitor for areas of redness and/or skin breakdown  2. Assess vascular access sites hourly  3. Every 4-6 hours minimum:  Change oxygen saturation probe site  4. Every 4-6 hours:  If on nasal continuous positive airway pressure, respiratory therapy assess nares and determine need for appliance change or resting period.   9/2/2022 1108 by Chinedu Bernal RN  Outcome: Completed  9/2/2022 1108 by Chinedu Bernal RN  Outcome: Progressing     Problem: Safety - Adult  Goal: Free from fall injury  9/2/2022 1108 by Chinedu Bernal RN  Outcome: Completed  9/2/2022 1108 by Chinedu Bernal RN  Outcome: Progressing     Problem: ABCDS Injury Assessment  Goal: Absence of physical injury  9/2/2022 1108 by Chinedu Bernal RN  Outcome: Completed  9/2/2022 1108 by Chinedu Bernal RN  Outcome: Progressing     Problem: Nutrition Deficit:  Goal: Optimize nutritional status  9/2/2022 1108 by Chinedu Bernal RN  Outcome: Completed  9/2/2022 1108 by Chinedu Bernal RN  Outcome: Progressing     Problem: Chronic Conditions and Co-morbidities  Goal: Patient's chronic conditions and co-morbidity symptoms are monitored and maintained or improved  9/2/2022 1108 by Chinedu Bernal RN  Outcome: Completed  9/2/2022 1108 by Chinedu Bernal RN  Outcome: Progressing

## 2022-09-02 NOTE — PROGRESS NOTES
ACUTE REHAB UNIT: [address] [facility] [address] [facility]     Rehab Dx/Hx: H/O Spinal surgery [G31.030]   Vargas James  YCT:[id number]  Date of Admit: 8/16/2022   Date of Discharge: 9/2/2022    Subjective:   Order for patient discharge. Reviewed discharge summary (AVS) with patient and ___spouse____ including medications, physical instructions (safety) and diet. Pt in stable condition. Reconciled Medication List - Patient:   Medications were reviewed by RN at time of discharge with patient and/or family:  []  Via EMR   []  Via health information exchange  [x]  Verbal (e.g. in person, telephone, video conferencing)  [x]  Paper-based (e.g. fax, copies, printouts)   []  Other Methods (e.g. texting, email, CDs)    Reconciled Medication List - Subsequent provider:   [] No, current reconciled medication list not provided to the subsequent provider. [x] Yes, current reconciled medication list provided to the subsequent provider. [] Via EMR    [] Via health information exchange  [] Verbal (e.g. in person, telephone, video conferencing)  [x] Paper-based (e.g. fax, copies, printouts)   [] Other Methods (e.g. texting, email, CDs)    Discharge disposition:  Pt was discharged via:  [x]  Wheelchair  []  Stretcher  []  Safe ambulation and use of assistive device  []  Other:     Pt was discharged to:  [x]  Private car  []  Transportation service to next destination  []  Other:     Discharge destination:  [x]  Home  []  [facility]  []  [address]  []  Other:        Discharge BIMS:  Number of word repeated after first attempt:  []  0. None []  1. One []  2. Two [x]  3. Three    Able to report correct year:  []  0. Missed by >5 years, or no answer  []  1. Missed by 2-5 years  []  2. Missed by 1 year  [x]  3. Correct    Able to report correct month:   []  0. Missed by >1 month, or no answer  []  1. Missed by 6 days to 1 month  [x]  2.  Accurate within 5 days    Able to report correct day of the week:  []  0. Incorrect or no answer  [x]  1. Correct    Recall:   Able to recall \"sock\":  []  0. No could not recall  []  1. Yes, after cueing  [x]  2. Yes, no cue required  Able to recall \"blue\":  []  0. No could not recall  []  1. Yes, after cueing  [x]  2. Yes, no cue required  Able to recall \"bed\":  []  0. No could not recall  []  1. Yes, after cueing  [x]  2. Yes, no cue required      Patient Mood Interview    Symptom Presence  0. No (enter 0 in column 2)  1. Yes (enter 0-3 in column 2)  9. No response (leave column 2 blank  Symptom Frequency  0. Never or 1 day  1. 2-6 days (several days)  2. 7-11 days (half or more days)  3. 12-14 days (nearly everyday) 1. Symptom Presence 2. Symptom Frequency    Enter scores in boxes   Little interest or pleasure in doing things   0 0   Feeling down, depressed, or hopeless   0 0   If either A or B is coded 2 or 3, CONTINUE asking the questions below. If not, END the interview. Trouble falling or staying asleep, or sleeping too much       Feeling tired or having little energy       Poor appetite or overeating       Feeling bad about yourself - or that you are a failure or have let yourself or your family down       Trouble concentrating on things, such as reading the newspaper or watching television       Moving or speaking so slowly that other people could have noticed. Or the opposite- being so fidgety or restless that you have been moving around a lot more than usual.       Thoughts that you would be better off dead, or of hurting yourself in some way. Total Severity: Add scores for all frequency responses in column 2    0   Social isolation (from [employer])  How often do you feel lonely or isolated from those around you? [x] 0. Never  [] 1. Rarely  [] 2. Sometimes  [] 3. Often  [] 4. Always  [] 8. Patient unable to respond.            Discharging Nurse Signature:  Maninder Arshad RN

## 2022-09-02 NOTE — CARE COORDINATION
Spoke with Charmaine Snyder RN re need for walk test re higher 02 needs than base line. Writer also notes PT [facility] bed, MD to place order writer to send with walk test to [facility]. Spoke with neda will provide walker, showerchair, and bedside commode. SW following. Kierra Bowie [facility]

## 2022-09-02 NOTE — PLAN OF CARE
Problem: Pain  Goal: Verbalizes/displays adequate comfort level or baseline comfort level  9/2/2022 1108 by Joesph Waller RN  Outcome: Progressing  9/1/2022 2331 by Eusebia Floyd RN  Outcome: Progressing

## 2022-09-02 NOTE — DISCHARGE SUMMARY
Physical Medicine & Rehabilitation  Discharge Summary     Patient Identification:  Pa Oabndo  : 1955  Admit date: 2022  Discharge date:  22  Attending provider: Kajal Schaefer MD        Primary care provider: Esperanza Landa MD     Discharge Diagnoses:   Patient Active Problem List   Diagnosis    Chest pain    HTN (hypertension)    Hyperlipidemia with target LDL less than 130    Hypokalemia    Insomnia    Trochanteric bursitis of both hips    Migraine    Syncope    Acute blood loss anemia    Fatigue    Hypothyroidism    Mild single current episode of major depressive disorder (HCC)    Anxiety    Pneumonia of both lower lobes due to infectious organism    A-fib ([address]    Atypical pneumonia    Acute bronchitis with bronchospasm    Acute on chronic diastolic CHF (congestive heart failure) ([facility])    Class 2 obesity with body mass index (BMI) of 39.0 to 39.9 in adult    Abnormal chest CT    Acute diastolic heart failure ([facility])    ILD (interstitial lung disease) ([facility])    Acute on chronic respiratory failure with hypoxia ([facility])    Restrictive lung disease    Abnormal CT of the chest    SOB (shortness of breath)    Acute cystitis without hematuria    Shortness of breath    Chronic respiratory failure with hypoxia ([facility])    Cryptogenic organizing pneumonia ([address]    Pneumothorax of left lung after biopsy    COPD (chronic obstructive pulmonary disease) ([facility])    Type 2 diabetes mellitus without complication ([facility])    Hyponatremia    Numbness and tingling in left hand    Ulnar neuropathy at elbow of left upper extremity    Acute congestive heart failure (HCC)    Left wrist pain    Left wrist tendinitis    GERD (gastroesophageal reflux disease)    Toxic metabolic encephalopathy    VIRGINIE (acute kidney injury) ([address]    Lumbar radiculopathy    Acute on chronic respiratory failure (HCC)    Rib pain on left side    Elevated brain natriuretic peptide (BNP) level    Fracture of multiple ribs of left side Interstitial lung disease (HCC)    Hypomagnesemia    Depression    Chronic diastolic congestive heart failure (HCC)    Burst fracture of lumbar vertebra (HCC)    H/O Spinal surgery    Severe malnutrition ([address]       History of Present Illness/Acute Hospital Course: Gretta Bacno is a 69year old female with a past medical history significant for chronic respiratory failure on 3 L home O2, ILD, COPD, paroxysmal atrial fibrillation, DM2, HTN, HLD, and thyroid disease who presented to  on 8/11/22 for planned T11-L3 PDFF with L1 corpectomy for L1 burst fracture. Intraoperatively she was found to have a CSF leak which was repaired. Post-operative course was notable for hypokalemia and pain. She was admitted to [facility] on 8/16/22 due to functional deficits below her baseline. Inpatient Rehabilitation Course: Lionel Koo is a 69 y.o. female admitted to inpatient rehabilitation on 8/16/2022 with H/O Spinal surgery. The patient participated in an aggressive multidisciplinary inpatient rehabilitation program involving 3 hours of therapy per day, at least 5 days per week. She made good progress with regard to ADLs, transfers, ambulation, and speech. Now overall min assist to modified independent. Discharging home with family. Home care services and DME ordered as below. Patient will follow-up with PCP, Neurosurgery, Pulmonology, GI. Impairments: impaired mobility and ADLs, impaired gait and balance    Medical Management:    L1 Burst Fracture  - s/p T11-L3 PDFF with L1 corpectomy  - TLSO when OOB  - pain control  - staples removed 8/24  - incision open to air  - PT, OT     Chronic Respiratory Failure  ILD/COPD  - on 3 L home O2 at baseline  - now with increased oxygen requirement  - XR chest negative for acute process  - pifenidone TID, has not been regularly taking  - scheduled duo-nebs  - Pulm consulted, appreciate assistance.  No acute process   - 3 L at rest and ok to be up to 6 L with activity  - humidified air      Large Bleeding hemorrhoids  - colonoscopy 8/25  - hydrocortisone suppositories per GI for 2 week course  - outpatient follow up     Colon polyp  - removed on colonoscopy 8/25  - follow up with GI OP     Hypokalemia, resolved  - resume daily supplement  - continue to monitor intermittently      pAF  - not been on [facility]  - monitor     DM2  - on januvia and jardiance at home, family brought in home meds  - glucose has been low due to poor PO intake, hold off on resuming home meds. Would not resume on discharge     Hypothyroidism  - synthroid     GERD  - PPI  - PRN omeprazole per home regimen     Anxiety/Depression  - zoloft, mirtazipine     HLD  - statin     Armin Obando was evaluated today and a DME order was entered for a semi-electric hospital bed because she requires assistance for positioning needs not possible in an ordinary bed, complexity of body positioning needs, requirement of elevation of head of bed more than 30 degrees for the diagnosis of chronic respiratory failure for respiratory funciton . Patient requires frequent and immediate positioning changes for relief of pain and care needs related to medical diagnoses. Patient needs variability of bed height requirements to perform patient transfers and for repositioning in bed. Current body Weight: 117 lb 8 oz (53.3 kg). The need for this equipment was discussed with the patient and she understands and is in agreement. Discharge Exam:  Constitutional: Alert, WDWN, Pleasant, no distress  Head: Normocephalic, atruamatic, MMM  Eyes: Conjunctiva noninjected, no icterus, no drainage  Pulm: CTA bilat. Respirations non-labored. CV: No murmurs noted. RRR. Abd: Soft, nontender. NABS+  Ext: No edema, no varicosities  Neuro: Alert, fully oriented, appropriate   MSK: No joint abnormalities noted     Discharge Functional Status:    Physical therapy:  Bed Mobility:  Overall Assistance Level:  Independent  Sit>supine:  Assistance Supervision  Skilled Clinical Factors: drinking water and taking medications sitting in recliner  Grooming/Oral Hygiene  Assistance Level: Modified independent  Skilled Clinical Factors: sitting in WC at sink to complete oral care and hair care, pt with good tolerance of hair dryer use with moving in all directions to dry hair  UE Bathing  Assistance Level: Modified independent  Skilled Clinical Factors: Pt sat on TTB and was able to complete all UE bathing tasks with setup; pt ind turned on water and adjusted to appropriate temp  LE Bathing  Equipment Provided: Long-handled sponge  Assistance Level: Modified independent  Skilled Clinical Factors: sitting on TTB in shower to complete all aspects of LB bathing  UE Dressing  Assistance Level: Minimal assistance  Skilled Clinical Factors: d/t fatigue after toileting OT gathered pt's clothes and placed on walker for pt to transport into bathroom, doffed brace and shirt mod I sitting and donned shirt without assistance; increased time to don brace and Min A thread RUE and secure brace  LE Dressing  Assistance Level: Contact guard assist  Skilled Clinical Factors: CGA to doff pants at toilet and TTB, CGA while in stance for pt to manage clothing over hips, pt self initiated use of grab bars for increased stability and safety  Putting On/Taking Off Footwear  Assistance Level: Modified independent  Skilled Clinical Factors: pt was able to jamila/doff socks and shoes without assistance  Toileting  Assistance Level: Verbal cues, Increased time to complete, Stand by assist  Skilled Clinical Factors: SBA for lynnette care and clothing management after BM/urination  Transfers: Toilet Transfers  Technique:  (ambulating with RW)  Equipment: Standard toilet, Grab bars  Additional Factors: Increased time to complete  Assistance Level: Supervision  Skilled Clinical Factors: PRN cues for O2 line management  Tub/Shower Transfers  Type: Shower  Transfer From: Rolling walker  Transfer To:  Tub transfer bench  Additional Factors: Increased time to complete, With handrails  Assistance Level: Supervision  Skilled Clinical Factors: Supervision for transfer from toilet to TTB walk in shower with RW  IADLs:  Meal Prep     Money Management     Light Housekeeping  Light Housekeeping Level: Rosmery Naran[facility] Housekeeping Level of Assistance: Stand by assistance, Modified independent  Light Housekeeping: SBA standing briefly to attempt to fold laundry, however sitting to complete Rachael. Pt reports sitting to complete folding laundry at home and  assist with placement in closet; pt was able to ambulate to closet and place on shelving with SBA and cues for hand placement  [address] Management     Assessment:      Speech therapy:    Speech Therapy Diagnosis  Cognitive Diagnosis: Mild high level cognitive deficits  Speech Diagnosis: Reduced breath support with phonation         Significant Diagnostics:   Lab Results   Component Value Date    CREATININE <0.5 (L) 08/31/2022    BUN 8 08/31/2022     08/31/2022    K 4.4 08/31/2022    CL 99 08/31/2022    CO2 33 (H) 08/31/2022       Lab Results   Component Value Date    WBC 5.8 08/29/2022    HGB 8.4 (L) 08/29/2022    HCT 26.2 (L) 08/29/2022    MCV 86.4 08/29/2022     08/29/2022       Disposition:  home    Services: home health PT, OT, nursing  DME:  Shower chair with back, BSC, RW, hospital bed    Discharge Condition: Stable    Follow-up:  See after visit summary from hospitalization    Discharge Medications:     Medication List        START taking these medications      magnesium oxide 400 (240 Mg) MG tablet  Commonly known as: MAG-OX  Take 1 tablet by mouth daily     methocarbamol 750 MG tablet  Commonly known as: ROBAXIN  Take 1 tablet by mouth 3 times daily     ondansetron 4 MG disintegrating tablet  Commonly known as: ZOFRAN-ODT  Take 1 tablet by mouth every 4 hours as needed for Nausea or Vomiting (every 4 to 6 hours as needed)     oxyCODONE-acetaminophen 5-325 MG per tablet  Commonly known as: PERCOCET  Take 1 tablet by mouth every 4 hours as needed for Pain for up to 7 days. polycarbophil 625 MG tablet  Commonly known as: FIBERCON  Take 1 tablet by mouth at bedtime     Vitamin D (Ergocalciferol) 98600 units Caps  Take 50,000 Units by mouth once a week  Start taking on: September 4, 2022            CHANGE how you take these medications      potassium chloride 10 MEQ extended release tablet  Commonly known as: KLOR-CON  Take 1 tablet by mouth 2 times daily  What changed:   how much to take  when to take this  reasons to take this            CONTINUE taking these medications      acetaminophen 325 MG tablet  Commonly known as: TYLENOL     * albuterol (2.5 MG/3ML) 0.083% nebulizer solution  Commonly known as: PROVENTIL  INHALE THE CONTENTS OF 1 VIAL VIA NEBULIZER EVERY 6 HOURS AS NEEDED FOR WHEEZING     * albuterol sulfate  (90 Base) MCG/ACT inhaler  Commonly known as: PROVENTIL;VENTOLIN;PROAIR  INHALE 2 PUFFS INTO THE LUNGS EVERY 6 HOURS AS NEEDED FOR WHEEZING     atorvastatin 40 MG tablet  Commonly known as: LIPITOR  Take 1 tablet by mouth daily     benzonatate 200 MG capsule  Commonly known as: TESSALON  TAKE 1 CAPSULE BY MOUTH 3 TIMES A DAY AS NEEDED FOR COUGH     blood glucose test strips strip  Commonly known as: [employer]ENS[employer] AUTODISC VI;ONE TOUCH ULTRA TEST VI  1 each by In Vitro route daily As needed.      [employer] Allergy Relief-D12 5-120 MG per extended release tablet  Generic drug: loratadine-pseudoephedrine  TAKE 1 TABLET BY MOUTH TWICE A DAY     guaiFENesin 600 MG extended release tablet  Commonly known as: MUCINEX     hydrOXYzine HCl 10 MG tablet  Commonly known as: ATARAX  Take 1 tablet by mouth every 8 hours as needed for Itching TAKE 1 TO 3 TABLETS BY MOUTH 3 TIMES DAILY AS NEEDED FOR ITCHING     INSULIN SYRINGE 1CC/29G 29G X 1/2\" 1 ML Misc  1 each by Does not apply route 2 times daily levothyroxine 125 MCG tablet  Commonly known as: SYNTHROID  Take 1 tablet by mouth Daily TAKE 1 TABLET BY MOUTH EVERY DAY     mirtazapine 30 MG tablet  Commonly known as: REMERON  Take 1 tablet by mouth nightly     nadolol 20 MG tablet  Commonly known as: CORGARD  Take 1 tablet by mouth daily     OXYGEN     pantoprazole 40 MG tablet  Commonly known as: PROTONIX  Take 1 tablet by mouth every morning (before breakfast)     Pirfenidone 267 MG Tabs  Take 3 tablets by mouth in the morning, at noon, and at bedtime     sertraline 100 MG tablet  Commonly known as: ZOLOFT  Take 2 tablets by mouth daily     True Metrix Level 1 Low Soln  use as needed     True Metrix Meter Lindsay  Test daily and as needed     TRUEplus Lancets 33G Misc  Use daily           * This list has 2 medication(s) that are the same as other medications prescribed for you. Read the directions carefully, and ask your doctor or other care provider to review them with you.                 STOP taking these medications      empagliflozin 25 MG tablet  Commonly known as: JARDIANCE     furosemide 40 MG tablet  Commonly known as: LASIX     LORazepam 0.5 MG tablet  Commonly known as: ATIVAN     SITagliptin 100 MG tablet  Commonly known as: Januvia     traMADol 50 MG tablet  Commonly known as: Ultram               Where to Get Your Medications        These medications were sent to [facility]/pharmacy #[id number]- [place], [address], [place] [address]      Phone: [phone]   magnesium oxide 400 (240 Mg) MG tablet  methocarbamol 750 MG tablet  ondansetron 4 MG disintegrating tablet  oxyCODONE-acetaminophen 5-325 MG per tablet  potassium chloride 10 MEQ extended release tablet  Vitamin D (Ergocalciferol) 00531 units Caps       These medications were sent to [facility] (Now [address] [facility]) - Mariusz Vázquez [phone] - F [phone] [address] [facility] [address]      Phone: [phone]   Pirfenidone [address] [facility] can get these medications from any pharmacy    You don't need a prescription for these medications  polycarbophil 625 MG tablet           I spent over 35 minutes on this discharge encounter between counseling, coordination of care, and medication reconciliation. To comply with [address] [facility] by R.II.4.1:   Discharge order placed in advance to facilitate patients discharge needs.       Mehrdad Todd MD

## 2022-09-05 NOTE — CARE COORDINATION
Discharge summaries sent to insurance    Toñito Koch MPH, RRT  Clinical Liaison [address]  E)[phone])[phone]   Electronically signed by Toñito Koch on 9/5/2022 at 10:56 AM

## 2022-09-09 ENCOUNTER — APPOINTMENT (OUTPATIENT)
Dept: CT IMAGING | Age: 67
DRG: 177 | End: 2022-09-09
Payer: MEDICARE

## 2022-09-09 ENCOUNTER — HOSPITAL ENCOUNTER (INPATIENT)
Age: 67
LOS: 13 days | Discharge: HOME | DRG: 177 | End: 2022-09-23
Attending: STUDENT IN AN ORGANIZED HEALTH CARE EDUCATION/TRAINING PROGRAM | Admitting: INTERNAL MEDICINE
Payer: MEDICARE

## 2022-09-09 ENCOUNTER — APPOINTMENT (OUTPATIENT)
Dept: GENERAL RADIOLOGY | Age: 67
DRG: 177 | End: 2022-09-09
Payer: MEDICARE

## 2022-09-09 DIAGNOSIS — E83.42 HYPOMAGNESEMIA: ICD-10-CM

## 2022-09-09 DIAGNOSIS — E87.6 HYPOKALEMIA: ICD-10-CM

## 2022-09-09 DIAGNOSIS — U07.1 COVID-19: ICD-10-CM

## 2022-09-09 DIAGNOSIS — J96.21 ACUTE ON CHRONIC RESPIRATORY FAILURE WITH HYPOXIA (HCC): ICD-10-CM

## 2022-09-09 DIAGNOSIS — J93.11 PRIMARY SPONTANEOUS PNEUMOTHORAX: Primary | ICD-10-CM

## 2022-09-09 LAB
A/G RATIO: 0.8 (ref 1.1–2.2)
ALBUMIN SERPL-MCNC: 2.6 G/DL (ref 3.4–5)
ALP BLD-CCNC: 138 U/L (ref 40–129)
ALT SERPL-CCNC: 9 U/L (ref 10–40)
ANION GAP SERPL CALCULATED.3IONS-SCNC: 8 MMOL/L (ref 3–16)
AST SERPL-CCNC: 17 U/L (ref 15–37)
BASE EXCESS VENOUS: 5.3 MMOL/L (ref -3–3)
BASOPHILS ABSOLUTE: 0.1 K/UL (ref 0–0.2)
BASOPHILS RELATIVE PERCENT: 0.6 %
BILIRUB SERPL-MCNC: 0.3 MG/DL (ref 0–1)
BILIRUBIN URINE: NEGATIVE
BLOOD, URINE: NEGATIVE
BUN BLDV-MCNC: 8 MG/DL (ref 7–20)
CALCIUM SERPL-MCNC: 8.1 MG/DL (ref 8.3–10.6)
CARBOXYHEMOGLOBIN: 2.1 % (ref 0–1.5)
CHLORIDE BLD-SCNC: 98 MMOL/L (ref 99–110)
CLARITY: CLEAR
CO2: 32 MMOL/L (ref 21–32)
COLOR: YELLOW
CREAT SERPL-MCNC: <0.5 MG/DL (ref 0.6–1.2)
EKG ATRIAL RATE: 108 BPM
EKG DIAGNOSIS: NORMAL
EKG P AXIS: 13 DEGREES
EKG P-R INTERVAL: 138 MS
EKG Q-T INTERVAL: 332 MS
EKG QRS DURATION: 76 MS
EKG QTC CALCULATION (BAZETT): 444 MS
EKG R AXIS: -12 DEGREES
EKG T AXIS: -23 DEGREES
EKG VENTRICULAR RATE: 108 BPM
EOSINOPHILS ABSOLUTE: 1.1 K/UL (ref 0–0.6)
EOSINOPHILS RELATIVE PERCENT: 13.8 %
GFR AFRICAN AMERICAN: >60
GFR NON-AFRICAN AMERICAN: >60
GLUCOSE BLD-MCNC: 96 MG/DL (ref 70–99)
GLUCOSE URINE: NEGATIVE MG/DL
HCO3 VENOUS: 31.8 MMOL/L (ref 23–29)
HCT VFR BLD CALC: 27.1 % (ref 36–48)
HEMOGLOBIN: 8.9 G/DL (ref 12–16)
INFLUENZA A: NOT DETECTED
INFLUENZA B: NOT DETECTED
KETONES, URINE: NEGATIVE MG/DL
LACTIC ACID: 1.1 MMOL/L (ref 0.4–2)
LEUKOCYTE ESTERASE, URINE: NEGATIVE
LYMPHOCYTES ABSOLUTE: 1.5 K/UL (ref 1–5.1)
LYMPHOCYTES RELATIVE PERCENT: 17.8 %
MAGNESIUM: 1.6 MG/DL (ref 1.8–2.4)
MCH RBC QN AUTO: 27.4 PG (ref 26–34)
MCHC RBC AUTO-ENTMCNC: 32.8 G/DL (ref 31–36)
MCV RBC AUTO: 83.6 FL (ref 80–100)
METHEMOGLOBIN VENOUS: 0.3 %
MICROSCOPIC EXAMINATION: NORMAL
MONOCYTES ABSOLUTE: 0.6 K/UL (ref 0–1.3)
MONOCYTES RELATIVE PERCENT: 7.4 %
NEUTROPHILS ABSOLUTE: 5 K/UL (ref 1.7–7.7)
NEUTROPHILS RELATIVE PERCENT: 60.4 %
NITRITE, URINE: NEGATIVE
O2 CONTENT, VEN: 11 VOL %
O2 SAT, VEN: 79 %
O2 THERAPY: ABNORMAL
PCO2, VEN: 57 MMHG (ref 40–50)
PDW BLD-RTO: 17.1 % (ref 12.4–15.4)
PH UA: 6 (ref 5–8)
PH VENOUS: 7.36 (ref 7.35–7.45)
PLATELET # BLD: 258 K/UL (ref 135–450)
PMV BLD AUTO: 5.9 FL (ref 5–10.5)
PO2, VEN: 45.8 MMHG (ref 25–40)
POTASSIUM REFLEX MAGNESIUM: 3.1 MMOL/L (ref 3.5–5.1)
PRO-BNP: 610 PG/ML (ref 0–124)
PROTEIN UA: NEGATIVE MG/DL
RBC # BLD: 3.24 M/UL (ref 4–5.2)
SARS-COV-2 RNA, RT PCR: DETECTED
SODIUM BLD-SCNC: 138 MMOL/L (ref 136–145)
SPECIFIC GRAVITY UA: 1.01 (ref 1–1.03)
TCO2 CALC VENOUS: 34 MMOL/L
TOTAL PROTEIN: 5.8 G/DL (ref 6.4–8.2)
TROPONIN: <0.01 NG/ML
URINE REFLEX TO CULTURE: NORMAL
URINE TYPE: NORMAL
UROBILINOGEN, URINE: 0.2 E.U./DL
WBC # BLD: 8.2 K/UL (ref 4–11)

## 2022-09-09 PROCEDURE — 99285 EMERGENCY DEPT VISIT HI MDM: CPT

## 2022-09-09 PROCEDURE — 83036 HEMOGLOBIN GLYCOSYLATED A1C: CPT

## 2022-09-09 PROCEDURE — 6360000004 HC RX CONTRAST MEDICATION: Performed by: STUDENT IN AN ORGANIZED HEALTH CARE EDUCATION/TRAINING PROGRAM

## 2022-09-09 PROCEDURE — 6360000002 HC RX W HCPCS: Performed by: STUDENT IN AN ORGANIZED HEALTH CARE EDUCATION/TRAINING PROGRAM

## 2022-09-09 PROCEDURE — 96366 THER/PROPH/DIAG IV INF ADDON: CPT

## 2022-09-09 PROCEDURE — 83880 ASSAY OF NATRIURETIC PEPTIDE: CPT

## 2022-09-09 PROCEDURE — 71045 X-RAY EXAM CHEST 1 VIEW: CPT

## 2022-09-09 PROCEDURE — 96375 TX/PRO/DX INJ NEW DRUG ADDON: CPT

## 2022-09-09 PROCEDURE — 36415 COLL VENOUS BLD VENIPUNCTURE: CPT

## 2022-09-09 PROCEDURE — 32551 INSERTION OF CHEST TUBE: CPT

## 2022-09-09 PROCEDURE — 93005 ELECTROCARDIOGRAM TRACING: CPT | Performed by: NURSE PRACTITIONER

## 2022-09-09 PROCEDURE — 84484 ASSAY OF TROPONIN QUANT: CPT

## 2022-09-09 PROCEDURE — 83735 ASSAY OF MAGNESIUM: CPT

## 2022-09-09 PROCEDURE — 0W9930Z DRAINAGE OF RIGHT PLEURAL CAVITY WITH DRAINAGE DEVICE, PERCUTANEOUS APPROACH: ICD-10-PCS | Performed by: STUDENT IN AN ORGANIZED HEALTH CARE EDUCATION/TRAINING PROGRAM

## 2022-09-09 PROCEDURE — 96365 THER/PROPH/DIAG IV INF INIT: CPT

## 2022-09-09 PROCEDURE — 80053 COMPREHEN METABOLIC PANEL: CPT

## 2022-09-09 PROCEDURE — 82803 BLOOD GASES ANY COMBINATION: CPT

## 2022-09-09 PROCEDURE — 87040 BLOOD CULTURE FOR BACTERIA: CPT

## 2022-09-09 PROCEDURE — 85025 COMPLETE CBC W/AUTO DIFF WBC: CPT

## 2022-09-09 PROCEDURE — 81003 URINALYSIS AUTO W/O SCOPE: CPT

## 2022-09-09 PROCEDURE — 71260 CT THORAX DX C+: CPT | Performed by: STUDENT IN AN ORGANIZED HEALTH CARE EDUCATION/TRAINING PROGRAM

## 2022-09-09 PROCEDURE — 87636 SARSCOV2 & INF A&B AMP PRB: CPT

## 2022-09-09 PROCEDURE — 83605 ASSAY OF LACTIC ACID: CPT

## 2022-09-09 PROCEDURE — 93010 ELECTROCARDIOGRAM REPORT: CPT | Performed by: INTERNAL MEDICINE

## 2022-09-09 PROCEDURE — 96368 THER/DIAG CONCURRENT INF: CPT

## 2022-09-09 RX ORDER — POTASSIUM CHLORIDE 7.45 MG/ML
10 INJECTION INTRAVENOUS
Status: COMPLETED | OUTPATIENT
Start: 2022-09-09 | End: 2022-09-10

## 2022-09-09 RX ORDER — POTASSIUM CHLORIDE 20 MEQ/1
40 TABLET, EXTENDED RELEASE ORAL ONCE
Status: DISCONTINUED | OUTPATIENT
Start: 2022-09-09 | End: 2022-09-09

## 2022-09-09 RX ORDER — DEXAMETHASONE SODIUM PHOSPHATE 10 MG/ML
6 INJECTION, SOLUTION INTRAMUSCULAR; INTRAVENOUS ONCE
Status: COMPLETED | OUTPATIENT
Start: 2022-09-09 | End: 2022-09-09

## 2022-09-09 RX ORDER — MAGNESIUM SULFATE IN WATER 40 MG/ML
2000 INJECTION, SOLUTION INTRAVENOUS ONCE
Status: COMPLETED | OUTPATIENT
Start: 2022-09-09 | End: 2022-09-09

## 2022-09-09 RX ORDER — MORPHINE SULFATE 4 MG/ML
4 INJECTION, SOLUTION INTRAMUSCULAR; INTRAVENOUS ONCE
Status: COMPLETED | OUTPATIENT
Start: 2022-09-09 | End: 2022-09-09

## 2022-09-09 RX ADMIN — Medication 10 MEQ: at 22:46

## 2022-09-09 RX ADMIN — IOPAMIDOL 85 ML: 755 INJECTION, SOLUTION INTRAVENOUS at 21:06

## 2022-09-09 RX ADMIN — DEXAMETHASONE SODIUM PHOSPHATE 6 MG: 10 INJECTION, SOLUTION INTRAMUSCULAR; INTRAVENOUS at 21:11

## 2022-09-09 RX ADMIN — Medication 10 MEQ: at 21:16

## 2022-09-09 RX ADMIN — MORPHINE SULFATE 4 MG: 4 INJECTION, SOLUTION INTRAMUSCULAR; INTRAVENOUS at 21:10

## 2022-09-09 RX ADMIN — MAGNESIUM SULFATE HEPTAHYDRATE 2000 MG: 2 INJECTION, SOLUTION INTRAVENOUS at 21:19

## 2022-09-09 ASSESSMENT — PAIN - FUNCTIONAL ASSESSMENT: PAIN_FUNCTIONAL_ASSESSMENT: NONE - DENIES PAIN

## 2022-09-09 NOTE — ED PROVIDER NOTES
[facility]        Pt Name: Mackenzie Edward  MRN: [id number]  [place] 1955  Date of evaluation: 9/9/2022  Provider: PANDA Bowie - GISELLA  PCP: Ruby Clark MD  Note Started: 6:20 PM EDT       I have seen and evaluated this patient with my supervising physician No att. providers found. Triage CHIEF COMPLAINT       Chief Complaint   Patient presents with    Shortness of Breath     Pt has a lung disease and on oxygen at home. Has been having to wear 6 liters due to sob for the past few days. States her oxygen has been dropping. 85% on her 6 liters per squad. Placed her on a non rebreather. Pt 100% on no rebreather. Pt does not appear to be in any distress. Talking in full sentences. HISTORY OF PRESENT ILLNESS   (Location/Symptom, Timing/Onset, Context/Setting, Quality, Duration, Modifying Factors, Severity)  Note limiting factors. Chief Complaint: Shortness of breath    Mackenzie Edward is a 69 y.o. female who presents to the emergency department symptoms of shortness of breath. She reported symptoms of breathing difficulty in the last 3 to 5 days. States that she just got out of rehab for surgery of her back. She states she just got a rehab and she was there for strength training. States that she continues to have symptoms of shortness of breath of the last 3 days since her discharge 1 week ago. States that she has a history of COPD, also history of interstitial lung disease as well as respiratory problems where she is on 3 L nasal cannula at all times. Apparently over the last few days when her symptoms continue to worsen she has been increasing her oxygen delivery up to 6 L nasal cannula. Upon EMSs arrival patient was on 6 L nasal cannula with a oxygen saturation of 80%. She reports that and then there placed her on her breathing. Denies any abdominal pain today reports some pain in her chest with coughing.   No pain in her chest at rest.  Cough is produce phlegm . no other acute complaints at this time. Nursing Notes were all reviewed and agreed with or any disagreements were addressed in the HPI. REVIEW OF SYSTEMS    (2-9 systems for level 4, 10 or more for level 5)     Review of Systems   Constitutional:  Negative for chills, diaphoresis and fever. HENT:  Negative for congestion, ear pain, rhinorrhea and sore throat. Eyes:  Negative for pain and visual disturbance. Respiratory:  Positive for cough and shortness of breath. Cardiovascular:  Positive for chest pain. Negative for leg swelling. Gastrointestinal:  Negative for abdominal pain, blood in stool, diarrhea, nausea and vomiting. Genitourinary:  Negative for difficulty urinating, dysuria, flank pain and frequency. Musculoskeletal:  Negative for back pain and neck pain. Skin:  Negative for rash and wound. Neurological:  Negative for dizziness and light-headedness. PAST MEDICAL HISTORY     Past Medical History:   Diagnosis Date    A-fib [facility])     Anxiety     Cryptogenic organizing pneumonia ([facility] [address]     Depression     GERD (gastroesophageal reflux disease)     Hyperlipidemia     On home oxygen therapy     uses O2 3L prn    Rash     Thyroid disease     hypothyroidism       SURGICAL HISTORY     Past Surgical History:   Procedure Laterality Date    ARM SURGERY Left 3/2/2020    LEFT ULNAR NERVE DECOMPRESSION AT THE ELBOW performed by Pilo Cunningham MD at [facility] N/A 6/27/2019    EBUS WF W/ANES.  performed by Glendy Marrero MD at [address] [place] [address]  6/27/2019    BRONCHOSCOPY/TRANSBRONCHIAL NEEDLE BIOPSY performed by Glendy Marrero MD at [address] [place] [address]  6/27/2019    BRONCHOSCOPY/TRANSBRONCHIAL LUNG BIOPSY performed by Glendy Marrero MD at [address] [place] [address]  6/27/2019    BRONCHOSCOPY ALVEOLAR LAVAGE performed by Glendy Marrero MD at [address] [place] [address]  07/10/2019 BRONCHOSCOPY N/A 7/10/2019    BRONCHOSCOPY ALVEOLAR LAVAGE performed by Elier Gray MD at [address] [place] [address] Bilateral 08/06/2019    BRONCHOSCOPY N/A 8/6/2019    BRONCHOSCOPY ALVEOLAR LAVAGE performed by Wendi Guzman MD at [address] [place] [address] N/A 12/24/2019    BRONCHOSCOPY ALVEOLAR LAVAGE performed by Janice Estrada MD at [address] [place] [address] N/A 8/25/2022    COLONOSCOPY POLYPECTOMY SNARE/COLD BIOPSY performed by Luís Rolon MD at [facility], TOTAL ABDOMINAL (CERVIX REMOVED)      partial       CURRENTMEDICATIONS       Current Discharge Medication List        CONTINUE these medications which have NOT CHANGED    Details   hydrocortisone (ANUSOL-HC) 25 MG suppository Place 1 suppository rectally 2 times daily for 10 days  Qty: 20 suppository, Refills: 0      magnesium oxide (MAG-OX) 400 (240 Mg) MG tablet Take 1 tablet by mouth daily  Qty: 30 tablet, Refills: 1      methocarbamol (ROBAXIN) 750 MG tablet Take 1 tablet by mouth 3 times daily  Qty: 90 tablet, Refills: 1      potassium chloride (KLOR-CON) 10 MEQ extended release tablet Take 1 tablet by mouth 2 times daily  Qty: 60 tablet, Refills: 1      polycarbophil (FIBERCON) 625 MG tablet Take 1 tablet by mouth at bedtime  Refills: 4      Ergocalciferol (VITAMIN D) 64804 units CAPS Take 50,000 Units by mouth once a week  Qty: 5 capsule, Refills: 1      Pirfenidone 267 MG TABS Take 3 tablets by mouth in the morning, at noon, and at bedtime  Qty: 270 tablet, Refills: 3      albuterol sulfate HFA (PROVENTIL;VENTOLIN;PROAIR) 108 (90 Base) MCG/ACT inhaler INHALE 2 PUFFS INTO THE LUNGS EVERY 6 HOURS AS NEEDED FOR WHEEZING  Qty: 3 each, Refills: 1      atorvastatin (LIPITOR) 40 MG tablet Take 1 tablet by mouth daily  Qty: 90 tablet, Refills: 1    Associated Diagnoses: Combined hyperlipidemia associated with type 2 diabetes mellitus (HCC)      levothyroxine (SYNTHROID) 125 MCG tablet Take 1 tablet by mouth Daily TAKE 1 TABLET BY MOUTH EVERY DAY  Qty: 90 tablet, Refills: 1    Associated Diagnoses: Acquired hypothyroidism      sertraline (ZOLOFT) 100 MG tablet Take 2 tablets by mouth daily  Qty: 180 tablet, Refills: 1    Associated Diagnoses: Mild single current episode of major depressive disorder ([facility] [address] Anxiety      mirtazapine (REMERON) 30 MG tablet Take 1 tablet by mouth nightly  Qty: 90 tablet, Refills: 1    Associated Diagnoses: Anxiety      nadolol (CORGARD) 20 MG tablet Take 1 tablet by mouth daily  Qty: 90 tablet, Refills: 1      pantoprazole (PROTONIX) 40 MG tablet Take 1 tablet by mouth every morning (before breakfast)  Qty: 90 tablet, Refills: 1      loratadine-pseudoephedrine ([employer] ALLERGY RELIEF-D12) 5-120 MG per extended release tablet TAKE 1 TABLET BY MOUTH TWICE A DAY  Qty: 60 tablet, Refills: 5      benzonatate (TESSALON) 200 MG capsule TAKE 1 CAPSULE BY MOUTH 3 TIMES A DAY AS NEEDED FOR COUGH  Qty: 90 capsule, Refills: 5      hydrOXYzine (ATARAX) 10 MG tablet Take 1 tablet by mouth every 8 hours as needed for Itching TAKE 1 TO 3 TABLETS BY MOUTH 3 TIMES DAILY AS NEEDED FOR ITCHING  Qty: 90 tablet, Refills: 1    Associated Diagnoses: Itching      acetaminophen (TYLENOL) 325 MG tablet       guaiFENesin (MUCINEX) 600 MG extended release tablet Take 1,200 mg by mouth every morning      albuterol (PROVENTIL) (2.5 MG/3ML) 0.083% nebulizer solution INHALE THE CONTENTS OF 1 VIAL VIA NEBULIZER EVERY 6 HOURS AS NEEDED FOR WHEEZING  Qty: 720 mL, Refills: 1    Associated Diagnoses: Restrictive lung disease      Blood Glucose Monitoring Suppl (TRUE METRIX METER) PUJA Test daily and as needed  Qty: 1 each, Refills: 0    Associated Diagnoses: Type 2 diabetes mellitus without complication, without long-term current use of insulin ([facility])      blood glucose test strips (ASCENSIA AUTODISC VI;ONE TOUCH ULTRA TEST VI) strip 1 each by In Vitro route daily As needed.   Qty: 100 each, Refills: 3 Associated Diagnoses: Type 2 diabetes mellitus without complication, without long-term current use of insulin (HCC)      TRUEplus Lancets 33G MISC Use daily  Qty: 100 each, Refills: 3    Associated Diagnoses: Type 2 diabetes mellitus without complication, without long-term current use of insulin (HCC)      Blood Glucose Calibration (TRUE METRIX LEVEL 1) Low SOLN use as needed  Qty: 1 each, Refills: 2      INSULIN SYRINGE 1CC/29G 29G X 1/2\" 1 ML MISC 1 each by Does not apply route 2 times daily  Qty: 100 each, Refills: 5    Associated Diagnoses: Type 2 diabetes mellitus without complication, without long-term current use of insulin ([facility])      OXYGEN Inhale 3 L into the lungs             ALLERGIES     Penicillins, Cefuroxime, Doxycycline, Imitrex [sumatriptan], Meperidine, Other, Sulfa antibiotics, Keflex [cephalexin], and Tape [adhesive tape]    FAMILYHISTORY       Family History   Problem Relation Age of Onset    Diabetes Mother     High Blood Pressure Mother     Asthma Sister     Other Father         SOCIAL HISTORY       Social History     Socioeconomic History    Marital status:      Spouse name: None    Number of children: 7    Years of education: None    Highest education level: None   Tobacco Use    Smoking status: Never    Smokeless tobacco: Never   Vaping Use    Vaping Use: Never used   Substance and Sexual Activity    Alcohol use: No    Drug use: No    Sexual activity: Not Currently     Social Determinants of Health     Transportation Needs: No Transportation Needs    Lack of Transportation (Medical): No    Lack of Transportation (Non-Medical):  No       SCREENINGS    Skyler Coma Scale  Eye Opening: Spontaneous  Best Verbal Response: Oriented  Best Motor Response: Obeys commands  Skyler Coma Scale Score: 15        PHYSICAL EXAM    (up to 7 for level 4, 8 or more for level 5)     ED Triage Vitals [09/09/22 1731]   BP Temp Temp Source Heart Rate Resp SpO2 Height Weight   135/88 98.6 °F (37 °C) Oral (!) 112 26 100 % 5' 3\" (1.6 m) 132 lb (59.9 kg)       Physical Exam  Vitals and nursing note reviewed. Constitutional:       Appearance: Normal appearance. She is not toxic-appearing or diaphoretic. HENT:      Head: Normocephalic and atraumatic. Nose: Nose normal.   Eyes:      General:         Right eye: No discharge. Left eye: No discharge. Cardiovascular:      Rate and Rhythm: Normal rate and regular rhythm. Heart sounds: No murmur heard. Pulmonary:      Effort: Pulmonary effort is normal. No respiratory distress. Breath sounds: Examination of the left-lower field reveals decreased breath sounds. Decreased breath sounds and wheezing present. Musculoskeletal:         General: Normal range of motion. Cervical back: Normal range of motion and neck supple. Skin:     General: Skin is warm and dry. Neurological:      General: No focal deficit present. Mental Status: She is alert and oriented to person, place, and time.    Psychiatric:         Mood and Affect: Mood normal.         Behavior: Behavior normal.       DIAGNOSTIC RESULTS   LABS:    Labs Reviewed   COVID-19 & INFLUENZA COMBO - Abnormal; Notable for the following components:       Result Value    SARS-CoV-2 RNA, RT PCR DETECTED (*)     All other components within normal limits    Narrative:     Mackenzie MIKE tel. [phone],  Microbiology results called to and read back by monika hoang rn, 09/09/2022  18:41, by Akshat Chandler, VENOUS - Abnormal; Notable for the following components:    pCO2, Jasper 57.0 (*)     pO2, Jasper 45.8 (*)     HCO3, Venous 31.8 (*)     Base Excess, Jasper 5.3 (*)     Carboxyhemoglobin 2.1 (*)     All other components within normal limits   CBC WITH AUTO DIFFERENTIAL - Abnormal; Notable for the following components:    RBC 3.24 (*)     Hemoglobin 8.9 (*)     Hematocrit 27.1 (*)     RDW 17.1 (*)     Eosinophils Absolute 1.1 (*)     All other components within normal limits   COMPREHENSIVE METABOLIC PANEL W/ REFLEX TO MG FOR LOW K - Abnormal; Notable for the following components:    Potassium reflex Magnesium 3.1 (*)     Chloride 98 (*)     Creatinine <0.5 (*)     Calcium 8.1 (*)     Total Protein 5.8 (*)     Albumin 2.6 (*)     Albumin/Globulin Ratio 0.8 (*)     Alkaline Phosphatase 138 (*)     ALT 9 (*)     All other components within normal limits   BRAIN NATRIURETIC PEPTIDE - Abnormal; Notable for the following components:    Pro- (*)     All other components within normal limits   MAGNESIUM - Abnormal; Notable for the following components:    Magnesium 1.60 (*)     All other components within normal limits   CULTURE, BLOOD 1   CULTURE, BLOOD 2   URINALYSIS WITH REFLEX TO CULTURE   LACTIC ACID   TROPONIN   HEMOGLOBIN A1C   POCT GLUCOSE   POCT GLUCOSE   POCT GLUCOSE   POCT GLUCOSE   POCT GLUCOSE       When ordered, only abnormal lab results are displayed. All other labs were within normal range or not returned as of this dictation. EKG: When ordered, EKG's are interpreted by the Emergency Department Physician in the absence of a cardiologist.  Please see their note for interpretation of EKG. RADIOLOGY:   Non-plain film images such as CT, Ultrasound and MRI are read by the radiologist. Deanna Moreno radiographic images are visualized andpreliminarily interpreted by the  ED Provider with the below findings:        Interpretation [facility] Radiologist below, if available at the time of this note:    XR CHEST PORTABLE   Final Result   Right chest tube is in normal position. No pneumothorax is evident. Interstitial opacities, correlating with extensive emphysematous disease. XR CHEST PORTABLE   Final Result   Interval placement of a locking loop chest tube at the right lung apex with   resolved pneumothorax. CT CHEST PULMONARY EMBOLISM W CONTRAST   Final Result   No evidence of pulmonary embolism      Right apical, anterior and lateral pneumothorax approximately 30%.  Chest tube   extends along the right lateral chest wall however tip is extrapleural and   lateral to the upper ribcage      Coronary artery/atherosclerotic disease. Cardiomegaly      Extensive bullous changes and pulmonary fibrosis      Small right pleural effusion      RECOMMENDATIONS:   Re-evaluate for reinsertion of right chest tube         XR CHEST PORTABLE   Final Result   Nearly completely resolved right pneumothorax. Chest tube along the right   lateral hemithorax however tip appears extrapleural.         XR CHEST PORTABLE   Final Result   1. Moderate right-sided pneumothorax with approximately 4 cm of pleural   separation at the lung apex. No definite evidence of tension morphology at   this time. 2.  Chronic interstitial lung disease         XR CHEST PORTABLE    (Results Pending)     No results found.       PROCEDURES   Unless otherwise noted below, none     Procedures    CRITICAL CARE TIME   N/A    CONSULTS:  IP CONSULT TO PULMONOLOGY  IP CONSULT TO PULMONOLOGY  IP CONSULT TO HOSPITALIST      EMERGENCY DEPARTMENT COURSE and DIFFERENTIAL DIAGNOSIS/MDM:   Vitals:    Vitals:    09/10/22 1153 09/10/22 1200 09/10/22 1300 09/10/22 1400   BP:  (!) 114/91 114/68 (!) 132/90   Pulse:  (!) 110 87 88   Resp:  27 14 30   Temp:       TempSrc:       SpO2:  100% 100% (!) 84%   Weight:       Height: 5' 3\" (1.6 m)          Patient was given thefollowing medications:  Medications   acetaminophen (TYLENOL) tablet 1,000 mg (0 mg Oral Held 9/10/22 0008)   albuterol sulfate HFA (PROVENTIL;VENTOLIN;PROAIR) 108 (90 Base) MCG/ACT inhaler 2 puff (2 puffs Inhalation Given 9/10/22 1423)   atorvastatin (LIPITOR) tablet 40 mg (40 mg Oral Given 9/10/22 1101)   benzonatate (TESSALON) capsule 100 mg (has no administration in time range)   guaiFENesin (MUCINEX) extended release tablet 1,200 mg (1,200 mg Oral Given 9/10/22 1101)   hydrOXYzine HCl (ATARAX) tablet 10 mg (has no administration in time range)   levothyroxine (SYNTHROID) tablet 125 mcg (125 mcg Oral Given 9/10/22 1101)   magnesium oxide (MAG-OX) tablet 400 mg (400 mg Oral Given 9/10/22 1101)   nadolol (CORGARD) tablet 20 mg (20 mg Oral Given 9/10/22 1103)   pantoprazole (PROTONIX) tablet 40 mg (40 mg Oral Given 9/10/22 0906)   glucose chewable tablet 16 g (has no administration in time range)   dextrose bolus 10% 125 mL (has no administration in time range)     Or   dextrose bolus 10% 250 mL (has no administration in time range)   glucagon (rDNA) injection 1 mg (has no administration in time range)   dextrose 10 % infusion (has no administration in time range)   insulin lispro (HUMALOG) injection vial 0-4 Units (0 Units SubCUTAneous Not Given 9/10/22 1359)   insulin lispro (HUMALOG) injection vial 0-4 Units (0 Units SubCUTAneous Held 9/10/22 0429)   sodium chloride flush 0.9 % injection 5-40 mL (10 mLs IntraVENous Given 9/10/22 1104)   sodium chloride flush 0.9 % injection 5-40 mL (has no administration in time range)   0.9 % sodium chloride infusion (has no administration in time range)   enoxaparin (LOVENOX) injection 40 mg (40 mg SubCUTAneous Given 9/10/22 1102)   ondansetron (ZOFRAN-ODT) disintegrating tablet 4 mg (has no administration in time range)     Or   ondansetron (ZOFRAN) injection 4 mg (has no administration in time range)   polyethylene glycol (GLYCOLAX) packet 17 g (has no administration in time range)   acetaminophen (TYLENOL) tablet 650 mg (has no administration in time range)     Or   acetaminophen (TYLENOL) suppository 650 mg (has no administration in time range)   morphine (PF) injection 2 mg (has no administration in time range)   morphine sulfate (PF) injection 4 mg (4 mg IntraVENous Given 9/10/22 1400)   mirtazapine (REMERON) tablet 30 mg (has no administration in time range)   methocarbamol (ROBAXIN) tablet 750 mg (has no administration in time range)   sertraline (ZOLOFT) tablet 200 mg (has no administration in time range)   Pirfenidone TABS 3 tablet (Patient Supplied) (2 tablets Oral Given 9/10/22 1103)   Pirfenidone TABS 3 tablet (Patient Supplied) (has no administration in time range)   dexamethasone (PF) (DECADRON) injection 6 mg (6 mg IntraVENous Given 9/10/22 1102)   albuterol sulfate HFA (PROVENTIL;VENTOLIN;PROAIR) 108 (90 Base) MCG/ACT inhaler 2 puff (has no administration in time range)   dexamethasone (PF) (DECADRON) injection 6 mg (6 mg IntraVENous Given 9/9/22 2111)   magnesium sulfate 2000 mg in 50 mL IVPB premix (0 mg IntraVENous Stopped 9/9/22 2346)   potassium chloride 10 mEq/100 mL IVPB (Peripheral Line) (0 mEq IntraVENous Stopped 9/10/22 0327)   iopamidol (ISOVUE-370) 76 % injection 85 mL (85 mLs IntraVENous Given 9/9/22 2106)   morphine sulfate (PF) injection 4 mg (4 mg IntraVENous Given 9/9/22 2110)   morphine sulfate (PF) injection 4 mg (4 mg IntraVENous Given 9/10/22 0013)     ED Course as of 09/10/22 1508   Sat Sep 10, 2022   0013 The Ekg interpreted by me shows  sinus tachycardia, hvpd=604   Axis is   Left axis deviation  QTc is  within an acceptable range  Intervals and Durations are unremarkable. ST Segments: nonspecific changes  No significant change from prior EKG dated 7/14/22 [ER]      ED Course User Index  [ER] Olga Moody MD      Is this patient to be included in the SEP-1 Core Measure due to severe sepsis or septic shock? No   Exclusion criteria - the patient is NOT to be included for SEP-1 Core Measure due to:  2+ SIRS criteria are not met    Patient is work-up of the area in the emergency department displayed a right-sided moderate pneumothorax. No obvious tension pneumothorax. Patient was immediately reevaluated the emergency department upon evaluation of her pneumothorax on x-ray. She continued to be oxygenating well with 6 L nasal cannula but went ahead and placed her on a nonrebreather as she was noted to have a pneumothorax. Patient was also noted to have COVID-19.   Pneumothorax did display improvement after tube placement by  Memo Ruth.  Shortly after chest tube placement signout was performed to Dr. Memo Ruth. The patient's laboratory work is still in process. We will go ahead and perform CT scan of the chest PE study rule out further acute etiology. I am the Primary Clinician of Record. FINAL IMPRESSION      1. Primary spontaneous pneumothorax    2. Acute on chronic respiratory failure with hypoxia [facility])          DISPOSITION/PLAN   DISPOSITION Admitted 09/10/2022 12:48:21 AM      PATIENT REFERREDTO:  No follow-up provider specified.     DISCHARGE MEDICATIONS:  Current Discharge Medication List          DISCONTINUED MEDICATIONS:  Current Discharge Medication List        STOP taking these medications       empagliflozin (JARDIANCE) 25 MG tablet Comments:   Reason for Stopping:         SITagliptin (JANUVIA) 100 MG tablet Comments:   Reason for Stopping:         furosemide (LASIX) 40 MG tablet Comments:   Reason for Stopping:                      (Please note that portions ofthis note were completed with a voice recognition program.  Efforts were made to edit the dictations but occasionally words are mis-transcribed.)    PANDA Samuels CNP (electronically signed)             PANDA Samuels CNP  09/10/22 4815

## 2022-09-10 ENCOUNTER — APPOINTMENT (OUTPATIENT)
Dept: GENERAL RADIOLOGY | Age: 67
DRG: 177 | End: 2022-09-10
Payer: MEDICARE

## 2022-09-10 PROBLEM — U07.1 PNEUMONIA DUE TO COVID-19 VIRUS: Status: ACTIVE | Noted: 2022-09-10

## 2022-09-10 PROBLEM — J93.11 PRIMARY SPONTANEOUS PNEUMOTHORAX: Status: ACTIVE | Noted: 2022-09-10

## 2022-09-10 PROBLEM — J96.01 ACUTE RESPIRATORY FAILURE WITH HYPOXIA (HCC): Status: ACTIVE | Noted: 2022-01-24

## 2022-09-10 PROBLEM — J12.82 PNEUMONIA DUE TO COVID-19 VIRUS: Status: ACTIVE | Noted: 2022-09-10

## 2022-09-10 LAB
GLUCOSE BLD-MCNC: 110 MG/DL (ref 70–99)
GLUCOSE BLD-MCNC: 172 MG/DL (ref 70–99)
GLUCOSE BLD-MCNC: 97 MG/DL (ref 70–99)
PERFORMED ON: ABNORMAL
PERFORMED ON: ABNORMAL
PERFORMED ON: NORMAL

## 2022-09-10 PROCEDURE — 2060000000 HC ICU INTERMEDIATE R&B

## 2022-09-10 PROCEDURE — 99223 1ST HOSP IP/OBS HIGH 75: CPT | Performed by: INTERNAL MEDICINE

## 2022-09-10 PROCEDURE — 2580000003 HC RX 258: Performed by: HOSPITALIST

## 2022-09-10 PROCEDURE — 6370000000 HC RX 637 (ALT 250 FOR IP): Performed by: HOSPITALIST

## 2022-09-10 PROCEDURE — 6360000002 HC RX W HCPCS: Performed by: STUDENT IN AN ORGANIZED HEALTH CARE EDUCATION/TRAINING PROGRAM

## 2022-09-10 PROCEDURE — 6370000000 HC RX 637 (ALT 250 FOR IP): Performed by: INTERNAL MEDICINE

## 2022-09-10 PROCEDURE — 71045 X-RAY EXAM CHEST 1 VIEW: CPT

## 2022-09-10 PROCEDURE — 6360000002 HC RX W HCPCS: Performed by: INTERNAL MEDICINE

## 2022-09-10 PROCEDURE — 6360000002 HC RX W HCPCS: Performed by: HOSPITALIST

## 2022-09-10 PROCEDURE — 2000000000 HC ICU R&B

## 2022-09-10 PROCEDURE — 2700000000 HC OXYGEN THERAPY PER DAY

## 2022-09-10 PROCEDURE — 94761 N-INVAS EAR/PLS OXIMETRY MLT: CPT

## 2022-09-10 PROCEDURE — 94640 AIRWAY INHALATION TREATMENT: CPT

## 2022-09-10 RX ORDER — LANOLIN ALCOHOL/MO/W.PET/CERES
400 CREAM (GRAM) TOPICAL DAILY
Status: DISCONTINUED | OUTPATIENT
Start: 2022-09-10 | End: 2022-09-23 | Stop reason: HOSPADM

## 2022-09-10 RX ORDER — ACETAMINOPHEN 325 MG/1
650 TABLET ORAL EVERY 6 HOURS PRN
Status: DISCONTINUED | OUTPATIENT
Start: 2022-09-10 | End: 2022-09-23 | Stop reason: HOSPADM

## 2022-09-10 RX ORDER — INSULIN LISPRO 100 [IU]/ML
0-4 INJECTION, SOLUTION INTRAVENOUS; SUBCUTANEOUS NIGHTLY
Status: DISCONTINUED | OUTPATIENT
Start: 2022-09-10 | End: 2022-09-23 | Stop reason: HOSPADM

## 2022-09-10 RX ORDER — SERTRALINE HYDROCHLORIDE 100 MG/1
200 TABLET, FILM COATED ORAL DAILY
Status: DISCONTINUED | OUTPATIENT
Start: 2022-09-10 | End: 2022-09-10

## 2022-09-10 RX ORDER — ALBUTEROL SULFATE 90 UG/1
2 AEROSOL, METERED RESPIRATORY (INHALATION) 2 TIMES DAILY
Status: DISCONTINUED | OUTPATIENT
Start: 2022-09-10 | End: 2022-09-11

## 2022-09-10 RX ORDER — PIRFENIDONE 267 MG/1
3 TABLET, FILM COATED ORAL ONCE
Status: COMPLETED | OUTPATIENT
Start: 2022-09-10 | End: 2022-09-10

## 2022-09-10 RX ORDER — MIRTAZAPINE 15 MG/1
30 TABLET, FILM COATED ORAL NIGHTLY
Status: DISCONTINUED | OUTPATIENT
Start: 2022-09-10 | End: 2022-09-10

## 2022-09-10 RX ORDER — SERTRALINE HYDROCHLORIDE 100 MG/1
200 TABLET, FILM COATED ORAL DAILY
Status: DISCONTINUED | OUTPATIENT
Start: 2022-09-11 | End: 2022-09-23 | Stop reason: HOSPADM

## 2022-09-10 RX ORDER — ONDANSETRON 4 MG/1
4 TABLET, ORALLY DISINTEGRATING ORAL EVERY 8 HOURS PRN
Status: DISCONTINUED | OUTPATIENT
Start: 2022-09-10 | End: 2022-09-23 | Stop reason: HOSPADM

## 2022-09-10 RX ORDER — MORPHINE SULFATE 4 MG/ML
4 INJECTION, SOLUTION INTRAMUSCULAR; INTRAVENOUS EVERY 4 HOURS PRN
Status: DISCONTINUED | OUTPATIENT
Start: 2022-09-10 | End: 2022-09-23 | Stop reason: HOSPADM

## 2022-09-10 RX ORDER — ACETAMINOPHEN 500 MG
1000 TABLET ORAL ONCE
Status: DISCONTINUED | OUTPATIENT
Start: 2022-09-10 | End: 2022-09-23 | Stop reason: HOSPADM

## 2022-09-10 RX ORDER — MORPHINE SULFATE 2 MG/ML
2 INJECTION, SOLUTION INTRAMUSCULAR; INTRAVENOUS EVERY 4 HOURS PRN
Status: DISCONTINUED | OUTPATIENT
Start: 2022-09-10 | End: 2022-09-23 | Stop reason: HOSPADM

## 2022-09-10 RX ORDER — DEXTROSE MONOHYDRATE 100 MG/ML
INJECTION, SOLUTION INTRAVENOUS CONTINUOUS PRN
Status: DISCONTINUED | OUTPATIENT
Start: 2022-09-10 | End: 2022-09-23 | Stop reason: HOSPADM

## 2022-09-10 RX ORDER — PANTOPRAZOLE SODIUM 40 MG/1
40 TABLET, DELAYED RELEASE ORAL
Status: DISCONTINUED | OUTPATIENT
Start: 2022-09-10 | End: 2022-09-23 | Stop reason: HOSPADM

## 2022-09-10 RX ORDER — MIRTAZAPINE 15 MG/1
30 TABLET, FILM COATED ORAL NIGHTLY PRN
Status: DISCONTINUED | OUTPATIENT
Start: 2022-09-10 | End: 2022-09-23 | Stop reason: HOSPADM

## 2022-09-10 RX ORDER — METHOCARBAMOL 500 MG/1
750 TABLET, FILM COATED ORAL 3 TIMES DAILY PRN
Status: DISCONTINUED | OUTPATIENT
Start: 2022-09-10 | End: 2022-09-23 | Stop reason: HOSPADM

## 2022-09-10 RX ORDER — METHOCARBAMOL 500 MG/1
750 TABLET, FILM COATED ORAL 3 TIMES DAILY
Status: DISCONTINUED | OUTPATIENT
Start: 2022-09-10 | End: 2022-09-10

## 2022-09-10 RX ORDER — GUAIFENESIN 600 MG/1
1200 TABLET, EXTENDED RELEASE ORAL EVERY MORNING
Status: DISCONTINUED | OUTPATIENT
Start: 2022-09-10 | End: 2022-09-23 | Stop reason: HOSPADM

## 2022-09-10 RX ORDER — PIRFENIDONE 267 MG/1
3 TABLET, FILM COATED ORAL 3 TIMES DAILY
Status: DISCONTINUED | OUTPATIENT
Start: 2022-09-10 | End: 2022-09-23 | Stop reason: HOSPADM

## 2022-09-10 RX ORDER — ALBUTEROL SULFATE 90 UG/1
2 AEROSOL, METERED RESPIRATORY (INHALATION) EVERY 6 HOURS PRN
Status: DISCONTINUED | OUTPATIENT
Start: 2022-09-10 | End: 2022-09-23 | Stop reason: HOSPADM

## 2022-09-10 RX ORDER — DEXAMETHASONE SODIUM PHOSPHATE 10 MG/ML
6 INJECTION, SOLUTION INTRAMUSCULAR; INTRAVENOUS EVERY 24 HOURS
Status: DISCONTINUED | OUTPATIENT
Start: 2022-09-10 | End: 2022-09-10

## 2022-09-10 RX ORDER — MORPHINE SULFATE 4 MG/ML
4 INJECTION, SOLUTION INTRAMUSCULAR; INTRAVENOUS ONCE
Status: COMPLETED | OUTPATIENT
Start: 2022-09-10 | End: 2022-09-10

## 2022-09-10 RX ORDER — CETIRIZINE HYDROCHLORIDE, PSEUDOEPHEDRINE HYDROCHLORIDE 5; 120 MG/1; MG/1
1 TABLET, FILM COATED, EXTENDED RELEASE ORAL DAILY
Status: DISCONTINUED | OUTPATIENT
Start: 2022-09-10 | End: 2022-09-10

## 2022-09-10 RX ORDER — ENOXAPARIN SODIUM 100 MG/ML
40 INJECTION SUBCUTANEOUS DAILY
Status: DISCONTINUED | OUTPATIENT
Start: 2022-09-10 | End: 2022-09-14

## 2022-09-10 RX ORDER — DEXAMETHASONE SODIUM PHOSPHATE 10 MG/ML
6 INJECTION, SOLUTION INTRAMUSCULAR; INTRAVENOUS EVERY 12 HOURS
Status: DISCONTINUED | OUTPATIENT
Start: 2022-09-10 | End: 2022-09-12

## 2022-09-10 RX ORDER — ACETAMINOPHEN 650 MG/1
650 SUPPOSITORY RECTAL EVERY 6 HOURS PRN
Status: DISCONTINUED | OUTPATIENT
Start: 2022-09-10 | End: 2022-09-23 | Stop reason: HOSPADM

## 2022-09-10 RX ORDER — ATORVASTATIN CALCIUM 40 MG/1
40 TABLET, FILM COATED ORAL DAILY
Status: DISCONTINUED | OUTPATIENT
Start: 2022-09-10 | End: 2022-09-23 | Stop reason: HOSPADM

## 2022-09-10 RX ORDER — SODIUM CHLORIDE 0.9 % (FLUSH) 0.9 %
5-40 SYRINGE (ML) INJECTION PRN
Status: DISCONTINUED | OUTPATIENT
Start: 2022-09-10 | End: 2022-09-23 | Stop reason: HOSPADM

## 2022-09-10 RX ORDER — PIRFENIDONE 267 MG/1
3 TABLET, FILM COATED ORAL 3 TIMES DAILY
Status: DISCONTINUED | OUTPATIENT
Start: 2022-09-10 | End: 2022-09-10 | Stop reason: CLARIF

## 2022-09-10 RX ORDER — POLYETHYLENE GLYCOL 3350 17 G/17G
17 POWDER, FOR SOLUTION ORAL DAILY PRN
Status: DISCONTINUED | OUTPATIENT
Start: 2022-09-10 | End: 2022-09-23 | Stop reason: HOSPADM

## 2022-09-10 RX ORDER — HYDROXYZINE HYDROCHLORIDE 10 MG/1
10 TABLET, FILM COATED ORAL EVERY 8 HOURS PRN
Status: DISCONTINUED | OUTPATIENT
Start: 2022-09-10 | End: 2022-09-23 | Stop reason: HOSPADM

## 2022-09-10 RX ORDER — LEVOTHYROXINE SODIUM 0.12 MG/1
125 TABLET ORAL DAILY
Status: DISCONTINUED | OUTPATIENT
Start: 2022-09-10 | End: 2022-09-23 | Stop reason: HOSPADM

## 2022-09-10 RX ORDER — BENZONATATE 100 MG/1
100 CAPSULE ORAL EVERY 4 HOURS PRN
Status: DISCONTINUED | OUTPATIENT
Start: 2022-09-10 | End: 2022-09-14

## 2022-09-10 RX ORDER — INSULIN LISPRO 100 [IU]/ML
0-4 INJECTION, SOLUTION INTRAVENOUS; SUBCUTANEOUS
Status: DISCONTINUED | OUTPATIENT
Start: 2022-09-10 | End: 2022-09-23 | Stop reason: HOSPADM

## 2022-09-10 RX ORDER — ONDANSETRON 2 MG/ML
4 INJECTION INTRAMUSCULAR; INTRAVENOUS EVERY 6 HOURS PRN
Status: DISCONTINUED | OUTPATIENT
Start: 2022-09-10 | End: 2022-09-23 | Stop reason: HOSPADM

## 2022-09-10 RX ORDER — NADOLOL 40 MG/1
20 TABLET ORAL DAILY
Status: DISCONTINUED | OUTPATIENT
Start: 2022-09-10 | End: 2022-09-23 | Stop reason: HOSPADM

## 2022-09-10 RX ORDER — SODIUM CHLORIDE 0.9 % (FLUSH) 0.9 %
5-40 SYRINGE (ML) INJECTION EVERY 12 HOURS SCHEDULED
Status: DISCONTINUED | OUTPATIENT
Start: 2022-09-10 | End: 2022-09-23 | Stop reason: HOSPADM

## 2022-09-10 RX ORDER — SODIUM CHLORIDE 9 MG/ML
INJECTION, SOLUTION INTRAVENOUS PRN
Status: DISCONTINUED | OUTPATIENT
Start: 2022-09-10 | End: 2022-09-23 | Stop reason: HOSPADM

## 2022-09-10 RX ADMIN — MAGNESIUM GLUCONATE 500 MG ORAL TABLET 400 MG: 500 TABLET ORAL at 11:01

## 2022-09-10 RX ADMIN — MORPHINE SULFATE 4 MG: 4 INJECTION, SOLUTION INTRAMUSCULAR; INTRAVENOUS at 18:39

## 2022-09-10 RX ADMIN — ATORVASTATIN CALCIUM 40 MG: 40 TABLET, FILM COATED ORAL at 11:01

## 2022-09-10 RX ADMIN — Medication 2 PUFF: at 14:23

## 2022-09-10 RX ADMIN — MORPHINE SULFATE 4 MG: 4 INJECTION, SOLUTION INTRAMUSCULAR; INTRAVENOUS at 09:06

## 2022-09-10 RX ADMIN — MORPHINE SULFATE 4 MG: 4 INJECTION, SOLUTION INTRAMUSCULAR; INTRAVENOUS at 23:20

## 2022-09-10 RX ADMIN — PANTOPRAZOLE SODIUM 40 MG: 40 TABLET, DELAYED RELEASE ORAL at 09:06

## 2022-09-10 RX ADMIN — MORPHINE SULFATE 4 MG: 4 INJECTION, SOLUTION INTRAMUSCULAR; INTRAVENOUS at 00:13

## 2022-09-10 RX ADMIN — MORPHINE SULFATE 4 MG: 4 INJECTION, SOLUTION INTRAMUSCULAR; INTRAVENOUS at 05:21

## 2022-09-10 RX ADMIN — LEVOTHYROXINE SODIUM 125 MCG: 125 TABLET ORAL at 11:01

## 2022-09-10 RX ADMIN — GUAIFENESIN 1200 MG: 600 TABLET, EXTENDED RELEASE ORAL at 11:01

## 2022-09-10 RX ADMIN — NADOLOL 20 MG: 40 TABLET ORAL at 11:03

## 2022-09-10 RX ADMIN — DEXAMETHASONE SODIUM PHOSPHATE 6 MG: 10 INJECTION, SOLUTION INTRAMUSCULAR; INTRAVENOUS at 23:19

## 2022-09-10 RX ADMIN — SODIUM CHLORIDE, PRESERVATIVE FREE 10 ML: 5 INJECTION INTRAVENOUS at 20:06

## 2022-09-10 RX ADMIN — PIRFENIDONE 3 TABLET: 267 TABLET, FILM COATED ORAL at 16:23

## 2022-09-10 RX ADMIN — PIRFENIDONE 2 TABLET: 267 TABLET, FILM COATED ORAL at 11:03

## 2022-09-10 RX ADMIN — SODIUM CHLORIDE, PRESERVATIVE FREE 10 ML: 5 INJECTION INTRAVENOUS at 11:04

## 2022-09-10 RX ADMIN — ACETAMINOPHEN 650 MG: 325 TABLET ORAL at 21:33

## 2022-09-10 RX ADMIN — Medication 10 MEQ: at 00:17

## 2022-09-10 RX ADMIN — MORPHINE SULFATE 4 MG: 4 INJECTION, SOLUTION INTRAMUSCULAR; INTRAVENOUS at 14:00

## 2022-09-10 RX ADMIN — DEXAMETHASONE SODIUM PHOSPHATE 6 MG: 10 INJECTION, SOLUTION INTRAMUSCULAR; INTRAVENOUS at 11:02

## 2022-09-10 RX ADMIN — Medication 2 PUFF: at 20:07

## 2022-09-10 RX ADMIN — ENOXAPARIN SODIUM 40 MG: 100 INJECTION SUBCUTANEOUS at 11:02

## 2022-09-10 RX ADMIN — PIRFENIDONE 2 TABLET: 267 TABLET, FILM COATED ORAL at 20:09

## 2022-09-10 ASSESSMENT — PAIN DESCRIPTION - DESCRIPTORS
DESCRIPTORS: ACHING
DESCRIPTORS: ACHING

## 2022-09-10 ASSESSMENT — PAIN DESCRIPTION - LOCATION
LOCATION: RIB CAGE
LOCATION: CHEST
LOCATION: CHEST
LOCATION: HEAD

## 2022-09-10 ASSESSMENT — ENCOUNTER SYMPTOMS
VOMITING: 0
SORE THROAT: 0
BLOOD IN STOOL: 0
ABDOMINAL PAIN: 0
SHORTNESS OF BREATH: 1
RHINORRHEA: 0
BACK PAIN: 0
NAUSEA: 0
COUGH: 1
EYE PAIN: 0
DIARRHEA: 0

## 2022-09-10 ASSESSMENT — LIFESTYLE VARIABLES: HOW OFTEN DO YOU HAVE A DRINK CONTAINING ALCOHOL: NEVER

## 2022-09-10 ASSESSMENT — PAIN DESCRIPTION - ONSET: ONSET: SUDDEN

## 2022-09-10 ASSESSMENT — PAIN SCALES - GENERAL
PAINLEVEL_OUTOF10: 6
PAINLEVEL_OUTOF10: 7
PAINLEVEL_OUTOF10: 3
PAINLEVEL_OUTOF10: 5
PAINLEVEL_OUTOF10: 8
PAINLEVEL_OUTOF10: 7

## 2022-09-10 ASSESSMENT — PAIN DESCRIPTION - ORIENTATION
ORIENTATION: RIGHT
ORIENTATION: ANTERIOR
ORIENTATION: RIGHT

## 2022-09-10 ASSESSMENT — PAIN DESCRIPTION - FREQUENCY: FREQUENCY: INTERMITTENT

## 2022-09-10 ASSESSMENT — PAIN DESCRIPTION - PAIN TYPE: TYPE: ACUTE PAIN

## 2022-09-10 NOTE — PROGRESS NOTES
AM assessment complete, see flowsheet. Pt needs to have breakfast and morning protonix before receiving her morning meds. Breakfast and protonix given at this time. Morning medications to follow. Pt ate breakfast and tolerated being on NC 8 L whiling eating. Pt states pain of 7/10 at the Chest tube site, PRN morphine to be given. No other needs expressed. Will continue to monitor and assess.

## 2022-09-10 NOTE — PROGRESS NOTES
4 Eyes Skin Assessment     The patient is being assess for   Admission    I agree that 2 RN's have performed a thorough Head to Toe Skin Assessment on the patient. ALL assessment sites listed below have been assessed. Areas assessed for pressure by both nurses:   [x]   Head, Face, and Ears   [x]   Shoulders, Back, and Chest, Abdomen  [x]   Arms, Elbows, and Hands   [x]   Coccyx, Sacrum, and Ischium  [x]   Legs, Feet, and Heels        Skin Assessed Under all Medical Devices by both nurses:  O2 device tubing and securement devices  Healing incisions noted to mid upper back. All Mepilex Borders were peeled back and area peeked at by both nurses:  No: NA  Please list where Mepilex Borders are located:  NA              **SHARE this note so that the co-signing nurse is able to place an eSignature**    Co-signer eSignature: Electronically signed by Mike Newberry RN on 9/10/22 at 7:06 AM EDT    Does the Patient have Skin Breakdown related to pressure?   No     (Insert Photo here NA)         Amos Prevention initiated:  NA   Wound Care Orders initiated:  NA      [facility] nurse consulted for Pressure Injury (Stage 3,4, Unstageable, DTI, NWPT, Complex wounds)and New or Established Ostomies:  NA      Primary Nurse eSignature: Electronically signed by Odilia Lombard, RN on 9/10/22 at 7:03 AM EDT

## 2022-09-10 NOTE — PROGRESS NOTES
Care rounds completed with Dr. Veronica Ruth and multidisciplinary team. Reviewed labs, meds, VS, assessment, & plan of care for today.  See dictated note and new orders for details.     -chest tube switched to water seal  -follow up chest xray in 4 hours

## 2022-09-10 NOTE — PROCEDURES
Chest Tube Procedure Note  Procedure - Closed Tube Thoracostomy    A time-out was completed and placed in paper chart. The patient was positioned appropriately for chest tube placement. Previously placed subcutaneous extrapleural chest tube was removed. The patient's right chest was prepped and draped in sterile fashion. 1% Lidocaine was used to anesthetize the surrounding skin area. A skin incision was made in the mid-axillary line at the inframammary crease. Utilizing the 12 Spanish pigtail catheter, a subcutaneous tunnel was created  just adjacent to the superior rib. The pleural space was entered and a gush of air was observed. Via Seldinger technique, the guide needle was removed and chest tube was inserted and positioned appropriately. The chest tube was sutured securely to the skin and a sterile dressing applied. A pleurevac was attached to the chest tube and then attached to continuous low wall suction. Chest x-ray was obtained for proper placement. Estimated Blood Loss: 1ml    XR CHEST PORTABLE   Final Result   Interval placement of a locking loop chest tube at the right lung apex with   resolved pneumothorax.

## 2022-09-10 NOTE — PROGRESS NOTES
RT Inhaler-Nebulizer Bronchodilator Protocol Note    There is a bronchodilator order in the chart from a provider indicating to follow the RT Bronchodilator Protocol and there is an Initiate RT Inhaler-Nebulizer Bronchodilator Protocol order as well (see protocol at bottom of note). CXR Findings:  XR CHEST PORTABLE    Result Date: 9/10/2022  Right chest tube is in normal position. No pneumothorax is evident. Interstitial opacities, correlating with extensive emphysematous disease. XR CHEST PORTABLE    Result Date: 9/10/2022  Interval placement of a locking loop chest tube at the right lung apex with resolved pneumothorax. XR CHEST PORTABLE    Result Date: 9/9/2022  Nearly completely resolved right pneumothorax. Chest tube along the right lateral hemithorax however tip appears extrapleural.     XR CHEST PORTABLE    Result Date: 9/9/2022  1. Moderate right-sided pneumothorax with approximately 4 cm of pleural separation at the lung apex. No definite evidence of tension morphology at this time. 2.  Chronic interstitial lung disease       The findings from the last RT Protocol Assessment were as follows:   History Pulmonary Disease: (P) Chronic pulmonary disease  Respiratory Pattern: (P) Regular pattern and RR 12-20 bpm  Breath Sounds: (P) Slightly diminished and/or crackles  Cough: (P) Strong, spontaneous, non-productive  Indication for Bronchodilator Therapy: (P) On home bronchodilators  Bronchodilator Assessment Score: (P) 4    Aerosolized bronchodilator medication orders have been revised according to the RT Inhaler-Nebulizer Bronchodilator Protocol below. Respiratory Therapist to perform RT Therapy Protocol Assessment initially then follow the protocol. Repeat RT Therapy Protocol Assessment PRN for score 0-3 or on second treatment, BID, and PRN for scores above 3.     No Indications - adjust the frequency to every 6 hours PRN wheezing or bronchospasm, if no treatments needed after 48 hours then discontinue using Per Protocol order mode. If indication present, adjust the RT bronchodilator orders based on the Bronchodilator Assessment Score as indicated below. Use Inhaler orders unless patient has one or more of the following: on home nebulizer, not able to hold breath for 10 seconds, is not alert and oriented, cannot activate and use MDI correctly, or respiratory rate 25 breaths per minute or more, then use the equivalent nebulizer order(s) with same Frequency and PRN reasons based on the score. If a patient is on this medication at home then do not decrease Frequency below that used at home. 0-3 - enter or revise RT bronchodilator order(s) to equivalent RT Bronchodilator order with Frequency of every 4 hours PRN for wheezing or increased work of breathing using Per Protocol order mode. 4-6 - enter or revise RT Bronchodilator order(s) to two equivalent RT bronchodilator orders with one order with BID Frequency and one order with Frequency of every 4 hours PRN wheezing or increased work of breathing using Per Protocol order mode. 7-10 - enter or revise RT Bronchodilator order(s) to two equivalent RT bronchodilator orders with one order with TID Frequency and one order with Frequency of every 4 hours PRN wheezing or increased work of breathing using Per Protocol order mode. 11-13 - enter or revise RT Bronchodilator order(s) to one equivalent RT bronchodilator order with QID Frequency and an Albuterol order with Frequency of every 4 hours PRN wheezing or increased work of breathing using Per Protocol order mode. Greater than 13 - enter or revise RT Bronchodilator order(s) to one equivalent RT bronchodilator order with every 4 hours Frequency and an Albuterol order with Frequency of every 2 hours PRN wheezing or increased work of breathing using Per Protocol order mode.      RT to enter RT Home Evaluation for COPD & MDI Assessment order using Per Protocol order mode.    Electronically signed by Marita Correa RCP on 9/10/2022 at 2:43 PM

## 2022-09-10 NOTE — ED NOTES
2026- Perfect Serve sent to Dr. Rhonda Arango for consult. 2027- Call back received from Dr. Rhonda Arango. 2146- Perfect Serve message sent to Dr. Rhonda Arango for consult. 2215- Call returned by Dr. Rhonda Arango. [facility]  09/09/22 2032 2248-Perfect Serve sent to Dr. Mirta aEton for consult.        Norm Fulton  09/09/22 Adalberto Pandya  09/09/22 [id number]
Dr. Conner Baron and Mike Glasgow NP at bedside placing Chest tube to R chest.      Mar Pena RN  09/09/22 1918
Dr. Edvin Delacruz and Xena Sanford at bedside inserting chest tube to R. Chest at this time.      Barbara Cuello RN  09/10/22 [id number]
Pt. Ray Cluck on bedpan at this time. Pt. Repositioned and placed in new linens. Pt. With no needs.      Deepa Rich RN  09/09/22 6920
Report given to [address] [place] [address] Blvd, RN  09/09/22 0725
dental pain/injury

## 2022-09-10 NOTE — PROGRESS NOTES
Patient arrived to ICU 3. A&O, oriented to environment. Chest tube in place to right chest, -20 suction continued. NRB mask in place, SpO2 100%. Patient states no needs at this time. Will monitor.

## 2022-09-10 NOTE — PROGRESS NOTES
Comprehensive Nutrition Assessment    Type and Reason for Visit:  Initial, Positive Nutrition Screen (+ screen for MST = 3)    Nutrition Recommendations/Plan:   Continue ADULT DIET; Regular; 4 carb choices per meal diet order. Added Ensure high-protein with breakfast and dinner meals. Monitor appetite, meal intake, and acceptance/intake of ONS. Monitor nutrition-related labs, bowel function, and weight trends. Malnutrition Assessment:  Malnutrition Status: At risk for malnutrition (09/10/22 1202)    Context:  Acute Illness     Findings of the 6 clinical characteristics of malnutrition:  Energy Intake:  Mild decrease in energy intake   Weight Loss:  No significant weight loss     Body Fat Loss:  Unable to assess (COVID-19 +)     Muscle Mass Loss:  Unable to assess (COVID-19 +)    Fluid Accumulation:  No significant fluid accumulation     Strength:  Not Performed    Nutrition Assessment:    patient was nutritionally compromised upon admission AEB SOB and patient was d/c'd recently from a rehab facility after back surgery + COVID-19 virus present upon admission, however, she is improving from a nutritional standpoint AEB she consumed 51-75% of her breakfast this am and respiratory status has improved; will continue ADULT DIET;  Regular; 4 carb choices per meal and add Ensure high-protein with breakfast and dinner meals    Nutrition Related Findings:    patient is A & O x 4; she was in a rehab facility recently after having back surgery; she presented with wrosening respiratory status and tested + for COVID-19 virus upon admission; + R chest tube placed; + BM on 9/9/22; abdomen is round, soft, and bowel sounds are active; on low-dose SSI; she consumed 51-75% of her breakfast this am; respiratory status has improved since admission Wound Type: Surgical Incision (surgical incision on back from recent back surgery; R chest tube in place)       Current Nutrition Intake & Therapies:    Average Meal Intake: 51-75% (breakfast this am)  Average Supplements Intake: None Ordered  ADULT DIET; Regular; 4 carb choices (60 gm/meal)  ADULT ORAL NUTRITION SUPPLEMENT; Breakfast, Dinner; Low Calorie/High Protein Oral Supplement    Anthropometric Measures:  Height: 5' 3\" (160 cm)  Ideal Body Weight (IBW): 115 lbs (52 kg)    Admission Body Weight: 126 lb 12.2 oz (57.5 kg) (obtained on 9/10/22; actual weight)  Current Body Weight: 126 lb 12.2 oz (57.5 kg) (obtained on 9/10/22; actual weight), 110.2 % IBW.  Weight Source: Bed Scale  Current BMI (kg/m2): 22.5  Usual Body Weight: 134 lb 14 oz (61.2 kg) (obtained on 7/17/22; actual weight)  % Weight Change (Calculated): -6  Weight Adjustment For: No Adjustment                 BMI Categories: Normal Weight (BMI 22.0 to 24.9) age over 72    Estimated Daily Nutrient Needs:  Energy Requirements Based On: Kcal/kg  Weight Used for Energy Requirements: Current  Energy (kcal/day): 1311 - 1425 kcals based on 23-25 kcals/kg/CBW  Weight Used for Protein Requirements: Current  Protein (g/day): 86 - 91 g protein based on 1.5-1.6 g/kg/CBW  Method Used for Fluid Requirements: 1 ml/kcal  Fluid (ml/day): 1311 - 1425 ml    Nutrition Diagnosis:   Inadequate oral intake related to inadequate protein-energy intake, impaired respiratory function, increase demand for energy/nutrients as evidenced by poor intake prior to admission, intake 51-75%, weight loss, lab values    Nutrition Interventions:   Food and/or Nutrient Delivery: Continue Current Diet, Start Oral Nutrition Supplement  Nutrition Education/Counseling: No recommendation at this time  Coordination of Nutrition Care: Continue to monitor while inpatient, Interdisciplinary Rounds       Goals:     Goals: Meet at least 75% of estimated needs, by next RD assessment       Nutrition Monitoring and Evaluation:   Behavioral-Environmental Outcomes: None Identified  Food/Nutrient Intake Outcomes: Food and Nutrient Intake, Supplement Intake  Physical Signs/Symptoms Outcomes: Biochemical Data, Meal Time Behavior, Skin, Weight    Discharge Planning:    Continue current diet     Margo Aguirre, [address],   Contact: [phone]

## 2022-09-10 NOTE — CONSULTS
[facility]                                 Pulmonary Consult /Progress Note :                                                                  CHIEF COMPLAINT: SOB  Reason ILD with acute exacerbation. Hypoxia acute on chronic. Spontaneous sec Pneumothorax       HPI:   Patient is 25-year-old female with extensive work-up for interstitial lung disease that include bronchoscopy with EBUS as well as cryobiopsy and reviewing her records showing most likely organizing pneumonia along with possibility of nonspecific interstitial pneumonia    She used to follow with Dr. Brianda Owusu and she was placed on pirfenidone    The patient denies any history of previously smoking    She uses 3 L of home oxygen and she noticed that her oxygen requirement has been increasing over the last few days along with shortness of breath and dyspnea on exertion  Had admission in July and see n in office in August     She presented yesterday with worsening SOB and Chest pain and was + COVID,she also had pneumothorax that needed chest tube placed by ER  CXR shows no pneumothorax     She states she is feeling great   She is on Non rebreather     Past Medical History:   Diagnosis Date    A-fib ([address]     Anxiety     Cryptogenic organizing pneumonia ([address]     Depression     GERD (gastroesophageal reflux disease)     Hyperlipidemia     On home oxygen therapy     uses O2 3L prn    Rash     Thyroid disease     hypothyroidism       Past Surgical History:        Procedure Laterality Date    ARM SURGERY Left 3/2/2020    LEFT ULNAR NERVE DECOMPRESSION AT THE ELBOW performed by Evon Yusuf MD at [address] [place] [address] 6/27/2019    EBUS WF W/ANES.  performed by Deann Lo MD at [address] [place] [address]  6/27/2019    BRONCHOSCOPY/TRANSBRONCHIAL NEEDLE BIOPSY performed by Deann Lo MD at [address] [place] [address]  6/27/2019    BRONCHOSCOPY/TRANSBRONCHIAL LUNG BIOPSY performed by Jose Guadalupe Mejia MD at [facility]  6/27/2019    BRONCHOSCOPY ALVEOLAR LAVAGE performed by Jose Guadalupe Mejia MD at [address] [place] [address]  07/10/2019    BRONCHOSCOPY N/A 7/10/2019    BRONCHOSCOPY ALVEOLAR LAVAGE performed by Mei Lawrence MD at [facility] Bilateral 08/06/2019    BRONCHOSCOPY N/A 8/6/2019    BRONCHOSCOPY ALVEOLAR LAVAGE performed by Danny Laguerre MD at [address] [place] [address] N/A 12/24/2019    BRONCHOSCOPY ALVEOLAR LAVAGE performed by Laura Zelaya MD at [facility]      COLONOSCOPY N/A 8/25/2022    COLONOSCOPY POLYPECTOMY SNARE/COLD BIOPSY performed by Antwan Manzanares MD at [facility], TOTAL ABDOMINAL (CERVIX REMOVED)      partial       Allergies:  is allergic to penicillins, cefuroxime, doxycycline, imitrex [sumatriptan], meperidine, other, sulfa antibiotics, keflex [cephalexin], and tape [adhesive tape]. Social History:    TOBACCO:   reports that she has never smoked. She has never used smokeless tobacco.  ETOH:   reports no history of alcohol use.       Family History:       Problem Relation Age of Onset    Diabetes Mother     High Blood Pressure Mother     Asthma Sister     Other Father        Current Medications:    Current Facility-Administered Medications:     acetaminophen (TYLENOL) tablet 1,000 mg, 1,000 mg, Oral, Once, Lakeisha Christian MD    dexamethasone (PF) (DECADRON) injection 6 mg, 6 mg, IntraVENous, Q24H, Claritza Downs MD    albuterol sulfate HFA (PROVENTIL;VENTOLIN;PROAIR) 108 (90 Base) MCG/ACT inhaler 2 puff, 2 puff, Inhalation, Q6H PRN, Claritza Downs MD    atorvastatin (LIPITOR) tablet 40 mg, 40 mg, Oral, Daily, Claritza Downs MD    benzonatate (TESSALON) capsule 100 mg, 100 mg, Oral, Q4H PRN, Claritza Downs MD    guaiFENesin [facility]) extended release tablet 1,200 mg, 1,200 mg, Oral, QAM, Claritza Downs MD    hydrOXYzine HCl (ATARAX) tablet 10 mg, 10 mg, Oral, Q8H PRN, Dina Wadsworth MD    levothyroxine (SYNTHROID) tablet 125 mcg, 125 mcg, Oral, Daily, Dina Wadsworth MD    magnesium oxide (MAG-OX) tablet 400 mg, 400 mg, Oral, Daily, Dina Wadsworth MD    nadolol (CORGARD) tablet 20 mg, 20 mg, Oral, Daily, Dina Wadsworth MD    pantoprazole (PROTONIX) tablet 40 mg, 40 mg, Oral, QAM AC, Dina Wadsworth MD    Pirfenidone TABS 3 tablet, 3 tablet, Oral, TID, Dina Wadsworth MD    glucose chewable tablet 16 g, 4 tablet, Oral, PRN, Dina Wadsworth MD    dextrose bolus 10% 125 mL, 125 mL, IntraVENous, PRN **OR** dextrose bolus 10% 250 mL, 250 mL, IntraVENous, PRN, Dina Wadsworth MD    glucagon (rDNA) injection 1 mg, 1 mg, SubCUTAneous, PRN, Dina Wadsworth MD    dextrose 10 % infusion, , IntraVENous, Continuous PRN, Dina Wadsworth MD    insulin lispro (HUMALOG) injection vial 0-4 Units, 0-4 Units, SubCUTAneous, TID WC, Dina Wadsworth MD    insulin lispro (HUMALOG) injection vial 0-4 Units, 0-4 Units, SubCUTAneous, Nightly, Dina Wadsworth MD    sodium chloride flush 0.9 % injection 5-40 mL, 5-40 mL, IntraVENous, 2 times per day, Dina Wadsworth MD    sodium chloride flush 0.9 % injection 5-40 mL, 5-40 mL, IntraVENous, PRN, Dina Wadsworth MD    0.9 % sodium chloride infusion, , IntraVENous, PRN, Dina Wadsworth MD    enoxaparin (LOVENOX) injection 40 mg, 40 mg, SubCUTAneous, Daily, Dina Wadsworth MD    ondansetron (ZOFRAN-ODT) disintegrating tablet 4 mg, 4 mg, Oral, Q8H PRN **OR** ondansetron (ZOFRAN) injection 4 mg, 4 mg, IntraVENous, Q6H PRN, Dina Wadsworth MD    polyethylene glycol (GLYCOLAX) packet 17 g, 17 g, Oral, Daily PRN, Dina Wadsworth MD    acetaminophen (TYLENOL) tablet 650 mg, 650 mg, Oral, Q6H PRN **OR** acetaminophen (TYLENOL) suppository 650 mg, 650 mg, Rectal, Q6H PRN, Dina Wadsworth MD    morphine (PF) injection 2 mg, 2 mg, IntraVENous, Q4H PRN, Dina Wadsworth MD    morphine sulfate (PF) injection 4 mg, 4 mg, IntraVENous, Q4H PRN, Jennifer Castellanos MD, 4 mg at 09/10/22 0932    mirtazapine (REMERON) tablet 30 mg, 30 mg, Oral, Nightly PRN, Amey Lesch, MD    methocarbamol (ROBAXIN) tablet 750 mg, 750 mg, Oral, TID PRN, Amey Lesch, MD    [START ON 9/11/2022] sertraline (ZOLOFT) tablet 200 mg, 200 mg, Oral, Daily, Amey Lesch, MD    Pirfenidone TABS 3 tablet, 3 tablet, Oral, TID, Russ Salgado MD      REVIEW OF SYSTEMS:  Constitutional: Negative for fever  HENT: Negative for sore throat  Eyes: Negative for redness   Respiratory: + for dyspnea, cough,mild chest pain   Cardiovascular: Negative for chest pain  Gastrointestinal: Negative for vomiting, diarrhea   Genitourinary: Negative for hematuria   Musculoskeletal: Negative for arthralgias   Skin: Negative for rash  Neurological: Negative for syncope  Hematological: Negative for adenopathy  Psychiatric/Behavorial: Negative for anxiety      Objective:   PHYSICAL EXAM:    Blood pressure (!) 134/105, pulse (!) 104, temperature 97.8 °F (36.6 °C), temperature source Axillary, resp. rate 29, height 5' 3\" (1.6 m), weight 126 lb 12.2 oz (57.5 kg), SpO2 100 %, not currently breastfeeding.' on RA  Gen: No distress. Eyes: PERRL. No sclera icterus. No conjunctival injection. ENT: No discharge. Pharynx clear. Neck: Trachea midline. No obvious mass. Resp: Rhonchi bilaterally no clear Rales   CV: Regular rate. Regular rhythm. No murmur or rub. No edema. GI: Non-tender. Non-distended. No hernia. Skin: Warm and dry. No nodule on exposed extremities. Lymph: No cervical LAD. No supraclavicular LAD. M/S: No cyanosis. No joint deformity. No clubbing. Neuro: Awake. Alert. Moves all four extremities. Psych: Oriented x 3. No anxiety.            DATA reviewed by me:   CXR  CT             LABS/IMAGING:    CBC:  Lab Results   Component Value Date    WBC 8.2 09/09/2022    HGB 8.9 (L) 09/09/2022    HCT 27.1 (L) 09/09/2022    MCV 83.6 09/09/2022     09/09/2022 LYMPHOPCT 17.8 09/09/2022    RBC 3.24 (L) 09/09/2022    MCH 27.4 09/09/2022    MCHC 32.8 09/09/2022    RDW 17.1 (H) 09/09/2022    NEUTOPHILPCT 60.4 09/09/2022    MONOPCT 7.4 09/09/2022    BASOPCT 0.6 09/09/2022    NEUTROABS 5.0 09/09/2022    LYMPHSABS 1.5 09/09/2022    MONOSABS 0.6 09/09/2022    EOSABS 1.1 (H) 09/09/2022    BASOSABS 0.1 09/09/2022       Recent Labs     09/09/22 1954 08/29/22  0701 08/26/22  0638   WBC 8.2 5.8 6.9   HGB 8.9* 8.4* 7.6*   HCT 27.1* 26.2* 23.6*   MCV 83.6 86.4 85.6    187 186       BMP:   Recent Labs     09/09/22 1954      K 3.1*   CL 98*   CO2 32   BUN 8   CREATININE <0.5*         MG:   Lab Results   Component Value Date/Time    MG 1.60 09/09/2022 07:54 PM     Ca/Phos:   Lab Results   Component Value Date    CALCIUM 8.1 (L) 09/09/2022    PHOS 2.6 07/17/2022     LIVER PROFILE:   Recent Labs     09/09/22 1954   AST 17   ALT 9*   BILITOT 0.3   ALKPHOS 138*         PT/INR: No results for input(s): PROTIME, INR in the last 72 hours. APTT: No results for input(s): APTT in the last 72 hours. Cardiac Enzymes:  Lab Results   Component Value Date    CKTOTAL 23 (L) 07/10/2019    TROPONINI <0.01 09/09/2022       Assessment:     Acute hypoxic resp failure   COVID pneumonia  Pneumothorax   ILD . h/o of cryptogenic organizing pneumonia and possibly coexisting NSIP or chronic HP.     Acute exacerbation of ILD   History of COPD        Plan:    Chest tube to water seal  CXR this pm   Increase Decadron 6 mg bid   On pirfenidone   COVID +   I am not sure her hypoxia is from COVID as much as her current condtion with UIP/OP/ILD and will hold on toci\  *- on albuterol as needed  *-check Procl ,CRP and sputum culture   *-Bronchodilator and ICS  *-Continue her pirfenidone and we will asked  to help make sure that she get her medication at home  Had recent back surgery ,CTA negative for PE  Wean o2 as tolerated  If in  distress will try Opti flow     Thank you very much for allowing me to participate in the care of this pleasant patient , should you have any questions ,please do not hesitate to contact me      Shay Davis MD,[facility]  Pulmonary&Critical Care Medicine    Chaim Carmona    NOTE: This report was transcribed using voice recognition software. Every effort was made to ensure accuracy; however, inadvertent computerized transcription errors may be present.

## 2022-09-10 NOTE — PROCEDURES
Chest Tube Procedure Note  Procedure - Closed Tube Thoracostomy    A time-out was completed and placed in paper chart. The patient was positioned appropriately for chest tube placement. The patient's right chest was prepped and draped in sterile fashion. 1% Lidocaine was used to anesthetize the surrounding skin area. A skin incision was made in the mid-axillary line at the inframammary crease. Utilizing the 8 Malay chest tube, a subcutaneous tunnel was created  just adjacent to the superior rib. The pleural space was entered and a gush of air was observed. Chest tube was inserted and positioned appropriately. The chest tube was sutured securely to the skin and a sterile dressing applied. A pleurevac was attached to the chest tube and then attached to continuous low wall suction. Chest x-ray was obtained for proper placement. Estimated Blood Loss: 0ml      CT CHEST PULMONARY EMBOLISM W CONTRAST   Final Result   No evidence of pulmonary embolism      Right apical, anterior and lateral pneumothorax approximately 30%. Chest tube   extends along the right lateral chest wall however tip is extrapleural and   lateral to the upper ribcage      Coronary artery/atherosclerotic disease. Cardiomegaly      Extensive bullous changes and pulmonary fibrosis      Small right pleural effusion      RECOMMENDATIONS:   Re-evaluate for reinsertion of right chest tube         XR CHEST PORTABLE   Final Result   Nearly completely resolved right pneumothorax. Chest tube along the right   lateral hemithorax however tip appears extrapleural.           Chest tube placement was unsuccessful, discussed with pulmonology, despite improvement of x-ray, did still feel that chest tube was put at this time. Plan for removal of initial chest tube and reinsertion.

## 2022-09-10 NOTE — PLAN OF CARE
67f with ILD, CHF, HTN, afib, COPD, DM here with SOB on chronic 6 lpm  (+) Covid  (+) PTX, s/p chest tube placement

## 2022-09-10 NOTE — H&P
Admission [address];  MRN: [id number] ;   Admit Date: 9/9/2022  5:21 PM   Current Date and Time: 9/10/2022 7:20 AM    PCP  --  Leilani Mejia MD         Chief Complaint   Patient presents with    Shortness of Breath     Pt has a lung disease and on oxygen at home. Has been having to wear 6 liters due to sob for the past few days. States her oxygen has been dropping. 85% on her 6 liters per squad. Placed her on a non rebreather. Pt 100% on no rebreather. Pt does not appear to be in any distress. Talking in full sentences. HPI:  Patient is a 69 y.o. female with  cryptongenic organizing pneumonia, pulm fibrosis , chronic hypoxic resp failure , atrial fibrillation, diastolic CHF,  HLD, hypothyroidism, GERD, HLD, depression, anxiety, chronic back pain who presented to ER for increasing sob for last few days    Recently had spine surgery for L1 burst fracture and did rehab at [facility] and went home about a week ago . Reports she developed slow increasing sob and using upto 6 L o2 to keep her spo2 above 90 %. No fevers or chills but has significant cough. Workup in ER showed hypoxia and new right sided pneumothorax and covid positive. Right sided chest tube placed and admitted to ICU for eval     Pt reports she feels fine since her chest tube was placed. Pain controlled. No cough today   Has been taken off diuretics and diabetic meds for progressive weight loss       Allergies   Allergen Reactions    Penicillins Anaphylaxis     Tolerates Meropenem. Cefuroxime      Tolerates Meropenem. Doxycycline Hives    Imitrex [Sumatriptan] Other (See Comments)     Feels like pins and needles    Meperidine     Other Other (See Comments)     Blood pressure drops, light headed    Sulfa Antibiotics Hives    Keflex [Cephalexin] Itching and Rash     Tolerates Meropenem.     Tape Clemetine [place] Tape] Rash       Past Medical History:   Diagnosis Date    A-fib [facility])     Anxiety     Cryptogenic organizing pneumonia ([address]     Depression     GERD (gastroesophageal reflux disease)     Hyperlipidemia     On home oxygen therapy     uses O2 3L prn    Rash     Thyroid disease     hypothyroidism      Past Surgical History:   Procedure Laterality Date    ARM SURGERY Left 3/2/2020    LEFT ULNAR NERVE DECOMPRESSION AT THE ELBOW performed by Sylvain Pacheco MD at [facility] N/A 6/27/2019    EBUS WF W/ANES.  performed by Beth Mix MD at [facility]  6/27/2019    BRONCHOSCOPY/TRANSBRONCHIAL NEEDLE BIOPSY performed by Beth Mix MD at [facility]  6/27/2019    BRONCHOSCOPY/TRANSBRONCHIAL LUNG BIOPSY performed by Beth Mix MD at [facility]  6/27/2019    BRONCHOSCOPY ALVEOLAR LAVAGE performed by Beth Mix MD at [facility]  07/10/2019    BRONCHOSCOPY N/A 7/10/2019    BRONCHOSCOPY ALVEOLAR LAVAGE performed by Alexandria Lobo MD at [facility] Bilateral 08/06/2019    BRONCHOSCOPY N/A 8/6/2019    BRONCHOSCOPY ALVEOLAR LAVAGE performed by Didier Ma MD at [facility] N/A 12/24/2019    BRONCHOSCOPY ALVEOLAR LAVAGE performed by Starr Molina MD at [address] [place] [address] N/A 8/25/2022    COLONOSCOPY POLYPECTOMY SNARE/COLD BIOPSY performed by Gissell Chaney MD at . [address], TOTAL ABDOMINAL (CERVIX REMOVED)      partial      Medications Prior to Admission: hydrocortisone (ANUSOL-HC) 25 MG suppository, Place 1 suppository rectally 2 times daily for 10 days  magnesium oxide (MAG-OX) 400 (240 Mg) MG tablet, Take 1 tablet by mouth daily  methocarbamol (ROBAXIN) 750 MG tablet, Take 1 tablet by mouth 3 times daily  potassium chloride (KLOR-CON) 10 MEQ extended release tablet, Take 1 tablet by mouth 2 times daily  polycarbophil (FIBERCON) 625 MG tablet, Take 1 tablet by mouth at bedtime  Ergocalciferol (VITAMIN D) 91843 units CAPS, Take 50,000 Units by mouth once a week  Pirfenidone 267 MG TABS, Take 3 tablets by mouth in the morning, at noon, and at bedtime  albuterol sulfate HFA (PROVENTIL;VENTOLIN;PROAIR) 108 (90 Base) MCG/ACT inhaler, INHALE 2 PUFFS INTO THE LUNGS EVERY 6 HOURS AS NEEDED FOR WHEEZING  atorvastatin (LIPITOR) 40 MG tablet, Take 1 tablet by mouth daily  levothyroxine (SYNTHROID) 125 MCG tablet, Take 1 tablet by mouth Daily TAKE 1 TABLET BY MOUTH EVERY DAY  sertraline (ZOLOFT) 100 MG tablet, Take 2 tablets by mouth daily  mirtazapine (REMERON) 30 MG tablet, Take 1 tablet by mouth nightly  nadolol (CORGARD) 20 MG tablet, Take 1 tablet by mouth daily  pantoprazole (PROTONIX) 40 MG tablet, Take 1 tablet by mouth every morning (before breakfast)  loratadine-pseudoephedrine ([employer] ALLERGY RELIEF-D12) 5-120 MG per extended release tablet, TAKE 1 TABLET BY MOUTH TWICE A DAY  benzonatate (TESSALON) 200 MG capsule, TAKE 1 CAPSULE BY MOUTH 3 TIMES A DAY AS NEEDED FOR COUGH  hydrOXYzine (ATARAX) 10 MG tablet, Take 1 tablet by mouth every 8 hours as needed for Itching TAKE 1 TO 3 TABLETS BY MOUTH 3 TIMES DAILY AS NEEDED FOR ITCHING  acetaminophen (TYLENOL) 325 MG tablet,   guaiFENesin (MUCINEX) 600 MG extended release tablet, Take 1,200 mg by mouth every morning  albuterol (PROVENTIL) (2.5 MG/3ML) 0.083% nebulizer solution, INHALE THE CONTENTS OF 1 VIAL VIA NEBULIZER EVERY 6 HOURS AS NEEDED FOR WHEEZING  Blood Glucose Monitoring Suppl (TRUE METRIX METER) PUJA, Test daily and as needed  blood glucose test strips (ASCENSIA AUTODISC VI;ONE TOUCH ULTRA TEST VI) strip, 1 each by In Vitro route daily As needed. TRUEplus Lancets 33G MISC, Use daily  Blood Glucose Calibration (TRUE METRIX LEVEL 1) Low SOLN, use as needed  INSULIN SYRINGE 1CC/29G 29G X 1/2\" 1 ML MISC, 1 each by Does not apply route 2 times daily  OXYGEN, Inhale 3 L into the lungs  Allergies   Allergen Reactions    Penicillins Anaphylaxis     Tolerates Meropenem. 07:54 PM    HCT 27.1 09/09/2022 07:54 PM    MCV 83.6 09/09/2022 07:54 PM     09/09/2022 07:54 PM    NEUTOPHILPCT 60.4 09/09/2022 07:54 PM    LYMPHOPCT 17.8 09/09/2022 07:54 PM    MONOPCT 7.4 09/09/2022 07:54 PM    BASOPCT 0.6 09/09/2022 07:54 PM    NEUTROABS 5.0 09/09/2022 07:54 PM    LYMPHSABS 1.5 09/09/2022 07:54 PM    MONOSABS 0.6 09/09/2022 07:54 PM    EOSABS 1.1 09/09/2022 07:54 PM    BASOSABS 0.1 09/09/2022 07:54 PM     BMP:   Lab Results   Component Value Date/Time     09/09/2022 07:54 PM    K 3.1 09/09/2022 07:54 PM    CO2 32 09/09/2022 07:54 PM    BUN 8 09/09/2022 07:54 PM    CREATININE <0.5 09/09/2022 07:54 PM    CALCIUM 8.1 09/09/2022 07:54 PM     Coagulation:   Lab Results   Component Value Date/Time    INR 1.17 07/10/2019 10:00 AM    APTT 25.7 06/16/2014 12:40 PM     Cardiac markers:   Lab Results   Component Value Date/Time    CKTOTAL 23 07/10/2019 04:11 PM    TROPONINI <0.01 09/09/2022 07:54 PM     ABGs: No results found for: POCPH, POCPCO2, POCPO2, POCHCO3, POCTCO2, POCFIO2    Lab Results   Component Value Date    ALT 9 (L) 09/09/2022    AST 17 09/09/2022    ALKPHOS 138 (H) 09/09/2022    BILITOT 0.3 09/09/2022       Lab Results   Component Value Date    INR 1.17 (H) 07/10/2019    INR 1.38 (H) 06/27/2019    INR 1.42 (H) 06/26/2019    PROTIME 13.3 (H) 07/10/2019    PROTIME 15.7 (H) 06/27/2019    PROTIME 16.0 (H) 06/26/2019         EKG:sinus tachycardia     Radiology:    Chest xray  Interval placement of a locking loop chest tube at the right lung apex with   resolved pneumothorax    CTA chest  No evidence of pulmonary embolism     Right apical, anterior and lateral pneumothorax approximately 30%. Chest tube   extends along the right lateral chest wall however tip is extrapleural and   lateral to the upper ribcage     Coronary artery/atherosclerotic disease.   Cardiomegaly     Extensive bullous changes and pulmonary fibrosis     Small right pleural effusion     Cxr    Nearly completely resolved right pneumothorax. Chest tube along the right   lateral hemithorax however tip appears extrapleural.    Cxr     Moderate right-sided pneumothorax with approximately 4 cm of pleural   separation at the lung apex. No definite evidence of tension morphology at   this time. 2.  Chronic interstitial lung disease     Pertinent previous results reviewed      Echocardiogram from 5/6/22      Summary   Technically difficult examination. Apical views are off axis secondary to pt   left ribs fracture. Left ventricular systolic function is normal with ejection fraction   estimated at 55%. No regional wall motion abnormalities. There is mild concentric left ventricular hypertrophy. Indeterminate left ventricular filling pressure. Systolic pulmonary artery pressure (SPAP) is normal estimated at 22 mmHg   (Right atrial pressure of 8 mmHg). ASSESMENT & PLAN:     Acute on chronic respiratory failure  Hx of ILD on 3 L at home  Covid 19 infection   Right pneumothorax    -pt with hx of Pulmonary fibrosis  and Hx of cryptogenic organizing pneumonia with chronic resp failure with recurrent admissions  - imaging with right sided pneumothorax likely induced by cough s.p chest tube placement and resolved on repeat imaging.  CT to water seal today   - doubt hypoxia due to covid , as it improved since chest tube is placed   - can wean steroids- resumed Pirfenidone   -Pulmonology consulted      Chronic diastolic CHF  last echo as above, from 5/6/22 EF 55%  -BNP mildly elevated  - pt was taken of diuretics recently for weight loss   - monitor for fluid overload and resume lasix as needed    S.p recent L1 burst fracture -  s/p T11-L3 PDFF with L1 corpectomy at  ( 8/22)    #DM type 2  -hHas been taken off diuretics and diabetic meds for progressive weight loss   - hyperglycemia with steroids  -SSI  -continue to monitor BG     #HTN  -BP stable  -on nadolol     #HLD  -continue statin     #Hypothyroidism  -continue synthroid     #Iron Deficiency Anemia     Recent Hemorrhoidal bleed -recent colonoscopy neg  - hb at 8  -continue PO iron  -Hgb stable     #Depression  #Anxiety  -continue zoloft  -prn ativan    Diet: diabetic   GI/DVT PX: /lovenox  CODE STATUS: full code     Liberty Bonilla for [facility]    Jonathan Wilson MD,  9/10/2022 7:20 AM

## 2022-09-11 ENCOUNTER — APPOINTMENT (OUTPATIENT)
Dept: GENERAL RADIOLOGY | Age: 67
DRG: 177 | End: 2022-09-11
Payer: MEDICARE

## 2022-09-11 LAB
ANION GAP SERPL CALCULATED.3IONS-SCNC: 5 MMOL/L (ref 3–16)
BASOPHILS ABSOLUTE: 0 K/UL (ref 0–0.2)
BASOPHILS RELATIVE PERCENT: 0.1 %
BUN BLDV-MCNC: 5 MG/DL (ref 7–20)
CALCIUM SERPL-MCNC: 8 MG/DL (ref 8.3–10.6)
CHLORIDE BLD-SCNC: 98 MMOL/L (ref 99–110)
CO2: 33 MMOL/L (ref 21–32)
CREAT SERPL-MCNC: <0.5 MG/DL (ref 0.6–1.2)
EOSINOPHILS ABSOLUTE: 0 K/UL (ref 0–0.6)
EOSINOPHILS RELATIVE PERCENT: 0.5 %
ESTIMATED AVERAGE GLUCOSE: 93.9 MG/DL
GFR AFRICAN AMERICAN: >60
GFR NON-AFRICAN AMERICAN: >60
GLUCOSE BLD-MCNC: 119 MG/DL (ref 70–99)
GLUCOSE BLD-MCNC: 135 MG/DL (ref 70–99)
GLUCOSE BLD-MCNC: 83 MG/DL (ref 70–99)
GLUCOSE BLD-MCNC: 87 MG/DL (ref 70–99)
GLUCOSE BLD-MCNC: 96 MG/DL (ref 70–99)
HBA1C MFR BLD: 4.9 %
HCT VFR BLD CALC: 26.4 % (ref 36–48)
HEMOGLOBIN: 8.4 G/DL (ref 12–16)
LYMPHOCYTES ABSOLUTE: 0.6 K/UL (ref 1–5.1)
LYMPHOCYTES RELATIVE PERCENT: 12.8 %
MCH RBC QN AUTO: 26.3 PG (ref 26–34)
MCHC RBC AUTO-ENTMCNC: 31.7 G/DL (ref 31–36)
MCV RBC AUTO: 83.1 FL (ref 80–100)
MONOCYTES ABSOLUTE: 0.1 K/UL (ref 0–1.3)
MONOCYTES RELATIVE PERCENT: 2.8 %
NEUTROPHILS ABSOLUTE: 4.2 K/UL (ref 1.7–7.7)
NEUTROPHILS RELATIVE PERCENT: 83.8 %
PDW BLD-RTO: 16.8 % (ref 12.4–15.4)
PERFORMED ON: ABNORMAL
PERFORMED ON: NORMAL
PLATELET # BLD: 248 K/UL (ref 135–450)
PMV BLD AUTO: 7 FL (ref 5–10.5)
POTASSIUM REFLEX MAGNESIUM: 3.8 MMOL/L (ref 3.5–5.1)
PROCALCITONIN: 0.05 NG/ML (ref 0–0.15)
RBC # BLD: 3.18 M/UL (ref 4–5.2)
SODIUM BLD-SCNC: 136 MMOL/L (ref 136–145)
WBC # BLD: 5 K/UL (ref 4–11)

## 2022-09-11 PROCEDURE — 6370000000 HC RX 637 (ALT 250 FOR IP): Performed by: HOSPITALIST

## 2022-09-11 PROCEDURE — 99233 SBSQ HOSP IP/OBS HIGH 50: CPT | Performed by: INTERNAL MEDICINE

## 2022-09-11 PROCEDURE — 85025 COMPLETE CBC W/AUTO DIFF WBC: CPT

## 2022-09-11 PROCEDURE — 94761 N-INVAS EAR/PLS OXIMETRY MLT: CPT

## 2022-09-11 PROCEDURE — 80048 BASIC METABOLIC PNL TOTAL CA: CPT

## 2022-09-11 PROCEDURE — 2580000003 HC RX 258: Performed by: HOSPITALIST

## 2022-09-11 PROCEDURE — 84145 PROCALCITONIN (PCT): CPT

## 2022-09-11 PROCEDURE — 6360000002 HC RX W HCPCS: Performed by: HOSPITALIST

## 2022-09-11 PROCEDURE — 2060000000 HC ICU INTERMEDIATE R&B

## 2022-09-11 PROCEDURE — 6370000000 HC RX 637 (ALT 250 FOR IP): Performed by: INTERNAL MEDICINE

## 2022-09-11 PROCEDURE — 71045 X-RAY EXAM CHEST 1 VIEW: CPT

## 2022-09-11 PROCEDURE — 94640 AIRWAY INHALATION TREATMENT: CPT

## 2022-09-11 PROCEDURE — 2700000000 HC OXYGEN THERAPY PER DAY

## 2022-09-11 PROCEDURE — 36415 COLL VENOUS BLD VENIPUNCTURE: CPT

## 2022-09-11 PROCEDURE — 6360000002 HC RX W HCPCS: Performed by: INTERNAL MEDICINE

## 2022-09-11 RX ORDER — ALBUTEROL SULFATE 90 UG/1
2 AEROSOL, METERED RESPIRATORY (INHALATION) 4 TIMES DAILY
Status: DISCONTINUED | OUTPATIENT
Start: 2022-09-11 | End: 2022-09-15

## 2022-09-11 RX ADMIN — PANTOPRAZOLE SODIUM 40 MG: 40 TABLET, DELAYED RELEASE ORAL at 10:31

## 2022-09-11 RX ADMIN — SODIUM CHLORIDE, PRESERVATIVE FREE 10 ML: 5 INJECTION INTRAVENOUS at 10:31

## 2022-09-11 RX ADMIN — DEXAMETHASONE SODIUM PHOSPHATE 6 MG: 10 INJECTION, SOLUTION INTRAMUSCULAR; INTRAVENOUS at 22:19

## 2022-09-11 RX ADMIN — LEVOTHYROXINE SODIUM 125 MCG: 125 TABLET ORAL at 10:31

## 2022-09-11 RX ADMIN — MORPHINE SULFATE 4 MG: 4 INJECTION, SOLUTION INTRAMUSCULAR; INTRAVENOUS at 20:41

## 2022-09-11 RX ADMIN — Medication 2 PUFF: at 14:56

## 2022-09-11 RX ADMIN — ATORVASTATIN CALCIUM 40 MG: 40 TABLET, FILM COATED ORAL at 10:31

## 2022-09-11 RX ADMIN — PIRFENIDONE 3 TABLET: 267 TABLET, FILM COATED ORAL at 13:58

## 2022-09-11 RX ADMIN — MORPHINE SULFATE 4 MG: 4 INJECTION, SOLUTION INTRAMUSCULAR; INTRAVENOUS at 07:06

## 2022-09-11 RX ADMIN — BENZONATATE 100 MG: 100 CAPSULE ORAL at 22:18

## 2022-09-11 RX ADMIN — GUAIFENESIN 1200 MG: 600 TABLET, EXTENDED RELEASE ORAL at 10:30

## 2022-09-11 RX ADMIN — PIRFENIDONE 3 TABLET: 267 TABLET, FILM COATED ORAL at 10:32

## 2022-09-11 RX ADMIN — MAGNESIUM GLUCONATE 500 MG ORAL TABLET 400 MG: 500 TABLET ORAL at 10:31

## 2022-09-11 RX ADMIN — PIRFENIDONE 3 TABLET: 267 TABLET, FILM COATED ORAL at 20:40

## 2022-09-11 RX ADMIN — Medication 2 PUFF: at 20:18

## 2022-09-11 RX ADMIN — DEXAMETHASONE SODIUM PHOSPHATE 6 MG: 10 INJECTION, SOLUTION INTRAMUSCULAR; INTRAVENOUS at 13:58

## 2022-09-11 RX ADMIN — SERTRALINE 200 MG: 100 TABLET, FILM COATED ORAL at 10:31

## 2022-09-11 RX ADMIN — MORPHINE SULFATE 4 MG: 4 INJECTION, SOLUTION INTRAMUSCULAR; INTRAVENOUS at 16:10

## 2022-09-11 RX ADMIN — ENOXAPARIN SODIUM 40 MG: 100 INJECTION SUBCUTANEOUS at 10:30

## 2022-09-11 RX ADMIN — Medication 2 PUFF: at 08:45

## 2022-09-11 RX ADMIN — NADOLOL 20 MG: 40 TABLET ORAL at 10:32

## 2022-09-11 ASSESSMENT — PAIN DESCRIPTION - ORIENTATION
ORIENTATION: RIGHT
ORIENTATION: RIGHT

## 2022-09-11 ASSESSMENT — PAIN DESCRIPTION - DESCRIPTORS
DESCRIPTORS: ACHING
DESCRIPTORS: ACHING;SORE

## 2022-09-11 ASSESSMENT — PAIN SCALES - GENERAL
PAINLEVEL_OUTOF10: 7
PAINLEVEL_OUTOF10: 2
PAINLEVEL_OUTOF10: 7

## 2022-09-11 ASSESSMENT — PAIN DESCRIPTION - ONSET: ONSET: ON-GOING

## 2022-09-11 ASSESSMENT — PAIN DESCRIPTION - PAIN TYPE: TYPE: SURGICAL PAIN

## 2022-09-11 ASSESSMENT — PAIN - FUNCTIONAL ASSESSMENT: PAIN_FUNCTIONAL_ASSESSMENT: PREVENTS OR INTERFERES SOME ACTIVE ACTIVITIES AND ADLS

## 2022-09-11 ASSESSMENT — PAIN DESCRIPTION - LOCATION
LOCATION: CHEST
LOCATION: CHEST

## 2022-09-11 ASSESSMENT — PAIN DESCRIPTION - FREQUENCY: FREQUENCY: CONTINUOUS

## 2022-09-11 NOTE — PROGRESS NOTES
09/10/22 2200   RT Protocol   History Pulmonary Disease 2   Respiratory pattern 0   Breath sounds 2   Cough 0   Indications for Bronchodilator Therapy On home bronchodilators   Bronchodilator Assessment Score 4

## 2022-09-11 NOTE — PROGRESS NOTES
RT Inhaler-Nebulizer Bronchodilator Protocol Note    There is a bronchodilator order in the chart from a provider indicating to follow the RT Bronchodilator Protocol and there is an Initiate RT Inhaler-Nebulizer Bronchodilator Protocol order as well (see protocol at bottom of note). CXR Findings:  XR CHEST PORTABLE    Result Date: 9/11/2022  Partial retraction of right chest tube. Distal side port may be extra thoracic. There is perhaps minimal subcutaneous air. Small right apical pneumothorax, decreased in the interval. Stable extensive interstitial pattern. XR CHEST PORTABLE    Result Date: 9/10/2022  Increased size of right-sided pneumothorax measuring 1.6 cm with stable positioning of the right apically oriented pigtail thoracostomy tube. The findings were sent to the [facility] Results Communication Team at 15:57 on 9/10/2022 to be communicated to a licensed caregiver. XR CHEST PORTABLE    Result Date: 9/10/2022  Right chest tube is in normal position. No pneumothorax is evident. Interstitial opacities, correlating with extensive emphysematous disease. XR CHEST PORTABLE    Result Date: 9/10/2022  Interval placement of a locking loop chest tube at the right lung apex with resolved pneumothorax. XR CHEST PORTABLE    Result Date: 9/9/2022  Nearly completely resolved right pneumothorax. Chest tube along the right lateral hemithorax however tip appears extrapleural.     XR CHEST PORTABLE    Result Date: 9/9/2022  1. Moderate right-sided pneumothorax with approximately 4 cm of pleural separation at the lung apex. No definite evidence of tension morphology at this time.  2.  Chronic interstitial lung disease       The findings from the last RT Protocol Assessment were as follows:   History Pulmonary Disease: (P) Chronic pulmonary disease  Respiratory Pattern: (P) Dyspnea on exertion or RR 21-25 bpm  Breath Sounds: (P) Slightly diminished and/or crackles  Cough: (P) Weak, productive  Indication for order(s) to one equivalent RT bronchodilator order with QID Frequency and an Albuterol order with Frequency of every 4 hours PRN wheezing or increased work of breathing using Per Protocol order mode. Greater than 13 - enter or revise RT Bronchodilator order(s) to one equivalent RT bronchodilator order with every 4 hours Frequency and an Albuterol order with Frequency of every 2 hours PRN wheezing or increased work of breathing using Per Protocol order mode. RT to enter RT Home Evaluation for COPD & MDI Assessment order using Per Protocol order mode.     Electronically signed by Hilary Corley RCP on 9/11/2022 at 4:23 PM

## 2022-09-11 NOTE — PROGRESS NOTES
AM assessment completed. See flowsheet. A/O x 4. States she feels better this AM. Requesting to sleep and hold meds until later. Lungs sounds diminished throughout. NSR on bedside monitor. Bowel sounds active. No edema noted. Urinary continence. Labs reviewed. Cont to monitor.

## 2022-09-11 NOTE — PROGRESS NOTES
[facility]                                 Pulmonary Consult /Progress Note :                                                                  CHIEF COMPLAINT: SOB    Follow up on spontaneous  pneumothorax   HPI:   She is doing much better  On 4 L   Minimal air leak   Had diet       Objective:   PHYSICAL EXAM:    Blood pressure 132/79, pulse 67, temperature 98.8 °F (37.1 °C), temperature source Oral, resp. rate 15, height 5' 3\" (1.6 m), weight 126 lb 12.2 oz (57.5 kg), SpO2 100 %, not currently breastfeeding.' on RA  Gen: No distress. Eyes: PERRL. No sclera icterus. No conjunctival injection. ENT: No discharge. Pharynx clear. Neck: Trachea midline. No obvious mass. Resp: Rhonchi bilaterally no clear Rales   CV: Regular rate. Regular rhythm. No murmur or rub. No edema. GI: Non-tender. Non-distended. No hernia. Skin: Warm and dry. No nodule on exposed extremities. Lymph: No cervical LAD. No supraclavicular LAD. M/S: No cyanosis. No joint deformity. No clubbing. Neuro: Awake. Alert. Moves all four extremities. Psych: Oriented x 3. No anxiety.            DATA reviewed by me:   CXR  CT             LABS/IMAGING:    CBC:  Lab Results   Component Value Date    WBC 5.0 09/11/2022    HGB 8.4 (L) 09/11/2022    HCT 26.4 (L) 09/11/2022    MCV 83.1 09/11/2022     09/11/2022    LYMPHOPCT 12.8 09/11/2022    RBC 3.18 (L) 09/11/2022    MCH 26.3 09/11/2022    MCHC 31.7 09/11/2022    RDW 16.8 (H) 09/11/2022    NEUTOPHILPCT 83.8 09/11/2022    MONOPCT 2.8 09/11/2022    BASOPCT 0.1 09/11/2022    NEUTROABS 4.2 09/11/2022    LYMPHSABS 0.6 (L) 09/11/2022    MONOSABS 0.1 09/11/2022    EOSABS 0.0 09/11/2022    BASOSABS 0.0 09/11/2022       Recent Labs     09/11/22  0419 09/09/22 1954 08/29/22  0701   WBC 5.0 8.2 5.8   HGB 8.4* 8.9* 8.4*   HCT 26.4* 27.1* 26.2*   MCV 83.1 83.6 86.4    258 187         BMP:   Recent Labs     09/09/22 1954 09/11/22  0419    136   K 3.1* 3.8   CL 98* 98*   CO2 32 33*   BUN 8 5*   CREATININE <0.5* <0.5*           MG:   Lab Results   Component Value Date/Time    MG 1.60 09/09/2022 07:54 PM     Ca/Phos:   Lab Results   Component Value Date    CALCIUM 8.0 (L) 09/11/2022    PHOS 2.6 07/17/2022     LIVER PROFILE:   Recent Labs     09/09/22 1954   AST 17   ALT 9*   BILITOT 0.3   ALKPHOS 138*           PT/INR: No results for input(s): PROTIME, INR in the last 72 hours. APTT: No results for input(s): APTT in the last 72 hours. Cardiac Enzymes:  Lab Results   Component Value Date    CKTOTAL 23 (L) 07/10/2019    TROPONINI <0.01 09/09/2022       Assessment:     Acute hypoxic resp failure   COVID pneumonia  Pneumothorax   ILD . h/o of cryptogenic organizing pneumonia and possibly coexisting NSIP or chronic HP. Acute exacerbation of ILD   History of COPD        Plan:    Chest tube to wall suction ,pneumo increased when placed to water seal,so keep to wall suction today ,serial CXR   CXR small apical pneum 0.5 cm    Decadron 6 mg bid wean to daily next 1-2 days   On pirfenidone   COVID +   I am not sure her hypoxia is from [place] as much as her current condtion with UIP/OP/ILD and will hold on toci,also oxygenation improved   *- on albuterol as needed  *-check Procl ,CRP and sputum culture pending ,follow   *-Bronchodilator and ICS  Had recent back surgery ,CTA negative for PE  Wean o2 as tolerated  Keep in ICU     Thank you very much for allowing me to participate in the care of this pleasant patient , should you have any questions ,please do not hesitate to contact me      Mirta Davis MD,[facility]  Pulmonary&Critical Care Medicine    Radha Yanez    NOTE: This report was transcribed using voice recognition software. Every effort was made to ensure accuracy; however, inadvertent computerized transcription errors may be present.

## 2022-09-11 NOTE — PROGRESS NOTES
IM Progress Note    Admit Date:  9/9/2022  1    Interval history:  ILD with right pneumothorax, covid positive  S.p chest tube placement , back to home o2 of 4 L     Subjective:  Ms. Tamar Cohen reports being tired but no worsening sob, Air leak in chest tube atrium noted   Remains on home level of 4 L     Objective:   BP (!) 149/95   Pulse 99   Temp 98.8 °F (37.1 °C) (Oral)   Resp 24   Ht 5' 3\" (1.6 m)   Wt 126 lb 12.2 oz (57.5 kg)   SpO2 98%   BMI 22.46 kg/m²     Intake/Output Summary (Last 24 hours) at 9/11/2022 0753  Last data filed at 9/11/2022 0657  Gross per 24 hour   Intake 240 ml   Output 1255 ml   Net -1015 ml     :Physical Exam:  General:  thin elderly female chronically ill appearing  Awake, alert and oriented. Appears to be not in any distress  Mucous Membranes:  Pink , anicteric  Neck: No JVD, no carotid bruit, no thyromegaly  Chest:  diminished in bases with scattered crackles , right sided lateral chest tube   With no subcut emphysema   Cardiovascular:  RRR S1S2 heard, no murmurs or gallops  Abdomen:  Soft, undistended, non tender, no organomegaly, BS present  Extremities: No edema or cyanosis.  Distal pulses well felt  Neurological : no focal deficits    Medications:   Scheduled Medications:    acetaminophen  1,000 mg Oral Once    atorvastatin  40 mg Oral Daily    guaiFENesin  1,200 mg Oral QAM    levothyroxine  125 mcg Oral Daily    magnesium oxide  400 mg Oral Daily    nadolol  20 mg Oral Daily    pantoprazole  40 mg Oral QAM AC    insulin lispro  0-4 Units SubCUTAneous TID WC    insulin lispro  0-4 Units SubCUTAneous Nightly    sodium chloride flush  5-40 mL IntraVENous 2 times per day    enoxaparin  40 mg SubCUTAneous Daily    sertraline  200 mg Oral Daily    Pirfenidone  3 tablet Oral TID    dexamethasone  6 mg IntraVENous Q12H    albuterol sulfate HFA  2 puff Inhalation BID     I   dextrose      sodium chloride       albuterol sulfate HFA, benzonatate, hydrOXYzine HCl, glucose, dextrose bolus **OR** dextrose bolus, glucagon (rDNA), dextrose, sodium chloride flush, sodium chloride, ondansetron **OR** ondansetron, polyethylene glycol, acetaminophen **OR** acetaminophen, morphine, morphine, mirtazapine, methocarbamol    Lab Data:  Recent Labs     09/09/22 1954 09/11/22 0419   WBC 8.2 5.0   HGB 8.9* 8.4*   HCT 27.1* 26.4*   MCV 83.6 83.1    248     Recent Labs     09/09/22 1954 09/11/22 0419    136   K 3.1* 3.8   CL 98* 98*   CO2 32 33*   BUN 8 5*   CREATININE <0.5* <0.5*     Recent Labs     09/09/22 1954   TROPONINI <0.01       Coagulation:   Lab Results   Component Value Date/Time    INR 1.17 07/10/2019 10:00 AM    APTT 25.7 06/16/2014 12:40 PM     Cardiac markers:   Lab Results   Component Value Date/Time    CKTOTAL 23 07/10/2019 04:11 PM    TROPONINI <0.01 09/09/2022 07:54 PM         Lab Results   Component Value Date    ALT 9 (L) 09/09/2022    AST 17 09/09/2022    ALKPHOS 138 (H) 09/09/2022    BILITOT 0.3 09/09/2022       Lab Results   Component Value Date    INR 1.17 (H) 07/10/2019    INR 1.38 (H) 06/27/2019    INR 1.42 (H) 06/26/2019    PROTIME 13.3 (H) 07/10/2019    PROTIME 15.7 (H) 06/27/2019    PROTIME 16.0 (H) 06/26/2019       Radiology       EKG:sinus tachycardia      Radiology:     Cxr    Partial retraction of right chest tube. Distal side port may be extra   thoracic. There is perhaps minimal subcutaneous air. Small right apical pneumothorax, decreased in the interval.     Stable extensive interstitial pattern. Chest xray  Interval placement of a locking loop chest tube at the right lung apex with   resolved pneumothorax         CTA chest  No evidence of pulmonary embolism     Right apical, anterior and lateral pneumothorax approximately 30%. Chest tube   extends along the right lateral chest wall however tip is extrapleural and   lateral to the upper ribcage     Coronary artery/atherosclerotic disease.   Cardiomegaly     Extensive bullous changes and pulmonary fibrosis     Small right pleural effusion      Cxr     Nearly completely resolved right pneumothorax. Chest tube along the right   lateral hemithorax however tip appears extrapleural.     Cxr      Moderate right-sided pneumothorax with approximately 4 cm of pleural   separation at the lung apex. No definite evidence of tension morphology at   this time. 2.  Chronic interstitial lung disease      Pertinent previous results reviewed      Echocardiogram from 5/6/22      Summary   Technically difficult examination. Apical views are off axis secondary to pt   left ribs fracture. Left ventricular systolic function is normal with ejection fraction   estimated at 55%. No regional wall motion abnormalities. There is mild concentric left ventricular hypertrophy. Indeterminate left ventricular filling pressure. Systolic pulmonary artery pressure (SPAP) is normal estimated at 22 mmHg   (Right atrial pressure of 8 mmHg). ASSESMENT & PLAN:      Acute on chronic respiratory failure  Hx of ILD on 3 L at home  Covid 19 infection   Right pneumothorax     -pt with hx of Pulmonary fibrosis  and Hx of cryptogenic organizing pneumonia with chronic resp failure with recurrent admissions  - imaging with right sided pneumothorax likely induced by cough s.p chest tube placement and resolved on repeat imaging.  CT to water seal today   - doubt hypoxia due to covid , as it improved since chest tube is placed and now back on home level   - can wean steroids- resumed Pirfenidone   -Pulmonology consulted        Chronic diastolic CHF  last echo as above, from 5/6/22 EF 55%  -BNP mildly elevated  - pt was taken of diuretics recently for weight loss   - monitor for fluid overload and resume lasix as needed only      S.p recent L1 burst fracture -  s/p T11-L3 PDFF with L1 corpectomy at  ( 8/22)     #DM type 2  -hHas been taken off diuretics and diabetic meds for progressive weight loss   - hyperglycemia with steroids  -SSI  -continue to monitor BG     #HTN  -BP stable  -on nadolol     #HLD  -continue statin     #Hypothyroidism  -continue synthroid     #Iron Deficiency Anemia     Recent Hemorrhoidal bleed -recent colonoscopy neg  - hb at 8 .4  -continue PO iron  -Hgb stable     #Depression  #Anxiety  -continue zoloft  -prn ativan     Diet: diabetic   GI/DVT PX: /lovenox  CODE STATUS: full code      Cayla Gee for [facility]       Allan Collins MD, 9/11/2022 7:53 AM

## 2022-09-11 NOTE — PROGRESS NOTES
bronchodilators  Bronchodilator Assessment Score: 4    Aerosolized bronchodilator medication orders have been revised according to the RT Inhaler-Nebulizer Bronchodilator Protocol below. Respiratory Therapist to perform RT Therapy Protocol Assessment initially then follow the protocol. Repeat RT Therapy Protocol Assessment PRN for score 0-3 or on second treatment, BID, and PRN for scores above 3. No Indications - adjust the frequency to every 6 hours PRN wheezing or bronchospasm, if no treatments needed after 48 hours then discontinue using Per Protocol order mode. If indication present, adjust the RT bronchodilator orders based on the Bronchodilator Assessment Score as indicated below. Use Inhaler orders unless patient has one or more of the following: on home nebulizer, not able to hold breath for 10 seconds, is not alert and oriented, cannot activate and use MDI correctly, or respiratory rate 25 breaths per minute or more, then use the equivalent nebulizer order(s) with same Frequency and PRN reasons based on the score. If a patient is on this medication at home then do not decrease Frequency below that used at home. 0-3 - enter or revise RT bronchodilator order(s) to equivalent RT Bronchodilator order with Frequency of every 4 hours PRN for wheezing or increased work of breathing using Per Protocol order mode. 4-6 - enter or revise RT Bronchodilator order(s) to two equivalent RT bronchodilator orders with one order with BID Frequency and one order with Frequency of every 4 hours PRN wheezing or increased work of breathing using Per Protocol order mode. 7-10 - enter or revise RT Bronchodilator order(s) to two equivalent RT bronchodilator orders with one order with TID Frequency and one order with Frequency of every 4 hours PRN wheezing or increased work of breathing using Per Protocol order mode.        11-13 - enter or revise RT Bronchodilator order(s) to one equivalent RT bronchodilator order with QID Frequency and an Albuterol order with Frequency of every 4 hours PRN wheezing or increased work of breathing using Per Protocol order mode. Greater than 13 - enter or revise RT Bronchodilator order(s) to one equivalent RT bronchodilator order with every 4 hours Frequency and an Albuterol order with Frequency of every 2 hours PRN wheezing or increased work of breathing using Per Protocol order mode.        Electronically signed by Rico Begum RCP on 9/10/2022 at 10:05 PM

## 2022-09-11 NOTE — PROGRESS NOTES
Shift assessment complete- see flowsheets. Pt is A&Ox4. VSS    Respirations are easy, even and unlabored. Pt is on 4L via NC.    R chest tube to water seal. No air leak. No crepitus. Dressing C/D/I. PIV WDL. Flushed at this time. Plan of care and goals reviewed. Call light within reach. Bed in lowest position with wheels locked.

## 2022-09-12 ENCOUNTER — APPOINTMENT (OUTPATIENT)
Dept: GENERAL RADIOLOGY | Age: 67
DRG: 177 | End: 2022-09-12
Payer: MEDICARE

## 2022-09-12 PROBLEM — J93.12 SECONDARY SPONTANEOUS PNEUMOTHORAX: Status: ACTIVE | Noted: 2022-09-12

## 2022-09-12 LAB
GLUCOSE BLD-MCNC: 101 MG/DL (ref 70–99)
GLUCOSE BLD-MCNC: 114 MG/DL (ref 70–99)
GLUCOSE BLD-MCNC: 131 MG/DL (ref 70–99)
GLUCOSE BLD-MCNC: 138 MG/DL (ref 70–99)
PERFORMED ON: ABNORMAL

## 2022-09-12 PROCEDURE — 6360000002 HC RX W HCPCS: Performed by: HOSPITALIST

## 2022-09-12 PROCEDURE — 2580000003 HC RX 258: Performed by: INTERNAL MEDICINE

## 2022-09-12 PROCEDURE — 6370000000 HC RX 637 (ALT 250 FOR IP): Performed by: INTERNAL MEDICINE

## 2022-09-12 PROCEDURE — 2700000000 HC OXYGEN THERAPY PER DAY

## 2022-09-12 PROCEDURE — 6360000002 HC RX W HCPCS: Performed by: INTERNAL MEDICINE

## 2022-09-12 PROCEDURE — 2580000003 HC RX 258: Performed by: HOSPITALIST

## 2022-09-12 PROCEDURE — 94640 AIRWAY INHALATION TREATMENT: CPT

## 2022-09-12 PROCEDURE — 2060000000 HC ICU INTERMEDIATE R&B

## 2022-09-12 PROCEDURE — 94761 N-INVAS EAR/PLS OXIMETRY MLT: CPT

## 2022-09-12 PROCEDURE — 6370000000 HC RX 637 (ALT 250 FOR IP): Performed by: HOSPITALIST

## 2022-09-12 PROCEDURE — 99232 SBSQ HOSP IP/OBS MODERATE 35: CPT | Performed by: INTERNAL MEDICINE

## 2022-09-12 PROCEDURE — 71045 X-RAY EXAM CHEST 1 VIEW: CPT

## 2022-09-12 PROCEDURE — 99233 SBSQ HOSP IP/OBS HIGH 50: CPT | Performed by: INTERNAL MEDICINE

## 2022-09-12 RX ADMIN — MORPHINE SULFATE 2 MG: 2 INJECTION, SOLUTION INTRAMUSCULAR; INTRAVENOUS at 03:21

## 2022-09-12 RX ADMIN — SODIUM CHLORIDE, PRESERVATIVE FREE 10 ML: 5 INJECTION INTRAVENOUS at 08:10

## 2022-09-12 RX ADMIN — SODIUM CHLORIDE, PRESERVATIVE FREE 10 ML: 5 INJECTION INTRAVENOUS at 21:08

## 2022-09-12 RX ADMIN — PANTOPRAZOLE SODIUM 40 MG: 40 TABLET, DELAYED RELEASE ORAL at 08:10

## 2022-09-12 RX ADMIN — BENZONATATE 100 MG: 100 CAPSULE ORAL at 17:46

## 2022-09-12 RX ADMIN — ENOXAPARIN SODIUM 40 MG: 100 INJECTION SUBCUTANEOUS at 08:10

## 2022-09-12 RX ADMIN — NADOLOL 20 MG: 40 TABLET ORAL at 08:09

## 2022-09-12 RX ADMIN — MORPHINE SULFATE 4 MG: 4 INJECTION, SOLUTION INTRAMUSCULAR; INTRAVENOUS at 16:04

## 2022-09-12 RX ADMIN — GUAIFENESIN 1200 MG: 600 TABLET, EXTENDED RELEASE ORAL at 08:09

## 2022-09-12 RX ADMIN — Medication 2 PUFF: at 17:48

## 2022-09-12 RX ADMIN — PIRFENIDONE 3 TABLET: 267 TABLET, FILM COATED ORAL at 21:09

## 2022-09-12 RX ADMIN — Medication 2 PUFF: at 14:40

## 2022-09-12 RX ADMIN — Medication 2 PUFF: at 07:35

## 2022-09-12 RX ADMIN — BENZONATATE 100 MG: 100 CAPSULE ORAL at 10:40

## 2022-09-12 RX ADMIN — DEXAMETHASONE 6 MG: 4 TABLET ORAL at 09:39

## 2022-09-12 RX ADMIN — LEVOTHYROXINE SODIUM 125 MCG: 125 TABLET ORAL at 08:10

## 2022-09-12 RX ADMIN — SERTRALINE 200 MG: 100 TABLET, FILM COATED ORAL at 08:10

## 2022-09-12 RX ADMIN — PIRFENIDONE 3 TABLET: 267 TABLET, FILM COATED ORAL at 12:47

## 2022-09-12 RX ADMIN — PIRFENIDONE 3 TABLET: 267 TABLET, FILM COATED ORAL at 08:09

## 2022-09-12 RX ADMIN — ATORVASTATIN CALCIUM 40 MG: 40 TABLET, FILM COATED ORAL at 08:10

## 2022-09-12 RX ADMIN — MAGNESIUM GLUCONATE 500 MG ORAL TABLET 400 MG: 500 TABLET ORAL at 08:10

## 2022-09-12 RX ADMIN — MUPIROCIN: 20 OINTMENT TOPICAL at 09:39

## 2022-09-12 RX ADMIN — Medication 2 PUFF: at 11:22

## 2022-09-12 ASSESSMENT — PAIN DESCRIPTION - DESCRIPTORS
DESCRIPTORS: ACHING;SORE
DESCRIPTORS: STABBING

## 2022-09-12 ASSESSMENT — PAIN DESCRIPTION - ORIENTATION
ORIENTATION: RIGHT
ORIENTATION: RIGHT

## 2022-09-12 ASSESSMENT — PAIN SCALES - GENERAL: PAINLEVEL_OUTOF10: 7

## 2022-09-12 ASSESSMENT — PAIN DESCRIPTION - LOCATION
LOCATION: BACK
LOCATION: CHEST

## 2022-09-12 ASSESSMENT — PAIN - FUNCTIONAL ASSESSMENT: PAIN_FUNCTIONAL_ASSESSMENT: ACTIVITIES ARE NOT PREVENTED

## 2022-09-12 NOTE — PROGRESS NOTES
orders unless patient has one or more of the following: on home nebulizer, not able to hold breath for 10 seconds, is not alert and oriented, cannot activate and use MDI correctly, or respiratory rate 25 breaths per minute or more, then use the equivalent nebulizer order(s) with same Frequency and PRN reasons based on the score. If a patient is on this medication at home then do not decrease Frequency below that used at home. 0-3 - enter or revise RT bronchodilator order(s) to equivalent RT Bronchodilator order with Frequency of every 4 hours PRN for wheezing or increased work of breathing using Per Protocol order mode. 4-6 - enter or revise RT Bronchodilator order(s) to two equivalent RT bronchodilator orders with one order with BID Frequency and one order with Frequency of every 4 hours PRN wheezing or increased work of breathing using Per Protocol order mode. 7-10 - enter or revise RT Bronchodilator order(s) to two equivalent RT bronchodilator orders with one order with TID Frequency and one order with Frequency of every 4 hours PRN wheezing or increased work of breathing using Per Protocol order mode. 11-13 - enter or revise RT Bronchodilator order(s) to one equivalent RT bronchodilator order with QID Frequency and an Albuterol order with Frequency of every 4 hours PRN wheezing or increased work of breathing using Per Protocol order mode. Greater than 13 - enter or revise RT Bronchodilator order(s) to one equivalent RT bronchodilator order with every 4 hours Frequency and an Albuterol order with Frequency of every 2 hours PRN wheezing or increased work of breathing using Per Protocol order mode. RT to enter RT Home Evaluation for COPD & MDI Assessment order using Per Protocol order mode.     Electronically signed by Nick Sibley RCP on 9/12/2022 at 9:20 AM

## 2022-09-12 NOTE — FLOWSHEET NOTE
09/12/22 1500   Vital Signs   Temp 99.7 °F (37.6 °C)   Temp Source Oral   Heart Rate 69   Resp 22   /65   BP Location Left upper arm   MAP (Calculated) 84   Level of Consciousness 0   MEWS Score 2   Oxygen Therapy   SpO2 94 %   O2 Device High flow nasal cannula   O2 Flow Rate (L/min) 6 L/min     Patient arrived to PCU in stable condition. No s/s of distress noted. Chest tube to suction. VSS. Will monitor.

## 2022-09-12 NOTE — ED PROVIDER NOTES
I independently examined and evaluated Armin Obando. I personally saw the patient and performed a substantive portion of the visit including all aspects of the medical decision making. I am the primary physician of record. In brief, Milo Coleman is a 69 y.o. female with a past medical history of interstitial lung disease, CHF, hypertension, hyperlipidemia, atrial fibrillation, COPD, diabetes who presents to the ED complaining of shortness of breath. Patient reports to symptom past 2 days just got out of rehab after back injury. She typically uses 3 L nasal cannula at all times, however over the past few days she has been utilizing 6 L. On EMS arrival even on 6 L, patient's oxygen saturation was 80%. She was placed on nonrebreather. Patient does report significant productive coughing. She does report discomfort in her chest when she coughs, otherwise no pain. She denies leg swelling, previous pulmonary embolism, hemoptysis, OCP use. REVIEW OF SYSTEMS  All systems reviewed, pertinent positives per HPI otherwise noted to be negative. Focused exam revealed   PHYSICAL EXAM  BP (!) 135/88  Pulse 112  Temp 98.6 °F (37 °C) (Oral)   Resp 26  Ht 5' 3\" (1.6 m)   Wt 126 lb 12.2 oz (57.5 kg)   SpO2 100%   BMI 22.46 kg/m²    GENERAL APPEARANCE: Awake and alert. Cooperative. HENT: Normocephalic. Atraumatic. Mucous membranes are moist  NECK: Supple. Full range of motion of the neck without stiffness or pain. EYES: PERRL. EOM's grossly intact. HEART/CHEST: RRR. No murmurs. Chest wall is not tender to palpation. LUNGS: Respirations labored. Was on nasal cannula oxygen initially but was hypoxic so changed back to nonrebreather  ABDOMEN: No tenderness. Soft. Non-distended. No masses. No organomegaly. No guarding or rebound. MUSCULOSKELETAL: No extremity edema. Compartments soft. No deformity. No tenderness in the extremities. All extremities neurovascularly intact.   SKIN: Warm and dry. No acute rashes. NEUROLOGICAL: Alert and oriented. No gross facial drooping. Strength 5/5, sensation intact. PSYCHIATRIC: Normal mood and affect. ED course / MDM:   Overall ill appearing patient, in acute distress, presenting for shortness of breath. Physical exam remarkable for tachycardia and hypoxia. I personally saw the patient and performed a substantive portion of the visit including all aspects of the medical decision making. Differential diagnosis includes but is not limited to: Pneumonia, pneumothorax, pleural effusion, ACS, CHF exacerbation, COPD exacerbation, asthma, pulmonary embolism, arrhythmia, anemia    Labs, EKG, and imaging obtained. Workup showed:    Imaging:  XR CHEST PORTABLE   Final Result   Interval placement of a locking loop chest tube at the right lung apex with   resolved pneumothorax. CT CHEST PULMONARY EMBOLISM W CONTRAST   Final Result   No evidence of pulmonary embolism      Right apical, anterior and lateral pneumothorax approximately 30%. Chest tube   extends along the right lateral chest wall however tip is extrapleural and   lateral to the upper ribcage      Coronary artery/atherosclerotic disease. Cardiomegaly      Extensive bullous changes and pulmonary fibrosis      Small right pleural effusion      RECOMMENDATIONS:   Re-evaluate for reinsertion of right chest tube         XR CHEST PORTABLE   Final Result   Nearly completely resolved right pneumothorax. Chest tube along the right   lateral hemithorax however tip appears extrapleural.         XR CHEST PORTABLE   Final Result   1. Moderate right-sided pneumothorax with approximately 4 cm of pleural   separation at the lung apex. No definite evidence of tension morphology at   this time. 2.  Chronic interstitial lung disease           Imaging remarkable for pneumothorax. Patient is on a nonrebreather. Chest tube placed.      ECG:  The Ekg interpreted by me shows  sinus tachycardia, [id number]

## 2022-09-12 NOTE — PROGRESS NOTES
Report given to [facility].  Care transferred at time of arrival to PCU

## 2022-09-12 NOTE — PROGRESS NOTES
IM Progress Note    Admit Date:  9/9/2022  2    Interval history:  ILD with right pneumothorax, covid positive  S.p chest tube placement , back to home o2 of 4 L     Subjective:  Ms. Nancy Pedraza reports being tired but no worsening sob  Remains on home level of 3 L     Objective:   BP (!) 155/64   Pulse 65   Temp 98.6 °F (37 °C) (Oral)   Resp 18   Ht 5' 3\" (1.6 m)   Wt 126 lb 12.2 oz (57.5 kg)   SpO2 100%   BMI 22.46 kg/m²   No intake or output data in the 24 hours ending 09/12/22 0736    :Physical Exam:  General:  thin elderly female chronically ill appearing  Awake, alert and oriented. Appears to be not in any distress  Mucous Membranes:  Pink , anicteric  Neck: No JVD, no carotid bruit, no thyromegaly  Chest:  diminished in bases with scattered crackles , right sided lateral chest tube   With no subcut emphysema   Cardiovascular:  RRR S1S2 heard, no murmurs or gallops  Abdomen:  Soft, undistended, non tender, no organomegaly, BS present  Extremities: No edema or cyanosis.  Distal pulses well felt  Neurological : no focal deficits    Medications:   Scheduled Medications:    albuterol sulfate HFA  2 puff Inhalation 4x daily    acetaminophen  1,000 mg Oral Once    atorvastatin  40 mg Oral Daily    guaiFENesin  1,200 mg Oral QAM    levothyroxine  125 mcg Oral Daily    magnesium oxide  400 mg Oral Daily    nadolol  20 mg Oral Daily    pantoprazole  40 mg Oral QAM AC    insulin lispro  0-4 Units SubCUTAneous TID WC    insulin lispro  0-4 Units SubCUTAneous Nightly    sodium chloride flush  5-40 mL IntraVENous 2 times per day    enoxaparin  40 mg SubCUTAneous Daily    sertraline  200 mg Oral Daily    Pirfenidone  3 tablet Oral TID    dexamethasone  6 mg IntraVENous Q12H     I   dextrose      sodium chloride       albuterol sulfate HFA, benzonatate, hydrOXYzine HCl, glucose, dextrose bolus **OR** dextrose bolus, glucagon (rDNA), dextrose, sodium chloride flush, sodium chloride, ondansetron **OR** ondansetron, polyethylene glycol, acetaminophen **OR** acetaminophen, morphine, morphine, mirtazapine, methocarbamol    Lab Data:  Recent Labs     09/09/22 1954 09/11/22 0419   WBC 8.2 5.0   HGB 8.9* 8.4*   HCT 27.1* 26.4*   MCV 83.6 83.1    248       Recent Labs     09/09/22 1954 09/11/22 0419    136   K 3.1* 3.8   CL 98* 98*   CO2 32 33*   BUN 8 5*   CREATININE <0.5* <0.5*       Recent Labs     09/09/22 1954   TROPONINI <0.01         Coagulation:   Lab Results   Component Value Date/Time    INR 1.17 07/10/2019 10:00 AM    APTT 25.7 06/16/2014 12:40 PM     Cardiac markers:   Lab Results   Component Value Date/Time    CKTOTAL 23 07/10/2019 04:11 PM    TROPONINI <0.01 09/09/2022 07:54 PM         Lab Results   Component Value Date    ALT 9 (L) 09/09/2022    AST 17 09/09/2022    ALKPHOS 138 (H) 09/09/2022    BILITOT 0.3 09/09/2022       Lab Results   Component Value Date    INR 1.17 (H) 07/10/2019    INR 1.38 (H) 06/27/2019    INR 1.42 (H) 06/26/2019    PROTIME 13.3 (H) 07/10/2019    PROTIME 15.7 (H) 06/27/2019    PROTIME 16.0 (H) 06/26/2019       Radiology       EKG:sinus tachycardia      Radiology:     Cxr    Partial retraction of right chest tube. Distal side port may be extra   thoracic. There is perhaps minimal subcutaneous air. Small right apical pneumothorax, decreased in the interval.     Stable extensive interstitial pattern. Chest xray  Interval placement of a locking loop chest tube at the right lung apex with   resolved pneumothorax         CTA chest  No evidence of pulmonary embolism     Right apical, anterior and lateral pneumothorax approximately 30%. Chest tube   extends along the right lateral chest wall however tip is extrapleural and   lateral to the upper ribcage     Coronary artery/atherosclerotic disease. Cardiomegaly     Extensive bullous changes and pulmonary fibrosis     Small right pleural effusion      Cxr     Nearly completely resolved right pneumothorax.  Chest tube along the right   lateral hemithorax however tip appears extrapleural.     Cxr      Moderate right-sided pneumothorax with approximately 4 cm of pleural   separation at the lung apex. No definite evidence of tension morphology at   this time. 2.  Chronic interstitial lung disease      Pertinent previous results reviewed      Echocardiogram from 5/6/22      Summary   Technically difficult examination. Apical views are off axis secondary to pt   left ribs fracture. Left ventricular systolic function is normal with ejection fraction   estimated at 55%. No regional wall motion abnormalities. There is mild concentric left ventricular hypertrophy. Indeterminate left ventricular filling pressure. Systolic pulmonary artery pressure (SPAP) is normal estimated at 22 mmHg   (Right atrial pressure of 8 mmHg). ASSESMENT & PLAN:      Acute on chronic respiratory failure  Hx of ILD on 3 L at home  Covid 19 infection   Right pneumothorax     -pt with hx of Pulmonary fibrosis  and Hx of cryptogenic organizing pneumonia with chronic resp failure with recurrent admissions  - imaging with right sided pneumothorax likely induced by cough s.p chest tube placement and resolved on repeat imaging.  CT to water seal today   - doubt hypoxia due to covid , as it improved since chest tube is placed and now back on home level  Based on the chest x-ray today the chest tube will have to be repositioned   - can wean steroids- resumed Pirfenidone   -Pulmonology consulted  Continue Decadron 6 mg daily     Chronic diastolic CHF  last echo as above, from 5/6/22 EF 55%  -BNP mildly elevated  - pt was taken of diuretics recently for weight loss   - monitor for fluid overload and resume lasix as needed only      S.p recent L1 burst fracture -  s/p T11-L3 PDFF with L1 corpectomy at  ( 8/22)     #DM type 2  -hHas been taken off diuretics and diabetic meds for progressive weight loss   - hyperglycemia with steroids  -SSI  -continue to monitor BG

## 2022-09-12 NOTE — PROGRESS NOTES
4 Eyes Skin Assessment     The patient is being assess for   Transfer to New Unit    I agree that 2 RN's have performed a thorough Head to Toe Skin Assessment on the patient. ALL assessment sites listed below have been assessed. Areas assessed for pressure by both nurses:   [x]   Head, Face, and Ears   [x]   Shoulders, Back, and Chest, Abdomen  [x]   Arms, Elbows, and Hands   [x]   Coccyx, Sacrum, and Ischium  [x]   Legs, Feet, and Heels    Healing pressure ulcer to R buttock, blanchable redness to heels, surgical scar to back. Skin Assessed Under all Medical Devices by both nurses:  O2 device tubing              All Mepilex Borders were peeled back and area peeked at by both nurses:  No: NA  Please list where Mepilex Borders are located:  NA             **SHARE this note so that the co-signing nurse is able to place an eSignature**    Co-signer eSignature: Electronically signed by Grant Blandon RN on 9/12/22 at 3:58 PM EDT    Does the Patient have Skin Breakdown related to pressure?   No     (Insert Photo here)         Amos Prevention initiated:  No   Wound Care Orders initiated:  NA      [facility] nurse consulted for Pressure Injury (Stage 3,4, Unstageable, DTI, NWPT, Complex wounds)and New or Established Ostomies:  NA      Primary Nurse eSignature: Electronically signed by Na Marcial RN on 9/12/22 at 3:57 PM EDT

## 2022-09-12 NOTE — PROGRESS NOTES
09/11/22 2000   RT Protocol   History Pulmonary Disease 2   Respiratory pattern 2   Breath sounds 2   Cough 2   Indications for Bronchodilator Therapy On home bronchodilators   Bronchodilator Assessment Score 8   RT Inhaler-Nebulizer Bronchodilator Protocol Note    There is a bronchodilator order in the chart from a provider indicating to follow the RT Bronchodilator Protocol and there is an Initiate RT Inhaler-Nebulizer Bronchodilator Protocol order as well (see protocol at bottom of note). CXR Findings:  XR CHEST PORTABLE    Result Date: 9/11/2022  Partial retraction of right chest tube. Distal side port may be extra thoracic. There is perhaps minimal subcutaneous air. Small right apical pneumothorax, decreased in the interval. Stable extensive interstitial pattern. XR CHEST PORTABLE    Result Date: 9/10/2022  Increased size of right-sided pneumothorax measuring 1.6 cm with stable positioning of the right apically oriented pigtail thoracostomy tube. The findings were sent to the [facility] Results Communication Team at 15:57 on 9/10/2022 to be communicated to a licensed caregiver. XR CHEST PORTABLE    Result Date: 9/10/2022  Right chest tube is in normal position. No pneumothorax is evident. Interstitial opacities, correlating with extensive emphysematous disease. XR CHEST PORTABLE    Result Date: 9/10/2022  Interval placement of a locking loop chest tube at the right lung apex with resolved pneumothorax. The findings from the last RT Protocol Assessment were as follows:   History Pulmonary Disease: Chronic pulmonary disease  Respiratory Pattern: Dyspnea on exertion or RR 21-25 bpm  Breath Sounds: Slightly diminished and/or crackles  Cough: Weak, productive  Indication for Bronchodilator Therapy:  On home bronchodilators  Bronchodilator Assessment Score: 8    Aerosolized bronchodilator medication orders have been revised according to the RT Inhaler-Nebulizer Bronchodilator Protocol below.    Respiratory Therapist to perform RT Therapy Protocol Assessment initially then follow the protocol. Repeat RT Therapy Protocol Assessment PRN for score 0-3 or on second treatment, BID, and PRN for scores above 3. No Indications - adjust the frequency to every 6 hours PRN wheezing or bronchospasm, if no treatments needed after 48 hours then discontinue using Per Protocol order mode. If indication present, adjust the RT bronchodilator orders based on the Bronchodilator Assessment Score as indicated below. Use Inhaler orders unless patient has one or more of the following: on home nebulizer, not able to hold breath for 10 seconds, is not alert and oriented, cannot activate and use MDI correctly, or respiratory rate 25 breaths per minute or more, then use the equivalent nebulizer order(s) with same Frequency and PRN reasons based on the score. If a patient is on this medication at home then do not decrease Frequency below that used at home. 0-3 - enter or revise RT bronchodilator order(s) to equivalent RT Bronchodilator order with Frequency of every 4 hours PRN for wheezing or increased work of breathing using Per Protocol order mode. 4-6 - enter or revise RT Bronchodilator order(s) to two equivalent RT bronchodilator orders with one order with BID Frequency and one order with Frequency of every 4 hours PRN wheezing or increased work of breathing using Per Protocol order mode. 7-10 - enter or revise RT Bronchodilator order(s) to two equivalent RT bronchodilator orders with one order with TID Frequency and one order with Frequency of every 4 hours PRN wheezing or increased work of breathing using Per Protocol order mode. 11-13 - enter or revise RT Bronchodilator order(s) to one equivalent RT bronchodilator order with QID Frequency and an Albuterol order with Frequency of every 4 hours PRN wheezing or increased work of breathing using Per Protocol order mode.       Greater than 13 - enter or revise RT Bronchodilator order(s) to one equivalent RT bronchodilator order with every 4 hours Frequency and an Albuterol order with Frequency of every 2 hours PRN wheezing or increased work of breathing using Per Protocol order mode. RT to enter RT Home Evaluation for COPD & MDI Assessment order using Per Protocol order mode.     Electronically signed by Rachel Oliveira RCP on 9/11/2022 at 8:26 PM

## 2022-09-12 NOTE — PROGRESS NOTES
AM assessment completed. See flowsheet. A/O x 4. States she feels better this AM. Lungs sounds diminished throughout. NSR on bedside monitor. Bowel sounds active. No edema noted. Urinary continence. Labs reviewed. Cont to monitor.

## 2022-09-12 NOTE — PROGRESS NOTES
09/11/22 2000   RT Protocol   History Pulmonary Disease 2   Respiratory pattern 2   Breath sounds 2   Cough 2   Indications for Bronchodilator Therapy On home bronchodilators   Bronchodilator Assessment Score 8   RT Inhaler-Nebulizer Bronchodilator Protocol Note    There is a bronchodilator order in the chart from a provider indicating to follow the RT Bronchodilator Protocol and there is an Initiate RT Inhaler-Nebulizer Bronchodilator Protocol order as well (see protocol at bottom of note). CXR Findings:  XR CHEST PORTABLE    Result Date: 9/11/2022  Partial retraction of right chest tube. Distal side port may be extra thoracic. There is perhaps minimal subcutaneous air. Small right apical pneumothorax, decreased in the interval. Stable extensive interstitial pattern. XR CHEST PORTABLE    Result Date: 9/10/2022  Increased size of right-sided pneumothorax measuring 1.6 cm with stable positioning of the right apically oriented pigtail thoracostomy tube. The findings were sent to the [facility] Results Communication Team at 15:57 on 9/10/2022 to be communicated to a licensed caregiver. XR CHEST PORTABLE    Result Date: 9/10/2022  Right chest tube is in normal position. No pneumothorax is evident. Interstitial opacities, correlating with extensive emphysematous disease. XR CHEST PORTABLE    Result Date: 9/10/2022  Interval placement of a locking loop chest tube at the right lung apex with resolved pneumothorax. The findings from the last RT Protocol Assessment were as follows:   History Pulmonary Disease: Chronic pulmonary disease  Respiratory Pattern: Dyspnea on exertion or RR 21-25 bpm  Breath Sounds: Slightly diminished and/or crackles  Cough: Weak, productive  Indication for Bronchodilator Therapy:  On home bronchodilators  Bronchodilator Assessment Score: 8    Aerosolized bronchodilator medication orders have been revised according to the RT Inhaler-Nebulizer Bronchodilator Protocol - enter or revise RT Bronchodilator order(s) to one equivalent RT bronchodilator order with every 4 hours Frequency and an Albuterol order with Frequency of every 2 hours PRN wheezing or increased work of breathing using Per Protocol order mode.        Electronically signed by Zachery Perez RCP on 9/11/2022 at 8:26 PM

## 2022-09-12 NOTE — PROGRESS NOTES
Pulmonary & Critical Care Medicine ICU Progress Note    CC: COVID, pneumothorax    Events of Last 24 hours: no new issues     Vascular lines: IV: peripheral    MV:  none     / / /FiO2 : 100 %  No results for input(s): PHART, CDT7BEH, PO2ART in the last 72 hours. IV:   dextrose      sodium chloride         Vitals:  Blood pressure (!) 155/64, pulse 65, temperature 98.6 °F (37 °C), temperature source Oral, resp. rate 18, height 5' 3\" (1.6 m), weight 126 lb 12.2 oz (57.5 kg), SpO2 100 %, not currently breastfeeding. on 3 L   Temp  Av.6 °F (37 °C)  Min: 98.6 °F (37 °C)  Max: 98.6 °F (37 °C)    Intake/Output Summary (Last 24 hours) at 2022 0630  Last data filed at 2022 0657  Gross per 24 hour   Intake --   Output 605 ml   Net -605 ml     PE:  General: ill appearing    Eyes: PERRL. No sclera icterus. No conjunctival injection. ENT: No discharge. Pharynx clear. Neck: Trachea midline. Normal thyroid. Resp: No accessory muscle use. + crackles. No wheezing. No rhonchi. No dullness on percussion. Chest tube on R with air leak. CV: Regular rate. Regular rhythm. No mumur or rub. No edema. Peripheral pulses are 2+. Capillary refill is less than 3 seconds. GI: Non-tender. Non-distended. No masses. No organomegaly. Normal bowel sounds. No hernia. Skin: Warm and dry. No nodule on exposed extremities. No rash on exposed extremities. Lymph: No cervical LAD. No supraclavicular LAD. M/S: No cyanosis. No joint deformity. No clubbing. Neuro: A&OX3.  Patellar reflexes are symmetric  Psych: No agitation, no anxiety, affect is full     Scheduled Meds:   albuterol sulfate HFA  2 puff Inhalation 4x daily    acetaminophen  1,000 mg Oral Once    atorvastatin  40 mg Oral Daily    guaiFENesin  1,200 mg Oral QAM    levothyroxine  125 mcg Oral Daily    magnesium oxide  400 mg Oral Daily    nadolol  20 mg Oral Daily    pantoprazole  40 mg Oral QAM AC    insulin lispro  0-4 Units SubCUTAneous TID WC    insulin lispro 0-4 Units SubCUTAneous Nightly    sodium chloride flush  5-40 mL IntraVENous 2 times per day    enoxaparin  40 mg SubCUTAneous Daily    sertraline  200 mg Oral Daily    Pirfenidone  3 tablet Oral TID    dexamethasone  6 mg IntraVENous Q12H       Data:  CBC:   Recent Labs     09/09/22 1954 09/11/22 0419   WBC 8.2 5.0   HGB 8.9* 8.4*   HCT 27.1* 26.4*   MCV 83.6 83.1    248     BMP:   Recent Labs     09/09/22 1954 09/11/22  0419    136   K 3.1* 3.8   CL 98* 98*   CO2 32 33*   BUN 8 5*   CREATININE <0.5* <0.5*     LIVER PROFILE:   Recent Labs     09/09/22 1954   AST 17   ALT 9*   BILITOT 0.3   ALKPHOS 138*       Microbiology:  9/9/2022 SARS-CoV-2 and influenza are negative  9/9/2022 blood NGTD    Imaging:  CTPA 9/9/2022 bullous emphysema, pulmonary fibrosis, right-sided pneumothorax    CXR 9/11/2022 small right apical pneumothorax, possible retraction of chest tube    ASSESSMENT:  Acute on chronic hypoxemic respiratory failure, on 3 L home oxygen  COVID-19 pneumonia  Secondary spontaneous pneumothorax  ILD, h/o  & NSIP v chronic HP now with acute exacerbation   Severe bullous changes/paraseptal emphysema - no tobacco history   Chronic diastolic CHF  K74-P5 PDFF withL1 corpectomy @  8/22/22 for L1 burst fracture  DM      PLAN:  Supplemental oxygen to maintain SaO2 >92%; wean as tolerated    Decadron 6 mg daily   Home pirfenidone, monitoring for toxicity - check LFT in am   Inhaled bronchodilators   Chest tube to suction, daily CXR  Chest tube may need to be repositioned   SSI  PRN lasix   PCU boarder

## 2022-09-13 ENCOUNTER — APPOINTMENT (OUTPATIENT)
Dept: GENERAL RADIOLOGY | Age: 67
DRG: 177 | End: 2022-09-13
Payer: MEDICARE

## 2022-09-13 LAB
A/G RATIO: 1 (ref 1.1–2.2)
ALBUMIN SERPL-MCNC: 2.8 G/DL (ref 3.4–5)
ALP BLD-CCNC: 131 U/L (ref 40–129)
ALT SERPL-CCNC: 12 U/L (ref 10–40)
ANION GAP SERPL CALCULATED.3IONS-SCNC: 4 MMOL/L (ref 3–16)
AST SERPL-CCNC: 29 U/L (ref 15–37)
BILIRUB SERPL-MCNC: 0.3 MG/DL (ref 0–1)
BUN BLDV-MCNC: 12 MG/DL (ref 7–20)
CALCIUM SERPL-MCNC: 8.5 MG/DL (ref 8.3–10.6)
CHLORIDE BLD-SCNC: 98 MMOL/L (ref 99–110)
CO2: 35 MMOL/L (ref 21–32)
CREAT SERPL-MCNC: <0.5 MG/DL (ref 0.6–1.2)
GFR AFRICAN AMERICAN: >60
GFR NON-AFRICAN AMERICAN: >60
GLUCOSE BLD-MCNC: 106 MG/DL (ref 70–99)
GLUCOSE BLD-MCNC: 139 MG/DL (ref 70–99)
GLUCOSE BLD-MCNC: 150 MG/DL (ref 70–99)
GLUCOSE BLD-MCNC: 90 MG/DL (ref 70–99)
GLUCOSE BLD-MCNC: 93 MG/DL (ref 70–99)
PERFORMED ON: ABNORMAL
PERFORMED ON: NORMAL
POTASSIUM SERPL-SCNC: 4 MMOL/L (ref 3.5–5.1)
SODIUM BLD-SCNC: 137 MMOL/L (ref 136–145)
TOTAL PROTEIN: 5.7 G/DL (ref 6.4–8.2)

## 2022-09-13 PROCEDURE — 6360000002 HC RX W HCPCS: Performed by: INTERNAL MEDICINE

## 2022-09-13 PROCEDURE — 6370000000 HC RX 637 (ALT 250 FOR IP): Performed by: INTERNAL MEDICINE

## 2022-09-13 PROCEDURE — 71045 X-RAY EXAM CHEST 1 VIEW: CPT

## 2022-09-13 PROCEDURE — 36415 COLL VENOUS BLD VENIPUNCTURE: CPT

## 2022-09-13 PROCEDURE — 2580000003 HC RX 258: Performed by: INTERNAL MEDICINE

## 2022-09-13 PROCEDURE — 99233 SBSQ HOSP IP/OBS HIGH 50: CPT

## 2022-09-13 PROCEDURE — 94640 AIRWAY INHALATION TREATMENT: CPT

## 2022-09-13 PROCEDURE — 2700000000 HC OXYGEN THERAPY PER DAY

## 2022-09-13 PROCEDURE — 80053 COMPREHEN METABOLIC PANEL: CPT

## 2022-09-13 PROCEDURE — 94761 N-INVAS EAR/PLS OXIMETRY MLT: CPT

## 2022-09-13 PROCEDURE — 2060000000 HC ICU INTERMEDIATE R&B

## 2022-09-13 PROCEDURE — 99232 SBSQ HOSP IP/OBS MODERATE 35: CPT | Performed by: INTERNAL MEDICINE

## 2022-09-13 RX ADMIN — MUPIROCIN: 20 OINTMENT TOPICAL at 15:25

## 2022-09-13 RX ADMIN — NADOLOL 20 MG: 40 TABLET ORAL at 09:17

## 2022-09-13 RX ADMIN — SODIUM CHLORIDE, PRESERVATIVE FREE 10 ML: 5 INJECTION INTRAVENOUS at 20:30

## 2022-09-13 RX ADMIN — MORPHINE SULFATE 2 MG: 2 INJECTION, SOLUTION INTRAMUSCULAR; INTRAVENOUS at 17:11

## 2022-09-13 RX ADMIN — LEVOTHYROXINE SODIUM 125 MCG: 125 TABLET ORAL at 06:14

## 2022-09-13 RX ADMIN — Medication 2 PUFF: at 20:02

## 2022-09-13 RX ADMIN — Medication 2 PUFF: at 07:20

## 2022-09-13 RX ADMIN — BENZONATATE 100 MG: 100 CAPSULE ORAL at 01:07

## 2022-09-13 RX ADMIN — Medication 2 PUFF: at 15:51

## 2022-09-13 RX ADMIN — BENZONATATE 100 MG: 100 CAPSULE ORAL at 06:14

## 2022-09-13 RX ADMIN — MORPHINE SULFATE 4 MG: 4 INJECTION, SOLUTION INTRAMUSCULAR; INTRAVENOUS at 00:26

## 2022-09-13 RX ADMIN — Medication 2 PUFF: at 12:14

## 2022-09-13 RX ADMIN — SERTRALINE 200 MG: 100 TABLET, FILM COATED ORAL at 09:17

## 2022-09-13 RX ADMIN — ONDANSETRON HYDROCHLORIDE 4 MG: 2 INJECTION, SOLUTION INTRAMUSCULAR; INTRAVENOUS at 10:39

## 2022-09-13 RX ADMIN — MORPHINE SULFATE 4 MG: 4 INJECTION, SOLUTION INTRAMUSCULAR; INTRAVENOUS at 06:14

## 2022-09-13 RX ADMIN — ATORVASTATIN CALCIUM 40 MG: 40 TABLET, FILM COATED ORAL at 09:18

## 2022-09-13 RX ADMIN — SODIUM CHLORIDE, PRESERVATIVE FREE 10 ML: 5 INJECTION INTRAVENOUS at 09:23

## 2022-09-13 RX ADMIN — MUPIROCIN: 20 OINTMENT TOPICAL at 20:30

## 2022-09-13 RX ADMIN — GUAIFENESIN 1200 MG: 600 TABLET, EXTENDED RELEASE ORAL at 09:18

## 2022-09-13 RX ADMIN — BENZONATATE 100 MG: 100 CAPSULE ORAL at 22:35

## 2022-09-13 RX ADMIN — DEXAMETHASONE 6 MG: 4 TABLET ORAL at 09:18

## 2022-09-13 RX ADMIN — PIRFENIDONE 3 TABLET: 267 TABLET, FILM COATED ORAL at 20:30

## 2022-09-13 RX ADMIN — PIRFENIDONE 3 TABLET: 267 TABLET, FILM COATED ORAL at 12:57

## 2022-09-13 RX ADMIN — MAGNESIUM GLUCONATE 500 MG ORAL TABLET 400 MG: 500 TABLET ORAL at 09:17

## 2022-09-13 RX ADMIN — PANTOPRAZOLE SODIUM 40 MG: 40 TABLET, DELAYED RELEASE ORAL at 06:14

## 2022-09-13 ASSESSMENT — PAIN DESCRIPTION - LOCATION
LOCATION: BACK

## 2022-09-13 ASSESSMENT — PAIN DESCRIPTION - DESCRIPTORS
DESCRIPTORS: STABBING
DESCRIPTORS: STABBING
DESCRIPTORS: DISCOMFORT
DESCRIPTORS: STABBING

## 2022-09-13 ASSESSMENT — PAIN DESCRIPTION - ORIENTATION
ORIENTATION: RIGHT

## 2022-09-13 ASSESSMENT — PAIN - FUNCTIONAL ASSESSMENT: PAIN_FUNCTIONAL_ASSESSMENT: ACTIVITIES ARE NOT PREVENTED

## 2022-09-13 ASSESSMENT — PAIN SCALES - GENERAL
PAINLEVEL_OUTOF10: 7
PAINLEVEL_OUTOF10: 4
PAINLEVEL_OUTOF10: 5
PAINLEVEL_OUTOF10: 4
PAINLEVEL_OUTOF10: 6
PAINLEVEL_OUTOF10: 7

## 2022-09-13 ASSESSMENT — PAIN DESCRIPTION - FREQUENCY: FREQUENCY: CONTINUOUS

## 2022-09-13 ASSESSMENT — PAIN DESCRIPTION - ONSET: ONSET: ON-GOING

## 2022-09-13 ASSESSMENT — PAIN DESCRIPTION - PAIN TYPE: TYPE: SURGICAL PAIN

## 2022-09-13 ASSESSMENT — PAIN DESCRIPTION - DIRECTION: RADIATING_TOWARDS: NON

## 2022-09-13 NOTE — PROGRESS NOTES
Vitals are stable. Patient is awake and in bed eating a snack. Patient denies further needs at this time. Call light within reach.

## 2022-09-13 NOTE — CARE COORDINATION
Case Management Assessment  Initial Evaluation      Patient Name: Pa Obando  YOB: 1955  Diagnosis: Pneumonia due to COVID-19 virus [U07.1, J12.82]  Date / Time: 9/9/2022  5:21 PM    Admission status/Date: INPT   Chart Reviewed: Yes      Patient Interviewed: No   Family Interviewed:  Yes - spouse, Noni Bell      Hospitalization in the last 30 days:  No      Health Care Decision Maker :   Primary Decision Maker: Curtis Cabello - Spouse - [phone]    Secondary Decision Maker: Tono Castaneda - Child - [phone]    (CM - must 1st enter selection under Navigator - emergency contact- [place] Relationship and pick relationship)   Who do you trust or have selected to make healthcare decisions for you      Met with: pt spouse via telephone  Interview conducted  (bedside/phone):    Current PCP: Jamie Cosme required for SNF : Y, N          3 night stay required - Y, [facility] [employer] [facility]  Support Systems/Care Needs: Spouse/Significant Other, Children  Transportation: family    Meal Preparation: family    Housing  Living Arrangements: currently home with dtr while recovering post-op.   works full time  Steps: 2  Intent for return to present living arrangements: Yes  Identified Issues: +C-19, recent back surgery    Home Care Information  Active with [address] : Yes - [facility]:(Services)  Type of Home Care Services: PT, OT, Nursing Services  Passport/Waiver : No  :                      Phone Number:    Passport/Waiver Services: n/a          Durable Medical Equiptment   DME Provider: [facility]  Equipment: O2, hhn  Walker___Cane___RTS___ BSC___Shower Chair_X__Hospital Bed___W/C__X__Other__Rollator______  02 at __3__Liter(s)---wears(frequency)__cont_____ HHN __X_ CPAP___ BiPap___   N/A____      Home O2 Use :  Yes    If No for home O2---if presently on O2 during hospitalization:  Yes  if yes CM to follow for potential DC O2 need  Informed of need for care provider to bring portable home O2 tank on day of discharge for nursing to connect prior to leaving:   Yes  Verbalized agreement/Understanding:   Yes    Community Service Affiliation  Dialysis:  No    Agency:  Location:  Dialysis Schedule:  Phone:   Fax: Other Community Services: (ex:PT/OT,Mental Health,Wound Clinic, Cardio/Pul [address])    DISCHARGE PLAN: Explained Case Management role/services. CM reviewed chart and spoke with pt spouse/POA, Susan Andrade, via telephone. Spouse reports that due to pt recovering post-op from back surgery, pt has been staying with her dtr as spouse works full time. Pt is active with [facility] for home O2 @ 3lpm and has a hhn. Pt is also active with [facility] for SN, PT, OT. Mr. Naveen Tyler states that the discharge plan is for pt to return home to her daughters home while she continues to recover. PT/OT evaluations not completed at this time. Pt currently with chest tube in place and is in Covid precautions. CM will cont to follow and assist pt and family with discharge needs.

## 2022-09-13 NOTE — PROGRESS NOTES
Progress Note    Admit Date:  9/9/2022    Interval history:  ILD with right pneumothorax, covid positive  S.p chest tube placement , back to home o2 of 4 L     Subjective:  Ms. Obando  stable . No CP or SOB. Looks weak, fatigued   On o2 4-5 L . R chest tube in place . Objective:   Patient Vitals for the past 4 hrs:   BP Temp Temp src Pulse Resp SpO2   09/13/22 1552 -- -- -- -- -- 99 %   09/13/22 1515 129/67 98.2 °F (36.8 °C) Oral 72 18 98 %   09/13/22 1215 -- -- -- -- -- 100 %          Intake/Output Summary (Last 24 hours) at 9/13/2022 1601  Last data filed at 9/13/2022 0946  Gross per 24 hour   Intake --   Output 0 ml   Net 0 ml       Physical Exam:    Gen: No distress. Alert. Awake, oriented . Thin , elderly female . Looks ill , fatigued   Eyes: PERRL. No sclera icterus. No conjunctival injection. ENT: No discharge. Pharynx clear. Neck: No JVD. Trachea midline. Resp: No accessory muscle use. Diminished breath sounds . No crackles. No wheezes. No rhonchi. R chest tube to suction   CV: Regular rate. Regular rhythm. No murmur. No rub. No edema. Capillary Refill: Brisk,< 3 seconds   Peripheral Pulses: +2 palpable, equal bilaterally   GI: Non-tender. Non-distended. Normal bowel sounds. Skin: Warm and dry. No nodule on exposed extremities. No rash on exposed extremities. M/S: No cyanosis. No joint deformity. No clubbing. Neuro: Awake. Grossly nonfocal    Psych: Oriented x 3. No anxiety or agitation.          Medications:  mupirocin, , BID  dexamethasone, 6 mg, Daily  albuterol sulfate HFA, 2 puff, 4x daily  acetaminophen, 1,000 mg, Once  atorvastatin, 40 mg, Daily  guaiFENesin, 1,200 mg, QAM  levothyroxine, 125 mcg, Daily  magnesium oxide, 400 mg, Daily  nadolol, 20 mg, Daily  pantoprazole, 40 mg, QAM AC  insulin lispro, 0-4 Units, TID WC  insulin lispro, 0-4 Units, Nightly  sodium chloride flush, 5-40 mL, 2 times per day  [Held by provider] enoxaparin, 40 mg, Daily  sertraline, 200 mg, Daily  Pirfenidone, 3 tablet, TID    PRN Medications:  albuterol sulfate HFA, 2 puff, Q6H PRN  benzonatate, 100 mg, Q4H PRN  hydrOXYzine HCl, 10 mg, Q8H PRN  glucose, 4 tablet, PRN  dextrose bolus, 125 mL, PRN   Or  dextrose bolus, 250 mL, PRN  glucagon (rDNA), 1 mg, PRN  dextrose, , Continuous PRN  sodium chloride flush, 5-40 mL, PRN  sodium chloride, , PRN  ondansetron, 4 mg, Q8H PRN   Or  ondansetron, 4 mg, Q6H PRN  polyethylene glycol, 17 g, Daily PRN  acetaminophen, 650 mg, Q6H PRN   Or  acetaminophen, 650 mg, Q6H PRN  morphine, 2 mg, Q4H PRN  morphine, 4 mg, Q4H PRN  mirtazapine, 30 mg, Nightly PRN  methocarbamol, 750 mg, TID PRN        Data:  CBC:   Recent Labs     09/11/22 0419   WBC 5.0   HGB 8.4*   HCT 26.4*   MCV 83.1        BMP:   Recent Labs     09/11/22 0419 09/13/22 0427    137   K 3.8 4.0   CL 98* 98*   CO2 33* 35*   BUN 5* 12   CREATININE <0.5* <0.5*     LIVER PROFILE:   Recent Labs     09/13/22 0427   AST 29   ALT 12   BILITOT 0.3   ALKPHOS 131*     PT/INR: No results for input(s): PROTIME, INR in the last 72 hours. CULTURES  COVID detected  Influenza A and B not detected  Blood cultures no growth to date     RADIOLOGY  XR CHEST PORTABLE   Final Result   1. Complete resolution of the previously noted right apical pneumothorax. XR CHEST PORTABLE   Final Result   1. Worsening small to moderate right apical pneumothorax. XR CHEST PORTABLE   Final Result   Diffuse bilateral honeycomb lung disease. No pneumothorax evident. Pigtail   partially in the right pleural space. XR CHEST PORTABLE   Final Result   Partial retraction of right chest tube. Distal side port may be extra   thoracic. There is perhaps minimal subcutaneous air. Small right apical pneumothorax, decreased in the interval.      Stable extensive interstitial pattern.          XR CHEST PORTABLE   Final Result   Increased size of right-sided pneumothorax measuring 1.6 cm with stable positioning of the right apically oriented pigtail thoracostomy tube. The findings were sent to the [facility] Results Communication Team at 15:57 on   9/10/2022 to be communicated to a licensed caregiver. XR CHEST PORTABLE   Final Result   Right chest tube is in normal position. No pneumothorax is evident. Interstitial opacities, correlating with extensive emphysematous disease. XR CHEST PORTABLE   Final Result   Interval placement of a locking loop chest tube at the right lung apex with   resolved pneumothorax. CT CHEST PULMONARY EMBOLISM W CONTRAST   Final Result   No evidence of pulmonary embolism      Right apical, anterior and lateral pneumothorax approximately 30%. Chest tube   extends along the right lateral chest wall however tip is extrapleural and   lateral to the upper ribcage      Coronary artery/atherosclerotic disease. Cardiomegaly      Extensive bullous changes and pulmonary fibrosis      Small right pleural effusion      RECOMMENDATIONS:   Re-evaluate for reinsertion of right chest tube         XR CHEST PORTABLE   Final Result   Nearly completely resolved right pneumothorax. Chest tube along the right   lateral hemithorax however tip appears extrapleural.         XR CHEST PORTABLE   Final Result   1. Moderate right-sided pneumothorax with approximately 4 cm of pleural   separation at the lung apex. No definite evidence of tension morphology at   this time.       2.  Chronic interstitial lung disease         XR CHEST PORTABLE    (Results Pending)         Assessment/Plan:  Acute on chronic respiratory failure  Hx of ILD on 3 L at home  Covid 19 infection   Right pneumothorax  -seen by pulmonology  -pt with hx of Pulmonary fibrosis  and Hx of cryptogenic organizing pneumonia with chronic resp failure with recurrent admissions  - imaging with right sided pneumothorax likely induced by cough s.p chest tube placement and resolved on repeat imaging.   -> Chest tube to suction  - doubt hypoxia due to covid , as it improved since chest tube is placed and now back on home level  - can wean steroids- resumed Pirfenidone   -Pulmonology consulted  -Continue Decadron 6 mg daily  -requiring 5 L O2 today, baseline O2 is 4 L  -CXR this AM with worsening pneumothorax, Chest tube had air leak, redressed, repeat CXR with complete resolution of pneumo     Chronic diastolic CHF  last echo as above, from 5/6/22 EF 55%  -BNP mildly elevated  - pt was taken of diuretics recently for weight loss   - monitor for fluid overload and resume lasix as needed only      S.p recent L1 burst fracture   -  s/p T11-L3 PDFF with L1 corpectomy at  ( 8/22)     DM type 2  -Has been taken off diuretics and diabetic meds for progressive weight loss   - hyperglycemia with steroids  -SSI  -continue to monitor BG     HTN  -BP stable  -on nadolol     HLD  -continue statin     Hypothyroidism  -continue synthroid     Iron Deficiency Anemia  - Recent Hemorrhoidal bleed -recent colonoscopy neg  - hb at 8 .4  -continue PO iron  -Hgb stable     Depression  Anxiety  -continue zoloft  -prn ativan    DVT Prophylaxis: Lovenox, being held   Diet: ADULT DIET; Regular; 4 carb choices (60 gm/meal)  ADULT ORAL NUTRITION SUPPLEMENT; Breakfast, Dinner;  Low Calorie/High Protein Oral Supplement  Code Status: Full Code         Subhash Dominique MD    9/13/2022

## 2022-09-13 NOTE — PROGRESS NOTES
Shift report given to nurse Swapnil Easley at bedside. Patient care handed off in stable condition at this time.  Sukumar Enriquez RN

## 2022-09-13 NOTE — PROGRESS NOTES
Pulmonary & Critical Care Medicine Progress Note  Hospital Day: 5   CC: COVID, pneumothorax    Subjective: Worsened pneumo on AM CXR. Re-established air leak & breathing is now better. Pleuritic pain. Vascular lines: IV: peripheral    MV:  none     / / /FiO2 : 100 %  No results for input(s): PHART, VZH5QIH, PO2ART in the last 72 hours. IV:   dextrose      sodium chloride       Vitals:  Blood pressure 118/65, pulse 72, temperature 99.1 °F (37.3 °C), temperature source Oral, resp. rate 18, height 5' 3\" (1.6 m), weight 119 lb 6 oz (54.1 kg), SpO2 97 %, not currently breastfeeding. on 8 L --> turned down to 5 L (97%)  Temp  Av.3 °F (37.4 °C)  Min: 99 °F (37.2 °C)  Max: 99.7 °F (37.6 °C)    Intake/Output Summary (Last 24 hours) at 2022 0612  Last data filed at 2022 1509  Gross per 24 hour   Intake --   Output 150 ml   Net -150 ml     PE:  General: ill appearing    Eyes: PERRL. No sclera icterus. No conjunctival injection. ENT: No discharge. Pharynx clear. Neck: Trachea midline. Normal thyroid. Resp: No accessory muscle use. + crackles. No wheezing. No rhonchi. No dullness on percussion. Chest tube on R is slightly retracted but still with intermittent air leak, re-dressed. CV: Regular rate. Regular rhythm. No mumur or rub. No edema. Peripheral pulses are 2+. Capillary refill is less than 3 seconds. GI: Non-tender. Non-distended. No masses. No organomegaly. Normal bowel sounds. No hernia. Skin: Warm and dry. No nodule on exposed extremities. No rash on exposed extremities. Lymph: No cervical LAD. No supraclavicular LAD. M/S: No cyanosis. No joint deformity. No clubbing. Neuro: A&OX3.  Patellar reflexes are symmetric  Psych: No agitation, no anxiety, affect is full     Scheduled Meds:   mupirocin   Nasal BID    dexamethasone  6 mg Oral Daily    albuterol sulfate HFA  2 puff Inhalation 4x daily    acetaminophen  1,000 mg Oral Once    atorvastatin  40 mg Oral Daily    guaiFENesin 1,200 mg Oral QAM    levothyroxine  125 mcg Oral Daily    magnesium oxide  400 mg Oral Daily    nadolol  20 mg Oral Daily    pantoprazole  40 mg Oral QAM AC    insulin lispro  0-4 Units SubCUTAneous TID WC    insulin lispro  0-4 Units SubCUTAneous Nightly    sodium chloride flush  5-40 mL IntraVENous 2 times per day    enoxaparin  40 mg SubCUTAneous Daily    sertraline  200 mg Oral Daily    Pirfenidone  3 tablet Oral TID     Data:  CBC:   Recent Labs     09/11/22 0419   WBC 5.0   HGB 8.4*   HCT 26.4*   MCV 83.1        BMP:   Recent Labs     09/11/22 0419 09/13/22 0427    137   K 3.8 4.0   CL 98* 98*   CO2 33* 35*   BUN 5* 12   CREATININE <0.5* <0.5*     LIVER PROFILE:   Recent Labs     09/13/22 0427   AST 29   ALT 12   BILITOT 0.3   ALKPHOS 131*     Microbiology:  9/9/2022 SARS-CoV-2 POSITIVE and influenza negative  9/9/2022 blood NGTD    Imaging:  CTPA 9/9/2022 bullous emphysema, pulmonary fibrosis, right-sided pneumothorax    CXR 9/11/2022 small right apical pneumothorax, possible retraction of chest tube    pCXR 9/13/2022:  Tube was clamped   Worsening small to moderate right apical pneumothorax. pCXR 9/13/2022: After unclamping  Complete resolution of the previously noted right apical pneumothorax.     ASSESSMENT:  Acute on chronic hypoxemic respiratory failure, on 3 L home oxygen  COVID-19 pneumonia  Secondary right-sided spontaneous pneumothorax - worsened on AM CXR, but air leak is now re-established  Chest tube placed 9/9/22  ILD, h/o  & NSIP v chronic HP now with acute exacerbation   Severe bullous changes/paraseptal emphysema - no tobacco history   Chronic diastolic CHF  Z73-N0 PDFF withL1 corpectomy @  8/22/22 for L1 burst fracture  DM      PLAN:  - COVID-19 isolation, droplet plus  -   Supplemental oxygen to maintain SaO2 >92%; wean as tolerated    Decadron 6 mg daily   Home pirfenidone, monitoring for toxicity - checked LFT today  Inhaled bronchodilators   Chest tube to suction, daily CXR   Repeat CXR after re-establishing air leak - complete resolution   SSI  PRN lasix   Prophylactic Lovenox unheld. No plan to replace chest tube. I spent a total of 17 minutes on this encounter on chart review, history taking and review of data. I independently performed a physical exam and updated this encounter note as needed. [place], [place] on 9/13/22 at 8:34 AM EDT    I spent a total of 10 minutes on this encounter personally obtaining the patient history, reviewing labs, imaging and other data. I independently performed the above physical exam and updated this encounter note, assessment and plan as needed.   Amara Adame MD on 9/13/22 at 11:57 AM EDT

## 2022-09-13 NOTE — PROGRESS NOTES
Bedside report and [facility] of care [facility]. Pt currently resting in bed with the call light within reach. Pt denies any other care needs at this time. Pt stable at this time.

## 2022-09-13 NOTE — ACP (ADVANCE CARE PLANNING)
Advance Care Planning     General Advance Care Planning (ACP) Conversation    Date of Conversation: 9/9/2022  Conducted with: Patient with Decision Making Capacity   Healthcare Decision Maker: Named in Advance Directive or Healthcare Power of  (name) Luciano Liang, spouse    Healthcare Decision Maker:    Primary Decision Maker: Helene Hewitt - Spouse - [phone]    Secondary Decision Maker: Rosa Isela Callaway - Child - [phone]  Click here to complete Healthcare Decision Makers including selection of the Healthcare Decision Maker Relationship (ie \"Primary\"). Today we documented Decision Maker(s) consistent with ACP documents on file. Content/Action Overview:   Has ACP document(s) on file - reflects the patient's care preferences  Reviewed DNR/DNI and patient elects Full Code (Attempt Resuscitation)        Length of Voluntary ACP Conversation in minutes:  <16 minutes (Non-Billable)    Julia Bay RN
No

## 2022-09-13 NOTE — PROGRESS NOTES
Shift assessment completed. See flow sheet. Medications given. Patient is A&O x4. Chest tube is in place @ -20 wall suction. Output from chest tube noted at 80 mL. No crepitus noted. Vitals are stable. Patient denies further needs at this time. Call light within reach.

## 2022-09-13 NOTE — PROGRESS NOTES
Morphine given for pain 6 out of 10 on scale. No additional needs at this time.  Remberto Rollins, RN

## 2022-09-14 ENCOUNTER — APPOINTMENT (OUTPATIENT)
Dept: GENERAL RADIOLOGY | Age: 67
DRG: 177 | End: 2022-09-14
Payer: MEDICARE

## 2022-09-14 LAB
ANION GAP SERPL CALCULATED.3IONS-SCNC: 5 MMOL/L (ref 3–16)
BASOPHILS ABSOLUTE: 0 K/UL (ref 0–0.2)
BASOPHILS RELATIVE PERCENT: 0.4 %
BLOOD CULTURE, ROUTINE: NORMAL
BUN BLDV-MCNC: 14 MG/DL (ref 7–20)
CALCIUM SERPL-MCNC: 8.4 MG/DL (ref 8.3–10.6)
CHLORIDE BLD-SCNC: 98 MMOL/L (ref 99–110)
CO2: 35 MMOL/L (ref 21–32)
CREAT SERPL-MCNC: <0.5 MG/DL (ref 0.6–1.2)
CULTURE, BLOOD 2: NORMAL
EOSINOPHILS ABSOLUTE: 0.8 K/UL (ref 0–0.6)
EOSINOPHILS RELATIVE PERCENT: 6.4 %
GFR AFRICAN AMERICAN: >60
GFR NON-AFRICAN AMERICAN: >60
GLUCOSE BLD-MCNC: 109 MG/DL (ref 70–99)
GLUCOSE BLD-MCNC: 113 MG/DL (ref 70–99)
GLUCOSE BLD-MCNC: 129 MG/DL (ref 70–99)
GLUCOSE BLD-MCNC: 82 MG/DL (ref 70–99)
GLUCOSE BLD-MCNC: 91 MG/DL (ref 70–99)
HCT VFR BLD CALC: 30.6 % (ref 36–48)
HEMOGLOBIN: 9.9 G/DL (ref 12–16)
LYMPHOCYTES ABSOLUTE: 3.4 K/UL (ref 1–5.1)
LYMPHOCYTES RELATIVE PERCENT: 28.6 %
MCH RBC QN AUTO: 26.8 PG (ref 26–34)
MCHC RBC AUTO-ENTMCNC: 32.2 G/DL (ref 31–36)
MCV RBC AUTO: 83.1 FL (ref 80–100)
MONOCYTES ABSOLUTE: 0.9 K/UL (ref 0–1.3)
MONOCYTES RELATIVE PERCENT: 7.7 %
NEUTROPHILS ABSOLUTE: 6.8 K/UL (ref 1.7–7.7)
NEUTROPHILS RELATIVE PERCENT: 56.9 %
PDW BLD-RTO: 16.1 % (ref 12.4–15.4)
PERFORMED ON: ABNORMAL
PERFORMED ON: NORMAL
PLATELET # BLD: 349 K/UL (ref 135–450)
PMV BLD AUTO: 6.5 FL (ref 5–10.5)
POTASSIUM REFLEX MAGNESIUM: 3.9 MMOL/L (ref 3.5–5.1)
RBC # BLD: 3.69 M/UL (ref 4–5.2)
SODIUM BLD-SCNC: 138 MMOL/L (ref 136–145)
WBC # BLD: 12 K/UL (ref 4–11)

## 2022-09-14 PROCEDURE — 99233 SBSQ HOSP IP/OBS HIGH 50: CPT | Performed by: INTERNAL MEDICINE

## 2022-09-14 PROCEDURE — 6360000002 HC RX W HCPCS

## 2022-09-14 PROCEDURE — 36415 COLL VENOUS BLD VENIPUNCTURE: CPT

## 2022-09-14 PROCEDURE — 6360000002 HC RX W HCPCS: Performed by: INTERNAL MEDICINE

## 2022-09-14 PROCEDURE — 71045 X-RAY EXAM CHEST 1 VIEW: CPT

## 2022-09-14 PROCEDURE — 2580000003 HC RX 258: Performed by: INTERNAL MEDICINE

## 2022-09-14 PROCEDURE — 6370000000 HC RX 637 (ALT 250 FOR IP): Performed by: INTERNAL MEDICINE

## 2022-09-14 PROCEDURE — 2060000000 HC ICU INTERMEDIATE R&B

## 2022-09-14 PROCEDURE — 99232 SBSQ HOSP IP/OBS MODERATE 35: CPT | Performed by: INTERNAL MEDICINE

## 2022-09-14 PROCEDURE — 6370000000 HC RX 637 (ALT 250 FOR IP)

## 2022-09-14 PROCEDURE — 94640 AIRWAY INHALATION TREATMENT: CPT

## 2022-09-14 PROCEDURE — 85025 COMPLETE CBC W/AUTO DIFF WBC: CPT

## 2022-09-14 PROCEDURE — 2700000000 HC OXYGEN THERAPY PER DAY

## 2022-09-14 PROCEDURE — 94761 N-INVAS EAR/PLS OXIMETRY MLT: CPT

## 2022-09-14 PROCEDURE — 80048 BASIC METABOLIC PNL TOTAL CA: CPT

## 2022-09-14 RX ORDER — BENZONATATE 100 MG/1
200 CAPSULE ORAL 3 TIMES DAILY
Status: DISCONTINUED | OUTPATIENT
Start: 2022-09-14 | End: 2022-09-16

## 2022-09-14 RX ORDER — LOPERAMIDE HYDROCHLORIDE 2 MG/1
2 CAPSULE ORAL 4 TIMES DAILY PRN
Status: DISCONTINUED | OUTPATIENT
Start: 2022-09-14 | End: 2022-09-23 | Stop reason: HOSPADM

## 2022-09-14 RX ORDER — ENOXAPARIN SODIUM 100 MG/ML
40 INJECTION SUBCUTANEOUS DAILY
Status: DISCONTINUED | OUTPATIENT
Start: 2022-09-14 | End: 2022-09-16

## 2022-09-14 RX ORDER — GUAIFENESIN/DEXTROMETHORPHAN 100-10MG/5
5 SYRUP ORAL EVERY 4 HOURS PRN
Status: CANCELLED | OUTPATIENT
Start: 2022-09-14

## 2022-09-14 RX ORDER — GUAIFENESIN/DEXTROMETHORPHAN 100-10MG/5
5 SYRUP ORAL EVERY 4 HOURS PRN
Status: DISCONTINUED | OUTPATIENT
Start: 2022-09-14 | End: 2022-09-23 | Stop reason: HOSPADM

## 2022-09-14 RX ADMIN — SODIUM CHLORIDE, PRESERVATIVE FREE 10 ML: 5 INJECTION INTRAVENOUS at 08:41

## 2022-09-14 RX ADMIN — Medication 2 PUFF: at 16:32

## 2022-09-14 RX ADMIN — SODIUM CHLORIDE, PRESERVATIVE FREE 10 ML: 5 INJECTION INTRAVENOUS at 21:54

## 2022-09-14 RX ADMIN — BENZONATATE 100 MG: 100 CAPSULE ORAL at 03:50

## 2022-09-14 RX ADMIN — MORPHINE SULFATE 2 MG: 2 INJECTION, SOLUTION INTRAMUSCULAR; INTRAVENOUS at 15:14

## 2022-09-14 RX ADMIN — HYDROXYZINE HYDROCHLORIDE 10 MG: 10 TABLET ORAL at 10:15

## 2022-09-14 RX ADMIN — Medication 2 PUFF: at 08:07

## 2022-09-14 RX ADMIN — BENZONATATE 200 MG: 100 CAPSULE ORAL at 14:26

## 2022-09-14 RX ADMIN — Medication 2 PUFF: at 11:52

## 2022-09-14 RX ADMIN — PANTOPRAZOLE SODIUM 40 MG: 40 TABLET, DELAYED RELEASE ORAL at 06:33

## 2022-09-14 RX ADMIN — LEVOTHYROXINE SODIUM 125 MCG: 125 TABLET ORAL at 06:33

## 2022-09-14 RX ADMIN — ONDANSETRON HYDROCHLORIDE 4 MG: 2 INJECTION, SOLUTION INTRAMUSCULAR; INTRAVENOUS at 08:33

## 2022-09-14 RX ADMIN — BENZONATATE 200 MG: 100 CAPSULE ORAL at 21:53

## 2022-09-14 RX ADMIN — LOPERAMIDE HYDROCHLORIDE 2 MG: 2 CAPSULE ORAL at 18:26

## 2022-09-14 RX ADMIN — BENZONATATE 100 MG: 100 CAPSULE ORAL at 10:15

## 2022-09-14 RX ADMIN — Medication 2 PUFF: at 20:07

## 2022-09-14 RX ADMIN — DEXAMETHASONE 6 MG: 4 TABLET ORAL at 08:30

## 2022-09-14 RX ADMIN — MORPHINE SULFATE 4 MG: 4 INJECTION, SOLUTION INTRAMUSCULAR; INTRAVENOUS at 00:06

## 2022-09-14 RX ADMIN — Medication 10 ML: at 08:35

## 2022-09-14 RX ADMIN — MORPHINE SULFATE 4 MG: 4 INJECTION, SOLUTION INTRAMUSCULAR; INTRAVENOUS at 21:53

## 2022-09-14 RX ADMIN — MUPIROCIN: 20 OINTMENT TOPICAL at 08:32

## 2022-09-14 RX ADMIN — SERTRALINE 200 MG: 100 TABLET, FILM COATED ORAL at 08:28

## 2022-09-14 RX ADMIN — NADOLOL 20 MG: 40 TABLET ORAL at 08:27

## 2022-09-14 RX ADMIN — ENOXAPARIN SODIUM 40 MG: 100 INJECTION SUBCUTANEOUS at 21:53

## 2022-09-14 ASSESSMENT — PAIN DESCRIPTION - LOCATION
LOCATION: BACK

## 2022-09-14 ASSESSMENT — PAIN SCALES - GENERAL
PAINLEVEL_OUTOF10: 7
PAINLEVEL_OUTOF10: 5
PAINLEVEL_OUTOF10: 7

## 2022-09-14 ASSESSMENT — PAIN DESCRIPTION - DIRECTION
RADIATING_TOWARDS: NON
RADIATING_TOWARDS: NON

## 2022-09-14 ASSESSMENT — PAIN DESCRIPTION - DESCRIPTORS
DESCRIPTORS: STABBING
DESCRIPTORS: DISCOMFORT
DESCRIPTORS: ACHING;STABBING
DESCRIPTORS: DISCOMFORT

## 2022-09-14 ASSESSMENT — PAIN DESCRIPTION - ORIENTATION
ORIENTATION: RIGHT

## 2022-09-14 ASSESSMENT — PAIN DESCRIPTION - ONSET
ONSET: ON-GOING
ONSET: ON-GOING

## 2022-09-14 ASSESSMENT — PAIN DESCRIPTION - PAIN TYPE
TYPE: SURGICAL PAIN
TYPE: SURGICAL PAIN

## 2022-09-14 ASSESSMENT — PAIN DESCRIPTION - FREQUENCY
FREQUENCY: CONTINUOUS
FREQUENCY: CONTINUOUS

## 2022-09-14 ASSESSMENT — PAIN SCALES - WONG BAKER: WONGBAKER_NUMERICALRESPONSE: 0

## 2022-09-14 NOTE — PROGRESS NOTES
Shift report given to nurse Jeferson Serrano at bedside. Patient care handed off in stable condition at this time.  Farshad Sibley RN

## 2022-09-14 NOTE — PROGRESS NOTES
Patient's O2 tubing changed out by this RN, patient remains on 4L  Patient comfort a priority. No additional needs at this time.  Ross Parikh RN

## 2022-09-14 NOTE — PROGRESS NOTES
Patient is laying with eyes closed. No s/s of distress at this time. Call light and bedside table within reach. Will continue to monitor. [Consultation] : a consultation regarding [Dyspnea] : dyspnea

## 2022-09-14 NOTE — PROGRESS NOTES
Vitals are stable. Patient is awake and alert. Chest tube remains unclamped, connected to continuous suction with no drainage. No crepitus noted and dressing is clean and intact. Patient is on 6L of O2. Patient denies further needs at this time. Call light within reach.

## 2022-09-14 NOTE — PROGRESS NOTES
IM Progress Note    Admit Date:  9/9/2022    Interval history:    ILD with right pneumothorax, covid positive  S.p chest tube placement , back to home o2 of 4 L     Subjective:    Ms. Obando stable . Anabella Yi Complains of pain at chest tube site. Chest tube was clamped today, repeat x-ray shows small apical pneumothorax . Chest tube placed back to suction. .  Patient denies any shortness of breath. .    She is having multiple diarrheal stools, discussed with patient's nurse, patient has been taking 2 bowls of tomato soup with every meal.  Patient advised to adjust her diet. Oxygen saturation stable, on 4 L   Looks weak, fatigued         Objective:   Patient Vitals for the past 4 hrs:   BP Temp Temp src Pulse Resp SpO2   09/14/22 1430 122/69 98.6 °F (37 °C) Oral 78 18 98 %   09/14/22 1153 -- -- -- 71 18 96 %            Intake/Output Summary (Last 24 hours) at 9/14/2022 1522  Last data filed at 9/14/2022 0903  Gross per 24 hour   Intake 120 ml   Output 185 ml   Net -65 ml         Physical Exam:    Gen: No distress. Alert. Awake, oriented . Thin , elderly female . Looks ill , fatigued   Eyes: PERRL. No sclera icterus. No conjunctival injection. ENT: No discharge. Pharynx clear. Neck: No JVD. Trachea midline. Resp: No accessory muscle use. Diminished breath sounds . No crackles. No wheezes. No rhonchi. R chest tube to suction   CV: Regular rate. Regular rhythm. No murmur. No rub. No edema. Capillary Refill: Brisk,< 3 seconds   Peripheral Pulses: +2 palpable, equal bilaterally   GI: Non-tender. Non-distended. Normal bowel sounds. Skin: Warm and dry. No nodule on exposed extremities. No rash on exposed extremities. M/S: No cyanosis. No joint deformity. No clubbing. Neuro: Awake. Grossly nonfocal    Psych: Oriented x 3. No anxiety or agitation.          Medications:  benzonatate, 200 mg, TID  mupirocin, , BID  dexamethasone, 6 mg, Daily  albuterol sulfate HFA, 2 puff, 4x daily  acetaminophen, 1,000 mg, Once  atorvastatin, 40 mg, Daily  guaiFENesin, 1,200 mg, QAM  levothyroxine, 125 mcg, Daily  magnesium oxide, 400 mg, Daily  nadolol, 20 mg, Daily  pantoprazole, 40 mg, QAM AC  insulin lispro, 0-4 Units, TID WC  insulin lispro, 0-4 Units, Nightly  sodium chloride flush, 5-40 mL, 2 times per day  [Held by provider] enoxaparin, 40 mg, Daily  sertraline, 200 mg, Daily  Pirfenidone, 3 tablet, TID    PRN Medications:  loperamide, 2 mg, 4x Daily PRN  albuterol sulfate HFA, 2 puff, Q6H PRN  hydrOXYzine HCl, 10 mg, Q8H PRN  glucose, 4 tablet, PRN  dextrose bolus, 125 mL, PRN   Or  dextrose bolus, 250 mL, PRN  glucagon (rDNA), 1 mg, PRN  dextrose, , Continuous PRN  sodium chloride flush, 5-40 mL, PRN  sodium chloride, , PRN  ondansetron, 4 mg, Q8H PRN   Or  ondansetron, 4 mg, Q6H PRN  polyethylene glycol, 17 g, Daily PRN  acetaminophen, 650 mg, Q6H PRN   Or  acetaminophen, 650 mg, Q6H PRN  morphine, 2 mg, Q4H PRN  morphine, 4 mg, Q4H PRN  mirtazapine, 30 mg, Nightly PRN  methocarbamol, 750 mg, TID PRN        Data:  CBC:   Recent Labs     09/14/22 0440   WBC 12.0*   HGB 9.9*   HCT 30.6*   MCV 83.1          BMP:   Recent Labs     09/13/22 0427 09/14/22  0440    138   K 4.0 3.9   CL 98* 98*   CO2 35* 35*   BUN 12 14   CREATININE <0.5* <0.5*       LIVER PROFILE:   Recent Labs     09/13/22 0427   AST 29   ALT 12   BILITOT 0.3   ALKPHOS 131*       PT/INR: No results for input(s): PROTIME, INR in the last 72 hours. CULTURES  COVID detected  Influenza A and B not detected  Blood cultures no growth to date     RADIOLOGY  XR CHEST PORTABLE   Final Result   No change position of the right chest tube with a new small right apical   pneumothorax and new subcutaneous air along the right chest wall. Some of   the holes in the pigtail catheter are within the chest wall. Otherwise stable appearance of chest with diffuse interstitial prominence.          XR CHEST PORTABLE   Final Result   No pneumothorax on the right.  Pleural drainage catheter seen on the right      Fibrotic changes in the lungs, similar to prior         XR CHEST PORTABLE   Final Result   1. Complete resolution of the previously noted right apical pneumothorax. XR CHEST PORTABLE   Final Result   1. Worsening small to moderate right apical pneumothorax. XR CHEST PORTABLE   Final Result   Diffuse bilateral honeycomb lung disease. No pneumothorax evident. Pigtail   partially in the right pleural space. XR CHEST PORTABLE   Final Result   Partial retraction of right chest tube. Distal side port may be extra   thoracic. There is perhaps minimal subcutaneous air. Small right apical pneumothorax, decreased in the interval.      Stable extensive interstitial pattern. XR CHEST PORTABLE   Final Result   Increased size of right-sided pneumothorax measuring 1.6 cm with stable   positioning of the right apically oriented pigtail thoracostomy tube. The findings were sent to the [facility] Results Communication Team at 15:57 on   9/10/2022 to be communicated to a licensed caregiver. XR CHEST PORTABLE   Final Result   Right chest tube is in normal position. No pneumothorax is evident. Interstitial opacities, correlating with extensive emphysematous disease. XR CHEST PORTABLE   Final Result   Interval placement of a locking loop chest tube at the right lung apex with   resolved pneumothorax. CT CHEST PULMONARY EMBOLISM W CONTRAST   Final Result   No evidence of pulmonary embolism      Right apical, anterior and lateral pneumothorax approximately 30%. Chest tube   extends along the right lateral chest wall however tip is extrapleural and   lateral to the upper ribcage      Coronary artery/atherosclerotic disease.   Cardiomegaly      Extensive bullous changes and pulmonary fibrosis      Small right pleural effusion      RECOMMENDATIONS:   Re-evaluate for reinsertion of right chest tube         XR CHEST PORTABLE   Final Result   Nearly completely resolved right pneumothorax. Chest tube along the right   lateral hemithorax however tip appears extrapleural.         XR CHEST PORTABLE   Final Result   1. Moderate right-sided pneumothorax with approximately 4 cm of pleural   separation at the lung apex. No definite evidence of tension morphology at   this time.       2.  Chronic interstitial lung disease         XR CHEST PORTABLE    (Results Pending)         Assessment/Plan:  Acute on chronic respiratory failure  Hx of ILD on 3 L at home  Covid 19 infection   Right pneumothorax  -seen by pulmonology  -pt with hx of Pulmonary fibrosis  and Hx of cryptogenic organizing pneumonia with chronic resp failure with recurrent admissions  - imaging with right sided pneumothorax likely induced by cough   -> s.p chest tube placement and resolved on repeat imaging--> continue chest tube to suction  - doubt hypoxia due to covid , as it improved since chest tube is placed and now back on home level  - can wean steroids- resumed Pirfenidone   -Pulmonology consulted  -Continue Decadron 6 mg daily  -Was requiring O2 up to 5 L , oxygen saturation now stable at baseline O2 4 L     Chronic diastolic CHF  last echo as above, from 5/6/22 EF 55%  -BNP mildly elevated  - pt was taken of diuretics recently for weight loss   - monitor for fluid overload and resume lasix as needed only      S.p recent L1 burst fracture   -  s/p T11-L3 PDFF with L1 corpectomy at  ( 8/22)     DM type 2  -Has been taken off diuretics and diabetic meds for progressive weight loss   - hyperglycemia with steroids  -SSI  -continue to monitor BG     HTN  -BP stable  -on nadolol     HLD  -continue statin     Hypothyroidism  -continue synthroid     Iron Deficiency Anemia  - Recent Hemorrhoidal bleed -recent colonoscopy neg  - hb at 8 .4  -continue PO iron  -Hgb stable     Depression  Anxiety  -continue zoloft  -prn ativan    Diarrhea  -Imodium as needed  Cut down intake of tomato soup    DVT Prophylaxis: Lovenox, being held   Diet: ADULT DIET; Regular; 4 carb choices (60 gm/meal)  ADULT ORAL NUTRITION SUPPLEMENT; Breakfast, Dinner;  Low Calorie/High Protein Oral Supplement  Code Status: Full Code         Nora Bolaños MD    9/14/2022

## 2022-09-14 NOTE — PROGRESS NOTES
Pulmonary & Critical Care Medicine Progress Note  Hospital Day: 6   CC: COVID, pneumothorax    Subjective:   Down to 3 L O2. Breathing feels good. Productive cough & nausea. No air leak overnight per nursing, or today. However, small PTX recurred after 4 hours clamping     IV: peripheral    PHYSICAL EXAM:  Blood pressure 124/62, pulse 78, temperature 98.6 °F (37 °C), temperature source Oral, resp. rate 18, height 5' 3\" (1.6 m), weight 116 lb 5 oz (52.8 kg), SpO2 96 %, not currently breastfeeding. on 3 L   General: ill appearing    Eyes: PERRL. No sclera icterus. No conjunctival injection. ENT: No discharge. Pharynx clear. Neck: Trachea midline. Normal thyroid. Resp: No accessory muscle use. + crackles. No wheezing. No rhonchi. No dullness on percussion. Chest tube on R with no air leak, slightly more retracted again today. CV: Regular rate. Regular rhythm. No mumur or rub. No edema. Peripheral pulses are 2+. Capillary refill is less than 3 seconds. GI: Non-tender. Non-distended. No masses. No organomegaly. Normal bowel sounds. No hernia. Skin: Warm and dry. No nodule on exposed extremities. No rash on exposed extremities. Lymph: No cervical LAD. No supraclavicular LAD. M/S: No cyanosis. No joint deformity. No clubbing. Neuro: A&OX3.  Patellar reflexes are symmetric  Psych: No agitation, no anxiety, affect is full     Scheduled Meds:   mupirocin   Nasal BID    dexamethasone  6 mg Oral Daily    albuterol sulfate HFA  2 puff Inhalation 4x daily    acetaminophen  1,000 mg Oral Once    atorvastatin  40 mg Oral Daily    guaiFENesin  1,200 mg Oral QAM    levothyroxine  125 mcg Oral Daily    magnesium oxide  400 mg Oral Daily    nadolol  20 mg Oral Daily    pantoprazole  40 mg Oral QAM AC    insulin lispro  0-4 Units SubCUTAneous TID WC    insulin lispro  0-4 Units SubCUTAneous Nightly    sodium chloride flush  5-40 mL IntraVENous 2 times per day    [Held by provider] enoxaparin  40 mg SubCUTAneous Daily sertraline  200 mg Oral Daily    Pirfenidone  3 tablet Oral TID     Data:  CBC:   Recent Labs     09/14/22  0440   WBC 12.0*   HGB 9.9*   HCT 30.6*   MCV 83.1        BMP:   Recent Labs     09/13/22 0427 09/14/22 0440    138   K 4.0 3.9   CL 98* 98*   CO2 35* 35*   BUN 12 14   CREATININE <0.5* <0.5*     LIVER PROFILE:   Recent Labs     09/13/22 0427   AST 29   ALT 12   BILITOT 0.3   ALKPHOS 131*     Microbiology:  9/9/2022 SARS-CoV-2 POSITIVE  and influenza negative  9/9/2022 blood NGTD    Imaging:  CTPA 9/9/2022:  bullous emphysema, pulmonary fibrosis, right-sided pneumothorax    CXR 9/11/2022:  small right apical pneumothorax, possible retraction of chest tube    pCXR 9/13/2022:  found tube had been clamped   Worsening small to moderate right apical pneumothorax. pCXR 9/13/2022: After unclamping  Complete resolution of the previously noted right apical pneumothorax. pCXR 9/14/2022 AM:  No pneumothorax on the right. Pleural drainage catheter seen on the right. Fibrotic changes in the lungs, similar to prior     pCXR 9/14/2022:  with tube clamped 4 hrs with small apical PTX       ASSESSMENT:  Acute on chronic hypoxemic respiratory failure, on 3 L home oxygen  COVID-19 pneumonia  Secondary  right-sided spontaneous pneumothorax  Chest tube placed 9/9/22  ILD, h/o  & NSIP v chronic HP now with acute exacerbation   Severe bullous changes/paraseptal emphysema - no tobacco history   Chronic diastolic CHF  D97-N3 PDFF withL1 corpectomy @  8/22/22 for L1 burst fracture      PLAN:  - COVID-19 isolation, droplet plus  -  Supplemental oxygen to maintain SaO2 >92%; wean as tolerated    Decadron D#6, 6 mg daily   Home pirfenidone, monitoring for toxicity    Inhaled bronchodilators   Chest tube to -20 suction, daily CXR  Tube clamped 09:00, repeat CXR    I spent a total of 14 minutes on this encounter personally obtaining the patient history, reviewing labs, imaging and other data.   I independently performed the above physical exam and updated this encounter note, assessment and plan as needed. Jennifer Blackmon MD on 9/14/22 at 2:44 PM EDT    I spent a total of 11 minutes on this encounter on chart review, history taking and review of data. I independently performed a physical exam and updated this encounter note as needed.    Roge Ramos PA-C on 9/14/22 at 9:11 AM EDT

## 2022-09-14 NOTE — CARE COORDINATION
INTERDISCIPLINARY PLAN OF CARE CONFERENCE    Date/Time: 9/14/2022 2:20 PM  Completed by: Pedro Landon RN, Case Management      Patient Name:  Slime Obando  YOB: 1955  Admitting Diagnosis: Pneumonia due to COVID-19 virus [U07.1, J12.82]     Admit Date/Time:  9/9/2022  5:21 PM    Chart reviewed. Interdisciplinary team contacted or reviewed plan related to patient progress and discharge plans. Disciplines included Case Management, Nursing, and Dietitian. Current Status:IP 09/10/2022  PT/OT recommendation for discharge plan of care: NA    Expected D/C Disposition:  Home    Discharge Plan Comments: Chart review. IPTA. Living w/dtr temporarily while recovering and plans to return to daughters home. [facility] w/[facility] and [facility] home O2. CT clamped today. Repeat CXR at 2 PM if negative CT will be removed. Anticipate DC 1-2 days. CM will follow for resumption of HHC on DC. Home O2 in place on admit: Yes  Pt informed of need to bring portable home O2 tank on day of discharge for nursing to connect prior to leaving:  Yes  Verbalized agreement/Understanding:   Yes

## 2022-09-14 NOTE — PROGRESS NOTES
Shift assessment complete, morning medications given, patient resting with no complaints of pain, will continue to monitor.  Tino Sierra RN

## 2022-09-14 NOTE — PROGRESS NOTES
Bedside report and transfer of care given to [facility] [place]. Pt currently resting in bed with the call light within reach. Pt denies any other care needs at this time. Pt stable at this time.

## 2022-09-14 NOTE — PROGRESS NOTES
Patient is awake. Chest tube secure, No crepitus noted. Chest tube remains unclamped and on continuous suction. No drainage  noted.

## 2022-09-14 NOTE — PROGRESS NOTES
RT Inhaler-Nebulizer Bronchodilator Protocol Note    There is a bronchodilator order in the chart from a provider indicating to follow the RT Bronchodilator Protocol and there is an Initiate RT Inhaler-Nebulizer Bronchodilator Protocol order as well (see protocol at bottom of note). CXR Findings:  XR CHEST PORTABLE    Result Date: 9/13/2022  1. Complete resolution of the previously noted right apical pneumothorax. XR CHEST PORTABLE    Result Date: 9/13/2022  1. Worsening small to moderate right apical pneumothorax. XR CHEST PORTABLE    Result Date: 9/12/2022  Diffuse bilateral honeycomb lung disease. No pneumothorax evident. Pigtail partially in the right pleural space. The findings from the last RT Protocol Assessment were as follows:   History Pulmonary Disease: Chronic pulmonary disease  Respiratory Pattern: Dyspnea on exertion or RR 21-25 bpm  Breath Sounds: Slightly diminished and/or crackles  Cough: Strong, spontaneous, non-productive  Indication for Bronchodilator Therapy: On home bronchodilators  Bronchodilator Assessment Score: 6    Aerosolized bronchodilator medication orders have been revised according to the RT Inhaler-Nebulizer Bronchodilator Protocol below. Respiratory Therapist to perform RT Therapy Protocol Assessment initially then follow the protocol. Repeat RT Therapy Protocol Assessment PRN for score 0-3 or on second treatment, BID, and PRN for scores above 3. No Indications - adjust the frequency to every 6 hours PRN wheezing or bronchospasm, if no treatments needed after 48 hours then discontinue using Per Protocol order mode. If indication present, adjust the RT bronchodilator orders based on the Bronchodilator Assessment Score as indicated below.   Use Inhaler orders unless patient has one or more of the following: on home nebulizer, not able to hold breath for 10 seconds, is not alert and oriented, cannot activate and use MDI correctly, or respiratory rate 25 breaths per minute or more, then use the equivalent nebulizer order(s) with same Frequency and PRN reasons based on the score. If a patient is on this medication at home then do not decrease Frequency below that used at home. 0-3 - enter or revise RT bronchodilator order(s) to equivalent RT Bronchodilator order with Frequency of every 4 hours PRN for wheezing or increased work of breathing using Per Protocol order mode. 4-6 - enter or revise RT Bronchodilator order(s) to two equivalent RT bronchodilator orders with one order with BID Frequency and one order with Frequency of every 4 hours PRN wheezing or increased work of breathing using Per Protocol order mode. 7-10 - enter or revise RT Bronchodilator order(s) to two equivalent RT bronchodilator orders with one order with TID Frequency and one order with Frequency of every 4 hours PRN wheezing or increased work of breathing using Per Protocol order mode. 11-13 - enter or revise RT Bronchodilator order(s) to one equivalent RT bronchodilator order with QID Frequency and an Albuterol order with Frequency of every 4 hours PRN wheezing or increased work of breathing using Per Protocol order mode. Greater than 13 - enter or revise RT Bronchodilator order(s) to one equivalent RT bronchodilator order with every 4 hours Frequency and an Albuterol order with Frequency of every 2 hours PRN wheezing or increased work of breathing using Per Protocol order mode.          Electronically signed by Jana Harding RCP on 9/13/2022 at 8:08 PM

## 2022-09-14 NOTE — PROGRESS NOTES
RT Inhaler-Nebulizer Bronchodilator Protocol Note    There is a bronchodilator order in the chart from a provider indicating to follow the RT Bronchodilator Protocol and there is an Initiate RT Inhaler-Nebulizer Bronchodilator Protocol order as well (see protocol at bottom of note). CXR Findings:  XR CHEST PORTABLE    Result Date: 9/13/2022  1. Complete resolution of the previously noted right apical pneumothorax. XR CHEST PORTABLE    Result Date: 9/13/2022  1. Worsening small to moderate right apical pneumothorax. XR CHEST PORTABLE    Result Date: 9/12/2022  Diffuse bilateral honeycomb lung disease. No pneumothorax evident. Pigtail partially in the right pleural space. The findings from the last RT Protocol Assessment were as follows:   History Pulmonary Disease: Chronic pulmonary disease  Respiratory Pattern: Dyspnea on exertion or RR 21-25 bpm  Breath Sounds: Slightly diminished and/or crackles  Cough: Strong, spontaneous, non-productive  Indication for Bronchodilator Therapy: On home bronchodilators  Bronchodilator Assessment Score: 6    Aerosolized bronchodilator medication orders have been revised according to the RT Inhaler-Nebulizer Bronchodilator Protocol below. Respiratory Therapist to perform RT Therapy Protocol Assessment initially then follow the protocol. Repeat RT Therapy Protocol Assessment PRN for score 0-3 or on second treatment, BID, and PRN for scores above 3. No Indications - adjust the frequency to every 6 hours PRN wheezing or bronchospasm, if no treatments needed after 48 hours then discontinue using Per Protocol order mode. If indication present, adjust the RT bronchodilator orders based on the Bronchodilator Assessment Score as indicated below.   Use Inhaler orders unless patient has one or more of the following: on home nebulizer, not able to hold breath for 10 seconds, is not alert and oriented, cannot activate and use MDI correctly, or respiratory rate 25 breaths per minute or more, then use the equivalent nebulizer order(s) with same Frequency and PRN reasons based on the score. If a patient is on this medication at home then do not decrease Frequency below that used at home. 0-3 - enter or revise RT bronchodilator order(s) to equivalent RT Bronchodilator order with Frequency of every 4 hours PRN for wheezing or increased work of breathing using Per Protocol order mode. 4-6 - enter or revise RT Bronchodilator order(s) to two equivalent RT bronchodilator orders with one order with BID Frequency and one order with Frequency of every 4 hours PRN wheezing or increased work of breathing using Per Protocol order mode. 7-10 - enter or revise RT Bronchodilator order(s) to two equivalent RT bronchodilator orders with one order with TID Frequency and one order with Frequency of every 4 hours PRN wheezing or increased work of breathing using Per Protocol order mode. 11-13 - enter or revise RT Bronchodilator order(s) to one equivalent RT bronchodilator order with QID Frequency and an Albuterol order with Frequency of every 4 hours PRN wheezing or increased work of breathing using Per Protocol order mode. Greater than 13 - enter or revise RT Bronchodilator order(s) to one equivalent RT bronchodilator order with every 4 hours Frequency and an Albuterol order with Frequency of every 2 hours PRN wheezing or increased work of breathing using Per Protocol order mode. RT to enter RT Home Evaluation for COPD & MDI Assessment order using Per Protocol order mode.     Electronically signed by Lyndon Champagne on 9/14/2022 at 8:13 AM

## 2022-09-15 ENCOUNTER — APPOINTMENT (OUTPATIENT)
Dept: GENERAL RADIOLOGY | Age: 67
DRG: 177 | End: 2022-09-15
Payer: MEDICARE

## 2022-09-15 ENCOUNTER — APPOINTMENT (OUTPATIENT)
Dept: CT IMAGING | Age: 67
DRG: 177 | End: 2022-09-15
Payer: MEDICARE

## 2022-09-15 PROBLEM — U07.1 COVID-19: Status: ACTIVE | Noted: 2022-09-15

## 2022-09-15 LAB
ANION GAP SERPL CALCULATED.3IONS-SCNC: 8 MMOL/L (ref 3–16)
BASOPHILS ABSOLUTE: 0.1 K/UL (ref 0–0.2)
BASOPHILS RELATIVE PERCENT: 1.1 %
BUN BLDV-MCNC: 13 MG/DL (ref 7–20)
CALCIUM SERPL-MCNC: 8.6 MG/DL (ref 8.3–10.6)
CHLORIDE BLD-SCNC: 100 MMOL/L (ref 99–110)
CO2: 31 MMOL/L (ref 21–32)
CREAT SERPL-MCNC: <0.5 MG/DL (ref 0.6–1.2)
EOSINOPHILS ABSOLUTE: 0.8 K/UL (ref 0–0.6)
EOSINOPHILS RELATIVE PERCENT: 6.3 %
GFR AFRICAN AMERICAN: >60
GFR NON-AFRICAN AMERICAN: >60
GLUCOSE BLD-MCNC: 117 MG/DL (ref 70–99)
GLUCOSE BLD-MCNC: 86 MG/DL (ref 70–99)
GLUCOSE BLD-MCNC: 87 MG/DL (ref 70–99)
GLUCOSE BLD-MCNC: 92 MG/DL (ref 70–99)
GLUCOSE BLD-MCNC: 99 MG/DL (ref 70–99)
HCT VFR BLD CALC: 31.7 % (ref 36–48)
HEMOGLOBIN: 9.8 G/DL (ref 12–16)
INR BLD: 1.21 (ref 0.87–1.14)
LYMPHOCYTES ABSOLUTE: 3.3 K/UL (ref 1–5.1)
LYMPHOCYTES RELATIVE PERCENT: 27.7 %
MCH RBC QN AUTO: 25.8 PG (ref 26–34)
MCHC RBC AUTO-ENTMCNC: 31.1 G/DL (ref 31–36)
MCV RBC AUTO: 82.9 FL (ref 80–100)
MONOCYTES ABSOLUTE: 0.7 K/UL (ref 0–1.3)
MONOCYTES RELATIVE PERCENT: 5.6 %
NEUTROPHILS ABSOLUTE: 7.1 K/UL (ref 1.7–7.7)
NEUTROPHILS RELATIVE PERCENT: 59.3 %
PDW BLD-RTO: 17 % (ref 12.4–15.4)
PERFORMED ON: ABNORMAL
PERFORMED ON: NORMAL
PLATELET # BLD: 320 K/UL (ref 135–450)
PMV BLD AUTO: 7.2 FL (ref 5–10.5)
POTASSIUM REFLEX MAGNESIUM: 4 MMOL/L (ref 3.5–5.1)
PROTHROMBIN TIME: 15.2 SEC (ref 11.7–14.5)
RBC # BLD: 3.82 M/UL (ref 4–5.2)
SODIUM BLD-SCNC: 139 MMOL/L (ref 136–145)
WBC # BLD: 12 K/UL (ref 4–11)

## 2022-09-15 PROCEDURE — 85610 PROTHROMBIN TIME: CPT

## 2022-09-15 PROCEDURE — 2060000000 HC ICU INTERMEDIATE R&B

## 2022-09-15 PROCEDURE — 94761 N-INVAS EAR/PLS OXIMETRY MLT: CPT

## 2022-09-15 PROCEDURE — 99233 SBSQ HOSP IP/OBS HIGH 50: CPT

## 2022-09-15 PROCEDURE — 99232 SBSQ HOSP IP/OBS MODERATE 35: CPT | Performed by: INTERNAL MEDICINE

## 2022-09-15 PROCEDURE — 94640 AIRWAY INHALATION TREATMENT: CPT

## 2022-09-15 PROCEDURE — 85025 COMPLETE CBC W/AUTO DIFF WBC: CPT

## 2022-09-15 PROCEDURE — 2580000003 HC RX 258: Performed by: INTERNAL MEDICINE

## 2022-09-15 PROCEDURE — 80048 BASIC METABOLIC PNL TOTAL CA: CPT

## 2022-09-15 PROCEDURE — 0WP9X0Z REMOVAL OF DRAINAGE DEVICE FROM RIGHT PLEURAL CAVITY, EXTERNAL APPROACH: ICD-10-PCS | Performed by: STUDENT IN AN ORGANIZED HEALTH CARE EDUCATION/TRAINING PROGRAM

## 2022-09-15 PROCEDURE — 77012 CT SCAN FOR NEEDLE BIOPSY: CPT

## 2022-09-15 PROCEDURE — 6370000000 HC RX 637 (ALT 250 FOR IP): Performed by: INTERNAL MEDICINE

## 2022-09-15 PROCEDURE — 6360000002 HC RX W HCPCS: Performed by: STUDENT IN AN ORGANIZED HEALTH CARE EDUCATION/TRAINING PROGRAM

## 2022-09-15 PROCEDURE — 0W9930Z DRAINAGE OF RIGHT PLEURAL CAVITY WITH DRAINAGE DEVICE, PERCUTANEOUS APPROACH: ICD-10-PCS | Performed by: STUDENT IN AN ORGANIZED HEALTH CARE EDUCATION/TRAINING PROGRAM

## 2022-09-15 PROCEDURE — 71045 X-RAY EXAM CHEST 1 VIEW: CPT

## 2022-09-15 PROCEDURE — 2700000000 HC OXYGEN THERAPY PER DAY

## 2022-09-15 PROCEDURE — 36415 COLL VENOUS BLD VENIPUNCTURE: CPT

## 2022-09-15 PROCEDURE — 6360000002 HC RX W HCPCS: Performed by: INTERNAL MEDICINE

## 2022-09-15 PROCEDURE — 6370000000 HC RX 637 (ALT 250 FOR IP)

## 2022-09-15 PROCEDURE — 2709999900 CT GUIDED CHEST TUBE

## 2022-09-15 RX ORDER — FENTANYL CITRATE 50 UG/ML
INJECTION, SOLUTION INTRAMUSCULAR; INTRAVENOUS
Status: COMPLETED | OUTPATIENT
Start: 2022-09-15 | End: 2022-09-15

## 2022-09-15 RX ORDER — ALBUTEROL SULFATE 90 UG/1
2 AEROSOL, METERED RESPIRATORY (INHALATION) 3 TIMES DAILY
Status: DISCONTINUED | OUTPATIENT
Start: 2022-09-15 | End: 2022-09-17

## 2022-09-15 RX ORDER — MIDAZOLAM HYDROCHLORIDE 1 MG/ML
INJECTION INTRAMUSCULAR; INTRAVENOUS
Status: COMPLETED | OUTPATIENT
Start: 2022-09-15 | End: 2022-09-15

## 2022-09-15 RX ADMIN — LEVOTHYROXINE SODIUM 125 MCG: 125 TABLET ORAL at 06:14

## 2022-09-15 RX ADMIN — PIRFENIDONE 3 TABLET: 267 TABLET, FILM COATED ORAL at 21:33

## 2022-09-15 RX ADMIN — FENTANYL CITRATE 25 MCG: 50 INJECTION INTRAMUSCULAR; INTRAVENOUS at 15:50

## 2022-09-15 RX ADMIN — Medication 2 PUFF: at 23:17

## 2022-09-15 RX ADMIN — Medication 2 PUFF: at 20:13

## 2022-09-15 RX ADMIN — SODIUM CHLORIDE, PRESERVATIVE FREE 10 ML: 5 INJECTION INTRAVENOUS at 09:15

## 2022-09-15 RX ADMIN — PANTOPRAZOLE SODIUM 40 MG: 40 TABLET, DELAYED RELEASE ORAL at 06:14

## 2022-09-15 RX ADMIN — MORPHINE SULFATE 4 MG: 4 INJECTION, SOLUTION INTRAMUSCULAR; INTRAVENOUS at 06:15

## 2022-09-15 RX ADMIN — MUPIROCIN: 20 OINTMENT TOPICAL at 21:35

## 2022-09-15 RX ADMIN — Medication 2 PUFF: at 08:15

## 2022-09-15 RX ADMIN — MIDAZOLAM 0.5 MG: 1 INJECTION INTRAMUSCULAR; INTRAVENOUS at 15:50

## 2022-09-15 RX ADMIN — FENTANYL CITRATE 25 MCG: 50 INJECTION INTRAMUSCULAR; INTRAVENOUS at 15:56

## 2022-09-15 RX ADMIN — SODIUM CHLORIDE, PRESERVATIVE FREE 10 ML: 5 INJECTION INTRAVENOUS at 21:33

## 2022-09-15 RX ADMIN — MORPHINE SULFATE 2 MG: 2 INJECTION, SOLUTION INTRAMUSCULAR; INTRAVENOUS at 19:42

## 2022-09-15 RX ADMIN — BENZONATATE 200 MG: 100 CAPSULE ORAL at 19:29

## 2022-09-15 ASSESSMENT — PAIN SCALES - GENERAL: PAINLEVEL_OUTOF10: 7

## 2022-09-15 ASSESSMENT — PAIN DESCRIPTION - PAIN TYPE: TYPE: ACUTE PAIN

## 2022-09-15 ASSESSMENT — PAIN DESCRIPTION - FREQUENCY: FREQUENCY: CONTINUOUS

## 2022-09-15 ASSESSMENT — PAIN DESCRIPTION - ORIENTATION: ORIENTATION: RIGHT;UPPER

## 2022-09-15 ASSESSMENT — PAIN DESCRIPTION - DESCRIPTORS: DESCRIPTORS: SHARP;STABBING

## 2022-09-15 ASSESSMENT — PAIN DESCRIPTION - LOCATION: LOCATION: CHEST;SHOULDER

## 2022-09-15 ASSESSMENT — PAIN - FUNCTIONAL ASSESSMENT: PAIN_FUNCTIONAL_ASSESSMENT: ACTIVITIES ARE NOT PREVENTED

## 2022-09-15 NOTE — OR NURSING
Time out completed prior to procedure. Pt was place supine on the CT table. Pt was placed on all cardiac monitoring. Pt placed on 6 L NC for sedation.    Pt currently on 3L NC per MD request.

## 2022-09-15 NOTE — PLAN OF CARE
Problem: Discharge Planning  Goal: Discharge to home or other facility with appropriate resources  Outcome: Progressing     Problem: Safety - Adult  Goal: Free from fall injury  Outcome: Progressing     Problem: Skin/Tissue Integrity  Goal: Absence of new skin breakdown  Description: 1. Monitor for areas of redness and/or skin breakdown  2. Assess vascular access sites hourly  3. Every 4-6 hours minimum:  Change oxygen saturation probe site  4. Every 4-6 hours:  If on nasal continuous positive airway pressure, respiratory therapy assess nares and determine need for appliance change or resting period.   Outcome: Progressing     Problem: Nutrition Deficit:  Goal: Optimize nutritional status  Outcome: Progressing     Problem: Chronic Conditions and Co-morbidities  Goal: Patient's chronic conditions and co-morbidity symptoms are monitored and maintained or improved  Outcome: Progressing     Problem: Pain  Goal: Verbalizes/displays adequate comfort level or baseline comfort level  Outcome: Progressing     Problem: ABCDS Injury Assessment  Goal: Absence of physical injury  Outcome: Progressing

## 2022-09-15 NOTE — PROGRESS NOTES
Patient requested pain medication. BP stable. Pain medication given. Chest tube remains secured and on continuous wall suction. No crepitus noted. Chest tube unclamped. Dressing clean, dry, and intact. No drainage overnight.

## 2022-09-15 NOTE — BRIEF OP NOTE
Brief Postoperative Note    Armin Obando  YOB: 1955  [id number]    Pre-operative Diagnosis: right pneumothorax    Post-operative Diagnosis: Same    Procedure: image guided placement of right chest tube, removal of prior right chest tube    Anesthesia: Local and Moderate Sedation    Surgeons/Assistants: Rock Santillan MD    Estimated Blood Loss: less than 5    Complications: None    Specimens: Was Not Obtained    Findings: successful placement of right 8F chest tube.     Electronically signed by Rock Santillan MD on 9/15/2022 at 4:01 PM

## 2022-09-15 NOTE — OR NURSING
Image guided chest tube insertion right pleural space completed. Dr. Tavia Case placed 8 [place] 25 cm Exodus Array. LOT # [id number] EXP [id number] in the right pleural space. Drain/tube secured at the 16 cm line with tegaderm. Drain/tube dressing clean, dry, and intact. Pt tolerated procedure without any signs or symptoms of distress. Vital signs stable. Report called to Floor RN. Pt transported back to PCU in stable condition via bed by transport.      Medications  Versed: 0.5 mg  Fentanyl: 50 mcg    Vital Signs  Vitals:    09/15/22 1605   BP: 122/76   Pulse: 89   Resp: 21   Temp:    SpO2: 98%    (vital signs in table format)    Post Liz  2 - Able to move 4 extremities voluntarily on command  2 - BP+/- 20mmHg of normal  2 - Fully awake  1 - Needs oxygen to maintain oxygen saturation >90%  2 - Able to breathe deeply and cough freely

## 2022-09-15 NOTE — PROGRESS NOTES
IM Progress Note    Admit Date:  9/9/2022    Interval history:    ILD with right pneumothorax, covid positive  S.p chest tube placement , back to home o2 of 4 L     Subjective:  Ms. Obando stable  Complains of pain at chest tube site. Some shortness of breath. Diarrhea resolved . Oxygen saturation stable, on 4 L  Looks weak, fatigued  Worsening pneumothorax today ;   Plan is new chest tube placement      Objective:   Patient Vitals for the past 4 hrs:   BP Temp Temp src Pulse Resp SpO2   09/15/22 1458 127/74 97.7 °F (36.5 °C) Oral 93 18 93 %            Intake/Output Summary (Last 24 hours) at 9/15/2022 1500  Last data filed at 9/15/2022 1217  Gross per 24 hour   Intake 310 ml   Output 528 ml   Net -218 ml         Physical Exam:    Gen: No distress. Alert. Awake, oriented . Thin , elderly female . Looks ill , fatigued   Eyes: PERRL. No sclera icterus. No conjunctival injection. ENT: No discharge. Pharynx clear. Neck: No JVD. Trachea midline. Resp: No accessory muscle use. Diminished breath sounds . No crackles. No wheezes. No rhonchi. R chest tube to suction   CV: Regular rate. Regular rhythm. No murmur. No rub. No edema. Capillary Refill: Brisk,< 3 seconds   Peripheral Pulses: +2 palpable, equal bilaterally   GI: Non-tender. Non-distended. Normal bowel sounds. Skin: Warm and dry. No nodule on exposed extremities. No rash on exposed extremities. M/S: No cyanosis. No joint deformity. No clubbing. Neuro: Awake. Grossly nonfocal    Psych: Oriented x 3. No anxiety or agitation.          Medications:  albuterol sulfate HFA, 2 puff, TID  benzonatate, 200 mg, TID  [Held by provider] enoxaparin, 40 mg, Daily  mupirocin, , BID  dexamethasone, 6 mg, Daily  acetaminophen, 1,000 mg, Once  atorvastatin, 40 mg, Daily  guaiFENesin, 1,200 mg, QAM  levothyroxine, 125 mcg, Daily  magnesium oxide, 400 mg, Daily  nadolol, 20 mg, Daily  pantoprazole, 40 mg, QAM AC  insulin lispro, 0-4 Units, TID WC  insulin pigtail catheter are within the chest wall. Otherwise stable appearance of chest with diffuse interstitial prominence. XR CHEST PORTABLE   Final Result   No pneumothorax on the right. Pleural drainage catheter seen on the right      Fibrotic changes in the lungs, similar to prior         XR CHEST PORTABLE   Final Result   1. Complete resolution of the previously noted right apical pneumothorax. XR CHEST PORTABLE   Final Result   1. Worsening small to moderate right apical pneumothorax. XR CHEST PORTABLE   Final Result   Diffuse bilateral honeycomb lung disease. No pneumothorax evident. Pigtail   partially in the right pleural space. XR CHEST PORTABLE   Final Result   Partial retraction of right chest tube. Distal side port may be extra   thoracic. There is perhaps minimal subcutaneous air. Small right apical pneumothorax, decreased in the interval.      Stable extensive interstitial pattern. XR CHEST PORTABLE   Final Result   Increased size of right-sided pneumothorax measuring 1.6 cm with stable   positioning of the right apically oriented pigtail thoracostomy tube. The findings were sent to the [facility] Results Communication Team at 15:57 on   9/10/2022 to be communicated to a licensed caregiver. XR CHEST PORTABLE   Final Result   Right chest tube is in normal position. No pneumothorax is evident. Interstitial opacities, correlating with extensive emphysematous disease. XR CHEST PORTABLE   Final Result   Interval placement of a locking loop chest tube at the right lung apex with   resolved pneumothorax. CT CHEST PULMONARY EMBOLISM W CONTRAST   Final Result   No evidence of pulmonary embolism      Right apical, anterior and lateral pneumothorax approximately 30%. Chest tube   extends along the right lateral chest wall however tip is extrapleural and   lateral to the upper ribcage      Coronary artery/atherosclerotic disease. Cardiomegaly      Extensive bullous changes and pulmonary fibrosis      Small right pleural effusion      RECOMMENDATIONS:   Re-evaluate for reinsertion of right chest tube         XR CHEST PORTABLE   Final Result   Nearly completely resolved right pneumothorax. Chest tube along the right   lateral hemithorax however tip appears extrapleural.         XR CHEST PORTABLE   Final Result   1. Moderate right-sided pneumothorax with approximately 4 cm of pleural   separation at the lung apex. No definite evidence of tension morphology at   this time. 2.  Chronic interstitial lung disease         XR CHEST PORTABLE    (Results Pending)   CT GUIDED CHEST TUBE    (Results Pending)         Assessment/Plan:  Acute on chronic respiratory failure  Hx of ILD on 3 L at home  Covid 19 infection   Right pneumothorax  -seen by pulmonology  -pt with hx of Pulmonary fibrosis  and Hx of cryptogenic organizing pneumonia with chronic resp failure with recurrent admissions  - imaging with right sided pneumothorax likely induced by cough   -> s.p chest tube placement and resolved on repeat imaging--> continued chest tube to suction  - doubt hypoxia due to covid , as it improved since chest tube is placed and now back on home level  - can wean steroids  - resumed Pirfenidone   -Continue Decadron 6 mg daily  -Was requiring O2 up to 5 L , oxygen saturation now stable at baseline O2 4 L  - 9/15 worsening pneumothorax today; chest tube not in position. Will be replaced with new chest tube today.   Pulmonology contacted cardiothoracic surgery for possible transfer to [address] [place] [address] diastolic CHF  last echo as above, from 5/6/22 EF 55%  -BNP mildly elevated  - pt was taken of diuretics recently for weight loss   - monitor for fluid overload and resume lasix as needed only      S.p recent L1 burst fracture   -  s/p T11-L3 PDFF with L1 corpectomy at  ( 8/22)     DM type 2  -Has been taken off diuretics and diabetic meds for progressive weight loss   - hyperglycemia with steroids  -SSI  -continue to monitor BG     HTN  -BP stable  -on nadolol     HLD  -continue statin     Hypothyroidism  -continue synthroid     Iron Deficiency Anemia  - Recent Hemorrhoidal bleed -recent colonoscopy neg  - hb at 8 .4  -continue PO iron  -Hgb stable     Depression  Anxiety  -continue zoloft  -prn ativan    Diarrhea  -Imodium as needed  Cut down intake of tomato soup    DVT Prophylaxis: Lovenox, being held   Diet: Diet NPO  Code Status: Full Code    Patient to go down to radiology now for new chest tube placement.    May need to transfer to [facility] for CT surgery eval Jed Spatz, MD    9/15/2022

## 2022-09-15 NOTE — PROGRESS NOTES
Pt with complaints of sharp stabbing pain directly above insertion site, with slight SOB. SPO2 95% on 3L. Appears to have small airleak present due to intermittent bubbling in chamber. All connections checked, as well as dressing around tube at insertion site. MD notified.

## 2022-09-15 NOTE — PROGRESS NOTES
[facility]. Call returned, Spoke to Dr Rajesh Taylor who was notified that at this time we are unable to obtain a pneumostat valve. Central supply has been in contact with [employer] and they will be providing pt with needed supplies. Unsure of when items will arrive from [place]. Also okayed for pt to resume diet at this time.

## 2022-09-15 NOTE — PROGRESS NOTES
Shift assessment complete, see flowsheet. Pt A&O x4. Denies feelings of SOB. Crackles noted. Pt refused all scheduled morning medications this AM. PT states with receiving the news she'll be receiving a new chest tube today she isn't feeling up to taking her morning meds at this time. No further needs expressed by pt. Call light left within reach.

## 2022-09-15 NOTE — PROGRESS NOTES
RT Inhaler-Nebulizer Bronchodilator Protocol Note    There is a bronchodilator order in the chart from a provider indicating to follow the RT Bronchodilator Protocol and there is an Initiate RT Inhaler-Nebulizer Bronchodilator Protocol order as well (see protocol at bottom of note). CXR Findings:  XR CHEST PORTABLE    Result Date: 9/14/2022  No change position of the right chest tube with a new small right apical pneumothorax and new subcutaneous air along the right chest wall. Some of the holes in the pigtail catheter are within the chest wall. Otherwise stable appearance of chest with diffuse interstitial prominence. XR CHEST PORTABLE    Result Date: 9/14/2022  No pneumothorax on the right. Pleural drainage catheter seen on the right Fibrotic changes in the lungs, similar to prior     XR CHEST PORTABLE    Result Date: 9/13/2022  1. Complete resolution of the previously noted right apical pneumothorax. The findings from the last RT Protocol Assessment were as follows:   History Pulmonary Disease: Chronic pulmonary disease  Respiratory Pattern: Dyspnea on exertion or RR 21-25 bpm  Breath Sounds: Slightly diminished and/or crackles  Cough: Strong, spontaneous, non-productive  Indication for Bronchodilator Therapy: On home bronchodilators  Bronchodilator Assessment Score: 6    Aerosolized bronchodilator medication orders have been revised according to the RT Inhaler-Nebulizer Bronchodilator Protocol below. Respiratory Therapist to perform RT Therapy Protocol Assessment initially then follow the protocol. Repeat RT Therapy Protocol Assessment PRN for score 0-3 or on second treatment, BID, and PRN for scores above 3. No Indications - adjust the frequency to every 6 hours PRN wheezing or bronchospasm, if no treatments needed after 48 hours then discontinue using Per Protocol order mode.      If indication present, adjust the RT bronchodilator orders based on the Bronchodilator Assessment Score as indicated below. Use Inhaler orders unless patient has one or more of the following: on home nebulizer, not able to hold breath for 10 seconds, is not alert and oriented, cannot activate and use MDI correctly, or respiratory rate 25 breaths per minute or more, then use the equivalent nebulizer order(s) with same Frequency and PRN reasons based on the score. If a patient is on this medication at home then do not decrease Frequency below that used at home. 0-3 - enter or revise RT bronchodilator order(s) to equivalent RT Bronchodilator order with Frequency of every 4 hours PRN for wheezing or increased work of breathing using Per Protocol order mode. 4-6 - enter or revise RT Bronchodilator order(s) to two equivalent RT bronchodilator orders with one order with BID Frequency and one order with Frequency of every 4 hours PRN wheezing or increased work of breathing using Per Protocol order mode. 7-10 - enter or revise RT Bronchodilator order(s) to two equivalent RT bronchodilator orders with one order with TID Frequency and one order with Frequency of every 4 hours PRN wheezing or increased work of breathing using Per Protocol order mode. 11-13 - enter or revise RT Bronchodilator order(s) to one equivalent RT bronchodilator order with QID Frequency and an Albuterol order with Frequency of every 4 hours PRN wheezing or increased work of breathing using Per Protocol order mode. Greater than 13 - enter or revise RT Bronchodilator order(s) to one equivalent RT bronchodilator order with every 4 hours Frequency and an Albuterol order with Frequency of every 2 hours PRN wheezing or increased work of breathing using Per Protocol order mode. RT to enter RT Home Evaluation for COPD & MDI Assessment order using Per Protocol order mode.     Electronically signed by Jigna Champagne on 9/15/2022 at 8:19 AM

## 2022-09-15 NOTE — FLOWSHEET NOTE
09/15/22 1458   Vital Signs   Temp 97.7 °F (36.5 °C)   Temp Source Oral   Heart Rate 93   Heart Rate Source Monitor   Resp 18   /74   BP Location Left upper arm   BP Method Automatic   MAP (Calculated) 91.67   Patient Position High fowlers   Level of Consciousness 0   MEWS Score 1   Oxygen Therapy   SpO2 93 %   O2 Device Nasal cannula   O2 Flow Rate (L/min) 5 L/min       Pt to be transported to radiology at this time for new chest tube placement. RN to escort transport to radiology with pt.

## 2022-09-15 NOTE — PRE SEDATION
insulin lispro  0-4 Units SubCUTAneous Nightly    sodium chloride flush  5-40 mL IntraVENous 2 times per day    sertraline  200 mg Oral Daily    Pirfenidone  3 tablet Oral TID     Continuous Infusions:    dextrose      sodium chloride       PRN Meds: loperamide, guaiFENesin-dextromethorphan, albuterol sulfate HFA, hydrOXYzine HCl, glucose, dextrose bolus **OR** dextrose bolus, glucagon (rDNA), dextrose, sodium chloride flush, sodium chloride, ondansetron **OR** ondansetron, polyethylene glycol, acetaminophen **OR** acetaminophen, morphine, morphine, mirtazapine, methocarbamol  Home Meds:   Prior to Admission medications    Medication Sig Start Date End Date Taking?  Authorizing Provider   magnesium oxide (MAG-OX) 400 (240 Mg) MG tablet Take 1 tablet by mouth daily 9/2/22   Bolivar Camejo MD   methocarbamol (ROBAXIN) 750 MG tablet Take 1 tablet by mouth 3 times daily 9/1/22 10/1/22  Bolivar Camejo MD   potassium chloride (KLOR-CON) 10 MEQ extended release tablet Take 1 tablet by mouth 2 times daily 9/1/22   Bolivar Camejo MD   polycarbophil [facility]) 625 MG tablet Take 1 tablet by mouth at bedtime 9/1/22   Bolivar Camejo MD   Ergocalciferol (VITAMIN D) 41466 units CAPS Take 50,000 Units by mouth once a week 9/4/22   Bolivar Camejo MD   Pirfenidone 267 MG TABS Take 3 tablets by mouth in the morning, at noon, and at bedtime 8/26/22   Celi Cancino MD   albuterol sulfate HFA (PROVENTIL;VENTOLIN;PROAIR) 108 (90 Base) MCG/ACT inhaler INHALE 2 PUFFS INTO THE LUNGS EVERY 6 HOURS AS NEEDED FOR WHEEZING 7/13/22   Ani Mcclure MD   atorvastatin (LIPITOR) 40 MG tablet Take 1 tablet by mouth daily 5/27/22   Santiago Downs MD   levothyroxine (SYNTHROID) 125 MCG tablet Take 1 tablet by mouth Daily TAKE 1 TABLET BY MOUTH EVERY DAY 5/27/22   Santiago Downs MD   sertraline (ZOLOFT) 100 MG tablet Take 2 tablets by mouth daily 5/27/22   Santiago Downs MD   mirtazapine (REMERON) 30 MG tablet Take 1 tablet by mouth nightly 5/27/22   Rivas Leyva MD   nadolol (CORGARD) 20 MG tablet Take 1 tablet by mouth daily 5/27/22   Rivas Leyva MD   pantoprazole (PROTONIX) 40 MG tablet Take 1 tablet by mouth every morning (before breakfast) 5/27/22   Rivas Leyva MD   loratadine-pseudoephedrine ([employer] ALLERGY RELIEF-D12) 5-120 MG per extended release tablet TAKE 1 TABLET BY MOUTH TWICE A DAY 5/27/22   Rivas Leyva MD   benzonatate (TESSALON) 200 MG capsule TAKE 1 CAPSULE BY MOUTH 3 TIMES A DAY AS NEEDED FOR COUGH 5/27/22   Rivas Leyva MD   empagliflozin (JARDIANCE) 25 MG tablet Take 1 tablet by mouth daily 5/27/22 9/1/22  Rivas Leyva MD   SITagliptin (JANUVIA) 100 MG tablet TAKE 1 TABLET BY MOUTH EVERY DAY 5/27/22 9/1/22  Rivas Leyva MD   hydrOXYzine (ATARAX) 10 MG tablet Take 1 tablet by mouth every 8 hours as needed for Itching TAKE 1 TO 3 TABLETS BY MOUTH 3 TIMES DAILY AS NEEDED FOR ITCHING 5/8/22   PNADA Latham CNP   furosemide (LASIX) 40 MG tablet Take 1 tablet by mouth daily 5/9/22 9/1/22  PANDA Latham CNP   acetaminophen (TYLENOL) 325 MG tablet  4/21/22   Historical Provider, MD   guaiFENesin (MUCINEX) 600 MG extended release tablet Take 1,200 mg by mouth every morning    Historical Provider, MD   albuterol (PROVENTIL) (2.5 MG/3ML) 0.083% nebulizer solution INHALE THE CONTENTS OF 1 VIAL VIA NEBULIZER EVERY 6 HOURS AS NEEDED FOR WHEEZING 2/7/22   Rivas Leyva MD   Blood Glucose Monitoring Suppl (TRUE METRIX METER) PUJA Test daily and as needed 9/10/21   Rivas Leyva MD   blood glucose test strips (ASCENSIA AUTODISC VI;ONE TOUCH ULTRA TEST VI) strip 1 each by In Vitro route daily As needed.  9/10/21   Rivas Leyva MD   TRUEplus Lancets 33G MISC Use daily 9/10/21   Rivas Leyva MD   Blood Glucose Calibration (TRUE METRIX LEVEL 1) Low SOLN use as needed 9/9/21   Rivas Leyva MD   INSULIN SYRINGE 1CC/29G 29G X 1/2\" 1 ML MISC 1 each by Does not apply route 2 times daily

## 2022-09-15 NOTE — PROGRESS NOTES
RT Inhaler-Nebulizer Bronchodilator Protocol Note    There is a bronchodilator order in the chart from a provider indicating to follow the RT Bronchodilator Protocol and there is an Initiate RT Inhaler-Nebulizer Bronchodilator Protocol order as well (see protocol at bottom of note). CXR Findings:  XR CHEST PORTABLE    Result Date: 9/14/2022  No change position of the right chest tube with a new small right apical pneumothorax and new subcutaneous air along the right chest wall. Some of the holes in the pigtail catheter are within the chest wall. Otherwise stable appearance of chest with diffuse interstitial prominence. XR CHEST PORTABLE    Result Date: 9/14/2022  No pneumothorax on the right. Pleural drainage catheter seen on the right Fibrotic changes in the lungs, similar to prior     XR CHEST PORTABLE    Result Date: 9/13/2022  1. Complete resolution of the previously noted right apical pneumothorax. XR CHEST PORTABLE    Result Date: 9/13/2022  1. Worsening small to moderate right apical pneumothorax. The findings from the last RT Protocol Assessment were as follows:   History Pulmonary Disease: Chronic pulmonary disease  Respiratory Pattern: Dyspnea on exertion or RR 21-25 bpm  Breath Sounds: Slightly diminished and/or crackles  Cough: Strong, spontaneous, non-productive  Indication for Bronchodilator Therapy: On home bronchodilators  Bronchodilator Assessment Score: 6    Aerosolized bronchodilator medication orders have been revised according to the RT Inhaler-Nebulizer Bronchodilator Protocol below. Respiratory Therapist to perform RT Therapy Protocol Assessment initially then follow the protocol. Repeat RT Therapy Protocol Assessment PRN for score 0-3 or on second treatment, BID, and PRN for scores above 3. No Indications - adjust the frequency to every 6 hours PRN wheezing or bronchospasm, if no treatments needed after 48 hours then discontinue using Per Protocol order mode. If indication present, adjust the RT bronchodilator orders based on the Bronchodilator Assessment Score as indicated below. Use Inhaler orders unless patient has one or more of the following: on home nebulizer, not able to hold breath for 10 seconds, is not alert and oriented, cannot activate and use MDI correctly, or respiratory rate 25 breaths per minute or more, then use the equivalent nebulizer order(s) with same Frequency and PRN reasons based on the score. If a patient is on this medication at home then do not decrease Frequency below that used at home. 0-3 - enter or revise RT bronchodilator order(s) to equivalent RT Bronchodilator order with Frequency of every 4 hours PRN for wheezing or increased work of breathing using Per Protocol order mode. 4-6 - enter or revise RT Bronchodilator order(s) to two equivalent RT bronchodilator orders with one order with BID Frequency and one order with Frequency of every 4 hours PRN wheezing or increased work of breathing using Per Protocol order mode. 7-10 - enter or revise RT Bronchodilator order(s) to two equivalent RT bronchodilator orders with one order with TID Frequency and one order with Frequency of every 4 hours PRN wheezing or increased work of breathing using Per Protocol order mode. 11-13 - enter or revise RT Bronchodilator order(s) to one equivalent RT bronchodilator order with QID Frequency and an Albuterol order with Frequency of every 4 hours PRN wheezing or increased work of breathing using Per Protocol order mode. Greater than 13 - enter or revise RT Bronchodilator order(s) to one equivalent RT bronchodilator order with every 4 hours Frequency and an Albuterol order with Frequency of every 2 hours PRN wheezing or increased work of breathing using Per Protocol order mode.        Electronically signed by Triny Newman RCP on 9/14/2022 at 8:09 PM

## 2022-09-15 NOTE — PROGRESS NOTES
Shift assessment completed. See flow sheet. Medications given. Vitals are stable. Patient is A&O x4. Chest tube in place. Dressing clean, dry, and intact. Chest tube has no drainage and is hooked to continuous suction and unclamped. No crepitus noted.

## 2022-09-15 NOTE — FLOWSHEET NOTE
09/15/22 1942   Pain Assessment   Pain Assessment 0-10   Pain Level 7   Patient's Stated Pain Goal 2   Pain Location Chest;Shoulder   Pain Orientation Right;Upper   Pain Descriptors Sharp; Stabbing   Functional Pain Assessment Activities are not prevented   Pain Type Acute pain   Pain Frequency Continuous   Non-Pharmaceutical Pain Intervention(s) Repositioned       PRN morphine 2mg given at this time for pain.

## 2022-09-15 NOTE — PROGRESS NOTES
Pulmonary & Critical Care Medicine Progress Note  Hospital Day: 7   CC: COVID, pneumothorax    Subjective: Worsening pneumothorax on AM CXR. No air leak. Pt is asymptomatic. IV: peripheral    PHYSICAL EXAM:  Blood pressure 119/79, pulse 84, temperature 97.4 °F (36.3 °C), temperature source Oral, resp. rate 20, height 5' 3\" (1.6 m), weight 115 lb 3 oz (52.2 kg), SpO2 96 %, not currently breastfeeding. on 4 L   General: ill appearing    Eyes: PERRL. No sclera icterus. No conjunctival injection. ENT: No discharge. Pharynx clear. Neck: Trachea midline. Normal thyroid. Resp: No accessory muscle use. + crackles. No wheezing. No rhonchi. No dullness on percussion. Chest tube on R with no air leak, slightly more retracted again today. CV: Regular rate. Regular rhythm. No mumur or rub. No edema. Peripheral pulses are 2+. Capillary refill is less than 3 seconds. GI: Non-tender. Non-distended. No masses. No organomegaly. Normal bowel sounds. No hernia. Skin: Warm and dry. No nodule on exposed extremities. No rash on exposed extremities. Lymph: No cervical LAD. No supraclavicular LAD. M/S: No cyanosis. No joint deformity. No clubbing. Neuro: A&OX3.  Patellar reflexes are symmetric  Psych: No agitation, no anxiety, affect is full     Scheduled Meds:   benzonatate  200 mg Oral TID    enoxaparin  40 mg SubCUTAneous Daily    mupirocin   Nasal BID    dexamethasone  6 mg Oral Daily    albuterol sulfate HFA  2 puff Inhalation 4x daily    acetaminophen  1,000 mg Oral Once    atorvastatin  40 mg Oral Daily    guaiFENesin  1,200 mg Oral QAM    levothyroxine  125 mcg Oral Daily    magnesium oxide  400 mg Oral Daily    nadolol  20 mg Oral Daily    pantoprazole  40 mg Oral QAM AC    insulin lispro  0-4 Units SubCUTAneous TID WC    insulin lispro  0-4 Units SubCUTAneous Nightly    sodium chloride flush  5-40 mL IntraVENous 2 times per day    sertraline  200 mg Oral Daily    Pirfenidone  3 tablet Oral TID     Data:  CBC: Recent Labs     09/14/22  0440 09/15/22  0431   WBC 12.0* 12.0*   HGB 9.9* 9.8*   HCT 30.6* 31.7*   MCV 83.1 82.9    320     BMP:   Recent Labs     09/13/22  0427 09/14/22  0440 09/15/22  0431    138 139   K 4.0 3.9 4.0   CL 98* 98* 100   CO2 35* 35* 31   BUN 12 14 13   CREATININE <0.5* <0.5* <0.5*     LIVER PROFILE:   Recent Labs     09/13/22 0427   AST 29   ALT 12   BILITOT 0.3   ALKPHOS 131*     Microbiology:  9/9/2022 SARS-CoV-2 POSITIVE  and influenza negative  9/9/2022 blood NGTD    Imaging:  CTPA 9/9/2022:  bullous emphysema, pulmonary fibrosis, right-sided pneumothorax    pCXR 9/11/2022 AM:  small right apical pneumothorax, possible retraction of chest tube    pCXR 9/13/2022 AM:  found tube had been clamped   Worsening small to moderate right apical pneumothorax. pCXR 9/13/2022: After unclamping  Complete resolution of the previously noted right apical pneumothorax. pCXR 9/14/2022 AM:  No pneumothorax on the right. Pleural drainage catheter seen on the right. Fibrotic changes in the lungs, similar to prior     pCXR 9/14/2022:  with tube clamped 4 hrs with small apical PTX     pCXR 9/14/2022 AM:  my read--worsened R pneumo    ASSESSMENT:  Acute on chronic hypoxemic respiratory failure, on 3 L home oxygen  COVID-19 pneumonia  Secondary right-sided spontaneous pneumothorax  Chest tube placed 9/9/22  ILD, h/o  & NSIP v chronic HP now with acute exacerbation   Severe bullous changes/paraseptal emphysema - no tobacco history   Chronic diastolic CHF  R84-U6 PDFF withL1 corpectomy @  8/22/22 for L1 burst fracture      PLAN:  - COVID-19 isolation, droplet plus  -  Supplemental oxygen to maintain SaO2 >92%; wean as tolerated   Decadron D#7, 6 mg daily   Home pirfenidone, monitoring for toxicity   Inhaled bronchodilators   Chest tube to -20 suction, daily CXR   OOB with assistance. PT/OT  Chest tube has retracted into subcutaneous tissue and right pneumothorax has worsened.   Discussed with interventional radiology and appreciate plan for CT-guided replacement of chest tube today. NPO. Hold lovenox. D/W Dr. Froilan Bennett. Requested nursing obtain pneumostat. I spent a total of 37 minutes on this encounter on chart review, history taking review of data & coordination of care.   Roge Ramos PA-C on 9/15/22 at 8:23 AM EDT

## 2022-09-15 NOTE — PROGRESS NOTES
Chest tube unclamped 14:15 after timed CXR demonstrated small new apical pneumo. Air leak initially, then stopped.

## 2022-09-15 NOTE — PROGRESS NOTES
Bedside report and transfer of care given to [facility]. Pt currently resting in bed with the call light within reach. Pt denies any other care needs at this time. Pt stable at this time.

## 2022-09-15 NOTE — PROGRESS NOTES
Pt arrived back to PCU via bed by transport and radiology RN in stable condition at this time. Pt awake and alert. Requesting something to eat. Extra linens removed from under pt and repositioned at this time. Prior insertion site dressing remains clean, dry, and intact. No sings of drainage noted. New chest tube site also appears clean, dry, and intact. Tubing reinforced with tape at this time to avoid tugging and remove as much tension on site as possible.

## 2022-09-16 ENCOUNTER — APPOINTMENT (OUTPATIENT)
Dept: GENERAL RADIOLOGY | Age: 67
DRG: 177 | End: 2022-09-16
Payer: MEDICARE

## 2022-09-16 LAB
ANION GAP SERPL CALCULATED.3IONS-SCNC: 7 MMOL/L (ref 3–16)
BASOPHILS ABSOLUTE: 0.1 K/UL (ref 0–0.2)
BASOPHILS RELATIVE PERCENT: 0.5 %
BUN BLDV-MCNC: 14 MG/DL (ref 7–20)
CALCIUM SERPL-MCNC: 8.6 MG/DL (ref 8.3–10.6)
CHLORIDE BLD-SCNC: 99 MMOL/L (ref 99–110)
CO2: 31 MMOL/L (ref 21–32)
CREAT SERPL-MCNC: <0.5 MG/DL (ref 0.6–1.2)
EOSINOPHILS ABSOLUTE: 0.9 K/UL (ref 0–0.6)
EOSINOPHILS RELATIVE PERCENT: 7.8 %
GFR AFRICAN AMERICAN: >60
GFR NON-AFRICAN AMERICAN: >60
GLUCOSE BLD-MCNC: 103 MG/DL (ref 70–99)
GLUCOSE BLD-MCNC: 115 MG/DL (ref 70–99)
GLUCOSE BLD-MCNC: 129 MG/DL (ref 70–99)
GLUCOSE BLD-MCNC: 138 MG/DL (ref 70–99)
GLUCOSE BLD-MCNC: 98 MG/DL (ref 70–99)
HCT VFR BLD CALC: 31 % (ref 36–48)
HEMOGLOBIN: 9.9 G/DL (ref 12–16)
LYMPHOCYTES ABSOLUTE: 2.5 K/UL (ref 1–5.1)
LYMPHOCYTES RELATIVE PERCENT: 21.9 %
MCH RBC QN AUTO: 26 PG (ref 26–34)
MCHC RBC AUTO-ENTMCNC: 31.8 G/DL (ref 31–36)
MCV RBC AUTO: 82 FL (ref 80–100)
MONOCYTES ABSOLUTE: 0.7 K/UL (ref 0–1.3)
MONOCYTES RELATIVE PERCENT: 6 %
NEUTROPHILS ABSOLUTE: 7.4 K/UL (ref 1.7–7.7)
NEUTROPHILS RELATIVE PERCENT: 63.8 %
PDW BLD-RTO: 16.3 % (ref 12.4–15.4)
PERFORMED ON: ABNORMAL
PERFORMED ON: NORMAL
PLATELET # BLD: 314 K/UL (ref 135–450)
PMV BLD AUTO: 7.1 FL (ref 5–10.5)
POTASSIUM REFLEX MAGNESIUM: 3.8 MMOL/L (ref 3.5–5.1)
RBC # BLD: 3.78 M/UL (ref 4–5.2)
SODIUM BLD-SCNC: 137 MMOL/L (ref 136–145)
WBC # BLD: 11.6 K/UL (ref 4–11)

## 2022-09-16 PROCEDURE — 99232 SBSQ HOSP IP/OBS MODERATE 35: CPT | Performed by: INTERNAL MEDICINE

## 2022-09-16 PROCEDURE — 6370000000 HC RX 637 (ALT 250 FOR IP)

## 2022-09-16 PROCEDURE — 36415 COLL VENOUS BLD VENIPUNCTURE: CPT

## 2022-09-16 PROCEDURE — 97161 PT EVAL LOW COMPLEX 20 MIN: CPT

## 2022-09-16 PROCEDURE — 94761 N-INVAS EAR/PLS OXIMETRY MLT: CPT

## 2022-09-16 PROCEDURE — 80048 BASIC METABOLIC PNL TOTAL CA: CPT

## 2022-09-16 PROCEDURE — 6370000000 HC RX 637 (ALT 250 FOR IP): Performed by: INTERNAL MEDICINE

## 2022-09-16 PROCEDURE — 94640 AIRWAY INHALATION TREATMENT: CPT

## 2022-09-16 PROCEDURE — 2700000000 HC OXYGEN THERAPY PER DAY

## 2022-09-16 PROCEDURE — 6360000002 HC RX W HCPCS: Performed by: INTERNAL MEDICINE

## 2022-09-16 PROCEDURE — 99233 SBSQ HOSP IP/OBS HIGH 50: CPT | Performed by: INTERNAL MEDICINE

## 2022-09-16 PROCEDURE — 71045 X-RAY EXAM CHEST 1 VIEW: CPT

## 2022-09-16 PROCEDURE — 97110 THERAPEUTIC EXERCISES: CPT

## 2022-09-16 PROCEDURE — 97530 THERAPEUTIC ACTIVITIES: CPT

## 2022-09-16 PROCEDURE — 85025 COMPLETE CBC W/AUTO DIFF WBC: CPT

## 2022-09-16 PROCEDURE — 97165 OT EVAL LOW COMPLEX 30 MIN: CPT

## 2022-09-16 PROCEDURE — 2060000000 HC ICU INTERMEDIATE R&B

## 2022-09-16 PROCEDURE — 2580000003 HC RX 258: Performed by: INTERNAL MEDICINE

## 2022-09-16 RX ORDER — BENZONATATE 100 MG/1
100 CAPSULE ORAL 3 TIMES DAILY PRN
Status: DISCONTINUED | OUTPATIENT
Start: 2022-09-16 | End: 2022-09-23 | Stop reason: HOSPADM

## 2022-09-16 RX ORDER — ENOXAPARIN SODIUM 100 MG/ML
30 INJECTION SUBCUTANEOUS DAILY
Status: DISCONTINUED | OUTPATIENT
Start: 2022-09-16 | End: 2022-09-20

## 2022-09-16 RX ORDER — ENOXAPARIN SODIUM 100 MG/ML
40 INJECTION SUBCUTANEOUS DAILY
Status: DISCONTINUED | OUTPATIENT
Start: 2022-09-16 | End: 2022-09-16 | Stop reason: DRUGHIGH

## 2022-09-16 RX ORDER — BENZONATATE 100 MG/1
100 CAPSULE ORAL 3 TIMES DAILY
Status: DISCONTINUED | OUTPATIENT
Start: 2022-09-16 | End: 2022-09-23 | Stop reason: HOSPADM

## 2022-09-16 RX ADMIN — Medication 2 PUFF: at 08:32

## 2022-09-16 RX ADMIN — PANTOPRAZOLE SODIUM 40 MG: 40 TABLET, DELAYED RELEASE ORAL at 05:50

## 2022-09-16 RX ADMIN — MORPHINE SULFATE 4 MG: 4 INJECTION, SOLUTION INTRAMUSCULAR; INTRAVENOUS at 17:52

## 2022-09-16 RX ADMIN — SODIUM CHLORIDE, PRESERVATIVE FREE 10 ML: 5 INJECTION INTRAVENOUS at 08:37

## 2022-09-16 RX ADMIN — BENZONATATE 100 MG: 100 CAPSULE ORAL at 21:34

## 2022-09-16 RX ADMIN — BENZONATATE 200 MG: 100 CAPSULE ORAL at 08:37

## 2022-09-16 RX ADMIN — MORPHINE SULFATE 2 MG: 2 INJECTION, SOLUTION INTRAMUSCULAR; INTRAVENOUS at 01:00

## 2022-09-16 RX ADMIN — GUAIFENESIN 1200 MG: 600 TABLET, EXTENDED RELEASE ORAL at 08:37

## 2022-09-16 RX ADMIN — GUAIFENESIN AND DEXTROMETHORPHAN 5 ML: 100; 10 SYRUP ORAL at 04:08

## 2022-09-16 RX ADMIN — Medication 2 PUFF: at 20:03

## 2022-09-16 RX ADMIN — LEVOTHYROXINE SODIUM 125 MCG: 125 TABLET ORAL at 05:50

## 2022-09-16 RX ADMIN — Medication 2 PUFF: at 11:33

## 2022-09-16 ASSESSMENT — PAIN DESCRIPTION - ORIENTATION
ORIENTATION: RIGHT
ORIENTATION: RIGHT

## 2022-09-16 ASSESSMENT — PAIN DESCRIPTION - LOCATION
LOCATION: CHEST
LOCATION: CHEST;SHOULDER

## 2022-09-16 ASSESSMENT — PAIN DESCRIPTION - DESCRIPTORS
DESCRIPTORS: PRESSURE
DESCRIPTORS: STABBING;SHARP;TENDER

## 2022-09-16 ASSESSMENT — PAIN DESCRIPTION - PAIN TYPE: TYPE: ACUTE PAIN

## 2022-09-16 ASSESSMENT — PAIN SCALES - GENERAL
PAINLEVEL_OUTOF10: 7
PAINLEVEL_OUTOF10: 6

## 2022-09-16 ASSESSMENT — PAIN DESCRIPTION - FREQUENCY: FREQUENCY: CONTINUOUS

## 2022-09-16 NOTE — PROGRESS NOTES
RT Inhaler-Nebulizer Bronchodilator Protocol Note    There is a bronchodilator order in the chart from a provider indicating to follow the RT Bronchodilator Protocol and there is an Initiate RT Inhaler-Nebulizer Bronchodilator Protocol order as well (see protocol at bottom of note). CXR Findings:  XR CHEST PORTABLE    Result Date: 9/15/2022  Slight interval retraction of right pleural catheter with increasing volume of right pneumothorax. Replacement or repositioning of the pleural catheter is advised. Findings were called at 8:09 a.m. .  At 8:24 a.m., findings were discussed with Duy Peters RN, on 9/15/2022. XR CHEST PORTABLE    Result Date: 9/14/2022  No change position of the right chest tube with a new small right apical pneumothorax and new subcutaneous air along the right chest wall. Some of the holes in the pigtail catheter are within the chest wall. Otherwise stable appearance of chest with diffuse interstitial prominence. The findings from the last RT Protocol Assessment were as follows:   History Pulmonary Disease: Chronic pulmonary disease  Respiratory Pattern: Dyspnea on exertion or RR 21-25 bpm  Breath Sounds: Slightly diminished and/or crackles  Cough: Strong, spontaneous, non-productive  Indication for Bronchodilator Therapy: Decreased or absent breath sounds  Bronchodilator Assessment Score: 6    Aerosolized bronchodilator medication orders have been revised according to the RT Inhaler-Nebulizer Bronchodilator Protocol below. Respiratory Therapist to perform RT Therapy Protocol Assessment initially then follow the protocol. Repeat RT Therapy Protocol Assessment PRN for score 0-3 or on second treatment, BID, and PRN for scores above 3. No Indications - adjust the frequency to every 6 hours PRN wheezing or bronchospasm, if no treatments needed after 48 hours then discontinue using Per Protocol order mode.      If indication present, adjust the RT bronchodilator orders based on the Bronchodilator Assessment Score as indicated below. Use Inhaler orders unless patient has one or more of the following: on home nebulizer, not able to hold breath for 10 seconds, is not alert and oriented, cannot activate and use MDI correctly, or respiratory rate 25 breaths per minute or more, then use the equivalent nebulizer order(s) with same Frequency and PRN reasons based on the score. If a patient is on this medication at home then do not decrease Frequency below that used at home. 0-3 - enter or revise RT bronchodilator order(s) to equivalent RT Bronchodilator order with Frequency of every 4 hours PRN for wheezing or increased work of breathing using Per Protocol order mode. 4-6 - enter or revise RT Bronchodilator order(s) to two equivalent RT bronchodilator orders with one order with BID Frequency and one order with Frequency of every 4 hours PRN wheezing or increased work of breathing using Per Protocol order mode. 7-10 - enter or revise RT Bronchodilator order(s) to two equivalent RT bronchodilator orders with one order with TID Frequency and one order with Frequency of every 4 hours PRN wheezing or increased work of breathing using Per Protocol order mode. 11-13 - enter or revise RT Bronchodilator order(s) to one equivalent RT bronchodilator order with QID Frequency and an Albuterol order with Frequency of every 4 hours PRN wheezing or increased work of breathing using Per Protocol order mode. Greater than 13 - enter or revise RT Bronchodilator order(s) to one equivalent RT bronchodilator order with every 4 hours Frequency and an Albuterol order with Frequency of every 2 hours PRN wheezing or increased work of breathing using Per Protocol order mode. RT to enter RT Home Evaluation for COPD & MDI Assessment order using Per Protocol order mode.     Electronically signed by Lenore Frankel, RCP on 9/16/2022 at 8:33 AM

## 2022-09-16 NOTE — PROGRESS NOTES
Comprehensive Nutrition Assessment    Type and Reason for Visit:  Reassess    Nutrition Recommendations/Plan:   Continue ADULT DIET; Regular; 4 carb choices per meal diet order. Continue Ensure high-protein with breakfast and dinner meals. Monitor appetite, meal intake, and acceptance/intake of ONS - please document meal intake % and ONS intake % in flow sheets. Please re-check patient's weight since weights vary greatly between 9/10/22 and 9/16/22 - please use standing scale, if possible. Monitor nutrition-related labs, bowel function, and weight trends. Malnutrition Assessment:  Malnutrition Status:   At risk for malnutrition (09/16/22 1613)    Context:  Acute Illness     Findings of the 6 clinical characteristics of malnutrition:  Energy Intake:  50% or less of estimated energy requirements for 5 or more days  Weight Loss:  Unable to assess (- 20.4% weight difference between 7/17/22 and 9/16/22)     Body Fat Loss:  Unable to assess (COVID-19 +)     Muscle Mass Loss:  Unable to assess (COVID-19 +)    Fluid Accumulation:  No significant fluid accumulation     Strength:  Not Performed    Nutrition Assessment:    patient remains unchanged from a nutritional standpoint since last RD assessment; she remains at risk for further compromise d/t minimal po intake (1-25% x 1 meal on 9/15/22 noted) and ongoing respiratory issues; will continue ADULT DIET; regular; 4 carb choices per meal + Ensure high-protein with breakfast and dinner meals    Nutrition Related Findings:    patient is A & O x 4; she had an image-guided placement of R chest tube + other chest tube was removed at that time on 9/15/22; last documented BM was on 9/15/22; patient consumed 1-25% x 1 meal on 9/15/22 - minimal po intake data documented in flow sheets for this admission; abdomen is round, soft, and bowel sounds are active; she has low-dose SSI, protonix, and decadron ordered at this time Wound Type: Surgical Incision (surgical incision on back from recent back surgery; R chest tube in place)       Current Nutrition Intake & Therapies:    Average Meal Intake: 1-25% (x 1 meal on 9/15/22 documented)  Average Supplements Intake: Unable to assess  ADULT DIET; Regular; 4 carb choices (60 gm/meal)  ADULT ORAL NUTRITION SUPPLEMENT; Breakfast, Dinner; Low Calorie/High Protein Oral Supplement    Anthropometric Measures:  Height: 5' 3\" (160 cm)  Ideal Body Weight (IBW): 115 lbs (52 kg)    Admission Body Weight: 126 lb 12.2 oz (57.5 kg) (obtained on 9/10/22; actual weight)  Current Body Weight: 107 lb 6 oz (48.7 kg) (obtained on 9/16/22; actual weight), 93.4 % IBW.  Weight Source: Bed Scale  Current BMI (kg/m2): 19  Usual Body Weight: 134 lb 14 oz (61.2 kg) (obtained on 7/17/22; actual weight)  % Weight Change (Calculated): -20.4  Weight Adjustment For: No Adjustment                 BMI Categories: Underweight (BMI less than 22) age over 72    Estimated Daily Nutrient Needs:  Energy Requirements Based On: Kcal/kg  Weight Used for Energy Requirements: Current  Energy (kcal/day): 1225 - 1372 kcals based on 25-28 kcals/kg/CBW  Weight Used for Protein Requirements: Current  Protein (g/day): 74 - 78 g protein based on 1.5-1.6 g/kg/CBW  Method Used for Fluid Requirements: 1 ml/kcal  Fluid (ml/day): 1225 - 1372 ml    Nutrition Diagnosis:   Inadequate oral intake related to inadequate protein-energy intake, impaired respiratory function, increase demand for energy/nutrients as evidenced by poor intake prior to admission, intake 0-25%, intake 51-75%, weight loss, lab values    Nutrition Interventions:   Food and/or Nutrient Delivery: Continue Current Diet, Continue Oral Nutrition Supplement  Nutrition Education/Counseling: No recommendation at this time  Coordination of Nutrition Care: Continue to monitor while inpatient       Goals:  Previous Goal Met: No Progress toward Goal(s)  Goals: Meet at least 75% of estimated needs, by next RD assessment       Nutrition

## 2022-09-16 NOTE — PROGRESS NOTES
Shift report given to Juanjo Flores RN. Pt stable. Denies any further needs Care of pt transferred at this time.

## 2022-09-16 NOTE — CARE COORDINATION
INTERDISCIPLINARY PLAN OF CARE CONFERENCE    Date/Time: 9/16/2022 4:31 PM  Completed by: Gigi Fonseca RN, Case Management      Patient Name:  Clary Obando  YOB: 1955  Admitting Diagnosis: Pneumonia due to COVID-19 virus [U07.1, J12.82]     Admit Date/Time:  9/9/2022  5:21 PM    Chart reviewed. Interdisciplinary team contacted or reviewed plan related to patient progress and discharge plans. Disciplines included Case Management, Nursing, and Dietitian. Current Status: IP 09/10/2022  PT/OT recommendation for discharge plan of care: not ordered (chest tube)    Expected D/C Disposition:  Home w/dtr    Discharge Plan Comments: chart review. Pt has been staying with daughter as spouse works FT. Her plan is to return home to her daughters for 24/7 assist until she is well enough to return home. +Actives with [facility] and will resume services upon DC. +[place] home O2 (3 liters baseline).  CM following      Home O2 in place on admit: Yes  Pt informed of need to bring portable home O2 tank on day of discharge for nursing to connect prior to leaving:  Not Indicated  Verbalized agreement/Understanding:  Not Indicated

## 2022-09-16 NOTE — PROGRESS NOTES
floor: Ramp  Steps to second floor: N/A  Bathroom: [facility], [employer], Shower Chair  and comfort height toilet without grab bars   Equipment owned: rollator, Shower Chair, lift chair, home O2 (4L) continuous, pulse ox and nebulizer      Preadmission Status:  Pt. Able to drive: No,  drive patient  Pt Fully independent with ADLs: Indep with bathing and dressing   Pt. Required assistance from family for: Patient's spouse does cooking, laundry, cleaning. pt's spouse completes the yard work   Pt. Fully independent for transfers and gait and walked with rollator  History of falls Yes, fell on memorial day  Recent stay at [facility]. Pt reports she has been staying with her dtr who provides 24hr care and works from home. Pain   Yes  Location: cheat  Ratin /10  Pain Medicine Status: Pain med requested and RN notified     Cognition    A&O x4   Able to follow 2 step commands    Subjective  Patient lying supine in bed with no family present   Pt agreeable to this OT eval & tx. Upper Extremity ROM:    WFL,  pt able to perform all bed mobility, transfers, and gait without ROM limitation. Upper Extremity Strength:    BUE strength impaired but not formally assessed w/ MMT    Upper Extremity Sensation    WFL    Upper Extremity Proprioception:  Impaired    Coordination and Tone  Impaired due to weakness    Balance  Functional Sitting Balance:  Diminished- CGA  Functional Standing Balance:Impaired/NT due to dizziness while seated at EOB. Bed mobility:    Supine to sit:   Min A  Sit to supine: Mod A  Rolling:    Min A  Scooting in sitting:  CGA  Scooting to head of bed:    Mod A of 2    Bridging:   Not Tested    Transfers:  (unable to assess transfer due to dizziness in sitting)  Sit to stand:  Not Tested  Stand to sit:  Not Tested  Bed to chair:   Not Tested  Standard toilet: Not Tested  Bed to [facility]:  Not Tested    Dressing:      UE:   Not Tested  LE:    Not Tested    Bathing:    UE:  Not Tested  LE:  Not Tested    Eating:   Setup    Toileting:  Not Tested    Grooming: Not Tested    Activity Tolerance   Pt completed therapy session with Dizziness noted with position changes    After returning to supine from sitting EOB and experiencing dizziness. SpO2: 92% on 3L O2  HR: 106bpm  BP: 117/84     Positioning Needs: In bed, call light and needs in reach. Alarm Set    Exercise / Activities Initiated:   Elbow flex/ext  Shoulder flex/ext:  x10  Scapular retraction:  x10  shoulder elevation:  x10    Patient/Family Education:   Role of OT  Recommendations for DC    Assessment of Deficits: Pt seen for Occupational therapy evaluation in acute care setting. Pt demonstrated decreased Activity tolerance, ADLs, IADLs, Balance , Bathing, Bed mobility, Dressing, ROM, Safety Awareness, Strength, Transfers, and Coping Skills. Pt functioning below baseline and will likely benefit from skilled occupational therapy services to maximize safety and independence. Goal(s) : To be met in 3 Visits:  1). Bed to toilet/BSC: Min A and with use of RW    To be met in 5 Visits:  1). Supine to/from Sit:  Supervision  2). Upper Body Bathing:   Supervision  3). Lower Body Bathing:   Min A  4). Upper Body Dressing:  Supervision  5). Lower Body Dressing:  Min A  6). Pt to demonstrate UE exs x 15 reps with minimal cues    Rehabilitation Potential:  Fair for goals listed above. Strengths for achieving goals include: Pt motivated, PLOF, and Pt cooperative  Barriers to achieving goals include:  Complexity of condition     Plan: To be seen 3-5 x/wk while in acute care setting for therapeutic exercises, bed mobility, transfers, dressing, bathing, family/patient education, ADL/IADL retraining, energy conservation training.        ALVARO JohnsonR/L #[id number]        If patient discharges from this facility prior to next visit, this note will serve as the Discharge Summary

## 2022-09-16 NOTE — PROGRESS NOTES
IM Progress Note    Admit Date:  9/9/2022    Interval history:    ILD with right pneumothorax, covid positive  S.p chest tube placement , back to home o2 of 4 L   Worsening PTX on 9/15--> chest tube replaced     Subjective:  Ms. Obando stable. Looks weak, fatigued   Complains of pain at chest tube site. SOB better; PTX improved . Oxygen saturation stable, on 4 L      Objective:   Patient Vitals for the past 4 hrs:   Pulse Resp SpO2   09/16/22 1134 (!) 116 19 99 %            Intake/Output Summary (Last 24 hours) at 9/16/2022 1432  Last data filed at 9/16/2022 0340  Gross per 24 hour   Intake 180 ml   Output 175 ml   Net 5 ml         Physical Exam:    Gen: No distress. Alert. Awake, oriented . Thin , elderly female . Looks ill , fatigued   Eyes: PERRL. No sclera icterus. No conjunctival injection. ENT: No discharge. Pharynx clear. Neck: No JVD. Trachea midline. Resp: No accessory muscle use. Diminished breath sounds . No crackles. No wheezes. No rhonchi. R chest tube to suction   CV: Regular rate. Regular rhythm. No murmur. No rub. No edema. Capillary Refill: Brisk,< 3 seconds   Peripheral Pulses: +2 palpable, equal bilaterally   GI: Non-tender. Non-distended. Normal bowel sounds. Skin: Warm and dry. No nodule on exposed extremities. No rash on exposed extremities. M/S: No cyanosis. No joint deformity. No clubbing. Neuro: Awake. Grossly nonfocal    Psych: Oriented x 3. No anxiety or agitation.          Medications:  enoxaparin, 30 mg, Daily  albuterol sulfate HFA, 2 puff, TID  benzonatate, 200 mg, TID  mupirocin, , BID  dexamethasone, 6 mg, Daily  acetaminophen, 1,000 mg, Once  atorvastatin, 40 mg, Daily  guaiFENesin, 1,200 mg, QAM  levothyroxine, 125 mcg, Daily  magnesium oxide, 400 mg, Daily  nadolol, 20 mg, Daily  pantoprazole, 40 mg, QAM AC  insulin lispro, 0-4 Units, TID WC  insulin lispro, 0-4 Units, Nightly  sodium chloride flush, 5-40 mL, 2 times per day  sertraline, 200 mg, Daily  Pirfenidone, 3 tablet, TID    PRN Medications:  loperamide, 2 mg, 4x Daily PRN  guaiFENesin-dextromethorphan, 5 mL, Q4H PRN  albuterol sulfate HFA, 2 puff, Q6H PRN  hydrOXYzine HCl, 10 mg, Q8H PRN  glucose, 4 tablet, PRN  dextrose bolus, 125 mL, PRN   Or  dextrose bolus, 250 mL, PRN  glucagon (rDNA), 1 mg, PRN  dextrose, , Continuous PRN  sodium chloride flush, 5-40 mL, PRN  sodium chloride, , PRN  ondansetron, 4 mg, Q8H PRN   Or  ondansetron, 4 mg, Q6H PRN  polyethylene glycol, 17 g, Daily PRN  acetaminophen, 650 mg, Q6H PRN   Or  acetaminophen, 650 mg, Q6H PRN  morphine, 2 mg, Q4H PRN  morphine, 4 mg, Q4H PRN  mirtazapine, 30 mg, Nightly PRN  methocarbamol, 750 mg, TID PRN        Data:  CBC:   Recent Labs     09/14/22  0440 09/15/22  0431 09/16/22  0427   WBC 12.0* 12.0* 11.6*   HGB 9.9* 9.8* 9.9*   HCT 30.6* 31.7* 31.0*   MCV 83.1 82.9 82.0    320 314       BMP:   Recent Labs     09/14/22  0440 09/15/22  0431 09/16/22  0426    139 137   K 3.9 4.0 3.8   CL 98* 100 99   CO2 35* 31 31   BUN 14 13 14   CREATININE <0.5* <0.5* <0.5*       LIVER PROFILE:   No results for input(s): AST, ALT, LIPASE, BILIDIR, BILITOT, ALKPHOS in the last 72 hours. Invalid input(s): AMYLASE,  ALB    PT/INR:   Recent Labs     09/15/22  0431   PROTIME 15.2*   INR 1.21*         CULTURES  COVID detected  Influenza A and B not detected  Blood cultures no growth to date     RADIOLOGY  XR CHEST PORTABLE   Final Result   New right chest tube in place, resolution of right pneumothorax         CT GUIDED CHEST TUBE   Final Result   Successful CT guided placement of a right chest tube. CT GUIDED NEEDLE PLACEMENT   Final Result   Successful CT guided placement of a right chest tube. XR CHEST PORTABLE   Final Result   Slight interval retraction of right pleural catheter with increasing volume   of right pneumothorax. Replacement or repositioning of the pleural catheter   is advised.       Findings were called at 8: 09 a.m. .  At 8:24 a.m., findings were discussed   with Marty Weiner RN, on 9/15/2022. XR CHEST PORTABLE   Final Result   No change position of the right chest tube with a new small right apical   pneumothorax and new subcutaneous air along the right chest wall. Some of   the holes in the pigtail catheter are within the chest wall. Otherwise stable appearance of chest with diffuse interstitial prominence. XR CHEST PORTABLE   Final Result   No pneumothorax on the right. Pleural drainage catheter seen on the right      Fibrotic changes in the lungs, similar to prior         XR CHEST PORTABLE   Final Result   1. Complete resolution of the previously noted right apical pneumothorax. XR CHEST PORTABLE   Final Result   1. Worsening small to moderate right apical pneumothorax. XR CHEST PORTABLE   Final Result   Diffuse bilateral honeycomb lung disease. No pneumothorax evident. Pigtail   partially in the right pleural space. XR CHEST PORTABLE   Final Result   Partial retraction of right chest tube. Distal side port may be extra   thoracic. There is perhaps minimal subcutaneous air. Small right apical pneumothorax, decreased in the interval.      Stable extensive interstitial pattern. XR CHEST PORTABLE   Final Result   Increased size of right-sided pneumothorax measuring 1.6 cm with stable   positioning of the right apically oriented pigtail thoracostomy tube. The findings were sent to the [facility] Results Communication Team at 15:57 on   9/10/2022 to be communicated to a licensed caregiver. XR CHEST PORTABLE   Final Result   Right chest tube is in normal position. No pneumothorax is evident. Interstitial opacities, correlating with extensive emphysematous disease. XR CHEST PORTABLE   Final Result   Interval placement of a locking loop chest tube at the right lung apex with   resolved pneumothorax.          CT CHEST PULMONARY EMBOLISM W CONTRAST   Final Result   No evidence of pulmonary embolism      Right apical, anterior and lateral pneumothorax approximately 30%. Chest tube   extends along the right lateral chest wall however tip is extrapleural and   lateral to the upper ribcage      Coronary artery/atherosclerotic disease. Cardiomegaly      Extensive bullous changes and pulmonary fibrosis      Small right pleural effusion      RECOMMENDATIONS:   Re-evaluate for reinsertion of right chest tube         XR CHEST PORTABLE   Final Result   Nearly completely resolved right pneumothorax. Chest tube along the right   lateral hemithorax however tip appears extrapleural.         XR CHEST PORTABLE   Final Result   1. Moderate right-sided pneumothorax with approximately 4 cm of pleural   separation at the lung apex. No definite evidence of tension morphology at   this time.       2.  Chronic interstitial lung disease         XR CHEST PORTABLE    (Results Pending)         Assessment/Plan:  Acute on chronic respiratory failure  Hx of ILD on 3 L at home  Covid 19 infection   Right pneumothorax  -seen by pulmonology  -pt with hx of Pulmonary fibrosis  and Hx of cryptogenic organizing pneumonia with chronic resp failure with recurrent admissions  - imaging with right sided pneumothorax likely induced by cough   -> s.p chest tube placement   - doubt hypoxia due to covid , as it improved since chest tube is placed and now back on home level  - can wean steroids  - resumed Pirfenidone   -Continue Decadron 6 mg daily  -Was requiring O2 up to 5 L , oxygen saturation now stable at baseline O2 4 L  - 9/15 worsening pneumothorax ; chest tube not in position.  -> it was replaced with new chest tube ;   Pulmonology contacted cardiothoracic surgery for possible transfer to [facility]; recommendation was to place pneumostat      Chronic diastolic CHF  last echo as above, from 5/6/22 EF 55%  -BNP mildly elevated  - pt was taken of diuretics recently for weight loss - monitor for fluid overload and resume lasix as needed only      S.p recent L1 burst fracture   -  s/p T11-L3 PDFF with L1 corpectomy at  ( 8/22)     DM type 2  -Has been taken off diuretics and diabetic meds for progressive weight loss   - hyperglycemia with steroids  -SSI  -continue to monitor BG     HTN  -BP stable  -on nadolol     HLD  -continue statin     Hypothyroidism  -continue synthroid     Iron Deficiency Anemia  - Recent Hemorrhoidal bleed -recent colonoscopy neg  - hb at 8 .4  -continue PO iron  -Hgb stable     Depression  Anxiety  -continue zoloft  -prn ativan    Diarrhea  -Imodium as needed  Cut down intake of tomato soup  Diarrhea resolved     DVT Prophylaxis: Lovenox, being held   Diet: ADULT DIET; Regular; 4 carb choices (60 gm/meal)  ADULT ORAL NUTRITION SUPPLEMENT; Breakfast, Dinner; Low Calorie/High Protein Oral Supplement  Code Status: Full Code    S/P new chest tube placement on 9/15. Plan pneumostat placement today .    Outpt follow up with CT surgeon, Dr. Leobardo Martinez MD    9/16/2022

## 2022-09-16 NOTE — PROGRESS NOTES
Pulmonary & Critical Care Medicine Progress Note  Hospital Day: 8   CC: COVID, pneumothorax    Subjective:   Right-sided chest tube replaced yesterday d/t displacement of existing tube & subsequent worsening pneumo. Worked with PT/OT. Emotional about prolonged hospital stay. IV: peripheral    PHYSICAL EXAM:  Blood pressure 120/78, pulse 100, temperature 98.4 °F (36.9 °C), temperature source Oral, resp. rate 18, height 5' 3\" (1.6 m), weight 107 lb 6 oz (48.7 kg), SpO2 95 %, not currently breastfeeding. on 3 L   General: ill appearing    Eyes: PERRL. No sclera icterus. No conjunctival injection. ENT: No discharge. Pharynx clear. Neck: Trachea midline. Normal thyroid. Resp: No accessory muscle use. + crackles. No wheezing. No rhonchi. No dullness on percussion. Right anterior chest tube with steady air leak   CV: Regular rate. Regular rhythm. No mumur or rub. No edema. Peripheral pulses are 2+. Capillary refill is less than 3 seconds. GI: Non-tender. Non-distended. No masses. No organomegaly. Normal bowel sounds. No hernia. Skin: Warm and dry. No nodule on exposed extremities. No rash on exposed extremities. Lymph: No cervical LAD. No supraclavicular LAD. M/S: No cyanosis. No joint deformity. No clubbing. Neuro: A&OX3.  Patellar reflexes are symmetric  Psych: No agitation, no anxiety, affect is tearful    Scheduled Meds:   albuterol sulfate HFA  2 puff Inhalation TID    benzonatate  200 mg Oral TID    [Held by provider] enoxaparin  40 mg SubCUTAneous Daily    mupirocin   Nasal BID    dexamethasone  6 mg Oral Daily    acetaminophen  1,000 mg Oral Once    atorvastatin  40 mg Oral Daily    guaiFENesin  1,200 mg Oral QAM    levothyroxine  125 mcg Oral Daily    magnesium oxide  400 mg Oral Daily    nadolol  20 mg Oral Daily    pantoprazole  40 mg Oral QAM AC    insulin lispro  0-4 Units SubCUTAneous TID WC    insulin lispro  0-4 Units SubCUTAneous Nightly    sodium chloride flush  5-40 mL IntraVENous 2 times per day    sertraline  200 mg Oral Daily    Pirfenidone  3 tablet Oral TID     Data:  CBC:   Recent Labs     09/14/22 0440 09/15/22  0431 09/16/22  0427   WBC 12.0* 12.0* 11.6*   HGB 9.9* 9.8* 9.9*   HCT 30.6* 31.7* 31.0*   MCV 83.1 82.9 82.0    320 314     BMP:   Recent Labs     09/14/22 0440 09/15/22  0431 09/16/22  0426    139 137   K 3.9 4.0 3.8   CL 98* 100 99   CO2 35* 31 31   BUN 14 13 14   CREATININE <0.5* <0.5* <0.5*     LIVER PROFILE:   No results for input(s): AST, ALT, LIPASE, BILIDIR, BILITOT, ALKPHOS in the last 72 hours. Invalid input(s): AMYLASE,  ALB    Microbiology:  9/9/2022 SARS-CoV-2 POSITIVE  and influenza negative  9/9/2022 blood NGTD    Imaging:  CTPA 9/9/22:  bullous emphysema, pulmonary fibrosis, right-sided pneumothorax    pCXR 9/11/22 AM:  small right apical pneumothorax, possible retraction of chest tube    pCXR 9/13/22 AM:  Worsening small to moderate right apical pneumothorax.   (found tube had been clamped)     pCXR 9/13/22 After unclamping:  Complete resolution of the previously noted right apical pneumothorax. pCXR 9/14/22 AM:  No pneumothorax on the right. Pleural drainage catheter seen on the right. Fibrotic changes in the lungs, similar to prior     pCXR 9/14/22 with tube clamped 4 hrs:  small apical PTX     pCXR 9/15/22 AM:  Slight interval retraction of right pleural catheter with increasing volume of right pneumothorax. Replacement or repositioning of the pleural catheter is advised. pCXR 9/16/22 AM:  New right chest tube in place. Resolution of right  pneumothorax. Soft tissue emphysema again seen in the right chest wall. No new airspace disease.     ASSESSMENT:  Acute on chronic hypoxemic respiratory failure, baseline 3 L O2  COVID-19 pneumonia  Secondary right-sided spontaneous pneumothorax   Right chest tube #1 9/9/22 - 9/15/22  Right chest tube #2 9/15/22   ILD, h/o  & NSIP v chronic HP now with acute exacerbation   Severe bullous changes/paraseptal emphysema - no tobacco history   Chronic diastolic CHF  C95-J9 PDFF withL1 corpectomy @  8/22/22 for L1 burst fracture      PLAN:  - COVID-19 isolation, droplet plus  -    Supplemental oxygen to maintain SaO2 >92%; wean as tolerated   Decadron D#7, 6 mg daily   Home Pirfenidone, monitoring for toxicity   Inhaled bronchodilators   Chest tube to -20 suction, daily CXR   OOB with assistance. PT/OT  Tentatively plan trial of pneumostat sent from [facility]. D/W Internal Medicine. I spent a total of 21 minutes on this encounter personally obtaining the patient history, reviewing labs, imaging and other data. I independently performed the above physical exam and updated this encounter note, assessment and plan as needed. Ranjith Carrera MD on 9/16/22 at 8:09 PM EDT    I spent a total of 15 minutes on this encounter on chart review, history taking and review of data. I independently performed a physical exam and updated this encounter note as needed.    Roge Ramos PA-C on 9/16/22 at 10:01 AM EDT

## 2022-09-16 NOTE — PROGRESS NOTES
Pt awake in bed. Assessment completed and medications given per MAR. VS as charted in flowsheet. A&Ox4. Chest tube secure and hooked to suction, dressing clean dry and intact. Pt denies any further needs at this time. Call light in reach and bed in lowest position.

## 2022-09-16 NOTE — PROGRESS NOTES
RT Inhaler-Nebulizer Bronchodilator Protocol Note    There is a bronchodilator order in the chart from a provider indicating to follow the RT Bronchodilator Protocol and there is an Initiate RT Inhaler-Nebulizer Bronchodilator Protocol order as well (see protocol at bottom of note). CXR Findings:  XR CHEST PORTABLE    Result Date: 9/15/2022  Slight interval retraction of right pleural catheter with increasing volume of right pneumothorax. Replacement or repositioning of the pleural catheter is advised. Findings were called at 8:09 a.m. .  At 8:24 a.m., findings were discussed with Cortes Miller RN, on 9/15/2022. XR CHEST PORTABLE    Result Date: 9/14/2022  No change position of the right chest tube with a new small right apical pneumothorax and new subcutaneous air along the right chest wall. Some of the holes in the pigtail catheter are within the chest wall. Otherwise stable appearance of chest with diffuse interstitial prominence. XR CHEST PORTABLE    Result Date: 9/14/2022  No pneumothorax on the right. Pleural drainage catheter seen on the right Fibrotic changes in the lungs, similar to prior       The findings from the last RT Protocol Assessment were as follows:   History Pulmonary Disease: Chronic pulmonary disease  Respiratory Pattern: Dyspnea on exertion or RR 21-25 bpm  Breath Sounds: Slightly diminished and/or crackles  Cough: Strong, spontaneous, non-productive  Indication for Bronchodilator Therapy: Decreased or absent breath sounds  Bronchodilator Assessment Score: 6    Aerosolized bronchodilator medication orders have been revised according to the RT Inhaler-Nebulizer Bronchodilator Protocol below. Respiratory Therapist to perform RT Therapy Protocol Assessment initially then follow the protocol. Repeat RT Therapy Protocol Assessment PRN for score 0-3 or on second treatment, BID, and PRN for scores above 3.     No Indications - adjust the frequency to every 6 hours PRN wheezing or bronchospasm, if no treatments needed after 48 hours then discontinue using Per Protocol order mode. If indication present, adjust the RT bronchodilator orders based on the Bronchodilator Assessment Score as indicated below. Use Inhaler orders unless patient has one or more of the following: on home nebulizer, not able to hold breath for 10 seconds, is not alert and oriented, cannot activate and use MDI correctly, or respiratory rate 25 breaths per minute or more, then use the equivalent nebulizer order(s) with same Frequency and PRN reasons based on the score. If a patient is on this medication at home then do not decrease Frequency below that used at home. 0-3 - enter or revise RT bronchodilator order(s) to equivalent RT Bronchodilator order with Frequency of every 4 hours PRN for wheezing or increased work of breathing using Per Protocol order mode. 4-6 - enter or revise RT Bronchodilator order(s) to two equivalent RT bronchodilator orders with one order with BID Frequency and one order with Frequency of every 4 hours PRN wheezing or increased work of breathing using Per Protocol order mode. 7-10 - enter or revise RT Bronchodilator order(s) to two equivalent RT bronchodilator orders with one order with TID Frequency and one order with Frequency of every 4 hours PRN wheezing or increased work of breathing using Per Protocol order mode. 11-13 - enter or revise RT Bronchodilator order(s) to one equivalent RT bronchodilator order with QID Frequency and an Albuterol order with Frequency of every 4 hours PRN wheezing or increased work of breathing using Per Protocol order mode. Greater than 13 - enter or revise RT Bronchodilator order(s) to one equivalent RT bronchodilator order with every 4 hours Frequency and an Albuterol order with Frequency of every 2 hours PRN wheezing or increased work of breathing using Per Protocol order mode.        Electronically signed by Erik Caal RCP on 9/15/2022 at 8:24 PM

## 2022-09-16 NOTE — FLOWSHEET NOTE
09/16/22 0045   Vital Signs   Temp 98.2 °F (36.8 °C)   Temp Source Oral   Heart Rate (!) 111   Heart Rate Source Monitor   Resp 20   /85   BP Location Right upper arm   MAP (Calculated) 100   Level of Consciousness 0   MEWS Score 3   Oxygen Therapy   SpO2 94 %   Pulse Oximeter Device Mode Intermittent   Pulse Oximeter Device Location Finger   O2 Device Nasal cannula     Pt awake in bed. VS as shown above. Pt states pain as a 6 out of 10. PRN morphine given at this time. Call light in reach and bed in lowest position.

## 2022-09-16 NOTE — PROGRESS NOTES
Inpatient Physical Therapy Evaluation and Treatment    Unit: PCU  Date:  9/16/2022  Patient Name:    Pam Mathis  Admitting diagnosis:  Pneumonia due to COVID-19 virus [U07.1, J12.82]  Admit Date:  9/9/2022  Precautions/Restrictions/WB Status/ Lines/ Wounds/ Oxygen: Fall risk, Bed/chair alarm, Lines -IV, Supplemental O2 (3L), and Drains (chest tube R), Telemetry, Continuous pulse oximetry, and Isolation Precautions: Droplet Plus - COVID    Treatment Time:  9:40 - 10:05  Treatment Number:  1   Timed Code Treatment Minutes: 15 minutes  Total Treatment Minutes:  25  minutes    Patient Goals for Therapy: none stated        Discharge Recommendations: SNF  DME needs for discharge: defer to facility       Therapy recommendation for EMS Transport: requires transport by cot due to dizziness with sitting EOB    Therapy recommendations for staff:   Assist of 1 to sit EOB if pt able to tolerate. History of Present Illness:   Per Dr. Rober Perkins:  Patient is a 69 y.o. female with  cryptongenic organizing pneumonia, pulm fibrosis , chronic hypoxic resp failure , atrial fibrillation, diastolic CHF,  HLD, hypothyroidism, GERD, HLD, depression, anxiety, chronic back pain who presented to ER for increasing sob for last few days     Recently had spine surgery for L1 burst fracture and did rehab at [facility] and went home about a week ago . Reports she developed slow increasing sob and using upto 6 L o2 to keep her spo2 above 90 %. No fevers or chills but has significant cough. Workup in ER showed hypoxia and new right sided pneumothorax and covid positive. Right sided chest tube placed and admitted to ICU for eval      Pt reports she feels fine since her chest tube was placed. Pain controlled.  No cough today   Has been taken off diuretics and diabetic meds for progressive weight loss     Home Health S4 Level Recommendation:  NA  AM-PAC Mobility Score       AM-PAC Inpatient Mobility without Stair Climbing Raw Score : of N/A    Gait gait deferred due to dizziness sitting EOB; pt ambulated 0 ft. Stair Training deferred, pt does not have stairs in home environment    Activity Tolerance   Pt completed therapy session with Dizziness noted with sitting EOB    After returning to supine from sitting EOB and experiencing dizziness. SpO2: 92% on 3L O2  HR: 106bpm  BP: 117/84     Positioning Needs   Pt in bed, alarm set, positioned in proper neutral alignment and pressure relief provided. Call light provided and all needs within reach    Exercises Initiated  all completed bilaterally unless indicated  Ankle Pumps x 10 reps    Other  None. Patient/Family Education   Pt educated on role of inpatient PT, POC, importance of continued activity, DC recommendations, safety awareness, transfer techniques, pursed lip breathing, pacing activity, and calling for assist with mobility. Assessment  Pt seen for Physical Therapy evaluation in acute care setting. Pt demonstrated decreased Activity tolerance, Balance, ROM, Safety, and Strength as well as decreased independence with Ambulation, Bed Mobility , and Transfers. Recommending SNF upon discharge as patient functioning well below baseline, demonstrates good rehab potential and unable to return home due to burden of care beyond caregiver ability, home environment not conducive to patient recovery, and limited safety awareness. Goals : To be met in 3 visits:  1). Independent with LE Ex x 10 reps    To be met in 6 visits:  1). Supine to/from sit: Supervision  2). Sit to/from stand: Supervision  3). Bed to chair: Supervision  4). Gait: Ambulate 50 ft.  with  SBA and use of rolling walker (RW)  5).   Tolerate B LE exercises 3 sets of 10-15 reps    Rehabilitation Potential: Good  Strengths for achieving goals include:   PLOF, Family Support, and Pt cooperative   Barriers to achieving goals include:    Weakness    Plan    To be seen 3-5 x / week  while in acute care setting for

## 2022-09-17 ENCOUNTER — APPOINTMENT (OUTPATIENT)
Dept: GENERAL RADIOLOGY | Age: 67
DRG: 177 | End: 2022-09-17
Payer: MEDICARE

## 2022-09-17 LAB
GLUCOSE BLD-MCNC: 100 MG/DL (ref 70–99)
GLUCOSE BLD-MCNC: 141 MG/DL (ref 70–99)
GLUCOSE BLD-MCNC: 145 MG/DL (ref 70–99)
GLUCOSE BLD-MCNC: 148 MG/DL (ref 70–99)
PERFORMED ON: ABNORMAL

## 2022-09-17 PROCEDURE — 6360000002 HC RX W HCPCS: Performed by: INTERNAL MEDICINE

## 2022-09-17 PROCEDURE — 6370000000 HC RX 637 (ALT 250 FOR IP): Performed by: INTERNAL MEDICINE

## 2022-09-17 PROCEDURE — 2700000000 HC OXYGEN THERAPY PER DAY

## 2022-09-17 PROCEDURE — 2060000000 HC ICU INTERMEDIATE R&B

## 2022-09-17 PROCEDURE — 2580000003 HC RX 258: Performed by: INTERNAL MEDICINE

## 2022-09-17 PROCEDURE — 94761 N-INVAS EAR/PLS OXIMETRY MLT: CPT

## 2022-09-17 PROCEDURE — 94640 AIRWAY INHALATION TREATMENT: CPT

## 2022-09-17 PROCEDURE — 71045 X-RAY EXAM CHEST 1 VIEW: CPT

## 2022-09-17 PROCEDURE — 6360000002 HC RX W HCPCS

## 2022-09-17 PROCEDURE — 99232 SBSQ HOSP IP/OBS MODERATE 35: CPT | Performed by: INTERNAL MEDICINE

## 2022-09-17 RX ORDER — ALBUTEROL SULFATE 2.5 MG/3ML
2.5 SOLUTION RESPIRATORY (INHALATION) 3 TIMES DAILY
Status: DISCONTINUED | OUTPATIENT
Start: 2022-09-17 | End: 2022-09-23 | Stop reason: HOSPADM

## 2022-09-17 RX ORDER — ALBUTEROL SULFATE 2.5 MG/3ML
2.5 SOLUTION RESPIRATORY (INHALATION) 3 TIMES DAILY
Status: DISCONTINUED | OUTPATIENT
Start: 2022-09-17 | End: 2022-09-17

## 2022-09-17 RX ADMIN — SERTRALINE 200 MG: 100 TABLET, FILM COATED ORAL at 09:59

## 2022-09-17 RX ADMIN — ATORVASTATIN CALCIUM 40 MG: 40 TABLET, FILM COATED ORAL at 10:00

## 2022-09-17 RX ADMIN — ALBUTEROL SULFATE 2.5 MG: 2.5 SOLUTION RESPIRATORY (INHALATION) at 20:25

## 2022-09-17 RX ADMIN — SODIUM CHLORIDE, PRESERVATIVE FREE 10 ML: 5 INJECTION INTRAVENOUS at 10:00

## 2022-09-17 RX ADMIN — Medication 2 PUFF: at 08:04

## 2022-09-17 RX ADMIN — MORPHINE SULFATE 4 MG: 4 INJECTION, SOLUTION INTRAMUSCULAR; INTRAVENOUS at 21:57

## 2022-09-17 RX ADMIN — NADOLOL 20 MG: 40 TABLET ORAL at 09:59

## 2022-09-17 RX ADMIN — BENZONATATE 100 MG: 100 CAPSULE ORAL at 19:46

## 2022-09-17 RX ADMIN — MORPHINE SULFATE 4 MG: 4 INJECTION, SOLUTION INTRAMUSCULAR; INTRAVENOUS at 00:04

## 2022-09-17 RX ADMIN — PIRFENIDONE 3 TABLET: 267 TABLET, FILM COATED ORAL at 14:10

## 2022-09-17 RX ADMIN — BENZONATATE 100 MG: 100 CAPSULE ORAL at 14:10

## 2022-09-17 RX ADMIN — BENZONATATE 100 MG: 100 CAPSULE ORAL at 09:59

## 2022-09-17 RX ADMIN — SODIUM CHLORIDE, PRESERVATIVE FREE 10 ML: 5 INJECTION INTRAVENOUS at 19:47

## 2022-09-17 RX ADMIN — PANTOPRAZOLE SODIUM 40 MG: 40 TABLET, DELAYED RELEASE ORAL at 05:08

## 2022-09-17 RX ADMIN — ALBUTEROL SULFATE 2.5 MG: 2.5 SOLUTION RESPIRATORY (INHALATION) at 15:10

## 2022-09-17 RX ADMIN — ENOXAPARIN SODIUM 30 MG: 100 INJECTION SUBCUTANEOUS at 09:59

## 2022-09-17 RX ADMIN — MORPHINE SULFATE 4 MG: 4 INJECTION, SOLUTION INTRAMUSCULAR; INTRAVENOUS at 04:09

## 2022-09-17 RX ADMIN — MORPHINE SULFATE 4 MG: 4 INJECTION, SOLUTION INTRAMUSCULAR; INTRAVENOUS at 16:12

## 2022-09-17 RX ADMIN — ACETAMINOPHEN 650 MG: 325 TABLET ORAL at 19:46

## 2022-09-17 RX ADMIN — DEXAMETHASONE 6 MG: 4 TABLET ORAL at 09:59

## 2022-09-17 RX ADMIN — GUAIFENESIN 1200 MG: 600 TABLET, EXTENDED RELEASE ORAL at 09:59

## 2022-09-17 RX ADMIN — LEVOTHYROXINE SODIUM 125 MCG: 125 TABLET ORAL at 05:08

## 2022-09-17 RX ADMIN — MORPHINE SULFATE 4 MG: 4 INJECTION, SOLUTION INTRAMUSCULAR; INTRAVENOUS at 12:12

## 2022-09-17 ASSESSMENT — PAIN SCALES - GENERAL
PAINLEVEL_OUTOF10: 7
PAINLEVEL_OUTOF10: 8
PAINLEVEL_OUTOF10: 6
PAINLEVEL_OUTOF10: 7
PAINLEVEL_OUTOF10: 6
PAINLEVEL_OUTOF10: 7
PAINLEVEL_OUTOF10: 7
PAINLEVEL_OUTOF10: 5
PAINLEVEL_OUTOF10: 3
PAINLEVEL_OUTOF10: 7
PAINLEVEL_OUTOF10: 8
PAINLEVEL_OUTOF10: 7
PAINLEVEL_OUTOF10: 8

## 2022-09-17 ASSESSMENT — PAIN DESCRIPTION - DESCRIPTORS
DESCRIPTORS: ACHING;TIGHTNESS
DESCRIPTORS: TENDER
DESCRIPTORS: ACHING
DESCRIPTORS: STABBING;TENDER

## 2022-09-17 ASSESSMENT — PAIN DESCRIPTION - LOCATION
LOCATION: CHEST
LOCATION: BACK
LOCATION: HEAD
LOCATION: BACK;HEAD;CHEST
LOCATION: BACK
LOCATION: BACK

## 2022-09-17 ASSESSMENT — PAIN DESCRIPTION - PAIN TYPE: TYPE: ACUTE PAIN

## 2022-09-17 ASSESSMENT — PAIN DESCRIPTION - ORIENTATION
ORIENTATION: MID
ORIENTATION: RIGHT
ORIENTATION: RIGHT

## 2022-09-17 NOTE — PROGRESS NOTES
Cxray showed worsening pneumothorax post pneumostat placement. Chest tube back to -20 suction. Pt back in bed. SpO2 98% on 4L at this time.

## 2022-09-17 NOTE — PROGRESS NOTES
Patient called out with reports of pain. States pain is worsening in her back near surgery site and complains of chronic pain. Chest pain that patient was feeling earlier has resolved with position change. Patient does at times appear to be anxious. Currently on high flow oxygen which patient claims is also helpful.   Pain medication provided as ordered for pain

## 2022-09-17 NOTE — FLOWSHEET NOTE
09/16/22 2543   Pain Assessment   Pain Assessment 0-10   Pain Level 7   Patient's Stated Pain Goal 2   Pain Location Chest;Shoulder   Pain Orientation Right   Pain Descriptors Stabbing; Ancil Guardian; Tender   Functional Pain Assessment Activities are not prevented   Pain Type Acute pain   Pain Frequency Continuous   Non-Pharmaceutical Pain Intervention(s) Repositioned; Rest       PRN 4mg morphine given at this time for pain.

## 2022-09-17 NOTE — PROGRESS NOTES
CXR demonstrated recurrence of moderate pneumothorax after few hours of pneumostat trial.  Removed pneumostat valve and placed chest tube back to -20 suction with several minutes of intermittent air leak. Oxygenation improved.

## 2022-09-17 NOTE — PROGRESS NOTES
Hospitalist Progress Note      PCP: Ruby Clark MD    Date of Admission: 9/9/2022    Subjective: had pneumostat placed today, awaiting repeat CXR, SOB better    Medications:  Reviewed    Infusion Medications    dextrose      sodium chloride       Scheduled Medications    albuterol  2.5 mg Nebulization TID    enoxaparin  30 mg SubCUTAneous Daily    benzonatate  100 mg Oral TID    dexamethasone  6 mg Oral Daily    acetaminophen  1,000 mg Oral Once    atorvastatin  40 mg Oral Daily    guaiFENesin  1,200 mg Oral QAM    levothyroxine  125 mcg Oral Daily    magnesium oxide  400 mg Oral Daily    nadolol  20 mg Oral Daily    pantoprazole  40 mg Oral QAM AC    insulin lispro  0-4 Units SubCUTAneous TID WC    insulin lispro  0-4 Units SubCUTAneous Nightly    sodium chloride flush  5-40 mL IntraVENous 2 times per day    sertraline  200 mg Oral Daily    Pirfenidone  3 tablet Oral TID     PRN Meds: benzonatate, loperamide, guaiFENesin-dextromethorphan, albuterol sulfate HFA, hydrOXYzine HCl, glucose, dextrose bolus **OR** dextrose bolus, glucagon (rDNA), dextrose, sodium chloride flush, sodium chloride, ondansetron **OR** ondansetron, polyethylene glycol, acetaminophen **OR** acetaminophen, morphine, morphine, mirtazapine, methocarbamol      Intake/Output Summary (Last 24 hours) at 9/17/2022 1631  Last data filed at 9/17/2022 1212  Gross per 24 hour   Intake 300 ml   Output --   Net 300 ml       Physical Exam Performed:    /62   Pulse 73   Temp 97.5 °F (36.4 °C) (Axillary)   Resp 18   Ht 5' 3\" (1.6 m)   Wt 109 lb 3.2 oz (49.5 kg)   SpO2 97%   BMI 19.34 kg/m²        Gen: No distress. Alert. Awake, oriented . Thin , elderly female . Looks ill , fatigued   Eyes: PERRL. No sclera icterus. No conjunctival injection. ENT: No discharge. Pharynx clear. Neck: No JVD. Trachea midline. Resp: No accessory muscle use. Diminished breath sounds . No crackles. No wheezes. No rhonchi.    R chest tube to suction CV: Regular rate. Regular rhythm. No murmur. No rub. No edema. Capillary Refill: Brisk,< 3 seconds   Peripheral Pulses: +2 palpable, equal bilaterally   GI: Non-tender. Non-distended. Normal bowel sounds. Skin: Warm and dry. No nodule on exposed extremities. No rash on exposed extremities. M/S: No cyanosis. No joint deformity. No clubbing. Neuro: Awake. Grossly nonfocal    Psych: Oriented x 3. No anxiety or agitation. Labs:   Recent Labs     09/15/22  0431 09/16/22  0427   WBC 12.0* 11.6*   HGB 9.8* 9.9*   HCT 31.7* 31.0*    314     Recent Labs     09/15/22  0431 09/16/22  0426    137   K 4.0 3.8    99   CO2 31 31   BUN 13 14   CREATININE <0.5* <0.5*   CALCIUM 8.6 8.6     No results for input(s): AST, ALT, BILIDIR, BILITOT, ALKPHOS in the last 72 hours. Recent Labs     09/15/22  0431   INR 1.21*     No results for input(s): [place] in the last 72 hours. Urinalysis:      Lab Results   Component Value Date/Time    NITRU Negative 09/09/2022 05:49 PM    WBCUA 3-5 05/28/2022 06:39 AM    BACTERIA Rare 05/28/2022 06:39 AM    RBCUA 0-2 05/28/2022 06:39 AM    BLOODU Negative 09/09/2022 05:49 PM    SPECGRAV 1.015 09/09/2022 05:49 PM    GLUCOSEU Negative 09/09/2022 05:49 PM       Radiology:  XR CHEST PORTABLE   Final Result   New moderate right pneumothorax. The right chest tube is now more inferiorly   located than before. The chest appears otherwise stable. XR CHEST PORTABLE   Final Result   No right-sided pneumothorax, within indwelling chest tube noted. Extensive interstitial infiltrates are seen, similar to the prior exam.         XR CHEST PORTABLE   Final Result   Similar appearing chest.         XR CHEST PORTABLE   Final Result   New right chest tube in place, resolution of right pneumothorax         CT GUIDED CHEST TUBE   Final Result   Successful CT guided placement of a right chest tube.          CT GUIDED NEEDLE PLACEMENT   Final Result   Successful CT guided placement of a right chest tube. XR CHEST PORTABLE   Final Result   Slight interval retraction of right pleural catheter with increasing volume   of right pneumothorax. Replacement or repositioning of the pleural catheter   is advised. Findings were called at 8:09 a.m. .  At 8:24 a.m., findings were discussed   with Sarah Hills RN, on 9/15/2022. XR CHEST PORTABLE   Final Result   No change position of the right chest tube with a new small right apical   pneumothorax and new subcutaneous air along the right chest wall. Some of   the holes in the pigtail catheter are within the chest wall. Otherwise stable appearance of chest with diffuse interstitial prominence. XR CHEST PORTABLE   Final Result   No pneumothorax on the right. Pleural drainage catheter seen on the right      Fibrotic changes in the lungs, similar to prior         XR CHEST PORTABLE   Final Result   1. Complete resolution of the previously noted right apical pneumothorax. XR CHEST PORTABLE   Final Result   1. Worsening small to moderate right apical pneumothorax. XR CHEST PORTABLE   Final Result   Diffuse bilateral honeycomb lung disease. No pneumothorax evident. Pigtail   partially in the right pleural space. XR CHEST PORTABLE   Final Result   Partial retraction of right chest tube. Distal side port may be extra   thoracic. There is perhaps minimal subcutaneous air. Small right apical pneumothorax, decreased in the interval.      Stable extensive interstitial pattern. XR CHEST PORTABLE   Final Result   Increased size of right-sided pneumothorax measuring 1.6 cm with stable   positioning of the right apically oriented pigtail thoracostomy tube. The findings were sent to the [facility] Results Communication Team at 15:57 on   9/10/2022 to be communicated to a licensed caregiver. XR CHEST PORTABLE   Final Result   Right chest tube is in normal position.   No pneumothorax is evident. Interstitial opacities, correlating with extensive emphysematous disease. XR CHEST PORTABLE   Final Result   Interval placement of a locking loop chest tube at the right lung apex with   resolved pneumothorax. CT CHEST PULMONARY EMBOLISM W CONTRAST   Final Result   No evidence of pulmonary embolism      Right apical, anterior and lateral pneumothorax approximately 30%. Chest tube   extends along the right lateral chest wall however tip is extrapleural and   lateral to the upper ribcage      Coronary artery/atherosclerotic disease. Cardiomegaly      Extensive bullous changes and pulmonary fibrosis      Small right pleural effusion      RECOMMENDATIONS:   Re-evaluate for reinsertion of right chest tube         XR CHEST PORTABLE   Final Result   Nearly completely resolved right pneumothorax. Chest tube along the right   lateral hemithorax however tip appears extrapleural.         XR CHEST PORTABLE   Final Result   1. Moderate right-sided pneumothorax with approximately 4 cm of pleural   separation at the lung apex. No definite evidence of tension morphology at   this time.       2.  Chronic interstitial lung disease                 Assessment/Plan:    Active Hospital Problems    Diagnosis     COVID-19 [U07.1]      Priority: Medium    Secondary spontaneous pneumothorax [J93.12]      Priority: Medium    Pneumonia due to COVID-19 virus [U07.1, J12.82]      Priority: Medium    Primary spontaneous pneumothorax [J93.11]      Priority: Medium    Acute respiratory failure with hypoxia (HCC) [J96.01]     Acute on chronic respiratory failure with hypoxemia (HCC) [J96.21]            Acute on chronic respiratory failure  Hx of ILD on 3 L at home  Covid 19 infection   Right pneumothorax  -seen by pulmonology  -pt with hx of Pulmonary fibrosis  and Hx of cryptogenic organizing pneumonia with chronic resp failure with recurrent admissions  - imaging with right sided pneumothorax likely induced by cough   -> s.p chest tube placement   - doubt hypoxia due to covid , as it improved since chest tube is placed and now back on home level  - can wean steroids  - resumed Pirfenidone   -Continue Decadron 6 mg daily  -Was requiring O2 up to 5 L , oxygen saturation now stable at baseline O2 4 L  - 9/15 worsening pneumothorax ; chest tube not in position.  -> it was replaced with new chest tube ;   Pulmonology contacted cardiothoracic surgery for possible transfer to [facility]; recommendation was to place pneumostat   - pneumostat placed 9/17, awaiting repeat CXR     Chronic diastolic CHF  last echo as above, from 5/6/22 EF 55%  -BNP mildly elevated  - pt was taken of diuretics recently for weight loss   - monitor for fluid overload and resume lasix as needed only      S.p recent L1 burst fracture   -  s/p T11-L3 PDFF with L1 corpectomy at  ( 8/22)     DM type 2  -Has been taken off diuretics and diabetic meds for progressive weight loss   - hyperglycemia with steroids  -SSI  -continue to monitor BG     HTN  -BP stable  -on nadolol     HLD  -continue statin     Hypothyroidism  -continue synthroid     Iron Deficiency Anemia  - Recent Hemorrhoidal bleed -recent colonoscopy neg  - hb at 8 .4  -continue PO iron  -Hgb stable     Depression  Anxiety  -continue zoloft  -prn ativan     Diarrhea  -Imodium as needed  Cut down intake of tomato soup  Diarrhea resolved        DVT Prophylaxis: Lovenox  Diet: ADULT DIET; Regular; 4 carb choices (60 gm/meal)  ADULT ORAL NUTRITION SUPPLEMENT; Breakfast, Dinner, Lunch;  Low Calorie/High Protein Oral Supplement  Code Status: Full Code  PT/OT Eval Status: ordered    Sylvia Baeza MD

## 2022-09-17 NOTE — FLOWSHEET NOTE
09/16/22 2057   Vital Signs   Temp 97.7 °F (36.5 °C)   Temp Source Axillary   Heart Rate (!) 105   Heart Rate Source Monitor   Resp 18   /73   BP Method Automatic   MAP (Calculated) 90   Patient Position Semi fowlers   Oxygen Therapy   SpO2 98 %   O2 Device Nasal cannula   O2 Flow Rate (L/min) 10 L/min     Patient with no complaints at this time. Chest tube assessed, breath sounds with rhonchi and wheezing. Patient currently in bed with cross- stitch and denies need at this time. On 6LNC and 02 sat is 98%.   Call light within reach if needed

## 2022-09-17 NOTE — PROGRESS NOTES
Pt a/o. Am assessment completed see flow sheet. Pt denies pain at this time. Pt states she would like to get up to bsc with stedy. PCA notified. Pt 97% on 3L O2. Per report pt had been on 10L, this was incorrect. Pt on 3L overnight with no worsening O2 requirements. No other needs voiced at this time. Bed locked in lowest position with bed alarm on. Call light within reach.

## 2022-09-17 NOTE — PROGRESS NOTES
4mg PRN morphine given for right sided chest/back pain rated 7/10. No further needs voiced at this time.

## 2022-09-17 NOTE — PROGRESS NOTES
09/17/22 0200   RT Protocol   History Pulmonary Disease 2   Respiratory pattern 2   Breath sounds 2   Cough 0   Indications for Bronchodilator Therapy Decreased or absent breath sounds   Bronchodilator Assessment Score 6   RT Inhaler-Nebulizer Bronchodilator Protocol Note    There is a bronchodilator order in the chart from a provider indicating to follow the RT Bronchodilator Protocol and there is an Initiate RT Inhaler-Nebulizer Bronchodilator Protocol order as well (see protocol at bottom of note). CXR Findings:  XR CHEST PORTABLE    Result Date: 9/16/2022  New right chest tube in place, resolution of right pneumothorax     XR CHEST PORTABLE    Result Date: 9/15/2022  Slight interval retraction of right pleural catheter with increasing volume of right pneumothorax. Replacement or repositioning of the pleural catheter is advised. Findings were called at 8:09 a.m. .  At 8:24 a.m., findings were discussed with Ivette Brown RN, on 9/15/2022. The findings from the last RT Protocol Assessment were as follows:   History Pulmonary Disease: Chronic pulmonary disease  Respiratory Pattern: Dyspnea on exertion or RR 21-25 bpm  Breath Sounds: Slightly diminished and/or crackles  Cough: Strong, spontaneous, non-productive  Indication for Bronchodilator Therapy: Decreased or absent breath sounds  Bronchodilator Assessment Score: 6    Aerosolized bronchodilator medication orders have been revised according to the RT Inhaler-Nebulizer Bronchodilator Protocol below. Respiratory Therapist to perform RT Therapy Protocol Assessment initially then follow the protocol. Repeat RT Therapy Protocol Assessment PRN for score 0-3 or on second treatment, BID, and PRN for scores above 3. No Indications - adjust the frequency to every 6 hours PRN wheezing or bronchospasm, if no treatments needed after 48 hours then discontinue using Per Protocol order mode.      If indication present, adjust the RT bronchodilator orders based on the Bronchodilator Assessment Score as indicated below. Use Inhaler orders unless patient has one or more of the following: on home nebulizer, not able to hold breath for 10 seconds, is not alert and oriented, cannot activate and use MDI correctly, or respiratory rate 25 breaths per minute or more, then use the equivalent nebulizer order(s) with same Frequency and PRN reasons based on the score. If a patient is on this medication at home then do not decrease Frequency below that used at home. 0-3 - enter or revise RT bronchodilator order(s) to equivalent RT Bronchodilator order with Frequency of every 4 hours PRN for wheezing or increased work of breathing using Per Protocol order mode. 4-6 - enter or revise RT Bronchodilator order(s) to two equivalent RT bronchodilator orders with one order with BID Frequency and one order with Frequency of every 4 hours PRN wheezing or increased work of breathing using Per Protocol order mode. 7-10 - enter or revise RT Bronchodilator order(s) to two equivalent RT bronchodilator orders with one order with TID Frequency and one order with Frequency of every 4 hours PRN wheezing or increased work of breathing using Per Protocol order mode. 11-13 - enter or revise RT Bronchodilator order(s) to one equivalent RT bronchodilator order with QID Frequency and an Albuterol order with Frequency of every 4 hours PRN wheezing or increased work of breathing using Per Protocol order mode. Greater than 13 - enter or revise RT Bronchodilator order(s) to one equivalent RT bronchodilator order with every 4 hours Frequency and an Albuterol order with Frequency of every 2 hours PRN wheezing or increased work of breathing using Per Protocol order mode.          Electronically signed by Mihaela Rodrigez RCP on 9/17/2022 at 2:44 AM

## 2022-09-17 NOTE — PROGRESS NOTES
Pulmonary & Critical Care Medicine Progress Note  Hospital Day: 9   CC: COVID, pneumothorax    Subjective:   Had some anterior chest pain overnight, CXR was unremarkable. Remains stable on baseline 3 L. Pneumostat placed this AM, no air leak in reservoir. IV: peripheral    PHYSICAL EXAM:  Blood pressure 123/66, pulse (!) 108, temperature 98.8 °F (37.1 °C), temperature source Oral, resp. rate 20, height 5' 3\" (1.6 m), weight 107 lb 6 oz (48.7 kg), SpO2 95 %, not currently breastfeeding. on 3 L   General: ill appearing    Eyes: PERRL. No sclera icterus. No conjunctival injection. ENT: No discharge. Pharynx clear. Neck: Trachea midline. Normal thyroid. Resp: No accessory muscle use. + crackles. No wheezing. No rhonchi. No dullness on percussion. Right anterior chest tube, no airleak in pneumostat. CV: Regular rate. Regular rhythm. No mumur or rub. No edema. Peripheral pulses are 2+. Capillary refill is less than 3 seconds. GI: Non-tender. Non-distended. No masses. No organomegaly. Normal bowel sounds. No hernia. Skin: Warm and dry. No nodule on exposed extremities. No rash on exposed extremities. Lymph: No cervical LAD. No supraclavicular LAD. M/S: No cyanosis. No joint deformity. No clubbing. Neuro: A&OX3.  Patellar reflexes are symmetric  Psych: No agitation, no anxiety, affect is tearful    Scheduled Meds:   enoxaparin  30 mg SubCUTAneous Daily    benzonatate  100 mg Oral TID    albuterol sulfate HFA  2 puff Inhalation TID    mupirocin   Nasal BID    dexamethasone  6 mg Oral Daily    acetaminophen  1,000 mg Oral Once    atorvastatin  40 mg Oral Daily    guaiFENesin  1,200 mg Oral QAM    levothyroxine  125 mcg Oral Daily    magnesium oxide  400 mg Oral Daily    nadolol  20 mg Oral Daily    pantoprazole  40 mg Oral QAM AC    insulin lispro  0-4 Units SubCUTAneous TID WC    insulin lispro  0-4 Units SubCUTAneous Nightly    sodium chloride flush  5-40 mL IntraVENous 2 times per day    sertraline 200 mg Oral Daily    Pirfenidone  3 tablet Oral TID     Data:  CBC:   Recent Labs     09/15/22  0431 09/16/22  0427   WBC 12.0* 11.6*   HGB 9.8* 9.9*   HCT 31.7* 31.0*   MCV 82.9 82.0    314     BMP:   Recent Labs     09/15/22  0431 09/16/22  0426    137   K 4.0 3.8    99   CO2 31 31   BUN 13 14   CREATININE <0.5* <0.5*     LIVER PROFILE:   No results for input(s): AST, ALT, LIPASE, BILIDIR, BILITOT, ALKPHOS in the last 72 hours. Invalid input(s): AMYLASE,  ALB    Microbiology:  9/9/2022 SARS-CoV-2 POSITIVE  and influenza negative  9/9/2022 blood NGTD    Imaging:  CTPA 9/9/22:  bullous emphysema, pulmonary fibrosis, right-sided pneumothorax    pCXR 9/11/22 AM:  small right apical pneumothorax, possible retraction of chest tube    pCXR 9/13/22 AM:  Worsening small to moderate right apical pneumothorax.   (found tube had been clamped)     pCXR 9/13/22 After unclamping:  Complete resolution of the previously noted right apical pneumothorax. pCXR 9/14/22 AM:  No pneumothorax on the right. Pleural drainage catheter seen on the right. Fibrotic changes in the lungs, similar to prior     pCXR 9/14/22 with tube clamped 4 hrs:  small apical PTX     pCXR 9/15/22 AM:  Slight interval retraction of right pleural catheter with increasing volume of right pneumothorax. Replacement or repositioning of the pleural catheter is advised. pCXR 9/16/22 AM:  New right chest tube in place. Resolution of right  pneumothorax. Soft tissue emphysema again seen in the right chest wall. No new airspace disease.     pCXR 9/17/22 AM:  Similar appearing chest.     ASSESSMENT:  Acute on chronic hypoxemic respiratory failure, baseline 3 L O2  COVID-19 pneumonia  Secondary right-sided spontaneous pneumothorax   Right chest tube #1 9/9/22 - 9/15/22  Right chest tube #2 9/15/22   ILD, h/o  & NSIP v chronic HP now with acute exacerbation   Severe bullous changes/paraseptal emphysema - no tobacco history   Chronic diastolic CHF  R88-W1 PDFF withL1 corpectomy @  8/22/22 for L1 burst fracture      PLAN:  - COVID-19 isolation, droplet plus  -  Discontinue  Supplemental oxygen to maintain SaO2 >92%; wean as tolerated   Okay to stop decadron, patient has declined to take  Home Pirfenidone, monitoring for toxicity   Inhaled bronchodilators   Chest tube to Pneumostat Heimlich valve  OOB with assistance. PT/OT  F/U CXR     I spent a total of 14 minutes on this encounter personally obtaining the patient history, reviewing labs, imaging and other data. I independently performed the above physical exam and updated this encounter note, assessment and plan as needed. Piyush Medina MD on 9/17/22 at 12:29 PM EDT    I spent a total of 12 minutes on this encounter on chart review, history taking and review of data. I independently performed a physical exam and updated this encounter note as needed.    [facility], [facility] on 9/17/22 at 7:50 AM EDT

## 2022-09-17 NOTE — PROGRESS NOTES
Patient called out with increase chest pain. Chest tube checked, small amount of crepitous around insertion site.   Patient requiring 10liters Nasal cannula, 02 sat between 93-95%  Radiology called for stat chest x-ray  Respiratory therapy called for evaluation for high flow oxygen, considering increased need for nasal cannula oxygen

## 2022-09-17 NOTE — PROGRESS NOTES
This Rt was told (By aide), that pt. Was short of breath. Pt. Was sitting up in chair, Spo2 was 97% and Oxygen had been increased to 7L. Pt gets very anxious at times.

## 2022-09-18 ENCOUNTER — APPOINTMENT (OUTPATIENT)
Dept: GENERAL RADIOLOGY | Age: 67
DRG: 177 | End: 2022-09-18
Payer: MEDICARE

## 2022-09-18 LAB
ANION GAP SERPL CALCULATED.3IONS-SCNC: 13 MMOL/L (ref 3–16)
BASOPHILS ABSOLUTE: 0.1 K/UL (ref 0–0.2)
BASOPHILS RELATIVE PERCENT: 0.9 %
BUN BLDV-MCNC: 11 MG/DL (ref 7–20)
CALCIUM SERPL-MCNC: 8.4 MG/DL (ref 8.3–10.6)
CHLORIDE BLD-SCNC: 99 MMOL/L (ref 99–110)
CO2: 25 MMOL/L (ref 21–32)
CREAT SERPL-MCNC: <0.5 MG/DL (ref 0.6–1.2)
EOSINOPHILS ABSOLUTE: 0.8 K/UL (ref 0–0.6)
EOSINOPHILS RELATIVE PERCENT: 7.5 %
GFR AFRICAN AMERICAN: >60
GFR NON-AFRICAN AMERICAN: >60
GLUCOSE BLD-MCNC: 141 MG/DL (ref 70–99)
GLUCOSE BLD-MCNC: 155 MG/DL (ref 70–99)
GLUCOSE BLD-MCNC: 168 MG/DL (ref 70–99)
GLUCOSE BLD-MCNC: 207 MG/DL (ref 70–99)
GLUCOSE BLD-MCNC: 89 MG/DL (ref 70–99)
HCT VFR BLD CALC: 28.4 % (ref 36–48)
HEMOGLOBIN: 9 G/DL (ref 12–16)
LYMPHOCYTES ABSOLUTE: 2.6 K/UL (ref 1–5.1)
LYMPHOCYTES RELATIVE PERCENT: 24.2 %
MCH RBC QN AUTO: 26 PG (ref 26–34)
MCHC RBC AUTO-ENTMCNC: 31.7 G/DL (ref 31–36)
MCV RBC AUTO: 82 FL (ref 80–100)
MONOCYTES ABSOLUTE: 0.7 K/UL (ref 0–1.3)
MONOCYTES RELATIVE PERCENT: 6.7 %
NEUTROPHILS ABSOLUTE: 6.5 K/UL (ref 1.7–7.7)
NEUTROPHILS RELATIVE PERCENT: 60.7 %
PDW BLD-RTO: 16.3 % (ref 12.4–15.4)
PERFORMED ON: ABNORMAL
PERFORMED ON: NORMAL
PLATELET # BLD: 245 K/UL (ref 135–450)
PMV BLD AUTO: 7.1 FL (ref 5–10.5)
POTASSIUM SERPL-SCNC: 3.6 MMOL/L (ref 3.5–5.1)
RBC # BLD: 3.46 M/UL (ref 4–5.2)
SODIUM BLD-SCNC: 137 MMOL/L (ref 136–145)
WBC # BLD: 10.6 K/UL (ref 4–11)

## 2022-09-18 PROCEDURE — 94640 AIRWAY INHALATION TREATMENT: CPT

## 2022-09-18 PROCEDURE — 6360000002 HC RX W HCPCS

## 2022-09-18 PROCEDURE — 6370000000 HC RX 637 (ALT 250 FOR IP): Performed by: INTERNAL MEDICINE

## 2022-09-18 PROCEDURE — 2700000000 HC OXYGEN THERAPY PER DAY

## 2022-09-18 PROCEDURE — 85025 COMPLETE CBC W/AUTO DIFF WBC: CPT

## 2022-09-18 PROCEDURE — 99232 SBSQ HOSP IP/OBS MODERATE 35: CPT | Performed by: INTERNAL MEDICINE

## 2022-09-18 PROCEDURE — 97116 GAIT TRAINING THERAPY: CPT

## 2022-09-18 PROCEDURE — 80048 BASIC METABOLIC PNL TOTAL CA: CPT

## 2022-09-18 PROCEDURE — 2580000003 HC RX 258: Performed by: INTERNAL MEDICINE

## 2022-09-18 PROCEDURE — 6360000002 HC RX W HCPCS: Performed by: INTERNAL MEDICINE

## 2022-09-18 PROCEDURE — 2060000000 HC ICU INTERMEDIATE R&B

## 2022-09-18 PROCEDURE — 71045 X-RAY EXAM CHEST 1 VIEW: CPT

## 2022-09-18 PROCEDURE — 97530 THERAPEUTIC ACTIVITIES: CPT

## 2022-09-18 PROCEDURE — 94761 N-INVAS EAR/PLS OXIMETRY MLT: CPT

## 2022-09-18 PROCEDURE — 36415 COLL VENOUS BLD VENIPUNCTURE: CPT

## 2022-09-18 RX ADMIN — SODIUM CHLORIDE, PRESERVATIVE FREE 10 ML: 5 INJECTION INTRAVENOUS at 09:06

## 2022-09-18 RX ADMIN — ENOXAPARIN SODIUM 30 MG: 100 INJECTION SUBCUTANEOUS at 09:06

## 2022-09-18 RX ADMIN — NADOLOL 20 MG: 40 TABLET ORAL at 09:04

## 2022-09-18 RX ADMIN — GUAIFENESIN 1200 MG: 600 TABLET, EXTENDED RELEASE ORAL at 09:01

## 2022-09-18 RX ADMIN — MORPHINE SULFATE 4 MG: 4 INJECTION, SOLUTION INTRAMUSCULAR; INTRAVENOUS at 15:05

## 2022-09-18 RX ADMIN — SERTRALINE 200 MG: 100 TABLET, FILM COATED ORAL at 09:04

## 2022-09-18 RX ADMIN — PIRFENIDONE 3 TABLET: 267 TABLET, FILM COATED ORAL at 19:59

## 2022-09-18 RX ADMIN — SODIUM CHLORIDE, PRESERVATIVE FREE 10 ML: 5 INJECTION INTRAVENOUS at 19:59

## 2022-09-18 RX ADMIN — MORPHINE SULFATE 4 MG: 4 INJECTION, SOLUTION INTRAMUSCULAR; INTRAVENOUS at 22:35

## 2022-09-18 RX ADMIN — BENZONATATE 100 MG: 100 CAPSULE ORAL at 19:59

## 2022-09-18 RX ADMIN — ALBUTEROL SULFATE 2.5 MG: 2.5 SOLUTION RESPIRATORY (INHALATION) at 07:52

## 2022-09-18 RX ADMIN — MORPHINE SULFATE 4 MG: 4 INJECTION, SOLUTION INTRAMUSCULAR; INTRAVENOUS at 03:41

## 2022-09-18 RX ADMIN — ATORVASTATIN CALCIUM 40 MG: 40 TABLET, FILM COATED ORAL at 09:02

## 2022-09-18 RX ADMIN — ALBUTEROL SULFATE 2.5 MG: 2.5 SOLUTION RESPIRATORY (INHALATION) at 19:41

## 2022-09-18 RX ADMIN — PANTOPRAZOLE SODIUM 40 MG: 40 TABLET, DELAYED RELEASE ORAL at 05:33

## 2022-09-18 RX ADMIN — DEXAMETHASONE 6 MG: 4 TABLET ORAL at 09:03

## 2022-09-18 RX ADMIN — ALBUTEROL SULFATE 2.5 MG: 2.5 SOLUTION RESPIRATORY (INHALATION) at 14:02

## 2022-09-18 RX ADMIN — LEVOTHYROXINE SODIUM 125 MCG: 125 TABLET ORAL at 05:33

## 2022-09-18 ASSESSMENT — PAIN SCALES - GENERAL
PAINLEVEL_OUTOF10: 9
PAINLEVEL_OUTOF10: 4

## 2022-09-18 ASSESSMENT — PAIN DESCRIPTION - DESCRIPTORS
DESCRIPTORS: PRESSURE
DESCRIPTORS: STABBING

## 2022-09-18 ASSESSMENT — PAIN DESCRIPTION - ORIENTATION
ORIENTATION: RIGHT
ORIENTATION: RIGHT

## 2022-09-18 ASSESSMENT — PAIN DESCRIPTION - LOCATION
LOCATION: CHEST;HEAD
LOCATION: BACK
LOCATION: CHEST;SHOULDER

## 2022-09-18 NOTE — PROGRESS NOTES
RT Inhaler-Nebulizer Bronchodilator Protocol Note    There is a bronchodilator order in the chart from a provider indicating to follow the RT Bronchodilator Protocol and there is an Initiate RT Inhaler-Nebulizer Bronchodilator Protocol order as well (see protocol at bottom of note). CXR Findings:  XR CHEST PORTABLE    Result Date: 9/18/2022  Near complete resolution of right apical pneumothorax. Diffuse interstitial changes are otherwise noted throughout the lungs. XR CHEST PORTABLE    Result Date: 9/17/2022  New moderate right pneumothorax. The right chest tube is now more inferiorly located than before. The chest appears otherwise stable. XR CHEST PORTABLE    Result Date: 9/17/2022  Similar appearing chest.     XR CHEST PORTABLE    Result Date: 9/17/2022  No right-sided pneumothorax, within indwelling chest tube noted. Extensive interstitial infiltrates are seen, similar to the prior exam.       The findings from the last RT Protocol Assessment were as follows:   History Pulmonary Disease: Chronic pulmonary disease  Respiratory Pattern: Dyspnea on exertion or RR 21-25 bpm  Breath Sounds: Slightly diminished and/or crackles  Cough: Strong, spontaneous, non-productive  Indication for Bronchodilator Therapy: Decreased or absent breath sounds  Bronchodilator Assessment Score: 6    Aerosolized bronchodilator medication orders have been revised according to the RT Inhaler-Nebulizer Bronchodilator Protocol below. Respiratory Therapist to perform RT Therapy Protocol Assessment initially then follow the protocol. Repeat RT Therapy Protocol Assessment PRN for score 0-3 or on second treatment, BID, and PRN for scores above 3. No Indications - adjust the frequency to every 6 hours PRN wheezing or bronchospasm, if no treatments needed after 48 hours then discontinue using Per Protocol order mode.      If indication present, adjust the RT bronchodilator orders based on the Bronchodilator Assessment Score as indicated below. Use Inhaler orders unless patient has one or more of the following: on home nebulizer, not able to hold breath for 10 seconds, is not alert and oriented, cannot activate and use MDI correctly, or respiratory rate 25 breaths per minute or more, then use the equivalent nebulizer order(s) with same Frequency and PRN reasons based on the score. If a patient is on this medication at home then do not decrease Frequency below that used at home. 0-3 - enter or revise RT bronchodilator order(s) to equivalent RT Bronchodilator order with Frequency of every 4 hours PRN for wheezing or increased work of breathing using Per Protocol order mode. 4-6 - enter or revise RT Bronchodilator order(s) to two equivalent RT bronchodilator orders with one order with BID Frequency and one order with Frequency of every 4 hours PRN wheezing or increased work of breathing using Per Protocol order mode. 7-10 - enter or revise RT Bronchodilator order(s) to two equivalent RT bronchodilator orders with one order with TID Frequency and one order with Frequency of every 4 hours PRN wheezing or increased work of breathing using Per Protocol order mode. 11-13 - enter or revise RT Bronchodilator order(s) to one equivalent RT bronchodilator order with QID Frequency and an Albuterol order with Frequency of every 4 hours PRN wheezing or increased work of breathing using Per Protocol order mode. Greater than 13 - enter or revise RT Bronchodilator order(s) to one equivalent RT bronchodilator order with every 4 hours Frequency and an Albuterol order with Frequency of every 2 hours PRN wheezing or increased work of breathing using Per Protocol order mode.        Electronically signed by Joya Denis RCP on 9/18/2022 at 7:44 PM

## 2022-09-18 NOTE — PROGRESS NOTES
Hospitalist Progress Note      PCP: Fortunato Napier MD    Date of Admission: 9/9/2022    Subjective: SOB slightly better, still with cough and sputum production, isnt having a good appetite - not eating much per RN    Medications:  Reviewed    Infusion Medications    dextrose      sodium chloride       Scheduled Medications    albuterol  2.5 mg Nebulization TID    enoxaparin  30 mg SubCUTAneous Daily    benzonatate  100 mg Oral TID    dexamethasone  6 mg Oral Daily    acetaminophen  1,000 mg Oral Once    atorvastatin  40 mg Oral Daily    guaiFENesin  1,200 mg Oral QAM    levothyroxine  125 mcg Oral Daily    magnesium oxide  400 mg Oral Daily    nadolol  20 mg Oral Daily    pantoprazole  40 mg Oral QAM AC    insulin lispro  0-4 Units SubCUTAneous TID WC    insulin lispro  0-4 Units SubCUTAneous Nightly    sodium chloride flush  5-40 mL IntraVENous 2 times per day    sertraline  200 mg Oral Daily    Pirfenidone  3 tablet Oral TID     PRN Meds: benzonatate, loperamide, guaiFENesin-dextromethorphan, albuterol sulfate HFA, hydrOXYzine HCl, glucose, dextrose bolus **OR** dextrose bolus, glucagon (rDNA), dextrose, sodium chloride flush, sodium chloride, ondansetron **OR** ondansetron, polyethylene glycol, acetaminophen **OR** acetaminophen, morphine, morphine, mirtazapine, methocarbamol      Intake/Output Summary (Last 24 hours) at 9/18/2022 1643  Last data filed at 9/17/2022 2337  Gross per 24 hour   Intake --   Output 150 ml   Net -150 ml       Physical Exam Performed:    /60   Pulse 51   Temp 97.5 °F (36.4 °C) (Oral)   Resp 18   Ht 5' 3\" (1.6 m)   Wt 109 lb 3.2 oz (49.5 kg)   SpO2 94%   BMI 19.34 kg/m²     Gen: No distress. Alert. Awake, oriented . Thin , elderly female . Looks ill , fatigued   Eyes: PERRL. No sclera icterus. No conjunctival injection. ENT: No discharge. Pharynx clear. Neck: No JVD. Trachea midline. Resp: No accessory muscle use. Diminished breath sounds . No crackles.  No wheezes. No rhonchi. R chest tube to suction   CV: Regular rate. Regular rhythm. No murmur. No rub. No edema. Capillary Refill: Brisk,< 3 seconds   Peripheral Pulses: +2 palpable, equal bilaterally   GI: Non-tender. Non-distended. Normal bowel sounds. Skin: Warm and dry. No nodule on exposed extremities. No rash on exposed extremities. M/S: No cyanosis. No joint deformity. No clubbing. Neuro: Awake. Grossly nonfocal    Psych: Oriented x 3. No anxiety or agitation. Labs:   Recent Labs     09/16/22  0427 09/18/22  1014   WBC 11.6* 10.6   HGB 9.9* 9.0*   HCT 31.0* 28.4*    245     Recent Labs     09/16/22  0426 09/18/22  1014    137   K 3.8 3.6   CL 99 99   CO2 31 25   BUN 14 11   CREATININE <0.5* <0.5*   CALCIUM 8.6 8.4     No results for input(s): AST, ALT, BILIDIR, BILITOT, ALKPHOS in the last 72 hours. No results for input(s): INR in the last 72 hours. No results for input(s): [place] in the last 72 hours. Urinalysis:      Lab Results   Component Value Date/Time    NITRU Negative 09/09/2022 05:49 PM    WBCUA 3-5 05/28/2022 06:39 AM    BACTERIA Rare 05/28/2022 06:39 AM    RBCUA 0-2 05/28/2022 06:39 AM    BLOODU Negative 09/09/2022 05:49 PM    SPECGRAV 1.015 09/09/2022 05:49 PM    GLUCOSEU Negative 09/09/2022 05:49 PM       Radiology:  XR CHEST PORTABLE   Final Result   Near complete resolution of right apical pneumothorax. Diffuse interstitial   changes are otherwise noted throughout the lungs. XR CHEST PORTABLE   Final Result   New moderate right pneumothorax. The right chest tube is now more inferiorly   located than before. The chest appears otherwise stable. XR CHEST PORTABLE   Final Result   No right-sided pneumothorax, within indwelling chest tube noted.       Extensive interstitial infiltrates are seen, similar to the prior exam.         XR CHEST PORTABLE   Final Result   Similar appearing chest.         XR CHEST PORTABLE   Final Result   New right chest tube in place, resolution of right pneumothorax         CT GUIDED CHEST TUBE   Final Result   Successful CT guided placement of a right chest tube. CT GUIDED NEEDLE PLACEMENT   Final Result   Successful CT guided placement of a right chest tube. XR CHEST PORTABLE   Final Result   Slight interval retraction of right pleural catheter with increasing volume   of right pneumothorax. Replacement or repositioning of the pleural catheter   is advised. Findings were called at 8:09 a.m. .  At 8:24 a.m., findings were discussed   with Shanti Wilkins RN, on 9/15/2022. XR CHEST PORTABLE   Final Result   No change position of the right chest tube with a new small right apical   pneumothorax and new subcutaneous air along the right chest wall. Some of   the holes in the pigtail catheter are within the chest wall. Otherwise stable appearance of chest with diffuse interstitial prominence. XR CHEST PORTABLE   Final Result   No pneumothorax on the right. Pleural drainage catheter seen on the right      Fibrotic changes in the lungs, similar to prior         XR CHEST PORTABLE   Final Result   1. Complete resolution of the previously noted right apical pneumothorax. XR CHEST PORTABLE   Final Result   1. Worsening small to moderate right apical pneumothorax. XR CHEST PORTABLE   Final Result   Diffuse bilateral honeycomb lung disease. No pneumothorax evident. Pigtail   partially in the right pleural space. XR CHEST PORTABLE   Final Result   Partial retraction of right chest tube. Distal side port may be extra   thoracic. There is perhaps minimal subcutaneous air. Small right apical pneumothorax, decreased in the interval.      Stable extensive interstitial pattern. XR CHEST PORTABLE   Final Result   Increased size of right-sided pneumothorax measuring 1.6 cm with stable   positioning of the right apically oriented pigtail thoracostomy tube. The findings were sent to the [facility] Results Communication Team at 15:57 on   9/10/2022 to be communicated to a licensed caregiver. XR CHEST PORTABLE   Final Result   Right chest tube is in normal position. No pneumothorax is evident. Interstitial opacities, correlating with extensive emphysematous disease. XR CHEST PORTABLE   Final Result   Interval placement of a locking loop chest tube at the right lung apex with   resolved pneumothorax. CT CHEST PULMONARY EMBOLISM W CONTRAST   Final Result   No evidence of pulmonary embolism      Right apical, anterior and lateral pneumothorax approximately 30%. Chest tube   extends along the right lateral chest wall however tip is extrapleural and   lateral to the upper ribcage      Coronary artery/atherosclerotic disease. Cardiomegaly      Extensive bullous changes and pulmonary fibrosis      Small right pleural effusion      RECOMMENDATIONS:   Re-evaluate for reinsertion of right chest tube         XR CHEST PORTABLE   Final Result   Nearly completely resolved right pneumothorax. Chest tube along the right   lateral hemithorax however tip appears extrapleural.         XR CHEST PORTABLE   Final Result   1. Moderate right-sided pneumothorax with approximately 4 cm of pleural   separation at the lung apex. No definite evidence of tension morphology at   this time.       2.  Chronic interstitial lung disease         XR CHEST PORTABLE    (Results Pending)           Assessment/Plan:    Active Hospital Problems    Diagnosis     COVID-19 [U07.1]      Priority: Medium    Secondary spontaneous pneumothorax [J93.12]      Priority: Medium    Pneumonia due to COVID-19 virus [U07.1, J12.82]      Priority: Medium    Primary spontaneous pneumothorax [J93.11]      Priority: Medium    Acute respiratory failure with hypoxia (HCC) [J96.01]     Acute on chronic respiratory failure with hypoxemia (HCC) [J96.21]             Acute on chronic respiratory failure  Hx of Status: ordered    Jeff Narvaez MD

## 2022-09-18 NOTE — PROGRESS NOTES
not alert and oriented, cannot activate and use MDI correctly, or respiratory rate 25 breaths per minute or more, then use the equivalent nebulizer order(s) with same Frequency and PRN reasons based on the score. If a patient is on this medication at home then do not decrease Frequency below that used at home. 0-3 - enter or revise RT bronchodilator order(s) to equivalent RT Bronchodilator order with Frequency of every 4 hours PRN for wheezing or increased work of breathing using Per Protocol order mode. 4-6 - enter or revise RT Bronchodilator order(s) to two equivalent RT bronchodilator orders with one order with BID Frequency and one order with Frequency of every 4 hours PRN wheezing or increased work of breathing using Per Protocol order mode. 7-10 - enter or revise RT Bronchodilator order(s) to two equivalent RT bronchodilator orders with one order with TID Frequency and one order with Frequency of every 4 hours PRN wheezing or increased work of breathing using Per Protocol order mode. 11-13 - enter or revise RT Bronchodilator order(s) to one equivalent RT bronchodilator order with QID Frequency and an Albuterol order with Frequency of every 4 hours PRN wheezing or increased work of breathing using Per Protocol order mode. Greater than 13 - enter or revise RT Bronchodilator order(s) to one equivalent RT bronchodilator order with every 4 hours Frequency and an Albuterol order with Frequency of every 2 hours PRN wheezing or increased work of breathing using Per Protocol order mode. RT to enter RT Home Evaluation for COPD & MDI Assessment order using Per Protocol order mode.     Electronically signed by Smiley Mahoney RCP on 9/18/2022 at 7:55 AM

## 2022-09-18 NOTE — FLOWSHEET NOTE
09/18/22 0340   Vital Signs   Temp 97 °F (36.1 °C)   Temp Source Oral   Heart Rate 75   Heart Rate Source Monitor   Resp 20   /69   BP Location Right upper arm   MAP (Calculated) 82.33   Level of Consciousness 0   MEWS Score 1   Oxygen Therapy   SpO2 96 %   Pulse Oximeter Device Mode Intermittent   Pulse Oximeter Device Location Finger   O2 Device Nasal cannula   O2 Flow Rate (L/min) 4 L/min     Pt awake in bed. VS as shown above. Pt denies any further needs at this time. Call light in reach and bed in lowest position.

## 2022-09-18 NOTE — PROGRESS NOTES
Patient assessed and AM medications explained. Patient is refusing several medications. Reports too difficult to take. Educated on importance of medications. Explained that we must take them in a timely manner due to timing of next dose. Patient denies any further needs at this time. Call light within reach.

## 2022-09-18 NOTE — PROGRESS NOTES
Pulmonary & Critical Care Medicine Progress Note  Hospital Day: 10   CC: COVID, pneumothorax    Subjective: Failed pneumostat trial yesterday. Residual apical pneumo on AM CXR. Working with PT/OT. Feeling OK. IV: peripheral    PHYSICAL EXAM:  Blood pressure 109/69, pulse 73, temperature 97.3 °F (36.3 °C), temperature source Oral, resp. rate 20, height 5' 3\" (1.6 m), weight 109 lb 3.2 oz (49.5 kg), SpO2 94 %, not currently breastfeeding. on 3 L   Constitutional:  No acute distress. HENT:  Oropharynx is clear and moist.   Neck: No tracheal deviation present. Cardiovascular: Normal heart sounds. No lower extremity edema. Pulmonary/Chest: No wheezes. No rhonchi. No rales. No decreased breath sounds. No accessory muscle usage or stridor. Anterior chest tube has very minimal airleak while on suction  Musculoskeletal: No cyanosis. No clubbing. Skin: Skin is warm and dry. Psychiatric: Normal mood and affect.   Neurologic: speech fluent, alert and oriented, strength symmetric      Scheduled Meds:   albuterol  2.5 mg Nebulization TID    enoxaparin  30 mg SubCUTAneous Daily    benzonatate  100 mg Oral TID    dexamethasone  6 mg Oral Daily    acetaminophen  1,000 mg Oral Once    atorvastatin  40 mg Oral Daily    guaiFENesin  1,200 mg Oral QAM    levothyroxine  125 mcg Oral Daily    magnesium oxide  400 mg Oral Daily    nadolol  20 mg Oral Daily    pantoprazole  40 mg Oral QAM AC    insulin lispro  0-4 Units SubCUTAneous TID WC    insulin lispro  0-4 Units SubCUTAneous Nightly    sodium chloride flush  5-40 mL IntraVENous 2 times per day    sertraline  200 mg Oral Daily    Pirfenidone  3 tablet Oral TID     Data:  CBC:   Recent Labs     09/16/22 0427   WBC 11.6*   HGB 9.9*   HCT 31.0*   MCV 82.0        BMP:   Recent Labs     09/16/22 0426      K 3.8   CL 99   CO2 31   BUN 14   CREATININE <0.5*     LIVER PROFILE:   No results for input(s): AST, ALT, LIPASE, BILIDIR, BILITOT, ALKPHOS in the last 72 hours. Invalid input(s): AMYLASE,  ALB    Microbiology:  9/9/2022 SARS-CoV-2 POSITIVE  and influenza negative  9/9/2022 blood NGTD    Imaging:  CTPA 9/9/22:  bullous emphysema, pulmonary fibrosis, right-sided pneumothorax    pCXR 9/11/22 AM:  small right apical pneumothorax, possible retraction of chest tube    pCXR 9/13/22 AM:  Worsening small to moderate right apical pneumothorax.   (found tube had been clamped)     pCXR 9/13/22 After unclamping:  Complete resolution of the previously noted right apical pneumothorax. pCXR 9/14/22 AM:  No pneumothorax on the right. Pleural drainage catheter seen on the right. Fibrotic changes in the lungs, similar to prior     pCXR 9/14/22 with tube clamped 4 hrs:  small apical PTX     pCXR 9/15/22 AM:  Slight interval retraction of right pleural catheter with increasing volume of right pneumothorax. Replacement or repositioning of the pleural catheter is advised. pCXR 9/16/22 AM:  New right chest tube in place. Resolution of right  pneumothorax. Soft tissue emphysema again seen in the right chest wall. No new airspace disease. pCXR 9/17/22 AM:  Similar appearing chest.     pCXR 9/17/22 after 3 hour pneumostat trial: new right moderate pneumothorax, chest tube is more inferiorly located than prior    pCXR 9/18/22 AM:  Near complete resolution of right pneumothorax with persistent pleural catheter. Tiny residual apical pneumothorax noted.     ASSESSMENT:  Acute on chronic hypoxemic respiratory failure, baseline 3-4 L O2  COVID-19 pneumonia  Secondary right-sided spontaneous pneumothorax   Right chest tube #1 9/9/22 - 9/15/22  Right chest tube #2 9/15/22   ILD, h/o  & NSIP v chronic HP now with acute exacerbation   Severe bullous changes/paraseptal emphysema - no tobacco history   Chronic diastolic CHF  S26-S5 PDFF withL1 corpectomy @  8/22/22 for L1 burst fracture      PLAN:  Supplemental oxygen to maintain SaO2 >92%; wean as tolerated   S/P 8 days Decadron 6 mg   Home Pirfenidone   Inhaled bronchodilators   Continue chest tube to -20 suction today given incomplete reexpansion. Trial of water seal with improvement, then of Pneumostat Heimlich valve   Daily CXR   OOB with assistance. PT/OT     I spent a total of 11 minutes on this encounter personally obtaining the patient history, reviewing labs, imaging and other data. I independently performed the above physical exam and updated this encounter note, assessment and plan as needed. Salbador Mcdonough MD on 9/18/22 at 12:38 PM EDT    I spent a total of 7 minutes on this encounter on chart review, history taking and review of data. I independently performed a physical exam and updated this encounter note as needed.    Roge Ramos PA-C on 9/18/22 at 6:52 AM EDT

## 2022-09-18 NOTE — PROGRESS NOTES
Pt awake in bed. Assessment completed and medications given per MAR. VS as charted in flowsheet. A&Ox4. Pt denies any further needs at this time. Call light in reach and bed in lowest position.

## 2022-09-18 NOTE — PROGRESS NOTES
Inpatient Physical Therapy Daily Treatment Note    Unit: PCU  Date:  9/18/2022  Patient Name:    Julia Martinez  Admitting diagnosis:  Pneumonia due to COVID-19 virus [U07.1, J12.82]  Admit Date:  9/9/2022  Precautions/Restrictions:  Fall risk, Bed/chair alarm, Lines -IV, Supplemental O2 (3L), and Drains (R chest tube), Telemetry, and Continuous pulse oximetry      Discharge Recommendations: SNF  DME needs for discharge: defer to facility       Therapy recommendation for EMS Transport: can transport by wheelchair    Therapy recommendations for staff:   Assist of 1 with use of rolling walker (RW) for all transfers and ambulation to/from [facility]  to/from chair    History of Present Illness:   Per Dr. Joby Moreira:  Patient is a 69 y.o. female with  cryptongenic organizing pneumonia, pulm fibrosis , chronic hypoxic resp failure , atrial fibrillation, diastolic CHF,  HLD, hypothyroidism, GERD, HLD, depression, anxiety, chronic back pain who presented to ER for increasing sob for last few days     Recently had spine surgery for L1 burst fracture and did rehab at [facility] and went home about a week ago . Reports she developed slow increasing sob and using upto 6 L o2 to keep her spo2 above 90 %. No fevers or chills but has significant cough. Workup in ER showed hypoxia and new right sided pneumothorax and covid positive. Right sided chest tube placed and admitted to ICU for eval      Pt reports she feels fine since her chest tube was placed. Pain controlled. No cough today   Has been taken off diuretics and diabetic meds for progressive weight loss     Home Health S4 Level Recommendation: NA  AM-PAC Mobility Score      AM-PAC Inpatient Mobility without Stair Climbing Raw Score : 14    Treatment Time:  10:25 - 11:10  Treatment number: 2  Timed Code Treatment Minutes: 45 minutes  Total Treatment Minutes:  45  minutes    Cognition    A&O orientation not directly assessed.     Able to follow 2 step commands    Subjective  Patient lying supine in bed with no family present   Pt agreeable to this PT tx. Pain   Yes  Location: chest tube insertion site  Rating:    moderate/10  Pain Medicine Status: Pain med requested and RN notified     Bed Mobility   Supine to Sit:    Min A   Sit to Supine:   Not Tested  Rolling:   CGA  Scooting:   CGA    Transfer Training     Sit to stand:   Min A   Stand to sit:   Min A   Bed to Chair:   Min A  with use of rolling walker (RW)    Gait Training gait completed as indicated below  Distance:      5 ft + 5 ft  Deviations (firm surface/linoleum):  decreased mike, narrow BRY, forward flexed posture, step through pattern, and decreased step length bilaterally  Assistive Device Used:    rolling walker (RW)  Level of Assist:    Min A   Comment: VC's for gait sequencing and RW management    Stair Training deferred, pt unsafe/not appropriate to complete stairs at this time  # of Steps:   N/A  Level of Assist:  Not Tested  UE Support:  NA  Assistive Device:  N/A  Pattern:   N/A  Comments:     Therapeutic Exercise Dimitry deferred secondary to treatment focus on functional mobility  NA    Balance  Sitting:  Good ; SBA  Comments: EOB    Standing: Fair +; CGA and Min A   Comments: with RW    Patient Education      Role of PT, POC, Discharge recommendations, safety awareness, transfer techniques, pursed lip breathing, pacing activity, and calling for assist with mobility. Positioning Needs       Pt reclined in chair, alarm set, positioned in proper neutral alignment and pressure relief provided. Call light provided and all needs within reach    Activity Tolerance   Pt completed therapy session with No adverse symptoms noted w/activity. Sitting on BSC SpO2: 90% on 3L O2    Other    Assessment :  Patient with good tolerance for transfers with RW up to [facility] and chair. Encouraged pt to use RW instead STEDY. Progress gait next visit.   Recommending SNF upon discharge as patient functioning well below baseline, demonstrates good rehab potential and unable to return home due to limited or no family support, inability to negotiate stairs to enter home/bedroom/bathroom, burden of care beyond caregiver ability, home environment not conducive to patient recovery, and limited safety awareness. Goals (all goals ongoing unless otherwise indicated)  To be met in 3 visits:  1). Independent with LE Ex x 10 reps     To be met in 6 visits:  1). Supine to/from sit: Supervision  2). Sit to/from stand: Supervision  3). Bed to chair: Supervision  4). Gait: Ambulate 50 ft.  with  SBA and use of rolling walker (RW)  5). Tolerate B LE exercises 3 sets of 10-15 reps    Plan   Continue with plan of care. Signature: Alfredo Valdovinos, PT, DPT, OMT-C  #[id number]      If patient discharges from this facility prior to next visit, this note will serve as the Discharge Summary.

## 2022-09-18 NOTE — PROGRESS NOTES
Patient sat up in chair for about an hour. Transferred to bedside commode. Primary RN and Charge RN assisted patient back to bed. Patient and family were unhappy stating \"nurses are pushing patient too hard. \"  Primary RN apologized and educated that the refusal of medications, and lack of willingness to work with PT and build strength is only making recovery that much more difficult. Patient refused afternoon medications and requested to be left alone. Refilled patient's ice per request.  Notified charge.

## 2022-09-18 NOTE — PROGRESS NOTES
Spoke with patient about working with PT. Patient does not want to work with PT at this time. States she is too weak and tired. Educated patient on the importance of working with PT to improve strength. Patient remains unwilling to try at this time. PT to check back with patient later, if time allows. Patient was unable to take all of morning medications. Patient did not take Cogard, 1/2 mucinex tabs, 1/2 of Zoloft. Educated patient on importance of these medications. Patient continues to refuse.   RN to notify MD.

## 2022-09-18 NOTE — PROGRESS NOTES
09/17/22 2000   RT Protocol   History Pulmonary Disease 2   Respiratory pattern 2   Breath sounds 2   Cough 0   Indications for Bronchodilator Therapy Decreased or absent breath sounds   Bronchodilator Assessment Score 6   RT Inhaler-Nebulizer Bronchodilator Protocol Note    There is a bronchodilator order in the chart from a provider indicating to follow the RT Bronchodilator Protocol and there is an Initiate RT Inhaler-Nebulizer Bronchodilator Protocol order as well (see protocol at bottom of note). CXR Findings:  XR CHEST PORTABLE    Result Date: 9/17/2022  New moderate right pneumothorax. The right chest tube is now more inferiorly located than before. The chest appears otherwise stable. XR CHEST PORTABLE    Result Date: 9/17/2022  Similar appearing chest.     XR CHEST PORTABLE    Result Date: 9/17/2022  No right-sided pneumothorax, within indwelling chest tube noted. Extensive interstitial infiltrates are seen, similar to the prior exam.     XR CHEST PORTABLE    Result Date: 9/16/2022  New right chest tube in place, resolution of right pneumothorax       The findings from the last RT Protocol Assessment were as follows:   History Pulmonary Disease: Chronic pulmonary disease  Respiratory Pattern: Dyspnea on exertion or RR 21-25 bpm  Breath Sounds: Slightly diminished and/or crackles  Cough: Strong, spontaneous, non-productive  Indication for Bronchodilator Therapy: Decreased or absent breath sounds  Bronchodilator Assessment Score: 6    Aerosolized bronchodilator medication orders have been revised according to the RT Inhaler-Nebulizer Bronchodilator Protocol below. Respiratory Therapist to perform RT Therapy Protocol Assessment initially then follow the protocol. Repeat RT Therapy Protocol Assessment PRN for score 0-3 or on second treatment, BID, and PRN for scores above 3.     No Indications - adjust the frequency to every 6 hours PRN wheezing or bronchospasm, if no treatments needed after 48 hours then discontinue using Per Protocol order mode. If indication present, adjust the RT bronchodilator orders based on the Bronchodilator Assessment Score as indicated below. Use Inhaler orders unless patient has one or more of the following: on home nebulizer, not able to hold breath for 10 seconds, is not alert and oriented, cannot activate and use MDI correctly, or respiratory rate 25 breaths per minute or more, then use the equivalent nebulizer order(s) with same Frequency and PRN reasons based on the score. If a patient is on this medication at home then do not decrease Frequency below that used at home. 0-3 - enter or revise RT bronchodilator order(s) to equivalent RT Bronchodilator order with Frequency of every 4 hours PRN for wheezing or increased work of breathing using Per Protocol order mode. 4-6 - enter or revise RT Bronchodilator order(s) to two equivalent RT bronchodilator orders with one order with BID Frequency and one order with Frequency of every 4 hours PRN wheezing or increased work of breathing using Per Protocol order mode. 7-10 - enter or revise RT Bronchodilator order(s) to two equivalent RT bronchodilator orders with one order with TID Frequency and one order with Frequency of every 4 hours PRN wheezing or increased work of breathing using Per Protocol order mode. 11-13 - enter or revise RT Bronchodilator order(s) to one equivalent RT bronchodilator order with QID Frequency and an Albuterol order with Frequency of every 4 hours PRN wheezing or increased work of breathing using Per Protocol order mode. Greater than 13 - enter or revise RT Bronchodilator order(s) to one equivalent RT bronchodilator order with every 4 hours Frequency and an Albuterol order with Frequency of every 2 hours PRN wheezing or increased work of breathing using Per Protocol order mode.        Electronically signed by Rachel Oliveira RCP on 9/17/2022 at 8:28 PM

## 2022-09-19 ENCOUNTER — APPOINTMENT (OUTPATIENT)
Dept: GENERAL RADIOLOGY | Age: 67
DRG: 177 | End: 2022-09-19
Payer: MEDICARE

## 2022-09-19 LAB
ANION GAP SERPL CALCULATED.3IONS-SCNC: 7 MMOL/L (ref 3–16)
BASOPHILS ABSOLUTE: 0.1 K/UL (ref 0–0.2)
BASOPHILS RELATIVE PERCENT: 0.6 %
BUN BLDV-MCNC: 15 MG/DL (ref 7–20)
CALCIUM SERPL-MCNC: 8.4 MG/DL (ref 8.3–10.6)
CHLORIDE BLD-SCNC: 102 MMOL/L (ref 99–110)
CO2: 30 MMOL/L (ref 21–32)
CREAT SERPL-MCNC: <0.5 MG/DL (ref 0.6–1.2)
EOSINOPHILS ABSOLUTE: 0.2 K/UL (ref 0–0.6)
EOSINOPHILS RELATIVE PERCENT: 1.8 %
GFR AFRICAN AMERICAN: >60
GFR NON-AFRICAN AMERICAN: >60
GLUCOSE BLD-MCNC: 116 MG/DL (ref 70–99)
GLUCOSE BLD-MCNC: 146 MG/DL (ref 70–99)
GLUCOSE BLD-MCNC: 147 MG/DL (ref 70–99)
GLUCOSE BLD-MCNC: 80 MG/DL (ref 70–99)
GLUCOSE BLD-MCNC: 87 MG/DL (ref 70–99)
HCT VFR BLD CALC: 27.6 % (ref 36–48)
HEMOGLOBIN: 8.9 G/DL (ref 12–16)
LYMPHOCYTES ABSOLUTE: 2.3 K/UL (ref 1–5.1)
LYMPHOCYTES RELATIVE PERCENT: 25.4 %
MCH RBC QN AUTO: 26.6 PG (ref 26–34)
MCHC RBC AUTO-ENTMCNC: 32.2 G/DL (ref 31–36)
MCV RBC AUTO: 82.7 FL (ref 80–100)
MONOCYTES ABSOLUTE: 0.8 K/UL (ref 0–1.3)
MONOCYTES RELATIVE PERCENT: 8.4 %
NEUTROPHILS ABSOLUTE: 5.9 K/UL (ref 1.7–7.7)
NEUTROPHILS RELATIVE PERCENT: 63.8 %
PDW BLD-RTO: 16.5 % (ref 12.4–15.4)
PERFORMED ON: ABNORMAL
PERFORMED ON: NORMAL
PLATELET # BLD: 248 K/UL (ref 135–450)
PMV BLD AUTO: 6.8 FL (ref 5–10.5)
POTASSIUM SERPL-SCNC: 3.6 MMOL/L (ref 3.5–5.1)
RBC # BLD: 3.34 M/UL (ref 4–5.2)
SODIUM BLD-SCNC: 139 MMOL/L (ref 136–145)
WBC # BLD: 9.2 K/UL (ref 4–11)

## 2022-09-19 PROCEDURE — 2580000003 HC RX 258: Performed by: INTERNAL MEDICINE

## 2022-09-19 PROCEDURE — 6370000000 HC RX 637 (ALT 250 FOR IP): Performed by: INTERNAL MEDICINE

## 2022-09-19 PROCEDURE — 94761 N-INVAS EAR/PLS OXIMETRY MLT: CPT

## 2022-09-19 PROCEDURE — 94640 AIRWAY INHALATION TREATMENT: CPT

## 2022-09-19 PROCEDURE — 85025 COMPLETE CBC W/AUTO DIFF WBC: CPT

## 2022-09-19 PROCEDURE — 6360000002 HC RX W HCPCS: Performed by: INTERNAL MEDICINE

## 2022-09-19 PROCEDURE — 36415 COLL VENOUS BLD VENIPUNCTURE: CPT

## 2022-09-19 PROCEDURE — 80048 BASIC METABOLIC PNL TOTAL CA: CPT

## 2022-09-19 PROCEDURE — 6360000002 HC RX W HCPCS

## 2022-09-19 PROCEDURE — 99233 SBSQ HOSP IP/OBS HIGH 50: CPT | Performed by: INTERNAL MEDICINE

## 2022-09-19 PROCEDURE — 2060000000 HC ICU INTERMEDIATE R&B

## 2022-09-19 PROCEDURE — 2700000000 HC OXYGEN THERAPY PER DAY

## 2022-09-19 PROCEDURE — 71045 X-RAY EXAM CHEST 1 VIEW: CPT

## 2022-09-19 RX ADMIN — SODIUM CHLORIDE, PRESERVATIVE FREE 10 ML: 5 INJECTION INTRAVENOUS at 08:56

## 2022-09-19 RX ADMIN — GUAIFENESIN 1200 MG: 600 TABLET, EXTENDED RELEASE ORAL at 08:52

## 2022-09-19 RX ADMIN — ENOXAPARIN SODIUM 30 MG: 100 INJECTION SUBCUTANEOUS at 08:55

## 2022-09-19 RX ADMIN — ALBUTEROL SULFATE 2.5 MG: 2.5 SOLUTION RESPIRATORY (INHALATION) at 12:36

## 2022-09-19 RX ADMIN — NADOLOL 20 MG: 40 TABLET ORAL at 08:54

## 2022-09-19 RX ADMIN — DEXAMETHASONE 6 MG: 4 TABLET ORAL at 08:54

## 2022-09-19 RX ADMIN — BENZONATATE 100 MG: 100 CAPSULE ORAL at 14:44

## 2022-09-19 RX ADMIN — SERTRALINE 200 MG: 100 TABLET, FILM COATED ORAL at 08:53

## 2022-09-19 RX ADMIN — PIRFENIDONE 3 TABLET: 267 TABLET, FILM COATED ORAL at 11:53

## 2022-09-19 RX ADMIN — PIRFENIDONE 3 TABLET: 267 TABLET, FILM COATED ORAL at 08:57

## 2022-09-19 RX ADMIN — MORPHINE SULFATE 4 MG: 4 INJECTION, SOLUTION INTRAMUSCULAR; INTRAVENOUS at 03:49

## 2022-09-19 RX ADMIN — LEVOTHYROXINE SODIUM 125 MCG: 125 TABLET ORAL at 05:31

## 2022-09-19 RX ADMIN — ATORVASTATIN CALCIUM 40 MG: 40 TABLET, FILM COATED ORAL at 08:53

## 2022-09-19 RX ADMIN — MORPHINE SULFATE 2 MG: 2 INJECTION, SOLUTION INTRAMUSCULAR; INTRAVENOUS at 10:07

## 2022-09-19 RX ADMIN — BENZONATATE 100 MG: 100 CAPSULE ORAL at 20:07

## 2022-09-19 RX ADMIN — MORPHINE SULFATE 4 MG: 4 INJECTION, SOLUTION INTRAMUSCULAR; INTRAVENOUS at 20:07

## 2022-09-19 RX ADMIN — ALBUTEROL SULFATE 2.5 MG: 2.5 SOLUTION RESPIRATORY (INHALATION) at 21:37

## 2022-09-19 RX ADMIN — ALBUTEROL SULFATE 2.5 MG: 2.5 SOLUTION RESPIRATORY (INHALATION) at 07:47

## 2022-09-19 RX ADMIN — MAGNESIUM GLUCONATE 500 MG ORAL TABLET 400 MG: 500 TABLET ORAL at 08:54

## 2022-09-19 RX ADMIN — PANTOPRAZOLE SODIUM 40 MG: 40 TABLET, DELAYED RELEASE ORAL at 05:31

## 2022-09-19 RX ADMIN — BENZONATATE 100 MG: 100 CAPSULE ORAL at 08:52

## 2022-09-19 ASSESSMENT — PAIN - FUNCTIONAL ASSESSMENT
PAIN_FUNCTIONAL_ASSESSMENT: ACTIVITIES ARE NOT PREVENTED

## 2022-09-19 ASSESSMENT — PAIN DESCRIPTION - LOCATION
LOCATION: CHEST
LOCATION: CHEST;SHOULDER
LOCATION: CHEST

## 2022-09-19 ASSESSMENT — PAIN DESCRIPTION - ORIENTATION
ORIENTATION: RIGHT

## 2022-09-19 ASSESSMENT — PAIN SCALES - GENERAL
PAINLEVEL_OUTOF10: 3
PAINLEVEL_OUTOF10: 3
PAINLEVEL_OUTOF10: 8
PAINLEVEL_OUTOF10: 6
PAINLEVEL_OUTOF10: 8
PAINLEVEL_OUTOF10: 5

## 2022-09-19 ASSESSMENT — PAIN DESCRIPTION - DESCRIPTORS
DESCRIPTORS: STABBING
DESCRIPTORS: DULL;DISCOMFORT
DESCRIPTORS: PRESSURE
DESCRIPTORS: PRESSURE

## 2022-09-19 ASSESSMENT — PAIN DESCRIPTION - PAIN TYPE: TYPE: ACUTE PAIN

## 2022-09-19 ASSESSMENT — PAIN DESCRIPTION - FREQUENCY: FREQUENCY: CONTINUOUS

## 2022-09-19 NOTE — PROGRESS NOTES
Hospitalist Progress Note      PCP: Danny Lopez MD    Date of Admission: 9/9/2022    Subjective:     Denies SOB this am. Feels well. Medications:  Reviewed    Infusion Medications    dextrose      sodium chloride       Scheduled Medications    albuterol  2.5 mg Nebulization TID    enoxaparin  30 mg SubCUTAneous Daily    benzonatate  100 mg Oral TID    acetaminophen  1,000 mg Oral Once    atorvastatin  40 mg Oral Daily    guaiFENesin  1,200 mg Oral QAM    levothyroxine  125 mcg Oral Daily    magnesium oxide  400 mg Oral Daily    nadolol  20 mg Oral Daily    pantoprazole  40 mg Oral QAM AC    insulin lispro  0-4 Units SubCUTAneous TID WC    insulin lispro  0-4 Units SubCUTAneous Nightly    sodium chloride flush  5-40 mL IntraVENous 2 times per day    sertraline  200 mg Oral Daily    Pirfenidone  3 tablet Oral TID     PRN Meds: benzonatate, loperamide, guaiFENesin-dextromethorphan, albuterol sulfate HFA, hydrOXYzine HCl, glucose, dextrose bolus **OR** dextrose bolus, glucagon (rDNA), dextrose, sodium chloride flush, sodium chloride, ondansetron **OR** ondansetron, polyethylene glycol, acetaminophen **OR** acetaminophen, morphine, morphine, mirtazapine, methocarbamol      Intake/Output Summary (Last 24 hours) at 9/19/2022 1448  Last data filed at 9/19/2022 1345  Gross per 24 hour   Intake 540 ml   Output 450 ml   Net 90 ml         Physical Exam Performed:    /60   Pulse 77   Temp 97.8 °F (36.6 °C) (Oral)   Resp 18   Ht 5' 3\" (1.6 m)   Wt 109 lb 3.2 oz (49.5 kg)   SpO2 95%   BMI 19.34 kg/m²     Gen: No distress. Alert. Awake, oriented . Thin , elderly female . Looks ill , fatigued   Eyes: PERRL. No sclera icterus. No conjunctival injection. ENT: No discharge. Pharynx clear. Neck: No JVD. Trachea midline. Resp: No accessory muscle use. Diminished breath sounds . No crackles. No wheezes. No rhonchi. R chest tube to suction   CV: Regular rate. Regular rhythm. No murmur. No rub.  No edema. Capillary Refill: Brisk,< 3 seconds   Peripheral Pulses: +2 palpable, equal bilaterally   GI: Non-tender. Non-distended. Normal bowel sounds. Skin: Warm and dry. No nodule on exposed extremities. No rash on exposed extremities. M/S: No cyanosis. No joint deformity. No clubbing. Neuro: Awake. Grossly nonfocal    Psych: Oriented x 3. No anxiety or agitation. Labs:   Recent Labs     09/18/22  1014 09/19/22  0448   WBC 10.6 9.2   HGB 9.0* 8.9*   HCT 28.4* 27.6*    248       Recent Labs     09/18/22  1014 09/19/22  0449    139   K 3.6 3.6   CL 99 102   CO2 25 30   BUN 11 15   CREATININE <0.5* <0.5*   CALCIUM 8.4 8.4       No results for input(s): AST, ALT, BILIDIR, BILITOT, ALKPHOS in the last 72 hours. No results for input(s): INR in the last 72 hours. No results for input(s): Linda Arp in the last 72 hours. Urinalysis:      Lab Results   Component Value Date/Time    NITRU Negative 09/09/2022 05:49 PM    WBCUA 3-5 05/28/2022 06:39 AM    BACTERIA Rare 05/28/2022 06:39 AM    RBCUA 0-2 05/28/2022 06:39 AM    BLOODU Negative 09/09/2022 05:49 PM    SPECGRAV 1.015 09/09/2022 05:49 PM    GLUCOSEU Negative 09/09/2022 05:49 PM       Radiology:  XR CHEST PORTABLE   Final Result   In this patient with a diffuse pattern of chronic interstitial change, there   is near complete resolution of right apical pneumothorax. XR CHEST PORTABLE   Final Result   Near complete resolution of right apical pneumothorax. Diffuse interstitial   changes are otherwise noted throughout the lungs. XR CHEST PORTABLE   Final Result   New moderate right pneumothorax. The right chest tube is now more inferiorly   located than before. The chest appears otherwise stable. XR CHEST PORTABLE   Final Result   No right-sided pneumothorax, within indwelling chest tube noted.       Extensive interstitial infiltrates are seen, similar to the prior exam.         XR CHEST PORTABLE   Final Result Similar appearing chest.         XR CHEST PORTABLE   Final Result   New right chest tube in place, resolution of right pneumothorax         CT GUIDED CHEST TUBE   Final Result   Successful CT guided placement of a right chest tube. CT GUIDED NEEDLE PLACEMENT   Final Result   Successful CT guided placement of a right chest tube. XR CHEST PORTABLE   Final Result   Slight interval retraction of right pleural catheter with increasing volume   of right pneumothorax. Replacement or repositioning of the pleural catheter   is advised. Findings were called at 8:09 a.m. .  At 8:24 a.m., findings were discussed   with Kevin Brewer RN, on 9/15/2022. XR CHEST PORTABLE   Final Result   No change position of the right chest tube with a new small right apical   pneumothorax and new subcutaneous air along the right chest wall. Some of   the holes in the pigtail catheter are within the chest wall. Otherwise stable appearance of chest with diffuse interstitial prominence. XR CHEST PORTABLE   Final Result   No pneumothorax on the right. Pleural drainage catheter seen on the right      Fibrotic changes in the lungs, similar to prior         XR CHEST PORTABLE   Final Result   1. Complete resolution of the previously noted right apical pneumothorax. XR CHEST PORTABLE   Final Result   1. Worsening small to moderate right apical pneumothorax. XR CHEST PORTABLE   Final Result   Diffuse bilateral honeycomb lung disease. No pneumothorax evident. Pigtail   partially in the right pleural space. XR CHEST PORTABLE   Final Result   Partial retraction of right chest tube. Distal side port may be extra   thoracic. There is perhaps minimal subcutaneous air. Small right apical pneumothorax, decreased in the interval.      Stable extensive interstitial pattern.          XR CHEST PORTABLE   Final Result   Increased size of right-sided pneumothorax measuring 1.6 cm with stable positioning of the right apically oriented pigtail thoracostomy tube. The findings were sent to the [facility] Results Communication Team at 15:57 on   9/10/2022 to be communicated to a licensed caregiver. XR CHEST PORTABLE   Final Result   Right chest tube is in normal position. No pneumothorax is evident. Interstitial opacities, correlating with extensive emphysematous disease. XR CHEST PORTABLE   Final Result   Interval placement of a locking loop chest tube at the right lung apex with   resolved pneumothorax. CT CHEST PULMONARY EMBOLISM W CONTRAST   Final Result   No evidence of pulmonary embolism      Right apical, anterior and lateral pneumothorax approximately 30%. Chest tube   extends along the right lateral chest wall however tip is extrapleural and   lateral to the upper ribcage      Coronary artery/atherosclerotic disease. Cardiomegaly      Extensive bullous changes and pulmonary fibrosis      Small right pleural effusion      RECOMMENDATIONS:   Re-evaluate for reinsertion of right chest tube         XR CHEST PORTABLE   Final Result   Nearly completely resolved right pneumothorax. Chest tube along the right   lateral hemithorax however tip appears extrapleural.         XR CHEST PORTABLE   Final Result   1. Moderate right-sided pneumothorax with approximately 4 cm of pleural   separation at the lung apex. No definite evidence of tension morphology at   this time.       2.  Chronic interstitial lung disease         XR CHEST PORTABLE    (Results Pending)           Assessment/Plan:    Active Hospital Problems    Diagnosis     COVID-19 [U07.1]      Priority: Medium    Secondary spontaneous pneumothorax [J93.12]      Priority: Medium    Pneumonia due to COVID-19 virus [U07.1, J12.82]      Priority: Medium    Primary spontaneous pneumothorax [J93.11]      Priority: Medium    Acute respiratory failure with hypoxia (HCC) [J96.01]     Acute on chronic respiratory failure with hypoxemia (HCC) [J96.21]             Acute on chronic respiratory failure  Hx of ILD on 3 L at home  Covid 19 infection   Right pneumothorax  -seen by pulmonology  -pt with hx of Pulmonary fibrosis  and Hx of cryptogenic organizing pneumonia with chronic resp failure with recurrent admissions  - imaging with right sided pneumothorax likely induced by cough   -> s.p chest tube placement   - doubt hypoxia due to covid , as it improved since chest tube is placed and now back on home level  - can wean steroids  - resumed Pirfenidone   -Continue Decadron 6 mg daily  -Was requiring O2 up to 5 L , oxygen saturation now stable at baseline O2 4 L  - 9/15 worsening pneumothorax ; chest tube not in position.  -> it was replaced with new chest tube ;   Pulmonology contacted cardiothoracic surgery for possible transfer to [facility]; recommendation was to place pneumostat   - pneumostat placed 9/17, repeat CXR with pneumotx and placed back on chest tube with suction  - CXR today stable       Chronic diastolic CHF  last echo as above, from 5/6/22 EF 55%  -BNP mildly elevated  - pt was taken of diuretics recently for weight loss   - monitor for fluid overload and resume lasix as needed only        S.p recent L1 burst fracture   -  s/p T11-L3 PDFF with L1 corpectomy at  ( 8/22)       DM type 2  -Has been taken off diuretics and diabetic meds for progressive weight loss   - hyperglycemia with steroids  -SSI  -continue to monitor BG       HTN  -BP stable  -on nadolol       HLD  -continue statin       Hypothyroidism  -continue synthroid       Iron Deficiency Anemia  - Recent Hemorrhoidal bleed -recent colonoscopy neg  - hb at 8 .4  -continue PO iron  -Hgb stable       Depression  Anxiety  -continue zoloft  -prn ativan         Diarrhea  -Imodium as needed  Cut down intake of tomato soup  Diarrhea resolved           DVT Prophylaxis: Lovenox  Diet: ADULT DIET;  Regular; 4 carb choices (60 gm/meal)  ADULT ORAL NUTRITION SUPPLEMENT; Breakfast, Dinner, [employer];  Low Calorie/High Protein Oral Supplement  Code Status: Full Code  PT/OT Eval Status: ordered    [facility]        Ria De León MD

## 2022-09-19 NOTE — FLOWSHEET NOTE
09/19/22 0347   Vital Signs   Temp 98.1 °F (36.7 °C)   Temp Source Oral   Heart Rate 81   Heart Rate Source Monitor   Resp 20   /74   BP Location Right upper arm   MAP (Calculated) 83.67   Level of Consciousness 0   MEWS Score 1   Oxygen Therapy   SpO2 95 %   Pulse Oximeter Device Mode Intermittent   Pulse Oximeter Device Location Finger   O2 Device Nasal cannula   O2 Flow Rate (L/min) 3 L/min     Pt awake in bed. VS as shown above. Pt denies any further needs at this time. Call light in reach and bed in lowest position.

## 2022-09-19 NOTE — PROGRESS NOTES
Shift assessment complete, see flowsheet. Pt A&O x4. Chest tube assessed, remains to continuous suction. re-secured with tape to avoid tugging on tubing. Scheduled morning medications to be administered by student nurse with instructor supervision. Pt denies any further needs at this time. Call light left within reach. Bed locked, in lowest position, with bed alarm active.

## 2022-09-19 NOTE — PROGRESS NOTES
RT Inhaler-Nebulizer Bronchodilator Protocol Note    There is a bronchodilator order in the chart from a provider indicating to follow the RT Bronchodilator Protocol and there is an Initiate RT Inhaler-Nebulizer Bronchodilator Protocol order as well (see protocol at bottom of note). CXR Findings:  XR CHEST PORTABLE    Result Date: 9/19/2022  In this patient with a diffuse pattern of chronic interstitial change, there is near complete resolution of right apical pneumothorax. XR CHEST PORTABLE    Result Date: 9/18/2022  Near complete resolution of right apical pneumothorax. Diffuse interstitial changes are otherwise noted throughout the lungs. XR CHEST PORTABLE    Result Date: 9/17/2022  New moderate right pneumothorax. The right chest tube is now more inferiorly located than before. The chest appears otherwise stable. The findings from the last RT Protocol Assessment were as follows:   History Pulmonary Disease: Chronic pulmonary disease  Respiratory Pattern: Dyspnea on exertion or RR 21-25 bpm  Breath Sounds: Slightly diminished and/or crackles  Cough: Strong, spontaneous, non-productive  Indication for Bronchodilator Therapy: Decreased or absent breath sounds  Bronchodilator Assessment Score: 6    Aerosolized bronchodilator medication orders have been revised according to the RT Inhaler-Nebulizer Bronchodilator Protocol below. Respiratory Therapist to perform RT Therapy Protocol Assessment initially then follow the protocol. Repeat RT Therapy Protocol Assessment PRN for score 0-3 or on second treatment, BID, and PRN for scores above 3. No Indications - adjust the frequency to every 6 hours PRN wheezing or bronchospasm, if no treatments needed after 48 hours then discontinue using Per Protocol order mode. If indication present, adjust the RT bronchodilator orders based on the Bronchodilator Assessment Score as indicated below.   Use Inhaler orders unless patient has one or more of the following: on home nebulizer, not able to hold breath for 10 seconds, is not alert and oriented, cannot activate and use MDI correctly, or respiratory rate 25 breaths per minute or more, then use the equivalent nebulizer order(s) with same Frequency and PRN reasons based on the score. If a patient is on this medication at home then do not decrease Frequency below that used at home. 0-3 - enter or revise RT bronchodilator order(s) to equivalent RT Bronchodilator order with Frequency of every 4 hours PRN for wheezing or increased work of breathing using Per Protocol order mode. 4-6 - enter or revise RT Bronchodilator order(s) to two equivalent RT bronchodilator orders with one order with BID Frequency and one order with Frequency of every 4 hours PRN wheezing or increased work of breathing using Per Protocol order mode. 7-10 - enter or revise RT Bronchodilator order(s) to two equivalent RT bronchodilator orders with one order with TID Frequency and one order with Frequency of every 4 hours PRN wheezing or increased work of breathing using Per Protocol order mode. 11-13 - enter or revise RT Bronchodilator order(s) to one equivalent RT bronchodilator order with QID Frequency and an Albuterol order with Frequency of every 4 hours PRN wheezing or increased work of breathing using Per Protocol order mode. Greater than 13 - enter or revise RT Bronchodilator order(s) to one equivalent RT bronchodilator order with every 4 hours Frequency and an Albuterol order with Frequency of every 2 hours PRN wheezing or increased work of breathing using Per Protocol order mode.    \    Electronically signed by Dain Penn RCP on 9/19/2022 at 7:50 AM

## 2022-09-19 NOTE — PROGRESS NOTES
Occupational Therapy  Attempted treatment this morning. Patient requesting to rest at this time. Patient recently returned from [facility] and completed bath prior to therapist arrival. OT will follow up later this date as schedule allows.        ALVARO JohnsonR/LORRI #[id number]

## 2022-09-19 NOTE — PROGRESS NOTES
SARS-CoV-2 POSITIVE  and influenza negative  9/9/2022 blood NGTD    Imaging:  CTPA 9/9/22:  bullous emphysema, pulmonary fibrosis, right-sided pneumothorax    pCXR 9/11/22 AM:  small right apical pneumothorax, possible retraction of chest tube    pCXR 9/13/22 AM:  Worsening small to moderate right apical pneumothorax.   (found tube had been clamped)     pCXR 9/13/22 After unclamping:  Complete resolution of the previously noted right apical pneumothorax. pCXR 9/14/22 AM:  No pneumothorax on the right. Pleural drainage catheter seen on the right. Fibrotic changes in the lungs, similar to prior     pCXR 9/14/22 with tube clamped 4 hrs:  small apical PTX     pCXR 9/15/22 AM:  Slight interval retraction of right pleural catheter with increasing volume of right pneumothorax. Replacement or repositioning of the pleural catheter is advised. pCXR 9/16/22 AM:  New right chest tube in place. Resolution of right  pneumothorax. Soft tissue emphysema again seen in the right chest wall. No new airspace disease. pCXR 9/17/22 AM:  Similar appearing chest.     pCXR 9/17/22 after 3 hour pneumostat trial: new right moderate pneumothorax, chest tube is more inferiorly located than prior    pCXR 9/18/22 AM:  Near complete resolution of right pneumothorax with persistent pleural catheter. Tiny residual apical pneumothorax noted.     ASSESSMENT:  Acute on chronic hypoxemic respiratory failure, baseline 3-4 L O2  COVID-19 pneumonia  Secondary right-sided spontaneous pneumothorax   Right chest tube #1 9/9/22 - 9/15/22  Right chest tube #2 9/15/22   ILD, h/o  & NSIP v chronic HP now with acute exacerbation   Severe bullous changes/paraseptal emphysema - no tobacco history   Chronic diastolic CHF  Y01-S7 PDFF withL1 corpectomy @  8/22/22 for L1 burst fracture      PLAN:  Supplemental oxygen to maintain SaO2 >92%; wean as tolerated   S/P 8 days Decadron 6 mg   Home Pirfenidone   Inhaled bronchodilators   Continue chest tube to -20 suction today given incomplete reexpansion.  Trial of water seal with improvement, then of Pneumostat Heimlich valve   Daily CXR

## 2022-09-19 NOTE — CARE COORDINATION
INTERDISCIPLINARY PLAN OF CARE CONFERENCE    Date/Time: 9/19/2022 5:02 PM  Completed by: Miriam Guillaume RN, Case Management      Patient Name:  Juani Obando  YOB: 1955  Admitting Diagnosis: Pneumonia due to COVID-19 virus [U07.1, J12.82]     Admit Date/Time:  9/9/2022  5:21 PM    Chart reviewed. Interdisciplinary team contacted or reviewed plan related to patient progress and discharge plans. Disciplines included Case Management, Nursing, and Dietitian. Current Status:IP 09/10/2022  PT/OT recommendation for discharge plan of care: SNF ([facility])    Expected D/C Disposition:  Skilled nursing facility    Discharge Plan Comments:   Chart reviewed. Current rec from PT is for SNF placement. Was unable to participate in OT today due to fatigue. She did see PT over weekend and was able to participate. +Chest tube to suction today. CM will reassess tomorrow and discuss DCP then.  +CM following    Home O2 in place on admit: YES (addy)- currently on supplemental O2 at 3 liters per her home orders

## 2022-09-19 NOTE — FLOWSHEET NOTE
09/19/22 [facility]   Pain Assessment   Pain Assessment 0-10   Pain Level 6   Patient's Stated Pain Goal 2   Pain Location Chest;Shoulder   Pain Orientation Right   Pain Descriptors Stabbing;Discomfort; Other (Comment)  (twisting)   Functional Pain Assessment Activities are not prevented   Pain Type Acute pain   Pain Frequency Continuous   Non-Pharmaceutical Pain Intervention(s) Rest;Repositioned       PRN IV morphine 2mg given at this time for pain.

## 2022-09-19 NOTE — PROGRESS NOTES
Pt awake in bed. Assessment completed and medications given per MAR. VS as charted in flowsheet. A&Ox4. Pt currently on 4L of oxygen. Pt denies any further needs at this time. Call light in reach and bed in lowest position.

## 2022-09-20 ENCOUNTER — APPOINTMENT (OUTPATIENT)
Dept: GENERAL RADIOLOGY | Age: 67
DRG: 177 | End: 2022-09-20
Payer: MEDICARE

## 2022-09-20 LAB
ANION GAP SERPL CALCULATED.3IONS-SCNC: 8 MMOL/L (ref 3–16)
BASOPHILS ABSOLUTE: 0 K/UL (ref 0–0.2)
BASOPHILS RELATIVE PERCENT: 0.3 %
BUN BLDV-MCNC: 14 MG/DL (ref 7–20)
CALCIUM SERPL-MCNC: 8.6 MG/DL (ref 8.3–10.6)
CHLORIDE BLD-SCNC: 101 MMOL/L (ref 99–110)
CO2: 30 MMOL/L (ref 21–32)
CREAT SERPL-MCNC: <0.5 MG/DL (ref 0.6–1.2)
EOSINOPHILS ABSOLUTE: 0.2 K/UL (ref 0–0.6)
EOSINOPHILS RELATIVE PERCENT: 2.2 %
GFR AFRICAN AMERICAN: >60
GFR NON-AFRICAN AMERICAN: >60
GLUCOSE BLD-MCNC: 102 MG/DL (ref 70–99)
GLUCOSE BLD-MCNC: 120 MG/DL (ref 70–99)
GLUCOSE BLD-MCNC: 126 MG/DL (ref 70–99)
GLUCOSE BLD-MCNC: 85 MG/DL (ref 70–99)
GLUCOSE BLD-MCNC: 85 MG/DL (ref 70–99)
HCT VFR BLD CALC: 27.1 % (ref 36–48)
HEMOGLOBIN: 8.5 G/DL (ref 12–16)
LYMPHOCYTES ABSOLUTE: 2.6 K/UL (ref 1–5.1)
LYMPHOCYTES RELATIVE PERCENT: 23.5 %
MCH RBC QN AUTO: 26.2 PG (ref 26–34)
MCHC RBC AUTO-ENTMCNC: 31.5 G/DL (ref 31–36)
MCV RBC AUTO: 83.4 FL (ref 80–100)
MONOCYTES ABSOLUTE: 1 K/UL (ref 0–1.3)
MONOCYTES RELATIVE PERCENT: 9.1 %
NEUTROPHILS ABSOLUTE: 7.2 K/UL (ref 1.7–7.7)
NEUTROPHILS RELATIVE PERCENT: 64.9 %
PDW BLD-RTO: 16.6 % (ref 12.4–15.4)
PERFORMED ON: ABNORMAL
PERFORMED ON: NORMAL
PLATELET # BLD: 234 K/UL (ref 135–450)
PMV BLD AUTO: 7.5 FL (ref 5–10.5)
POTASSIUM SERPL-SCNC: 3.3 MMOL/L (ref 3.5–5.1)
RBC # BLD: 3.26 M/UL (ref 4–5.2)
SODIUM BLD-SCNC: 139 MMOL/L (ref 136–145)
WBC # BLD: 11 K/UL (ref 4–11)

## 2022-09-20 PROCEDURE — 6370000000 HC RX 637 (ALT 250 FOR IP)

## 2022-09-20 PROCEDURE — 71045 X-RAY EXAM CHEST 1 VIEW: CPT

## 2022-09-20 PROCEDURE — 2700000000 HC OXYGEN THERAPY PER DAY

## 2022-09-20 PROCEDURE — 36415 COLL VENOUS BLD VENIPUNCTURE: CPT

## 2022-09-20 PROCEDURE — 80048 BASIC METABOLIC PNL TOTAL CA: CPT

## 2022-09-20 PROCEDURE — 6370000000 HC RX 637 (ALT 250 FOR IP): Performed by: INTERNAL MEDICINE

## 2022-09-20 PROCEDURE — 99233 SBSQ HOSP IP/OBS HIGH 50: CPT | Performed by: INTERNAL MEDICINE

## 2022-09-20 PROCEDURE — 85025 COMPLETE CBC W/AUTO DIFF WBC: CPT

## 2022-09-20 PROCEDURE — 2580000003 HC RX 258: Performed by: INTERNAL MEDICINE

## 2022-09-20 PROCEDURE — 99232 SBSQ HOSP IP/OBS MODERATE 35: CPT | Performed by: INTERNAL MEDICINE

## 2022-09-20 PROCEDURE — 6360000002 HC RX W HCPCS

## 2022-09-20 PROCEDURE — 2060000000 HC ICU INTERMEDIATE R&B

## 2022-09-20 PROCEDURE — 94640 AIRWAY INHALATION TREATMENT: CPT

## 2022-09-20 PROCEDURE — 6360000002 HC RX W HCPCS: Performed by: INTERNAL MEDICINE

## 2022-09-20 PROCEDURE — 82103 ALPHA-1-ANTITRYPSIN TOTAL: CPT

## 2022-09-20 PROCEDURE — 94761 N-INVAS EAR/PLS OXIMETRY MLT: CPT

## 2022-09-20 RX ORDER — ENOXAPARIN SODIUM 100 MG/ML
40 INJECTION SUBCUTANEOUS DAILY
Status: DISCONTINUED | OUTPATIENT
Start: 2022-09-21 | End: 2022-09-22

## 2022-09-20 RX ADMIN — ALBUTEROL SULFATE 2.5 MG: 2.5 SOLUTION RESPIRATORY (INHALATION) at 07:27

## 2022-09-20 RX ADMIN — BENZONATATE 100 MG: 100 CAPSULE ORAL at 09:45

## 2022-09-20 RX ADMIN — MORPHINE SULFATE 4 MG: 4 INJECTION, SOLUTION INTRAMUSCULAR; INTRAVENOUS at 03:20

## 2022-09-20 RX ADMIN — PIRFENIDONE 3 TABLET: 267 TABLET, FILM COATED ORAL at 09:53

## 2022-09-20 RX ADMIN — MORPHINE SULFATE 4 MG: 4 INJECTION, SOLUTION INTRAMUSCULAR; INTRAVENOUS at 17:59

## 2022-09-20 RX ADMIN — LEVOTHYROXINE SODIUM 125 MCG: 125 TABLET ORAL at 05:40

## 2022-09-20 RX ADMIN — ALBUTEROL SULFATE 2.5 MG: 2.5 SOLUTION RESPIRATORY (INHALATION) at 13:10

## 2022-09-20 RX ADMIN — GUAIFENESIN AND DEXTROMETHORPHAN 5 ML: 100; 10 SYRUP ORAL at 05:47

## 2022-09-20 RX ADMIN — ALBUTEROL SULFATE 2.5 MG: 2.5 SOLUTION RESPIRATORY (INHALATION) at 19:45

## 2022-09-20 RX ADMIN — SERTRALINE 200 MG: 100 TABLET, FILM COATED ORAL at 09:45

## 2022-09-20 RX ADMIN — GUAIFENESIN 1200 MG: 600 TABLET, EXTENDED RELEASE ORAL at 09:45

## 2022-09-20 RX ADMIN — ATORVASTATIN CALCIUM 40 MG: 40 TABLET, FILM COATED ORAL at 09:44

## 2022-09-20 RX ADMIN — BENZONATATE 100 MG: 100 CAPSULE ORAL at 23:32

## 2022-09-20 RX ADMIN — MORPHINE SULFATE 4 MG: 4 INJECTION, SOLUTION INTRAMUSCULAR; INTRAVENOUS at 23:33

## 2022-09-20 RX ADMIN — MORPHINE SULFATE 4 MG: 4 INJECTION, SOLUTION INTRAMUSCULAR; INTRAVENOUS at 09:45

## 2022-09-20 RX ADMIN — NADOLOL 20 MG: 40 TABLET ORAL at 09:44

## 2022-09-20 RX ADMIN — BENZONATATE 100 MG: 100 CAPSULE ORAL at 14:34

## 2022-09-20 RX ADMIN — ENOXAPARIN SODIUM 30 MG: 100 INJECTION SUBCUTANEOUS at 09:44

## 2022-09-20 RX ADMIN — BENZONATATE 100 MG: 100 CAPSULE ORAL at 03:20

## 2022-09-20 RX ADMIN — SODIUM CHLORIDE, PRESERVATIVE FREE 10 ML: 5 INJECTION INTRAVENOUS at 09:52

## 2022-09-20 RX ADMIN — PANTOPRAZOLE SODIUM 40 MG: 40 TABLET, DELAYED RELEASE ORAL at 05:40

## 2022-09-20 RX ADMIN — MAGNESIUM GLUCONATE 500 MG ORAL TABLET 400 MG: 500 TABLET ORAL at 09:44

## 2022-09-20 ASSESSMENT — PAIN DESCRIPTION - ORIENTATION
ORIENTATION: RIGHT

## 2022-09-20 ASSESSMENT — PAIN DESCRIPTION - PAIN TYPE
TYPE: ACUTE PAIN
TYPE: ACUTE PAIN

## 2022-09-20 ASSESSMENT — PAIN SCALES - GENERAL
PAINLEVEL_OUTOF10: 7
PAINLEVEL_OUTOF10: 7
PAINLEVEL_OUTOF10: 8
PAINLEVEL_OUTOF10: 8

## 2022-09-20 ASSESSMENT — PAIN DESCRIPTION - LOCATION
LOCATION: CHEST
LOCATION: CHEST;SHOULDER
LOCATION: SHOULDER;CHEST
LOCATION: CHEST;BACK;SHOULDER

## 2022-09-20 ASSESSMENT — PAIN DESCRIPTION - DESCRIPTORS
DESCRIPTORS: PRESSURE;SQUEEZING;DISCOMFORT
DESCRIPTORS: PRESSURE
DESCRIPTORS: PRESSURE;DISCOMFORT

## 2022-09-20 ASSESSMENT — PAIN DESCRIPTION - FREQUENCY: FREQUENCY: CONTINUOUS

## 2022-09-20 NOTE — PROGRESS NOTES
Pulmonary & Critical Care Medicine Progress Note  Hospital Day: 12   CC: COVID, pneumothorax    Subjective:   Still has small ptx. Cyest tube flushed and seems to be working fine. No bubbling/air leak. PHYSICAL EXAM:  Blood pressure 103/62, pulse 82, temperature 97.9 °F (36.6 °C), temperature source Oral, resp. rate 20, height 5' 3\" (1.6 m), weight 113 lb 6.4 oz (51.4 kg), SpO2 94 %, not currently breastfeeding. on 2L   Constitutional:  No acute distress. HENT:  Oropharynx is clear and moist.   Neck: No tracheal deviation present. Cardiovascular: Normal heart sounds. No lower extremity edema. Pulmonary/Chest: No wheezes. No rhonchi. No rales. No decreased breath sounds. No accessory muscle usage or stridor. Musculoskeletal: No cyanosis. No clubbing. Skin: Skin is warm and dry. Psychiatric: Normal mood and affect.   Neurologic: speech fluent, alert and oriented, strength symmetric      Scheduled Meds:   albuterol  2.5 mg Nebulization TID    enoxaparin  30 mg SubCUTAneous Daily    benzonatate  100 mg Oral TID    acetaminophen  1,000 mg Oral Once    atorvastatin  40 mg Oral Daily    guaiFENesin  1,200 mg Oral QAM    levothyroxine  125 mcg Oral Daily    magnesium oxide  400 mg Oral Daily    nadolol  20 mg Oral Daily    pantoprazole  40 mg Oral QAM AC    insulin lispro  0-4 Units SubCUTAneous TID WC    insulin lispro  0-4 Units SubCUTAneous Nightly    sodium chloride flush  5-40 mL IntraVENous 2 times per day    sertraline  200 mg Oral Daily    Pirfenidone  3 tablet Oral TID     Data:  CBC:   Recent Labs     09/18/22  1014 09/19/22  0448 09/20/22 0427   WBC 10.6 9.2 11.0   HGB 9.0* 8.9* 8.5*   HCT 28.4* 27.6* 27.1*   MCV 82.0 82.7 83.4    248 234       BMP:   Recent Labs     09/18/22  1014 09/19/22  0449 09/20/22 0427    139 139   K 3.6 3.6 3.3*   CL 99 102 101   CO2 25 30 30   BUN 11 15 14   CREATININE <0.5* <0.5* <0.5*       LIVER PROFILE:   No results for input(s): AST, ALT, LIPASE, BILIDIR, BILITOT, ALKPHOS in the last 72 hours. Invalid input(s): AMYLASE,  ALB    Microbiology:  9/9/2022 SARS-CoV-2 POSITIVE  and influenza negative  9/9/2022 blood NGTD    Imaging:  CTPA 9/9/22:  bullous emphysema, pulmonary fibrosis, right-sided pneumothorax    pCXR 9/11/22 AM:  small right apical pneumothorax, possible retraction of chest tube    pCXR 9/13/22 AM:  Worsening small to moderate right apical pneumothorax.   (found tube had been clamped)     pCXR 9/13/22 After unclamping:  Complete resolution of the previously noted right apical pneumothorax. pCXR 9/14/22 AM:  No pneumothorax on the right. Pleural drainage catheter seen on the right. Fibrotic changes in the lungs, similar to prior     pCXR 9/14/22 with tube clamped 4 hrs:  small apical PTX     pCXR 9/15/22 AM:  Slight interval retraction of right pleural catheter with increasing volume of right pneumothorax. Replacement or repositioning of the pleural catheter is advised. pCXR 9/16/22 AM:  New right chest tube in place. Resolution of right  pneumothorax. Soft tissue emphysema again seen in the right chest wall. No new airspace disease. pCXR 9/17/22 AM:  Similar appearing chest.     pCXR 9/17/22 after 3 hour pneumostat trial: new right moderate pneumothorax, chest tube is more inferiorly located than prior    pCXR 9/18/22 AM:  Near complete resolution of right pneumothorax with persistent pleural catheter. Tiny residual apical pneumothorax noted.     ASSESSMENT:  Acute on chronic hypoxemic respiratory failure, baseline 3-4 L O2  COVID-19 pneumonia  Secondary right-sided spontaneous pneumothorax   Right chest tube #1 9/9/22 - 9/15/22  Right chest tube #2 9/15/22   ILD, h/o  & NSIP v chronic HP now with acute exacerbation   Severe bullous changes/paraseptal emphysema - no tobacco history   Chronic diastolic CHF  P70-A2 PDFF withL1 corpectomy @  8/22/22 for L1 burst fracture      PLAN:  Supplemental oxygen to maintain SaO2 >92%; wean as tolerated   S/P 8 days Decadron 6 mg   Home Pirfenidone   Inhaled bronchodilators   Continue chest tube to -20 suction today given incomplete reexpansion.  Trial of water seal with improvement, then of Pneumostat Heimlich valve   Daily CXR

## 2022-09-20 NOTE — CARE COORDINATION
INTERDISCIPLINARY PLAN OF CARE CONFERENCE    Date/Time: 9/20/2022 10:35 AM  Completed by: Edgar Liz RN, Case Management      Patient Name:  Candace Obando  YOB: 1955  Admitting Diagnosis: Pneumonia due to COVID-19 virus [U07.1, J12.82]     Admit Date/Time:  9/9/2022  5:21 PM    Chart reviewed. Interdisciplinary team contacted or reviewed plan related to patient progress and discharge plans. Disciplines included Case Management, Nursing, and Dietitian. Current Status: IP 09/10/2022  PT/OT recommendation for discharge plan of care: Discharge Recommendations: SNF  DME needs for discharge: defer to facility  Expected D/C Disposition:  Home  Confirmed plan with patient and spouse at bedside  Discharge Plan Comments: Chart reviewed. Pt is alert and oriented to person, place time and situation. She would like to work with PT OT again once chest tube is removed with the goal to discharge to her home. Met with pt and spouse at bedside and explained the role of the CM. Adamantly refuses SNF/rehab placement. Plans to return home with spouse to provide 24/7 assist and [facility]. Home O2 in place on admit: Yes ([place], baseline 3 liters)  Pt informed of need to bring portable home O2 tank on day of discharge for nursing to connect prior to leaving:  Yes  Verbalized agreement/Understanding:   Yes

## 2022-09-20 NOTE — PROGRESS NOTES
Shift assessment complete, see flowsheet. Pt A&O x4. Denies feelings of SOB. Remains on baseline dose at 3L of oxygen. Scheduled morning medications administered per eMAR at this time. No further needs expressed by pt. Call light left within reach.

## 2022-09-20 NOTE — PROGRESS NOTES
Assist pt up to [facility]. Minimal assistance required, pt tolerated activity well. Suggested to pt that instead of going back to bed, to switch it up and try sitting in recliner just while she eats lunch. Pt not agreeable. Continues to refuse to participate with PT/OT as well.

## 2022-09-20 NOTE — PROGRESS NOTES
Occupational Therapy    Attempted OT treatment twice this morning. Initially pt declined treatment as she had just received her morphine and wanted time for it to work. Attempted again an hour later and pt reported she was too short of breath to work with therapy. Her SpO2 was 90-91%. Explained to pt the importance of getting out of bed, and that OT would probably not be able to return today for another attempt. She still politely declined. Will re-attempt another day.     Serenity Powers Senthil 85, OTR/L  #[id number]

## 2022-09-20 NOTE — PROGRESS NOTES
RT Inhaler-Nebulizer Bronchodilator Protocol Note    There is a bronchodilator order in the chart from a provider indicating to follow the RT Bronchodilator Protocol and there is an Initiate RT Inhaler-Nebulizer Bronchodilator Protocol order as well (see protocol at bottom of note). CXR Findings:  XR CHEST PORTABLE    Result Date: 9/19/2022  In this patient with a diffuse pattern of chronic interstitial change, there is near complete resolution of right apical pneumothorax. XR CHEST PORTABLE    Result Date: 9/18/2022  Near complete resolution of right apical pneumothorax. Diffuse interstitial changes are otherwise noted throughout the lungs. The findings from the last RT Protocol Assessment were as follows:   History Pulmonary Disease: Chronic pulmonary disease  Respiratory Pattern: Dyspnea on exertion or RR 21-25 bpm  Breath Sounds: Slightly diminished and/or crackles  Cough: Strong, spontaneous, non-productive  Indication for Bronchodilator Therapy: Decreased or absent breath sounds  Bronchodilator Assessment Score: 6    Aerosolized bronchodilator medication orders have been revised according to the RT Inhaler-Nebulizer Bronchodilator Protocol below. Respiratory Therapist to perform RT Therapy Protocol Assessment initially then follow the protocol. Repeat RT Therapy Protocol Assessment PRN for score 0-3 or on second treatment, BID, and PRN for scores above 3. No Indications - adjust the frequency to every 6 hours PRN wheezing or bronchospasm, if no treatments needed after 48 hours then discontinue using Per Protocol order mode. If indication present, adjust the RT bronchodilator orders based on the Bronchodilator Assessment Score as indicated below.   Use Inhaler orders unless patient has one or more of the following: on home nebulizer, not able to hold breath for 10 seconds, is not alert and oriented, cannot activate and use MDI correctly, or respiratory rate 25 breaths per minute or more, then use the equivalent nebulizer order(s) with same Frequency and PRN reasons based on the score. If a patient is on this medication at home then do not decrease Frequency below that used at home. 0-3 - enter or revise RT bronchodilator order(s) to equivalent RT Bronchodilator order with Frequency of every 4 hours PRN for wheezing or increased work of breathing using Per Protocol order mode. 4-6 - enter or revise RT Bronchodilator order(s) to two equivalent RT bronchodilator orders with one order with BID Frequency and one order with Frequency of every 4 hours PRN wheezing or increased work of breathing using Per Protocol order mode. 7-10 - enter or revise RT Bronchodilator order(s) to two equivalent RT bronchodilator orders with one order with TID Frequency and one order with Frequency of every 4 hours PRN wheezing or increased work of breathing using Per Protocol order mode. 11-13 - enter or revise RT Bronchodilator order(s) to one equivalent RT bronchodilator order with QID Frequency and an Albuterol order with Frequency of every 4 hours PRN wheezing or increased work of breathing using Per Protocol order mode. Greater than 13 - enter or revise RT Bronchodilator order(s) to one equivalent RT bronchodilator order with every 4 hours Frequency and an Albuterol order with Frequency of every 2 hours PRN wheezing or increased work of breathing using Per Protocol order mode.          Electronically signed by Lynn Pace RCP on 9/19/2022 at 9:59 PM

## 2022-09-20 NOTE — PROGRESS NOTES
Comprehensive Nutrition Assessment    Type and Reason for Visit:  Reassess    Nutrition Recommendations/Plan:   Continue ADULT DIET; Regular; 4 carb choices per meal diet order. Modified ONS to Glucerna with breakfast, dinner, and as an HS snack. Monitor appetite, meal intake, acceptance/intake of ONS, and ability to consume po nutrition with respiratory dysfunction. Monitor nutrition-related labs, bowel function, and weight trends. Malnutrition Assessment:  Malnutrition Status:  Severe malnutrition (09/20/22 1436)    Context:  Acute Illness     Findings of the 6 clinical characteristics of malnutrition:  Energy Intake:  50% or less of estimated energy requirements for 5 or more days  Weight Loss:  Greater than 7.5% over 3 months (- 21# or 15.9% weight loss since 7/17/22)     Body Fat Loss: Moderate body fat loss Orbital, Triceps, Buccal region   Muscle Mass Loss: Moderate muscle mass loss Temples (temporalis), Clavicles (pectoralis & deltoids), Hand (interosseous)  Fluid Accumulation:  No significant fluid accumulation     Strength:  Not Performed    Nutrition Assessment:    patient has slightly improved from a nutritional standpoint AEB po intake has slightly improved and she is consuming a bit more of her meals than when this RD had seen her last + patient is consuming ONS well, however, she remains at risk for further compromise d/t difficulty consuming po nutrition at times because of her respiratory status and severe muscle wasting + fat loss noted on NFPE today; will continue ADULT DIET;  Regular; 4 carb choices per meal diet order + modify ONS to Glucerna with breakfast, dinner, and as an HS snack    Nutrition Related Findings:    patient is A & O x 4; + own teeth intact and no difficulties chewing and/or swallowing reported this afternoon; patient reported that she lives at home with her  and that she orders their groceries from [employer] and that patient and her  prepare meals together; patient stated that she had 2 falls in 2022 - one was around [facility] and she fractured multiple ribs and the other fall happened around [facility] when patient was watching her laptop at ~ 3 am in the kitchen and she fell asleep and fell out of her chair + hurt her back; patient had back surgery on 8/11/22 and she was d/c'd to rehab after her surgery; she contracted COVID-19 virus in rehab and she is still having difficulty with her breathing at this time; + R-sided chest tube; + BM on 9/20/22; patient reported that she has lost ~ 80# within the past year, partially r/t patient's thyroid (hypothyroidism) disease being better controlled/managed; patient has a rollator at home to use when ambulating; patient stated that she has done fine after her back surgery except for respiratory dysfunction and complications of [id number]-22 virus Wound Type: Surgical Incision (surgical incision on back from recent back surgery; R chest tube in place)       Current Nutrition Intake & Therapies:    Average Meal Intake: 26-50%, 51-75%  Average Supplements Intake: %  ADULT DIET; Regular; 4 carb choices (60 gm/meal)  ADULT ORAL NUTRITION SUPPLEMENT; Breakfast, Dinner, HS Snack; Diabetic Oral Supplement    Anthropometric Measures:  Height: 5' 3\" (160 cm)  Ideal Body Weight (IBW): 115 lbs (52 kg)    Admission Body Weight: 126 lb 12.2 oz (57.5 kg) (obtained on 9/10/22; actual weight)  Current Body Weight: 113 lb 6.4 oz (51.4 kg) (obtained on 9/20/22; actual weight), 98.6 % IBW.  Weight Source: Bed Scale  Current BMI (kg/m2): 20.1  Usual Body Weight: 134 lb 14 oz (61.2 kg) (obtained on 7/17/22; actual weight)  % Weight Change (Calculated): -15.9  Weight Adjustment For: No Adjustment                 BMI Categories: Underweight (BMI less than 22) age over 72    Estimated Daily Nutrient Needs:  Energy Requirements Based On: Kcal/kg  Weight Used for Energy Requirements: Current  Energy (kcal/day): 1428 - 1530 kcals based on 28-30 kcals/kg/CBW  Weight Used for Protein Requirements: Current  Protein (g/day): 66 - 77 g protein based on 1.3-1.5 g/kg/CBW  Method Used for Fluid Requirements: 1 ml/kcal  Fluid (ml/day): 1428 - 1530 ml    Nutrition Diagnosis:   Severe malnutrition related to inadequate protein-energy intake, impaired respiratory function as evidenced by poor intake prior to admission, intake 26-50%, intake 51-75%, wounds, weight loss, lab values, moderate loss of subcutaneous fat, moderate muscle loss    Nutrition Interventions:   Food and/or Nutrient Delivery: Continue Current Diet, Modify Oral Nutrition Supplement  Nutrition Education/Counseling: No recommendation at this time  Coordination of Nutrition Care: Continue to monitor while inpatient, Coordination of Care       Goals:  Previous Goal Met: Progressing toward Goal(s)  Goals: Meet at least 75% of estimated needs, by next RD assessment       Nutrition Monitoring and Evaluation:   Behavioral-Environmental Outcomes: None Identified  Food/Nutrient Intake Outcomes: Food and Nutrient Intake, Supplement Intake  Physical Signs/Symptoms Outcomes: Biochemical Data, Meal Time Behavior, Nutrition Focused Physical Findings, Skin, Weight    Discharge Planning:    Continue current diet, Continue Oral Nutrition Supplement     Nicolette Stark, [address] [place] [address],   Contact: [phone]

## 2022-09-20 NOTE — PROGRESS NOTES
Called out with complaints of coughing- 02 sat 100%, asked to call RT, RT came to evaluate, unable to receive breathing treatment at this time.  Requesting Canelo Le

## 2022-09-20 NOTE — PLAN OF CARE
Problem: Discharge Planning  Goal: Discharge to home or other facility with appropriate resources  Outcome: Progressing     Problem: Safety - Adult  Goal: Free from fall injury  Outcome: Progressing     Problem: Skin/Tissue Integrity  Goal: Absence of new skin breakdown  Description: 1. Monitor for areas of redness and/or skin breakdown  2. Assess vascular access sites hourly  3. Every 4-6 hours minimum:  Change oxygen saturation probe site  4. Every 4-6 hours:  If on nasal continuous positive airway pressure, respiratory therapy assess nares and determine need for appliance change or resting period.   Outcome: Progressing     Problem: Nutrition Deficit:  Goal: Optimize nutritional status  Outcome: Progressing  Flowsheets (Taken 9/20/2022 1410 by Diego Wilson, PABLITO, LD)  Nutrient intake appropriate for improving, restoring, or maintaining nutritional needs:   Assess nutritional status and recommend course of action   Monitor oral intake, labs, and treatment plans   Recommend appropriate diets, oral nutritional supplements, and vitamin/mineral supplements     Problem: Chronic Conditions and Co-morbidities  Goal: Patient's chronic conditions and co-morbidity symptoms are monitored and maintained or improved  Outcome: Progressing     Problem: Pain  Goal: Verbalizes/displays adequate comfort level or baseline comfort level  Outcome: Progressing     Problem: ABCDS Injury Assessment  Goal: Absence of physical injury  Outcome: Progressing

## 2022-09-20 NOTE — FLOWSHEET NOTE
09/20/22 7574   Pain Assessment   Pain Assessment 0-10   Pain Level 7   Patient's Stated Pain Goal 2   Pain Location Chest;Back; Shoulder   Pain Orientation Right   Pain Descriptors Pressure; Discomfort   Functional Pain Assessment Activities are not prevented   Pain Type Acute pain   Pain Frequency Continuous   Non-Pharmaceutical Pain Intervention(s) Rest;Repositioned     PRN IV morphine 4mg given at this time for pain.

## 2022-09-20 NOTE — PROGRESS NOTES
Hospitalist Progress Note      PCP: Shena Rosas MD    Date of Admission: 9/9/2022    Subjective:     Denies SOB this am. Feels well. Oxygenation stable. Pt reports her back brace is in the closet here and she is worried about wearing it because of the chest tube. Medications:  Reviewed    Infusion Medications    dextrose      sodium chloride       Scheduled Medications    [START ON 9/21/2022] enoxaparin  40 mg SubCUTAneous Daily    albuterol  2.5 mg Nebulization TID    benzonatate  100 mg Oral TID    acetaminophen  1,000 mg Oral Once    atorvastatin  40 mg Oral Daily    guaiFENesin  1,200 mg Oral QAM    levothyroxine  125 mcg Oral Daily    magnesium oxide  400 mg Oral Daily    nadolol  20 mg Oral Daily    pantoprazole  40 mg Oral QAM AC    insulin lispro  0-4 Units SubCUTAneous TID WC    insulin lispro  0-4 Units SubCUTAneous Nightly    sodium chloride flush  5-40 mL IntraVENous 2 times per day    sertraline  200 mg Oral Daily    Pirfenidone  3 tablet Oral TID     PRN Meds: benzonatate, loperamide, guaiFENesin-dextromethorphan, albuterol sulfate HFA, hydrOXYzine HCl, glucose, dextrose bolus **OR** dextrose bolus, glucagon (rDNA), dextrose, sodium chloride flush, sodium chloride, ondansetron **OR** ondansetron, polyethylene glycol, acetaminophen **OR** acetaminophen, morphine, morphine, mirtazapine, methocarbamol      Intake/Output Summary (Last 24 hours) at 9/20/2022 1429  Last data filed at 9/19/2022 1748  Gross per 24 hour   Intake 240 ml   Output --   Net 240 ml         Physical Exam Performed:    /62   Pulse 82   Temp 97.9 °F (36.6 °C) (Oral)   Resp 18   Ht 5' 3\" (1.6 m)   Wt 113 lb 6.4 oz (51.4 kg)   SpO2 96%   BMI 20.09 kg/m²     Gen: No distress. Alert. Awake, oriented . Thin , elderly female . Looks ill , fatigued   Eyes: PERRL. No sclera icterus. No conjunctival injection. ENT: No discharge. Pharynx clear. Neck: No JVD. Trachea midline.   Resp: No accessory muscle use. Diminished breath sounds . No crackles. No wheezes. No rhonchi. R chest tube to suction   CV: Regular rate. Regular rhythm. No murmur. No rub. No edema. Capillary Refill: Brisk,< 3 seconds   Peripheral Pulses: +2 palpable, equal bilaterally   GI: Non-tender. Non-distended. Normal bowel sounds. Skin: Warm and dry. No nodule on exposed extremities. No rash on exposed extremities. M/S: No cyanosis. No joint deformity. No clubbing. Neuro: Awake. Grossly nonfocal    Psych: Oriented x 3. No anxiety or agitation. Labs:   Recent Labs     09/18/22  1014 09/19/22  0448 09/20/22  0427   WBC 10.6 9.2 11.0   HGB 9.0* 8.9* 8.5*   HCT 28.4* 27.6* 27.1*    248 234       Recent Labs     09/18/22  1014 09/19/22  0449 09/20/22  0427    139 139   K 3.6 3.6 3.3*   CL 99 102 101   CO2 25 30 30   BUN 11 15 14   CREATININE <0.5* <0.5* <0.5*   CALCIUM 8.4 8.4 8.6       No results for input(s): AST, ALT, BILIDIR, BILITOT, ALKPHOS in the last 72 hours. No results for input(s): INR in the last 72 hours. No results for input(s): Mariela Fuchs in the last 72 hours. Urinalysis:      Lab Results   Component Value Date/Time    NITRU Negative 09/09/2022 05:49 PM    WBCUA 3-5 05/28/2022 06:39 AM    BACTERIA Rare 05/28/2022 06:39 AM    RBCUA 0-2 05/28/2022 06:39 AM    BLOODU Negative 09/09/2022 05:49 PM    SPECGRAV 1.015 09/09/2022 05:49 PM    GLUCOSEU Negative 09/09/2022 05:49 PM       Radiology:  XR CHEST PORTABLE   Final Result   1. Slight worsening small right apical pneumothorax. XR CHEST PORTABLE   Final Result   In this patient with a diffuse pattern of chronic interstitial change, there   is near complete resolution of right apical pneumothorax. XR CHEST PORTABLE   Final Result   Near complete resolution of right apical pneumothorax. Diffuse interstitial   changes are otherwise noted throughout the lungs. XR CHEST PORTABLE   Final Result   New moderate right pneumothorax. The right chest tube is now more inferiorly   located than before. The chest appears otherwise stable. XR CHEST PORTABLE   Final Result   No right-sided pneumothorax, within indwelling chest tube noted. Extensive interstitial infiltrates are seen, similar to the prior exam.         XR CHEST PORTABLE   Final Result   Similar appearing chest.         XR CHEST PORTABLE   Final Result   New right chest tube in place, resolution of right pneumothorax         CT GUIDED CHEST TUBE   Final Result   Successful CT guided placement of a right chest tube. CT GUIDED NEEDLE PLACEMENT   Final Result   Successful CT guided placement of a right chest tube. XR CHEST PORTABLE   Final Result   Slight interval retraction of right pleural catheter with increasing volume   of right pneumothorax. Replacement or repositioning of the pleural catheter   is advised. Findings were called at 8:09 a.m. .  At 8:24 a.m., findings were discussed   with Tiarra Garcia RN, on 9/15/2022. XR CHEST PORTABLE   Final Result   No change position of the right chest tube with a new small right apical   pneumothorax and new subcutaneous air along the right chest wall. Some of   the holes in the pigtail catheter are within the chest wall. Otherwise stable appearance of chest with diffuse interstitial prominence. XR CHEST PORTABLE   Final Result   No pneumothorax on the right. Pleural drainage catheter seen on the right      Fibrotic changes in the lungs, similar to prior         XR CHEST PORTABLE   Final Result   1. Complete resolution of the previously noted right apical pneumothorax. XR CHEST PORTABLE   Final Result   1. Worsening small to moderate right apical pneumothorax. XR CHEST PORTABLE   Final Result   Diffuse bilateral honeycomb lung disease. No pneumothorax evident. Pigtail   partially in the right pleural space.          XR CHEST PORTABLE   Final Result   Partial retraction of right chest tube. Distal side port may be extra   thoracic. There is perhaps minimal subcutaneous air. Small right apical pneumothorax, decreased in the interval.      Stable extensive interstitial pattern. XR CHEST PORTABLE   Final Result   Increased size of right-sided pneumothorax measuring 1.6 cm with stable   positioning of the right apically oriented pigtail thoracostomy tube. The findings were sent to the [facility] Results Communication Team at 15:57 on   9/10/2022 to be communicated to a licensed caregiver. XR CHEST PORTABLE   Final Result   Right chest tube is in normal position. No pneumothorax is evident. Interstitial opacities, correlating with extensive emphysematous disease. XR CHEST PORTABLE   Final Result   Interval placement of a locking loop chest tube at the right lung apex with   resolved pneumothorax. CT CHEST PULMONARY EMBOLISM W CONTRAST   Final Result   No evidence of pulmonary embolism      Right apical, anterior and lateral pneumothorax approximately 30%. Chest tube   extends along the right lateral chest wall however tip is extrapleural and   lateral to the upper ribcage      Coronary artery/atherosclerotic disease. Cardiomegaly      Extensive bullous changes and pulmonary fibrosis      Small right pleural effusion      RECOMMENDATIONS:   Re-evaluate for reinsertion of right chest tube         XR CHEST PORTABLE   Final Result   Nearly completely resolved right pneumothorax. Chest tube along the right   lateral hemithorax however tip appears extrapleural.         XR CHEST PORTABLE   Final Result   1. Moderate right-sided pneumothorax with approximately 4 cm of pleural   separation at the lung apex. No definite evidence of tension morphology at   this time.       2.  Chronic interstitial lung disease         XR CHEST 1 VIEW    (Results Pending)           Assessment/Plan:    Active Hospital Problems    Diagnosis     COVID-19 [U07.1] Priority: Medium    Secondary spontaneous pneumothorax [J93.12]      Priority: Medium    Pneumonia due to COVID-19 virus [U07.1, J12.82]      Priority: Medium    Primary spontaneous pneumothorax [J93.11]      Priority: Medium    Acute respiratory failure with hypoxia (HCC) [J96.01]     Acute on chronic respiratory failure with hypoxemia (HCC) [J96.21]             Acute on chronic respiratory failure  Hx of ILD on 3 L at home  Covid 19 infection   Right pneumothorax  -seen by pulmonology  -pt with hx of Pulmonary fibrosis  and Hx of cryptogenic organizing pneumonia with chronic resp failure with recurrent admissions  - imaging with right sided pneumothorax likely induced by cough   -> s.p chest tube placement   - doubt hypoxia due to covid , as it improved since chest tube is placed and now back on home level  - can wean steroids  - resumed Pirfenidone   -Continue Decadron 6 mg daily  -Was requiring O2 up to 5 L , oxygen saturation now stable at baseline O2 4 L  - 9/15 worsening pneumothorax ; chest tube not in position.  -> it was replaced with new chest tube ;   Pulmonology contacted cardiothoracic surgery for possible transfer to [facility]; recommendation was to place pneumostat   - pneumostat placed 9/17, repeat CXR with pneumotx and placed back on chest tube with suction  - CXR today stable       Chronic diastolic CHF  last echo as above, from 5/6/22 EF 55%  -BNP mildly elevated  - pt was taken of diuretics recently for weight loss   - monitor for fluid overload and resume lasix as needed only        S.p recent L1 burst fracture   -  s/p T11-L3 PDFF with L1 corpectomy at  ( 8/22)       DM type 2  -Has been taken off diuretics and diabetic meds for progressive weight loss   - hyperglycemia with steroids  -SSI  -continue to monitor BG       HTN  -BP stable  -on nadolol       HLD  -continue statin       Hypothyroidism  -continue synthroid       Iron Deficiency Anemia  - Recent Hemorrhoidal bleed -recent colonoscopy neg  - hb at 8 .4  -continue PO iron  -Hgb stable       Depression  Anxiety  -continue zoloft  -prn ativan         Diarrhea  -Imodium as needed  Cut down intake of tomato soup  Diarrhea resolved           DVT Prophylaxis: Lovenox  Diet: ADULT DIET;  Regular; 4 carb choices (60 gm/meal)  ADULT ORAL NUTRITION SUPPLEMENT; Breakfast, Dinner, HS Snack; Diabetic Oral Supplement  Code Status: Full Code  PT/OT Eval Status: ordered    [facility]        Kofi Carballo MD

## 2022-09-20 NOTE — PROGRESS NOTES
RT Inhaler-Nebulizer Bronchodilator Protocol Note    There is a bronchodilator order in the chart from a provider indicating to follow the RT Bronchodilator Protocol and there is an Initiate RT Inhaler-Nebulizer Bronchodilator Protocol order as well (see protocol at bottom of note). CXR Findings:  XR CHEST PORTABLE    Result Date: 9/19/2022  In this patient with a diffuse pattern of chronic interstitial change, there is near complete resolution of right apical pneumothorax. XR CHEST PORTABLE    Result Date: 9/18/2022  Near complete resolution of right apical pneumothorax. Diffuse interstitial changes are otherwise noted throughout the lungs. The findings from the last RT Protocol Assessment were as follows:   History Pulmonary Disease: (P) Chronic pulmonary disease  Respiratory Pattern: (P) Dyspnea on exertion or RR 21-25 bpm  Breath Sounds: (P) Slightly diminished and/or crackles  Cough: (P) Strong, spontaneous, non-productive  Indication for Bronchodilator Therapy: (P) Decreased or absent breath sounds  Bronchodilator Assessment Score: (P) 6    Aerosolized bronchodilator medication orders have been revised according to the RT Inhaler-Nebulizer Bronchodilator Protocol below. Respiratory Therapist to perform RT Therapy Protocol Assessment initially then follow the protocol. Repeat RT Therapy Protocol Assessment PRN for score 0-3 or on second treatment, BID, and PRN for scores above 3. No Indications - adjust the frequency to every 6 hours PRN wheezing or bronchospasm, if no treatments needed after 48 hours then discontinue using Per Protocol order mode. If indication present, adjust the RT bronchodilator orders based on the Bronchodilator Assessment Score as indicated below.   Use Inhaler orders unless patient has one or more of the following: on home nebulizer, not able to hold breath for 10 seconds, is not alert and oriented, cannot activate and use MDI correctly, or respiratory rate 25 breaths per minute or more, then use the equivalent nebulizer order(s) with same Frequency and PRN reasons based on the score. If a patient is on this medication at home then do not decrease Frequency below that used at home. 0-3 - enter or revise RT bronchodilator order(s) to equivalent RT Bronchodilator order with Frequency of every 4 hours PRN for wheezing or increased work of breathing using Per Protocol order mode. 4-6 - enter or revise RT Bronchodilator order(s) to two equivalent RT bronchodilator orders with one order with BID Frequency and one order with Frequency of every 4 hours PRN wheezing or increased work of breathing using Per Protocol order mode. 7-10 - enter or revise RT Bronchodilator order(s) to two equivalent RT bronchodilator orders with one order with TID Frequency and one order with Frequency of every 4 hours PRN wheezing or increased work of breathing using Per Protocol order mode. 11-13 - enter or revise RT Bronchodilator order(s) to one equivalent RT bronchodilator order with QID Frequency and an Albuterol order with Frequency of every 4 hours PRN wheezing or increased work of breathing using Per Protocol order mode. Greater than 13 - enter or revise RT Bronchodilator order(s) to one equivalent RT bronchodilator order with every 4 hours Frequency and an Albuterol order with Frequency of every 2 hours PRN wheezing or increased work of breathing using Per Protocol order mode.          Electronically signed by Maninder Angel RCP on 9/20/2022 at 7:32 AM

## 2022-09-20 NOTE — PROGRESS NOTES
09/20/22 1900   RT Protocol   History Pulmonary Disease 2   Respiratory pattern 2   Breath sounds 2   Cough 0   Indications for Bronchodilator Therapy Decreased or absent breath sounds   Bronchodilator Assessment Score 6   RT Inhaler-Nebulizer Bronchodilator Protocol Note    There is a bronchodilator order in the chart from a provider indicating to follow the RT Bronchodilator Protocol and there is an Initiate RT Inhaler-Nebulizer Bronchodilator Protocol order as well (see protocol at bottom of note). CXR Findings:  XR CHEST PORTABLE    Result Date: 9/20/2022  1. Slight worsening small right apical pneumothorax. XR CHEST PORTABLE    Result Date: 9/19/2022  In this patient with a diffuse pattern of chronic interstitial change, there is near complete resolution of right apical pneumothorax. The findings from the last RT Protocol Assessment were as follows:   History Pulmonary Disease: Chronic pulmonary disease  Respiratory Pattern: Dyspnea on exertion or RR 21-25 bpm  Breath Sounds: Slightly diminished and/or crackles  Cough: Strong, spontaneous, non-productive  Indication for Bronchodilator Therapy: Decreased or absent breath sounds  Bronchodilator Assessment Score: 6    Aerosolized bronchodilator medication orders have been revised according to the RT Inhaler-Nebulizer Bronchodilator Protocol below. Respiratory Therapist to perform RT Therapy Protocol Assessment initially then follow the protocol. Repeat RT Therapy Protocol Assessment PRN for score 0-3 or on second treatment, BID, and PRN for scores above 3. No Indications - adjust the frequency to every 6 hours PRN wheezing or bronchospasm, if no treatments needed after 48 hours then discontinue using Per Protocol order mode. If indication present, adjust the RT bronchodilator orders based on the Bronchodilator Assessment Score as indicated below.   Use Inhaler orders unless patient has one or more of the following: on home nebulizer, not able to hold breath for 10 seconds, is not alert and oriented, cannot activate and use MDI correctly, or respiratory rate 25 breaths per minute or more, then use the equivalent nebulizer order(s) with same Frequency and PRN reasons based on the score. If a patient is on this medication at home then do not decrease Frequency below that used at home. 0-3 - enter or revise RT bronchodilator order(s) to equivalent RT Bronchodilator order with Frequency of every 4 hours PRN for wheezing or increased work of breathing using Per Protocol order mode. 4-6 - enter or revise RT Bronchodilator order(s) to two equivalent RT bronchodilator orders with one order with BID Frequency and one order with Frequency of every 4 hours PRN wheezing or increased work of breathing using Per Protocol order mode. 7-10 - enter or revise RT Bronchodilator order(s) to two equivalent RT bronchodilator orders with one order with TID Frequency and one order with Frequency of every 4 hours PRN wheezing or increased work of breathing using Per Protocol order mode. 11-13 - enter or revise RT Bronchodilator order(s) to one equivalent RT bronchodilator order with QID Frequency and an Albuterol order with Frequency of every 4 hours PRN wheezing or increased work of breathing using Per Protocol order mode. Greater than 13 - enter or revise RT Bronchodilator order(s) to one equivalent RT bronchodilator order with every 4 hours Frequency and an Albuterol order with Frequency of every 2 hours PRN wheezing or increased work of breathing using Per Protocol order mode.      Electronically signed by Meliton Meyers RCP on 9/20/2022 at 7:48 PM

## 2022-09-20 NOTE — FLOWSHEET NOTE
09/19/22 2000   Vital Signs   Temp 98.2 °F (36.8 °C)   Temp Source Oral   Heart Rate 82   Heart Rate Source Monitor   Resp 20   /70   BP Location Right upper arm   MAP (Calculated) 85.33   Patient Position High fowlers   Pain Assessment   Pain Assessment 0-10   Pain Level 8   Patient's Stated Pain Goal 0 - No pain   Pain Location Chest   Pain Orientation Right   Pain Descriptors Dull;Discomfort   In room to see patient, requesting pain medication. Morphine given IV as ordered. Assessment completed, completed vitals. No other concerns at this time, does mention chest pain, upper right chest where patient hs complaining of pain in the past.  Chest tube is dry and intact, no sweling or redness noted.     Call light within reach if needed

## 2022-09-21 ENCOUNTER — APPOINTMENT (OUTPATIENT)
Dept: GENERAL RADIOLOGY | Age: 67
DRG: 177 | End: 2022-09-21
Payer: MEDICARE

## 2022-09-21 LAB
ANION GAP SERPL CALCULATED.3IONS-SCNC: 9 MMOL/L (ref 3–16)
BASOPHILS ABSOLUTE: 0.1 K/UL (ref 0–0.2)
BASOPHILS RELATIVE PERCENT: 0.6 %
BUN BLDV-MCNC: 14 MG/DL (ref 7–20)
CALCIUM SERPL-MCNC: 8.5 MG/DL (ref 8.3–10.6)
CHLORIDE BLD-SCNC: 101 MMOL/L (ref 99–110)
CO2: 28 MMOL/L (ref 21–32)
CREAT SERPL-MCNC: <0.5 MG/DL (ref 0.6–1.2)
EOSINOPHILS ABSOLUTE: 1.2 K/UL (ref 0–0.6)
EOSINOPHILS RELATIVE PERCENT: 10.4 %
GFR AFRICAN AMERICAN: >60
GFR NON-AFRICAN AMERICAN: >60
GLUCOSE BLD-MCNC: 121 MG/DL (ref 70–99)
GLUCOSE BLD-MCNC: 122 MG/DL (ref 70–99)
GLUCOSE BLD-MCNC: 132 MG/DL (ref 70–99)
GLUCOSE BLD-MCNC: 91 MG/DL (ref 70–99)
GLUCOSE BLD-MCNC: 97 MG/DL (ref 70–99)
HCT VFR BLD CALC: 27.2 % (ref 36–48)
HEMOGLOBIN: 8.7 G/DL (ref 12–16)
LYMPHOCYTES ABSOLUTE: 2.4 K/UL (ref 1–5.1)
LYMPHOCYTES RELATIVE PERCENT: 21.1 %
MCH RBC QN AUTO: 26.3 PG (ref 26–34)
MCHC RBC AUTO-ENTMCNC: 31.9 G/DL (ref 31–36)
MCV RBC AUTO: 82.4 FL (ref 80–100)
MONOCYTES ABSOLUTE: 0.8 K/UL (ref 0–1.3)
MONOCYTES RELATIVE PERCENT: 7.5 %
NEUTROPHILS ABSOLUTE: 6.7 K/UL (ref 1.7–7.7)
NEUTROPHILS RELATIVE PERCENT: 60.4 %
PDW BLD-RTO: 16.5 % (ref 12.4–15.4)
PERFORMED ON: ABNORMAL
PERFORMED ON: NORMAL
PLATELET # BLD: 224 K/UL (ref 135–450)
PMV BLD AUTO: 6.9 FL (ref 5–10.5)
POTASSIUM SERPL-SCNC: 4 MMOL/L (ref 3.5–5.1)
RBC # BLD: 3.3 M/UL (ref 4–5.2)
SODIUM BLD-SCNC: 138 MMOL/L (ref 136–145)
WBC # BLD: 11.2 K/UL (ref 4–11)

## 2022-09-21 PROCEDURE — 71045 X-RAY EXAM CHEST 1 VIEW: CPT

## 2022-09-21 PROCEDURE — 6360000002 HC RX W HCPCS: Performed by: INTERNAL MEDICINE

## 2022-09-21 PROCEDURE — 85025 COMPLETE CBC W/AUTO DIFF WBC: CPT

## 2022-09-21 PROCEDURE — 6370000000 HC RX 637 (ALT 250 FOR IP): Performed by: INTERNAL MEDICINE

## 2022-09-21 PROCEDURE — 97530 THERAPEUTIC ACTIVITIES: CPT

## 2022-09-21 PROCEDURE — 99233 SBSQ HOSP IP/OBS HIGH 50: CPT | Performed by: INTERNAL MEDICINE

## 2022-09-21 PROCEDURE — 2700000000 HC OXYGEN THERAPY PER DAY

## 2022-09-21 PROCEDURE — 6360000002 HC RX W HCPCS

## 2022-09-21 PROCEDURE — 99232 SBSQ HOSP IP/OBS MODERATE 35: CPT | Performed by: INTERNAL MEDICINE

## 2022-09-21 PROCEDURE — 36415 COLL VENOUS BLD VENIPUNCTURE: CPT

## 2022-09-21 PROCEDURE — 80048 BASIC METABOLIC PNL TOTAL CA: CPT

## 2022-09-21 PROCEDURE — 2580000003 HC RX 258: Performed by: INTERNAL MEDICINE

## 2022-09-21 PROCEDURE — 97535 SELF CARE MNGMENT TRAINING: CPT

## 2022-09-21 PROCEDURE — 2060000000 HC ICU INTERMEDIATE R&B

## 2022-09-21 PROCEDURE — 94640 AIRWAY INHALATION TREATMENT: CPT

## 2022-09-21 PROCEDURE — 94761 N-INVAS EAR/PLS OXIMETRY MLT: CPT

## 2022-09-21 RX ORDER — CALCIUM CARBONATE 200(500)MG
500 TABLET,CHEWABLE ORAL 3 TIMES DAILY PRN
Status: DISCONTINUED | OUTPATIENT
Start: 2022-09-21 | End: 2022-09-23 | Stop reason: HOSPADM

## 2022-09-21 RX ADMIN — PANTOPRAZOLE SODIUM 40 MG: 40 TABLET, DELAYED RELEASE ORAL at 06:27

## 2022-09-21 RX ADMIN — SODIUM CHLORIDE, PRESERVATIVE FREE 10 ML: 5 INJECTION INTRAVENOUS at 08:51

## 2022-09-21 RX ADMIN — MORPHINE SULFATE 4 MG: 4 INJECTION, SOLUTION INTRAMUSCULAR; INTRAVENOUS at 04:01

## 2022-09-21 RX ADMIN — ALBUTEROL SULFATE 2.5 MG: 2.5 SOLUTION RESPIRATORY (INHALATION) at 15:01

## 2022-09-21 RX ADMIN — BENZONATATE 100 MG: 100 CAPSULE ORAL at 08:53

## 2022-09-21 RX ADMIN — NADOLOL 20 MG: 40 TABLET ORAL at 08:51

## 2022-09-21 RX ADMIN — MAGNESIUM GLUCONATE 500 MG ORAL TABLET 400 MG: 500 TABLET ORAL at 08:50

## 2022-09-21 RX ADMIN — MORPHINE SULFATE 4 MG: 4 INJECTION, SOLUTION INTRAMUSCULAR; INTRAVENOUS at 20:20

## 2022-09-21 RX ADMIN — ATORVASTATIN CALCIUM 40 MG: 40 TABLET, FILM COATED ORAL at 08:51

## 2022-09-21 RX ADMIN — SERTRALINE 200 MG: 100 TABLET, FILM COATED ORAL at 08:50

## 2022-09-21 RX ADMIN — ALBUTEROL SULFATE 2.5 MG: 2.5 SOLUTION RESPIRATORY (INHALATION) at 19:39

## 2022-09-21 RX ADMIN — LEVOTHYROXINE SODIUM 125 MCG: 125 TABLET ORAL at 06:26

## 2022-09-21 RX ADMIN — PIRFENIDONE 3 TABLET: 267 TABLET, FILM COATED ORAL at 11:30

## 2022-09-21 RX ADMIN — ENOXAPARIN SODIUM 40 MG: 100 INJECTION SUBCUTANEOUS at 08:51

## 2022-09-21 RX ADMIN — BENZONATATE 100 MG: 100 CAPSULE ORAL at 13:21

## 2022-09-21 RX ADMIN — ALBUTEROL SULFATE 2.5 MG: 2.5 SOLUTION RESPIRATORY (INHALATION) at 07:40

## 2022-09-21 RX ADMIN — BENZONATATE 100 MG: 100 CAPSULE ORAL at 20:17

## 2022-09-21 RX ADMIN — CALCIUM CARBONATE 500 MG: 500 TABLET, CHEWABLE ORAL at 11:30

## 2022-09-21 RX ADMIN — MORPHINE SULFATE 4 MG: 4 INJECTION, SOLUTION INTRAMUSCULAR; INTRAVENOUS at 14:56

## 2022-09-21 RX ADMIN — GUAIFENESIN 1200 MG: 600 TABLET, EXTENDED RELEASE ORAL at 08:51

## 2022-09-21 RX ADMIN — PIRFENIDONE 3 TABLET: 267 TABLET, FILM COATED ORAL at 08:53

## 2022-09-21 RX ADMIN — SODIUM CHLORIDE, PRESERVATIVE FREE 10 ML: 5 INJECTION INTRAVENOUS at 20:20

## 2022-09-21 RX ADMIN — BENZONATATE 100 MG: 100 CAPSULE ORAL at 04:02

## 2022-09-21 RX ADMIN — PIRFENIDONE 3 TABLET: 267 TABLET, FILM COATED ORAL at 20:17

## 2022-09-21 ASSESSMENT — PAIN SCALES - GENERAL
PAINLEVEL_OUTOF10: 8

## 2022-09-21 ASSESSMENT — PAIN DESCRIPTION - LOCATION
LOCATION: CHEST
LOCATION: CHEST
LOCATION: CHEST;RIB CAGE

## 2022-09-21 ASSESSMENT — PAIN DESCRIPTION - ORIENTATION
ORIENTATION: RIGHT
ORIENTATION: RIGHT

## 2022-09-21 ASSESSMENT — PAIN DESCRIPTION - DESCRIPTORS
DESCRIPTORS: TIGHTNESS
DESCRIPTORS: SHARP

## 2022-09-21 ASSESSMENT — PAIN - FUNCTIONAL ASSESSMENT: PAIN_FUNCTIONAL_ASSESSMENT: ACTIVITIES ARE NOT PREVENTED

## 2022-09-21 NOTE — PROGRESS NOTES
RT Inhaler-Nebulizer Bronchodilator Protocol Note    There is a bronchodilator order in the chart from a provider indicating to follow the RT Bronchodilator Protocol and there is an Initiate RT Inhaler-Nebulizer Bronchodilator Protocol order as well (see protocol at bottom of note). CXR Findings:  XR CHEST PORTABLE    Result Date: 9/20/2022  1. Slight worsening small right apical pneumothorax. The findings from the last RT Protocol Assessment were as follows:   History Pulmonary Disease: Chronic pulmonary disease  Respiratory Pattern: Dyspnea on exertion or RR 21-25 bpm  Breath Sounds: Slightly diminished and/or crackles  Cough: Strong, spontaneous, non-productive  Indication for Bronchodilator Therapy: Decreased or absent breath sounds  Bronchodilator Assessment Score: 6    Aerosolized bronchodilator medication orders have been revised according to the RT Inhaler-Nebulizer Bronchodilator Protocol below. Respiratory Therapist to perform RT Therapy Protocol Assessment initially then follow the protocol. Repeat RT Therapy Protocol Assessment PRN for score 0-3 or on second treatment, BID, and PRN for scores above 3. No Indications - adjust the frequency to every 6 hours PRN wheezing or bronchospasm, if no treatments needed after 48 hours then discontinue using Per Protocol order mode. If indication present, adjust the RT bronchodilator orders based on the Bronchodilator Assessment Score as indicated below. Use Inhaler orders unless patient has one or more of the following: on home nebulizer, not able to hold breath for 10 seconds, is not alert and oriented, cannot activate and use MDI correctly, or respiratory rate 25 breaths per minute or more, then use the equivalent nebulizer order(s) with same Frequency and PRN reasons based on the score. If a patient is on this medication at home then do not decrease Frequency below that used at home.     0-3 - enter or revise RT bronchodilator order(s) to · Patient: [TRISTAN NESS] ......... Discharge Date: [9/12/2018] ......... [facility] Facility: [[facility]] ......... Disposition: [Home or Self Care] ......... LACE Score: [11] ......... Primary Diagnosis: [] ......... Admission Diagnosis: [Shortness of breath, Positive D dimer, Chest pain at rest, Pleural effusion on right]   equivalent RT Bronchodilator order with Frequency of every 4 hours PRN for wheezing or increased work of breathing using Per Protocol order mode. 4-6 - enter or revise RT Bronchodilator order(s) to two equivalent RT bronchodilator orders with one order with BID Frequency and one order with Frequency of every 4 hours PRN wheezing or increased work of breathing using Per Protocol order mode. 7-10 - enter or revise RT Bronchodilator order(s) to two equivalent RT bronchodilator orders with one order with TID Frequency and one order with Frequency of every 4 hours PRN wheezing or increased work of breathing using Per Protocol order mode. 11-13 - enter or revise RT Bronchodilator order(s) to one equivalent RT bronchodilator order with QID Frequency and an Albuterol order with Frequency of every 4 hours PRN wheezing or increased work of breathing using Per Protocol order mode. Greater than 13 - enter or revise RT Bronchodilator order(s) to one equivalent RT bronchodilator order with every 4 hours Frequency and an Albuterol order with Frequency of every 2 hours PRN wheezing or increased work of breathing using Per Protocol order mode.        Electronically signed by Jai Escobar RCP on 9/21/2022 at 7:41 PM

## 2022-09-21 NOTE — PROGRESS NOTES
Pulmonary & Critical Care Medicine Progress Note  Hospital Day: 13   CC: COVID, pneumothorax    Subjective:   resolved ptx. No bubbling/air leak. PHYSICAL EXAM:  Blood pressure 112/73, pulse 68, temperature 97.3 °F (36.3 °C), temperature source Axillary, resp. rate 18, height 5' 3\" (1.6 m), weight 108 lb 2 oz (49 kg), SpO2 97 %, not currently breastfeeding. on 2L   Constitutional:  No acute distress. HENT:  Oropharynx is clear and moist.   Neck: No tracheal deviation present. Cardiovascular: Normal heart sounds. No lower extremity edema. Pulmonary/Chest: No wheezes. No rhonchi. No rales. No decreased breath sounds. No accessory muscle usage or stridor. Musculoskeletal: No cyanosis. No clubbing. Skin: Skin is warm and dry. Psychiatric: Normal mood and affect. Neurologic: speech fluent, alert and oriented, strength symmetric      Scheduled Meds:   enoxaparin  40 mg SubCUTAneous Daily    albuterol  2.5 mg Nebulization TID    benzonatate  100 mg Oral TID    acetaminophen  1,000 mg Oral Once    atorvastatin  40 mg Oral Daily    guaiFENesin  1,200 mg Oral QAM    levothyroxine  125 mcg Oral Daily    magnesium oxide  400 mg Oral Daily    nadolol  20 mg Oral Daily    pantoprazole  40 mg Oral QAM AC    insulin lispro  0-4 Units SubCUTAneous TID WC    insulin lispro  0-4 Units SubCUTAneous Nightly    sodium chloride flush  5-40 mL IntraVENous 2 times per day    sertraline  200 mg Oral Daily    Pirfenidone  3 tablet Oral TID     Data:  CBC:   Recent Labs     09/19/22 0448 09/20/22 0427 09/21/22 0438   WBC 9.2 11.0 11.2*   HGB 8.9* 8.5* 8.7*   HCT 27.6* 27.1* 27.2*   MCV 82.7 83.4 82.4    234 224       BMP:   Recent Labs     09/19/22 0449 09/20/22 0427 09/21/22  0438    139 138   K 3.6 3.3* 4.0    101 101   CO2 30 30 28   BUN 15 14 14   CREATININE <0.5* <0.5* <0.5*       LIVER PROFILE:   No results for input(s): AST, ALT, LIPASE, BILIDIR, BILITOT, ALKPHOS in the last 72 hours.     Invalid input(s): AMYLASE,  ALB    Microbiology:  9/9/2022 SARS-CoV-2 POSITIVE  and influenza negative  9/9/2022 blood NGTD    Imaging:  CTPA 9/9/22:  bullous emphysema, pulmonary fibrosis, right-sided pneumothorax    pCXR 9/11/22 AM:  small right apical pneumothorax, possible retraction of chest tube    pCXR 9/13/22 AM:  Worsening small to moderate right apical pneumothorax.   (found tube had been clamped)     pCXR 9/13/22 After unclamping:  Complete resolution of the previously noted right apical pneumothorax. pCXR 9/14/22 AM:  No pneumothorax on the right. Pleural drainage catheter seen on the right. Fibrotic changes in the lungs, similar to prior     pCXR 9/14/22 with tube clamped 4 hrs:  small apical PTX     pCXR 9/15/22 AM:  Slight interval retraction of right pleural catheter with increasing volume of right pneumothorax. Replacement or repositioning of the pleural catheter is advised. pCXR 9/16/22 AM:  New right chest tube in place. Resolution of right  pneumothorax. Soft tissue emphysema again seen in the right chest wall. No new airspace disease. pCXR 9/17/22 AM:  Similar appearing chest.     pCXR 9/17/22 after 3 hour pneumostat trial: new right moderate pneumothorax, chest tube is more inferiorly located than prior    pCXR 9/18/22 AM:  Near complete resolution of right pneumothorax with persistent pleural catheter. Tiny residual apical pneumothorax noted.     ASSESSMENT:  Acute on chronic hypoxemic respiratory failure, baseline 3-4 L O2  COVID-19 pneumonia  Secondary right-sided spontaneous pneumothorax   Right chest tube #1 9/9/22 - 9/15/22  Right chest tube #2 9/15/22   ILD, h/o  & NSIP v chronic HP now with acute exacerbation   Severe bullous changes/paraseptal emphysema - no tobacco history   Chronic diastolic CHF  B91-S9 PDFF withL1 corpectomy @  8/22/22 for L1 burst fracture      PLAN:  Place Chest tube to water seal  Supplemental oxygen to maintain SaO2 >92%; wean as tolerated   S/P 8 days Decadron 6 mg   Home Pirfenidone   Inhaled bronchodilators   Daily CXR

## 2022-09-21 NOTE — FLOWSHEET NOTE
09/21/22 0753   Vital Signs   Temp 97.7 °F (36.5 °C)   Temp Source Oral   Heart Rate 73   Heart Rate Source Monitor   Resp 20   BP 96/62   BP Location Right upper arm   BP Method Automatic   MAP (Calculated) 73.33   Patient Position High fowlers   Level of Consciousness 0   MEWS Score 2   Oxygen Therapy   SpO2 93 %   O2 Device Nasal cannula   Pt. Resting in bed. Call light in reach. Shift assessment completed see flow sheet. Denies any needs at this time. Will continue to monitor.

## 2022-09-21 NOTE — PLAN OF CARE
Problem: Discharge Planning  Goal: Discharge to home or other facility with appropriate resources  9/21/2022 0849 by Fermin Molina RN  Outcome: Progressing  9/21/2022 0652 by Ted Lazo RN  Outcome: Progressing     Problem: Safety - Adult  Goal: Free from fall injury  9/21/2022 0849 by Fermin Molina RN  Outcome: Progressing  9/21/2022 0652 by Ted Lazo RN  Outcome: Progressing     Problem: Skin/Tissue Integrity  Goal: Absence of new skin breakdown  Description: 1. Monitor for areas of redness and/or skin breakdown  2. Assess vascular access sites hourly  3. Every 4-6 hours minimum:  Change oxygen saturation probe site  4. Every 4-6 hours:  If on nasal continuous positive airway pressure, respiratory therapy assess nares and determine need for appliance change or resting period.   9/21/2022 0849 by Fermin Molina RN  Outcome: Progressing  9/21/2022 8248 by Ted Lazo RN  Outcome: Progressing     Problem: Nutrition Deficit:  Goal: Optimize nutritional status  9/21/2022 0849 by Fermin Molina RN  Outcome: Progressing  9/21/2022 2437 by Ted Lazo RN  Outcome: Progressing     Problem: Chronic Conditions and Co-morbidities  Goal: Patient's chronic conditions and co-morbidity symptoms are monitored and maintained or improved  9/21/2022 0849 by Fermin Molina RN  Outcome: Progressing  9/21/2022 0652 by Ted Lazo RN  Outcome: Progressing     Problem: Pain  Goal: Verbalizes/displays adequate comfort level or baseline comfort level  Outcome: Progressing     Problem: ABCDS Injury Assessment  Goal: Absence of physical injury  Outcome: Progressing

## 2022-09-21 NOTE — FLOWSHEET NOTE
09/20/22 1947 09/20/22 2333   Vital Signs   Temp 97.8 °F (36.6 °C) 97.8 °F (36.6 °C)   Temp Source  --  Oral   Heart Rate  --  78   Resp 20 20   /66 112/63   MAP (Calculated) 79.33 79.33   Patient Position  --  Semi leslywlers   Pain Assessment   Pain Level  --  7   Pain Location  --  Chest   Oxygen Therapy   SpO2  --  93 %   O2 Device  --  Nasal cannula   In to see patient, Nasal canula changed, bed changed, patient requesting turkey sb, she states she has had an improvement in her appetite which is an improvement since yesterday, patient with cough that subsides with interventions

## 2022-09-21 NOTE — PROGRESS NOTES
RT Nebulizer Bronchodilator Protocol Note    There is a bronchodilator order in the chart from a provider indicating to follow the RT Bronchodilator Protocol and there is an Initiate RT Bronchodilator Protocol order as well (see protocol at bottom of note). CXR Findings:  XR CHEST PORTABLE    Result Date: 9/20/2022  1. Slight worsening small right apical pneumothorax. The findings from the last RT Protocol Assessment were as follows:  Smoking: Chronic pulmonary disease  Respiratory Pattern: Dyspnea on exertion or RR 21-25 bpm  Breath Sounds: Slightly diminished and/or crackles  Cough: Strong, spontaneous, non-productive  Indication for Bronchodilator Therapy: Decreased or absent breath sounds  Bronchodilator Assessment Score: 6    Aerosolized bronchodilator medication orders have been revised according to the RT Nebulizer Bronchodilator Protocol below. Respiratory Therapist to perform RT Therapy Protocol Assessment initially then follow the protocol. Repeat RT Therapy Protocol Assessment PRN for score 0-3 or on second treatment, BID, and PRN for scores above 3. No Indications - adjust the frequency to every 6 hours PRN wheezing or bronchospasm, if no treatments needed after 48 hours then discontinue using Per Protocol order mode. If indication present, adjust the RT bronchodilator orders based on the Bronchodilator Assessment Score as indicated below. If a patient is on this medication at home then do not decrease Frequency below that used at home. 0-3 - enter or revise RT bronchodilator order(s) to equivalent RT Bronchodilator order with Frequency of every 4 hours PRN for wheezing or increased work of breathing using Per Protocol order mode. 4-6 - enter or revise RT Bronchodilator order(s) to two equivalent RT bronchodilator orders with one order with BID Frequency and one order with Frequency of every 4 hours PRN wheezing or increased work of breathing using Per Protocol order mode. 7-10 - enter or revise RT Bronchodilator order(s) to two equivalent RT bronchodilator orders with one order with TID Frequency and one order with Frequency of every 4 hours PRN wheezing or increased work of breathing using Per Protocol order mode. 11-13 - enter or revise RT Bronchodilator order(s) to one equivalent RT bronchodilator order with QID Frequency and an Albuterol order with Frequency of every 4 hours PRN wheezing or increased work of breathing using Per Protocol order mode. Greater than 13 - enter or revise RT Bronchodilator order(s) to one equivalent RT bronchodilator order with every 4 hours Frequency and an Albuterol order with Frequency of every 2 hours PRN wheezing or increased work of breathing using Per Protocol order mode. RT to enter RT Home Evaluation for COPD & MDI Assessment order using Per Protocol order mode.     Electronically signed by Nubia Menezes RCP on 9/21/2022 at 7:42 AM

## 2022-09-21 NOTE — PROGRESS NOTES
Hospitalist Progress Note      PCP: Kyler Black MD    Date of Admission: 9/9/2022    Subjective:     Denies SOB this am. Feels well. Oxygenation stable. Pt reports her back brace is in the closet here and she is worried about wearing it because of the chest tube. Medications:  Reviewed    Infusion Medications    dextrose      sodium chloride       Scheduled Medications    enoxaparin  40 mg SubCUTAneous Daily    albuterol  2.5 mg Nebulization TID    benzonatate  100 mg Oral TID    acetaminophen  1,000 mg Oral Once    atorvastatin  40 mg Oral Daily    guaiFENesin  1,200 mg Oral QAM    levothyroxine  125 mcg Oral Daily    magnesium oxide  400 mg Oral Daily    nadolol  20 mg Oral Daily    pantoprazole  40 mg Oral QAM AC    insulin lispro  0-4 Units SubCUTAneous TID WC    insulin lispro  0-4 Units SubCUTAneous Nightly    sodium chloride flush  5-40 mL IntraVENous 2 times per day    sertraline  200 mg Oral Daily    Pirfenidone  3 tablet Oral TID     PRN Meds: calcium carbonate, benzonatate, loperamide, guaiFENesin-dextromethorphan, albuterol sulfate HFA, hydrOXYzine HCl, glucose, dextrose bolus **OR** dextrose bolus, glucagon (rDNA), dextrose, sodium chloride flush, sodium chloride, ondansetron **OR** ondansetron, polyethylene glycol, acetaminophen **OR** acetaminophen, morphine, morphine, mirtazapine, methocarbamol      Intake/Output Summary (Last 24 hours) at 9/21/2022 9381  Last data filed at 9/21/2022 0401  Gross per 24 hour   Intake --   Output 100 ml   Net -100 ml         Physical Exam Performed:    /73   Pulse 68   Temp 97.3 °F (36.3 °C) (Axillary)   Resp 18   Ht 5' 3\" (1.6 m)   Wt 108 lb 2 oz (49 kg)   SpO2 97%   BMI 19.15 kg/m²     Gen: No distress. Alert. Awake, oriented . Thin , elderly female . Looks ill , fatigued   Eyes: PERRL. No sclera icterus. No conjunctival injection. ENT: No discharge. Pharynx clear. Neck: No JVD. Trachea midline.   Resp: No accessory muscle use. Diminished breath sounds . No crackles. No wheezes. No rhonchi. R chest tube to suction   CV: Regular rate. Regular rhythm. No murmur. No rub. No edema. Capillary Refill: Brisk,< 3 seconds   Peripheral Pulses: +2 palpable, equal bilaterally   GI: Non-tender. Non-distended. Normal bowel sounds. Skin: Warm and dry. No nodule on exposed extremities. No rash on exposed extremities. M/S: No cyanosis. No joint deformity. No clubbing. Neuro: Awake. Grossly nonfocal    Psych: Oriented x 3. No anxiety or agitation. Labs:   Recent Labs     09/19/22 0448 09/20/22 0427 09/21/22  0438   WBC 9.2 11.0 11.2*   HGB 8.9* 8.5* 8.7*   HCT 27.6* 27.1* 27.2*    234 224       Recent Labs     09/19/22 0449 09/20/22 0427 09/21/22  0438    139 138   K 3.6 3.3* 4.0    101 101   CO2 30 30 28   BUN 15 14 14   CREATININE <0.5* <0.5* <0.5*   CALCIUM 8.4 8.6 8.5       No results for input(s): AST, ALT, BILIDIR, BILITOT, ALKPHOS in the last 72 hours. No results for input(s): INR in the last 72 hours. No results for input(s): Chitra Mould in the last 72 hours. Urinalysis:      Lab Results   Component Value Date/Time    NITRU Negative 09/09/2022 05:49 PM    WBCUA 3-5 05/28/2022 06:39 AM    BACTERIA Rare 05/28/2022 06:39 AM    RBCUA 0-2 05/28/2022 06:39 AM    BLOODU Negative 09/09/2022 05:49 PM    SPECGRAV 1.015 09/09/2022 05:49 PM    GLUCOSEU Negative 09/09/2022 05:49 PM       Radiology:  XR CHEST 1 VIEW   Final Result   No evidence of a pneumothorax nor pleural effusion. Bilateral interstitial lung fibrosis. XR CHEST PORTABLE   Final Result   1. Slight worsening small right apical pneumothorax. XR CHEST PORTABLE   Final Result   In this patient with a diffuse pattern of chronic interstitial change, there   is near complete resolution of right apical pneumothorax. XR CHEST PORTABLE   Final Result   Near complete resolution of right apical pneumothorax.   Diffuse interstitial   changes are otherwise noted throughout the lungs. XR CHEST PORTABLE   Final Result   New moderate right pneumothorax. The right chest tube is now more inferiorly   located than before. The chest appears otherwise stable. XR CHEST PORTABLE   Final Result   No right-sided pneumothorax, within indwelling chest tube noted. Extensive interstitial infiltrates are seen, similar to the prior exam.         XR CHEST PORTABLE   Final Result   Similar appearing chest.         XR CHEST PORTABLE   Final Result   New right chest tube in place, resolution of right pneumothorax         CT GUIDED CHEST TUBE   Final Result   Successful CT guided placement of a right chest tube. CT GUIDED NEEDLE PLACEMENT   Final Result   Successful CT guided placement of a right chest tube. XR CHEST PORTABLE   Final Result   Slight interval retraction of right pleural catheter with increasing volume   of right pneumothorax. Replacement or repositioning of the pleural catheter   is advised. Findings were called at 8:09 a.m. .  At 8:24 a.m., findings were discussed   with Colten Claire RN, on 9/15/2022. XR CHEST PORTABLE   Final Result   No change position of the right chest tube with a new small right apical   pneumothorax and new subcutaneous air along the right chest wall. Some of   the holes in the pigtail catheter are within the chest wall. Otherwise stable appearance of chest with diffuse interstitial prominence. XR CHEST PORTABLE   Final Result   No pneumothorax on the right. Pleural drainage catheter seen on the right      Fibrotic changes in the lungs, similar to prior         XR CHEST PORTABLE   Final Result   1. Complete resolution of the previously noted right apical pneumothorax. XR CHEST PORTABLE   Final Result   1. Worsening small to moderate right apical pneumothorax.          XR CHEST PORTABLE   Final Result   Diffuse bilateral honeycomb lung disease. No pneumothorax evident. Pigtail   partially in the right pleural space. XR CHEST PORTABLE   Final Result   Partial retraction of right chest tube. Distal side port may be extra   thoracic. There is perhaps minimal subcutaneous air. Small right apical pneumothorax, decreased in the interval.      Stable extensive interstitial pattern. XR CHEST PORTABLE   Final Result   Increased size of right-sided pneumothorax measuring 1.6 cm with stable   positioning of the right apically oriented pigtail thoracostomy tube. The findings were sent to the [facility] Results Communication Team at 15:57 on   9/10/2022 to be communicated to a licensed caregiver. XR CHEST PORTABLE   Final Result   Right chest tube is in normal position. No pneumothorax is evident. Interstitial opacities, correlating with extensive emphysematous disease. XR CHEST PORTABLE   Final Result   Interval placement of a locking loop chest tube at the right lung apex with   resolved pneumothorax. CT CHEST PULMONARY EMBOLISM W CONTRAST   Final Result   No evidence of pulmonary embolism      Right apical, anterior and lateral pneumothorax approximately 30%. Chest tube   extends along the right lateral chest wall however tip is extrapleural and   lateral to the upper ribcage      Coronary artery/atherosclerotic disease. Cardiomegaly      Extensive bullous changes and pulmonary fibrosis      Small right pleural effusion      RECOMMENDATIONS:   Re-evaluate for reinsertion of right chest tube         XR CHEST PORTABLE   Final Result   Nearly completely resolved right pneumothorax. Chest tube along the right   lateral hemithorax however tip appears extrapleural.         XR CHEST PORTABLE   Final Result   1. Moderate right-sided pneumothorax with approximately 4 cm of pleural   separation at the lung apex. No definite evidence of tension morphology at   this time.       2.  Chronic interstitial lung disease         XR CHEST 1 VIEW    (Results Pending)           Assessment/Plan:    Active Hospital Problems    Diagnosis     COVID-19 [U07.1]      Priority: Medium    Secondary spontaneous pneumothorax [J93.12]      Priority: Medium    Pneumonia due to COVID-19 virus [U07.1, J12.82]      Priority: Medium    Primary spontaneous pneumothorax [J93.11]      Priority: Medium    Acute respiratory failure with hypoxia (HCC) [J96.01]     Acute on chronic respiratory failure with hypoxemia (HCC) [J96.21]             Acute on chronic respiratory failure  Hx of ILD on 3 L at home  Covid 19 infection   Right pneumothorax  -seen by pulmonology  -pt with hx of Pulmonary fibrosis  and Hx of cryptogenic organizing pneumonia with chronic resp failure with recurrent admissions  - imaging with right sided pneumothorax likely induced by cough   -> s.p chest tube placement   - doubt hypoxia due to covid , as it improved since chest tube is placed and now back on home level  - can wean steroids  - resumed Pirfenidone   -Continue Decadron 6 mg daily  -Was requiring O2 up to 5 L , oxygen saturation now stable at baseline O2 4 L  - 9/15 worsening pneumothorax ; chest tube not in position.  -> it was replaced with new chest tube ;   Pulmonology contacted cardiothoracic surgery for possible transfer to [facility]; recommendation was to place pneumostat   - pneumostat placed 9/17, repeat CXR with pneumotx and placed back on chest tube with suction  - CXR today stable       Chronic diastolic CHF  last echo as above, from 5/6/22 EF 55%  -BNP mildly elevated  - pt was taken of diuretics recently for weight loss   - monitor for fluid overload and resume lasix as needed only        S.p recent L1 burst fracture   -  s/p T11-L3 PDFF with L1 corpectomy at  ( 8/22)       DM type 2  -Has been taken off diuretics and diabetic meds for progressive weight loss   - hyperglycemia with steroids  -SSI  -continue to monitor BG       HTN  -BP stable  -on nadolol       HLD  -continue statin       Hypothyroidism  -continue synthroid       Iron Deficiency Anemia  - Recent Hemorrhoidal bleed -recent colonoscopy neg  - hb at 8 .4  -continue PO iron  -Hgb stable       Depression  Anxiety  -continue zoloft  -prn ativan         Diarrhea  -Imodium as needed  Cut down intake of tomato soup  Diarrhea resolved           DVT Prophylaxis: Lovenox  Diet: ADULT DIET;  Regular; 4 carb choices (60 gm/meal)  ADULT ORAL NUTRITION SUPPLEMENT; Breakfast, Dinner, HS Snack; Diabetic Oral Supplement  Code Status: Full Code  PT/OT Eval Status: ordered    Miguelito Xavier MD

## 2022-09-21 NOTE — FLOWSHEET NOTE
09/20/22 1947   Vital Signs   Temp 97.8 °F (36.6 °C)   Resp 20   /66   MAP (Calculated) 79.33   Oxygen Therapy   SpO2 92 %   O2 Device Nasal cannula   Patient Observation   Observations spo2 98% on 3l n/c    At bedside, shift assessment completed.   Patient awake and oriented, no needs at this time, asking to bring pain medication when available, Up to toilet with aide, no complaints at this time

## 2022-09-21 NOTE — PROGRESS NOTES
Occupational Therapy Daily Treatment Note    Unit: PCU  Date:  9/21/2022  Patient Name:    Gabriella Torre  Admitting diagnosis:  Pneumonia due to COVID-19 virus [U07.1, J12.82]  Admit Date:  9/9/2022  Precautions/Restrictions:     Fall risk, Bed/chair alarm, Lines -IV, Supplemental O2 (3L), and Drains (R chest tube), Telemetry, and Continuous pulse oximetry      Discharge Recommendations: SNF  DME needs for discharge: defer to facility       Therapy recommendations for staff:   Assist of 1 with use of rolling walker (RW) for all transfers to/from [facility]  to/from chair with gait belt     AM-PAC Score: AM-PAC Inpatient Daily Activity Raw Score: 14  Home Health S4 Level: NA       Treatment Time:  1101-1807  Treatment number:  2    Timed code treatment minutes: 38 minutes  Total treatment minutes:   38 minutes    History of Present Illness:   Per Dr. Jasrpeet Rosales:  Patient is a 69 y.o. female with  cryptongenic organizing pneumonia, pulm fibrosis , chronic hypoxic resp failure , atrial fibrillation, diastolic CHF,  HLD, hypothyroidism, GERD, HLD, depression, anxiety, chronic back pain who presented to ER for increasing sob for last few days     Recently had spine surgery for L1 burst fracture and did rehab at [facility] and went home about a week ago . Reports she developed slow increasing sob and using upto 6 L o2 to keep her spo2 above 90 %. No fevers or chills but has significant cough. Workup in ER showed hypoxia and new right sided pneumothorax and covid positive. Right sided chest tube placed and admitted to ICU for eval      Pt reports she feels fine since her chest tube was placed. Pain controlled. No cough today   Has been taken off diuretics and diabetic meds for progressive weight loss   Subjective:  Pt in the bed and stated she had been up sitting in the chair for extended time today and did not want to get up again.      Pain   Yes  Rating: mild  Location:chest   Pain Medicine Status: No request made      Bed Mobility: - pt declined   Supine to Sit:  Not Tested  Sit to Supine:  Not Tested  Rolling:           Not Tested  Scooting:        Not Tested    Transfer Training:   Sit to stand:   Not Tested  Stand to sit:  Not Tested  Bed to Chair:  Not Tested  Bed to [facility]:   Not Tested  Standard toilet:   Not Tested    Activity Tolerance   Pt completed therapy session with some dizziness     ADL Training:   Upper body dressing: Not Tested  Upper body bathing:  Independent bathing face and UEs   Lower body dressing:  Not Tested  Lower body bathing:  Not Tested  Toileting:   Not Tested  Grooming/Hygiene:  Pt able to IND brush teeth when in bed     Therapeutic Exercise:   Hand flex/ext:  x10x  Wrist flex/ext:  x10  Elbow flex/ext  Shoulder flex/ext:  x10  Shoulder shrugs:  x10    Patient Education:   Role of OT    Positioning Needs:   Pt in bed, alarm set, positioned in proper neutral alignment and pressure relief provided. Family Present:  No    Assessment:   Pt in the bed and stated she did not want to get up or sit at the edge of the bed for ADLS/ Pt reporting some min pain in chest and some dizziness. Pt willing to participate in some UE exercises and UE bathing / grooming tasks in bed. Pt needs continued OT to improve functional IND. GOALS  To be met in 3 Visits:  1). Bed to toilet/BSC: Min A and with use of RW     To be met in 5 Visits:  1). Supine to/from Sit:             Supervision  2). Upper Body Bathing:         Supervision  3). Lower Body Bathing:         Min A  4). Upper Body Dressing:       Supervision  5). Lower Body Dressing:       Min A  6).  Pt to demonstrate UE exs x 15 reps with minimal cues      Plan: cont with POC      Olinda Cherry OTR/L [id number]       If patient discharges from this facility prior to next visit, this note will serve as the Discharge Summary

## 2022-09-21 NOTE — PROGRESS NOTES
Morning meds provided. No needs at this time, patient states that she ha been resting off and on, no need for pain medication at this time.   Call light within reach if needed

## 2022-09-21 NOTE — PLAN OF CARE
Problem: Discharge Planning  Goal: Discharge to home or other facility with appropriate resources  Outcome: Progressing     Problem: Safety - Adult  Goal: Free from fall injury  Outcome: Progressing     Problem: Skin/Tissue Integrity  Goal: Absence of new skin breakdown  Description: 1. Monitor for areas of redness and/or skin breakdown  2. Assess vascular access sites hourly  3. Every 4-6 hours minimum:  Change oxygen saturation probe site  4. Every 4-6 hours:  If on nasal continuous positive airway pressure, respiratory therapy assess nares and determine need for appliance change or resting period.   Outcome: Progressing     Problem: Nutrition Deficit:  Goal: Optimize nutritional status  Outcome: Progressing     Problem: Chronic Conditions and Co-morbidities  Goal: Patient's chronic conditions and co-morbidity symptoms are monitored and maintained or improved  Outcome: Progressing

## 2022-09-22 ENCOUNTER — APPOINTMENT (OUTPATIENT)
Dept: GENERAL RADIOLOGY | Age: 67
DRG: 177 | End: 2022-09-22
Payer: MEDICARE

## 2022-09-22 LAB
ALPHA-1 ANTITRYPSIN: 188 MG/DL (ref 90–200)
GLUCOSE BLD-MCNC: 106 MG/DL (ref 70–99)
GLUCOSE BLD-MCNC: 111 MG/DL (ref 70–99)
GLUCOSE BLD-MCNC: 89 MG/DL (ref 70–99)
GLUCOSE BLD-MCNC: 95 MG/DL (ref 70–99)
PERFORMED ON: ABNORMAL
PERFORMED ON: ABNORMAL
PERFORMED ON: NORMAL
PERFORMED ON: NORMAL

## 2022-09-22 PROCEDURE — 2060000000 HC ICU INTERMEDIATE R&B

## 2022-09-22 PROCEDURE — 6360000002 HC RX W HCPCS: Performed by: INTERNAL MEDICINE

## 2022-09-22 PROCEDURE — 2580000003 HC RX 258: Performed by: INTERNAL MEDICINE

## 2022-09-22 PROCEDURE — 6370000000 HC RX 637 (ALT 250 FOR IP): Performed by: INTERNAL MEDICINE

## 2022-09-22 PROCEDURE — 97110 THERAPEUTIC EXERCISES: CPT

## 2022-09-22 PROCEDURE — 6360000002 HC RX W HCPCS

## 2022-09-22 PROCEDURE — 99233 SBSQ HOSP IP/OBS HIGH 50: CPT | Performed by: INTERNAL MEDICINE

## 2022-09-22 PROCEDURE — 99232 SBSQ HOSP IP/OBS MODERATE 35: CPT | Performed by: INTERNAL MEDICINE

## 2022-09-22 PROCEDURE — 71045 X-RAY EXAM CHEST 1 VIEW: CPT

## 2022-09-22 PROCEDURE — 2700000000 HC OXYGEN THERAPY PER DAY

## 2022-09-22 PROCEDURE — 94761 N-INVAS EAR/PLS OXIMETRY MLT: CPT

## 2022-09-22 PROCEDURE — 94640 AIRWAY INHALATION TREATMENT: CPT

## 2022-09-22 PROCEDURE — 97530 THERAPEUTIC ACTIVITIES: CPT

## 2022-09-22 RX ORDER — ENOXAPARIN SODIUM 100 MG/ML
30 INJECTION SUBCUTANEOUS DAILY
Status: DISCONTINUED | OUTPATIENT
Start: 2022-09-23 | End: 2022-09-23 | Stop reason: HOSPADM

## 2022-09-22 RX ADMIN — ATORVASTATIN CALCIUM 40 MG: 40 TABLET, FILM COATED ORAL at 09:31

## 2022-09-22 RX ADMIN — SODIUM CHLORIDE, PRESERVATIVE FREE 10 ML: 5 INJECTION INTRAVENOUS at 19:50

## 2022-09-22 RX ADMIN — ALBUTEROL SULFATE 2.5 MG: 2.5 SOLUTION RESPIRATORY (INHALATION) at 13:39

## 2022-09-22 RX ADMIN — PIRFENIDONE 3 TABLET: 267 TABLET, FILM COATED ORAL at 19:50

## 2022-09-22 RX ADMIN — ALBUTEROL SULFATE 2.5 MG: 2.5 SOLUTION RESPIRATORY (INHALATION) at 07:29

## 2022-09-22 RX ADMIN — MAGNESIUM GLUCONATE 500 MG ORAL TABLET 400 MG: 500 TABLET ORAL at 09:31

## 2022-09-22 RX ADMIN — ALBUTEROL SULFATE 2.5 MG: 2.5 SOLUTION RESPIRATORY (INHALATION) at 20:33

## 2022-09-22 RX ADMIN — GUAIFENESIN 1200 MG: 600 TABLET, EXTENDED RELEASE ORAL at 09:30

## 2022-09-22 RX ADMIN — NADOLOL 20 MG: 40 TABLET ORAL at 09:30

## 2022-09-22 RX ADMIN — PIRFENIDONE 3 TABLET: 267 TABLET, FILM COATED ORAL at 11:23

## 2022-09-22 RX ADMIN — PANTOPRAZOLE SODIUM 40 MG: 40 TABLET, DELAYED RELEASE ORAL at 05:43

## 2022-09-22 RX ADMIN — MORPHINE SULFATE 4 MG: 4 INJECTION, SOLUTION INTRAMUSCULAR; INTRAVENOUS at 18:26

## 2022-09-22 RX ADMIN — MORPHINE SULFATE 4 MG: 4 INJECTION, SOLUTION INTRAMUSCULAR; INTRAVENOUS at 00:40

## 2022-09-22 RX ADMIN — SODIUM CHLORIDE, PRESERVATIVE FREE 10 ML: 5 INJECTION INTRAVENOUS at 09:31

## 2022-09-22 RX ADMIN — BENZONATATE 100 MG: 100 CAPSULE ORAL at 09:31

## 2022-09-22 RX ADMIN — BENZONATATE 100 MG: 100 CAPSULE ORAL at 00:39

## 2022-09-22 RX ADMIN — PIRFENIDONE 3 TABLET: 267 TABLET, FILM COATED ORAL at 09:31

## 2022-09-22 RX ADMIN — MORPHINE SULFATE 4 MG: 4 INJECTION, SOLUTION INTRAMUSCULAR; INTRAVENOUS at 23:06

## 2022-09-22 RX ADMIN — LEVOTHYROXINE SODIUM 125 MCG: 125 TABLET ORAL at 05:44

## 2022-09-22 RX ADMIN — MORPHINE SULFATE 4 MG: 4 INJECTION, SOLUTION INTRAMUSCULAR; INTRAVENOUS at 11:26

## 2022-09-22 RX ADMIN — BENZONATATE 100 MG: 100 CAPSULE ORAL at 13:31

## 2022-09-22 RX ADMIN — BENZONATATE 100 MG: 100 CAPSULE ORAL at 23:01

## 2022-09-22 RX ADMIN — SERTRALINE 200 MG: 100 TABLET, FILM COATED ORAL at 09:30

## 2022-09-22 RX ADMIN — BENZONATATE 100 MG: 100 CAPSULE ORAL at 19:50

## 2022-09-22 RX ADMIN — ENOXAPARIN SODIUM 40 MG: 100 INJECTION SUBCUTANEOUS at 09:31

## 2022-09-22 ASSESSMENT — PAIN DESCRIPTION - ORIENTATION
ORIENTATION: RIGHT
ORIENTATION: RIGHT
ORIENTATION: MID;RIGHT
ORIENTATION: MID;RIGHT

## 2022-09-22 ASSESSMENT — PAIN - FUNCTIONAL ASSESSMENT
PAIN_FUNCTIONAL_ASSESSMENT: ACTIVITIES ARE NOT PREVENTED

## 2022-09-22 ASSESSMENT — PAIN DESCRIPTION - LOCATION
LOCATION: CHEST
LOCATION: CHEST;RIB CAGE
LOCATION: CHEST

## 2022-09-22 ASSESSMENT — PAIN DESCRIPTION - DESCRIPTORS
DESCRIPTORS: SHARP
DESCRIPTORS: TIGHTNESS
DESCRIPTORS: TIGHTNESS
DESCRIPTORS: SHARP

## 2022-09-22 ASSESSMENT — PAIN SCALES - GENERAL
PAINLEVEL_OUTOF10: 8
PAINLEVEL_OUTOF10: 8
PAINLEVEL_OUTOF10: 7
PAINLEVEL_OUTOF10: 8
PAINLEVEL_OUTOF10: 9

## 2022-09-22 NOTE — FLOWSHEET NOTE
09/22/22 0037   Vital Signs   Temp 98.4 °F (36.9 °C)   Temp Source Oral   Heart Rate 79   Heart Rate Source Monitor   Resp 18   /70   BP Location Right upper arm   MAP (Calculated) 81   Level of Consciousness 0   MEWS Score 1   Oxygen Therapy   SpO2 98 %   Pulse Oximeter Device Mode Intermittent   Pulse Oximeter Device Location Finger   O2 Device Nasal cannula   O2 Flow Rate (L/min) 3 L/min     Pt awake in bed. VS as shown above. Pt denies any further needs at this time. Call light in reach and bed in lowest position.

## 2022-09-22 NOTE — PROGRESS NOTES
RT Nebulizer Bronchodilator Protocol Note    There is a bronchodilator order in the chart from a provider indicating to follow the RT Bronchodilator Protocol and there is an Initiate RT Bronchodilator Protocol order as well (see protocol at bottom of note). CXR Findings:  XR CHEST PORTABLE    Result Date: 9/20/2022  1. Slight worsening small right apical pneumothorax. The findings from the last RT Protocol Assessment were as follows:  Smoking: Chronic pulmonary disease  Respiratory Pattern: Dyspnea on exertion or RR 21-25 bpm  Breath Sounds: Slightly diminished and/or crackles  Cough: Strong, spontaneous, non-productive  Indication for Bronchodilator Therapy: Decreased or absent breath sounds  Bronchodilator Assessment Score: 6    Aerosolized bronchodilator medication orders have been revised according to the RT Nebulizer Bronchodilator Protocol below. Respiratory Therapist to perform RT Therapy Protocol Assessment initially then follow the protocol. Repeat RT Therapy Protocol Assessment PRN for score 0-3 or on second treatment, BID, and PRN for scores above 3. No Indications - adjust the frequency to every 6 hours PRN wheezing or bronchospasm, if no treatments needed after 48 hours then discontinue using Per Protocol order mode. If indication present, adjust the RT bronchodilator orders based on the Bronchodilator Assessment Score as indicated below. If a patient is on this medication at home then do not decrease Frequency below that used at home. 0-3 - enter or revise RT bronchodilator order(s) to equivalent RT Bronchodilator order with Frequency of every 4 hours PRN for wheezing or increased work of breathing using Per Protocol order mode. 4-6 - enter or revise RT Bronchodilator order(s) to two equivalent RT bronchodilator orders with one order with BID Frequency and one order with Frequency of every 4 hours PRN wheezing or increased work of breathing using Per Protocol order mode. 7-10 - enter or revise RT Bronchodilator order(s) to two equivalent RT bronchodilator orders with one order with TID Frequency and one order with Frequency of every 4 hours PRN wheezing or increased work of breathing using Per Protocol order mode. 11-13 - enter or revise RT Bronchodilator order(s) to one equivalent RT bronchodilator order with QID Frequency and an Albuterol order with Frequency of every 4 hours PRN wheezing or increased work of breathing using Per Protocol order mode. Greater than 13 - enter or revise RT Bronchodilator order(s) to one equivalent RT bronchodilator order with every 4 hours Frequency and an Albuterol order with Frequency of every 2 hours PRN wheezing or increased work of breathing using Per Protocol order mode.          Electronically signed by Dane King RCP on 9/22/2022 at 7:31 AM

## 2022-09-22 NOTE — FLOWSHEET NOTE
09/22/22 0915   Vital Signs   Temp 97.5 °F (36.4 °C)   Temp Source Axillary   Heart Rate 75   Heart Rate Source Monitor   Resp 18   BP (!) 142/82   MAP (Calculated) 102   Level of Consciousness 0   MEWS Score 1   Oxygen Therapy   SpO2 97 %   O2 Device Nasal cannula   O2 Flow Rate (L/min) 3 L/min   Pt. Resting in bed. Call light in reach. Shift assessment completed see flow sheet. Denies any needs at this time. Will continue to monitor.

## 2022-09-22 NOTE — PROGRESS NOTES
Pt awake in bed. Assessment completed and medications given per MAR. VS as charted in flowsheet. A&Ox4. Pt states pain as a 8 out of 10 PRN Morphine given at this time. Chest tube intact to water seal. Dressing clean, dry and intact. Pt denies any further needs at this time. Call light in reach and bed in lowest position.

## 2022-09-22 NOTE — PROGRESS NOTES
Inpatient Physical Therapy Daily Treatment Note    Unit: PCU  Date:  9/22/2022  Patient Name:    Frederick Marrero  Admitting diagnosis:  Pneumonia due to COVID-19 virus [U07.1, J12.82]  Admit Date:  9/9/2022  Precautions/Restrictions:  Fall risk, Bed/chair alarm, Lines -IV, Supplemental O2 (3L), and Drains (R chest tube), Telemetry, Continuous pulse oximetry, and watch O2 closely      Discharge Recommendations: Home 24 hr assist and with home PT   DME needs for discharge: Needs Met (pt has BSC and RW)       Therapy recommendation for EMS Transport: can transport by wheelchair    Therapy recommendations for staff:   Stand by assist with use of rolling walker (RW) for all transfers and ambulation to/from [facility]  to/from [facility]    History of Present Illness:   Per Dr. Gladis Starkey:  Patient is a 69 y.o. female with  cryptongenic organizing pneumonia, pulm fibrosis , chronic hypoxic resp failure , atrial fibrillation, diastolic CHF,  HLD, hypothyroidism, GERD, HLD, depression, anxiety, chronic back pain who presented to ER for increasing sob for last few days     Recently had spine surgery for L1 burst fracture and did rehab at [facility] and went home about a week ago . Reports she developed slow increasing sob and using upto 6 L o2 to keep her spo2 above 90 %. No fevers or chills but has significant cough. Workup in ER showed hypoxia and new right sided pneumothorax and covid positive. Right sided chest tube placed and admitted to ICU for eval      Pt reports she feels fine since her chest tube was placed. Pain controlled.  No cough today   Has been taken off diuretics and diabetic meds for progressive weight loss     Home Health S4 Level Recommendation: NA  AM-PAC Mobility Score      AM-PAC Inpatient Mobility without Stair Climbing Raw Score : 18    Treatment Time:  8:10 - 8:54  Treatment number: 3  Timed Code Treatment Minutes: 44 minutes  Total Treatment Minutes:  44  minutes    Cognition    A&O orientation not directly assessed. Able to follow 2 step commands    Subjective  Patient lying supine in bed with no family present   Pt agreeable to this PT tx. Pt reports she is getting up to [facility] more frequently instead of using bedpan. Pain   No  Location:   Rating:    NA/10  Pain Medicine Status: Denies need     Bed Mobility   Supine to Sit:    Supervision  Sit to Supine:   Not Tested  Rolling:   Not Tested  Scooting:   Supervision    Transfer Training     Sit to stand:   Supervision  Stand to sit:   Supervision  Bed to Chair:   Supervision with use of rolling walker (RW)    Gait Training gait completed as indicated below  Distance:      5 ft   Deviations (firm surface/linoleum):  decreased mike, narrow BRY, forward flexed posture, step through pattern, and decreased step length bilaterally  Assistive Device Used:    rolling walker (RW)  Level of Assist:    Supervision  Comment: Steady, no LOB    Stair Training deferred, pt unsafe/not appropriate to complete stairs at this time  # of Steps:   N/A  Level of Assist:  Not Tested  UE Support:  NA  Assistive Device:  N/A  Pattern:   N/A  Comments:     Therapeutic Exercise all completed bilaterally unless indicated  All performed supine prior to OOB mobility:  Ankle Pumps x 30 reps  Glut sets x 15 reps  Quad sets x 15 reps  Heel slides x 15 reps  SLR 3 x 5 reps  Hip abduction: x 15 reps    Balance  Sitting:  Good ; SBA  Comments: EOB    Standing: Fair +; CGA and Min A   Comments: with RW    Patient Education      Role of PT, POC, Discharge recommendations, safety awareness, transfer techniques, pursed lip breathing, pacing activity, and calling for assist with mobility. Positioning Needs       Pt up in chair, alarm set, positioned in proper neutral alignment and pressure relief provided. Call light provided and all needs within reach    Activity Tolerance   Pt completed therapy session with No adverse symptoms noted w/activity.   Supine in bed:  SpO2: 95% on 3L  HR: 85bpm    After transfer to chair:  SpO2: 81% on 3L  HR: 97bpm  *Pt used face mask set to 12L O2 to recover from desaturation - inc to 98% within 1 min. Other    Assessment :  Patient with good tolerance and motivation for tx. Demonstrated improved mobility with less assistance needed, however continues to be limited by LEE and desaturation with short distance gait. Pt's biggest limiting factor at this time is her lung functioning and O2 saturation with activity. Changing D/C rec to home with 24hr assist. Pt will have 24hr care from family and has all necessary DME at home to be successful with safe mobility and performing ADLs. Recommending Home 24 hr assist and with home PT upon discharge as patient functioning below baseline level and would benefit from continued therapy services    Goals (all goals ongoing unless otherwise indicated)  To be met in 3 visits:  1). Independent with LE Ex x 10 reps -MET     To be met in 6 visits:  1). Supine to/from sit: Supervision -MET, new goal (9/22): Indep  2). Sit to/from stand: Supervision -MET, new goal (9/22): Mod Indep  3). Bed to chair: Supervision -MET, new goal (9/22): Mod Indep  4). Gait: Ambulate 50 ft.  with  SBA and use of rolling walker (RW) -NOT MET  5). Tolerate B LE exercises 3 sets of 10-15 reps -MET, progress to standing therex    Plan   Continue with plan of care. Signature: Alexandra Nuñez, PT, DPT, OMT-C  #[id number]      If patient discharges from this facility prior to next visit, this note will serve as the Discharge Summary.

## 2022-09-22 NOTE — PROGRESS NOTES
Pulmonary & Critical Care Medicine Progress Note  Hospital Day: 14   CC: COVID, pneumothorax    Subjective:   resolved ptx. No bubbling/air leak on water seal    PHYSICAL EXAM:  Blood pressure (!) 142/82, pulse 75, temperature 97.5 °F (36.4 °C), temperature source Axillary, resp. rate 18, height 5' 3\" (1.6 m), weight 108 lb 2 oz (49 kg), SpO2 97 %, not currently breastfeeding. on 2L   Constitutional:  No acute distress. HENT:  Oropharynx is clear and moist.   Neck: No tracheal deviation present. Cardiovascular: Normal heart sounds. No lower extremity edema. Pulmonary/Chest: No wheezes. No rhonchi. No rales. No decreased breath sounds. No accessory muscle usage or stridor. Musculoskeletal: No cyanosis. No clubbing. Skin: Skin is warm and dry. Psychiatric: Normal mood and affect. Neurologic: speech fluent, alert and oriented, strength symmetric      Scheduled Meds:   enoxaparin  40 mg SubCUTAneous Daily    albuterol  2.5 mg Nebulization TID    benzonatate  100 mg Oral TID    acetaminophen  1,000 mg Oral Once    atorvastatin  40 mg Oral Daily    guaiFENesin  1,200 mg Oral QAM    levothyroxine  125 mcg Oral Daily    magnesium oxide  400 mg Oral Daily    nadolol  20 mg Oral Daily    pantoprazole  40 mg Oral QAM AC    insulin lispro  0-4 Units SubCUTAneous TID WC    insulin lispro  0-4 Units SubCUTAneous Nightly    sodium chloride flush  5-40 mL IntraVENous 2 times per day    sertraline  200 mg Oral Daily    Pirfenidone  3 tablet Oral TID     Data:  CBC:   Recent Labs     09/20/22  0427 09/21/22  0438   WBC 11.0 11.2*   HGB 8.5* 8.7*   HCT 27.1* 27.2*   MCV 83.4 82.4    224       BMP:   Recent Labs     09/20/22  0427 09/21/22  0438    138   K 3.3* 4.0    101   CO2 30 28   BUN 14 14   CREATININE <0.5* <0.5*       LIVER PROFILE:   No results for input(s): AST, ALT, LIPASE, BILIDIR, BILITOT, ALKPHOS in the last 72 hours. Invalid input(s):   AMYLASE,  ALB    Microbiology:  9/9/2022 SARS-CoV-2 POSITIVE  and influenza negative  9/9/2022 blood NGTD    Imaging:  CTPA 9/9/22:  bullous emphysema, pulmonary fibrosis, right-sided pneumothorax    pCXR 9/11/22 AM:  small right apical pneumothorax, possible retraction of chest tube    pCXR 9/13/22 AM:  Worsening small to moderate right apical pneumothorax.   (found tube had been clamped)     pCXR 9/13/22 After unclamping:  Complete resolution of the previously noted right apical pneumothorax. pCXR 9/14/22 AM:  No pneumothorax on the right. Pleural drainage catheter seen on the right. Fibrotic changes in the lungs, similar to prior     pCXR 9/14/22 with tube clamped 4 hrs:  small apical PTX     pCXR 9/15/22 AM:  Slight interval retraction of right pleural catheter with increasing volume of right pneumothorax. Replacement or repositioning of the pleural catheter is advised. pCXR 9/16/22 AM:  New right chest tube in place. Resolution of right  pneumothorax. Soft tissue emphysema again seen in the right chest wall. No new airspace disease. pCXR 9/17/22 AM:  Similar appearing chest.     pCXR 9/17/22 after 3 hour pneumostat trial: new right moderate pneumothorax, chest tube is more inferiorly located than prior    pCXR 9/18/22 AM:  Near complete resolution of right pneumothorax with persistent pleural catheter. Tiny residual apical pneumothorax noted.     ASSESSMENT:  Acute on chronic hypoxemic respiratory failure, baseline 3-4 L O2  COVID-19 pneumonia  Secondary right-sided spontaneous pneumothorax   Right chest tube #1 9/9/22 - 9/15/22  Right chest tube #2 9/15/22   ILD, h/o  & NSIP v chronic HP now with acute exacerbation   Severe bullous changes/paraseptal emphysema - no tobacco history   Chronic diastolic CHF  [id number] PDFF withL1 corpectomy @  8/22/22 for L1 burst fracture      PLAN:  Cl;amp Chest tube and repeat cxr 1400  Supplemental oxygen to maintain SaO2 >92%; wean as tolerated   S/P 8 days Decadron 6 mg   Home Pirfenidone Inhaled bronchodilators   Daily CXR

## 2022-09-22 NOTE — PROGRESS NOTES
Regular rate. Regular rhythm. No murmur. No rub. No edema. Capillary Refill: Brisk,< 3 seconds   Peripheral Pulses: +2 palpable, equal bilaterally   GI: Non-tender. Non-distended. Normal bowel sounds. Skin: Warm and dry. No nodule on exposed extremities. No rash on exposed extremities. M/S: No cyanosis. No joint deformity. No clubbing. Neuro: Awake. Grossly nonfocal    Psych: Oriented x 3. No anxiety or agitation. Labs:   Recent Labs     09/20/22 0427 09/21/22 0438   WBC 11.0 11.2*   HGB 8.5* 8.7*   HCT 27.1* 27.2*    224       Recent Labs     09/20/22 0427 09/21/22 0438    138   K 3.3* 4.0    101   CO2 30 28   BUN 14 14   CREATININE <0.5* <0.5*   CALCIUM 8.6 8.5       No results for input(s): AST, ALT, BILIDIR, BILITOT, ALKPHOS in the last 72 hours. No results for input(s): INR in the last 72 hours. No results for input(s): Rhae Childes in the last 72 hours. Urinalysis:      Lab Results   Component Value Date/Time    NITRU Negative 09/09/2022 05:49 PM    WBCUA 3-5 05/28/2022 06:39 AM    BACTERIA Rare 05/28/2022 06:39 AM    RBCUA 0-2 05/28/2022 06:39 AM    BLOODU Negative 09/09/2022 05:49 PM    SPECGRAV 1.015 09/09/2022 05:49 PM    GLUCOSEU Negative 09/09/2022 05:49 PM       Radiology:  XR CHEST 1 VIEW   Final Result   In a patient with chronic interstitial lung disease, stability of right chest   tube is noted with no pneumothorax. Slightly increasing airspace changes in   the right mid and lower lung zone may represent atelectasis in association   with tube. XR CHEST 1 VIEW   Final Result   No evidence of a pneumothorax nor pleural effusion. Bilateral interstitial lung fibrosis. XR CHEST PORTABLE   Final Result   1. Slight worsening small right apical pneumothorax. XR CHEST PORTABLE   Final Result   In this patient with a diffuse pattern of chronic interstitial change, there   is near complete resolution of right apical pneumothorax. XR CHEST PORTABLE   Final Result   Near complete resolution of right apical pneumothorax. Diffuse interstitial   changes are otherwise noted throughout the lungs. XR CHEST PORTABLE   Final Result   New moderate right pneumothorax. The right chest tube is now more inferiorly   located than before. The chest appears otherwise stable. XR CHEST PORTABLE   Final Result   No right-sided pneumothorax, within indwelling chest tube noted. Extensive interstitial infiltrates are seen, similar to the prior exam.         XR CHEST PORTABLE   Final Result   Similar appearing chest.         XR CHEST PORTABLE   Final Result   New right chest tube in place, resolution of right pneumothorax         CT GUIDED CHEST TUBE   Final Result   Successful CT guided placement of a right chest tube. CT GUIDED NEEDLE PLACEMENT   Final Result   Successful CT guided placement of a right chest tube. XR CHEST PORTABLE   Final Result   Slight interval retraction of right pleural catheter with increasing volume   of right pneumothorax. Replacement or repositioning of the pleural catheter   is advised. Findings were called at 8:09 a.m. .  At 8:24 a.m., findings were discussed   with Kevin Brewer RN, on 9/15/2022. XR CHEST PORTABLE   Final Result   No change position of the right chest tube with a new small right apical   pneumothorax and new subcutaneous air along the right chest wall. Some of   the holes in the pigtail catheter are within the chest wall. Otherwise stable appearance of chest with diffuse interstitial prominence. XR CHEST PORTABLE   Final Result   No pneumothorax on the right. Pleural drainage catheter seen on the right      Fibrotic changes in the lungs, similar to prior         XR CHEST PORTABLE   Final Result   1. Complete resolution of the previously noted right apical pneumothorax. XR CHEST PORTABLE   Final Result   1.  Worsening small to moderate right apical pneumothorax. XR CHEST PORTABLE   Final Result   Diffuse bilateral honeycomb lung disease. No pneumothorax evident. Pigtail   partially in the right pleural space. XR CHEST PORTABLE   Final Result   Partial retraction of right chest tube. Distal side port may be extra   thoracic. There is perhaps minimal subcutaneous air. Small right apical pneumothorax, decreased in the interval.      Stable extensive interstitial pattern. XR CHEST PORTABLE   Final Result   Increased size of right-sided pneumothorax measuring 1.6 cm with stable   positioning of the right apically oriented pigtail thoracostomy tube. The findings were sent to the [facility] Results Communication Team at 15:57 on   9/10/2022 to be communicated to a licensed caregiver. XR CHEST PORTABLE   Final Result   Right chest tube is in normal position. No pneumothorax is evident. Interstitial opacities, correlating with extensive emphysematous disease. XR CHEST PORTABLE   Final Result   Interval placement of a locking loop chest tube at the right lung apex with   resolved pneumothorax. CT CHEST PULMONARY EMBOLISM W CONTRAST   Final Result   No evidence of pulmonary embolism      Right apical, anterior and lateral pneumothorax approximately 30%. Chest tube   extends along the right lateral chest wall however tip is extrapleural and   lateral to the upper ribcage      Coronary artery/atherosclerotic disease. Cardiomegaly      Extensive bullous changes and pulmonary fibrosis      Small right pleural effusion      RECOMMENDATIONS:   Re-evaluate for reinsertion of right chest tube         XR CHEST PORTABLE   Final Result   Nearly completely resolved right pneumothorax. Chest tube along the right   lateral hemithorax however tip appears extrapleural.         XR CHEST PORTABLE   Final Result   1.   Moderate right-sided pneumothorax with approximately 4 cm of pleural   separation at the lung apex.  No definite evidence of tension morphology at   this time.       2.  Chronic interstitial lung disease         XR CHEST 1 VIEW    (Results Pending)           Assessment/Plan:    Active Hospital Problems    Diagnosis     COVID-19 [U07.1]      Priority: Medium    Secondary spontaneous pneumothorax [J93.12]      Priority: Medium    Pneumonia due to COVID-19 virus [U07.1, J12.82]      Priority: Medium    Primary spontaneous pneumothorax [J93.11]      Priority: Medium    Acute respiratory failure with hypoxia (HCC) [J96.01]     Acute on chronic respiratory failure with hypoxemia (HCC) [J96.21]             Acute on chronic respiratory failure  Hx of ILD on 3 L at home  Covid 19 infection   Right pneumothorax  -seen by pulmonology  -pt with hx of Pulmonary fibrosis  and Hx of cryptogenic organizing pneumonia with chronic resp failure with recurrent admissions  - imaging with right sided pneumothorax likely induced by cough   -> s.p chest tube placement   - doubt hypoxia due to covid , as it improved since chest tube is placed and now back on home level  - can wean steroids  - resumed Pirfenidone   -Continue Decadron 6 mg daily  -Was requiring O2 up to 5 L , oxygen saturation now stable at baseline O2 4 L  - 9/15 worsening pneumothorax ; chest tube not in position.  -> it was replaced with new chest tube ;   Pulmonology contacted cardiothoracic surgery for possible transfer to [facility]; recommendation was to place pneumostat   - pneumostat placed 9/17, repeat CXR with pneumotx and placed back on chest tube with suction     - CXR last 2 days no PTX - her chest tube was off suction yesterday and clamped this am.          Chronic diastolic CHF  last echo as above, from 5/6/22 EF 55%  -BNP mildly elevated  - pt was taken of diuretics recently for weight loss   - monitor for fluid overload and resume lasix as needed only        S.p recent L1 burst fracture   -  s/p T11-L3 PDFF with L1 corpectomy at  ( 8/22) DM type 2  -Has been taken off diuretics and diabetic meds for progressive weight loss   - hyperglycemia with steroids  -SSI  -continue to monitor BG       HTN  -BP stable  -on nadolol       HLD  -continue statin       Hypothyroidism  -continue synthroid       Iron Deficiency Anemia  - Recent Hemorrhoidal bleed -recent colonoscopy neg  - hb at 8 .4  -continue PO iron  -Hgb stable       Depression  Anxiety  -continue zoloft  -prn ativan         Diarrhea  -Imodium as needed  Cut down intake of tomato soup  Diarrhea resolved           DVT Prophylaxis: Lovenox  Diet: ADULT DIET;  Regular; 4 carb choices (60 gm/meal)  ADULT ORAL NUTRITION SUPPLEMENT; Breakfast, Dinner, HS Snack; Diabetic Oral Supplement  Code Status: Full Code  PT/OT Eval Status: ordered    [facility] [place] care        Ria De León MD

## 2022-09-23 ENCOUNTER — APPOINTMENT (OUTPATIENT)
Dept: GENERAL RADIOLOGY | Age: 67
DRG: 177 | End: 2022-09-23
Payer: MEDICARE

## 2022-09-23 VITALS
DIASTOLIC BLOOD PRESSURE: 69 MMHG | BODY MASS INDEX: 20.59 KG/M2 | HEIGHT: 63 IN | OXYGEN SATURATION: 93 % | WEIGHT: 116.2 LBS | SYSTOLIC BLOOD PRESSURE: 117 MMHG | RESPIRATION RATE: 18 BRPM | TEMPERATURE: 97.9 F | HEART RATE: 87 BPM

## 2022-09-23 LAB
ALBUMIN SERPL-MCNC: 2.6 G/DL (ref 3.4–5)
ANION GAP SERPL CALCULATED.3IONS-SCNC: 6 MMOL/L (ref 3–16)
BASOPHILS ABSOLUTE: 0.1 K/UL (ref 0–0.2)
BASOPHILS RELATIVE PERCENT: 0.6 %
BUN BLDV-MCNC: 8 MG/DL (ref 7–20)
CALCIUM SERPL-MCNC: 8.5 MG/DL (ref 8.3–10.6)
CHLORIDE BLD-SCNC: 101 MMOL/L (ref 99–110)
CO2: 30 MMOL/L (ref 21–32)
CREAT SERPL-MCNC: <0.5 MG/DL (ref 0.6–1.2)
EOSINOPHILS ABSOLUTE: 1.1 K/UL (ref 0–0.6)
EOSINOPHILS RELATIVE PERCENT: 12.3 %
GFR AFRICAN AMERICAN: >60
GFR NON-AFRICAN AMERICAN: >60
GLUCOSE BLD-MCNC: 106 MG/DL (ref 70–99)
GLUCOSE BLD-MCNC: 120 MG/DL (ref 70–99)
GLUCOSE BLD-MCNC: 87 MG/DL (ref 70–99)
GLUCOSE BLD-MCNC: 96 MG/DL (ref 70–99)
GLUCOSE BLD-MCNC: 97 MG/DL (ref 70–99)
HCT VFR BLD CALC: 25.8 % (ref 36–48)
HEMOGLOBIN: 8.5 G/DL (ref 12–16)
LYMPHOCYTES ABSOLUTE: 1.8 K/UL (ref 1–5.1)
LYMPHOCYTES RELATIVE PERCENT: 20.8 %
MCH RBC QN AUTO: 26.7 PG (ref 26–34)
MCHC RBC AUTO-ENTMCNC: 32.8 G/DL (ref 31–36)
MCV RBC AUTO: 81.4 FL (ref 80–100)
MONOCYTES ABSOLUTE: 0.5 K/UL (ref 0–1.3)
MONOCYTES RELATIVE PERCENT: 6.1 %
NEUTROPHILS ABSOLUTE: 5.3 K/UL (ref 1.7–7.7)
NEUTROPHILS RELATIVE PERCENT: 60.2 %
PDW BLD-RTO: 16.2 % (ref 12.4–15.4)
PERFORMED ON: ABNORMAL
PERFORMED ON: ABNORMAL
PERFORMED ON: NORMAL
PERFORMED ON: NORMAL
PHOSPHORUS: 4.7 MG/DL (ref 2.5–4.9)
PLATELET # BLD: 199 K/UL (ref 135–450)
PMV BLD AUTO: 6.9 FL (ref 5–10.5)
POTASSIUM SERPL-SCNC: 3.9 MMOL/L (ref 3.5–5.1)
RBC # BLD: 3.17 M/UL (ref 4–5.2)
SODIUM BLD-SCNC: 137 MMOL/L (ref 136–145)
WBC # BLD: 8.8 K/UL (ref 4–11)

## 2022-09-23 PROCEDURE — 71045 X-RAY EXAM CHEST 1 VIEW: CPT

## 2022-09-23 PROCEDURE — 80069 RENAL FUNCTION PANEL: CPT

## 2022-09-23 PROCEDURE — 99239 HOSP IP/OBS DSCHRG MGMT >30: CPT | Performed by: INTERNAL MEDICINE

## 2022-09-23 PROCEDURE — 99233 SBSQ HOSP IP/OBS HIGH 50: CPT | Performed by: INTERNAL MEDICINE

## 2022-09-23 PROCEDURE — 6370000000 HC RX 637 (ALT 250 FOR IP): Performed by: INTERNAL MEDICINE

## 2022-09-23 PROCEDURE — 36415 COLL VENOUS BLD VENIPUNCTURE: CPT

## 2022-09-23 PROCEDURE — 6360000002 HC RX W HCPCS: Performed by: INTERNAL MEDICINE

## 2022-09-23 PROCEDURE — 94761 N-INVAS EAR/PLS OXIMETRY MLT: CPT

## 2022-09-23 PROCEDURE — 6360000002 HC RX W HCPCS

## 2022-09-23 PROCEDURE — 85025 COMPLETE CBC W/AUTO DIFF WBC: CPT

## 2022-09-23 PROCEDURE — 94640 AIRWAY INHALATION TREATMENT: CPT

## 2022-09-23 PROCEDURE — 2580000003 HC RX 258: Performed by: INTERNAL MEDICINE

## 2022-09-23 PROCEDURE — 2700000000 HC OXYGEN THERAPY PER DAY

## 2022-09-23 RX ADMIN — ATORVASTATIN CALCIUM 40 MG: 40 TABLET, FILM COATED ORAL at 08:57

## 2022-09-23 RX ADMIN — PANTOPRAZOLE SODIUM 40 MG: 40 TABLET, DELAYED RELEASE ORAL at 05:00

## 2022-09-23 RX ADMIN — BENZONATATE 100 MG: 100 CAPSULE ORAL at 13:33

## 2022-09-23 RX ADMIN — ALBUTEROL SULFATE 2.5 MG: 2.5 SOLUTION RESPIRATORY (INHALATION) at 14:14

## 2022-09-23 RX ADMIN — ENOXAPARIN SODIUM 30 MG: 100 INJECTION SUBCUTANEOUS at 08:59

## 2022-09-23 RX ADMIN — MAGNESIUM GLUCONATE 500 MG ORAL TABLET 400 MG: 500 TABLET ORAL at 08:57

## 2022-09-23 RX ADMIN — MORPHINE SULFATE 4 MG: 4 INJECTION, SOLUTION INTRAMUSCULAR; INTRAVENOUS at 04:56

## 2022-09-23 RX ADMIN — PIRFENIDONE 3 TABLET: 267 TABLET, FILM COATED ORAL at 08:58

## 2022-09-23 RX ADMIN — PIRFENIDONE 3 TABLET: 267 TABLET, FILM COATED ORAL at 13:33

## 2022-09-23 RX ADMIN — SERTRALINE 200 MG: 100 TABLET, FILM COATED ORAL at 08:57

## 2022-09-23 RX ADMIN — SODIUM CHLORIDE, PRESERVATIVE FREE 10 ML: 5 INJECTION INTRAVENOUS at 08:59

## 2022-09-23 RX ADMIN — GUAIFENESIN 1200 MG: 600 TABLET, EXTENDED RELEASE ORAL at 08:57

## 2022-09-23 RX ADMIN — LEVOTHYROXINE SODIUM 125 MCG: 125 TABLET ORAL at 05:00

## 2022-09-23 RX ADMIN — BENZONATATE 100 MG: 100 CAPSULE ORAL at 08:57

## 2022-09-23 RX ADMIN — ALBUTEROL SULFATE 2.5 MG: 2.5 SOLUTION RESPIRATORY (INHALATION) at 07:49

## 2022-09-23 ASSESSMENT — PAIN - FUNCTIONAL ASSESSMENT: PAIN_FUNCTIONAL_ASSESSMENT: ACTIVITIES ARE NOT PREVENTED

## 2022-09-23 ASSESSMENT — PAIN DESCRIPTION - LOCATION: LOCATION: CHEST

## 2022-09-23 ASSESSMENT — PAIN SCALES - WONG BAKER
WONGBAKER_NUMERICALRESPONSE: 0

## 2022-09-23 ASSESSMENT — PAIN SCALES - GENERAL
PAINLEVEL_OUTOF10: 8
PAINLEVEL_OUTOF10: 0
PAINLEVEL_OUTOF10: 0

## 2022-09-23 ASSESSMENT — PAIN DESCRIPTION - ORIENTATION: ORIENTATION: MID;RIGHT

## 2022-09-23 ASSESSMENT — PAIN DESCRIPTION - DESCRIPTORS: DESCRIPTORS: TIGHTNESS

## 2022-09-23 NOTE — CARE COORDINATION
DISCHARGE ORDER  Date/Time 2022 2:38 PM  Completed by: Adele Minor RN, Case Management    Patient Name: Maximo Bonilla      : 1955  Admitting Diagnosis: Pneumonia due to COVID-19 virus [U07.1, J12.82]      Admit order Date and Status: IP 09/10/2022  (verify MD's last order for status of admission)      Noted discharge order. If applicable PT/OT recommendation at Discharge:   Discharge Recommendations: Home 24 hr assist and with home PT   DME needs for discharge: Needs Met (pt has BSC and RW)    Confirmed discharge plan with patient  Discharge Plan: Chart reviewed. Met with pt  at bedside and explained the role of the CM. Plans to return home today in care of family. They will provide 24/7 assist. Resumption of care orders in [facility] for [facility]. (SN/PT/OT)  Per [facility] liaison. Orders CAROLANN and AVS will be pulled from [facility]. Family will provide transportation home and will bring her portable O2 tank for transport. Date of Last IMM Given: 2022    Has Home O2 in place on admit:  Yes - [facility] home O2  Informed of need to bring portable home O2 tank on day of discharge for nursing to connect prior to leaving:   Yes  Verbalized agreement/Understanding:   Yes  Pt is being d/c'd to home today. Pt's O2 sats are 93% on 3 liters (baseline). Discharge timeout done with [facility]. All discharge needs and concerns addressed.

## 2022-09-23 NOTE — PROGRESS NOTES
Comprehensive Nutrition Assessment    Type and Reason for Visit:  Reassess    Nutrition Recommendations/Plan:   Continue ADULT DIET; Regular; 4 carb choices. Continue Glucerna ONS with Breakfast, Dinner, HS Snack. Monitor appetite, meal intake, and intake of ONS. Monitor nutrition related labs, bowel function, and weight trends. Malnutrition Assessment:  Malnutrition Status:  Severe malnutrition (09/23/22 1315)    Context:  Acute Illness     Findings of the 6 clinical characteristics of malnutrition:  Energy Intake:  50% or less of estimated energy requirements for 5 or more days  Weight Loss:  Greater than 7.5% over 3 months     Body Fat Loss: Moderate body fat loss Orbital, Triceps, Buccal region   Muscle Mass Loss: Moderate muscle mass loss Temples (temporalis), Clavicles (pectoralis & deltoids), Hand (interosseous)  Fluid Accumulation:  No significant fluid accumulation     Strength:  Not Performed    Nutrition Assessment:    Patient is improved from a nutritional standpoint AEB meal intake has improved + patient is consuming ONS well; However, patient remains at risk for further compromise d/t ongoing respiratory dysfunction and severe muscle wasting + fat loss noted on NFPE today; Will continue ADULT DIET; Regular; 4 carb choices + continue Glucerna ONS with Breakfast, Dinner; HS Snack    Nutrition Related Findings:    Patient is A+Ox4; + own teeth and no difficulties chewing and/or swallowing; Patient endorsed increased appetite since last RD visit; Patient consumed % x 1 meal for breakfast (9/13/22); +BM 9/23/22; Patient developed stage 1 pressure wound on right inner buttock; Patient had chest tube d/c'd today;  Patient reported that she was going home today Wound Type: Stage I, Pressure Injury, Surgical Incision (stage 1 pressure wound on right inner buttock; surgical incision on back (back surgery))       Current Nutrition Intake & Therapies:    Average Meal Intake: 51-75%, %  Average Supplements Intake: %  ADULT DIET; Regular; 4 carb choices (60 gm/meal)  ADULT ORAL NUTRITION SUPPLEMENT; Breakfast, Dinner, HS Snack; Diabetic Oral Supplement    Anthropometric Measures:  Height: 5' 3\" (160 cm)  Ideal Body Weight (IBW): 115 lbs (52 kg)    Admission Body Weight: 126 lb 12.2 oz (57.5 kg) (obtained on 9/10/22; Actual weight)  Current Body Weight: 116 lb 3.2 oz (52.7 kg) (obtained 9/23/22; Standing scale, Actual), 101 % IBW. Weight Source: Standing Scale  Current BMI (kg/m2): 20.6  Usual Body Weight: 134 lb 14 oz (61.2 kg) (obtained 7/17/22;  Actual weight)  % Weight Change (Calculated): -13.8  Weight Adjustment For: No Adjustment                 BMI Categories: Underweight (BMI less than 22) age over 72    Estimated Daily Nutrient Needs:  Energy Requirements Based On: Kcal/kg  Weight Used for Energy Requirements: Current  Energy (kcal/day): 8003-8788 kcals based on 28-30kcals/kg/CBW  Weight Used for Protein Requirements: Current  Protein (g/day): 69-80 g protein based o 1.3-1.5 g/kg/CBW  Method Used for Fluid Requirements: 1 ml/kcal  Fluid (ml/day): 2908-4136 ml    Nutrition Diagnosis:   Severe malnutrition related to impaired respiratory function, inadequate protein-energy intake, increase demand for energy/nutrients as evidenced by poor intake prior to admission, wounds, weight loss, moderate muscle loss, moderate loss of subcutaneous fat    Nutrition Interventions:   Food and/or Nutrient Delivery: Continue Current Diet, Continue Oral Nutrition Supplement  Nutrition Education/Counseling: No recommendation at this time  Coordination of Nutrition Care: Continue to monitor while inpatient, Coordination of Care  Plan of Care discussed with: patient    Goals:  Previous Goal Met: Progressing toward Goal(s)  Goals: PO intake 75% or greater, by next RD assessment       Nutrition Monitoring and Evaluation:   Behavioral-Environmental Outcomes: None Identified  Food/Nutrient Intake Outcomes: Food and Nutrient Intake, Supplement Intake  Physical Signs/Symptoms Outcomes: Biochemical Data, Meal Time Behavior, Nutrition Focused Physical Findings, Weight, Skin    Discharge Planning:    Continue current diet, Continue Oral Nutrition Supplement     [address]: [id number]

## 2022-09-23 NOTE — PROGRESS NOTES
Pt awake in bed. Assessment completed and medications given per MAR. VS as charted in flowsheet. A&Ox4. Chest tube in place and clamped. Dressing clean, dry and intact. Pt denies any further needs at this time. Call light in reach and bed in lowest position.

## 2022-09-23 NOTE — FLOWSHEET NOTE
09/23/22 0207   Vital Signs   Temp 97.7 °F (36.5 °C)   Temp Source Axillary   Heart Rate 82   Heart Rate Source Monitor   Resp 18   /74   BP Location Right upper arm   BP Method Automatic   MAP (Calculated) 87   Patient Position Semi fowlers   Level of Consciousness 0   MEWS Score 1   Oxygen Therapy   SpO2 96 %   O2 Device Nasal cannula   O2 Flow Rate (L/min) 3 L/min     Pt awake in bed. VS as shown above. Pt denies any further needs at this time. Call light in reach and bed in lowest position.

## 2022-09-23 NOTE — PROGRESS NOTES
RT Inhaler-Nebulizer Bronchodilator Protocol Note    There is a bronchodilator order in the chart from a provider indicating to follow the RT Bronchodilator Protocol and there is an Initiate RT Inhaler-Nebulizer Bronchodilator Protocol order as well (see protocol at bottom of note). CXR Findings:  No results found. The findings from the last RT Protocol Assessment were as follows:   History Pulmonary Disease: Chronic pulmonary disease  Respiratory Pattern: Dyspnea on exertion or RR 21-25 bpm  Breath Sounds: Slightly diminished and/or crackles  Cough: Strong, spontaneous, non-productive  Indication for Bronchodilator Therapy: Decreased or absent breath sounds  Bronchodilator Assessment Score: 6    Aerosolized bronchodilator medication orders have been revised according to the RT Inhaler-Nebulizer Bronchodilator Protocol below. Respiratory Therapist to perform RT Therapy Protocol Assessment initially then follow the protocol. Repeat RT Therapy Protocol Assessment PRN for score 0-3 or on second treatment, BID, and PRN for scores above 3. No Indications - adjust the frequency to every 6 hours PRN wheezing or bronchospasm, if no treatments needed after 48 hours then discontinue using Per Protocol order mode. If indication present, adjust the RT bronchodilator orders based on the Bronchodilator Assessment Score as indicated below. Use Inhaler orders unless patient has one or more of the following: on home nebulizer, not able to hold breath for 10 seconds, is not alert and oriented, cannot activate and use MDI correctly, or respiratory rate 25 breaths per minute or more, then use the equivalent nebulizer order(s) with same Frequency and PRN reasons based on the score. If a patient is on this medication at home then do not decrease Frequency below that used at home.     0-3 - enter or revise RT bronchodilator order(s) to equivalent RT Bronchodilator order with Frequency of every 4 hours PRN for wheezing or increased work of breathing using Per Protocol order mode. 4-6 - enter or revise RT Bronchodilator order(s) to two equivalent RT bronchodilator orders with one order with BID Frequency and one order with Frequency of every 4 hours PRN wheezing or increased work of breathing using Per Protocol order mode. 7-10 - enter or revise RT Bronchodilator order(s) to two equivalent RT bronchodilator orders with one order with TID Frequency and one order with Frequency of every 4 hours PRN wheezing or increased work of breathing using Per Protocol order mode. 11-13 - enter or revise RT Bronchodilator order(s) to one equivalent RT bronchodilator order with QID Frequency and an Albuterol order with Frequency of every 4 hours PRN wheezing or increased work of breathing using Per Protocol order mode. Greater than 13 - enter or revise RT Bronchodilator order(s) to one equivalent RT bronchodilator order with every 4 hours Frequency and an Albuterol order with Frequency of every 2 hours PRN wheezing or increased work of breathing using Per Protocol order mode.      Electronically signed by Klarissa Vazquez RCP on 9/22/2022 at 8:35 PM

## 2022-09-23 NOTE — FLOWSHEET NOTE
09/23/22 1330   Vital Signs   Temp 97.9 °F (36.6 °C)   Temp Source Oral   Heart Rate 92   Heart Rate Source Monitor   Resp 18   /69   BP Location Left upper arm   MAP (Calculated) 85   Patient Position Sitting   Level of Consciousness 0   MEWS Score 1   Pain Assessment   Pain Assessment 0-10   Pain Level 0   Oxygen Therapy   SpO2 97 %   O2 Device Nasal cannula   Skin Assessment Clean, dry, & intact   O2 Flow Rate (L/min) 3 L/min       Afternoon vitals completed. Meds given per MAR. Pt stable.     Tai Aguilar RN

## 2022-09-23 NOTE — PROGRESS NOTES
RT Inhaler-Nebulizer Bronchodilator Protocol Note    There is a bronchodilator order in the chart from a provider indicating to follow the RT Bronchodilator Protocol and there is an Initiate RT Inhaler-Nebulizer Bronchodilator Protocol order as well (see protocol at bottom of note). CXR Findings:  No results found. The findings from the last RT Protocol Assessment were as follows:   History Pulmonary Disease: (P) Chronic pulmonary disease  Respiratory Pattern: (P) Dyspnea on exertion or RR 21-25 bpm  Breath Sounds: (P) Slightly diminished and/or crackles  Cough: (P) Strong, spontaneous, non-productive  Indication for Bronchodilator Therapy: (P) Decreased or absent breath sounds  Bronchodilator Assessment Score: (P) 6    Aerosolized bronchodilator medication orders have been revised according to the RT Inhaler-Nebulizer Bronchodilator Protocol below. Respiratory Therapist to perform RT Therapy Protocol Assessment initially then follow the protocol. Repeat RT Therapy Protocol Assessment PRN for score 0-3 or on second treatment, BID, and PRN for scores above 3. No Indications - adjust the frequency to every 6 hours PRN wheezing or bronchospasm, if no treatments needed after 48 hours then discontinue using Per Protocol order mode. If indication present, adjust the RT bronchodilator orders based on the Bronchodilator Assessment Score as indicated below. Use Inhaler orders unless patient has one or more of the following: on home nebulizer, not able to hold breath for 10 seconds, is not alert and oriented, cannot activate and use MDI correctly, or respiratory rate 25 breaths per minute or more, then use the equivalent nebulizer order(s) with same Frequency and PRN reasons based on the score. If a patient is on this medication at home then do not decrease Frequency below that used at home.     0-3 - enter or revise RT bronchodilator order(s) to equivalent RT Bronchodilator order with Frequency of every 4 hours PRN for wheezing or increased work of breathing using Per Protocol order mode. 4-6 - enter or revise RT Bronchodilator order(s) to two equivalent RT bronchodilator orders with one order with BID Frequency and one order with Frequency of every 4 hours PRN wheezing or increased work of breathing using Per Protocol order mode. 7-10 - enter or revise RT Bronchodilator order(s) to two equivalent RT bronchodilator orders with one order with TID Frequency and one order with Frequency of every 4 hours PRN wheezing or increased work of breathing using Per Protocol order mode. 11-13 - enter or revise RT Bronchodilator order(s) to one equivalent RT bronchodilator order with QID Frequency and an Albuterol order with Frequency of every 4 hours PRN wheezing or increased work of breathing using Per Protocol order mode. Greater than 13 - enter or revise RT Bronchodilator order(s) to one equivalent RT bronchodilator order with every 4 hours Frequency and an Albuterol order with Frequency of every 2 hours PRN wheezing or increased work of breathing using Per Protocol order mode. RT to enter RT Home Evaluation for COPD & MDI Assessment order using Per Protocol order mode.     Electronically signed by Delores Bower RCP on 9/23/2022 at 7:53 AM

## 2022-09-23 NOTE — DISCHARGE INSTR - COC
Continuity of Care Form    Patient Name: Laurie Herzog   :  1955  MRN:  [id number]    Admit date:  2022  Discharge date:  2022    Code Status Order: Full Code   Advance Directives:     Admitting Physician:  Obed Segovia MD  PCP: Sumit Martinez MD    Discharging Nurse: Katelynn Mijares [facility] Unit/Room#: 1306 [facility] Unit Phone Number: [phone]      Emergency Contact:   Extended Emergency Contact Information  Primary Emergency Contact: [facility]  Address: [address] [place] [address] Phone: [phone]  Mobile Phone: [phone]  Relation: Spouse  Secondary Emergency Contact: [facility]  Address: [address] Phone: [phone]  Mobile Phone: [phone]  Relation: Child    Past Surgical History:  Past Surgical History:   Procedure Laterality Date    ARM SURGERY Left 3/2/2020    LEFT ULNAR NERVE DECOMPRESSION AT THE ELBOW performed by Esperanza Wayne MD at [address] [place] [address] 2019    EBUS WF W/ANES.  performed by Cole Duenas MD at [facility]  2019    BRONCHOSCOPY/TRANSBRONCHIAL NEEDLE BIOPSY performed by Cole Duenas MD at [facility]  2019    BRONCHOSCOPY/TRANSBRONCHIAL LUNG BIOPSY performed by Cole Duenas MD at [facility]  2019    BRONCHOSCOPY ALVEOLAR LAVAGE performed by Cole Duenas MD at [facility]  07/10/2019    BRONCHOSCOPY N/A 7/10/2019    BRONCHOSCOPY ALVEOLAR LAVAGE performed by Blas Saldana MD at [facility] Bilateral 2019    BRONCHOSCOPY N/A 2019    BRONCHOSCOPY ALVEOLAR LAVAGE performed by Salbador Mcdonough MD at [facility] N/A 2019    BRONCHOSCOPY ALVEOLAR LAVAGE performed by Rod Esteves MD at [address] [place] [address] COLONOSCOPY N/A 8/25/2022    COLONOSCOPY POLYPECTOMY SNARE/COLD BIOPSY performed by Grady Lieberman MD at [address]  9/15/2022    CT GUIDED CHEST TUBE 9/15/2022 [address] SCAN    HYSTERECTOMY, TOTAL ABDOMINAL (CERVIX REMOVED)      partial       Immunization History:   Immunization History   Administered Date(s) Administered    COVID-19, MODERNA BLUE border, Primary or Immunocompromised, (age 12y+), IM, 100 mcg/0.5mL 03/25/2021, 04/22/2021    Influenza Vaccine, unspecified formulation 02/13/2016    Influenza Virus Vaccine 01/15/2016, 02/13/2016    Influenza, FLUAD, (age 72 y+), Adjuvanted, 0.5mL 11/23/2020, 11/17/2021    Influenza, Triv, inactivated, subunit, adjuvanted, IM (Fluad 65 yrs and older) 11/14/2019    Pneumococcal Conjugate 13-valent ([id number]) 06/18/2019    Pneumococcal Polysaccharide ([id number]) 11/14/2013, 04/03/2014    Tdap (Boostrix, Adacel) 02/04/2021       Active Problems:  Patient Active Problem List   Diagnosis Code    Chest pain R07.9    HTN (hypertension) I10    Hyperlipidemia with target LDL less than 130 E78.5    Hypokalemia E87.6    Insomnia G47.00    Trochanteric bursitis of both hips M70.61, M70.62    Migraine G43.909    Syncope R55    Acute blood loss anemia D62    Fatigue R53.83    Hypothyroidism E03.9    Mild single current episode of major depressive disorder ([facility]) F32.0    Anxiety F41.9    Pneumonia of both lower lobes due to infectious organism J18.9    A-fib ([facility]) I48.91    Atypical pneumonia J18.9    Acute bronchitis with bronchospasm J20.9    Acute on chronic diastolic CHF (congestive heart failure) ([facility]) I50.33    Class 2 obesity with body mass index (BMI) of 39.0 to 39.9 in adult E66.9, Z68.39    Abnormal chest CT S14.73    Acute diastolic heart failure ([facility]) I50.31    ILD (interstitial lung disease) ([facility]) J84.9    Acute on chronic respiratory failure with hypoxia ([facility]) J96.21    Restrictive lung disease J98.4    Abnormal CT of the chest R93.89 SOB (shortness of breath) R06.02    Acute cystitis without hematuria N30.00    Shortness of breath R06.02    Acute on chronic respiratory failure with hypoxemia ([facility]) J96.21    Cryptogenic organizing pneumonia ([facility]) J84.116    Pneumothorax of left lung after biopsy J95.811    COPD (chronic obstructive pulmonary disease) ([facility]) J44.9    Type 2 diabetes mellitus without complication ([facility]) R93.2    Hyponatremia E87.1    Numbness and tingling in left hand R20.0, R20.2    Ulnar neuropathy at elbow of left upper extremity G56.22    Acute congestive heart failure ([facility]) I50.9    Left wrist pain M25.532    Left wrist tendinitis M77.8    GERD (gastroesophageal reflux disease) H20.7    Toxic metabolic encephalopathy L39.7    VIRGINIE (acute kidney injury) ([facility] N17.9    Lumbar radiculopathy M54.16    Acute respiratory failure with hypoxia ([facility]) J96.01    Rib pain on left side R07.81    Elevated brain natriuretic peptide (BNP) level R79.89    Fracture of multiple ribs of left side S22.42XA    Interstitial lung disease ([facility]) J84.9    Hypomagnesemia E83.42    Depression F32. A    Chronic diastolic congestive heart failure ([facility]) I50.32    Burst fracture of lumbar vertebra ([facility] S32.001A    H/O Spinal surgery Z98.890    Severe malnutrition ([facility] E43    Pneumonia due to COVID-19 virus U07.1, J12.82    Primary spontaneous pneumothorax J93.11    Secondary spontaneous pneumothorax J93.12    COVID-19 U07.1       Isolation/Infection:   Isolation            No Isolation          Patient Infection Status       Infection Onset Added Last Indicated Last Indicated By Review Planned Expiration Resolved Resolved By    None active    Resolved    COVID-19 09/09/22 09/09/22 09/09/22 COVID-19 & Influenza Combo   09/19/22 Мария Kaur RN    Pt is asymptomatic and 5 days or greater since positive testing, per Dr and PCU staff,     COVID-19 (Rule Out) 09/09/22 09/09/22 09/09/22 COVID-19 & Influenza Combo (Ordered)   09/09/22 Rule-Out Test Resulted COVID-19 (Rule Out) 07/14/22 07/14/22 07/14/22 COVID-19, Rapid (Ordered)   07/14/22 Rule-Out Test Resulted    COVID-19 (Rule Out) 07/04/22 07/04/22 07/04/22 COVID-19, Rapid (Ordered)   07/04/22 Rule-Out Test Resulted    COVID-19 (Rule Out) 05/06/22 05/06/22 05/06/22 COVID-19, Rapid (Ordered)   05/06/22 Rule-Out Test Resulted    COVID-19 (Rule Out) 04/18/22 04/18/22 04/18/22 COVID-19 & Influenza Combo (Ordered)   04/18/22 Rule-Out Test Resulted    COVID-19 (Rule Out) 01/25/22 01/25/22 01/25/22 COVID-19 & Influenza Combo (Ordered)   01/25/22 Rule-Out Test Resulted    COVID-19 (Rule Out) 01/24/22 01/24/22 01/24/22 COVID-19, Rapid (Ordered)   01/24/22 Rule-Out Test Resulted    COVID-19 (Rule Out) 06/18/21 06/18/21 06/18/21 COVID-19 & Influenza Combo (Ordered)   06/18/21 Rule-Out Test Resulted            Nurse Assessment:  Last Vital Signs: /69   Pulse 76   Temp 98.1 °F (36.7 °C) (Oral)   Resp 17   Ht 5' 3\" (1.6 m)   Wt 116 lb 3.2 oz (52.7 kg)   SpO2 97%   BMI 20.58 kg/m²     Last documented pain score (0-10 scale): Pain Level: 0  Last Weight:   Wt Readings from Last 1 Encounters:   09/23/22 116 lb 3.2 oz (52.7 kg)     Mental Status:  oriented and alert    IV Access:  - None    Nursing Mobility/ADLs:  Walking   Assisted  Transfer  Assisted  Bathing  Assisted  Dressing  Assisted  Toileting  Assisted  Feeding  Independent  Med Admin  Assisted  Med Delivery   whole    Wound Care Documentation and Therapy:  Wound 08/16/22 Buttocks Right; Inner intact pink (Active)   Wound Etiology Pressure Stage 1 09/23/22 0906   Dressing Status Clean;Dry; Intact 09/23/22 0906   Wound Cleansed Not Cleansed 09/23/22 0906   Dressing/Treatment Foam 09/23/22 0906   Wound Assessment Dry;Pink/red 09/23/22 0906   Drainage Amount None 09/23/22 0906   Odor None 09/23/22 0906   Nelia-wound Assessment Blanchable erythema 09/23/22 0906   Margins Attached edges 09/23/22 0906   Number of days: 37       Wound 08/16/22 Back Medial 45 staples, 2 sutures to upper left and right back (Active)   Wound Etiology Surgical 09/22/22 1945   Dressing Status Other (Comment) 09/12/22 0400   Wound Cleansed Not Cleansed 09/15/22 0914   Dressing/Treatment Open to air 09/22/22 1945   Wound Assessment Dry 09/15/22 0914   Drainage Amount None 09/15/22 0914   Odor None 09/15/22 0914   Nelia-wound Assessment Intact 09/15/22 0914   Margins Attached edges 09/14/22 0903   Number of days: 37        Elimination:  Continence: Bowel: Yes  Bladder: Yes  Urinary Catheter: None   Colostomy/Ileostomy/Ileal Conduit: No       Date of Last BM: 8/21      Intake/Output Summary (Last 24 hours) at 9/23/2022 1311  Last data filed at 9/23/2022 1045  Gross per 24 hour   Intake 900 ml   Output 800 ml   Net 100 ml     I/O last 3 completed shifts: In: 5 [P.O.:540]  Out: 1250 [Urine:1250]    Safety Concerns: At Risk for Falls    Impairments/Disabilities:      None    Nutrition Therapy:  Current Nutrition Therapy:   - Oral Diet:  Carb Control 4 carbs/meal (1800kcals/day)    Routes of Feeding: Oral  Liquids: Thin Liquids  Daily Fluid Restriction: no  Last Modified Barium Swallow with Video (Video Swallowing Test): not done    Treatments at the Time of Hospital Discharge:   Respiratory Treatments: 3L baseline  Oxygen Therapy:  is on oxygen at 3 L/min per nasal cannula.   Ventilator:    - No ventilator support    Rehab Therapies: Physical Therapy and Occupational Therapy  Weight Bearing Status/Restrictions: No weight bearing restrictions  Other Medical Equipment (for information only, NOT a DME order):  walker  Other Treatments:     Patient's personal belongings (please select all that are sent with patient):  None    RN SIGNATURE:  Electronically signed by Surinder Licea RN on 9/23/22 at 3:01 PM EDT    CASE MANAGEMENT/SOCIAL WORK SECTION    Inpatient Status Date: 09/10/2022    Readmission Risk Assessment Score:  Readmission Risk              Risk of Unplanned Readmission:  50 Discharging to Facility/ Agency   Name: [facility]  Address:  Phone: [phone]  Fax:    Dialysis Facility (if applicable)   Name:  Address:  Dialysis Schedule:  Phone:  Fax:    / signature: Electronically signed by Juliana Ludwig RN on 9/23/22 at 2:34 PM EDT    PHYSICIAN SECTION    Prognosis: Fair    Condition at Discharge: Stable    Rehab Potential (if transferring to Rehab): Fair    Recommended Labs or Other Treatments After Discharge:     Home PTOT and ortho brace as ordered. Physician Certification: I certify the above information and transfer of Omid Shaffer  is necessary for the continuing treatment of the diagnosis listed and that she requires [address] for less 30 days.      Update Admission H&P: No change in H&P    PHYSICIAN SIGNATURE:  Electronically signed by Johanna Corado MD on 9/23/22 at 1:12 PM EDT

## 2022-09-26 ENCOUNTER — CARE COORDINATION (OUTPATIENT)
Dept: CASE MANAGEMENT | Age: 67
End: 2022-09-26

## 2022-09-26 DIAGNOSIS — I50.32 CHRONIC DIASTOLIC CONGESTIVE HEART FAILURE (HCC): Primary | ICD-10-CM

## 2022-09-26 PROCEDURE — 1111F DSCHRG MED/CURRENT MED MERGE: CPT | Performed by: FAMILY MEDICINE

## 2022-09-26 NOTE — CARE COORDINATION
[facility] Transitions Initial Follow Up Call    Call within 2 business days of discharge: Yes    Patient: Zachary Pryor Patient : 1955   MRN: [id number]  Reason for Admission: acute on chronic resp failure, hx of ILD on 3lpm O2 at home, COVID-19 infection, right pneumothorax, hx pulm fibrosis and cryptogenic organizing pna with chronic resp failure, chronic dCHF, s/p recent L1 burst fx (s/p T11-L3 PDFF with L1 corpectomy at [facility] ), DM2, HTN, HLD, hypothyroidism, iron deficiency anemia, depression, anxiety, diarrhea (Imodium prn, decrease tomato soup intake) -> home with [facility]  Discharge Date: 22 RARS: Readmission Risk Score: 33.3      Last Discharge  [place] [id number]       Date Complaint Diagnosis Description Type Department Provider    22 Shortness of Breath Primary spontaneous pneumothorax . .. ED to Hosp-Admission (Discharged) (ADMITTED) [facility] PCU Paz Hathaway MD; Lázaro Hare... Spoke with: Kev Odell (patient)    Facility: [place]    Non-face-to-face services provided:  Obtained and reviewed discharge summary and/or continuity of care documents  Communication with home health agencies or other community services the patient is currently using-see note  Education of patient/family/caregiver/guardian to support self-management-see note  Assessment and support for treatment adherence and medication management-1111F completed    Was this an external facility discharge? No Discharge Facility: NA    Challenges to be reviewed by the provider   Additional needs identified to be addressed with provider: No  Furosemide stopped at discharge. KCl was not. States she does not have a cardiologist. Will need hospital follow up visit. Method of communication with provider : chart routing    Advance Care Planning:   Does patient have an Advance Directive: decision maker updated.      Care Transition Nurse contacted the patient by telephone to perform post hospital discharge assessment. Provided introduction to self, and explanation of the CTN role. CTN reviewed discharge instructions, medical action plan and red flags with patient who verbalized understanding. Patient given an opportunity to ask questions and does not have any further questions or concerns at this time. Were discharge instructions available to patient? Yes. Reviewed appropriate site of care based on symptoms and resources available to patient including: PCP  Specialist  Home health. The patient agrees to contact the PCP office for questions related to their healthcare. Medication reconciliation was performed with patient, who verbalizes understanding of administration of home medications. Was patient discharged with a pulse oximeter? Has one    Reports feeling improved. Some SOB noted in conversation which she states is her baseline. She ate vegetable soup with fruit cup, crackers, Ensure for lunch. Reviewed low sodium diet and fluid restrictions. She voices understanding. States she reads labels (reviewed 2000gm sodium and less than 64 ounce/day fluids). Denies LE edema. Breathing is good. States her O2 dropped to 66% at 3.5lpm this morning but rebounded to 96% after about 3 minutes of pursed lip breathing. Nebulizer works well and she uses 3x daily and prn for 4th time. Also has her rescue inhaler. BS 99 this morning. Her furosemide was stopped, but potassium continued. Reviewed K lab. She will contact PCP for hospital f/up. CTN will send message to provider as JOSE. States she contacted HC this morning to see about setting up resumption. States OT wants to come tomorrow but she prefers to see SN first. CTN will contact [facility]. agency. CTN provided contact information. Contacted Luzma at [facility] . Confirms SN to resume tomorrow. They will contact patient. Plan for follow-up call in 3-5 days based on severity of symptoms and risk factors.     Plan for next call: symptom

## 2022-09-28 ENCOUNTER — CARE COORDINATION (OUTPATIENT)
Dept: CASE MANAGEMENT | Age: 67
End: 2022-09-28

## 2022-09-28 NOTE — CARE COORDINATION
[facility] Care Transitions Follow Up Call    Patient: Julia Martinez  Patient : 1955   MRN: [id number]  Reason for Admission: acute on chronic resp failure, hx of ILD on 3lpm O2 at home, COVID-19 infection, right pneumothorax, hx pulm fibrosis and cryptogenic organizing pna with chronic resp failure, chronic dCHF, s/p recent L1 burst fx (s/p T11-L3 PDFF with L1 corpectomy at [facility] ), DM2, HTN, HLD, hypothyroidism, iron deficiency anemia, depression, anxiety, diarrhea (Imodium prn, decrease tomato soup intake) -> home with [facility]  Discharge Date: 22 RARS: Readmission Risk Score: 33.3    0830-CTN attempted to contact patient to notify her that PCP recommends to stop potassium since her furosemide was stopped at discharge. Unable to reach patient at this time. Message left asking her to return call today. CTN will attempt to reach patient again if she does not return call.      Follow Up  Future Appointments   Date Time Provider Calvin Mcmullen   2023 10:00 AM [facility] PULMONARY FUNCTION TESTING [facility] PFT [facility]   2023 11:15 AM MD DURAN Veliz PULTJ Cabral Friday, RN  Care Transition Nurse  [phone] mobile

## 2022-09-29 ENCOUNTER — CARE COORDINATION (OUTPATIENT)
Dept: CASE MANAGEMENT | Age: 67
End: 2022-09-29

## 2022-09-29 NOTE — CARE COORDINATION
[facility] Care Transitions Follow Up Call      Patient: Keith Mejia  Patient : 1955   MRN: [id number]  Reason for Admission: acute on chronic resp failure, hx of ILD on 3lpm O2 at home, COVID-19 infection, right pneumothorax, hx pulm fibrosis and cryptogenic organizing pna with chronic resp failure, chronic dCHF, s/p recent L1 burst fx (s/p T11-L3 PDFF with L1 corpectomy at [facility] ), DM2, HTN, HLD, hypothyroidism, iron deficiency anemia, depression, anxiety, diarrhea (Imodium prn, decrease tomato soup intake) -> home with [facility]  Discharge Date: 22 RARS: Readmission Risk Score: 33.3      Needs to be reviewed by the provider   Additional needs identified to be addressed with provider: Yes  medications-I instructed patient to stop her potassium also per your note. She is active with [facility] if you desire any labwork. She also needs to schedule a hospital follow up visit still. Overall she reports feeling better and sounds better in conversation. Method of communication with provider: chart routing. Care Transition Nurse contacted the patient by telephone to follow up after recent admission. Feels better. Up and walking around, able to clean her house today, fixed her own lunch. Instructed her to hold her potassium as instructed by PCP. She voices understanding. Home care resumed. She feels well and denies. Addressed changes since last contact:  medications-stop KCl per PCP since Lasix stopped at DC  Discussed follow-up appointments. If no appointment was previously scheduled, appointment scheduling offered: Yes. Is follow up appointment scheduled within 7 days of discharge? She will call PCP to schedule.     Follow Up  Future Appointments   Date Time Provider Calvin Mcmullen   2023 10:00 AM [facility] FUNCTION [facility] Ondina NOGUERA   2023 11:15 AM MD DURAN Byrnes PULM [facility]     Non-[facility] follow up appointment(s): JENNIFER    Care Transition Nurse reviewed medical action plan with patient and discussed any barriers to care and/or understanding of plan of care after discharge. Discussed appropriate site of care based on symptoms and resources available to patient including: PCP  Specialist  Home health. The patient agrees to contact the PCP office for questions related to their healthcare. Patients top risk factors for readmission: functional physical ability, medical condition-see above, medication management, polypharmacy, and utilization of services  Interventions to address risk factors: Education of patient/family/caregiver/guardian to support self-management-education, review of resources, needs for hosp f/up appt    Offered patient enrollment in the Remote Patient Monitoring (RPM) program for in-home monitoring: Patient is not eligible for RPM program.     Care Transition Nurse provided contact information for future needs. Plan for follow-up call in 5-7 days based on severity of symptoms and risk factors.   Plan for next call: symptom management-breathing    Adelaida Hsieh RN  Care Transition Nurse  [phone] mobile

## 2022-10-03 ENCOUNTER — TELEMEDICINE (OUTPATIENT)
Dept: FAMILY MEDICINE CLINIC | Age: 67
End: 2022-10-03
Payer: MEDICARE

## 2022-10-03 DIAGNOSIS — Z09 HOSPITAL DISCHARGE FOLLOW-UP: ICD-10-CM

## 2022-10-03 DIAGNOSIS — I10 PRIMARY HYPERTENSION: ICD-10-CM

## 2022-10-03 DIAGNOSIS — E03.9 ACQUIRED HYPOTHYROIDISM: ICD-10-CM

## 2022-10-03 DIAGNOSIS — E11.9 TYPE 2 DIABETES MELLITUS WITHOUT COMPLICATION, WITHOUT LONG-TERM CURRENT USE OF INSULIN (HCC): ICD-10-CM

## 2022-10-03 DIAGNOSIS — J96.21 ACUTE ON CHRONIC RESPIRATORY FAILURE WITH HYPOXIA (HCC): ICD-10-CM

## 2022-10-03 DIAGNOSIS — S32.001S CLOSED BURST FRACTURE OF LUMBAR VERTEBRA, SEQUELA: ICD-10-CM

## 2022-10-03 DIAGNOSIS — U07.1 COVID-19: ICD-10-CM

## 2022-10-03 DIAGNOSIS — E78.5 HYPERLIPIDEMIA WITH TARGET LDL LESS THAN 130: ICD-10-CM

## 2022-10-03 DIAGNOSIS — K21.9 GASTROESOPHAGEAL REFLUX DISEASE WITHOUT ESOPHAGITIS: Primary | ICD-10-CM

## 2022-10-03 DIAGNOSIS — I50.32 CHRONIC DIASTOLIC CONGESTIVE HEART FAILURE (HCC): ICD-10-CM

## 2022-10-03 DIAGNOSIS — J84.9 INTERSTITIAL LUNG DISEASE (HCC): ICD-10-CM

## 2022-10-03 DIAGNOSIS — J93.11 PRIMARY SPONTANEOUS PNEUMOTHORAX: ICD-10-CM

## 2022-10-03 PROCEDURE — G8400 PT W/DXA NO RESULTS DOC: HCPCS | Performed by: FAMILY MEDICINE

## 2022-10-03 PROCEDURE — 1090F PRES/ABSN URINE INCON ASSESS: CPT | Performed by: FAMILY MEDICINE

## 2022-10-03 PROCEDURE — 1123F ACP DISCUSS/DSCN MKR DOCD: CPT | Performed by: FAMILY MEDICINE

## 2022-10-03 PROCEDURE — G8428 CUR MEDS NOT DOCUMENT: HCPCS | Performed by: FAMILY MEDICINE

## 2022-10-03 PROCEDURE — 3044F HG A1C LEVEL LT 7.0%: CPT | Performed by: FAMILY MEDICINE

## 2022-10-03 PROCEDURE — 99215 OFFICE O/P EST HI 40 MIN: CPT | Performed by: FAMILY MEDICINE

## 2022-10-03 PROCEDURE — 1111F DSCHRG MED/CURRENT MED MERGE: CPT | Performed by: FAMILY MEDICINE

## 2022-10-03 PROCEDURE — 3017F COLORECTAL CA SCREEN DOC REV: CPT | Performed by: FAMILY MEDICINE

## 2022-10-03 PROCEDURE — 2022F DILAT RTA XM EVC RTNOPTHY: CPT | Performed by: FAMILY MEDICINE

## 2022-10-03 RX ORDER — OMEPRAZOLE 40 MG/1
40 CAPSULE, DELAYED RELEASE ORAL
Qty: 90 CAPSULE | Refills: 1 | Status: SHIPPED | OUTPATIENT
Start: 2022-10-03

## 2022-10-03 ASSESSMENT — PATIENT HEALTH QUESTIONNAIRE - PHQ9
SUM OF ALL RESPONSES TO PHQ QUESTIONS 1-9: 0
10. IF YOU CHECKED OFF ANY PROBLEMS, HOW DIFFICULT HAVE THESE PROBLEMS MADE IT FOR YOU TO DO YOUR WORK, TAKE CARE OF THINGS AT HOME, OR GET ALONG WITH OTHER PEOPLE: 0
SUM OF ALL RESPONSES TO PHQ9 QUESTIONS 1 & 2: 0
5. POOR APPETITE OR OVEREATING: 0
9. THOUGHTS THAT YOU WOULD BE BETTER OFF DEAD, OR OF HURTING YOURSELF: 0
3. TROUBLE FALLING OR STAYING ASLEEP: 0
1. LITTLE INTEREST OR PLEASURE IN DOING THINGS: 0
8. MOVING OR SPEAKING SO SLOWLY THAT OTHER PEOPLE COULD HAVE NOTICED. OR THE OPPOSITE, BEING SO FIGETY OR RESTLESS THAT YOU HAVE BEEN MOVING AROUND A LOT MORE THAN USUAL: 0
4. FEELING TIRED OR HAVING LITTLE ENERGY: 0
SUM OF ALL RESPONSES TO PHQ QUESTIONS 1-9: 0
2. FEELING DOWN, DEPRESSED OR HOPELESS: 0
SUM OF ALL RESPONSES TO PHQ QUESTIONS 1-9: 0
6. FEELING BAD ABOUT YOURSELF - OR THAT YOU ARE A FAILURE OR HAVE LET YOURSELF OR YOUR FAMILY DOWN: 0
SUM OF ALL RESPONSES TO PHQ QUESTIONS 1-9: 0
7. TROUBLE CONCENTRATING ON THINGS, SUCH AS READING THE NEWSPAPER OR WATCHING TELEVISION: 0

## 2022-10-03 NOTE — PROGRESS NOTES
Post-Discharge Transitional Care  Follow Up      Armin Obando   YOB: 1955    Date of Office Visit:  10/3/2022  Date of Hospital Admission: 9/9/22  Date of Hospital Discharge: 9/23/22  Risk of hospital readmission (high >=14%. Medium >=10%) :Readmission Risk Score: 33.3      Care management risk score Rising risk (score 2-5) and Complex Care (Scores >=6): No Risk Score On File     Non face to face  following discharge, date last encounter closed (first attempt may have been earlier): 09/26/2022    Call initiated 2 business days of discharge: Yes    ASSESSMENT/PLAN:   Below is the assessment and plan developed based on review of pertinent history, physical exam, labs, studies, and medications. Gastroesophageal reflux disease without esophagitis  Acute on chronic respiratory failure with hypoxia (HCC)  Interstitial lung disease ([address]  COVID-19  Primary spontaneous pneumothorax  Chronic diastolic congestive heart failure (HCC)  Closed burst fracture of lumbar vertebra, sequela  Type 2 diabetes mellitus without complication, without long-term current use of insulin ([address]  Primary hypertension  Hyperlipidemia with target LDL less than 130  Acquired hypothyroidism  Hospital discharge follow-up    Medical Decision Making: high complexity  No follow-ups on file. A total of 42 minutes spent on this visit to include face-to-face time, reviewing hospital records, and writing note. Subjective:   HPI:  Follow up of Hospital problems/diagnosis(es): Cute on chronic respiratory failure, interstitial lung disease, COVID-19 infection, right pneumothorax, chronic diastolic CHF, status post recent L1 burst fracture, type 2 diabetes, hypertension, hyperlipidemia, hypothyroidism    Inpatient course: Discharge summary reviewed- see chart. Interval history/Current status: Much improved. Currently on 3 L nasal cannula at home. Physical therapy starts tomorrow. Walking 100 feet twice daily.   Feels back to her baseline. Patient does want a switch from pantoprazole to omeprazole.      Patient Active Problem List   Diagnosis    Chest pain    HTN (hypertension)    Hyperlipidemia with target LDL less than 130    Hypokalemia    Insomnia    Trochanteric bursitis of both hips    Migraine    Syncope    Acute blood loss anemia    Fatigue    Hypothyroidism    Mild single current episode of major depressive disorder (HCC)    Anxiety    Pneumonia of both lower lobes due to infectious organism    A-fib ([address]    Atypical pneumonia    Acute bronchitis with bronchospasm    Acute on chronic diastolic CHF (congestive heart failure) ([facility])    Class 2 obesity with body mass index (BMI) of 39.0 to 39.9 in adult    Abnormal chest CT    Acute diastolic heart failure (HCC)    ILD (interstitial lung disease) (HCC)    Acute on chronic respiratory failure with hypoxia (HCC)    Restrictive lung disease    Abnormal CT of the chest    SOB (shortness of breath)    Acute cystitis without hematuria    Shortness of breath    Acute on chronic respiratory failure with hypoxemia (HCC)    Cryptogenic organizing pneumonia (HCC)    Pneumothorax of left lung after biopsy    COPD (chronic obstructive pulmonary disease) (HCC)    Type 2 diabetes mellitus without complication (HCC)    Hyponatremia    Numbness and tingling in left hand    Ulnar neuropathy at elbow of left upper extremity    Acute congestive heart failure (HCC)    Left wrist pain    Left wrist tendinitis    GERD (gastroesophageal reflux disease)    Toxic metabolic encephalopathy    VIRGINIE (acute kidney injury) ([address]    Lumbar radiculopathy    Acute respiratory failure with hypoxia (HCC)    Rib pain on left side    Elevated brain natriuretic peptide (BNP) level    Fracture of multiple ribs of left side    Interstitial lung disease (HCC)    Hypomagnesemia    Depression    Chronic diastolic congestive heart failure (HCC)    Burst fracture of lumbar vertebra (HCC)    H/O Spinal surgery    Severe malnutrition ([facility]    Pneumonia due to COVID-19 virus    Primary spontaneous pneumothorax    Secondary spontaneous pneumothorax    COVID-19       Medications listed as ordered at the time of discharge from hospital     Medication List            Accurate as of October 3, 2022  2:27 PM. If you have any questions, ask your nurse or doctor. CONTINUE taking these medications      acetaminophen 325 MG tablet  Commonly known as: TYLENOL     * albuterol (2.5 MG/3ML) 0.083% nebulizer solution  Commonly known as: PROVENTIL  INHALE THE CONTENTS OF 1 VIAL VIA NEBULIZER EVERY 6 HOURS AS NEEDED FOR WHEEZING     * albuterol sulfate  (90 Base) MCG/ACT inhaler  Commonly known as: PROVENTIL;VENTOLIN;PROAIR  INHALE 2 PUFFS INTO THE LUNGS EVERY 6 HOURS AS NEEDED FOR WHEEZING     atorvastatin 40 MG tablet  Commonly known as: LIPITOR  Take 1 tablet by mouth daily     benzonatate 200 MG capsule  Commonly known as: TESSALON  TAKE 1 CAPSULE BY MOUTH 3 TIMES A DAY AS NEEDED FOR COUGH     blood glucose test strips strip  Commonly known as: ASCENSIA AUTODISC VI;ONE TOUCH ULTRA TEST VI  1 each by In Vitro route daily As needed.      [employer] Allergy Relief-D12 5-120 MG per extended release tablet  Generic drug: loratadine-pseudoephedrine  TAKE 1 TABLET BY MOUTH TWICE A DAY     guaiFENesin 600 MG extended release tablet  Commonly known as: MUCINEX     hydrOXYzine HCl 10 MG tablet  Commonly known as: ATARAX  Take 1 tablet by mouth every 8 hours as needed for Itching TAKE 1 TO 3 TABLETS BY MOUTH 3 TIMES DAILY AS NEEDED FOR ITCHING     INSULIN SYRINGE 1CC/29G 29G X 1/2\" 1 ML Misc  1 each by Does not apply route 2 times daily     levothyroxine 125 MCG tablet  Commonly known as: SYNTHROID  Take 1 tablet by mouth Daily TAKE 1 TABLET BY MOUTH EVERY DAY     magnesium oxide 400 (240 Mg) MG tablet  Commonly known as: MAG-OX  Take 1 tablet by mouth daily     mirtazapine 30 MG tablet  Commonly known as: REMERON  Take 1 tablet by mouth nightly     nadolol 20 MG tablet  Commonly known as: CORGARD  Take 1 tablet by mouth daily     OXYGEN     pantoprazole 40 MG tablet  Commonly known as: PROTONIX  Take 1 tablet by mouth every morning (before breakfast)     Pirfenidone 267 MG Tabs  Take 3 tablets by mouth in the morning, at noon, and at bedtime     polycarbophil 625 MG tablet  Commonly known as: FIBERCON  Take 1 tablet by mouth at bedtime     potassium chloride 10 MEQ extended release tablet  Commonly known as: KLOR-CON  Take 1 tablet by mouth 2 times daily     sertraline 100 MG tablet  Commonly known as: ZOLOFT  Take 2 tablets by mouth daily     True Metrix Level 1 Low Soln  use as needed     True Metrix Meter Lindsay  Test daily and as needed     TRUEplus Lancets 33G Misc  Use daily     Vitamin D (Ergocalciferol) 90886 units Caps  Take 50,000 Units by mouth once a week           * This list has 2 medication(s) that are the same as other medications prescribed for you. Read the directions carefully, and ask your doctor or other care provider to review them with you. Medications marked \"taking\" at this time  No outpatient medications have been marked as taking for the 10/3/22 encounter (Appointment) with Zina Potter MD.        Medications patient taking as of now reconciled against medications ordered at time of hospital discharge: Yes    A comprehensive review of systems was negative except for what was noted in the HPI. Objective:    Patient-Reported Vitals  Patient-Reported Weight: 120 lb  Patient-Reported Pulse Oximetry: 98    No acute distress  Breathing comfortably. Speaking in complete sentences. Nasal cannula in place. Adams Obando, was evaluated through a synchronous (real-time) audio-video encounter. The patient (or guardian if applicable) is aware that this is a billable service, which includes applicable co-pays. This Virtual Visit was conducted with patient's (and/or legal guardian's) consent.  The visit was conducted pursuant to the emergency declaration under the [facility] authority and the [employer] General Act. Patient identification was verified, and a caregiver was present when appropriate. The patient was located at Home: [address] [place] [address]. Provider was located at [facility] ([place] [address]): [address] [place], [place] [address]Amy An electronic signature was used to authenticate this note.   --Shamika Leal MD

## 2022-10-04 ENCOUNTER — CARE COORDINATION (OUTPATIENT)
Dept: CASE MANAGEMENT | Age: 67
End: 2022-10-04

## 2022-10-04 NOTE — CARE COORDINATION
[facility] Care Transitions Follow Up Call      Patient: Mery Torres  Patient : 1955   MRN: [id number]  Reason for Admission: acute on chronic resp failure, hx of ILD on 3lpm O2 at home, COVID-19 infection, right pneumothorax, hx pulm fibrosis and cryptogenic organizing pna with chronic resp failure, chronic dCHF, s/p recent L1 burst fx (s/p T11-L3 PDFF with L1 corpectomy at [facility] ), DM2, HTN, HLD, hypothyroidism, iron deficiency anemia, depression, anxiety, diarrhea (Imodium prn, decrease tomato soup intake) -> home with [facility], completed TCM vv 10/3  Discharge Date: 22 RARS: Readmission Risk Score: 33.3    CTN attempted follow-up outreach to patient. Message left including CTN contact information for return call.      Follow Up  Future Appointments   Date Time Provider Calvin Mcmullen   2023 10:00 AM [facility] PULMONARY FUNCTION TESTING [facility] PFT [facility]   2023 11:15 AM MD DURAN Mackey PULTJ Dunn, RN  Care Transition Nurse  [phone] mobile

## 2022-10-07 ENCOUNTER — CARE COORDINATION (OUTPATIENT)
Dept: CASE MANAGEMENT | Age: 67
End: 2022-10-07

## 2022-10-07 NOTE — CARE COORDINATION
[facility] Care Transitions Follow Up Call      Patient: Sayra Rice  Patient : 1955   MRN: [id number]  Reason for Admission: acute on chronic resp failure, hx of ILD on 3lpm O2 at home, COVID-19 infection, right pneumothorax, hx pulm fibrosis and cryptogenic organizing pna with chronic resp failure, chronic dCHF, s/p recent L1 burst fx (s/p T11-L3 PDFF with L1 corpectomy at [facility] ), DM2, HTN, HLD, hypothyroidism, iron deficiency anemia, depression, anxiety, diarrhea (Imodium prn, decrease tomato soup intake) -> home with [facility], completed TCM vv 10/3  Discharge Date: 22 RARS: Readmission Risk Score: 33.3    CTN attempted follow-up outreach to patient. Message left including CTN contact information for return call.      Follow Up  Future Appointments   Date Time Provider Calvin Mcmullen   2023 10:00 AM [facility] PULMONARY FUNCTION TESTING [facility] PFT [facility]   2023 11:15 AM MD DURAN Villareal, RN  Care Transition Nurse  [phone] mobile

## 2022-10-11 ENCOUNTER — CARE COORDINATION (OUTPATIENT)
Dept: CASE MANAGEMENT | Age: 67
End: 2022-10-11

## 2022-10-11 NOTE — CARE COORDINATION
[facility] Care Transitions Follow Up Call      Patient: Syed Fay  Patient : 1955   MRN: [id number]  Reason for Admission: acute on chronic resp failure, hx of ILD on 3lpm O2 at home, COVID-19 infection, right pneumothorax, hx pulm fibrosis and cryptogenic organizing pna with chronic resp failure, chronic dCHF, s/p recent L1 burst fx (s/p T11-L3 PDFF with L1 corpectomy at [facility] ), DM2, HTN, HLD, hypothyroidism, iron deficiency anemia, depression, anxiety, diarrhea (Imodium prn, decrease tomato soup intake) -> home with [facility], completed TCM vv 10/3  Discharge Date: 22 RARS: Readmission Risk Score: 33.3    Unable to reach or leave message. Episode resolved at this time.      Follow Up  Future Appointments   Date Time Provider Calvin Mcmullen   2023 10:00 AM [facility] PULMONARY FUNCTION TESTING [facility] PFT [facility]   2023 11:15 AM MD ARAMIS Travis PUL MONO Alcocer, RN  Care Transition Nurse  [phone] mobile

## 2022-10-14 ENCOUNTER — TELEPHONE (OUTPATIENT)
Dept: FAMILY MEDICINE CLINIC | Age: 67
End: 2022-10-14

## 2022-10-14 NOTE — TELEPHONE ENCOUNTER
Sandra from Occupational Therapy with [facility] called stated patient fell this morning no injury just some bruises. She also wanted to inform you of medication interactions she came across. The tramadol interferes with the zoloft and zofran.

## 2022-10-19 ENCOUNTER — TELEPHONE (OUTPATIENT)
Dept: FAMILY MEDICINE CLINIC | Age: 67
End: 2022-10-19

## 2022-10-19 ENCOUNTER — APPOINTMENT (OUTPATIENT)
Dept: CT IMAGING | Age: 67
DRG: 393 | End: 2022-10-19
Payer: MEDICARE

## 2022-10-19 ENCOUNTER — APPOINTMENT (OUTPATIENT)
Dept: GENERAL RADIOLOGY | Age: 67
DRG: 393 | End: 2022-10-19
Payer: MEDICARE

## 2022-10-19 ENCOUNTER — TELEPHONE (OUTPATIENT)
Dept: OTHER | Facility: CLINIC | Age: 67
End: 2022-10-19

## 2022-10-19 ENCOUNTER — HOSPITAL ENCOUNTER (INPATIENT)
Age: 67
LOS: 3 days | Discharge: HOME | DRG: 393 | End: 2022-10-22
Attending: STUDENT IN AN ORGANIZED HEALTH CARE EDUCATION/TRAINING PROGRAM | Admitting: INTERNAL MEDICINE
Payer: MEDICARE

## 2022-10-19 DIAGNOSIS — J96.21 ACUTE ON CHRONIC RESPIRATORY FAILURE WITH HYPOXIA (HCC): Primary | ICD-10-CM

## 2022-10-19 DIAGNOSIS — R19.7 DIARRHEA, UNSPECIFIED TYPE: ICD-10-CM

## 2022-10-19 PROBLEM — J96.20 ACUTE ON CHRONIC RESPIRATORY FAILURE (HCC): Status: ACTIVE | Noted: 2022-10-19

## 2022-10-19 LAB
A/G RATIO: 0.8 (ref 1.1–2.2)
ALBUMIN SERPL-MCNC: 3.3 G/DL (ref 3.4–5)
ALP BLD-CCNC: 136 U/L (ref 40–129)
ALT SERPL-CCNC: 13 U/L (ref 10–40)
ANION GAP SERPL CALCULATED.3IONS-SCNC: 7 MMOL/L (ref 3–16)
AST SERPL-CCNC: 33 U/L (ref 15–37)
BASE EXCESS VENOUS: 2 MMOL/L (ref -3–3)
BASOPHILS ABSOLUTE: 0.1 K/UL (ref 0–0.2)
BASOPHILS RELATIVE PERCENT: 1.1 %
BILIRUB SERPL-MCNC: 0.5 MG/DL (ref 0–1)
BILIRUBIN URINE: NEGATIVE
BLOOD, URINE: NEGATIVE
BUN BLDV-MCNC: 10 MG/DL (ref 7–20)
C DIFF TOXIN/ANTIGEN: NORMAL
CALCIUM SERPL-MCNC: 10.2 MG/DL (ref 8.3–10.6)
CARBOXYHEMOGLOBIN: 1.4 % (ref 0–1.5)
CHLORIDE BLD-SCNC: 102 MMOL/L (ref 99–110)
CLARITY: CLEAR
CO2: 32 MMOL/L (ref 21–32)
COLOR: YELLOW
CREAT SERPL-MCNC: <0.5 MG/DL (ref 0.6–1.2)
D DIMER: 2.65 UG/ML FEU (ref 0–0.6)
EKG ATRIAL RATE: 89 BPM
EKG DIAGNOSIS: NORMAL
EKG P AXIS: 41 DEGREES
EKG P-R INTERVAL: 138 MS
EKG Q-T INTERVAL: 352 MS
EKG QRS DURATION: 70 MS
EKG QTC CALCULATION (BAZETT): 428 MS
EKG R AXIS: 13 DEGREES
EKG T AXIS: -18 DEGREES
EKG VENTRICULAR RATE: 89 BPM
EOSINOPHILS ABSOLUTE: 0.4 K/UL (ref 0–0.6)
EOSINOPHILS RELATIVE PERCENT: 4.6 %
EPITHELIAL CELLS, UA: ABNORMAL /HPF (ref 0–5)
GFR SERPL CREATININE-BSD FRML MDRD: >60 ML/MIN/{1.73_M2}
GLUCOSE BLD-MCNC: 131 MG/DL (ref 70–99)
GLUCOSE URINE: NEGATIVE MG/DL
HCO3 VENOUS: 27.8 MMOL/L (ref 23–29)
HCT VFR BLD CALC: 33.9 % (ref 36–48)
HEMOGLOBIN: 10.4 G/DL (ref 12–16)
INFLUENZA A: NOT DETECTED
INFLUENZA B: NOT DETECTED
KETONES, URINE: NEGATIVE MG/DL
LACTIC ACID, SEPSIS: 1.6 MMOL/L (ref 0.4–1.9)
LEUKOCYTE ESTERASE, URINE: NEGATIVE
LIPASE: 93 U/L (ref 13–60)
LYMPHOCYTES ABSOLUTE: 1.7 K/UL (ref 1–5.1)
LYMPHOCYTES RELATIVE PERCENT: 19.4 %
MCH RBC QN AUTO: 24.7 PG (ref 26–34)
MCHC RBC AUTO-ENTMCNC: 30.6 G/DL (ref 31–36)
MCV RBC AUTO: 80.7 FL (ref 80–100)
METHEMOGLOBIN VENOUS: 0 %
MICROSCOPIC EXAMINATION: ABNORMAL
MONOCYTES ABSOLUTE: 0.5 K/UL (ref 0–1.3)
MONOCYTES RELATIVE PERCENT: 6.4 %
MUCUS: ABNORMAL /LPF
NEUTROPHILS ABSOLUTE: 5.9 K/UL (ref 1.7–7.7)
NEUTROPHILS RELATIVE PERCENT: 68.5 %
NITRITE, URINE: NEGATIVE
O2 CONTENT, VEN: 13 VOL %
O2 SAT, VEN: 82 %
O2 THERAPY: ABNORMAL
PCO2, VEN: 48.7 MMHG (ref 40–50)
PDW BLD-RTO: 16 % (ref 12.4–15.4)
PH UA: 6.5 (ref 5–8)
PH VENOUS: 7.37 (ref 7.35–7.45)
PLATELET # BLD: 261 K/UL (ref 135–450)
PMV BLD AUTO: 7.6 FL (ref 5–10.5)
PO2, VEN: 47.2 MMHG (ref 25–40)
POTASSIUM REFLEX MAGNESIUM: 4.2 MMOL/L (ref 3.5–5.1)
PRO-BNP: 1234 PG/ML (ref 0–124)
PROTEIN UA: NEGATIVE MG/DL
RBC # BLD: 4.2 M/UL (ref 4–5.2)
RBC UA: ABNORMAL /HPF (ref 0–4)
SARS-COV-2 RNA, RT PCR: NOT DETECTED
SODIUM BLD-SCNC: 141 MMOL/L (ref 136–145)
SPECIFIC GRAVITY UA: 1.01 (ref 1–1.03)
TCO2 CALC VENOUS: 29 MMOL/L
TOTAL PROTEIN: 7.4 G/DL (ref 6.4–8.2)
TROPONIN: <0.01 NG/ML
URINE TYPE: ABNORMAL
UROBILINOGEN, URINE: 0.2 E.U./DL
WBC # BLD: 8.6 K/UL (ref 4–11)
WBC UA: ABNORMAL /HPF (ref 0–5)

## 2022-10-19 PROCEDURE — 87636 SARSCOV2 & INF A&B AMP PRB: CPT

## 2022-10-19 PROCEDURE — 80053 COMPREHEN METABOLIC PANEL: CPT

## 2022-10-19 PROCEDURE — 71045 X-RAY EXAM CHEST 1 VIEW: CPT

## 2022-10-19 PROCEDURE — 2580000003 HC RX 258: Performed by: STUDENT IN AN ORGANIZED HEALTH CARE EDUCATION/TRAINING PROGRAM

## 2022-10-19 PROCEDURE — 93005 ELECTROCARDIOGRAM TRACING: CPT | Performed by: STUDENT IN AN ORGANIZED HEALTH CARE EDUCATION/TRAINING PROGRAM

## 2022-10-19 PROCEDURE — 6370000000 HC RX 637 (ALT 250 FOR IP): Performed by: STUDENT IN AN ORGANIZED HEALTH CARE EDUCATION/TRAINING PROGRAM

## 2022-10-19 PROCEDURE — 82803 BLOOD GASES ANY COMBINATION: CPT

## 2022-10-19 PROCEDURE — 99285 EMERGENCY DEPT VISIT HI MDM: CPT

## 2022-10-19 PROCEDURE — 74177 CT ABD & PELVIS W/CONTRAST: CPT

## 2022-10-19 PROCEDURE — 96374 THER/PROPH/DIAG INJ IV PUSH: CPT

## 2022-10-19 PROCEDURE — 96375 TX/PRO/DX INJ NEW DRUG ADDON: CPT

## 2022-10-19 PROCEDURE — 83880 ASSAY OF NATRIURETIC PEPTIDE: CPT

## 2022-10-19 PROCEDURE — 87324 CLOSTRIDIUM AG IA: CPT

## 2022-10-19 PROCEDURE — 87506 IADNA-DNA/RNA PROBE TQ 6-11: CPT

## 2022-10-19 PROCEDURE — 6360000004 HC RX CONTRAST MEDICATION: Performed by: STUDENT IN AN ORGANIZED HEALTH CARE EDUCATION/TRAINING PROGRAM

## 2022-10-19 PROCEDURE — 84484 ASSAY OF TROPONIN QUANT: CPT

## 2022-10-19 PROCEDURE — 96361 HYDRATE IV INFUSION ADD-ON: CPT

## 2022-10-19 PROCEDURE — 83605 ASSAY OF LACTIC ACID: CPT

## 2022-10-19 PROCEDURE — 71260 CT THORAX DX C+: CPT | Performed by: STUDENT IN AN ORGANIZED HEALTH CARE EDUCATION/TRAINING PROGRAM

## 2022-10-19 PROCEDURE — 87449 NOS EACH ORGANISM AG IA: CPT

## 2022-10-19 PROCEDURE — 36415 COLL VENOUS BLD VENIPUNCTURE: CPT

## 2022-10-19 PROCEDURE — 93010 ELECTROCARDIOGRAM REPORT: CPT | Performed by: INTERNAL MEDICINE

## 2022-10-19 PROCEDURE — 85379 FIBRIN DEGRADATION QUANT: CPT

## 2022-10-19 PROCEDURE — 85025 COMPLETE CBC W/AUTO DIFF WBC: CPT

## 2022-10-19 PROCEDURE — 2060000000 HC ICU INTERMEDIATE R&B

## 2022-10-19 PROCEDURE — 83690 ASSAY OF LIPASE: CPT

## 2022-10-19 PROCEDURE — 6360000002 HC RX W HCPCS: Performed by: STUDENT IN AN ORGANIZED HEALTH CARE EDUCATION/TRAINING PROGRAM

## 2022-10-19 PROCEDURE — 81001 URINALYSIS AUTO W/SCOPE: CPT

## 2022-10-19 RX ORDER — IPRATROPIUM BROMIDE AND ALBUTEROL SULFATE 2.5; .5 MG/3ML; MG/3ML
1 SOLUTION RESPIRATORY (INHALATION) ONCE
Status: COMPLETED | OUTPATIENT
Start: 2022-10-19 | End: 2022-10-19

## 2022-10-19 RX ORDER — SODIUM CHLORIDE 0.9 % (FLUSH) 0.9 %
5-40 SYRINGE (ML) INJECTION PRN
Status: DISCONTINUED | OUTPATIENT
Start: 2022-10-19 | End: 2022-10-22 | Stop reason: HOSPADM

## 2022-10-19 RX ORDER — METHYLPREDNISOLONE SODIUM SUCCINATE 125 MG/2ML
125 INJECTION, POWDER, LYOPHILIZED, FOR SOLUTION INTRAMUSCULAR; INTRAVENOUS ONCE
Status: COMPLETED | OUTPATIENT
Start: 2022-10-19 | End: 2022-10-19

## 2022-10-19 RX ORDER — INSULIN LISPRO 100 [IU]/ML
0-4 INJECTION, SOLUTION INTRAVENOUS; SUBCUTANEOUS NIGHTLY
Status: DISCONTINUED | OUTPATIENT
Start: 2022-10-19 | End: 2022-10-22 | Stop reason: HOSPADM

## 2022-10-19 RX ORDER — SODIUM CHLORIDE 0.9 % (FLUSH) 0.9 %
5-40 SYRINGE (ML) INJECTION EVERY 12 HOURS SCHEDULED
Status: DISCONTINUED | OUTPATIENT
Start: 2022-10-19 | End: 2022-10-22 | Stop reason: HOSPADM

## 2022-10-19 RX ORDER — FUROSEMIDE 10 MG/ML
20 INJECTION INTRAMUSCULAR; INTRAVENOUS ONCE
Status: COMPLETED | OUTPATIENT
Start: 2022-10-19 | End: 2022-10-19

## 2022-10-19 RX ORDER — ACETAMINOPHEN 325 MG/1
650 TABLET ORAL EVERY 6 HOURS PRN
Status: DISCONTINUED | OUTPATIENT
Start: 2022-10-19 | End: 2022-10-22 | Stop reason: HOSPADM

## 2022-10-19 RX ORDER — ONDANSETRON 2 MG/ML
4 INJECTION INTRAMUSCULAR; INTRAVENOUS EVERY 6 HOURS PRN
Status: DISCONTINUED | OUTPATIENT
Start: 2022-10-19 | End: 2022-10-22 | Stop reason: HOSPADM

## 2022-10-19 RX ORDER — ONDANSETRON 4 MG/1
4 TABLET, ORALLY DISINTEGRATING ORAL EVERY 8 HOURS PRN
Status: DISCONTINUED | OUTPATIENT
Start: 2022-10-19 | End: 2022-10-22 | Stop reason: HOSPADM

## 2022-10-19 RX ORDER — ACETAMINOPHEN 650 MG/1
650 SUPPOSITORY RECTAL EVERY 6 HOURS PRN
Status: DISCONTINUED | OUTPATIENT
Start: 2022-10-19 | End: 2022-10-22 | Stop reason: HOSPADM

## 2022-10-19 RX ORDER — ATORVASTATIN CALCIUM 40 MG/1
40 TABLET, FILM COATED ORAL DAILY
Status: DISCONTINUED | OUTPATIENT
Start: 2022-10-20 | End: 2022-10-22 | Stop reason: HOSPADM

## 2022-10-19 RX ORDER — IPRATROPIUM BROMIDE AND ALBUTEROL SULFATE 2.5; .5 MG/3ML; MG/3ML
1 SOLUTION RESPIRATORY (INHALATION)
Status: DISCONTINUED | OUTPATIENT
Start: 2022-10-20 | End: 2022-10-20

## 2022-10-19 RX ORDER — SODIUM CHLORIDE 9 MG/ML
INJECTION, SOLUTION INTRAVENOUS PRN
Status: DISCONTINUED | OUTPATIENT
Start: 2022-10-19 | End: 2022-10-22 | Stop reason: HOSPADM

## 2022-10-19 RX ORDER — DEXTROSE MONOHYDRATE 100 MG/ML
INJECTION, SOLUTION INTRAVENOUS CONTINUOUS PRN
Status: DISCONTINUED | OUTPATIENT
Start: 2022-10-19 | End: 2022-10-22 | Stop reason: HOSPADM

## 2022-10-19 RX ORDER — INSULIN LISPRO 100 [IU]/ML
0-8 INJECTION, SOLUTION INTRAVENOUS; SUBCUTANEOUS
Status: DISCONTINUED | OUTPATIENT
Start: 2022-10-20 | End: 2022-10-22 | Stop reason: HOSPADM

## 2022-10-19 RX ORDER — PANTOPRAZOLE SODIUM 40 MG/1
40 TABLET, DELAYED RELEASE ORAL
Status: DISCONTINUED | OUTPATIENT
Start: 2022-10-20 | End: 2022-10-22 | Stop reason: HOSPADM

## 2022-10-19 RX ORDER — 0.9 % SODIUM CHLORIDE 0.9 %
1000 INTRAVENOUS SOLUTION INTRAVENOUS ONCE
Status: COMPLETED | OUTPATIENT
Start: 2022-10-19 | End: 2022-10-19

## 2022-10-19 RX ORDER — ENOXAPARIN SODIUM 100 MG/ML
40 INJECTION SUBCUTANEOUS DAILY
Status: DISCONTINUED | OUTPATIENT
Start: 2022-10-20 | End: 2022-10-20

## 2022-10-19 RX ADMIN — IOPAMIDOL 85 ML: 755 INJECTION, SOLUTION INTRAVENOUS at 23:10

## 2022-10-19 RX ADMIN — FUROSEMIDE 20 MG: 10 INJECTION, SOLUTION INTRAMUSCULAR; INTRAVENOUS at 21:32

## 2022-10-19 RX ADMIN — METHYLPREDNISOLONE SODIUM SUCCINATE 125 MG: 125 INJECTION, POWDER, FOR SOLUTION INTRAMUSCULAR; INTRAVENOUS at 21:03

## 2022-10-19 RX ADMIN — IOPAMIDOL 75 ML: 755 INJECTION, SOLUTION INTRAVENOUS at 18:29

## 2022-10-19 RX ADMIN — IPRATROPIUM BROMIDE AND ALBUTEROL SULFATE 1 AMPULE: 2.5; .5 SOLUTION RESPIRATORY (INHALATION) at 21:03

## 2022-10-19 RX ADMIN — SODIUM CHLORIDE 1000 ML: 9 INJECTION, SOLUTION INTRAVENOUS at 16:53

## 2022-10-19 NOTE — ED PROVIDER NOTES
[address] ED      CHIEF COMPLAINT  Diarrhea (Patient comes in due to diarrhea and upset stomach x2 days. )     HISTORY OF PRESENT ILLNESS  Mo Lr is a 69 y.o. female  who presents to the ED complaining of diarrhea, generalized malaise for the past 2 days. Patient states that symptoms started after she fell asleep a few days ago and her oxygen came off of her face and she states \"I have not been right since. \"  She denies any abdominal pain. Denies fevers but states that she has been feeling cold. She denies nausea or vomiting. Denies chest pain. She is normally on 4 L of oxygen and this is unchanged. She denies any worsening shortness of breath. Denies other complaints or concerns. No other complaints, modifying factors or associated symptoms. I have reviewed the following from the nursing documentation. Past Medical History:   Diagnosis Date    A-fib ([facility] [address]     Anxiety     Cryptogenic organizing pneumonia ([facility] [address]     Depression     GERD (gastroesophageal reflux disease)     Hyperlipidemia     On home oxygen therapy     uses O2 3L prn    Rash     Thyroid disease     hypothyroidism     Past Surgical History:   Procedure Laterality Date    ARM SURGERY Left 3/2/2020    LEFT ULNAR NERVE DECOMPRESSION AT THE ELBOW performed by Denisse Lla MD at [facility] N/A 6/27/2019    EBUS WF W/ANES.  performed by Fernando Carreon MD at [place] [address]  6/27/2019    BRONCHOSCOPY/TRANSBRONCHIAL NEEDLE BIOPSY performed by Fernando Carreon MD at [place] [address]  6/27/2019    BRONCHOSCOPY/TRANSBRONCHIAL LUNG BIOPSY performed by Fernando Carreon MD at [place] [address]  6/27/2019    BRONCHOSCOPY ALVEOLAR LAVAGE performed by Fernando Carreon MD at [place] [address]  07/10/2019    BRONCHOSCOPY N/A 7/10/2019    BRONCHOSCOPY ALVEOLAR LAVAGE performed by Chas Robles MD at [place] [address] Bilateral 08/06/2019    BRONCHOSCOPY N/A 8/6/2019    BRONCHOSCOPY ALVEOLAR LAVAGE performed by Sonja Dc MD at [facility] [address] N/A 12/24/2019    BRONCHOSCOPY ALVEOLAR LAVAGE performed by Francesca Wylie MD at [facility]      COLONOSCOPY N/A 8/25/2022    COLONOSCOPY POLYPECTOMY SNARE/COLD BIOPSY performed by Cyrus Cochran MD at [address]  9/15/2022    CT GUIDED CHEST TUBE 9/15/2022 [facility] CT SCAN    HYSTERECTOMY, TOTAL ABDOMINAL (CERVIX REMOVED)      partial     Family History   Problem Relation Age of Onset    Diabetes Mother     High Blood Pressure Mother     Asthma Sister     Other Father      Social History     Socioeconomic History    Marital status:      Spouse name: Not on file    Number of children: 7    Years of education: Not on file    Highest education level: Not on file   Occupational History    Not on file   Tobacco Use    Smoking status: Never    Smokeless tobacco: Never   Vaping Use    Vaping Use: Never used   Substance and Sexual Activity    Alcohol use: No    Drug use: No    Sexual activity: Not Currently   Other Topics Concern    Not on file   Social History Narrative    Not on file     Social Determinants of Health     Financial Resource Strain: Not on file   Food Insecurity: Not on file   Transportation Needs: No Transportation Needs    Lack of Transportation (Medical): No    Lack of Transportation (Non-Medical): No   Physical Activity: Not on file   Stress: Not on file   Social Connections: Not on file   Intimate Partner Violence: Not on file   Housing Stability: Not on file     No current facility-administered medications for this encounter.      Current Outpatient Medications   Medication Sig Dispense Refill    omeprazole (PRILOSEC) 40 MG delayed release capsule Take 1 capsule by mouth every morning (before breakfast) 90 capsule 1    magnesium oxide (MAG-OX) 400 (240 Mg) MG tablet Take 1 tablet by mouth daily 30 tablet 1    potassium chloride (KLOR-CON) 10 MEQ extended release tablet Take 1 tablet by mouth 2 times daily 60 tablet 1    polycarbophil (FIBERCON) 625 MG tablet Take 1 tablet by mouth at bedtime  4    Ergocalciferol (VITAMIN D) 42535 units CAPS Take 50,000 Units by mouth once a week 5 capsule 1    Pirfenidone 267 MG TABS Take 3 tablets by mouth in the morning, at noon, and at bedtime 270 tablet 3    albuterol sulfate HFA (PROVENTIL;VENTOLIN;PROAIR) 108 (90 Base) MCG/ACT inhaler INHALE 2 PUFFS INTO THE LUNGS EVERY 6 HOURS AS NEEDED FOR WHEEZING 3 each 1    atorvastatin (LIPITOR) 40 MG tablet Take 1 tablet by mouth daily 90 tablet 1    levothyroxine (SYNTHROID) 125 MCG tablet Take 1 tablet by mouth Daily TAKE 1 TABLET BY MOUTH EVERY DAY 90 tablet 1    sertraline (ZOLOFT) 100 MG tablet Take 2 tablets by mouth daily 180 tablet 1    mirtazapine (REMERON) 30 MG tablet Take 1 tablet by mouth nightly 90 tablet 1    nadolol (CORGARD) 20 MG tablet Take 1 tablet by mouth daily 90 tablet 1    loratadine-pseudoephedrine ([employer] ALLERGY RELIEF-D12) 5-120 MG per extended release tablet TAKE 1 TABLET BY MOUTH TWICE A DAY 60 tablet 5    benzonatate (TESSALON) 200 MG capsule TAKE 1 CAPSULE BY MOUTH 3 TIMES A DAY AS NEEDED FOR COUGH 90 capsule 5    hydrOXYzine (ATARAX) 10 MG tablet Take 1 tablet by mouth every 8 hours as needed for Itching TAKE 1 TO 3 TABLETS BY MOUTH 3 TIMES DAILY AS NEEDED FOR ITCHING 90 tablet 1    acetaminophen (TYLENOL) 325 MG tablet       guaiFENesin (MUCINEX) 600 MG extended release tablet Take 1,200 mg by mouth every morning      albuterol (PROVENTIL) (2.5 MG/3ML) 0.083% nebulizer solution INHALE THE CONTENTS OF 1 VIAL VIA NEBULIZER EVERY 6 HOURS AS NEEDED FOR WHEEZING 720 mL 1    Blood Glucose Monitoring Suppl (TRUE METRIX METER) PUJA Test daily and as needed 1 each 0    blood glucose test strips (ASCENSIA AUTODISC VI;ONE TOUCH ULTRA TEST VI) strip 1 each by In Vitro route daily As needed.  100 each 3 TRUEplus Lancets 33G MISC Use daily 100 each 3    Blood Glucose Calibration (TRUE METRIX LEVEL 1) Low SOLN use as needed 1 each 2    INSULIN SYRINGE 1CC/29G 29G X 1/2\" 1 ML MISC 1 each by Does not apply route 2 times daily 100 each 5    OXYGEN Inhale 3.5 L into the lungs       Allergies   Allergen Reactions    Penicillins Anaphylaxis     Tolerates Meropenem. Cefuroxime      Tolerates Meropenem. Doxycycline Hives    Imitrex [Sumatriptan] Other (See Comments)     Feels like pins and needles    Meperidine     Other Other (See Comments)     Blood pressure drops, light headed    Sulfa Antibiotics Hives    Keflex [Cephalexin] Itching and Rash     Tolerates Meropenem. Tape Janit Nephew Tape] Rash       REVIEW OF SYSTEMS  10 systems reviewed, pertinent positives per HPI otherwise noted to be negative. PHYSICAL EXAM  BP (!) 142/82   Pulse (!) 101   Temp 98.2 °F (36.8 °C)   Resp 20   SpO2 100%    GENERAL APPEARANCE: Awake and alert. Cooperative. No acute distress. HENT: Normocephalic. Atraumatic. Mucous membranes are tacky. NECK: Supple. EYES: PERRL. EOM's grossly intact. HEART/CHEST: RRR. No murmurs. LUNGS: Respirations unlabored. CTAB. Good air exchange. Speaking comfortably in full sentences. ABDOMEN: No tenderness. Soft. Non-distended. No masses. No organomegaly. No guarding or rebound. MUSCULOSKELETAL: No extremity edema. Compartments soft. No deformity. No tenderness in the extremities. All extremities neurovascularly intact. SKIN: Warm and dry. No acute rashes. NEUROLOGICAL: Alert and oriented. CN's 2-12 intact. No gross facial drooping. Strength 5/5, sensation intact. PSYCHIATRIC: Normal mood and affect. LABS  I have reviewed all labs for this visit.    Results for orders placed or performed during the hospital encounter of 10/19/22   Clostridium difficile toxin/antigen    Specimen: Stool   Result Value Ref Range    C.diff Toxin/Antigen       Negative for Clostridium difficile antigen and toxin  Normal Range: Negative     COVID-19 & Influenza Combo    Specimen: Nasopharyngeal Swab   Result Value Ref Range    SARS-CoV-2 RNA, RT PCR NOT DETECTED NOT DETECTED    INFLUENZA A NOT DETECTED NOT DETECTED    INFLUENZA B NOT DETECTED NOT DETECTED   CBC with Auto Differential   Result Value Ref Range    WBC 8.6 4.0 - 11.0 K/uL    RBC 4.20 4.00 - 5.20 M/uL    Hemoglobin 10.4 (L) 12.0 - 16.0 g/dL    Hematocrit 33.9 (L) 36.0 - 48.0 %    MCV 80.7 80.0 - 100.0 fL    MCH 24.7 (L) 26.0 - 34.0 pg    MCHC 30.6 (L) 31.0 - 36.0 g/dL    RDW 16.0 (H) 12.4 - 15.4 %    Platelets 836 221 - 424 K/uL    MPV 7.6 5.0 - 10.5 fL    Neutrophils % 68.5 %    Lymphocytes % 19.4 %    Monocytes % 6.4 %    Eosinophils % 4.6 %    Basophils % 1.1 %    Neutrophils Absolute 5.9 1.7 - 7.7 K/uL    Lymphocytes Absolute 1.7 1.0 - 5.1 K/uL    Monocytes Absolute 0.5 0.0 - 1.3 K/uL    Eosinophils Absolute 0.4 0.0 - 0.6 K/uL    Basophils Absolute 0.1 0.0 - 0.2 K/uL   Comprehensive Metabolic Panel w/ Reflex to MG   Result Value Ref Range    Sodium 141 136 - 145 mmol/L    Potassium reflex Magnesium 4.2 3.5 - 5.1 mmol/L    Chloride 102 99 - 110 mmol/L    CO2 32 21 - 32 mmol/L    Anion Gap 7 3 - 16    Glucose 131 (H) 70 - 99 mg/dL    BUN 10 7 - 20 mg/dL    Creatinine <0.5 (L) 0.6 - 1.2 mg/dL    Est, Glom Filt Rate >60 >60    Calcium 10.2 8.3 - 10.6 mg/dL    Total Protein 7.4 6.4 - 8.2 g/dL    Albumin 3.3 (L) 3.4 - 5.0 g/dL    Albumin/Globulin Ratio 0.8 (L) 1.1 - 2.2    Total Bilirubin 0.5 0.0 - 1.0 mg/dL    Alkaline Phosphatase 136 (H) 40 - 129 U/L    ALT 13 10 - 40 U/L    AST 33 15 - 37 U/L   Troponin   Result Value Ref Range    Troponin <0.01 <0.01 ng/mL   Lactate, Sepsis   Result Value Ref Range    Lactic Acid, Sepsis 1.6 0.4 - 1.9 mmol/L   Lipase   Result Value Ref Range    Lipase 93.0 (H) 13.0 - 60.0 U/L   Urinalysis with Microscopic   Result Value Ref Range    Color, UA Yellow Straw/Yellow    Clarity, UA Clear Clear    Glucose, Ur Negative Negative mg/dL    Bilirubin Urine Negative Negative    Ketones, Urine Negative Negative mg/dL    Specific Gravity, UA 1.010 1.005 - 1.030    Blood, Urine Negative Negative    pH, UA 6.5 5.0 - 8.0    Protein, UA Negative Negative mg/dL    Urobilinogen, Urine 0.2 <2.0 E.U./dL    Nitrite, Urine Negative Negative    Leukocyte Esterase, Urine Negative Negative    Microscopic Examination Not Indicated     Urine Type NotGiven     Mucus, UA Rare (A) None Seen /LPF    WBC, UA None seen 0 - 5 /HPF    RBC, UA None seen 0 - 4 /HPF    Epithelial Cells, UA 0-1 0 - 5 /HPF   Blood gas, venous   Result Value Ref Range    pH, Jasper 7.374 7.350 - 7.450    pCO2, Jasper 48.7 40.0 - 50.0 mmHg    pO2, Jasper 47.2 (H) 25.0 - 40.0 mmHg    HCO3, Venous 27.8 23.0 - 29.0 mmol/L    Base Excess, Jasper 2.0 -3.0 - 3.0 mmol/L    O2 Sat, Jasper 82 Not Established %    Carboxyhemoglobin 1.4 0.0 - 1.5 %    MetHgb, Jasper 0.0 <1.5 %    TC02 (Calc), Jasper 29 Not Established mmol/L    O2 Content, Jasper 13 Not Established VOL %    O2 Therapy Unknown    Brain Natriuretic Peptide   Result Value Ref Range    Pro-BNP 1,234 (H) 0 - 124 pg/mL   D-Dimer, Quantitative   Result Value Ref Range    D-Dimer, Quant 2.65 (H) 0.00 - 0.60 ug/mL FEU   EKG 12 Lead   Result Value Ref Range    Ventricular Rate 89 BPM    Atrial Rate 89 BPM    P-R Interval 138 ms    QRS Duration 70 ms    Q-T Interval 352 ms    QTc Calculation (Bazett) 428 ms    P Axis 41 degrees    R Axis 13 degrees    T Axis -18 degrees    Diagnosis       Normal sinus rhythmLeft atrial enlargementRSR' or QR pattern in V1 suggests right ventricular conduction delayT wave abnormality, consider inferior ischemiaT wave abnormality, consider anterior ischemiaAbnormal ECGWhen compared with ECG of 09-SEP-2022 17:34,RSR' pattern in V1 is now PresentT wave inversion more evident in Anterior leads and inferior leadsConfirmed by FAHAD Villanueva MD ([id number]) on 10/19/2022 5:59:42 PM         ECG  The Ekg interpreted by me shows  normal sinus rhythm with a rate of 89  Axis is   Normal  QTc is  normal  Intervals and Durations are unremarkable. ST Segments: nonspecific changes  Nonspecific T wave abnormalities in V2 and V3 are increased, otherwise similar compared to previous performed 9/9/2022    RADIOLOGY  XR CHEST PORTABLE   Final Result   1. Severe radiographic findings typical of pulmonary fibrosis. Correlate   with clinical history. 2. No superimposed pulmonary infiltrate evident radiographically. 3. Calcific atherosclerosis aorta. 4. Cardiomegaly. CT ABDOMEN PELVIS W IV CONTRAST Additional Contrast? None   Final Result   No acute abnormality within the abdomen/pelvis. Stable fibrotic changes at   the lung bases. CT CHEST PULMONARY EMBOLISM W CONTRAST    (Results Pending)     ED COURSE/MDM  Patient seen and evaluated. Old records reviewed. Labs and imaging reviewed and results discussed with patient. Patient is a 61-year-old female, presenting with concerns for generalized malaise, diarrhea. Full HPI as detailed above. Upon arrival in the ED, vitals remarkable for mild tachycardia, otherwise reassuring. Patient was resting comfortably and is in no acute distress. Abdominal exam is benign however given her symptoms and age I did elect to perform a CT abdomen pelvis which was negative for any acute concerning findings. Attempted to obtain a stool sample which she was unable to provide here in the department. Her last echocardiogram was reviewed and showed a normal ejection fraction, she did appear clinically dehydrated and was treated with 1 L of IV fluids. Patient then had unexplained episode of desaturation on her baseline 4 L of oxygen down to the 70s. She was in respiratory distress at this time was placed on high flow nasal cannula with improvement of her oxygen saturations. Her VBG is actually reassuring.   With concern for possible fluid overload, although she does not appear fluid overloaded, she was treated with 1 dose of Lasix. COVID and flu are negative but she did have COVID a few weeks ago. D-dimer is markedly elevated and she will be sent for CT of her chest however chest x-ray shows severe pulmonary fibrosis. She was also treated with breathing treatments and steroids. She will be hospitalized for further work-up and treatment of her acute on chronic respiratory failure. She is comfortable in agreement with plan of care. The total Critical Care time is 45 minutes which excludes separately billable procedures. I, Dr. Kvng Anguiano MD, am the primary clinician of record. Is this patient to be included in the SEP-1 Core Measure? No   Exclusion criteria - the patient is NOT to be included for SEP-1 Core Measure due to: Infection is not suspected     During the patient's ED course, the patient was given:  Medications   0.9 % sodium chloride bolus (0 mLs IntraVENous Stopped 10/19/22 1914)   iopamidol (ISOVUE-370) 76 % injection 75 mL (75 mLs IntraVENous Given 10/19/22 1829)   ipratropium-albuterol (DUONEB) nebulizer solution 1 ampule (1 ampule Inhalation Given 10/19/22 2103)   methylPREDNISolone sodium (SOLU-MEDROL) injection 125 mg (125 mg IntraVENous Given 10/19/22 2103)   furosemide (LASIX) injection 20 mg (20 mg IntraVENous Given 10/19/22 2132)   iopamidol (ISOVUE-370) 76 % injection 85 mL (85 mLs IntraVENous Given 10/19/22 2310)        CLINICAL IMPRESSION  1. Acute on chronic respiratory failure with hypoxia (HCC)    2. Diarrhea, unspecified type        Blood pressure (!) 142/82, pulse (!) 101, temperature 98.2 °F (36.8 °C), resp. rate 20, SpO2 100 %, not currently breastfeeding. DISPOSITION  Armin Obando was admitted in fair condition. Patient was given scripts for the following medications. I counseled patient how to take these medications. New Prescriptions    No medications on file       Follow-up with:  No follow-up provider specified.     DISCLAIMER: This chart was created using [id number] [employer]. Efforts were made by me to ensure accuracy, however some errors may be present due to limitations of this technology and occasionally words are not transcribed correctly.        Lizett Stephens MD  10/19/22 0916

## 2022-10-19 NOTE — TELEPHONE ENCOUNTER
Writer contacted Dr. Chaka Fuentes  to inform of 30 day readmission risk. Dr. Chaka Fuentes informed writer there is no decision on disposition at this time.       Call Back: If you need to call back to inform of disposition you can contact me at [phone]

## 2022-10-19 NOTE — ED NOTES
Pt. Changed into brief and new linens at this time. Pt. With small loose BM. BM too small in quantity to send off specimen.      Tono Ramos RN  10/19/22 9799

## 2022-10-20 PROBLEM — J84.10 PULMONARY FIBROSIS (HCC): Status: ACTIVE | Noted: 2022-10-20

## 2022-10-20 PROBLEM — R19.7 DIARRHEA: Status: ACTIVE | Noted: 2022-10-20

## 2022-10-20 LAB
ANION GAP SERPL CALCULATED.3IONS-SCNC: 13 MMOL/L (ref 3–16)
BASOPHILS ABSOLUTE: 0 K/UL (ref 0–0.2)
BASOPHILS RELATIVE PERCENT: 0.6 %
BUN BLDV-MCNC: 9 MG/DL (ref 7–20)
CALCIUM SERPL-MCNC: 8.3 MG/DL (ref 8.3–10.6)
CHLORIDE BLD-SCNC: 101 MMOL/L (ref 99–110)
CO2: 26 MMOL/L (ref 21–32)
CREAT SERPL-MCNC: <0.5 MG/DL (ref 0.6–1.2)
EOSINOPHILS ABSOLUTE: 0 K/UL (ref 0–0.6)
EOSINOPHILS RELATIVE PERCENT: 0.1 %
GFR SERPL CREATININE-BSD FRML MDRD: >60 ML/MIN/{1.73_M2}
GI BACTERIAL PATHOGENS BY PCR: NORMAL
GLUCOSE BLD-MCNC: 100 MG/DL (ref 70–99)
GLUCOSE BLD-MCNC: 106 MG/DL (ref 70–99)
GLUCOSE BLD-MCNC: 109 MG/DL (ref 70–99)
GLUCOSE BLD-MCNC: 128 MG/DL (ref 70–99)
GLUCOSE BLD-MCNC: 148 MG/DL (ref 70–99)
GLUCOSE BLD-MCNC: 160 MG/DL (ref 70–99)
HCT VFR BLD CALC: 31.1 % (ref 36–48)
HEMOGLOBIN: 9.9 G/DL (ref 12–16)
LYMPHOCYTES ABSOLUTE: 0.9 K/UL (ref 1–5.1)
LYMPHOCYTES RELATIVE PERCENT: 11.2 %
MAGNESIUM: 1.8 MG/DL (ref 1.8–2.4)
MCH RBC QN AUTO: 25.3 PG (ref 26–34)
MCHC RBC AUTO-ENTMCNC: 31.9 G/DL (ref 31–36)
MCV RBC AUTO: 79.2 FL (ref 80–100)
MONOCYTES ABSOLUTE: 0.1 K/UL (ref 0–1.3)
MONOCYTES RELATIVE PERCENT: 1.1 %
NEUTROPHILS ABSOLUTE: 7 K/UL (ref 1.7–7.7)
NEUTROPHILS RELATIVE PERCENT: 87 %
PDW BLD-RTO: 15.8 % (ref 12.4–15.4)
PERFORMED ON: ABNORMAL
PLATELET # BLD: 255 K/UL (ref 135–450)
PMV BLD AUTO: 7.2 FL (ref 5–10.5)
POTASSIUM REFLEX MAGNESIUM: 3.5 MMOL/L (ref 3.5–5.1)
RBC # BLD: 3.93 M/UL (ref 4–5.2)
SODIUM BLD-SCNC: 140 MMOL/L (ref 136–145)
WBC # BLD: 8 K/UL (ref 4–11)

## 2022-10-20 PROCEDURE — 6370000000 HC RX 637 (ALT 250 FOR IP): Performed by: INTERNAL MEDICINE

## 2022-10-20 PROCEDURE — 2700000000 HC OXYGEN THERAPY PER DAY

## 2022-10-20 PROCEDURE — 6360000002 HC RX W HCPCS: Performed by: INTERNAL MEDICINE

## 2022-10-20 PROCEDURE — 80048 BASIC METABOLIC PNL TOTAL CA: CPT

## 2022-10-20 PROCEDURE — 36415 COLL VENOUS BLD VENIPUNCTURE: CPT

## 2022-10-20 PROCEDURE — 83735 ASSAY OF MAGNESIUM: CPT

## 2022-10-20 PROCEDURE — 2580000003 HC RX 258: Performed by: INTERNAL MEDICINE

## 2022-10-20 PROCEDURE — 94640 AIRWAY INHALATION TREATMENT: CPT

## 2022-10-20 PROCEDURE — 85025 COMPLETE CBC W/AUTO DIFF WBC: CPT

## 2022-10-20 PROCEDURE — 94761 N-INVAS EAR/PLS OXIMETRY MLT: CPT

## 2022-10-20 PROCEDURE — 99222 1ST HOSP IP/OBS MODERATE 55: CPT | Performed by: INTERNAL MEDICINE

## 2022-10-20 PROCEDURE — 2060000000 HC ICU INTERMEDIATE R&B

## 2022-10-20 PROCEDURE — 99232 SBSQ HOSP IP/OBS MODERATE 35: CPT | Performed by: INTERNAL MEDICINE

## 2022-10-20 RX ORDER — IPRATROPIUM BROMIDE AND ALBUTEROL SULFATE 2.5; .5 MG/3ML; MG/3ML
1 SOLUTION RESPIRATORY (INHALATION) 2 TIMES DAILY
Status: DISCONTINUED | OUTPATIENT
Start: 2022-10-20 | End: 2022-10-22 | Stop reason: HOSPADM

## 2022-10-20 RX ORDER — NADOLOL 40 MG/1
20 TABLET ORAL DAILY
Status: DISCONTINUED | OUTPATIENT
Start: 2022-10-20 | End: 2022-10-21

## 2022-10-20 RX ORDER — IPRATROPIUM BROMIDE AND ALBUTEROL SULFATE 2.5; .5 MG/3ML; MG/3ML
1 SOLUTION RESPIRATORY (INHALATION) EVERY 4 HOURS PRN
Status: DISCONTINUED | OUTPATIENT
Start: 2022-10-20 | End: 2022-10-22 | Stop reason: HOSPADM

## 2022-10-20 RX ORDER — SERTRALINE HYDROCHLORIDE 100 MG/1
200 TABLET, FILM COATED ORAL DAILY
Status: DISCONTINUED | OUTPATIENT
Start: 2022-10-20 | End: 2022-10-22 | Stop reason: HOSPADM

## 2022-10-20 RX ORDER — POTASSIUM CHLORIDE 20 MEQ/1
40 TABLET, EXTENDED RELEASE ORAL PRN
Status: DISCONTINUED | OUTPATIENT
Start: 2022-10-20 | End: 2022-10-22 | Stop reason: HOSPADM

## 2022-10-20 RX ORDER — BENZONATATE 100 MG/1
200 CAPSULE ORAL 3 TIMES DAILY PRN
Status: DISCONTINUED | OUTPATIENT
Start: 2022-10-20 | End: 2022-10-22 | Stop reason: HOSPADM

## 2022-10-20 RX ORDER — MAGNESIUM SULFATE IN WATER 40 MG/ML
2000 INJECTION, SOLUTION INTRAVENOUS PRN
Status: DISCONTINUED | OUTPATIENT
Start: 2022-10-20 | End: 2022-10-22 | Stop reason: HOSPADM

## 2022-10-20 RX ORDER — DIPHENOXYLATE HYDROCHLORIDE AND ATROPINE SULFATE 2.5; .025 MG/1; MG/1
1 TABLET ORAL 4 TIMES DAILY PRN
Status: DISCONTINUED | OUTPATIENT
Start: 2022-10-20 | End: 2022-10-22 | Stop reason: HOSPADM

## 2022-10-20 RX ORDER — HYDROXYZINE HYDROCHLORIDE 10 MG/1
10 TABLET, FILM COATED ORAL EVERY 8 HOURS PRN
Status: DISCONTINUED | OUTPATIENT
Start: 2022-10-20 | End: 2022-10-22 | Stop reason: HOSPADM

## 2022-10-20 RX ORDER — POTASSIUM CHLORIDE 7.45 MG/ML
10 INJECTION INTRAVENOUS PRN
Status: DISCONTINUED | OUTPATIENT
Start: 2022-10-20 | End: 2022-10-22 | Stop reason: HOSPADM

## 2022-10-20 RX ORDER — TRAMADOL HYDROCHLORIDE 50 MG/1
50 TABLET ORAL EVERY 6 HOURS PRN
Status: ON HOLD | COMMUNITY

## 2022-10-20 RX ORDER — ENOXAPARIN SODIUM 100 MG/ML
30 INJECTION SUBCUTANEOUS DAILY
Status: DISCONTINUED | OUTPATIENT
Start: 2022-10-20 | End: 2022-10-22 | Stop reason: HOSPADM

## 2022-10-20 RX ORDER — LEVOTHYROXINE SODIUM 0.12 MG/1
125 TABLET ORAL DAILY
Status: DISCONTINUED | OUTPATIENT
Start: 2022-10-20 | End: 2022-10-22 | Stop reason: HOSPADM

## 2022-10-20 RX ORDER — LORAZEPAM 0.5 MG/1
0.5 TABLET ORAL EVERY 6 HOURS PRN
Status: ON HOLD | COMMUNITY

## 2022-10-20 RX ORDER — PIRFENIDONE 267 MG/1
3 TABLET, FILM COATED ORAL 3 TIMES DAILY
Status: DISCONTINUED | OUTPATIENT
Start: 2022-10-20 | End: 2022-10-20 | Stop reason: SDUPTHER

## 2022-10-20 RX ORDER — MIRTAZAPINE 30 MG/1
30 TABLET, FILM COATED ORAL NIGHTLY
Status: DISCONTINUED | OUTPATIENT
Start: 2022-10-20 | End: 2022-10-22 | Stop reason: HOSPADM

## 2022-10-20 RX ORDER — PIRFENIDONE 267 MG/1
801 TABLET, FILM COATED ORAL 3 TIMES DAILY
Status: DISCONTINUED | OUTPATIENT
Start: 2022-10-20 | End: 2022-10-22 | Stop reason: HOSPADM

## 2022-10-20 RX ORDER — POLYETHYLENE GLYCOL 3350 17 G/17G
17 POWDER, FOR SOLUTION ORAL DAILY PRN
Status: DISCONTINUED | OUTPATIENT
Start: 2022-10-20 | End: 2022-10-22 | Stop reason: HOSPADM

## 2022-10-20 RX ADMIN — SODIUM CHLORIDE, PRESERVATIVE FREE 10 ML: 5 INJECTION INTRAVENOUS at 08:24

## 2022-10-20 RX ADMIN — ONDANSETRON HYDROCHLORIDE 4 MG: 2 INJECTION, SOLUTION INTRAMUSCULAR; INTRAVENOUS at 19:54

## 2022-10-20 RX ADMIN — PANTOPRAZOLE SODIUM 40 MG: 40 TABLET, DELAYED RELEASE ORAL at 06:27

## 2022-10-20 RX ADMIN — MIRTAZAPINE 30 MG: 30 TABLET, FILM COATED ORAL at 19:51

## 2022-10-20 RX ADMIN — SERTRALINE 200 MG: 100 TABLET, FILM COATED ORAL at 08:20

## 2022-10-20 RX ADMIN — ENOXAPARIN SODIUM 30 MG: 100 INJECTION SUBCUTANEOUS at 08:21

## 2022-10-20 RX ADMIN — MIRTAZAPINE 30 MG: 30 TABLET, FILM COATED ORAL at 01:51

## 2022-10-20 RX ADMIN — SODIUM CHLORIDE, PRESERVATIVE FREE 10 ML: 5 INJECTION INTRAVENOUS at 01:48

## 2022-10-20 RX ADMIN — BENZONATATE 200 MG: 100 CAPSULE ORAL at 23:58

## 2022-10-20 RX ADMIN — NADOLOL 20 MG: 40 TABLET ORAL at 08:19

## 2022-10-20 RX ADMIN — IPRATROPIUM BROMIDE AND ALBUTEROL SULFATE 1 AMPULE: 2.5; .5 SOLUTION RESPIRATORY (INHALATION) at 19:04

## 2022-10-20 RX ADMIN — ONDANSETRON HYDROCHLORIDE 4 MG: 2 INJECTION, SOLUTION INTRAMUSCULAR; INTRAVENOUS at 13:34

## 2022-10-20 RX ADMIN — MUPIROCIN: 20 OINTMENT TOPICAL at 08:21

## 2022-10-20 RX ADMIN — IPRATROPIUM BROMIDE AND ALBUTEROL SULFATE 1 AMPULE: 2.5; .5 SOLUTION RESPIRATORY (INHALATION) at 08:11

## 2022-10-20 RX ADMIN — LEVOTHYROXINE SODIUM 125 MCG: 125 TABLET ORAL at 06:26

## 2022-10-20 RX ADMIN — POTASSIUM CHLORIDE 40 MEQ: 750 TABLET, EXTENDED RELEASE ORAL at 06:31

## 2022-10-20 RX ADMIN — SODIUM CHLORIDE, PRESERVATIVE FREE 10 ML: 5 INJECTION INTRAVENOUS at 19:52

## 2022-10-20 RX ADMIN — ATORVASTATIN CALCIUM 40 MG: 40 TABLET, FILM COATED ORAL at 08:21

## 2022-10-20 NOTE — PROGRESS NOTES
IM Progress Note    Admit Date:  10/19/2022  1    Interval history:  admitted for diarrhea and hypoxia  Known h x of ILD with 4 L at baseline , was on 10 L on admission but quickly improved     Subjective:  Ms. Obando seen up in bed, feels weak and tired, had 3 loose BM this am  No fevers. No n.v  No bleeding or abd pain   Continues to lose weight     Objective:   BP (!) 144/94   Pulse (!) 103   Temp 98.2 °F (36.8 °C) (Oral)   Resp 27   Ht 5' 3\" (1.6 m)   Wt 109 lb 14.4 oz (49.9 kg)   SpO2 100%   BMI 19.47 kg/m²     Intake/Output Summary (Last 24 hours) at 10/20/2022 0702  Last data filed at 10/20/2022 4185  Gross per 24 hour   Intake 480 ml   Output --   Net 480 ml       Physical Exam:  General:  thin elderly female chronically ill appearing  Awake, alert and oriented. Appears to be not in any distress  Mucous Membranes:  Pink , anicteric  Neck: No JVD, no carotid bruit, no thyromegaly  Chest:  diminished in bases with scattered crackles ,  Cardiovascular:  RRR S1S2 heard, no murmurs or gallops  Abdomen:  Soft, undistended, non tender, no organomegaly, BS present  Extremities: No edema or cyanosis.  Distal pulses well felt  Neurological : no focal deficits    Medications:   Scheduled Medications:    sertraline  200 mg Oral Daily    Pirfenidone  3 tablet Oral TID    nadolol  20 mg Oral Daily    mirtazapine  30 mg Oral Nightly    levothyroxine  125 mcg Oral Daily    ipratropium-albuterol  1 ampule Inhalation BID    pantoprazole  40 mg Oral QAM AC    atorvastatin  40 mg Oral Daily    sodium chloride flush  5-40 mL IntraVENous 2 times per day    enoxaparin  40 mg SubCUTAneous Daily    insulin lispro  0-8 Units SubCUTAneous TID WC    insulin lispro  0-4 Units SubCUTAneous Nightly     I   dextrose      sodium chloride       hydrOXYzine HCl, benzonatate, polyethylene glycol, potassium chloride **OR** potassium alternative oral replacement **OR** potassium chloride, magnesium sulfate, diphenoxylate-atropine, ipratropium-albuterol, glucose, dextrose bolus **OR** dextrose bolus, glucagon (rDNA), dextrose, sodium chloride flush, sodium chloride, ondansetron **OR** ondansetron, acetaminophen **OR** acetaminophen    Lab Data:  Recent Labs     10/19/22  1621 10/20/22  0447   WBC 8.6 8.0   HGB 10.4* 9.9*   HCT 33.9* 31.1*   MCV 80.7 79.2*    255     Recent Labs     10/19/22  1621 10/20/22  0447    140   K 4.2 3.5    101   CO2 32 26   BUN 10 9   CREATININE <0.5* <0.5*     Recent Labs     10/19/22  1621   TROPONINI <0.01       Coagulation:   Lab Results   Component Value Date/Time    INR 1.21 09/15/2022 04:31 AM    APTT 25.7 06/16/2014 12:40 PM     Cardiac markers:   Lab Results   Component Value Date/Time    CKTOTAL 23 07/10/2019 04:11 PM    TROPONINI <0.01 10/19/2022 04:21 PM         Lab Results   Component Value Date    ALT 13 10/19/2022    AST 33 10/19/2022    ALKPHOS 136 (H) 10/19/2022    BILITOT 0.5 10/19/2022       Lab Results   Component Value Date    INR 1.21 (H) 09/15/2022    INR 1.17 (H) 07/10/2019    INR 1.38 (H) 06/27/2019    PROTIME 15.2 (H) 09/15/2022    PROTIME 13.3 (H) 07/10/2019    PROTIME 15.7 (H) 06/27/2019       Radiolog    Radiology:   XR CHEST PORTABLE   Final Result   1. Severe radiographic findings typical of pulmonary fibrosis. Correlate   with clinical history. 2. No superimposed pulmonary infiltrate evident radiographically. 3. Calcific atherosclerosis aorta. 4. Cardiomegaly. CT ABDOMEN PELVIS W IV CONTRAST Additional Contrast? None   Final Result   No acute abnormality within the abdomen/pelvis. Stable fibrotic changes at   the lung bases. Ct chest   . No evidence of pulmonary embolism. 2. Again seen extensive interstitial pulmonary fibrosis. An underlying   superimposed acute pulmonary process is not readily appreciated but difficult   to exclude.      ASSESSMENT and PLAN    Diarrhea - likely related to her antifibrotic meds - Pirfenidone ,hold this    C. difficile ruled out, will start Lomotil for symptoms  Monitor electrolytes and replace as needed  No recent abx use per pt   Ct abd neg     Acute on chronic respiratory failure with hypoxia  Interstitial lung disease  - was needing 10 L on admission but no clear reason and quickly improved to home level of 4 L.  Ct chest neg for infiltrates or PE  - resume home inhalers, Firenidone - may be at lower dose     Paroxysmal atrial fibrillation- remains in NSR     Chronic diastolic CHF  last echo as above, from 5/6/22 EF 55%  -BNP mildly elevated  - pt was taken of diuretics recently for weight loss   - monitor for fluid overload and resume lasix as needed only        #DM type 2  -hHas been taken off diuretics and diabetic meds for progressive weight loss   - given steroids in ER   -SSI  -continue to monitor BG     #HTN  -BP stable  -on nadolol     #HLD  -continue statin     #Hypothyroidism  -continue synthroid     #Iron Deficiency Anemia     Recent Hemorrhoidal bleed with colonoscopy neg  - hb at 10  -continue PO iron  -Hgb stable     #Depression  #Anxiety  -continue zoloft  -prn ativan     Diet: diabetic   GI/DVT PX: /lovenox  CODE STATUS: full code      Marlon Denise MD, 10/20/2022 7:02 AM

## 2022-10-20 NOTE — PROGRESS NOTES
4 Eyes Skin Assessment     The patient is being assess for   Admission    I agree that 2 RN's have performed a thorough Head to Toe Skin Assessment on the patient. ALL assessment sites listed below have been assessed. Areas assessed by both nurses:   [x]   Head, Face, and Ears   [x]   Shoulders, Back, and Chest, Abdomen  [x]   Arms, Elbows, and Hands   [x]   Coccyx, Sacrum, and Ischium  [x]   Legs, Feet, and Heels        NO SKIN ISSUES NOTED. Co-signer eSignature: Electronically signed by Demaris Severin, RN on 10/20/22 at 2:27 AM EDT    Does the Patient have Skin Breakdown?   No          Amos Prevention initiated:  Yes   Wound Care Orders initiated:  No      WOC nurse consulted for Pressure Injury (Stage 3,4, Unstageable, DTI, NWPT, Complex wounds)and New or Established Ostomies:  NA      Primary Nurse eSignature: Electronically signed by Lurene Cushing, RN on 10/20/22 at 2:24 AM EDT

## 2022-10-20 NOTE — ED NOTES
Dr. Posada Lux aware of increased oxygen requirement, increased respiratory rate and new tachycardia.      Abdon Foster RN  10/19/22 2051

## 2022-10-20 NOTE — PROGRESS NOTES
RT Inhaler-Nebulizer Bronchodilator Protocol Note    There is a bronchodilator order in the chart from a provider indicating to follow the RT Bronchodilator Protocol and there is an Initiate RT Inhaler-Nebulizer Bronchodilator Protocol order as well (see protocol at bottom of note). CXR Findings:  XR CHEST PORTABLE    Result Date: 10/19/2022  1. Severe radiographic findings typical of pulmonary fibrosis. Correlate with clinical history. 2. No superimposed pulmonary infiltrate evident radiographically. 3. Calcific atherosclerosis aorta. 4. Cardiomegaly. The findings from the last RT Protocol Assessment were as follows:   History Pulmonary Disease: Chronic pulmonary disease  Respiratory Pattern: Regular pattern and RR 12-20 bpm  Breath Sounds: Slightly diminished and/or crackles  Cough: Strong, spontaneous, non-productive  Indication for Bronchodilator Therapy: Decreased or absent breath sounds  Bronchodilator Assessment Score: 4    Aerosolized bronchodilator medication orders have been revised according to the RT Inhaler-Nebulizer Bronchodilator Protocol below. Respiratory Therapist to perform RT Therapy Protocol Assessment initially then follow the protocol. Repeat RT Therapy Protocol Assessment PRN for score 0-3 or on second treatment, BID, and PRN for scores above 3. No Indications - adjust the frequency to every 6 hours PRN wheezing or bronchospasm, if no treatments needed after 48 hours then discontinue using Per Protocol order mode. If indication present, adjust the RT bronchodilator orders based on the Bronchodilator Assessment Score as indicated below.   Use Inhaler orders unless patient has one or more of the following: on home nebulizer, not able to hold breath for 10 seconds, is not alert and oriented, cannot activate and use MDI correctly, or respiratory rate 25 breaths per minute or more, then use the equivalent nebulizer order(s) with same Frequency and PRN reasons based on the score.  If a patient is on this medication at home then do not decrease Frequency below that used at home. 0-3 - enter or revise RT bronchodilator order(s) to equivalent RT Bronchodilator order with Frequency of every 4 hours PRN for wheezing or increased work of breathing using Per Protocol order mode. 4-6 - enter or revise RT Bronchodilator order(s) to two equivalent RT bronchodilator orders with one order with BID Frequency and one order with Frequency of every 4 hours PRN wheezing or increased work of breathing using Per Protocol order mode. 7-10 - enter or revise RT Bronchodilator order(s) to two equivalent RT bronchodilator orders with one order with TID Frequency and one order with Frequency of every 4 hours PRN wheezing or increased work of breathing using Per Protocol order mode. 11-13 - enter or revise RT Bronchodilator order(s) to one equivalent RT bronchodilator order with QID Frequency and an Albuterol order with Frequency of every 4 hours PRN wheezing or increased work of breathing using Per Protocol order mode. Greater than 13 - enter or revise RT Bronchodilator order(s) to one equivalent RT bronchodilator order with every 4 hours Frequency and an Albuterol order with Frequency of every 2 hours PRN wheezing or increased work of breathing using Per Protocol order mode.        Electronically signed by Yas Robles RCP on 10/20/2022 at 7:10 PM

## 2022-10-20 NOTE — ED NOTES
Writer entered the room after hearing pt coughing. When entered the room pt. Was 60% SPO2 and appeared to be in distress. Pt. Placed on NRB at 15L and called MD to bedside. MD requests pt. Place on High flow nasal cannula and 20 of IV lasix.         Muna Velazquez RN  10/19/22 [id number]

## 2022-10-20 NOTE — ACP (ADVANCE CARE PLANNING)
Advance Care Planning     General Advance Care Planning (ACP) Conversation    Date of Conversation: 10/19/2022  Conducted with: Patient with Decision Making Capacity    Healthcare Decision Maker:    Primary Decision Maker: Ryan Hirsch - Spouse - [phone]    Secondary Decision Maker: Fabiola Parra - Child - [phone]  Click here to complete Healthcare Decision Makers including selection of the Healthcare Decision Maker Relationship (ie \"Primary\"). Today we documented Decision Maker(s) consistent with ACP documents on file.  See ACP documents under media tab on 07/16/2019    Content/Action Overview:    Reviewed DNR/DNI and patient elects Full Code (Attempt Resuscitation)  ventilation preferences  Pt states she would want a vent if medically necessary     Length of Voluntary ACP Conversation in minutes:  <16 minutes (Non-Billable)    Wheeler Goldmann MSW, JAMIS

## 2022-10-20 NOTE — H&P
Hospital Medicine History & Physical      PCP: Tessy Barros MD    Date of Admission: 10/19/2022    Date of Service: Pt seen/examined on 10/19/2022    Chief Complaint: Diarrhea    History Of Present Illness:    69 y.o. female who presented to the hospital with a chief complaint of diarrhea. Patient has a history of multiple health issues including essential lung disease on chronic supplemental oxygen therapy. She presents with a 2 to 3-day history of progressive abdominal discomfort and diarrhea. She endorses multiple loose bowel movements every day and increased fatigue. She states her breathing is at her baseline and she requires 3 to 4 L of oxygen at all times. In emergency department during evaluation she desaturated requiring high flow nasal cannula. Given her worsening hypoxia she will be admitted for observation. Past Medical History:          Diagnosis Date    A-fib ([address]     Anxiety     Cryptogenic organizing pneumonia ([address]     Depression     GERD (gastroesophageal reflux disease)     Hyperlipidemia     On home oxygen therapy     uses O2 3L prn    Rash     Thyroid disease     hypothyroidism       Past Surgical History:          Procedure Laterality Date    ARM SURGERY Left 3/2/2020    LEFT ULNAR NERVE DECOMPRESSION AT THE ELBOW performed by Delvin Solano MD at [facility] N/A 6/27/2019    EBUS WF W/ANES.  performed by Anthony Galindo MD at [facility]  6/27/2019    BRONCHOSCOPY/TRANSBRONCHIAL NEEDLE BIOPSY performed by Anthony Galindo MD at [facility]  6/27/2019    BRONCHOSCOPY/TRANSBRONCHIAL LUNG BIOPSY performed by Anthony Galindo MD at [facility]  6/27/2019    BRONCHOSCOPY ALVEOLAR LAVAGE performed by Anthony Galindo MD at [facility]  07/10/2019    BRONCHOSCOPY N/A 7/10/2019    BRONCHOSCOPY ALVEOLAR LAVAGE performed by Ariella Abraham MD at [facility] Bilateral 08/06/2019    BRONCHOSCOPY N/A 8/6/2019    BRONCHOSCOPY ALVEOLAR LAVAGE performed by Jarod Dalal MD at [place] [address] N/A 12/24/2019    BRONCHOSCOPY ALVEOLAR LAVAGE performed by Jannet Henderson MD at [address] [place] [address] N/A 8/25/2022    COLONOSCOPY POLYPECTOMY SNARE/COLD BIOPSY performed by Reyes Candy, MD at [facility]  9/15/2022    CT GUIDED CHEST TUBE 9/15/2022 [facility] CT SCAN    HYSTERECTOMY, TOTAL ABDOMINAL (CERVIX REMOVED)      partial       Medications Prior to Admission:      Prior to Admission medications    Medication Sig Start Date End Date Taking?  Authorizing Provider   omeprazole (PRILOSEC) 40 MG delayed release capsule Take 1 capsule by mouth every morning (before breakfast) 10/3/22   David Mcintosh MD   magnesium oxide (MAG-OX) 400 (240 Mg) MG tablet Take 1 tablet by mouth daily 9/2/22   Na Hsieh MD   potassium chloride (KLOR-CON) 10 MEQ extended release tablet Take 1 tablet by mouth 2 times daily 9/1/22   Na Hsieh MD   polycarbophil [facility]) 625 MG tablet Take 1 tablet by mouth at bedtime 9/1/22   Na Hsieh MD   Ergocalciferol (VITAMIN D) 03716 units CAPS Take 50,000 Units by mouth once a week 9/4/22   Na Hsieh MD   Pirfenidone 267 MG TABS Take 3 tablets by mouth in the morning, at noon, and at bedtime 8/26/22   Andry Higuera MD   albuterol sulfate HFA (PROVENTIL;VENTOLIN;PROAIR) 108 (90 Base) MCG/ACT inhaler INHALE 2 PUFFS INTO THE LUNGS EVERY 6 HOURS AS NEEDED FOR WHEEZING 7/13/22   Ani Estrella MD   atorvastatin (LIPITOR) 40 MG tablet Take 1 tablet by mouth daily 5/27/22   David Mcintosh MD   levothyroxine (SYNTHROID) 125 MCG tablet Take 1 tablet by mouth Daily TAKE 1 TABLET BY MOUTH EVERY DAY 5/27/22   David Mcintosh MD   sertraline (ZOLOFT) 100 MG tablet Take 2 tablets by mouth daily 5/27/22   David Mcintosh MD   mirtazapine (REMERON) 30 MG tablet Take 1 tablet by mouth nightly 5/27/22   Beth Parker MD   nadolol (CORGARD) 20 MG tablet Take 1 tablet by mouth daily 5/27/22   Beth Parker MD   loratadine-pseudoephedrine ([employer] ALLERGY RELIEF-D12) 5-120 MG per extended release tablet TAKE 1 TABLET BY MOUTH TWICE A DAY 5/27/22   Beth Parker MD   benzonatate (TESSALON) 200 MG capsule TAKE 1 CAPSULE BY MOUTH 3 TIMES A DAY AS NEEDED FOR COUGH 5/27/22   Beth Parker MD   empagliflozin (JARDIANCE) 25 MG tablet Take 1 tablet by mouth daily 5/27/22 9/1/22  Beth Parker MD   SITagliptin (JANUVIA) 100 MG tablet TAKE 1 TABLET BY MOUTH EVERY DAY 5/27/22 9/1/22  Beth Parker MD   hydrOXYzine (ATARAX) 10 MG tablet Take 1 tablet by mouth every 8 hours as needed for Itching TAKE 1 TO 3 TABLETS BY MOUTH 3 TIMES DAILY AS NEEDED FOR ITCHING 5/8/22   PANDA Malagon CNP   furosemide (LASIX) 40 MG tablet Take 1 tablet by mouth daily 5/9/22 9/1/22  PANDA Malagon CNP   acetaminophen (TYLENOL) 325 MG tablet  4/21/22   Historical Provider, MD   guaiFENesin (MUCINEX) 600 MG extended release tablet Take 1,200 mg by mouth every morning    Historical Provider, MD   albuterol (PROVENTIL) (2.5 MG/3ML) 0.083% nebulizer solution INHALE THE CONTENTS OF 1 VIAL VIA NEBULIZER EVERY 6 HOURS AS NEEDED FOR WHEEZING 2/7/22   Beth Parker MD   Blood Glucose Monitoring Suppl (TRUE METRIX METER) PUJA Test daily and as needed 9/10/21   Beth Parker MD   blood glucose test strips (ASCENSIA AUTODISC VI;ONE TOUCH ULTRA TEST VI) strip 1 each by In Vitro route daily As needed.  9/10/21   Beth Parker MD   TRUEplus Lancets 33G MISC Use daily 9/10/21   Beth Parker MD   Blood Glucose Calibration (TRUE METRIX LEVEL 1) Low SOLN use as needed 9/9/21   Beth Parker MD   INSULIN SYRINGE 1CC/29G 29G X 1/2\" 1 ML MISC 1 each by Does not apply route 2 times daily 3/6/20   Beth Parker MD   OXYGEN Inhale 3.5 L into the lungs 7/14/19   Historical Provider, MD Allergies:  Penicillins, Cefuroxime, Doxycycline, Imitrex [sumatriptan], Meperidine, Other, Sulfa antibiotics, Keflex [cephalexin], and Tape [adhesive tape]    Social History:      TOBACCO:   reports that she has never smoked. She has never used smokeless tobacco.  ETOH:   reports no history of alcohol use. Family History:          Problem Relation Age of Onset    Diabetes Mother     High Blood Pressure Mother     Asthma Sister     Other Father        REVIEW OF SYSTEMS:   Constitutional:  Negative for fever,chills or night sweats  ENT:  Negative for rhinorrhea, epistaxis, hoarseness, sore throat. Respiratory: Negative for SOB or wheezing   Cardiovascular:   Negative for  chest pain, palpitations   Gastrointestinal: Positive for diarrhea  Genitourinary:  Negative for polyuria, dysuria   Hematologic/Lymphatic:  Negative for  bleeding tendency, easy bruising  Musculoskeletal:  Negative for myalgias and arthralgias  Neurologic:  Negative for  confusion,dysarthria. Skin:  Negative for itching,rash  Psychiatric:  Negative for depression,anxiety, agitation. Endocrine:  Negative for polydipsia,polyuria,heat /cold intolerance. PHYSICAL EXAM:    BP (!) 142/82   Pulse (!) 101   Temp 98.2 °F (36.8 °C)   Resp 20   SpO2 100%   General appearance: Ill-appearing female in no obvious distress  HEENT:  Normal cephalic, atraumatic without obvious deformity. Pupils equal, round, and reactive to light. Extra ocular muscles intact. Conjunctivae/corneas clear. Neck: Supple, with full range of motion. No jugular venous distention. Trachea midline. Respiratory: Positive for bibasilar crackles  Cardiovascular:  Regular rate and rhythm with normal S1/S2 without murmurs, rubs or gallops. Abdomen: Soft, non-tender, non-distended with normal bowel sounds. Musculoskeletal:  No clubbing, cyanosis or edema bilaterally. Full range of motion without deformity.   Skin: Skin color, texture, turgor normal.  No rashes or lesions. Neurologic:  Neurovascularly intact without any focal sensory/motor deficits. Cranial nerves: II-XII intact, grossly non-focal.  Psychiatric:  Alert and oriented, thought content appropriate, normal insight  Capillary Refill: Brisk,< 3 seconds   Peripheral Pulses: +2 palpable, equal bilaterally       Labs:   Recent Labs     10/19/22  1621   WBC 8.6   HGB 10.4*   HCT 33.9*        Recent Labs     10/19/22  1621      K 4.2      CO2 32   BUN 10   CREATININE <0.5*   CALCIUM 10.2     Recent Labs     10/19/22  1621   AST 33   ALT 13   BILITOT 0.5   ALKPHOS 136*     No results for input(s): INR in the last 72 hours. Recent Labs     10/19/22  1621   TROPONINI <0.01       Urinalysis:      Lab Results   Component Value Date/Time    NITRU Negative 10/19/2022 08:30 PM    WBCUA None seen 10/19/2022 08:30 PM    BACTERIA Rare 05/28/2022 06:39 AM    RBCUA None seen 10/19/2022 08:30 PM    BLOODU Negative 10/19/2022 08:30 PM    SPECGRAV 1.010 10/19/2022 08:30 PM    GLUCOSEU Negative 10/19/2022 08:30 PM       Radiology:   XR CHEST PORTABLE   Final Result   1. Severe radiographic findings typical of pulmonary fibrosis. Correlate   with clinical history. 2. No superimposed pulmonary infiltrate evident radiographically. 3. Calcific atherosclerosis aorta. 4. Cardiomegaly. CT ABDOMEN PELVIS W IV CONTRAST Additional Contrast? None   Final Result   No acute abnormality within the abdomen/pelvis. Stable fibrotic changes at   the lung bases.          CT CHEST PULMONARY EMBOLISM W CONTRAST    (Results Pending)       ASSESSMENT:  Gastroenteritis  Acute on chronic respiratory failure with hypoxia  Interstitial lung disease  Paroxysmal atrial fibrillation  Diastolic heart failure  Diabetes mellitus type 2  Hypertension  Hypothyroidism  Iron deficiency anemia  Generalized anxiety disorder    PLAN:  C. difficile ruled out, will start Lomotil for symptoms  - Continue supplemental oxygen therapy, was able to turn down oxygen at bedside with her sats being maintained at almost 100%. We will continue to wean. Breathing treatments as needed. Continue home ILD medications. CTA does not show any evidence of a pneumonitis, chronic changes consistent with interstitial lung disease.  - Resume rest of home medications including Zoloft and nadolol  - No evidence of an acute infection, if stable in the a.m. can discharge home. DVT Prophylaxis: Lovenox  Diet: No diet orders on file  Code Status: Prior    Dispo -admit to [facility] on telemetry      Thank you for the opportunity to be involved in this patient's care.       (Please note that portions of this note were completed with a voice recognition program. Efforts were made to edit the dictations but occasionally words are mis-transcribed.)

## 2022-10-20 NOTE — PROGRESS NOTES
Patient admitted to room 322 from [facility] 3 carb meal. Patient oriented to room, call light, bed rails, phone, lights and bathroom. Patient instructed about the schedule of the day including: vital sign frequency, lab draws, possible tests, frequency of MD and staff rounds, daily weights, I &O's and prescribed diet. Telemetry box in place, patient aware of placement and reason. Bed locked, in lowest position, side rails up 2/4, call light within reach. 4 Eyes Skin Assessment     The patient is being assess for   Transfer to New Unit    I agree that 2 RN's have performed a thorough Head to Toe Skin Assessment on the patient. ALL assessment sites listed below have been assessed. Areas assessed for pressure by both nurses:   []   Head, Face, and Ears   []   Shoulders, Back, and Chest, Abdomen  []   Arms, Elbows, and Hands   [x]   Coccyx, Sacrum, and Ischium  [x]   Legs, Feet, and Heels        Skin Assessed Under all Medical Devices by both nurses:  O2 device tubing              All Mepilex Borders were peeled back and area peeked at by both nurses:  NA  Please list where Mepilex Borders are located: NA             **SHARE this note so that the co-signing nurse is able to place an eSignature**    Co-signer eSignature: Electronically signed by Eveline Kraus RN on 10/20/22 at 6:29 PM EDT    Does the Patient have Skin Breakdown related to pressure?   No            Amos Prevention initiated:  No   Wound Care Orders initiated:  No      [facility] nurse consulted for Pressure Injury (Stage 3,4, Unstageable, DTI, NWPT, Complex wounds)and New or Established Ostomies:  No      Primary Nurse eSignature: Electronically signed by Gadiel Fabian RN on 10/20/22 at 6:28 PM EDT

## 2022-10-20 NOTE — PROGRESS NOTES
Handoff given to [address]. Switched to PCU monitor and transferred to . Daughter Arnold Louise called and updated on rm change. No longer following.

## 2022-10-20 NOTE — ED NOTES
Chief Complaint   Patient presents with    Diarrhea     Patient comes in due to diarrhea and upset stomach x2 days.         /      Vitals:    10/19/22 2027 10/19/22 2047 10/19/22 2132 10/19/22 2135   BP:  (!) 152/87 (!) 171/130 (!) 142/82   Pulse: (!) 108 (!) 108 (!) 107 (!) 101   Resp: 22 (!) 33 28 20   Temp:       SpO2: 93% 98% 99% 100%          Allergies   Allergen Reactions    Penicillins Anaphylaxis     Tolerates Meropenem.  Cefuroxime      Tolerates Meropenem.  Doxycycline Hives    Imitrex [Sumatriptan] Other (See Comments)     Feels like pins and needles    Meperidine     Other Other (See Comments)     Blood pressure drops, light headed    Sulfa Antibiotics Hives    Keflex [Cephalexin] Itching and Rash     Tolerates Meropenem.  Tape [place] Tavares Tape] Rash       No current facility-administered medications for this encounter.      Current Outpatient Medications   Medication Sig Dispense Refill    omeprazole (PRILOSEC) 40 MG delayed release capsule Take 1 capsule by mouth every morning (before breakfast) 90 capsule 1    magnesium oxide (MAG-OX) 400 (240 Mg) MG tablet Take 1 tablet by mouth daily 30 tablet 1    potassium chloride (KLOR-CON) 10 MEQ extended release tablet Take 1 tablet by mouth 2 times daily 60 tablet 1    polycarbophil (FIBERCON) 625 MG tablet Take 1 tablet by mouth at bedtime  4    Ergocalciferol (VITAMIN D) 26090 units CAPS Take 50,000 Units by mouth once a week 5 capsule 1    Pirfenidone 267 MG TABS Take 3 tablets by mouth in the morning, at noon, and at bedtime 270 tablet 3    albuterol sulfate HFA (PROVENTIL;VENTOLIN;PROAIR) 108 (90 Base) MCG/ACT inhaler INHALE 2 PUFFS INTO THE LUNGS EVERY 6 HOURS AS NEEDED FOR WHEEZING 3 each 1    atorvastatin (LIPITOR) 40 MG tablet Take 1 tablet by mouth daily 90 tablet 1    levothyroxine (SYNTHROID) 125 MCG tablet Take 1 tablet by mouth Daily TAKE 1 TABLET BY MOUTH EVERY DAY 90 tablet 1    sertraline (ZOLOFT) 100 MG tablet Take 2 tablets by mouth daily 180 tablet 1    mirtazapine (REMERON) 30 MG tablet Take 1 tablet by mouth nightly 90 tablet 1    nadolol (CORGARD) 20 MG tablet Take 1 tablet by mouth daily 90 tablet 1    loratadine-pseudoephedrine ([employer] ALLERGY RELIEF-D12) 5-120 MG per extended release tablet TAKE 1 TABLET BY MOUTH TWICE A DAY 60 tablet 5    benzonatate (TESSALON) 200 MG capsule TAKE 1 CAPSULE BY MOUTH 3 TIMES A DAY AS NEEDED FOR COUGH 90 capsule 5    hydrOXYzine (ATARAX) 10 MG tablet Take 1 tablet by mouth every 8 hours as needed for Itching TAKE 1 TO 3 TABLETS BY MOUTH 3 TIMES DAILY AS NEEDED FOR ITCHING 90 tablet 1    acetaminophen (TYLENOL) 325 MG tablet       guaiFENesin (MUCINEX) 600 MG extended release tablet Take 1,200 mg by mouth every morning      albuterol (PROVENTIL) (2.5 MG/3ML) 0.083% nebulizer solution INHALE THE CONTENTS OF 1 VIAL VIA NEBULIZER EVERY 6 HOURS AS NEEDED FOR WHEEZING 720 mL 1    Blood Glucose Monitoring Suppl (TRUE METRIX METER) PUJA Test daily and as needed 1 each 0    blood glucose test strips (ASCENSIA AUTODISC VI;ONE TOUCH ULTRA TEST VI) strip 1 each by In Vitro route daily As needed. 100 each 3    TRUEplus Lancets 33G MISC Use daily 100 each 3    Blood Glucose Calibration (TRUE METRIX LEVEL 1) Low SOLN use as needed 1 each 2    INSULIN SYRINGE 1CC/29G 29G X 1/2\" 1 ML MISC 1 each by Does not apply route 2 times daily 100 each 5    OXYGEN Inhale 3.5 L into the lungs        List of medications patient is currently taking is complete. Source of medications in list are family.        Patient Active Problem List   Diagnosis    Chest pain    HTN (hypertension)    Hyperlipidemia with target LDL less than 130    Hypokalemia    Insomnia    Trochanteric bursitis of both hips    Migraine    Syncope    Acute blood loss anemia    Fatigue    Hypothyroidism    Mild single current episode of major depressive disorder (HCC)    Anxiety    Pneumonia of both lower lobes due to infectious organism    A-fib [facility])    Atypical pneumonia    Acute bronchitis with bronchospasm    Acute on chronic diastolic CHF (congestive heart failure) (HCC)    Class 2 obesity with body mass index (BMI) of 39.0 to 39.9 in adult    Abnormal chest CT    Acute diastolic heart failure (HCC)    ILD (interstitial lung disease) (HCC)    Acute on chronic respiratory failure with hypoxia (HCC)    Restrictive lung disease    Abnormal CT of the chest    SOB (shortness of breath)    Acute cystitis without hematuria    Shortness of breath    Acute on chronic respiratory failure with hypoxemia (HCC)    Cryptogenic organizing pneumonia (HCC)    Pneumothorax of left lung after biopsy    COPD (chronic obstructive pulmonary disease) (HCC)    Type 2 diabetes mellitus without complication (HCC)    Hyponatremia    Numbness and tingling in left hand    Ulnar neuropathy at elbow of left upper extremity    Acute congestive heart failure (HCC)    Left wrist pain    Left wrist tendinitis    GERD (gastroesophageal reflux disease)    Toxic metabolic encephalopathy    VIRGINIE (acute kidney injury) (HCC)    Lumbar radiculopathy    Acute respiratory failure with hypoxia (HCC)    Rib pain on left side    Elevated brain natriuretic peptide (BNP) level    Fracture of multiple ribs of left side    Interstitial lung disease (HCC)    Hypomagnesemia    Depression    Chronic diastolic congestive heart failure (HCC)    Burst fracture of lumbar vertebra (HCC)    H/O Spinal surgery    Severe malnutrition (HCC)    Pneumonia due to COVID-19 virus    Primary spontaneous pneumothorax    Secondary spontaneous pneumothorax    COVID-19    Acute on chronic respiratory failure (HCC)                     Vitals:    10/19/22 2027 10/19/22 2047 10/19/22 2132 10/19/22 2135   BP:  (!) 152/87 (!) 171/130 (!) 142/82   Pulse: (!) 108 (!) 108 (!) 107 (!) 101   Resp: 22 (!) 33 28 20   Temp:       SpO2: 93% 98% 99% 100% -----------------------------------------------------------------------------------------    Pt was updated about admission. *To be filled out after report is complete*    Bed assignment:     Report called to * on *. Pertinent labs, allergies, medications and conditions were reviewed. Present Skin issues include none. Patient belongings that will be going with the patient during admission include clothing. Emergency contact * was notified of admission.      Electronically signed by Miko Calabrese RN on 10/19/22 at 11:17 PM EDT       Miko Calabrese RN  10/19/22 2139

## 2022-10-20 NOTE — ED NOTES
PtAmy Noel on bedpan to obtain stool specimen at this time. Collected and sent to lab.       Bianka Hughes RN  10/19/22 [id number]

## 2022-10-20 NOTE — DISCHARGE INSTR - COC
Continuity of Care Form    Patient Name: Darshan Cárdenas   :  1955  MRN:  [id number]    Admit date:  10/19/2022  Discharge date:  10/22/22    Code Status Order: Full Code   Advance Directives:     Admitting Physician:  Saintclair Ferries, MD  PCP: Sebastien Prater MD    Discharging Nurse: [facility] Unit/Room#: 6154/0914-55  Discharging Unit Phone Number: ***    Emergency Contact:   Extended Emergency Contact Information  Primary Emergency Contact: [facility]  Address: [address]           [facility] Angélica  Adine Honeycutt of [address] Phone: [phone]  Mobile Phone: [phone]  Relation: Spouse  Secondary Emergency Contact: [facility]  Address: [address] [facility]           Meet JONES   Home Phone: [phone]  Mobile Phone: [phone]  Relation: Child    Past Surgical History:  Past Surgical History:   Procedure Laterality Date    ARM SURGERY Left 3/2/2020    LEFT ULNAR NERVE DECOMPRESSION AT THE ELBOW performed by Kiki Larios MD at [address] [place] [address] 2019    EBUS WF W/ANES.  performed by Vitaly Burton MD at [place] [address]  2019    BRONCHOSCOPY/TRANSBRONCHIAL NEEDLE BIOPSY performed by Vitaly Burton MD at [place] [address]  2019    BRONCHOSCOPY/TRANSBRONCHIAL LUNG BIOPSY performed by Vitaly Burton MD at [place] [address]  2019    BRONCHOSCOPY ALVEOLAR LAVAGE performed by Vitaly Burton MD at [place] [address]  07/10/2019    BRONCHOSCOPY N/A 7/10/2019    BRONCHOSCOPY ALVEOLAR LAVAGE performed by Garcia Horan MD at [place] [address] Bilateral 2019    BRONCHOSCOPY N/A 2019    BRONCHOSCOPY ALVEOLAR LAVAGE performed by Scarlet Malik MD at [place] [address] N/A 2019    BRONCHOSCOPY ALVEOLAR LAVAGE performed by Erich Sen MD at [place] N/A 2022 COLONOSCOPY POLYPECTOMY SNARE/COLD BIOPSY performed by Layo Motley MD at [address] [place]  9/15/2022    CT GUIDED CHEST TUBE 9/15/2022 [facility] CT SCAN    HYSTERECTOMY, TOTAL ABDOMINAL (CERVIX REMOVED)      partial       Immunization History:   Immunization History   Administered Date(s) Administered    COVID-19, MODERNA BLUE border, Primary or Immunocompromised, (age 12y+), IM, 100 mcg/0.5mL 03/25/2021, 04/22/2021    Influenza Vaccine, unspecified formulation 02/13/2016    Influenza Virus Vaccine 01/15/2016, 02/13/2016    Influenza, FLUAD, (age 72 y+), Adjuvanted, 0.5mL 11/23/2020, 11/17/2021    Influenza, Triv, inactivated, subunit, adjuvanted, IM (Fluad 65 yrs and older) 11/14/2019    Pneumococcal Conjugate 13-valent ([id number]) 06/18/2019    Pneumococcal Polysaccharide ([id number]) 11/14/2013, 04/03/2014    Tdap (Boostrix, Adacel) 02/04/2021       Active Problems:  Patient Active Problem List   Diagnosis Code    Chest pain R07.9    HTN (hypertension) I10    Hyperlipidemia with target LDL less than 130 E78.5    Hypokalemia E87.6    Insomnia G47.00    Trochanteric bursitis of both hips M70.61, M70.62    Migraine G43.909    Syncope R55    Acute blood loss anemia D62    Fatigue R53.83    Hypothyroidism E03.9    Mild single current episode of major depressive disorder ([facility]) F32.0    Anxiety F41.9    Pneumonia of both lower lobes due to infectious organism J18.9    A-fib ([facility]) I48.91    Atypical pneumonia J18.9    Acute bronchitis with bronchospasm J20.9    Acute on chronic diastolic CHF (congestive heart failure) ([facility]) I50.33    Class 2 obesity with body mass index (BMI) of 39.0 to 39.9 in adult E66.9, Z68.39    Abnormal chest CT Z06.24    Acute diastolic heart failure ([facility]) I50.31    ILD (interstitial lung disease) ([facility]) J84.9    Acute on chronic respiratory failure with hypoxia ([facility]) J96.21    Restrictive lung disease J98.4    Abnormal CT of the chest R93.89    SOB (shortness of breath) R06.02    Acute cystitis without hematuria N30.00    Shortness of breath R06.02    Acute on chronic respiratory failure with hypoxemia ([facility]) J96.21    Cryptogenic organizing pneumonia ([facility]) J84.116    Pneumothorax of left lung after biopsy J95.811    COPD (chronic obstructive pulmonary disease) ([facility]) J44.9    Type 2 diabetes mellitus without complication ([facility]) W91.2    Hyponatremia E87.1    Numbness and tingling in left hand R20.0, R20.2    Ulnar neuropathy at elbow of left upper extremity G56.22    Acute congestive heart failure ([facility]) I50.9    Left wrist pain M25.532    Left wrist tendinitis M77.8    GERD (gastroesophageal reflux disease) I40.9    Toxic metabolic encephalopathy E60.7    VIRGINIE (acute kidney injury) ([facility] [address] N17.9    Lumbar radiculopathy M54.16    Acute respiratory failure with hypoxia ([facility]) J96.01    Rib pain on left side R07.81    Elevated brain natriuretic peptide (BNP) level R79.89    Fracture of multiple ribs of left side S22.42XA    Interstitial lung disease ([facility]) J84.9    Hypomagnesemia E83.42    Depression F32. A    Chronic diastolic congestive heart failure ([facility]) I50.32    Burst fracture of lumbar vertebra ([facility] [address] S32.001A    H/O Spinal surgery Z98.890    Severe malnutrition ([facility] [address] E43    Pneumonia due to COVID-19 virus U07.1, J12.82    Primary spontaneous pneumothorax J93.11    Secondary spontaneous pneumothorax J93.12    COVID-19 U07.1    Acute on chronic respiratory failure ([facility]) J96.20       Isolation/Infection:   Isolation            No Isolation          Patient Infection Status       Infection Onset Added Last Indicated Last Indicated By Review Planned Expiration Resolved Resolved By    None active    Resolved    COVID-19 (Rule Out) 10/19/22 10/19/22 10/19/22 COVID-19 & Influenza Combo (Ordered)   10/19/22 Rule-Out Test Resulted    C-diff Rule Out 10/19/22 10/19/22 10/19/22 GI Bacterial Pathogens By PCR (Ordered)   10/19/22 Rule-Out Test Resulted    COVID-19 09/09/22 09/09/22 09/09/22 COVID-19 & Influenza Combo   09/19/22 Nona Ordonez RN    Pt is asymptomatic and 5 days or greater since positive testing, per Dr and PCU staff,     COVID-19 (Rule Out) 09/09/22 09/09/22 09/09/22 COVID-19 & Influenza Combo (Ordered)   09/09/22 Rule-Out Test Resulted    COVID-19 (Rule Out) 07/14/22 07/14/22 07/14/22 COVID-19, Rapid (Ordered)   07/14/22 Rule-Out Test Resulted    COVID-19 (Rule Out) 07/04/22 07/04/22 07/04/22 COVID-19, Rapid (Ordered)   07/04/22 Rule-Out Test Resulted    COVID-19 (Rule Out) 05/06/22 05/06/22 05/06/22 COVID-19, Rapid (Ordered)   05/06/22 Rule-Out Test Resulted    COVID-19 (Rule Out) 04/18/22 04/18/22 04/18/22 COVID-19 & Influenza Combo (Ordered)   04/18/22 Rule-Out Test Resulted    COVID-19 (Rule Out) 01/25/22 01/25/22 01/25/22 COVID-19 & Influenza Combo (Ordered)   01/25/22 Rule-Out Test Resulted    COVID-19 (Rule Out) 01/24/22 01/24/22 01/24/22 COVID-19, Rapid (Ordered)   01/24/22 Rule-Out Test Resulted    COVID-19 (Rule Out) 06/18/21 06/18/21 06/18/21 COVID-19 & Influenza Combo (Ordered)   06/18/21 Rule-Out Test Resulted            Nurse Assessment:  Last Vital Signs: BP (!) 171/97   Pulse (!) 102   Temp 98.4 °F (36.9 °C) (Oral)   Resp 29   Ht 5' 3\" (1.6 m)   Wt 109 lb 14.4 oz (49.9 kg)   SpO2 99%   BMI 19.47 kg/m²     Last documented pain score (0-10 scale):    Last Weight:   Wt Readings from Last 1 Encounters:   10/20/22 109 lb 14.4 oz (49.9 kg)     Mental Status:  {IP PT MENTAL STATUS:20030}    IV Access:  { CAROLANN IV ACCESS:680247038}    Nursing Mobility/ADLs:  Walking   {CHP DME JHQM:937557775}  Transfer  {CHP DME EQXY:818748059}  Bathing  {CHP DME SJSW:547241336}  Dressing  {CHP DME ASWX:440939795}  Toileting  {CHP DME IQER:587070608}  Feeding  {CHP DME SVAH:099172426}  Med Admin  {CHP DME XGWV:116233544}  Med Delivery   { CAROLANN MED Delivery:695250778}    Wound Care Documentation and Therapy:        Elimination:  Continence:    Bowel: {YES / GB:83402}  Bladder: {YES / HP:73340}  Urinary Catheter: {Urinary Catheter:677918249}   Colostomy/Ileostomy/Ileal Conduit: {YES / TH:79380}       Date of Last BM: ***    Intake/Output Summary (Last 24 hours) at 10/20/2022 1113  Last data filed at 10/20/2022 0824  Gross per 24 hour   Intake 720 ml   Output --   Net 720 ml     I/O last 3 completed shifts:   In: 18 [P.O.:480]  Out: -     Safety Concerns:     [address] Safety Concerns:611504800}    Impairments/Disabilities:      [address] Impairments/Disabilities:014522896}    Nutrition Therapy:  Current Nutrition Therapy:   [address] Diet List:201145694}    Routes of Feeding: {CHP DME Other Feedings:262982993}  Liquids: {Slp liquid thickness:20823}  Daily Fluid Restriction: {CHP DME Yes amt example:167781014}  Last Modified Barium Swallow with Video (Video Swallowing Test): {Done Not Done KFKZ:596522697}    Treatments at the Time of Hospital Discharge:   Respiratory Treatments: ***  Oxygen Therapy:  {Therapy; copd oxygen:47566}  Ventilator:    { CC Vent XBRN:983071158}    Rehab Therapies: {THERAPEUTIC INTERVENTION:1026624056}  Weight Bearing Status/Restrictions: [address]  Weight Bearin}  Other Medical Equipment (for information only, NOT a DME order):  {EQUIPMENT:894064661}  Other Treatments: ***    Patient's personal belongings (please select all that are sent with patient):  {[place] DME Belongings:636176395}    RN SIGNATURE:  {Esignature:230076003}    CASE MANAGEMENT/SOCIAL WORK SECTION    Inpatient Status Date: 10/19/2022  Readmission Risk Assessment Score:  Readmission Risk              Risk of Unplanned Readmission:  37           Discharging to Facility/ Agency   Name: [facility]   Address:  Phone: [phone]      / signature: Electronically signed by Mckenna Connors RN on 10/22/22 at 10:48 AM EDT    PHYSICIAN SECTION    Prognosis: Fair    Condition at Discharge: Stable    Rehab Potential (if transferring to Rehab): Fair    Recommended Labs or Other Treatments After Discharge: -    Physician Certification: I certify the above information and transfer of Kay Mendoza  is necessary for the continuing treatment of the diagnosis listed and that she requires Home Care for less 30 days. Update Admission H&P: No change in H&P    PHYSICIAN SIGNATURE:  Electronically signed by GERARD Montes De Oca RN on 10/22/22 at 10:48 AM EDT

## 2022-10-20 NOTE — PROGRESS NOTES
AM assessment complete, see flowsheet. Medications given per MAR. Pt states, \"I'm feeling better than when I came in but still just feel rough overall\". A&O x4, minimal appetite for breakfast. BP elevated, home BP med given, will monitor its effectiveness. Pt denies any other needs, awaiting morning rounds.

## 2022-10-20 NOTE — PROGRESS NOTES
RT Nebulizer Bronchodilator Protocol Note    There is a bronchodilator order in the chart from a provider indicating to follow the RT Bronchodilator Protocol and there is an Initiate RT Bronchodilator Protocol order as well (see protocol at bottom of note). CXR Findings:  XR CHEST PORTABLE    Result Date: 10/19/2022  1. Severe radiographic findings typical of pulmonary fibrosis. Correlate with clinical history. 2. No superimposed pulmonary infiltrate evident radiographically. 3. Calcific atherosclerosis aorta. 4. Cardiomegaly. The findings from the last RT Protocol Assessment were as follows:  Smoking: Chronic pulmonary disease  Respiratory Pattern: Regular pattern and RR 12-20 bpm  Breath Sounds: Slightly diminished and/or crackles  Cough: Strong, spontaneous, non-productive  Indication for Bronchodilator Therapy: Decreased or absent breath sounds  Bronchodilator Assessment Score: 4    Aerosolized bronchodilator medication orders have been revised according to the RT Nebulizer Bronchodilator Protocol below. Respiratory Therapist to perform RT Therapy Protocol Assessment initially then follow the protocol. Repeat RT Therapy Protocol Assessment PRN for score 0-3 or on second treatment, BID, and PRN for scores above 3. No Indications - adjust the frequency to every 6 hours PRN wheezing or bronchospasm, if no treatments needed after 48 hours then discontinue using Per Protocol order mode. If indication present, adjust the RT bronchodilator orders based on the Bronchodilator Assessment Score as indicated below. If a patient is on this medication at home then do not decrease Frequency below that used at home. 0-3 - enter or revise RT bronchodilator order(s) to equivalent RT Bronchodilator order with Frequency of every 4 hours PRN for wheezing or increased work of breathing using Per Protocol order mode.        4-6 - enter or revise RT Bronchodilator order(s) to two equivalent RT bronchodilator orders with one order with BID Frequency and one order with Frequency of every 4 hours PRN wheezing or increased work of breathing using Per Protocol order mode. 7-10 - enter or revise RT Bronchodilator order(s) to two equivalent RT bronchodilator orders with one order with TID Frequency and one order with Frequency of every 4 hours PRN wheezing or increased work of breathing using Per Protocol order mode. 11-13 - enter or revise RT Bronchodilator order(s) to one equivalent RT bronchodilator order with QID Frequency and an Albuterol order with Frequency of every 4 hours PRN wheezing or increased work of breathing using Per Protocol order mode. Greater than 13 - enter or revise RT Bronchodilator order(s) to one equivalent RT bronchodilator order with every 4 hours Frequency and an Albuterol order with Frequency of every 2 hours PRN wheezing or increased work of breathing using Per Protocol order mode. RT to enter RT Home Evaluation for COPD & MDI Assessment order using Per Protocol order mode.     Electronically signed by Deniz Carson RCP on 10/20/2022 at 8:13 AM

## 2022-10-20 NOTE — PROGRESS NOTES
RT Inhaler-Nebulizer Bronchodilator Protocol Note    There is a bronchodilator order in the chart from a provider indicating to follow the RT Bronchodilator Protocol and there is an Initiate RT Inhaler-Nebulizer Bronchodilator Protocol order as well (see protocol at bottom of note). CXR Findings:  XR CHEST PORTABLE    Result Date: 10/19/2022  1. Severe radiographic findings typical of pulmonary fibrosis. Correlate with clinical history. 2. No superimposed pulmonary infiltrate evident radiographically. 3. Calcific atherosclerosis aorta. 4. Cardiomegaly. The findings from the last RT Protocol Assessment were as follows:   History Pulmonary Disease: Chronic pulmonary disease  Respiratory Pattern: Regular pattern and RR 12-20 bpm  Breath Sounds: Slightly diminished and/or crackles  Cough: Strong, spontaneous, non-productive  Indication for Bronchodilator Therapy: Decreased or absent breath sounds  Bronchodilator Assessment Score: 4    Aerosolized bronchodilator medication orders have been revised according to the RT Inhaler-Nebulizer Bronchodilator Protocol below. Respiratory Therapist to perform RT Therapy Protocol Assessment initially then follow the protocol. Repeat RT Therapy Protocol Assessment PRN for score 0-3 or on second treatment, BID, and PRN for scores above 3. No Indications - adjust the frequency to every 6 hours PRN wheezing or bronchospasm, if no treatments needed after 48 hours then discontinue using Per Protocol order mode. If indication present, adjust the RT bronchodilator orders based on the Bronchodilator Assessment Score as indicated below.   Use Inhaler orders unless patient has one or more of the following: on home nebulizer, not able to hold breath for 10 seconds, is not alert and oriented, cannot activate and use MDI correctly, or respiratory rate 25 breaths per minute or more, then use the equivalent nebulizer order(s) with same Frequency and PRN reasons based on the score.  If a patient is on this medication at home then do not decrease Frequency below that used at home. 0-3 - enter or revise RT bronchodilator order(s) to equivalent RT Bronchodilator order with Frequency of every 4 hours PRN for wheezing or increased work of breathing using Per Protocol order mode. 4-6 - enter or revise RT Bronchodilator order(s) to two equivalent RT bronchodilator orders with one order with BID Frequency and one order with Frequency of every 4 hours PRN wheezing or increased work of breathing using Per Protocol order mode. 7-10 - enter or revise RT Bronchodilator order(s) to two equivalent RT bronchodilator orders with one order with TID Frequency and one order with Frequency of every 4 hours PRN wheezing or increased work of breathing using Per Protocol order mode. 11-13 - enter or revise RT Bronchodilator order(s) to one equivalent RT bronchodilator order with QID Frequency and an Albuterol order with Frequency of every 4 hours PRN wheezing or increased work of breathing using Per Protocol order mode. Greater than 13 - enter or revise RT Bronchodilator order(s) to one equivalent RT bronchodilator order with every 4 hours Frequency and an Albuterol order with Frequency of every 2 hours PRN wheezing or increased work of breathing using Per Protocol order mode.          Electronically signed by Nelson Barnes RCP on 10/20/2022 at 2:16 AM

## 2022-10-20 NOTE — CARE COORDINATION
Case Management Assessment  Initial Evaluation      Patient Name: Karly Obando  YOB: 1955  Diagnosis: Acute on chronic respiratory failure ([facility] [address] [J96.20]  Acute on chronic respiratory failure with hypoxia ([facility] [address] [J96.21]  Diarrhea, unspecified type [R19.7]  Date / Time: 10/19/2022  3:53 PM    Admission status/Date:10/19/2022 Inpatient   Chart Reviewed: Yes      Patient Interviewed: Yes   Family Interviewed:  No      Hospitalization in the last 30 days:  Yes     Health Care Decision Maker :   Primary Decision Maker: Philly Morgan - Spouse - [phone]    Secondary Decision Maker: Tana Ramsays - Child - [phone]    (CM - must 1st enter selection under Navigator - emergency contact- [address] Relationship and pick relationship)   Who do you trust or have selected to make healthcare decisions for you    Met with: pt   Interview conducted  (bedside/phone): bedside    Current PCP: Darcella Cranker, MD    Financial  [employer] Medicare and Medicaid  Precert required for SNF : Y          3 night stay required -  N    ADLS  Support Systems/Care Needs:    Transportation: family    Meal Preparation: family    Housing  Living Arrangements: pt lives at home with her  who is with her 24/7  Steps: ramp  Intent for return to present living arrangements: Yes  Identified Issues: [address] with [address] : Yes - [employer] Agency:(Services) linda confirmed pt is active with them for RN/PT/ST/OT  Passport/Waiver : Passport : She states she thinks it is [place]. Tresa and the hospital can speak with them if they call. Phone Number:    Passport/Waiver Services: pt stated her passport services are on hold   [facility] Provider: Kayley ([phone])-[facility] confirmed pt is active with them for 3 liters cont.    Equipment:   Walker__(Rolling walker)_Cane___RTS___ BSC___Shower Chair___Hospital Bed___W/C____Other___Rolator_____  02 at ____Liter(s)---wears(frequency)_______ [facility] ___ CPAP___ BiPap___   N/A____    Home O2 Use :  Yes    If No for home O2---if presently on O2 during hospitalization:  Yes  if yes CM to follow for potential DC O2 need  Informed of need for care provider to bring portable home O2 tank on day of discharge for nursing to connect prior to leaving:   Yes  Verbalized agreement/Understanding:   Yes    Community Service Affiliation  Dialysis:  No    Agency:  Location:  Dialysis Schedule:  Phone:   Fax: Other Community Services: n/a    DISCHARGE PLAN: Explained Case Management role/services. Chart review completed. Met with pt at bedside. She stated she is independent at home with her ADL's and plans on returning home when discharged. She stated she will have a ride home when discharged. CM will follow. Please notify CM if needs or concerns arise.      Wheeler Goldmann MSW, FAB-S

## 2022-10-20 NOTE — CONSULTS
Reason for referral and CC: Hypoxia and diarrhea    HISTORY OF PRESENT ILLNESS: 68 yo  with pulmonary fibrosis and chronic hypoxia presented to the ED with c/o 3 days of diarrhea. While in the ED she received IVF. SHe became SOB and required 10L O2 and was admitted to the hospital. Lomotil has helped the diarrhea. Oxygenation returned to baseline as well. Denies increase in SOB from baseline. No abd pain or blood in stool    Past Medical History:   Diagnosis Date    A-fib ([facility] [address]     Anxiety     Cryptogenic organizing pneumonia ([facility] [address]     Depression     GERD (gastroesophageal reflux disease)     Hyperlipidemia     On home oxygen therapy     uses O2 3L prn    Rash     Thyroid disease     hypothyroidism     Past Surgical History:   Procedure Laterality Date    ARM SURGERY Left 3/2/2020    LEFT ULNAR NERVE DECOMPRESSION AT THE ELBOW performed by Deidre Kingsley MD at [facility] N/A 6/27/2019    EBUS WF W/ANES.  performed by Suhas Simon MD at [place] [address]  6/27/2019    BRONCHOSCOPY/TRANSBRONCHIAL NEEDLE BIOPSY performed by Suhas Simon MD at [place] [address]  6/27/2019    BRONCHOSCOPY/TRANSBRONCHIAL LUNG BIOPSY performed by Suhas Simon MD at [place] [address]  6/27/2019    BRONCHOSCOPY ALVEOLAR LAVAGE performed by Suhas Simon MD at [place] [address]  07/10/2019    BRONCHOSCOPY N/A 7/10/2019    BRONCHOSCOPY ALVEOLAR LAVAGE performed by Melanie Harmon MD at [place] [address] Bilateral 08/06/2019    BRONCHOSCOPY N/A 8/6/2019    BRONCHOSCOPY ALVEOLAR LAVAGE performed by Blanquita Brink MD at [place] [address] N/A 12/24/2019    BRONCHOSCOPY ALVEOLAR LAVAGE performed by Yovany Aiken MD at [address] 8/25/2022    COLONOSCOPY POLYPECTOMY SNARE/COLD BIOPSY performed by Charito Gomez MD at [address] [facility] [place]  9/15/2022    CT GUIDED CHEST TUBE 9/15/2022 [facility] [place]    HYSTERECTOMY, TOTAL ABDOMINAL (CERVIX REMOVED)      partial     Family History  family history includes Asthma in her sister; Diabetes in her mother; High Blood Pressure in her mother; Other in her father. Social History:  reports that she has never smoked. She has never used smokeless tobacco.   reports no history of alcohol use. ALLERGIES:  Patient is allergic to penicillins, cefuroxime, doxycycline, imitrex [sumatriptan], meperidine, other, sulfa antibiotics, keflex [cephalexin], and tape [adhesive tape].   Continuous Infusions:   dextrose      sodium chloride       Scheduled Meds:   sertraline  200 mg Oral Daily    nadolol  20 mg Oral Daily    mirtazapine  30 mg Oral Nightly    levothyroxine  125 mcg Oral Daily    ipratropium-albuterol  1 ampule Inhalation BID    enoxaparin  30 mg SubCUTAneous Daily    [Held by provider] Pirfenidone  801 mg Oral TID    pantoprazole  40 mg Oral QAM AC    atorvastatin  40 mg Oral Daily    sodium chloride flush  5-40 mL IntraVENous 2 times per day    insulin lispro  0-8 Units SubCUTAneous TID WC    insulin lispro  0-4 Units SubCUTAneous Nightly     PRN Meds:  hydrOXYzine HCl, benzonatate, polyethylene glycol, potassium chloride **OR** potassium alternative oral replacement **OR** potassium chloride, magnesium sulfate, diphenoxylate-atropine, ipratropium-albuterol, glucose, dextrose bolus **OR** dextrose bolus, glucagon (rDNA), dextrose, sodium chloride flush, sodium chloride, ondansetron **OR** ondansetron, acetaminophen **OR** acetaminophen    REVIEW OF SYSTEMS:  Constitutional: Negative for fever  HENT: Negative for sore throat  Eyes: Negative for redness   Respiratory: Negative for dyspnea, cough  Cardiovascular: Negative for chest pain  Gastrointestinal: Negative for vomiting, +diarrhea   Genitourinary: Negative for hematuria   Musculoskeletal: Negative for arthralgias   Skin: Negative for rash  Neurological: Negative for syncope  Hematological: Negative for adenopathy  Psychiatric/Behavorial: Negative for anxiety    PHYSICAL EXAM: BP (!) 148/88   Pulse 86   Temp 98.5 °F (36.9 °C) (Oral)   Resp 28   Ht 5' 3\" (1.6 m)   Wt 109 lb 14.4 oz (49.9 kg)   SpO2 99%   BMI 19.47 kg/m²  on 3L  Constitutional:  No acute distress. Eyes: PERRL. Conjunctivae anicteric. ENT: Normal nose. Normal tongue. Neck:  Trachea is midline. No thyroid tenderness. Respiratory: No accessory muscle usage. No wheezes. + rales. No Rhonchi. Cardiovascular: Normal S1S2. No digit clubbing. No digit cyanosis. No LE edema. Gastrointestinal: No mass palpated. No tenderness palpated. No umbilical hernia. Lymphatic: No cervical or supraclavicular adenopathy. Skin: No rash on the exposed extremities. No Nodules or induration on exposed extremities. Psychiatric: No anxiety or Agitation. Alert and Oriented to person, place and time. CBC:   Recent Labs     10/19/22  1621 10/20/22  0447   WBC 8.6 8.0   HGB 10.4* 9.9*   HCT 33.9* 31.1*   MCV 80.7 79.2*    255     BMP:   Recent Labs     10/19/22  1621 10/20/22  0447    140   K 4.2 3.5    101   CO2 32 26   BUN 10 9   CREATININE <0.5* <0.5*        Recent Labs     10/19/22  1621   AST 33   ALT 13   LIPASE 93.0*   BILITOT 0.5   ALKPHOS 136*     No results for input(s): PROTIME, INR in the last 72 hours. Recent Labs     10/19/22  2030   COLORU Yellow   PHUR 6.5   WBCUA None seen   RBCUA None seen   MUCUS Rare*   CLARITYU Clear   SPECGRAV 1.010   LEUKOCYTESUR Negative   UROBILINOGEN 0.2   BILIRUBINUR Negative   BLOODU Negative   GLUCOSEU Negative     No results for input(s): PHART, CGB4LDH, PO2ART in the last 72 hours. Chest imaging was reviewed by me and showed CTPA: unchanged  1. No evidence of pulmonary embolism. 2. Again seen extensive interstitial pulmonary fibrosis. An underlying   superimposed acute pulmonary process is not readily appreciated but difficult   to exclude. ASSESSMENT:   Diarrhea: possible viral vs food reaction vs side effect of Ofev which she had been tolerating well  Acute on chronic hypoxic respiratory failure  - back to baseline  Pulmonary fibrosis: see clinic notes for specifics    PLAN:  Hold Ofev  Supplemental oxygen to maintain SaO2 >92%; wean as tolerated    IVF stopped in ED  Home inhaler regimen  Hold Esbriet  Ok to use lomotil PRN ar home as well. Take Ofev with meals.     Thank you Yohan Owens MD for this consult

## 2022-10-20 NOTE — PROGRESS NOTES
Overnight events:   Pt admitted to the ICU for Pcu overflow. Pt lives at home with spouse. Pt came in for diarrhea x 2 days, states she couldn't get it under control. C-diff was negative. Pt afebrile. While in the ER pt O2 demands increased where she was increased to 10L HF, pt home dose is 4 L. Upon coming to the unit, pt was decreased back to her rate of 4L with no difficulties. Pt has been having generalized weakness for a few days.   AM labs with K 3.5-40meq given    Neuro:  A & O x 4    Resp:  O2 4L -home dose    Cardiac:  NSR/ST    GI:  Regular diet with 3 carb choices  Diarrhea   Pt able to use the bedpan, gets sob with exertion     :  Continent of urine, no difficulty with urination    ENDOCRINE:   Pt has history of DM, BS ACHS    Skin:  No skin issues noted  Old back scar    Lines:  Piv x 1    Gtts:  none

## 2022-10-21 PROBLEM — I48.0 PAF (PAROXYSMAL ATRIAL FIBRILLATION) (HCC): Status: ACTIVE | Noted: 2022-10-21

## 2022-10-21 PROBLEM — I47.10 SVT (SUPRAVENTRICULAR TACHYCARDIA): Status: ACTIVE | Noted: 2022-10-21

## 2022-10-21 PROBLEM — I47.1 SVT (SUPRAVENTRICULAR TACHYCARDIA) (HCC): Status: ACTIVE | Noted: 2022-10-21

## 2022-10-21 LAB
ANION GAP SERPL CALCULATED.3IONS-SCNC: 12 MMOL/L (ref 3–16)
BASOPHILS ABSOLUTE: 0.1 K/UL (ref 0–0.2)
BASOPHILS RELATIVE PERCENT: 1 %
BUN BLDV-MCNC: 18 MG/DL (ref 7–20)
CALCIUM SERPL-MCNC: 8.5 MG/DL (ref 8.3–10.6)
CHLORIDE BLD-SCNC: 103 MMOL/L (ref 99–110)
CO2: 22 MMOL/L (ref 21–32)
CREAT SERPL-MCNC: <0.5 MG/DL (ref 0.6–1.2)
EKG ATRIAL RATE: 85 BPM
EKG DIAGNOSIS: NORMAL
EKG P AXIS: 35 DEGREES
EKG P-R INTERVAL: 146 MS
EKG Q-T INTERVAL: 364 MS
EKG QRS DURATION: 70 MS
EKG QTC CALCULATION (BAZETT): 433 MS
EKG R AXIS: 95 DEGREES
EKG T AXIS: -9 DEGREES
EKG VENTRICULAR RATE: 85 BPM
EOSINOPHILS ABSOLUTE: 0.6 K/UL (ref 0–0.6)
EOSINOPHILS RELATIVE PERCENT: 5.2 %
GFR SERPL CREATININE-BSD FRML MDRD: >60 ML/MIN/{1.73_M2}
GLUCOSE BLD-MCNC: 117 MG/DL (ref 70–99)
GLUCOSE BLD-MCNC: 123 MG/DL (ref 70–99)
GLUCOSE BLD-MCNC: 127 MG/DL (ref 70–99)
GLUCOSE BLD-MCNC: 151 MG/DL (ref 70–99)
HCT VFR BLD CALC: 34 % (ref 36–48)
HEMOGLOBIN: 10.5 G/DL (ref 12–16)
LYMPHOCYTES ABSOLUTE: 2.5 K/UL (ref 1–5.1)
LYMPHOCYTES RELATIVE PERCENT: 22.2 %
MAGNESIUM: 1.7 MG/DL (ref 1.8–2.4)
MCH RBC QN AUTO: 24.1 PG (ref 26–34)
MCHC RBC AUTO-ENTMCNC: 30.9 G/DL (ref 31–36)
MCV RBC AUTO: 78.1 FL (ref 80–100)
MONOCYTES ABSOLUTE: 0.7 K/UL (ref 0–1.3)
MONOCYTES RELATIVE PERCENT: 5.8 %
NEUTROPHILS ABSOLUTE: 7.4 K/UL (ref 1.7–7.7)
NEUTROPHILS RELATIVE PERCENT: 65.8 %
PDW BLD-RTO: 15.8 % (ref 12.4–15.4)
PERFORMED ON: ABNORMAL
PLATELET # BLD: 301 K/UL (ref 135–450)
PMV BLD AUTO: 7.5 FL (ref 5–10.5)
POTASSIUM REFLEX MAGNESIUM: 3.4 MMOL/L (ref 3.5–5.1)
RBC # BLD: 4.35 M/UL (ref 4–5.2)
SODIUM BLD-SCNC: 137 MMOL/L (ref 136–145)
WBC # BLD: 11.3 K/UL (ref 4–11)

## 2022-10-21 PROCEDURE — 6360000002 HC RX W HCPCS

## 2022-10-21 PROCEDURE — 6360000002 HC RX W HCPCS: Performed by: INTERNAL MEDICINE

## 2022-10-21 PROCEDURE — 94761 N-INVAS EAR/PLS OXIMETRY MLT: CPT

## 2022-10-21 PROCEDURE — 2060000000 HC ICU INTERMEDIATE R&B

## 2022-10-21 PROCEDURE — 2580000003 HC RX 258: Performed by: INTERNAL MEDICINE

## 2022-10-21 PROCEDURE — 99232 SBSQ HOSP IP/OBS MODERATE 35: CPT

## 2022-10-21 PROCEDURE — 36415 COLL VENOUS BLD VENIPUNCTURE: CPT

## 2022-10-21 PROCEDURE — 93010 ELECTROCARDIOGRAM REPORT: CPT | Performed by: INTERNAL MEDICINE

## 2022-10-21 PROCEDURE — 80048 BASIC METABOLIC PNL TOTAL CA: CPT

## 2022-10-21 PROCEDURE — 85025 COMPLETE CBC W/AUTO DIFF WBC: CPT

## 2022-10-21 PROCEDURE — 6370000000 HC RX 637 (ALT 250 FOR IP): Performed by: INTERNAL MEDICINE

## 2022-10-21 PROCEDURE — 94640 AIRWAY INHALATION TREATMENT: CPT

## 2022-10-21 PROCEDURE — 2700000000 HC OXYGEN THERAPY PER DAY

## 2022-10-21 PROCEDURE — 99233 SBSQ HOSP IP/OBS HIGH 50: CPT | Performed by: INTERNAL MEDICINE

## 2022-10-21 PROCEDURE — 93005 ELECTROCARDIOGRAM TRACING: CPT

## 2022-10-21 PROCEDURE — 99223 1ST HOSP IP/OBS HIGH 75: CPT | Performed by: INTERNAL MEDICINE

## 2022-10-21 PROCEDURE — 83735 ASSAY OF MAGNESIUM: CPT

## 2022-10-21 RX ORDER — METOPROLOL SUCCINATE 25 MG/1
25 TABLET, EXTENDED RELEASE ORAL DAILY
Status: DISCONTINUED | OUTPATIENT
Start: 2022-10-21 | End: 2022-10-22 | Stop reason: HOSPADM

## 2022-10-21 RX ORDER — MAGNESIUM SULFATE IN WATER 40 MG/ML
2000 INJECTION, SOLUTION INTRAVENOUS ONCE
Status: COMPLETED | OUTPATIENT
Start: 2022-10-21 | End: 2022-10-21

## 2022-10-21 RX ADMIN — Medication 10 ML: at 17:24

## 2022-10-21 RX ADMIN — IPRATROPIUM BROMIDE AND ALBUTEROL SULFATE 1 AMPULE: 2.5; .5 SOLUTION RESPIRATORY (INHALATION) at 19:19

## 2022-10-21 RX ADMIN — ONDANSETRON HYDROCHLORIDE 4 MG: 2 INJECTION, SOLUTION INTRAMUSCULAR; INTRAVENOUS at 02:08

## 2022-10-21 RX ADMIN — ACETAMINOPHEN 650 MG: 325 TABLET ORAL at 02:08

## 2022-10-21 RX ADMIN — LEVOTHYROXINE SODIUM 125 MCG: 125 TABLET ORAL at 05:17

## 2022-10-21 RX ADMIN — SODIUM CHLORIDE, PRESERVATIVE FREE 10 ML: 5 INJECTION INTRAVENOUS at 11:27

## 2022-10-21 RX ADMIN — DIPHENOXYLATE HYDROCHLORIDE AND ATROPINE SULFATE 1 TABLET: 2.5; .025 TABLET ORAL at 09:57

## 2022-10-21 RX ADMIN — BENZONATATE 200 MG: 100 CAPSULE ORAL at 22:17

## 2022-10-21 RX ADMIN — SERTRALINE 200 MG: 100 TABLET, FILM COATED ORAL at 09:57

## 2022-10-21 RX ADMIN — PANTOPRAZOLE SODIUM 40 MG: 40 TABLET, DELAYED RELEASE ORAL at 05:17

## 2022-10-21 RX ADMIN — ONDANSETRON HYDROCHLORIDE 4 MG: 2 INJECTION, SOLUTION INTRAMUSCULAR; INTRAVENOUS at 11:30

## 2022-10-21 RX ADMIN — NADOLOL 20 MG: 40 TABLET ORAL at 09:57

## 2022-10-21 RX ADMIN — ATORVASTATIN CALCIUM 40 MG: 40 TABLET, FILM COATED ORAL at 09:57

## 2022-10-21 RX ADMIN — ENOXAPARIN SODIUM 30 MG: 100 INJECTION SUBCUTANEOUS at 09:58

## 2022-10-21 RX ADMIN — MIRTAZAPINE 30 MG: 30 TABLET, FILM COATED ORAL at 20:30

## 2022-10-21 RX ADMIN — IPRATROPIUM BROMIDE AND ALBUTEROL SULFATE 1 AMPULE: 2.5; .5 SOLUTION RESPIRATORY (INHALATION) at 08:12

## 2022-10-21 RX ADMIN — POTASSIUM BICARBONATE 40 MEQ: 391 TABLET, EFFERVESCENT ORAL at 15:26

## 2022-10-21 RX ADMIN — SODIUM CHLORIDE, PRESERVATIVE FREE 10 ML: 5 INJECTION INTRAVENOUS at 20:30

## 2022-10-21 RX ADMIN — METOPROLOL SUCCINATE 25 MG: 25 TABLET, FILM COATED, EXTENDED RELEASE ORAL at 13:17

## 2022-10-21 RX ADMIN — MAGNESIUM SULFATE HEPTAHYDRATE 2000 MG: 40 INJECTION, SOLUTION INTRAVENOUS at 15:29

## 2022-10-21 ASSESSMENT — PAIN DESCRIPTION - LOCATION: LOCATION: HEAD

## 2022-10-21 ASSESSMENT — PAIN - FUNCTIONAL ASSESSMENT: PAIN_FUNCTIONAL_ASSESSMENT: ACTIVITIES ARE NOT PREVENTED

## 2022-10-21 ASSESSMENT — PAIN SCALES - GENERAL
PAINLEVEL_OUTOF10: 0
PAINLEVEL_OUTOF10: 4

## 2022-10-21 ASSESSMENT — PAIN DESCRIPTION - ORIENTATION: ORIENTATION: MID

## 2022-10-21 ASSESSMENT — PAIN DESCRIPTION - DESCRIPTORS: DESCRIPTORS: ACHING

## 2022-10-21 NOTE — CONSULTS
[address] [facility]  [phone]        Reason for Consultation/Chief Complaint: \"I have been having diarrhea and hypoxia. \"  Consulted for SVT in setting She was last seen by Dr. Brandon Toledo 8/24/2017    History of Present Illness: Davon Melgoza is a 69 y.o. patient who [facility] with complaints of diarrhea. She has PMH of severe pulm fibrosis, chronic resp failure on 4L NC home, prior afib,, COVID 9/23/2022, GERD, HLD, and thyroid disease. Most recent RHC/LHC 8/9/20219 NOCAD with normal filling pressures. Most recent Echo 5/6/2022 EF=55%; mild cLVH; no WMA; indeterminate LV filling pressure; SPAP 22mmHg. Most recent CT Chest 7/15/2022 noted ILD. She presented with c/o of diarrhea and malaise for 2 days after recent COVID infection. She presented mildly tachycardic. She was treated with 1L IVF but desatted on her baseline 4 L O2 down in the 70's % range and placed on high flow NC 10L. She was tx with dose of Lasix and had rapid improvement. Chest CT noted severe pulmonary fibrosis. EKG noted NSR; LAE; RSR or QR pattern in V1; T wave abnormality inferior/anterior leads consider ischemia 89 bpm (compared to 9/22 EKG T wave change new)  LABS: , K 4.2, Cl 102, CO2 32, BUN/Cr 10/<0.5, Ca 10.2, BNP 1,234, ALT 13, AST 33, H/H 10.4/33.9 and She <0.01. She was admitted for further treatment. She had 2 runs of SVT around 160bpm on tele today (8 and 12 beats). She denies any palps. She c/o SOB with movement but no no c/o CP, dizziness, edema, or orthopnea/PND. I have been asked to provide consultation regarding further management and testing. Past Medical History:   has a past medical history of A-fib ([address] Anxiety, Cryptogenic organizing pneumonia ([facility] [address] Depression, GERD (gastroesophageal reflux disease), Hyperlipidemia, On home oxygen therapy, Rash, and Thyroid disease.     Surgical History:   has a past surgical history that includes Cholecystectomy; bronchoscopy (N/A, 6/27/2019); bronchoscopy (6/27/2019); bronchoscopy (6/27/2019); bronchoscopy (6/27/2019); bronchoscopy (07/10/2019); bronchoscopy (N/A, 7/10/2019); Hysterectomy, total abdominal; bronchoscopy (Bilateral, 08/06/2019); bronchoscopy (N/A, 8/6/2019); bronchoscopy (N/A, 12/24/2019); Arm Surgery (Left, 3/2/2020); Colonoscopy (N/A, 8/25/2022); and CT GUIDED CHEST TUBE (9/15/2022). Social History:   reports that she has never smoked. She has never used smokeless tobacco. She reports that she does not drink alcohol and does not use drugs. Family History:  family history includes Asthma in her sister; Diabetes in her mother; High Blood Pressure in her mother; Other in her father. Home Medications:  Were reviewed and are listed in nursing record. and/or listed below  Prior to Admission medications    Medication Sig Start Date End Date Taking? Authorizing Provider   LORazepam (ATIVAN) 0.5 MG tablet Take 0.5 mg by mouth every 6 hours as needed for Anxiety. Yes Historical Provider, MD   traMADol (ULTRAM) 50 MG tablet Take 50 mg by mouth every 6 hours as needed for Pain.    Yes Historical Provider, MD   omeprazole (PRILOSEC) 40 MG delayed release capsule Take 1 capsule by mouth every morning (before breakfast) 10/3/22   Malaika Adame MD   magnesium oxide (MAG-OX) 400 (240 Mg) MG tablet Take 1 tablet by mouth daily  Patient not taking: Reported on 10/20/2022 9/2/22   Memo Duenas MD   potassium chloride (KLOR-CON) 10 MEQ extended release tablet Take 1 tablet by mouth 2 times daily  Patient not taking: Reported on 10/20/2022 9/1/22   Memo Duenas MD   polycarbophil (FIBERCON) 625 MG tablet Take 1 tablet by mouth at bedtime  Patient not taking: Reported on 10/20/2022 9/1/22   Memo Duenas MD   Ergocalciferol (VITAMIN D) 46065 units CAPS Take 50,000 Units by mouth once a week 9/4/22   Memo Duenas MD   Pirfenidone 267 MG TABS Take 3 tablets by mouth in the morning, at noon, and at bedtime 8/26/22   Junaid Ulloa MD   albuterol sulfate HFA (PROVENTIL;VENTOLIN;PROAIR) 108 (90 Base) MCG/ACT inhaler INHALE 2 PUFFS INTO THE LUNGS EVERY 6 HOURS AS NEEDED FOR WHEEZING 7/13/22   Ani Wade MD   atorvastatin (LIPITOR) 40 MG tablet Take 1 tablet by mouth daily 5/27/22   Moustapha Ward, MD   levothyroxine (SYNTHROID) 125 MCG tablet Take 1 tablet by mouth Daily TAKE 1 TABLET BY MOUTH EVERY DAY 5/27/22   Moustapha Ward, MD   sertraline (ZOLOFT) 100 MG tablet Take 2 tablets by mouth daily 5/27/22   Moustapha Ward, MD   mirtazapine (REMERON) 30 MG tablet Take 1 tablet by mouth nightly 5/27/22   Moustapha Ward, MD   nadolol (CORGARD) 20 MG tablet Take 1 tablet by mouth daily 5/27/22   Moustapha Ward, MD   loratadine-pseudoephedrine ([employer] ALLERGY RELIEF-D12) 5-120 MG per extended release tablet TAKE 1 TABLET BY MOUTH TWICE A DAY 5/27/22   Moustapha Ward, MD   benzonatate (TESSALON) 200 MG capsule TAKE 1 CAPSULE BY MOUTH 3 TIMES A DAY AS NEEDED FOR COUGH 5/27/22   Moustapha Ward, MD   empagliflozin (JARDIANCE) 25 MG tablet Take 1 tablet by mouth daily 5/27/22 9/1/22  Moustapha Ward, MD   SITagliptin (JANUVIA) 100 MG tablet TAKE 1 TABLET BY MOUTH EVERY DAY 5/27/22 9/1/22  Moustapha Ward, MD   hydrOXYzine (ATARAX) 10 MG tablet Take 1 tablet by mouth every 8 hours as needed for Itching TAKE 1 TO 3 TABLETS BY MOUTH 3 TIMES DAILY AS NEEDED FOR ITCHING 5/8/22   TammyPANDA Pacheco - CNP   furosemide (LASIX) 40 MG tablet Take 1 tablet by mouth daily 5/9/22 9/1/22  PANDA Hernandez - CNP   acetaminophen (TYLENOL) 325 MG tablet  4/21/22   Historical Provider, MD   guaiFENesin (MUCINEX) 600 MG extended release tablet Take 1,200 mg by mouth every morning    Historical Provider, MD   albuterol (PROVENTIL) (2.5 MG/3ML) 0.083% nebulizer solution INHALE THE CONTENTS OF 1 VIAL VIA NEBULIZER EVERY 6 HOURS AS NEEDED FOR WHEEZING 2/7/22   Moustapha Ward MD   Blood Glucose Monitoring Suppl (TRUE METRIX METER) PUJA Test daily and as needed 9/10/21   Yarely Gomez MD   blood glucose test strips (ASCENSIA AUTODISC VI;ONE TOUCH ULTRA TEST VI) strip 1 each by In Vitro route daily As needed.  9/10/21   Yarely Gomez MD   TRUEplus Lancets 33G MISC Use daily 9/10/21   Yarely Gomez MD   Blood Glucose Calibration (TRUE METRIX LEVEL 1) Low SOLN use as needed 9/9/21   Yarely Gomez MD   INSULIN SYRINGE 1CC/29G 29G X 1/2\" 1 ML MISC 1 each by Does not apply route 2 times daily 3/6/20   Yarely oGmez MD   OXYGEN Inhale 3.5 L into the lungs 7/14/19   Historical Provider, MD        Allergies:  Penicillins, Cefuroxime, Doxycycline, Imitrex [sumatriptan], Meperidine, Other, Sulfa antibiotics, Keflex [cephalexin], and Tape [adhesive tape]     Review of Systems:   12 point ROS negative in all areas as listed below except as in [place]  Constitutional, EENT, Cardiovascular, pulmonary, GI, , Musculoskeletal, skin, neurological, hematological, endocrine, Psychiatric    Physical Examination:    Vitals:    10/21/22 1006   BP:    Pulse: 100   Resp:    Temp:    SpO2:     Weight: 106 lb 1 oz (48.1 kg)         General Appearance:  Alert, cooperative, no distress, appears frail and older than stated age   Head:  Normocephalic, without obvious abnormality, atraumatic   Eyes:  PERRL, conjunctiva/corneas clear       Nose: Nares normal, no drainage or sinus tenderness   Throat: Lips, mucosa, and tongue normal   Neck: Supple, symmetrical, trachea midline, no adenopathy, thyroid: not enlarged, symmetric, no tenderness/mass/nodules, no carotid bruit or JVD       Lungs:   Soft breath sounds: respirations unlabored   Chest Wall:  No tenderness or deformity   Heart:  Regular rate and rhythm, S1, S2 normal, no murmur, rub or gallop   Abdomen:   Soft, non-tender, bowel sounds active all four quadrants,  no masses, no organomegaly           Extremities: Extremities normal, atraumatic, no cyanosis or edema   Pulses: 2+ and symmetric   Skin: Skin color, texture, turgor normal, no rashes or lesions   Pysch: Normal mood and affect   Neurologic: Normal gross motor and sensory exam.         Labs  CBC:   Lab Results   Component Value Date/Time    WBC 8.0 10/20/2022 04:47 AM    RBC 3.93 10/20/2022 04:47 AM    HGB 9.9 10/20/2022 04:47 AM    HCT 31.1 10/20/2022 04:47 AM    MCV 79.2 10/20/2022 04:47 AM    RDW 15.8 10/20/2022 04:47 AM     10/20/2022 04:47 AM     CMP:    Lab Results   Component Value Date/Time     10/20/2022 04:47 AM    K 3.5 10/20/2022 04:47 AM     10/20/2022 04:47 AM    CO2 26 10/20/2022 04:47 AM    BUN 9 10/20/2022 04:47 AM    CREATININE <0.5 10/20/2022 04:47 AM    GFRAA >60 09/23/2022 08:19 AM    AGRATIO 0.8 10/19/2022 04:21 PM    LABGLOM >60 10/20/2022 04:47 AM    GLUCOSE 128 10/20/2022 04:47 AM    PROT 7.4 10/19/2022 04:21 PM    CALCIUM 8.3 10/20/2022 04:47 AM    BILITOT 0.5 10/19/2022 04:21 PM    ALKPHOS 136 10/19/2022 04:21 PM    AST 33 10/19/2022 04:21 PM    ALT 13 10/19/2022 04:21 PM     PT/INR:  No results found for: PTINR  Lab Results   Component Value Date    CKTOTAL 23 (L) 07/10/2019    TROPONINI <0.01 10/19/2022       EKG:  I have reviewed EKG with the following interpretation:  Impression:  See HPI    Assessment: Trevor Nicole is a 69 y.o. patient who presented to [facility]/19/[facility]22 with complaints of diarrhea. She has PMH of severe pulm fibrosis, chronic resp failure on 4L NC home, prior afib,, COVID 9/23/2022, GERD, HLD, and thyroid disease. Most recent RHC/LHC 8/9/20219 NOCAD with normal filling pressures. Most recent Echo 5/6/2022 EF=55%; mild cLVH; no WMA; indeterminate LV filling pressure; SPAP 22mmHg. Most recent CT Chest 7/15/2022 noted ILD. She presented with c/o of diarrhea and malaise for 2 days after recent COVID infection. She presented mildly tachycardic. She was treated with 1L IVF but desatted on her baseline 4 L O2 down in the 70's % range and placed on high flow NC 10L.  She was tx with dose of Lasix and had rapid improvement. Chest CT noted severe pulmonary fibrosis. EKG noted NSR; LAE; RSR or QR pattern in V1; T wave abnormality inferior/anterior leads consider ischemia 89 bpm (compared to 9/22 EKG T wave change new)  LABS: , K 4.2, Cl 102, CO2 32, BUN/Cr 10/<0.5, Ca 10.2, BNP 1,234, ALT 13, AST 33, H/H 10.4/33.9 and She <0.01. She was admitted for further treatment. She had 2 runs of SVT around 160bpm on tele today (8 and 12 beats)  Diagnosis of PSVT in older female with severe pulm fibrosis and chronic resp failure. Recs:  Arrhythmias not unexpected in chronic resp failure patient. Will switch from nadolol to metoprolol succinated 25mg daily. Continue to follow telemetry. Check K+ and Mg+ and replete if necessary. No need for cardiac testing at this time.      Patient Active Problem List   Diagnosis    Chest pain    HTN (hypertension)    Hyperlipidemia with target LDL less than 130    Hypokalemia    Insomnia    Trochanteric bursitis of both hips    Migraine    Syncope    Acute blood loss anemia    Fatigue    Hypothyroidism    Mild single current episode of major depressive disorder (HCC)    Anxiety    Pneumonia of both lower lobes due to infectious organism    A-fib ([address]    Atypical pneumonia    Acute bronchitis with bronchospasm    Acute on chronic diastolic CHF (congestive heart failure) (HCC)    Class 2 obesity with body mass index (BMI) of 39.0 to 39.9 in adult    Abnormal chest CT    Acute diastolic heart failure (HCC)    ILD (interstitial lung disease) (HCC)    Acute on chronic respiratory failure with hypoxia (HCC)    Restrictive lung disease    Abnormal CT of the chest    SOB (shortness of breath)    Acute cystitis without hematuria    Shortness of breath    Acute on chronic respiratory failure with hypoxemia (HCC)    Cryptogenic organizing pneumonia (HCC)    Pneumothorax of left lung after biopsy    COPD (chronic obstructive pulmonary disease) (HCC)    Type 2 diabetes mellitus without complication (HCC)    Hyponatremia    Numbness and tingling in left hand    Ulnar neuropathy at elbow of left upper extremity    Acute congestive heart failure (HCC)    Left wrist pain    Left wrist tendinitis    GERD (gastroesophageal reflux disease)    Toxic metabolic encephalopathy    VIRGINIE (acute kidney injury) ([address]    Lumbar radiculopathy    Acute respiratory failure with hypoxia (HCC)    Rib pain on left side    Elevated brain natriuretic peptide (BNP) level    Fracture of multiple ribs of left side    Interstitial lung disease (HCC)    Hypomagnesemia    Depression    Chronic diastolic congestive heart failure (HCC)    Burst fracture of lumbar vertebra (HCC)    H/O Spinal surgery    Severe malnutrition ([address]    Pneumonia due to COVID-19 virus    Primary spontaneous pneumothorax    Secondary spontaneous pneumothorax    COVID-19    Acute on chronic respiratory failure ([address]    Diarrhea    Pulmonary fibrosis ([address]       Thank you for allowing to us to participate in the care or Armin Obando. Further evaluation will be based upon the patient's clinical course and testing results.

## 2022-10-21 NOTE — FLOWSHEET NOTE
10/21/22 0030   Vital Signs   Temp 97.8 °F (36.6 °C)   Temp Source Oral   Heart Rate (!) 104   Heart Rate Source Monitor   Resp 20   BP (!) 150/94   BP Location Right upper arm   MAP (Calculated) 112.67   Level of Consciousness 0   MEWS Score 2   Oxygen Therapy   SpO2 94 %   Pulse Oximeter Device Mode Intermittent   Pulse Oximeter Device Location Finger   O2 Device Nasal cannula   O2 Flow Rate (L/min) 4 L/min     Pt awake in bed. VS as shown above. Pt denies any further needs at this time. Call light in reach and bed in lowest position.

## 2022-10-21 NOTE — PROGRESS NOTES
Consult has been called to Dr. Jose Cervantes on 10/21/22. Spoke with Dolores Wood.  10:55 AM    Delon Car  10/21/2022

## 2022-10-21 NOTE — CONSULTS
Reason for referral and CC: Hypoxia and diarrhea    HISTORY OF PRESENT ILLNESS: 70 yo  with pulmonary fibrosis and chronic hypoxia presented to the ED with c/o 3 days of diarrhea. While in the ED she received IVF. SHe became SOB and required 10L O2 and was admitted to the hospital. Lomotil has helped the diarrhea. Oxygenation returned to baseline as well. Denies increase in SOB from baseline. No abd pain or blood in stool    Past Medical History:   Diagnosis Date    A-fib ([facility] [address]     Anxiety     Cryptogenic organizing pneumonia ([facility] [address]     Depression     GERD (gastroesophageal reflux disease)     Hyperlipidemia     On home oxygen therapy     uses O2 3L prn    Rash     Thyroid disease     hypothyroidism     Past Surgical History:   Procedure Laterality Date    ARM SURGERY Left 3/2/2020    LEFT ULNAR NERVE DECOMPRESSION AT THE ELBOW performed by Pratik Cool MD at [facility] N/A 6/27/2019    EBUS WF W/ANES.  performed by Sabrina Felix MD at [address] [place] [address]  6/27/2019    BRONCHOSCOPY/TRANSBRONCHIAL NEEDLE BIOPSY performed by Sabrina Felix MD at [address] [place] [address]  6/27/2019    BRONCHOSCOPY/TRANSBRONCHIAL LUNG BIOPSY performed by Sabrina Felix MD at [address] [place] [address]  6/27/2019    BRONCHOSCOPY ALVEOLAR LAVAGE performed by Sabrina Felix MD at [address] [place] [address]  07/10/2019    BRONCHOSCOPY N/A 7/10/2019    BRONCHOSCOPY ALVEOLAR LAVAGE performed by Eva Luo MD at [address] [place] [address] Bilateral 08/06/2019    BRONCHOSCOPY N/A 8/6/2019    BRONCHOSCOPY ALVEOLAR LAVAGE performed by Sawyer Nuñez MD at [address] [place] [address] N/A 12/24/2019    BRONCHOSCOPY ALVEOLAR LAVAGE performed by Cameron Cook MD at [facility] 8/25/2022    COLONOSCOPY POLYPECTOMY SNARE/COLD BIOPSY performed by Vesta Mao MD at [facility]  9/15/2022    CT GUIDED CHEST TUBE 9/15/2022 [facility] CT SCAN    HYSTERECTOMY, TOTAL ABDOMINAL (CERVIX REMOVED)      partial     Family History  family history includes Asthma in her sister; Diabetes in her mother; High Blood Pressure in her mother; Other in her father. Social History:  reports that she has never smoked. She has never used smokeless tobacco.   reports no history of alcohol use. ALLERGIES:  Patient is allergic to penicillins, cefuroxime, doxycycline, imitrex [sumatriptan], meperidine, other, sulfa antibiotics, keflex [cephalexin], and tape [adhesive tape].   Continuous Infusions:   dextrose      sodium chloride       Scheduled Meds:   metoprolol succinate  25 mg Oral Daily    sertraline  200 mg Oral Daily    mirtazapine  30 mg Oral Nightly    levothyroxine  125 mcg Oral Daily    ipratropium-albuterol  1 ampule Inhalation BID    enoxaparin  30 mg SubCUTAneous Daily    [Held by provider] Pirfenidone  801 mg Oral TID    pantoprazole  40 mg Oral QAM AC    atorvastatin  40 mg Oral Daily    sodium chloride flush  5-40 mL IntraVENous 2 times per day    insulin lispro  0-8 Units SubCUTAneous TID WC    insulin lispro  0-4 Units SubCUTAneous Nightly     PRN Meds:  hydrOXYzine HCl, benzonatate, polyethylene glycol, potassium chloride **OR** potassium alternative oral replacement **OR** potassium chloride, magnesium sulfate, diphenoxylate-atropine, ipratropium-albuterol, glucose, dextrose bolus **OR** dextrose bolus, glucagon (rDNA), dextrose, sodium chloride flush, sodium chloride, ondansetron **OR** ondansetron, acetaminophen **OR** acetaminophen    REVIEW OF SYSTEMS:  Constitutional: Negative for fever  HENT: Negative for sore throat  Eyes: Negative for redness   Respiratory: Negative for dyspnea, cough  Cardiovascular: Negative for chest pain  Gastrointestinal: Negative for vomiting, +diarrhea   Genitourinary: Negative for hematuria   Musculoskeletal: Negative for arthralgias   Skin: Negative for rash  Neurological: Negative for syncope  Hematological: Negative for adenopathy  Psychiatric/Behavorial: Negative for anxiety    PHYSICAL EXAM: /87   Pulse 100   Temp 97.5 °F (36.4 °C) (Oral)   Resp 18   Ht 5' 3\" (1.6 m)   Wt 106 lb 1 oz (48.1 kg)   SpO2 96%   BMI 18.79 kg/m²  on 3L  Constitutional:  No acute distress. Eyes: PERRL. Conjunctivae anicteric. ENT: Normal nose. Normal tongue. Neck:  Trachea is midline. No thyroid tenderness. Respiratory: No accessory muscle usage. No wheezes. + rales. No Rhonchi. Cardiovascular: Normal S1S2. No digit clubbing. No digit cyanosis. No LE edema. Gastrointestinal: No mass palpated. No tenderness palpated. No umbilical hernia. Lymphatic: No cervical or supraclavicular adenopathy. Skin: No rash on the exposed extremities. No Nodules or induration on exposed extremities. Psychiatric: No anxiety or Agitation. Alert and Oriented to person, place and time. CBC:   Recent Labs     10/19/22  1621 10/20/22  0447   WBC 8.6 8.0   HGB 10.4* 9.9*   HCT 33.9* 31.1*   MCV 80.7 79.2*    255       BMP:   Recent Labs     10/19/22  1621 10/20/22  0447    140   K 4.2 3.5    101   CO2 32 26   BUN 10 9   CREATININE <0.5* <0.5*          Recent Labs     10/19/22  1621   AST 33   ALT 13   LIPASE 93.0*   BILITOT 0.5   ALKPHOS 136*       No results for input(s): PROTIME, INR in the last 72 hours. Recent Labs     10/19/22  2030   COLORU Yellow   PHUR 6.5   WBCUA None seen   RBCUA None seen   MUCUS Rare*   CLARITYU Clear   SPECGRAV 1.010   LEUKOCYTESUR Negative   UROBILINOGEN 0.2   BILIRUBINUR Negative   BLOODU Negative   GLUCOSEU Negative       No results for input(s): PHART, RER3GBM, PO2ART in the last 72 hours. Chest imaging was reviewed by me and showed CTPA: unchanged  1. No evidence of pulmonary embolism. 2. Again seen extensive interstitial pulmonary fibrosis.   An underlying   superimposed acute pulmonary process is not readily appreciated but difficult   to exclude. ASSESSMENT:   Diarrhea: possible viral vs food reaction vs side effect of Ofev which she had been tolerating well  Acute on chronic hypoxic respiratory failure  - back to baseline  Pulmonary fibrosis: see clinic notes for specifics    PLAN:  Hold Ofev  Supplemental oxygen to maintain SaO2 >92%; wean as tolerated    IVF stopped in ED  Home inhaler regimen  After d/c home could resume pirfenidone daily for a week and then BID for a week and then back to TID. Ok to use lomotil PRN ar home as well. Take Ofev with meals.     Thank you Earnestine Hatchet, MD for this consult

## 2022-10-21 NOTE — PROGRESS NOTES
Shift report given to nurse Ilda Levels at bedside. Patient care handed off in stable condition at this time.  Salvador Santana RN

## 2022-10-21 NOTE — PLAN OF CARE
Problem: Discharge Planning  Goal: Discharge to home or other facility with appropriate resources  10/21/2022 0013 by Chelo Haynes RN  Outcome: Progressing  10/20/2022 1831 by Mario Perez RN  Outcome: Progressing     Problem: Safety - Adult  Goal: Free from fall injury  10/21/2022 0013 by Chelo Haynes RN  Outcome: Progressing  10/20/2022 1831 by Mario Perez RN  Outcome: Progressing     Problem: Skin/Tissue Integrity  Goal: Absence of new skin breakdown  Description: 1. Monitor for areas of redness and/or skin breakdown  2. Assess vascular access sites hourly  3. Every 4-6 hours minimum:  Change oxygen saturation probe site  4. Every 4-6 hours:  If on nasal continuous positive airway pressure, respiratory therapy assess nares and determine need for appliance change or resting period.   10/21/2022 0013 by Chelo Haynes RN  Outcome: Progressing  10/20/2022 1831 by Mario Perez RN  Outcome: Progressing

## 2022-10-21 NOTE — PROGRESS NOTES
Patient has had two episodes on SVT 12 beats and 8 beats. FRANCISCA Smiley notified. STAT EKG ordered along with Cardiology consult.  Aleshia To RN

## 2022-10-21 NOTE — PROGRESS NOTES
will start Lomotil for symptoms  -Monitor electrolytes and replace as needed  -No recent abx use per pt   -Ct abd neg     #Acute on chronic respiratory failure with hypoxia  #Interstitial lung disease  - was needing 10 L on admission but no clear reason and quickly improved to home level of 4 L. Ct chest neg for infiltrates or PE  - resume home inhalers, Firenidone - may be at lower dose     #Paroxysmal atrial fibrillation  -2 runs of SVT today  -Check EKG  -no BMP this morning, ordered to check [facility]  -cardiology consulted     #Chronic diastolic CHF  last echo as above, from 5/6/22 EF 55%  -BNP mildly elevated  - pt was taken of diuretics recently for weight loss   - monitor for fluid overload and resume lasix as needed only      #DM type 2  -Has been taken off diuretics and diabetic meds for progressive weight loss   -Given steroids in ER   -SSI  -continue to monitor BG     #HTN  -BP stable  -on nadolol     #HLD  -continue statin     #Hypothyroidism  -continue synthroid     #Iron Deficiency Anemia  - Recent Hemorrhoidal bleed with colonoscopy neg  - hb at 10  - continue PO iron  - Hgb stable     #Depression  #Anxiety  -continue zoloft  -prn ativan    DVT Prophylaxis: Lovenox  Diet: ADULT DIET;  Regular; 3 carb choices (45 gm/meal)  ADULT ORAL NUTRITION SUPPLEMENT; Breakfast, Dinner; Diabetic Oral Supplement  Code Status: Full Code    Yasmani Red PA-C  10/21/2022 11:09 AM

## 2022-10-21 NOTE — PROGRESS NOTES
Pt awake in bed. Complaining of headache and nausea. PRN tylenol and Zofran given at this time. Pt denies any further needs at this time. Call light in reach and bed in lowest position.

## 2022-10-21 NOTE — PROGRESS NOTES
Shift assessment complete. Patient resting in bed. Medications given without complication. Patient had no complaints of pain or discomfort.  Chitra Cee RN

## 2022-10-21 NOTE — CARE COORDINATION
INTERDISCIPLINARY PLAN OF CARE CONFERENCE    Date/Time: 10/21/2022 1:56 PM  Completed by: Leif Tony RN, Case Management      Patient Name:  Azalia Obando  YOB: 1955  Admitting Diagnosis: Acute on chronic respiratory failure ([facility] [address] [J96.20]  Acute on chronic respiratory failure with hypoxia ([address] [J96.21]  Diarrhea, unspecified type [R19.7]     Admit Date/Time:  10/19/2022  3:53 PM    Chart reviewed. Interdisciplinary team contacted or reviewed plan related to patient progress and discharge plans. Disciplines included Case Management, Nursing, and Dietitian. Current Status: IP 10/19/2022  PT/OT recommendation for discharge plan of care: NA    Expected D/C Disposition:  Home    Discharge Plan Comments: Lives w/spouse. IPTA. Plans to return home. +Active with [facility] (SN/PT/OT/ST). +active w/ [facility] for home O2. Cm will follow and assess for new needs. Home O2 in place on admit: Yes  Pt informed of need to bring portable home O2 tank on day of discharge for nursing to connect prior to leaving:  Yes  Verbalized agreement/Understanding:   Yes

## 2022-10-21 NOTE — PROGRESS NOTES
Pt awake in bed. Assessment completed and medications given per MAR. A&Ox4. VS as charted in flowsheet. Pt denies any further needs at this time. Call light in reach and bed in lowest position.

## 2022-10-21 NOTE — PROGRESS NOTES
RT Inhaler-Nebulizer Bronchodilator Protocol Note    There is a bronchodilator order in the chart from a provider indicating to follow the RT Bronchodilator Protocol and there is an Initiate RT Inhaler-Nebulizer Bronchodilator Protocol order as well (see protocol at bottom of note). CXR Findings:  XR CHEST PORTABLE    Result Date: 10/19/2022  1. Severe radiographic findings typical of pulmonary fibrosis. Correlate with clinical history. 2. No superimposed pulmonary infiltrate evident radiographically. 3. Calcific atherosclerosis aorta. 4. Cardiomegaly. The findings from the last RT Protocol Assessment were as follows:   History Pulmonary Disease: Chronic pulmonary disease  Respiratory Pattern: Dyspnea on exertion or RR 21-25 bpm  Breath Sounds: Slightly diminished and/or crackles  Cough: Strong, spontaneous, non-productive  Indication for Bronchodilator Therapy: Decreased or absent breath sounds  Bronchodilator Assessment Score: 6    Aerosolized bronchodilator medication orders have been revised according to the RT Inhaler-Nebulizer Bronchodilator Protocol below. Respiratory Therapist to perform RT Therapy Protocol Assessment initially then follow the protocol. Repeat RT Therapy Protocol Assessment PRN for score 0-3 or on second treatment, BID, and PRN for scores above 3. No Indications - adjust the frequency to every 6 hours PRN wheezing or bronchospasm, if no treatments needed after 48 hours then discontinue using Per Protocol order mode. If indication present, adjust the RT bronchodilator orders based on the Bronchodilator Assessment Score as indicated below.   Use Inhaler orders unless patient has one or more of the following: on home nebulizer, not able to hold breath for 10 seconds, is not alert and oriented, cannot activate and use MDI correctly, or respiratory rate 25 breaths per minute or more, then use the equivalent nebulizer order(s) with same Frequency and PRN reasons based on the score.  If a patient is on this medication at home then do not decrease Frequency below that used at home. 0-3 - enter or revise RT bronchodilator order(s) to equivalent RT Bronchodilator order with Frequency of every 4 hours PRN for wheezing or increased work of breathing using Per Protocol order mode. 4-6 - enter or revise RT Bronchodilator order(s) to two equivalent RT bronchodilator orders with one order with BID Frequency and one order with Frequency of every 4 hours PRN wheezing or increased work of breathing using Per Protocol order mode. 7-10 - enter or revise RT Bronchodilator order(s) to two equivalent RT bronchodilator orders with one order with TID Frequency and one order with Frequency of every 4 hours PRN wheezing or increased work of breathing using Per Protocol order mode. 11-13 - enter or revise RT Bronchodilator order(s) to one equivalent RT bronchodilator order with QID Frequency and an Albuterol order with Frequency of every 4 hours PRN wheezing or increased work of breathing using Per Protocol order mode. Greater than 13 - enter or revise RT Bronchodilator order(s) to one equivalent RT bronchodilator order with every 4 hours Frequency and an Albuterol order with Frequency of every 2 hours PRN wheezing or increased work of breathing using Per Protocol order mode.          Electronically signed by Yas Robles RCP on 10/21/2022 at 7:24 PM

## 2022-10-22 VITALS
HEIGHT: 63 IN | TEMPERATURE: 97.1 F | DIASTOLIC BLOOD PRESSURE: 85 MMHG | RESPIRATION RATE: 18 BRPM | BODY MASS INDEX: 18.2 KG/M2 | OXYGEN SATURATION: 93 % | WEIGHT: 102.73 LBS | HEART RATE: 96 BPM | SYSTOLIC BLOOD PRESSURE: 138 MMHG

## 2022-10-22 PROBLEM — I47.1 SVT (SUPRAVENTRICULAR TACHYCARDIA) (HCC): Status: ACTIVE | Noted: 2022-10-22

## 2022-10-22 PROBLEM — I47.10 SVT (SUPRAVENTRICULAR TACHYCARDIA): Status: ACTIVE | Noted: 2022-10-22

## 2022-10-22 LAB
ANION GAP SERPL CALCULATED.3IONS-SCNC: 9 MMOL/L (ref 3–16)
BASOPHILS ABSOLUTE: 0.1 K/UL (ref 0–0.2)
BASOPHILS RELATIVE PERCENT: 0.8 %
BUN BLDV-MCNC: 19 MG/DL (ref 7–20)
CALCIUM SERPL-MCNC: 9 MG/DL (ref 8.3–10.6)
CHLORIDE BLD-SCNC: 102 MMOL/L (ref 99–110)
CO2: 26 MMOL/L (ref 21–32)
CREAT SERPL-MCNC: 0.6 MG/DL (ref 0.6–1.2)
EOSINOPHILS ABSOLUTE: 0.9 K/UL (ref 0–0.6)
EOSINOPHILS RELATIVE PERCENT: 7.6 %
GFR SERPL CREATININE-BSD FRML MDRD: >60 ML/MIN/{1.73_M2}
GLUCOSE BLD-MCNC: 109 MG/DL (ref 70–99)
GLUCOSE BLD-MCNC: 125 MG/DL (ref 70–99)
GLUCOSE BLD-MCNC: 134 MG/DL (ref 70–99)
HCT VFR BLD CALC: 33.2 % (ref 36–48)
HEMOGLOBIN: 10.3 G/DL (ref 12–16)
LYMPHOCYTES ABSOLUTE: 2.6 K/UL (ref 1–5.1)
LYMPHOCYTES RELATIVE PERCENT: 22.9 %
MAGNESIUM: 2.2 MG/DL (ref 1.8–2.4)
MCH RBC QN AUTO: 24.2 PG (ref 26–34)
MCHC RBC AUTO-ENTMCNC: 31.1 G/DL (ref 31–36)
MCV RBC AUTO: 77.9 FL (ref 80–100)
MONOCYTES ABSOLUTE: 0.8 K/UL (ref 0–1.3)
MONOCYTES RELATIVE PERCENT: 7 %
NEUTROPHILS ABSOLUTE: 7 K/UL (ref 1.7–7.7)
NEUTROPHILS RELATIVE PERCENT: 61.7 %
PDW BLD-RTO: 15.8 % (ref 12.4–15.4)
PERFORMED ON: ABNORMAL
PERFORMED ON: ABNORMAL
PLATELET # BLD: 267 K/UL (ref 135–450)
PMV BLD AUTO: 7.4 FL (ref 5–10.5)
POTASSIUM REFLEX MAGNESIUM: 3.9 MMOL/L (ref 3.5–5.1)
RBC # BLD: 4.26 M/UL (ref 4–5.2)
SODIUM BLD-SCNC: 137 MMOL/L (ref 136–145)
WBC # BLD: 11.4 K/UL (ref 4–11)

## 2022-10-22 PROCEDURE — 6370000000 HC RX 637 (ALT 250 FOR IP): Performed by: INTERNAL MEDICINE

## 2022-10-22 PROCEDURE — 94640 AIRWAY INHALATION TREATMENT: CPT

## 2022-10-22 PROCEDURE — 99239 HOSP IP/OBS DSCHRG MGMT >30: CPT | Performed by: INTERNAL MEDICINE

## 2022-10-22 PROCEDURE — 36415 COLL VENOUS BLD VENIPUNCTURE: CPT

## 2022-10-22 PROCEDURE — 2580000003 HC RX 258: Performed by: INTERNAL MEDICINE

## 2022-10-22 PROCEDURE — 99232 SBSQ HOSP IP/OBS MODERATE 35: CPT | Performed by: INTERNAL MEDICINE

## 2022-10-22 PROCEDURE — 83735 ASSAY OF MAGNESIUM: CPT

## 2022-10-22 PROCEDURE — 94761 N-INVAS EAR/PLS OXIMETRY MLT: CPT

## 2022-10-22 PROCEDURE — 85025 COMPLETE CBC W/AUTO DIFF WBC: CPT

## 2022-10-22 PROCEDURE — 80048 BASIC METABOLIC PNL TOTAL CA: CPT

## 2022-10-22 PROCEDURE — 6360000002 HC RX W HCPCS: Performed by: INTERNAL MEDICINE

## 2022-10-22 PROCEDURE — 2700000000 HC OXYGEN THERAPY PER DAY

## 2022-10-22 RX ORDER — METOPROLOL SUCCINATE 25 MG/1
25 TABLET, EXTENDED RELEASE ORAL DAILY
Qty: 30 TABLET | Refills: 3 | Status: ON HOLD | OUTPATIENT
Start: 2022-10-23 | End: 2022-11-30 | Stop reason: ALTCHOICE

## 2022-10-22 RX ORDER — PIRFENIDONE 267 MG/1
TABLET, FILM COATED ORAL
Qty: 270 TABLET | Refills: 3 | Status: ON HOLD
Start: 2022-10-22

## 2022-10-22 RX ADMIN — LEVOTHYROXINE SODIUM 125 MCG: 125 TABLET ORAL at 05:51

## 2022-10-22 RX ADMIN — SERTRALINE 200 MG: 100 TABLET, FILM COATED ORAL at 09:25

## 2022-10-22 RX ADMIN — ACETAMINOPHEN 650 MG: 325 TABLET ORAL at 02:14

## 2022-10-22 RX ADMIN — ENOXAPARIN SODIUM 30 MG: 100 INJECTION SUBCUTANEOUS at 09:26

## 2022-10-22 RX ADMIN — METOPROLOL SUCCINATE 25 MG: 25 TABLET, FILM COATED, EXTENDED RELEASE ORAL at 09:25

## 2022-10-22 RX ADMIN — ATORVASTATIN CALCIUM 40 MG: 40 TABLET, FILM COATED ORAL at 09:25

## 2022-10-22 RX ADMIN — PANTOPRAZOLE SODIUM 40 MG: 40 TABLET, DELAYED RELEASE ORAL at 05:51

## 2022-10-22 RX ADMIN — IPRATROPIUM BROMIDE AND ALBUTEROL SULFATE 1 AMPULE: 2.5; .5 SOLUTION RESPIRATORY (INHALATION) at 08:25

## 2022-10-22 RX ADMIN — SODIUM CHLORIDE, PRESERVATIVE FREE 10 ML: 5 INJECTION INTRAVENOUS at 09:26

## 2022-10-22 ASSESSMENT — PAIN SCALES - GENERAL: PAINLEVEL_OUTOF10: 4

## 2022-10-22 ASSESSMENT — PAIN DESCRIPTION - LOCATION: LOCATION: HEAD

## 2022-10-22 ASSESSMENT — PAIN DESCRIPTION - DESCRIPTORS: DESCRIPTORS: ACHING

## 2022-10-22 NOTE — PROGRESS NOTES
3. 4* 3.9    103 102   CO2 26 22 26   BUN 9 18 19   CREATININE <0.5* <0.5* 0.6     Chest imaging was reviewed by me and showed CTPA: unchanged  1. No evidence of pulmonary embolism. 2. Again seen extensive interstitial pulmonary fibrosis. An underlying   superimposed acute pulmonary process is not readily appreciated but difficult   to exclude. ASSESSMENT:   Diarrhea: possible viral vs food reaction vs side effect of Ofev which she had been tolerating well  Acute on chronic hypoxic respiratory failure  - back to baseline  Pulmonary fibrosis: see clinic notes for specifics     PLAN:  Supplemental oxygen to maintain SaO2 >92%; wean as tolerated    IVF stopped in ED  Home inhaler regimen  After d/c home could resume pirfenidone daily for a week and then BID for a week and then back to TID. Ok to use lomotil PRN ar home as well.

## 2022-10-22 NOTE — PLAN OF CARE
Problem: Discharge Planning  Goal: Discharge to home or other facility with appropriate resources  10/22/2022 0938 by Kiki Mccollum RN  Outcome: Progressing  10/21/2022 2214 by Kaiser Capps RN  Outcome: Progressing     Problem: Safety - Adult  Goal: Free from fall injury  10/22/2022 0938 by Kiki Mccollum RN  Outcome: Progressing  10/21/2022 2214 by Kaiser Capps RN  Outcome: Progressing     Problem: Skin/Tissue Integrity  Goal: Absence of new skin breakdown  Description: 1. Monitor for areas of redness and/or skin breakdown  2. Assess vascular access sites hourly  3. Every 4-6 hours minimum:  Change oxygen saturation probe site  4. Every 4-6 hours:  If on nasal continuous positive airway pressure, respiratory therapy assess nares and determine need for appliance change or resting period.   10/22/2022 2296 by Kiki Mccollum RN  Outcome: Progressing  10/21/2022 2214 by Kaiser Capps RN  Outcome: Progressing     Problem: Pain  Goal: Verbalizes/displays adequate comfort level or baseline comfort level  10/22/2022 0938 by Kiki Mccollum RN  Outcome: Progressing  10/21/2022 2214 by Kaiser Capps RN  Outcome: Progressing

## 2022-10-22 NOTE — PROGRESS NOTES
[facility]   Progress Note  Cardiology    CC: diarrhea    HPI: feels better. No cp or palps. Chronic SOB near baseline     Past Medical History   has a past medical history of A-fib ([facility] [address] Anxiety, Cryptogenic organizing pneumonia ([facility] [address] Depression, GERD (gastroesophageal reflux disease), Hyperlipidemia, On home oxygen therapy, Rash, and Thyroid disease. Past Surgical History   has a past surgical history that includes Cholecystectomy; bronchoscopy (N/A, 6/27/2019); bronchoscopy (6/27/2019); bronchoscopy (6/27/2019); bronchoscopy (6/27/2019); bronchoscopy (07/10/2019); bronchoscopy (N/A, 7/10/2019); Hysterectomy, total abdominal; bronchoscopy (Bilateral, 08/06/2019); bronchoscopy (N/A, 8/6/2019); bronchoscopy (N/A, 12/24/2019); Arm Surgery (Left, 3/2/2020); Colonoscopy (N/A, 8/25/2022); and CT GUIDED CHEST TUBE (9/15/2022). Social History   reports that she has never smoked. She has never used smokeless tobacco. She reports that she does not drink alcohol and does not use drugs. Family History  family history includes Asthma in her sister; Diabetes in her mother; High Blood Pressure in her mother; Other in her father. Medications  Prior to Admission medications    Medication Sig Start Date End Date Taking? Authorizing Provider   Pirfenidone 267 MG TABS pirfenidone daily for a week and then BID for a week and then back to TID. 10/22/22  Yes Jacquie Tee MD   metoprolol succinate (TOPROL XL) 25 MG extended release tablet Take 1 tablet by mouth daily 10/23/22  Yes Jacquie Tee MD   LORazepam (ATIVAN) 0.5 MG tablet Take 0.5 mg by mouth every 6 hours as needed for Anxiety. Yes Historical Provider, MD   traMADol (ULTRAM) 50 MG tablet Take 50 mg by mouth every 6 hours as needed for Pain.    Yes Historical Provider, MD   omeprazole (PRILOSEC) 40 MG delayed release capsule Take 1 capsule by mouth every morning (before breakfast) 10/3/22   Gordy Velazquez MD   magnesium oxide (MAG-OX) 400 (240 Mg) MG tablet Take 1 tablet by mouth daily  Patient not taking: Reported on 10/20/2022 9/2/22   Mago Harris MD   potassium chloride (KLOR-CON) 10 MEQ extended release tablet Take 1 tablet by mouth 2 times daily  Patient not taking: Reported on 10/20/2022 9/1/22   Mago Harris MD   polycarbophil (FIBERCON) 625 MG tablet Take 1 tablet by mouth at bedtime  Patient not taking: Reported on 10/20/2022 9/1/22   Mago Harris MD   albuterol sulfate HFA (PROVENTIL;VENTOLIN;PROAIR) 108 (90 Base) MCG/ACT inhaler INHALE 2 PUFFS INTO THE LUNGS EVERY 6 HOURS AS NEEDED FOR WHEEZING 7/13/22   Ani Kong MD   atorvastatin (LIPITOR) 40 MG tablet Take 1 tablet by mouth daily 5/27/22   Zina Potter MD   levothyroxine (SYNTHROID) 125 MCG tablet Take 1 tablet by mouth Daily TAKE 1 TABLET BY MOUTH EVERY DAY 5/27/22   Zina Potter MD   sertraline (ZOLOFT) 100 MG tablet Take 2 tablets by mouth daily 5/27/22   Zina Potter MD   mirtazapine (REMERON) 30 MG tablet Take 1 tablet by mouth nightly 5/27/22   Zina Potter MD   loratadine-pseudoephedrine ([employer] ALLERGY RELIEF-D12) 5-120 MG per extended release tablet TAKE 1 TABLET BY MOUTH TWICE A DAY 5/27/22   Zina Potter MD   benzonatate (TESSALON) 200 MG capsule TAKE 1 CAPSULE BY MOUTH 3 TIMES A DAY AS NEEDED FOR COUGH 5/27/22   Zina Potter MD   empagliflozin (JARDIANCE) 25 MG tablet Take 1 tablet by mouth daily 5/27/22 9/1/22  Zina Potter MD   SITagliptin (JANUVIA) 100 MG tablet TAKE 1 TABLET BY MOUTH EVERY DAY 5/27/22 9/1/22  Zina Potter MD   hydrOXYzine (ATARAX) 10 MG tablet Take 1 tablet by mouth every 8 hours as needed for Itching TAKE 1 TO 3 TABLETS BY MOUTH 3 TIMES DAILY AS NEEDED FOR ITCHING 5/8/22   PANDA Hodge - CNP   furosemide (LASIX) 40 MG tablet Take 1 tablet by mouth daily 5/9/22 9/1/22  PANDA Hodge - CNP   acetaminophen (TYLENOL) 325 MG tablet  4/21/22   Historical Provider, MD   guaiFENesin [facility]) 600 MG extended release tablet Take 1,200 mg by mouth every morning    Historical Provider, MD   albuterol (PROVENTIL) (2.5 MG/3ML) 0.083% nebulizer solution INHALE THE CONTENTS OF 1 VIAL VIA NEBULIZER EVERY 6 HOURS AS NEEDED FOR WHEEZING 2/7/22   eBth Parker MD   Blood Glucose Monitoring Suppl (TRUE METRIX METER) PUJA Test daily and as needed 9/10/21   Bteh Parker MD   blood glucose test strips (ASCENSIA AUTODISC VI;ONE TOUCH ULTRA TEST VI) strip 1 each by In Vitro route daily As needed. 9/10/21   Beth Parker MD   TRUEplus Lancets 33G MISC Use daily 9/10/21   Beth Parker MD   Blood Glucose Calibration (TRUE METRIX LEVEL 1) Low SOLN use as needed 9/9/21   Beth Parker MD   INSULIN SYRINGE 1CC/29G 29G X 1/2\" 1 ML MISC 1 each by Does not apply route 2 times daily 3/6/20   Beth Parker MD   OXYGEN Inhale 3.5 L into the lungs 7/14/19   Historical Provider, MD       Allergies  Penicillins, Cefuroxime, Doxycycline, Imitrex [sumatriptan], Meperidine, Other, Sulfa antibiotics, Keflex [cephalexin], and Tape [adhesive tape]    Review of Systems:   Reviewed. No changes except as noted in HPI and A/P      Lab Results   Component Value Date    WBC 11.4 (H) 10/22/2022    HGB 10.3 (L) 10/22/2022    HCT 33.2 (L) 10/22/2022    MCV 77.9 (L) 10/22/2022     10/22/2022     Lab Results   Component Value Date    CREATININE 0.6 10/22/2022    BUN 19 10/22/2022     10/22/2022    K 3.9 10/22/2022     10/22/2022    CO2 26 10/22/2022     (H) 06/16/2014     Lab Results   Component Value Date    INR 1.21 (H) 09/15/2022    PROTIME 15.2 (H) 09/15/2022       I reviewed EKGs and radiology imaging. Pertinent findings and changes as described in assessment below.       Physical Examination:    /85   Pulse 96   Temp 97.1 °F (36.2 °C) (Axillary)   Resp 18   Ht 5' 3\" (1.6 m)   Wt 102 lb 11.8 oz (46.6 kg)   SpO2 93%   BMI 18.20 kg/m²      General Appearance:  Alert, no distress, appears stated age Head:  Normocephalic, without obvious abnormality, atraumatic   Eyes:  PERRL, conjunctiva/corneas clear         Nose: Nares normal, no drainage or sinus tenderness   Throat: Lips, mucosa, and tongue normal   Neck: Supple, symmetrical, trachea midline, no adenopathy         Lungs:   Clear to auscultation bilaterally    Chest Wall:  No tenderness or deformity   Heart:  Regular rate and rhythm, S1, S2 normal, no murmur, rub or gallop   Abdomen:   Soft, non tender, normal bowel sounds                Extremities: No cyanosis or edema   Pulses: 2+ and symmetric   Skin: Skin color, texture, turgor normal, no rashes    Pysch: Normal mood and affect   Neurologic: Normal gross motor and sensory exam.      Tele: NSR    Assessment:    Acute on chronic respiratory failure - improved  Diarrhea - improved  Pulmonary fibrosis - stable  PSVT (paroxysmal supraventricular tachycardia) - no recurrence since BBlk changed   CHF - stable  Covid + 9/9/2022    Plan  Cont metoprolol  No further cardiac testing  Monitor on pollo Herrera MD, 10/22/2022 11:03 AM

## 2022-10-22 NOTE — DISCHARGE INSTRUCTIONS
pirfenidone daily for a week and then BID for a week and then back to TID.   Ok to use imodium for diarrhea as needed

## 2022-10-22 NOTE — FLOWSHEET NOTE
10/22/22 0845   Vital Signs   Temp 97.1 °F (36.2 °C)   Temp Source Axillary   Heart Rate 96   Heart Rate Source Monitor   Resp 18   /85   BP Location Left upper arm   MAP (Calculated) 102.67   Level of Consciousness 0   MEWS Score 1   Pain Assessment   Pain Assessment None - Denies Pain   Oxygen Therapy   SpO2 93 %   O2 Device Nasal cannula   O2 Flow Rate (L/min) 4 L/min   Shift assessment compltete-pt up to beside commode-denies any further needs-call light in reach.

## 2022-10-22 NOTE — PROGRESS NOTES
Patient complains of headache. Prn medication administered for pain, see MAR. Snack provided per patient request. Call light in reach.

## 2022-10-22 NOTE — PROGRESS NOTES
Patient educated on discharge instructions as well as new medications use, dosage, administration and possible side effects. Patient verified knowledge. IV removed without difficulty and dry dressing in place. Telemetry monitor removed and returned to [facility]. Pt left facility in stable condition to Home with all of their personal belongings.

## 2022-10-22 NOTE — DISCHARGE SUMMARY
Name:  Brittany Arango  Room:  /0752-21  MRN:    [id number]    IM Discharge Summary    Discharging Physician:  Jayy Noriega MD    Admit: 10/19/2022    Discharge:      PCP      Shamika Leal MD    Diagnoses and hospital course  this Admission      #Diarrhea - likely related to her antifibrotic meds - Pirfenidone ,held this med  #C. difficile ruled out, GI pathogens neg  will start Lomotil for symptoms  -Monitored electrolytes and replaced as needed  -No recent abx use per pt   -Ct abd neg   - recommend to start once daily OFEV and increase slowly    #Acute on chronic respiratory failure with hypoxia  #Interstitial lung disease  - was needing 10 L on admission but no clear reason and quickly improved to home level of 4 L. Ct chest neg for infiltrates or PE  - resume home inhalers, Firenidone - may be at lower dose -    - pulm recommends to start pirfenidone daily for a week and then BID for a week and then back to TID. #Paroxysmal atrial fibrillation  -2 runs of SVT today,  -cardiology consulted and thought be related to resp issues  - changed nadolol to metoprolol and tolerating well      #Chronic diastolic CHF  last echo as above, from 5/6/22 EF 55%  -BNP mildly elevated  - pt was taken of diuretics recently for weight loss   - monitor for fluid overload and resume lasix as needed only      #DM type 2  -Has been taken off diuretics and diabetic meds for progressive weight loss   -Given steroids in ER   -SSI given   -continue to monitor BG- improved     #HTN  -BP stable   Changed to Metoprolol as above     #HLD  -continue statin     #Hypothyroidism  -continue synthroid     #Iron Deficiency Anemia  - Recent Hemorrhoidal bleed with colonoscopy neg  - hb at 10  - continue PO iron  - Hgb stable     #Depression  #Anxiety  -continue zoloft  -prn ativan    HPI 69 y.o. female who presented to the hospital with a chief complaint of diarrhea.   Patient has a history of multiple health issues including essential lung disease on chronic supplemental oxygen therapy. She presents with a 2 to 3-day history of progressive abdominal discomfort and diarrhea. She endorses multiple loose bowel movements every day and increased fatigue. She states her breathing is at her baseline and she requires 3 to 4 L of oxygen at all times. In emergency department during evaluation she desaturated requiring high flow nasal cannula. Physical Exam at Discharge:    General:  thin elderly female chronically ill appearing  Awake, alert and oriented. Appears to be not in any distress  Mucous Membranes:  Pink , anicteric  Neck: No JVD, no carotid bruit, no thyromegaly  Chest:  diminished in bases with scattered crackles ,  Cardiovascular:  RRR S1S2 heard, no murmurs or gallops  Abdomen:  Soft, undistended, non tender, no organomegaly, BS present  Extremities: No edema or cyanosis. Distal pulses well felt  Neurological : no focal deficits      Radiology (Please Review Full Report for Details)     CULTURES  Covid and flu not detected  Cdiff neg  GI path neg      RADIOLOGY  CT CHEST PULMONARY EMBOLISM W CONTRAST   Final Result   1. No evidence of pulmonary embolism. 2. Again seen extensive interstitial pulmonary fibrosis. An underlying   superimposed acute pulmonary process is not readily appreciated but difficult   to exclude. XR CHEST PORTABLE   Final Result   1. Severe radiographic findings typical of pulmonary fibrosis. Correlate   with clinical history. 2. No superimposed pulmonary infiltrate evident radiographically. 3. Calcific atherosclerosis aorta. 4. Cardiomegaly. CT ABDOMEN PELVIS W IV CONTRAST Additional Contrast? None   Final Result   No acute abnormality within the abdomen/pelvis. Stable fibrotic changes at   the lung bases.        Consults:    1.pulmonary   2. cardiology                  Recent Labs     10/20/22  0447 10/21/22  1143 10/22/22  0437   WBC 8.0 11.3* 11.4*   HGB 9.9* 10.5* 10.3*   HCT 31.1* 34.0* 33.2*   MCV 79.2* 78.1* 77.9*    301 267       Recent Labs     10/20/22  0447 10/21/22  1143 10/22/22  0437    137 137   K 3.5 3.4* 3.9    103 102   CO2 26 22 26   BUN 9 18 19   CREATININE <0.5* <0.5* 0.6       CBC:  Lab Results   Component Value Date/Time    WBC 11.4 10/22/2022 04:37 AM    HGB 10.3 10/22/2022 04:37 AM    HCT 33.2 10/22/2022 04:37 AM    MCV 77.9 10/22/2022 04:37 AM     10/22/2022 04:37 AM    NEUTOPHILPCT 61.7 10/22/2022 04:37 AM    LYMPHOPCT 22.9 10/22/2022 04:37 AM    MONOPCT 7.0 10/22/2022 04:37 AM    BASOPCT 0.8 10/22/2022 04:37 AM    NEUTROABS 7.0 10/22/2022 04:37 AM    LYMPHSABS 2.6 10/22/2022 04:37 AM    MONOSABS 0.8 10/22/2022 04:37 AM    EOSABS 0.9 10/22/2022 04:37 AM    BASOSABS 0.1 10/22/2022 04:37 AM     BNP:   Lab Results   Component Value Date/Time     10/22/2022 04:37 AM    K 3.9 10/22/2022 04:37 AM    CO2 26 10/22/2022 04:37 AM    BUN 19 10/22/2022 04:37 AM    CREATININE 0.6 10/22/2022 04:37 AM    CALCIUM 9.0 10/22/2022 04:37 AM                Medication List        START taking these medications      metoprolol succinate 25 MG extended release tablet  Commonly known as: TOPROL XL  Take 1 tablet by mouth daily  Start taking on: October 23, 2022            CHANGE how you take these medications      Pirfenidone 267 MG Tabs  pirfenidone daily for a week and then BID for a week and then back to TID.   What changed:   how much to take  how to take this  when to take this  additional instructions            CONTINUE taking these medications      acetaminophen 325 MG tablet  Commonly known as: TYLENOL     * albuterol (2.5 MG/3ML) 0.083% nebulizer solution  Commonly known as: PROVENTIL  INHALE THE CONTENTS OF 1 VIAL VIA NEBULIZER EVERY 6 HOURS AS NEEDED FOR WHEEZING     * albuterol sulfate  (90 Base) MCG/ACT inhaler  Commonly known as: PROVENTIL;VENTOLIN;PROAIR  INHALE 2 PUFFS INTO THE LUNGS EVERY 6 HOURS AS NEEDED FOR WHEEZING atorvastatin 40 MG tablet  Commonly known as: LIPITOR  Take 1 tablet by mouth daily     benzonatate 200 MG capsule  Commonly known as: TESSALON  TAKE 1 CAPSULE BY MOUTH 3 TIMES A DAY AS NEEDED FOR COUGH     blood glucose test strips strip  Commonly known as: ASCENSIA AUTODISC VI;ONE TOUCH ULTRA TEST VI  1 each by In Vitro route daily As needed. [employer] Allergy Relief-D12 5-120 MG per extended release tablet  Generic drug: loratadine-pseudoephedrine  TAKE 1 TABLET BY MOUTH TWICE A DAY     guaiFENesin 600 MG extended release tablet  Commonly known as: MUCINEX     hydrOXYzine HCl 10 MG tablet  Commonly known as: ATARAX  Take 1 tablet by mouth every 8 hours as needed for Itching TAKE 1 TO 3 TABLETS BY MOUTH 3 TIMES DAILY AS NEEDED FOR ITCHING     INSULIN SYRINGE 1CC/29G 29G X 1/2\" 1 ML Misc  1 each by Does not apply route 2 times daily     levothyroxine 125 MCG tablet  Commonly known as: SYNTHROID  Take 1 tablet by mouth Daily TAKE 1 TABLET BY MOUTH EVERY DAY     LORazepam 0.5 MG tablet  Commonly known as: ATIVAN     mirtazapine 30 MG tablet  Commonly known as: REMERON  Take 1 tablet by mouth nightly     omeprazole 40 MG delayed release capsule  Commonly known as: PRILOSEC  Take 1 capsule by mouth every morning (before breakfast)     OXYGEN     sertraline 100 MG tablet  Commonly known as: ZOLOFT  Take 2 tablets by mouth daily     traMADol 50 MG tablet  Commonly known as: ULTRAM     True Metrix Level 1 Low Soln  use as needed     True Metrix Meter Lindsay  Test daily and as needed     TRUEplus Lancets 33G Misc  Use daily           * This list has 2 medication(s) that are the same as other medications prescribed for you. Read the directions carefully, and ask your doctor or other care provider to review them with you.                 STOP taking these medications      empagliflozin 25 MG tablet  Commonly known as: JARDIANCE     furosemide 40 MG tablet  Commonly known as: LASIX     nadolol 20 MG tablet  Commonly known as: AMBROSE     SITagliptin 100 MG tablet  Commonly known as: Januvia     Vitamin D (Ergocalciferol) 08451 units Caps            ASK your doctor about these medications      magnesium oxide 400 (240 Mg) MG tablet  Commonly known as: MAG-OX  Take 1 tablet by mouth daily     polycarbophil 625 MG tablet  Commonly known as: FIBERCON  Take 1 tablet by mouth at bedtime     potassium chloride 10 MEQ extended release tablet  Commonly known as: KLOR-CON  Take 1 tablet by mouth 2 times daily               Where to Get Your Medications        These medications were sent to [facility]/pharmacy #[id number]- [place], [place] - [address] -  [phone] - F [phone]  [address] [address]      Phone: [phone]   metoprolol succinate 25 MG extended release tablet       Information about where to get these medications is not yet available    Ask your nurse or doctor about these medications  Pirfenidone 267 MG Tabs           Discharge Condition/Location: stable/home     Follow Up:    PCP in 1 weeks      Time Spent on discharge is more than 30 minutes in the examination, evaluation, counseling and review of medications and discharge plan.       Eber Wayne MD, 10/22/2022 9:56 AM

## 2022-10-22 NOTE — PLAN OF CARE
Problem: Discharge Planning  Goal: Discharge to home or other facility with appropriate resources  10/22/2022 1013 by Rosalie Ordonez RN  Outcome: Adequate for Discharge  10/22/2022 9561 by Rosalie Ordonez RN  Outcome: Progressing  10/21/2022 2214 by Muriel Nesbitt RN  Outcome: Progressing     Problem: Safety - Adult  Goal: Free from fall injury  10/22/2022 1013 by Rosalie Ordonez RN  Outcome: Adequate for Discharge  10/22/2022 2502 by Rosalie Ordonez RN  Outcome: Progressing  10/21/2022 2214 by Muriel Nesbitt RN  Outcome: Progressing     Problem: Skin/Tissue Integrity  Goal: Absence of new skin breakdown  Description: 1. Monitor for areas of redness and/or skin breakdown  2. Assess vascular access sites hourly  3. Every 4-6 hours minimum:  Change oxygen saturation probe site  4. Every 4-6 hours:  If on nasal continuous positive airway pressure, respiratory therapy assess nares and determine need for appliance change or resting period.   10/22/2022 1013 by Rosalie Ordonez RN  Outcome: Adequate for Discharge  10/22/2022 2670 by Rosalie Ordonez RN  Outcome: Progressing  10/21/2022 2214 by Muriel Nesbitt RN  Outcome: Progressing     Problem: Pain  Goal: Verbalizes/displays adequate comfort level or baseline comfort level  10/22/2022 1013 by Rosalie Ordonez RN  Outcome: Adequate for Discharge  10/22/2022 7170 by Rosalie Ordonez RN  Outcome: Progressing  10/21/2022 2214 by Muriel Nesbitt RN  Outcome: Progressing

## 2022-10-22 NOTE — CARE COORDINATION
DISCHARGE ORDER  Date/Time 10/22/2022 10:53 AM  Completed by: Merced Willoughby RN, Case Management    Patient Name: Jair Montes      : 1955  Admitting Diagnosis: Acute on chronic respiratory failure ([facility] [address] [J96.20]  Acute on chronic respiratory failure with hypoxia ([facility] [address] [J96.21]  Diarrhea, unspecified type [R19.7]    Admit order Date and Status: INPT 10/19/22  (verify MD's last order for status of admission)      Noted discharge order. If applicable PT/OT recommendation at Discharge: n/a  DME recommendation by PT/OT:n/a  Confirmed discharge plan  : Yes  with whom_with pt______________  If pt confirmed DC plan does family need to be contacted by CM No if yes who__pt states her spouse is aware____  Discharge Plan: Pt plans to return home at discharge and resume current services with [facility] for SN, PT, OT and speech therapy. Pt states that she is also active with [facility] for home O2 @ 4lpm baseline. Pt denies further needs at discharge. CM called [facility] and spoke with [facility] who confims they are active with pt and will resume care at discharge. All discharge orders/paperwork faxed to [facility] at this time. Date of Last IMM Given: 10/22/22    Reviewed chart. Role of discharge planner explained and patient verbalized understanding. Discharge order is noted. Has Home O2 in place on admit:  Yes  Informed of need to bring portable home O2 tank on day of discharge for nursing to connect prior to leaving:   Yes  Verbalized agreement/Understanding:   Yes  Pt is being d/c'd to home today. Pt's O2 sats are 94% on 3lpm.    Discharge timeout done with [address]. All discharge needs and concerns addressed.

## 2022-10-24 ENCOUNTER — CARE COORDINATION (OUTPATIENT)
Dept: CASE MANAGEMENT | Age: 67
End: 2022-10-24

## 2022-10-24 DIAGNOSIS — I50.32 CHRONIC DIASTOLIC CONGESTIVE HEART FAILURE (HCC): Primary | ICD-10-CM

## 2022-10-24 PROCEDURE — 1111F DSCHRG MED/CURRENT MED MERGE: CPT | Performed by: FAMILY MEDICINE

## 2022-10-24 NOTE — CARE COORDINATION
[facility] Care Transitions Initial Follow Up Call    Call within 2 business days of discharge: Yes    Patient: Michelle Yan Patient : 1955   MRN: [id number]  Reason for Admission: READMISSION for diarrhea likely related to pirfenidone, C Diff ruled out, acute on chronic resp failure with hypoxia, ILD, paroxysmal A Fib, chronic dCHF, DM2, HTN, HLD, hypothyroidism, iron deficiency anemia, depression, anxiety -> home with [facility] (SN PT OT ST), [facility] O2 at baseline 4lpm   Discharge Date: 10/22/22 RARS: Readmission Risk Score: 31.8    Last Discharge  Street       Date Complaint Diagnosis Description Type Department Provider    10/19/22 Diarrhea Acute on chronic respiratory failure with hypoxia ([facility] [address] . .. ED to Hosp-Admission (Discharged) (ADMITTED) [facility] PCU Nolberto Lynn MD; Augie Esquivel . .. Was this an external facility discharge? No Discharge Facility: NA    Challenges to be reviewed by the provider   Additional needs identified to be addressed with provider: Yes  medications-patient calling to schedule hospital follow up appointment and discuss new med metoprolol         Method of communication with provider: chart routing. Care Transition Nurse contacted the patient and family by telephone to perform post hospital discharge assessment. Provided introduction to self, and explanation of the Care Transition Nurse role. Patient went home with daughter Elana Grossman. She has her medications and oxygen at her home. Has high concentrator for O2 and currently at 5lpm because of length of tubing. Medications reviewed. ST to visit tomorrow 10am. Unsure of other disciplines. Started new med but doesn't like how she feels on it - c/o chest discomfort. She is monitoring her VS but no numbers provided to CTN during call. States she plans to discuss with her PCP. Noted no visit scheduled yet. Daughter states they will call to schedule. PCP is in [place], [place].  The drive to PCP is an hour and thirty minutes from patient home and a little longer from daughter's home. Uninterested in provider closer to home. She will call to schedule visit with PCP. Overall reports she is feeling better. Family trying to keep her awake during daytime hours so she will sleep better at night. CTN encouraged mobility hourly - stand and reposition, walk around - to help with strength, endurance and wakefulness. She denies needs/referrals. CTN will contact HC regarding SN PT OT. Letty at [facility] [employer] [facility]. confirms [facility] order on Saturday received. She transferred call to scheduling Riley De La Rosa) confirms SN tomorrow, PT ST Wednesday and OT Thursday. The individuals will be calling to schedule. Care Transition Nurse reviewed discharge instructions, medical action plan, and red flags with family who verbalized understanding. The family was given an opportunity to ask questions and does not have any further questions or concerns at this time. Were discharge instructions available to patient? Yes. Reviewed appropriate site of care based on symptoms and resources available to patient including: PCP  Specialist  Home health  Condition related references. The family agrees to contact the PCP office for questions related to their healthcare. Advance Care Planning:   Does patient have an Advance Directive: reviewed and current and decision maker updated. Medication reconciliation was performed with family, who verbalizes understanding of administration of home medications. Medications reviewed, 1111F entered: yes    Was patient discharged with a pulse oximeter?   Has one and monitors - wears O2 at 5lpm    Non-face-to-face services provided:  Obtained and reviewed discharge summary and/or continuity of care documents  Communication with home health agencies or other community services the patient is currently using-[facility] Intern.  Education of patient/family/caregiver/guardian to support self-management-s/s monitor; when to contact providers  Assessment and support for treatment adherence and medication management-1111F completed    Offered patient enrollment in the Remote Patient Monitoring (RPM) program for in-home monitoring: Patient is not eligible for RPM program.    Care Transitions 24 Hour Call    Do you have all of your prescriptions and are they filled?: Yes  Patient DME: Rita perez  Patient Home Equipment: Nebulizer, Oxygen  Do you have support at home?: Partner/Spouse/SO  Are you an active caregiver in your home?: No  Care Transitions Interventions         Follow Up  Future Appointments   Date Time Provider Calvin Mcmullen   1/5/2023 10:00  [facility] PFT [facility]   1/5/2023 11:15 AM MD Francis Chung [facility]       Care Transition Nurse provided contact information. Plan for follow-up call in 3-5 days based on severity of symptoms and risk factors.   Plan for next call: symptom management-diarrhea, respiratory    Dana De La Vega RN  Care Transition Nurse  [phone] mobile

## 2022-10-25 DIAGNOSIS — F32.0 MILD SINGLE CURRENT EPISODE OF MAJOR DEPRESSIVE DISORDER (HCC): ICD-10-CM

## 2022-10-25 DIAGNOSIS — F41.9 ANXIETY: ICD-10-CM

## 2022-10-25 RX ORDER — PANTOPRAZOLE SODIUM 40 MG/1
TABLET, DELAYED RELEASE ORAL
Qty: 90 TABLET | Refills: 1 | Status: ON HOLD | OUTPATIENT
Start: 2022-10-25

## 2022-10-25 RX ORDER — MIRTAZAPINE 30 MG/1
TABLET, FILM COATED ORAL
Qty: 90 TABLET | Refills: 1 | Status: ON HOLD | OUTPATIENT
Start: 2022-10-25

## 2022-10-25 RX ORDER — SERTRALINE HYDROCHLORIDE 100 MG/1
TABLET, FILM COATED ORAL
Qty: 180 TABLET | Refills: 1 | Status: ON HOLD | OUTPATIENT
Start: 2022-10-25

## 2022-10-26 ENCOUNTER — TELEPHONE (OUTPATIENT)
Dept: FAMILY MEDICINE CLINIC | Age: 67
End: 2022-10-26

## 2022-10-26 NOTE — TELEPHONE ENCOUNTER
Patient's home nurse called stating the patient's beta blocked was changed to metoprolol while in hospital. She stated patient stopped the metoprolol because it made her feel like something was sitting on her chest. She has started taking the nadolol again.

## 2022-10-28 ENCOUNTER — CARE COORDINATION (OUTPATIENT)
Dept: CASE MANAGEMENT | Age: 67
End: 2022-10-28

## 2022-10-28 NOTE — CARE COORDINATION
[facility] Care Transitions Follow Up Call      Patient: Franklyn Bailey  Patient : 1955   MRN: [id number]  Reason for Admission: READMISSION for diarrhea likely related to pirfenidone, C Diff ruled out, acute on chronic resp failure with hypoxia, ILD, paroxysmal A Fib, chronic dCHF, DM2, HTN, HLD, hypothyroidism, iron deficiency anemia, depression, anxiety -> home with [facility] Y[facility] (SN PT OT ST), [facility] O2 at baseline 4lpm   Discharge Date: 10/22/22 RARS: Readmission Risk Score: 31.8    CTN attempted follow-up outreach to patient. Message left including CTN contact information for return call.      Follow Up  Future Appointments   Date Time Provider Calvin Mcmullen   2023 10:00 AM [facility] PULMONARY FUNCTION TESTING [facility] PFT Ondina NOGUERA   2023 11:15 AM MD DURAN Sarmiento RN  Care Transition Nurse  [phone] mobile

## 2022-10-31 ENCOUNTER — CARE COORDINATION (OUTPATIENT)
Dept: CASE MANAGEMENT | Age: 67
End: 2022-10-31

## 2022-11-14 DIAGNOSIS — E11.9 TYPE 2 DIABETES MELLITUS WITHOUT COMPLICATION, WITHOUT LONG-TERM CURRENT USE OF INSULIN (HCC): ICD-10-CM

## 2022-11-14 RX ORDER — SITAGLIPTIN 100 MG/1
TABLET, FILM COATED ORAL
Qty: 90 TABLET | Refills: 1 | OUTPATIENT
Start: 2022-11-14

## 2022-11-14 RX ORDER — METOPROLOL SUCCINATE 25 MG/1
TABLET, EXTENDED RELEASE ORAL
Qty: 30 TABLET | Refills: 3 | OUTPATIENT
Start: 2022-11-14

## 2022-11-26 ENCOUNTER — HOSPITAL ENCOUNTER (INPATIENT)
Age: 67
LOS: 2 days | Discharge: ANOTHER HOSPITAL | DRG: 199 | End: 2022-11-28
Attending: EMERGENCY MEDICINE | Admitting: INTERNAL MEDICINE
Payer: MEDICARE

## 2022-11-26 ENCOUNTER — ANCILLARY PROCEDURE (OUTPATIENT)
Dept: EMERGENCY DEPT | Age: 67
DRG: 199 | End: 2022-11-26
Payer: MEDICARE

## 2022-11-26 ENCOUNTER — APPOINTMENT (OUTPATIENT)
Dept: CT IMAGING | Age: 67
DRG: 199 | End: 2022-11-26
Payer: MEDICARE

## 2022-11-26 ENCOUNTER — APPOINTMENT (OUTPATIENT)
Dept: GENERAL RADIOLOGY | Age: 67
DRG: 199 | End: 2022-11-26
Payer: MEDICARE

## 2022-11-26 DIAGNOSIS — R06.89 DYSPNEA AND RESPIRATORY ABNORMALITIES: ICD-10-CM

## 2022-11-26 DIAGNOSIS — J93.9 PNEUMOTHORAX, UNSPECIFIED TYPE: Primary | ICD-10-CM

## 2022-11-26 DIAGNOSIS — E83.42 HYPOMAGNESEMIA: ICD-10-CM

## 2022-11-26 DIAGNOSIS — R09.02 HYPOXIA: ICD-10-CM

## 2022-11-26 DIAGNOSIS — R06.00 DYSPNEA AND RESPIRATORY ABNORMALITIES: ICD-10-CM

## 2022-11-26 PROBLEM — J93.0 TENSION PNEUMOTHORAX: Status: ACTIVE | Noted: 2022-11-26

## 2022-11-26 LAB
A/G RATIO: 0.8 (ref 1.1–2.2)
ALBUMIN SERPL-MCNC: 3.3 G/DL (ref 3.4–5)
ALP BLD-CCNC: 129 U/L (ref 40–129)
ALT SERPL-CCNC: 13 U/L (ref 10–40)
ANION GAP SERPL CALCULATED.3IONS-SCNC: 11 MMOL/L (ref 3–16)
AST SERPL-CCNC: 50 U/L (ref 15–37)
BASE EXCESS VENOUS: 9.6 MMOL/L (ref -3–3)
BASOPHILS ABSOLUTE: 0 K/UL (ref 0–0.2)
BASOPHILS RELATIVE PERCENT: 0.7 %
BILIRUB SERPL-MCNC: 0.5 MG/DL (ref 0–1)
BUN BLDV-MCNC: 11 MG/DL (ref 7–20)
CALCIUM SERPL-MCNC: 8.9 MG/DL (ref 8.3–10.6)
CARBOXYHEMOGLOBIN: 3.3 % (ref 0–1.5)
CHLORIDE BLD-SCNC: 97 MMOL/L (ref 99–110)
CO2: 33 MMOL/L (ref 21–32)
CREAT SERPL-MCNC: <0.5 MG/DL (ref 0.6–1.2)
EKG ATRIAL RATE: 89 BPM
EKG DIAGNOSIS: NORMAL
EKG P AXIS: 90 DEGREES
EKG P-R INTERVAL: 152 MS
EKG Q-T INTERVAL: 362 MS
EKG QRS DURATION: 74 MS
EKG QTC CALCULATION (BAZETT): 440 MS
EKG R AXIS: 83 DEGREES
EKG T AXIS: 18 DEGREES
EKG VENTRICULAR RATE: 89 BPM
EOSINOPHILS ABSOLUTE: 0.7 K/UL (ref 0–0.6)
EOSINOPHILS RELATIVE PERCENT: 9.6 %
GFR SERPL CREATININE-BSD FRML MDRD: >60 ML/MIN/{1.73_M2}
GLUCOSE BLD-MCNC: 117 MG/DL (ref 70–99)
GLUCOSE BLD-MCNC: 128 MG/DL (ref 70–99)
GLUCOSE BLD-MCNC: 137 MG/DL (ref 70–99)
HCO3 VENOUS: 34.9 MMOL/L (ref 23–29)
HCT VFR BLD CALC: 34.4 % (ref 36–48)
HEMOGLOBIN: 11 G/DL (ref 12–16)
LYMPHOCYTES ABSOLUTE: 1.3 K/UL (ref 1–5.1)
LYMPHOCYTES RELATIVE PERCENT: 18.7 %
MAGNESIUM: 1.6 MG/DL (ref 1.8–2.4)
MCH RBC QN AUTO: 25.2 PG (ref 26–34)
MCHC RBC AUTO-ENTMCNC: 32 G/DL (ref 31–36)
MCV RBC AUTO: 78.7 FL (ref 80–100)
METHEMOGLOBIN VENOUS: 0.3 %
MONOCYTES ABSOLUTE: 0.4 K/UL (ref 0–1.3)
MONOCYTES RELATIVE PERCENT: 5.8 %
NEUTROPHILS ABSOLUTE: 4.6 K/UL (ref 1.7–7.7)
NEUTROPHILS RELATIVE PERCENT: 65.2 %
O2 CONTENT, VEN: 10 VOL %
O2 SAT, VEN: 56 %
O2 THERAPY: ABNORMAL
PCO2, VEN: 50.3 MMHG (ref 40–50)
PDW BLD-RTO: 18.1 % (ref 12.4–15.4)
PERFORMED ON: ABNORMAL
PERFORMED ON: ABNORMAL
PH VENOUS: 7.46 (ref 7.35–7.45)
PLATELET # BLD: 176 K/UL (ref 135–450)
PMV BLD AUTO: 8.5 FL (ref 5–10.5)
PO2, VEN: 28.5 MMHG (ref 25–40)
POTASSIUM REFLEX MAGNESIUM: 3.4 MMOL/L (ref 3.5–5.1)
PRO-BNP: 781 PG/ML (ref 0–124)
PROCALCITONIN: 0.04 NG/ML (ref 0–0.15)
RAPID INFLUENZA  B AGN: NEGATIVE
RAPID INFLUENZA A AGN: NEGATIVE
RBC # BLD: 4.37 M/UL (ref 4–5.2)
SARS-COV-2, NAAT: NOT DETECTED
SODIUM BLD-SCNC: 141 MMOL/L (ref 136–145)
TCO2 CALC VENOUS: 36 MMOL/L
TOTAL PROTEIN: 7.2 G/DL (ref 6.4–8.2)
TROPONIN: <0.01 NG/ML
WBC # BLD: 7.1 K/UL (ref 4–11)

## 2022-11-26 PROCEDURE — 36415 COLL VENOUS BLD VENIPUNCTURE: CPT

## 2022-11-26 PROCEDURE — 6370000000 HC RX 637 (ALT 250 FOR IP)

## 2022-11-26 PROCEDURE — 6370000000 HC RX 637 (ALT 250 FOR IP): Performed by: INTERNAL MEDICINE

## 2022-11-26 PROCEDURE — 99223 1ST HOSP IP/OBS HIGH 75: CPT | Performed by: INTERNAL MEDICINE

## 2022-11-26 PROCEDURE — 84484 ASSAY OF TROPONIN QUANT: CPT

## 2022-11-26 PROCEDURE — 32551 INSERTION OF CHEST TUBE: CPT

## 2022-11-26 PROCEDURE — 71260 CT THORAX DX C+: CPT | Performed by: EMERGENCY MEDICINE

## 2022-11-26 PROCEDURE — 85025 COMPLETE CBC W/AUTO DIFF WBC: CPT

## 2022-11-26 PROCEDURE — 96375 TX/PRO/DX INJ NEW DRUG ADDON: CPT

## 2022-11-26 PROCEDURE — 93010 ELECTROCARDIOGRAM REPORT: CPT | Performed by: INTERNAL MEDICINE

## 2022-11-26 PROCEDURE — 71045 X-RAY EXAM CHEST 1 VIEW: CPT

## 2022-11-26 PROCEDURE — 96365 THER/PROPH/DIAG IV INF INIT: CPT

## 2022-11-26 PROCEDURE — 2580000003 HC RX 258

## 2022-11-26 PROCEDURE — 2000000000 HC ICU R&B

## 2022-11-26 PROCEDURE — 83880 ASSAY OF NATRIURETIC PEPTIDE: CPT

## 2022-11-26 PROCEDURE — 2580000003 HC RX 258: Performed by: EMERGENCY MEDICINE

## 2022-11-26 PROCEDURE — 83036 HEMOGLOBIN GLYCOSYLATED A1C: CPT

## 2022-11-26 PROCEDURE — 94761 N-INVAS EAR/PLS OXIMETRY MLT: CPT

## 2022-11-26 PROCEDURE — 6360000002 HC RX W HCPCS

## 2022-11-26 PROCEDURE — 6360000004 HC RX CONTRAST MEDICATION: Performed by: EMERGENCY MEDICINE

## 2022-11-26 PROCEDURE — 0W9930Z DRAINAGE OF RIGHT PLEURAL CAVITY WITH DRAINAGE DEVICE, PERCUTANEOUS APPROACH: ICD-10-PCS | Performed by: EMERGENCY MEDICINE

## 2022-11-26 PROCEDURE — 87804 INFLUENZA ASSAY W/OPTIC: CPT

## 2022-11-26 PROCEDURE — 96366 THER/PROPH/DIAG IV INF ADDON: CPT

## 2022-11-26 PROCEDURE — 84145 PROCALCITONIN (PCT): CPT

## 2022-11-26 PROCEDURE — 82803 BLOOD GASES ANY COMBINATION: CPT

## 2022-11-26 PROCEDURE — 83735 ASSAY OF MAGNESIUM: CPT

## 2022-11-26 PROCEDURE — 6370000000 HC RX 637 (ALT 250 FOR IP): Performed by: EMERGENCY MEDICINE

## 2022-11-26 PROCEDURE — 99285 EMERGENCY DEPT VISIT HI MDM: CPT

## 2022-11-26 PROCEDURE — 6360000002 HC RX W HCPCS: Performed by: EMERGENCY MEDICINE

## 2022-11-26 PROCEDURE — 93308 TTE F-UP OR LMTD: CPT

## 2022-11-26 PROCEDURE — 80053 COMPREHEN METABOLIC PANEL: CPT

## 2022-11-26 PROCEDURE — 93005 ELECTROCARDIOGRAM TRACING: CPT | Performed by: EMERGENCY MEDICINE

## 2022-11-26 PROCEDURE — 2700000000 HC OXYGEN THERAPY PER DAY

## 2022-11-26 PROCEDURE — 87635 SARS-COV-2 COVID-19 AMP PRB: CPT

## 2022-11-26 PROCEDURE — 2500000003 HC RX 250 WO HCPCS: Performed by: EMERGENCY MEDICINE

## 2022-11-26 RX ORDER — LORAZEPAM 0.5 MG/1
0.5 TABLET ORAL EVERY 6 HOURS PRN
Status: DISCONTINUED | OUTPATIENT
Start: 2022-11-26 | End: 2022-11-28 | Stop reason: HOSPADM

## 2022-11-26 RX ORDER — ONDANSETRON 2 MG/ML
4 INJECTION INTRAMUSCULAR; INTRAVENOUS EVERY 6 HOURS PRN
Status: DISCONTINUED | OUTPATIENT
Start: 2022-11-26 | End: 2022-11-28 | Stop reason: HOSPADM

## 2022-11-26 RX ORDER — KETAMINE HYDROCHLORIDE 50 MG/ML
1 INJECTION, SOLUTION, CONCENTRATE INTRAMUSCULAR; INTRAVENOUS ONCE
Status: COMPLETED | OUTPATIENT
Start: 2022-11-26 | End: 2022-11-26

## 2022-11-26 RX ORDER — MIDAZOLAM HYDROCHLORIDE 1 MG/ML
1 INJECTION INTRAMUSCULAR; INTRAVENOUS ONCE
Status: COMPLETED | OUTPATIENT
Start: 2022-11-26 | End: 2022-11-26

## 2022-11-26 RX ORDER — POTASSIUM CHLORIDE 7.45 MG/ML
10 INJECTION INTRAVENOUS PRN
Status: DISCONTINUED | OUTPATIENT
Start: 2022-11-26 | End: 2022-11-28 | Stop reason: HOSPADM

## 2022-11-26 RX ORDER — POTASSIUM CHLORIDE 750 MG/1
40 TABLET, EXTENDED RELEASE ORAL ONCE
Status: COMPLETED | OUTPATIENT
Start: 2022-11-26 | End: 2022-11-26

## 2022-11-26 RX ORDER — SODIUM CHLORIDE 0.9 % (FLUSH) 0.9 %
10 SYRINGE (ML) INJECTION PRN
Status: DISCONTINUED | OUTPATIENT
Start: 2022-11-26 | End: 2022-11-28 | Stop reason: HOSPADM

## 2022-11-26 RX ORDER — PANTOPRAZOLE SODIUM 40 MG/1
40 TABLET, DELAYED RELEASE ORAL
Status: DISCONTINUED | OUTPATIENT
Start: 2022-11-27 | End: 2022-11-28 | Stop reason: HOSPADM

## 2022-11-26 RX ORDER — INSULIN LISPRO 100 [IU]/ML
0-4 INJECTION, SOLUTION INTRAVENOUS; SUBCUTANEOUS NIGHTLY
Status: DISCONTINUED | OUTPATIENT
Start: 2022-11-26 | End: 2022-11-28 | Stop reason: HOSPADM

## 2022-11-26 RX ORDER — SODIUM CHLORIDE 0.9 % (FLUSH) 0.9 %
5-40 SYRINGE (ML) INJECTION EVERY 12 HOURS SCHEDULED
Status: DISCONTINUED | OUTPATIENT
Start: 2022-11-26 | End: 2022-11-28 | Stop reason: HOSPADM

## 2022-11-26 RX ORDER — MIRTAZAPINE 15 MG/1
30 TABLET, FILM COATED ORAL NIGHTLY
Status: DISCONTINUED | OUTPATIENT
Start: 2022-11-26 | End: 2022-11-28 | Stop reason: HOSPADM

## 2022-11-26 RX ORDER — ACETAMINOPHEN 650 MG/1
650 SUPPOSITORY RECTAL EVERY 6 HOURS PRN
Status: DISCONTINUED | OUTPATIENT
Start: 2022-11-26 | End: 2022-11-28 | Stop reason: HOSPADM

## 2022-11-26 RX ORDER — LEVOTHYROXINE SODIUM 0.12 MG/1
125 TABLET ORAL DAILY
Status: DISCONTINUED | OUTPATIENT
Start: 2022-11-26 | End: 2022-11-28 | Stop reason: HOSPADM

## 2022-11-26 RX ORDER — POLYETHYLENE GLYCOL 3350 17 G/17G
17 POWDER, FOR SOLUTION ORAL DAILY PRN
Status: DISCONTINUED | OUTPATIENT
Start: 2022-11-26 | End: 2022-11-28 | Stop reason: HOSPADM

## 2022-11-26 RX ORDER — METOPROLOL SUCCINATE 25 MG/1
25 TABLET, EXTENDED RELEASE ORAL DAILY
Status: DISCONTINUED | OUTPATIENT
Start: 2022-11-26 | End: 2022-11-28 | Stop reason: HOSPADM

## 2022-11-26 RX ORDER — INSULIN LISPRO 100 [IU]/ML
0-4 INJECTION, SOLUTION INTRAVENOUS; SUBCUTANEOUS
Status: DISCONTINUED | OUTPATIENT
Start: 2022-11-26 | End: 2022-11-28 | Stop reason: HOSPADM

## 2022-11-26 RX ORDER — ONDANSETRON 4 MG/1
4 TABLET, ORALLY DISINTEGRATING ORAL EVERY 8 HOURS PRN
Status: DISCONTINUED | OUTPATIENT
Start: 2022-11-26 | End: 2022-11-28 | Stop reason: HOSPADM

## 2022-11-26 RX ORDER — ENOXAPARIN SODIUM 100 MG/ML
30 INJECTION SUBCUTANEOUS DAILY
Status: DISCONTINUED | OUTPATIENT
Start: 2022-11-26 | End: 2022-11-28 | Stop reason: HOSPADM

## 2022-11-26 RX ORDER — DEXTROSE MONOHYDRATE 100 MG/ML
INJECTION, SOLUTION INTRAVENOUS CONTINUOUS PRN
Status: DISCONTINUED | OUTPATIENT
Start: 2022-11-26 | End: 2022-11-28 | Stop reason: HOSPADM

## 2022-11-26 RX ORDER — POTASSIUM CHLORIDE 750 MG/1
40 TABLET, EXTENDED RELEASE ORAL PRN
Status: DISCONTINUED | OUTPATIENT
Start: 2022-11-26 | End: 2022-11-28 | Stop reason: HOSPADM

## 2022-11-26 RX ORDER — MAGNESIUM SULFATE 1 G/100ML
1000 INJECTION INTRAVENOUS PRN
Status: DISCONTINUED | OUTPATIENT
Start: 2022-11-26 | End: 2022-11-28 | Stop reason: HOSPADM

## 2022-11-26 RX ORDER — METHYLPREDNISOLONE SODIUM SUCCINATE 125 MG/2ML
125 INJECTION, POWDER, LYOPHILIZED, FOR SOLUTION INTRAMUSCULAR; INTRAVENOUS ONCE
Status: COMPLETED | OUTPATIENT
Start: 2022-11-26 | End: 2022-11-26

## 2022-11-26 RX ORDER — IPRATROPIUM BROMIDE AND ALBUTEROL SULFATE 2.5; .5 MG/3ML; MG/3ML
1 SOLUTION RESPIRATORY (INHALATION) ONCE
Status: COMPLETED | OUTPATIENT
Start: 2022-11-26 | End: 2022-11-26

## 2022-11-26 RX ORDER — SODIUM CHLORIDE 9 MG/ML
INJECTION, SOLUTION INTRAVENOUS PRN
Status: DISCONTINUED | OUTPATIENT
Start: 2022-11-26 | End: 2022-11-28 | Stop reason: HOSPADM

## 2022-11-26 RX ORDER — ATORVASTATIN CALCIUM 40 MG/1
40 TABLET, FILM COATED ORAL DAILY
Status: DISCONTINUED | OUTPATIENT
Start: 2022-11-26 | End: 2022-11-28 | Stop reason: HOSPADM

## 2022-11-26 RX ORDER — EMPAGLIFLOZIN 25 MG/1
TABLET, FILM COATED ORAL
Status: ON HOLD | COMMUNITY
Start: 2022-11-16 | End: 2022-11-30

## 2022-11-26 RX ORDER — HYDROXYZINE HYDROCHLORIDE 10 MG/1
10 TABLET, FILM COATED ORAL EVERY 8 HOURS PRN
Status: DISCONTINUED | OUTPATIENT
Start: 2022-11-26 | End: 2022-11-28 | Stop reason: HOSPADM

## 2022-11-26 RX ORDER — SERTRALINE HYDROCHLORIDE 100 MG/1
200 TABLET, FILM COATED ORAL DAILY
Status: DISCONTINUED | OUTPATIENT
Start: 2022-11-26 | End: 2022-11-28 | Stop reason: HOSPADM

## 2022-11-26 RX ORDER — ALBUTEROL SULFATE 90 UG/1
2 AEROSOL, METERED RESPIRATORY (INHALATION) EVERY 6 HOURS PRN
Status: DISCONTINUED | OUTPATIENT
Start: 2022-11-26 | End: 2022-11-28 | Stop reason: HOSPADM

## 2022-11-26 RX ORDER — ACETAMINOPHEN 325 MG/1
650 TABLET ORAL EVERY 6 HOURS PRN
Status: DISCONTINUED | OUTPATIENT
Start: 2022-11-26 | End: 2022-11-28 | Stop reason: HOSPADM

## 2022-11-26 RX ORDER — ALBUTEROL SULFATE 2.5 MG/3ML
2.5 SOLUTION RESPIRATORY (INHALATION) EVERY 6 HOURS PRN
Status: DISCONTINUED | OUTPATIENT
Start: 2022-11-26 | End: 2022-11-28 | Stop reason: HOSPADM

## 2022-11-26 RX ADMIN — ENOXAPARIN SODIUM 30 MG: 100 INJECTION SUBCUTANEOUS at 16:07

## 2022-11-26 RX ADMIN — SERTRALINE 200 MG: 100 TABLET, FILM COATED ORAL at 16:02

## 2022-11-26 RX ADMIN — LEVOTHYROXINE SODIUM 125 MCG: 125 TABLET ORAL at 16:02

## 2022-11-26 RX ADMIN — IPRATROPIUM BROMIDE AND ALBUTEROL SULFATE 1 AMPULE: 2.5; .5 SOLUTION RESPIRATORY (INHALATION) at 07:26

## 2022-11-26 RX ADMIN — ACETAMINOPHEN 650 MG: 325 TABLET ORAL at 20:12

## 2022-11-26 RX ADMIN — IOPAMIDOL 75 ML: 755 INJECTION, SOLUTION INTRAVENOUS at 08:55

## 2022-11-26 RX ADMIN — MAGNESIUM SULFATE HEPTAHYDRATE 4000 MG: 500 INJECTION, SOLUTION INTRAMUSCULAR; INTRAVENOUS at 09:11

## 2022-11-26 RX ADMIN — Medication 10 ML: at 20:12

## 2022-11-26 RX ADMIN — KETAMINE HYDROCHLORIDE 50 MG: 50 INJECTION, SOLUTION INTRAMUSCULAR; INTRAVENOUS at 10:10

## 2022-11-26 RX ADMIN — METOPROLOL SUCCINATE 25 MG: 25 TABLET, EXTENDED RELEASE ORAL at 16:02

## 2022-11-26 RX ADMIN — POTASSIUM CHLORIDE 40 MEQ: 750 TABLET, EXTENDED RELEASE ORAL at 21:08

## 2022-11-26 RX ADMIN — MIRTAZAPINE 30 MG: 15 TABLET, FILM COATED ORAL at 20:11

## 2022-11-26 RX ADMIN — MIDAZOLAM HYDROCHLORIDE 1 MG: 1 INJECTION, SOLUTION INTRAMUSCULAR; INTRAVENOUS at 10:09

## 2022-11-26 RX ADMIN — METHYLPREDNISOLONE SODIUM SUCCINATE 125 MG: 125 INJECTION, POWDER, FOR SOLUTION INTRAMUSCULAR; INTRAVENOUS at 08:30

## 2022-11-26 RX ADMIN — ATORVASTATIN CALCIUM 40 MG: 40 TABLET, FILM COATED ORAL at 16:02

## 2022-11-26 ASSESSMENT — PAIN DESCRIPTION - ORIENTATION: ORIENTATION: RIGHT

## 2022-11-26 ASSESSMENT — PAIN DESCRIPTION - LOCATION: LOCATION: CHEST

## 2022-11-26 ASSESSMENT — PAIN SCALES - GENERAL
PAINLEVEL_OUTOF10: 0
PAINLEVEL_OUTOF10: 0
PAINLEVEL_OUTOF10: 2

## 2022-11-26 ASSESSMENT — PAIN DESCRIPTION - DESCRIPTORS: DESCRIPTORS: ACHING

## 2022-11-26 NOTE — ED NOTES
Chest tube inserted by Dr Mechelle Ricketts and connected to water seal with cont suction. Patient tolerated well. VSS.      Darío Ruiz RN  11/26/22 2576

## 2022-11-26 NOTE — ED NOTES
Report called to MICK Munoz at [facility] ICU. Bedside report given to [employer]. VSS. Chest tube to water seal suction in place. 10L non-rebreather at 100%. SL to right forearm. No s/s of distress/discomfort noted. Patient being transferred to [facility] ICU at this time.       Filomena Allison RN  11/26/22 1318

## 2022-11-26 NOTE — CONSULTS
Reason for referral and CC: Tension pneumothorax    HISTORY OF PRESENT ILLNESS: 68 yo female with a h/o ILD and chronic hypoxia presented to [facility] ED with SOB progressive over several days. Found to have a tension pneumothorax and a chest tube was placed. She feels less SOB now. She also had a right pneumothorax 9/2022.    100% on 8 lpm O2  Past Medical History:   Diagnosis Date    A-fib ([address]     Anxiety     Cryptogenic organizing pneumonia ([address]     Depression     GERD (gastroesophageal reflux disease)     Hyperlipidemia     On home oxygen therapy     uses O2 3L prn    Rash     Thyroid disease     hypothyroidism     Past Surgical History:   Procedure Laterality Date    ARM SURGERY Left 3/2/2020    LEFT ULNAR NERVE DECOMPRESSION AT THE ELBOW performed by Beto Child MD at [facility] N/A 6/27/2019    EBUS WF W/ANES.  performed by Christian Moseley MD at [facility]  6/27/2019    BRONCHOSCOPY/TRANSBRONCHIAL NEEDLE BIOPSY performed by Christian Moseley MD at [facility]  6/27/2019    BRONCHOSCOPY/TRANSBRONCHIAL LUNG BIOPSY performed by Christian Moseley MD at [facility]  6/27/2019    BRONCHOSCOPY ALVEOLAR LAVAGE performed by Christian Moseley MD at [facility]  07/10/2019    BRONCHOSCOPY N/A 7/10/2019    BRONCHOSCOPY ALVEOLAR LAVAGE performed by Anna Marie Valadez MD at [facility] Bilateral 08/06/2019    BRONCHOSCOPY N/A 8/6/2019    BRONCHOSCOPY ALVEOLAR LAVAGE performed by Riaz Kwong MD at [facility] N/A 12/24/2019    BRONCHOSCOPY ALVEOLAR LAVAGE performed by Parul Santana MD at [facility] 8/25/2022    COLONOSCOPY POLYPECTOMY SNARE/COLD BIOPSY performed by Warner Boswell MD at [address]  9/15/2022    CT GUIDED CHEST TUBE 9/15/2022 [facility] CT SCAN    HYSTERECTOMY, TOTAL ABDOMINAL (CERVIX REMOVED)      partial     Family History  family history includes Asthma in her sister; Diabetes in her mother; High Blood Pressure in her mother; Other in her father. Social History:  reports that she has never smoked. She has never used smokeless tobacco.   reports no history of alcohol use. ALLERGIES:  Patient is allergic to penicillins, cefuroxime, doxycycline, imitrex [sumatriptan], meperidine, other, sulfa antibiotics, keflex [cephalexin], and tape [adhesive tape].   Continuous Infusions:   sodium chloride      dextrose       Scheduled Meds:   atorvastatin  40 mg Oral Daily    levothyroxine  125 mcg Oral Daily    metoprolol succinate  25 mg Oral Daily    mirtazapine  30 mg Oral Nightly    [START ON 11/27/2022] pantoprazole  40 mg Oral QAM AC    sertraline  200 mg Oral Daily    sodium chloride flush  5-40 mL IntraVENous 2 times per day    enoxaparin  30 mg SubCUTAneous Daily    insulin lispro  0-4 Units SubCUTAneous TID WC    insulin lispro  0-4 Units SubCUTAneous Nightly     PRN Meds:  albuterol, albuterol sulfate HFA, hydrOXYzine HCl, LORazepam, sodium chloride flush, sodium chloride, potassium chloride **OR** potassium alternative oral replacement **OR** potassium chloride, magnesium sulfate, ondansetron **OR** ondansetron, polyethylene glycol, acetaminophen **OR** acetaminophen, glucose, dextrose bolus **OR** dextrose bolus, glucagon (rDNA), dextrose    REVIEW OF SYSTEMS:  Constitutional: Negative for fever  HENT: Negative for sore throat  Eyes: Negative for redness   Respiratory: Negative for dyspnea, cough  Cardiovascular: Negative for chest pain  Gastrointestinal: Negative for vomiting, diarrhea   Genitourinary: Negative for hematuria   Musculoskeletal: Negative for arthralgias   Skin: Negative for rash  Neurological: Negative for syncope  Hematological: Negative for adenopathy  Psychiatric/Behavorial: Negative for anxiety    PHYSICAL EXAM: /62   Pulse 78   Temp 97.8 °F (36.6 °C) (Oral) Resp 27   Ht 5' 3\" (1.6 m)   Wt 108 lb (49 kg)   SpO2 100%   BMI 19.13 kg/m²  on 100% on 8lpm   Constitutional:  No acute distress. Eyes: PERRL. Conjunctivae anicteric. ENT: Normal nose. Normal tongue. Neck:  Trachea is midline. No thyroid tenderness. Respiratory: No accessory muscle usage. no decreased breath sounds. No wheezes. + rales. No Rhonchi. Cardiovascular: Normal S1S2. No digit clubbing. No digit cyanosis. No LE edema. Capillary refill is normal.   Gastrointestinal: No mass palpated. No tenderness palpated. No umbilical hernia. Lymphatic: No cervical or supraclavicular adenopathy. Skin: No rash on the exposed extremities. No Nodules or induration on exposed extremities. Psychiatric: No anxiety or Agitation. Alert and Oriented to person, place and time. CBC:   Recent Labs     11/26/22  0720   WBC 7.1   HGB 11.0*   HCT 34.4*   MCV 78.7*        BMP:   Recent Labs     11/26/22  0720      K 3.4*   CL 97*   CO2 33*   BUN 11   CREATININE <0.5*        Recent Labs     11/26/22  0720   AST 50*   ALT 13   BILITOT 0.5   ALKPHOS 129     No results for input(s): PROTIME, INR in the last 72 hours. No results for input(s): NITRITE, COLORU, PHUR, LABCAST, WBCUA, RBCUA, MUCUS, TRICHOMONAS, YEAST, BACTERIA, CLARITYU, SPECGRAV, LEUKOCYTESUR, UROBILINOGEN, BILIRUBINUR, BLOODU, GLUCOSEU, AMORPHOUS in the last 72 hours. Invalid input(s): KETONESU  No results for input(s): PHART, YCI6SLU, PO2ART in the last 72 hours. PCT 0.04  Rapid flu/covid negative    Chest imaging was reviewed by me and showed 11/26/22 CTPA:   1. Large right tension pneumothorax. 2. Unchanged end-stage chronic interstitial lung disease. 3. Unchanged mediastinal adenopathy, likely reactive.      ASSESSMENT:  Right tension pneumothorax - recurrent  H/o right pneumothorax 9./2022  ILD: h/o chronic HP vs NSIP  Chronic hypoxic respiratory failure     PLAN:  Supplemental oxygen to maintain SaO2 >92%; wean as tolerated Daily CXR  Replace K and Mg  CT to -20cm H20 suction  Consider a CTS consult tomorrow - may need pleurodesis for recurrent right pneumothorax  Ok for 2 west or u    Thank you Aranza Rodriguez, * for this consult

## 2022-11-26 NOTE — PLAN OF CARE
Hx of Chronic ILD vs cryptogenic organizing pna   Tension pneumothorax     CTPA neg for acute pathology     ICU     FRANCISCA Renteria  11/26/22  11:04 AM

## 2022-11-26 NOTE — ED PROVIDER NOTES
CHIEF COMPLAINT  Shortness of Breath      HISTORY OF PRESENT ILLNESS  Sayra Rice is a 69 y.o. female with a history of cryptogenic organizing pneumonia, COPD, GERD, on 3 to 4 L O2, presenting for evaluation of shortness of breath. She has had shortness of breath over the past several days. She has had a productive cough. No fevers. No known sick contacts. No new leg swelling. No chest pain. She was wearing oxygen including a nonrebreather when EMS arrived. She was reportedly hypoxic into the 80s. She had recent hospitalization at [facility] for anemia, shortness of breath. No other complaints, modifying factors or associated symptoms. I have reviewed the following from the nursing documentation. Past Medical History:   Diagnosis Date    A-fib [address]     Anxiety     Cryptogenic organizing pneumonia ([facility]     Depression     GERD (gastroesophageal reflux disease)     Hyperlipidemia     On home oxygen therapy     uses O2 3L prn    Rash     Thyroid disease     hypothyroidism     Past Surgical History:   Procedure Laterality Date    ARM SURGERY Left 3/2/2020    LEFT ULNAR NERVE DECOMPRESSION AT THE ELBOW performed by Negin Higgins MD at [facility] N/A 6/27/2019    EBUS WF W/ANES.  performed by Jessica Roberson MD at [address] [place] [address]  6/27/2019    BRONCHOSCOPY/TRANSBRONCHIAL NEEDLE BIOPSY performed by Jessica Roberson MD at [address] [place] [address]  6/27/2019    BRONCHOSCOPY/TRANSBRONCHIAL LUNG BIOPSY performed by Jessica Roberson MD at [address] [place] [address]  6/27/2019    BRONCHOSCOPY ALVEOLAR LAVAGE performed by Jessica Roberson MD at [address] [place] [address]  07/10/2019    BRONCHOSCOPY N/A 7/10/2019    BRONCHOSCOPY ALVEOLAR LAVAGE performed by Jesus Murray MD at [address] [place] [address] Bilateral 08/06/2019    BRONCHOSCOPY N/A 8/6/2019    BRONCHOSCOPY ALVEOLAR LAVAGE performed by Greer Deras MD at [address] [place] [address] ENDOSCOPY    BRONCHOSCOPY N/A 12/24/2019    BRONCHOSCOPY ALVEOLAR LAVAGE performed by Chaka Gold MD at [facility]      COLONOSCOPY N/A 8/25/2022    COLONOSCOPY POLYPECTOMY SNARE/COLD BIOPSY performed by Heather Musa MD at [facility]  9/15/2022    CT GUIDED CHEST TUBE 9/15/2022 [facility] CT SCAN    HYSTERECTOMY, TOTAL ABDOMINAL (CERVIX REMOVED)      partial     Family History   Problem Relation Age of Onset    Diabetes Mother     High Blood Pressure Mother     Asthma Sister     Other Father      Social History     Socioeconomic History    Marital status:      Spouse name: Not on file    Number of children: 7    Years of education: Not on file    Highest education level: Not on file   Occupational History    Not on file   Tobacco Use    Smoking status: Never    Smokeless tobacco: Never   Vaping Use    Vaping Use: Never used   Substance and Sexual Activity    Alcohol use: No    Drug use: No    Sexual activity: Not Currently   Other Topics Concern    Not on file   Social History Narrative    Not on file     Social Determinants of Health     Financial Resource Strain: Not on file   Food Insecurity: Not on file   Transportation Needs: No Transportation Needs    Lack of Transportation (Medical): No    Lack of Transportation (Non-Medical): No   Physical Activity: Not on file   Stress: Not on file   Social Connections: Not on file   Intimate Partner Violence: Not on file   Housing Stability: Not on file     No current facility-administered medications for this encounter. Allergies   Allergen Reactions    Penicillins Anaphylaxis     Tolerates Meropenem. Cefuroxime      Tolerates Meropenem.     Doxycycline Hives    Imitrex [Sumatriptan] Other (See Comments)     Feels like pins and needles    Meperidine     Other Other (See Comments)     Blood pressure drops, light headed    Sulfa Antibiotics Hives    Keflex [Cephalexin] Itching and Rash     Tolerates Meropenem. Tape Cynthia Perrin Tape] Rash       REVIEW OF SYSTEMS  Positive and pertinent negatives as per HPI. All other systems were reviewed and are negative. PHYSICAL EXAM  /62   Pulse 78   Temp 97.8 °F (36.6 °C) (Oral)   Resp 27   Ht 5' 3\" (1.6 m)   Wt 108 lb (49 kg)   SpO2 100%   BMI 19.13 kg/m²   GENERAL APPEARANCE: Awake and alert. Appears chronically ill  HEAD: Normocephalic. Atraumatic. ENT: Mucous membranes are moist.   HEART: RRR. No harsh murmurs. Intact radial pulses 2+ bilaterally. LUNGS: Respirations unlabored without accessory muscle use. On nonrebreather, diminished breath sounds bilaterally  ABDOMEN: Soft. Non-distended. Non-tender. No guarding or rebound. EXTREMITIES: No peripheral edema. No acute deformities. SKIN: Warm and dry. No acute rashes. NEUROLOGICAL: Alert and oriented X 3. No focal deficit    LABS  I have reviewed all labs for this visit.    Results for orders placed or performed during the hospital encounter of 11/26/22   COVID-19, Rapid    Specimen: Nasopharyngeal Swab   Result Value Ref Range    SARS-CoV-2, NAAT Not Detected Not Detected   Rapid influenza A/B antigens    Specimen: Nasopharyngeal   Result Value Ref Range    Rapid Influenza A Ag Negative Negative    Rapid Influenza B Ag Negative Negative   CBC with Auto Differential   Result Value Ref Range    WBC 7.1 4.0 - 11.0 K/uL    RBC 4.37 4.00 - 5.20 M/uL    Hemoglobin 11.0 (L) 12.0 - 16.0 g/dL    Hematocrit 34.4 (L) 36.0 - 48.0 %    MCV 78.7 (L) 80.0 - 100.0 fL    MCH 25.2 (L) 26.0 - 34.0 pg    MCHC 32.0 31.0 - 36.0 g/dL    RDW 18.1 (H) 12.4 - 15.4 %    Platelets 023 218 - 563 K/uL    MPV 8.5 5.0 - 10.5 fL    Neutrophils % 65.2 %    Lymphocytes % 18.7 %    Monocytes % 5.8 %    Eosinophils % 9.6 %    Basophils % 0.7 %    Neutrophils Absolute 4.6 1.7 - 7.7 K/uL    Lymphocytes Absolute 1.3 1.0 - 5.1 K/uL    Monocytes Absolute 0.4 0.0 - 1.3 K/uL    Eosinophils Absolute 0.7 (H) 0.0 - 0.6 K/uL    Basophils Absolute 0.0 0.0 - 0.2 K/uL   CMP w/ Reflex to MG   Result Value Ref Range    Sodium 141 136 - 145 mmol/L    Potassium reflex Magnesium 3.4 (L) 3.5 - 5.1 mmol/L    Chloride 97 (L) 99 - 110 mmol/L    CO2 33 (H) 21 - 32 mmol/L    Anion Gap 11 3 - 16    Glucose 128 (H) 70 - 99 mg/dL    BUN 11 7 - 20 mg/dL    Creatinine <0.5 (L) 0.6 - 1.2 mg/dL    Est, Glom Filt Rate >60 >60    Calcium 8.9 8.3 - 10.6 mg/dL    Total Protein 7.2 6.4 - 8.2 g/dL    Albumin 3.3 (L) 3.4 - 5.0 g/dL    Albumin/Globulin Ratio 0.8 (L) 1.1 - 2.2    Total Bilirubin 0.5 0.0 - 1.0 mg/dL    Alkaline Phosphatase 129 40 - 129 U/L    ALT 13 10 - 40 U/L    AST 50 (H) 15 - 37 U/L   Magnesium   Result Value Ref Range    Magnesium 1.60 (L) 1.80 - 2.40 mg/dL   Troponin   Result Value Ref Range    Troponin <0.01 <0.01 ng/mL   Brain Natriuretic Peptide   Result Value Ref Range    Pro- (H) 0 - 124 pg/mL   Blood gas, venous   Result Value Ref Range    pH, Japser 7.459 (H) 7.350 - 7.450    pCO2, Jasper 50.3 (H) 40.0 - 50.0 mmHg    pO2, Jasper 28.5 25.0 - 40.0 mmHg    HCO3, Venous 34.9 (H) 23.0 - 29.0 mmol/L    Base Excess, Jasper 9.6 (H) -3.0 - 3.0 mmol/L    O2 Sat, Jasper 56 Not Established %    Carboxyhemoglobin 3.3 (H) 0.0 - 1.5 %    MetHgb, Jasper 0.3 <1.5 %    TC02 (Calc), Jasper 36 Not Established mmol/L    O2 Content, Jasper 10 Not Established VOL %    O2 Therapy Unknown    EKG 12 Lead   Result Value Ref Range    Ventricular Rate 89 BPM    Atrial Rate 89 BPM    P-R Interval 152 ms    QRS Duration 74 ms    Q-T Interval 362 ms    QTc Calculation (Bazett) 440 ms    P Axis 90 degrees    R Axis 83 degrees    T Axis 18 degrees    Diagnosis       Sinus rhythm with occasional Premature ventricular complexesNonspecific T wave abnormalityAbnormal ECGWhen compared with ECG of 21-OCT-2022 12:47,Premature ventricular complexes are now PresentPremature atrial complexes are no longer PresentBorderline criteria for Inferior infarct are no longer PresentNon-specific change in ST segment in Inferior leads         EKG  The Ekg interpreted by myself in the emergency department in the absence of a cardiologist.  normal sinus rhythm with a rate of 89  Axis is   Normal  QTc is  within an acceptable range  Intervals and Durations are unremarkable. No specific ST-T wave changes appreciated. T wave inversion V2, V3, nonspecific ST changes 2 3 aVF, V4 through V6  No evidence of acute ischemia. No significant change from prior EKG dated 10/19/2022      RADIOLOGY  X-RAYS:  I have reviewed radiologic plain film image(s). ALL OTHER NON-PLAIN FILM IMAGES SUCH AS CT, ULTRASOUND AND MRI HAVE BEEN READ BY THE RADIOLOGIST. XR CHEST PORTABLE   Final Result   Small bore right pleural drainage catheter with tip in the hilar/suprahilar   region. Persistent large right pneumothorax similar to chest CT of   11/26/2022. Underlying findings of advanced chronic lung disease and/or   pneumonitis/atypical pneumonia throughout expanded portions of both lung   fields. Comment: Findings discussed with ordering provider Dr. Delvis Beyer at 1100 hours   on 11/26/2022         CT CHEST PULMONARY EMBOLISM W CONTRAST   Final Result   1. Large right tension pneumothorax. 2. Unchanged end-stage chronic interstitial lung disease. 3. Unchanged mediastinal adenopathy, likely reactive. Findings were discussed with German Dugan at 9:15 am on 11/26/2022. POC US [facility]   Final Result      XR CHEST PORTABLE    (Results Pending)          [facility]    Result Date: 11/26/2022  POCUS_Cardiac:     Exam Information:          Exam type:  Clinically indicated     Indication(s) for Exam:          The exam was performed with the following indications[de-identified]  Dyspnea, Concern for effusion     Views Obtained & Images Saved for These Views:           The pericardial sac, myocardium, 4 chambers, and IVC were identified using the following views[de-identified]  Subxiphoid, Parasternal long-axis, Parasternal short-axis     Findings:          Pericardial Effusion:  Small pericardial effusion         Cardiac Activity:  Cardiac activity normal         LV function:  Indeterminate         IVC collapsibility:  Indeterminate     Interpretation:          Pericardial Effusion  Electronically signed by Kala Javier on Saturday, November 26, 2022 at 8:08 AM : Attending:       Critical Care: Total critical care time is 37 minutes, which excludes separately billable procedures and updating family. Time spent is specifically for management of the presenting complaint and symptoms initially, direct bedside care, reevaluation, review of records, and consultation. There was a high probability of clinically significant life-threatening deterioration in the patient's condition, which required my urgent intervention. PROCEDURE:  CHEST TUBE PLACEMENT  INDICATION: Pneumothorax  The benefits, risks, and alternatives of procedural sedation were discussed with the patient or their surrogate. The patient and her family member provided consent. Patient was placed on 2L oxygen via NC. The patient was placed in supine position with the right arm raised above head. The 3rd/4th intercostal space was palpated and the site was prepped and draped in standard sterile fashion. Site was chosen 2. 5 mL of 1% lidocaine with epi then injected for local anesthesia. A Tennyson pneumothorax cath was used for chest tube placement. Condensation noted with respiration. Tube tied down using 2-0 silk sutured. The chest tube was then hooked up to suction unit and bubbling noted. Gauze and Tegaderm used as dressing. Patient tolerated procedure well without immediate complications. Post procedure x-ray done and interpreted by myself as improved aeration of right lung.            During the patient's ED course, the patient was given:  Medications   ipratropium-albuterol (DUONEB) nebulizer solution 1 ampule (1 ampule Inhalation Given 11/26/22 0774)   magnesium sulfate 4,000 mg in dextrose 5 % 250 mL IVPB (0 mg IntraVENous Stopped 11/26/22 1149)   iopamidol (ISOVUE-370) 76 % injection 75 mL (75 mLs IntraVENous Given 11/26/22 0855)   methylPREDNISolone sodium (SOLU-MEDROL) injection 125 mg (125 mg IntraVENous Given 11/26/22 0830)   ketamine (KETALAR) injection 50 mg (50 mg IntraVENous Given 11/26/22 1010)   midazolam (VERSED) injection 1 mg (1 mg IntraVENous Given 11/26/22 1009)        ED COURSE/MDM  Patient seen and evaluated. Old records reviewed. Labs and imaging reviewed and results discussed with patient. 57-year-old female with history of cryptogenic organizing pneumonia, on 3 to 4 L baseline presenting for evaluation of hypoxia, shortness of breath. She arrives on nonrebreather saturating in the 90s. No overt respiratory distress or increased work of breathing diminished lung sounds bilaterally. Patient has initiated on duo nebs, breathing treatments, steroids. Will obtain laboratory evaluation, cardiac panel, CT chest due to new oxygen requirement, recent hospitalization for evaluation of potential PE versus pneumonia. Magnesium was low at 1.6 and this will be repleted. VBG without CO2 retention. CT chest without signs of PE but does reveal large right-sided pneumothorax. Patient was prepped for right-sided pigtail catheter chest tube placement. She underwent ketamine, Versed for sedation for the procedure. She tolerated this well. Post procedure x-ray was obtained. Respirations were noted to improve from the 30s to the 20s and high teens after chest tube placement. Chest tube was retracted about a centimeter and repeat x-ray was obtained which shows improved resolution of pneumothorax. Patient was admitted to [facility] ICU in stable condition. Patient was given scripts for the following medications. I counseled patient how to take these medications. Current Discharge Medication List          CLINICAL IMPRESSION  1. Pneumothorax, unspecified type    2. Dyspnea and respiratory abnormalities    3. Hypoxia    4. Hypomagnesemia        Blood pressure 105/62, pulse 78, temperature 97.8 °F (36.6 °C), temperature source Oral, resp. rate 27, height 5' 3\" (1.6 m), weight 108 lb (49 kg), SpO2 100 %, not currently breastfeeding. DISPOSITION  Armin Obando was admitted in stable condition. This chart was generated in part by using Dragon Dictation system and may contain errors related to that system including errors in grammar, punctuation, and spelling, as well as words and phrases that may be inappropriate. If there are any questions or concerns please feel free to contact the dictating provider for clarification.        Larisa Ann MD  11/26/22 1403

## 2022-11-26 NOTE — ED NOTES
Dr Nkechi Alaniz returned phone call at this time to speak with Dr Sanam Hess.      Dominguez Blankenship RN  11/26/22 2086

## 2022-11-26 NOTE — PROGRESS NOTES
RT Nebulizer Bronchodilator Protocol Note    There is a bronchodilator order in the chart from a provider indicating to follow the RT Bronchodilator Protocol and there is an Initiate RT Bronchodilator Protocol order as well (see protocol at bottom of note). CXR Findings:  XR CHEST PORTABLE    Result Date: 11/26/2022  Significantly decreased right pneumothorax. XR CHEST PORTABLE    Result Date: 11/26/2022  Small bore right pleural drainage catheter with tip in the hilar/suprahilar region. Persistent large right pneumothorax similar to chest CT of 11/26/2022. Underlying findings of advanced chronic lung disease and/or pneumonitis/atypical pneumonia throughout expanded portions of both lung fields. Comment: Findings discussed with ordering provider Dr. Marianela Hassan at 1100 hours on 11/26/2022       The findings from the last RT Protocol Assessment were as follows:  Smoking: Smoker 15 pack years or more  Respiratory Pattern: Regular pattern and RR 12-20 bpm  Breath Sounds: Slightly diminished and/or crackles  Cough: Strong, spontaneous, non-productive  Indication for Bronchodilator Therapy: Decreased or absent breath sounds  Bronchodilator Assessment Score: 3    Aerosolized bronchodilator medication orders have been revised according to the RT Nebulizer Bronchodilator Protocol below. Respiratory Therapist to perform RT Therapy Protocol Assessment initially then follow the protocol. Repeat RT Therapy Protocol Assessment PRN for score 0-3 or on second treatment, BID, and PRN for scores above 3. No Indications - adjust the frequency to every 6 hours PRN wheezing or bronchospasm, if no treatments needed after 48 hours then discontinue using Per Protocol order mode. If indication present, adjust the RT bronchodilator orders based on the Bronchodilator Assessment Score as indicated below. If a patient is on this medication at home then do not decrease Frequency below that used at home.     0-3 - enter or revise RT bronchodilator order(s) to equivalent RT Bronchodilator order with Frequency of every 4 hours PRN for wheezing or increased work of breathing using Per Protocol order mode. 4-6 - enter or revise RT Bronchodilator order(s) to two equivalent RT bronchodilator orders with one order with BID Frequency and one order with Frequency of every 4 hours PRN wheezing or increased work of breathing using Per Protocol order mode. 7-10 - enter or revise RT Bronchodilator order(s) to two equivalent RT bronchodilator orders with one order with TID Frequency and one order with Frequency of every 4 hours PRN wheezing or increased work of breathing using Per Protocol order mode. 11-13 - enter or revise RT Bronchodilator order(s) to one equivalent RT bronchodilator order with QID Frequency and an Albuterol order with Frequency of every 4 hours PRN wheezing or increased work of breathing using Per Protocol order mode. Greater than 13 - enter or revise RT Bronchodilator order(s) to one equivalent RT bronchodilator order with every 4 hours Frequency and an Albuterol order with Frequency of every 2 hours PRN wheezing or increased work of breathing using Per Protocol order mode. RT to enter RT Home Evaluation for COPD & MDI Assessment order using Per Protocol order mode.     Electronically signed by Rojelio Mina RCP on 11/26/2022 at 3:27 PM

## 2022-11-26 NOTE — ED NOTES
Patient with multiple IV attempts from night and dayshift. Patient has patent, infusing 22g in right forearm. Patient is hemodynamically stable and MD is aware.       Darron Tamayo RN  11/26/22 1725

## 2022-11-26 NOTE — ED TRIAGE NOTES
Pt arrived via EMS c/o respiratory distress. Pt was found wearing NC and NRB at home with 9 LPM with O2 saturation in the 80s, see run sheet for more information. Pt typically wears 4 LPM at home, unable to maintain above 90% without NRB at 15 at facility. Pt is alert and oriented, able to answer questions but is SOB with very little exertion. Does have persistent dry cough as well.

## 2022-11-26 NOTE — PROGRESS NOTES
Pt arrived to ICU room #12 from [facility] ED via stretcher on NRB. Pt refusing 4 eye skin assessment at this time. Pt has R sided chest tube- site WNL & hooked to continuous suction -20, per order. Pt has #22 PIV to R FA pt states \"I'm a very difficult IV stick\". Call light in reach. Bed alarm on.    Damaris Butler RN, BSN

## 2022-11-27 ENCOUNTER — APPOINTMENT (OUTPATIENT)
Dept: GENERAL RADIOLOGY | Age: 67
DRG: 199 | End: 2022-11-27
Payer: MEDICARE

## 2022-11-27 PROBLEM — R06.00 DYSPNEA AND RESPIRATORY ABNORMALITIES: Status: ACTIVE | Noted: 2019-08-09

## 2022-11-27 PROBLEM — R06.89 DYSPNEA AND RESPIRATORY ABNORMALITIES: Status: ACTIVE | Noted: 2019-08-09

## 2022-11-27 LAB
ANION GAP SERPL CALCULATED.3IONS-SCNC: 3 MMOL/L (ref 3–16)
BASOPHILS ABSOLUTE: 0 K/UL (ref 0–0.2)
BASOPHILS RELATIVE PERCENT: 0.4 %
BUN BLDV-MCNC: 11 MG/DL (ref 7–20)
CALCIUM SERPL-MCNC: 7.9 MG/DL (ref 8.3–10.6)
CHLORIDE BLD-SCNC: 102 MMOL/L (ref 99–110)
CO2: 38 MMOL/L (ref 21–32)
CREAT SERPL-MCNC: <0.5 MG/DL (ref 0.6–1.2)
EOSINOPHILS ABSOLUTE: 0.1 K/UL (ref 0–0.6)
EOSINOPHILS RELATIVE PERCENT: 1.1 %
ESTIMATED AVERAGE GLUCOSE: 111.2 MG/DL
GFR SERPL CREATININE-BSD FRML MDRD: >60 ML/MIN/{1.73_M2}
GLUCOSE BLD-MCNC: 100 MG/DL (ref 70–99)
GLUCOSE BLD-MCNC: 100 MG/DL (ref 70–99)
GLUCOSE BLD-MCNC: 104 MG/DL (ref 70–99)
GLUCOSE BLD-MCNC: 79 MG/DL (ref 70–99)
GLUCOSE BLD-MCNC: 85 MG/DL (ref 70–99)
HBA1C MFR BLD: 5.5 %
HCT VFR BLD CALC: 29.6 % (ref 36–48)
HEMOGLOBIN: 9.3 G/DL (ref 12–16)
LYMPHOCYTES ABSOLUTE: 1.9 K/UL (ref 1–5.1)
LYMPHOCYTES RELATIVE PERCENT: 28.9 %
MCH RBC QN AUTO: 25.1 PG (ref 26–34)
MCHC RBC AUTO-ENTMCNC: 31.6 G/DL (ref 31–36)
MCV RBC AUTO: 79.5 FL (ref 80–100)
MONOCYTES ABSOLUTE: 0.6 K/UL (ref 0–1.3)
MONOCYTES RELATIVE PERCENT: 9.7 %
NEUTROPHILS ABSOLUTE: 3.9 K/UL (ref 1.7–7.7)
NEUTROPHILS RELATIVE PERCENT: 59.9 %
PDW BLD-RTO: 17.9 % (ref 12.4–15.4)
PERFORMED ON: ABNORMAL
PERFORMED ON: NORMAL
PLATELET # BLD: 144 K/UL (ref 135–450)
PMV BLD AUTO: 8.1 FL (ref 5–10.5)
POTASSIUM REFLEX MAGNESIUM: 4 MMOL/L (ref 3.5–5.1)
RBC # BLD: 3.72 M/UL (ref 4–5.2)
SODIUM BLD-SCNC: 143 MMOL/L (ref 136–145)
WBC # BLD: 6.5 K/UL (ref 4–11)

## 2022-11-27 PROCEDURE — 2500000003 HC RX 250 WO HCPCS: Performed by: INTERNAL MEDICINE

## 2022-11-27 PROCEDURE — 85025 COMPLETE CBC W/AUTO DIFF WBC: CPT

## 2022-11-27 PROCEDURE — 6370000000 HC RX 637 (ALT 250 FOR IP): Performed by: INTERNAL MEDICINE

## 2022-11-27 PROCEDURE — 71045 X-RAY EXAM CHEST 1 VIEW: CPT

## 2022-11-27 PROCEDURE — 2700000000 HC OXYGEN THERAPY PER DAY

## 2022-11-27 PROCEDURE — 2580000003 HC RX 258: Performed by: INTERNAL MEDICINE

## 2022-11-27 PROCEDURE — 2580000003 HC RX 258

## 2022-11-27 PROCEDURE — 6360000002 HC RX W HCPCS: Performed by: INTERNAL MEDICINE

## 2022-11-27 PROCEDURE — 36415 COLL VENOUS BLD VENIPUNCTURE: CPT

## 2022-11-27 PROCEDURE — 2000000000 HC ICU R&B

## 2022-11-27 PROCEDURE — 99233 SBSQ HOSP IP/OBS HIGH 50: CPT | Performed by: INTERNAL MEDICINE

## 2022-11-27 PROCEDURE — 94761 N-INVAS EAR/PLS OXIMETRY MLT: CPT

## 2022-11-27 PROCEDURE — 80048 BASIC METABOLIC PNL TOTAL CA: CPT

## 2022-11-27 PROCEDURE — 99223 1ST HOSP IP/OBS HIGH 75: CPT | Performed by: INTERNAL MEDICINE

## 2022-11-27 PROCEDURE — 93005 ELECTROCARDIOGRAM TRACING: CPT | Performed by: INTERNAL MEDICINE

## 2022-11-27 PROCEDURE — 6360000002 HC RX W HCPCS

## 2022-11-27 PROCEDURE — 6370000000 HC RX 637 (ALT 250 FOR IP)

## 2022-11-27 RX ORDER — MORPHINE SULFATE 2 MG/ML
2 INJECTION, SOLUTION INTRAMUSCULAR; INTRAVENOUS
Status: DISCONTINUED | OUTPATIENT
Start: 2022-11-27 | End: 2022-11-28 | Stop reason: HOSPADM

## 2022-11-27 RX ORDER — GUAIFENESIN/DEXTROMETHORPHAN 100-10MG/5
5 SYRUP ORAL EVERY 4 HOURS PRN
Status: DISCONTINUED | OUTPATIENT
Start: 2022-11-27 | End: 2022-11-28 | Stop reason: HOSPADM

## 2022-11-27 RX ORDER — TRAMADOL HYDROCHLORIDE 50 MG/1
50 TABLET ORAL EVERY 6 HOURS PRN
Status: DISCONTINUED | OUTPATIENT
Start: 2022-11-27 | End: 2022-11-28 | Stop reason: HOSPADM

## 2022-11-27 RX ORDER — METOPROLOL TARTRATE 5 MG/5ML
5 INJECTION INTRAVENOUS ONCE
Status: COMPLETED | OUTPATIENT
Start: 2022-11-27 | End: 2022-11-27

## 2022-11-27 RX ORDER — METOPROLOL TARTRATE 5 MG/5ML
2.5 INJECTION INTRAVENOUS
Status: DISCONTINUED | OUTPATIENT
Start: 2022-11-27 | End: 2022-11-28 | Stop reason: HOSPADM

## 2022-11-27 RX ADMIN — DEXTROSE MONOHYDRATE 150 MG: 50 INJECTION, SOLUTION INTRAVENOUS at 23:44

## 2022-11-27 RX ADMIN — AMIODARONE HYDROCHLORIDE 1 MG/MIN: 50 INJECTION, SOLUTION INTRAVENOUS at 23:56

## 2022-11-27 RX ADMIN — TRAMADOL HYDROCHLORIDE 50 MG: 50 TABLET ORAL at 11:36

## 2022-11-27 RX ADMIN — MIRTAZAPINE 30 MG: 15 TABLET, FILM COATED ORAL at 20:00

## 2022-11-27 RX ADMIN — METOPROLOL SUCCINATE 25 MG: 25 TABLET, EXTENDED RELEASE ORAL at 10:16

## 2022-11-27 RX ADMIN — ENOXAPARIN SODIUM 30 MG: 100 INJECTION SUBCUTANEOUS at 10:17

## 2022-11-27 RX ADMIN — GUAIFENESIN AND DEXTROMETHORPHAN 5 ML: 100; 10 SYRUP ORAL at 20:01

## 2022-11-27 RX ADMIN — SERTRALINE 200 MG: 100 TABLET, FILM COATED ORAL at 10:16

## 2022-11-27 RX ADMIN — Medication 10 ML: at 20:01

## 2022-11-27 RX ADMIN — GUAIFENESIN AND DEXTROMETHORPHAN 5 ML: 100; 10 SYRUP ORAL at 14:56

## 2022-11-27 RX ADMIN — LEVOTHYROXINE SODIUM 125 MCG: 125 TABLET ORAL at 06:19

## 2022-11-27 RX ADMIN — ATORVASTATIN CALCIUM 40 MG: 40 TABLET, FILM COATED ORAL at 10:16

## 2022-11-27 RX ADMIN — ACETAMINOPHEN 650 MG: 325 TABLET ORAL at 18:54

## 2022-11-27 RX ADMIN — PANTOPRAZOLE SODIUM 40 MG: 40 TABLET, DELAYED RELEASE ORAL at 06:19

## 2022-11-27 RX ADMIN — METOPROLOL TARTRATE 5 MG: 5 INJECTION, SOLUTION INTRAVENOUS at 20:01

## 2022-11-27 RX ADMIN — MORPHINE SULFATE 2 MG: 2 INJECTION, SOLUTION INTRAMUSCULAR; INTRAVENOUS at 20:39

## 2022-11-27 ASSESSMENT — PAIN DESCRIPTION - LOCATION
LOCATION: CHEST
LOCATION: CHEST;RIB CAGE
LOCATION: HEAD
LOCATION: CHEST

## 2022-11-27 ASSESSMENT — PAIN - FUNCTIONAL ASSESSMENT: PAIN_FUNCTIONAL_ASSESSMENT: PREVENTS OR INTERFERES SOME ACTIVE ACTIVITIES AND ADLS

## 2022-11-27 ASSESSMENT — PAIN SCALES - GENERAL
PAINLEVEL_OUTOF10: 7
PAINLEVEL_OUTOF10: 5
PAINLEVEL_OUTOF10: 7
PAINLEVEL_OUTOF10: 4
PAINLEVEL_OUTOF10: 6
PAINLEVEL_OUTOF10: 3

## 2022-11-27 ASSESSMENT — PAIN DESCRIPTION - DESCRIPTORS
DESCRIPTORS: ACHING
DESCRIPTORS: ACHING

## 2022-11-27 ASSESSMENT — PAIN DESCRIPTION - ORIENTATION
ORIENTATION: RIGHT
ORIENTATION: RIGHT

## 2022-11-27 ASSESSMENT — PAIN SCALES - WONG BAKER: WONGBAKER_NUMERICALRESPONSE: 0

## 2022-11-27 NOTE — PROGRESS NOTES
Pulmonary Progress Note  CC: Right recurrent pneumothorax    Subjective:  some anxiety  100% spo2 on 6lpm      EXAM: BP (!) 99/54   Pulse 83   Temp 98.4 °F (36.9 °C) (Oral)   Resp 24   Ht 5' 3\" (1.6 m)   Wt 102 lb 11.2 oz (46.6 kg)   SpO2 100%   BMI 18.19 kg/m²  6lpm O2  Constitutional:  No acute distress. Eyes: PERRL. Conjunctivae anicteric. ENT: Normal nose. Normal tongue. Neck:  Trachea is midline. No thyroid tenderness. Respiratory: No accessory muscle usage. right decreased breath sounds. No wheezes. + rales. No Rhonchi. Cardiovascular: Normal S1S2. No digit clubbing. No digit cyanosis. No LE edema. Psychiatric: No anxiety or Agitation. Alert and Oriented to person, place and time.     Scheduled Meds:   atorvastatin  40 mg Oral Daily    levothyroxine  125 mcg Oral Daily    metoprolol succinate  25 mg Oral Daily    mirtazapine  30 mg Oral Nightly    pantoprazole  40 mg Oral QAM AC    sertraline  200 mg Oral Daily    sodium chloride flush  5-40 mL IntraVENous 2 times per day    enoxaparin  30 mg SubCUTAneous Daily    insulin lispro  0-4 Units SubCUTAneous TID WC    insulin lispro  0-4 Units SubCUTAneous Nightly    potassium bicarb-citric acid  40 mEq Oral Once     Continuous Infusions:   sodium chloride      dextrose       PRN Meds:  albuterol, albuterol sulfate HFA, hydrOXYzine HCl, LORazepam, sodium chloride flush, sodium chloride, potassium chloride **OR** potassium alternative oral replacement **OR** potassium chloride, magnesium sulfate, ondansetron **OR** ondansetron, polyethylene glycol, acetaminophen **OR** acetaminophen, glucose, dextrose bolus **OR** dextrose bolus, glucagon (rDNA), dextrose    Labs:  CBC:   Recent Labs     11/26/22  0720 11/27/22 0427   WBC 7.1 6.5   HGB 11.0* 9.3*   HCT 34.4* 29.6*   MCV 78.7* 79.5*    144     BMP:   Recent Labs     11/26/22  0720 11/27/22 0426    143   K 3.4* 4.0   CL 97* 102   CO2 33* 38*   BUN 11 11   CREATININE <0.5* <0.5* PCT 0.04  Rapid flu/covid negative     Chest imaging was reviewed by me and showed 11/26/22 CTPA:   1. Large right tension pneumothorax. 2. Unchanged end-stage chronic interstitial lung disease. 3. Unchanged mediastinal adenopathy, likely reactive. 11/27/22 CXR:   Impression   1. Worsening small to moderate right pneumothorax with the right percutaneous   chest tube overlying the right axilla. ASSESSMENT:  Right tension pneumothorax - recurrent  H/o right pneumothorax 9./2022  ILD: h/o chronic HP vs NSIP  Chronic hypoxic respiratory failure      PLAN:  Supplemental oxygen to maintain SaO2 >92%; wean as tolerated    Daily CXR  Continue bronchodilators  On Pirfenidone as outpatient  Increase CT to -25cm H20 suction. I removed the heimlich valve that was still in line in the tubing from the ED.   Consider a transfer for a CTS consult - she may benefit from pleurodesis for recurrent right pneumothorax

## 2022-11-27 NOTE — H&P
History and Physical      Chief Complaint   Patient presents with    Shortness of Breath        HISTORY OF PRESENT ILLNESS:     Patient is a 58-year-old white female with a history of interstitial lung disease and chronic respiratory failure. She came to emergency room with complaints of progressively increasing shortness of breath over the last several days. Patient has a prior history of pneumothorax and was admitted to hospital a few weeks ago. Work-up in the ER revealed tension pneumothorax. Emergent chest tube was placed in the right side of her chest.  Patient feels better after placement of chest tube. Patient uses 3 to 4 L of oxygen at home. Presently on 6 L. Seen by pulmonology. Patient is allergic to penicillins, cefuroxime, doxycycline, imitrex [sumatriptan], meperidine, other, sulfa antibiotics, keflex [cephalexin], and tape [adhesive tape]. Past Medical History:   Diagnosis Date    A-fib ([facility] [address]     Anxiety     Cryptogenic organizing pneumonia ([facility] [address]     Depression     GERD (gastroesophageal reflux disease)     Hyperlipidemia     On home oxygen therapy     uses O2 3L prn    Rash     Thyroid disease     hypothyroidism       Past Surgical History:   Procedure Laterality Date    ARM SURGERY Left 3/2/2020    LEFT ULNAR NERVE DECOMPRESSION AT THE ELBOW performed by Negin Higgins MD at [facility] N/A 6/27/2019    EBUS WF W/ANES.  performed by Jessica Roberson MD at [place] [address]  6/27/2019    BRONCHOSCOPY/TRANSBRONCHIAL NEEDLE BIOPSY performed by Jessica Roberson MD at [place] [address]  6/27/2019    BRONCHOSCOPY/TRANSBRONCHIAL LUNG BIOPSY performed by Jessica Roberson MD at [place] [address]  6/27/2019    BRONCHOSCOPY ALVEOLAR LAVAGE performed by Jessica Roberson MD at [place] [address]  07/10/2019    BRONCHOSCOPY N/A 7/10/2019    BRONCHOSCOPY ALVEOLAR LAVAGE performed by Jesus Murray MD at [id number] [place] [address] BRONCHOSCOPY Bilateral 08/06/2019    BRONCHOSCOPY N/A 8/6/2019    BRONCHOSCOPY ALVEOLAR LAVAGE performed by Tavo Rose MD at [facility] N/A 12/24/2019    BRONCHOSCOPY ALVEOLAR LAVAGE performed by Yumiko Nguyen MD at [facility] N/A 8/25/2022    COLONOSCOPY POLYPECTOMY SNARE/COLD BIOPSY performed by Melody Lofton MD at [address]  9/15/2022    CT GUIDED CHEST TUBE 9/15/2022 [address] SCAN    HYSTERECTOMY, TOTAL ABDOMINAL (CERVIX REMOVED)      partial       Scheduled Meds:   atorvastatin  40 mg Oral Daily    levothyroxine  125 mcg Oral Daily    metoprolol succinate  25 mg Oral Daily    mirtazapine  30 mg Oral Nightly    pantoprazole  40 mg Oral QAM AC    sertraline  200 mg Oral Daily    sodium chloride flush  5-40 mL IntraVENous 2 times per day    enoxaparin  30 mg SubCUTAneous Daily    insulin lispro  0-4 Units SubCUTAneous TID WC    insulin lispro  0-4 Units SubCUTAneous Nightly    potassium bicarb-citric acid  40 mEq Oral Once       Continuous Infusions:   sodium chloride      dextrose         PRN Meds:  traMADol, albuterol, albuterol sulfate HFA, hydrOXYzine HCl, LORazepam, sodium chloride flush, sodium chloride, potassium chloride **OR** potassium alternative oral replacement **OR** potassium chloride, magnesium sulfate, ondansetron **OR** ondansetron, polyethylene glycol, acetaminophen **OR** acetaminophen, glucose, dextrose bolus **OR** dextrose bolus, glucagon (rDNA), dextrose       reports that she has never smoked.  She has never used smokeless tobacco.    Family History   Problem Relation Age of Onset    Diabetes Mother     High Blood Pressure Mother     Asthma Sister     Other Father        Social History     Socioeconomic History    Marital status:      Spouse name: None    Number of children: 7    Years of education: None    Highest education level: None   Tobacco Use    Smoking status: Never Smokeless tobacco: Never   Vaping Use    Vaping Use: Never used   Substance and Sexual Activity    Alcohol use: No    Drug use: No    Sexual activity: Not Currently     Social Determinants of Health     Transportation Needs: No Transportation Needs    Lack of Transportation (Medical): No    Lack of Transportation (Non-Medical): No     REVIEW OF SYSTEMS:   Constitutional: Negative for fever   HENT: Negative for sore throat   Eyes: Negative for redness   Respiratory: + for dyspnea, cough   Cardiovascular: Negative for chest pain   Gastrointestinal: Negative for vomiting, diarrhea   Genitourinary: Negative for hematuria   Musculoskeletal: Negative for arthralgias   Skin: Negative for rash   Neurological: Negative for syncope   Hematological: Negative for adenopathy   Psychiatric/Behavorial: Negative for anxiety    VS:   /66   Pulse (!) 104   Temp 98.3 °F (36.8 °C) (Oral)   Resp 24   Ht 5' 3\" (1.6 m)   Wt 102 lb 11.2 oz (46.6 kg)   SpO2 94%   BMI 18.19 kg/m²     Gen: Mild distress. Alert. Eyes: PERRL. No sclera icterus. No conjunctival injection. ENT: No discharge. Pharynx clear. Neck: Trachea midline. Normal thyroid. Resp: Minimal accessory muscle use. + crackles. No wheezes. No rhonchi. No dullness on percussion. Chest tube right side  CV: Tachycardia. Regular rhythm. No murmur or rub. No edema. GI: Non-tender. Non-distended. No masses. No organomegaly. Normal bowel sounds. No hernia. Skin: Warm and dry. No nodule on exposed extremities. No rash on exposed extremities. Lymph: No cervical LAD. No supraclavicular LAD. M/S: No cyanosis. No joint deformity. No clubbing. Neuro: Awake. Reflexes 2+ symmetric bilaterally. Moves all 4 extremities, non focal  Psych: Oriented x 3. No anxiety or agitation.      CBC:   Recent Labs     11/26/22 0720 11/27/22  0427   WBC 7.1 6.5   HGB 11.0* 9.3*   HCT 34.4* 29.6*   MCV 78.7* 79.5*    144     BMP:   Recent Labs     11/26/22 0720 11/27/22  0426    143   K 3.4* 4.0   CL 97* 102   CO2 33* 38*   BUN 11 11   CREATININE <0.5* <0.5*     LIVER PROFILE:   Recent Labs     11/26/22  0720   AST 50*   ALT 13   BILITOT 0.5   ALKPHOS 129         XR CHEST 1 VIEW   Final Result   1. Worsening small to moderate right pneumothorax with the right percutaneous   chest tube overlying the right axilla. XR CHEST PORTABLE   Final Result   Significantly decreased right pneumothorax. XR CHEST PORTABLE   Final Result   Small bore right pleural drainage catheter with tip in the hilar/suprahilar   region. Persistent large right pneumothorax similar to chest CT of   11/26/2022. Underlying findings of advanced chronic lung disease and/or   pneumonitis/atypical pneumonia throughout expanded portions of both lung   fields. Comment: Findings discussed with ordering provider Dr. Trevino at 1100 hours   on 11/26/2022         CT CHEST PULMONARY EMBOLISM W CONTRAST   Final Result   1. Large right tension pneumothorax. 2. Unchanged end-stage chronic interstitial lung disease. 3. Unchanged mediastinal adenopathy, likely reactive. Findings were discussed with Michael Chavez at 9:15 am on 11/26/2022. POC [address]   Final Result      XR CHEST 1 VIEW    (Results Pending)   XR CHEST 1 VIEW    (Results Pending)        ASSESSMENT:    Principal Problem:    Tension pneumothorax  Active Problems:    Interstitial lung disease (HCC)    Severe malnutrition (HCC)    PAF (paroxysmal atrial fibrillation) (HCC)    HTN (hypertension)    COPD (chronic obstructive pulmonary disease) (HCC)    Type 2 diabetes mellitus without complication (HCC)    Acute respiratory failure with hypoxia (HCC)  Resolved Problems:    * No resolved hospital problems. *        Plan:    #Tension pneumothorax. Patient came to the ER with dyspnea. Work up showed tension pneumothorax. She is better after chest tube placement. This is recurrent pneumothorax.  She

## 2022-11-27 NOTE — PROGRESS NOTES
AM assessment completed. AM labs reviewed. Pt on 6-8 LNC. Pt SOB at rest & with minimal exertion. Pt o2 sats dropped to 79% while getting on/off the bedpan. PIV wnl. R side CT wnl -20 suction no air leak, no crepitus. Bed alarm on. Call light in reach.

## 2022-11-27 NOTE — PROGRESS NOTES
Pt A/O VSS. Assessment complete as charted. Repositioned for comfort. Call light in reach. Denies needs. Will continue to monitor. Chest tube on right side to -20 cm suction, dressing dry and intact.

## 2022-11-27 NOTE — PROGRESS NOTES
Dr. Gael Sanchez at bedside for rounds. CT suction increased to -25 from -20- will repeat CXR.    Rod Ma RN, BSN

## 2022-11-27 NOTE — PROGRESS NOTES
Spoke w/ [facility] in regards to CXR results. Dr. Shala Joshi here on floor & made aware.    Parul Aguilera RN, BSN

## 2022-11-28 ENCOUNTER — APPOINTMENT (OUTPATIENT)
Dept: GENERAL RADIOLOGY | Age: 67
DRG: 199 | End: 2022-11-28
Payer: MEDICARE

## 2022-11-28 ENCOUNTER — HOSPITAL ENCOUNTER (INPATIENT)
Age: 67
LOS: 11 days | Discharge: HOME | DRG: 199 | End: 2022-12-09
Attending: INTERNAL MEDICINE
Payer: MEDICARE

## 2022-11-28 ENCOUNTER — APPOINTMENT (OUTPATIENT)
Dept: INTERVENTIONAL RADIOLOGY/VASCULAR | Age: 67
DRG: 199 | End: 2022-11-28
Payer: MEDICARE

## 2022-11-28 ENCOUNTER — APPOINTMENT (OUTPATIENT)
Dept: CT IMAGING | Age: 67
DRG: 199 | End: 2022-11-28
Attending: INTERNAL MEDICINE
Payer: MEDICARE

## 2022-11-28 VITALS
RESPIRATION RATE: 31 BRPM | OXYGEN SATURATION: 100 % | HEART RATE: 85 BPM | HEIGHT: 63 IN | WEIGHT: 107.8 LBS | TEMPERATURE: 98.3 F | BODY MASS INDEX: 19.1 KG/M2 | DIASTOLIC BLOOD PRESSURE: 74 MMHG | SYSTOLIC BLOOD PRESSURE: 128 MMHG

## 2022-11-28 DIAGNOSIS — J93.83 SPONTANEOUS PNEUMOTHORAX: Primary | ICD-10-CM

## 2022-11-28 LAB
ANION GAP SERPL CALCULATED.3IONS-SCNC: 4 MMOL/L (ref 3–16)
BASOPHILS ABSOLUTE: 0 K/UL (ref 0–0.2)
BASOPHILS RELATIVE PERCENT: 0.3 %
BUN BLDV-MCNC: 9 MG/DL (ref 7–20)
CALCIUM SERPL-MCNC: 7.7 MG/DL (ref 8.3–10.6)
CHLORIDE BLD-SCNC: 98 MMOL/L (ref 99–110)
CO2: 37 MMOL/L (ref 21–32)
CREAT SERPL-MCNC: <0.5 MG/DL (ref 0.6–1.2)
EKG ATRIAL RATE: 127 BPM
EKG DIAGNOSIS: NORMAL
EKG Q-T INTERVAL: 312 MS
EKG QRS DURATION: 68 MS
EKG QTC CALCULATION (BAZETT): 468 MS
EKG R AXIS: 6 DEGREES
EKG T AXIS: -17 DEGREES
EKG VENTRICULAR RATE: 135 BPM
EOSINOPHILS ABSOLUTE: 0.5 K/UL (ref 0–0.6)
EOSINOPHILS RELATIVE PERCENT: 4.5 %
GFR SERPL CREATININE-BSD FRML MDRD: >60 ML/MIN/{1.73_M2}
GLUCOSE BLD-MCNC: 108 MG/DL (ref 70–99)
GLUCOSE BLD-MCNC: 207 MG/DL (ref 70–99)
GLUCOSE BLD-MCNC: 85 MG/DL (ref 70–99)
GLUCOSE BLD-MCNC: 94 MG/DL (ref 70–99)
GLUCOSE BLD-MCNC: 95 MG/DL (ref 70–99)
HCT VFR BLD CALC: 28.9 % (ref 36–48)
HEMOGLOBIN: 9.2 G/DL (ref 12–16)
INR BLD: 1.28 (ref 0.87–1.14)
LYMPHOCYTES ABSOLUTE: 1.5 K/UL (ref 1–5.1)
LYMPHOCYTES RELATIVE PERCENT: 14.5 %
MAGNESIUM: 1.8 MG/DL (ref 1.8–2.4)
MCH RBC QN AUTO: 25.5 PG (ref 26–34)
MCHC RBC AUTO-ENTMCNC: 31.9 G/DL (ref 31–36)
MCV RBC AUTO: 79.7 FL (ref 80–100)
MONOCYTES ABSOLUTE: 0.4 K/UL (ref 0–1.3)
MONOCYTES RELATIVE PERCENT: 4.3 %
NEUTROPHILS ABSOLUTE: 7.8 K/UL (ref 1.7–7.7)
NEUTROPHILS RELATIVE PERCENT: 76.4 %
PDW BLD-RTO: 17.8 % (ref 12.4–15.4)
PERFORMED ON: ABNORMAL
PERFORMED ON: NORMAL
PLATELET # BLD: 140 K/UL (ref 135–450)
PMV BLD AUTO: 8.3 FL (ref 5–10.5)
POTASSIUM REFLEX MAGNESIUM: 3.1 MMOL/L (ref 3.5–5.1)
PROTHROMBIN TIME: 15.8 SEC (ref 11.7–14.5)
RBC # BLD: 3.63 M/UL (ref 4–5.2)
SODIUM BLD-SCNC: 139 MMOL/L (ref 136–145)
WBC # BLD: 10.2 K/UL (ref 4–11)

## 2022-11-28 PROCEDURE — 99223 1ST HOSP IP/OBS HIGH 75: CPT | Performed by: NURSE PRACTITIONER

## 2022-11-28 PROCEDURE — 2580000003 HC RX 258: Performed by: INTERNAL MEDICINE

## 2022-11-28 PROCEDURE — 92526 ORAL FUNCTION THERAPY: CPT

## 2022-11-28 PROCEDURE — 02HV33Z INSERTION OF INFUSION DEVICE INTO SUPERIOR VENA CAVA, PERCUTANEOUS APPROACH: ICD-10-PCS | Performed by: RADIOLOGY

## 2022-11-28 PROCEDURE — C1769 GUIDE WIRE: HCPCS

## 2022-11-28 PROCEDURE — 6370000000 HC RX 637 (ALT 250 FOR IP)

## 2022-11-28 PROCEDURE — 6360000002 HC RX W HCPCS: Performed by: INTERNAL MEDICINE

## 2022-11-28 PROCEDURE — 94761 N-INVAS EAR/PLS OXIMETRY MLT: CPT

## 2022-11-28 PROCEDURE — 85610 PROTHROMBIN TIME: CPT

## 2022-11-28 PROCEDURE — 36573 INSJ PICC RS&I 5 YR+: CPT

## 2022-11-28 PROCEDURE — 71045 X-RAY EXAM CHEST 1 VIEW: CPT

## 2022-11-28 PROCEDURE — 36415 COLL VENOUS BLD VENIPUNCTURE: CPT

## 2022-11-28 PROCEDURE — 0W9930Z DRAINAGE OF RIGHT PLEURAL CAVITY WITH DRAINAGE DEVICE, PERCUTANEOUS APPROACH: ICD-10-PCS | Performed by: RADIOLOGY

## 2022-11-28 PROCEDURE — 99239 HOSP IP/OBS DSCHRG MGMT >30: CPT | Performed by: INTERNAL MEDICINE

## 2022-11-28 PROCEDURE — 83735 ASSAY OF MAGNESIUM: CPT

## 2022-11-28 PROCEDURE — 6370000000 HC RX 637 (ALT 250 FOR IP): Performed by: INTERNAL MEDICINE

## 2022-11-28 PROCEDURE — 80048 BASIC METABOLIC PNL TOTAL CA: CPT

## 2022-11-28 PROCEDURE — 2700000000 HC OXYGEN THERAPY PER DAY

## 2022-11-28 PROCEDURE — 99223 1ST HOSP IP/OBS HIGH 75: CPT | Performed by: INTERNAL MEDICINE

## 2022-11-28 PROCEDURE — 77012 CT SCAN FOR NEEDLE BIOPSY: CPT

## 2022-11-28 PROCEDURE — 92610 EVALUATE SWALLOWING FUNCTION: CPT

## 2022-11-28 PROCEDURE — 32551 INSERTION OF CHEST TUBE: CPT

## 2022-11-28 PROCEDURE — 6360000002 HC RX W HCPCS

## 2022-11-28 PROCEDURE — 2060000000 HC ICU INTERMEDIATE R&B

## 2022-11-28 PROCEDURE — 93010 ELECTROCARDIOGRAM REPORT: CPT | Performed by: INTERNAL MEDICINE

## 2022-11-28 PROCEDURE — 85025 COMPLETE CBC W/AUTO DIFF WBC: CPT

## 2022-11-28 PROCEDURE — C1751 CATH, INF, PER/CENT/MIDLINE: HCPCS

## 2022-11-28 PROCEDURE — 2580000003 HC RX 258

## 2022-11-28 RX ORDER — ENOXAPARIN SODIUM 100 MG/ML
40 INJECTION SUBCUTANEOUS DAILY
Status: DISCONTINUED | OUTPATIENT
Start: 2022-11-29 | End: 2022-11-28 | Stop reason: DRUGHIGH

## 2022-11-28 RX ORDER — ALBUTEROL SULFATE 90 UG/1
2 AEROSOL, METERED RESPIRATORY (INHALATION) EVERY 6 HOURS PRN
Status: DISCONTINUED | OUTPATIENT
Start: 2022-11-28 | End: 2022-12-09 | Stop reason: HOSPADM

## 2022-11-28 RX ORDER — SODIUM CHLORIDE 9 MG/ML
INJECTION, SOLUTION INTRAVENOUS PRN
Status: DISCONTINUED | OUTPATIENT
Start: 2022-11-28 | End: 2022-12-09 | Stop reason: HOSPADM

## 2022-11-28 RX ORDER — ACETAMINOPHEN 325 MG/1
650 TABLET ORAL EVERY 6 HOURS PRN
Status: DISCONTINUED | OUTPATIENT
Start: 2022-11-28 | End: 2022-12-09 | Stop reason: HOSPADM

## 2022-11-28 RX ORDER — SODIUM CHLORIDE 9 MG/ML
INJECTION, SOLUTION INTRAVENOUS CONTINUOUS
Status: DISCONTINUED | OUTPATIENT
Start: 2022-11-28 | End: 2022-11-28 | Stop reason: HOSPADM

## 2022-11-28 RX ORDER — LORAZEPAM 0.5 MG/1
0.5 TABLET ORAL EVERY 6 HOURS PRN
Status: DISCONTINUED | OUTPATIENT
Start: 2022-11-28 | End: 2022-12-09 | Stop reason: HOSPADM

## 2022-11-28 RX ORDER — ATORVASTATIN CALCIUM 40 MG/1
40 TABLET, FILM COATED ORAL DAILY
Status: DISCONTINUED | OUTPATIENT
Start: 2022-11-29 | End: 2022-12-09 | Stop reason: HOSPADM

## 2022-11-28 RX ORDER — INSULIN LISPRO 100 [IU]/ML
0-4 INJECTION, SOLUTION INTRAVENOUS; SUBCUTANEOUS NIGHTLY
Status: DISCONTINUED | OUTPATIENT
Start: 2022-11-28 | End: 2022-12-09 | Stop reason: HOSPADM

## 2022-11-28 RX ORDER — SODIUM CHLORIDE 0.9 % (FLUSH) 0.9 %
5-40 SYRINGE (ML) INJECTION PRN
Status: DISCONTINUED | OUTPATIENT
Start: 2022-11-28 | End: 2022-12-09 | Stop reason: HOSPADM

## 2022-11-28 RX ORDER — INSULIN LISPRO 100 [IU]/ML
0-8 INJECTION, SOLUTION INTRAVENOUS; SUBCUTANEOUS
Status: DISCONTINUED | OUTPATIENT
Start: 2022-11-28 | End: 2022-12-09 | Stop reason: HOSPADM

## 2022-11-28 RX ORDER — ACETAMINOPHEN 650 MG/1
650 SUPPOSITORY RECTAL EVERY 6 HOURS PRN
Status: DISCONTINUED | OUTPATIENT
Start: 2022-11-28 | End: 2022-12-09 | Stop reason: HOSPADM

## 2022-11-28 RX ORDER — BENZONATATE 100 MG/1
100 CAPSULE ORAL 3 TIMES DAILY PRN
Status: DISCONTINUED | OUTPATIENT
Start: 2022-11-28 | End: 2022-12-09 | Stop reason: HOSPADM

## 2022-11-28 RX ORDER — METOPROLOL TARTRATE 5 MG/5ML
2.5 INJECTION INTRAVENOUS
Status: DISCONTINUED | OUTPATIENT
Start: 2022-11-28 | End: 2022-12-09 | Stop reason: HOSPADM

## 2022-11-28 RX ORDER — ONDANSETRON 2 MG/ML
4 INJECTION INTRAMUSCULAR; INTRAVENOUS EVERY 6 HOURS PRN
Status: DISCONTINUED | OUTPATIENT
Start: 2022-11-28 | End: 2022-12-09 | Stop reason: HOSPADM

## 2022-11-28 RX ORDER — ENOXAPARIN SODIUM 100 MG/ML
30 INJECTION SUBCUTANEOUS DAILY
Status: DISCONTINUED | OUTPATIENT
Start: 2022-11-29 | End: 2022-12-09 | Stop reason: HOSPADM

## 2022-11-28 RX ORDER — LEVOTHYROXINE SODIUM 0.12 MG/1
125 TABLET ORAL DAILY
Status: DISCONTINUED | OUTPATIENT
Start: 2022-11-29 | End: 2022-12-09 | Stop reason: HOSPADM

## 2022-11-28 RX ORDER — MORPHINE SULFATE 2 MG/ML
2 INJECTION, SOLUTION INTRAMUSCULAR; INTRAVENOUS
Status: DISCONTINUED | OUTPATIENT
Start: 2022-11-28 | End: 2022-12-09 | Stop reason: HOSPADM

## 2022-11-28 RX ORDER — PANTOPRAZOLE SODIUM 40 MG/1
40 TABLET, DELAYED RELEASE ORAL
Status: DISCONTINUED | OUTPATIENT
Start: 2022-11-29 | End: 2022-12-09 | Stop reason: HOSPADM

## 2022-11-28 RX ORDER — MIRTAZAPINE 15 MG/1
30 TABLET, FILM COATED ORAL NIGHTLY
Status: DISCONTINUED | OUTPATIENT
Start: 2022-11-28 | End: 2022-12-09 | Stop reason: HOSPADM

## 2022-11-28 RX ORDER — ALBUTEROL SULFATE 2.5 MG/3ML
2.5 SOLUTION RESPIRATORY (INHALATION) EVERY 6 HOURS PRN
Status: DISCONTINUED | OUTPATIENT
Start: 2022-11-28 | End: 2022-12-09 | Stop reason: HOSPADM

## 2022-11-28 RX ORDER — POLYETHYLENE GLYCOL 3350 17 G/17G
17 POWDER, FOR SOLUTION ORAL DAILY PRN
Status: DISCONTINUED | OUTPATIENT
Start: 2022-11-28 | End: 2022-12-09 | Stop reason: HOSPADM

## 2022-11-28 RX ORDER — SODIUM CHLORIDE 0.9 % (FLUSH) 0.9 %
5-40 SYRINGE (ML) INJECTION EVERY 12 HOURS SCHEDULED
Status: DISCONTINUED | OUTPATIENT
Start: 2022-11-28 | End: 2022-12-09 | Stop reason: HOSPADM

## 2022-11-28 RX ORDER — DEXTROSE MONOHYDRATE 100 MG/ML
INJECTION, SOLUTION INTRAVENOUS CONTINUOUS PRN
Status: DISCONTINUED | OUTPATIENT
Start: 2022-11-28 | End: 2022-12-09 | Stop reason: HOSPADM

## 2022-11-28 RX ORDER — ONDANSETRON 4 MG/1
4 TABLET, ORALLY DISINTEGRATING ORAL EVERY 8 HOURS PRN
Status: DISCONTINUED | OUTPATIENT
Start: 2022-11-28 | End: 2022-12-09 | Stop reason: HOSPADM

## 2022-11-28 RX ORDER — SODIUM CHLORIDE 9 MG/ML
INJECTION, SOLUTION INTRAVENOUS CONTINUOUS
Status: DISCONTINUED | OUTPATIENT
Start: 2022-11-28 | End: 2022-11-30

## 2022-11-28 RX ORDER — GUAIFENESIN/DEXTROMETHORPHAN 100-10MG/5
5 SYRUP ORAL EVERY 4 HOURS PRN
Status: DISCONTINUED | OUTPATIENT
Start: 2022-11-28 | End: 2022-12-09 | Stop reason: HOSPADM

## 2022-11-28 RX ORDER — HYDROXYZINE HYDROCHLORIDE 10 MG/1
10 TABLET, FILM COATED ORAL EVERY 8 HOURS PRN
Status: DISCONTINUED | OUTPATIENT
Start: 2022-11-28 | End: 2022-12-09 | Stop reason: HOSPADM

## 2022-11-28 RX ORDER — TRAMADOL HYDROCHLORIDE 50 MG/1
50 TABLET ORAL EVERY 6 HOURS PRN
Status: DISCONTINUED | OUTPATIENT
Start: 2022-11-28 | End: 2022-12-09 | Stop reason: HOSPADM

## 2022-11-28 RX ORDER — METOPROLOL SUCCINATE 25 MG/1
25 TABLET, EXTENDED RELEASE ORAL DAILY
Status: DISCONTINUED | OUTPATIENT
Start: 2022-11-29 | End: 2022-11-28

## 2022-11-28 RX ORDER — METOPROLOL SUCCINATE 25 MG/1
25 TABLET, EXTENDED RELEASE ORAL 2 TIMES DAILY
Status: DISCONTINUED | OUTPATIENT
Start: 2022-11-28 | End: 2022-11-30

## 2022-11-28 RX ADMIN — METOPROLOL SUCCINATE 25 MG: 25 TABLET, EXTENDED RELEASE ORAL at 21:36

## 2022-11-28 RX ADMIN — MORPHINE SULFATE 2 MG: 2 INJECTION, SOLUTION INTRAMUSCULAR; INTRAVENOUS at 18:01

## 2022-11-28 RX ADMIN — MORPHINE SULFATE 2 MG: 2 INJECTION, SOLUTION INTRAMUSCULAR; INTRAVENOUS at 21:39

## 2022-11-28 RX ADMIN — DEXTROSE 0.5 MG/MIN: 5 SOLUTION INTRAVENOUS at 09:23

## 2022-11-28 RX ADMIN — ATORVASTATIN CALCIUM 40 MG: 40 TABLET, FILM COATED ORAL at 07:50

## 2022-11-28 RX ADMIN — Medication 10 ML: at 08:01

## 2022-11-28 RX ADMIN — ENOXAPARIN SODIUM 30 MG: 100 INJECTION SUBCUTANEOUS at 07:49

## 2022-11-28 RX ADMIN — ACETAMINOPHEN 650 MG: 325 TABLET ORAL at 04:07

## 2022-11-28 RX ADMIN — SERTRALINE 200 MG: 100 TABLET, FILM COATED ORAL at 07:49

## 2022-11-28 RX ADMIN — SODIUM CHLORIDE: 9 INJECTION, SOLUTION INTRAVENOUS at 16:18

## 2022-11-28 RX ADMIN — METOPROLOL SUCCINATE 25 MG: 25 TABLET, EXTENDED RELEASE ORAL at 07:49

## 2022-11-28 RX ADMIN — GUAIFENESIN AND DEXTROMETHORPHAN 5 ML: 100; 10 SYRUP ORAL at 20:52

## 2022-11-28 RX ADMIN — PANTOPRAZOLE SODIUM 40 MG: 40 TABLET, DELAYED RELEASE ORAL at 05:26

## 2022-11-28 RX ADMIN — POTASSIUM BICARBONATE 40 MEQ: 391 TABLET, EFFERVESCENT ORAL at 07:50

## 2022-11-28 RX ADMIN — SODIUM CHLORIDE: 9 INJECTION, SOLUTION INTRAVENOUS at 12:37

## 2022-11-28 RX ADMIN — SODIUM CHLORIDE, PRESERVATIVE FREE 10 ML: 5 INJECTION INTRAVENOUS at 21:39

## 2022-11-28 RX ADMIN — LEVOTHYROXINE SODIUM 125 MCG: 125 TABLET ORAL at 05:26

## 2022-11-28 RX ADMIN — LORAZEPAM 0.5 MG: 0.5 TABLET ORAL at 23:02

## 2022-11-28 RX ADMIN — MIRTAZAPINE 30 MG: 15 TABLET, FILM COATED ORAL at 21:36

## 2022-11-28 RX ADMIN — MUPIROCIN: 20 OINTMENT TOPICAL at 11:00

## 2022-11-28 ASSESSMENT — PAIN DESCRIPTION - LOCATION: LOCATION: HEAD

## 2022-11-28 ASSESSMENT — PAIN SCALES - GENERAL
PAINLEVEL_OUTOF10: 8
PAINLEVEL_OUTOF10: 6
PAINLEVEL_OUTOF10: 7
PAINLEVEL_OUTOF10: 4
PAINLEVEL_OUTOF10: 7
PAINLEVEL_OUTOF10: 2

## 2022-11-28 ASSESSMENT — PAIN DESCRIPTION - DESCRIPTORS: DESCRIPTORS: ACHING

## 2022-11-28 ASSESSMENT — PAIN - FUNCTIONAL ASSESSMENT: PAIN_FUNCTIONAL_ASSESSMENT: PREVENTS OR INTERFERES SOME ACTIVE ACTIVITIES AND ADLS

## 2022-11-28 ASSESSMENT — PAIN DESCRIPTION - ORIENTATION: ORIENTATION: MID

## 2022-11-28 NOTE — PROGRESS NOTES
Pulmonary Progress Note  CC: Right recurrent pneumothorax    Subjective:    some anxiety  100% spo2 on 4 lpm  Stable over night and weaning o2  No chest pain       EXAM: /66   Pulse 90   Temp 98.3 °F (36.8 °C) (Oral)   Resp 26   Ht 5' 3\" (1.6 m)   Wt 107 lb 12.8 oz (48.9 kg)   SpO2 93%   BMI 19.10 kg/m²  6lpm O2  Constitutional:  No acute distress. Eyes: PERRL. Conjunctivae anicteric. ENT: Normal nose. Normal tongue. Neck:  Trachea is midline. No thyroid tenderness. Respiratory: No accessory muscle usage. right decreased breath sounds. No wheezes. + rales. No Rhonchi. Cardiovascular: Normal S1S2. No digit clubbing. No digit cyanosis. No LE edema. Psychiatric: No anxiety or Agitation. Alert and Oriented to person, place and time.     Scheduled Meds:   atorvastatin  40 mg Oral Daily    levothyroxine  125 mcg Oral Daily    metoprolol succinate  25 mg Oral Daily    mirtazapine  30 mg Oral Nightly    pantoprazole  40 mg Oral QAM AC    sertraline  200 mg Oral Daily    sodium chloride flush  5-40 mL IntraVENous 2 times per day    enoxaparin  30 mg SubCUTAneous Daily    insulin lispro  0-4 Units SubCUTAneous TID WC    insulin lispro  0-4 Units SubCUTAneous Nightly    potassium bicarb-citric acid  40 mEq Oral Once     Continuous Infusions:   amiodarone 0.5 mg/min (11/28/22 0523)    sodium chloride      dextrose       PRN Meds:  traMADol, guaiFENesin-dextromethorphan, morphine, metoprolol, albuterol, albuterol sulfate HFA, hydrOXYzine HCl, LORazepam, sodium chloride flush, sodium chloride, potassium chloride **OR** potassium alternative oral replacement **OR** potassium chloride, magnesium sulfate, ondansetron **OR** ondansetron, polyethylene glycol, acetaminophen **OR** acetaminophen, glucose, dextrose bolus **OR** dextrose bolus, glucagon (rDNA), dextrose    Labs:  CBC:   Recent Labs     11/26/22  0720 11/27/22  0427 11/28/22  0444   WBC 7.1 6.5 10.2   HGB 11.0* 9.3* 9.2*   HCT 34.4* 29.6* 28.9* MCV 78.7* 79.5* 79.7*    144 140       BMP:   Recent Labs     11/26/22  0720 11/27/22  0426 11/28/22  0444    143 139   K 3.4* 4.0 3.1*   CL 97* 102 98*   CO2 33* 38* 37*   BUN 11 11 9   CREATININE <0.5* <0.5* <0.5*         PCT 0.04  Rapid flu/covid negative     Chest imaging was reviewed by me and showed 11/26/22 CTPA:   1. Large right tension pneumothorax. 2. Unchanged end-stage chronic interstitial lung disease. 3. Unchanged mediastinal adenopathy, likely reactive. 11/27/22 CXR:   Impression   1. Worsening small to moderate right pneumothorax with the right percutaneous   chest tube overlying the right axilla. ASSESSMENT:  Right tension pneumothorax - recurrent  H/o right pneumothorax 9./2022  ILD: h/o chronic HP vs NSIP  Chronic hypoxic respiratory failure      PLAN:  Discussed with Dr David Armendariz ,patient to transfer therefor VATS/pleurodesis   A fib was on amio ,stopped now as she is sinus and metoprolol PO ,  No leak from chest tube   Had pigtail   Supplemental oxygen to maintain SaO2 >92%; wean as tolerated    Daily CXR,discussed with IR ,may eb slightly increase   Continue bronchodilators  On Pirfenidone as outpatient  Increase CT to -25cm H20 suction. I removed the heimlich valve that was still in line in the tubing from the ED.   She will need anticoagulation as wesly CHADs at least JONATHAN 3 ,so consider that after VATS   [id number] Score for Atrial Fibrillation Stroke Risk   Risk   Factors  Component Value   C CHF Yes 1   H HTN Yes 1   A2 Age >= 76 No,  (73 y.o.) 0   D DM Yes 1   S2 Prior Stroke/TIA No 0   V Vascular Disease No 0   A Age 74-69 Yes,  (73 y.o.) 1   Sc Sex female 1    [id number]  Score  5   Score last updated 11/28/22 60:34 AM EST    Click here for a link to the UpToDate guideline \"Atrial Fibrillation: Anticoagulation therapy to prevent embolization    Disclaimer: Risk Score calculation is dependent on accuracy of patient problem list and past encounter diagnosis.

## 2022-11-28 NOTE — PROGRESS NOTES
Consult received for PAF however unable to evaluate patient prior to transfer to [facility]. Cardiology will see at [facility].

## 2022-11-28 NOTE — PROGRESS NOTES
Pt -160 and sustaining. Stat EKG done. Showed A Fib RVR. Paged  and MD ordered I/V Metoprolol and I/ Morphine for pain control.

## 2022-11-28 NOTE — PROGRESS NOTES
Pt arrived for image guided chest tube insertion right . Procedure explained including the risk and benefits of the procedure. All questions answered. Pt verbalizes understanding of the procedure and states no more questions. Consent confirmed. Vital signs stable. Labs, allergies, medications, and code status reviewed. No contraindications noted.      Vital Signs  Vitals:    11/28/22 1343   BP: 122/73   Pulse: 90   Resp: 22   Temp: 98.3 °F (36.8 °C)   SpO2: 99%    (vital signs in table format)        Allergies  Penicillins, Cefuroxime, Doxycycline, Imitrex [sumatriptan], Meperidine, Other, Sulfa antibiotics, Keflex [cephalexin], and Tape [adhesive tape] (allergies)    Labs  Lab Results   Component Value Date    INR 1.28 (H) 11/28/2022    PROTIME 15.8 (H) 11/28/2022     Lab Results   Component Value Date    CREATININE <0.5 (L) 11/28/2022    BUN 9 11/28/2022     11/28/2022    GFR 89 11/17/2021    K 3.1 (L) 11/28/2022    CL 98 (L) 11/28/2022    CO2 37 (H) 11/28/2022     Lab Results   Component Value Date    WBC 10.2 11/28/2022    HGB 9.2 (L) 11/28/2022    HCT 28.9 (L) 11/28/2022    MCV 79.7 (L) 11/28/2022     11/28/2022       Patient arrived on 4L NC.   PLACED ON NRB WAS 87% ON THE 4L

## 2022-11-28 NOTE — PROGRESS NOTES
PICC team at bedside. Will hang fluids and sign D/C papers as soon as they are finished with sterile procedure. Access center just called to tell us 1230 ETA for pickup.

## 2022-11-28 NOTE — CONSULTS
Department of Cardiovascular & Thoracic Surgery  History and Physical          DIAGNOSIS: recurrent right sided pneumothorax (right ptx 09/22)    CHIEF COMPLAINT:  worsening shortness of breath    History Obtained From:  patient, electronic medical record    HISTORY OF PRESENT ILLNESS:      The patient is a 69 y.o. female with significant past medical history of ILD, chronic hypoxia (on home oxygen 3-4 L at baseline), cryptogenic pna, GERD, and HLD (recently hospitalized at [facility] for anemia and SOB). She then presented to [facility] ED on 11/26 with worsening shortness of breath. During her work up a CT chest was performed and revealed a large right sided ptx. A pigtail catheter was placed and she was transferred to [facility]. She was subsequently transferred to [facility] and we have been consulted for possible surgical intervention. Past Medical History:        Diagnosis Date    A-fib ([facility] [address]     Anxiety     Cryptogenic organizing pneumonia ([facility] [address]     Depression     GERD (gastroesophageal reflux disease)     Hyperlipidemia     On home oxygen therapy     uses O2 3L prn    Rash     Thyroid disease     hypothyroidism       Past Surgical History:        Procedure Laterality Date    ARM SURGERY Left 3/2/2020    LEFT ULNAR NERVE DECOMPRESSION AT THE ELBOW performed by Fely Milligan MD at [facility] N/A 6/27/2019    EBUS WF W/ANES.  performed by En Diaz MD at [place] [address]  6/27/2019    BRONCHOSCOPY/TRANSBRONCHIAL NEEDLE BIOPSY performed by En Diaz MD at [place] [address]  6/27/2019    BRONCHOSCOPY/TRANSBRONCHIAL LUNG BIOPSY performed by En Daiz MD at [place] [address]  6/27/2019    BRONCHOSCOPY ALVEOLAR LAVAGE performed by En Diaz MD at [place] [address]  07/10/2019    BRONCHOSCOPY N/A 7/10/2019    BRONCHOSCOPY ALVEOLAR LAVAGE performed by Jose Limon MD at [place] [address] Bilateral 08/06/2019 BRONCHOSCOPY N/A 8/6/2019    BRONCHOSCOPY ALVEOLAR LAVAGE performed by Aftab Torres MD at [address] N/A 12/24/2019    BRONCHOSCOPY ALVEOLAR LAVAGE [facility] 8/25[facility]    COLONOSCOPY POLYPECTOMY SNARE/COLD BIOPSY performed by Layo Motley MD at [address] [place]  9/15/2022    CT GUIDED CHEST TUBE 9/15/2022 [address] SCAN    HYSTERECTOMY, TOTAL ABDOMINAL (CERVIX REMOVED)      partial       Medications Prior to Admission:   Medications Prior to Admission: JARDIANCE 25 MG tablet,   pantoprazole (PROTONIX) 40 MG tablet, TAKE 1 TABLET EVERY MORNING BEFORE BREAKFAST  sertraline (ZOLOFT) 100 MG tablet, TAKE 2 TABLETS EVERY DAY  mirtazapine (REMERON) 30 MG tablet, TAKE 1 TABLET EVERY NIGHT  Pirfenidone 267 MG TABS, pirfenidone daily for a week and then BID for a week and then back to TID. metoprolol succinate (TOPROL XL) 25 MG extended release tablet, Take 1 tablet by mouth daily  LORazepam (ATIVAN) 0.5 MG tablet, Take 0.5 mg by mouth every 6 hours as needed for Anxiety. traMADol (ULTRAM) 50 MG tablet, Take 50 mg by mouth every 6 hours as needed for Pain.   omeprazole (PRILOSEC) 40 MG delayed release capsule, Take 1 capsule by mouth every morning (before breakfast)  magnesium oxide (MAG-OX) 400 (240 Mg) MG tablet, Take 1 tablet by mouth daily  potassium chloride (KLOR-CON) 10 MEQ extended release tablet, Take 1 tablet by mouth 2 times daily (Patient not taking: Reported on 10/20/2022)  polycarbophil (FIBERCON) 625 MG tablet, Take 1 tablet by mouth at bedtime (Patient not taking: Reported on 10/20/2022)  albuterol sulfate HFA (PROVENTIL;VENTOLIN;PROAIR) 108 (90 Base) MCG/ACT inhaler, INHALE 2 PUFFS INTO THE LUNGS EVERY 6 HOURS AS NEEDED FOR WHEEZING  atorvastatin (LIPITOR) 40 MG tablet, Take 1 tablet by mouth daily  levothyroxine (SYNTHROID) 125 MCG tablet, Take 1 tablet by mouth Daily TAKE 1 Bill For [facility] Tray: no TABLET BY MOUTH EVERY DAY  loratadine-pseudoephedrine ([employer]S ALLERGY RELIEF-D12) 5-120 MG per extended release tablet, TAKE 1 TABLET BY MOUTH TWICE A DAY  benzonatate (TESSALON) 200 MG capsule, TAKE 1 CAPSULE BY MOUTH 3 TIMES A DAY AS NEEDED FOR COUGH  hydrOXYzine (ATARAX) 10 MG tablet, Take 1 tablet by mouth every 8 hours as needed for Itching TAKE 1 TO 3 TABLETS BY MOUTH 3 TIMES DAILY AS NEEDED FOR ITCHING  acetaminophen (TYLENOL) 325 MG tablet,   guaiFENesin (MUCINEX) 600 MG extended release tablet, Take 1,200 mg by mouth every morning  albuterol (PROVENTIL) (2.5 MG/3ML) 0.083% nebulizer solution, INHALE THE CONTENTS OF 1 VIAL VIA NEBULIZER EVERY 6 HOURS AS NEEDED FOR WHEEZING  Blood Glucose Monitoring Suppl (TRUE METRIX METER) PUJA, Test daily and as needed  blood glucose test strips (ASCENSIA AUTODISC VI;ONE TOUCH ULTRA TEST VI) strip, 1 each by In Vitro route daily As needed. TRUEplus Lancets 33G MISC, Use daily  Blood Glucose Calibration (TRUE METRIX LEVEL 1) Low SOLN, use as needed  INSULIN SYRINGE 1CC/29G 29G X 1/2\" 1 ML MISC, 1 each by Does not apply route 2 times daily  OXYGEN, Inhale 3.5 L into the lungs    Allergies:  Penicillins, Cefuroxime, Doxycycline, Imitrex [sumatriptan], Meperidine, Other, Sulfa antibiotics, Keflex [cephalexin], and Tape [adhesive tape]    Social History:    TOBACCO:   reports that she has never smoked. She has never used smokeless tobacco.  ETOH:   reports no history of alcohol use. CAFFEINE ABUSE:  No  DRUGS:   reports no history of drug use. LIFESTYLE: somewhat sedentary     MARITAL STATUS:    OCCUPATION:  Retired     Family History:        Problem Relation Age of Onset    Diabetes Mother     High Blood Pressure Mother     Asthma Sister     Other Father        REVIEW OF SYSTEMS:      Constitutional:  No night sweats, headaches, weight loss. Eyes:  No glaucoma, cataracts. ENMT:  No nosebleeds, deviated septum. Cardiac:  No arrhythmias, chest pain.   Vascular:  No claudication, varicosities. GI:  No PUD, +GERD  :  No kidney stones, frequent UTIs  Musculoskeletal:  No arthritis, gout. Respiratory:  No emphysema, asthma. +ILD, chronic hypoxia (3-4 L of supplemental oxygen per baseline)  Integumentary:  No dermatitis, itching, rash. Neurological:  No stroke, TIAs, seizures. Psychiatric:  No depression, anxiety. Endocrine: No diabetes, +thyroid issues. Hematologic:  No bleeding, easy bruising. Immunologic:  No known cancer, steroid therapies. PHYSICAL EXAM:    VITALS:  /73   Pulse 90   Temp 98.3 °F (36.8 °C) (Axillary)   Resp 22   SpO2 99%     Constitutional:   Well developed and nourished female. No acute distress. No obesity. Eyes:  lids and lashes normal, pupils equal, round and reactive to light, extra ocular muscles intact, sclera clear, conjunctiva normal    Head/ENT:   normal teeth, gums, & palate. Moist mucus membranes. No cyanosis or pallor. Neck:  supple, symmetrical, trachea midline, no lymphadenopathy, no jugular venous distension, no carotid bruits and MASSES:  no masses. Lungs:  no increased work of breathing, good air exchange, no retractions and clear to auscultation, no crackles or wheezing, +diminished to the right side, scattered rhonchi. Cardiovascular:  regular rate and rhythm, S1, S2 normal, no murmur, click, rub or gallop. Apical impulse in 5th intercostal space. Pulses:  Right dorsalis pedis 2, Left dorsalis pedis 2, Right posterior tibial 1, Left Posterior tibial 1, Right radial 2, and Left radial 2. Abdomen:  normal bowel sounds, non-tender, aorta normal and bruits absent. No hepatosplenomegaly or masses. Musculoskeletal:  Back is straight and non-tender, full ROM of upper and lower extremities. No kyphosis or scoliosis. Extremities:  Warm, pink, no clubbing, cyanosis, petechiae, ischemia, or deformities. No peripheral edema.     Skin: no rashes, no ecchymoses, no petechiae, no nodules, no Billing Type: Third-Party Bill jaundice    Neurological/Psychiatric: oriented, normal mood, CN II-XII intact    DATA:    EK22 Sinus rhythm Nonspecific T wave abnormality Abnormal ECG When compared with ECG of 21-OCT-2022 12:47, Premature atrial complexes are no longer Present Borderline criteria for Inferior infarct are no longer Present Non-specific change in ST segment in Inferior leads Right atrial enlargement Confirmed by AYLA CUTLER, [address] () on 2022 2:50:19 PM      CBC with Differential:    Lab Results   Component Value Date/Time    WBC 10.2 2022 04:44 AM    RBC 3.63 2022 04:44 AM    HGB 9.2 2022 04:44 AM    HCT 28.9 2022 04:44 AM     2022 04:44 AM    MCV 79.7 2022 04:44 AM    MCH 25.5 2022 04:44 AM    MCHC 31.9 2022 04:44 AM    RDW 17.8 2022 04:44 AM    SEGSPCT 63.7 2018 10:24 AM    LYMPHOPCT 14.5 2022 04:44 AM    MONOPCT 4.3 2022 04:44 AM    BASOPCT 0.3 2022 04:44 AM    MONOSABS 0.4 2022 04:44 AM    LYMPHSABS 1.5 2022 04:44 AM    EOSABS 0.5 2022 04:44 AM    BASOSABS 0.0 2022 04:44 AM    DIFFTYPE AUTOMATED DIFFERENTIAL 2014 12:40 PM     CMP:    Lab Results   Component Value Date/Time     2022 04:44 AM    K 3.1 2022 04:44 AM    CL 98 2022 04:44 AM    CO2 37 2022 04:44 AM    BUN 9 2022 04:44 AM    CREATININE <0.5 2022 04:44 AM    GFRAA >60 2022 08:19 AM    AGRATIO 0.8 2022 07:20 AM    LABGLOM >60 2022 04:44 AM    GLUCOSE 108 2022 04:44 AM    PROT 7.2 2022 07:20 AM    LABALBU 3.3 2022 07:20 AM    LABALBU 3.8 2021 02:00 PM    CALCIUM 7.7 2022 04:44 AM    BILITOT 0.5 2022 07:20 AM    ALKPHOS 129 2022 07:20 AM    AST 50 2022 07:20 AM    ALT 13 2022 07:20 AM     Hepatic Function Panel:    Lab Results   Component Value Date/Time    ALKPHOS 129 2022 07:20 AM    ALT 13 2022 07:20 AM    AST 50 11/26/2022 07:20 AM    PROT 7.2 11/26/2022 07:20 AM    BILITOT 0.5 11/26/2022 07:20 AM    LABALBU 3.3 11/26/2022 07:20 AM    LABALBU 3.8 11/17/2021 02:00 PM       CXRAY:  11/28/22  Impression   Extra thoracic right chest tube. Persisting right pneumothorax. Given the rotation, it is difficult to assess   for tension component. Bilateral interstitial fibrosis. ECHO/ISMAEL: 5/9/22    Summary:   Technically difficult examination. Apical views are off axis secondary to pt left ribs fracture. Left ventricular systolic function is normal with ejection fraction   estimated at 55%. No regional wall motion abnormalities. There is mild concentric left ventricular hypertrophy. Indeterminate left ventricular filling pressure. Systolic pulmonary artery pressure (SPAP) is normal estimated at 22 mmHg (Right atrial pressure of 8 mmHg). CT Chest scant:  11/26/22      Impression   1. Large right tension pneumothorax. 2. Unchanged end-stage chronic interstitial lung disease. 3. Unchanged mediastinal adenopathy, likely reactive. Findings were discussed with Courtney Lizama at 9:15 am on 11/26/2022. ASSESSMENT AND PLAN:    Pt still has a right sided ptx. Original chest tube unable to be flushed, appears that it may be kinked. Discussed with Radiologist and he will place a CT guided chest tube this afternoon. Will discuss further treatment with team. CXR in the morning. Thank you for this consult.        Fe Adams, PANDA - CNP  11/28/2022  2:30 PM Expected Date Of Service: 03/25/2021

## 2022-11-28 NOTE — FLOWSHEET NOTE
11/28/22 1343   Vital Signs   Temp 98.3 °F (36.8 °C)   Temp Source Axillary   Heart Rate 90   Heart Rate Source Monitor   Resp 22   /73   MAP (Calculated) 89   MAP (mmHg) 90   BP Location Left upper arm   Patient Position Lying left side   Level of Consciousness 0   MEWS Score 2   Oxygen Therapy   SpO2 99 %   O2 Device Non-rebreather mask   O2 Flow Rate (L/min) 10 L/min   Patient admitted to room [facility] from [place]. Patient oriented to room, call light, bed rails, phone, lights and bathroom. Patient instructed about the schedule of the day including: vital sign frequency, lab draws, possible tests, frequency of MD and staff rounds, including RN/MD rounding together at bedside, daily weights, and I &O's. Patient instructed about prescribed diet, how to use 8MENU, and television. No bed alarm in place, patient aware of placement and reason. Yes Telemetry box in place, patient aware of placement and reason. Bed locked, in lowest position, side rails up 2/4, call light within reach. Will continue to monitor.

## 2022-11-28 NOTE — PROGRESS NOTES
Speech Language Pathology    SLP acknowledged order for evaluation, reviewed pt's chart, spoke with RN. RN reported pt expressed PO meds \"caught in her throat\". Pt reported feeling nauseous upon SLP entry, declined all PO trials at this time. SLP reviewed role of ST and rationale for referral -- pt verbalized understanding. SLP encouraged pt to take PO meds 1 at a time in the future or whole with puree vs TL. She verbalized understanding. ST to continue to follow and re-attempt eval at a later time.      Debbie Victor M.S. [id number] [facility]  Speech-language pathologist  [phone]

## 2022-11-28 NOTE — PROGRESS NOTES
Shift assessment complete. Pt alert and oriented but extremely anxious. While taking her morning meds pt states a pill got \"caught in her throat. \"  Speech came to see pt however she states she is feeling nauseous and doesn't want to work with them. Pt still on amio gtt at 0.5. Pt has call light within reach, bed in lowest position and side rails up x3.

## 2022-11-28 NOTE — DISCHARGE SUMMARY
Name:  Anamaria Ricci  Room:  3012/3012-01  MRN:    [id number]    IM Discharge Summary    Discharging Physician:  Tracey Brown MD    Admit: 11/26/2022    Discharge:      PCP      Viola Mendoza MD    Diagnoses and hospital course  this Admission      Acute on chronic respiratory failure  Hx of ILD on 3-4 L at home  Large Right tension pneumothorax     -pt with hx of Pulmonary fibrosis  and Hx of cryptogenic organizing pneumonia with chronic resp failure with recurrent admissions  - imaging with right sided tension pneumothorax similar to one in 9/22 likely induced by cough s.p chest tube placement and persistent  on repeat imaging. CT to water seal today - may need CT surgery involvement - pulmonary consulted with   CT surgery Dr. Conor Daily who recommends [facility] transfer   - o2 support - now on 4 L- hold off steroids     can resume  Pirfenidone   -transfer to [facility] today   - updated hospitalist at [facility]         Chronic diastolic CHF  last echo as above, from 5/6/22 EF 55%  -BNP stable   - pt was taken of diuretics recently for weight loss   - monitor for fluid overload and resume lasix as needed only      Paroxysmal Afibb with RVR - started on amio gtt overnight , now back in NSR   Resumed home BB- metoprolol 25 mg xl  Not on [facility] with recurrent anemia   Hold off now given plans for CT surgery        DM type 2  -Has been taken off diuretics and diabetic meds for progressive weight loss in the last year with pulmonary cachexia   -SSI  -continue to monitor BG     HLD  -continue statin     Hypothyroidism  -continue synthroid     #courtney Deficiency Anemia     recent  Hemorrhoidal bleed -with  colonoscopy neg  - hb at 9.2  -continue PO iron  -Hgb stable     #Depression  #Anxiety  -continue zoloft/remeron  -prn ativan     Diet: NPO  GI/DVT PX: /lovenox  CODE STATUS: full code      Pt stable for transfer to [facility]            HPI   64-year-old white female with a history of interstitial lung disease and chronic respiratory failure. She came to emergency room with complaints of progressively increasing shortness of breath over the last several days. Patient has a prior history of pneumothorax and was admitted to hospital a few weeks ago. Work-up in the ER revealed tension pneumothorax. Emergent chest tube was placed in the right side of her chest.  Patient feels better after placement of chest tube. Patient uses 3 to 4 L of oxygen at home. Presently on 6 L. Seen by pulmonology    Physical Exam at Discharge:    General:  thin elderly female chronically ill appearing  Remains in fetal position on left lateral pain   Awake, alert and oriented. Appears to be not in any distress  Mucous Membranes:  Pink , anicteric  Neck: No JVD, no carotid bruit, no thyromegaly  Chest:  diminished in right side and kenny bases with scattered crackles , right sided lateral chest tube   With no subcut emphysema   Cardiovascular:  RRR S1S2 heard, no murmurs or gallops  Abdomen:  Soft, undistended, non tender, no organomegaly, BS present  Extremities: No edema or cyanosis. Distal pulses well felt  Neurological : no focal deficits with gen weakness      Radiology (Please Review Full Report for Details)     Radiology  XR CHEST PORTABLE   Final Result   Small bore right pleural drainage catheter with tip in the hilar/suprahilar   region. Persistent large right pneumothorax similar to chest CT of   11/26/2022. Underlying findings of advanced chronic lung disease and/or   pneumonitis/atypical pneumonia throughout expanded portions of both lung   fields. Comment: Findings discussed with ordering provider Dr. Kalyan Hedrick at 1100 hours   on 11/26/2022           CT CHEST PULMONARY EMBOLISM W CONTRAST   Final Result   1. Large right tension pneumothorax. 2. Unchanged end-stage chronic interstitial lung disease. 3. Unchanged mediastinal adenopathy, likely reactive. Findings were discussed with Ruben Driver at 9:15 am on 11/26/2022.       Cxr     Extra thoracic right chest tube. Persisting right pneumothorax. Given the rotation, it is difficult to assess   for tension component. Bilateral interstitial fibrosis. Cultures:      Covid not detected     Pertinent previous results reviewed      Echocardiogram from 5/6/22   Technically difficult examination. Apical views are off axis secondary to pt   left ribs fracture. Left ventricular systolic function is normal with ejection fraction   estimated at 55%. No regional wall motion abnormalities. There is mild concentric left ventricular hypertrophy. Indeterminate left ventricular filling pressure. Systolic pulmonary artery pressure (SPAP) is normal estimated at 22 mmHg   (Right atrial pressure of 8 mmHg). Consults:    1.  Pulmonary                 Recent Labs     11/26/22  0720 11/27/22 0427 11/28/22  0444   WBC 7.1 6.5 10.2   HGB 11.0* 9.3* 9.2*   HCT 34.4* 29.6* 28.9*   MCV 78.7* 79.5* 79.7*    144 140       Recent Labs     11/26/22  0720 11/27/22 0426 11/28/22  0444    143 139   K 3.4* 4.0 3.1*   CL 97* 102 98*   CO2 33* 38* 37*   BUN 11 11 9   CREATININE <0.5* <0.5* <0.5*       CBC:  Lab Results   Component Value Date/Time    WBC 10.2 11/28/2022 04:44 AM    HGB 9.2 11/28/2022 04:44 AM    HCT 28.9 11/28/2022 04:44 AM    MCV 79.7 11/28/2022 04:44 AM     11/28/2022 04:44 AM    NEUTOPHILPCT 76.4 11/28/2022 04:44 AM    LYMPHOPCT 14.5 11/28/2022 04:44 AM    MONOPCT 4.3 11/28/2022 04:44 AM    BASOPCT 0.3 11/28/2022 04:44 AM    NEUTROABS 7.8 11/28/2022 04:44 AM    LYMPHSABS 1.5 11/28/2022 04:44 AM    MONOSABS 0.4 11/28/2022 04:44 AM    EOSABS 0.5 11/28/2022 04:44 AM    BASOSABS 0.0 11/28/2022 04:44 AM     BNP:   Lab Results   Component Value Date/Time     11/28/2022 04:44 AM    K 3.1 11/28/2022 04:44 AM    CO2 37 11/28/2022 04:44 AM    BUN 9 11/28/2022 04:44 AM    CREATININE <0.5 11/28/2022 04:44 AM    CALCIUM 7.7 11/28/2022 04:44 AM                Medication List CONTINUE taking these medications      INSULIN SYRINGE 1CC/29G 29G X 1/2\" 1 ML Misc  1 each by Does not apply route 2 times daily     True Metrix Level 1 Low Soln  use as needed     True Metrix Meter Lindsay  Test daily and as needed     TRUEplus Lancets 33G Misc  Use daily            ASK your doctor about these medications      acetaminophen 325 MG tablet  Commonly known as: TYLENOL     * albuterol (2.5 MG/3ML) 0.083% nebulizer solution  Commonly known as: PROVENTIL  INHALE THE CONTENTS OF 1 VIAL VIA NEBULIZER EVERY 6 HOURS AS NEEDED FOR WHEEZING     * albuterol sulfate  (90 Base) MCG/ACT inhaler  Commonly known as: PROVENTIL;VENTOLIN;PROAIR  INHALE 2 PUFFS INTO THE LUNGS EVERY 6 HOURS AS NEEDED FOR WHEEZING     atorvastatin 40 MG tablet  Commonly known as: LIPITOR  Take 1 tablet by mouth daily     benzonatate 200 MG capsule  Commonly known as: TESSALON  TAKE 1 CAPSULE BY MOUTH 3 TIMES A DAY AS NEEDED FOR COUGH     blood glucose test strips strip  Commonly known as: ASCENSIA AUTODISC VI;ONE TOUCH ULTRA TEST VI  1 each by In Vitro route daily As needed.      [employer]S Allergy Relief-D12 5-120 MG per extended release tablet  Generic drug: loratadine-pseudoephedrine  TAKE 1 TABLET BY MOUTH TWICE A DAY     furosemide 40 MG tablet  Commonly known as: LASIX  Take 1 tablet by mouth daily     guaiFENesin 600 MG extended release tablet  Commonly known as: MUCINEX     hydrOXYzine HCl 10 MG tablet  Commonly known as: ATARAX  Take 1 tablet by mouth every 8 hours as needed for Itching TAKE 1 TO 3 TABLETS BY MOUTH 3 TIMES DAILY AS NEEDED FOR ITCHING     Jardiance 25 MG tablet  Generic drug: empagliflozin     levothyroxine 125 MCG tablet  Commonly known as: SYNTHROID  Take 1 tablet by mouth Daily TAKE 1 TABLET BY MOUTH EVERY DAY     LORazepam 0.5 MG tablet  Commonly known as: ATIVAN     magnesium oxide 400 (240 Mg) MG tablet  Commonly known as: MAG-OX  Take 1 tablet by mouth daily     metoprolol succinate 25 MG extended release tablet  Commonly known as: TOPROL XL  Take 1 tablet by mouth daily     mirtazapine 30 MG tablet  Commonly known as: REMERON  TAKE 1 TABLET EVERY NIGHT     omeprazole 40 MG delayed release capsule  Commonly known as: PRILOSEC  Take 1 capsule by mouth every morning (before breakfast)     OXYGEN     pantoprazole 40 MG tablet  Commonly known as: PROTONIX  TAKE 1 TABLET EVERY MORNING BEFORE BREAKFAST     Pirfenidone 267 MG Tabs  pirfenidone daily for a week and then BID for a week and then back to TID.     polycarbophil 625 MG tablet  Commonly known as: FIBERCON  Take 1 tablet by mouth at bedtime     potassium chloride 10 MEQ extended release tablet  Commonly known as: KLOR-CON  Take 1 tablet by mouth 2 times daily     sertraline 100 MG tablet  Commonly known as: ZOLOFT  TAKE 2 TABLETS EVERY DAY     SITagliptin 100 MG tablet  Commonly known as: Januvia  TAKE 1 TABLET BY MOUTH EVERY DAY     traMADol 50 MG tablet  Commonly known as: ULTRAM           * This list has 2 medication(s) that are the same as other medications prescribed for you. Read the directions carefully, and ask your doctor or other care provider to review them with you. Discharge Condition/Location: stable/[facility]       Time Spent on discharge is more than 30 minutes in the examination, evaluation, counseling and review of medications and discharge plan.       Godwin Ellsworth MD, 11/28/2022 11:15 AM SOB

## 2022-11-28 NOTE — CONSULTS
Electrophysiology Consultation   Date: 11/28/2022  Admit Date:  11/28/2022  Reason for Consultation: New onset atrial fibrillation. Consult Requesting Physician: Obdulio Mcclendon MD     No chief complaint on file. HPI:   Mrs. Zunilda Chavez is a pleasant but complex 69year old female with a medical history significant for heart failure with preserved ejection fraction, interstitial lung disease ( - Majors), hypertension, hypothyroidism, COPD, hypothyroidism, anemia secondary to internal hemorrhoids and colonic polyps, paroxysmal atrial fibrillation/flutter (patient unaware however daughter reports long history with short duration), microcytic anemia (low iron), recurrent spontaneous pneumothoraces, and diabetes mellitus type II who presents from home with acute on chronic shortness of breath thought to be driven by pneumothorax. According to patient she was in her usual state of health until Saturday when she began to suffer from acute on chronic shortness of breath. She presented to [facility] and was transferred to [facility] then [facility] for CTS evaluation for recurrent pneumothoraces. Patient denies fevers, chest pain, orthopnea, PND, lower extremity edema, abdominal swelling, chills, visual changes, headaches, sore throat, cough, abdominal pain, nausea, vomiting, bleeding, bruising, dysuria, muscle/joint pain, confusion, depression, anxiety, skin lesions, etc.    Emergency Room/Hospital Course:  Patient was evaluated in the ER on 11/26/2022. Her CMP was notable for mild hypokalemia. Her troponin enzymes were negative. Her CBC was notable for microcytic anemia (recently admitted with anemia). Her chest radiograph and CT showed a   large right pneumothorax. Prior to transfer to [facility], patient went into atrial fibrillation with RVR. Electrophysiology was consulted to help manage her new onset atrial fibrillation. Patient spontaneously converted to NSR.       Current rhythm: Normal sinus rhythm. Known history of atrial fibrillation: yes - paroxysmal  Valvular disease: No  Associated symptoms:  unknown  Heart rate is  controlled  Previous cardioversion and/or ablation: no  History of CAD: No  History of sleep apnea: No  History of ETOH abuse/drug abuse: No  History of thyroid disease: Yes  Elevated BNP: Yes  Left atrial size is Normal    Past Medical History:   Diagnosis Date    A-fib ([address]     Anxiety     Cryptogenic organizing pneumonia ([facility]     Depression     GERD (gastroesophageal reflux disease)     Hyperlipidemia     On home oxygen therapy     uses O2 3L prn    Rash     Thyroid disease     hypothyroidism        Past Surgical History:   Procedure Laterality Date    ARM SURGERY Left 3/2/2020    LEFT ULNAR NERVE DECOMPRESSION AT THE ELBOW performed by Collin Hardwick MD at [facility] N/A 6/27/2019    EBUS WF W/ANES.  performed by Virginie Marrero MD at [address] [place] [address]  6/27/2019    BRONCHOSCOPY/TRANSBRONCHIAL NEEDLE BIOPSY performed by Virginie Marrero MD at [address] [place] [address]  6/27/2019    BRONCHOSCOPY/TRANSBRONCHIAL LUNG BIOPSY performed by Virginie Marrero MD at [address] [place] [address]  6/27/2019    BRONCHOSCOPY ALVEOLAR LAVAGE performed by Virginie Marrero MD at [address] [place] [address]  07/10/2019    BRONCHOSCOPY N/A 7/10/2019    BRONCHOSCOPY ALVEOLAR LAVAGE performed by Demond Chambers MD at [address] [place] [address] Bilateral 08/06/2019    BRONCHOSCOPY N/A 8/6/2019    BRONCHOSCOPY ALVEOLAR LAVAGE performed by Mani Orozco MD at [address] [place] [address] N/A 12/24/2019    BRONCHOSCOPY ALVEOLAR LAVAGE performed by Chaka Gold MD at [address] [place] [address] N/A 8/25/2022    COLONOSCOPY POLYPECTOMY SNARE/COLD BIOPSY performed by Heather Musa MD at [address] [place] [address]  9/15/2022    CT GUIDED CHEST TUBE 9/15/2022 [address] SCAN HYSTERECTOMY, TOTAL ABDOMINAL (CERVIX REMOVED)      partial       Allergies   Allergen Reactions    Penicillins Anaphylaxis     Tolerates Meropenem. Cefuroxime      Tolerates Meropenem. Doxycycline Hives    Imitrex [Sumatriptan] Other (See Comments)     Feels like pins and needles    Meperidine     Other Other (See Comments)     Blood pressure drops, light headed    Sulfa Antibiotics Hives    Keflex [Cephalexin] Itching and Rash     Tolerates Meropenem. Tape Carry Otter Tape] Rash       Social History:  Reviewed. reports that she has never smoked. She has never used smokeless tobacco. She reports that she does not drink alcohol and does not use drugs. Family History:  Reviewed. family history includes Asthma in her sister; Diabetes in her mother; High Blood Pressure in her mother; Other in her father. No premature CAD. Review of System:  All other systems reviewed except for that noted above. Pertinent negatives and positives are:     General: negative for fever, chills   Ophthalmic ROS: negative for - eye pain or loss of vision  ENT ROS: negative for - headaches, sore throat   Respiratory: negative for - cough, sputum  Cardiovascular: Reviewed in HPI  Gastrointestinal: negative for - abdominal pain, diarrhea, N/V  Hematology: negative for - bleeding, blood clots, bruising or jaundice  Genito-Urinary:  negative for - Dysuria or incontinence  Musculoskeletal: negative for - Joint swelling, muscle pain  Neurological: negative for - confusion, dizziness, headaches   Psychiatric: No anxiety, no depression. Dermatological: negative for - rash    Physical Examination:  Vitals:    11/28/22 1700   BP:    Pulse:    Resp:    Temp:    SpO2: 100%        Intake/Output Summary (Last 24 hours) at 11/28/2022 1733  Last data filed at 11/28/2022 1701  Gross per 24 hour   Intake --   Output 0 ml   Net 0 ml     No intake/output data recorded.    Wt Readings from Last 3 Encounters:   11/28/22 107 lb 12.8 oz (48.9 kg) 10/22/22 102 lb 11.8 oz (46.6 kg)   22 116 lb 3.2 oz (52.7 kg)     Temp  Av.2 °F (36.8 °C)  Min: 97.5 °F (36.4 °C)  Max: 98.4 °F (36.9 °C)  Pulse  Av.5  Min: 82  Max: 175  BP  Min: 97/58  Max: 137/83  SpO2  Av.8 %  Min: 93 %  Max: 100 %    Telemetry: Sinus rhythm   Constitutional: Alert. Oriented to person, place, and time. No distress. Head: Normocephalic and atraumatic. Mouth/Throat: Lips appear moist. Oropharynx is clear and moist.  Eyes: Conjunctivae normal. EOM are normal.   Neck: Neck supple. No lymphadenopathy. No rigidity. No JVD present. Cardiovascular: Normal rate, regular rhythm. Normal S1&S2. Pulmonary/Chest: Bilateral respiratory sounds present. No respiratory accessory muscle use. Chest tube in place. Non-rebreather in place. Abdominal: Soft. Normal bowel sounds present. No distension, No tenderness. No splenomegaly. No hernia. Musculoskeletal: No tenderness. No edema    Neurological: Alert and oriented. Cranial nerve II-XII grossly intact, No gross deficit to touch. Skin: Skin is warm and dry. No rash, lesions, ulcerations noted. Psychiatric: No anxiety nor agitation. Labs:  Reviewed.    Recent Labs     22  0444    143 139   K 3.4* 4.0 3.1*   CL 97* 102 98*   CO2 33* 38* 37*   BUN 11 11 9   CREATININE <0.5* <0.5* <0.5*     Recent Labs     22  0720 22  0427 22  0444   WBC 7.1 6.5 10.2   HGB 11.0* 9.3* 9.2*   HCT 34.4* 29.6* 28.9*   MCV 78.7* 79.5* 79.7*    144 140     Lab Results   Component Value Date/Time    CKTOTAL 23 07/10/2019 04:11 PM    TROPONINI <0.01 2022 07:20 AM     Lab Results   Component Value Date/Time     2014 12:40 PM     Lab Results   Component Value Date/Time    PROTIME 15.8 2022 04:44 AM    PROTIME 15.2 09/15/2022 04:31 AM    PROTIME 13.3 07/10/2019 10:00 AM    INR 1.28 2022 04:44 AM    INR 1.21 09/15/2022 04:31 AM    INR 1.17 07/10/2019 10:00 AM Lab Results   Component Value Date/Time    CHOL 110 11/17/2021 02:00 PM    CHOL 182 02/17/2020 12:00 AM    HDL 51 11/17/2021 02:00 PM    TRIG 59 11/17/2021 02:00 PM       Diagnostic and imaging results reviewed. ECG: Atrial fibrillation with RVR with non-specific TW changes. Echo: 05/09/2022   Summary   Technically difficult examination. Apical views are off axis secondary to pt   left ribs fracture. Left ventricular systolic function is normal with ejection fraction   estimated at 55%. No regional wall motion abnormalities. There is mild concentric left ventricular hypertrophy. Indeterminate left ventricular filling pressure. Systolic pulmonary artery pressure (SPAP) is normal estimated at 22 mmHg   (Right atrial pressure of 8 mmHg). Cath: 08/09/2019  Findings:      LEFT HEART CATH  LM: No angiographic CAD  LAD: luminals ( <10%)  LCX: no angiographic CAD  RCA: Dominant, luminals     LVEDP: 4  LVEF: 65%     RIGHT HEART CATH  RA:  5  RV: 27/5  PA: 27/14      and mean of 19  PCWP: 7     Sats: 94%  Ao:  92%  RA: 62%  PA: 64%     CO/CI 5.19/2.68  SVR/PVR 1125/170     Assessment  1. No significant CAD  2. Normal right heart filling pressures  3. No evidence for constrictive/restrictive cardiomyopathy on simultanous left/right heart cath hemodynamics, pt was not loaded with 2L fluid but if concern remains, consider Cardiac MRI     I independently reviewed the ECG and telemetry.     Scheduled Meds:   [START ON 11/29/2022] atorvastatin  40 mg Oral Daily    [START ON 11/29/2022] levothyroxine  125 mcg Oral Daily    [START ON 11/29/2022] metoprolol succinate  25 mg Oral Daily    mirtazapine  30 mg Oral Nightly    [START ON 11/29/2022] pantoprazole  40 mg Oral QAM AC    [START ON 11/29/2022] sertraline  200 mg Oral Daily    sodium chloride flush  5-40 mL IntraVENous 2 times per day    insulin lispro  0-8 Units SubCUTAneous TID WC    insulin lispro  0-4 Units SubCUTAneous Nightly    [START ON 11/29/2022] enoxaparin  30 mg SubCUTAneous Daily     Continuous Infusions:   sodium chloride 75 mL/hr at 11/28/22 1618    dextrose      sodium chloride       PRN Meds:.guaiFENesin-dextromethorphan, hydrOXYzine HCl, LORazepam, morphine, metoprolol, traMADol, albuterol sulfate HFA, albuterol, glucose, dextrose bolus **OR** dextrose bolus, glucagon (rDNA), dextrose, sodium chloride flush, sodium chloride, ondansetron **OR** ondansetron, polyethylene glycol, acetaminophen **OR** acetaminophen     Assessment:   Patient Active Problem List    Diagnosis Date Noted    Pneumothorax 11/26/2022    Tension pneumothorax 11/26/2022    SVT (supraventricular tachycardia) ([address] 10/22/2022    PSVT (paroxysmal supraventricular tachycardia) ([address] 10/21/2022    PAF (paroxysmal atrial fibrillation) ([address] 10/21/2022    Diarrhea 10/20/2022    Pulmonary fibrosis ([address] 10/20/2022    Acute on chronic respiratory failure ([address] 10/19/2022    COVID-19 09/15/2022    Secondary spontaneous pneumothorax 09/12/2022    Pneumonia due to COVID-19 virus 09/10/2022    Primary spontaneous pneumothorax 09/10/2022    Severe malnutrition ([address] 08/17/2022    H/O Spinal surgery 08/16/2022    Hypomagnesemia 07/15/2022    Depression 07/15/2022    Chronic diastolic congestive heart failure ([address] 07/15/2022    Interstitial lung disease ([address] 07/14/2022    Burst fracture of lumbar vertebra ([address] 05/29/2022    Rib pain on left side     Elevated brain natriuretic peptide (BNP) level     Fracture of multiple ribs of left side 04/19/2022    Acute respiratory failure with hypoxia ([address] 01/24/2022    VIRGINIE (acute kidney injury) ([address]     Lumbar radiculopathy     Toxic metabolic encephalopathy 16/85/3429    GERD (gastroesophageal reflux disease)     Left wrist pain 07/02/2020    Left wrist tendinitis 07/02/2020    Acute congestive heart failure ([address] 03/06/2020    Ulnar neuropathy at elbow of left upper extremity 01/23/2020    Numbness and tingling in left hand 01/10/2020 Hyponatremia 12/23/2019    Type 2 diabetes mellitus without complication ([facility] 77/24/0702    Cryptogenic organizing pneumonia ([facility] 09/03/2019    Pneumothorax of left lung after biopsy 08/21/2019    COPD (chronic obstructive pulmonary disease) ([facility] 08/16/2019    Acute on chronic respiratory failure with hypoxemia (HCC)     Dyspnea and respiratory abnormalities 08/09/2019    Acute cystitis without hematuria     Abnormal CT of the chest     SOB (shortness of breath)     Acute on chronic respiratory failure with hypoxia (HCC)     Restrictive lung disease     Abnormal chest CT     Acute diastolic heart failure (HCC)     ILD (interstitial lung disease) ([facility]     Atypical pneumonia     Acute bronchitis with bronchospasm     Acute on chronic diastolic CHF (congestive heart failure) ([facility])     Class 2 obesity with body mass index (BMI) of 39.0 to 39.9 in adult     A-fib ([facility]     Pneumonia of both lower lobes due to infectious organism 06/16/2019    Mild single current episode of major depressive disorder ([facility] 05/02/2019    Anxiety 05/02/2019    Hypothyroidism 07/31/2018    Acute blood loss anemia 04/25/2016    Fatigue 04/25/2016    Migraine 08/04/2014    Syncope 08/04/2014    HTN (hypertension) 04/08/2014    Hyperlipidemia with target LDL less than 130 04/08/2014    Hypokalemia 04/08/2014    Insomnia 04/08/2014    Trochanteric bursitis of both hips 04/08/2014    Chest pain 04/02/2014      Active Hospital Problems    Diagnosis Date Noted    Pneumothorax [J93.9] 11/26/2022     Priority: Medium    Tension pneumothorax [J93.0] 11/26/2022     Priority: Medium     Mrs. Satinder Odonnell is a pleasant but complex 69year old female with a medical history significant for heart failure with preserved ejection fraction, interstitial lung disease ( - Majors), hypertension, hypothyroidism, COPD, hypothyroidism, anemia secondary to internal hemorrhoids and colonic polyps, paroxysmal atrial fibrillation/flutter (patient unaware however daughter reports long history with short duration), microcytic anemia (low iron), recurrent spontaneous pneumothoraces, and diabetes mellitus type II who presents from home with acute on chronic shortness of breath thought to be driven by pneumothorax. Problem List:  1. Paroxysmal atrial fibrillation ([id number] score 5). 2. Recurrent pneumothorax. 3. Acute on chronic shortness of breath. Assessment and Plan:  1. Paroxysmal atrial fibrillation ([id number] score 5). Patient is a pleasant 69year old female with a medical history significant for paroxysmal atrial fibrillation/flutter/tachycardia, interstitial lung disease, non-obstructive coronary artery disease, anemia, and recurrent spontaneous pneumothorax who presents from home with recurrent pneumothorax and had intermittent atrial fibrillation of short duration (less than 24 hours). Afib risk factors including age, HTN, obesity, inactivity and WILFRIDO were discussed with patient. Risk factor modification recommended. Patient's [id number] ( congestive heart failure, hypertension, age [70-69], sex, cerebral vascular disease, vascular disease, and diabetes mellitus) score is 5 with a stroke risk of 7.2%. We discussed oral anticoagulation to decrease the risk of thromboembolic events including stroke. Benefits and alternatives were discussed with patient. Risk of bleeding was discussed. Patient verbalized understanding. Different forms of anticoagulants including warfarin (Coumadin), Dabigatran (Pradaxa), Rivaroxaban (Xarelto), and Apixaban (Eliquis) were discussed. Patient and daughter opted against [address] [place] [address] Road given low burden with duration less than 24 hours and unclear symptoms from her atrial fibrillation. Rate control strategy options including cardioversion, atrial fibrillation ablation, pacemaker with AVN ablation, anti-arrhythmic strategy, and rate control strategy were discussed with patient.  Risks, benefits and alternative of each treatment options were explained. All questions were answered. Patient has opted for rate control and cardiac monitoring.  - I will order thyroid function testing (given history). - We will try and increase his metoprolol to BID dosing.  - No OAC per patient preference. Both she and daughter understand risks thoroughly (discussed with patient and daughter Caro Dixon was on phone]). - 4 week cardiac monitor at time of discharge. - Follow up with me as outpatient (no need to overbook). - We will sign off case however remain available to assist in any way. 2. Recurrent pneumothorax. - Per primary team.    3. Acute on chronic shortness of breath. - Per primary team.    Thank you for allowing me to participate in the care of Mariann GAYLE [place]woodton . If you have any questions/comments, please do not hesitate to contact us.     Devyn Cortez MD  Cardiac Electrophysiology  [facility]  [phone] [facility]

## 2022-11-28 NOTE — PROGRESS NOTES
Confirmed with Dr. Jay Kramer regarding patient having 2 chest tubes. Dr. Jay Kramer does want patient to have both, connected to suction at -20 on [facility].

## 2022-11-28 NOTE — PROGRESS NOTES
Speech Language Pathology  Clinical Bedside Swallow Assessment  Facility/Department: [place] [address] [facility]        Instrumentation: Not clinically indicated at this time. Will continue to monitor  Diet recommendation: IDDSI 7 Regular Solids; IDDSI 0 Thin Liquids; Meds whole with thin liquids (one pill at a time)   Risk management: upright for all intake, stay upright for at least 30 mins after intake, small bites/sips, oral care 2-3x/day to reduce adverse affects in the event of aspiration, increase physical mobility as able, alternate bites/sips, slow rate of intake, general GERD precautions, general aspiration precautions, and hold PO and contact SLP if s/s of aspiration or worsening respiratory status develop. NAME:Armin Obando  : 1955 (73 y.o.)   MRN: [id number]  ROOM: [address] [place] [address]  ADMISSION DATE: 2022  PATIENT DIAGNOSIS(ES): Pneumothorax [J93.9]  Tension pneumothorax [J93.0]  No chief complaint on file.     Patient Active Problem List    Diagnosis Date Noted    Pneumothorax 2022    Tension pneumothorax 2022    SVT (supraventricular tachycardia) ([address] 10/22/2022    PSVT (paroxysmal supraventricular tachycardia) ([address] 10/21/2022    PAF (paroxysmal atrial fibrillation) ([address] 10/21/2022    Diarrhea 10/20/2022    Pulmonary fibrosis ([address] 10/20/2022    Acute on chronic respiratory failure ([address] 10/19/2022    COVID-19 09/15/2022    Secondary spontaneous pneumothorax 2022    Pneumonia due to COVID-19 virus 09/10/2022    Primary spontaneous pneumothorax 09/10/2022    Severe malnutrition ([address] 2022    H/O Spinal surgery 2022    Hypomagnesemia 07/15/2022    Depression 07/15/2022    Chronic diastolic congestive heart failure ([address] 07/15/2022    Interstitial lung disease ([address] 2022    Burst fracture of lumbar vertebra ([address] 2022    Rib pain on left side     Elevated brain natriuretic peptide (BNP) level     Fracture of multiple ribs of left side 2022 Acute respiratory failure with hypoxia ([facility] 01/24/2022    VIRGINIE (acute kidney injury) ([facility]     Lumbar radiculopathy     Toxic metabolic encephalopathy 43/79/9256    GERD (gastroesophageal reflux disease)     Left wrist pain 07/02/2020    Left wrist tendinitis 07/02/2020    Acute congestive heart failure ([address] 03/06/2020    Ulnar neuropathy at elbow of left upper extremity 01/23/2020    Numbness and tingling in left hand 01/10/2020    Hyponatremia 12/23/2019    Type 2 diabetes mellitus without complication ([facility] 26/14/9089    Cryptogenic organizing pneumonia ([facility] 09/03/2019    Pneumothorax of left lung after biopsy 08/21/2019    COPD (chronic obstructive pulmonary disease) ([facility] 08/16/2019    Acute on chronic respiratory failure with hypoxemia (HCC)     Dyspnea and respiratory abnormalities 08/09/2019    Acute cystitis without hematuria     Abnormal CT of the chest     SOB (shortness of breath)     Acute on chronic respiratory failure with hypoxia (HCC)     Restrictive lung disease     Abnormal chest CT     Acute diastolic heart failure (HCC)     ILD (interstitial lung disease) ([address]     Atypical pneumonia     Acute bronchitis with bronchospasm     Acute on chronic diastolic CHF (congestive heart failure) ([facility])     Class 2 obesity with body mass index (BMI) of 39.0 to 39.9 in adult     A-fib ([facility]     Pneumonia of both lower lobes due to infectious organism 06/16/2019    Mild single current episode of major depressive disorder ([facility] 05/02/2019    Anxiety 05/02/2019    Hypothyroidism 07/31/2018    Acute blood loss anemia 04/25/2016    Fatigue 04/25/2016    Migraine 08/04/2014    Syncope 08/04/2014    HTN (hypertension) 04/08/2014    Hyperlipidemia with target LDL less than 130 04/08/2014    Hypokalemia 04/08/2014    Insomnia 04/08/2014    Trochanteric bursitis of both hips 04/08/2014    Chest pain 04/02/2014     Past Medical History:   Diagnosis Date    A-fib [facility])     Anxiety     Cryptogenic organizing pneumonia ([address]     Depression     GERD (gastroesophageal reflux disease)     Hyperlipidemia     On home oxygen therapy     uses O2 3L prn    Rash     Thyroid disease     hypothyroidism     Past Surgical History:   Procedure Laterality Date    ARM SURGERY Left 3/2/2020    LEFT ULNAR NERVE DECOMPRESSION AT THE ELBOW performed by Eliane Sharma MD at [facility] N/A 6/27/2019    EBUS WF W/ANES. performed by Jamel Dietz MD at [facility]  6/27/2019    BRONCHOSCOPY/TRANSBRONCHIAL NEEDLE BIOPSY performed by Jamel Dietz MD at [facility]  6/27/2019    BRONCHOSCOPY/TRANSBRONCHIAL LUNG BIOPSY performed by Jamel Dietz MD at [facility]  6/27/2019    BRONCHOSCOPY ALVEOLAR LAVAGE performed by Jamel Dietz MD at [facility]  07/10/2019    BRONCHOSCOPY N/A 7/10/2019    BRONCHOSCOPY ALVEOLAR LAVAGE performed by Balbina Martin MD at [facility] Bilateral 08/06/2019    BRONCHOSCOPY N/A 8/6/2019    BRONCHOSCOPY ALVEOLAR LAVAGE performed by Shelby Rivas MD at [facility] N/A 12/24/2019    BRONCHOSCOPY ALVEOLAR LAVAGE performed by Margareth Patterson MD at [facility] 8/25/2022    COLONOSCOPY POLYPECTOMY SNARE/COLD BIOPSY performed by Pricila Sheikh MD at [address] [facility]  9/15/2022    CT GUIDED CHEST TUBE 9/15/2022 [facility] CT SCAN    HYSTERECTOMY, TOTAL ABDOMINAL (CERVIX REMOVED)      partial     Allergies   Allergen Reactions    Penicillins Anaphylaxis     Tolerates Meropenem. Cefuroxime      Tolerates Meropenem. Doxycycline Hives    Imitrex [Sumatriptan] Other (See Comments)     Feels like pins and needles    Meperidine     Other Other (See Comments)     Blood pressure drops, light headed    Sulfa Antibiotics Hives    Keflex [Cephalexin] Itching and Rash     Tolerates Meropenem.     Tape Fili Splinter Shakila Copper Rash       DATE ONSET: 11/28/2022    Date of Evaluation: 11/28/2022   Evaluating Therapist: TEDDY Phillip    Chart Reviewed: : [x] Yes [] No    Current Diet: Diet NPO    Recent Chest Radiography: [x] Chest XR   [] CT of Chest  Date: 11/27/2022   Impressions  Impression   Decreased right pneumothorax. Unchanged right chest tube along the right   chest wall. Pain: The patient does not complain of pain     Reason for Referral  Kerri Conte was referred for a bedside swallow evaluation to assess the efficiency of their swallow function, identify signs and symptoms of aspiration and make recommendations regarding safe dietary consistencies, effective compensatory strategies, and safe eating environment. Assessment    Medical record review/interview: Per MD H&P: \"Patient is a 53-year-old white female with a history of interstitial lung disease and chronic respiratory failure. She came to emergency room with complaints of progressively increasing shortness of breath over the last several days. Patient has a prior history of pneumothorax and was admitted to hospital a few weeks ago. Work-up in the ER revealed tension pneumothorax. Emergent chest tube was placed in the right side of her chest.  Patient feels better after placement of chest tube. Patient uses 3 to 4 L of oxygen at home. Presently on 6 L. Seen by pulmonology. Patient is allergic to penicillins, cefuroxime, doxycycline, imitrex [sumatriptan], meperidine, other, sulfa antibiotics, keflex [cephalexin], and tape [adhesive tape]. \"    Predisposing dysphagia risk factors: GERD, Hx of dysphagia and Hx of mild high level cog deficits   Clinical signs of possible chronic dysphagia: weight loss and reduced PO intake  Precipitating dysphagia risk factors: reduced physical mobility and increased O2 demands    Patient Complaints:  Odynophagia: [] Yes [x] No  Globus Sensation: [x] Yes [] No  SOB with PO intake: [] Yes [x] No  Increased WOB with PO intake: [] Yes [x] No  Reflux Sx's: [x] Yes [] No  Weight loss: [x] Yes [] No  Coughing/Choking with PO intake: [x] Yes [] No- \"occasionally\"   Reduced Appetite: [x] Yes [] No    Additional Reported Symptoms/Complaints/Hospital Course:   Pt hx of dysphagia. Pt discharged from ARU on IDDSI 7 Regular and IDDSI 0 Thin Liquids. ARU from 08/17-09/01. Vitals/labs:   SpO2: 99%   RR: 22  BP: 122/73  HR: 90  O2 device: 10L O2 NC     CBC:   Recent Labs     11/28/22  0444   WBC 10.2   HGB 9.2*         BMP:  Recent Labs     11/28/22  0444      K 3.1*   CL 98*   CO2 37*   BUN 9   CREATININE <0.5*   GLUCOSE 108*          Cranial nerve exam:   CN V (trigeminal): ophthalmic, maxillary, and mandibular facial sensation- WNL b/l  CN VII (facial): WNL b/l  CN IX/X (glossopharyngeal/vagus): MPT: DNT; pitch range: DNT; vocal quality: Reduced; cough: Weak- perceptually, Non-Productive, and Dry  CN XII (hypoglossal): WNL b/l    Laryngeal function exam:   Secretions: oral mucosa pink and moist   Vocal quality: See CN exam above  MPT: See CN exam above  S/Z ratio: DNT   Pitch range: See CN exam above  Cough: See CN exam above    Oral Care Status:    [x] Oral Care [facility]  [] Poor oral care status  [] Edentulous  [] Upper Dentures  [] Lower Dentures  [] Missing/Broken Teeth  [] Evidence of dental cavities/carries    PO trials:   IDDSI 0 (thin): WFL     IDDSI 4 (puree): WFL     IDDSI 6 (soft and bite sized): WFL     IDDSI 7 (regular): WFL     3 oz water: PASS    Impressions:  No clinical signs of oropharyngeal dysphagia. Swallow prognosis is good. Instrumental swallow study is not indicated. Given tolerance to PO at bedside and lack of clinical s/s of aspiration at bedside, pt is safe for oral diet at this time. Instrumentation: Not clinically indicated at this time. Will continue to monitor  Diet recommendation: IDDSI 7 Regular Solids; IDDSI 0 Thin Liquids;  Meds whole with thin liquids (one pill at a time)   Risk management: upright for all intake, stay upright for at least 30 mins after intake, small bites/sips, oral care 2-3x/day to reduce adverse affects in the event of aspiration, increase physical mobility as able, alternate bites/sips, slow rate of intake, general GERD precautions, general aspiration precautions, and hold PO and contact SLP if s/s of aspiration or worsening respiratory status develop.     Prognosis: Good    Recommended Intervention:   [x] Dysphagia tx  [] Videostroboscopy                      [] NPO   [] MBS       [] Speech/Cog Eval  [] Therapeutic PO Trials     [] Ice Chips   [] Other:  [] FEES                                                 Dysphagia Therapeutic Intervention:   []  Bolus control Exercises  []  Oral Motor Exercises  []  Guzman Water Protocol  []  Thermal Stimulation  []  Oral Care    []  Vital Stim/NMES  []  Laryngeal Exercises  [x]  Patient/Family Education  []  Pharyngeal Exercises  []  Therapeutic PO trials with SLP  [x]  Diet tolerance monitoring  []  Other:     Referrals:  [] ENT    [] PT  [] Pulmonology [] GI  [] Neurology  [] RD  [] OT   []     Goals:  Short Term Goals:  Timeframe for Short Term Goals: (5 days 12/03/2022)  Goal 1: The patient will tolerate recommended diet with no clinical s/s of aspiration 5/5  Goal 2: The patient will recall/perform recommended compensatory strategies given min cues    Long Term Goals:   Timeframe for Long Term Goals: (7 days 12/05/2022)  Goal 1: The patient will tolerate least restrictive diet with no clinical s/s of aspiration or worsening respiratory/pulmonary status    Treatment:  Skilled instruction completed with patient re: evidenced based practice regarding recommendations and POC, importance of oral care to reduce adverse affects in the event of aspiration, and instruction of recommended compensatory strategies developed based upon clinical exam. Pt able to recall/demonstrate compensatory strategies with min

## 2022-11-28 NOTE — PROGRESS NOTES
Report given to [facility]. Pt taken to [facility] at this time. Pt leaving the unit in stable condition.      Electronically signed by Erica Huerta RN on 11/28/2022 at 1:01 PM

## 2022-11-28 NOTE — PROGRESS NOTES
Pt a/o. VSS. Assessment complete as charted. Repositioned for comfort. Call light in reach. Denies needs. Will continue to monitor. Pt has chest tube in the right chest, minimal drainage, to -25 cm suction.

## 2022-11-28 NOTE — CARE COORDINATION
Spoke with MD who states pt will transfer to [facility] for CT surgery. RN aware and in agreement with CM not seeing pt. Completed Interdisciplinary rounds with ICU staff.      Khushboo Finley MSW, [place]

## 2022-11-28 NOTE — PROGRESS NOTES
Image guided chest tube insertion right completed. Dr. Rita Reynoso placed 14 Thai 25 cm Angiodynamics Exodus Array multipurpose drainage catheter LOT # [id number] EXP 11/30/2023 in the right pleural space. Drain/tube secured at the 15 cm line with SUTURES. Drain/tube dressing clean, dry, and intact. Pt tolerated procedure without any signs or symptoms of distress. Report called to  RN. Pt transported back to [facility] in stable condition via bed by THIS RN.          Vital Signs  Vitals:    11/28/22 1700   BP:    Pulse:    Resp:    Temp:    SpO2: 100%    (vital signs in table format)    PT IS BACK ON HER 4L NC

## 2022-11-28 NOTE — PLAN OF CARE
SLP completed evaluation. Please refer to notes in EMR.     Kia AVILA-SLP  Clinical Fellow  [id number]

## 2022-11-29 ENCOUNTER — APPOINTMENT (OUTPATIENT)
Dept: GENERAL RADIOLOGY | Age: 67
DRG: 199 | End: 2022-11-29
Attending: INTERNAL MEDICINE
Payer: MEDICARE

## 2022-11-29 LAB
ABO/RH: NORMAL
ANION GAP SERPL CALCULATED.3IONS-SCNC: 2 MMOL/L (ref 3–16)
ANTIBODY SCREEN: NORMAL
BUN BLDV-MCNC: 9 MG/DL (ref 7–20)
CALCIUM SERPL-MCNC: 7.5 MG/DL (ref 8.3–10.6)
CHLORIDE BLD-SCNC: 101 MMOL/L (ref 99–110)
CO2: 37 MMOL/L (ref 21–32)
CREAT SERPL-MCNC: <0.5 MG/DL (ref 0.6–1.2)
ESTIMATED AVERAGE GLUCOSE: 108.3 MG/DL
GFR SERPL CREATININE-BSD FRML MDRD: >60 ML/MIN/{1.73_M2}
GLUCOSE BLD-MCNC: 109 MG/DL (ref 70–99)
GLUCOSE BLD-MCNC: 119 MG/DL (ref 70–99)
GLUCOSE BLD-MCNC: 150 MG/DL (ref 70–99)
GLUCOSE BLD-MCNC: 94 MG/DL (ref 70–99)
GLUCOSE BLD-MCNC: 96 MG/DL (ref 70–99)
HBA1C MFR BLD: 5.4 %
HCT VFR BLD CALC: 27.3 % (ref 36–48)
HEMOGLOBIN: 8.7 G/DL (ref 12–16)
MAGNESIUM: 1.7 MG/DL (ref 1.8–2.4)
MCH RBC QN AUTO: 25.4 PG (ref 26–34)
MCHC RBC AUTO-ENTMCNC: 31.7 G/DL (ref 31–36)
MCV RBC AUTO: 80.1 FL (ref 80–100)
PDW BLD-RTO: 18.5 % (ref 12.4–15.4)
PERFORMED ON: ABNORMAL
PERFORMED ON: NORMAL
PLATELET # BLD: 118 K/UL (ref 135–450)
PMV BLD AUTO: 7.6 FL (ref 5–10.5)
POTASSIUM REFLEX MAGNESIUM: 3.1 MMOL/L (ref 3.5–5.1)
RBC # BLD: 3.41 M/UL (ref 4–5.2)
SODIUM BLD-SCNC: 140 MMOL/L (ref 136–145)
TSH REFLEX FT4: 0.85 UIU/ML (ref 0.27–4.2)
WBC # BLD: 9.6 K/UL (ref 4–11)

## 2022-11-29 PROCEDURE — 84443 ASSAY THYROID STIM HORMONE: CPT

## 2022-11-29 PROCEDURE — 86850 RBC ANTIBODY SCREEN: CPT

## 2022-11-29 PROCEDURE — 2060000000 HC ICU INTERMEDIATE R&B

## 2022-11-29 PROCEDURE — 83036 HEMOGLOBIN GLYCOSYLATED A1C: CPT

## 2022-11-29 PROCEDURE — 80048 BASIC METABOLIC PNL TOTAL CA: CPT

## 2022-11-29 PROCEDURE — 86901 BLOOD TYPING SEROLOGIC RH(D): CPT

## 2022-11-29 PROCEDURE — 36415 COLL VENOUS BLD VENIPUNCTURE: CPT

## 2022-11-29 PROCEDURE — 85027 COMPLETE CBC AUTOMATED: CPT

## 2022-11-29 PROCEDURE — 2580000003 HC RX 258: Performed by: INTERNAL MEDICINE

## 2022-11-29 PROCEDURE — 94799 UNLISTED PULMONARY SVC/PX: CPT

## 2022-11-29 PROCEDURE — 86900 BLOOD TYPING SEROLOGIC ABO: CPT

## 2022-11-29 PROCEDURE — 99231 SBSQ HOSP IP/OBS SF/LOW 25: CPT | Performed by: NURSE PRACTITIONER

## 2022-11-29 PROCEDURE — 6370000000 HC RX 637 (ALT 250 FOR IP): Performed by: NURSE PRACTITIONER

## 2022-11-29 PROCEDURE — 2700000000 HC OXYGEN THERAPY PER DAY

## 2022-11-29 PROCEDURE — 83735 ASSAY OF MAGNESIUM: CPT

## 2022-11-29 PROCEDURE — 94761 N-INVAS EAR/PLS OXIMETRY MLT: CPT

## 2022-11-29 PROCEDURE — 71045 X-RAY EXAM CHEST 1 VIEW: CPT

## 2022-11-29 PROCEDURE — 6370000000 HC RX 637 (ALT 250 FOR IP): Performed by: INTERNAL MEDICINE

## 2022-11-29 RX ORDER — POTASSIUM CHLORIDE 20 MEQ/1
40 TABLET, EXTENDED RELEASE ORAL ONCE
Status: COMPLETED | OUTPATIENT
Start: 2022-11-29 | End: 2022-11-29

## 2022-11-29 RX ADMIN — TRAMADOL HYDROCHLORIDE 50 MG: 50 TABLET, COATED ORAL at 21:11

## 2022-11-29 RX ADMIN — MIRTAZAPINE 30 MG: 15 TABLET, FILM COATED ORAL at 21:11

## 2022-11-29 RX ADMIN — SERTRALINE HYDROCHLORIDE 200 MG: 50 TABLET ORAL at 08:51

## 2022-11-29 RX ADMIN — SODIUM CHLORIDE: 9 INJECTION, SOLUTION INTRAVENOUS at 05:45

## 2022-11-29 RX ADMIN — METOPROLOL SUCCINATE 25 MG: 25 TABLET, EXTENDED RELEASE ORAL at 21:11

## 2022-11-29 RX ADMIN — ATORVASTATIN CALCIUM 40 MG: 40 TABLET, FILM COATED ORAL at 21:11

## 2022-11-29 RX ADMIN — METOPROLOL SUCCINATE 25 MG: 25 TABLET, EXTENDED RELEASE ORAL at 08:51

## 2022-11-29 RX ADMIN — BENZONATATE 100 MG: 100 CAPSULE ORAL at 00:59

## 2022-11-29 RX ADMIN — LEVOTHYROXINE SODIUM 125 MCG: 0.12 TABLET ORAL at 05:45

## 2022-11-29 RX ADMIN — POTASSIUM CHLORIDE 40 MEQ: 1500 TABLET, EXTENDED RELEASE ORAL at 08:51

## 2022-11-29 RX ADMIN — PANTOPRAZOLE SODIUM 40 MG: 40 TABLET, DELAYED RELEASE ORAL at 08:51

## 2022-11-29 ASSESSMENT — PAIN SCALES - GENERAL
PAINLEVEL_OUTOF10: 0
PAINLEVEL_OUTOF10: 7
PAINLEVEL_OUTOF10: 7
PAINLEVEL_OUTOF10: 6
PAINLEVEL_OUTOF10: 5

## 2022-11-29 ASSESSMENT — PAIN SCALES - WONG BAKER: WONGBAKER_NUMERICALRESPONSE: 0

## 2022-11-29 NOTE — DISCHARGE INSTRUCTIONS
FOLLOW-UP APPOINTMENTS    Follow-up appointment on 2/7/23 at 8:30 AM with Dr Sonam Lora. [address]: [phone]. If you are unable to make this appointment, please call to reschedule [phone]. Please arrive 15 minutes early to complete necessary paperwork. Directions to [address]. [address] exit. Right off exit. Cross over [employer]. Right on [address]. Left into hospital. Follow the signs to the emergency room ( turn left toward the Emergency room). Go right at the first stop sign. Just past the Emergency room at the second stop sign turn right and go up the ramp and park on the top level if possible. Go in the glass doors of the [facility] [place] on the top level of the garage [address]. As soon as you get in the door turn left and our office is the one with the glass doors. Follow up with FRANCISCA Islas with Dr. Fer Juan in the office on 12/13/2022 at 11:10am  Please get a chest x-ray at the office prior to the appointment. Collapsed Lung: Care Instructions  Your Care Instructions     A collapsed lung (pneumothorax) is a buildup of air in the space between the lung and the chest wall. As more air builds up in this space, the pressure against the lung makes the lung collapse. This causes shortness of breath and chest pain because your lung cannot fully expand. A collapsed lung is usually caused by an injury to the chest, but it may also occur suddenly without an injury because of a lung illness, such as emphysema or lung fibrosis. Your lung may collapse after lung surgery or another medical procedure. Sometimes it happens for no known reason in an otherwise healthy person (spontaneous pneumothorax). Treatment depends on the cause of the collapse. It may heal with rest, although your doctor will want to keep track of your progress. It can take several days for the lung to expand again.  Your doctor may have drained the air with a needle or tube inserted into the space between your chest and the collapsed lung. If you have a chest tube, be sure to follow your doctor's instructions about how to care for the tube. You may need further treatment if you are not getting better. Surgery is sometimes needed to keep the lung inflated. The doctor will want to keep track of your progress, so you will need a follow-up exam within a few days. The doctor has checked you carefully, but problems can develop later. If you notice any problems or new symptoms, get medical treatment right away. Follow-up care is a key part of your treatment and safety. Be sure to make and go to all appointments, and call your doctor if you are having problems. It's also a good idea to know your test results and keep a list of the medicines you take. How can you care for yourself at home? Get plenty of rest and sleep. You may feel weak and tired for a while, but your energy level will improve with time. Hold a pillow against your chest when you cough or take deep breaths. This will support your chest and decrease your pain. Take pain medicines exactly as directed. If the doctor gave you a prescription medicine for pain, take it as prescribed. If you are not taking a prescription pain medicine, ask your doctor if you can take an over-the-counter medicine. If your doctor prescribed antibiotics, take them as directed. Do not stop taking them just because you feel better. You need to take the full course of antibiotics. If you have a bandage over your chest tube, or the place where the chest tube was inserted, keep it clean and dry. Follow your doctor's instructions on bandage care. If you go home with a tube in place, follow the doctor's directions. Do not adjust the tube in any way. This could break the seal or cause other problems. Keep the tube dry. Avoid any movements that require your muscles, especially your chest muscles, to strain.  Such movements include laughing hard, bearing down to have a bowel movement, and heavy lifting. Try not to cough. Do not fly in an airplane until your doctor tells you it is okay. Avoid any situations where there is increased air pressure. Do not smoke or allow others to smoke around you. If you need help quitting, talk to your doctor about stop-smoking programs and medicines. These can increase your chances of quitting for good. When should you call for help? Call 911 anytime you think you may need emergency care. For example, call if:    You have severe trouble breathing. You passed out (lost consciousness). Call your doctor now or seek immediate medical care if:    You have new or worse trouble breathing. You have new pain or your pain gets worse. You have a fever. You cough up blood. Your chest tube comes out or is bent or blocked. You are bleeding through the bandage where the tube was put in. Watch closely for changes in your health, and be sure to contact your doctor if:    The skin around the place where the chest tube was put in is red or irritated. You do not get better as expected. Where can you learn more? Go to https://[employer]. org and sign in to your [employer] account. Enter N260 in the [place] box to learn more about \"Collapsed Lung: Care Instructions. \"     If you do not have an account, please click on the \"Sign Up Now\" link. Current as of: March 9, 2022               Content Version: 13.4  © 2006-2022 [employer]. Care instructions adapted under license by [facility] ([facility]). If you have questions about a medical condition or this instruction, always ask your healthcare professional. [place] [address] any warranty or liability for your use of this information. Learning About a Chest Tube  What is a chest tube? A chest tube is a hollow plastic tube.  Your doctor puts the tube into the space around your lungs to drain away fluid, blood, or air. An injury to the chest or another condition may cause fluid, blood, or air to build up around your lungs. This buildup can also happen after some surgeries. Having fluid, blood, or air in the space around your lungs can cause your lung to collapse. It can also cause infections or breathing problems. How is the chest tube inserted? Putting in a chest tube is considered minor surgery, and it may be done while you're awake. You may be in the hospital or an outpatient clinic when the tube is inserted. You will be lying on your back or sitting up when it is done. Your doctor will inject medicine to numb the area of your chest where the tube will be put in. After your skin is numb, the doctor will make a small cut, called an incision, between two of your ribs. The doctor will put the tube through the incision and into the space around your lungs. The tube will stay in your chest until all or most of the fluid, blood, or air drains out. This usually takes a few days. Your doctor may attach the tube to a machine that can help the space around your lungs drain better. While the tube is in your chest, you won't be able to be very active. Your doctor may want you to stay in the hospital to get help with your chest tube, or you may be able to care for it at home. What can you expect after the tube has been removed? When the tube is removed, the doctor or nurse will tape a bandage over the incision. It will take about 3 to 4 weeks for the cut to heal completely. It may leave a small scar that will fade with time. You may have some pain in your chest from where the tube was. For most people, the pain goes away after about 2 weeks. If your doctor prescribed pain medicine, take it as prescribed. If you aren't taking a prescription pain medicine, ask your doctor if you can take an over-the counter medicine, as needed.   Your doctor may want you to have a follow-up X-ray to make sure that

## 2022-11-29 NOTE — PROGRESS NOTES
CVTS Thoracic Progress Note:          CC:  right sided pneumothorax (recurrent from 09/22)    Subj: resting in bed, breathing with ease, in nad. Obj:    Blood pressure 108/72, pulse 95, temperature 97.4 °F (36.3 °C), temperature source Axillary, resp. rate 26, weight 109 lb 6.4 oz (49.6 kg), SpO2 97 %, not currently breastfeeding. Lungs ctab   Abdomen soft, non-tender    Incision with occlusive dressing,  CDI   Chest tube with 29 ml serosanguinous drainage in last 16 hours/no airleak    Diagnostics:   Recent Labs     11/27/22  0427 11/28/22  0444 11/29/22  0555   WBC 6.5 10.2 9.6   HGB 9.3* 9.2* 8.7*   HCT 29.6* 28.9* 27.3*    140 118*                                                                  Recent Labs     11/27/22  0426 11/28/22  0444 11/29/22  0555    139 140   K 4.0 3.1* 3.1*    98* 101   CO2 38* 37* 37*   BUN 11 9 9   CREATININE <0.5* <0.5* <0.5*   GLUCOSE 85 108* 94            Recent Labs     11/28/22  0444 11/29/22  0555   MG 1.80 1.70*      Recent Labs     11/28/22  0444   INR 1.28*       CXR: 11/29/22   Impression   1. Significant improvement in the right-sided pneumothorax status post   placement of a pigtail pleural catheter. 2. Unchanged small bore chest tube within the right chest wall soft tissues. 3. Right upper extremity PICC tip projects over SVC. 4. Unchanged left-sided airspace opacities. Stable to mildly increased   right-sided airspace opacities. Assess/Plan:   Care per primary team and Pulmonology     Right ptx:   S/p 2nd chest tube placement, this one by CT guidance 11/29  CXR this AM stable with near complete resolution of ptx  Continue to suction -40 cm H2O  Plan for pleurodesis tomorrow.    NPO at midnight.   __________________________________________________________    PANDA Prince - CNP  11/29/2022  8:22 AM

## 2022-11-29 NOTE — H&P
Hospital Medicine History & Physical      PCP: Jules Foster MD    Date of Admission: 11/28/2022    Date of Service: Pt seen/examined on 11/28/2022 and Admitted to Inpatient with expected LOS greater than two midnights due to medical therapy. Chief Complaint:  SOB    History Of Present Illness:   69 y.o. female who presented to [facility] with SOB. PMHx significant for Chronic Hypoxic Respiratory Failure, Pulmonary Fibrosis, Cryptogenic Organizing Pneumonia. Initially presented to [facility] and admitted to [facility] for Tension Pneumothorax. Chest tube was placed. Transferred to [facility] for surgical evaluation. Reports increasing SOB the afternoon of transfer. No chest pain. O2 requirement increased to 10L (baseline 3-4L). Past Medical History:        Diagnosis Date    A-fib ([address]     Anxiety     Cryptogenic organizing pneumonia ([facility] [address]     Depression     GERD (gastroesophageal reflux disease)     Hyperlipidemia     On home oxygen therapy     uses O2 3L prn    Rash     Thyroid disease     hypothyroidism       Past Surgical History:        Procedure Laterality Date    ARM SURGERY Left 3/2/2020    LEFT ULNAR NERVE DECOMPRESSION AT THE ELBOW performed by Tank Bull MD at [facility] N/A 6/27/2019    EBUS WF W/ANES.  performed by Joaquin Tate MD at [facility]  6/27/2019    BRONCHOSCOPY/TRANSBRONCHIAL NEEDLE BIOPSY performed by Joaquin Tate MD at [facility]  6/27/2019    BRONCHOSCOPY/TRANSBRONCHIAL LUNG BIOPSY performed by Joaquin Tate MD at [facility]  6/27/2019    BRONCHOSCOPY ALVEOLAR LAVAGE performed by Joaquin Tate MD at [facility]  07/10/2019    BRONCHOSCOPY N/A 7/10/2019    BRONCHOSCOPY ALVEOLAR LAVAGE performed by Raciel Beasley MD at [facility] Bilateral 08/06/2019    BRONCHOSCOPY N/A 8/6/2019    BRONCHOSCOPY ALVEOLAR LAVAGE performed by [place] Mccray [place] [facility]    BRONCHOSCOPY N/A 12/24/2019    BRONCHOSCOPY ALVEOLAR LAVAGE performed by Deborah Brunson MD [facility] N/A 8/25/2022    COLONOSCOPY POLYPECTOMY SNARE/COLD BIOPSY performed by Bijan Mohan MD at [facility]  9/15/2022    CT GUIDED CHEST TUBE 9/15/2022 [facility] [place]    HYSTERECTOMY, TOTAL ABDOMINAL (CERVIX REMOVED)      partial       Medications Prior to Admission:   Prior to Admission medications    Medication Sig Start Date End Date Taking? Authorizing Provider   JARDIANCE 25 MG tablet  11/16/22   Historical Provider, MD   pantoprazole (PROTONIX) 40 MG tablet TAKE 1 TABLET EVERY MORNING BEFORE BREAKFAST 10/25/22   Lashawn Gordon MD   sertraline (ZOLOFT) 100 MG tablet TAKE 2 TABLETS EVERY DAY 10/25/22   Lashawn Gordon MD   mirtazapine (REMERON) 30 MG tablet TAKE 1 TABLET EVERY NIGHT 10/25/22   Lashawn Gordon MD   Pirfenidone 267 MG TABS pirfenidone daily for a week and then BID for a week and then back to TID. 10/22/22   Ascencion Okeefe MD   metoprolol succinate (TOPROL XL) 25 MG extended release tablet Take 1 tablet by mouth daily 10/23/22   Ascencion Okeefe MD   LORazepam (ATIVAN) 0.5 MG tablet Take 0.5 mg by mouth every 6 hours as needed for Anxiety. Historical Provider, MD   traMADol (ULTRAM) 50 MG tablet Take 50 mg by mouth every 6 hours as needed for Pain.     Historical Provider, MD   omeprazole (PRILOSEC) 40 MG delayed release capsule Take 1 capsule by mouth every morning (before breakfast) 10/3/22   Lashawn Gordon MD   magnesium oxide (MAG-OX) 400 (240 Mg) MG tablet Take 1 tablet by mouth daily 9/2/22   Margie Amador MD   potassium chloride (KLOR-CON) 10 MEQ extended release tablet Take 1 tablet by mouth 2 times daily  Patient not taking: Reported on 10/20/2022 9/1/22   Margie Amador MD   polycarbophil (FIBERCON) 625 MG tablet Take 1 tablet by mouth at bedtime  Patient not taking: Reported on 10/20/2022 9/1/22   Memo Duenas MD   albuterol sulfate HFA (PROVENTIL;VENTOLIN;PROAIR) 108 (90 Base) MCG/ACT inhaler INHALE 2 PUFFS INTO THE LUNGS EVERY 6 HOURS AS NEEDED FOR WHEEZING 7/13/22   Muneer Delphia Sandhoff, MD   atorvastatin (LIPITOR) 40 MG tablet Take 1 tablet by mouth daily 5/27/22   Malaika Adame MD   levothyroxine (SYNTHROID) 125 MCG tablet Take 1 tablet by mouth Daily TAKE 1 TABLET BY MOUTH EVERY DAY 5/27/22   Malaika Adame MD   loratadine-pseudoephedrine ([employer] ALLERGY RELIEF-D12) 5-120 MG per extended release tablet TAKE 1 TABLET BY MOUTH TWICE A DAY 5/27/22   Malaika Adame MD   benzonatate (TESSALON) 200 MG capsule TAKE 1 CAPSULE BY MOUTH 3 TIMES A DAY AS NEEDED FOR COUGH 5/27/22   Malaika Adame MD   SITagliptin (JANUVIA) 100 MG tablet TAKE 1 TABLET BY MOUTH EVERY DAY 5/27/22 9/1/22  Malaika Adame MD   hydrOXYzine (ATARAX) 10 MG tablet Take 1 tablet by mouth every 8 hours as needed for Itching TAKE 1 TO 3 TABLETS BY MOUTH 3 TIMES DAILY AS NEEDED FOR ITCHING 5/8/22   PANDA Brito CNP   furosemide (LASIX) 40 MG tablet Take 1 tablet by mouth daily 5/9/22 9/1/22  PANDA Brito CNP   acetaminophen (TYLENOL) 325 MG tablet  4/21/22   Historical Provider, MD   guaiFENesin (MUCINEX) 600 MG extended release tablet Take 1,200 mg by mouth every morning    Historical Provider, MD   albuterol (PROVENTIL) (2.5 MG/3ML) 0.083% nebulizer solution INHALE THE CONTENTS OF 1 VIAL VIA NEBULIZER EVERY 6 HOURS AS NEEDED FOR WHEEZING 2/7/22   Malaika Adame MD   Blood Glucose Monitoring Suppl (TRUE METRIX METER) PUAJ Test daily and as needed 9/10/21   Malaika Adame MD   blood glucose test strips (ASCENSIA AUTODISC VI;ONE TOUCH ULTRA TEST VI) strip 1 each by In Vitro route daily As needed.  9/10/21   Malaika Adame MD   TRUEplus Lancets 33G MISC Use daily 9/10/21   Malaika Adame MD   Blood Glucose Calibration (TRUE METRIX LEVEL 1) Low SOLN use as needed 9/9/21   Malaika Adame MD INSULIN SYRINGE 1CC/29G 29G X 1/2\" 1 ML MISC 1 each by Does not apply route 2 times daily 3/6/20   Hawa Brady MD   OXYGEN Inhale 3.5 L into the lungs 7/14/19   Historical Provider, MD       Allergies:   Penicillins, Cefuroxime, Doxycycline, Imitrex [sumatriptan], Meperidine, Other, Sulfa antibiotics, Keflex [cephalexin], and Tape [adhesive tape]    Social History:    TOBACCO:   reports that she has never smoked. She has never used smokeless tobacco.  ETOH:   reports no history of alcohol use. E-cigarette/Vaping       Questions Responses    E-cigarette/Vaping Use Never User    Start Date     Passive Exposure     Quit Date     Counseling Given Yes    Comments               Family History:    Reviewed and does not pertain to current illness/condition. REVIEW OF SYSTEMS:   Pertinent positives as noted in the HPI. All other systems reviewed with patient as able and negative. Physical Exam Performed:  /73   Pulse 90   Temp 98.3 °F (36.8 °C) (Axillary)   Resp 22   SpO2 100%   General appearance:  No apparent distress, appears stated age and cooperative. HEENT:  Pupils equal, round, and reactive to light. Conjunctivae/corneas clear. Neck:  Supple, no jugular venous distention. Trachea midline with full range of motion. Respiratory:  Normal respiratory effort. Diminished on right > left. No wheezing. Cardiovascular:  Regular rate and rhythm with normal S1/S2 without murmurs, rubs or gallops. Abdomen:  Soft, non-tender, non-distended with normal bowel sounds. Musculoskelatal:  No clubbing, cyanosis or edema bilaterally. Full range of motion without deformity. Neurologic:  Neurovascularly intact without any focal sensory/motor deficits. Cranial nerves: II-XII intact, grossly non-focal.  Psychiatric:  Alert and oriented, thought content appropriate, normal insight  Skin:  Skin color, texture, turgor normal.  No rashes or lesions.   Capillary Refill:  Brisk,< 3 seconds   Peripheral Pulses:  +2 palpable, equal bilaterally     Labs:     Recent Labs     11/26/22  0720 11/27/22  0427 11/28/22  0444   WBC 7.1 6.5 10.2   HGB 11.0* 9.3* 9.2*   HCT 34.4* 29.6* 28.9*    144 140     Recent Labs     11/26/22  0720 11/27/22  0426 11/28/22  0444    143 139   K 3.4* 4.0 3.1*   CL 97* 102 98*   CO2 33* 38* 37*   BUN 11 11 9   CREATININE <0.5* <0.5* <0.5*   CALCIUM 8.9 7.9* 7.7*     Recent Labs     11/26/22  0720   AST 50*   ALT 13   BILITOT 0.5   ALKPHOS 129     Recent Labs     11/28/22 0444   INR 1.28*     Recent Labs     11/26/22 0720   TROPONINI <0.01       Radiology:     CXR: I have reviewed the CXR with the following interpretation: Persistent right Pneumothorax with chest tube suspected to be out of place  EKG:  I have reviewed the EKG with the following interpretation: Atrial Fibrillation with RVR    XR CHEST PORTABLE    Result Date: 11/26/2022  EXAMINATION: ONE XRAY VIEW OF THE CHEST 11/26/2022 11:51 am COMPARISON: Earlier today HISTORY: Right chest tube repositioned. FINDINGS: Significantly decreased right pneumothorax status post repositioning of the chest tube. Examination is otherwise unchanged from earlier today. Significantly decreased right pneumothorax. XR CHEST PORTABLE    Result Date: 11/26/2022  EXAMINATION: ONE XRAY VIEW OF THE CHEST 11/26/2022 10:42 am COMPARISON: Chest CT 11/26/2022 HISTORY: ORDERING SYSTEM PROVIDED HISTORY: right sided pneumo, s/p right chest tube placement TECHNOLOGIST PROVIDED HISTORY: Reason for exam:->right sided pneumo, s/p right chest tube placement Reason for Exam: 1st CXR, S/P RT sided chest tube placement FINDINGS: Small bore right thoracostomy tube in with tip projecting in the right suprahilar region. Persistent large right pneumothorax. Patient is rotated to the left which distorts mediastinal structures however no definite shift or other evidence of tension in the right hemithorax.   Underlying diffuse infiltrative change and or chronic lung disease throughout the expanded portions of both lung fields. No detectable pleural effusion or definite pulmonary vascular congestion. Cardiac mediastinal silhouettes demonstrate no definite acute abnormality as visualized. Small bore right pleural drainage catheter with tip in the hilar/suprahilar region. Persistent large right pneumothorax similar to chest CT of 11/26/2022. Underlying findings of advanced chronic lung disease and/or pneumonitis/atypical pneumonia throughout expanded portions of both lung fields. Comment: Findings discussed with ordering provider Dr. Monica Austin at 1100 hours on 11/26/2022     XR CHEST PORTABLE    Result Date: 11/15/2022    ---------NAME--------- NUMBER  SEX AGE   ADMIT    DISC. XRAY#  F/C  TYPE   Addie Weathers         [id number] F   67 11/15/22                 MB4   E/R      YOB: 1955   M/R# [id number]    #: [phone]   WR-03      LOCATION:                             TRANSCRIBED: 11/15/22 22:38       CHEST PORTABLE 1V             [id number]      COMPLETED:11/16/22    23 RAW [id number]      {REASON FOR EXAMS: Dyspnea      PHYSICIAN: COOPER  ===============================================================================          Unsigned Transcriptions are preliminary reports and do not                  represent a Medical or Legal Document =============================================================================== EXAMINATION: CHEST PORTABLE 1V DATE OF EXAM:  11/15/2022 23:46 DEMOGRAPHICS: 69years old Female INDICATION: CHEST PORTABLE 1V Transport:          RM: ED COMPARISON: No existing relevant imaging study corresponding to the same anatomical region is available. TECHNIQUE: Single AP portable chest radiograph was obtained. FINDINGS: Lung volumes are low. Heart size is at the upper limits of normal.  Prominent interstitium is noted throughout the chest bilaterally and diffusely. There is no pneumothorax. No definitive consolidation is noted. Incidental note is made of Postop changes in the lumbar spine IMPRESSION: 1. This raises a question of pulmonary fibrosis. Subtle superimposed or coexisting edema cannot be excluded. This dictation was created with voice recognition software. While attempts have been made to review the dictation as it is transcribed, on occasion the spoken word can be misinterpreted by the technology leading to omissions or inappropriate words, phrases or sentences. THIS DOCUMENT HAS BEEN ELECTRONICALLY SIGNED: Vanita Espinoza MD 11/16/2022 0:38 Dictated By: Gael Gregory MD Reviewed and Electronically Signed by: ______________________________ Gael Gregory MD Signed Date: 11/15/22 22:38     XR CHEST 1 VIEW    Addendum Date: 11/28/2022    ADDENDUM: Case was discussed with Dr. Rafael Flores. 11/28/2022 0827 hours. Result Date: 11/28/2022  EXAMINATION: ONE XRAY VIEW OF THE CHEST 11/28/2022 7:32 am COMPARISON: 11/27/2022, 09/23/2022 HISTORY: ORDERING SYSTEM PROVIDED HISTORY: chest tube TECHNOLOGIST PROVIDED HISTORY: Reason for exam:->chest tube Reason for Exam: chest tube FINDINGS: Patient is moderately rotated. Thoracolumbar fusion hardware is again noted, without significant change. Right small caliber chest tube is again noted, with the tip projecting lateral to the ribs, as extrapleural. The cardiac silhouette is stable. There is extensive interstitial opacity in both lungs. Right lateral pneumothorax is again noted, measuring 3 cm at the base, similar to previous studies. There is a component of inter fissural pneumothorax as well. Extra thoracic right chest tube. Persisting right pneumothorax. Given the rotation, it is difficult to assess for tension component. Bilateral interstitial fibrosis. XR CHEST 1 VIEW    Result Date: 11/27/2022  EXAMINATION: ONE XRAY VIEW OF THE CHEST 11/27/2022 12:54 pm COMPARISON: Earlier today HISTORY: Follow-up pneumothorax.  FINDINGS: Right pneumothorax measuring 3.5 cm to the lateral pleural surface has decreased. Right chest tube remains along the right chest wall. Remainder of the examination is unchanged from earlier today. Decreased right pneumothorax. Unchanged right chest tube along the right chest wall. XR CHEST 1 VIEW    Result Date: 11/27/2022  EXAMINATION: ONE XRAY VIEW OF THE CHEST 11/27/2022 3:30 am COMPARISON: 11/26/2022 HISTORY: ORDERING SYSTEM PROVIDED HISTORY: chest tube TECHNOLOGIST PROVIDED HISTORY: Reason for exam:->chest tube Reason for Exam: chest tube FINDINGS: No change in the right chest tube projecting over for the right axilla in the extra pleural space. The right lateral small to moderate pneumothorax has increased in size. No change in the diffuse chronic interstitial lung disease. The cardiac silhouette is stable. There is no pleural effusion. Status post posterior decompression stabilization of the lumbar spine. 1. Worsening small to moderate right pneumothorax with the right percutaneous chest tube overlying the right axilla. CT CHEST PULMONARY EMBOLISM W CONTRAST    Result Date: 11/26/2022  EXAMINATION: CTA OF THE CHEST 11/26/2022 8:56 am TECHNIQUE: CTA of the chest was performed after the administration of intravenous contrast.  Multiplanar reformatted images are provided for review. MIP images are provided for review. Automated exposure control, iterative reconstruction, and/or weight based adjustment of the mA/kV was utilized to reduce the radiation dose to as low as reasonably achievable.  COMPARISON: 10/19/2022 HISTORY: ORDERING SYSTEM PROVIDED HISTORY: sob, hypoxia TECHNOLOGIST PROVIDED HISTORY: Reason for exam:->sob, hypoxia Decision Support Exception - unselect if not a suspected or confirmed emergency medical condition->Emergency Medical Condition (MA) Reason for Exam: increased SOB X few days, HX of Cryptogenic organizing pneumonia, pt recently discharged [facility] FINDINGS: Pulmonary Arteries: Motion artifact degrades image quality. There is no acute pulmonary thromboembolus. Mediastinum: The mediastinum is shifted to the left. Coronary artery calcifications are a marker of atherosclerosis. No change in the enlarged subcarinal lymph node measuring 1.2 x 2.1 cm, likely reactive. Lungs/pleura: There is a large right tension pneumothorax causing mediastinal shift to the left. There is no pleural effusion. The right lung is nearly completely collapsed. No change in the end-stage interstitial lung disease with honeycombing and ground-glass opacities throughout the left lung. Upper Abdomen: The liver is nodular in contour consistent with cirrhosis. Soft Tissues/Bones: Degenerative changes involve the thoracic spine and bilateral glenohumeral joints. No change in the appearance of the bipedicle screws status post posterior decompression stabilization of posterior decompression stabilization with a vertebral body graft transfixing an L1 burst fracture. 1. Large right tension pneumothorax. 2. Unchanged end-stage chronic interstitial lung disease. 3. Unchanged mediastinal adenopathy, likely reactive. Findings were discussed with Lito Mccormack at 9:15 am on 11/26/2022. IR PICC WO SQ PORT/PUMP > 5 YEARS    Result Date: 11/28/2022  EXAMINATION: LIMITED ULTRASOUND OF THE ARM FOR PICC ACCESS, 11/28/2022 TECHNIQUE: The PICC team used ultrasound and VPS guidance to place a PICC line. HISTORY: ORDERING SYSTEM PROVIDED HISTORY: Lack of access TECHNOLOGIST PROVIDED HISTORY: Reason for exam:->Lack of access How many lumens are being requested?->2 What site is the preferred site?->Brachial What side should this line be placed? ->Either Reason for Exam: limited access Additional signs and symptoms: 40cm, 5.5 [place] dual lumen PICC, right brachial, US/VPS guidance FLUOROSCOPY DOSE AND TYPE OR TIME AND EXPOSURES: None.  FINDINGS: Ultrasound images demonstrate patency of the right brachial vein which was used for access for placement of a PICC by the PICC team, without a radiologist present. VPS guidance was used by the PICC team By report, a 40 cm 5.5 [place] Nilam dual lumen PICC was placed. Successful placement of PICC line. POC US [facility]    Result Date: 11/26/2022  POCUS_Cardiac:     Exam Information:          Exam type:  Clinically indicated     Indication(s) for Exam:          The exam was performed with the following indications[de-identified]  Dyspnea, Concern for effusion     Views Obtained & Images Saved for These Views: The pericardial sac, myocardium, 4 chambers, and IVC were identified using the following views[de-identified]  Subxiphoid, Parasternal long-axis, Parasternal short-axis     Findings:          Pericardial Effusion:  Small pericardial effusion         Cardiac Activity:  Cardiac activity normal         LV function:  Indeterminate         IVC collapsibility:  Indeterminate     Interpretation:          Pericardial Effusion  Electronically signed by Riccardo Nicole on Saturday, November 26, 2022 at 8:08 AM : Attending:       Consults:    IP CONSULT TO CARDIOLOGY  IP CONSULT TO CARDIOTHORACIC SURGERY      ASSESSMENT:    Principal Problem:    Pneumothorax  Active Problems:    Tension pneumothorax  Resolved Problems:    * No resolved hospital problems. *      PLAN:    Principal Problem:    Pneumothorax  Active Problems:    Tension pneumothorax  Resolved Problems:    * No resolved hospital problems. *    Acute on chronic respiratory failure with Hypoxia 2/2 right tension pneumothorax   -Underlying Pulmonary Fibrosis and Cryptogenic Organizing Pneumonia  -Persistent PTX with suspicion for displaced chest tube  -CT Surgery consulted and plan to place new CT guided chest tube  -Currently on 10 L by HFNC; baseline 3-4 L. Wean as tolerated. Chronic diastolic CHF  -Appears compensated  -Medical management.      Paroxysmal Afibb with RVR - new diagnosis  -Back to NSR  -EP consulted  -Continue BB  -Check TFTs  -Considering [facility] pending surgical plans. Diabetes Mellitus, Type 2: Controlled. Continue SSI. Monitor blood sugar and adjust regimen as needed. Diabetic (Carb-controlled) diet when able. Hypothyroidism  -Check TFTs  -continue synthroid     Iron Deficiency Anemia  -Recent  Hemorrhoidal bleed - Colonoscopy negative  -Hb stable  -continue PO iron  -Monitor     Depression/Anxiety  -continue zoloft/remeron  -prn ativan     DVT Prophylaxis: Lovenox  Diet: ADULT DIET;  Regular  ADULT ORAL NUTRITION SUPPLEMENT; Breakfast, Lunch, Dinner; Standard High Calorie/High Protein Oral Supplement  Code Status: Full Code  PT/OT Eval Status: NA  Dispo - 2-3 days     Charmayne Lavender, MD

## 2022-11-29 NOTE — CARE COORDINATION
CASE MANAGEMENT INITIAL ASSESSMENT      Reviewed chart and completed assessment with patient:no  Family present: no- spoke with Pt spouse via TC  Explained Case Management role/services. Primary contact information: Lenoria Born, spouse     Health Care Decision Maker :   Primary Decision Maker: Jose Hinojosa - Spouse - [phone]    Secondary Decision Maker: Dorothea Bryanr - Child - [phone]          Can this person be reached and be able to respond quickly, such as within a few minutes or hours? Yes      Admit date/status:11/28/22  Diagnosis: Pneumothorax   Is this a Readmission?:  no- xfr from [facility] for [address] required for SNF: Yes       3 night stay required: No    Living arrangements, Adls, care needs, prior to admission:house with spouse. Wheelchair bound, transfers. Does not ambulate per . Ramp entry at home. Durable Medical Equipment at home:  Walker__Cane__RTS__ BSC__Shower Chair__  02_X Kayley 3l cont. HHN__ CPAP__  BiPap__  Hospital Bed__ W/C_X__ Other_____    Services in the home and/or outpatient, prior to admission:[facility]- SN/PT/OT/[facility]. [facility]  Deborrah Severance. Current Gina Santiago MD                                Medications:yes Prescription coverage? Yes Will pt require financial assistance with medications No     Transportation needs: per family      PT/OT recs:will be needed when appropriate    Hospital Exemption Notification (HEN):n/a at this time    Barriers to discharge:none    Plan/comments:plans to return home with spouse and [facility]. Is open to rehab if needed. PT/OT when appropriate to assist. Will follow.       ECOC on chart for MD signature

## 2022-11-30 ENCOUNTER — APPOINTMENT (OUTPATIENT)
Dept: GENERAL RADIOLOGY | Age: 67
DRG: 199 | End: 2022-11-30
Attending: INTERNAL MEDICINE
Payer: MEDICARE

## 2022-11-30 LAB
ANION GAP SERPL CALCULATED.3IONS-SCNC: 5 MMOL/L (ref 3–16)
BUN BLDV-MCNC: 7 MG/DL (ref 7–20)
CALCIUM SERPL-MCNC: 8 MG/DL (ref 8.3–10.6)
CHLORIDE BLD-SCNC: 99 MMOL/L (ref 99–110)
CO2: 35 MMOL/L (ref 21–32)
CREAT SERPL-MCNC: <0.5 MG/DL (ref 0.6–1.2)
GFR SERPL CREATININE-BSD FRML MDRD: >60 ML/MIN/{1.73_M2}
GLUCOSE BLD-MCNC: 105 MG/DL (ref 70–99)
GLUCOSE BLD-MCNC: 108 MG/DL (ref 70–99)
GLUCOSE BLD-MCNC: 114 MG/DL (ref 70–99)
GLUCOSE BLD-MCNC: 123 MG/DL (ref 70–99)
GLUCOSE BLD-MCNC: 152 MG/DL (ref 70–99)
HCT VFR BLD CALC: 30.5 % (ref 36–48)
HEMOGLOBIN: 9.5 G/DL (ref 12–16)
MCH RBC QN AUTO: 25.1 PG (ref 26–34)
MCHC RBC AUTO-ENTMCNC: 31.3 G/DL (ref 31–36)
MCV RBC AUTO: 80.3 FL (ref 80–100)
PDW BLD-RTO: 18.4 % (ref 12.4–15.4)
PERFORMED ON: ABNORMAL
PLATELET # BLD: 176 K/UL (ref 135–450)
PMV BLD AUTO: 7.8 FL (ref 5–10.5)
POTASSIUM REFLEX MAGNESIUM: 3.6 MMOL/L (ref 3.5–5.1)
RBC # BLD: 3.8 M/UL (ref 4–5.2)
SODIUM BLD-SCNC: 139 MMOL/L (ref 136–145)
WBC # BLD: 11 K/UL (ref 4–11)

## 2022-11-30 PROCEDURE — 6370000000 HC RX 637 (ALT 250 FOR IP): Performed by: INTERNAL MEDICINE

## 2022-11-30 PROCEDURE — 2060000000 HC ICU INTERMEDIATE R&B

## 2022-11-30 PROCEDURE — 94761 N-INVAS EAR/PLS OXIMETRY MLT: CPT

## 2022-11-30 PROCEDURE — 71045 X-RAY EXAM CHEST 1 VIEW: CPT

## 2022-11-30 PROCEDURE — 99231 SBSQ HOSP IP/OBS SF/LOW 25: CPT | Performed by: STUDENT IN AN ORGANIZED HEALTH CARE EDUCATION/TRAINING PROGRAM

## 2022-11-30 PROCEDURE — 6360000002 HC RX W HCPCS: Performed by: INTERNAL MEDICINE

## 2022-11-30 PROCEDURE — 94640 AIRWAY INHALATION TREATMENT: CPT

## 2022-11-30 PROCEDURE — 6370000000 HC RX 637 (ALT 250 FOR IP): Performed by: NURSE PRACTITIONER

## 2022-11-30 PROCEDURE — 80048 BASIC METABOLIC PNL TOTAL CA: CPT

## 2022-11-30 PROCEDURE — 85027 COMPLETE CBC AUTOMATED: CPT

## 2022-11-30 PROCEDURE — 2580000003 HC RX 258: Performed by: INTERNAL MEDICINE

## 2022-11-30 PROCEDURE — 2700000000 HC OXYGEN THERAPY PER DAY

## 2022-11-30 RX ORDER — NADOLOL 20 MG/1
20 TABLET ORAL DAILY
Status: DISCONTINUED | OUTPATIENT
Start: 2022-12-01 | End: 2022-12-09 | Stop reason: HOSPADM

## 2022-11-30 RX ORDER — PIRFENIDONE 267 MG/1
267 TABLET, FILM COATED ORAL 3 TIMES DAILY
Status: DISCONTINUED | OUTPATIENT
Start: 2022-11-30 | End: 2022-12-09 | Stop reason: HOSPADM

## 2022-11-30 RX ORDER — NADOLOL 20 MG/1
20 TABLET ORAL DAILY
COMMUNITY

## 2022-11-30 RX ADMIN — MORPHINE SULFATE 2 MG: 2 INJECTION, SOLUTION INTRAMUSCULAR; INTRAVENOUS at 11:38

## 2022-11-30 RX ADMIN — BENZONATATE 100 MG: 100 CAPSULE ORAL at 11:35

## 2022-11-30 RX ADMIN — SODIUM CHLORIDE, PRESERVATIVE FREE 10 ML: 5 INJECTION INTRAVENOUS at 20:43

## 2022-11-30 RX ADMIN — METOPROLOL SUCCINATE 25 MG: 25 TABLET, EXTENDED RELEASE ORAL at 20:47

## 2022-11-30 RX ADMIN — PIRFENIDONE 267 MG: 267 TABLET, FILM COATED ORAL at 14:08

## 2022-11-30 RX ADMIN — Medication 2 PUFF: at 08:44

## 2022-11-30 RX ADMIN — BENZONATATE 100 MG: 100 CAPSULE ORAL at 20:48

## 2022-11-30 RX ADMIN — MORPHINE SULFATE 2 MG: 2 INJECTION, SOLUTION INTRAMUSCULAR; INTRAVENOUS at 00:16

## 2022-11-30 RX ADMIN — MIRTAZAPINE 30 MG: 15 TABLET, FILM COATED ORAL at 20:49

## 2022-11-30 RX ADMIN — MORPHINE SULFATE 2 MG: 2 INJECTION, SOLUTION INTRAMUSCULAR; INTRAVENOUS at 20:42

## 2022-11-30 ASSESSMENT — PAIN SCALES - GENERAL
PAINLEVEL_OUTOF10: 4
PAINLEVEL_OUTOF10: 0
PAINLEVEL_OUTOF10: 8
PAINLEVEL_OUTOF10: 0
PAINLEVEL_OUTOF10: 4
PAINLEVEL_OUTOF10: 4

## 2022-11-30 ASSESSMENT — PAIN - FUNCTIONAL ASSESSMENT: PAIN_FUNCTIONAL_ASSESSMENT: PREVENTS OR INTERFERES SOME ACTIVE ACTIVITIES AND ADLS

## 2022-11-30 ASSESSMENT — PAIN DESCRIPTION - LOCATION
LOCATION: BACK
LOCATION: BACK

## 2022-11-30 ASSESSMENT — PAIN DESCRIPTION - DESCRIPTORS
DESCRIPTORS: ACHING;THROBBING;DISCOMFORT
DESCRIPTORS: ACHING;THROBBING;DISCOMFORT

## 2022-11-30 ASSESSMENT — PAIN SCALES - WONG BAKER
WONGBAKER_NUMERICALRESPONSE: 0
WONGBAKER_NUMERICALRESPONSE: 0

## 2022-11-30 ASSESSMENT — PAIN DESCRIPTION - ORIENTATION
ORIENTATION: RIGHT;OUTER
ORIENTATION: MID

## 2022-11-30 NOTE — PROGRESS NOTES
Hospitalist Progress Note    Date of Admission: 11/28/2022    Chief Complaint: SOB    Hospital Course:   69 y.o. female who presented to [facility] with SOB. PMHx significant for Chronic Hypoxic Respiratory Failure, Pulmonary Fibrosis, Cryptogenic Organizing Pneumonia. Initially presented to [address] and admitted to [facility] for Tension Pneumothorax. Chest tube was placed. Transferred to [facility] for surgical evaluation. Reports increasing SOB the afternoon of transfer. No chest pain. O2 requirement increased to 10L (baseline 3-4L). Subjective: Significant improvement after 2nd chest tube placed. Labs:   Recent Labs     11/27/22 0427 11/28/22  0444 11/29/22  0555   WBC 6.5 10.2 9.6   HGB 9.3* 9.2* 8.7*   HCT 29.6* 28.9* 27.3*    140 118*     Recent Labs     11/27/22 0426 11/28/22  0444 11/29/22  0555    139 140   K 4.0 3.1* 3.1*    98* 101   CO2 38* 37* 37*   BUN 11 9 9   CREATININE <0.5* <0.5* <0.5*   CALCIUM 7.9* 7.7* 7.5*     No results for input(s): AST, ALT, BILIDIR, BILITOT, ALKPHOS in the last 72 hours. Recent Labs     11/28/22 0444   INR 1.28*       Physical Exam Performed:    /72   Pulse 95   Temp 97.4 °F (36.3 °C) (Axillary)   Resp 26   Wt 109 lb 6.4 oz (49.6 kg)   SpO2 97%   BMI 19.38 kg/m²     General appearance:  No apparent distress, appears stated age and cooperative. HEENT:  Pupils equal, round, and reactive to light. Conjunctivae/corneas clear. Neck:  Supple, no jugular venous distention. Trachea midline with full range of motion. Respiratory:  Normal respiratory effort. Diminished on right > left. No wheezing. Cardiovascular:  Regular rate and rhythm with normal S1/S2 without murmurs, rubs or gallops. Abdomen:  Soft, non-tender, non-distended with normal bowel sounds. Musculoskelatal:  No clubbing, cyanosis or edema bilaterally. Full range of motion without deformity. Neurologic:  Neurovascularly intact without any focal sensory/motor deficits. Cranial nerves: II-XII intact, grossly non-focal.  Psychiatric:  Alert and oriented, thought content appropriate, normal insight  Skin:  Skin color, texture, turgor normal.  No rashes or lesions. Capillary Refill:  Brisk,< 3 seconds   Peripheral Pulses:  +2 palpable, equal bilaterally     Consults:    IP CONSULT TO CARDIOLOGY  IP CONSULT TO CARDIOTHORACIC SURGERY    Assessment/Plan:    Active Hospital Problems    Diagnosis     Pneumothorax [J93.9]      Priority: Medium    Tension pneumothorax [J93.0]      Priority: Medium     Acute on chronic respiratory failure with Hypoxia 2/2 right tension pneumothorax   -Underlying Pulmonary Fibrosis and Cryptogenic Organizing Pneumonia  -Persistent PTX with suspicion for displaced chest tube  -CT Surgery consulted and plan to place new CT guided chest tube  -Significant improvement with 2nd tube placement  -Plan for Pleurodesis on 11/30  -Wean O2 as tolerated     Chronic diastolic CHF  -Appears compensated  -Medical management. Paroxysmal Afibb with RVR - new diagnosis  -Back to NSR  -EP consulted  -Continue BB  -TSH stable  -Considering [facility] pending surgical plans. Diabetes Mellitus, Type 2: Controlled. Continue SSI. Monitor blood sugar and adjust regimen as needed. Diabetic (Carb-controlled) diet when able. Hypothyroidism  -TSH stable  -continue synthroid     Iron Deficiency Anemia  -Recent  Hemorrhoidal bleed - Colonoscopy negative  -Hb stable  -continue PO iron  -Monitor     Depression/Anxiety  -continue zoloft/remeron  -prn ativan     DVT Prophylaxis: Lovenox  Diet: ADULT DIET;  Regular  ADULT ORAL NUTRITION SUPPLEMENT; Breakfast, Lunch, Dinner; Standard High Calorie/High Protein Oral Supplement  Diet NPO Exceptions are: Sips of Water with Meds  Code Status: Full Code  PT/OT Eval Status: NA    Dispo - 2-3 days    Appropriate for A1 Discharge Unit: No    Suze Owens MD

## 2022-11-30 NOTE — CARE COORDINATION
Case Management Follow up      Chart reviewed and case discussed during huddle and rounds. Pt is admitted day # 2. Unit C-4. Diagnosis and currently status as per MD progress: tension pneumothorax- persistent. S/p snd CT placed per CTS. Planning pleurodesis 11/30. Services following:IM, CTS, EP    Anticipated Discharge date:pending progress    Expected Plan for Discharge: return home with spouse and resume [facility] services through [facility]. PT/OT when appropriate to assist.     Pre cert needed: will be needed for SNF placement    Potential Barriers:none    RN CM will continue to follow Pt clinical course for DCP needs.

## 2022-11-30 NOTE — PROGRESS NOTES
CVTS Thoracic Progress Note:          CC:  right sided pneumothorax (recurrent from 09/22)    Subj: resting in bed, breathing with ease, in nad. Obj:    Blood pressure 115/67, pulse 67, temperature 98.3 °F (36.8 °C), temperature source Oral, resp. rate 18, weight 110 lb (49.9 kg), SpO2 97 %, not currently breastfeeding. Lungs ctab   Abdomen soft, non-tender    Incision with occlusive dressing,  CDI   Chest tube with 48 ml serosanguinous drainage in last 24 hours/no airleak    Diagnostics:   Recent Labs     11/28/22  0444 11/29/22  0555 11/30/22  0600   WBC 10.2 9.6 11.0   HGB 9.2* 8.7* 9.5*   HCT 28.9* 27.3* 30.5*    118* 176                                                                  Recent Labs     11/28/22  0444 11/29/22  0555 11/30/22  0600    140 139   K 3.1* 3.1* 3.6   CL 98* 101 99   CO2 37* 37* 35*   BUN 9 9 7   CREATININE <0.5* <0.5* <0.5*   GLUCOSE 108* 94 108*            Recent Labs     11/28/22  0444 11/29/22  0555   MG 1.80 1.70*      Recent Labs     11/28/22  0444   INR 1.28*       CXR: 11/29/22   Impression   1. Significant improvement in the right-sided pneumothorax status post   placement of a pigtail pleural catheter. 2. Unchanged small bore chest tube within the right chest wall soft tissues. 3. Right upper extremity PICC tip projects over SVC. 4. Unchanged left-sided airspace opacities. Stable to mildly increased   right-sided airspace opacities. CXR: 11/30 pending     Assess/Plan:   Care per primary team and Pulmonology     Right ptx:   S/p 2nd chest tube placement by CT guidance 11/29  CXR this AM is pending.  If stable will place to water seal.  Continue to suction -20 cm H2O for now.   __________________________________________________________    PANDA Hernandez - CNP  11/30/2022  9:26 AM

## 2022-12-01 ENCOUNTER — APPOINTMENT (OUTPATIENT)
Dept: GENERAL RADIOLOGY | Age: 67
DRG: 199 | End: 2022-12-01
Attending: INTERNAL MEDICINE
Payer: MEDICARE

## 2022-12-01 LAB
ANION GAP SERPL CALCULATED.3IONS-SCNC: 3 MMOL/L (ref 3–16)
BUN BLDV-MCNC: 10 MG/DL (ref 7–20)
CALCIUM SERPL-MCNC: 8.2 MG/DL (ref 8.3–10.6)
CHLORIDE BLD-SCNC: 99 MMOL/L (ref 99–110)
CO2: 37 MMOL/L (ref 21–32)
CREAT SERPL-MCNC: <0.5 MG/DL (ref 0.6–1.2)
GFR SERPL CREATININE-BSD FRML MDRD: >60 ML/MIN/{1.73_M2}
GLUCOSE BLD-MCNC: 110 MG/DL (ref 70–99)
GLUCOSE BLD-MCNC: 120 MG/DL (ref 70–99)
GLUCOSE BLD-MCNC: 123 MG/DL (ref 70–99)
GLUCOSE BLD-MCNC: 124 MG/DL (ref 70–99)
GLUCOSE BLD-MCNC: 163 MG/DL (ref 70–99)
HCT VFR BLD CALC: 30.2 % (ref 36–48)
HEMOGLOBIN: 9.2 G/DL (ref 12–16)
MCH RBC QN AUTO: 24.3 PG (ref 26–34)
MCHC RBC AUTO-ENTMCNC: 30.4 G/DL (ref 31–36)
MCV RBC AUTO: 79.8 FL (ref 80–100)
PDW BLD-RTO: 18 % (ref 12.4–15.4)
PERFORMED ON: ABNORMAL
PLATELET # BLD: 192 K/UL (ref 135–450)
PMV BLD AUTO: 7.9 FL (ref 5–10.5)
POTASSIUM REFLEX MAGNESIUM: 4 MMOL/L (ref 3.5–5.1)
RBC # BLD: 3.78 M/UL (ref 4–5.2)
SODIUM BLD-SCNC: 139 MMOL/L (ref 136–145)
WBC # BLD: 11.3 K/UL (ref 4–11)

## 2022-12-01 PROCEDURE — 2700000000 HC OXYGEN THERAPY PER DAY

## 2022-12-01 PROCEDURE — 99231 SBSQ HOSP IP/OBS SF/LOW 25: CPT | Performed by: STUDENT IN AN ORGANIZED HEALTH CARE EDUCATION/TRAINING PROGRAM

## 2022-12-01 PROCEDURE — 71045 X-RAY EXAM CHEST 1 VIEW: CPT

## 2022-12-01 PROCEDURE — 94761 N-INVAS EAR/PLS OXIMETRY MLT: CPT

## 2022-12-01 PROCEDURE — 80048 BASIC METABOLIC PNL TOTAL CA: CPT

## 2022-12-01 PROCEDURE — 2060000000 HC ICU INTERMEDIATE R&B

## 2022-12-01 PROCEDURE — 6370000000 HC RX 637 (ALT 250 FOR IP): Performed by: NURSE PRACTITIONER

## 2022-12-01 PROCEDURE — 6370000000 HC RX 637 (ALT 250 FOR IP): Performed by: INTERNAL MEDICINE

## 2022-12-01 PROCEDURE — 85027 COMPLETE CBC AUTOMATED: CPT

## 2022-12-01 PROCEDURE — 2580000003 HC RX 258: Performed by: INTERNAL MEDICINE

## 2022-12-01 PROCEDURE — 6360000002 HC RX W HCPCS: Performed by: INTERNAL MEDICINE

## 2022-12-01 RX ADMIN — BENZONATATE 100 MG: 100 CAPSULE ORAL at 07:44

## 2022-12-01 RX ADMIN — LORAZEPAM 0.5 MG: 0.5 TABLET ORAL at 02:45

## 2022-12-01 RX ADMIN — ATORVASTATIN CALCIUM 40 MG: 40 TABLET, FILM COATED ORAL at 02:44

## 2022-12-01 RX ADMIN — SERTRALINE HYDROCHLORIDE 200 MG: 50 TABLET ORAL at 07:43

## 2022-12-01 RX ADMIN — NADOLOL 20 MG: 20 TABLET ORAL at 07:43

## 2022-12-01 RX ADMIN — Medication 10 ML: at 02:20

## 2022-12-01 RX ADMIN — LEVOTHYROXINE SODIUM 125 MCG: 0.12 TABLET ORAL at 07:56

## 2022-12-01 RX ADMIN — TRAMADOL HYDROCHLORIDE 50 MG: 50 TABLET, COATED ORAL at 21:35

## 2022-12-01 RX ADMIN — PANTOPRAZOLE SODIUM 40 MG: 40 TABLET, DELAYED RELEASE ORAL at 07:43

## 2022-12-01 RX ADMIN — MORPHINE SULFATE 2 MG: 2 INJECTION, SOLUTION INTRAMUSCULAR; INTRAVENOUS at 02:21

## 2022-12-01 RX ADMIN — PIRFENIDONE 267 MG: 267 TABLET, FILM COATED ORAL at 07:46

## 2022-12-01 RX ADMIN — SODIUM CHLORIDE, PRESERVATIVE FREE 10 ML: 5 INJECTION INTRAVENOUS at 19:59

## 2022-12-01 RX ADMIN — ACETAMINOPHEN 650 MG: 325 TABLET, FILM COATED ORAL at 23:51

## 2022-12-01 RX ADMIN — ATORVASTATIN CALCIUM 40 MG: 40 TABLET, FILM COATED ORAL at 19:57

## 2022-12-01 RX ADMIN — MIRTAZAPINE 30 MG: 15 TABLET, FILM COATED ORAL at 19:57

## 2022-12-01 ASSESSMENT — PAIN SCALES - GENERAL
PAINLEVEL_OUTOF10: 0
PAINLEVEL_OUTOF10: 7
PAINLEVEL_OUTOF10: 0
PAINLEVEL_OUTOF10: 6
PAINLEVEL_OUTOF10: 9
PAINLEVEL_OUTOF10: 0
PAINLEVEL_OUTOF10: 9
PAINLEVEL_OUTOF10: 0

## 2022-12-01 ASSESSMENT — PAIN SCALES - WONG BAKER: WONGBAKER_NUMERICALRESPONSE: 0

## 2022-12-01 ASSESSMENT — PAIN DESCRIPTION - LOCATION
LOCATION: BACK
LOCATION: BACK

## 2022-12-01 NOTE — PROGRESS NOTES
Hospitalist Progress Note    Date of Admission: 11/28/2022    Chief Complaint: SOB    Hospital Course:   69 y.o. female who presented to [facility] with SOB. PMHx significant for Chronic Hypoxic Respiratory Failure, Pulmonary Fibrosis, Cryptogenic Organizing Pneumonia. Initially presented to [facility] and admitted to [facility] for Tension Pneumothorax. Chest tube was placed. Transferred to [facility] for surgical evaluation. Reports increasing SOB the afternoon of transfer. No chest pain. O2 requirement increased to 10L (baseline 3-4L). Subjective: CXR showed recurrent PTX after chest tube clamping trial. Pneumostat placed. Worsening O2 requirement, up to 7L again. Labs:   Recent Labs     11/29/22  0555 11/30/22  0600 12/01/22  0730   WBC 9.6 11.0 11.3*   HGB 8.7* 9.5* 9.2*   HCT 27.3* 30.5* 30.2*   * 176 192       Recent Labs     11/29/22  0555 11/30/22  0600 12/01/22  0730    139 139   K 3.1* 3.6 4.0    99 99   CO2 37* 35* 37*   BUN 9 7 10   CREATININE <0.5* <0.5* <0.5*   CALCIUM 7.5* 8.0* 8.2*       No results for input(s): AST, ALT, BILIDIR, BILITOT, ALKPHOS in the last 72 hours. No results for input(s): INR in the last 72 hours. Physical Exam Performed:    /76   Pulse 83   Temp 98.1 °F (36.7 °C) (Oral)   Resp 26   Wt 105 lb (47.6 kg)   SpO2 97%   BMI 18.60 kg/m²     General appearance:  No apparent distress, appears stated age and cooperative. HEENT:  Pupils equal, round, and reactive to light. Conjunctivae/corneas clear. Neck:  Supple, no jugular venous distention. Trachea midline with full range of motion. Respiratory:  Normal respiratory effort. Diminished on right > left. No wheezing. Cardiovascular:  Regular rate and rhythm with normal S1/S2 without murmurs, rubs or gallops. Abdomen:  Soft, non-tender, non-distended with normal bowel sounds. Musculoskelatal:  No clubbing, cyanosis or edema bilaterally. Full range of motion without deformity.   Neurologic: Depression/Anxiety  -continue zoloft/remeron  -prn ativan     DVT Prophylaxis: Lovenox  Diet: ADULT DIET; Regular  ADULT ORAL NUTRITION SUPPLEMENT; Breakfast, Lunch, Dinner; Standard High Calorie/High Protein Oral Supplement  Code Status: Full Code  PT/OT Eval Status: NA    Dispo - pending improved O2 requirement.  Will likely go with chest tube/pneumostat    Appropriate for A1 Discharge Unit:     Kimberlee Rubio MD

## 2022-12-01 NOTE — PROGRESS NOTES
Hospitalist Progress Note    Date of Admission: 11/28/2022    Chief Complaint: SOB    Hospital Course:   69 y.o. female who presented to [facility] with SOB. PMHx significant for Chronic Hypoxic Respiratory Failure, Pulmonary Fibrosis, Cryptogenic Organizing Pneumonia. Initially presented to [address] and admitted to [facility] for Tension Pneumothorax. Chest tube was placed. Transferred to [facility] for surgical evaluation. Reports increasing SOB the afternoon of transfer. No chest pain. O2 requirement increased to 10L (baseline 3-4L). Subjective: CXR showed resolution of PTX. No significant chest pain or SOB. Labs:   Recent Labs     11/28/22  0444 11/29/22  0555 11/30/22  0600   WBC 10.2 9.6 11.0   HGB 9.2* 8.7* 9.5*   HCT 28.9* 27.3* 30.5*    118* 176       Recent Labs     11/28/22 0444 11/29/22  0555 11/30/22  0600    140 139   K 3.1* 3.1* 3.6   CL 98* 101 99   CO2 37* 37* 35*   BUN 9 9 7   CREATININE <0.5* <0.5* <0.5*   CALCIUM 7.7* 7.5* 8.0*       No results for input(s): AST, ALT, BILIDIR, BILITOT, ALKPHOS in the last 72 hours. Recent Labs     11/28/22 0444   INR 1.28*         Physical Exam Performed:    /63   Pulse (!) 125   Temp 97.5 °F (36.4 °C) (Oral)   Resp 24   Wt 110 lb (49.9 kg)   SpO2 96%   BMI 19.49 kg/m²     General appearance:  No apparent distress, appears stated age and cooperative. HEENT:  Pupils equal, round, and reactive to light. Conjunctivae/corneas clear. Neck:  Supple, no jugular venous distention. Trachea midline with full range of motion. Respiratory:  Normal respiratory effort. Diminished on right > left. No wheezing. Cardiovascular:  Regular rate and rhythm with normal S1/S2 without murmurs, rubs or gallops. Abdomen:  Soft, non-tender, non-distended with normal bowel sounds. Musculoskelatal:  No clubbing, cyanosis or edema bilaterally. Full range of motion without deformity.   Neurologic:  Neurovascularly intact without any focal sensory/motor deficits. Cranial nerves: II-XII intact, grossly non-focal.  Psychiatric:  Alert and oriented, thought content appropriate, normal insight  Skin:  Skin color, texture, turgor normal.  No rashes or lesions. Capillary Refill:  Brisk,< 3 seconds   Peripheral Pulses:  +2 palpable, equal bilaterally     Consults:    IP CONSULT TO CARDIOLOGY  IP CONSULT TO CARDIOTHORACIC SURGERY    Assessment/Plan:    Active Hospital Problems    Diagnosis     Pneumothorax [J93.9]      Priority: Medium    Tension pneumothorax [J93.0]      Priority: Medium     Acute on chronic respiratory failure with Hypoxia 2/2 right tension pneumothorax   -Underlying Pulmonary Fibrosis and Cryptogenic Organizing Pneumonia  -Persistent PTX with suspicion for displaced chest tube  -CT Surgery consulted and new chest tube was placed  -Significant improvement with 2nd tube placement  -Pleurodesis now being deferred given resolution of PTX and potential for Lung Transplant in the future   -Wean O2 as tolerated     Chronic diastolic CHF  -Appears compensated  -Medical management. Paroxysmal Afibb with RVR - new diagnosis  -Back to NSR  -EP consulted  -TSH stable  -Considering AC pending surgical plans.    -Continue BB; patient/family expressed concerns about using Metoprolol in the past as it has caused chest pain. Discussed with EP and it would be ok to resume her Nadolol at 20 mg daily if she prefers.    -Plan for Cardiac Monitor at [place]. Diabetes Mellitus, Type 2: Controlled. Continue SSI. Monitor blood sugar and adjust regimen as needed. Diabetic (Carb-controlled) diet when able. Hypothyroidism  -TSH stable  -continue synthroid     Iron Deficiency Anemia  -Recent  Hemorrhoidal bleed - Colonoscopy negative  -Hb stable  -continue PO iron  -Monitor     Depression/Anxiety  -continue zoloft/remeron  -prn ativan     DVT Prophylaxis: Lovenox  Diet: ADULT DIET;  Regular  ADULT ORAL NUTRITION SUPPLEMENT; Breakfast, Lunch, Dinner; Standard High Calorie/High Protein Oral Supplement  Code Status: Full Code  PT/OT Eval Status: NA    Dispo - pending removal of chest tubes.      Appropriate for A1 Discharge Unit: Hortencia Kan MD

## 2022-12-01 NOTE — CARE COORDINATION
Dr Lennox Maxcy updated Per Jamarcus, discharge pending CTSurg plan for chest tube which is clamped, CXR resulted, primary RN has not heard from [address] since this morning. Discharge plan is for home with resumption of [facility] and [facility] nonskilled services, PP CM is Mita ph [phone] CM will continue to follow pt's progress and coordinate discharge arrangements. DIAMOND Godfrey-RN     7625 Addendum:  CTSurg plan for pneumostat with f/u Tuesday, per primary RN, pt will not need to manage at home. Writer spoke with pt, she thinks family will be able to transport her home, comfortable with going home today, has no new needs. Writer spoke with Luis/[facility], they can resume services, but will not manage chest tube which is fine since CTSurg will in office. Writer spoke with pt's daughter [place] allis, she works until [employer] in [facility], she said to call pt's . Writer spoke with pt's  Rony, he leaves for work in an hour and will not be able to get pt, also states pt's baseline O2 is 4L at rest and 8L while ambulating, he thinks daughter will be able to pick pt up but does not have O2 tank. Writer spoke with primary RN, pt currently on 7L, writer concerned about pt going home on this liter flow. Writer spoke with Dr Lennox Maxcy, he will keep pt overnight to try and wean. Writer updated daughter and ,  will plan to come to bedside with portable tank tomorrow btwn 10-11am to drive pt home, he needs to be home early afternoon for work.   JENNIFER GodfreyRN

## 2022-12-01 NOTE — PROGRESS NOTES
CVTS Thoracic Progress Note:          CC:  right sided pneumothorax (recurrent from 09/22)    Subj: resting in bed, breathing with ease, in nad. Obj:    Blood pressure 135/72, pulse (!) 107, temperature 98.1 °F (36.7 °C), temperature source Oral, resp. rate 26, weight 105 lb (47.6 kg), SpO2 92 %, not currently breastfeeding. Lungs ctab   Abdomen soft, non-tender    Incision with occlusive dressing,  CDI   Chest tube with 50 ml serosanguinous drainage in last 24 hours/no airleak    Diagnostics:   Recent Labs     11/29/22  0555 11/30/22  0600 12/01/22  0730   WBC 9.6 11.0 11.3*   HGB 8.7* 9.5* 9.2*   HCT 27.3* 30.5* 30.2*   * 176 192                                                                  Recent Labs     11/29/22  0555 11/30/22  0600 12/01/22  0730    139 139   K 3.1* 3.6 4.0    99 99   CO2 37* 35* 37*   BUN 9 7 10   CREATININE <0.5* <0.5* <0.5*   GLUCOSE 94 108* 110*            Recent Labs     11/29/22  0555   MG 1.70*        CXR: 11/29/22   Impression   1. Significant improvement in the right-sided pneumothorax status post   placement of a pigtail pleural catheter. 2. Unchanged small bore chest tube within the right chest wall soft tissues. 3. Right upper extremity PICC tip projects over SVC. 4. Unchanged left-sided airspace opacities. Stable to mildly increased   right-sided airspace opacities. CXR: 11/30      Impression   1. No evidence of pneumothorax. 2.  Stable diffuse interstitial changes        Assess/Plan:   Care per primary team and Pulmonology     Right ptx:   Removed original chest tube 11/30  Remaining chest tube clamped this morning.   Will get chest x-ray at 11 am.   Pending that result she might get her tube out or go home with a pneumostat.   __________________________________________________________    PANDA Bass - CNP  12/1/2022  9:13 AM

## 2022-12-02 ENCOUNTER — APPOINTMENT (OUTPATIENT)
Dept: GENERAL RADIOLOGY | Age: 67
DRG: 199 | End: 2022-12-02
Attending: INTERNAL MEDICINE
Payer: MEDICARE

## 2022-12-02 LAB
ANION GAP SERPL CALCULATED.3IONS-SCNC: 2 MMOL/L (ref 3–16)
BUN BLDV-MCNC: 13 MG/DL (ref 7–20)
CALCIUM SERPL-MCNC: 8.3 MG/DL (ref 8.3–10.6)
CHLORIDE BLD-SCNC: 99 MMOL/L (ref 99–110)
CO2: 37 MMOL/L (ref 21–32)
CREAT SERPL-MCNC: <0.5 MG/DL (ref 0.6–1.2)
GFR SERPL CREATININE-BSD FRML MDRD: >60 ML/MIN/{1.73_M2}
GLUCOSE BLD-MCNC: 118 MG/DL (ref 70–99)
GLUCOSE BLD-MCNC: 118 MG/DL (ref 70–99)
GLUCOSE BLD-MCNC: 129 MG/DL (ref 70–99)
GLUCOSE BLD-MCNC: 132 MG/DL (ref 70–99)
GLUCOSE BLD-MCNC: 148 MG/DL (ref 70–99)
HCT VFR BLD CALC: 30.9 % (ref 36–48)
HEMOGLOBIN: 9.7 G/DL (ref 12–16)
MCH RBC QN AUTO: 25 PG (ref 26–34)
MCHC RBC AUTO-ENTMCNC: 31.6 G/DL (ref 31–36)
MCV RBC AUTO: 79.2 FL (ref 80–100)
PDW BLD-RTO: 18.5 % (ref 12.4–15.4)
PERFORMED ON: ABNORMAL
PLATELET # BLD: 215 K/UL (ref 135–450)
PMV BLD AUTO: 7.8 FL (ref 5–10.5)
POTASSIUM REFLEX MAGNESIUM: 4.1 MMOL/L (ref 3.5–5.1)
RBC # BLD: 3.9 M/UL (ref 4–5.2)
SODIUM BLD-SCNC: 138 MMOL/L (ref 136–145)
WBC # BLD: 9.8 K/UL (ref 4–11)

## 2022-12-02 PROCEDURE — 71045 X-RAY EXAM CHEST 1 VIEW: CPT

## 2022-12-02 PROCEDURE — 6370000000 HC RX 637 (ALT 250 FOR IP): Performed by: NURSE PRACTITIONER

## 2022-12-02 PROCEDURE — 2580000003 HC RX 258: Performed by: INTERNAL MEDICINE

## 2022-12-02 PROCEDURE — 6360000002 HC RX W HCPCS: Performed by: INTERNAL MEDICINE

## 2022-12-02 PROCEDURE — 2700000000 HC OXYGEN THERAPY PER DAY

## 2022-12-02 PROCEDURE — 99231 SBSQ HOSP IP/OBS SF/LOW 25: CPT | Performed by: NURSE PRACTITIONER

## 2022-12-02 PROCEDURE — 94761 N-INVAS EAR/PLS OXIMETRY MLT: CPT

## 2022-12-02 PROCEDURE — 85027 COMPLETE CBC AUTOMATED: CPT

## 2022-12-02 PROCEDURE — 2060000000 HC ICU INTERMEDIATE R&B

## 2022-12-02 PROCEDURE — 6370000000 HC RX 637 (ALT 250 FOR IP): Performed by: INTERNAL MEDICINE

## 2022-12-02 PROCEDURE — 80048 BASIC METABOLIC PNL TOTAL CA: CPT

## 2022-12-02 RX ADMIN — BENZONATATE 100 MG: 100 CAPSULE ORAL at 00:49

## 2022-12-02 RX ADMIN — LEVOTHYROXINE SODIUM 125 MCG: 0.12 TABLET ORAL at 07:44

## 2022-12-02 RX ADMIN — MIRTAZAPINE 30 MG: 15 TABLET, FILM COATED ORAL at 20:06

## 2022-12-02 RX ADMIN — SODIUM CHLORIDE, PRESERVATIVE FREE 10 ML: 5 INJECTION INTRAVENOUS at 20:06

## 2022-12-02 RX ADMIN — BENZONATATE 100 MG: 100 CAPSULE ORAL at 09:27

## 2022-12-02 RX ADMIN — MORPHINE SULFATE 2 MG: 2 INJECTION, SOLUTION INTRAMUSCULAR; INTRAVENOUS at 19:34

## 2022-12-02 RX ADMIN — PANTOPRAZOLE SODIUM 40 MG: 40 TABLET, DELAYED RELEASE ORAL at 09:23

## 2022-12-02 RX ADMIN — SERTRALINE HYDROCHLORIDE 200 MG: 50 TABLET ORAL at 09:23

## 2022-12-02 RX ADMIN — GUAIFENESIN AND DEXTROMETHORPHAN 5 ML: 100; 10 SYRUP ORAL at 19:33

## 2022-12-02 RX ADMIN — NADOLOL 20 MG: 20 TABLET ORAL at 09:23

## 2022-12-02 RX ADMIN — BENZONATATE 100 MG: 100 CAPSULE ORAL at 15:42

## 2022-12-02 RX ADMIN — SODIUM CHLORIDE, PRESERVATIVE FREE 10 ML: 5 INJECTION INTRAVENOUS at 09:24

## 2022-12-02 RX ADMIN — TRAMADOL HYDROCHLORIDE 50 MG: 50 TABLET, COATED ORAL at 18:25

## 2022-12-02 RX ADMIN — ATORVASTATIN CALCIUM 40 MG: 40 TABLET, FILM COATED ORAL at 20:06

## 2022-12-02 ASSESSMENT — PAIN DESCRIPTION - ORIENTATION
ORIENTATION: MID
ORIENTATION: MID

## 2022-12-02 ASSESSMENT — PAIN DESCRIPTION - LOCATION
LOCATION: BACK
LOCATION: BACK

## 2022-12-02 ASSESSMENT — PAIN SCALES - GENERAL
PAINLEVEL_OUTOF10: 8
PAINLEVEL_OUTOF10: 9

## 2022-12-02 ASSESSMENT — PAIN DESCRIPTION - DESCRIPTORS
DESCRIPTORS: THROBBING;SPASM
DESCRIPTORS: THROBBING;SPASM

## 2022-12-02 NOTE — PROGRESS NOTES
Writer returned pt's daughter Christian Warren call, given update on pt and plan of care.   Dominique Chaney RN  12/2/2022

## 2022-12-02 NOTE — PROGRESS NOTES
Hospitalist Progress Note    Date of Admission: 11/28/2022    Chief Complaint: SOB    Hospital Course:   69 y.o. female who presented to [facility] with SOB. PMHx significant for Chronic Hypoxic Respiratory Failure, Pulmonary Fibrosis, Cryptogenic Organizing Pneumonia. Initially presented to [facility] and admitted to [facility] for Tension Pneumothorax. Chest tube was placed. Transferred to [facility] for surgical evaluation. Reports increasing SOB the afternoon of transfer. No chest pain. O2 requirement increased to 10L (baseline 3-4L). Subjective: CXR shows persistent PTX. O2 requirement up to 8L. Labs:   Recent Labs     11/30/22  0600 12/01/22  0730 12/02/22  0754   WBC 11.0 11.3* 9.8   HGB 9.5* 9.2* 9.7*   HCT 30.5* 30.2* 30.9*    192 215       Recent Labs     11/30/22  0600 12/01/22  0730 12/02/22  0754    139 138   K 3.6 4.0 4.1   CL 99 99 99   CO2 35* 37* 37*   BUN 7 10 13   CREATININE <0.5* <0.5* <0.5*   CALCIUM 8.0* 8.2* 8.3       No results for input(s): AST, ALT, BILIDIR, BILITOT, ALKPHOS in the last 72 hours. No results for input(s): INR in the last 72 hours. Physical Exam Performed:    /80   Pulse (!) 116   Temp 97.3 °F (36.3 °C) (Oral)   Resp 18   Wt 105 lb (47.6 kg)   SpO2 94%   BMI 18.60 kg/m²     General appearance:  No apparent distress, appears stated age and cooperative. HEENT:  Pupils equal, round, and reactive to light. Conjunctivae/corneas clear. Neck:  Supple, no jugular venous distention. Trachea midline with full range of motion. Respiratory:  Normal respiratory effort. Diminished on right > left. No wheezing. Cardiovascular:  Regular rate and rhythm with normal S1/S2 without murmurs, rubs or gallops. Abdomen:  Soft, non-tender, non-distended with normal bowel sounds. Musculoskelatal:  No clubbing, cyanosis or edema bilaterally. Full range of motion without deformity. Neurologic:  Neurovascularly intact without any focal sensory/motor deficits. Cranial nerves: II-XII intact, grossly non-focal.  Psychiatric:  Alert and oriented, thought content appropriate, normal insight  Skin:  Skin color, texture, turgor normal.  No rashes or lesions. Capillary Refill:  Brisk,< 3 seconds   Peripheral Pulses:  +2 palpable, equal bilaterally     Consults:    IP CONSULT TO CARDIOLOGY  IP CONSULT TO CARDIOTHORACIC SURGERY    Assessment/Plan:    Active Hospital Problems    Diagnosis     Pneumothorax [J93.9]      Priority: Medium    Tension pneumothorax [J93.0]      Priority: Medium     Acute on chronic respiratory failure with Hypoxia 2/2 right tension pneumothorax   -Underlying Pulmonary Fibrosis and Cryptogenic Organizing Pneumonia  -Persistent PTX with suspicion for displaced chest tube  -CT Surgery consulted and new chest tube was placed  -Significant improvement with 2nd tube placement  -Pleurodesis now being deferred given resolution of PTX and potential for Lung Transplant in the future   -CXR showed recurrent PTX after chest tube clamping trial.   -Continue chest tube to suction.   -Worsening O2 requirement, up to 8L. -Wean O2 as tolerated  -Plan for Pleurodesis this weekend. Chronic diastolic CHF  -Appears compensated  -Medical management. Paroxysmal Afib with RVR - new diagnosis  -Back to NSR  -EP consulted  -TSH stable  -Considering AC pending surgical plans.    -Continue BB; patient/family expressed concerns about using Metoprolol in the past as it has caused chest pain. Discussed with EP and it would be ok to resume her Nadolol at 20 mg daily if she prefers.    -Plan for Cardiac Monitor at [place]. Diabetes Mellitus, Type 2: Controlled. Continue SSI. Monitor blood sugar and adjust regimen as needed. Diabetic (Carb-controlled) diet when able.       Hypothyroidism  -TSH stable  -continue synthroid     Iron Deficiency Anemia  -Recent  Hemorrhoidal bleed - Colonoscopy negative  -Hb stable  -continue PO iron  -Monitor     Depression/Anxiety  -continue zoloft/remeron  -prn ativan     DVT Prophylaxis: Lovenox  Diet: ADULT DIET; Regular  ADULT ORAL NUTRITION SUPPLEMENT; Breakfast, Lunch, Dinner; Standard High Calorie/High Protein Oral Supplement  Code Status: Full Code  PT/OT Eval Status: NA    Dispo - pending surgery.     Appropriate for A1 Discharge Unit: Hortencia Manrique MD

## 2022-12-02 NOTE — PROGRESS NOTES
CVTS Thoracic Progress Note:          CC:  right sided pneumothorax (recurrent from 09/22)    Subj: resting in bed, breathing with ease, in nad. Obj:    Blood pressure 134/80, pulse (!) 116, temperature 97.3 °F (36.3 °C), temperature source Oral, resp. rate 18, weight 105 lb (47.6 kg), SpO2 94 %, not currently breastfeeding. Lungs ctab   Abdomen soft, non-tender    Incision with occlusive dressing,  CDI   Chest tube with 0 ml drainage in last 24 hours/+ expiratory airleak    Diagnostics:   Recent Labs     11/30/22  0600 12/01/22  0730 12/02/22  0754   WBC 11.0 11.3* 9.8   HGB 9.5* 9.2* 9.7*   HCT 30.5* 30.2* 30.9*    192 215                                                                  Recent Labs     11/30/22  0600 12/01/22  0730 12/02/22  0754    139 138   K 3.6 4.0 4.1   CL 99 99 99   CO2 35* 37* 37*   BUN 7 10 13   CREATININE <0.5* <0.5* <0.5*   GLUCOSE 108* 110* 118*        CXR: 11/29/22   Impression   1. Significant improvement in the right-sided pneumothorax status post   placement of a pigtail pleural catheter. 2. Unchanged small bore chest tube within the right chest wall soft tissues. 3. Right upper extremity PICC tip projects over SVC. 4. Unchanged left-sided airspace opacities. Stable to mildly increased   right-sided airspace opacities. CXR: 11/30      Impression   1. No evidence of pneumothorax. 2.  Stable diffuse interstitial changes      CXR: 12/2/22 enlarged right pneumothorax      Assess/Plan:   Care per primary team and Pulmonology     Recurrent right ptx:   Removed original chest tube 11/30  Changed [facility] over to pneumostat 12/1. CXR this AM with enlarged ptx. Placed a dry seal and connected it to suction.    Will discuss this with team to see if she still may need a chemical pleurodesis prior to going anywhere.   __________________________________________________    PANDA Noe - CNP  12/2/2022  10:43 AM

## 2022-12-02 NOTE — CARE COORDINATION
12/2/22 Plan will be home with family and [facility]. Pt receives RN, PT/OT. Pt has a HHA with [facility] M-F, O2 with [facility], and meals 7 days a week. Passport services. Per MD CXR this AM with enlarged ptx. Placed a dry seal and connected it to suction. Will discuss this with team to see if she still may need a chemical pleurodesis prior to going anywhere. Pt's passport CM will resume all non-skilled services on dc her name is Shantell Arnold [phone] ext [phone].

## 2022-12-02 NOTE — PROGRESS NOTES
/80   Pulse (!) 116   Temp 97.3 °F (36.3 °C) (Oral)   Resp 18   Wt 105 lb (47.6 kg)   SpO2 94%   BMI 18.60 kg/m²  on 6L high flow O2. O2 had to be increased to 8L when returning to bed from commode, sats rebounded and O2 currently on 6L. Pt denies pain. Dyspnea with exertion. Lungs diminished. Pt with occasional productive cough, prn tessalon valeria given per pt request.  Right pneumostat in place, dressing clean, dry and intact, reinforced, no drainage noted. ST on tele. Pt denies any other needs at this time. Bedside table, call light, fluids within reach. Pt instructed to call out for any needs and assistance. Bed alarm activated on bed.   Pat Floyd RN  12/2/2022

## 2022-12-03 ENCOUNTER — APPOINTMENT (OUTPATIENT)
Dept: GENERAL RADIOLOGY | Age: 67
DRG: 199 | End: 2022-12-03
Attending: INTERNAL MEDICINE
Payer: MEDICARE

## 2022-12-03 LAB
ANION GAP SERPL CALCULATED.3IONS-SCNC: 3 MMOL/L (ref 3–16)
BUN BLDV-MCNC: 11 MG/DL (ref 7–20)
CALCIUM SERPL-MCNC: 8.6 MG/DL (ref 8.3–10.6)
CHLORIDE BLD-SCNC: 99 MMOL/L (ref 99–110)
CO2: 36 MMOL/L (ref 21–32)
CREAT SERPL-MCNC: <0.5 MG/DL (ref 0.6–1.2)
EKG ATRIAL RATE: 326 BPM
EKG DIAGNOSIS: NORMAL
EKG Q-T INTERVAL: 328 MS
EKG QRS DURATION: 66 MS
EKG QTC CALCULATION (BAZETT): 469 MS
EKG R AXIS: 11 DEGREES
EKG T AXIS: -26 DEGREES
EKG VENTRICULAR RATE: 123 BPM
GFR SERPL CREATININE-BSD FRML MDRD: >60 ML/MIN/{1.73_M2}
GLUCOSE BLD-MCNC: 113 MG/DL (ref 70–99)
GLUCOSE BLD-MCNC: 134 MG/DL (ref 70–99)
GLUCOSE BLD-MCNC: 138 MG/DL (ref 70–99)
GLUCOSE BLD-MCNC: 141 MG/DL (ref 70–99)
GLUCOSE BLD-MCNC: 143 MG/DL (ref 70–99)
HCT VFR BLD CALC: 32.5 % (ref 36–48)
HEMOGLOBIN: 10.2 G/DL (ref 12–16)
MCH RBC QN AUTO: 24.8 PG (ref 26–34)
MCHC RBC AUTO-ENTMCNC: 31.3 G/DL (ref 31–36)
MCV RBC AUTO: 79.2 FL (ref 80–100)
PDW BLD-RTO: 18.4 % (ref 12.4–15.4)
PERFORMED ON: ABNORMAL
PLATELET # BLD: 285 K/UL (ref 135–450)
PMV BLD AUTO: 7.6 FL (ref 5–10.5)
POTASSIUM REFLEX MAGNESIUM: 4.3 MMOL/L (ref 3.5–5.1)
RBC # BLD: 4.1 M/UL (ref 4–5.2)
SODIUM BLD-SCNC: 138 MMOL/L (ref 136–145)
WBC # BLD: 12.8 K/UL (ref 4–11)

## 2022-12-03 PROCEDURE — 2580000003 HC RX 258: Performed by: INTERNAL MEDICINE

## 2022-12-03 PROCEDURE — 71045 X-RAY EXAM CHEST 1 VIEW: CPT

## 2022-12-03 PROCEDURE — 99231 SBSQ HOSP IP/OBS SF/LOW 25: CPT | Performed by: STUDENT IN AN ORGANIZED HEALTH CARE EDUCATION/TRAINING PROGRAM

## 2022-12-03 PROCEDURE — 6360000002 HC RX W HCPCS: Performed by: INTERNAL MEDICINE

## 2022-12-03 PROCEDURE — 94761 N-INVAS EAR/PLS OXIMETRY MLT: CPT

## 2022-12-03 PROCEDURE — 2060000000 HC ICU INTERMEDIATE R&B

## 2022-12-03 PROCEDURE — 85027 COMPLETE CBC AUTOMATED: CPT

## 2022-12-03 PROCEDURE — 93005 ELECTROCARDIOGRAM TRACING: CPT | Performed by: INTERNAL MEDICINE

## 2022-12-03 PROCEDURE — 6370000000 HC RX 637 (ALT 250 FOR IP): Performed by: INTERNAL MEDICINE

## 2022-12-03 PROCEDURE — 6370000000 HC RX 637 (ALT 250 FOR IP): Performed by: NURSE PRACTITIONER

## 2022-12-03 PROCEDURE — 2700000000 HC OXYGEN THERAPY PER DAY

## 2022-12-03 PROCEDURE — 93010 ELECTROCARDIOGRAM REPORT: CPT | Performed by: INTERNAL MEDICINE

## 2022-12-03 PROCEDURE — 80048 BASIC METABOLIC PNL TOTAL CA: CPT

## 2022-12-03 RX ORDER — DILTIAZEM HYDROCHLORIDE 240 MG/1
240 CAPSULE, COATED, EXTENDED RELEASE ORAL DAILY
Status: DISCONTINUED | OUTPATIENT
Start: 2022-12-03 | End: 2022-12-09 | Stop reason: HOSPADM

## 2022-12-03 RX ADMIN — BENZONATATE 100 MG: 100 CAPSULE ORAL at 18:22

## 2022-12-03 RX ADMIN — LEVOTHYROXINE SODIUM 125 MCG: 0.12 TABLET ORAL at 05:41

## 2022-12-03 RX ADMIN — SERTRALINE HYDROCHLORIDE 200 MG: 50 TABLET ORAL at 08:17

## 2022-12-03 RX ADMIN — ATORVASTATIN CALCIUM 40 MG: 40 TABLET, FILM COATED ORAL at 21:09

## 2022-12-03 RX ADMIN — MORPHINE SULFATE 2 MG: 2 INJECTION, SOLUTION INTRAMUSCULAR; INTRAVENOUS at 08:18

## 2022-12-03 RX ADMIN — PANTOPRAZOLE SODIUM 40 MG: 40 TABLET, DELAYED RELEASE ORAL at 08:17

## 2022-12-03 RX ADMIN — SODIUM CHLORIDE, PRESERVATIVE FREE 10 ML: 5 INJECTION INTRAVENOUS at 08:18

## 2022-12-03 RX ADMIN — NADOLOL 20 MG: 20 TABLET ORAL at 08:17

## 2022-12-03 RX ADMIN — ENOXAPARIN SODIUM 30 MG: 100 INJECTION SUBCUTANEOUS at 08:18

## 2022-12-03 RX ADMIN — DILTIAZEM HYDROCHLORIDE 240 MG: 240 CAPSULE, COATED, EXTENDED RELEASE ORAL at 11:11

## 2022-12-03 RX ADMIN — MIRTAZAPINE 30 MG: 15 TABLET, FILM COATED ORAL at 21:08

## 2022-12-03 RX ADMIN — MORPHINE SULFATE 2 MG: 2 INJECTION, SOLUTION INTRAMUSCULAR; INTRAVENOUS at 21:11

## 2022-12-03 RX ADMIN — TRAMADOL HYDROCHLORIDE 50 MG: 50 TABLET, COATED ORAL at 05:41

## 2022-12-03 RX ADMIN — ONDANSETRON 4 MG: 2 INJECTION INTRAMUSCULAR; INTRAVENOUS at 08:18

## 2022-12-03 RX ADMIN — TRAMADOL HYDROCHLORIDE 50 MG: 50 TABLET, COATED ORAL at 17:42

## 2022-12-03 RX ADMIN — BENZONATATE 100 MG: 100 CAPSULE ORAL at 00:15

## 2022-12-03 ASSESSMENT — PAIN SCALES - GENERAL
PAINLEVEL_OUTOF10: 8
PAINLEVEL_OUTOF10: 7
PAINLEVEL_OUTOF10: 6
PAINLEVEL_OUTOF10: 8
PAINLEVEL_OUTOF10: 8

## 2022-12-03 ASSESSMENT — PAIN DESCRIPTION - LOCATION
LOCATION: HEAD
LOCATION: BACK
LOCATION: BACK

## 2022-12-03 NOTE — PROGRESS NOTES
Hospitalist Progress Note    Date of Admission: 11/28/2022    Chief Complaint: SOB    Hospital Course:   69 y.o. female who presented to [facility] with SOB. PMHx significant for Chronic Hypoxic Respiratory Failure, Pulmonary Fibrosis, Cryptogenic Organizing Pneumonia. Initially presented to [facility] and admitted to [facility] for Tension Pneumothorax. Chest tube was placed. Transferred to [facility] for surgical evaluation. Reports increasing SOB the afternoon of transfer. No chest pain. O2 requirement increased to 10L (baseline 3-4L). Subjective: CXR shows improving PTX. O2 requirement stable. Labs:   Recent Labs     12/01/22  0730 12/02/22  0754 12/03/22  0545   WBC 11.3* 9.8 12.8*   HGB 9.2* 9.7* 10.2*   HCT 30.2* 30.9* 32.5*    215 285       Recent Labs     12/01/22  0730 12/02/22  0754 12/03/22  0545    138 138   K 4.0 4.1 4.3   CL 99 99 99   CO2 37* 37* 36*   BUN 10 13 11   CREATININE <0.5* <0.5* <0.5*   CALCIUM 8.2* 8.3 8.6       No results for input(s): AST, ALT, BILIDIR, BILITOT, ALKPHOS in the last 72 hours. No results for input(s): INR in the last 72 hours. Physical Exam Performed:    /63   Pulse 89   Temp 97.4 °F (36.3 °C) (Oral)   Resp 20   Wt 104 lb 14.4 oz (47.6 kg)   SpO2 96%   BMI 18.58 kg/m²     General appearance:  No apparent distress, appears stated age and cooperative. HEENT:  Pupils equal, round, and reactive to light. Conjunctivae/corneas clear. Neck:  Supple, no jugular venous distention. Trachea midline with full range of motion. Respiratory:  Normal respiratory effort. Diminished on right > left. No wheezing. Cardiovascular:  Regular rate and rhythm with normal S1/S2 without murmurs, rubs or gallops. Abdomen:  Soft, non-tender, non-distended with normal bowel sounds. Musculoskelatal:  No clubbing, cyanosis or edema bilaterally. Full range of motion without deformity.   Neurologic:  Neurovascularly intact without any focal sensory/motor deficits. Cranial nerves: II-XII intact, grossly non-focal.  Psychiatric:  Alert and oriented, thought content appropriate, normal insight  Skin:  Skin color, texture, turgor normal.  No rashes or lesions. Capillary Refill:  Brisk,< 3 seconds   Peripheral Pulses:  +2 palpable, equal bilaterally     Consults:    IP CONSULT TO CARDIOLOGY  IP CONSULT TO CARDIOTHORACIC SURGERY    Assessment/Plan:    Active Hospital Problems    Diagnosis     Pneumothorax [J93.9]      Priority: Medium    Tension pneumothorax [J93.0]      Priority: Medium     Acute on chronic respiratory failure with Hypoxia 2/2 right tension pneumothorax   -Underlying Pulmonary Fibrosis and Cryptogenic Organizing Pneumonia  -Persistent PTX with suspicion for displaced chest tube  -CT Surgery consulted and new chest tube was placed  -Significant improvement with 2nd tube placement  -Pleurodesis now being deferred given resolution of PTX and potential for Lung Transplant in the future   -CXR showed recurrent PTX after chest tube clamping trial.   -Continue chest tube to suction.   -Latest CXR showed re-expansion  -Wean O2 as tolerated  -Plan for Pleurodesis when talc available (allergy to Doxycycline). Chronic diastolic CHF  -Appears compensated  -Medical management. Paroxysmal Afib with RVR - new diagnosis  -Back to NSR  -EP consulted  -TSH stable  -Considering AC pending surgical plans.    -Continue BB; patient/family expressed concerns about using Metoprolol in the past as it has caused chest pain. Discussed with EP and it would be ok to resume her Nadolol at 20 mg daily if she prefers.    -Plan for Cardiac Monitor at [place]. Diabetes Mellitus, Type 2: Controlled. Continue SSI. Monitor blood sugar and adjust regimen as needed. Diabetic (Carb-controlled) diet when able.       Hypothyroidism  -TSH stable  -continue synthroid     Iron Deficiency Anemia  -Recent  Hemorrhoidal bleed - Colonoscopy negative  -Hb stable  -continue PO iron  -Monitor Depression/Anxiety  -continue zoloft/remeron  -prn ativan     DVT Prophylaxis: Lovenox  Diet: ADULT DIET; Regular  ADULT ORAL NUTRITION SUPPLEMENT; Breakfast, Lunch, Dinner; Standard High Calorie/High Protein Oral Supplement  Code Status: Full Code  PT/OT Eval Status: NA    Dispo - pending surgery.     Appropriate for A1 Discharge Unit: Hortencia Simon MD

## 2022-12-03 NOTE — PROGRESS NOTES
CVTS Thoracic Progress Note:          CC:  right sided pneumothorax (recurrent from 09/22)    Subj: resting in bed, breathing with ease, in nad. Obj:    Blood pressure 129/80, pulse (!) 108, temperature 97.9 °F (36.6 °C), temperature source Oral, resp. rate 22, weight 104 lb 14.4 oz (47.6 kg), SpO2 96 %, not currently breastfeeding. Lungs ctab   Abdomen soft, non-tender    Incision with occlusive dressing,  CDI   Chest tube with 0 ml drainage in last 24 hours/+ expiratory airleak    Diagnostics:   Recent Labs     12/01/22  0730 12/02/22  0754 12/03/22  0545   WBC 11.3* 9.8 12.8*   HGB 9.2* 9.7* 10.2*   HCT 30.2* 30.9* 32.5*    215 285                                                                    Recent Labs     12/01/22  0730 12/02/22  0754 12/03/22  0545    138 138   K 4.0 4.1 4.3   CL 99 99 99   CO2 37* 37* 36*   BUN 10 13 11   CREATININE <0.5* <0.5* <0.5*   GLUCOSE 110* 118* 134*          CXR: 11/29/22   Impression   1. Significant improvement in the right-sided pneumothorax status post   placement of a pigtail pleural catheter. 2. Unchanged small bore chest tube within the right chest wall soft tissues. 3. Right upper extremity PICC tip projects over SVC. 4. Unchanged left-sided airspace opacities. Stable to mildly increased   right-sided airspace opacities. CXR: 11/30      Impression   1. No evidence of pneumothorax. 2.  Stable diffuse interstitial changes      CXR: 12/2/22 enlarged right pneumothorax      Assess/Plan:   Care per primary team and Pulmonology     Recurrent right ptx:   Removed original chest tube 11/30  Changed [facility] over to pneumostat 12/1. CXR this AM with enlarged ptx. Placed a dry seal and connected it to suction. XR on 12/3 shows improvement. I was going to offer chemical pleurodesis however she has a doxy allergy.  I will try and see if we can order talc.   __________________________________________________    Laurie Raymond, MD  12/3/2022  8:57 AM

## 2022-12-04 ENCOUNTER — APPOINTMENT (OUTPATIENT)
Dept: GENERAL RADIOLOGY | Age: 67
DRG: 199 | End: 2022-12-04
Attending: INTERNAL MEDICINE
Payer: MEDICARE

## 2022-12-04 LAB
GLUCOSE BLD-MCNC: 110 MG/DL (ref 70–99)
GLUCOSE BLD-MCNC: 115 MG/DL (ref 70–99)
GLUCOSE BLD-MCNC: 147 MG/DL (ref 70–99)
GLUCOSE BLD-MCNC: 159 MG/DL (ref 70–99)
PERFORMED ON: ABNORMAL

## 2022-12-04 PROCEDURE — 2060000000 HC ICU INTERMEDIATE R&B

## 2022-12-04 PROCEDURE — 2700000000 HC OXYGEN THERAPY PER DAY

## 2022-12-04 PROCEDURE — 6370000000 HC RX 637 (ALT 250 FOR IP): Performed by: INTERNAL MEDICINE

## 2022-12-04 PROCEDURE — 6370000000 HC RX 637 (ALT 250 FOR IP): Performed by: NURSE PRACTITIONER

## 2022-12-04 PROCEDURE — 6360000002 HC RX W HCPCS: Performed by: INTERNAL MEDICINE

## 2022-12-04 PROCEDURE — 99231 SBSQ HOSP IP/OBS SF/LOW 25: CPT | Performed by: STUDENT IN AN ORGANIZED HEALTH CARE EDUCATION/TRAINING PROGRAM

## 2022-12-04 PROCEDURE — 71045 X-RAY EXAM CHEST 1 VIEW: CPT

## 2022-12-04 PROCEDURE — 2580000003 HC RX 258: Performed by: INTERNAL MEDICINE

## 2022-12-04 PROCEDURE — 94761 N-INVAS EAR/PLS OXIMETRY MLT: CPT

## 2022-12-04 RX ADMIN — MIRTAZAPINE 30 MG: 15 TABLET, FILM COATED ORAL at 21:08

## 2022-12-04 RX ADMIN — SODIUM CHLORIDE, PRESERVATIVE FREE 10 ML: 5 INJECTION INTRAVENOUS at 08:37

## 2022-12-04 RX ADMIN — SERTRALINE HYDROCHLORIDE 200 MG: 50 TABLET ORAL at 08:36

## 2022-12-04 RX ADMIN — BENZONATATE 100 MG: 100 CAPSULE ORAL at 12:56

## 2022-12-04 RX ADMIN — ENOXAPARIN SODIUM 30 MG: 100 INJECTION SUBCUTANEOUS at 08:36

## 2022-12-04 RX ADMIN — LEVOTHYROXINE SODIUM 125 MCG: 0.12 TABLET ORAL at 06:29

## 2022-12-04 RX ADMIN — PANTOPRAZOLE SODIUM 40 MG: 40 TABLET, DELAYED RELEASE ORAL at 08:37

## 2022-12-04 RX ADMIN — MORPHINE SULFATE 2 MG: 2 INJECTION, SOLUTION INTRAMUSCULAR; INTRAVENOUS at 21:08

## 2022-12-04 RX ADMIN — GUAIFENESIN AND DEXTROMETHORPHAN 5 ML: 100; 10 SYRUP ORAL at 21:11

## 2022-12-04 RX ADMIN — ATORVASTATIN CALCIUM 40 MG: 40 TABLET, FILM COATED ORAL at 21:08

## 2022-12-04 RX ADMIN — DILTIAZEM HYDROCHLORIDE 240 MG: 240 CAPSULE, COATED, EXTENDED RELEASE ORAL at 08:36

## 2022-12-04 RX ADMIN — NADOLOL 20 MG: 20 TABLET ORAL at 08:36

## 2022-12-04 ASSESSMENT — PAIN SCALES - GENERAL: PAINLEVEL_OUTOF10: 9

## 2022-12-04 ASSESSMENT — PAIN DESCRIPTION - LOCATION: LOCATION: BACK

## 2022-12-04 NOTE — PROGRESS NOTES
CVTS Thoracic Progress Note:          CC:  right sided pneumothorax (recurrent from 09/22)    Subj: resting in bed, breathing with ease, in nad. Obj:    Blood pressure 136/81, pulse 89, temperature 97.7 °F (36.5 °C), temperature source Oral, resp. rate 20, weight 104 lb 14.4 oz (47.6 kg), SpO2 95 %, not currently breastfeeding. Lungs ctab   Abdomen soft, non-tender    Incision with occlusive dressing,  CDI   Chest tube with 0 ml drainage in last 24 hours/+ expiratory airleak    Diagnostics:   Recent Labs     12/02/22  0754 12/03/22  0545   WBC 9.8 12.8*   HGB 9.7* 10.2*   HCT 30.9* 32.5*    285                                                                    Recent Labs     12/02/22  0754 12/03/22  0545    138   K 4.1 4.3   CL 99 99   CO2 37* 36*   BUN 13 11   CREATININE <0.5* <0.5*   GLUCOSE 118* 134*          CXR: 11/29/22   Impression   1. Significant improvement in the right-sided pneumothorax status post   placement of a pigtail pleural catheter. 2. Unchanged small bore chest tube within the right chest wall soft tissues. 3. Right upper extremity PICC tip projects over SVC. 4. Unchanged left-sided airspace opacities. Stable to mildly increased   right-sided airspace opacities. CXR: 11/30      Impression   1. No evidence of pneumothorax. 2.  Stable diffuse interstitial changes      CXR: 12/2/22 enlarged right pneumothorax      Assess/Plan:   Care per primary team and Pulmonology     Recurrent right ptx:   Removed original chest tube 11/30  Changed Atrium over to pneumostat 12/1. CXR this AM with enlarged ptx. Placed a dry seal and connected it to suction. XR on 12/3 shows improvement. I was going to offer chemical pleurodesis however she has a doxy allergy. We will consider betadine or adriamycin pleurodesis on 12/5.  This will be bedside and she does not need to be NPO  __________________________________________________    Nathanael Siddiqi MD  12/4/2022  8:14 AM

## 2022-12-04 NOTE — PROGRESS NOTES
Hospitalist Progress Note    Date of Admission: 11/28/2022    Chief Complaint: SOB    Hospital Course:   69 y.o. female who presented to [facility] with SOB. PMHx significant for Chronic Hypoxic Respiratory Failure, Pulmonary Fibrosis, Cryptogenic Organizing Pneumonia. Initially presented to [address] and admitted to [facility] for Tension Pneumothorax. Chest tube was placed. Transferred to [facility] for surgical evaluation. Reports increasing SOB the afternoon of transfer. No chest pain. O2 requirement increased to 10L (baseline 3-4L). Subjective:  No SOB. O2 requirement stable. Labs:   Recent Labs     12/02/22  0754 12/03/22  0545   WBC 9.8 12.8*   HGB 9.7* 10.2*   HCT 30.9* 32.5*    285       Recent Labs     12/02/22  0754 12/03/22  0545    138   K 4.1 4.3   CL 99 99   CO2 37* 36*   BUN 13 11   CREATININE <0.5* <0.5*   CALCIUM 8.3 8.6       No results for input(s): AST, ALT, BILIDIR, BILITOT, ALKPHOS in the last 72 hours. No results for input(s): INR in the last 72 hours. Physical Exam Performed:    /67   Pulse 88   Temp 97.8 °F (36.6 °C) (Oral)   Resp 20   Wt 104 lb 14.4 oz (47.6 kg)   SpO2 95%   BMI 18.58 kg/m²     General appearance:  No apparent distress, appears stated age and cooperative. HEENT:  Pupils equal, round, and reactive to light. Conjunctivae/corneas clear. Neck:  Supple, no jugular venous distention. Trachea midline with full range of motion. Respiratory:  Normal respiratory effort. Diminished on right > left. No wheezing. Cardiovascular:  Regular rate and rhythm with normal S1/S2 without murmurs, rubs or gallops. Abdomen:  Soft, non-tender, non-distended with normal bowel sounds. Musculoskelatal:  No clubbing, cyanosis or edema bilaterally. Full range of motion without deformity. Neurologic:  Neurovascularly intact without any focal sensory/motor deficits.  Cranial nerves: II-XII intact, grossly non-focal.  Psychiatric:  Alert and oriented, thought content appropriate, normal insight  Skin:  Skin color, texture, turgor normal.  No rashes or lesions. Capillary Refill:  Brisk,< 3 seconds   Peripheral Pulses:  +2 palpable, equal bilaterally     Consults:    IP CONSULT TO CARDIOLOGY  IP CONSULT TO CARDIOTHORACIC SURGERY    Assessment/Plan:    Active Hospital Problems    Diagnosis     Pneumothorax [J93.9]      Priority: Medium    Tension pneumothorax [J93.0]      Priority: Medium     Acute on chronic respiratory failure with Hypoxia 2/2 right tension pneumothorax   -Underlying Pulmonary Fibrosis and Cryptogenic Organizing Pneumonia  -Persistent PTX with suspicion for displaced chest tube  -CT Surgery consulted and new chest tube was placed  -Significant improvement with 2nd tube placement  -CXR showed recurrent PTX after chest tube clamping trial.   -Continue chest tube to suction.   -Latest CXR showed re-expansion  -Wean O2 as tolerated  -Plan for Pleurodesis when talc available (allergy to Doxycycline). Chronic diastolic CHF  -Appears compensated  -Medical management. Paroxysmal Afib with RVR - new diagnosis  -Back to NSR  -EP consulted  -TSH stable  -Considering AC pending surgical plans.    -Continue BB; patient/family expressed concerns about using Metoprolol in the past as it has caused chest pain. Discussed with EP and it would be ok to resume her Nadolol at 20 mg daily if she prefers.    -Plan for Cardiac Monitor at [place]. Diabetes Mellitus, Type 2: Controlled. Continue SSI. Monitor blood sugar and adjust regimen as needed. Diabetic (Carb-controlled) diet when able. Hypothyroidism  -TSH stable  -continue synthroid     Iron Deficiency Anemia  -Recent  Hemorrhoidal bleed - Colonoscopy negative  -Hb stable  -continue PO iron  -Monitor     Depression/Anxiety  -continue zoloft/remeron  -prn ativan     DVT Prophylaxis: Lovenox  Diet: ADULT DIET;  Regular  ADULT ORAL NUTRITION SUPPLEMENT; Breakfast, Lunch, Dinner; Standard High Calorie/High Protein Oral Supplement  Code Status: Full Code  PT/OT Eval Status: NA    Dispo - pending CT Surgery plans.     Appropriate for A1 Discharge Unit: No    Kimberlee Rubio MD

## 2022-12-05 ENCOUNTER — APPOINTMENT (OUTPATIENT)
Dept: GENERAL RADIOLOGY | Age: 67
DRG: 199 | End: 2022-12-05
Attending: INTERNAL MEDICINE
Payer: MEDICARE

## 2022-12-05 PROBLEM — E43 SEVERE MALNUTRITION (HCC): Chronic | Status: ACTIVE | Noted: 2022-12-05

## 2022-12-05 LAB
GLUCOSE BLD-MCNC: 112 MG/DL (ref 70–99)
GLUCOSE BLD-MCNC: 112 MG/DL (ref 70–99)
GLUCOSE BLD-MCNC: 121 MG/DL (ref 70–99)
GLUCOSE BLD-MCNC: 128 MG/DL (ref 70–99)
PERFORMED ON: ABNORMAL

## 2022-12-05 PROCEDURE — 71045 X-RAY EXAM CHEST 1 VIEW: CPT

## 2022-12-05 PROCEDURE — 2580000003 HC RX 258: Performed by: INTERNAL MEDICINE

## 2022-12-05 PROCEDURE — 6370000000 HC RX 637 (ALT 250 FOR IP): Performed by: NURSE PRACTITIONER

## 2022-12-05 PROCEDURE — 99231 SBSQ HOSP IP/OBS SF/LOW 25: CPT | Performed by: NURSE PRACTITIONER

## 2022-12-05 PROCEDURE — 2700000000 HC OXYGEN THERAPY PER DAY

## 2022-12-05 PROCEDURE — 6370000000 HC RX 637 (ALT 250 FOR IP): Performed by: INTERNAL MEDICINE

## 2022-12-05 PROCEDURE — 2060000000 HC ICU INTERMEDIATE R&B

## 2022-12-05 PROCEDURE — 6360000002 HC RX W HCPCS: Performed by: INTERNAL MEDICINE

## 2022-12-05 PROCEDURE — 94761 N-INVAS EAR/PLS OXIMETRY MLT: CPT

## 2022-12-05 PROCEDURE — 92526 ORAL FUNCTION THERAPY: CPT

## 2022-12-05 RX ORDER — FUROSEMIDE 40 MG/1
40 TABLET ORAL DAILY
COMMUNITY

## 2022-12-05 RX ADMIN — PIRFENIDONE 267 MG: 267 TABLET, FILM COATED ORAL at 14:38

## 2022-12-05 RX ADMIN — DILTIAZEM HYDROCHLORIDE 240 MG: 240 CAPSULE, COATED, EXTENDED RELEASE ORAL at 09:25

## 2022-12-05 RX ADMIN — SERTRALINE HYDROCHLORIDE 200 MG: 50 TABLET ORAL at 09:25

## 2022-12-05 RX ADMIN — SODIUM CHLORIDE, PRESERVATIVE FREE 10 ML: 5 INJECTION INTRAVENOUS at 21:13

## 2022-12-05 RX ADMIN — PIRFENIDONE 267 MG: 267 TABLET, FILM COATED ORAL at 09:26

## 2022-12-05 RX ADMIN — BENZONATATE 100 MG: 100 CAPSULE ORAL at 14:38

## 2022-12-05 RX ADMIN — MIRTAZAPINE 30 MG: 15 TABLET, FILM COATED ORAL at 21:12

## 2022-12-05 RX ADMIN — ENOXAPARIN SODIUM 30 MG: 100 INJECTION SUBCUTANEOUS at 09:26

## 2022-12-05 RX ADMIN — MORPHINE SULFATE 2 MG: 2 INJECTION, SOLUTION INTRAMUSCULAR; INTRAVENOUS at 16:22

## 2022-12-05 RX ADMIN — ATORVASTATIN CALCIUM 40 MG: 40 TABLET, FILM COATED ORAL at 21:12

## 2022-12-05 RX ADMIN — SODIUM CHLORIDE, PRESERVATIVE FREE 10 ML: 5 INJECTION INTRAVENOUS at 09:26

## 2022-12-05 RX ADMIN — LEVOTHYROXINE SODIUM 125 MCG: 0.12 TABLET ORAL at 05:37

## 2022-12-05 RX ADMIN — NADOLOL 20 MG: 20 TABLET ORAL at 09:25

## 2022-12-05 RX ADMIN — MORPHINE SULFATE 2 MG: 2 INJECTION, SOLUTION INTRAMUSCULAR; INTRAVENOUS at 05:37

## 2022-12-05 RX ADMIN — MORPHINE SULFATE 2 MG: 2 INJECTION, SOLUTION INTRAMUSCULAR; INTRAVENOUS at 23:40

## 2022-12-05 RX ADMIN — PANTOPRAZOLE SODIUM 40 MG: 40 TABLET, DELAYED RELEASE ORAL at 09:25

## 2022-12-05 ASSESSMENT — PAIN SCALES - GENERAL
PAINLEVEL_OUTOF10: 10
PAINLEVEL_OUTOF10: 8

## 2022-12-05 ASSESSMENT — PAIN DESCRIPTION - DESCRIPTORS: DESCRIPTORS: SQUEEZING

## 2022-12-05 ASSESSMENT — PAIN DESCRIPTION - LOCATION
LOCATION: BACK
LOCATION: CHEST;BACK
LOCATION: BACK

## 2022-12-05 NOTE — PROGRESS NOTES
CVTS Thoracic Progress Note:          CC:  right sided pneumothorax (recurrent from 09/22)    Subj: No complaints. Obj:    Blood pressure 117/73, pulse (!) 102, temperature 97.7 °F (36.5 °C), temperature source Oral, resp. rate 18, height 5' 3\" (1.6 m), weight 104 lb 14.4 oz (47.6 kg), SpO2 97 %, not currently breastfeeding. Alert, oriented  Lungs ctab   Abdomen soft, non-tender   Chest tube with 15 ml drainage in last 24 hours/+ intermittent expiratory airleak    Diagnostics:   Recent Labs     12/03/22  0545   WBC 12.8*   HGB 10.2*   HCT 32.5*                                                                     Recent Labs     12/03/22  0545      K 4.3   CL 99   CO2 36*   BUN 11   CREATININE <0.5*   GLUCOSE 134*        CXR: 11/29/22   Impression   1. Significant improvement in the right-sided pneumothorax status post   placement of a pigtail pleural catheter. 2. Unchanged small bore chest tube within the right chest wall soft tissues. 3. Right upper extremity PICC tip projects over SVC. 4. Unchanged left-sided airspace opacities. Stable to mildly increased   right-sided airspace opacities. CXR: 11/30      Impression   1. No evidence of pneumothorax. 2.  Stable diffuse interstitial changes      CXR: 12/2/22 enlarged right pneumothorax    CXR: 12/5/2022  FINDINGS:   Small right lateral pneumothorax is unchanged. No large pneumothorax noted   on the right side. No left-sided pneumothorax. Right pleural catheter in   place. Mild cardiomegaly. Persistent bilateral interstitial lung disease. Right   PICC line tip in the superior vena cava right atrial junction. Significant osteopenic changes and degenerative changes noted in the bony   structures. Old left lower rib fracture similar to prior study. Impression   No significant interval change. Small right lateral pneumothorax. Unchanged bilateral interstitial lung disease.      Assess/Plan:   Labs and

## 2022-12-05 NOTE — PROGRESS NOTES
Hospitalist Progress Note      PCP: Felice Hugo MD    Date of Admission: 11/28/2022    Chief Complaint: SOB     Hospital Course:   69 y.o. female who presented to [facility] with SOB. PMHx significant for Chronic Hypoxic Respiratory Failure, Pulmonary Fibrosis, Cryptogenic Organizing Pneumonia. Initially presented to [place] and admitted to [facility] for Tension Pneumothorax. Chest tube was placed. Transferred to [place] for surgical evaluation. Reports increasing SOB the afternoon of transfer. No chest pain. O2 requirement increased to 10L (baseline 3-4L).       Subjective: resting in bed, no sob/cp complaints, CTube in place , on 6L oxygen      Medications:  Reviewed    Infusion Medications    dextrose      sodium chloride       Scheduled Medications    dilTIAZem  240 mg Oral Daily    Pirfenidone  267 mg Oral TID    nadolol  20 mg Oral Daily    atorvastatin  40 mg Oral Daily    levothyroxine  125 mcg Oral Daily    mirtazapine  30 mg Oral Nightly    pantoprazole  40 mg Oral QAM AC    sertraline  200 mg Oral Daily    sodium chloride flush  5-40 mL IntraVENous 2 times per day    insulin lispro  0-8 Units SubCUTAneous TID WC    insulin lispro  0-4 Units SubCUTAneous Nightly    enoxaparin  30 mg SubCUTAneous Daily     PRN Meds: guaiFENesin-dextromethorphan, hydrOXYzine HCl, LORazepam, morphine, metoprolol, traMADol, albuterol sulfate HFA, albuterol, glucose, dextrose bolus **OR** dextrose bolus, glucagon (rDNA), dextrose, sodium chloride flush, sodium chloride, ondansetron **OR** ondansetron, polyethylene glycol, acetaminophen **OR** acetaminophen, benzonatate      Intake/Output Summary (Last 24 hours) at 12/5/2022 1236  Last data filed at 12/5/2022 1125  Gross per 24 hour   Intake 40 ml   Output 815 ml   Net -775 ml       Physical Exam Performed:    /73   Pulse (!) 102   Temp 97.7 °F (36.5 °C) (Oral)   Resp 18   Wt 104 lb 14.4 oz (47.6 kg)   SpO2 97%   BMI 18.58 kg/m²     General appearance:  No apparent distress, appears stated age and cooperative. HEENT:  Pupils equal, round, and reactive to light. Conjunctivae/corneas clear. Neck:  Supple, no jugular venous distention. Trachea midline with full range of motion. Respiratory:  Normal respiratory effort. Diminished on right > left. No wheezing. Some crepitus noted  Cardiovascular:  Regular rate and rhythm with normal S1/S2 without murmurs, rubs or gallops. Abdomen:  Soft, non-tender, non-distended with normal bowel sounds. Musculoskelatal:  No clubbing, cyanosis or edema bilaterally. Full range of motion without deformity. Neurologic:  Neurovascularly intact without any focal sensory/motor deficits. Cranial nerves: II-XII intact, grossly non-focal.  Psychiatric:  Alert and oriented, thought content appropriate, normal insight  Skin:  Skin color, texture, turgor normal.  No rashes or lesions. Capillary Refill:  Brisk,< 3 seconds   Peripheral Pulses:  +2 palpable, equal bilaterally           Labs:   Recent Labs     12/03/22  0545   WBC 12.8*   HGB 10.2*   HCT 32.5*        Recent Labs     12/03/22  0545      K 4.3   CL 99   CO2 36*   BUN 11   CREATININE <0.5*   CALCIUM 8.6     No results for input(s): AST, ALT, BILIDIR, BILITOT, ALKPHOS in the last 72 hours. No results for input(s): INR in the last 72 hours. No results for input(s): [place] Knock in the last 72 hours. Urinalysis:      Lab Results   Component Value Date/Time    NITRU Negative 10/19/2022 08:30 PM    WBCUA None seen 10/19/2022 08:30 PM    BACTERIA Rare 05/28/2022 06:39 AM    RBCUA None seen 10/19/2022 08:30 PM    BLOODU Negative 10/19/2022 08:30 PM    SPECGRAV 1.010 10/19/2022 08:30 PM    GLUCOSEU Negative 10/19/2022 08:30 PM       Radiology:  XR CHEST PORTABLE   Final Result   No significant interval change. Small right lateral pneumothorax. Unchanged bilateral interstitial lung disease.          XR CHEST PORTABLE   Final Result   Supportive tubing, including right chest tube are in stable positions. Small right lateral pneumothorax is developed. Lungs are less inflated. Advanced bilateral interstitial lung disease. Suspected interval atelectasis in the lung bases. Developing   pneumonia/pulmonary edema alternatively possible. XR CHEST PORTABLE   Final Result   Near complete resolution of right lateral pneumothorax. XR CHEST PORTABLE   Final Result   1. Smaller RIGHT pneumothorax than previously, lateral measurement 13 mm,   previously 20 mm.   2. Unchanged right pleural catheter positioning. 3. Chronic pulmonary interstitial disease and probable emphysema bilateral   lungs. 4. No new infiltrate evident. 5. No pleural effusion evident. 6. Cardiomegaly. 7.  Calcific atherosclerotic disease aorta. XR CHEST PORTABLE   Final Result   Mildly enlarged right pneumothorax. XR CHEST PORTABLE   Final Result   Small recurrent pneumothorax lateral to the right lower lobe following   clamping of pleural catheter. The findings were sent to the Radiology Results [address] at 12:19   pm on 12/1/2022 to be communicated to a licensed caregiver. XR CHEST PORTABLE   Final Result   1. No evidence of pneumothorax. 2.  Stable diffuse interstitial changes         XR CHEST PORTABLE   Final Result   Radiographic resolution of right pneumothorax with stable pleural catheter. Diffuse pattern of interstitial change throughout the lungs that is otherwise   stable. XR CHEST PORTABLE   Final Result   1. Significant improvement in the right-sided pneumothorax status post   placement of a pigtail pleural catheter. 2. Unchanged small bore chest tube within the right chest wall soft tissues. 3. Right upper extremity PICC tip projects over SVC. 4. Unchanged left-sided airspace opacities. Stable to mildly increased   right-sided airspace opacities.          CT GUIDED PLEURAL DRAINAGE [facility]   Final Result   Successful CT-guided placement of a 14 French right-sided pigtail chest tube   as above. Chest tube was connected to pleura vac. CT GUIDED NEEDLE PLACEMENT   Final Result   Successful CT-guided placement of a right-sided 14 French pigtail chest tube. XR CHEST PORTABLE    (Results Pending)       IP CONSULT TO CARDIOLOGY  IP CONSULT TO CARDIOTHORACIC SURGERY    Assessment/Plan:    Active Hospital Problems    Diagnosis     Pneumothorax [J93.9]      Priority: Medium    Tension pneumothorax [J93.0]      Priority: Medium       Acute on chronic respiratory failure with Hypoxia 2/2 right tension pneumothorax   -Underlying Pulmonary Fibrosis and Cryptogenic Organizing Pneumonia  -Persistent PTX with suspicion for displaced chest tube  -CT Surgery consulted and new chest tube was placed  -Significant improvement with 2nd tube placement  -CXR showed recurrent PTX after chest tube clamping trial.   -Continue chest tube to suction.   -Latest CXR showed re-expansion  -Wean O2 as tolerated  -Plan for Pleurodesis when talc available (allergy to Doxycycline). Chronic diastolic CHF  -Appears compensated  -Medical management. Paroxysmal Afib with RVR - new diagnosis  -Back to NSR  -EP consulted  -TSH stable  -Considering AC pending surgical plans.    -Continue BB; patient/family expressed concerns about using Metoprolol in the past as it has caused chest pain. Discussed with EP and it would be ok to resume her Nadolol at 20 mg daily if she prefers.    -Planned for Cardiac Monitor at [place]. Diabetes Mellitus, Type 2: Controlled. Continue SSI. Monitor blood sugar and adjust regimen as needed. Diabetic (Carb-controlled) diet when able.       Hypothyroidism  -TSH stable  -continue synthroid     Iron Deficiency Anemia  -Recent  Hemorrhoidal bleed - Colonoscopy negative  -Hb stable  -continue PO iron  -Monitor     Depression/Anxiety  -continue zoloft/remeron  -prn ativan     DVT Prophylaxis: Lovenox  Diet: ADULT DIET;  Regular  ADULT ORAL NUTRITION SUPPLEMENT; Breakfast, Lunch, Dinner; Standard High Calorie/High Protein Oral Supplement  Code Status: Full Code  PT/OT Eval Status: not ordered    Dispo - pending ct surgery recs    Appropriate for A1 Discharge Unit: Hortencia Hebert MD

## 2022-12-05 NOTE — PROGRESS NOTES
Comprehensive Nutrition Assessment    Type and Reason for Visit:  Initial, RD Nutrition Re-Screen/LOS    Nutrition Recommendations/Plan:   Continue Regular diet as tolerated  Continue Ensure tid  Will monitor nutritional adequacy, nutrition-related labs, weights, BMs, and clinical progress      Malnutrition Assessment:  Malnutrition Status:  Severe malnutrition (12/05/22 1436)    Context:  Chronic Illness     Findings of the 6 clinical characteristics of malnutrition:  Energy Intake:  75% or less estimated energy requirements for 1 month or longer  Weight Loss:  Greater than 20% over 1 year     Body Fat Loss:  Severe body fat loss Orbital   Muscle Mass Loss:  Severe muscle mass loss Thigh (quadraceps), Calf (gastrocnemius), Clavicles (pectoralis & deltoids), Temples (temporalis)  Fluid Accumulation:  Unable to assess     Strength:  Not Performed    Nutrition Assessment:    LOS assessment. Patient admitted with pneumo thorax, transfer from [address] [place] [address] for surgical evaluation, chest tube replaced. Nutritional status compromised with significant weight loss over the past several months, almost 70 lbs. Currently on a regular diet with Ensure chocolate tid. Patient reported drinking the Ensure chocolate, snacks also at bed side. Meal po intake variable %. Patient reported taste function improving, has been decreased since having Covid back in September. Patient reported feeling much better, looking forward to seeing her dog again. Patient reported no needs at this time. Nutrition Related Findings:    Last BM 12/4; no swallowing difficulty reported by patient, confirmed with SLP on 12/5/22 Wound Type: None       Current Nutrition Intake & Therapies:    Average Meal Intake: 26-50%, 51-75%, %  Average Supplements Intake: Unable to assess  ADULT DIET;  Regular  ADULT ORAL NUTRITION SUPPLEMENT; Breakfast, Lunch, Dinner; Standard High Calorie/High Protein Oral Supplement    Anthropometric Measures:  Height: 5' 3\" (160 cm)  Ideal Body Weight (IBW): 115 lbs (52 kg)       Current Body Weight: 104 lb 14 oz (47.6 kg), 91.2 % IBW.  Weight Source: Standing Scale  Current BMI (kg/m2): 18.6                          BMI Categories: Underweight (BMI less than 22) age over 72    Estimated Daily Nutrient Needs:  Energy Requirements Based On: Kcal/kg  Weight Used for Energy Requirements: Current  Energy (kcal/day): 7358-6805 (30-35 kcal/47.6 kg)  Weight Used for Protein Requirements: Current  Protein (g/day): 71-86 (1.5-1.8 g/47.6 kg)  Method Used for Fluid Requirements: 1 ml/kcal  Fluid (ml/day):      Nutrition Diagnosis:   Severe malnutrition related to increase demand for energy/nutrients, inadequate protein-energy intake, altered taste perception as evidenced by poor intake prior to admission, BMI, severe muscle loss, severe loss of subcutaneous fat    Nutrition Interventions:   Food and/or Nutrient Delivery: Continue Current Diet, Continue Oral Nutrition Supplement  Nutrition Education/Counseling: Education not indicated  Coordination of Nutrition Care: Continue to monitor while inpatient  Plan of Care discussed with: patient    Goals:     Goals: PO intake 50% or greater       Nutrition Monitoring and Evaluation:      Food/Nutrient Intake Outcomes: Food and Nutrient Intake, Supplement Intake       Discharge Planning:    Continue Oral Nutrition Supplement, Continue current diet     RUDOLPH, [address] [facility], [address] [place] [address],   Contact: [phone]

## 2022-12-05 NOTE — PROGRESS NOTES
Speech Language Pathology  Facility/Department: [facility] C4 PCU  Dysphagia Daily Treatment Note    Recommendations:  Solid Consistency: IDDSI 7 Regular   Liquid Consistency: IDDSI 0 Thin   Medication: Meds whole with thin liquids (one pill at a time)    NAME: Elizabeth Obando  : 1955  MRN: [id number]    Patient Diagnosis(es):   Patient Active Problem List    Diagnosis Date Noted    Pneumothorax 2022    Tension pneumothorax 2022    SVT (supraventricular tachycardia) ([address] 10/22/2022    PSVT (paroxysmal supraventricular tachycardia) ([address] 10/21/2022    PAF (paroxysmal atrial fibrillation) ([address] 10/21/2022    Diarrhea 10/20/2022    Pulmonary fibrosis ([address] 10/20/2022    Acute on chronic respiratory failure ([address] 10/19/2022    COVID-19 09/15/2022    Secondary spontaneous pneumothorax 2022    Pneumonia due to COVID-19 virus 09/10/2022    Primary spontaneous pneumothorax 09/10/2022    Severe malnutrition ([address] 2022    H/O Spinal surgery 2022    Hypomagnesemia 07/15/2022    Depression 07/15/2022    Chronic diastolic congestive heart failure ([address] 07/15/2022    Interstitial lung disease ([address] 2022    Burst fracture of lumbar vertebra ([address] 2022    Rib pain on left side     Elevated brain natriuretic peptide (BNP) level     Fracture of multiple ribs of left side 2022    Acute respiratory failure with hypoxia ([address] 2022    VIRGINIE (acute kidney injury) ([address]     Lumbar radiculopathy     Toxic metabolic encephalopathy     GERD (gastroesophageal reflux disease)     Left wrist pain 2020    Left wrist tendinitis 2020    Acute congestive heart failure ([address] 2020    Ulnar neuropathy at elbow of left upper extremity 2020    Numbness and tingling in left hand 01/10/2020    Hyponatremia 2019    Type 2 diabetes mellitus without complication [facility]     Cryptogenic organizing pneumonia ([facility] 2019    Pneumothorax of left lung after biopsy 08/21/2019    COPD (chronic obstructive pulmonary disease) ([facility] 08/16/2019    Acute on chronic respiratory failure with hypoxemia ([facility])     Dyspnea and respiratory abnormalities 08/09/2019    Acute cystitis without hematuria     Abnormal CT of the chest     SOB (shortness of breath)     Acute on chronic respiratory failure with hypoxia ([facility])     Restrictive lung disease     Abnormal chest CT     Acute diastolic heart failure ([facility])     ILD (interstitial lung disease) ([facility]     Atypical pneumonia     Acute bronchitis with bronchospasm     Acute on chronic diastolic CHF (congestive heart failure) ([facility])     Class 2 obesity with body mass index (BMI) of 39.0 to 39.9 in adult     [facility])     Pneumonia of both lower lobes due to infectious organism 06/16/2019    Mild single current episode of major depressive disorder ([facility] 05/02/2019    Anxiety 05/02/2019    Hypothyroidism 07/31/2018    Acute blood loss anemia 04/25/2016    Fatigue 04/25/2016    Migraine 08/04/2014    Syncope 08/04/2014    HTN (hypertension) 04/08/2014    Hyperlipidemia with target LDL less than 130 04/08/2014    Hypokalemia 04/08/2014    Insomnia 04/08/2014    Trochanteric bursitis of both hips 04/08/2014    Chest pain 04/02/2014     Allergies: Allergies   Allergen Reactions    Penicillins Anaphylaxis     Tolerates Meropenem. Cefuroxime      Tolerates Meropenem. Doxycycline Hives    Imitrex [Sumatriptan] Other (See Comments)     Feels like pins and needles    Meperidine     Other Other (See Comments)     Blood pressure drops, light headed    Sulfa Antibiotics Hives    Keflex [Cephalexin] Itching and Rash     Tolerates Meropenem. Tape [place] Tape] Rash     Subjective: RN ok'd SLP entry. Pt pleasant and agreeable for PO trials. Pain: The patient does not complain of pain     Current Diet: ADULT DIET;  Regular  ADULT ORAL NUTRITION SUPPLEMENT; Breakfast, Lunch, Dinner; Standard High Calorie/High Protein Oral Supplement    Diet Tolerance:  Patient tolerating current diet level without signs/symptoms of penetration / aspiration. P.O. Trials: Thin   X  X2 sips via cup    Solid   X  X1 bite     Dysphagia Treatment and Impressions:  MICK castro SLP entry. Pt agreeable for PO trials. Pt reported tolerating diet with no issues. Pt trialed with thin liquids and regular. No clinical s/s of aspiration noted. Pt's vitals remained stable throughout PO trials. SLP ed pt: safe swallow strategies, compensatory strategies, and aspiration precautions. Pt verbalized understanding. RN aware. SLP rec IDDSI 7 Regular solids; IDDSI 0 Thin liquids; meds whole with thin liquids (one pill at a time). RN aware. ST to follow. Dysphagia Goals:  Timeframe for Long-term Goals: 7 days 12/05/2022  Goal 1: The patient will tolerate least restrictive diet with no clinical s/s of aspiration or worsening respiratory/pulmonary status  12/5/2022 : Ongoing, progressing. Short-term Goals  Timeframe for Short-term Goals: 5 days 12/03/2022  Goal 1: The patient will tolerate recommended diet with no clinical s/s of aspiration 5/5 12/5/2022 : Goal addressed, see above. Ongoing, progressing. Goal 2: The patient will recall/perform recommended compensatory strategies given min cues  12/5/2022 : Goal addressed, see above. Ongoing, progressing. SLP ed pt: safe swallow strategies, compensatory strategies and aspiration precautions. Pt verbalized understanding. RN aware. Speech/Language/Cog Goals: N/a    Recommendations:  Solid Consistency: IDDSI 7 Regular   Liquid Consistency: IDDSI 0 Thin   Medication: Meds whole with thin liquids (one pill at a time)    Patient/Family/Caregiver Education: SLP ed pt: safe swallow strategies, compensatory strategies and aspiration precautions. Pt verbalized understanding. RN aware.      Compensatory Strategies: HOB 90* and 30\" after meals; small bites/sips; alternate solids/liquids every 3-5 bites; oral care after every meal    Plan:    Continued Dysphagia treatment with goals per plan of care. Discharge Recommendations: Defer to PT/OT     If pt discharges from hospital prior to Speech/Swallowing discharge, this note serves as tx and discharge summary.      Total Treatment Time / Charges     Time in Time out Total Time / units   Cognitive Tx         Speech Tx      Dysphagia Tx 1308 1316 8 min/ 1 unit      Signature:  Madan AVILA-SLP  Clinical Fellow  [id number]

## 2022-12-05 NOTE — PROCEDURES
[address] [facility]                 [facility]                               PULMONARY FUNCTION    PATIENT NAME: Janel Mcelroy             :        1955  MED REC NO:   [id number]                          ROOM:  ACCOUNT NO:   [de-identified]                           ADMIT DATE: 2022  PROVIDER:     Burak Estevez MD    DATE OF PROCEDURE:  2022    PFT    Spirometry showed FVC 1.16 L, 40% predicted; FEV1 0.9 L, 44% predicted  with ratio of 83%. 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 I  recommend full PFT if clinically indicated. Pure restrictive defects  can be seen in patients with parenchymal pulmonary disease, pleural  disease, musculoskeletal problems, or body habitus. Nayeli Kay MD    D: 2022 15:29:26       T: 2022 18:56:25     [email or web address]  Job#: [id number]     Doc#: [id number]    CC:   MD Nora Vasquez MD Christie Pester, MD

## 2022-12-06 ENCOUNTER — APPOINTMENT (OUTPATIENT)
Dept: GENERAL RADIOLOGY | Age: 67
DRG: 199 | End: 2022-12-06
Attending: INTERNAL MEDICINE
Payer: MEDICARE

## 2022-12-06 LAB
GLUCOSE BLD-MCNC: 119 MG/DL (ref 70–99)
GLUCOSE BLD-MCNC: 137 MG/DL (ref 70–99)
GLUCOSE BLD-MCNC: 144 MG/DL (ref 70–99)
GLUCOSE BLD-MCNC: 167 MG/DL (ref 70–99)
PERFORMED ON: ABNORMAL

## 2022-12-06 PROCEDURE — 99024 POSTOP FOLLOW-UP VISIT: CPT | Performed by: NURSE PRACTITIONER

## 2022-12-06 PROCEDURE — 6370000000 HC RX 637 (ALT 250 FOR IP): Performed by: INTERNAL MEDICINE

## 2022-12-06 PROCEDURE — 6360000002 HC RX W HCPCS: Performed by: INTERNAL MEDICINE

## 2022-12-06 PROCEDURE — 2700000000 HC OXYGEN THERAPY PER DAY

## 2022-12-06 PROCEDURE — 71045 X-RAY EXAM CHEST 1 VIEW: CPT

## 2022-12-06 PROCEDURE — 2060000000 HC ICU INTERMEDIATE R&B

## 2022-12-06 PROCEDURE — 2580000003 HC RX 258: Performed by: INTERNAL MEDICINE

## 2022-12-06 PROCEDURE — 94761 N-INVAS EAR/PLS OXIMETRY MLT: CPT

## 2022-12-06 RX ADMIN — MIRTAZAPINE 30 MG: 15 TABLET, FILM COATED ORAL at 21:42

## 2022-12-06 RX ADMIN — PIRFENIDONE 267 MG: 267 TABLET, FILM COATED ORAL at 09:36

## 2022-12-06 RX ADMIN — LEVOTHYROXINE SODIUM 125 MCG: 0.12 TABLET ORAL at 05:05

## 2022-12-06 RX ADMIN — PANTOPRAZOLE SODIUM 40 MG: 40 TABLET, DELAYED RELEASE ORAL at 09:36

## 2022-12-06 RX ADMIN — PIRFENIDONE 267 MG: 267 TABLET, FILM COATED ORAL at 15:21

## 2022-12-06 RX ADMIN — MORPHINE SULFATE 2 MG: 2 INJECTION, SOLUTION INTRAMUSCULAR; INTRAVENOUS at 21:56

## 2022-12-06 RX ADMIN — DILTIAZEM HYDROCHLORIDE 240 MG: 240 CAPSULE, COATED, EXTENDED RELEASE ORAL at 09:36

## 2022-12-06 RX ADMIN — MORPHINE SULFATE 2 MG: 2 INJECTION, SOLUTION INTRAMUSCULAR; INTRAVENOUS at 05:06

## 2022-12-06 RX ADMIN — SODIUM CHLORIDE, PRESERVATIVE FREE 10 ML: 5 INJECTION INTRAVENOUS at 21:43

## 2022-12-06 RX ADMIN — ENOXAPARIN SODIUM 30 MG: 100 INJECTION SUBCUTANEOUS at 09:36

## 2022-12-06 RX ADMIN — ATORVASTATIN CALCIUM 40 MG: 40 TABLET, FILM COATED ORAL at 21:42

## 2022-12-06 RX ADMIN — SERTRALINE HYDROCHLORIDE 200 MG: 50 TABLET ORAL at 09:36

## 2022-12-06 RX ADMIN — SODIUM CHLORIDE, PRESERVATIVE FREE 10 ML: 5 INJECTION INTRAVENOUS at 09:36

## 2022-12-06 RX ADMIN — MORPHINE SULFATE 2 MG: 2 INJECTION, SOLUTION INTRAMUSCULAR; INTRAVENOUS at 15:02

## 2022-12-06 RX ADMIN — NADOLOL 20 MG: 20 TABLET ORAL at 09:36

## 2022-12-06 ASSESSMENT — PAIN SCALES - GENERAL
PAINLEVEL_OUTOF10: 8
PAINLEVEL_OUTOF10: 9
PAINLEVEL_OUTOF10: 8

## 2022-12-06 ASSESSMENT — PAIN DESCRIPTION - LOCATION
LOCATION: BACK
LOCATION: BACK

## 2022-12-06 NOTE — PROGRESS NOTES
Progress Note    CC: right sided pneumothorax (recurrent from 9/22)    Vital Signs:                                                 BP 98/61   Pulse 89   Temp 97.8 °F (36.6 °C) (Oral)   Resp 17   Ht 5' 3\" (1.6 m)   Wt 104 lb 14.4 oz (47.6 kg)   SpO2 98%   BMI 18.58 kg/m²  O2 Flow Rate (L/min): 6 L/min        Admission Weight: 109 lb 6.4 oz (49.6 kg)        I/O:    Intake/Output Summary (Last 24 hours) at 12/6/2022 1000  Last data filed at 12/6/2022 0316  Gross per 24 hour   Intake 245 ml   Output 1680 ml   Net -1435 ml     Chest Tube: 30; serous    General: awake, alert and oriented; \" I have a headache right now but otherwise, I'm okay\"  CV: regular  Pulm: CTA; chest tube dressing dry and intact, no crepitus noted  Abd: soft, non tender  Ext: warm and dry  Neuro: No focal deficits, moves all extremities with equal strength bilat    Data Review:  CBC: No results for input(s): WBC, HGB, HCT, MCV, PLT in the last 72 hours. BMP: No results for input(s): NA, K, CL, CO2, PHOS, BUN, CREATININE, CALCIUM, MG in the last 72 hours. Invalid input(s): KRFLXMG  Cardiac Enzymes: No results for input(s): CKTOTAL, CKMB, CKMBINDEX, TROPONINI in the last 72 hours. PT/INR: No results for input(s): PROTIME, INR in the last 72 hours. APTT: No results for input(s): APTT in the last 72 hours. CXR  12/6/2022  No recurrent pneumothorax. Chronic pattern of interstitial lung disease, stable    Assessment/Plan:  Original chest tube removed 11/30  Changed [facility] over to pneumostat 12/1 12/5 CXR stable, no recurrent pneumothorax  Okay to discharge to home with pneumostat   Follow up with Dr. Leidy Elliott in the office in one week with CXR prior to follow up appointment. Yanely Mena, PANDA - CNP  12/6/2022  10:00 AM

## 2022-12-06 NOTE — DISCHARGE INSTR - COC
Continuity of Care Form    Patient Name: Yolette Musa   :  1955  MRN:  [id number]    Admit date:  2022  Discharge date:  2022    Code Status Order: Full Code   Advance Directives:     Admitting Physician:  No admitting provider for patient encounter. PCP: Jordan Mallory MD    Discharging Nurse: April Stanley RN  [address] Unit/Room#: 7442/9758-30  Discharging Unit Phone Number: [phone]    Emergency Contact:   Extended Emergency Contact Information  Primary Emergency Contact: [facility] access hospital  Address: [address] [place] [address] Phone: [phone]  Mobile Phone: [phone]  Relation: Spouse  Secondary Emergency Contact: [facility] [address]  Address: [address] Phone: [phone]  Mobile Phone: [phone]  Relation: Child    Past Surgical History:  Past Surgical History:   Procedure Laterality Date    ARM SURGERY Left 3/2/2020    LEFT ULNAR NERVE DECOMPRESSION AT THE ELBOW performed by Fely Milligan MD at [address] [place] [address] 2019    EBUS WF W/ANES.  performed by En Diaz MD at [facility]  2019    BRONCHOSCOPY/TRANSBRONCHIAL NEEDLE BIOPSY performed by En Diaz MD at [facility]  2019    BRONCHOSCOPY/TRANSBRONCHIAL LUNG BIOPSY performed by En Diaz MD at [facility]  2019    BRONCHOSCOPY ALVEOLAR LAVAGE performed by En Diaz MD at [facility]  07/10/2019    BRONCHOSCOPY N/A 7/10/2019    BRONCHOSCOPY ALVEOLAR LAVAGE performed by Jose Limon MD at [facility] Bilateral 2019    BRONCHOSCOPY N/A 2019    BRONCHOSCOPY ALVEOLAR LAVAGE performed by Colton Jessica MD at [facility] N/A 2019    BRONCHOSCOPY ALVEOLAR LAVAGE performed by Maira Olsen MD at [address] [place] [address] CHOLECYSTECTOMY      COLONOSCOPY N/A 8/25/2022    COLONOSCOPY POLYPECTOMY SNARE/COLD BIOPSY performed by Didier Bey MD at [address] [place]  9/15/2022    CT GUIDED CHEST TUBE 9/15/2022 [address] SCAN    CT INSERT CATH PLEURA W IMAGE  11/28/2022    CT INSERT CATH PLEURA W IMAGE 11/28/2022 Jennifer Horowitz MD [facility] CT SCAN    HYSTERECTOMY, TOTAL ABDOMINAL (CERVIX REMOVED)      partial       Immunization History:   Immunization History   Administered Date(s) Administered    COVID-19, MODERNA BLUE border, Primary or Immunocompromised, (age 12y+), IM, 100 mcg/0.5mL 03/25/2021, 04/22/2021    Influenza Vaccine, unspecified formulation 02/13/2016    Influenza Virus Vaccine 01/15/2016, 02/13/2016    Influenza, FLUAD, (age 72 y+), Adjuvanted, 0.5mL 11/23/2020, 11/17/2021    Influenza, Triv, inactivated, subunit, adjuvanted, IM (Fluad 65 yrs and older) 11/14/2019    Pneumococcal Conjugate 13-valent ([id number]) 06/18/2019    Pneumococcal Polysaccharide ([id number]) 11/14/2013, 04/03/2014    Tdap (Boostrix, Adacel) 02/04/2021       Active Problems:  Patient Active Problem List   Diagnosis Code    Chest pain R07.9    HTN (hypertension) I10    Hyperlipidemia with target LDL less than 130 E78.5    Hypokalemia E87.6    Insomnia G47.00    Trochanteric bursitis of both hips M70.61, M70.62    Migraine G43.909    Syncope R55    Acute blood loss anemia D62    Fatigue R53.83    Hypothyroidism E03.9    Mild single current episode of major depressive disorder ([facility]) F32.0    Anxiety F41.9    Pneumonia of both lower lobes due to infectious organism J18.9    A-fib ([facility]) I48.91    Atypical pneumonia J18.9    Acute bronchitis with bronchospasm J20.9    Acute on chronic diastolic CHF (congestive heart failure) ([facility]) I50.33    Class 2 obesity with body mass index (BMI) of 39.0 to 39.9 in adult E66.9, Z68.39    Abnormal chest CT P91.01    Acute diastolic heart failure ([facility]) I50.31    ILD (interstitial lung disease) ([facility] J84.9    Acute on chronic respiratory failure with hypoxia ([facility]) J96.21    Restrictive lung disease J98.4    Abnormal CT of the chest R93.89    SOB (shortness of breath) R06.02    Acute cystitis without hematuria N30.00    Dyspnea and respiratory abnormalities R06.00, R06.89    Acute on chronic respiratory failure with hypoxemia ([facility]) J96.21    Cryptogenic organizing pneumonia ([facility]) J84.116    Pneumothorax of left lung after biopsy J95.811    COPD (chronic obstructive pulmonary disease) ([facility]) J44.9    Type 2 diabetes mellitus without complication ([facility]) D31.3    Hyponatremia E87.1    Numbness and tingling in left hand R20.0, R20.2    Ulnar neuropathy at elbow of left upper extremity G56.22    Acute congestive heart failure ([facility]) I50.9    Left wrist pain M25.532    Left wrist tendinitis M77.8    GERD (gastroesophageal reflux disease) S04.1    Toxic metabolic encephalopathy T97.4    VIRGINIE (acute kidney injury) ([facility]) N17.9    Lumbar radiculopathy M54.16    Acute respiratory failure with hypoxia ([facility]) J96.01    Rib pain on left side R07.81    Elevated brain natriuretic peptide (BNP) level R79.89    Fracture of multiple ribs of left side S22.42XA    Interstitial lung disease ([facility]) J84.9    Hypomagnesemia E83.42    Depression F32. A    Chronic diastolic congestive heart failure ([facility]) I50.32    Burst fracture of lumbar vertebra ([facility] S32.001A    H/O Spinal surgery Z98.890    Severe malnutrition ([facility] E43    Pneumonia due to COVID-19 virus U07.1, J12.82    Primary spontaneous pneumothorax J93.11    Secondary spontaneous pneumothorax J93.12    COVID-19 U07.1    Acute on chronic respiratory failure ([facility]) J96.20    Diarrhea R19.7    Pulmonary fibrosis ([facility]) J84.10    PSVT (paroxysmal supraventricular tachycardia) ([facility]) I47.1    PAF (paroxysmal atrial fibrillation) ([facility]) I48.0    SVT (supraventricular tachycardia) ([facility]) I47.1    Pneumothorax J93.9    Tension pneumothorax J93.0       Isolation/Infection:   Isolation            No Isolation          Patient Infection Status       Infection Onset Added Last Indicated Last Indicated By Review Planned Expiration Resolved Resolved By    None active    Resolved    COVID-19 (Rule Out) 11/26/22 11/26/22 11/26/22 COVID-19, Rapid (Ordered)   11/26/22 Rule-Out Test Resulted    COVID-19 (Rule Out) 10/19/22 10/19/22 10/19/22 COVID-19 & Influenza Combo (Ordered)   10/19/22 Rule-Out Test Resulted    C-diff Rule Out 10/19/22 10/19/22 10/19/22 GI Bacterial Pathogens By PCR (Ordered)   10/19/22 Rule-Out Test Resulted    COVID-19 09/09/22 09/09/22 09/09/22 COVID-19 & Influenza Combo   09/19/22 Regina Silva RN    Pt is asymptomatic and 5 days or greater since positive testing, per Dr and PCU staff,     COVID-19 (Rule Out) 09/09/22 09/09/22 09/09/22 COVID-19 & Influenza Combo (Ordered)   09/09/22 Rule-Out Test Resulted    COVID-19 (Rule Out) 07/14/22 07/14/22 07/14/22 COVID-19, Rapid (Ordered)   07/14/22 Rule-Out Test Resulted    COVID-19 (Rule Out) 07/04/22 07/04/22 07/04/22 COVID-19, Rapid (Ordered)   07/04/22 Rule-Out Test Resulted    COVID-19 (Rule Out) 05/06/22 05/06/22 05/06/22 COVID-19, Rapid (Ordered)   05/06/22 Rule-Out Test Resulted    COVID-19 (Rule Out) 04/18/22 04/18/22 04/18/22 COVID-19 & Influenza Combo (Ordered)   04/18/22 Rule-Out Test Resulted    COVID-19 (Rule Out) 01/25/22 01/25/22 01/25/22 COVID-19 & Influenza Combo (Ordered)   01/25/22 Rule-Out Test Resulted    COVID-19 (Rule Out) 01/24/22 01/24/22 01/24/22 COVID-19, Rapid (Ordered)   01/24/22 Rule-Out Test Resulted    COVID-19 (Rule Out) 06/18/21 06/18/21 06/18/21 COVID-19 & Influenza Combo (Ordered)   06/18/21 Rule-Out Test Resulted            Nurse Assessment:  Last Vital Signs: /81   Pulse 82   Temp 97.9 °F (36.6 °C) (Oral)   Resp 18   Ht 5' 3\" (1.6 m)   Wt 104 lb 14.4 oz (47.6 kg)   SpO2 95%   BMI 18.58 kg/m²     Last documented pain score (0-10 scale): Pain Level: 8  Last Weight:   Wt Readings from Last 1 Encounters: 12/03/22 104 lb 14.4 oz (47.6 kg)     Mental Status:  oriented and alert    IV Access:  - None    Nursing Mobility/ADLs:  Walking   Assisted  Transfer  Assisted  Bathing  Assisted  Dressing  Assisted  Toileting  Assisted  Feeding  [facility] Delivery   whole    Wound Care Documentation and Therapy:        Elimination:  Continence: Bowel: Yes  Bladder: Yes  Urinary Catheter: None   Colostomy/Ileostomy/Ileal Conduit: No       Date of Last BM: 12/9/2022    Intake/Output Summary (Last 24 hours) at 12/6/2022 1322  Last data filed at 12/6/2022 1017  Gross per 24 hour   Intake 365 ml   Output 1180 ml   Net -815 ml     I/O last 3 completed shifts: In: 245 [P.O.:245]  Out: 1680 [Urine:1650; Chest Tube:30]    Safety Concerns: At Risk for Falls    Impairments/Disabilities:      None    Nutrition Therapy:  Current Nutrition Therapy:   - Oral Diet:  General    Routes of Feeding: Oral  Liquids: Thin Liquids  Daily Fluid Restriction: no  Last Modified Barium Swallow with Video (Video Swallowing Test): not done    Treatments at the Time of Hospital Discharge:   Respiratory Treatments: as listed in med list   Oxygen Therapy:  is on oxygen at 4 L/min per nasal cannula.   Ventilator:    - No ventilator support    Rehab Therapies: Physical Therapy and Occupational Therapy  Weight Bearing Status/Restrictions: No weight bearing restrictions  Other Medical Equipment (for information only, NOT a DME order):  bedside commode  Other Treatments: plurex cath    Patient's personal belongings (please select all that are sent with patient):  None    RN SIGNATURE:  Electronically signed by Rangel Concepcion RN on 12/9/22 at 10:29 AM EST    CASE MANAGEMENT/SOCIAL WORK SECTION    Inpatient Status Date: 11/28/22    Readmission Risk Assessment Score:  Readmission Risk              Risk of Unplanned Readmission:  44           Discharging to Facility/ Agency   Name: [address] [place] [address]  Phone: [phone]  Fax: [phone]    / signature: Electronically signed by Elina Wallis RN on 12/9/22 at 8:14 AM EST    PHYSICIAN SECTION    Prognosis: Fair    Condition at Discharge: Stable    Rehab Potential (if transferring to Rehab): Fair    Recommended Labs or Other Treatments After Discharge: Change the chest tube dressing every other day and PRN. Drain the Pneumostat daily and PRN; record drainage.     -Follow up with CT surgery as outpatient as instructed    -Follow up with your lung doctor in 1 week,please call to make hospital follow-up appointment    -Follow up with [facility] EP cardiology as instructed    Physician Certification: I certify the above information and transfer of Jannet Perez  is necessary for the continuing treatment of the diagnosis listed and that she requires Home Care for less 30 days.      Update Admission H&P: No change in H&P    PHYSICIAN SIGNATURE:  Electronically signed by Liyah Freeman MD on 12/7/22 at 3:46 PM EST

## 2022-12-06 NOTE — PROGRESS NOTES
Hospitalist Progress Note      PCP: Kev aMrie MD    Date of Admission: 11/28/2022    Chief Complaint: SOB     Hospital Course:   69 y.o. female who presented to [facility] with SOB. PMHx significant for Chronic Hypoxic Respiratory Failure, Pulmonary Fibrosis, Cryptogenic Organizing Pneumonia. Initially presented to [address] and admitted to [facility] for Tension Pneumothorax. Chest tube was placed. Transferred to [facility] for surgical evaluation. Reports increasing SOB the afternoon of transfer. No chest pain. O2 requirement increased to 10L (baseline 3-4L).        Subjective:  resting in bed, no sob/cp complaints, portable atrium/CTube in place , on 7L oxygen,  at bedside      Medications:  Reviewed    Infusion Medications    dextrose      sodium chloride       Scheduled Medications    dilTIAZem  240 mg Oral Daily    Pirfenidone  267 mg Oral TID    nadolol  20 mg Oral Daily    atorvastatin  40 mg Oral Daily    levothyroxine  125 mcg Oral Daily    mirtazapine  30 mg Oral Nightly    pantoprazole  40 mg Oral QAM AC    sertraline  200 mg Oral Daily    sodium chloride flush  5-40 mL IntraVENous 2 times per day    insulin lispro  0-8 Units SubCUTAneous TID WC    insulin lispro  0-4 Units SubCUTAneous Nightly    enoxaparin  30 mg SubCUTAneous Daily     PRN Meds: guaiFENesin-dextromethorphan, hydrOXYzine HCl, LORazepam, morphine, metoprolol, traMADol, albuterol sulfate HFA, albuterol, glucose, dextrose bolus **OR** dextrose bolus, glucagon (rDNA), dextrose, sodium chloride flush, sodium chloride, ondansetron **OR** ondansetron, polyethylene glycol, acetaminophen **OR** acetaminophen, benzonatate      Intake/Output Summary (Last 24 hours) at 12/6/2022 0935  Last data filed at 12/6/2022 0316  Gross per 24 hour   Intake 245 ml   Output 1680 ml   Net -1435 ml       Physical Exam Performed:    BP 98/61   Pulse 89   Temp 97.8 °F (36.6 °C) (Oral)   Resp 17   Ht 5' 3\" (1.6 m)   Wt 104 lb 14.4 oz (47.6 kg)   SpO2 98%   BMI 18.58 kg/m²     General appearance:  No apparent distress, appears stated age and cooperative. HEENT:  Pupils equal, round, and reactive to light. Conjunctivae/corneas clear. Neck:  Supple, no jugular venous distention. Trachea midline with full range of motion. Respiratory:  Normal respiratory effort. Diminished on right > left. No wheezing. Some crepitus noted  Cardiovascular:  Regular rate and rhythm with normal S1/S2 without murmurs, rubs or gallops. Abdomen:  Soft, non-tender, non-distended with normal bowel sounds. Musculoskelatal:  No clubbing, cyanosis or edema bilaterally. Full range of motion without deformity. Neurologic:  Neurovascularly intact without any focal sensory/motor deficits. Cranial nerves: II-XII intact, grossly non-focal.  Psychiatric:  Alert and oriented, thought content appropriate, normal insight  Skin:  Skin color, texture, turgor normal.  No rashes or lesions. Capillary Refill:  Brisk,< 3 seconds   Peripheral Pulses:  +2 palpable, equal bilaterally       Labs:   No results for input(s): WBC, HGB, HCT, PLT in the last 72 hours. No results for input(s): NA, K, CL, CO2, BUN, CREATININE, CALCIUM, PHOS in the last 72 hours. Invalid input(s): MAGNES  No results for input(s): AST, ALT, BILIDIR, BILITOT, ALKPHOS in the last 72 hours. No results for input(s): INR in the last 72 hours. No results for input(s): Aleene Sauger in the last 72 hours.     Urinalysis:      Lab Results   Component Value Date/Time    NITRU Negative 10/19/2022 08:30 PM    WBCUA None seen 10/19/2022 08:30 PM    BACTERIA Rare 05/28/2022 06:39 AM    RBCUA None seen 10/19/2022 08:30 PM    BLOODU Negative 10/19/2022 08:30 PM    SPECGRAV 1.010 10/19/2022 08:30 PM    GLUCOSEU Negative 10/19/2022 08:30 PM       Radiology:  XR CHEST PORTABLE   Final Result   Interstitial lung disease, which appears similar when compared to the   previous exam.      Small on lateral pneumothorax seen previously is not well visualized   currently. Small bore pleural catheter in place. XR CHEST PORTABLE   Final Result   No significant interval change. Small right lateral pneumothorax. Unchanged bilateral interstitial lung disease. XR CHEST PORTABLE   Final Result   Supportive tubing, including right chest tube are in stable positions. Small right lateral pneumothorax is developed. Lungs are less inflated. Advanced bilateral interstitial lung disease. Suspected interval atelectasis in the lung bases. Developing   pneumonia/pulmonary edema alternatively possible. XR CHEST PORTABLE   Final Result   Near complete resolution of right lateral pneumothorax. XR CHEST PORTABLE   Final Result   1. Smaller RIGHT pneumothorax than previously, lateral measurement 13 mm,   previously 20 mm.   2. Unchanged right pleural catheter positioning. 3. Chronic pulmonary interstitial disease and probable emphysema bilateral   lungs. 4. No new infiltrate evident. 5. No pleural effusion evident. 6. Cardiomegaly. 7.  Calcific atherosclerotic disease aorta. XR CHEST PORTABLE   Final Result   Mildly enlarged right pneumothorax. XR CHEST PORTABLE   Final Result   Small recurrent pneumothorax lateral to the right lower lobe following   clamping of pleural catheter. The findings were sent to the Radiology Results [id number] at 12:19   pm on 12/1/2022 to be communicated to a licensed caregiver. XR CHEST PORTABLE   Final Result   1. No evidence of pneumothorax. 2.  Stable diffuse interstitial changes         XR CHEST PORTABLE   Final Result   Radiographic resolution of right pneumothorax with stable pleural catheter. Diffuse pattern of interstitial change throughout the lungs that is otherwise   stable. XR CHEST PORTABLE   Final Result   1. Significant improvement in the right-sided pneumothorax status post   placement of a pigtail pleural catheter. 2. Unchanged small bore chest tube within the right chest wall soft tissues. 3. Right upper extremity PICC tip projects over SVC. 4. Unchanged left-sided airspace opacities. Stable to mildly increased   right-sided airspace opacities. CT GUIDED PLEURAL DRAINAGE [facility]   Final Result   Successful CT-guided placement of a 15 [place] right-sided pigtail chest tube   as above. Chest tube was connected to pleura vac. CT GUIDED NEEDLE PLACEMENT   Final Result   Successful CT-guided placement of a right-sided 14 [place] pigtail chest tube. XR CHEST PORTABLE    (Results Pending)   XR CHEST PORTABLE    (Results Pending)       IP CONSULT TO CARDIOLOGY  IP CONSULT TO CARDIOTHORACIC SURGERY    Assessment/Plan:    Active Hospital Problems    Diagnosis     Severe malnutrition ([facility] [E43]      Priority: Medium    Pneumothorax [J93.9]      Priority: Medium    Tension pneumothorax [J93.0]      Priority: Medium       Acute on chronic respiratory failure with Hypoxia 2/2 right tension pneumothorax   -Underlying Pulmonary Fibrosis and Cryptogenic Organizing Pneumonia  -Persistent PTX with suspicion for displaced chest tube  -CT Surgery consulted and new chest tube was placed  -Significant improvement with 2nd tube placement  -CXR showed recurrent PTX after chest tube clamping trial.   -Continue chest tube to suction.   -Latest CXR showed re-expansion  -Wean O2 as tolerated  -Plan for Pleurodesis when talc available (allergy to Doxycycline) but cancelled(per pt report, would be painful). Planned for portable atrium on dc and allow lung healing     Chronic diastolic CHF  -Appears compensated  -Medical management. Paroxysmal Afib with RVR - new diagnosis  -Back to NSR  -EP consulted  -TSH stable  -Considering AC pending surgical plans.    -Continue BB; patient/family expressed concerns about using Metoprolol in the past as it has caused chest pain.  Discussed with EP and it would be ok to resume her Nadolol at 20 mg daily if she prefers.    -Planned for Cardiac Monitor at [facility]. Diabetes Mellitus, Type 2: Controlled. Continue SSI. Monitor blood sugar and adjust regimen as needed. Diabetic (Carb-controlled) diet when able. Hypothyroidism  -TSH stable  -continue synthroid     Iron Deficiency Anemia  -Recent  Hemorrhoidal bleed - Colonoscopy negative  -Hb stable  -continue PO iron  -Monitor     Depression/Anxiety  -continue zoloft/remeron  -prn ativan     DVT Prophylaxis: Lovenox  Diet: ADULT DIET;  Regular  ADULT ORAL NUTRITION SUPPLEMENT; Breakfast, Lunch, Dinner; Standard High Calorie/High Protein Oral Supplement  Code Status: Full Code  PT/OT Eval Status: not ordered    Dispo - per surgery, stable resp status, plan for tomorrow    Appropriate for A1 Discharge Unit: No      Edilma Mcclure MD

## 2022-12-07 ENCOUNTER — APPOINTMENT (OUTPATIENT)
Dept: GENERAL RADIOLOGY | Age: 67
DRG: 199 | End: 2022-12-07
Attending: INTERNAL MEDICINE
Payer: MEDICARE

## 2022-12-07 LAB
GLUCOSE BLD-MCNC: 119 MG/DL (ref 70–99)
GLUCOSE BLD-MCNC: 123 MG/DL (ref 70–99)
GLUCOSE BLD-MCNC: 128 MG/DL (ref 70–99)
GLUCOSE BLD-MCNC: 95 MG/DL (ref 70–99)
PERFORMED ON: ABNORMAL
PERFORMED ON: NORMAL

## 2022-12-07 PROCEDURE — 2700000000 HC OXYGEN THERAPY PER DAY

## 2022-12-07 PROCEDURE — 2580000003 HC RX 258: Performed by: INTERNAL MEDICINE

## 2022-12-07 PROCEDURE — 94761 N-INVAS EAR/PLS OXIMETRY MLT: CPT

## 2022-12-07 PROCEDURE — 6360000002 HC RX W HCPCS: Performed by: INTERNAL MEDICINE

## 2022-12-07 PROCEDURE — 71045 X-RAY EXAM CHEST 1 VIEW: CPT

## 2022-12-07 PROCEDURE — 2060000000 HC ICU INTERMEDIATE R&B

## 2022-12-07 PROCEDURE — 6370000000 HC RX 637 (ALT 250 FOR IP): Performed by: INTERNAL MEDICINE

## 2022-12-07 RX ORDER — DILTIAZEM HYDROCHLORIDE 240 MG/1
240 CAPSULE, COATED, EXTENDED RELEASE ORAL DAILY
Qty: 30 CAPSULE | Refills: 2 | Status: SHIPPED | OUTPATIENT
Start: 2022-12-08

## 2022-12-07 RX ADMIN — PANTOPRAZOLE SODIUM 40 MG: 40 TABLET, DELAYED RELEASE ORAL at 08:27

## 2022-12-07 RX ADMIN — DILTIAZEM HYDROCHLORIDE 240 MG: 240 CAPSULE, COATED, EXTENDED RELEASE ORAL at 08:27

## 2022-12-07 RX ADMIN — MORPHINE SULFATE 2 MG: 2 INJECTION, SOLUTION INTRAMUSCULAR; INTRAVENOUS at 03:29

## 2022-12-07 RX ADMIN — ENOXAPARIN SODIUM 30 MG: 100 INJECTION SUBCUTANEOUS at 08:27

## 2022-12-07 RX ADMIN — PIRFENIDONE 267 MG: 267 TABLET, FILM COATED ORAL at 08:27

## 2022-12-07 RX ADMIN — MIRTAZAPINE 30 MG: 15 TABLET, FILM COATED ORAL at 20:31

## 2022-12-07 RX ADMIN — ATORVASTATIN CALCIUM 40 MG: 40 TABLET, FILM COATED ORAL at 20:31

## 2022-12-07 RX ADMIN — SODIUM CHLORIDE, PRESERVATIVE FREE 10 ML: 5 INJECTION INTRAVENOUS at 08:27

## 2022-12-07 RX ADMIN — PIRFENIDONE 267 MG: 267 TABLET, FILM COATED ORAL at 15:08

## 2022-12-07 RX ADMIN — SODIUM CHLORIDE, PRESERVATIVE FREE 10 ML: 5 INJECTION INTRAVENOUS at 20:33

## 2022-12-07 RX ADMIN — MORPHINE SULFATE 2 MG: 2 INJECTION, SOLUTION INTRAMUSCULAR; INTRAVENOUS at 20:32

## 2022-12-07 RX ADMIN — LEVOTHYROXINE SODIUM 125 MCG: 0.12 TABLET ORAL at 06:42

## 2022-12-07 RX ADMIN — NADOLOL 20 MG: 20 TABLET ORAL at 08:27

## 2022-12-07 RX ADMIN — ACETAMINOPHEN 650 MG: 325 TABLET, FILM COATED ORAL at 18:48

## 2022-12-07 RX ADMIN — SERTRALINE HYDROCHLORIDE 200 MG: 50 TABLET ORAL at 08:27

## 2022-12-07 ASSESSMENT — ENCOUNTER SYMPTOMS: TACHYPNEA: 1

## 2022-12-07 ASSESSMENT — PAIN SCALES - GENERAL
PAINLEVEL_OUTOF10: 0
PAINLEVEL_OUTOF10: 9
PAINLEVEL_OUTOF10: 0
PAINLEVEL_OUTOF10: 8

## 2022-12-07 ASSESSMENT — PAIN DESCRIPTION - LOCATION
LOCATION: BACK
LOCATION: BACK

## 2022-12-07 NOTE — PROGRESS NOTES
Pt currently wearing 4L nasal canula per baseline oxygen at home. Pt O2 sats 99% at rest. Pt stood and pivoted to [facility] with assist of 1 person. Pt O2 dropped to 83% on her baseline 4L Nasal canula. Pt O2 increased to 7L to recover patient. After being at rest in the bed for approximately 10-15 minutes, this RN was able to turn O2 back down to 4L nasal canula. Pt has significant oxygen demand increase with any exertion.

## 2022-12-07 NOTE — CARE COORDINATION
12/7/22 Plan is home with [facility]. Pt has home o2 with [facility], has non-skilled services with [employer]. Pt will dc home with Pneumostat, spoke to CT NP and she will write orders for the pneumostat care on the [address] for [facility].

## 2022-12-07 NOTE — PROGRESS NOTES
Hospitalist Progress Note      PCP: Ulices Bowie MD    Date of Admission: 11/28/2022    Chief Complaint: SOB     Hospital Course:   69 y.o. female who presented to [facility] with SOB. PMHx significant for Chronic Hypoxic Respiratory Failure, Pulmonary Fibrosis, Cryptogenic Organizing Pneumonia. Initially presented to hospitals and admitted to [facility] for Tension Pneumothorax. Chest tube was placed. Transferred to [facility] for surgical evaluation. Reports increasing SOB the afternoon of transfer. No chest pain. O2 requirement increased to 10L (baseline 3-4L).        Subjective:  resting in bed, no sob/cp complaints, portable atrium/CTube in place , on 7L oxygen,  not currently at bedside         Medications:  Reviewed    Infusion Medications    dextrose      sodium chloride       Scheduled Medications    dilTIAZem  240 mg Oral Daily    Pirfenidone  267 mg Oral TID    nadolol  20 mg Oral Daily    atorvastatin  40 mg Oral Daily    levothyroxine  125 mcg Oral Daily    mirtazapine  30 mg Oral Nightly    pantoprazole  40 mg Oral QAM AC    sertraline  200 mg Oral Daily    sodium chloride flush  5-40 mL IntraVENous 2 times per day    insulin lispro  0-8 Units SubCUTAneous TID WC    insulin lispro  0-4 Units SubCUTAneous Nightly    enoxaparin  30 mg SubCUTAneous Daily     PRN Meds: guaiFENesin-dextromethorphan, hydrOXYzine HCl, LORazepam, morphine, metoprolol, traMADol, albuterol sulfate HFA, albuterol, glucose, dextrose bolus **OR** dextrose bolus, glucagon (rDNA), dextrose, sodium chloride flush, sodium chloride, ondansetron **OR** ondansetron, polyethylene glycol, acetaminophen **OR** acetaminophen, benzonatate      Intake/Output Summary (Last 24 hours) at 12/7/2022 0853  Last data filed at 12/7/2022 0545  Gross per 24 hour   Intake 120 ml   Output 800 ml   Net -680 ml       Physical Exam Performed:    BP (!) 143/72   Pulse (!) 101   Temp 98.1 °F (36.7 °C) (Oral)   Resp 20   Ht 5' 3\" (1.6 m)   Wt 105 lb 14.4 oz (48 kg)   SpO2 98%   BMI 18.76 kg/m²     General appearance:  No apparent distress, appears stated age and cooperative. HEENT:  Pupils equal, round, and reactive to light. Conjunctivae/corneas clear. Neck:  Supple, no jugular venous distention. Trachea midline with full range of motion. Respiratory:  Normal respiratory effort. Diminished on right > left. No wheezing. Some crepitus noted  Cardiovascular:  Regular rate and rhythm with normal S1/S2 without murmurs, rubs or gallops. Abdomen:  Soft, non-tender, non-distended with normal bowel sounds. Musculoskelatal:  No clubbing, cyanosis or edema bilaterally. Full range of motion without deformity. Neurologic:  Neurovascularly intact without any focal sensory/motor deficits. Cranial nerves: II-XII intact, grossly non-focal.  Psychiatric:  Alert and oriented, thought content appropriate, normal insight  Skin:  Skin color, texture, turgor normal.  No rashes or lesions. Capillary Refill:  Brisk,< 3 seconds   Peripheral Pulses:  +2 palpable, equal bilaterally          Labs:   No results for input(s): WBC, HGB, HCT, PLT in the last 72 hours. No results for input(s): NA, K, CL, CO2, BUN, CREATININE, CALCIUM, PHOS in the last 72 hours. Invalid input(s): MAGNES  No results for input(s): AST, ALT, BILIDIR, BILITOT, ALKPHOS in the last 72 hours. No results for input(s): INR in the last 72 hours. No results for input(s): Sonia Earnest in the last 72 hours. Urinalysis:      Lab Results   Component Value Date/Time    NITRU Negative 10/19/2022 08:30 PM    WBCUA None seen 10/19/2022 08:30 PM    BACTERIA Rare 05/28/2022 06:39 AM    RBCUA None seen 10/19/2022 08:30 PM    BLOODU Negative 10/19/2022 08:30 PM    SPECGRAV 1.010 10/19/2022 08:30 PM    GLUCOSEU Negative 10/19/2022 08:30 PM       Radiology:  XR CHEST PORTABLE   Final Result   Pulmonary fibrosis. No definite pneumothorax.   No significant change         XR CHEST PORTABLE   Final Result   No recurrent pneumothorax. Chronic pattern of interstitial lung disease,   stable. XR CHEST PORTABLE   Final Result   Interstitial lung disease, which appears similar when compared to the   previous exam.      Small on lateral pneumothorax seen previously is not well visualized   currently. Small bore pleural catheter in place. XR CHEST PORTABLE   Final Result   No significant interval change. Small right lateral pneumothorax. Unchanged bilateral interstitial lung disease. XR CHEST PORTABLE   Final Result   Supportive tubing, including right chest tube are in stable positions. Small right lateral pneumothorax is developed. Lungs are less inflated. Advanced bilateral interstitial lung disease. Suspected interval atelectasis in the lung bases. Developing   pneumonia/pulmonary edema alternatively possible. XR CHEST PORTABLE   Final Result   Near complete resolution of right lateral pneumothorax. XR CHEST PORTABLE   Final Result   1. Smaller RIGHT pneumothorax than previously, lateral measurement 13 mm,   previously 20 mm.   2. Unchanged right pleural catheter positioning. 3. Chronic pulmonary interstitial disease and probable emphysema bilateral   lungs. 4. No new infiltrate evident. 5. No pleural effusion evident. 6. Cardiomegaly. 7.  Calcific atherosclerotic disease aorta. XR CHEST PORTABLE   Final Result   Mildly enlarged right pneumothorax. XR CHEST PORTABLE   Final Result   Small recurrent pneumothorax lateral to the right lower lobe following   clamping of pleural catheter. The findings were sent to the Radiology Results [id number] at 12:19   pm on 12/1/2022 to be communicated to a licensed caregiver. XR CHEST PORTABLE   Final Result   1. No evidence of pneumothorax.       2.  Stable diffuse interstitial changes         XR CHEST PORTABLE   Final Result   Radiographic resolution of right pneumothorax with stable pleural catheter. Diffuse pattern of interstitial change throughout the lungs that is otherwise   stable. XR CHEST PORTABLE   Final Result   1. Significant improvement in the right-sided pneumothorax status post   placement of a pigtail pleural catheter. 2. Unchanged small bore chest tube within the right chest wall soft tissues. 3. Right upper extremity PICC tip projects over SVC. 4. Unchanged left-sided airspace opacities. Stable to mildly increased   right-sided airspace opacities. CT GUIDED PLEURAL DRAINAGE [facility]   Final Result   Successful CT-guided placement of a 15 [place] Island[place] right-sided pigtail chest tube   as above. Chest tube was connected to pleura vac. CT GUIDED NEEDLE PLACEMENT   Final Result   Successful CT-guided placement of a right-sided 14 [place] Islander pigtail chest tube. XR CHEST STANDARD (2 VW)    (Results Pending)       IP CONSULT TO CARDIOLOGY  IP CONSULT TO CARDIOTHORACIC SURGERY    Assessment/Plan:    Active Hospital Problems    Diagnosis     Severe malnutrition ([facility] [address] [E43]      Priority: Medium    Pneumothorax [J93.9]      Priority: Medium    Tension pneumothorax [J93.0]      Priority: Medium       Acute on chronic respiratory failure with Hypoxia 2/2 right tension pneumothorax   -Underlying Pulmonary Fibrosis and Cryptogenic Organizing Pneumonia  -Persistent PTX with suspicion for displaced chest tube  -CT Surgery consulted and new chest tube was placed  -Significant improvement with 2nd tube placement  -CXR showed recurrent PTX after chest tube clamping trial.   -Continue chest tube to suction.   -Latest CXR showed re-expansion  -Wean O2 as tolerated  -Plan for Pleurodesis when talc available (allergy to Doxycycline) but cancelled(per pt report, would be painful). Planned for portable atrium on dc and allow lung healing     Chronic diastolic CHF  -Appears compensated  -Medical management.       Paroxysmal Afib with RVR - new diagnosis  -Back to NSR  -EP consulted  -TSH stable  -Considering AC pending surgical plans.    -Continue BB; patient/family expressed concerns about using Metoprolol in the past as it has caused chest pain. Discussed with EP and it would be ok to resume her Nadolol at 20 mg daily if she prefers.    -Planned for Cardiac Monitor at [place]. Diabetes Mellitus, Type 2: Controlled. Continue SSI. Monitor blood sugar and adjust regimen as needed. Diabetic (Carb-controlled) diet when able. Hypothyroidism  -TSH stable  -continue synthroid     Iron Deficiency Anemia  -Recent  Hemorrhoidal bleed - Colonoscopy negative  -Hb stable  -continue PO iron  -Monitor     Depression/Anxiety  -continue zoloft/remeron  -prn ativan     DVT Prophylaxis: Lovenox  Diet: ADULT DIET;  Regular  ADULT ORAL NUTRITION SUPPLEMENT; Breakfast, Lunch, Dinner; Standard High Calorie/High Protein Oral Supplement  Code Status: Full Code    PT/OT Eval Status: not ordered     Dispo - per surgery, stable resp status, wean down oxygen to home 4L at rest if pt tolerates, plan for tomorrow    Appropriate for A1 Discharge Unit: Hortencia Robbins MD

## 2022-12-08 ENCOUNTER — APPOINTMENT (OUTPATIENT)
Dept: GENERAL RADIOLOGY | Age: 67
DRG: 199 | End: 2022-12-08
Attending: INTERNAL MEDICINE
Payer: MEDICARE

## 2022-12-08 LAB
ALBUMIN SERPL-MCNC: 3.1 G/DL (ref 3.4–5)
ANION GAP SERPL CALCULATED.3IONS-SCNC: 4 MMOL/L (ref 3–16)
BASOPHILS ABSOLUTE: 0.1 K/UL (ref 0–0.2)
BASOPHILS RELATIVE PERCENT: 0.7 %
BUN BLDV-MCNC: 10 MG/DL (ref 7–20)
CALCIUM SERPL-MCNC: 8.7 MG/DL (ref 8.3–10.6)
CHLORIDE BLD-SCNC: 99 MMOL/L (ref 99–110)
CO2: 36 MMOL/L (ref 21–32)
CREAT SERPL-MCNC: <0.5 MG/DL (ref 0.6–1.2)
EOSINOPHILS ABSOLUTE: 0.7 K/UL (ref 0–0.6)
EOSINOPHILS RELATIVE PERCENT: 8.4 %
GFR SERPL CREATININE-BSD FRML MDRD: >60 ML/MIN/{1.73_M2}
GLUCOSE BLD-MCNC: 113 MG/DL (ref 70–99)
GLUCOSE BLD-MCNC: 114 MG/DL (ref 70–99)
GLUCOSE BLD-MCNC: 125 MG/DL (ref 70–99)
GLUCOSE BLD-MCNC: 125 MG/DL (ref 70–99)
GLUCOSE BLD-MCNC: 97 MG/DL (ref 70–99)
HCT VFR BLD CALC: 29.2 % (ref 36–48)
HEMOGLOBIN: 9.1 G/DL (ref 12–16)
LYMPHOCYTES ABSOLUTE: 1.3 K/UL (ref 1–5.1)
LYMPHOCYTES RELATIVE PERCENT: 16 %
MAGNESIUM: 1.8 MG/DL (ref 1.8–2.4)
MCH RBC QN AUTO: 25.1 PG (ref 26–34)
MCHC RBC AUTO-ENTMCNC: 31.1 G/DL (ref 31–36)
MCV RBC AUTO: 80.6 FL (ref 80–100)
MONOCYTES ABSOLUTE: 0.5 K/UL (ref 0–1.3)
MONOCYTES RELATIVE PERCENT: 6.5 %
NEUTROPHILS ABSOLUTE: 5.6 K/UL (ref 1.7–7.7)
NEUTROPHILS RELATIVE PERCENT: 68.4 %
PDW BLD-RTO: 19.4 % (ref 12.4–15.4)
PERFORMED ON: ABNORMAL
PERFORMED ON: NORMAL
PHOSPHORUS: 4.6 MG/DL (ref 2.5–4.9)
PLATELET # BLD: 254 K/UL (ref 135–450)
PMV BLD AUTO: 7.1 FL (ref 5–10.5)
POTASSIUM SERPL-SCNC: 4.1 MMOL/L (ref 3.5–5.1)
PRO-BNP: 430 PG/ML (ref 0–124)
RBC # BLD: 3.62 M/UL (ref 4–5.2)
SODIUM BLD-SCNC: 139 MMOL/L (ref 136–145)
WBC # BLD: 8.1 K/UL (ref 4–11)

## 2022-12-08 PROCEDURE — 83735 ASSAY OF MAGNESIUM: CPT

## 2022-12-08 PROCEDURE — 83880 ASSAY OF NATRIURETIC PEPTIDE: CPT

## 2022-12-08 PROCEDURE — 71046 X-RAY EXAM CHEST 2 VIEWS: CPT

## 2022-12-08 PROCEDURE — 6360000002 HC RX W HCPCS: Performed by: INTERNAL MEDICINE

## 2022-12-08 PROCEDURE — 80069 RENAL FUNCTION PANEL: CPT

## 2022-12-08 PROCEDURE — 85025 COMPLETE CBC W/AUTO DIFF WBC: CPT

## 2022-12-08 PROCEDURE — 2060000000 HC ICU INTERMEDIATE R&B

## 2022-12-08 PROCEDURE — 2580000003 HC RX 258: Performed by: INTERNAL MEDICINE

## 2022-12-08 PROCEDURE — 6370000000 HC RX 637 (ALT 250 FOR IP): Performed by: INTERNAL MEDICINE

## 2022-12-08 RX ADMIN — SERTRALINE HYDROCHLORIDE 200 MG: 50 TABLET ORAL at 09:37

## 2022-12-08 RX ADMIN — MORPHINE SULFATE 2 MG: 2 INJECTION, SOLUTION INTRAMUSCULAR; INTRAVENOUS at 03:00

## 2022-12-08 RX ADMIN — LEVOTHYROXINE SODIUM 125 MCG: 0.12 TABLET ORAL at 06:17

## 2022-12-08 RX ADMIN — NADOLOL 20 MG: 20 TABLET ORAL at 09:37

## 2022-12-08 RX ADMIN — PIRFENIDONE 267 MG: 267 TABLET, FILM COATED ORAL at 15:58

## 2022-12-08 RX ADMIN — ACETAMINOPHEN 650 MG: 325 TABLET, FILM COATED ORAL at 16:51

## 2022-12-08 RX ADMIN — ATORVASTATIN CALCIUM 40 MG: 40 TABLET, FILM COATED ORAL at 20:50

## 2022-12-08 RX ADMIN — PANTOPRAZOLE SODIUM 40 MG: 40 TABLET, DELAYED RELEASE ORAL at 09:37

## 2022-12-08 RX ADMIN — SODIUM CHLORIDE, PRESERVATIVE FREE 10 ML: 5 INJECTION INTRAVENOUS at 09:37

## 2022-12-08 RX ADMIN — ENOXAPARIN SODIUM 30 MG: 100 INJECTION SUBCUTANEOUS at 09:37

## 2022-12-08 RX ADMIN — MIRTAZAPINE 30 MG: 15 TABLET, FILM COATED ORAL at 20:50

## 2022-12-08 RX ADMIN — PIRFENIDONE 267 MG: 267 TABLET, FILM COATED ORAL at 09:37

## 2022-12-08 RX ADMIN — DILTIAZEM HYDROCHLORIDE 240 MG: 240 CAPSULE, COATED, EXTENDED RELEASE ORAL at 09:37

## 2022-12-08 ASSESSMENT — PAIN DESCRIPTION - LOCATION: LOCATION: BACK

## 2022-12-08 ASSESSMENT — PAIN SCALES - GENERAL: PAINLEVEL_OUTOF10: 8

## 2022-12-08 NOTE — PROGRESS NOTES
Hospitalist Progress Note      PCP: Priti Winkler MD    Date of Admission: 11/28/2022    Chief Complaint: SOB     Hospital Course:   69 y.o. female who presented to [facility] with SOB. PMHx significant for Chronic Hypoxic Respiratory Failure, Pulmonary Fibrosis, Cryptogenic Organizing Pneumonia. Initially presented to [facility] and admitted to [facility] for Tension Pneumothorax. Chest tube was placed. Transferred to [facility] for surgical evaluation. Reports increasing SOB the afternoon of transfer. No chest pain. O2 requirement increased to 10L (baseline 3-4L).        Subjective:  resting in bed, no sob/cp complaints, portable atrium/CTube in place , back on 7L oxygen,  currently at bedside      Medications:  Reviewed    Infusion Medications    dextrose      sodium chloride       Scheduled Medications    dilTIAZem  240 mg Oral Daily    Pirfenidone  267 mg Oral TID    nadolol  20 mg Oral Daily    atorvastatin  40 mg Oral Daily    levothyroxine  125 mcg Oral Daily    mirtazapine  30 mg Oral Nightly    pantoprazole  40 mg Oral QAM AC    sertraline  200 mg Oral Daily    sodium chloride flush  5-40 mL IntraVENous 2 times per day    insulin lispro  0-8 Units SubCUTAneous TID WC    insulin lispro  0-4 Units SubCUTAneous Nightly    enoxaparin  30 mg SubCUTAneous Daily     PRN Meds: guaiFENesin-dextromethorphan, hydrOXYzine HCl, LORazepam, morphine, metoprolol, traMADol, albuterol sulfate HFA, albuterol, glucose, dextrose bolus **OR** dextrose bolus, glucagon (rDNA), dextrose, sodium chloride flush, sodium chloride, ondansetron **OR** ondansetron, polyethylene glycol, acetaminophen **OR** acetaminophen, benzonatate      Intake/Output Summary (Last 24 hours) at 12/8/2022 1523  Last data filed at 12/7/2022 2057  Gross per 24 hour   Intake 0 ml   Output 0 ml   Net 0 ml       Physical Exam Performed:    /75   Pulse 91   Temp 97.8 °F (36.6 °C) (Oral)   Resp 24   Ht 5' 3\" (1.6 m)   Wt 105 lb 4.8 oz (47.8 kg) SpO2 97%   BMI 18.65 kg/m²   General appearance:  No apparent distress, appears stated age and cooperative. Frail/thin appearing  HEENT:  Pupils equal, round, and reactive to light. Conjunctivae/corneas clear. Neck:  Supple, no jugular venous distention. Trachea midline with full range of motion. Respiratory:  Normal respiratory effort. Diminished on right > left. No wheezing. mild crepitus noted  Cardiovascular:  Regular rate and rhythm with normal S1/S2 without murmurs, rubs or gallops. Abdomen:  Soft, non-tender, non-distended with normal bowel sounds. Musculoskelatal:  No clubbing, cyanosis or edema bilaterally. Full range of motion without deformity. Neurologic:  Neurovascularly intact without any focal sensory/motor deficits. Cranial nerves: II-XII intact, grossly non-focal.  Psychiatric:  Alert and oriented, thought content appropriate, normal insight  Skin:  Skin color, texture, turgor normal.  No rashes or lesions. Capillary Refill:  Brisk,< 3 seconds   Peripheral Pulses:  +2 palpable, equal bilaterally       Labs:   Recent Labs     12/08/22  1135   WBC 8.1   HGB 9.1*   HCT 29.2*        Recent Labs     12/08/22  1135      K 4.1   CL 99   CO2 36*   BUN 10   CREATININE <0.5*   CALCIUM 8.7   PHOS 4.6     No results for input(s): AST, ALT, BILIDIR, BILITOT, ALKPHOS in the last 72 hours. No results for input(s): INR in the last 72 hours. No results for input(s): Ella Macleod in the last 72 hours. Urinalysis:      Lab Results   Component Value Date/Time    NITRU Negative 10/19/2022 08:30 PM    WBCUA None seen 10/19/2022 08:30 PM    BACTERIA Rare 05/28/2022 06:39 AM    RBCUA None seen 10/19/2022 08:30 PM    BLOODU Negative 10/19/2022 08:30 PM    SPECGRAV 1.010 10/19/2022 08:30 PM    GLUCOSEU Negative 10/19/2022 08:30 PM       Radiology:  XR CHEST (2 VW)   Final Result   Worsening CHF/pulmonary edema. XR CHEST PORTABLE   Final Result   Pulmonary fibrosis.   No definite pneumothorax. No significant change         XR CHEST PORTABLE   Final Result   No recurrent pneumothorax. Chronic pattern of interstitial lung disease,   stable. XR CHEST PORTABLE   Final Result   Interstitial lung disease, which appears similar when compared to the   previous exam.      Small on lateral pneumothorax seen previously is not well visualized   currently. Small bore pleural catheter in place. XR CHEST PORTABLE   Final Result   No significant interval change. Small right lateral pneumothorax. Unchanged bilateral interstitial lung disease. XR CHEST PORTABLE   Final Result   Supportive tubing, including right chest tube are in stable positions. Small right lateral pneumothorax is developed. Lungs are less inflated. Advanced bilateral interstitial lung disease. Suspected interval atelectasis in the lung bases. Developing   pneumonia/pulmonary edema alternatively possible. XR CHEST PORTABLE   Final Result   Near complete resolution of right lateral pneumothorax. XR CHEST PORTABLE   Final Result   1. Smaller RIGHT pneumothorax than previously, lateral measurement 13 mm,   previously 20 mm.   2. Unchanged right pleural catheter positioning. 3. Chronic pulmonary interstitial disease and probable emphysema bilateral   lungs. 4. No new infiltrate evident. 5. No pleural effusion evident. 6. Cardiomegaly. 7.  Calcific atherosclerotic disease aorta. XR CHEST PORTABLE   Final Result   Mildly enlarged right pneumothorax. XR CHEST PORTABLE   Final Result   Small recurrent pneumothorax lateral to the right lower lobe following   clamping of pleural catheter. The findings were sent to the Radiology Results [id number] at 12:19   pm on 12/1/2022 to be communicated to a licensed caregiver. XR CHEST PORTABLE   Final Result   1. No evidence of pneumothorax.       2.  Stable diffuse interstitial changes         XR CHEST PORTABLE   Final Result   Radiographic resolution of right pneumothorax with stable pleural catheter. Diffuse pattern of interstitial change throughout the lungs that is otherwise   stable. XR CHEST PORTABLE   Final Result   1. Significant improvement in the right-sided pneumothorax status post   placement of a pigtail pleural catheter. 2. Unchanged small bore chest tube within the right chest wall soft tissues. 3. Right upper extremity PICC tip projects over SVC. 4. Unchanged left-sided airspace opacities. Stable to mildly increased   right-sided airspace opacities. CT GUIDED PLEURAL DRAINAGE [facility]   Final Result   Successful CT-guided placement of a 15 [place]ian right-sided pigtail chest tube   as above. Chest tube was connected to pleura vac. CT GUIDED NEEDLE PLACEMENT   Final Result   Successful CT-guided placement of a right-sided 14 Vincentian pigtail chest tube. XR CHEST STANDARD (2 VW)    (Results Pending)       IP CONSULT TO CARDIOLOGY  IP CONSULT TO CARDIOTHORACIC SURGERY  IP CONSULT TO HOME CARE NEEDS    Assessment/Plan:    Active Hospital Problems    Diagnosis     Severe malnutrition ([address] [E43]      Priority: Medium    Pneumothorax [J93.9]      Priority: Medium    Tension pneumothorax [J93.0]      Priority: Medium     Acute on chronic respiratory failure with Hypoxia 2/2 right tension pneumothorax   -Underlying Pulmonary Fibrosis and Cryptogenic Organizing Pneumonia, at home pt uses 4L at rest and 8L with exertion  -Persistent PTX with suspicion for displaced chest tube  -CT Surgery consulted and new chest tube was placed  -Significant improvement with 2nd tube placement  -CXR showed recurrent PTX after chest tube clamping trial.   -Continued chest tube to suction.   -Latest CXR showed re-expansion  -Wean O2 as tolerated  -Planned for Pleurodesis when talc available (allergy to Doxycycline) but cancelled(per pt report, would be painful).  Planned for portable atrium on dc and allow lung healing   -repeat cxr 12/8 noted possible pulm edema, but BNP better from prior, exam w/o rales/wheezing    Chronic diastolic CHF  -Appears compensated  -Medical management. Paroxysmal Afib with RVR - new diagnosis  -Back to NSR  -EP consulted  -TSH stable  -Considering AC pending surgical plans.    -Continue BB; patient/family expressed concerns about using Metoprolol in the past as it has caused chest pain. Discussed with EP and it would be ok to resume her Nadolol at 20 mg daily if she prefers.    -Planned for Cardiac Monitor at [place]. Diabetes Mellitus, Type 2: Controlled. Continue SSI. Monitor blood sugar and adjust regimen as needed. Diabetic (Carb-controlled) diet when able. Hypothyroidism  -TSH stable  -continue synthroid     Iron Deficiency Anemia  -Recent  Hemorrhoidal bleed - Colonoscopy negative  -Hb stable  -continue PO iron  -Monitor     Depression/Anxiety  -continue zoloft/remeron  -prn ativan    DVT Prophylaxis: lovenox  Diet: ADULT DIET;  Regular  ADULT ORAL NUTRITION SUPPLEMENT; Breakfast, Lunch, Dinner; Standard High Calorie/High Protein Oral Supplement  Code Status: Full Code  PT/OT Eval Status: not ordered    Dispo - medically stable for dc as of 12/8, family to pick pt up on 12/9 AM    Appropriate for A1 Discharge Unit: No      Nikki Castellon MD

## 2022-12-09 VITALS
WEIGHT: 104.8 LBS | HEART RATE: 86 BPM | HEIGHT: 63 IN | TEMPERATURE: 98.1 F | SYSTOLIC BLOOD PRESSURE: 109 MMHG | OXYGEN SATURATION: 98 % | BODY MASS INDEX: 18.57 KG/M2 | DIASTOLIC BLOOD PRESSURE: 70 MMHG | RESPIRATION RATE: 20 BRPM

## 2022-12-09 LAB
GLUCOSE BLD-MCNC: 116 MG/DL (ref 70–99)
PERFORMED ON: ABNORMAL

## 2022-12-09 PROCEDURE — 6370000000 HC RX 637 (ALT 250 FOR IP): Performed by: INTERNAL MEDICINE

## 2022-12-09 PROCEDURE — 2700000000 HC OXYGEN THERAPY PER DAY

## 2022-12-09 PROCEDURE — 94761 N-INVAS EAR/PLS OXIMETRY MLT: CPT

## 2022-12-09 PROCEDURE — 6360000002 HC RX W HCPCS: Performed by: INTERNAL MEDICINE

## 2022-12-09 PROCEDURE — 2580000003 HC RX 258: Performed by: INTERNAL MEDICINE

## 2022-12-09 RX ADMIN — ENOXAPARIN SODIUM 30 MG: 100 INJECTION SUBCUTANEOUS at 08:50

## 2022-12-09 RX ADMIN — DILTIAZEM HYDROCHLORIDE 240 MG: 240 CAPSULE, COATED, EXTENDED RELEASE ORAL at 08:50

## 2022-12-09 RX ADMIN — NADOLOL 20 MG: 20 TABLET ORAL at 08:50

## 2022-12-09 RX ADMIN — SERTRALINE HYDROCHLORIDE 200 MG: 50 TABLET ORAL at 08:50

## 2022-12-09 RX ADMIN — LEVOTHYROXINE SODIUM 125 MCG: 0.12 TABLET ORAL at 06:18

## 2022-12-09 RX ADMIN — SODIUM CHLORIDE, PRESERVATIVE FREE 10 ML: 5 INJECTION INTRAVENOUS at 08:50

## 2022-12-09 RX ADMIN — ONDANSETRON 4 MG: 2 INJECTION INTRAMUSCULAR; INTRAVENOUS at 08:56

## 2022-12-09 RX ADMIN — PANTOPRAZOLE SODIUM 40 MG: 40 TABLET, DELAYED RELEASE ORAL at 08:50

## 2022-12-09 RX ADMIN — ACETAMINOPHEN 650 MG: 325 TABLET, FILM COATED ORAL at 01:01

## 2022-12-09 ASSESSMENT — PAIN DESCRIPTION - LOCATION: LOCATION: BACK

## 2022-12-09 ASSESSMENT — PAIN SCALES - GENERAL: PAINLEVEL_OUTOF10: 3

## 2022-12-09 NOTE — DISCHARGE INSTR - DIET

## 2022-12-09 NOTE — CARE COORDINATION
CASE MANAGEMENT DISCHARGE SUMMARY      Discharge to: home with spouse and [facility]. IMM given: 12/9/22    New Durable Medical Equipment ordered/agency: [facility] for home O2.  bringing tank    Transportation: private vehicle       Confirmed discharge plan with:     Patient: yes   Family:  yes-  aware and will be here around 10 am.      Facility/Agency, name:  [facility] faxed to [phone] for [facility]       This RN CM received phone call from [facility] that they are not able to manage the pneumostat. Previous CM notes state that per nursing the Pt will not need HHC to manage the pneumostat, however the Pt has chest tube care instructions in chart that read:   Change the chest tube dressing every other day and PRN. Drain the Pneumostat daily and PRN; record drainage. GISELLA Streeter with [facility] confirmed that the pt will need chest tube care over the weekend. Call placed to [facility] care and referral made. Mustapha Mathis with [facility] to discharge the pt so [facility] agency can complete prior auth. Shannon with [facility] has Luis's contact info for any questions. All docs faxed to [phone]. Coco Lr with [facility] [place] [facility] able to accept and see the pt on Sunday for chest tube dressing change. Pt's spouse, [facility] and also Dtr, [facility] notified and aware. [facility] to  syringes from [facility]. [facility] that are needed for draining the chest tube. [facility]. Or[facility] to place them in a bag for her to . Weekend CM number given to [facility] should any questions arise over weekend.

## 2022-12-09 NOTE — PROGRESS NOTES
Pt discharged. After visit summary and medications reviewed with pt and pt  at bedside. No further questions at this time. IV and Tele discontinued. CMU made aware. All of belongings sent home with pt. Pt wheeled down in wheelchair by RN.

## 2022-12-09 NOTE — DISCHARGE SUMMARY
Hospital Medicine Discharge Summary    Patient ID: Sayra Rice      Patient's PCP: Ulices Bowie MD    Admit Date: 11/28/2022     Discharge Date: 12/9/2022      Admitting Provider: No admitting provider for patient encounter. Discharge Provider: Erwin Campbell MD     Discharge Diagnoses: Active Hospital Problems    Diagnosis     Severe malnutrition ([address] [E43]      Priority: Medium    Pneumothorax [J93.9]      Priority: Medium    Tension pneumothorax [J93.0]      Priority: Medium       The patient was seen and examined on day of discharge and this discharge summary is in conjunction with any daily progress note from day of discharge. Hospital Course:   History Of Present Illness:   69 y.o. female who presented to [facility] with SOB. PMHx significant for Chronic Hypoxic Respiratory Failure, Pulmonary Fibrosis, Cryptogenic Organizing Pneumonia. Initially presented to [address] and admitted to [facility] for Tension Pneumothorax. Chest tube was placed. Transferred to [facility] for surgical evaluation. Reports increasing SOB the afternoon of transfer. No chest pain. O2 requirement increased to 10L (baseline 3-4L). Acute on chronic respiratory failure with Hypoxia 2/2 right tension pneumothorax   -Underlying Pulmonary Fibrosis and Cryptogenic Organizing Pneumonia, at home pt uses 4L at rest and 8L with exertion  -Persistent PTX with suspicion for displaced chest tube  -CT Surgery consulted and new chest tube was placed  -Significant improvement with 2nd tube placement  -CXR showed recurrent PTX after chest tube clamping trial.   -Continued chest tube to suction.   -Latest CXR showed re-expansion  -Wean O2 as tolerated  -Planned for Pleurodesis when talc available (allergy to Doxycycline) but cancelled(per pt report, would be painful).  Planned for portable atrium on dc and allow lung healing   -repeat cxr 12/8 noted possible pulm edema, but BNP better from prior, exam w/o rales/wheezing Chronic diastolic CHF  -Appears compensated  -Medical management. Paroxysmal Afib with RVR - new diagnosis  -Back to NSR  -EP consulted  -TSH stable  -Considering AC pending surgical plans.    -Continue BB; patient/family expressed concerns about using Metoprolol in the past as it has caused chest pain. Discussed with EP and it would be ok to resume her Nadolol at 20 mg daily if she prefers.    -Planned for Cardiac Monitor at [place]. Diabetes Mellitus, Type 2: Controlled. Continue SSI. Monitor blood sugar and adjust regimen as needed. Diabetic (Carb-controlled) diet when able. Hypothyroidism  -TSH stable  -continued synthroid     Iron Deficiency Anemia  -Recent  Hemorrhoidal bleed - Colonoscopy negative  -Hb stable  -continued PO iron  -Monitored     Depression/Anxiety  -continued zoloft/remeron  -prn ativan      Physical Exam Performed:     /70   Pulse 86   Temp 98.1 °F (36.7 °C) (Oral)   Resp 20   Ht 5' 3\" (1.6 m)   Wt 104 lb 12.8 oz (47.5 kg)   SpO2 98%   BMI 18.56 kg/m²     General appearance:  No apparent distress, appears stated age and cooperative. Frail/thin appearing  HEENT:  Pupils equal, round, and reactive to light. Conjunctivae/corneas clear. Neck:  Supple, no jugular venous distention. Trachea midline with full range of motion. Respiratory:  Normal respiratory effort. Diminished on right > left. No wheezing. mild crepitus noted  Cardiovascular:  Regular rate and rhythm with normal S1/S2 without murmurs, rubs or gallops. Abdomen:  Soft, non-tender, non-distended with normal bowel sounds. Musculoskelatal:  No clubbing, cyanosis or edema bilaterally. Full range of motion without deformity. Neurologic:  Neurovascularly intact without any focal sensory/motor deficits. Cranial nerves: II-XII intact, grossly non-focal.  Psychiatric:  Alert and oriented, thought content appropriate, normal insight  Skin:  Skin color, texture, turgor normal.  No rashes or lesions.   Capillary Refill:  Brisk,< 3 seconds   Peripheral Pulses:  +2 palpable, equal bilaterally       Labs: For convenience and continuity at follow-up the following most recent labs are provided:      CBC:    Lab Results   Component Value Date/Time    WBC 8.1 12/08/2022 11:35 AM    HGB 9.1 12/08/2022 11:35 AM    HCT 29.2 12/08/2022 11:35 AM     12/08/2022 11:35 AM       Renal:    Lab Results   Component Value Date/Time     12/08/2022 11:35 AM    K 4.1 12/08/2022 11:35 AM    K 4.3 12/03/2022 05:45 AM    CL 99 12/08/2022 11:35 AM    CO2 36 12/08/2022 11:35 AM    BUN 10 12/08/2022 11:35 AM    CREATININE <0.5 12/08/2022 11:35 AM    CALCIUM 8.7 12/08/2022 11:35 AM    PHOS 4.6 12/08/2022 11:35 AM         Significant Diagnostic Studies    Radiology:   XR CHEST (2 VW)   Final Result   Worsening CHF/pulmonary edema. XR CHEST PORTABLE   Final Result   Pulmonary fibrosis. No definite pneumothorax. No significant change         XR CHEST PORTABLE   Final Result   No recurrent pneumothorax. Chronic pattern of interstitial lung disease,   stable. XR CHEST PORTABLE   Final Result   Interstitial lung disease, which appears similar when compared to the   previous exam.      Small on lateral pneumothorax seen previously is not well visualized   currently. Small bore pleural catheter in place. XR CHEST PORTABLE   Final Result   No significant interval change. Small right lateral pneumothorax. Unchanged bilateral interstitial lung disease. XR CHEST PORTABLE   Final Result   Supportive tubing, including right chest tube are in stable positions. Small right lateral pneumothorax is developed. Lungs are less inflated. Advanced bilateral interstitial lung disease. Suspected interval atelectasis in the lung bases. Developing   pneumonia/pulmonary edema alternatively possible. XR CHEST PORTABLE   Final Result   Near complete resolution of right lateral pneumothorax.          XR CHEST PORTABLE   Final Result   1. Smaller RIGHT pneumothorax than previously, lateral measurement 13 mm,   previously 20 mm.   2. Unchanged right pleural catheter positioning. 3. Chronic pulmonary interstitial disease and probable emphysema bilateral   lungs. 4. No new infiltrate evident. 5. No pleural effusion evident. 6. Cardiomegaly. 7.  Calcific atherosclerotic disease aorta. XR CHEST PORTABLE   Final Result   Mildly enlarged right pneumothorax. XR CHEST PORTABLE   Final Result   Small recurrent pneumothorax lateral to the right lower lobe following   clamping of pleural catheter. The findings were sent to the Radiology Results [id number] at 12:19   pm on 12/1/2022 to be communicated to a licensed caregiver. XR CHEST PORTABLE   Final Result   1. No evidence of pneumothorax. 2.  Stable diffuse interstitial changes         XR CHEST PORTABLE   Final Result   Radiographic resolution of right pneumothorax with stable pleural catheter. Diffuse pattern of interstitial change throughout the lungs that is otherwise   stable. XR CHEST PORTABLE   Final Result   1. Significant improvement in the right-sided pneumothorax status post   placement of a pigtail pleural catheter. 2. Unchanged small bore chest tube within the right chest wall soft tissues. 3. Right upper extremity PICC tip projects over SVC. 4. Unchanged left-sided airspace opacities. Stable to mildly increased   right-sided airspace opacities. CT GUIDED PLEURAL DRAINAGE [facility]   Final Result   Successful CT-guided placement of a 15 Lebanese right-sided pigtail chest tube   as above. Chest tube was connected to pleura vac. CT GUIDED NEEDLE PLACEMENT   Final Result   Successful CT-guided placement of a right-sided 14 Lebanese pigtail chest tube.          XR CHEST STANDARD (2 VW)    (Results Pending)          Consults:     IP CONSULT TO CARDIOLOGY  IP CONSULT TO CARDIOTHORACIC SURGERY  IP CONSULT TO HOME CARE NEEDS    Disposition:  home     Condition at Discharge: Stable    Discharge Instructions/Follow-up:  CT surgery as outpt, pulm as outpt    Code Status:  FULL    Activity: activity as tolerated    Diet: regular diet      Discharge Medications:     Discharge Medication List as of 12/9/2022  2:04 PM             Details   dilTIAZem (CARDIZEM CD) 240 MG extended release capsule Take 1 capsule by mouth daily, Disp-30 capsule, R-2Normal                Details   furosemide (LASIX) 40 MG tablet Take 40 mg by mouth dailyHistorical Med      !! empagliflozin (JARDIANCE) 25 MG tablet Take 25 mg by mouth dailyHistorical Med      !! empagliflozin (JARDIANCE) 25 MG tablet Take 25 mg by mouth dailyHistorical Med      SITagliptin (JANUVIA) 100 MG tablet Take 100 mg by mouth dailyHistorical Med      Multiple Vitamins-Minerals (WOMENS ONE DAILY PO) Take 1 tablet by mouth dailyHistorical Med      nadolol (CORGARD) 20 MG tablet Take 20 mg by mouth dailyHistorical Med      pantoprazole (PROTONIX) 40 MG tablet TAKE 1 TABLET EVERY MORNING BEFORE BREAKFAST, Disp-90 tablet, R-1Normal      sertraline (ZOLOFT) 100 MG tablet TAKE 2 TABLETS EVERY DAY, Disp-180 tablet, R-1Normal      mirtazapine (REMERON) 30 MG tablet TAKE 1 TABLET EVERY NIGHT, Disp-90 tablet, R-1Normal      Pirfenidone 267 MG TABS pirfenidone daily for a week and then BID for a week and then back to TID., Disp-270 tablet, R-3NO PRINT      LORazepam (ATIVAN) 0.5 MG tablet Take 0.5 mg by mouth every 6 hours as needed for Anxiety. Historical Med      traMADol (ULTRAM) 50 MG tablet Take 50 mg by mouth 2 times daily as needed for Pain. Historical Med      albuterol sulfate HFA (PROVENTIL;VENTOLIN;PROAIR) 108 (90 Base) MCG/ACT inhaler INHALE 2 PUFFS INTO THE LUNGS EVERY 6 HOURS AS NEEDED FOR WHEEZING, Disp-3 each, R-1Normal      atorvastatin (LIPITOR) 40 MG tablet Take 1 tablet by mouth daily, Disp-90 tablet, R-1Normal      levothyroxine (SYNTHROID) 125 MCG tablet Take 1 tablet by mouth Daily TAKE 1 TABLET BY MOUTH EVERY DAY, Disp-90 tablet, R-1Normal      loratadine-pseudoephedrine ([employer] ALLERGY RELIEF-D12) 5-120 MG per extended release tablet TAKE 1 TABLET BY MOUTH TWICE A DAY, Disp-60 tablet, R-5Normal      benzonatate (TESSALON) 200 MG capsule TAKE 1 CAPSULE BY MOUTH 3 TIMES A DAY AS NEEDED FOR COUGH, Disp-90 capsule, R-5Normal      hydrOXYzine (ATARAX) 10 MG tablet Take 1 tablet by mouth every 8 hours as needed for Itching TAKE 1 TO 3 TABLETS BY MOUTH 3 TIMES DAILY AS NEEDED FOR ITCHING, Disp-90 tablet, R-1Normal      guaiFENesin (MUCINEX) 600 MG extended release tablet Take 600 mg by mouth every morningHistorical Med      albuterol (PROVENTIL) (2.5 MG/3ML) 0.083% nebulizer solution INHALE THE CONTENTS OF 1 VIAL VIA NEBULIZER EVERY 6 HOURS AS NEEDED FOR WHEEZING, Disp-720 mL, R-1Normal       !! - Potential duplicate medications found. Please discuss with provider. Time Spent on discharge: 35 min in the examination, evaluation, counseling and review of medications and discharge plan. Signed:    Helena Ferguson MD   12/9/2022      Thank you Torie Hernandez MD for the opportunity to be involved in this patient's care. If you have any questions or concerns, please feel free to contact me at [phone].

## 2022-12-10 ENCOUNTER — TELEPHONE (OUTPATIENT)
Dept: FAMILY MEDICINE CLINIC | Age: 67
End: 2022-12-10

## 2022-12-10 DIAGNOSIS — M54.16 LUMBAR RADICULOPATHY: ICD-10-CM

## 2022-12-10 RX ORDER — TRAMADOL HYDROCHLORIDE 50 MG/1
50-100 TABLET ORAL EVERY 6 HOURS PRN
Qty: 240 TABLET | Refills: 5 | Status: SHIPPED | OUTPATIENT
Start: 2022-12-10 | End: 2023-06-08

## 2022-12-10 RX ORDER — TRAMADOL HYDROCHLORIDE 50 MG/1
50 TABLET ORAL 2 TIMES DAILY PRN
Status: CANCELLED | OUTPATIENT
Start: 2022-12-10

## 2022-12-11 NOTE — CARE COORDINATION
Called [facility], spoke to Crystal to inform that chart is ready for review.    INTERDISCIPLINARY PLAN OF CARE CONFERENCE    Date/Time: 9/21/2022 2:03 PM  Completed by: Thomas Wynn RN, Case Management      Patient Name:  Zandra Obando  YOB: 1955  Admitting Diagnosis: Pneumonia due to COVID-19 virus [U07.1, J12.82]     Admit Date/Time:  9/9/2022  5:21 PM    Chart reviewed. Interdisciplinary team contacted or reviewed plan related to patient progress and discharge plans. Disciplines included Case Management, Nursing, and Dietitian. Current Status:IP 09/10/2022  PT/OT recommendation for discharge plan of care: Ordered    Expected D/C Disposition:  Home    Discharge Plan Comments: Chart reviewed. PTX -resolved and Chest tube to water seal today. Getting daily CXR. PT/OT ordered. Pt plans to return home with [facility].    CM following    Home O2 in place on admit: Yes ([facility] home O2 3 liters baseline)  Pt informed of need to bring portable home O2 tank on day of discharge for nursing to connect prior to leaving:  Not Indicated  Verbalized agreement/Understanding:  Not Indicated

## 2022-12-12 ENCOUNTER — CARE COORDINATION (OUTPATIENT)
Dept: CASE MANAGEMENT | Age: 67
End: 2022-12-12

## 2022-12-12 ENCOUNTER — TELEPHONE (OUTPATIENT)
Dept: PULMONOLOGY | Age: 67
End: 2022-12-12

## 2022-12-12 DIAGNOSIS — J93.83 OTHER PNEUMOTHORAX: Primary | ICD-10-CM

## 2022-12-12 PROCEDURE — 1111F DSCHRG MED/CURRENT MED MERGE: CPT | Performed by: FAMILY MEDICINE

## 2022-12-12 NOTE — CARE COORDINATION
[facility] Care Transitions Initial Follow Up Call    Call within 2 business days of discharge: Yes    Care Transition Nurse contacted the patient by telephone to perform post hospital discharge assessment. Verified name and  with patient as identifiers. Provided introduction to self, and explanation of the Care Transition Nurse role. Patient: Domi Obando Patient : 1955   MRN: [id number]  Reason for Admission: pneumothorax   Discharge Date: 22 RARS: Readmission Risk Score: 34.3      Last Discharge  [address]       Date Complaint Diagnosis Description Type Department Provider    22  Spontaneous pneumothorax Admission (Discharged) Belkys Stockton MD            Was this an external facility discharge? No Discharge Facility: n.a    Challenges to be reviewed by the provider   Additional needs identified to be addressed with provider: No  none               Method of communication with provider: none. CTN spoke with patient who reported she is doing alright. Patient reported she has chest tube in place and it is draining fine so far. Patient reported the dressing around insertion site is CD&I. CTN discussed s/sx to monitor and report. Patient has apt with surgeon on  and pulmonologist on 12/15. CTN reviewed all medications and patient reported she was instructed to stop lasix at discharge, patient to discuss with surgeon at apt on . Denies any acute needs at present time. Agreeable to f/u calls. Educated on the use of urgent care or physicians 24 hr access line if assistance is needed after hours. Care Transition Nurse reviewed discharge instructions, medical action plan, and red flags with patient who verbalized understanding. The patient was given an opportunity to ask questions and does not have any further questions or concerns at this time. Were discharge instructions available to patient? Yes.  Reviewed appropriate site of care based on symptoms and resources available to patient including: PCP  Specialist  When to call 911. The patient agrees to contact the PCP office for questions related to their healthcare. Advance Care Planning:   Does patient have an Advance Directive: reviewed and current. Advance Care Planning   Healthcare Decision Maker:    Primary Decision Maker: Ze Pierce - Spouse - [phone]    Secondary Decision Maker: Joseline Rogers - Child - [phone]    Click here to complete Healthcare Decision Makers including selection of the Healthcare Decision Maker Relationship (ie \"Primary\"). Today we documented Decision Maker(s) consistent with ACP documents on file. Medication reconciliation was performed with patient, who verbalizes understanding of administration of home medications. Medications reviewed, 1111F entered: yes    Was patient discharged with a pulse oximeter? no    Non-face-to-face services provided:  Obtained and reviewed discharge summary and/or continuity of care documents  Education of patient/family/caregiver/guardian to support self-management-.     Offered patient enrollment in the Remote Patient Monitoring (RPM) program for in-home monitoring: Patient is not eligible for RPM program.    Care Transitions 24 Hour Call    Do you have a copy of your discharge instructions?: Yes  Do you have all of your prescriptions and are they filled?: Yes  Have you been contacted by a [address]?: No  Have you scheduled your follow up appointment?: Yes  How are you going to get to your appointment?: Car - family or friend to transport  [address]: [address]  Patient DME: Rl Quiñones chair  Patient Home Equipment: Nebulizer, Oxygen  Do you have support at home?: Partner/Spouse/SO  Do you feel like you have everything you need to keep you well at home?: Yes  Are you an active caregiver in your home?: No  Care Transitions Interventions     Other Services: Completed            Follow Up  Future Appointments   Date Time Provider Calvin Buckleyi   12/13/2022 11:10 AM Melina Hightower, [address] S [facility]   12/15/2022 10:00 AM MD DURAN Smith PULM [facility]   1/5/2023 10:00 AM [facility] PULMONARY FUNCTION TESTING [facility] PFT [facility]   1/5/2023 11:15 AM MD DURAN Smith PULTJ [facility]   2/7/2023  8:30 AM MD Albert Ramachandran [facility]       Care Transition Nurse provided contact information. Plan for follow-up call in 5-7 days based on severity of symptoms and risk factors.   Plan for next call: self management-chest tube breathing   follow-up appointment-surgeon and pulmonologist     Thania OROURKE, RN, [facility]  Care Transition Nurse  [phone] mobile

## 2022-12-12 NOTE — TELEPHONE ENCOUNTER
Received a call from CLARA VAIL [facility] this morning saying she needs to schedule hosp f/u with Dr. Eileen Ma this week. Was D/c'd from [facility] on 12/9 and they told her she needs to f/u with Dr. Eileen Ma prior to her appt scheduled 1/5/23. The appt sched 1/5/23 was rescheduled due to provider not being available on 12/20 and office rescheduled appt. I told her Dr. Eileen Ma does not have any availability this week and that I'd need to check with him and/or his MA to see where we might be able to squeeze her in. Pt became emotional and put me on the phone with her . I reiterated that we'll do everything we can to add her to the schedule this week but given that he's not here today or Friday & has full schedules Tues - Thurs, I need to check to see first where we can fit her in. Told them we'll call back. She has appt tomorrow in [facility] at 11:15 and said they can come in the afternoon but Dr. Eileen Ma has a procedure scheduled currently.

## 2022-12-12 NOTE — TELEPHONE ENCOUNTER
Patient discharged from the hospital with chest tube. Patient thought she had refills of her tramadol left but she does not. Refill sent.

## 2022-12-13 ENCOUNTER — HOSPITAL ENCOUNTER (EMERGENCY)
Age: 67
Discharge: HOME | End: 2022-12-13
Payer: MEDICARE

## 2022-12-13 ENCOUNTER — OFFICE VISIT (OUTPATIENT)
Dept: CARDIOTHORACIC SURGERY | Age: 67
End: 2022-12-13

## 2022-12-13 ENCOUNTER — HOSPITAL ENCOUNTER (OUTPATIENT)
Dept: GENERAL RADIOLOGY | Age: 67
Discharge: HOME | End: 2022-12-13
Payer: MEDICARE

## 2022-12-13 ENCOUNTER — APPOINTMENT (OUTPATIENT)
Dept: GENERAL RADIOLOGY | Age: 67
End: 2022-12-13
Payer: MEDICARE

## 2022-12-13 VITALS
TEMPERATURE: 97 F | OXYGEN SATURATION: 98 % | DIASTOLIC BLOOD PRESSURE: 62 MMHG | SYSTOLIC BLOOD PRESSURE: 102 MMHG | HEIGHT: 63 IN | HEART RATE: 76 BPM | BODY MASS INDEX: 18.56 KG/M2

## 2022-12-13 VITALS
OXYGEN SATURATION: 100 % | DIASTOLIC BLOOD PRESSURE: 69 MMHG | SYSTOLIC BLOOD PRESSURE: 108 MMHG | TEMPERATURE: 97 F | RESPIRATION RATE: 18 BRPM | HEART RATE: 79 BPM

## 2022-12-13 DIAGNOSIS — Z01.89 ENCOUNTER FOR ROUTINE CHEST X-RAY: Primary | ICD-10-CM

## 2022-12-13 DIAGNOSIS — J93.83 SPONTANEOUS PNEUMOTHORAX: ICD-10-CM

## 2022-12-13 DIAGNOSIS — Z93.8 S/P CHEST TUBE PLACEMENT (HCC): Primary | ICD-10-CM

## 2022-12-13 PROCEDURE — 99283 EMERGENCY DEPT VISIT LOW MDM: CPT | Performed by: NURSE PRACTITIONER

## 2022-12-13 PROCEDURE — 71046 X-RAY EXAM CHEST 2 VIEWS: CPT

## 2022-12-13 PROCEDURE — 99024 POSTOP FOLLOW-UP VISIT: CPT

## 2022-12-13 ASSESSMENT — ENCOUNTER SYMPTOMS
VOMITING: 0
CHEST TIGHTNESS: 0
NAUSEA: 0
ABDOMINAL PAIN: 0
COUGH: 0
SHORTNESS OF BREATH: 0
BACK PAIN: 0

## 2022-12-13 ASSESSMENT — PAIN - FUNCTIONAL ASSESSMENT: PAIN_FUNCTIONAL_ASSESSMENT: NONE - DENIES PAIN

## 2022-12-13 NOTE — PROGRESS NOTES
[facility]  Clinic Note     12/13/2022 11:54 AM  Surgeon:  Juani Rowland     S/P :  Pneumostat in place, recurrent pneumothorax  and chest tube placement on 12/1/22    Chief complaint : 1st post-op  Subjective:  Ms. Judy Csaiano has no increased shortness of breath after tube placement, denies pain, no swelling. Over all per Ms. Obando she feels well. Vital Signs: /62 (Site: Left Upper Arm, Position: Sitting)   Pulse 76   Temp 97 °F (36.1 °C) (Infrared)   Ht 5' 3\" (1.6 m)   SpO2 98%   BMI 18.56 kg/m²      I/O:  No intake or output data in the 24 hours ending 12/13/22 1154    Exam:   Cardiovascular: Clear S1, S2. No MRG. Pulmonary: diminished bibasilar    Lower extremity: No peripheral edema. Incision: CT site clean, without drainage, or erythema. Chest X-Ray:  pneumothorax resolved since prior; post pull CXR stable, unchanged. Labs:   CBC: No results for input(s): WBC, HGB, HCT, MCV, PLT in the last 72 hours. BMP: No results for input(s): NA, K, CL, CO2, PHOS, BUN, CREATININE, CA in the last 72 hours. PT/INR: No results for input(s): PROTIME, INR in the last 72 hours. APTT: No results for input(s): APTT in the last 72 hours.     Scheduled Meds:     Patient Active Problem List   Diagnosis    Chest pain    HTN (hypertension)    Hyperlipidemia with target LDL less than 130    Hypokalemia    Insomnia    Trochanteric bursitis of both hips    Migraine    Syncope    Acute blood loss anemia    Fatigue    Hypothyroidism    Mild single current episode of major depressive disorder (HCC)    Anxiety    Pneumonia of both lower lobes due to infectious organism    A-fib ([address]    Atypical pneumonia    Acute bronchitis with bronchospasm    Acute on chronic diastolic CHF (congestive heart failure) ([facility])    Class 2 obesity with body mass index (BMI) of 39.0 to 39.9 in adult    Abnormal chest CT    Acute diastolic heart failure (HCC)    ILD (interstitial lung disease) ([address] Acute on chronic respiratory failure with hypoxia (HCC)    Restrictive lung disease    Abnormal CT of the chest    SOB (shortness of breath)    Acute cystitis without hematuria    Dyspnea and respiratory abnormalities    Acute on chronic respiratory failure with hypoxemia (HCC)    Cryptogenic organizing pneumonia (HCC)    Pneumothorax of left lung after biopsy    COPD (chronic obstructive pulmonary disease) (HCC)    Type 2 diabetes mellitus without complication (HCC)    Hyponatremia    Numbness and tingling in left hand    Ulnar neuropathy at elbow of left upper extremity    Acute congestive heart failure (HCC)    Left wrist pain    Left wrist tendinitis    GERD (gastroesophageal reflux disease)    Toxic metabolic encephalopathy    VIRGINIE (acute kidney injury) ([address]    Lumbar radiculopathy    Acute respiratory failure with hypoxia (HCC)    Rib pain on left side    Elevated brain natriuretic peptide (BNP) level    Fracture of multiple ribs of left side    Interstitial lung disease (HCC)    Hypomagnesemia    Depression    Chronic diastolic congestive heart failure (HCC)    Burst fracture of lumbar vertebra (HCC)    H/O Spinal surgery    Severe malnutrition ([address]    Pneumonia due to COVID-19 virus    Primary spontaneous pneumothorax    Secondary spontaneous pneumothorax    COVID-19    Acute on chronic respiratory failure (HCC)    Diarrhea    Pulmonary fibrosis (HCC)    PSVT (paroxysmal supraventricular tachycardia) (HCC)    PAF (paroxysmal atrial fibrillation) (HCC)    SVT (supraventricular tachycardia) (HCC)    Pneumothorax    Tension pneumothorax       Assessment/Plan:   S/p recurrent pneumothorax  and chest tube placement on 12/1/22  - CT removed in clinic without complications  - Instructed to leave dressing on for 48-72 hrs  - Post pull CXR stable  - F/u PRN    Saran Leija PA-C

## 2022-12-13 NOTE — ED PROVIDER NOTES
[address] ED  EMERGENCY DEPARTMENT ENCOUNTER        Pt Name: Dory Obando  MRN: [id number]  Eddiegfrodrigo 1955  Date of evaluation: 12/13/2022  Provider: PANDA Dodson - GISELLA  PCP: Abdias Hector MD  Note Started: 2:33 PM EST12/13/2022       TED. I have evaluated this patient. My supervising physician was available for consultation. Triage CHIEF COMPLAINT       Chief Complaint   Patient presents with    Other     Pt was seen today for post-op check; Right chest tube was removed by Dr. Tommie Salamanca; who told the pt to come to hospital for a chest xray just to confirm her lung did not collapse again. Pt is asymptomatic          HISTORY OF PRESENT ILLNESS   (Location/Symptom, Timing/Onset, Context/Setting, Quality, Duration, Modifying Factors, Severity)  Note limiting factors. Chief Complaint: Encounter for chest x-ray to rule out pneumothorax    Mery Torres is a 69 y.o. female who presents to the emergency department for an encounter to rule out pneumothorax after chest tube removal with the cardiothoracic surgery team as an outpatient. The patient was apparently supposed to go through the routine outpatient x-ray but instead checked in through the emergency department. This was a miscommunication I believe. I did go ahead and reach out to her cardiothoracic surgeon FRANCISCA Hagan and they advised that patient was not intended to go through the emergency department. In the meantime her x-ray was already performed and read as no pneumothorax. The plan is will be discharged home. They patient and the physician assistant had note nothing else further to add in did not request any further work-up through the emergency department. Nursing Notes were all reviewed and agreed with or any disagreements were addressed in the HPI.     REVIEW OF SYSTEMS    (2-9 systems for level 4, 10 or more for level 5)     Review of Systems   Constitutional:  Negative for chills and fever.   Respiratory:  Negative for cough, chest tightness and shortness of breath. Cardiovascular:  Negative for chest pain and leg swelling. Gastrointestinal:  Negative for abdominal pain, nausea and vomiting. Genitourinary:  Negative for difficulty urinating and flank pain. Musculoskeletal:  Negative for back pain and neck pain. Skin:  Negative for rash and wound. Neurological:  Negative for headaches. PAST MEDICAL HISTORY     Past Medical History:   Diagnosis Date    A-fib [facility])     Anxiety     Cryptogenic organizing pneumonia ([address]     Depression     GERD (gastroesophageal reflux disease)     Hyperlipidemia     On home oxygen therapy     uses O2 3L prn    Rash     Thyroid disease     hypothyroidism       SURGICAL HISTORY     Past Surgical History:   Procedure Laterality Date    ARM SURGERY Left 3/2/2020    LEFT ULNAR NERVE DECOMPRESSION AT THE ELBOW performed by Teresa Thompson MD at [facility] N/A 6/27/2019    EBUS WF W/ANES.  performed by Marquita Samuels MD at [address] [place] [address]  6/27/2019    BRONCHOSCOPY/TRANSBRONCHIAL NEEDLE BIOPSY performed by Marquita Samuels MD at [address] [place] [address]  6/27/2019    BRONCHOSCOPY/TRANSBRONCHIAL LUNG BIOPSY performed by Marquita Samuels MD at [address] [place] [address]  6/27/2019    BRONCHOSCOPY ALVEOLAR LAVAGE performed by Marquita Samuels MD at [address] [place] [address]  07/10/2019    BRONCHOSCOPY N/A 7/10/2019    BRONCHOSCOPY ALVEOLAR LAVAGE performed by Lita Mason MD at [address] [place] [address] Bilateral 08/06/2019    BRONCHOSCOPY N/A 8/6/2019    BRONCHOSCOPY ALVEOLAR LAVAGE performed by Kayren Primrose, MD at [address] [place] [address] N/A 12/24/2019    BRONCHOSCOPY ALVEOLAR LAVAGE performed by Libby Dye MD at [address] [place] [address] N/A 8/25/2022    COLONOSCOPY POLYPECTOMY SNARE/COLD BIOPSY performed by Ritchie Hines MD at [facility]    CT GUIDED CHEST TUBE  9/15/2022    CT GUIDED CHEST TUBE 9/15/2022 [facility] CT SCAN    CT INSERT CATH PLEURA W IMAGE  11/28/2022    CT INSERT CATH PLEURA W IMAGE 11/28/2022 Miky Chang MD [facility] CT SCAN    HYSTERECTOMY, TOTAL ABDOMINAL (CERVIX REMOVED)      partial       CURRENTMEDICATIONS       Previous Medications    ALBUTEROL (PROVENTIL) (2.5 MG/3ML) 0.083% NEBULIZER SOLUTION    INHALE THE CONTENTS OF 1 VIAL VIA NEBULIZER EVERY 6 HOURS AS NEEDED FOR WHEEZING    ALBUTEROL SULFATE HFA (PROVENTIL;VENTOLIN;PROAIR) 108 (90 BASE) MCG/ACT INHALER    INHALE 2 PUFFS INTO THE LUNGS EVERY 6 HOURS AS NEEDED FOR WHEEZING    ATORVASTATIN (LIPITOR) 40 MG TABLET    Take 1 tablet by mouth daily    BENZONATATE (TESSALON) 200 MG CAPSULE    TAKE 1 CAPSULE BY MOUTH 3 TIMES A DAY AS NEEDED FOR COUGH    DILTIAZEM (CARDIZEM CD) 240 MG EXTENDED RELEASE CAPSULE    Take 1 capsule by mouth daily    EMPAGLIFLOZIN (JARDIANCE) 25 MG TABLET    Take 25 mg by mouth daily    EMPAGLIFLOZIN (JARDIANCE) 25 MG TABLET    Take 25 mg by mouth daily    FUROSEMIDE (LASIX) 40 MG TABLET    Take 40 mg by mouth daily    GUAIFENESIN (MUCINEX) 600 MG EXTENDED RELEASE TABLET    Take 600 mg by mouth every morning    HYDROXYZINE (ATARAX) 10 MG TABLET    Take 1 tablet by mouth every 8 hours as needed for Itching TAKE 1 TO 3 TABLETS BY MOUTH 3 TIMES DAILY AS NEEDED FOR ITCHING    LEVOTHYROXINE (SYNTHROID) 125 MCG TABLET    Take 1 tablet by mouth Daily TAKE 1 TABLET BY MOUTH EVERY DAY    LORATADINE-PSEUDOEPHEDRINE ([employer] ALLERGY RELIEF-D12) 5-120 MG PER EXTENDED RELEASE TABLET    TAKE 1 TABLET BY MOUTH TWICE A DAY    LORAZEPAM (ATIVAN) 0.5 MG TABLET    Take 0.5 mg by mouth every 6 hours as needed for Anxiety.     MIRTAZAPINE (REMERON) 30 MG TABLET    TAKE 1 TABLET EVERY NIGHT    MULTIPLE VITAMINS-MINERALS (WOMENS ONE DAILY PO)    Take 1 tablet by mouth daily    NADOLOL (CORGARD) 20 MG TABLET    Take 20 mg by mouth daily    PANTOPRAZOLE (PROTONIX) 40 MG TABLET TAKE 1 TABLET EVERY MORNING BEFORE BREAKFAST    PIRFENIDONE 267 MG TABS    pirfenidone daily for a week and then BID for a week and then back to TID. SERTRALINE (ZOLOFT) 100 MG TABLET    TAKE 2 TABLETS EVERY DAY    SITAGLIPTIN (JANUVIA) 100 MG TABLET    Take 100 mg by mouth daily    TRAMADOL (ULTRAM) 50 MG TABLET    Take 50 mg by mouth 2 times daily as needed for Pain. TRAMADOL (ULTRAM) 50 MG TABLET    Take 1-2 tablets by mouth every 6 hours as needed for Pain for up to 180 days. Take lowest dose possible to manage pain       ALLERGIES     Penicillins, Cefuroxime, Doxycycline, Imitrex [sumatriptan], Meperidine, Other, Sulfa antibiotics, Keflex [cephalexin], and Tape [adhesive tape]    FAMILYHISTORY       Family History   Problem Relation Age of Onset    Diabetes Mother     High Blood Pressure Mother     Asthma Sister     Other Father         SOCIAL HISTORY       Social History     Socioeconomic History    Marital status:      Spouse name: None    Number of children: 7    Years of education: None    Highest education level: None   Tobacco Use    Smoking status: Never    Smokeless tobacco: Never   Vaping Use    Vaping Use: Never used   Substance and Sexual Activity    Alcohol use: No    Drug use: No    Sexual activity: Not Currently     Social Determinants of Health     Transportation Needs: No Transportation Needs    Lack of Transportation (Medical): No    Lack of Transportation (Non-Medical): No       SCREENINGS        Terrell Coma Scale  Eye Opening: Spontaneous  Best Verbal Response: Oriented  Best Motor Response: Obeys commands  Terrell Coma Scale Score: 15                CIWA Assessment  BP: 108/69  Heart Rate: 79             PHYSICAL EXAM    (up to 7 for level 4, 8 or more for level 5)     ED Triage Vitals [12/13/22 1353]   BP Temp Temp Source Heart Rate Resp SpO2 Height Weight   108/69 97 °F (36.1 °C) Oral 79 18 100 % -- --       Physical Exam  Vitals and nursing note reviewed.    Constitutional: Appearance: Normal appearance. She is not toxic-appearing or diaphoretic. HENT:      Head: Normocephalic and atraumatic. Nose: Nose normal.   Eyes:      General:         Right eye: No discharge. Left eye: No discharge. Cardiovascular:      Rate and Rhythm: Normal rate and regular rhythm. Pulses: Normal pulses. Pulmonary:      Effort: Pulmonary effort is normal. No respiratory distress. Breath sounds: No wheezing. Musculoskeletal:         General: Normal range of motion. Cervical back: Normal range of motion and neck supple. Skin:     General: Skin is warm and dry. Neurological:      General: No focal deficit present. Mental Status: She is alert and oriented to person, place, and time. Psychiatric:         Mood and Affect: Mood normal.         Behavior: Behavior normal.       DIAGNOSTIC RESULTS   LABS:    Labs Reviewed - No data to display    When ordered, only abnormal lab results are displayed. All other labs were within normal range or not returned as of this dictation. EKG: When ordered, EKG's are interpreted by the Emergency Department Physician in the absence of a cardiologist.  Please see their note for interpretation of EKG. RADIOLOGY:   Non-plain film images such as CT, Ultrasound and MRI are read by the radiologist. Plain radiographic images are visualized and preliminarily interpreted by the  ED Provider with the below findings:        Interpretation [facility] Radiologist below, if available at the time of this note:    XR CHEST (2 VW)   Final Result   Interval removal of right basilar drain. No pneumothorax. Advanced pattern of interstitial fibrosis.            XR CHEST (2 VW)    Result Date: 12/13/2022  EXAMINATION: TWO XRAY VIEWS OF THE CHEST 12/13/2022 1:45 pm COMPARISON: 12/13/2022 HISTORY: ORDERING SYSTEM PROVIDED HISTORY: sent in by pmd for concern for collapsed lung TECHNOLOGIST PROVIDED HISTORY: Reason for exam:->sent in by pmd for concern for collapsed lung Reason for Exam: ? collapsed lung FINDINGS: Fusion hardware of the thoraco lumbar spine and cholecystectomy clips are present. Right basilar pleural pigtail catheter is been removed in the interval. The cardiac silhouette, mediastinal and hilar contours are stable. Reticular and curvilinear interstitial opacities in the lungs are stable and fairly prominent with homogeneous distribution. No consolidation, pleural effusion or pneumothorax is identified. Interval removal of right basilar drain. No pneumothorax. Advanced pattern of interstitial fibrosis. XR CHEST (2 VW)    Result Date: 12/13/2022  Chest PA and lateral HISTORY: Spontaneous pneumothorax     Diffuse coarsened interstitial markings again seen compatible with chronic interstitial lung disease/pulmonary fibrosis. No acute consolidation. Right basilar pigtail pleural drainage catheter without pneumothorax. Stable cardiac mediastinal silhouette. Thoracolumbar spinal fixation hardware. No results found. PROCEDURES   Unless otherwise noted below, none     Procedures    CRITICAL CARE TIME   N/A    CONSULTS:  None      EMERGENCY DEPARTMENT COURSE and DIFFERENTIAL DIAGNOSIS/MDM:   Vitals:    Vitals:    12/13/22 1353   BP: 108/69   Pulse: 79   Resp: 18   Temp: 97 °F (36.1 °C)   TempSrc: Oral   SpO2: 100%       Patient was given the following medications:  Medications - No data to display      Is this patient to be included in the SEP-1 Core Measure due to severe sepsis or septic shock? No   Exclusion criteria - the patient is NOT to be included for SEP-1 Core Measure due to:  2+ SIRS criteria are not met    At this time we will go and plan for discharge. I again reached out to PA who has been taking care of the patient in the cardiothoracic team and at this time we will go and plan for discharge. X-ray confirming no evidence of pneumothorax. The patient has no medical complaints at this time. Requesting discharge. Plan is patient will follow-up as needed with cardiothoracic team and I advised patient if she has any acute concerns please return back to the ER for repeat evaluation. I am the Primary Clinician of Record. FINAL IMPRESSION      1.  Encounter for routine chest x-ray          DISPOSITION/PLAN   DISPOSITION Decision To Discharge 12/13/2022 02:29:24 PM      PATIENT REFERREDTO:  Malaika Adame MD  [address]  [place] [phone]    Schedule an appointment as soon as possible for a visit       [facility] ED  [address] [address]  [phone]  Go to   If symptoms worsen    [facility] [address] [place] [address]    Schedule an appointment as soon as possible for a visit   As needed      DISCHARGE MEDICATIONS:  New Prescriptions    No medications on file       DISCONTINUED MEDICATIONS:  Discontinued Medications    No medications on file              (Please note that portions ofthis note were completed with a voice recognition program.  Efforts were made to edit the dictations but occasionally words are mis-transcribed.)    PANDA Nava CNP (electronically signed)             PANDA Nava CNP  12/13/22 0920

## 2022-12-14 ENCOUNTER — CARE COORDINATION (OUTPATIENT)
Dept: CASE MANAGEMENT | Age: 67
End: 2022-12-14

## 2022-12-14 NOTE — CARE COORDINATION
[address] [facility] ED Follow Up Call    2022    Patient: Malvin Obando Patient : 1955   MRN: [id number]  Reason for Admission: Chest tube removed and patient sent to ER for CXR  Discharge Date: 22      Attempted to reach patient via phone for ED hospital transition call. VM left stating purpose of call along with my contact information requesting a return call.             Care Transitions ED Follow Up    Care Transitions Interventions     Other Services: Completed     Do you have all of your prescriptions and are they filled?: Yes   Post Acute Services: Home Health         Patient DME: Joshua Tee chair   Patient Home Equipment: Nebulizer, Oxygen   Oxygen Regimen: Continuous          Paula OROURKE, RN, [facility]  Care Transition Nurse  [phone] mobile

## 2022-12-15 ENCOUNTER — OFFICE VISIT (OUTPATIENT)
Dept: PULMONOLOGY | Age: 67
End: 2022-12-15
Payer: MEDICARE

## 2022-12-15 ENCOUNTER — HOSPITAL ENCOUNTER (OUTPATIENT)
Age: 67
Discharge: HOME | End: 2022-12-15
Payer: MEDICARE

## 2022-12-15 ENCOUNTER — CARE COORDINATION (OUTPATIENT)
Dept: CASE MANAGEMENT | Age: 67
End: 2022-12-15

## 2022-12-15 ENCOUNTER — HOSPITAL ENCOUNTER (OUTPATIENT)
Dept: GENERAL RADIOLOGY | Age: 67
Discharge: HOME | End: 2022-12-15
Payer: MEDICARE

## 2022-12-15 VITALS
HEART RATE: 88 BPM | RESPIRATION RATE: 18 BRPM | BODY MASS INDEX: 18.56 KG/M2 | OXYGEN SATURATION: 100 % | HEIGHT: 63 IN | DIASTOLIC BLOOD PRESSURE: 70 MMHG | SYSTOLIC BLOOD PRESSURE: 119 MMHG

## 2022-12-15 DIAGNOSIS — R06.02 SOB (SHORTNESS OF BREATH): ICD-10-CM

## 2022-12-15 DIAGNOSIS — R06.02 SOB (SHORTNESS OF BREATH): Primary | ICD-10-CM

## 2022-12-15 PROCEDURE — 99214 OFFICE O/P EST MOD 30 MIN: CPT | Performed by: INTERNAL MEDICINE

## 2022-12-15 PROCEDURE — 1123F ACP DISCUSS/DSCN MKR DOCD: CPT | Performed by: INTERNAL MEDICINE

## 2022-12-15 PROCEDURE — 1111F DSCHRG MED/CURRENT MED MERGE: CPT | Performed by: INTERNAL MEDICINE

## 2022-12-15 PROCEDURE — G8427 DOCREV CUR MEDS BY ELIG CLIN: HCPCS | Performed by: INTERNAL MEDICINE

## 2022-12-15 PROCEDURE — 3074F SYST BP LT 130 MM HG: CPT | Performed by: INTERNAL MEDICINE

## 2022-12-15 PROCEDURE — 3017F COLORECTAL CA SCREEN DOC REV: CPT | Performed by: INTERNAL MEDICINE

## 2022-12-15 PROCEDURE — 71046 X-RAY EXAM CHEST 2 VIEWS: CPT

## 2022-12-15 PROCEDURE — G8484 FLU IMMUNIZE NO ADMIN: HCPCS | Performed by: INTERNAL MEDICINE

## 2022-12-15 PROCEDURE — 3078F DIAST BP <80 MM HG: CPT | Performed by: INTERNAL MEDICINE

## 2022-12-15 PROCEDURE — G8400 PT W/DXA NO RESULTS DOC: HCPCS | Performed by: INTERNAL MEDICINE

## 2022-12-15 PROCEDURE — 1036F TOBACCO NON-USER: CPT | Performed by: INTERNAL MEDICINE

## 2022-12-15 PROCEDURE — 1090F PRES/ABSN URINE INCON ASSESS: CPT | Performed by: INTERNAL MEDICINE

## 2022-12-15 PROCEDURE — G8420 CALC BMI NORM PARAMETERS: HCPCS | Performed by: INTERNAL MEDICINE

## 2022-12-15 NOTE — CARE COORDINATION
[address] [facility] ED Follow Up Call    12/15/2022    Patient: Katie Obando Patient : 1955   MRN: [id number]  Reason for Admission: pneumothorax   Discharge Date: 22      Final attempt made to reach patient for post hospital follow up call. Left a voice message for patient with my contact information and informed of final outreach attempt.              Care Transitions ED Follow Up    Care Transitions Interventions     Other Services: Completed     Do you have all of your prescriptions and are they filled?: Yes   Post Acute Services: Home Health         Patient DME: Matt Kunz [facility]   Patient Home Equipment: Nebulizer, Oxygen   Oxygen Regimen: Fernandez OROURKE, RN, [facility]  Care Transition Nurse  [phone] mobile

## 2022-12-15 NOTE — PROGRESS NOTES
[facility]                                 Pulmonary Consult /Progress Note :                                                                  CHIEF COMPLAINT: SOB  Reason ILD with acute exacerbation. Hypoxia acute on chronic.         HPI:   Patient is 56-year-old female with extensive work-up for interstitial lung disease that include bronchoscopy with EBUS as well as cryobiopsy and reviewing her records showing most likely organizing pneumonia along with possibility of nonspecific interstitial pneumonia    She used to follow with Dr. Toi Crenshaw and she was placed on pirfenidone    The patient denies any history of previously smoking    She uses 3 L of home oxygen and she noticed that her oxygen requirement has been increasing over the last few days along with shortness of breath and dyspnea on exertion    During the admission ,She was a placed on steroids in the past however she has not been taking any steroids for more than a year    Denies any sputum, most of her cough dry, denies any leg swelling, denies any history of travel or COVID exposure and more short winded    Denies any fever chills chest pain no hemoptysis    Today Visit    She was admitted end of November for covid and Pneumothorax  She was sent to [facility] [place] for pleurodesis however pneumothorax was improved and she left home on pneumostat  that was removed on 12/13  She is on 4 L  She on esbriet   She  is doing fine  Use Albuterol        Past Medical History:   Diagnosis Date    A-fib ([address]     Anxiety     Cryptogenic organizing pneumonia ([address]     Depression     GERD (gastroesophageal reflux disease)     Hyperlipidemia     On home oxygen therapy     uses O2 3L prn    Rash     Thyroid disease     hypothyroidism       Past Surgical History:        Procedure Laterality Date    ARM SURGERY Left 3/2/2020    LEFT ULNAR NERVE DECOMPRESSION AT THE ELBOW performed by Bethel Salcedo MD at [address] [place] [address] OR    BRONCHOSCOPY N/A 6/27/2019    EBUS WF W/ANES. performed by Harry Fox MD at [address]  6/27/2019    BRONCHOSCOPY/TRANSBRONCHIAL NEEDLE BIOPSY performed by Harry Fox MD at [address]  6/27/2019    BRONCHOSCOPY/TRANSBRONCHIAL LUNG BIOPSY performed by Harry Fox MD at [address]  6/27/2019    BRONCHOSCOPY ALVEOLAR LAVAGE performed by Harry Fox MD at [address]  07/10/2019    BRONCHOSCOPY N/A 7/10/2019    BRONCHOSCOPY ALVEOLAR LAVAGE performed by Pauline Mullins MD at [address] Bilateral 08/06/2019    BRONCHOSCOPY N/A 8/6/2019    BRONCHOSCOPY ALVEOLAR LAVAGE performed by Katie Rodriguez MD at [address] N/A 12/24/2019    BRONCHOSCOPY ALVEOLAR LAVAGE performed by Lori Pendleton MD at [address] [place] [address] N/A 8/25/2022    COLONOSCOPY POLYPECTOMY SNARE/COLD BIOPSY performed by Niko Mendez MD at [address]  9/15/2022    CT GUIDED CHEST TUBE 9/15/2022 [facility] CT SCAN    CT INSERT CATH PLEURA W IMAGE  11/28/2022    CT INSERT CATH PLEURA W IMAGE 11/28/2022 Marie Abbott MD [facility] CT SCAN    HYSTERECTOMY, TOTAL ABDOMINAL (CERVIX REMOVED)      partial       Allergies:  is allergic to penicillins, cefuroxime, doxycycline, imitrex [sumatriptan], meperidine, other, sulfa antibiotics, keflex [cephalexin], and tape [adhesive tape]. Social History:    TOBACCO:   reports that she has never smoked. She has never used smokeless tobacco.  ETOH:   reports no history of alcohol use. Family History:       Problem Relation Age of Onset    Diabetes Mother     High Blood Pressure Mother     Asthma Sister     Other Father        Current Medications:    Current Outpatient Medications:     traMADol (ULTRAM) 50 MG tablet, Take 1-2 tablets by mouth every 6 hours as needed for Pain for up to 180 days.  Take lowest dose possible to manage pain, Disp: 240 tablet, Rfl: 5    dilTIAZem (CARDIZEM CD) 240 MG extended release capsule, Take 1 capsule by mouth daily, Disp: 30 capsule, Rfl: 2    furosemide (LASIX) 40 MG tablet, Take 40 mg by mouth daily, Disp: , Rfl:     empagliflozin (JARDIANCE) 25 MG tablet, Take 25 mg by mouth daily, Disp: , Rfl:     empagliflozin (JARDIANCE) 25 MG tablet, Take 25 mg by mouth daily, Disp: , Rfl:     SITagliptin (JANUVIA) 100 MG tablet, Take 100 mg by mouth daily, Disp: , Rfl:     Multiple Vitamins-Minerals (WOMENS ONE DAILY PO), Take 1 tablet by mouth daily, Disp: , Rfl:     nadolol (CORGARD) 20 MG tablet, Take 20 mg by mouth daily, Disp: , Rfl:     pantoprazole (PROTONIX) 40 MG tablet, TAKE 1 TABLET EVERY MORNING BEFORE BREAKFAST, Disp: 90 tablet, Rfl: 1    sertraline (ZOLOFT) 100 MG tablet, TAKE 2 TABLETS EVERY DAY, Disp: 180 tablet, Rfl: 1    mirtazapine (REMERON) 30 MG tablet, TAKE 1 TABLET EVERY NIGHT, Disp: 90 tablet, Rfl: 1    Pirfenidone 267 MG TABS, pirfenidone daily for a week and then BID for a week and then back to TID., Disp: 270 tablet, Rfl: 3    LORazepam (ATIVAN) 0.5 MG tablet, Take 0.5 mg by mouth every 6 hours as needed for Anxiety. , Disp: , Rfl:     traMADol (ULTRAM) 50 MG tablet, Take 50 mg by mouth 2 times daily as needed for Pain., Disp: , Rfl:     albuterol sulfate HFA (PROVENTIL;VENTOLIN;PROAIR) 108 (90 Base) MCG/ACT inhaler, INHALE 2 PUFFS INTO THE LUNGS EVERY 6 HOURS AS NEEDED FOR WHEEZING, Disp: 3 each, Rfl: 1    atorvastatin (LIPITOR) 40 MG tablet, Take 1 tablet by mouth daily, Disp: 90 tablet, Rfl: 1    levothyroxine (SYNTHROID) 125 MCG tablet, Take 1 tablet by mouth Daily TAKE 1 TABLET BY MOUTH EVERY DAY, Disp: 90 tablet, Rfl: 1    loratadine-pseudoephedrine ([employer] ALLERGY RELIEF-D12) 5-120 MG per extended release tablet, TAKE 1 TABLET BY MOUTH TWICE A DAY (Patient taking differently: Take 1 tablet by mouth daily TAKE 1 TABLET BY MOUTH ONCE A DAY), Disp: 60 tablet, Rfl: 5 benzonatate (TESSALON) 200 MG capsule, TAKE 1 CAPSULE BY MOUTH 3 TIMES A DAY AS NEEDED FOR COUGH, Disp: 90 capsule, Rfl: 5    hydrOXYzine (ATARAX) 10 MG tablet, Take 1 tablet by mouth every 8 hours as needed for Itching TAKE 1 TO 3 TABLETS BY MOUTH 3 TIMES DAILY AS NEEDED FOR ITCHING, Disp: 90 tablet, Rfl: 1    albuterol (PROVENTIL) (2.5 MG/3ML) 0.083% nebulizer solution, INHALE THE CONTENTS OF 1 VIAL VIA NEBULIZER EVERY 6 HOURS AS NEEDED FOR WHEEZING, Disp: 720 mL, Rfl: 1      REVIEW OF SYSTEMS:  Constitutional: Negative for fever  HENT: Negative for sore throat  Eyes: Negative for redness   Respiratory: + for dyspnea, cough  Cardiovascular: Negative for chest pain  Gastrointestinal: Negative for vomiting, diarrhea   Genitourinary: Negative for hematuria   Musculoskeletal: Negative for arthralgias   Skin: Negative for rash  Neurological: Negative for syncope  Hematological: Negative for adenopathy  Psychiatric/Behavorial: Negative for anxiety      Objective:   PHYSICAL EXAM:    Blood pressure 119/70, pulse 88, resp. rate 18, height 5' 3\" (1.6 m), SpO2 100 %, not currently breastfeeding.' on RA  Gen: No distress. Eyes: PERRL. No sclera icterus. No conjunctival injection. ENT: No discharge. Pharynx clear. Neck: Trachea midline. No obvious mass. Resp: Rhonchi bilaterally no clear Rales   CV: Regular rate. Regular rhythm. No murmur or rub. No edema. GI: Non-tender. Non-distended. No hernia. Skin: Warm and dry. No nodule on exposed extremities. Lymph: No cervical LAD. No supraclavicular LAD. M/S: No cyanosis. No joint deformity. No clubbing. Neuro: Awake. Alert. Moves all four extremities. Psych: Oriented x 3. No anxiety.            DATA reviewed by me:   CXR  CT       LABS/IMAGING:    CBC:  Lab Results   Component Value Date    WBC 8.1 12/08/2022    HGB 9.1 (L) 12/08/2022    HCT 29.2 (L) 12/08/2022    MCV 80.6 12/08/2022     12/08/2022    LYMPHOPCT 16.0 12/08/2022    RBC 3.62 (L) 12/08/2022 MCH 25.1 (L) 12/08/2022    MCHC 31.1 12/08/2022    RDW 19.4 (H) 12/08/2022    NEUTOPHILPCT 68.4 12/08/2022    MONOPCT 6.5 12/08/2022    BASOPCT 0.7 12/08/2022    NEUTROABS 5.6 12/08/2022    LYMPHSABS 1.3 12/08/2022    MONOSABS 0.5 12/08/2022    EOSABS 0.7 (H) 12/08/2022    BASOSABS 0.1 12/08/2022       Recent Labs     12/08/22  1135 12/03/22  0545 12/02/22  0754   WBC 8.1 12.8* 9.8   HGB 9.1* 10.2* 9.7*   HCT 29.2* 32.5* 30.9*   MCV 80.6 79.2* 79.2*    285 215       BMP:   No results for input(s): NA, K, CL, CO2, PHOS, BUN, CREATININE, CA in the last 72 hours. MG:   Lab Results   Component Value Date/Time    MG 1.80 12/08/2022 11:35 AM     Ca/Phos:   Lab Results   Component Value Date    CALCIUM 8.7 12/08/2022    PHOS 4.6 12/08/2022     LIVER PROFILE:   No results for input(s): AST, ALT, LIPASE, BILIDIR, BILITOT, ALKPHOS in the last 72 hours. Invalid input(s): AMYLASE,  ALB      PT/INR: No results for input(s): PROTIME, INR in the last 72 hours. APTT: No results for input(s): APTT in the last 72 hours. Cardiac Enzymes:  Lab Results   Component Value Date    CKTOTAL 23 (L) 07/10/2019    TROPONINI <0.01 11/26/2022       Assessment:       -Based on the biopsies, seems h/o of cryptogenic organizing pneumonia and possibly coexisting NSIP or chronic HP.    -Acute exacerbation of ILD   -History of COPD  -Chronic respiratory failure-      Plan:      *- On pirfenidone   *s/p Pneumothostat with recurrent pneumo,CTS felt no pleurodeisis needed   *- on albuterol as needed  *- will get CXR   *- will see in 3 months   She was asked to go ER with any worsen SOB or P      Thank you very much for allowing me to participate in the care of this pleasant patient , should you have any questions ,please do not hesitate to contact me      Eugene Davis MD,[facility]  Pulmonary&Critical Care Medicine    Catalina Diaz    NOTE: This report was transcribed using voice recognition software.  Every effort was made to ensure accuracy; however, inadvertent computerized transcription errors may be present.

## 2022-12-16 ENCOUNTER — TELEPHONE (OUTPATIENT)
Dept: FAMILY MEDICINE CLINIC | Age: 67
End: 2022-12-16

## 2022-12-16 DIAGNOSIS — J93.11 PRIMARY SPONTANEOUS PNEUMOTHORAX: Primary | ICD-10-CM

## 2022-12-16 NOTE — TELEPHONE ENCOUNTER
----- Message from Thai Henry sent at 12/16/2022 11:40 AM EST -----  Subject: Refill Request    QUESTIONS  Name of Medication? ondansetron (ZOFRAN-ODT) 4 MG disintegrating tablet  Patient-reported dosage and instructions? 1 tablet. Has 2 pills left  How many days do you have left? 2  Preferred Pharmacy? [facility] #[id number]  Pharmacy phone number (if available)? [phone]  Additional Information for Provider? Patient is feeling nauseous and would   like to have this medication sent in. She was just discharged last Friday   from [facility] for a collapsed lung. She received this medication   before for the same reason. Thinks this is due to the medication for her   lungs that she has taken before. Sometimes she's ok with it but other   times not. Please call to advise. Also, wants to know if Dr. Amy Norman wants   her to do a phone visit since she was in the hospital. He can read the   notes from the hospital.   ---------------------------------------------------------------------------  --------------  [address]  What is the best way for the office to contact you? OK to leave message on   voicemail  Preferred Call Back Phone Number? [phone]  ---------------------------------------------------------------------------  --------------  SCRIPT ANSWERS  Relationship to Patient?  Self

## 2022-12-16 NOTE — TELEPHONE ENCOUNTER
----- Message from Melissa Poe sent at 12/16/2022 11:33 AM EST -----  Subject: Referral Request    Reason for referral request? Patient is requesting a referral to be sent   to [address] [place] [address] for Physical/Occupational   Therapy. Provider patient wants to be referred to(if known):     Provider Phone Number(if known):     Additional Information for Provider?   ---------------------------------------------------------------------------  --------------  [id number]    [phone]; OK to leave message on voicemail  ---------------------------------------------------------------------------  --------------

## 2022-12-19 PROBLEM — Z99.81 DEPENDENCE ON SUPPLEMENTAL OXYGEN: Status: ACTIVE | Noted: 2022-12-11

## 2022-12-19 NOTE — TELEPHONE ENCOUNTER
Patient stated it is for the weakness and strengthening. She stated she stated she needs the nursing because she just got out of hospital with collapsed lung.

## 2022-12-21 DIAGNOSIS — E11.69 COMBINED HYPERLIPIDEMIA ASSOCIATED WITH TYPE 2 DIABETES MELLITUS (HCC): ICD-10-CM

## 2022-12-21 DIAGNOSIS — E78.2 COMBINED HYPERLIPIDEMIA ASSOCIATED WITH TYPE 2 DIABETES MELLITUS (HCC): ICD-10-CM

## 2022-12-22 RX ORDER — LORATADINE/PSEUDOEPHEDRINE 5 MG-120MG
TABLET, EXTENDED RELEASE 12 HR ORAL
Qty: 180 TABLET | Refills: 1 | Status: SHIPPED | OUTPATIENT
Start: 2022-12-22

## 2022-12-22 RX ORDER — ATORVASTATIN CALCIUM 40 MG/1
TABLET, FILM COATED ORAL
Qty: 90 TABLET | Refills: 1 | Status: SHIPPED | OUTPATIENT
Start: 2022-12-22

## 2023-01-01 ENCOUNTER — TELEPHONE (OUTPATIENT)
Dept: CARDIAC REHAB | Age: 68
End: 2023-01-01

## 2023-01-13 RX ORDER — ALBUTEROL SULFATE 90 UG/1
2 AEROSOL, METERED RESPIRATORY (INHALATION) EVERY 6 HOURS PRN
Qty: 3 EACH | Refills: 1 | Status: SHIPPED | OUTPATIENT
Start: 2023-01-13

## 2023-01-16 ENCOUNTER — HOSPITAL ENCOUNTER (INPATIENT)
Age: 68
LOS: 1 days | Discharge: HOME HEALTH | DRG: 189 | End: 2023-01-18
Attending: EMERGENCY MEDICINE | Admitting: INTERNAL MEDICINE
Payer: MEDICARE

## 2023-01-16 DIAGNOSIS — R04.0 EPISTAXIS: Primary | ICD-10-CM

## 2023-01-16 DIAGNOSIS — I48.0 PAF (PAROXYSMAL ATRIAL FIBRILLATION) (HCC): ICD-10-CM

## 2023-01-16 DIAGNOSIS — E83.42 HYPOMAGNESEMIA: ICD-10-CM

## 2023-01-16 DIAGNOSIS — J96.11 CHRONIC HYPOXEMIC RESPIRATORY FAILURE (HCC): ICD-10-CM

## 2023-01-16 DIAGNOSIS — I48.91 ATRIAL FIBRILLATION WITH RVR (HCC): ICD-10-CM

## 2023-01-16 DIAGNOSIS — E87.6 HYPOKALEMIA: ICD-10-CM

## 2023-01-16 DIAGNOSIS — J44.1 COPD EXACERBATION (HCC): ICD-10-CM

## 2023-01-16 PROCEDURE — 85730 THROMBOPLASTIN TIME PARTIAL: CPT

## 2023-01-16 PROCEDURE — 85025 COMPLETE CBC W/AUTO DIFF WBC: CPT

## 2023-01-16 PROCEDURE — 51701 INSERT BLADDER CATHETER: CPT

## 2023-01-16 PROCEDURE — 83735 ASSAY OF MAGNESIUM: CPT

## 2023-01-16 PROCEDURE — 99285 EMERGENCY DEPT VISIT HI MDM: CPT

## 2023-01-16 PROCEDURE — 80053 COMPREHEN METABOLIC PANEL: CPT

## 2023-01-16 PROCEDURE — 82803 BLOOD GASES ANY COMBINATION: CPT

## 2023-01-16 PROCEDURE — 85610 PROTHROMBIN TIME: CPT

## 2023-01-16 PROCEDURE — 96365 THER/PROPH/DIAG IV INF INIT: CPT

## 2023-01-16 PROCEDURE — 96375 TX/PRO/DX INJ NEW DRUG ADDON: CPT

## 2023-01-16 PROCEDURE — 83880 ASSAY OF NATRIURETIC PEPTIDE: CPT

## 2023-01-16 PROCEDURE — 84484 ASSAY OF TROPONIN QUANT: CPT

## 2023-01-16 PROCEDURE — 36415 COLL VENOUS BLD VENIPUNCTURE: CPT

## 2023-01-16 RX ORDER — OXYMETAZOLINE HYDROCHLORIDE 0.05 G/100ML
SPRAY NASAL
Status: COMPLETED
Start: 2023-01-16 | End: 2023-01-17

## 2023-01-17 ENCOUNTER — APPOINTMENT (OUTPATIENT)
Dept: CT IMAGING | Age: 68
DRG: 189 | End: 2023-01-17
Payer: MEDICARE

## 2023-01-17 ENCOUNTER — APPOINTMENT (OUTPATIENT)
Dept: GENERAL RADIOLOGY | Age: 68
DRG: 189 | End: 2023-01-17
Payer: MEDICARE

## 2023-01-17 PROBLEM — J44.1 COPD EXACERBATION (HCC): Status: ACTIVE | Noted: 2023-01-17

## 2023-01-17 PROBLEM — I48.91 ATRIAL FIBRILLATION WITH RVR (HCC): Status: ACTIVE | Noted: 2023-01-17

## 2023-01-17 PROBLEM — J96.11 CHRONIC HYPOXEMIC RESPIRATORY FAILURE (HCC): Status: ACTIVE | Noted: 2023-01-17

## 2023-01-17 PROBLEM — J96.02 ACUTE RESPIRATORY FAILURE WITH HYPOXIA AND HYPERCAPNIA (HCC): Status: ACTIVE | Noted: 2022-01-24

## 2023-01-17 PROBLEM — R04.0 EPISTAXIS: Status: ACTIVE | Noted: 2023-01-17

## 2023-01-17 PROBLEM — J96.01 ACUTE HYPOXEMIC RESPIRATORY FAILURE (HCC): Status: ACTIVE | Noted: 2023-01-17

## 2023-01-17 LAB
A/G RATIO: 1.1 (ref 1.1–2.2)
ALBUMIN SERPL-MCNC: 3.8 G/DL (ref 3.4–5)
ALP BLD-CCNC: 162 U/L (ref 40–129)
ALT SERPL-CCNC: 28 U/L (ref 10–40)
ANION GAP SERPL CALCULATED.3IONS-SCNC: 9 MMOL/L (ref 3–16)
APTT: 29.7 SEC (ref 23–34.3)
AST SERPL-CCNC: 49 U/L (ref 15–37)
BASE EXCESS VENOUS: 10.8 MMOL/L (ref -3–3)
BASOPHILS ABSOLUTE: 0 K/UL (ref 0–0.2)
BASOPHILS RELATIVE PERCENT: 0.5 %
BILIRUB SERPL-MCNC: 0.5 MG/DL (ref 0–1)
BILIRUBIN URINE: NEGATIVE
BLOOD, URINE: NEGATIVE
BUN BLDV-MCNC: 9 MG/DL (ref 7–20)
CALCIUM SERPL-MCNC: 9.7 MG/DL (ref 8.3–10.6)
CARBOXYHEMOGLOBIN: 1 % (ref 0–1.5)
CHLORIDE BLD-SCNC: 97 MMOL/L (ref 99–110)
CLARITY: CLEAR
CO2: 36 MMOL/L (ref 21–32)
COLOR: YELLOW
CREAT SERPL-MCNC: <0.5 MG/DL (ref 0.6–1.2)
EKG ATRIAL RATE: 119 BPM
EKG DIAGNOSIS: NORMAL
EKG P AXIS: 18 DEGREES
EKG P-R INTERVAL: 144 MS
EKG Q-T INTERVAL: 330 MS
EKG QRS DURATION: 80 MS
EKG QTC CALCULATION (BAZETT): 464 MS
EKG R AXIS: 128 DEGREES
EKG T AXIS: -2 DEGREES
EKG VENTRICULAR RATE: 119 BPM
EOSINOPHILS ABSOLUTE: 0.2 K/UL (ref 0–0.6)
EOSINOPHILS RELATIVE PERCENT: 2.2 %
GFR SERPL CREATININE-BSD FRML MDRD: >60 ML/MIN/{1.73_M2}
GLUCOSE BLD-MCNC: 104 MG/DL (ref 70–99)
GLUCOSE BLD-MCNC: 130 MG/DL (ref 70–99)
GLUCOSE BLD-MCNC: 140 MG/DL (ref 70–99)
GLUCOSE BLD-MCNC: 153 MG/DL (ref 70–99)
GLUCOSE URINE: NEGATIVE MG/DL
HCO3 VENOUS: 37.4 MMOL/L (ref 23–29)
HCT VFR BLD CALC: 34.8 % (ref 36–48)
HEMOGLOBIN: 11 G/DL (ref 12–16)
INR BLD: 1.15 (ref 0.87–1.14)
KETONES, URINE: NEGATIVE MG/DL
LACTIC ACID: 1.7 MMOL/L (ref 0.4–2)
LEUKOCYTE ESTERASE, URINE: NEGATIVE
LYMPHOCYTES ABSOLUTE: 1.1 K/UL (ref 1–5.1)
LYMPHOCYTES RELATIVE PERCENT: 13.6 %
MAGNESIUM: 1.6 MG/DL (ref 1.8–2.4)
MCH RBC QN AUTO: 25.2 PG (ref 26–34)
MCHC RBC AUTO-ENTMCNC: 31.5 G/DL (ref 31–36)
MCV RBC AUTO: 79.9 FL (ref 80–100)
METHEMOGLOBIN VENOUS: 1.1 %
MICROSCOPIC EXAMINATION: NORMAL
MONOCYTES ABSOLUTE: 0.4 K/UL (ref 0–1.3)
MONOCYTES RELATIVE PERCENT: 5.2 %
NEUTROPHILS ABSOLUTE: 6.6 K/UL (ref 1.7–7.7)
NEUTROPHILS RELATIVE PERCENT: 78.5 %
NITRITE, URINE: NEGATIVE
O2 SAT, VEN: 27 %
O2 THERAPY: ABNORMAL
PCO2, VEN: 59.9 MMHG (ref 40–50)
PDW BLD-RTO: 18.3 % (ref 12.4–15.4)
PERFORMED ON: ABNORMAL
PH UA: 6.5 (ref 5–8)
PH VENOUS: 7.41 (ref 7.35–7.45)
PLATELET # BLD: 194 K/UL (ref 135–450)
PMV BLD AUTO: 8 FL (ref 5–10.5)
PO2, VEN: 18.1 MMHG (ref 25–40)
POTASSIUM REFLEX MAGNESIUM: 3.3 MMOL/L (ref 3.5–5.1)
PRO-BNP: 553 PG/ML (ref 0–124)
PROCALCITONIN: 0.06 NG/ML (ref 0–0.15)
PROTEIN UA: NEGATIVE MG/DL
PROTHROMBIN TIME: 14.6 SEC (ref 11.7–14.5)
RAPID INFLUENZA  B AGN: NEGATIVE
RAPID INFLUENZA A AGN: NEGATIVE
RBC # BLD: 4.35 M/UL (ref 4–5.2)
SARS-COV-2, NAAT: NOT DETECTED
SODIUM BLD-SCNC: 142 MMOL/L (ref 136–145)
SPECIFIC GRAVITY UA: <=1.005 (ref 1–1.03)
TCO2 CALC VENOUS: 39 MMOL/L
TOTAL PROTEIN: 7.2 G/DL (ref 6.4–8.2)
TROPONIN: 0.01 NG/ML
TROPONIN: <0.01 NG/ML
TROPONIN: <0.01 NG/ML
TSH REFLEX: 0.39 UIU/ML (ref 0.27–4.2)
URINE REFLEX TO CULTURE: NORMAL
URINE TYPE: NORMAL
UROBILINOGEN, URINE: 0.2 E.U./DL
WBC # BLD: 8.5 K/UL (ref 4–11)

## 2023-01-17 PROCEDURE — 83605 ASSAY OF LACTIC ACID: CPT

## 2023-01-17 PROCEDURE — 2580000003 HC RX 258: Performed by: EMERGENCY MEDICINE

## 2023-01-17 PROCEDURE — 6370000000 HC RX 637 (ALT 250 FOR IP)

## 2023-01-17 PROCEDURE — 6370000000 HC RX 637 (ALT 250 FOR IP): Performed by: INTERNAL MEDICINE

## 2023-01-17 PROCEDURE — 6360000002 HC RX W HCPCS: Performed by: INTERNAL MEDICINE

## 2023-01-17 PROCEDURE — 87804 INFLUENZA ASSAY W/OPTIC: CPT

## 2023-01-17 PROCEDURE — 36415 COLL VENOUS BLD VENIPUNCTURE: CPT

## 2023-01-17 PROCEDURE — 84484 ASSAY OF TROPONIN QUANT: CPT

## 2023-01-17 PROCEDURE — 2060000000 HC ICU INTERMEDIATE R&B

## 2023-01-17 PROCEDURE — 83036 HEMOGLOBIN GLYCOSYLATED A1C: CPT

## 2023-01-17 PROCEDURE — 87635 SARS-COV-2 COVID-19 AMP PRB: CPT

## 2023-01-17 PROCEDURE — 94640 AIRWAY INHALATION TREATMENT: CPT

## 2023-01-17 PROCEDURE — 2580000003 HC RX 258: Performed by: INTERNAL MEDICINE

## 2023-01-17 PROCEDURE — 99223 1ST HOSP IP/OBS HIGH 75: CPT | Performed by: INTERNAL MEDICINE

## 2023-01-17 PROCEDURE — 2700000000 HC OXYGEN THERAPY PER DAY

## 2023-01-17 PROCEDURE — 84145 PROCALCITONIN (PCT): CPT

## 2023-01-17 PROCEDURE — 71260 CT THORAX DX C+: CPT | Performed by: EMERGENCY MEDICINE

## 2023-01-17 PROCEDURE — 6360000002 HC RX W HCPCS

## 2023-01-17 PROCEDURE — 94761 N-INVAS EAR/PLS OXIMETRY MLT: CPT

## 2023-01-17 PROCEDURE — 81003 URINALYSIS AUTO W/O SCOPE: CPT

## 2023-01-17 PROCEDURE — 6370000000 HC RX 637 (ALT 250 FOR IP): Performed by: EMERGENCY MEDICINE

## 2023-01-17 PROCEDURE — 84443 ASSAY THYROID STIM HORMONE: CPT

## 2023-01-17 PROCEDURE — 93005 ELECTROCARDIOGRAM TRACING: CPT | Performed by: EMERGENCY MEDICINE

## 2023-01-17 PROCEDURE — 6360000002 HC RX W HCPCS: Performed by: EMERGENCY MEDICINE

## 2023-01-17 PROCEDURE — 71045 X-RAY EXAM CHEST 1 VIEW: CPT

## 2023-01-17 PROCEDURE — 6360000004 HC RX CONTRAST MEDICATION: Performed by: EMERGENCY MEDICINE

## 2023-01-17 RX ORDER — ATORVASTATIN CALCIUM 40 MG/1
40 TABLET, FILM COATED ORAL DAILY
Status: DISCONTINUED | OUTPATIENT
Start: 2023-01-17 | End: 2023-01-18 | Stop reason: HOSPADM

## 2023-01-17 RX ORDER — IPRATROPIUM BROMIDE AND ALBUTEROL SULFATE 2.5; .5 MG/3ML; MG/3ML
1 SOLUTION RESPIRATORY (INHALATION) 3 TIMES DAILY
Status: DISCONTINUED | OUTPATIENT
Start: 2023-01-17 | End: 2023-01-18 | Stop reason: HOSPADM

## 2023-01-17 RX ORDER — DEXTROSE MONOHYDRATE 100 MG/ML
INJECTION, SOLUTION INTRAVENOUS CONTINUOUS PRN
Status: DISCONTINUED | OUTPATIENT
Start: 2023-01-17 | End: 2023-01-18 | Stop reason: HOSPADM

## 2023-01-17 RX ORDER — SODIUM CHLORIDE 9 MG/ML
INJECTION, SOLUTION INTRAVENOUS CONTINUOUS
Status: ACTIVE | OUTPATIENT
Start: 2023-01-17 | End: 2023-01-17

## 2023-01-17 RX ORDER — PANTOPRAZOLE SODIUM 40 MG/1
40 TABLET, DELAYED RELEASE ORAL
Status: DISCONTINUED | OUTPATIENT
Start: 2023-01-17 | End: 2023-01-18 | Stop reason: HOSPADM

## 2023-01-17 RX ORDER — IPRATROPIUM BROMIDE AND ALBUTEROL SULFATE 2.5; .5 MG/3ML; MG/3ML
1 SOLUTION RESPIRATORY (INHALATION)
Status: DISCONTINUED | OUTPATIENT
Start: 2023-01-17 | End: 2023-01-17

## 2023-01-17 RX ORDER — POTASSIUM CHLORIDE 20 MEQ/1
40 TABLET, EXTENDED RELEASE ORAL PRN
Status: DISCONTINUED | OUTPATIENT
Start: 2023-01-17 | End: 2023-01-18 | Stop reason: HOSPADM

## 2023-01-17 RX ORDER — LEVOTHYROXINE SODIUM 0.12 MG/1
125 TABLET ORAL DAILY
Status: DISCONTINUED | OUTPATIENT
Start: 2023-01-17 | End: 2023-01-18 | Stop reason: HOSPADM

## 2023-01-17 RX ORDER — ACETAMINOPHEN 325 MG/1
650 TABLET ORAL EVERY 6 HOURS PRN
Status: DISCONTINUED | OUTPATIENT
Start: 2023-01-17 | End: 2023-01-18 | Stop reason: HOSPADM

## 2023-01-17 RX ORDER — ALBUTEROL SULFATE 2.5 MG/3ML
2.5 SOLUTION RESPIRATORY (INHALATION)
Status: DISCONTINUED | OUTPATIENT
Start: 2023-01-17 | End: 2023-01-17

## 2023-01-17 RX ORDER — PIRFENIDONE 267 MG/1
267 TABLET, FILM COATED ORAL 3 TIMES DAILY
Status: DISCONTINUED | OUTPATIENT
Start: 2023-01-17 | End: 2023-01-18 | Stop reason: HOSPADM

## 2023-01-17 RX ORDER — POTASSIUM CHLORIDE 7.45 MG/ML
10 INJECTION INTRAVENOUS ONCE
Status: COMPLETED | OUTPATIENT
Start: 2023-01-17 | End: 2023-01-17

## 2023-01-17 RX ORDER — POTASSIUM CHLORIDE 750 MG/1
40 TABLET, EXTENDED RELEASE ORAL ONCE
Status: COMPLETED | OUTPATIENT
Start: 2023-01-17 | End: 2023-01-17

## 2023-01-17 RX ORDER — MAGNESIUM SULFATE 1 G/100ML
1000 INJECTION INTRAVENOUS PRN
Status: DISCONTINUED | OUTPATIENT
Start: 2023-01-17 | End: 2023-01-18 | Stop reason: HOSPADM

## 2023-01-17 RX ORDER — MAGNESIUM SULFATE 1 G/100ML
1000 INJECTION INTRAVENOUS ONCE
Status: COMPLETED | OUTPATIENT
Start: 2023-01-17 | End: 2023-01-17

## 2023-01-17 RX ORDER — PREDNISONE 20 MG/1
40 TABLET ORAL DAILY
Status: DISCONTINUED | OUTPATIENT
Start: 2023-01-18 | End: 2023-01-18 | Stop reason: HOSPADM

## 2023-01-17 RX ORDER — SERTRALINE HYDROCHLORIDE 100 MG/1
200 TABLET, FILM COATED ORAL DAILY
Status: DISCONTINUED | OUTPATIENT
Start: 2023-01-17 | End: 2023-01-18 | Stop reason: HOSPADM

## 2023-01-17 RX ORDER — METHYLPREDNISOLONE SODIUM SUCCINATE 40 MG/ML
40 INJECTION, POWDER, LYOPHILIZED, FOR SOLUTION INTRAMUSCULAR; INTRAVENOUS EVERY 12 HOURS
Status: DISCONTINUED | OUTPATIENT
Start: 2023-01-17 | End: 2023-01-17

## 2023-01-17 RX ORDER — SODIUM CHLORIDE 0.9 % (FLUSH) 0.9 %
10 SYRINGE (ML) INJECTION PRN
Status: DISCONTINUED | OUTPATIENT
Start: 2023-01-17 | End: 2023-01-18 | Stop reason: HOSPADM

## 2023-01-17 RX ORDER — PREDNISONE 20 MG/1
40 TABLET ORAL DAILY
Status: DISCONTINUED | OUTPATIENT
Start: 2023-01-19 | End: 2023-01-17

## 2023-01-17 RX ORDER — NADOLOL 40 MG/1
20 TABLET ORAL DAILY
Status: DISCONTINUED | OUTPATIENT
Start: 2023-01-17 | End: 2023-01-18 | Stop reason: HOSPADM

## 2023-01-17 RX ORDER — ENOXAPARIN SODIUM 100 MG/ML
30 INJECTION SUBCUTANEOUS DAILY
Status: DISCONTINUED | OUTPATIENT
Start: 2023-01-17 | End: 2023-01-18 | Stop reason: HOSPADM

## 2023-01-17 RX ORDER — INSULIN LISPRO 100 [IU]/ML
0-4 INJECTION, SOLUTION INTRAVENOUS; SUBCUTANEOUS EVERY 4 HOURS
Status: DISCONTINUED | OUTPATIENT
Start: 2023-01-17 | End: 2023-01-18 | Stop reason: HOSPADM

## 2023-01-17 RX ORDER — ACETAMINOPHEN 650 MG/1
650 SUPPOSITORY RECTAL EVERY 6 HOURS PRN
Status: DISCONTINUED | OUTPATIENT
Start: 2023-01-17 | End: 2023-01-18 | Stop reason: HOSPADM

## 2023-01-17 RX ORDER — ONDANSETRON 2 MG/ML
4 INJECTION INTRAMUSCULAR; INTRAVENOUS ONCE
Status: COMPLETED | OUTPATIENT
Start: 2023-01-17 | End: 2023-01-17

## 2023-01-17 RX ORDER — HYDROXYZINE HYDROCHLORIDE 10 MG/1
10 TABLET, FILM COATED ORAL EVERY 8 HOURS PRN
Status: DISCONTINUED | OUTPATIENT
Start: 2023-01-17 | End: 2023-01-18 | Stop reason: HOSPADM

## 2023-01-17 RX ORDER — SODIUM CHLORIDE 0.9 % (FLUSH) 0.9 %
10 SYRINGE (ML) INJECTION EVERY 12 HOURS SCHEDULED
Status: DISCONTINUED | OUTPATIENT
Start: 2023-01-17 | End: 2023-01-18 | Stop reason: HOSPADM

## 2023-01-17 RX ORDER — ONDANSETRON 2 MG/ML
4 INJECTION INTRAMUSCULAR; INTRAVENOUS EVERY 6 HOURS PRN
Status: DISCONTINUED | OUTPATIENT
Start: 2023-01-17 | End: 2023-01-18 | Stop reason: HOSPADM

## 2023-01-17 RX ORDER — ALBUTEROL SULFATE 2.5 MG/3ML
2.5 SOLUTION RESPIRATORY (INHALATION) EVERY 4 HOURS PRN
Status: DISCONTINUED | OUTPATIENT
Start: 2023-01-17 | End: 2023-01-18 | Stop reason: HOSPADM

## 2023-01-17 RX ORDER — ONDANSETRON 2 MG/ML
INJECTION INTRAMUSCULAR; INTRAVENOUS
Status: COMPLETED
Start: 2023-01-17 | End: 2023-01-17

## 2023-01-17 RX ORDER — BENZONATATE 100 MG/1
100 CAPSULE ORAL EVERY 4 HOURS PRN
Status: DISCONTINUED | OUTPATIENT
Start: 2023-01-17 | End: 2023-01-18 | Stop reason: HOSPADM

## 2023-01-17 RX ORDER — MIRTAZAPINE 30 MG/1
30 TABLET, FILM COATED ORAL NIGHTLY
Status: DISCONTINUED | OUTPATIENT
Start: 2023-01-17 | End: 2023-01-18 | Stop reason: HOSPADM

## 2023-01-17 RX ORDER — POTASSIUM CHLORIDE 7.45 MG/ML
10 INJECTION INTRAVENOUS PRN
Status: DISCONTINUED | OUTPATIENT
Start: 2023-01-17 | End: 2023-01-18 | Stop reason: HOSPADM

## 2023-01-17 RX ORDER — 0.9 % SODIUM CHLORIDE 0.9 %
1000 INTRAVENOUS SOLUTION INTRAVENOUS ONCE
Status: COMPLETED | OUTPATIENT
Start: 2023-01-17 | End: 2023-01-17

## 2023-01-17 RX ORDER — ONDANSETRON 4 MG/1
4 TABLET, ORALLY DISINTEGRATING ORAL EVERY 8 HOURS PRN
Status: DISCONTINUED | OUTPATIENT
Start: 2023-01-17 | End: 2023-01-18 | Stop reason: HOSPADM

## 2023-01-17 RX ORDER — LORAZEPAM 0.5 MG/1
0.5 TABLET ORAL EVERY 6 HOURS PRN
Status: DISCONTINUED | OUTPATIENT
Start: 2023-01-17 | End: 2023-01-18 | Stop reason: HOSPADM

## 2023-01-17 RX ORDER — SODIUM CHLORIDE 9 MG/ML
INJECTION, SOLUTION INTRAVENOUS PRN
Status: DISCONTINUED | OUTPATIENT
Start: 2023-01-17 | End: 2023-01-18 | Stop reason: HOSPADM

## 2023-01-17 RX ORDER — TRAMADOL HYDROCHLORIDE 50 MG/1
50 TABLET ORAL EVERY 6 HOURS PRN
Status: DISCONTINUED | OUTPATIENT
Start: 2023-01-17 | End: 2023-01-18 | Stop reason: HOSPADM

## 2023-01-17 RX ORDER — POLYETHYLENE GLYCOL 3350 17 G/17G
17 POWDER, FOR SOLUTION ORAL DAILY PRN
Status: DISCONTINUED | OUTPATIENT
Start: 2023-01-17 | End: 2023-01-18 | Stop reason: HOSPADM

## 2023-01-17 RX ORDER — DILTIAZEM HYDROCHLORIDE 240 MG/1
240 CAPSULE, COATED, EXTENDED RELEASE ORAL DAILY
Status: DISCONTINUED | OUTPATIENT
Start: 2023-01-17 | End: 2023-01-18 | Stop reason: HOSPADM

## 2023-01-17 RX ADMIN — POTASSIUM CHLORIDE 10 MEQ: 7.46 INJECTION, SOLUTION INTRAVENOUS at 00:50

## 2023-01-17 RX ADMIN — ONDANSETRON 4 MG: 2 INJECTION INTRAMUSCULAR; INTRAVENOUS at 02:48

## 2023-01-17 RX ADMIN — ENOXAPARIN SODIUM 30 MG: 100 INJECTION SUBCUTANEOUS at 09:05

## 2023-01-17 RX ADMIN — MAGNESIUM SULFATE HEPTAHYDRATE 1000 MG: 1 INJECTION, SOLUTION INTRAVENOUS at 01:01

## 2023-01-17 RX ADMIN — PANTOPRAZOLE SODIUM 40 MG: 40 TABLET, DELAYED RELEASE ORAL at 09:05

## 2023-01-17 RX ADMIN — ONDANSETRON HYDROCHLORIDE 4 MG: 2 INJECTION, SOLUTION INTRAMUSCULAR; INTRAVENOUS at 02:48

## 2023-01-17 RX ADMIN — ATORVASTATIN CALCIUM 40 MG: 40 TABLET, FILM COATED ORAL at 09:05

## 2023-01-17 RX ADMIN — MAGNESIUM SULFATE HEPTAHYDRATE 1000 MG: 1 INJECTION, SOLUTION INTRAVENOUS at 03:28

## 2023-01-17 RX ADMIN — IOPAMIDOL 75 ML: 755 INJECTION, SOLUTION INTRAVENOUS at 01:16

## 2023-01-17 RX ADMIN — METHYLPREDNISOLONE SODIUM SUCCINATE 40 MG: 40 INJECTION, POWDER, FOR SOLUTION INTRAMUSCULAR; INTRAVENOUS at 09:06

## 2023-01-17 RX ADMIN — SODIUM CHLORIDE 1000 ML: 9 INJECTION, SOLUTION INTRAVENOUS at 00:15

## 2023-01-17 RX ADMIN — IPRATROPIUM BROMIDE AND ALBUTEROL SULFATE 1 AMPULE: 2.5; .5 SOLUTION RESPIRATORY (INHALATION) at 19:15

## 2023-01-17 RX ADMIN — LEVOTHYROXINE SODIUM 125 MCG: 125 TABLET ORAL at 09:05

## 2023-01-17 RX ADMIN — AZITHROMYCIN DIHYDRATE 500 MG: 500 INJECTION, POWDER, LYOPHILIZED, FOR SOLUTION INTRAVENOUS at 09:05

## 2023-01-17 RX ADMIN — IPRATROPIUM BROMIDE AND ALBUTEROL SULFATE 1 AMPULE: .5; 2.5 SOLUTION RESPIRATORY (INHALATION) at 08:35

## 2023-01-17 RX ADMIN — SODIUM CHLORIDE: 9 INJECTION, SOLUTION INTRAVENOUS at 11:11

## 2023-01-17 RX ADMIN — Medication 10 ML: at 21:04

## 2023-01-17 RX ADMIN — POTASSIUM CHLORIDE 40 MEQ: 750 TABLET, EXTENDED RELEASE ORAL at 00:51

## 2023-01-17 RX ADMIN — OXYMETAZOLINE HCL: 0.05 SPRAY NASAL at 00:16

## 2023-01-17 RX ADMIN — DILTIAZEM HYDROCHLORIDE 240 MG: 240 CAPSULE, COATED, EXTENDED RELEASE ORAL at 09:05

## 2023-01-17 RX ADMIN — IPRATROPIUM BROMIDE AND ALBUTEROL SULFATE 1 AMPULE: 2.5; .5 SOLUTION RESPIRATORY (INHALATION) at 15:31

## 2023-01-17 RX ADMIN — SERTRALINE 200 MG: 100 TABLET, FILM COATED ORAL at 09:05

## 2023-01-17 RX ADMIN — NADOLOL 20 MG: 40 TABLET ORAL at 09:05

## 2023-01-17 RX ADMIN — MIRTAZAPINE 30 MG: 30 TABLET, FILM COATED ORAL at 21:03

## 2023-01-17 ASSESSMENT — PAIN - FUNCTIONAL ASSESSMENT: PAIN_FUNCTIONAL_ASSESSMENT: NONE - DENIES PAIN

## 2023-01-17 ASSESSMENT — LIFESTYLE VARIABLES
HOW OFTEN DO YOU HAVE A DRINK CONTAINING ALCOHOL: NEVER
HOW OFTEN DO YOU HAVE A DRINK CONTAINING ALCOHOL: NEVER

## 2023-01-17 NOTE — PROGRESS NOTES
[facility] - ENT  PROGRESS  NOTE    Date of Service: 1/17/2023    ASSESSMENT/PLAN  Mu Brush is a very pleasant 69 y.o. female with epistaxis. She has a known septal perforation which is likely the site of bleeding. She was administered Afrin and pressure and bleeding stopped in the ER. ENT will evaluate the patient on Thursday when a provider is present. Please continue to do Afrin and pressure until that time. Humidified oxygen will be helpful.     [employer], DO

## 2023-01-17 NOTE — ED TRIAGE NOTES
Pt presents to ED via EMS c/o SOB and nosebleed. Pt wears 4-6 LPM via NC with several yards of tubing at home. Pt states she has felt more SOB today and has not been able to get the nosebleed to stop. Pt did not tolerate O2 removed, O2 saturation 78%. Returned with 6LPM via high flow, O2 saturation % with tubing in her mouth as nose is clamped for bleeding control.

## 2023-01-17 NOTE — H&P
Hospital Medicine History & Physical      PCP: GAEL PALUMBO MD    Date of Admission: 1/16/2023    Date of Service: Pt seen/examined on 1/17/2023     Chief Complaint:    Chief Complaint   Patient presents with    Shortness of Breath         History Of Present Illness:      The patient is a 67 y.o. female with pmhx of atrial fibrillation, HTN, CHF, DM, ILD, COOP, chronic hypoxic respiratory failure, depression, GERD, HLD, hypothyroidism,  presented to [facility] ED with complaint of increasing shortness of breath x 1 week with no associated cough or fever or sputum . Reports no chest pain but feels overall weak and tired . Activity limited with dyspnea.     Workup showed hypoxia requiring 10 L , AFibb with RVR and subsequent CTA chest neg for PE    Also complaining of nose bleed, seems to be recurrent issue for her. She has history of septal perforation 2/2 to chronic nasal cannula use. Has required nasal cauterization in the past.       Pt was recently admitted here for right tension pneumothorax and was transferred to [facility] for CT surgery consultation, improved and resoled without intervention at that time    Past Medical History:        Diagnosis Date    A-fib (HCC)     Anxiety     Cryptogenic organizing pneumonia (HCC)     Depression     GERD (gastroesophageal reflux disease)     Hyperlipidemia     On home oxygen therapy     uses O2 3L prn    Rash     Thyroid disease     hypothyroidism       Past Surgical History:        Procedure Laterality Date    ARM SURGERY Left 3/2/2020    LEFT ULNAR NERVE DECOMPRESSION AT THE ELBOW performed by Clifford Macedo MD at [facility] ASC OR    BRONCHOSCOPY N/A 6/27/2019    EBUS WF W/ANES. performed by Venkatesh Cool MD at [facility] ENDOSCOPY    BRONCHOSCOPY  6/27/2019    BRONCHOSCOPY/TRANSBRONCHIAL NEEDLE BIOPSY performed by Venkatesh Cool MD at [facility] ENDOSCOPY    BRONCHOSCOPY  6/27/2019    BRONCHOSCOPY/TRANSBRONCHIAL LUNG BIOPSY performed by Venkatesh Cool MD at [facility]  ENDOSCOPY    BRONCHOSCOPY  6/27/2019    BRONCHOSCOPY ALVEOLAR LAVAGE performed by Tania Dwyer MD at [facility] [address]  07/10/2019    BRONCHOSCOPY N/A 7/10/2019    BRONCHOSCOPY ALVEOLAR LAVAGE performed by Anisha Deutsch MD at [facility] Bilateral 08/06/2019    BRONCHOSCOPY N/A 8/6/2019    BRONCHOSCOPY ALVEOLAR LAVAGE performed by Reinaldo Sen MD at [facility] N/A 12/24/2019    BRONCHOSCOPY ALVEOLAR LAVAGE performed by Moi Mcdonnell MD at [facility] N/A 8/25/2022    COLONOSCOPY POLYPECTOMY SNARE/COLD BIOPSY performed by Lenora Medina MD at [address]  9/15/2022    CT GUIDED CHEST TUBE 9/15/2022 [address] SCAN    CT INSERT CATH PLEURA W IMAGE  11/28/2022    CT INSERT CATH PLEURA W IMAGE 11/28/2022 Wilber Reid MD [facility] CT SCAN    HYSTERECTOMY, TOTAL ABDOMINAL (CERVIX REMOVED)      partial       Medications Prior to Admission:    Prior to Admission medications    Medication Sig Start Date End Date Taking? Authorizing Provider   albuterol sulfate HFA (PROVENTIL;VENTOLIN;PROAIR) 108 (90 Base) MCG/ACT inhaler INHALE 2 PUFFS INTO THE LUNGS EVERY 6 HOURS AS NEEDED FOR WHEEZING 1/13/23   Ani Kraus MD   atorvastatin (LIPITOR) 40 MG tablet TAKE 1 TABLET EVERY DAY 12/22/22   Abhishek Huntley MD   loratadine-pseudoephedrine ([employer] ALLERGY RELIEF-D12) 5-120 MG per extended release tablet TAKE 1 TABLET BY MOUTH TWICE A DAY 12/22/22   Abhishek Huntley MD   traMADol (ULTRAM) 50 MG tablet Take 1-2 tablets by mouth every 6 hours as needed for Pain for up to 180 days.  Take lowest dose possible to manage pain 12/10/22 6/8/23  Abhishek Huntley MD   dilTIAZem (CARDIZEM CD) 240 MG extended release capsule Take 1 capsule by mouth daily 12/8/22   Raymond Ward MD   furosemide (LASIX) 40 MG tablet Take 40 mg by mouth daily    Historical Provider, MD   empagliflozin (JARDIANCE) 25 MG tablet Take 25 mg by mouth daily    Historical Provider, MD   empagliflozin (JARDIANCE) 25 MG tablet Take 25 mg by mouth daily    Historical Provider, MD   SITagliptin (JANUVIA) 100 MG tablet Take 100 mg by mouth daily    Historical Provider, MD   Multiple Vitamins-Minerals (WOMENS ONE DAILY PO) Take 1 tablet by mouth daily    Historical Provider, MD   nadolol (CORGARD) 20 MG tablet Take 20 mg by mouth daily    Historical Provider, MD   pantoprazole (PROTONIX) 40 MG tablet TAKE 1 TABLET EVERY MORNING BEFORE BREAKFAST 10/25/22   Shreya Grady MD   sertraline (ZOLOFT) 100 MG tablet TAKE 2 TABLETS EVERY DAY 10/25/22   Shreya Grady MD   mirtazapine (REMERON) 30 MG tablet TAKE 1 TABLET EVERY NIGHT 10/25/22   Shreya Grady MD   Pirfenidone 267 MG TABS pirfenidone daily for a week and then BID for a week and then back to TID. 10/22/22   Grace Colunga MD   LORazepam (ATIVAN) 0.5 MG tablet Take 0.5 mg by mouth every 6 hours as needed for Anxiety. Historical Provider, MD   traMADol (ULTRAM) 50 MG tablet Take 50 mg by mouth 2 times daily as needed for Pain.     Historical Provider, MD   levothyroxine (SYNTHROID) 125 MCG tablet Take 1 tablet by mouth Daily TAKE 1 TABLET BY MOUTH EVERY DAY 5/27/22   Shreya Grady MD   benzonatate (TESSALON) 200 MG capsule TAKE 1 CAPSULE BY MOUTH 3 TIMES A DAY AS NEEDED FOR COUGH 5/27/22   Shreya Grady MD   hydrOXYzine (ATARAX) 10 MG tablet Take 1 tablet by mouth every 8 hours as needed for Itching TAKE 1 TO 3 TABLETS BY MOUTH 3 TIMES DAILY AS NEEDED FOR ITCHING 5/8/22   PANDA Fischer CNP   albuterol (PROVENTIL) (2.5 MG/3ML) 0.083% nebulizer solution INHALE THE CONTENTS OF 1 VIAL VIA NEBULIZER EVERY 6 HOURS AS NEEDED FOR WHEEZING 2/7/22   Shreya Grady MD       Allergies:  Penicillins, Cefuroxime, Doxycycline, Imitrex [sumatriptan], Meperidine, Other, Sulfa antibiotics, Keflex [cephalexin], and Tape [adhesive tape]    Social History:  The patient currently lives at home with     TOBACCO:   reports that she has never smoked. She has never used smokeless tobacco.  ETOH:   reports no history of alcohol use. Family History:   Positive as follows:        Problem Relation Age of Onset    Diabetes Mother     High Blood Pressure Mother     Asthma Sister     Other Father        REVIEW OF SYSTEMS:       Constitutional: Negative for fever + weight loss, chronic fatigue  HENT: Negative for sore throat + nose bleed  Eyes: Negative for redness   Respiratory: +ve dyspnea, cough   Cardiovascular: Negative for chest pain   Gastrointestinal: Negative for vomiting, diarrhea   Genitourinary: Negative for hematuria   Musculoskeletal: Negative for arthralgias   Skin: Negative for rash   Neurological: Negative for syncope   Hematological: Negative for adenopathy   Psychiatric/Behavorial: Negative for anxiety    PHYSICAL EXAM:    /64   Pulse (!) 106   Temp 97.3 °F (36.3 °C) (Oral)   Resp 24   Ht 5' 2\" (1.575 m)   Wt 105 lb 9.6 oz (47.9 kg)   SpO2 94%   BMI 19.31 kg/m²        thin elderly female chronically ill appearing  Awake, alert and oriented. Appears to be not in any distress  Mucous Membranes:  Pink , anicteric  Neck: No JVD, no carotid bruit, no thyromegaly  Chest:  diminished in  kenny bases with scattered crackles ,  Cardiovascular:  RRR S1S2 heard, no murmurs or gallops  Abdomen:  Soft, undistended, non tender, no organomegaly, BS present  Extremities: No edema or cyanosis.  Distal pulses well felt  Neurological : no focal deficits with gen weakness       Lab Results   Component Value Date    WBC 8.5 01/16/2023    HGB 11.0 (L) 01/16/2023    HCT 34.8 (L) 01/16/2023    MCV 79.9 (L) 01/16/2023     01/16/2023     Lab Results   Component Value Date     01/16/2023    K 3.3 (L) 01/16/2023    CL 97 (L) 01/16/2023    CO2 36 (H) 01/16/2023    BUN 9 01/16/2023    CREATININE <0.5 (L) 01/16/2023    GLUCOSE 104 (H) 01/16/2023    CALCIUM 9.7 01/16/2023    PROT 7.2 01/16/2023 LABALBU 3.8 01/16/2023    BILITOT 0.5 01/16/2023    ALKPHOS 162 (H) 01/16/2023    AST 49 (H) 01/16/2023    ALT 28 01/16/2023    LABGLOM >60 01/16/2023    GFRAA >60 09/23/2022    AGRATIO 1.1 01/16/2023    GLOB 3.1 11/17/2021           CARDIAC ENZYMES  Recent Labs     01/16/23  2355   TROPONINI <0.01       U/A:    Lab Results   Component Value Date/Time    COLORU Yellow 01/17/2023 02:35 AM    WBCUA None seen 10/19/2022 08:30 PM    RBCUA None seen 10/19/2022 08:30 PM    MUCUS Rare 10/19/2022 08:30 PM    BACTERIA Rare 05/28/2022 06:39 AM    CLARITYU Clear 01/17/2023 02:35 AM    SPECGRAV <=1.005 01/17/2023 02:35 AM    LEUKOCYTESUR Negative 01/17/2023 02:35 AM    BLOODU Negative 01/17/2023 02:35 AM    GLUCOSEU Negative 01/17/2023 02:35 AM    AMORPHOUS Rare 05/28/2022 06:39 AM       ABG    Lab Results   Component Value Date/Time    LZB3GZK 20.4 06/26/2019 05:13 PM    BEART -2.9 06/26/2019 05:13 PM    A7GFIVZO 90.6 06/26/2019 05:13 PM    PHART 7.440 06/26/2019 05:13 PM    OIS3LNV 30.8 06/26/2019 05:13 PM    PO2ART 55.9 06/26/2019 05:13 PM    BRG1AGZ 21.4 06/26/2019 05:13 PM       CULTURES  COVID and Flu not detected     EKG:  I have reviewed the EKG with the following interpretation:    Sinus tachycardia with Premature supraventricular complexesRight ventricular hypertrophyVoltage criteria for left ventricular hypertrophyPossible Lateral infarct , age undeterminedPossible Inferior infarct , age undeterminedAbnormal ECGWhen compared with ECG of 03-DEC-2022 08:52,Significant changes have occurred           RADIOLOGY  CT CHEST PULMONARY EMBOLISM W CONTRAST   Final Result   No evidence of an acute pulmonary embolus. Severe/end-stage chronic interstitial lung disease with fibrosis,   honeycombing, subpleural blebs and traction bronchiectasis. XR CHEST PORTABLE   Final Result   Pulmonary change without acute cardiopulmonary process.                Pertinent previous results reviewed   Echocardiogram from 5/6/22 Summary   Technically difficult examination. Apical views are off axis secondary to pt   left ribs fracture. Left ventricular systolic function is normal with ejection fraction   estimated at 55%. No regional wall motion abnormalities. There is mild concentric left ventricular hypertrophy. Indeterminate left ventricular filling pressure. Systolic pulmonary artery pressure (SPAP) is normal estimated at 22 mmHg   (Right atrial pressure of 8 mmHg).        ASSESSMENT/PLAN:      #Acute on Chronic hypoxic respiratory failure   # ILD     -on 4-6 L baseline home oxygen, initially requiring 10 L--> now weaned down to baseline home 6 L   -CTA as above with no PE or infiltrates   -IV solumedrol started with HHN  -resumed home pirfenidone   -procalc pending  -sputum culture pending   -on zithromax   -pulmonology consulted       #Atrial fibrillation with RVR   - hx of chronic Afibb , not on AC with recurrent anemia and nose bleed and hemorrhoidal bleed   -rates initially in the 160s- improved spontaneously with improved hypoxia  -resumed on cardizem and corgard - back to NSR today  -cardiology consulted       #Chronic diastolic HF  -last echo as above   -holding lasix   -daily weights   -intake and output      #Hypomagnesemia   #Hypokalemia   -replacement protocol   -monitor with BMPs     #Epistaxis, recurrent   -hx of septal perforation 2/2 to NC use   -has required cauterizations in the past   -bleeding was controlled with afrin and nasal pressure  -ENT consulted        #Microcytic Anemia   -mild   -continue to monitor Hgb - add oral iron     #Generalized weakness   -IVF   -PT/OT   -fall precautions        DM type 2  -Has been taken off diuretics and diabetic meds for progressive weight loss in the last year with pulmonary cachexia   -SSI  -continue to monitor BG     HLD  -continue statin     Hypothyroidism  -continue synthroid    #Depression/Anxiety   -on zoloft high dose at 200 mg   -ativan prn         DVT Prophylaxis: Lovenox   Diet: ADULT DIET;  Regular; 4 carb choices (60 gm/meal)  Code Status: Full Code    Miroslava Dixon MD, 1/17/2023 9:52 AM

## 2023-01-17 NOTE — PROGRESS NOTES
Physical Therapy/Occupational Therapy    Attempted to see pt for PT/OT eval. Pt declining therapy at this time 2/2 \"feeling awful.\" Pt stated she is tired and having trouble with her breathing. Pt does not want to get OOB or participate with therapy at this time. RN aware. Will follow-up as PT/OT schedule and pt status permit.    No Charge.    Bina Sanon, PT, DPT, OMT-C # [id number]  Edgar Escalona OTR/L      Reattempted 1440. Patient stated \"I can't do it today\" due to fatigue but states she will participate tomorrow. Will reattempt 1/18/23.  Daisy Baca, OTR/L [id number]  Fabiola Jauregui, PT, DPT, [facility], [id number]

## 2023-01-17 NOTE — PROGRESS NOTES
Shift  and Admission assessment completed. See flow sheet. Medications given. Patient is A&O x4. Vitals are stable and Patient denies further needs at this time. Call light within reach.

## 2023-01-17 NOTE — DISCHARGE INSTRUCTIONS
Heart Failure Resources:    Heart Failure Interactive Workbook:   Go to www.[employer].com/aha-heartfailure for a Free Heart Failure Interactive Workbook provided by [employer]. This interactive workbook will provide information on Healthier Living with Heart Failure. Please copy and paste link into search bar. Use your mouse to scroll through the pages. HF [place] genny:   Heart Failure Free smart phone genny available for iPhone and Android download. Use your phone to track sodium intake, fluid intake, symptoms, and weight. Low Sodium Diet:  Go to www. [employer]. org website for [employer] which is Low Sodium! [employer] is a dialysis company, but this website offers free seasonal cookbooks. Each quarter, they will release 25-30 new recipes with a breakdown of calories, sodium, and glucose. Recipes:   Go to www.[employer].MOOI/recipes website for free recipes.

## 2023-01-17 NOTE — PLAN OF CARE
69 yoF who presented to [place] [address] w/ c/o increased SOB, gen weakness, fatigue and epistaxis. She was unable to get off of commode at home. She has Hx of chronic resp failure 2/2 pulm fibrosis, on 4-6L at home. She was requiring 10L at one point there but is being weaned back down to 6L. She is on humidified O2. She was found to be in a fib w/ RVR, hypo K and hypo Mg. HR improved somewhat w/ elec replacement. Epistaxis was controlled w/ afrin and pressure. She has a perforated nasal septum that Dr. Maude Brunner felt was chronic. CXr was clear. Admitted by COPD order set. Tele, serial trops. She did not require rate control agent at [facility]. Soft BP's at [facility] improved w/ 500 cc IVF. Holding non-rate control BP Rx. Gentle IVF w/ 12h expiration, hold Lasix. PT/OT, Pulm, ENT and Cards c/s. Afib w/ RVR  Acute on chronic resp failure  aeCOPD/ILD  Epistaxis  Hypo K and Mg. Gen weakness  DM2  Rapid COVID/FLU neg.

## 2023-01-17 NOTE — CONSULTS
Patient is being seen at the request of Hayley Shahid  for a consultation for shortness of breath hypoxia    HISTORY OF PRESENT ILLNESS:   69years old presented with shortness of breath for 1 days. Severe. Worse with exertion and better with resting. Cough with gray sputum. No hemoptysis. Associated with hypoxemia requiring up to 10 L O2. History of pulmonary fibrosis on chronic O2 4 to 6 L at home. Found to be in A. fib with RVR. Epistaxis controlled with Afrin. No smoking. PAST MEDICAL HISTORY:  Past Medical History:   Diagnosis Date    A-fib ([facility]     Anxiety     [facility]     Depression     GERD (gastroesophageal reflux disease)     Hyperlipidemia     On home oxygen therapy     uses O2 3L prn    Rash     Thyroid disease     hypothyroidism     PAST SURGICAL HISTORY:  Past Surgical History:   Procedure Laterality Date    ARM SURGERY Left 3/2/2020    LEFT ULNAR NERVE DECOMPRESSION AT THE ELBOW performed by Esperanza Child MD at [facility] N/A 6/27/2019    EBUS WF W/ANES.  performed by Laurel Peoples MD at [place] [address]  6/27/2019    BRONCHOSCOPY/TRANSBRONCHIAL NEEDLE BIOPSY performed by Laurel Peoples MD at [place] [address]  6/27/2019    BRONCHOSCOPY/TRANSBRONCHIAL LUNG BIOPSY performed by Laurel Peoples MD at [place] [address]  6/27/2019    BRONCHOSCOPY ALVEOLAR LAVAGE performed by Laurel Peoples MD at [place] [address]  07/10/2019    BRONCHOSCOPY N/A 7/10/2019    BRONCHOSCOPY ALVEOLAR LAVAGE performed by Junior Nori MD at [place] [address] Bilateral 08/06/2019    BRONCHOSCOPY N/A 8/6/2019    BRONCHOSCOPY ALVEOLAR LAVAGE performed by Merissa Francois MD at [place] [address] N/A 12/24/2019    BRONCHOSCOPY ALVEOLAR LAVAGE performed by Lianet Ruth MD at [address] [place] [address] 8/25/2022    COLONOSCOPY POLYPECTOMY SNARE/COLD BIOPSY performed by Georgie Olivas MD at [address]  9/15/2022    CT GUIDED CHEST TUBE 9/15/2022 [address] CT SCAN    CT INSERT CATH PLEURA W IMAGE  11/28/2022    CT INSERT CATH PLEURA W IMAGE 11/28/2022 Shadi Rowe MD [facility] CT SCAN    HYSTERECTOMY, TOTAL ABDOMINAL (CERVIX REMOVED)      partial       FAMILY HISTORY:  family history includes Asthma in her sister; Diabetes in her mother; High Blood Pressure in her mother; Other in her father. SOCIAL HISTORY:   reports that she has never smoked. She has never used smokeless tobacco.    Scheduled Meds:   atorvastatin  40 mg Oral Daily    dilTIAZem  240 mg Oral Daily    levothyroxine  125 mcg Oral Daily    mirtazapine  30 mg Oral Nightly    nadolol  20 mg Oral Daily    pantoprazole  40 mg Oral QAM AC    Pirfenidone  267 mg Oral TID    sertraline  200 mg Oral Daily    sodium chloride flush  10 mL IntraVENous 2 times per day    enoxaparin  30 mg SubCUTAneous Daily    azithromycin  500 mg IntraVENous Q24H    methylPREDNISolone  40 mg IntraVENous Q12H    Followed by    Lauren Rosado ON 1/19/2023] predniSONE  40 mg Oral Daily    insulin lispro  0-4 Units SubCUTAneous Q4H    ipratropium-albuterol  1 ampule Inhalation TID     Continuous Infusions:   dextrose      sodium chloride 75 mL/hr at 01/17/23 1111    sodium chloride       PRN Meds:  benzonatate, hydrOXYzine HCl, LORazepam, traMADol, glucose, dextrose bolus **OR** dextrose bolus, glucagon (rDNA), dextrose, sodium chloride flush, sodium chloride, ondansetron **OR** ondansetron, polyethylene glycol, acetaminophen **OR** acetaminophen, potassium chloride **OR** potassium alternative oral replacement **OR** potassium chloride, magnesium sulfate, albuterol    ALLERGIES:  Patient is allergic to penicillins, cefuroxime, doxycycline, imitrex [sumatriptan], meperidine, other, sulfa antibiotics, keflex [cephalexin], and tape [adhesive tape].     REVIEW OF SYSTEMS:  Constitutional: + chills   HENT: Negative for sore throat  Eyes: Negative for redness   Respiratory: +dyspnea, cough  Cardiovascular: Negative for chest pain  Gastrointestinal: Negative for vomiting, diarrhea   Genitourinary: Negative for hematuria   Musculoskeletal: + arthralgias   Skin: Negative for rash  Neurological: Negative for syncope  Hematological: Negative for adenopathy  Psychiatric/Behavorial: Negative for anxiety    PHYSICAL EXAM:  Blood pressure 117/64, pulse 100, temperature 97.3 °F (36.3 °C), temperature source Oral, resp. rate 20, height 5' 2\" (1.575 m), weight 105 lb 9.6 oz (47.9 kg), SpO2 97 %, not currently breastfeeding.' on 5 LPM   Gen: + Distress. Ill-appearing  Eyes: PERRL. No sclera icterus. No conjunctival injection. ENT: No discharge. Pharynx clear. Neck: Trachea midline. No obvious mass. Resp: + Accessory muscle use. Bilateral crackles. No wheezes. No rhonchi. No dullness on percussion. CV: Regular rate. irregular rhythm. No murmur or rub. No edema. GI: Non-tender. Non-distended. No hernia. Skin: Warm and dry. No nodule on exposed extremities. Lymph: No cervical LAD. No supraclavicular LAD. M/S: No cyanosis. No joint deformity. No clubbing. Neuro: Awake. Alert. Moves all four extremities. Psych: Oriented x 3. No anxiety.      LABS:  CBC:   Recent Labs     01/16/23 2355   WBC 8.5   HGB 11.0*   HCT 34.8*   MCV 79.9*        BMP:   Recent Labs     01/16/23 2355      K 3.3*   CL 97*   CO2 36*   BUN 9   CREATININE <0.5*     LIVER PROFILE:   Recent Labs     01/16/23 2355   AST 49*   ALT 28   BILITOT 0.5   ALKPHOS 162*     PT/INR:   Recent Labs     01/16/23 2355   PROTIME 14.6*   INR 1.15*     APTT:   Recent Labs     01/16/23 2355   APTT 29.7     UA:  Recent Labs     01/17/23  0235   COLORU Yellow   PHUR 6.5   CLARITYU Clear   SPECGRAV <=1.005   LEUKOCYTESUR Negative   UROBILINOGEN 0.2   BILIRUBINUR Negative   BLOODU Negative   GLUCOSEU Negative     No results for input(s): PHART, WWA1GJV, PO2ART in the last 72 hours. CT chest 1/17 imaging was reviewed by me and showed   No evidence of an acute pulmonary embolus.    Severe/end-stage chronic interstitial lung disease with fibrosis,   honeycombing, subpleural blebs and traction bronchiectasis      ASSESSMENT:  Acute hypoxemic respiratory failure  ILD on pirfenidone  COPD with acute exacerbation  Chronic hypoxemic respiratory failure-baseline 4 to 6 L O2  History of pneumothorax  Chronic A. fib with now RVR  Chronic diastolic CHF    PLAN:  Supplemental oxygen to maintain SaO2 >92%; wean as tolerated  Inhaled bronchodilators  Quick steroid taper  Okay to resume pirfenidone  Acapella and Mucinex  Cardiology following and managing A. fib  PT OT  Discussed with internal medicine

## 2023-01-17 NOTE — CARE COORDINATION
Case Management Assessment  Initial Evaluation      Patient Name: Maria Esther Obando  YOB: 1955  Diagnosis: Epistaxis [R04.0]  Hypokalemia [E87.6]  Hypomagnesemia [E83.42]  PAF (paroxysmal atrial fibrillation) ([facility] [I48.0]  Atrial fibrillation with RVR ([facility] [I48.91]  Date / Time: 1/16/2023 11:48 PM    Admission status/Date:  01/17/2023  Chart Reviewed: Yes      Patient Interviewed: Yes   Family Interviewed:  No      Hospitalization in the last 30 days:  No      Health Care Decision Maker :   Primary Decision Maker: Zainab Bang - Spouse - [phone]    Secondary Decision Maker: Rody Garza Child - [phone]    (CM - must 1st enter selection under Navigator - emergency contact- [place] Relationship and pick relationship)   Who do you trust or have selected to make healthcare decisions for you    Current PCP: Karan Dye MD    [facility] required for SNF : YES         3 night stay required - NA    ADLS  Support Systems/Care Needs:    Transportation: family    Meal Preparation: self/spouse    Housing  Living Arrangements: WC bound, Lives w/spouse  Steps: Ramp  Intent for return to present living arrangements: Yes  Identified Issues:     [address] with [address] : Yes - [facility]     Passport/Waiver  NO :             Phone Number:    Passport/Waiver Services:          Durable Medical Equiptment   DME Provider: Gerson Vasquez  Equipment:   Walker___Cane___RTS___ BSC___Shower Chair___Hospital Bed___W/C__X__Other_has 10 liter concentrator_______  02 at __3__Liter(s)---wears(frequency)_continuously______ HHN ___ CPAP___ BiPap___   N/A____      Home O2 Use :  Yes  - will need new walk test and orders if flow rate is over 4 for [place]  Informed of need for care provider to bring portable home O2 tank on day of discharge for nursing to connect prior to leaving:   Yes  Verbalized agreement/Understanding:   Yes    Community Service Affiliation  Dialysis:  No    Agency:  Location:  Dialysis Schedule:  Phone:   Fax: Other Community Services: (ex:PT/OT,Mental Health,Wound Clinic, Cardio/Pul [address])    DISCHARGE PLAN: Explained Case Management role/services. Frequent readmissions. Lives w/sp and has 24/7 assistance if needed. WC bound but does transfer self. + [employer] last order is for 3 liters continuously. She does have a 10 liter concentrator. Will need updated walk test and new O2 order. Resume HHC with [facility].  CM following

## 2023-01-17 NOTE — PROGRESS NOTES
MD from ENT called. If nose bleed persists continue pressure and Afrin. ENT will be available to see Patient on Thursday 1/19/23.

## 2023-01-17 NOTE — ED PROVIDER NOTES
[address] [facility]      Pt Name: Zeny Meraz  MRN: [id number]  [place] 1955  Date of evaluation: 1/16/2023  Provider: Sofie Gatica MD    CHIEF COMPLAINT       Chief Complaint   Patient presents with    Shortness of Breath         HISTORY OF PRESENT ILLNESS   (Location/Symptom, Timing/Onset, Context/Setting, Quality, Duration, Modifying Factors, Severity)  Note limiting factors. Zeny Meraz is a 69 y.o. female with past medical history of hypertension, hyperlipidemia, atrial fibrillation not on anticoagulation, CHF, diabetes, chronic interstitial lung disease and COPD on 4 to 6 L nasal cannula oxygen at home complicated by recent spontaneous pneumothorax last month here today for shortness of breath and nosebleed. Patient states this evening she began to feel short of breath. Notes she was on the toilet began to have a nosebleed. States she was too weak to get up off the toilet. States she has had little to no cough. No chest pain. No known fevers or chills. She was feeling more weak and short of breath than usual.  She notes she began to develop a nosebleed which is been a chronic issue for her as she does have a history of septal perforation as a result of her chronic underlying nasal cannula oxygen use and has had to require nasal cauterization previously. No recent injury or trauma. No fevers or chills. No abdominal pain, vomiting or diarrhea. No lower extremity swelling, redness or pain    HPI    Nursing Notes were reviewed. REVIEW OF SYSTEMS    (2-9 systems for level 4, 10 or more for level 5)     Review of Systems    Please see HPI for pertinent positive and negative review of system findings. A full 10 system ROS was performed and otherwise negative.         PAST MEDICAL HISTORY     Past Medical History:   Diagnosis Date    A-fib [facility])     Anxiety     Cryptogenic organizing pneumonia ([facility] [address]     Depression     GERD (gastroesophageal reflux disease)     Hyperlipidemia     On home oxygen therapy     uses O2 3L prn    Rash     Thyroid disease     hypothyroidism         SURGICAL HISTORY       Past Surgical History:   Procedure Laterality Date    ARM SURGERY Left 3/2/2020    LEFT ULNAR NERVE DECOMPRESSION AT THE ELBOW performed by Linnea Marino MD at [facility] N/A 6/27/2019    EBUS WF W/ANES.  [facility]  6/27/2019    BRONCHOSCOPY/TRANSBRONCHIAL NEEDLE BIOPSY performed [facility]  6/27/2019    BRONCHOSCOPY/TRANSBRONCHIAL LUNG BIOPSY performed [facility]  6/27/2019    BRONCHOSCOPY ALVEOLAR LAVAGE performed by [facility] MD at [facility]  07/10/2019    BRONCHOSCOPY N/A 7/10/2019    BRONCHOSCOPY ALVEOLAR LAVAGE performed [facility] Luisa [facility] at [facility] Bilateral 08/06/2019    BRONCHOSCOPY N/A 8/6/2019    BRONCHOSCOPY ALVEOLAR LAVAGE performed by [facility]ik King MD at [facility] N/A 12/24/2019    BRONCHOSCOPY ALVEOLAR LAVAGE performed by Yanick Sharp MD at [address] [facility] 8/25/2022    COLONOSCOPY POLYPECTOMY SNARE/COLD BIOPSY performed by Wade Pepe MD at [address] [place]  9/15/2022    CT GUIDED CHEST TUBE 9/15/2022 [facility] CT SCAN    CT INSERT CATH PLEURA W IMAGE  11/28/2022    CT INSERT CATH PLEURA W IMAGE 11/28/2022 Kemi Douglas MD [facility] CT SCAN    HYSTERECTOMY, TOTAL ABDOMINAL (CERVIX REMOVED)      partial         CURRENT MEDICATIONS       Previous Medications    ALBUTEROL (PROVENTIL) (2.5 MG/3ML) 0.083% NEBULIZER SOLUTION    INHALE THE CONTENTS OF 1 VIAL VIA NEBULIZER EVERY 6 HOURS AS NEEDED FOR WHEEZING    ALBUTEROL SULFATE HFA (PROVENTIL;VENTOLIN;PROAIR) 108 (90 BASE) MCG/ACT INHALER    INHALE 2 PUFFS INTO THE LUNGS EVERY 6 HOURS AS NEEDED FOR WHEEZING    ATORVASTATIN (LIPITOR) 40 MG TABLET    TAKE 1 TABLET EVERY DAY    BENZONATATE (TESSALON) 200 MG CAPSULE    TAKE 1 CAPSULE BY MOUTH 3 TIMES A DAY AS NEEDED FOR COUGH    DILTIAZEM (CARDIZEM CD) 240 MG EXTENDED RELEASE CAPSULE    Take 1 capsule by mouth daily    EMPAGLIFLOZIN (JARDIANCE) 25 MG TABLET    Take 25 mg by mouth daily    EMPAGLIFLOZIN (JARDIANCE) 25 MG TABLET    Take 25 mg by mouth daily    FUROSEMIDE (LASIX) 40 MG TABLET    Take 40 mg by mouth daily    HYDROXYZINE (ATARAX) 10 MG TABLET    Take 1 tablet by mouth every 8 hours as needed for Itching TAKE 1 TO 3 TABLETS BY MOUTH 3 TIMES DAILY AS NEEDED FOR ITCHING    LEVOTHYROXINE (SYNTHROID) 125 MCG TABLET    Take 1 tablet by mouth Daily TAKE 1 TABLET BY MOUTH EVERY DAY    LORATADINE-PSEUDOEPHEDRINE ([employer] ALLERGY RELIEF-D12) 5-120 MG PER EXTENDED RELEASE TABLET    TAKE 1 TABLET BY MOUTH TWICE A DAY    LORAZEPAM (ATIVAN) 0.5 MG TABLET    Take 0.5 mg by mouth every 6 hours as needed for Anxiety. MIRTAZAPINE (REMERON) 30 MG TABLET    TAKE 1 TABLET EVERY NIGHT    MULTIPLE VITAMINS-MINERALS (WOMENS ONE DAILY PO)    Take 1 tablet by mouth daily    NADOLOL (CORGARD) 20 MG TABLET    Take 20 mg by mouth daily    PANTOPRAZOLE (PROTONIX) 40 MG TABLET    TAKE 1 TABLET EVERY MORNING BEFORE BREAKFAST    PIRFENIDONE 267 MG TABS    pirfenidone daily for a week and then BID for a week and then back to TID. SERTRALINE (ZOLOFT) 100 MG TABLET    TAKE 2 TABLETS EVERY DAY    SITAGLIPTIN (JANUVIA) 100 MG TABLET    Take 100 mg by mouth daily    TRAMADOL (ULTRAM) 50 MG TABLET    Take 50 mg by mouth 2 times daily as needed for Pain. TRAMADOL (ULTRAM) 50 MG TABLET    Take 1-2 tablets by mouth every 6 hours as needed for Pain for up to 180 days.  Take lowest dose possible to manage pain       ALLERGIES     Penicillins, Cefuroxime, Doxycycline, Imitrex [sumatriptan], Meperidine, Other, Sulfa antibiotics, Keflex [cephalexin], and Tape Ayden Wagner tape]    FAMILY HISTORY       Family History   Problem Relation Age of Onset    Diabetes Mother     High Blood Pressure Mother     Asthma Sister     Other Father           SOCIAL HISTORY       Social History     Socioeconomic History    Marital status:      Spouse name: None    Number of children: 7    Years of education: None    Highest education level: None   Tobacco Use    Smoking status: Never    Smokeless tobacco: Never   Vaping Use    Vaping Use: Never used   Substance and Sexual Activity    Alcohol use: No    Drug use: No    Sexual activity: Not Currently     Social Determinants of Health     Transportation Needs: No Transportation Needs    Lack of Transportation (Medical): No    Lack of Transportation (Non-Medical): No       SCREENINGS               PHYSICAL EXAM    (up to 7 for level 4, 8 or more for level 5)     ED Triage Vitals [01/17/23 0001]   BP Temp Temp Source Heart Rate Resp SpO2 Height Weight   134/86 97.8 °F (36.6 °C) Temporal (!) 123 19 98 % 5' 3\" (1.6 m) 106 lb (48.1 kg)       Physical Exam    General appearance:  Cooperative. Conically ill-appearing skin:  Warm. Dry. Eye:  Extraocular movements intact. Ears, nose, mouth and throat:  Oral mucosa moist, anterior septal perforation. Small amount of bleeding in the anterior septum bilaterally. No other mass. Pharynx pink and moist with no exudate. Neck:  Trachea midline. Heart: Tachycardic but regular  Perfusion:  intact  Respiratory: Slight increased work of breathing but otherwise clear breath sounds bilaterally. Breath sounds are equal bilaterally  Abdominal:   Non distended. Nontender  Neurological:  Alert and oriented x 3. Moves all extremities spontaneously  Musculoskeletal:   Normal ROM, no deformities.   No lower extremity edema          Psychiatric:  Normal mood      DIAGNOSTIC RESULTS       Labs Reviewed   CBC WITH AUTO DIFFERENTIAL - Abnormal; Notable for the following components:       Result Value Hemoglobin 11.0 (*)     Hematocrit 34.8 (*)     MCV 79.9 (*)     MCH 25.2 (*)     RDW 18.3 (*)     All other components within normal limits   COMPREHENSIVE METABOLIC PANEL W/ REFLEX TO MG FOR LOW K - Abnormal; Notable for the following components:    Potassium reflex Magnesium 3.3 (*)     Chloride 97 (*)     CO2 36 (*)     Glucose 104 (*)     Creatinine <0.5 (*)     Alkaline Phosphatase 162 (*)     AST 49 (*)     All other components within normal limits   BLOOD GAS, VENOUS - Abnormal; Notable for the following components:    pCO2, Jasper 59.9 (*)     pO2, Jasper 18.1 (*)     HCO3, Venous 37.4 (*)     Base Excess, Jasper 10.8 (*)     All other components within normal limits   PROTIME-INR - Abnormal; Notable for the following components:    Protime 14.6 (*)     INR 1.15 (*)     All other components within normal limits   MAGNESIUM - Abnormal; Notable for the following components:    Magnesium 1.60 (*)     All other components within normal limits   BRAIN NATRIURETIC PEPTIDE - Abnormal; Notable for the following components:    Pro- (*)     All other components within normal limits   COVID-19, RAPID   RAPID INFLUENZA A/B ANTIGENS   LACTIC ACID   APTT   TROPONIN       Interpretation per the Radiologist below, if obtained/available at the time of this note:    CT CHEST PULMONARY EMBOLISM W CONTRAST   Final Result   No evidence of an acute pulmonary embolus. Severe/end-stage chronic interstitial lung disease with fibrosis,   honeycombing, subpleural blebs and traction bronchiectasis. XR CHEST PORTABLE   Final Result   Pulmonary change without acute cardiopulmonary process. All other labs/imaging were within normal range or not returned as of this dictation.     EMERGENCY DEPARTMENT COURSE and DIFFERENTIAL DIAGNOSIS/MDM:   Vitals:    Vitals:    01/17/23 0030 01/17/23 0100 01/17/23 0130 01/17/23 0158   BP: 139/83 (!) 97/52 (!) 87/50 (!) 102/57   Pulse: (!) 114 (!) 150 (!) 144 (!) 117   Resp: 23 (!) 41 23 23   Temp:       TempSrc:       SpO2: 100% 95% 100% 100%   Weight:       Height:           EKG *Interpreted by me*: EKG initially notable for likely sinus tachycardia with occasional supraventricular complexes, right ventricular perjury, left ventricular perjury. Inferior lateral infarct with T wave changes inferiorly. No ST elevation appreciable. Similar to prior and December though at that time she was in A. fib    Differential Diagnosis: Atrial fibrillation, pulmonary embolism, sepsis, pneumonia, COPD exacerbation    The patient presented to the emergency department today complaining of some shortness of breath. Was lightheaded, dizzy and had trouble getting up off the toilet. Also note was made of a nosebleed. Patient presented here with a heart rate ranging from 120s up to 160s. Would have runs of sinus rhythm only to be overtaken by what appeared to be atrial fibrillation. Known history of the same. Initially blood pressure was stable, did drop somewhat here briefly. Heart rate quite variable as was her actual rhythm. She initially had a mild nosebleed and does have a noted septal perforation but this was controlled with Afrin and nasal pressure. A broad work-up was performed to evaluate for any 1 of multiple potential underlying differential diagnoses set above. Chest x-ray showed chronic changes but nothing acute. Laboratory studies were notable for hypokalemia and hypomagnesemia. These electrolytes were replaced and may be contributing to her underlying arrhythmia. CT pulmonary angiogram was performed to rule out PE and there is no evidence of this or underlying pneumonia. Given her soft blood pressures and intermittent paroxysmal atrial fibrillation with rates up to the 160s and 170s I did consider possible digoxin loading as I was concerned her blood pressure would not tolerate beta-blockers or calcium channel blockers.     While I considered digoxin loading, the patient completed her magnesium and potassium infusions and heart rate now has remained fairly stable at approximately 115 bpm and what appears to be sinus rhythm. Blood pressure is improved as well. Still appears quite weak. No obvious infectious etiology to her symptoms. Plan to admit to the hospital for further cardiac rhythm control and evaluation. Medications and Route:   Medications   oxymetazoline (AFRIN) 0.05 % nasal spray (  Given 1/17/23 0016)   0.9 % sodium chloride bolus (0 mLs IntraVENous Stopped 1/17/23 0051)   potassium chloride (KLOR-CON M) extended release tablet 40 mEq (40 mEq Oral Given 1/17/23 0051)   potassium chloride 10 mEq/100 mL IVPB (Peripheral Line) (0 mEq IntraVENous Stopped 1/17/23 0205)   magnesium sulfate 1000 mg in dextrose 5% 100 mL IVPB (1,000 mg IntraVENous New Bag 1/17/23 0101)   iopamidol (ISOVUE-370) 76 % injection 75 mL (75 mLs IntraVENous Given 1/17/23 0116)       History From: Patient     Limitations to history : None    Chronic Conditions: Noted in HPI    CONSULTS: (Who and What was discussed)  IP CONSULT TO HOSPITALIST    Social Determinants : None    Records Reviewed : Inpatient Notes recent discharge summary after spontaneous pneumothorax in November/December. Disposition Considerations (Tests not ordered but considered, Shared Decision Making, Pt Expectation of Test or Tx.):     Viridiana White M.D., am the primary clinician of record. MDM      CONSULTS     IP CONSULT TO HOSPITALIST    Critical Care:     CRITICAL CARE TIME    I personally saw the patient and independently provided 30 minutes of non-concurrent critical care out of the total shared critical care time provided, excluding separately reportable procedures. There was a high probability of clinically significant/life threatening deterioration in the patient's condition which required my urgent intervention.   This time was spent reviewing the patient chart, interpreting diagnostic/laboratory data, transfusing IV potassium and IV magnesium for electrolyte abnormalities associated with underlying arrhythmia    REASSESSMENT          PROCEDURE     Unless otherwise noted below, none     Procedures      FINAL IMPRESSION      1. Epistaxis    2. PAF (paroxysmal atrial fibrillation) ([facility] [address]    3. Atrial fibrillation with RVR ([facility] [address]    4. Hypokalemia    5. Hypomagnesemia            DISPOSITION/PLAN   DISPOSITION Decision To Transfer 01/17/2023 01:56:36 AM        PATIENT REFERRED TO:  No follow-up provider specified. DISCHARGE MEDICATIONS:  New Prescriptions    No medications on file     Controlled Substances Monitoring:     RX Monitoring 5/27/2022   Attestation -   Periodic Controlled Substance Monitoring Possible medication side effects, risk of tolerance/dependence & alternative treatments discussed. ;No signs of potential drug abuse or diversion identified.;Obtaining appropriate analgesic effect of treatment. Chronic Pain > 50 MEDD Obtained or confirmed \"Consent for Opioid Use\" on file.    Chronic Pain > 80 MEDD -       (Please note that portions of this note were completed with a voice recognition program.  Efforts were made to edit the dictations but occasionally words are mis-transcribed.)    Nohemy Pate MD (electronically signed)  Attending Emergency Physician            Marcos Meade MD  01/17/23 7974

## 2023-01-17 NOTE — PROGRESS NOTES
Patient admitted to room 319 from [facility] ED. Patient oriented to room, call light, bed rails, phone, lights and bathroom. Patient instructed about the schedule of the day including: vital sign frequency, lab draws, possible tests, frequency of MD and staff rounds, daily weights, I &O's and prescribed diet. MXTEL 3 Telemetry box in place, patient aware of placement and reason. Bed locked, in lowest position, side rails up 2/4, call light within reach.        Recliner Assessment  Patient is not able to demonstrated the ability to move from a reclining position to an upright position within the recliner. however patient is alert, oriented and able to provide informed consent       4 Eyes Skin Assessment     The patient is being assess for   Admission    I agree that 2 RN's have performed a thorough Head to Toe Skin Assessment on the patient. ALL assessment sites listed below have been assessed.      Areas assessed for pressure by both nurses:   [x]   Head, Face, and Ears   [x]   Shoulders, Back, and Chest, Abdomen  [x]   Arms, Elbows, and Hands   [x]   Coccyx, Sacrum, and Ischium  [x]   Legs, Feet, and Heels        Skin Assessed Under all Medical Devices by both nurses:  O2 device tubing              All Mepilex Borders were peeled back and area peeked at by both nurses:  Yes  Please list where Mepilex Borders are located:  coccyx             **SHARE this note so that the co-signing nurse is able to place an eSignature**    Co-signer eSignature: {Esignature:[id number]}    Does the Patient have Skin Breakdown related to pressure?  No              Amos Prevention initiated:  No   Wound Care Orders initiated:  No      [facility] nurse consulted for Pressure Injury (Stage 3,4, Unstageable, DTI, NWPT, Complex wounds)and New or Established Ostomies:  No      Primary Nurse eSignature: Electronically signed by Eduar See RN on 1/17/23 at 7:06 AM EST

## 2023-01-17 NOTE — ED NOTES
Pt straight cathed for urine per request as unable to void on bedpan. Pt cleaned, Pure Wick placed, pt repositioned and provided with warm blanket. Pt denies further needs, resting comfortably at this time. Spouse remains at bedside, will continue to monitor. Rupert Willoughby RN.        Rocio Donahue RN  01/17/23 [id number]

## 2023-01-17 NOTE — ACP (ADVANCE CARE PLANNING)
Advance Care Planning   Healthcare Decision Maker:    Primary Decision Maker: Augusto Dotson - Spouse - [phone]    Secondary Decision Maker: Miah Catherine - Child - [phone]    Click here to complete Healthcare Decision Makers including selection of the Healthcare Decision Maker Relationship (ie \"Primary\"). Today we documented Decision Maker(s) consistent with ACP documents on file.    Full CODE

## 2023-01-17 NOTE — CONSULTS
[facility]  [phone]        Reason for Consultation/Chief Complaint: \"I have been having shortness of breath. \"    History of Present Illness: Luis Chin is a 69 y.o. patient who presented to the hospital with complaints of shortness of breath. She is on nasal O2 4-6 L/min but today she noted her spO2 wont go higher than 46% . She felt more short of breath. She also has cough with green sputum. No fever. She has nose bleed and her sputum is mixed with blood. Denies chest pain, edema, dizziness, palpitations and syncope. No GI symptoms. She is ambulatory. She has chronic afib but now has rapid ventricular response. H/O hyperlipidemia. Denies orthopnea PND or edema of legs. I have been asked to provide consultation regarding further management and testing. Past Medical History:   has a past medical history of A-fib ([facility] Anxiety, Cryptogenic organizing pneumonia ([facility] [address] Depression, GERD (gastroesophageal reflux disease), Hyperlipidemia, On home oxygen therapy, Rash, and Thyroid disease. Surgical History:   has a past surgical history that includes Cholecystectomy; bronchoscopy (N/A, 6/27/2019); bronchoscopy (6/27/2019); bronchoscopy (6/27/2019); bronchoscopy (6/27/2019); bronchoscopy (07/10/2019); bronchoscopy (N/A, 7/10/2019); Hysterectomy, total abdominal; bronchoscopy (Bilateral, 08/06/2019); bronchoscopy (N/A, 8/6/2019); bronchoscopy (N/A, 12/24/2019); Arm Surgery (Left, 3/2/2020); Colonoscopy (N/A, 8/25/2022); CT GUIDED CHEST TUBE (9/15/2022); and CT GUIDED PLEURAL DRAINAGE W CATH PERC (11/28/2022). Social History:   reports that she has never smoked. She has never used smokeless tobacco. She reports that she does not drink alcohol and does not use drugs. Family History:  family history includes Asthma in her sister; Diabetes in her mother; High Blood Pressure in her mother; Other in her father.      Home Medications:  Were reviewed and are listed in nursing record. and/or listed below  Prior to Admission medications    Medication Sig Start Date End Date Taking? Authorizing Provider   albuterol sulfate HFA (PROVENTIL;VENTOLIN;PROAIR) 108 (90 Base) MCG/ACT inhaler INHALE 2 PUFFS INTO THE LUNGS EVERY 6 HOURS AS NEEDED FOR WHEEZING 1/13/23   Ani Jacob MD   atorvastatin (LIPITOR) 40 MG tablet TAKE 1 TABLET EVERY DAY 12/22/22   Dale Bdeoya MD   loratadine-pseudoephedrine ([employer] ALLERGY RELIEF-D12) 5-120 MG per extended release tablet TAKE 1 TABLET BY MOUTH TWICE A DAY 12/22/22   Dale Bedoya MD   traMADol (ULTRAM) 50 MG tablet Take 1-2 tablets by mouth every 6 hours as needed for Pain for up to 180 days.  Take lowest dose possible to manage pain 12/10/22 6/8/23  Dale Bedoya MD   dilTIAZem (CARDIZEM CD) 240 MG extended release capsule Take 1 capsule by mouth daily 12/8/22   Ayde Salazar MD   furosemide (LASIX) 40 MG tablet Take 40 mg by mouth daily    Historical Provider, MD   empagliflozin (JARDIANCE) 25 MG tablet Take 25 mg by mouth daily    Historical Provider, MD   empagliflozin (JARDIANCE) 25 MG tablet Take 25 mg by mouth daily    Historical Provider, MD   SITagliptin (JANUVIA) 100 MG tablet Take 100 mg by mouth daily    Historical Provider, MD   Multiple Vitamins-Minerals (WOMENS ONE DAILY PO) Take 1 tablet by mouth daily    Historical Provider, MD   nadolol (CORGARD) 20 MG tablet Take 20 mg by mouth daily    Historical Provider, MD   pantoprazole (PROTONIX) 40 MG tablet TAKE 1 TABLET EVERY MORNING BEFORE BREAKFAST 10/25/22   Dale Bedoya MD   sertraline (ZOLOFT) 100 MG tablet TAKE 2 TABLETS EVERY DAY 10/25/22   Dale Bedoya MD   mirtazapine (REMERON) 30 MG tablet TAKE 1 TABLET EVERY NIGHT 10/25/22   Dale Bedoya MD   Pirfenidone 267 MG TABS pirfenidone daily for a week and then BID for a week and then back to TID. 10/22/22   Kenia Nguyen MD   LORazepam (ATIVAN) 0.5 MG tablet Take 0.5 mg by mouth every 6 hours as needed for Anxiety. Historical Provider, MD   traMADol (ULTRAM) 50 MG tablet Take 50 mg by mouth 2 times daily as needed for Pain.     Historical Provider, MD   levothyroxine (SYNTHROID) 125 MCG tablet Take 1 tablet by mouth Daily TAKE 1 TABLET BY MOUTH EVERY DAY 5/27/22   Willie Linda MD   benzonatate (TESSALON) 200 MG capsule TAKE 1 CAPSULE BY MOUTH 3 TIMES A DAY AS NEEDED FOR COUGH 5/27/22   Willie Linda MD   hydrOXYzine (ATARAX) 10 MG tablet Take 1 tablet by mouth every 8 hours as needed for Itching TAKE 1 TO 3 TABLETS BY MOUTH 3 TIMES DAILY AS NEEDED FOR ITCHING 5/8/22   PANDA Lake CNP   albuterol (PROVENTIL) (2.5 MG/3ML) 0.083% nebulizer solution INHALE THE CONTENTS OF 1 VIAL VIA NEBULIZER EVERY 6 HOURS AS NEEDED FOR WHEEZING 2/7/22   Willie Linda MD        Allergies:  Penicillins, Cefuroxime, Doxycycline, Imitrex [sumatriptan], Meperidine, Other, Sulfa antibiotics, Keflex [cephalexin], and Tape [adhesive tape]     Review of Systems:   12 point ROS negative in all areas as listed below except as in [place]  Constitutional, EENT,GI, , Musculoskeletal, skin, neurological, hematological, endocrine, Psychiatric    Physical Examination:    Vitals:    01/17/23 0703   BP: 117/64   Pulse: (!) 106   Resp: 24   Temp: 97.3 °F (36.3 °C)   SpO2: 94%    Weight: 105 lb 9.6 oz (47.9 kg)         General Appearance:  Alert, cooperative, no distress, appears older than stated age   Head:  Normocephalic, without obvious abnormality, atraumatic   Eyes:  PERRL, conjunctiva/corneas clear       Nose: Nares dry blood  no drainage or sinus tenderness   Throat: Lips, mucosa, and tongue normal   Neck: Supple, symmetrical, trachea midline, no adenopathy, thyroid: not enlarged, symmetric, no tenderness/mass/nodules, no carotid bruit or JVD       Lungs:   Clear to auscultation bilaterally, respirations labored   Chest Wall:  No tenderness or deformity   Heart:  regular rate and rhythm,  S1S2 normal, no murmur, rub or gallop Abdomen:   Soft, non-tender, bowel sounds active all four quadrants,  no masses, no organomegaly           Extremities: Extremities normal, atraumatic, no cyanosis or edema   Pulses: 2+ and symmetric   Skin: Skin color, texture, turgor normal, no rashes or lesions   Pysch: Normal mood and affect   Neurologic: Normal gross motor and sensory exam.         Labs  CBC:   Lab Results   Component Value Date/Time    WBC 8.5 01/16/2023 11:55 PM    RBC 4.35 01/16/2023 11:55 PM    HGB 11.0 01/16/2023 11:55 PM    HCT 34.8 01/16/2023 11:55 PM    MCV 79.9 01/16/2023 11:55 PM    RDW 18.3 01/16/2023 11:55 PM     01/16/2023 11:55 PM     CMP:    Lab Results   Component Value Date/Time     01/16/2023 11:55 PM    K 3.3 01/16/2023 11:55 PM    CL 97 01/16/2023 11:55 PM    CO2 36 01/16/2023 11:55 PM    BUN 9 01/16/2023 11:55 PM    CREATININE <0.5 01/16/2023 11:55 PM    GFRAA >60 09/23/2022 08:19 AM    AGRATIO 1.1 01/16/2023 11:55 PM    LABGLOM >60 01/16/2023 11:55 PM    GLUCOSE 104 01/16/2023 11:55 PM    PROT 7.2 01/16/2023 11:55 PM    CALCIUM 9.7 01/16/2023 11:55 PM    BILITOT 0.5 01/16/2023 11:55 PM    ALKPHOS 162 01/16/2023 11:55 PM    AST 49 01/16/2023 11:55 PM    ALT 28 01/16/2023 11:55 PM     PT/INR:  No results found for: PTINR  Lab Results   Component Value Date    CKTOTAL 23 (L) 07/10/2019    TROPONINI <0.01 01/16/2023       EKG:  I have reviewed EKG with the following interpretation:  Impression:  Sinus tachycardia with Premature supraventricular complexes  Right ventricular hypertrophy  Voltage criteria for left ventricular hypertrophy  Possible Lateral infarct , age undetermined  Possible Inferior infarct , age undetermined  Abnormal ECG  When compared with ECG of 03-DEC-2022 08:52,  Significant changes have occurred  CTPA 1.17.23   Impression   No evidence of an acute pulmonary embolus.        Severe/end-stage chronic interstitial lung disease with fibrosis,   honeycombing, subpleural blebs and traction bronchiectasis. Summary 5.9.22   Technically difficult examination. Apical views are off axis secondary to pt   left ribs fracture. Left ventricular systolic function is normal with ejection fraction   estimated at 55%. No regional wall motion abnormalities. There is mild concentric left ventricular hypertrophy. Indeterminate left ventricular filling pressure. Systolic pulmonary artery pressure (SPAP) is normal estimated at 22 mmHg   (Right atrial pressure of 8 mmHg). Signature      ------------------------------------------------------------------   Electronically signed by Alisia Mittal MD, [facility]   (Interpreting physician) on 05/09/2022 at 11:39 AM   ------------------------------------------------------------------     Assessment  Paroxysmal afib now NSR  Atrial Fibrillation CHADSVASC2 Score Stroke Risk:   69 y.o. 74-69 - 1   female Female - 1   CHF HX: No - 0   HTN HX: No - 0   Stroke/TIA/Thromboembolism No - 0   Vascular Disease HX: No - 0   Diabetes Mellitus Yes - 1   CHADSVASC 2 Score 0      Annual Stroke Risk 0.2% per year       Acute on chronic resp failure  Hypokalemia I will order replacement  Hypomagnesemia replace   epistaxis        Plan:    I had the opportunity to review the clinical symptoms and presentation of Armin Obando. I recommend that the patient undergo treatment with IV steroids antibiotics inhalers  She has h/o GI bleed but undefined lesion  anticoagulation has been considered unsafe in past      I will address the patient's cardiac risk factors and adjusted pharmacologic treatment as needed. In addition, I have reinforced the need for patient directed risk factor modification. Thank you for allowing to us to participate in the care or Armin Obando. Further evaluation will be based upon the patient's clinical course and testing results. All questions and concerns were addressed to the patient/family.  Alternatives to my treatment were discussed. The note was completed using EMR. Every effort was made to ensure accuracy; however, inadvertent computerized transcription errors may be present.

## 2023-01-18 VITALS
RESPIRATION RATE: 20 BRPM | HEIGHT: 62 IN | TEMPERATURE: 98 F | BODY MASS INDEX: 19.93 KG/M2 | WEIGHT: 108.3 LBS | DIASTOLIC BLOOD PRESSURE: 68 MMHG | HEART RATE: 68 BPM | SYSTOLIC BLOOD PRESSURE: 117 MMHG | OXYGEN SATURATION: 100 %

## 2023-01-18 LAB
A/G RATIO: 0.9 (ref 1.1–2.2)
ALBUMIN SERPL-MCNC: 3.1 G/DL (ref 3.4–5)
ALP BLD-CCNC: 121 U/L (ref 40–129)
ALT SERPL-CCNC: 17 U/L (ref 10–40)
ANION GAP SERPL CALCULATED.3IONS-SCNC: 8 MMOL/L (ref 3–16)
AST SERPL-CCNC: 29 U/L (ref 15–37)
BASOPHILS ABSOLUTE: 0 K/UL (ref 0–0.2)
BASOPHILS RELATIVE PERCENT: 0.5 %
BILIRUB SERPL-MCNC: 0.3 MG/DL (ref 0–1)
BUN BLDV-MCNC: 7 MG/DL (ref 7–20)
CALCIUM SERPL-MCNC: 8.2 MG/DL (ref 8.3–10.6)
CHLORIDE BLD-SCNC: 104 MMOL/L (ref 99–110)
CO2: 30 MMOL/L (ref 21–32)
CREAT SERPL-MCNC: <0.5 MG/DL (ref 0.6–1.2)
EOSINOPHILS ABSOLUTE: 0.2 K/UL (ref 0–0.6)
EOSINOPHILS RELATIVE PERCENT: 2.2 %
ESTIMATED AVERAGE GLUCOSE: 93.9 MG/DL
GFR SERPL CREATININE-BSD FRML MDRD: >60 ML/MIN/{1.73_M2}
GLUCOSE BLD-MCNC: 102 MG/DL (ref 70–99)
GLUCOSE BLD-MCNC: 104 MG/DL (ref 70–99)
GLUCOSE BLD-MCNC: 110 MG/DL (ref 70–99)
GLUCOSE BLD-MCNC: 114 MG/DL (ref 70–99)
HBA1C MFR BLD: 4.9 %
HCT VFR BLD CALC: 28.2 % (ref 36–48)
HEMOGLOBIN: 9 G/DL (ref 12–16)
LYMPHOCYTES ABSOLUTE: 2 K/UL (ref 1–5.1)
LYMPHOCYTES RELATIVE PERCENT: 23.7 %
MAGNESIUM: 1.9 MG/DL (ref 1.8–2.4)
MCH RBC QN AUTO: 25.9 PG (ref 26–34)
MCHC RBC AUTO-ENTMCNC: 32 G/DL (ref 31–36)
MCV RBC AUTO: 81 FL (ref 80–100)
MONOCYTES ABSOLUTE: 0.7 K/UL (ref 0–1.3)
MONOCYTES RELATIVE PERCENT: 8.6 %
NEUTROPHILS ABSOLUTE: 5.4 K/UL (ref 1.7–7.7)
NEUTROPHILS RELATIVE PERCENT: 65 %
PDW BLD-RTO: 18.8 % (ref 12.4–15.4)
PERFORMED ON: ABNORMAL
PLATELET # BLD: 141 K/UL (ref 135–450)
PMV BLD AUTO: 8.1 FL (ref 5–10.5)
POTASSIUM REFLEX MAGNESIUM: 2.8 MMOL/L (ref 3.5–5.1)
RBC # BLD: 3.48 M/UL (ref 4–5.2)
SODIUM BLD-SCNC: 142 MMOL/L (ref 136–145)
TOTAL PROTEIN: 6.5 G/DL (ref 6.4–8.2)
WBC # BLD: 8.4 K/UL (ref 4–11)

## 2023-01-18 PROCEDURE — 97535 SELF CARE MNGMENT TRAINING: CPT

## 2023-01-18 PROCEDURE — 83735 ASSAY OF MAGNESIUM: CPT

## 2023-01-18 PROCEDURE — 97165 OT EVAL LOW COMPLEX 30 MIN: CPT

## 2023-01-18 PROCEDURE — 94640 AIRWAY INHALATION TREATMENT: CPT

## 2023-01-18 PROCEDURE — 97530 THERAPEUTIC ACTIVITIES: CPT

## 2023-01-18 PROCEDURE — 6360000002 HC RX W HCPCS: Performed by: INTERNAL MEDICINE

## 2023-01-18 PROCEDURE — 2580000003 HC RX 258: Performed by: INTERNAL MEDICINE

## 2023-01-18 PROCEDURE — 85025 COMPLETE CBC W/AUTO DIFF WBC: CPT

## 2023-01-18 PROCEDURE — 97161 PT EVAL LOW COMPLEX 20 MIN: CPT

## 2023-01-18 PROCEDURE — 6370000000 HC RX 637 (ALT 250 FOR IP): Performed by: INTERNAL MEDICINE

## 2023-01-18 PROCEDURE — 87086 URINE CULTURE/COLONY COUNT: CPT

## 2023-01-18 PROCEDURE — 36415 COLL VENOUS BLD VENIPUNCTURE: CPT

## 2023-01-18 PROCEDURE — 94761 N-INVAS EAR/PLS OXIMETRY MLT: CPT

## 2023-01-18 PROCEDURE — 87077 CULTURE AEROBIC IDENTIFY: CPT

## 2023-01-18 PROCEDURE — 2700000000 HC OXYGEN THERAPY PER DAY

## 2023-01-18 PROCEDURE — 87186 SC STD MICRODIL/AGAR DIL: CPT

## 2023-01-18 PROCEDURE — 80053 COMPREHEN METABOLIC PANEL: CPT

## 2023-01-18 RX ORDER — PREDNISONE 10 MG/1
TABLET ORAL
Qty: 18 TABLET | Refills: 0 | Status: SHIPPED | OUTPATIENT
Start: 2023-01-18

## 2023-01-18 RX ORDER — FUROSEMIDE 40 MG/1
20 TABLET ORAL SEE ADMIN INSTRUCTIONS
Qty: 60 TABLET | Refills: 3
Start: 2023-01-18

## 2023-01-18 RX ORDER — POTASSIUM CHLORIDE 20 MEQ/1
40 TABLET, EXTENDED RELEASE ORAL
Status: DISCONTINUED | OUTPATIENT
Start: 2023-01-18 | End: 2023-01-18 | Stop reason: HOSPADM

## 2023-01-18 RX ORDER — NADOLOL 20 MG/1
20 TABLET ORAL DAILY
Qty: 30 TABLET | Refills: 3 | Status: SHIPPED | OUTPATIENT
Start: 2023-01-18

## 2023-01-18 RX ORDER — AZITHROMYCIN 250 MG/1
250 TABLET, FILM COATED ORAL DAILY
Qty: 3 TABLET | Refills: 0 | Status: SHIPPED | OUTPATIENT
Start: 2023-01-18 | End: 2023-01-21

## 2023-01-18 RX ADMIN — POTASSIUM CHLORIDE 10 MEQ: 7.46 INJECTION, SOLUTION INTRAVENOUS at 06:41

## 2023-01-18 RX ADMIN — SODIUM CHLORIDE: 9 INJECTION, SOLUTION INTRAVENOUS at 06:39

## 2023-01-18 RX ADMIN — LEVOTHYROXINE SODIUM 125 MCG: 125 TABLET ORAL at 06:29

## 2023-01-18 RX ADMIN — IPRATROPIUM BROMIDE AND ALBUTEROL SULFATE 1 AMPULE: 2.5; .5 SOLUTION RESPIRATORY (INHALATION) at 07:40

## 2023-01-18 RX ADMIN — NADOLOL 20 MG: 40 TABLET ORAL at 09:03

## 2023-01-18 RX ADMIN — PANTOPRAZOLE SODIUM 40 MG: 40 TABLET, DELAYED RELEASE ORAL at 06:29

## 2023-01-18 RX ADMIN — Medication 10 ML: at 09:00

## 2023-01-18 RX ADMIN — ACETAMINOPHEN 650 MG: 325 TABLET ORAL at 02:06

## 2023-01-18 RX ADMIN — SODIUM CHLORIDE: 9 INJECTION, SOLUTION INTRAVENOUS at 06:34

## 2023-01-18 RX ADMIN — SERTRALINE 200 MG: 100 TABLET, FILM COATED ORAL at 09:03

## 2023-01-18 RX ADMIN — ATORVASTATIN CALCIUM 40 MG: 40 TABLET, FILM COATED ORAL at 09:03

## 2023-01-18 RX ADMIN — DILTIAZEM HYDROCHLORIDE 240 MG: 240 CAPSULE, COATED, EXTENDED RELEASE ORAL at 09:04

## 2023-01-18 RX ADMIN — AZITHROMYCIN DIHYDRATE 500 MG: 500 INJECTION, POWDER, LYOPHILIZED, FOR SOLUTION INTRAVENOUS at 09:03

## 2023-01-18 RX ADMIN — POTASSIUM CHLORIDE 40 MEQ: 1500 TABLET, EXTENDED RELEASE ORAL at 09:03

## 2023-01-18 RX ADMIN — PREDNISONE 40 MG: 20 TABLET ORAL at 09:03

## 2023-01-18 RX ADMIN — ENOXAPARIN SODIUM 30 MG: 100 INJECTION SUBCUTANEOUS at 09:03

## 2023-01-18 ASSESSMENT — PAIN SCALES - GENERAL: PAINLEVEL_OUTOF10: 6

## 2023-01-18 ASSESSMENT — PAIN DESCRIPTION - LOCATION: LOCATION: HEAD

## 2023-01-18 ASSESSMENT — PAIN DESCRIPTION - DESCRIPTORS: DESCRIPTORS: ACHING

## 2023-01-18 ASSESSMENT — PAIN DESCRIPTION - ORIENTATION: ORIENTATION: MID

## 2023-01-18 ASSESSMENT — PAIN - FUNCTIONAL ASSESSMENT: PAIN_FUNCTIONAL_ASSESSMENT: ACTIVITIES ARE NOT PREVENTED

## 2023-01-18 NOTE — PROGRESS NOTES
Inpatient Physical Therapy Evaluation and Treatment    Unit: PCU  Date:  1/18/2023  Patient Name:    Sagar Mathews  Admitting diagnosis:  Epistaxis [R04.0]  Hypokalemia [E87.6]  Hypomagnesemia [E83.42]  PAF (paroxysmal atrial fibrillation) ([facility] [address] [I48.0]  Atrial fibrillation with RVR ([facility] [I48.91]  Admit Date:  1/16/2023  Precautions/Restrictions/WB Status/ Lines/ Wounds/ Oxygen: Fall risk, Bed/chair alarm, Lines -IV and Supplemental O2 (5L), Telemetry, and Continuous pulse oximetry    Treatment Time:  9:40 - 10:15  Treatment Number:  1   Timed Code Treatment Minutes: 25 minutes  Total Treatment Minutes:  35  minutes    Patient Goals for Therapy: \" go home today \"          Discharge Recommendations: Home 24 hr assist and with home PT  and Home PT  DME needs for discharge: Needs Met       Therapy recommendation for EMS Transport: can transport by wheelchair    Therapy recommendations for staff:   Stand by assist with use of No AD and gait belt for all transfers to/from [facility]  to/from chair - watch O2 stats    History of Present Illness:   Per Dr. Leidy Allen:  69 yoF who presented to [place] [address] w/ c/o increased SOB, gen weakness, fatigue and epistaxis. She was unable to get off of commode at home. She has Hx of chronic resp failure 2/2 pulm fibrosis, on 4-6L at home. She was requiring 10L at one point there but is being weaned back down to 6L. She is on humidified O2. She was found to be in a fib w/ RVR, hypo K and hypo Mg. HR improved somewhat w/ elec replacement. Epistaxis was controlled w/ afrin and pressure. She has a perforated nasal septum that Dr. Renetta Thornton felt was chronic. CXr was clear. Admitted by COPD order set. Tele, serial trops. She did not require rate control agent at [facility]. Soft BP's at [facility] improved w/ 500 cc IVF. Holding non-rate control BP Rx. Gentle IVF w/ 12h expiration, hold Lasix. PT/OT, Pulm, ENT and Cards c/s.       Afib w/ RVR  Acute on chronic resp failure  aeCOPD/ILD  Epistaxis  Hypo K and Mg. Gen weakness  DM2  Rapid COVID/FLU neg. Home Health S4 Level Recommendation:  Level 1 Standard  AM-PAC Mobility Score       AM-PAC Inpatient Mobility without Stair Climbing Raw Score : 18    Preadmission Environment    Pt. Lives with spouse & 1 dog  Home environment:   1 story home   Steps to enter first floor: Ramp  Steps to second floor: N/A  Bathroom: Walk in [employer], [employer], Shower Chair  and comfort height toilet without grab bars   Equipment owned: rollator, manual WC, Shower Chair, BSC, lift chair, home O2 (4L -  goes up to 6L O2 when moving ) continuous, pulse ox and nebulizer      Preadmission Status:  Pt. Able to drive: No,  drives   Pt Fully independent with ADLs: Needs assist with ADLs  Pt. Required assistance from family for: Patient's spouse does cooking, laundry, cleaning. pt's spouse completes the yard work   Pt. Fully independent for transfers with use of manual WC. Has been doing pivot transfers WC to/from toilet  History of falls: No  Spouse works at night. Dtr comes 3 days per week to assist pt with bathing, meal prep, and laundry. Would like to set up home PT upon going home. Pain   No  Location:   Rating: NA /10  Pain Medicine Status: Denies need    Cognition    A&O x4   Able to follow 2 step commands    Subjective  Patient lying supine in bed with no family present. Pt agreeable to this PT eval & tx. Upper Extremity ROM/Strength  Please see OT evaluation. Lower Extremity ROM / Strength   AROM WFL: Yes  ROM limitations:     BLE strength impaired, but not formally assessed with MMT.   BLE grossly 3+/5    Lower Extremity Sensation    WFL    Lower Extremity Proprioception:   NT    Coordination and Tone  NT    Balance  Sitting:  Good ; Supervision  Comments: EOB    Standing: Fair +; SBA  Comments:     Bed Mobility   Supine to Sit:    Not Tested  Sit to Supine:   Supervision  Rolling:   Not Tested  Scooting in sitting: Supervision  Scooting in supine:  Not Tested    Transfer Training     Sit to stand:   SBA  Stand to sit:   SBA  Bed to Chair:   SBA with use of No AD and gait belt    Gait gait completed as indicated below  Distance:      3 ft  Deviations (firm surface/linoleum):  decreased mike, narrow BRY, forward flexed posture, step through pattern, and decreased step length bilaterally  Assistive Device Used:    No AD and gait belt  Level of Assist:    SBA  Comment: holding onto bed rails, SOB, no LOB    Stair Training deferred, pt does not have stairs in home environment    Activity Tolerance   Pt completed therapy session with SOB noted with all transfers    Seated on BSC:  SpO2: 92% on 5L O2  HR: 80 bpm    After stand from [facility] to perform pericare:  SpO2: 81% on 5L O2 (recovered to 93% on 5L with seated rest and PLB)   HR: 89 bpm    At end of session: SpO2: 95% on 5L O2    Positioning Needs   Pt in bed, alarm set, positioned in proper neutral alignment and pressure relief provided. Call light provided and all needs within reach    Exercises Initiated  Dimitry deferred secondary to treatment focus on functional mobility  NA    Other  None. Patient/Family Education   Pt educated on role of inpatient PT, POC, importance of continued activity, DC recommendations, safety awareness, transfer techniques, pursed lip breathing, pacing activity, and calling for assist with mobility. Assessment  Pt seen for Physical Therapy evaluation in acute care setting. Pt demonstrated decreased Activity tolerance, Balance, Safety, and Strength as well as decreased independence with Ambulation, Bed Mobility , and Transfers. Recommending Home 24 hr assist and with home PT upon discharge as patient functioning close to baseline level and would benefit from continued therapy services    Goals : To be met in 3 visits:  1). Independent with LE Ex x 10 reps    To be met in 6 visits:  1). Supine to/from sit: Independent  2). Sit to/from stand: Independent  3).   Bed to chair: Independent  4). Gait: Ambulate  25 ft.   with  Modified Independent and use of rolling walker (RW)  5). Tolerate B LE exercises 3 sets of 10-15 reps    Rehabilitation Potential: Good  Strengths for achieving goals include:   Pt motivated, PLOF, Family Support, and Pt cooperative   Barriers to achieving goals include:    Weakness    Plan    To be seen 3-5 x / week  while in acute care setting for therapeutic exercises, bed mobility, transfers, progressive gait training, balance training, and family/patient education. Signature: Baldo Del Toro, PT, DPT, OMT-C  #[id number]      If patient discharges from this facility prior to next visit, this note will serve as the Discharge Summary.

## 2023-01-18 NOTE — PROGRESS NOTES
IM Progress Note    Admit Date:  1/16/2023  1    Interval history:  hypoxia , pulm fibrosis   Subjective:  Ms. Obando seen up in bed, feels ok on home o2 of 5 L  Still desaturates with any activity but this is her baseline  No cough  Reports very minimal activity at home      Objective:   /68   Pulse 68   Temp 98 °F (36.7 °C) (Axillary)   Resp 20   Ht 5' 2\" (1.575 m)   Wt 108 lb 4.8 oz (49.1 kg)   SpO2 100%   BMI 19.81 kg/m²     Intake/Output Summary (Last 24 hours) at 1/18/2023 1000  Last data filed at 1/17/2023 1200  Gross per 24 hour   Intake 120 ml   Output --   Net 120 ml       Physical Exam:   thin elderly female chronically ill appearing  Awake, alert and oriented. Appears to be not in any distress  Mucous Membranes:  Pink , anicteric  Neck: No JVD, no carotid bruit, no thyromegaly  Chest:  improved kenny air entry   kenny bases with scattered chronic crackles ,  Cardiovascular:  RRR S1S2 heard, no murmurs or gallops  Abdomen:  Soft, undistended, non tender, no organomegaly, BS present  Extremities: No edema or cyanosis.  Distal pulses well felt  Neurological : no focal deficits with gen weakness       Medications:   Scheduled Medications:    potassium chloride  40 mEq Oral Q2H    atorvastatin  40 mg Oral Daily    dilTIAZem  240 mg Oral Daily    levothyroxine  125 mcg Oral Daily    mirtazapine  30 mg Oral Nightly    nadolol  20 mg Oral Daily    pantoprazole  40 mg Oral QAM AC    Pirfenidone  267 mg Oral TID    sertraline  200 mg Oral Daily    sodium chloride flush  10 mL IntraVENous 2 times per day    enoxaparin  30 mg SubCUTAneous Daily    azithromycin  500 mg IntraVENous Q24H    insulin lispro  0-4 Units SubCUTAneous Q4H    ipratropium-albuterol  1 ampule Inhalation TID    predniSONE  40 mg Oral Daily     I   dextrose      sodium chloride 5 mL/hr at 01/18/23 0639     benzonatate, hydrOXYzine HCl, LORazepam, traMADol, glucose, dextrose bolus **OR** dextrose bolus, glucagon (rDNA), dextrose, sodium chloride flush, sodium chloride, ondansetron **OR** ondansetron, polyethylene glycol, acetaminophen **OR** acetaminophen, potassium chloride **OR** potassium alternative oral replacement **OR** potassium chloride, magnesium sulfate, albuterol    Lab Data:  Recent Labs     01/16/23 2355 01/18/23  0452   WBC 8.5 8.4   HGB 11.0* 9.0*   HCT 34.8* 28.2*   MCV 79.9* 81.0    141     Recent Labs     01/16/23  2355 01/18/23  0452    142   K 3.3* 2.8*   CL 97* 104   CO2 36* 30   BUN 9 7   CREATININE <0.5* <0.5*     Recent Labs     01/17/23  0949 01/17/23  1538   TROPONINI 0.01 <0.01       Coagulation:   Lab Results   Component Value Date/Time    INR 1.15 01/16/2023 11:55 PM    APTT 29.7 01/16/2023 11:55 PM     Cardiac markers:   Lab Results   Component Value Date/Time    CKTOTAL 23 07/10/2019 04:11 PM    TROPONINI <0.01 01/17/2023 03:38 PM         Lab Results   Component Value Date    ALT 17 01/18/2023    AST 29 01/18/2023    ALKPHOS 121 01/18/2023    BILITOT 0.3 01/18/2023       Lab Results   Component Value Date    INR 1.15 (H) 01/16/2023    INR 1.28 (H) 11/28/2022    INR 1.21 (H) 09/15/2022    PROTIME 14.6 (H) 01/16/2023    PROTIME 15.8 (H) 11/28/2022    PROTIME 15.2 (H) 09/15/2022       CULTURES  COVID and Flu not detected      EKG:  I have reviewed the EKG with the following interpretation:     Sinus tachycardia with Premature supraventricular complexesRight ventricular hypertrophyVoltage criteria for left ventricular hypertrophyPossible Lateral infarct , age undeterminedPossible Inferior infarct , age undeterminedAbnormal ECGWhen compared with ECG of 03-DEC-2022 08:52,Significant changes have occurred             RADIOLOGY  CT CHEST PULMONARY EMBOLISM W CONTRAST   Final Result   No evidence of an acute pulmonary embolus.       Severe/end-stage chronic interstitial lung disease with fibrosis,   honeycombing, subpleural blebs and traction bronchiectasis.           XR CHEST PORTABLE   Final Result  Pulmonary change without acute cardiopulmonary process. Pertinent previous results reviewed   Echocardiogram from 5/6/22   Summary   Technically difficult examination. Apical views are off axis secondary to pt   left ribs fracture. Left ventricular systolic function is normal with ejection fraction   estimated at 55%. No regional wall motion abnormalities. There is mild concentric left ventricular hypertrophy. Indeterminate left ventricular filling pressure. Systolic pulmonary artery pressure (SPAP) is normal estimated at 22 mmHg   (Right atrial pressure of 8 mmHg).         ASSESSMENT/PLAN:        #Acute on Chronic hypoxic respiratory failure   # ILD      -on 4-6 L baseline home oxygen, initially requiring 10 L--> now weaned down to baseline home 5 L   -CTA as above with no PE or infiltrates   -IV solumedrol started with HHN  -resumed home pirfenidone   -procalc neg,   -sputum culture ordered but not submitted   -on zithromax   -pulmonology consulted   - overall improved, change to oral prednisone taper  -Still desaturates with any activity but this is her baseline  No cough  Reports very minimal activity at home        #Atrial fibrillation with RVR   - hx of chronic Afibb , not on AC with recurrent anemia and nose bleed and hemorrhoidal bleed   -rates initially in the 160s- improved spontaneously with improved hypoxia  -resumed on cardizem and corgard - back to NSR   -cardiology consulted         #Chronic diastolic HF  -last echo as above - well compensated  -resume  lasix   -daily weights   -intake and output       #Hypomagnesemia   #Hypokalemia   -replacement protocol   -monitored with BMPs      #Epistaxis, recurrent   -hx of septal perforation 2/2 to NC use   -has required cauterizations in the past   -bleeding was controlled with afrin and nasal pressure  -ENT consulted  , resolved        #Microcytic Anemia   -mild   -continue to monitor Hgb - add oral iron      #Generalized weakness   -IVF   -PT/OT   -fall precautions         DM type 2  -Has been taken off diuretics and diabetic meds for progressive weight loss in the last year with pulmonary cachexia   -SSI  - A1c at 4.9       HLD  -continue statin     Hypothyroidism  -continue synthroid     #Depression/Anxiety   -on zoloft high dose at 200 mg - recommend to cut down dose   -ativan prn            DVT Prophylaxis: Lovenox   Diet: ADULT DIET;  Regular; 4 carb choices (60 gm/meal)  Code Status: Full Code       Dc to home    Hua Torres MD, 1/18/2023 10:00 AM

## 2023-01-18 NOTE — PLAN OF CARE
Problem: Discharge Planning  Goal: Discharge to home or other facility with appropriate resources  Outcome: Progressing     Problem: Chronic Conditions and Co-morbidities  Goal: Patient's chronic conditions and co-morbidity symptoms are monitored and maintained or improved  Outcome: Progressing     Problem: Skin/Tissue Integrity  Goal: Absence of new skin breakdown  Description: 1. Monitor for areas of redness and/or skin breakdown  2. Assess vascular access sites hourly  3. Every 4-6 hours minimum:  Change oxygen saturation probe site  4. Every 4-6 hours:  If on nasal continuous positive airway pressure, respiratory therapy assess nares and determine need for appliance change or resting period.   Outcome: Progressing     Problem: Respiratory - Adult  Goal: Achieves optimal ventilation and oxygenation  Outcome: Progressing     Problem: Cardiovascular - Adult  Goal: Maintains optimal cardiac output and hemodynamic stability  Outcome: Progressing     Problem: Skin/Tissue Integrity - Adult  Goal: Skin integrity remains intact  Outcome: Progressing     Problem: Musculoskeletal - Adult  Goal: Return mobility to safest level of function  Outcome: Progressing     Problem: Safety - Adult  Goal: Free from fall injury  Outcome: Progressing

## 2023-01-18 NOTE — PROGRESS NOTES
Bedside report and transfer of care given to Sha Hernandez, [address] [place] [address]. Pt currently resting in bed with the call light within reach. Pt denies any other care needs at this time. Pt stable at this time.

## 2023-01-18 NOTE — PROGRESS NOTES
01/17/23 1915   RT Protocol   History Pulmonary Disease 2   Respiratory pattern 4   Breath sounds 0   Cough 0   Indications for Bronchodilator Therapy On home bronchodilators   Bronchodilator Assessment Score 6   RT Inhaler-Nebulizer Bronchodilator Protocol Note    There is a bronchodilator order in the chart from a provider indicating to follow the RT Bronchodilator Protocol and there is an Initiate RT Inhaler-Nebulizer Bronchodilator Protocol order as well (see protocol at bottom of note). CXR Findings:  XR CHEST PORTABLE    Result Date: 1/17/2023  Pulmonary change without acute cardiopulmonary process. The findings from the last RT Protocol Assessment were as follows:   History Pulmonary Disease: Chronic pulmonary disease  Respiratory Pattern: Mild dyspnea at rest, irregular pattern, or RR 21-25 bpm  Breath Sounds: Clear breath sounds  Cough: Strong, spontaneous, non-productive  Indication for Bronchodilator Therapy: On home bronchodilators  Bronchodilator Assessment Score: 6    Aerosolized bronchodilator medication orders have been revised according to the RT Inhaler-Nebulizer Bronchodilator Protocol below. Respiratory Therapist to perform RT Therapy Protocol Assessment initially then follow the protocol. Repeat RT Therapy Protocol Assessment PRN for score 0-3 or on second treatment, BID, and PRN for scores above 3. No Indications - adjust the frequency to every 6 hours PRN wheezing or bronchospasm, if no treatments needed after 48 hours then discontinue using Per Protocol order mode. If indication present, adjust the RT bronchodilator orders based on the Bronchodilator Assessment Score as indicated below.   Use Inhaler orders unless patient has one or more of the following: on home nebulizer, not able to hold breath for 10 seconds, is not alert and oriented, cannot activate and use MDI correctly, or respiratory rate 25 breaths per minute or more, then use the equivalent nebulizer order(s) with same Frequency and PRN reasons based on the score. If a patient is on this medication at home then do not decrease Frequency below that used at home. 0-3 - enter or revise RT bronchodilator order(s) to equivalent RT Bronchodilator order with Frequency of every 4 hours PRN for wheezing or increased work of breathing using Per Protocol order mode. 4-6 - enter or revise RT Bronchodilator order(s) to two equivalent RT bronchodilator orders with one order with BID Frequency and one order with Frequency of every 4 hours PRN wheezing or increased work of breathing using Per Protocol order mode. 7-10 - enter or revise RT Bronchodilator order(s) to two equivalent RT bronchodilator orders with one order with TID Frequency and one order with Frequency of every 4 hours PRN wheezing or increased work of breathing using Per Protocol order mode. 11-13 - enter or revise RT Bronchodilator order(s) to one equivalent RT bronchodilator order with QID Frequency and an Albuterol order with Frequency of every 4 hours PRN wheezing or increased work of breathing using Per Protocol order mode. Greater than 13 - enter or revise RT Bronchodilator order(s) to one equivalent RT bronchodilator order with every 4 hours Frequency and an Albuterol order with Frequency of every 2 hours PRN wheezing or increased work of breathing using Per Protocol order mode.      Electronically signed by Candy Reyez RCP on 1/17/2023 at 7:17 PM

## 2023-01-18 NOTE — CARE COORDINATION
INTERDISCIPLINARY PLAN OF CARE CONFERENCE    Date/Time: 1/18/2023 11:35 AM  Completed by: Dru Cruz RN, Case Management      Patient Name:  Hanna Obando  YOB: 1955  Admitting Diagnosis: Epistaxis [R04.0]  Hypokalemia [E87.6]  Hypomagnesemia [E83.42]  PAF (paroxysmal atrial fibrillation) ([facility] [I48.0]  Atrial fibrillation with RVR ([facility] [I48.91]     Admit Date/Time:  1/16/2023 11:48 PM    Chart reviewed. Interdisciplinary team contacted or reviewed plan related to patient progress and discharge plans. Disciplines included Case Management, Nursing, and Dietitian. Current Status: IP 01/17/2023  PT/OT recommendation for discharge plan of care: [facility] 18  Discharge Recommendations: Home 24 hr assist and with home PT  and Home PT  DME needs for discharge: Needs Met  Expected D/C Disposition:  Home  Confirmed plan with patient and, Cynthia Fung (Spouse)  Discharge Plan Comments: Chart reviewed. Met with pt and Rony (Spouse) at bedside and explained the role of the CM. Plans to return home. Dependant on SO for assistance w/ADLS. Resumption of care w/ [facility]. Orders, CAROLANN and AVS in [facility] and will be pulled for start of care per [facility]). Spouse will provide transportation per private vehicle today.       Home O2 in place on admit: YES (3 liters)  Pt informed of need to bring portable home O2 tank on day of discharge for nursing to connect prior to leaving:  YES  Verbalized agreement/Understanding: YES

## 2023-01-18 NOTE — PROGRESS NOTES
Patient reports burning when she urinates. Order put in for a urine culture, collected, and sent down to lab.

## 2023-01-18 NOTE — PROGRESS NOTES
Pulmonary Progress Note    CC: Shortness of breath hypoxia    Subjective:   Remains on O2 trending down, 3 L   Shortness of breath on exertion         Intake/Output Summary (Last 24 hours) at 1/18/2023 0756  Last data filed at 1/17/2023 1200  Gross per 24 hour   Intake 180 ml   Output --   Net 180 ml       Exam:   /64   Pulse 86   Temp 99.3 °F (37.4 °C) (Oral)   Resp 20   Ht 5' 2\" (1.575 m)   Wt 108 lb 4.8 oz (49.1 kg)   SpO2 100%   BMI 19.81 kg/m²  on 3 L  Gen: No distress. Ill-appearing  Eyes: PERRL. No sclera icterus. No conjunctival injection. ENT: No discharge. Pharynx clear. Neck: Trachea midline. No obvious mass. Resp: Mild accessory muscle use. Basilar crackles. No wheezes. No rhonchi. No dullness on percussion. CV: Regular rate. irregular rhythm. No murmur or rub. No edema. GI: Non-tender. Non-distended. No hernia. Skin: Warm and dry. No nodule on exposed extremities. Lymph: No cervical LAD. No supraclavicular LAD. M/S: No cyanosis. No joint deformity. No clubbing. Neuro: Awake. Alert. Moves all four extremities. Psych: Oriented x 3.  No anxiety    Scheduled Meds:   atorvastatin  40 mg Oral Daily    dilTIAZem  240 mg Oral Daily    levothyroxine  125 mcg Oral Daily    mirtazapine  30 mg Oral Nightly    nadolol  20 mg Oral Daily    pantoprazole  40 mg Oral QAM AC    Pirfenidone  267 mg Oral TID    sertraline  200 mg Oral Daily    sodium chloride flush  10 mL IntraVENous 2 times per day    enoxaparin  30 mg SubCUTAneous Daily    azithromycin  500 mg IntraVENous Q24H    insulin lispro  0-4 Units SubCUTAneous Q4H    ipratropium-albuterol  1 ampule Inhalation TID    predniSONE  40 mg Oral Daily     Continuous Infusions:   dextrose      sodium chloride 5 mL/hr at 01/18/23 0639     PRN Meds:  benzonatate, hydrOXYzine HCl, LORazepam, traMADol, glucose, dextrose bolus **OR** dextrose bolus, glucagon (rDNA), dextrose, sodium chloride flush, sodium chloride, ondansetron **OR** ondansetron, polyethylene glycol, acetaminophen **OR** acetaminophen, potassium chloride **OR** potassium alternative oral replacement **OR** potassium chloride, magnesium sulfate, albuterol    Labs:  CBC:   Recent Labs     01/16/23 2355 01/18/23  0452   WBC 8.5 8.4   HGB 11.0* 9.0*   HCT 34.8* 28.2*   MCV 79.9* 81.0    141     BMP:   Recent Labs     01/16/23 2355 01/18/23 0452    142   K 3.3* 2.8*   CL 97* 104   CO2 36* 30   BUN 9 7   CREATININE <0.5* <0.5*     LIVER PROFILE:   Recent Labs     01/16/23 2355 01/18/23 0452   AST 49* 29   ALT 28 17   BILITOT 0.5 0.3   ALKPHOS 162* 121     PT/INR:   Recent Labs     01/16/23 2355   PROTIME 14.6*   INR 1.15*     APTT:   Recent Labs     01/16/23 2355   APTT 29.7     UA:  Recent Labs     01/17/23  0235   COLORU Yellow   PHUR 6.5   CLARITYU Clear   SPECGRAV <=1.005   LEUKOCYTESUR Negative   UROBILINOGEN 0.2   BILIRUBINUR Negative   BLOODU Negative   GLUCOSEU Negative     No results for input(s): PHART, PUB7TTM, PO2ART in the last 72 hours. Cultures:   1/17 respiratory pending  1/17 COVID-19    Films:  CT chest 1/17 imaging was reviewed by me and showed   No evidence of an acute pulmonary embolus.    Severe/end-stage chronic interstitial lung disease with fibrosis,   honeycombing, subpleural blebs and traction bronchiectasis        ASSESSMENT:  Acute hypoxemic respiratory failure  ILD on pirfenidone  COPD with acute exacerbation  Chronic hypoxemic respiratory failure-baseline 4 to 6 L O2  History of pneumothorax  Chronic A. fib with now RVR  Chronic diastolic CHF     PLAN:  Supplemental oxygen to maintain SaO2 >92%; wean as tolerated  Inhaled bronchodilators  Quick prednisone taper  Okay to resume pirfenidone  PT OT  Acapella and Mucinex  Cardiology following and managing A. fib  PT OT  Discussed with internal medicine

## 2023-01-18 NOTE — PROGRESS NOTES
Shift assessment completed. See flow sheet. Medications given. Patient is currently on 5L of O2. Vitals are stable. Patient denies further needs. Call light within reach.

## 2023-01-18 NOTE — DISCHARGE INSTR - COC
Continuity of Care Form    Patient Name: Ej Murry   :  1955  MRN:  [id number]    Admit date:  2023  Discharge date:  2023    Code Status Order: Full Code   Advance Directives:     Admitting Physician:  Sheron Mercedes MD  PCP: Timothy Meneses MD    Discharging Nurse: [facility] Unit/Room#: /0879-09  Discharging Unit Phone Number: [phone]    Emergency Contact:   Extended Emergency Contact Information  Primary Emergency Contact: [facility]  Address: [address] [place] [address] Phone: [phone]  Mobile Phone: [phone]  Relation: Spouse  Secondary Emergency Contact: [facility]  Address: [address] [place] [address]           Meet JONES   Home Phone: [phone]  Mobile Phone: [phone]  Relation: Child    Past Surgical History:  Past Surgical History:   Procedure Laterality Date    ARM SURGERY Left 3/2/2020    LEFT ULNAR NERVE DECOMPRESSION AT THE ELBOW performed by Jona Goncalves MD at [address] [place] [address] 2019    EBUS WF W/ANES.  performed by Nisa Quiñones MD at [facility]  2019    BRONCHOSCOPY/TRANSBRONCHIAL NEEDLE BIOPSY performed by Nisa Quiñones MD at [facility]  2019    BRONCHOSCOPY/TRANSBRONCHIAL LUNG BIOPSY performed by Nisa Quiñones MD at [facility]  2019    BRONCHOSCOPY ALVEOLAR LAVAGE performed by Nisa Quiñones MD at [facility]  07/10/2019    BRONCHOSCOPY N/A 7/10/2019    BRONCHOSCOPY ALVEOLAR LAVAGE performed by Bryce Ochoa MD at [facility] Bilateral 2019    BRONCHOSCOPY N/A 2019    BRONCHOSCOPY ALVEOLAR LAVAGE performed by Senthil Mccallum MD at [facility] N/A 2019    BRONCHOSCOPY ALVEOLAR LAVAGE performed by Bonnie Conner MD at [address] [place] [address] 8/25/2022    COLONOSCOPY POLYPECTOMY SNARE/COLD BIOPSY performed by Sadiq Daniel MD at [address] [place]  9/15/2022    CT GUIDED CHEST TUBE 9/15/2022 [address] SCAN    CT INSERT CATH PLEURA W IMAGE  11/28/2022    CT INSERT CATH PLEURA W IMAGE 11/28/2022 Kelsy Addison MD [facility] CT SCAN    HYSTERECTOMY, TOTAL ABDOMINAL (CERVIX REMOVED)      partial       Immunization History:   Immunization History   Administered Date(s) Administered    COVID-19, MODERNA BLUE border, Primary or Immunocompromised, (age 12y+), IM, 100 mcg/0.5mL 03/25/2021, 04/22/2021    Influenza Vaccine, unspecified formulation 02/13/2016    Influenza Virus Vaccine 01/15/2016, 02/13/2016    Influenza, FLUAD, (age 72 y+), Adjuvanted, 0.5mL 11/23/2020, 11/17/2021    Influenza, Triv, inactivated, subunit, adjuvanted, IM (Fluad 65 yrs and older) 11/14/2019    Pneumococcal Conjugate 13-valent ([id number]) 06/18/2019    Pneumococcal Polysaccharide ([id number]) 11/14/2013, 04/03/2014    Tdap (Boostrix, Adacel) 02/04/2021       Active Problems:  Patient Active Problem List   Diagnosis Code    Chest pain R07.9    HTN (hypertension) I10    Hyperlipidemia with target LDL less than 130 E78.5    Hypokalemia E87.6    Insomnia G47.00    Trochanteric bursitis of both hips M70.61, M70.62    Migraine G43.909    Syncope R55    Acute blood loss anemia D62    Fatigue R53.83    Hypothyroidism E03.9    Mild single current episode of major depressive disorder ([facility]) F32.0    Anxiety F41.9    Pneumonia of both lower lobes due to infectious organism J18.9    A-fib ([facility]) I48.91    Atypical pneumonia J18.9    Acute bronchitis with bronchospasm J20.9    Acute on chronic diastolic CHF (congestive heart failure) ([facility]) I50.33    Class 2 obesity with body mass index (BMI) of 39.0 to 39.9 in adult E66.9, Z68.39    Abnormal chest CT N21.19    Acute diastolic heart failure ([facility]) I50.31    ILD (interstitial lung disease) ([facility]) J84.9    Acute on chronic respiratory failure with hypoxia ([facility]) J96.21    Restrictive lung disease J98.4    Abnormal CT of the chest R93.89    SOB (shortness of breath) R06.02    Acute cystitis without hematuria N30.00    Dyspnea and respiratory abnormalities R06.00, R06.89    Acute on chronic respiratory failure with hypoxemia ([facility]) J96.21    Cryptogenic organizing pneumonia ([facility]) J84.116    Pneumothorax of left lung after biopsy J95.811    COPD (chronic obstructive pulmonary disease) ([facility]) J44.9    Type 2 diabetes mellitus without complication ([facility]) P01.3    Hyponatremia E87.1    Numbness and tingling in left hand R20.0, R20.2    Ulnar neuropathy at elbow of left upper extremity G56.22    Acute congestive heart failure ([facility]) I50.9    Left wrist pain M25.532    Left wrist tendinitis M77.8    GERD (gastroesophageal reflux disease) D90.5    Toxic metabolic encephalopathy D90.8    VIRGINIE (acute kidney injury) ([facility]) N17.9    Lumbar radiculopathy M54.16    Acute respiratory failure with hypoxia and hypercapnia ([facility]) J96.01, J96.02    Rib pain on left side R07.81    Elevated brain natriuretic peptide (BNP) level R79.89    Fracture of multiple ribs of left side S22.42XA    Interstitial lung disease ([facility]) J84.9    Hypomagnesemia E83.42    Depression F32. A    Chronic diastolic congestive heart failure ([facility]) I50.32    Burst fracture of lumbar vertebra ([facility] S32.001A    H/O Spinal surgery Z98.890    Severe malnutrition ([facility] E43    Pneumonia due to COVID-19 virus U07.1, J12.82    Primary spontaneous pneumothorax J93.11    Secondary spontaneous pneumothorax J93.12    COVID-19 U07.1    Acute on chronic respiratory failure ([facility]) J96.20    Diarrhea R19.7    Pulmonary fibrosis ([facility]) J84.10    PSVT (paroxysmal supraventricular tachycardia) ([facility]) I47.1    PAF (paroxysmal atrial fibrillation) ([facility]) I48.0    SVT (supraventricular tachycardia) ([facility]) I47.1    Pneumothorax J93.9    Tension pneumothorax J93.0    Dependence on supplemental oxygen Z99.81    Atrial fibrillation with RVR ([facility] I48.91    Chronic hypoxemic respiratory failure ([facility]) J96.11    COPD exacerbation ([facility] J44.1    Acute hypoxemic respiratory failure ([facility]) J96.01    Epistaxis R04.0       Isolation/Infection:   Isolation            No Isolation          Patient Infection Status       Infection Onset Added Last Indicated Last Indicated By Review Planned Expiration Resolved Resolved By    None active    Resolved    COVID-19 (Rule Out) 01/17/23 01/17/23 01/17/23 COVID-19, Rapid (Ordered)   01/17/23 Rule-Out Test Resulted    COVID-19 (Rule Out) 11/26/22 11/26/22 11/26/22 COVID-19, Rapid (Ordered)   11/26/22 Rule-Out Test Resulted    COVID-19 (Rule Out) 10/19/22 10/19/22 10/19/22 COVID-19 & Influenza Combo (Ordered)   10/19/22 Rule-Out Test Resulted    C-diff Rule Out 10/19/22 10/19/22 10/19/22 GI Bacterial Pathogens By PCR (Ordered)   10/19/22 Rule-Out Test Resulted    COVID-19 09/09/22 09/09/22 09/09/22 COVID-19 & Influenza Combo   09/19/22 Nazanin Marte RN    Pt is asymptomatic and 5 days or greater since positive testing, per Dr and PCU staff,     COVID-19 (Rule Out) 09/09/22 09/09/22 09/09/22 COVID-19 & Influenza Combo (Ordered)   09/09/22 Rule-Out Test Resulted    COVID-19 (Rule Out) 07/14/22 07/14/22 07/14/22 COVID-19, Rapid (Ordered)   07/14/22 Rule-Out Test Resulted    COVID-19 (Rule Out) 07/04/22 07/04/22 07/04/22 COVID-19, Rapid (Ordered)   07/04/22 Rule-Out Test Resulted    COVID-19 (Rule Out) 05/06/22 05/06/22 05/06/22 COVID-19, Rapid (Ordered)   05/06/22 Rule-Out Test Resulted    COVID-19 (Rule Out) 04/18/22 04/18/22 04/18/22 COVID-19 & Influenza Combo (Ordered)   04/18/22 Rule-Out Test Resulted    COVID-19 (Rule Out) 01/25/22 01/25/22 01/25/22 COVID-19 & Influenza Combo (Ordered)   01/25/22 Rule-Out Test Resulted    COVID-19 (Rule Out) 01/24/22 01/24/22 01/24/22 COVID-19, Rapid (Ordered)   01/24/22 Rule-Out Test Resulted    COVID-19 (Rule Out) 06/18/21 06/18/21 06/18/21 COVID-19 & Influenza Combo (Ordered) 06/18/21 Rule-Out Test Resulted            Nurse Assessment:  Last Vital Signs: /68   Pulse 68   Temp 98 °F (36.7 °C) (Axillary)   Resp 20   Ht 5' 2\" (1.575 m)   Wt 108 lb 4.8 oz (49.1 kg)   SpO2 100%   BMI 19.81 kg/m²     Last documented pain score (0-10 scale): Pain Level: 6  Last Weight:   Wt Readings from Last 1 Encounters:   01/18/23 108 lb 4.8 oz (49.1 kg)     Mental Status:  oriented    IV Access:  - None    Nursing Mobility/ADLs:  Walking   Assisted  Transfer  Assisted  Bathing  Assisted  Dressing  Assisted  Toileting  Assisted  Feeding  Assisted  Med Admin  Assisted  Med Delivery   whole    Wound Care Documentation and Therapy:        Elimination:  Continence: Bowel: Yes  Bladder: Yes  Urinary Catheter: None   Colostomy/Ileostomy/Ileal Conduit: No       Date of Last BM: 01/17/23    Intake/Output Summary (Last 24 hours) at 1/18/2023 1049  Last data filed at 1/17/2023 1200  Gross per 24 hour   Intake 120 ml   Output --   Net 120 ml     I/O last 3 completed shifts: In: 180 [P.O.:180]  Out: -     Safety Concerns: At Risk for Falls    Impairments/Disabilities:      None    Nutrition Therapy:  Current Nutrition Therapy:   - Oral Diet:  NPO and General    Routes of Feeding: Oral  Liquids: Thin Liquids  Daily Fluid Restriction: no  Last Modified Barium Swallow with Video (Video Swallowing Test): not done    Treatments at the Time of Hospital Discharge:   Respiratory Treatments:   Oxygen Therapy:  is on oxygen at 5 L/min per nasal cannula. Ventilator:    - No ventilator support    Rehab Therapies:   Weight Bearing Status/Restrictions: No weight bearing restrictions  Other Medical Equipment (for information only, NOT a DME order):  wheelchair  Other Treatments:     Patient's personal belongings (please select all that are sent with patient):   All personal belongings sent with michael BARTON SIGNATURE:  Electronically signed by Lb Denson RN on 1/18/23 at 11:23 AM EST    CASE MANAGEMENT/SOCIAL WORK SECTION    Inpatient Status Date: 01/7/2023    Readmission Risk Assessment Score:  Readmission Risk              Risk of Unplanned Readmission:  54           Discharging to Facility/ Agency   Name: [facility]  Address: [address] [place] [address]  [address] [place] [address], [address] [place] [address]  Phone: [phone]  Fax: fax [phone]     Dialysis Facility (if applicable)   Name:  Address:  Dialysis Schedule:  Phone:  Fax:    / signature: Electronically signed by Tyler Wallace RN on 1/18/23 at 10:49 AM EST    PHYSICIAN SECTION    Prognosis: Good    Condition at Discharge: Stable    Rehab Potential (if transferring to Rehab):    Recommended Labs or Other Treatments After Discharge:     Physician Certification: I certify the above information and transfer of Nathanael Smith  is necessary for the continuing treatment of the diagnosis listed and that she requires Home Care for less 30 days.      Update Admission H&P: Changes in H&P as follows - see attached    PHYSICIAN SIGNATURE: GERARD Deras MD/  Electronically signed by Tyler Wallace RN on 1/18/23 at 10:50 AM EST

## 2023-01-18 NOTE — PROGRESS NOTES
Patient educated on discharge instructions as well as new medications use, dosage, administration and possible side effects. Patient verified knowledge. IV removed without difficulty and dry dressing in place. Telemetry monitor removed and returned to [facility]. Pt left facility in stable condition to Home with all of their personal belongings.

## 2023-01-18 NOTE — FLOWSHEET NOTE
Shift assessment completed. Patient awake in bed. A/Ox4. See flowsheet for vitals. Patient dested into the 70's twice ambulating to the bedside commode. Patient was put on a nonrebreather both times at 15L to recover. Patient is now stable on a High flow NC at 5L, sats are 97%. Patient agreed to use the bedpan. Scheduled PM medications given. Patient denies any pain or any further needs at this time. Call light and bedside table within reach.

## 2023-01-19 ENCOUNTER — FOLLOWUP TELEPHONE ENCOUNTER (OUTPATIENT)
Dept: ADMINISTRATIVE | Age: 68
End: 2023-01-19

## 2023-01-19 ENCOUNTER — CARE COORDINATION (OUTPATIENT)
Dept: CASE MANAGEMENT | Age: 68
End: 2023-01-19

## 2023-01-19 DIAGNOSIS — J96.01 ACUTE HYPOXEMIC RESPIRATORY FAILURE (HCC): Primary | ICD-10-CM

## 2023-01-19 LAB
ORGANISM: ABNORMAL
URINE CULTURE, ROUTINE: ABNORMAL

## 2023-01-19 PROCEDURE — 1111F DSCHRG MED/CURRENT MED MERGE: CPT | Performed by: FAMILY MEDICINE

## 2023-01-19 NOTE — TELEPHONE ENCOUNTER
Heart Failure Follow-Up Call:    Call within 72 Hours of Discharge: Yes     Patient: Linus Mederos   Patient : 1955   MRN: [id number]    Date of discharge: 23    Discharge department/facility: [facility] / [address] [place] [address] Road    Discharge Disposition: Home    RARS: Readmission Risk Score: 40.3    Spoke with: Starr Massey / Patient    Non-face-to-face services provided:   Education of patient/family/caregiver/guardian to support self-management-   Starr Massey stated she is doing much better since being home. Denied any chest pain or shortness of breath. No oxygen requirements at home. Reinforced Heart Failure education on: signs/symptoms to monitor, medications, daily weights, low sodium diet, 2000 ml fluid restriction, and activity. Patient weighs her self each day and today was 107 lbs. Denied any needs for Home Health services. Reminded patient of follow-up appointment on  at 2:15 with Dr. Kody Bo. Provided Heart Failure nurse resource number for any further questions/assistance.      Follow Up  Future Appointments   Date Time Provider Calvin Mcmullen   2023  2:15 PM Wallace Galvan MD [facility]   2023  8:30 AM CALI Dhaliwal MD [facility]   3/15/2023 10:00 AM [facility]   3/15/2023 11:15 AM Muneer Ernest Dibbles, MD [facility]       Electronically signed by Navarro Whalen RN on 2023 at 2:11 PM

## 2023-01-19 NOTE — CARE COORDINATION
[facility] Care Transitions Initial Follow Up Call    Call within 2 business days of discharge: Yes    Care Transition Nurse contacted the patient by telephone to perform post hospital discharge assessment. Provided introduction to self, and explanation of the Care Transition Nurse role. Patient: Michele Obando Patient : 1955   MRN: [id number]  Reason for Admission: acute on chronic hypoxic resp failure, ILD, A Fib with RVR (no AC - recurrent anemia), chronic dCHF, hypomagnesemia, hypokalemia, epistaxis-recurent, hx septal perforation 2/2 NC use, microcytic anemia, generalized weakness, DM2, HLD, hypothyroidism, depression/anxiety -> home with [facility]  Discharge Date: 23 RARS: Readmission Risk Score: 40.3      Last Discharge [address]       Date Complaint Diagnosis Description Type Department Provider    23 Shortness of Breath Epistaxis . .. ED to Hosp-Admission (Discharged) (ADMITTED) [address] [facility] Chidi Madison MD; William Perez . .. Challenges to be reviewed by the provider   Additional needs identified to be addressed with provider: No         Method of communication with provider: none. Reports she is doing okay this morning. Wearing O2 at 5lpm and maintaining 90s. Home care has contacted her for [facility]. She has all her medications. Only takes furosemide prn for edema, SOB, weight gain. Reviewed TCM visit  as below. States she will follow up with her PCP because she doesn't physically go to his office because it is too far. States all visits are virtual or by phone. States she also sees cardiology next week. Denies needs at this time. Care Transition Nurse reviewed discharge instructions with patient who verbalized understanding. The patient was given an opportunity to ask questions and does not have any further questions or concerns at this time. Were discharge instructions available to patient? Yes.  Reviewed appropriate site of care based on symptoms and resources available to patient including: PCP  Specialist  Home health. The patient agrees to contact the PCP office for questions related to their healthcare. Advance Care Planning:   Does patient have an Advance Directive:  has ACP docs on file . Medication reconciliation was performed with patient, who verbalizes understanding of administration of home medications.  Medications reviewed, 1111F entered: yes    Was patient discharged with a pulse oximeter? no    Non-face-to-face services provided:  Obtained and reviewed discharge summary and/or continuity of care documents  Education of patient/family/caregiver/guardian to support self-management-   Assessment and support for treatment adherence and medication management-     Offered patient enrollment in the Remote Patient Monitoring (RPM) program for in-home monitoring: Patient is not eligible for RPM program.    Care Transitions 24 Hour Call    Schedule Follow Up Appointment with PCP: Completed  Do you have a copy of your discharge instructions?: Yes  Do you have all of your prescriptions and are they filled?: Yes  Have you been contacted by a [facility] Pharmacist?: No  Have you scheduled your follow up appointment?: Yes  How are you going to get to your appointment?: Other  Post Acute Services: [address] [place] [address] Jose Juan Hernandez  Patient DME: Iva Russ chair  Patient Home Equipment: Nebulizer, Oxygen  Do you have support at home?: Partner/Spouse/SO  Do you feel like you have everything you need to keep you well at home?: Yes  Are you an active caregiver in your home?: No  Care Transitions Interventions  No Identified Needs         Follow Up  Future Appointments   Date Time Provider Calvin Mcmullen   1/25/2023  2:15 PM Kelly Ambriz MD [facility]   2/7/2023  8:30 AM MD Alfred Parker [facility]   3/15/2023 10:00 AM [facility]   3/15/2023 11:15 AM MD Kemi Boo [facility]       Care Transition Nurse provided contact information. Plan for follow-up call in 5-7 days based on severity of symptoms and risk factors.   Plan for next call: symptom management-ILD Resp Failure    Danielle Chambers RN  Care Transition Nurse  [phone] mobile

## 2023-01-24 ENCOUNTER — CARE COORDINATION (OUTPATIENT)
Dept: CASE MANAGEMENT | Age: 68
End: 2023-01-24

## 2023-01-24 NOTE — CARE COORDINATION
[facility] Care Transitions Follow Up Call      Patient: Hasmukh Bishop  Patient : 1955   MRN: [id number]  Reason for Admission: acute on chronic hypoxic resp failure, ILD, A Fib with RVR (no AC - recurrent anemia), chronic dCHF, hypomagnesemia, hypokalemia, epistaxis-recurent, hx septal perforation 2/2 NC use, microcytic anemia, generalized weakness, DM2, HLD, hypothyroidism, depression/anxiety -> home with [facility] [employer] [facility]  Discharge Date: 23 RARS: Readmission Risk Score: 40.3    CTN attempted follow-up outreach to patient. Message left including CTN contact information.      Follow Up  Future Appointments   Date Time Provider Calvin Mcmullen   2023  1:15 PM Kelvin Hamilton MD [facility]   2023  8:30 AM MD Jhonny Moyer [facility]   3/15/2023 10:00 AM [facility]ARY FUNCTION [facility] Desmond Choi   3/15/2023 11:15 AM Ani Shah MD [facility] PULM [facility]     Sultana Dudley, RN  Care Transition Nurse  [phone] mobile

## 2023-01-26 ENCOUNTER — TELEMEDICINE (OUTPATIENT)
Dept: FAMILY MEDICINE CLINIC | Age: 68
End: 2023-01-26
Payer: MEDICARE

## 2023-01-26 DIAGNOSIS — J84.9 INTERSTITIAL LUNG DISEASE (HCC): Primary | ICD-10-CM

## 2023-01-26 DIAGNOSIS — J96.21 ACUTE ON CHRONIC RESPIRATORY FAILURE WITH HYPOXIA (HCC): ICD-10-CM

## 2023-01-26 DIAGNOSIS — E03.9 ACQUIRED HYPOTHYROIDISM: ICD-10-CM

## 2023-01-26 PROCEDURE — G8484 FLU IMMUNIZE NO ADMIN: HCPCS | Performed by: FAMILY MEDICINE

## 2023-01-26 PROCEDURE — G8420 CALC BMI NORM PARAMETERS: HCPCS | Performed by: FAMILY MEDICINE

## 2023-01-26 PROCEDURE — 1036F TOBACCO NON-USER: CPT | Performed by: FAMILY MEDICINE

## 2023-01-26 PROCEDURE — 99214 OFFICE O/P EST MOD 30 MIN: CPT | Performed by: FAMILY MEDICINE

## 2023-01-26 PROCEDURE — 1090F PRES/ABSN URINE INCON ASSESS: CPT | Performed by: FAMILY MEDICINE

## 2023-01-26 PROCEDURE — 3017F COLORECTAL CA SCREEN DOC REV: CPT | Performed by: FAMILY MEDICINE

## 2023-01-26 PROCEDURE — G8428 CUR MEDS NOT DOCUMENT: HCPCS | Performed by: FAMILY MEDICINE

## 2023-01-26 PROCEDURE — 1111F DSCHRG MED/CURRENT MED MERGE: CPT | Performed by: FAMILY MEDICINE

## 2023-01-26 PROCEDURE — G8400 PT W/DXA NO RESULTS DOC: HCPCS | Performed by: FAMILY MEDICINE

## 2023-01-26 PROCEDURE — 1123F ACP DISCUSS/DSCN MKR DOCD: CPT | Performed by: FAMILY MEDICINE

## 2023-01-26 RX ORDER — LEVOTHYROXINE SODIUM 0.12 MG/1
125 TABLET ORAL DAILY
Qty: 90 TABLET | Refills: 1 | Status: SHIPPED | OUTPATIENT
Start: 2023-01-26

## 2023-01-26 RX ORDER — BENZONATATE 200 MG/1
CAPSULE ORAL
Qty: 90 CAPSULE | Refills: 5 | Status: SHIPPED | OUTPATIENT
Start: 2023-01-26

## 2023-01-26 ASSESSMENT — ENCOUNTER SYMPTOMS
WHEEZING: 0
SHORTNESS OF BREATH: 1

## 2023-01-26 ASSESSMENT — PATIENT HEALTH QUESTIONNAIRE - PHQ9
1. LITTLE INTEREST OR PLEASURE IN DOING THINGS: 0
3. TROUBLE FALLING OR STAYING ASLEEP: 0
6. FEELING BAD ABOUT YOURSELF - OR THAT YOU ARE A FAILURE OR HAVE LET YOURSELF OR YOUR FAMILY DOWN: 0
10. IF YOU CHECKED OFF ANY PROBLEMS, HOW DIFFICULT HAVE THESE PROBLEMS MADE IT FOR YOU TO DO YOUR WORK, TAKE CARE OF THINGS AT HOME, OR GET ALONG WITH OTHER PEOPLE: 0
9. THOUGHTS THAT YOU WOULD BE BETTER OFF DEAD, OR OF HURTING YOURSELF: 0
SUM OF ALL RESPONSES TO PHQ QUESTIONS 1-9: 0
SUM OF ALL RESPONSES TO PHQ QUESTIONS 1-9: 0
5. POOR APPETITE OR OVEREATING: 0
7. TROUBLE CONCENTRATING ON THINGS, SUCH AS READING THE NEWSPAPER OR WATCHING TELEVISION: 0
2. FEELING DOWN, DEPRESSED OR HOPELESS: 0
8. MOVING OR SPEAKING SO SLOWLY THAT OTHER PEOPLE COULD HAVE NOTICED. OR THE OPPOSITE, BEING SO FIGETY OR RESTLESS THAT YOU HAVE BEEN MOVING AROUND A LOT MORE THAN USUAL: 0
SUM OF ALL RESPONSES TO PHQ QUESTIONS 1-9: 0
SUM OF ALL RESPONSES TO PHQ QUESTIONS 1-9: 0
SUM OF ALL RESPONSES TO PHQ9 QUESTIONS 1 & 2: 0
4. FEELING TIRED OR HAVING LITTLE ENERGY: 0

## 2023-01-26 NOTE — PROGRESS NOTES
2023    TELEHEALTH EVALUATION -- Audio/Visual (During EDPGJ-57 public health emergency)    HPI:    Pa Obando (:  1955) has requested an audio/video evaluation for the following concern(s):    Admitted to hospital from - for hypoxia and now back to baseline. Starting PT and OT at home. Little activity at home. Currently at 4-6 liters NC at rest, 8 with activity. Patient with hypothyroidism currently on levothyroxine 125 mcg daily. She needs a refill. Asymptomatic. Review of Systems   Respiratory:  Positive for shortness of breath. Negative for wheezing. All other systems reviewed and are negative. Prior to Visit Medications    Medication Sig Taking?  Authorizing Provider   nadolol (CORGARD) 20 MG tablet Take 1 tablet by mouth daily  Chioma Horne MD   predniSONE (DELTASONE) 10 MG tablet 30 mg x 3 days, 20 mg x 3 days, 10 mg x 3 days then stop  Chioma Horne MD   furosemide (LASIX) 40 MG tablet Take 0.5 tablets by mouth See Admin Instructions Daily prn for edema  Chioma Horne MD   albuterol sulfate HFA (PROVENTIL;VENTOLIN;PROAIR) 108 (90 Base) MCG/ACT inhaler INHALE 2 PUFFS INTO THE LUNGS EVERY 6 HOURS AS NEEDED FOR WHEEZING  Ani Kaplan MD   atorvastatin (LIPITOR) 40 MG tablet TAKE 1 TABLET EVERY DAY  Rivas Leyva MD   loratadine-pseudoephedrine ([employer] ALLERGY RELIEF-D12) 5-120 MG per extended release tablet TAKE 1 TABLET BY MOUTH TWICE A DAY  Rivas Leyva MD   dilTIAZem (CARDIZEM CD) 240 MG extended release capsule Take 1 capsule by mouth daily  Kevon Joe MD   Multiple Vitamins-Minerals (WOMENS ONE DAILY PO) Take 1 tablet by mouth daily  Historical Provider, MD   pantoprazole (PROTONIX) 40 MG tablet TAKE 1 TABLET EVERY MORNING BEFORE BREAKFAST  Rivas Leyva MD   sertraline (ZOLOFT) 100 MG tablet TAKE 2 TABLETS EVERY DAY  Rivas Leyva MD   mirtazapine (REMERON) 30 MG tablet TAKE 1 TABLET EVERY NIGHT  Rivsa Leyva MD Pirfenidone 267 MG TABS pirfenidone daily for a week and then BID for a week and then back to TID. Jonnie Mitchell MD   LORazepam (ATIVAN) 0.5 MG tablet Take 0.5 mg by mouth every 6 hours as needed for Anxiety. Historical Provider, MD   traMADol (ULTRAM) 50 MG tablet Take 50 mg by mouth 2 times daily as needed for Pain. Historical Provider, MD   levothyroxine (SYNTHROID) 125 MCG tablet Take 1 tablet by mouth Daily TAKE 1 TABLET BY MOUTH EVERY DAY  Charlotte Majano MD   benzonatate (TESSALON) 200 MG capsule TAKE 1 CAPSULE BY MOUTH 3 TIMES A DAY AS NEEDED FOR COUGH  Charlotte Majano MD   albuterol (PROVENTIL) (2.5 MG/3ML) 0.083% nebulizer solution INHALE THE CONTENTS OF 1 VIAL VIA NEBULIZER EVERY 6 HOURS AS NEEDED FOR WHEEZING  Charlotte Majano MD       Social History     Tobacco Use    Smoking status: Never    Smokeless tobacco: Never   Vaping Use    Vaping Use: Never used   Substance Use Topics    Alcohol use: No    Drug use: No        Allergies   Allergen Reactions    Penicillins Anaphylaxis     Tolerates Meropenem. Cefuroxime      Tolerates Meropenem. Doxycycline Hives    Imitrex [Sumatriptan] Other (See Comments)     Feels like pins and needles    Meperidine     Other Other (See Comments)     Blood pressure drops, light headed    Sulfa Antibiotics Hives    Keflex [Cephalexin] Itching and Rash     Tolerates Meropenem. Tape Carmen Nico Tape] Rash   ,   Past Medical History:   Diagnosis Date    A-fib ([facility]     Anxiety     Cryptogenic organizing pneumonia ([facility]     Depression     GERD (gastroesophageal reflux disease)     Hyperlipidemia     On home oxygen therapy     uses O2 3L prn    Rash     Thyroid disease     hypothyroidism   ,   Past Surgical History:   Procedure Laterality Date    ARM SURGERY Left 3/2/2020    LEFT ULNAR NERVE DECOMPRESSION AT THE ELBOW performed by Maciel Antonio MD at [facility] N/A 6/27/2019    EBUS CALOS W/BECCA.  performed by Racheal Baca MD at [address] [place] [address] ENDOSCOPY    BRONCHOSCOPY  6/27/2019    BRONCHOSCOPY/TRANSBRONCHIAL NEEDLE BIOPSY performed by Glendy Marrero MD at [place] [address]  6/27/2019    BRONCHOSCOPY/TRANSBRONCHIAL LUNG BIOPSY performed by Glendy Marrero MD at [place] [address]  6/27/2019    BRONCHOSCOPY ALVEOLAR LAVAGE performed by Glendy Marrero MD at [place] [address]  07/10/2019    BRONCHOSCOPY N/A 7/10/2019    BRONCHOSCOPY ALVEOLAR LAVAGE performed by Kelli Cortes MD at [place] [address] Bilateral 08/06/2019    BRONCHOSCOPY N/A 8/6/2019    BRONCHOSCOPY ALVEOLAR LAVAGE performed by Diego Farmer MD at [place] [address] N/A 12/24/2019    BRONCHOSCOPY ALVEOLAR LAVAGE performed by Nikkie Baeza MD at [address] [place] [address] N/A 8/25/2022    COLONOSCOPY POLYPECTOMY SNARE/COLD BIOPSY performed by Rosalee Rivers MD at [facility]  9/15/2022    CT GUIDED CHEST TUBE 9/15/2022 [facility] CT SCAN    CT INSERT CATH PLEURA W IMAGE  11/28/2022    CT INSERT CATH PLEURA W IMAGE 11/28/2022 Shelbi Almanzar MD [facility] CT SCAN    HYSTERECTOMY, TOTAL ABDOMINAL (CERVIX REMOVED)      partial   ,   Social History     Tobacco Use    Smoking status: Never    Smokeless tobacco: Never   Vaping Use    Vaping Use: Never used   Substance Use Topics    Alcohol use: No    Drug use: No       PHYSICAL EXAMINATION:  [ INSTRUCTIONS:  \"[x]\" Indicates a positive item  \"[]\" Indicates a negative item  -- DELETE ALL ITEMS NOT EXAMINED]  Vital Signs: (As obtained by patient/caregiver or practitioner observation)    Not available    Constitutional: [x] Appears well-developed and well-nourished [x] No apparent distress      [] Abnormal-   Mental status  [x] Alert and awake  [x] Oriented to person/place/time [x]Able to follow commands      Eyes:  EOM    [x]  Normal  [] Abnormal-  Sclera  [x]  Normal  [] Abnormal -         Discharge [x]  None visible  [] Abnormal -    HENT:   [x] Normocephalic, atraumatic. [] Abnormal   [x] Mouth/Throat: Mucous membranes are moist.     External Ears [x] Normal  [] Abnormal-     Neck: [x] No visualized mass     Pulmonary/Chest: [x] Respiratory effort normal.  [x] No visualized signs of difficulty breathing or respiratory distress        [] Abnormal-        ASSESSMENT/PLAN:  1. Interstitial lung disease ([address]  Worsening  Follow-up with pulmonology    2. Acute on chronic respiratory failure with hypoxia (HCC)  Back to baseline    3. Acquired hypothyroidism  Asymptomatic  - levothyroxine (SYNTHROID) 125 MCG tablet; Take 1 tablet by mouth Daily TAKE 1 TABLET BY MOUTH EVERY DAY  Dispense: 90 tablet; Refill: 1    Follow-up 3 months    Armin Obando, was evaluated through a synchronous (real-time) audio-video encounter. The patient (or guardian if applicable) is aware that this is a billable service, which includes applicable co-pays. This Virtual Visit was conducted with patient's (and/or legal guardian's) consent. The visit was conducted pursuant to the emergency declaration under the [facility], [address] [place] [address] authority and the [employer] Act. Patient identification was verified, and a caregiver was present when appropriate. The patient was located at Home: [address] [place] [address]. Provider was located at [facility] ([place] [address]): [address] [place], [place] [address]Amy Total time spent on this encounter: Not billed by time    --Alise Giles MD on 1/26/2023 at 1:20 PM    An electronic signature was used to authenticate this note.

## 2023-01-27 ENCOUNTER — TELEPHONE (OUTPATIENT)
Dept: FAMILY MEDICINE CLINIC | Age: 68
End: 2023-01-27

## 2023-01-30 ENCOUNTER — CARE COORDINATION (OUTPATIENT)
Dept: CASE MANAGEMENT | Age: 68
End: 2023-01-30

## 2023-01-30 RX ORDER — ONDANSETRON 4 MG/1
4 TABLET, FILM COATED ORAL 3 TIMES DAILY PRN
Qty: 30 TABLET | Refills: 2 | Status: SHIPPED | OUTPATIENT
Start: 2023-01-30

## 2023-01-30 NOTE — CARE COORDINATION
[facility] Care Transitions Follow Up Call      Patient: Luiza Lay  Patient : 1955   MRN: [id number]  Reason for Admission: acute on chronic hypoxic resp failure, ILD, A Fib with RVR (no AC - recurrent anemia), chronic dCHF, hypomagnesemia, hypokalemia, epistaxis-recurent, hx septal perforation 2/2 NC use, microcytic anemia, generalized weakness, DM2, HLD, hypothyroidism, depression/anxiety -> home with [facility] [facility]  Discharge Date: 23 RARS: Readmission Risk Score: 40.3    CTN attempted follow-up outreach to patient. Message left including CTN contact information. No further CTN outreach scheduled.      Follow Up  Future Appointments   Date Time Provider Calvin Mcmullen   2023  8:30 AM MD Annalise Fernandez   3/15/2023 10:00 AM [facility]ARY FUNCTION [facility] Josemanuel Ding   3/15/2023 11:15 AM MD DURAN Hassan PULM MONO Roper, RN  Care Transition Nurse  [phone] mobile

## 2023-01-30 NOTE — ED PROVIDER NOTES
Patient care at shift change, at 7 AM.  In summary, patient is a 75-year-old female with history of atrial fibrillation, cryptogenic organizing pneumonia, and recent admission to  for multiple rib fractures who presents to the emergency department for evaluation after fall and back pain. Patient care was handed off to me pending CT scans. On my assessment, patient reports she had some improvement after receiving morphine, but the pain is back up to a 7 out of 10. She is moving all extremities. Has intact sensation to all extremities. She was noted to be D satting to upper 80s on her home 3 L nasal cannula O2. O2 increased to 4 L nasal cannula O2. She was given 3 ampoules of DuoNeb's. She was given Percocet for pain control. She desatted once we attempted to wean her nasal cannula back to her home 3 L. She remains on 4 L. CT head shows no acute intracranial bleed. CT lumbar spine showed L1 burst fracture with retropulsion contributing to moderate spinal canal stenosis. She has displaced left ninth rib fracture with no associated pneumothorax. CT abdomen pelvis shows no acute intra-abdominal injury. Per chart review, patient was recently admitted to [facility] and had the left-sided ninth rib fracture in April. L1 burst fracture appears to be new. I do think patient warrants evaluation by spine and possibly TLSO bracing. patient reports improved pain to 5 out of 10 after the Percocet.  trauma consulted. Spoke with Dr. Mark Cabrera (spine) and Dr. Shaka Carrera (ED) and patient graciously accepted for transfer. Patient and  agreeable with plan. Patient transferred by ambulance to [facility] ED. I provided at least 15 minutes of critical care excluding separately billable procedures. Clinical impressions:     ICD-10-CM    1. Chronic midline low back pain without sciatica  M54.50     G89.29     worse currently   2. Tachycardia  R00.0    3. Chronic pain of left wrist  M25.532     G89.29    4.  Chronic pain of left upper extremity  M79.602     G89.29    5. Traumatic injury of head, initial encounter  S09.90XA    6. Malaise  R53.81    7. Abdominal discomfort  R10.9    8. Closed burst fracture of lumbar vertebra, initial encounter ([facility]  S32.001A    9. Closed fracture of one rib of left side, initial encounter  S22.32XA    10.  Hypoxia  R09.02           Brian Block MD  05/28/22 [address] [facility],  Endoscopy[facility]

## 2023-02-03 NOTE — PROGRESS NOTES
[address]   Electrophysiology Consult Note              Date: 2/7/23  Patient Name: Kar Jones  YOB: 1955    Primary Care Physician: Amy Donato MD    CHIEF COMPLAINT:   Chief Complaint   Patient presents with    Follow-Up from Hospital    Tachycardia     PSVT, SVT    Atrial Fibrillation     HISTORY OF PRESENT ILLNESS: Kar Jones is a 77 y.o. female with a PMH significant for PAF, anemia, COPD. Patient presented to [facility] in 11/2022 for SOB and treated for recurrent pneumothoraces. She had PAF in hospital of short duration. Family opted against [id number]. Today, 2/7/2023, ECG demonstrates SR 85 BPM. She presents today in a wheelchair and on oxygen. She has been having nosebleeds. She is taking her medications as prescribed. Patient denies current edema, chest pain, sob, palpitations, dizziness or syncope. Past Medical History:   has a past medical history of A-fib ([address] Anxiety, Cryptogenic organizing pneumonia ([address] Depression, GERD (gastroesophageal reflux disease), Hyperlipidemia, On home oxygen therapy, Rash, and Thyroid disease. Past Surgical History:   has a past surgical history that includes Cholecystectomy; bronchoscopy (N/A, 6/27/2019); bronchoscopy (6/27/2019); bronchoscopy (6/27/2019); bronchoscopy (6/27/2019); bronchoscopy (07/10/2019); bronchoscopy (N/A, 7/10/2019); Hysterectomy, total abdominal; bronchoscopy (Bilateral, 08/06/2019); bronchoscopy (N/A, 8/6/2019); bronchoscopy (N/A, 12/24/2019); Arm Surgery (Left, 3/2/2020); Colonoscopy (N/A, 8/25/2022); CT GUIDED CHEST TUBE (9/15/2022); and CT GUIDED PLEURAL DRAINAGE W CATH PERC (11/28/2022). Allergies:  Penicillins, Cefuroxime, Doxycycline, Imitrex [sumatriptan], Meperidine, Other, Sulfa antibiotics, Keflex [cephalexin], and Tape [adhesive tape]    Social History:   reports that she has never smoked.  She has never used smokeless tobacco. She reports that she does not drink alcohol and does not use drugs. Family History: family history includes Asthma in her sister; Diabetes in her mother; High Blood Pressure in her mother; Other in her father. Home Medications:    Prior to Admission medications    Medication Sig Start Date End Date Taking?  Authorizing Provider   [employer] ALLERGY RELIEF-D12 5-120 MG per extended release tablet TAKE 1 TABLET BY MOUTH TWICE A DAY 2/6/23  Yes Kyle Espinoza MD   ondansetron [facility]) 4 MG tablet Take 1 tablet by mouth 3 times daily as needed for Nausea or Vomiting 1/30/23  Yes Kyle Espinoza MD   levothyroxine (SYNTHROID) 125 MCG tablet Take 1 tablet by mouth Daily TAKE 1 TABLET BY MOUTH EVERY DAY 1/26/23  Yes Kyle Espinoza MD   benzonatate (TESSALON) 200 MG capsule TAKE 1 CAPSULE BY MOUTH 3 TIMES A DAY AS NEEDED FOR COUGH 1/26/23  Yes Kyle Espinoza MD   nadolol (CORGARD) 20 MG tablet Take 1 tablet by mouth daily 1/18/23  Yes River Cruz MD   furosemide (LASIX) 40 MG tablet Take 0.5 tablets by mouth See Admin Instructions Daily prn for edema 1/18/23  Yes River Cruz MD   albuterol sulfate HFA (PROVENTIL;VENTOLIN;PROAIR) 108 (90 Base) MCG/ACT inhaler INHALE 2 PUFFS INTO THE LUNGS EVERY 6 HOURS AS NEEDED FOR WHEEZING 1/13/23  Yes Nigel Butler MD   atorvastatin (LIPITOR) 40 MG tablet TAKE 1 TABLET EVERY DAY 12/22/22  Yes Kyle Espinoza MD   dilTIAZem (CARDIZEM CD) 240 MG extended release capsule Take 1 capsule by mouth daily 12/8/22  Yes Chary Fernandez MD   Multiple Vitamins-Minerals (WOMENS ONE DAILY PO) Take 1 tablet by mouth daily   Yes Historical Provider, MD   pantoprazole (PROTONIX) 40 MG tablet TAKE 1 TABLET EVERY MORNING BEFORE BREAKFAST 10/25/22  Yes Kyle Espinoza MD   sertraline (ZOLOFT) 100 MG tablet TAKE 2 TABLETS EVERY DAY 10/25/22  Yes Kyle Espinoza MD   mirtazapine (REMERON) 30 MG tablet TAKE 1 TABLET EVERY NIGHT 10/25/22  Yes Kyle Espinoza MD   Pirfenidone 267 MG TABS pirfenidone daily for a week and then BID for a week and then back to TID. 10/22/22  Yes Lindsay Akins MD   LORazepam (ATIVAN) 0.5 MG tablet Take 0.5 mg by mouth every 6 hours as needed for Anxiety. Yes Historical Provider, MD   traMADol (ULTRAM) 50 MG tablet Take 50 mg by mouth 2 times daily as needed for Pain. Yes Historical Provider, MD   albuterol (PROVENTIL) (2.5 MG/3ML) 0.083% nebulizer solution INHALE THE CONTENTS OF 1 VIAL VIA NEBULIZER EVERY 6 HOURS AS NEEDED FOR WHEEZING 2/7/22  Yes Juan Mckenzie MD   predniSONE (DELTASONE) 10 MG tablet 30 mg x 3 days, 20 mg x 3 days, 10 mg x 3 days then stop  Patient not taking: Reported on 2/7/2023 1/18/23   Lindsay Akins MD       REVIEW OF SYSTEMS:    All 14-point review of systems are completed and  pertinent positives are mentioned in the history of present illness. Other  systems are reviewed and are negative. Physical Examination:    /68   Ht 5' 2\" (1.575 m)   Wt 110 lb (49.9 kg) Comment: pt stated weight  SpO2 (!) 85%   BMI 20.12 kg/m²      Constitutional and General Appearance:    alert, cooperative, no distress, and appears stated age  [de-identified]:    PERRLA, no cervical lymphadenopathy. No masses palpable. Normal oral  mucosa  Respiratory:  Normal excursion and expansion without use of accessory muscles  Resp Auscultation: Normal breath sounds without dullness or wheezing  Cardiovascular: The apical impulse is not displaced  RRR without M/G/R  Abdomen:  No masses or tenderness  Bowel sounds present  Extremities:   No Cyanosis or Clubbing   Lower extremity edema: No  Skin: Warm and dry  Neurological:  Alert and oriented. Moves all extremities well  No abnormalities of mood, affect, memory, mentation, or behavior are noted    DATA:    ECG 2/7/23: Personally reviewed. Echo 5/2022    Summary   Technically difficult examination. Apical views are off axis secondary to pt   left ribs fracture. Left ventricular systolic function is normal with ejection fraction   estimated at 55%.    No regional wall motion abnormalities. There is mild concentric left ventricular hypertrophy. Indeterminate left ventricular filling pressure. Systolic pulmonary artery pressure (SPAP) is normal estimated at 22 mmHg   (Right atrial pressure of 8 mmHg). IMPRESSION:    11/28/2022  Mrs. Zoie Conklin is a pleasant but complex 69year old female with a medical history significant for heart failure with preserved ejection fraction, interstitial lung disease ( - [place]), hypertension, hypothyroidism, COPD, hypothyroidism, anemia secondary to internal hemorrhoids and colonic polyps, paroxysmal atrial fibrillation/flutter (patient unaware however daughter reports long history with short duration), microcytic anemia (low iron), recurrent spontaneous pneumothoraces, and diabetes mellitus type II who presents from home with acute on chronic shortness of breath thought to be driven by pneumothorax. Patient opted for no OAC and rate control. 2/7/2023  Patient presents for follow up. Unfortunately she didn't get monitor at time of discharge. We discussed CVA risk and Watchman in detail. Patient and family interested in this and so we will refer. We also discussed cardiac monitor to evaluate burden as she and family are interested in ablation or antiarrhythmic therapy. We will arrange for cardiac monitor. - 4 week cardiac monitor.  - Watchman referral.  - Continue nadolol.  - Follow up with EP NP in 3 months unless/until procedure/discussion required or PRN. RECOMMENDATIONS:  Discussed risks and benefits of anticoagulation to prevent stroke. Can consider Watchman device. Referral to [facility] clinic. Cardiac event monitor for 4 weeks. Follow up in 3 months with EP NP.    QUALITY MEASURES  1. Tobacco Cessation Counseling: NA  2. Retake of BP if >140/90:   NA  3. Documentation to PCP/referring for new patient:  Sent to PCP at close of office visit  4. CAD patient on anti-platelet: NA  5.  CAD patient on STATIN therapy:  Yes  6. Patient with CHF and aFib on anticoagulation:  No, family defers    All questions and concerns were addressed to the patient/family. Alternatives to my treatment were discussed. Dr. Duarte Davila MD  Electrophysiology  [facility]. [facility] [facility]. [address]. [place] [address]  Phone: [phone]  Fax: [phone]     NOTE: This report was transcribed using voice recognition software. Every effort was made to ensure accuracy, however, inadvertent computerized transcription errors may be present. Maria C Adams RN, am scribing for and in the presence of Dr. Tarun Muir. 02/07/23 9:20 AM   Alexia Turner RN    I reviewed with the resident the medical history and the resident's findings on the physical examination. I discussed with the resident the patient's diagnosis and concur with the plan.

## 2023-02-06 RX ORDER — LORATADINE/PSEUDOEPHEDRINE 5 MG-120MG
TABLET, EXTENDED RELEASE 12 HR ORAL
Qty: 60 TABLET | Refills: 5 | Status: ON HOLD | OUTPATIENT
Start: 2023-02-06 | End: 2023-02-22 | Stop reason: HOSPADM

## 2023-02-07 ENCOUNTER — OFFICE VISIT (OUTPATIENT)
Dept: CARDIOLOGY CLINIC | Age: 68
End: 2023-02-07
Payer: MEDICARE

## 2023-02-07 ENCOUNTER — TELEPHONE (OUTPATIENT)
Dept: CARDIOLOGY CLINIC | Age: 68
End: 2023-02-07

## 2023-02-07 VITALS
BODY MASS INDEX: 20.24 KG/M2 | DIASTOLIC BLOOD PRESSURE: 68 MMHG | HEIGHT: 62 IN | SYSTOLIC BLOOD PRESSURE: 120 MMHG | WEIGHT: 110 LBS | OXYGEN SATURATION: 85 %

## 2023-02-07 DIAGNOSIS — Z09 HOSPITAL DISCHARGE FOLLOW-UP: ICD-10-CM

## 2023-02-07 DIAGNOSIS — I47.1 PSVT (PAROXYSMAL SUPRAVENTRICULAR TACHYCARDIA) (HCC): ICD-10-CM

## 2023-02-07 DIAGNOSIS — I48.0 PAF (PAROXYSMAL ATRIAL FIBRILLATION) (HCC): Primary | ICD-10-CM

## 2023-02-07 DIAGNOSIS — I47.1 SVT (SUPRAVENTRICULAR TACHYCARDIA) (HCC): ICD-10-CM

## 2023-02-07 PROCEDURE — 3074F SYST BP LT 130 MM HG: CPT | Performed by: INTERNAL MEDICINE

## 2023-02-07 PROCEDURE — 93000 ELECTROCARDIOGRAM COMPLETE: CPT | Performed by: INTERNAL MEDICINE

## 2023-02-07 PROCEDURE — G8484 FLU IMMUNIZE NO ADMIN: HCPCS | Performed by: INTERNAL MEDICINE

## 2023-02-07 PROCEDURE — 3017F COLORECTAL CA SCREEN DOC REV: CPT | Performed by: INTERNAL MEDICINE

## 2023-02-07 PROCEDURE — 3078F DIAST BP <80 MM HG: CPT | Performed by: INTERNAL MEDICINE

## 2023-02-07 PROCEDURE — 1111F DSCHRG MED/CURRENT MED MERGE: CPT | Performed by: INTERNAL MEDICINE

## 2023-02-07 PROCEDURE — G8400 PT W/DXA NO RESULTS DOC: HCPCS | Performed by: INTERNAL MEDICINE

## 2023-02-07 PROCEDURE — 1090F PRES/ABSN URINE INCON ASSESS: CPT | Performed by: INTERNAL MEDICINE

## 2023-02-07 PROCEDURE — G8427 DOCREV CUR MEDS BY ELIG CLIN: HCPCS | Performed by: INTERNAL MEDICINE

## 2023-02-07 PROCEDURE — 1036F TOBACCO NON-USER: CPT | Performed by: INTERNAL MEDICINE

## 2023-02-07 PROCEDURE — 99214 OFFICE O/P EST MOD 30 MIN: CPT | Performed by: INTERNAL MEDICINE

## 2023-02-07 PROCEDURE — 1123F ACP DISCUSS/DSCN MKR DOCD: CPT | Performed by: INTERNAL MEDICINE

## 2023-02-07 PROCEDURE — G8420 CALC BMI NORM PARAMETERS: HCPCS | Performed by: INTERNAL MEDICINE

## 2023-02-07 NOTE — TELEPHONE ENCOUNTER
Monitor placed by Wiliam Carrera, RN  Monitor company [employer]  Length of monitor 28 days  Monitor ordered by [address] [place],[address]  BT ID: [id number]  Activation successful prior to pt leaving office?  Yes

## 2023-02-07 NOTE — PATIENT INSTRUCTIONS
RECOMMENDATIONS:  Discussed risks and benefits of anticoagulation to prevent stroke. Can consider Watchman device. Referral to [facility] clinic. Cardiac event monitor for 4 weeks. Follow up in 3 months with EP NP.

## 2023-02-07 NOTE — TELEPHONE ENCOUNTER
Pts daughter Adelso Damon returned call, pt has been scheduled for 02/24/2023 at 10:15 am w/AV at [facility].

## 2023-02-07 NOTE — TELEPHONE ENCOUNTER
Please call patient to set up for [facility] clinic per [address].

## 2023-02-07 NOTE — TELEPHONE ENCOUNTER
02/07-Called ([phone]) unable to make contact, LM for pt to return [facility] call and speak to Cate Zimmer.

## 2023-02-13 RX ORDER — OMEPRAZOLE 40 MG/1
40 CAPSULE, DELAYED RELEASE ORAL
Qty: 90 CAPSULE | Refills: 1 | Status: SHIPPED | OUTPATIENT
Start: 2023-02-13

## 2023-02-19 ENCOUNTER — HOSPITAL ENCOUNTER (INPATIENT)
Age: 68
LOS: 2 days | Discharge: HOME | DRG: 196 | End: 2023-02-22
Attending: STUDENT IN AN ORGANIZED HEALTH CARE EDUCATION/TRAINING PROGRAM | Admitting: INTERNAL MEDICINE
Payer: MEDICARE

## 2023-02-19 ENCOUNTER — APPOINTMENT (OUTPATIENT)
Dept: GENERAL RADIOLOGY | Age: 68
DRG: 196 | End: 2023-02-19
Payer: MEDICARE

## 2023-02-19 DIAGNOSIS — J96.21 ACUTE ON CHRONIC RESPIRATORY FAILURE WITH HYPOXIA (HCC): Primary | ICD-10-CM

## 2023-02-19 DIAGNOSIS — Z87.09 HISTORY OF COPD: ICD-10-CM

## 2023-02-19 DIAGNOSIS — E87.6 HYPOKALEMIA: ICD-10-CM

## 2023-02-19 DIAGNOSIS — E83.42 HYPOMAGNESEMIA: ICD-10-CM

## 2023-02-19 LAB
A/G RATIO: 1.1 (ref 1.1–2.2)
ALBUMIN SERPL-MCNC: 3.8 G/DL (ref 3.4–5)
ALP BLD-CCNC: 118 U/L (ref 40–129)
ALT SERPL-CCNC: 20 U/L (ref 10–40)
ANION GAP SERPL CALCULATED.3IONS-SCNC: 9 MMOL/L (ref 3–16)
AST SERPL-CCNC: 27 U/L (ref 15–37)
BASE EXCESS VENOUS: -7 MMOL/L (ref -3–3)
BASOPHILS ABSOLUTE: 0.1 K/UL (ref 0–0.2)
BASOPHILS RELATIVE PERCENT: 0.7 %
BILIRUB SERPL-MCNC: 0.3 MG/DL (ref 0–1)
BUN BLDV-MCNC: 10 MG/DL (ref 7–20)
CALCIUM SERPL-MCNC: 9.2 MG/DL (ref 8.3–10.6)
CARBOXYHEMOGLOBIN: 0.8 % (ref 0–1.5)
CHLORIDE BLD-SCNC: 104 MMOL/L (ref 99–110)
CO2: 31 MMOL/L (ref 21–32)
CREAT SERPL-MCNC: <0.5 MG/DL (ref 0.6–1.2)
D DIMER: <0.27 UG/ML FEU (ref 0–0.6)
EOSINOPHILS ABSOLUTE: 0.3 K/UL (ref 0–0.6)
EOSINOPHILS RELATIVE PERCENT: 3.5 %
GFR SERPL CREATININE-BSD FRML MDRD: >60 ML/MIN/{1.73_M2}
GLUCOSE BLD-MCNC: 104 MG/DL (ref 70–99)
HCO3 VENOUS: 18.2 MMOL/L (ref 23–29)
HCT VFR BLD CALC: 31.1 % (ref 36–48)
HEMOGLOBIN: 10 G/DL (ref 12–16)
LYMPHOCYTES ABSOLUTE: 1.5 K/UL (ref 1–5.1)
LYMPHOCYTES RELATIVE PERCENT: 18.2 %
MAGNESIUM: 1.7 MG/DL (ref 1.8–2.4)
MCH RBC QN AUTO: 25.5 PG (ref 26–34)
MCHC RBC AUTO-ENTMCNC: 32 G/DL (ref 31–36)
MCV RBC AUTO: 79.7 FL (ref 80–100)
METHEMOGLOBIN VENOUS: 0 %
MONOCYTES ABSOLUTE: 0.4 K/UL (ref 0–1.3)
MONOCYTES RELATIVE PERCENT: 5.1 %
NEUTROPHILS ABSOLUTE: 6.1 K/UL (ref 1.7–7.7)
NEUTROPHILS RELATIVE PERCENT: 72.5 %
O2 SAT, VEN: 91 %
O2 THERAPY: ABNORMAL
PCO2, VEN: 35.3 MMHG (ref 40–50)
PDW BLD-RTO: 15.9 % (ref 12.4–15.4)
PH VENOUS: 7.33 (ref 7.35–7.45)
PLATELET # BLD: 183 K/UL (ref 135–450)
PMV BLD AUTO: 7.9 FL (ref 5–10.5)
PO2, VEN: 63 MMHG (ref 25–40)
POTASSIUM REFLEX MAGNESIUM: 3.4 MMOL/L (ref 3.5–5.1)
PRO-BNP: 254 PG/ML (ref 0–124)
RAPID INFLUENZA  B AGN: NEGATIVE
RAPID INFLUENZA A AGN: NEGATIVE
RBC # BLD: 3.91 M/UL (ref 4–5.2)
SARS-COV-2, NAAT: NOT DETECTED
SODIUM BLD-SCNC: 144 MMOL/L (ref 136–145)
TCO2 CALC VENOUS: 19 MMOL/L
TOTAL PROTEIN: 7.2 G/DL (ref 6.4–8.2)
TROPONIN: <0.01 NG/ML
WBC # BLD: 8.5 K/UL (ref 4–11)

## 2023-02-19 PROCEDURE — 71045 X-RAY EXAM CHEST 1 VIEW: CPT

## 2023-02-19 PROCEDURE — 87635 SARS-COV-2 COVID-19 AMP PRB: CPT

## 2023-02-19 PROCEDURE — 84484 ASSAY OF TROPONIN QUANT: CPT

## 2023-02-19 PROCEDURE — 36415 COLL VENOUS BLD VENIPUNCTURE: CPT

## 2023-02-19 PROCEDURE — 83735 ASSAY OF MAGNESIUM: CPT

## 2023-02-19 PROCEDURE — 83880 ASSAY OF NATRIURETIC PEPTIDE: CPT

## 2023-02-19 PROCEDURE — 96365 THER/PROPH/DIAG IV INF INIT: CPT

## 2023-02-19 PROCEDURE — 6360000002 HC RX W HCPCS: Performed by: STUDENT IN AN ORGANIZED HEALTH CARE EDUCATION/TRAINING PROGRAM

## 2023-02-19 PROCEDURE — 83605 ASSAY OF LACTIC ACID: CPT

## 2023-02-19 PROCEDURE — 85379 FIBRIN DEGRADATION QUANT: CPT

## 2023-02-19 PROCEDURE — 82803 BLOOD GASES ANY COMBINATION: CPT

## 2023-02-19 PROCEDURE — 99285 EMERGENCY DEPT VISIT HI MDM: CPT

## 2023-02-19 PROCEDURE — 87804 INFLUENZA ASSAY W/OPTIC: CPT

## 2023-02-19 PROCEDURE — 6370000000 HC RX 637 (ALT 250 FOR IP): Performed by: STUDENT IN AN ORGANIZED HEALTH CARE EDUCATION/TRAINING PROGRAM

## 2023-02-19 PROCEDURE — 85025 COMPLETE CBC W/AUTO DIFF WBC: CPT

## 2023-02-19 PROCEDURE — 96366 THER/PROPH/DIAG IV INF ADDON: CPT

## 2023-02-19 PROCEDURE — 84145 PROCALCITONIN (PCT): CPT

## 2023-02-19 PROCEDURE — 93005 ELECTROCARDIOGRAM TRACING: CPT | Performed by: STUDENT IN AN ORGANIZED HEALTH CARE EDUCATION/TRAINING PROGRAM

## 2023-02-19 PROCEDURE — 80053 COMPREHEN METABOLIC PANEL: CPT

## 2023-02-19 RX ORDER — MAGNESIUM SULFATE 1 G/100ML
1000 INJECTION INTRAVENOUS ONCE
Status: COMPLETED | OUTPATIENT
Start: 2023-02-19 | End: 2023-02-20

## 2023-02-19 RX ORDER — METHOCARBAMOL 750 MG/1
TABLET, FILM COATED ORAL
Status: ON HOLD | COMMUNITY
Start: 2023-02-10 | End: 2023-02-22 | Stop reason: HOSPADM

## 2023-02-19 RX ORDER — POTASSIUM CHLORIDE 750 MG/1
40 TABLET, EXTENDED RELEASE ORAL ONCE
Status: COMPLETED | OUTPATIENT
Start: 2023-02-19 | End: 2023-02-19

## 2023-02-19 RX ADMIN — MAGNESIUM SULFATE HEPTAHYDRATE 1000 MG: 1 INJECTION, SOLUTION INTRAVENOUS at 22:42

## 2023-02-19 RX ADMIN — POTASSIUM CHLORIDE 40 MEQ: 750 TABLET, EXTENDED RELEASE ORAL at 23:22

## 2023-02-19 SDOH — ECONOMIC STABILITY: FOOD INSECURITY: WITHIN THE PAST 12 MONTHS, THE FOOD YOU BOUGHT JUST DIDN'T LAST AND YOU DIDN'T HAVE MONEY TO GET MORE.: NEVER TRUE

## 2023-02-19 ASSESSMENT — PAIN - FUNCTIONAL ASSESSMENT: PAIN_FUNCTIONAL_ASSESSMENT: NONE - DENIES PAIN

## 2023-02-19 ASSESSMENT — LIFESTYLE VARIABLES
HOW OFTEN DO YOU HAVE A DRINK CONTAINING ALCOHOL: NEVER
HOW MANY STANDARD DRINKS CONTAINING ALCOHOL DO YOU HAVE ON A TYPICAL DAY: PATIENT DOES NOT DRINK

## 2023-02-20 PROBLEM — Z87.09 HISTORY OF COPD: Status: ACTIVE | Noted: 2023-02-20

## 2023-02-20 LAB
A/G RATIO: 1.1 (ref 1.1–2.2)
ALBUMIN SERPL-MCNC: 3.9 G/DL (ref 3.4–5)
ALP BLD-CCNC: 128 U/L (ref 40–129)
ALT SERPL-CCNC: 20 U/L (ref 10–40)
ANION GAP SERPL CALCULATED.3IONS-SCNC: 10 MMOL/L (ref 3–16)
AST SERPL-CCNC: 32 U/L (ref 15–37)
BASOPHILS ABSOLUTE: 0 K/UL (ref 0–0.2)
BASOPHILS RELATIVE PERCENT: 0.4 %
BILIRUB SERPL-MCNC: 0.4 MG/DL (ref 0–1)
BUN BLDV-MCNC: 9 MG/DL (ref 7–20)
CALCIUM SERPL-MCNC: 9.6 MG/DL (ref 8.3–10.6)
CHLORIDE BLD-SCNC: 101 MMOL/L (ref 99–110)
CO2: 29 MMOL/L (ref 21–32)
CREAT SERPL-MCNC: <0.5 MG/DL (ref 0.6–1.2)
EKG ATRIAL RATE: 103 BPM
EKG DIAGNOSIS: NORMAL
EKG P AXIS: 5 DEGREES
EKG P-R INTERVAL: 150 MS
EKG Q-T INTERVAL: 366 MS
EKG QRS DURATION: 70 MS
EKG QTC CALCULATION (BAZETT): 479 MS
EKG R AXIS: 118 DEGREES
EKG T AXIS: 27 DEGREES
EKG VENTRICULAR RATE: 103 BPM
EOSINOPHILS ABSOLUTE: 0 K/UL (ref 0–0.6)
EOSINOPHILS RELATIVE PERCENT: 0.4 %
GFR SERPL CREATININE-BSD FRML MDRD: >60 ML/MIN/{1.73_M2}
GLUCOSE BLD-MCNC: 160 MG/DL (ref 70–99)
GLUCOSE BLD-MCNC: 217 MG/DL (ref 70–99)
HCT VFR BLD CALC: 32.3 % (ref 36–48)
HEMOGLOBIN: 10.3 G/DL (ref 12–16)
LACTIC ACID: 1.7 MMOL/L (ref 0.4–2)
LYMPHOCYTES ABSOLUTE: 0.6 K/UL (ref 1–5.1)
LYMPHOCYTES RELATIVE PERCENT: 7.8 %
MCH RBC QN AUTO: 25.5 PG (ref 26–34)
MCHC RBC AUTO-ENTMCNC: 32 G/DL (ref 31–36)
MCV RBC AUTO: 79.7 FL (ref 80–100)
MONOCYTES ABSOLUTE: 0 K/UL (ref 0–1.3)
MONOCYTES RELATIVE PERCENT: 0.5 %
NEUTROPHILS ABSOLUTE: 6.9 K/UL (ref 1.7–7.7)
NEUTROPHILS RELATIVE PERCENT: 90.9 %
PDW BLD-RTO: 16 % (ref 12.4–15.4)
PERFORMED ON: ABNORMAL
PLATELET # BLD: 200 K/UL (ref 135–450)
PMV BLD AUTO: 7.7 FL (ref 5–10.5)
POTASSIUM REFLEX MAGNESIUM: 5.1 MMOL/L (ref 3.5–5.1)
PROCALCITONIN: 0.06 NG/ML (ref 0–0.15)
RBC # BLD: 4.05 M/UL (ref 4–5.2)
SODIUM BLD-SCNC: 140 MMOL/L (ref 136–145)
TOTAL PROTEIN: 7.4 G/DL (ref 6.4–8.2)
WBC # BLD: 7.6 K/UL (ref 4–11)

## 2023-02-20 PROCEDURE — 6360000002 HC RX W HCPCS: Performed by: INTERNAL MEDICINE

## 2023-02-20 PROCEDURE — 6370000000 HC RX 637 (ALT 250 FOR IP): Performed by: INTERNAL MEDICINE

## 2023-02-20 PROCEDURE — 93010 ELECTROCARDIOGRAM REPORT: CPT | Performed by: INTERNAL MEDICINE

## 2023-02-20 PROCEDURE — 94640 AIRWAY INHALATION TREATMENT: CPT

## 2023-02-20 PROCEDURE — 2060000000 HC ICU INTERMEDIATE R&B

## 2023-02-20 PROCEDURE — 36415 COLL VENOUS BLD VENIPUNCTURE: CPT

## 2023-02-20 PROCEDURE — 2700000000 HC OXYGEN THERAPY PER DAY

## 2023-02-20 PROCEDURE — 94761 N-INVAS EAR/PLS OXIMETRY MLT: CPT

## 2023-02-20 PROCEDURE — 99223 1ST HOSP IP/OBS HIGH 75: CPT

## 2023-02-20 PROCEDURE — 85025 COMPLETE CBC W/AUTO DIFF WBC: CPT

## 2023-02-20 PROCEDURE — 80053 COMPREHEN METABOLIC PANEL: CPT

## 2023-02-20 PROCEDURE — 2580000003 HC RX 258: Performed by: INTERNAL MEDICINE

## 2023-02-20 RX ORDER — IPRATROPIUM BROMIDE AND ALBUTEROL SULFATE 2.5; .5 MG/3ML; MG/3ML
1 SOLUTION RESPIRATORY (INHALATION) 4 TIMES DAILY
Status: DISCONTINUED | OUTPATIENT
Start: 2023-02-21 | End: 2023-02-22 | Stop reason: HOSPADM

## 2023-02-20 RX ORDER — INSULIN LISPRO 100 [IU]/ML
0-4 INJECTION, SOLUTION INTRAVENOUS; SUBCUTANEOUS
Status: DISCONTINUED | OUTPATIENT
Start: 2023-02-21 | End: 2023-02-22 | Stop reason: HOSPADM

## 2023-02-20 RX ORDER — DILTIAZEM HYDROCHLORIDE 240 MG/1
240 CAPSULE, COATED, EXTENDED RELEASE ORAL DAILY
Status: DISCONTINUED | OUTPATIENT
Start: 2023-02-20 | End: 2023-02-22 | Stop reason: HOSPADM

## 2023-02-20 RX ORDER — LORAZEPAM 0.5 MG/1
0.5 TABLET ORAL EVERY 6 HOURS PRN
Status: DISCONTINUED | OUTPATIENT
Start: 2023-02-20 | End: 2023-02-22 | Stop reason: HOSPADM

## 2023-02-20 RX ORDER — ACETAMINOPHEN 650 MG/1
650 SUPPOSITORY RECTAL EVERY 6 HOURS PRN
Status: DISCONTINUED | OUTPATIENT
Start: 2023-02-20 | End: 2023-02-22 | Stop reason: HOSPADM

## 2023-02-20 RX ORDER — DEXTROSE MONOHYDRATE 100 MG/ML
INJECTION, SOLUTION INTRAVENOUS CONTINUOUS PRN
Status: DISCONTINUED | OUTPATIENT
Start: 2023-02-20 | End: 2023-02-22 | Stop reason: HOSPADM

## 2023-02-20 RX ORDER — TRAMADOL HYDROCHLORIDE 50 MG/1
50 TABLET ORAL 2 TIMES DAILY PRN
Status: DISCONTINUED | OUTPATIENT
Start: 2023-02-20 | End: 2023-02-22 | Stop reason: HOSPADM

## 2023-02-20 RX ORDER — SODIUM CHLORIDE 0.9 % (FLUSH) 0.9 %
10 SYRINGE (ML) INJECTION PRN
Status: DISCONTINUED | OUTPATIENT
Start: 2023-02-20 | End: 2023-02-22 | Stop reason: HOSPADM

## 2023-02-20 RX ORDER — SODIUM CHLORIDE 0.9 % (FLUSH) 0.9 %
10 SYRINGE (ML) INJECTION EVERY 12 HOURS SCHEDULED
Status: DISCONTINUED | OUTPATIENT
Start: 2023-02-20 | End: 2023-02-22 | Stop reason: HOSPADM

## 2023-02-20 RX ORDER — PANTOPRAZOLE SODIUM 40 MG/1
40 TABLET, DELAYED RELEASE ORAL
Status: DISCONTINUED | OUTPATIENT
Start: 2023-02-21 | End: 2023-02-22 | Stop reason: HOSPADM

## 2023-02-20 RX ORDER — ATORVASTATIN CALCIUM 40 MG/1
40 TABLET, FILM COATED ORAL DAILY
Status: DISCONTINUED | OUTPATIENT
Start: 2023-02-20 | End: 2023-02-22 | Stop reason: HOSPADM

## 2023-02-20 RX ORDER — LEVOTHYROXINE SODIUM 0.12 MG/1
125 TABLET ORAL DAILY
Status: DISCONTINUED | OUTPATIENT
Start: 2023-02-20 | End: 2023-02-22 | Stop reason: HOSPADM

## 2023-02-20 RX ORDER — HYDROXYZINE HYDROCHLORIDE 10 MG/1
10 TABLET, FILM COATED ORAL 3 TIMES DAILY PRN
COMMUNITY

## 2023-02-20 RX ORDER — IPRATROPIUM BROMIDE AND ALBUTEROL SULFATE 2.5; .5 MG/3ML; MG/3ML
1 SOLUTION RESPIRATORY (INHALATION) EVERY 4 HOURS PRN
Status: DISCONTINUED | OUTPATIENT
Start: 2023-02-20 | End: 2023-02-22 | Stop reason: HOSPADM

## 2023-02-20 RX ORDER — INSULIN LISPRO 100 [IU]/ML
0-4 INJECTION, SOLUTION INTRAVENOUS; SUBCUTANEOUS NIGHTLY
Status: DISCONTINUED | OUTPATIENT
Start: 2023-02-20 | End: 2023-02-22 | Stop reason: HOSPADM

## 2023-02-20 RX ORDER — SODIUM CHLORIDE 9 MG/ML
INJECTION, SOLUTION INTRAVENOUS CONTINUOUS
Status: DISCONTINUED | OUTPATIENT
Start: 2023-02-20 | End: 2023-02-21

## 2023-02-20 RX ORDER — NADOLOL 40 MG/1
20 TABLET ORAL DAILY
Status: DISCONTINUED | OUTPATIENT
Start: 2023-02-20 | End: 2023-02-22 | Stop reason: HOSPADM

## 2023-02-20 RX ORDER — ALBUTEROL SULFATE 2.5 MG/3ML
2.5 SOLUTION RESPIRATORY (INHALATION) EVERY 6 HOURS PRN
Status: DISCONTINUED | OUTPATIENT
Start: 2023-02-20 | End: 2023-02-22 | Stop reason: HOSPADM

## 2023-02-20 RX ORDER — BENZONATATE 100 MG/1
100 CAPSULE ORAL EVERY 4 HOURS PRN
Status: DISCONTINUED | OUTPATIENT
Start: 2023-02-20 | End: 2023-02-22 | Stop reason: HOSPADM

## 2023-02-20 RX ORDER — SODIUM CHLORIDE 9 MG/ML
INJECTION, SOLUTION INTRAVENOUS PRN
Status: DISCONTINUED | OUTPATIENT
Start: 2023-02-20 | End: 2023-02-22 | Stop reason: HOSPADM

## 2023-02-20 RX ORDER — ONDANSETRON 2 MG/ML
4 INJECTION INTRAMUSCULAR; INTRAVENOUS EVERY 6 HOURS PRN
Status: DISCONTINUED | OUTPATIENT
Start: 2023-02-20 | End: 2023-02-22 | Stop reason: HOSPADM

## 2023-02-20 RX ORDER — GUAIFENESIN 600 MG/1
600 TABLET, EXTENDED RELEASE ORAL PRN
COMMUNITY

## 2023-02-20 RX ORDER — METHYLPREDNISOLONE SODIUM SUCCINATE 125 MG/2ML
125 INJECTION, POWDER, LYOPHILIZED, FOR SOLUTION INTRAMUSCULAR; INTRAVENOUS ONCE
Status: COMPLETED | OUTPATIENT
Start: 2023-02-20 | End: 2023-02-20

## 2023-02-20 RX ORDER — SERTRALINE HYDROCHLORIDE 100 MG/1
200 TABLET, FILM COATED ORAL DAILY
Status: DISCONTINUED | OUTPATIENT
Start: 2023-02-20 | End: 2023-02-22 | Stop reason: HOSPADM

## 2023-02-20 RX ORDER — METHYLPREDNISOLONE SODIUM SUCCINATE 40 MG/ML
40 INJECTION, POWDER, LYOPHILIZED, FOR SOLUTION INTRAMUSCULAR; INTRAVENOUS EVERY 12 HOURS
Status: COMPLETED | OUTPATIENT
Start: 2023-02-20 | End: 2023-02-22

## 2023-02-20 RX ORDER — POLYETHYLENE GLYCOL 3350 17 G/17G
17 POWDER, FOR SOLUTION ORAL DAILY PRN
Status: DISCONTINUED | OUTPATIENT
Start: 2023-02-20 | End: 2023-02-22 | Stop reason: HOSPADM

## 2023-02-20 RX ORDER — IPRATROPIUM BROMIDE AND ALBUTEROL SULFATE 2.5; .5 MG/3ML; MG/3ML
1 SOLUTION RESPIRATORY (INHALATION)
Status: DISCONTINUED | OUTPATIENT
Start: 2023-02-20 | End: 2023-02-20

## 2023-02-20 RX ORDER — MAGNESIUM SULFATE 1 G/100ML
1000 INJECTION INTRAVENOUS ONCE
Status: COMPLETED | OUTPATIENT
Start: 2023-02-20 | End: 2023-02-20

## 2023-02-20 RX ORDER — ACETAMINOPHEN 325 MG/1
650 TABLET ORAL EVERY 6 HOURS PRN
Status: DISCONTINUED | OUTPATIENT
Start: 2023-02-20 | End: 2023-02-22 | Stop reason: HOSPADM

## 2023-02-20 RX ORDER — PREDNISONE 20 MG/1
40 TABLET ORAL DAILY
Status: DISCONTINUED | OUTPATIENT
Start: 2023-02-22 | End: 2023-02-22

## 2023-02-20 RX ORDER — MIRTAZAPINE 30 MG/1
30 TABLET, FILM COATED ORAL NIGHTLY
Status: DISCONTINUED | OUTPATIENT
Start: 2023-02-20 | End: 2023-02-22 | Stop reason: HOSPADM

## 2023-02-20 RX ORDER — ENOXAPARIN SODIUM 100 MG/ML
30 INJECTION SUBCUTANEOUS DAILY
Status: DISCONTINUED | OUTPATIENT
Start: 2023-02-20 | End: 2023-02-22 | Stop reason: HOSPADM

## 2023-02-20 RX ORDER — ONDANSETRON 4 MG/1
4 TABLET, ORALLY DISINTEGRATING ORAL EVERY 8 HOURS PRN
Status: DISCONTINUED | OUTPATIENT
Start: 2023-02-20 | End: 2023-02-22 | Stop reason: HOSPADM

## 2023-02-20 RX ADMIN — MAGNESIUM SULFATE HEPTAHYDRATE 1000 MG: 1 INJECTION, SOLUTION INTRAVENOUS at 01:46

## 2023-02-20 RX ADMIN — MIRTAZAPINE 30 MG: 30 TABLET, FILM COATED ORAL at 21:58

## 2023-02-20 RX ADMIN — METHYLPREDNISOLONE SODIUM SUCCINATE 125 MG: 125 INJECTION, POWDER, FOR SOLUTION INTRAMUSCULAR; INTRAVENOUS at 01:42

## 2023-02-20 RX ADMIN — METHYLPREDNISOLONE SODIUM SUCCINATE 40 MG: 40 INJECTION, POWDER, FOR SOLUTION INTRAMUSCULAR; INTRAVENOUS at 23:35

## 2023-02-20 RX ADMIN — IPRATROPIUM BROMIDE AND ALBUTEROL SULFATE 1 AMPULE: .5; 2.5 SOLUTION RESPIRATORY (INHALATION) at 13:04

## 2023-02-20 RX ADMIN — METHYLPREDNISOLONE SODIUM SUCCINATE 40 MG: 40 INJECTION, POWDER, FOR SOLUTION INTRAMUSCULAR; INTRAVENOUS at 16:56

## 2023-02-20 RX ADMIN — IPRATROPIUM BROMIDE AND ALBUTEROL SULFATE 1 AMPULE: .5; 2.5 SOLUTION RESPIRATORY (INHALATION) at 16:56

## 2023-02-20 RX ADMIN — ENOXAPARIN SODIUM 30 MG: 100 INJECTION SUBCUTANEOUS at 16:55

## 2023-02-20 RX ADMIN — BENZONATATE 100 MG: 100 CAPSULE ORAL at 23:35

## 2023-02-20 RX ADMIN — AZITHROMYCIN DIHYDRATE 500 MG: 500 INJECTION, POWDER, LYOPHILIZED, FOR SOLUTION INTRAVENOUS at 02:53

## 2023-02-20 RX ADMIN — IPRATROPIUM BROMIDE AND ALBUTEROL SULFATE 1 AMPULE: .5; 2.5 SOLUTION RESPIRATORY (INHALATION) at 21:49

## 2023-02-20 RX ADMIN — SODIUM CHLORIDE: 9 INJECTION, SOLUTION INTRAVENOUS at 06:31

## 2023-02-20 RX ADMIN — SODIUM CHLORIDE: 9 INJECTION, SOLUTION INTRAVENOUS at 21:38

## 2023-02-20 ASSESSMENT — PAIN - FUNCTIONAL ASSESSMENT
PAIN_FUNCTIONAL_ASSESSMENT: NONE - DENIES PAIN

## 2023-02-20 NOTE — ED NOTES
Pt stated she needed to use restroom. Bedside commode brought to room and pt took 1 step and sat on bedside commode. Oxygen saturation dropped to 88% upon getting up. Pt sat and used restroom and assisted pt with wiping. Upon getting up and pulling up pants oxygen continued to drop to 78%. Pt started stating she was feeling very lightheaded, put pt back in bed and oxygen continued to drop to 65%. Increased to 10L via high flow nasal canula. Pt continued to state she cannot breathe. Sat with pt and made her comfortable and oxygen made it back up to 92%. Notified pt that I need her to stay because it is not safe for her to go home with her oxygen dropping that much. Pt is in agreement upon staying. Notified Dr. Linsey Carballo at this time of home much pt oxygen dropped upon standing and using restroom. Stated she would work on getting pt admitted to [facility]. Pt and family in agreement.      Riley Nagy, RN  02/20/23 [id number]

## 2023-02-20 NOTE — CONSULTS
[facility]                                 Pulmonary Consult /Progress Note :                                                                  CHIEF COMPLAINT: SOB  Reason ILD with acute exacerbation. Hypoxia acute on chronic.         HPI:   Patient is 57-year-old female with extensive work-up for interstitial lung disease that include bronchoscopy with EBUS as well as cryobiopsy and reviewing her records showing most likely organizing pneumonia along with possibility of nonspecific interstitial pneumonia    She used to follow with Dr. Kishor Wilson and she was placed on pirfenidone    The patient denies any history of previously smoking    She uses 3 L of home oxygen and she noticed that her oxygen requirement has been increasing over the last few days along with shortness of breath and dyspnea on exertion    During the admission ,She was a placed on steroids in the past however she has not been taking any steroids for more than a year    Denies any sputum, most of her cough dry, denies any leg swelling, denies any history of travel or COVID exposure and more short winded    Denies any fever chills chest pain no hemoptysis    Today Visit    She was admitted end of November for covid and Pneumothorax  She was sent to [facility] for pleurodesis however pneumothorax was improved and she left home on pneumostat  that was removed on 12/13  She is on 4 L  She on esbriet   She  is doing fine  Use Albuterol        Past Medical History:   Diagnosis Date    A-fib ([address]     Anxiety     Cryptogenic organizing pneumonia ([address]     Depression     GERD (gastroesophageal reflux disease)     Hyperlipidemia     On home oxygen therapy     uses O2 3L prn    Rash     Thyroid disease     hypothyroidism       Past Surgical History:        Procedure Laterality Date    ARM SURGERY Left 3/2/2020    LEFT ULNAR NERVE DECOMPRESSION AT THE ELBOW performed by Hunter Hernandez MD at [address] [place] [address] OR    BRONCHOSCOPY N/A 6/27/2019    EBUS WF W/ANES. performed by Joaquin Tate MD at [facility]  6/27/2019    BRONCHOSCOPY/TRANSBRONCHIAL NEEDLE BIOPSY performed by Joaquin Tate MD at [facility]  6/27/2019    BRONCHOSCOPY/TRANSBRONCHIAL LUNG BIOPSY performed by Joaquin Tate MD at [facility]  6/27/2019    BRONCHOSCOPY ALVEOLAR LAVAGE performed by Joaquin Tate MD at [facility]  07/10/2019    BRONCHOSCOPY N/A 7/10/2019    BRONCHOSCOPY ALVEOLAR LAVAGE performed by Raciel Beasley MD at [facility] Bilateral 08/06/2019    BRONCHOSCOPY N/A 8/6/2019    BRONCHOSCOPY ALVEOLAR LAVAGE performed by Amanda Mccray MD at [facility] N/A 12/24/2019    BRONCHOSCOPY ALVEOLAR LAVAGE performed by Daniel Sewell MD at [address] [place] [address] N/A 8/25/2022    COLONOSCOPY POLYPECTOMY SNARE/COLD BIOPSY [facility] F[facility]  9/15/2022    CT GUIDED CHEST TUBE 9/15/2022 [address] SCAN    CT INSERT CATH PLEURA W IMAGE  11/28/2022    CT INSERT CATH PLEURA W IMAGE 11/28/2022 Pedro Vicente MD [facility] CT SCAN    HYSTERECTOMY, TOTAL ABDOMINAL (CERVIX REMOVED)      partial       Allergies:  is allergic to penicillins, cefuroxime, doxycycline, imitrex [sumatriptan], meperidine, sulfa antibiotics, keflex [cephalexin], and tape [adhesive tape]. Social History:    TOBACCO:   reports that she has never smoked. She has never used smokeless tobacco.  ETOH:   reports no history of alcohol use.       Family History:       Problem Relation Age of Onset    Diabetes Mother     High Blood Pressure Mother     Asthma Sister     Other Father        Current Medications:    Current Facility-Administered Medications:     ipratropium-albuterol (DUONEB) nebulizer solution 1 ampule, 1 ampule, Inhalation, Q4H WA, Ananth Rodriguez MD    0.9 % sodium chloride infusion, , IntraVENous, Continuous, Ad Zhu MD, Last Rate: 75 mL/hr at 02/20/23 0631, New Bag at 02/20/23 0631    azithromycin (ZITHROMAX) 500 mg in 250 mL addavial, 500 mg, IntraVENous, Q24H, Ad Zhu MD, Stopped at 02/20/23 0087    Current Outpatient Medications:     methocarbamol (ROBAXIN) 750 MG tablet, TAKE 1 TABLET BY MOUTH THREE TIMES A DAY, Disp: , Rfl:     omeprazole (PRILOSEC) 40 MG delayed release capsule, Take 1 capsule by mouth every morning (before breakfast), Disp: 90 capsule, Rfl: 1    [employer] ALLERGY RELIEF-D12 5-120 MG per extended release tablet, TAKE 1 TABLET BY MOUTH TWICE A DAY, Disp: 60 tablet, Rfl: 5    ondansetron (ZOFRAN) 4 MG tablet, Take 1 tablet by mouth 3 times daily as needed for Nausea or Vomiting, Disp: 30 tablet, Rfl: 2    levothyroxine (SYNTHROID) 125 MCG tablet, Take 1 tablet by mouth Daily TAKE 1 TABLET BY MOUTH EVERY DAY, Disp: 90 tablet, Rfl: 1    benzonatate (TESSALON) 200 MG capsule, TAKE 1 CAPSULE BY MOUTH 3 TIMES A DAY AS NEEDED FOR COUGH, Disp: 90 capsule, Rfl: 5    nadolol (CORGARD) 20 MG tablet, Take 1 tablet by mouth daily, Disp: 30 tablet, Rfl: 3    furosemide (LASIX) 40 MG tablet, Take 0.5 tablets by mouth See Admin Instructions Daily prn for edema, Disp: 60 tablet, Rfl: 3    albuterol sulfate HFA (PROVENTIL;VENTOLIN;PROAIR) 108 (90 Base) MCG/ACT inhaler, INHALE 2 PUFFS INTO THE LUNGS EVERY 6 HOURS AS NEEDED FOR WHEEZING, Disp: 3 each, Rfl: 1    atorvastatin (LIPITOR) 40 MG tablet, TAKE 1 TABLET EVERY DAY, Disp: 90 tablet, Rfl: 1    dilTIAZem (CARDIZEM CD) 240 MG extended release capsule, Take 1 capsule by mouth daily, Disp: 30 capsule, Rfl: 2    Multiple Vitamins-Minerals (WOMENS ONE DAILY PO), Take 1 tablet by mouth daily, Disp: , Rfl:     pantoprazole (PROTONIX) 40 MG tablet, TAKE 1 TABLET EVERY MORNING BEFORE BREAKFAST, Disp: 90 tablet, Rfl: 1    sertraline (ZOLOFT) 100 MG tablet, TAKE 2 TABLETS EVERY DAY, Disp: 180 tablet, Rfl: 1 mirtazapine (REMERON) 30 MG tablet, TAKE 1 TABLET EVERY NIGHT, Disp: 90 tablet, Rfl: 1    Pirfenidone 267 MG TABS, pirfenidone daily for a week and then BID for a week and then back to TID., Disp: 270 tablet, Rfl: 3    LORazepam (ATIVAN) 0.5 MG tablet, Take 0.5 mg by mouth every 6 hours as needed for Anxiety. , Disp: , Rfl:     traMADol (ULTRAM) 50 MG tablet, Take 50 mg by mouth 2 times daily as needed for Pain., Disp: , Rfl:     albuterol (PROVENTIL) (2.5 MG/3ML) 0.083% nebulizer solution, INHALE THE CONTENTS OF 1 VIAL VIA NEBULIZER EVERY 6 HOURS AS NEEDED FOR WHEEZING, Disp: 720 mL, Rfl: 1      REVIEW OF SYSTEMS:  Constitutional: Negative for fever  HENT: Negative for sore throat  Eyes: Negative for redness   Respiratory: + for dyspnea, cough  Cardiovascular: Negative for chest pain  Gastrointestinal: Negative for vomiting, diarrhea   Genitourinary: Negative for hematuria   Musculoskeletal: Negative for arthralgias   Skin: Negative for rash  Neurological: Negative for syncope  Hematological: Negative for adenopathy  Psychiatric/Behavorial: Negative for anxiety      Objective:   PHYSICAL EXAM:    Blood pressure 131/72, pulse 85, temperature 97.9 °F (36.6 °C), resp. rate 16, height 5' 2\" (1.575 m), weight 110 lb (49.9 kg), SpO2 97 %, not currently breastfeeding.' on RA  Gen: No distress. Eyes: PERRL. No sclera icterus. No conjunctival injection. ENT: No discharge. Pharynx clear. Neck: Trachea midline. No obvious mass. Resp: Rhonchi bilaterally no clear Rales   CV: Regular rate. Regular rhythm. No murmur or rub. No edema. GI: Non-tender. Non-distended. No hernia. Skin: Warm and dry. No nodule on exposed extremities. Lymph: No cervical LAD. No supraclavicular LAD. M/S: No cyanosis. No joint deformity. No clubbing. Neuro: Awake. Alert. Moves all four extremities. Psych: Oriented x 3. No anxiety.            DATA reviewed by me:   CXR  CT       LABS/IMAGING:    CBC:  Lab Results   Component Value Date WBC 7.6 02/20/2023    HGB 10.3 (L) 02/20/2023    HCT 32.3 (L) 02/20/2023    MCV 79.7 (L) 02/20/2023     02/20/2023    LYMPHOPCT 7.8 02/20/2023    RBC 4.05 02/20/2023    MCH 25.5 (L) 02/20/2023    MCHC 32.0 02/20/2023    RDW 16.0 (H) 02/20/2023    NEUTOPHILPCT 90.9 02/20/2023    MONOPCT 0.5 02/20/2023    BASOPCT 0.4 02/20/2023    NEUTROABS 6.9 02/20/2023    LYMPHSABS 0.6 (L) 02/20/2023    MONOSABS 0.0 02/20/2023    EOSABS 0.0 02/20/2023    BASOSABS 0.0 02/20/2023       Recent Labs     02/20/23  0645 02/19/23 2122   WBC 7.6 8.5   HGB 10.3* 10.0*   HCT 32.3* 31.1*   MCV 79.7* 79.7*    183       BMP:   Recent Labs     02/19/23 2122 02/20/23  0645    140   K 3.4* 5.1    101   CO2 31 29   BUN 10 9   CREATININE <0.5* <0.5*         MG:   Lab Results   Component Value Date/Time    MG 1.70 02/19/2023 09:22 PM     Ca/Phos:   Lab Results   Component Value Date    CALCIUM 9.6 02/20/2023    PHOS 4.6 12/08/2022     LIVER PROFILE:   Recent Labs     02/19/23 2122 02/20/23  0645   AST 27 32   ALT 20 20   BILITOT 0.3 0.4   ALKPHOS 118 128         PT/INR: No results for input(s): PROTIME, INR in the last 72 hours. APTT: No results for input(s): APTT in the last 72 hours.     Cardiac Enzymes:  Lab Results   Component Value Date    CKTOTAL 23 (L) 07/10/2019    TROPONINI <0.01 02/19/2023       Assessment:       -Acute Hypoxic resp failureb   ILD coexisting NSIP or chronic HP.    -Acute exacerbation of ILD   -History of COPD  -Chronic respiratory failure-      Plan:      *- On pirfenidone   *s/p Pneumothostat with recurrent pneumo,CTS felt no pleurodeisis needed   *- on albuterol as needed  *- will get CXR   *- will see in 3 months   She was asked to go ER with any worsen SOB or P      Thank you very much for allowing me to participate in the care of this pleasant patient , should you have any questions ,please do not hesitate to contact me      Ani Kaplan MD,[facility]  Pulmonary&Critical Care Medicine Professor [facility]    NOTE: This report was transcribed using voice recognition software. Every effort was made to ensure accuracy; however, inadvertent computerized transcription errors may be present.

## 2023-02-20 NOTE — H&P
Hospital Medicine History & Physical      PCP: Kev Marie MD    Date of Admission: 2/19/2023    Date of Service: Pt seen/examined on 02/20/23       Chief Complaint:    Chief Complaint   Patient presents with    Shortness of Breath     Pt presents to ED via [address] [facility] from home with complaints of SOB. EMS states that pt is normally on 8 liters of oxygen via nasal canula at home. States she gave herself breathing treatment prior to there arrival.         History Of Present Illness: The patient is a 77 y.o. female with PMHx as below who presented to [facility] ED with complaint of SOB. Patient's baseline O2 requirement is 4L O2 at rest, and 8L when ambulatory. Recently requiring 8L continuously due to progressive SOB and LEE. Has noted O2 desaturations into 80s on her home pulse ox, despite increased supplemental O2. Denies fever, chills, n/v/d, cough, chest pain, or edema. Past Medical History:        Diagnosis Date    A-fib ([facility] [address]     Anxiety     Cryptogenic organizing pneumonia ([facility] [address]     Depression     GERD (gastroesophageal reflux disease)     Hyperlipidemia     On home oxygen therapy     uses O2 3L prn    Rash     Thyroid disease     hypothyroidism       Past Surgical History:        Procedure Laterality Date    ARM SURGERY Left 3/2/2020    LEFT ULNAR NERVE DECOMPRESSION AT THE ELBOW performed by Veronica Graham MD at [facility] N/A 6/27/2019    EBUS WF W/ANES.  performed by Desire Falcon MD at [address] [place] [address]  6/27/2019    BRONCHOSCOPY/TRANSBRONCHIAL NEEDLE BIOPSY performed by Desire Falcon MD at [address] [place] [address]  6/27/2019    BRONCHOSCOPY/TRANSBRONCHIAL LUNG BIOPSY performed by Desire Falcon MD at [address] [place] [address]  6/27/2019    BRONCHOSCOPY ALVEOLAR LAVAGE performed by Desire Falcon MD at [address] [place] [address]  07/10/2019    BRONCHOSCOPY N/A 7/10/2019    BRONCHOSCOPY ALVEOLAR LAVAGE performed by Yogi Suero MD at [place] [address] Bilateral 08/06/2019    BRONCHOSCOPY N/A 8/6/2019    BRONCHOSCOPY ALVEOLAR LAVAGE performed by Sonja Dc MD at [place] [address] N/A 12/24/2019    BRONCHOSCOPY ALVEOLAR LAVAGE performed by Francesca Wylie MD at [facility] N/A 8/25/2022    COLONOSCOPY POLYPECTOMY SNARE/COLD BIOPSY performed by Cyrus Cochran MD at [address] [place]  9/15/2022    CT GUIDED CHEST TUBE 9/15/2022 [facility] CT SCAN    CT INSERT CATH PLEURA W IMAGE  11/28/2022    CT INSERT CATH PLEURA W IMAGE 11/28/2022 Nadia Bradford MD [facility] CT SCAN    HYSTERECTOMY, TOTAL ABDOMINAL (CERVIX REMOVED)      partial       Medications Prior to Admission:    Prior to Admission medications    Medication Sig Start Date End Date Taking?  Authorizing Provider   guaiFENesin (MUCINEX) 600 MG extended release tablet Take 600 mg by mouth as needed for Congestion   Yes Historical Provider, MD   hydrOXYzine HCl (ATARAX) 10 MG tablet Take 10 mg by mouth 3 times daily as needed for Itching   Yes Historical Provider, MD   methocarbamol (ROBAXIN) 750 MG tablet TAKE 1 TABLET BY MOUTH THREE TIMES A DAY  Patient not taking: Reported on 2/20/2023 2/10/23   Historical Provider, MD   omeprazole (PRILOSEC) 40 MG delayed release capsule Take 1 capsule by mouth every morning (before breakfast) 2/13/23   Mary Salcedo MD   [employer] ALLERGY RELIEF-D12 5-120 MG per extended release tablet TAKE 1 TABLET BY MOUTH TWICE A DAY 2/6/23   Mary Salcedo MD   ondansetron (ZOFRAN) 4 MG tablet Take 1 tablet by mouth 3 times daily as needed for Nausea or Vomiting 1/30/23   Mary Salcedo MD   levothyroxine (SYNTHROID) 125 MCG tablet Take 1 tablet by mouth Daily TAKE 1 TABLET BY MOUTH EVERY DAY 1/26/23   Mary Salcedo MD   benzonatate (TESSALON) 200 MG capsule TAKE 1 CAPSULE BY MOUTH 3 TIMES A DAY AS NEEDED FOR COUGH 1/26/23   Mary Salcedo MD   nadolol (CORGARD) 20 MG tablet Take 1 tablet by mouth daily 1/18/23   Brianna Webber MD   furosemide (LASIX) 40 MG tablet Take 0.5 tablets by mouth See Admin Instructions Daily prn for edema 1/18/23   Brianna Webber MD   albuterol sulfate HFA (PROVENTIL;VENTOLIN;PROAIR) 108 (90 Base) MCG/ACT inhaler INHALE 2 PUFFS INTO THE LUNGS EVERY 6 HOURS AS NEEDED FOR WHEEZING 1/13/23   Ani Heredia MD   atorvastatin (LIPITOR) 40 MG tablet TAKE 1 TABLET EVERY DAY 12/22/22   Hawa Brady MD   dilTIAZem (CARDIZEM CD) 240 MG extended release capsule Take 1 capsule by mouth daily 12/8/22   Noah Acosta MD   Multiple Vitamins-Minerals (WOMENS ONE DAILY PO) Take 1 tablet by mouth daily    Historical Provider, MD   pantoprazole (PROTONIX) 40 MG tablet TAKE 1 TABLET EVERY MORNING BEFORE BREAKFAST  Patient not taking: Reported on 2/20/2023 10/25/22   Hawa Brady MD   sertraline (ZOLOFT) 100 MG tablet TAKE 2 TABLETS EVERY DAY 10/25/22   Hawa Brady MD   mirtazapine (REMERON) 30 MG tablet TAKE 1 TABLET EVERY NIGHT 10/25/22   Hawa Brady MD   Pirfenidone 267 MG TABS pirfenidone daily for a week and then BID for a week and then back to TID. 10/22/22   Brianna Webber MD   LORazepam (ATIVAN) 0.5 MG tablet Take 0.5 mg by mouth every 6 hours as needed for Anxiety. Historical Provider, MD   traMADol (ULTRAM) 50 MG tablet Take 50 mg by mouth 2 times daily as needed for Pain. Historical Provider, MD   albuterol (PROVENTIL) (2.5 MG/3ML) 0.083% nebulizer solution INHALE THE CONTENTS OF 1 VIAL VIA NEBULIZER EVERY 6 HOURS AS NEEDED FOR WHEEZING 2/7/22   Hawa Brady MD       Allergies:  Penicillins, Cefuroxime, Doxycycline, Imitrex [sumatriptan], Meperidine, Sulfa antibiotics, Keflex [cephalexin], and Tape [adhesive tape]    Social History:  The patient currently lives at home     TOBACCO:   reports that she has never smoked. She has never used smokeless tobacco.  ETOH:   reports no history of alcohol use.       Family History:   Positive as follows:        Problem Relation Age of Onset    Diabetes Mother     High Blood Pressure Mother     Asthma Sister     Other Father        REVIEW OF SYSTEMS:     Constitutional: Negative for fever   HENT: Negative for sore throat   Eyes: Negative for redness   Respiratory: +dyspnea, +cough   Cardiovascular: Negative for chest pain   Gastrointestinal: Negative for vomiting, diarrhea   Genitourinary: Negative for hematuria   Musculoskeletal: Negative for arthralgias   Skin: Negative for rash   Neurological: Negative for syncope   Hematological: Negative for adenopathy   Psychiatric/Behavorial: Negative for anxiety    PHYSICAL EXAM:    BP (!) 143/88   Pulse (!) 121   Temp 98.1 °F (36.7 °C) (Oral)   Resp 20   Ht 5' 2\" (1.575 m)   Wt 110 lb (49.9 kg)   SpO2 94%   BMI 20.12 kg/m²   Gen: No distress. Alert. Chronically ill appearing elderly female. Eyes: PERRL. No sclera icterus. No conjunctival injection. Neck: No JVD. No Carotid Bruit. Trachea midline. Resp: No crackles. No wheezes. No rhonchi. Increased work of breathing and overall reduced breath sounds. +Heart monitor to L upper chest wall  CV: Tachycardic rate. Regular rhythm. No murmur. No rub. No edema. Peripheral Pulses: +2 palpable, equal bilaterally   GI: Non-tender. Non-distended. No masses. No organomegaly. Normal bowel sounds. No hernia. Skin: Warm and dry. No nodule on exposed extremities. No rash on exposed extremities. M/S: No cyanosis. No joint deformity. No clubbing. Neuro: Awake. Grossly nonfocal    Psych: Oriented x 3. No anxiety or agitation.      CBC:   Recent Labs     02/19/23 2122 02/20/23  0645   WBC 8.5 7.6   HGB 10.0* 10.3*   HCT 31.1* 32.3*   MCV 79.7* 79.7*    200     BMP:   Recent Labs     02/19/23 2122 02/20/23  0645    140   K 3.4* 5.1    101   CO2 31 29   BUN 10 9   CREATININE <0.5* <0.5*     LIVER PROFILE:   Recent Labs     02/19/23 2122 02/20/23  0645   AST 27 32   ALT 20 20 BILITOT 0.3 0.4   ALKPHOS 118 128            CARDIAC ENZYMES  Recent Labs     02/19/23 2122   TROPONINI <0.01       U/A:    Lab Results   Component Value Date/Time    COLORU Yellow 01/17/2023 02:35 AM    WBCUA None seen 10/19/2022 08:30 PM    RBCUA None seen 10/19/2022 08:30 PM    MUCUS Rare 10/19/2022 08:30 PM    BACTERIA Rare 05/28/2022 06:39 AM    CLARITYU Clear 01/17/2023 02:35 AM    SPECGRAV <=1.005 01/17/2023 02:35 AM    LEUKOCYTESUR Negative 01/17/2023 02:35 AM    BLOODU Negative 01/17/2023 02:35 AM    GLUCOSEU Negative 01/17/2023 02:35 AM    AMORPHOUS Rare 05/28/2022 06:39 AM       ABG    Lab Results   Component Value Date/Time    JZV9QYT 20.4 06/26/2019 05:13 PM    BEART -2.9 06/26/2019 05:13 PM    N2DEAPPQ 90.6 06/26/2019 05:13 PM    PHART 7.440 06/26/2019 05:13 PM    HBE6WSE 30.8 06/26/2019 05:13 PM    PO2ART 55.9 06/26/2019 05:13 PM    ZXL5NXS 21.4 06/26/2019 05:13 PM       CULTURES  Covid/influenza not detected   Respiratory cultures pending     EKG:    Sinus tachycardia with Premature atrial complexesRight ventricular hypertrophyVoltage criteria for left ventricular hypertrophyPossible Lateral infarct (cited on or before 17-JAN-2023)Inferior infarct (cited on or before 17-JAN-2023)Nonspecific ST abnormalityWhen compared with ECG of 17-JAN-2023 00:36,No significant change was foundConfirmed by FAHAD Montemayor MD ([id number]) on 2/20/2023 7:35:40 AM     RADIOLOGY  XR CHEST PORTABLE   Final Result   Chronic findings in the chest without acute airspace disease identified. ECHO 5/2022:  Technically difficult examination. Apical views are off axis secondary to pt   left ribs fracture. Left ventricular systolic function is normal with ejection fraction   estimated at 55%. No regional wall motion abnormalities. There is mild concentric left ventricular hypertrophy. Indeterminate left ventricular filling pressure.    Systolic pulmonary artery pressure (SPAP) is normal estimated at 22 mmHg   (Right atrial pressure of 8 mmHg). ASSESSMENT/PLAN:  #Acute on chronic respiratory failure with hypoxia  #Hx cryptogenic organizing pneumonia  #COPD acute exacerbation  -baseline 8L w/ ambulation and 4L at rest  -now requiring 8L continuously w/ desats on ambulation  -procal negative  -CXR non-acute  -IV solumedrol  -sukumar duonebs and prn albuterol  -azithromycin  -respiratory culture pending   -pulmonology consulted     #Sinus tachycardia  -likely 2/2 above  -d-dimer negative  -monitor on tele    #Metabolic acidosis  -lactic acid WNLs  -repeat BMP pending  -repeat VBG pending     #HypoMg  #HypoK  -replaced in ED    #PAF  -on diltiazem and nadolol  -not on AC, per daughter due to recurrent epistaxis   -currently has heart monitor for work-up for watchman implant    #Diastolic CHF  -without apparent exacerbation  -last echo as above   -monitor I&Os and daily weights    #Chronic anemia  -stable; monitor CBC    #HTN  -stable; continue nadolol     #Type II DM  -reports no longer requiring meds 2/2 weight loss  -monitor POC glucose, SSI prn    #Hypothyroidism  -continue synthroid    #HLD  -continue statin    #Anxiety  -continue daily sertraline and prn ativan        DVT Prophylaxis: Lovenox  Diet: ADULT DIET;  Regular  Code Status: Full Code    Meng Loya PA-C  2/20/2023  9:31 PM

## 2023-02-20 NOTE — PROGRESS NOTES
Patient arrived per stretcher from [facility] at this time. Patient on 8L baseline. Patient refuses to put gown on. Patient asked a couple of questions and states \"You can ask my daughter when she gets here\". RT called and made aware patient was here, respers 28 on admission. VSS. Continuous pulse ox in place.

## 2023-02-20 NOTE — ED NOTES
[facility] called with bed assignment and [facility] ETA of 1600 for transport.       Karoline Grayson RN  02/20/23 [id number]

## 2023-02-20 NOTE — PLAN OF CARE
68yoF presented to [facility] w/ progressive SOB over last week. She is on 4L at rest and 8L w/ ambulation. She is requiring 8L at rest and still rapidly desats even with standing. Pulm c/s. A on C resp fail  aeCOPD  Metabolic acidosis of unclear etiology.   ED to chk lactic  Hypo Mg  Hypo K

## 2023-02-20 NOTE — ED NOTES
Patient placed on continuous cardiac monitor, automatic blood pressure cuff and continuous pulse oximeter.   Pt asleep. Not in distress. Still on high flow Nc @ 8L. Will continue to monitor.      Harry Kinney RN  02/20/23 [id number]

## 2023-02-20 NOTE — ED NOTES
Pt oxygen dropped to 72% on 5 liters on high flow nasal canula. Oxygen increased to 8 liters at this time. Current saturation of 93%.      Kati London RN  02/19/23 [id number]

## 2023-02-20 NOTE — ED NOTES
Pt given magnesium at this time along with warm blanket upon request.     Reena Mckeon RN  02/19/23 [id number]

## 2023-02-20 NOTE — ED PROVIDER NOTES
[address] ENCOUNTER        Patient Name: Susi Brantley  MRN: [id number]  [place] 1955  Date of evaluation: 2/19/2023  Provider: Sherron Ridley MD  PCP: Corinna Meigs, MD  Note Started: 10:08 PM EST 2/19/23      CHIEF COMPLAINT  Shortness of breath       HISTORY & PHYSICAL     HISTORY OF PRESENT ILLNESS  History from : Patient    Limitations to history : None    Susi Brantley is a 77 y.o. female  has a past medical history of A-fib ([address] Anxiety, Cryptogenic organizing pneumonia ([address] Depression, GERD (gastroesophageal reflux disease), Hyperlipidemia, On home oxygen therapy, Rash, and Thyroid disease., who presents to the ED complaining of shortness of breath. Patient uses 8 L of oxygen via nasal cannula at home with ambulation, 4 L at baseline. Patient reports progressive shortness of breath over the past few days. She reports cough productive of yellow sputum. No fever. She denies chest pain. EMS did note that patient uses very long oxygen tubing at home, when they arrived she was 83%. Denies history of blood clots or active malignancy. Patient denies unilateral leg swelling, hemoptysis, recent travel or surgery/immobilization, or OCP or other hormone use. Patient is not post partum. Family history:   Family History   Problem Relation Age of Onset    Diabetes Mother     High Blood Pressure Mother     Asthma Sister     Other Father      Patient does not smoke. Patient denies cocaine or other drug use. Old records reviewed:   Echo 5/6/22  Summary   Technically difficult examination. Apical views are off axis secondary to pt left ribs fracture. Left ventricular systolic function is normal with ejection fraction estimated at 55%. No regional wall motion abnormalities. There is mild concentric left ventricular hypertrophy. Indeterminate left ventricular filling pressure.    Systolic pulmonary artery pressure (SPAP) is normal estimated at 22 mmHg (Right atrial pressure of 8 mmHg). No other complaints, modifying factors or associated symptoms. Nursing Notes were all reviewed and agreed with or any disagreements were addressed in the HPI. I have reviewed the following from the nursing documentation. Past Medical History:   Diagnosis Date    A-fib ([facility] [address]     Anxiety     Cryptogenic organizing pneumonia ([facility] [address]     Depression     GERD (gastroesophageal reflux disease)     Hyperlipidemia     On home oxygen therapy     uses O2 3L prn    Rash     Thyroid disease     hypothyroidism     Past Surgical History:   Procedure Laterality Date    ARM SURGERY Left 3/2/2020    LEFT ULNAR NERVE DECOMPRESSION AT THE ELBOW performed by Deidre Kingsley MD at [facility] N/A 6/27/2019    EBUS WF W/ANES.  performed by Suhas Simon MD at [facility]  6/27/2019    BRONCHOSCOPY/TRANSBRONCHIAL NEEDLE BIOPSY performed by Suhas Simon MD at [facility]  6/27/2019    BRONCHOSCOPY/TRANSBRONCHIAL LUNG BIOPSY performed by Suhas Simon MD at [facility]  6/27/2019    BRONCHOSCOPY ALVEOLAR LAVAGE performed by Suhas Simon MD at [facility]  07/10/2019    BRONCHOSCOPY N/A 7/10/2019    BRONCHOSCOPY ALVEOLAR LAVAGE performed by Melanie Harmon MD at [facility] Bilateral 08/06/2019    BRONCHOSCOPY N/A 8/6/2019    BRONCHOSCOPY ALVEOLAR LAVAGE performed by Blanquita Brink MD at [facility] N/A 12/24/2019    BRONCHOSCOPY ALVEOLAR LAVAGE performed by Yovany Aiken MD at [facility] 8/25/2022    COLONOSCOPY POLYPECTOMY SNARE/COLD BIOPSY performed by Charito Gomez MD at [facility]  9/15/2022    CT GUIDED CHEST TUBE 9/15/2022 [facility] SCAN    CT INSERT CATH PLEURA W IMAGE  11/28/2022    CT INSERT CATH PLEURA W IMAGE 11/28/2022 Yogesh Perdomo MD [facility] CT SCAN    HYSTERECTOMY, TOTAL ABDOMINAL (CERVIX REMOVED)      partial     Family History   Problem Relation Age of Onset    Diabetes Mother     High Blood Pressure Mother     Asthma Sister     Other Father      Social History     Socioeconomic History    Marital status:      Spouse name: Not on file    Number of children: 7    Years of education: Not on file    Highest education level: Not on file   Occupational History    Not on file   Tobacco Use    Smoking status: Never    Smokeless tobacco: Never   Vaping Use    Vaping Use: Never used   Substance and Sexual Activity    Alcohol use: No    Drug use: No    Sexual activity: Not Currently   Other Topics Concern    Not on file   Social History Narrative    Not on file     Social Determinants of Health     Financial Resource Strain: Not on file   Food Insecurity: Not on file   Transportation Needs: No Transportation Needs    Lack of Transportation (Medical): No    Lack of Transportation (Non-Medical): No   Physical Activity: Not on file   Stress: Not on file   Social Connections: Not on file   Intimate Partner Violence: Not on file   Housing Stability: Not on file     Current Facility-Administered Medications   Medication Dose Route Frequency Provider Last Rate Last Admin    ipratropium-albuterol (DUONEB) nebulizer solution 1 ampule  1 ampule Inhalation Q4H WA Bettye Bourgeois MD        azithromycin (ZITHROMAX) 500 mg in 250 mL addavial  500 mg IntraVENous Q24H Kulwant Sevilla MD   Stopped at 02/20/23 0416     Current Outpatient Medications   Medication Sig Dispense Refill    methocarbamol (ROBAXIN) 750 MG tablet TAKE 1 TABLET BY MOUTH THREE TIMES A DAY      omeprazole (PRILOSEC) 40 MG delayed release capsule Take 1 capsule by mouth every morning (before breakfast) 90 capsule 1    [employer] ALLERGY RELIEF-D12 5-120 MG per extended release tablet TAKE 1 TABLET BY MOUTH TWICE A DAY 60 tablet 5    ondansetron (ZOFRAN) 4 MG tablet Take 1 tablet by mouth  3 times daily as needed for Nausea or Vomiting 30 tablet 2    levothyroxine (SYNTHROID) 125 MCG tablet Take 1 tablet by mouth Daily TAKE 1 TABLET BY MOUTH EVERY DAY 90 tablet 1    benzonatate (TESSALON) 200 MG capsule TAKE 1 CAPSULE BY MOUTH 3 TIMES A DAY AS NEEDED FOR COUGH 90 capsule 5    nadolol (CORGARD) 20 MG tablet Take 1 tablet by mouth daily 30 tablet 3    furosemide (LASIX) 40 MG tablet Take 0.5 tablets by mouth See Admin Instructions Daily prn for edema 60 tablet 3    albuterol sulfate HFA (PROVENTIL;VENTOLIN;PROAIR) 108 (90 Base) MCG/ACT inhaler INHALE 2 PUFFS INTO THE LUNGS EVERY 6 HOURS AS NEEDED FOR WHEEZING 3 each 1    atorvastatin (LIPITOR) 40 MG tablet TAKE 1 TABLET EVERY DAY 90 tablet 1    dilTIAZem (CARDIZEM CD) 240 MG extended release capsule Take 1 capsule by mouth daily 30 capsule 2    Multiple Vitamins-Minerals (WOMENS ONE DAILY PO) Take 1 tablet by mouth daily      pantoprazole (PROTONIX) 40 MG tablet TAKE 1 TABLET EVERY MORNING BEFORE BREAKFAST 90 tablet 1    sertraline (ZOLOFT) 100 MG tablet TAKE 2 TABLETS EVERY  tablet 1    mirtazapine (REMERON) 30 MG tablet TAKE 1 TABLET EVERY NIGHT 90 tablet 1    Pirfenidone 267 MG TABS pirfenidone daily for a week and then BID for a week and then back to TID. 270 tablet 3    LORazepam (ATIVAN) 0.5 MG tablet Take 0.5 mg by mouth every 6 hours as needed for Anxiety. traMADol (ULTRAM) 50 MG tablet Take 50 mg by mouth 2 times daily as needed for Pain. albuterol (PROVENTIL) (2.5 MG/3ML) 0.083% nebulizer solution INHALE THE CONTENTS OF 1 VIAL VIA NEBULIZER EVERY 6 HOURS AS NEEDED FOR WHEEZING 720 mL 1     Allergies   Allergen Reactions    Penicillins Anaphylaxis     Tolerates Meropenem. Cefuroxime      Tolerates Meropenem. Doxycycline Hives    Imitrex [Sumatriptan] Other (See Comments)     Feels like pins and needles    Meperidine     Sulfa Antibiotics Hives    Keflex [Cephalexin] Itching and Rash     Tolerates Meropenem.     Tape Tasha Major Tape] Rash       REVIEW OF SYSTEMS  All systems reviewed, pertinent positives per HPI otherwise noted to be negative. PHYSICAL EXAM  ED Triage Vitals [02/19/23 2130]   BP Temp Temp Source Heart Rate Resp SpO2 Height Weight   112/73 98.3 °F (36.8 °C) Oral (!) 103 20 92 % 5' 2\" (1.575 m) 110 lb (49.9 kg)     GENERAL APPEARANCE: Awake and alert. Cooperative. no distress. Chronically ill-appearing  HENT: Normocephalic. Atraumatic. Mucous membranes are moist  NECK: Supple. Full range of motion of the neck without stiffness or pain. EYES: PERRL. EOM's grossly intact. HEART/CHEST: RR, tachycardia. No murmurs. Chest wall is not tender to palpation. LUNGS: Respirations unlabored. CTAB. Good air exchange. Speaking comfortably in full sentences. ABDOMEN: No tenderness. Soft. Non-distended. No masses. No organomegaly. No guarding or rebound. MUSCULOSKELETAL: No extremity edema, lower extremities are equal bilaterally and nontender to palpation. Compartments soft. No deformity. No tenderness in the extremities. All extremities neurovascularly intact. SKIN: Warm and dry. No acute rashes. NEUROLOGICAL: Alert and oriented. No gross facial drooping. Strength 5/5, sensation intact. PSYCHIATRIC: Normal mood and affect. WORKUP     LABS  I have reviewed all labs for this visit.    Results for orders placed or performed during the hospital encounter of 02/19/23   COVID-19, Rapid    Specimen: Nasopharyngeal Swab   Result Value Ref Range    SARS-CoV-2, NAAT Not Detected Not Detected   Rapid influenza A/B antigens    Specimen: Nasopharyngeal   Result Value Ref Range    Rapid Influenza A Ag Negative Negative    Rapid Influenza B Ag Negative Negative   CBC with Auto Differential   Result Value Ref Range    WBC 8.5 4.0 - 11.0 K/uL    RBC 3.91 (L) 4.00 - 5.20 M/uL    Hemoglobin 10.0 (L) 12.0 - 16.0 g/dL    Hematocrit 31.1 (L) 36.0 - 48.0 %    MCV 79.7 (L) 80.0 - 100.0 fL    MCH 25.5 (L) 26.0 - 34.0 pg    MCHC 32.0 31.0 - 36.0 g/dL    RDW 15.9 (H) 12.4 - 15.4 %    Platelets 044 789 - 169 K/uL    MPV 7.9 5.0 - 10.5 fL    Neutrophils % 72.5 %    Lymphocytes % 18.2 %    Monocytes % 5.1 %    Eosinophils % 3.5 %    Basophils % 0.7 %    Neutrophils Absolute 6.1 1.7 - 7.7 K/uL    Lymphocytes Absolute 1.5 1.0 - 5.1 K/uL    Monocytes Absolute 0.4 0.0 - 1.3 K/uL    Eosinophils Absolute 0.3 0.0 - 0.6 K/uL    Basophils Absolute 0.1 0.0 - 0.2 K/uL   CMP w/ Reflex to MG   Result Value Ref Range    Sodium 144 136 - 145 mmol/L    Potassium reflex Magnesium 3.4 (L) 3.5 - 5.1 mmol/L    Chloride 104 99 - 110 mmol/L    CO2 31 21 - 32 mmol/L    Anion Gap 9 3 - 16    Glucose 104 (H) 70 - 99 mg/dL    BUN 10 7 - 20 mg/dL    Creatinine <0.5 (L) 0.6 - 1.2 mg/dL    Est, Glom Filt Rate >60 >60    Calcium 9.2 8.3 - 10.6 mg/dL    Total Protein 7.2 6.4 - 8.2 g/dL    Albumin 3.8 3.4 - 5.0 g/dL    Albumin/Globulin Ratio 1.1 1.1 - 2.2    Total Bilirubin 0.3 0.0 - 1.0 mg/dL    Alkaline Phosphatase 118 40 - 129 U/L    ALT 20 10 - 40 U/L    AST 27 15 - 37 U/L   Troponin   Result Value Ref Range    Troponin <0.01 <0.01 ng/mL   Brain Natriuretic Peptide   Result Value Ref Range    Pro- (H) 0 - 124 pg/mL   Blood gas, venous   Result Value Ref Range    pH, [place] 7.331 (L) 7.350 - 7.450    pCO2, [place] 35.3 (L) 40.0 - 50.0 mmHg    pO2, [place] 63.0 (H) 25.0 - 40.0 mmHg    HCO3, Venous 18.2 (L) 23.0 - 29.0 mmol/L    Base Excess, [place] -7.0 (L) -3.0 - 3.0 mmol/L    O2 Sat, [place] 91 Not Established %    Carboxyhemoglobin 0.8 0.0 - 1.5 %    MetHgb, [place] 0.0 <1.5 %    TC02 (Calc), [place] 19 Not Established mmol/L    O2 Therapy Unknown    D-Dimer, Quantitative   Result Value Ref Range    D-Dimer, Quant <0.27 0.00 - 0.60 ug/mL FEU   Magnesium   Result Value Ref Range    Magnesium 1.70 (L) 1.80 - 2.40 mg/dL   Lactic Acid   Result Value Ref Range    Lactic Acid 1.7 0.4 - 2.0 mmol/L         ECG  The EKG, as interpreted by myself, in the emergency department in the absence of a cardiologist.  sinus tachycardia, axyr=505   Axis is   Normal  QTc is  479  Intervals and Durations are unremarkable. ST Segments: no acute change  No significant change from prior EKG dated 1/17/23      RADIOLOGY  Non-plain film images such as CT, Ultrasound and MRI are read by the radiologist. Jenny Reese radiographic images are visualized and preliminarily interpreted by the ED Provider with the below findings:    Chest x-ray shows no significant change from previous, no pneumothorax or pleural effusion    Interpretation per the Radiologist below, if available at the time of this note:    XR CHEST PORTABLE   Final Result   Chronic findings in the chest without acute airspace disease identified. EMERGENCY DEPARTMENT COURSE and DIFFERENTIAL DIAGNOSIS/MDM:   Patient seen and evaluated. Old records reviewed and pertinent information included in HPI. Labs and imaging reviewed and results discussed with patient. Overall chronically ill appearing patient, in no acute distress, presenting for shortness of breath. History obtained from patient and son. Physical exam remarkable for chronic ill appearance    Patient's pertinent external medical records: Records Reviewed : Inpatient Notes patient has had previous admissions for pneumothorax    Chronic Medical Conditions that may contribute to presentation today:  has a past medical history of A-fib ([address] Anxiety, Cryptogenic organizing pneumonia ([address] Depression, GERD (gastroesophageal reflux disease), Hyperlipidemia, On home oxygen therapy, Rash, and Thyroid disease.      Social Determinants affecting Dx or Tx:    Social History     Socioeconomic History    Marital status:      Spouse name: Not on file    Number of children: 7    Years of education: Not on file    Highest education level: Not on file   Occupational History    Not on file   Tobacco Use    Smoking status: Never    Smokeless tobacco: Never   Vaping Use    Vaping Use: Never used Substance and Sexual Activity    Alcohol use: No    Drug use: No    Sexual activity: Not Currently   Other Topics Concern    Not on file   Social History Narrative    Not on file     Social Determinants of Health     Financial Resource Strain: Not on file   Food Insecurity: Not on file   Transportation Needs: No Transportation Needs    Lack of Transportation (Medical): No    Lack of Transportation (Non-Medical): No   Physical Activity: Not on file   Stress: Not on file   Social Connections: Not on file   Intimate Partner Violence: Not on file   Housing Stability: Not on file         Differential diagnosis includes but is not limited to: COVID, flu, Pneumonia, pneumothorax, pleural effusion, ACS, CHF exacerbation, COPD exacerbation, asthma, pulmonary embolism, arrhythmia, anemia    EKG, laboratory studies, and imaging obtained. WORKUP INTERPRETATION:  ED Course as of 02/20/23 0418   Sun Feb 19, 2023 2136 Blood gas shows acidemia 7.3, no hypercarbia. [ER]   2137 Stable anemia from previous, no leukocytosis or [ER]   2150 The Ekg interpreted by me shows  sinus tachycardia, rgez=720   Axis is   Normal  QTc is  479  Intervals and Durations are unremarkable. ST Segments: no acute change  No significant change from prior EKG dated 1/17/23 [ER]   2151 EKG with no significant change from previous. Troponin within normal limits. Patient denies chest pain. [ER]   2151 Mild hypokalemia 3.4. No other electrolyte abnormalities or evidence of kidney dysfunction. [ER]   2159 BNP within age adjusted range, no evidence of fluid overload on exam [ER]   2210 Chest x-ray shows no significant change from previous, no pneumothorax or pleural effusion [ER]   2212 Flu swab negative [ER]   2225 COVID swab negative [ER]   2303 D dimer within normal limits. At this time I have low concern for pulmonary embolism. Patient has not had a previous blood clot. Patient denies other risk factors for pulmonary embolism.   Patient does not have any evidence of DVT on exam.  Patient is low risk on Wells criteria. At this time, considering that risks associated with further work-up for pulmonary embolism outweigh the likelihood of this diagnosis. [ER]   Mon Feb 20, 2023   0030 When patient was transferring from bed to bedside commode, despite being on 8 L high flow nasal cannula, patient desaturated to 65% [ER]   0139 Lactic acid within normal limits [ER]      ED Course User Index  [ER] Ata Rm MD        CONSULTS:   IP CONSULT TO HOSPITALIST-discussed patient presentation and work-up with hospitalist Dr. Mary Murillo who accepted the patient for admission        SCREENINGS:       [place] Coma Scale  Eye Opening: Spontaneous  Best Verbal Response: Oriented  Best Motor Response: Obeys commands  [place] Coma Scale Score: 15                CIWA Assessment  BP: 126/88  Heart Rate: 93           Is this patient to be included in the SEP-1 Core Measure due to severe sepsis or septic shock? No   Exclusion criteria - the patient is NOT to be included for SEP-1 Core Measure due to:  2+ SIRS criteria are not met      TREATMENT:  During the patient's ED course, the patient was given:  Medications   ipratropium-albuterol (DUONEB) nebulizer solution 1 ampule (has no administration in time range)   azithromycin (ZITHROMAX) 500 mg in 250 mL addavial (500 mg IntraVENous New Bag 2/20/23 0253)   magnesium sulfate 1000 mg in dextrose 5% 100 mL IVPB (0 mg IntraVENous Stopped 2/20/23 0018)   potassium chloride (KLOR-CON M) extended release tablet 40 mEq (40 mEq Oral Given 2/19/23 2322)   magnesium sulfate 1000 mg in dextrose 5% 100 mL IVPB (0 mg IntraVENous Stopped 2/20/23 0254)   methylPREDNISolone sodium (SOLU-MEDROL) injection 125 mg (125 mg IntraVENous Given 2/20/23 0142)        REASSESSMENT:  On reassessment, patient did report that she felt improved. Labs and imaging were overall reassuring.   However, when patient was transitioning just to bedside commode, oxygen desaturated to 65%. At this time, do not feel that patient is appropriate for home-going. At this time, do feel the patient requires admission for further work-up and management. Patient agrees to admission. Discussed the patient with hospital team, patient to be admitted to [facility]. Vitals:    Vitals:    02/19/23 2230 02/19/23 2255 02/20/23 0154 02/20/23 0416   BP: 134/80  (!) 144/80 126/88   Pulse: (!) 101 96 (!) 108 93   Resp: 20 20 18   Temp:       TempSrc:       SpO2: 100%  95% 100%   Weight:       Height:           CRITICAL CARE TIME     I personally spent a total of 32 minutes of critical care time in obtaining history, performing a physical exam, bedside monitoring of interventions, collecting and interpreting tests and discussion with consultants but excluding time spent performing procedures, treating other patients and teaching time. FINAL IMPRESSION      1. Acute on chronic respiratory failure with hypoxia (HCC)    2. History of COPD    3. Hypomagnesemia    4. Hypokalemia          DISPOSITION/PLAN   Disposition: DISPOSITION Admitted 02/20/2023 01:24:32 AM    Condition: stable     DISCLAIMER: This chart was created using Dragon dictation software. Efforts were made by me to ensure accuracy, however some errors may be present due to limitations of this technology and occasionally words are not transcribed correctly.     MD Kody Walter MD  02/20/23 9846

## 2023-02-21 LAB
ANION GAP SERPL CALCULATED.3IONS-SCNC: 6 MMOL/L (ref 3–16)
BASE EXCESS VENOUS: 2.2 MMOL/L (ref -3–3)
BASOPHILS ABSOLUTE: 0 K/UL (ref 0–0.2)
BASOPHILS RELATIVE PERCENT: 0.1 %
BUN BLDV-MCNC: 10 MG/DL (ref 7–20)
CALCIUM SERPL-MCNC: 7.7 MG/DL (ref 8.3–10.6)
CARBOXYHEMOGLOBIN: 2.8 % (ref 0–1.5)
CHLORIDE BLD-SCNC: 106 MMOL/L (ref 99–110)
CO2: 31 MMOL/L (ref 21–32)
CREAT SERPL-MCNC: <0.5 MG/DL (ref 0.6–1.2)
EKG ATRIAL RATE: 109 BPM
EKG DIAGNOSIS: NORMAL
EKG P AXIS: 21 DEGREES
EKG P-R INTERVAL: 156 MS
EKG Q-T INTERVAL: 328 MS
EKG QRS DURATION: 78 MS
EKG QTC CALCULATION (BAZETT): 441 MS
EKG R AXIS: 16 DEGREES
EKG T AXIS: -12 DEGREES
EKG VENTRICULAR RATE: 109 BPM
EOSINOPHILS ABSOLUTE: 0 K/UL (ref 0–0.6)
EOSINOPHILS RELATIVE PERCENT: 0 %
GFR SERPL CREATININE-BSD FRML MDRD: >60 ML/MIN/{1.73_M2}
GLUCOSE BLD-MCNC: 114 MG/DL (ref 70–99)
GLUCOSE BLD-MCNC: 121 MG/DL (ref 70–99)
GLUCOSE BLD-MCNC: 140 MG/DL (ref 70–99)
GLUCOSE BLD-MCNC: 192 MG/DL (ref 70–99)
GLUCOSE BLD-MCNC: 84 MG/DL (ref 70–99)
HCO3 VENOUS: 26.4 MMOL/L (ref 23–29)
HCT VFR BLD CALC: 27.4 % (ref 36–48)
HEMOGLOBIN: 8.8 G/DL (ref 12–16)
LACTATE DEHYDROGENASE: 172 U/L (ref 100–190)
LYMPHOCYTES ABSOLUTE: 0.8 K/UL (ref 1–5.1)
LYMPHOCYTES RELATIVE PERCENT: 7.2 %
MCH RBC QN AUTO: 26.1 PG (ref 26–34)
MCHC RBC AUTO-ENTMCNC: 32.2 G/DL (ref 31–36)
MCV RBC AUTO: 80.9 FL (ref 80–100)
METHEMOGLOBIN VENOUS: 0.3 %
MONOCYTES ABSOLUTE: 0.8 K/UL (ref 0–1.3)
MONOCYTES RELATIVE PERCENT: 7.7 %
NEUTROPHILS ABSOLUTE: 9.3 K/UL (ref 1.7–7.7)
NEUTROPHILS RELATIVE PERCENT: 85 %
O2 CONTENT, VEN: 14 VOL %
O2 SAT, VEN: 99 %
O2 THERAPY: ABNORMAL
PCO2, VEN: 39.4 MMHG (ref 40–50)
PDW BLD-RTO: 15.9 % (ref 12.4–15.4)
PERFORMED ON: ABNORMAL
PERFORMED ON: NORMAL
PH VENOUS: 7.44 (ref 7.35–7.45)
PLATELET # BLD: 154 K/UL (ref 135–450)
PMV BLD AUTO: 7.7 FL (ref 5–10.5)
PO2, VEN: 134.2 MMHG (ref 25–40)
POTASSIUM REFLEX MAGNESIUM: 4.1 MMOL/L (ref 3.5–5.1)
RBC # BLD: 3.39 M/UL (ref 4–5.2)
SODIUM BLD-SCNC: 143 MMOL/L (ref 136–145)
TCO2 CALC VENOUS: 28 MMOL/L
WBC # BLD: 10.9 K/UL (ref 4–11)

## 2023-02-21 PROCEDURE — 94640 AIRWAY INHALATION TREATMENT: CPT

## 2023-02-21 PROCEDURE — 6360000002 HC RX W HCPCS: Performed by: INTERNAL MEDICINE

## 2023-02-21 PROCEDURE — 2060000000 HC ICU INTERMEDIATE R&B

## 2023-02-21 PROCEDURE — 83036 HEMOGLOBIN GLYCOSYLATED A1C: CPT

## 2023-02-21 PROCEDURE — 6370000000 HC RX 637 (ALT 250 FOR IP): Performed by: INTERNAL MEDICINE

## 2023-02-21 PROCEDURE — 94761 N-INVAS EAR/PLS OXIMETRY MLT: CPT

## 2023-02-21 PROCEDURE — 2700000000 HC OXYGEN THERAPY PER DAY

## 2023-02-21 PROCEDURE — 82803 BLOOD GASES ANY COMBINATION: CPT

## 2023-02-21 PROCEDURE — 2580000003 HC RX 258: Performed by: INTERNAL MEDICINE

## 2023-02-21 PROCEDURE — 83615 LACTATE (LD) (LDH) ENZYME: CPT

## 2023-02-21 PROCEDURE — 99223 1ST HOSP IP/OBS HIGH 75: CPT | Performed by: INTERNAL MEDICINE

## 2023-02-21 PROCEDURE — 93005 ELECTROCARDIOGRAM TRACING: CPT | Performed by: INTERNAL MEDICINE

## 2023-02-21 PROCEDURE — 93010 ELECTROCARDIOGRAM REPORT: CPT | Performed by: INTERNAL MEDICINE

## 2023-02-21 PROCEDURE — 2500000003 HC RX 250 WO HCPCS: Performed by: INTERNAL MEDICINE

## 2023-02-21 PROCEDURE — 85025 COMPLETE CBC W/AUTO DIFF WBC: CPT

## 2023-02-21 PROCEDURE — 80048 BASIC METABOLIC PNL TOTAL CA: CPT

## 2023-02-21 PROCEDURE — 36415 COLL VENOUS BLD VENIPUNCTURE: CPT

## 2023-02-21 RX ORDER — METOPROLOL TARTRATE 5 MG/5ML
5 INJECTION INTRAVENOUS
Status: DISCONTINUED | OUTPATIENT
Start: 2023-02-21 | End: 2023-02-22 | Stop reason: HOSPADM

## 2023-02-21 RX ORDER — PIRFENIDONE 267 MG/1
534 TABLET, FILM COATED ORAL 2 TIMES DAILY
Status: DISCONTINUED | OUTPATIENT
Start: 2023-02-21 | End: 2023-02-22 | Stop reason: HOSPADM

## 2023-02-21 RX ADMIN — METOPROLOL TARTRATE 5 MG: 5 INJECTION, SOLUTION INTRAVENOUS at 00:18

## 2023-02-21 RX ADMIN — IPRATROPIUM BROMIDE AND ALBUTEROL SULFATE 1 AMPULE: 2.5; .5 SOLUTION RESPIRATORY (INHALATION) at 19:52

## 2023-02-21 RX ADMIN — BENZONATATE 100 MG: 100 CAPSULE ORAL at 18:28

## 2023-02-21 RX ADMIN — IPRATROPIUM BROMIDE AND ALBUTEROL SULFATE 1 AMPULE: 2.5; .5 SOLUTION RESPIRATORY (INHALATION) at 15:34

## 2023-02-21 RX ADMIN — Medication 10 ML: at 20:52

## 2023-02-21 RX ADMIN — MIRTAZAPINE 30 MG: 30 TABLET, FILM COATED ORAL at 20:53

## 2023-02-21 RX ADMIN — IPRATROPIUM BROMIDE AND ALBUTEROL SULFATE 1 AMPULE: 2.5; .5 SOLUTION RESPIRATORY (INHALATION) at 08:36

## 2023-02-21 RX ADMIN — PIRFENIDONE 534 MG: 267 TABLET, FILM COATED ORAL at 20:53

## 2023-02-21 RX ADMIN — SERTRALINE 200 MG: 100 TABLET, FILM COATED ORAL at 08:32

## 2023-02-21 RX ADMIN — AZITHROMYCIN DIHYDRATE 500 MG: 500 INJECTION, POWDER, LYOPHILIZED, FOR SOLUTION INTRAVENOUS at 03:29

## 2023-02-21 RX ADMIN — ATORVASTATIN CALCIUM 40 MG: 40 TABLET, FILM COATED ORAL at 08:31

## 2023-02-21 RX ADMIN — NADOLOL 20 MG: 40 TABLET ORAL at 08:32

## 2023-02-21 RX ADMIN — IPRATROPIUM BROMIDE AND ALBUTEROL SULFATE 1 AMPULE: 2.5; .5 SOLUTION RESPIRATORY (INHALATION) at 11:49

## 2023-02-21 RX ADMIN — PANTOPRAZOLE SODIUM 40 MG: 40 TABLET, DELAYED RELEASE ORAL at 05:41

## 2023-02-21 RX ADMIN — LEVOTHYROXINE SODIUM 125 MCG: 125 TABLET ORAL at 05:41

## 2023-02-21 RX ADMIN — DILTIAZEM HYDROCHLORIDE 240 MG: 240 CAPSULE, COATED, EXTENDED RELEASE ORAL at 08:32

## 2023-02-21 RX ADMIN — METHYLPREDNISOLONE SODIUM SUCCINATE 40 MG: 40 INJECTION, POWDER, FOR SOLUTION INTRAMUSCULAR; INTRAVENOUS at 14:26

## 2023-02-21 RX ADMIN — BENZONATATE 100 MG: 100 CAPSULE ORAL at 05:41

## 2023-02-21 RX ADMIN — ENOXAPARIN SODIUM 30 MG: 100 INJECTION SUBCUTANEOUS at 08:32

## 2023-02-21 RX ADMIN — METOPROLOL TARTRATE 5 MG: 5 INJECTION, SOLUTION INTRAVENOUS at 04:42

## 2023-02-21 NOTE — CARE COORDINATION
Case Management Assessment  Initial Evaluation    Date/Time of Evaluation: 2/21/2023 4:08 PM  Assessment Completed by: Maryam Falcon RN    If patient is discharged prior to next notation, then this note serves as note for discharge by case management. Patient Name: Marianela Obando                   YOB: 1955  Diagnosis: Hypokalemia [E87.6]  Hypomagnesemia [E83.42]  History of COPD [Z87.09]  Acute on chronic respiratory failure with hypoxia ([facility] [address] [J96.21]                   Date / Time: 2/19/2023  8:48 PM    Patient Admission Status: Inpatient   Readmission Risk (Low < 19, Mod (19-27), High > 27): Readmission Risk Score: 40.7    Current PCP: Kacy Carr MD  PCP verified by ? Yes    Chart Reviewed: Yes      History Provided by:    Patient Orientation: Alert and Oriented, Person, Place, Situation, Self    Patient Cognition: Alert    Hospitalization in the last 30 days (Readmission):  No    If yes, Readmission Assessment in  Navigator will be completed.     Advance Directives:      Code Status: Full Code   Patient's Primary Decision Maker is: Named in Scanned ACP Document    Primary Decision Maker: Christopher Joseph - Spouse - [phone]    Secondary Decision Maker: Jennifer King - Child - [phone]    Discharge Planning:    Patient lives with: Spouse/Significant Other Type of Home: House  Primary Care Giver: Spouse  Patient Support Systems include: Spouse/Significant Other, Children   Current Financial resources: Medicare, Medicaid  Current community resources: Health Education, ECF/Home Care  Current services prior to admission: Durable Medical Equipment            Current DME: Bedside Commode, Walker, Shower Chair, Oxygen Therapy (Comment), Other (Comment) ([facility] 4-8 lityers (10 liter concentrator))            Type of Home Care services:  OT, PT, [employer], Nursing Services    ADLS  Prior functional level: Assistance with the following:, Bathing, Toileting  Current functional level: Assistance with the following:, Bathing, Toileting    PT AM-PAC:   /24  OT AM-PAC:   /24    Family can provide assistance at DC: Yes  Would you like Case Management to discuss the discharge plan with any other family members/significant others, and if so, who? Yes (daughter, Elana Grossman or Spouse, [place])  Plans to Return to Present Housing: Yes  Other Identified Issues/Barriers to RETURNING to current housing: YES  Potential Assistance needed at discharge: [place]/Passport            Potential DME:    Patient expects to discharge to: [address] [place] [address] for transportation at discharge:      Financial    Payor: [address] [place] [address] / Plan: Monico Gitelman / Product Type: *No Product type* /     Does insurance require precert for SNF: Yes    Potential assistance Purchasing Medications: No  Meds-to-Beds request:        [facility]/pharmacy #[id number]- [place], [place] - [address] [facility] [phone] - F [phone]  [address] [address]  Phone: [phone] Fax: [phone]    [id number] [phone] Fifi Earnest [phone]  [address] [facility] [address]  Phone: [phone] Fax: [phone]    [facility] [place] [address]  Phone: [phone] Fax: [phone]    [facility] [phone] - [phone]  [address] [address]  Phone: [phone] Fax: [phone]      Notes:    Factors facilitating achievement of predicted outcomes: Family support    Barriers to discharge: Medical complications    Additional Case Management Notes: Chart reviewed. Met with pt at bedside and explained the role of the CM. Has Passport HHA (dtr) every MW evening for about 3-4 hours to assist with bedtime routine.  [facility] provides the HHA through PASSPORT. Per daughter Evan España has approver her for a total of 25 hours and she would like to have Tues/Thurs covered but [facility] has stated that they do not have staff to provide aid on Tues and Thurs. CM offered private pay HHA provider list  however, pt cannot afford private duty fees. Passport CM is [facility] [phone]. +Active with [facility]. (SN/PT/OT) and plans to resume skilled services at home. +Cm following      The Plan for Transition of Care is related to the following treatment goals of Hypokalemia [E87.6]  Hypomagnesemia [E83.42]  History of COPD [Z87.09]  Acute on chronic respiratory failure with hypoxia ([facility] [address] [J64.29]    IF APPLICABLE: The Patient and/or patient representative Karoline Barrios and her family were provided with a choice of provider and agrees with the discharge plan. Freedom of choice list with basic dialogue that supports the patient's individualized plan of care/goals and shares the quality data associated with the providers was provided to:     Patient Representative Name:       The Patient and/or Patient Representative Agree with the Discharge Plan?       Susan Marie RN  Case Management Department  Ph: [phone]

## 2023-02-21 NOTE — PROGRESS NOTES
This RN notified by monitor tech of elevated Hrs 130-160s. PATs with at times of possible a flutter. Hospitalist notified. Awaiting orders.

## 2023-02-21 NOTE — FLOWSHEET NOTE
02/21/23 0440   Vital Signs   Heart Rate (!) 126   BP (!) 144/101   MAP (Calculated) 115     Patients HR and bp elevated. Prn metoprolol given per mar.

## 2023-02-21 NOTE — PROGRESS NOTES
RT Inhaler-Nebulizer Bronchodilator Protocol Note    There is a bronchodilator order in the chart from a provider indicating to follow the RT Bronchodilator Protocol and there is an Initiate RT Inhaler-Nebulizer Bronchodilator Protocol order as well (see protocol at bottom of note). CXR Findings:  XR CHEST PORTABLE    Result Date: 2/19/2023  Chronic findings in the chest without acute airspace disease identified. The findings from the last RT Protocol Assessment were as follows:   History Pulmonary Disease: Chronic pulmonary disease  Respiratory Pattern: Regular pattern and RR 12-20 bpm  Breath Sounds: Slightly diminished and/or crackles  Cough: Weak, productive  Indication for Bronchodilator Therapy: Decreased or absent breath sounds  Bronchodilator Assessment Score: 6    Aerosolized bronchodilator medication orders have been revised according to the RT Inhaler-Nebulizer Bronchodilator Protocol below. Respiratory Therapist to perform RT Therapy Protocol Assessment initially then follow the protocol. Repeat RT Therapy Protocol Assessment PRN for score 0-3 or on second treatment, BID, and PRN for scores above 3. No Indications - adjust the frequency to every 6 hours PRN wheezing or bronchospasm, if no treatments needed after 48 hours then discontinue using Per Protocol order mode. If indication present, adjust the RT bronchodilator orders based on the Bronchodilator Assessment Score as indicated below. Use Inhaler orders unless patient has one or more of the following: on home nebulizer, not able to hold breath for 10 seconds, is not alert and oriented, cannot activate and use MDI correctly, or respiratory rate 25 breaths per minute or more, then use the equivalent nebulizer order(s) with same Frequency and PRN reasons based on the score. If a patient is on this medication at home then do not decrease Frequency below that used at home.     0-3 - enter or revise RT bronchodilator order(s) to equivalent RT Bronchodilator order with Frequency of every 4 hours PRN for wheezing or increased work of breathing using Per Protocol order mode.        4-6 - enter or revise RT Bronchodilator order(s) to two equivalent RT bronchodilator orders with one order with BID Frequency and one order with Frequency of every 4 hours PRN wheezing or increased work of breathing using Per Protocol order mode.        7-10 - enter or revise RT Bronchodilator order(s) to two equivalent RT bronchodilator orders with one order with TID Frequency and one order with Frequency of every 4 hours PRN wheezing or increased work of breathing using Per Protocol order mode.       11-13 - enter or revise RT Bronchodilator order(s) to one equivalent RT bronchodilator order with QID Frequency and an Albuterol order with Frequency of every 4 hours PRN wheezing or increased work of breathing using Per Protocol order mode.      Greater than 13 - enter or revise RT Bronchodilator order(s) to one equivalent RT bronchodilator order with every 4 hours Frequency and an Albuterol order with Frequency of every 2 hours PRN wheezing or increased work of breathing using Per Protocol order mode.       Electronically signed by Jose Juan Lucia RCP on 2/20/2023 at 9:59 PM

## 2023-02-21 NOTE — PROGRESS NOTES
Patient recovered well. Now on 10L o2 and 98%. Decreasing to 9L as tolerated. Right iv not flushing. Will remove and place new iv.

## 2023-02-21 NOTE — FLOWSHEET NOTE
02/20/23 1941   Vital Signs   Temp 98.1 °F (36.7 °C)   Temp Source Oral   Heart Rate (!) 121   Heart Rate Source Monitor   Resp 20   BP (!) 143/88   MAP (Calculated) 106   BP Location Left upper arm   BP Method Automatic   Patient Position High fowlers   Level of Consciousness 0   MEWS Score 3   Oxygen Therapy   SpO2 94 %   O2 Device Nasal cannula   O2 Flow Rate (L/min) 8 L/min     Shift assessment completed see flow sheet. Patient in bed alert and oriented X4. Patient on 8L O2, showing no signs of distress. Evening medications given per order. No other needs at this time. Call light in reach.

## 2023-02-21 NOTE — CONSULTS
[facility]                                 Pulmonary Consult /Progress Note :                                                                  CHIEF COMPLAINT: SOB  Reason ILD with acute exacerbation. Hypoxia acute on chronic. Consultant physician :Celestine Hsieh MD    HPI:   Patient is 75-year-old female with extensive work-up for interstitial lung disease that include bronchoscopy with EBUS as well as cryobiopsy and reviewing her records showing most likely organizing pneumonia along with possibility of nonspecific interstitial pneumonia        The patient denies any history of previously smoking    She uses 3 L of home oxygen and she noticed that her oxygen requirement has been increasing over the last few days along with shortness of breath and dyspnea on exertion    During the admission ,She was a placed on steroids 40 mg twice daily  Denies any sputum, most of her cough dry, denies any leg swelling, denies any history of travel or COVID exposure and more short winded    Denies any fever chills chest pain no hemoptysis          Past Medical History:   Diagnosis Date    A-fib ([facility] [address]     Anxiety     Cryptogenic organizing pneumonia ([facility] [address]     Depression     GERD (gastroesophageal reflux disease)     Hyperlipidemia     On home oxygen therapy     uses O2 3L prn    Rash     Thyroid disease     hypothyroidism       Past Surgical History:        Procedure Laterality Date    ARM SURGERY Left 3/2/2020    LEFT ULNAR NERVE DECOMPRESSION AT THE ELBOW performed by Abhisehk Gay MD at [address] [place] [address] 6/27/2019    EBUS WF W/ANES.  performed by Junaid Ulloa MD at [facility]  6/27/2019    BRONCHOSCOPY/TRANSBRONCHIAL NEEDLE BIOPSY performed by Junaid Ulloa MD at [facility]  6/27/2019    BRONCHOSCOPY/TRANSBRONCHIAL LUNG BIOPSY performed by Junaid Ulloa MD at [address] [place] [address] BRONCHOSCOPY  6/27/2019    BRONCHOSCOPY ALVEOLAR LAVAGE performed by Anthony Galindo MD at [address]  07/10/2019    BRONCHOSCOPY N/A 7/10/2019    BRONCHOSCOPY ALVEOLAR LAVAGE performed by Ariella Abraham MD at [address] Bilateral 08/06/2019    BRONCHOSCOPY N/A 8/6/2019    BRONCHOSCOPY ALVEOLAR LAVAGE performed by Nancie Laguerre MD at [address] N/A 12/24/2019    BRONCHOSCOPY ALVEOLAR LAVAGE performed by Azalea Shepard MD at [address] [facility] N/A 8/25/2022    COLONOSCOPY POLYPECTOMY SNARE/COLD BIOPSY performed by Leon Minor MD at [address]  9/15/2022    CT GUIDED CHEST TUBE 9/15/2022 [facility] CT SCAN    CT INSERT CATH PLEURA W IMAGE  11/28/2022    CT INSERT CATH PLEURA W IMAGE 11/28/2022 Jennie Garcia MD [facility] CT SCAN    HYSTERECTOMY, TOTAL ABDOMINAL (CERVIX REMOVED)      partial       Allergies:  is allergic to penicillins, cefuroxime, doxycycline, imitrex [sumatriptan], meperidine, sulfa antibiotics, keflex [cephalexin], and tape [adhesive tape]. Social History:    TOBACCO:   reports that she has never smoked. She has never used smokeless tobacco.  ETOH:   reports no history of alcohol use.       Family History:       Problem Relation Age of Onset    Diabetes Mother     High Blood Pressure Mother     Asthma Sister     Other Father        Current Medications:    Current Facility-Administered Medications:     metoprolol (LOPRESSOR) injection 5 mg, 5 mg, IntraVENous, Q1H PRN, Hillary Flowers, , 5 mg at 02/21/23 0442    albuterol (PROVENTIL) nebulizer solution 2.5 mg, 2.5 mg, Nebulization, Q6H PRN, Blu Townsend MD    atorvastatin (LIPITOR) tablet 40 mg, 40 mg, Oral, Daily, Blu Townsend MD, 40 mg at 02/21/23 0831    benzonatate (TESSALON) capsule 100 mg, 100 mg, Oral, Q4H PRN, Blu Townsend MD, 100 mg at 02/21/23 0541    dilTIAZem (CARDIZEM CD) extended release capsule 240 mg, 240 mg, Oral, Daily, Christina Rollins MD, 240 mg at 02/21/23 9824    levothyroxine (SYNTHROID) tablet 125 mcg, 125 mcg, Oral, Daily, Christina Rollins MD, 125 mcg at 02/21/23 0541    LORazepam (ATIVAN) tablet 0.5 mg, 0.5 mg, Oral, Q6H PRN, Christina Rollins MD    mirtazapine (REMERON) tablet 30 mg, 30 mg, Oral, Nightly, Christina Rollins MD, 30 mg at 02/20/23 2158    nadolol (CORGARD) tablet 20 mg, 20 mg, Oral, Daily, Christina Rollins MD, 20 mg at 02/21/23 6087    pantoprazole (PROTONIX) tablet 40 mg, 40 mg, Oral, QAM AC, Christina Rollins MD, 40 mg at 02/21/23 0541    sertraline (ZOLOFT) tablet 200 mg, 200 mg, Oral, Daily, Christina Rollins MD, 200 mg at 02/21/23 7664    traMADol (ULTRAM) tablet 50 mg, 50 mg, Oral, BID PRN, Christina Rollins MD    0.9 % sodium chloride infusion, , IntraVENous, Continuous, Christina Rollins MD, Last Rate: 75 mL/hr at 02/20/23 2138, New Bag at 02/20/23 2138    sodium chloride flush 0.9 % injection 10 mL, 10 mL, IntraVENous, 2 times per day, Christina Rollins MD    sodium chloride flush 0.9 % injection 10 mL, 10 mL, IntraVENous, PRN, Christina Rollins MD    0.9 % sodium chloride infusion, , IntraVENous, PRN, Christina Rollins MD    ondansetron (ZOFRAN-ODT) disintegrating tablet 4 mg, 4 mg, Oral, Q8H PRN **OR** ondansetron (ZOFRAN) injection 4 mg, 4 mg, IntraVENous, Q6H PRN, Christina Rollins MD    polyethylene glycol (GLYCOLAX) packet 17 g, 17 g, Oral, Daily PRN, Christina Rollins MD    enoxaparin Sodium (LOVENOX) injection 30 mg, 30 mg, SubCUTAneous, Daily, Christina Rollins MD, 30 mg at 02/21/23 2932    acetaminophen (TYLENOL) tablet 650 mg, 650 mg, Oral, Q6H PRN **OR** acetaminophen (TYLENOL) suppository 650 mg, 650 mg, Rectal, Q6H PRN, Christina Rollins MD    methylPREDNISolone sodium (SOLU-MEDROL) injection 40 mg, 40 mg, IntraVENous, Q12H, 40 mg at 02/20/23 0699 **FOLLOWED BY** [START ON 2/22/2023] predniSONE (DELTASONE) tablet 40 mg, 40 mg, Oral, Daily, Rik Valverde Camelia Castillo MD    azithromycin (ZITHROMAX) 500 mg in 250 mL addavial, 500 mg, IntraVENous, Q24H, Merri Fleischer, MD, Stopped at 02/21/23 0429    ipratropium-albuterol (DUONEB) nebulizer solution 1 ampule, 1 ampule, Inhalation, 4x daily, Thai Juan MD, 1 ampule at 02/21/23 0836    ipratropium-albuterol (DUONEB) nebulizer solution 1 ampule, 1 ampule, Inhalation, Q4H PRN, Thai Juan MD    glucose chewable tablet 16 g, 4 tablet, Oral, PRN, Kenisha Stuart PA-C    dextrose bolus 10% 125 mL, 125 mL, IntraVENous, PRN **OR** dextrose bolus 10% 250 mL, 250 mL, IntraVENous, PRN, Kenisha Stuart PA-C    glucagon (rDNA) injection 1 mg, 1 mg, SubCUTAneous, PRN, Kenisha Stuart PA-C    dextrose 10 % infusion, , IntraVENous, Continuous PRN, Kenisha Stuart PA-C    insulin lispro (HUMALOG) injection vial 0-4 Units, 0-4 Units, SubCUTAneous, TID WC, Kenisha Stuart PA-C    insulin lispro (HUMALOG) injection vial 0-4 Units, 0-4 Units, SubCUTAneous, Nightly, Kenisha Stuart PA-C      REVIEW OF SYSTEMS:  Constitutional: Negative for fever  HENT: Negative for sore throat  Eyes: Negative for redness   Respiratory: + for dyspnea, cough  Cardiovascular: Negative for chest pain  Gastrointestinal: Negative for vomiting, diarrhea   Genitourinary: Negative for hematuria   Musculoskeletal: Negative for arthralgias   Skin: Negative for rash  Neurological: Negative for syncope  Hematological: Negative for adenopathy  Psychiatric/Behavorial: Negative for anxiety      Objective:   PHYSICAL EXAM:    Blood pressure 135/81, pulse (!) 110, temperature 97.9 °F (36.6 °C), temperature source Oral, resp. rate 20, height 5' 2\" (1.575 m), weight 108 lb 14.4 oz (49.4 kg), SpO2 93 %, not currently breastfeeding.' on RA  Gen: No distress. Eyes: PERRL. No sclera icterus. No conjunctival injection. ENT: No discharge. Pharynx clear. Neck: Trachea midline. No obvious mass. Resp: Rhonchi bilaterally no clear Rales   CV: Regular rate. Regular rhythm.  No murmur or rub. No edema. GI: Non-tender. Non-distended. No hernia. Skin: Warm and dry. No nodule on exposed extremities. Lymph: No cervical LAD. No supraclavicular LAD. M/S: No cyanosis. No joint deformity. No clubbing. Neuro: Awake. Alert. Moves all four extremities. Psych: Oriented x 3. No anxiety. DATA reviewed by me:   CXR  CT       LABS/IMAGING:    CBC:  Lab Results   Component Value Date    WBC 10.9 02/21/2023    HGB 8.8 (L) 02/21/2023    HCT 27.4 (L) 02/21/2023    MCV 80.9 02/21/2023     02/21/2023    LYMPHOPCT 7.2 02/21/2023    RBC 3.39 (L) 02/21/2023    MCH 26.1 02/21/2023    MCHC 32.2 02/21/2023    RDW 15.9 (H) 02/21/2023    NEUTOPHILPCT 85.0 02/21/2023    MONOPCT 7.7 02/21/2023    BASOPCT 0.1 02/21/2023    NEUTROABS 9.3 (H) 02/21/2023    LYMPHSABS 0.8 (L) 02/21/2023    MONOSABS 0.8 02/21/2023    EOSABS 0.0 02/21/2023    BASOSABS 0.0 02/21/2023       Recent Labs     02/21/23  0529 02/20/23  0645 02/19/23 2122   WBC 10.9 7.6 8.5   HGB 8.8* 10.3* 10.0*   HCT 27.4* 32.3* 31.1*   MCV 80.9 79.7* 79.7*    200 183       BMP:   Recent Labs     02/19/23 2122 02/20/23  0645 02/21/23  0529    140 143   K 3.4* 5.1 4.1    101 106   CO2 31 29 31   BUN 10 9 10   CREATININE <0.5* <0.5* <0.5*         MG:   Lab Results   Component Value Date/Time    MG 1.70 02/19/2023 09:22 PM     Ca/Phos:   Lab Results   Component Value Date    CALCIUM 7.7 (L) 02/21/2023    PHOS 4.6 12/08/2022     LIVER PROFILE:   Recent Labs     02/19/23 2122 02/20/23  0645   AST 27 32   ALT 20 20   BILITOT 0.3 0.4   ALKPHOS 118 128         PT/INR: No results for input(s): PROTIME, INR in the last 72 hours. APTT: No results for input(s): APTT in the last 72 hours.     Cardiac Enzymes:  Lab Results   Component Value Date    CKTOTAL 23 (L) 07/10/2019    TROPONINI <0.01 02/19/2023       Assessment:    Acute flareup of ILD  -Based on the biopsies, seems h/o of cryptogenic organizing pneumonia and possibly coexisting NSIP or chronic HP.    -Acute exacerbation of ILD   -History of COPD  -Acute on chronic chronic respiratory failure-      Plan:    Acute flareup with ILD we will increase prednisone to Solu-Medrol 40 mg every 6 hours and watch carefully      *- On pirfenidone   *s/p Pneumothostat with recurrent pneumo,CTS felt no pleurodeisis needed   *- on albuterol as needed  *- will get CXR   *- will see in 3 months   She was asked to go ER with any worsen SOB or P  If not better then we will consider bronchoscopy and rule out atypical infection    Also I will check LDH and PCP PCR from sputum    We will check strep pneumonia respiratory viral panel and pneumonia  molecular panel\        Thank you very much for allowing me to participate in the care of this pleasant patient , should you have any questions ,please do not hesitate to contact me      Gabe Davis MD,[facility]  Pulmonary&Critical Care Medicine    Radha Moeller    NOTE: This report was transcribed using voice recognition software. Every effort was made to ensure accuracy; however, inadvertent computerized transcription errors may be present.

## 2023-02-21 NOTE — PROGRESS NOTES
IM Progress Note    Admit Date:  2/19/2023  1    Interval history:  acute on chronic ILD with hypoxia  Rapid Afibb     Subjective:  Ms. Obando seen up in bed,     Denies any new symptoms of cough or sputum     HR remains high  but sinus tach  On 10 L       Objective:   /81   Pulse (!) 110   Temp 97.9 °F (36.6 °C) (Oral)   Resp 20   Ht 5' 2\" (1.575 m)   Wt 108 lb 14.4 oz (49.4 kg)   SpO2 93%   BMI 19.92 kg/m²   No intake or output data in the 24 hours ending 02/21/23 1023    Physical Exam:   thin elderly female chronically ill appearing  Awake, alert and oriented. Appears to be not in any distress  Mucous Membranes:  Pink , anicteric  Neck: No JVD, no carotid bruit, no thyromegaly  Chest:  improved kenny air entry   kenny bases with scattered chronic crackles ,  Cardiovascular:  RRR S1S2 heard, no murmurs or gallops  Abdomen:  Soft, undistended, non tender, no organomegaly, BS present  Extremities: No edema or cyanosis.  Distal pulses well felt  Neurological : no focal deficits with gen weakness    Medications:   Scheduled Medications:    atorvastatin  40 mg Oral Daily    dilTIAZem  240 mg Oral Daily    levothyroxine  125 mcg Oral Daily    mirtazapine  30 mg Oral Nightly    nadolol  20 mg Oral Daily    pantoprazole  40 mg Oral QAM AC    sertraline  200 mg Oral Daily    sodium chloride flush  10 mL IntraVENous 2 times per day    enoxaparin  30 mg SubCUTAneous Daily    methylPREDNISolone  40 mg IntraVENous Q12H    Followed by    Danie Solano ON 2/22/2023] predniSONE  40 mg Oral Daily    azithromycin  500 mg IntraVENous Q24H    ipratropium-albuterol  1 ampule Inhalation 4x daily    insulin lispro  0-4 Units SubCUTAneous TID WC    insulin lispro  0-4 Units SubCUTAneous Nightly     I   sodium chloride      dextrose       metoprolol, albuterol, benzonatate, LORazepam, traMADol, sodium chloride flush, sodium chloride, ondansetron **OR** ondansetron, polyethylene glycol, acetaminophen **OR** acetaminophen, ipratropium-albuterol, glucose, dextrose bolus **OR** dextrose bolus, glucagon (rDNA), dextrose    Lab Data:  Recent Labs     02/19/23 2122 02/20/23 0645 02/21/23  0529   WBC 8.5 7.6 10.9   HGB 10.0* 10.3* 8.8*   HCT 31.1* 32.3* 27.4*   MCV 79.7* 79.7* 80.9    200 154     Recent Labs     02/19/23 2122 02/20/23  0645 02/21/23  0529    140 143   K 3.4* 5.1 4.1    101 106   CO2 31 29 31   BUN 10 9 10   CREATININE <0.5* <0.5* <0.5*     Recent Labs     02/19/23 2122   TROPONINI <0.01       Coagulation:   Lab Results   Component Value Date/Time    INR 1.15 01/16/2023 11:55 PM    APTT 29.7 01/16/2023 11:55 PM     Cardiac markers:   Lab Results   Component Value Date/Time    CKTOTAL 23 07/10/2019 04:11 PM    TROPONINI <0.01 02/19/2023 09:22 PM         Lab Results   Component Value Date    ALT 20 02/20/2023    AST 32 02/20/2023    ALKPHOS 128 02/20/2023    BILITOT 0.4 02/20/2023       Lab Results   Component Value Date    INR 1.15 (H) 01/16/2023    INR 1.28 (H) 11/28/2022    INR 1.21 (H) 09/15/2022    PROTIME 14.6 (H) 01/16/2023    PROTIME 15.8 (H) 11/28/2022    PROTIME 15.2 (H) 09/15/2022       Radiology    CULTURES  Covid/influenza not detected   Respiratory cultures pending      EKG:    Sinus tachycardia with Premature atrial complexesRight ventricular hypertrophyVoltage criteria for left ventricular hypertrophyPossible Lateral infarct (cited on or before 17-JAN-2023)Inferior infarct (cited on or before 17-JAN-2023)Nonspecific ST abnormalityWhen compared with ECG of 17-JAN-2023 00:36,No significant change was foundConfirmed by FAHAD Jolley MD ([id number]) on 2/20/2023 7:35:40 AM      RADIOLOGY  XR CHEST PORTABLE   Final Result   Chronic findings in the chest without acute airspace disease identified. ECHO 5/2022:  Technically difficult examination. Apical views are off axis secondary to pt   left ribs fracture.    Left ventricular systolic function is normal with ejection fraction estimated at 55%. No regional wall motion abnormalities. There is mild concentric left ventricular hypertrophy. Indeterminate left ventricular filling pressure. Systolic pulmonary artery pressure (SPAP) is normal estimated at 22 mmHg   (Right atrial pressure of 8 mmHg). ASSESSMENT/PLAN:      #Acute on chronic respiratory failure with hypoxia  #Hx cryptogenic organizing pneumonia  # ILD with  acute exacerbation  -baseline 8L w/ ambulation and 4L at rest  -now requiring 10L continuously w/ desats on ambulation and with associated dyspnea   -procal negative  -CXR non-acute  -IV solumedrol started   -sukumar duonebs and prn albuterol  -azithromycin added by pulm  -respiratory culture pending   -pulmonology consulted      #Sinus tachycardia  -likely 2/2 above  -d-dimer negative  -monitor on tele      #PAF  -on diltiazem and nadolol  -not on AC, per daughter due to recurrent epistaxis and chronic anemia   -currently has heart monitor for work-up for watchman implant     #HypoMg  #HypoK  -replaced in ED        #Diastolic CHF  -without apparent exacerbation  -last echo as above   -monitor I&Os and daily weights     #Chronic anemia  - hb stable at 10, slight drop with IVF   -stable; monitor CBC        #Type II DM  -reports no longer requiring meds 2/2 weight loss  -monitor POC glucose, SSI prn     #Hypothyroidism  -continue synthroid     #HLD  -continue statin     #Anxiety  -continue daily sertraline and prn ativan           DVT Prophylaxis: Lovenox  Diet: ADULT DIET;  Regular  Code Status: Full Code       Arline Shields MD, 2/21/2023 10:23 AM

## 2023-02-21 NOTE — FLOWSHEET NOTE
02/21/23 0004   Vital Signs   Temp 97.7 °F (36.5 °C)   Temp Source Oral   Heart Rate (!) 128   Heart Rate Source Monitor   Resp 24   BP (!) 147/71   MAP (Calculated) 96   BP Location Right upper arm   BP Method Automatic   Patient Position Semi fowlers; Lying left side   Level of Consciousness 0   MEWS Score 4   Oxygen Therapy   SpO2 98 %   O2 Device High flow nasal cannula   O2 Flow Rate (L/min) 10 L/min     Vitals taken, shown above. HR is elevated. Prn metoprolol given. Patient is stable with no current needs at this time. Call light in reach. Bed in lowest position.

## 2023-02-21 NOTE — PROGRESS NOTES
4 Eyes Skin Assessment     The patient is being assess for   Admission    I agree that 2 RN's have performed a thorough Head to Toe Skin Assessment on the patient. ALL assessment sites listed below have been assessed. Areas assessed for pressure by both nurses:   [x]   Head, Face, and Ears   [x]   Shoulders, Back, and Chest, Abdomen  [x]   Arms, Elbows, and Hands   [x]   Coccyx, Sacrum, and Ischium  [x]   Legs, Feet, and Heels    Large bruise R foot  Blanchable redness to coccyx  Small peeled area top of spine--daughter states from patient itching        Skin Assessed Under all Medical Devices by both nurses:  O2 device tubing              All Mepilex Borders were peeled back and area peeked at by both nurses:  No:    Please list where Mepilex Borders are located:  None             **SHARE this note so that the co-signing nurse is able to place an eSignature**    Co-signer eSignature: Electronically signed by Laureano Stoll RN on 2/20/23 at 10:01 PM EST    Does the Patient have Skin Breakdown related to pressure?   Yes LDA WOUND CARE was Initiated documentation include the Nelia-wound, Wound Assessment, Measurements, Dressing Treatment, Drainage, and Color\",              Amos Prevention initiated:  Yes   Wound Care Orders initiated:  Yes      [id number] nurse consulted for Pressure Injury (Stage 3,4, Unstageable, DTI, NWPT, Complex wounds)and New or Established Ostomies:  NA      Primary Nurse eSignature: Electronically signed by Yumi Dixon RN on 2/20/23 at 7:28 PM EST

## 2023-02-21 NOTE — PROGRESS NOTES
New orders for stat EKG and prn metoprolol injection added and given to the patient. EKG shows sinus tach.

## 2023-02-21 NOTE — ACP (ADVANCE CARE PLANNING)
Advance Care Planning     General Advance Care Planning (ACP) Conversation    Date of Conversation: 2/19/2023  Conducted with: Patient with Decision Making Capacity    Healthcare Decision Maker:    Primary Decision Maker: Denver Dust - Spouse - [phone]    Secondary Decision Maker: Sharri Beasley - Child - [phone]  Click here to complete Healthcare Decision Makers including selection of the Healthcare Decision Maker Relationship (ie \"Primary\"). Today we documented Decision Maker(s) consistent with ACP documents on file. Content/Action Overview:   Has ACP document(s) on file - reflects the patient's care preferences  Reviewed DNR/DNI and patient elects Full Code (Attempt Resuscitation)    Length of Voluntary ACP Conversation in minutes:  <16 minutes (Non-Billable)    Diana Keating RN

## 2023-02-21 NOTE — PROGRESS NOTES
Patient resting in bed, AM assessment completed. Lungs decreased. O2 at 10L per NC. BS+. IVF at 75/hr. No acute distress noted. Call light in reach. Bed check on for safety. Will continue to monitor.

## 2023-02-21 NOTE — PROGRESS NOTES
Hand off report given to  Marian Vaz RN. Patient is stable showing no signs of distress and has no current needs at this time. Call light is in reach and bed is in lowest position. Care is transferred at this time.

## 2023-02-22 VITALS
TEMPERATURE: 97.5 F | OXYGEN SATURATION: 100 % | RESPIRATION RATE: 20 BRPM | HEART RATE: 108 BPM | DIASTOLIC BLOOD PRESSURE: 88 MMHG | SYSTOLIC BLOOD PRESSURE: 150 MMHG | BODY MASS INDEX: 20.04 KG/M2 | WEIGHT: 108.9 LBS | HEIGHT: 62 IN

## 2023-02-22 LAB
ANION GAP SERPL CALCULATED.3IONS-SCNC: 7 MMOL/L (ref 3–16)
BASOPHILS ABSOLUTE: 0 K/UL (ref 0–0.2)
BASOPHILS RELATIVE PERCENT: 0.3 %
BUN BLDV-MCNC: 8 MG/DL (ref 7–20)
CALCIUM SERPL-MCNC: 8.7 MG/DL (ref 8.3–10.6)
CHLORIDE BLD-SCNC: 100 MMOL/L (ref 99–110)
CO2: 32 MMOL/L (ref 21–32)
CREAT SERPL-MCNC: <0.5 MG/DL (ref 0.6–1.2)
EOSINOPHILS ABSOLUTE: 0 K/UL (ref 0–0.6)
EOSINOPHILS RELATIVE PERCENT: 0.1 %
ESTIMATED AVERAGE GLUCOSE: 105.4 MG/DL
GFR SERPL CREATININE-BSD FRML MDRD: >60 ML/MIN/{1.73_M2}
GLUCOSE BLD-MCNC: 164 MG/DL (ref 70–99)
GLUCOSE BLD-MCNC: 191 MG/DL (ref 70–99)
GLUCOSE BLD-MCNC: 194 MG/DL (ref 70–99)
HBA1C MFR BLD: 5.3 %
HCT VFR BLD CALC: 32.4 % (ref 36–48)
HEMOGLOBIN: 10 G/DL (ref 12–16)
LYMPHOCYTES ABSOLUTE: 0.5 K/UL (ref 1–5.1)
LYMPHOCYTES RELATIVE PERCENT: 6.9 %
MAGNESIUM: 1.9 MG/DL (ref 1.8–2.4)
MCH RBC QN AUTO: 25.4 PG (ref 26–34)
MCHC RBC AUTO-ENTMCNC: 30.9 G/DL (ref 31–36)
MCV RBC AUTO: 82.1 FL (ref 80–100)
MONOCYTES ABSOLUTE: 0.1 K/UL (ref 0–1.3)
MONOCYTES RELATIVE PERCENT: 1.5 %
NEUTROPHILS ABSOLUTE: 6.4 K/UL (ref 1.7–7.7)
NEUTROPHILS RELATIVE PERCENT: 91.2 %
PDW BLD-RTO: 15.6 % (ref 12.4–15.4)
PERFORMED ON: ABNORMAL
PERFORMED ON: ABNORMAL
PLATELET # BLD: 147 K/UL (ref 135–450)
PMV BLD AUTO: 7.8 FL (ref 5–10.5)
POTASSIUM REFLEX MAGNESIUM: 3.3 MMOL/L (ref 3.5–5.1)
RBC # BLD: 3.95 M/UL (ref 4–5.2)
SODIUM BLD-SCNC: 139 MMOL/L (ref 136–145)
WBC # BLD: 7 K/UL (ref 4–11)

## 2023-02-22 PROCEDURE — 6360000002 HC RX W HCPCS: Performed by: INTERNAL MEDICINE

## 2023-02-22 PROCEDURE — 36415 COLL VENOUS BLD VENIPUNCTURE: CPT

## 2023-02-22 PROCEDURE — 85025 COMPLETE CBC W/AUTO DIFF WBC: CPT

## 2023-02-22 PROCEDURE — 6370000000 HC RX 637 (ALT 250 FOR IP): Performed by: INTERNAL MEDICINE

## 2023-02-22 PROCEDURE — 2700000000 HC OXYGEN THERAPY PER DAY

## 2023-02-22 PROCEDURE — 2580000003 HC RX 258: Performed by: INTERNAL MEDICINE

## 2023-02-22 PROCEDURE — 80048 BASIC METABOLIC PNL TOTAL CA: CPT

## 2023-02-22 PROCEDURE — 94761 N-INVAS EAR/PLS OXIMETRY MLT: CPT

## 2023-02-22 PROCEDURE — 99233 SBSQ HOSP IP/OBS HIGH 50: CPT | Performed by: INTERNAL MEDICINE

## 2023-02-22 PROCEDURE — 94640 AIRWAY INHALATION TREATMENT: CPT

## 2023-02-22 PROCEDURE — 83735 ASSAY OF MAGNESIUM: CPT

## 2023-02-22 RX ORDER — POTASSIUM CHLORIDE 20 MEQ/1
40 TABLET, EXTENDED RELEASE ORAL ONCE
Status: COMPLETED | OUTPATIENT
Start: 2023-02-22 | End: 2023-02-22

## 2023-02-22 RX ORDER — PREDNISONE 10 MG/1
TABLET ORAL
Qty: 18 TABLET | Refills: 0 | Status: SHIPPED | OUTPATIENT
Start: 2023-02-22

## 2023-02-22 RX ORDER — ECHINACEA PURPUREA EXTRACT 125 MG
1 TABLET ORAL 2 TIMES DAILY
Qty: 1 EACH | Refills: 3 | Status: SHIPPED | OUTPATIENT
Start: 2023-02-22

## 2023-02-22 RX ORDER — METHYLPREDNISOLONE SODIUM SUCCINATE 40 MG/ML
40 INJECTION, POWDER, LYOPHILIZED, FOR SOLUTION INTRAMUSCULAR; INTRAVENOUS EVERY 6 HOURS SCHEDULED
Status: DISCONTINUED | OUTPATIENT
Start: 2023-02-22 | End: 2023-02-22 | Stop reason: HOSPADM

## 2023-02-22 RX ORDER — AZITHROMYCIN 250 MG/1
500 TABLET, FILM COATED ORAL DAILY
Status: DISCONTINUED | OUTPATIENT
Start: 2023-02-23 | End: 2023-02-22 | Stop reason: HOSPADM

## 2023-02-22 RX ORDER — POTASSIUM CHLORIDE 20 MEQ/1
20 TABLET, EXTENDED RELEASE ORAL DAILY
Qty: 30 TABLET | Refills: 1 | Status: SHIPPED | OUTPATIENT
Start: 2023-02-22

## 2023-02-22 RX ADMIN — LEVOTHYROXINE SODIUM 125 MCG: 125 TABLET ORAL at 05:36

## 2023-02-22 RX ADMIN — IPRATROPIUM BROMIDE AND ALBUTEROL SULFATE 1 AMPULE: 2.5; .5 SOLUTION RESPIRATORY (INHALATION) at 11:04

## 2023-02-22 RX ADMIN — SERTRALINE 200 MG: 100 TABLET, FILM COATED ORAL at 08:32

## 2023-02-22 RX ADMIN — PIRFENIDONE 534 MG: 267 TABLET, FILM COATED ORAL at 08:38

## 2023-02-22 RX ADMIN — PANTOPRAZOLE SODIUM 40 MG: 40 TABLET, DELAYED RELEASE ORAL at 05:36

## 2023-02-22 RX ADMIN — Medication 10 ML: at 08:33

## 2023-02-22 RX ADMIN — NADOLOL 20 MG: 40 TABLET ORAL at 08:32

## 2023-02-22 RX ADMIN — DILTIAZEM HYDROCHLORIDE 240 MG: 240 CAPSULE, COATED, EXTENDED RELEASE ORAL at 08:32

## 2023-02-22 RX ADMIN — POTASSIUM CHLORIDE 40 MEQ: 1500 TABLET, EXTENDED RELEASE ORAL at 08:38

## 2023-02-22 RX ADMIN — METHYLPREDNISOLONE SODIUM SUCCINATE 40 MG: 40 INJECTION, POWDER, FOR SOLUTION INTRAMUSCULAR; INTRAVENOUS at 01:33

## 2023-02-22 RX ADMIN — ENOXAPARIN SODIUM 30 MG: 100 INJECTION SUBCUTANEOUS at 08:32

## 2023-02-22 RX ADMIN — AZITHROMYCIN DIHYDRATE 500 MG: 500 INJECTION, POWDER, LYOPHILIZED, FOR SOLUTION INTRAVENOUS at 02:29

## 2023-02-22 RX ADMIN — LORAZEPAM 0.5 MG: 0.5 TABLET ORAL at 02:31

## 2023-02-22 RX ADMIN — IPRATROPIUM BROMIDE AND ALBUTEROL SULFATE 1 AMPULE: 2.5; .5 SOLUTION RESPIRATORY (INHALATION) at 07:47

## 2023-02-22 RX ADMIN — ATORVASTATIN CALCIUM 40 MG: 40 TABLET, FILM COATED ORAL at 08:32

## 2023-02-22 ASSESSMENT — PAIN SCALES - GENERAL: PAINLEVEL_OUTOF10: 0

## 2023-02-22 NOTE — ACP (ADVANCE CARE PLANNING)
Responded to consult to assist pt complete Advanced Directives. Pt has a document from the past. Pt named her spouse as primary decision maker, while her daughter is her secondary decision maker. Pt said her decision did not change, so she likes to keep the already completed document. No changes at this time. Spiritual care available for support.

## 2023-02-22 NOTE — DISCHARGE INSTR - COC
Continuity of Care Form    Patient Name: Brandi Marti   :  1955  MRN:  [id number]    Admit date:  2023  Discharge date:  2023    Code Status Order: Full Code   Advance Directives:     Admitting Physician:  Yara Wilson MD  PCP: Eva Saab MD    Discharging Nurse: [facility] Unit/Room#: /6089-66  Discharging Unit Phone Number: ***    Emergency Contact:   Extended Emergency Contact Information  Primary Emergency Contact: [facility]  Address: [address]           [facility] g[facility] [address] Kristin Kaia of [address] [place] [address] Phone: [phone]  Mobile Phone: [phone]  Relation: Spouse  Secondary Emergency Contact: [facility]  Address: [address] [facility] [facility]           Meet JONES   Home Phone: [phone]  Mobile Phone: [phone]  Relation: Child    Past Surgical History:  Past Surgical History:   Procedure Laterality Date    ARM SURGERY Left 3/2/2020    LEFT ULNAR NERVE DECOMPRESSION AT THE ELBOW performed by Eliane Sharma MD at [address] [place] [address] 2019    EBUS WF W/ANES.  performed by [facility]  2019    BRONCHOSCOPY/TRANSBRONCHIAL NEEDLE BIOPSY performed by Jamel Dietz MD at [facility]  2019    BRONCHOSCOPY/TRANSBRONCHIAL LUNG BIOPSY performed by Jamel Dietz MD at [facility]  2019    BRONCHOSCOPY ALVEOLAR LAVAGE performed by Jamel Dietz MD at [facility]  07/10/2019    BRONCHOSCOPY N/A 7/10/2019    BRONCHOSCOPY ALVEOLAR LAVAGE performed by Balbina Martin MD at [facility] Bilateral 2019    BRONCHOSCOPY N/A 2019    BRONCHOSCOPY ALVEOLAR LAVAGE performed by Shelby Rivas MD at [facility] N/A 2019    BRONCHOSCOPY ALVEOLAR LAVAGE performed by Margareth Patterson MD at [address] [place] [address] N/A 2022 COLONOSCOPY POLYPECTOMY SNARE/COLD BIOPSY performed by Nahomy Marino MD at [address] [place]  9/15/2022    CT GUIDED CHEST TUBE 9/15/2022 [address] SCAN    CT INSERT CATH PLEURA W IMAGE  11/28/2022    CT INSERT CATH PLEURA W IMAGE 11/28/2022 Marianne Manzanares MD [facility] CT SCAN    HYSTERECTOMY, TOTAL ABDOMINAL (CERVIX REMOVED)      partial       Immunization History:   Immunization History   Administered Date(s) Administered    COVID-19, MODERNA BLUE border, Primary or Immunocompromised, (age 12y+), IM, 100 mcg/0.5mL 03/25/2021, 04/22/2021    Influenza Vaccine, unspecified formulation 02/13/2016    Influenza Virus Vaccine 01/15/2016, 02/13/2016    Influenza, FLUAD, (age 72 y+), Adjuvanted, 0.5mL 11/23/2020, 11/17/2021    Influenza, Triv, inactivated, subunit, adjuvanted, IM (Fluad 65 yrs and older) 11/14/2019    Pneumococcal Conjugate 13-valent ([id number]) 06/18/2019    Pneumococcal Polysaccharide ([id number]) 11/14/2013, 04/03/2014    Tdap (Boostrix, Adacel) 02/04/2021       Active Problems:  Patient Active Problem List   Diagnosis Code    Chest pain R07.9    HTN (hypertension) I10    Hyperlipidemia with target LDL less than 130 E78.5    Hypokalemia E87.6    Insomnia G47.00    Trochanteric bursitis of both hips M70.61, M70.62    Migraine G43.909    Syncope R55    Acute blood loss anemia D62    Fatigue R53.83    Hypothyroidism E03.9    Mild single current episode of major depressive disorder ([facility]) F32.0    Anxiety F41.9    Pneumonia of both lower lobes due to infectious organism J18.9    A-fib ([facility]) I48.91    Atypical pneumonia J18.9    Acute bronchitis with bronchospasm J20.9    Acute on chronic diastolic CHF (congestive heart failure) ([facility]) I50.33    Class 2 obesity with body mass index (BMI) of 39.0 to 39.9 in adult E66.9, Z68.39    Abnormal chest CT M23.77    Acute diastolic heart failure ([facility]) I50.31    ILD (interstitial lung disease) ([facility]) J84.9    Acute on chronic respiratory failure with hypoxia ([facility] J96.21    Restrictive lung disease J98.4    Abnormal CT of the chest R93.89    SOB (shortness of breath) R06.02    Acute cystitis without hematuria N30.00    Dyspnea and respiratory abnormalities R06.00, R06.89    Acute on chronic respiratory failure with hypoxemia ([facility]) J96.21    Cryptogenic organizing pneumonia ([facility]) J84.116    Pneumothorax of left lung after biopsy J95.811    COPD (chronic obstructive pulmonary disease) ([facility]) J44.9    Type 2 diabetes mellitus without complication ([facility]) G89.6    Hyponatremia E87.1    Numbness and tingling in left hand R20.0, R20.2    Ulnar neuropathy at elbow of left upper extremity G56.22    Acute congestive heart failure ([facility]) I50.9    Left wrist pain M25.532    Left wrist tendinitis M77.8    GERD (gastroesophageal reflux disease) P13.7    Toxic metabolic encephalopathy D32.5    VIRGINIE (acute kidney injury) ([facility]) N17.9    Lumbar radiculopathy M54.16    Acute respiratory failure with hypoxia and hypercapnia ([facility]) J96.01, J96.02    Rib pain on left side R07.81    Elevated brain natriuretic peptide (BNP) level R79.89    Fracture of multiple ribs of left side S22.42XA    Interstitial lung disease ([facility]) J84.9    Hypomagnesemia E83.42    Depression F32. A    Chronic diastolic congestive heart failure ([facility]) I50.32    Burst fracture of lumbar vertebra ([facility] S32.001A    H/O Spinal surgery Z98.890    Severe malnutrition ([facility] E43    Pneumonia due to COVID-19 virus U07.1, J12.82    Primary spontaneous pneumothorax J93.11    Secondary spontaneous pneumothorax J93.12    COVID-19 U07.1    Acute on chronic respiratory failure ([facility]) J96.20    Diarrhea R19.7    Pulmonary fibrosis ([facility]) J84.10    PSVT (paroxysmal supraventricular tachycardia) ([facility]) I47.1    PAF (paroxysmal atrial fibrillation) ([facility]) I48.0    SVT (supraventricular tachycardia) ([facility]) I47.1    Pneumothorax J93.9    Tension pneumothorax J93.0    Dependence on supplemental oxygen Z99.81    Atrial fibrillation with RVR [facility]) I48.91    Chronic hypoxemic respiratory failure (HCC) J96.11    COPD exacerbation (HCC) J44.1    Acute hypoxemic respiratory failure (HCC) J96.01    Epistaxis R04.0    History of COPD Z87.09       Isolation/Infection:   Isolation            No Isolation          Patient Infection Status       Infection Onset Added Last Indicated Last Indicated By Review Planned Expiration Resolved Resolved By    None active    Resolved    COVID-19 (Rule Out) 02/19/23 02/19/23 02/19/23 COVID-19, Rapid (Ordered)   02/19/23 Rule-Out Test Resulted    COVID-19 (Rule Out) 01/17/23 01/17/23 01/17/23 COVID-19, Rapid (Ordered)   01/17/23 Rule-Out Test Resulted    COVID-19 (Rule Out) 11/26/22 11/26/22 11/26/22 COVID-19, Rapid (Ordered)   11/26/22 Rule-Out Test Resulted    COVID-19 (Rule Out) 10/19/22 10/19/22 10/19/22 COVID-19 & Influenza Combo (Ordered)   10/19/22 Rule-Out Test Resulted    C-diff Rule Out 10/19/22 10/19/22 10/19/22 GI Bacterial Pathogens By PCR (Ordered)   10/19/22 Rule-Out Test Resulted    COVID-19 09/09/22 09/09/22 09/09/22 COVID-19 & Influenza Combo   09/19/22 Sherron Duran, IMCK    Pt is asymptomatic and 5 days or greater since positive testing, per Dr and PCU staff,     COVID-19 (Rule Out) 09/09/22 09/09/22 09/09/22 COVID-19 & Influenza Combo (Ordered)   09/09/22 Rule-Out Test Resulted    COVID-19 (Rule Out) 07/14/22 07/14/22 07/14/22 COVID-19, Rapid (Ordered)   07/14/22 Rule-Out Test Resulted    COVID-19 (Rule Out) 07/04/22 07/04/22 07/04/22 COVID-19, Rapid (Ordered)   07/04/22 Rule-Out Test Resulted    COVID-19 (Rule Out) 05/06/22 05/06/22 05/06/22 COVID-19, Rapid (Ordered)   05/06/22 Rule-Out Test Resulted    COVID-19 (Rule Out) 04/18/22 04/18/22 04/18/22 COVID-19 & Influenza Combo (Ordered)   04/18/22 Rule-Out Test Resulted    COVID-19 (Rule Out) 01/25/22 01/25/22 01/25/22 COVID-19 & Influenza Combo (Ordered)   01/25/22 Rule-Out Test Resulted    COVID-19 (Rule Out) 01/24/22 01/24/22 01/24/22 COVID-19, Rapid (Ordered)   01/24/22 Rule-Out Test Resulted    COVID-19 (Rule Out) 06/18/21 06/18/21 06/18/21 COVID-19 & Influenza Combo (Ordered)   06/18/21 Rule-Out Test Resulted            Nurse Assessment:  Last Vital Signs: BP (!) 150/88   Pulse (!) 108   Temp 97.5 °F (36.4 °C) (Oral)   Resp 20   Ht 5' 2\" (1.575 m)   Wt 108 lb 14.4 oz (49.4 kg)   SpO2 94%   BMI 19.92 kg/m²     Last documented pain score (0-10 scale):    Last Weight:   Wt Readings from Last 1 Encounters:   02/21/23 108 lb 14.4 oz (49.4 kg)     Mental Status:  {IP PT MENTAL STATUS:20030}    IV Access:  { CAROLANN IV ACCESS:844593478}    Nursing Mobility/ADLs:  Walking   {P DME HXXV:761721801}  Transfer  {P DME ATFY:550604401}  Bathing  {P DME GVOB:383729001}  Dressing  {P DME CCIM:702493861}  Toileting  {P DME DDRF:398328348}  Feeding  {[facility] DME ATUJ:165740134}  Med Admin  {[facility] DME RLSV:052405633}  Med Delivery   { CAROLANN MED Delivery:673803954}    Wound Care Documentation and Therapy:        Elimination:  Continence: Bowel: {YES / YF:88418}  Bladder: {YES / QH:36203}  Urinary Catheter: {Urinary Catheter:047948909}   Colostomy/Ileostomy/Ileal Conduit: {YES / IF:34232}       Date of Last BM: ***    Intake/Output Summary (Last 24 hours) at 2/22/2023 1041  Last data filed at 2/22/2023 0903  Gross per 24 hour   Intake 420 ml   Output 200 ml   Net 220 ml     I/O last 3 completed shifts:   In: 240 [P.O.:240]  Out: 200 [Urine:200]    Safety Concerns:     [address] Safety Concerns:509993498}    Impairments/Disabilities:      [address] Impairments/Disabilities:487380692}    Nutrition Therapy:  Current Nutrition Therapy:   [address] Diet List:666454618}    Routes of Feeding: {CHP DME Other Feedings:805123357}  Liquids: {Slp liquid thickness:39156}  Daily Fluid Restriction: {CHP DME Yes amt example:317637402}  Last Modified Barium Swallow with Video (Video Swallowing Test): {Done Not Done JN:752460337}    Treatments at the Time of Hospital Discharge:   Respiratory Treatments: ***  Oxygen Therapy:  {Therapy; copd oxygen:17881}  Ventilator:    { CC Vent GRDO:117916816}    Rehab Therapies: {THERAPEUTIC INTERVENTION:3320884805}  Weight Bearing Status/Restrictions: 50Alexa REYNAGA Weight Bearin}  Other Medical Equipment (for information only, NOT a DME order):  {EQUIPMENT:611948673}  Other Treatments: ***    Patient's personal belongings (please select all that are sent with patient):  {CHP DME Belongings:015731378}    RN SIGNATURE:  {Esignature:473949621}    CASE MANAGEMENT/SOCIAL WORK SECTION    Inpatient Status Date: 2023    Readmission Risk Assessment Score:  Readmission Risk              Risk of Unplanned Readmission:  46           Discharging to Facility/ Agency   Name: [facility]  Address: [address] [place] [address]  Phone: [phone]  Fax: fax [phone]     Dialysis Facility (if applicable)   Name:  Address:  Dialysis Schedule:  Phone:  Fax:    / signature: Electronically signed by Angel Jeronimo RN on 23 at 10:41 AM EST    PHYSICIAN SECTION    Prognosis: {Prognosis:1128000155}    Condition at Discharge: 50Alexa García Patient Condition:584325989}    Rehab Potential (if transferring to Rehab): {Prognosis:9651625595}    Recommended Labs or Other Treatments After Discharge: ***    Physician Certification: I certify the above information and transfer of Luiza Lay  is necessary for the continuing treatment of the diagnosis listed and that she requires Home Care for greater 30 days.      Update Admission H&P: Changes in H&P as follows - see attached    PHYSICIAN SIGNATURE:  Dr. Marianne Hirsch MD/{Esignature:[id number]}

## 2023-02-22 NOTE — PROGRESS NOTES
Patient sitting on side of bed, NRB in place. Report given to [facility] RN for transfer of care.

## 2023-02-22 NOTE — PROGRESS NOTES
Hand off report given to  Regis Londono RN. Patient is stable showing no signs of distress and has no current needs at this time. Call light is in reach and bed is in lowest position. Care is transferred at this time.

## 2023-02-22 NOTE — DISCHARGE SUMMARY
Name:  Maday Carmona  Room:  /3415-15  MRN:    [id number]    Discharge Summary      This discharge summary is in conjunction with a complete physical exam done on the day of discharge. Discharging Provider: Dr. Ricci Manzanares MD      Admit: 2/19/2023  Discharge: 02/22/23      HPI taken from admission H&P:    The patient is a 77 y.o. female with PMHx as below who presented to \[facility]\"" ED with complaint of SOB. Patient's baseline O2 requirement is 4L O2 at rest, and 8L when ambulatory. Recently requiring 8L continuously due to progressive SOB and LEE. Has noted O2 desaturations into 80s on her home pulse ox, despite increased supplemental O2. Denies fever, chills, n/v/d, cough, chest pain, or edema.     Diagnoses this Admission and Hospital Course   #Acute on chronic respiratory failure with hypoxia  #Hx cryptogenic organizing pneumonia  # ILD with  acute exacerbation  -baseline 8L w/ ambulation and 6L at rest  -was requiring 10L continuously w/ desats on ambulation and with associated dyspnea   -procal negative  -CXR non-acute  -IV solumedrol started   -sukumar duonebs and prn albuterol  -azithromycin added by pulm  -resume pirfenidone   -respiratory culture remain neg   -improved and now back to home level of 6L   -dc home on prednisone taper     #Sinus tachycardia  -likely 2/2 above  -d-dimer negative  -monitor on tele  -resumed home nadolol and cardizem improved         #PAF  -on diltiazem and nadolol  -not on AC, per daughter due to recurrent epistaxis and chronic anemia   -currently has heart monitor for work-up for watchman implant     #HypoMg  #HypoK  -sec to chronic diarrhea with ILD meds  -replaced as needed     #Diastolic CHF  -without apparent exacerbation  -last echo as above   -monitor I&Os and daily weights  -does not take lasix any more      #Chronic anemia  -Hgb stable at 10; monitored CBC     #Type II DM  -reports no longer requiring meds 2/2 weight loss  -monitor POC glucose, SSI prn #Hypothyroidism  -continue synthroid     #HLD  -continue statin     #Anxiety  -continue daily sertraline and prn ativan    Procedures (Please Review Full Report for Details)  N/A    Consults    IP CONSULT TO HOSPITALIST  IP CONSULT TO PULMONOLOGY  IP CONSULT TO SPIRITUAL SERVICES  IP CONSULT TO HOME CARE NEEDS      Physical Exam at Discharge:  BP (!) 150/88   Pulse (!) 108   Temp 97.5 °F (36.4 °C) (Oral)   Resp 20   Ht 5' 2\" (1.575 m)   Wt 108 lb 14.4 oz (49.4 kg)   SpO2 100%   BMI 19.92 kg/m²   Thin elderly female chronically ill appearing  Awake, alert and oriented. Appears to be not in any distress  Mucous Membranes:  Pink , anicteric  Neck: No JVD, no carotid bruit, no thyromegaly  Chest:  improved kenny air entry   kenny bases with scattered chronic crackles ,  Cardiovascular:  RRR S1S2 heard, no murmurs or gallops  Abdomen:  Soft, undistended, non tender, no organomegaly, BS present  Extremities: No edema or cyanosis. Distal pulses well felt  Neurological : no focal deficits with gen weakness       CBC:   Recent Labs     02/20/23  0645 02/21/23  0529 02/22/23  0548   WBC 7.6 10.9 7.0   HGB 10.3* 8.8* 10.0*   HCT 32.3* 27.4* 32.4*   MCV 79.7* 80.9 82.1    154 147     BMP:   Recent Labs     02/20/23  0645 02/21/23  0529 02/22/23  0548    143 139   K 5.1 4.1 3.3*    106 100   CO2 29 31 32   BUN 9 10 8   CREATININE <0.5* <0.5* <0.5*     LIVER PROFILE:   Recent Labs     02/19/23 2122 02/20/23  0645   AST 27 32   ALT 20 20   BILITOT 0.3 0.4   ALKPHOS 118 128     Lab Results   Component Value Date    INR 1.15 (H) 01/16/2023    INR 1.28 (H) 11/28/2022    INR 1.21 (H) 09/15/2022    PROTIME 14.6 (H) 01/16/2023    PROTIME 15.8 (H) 11/28/2022    PROTIME 15.2 (H) 09/15/2022       CULTURES  Covid/influenza not detected   Respiratory cultures pending     RADIOLOGY  XR CHEST PORTABLE   Final Result   Chronic findings in the chest without acute airspace disease identified.            ECHO 5/2022:  Technically difficult examination. Apical views are off axis secondary to pt   left ribs fracture. Left ventricular systolic function is normal with ejection fraction   estimated at 55%. No regional wall motion abnormalities. There is mild concentric left ventricular hypertrophy. Indeterminate left ventricular filling pressure. Systolic pulmonary artery pressure (SPAP) is normal estimated at 22 mmHg   (Right atrial pressure of 8 mmHg).       Discharge Medications     Medication List        START taking these medications      potassium chloride 20 MEQ extended release tablet  Commonly known as: KLOR-CON M  Take 1 tablet by mouth daily     sodium chloride 0.65 % nasal spray  Commonly known as: OCEAN  1 spray by Nasal route in the morning and at bedtime            CONTINUE taking these medications      * albuterol (2.5 MG/3ML) 0.083% nebulizer solution  Commonly known as: PROVENTIL  INHALE THE CONTENTS OF 1 VIAL VIA NEBULIZER EVERY 6 HOURS AS NEEDED FOR WHEEZING     * albuterol sulfate  (90 Base) MCG/ACT inhaler  Commonly known as: PROVENTIL;VENTOLIN;PROAIR  INHALE 2 PUFFS INTO THE LUNGS EVERY 6 HOURS AS NEEDED FOR WHEEZING     atorvastatin 40 MG tablet  Commonly known as: LIPITOR  TAKE 1 TABLET EVERY DAY     benzonatate 200 MG capsule  Commonly known as: TESSALON  TAKE 1 CAPSULE BY MOUTH 3 TIMES A DAY AS NEEDED FOR COUGH     dilTIAZem 240 MG extended release capsule  Commonly known as: CARDIZEM CD  Take 1 capsule by mouth daily     furosemide 40 MG tablet  Commonly known as: LASIX  Take 0.5 tablets by mouth See Admin Instructions Daily prn for edema     guaiFENesin 600 MG extended release tablet  Commonly known as: MUCINEX     hydrOXYzine HCl 10 MG tablet  Commonly known as: ATARAX     levothyroxine 125 MCG tablet  Commonly known as: SYNTHROID  Take 1 tablet by mouth Daily TAKE 1 TABLET BY MOUTH EVERY DAY     LORazepam 0.5 MG tablet  Commonly known as: ATIVAN     mirtazapine 30 MG tablet  Commonly known as: REMERON  TAKE 1 TABLET EVERY NIGHT     nadolol 20 MG tablet  Commonly known as: CORGARD  Take 1 tablet by mouth daily     omeprazole 40 MG delayed release capsule  Commonly known as: PRILOSEC  Take 1 capsule by mouth every morning (before breakfast)     ondansetron 4 MG tablet  Commonly known as: ZOFRAN  Take 1 tablet by mouth 3 times daily as needed for Nausea or Vomiting     Pirfenidone 267 MG Tabs  pirfenidone daily for a week and then BID for a week and then back to TID. predniSONE 10 MG tablet  Commonly known as: DELTASONE  30 mg x 3 days, 20 mg x 3 days, 10 mg x 3 days then stop     sertraline 100 MG tablet  Commonly known as: ZOLOFT  TAKE 2 TABLETS EVERY DAY     traMADol 50 MG tablet  Commonly known as: ULTRAM     WOMENS ONE DAILY PO           * This list has 2 medication(s) that are the same as other medications prescribed for you. Read the directions carefully, and ask your doctor or other care provider to review them with you. STOP taking these medications      [employer] Allergy Relief-D12 5-120 MG per extended release tablet  Generic drug: loratadine-pseudoephedrine     methocarbamol 750 MG tablet  Commonly known as: ROBAXIN     pantoprazole 40 MG tablet  Commonly known as: PROTONIX               Where to Get Your Medications        These medications were sent to [id number] [facility], [address] [facility],[address]  [id number], [address] [facility] [address]      Phone: [phone]   potassium chloride 20 MEQ extended release tablet  predniSONE 10 MG tablet  sodium chloride 0.65 % nasal spray           Discharged in stable condition to home    Follow Up:   Follow up with PCP in 1 week***

## 2023-02-22 NOTE — DISCHARGE INSTRUCTIONS
Heart Failure Resources:    Heart Failure Interactive Workbook:   Go to [email or web address]­ssima.com/aha-heartfailure for a Free Heart Failure Interactive Workbook provided by [employer]. This interactive workbook will provide information on Healthier Living with Heart Failure. Please copy and paste link into search bar. Use your mouse to scroll through the pages. HF [place] genny:   Heart Failure Free smart phone genny available for iPhone and Android download. Use your phone to track sodium intake, fluid intake, symptoms, and weight. Low Sodium Diet:  Go to www. [employer].com. [employer] website for [employer] which is Low Sodium! [employer] is a dialysis company, but this website offers free seasonal cookbooks. Each quarter, they will release 25-30 new recipes with a breakdown of calories, sodium, and glucose. Recipes:   Go to www.Sonda41/recipes website for free recipes. Shortness of Breath: Care Instructions  Your Care Instructions     Shortness of breath has many causes. Sometimes conditions such as anxiety can lead to shortness of breath. Some people get mild shortness of breath when they exercise. Trouble breathing also can be a symptom of a serious problem, such as asthma, lung disease, emphysema, heart problems, and pneumonia. If your shortness of breath continues, you may need tests and treatment. Watch for any changes in your breathing and other symptoms. Follow-up care is a key part of your treatment and safety. Be sure to make and go to all appointments, and call your doctor if you are having problems. It's also a good idea to know your test results and keep a list of the medicines you take. How can you care for yourself at home? Do not smoke or allow others to smoke around you. If you need help quitting, talk to your doctor about stop-smoking programs and medicines. These can increase your chances of quitting for good. Get plenty of rest and sleep.   Take your medicines exactly as prescribed. Call your doctor if you think you are having a problem with your medicine. Find healthy ways to deal with stress. Exercise daily. Get plenty of sleep. Eat regularly and well. When should you call for help? Call 911 anytime you think you may need emergency care. For example, call if:    You have severe shortness of breath. You have symptoms of a heart attack. These may include:  Chest pain or pressure, or a strange feeling in the chest.  Sweating. Shortness of breath. Nausea or vomiting. Pain, pressure, or a strange feeling in the back, neck, jaw, or upper belly or in one or both shoulders or arms. Lightheadedness or sudden weakness. A fast or irregular heartbeat. After you call 911, the  may tell you to chew 1 adult-strength or 2 to 4 low-dose aspirin. Wait for an ambulance. Do not try to drive yourself. Call your doctor now or seek immediate medical care if:    Your shortness of breath gets worse or you start to wheeze. Wheezing is a high-pitched sound when you breathe. You wake up at night out of breath or have to prop your head up on several pillows to breathe. You are short of breath after only light activity or while at rest.   Watch closely for changes in your health, and be sure to contact your doctor if:    You do not get better over the next 1 to 2 days. Where can you learn more? Go to http://www.woods.com/ and enter S780 to learn more about \"Shortness of Breath: Care Instructions. \"  Current as of: March 9, 2022               Content Version: 13.5  © 0637-3083 Healthwise, Incorporated. Care instructions adapted under license by [facility] [employer] [facility] ([facility]). If you have questions about a medical condition or this instruction, always ask your healthcare professional. [place] [address] any warranty or liability for your use of this information.

## 2023-02-22 NOTE — PROGRESS NOTES
RT Inhaler-Nebulizer Bronchodilator Protocol Note    There is a bronchodilator order in the chart from a provider indicating to follow the RT Bronchodilator Protocol and there is an Initiate RT Inhaler-Nebulizer Bronchodilator Protocol order as well (see protocol at bottom of note). CXR Findings:  XR CHEST PORTABLE    Result Date: 2/19/2023  Chronic findings in the chest without acute airspace disease identified. The findings from the last RT Protocol Assessment were as follows:   History Pulmonary Disease: (P) Chronic pulmonary disease  Respiratory Pattern: (P) Regular pattern and RR 12-20 bpm  Breath Sounds: (P) Slightly diminished and/or crackles  Cough: (P) Weak, productive  Indication for Bronchodilator Therapy: (P) Decreased or absent breath sounds  Bronchodilator Assessment Score: (P) 6    Aerosolized bronchodilator medication orders have been revised according to the RT Inhaler-Nebulizer Bronchodilator Protocol below. Respiratory Therapist to perform RT Therapy Protocol Assessment initially then follow the protocol. Repeat RT Therapy Protocol Assessment PRN for score 0-3 or on second treatment, BID, and PRN for scores above 3. No Indications - adjust the frequency to every 6 hours PRN wheezing or bronchospasm, if no treatments needed after 48 hours then discontinue using Per Protocol order mode. If indication present, adjust the RT bronchodilator orders based on the Bronchodilator Assessment Score as indicated below. Use Inhaler orders unless patient has one or more of the following: on home nebulizer, not able to hold breath for 10 seconds, is not alert and oriented, cannot activate and use MDI correctly, or respiratory rate 25 breaths per minute or more, then use the equivalent nebulizer order(s) with same Frequency and PRN reasons based on the score. If a patient is on this medication at home then do not decrease Frequency below that used at home.     0-3 - enter or revise RT bronchodilator order(s) to equivalent RT Bronchodilator order with Frequency of every 4 hours PRN for wheezing or increased work of breathing using Per Protocol order mode. 4-6 - enter or revise RT Bronchodilator order(s) to two equivalent RT bronchodilator orders with one order with BID Frequency and one order with Frequency of every 4 hours PRN wheezing or increased work of breathing using Per Protocol order mode. 7-10 - enter or revise RT Bronchodilator order(s) to two equivalent RT bronchodilator orders with one order with TID Frequency and one order with Frequency of every 4 hours PRN wheezing or increased work of breathing using Per Protocol order mode. 11-13 - enter or revise RT Bronchodilator order(s) to one equivalent RT bronchodilator order with QID Frequency and an Albuterol order with Frequency of every 4 hours PRN wheezing or increased work of breathing using Per Protocol order mode. Greater than 13 - enter or revise RT Bronchodilator order(s) to one equivalent RT bronchodilator order with every 4 hours Frequency and an Albuterol order with Frequency of every 2 hours PRN wheezing or increased work of breathing using Per Protocol order mode.        Electronically signed by Berny García RCP on 2/21/2023 at 7:55 PM

## 2023-02-22 NOTE — PROGRESS NOTES
IM Progress Note    Admit Date:  2/19/2023  2    Interval history:  acute on chronic ILD with hypoxia  Rapid Afibb     Subjective:  Ms. Obando seen up in bed, in fetal position as always  Diarrhea remains which is chronic from her ILD meds  Back down to 6 L as at home     Denies any new symptoms of cough or sputum    sinus tach        Objective:   BP (!) 150/88   Pulse 99   Temp 97.5 °F (36.4 °C) (Oral)   Resp 20   Ht 5' 2\" (1.575 m)   Wt 108 lb 14.4 oz (49.4 kg)   SpO2 97%   BMI 19.92 kg/m²     Intake/Output Summary (Last 24 hours) at 2/22/2023 0755  Last data filed at 2/22/2023 0302  Gross per 24 hour   Intake 240 ml   Output 200 ml   Net 40 ml       Physical Exam:   thin elderly female chronically ill appearing  Awake, alert and oriented. Appears to be not in any distress  Mucous Membranes:  Pink , anicteric  Neck: No JVD, no carotid bruit, no thyromegaly  Chest:  improved kenny air entry   kenny bases with scattered chronic crackles ,  Cardiovascular:  RRR S1S2 heard, no murmurs or gallops  Abdomen:  Soft, undistended, non tender, no organomegaly, BS present  Extremities: No edema or cyanosis.  Distal pulses well felt  Neurological : no focal deficits with gen weakness    Medications:   Scheduled Medications:    Pirfenidone  534 mg Oral BID    atorvastatin  40 mg Oral Daily    dilTIAZem  240 mg Oral Daily    levothyroxine  125 mcg Oral Daily    mirtazapine  30 mg Oral Nightly    nadolol  20 mg Oral Daily    pantoprazole  40 mg Oral QAM AC    sertraline  200 mg Oral Daily    sodium chloride flush  10 mL IntraVENous 2 times per day    enoxaparin  30 mg SubCUTAneous Daily    predniSONE  40 mg Oral Daily    azithromycin  500 mg IntraVENous Q24H    ipratropium-albuterol  1 ampule Inhalation 4x daily    insulin lispro  0-4 Units SubCUTAneous TID WC    insulin lispro  0-4 Units SubCUTAneous Nightly     I   sodium chloride      dextrose       metoprolol, albuterol, benzonatate, LORazepam, traMADol, sodium chloride flush, sodium chloride, ondansetron **OR** ondansetron, polyethylene glycol, acetaminophen **OR** acetaminophen, ipratropium-albuterol, glucose, dextrose bolus **OR** dextrose bolus, glucagon (rDNA), dextrose    Lab Data:  Recent Labs     02/20/23  0645 02/21/23  0529 02/22/23  0548   WBC 7.6 10.9 7.0   HGB 10.3* 8.8* 10.0*   HCT 32.3* 27.4* 32.4*   MCV 79.7* 80.9 82.1    154 147       Recent Labs     02/20/23  0645 02/21/23  0529 02/22/23  0548    143 139   K 5.1 4.1 3.3*    106 100   CO2 29 31 32   BUN 9 10 8   CREATININE <0.5* <0.5* <0.5*       Recent Labs     02/19/23 2122   TROPONINI <0.01         Coagulation:   Lab Results   Component Value Date/Time    INR 1.15 01/16/2023 11:55 PM    APTT 29.7 01/16/2023 11:55 PM     Cardiac markers:   Lab Results   Component Value Date/Time    CKTOTAL 23 07/10/2019 04:11 PM    TROPONINI <0.01 02/19/2023 09:22 PM         Lab Results   Component Value Date    ALT 20 02/20/2023    AST 32 02/20/2023    ALKPHOS 128 02/20/2023    BILITOT 0.4 02/20/2023       Lab Results   Component Value Date    INR 1.15 (H) 01/16/2023    INR 1.28 (H) 11/28/2022    INR 1.21 (H) 09/15/2022    PROTIME 14.6 (H) 01/16/2023    PROTIME 15.8 (H) 11/28/2022    PROTIME 15.2 (H) 09/15/2022       Radiology    CULTURES  Covid/influenza not detected   Respiratory cultures pending      EKG:    Sinus tachycardia with Premature atrial complexesRight ventricular hypertrophyVoltage criteria for left ventricular hypertrophyPossible Lateral infarct (cited on or before 17-JAN-2023)Inferior infarct (cited on or before 17-JAN-2023)Nonspecific ST abnormalityWhen compared with ECG of 17-JAN-2023 00:36,No significant change was foundConfirmed by VANE Giordano MDHEN ([id number]) on 2/20/2023 7:35:40 AM      RADIOLOGY  XR CHEST PORTABLE   Final Result   Chronic findings in the chest without acute airspace disease identified. ECHO 5/2022:  Technically difficult examination.  Apical views are off axis secondary to pt   left ribs fracture. Left ventricular systolic function is normal with ejection fraction   estimated at 55%. No regional wall motion abnormalities. There is mild concentric left ventricular hypertrophy. Indeterminate left ventricular filling pressure. Systolic pulmonary artery pressure (SPAP) is normal estimated at 22 mmHg   (Right atrial pressure of 8 mmHg). ASSESSMENT/PLAN:      #Acute on chronic respiratory failure with hypoxia  #Hx cryptogenic organizing pneumonia  # ILD with  acute exacerbation  -baseline 8L w/ ambulation and 6L at rest  -was requiring 10L continuously w/ desats on ambulation and with associated dyspnea   -procal negative  -CXR non-acute  -IV solumedrol started   -sukumar duonebs and prn albuterol  -azithromycin added by pulm  - resume pirfenidone   -respiratory culture remain neg   - improved and now back to home level of 6L   - dc home on prednisone taper     #Sinus tachycardia  -likely 2/2 above  -d-dimer negative  -monitor on tele  - resumed home nadolol and cardizem improved       #PAF  -on diltiazem and nadolol  -not on AC, per daughter due to recurrent epistaxis and chronic anemia   -currently has heart monitor for work-up for watchman implant     #HypoMg  #HypoK  - sec to chronic diarrhea with ILD meds    -replaced as needed        #Diastolic CHF  -without apparent exacerbation  -last echo as above   -monitor I&Os and daily weights  - does not take lasix any more      #Chronic anemia  - hb stable at 10  -stable; monitored CBC     #Type II DM  -reports no longer requiring meds 2/2 weight loss  -monitor POC glucose, SSI prn     #Hypothyroidism  -continue synthroid     #HLD  -continue statin     #Anxiety  -continue daily sertraline and prn ativan           DVT Prophylaxis: Lovenox  Diet: ADULT DIET;  Regular  Code Status: Full Code     Dc to home ok today     Shelby Faust MD, 2/22/2023 7:55 AM

## 2023-02-22 NOTE — PROGRESS NOTES
Patient called out about a nose bleed. Patient assessed and nose bleed under control. Patient is slightly anxious and worried her O2 would drop but was reassured that the bleed was under control and her O2 was 96%.

## 2023-02-22 NOTE — FLOWSHEET NOTE
02/21/23 1916   Vital Signs   Temp 98.1 °F (36.7 °C)   Temp Source Axillary   Heart Rate 87   Heart Rate Source Monitor   Resp 18   BP (!) 141/81   MAP (Calculated) 101   BP Location Left upper arm   BP Method Automatic   Patient Position Sitting   Level of Consciousness 0   MEWS Score 1   Oxygen Therapy   SpO2 99 %   O2 Device High flow nasal cannula   O2 Flow Rate (L/min) 10 L/min     Shift assessment completed see flow sheet. Patient in bed alert and oriented X4. Patient on 10L O2, showing no signs of distress. Evening medications given per order, patient was able to swallow pills whole with he sprite. No has no other needs at this time. Call light in reach.

## 2023-02-22 NOTE — PROGRESS NOTES
[facility] Pulmonary, Critical Care and Sleep Specialists                                 Pulmonary Consult /Progress Note :                                                                  CHIEF COMPLAINT: SOB  Reason ILD with acute exacerbation. Hypoxia acute on chronic. Consultant physician :Ena Najjar, MD    HPI:   Doing better   Down to 6 L   Still with LEE  Still feel weak   No fever  Objective:   PHYSICAL EXAM:    Blood pressure (!) 150/88, pulse (!) 108, temperature 97.5 °F (36.4 °C), temperature source Oral, resp. rate 20, height 5' 2\" (1.575 m), weight 108 lb 14.4 oz (49.4 kg), SpO2 100 %, not currently breastfeeding.' on RA  Gen: No distress. Eyes: PERRL. No sclera icterus. No conjunctival injection. ENT: No discharge. Pharynx clear. Neck: Trachea midline. No obvious mass. Resp: Rhonchi bilaterally no clear Rales   CV: Regular rate. Regular rhythm. No murmur or rub. No edema. GI: Non-tender. Non-distended. No hernia. Skin: Warm and dry. No nodule on exposed extremities. Lymph: No cervical LAD. No supraclavicular LAD. M/S: No cyanosis. No joint deformity. No clubbing. Neuro: Awake. Alert. Moves all four extremities. Psych: Oriented x 3. No anxiety.            DATA reviewed by me:   CXR  CT       LABS/IMAGING:    CBC:  Lab Results   Component Value Date    WBC 7.0 02/22/2023    HGB 10.0 (L) 02/22/2023    HCT 32.4 (L) 02/22/2023    MCV 82.1 02/22/2023     02/22/2023    LYMPHOPCT 6.9 02/22/2023    RBC 3.95 (L) 02/22/2023    MCH 25.4 (L) 02/22/2023    MCHC 30.9 (L) 02/22/2023    RDW 15.6 (H) 02/22/2023    NEUTOPHILPCT 91.2 02/22/2023    MONOPCT 1.5 02/22/2023    BASOPCT 0.3 02/22/2023    NEUTROABS 6.4 02/22/2023    LYMPHSABS 0.5 (L) 02/22/2023    MONOSABS 0.1 02/22/2023    EOSABS 0.0 02/22/2023    BASOSABS 0.0 02/22/2023       Recent Labs     02/22/23  0548 02/21/23  0529 02/20/23  0645   WBC 7.0 10.9 7.6   HGB 10.0* 8.8* 10.3*   HCT 32.4* 27.4* 32.3*   MCV 82.1 80.9 79.7*    154 200         BMP:   Recent Labs     02/20/23  0645 02/21/23  0529 02/22/23  0548    143 139   K 5.1 4.1 3.3*    106 100   CO2 29 31 32   BUN 9 10 8   CREATININE <0.5* <0.5* <0.5*           MG:   Lab Results   Component Value Date/Time    MG 1.90 02/22/2023 05:48 AM     Ca/Phos:   Lab Results   Component Value Date    CALCIUM 8.7 02/22/2023    PHOS 4.6 12/08/2022     LIVER PROFILE:   Recent Labs     02/19/23  2122 02/20/23  0645   AST 27 32   ALT 20 20   BILITOT 0.3 0.4   ALKPHOS 118 128           PT/INR: No results for input(s): PROTIME, INR in the last 72 hours. APTT: No results for input(s): APTT in the last 72 hours. Cardiac Enzymes:  Lab Results   Component Value Date    CKTOTAL 23 (L) 07/10/2019    TROPONINI <0.01 02/19/2023       Assessment:    Acute flareup of ILD  -Based on the biopsies, seems h/o of cryptogenic organizing pneumonia and possibly coexisting NSIP or chronic HP.    -Acute exacerbation of ILD   -History of COPD  -Acute on chronic chronic respiratory failure-      Plan:    Acute flareup with ILD we will increase prednisone to Solu-Medrol 40 mg every 6 hours and wean gradually over 10 days   *- On pirfenidone   *s/p Pneumothostat with recurrent pneumo,CTS felt no pleurodeisis needed   *- on albuterol as needed  *- will see in 3 months   She was asked to go ER with any worsen SOB or P  If not better then we will consider bronchoscopy and rule out atypical infection    Pending PCP PCR from sputum  Follow Resp culture        Thank you very much for allowing me to participate in the care of this pleasant patient , should you have any questions ,please do not hesitate to contact me      Nigel Davis MD,[facility]  Pulmonary&Critical Care Medicine    [facility]    NOTE: This report was transcribed using voice recognition software. Every effort was made to ensure accuracy; however, inadvertent computerized transcription errors may be present.

## 2023-02-22 NOTE — PROGRESS NOTES
RT Inhaler-Nebulizer Bronchodilator Protocol Note    There is a bronchodilator order in the chart from a provider indicating to follow the RT Bronchodilator Protocol and there is an “Initiate RT Inhaler-Nebulizer Bronchodilator Protocol” order as well (see protocol at bottom of note).    CXR Findings:  No results found.    The findings from the last RT Protocol Assessment were as follows:   History Pulmonary Disease: Chronic pulmonary disease  Respiratory Pattern: Regular pattern and RR 12-20 bpm  Breath Sounds: Slightly diminished and/or crackles  Cough: Weak, productive  Indication for Bronchodilator Therapy: Decreased or absent breath sounds  Bronchodilator Assessment Score: 6    Aerosolized bronchodilator medication orders have been revised according to the RT Inhaler-Nebulizer Bronchodilator Protocol below.    Respiratory Therapist to perform RT Therapy Protocol Assessment initially then follow the protocol.  Repeat RT Therapy Protocol Assessment PRN for score 0-3 or on second treatment, BID, and PRN for scores above 3.    No Indications - adjust the frequency to every 6 hours PRN wheezing or bronchospasm, if no treatments needed after 48 hours then discontinue using Per Protocol order mode.     If indication present, adjust the RT bronchodilator orders based on the Bronchodilator Assessment Score as indicated below.  Use Inhaler orders unless patient has one or more of the following: on home nebulizer, not able to hold breath for 10 seconds, is not alert and oriented, cannot activate and use MDI correctly, or respiratory rate 25 breaths per minute or more, then use the equivalent nebulizer order(s) with same Frequency and PRN reasons based on the score.  If a patient is on this medication at home then do not decrease Frequency below that used at home.    0-3 - enter or revise RT bronchodilator order(s) to equivalent RT Bronchodilator order with Frequency of every 4 hours PRN for wheezing or increased work of  breathing using Per Protocol order mode. 4-6 - enter or revise RT Bronchodilator order(s) to two equivalent RT bronchodilator orders with one order with BID Frequency and one order with Frequency of every 4 hours PRN wheezing or increased work of breathing using Per Protocol order mode. 7-10 - enter or revise RT Bronchodilator order(s) to two equivalent RT bronchodilator orders with one order with TID Frequency and one order with Frequency of every 4 hours PRN wheezing or increased work of breathing using Per Protocol order mode. 11-13 - enter or revise RT Bronchodilator order(s) to one equivalent RT bronchodilator order with QID Frequency and an Albuterol order with Frequency of every 4 hours PRN wheezing or increased work of breathing using Per Protocol order mode. Greater than 13 - enter or revise RT Bronchodilator order(s) to one equivalent RT bronchodilator order with every 4 hours Frequency and an Albuterol order with Frequency of every 2 hours PRN wheezing or increased work of breathing using Per Protocol order mode.        Electronically signed by Melodie Gay RCP on 2/22/2023 at 7:51 AM

## 2023-02-22 NOTE — CARE COORDINATION
CM delivered 2nd IMM and provided verbal explanation at bedside. Pt voiced understanding of discharge MCR rights and is agreeable to discharge within 4 hours of delivery of the IMM notice.

## 2023-02-22 NOTE — PROGRESS NOTES
HEART FAILURE CARE PLAN:    Comorbidities Reviewed: Yes   Patient has a past medical history of A-fib ([facility] [address] Anxiety, Cryptogenic organizing pneumonia ([facility] [address] Depression, GERD (gastroesophageal reflux disease), Hyperlipidemia, On home oxygen therapy, Rash, and Thyroid disease. ECHOCARDIOGRAM Reviewed: Yes   Patient's Ejection Fraction (EF) is greater than 40% on 5/9/2022    Weights Reviewed: Yes   Admission weight: 110 lb (49.9 kg)   Wt Readings from Last 3 Encounters:   02/21/23 108 lb 14.4 oz (49.4 kg)   02/07/23 110 lb (49.9 kg)   01/18/23 108 lb 4.8 oz (49.1 kg)     Intake & Output Reviewed: Yes     Intake/Output Summary (Last 24 hours) at 2/22/2023 1304  Last data filed at 2/22/2023 1254  Gross per 24 hour   Intake 560 ml   Output 500 ml   Net 60 ml     Medications Reviewed: Yes   SCHEDULED HOSPITAL MEDICATIONS:   methylPREDNISolone  40 mg IntraVENous 4 times per day    Pirfenidone  534 mg Oral BID    atorvastatin  40 mg Oral Daily    dilTIAZem  240 mg Oral Daily    levothyroxine  125 mcg Oral Daily    mirtazapine  30 mg Oral Nightly    nadolol  20 mg Oral Daily    pantoprazole  40 mg Oral QAM AC    sertraline  200 mg Oral Daily    sodium chloride flush  10 mL IntraVENous 2 times per day    enoxaparin  30 mg SubCUTAneous Daily    azithromycin  500 mg IntraVENous Q24H    ipratropium-albuterol  1 ampule Inhalation 4x daily    insulin lispro  0-4 Units SubCUTAneous TID WC    insulin lispro  0-4 Units SubCUTAneous Nightly     ACE/ARB/ARNI is REQUIRED for EF </= 27% SYSTOLIC FAILURE:   ACE[de-identified] None  ARB[de-identified] None  ARNI[de-identified] None    Evidenced-Based Beta Blocker is REQUIRED for EF </= 17% SYSTOLIC FAILURE:   [de-identified] None    Diuretics:  [de-identified] Furosemide prn for swelling    Diet Reviewed: Yes   ADULT DIET;  Regular    Goal of Care Reviewed: Yes   Patient and/or Family's stated Goal of Care this Admission: reduce shortness of breath, increase activity tolerance, better understand heart failure and disease management, and be more comfortable prior to discharge.

## 2023-02-22 NOTE — CARE COORDINATION
DISCHARGE ORDER  Date/Time 2023 10:53 AM  Completed by: Susan Marie RN, Case Management    Patient Name: Claire Obando      : 1955  Admitting Diagnosis: Hypokalemia [E87.6]  Hypomagnesemia [E83.42]  History of COPD [Z87.09]  Acute on chronic respiratory failure with hypoxia ([facility] [address] [J96.21]      Admit order Date and Status: IP 2023  (verify MD's last order for status of admission)      Noted discharge order. If applicable PT/OT recommendation at Discharge: NA  DME recommendation by PT/OT:NA  Confirmed discharge plan with patient  Discharge Plan: Chart reviewed. Met with pt at bedside and explained the role of the CM. Plans to Dc homne today with resumption of HHC services with [facility]. Orders and CAROLANN/AVS in Epic. Notification christophe to Brice Black ([facility] intake) and she will pull orders and clinicals from [facility] for [facility]. Family will provide transport home today and will bring her home O2 tank for transport. Date of Last IMM Given: 2023       Has Home O2 in place on admit:  Yes  Informed of need to bring portable home O2 tank on day of discharge for nursing to connect prior to leaving:   Yes  Verbalized agreement/Understanding:   Yes  Pt is being d/c'd to home today. Pt's O2 sats are 94% on 6 liters (baseline 6-8 liters) has 10 liter concentrator at home. Discharge timeout done with Chelsie Lehman RN. All discharge needs and concerns addressed.

## 2023-02-22 NOTE — PROGRESS NOTES
DC instructions have been reviewed with a verbal understanding from patient and her spouse. IV removed with no complications. Patient dressed and denied any needs prior to leaving the facility. Patient did receive medications from Outpatient pharmacy. Patient spouse did bring her oxygen from home.

## 2023-02-23 ENCOUNTER — CARE COORDINATION (OUTPATIENT)
Dept: CASE MANAGEMENT | Age: 68
End: 2023-02-23

## 2023-02-23 DIAGNOSIS — J96.01 ACUTE HYPOXEMIC RESPIRATORY FAILURE (HCC): Primary | ICD-10-CM

## 2023-02-23 PROCEDURE — 1111F DSCHRG MED/CURRENT MED MERGE: CPT | Performed by: FAMILY MEDICINE

## 2023-02-23 NOTE — CARE COORDINATION
[facility] Care Transitions Initial Follow Up Call    Call within 2 business days of discharge: Yes    Patient Current Location: Home: [address] [place] [address]    Care Transition Nurse contacted the caregiver by telephone to perform post hospital discharge assessment. Provided introduction to self, and explanation of the Care Transition Nurse role. Patient: Abrahma Obando Patient : 1955   MRN: [id number]  Reason for Admission: acute on chronic resp failure with hypoxia, hx cryptogenic organizing pna, ILD with acute exac, chronic diarrhea from ILD meds, sinus tachycardia, PAF, hypoMg, hypoK, dCHF no AE, chronic anemia, DM2, hypothyroidism, HLD, anxiety -> home with [facility] YAIMA, baseline O2 4-8lpm (has 10L concentrator), [facility] (dtr MWF evenings), Holter monitor from OV with cardiology ?, working up for [employer] outpt  Discharge Date: 23 RARS: Readmission Risk Score: 39.4      Last Discharge [address]       Date Complaint Diagnosis Description Type Department Provider    23 Shortness of Breath Acute on chronic respiratory failure with hypoxia ([facility] [address] . .. ED to Hosp-Admission (Discharged) (ADMITTED) Mireille No MD; Gaye Quintero. .. Challenges to be reviewed by the provider   Additional needs identified to be addressed with provider: No         Method of communication with provider: none. CTN spoke with patient's daughter/caregiver Onur Marcos (ok per HIPAA). Wearing monitor from cardiology visit . It is 30-day monitor. Has appt with Dr Mark White tomorrow to ifrah for Watchman. Daughter going to see if she can make it virtual - has office number. Drinks a lot of fluid. Adds salt to all foods. Does not weigh daily. Takes furosemide prn for LE edema. Currently at 6lpm O2. Monitor with home pulse ox. HC to resume this afternoon. Daughter instructed to contact Dr Inessa Rodriguez office to ask if they want to reschedule PFT because of admission.  We discussed fluid and salt restrictions, CHF monitoring. Offered information on [facility] and [facility] for prn concerns. Daughter denies further needs from CTN at this time. She has CTN contact info for future needs. Care Transition Nurse reviewed discharge instructions, medical action plan, and red flags with patient who verbalized understanding. The patient was given an opportunity to ask questions and does not have any further questions or concerns at this time. Were discharge instructions available to patient? Yes. Reviewed appropriate site of care based on symptoms and resources available to patient including: PCP  Specialist  Home health. The patient agrees to contact the PCP office for questions related to their healthcare. Advance Care Planning:   Does patient have an Advance Directive: reviewed and current. Medication reconciliation was performed with caregiver, who verbalizes understanding of administration of home medications.  Medications reviewed, 1111F entered: yes    Was patient discharged with a pulse oximeter? no    Non-face-to-face services provided:  Obtained and reviewed discharge summary and/or continuity of care documents  Education of patient/family/caregiver/guardian to support self-management-CHF ed  Assessment and support for treatment adherence and medication management-1111F done    Offered patient enrollment in the Remote Patient Monitoring (RPM) program for in-home monitoring: Patient is not eligible for RPM program.    Care Transitions 24 Hour Call    Schedule Follow Up Appointment with PCP: Completed  Do you have a copy of your discharge instructions?: Yes  Do you have all of your prescriptions and are they filled?: Yes  Have you been contacted by a [facility] Pharmacist?: No  Have you scheduled your follow up appointment?: Yes  How are you going to get to your appointment?: Car - family or friend to transport  [address]: [address]  Patient DME: Trish Gosselin chair  Patient Home Equipment: Nebulizer, Oxygen  Do you have support at home?: Partner/Spouse/SO  Do you feel like you have everything you need to keep you well at home?: Yes  Are you an active caregiver in your home?: No  Care Transitions Interventions       Follow Up  Future Appointments   Date Time Provider Calvin Mcmullen   2/24/2023 10:15 AM MD Jackeline Brown [facility]   3/15/2023 10:00 AM [facility] PULMONARY FUNCTION TESTING [facility] PFT [facility]   3/15/2023 11:15 AM [facility] Heath, [facility]   5/17/2023 10:00 AM Alka Garcia APRN - CNP Jackeline Rm [facility]       Nika Dunn, RN  Care Transition Nurse  [phone] mobile

## 2023-02-24 ENCOUNTER — TELEPHONE (OUTPATIENT)
Dept: FAMILY MEDICINE CLINIC | Age: 68
End: 2023-02-24

## 2023-02-24 NOTE — TELEPHONE ENCOUNTER
Care Transitions Initial Follow Up Call    Outreach made within 2 business days of discharge: Yes    Patient: Franklyn Bailey Patient : 1955   MRN: [id number]  Reason for Admission: There are no discharge diagnoses documented for the most recent discharge. Discharge Date: 23       Spoke with: Matilda English     Discharge department/facility: [facility]     TCM Interactive Patient Contact:  Was patient able to fill all prescriptions: Yes  Was patient instructed to bring all medications to the follow-up visit: No: not that daughter knows of. Is patient taking all medications as directed in the discharge summary?  Yes  Does patient understand their discharge instructions: Yes  Does patient have questions or concerns that need addressed prior to 7-14 day follow up office visit: no    Scheduled appointment with PCP within 7-14 days    Follow Up  Future Appointments   Date Time Provider Calvin Mcmullen   3/15/2023 10:00 AM [address] [place] [address] PFT Ondina NOGUERA   3/15/2023 11:15 AM MD DURAN Garzon   2023 10:00 AM Cheryle Mattock, APRN - GISELLA Cummins [place]

## 2023-02-28 ENCOUNTER — TELEMEDICINE (OUTPATIENT)
Dept: FAMILY MEDICINE CLINIC | Age: 68
End: 2023-02-28

## 2023-02-28 DIAGNOSIS — I50.32 CHRONIC DIASTOLIC CONGESTIVE HEART FAILURE (HCC): ICD-10-CM

## 2023-02-28 DIAGNOSIS — F41.9 ANXIETY: ICD-10-CM

## 2023-02-28 DIAGNOSIS — E87.6 HYPOKALEMIA: ICD-10-CM

## 2023-02-28 DIAGNOSIS — Z09 HOSPITAL DISCHARGE FOLLOW-UP: ICD-10-CM

## 2023-02-28 DIAGNOSIS — J96.21 ACUTE ON CHRONIC RESPIRATORY FAILURE WITH HYPOXIA (HCC): Primary | ICD-10-CM

## 2023-02-28 DIAGNOSIS — I47.1 SVT (SUPRAVENTRICULAR TACHYCARDIA) (HCC): ICD-10-CM

## 2023-02-28 DIAGNOSIS — M54.16 LUMBAR RADICULOPATHY: ICD-10-CM

## 2023-02-28 DIAGNOSIS — E78.5 HYPERLIPIDEMIA WITH TARGET LDL LESS THAN 130: ICD-10-CM

## 2023-02-28 DIAGNOSIS — E11.9 TYPE 2 DIABETES MELLITUS WITHOUT COMPLICATION, WITHOUT LONG-TERM CURRENT USE OF INSULIN (HCC): ICD-10-CM

## 2023-02-28 DIAGNOSIS — J84.9 ILD (INTERSTITIAL LUNG DISEASE) (HCC): ICD-10-CM

## 2023-02-28 DIAGNOSIS — E83.42 HYPOMAGNESEMIA: ICD-10-CM

## 2023-02-28 RX ORDER — TRAMADOL HYDROCHLORIDE 50 MG/1
50-100 TABLET ORAL EVERY 6 HOURS PRN
Qty: 240 TABLET | Refills: 5 | Status: SHIPPED | OUTPATIENT
Start: 2023-02-28 | End: 2023-08-27

## 2023-02-28 ASSESSMENT — PATIENT HEALTH QUESTIONNAIRE - PHQ9
8. MOVING OR SPEAKING SO SLOWLY THAT OTHER PEOPLE COULD HAVE NOTICED. OR THE OPPOSITE, BEING SO FIGETY OR RESTLESS THAT YOU HAVE BEEN MOVING AROUND A LOT MORE THAN USUAL: 0
SUM OF ALL RESPONSES TO PHQ QUESTIONS 1-9: 0
4. FEELING TIRED OR HAVING LITTLE ENERGY: 0
9. THOUGHTS THAT YOU WOULD BE BETTER OFF DEAD, OR OF HURTING YOURSELF: 0
7. TROUBLE CONCENTRATING ON THINGS, SUCH AS READING THE NEWSPAPER OR WATCHING TELEVISION: 0
10. IF YOU CHECKED OFF ANY PROBLEMS, HOW DIFFICULT HAVE THESE PROBLEMS MADE IT FOR YOU TO DO YOUR WORK, TAKE CARE OF THINGS AT HOME, OR GET ALONG WITH OTHER PEOPLE: 0
SUM OF ALL RESPONSES TO PHQ QUESTIONS 1-9: 0
1. LITTLE INTEREST OR PLEASURE IN DOING THINGS: 0
3. TROUBLE FALLING OR STAYING ASLEEP: 0
SUM OF ALL RESPONSES TO PHQ QUESTIONS 1-9: 0
6. FEELING BAD ABOUT YOURSELF - OR THAT YOU ARE A FAILURE OR HAVE LET YOURSELF OR YOUR FAMILY DOWN: 0
2. FEELING DOWN, DEPRESSED OR HOPELESS: 0
SUM OF ALL RESPONSES TO PHQ9 QUESTIONS 1 & 2: 0
5. POOR APPETITE OR OVEREATING: 0
SUM OF ALL RESPONSES TO PHQ QUESTIONS 1-9: 0

## 2023-02-28 NOTE — PROGRESS NOTES
Post-Discharge Transitional Care  Follow Up      Armin Obando   YOB: 1955    Date of Office Visit:  2/28/2023  Date of Hospital Admission: 2/19/23  Date of Hospital Discharge: 2/22/23  Risk of hospital readmission (high >=14%. Medium >=10%) :Readmission Risk Score: 39.4      Care management risk score Rising risk (score 2-5) and Complex Care (Scores >=6): No Risk Score On File     Non face to face  following discharge, date last encounter closed (first attempt may have been earlier): 02/24/2023    Call initiated 2 business days of discharge: Yes    ASSESSMENT/PLAN:   Below is the assessment and plan developed based on review of pertinent history, physical exam, labs, studies, and medications. Acute on chronic respiratory failure with hypoxia (HCC)  Back to baseline  Lumbar radiculopathy  Controlled  -     traMADol (ULTRAM) 50 MG tablet; Take 1-2 tablets by mouth every 6 hours as needed for Pain for up to 180 days. Take lowest dose possible to manage pain, Disp-240 tablet, R-5Normal  ILD (interstitial lung disease) ([facility] [address]  Gradually worsening  SVT (supraventricular tachycardia) (HCC)  Resolved  Chronic diastolic congestive heart failure (HCC)  Stable, no changes  Hypomagnesemia  Resolved  Hypokalemia  Resolved  Hyperlipidemia with target LDL less than 130  Controlled  Anxiety  Controlled  Type 2 diabetes mellitus without complication, without long-term current use of insulin [facility])  Hospital discharge follow-up  -     ND DISCHARGE MEDS RECONCILED W/ CURRENT OUTPATIENT MED LIST    Medical Decision Making: high complexity  Return if symptoms worsen or fail to improve.            Subjective:   HPI:  Follow up of Hospital problems/diagnosis(es): Acute on chronic respiratory failure with hypoxia, history of cryptogenic organizing pneumonia, interstitial lung disease with acute about exacerbation, sinus tachycardia, paroxysmal atrial fibrillation, hypomagnesemia, hypokalemia, diastolic congestive heart failure, chronic anemia, type 2 diabetes, hypothyroidism, hyperlipidemia, anxiety    Inpatient course: Discharge summary reviewed- see chart. Interval history/Current status: currently on 5-6 lpm NC at rest, 8 LPM when active. (back to baseline) Scheduled with cardiology to discuss Watchman device placement for her a-fib. Unable to tolerate anticoagulants due to frequent nosebleeds. Stopped taking trazodone. Doesn't want to take it anymore. Sleep ok. Patient with chronic lumbar radiculopathy for which she takes tramadol as needed which works well. When she was hospitalized in January someone there to discontinue the medication and she has no refills now. She would like a refill.     Patient Active Problem List   Diagnosis    Chest pain    HTN (hypertension)    Hyperlipidemia with target LDL less than 130    Hypokalemia    Insomnia    Trochanteric bursitis of both hips    Migraine    Syncope    Acute blood loss anemia    Fatigue    Hypothyroidism    Mild single current episode of major depressive disorder (HCC)    Anxiety    Pneumonia of both lower lobes due to infectious organism    A-fib ([address]    Atypical pneumonia    Acute bronchitis with bronchospasm    Acute on chronic diastolic CHF (congestive heart failure) (HCC)    Class 2 obesity with body mass index (BMI) of 39.0 to 39.9 in adult    Abnormal chest CT    Acute diastolic heart failure (HCC)    ILD (interstitial lung disease) (HCC)    Acute on chronic respiratory failure with hypoxia (HCC)    Restrictive lung disease    Abnormal CT of the chest    SOB (shortness of breath)    Acute cystitis without hematuria    Dyspnea and respiratory abnormalities    Acute on chronic respiratory failure with hypoxemia (HCC)    Cryptogenic organizing pneumonia ([address]    Pneumothorax of left lung after biopsy    COPD (chronic obstructive pulmonary disease) ([address]    Type 2 diabetes mellitus without complication (HCC)    Hyponatremia    Numbness and tingling in left hand    Ulnar neuropathy at elbow of left upper extremity    Acute congestive heart failure (HCC)    Left wrist pain    Left wrist tendinitis    GERD (gastroesophageal reflux disease)    Toxic metabolic encephalopathy    VIRGINIE (acute kidney injury) ([facility] [address]    Lumbar radiculopathy    Acute respiratory failure with hypoxia and hypercapnia ([facility])    Rib pain on left side    Elevated brain natriuretic peptide (BNP) level    Fracture of multiple ribs of left side    Interstitial lung disease ([facility])    Hypomagnesemia    Depression    Chronic diastolic congestive heart failure ([facility])    Burst fracture of lumbar vertebra ([facility])    H/O Spinal surgery    Severe malnutrition ([facility] [address]    Pneumonia due to COVID-19 virus    Primary spontaneous pneumothorax    Secondary spontaneous pneumothorax    COVID-19    Acute on chronic respiratory failure ([facility])    Diarrhea    Pulmonary fibrosis ([facility])    PSVT (paroxysmal supraventricular tachycardia) ([facility])    PAF (paroxysmal atrial fibrillation) ([facility])    SVT (supraventricular tachycardia) ([facility])    Pneumothorax    Tension pneumothorax    Dependence on supplemental oxygen    Atrial fibrillation with RVR ([facility])    Chronic hypoxemic respiratory failure ([facility])    COPD exacerbation ([facility] [address]    Acute hypoxemic respiratory failure ([facility])    Epistaxis    History of COPD       Medications listed as ordered at the time of discharge from hospital     Medication List            Accurate as of February 28, 2023  9:41 AM. If you have any questions, ask your nurse or doctor.                 CONTINUE taking these medications      * albuterol (2.5 MG/3ML) 0.083% nebulizer solution  Commonly known as: PROVENTIL  INHALE THE CONTENTS OF 1 VIAL VIA NEBULIZER EVERY 6 HOURS AS NEEDED FOR WHEEZING     * albuterol sulfate  (90 Base) MCG/ACT inhaler  Commonly known as: PROVENTIL;VENTOLIN;PROAIR  INHALE 2 PUFFS INTO THE LUNGS EVERY 6 HOURS AS NEEDED FOR WHEEZING     atorvastatin 40 MG tablet  Commonly known as: LIPITOR  TAKE 1 TABLET EVERY DAY     benzonatate 200 MG capsule  Commonly known as: TESSALON  TAKE 1 CAPSULE BY MOUTH 3 TIMES A DAY AS NEEDED FOR COUGH     dilTIAZem 240 MG extended release capsule  Commonly known as: CARDIZEM CD  Take 1 capsule by mouth daily     furosemide 40 MG tablet  Commonly known as: LASIX  Take 0.5 tablets by mouth See Admin Instructions Daily prn for edema     guaiFENesin 600 MG extended release tablet  Commonly known as: MUCINEX     hydrOXYzine HCl 10 MG tablet  Commonly known as: ATARAX     levothyroxine 125 MCG tablet  Commonly known as: SYNTHROID  Take 1 tablet by mouth Daily TAKE 1 TABLET BY MOUTH EVERY DAY     LORazepam 0.5 MG tablet  Commonly known as: ATIVAN     mirtazapine 30 MG tablet  Commonly known as: REMERON  TAKE 1 TABLET EVERY NIGHT     nadolol 20 MG tablet  Commonly known as: CORGARD  Take 1 tablet by mouth daily     omeprazole 40 MG delayed release capsule  Commonly known as: PRILOSEC  Take 1 capsule by mouth every morning (before breakfast)     ondansetron 4 MG tablet  Commonly known as: ZOFRAN  Take 1 tablet by mouth 3 times daily as needed for Nausea or Vomiting     Pirfenidone 267 MG Tabs  pirfenidone daily for a week and then BID for a week and then back to TID.     potassium chloride 20 MEQ extended release tablet  Commonly known as: KLOR-CON M  Take 1 tablet by mouth daily     predniSONE 10 MG tablet  Commonly known as: DELTASONE  30 mg x 3 days, 20 mg x 3 days, 10 mg x 3 days then stop     sertraline 100 MG tablet  Commonly known as: ZOLOFT  TAKE 2 TABLETS EVERY DAY     sodium chloride 0.65 % nasal spray  Commonly known as: OCEAN  1 spray by Nasal route in the morning and at bedtime     traMADol 50 MG tablet  Commonly known as: ULTRAM     WOMENS ONE DAILY PO           * This list has 2 medication(s) that are the same as other medications prescribed for you. Read the directions carefully, and ask your doctor or other care provider to review them with you. Medications marked \"taking\" at this time  Outpatient Medications Marked as Taking for the 2/28/23 encounter (Telemedicine) with Maria Alejandra Cook MD   Medication Sig Dispense Refill    traMADol (ULTRAM) 50 MG tablet Take 1-2 tablets by mouth every 6 hours as needed for Pain for up to 180 days. Take lowest dose possible to manage pain 240 tablet 5    predniSONE (DELTASONE) 10 MG tablet 30 mg x 3 days, 20 mg x 3 days, 10 mg x 3 days then stop 18 tablet 0    potassium chloride (KLOR-CON M) 20 MEQ extended release tablet Take 1 tablet by mouth daily 30 tablet 1    sodium chloride (OCEAN) 0.65 % nasal spray 1 spray by Nasal route in the morning and at bedtime 1 each 3        Medications patient taking as of now reconciled against medications ordered at time of hospital discharge: Yes    A comprehensive review of systems was negative except for what was noted in the HPI. Objective:    Patient-Reported Vitals  No data recorded        Armin Obando, was evaluated through a synchronous (real-time) audio-video encounter. The patient (or guardian if applicable) is aware that this is a billable service, which includes applicable co-pays. This Virtual Visit was conducted with patient's (and/or legal guardian's) consent. The visit was conducted pursuant to the emergency declaration under the [facility], [address] [place] [address] authority and the [employer] and [employer] Act. Patient identification was verified, and a caregiver was present when appropriate. The patient was located at Home: [address] [place] [address]  Provider was located at [facility] ([address] [place] [address]): [address] [place] [address]       An electronic signature was used to authenticate this note.   --Alessio Cuello MD

## 2023-03-02 ENCOUNTER — HOSPITAL ENCOUNTER (INPATIENT)
Age: 68
LOS: 3 days | Discharge: HOSPICE | DRG: 196 | End: 2023-03-05
Attending: EMERGENCY MEDICINE | Admitting: INTERNAL MEDICINE
Payer: MEDICARE

## 2023-03-02 ENCOUNTER — APPOINTMENT (OUTPATIENT)
Dept: CT IMAGING | Age: 68
DRG: 196 | End: 2023-03-02
Payer: MEDICARE

## 2023-03-02 ENCOUNTER — APPOINTMENT (OUTPATIENT)
Dept: GENERAL RADIOLOGY | Age: 68
DRG: 196 | End: 2023-03-02
Payer: MEDICARE

## 2023-03-02 DIAGNOSIS — J96.21 ACUTE ON CHRONIC RESPIRATORY FAILURE WITH HYPOXIA AND HYPERCAPNIA (HCC): Primary | ICD-10-CM

## 2023-03-02 DIAGNOSIS — J96.22 ACUTE ON CHRONIC RESPIRATORY FAILURE WITH HYPOXIA AND HYPERCAPNIA (HCC): Primary | ICD-10-CM

## 2023-03-02 LAB
A/G RATIO: 1.2 (ref 1.1–2.2)
ALBUMIN SERPL-MCNC: 3.9 G/DL (ref 3.4–5)
ALP BLD-CCNC: 93 U/L (ref 40–129)
ALT SERPL-CCNC: 28 U/L (ref 10–40)
ANION GAP SERPL CALCULATED.3IONS-SCNC: 7 MMOL/L (ref 3–16)
AST SERPL-CCNC: 38 U/L (ref 15–37)
BASE EXCESS VENOUS: 11 MMOL/L (ref -3–3)
BASOPHILS ABSOLUTE: 0 K/UL (ref 0–0.2)
BASOPHILS RELATIVE PERCENT: 0.2 %
BILIRUB SERPL-MCNC: 0.3 MG/DL (ref 0–1)
BUN BLDV-MCNC: 12 MG/DL (ref 7–20)
CALCIUM SERPL-MCNC: 9.4 MG/DL (ref 8.3–10.6)
CARBOXYHEMOGLOBIN: 0.6 % (ref 0–1.5)
CHLORIDE BLD-SCNC: 96 MMOL/L (ref 99–110)
CO2: 38 MMOL/L (ref 21–32)
CREAT SERPL-MCNC: <0.5 MG/DL (ref 0.6–1.2)
EOSINOPHILS ABSOLUTE: 0.1 K/UL (ref 0–0.6)
EOSINOPHILS RELATIVE PERCENT: 1.3 %
GFR SERPL CREATININE-BSD FRML MDRD: >60 ML/MIN/{1.73_M2}
GLUCOSE BLD-MCNC: 115 MG/DL (ref 70–99)
HCO3 VENOUS: 37.7 MMOL/L (ref 23–29)
HCT VFR BLD CALC: 31.3 % (ref 36–48)
HEMOGLOBIN: 9.9 G/DL (ref 12–16)
INR BLD: 1.08 (ref 0.87–1.14)
LYMPHOCYTES ABSOLUTE: 0.9 K/UL (ref 1–5.1)
LYMPHOCYTES RELATIVE PERCENT: 11 %
MCH RBC QN AUTO: 25 PG (ref 26–34)
MCHC RBC AUTO-ENTMCNC: 31.6 G/DL (ref 31–36)
MCV RBC AUTO: 79.2 FL (ref 80–100)
METHEMOGLOBIN VENOUS: 0.3 %
MONOCYTES ABSOLUTE: 0.3 K/UL (ref 0–1.3)
MONOCYTES RELATIVE PERCENT: 3.6 %
NEUTROPHILS ABSOLUTE: 6.8 K/UL (ref 1.7–7.7)
NEUTROPHILS RELATIVE PERCENT: 83.9 %
O2 SAT, VEN: 85 %
O2 THERAPY: ABNORMAL
PCO2, VEN: 62.7 MMHG (ref 40–50)
PDW BLD-RTO: 15.7 % (ref 12.4–15.4)
PH VENOUS: 7.4 (ref 7.35–7.45)
PLATELET # BLD: 168 K/UL (ref 135–450)
PMV BLD AUTO: 7.5 FL (ref 5–10.5)
PO2, VEN: 51.3 MMHG (ref 25–40)
POTASSIUM SERPL-SCNC: 4.1 MMOL/L (ref 3.5–5.1)
PRO-BNP: 179 PG/ML (ref 0–124)
PROTHROMBIN TIME: 13.9 SEC (ref 11.7–14.5)
RAPID INFLUENZA  B AGN: NEGATIVE
RAPID INFLUENZA A AGN: NEGATIVE
RBC # BLD: 3.96 M/UL (ref 4–5.2)
SARS-COV-2, NAAT: NOT DETECTED
SODIUM BLD-SCNC: 141 MMOL/L (ref 136–145)
TCO2 CALC VENOUS: 40 MMOL/L
TOTAL PROTEIN: 7.1 G/DL (ref 6.4–8.2)
TROPONIN: <0.01 NG/ML
WBC # BLD: 8.1 K/UL (ref 4–11)

## 2023-03-02 PROCEDURE — 6370000000 HC RX 637 (ALT 250 FOR IP): Performed by: EMERGENCY MEDICINE

## 2023-03-02 PROCEDURE — 80053 COMPREHEN METABOLIC PANEL: CPT

## 2023-03-02 PROCEDURE — 36415 COLL VENOUS BLD VENIPUNCTURE: CPT

## 2023-03-02 PROCEDURE — 85610 PROTHROMBIN TIME: CPT

## 2023-03-02 PROCEDURE — 71260 CT THORAX DX C+: CPT | Performed by: EMERGENCY MEDICINE

## 2023-03-02 PROCEDURE — 96374 THER/PROPH/DIAG INJ IV PUSH: CPT

## 2023-03-02 PROCEDURE — 87804 INFLUENZA ASSAY W/OPTIC: CPT

## 2023-03-02 PROCEDURE — 85025 COMPLETE CBC W/AUTO DIFF WBC: CPT

## 2023-03-02 PROCEDURE — 83880 ASSAY OF NATRIURETIC PEPTIDE: CPT

## 2023-03-02 PROCEDURE — 6360000004 HC RX CONTRAST MEDICATION: Performed by: EMERGENCY MEDICINE

## 2023-03-02 PROCEDURE — 84145 PROCALCITONIN (PCT): CPT

## 2023-03-02 PROCEDURE — 84484 ASSAY OF TROPONIN QUANT: CPT

## 2023-03-02 PROCEDURE — 71045 X-RAY EXAM CHEST 1 VIEW: CPT

## 2023-03-02 PROCEDURE — 93005 ELECTROCARDIOGRAM TRACING: CPT | Performed by: EMERGENCY MEDICINE

## 2023-03-02 PROCEDURE — 87635 SARS-COV-2 COVID-19 AMP PRB: CPT

## 2023-03-02 PROCEDURE — 6360000002 HC RX W HCPCS: Performed by: EMERGENCY MEDICINE

## 2023-03-02 PROCEDURE — 2060000000 HC ICU INTERMEDIATE R&B

## 2023-03-02 PROCEDURE — 82803 BLOOD GASES ANY COMBINATION: CPT

## 2023-03-02 PROCEDURE — 99285 EMERGENCY DEPT VISIT HI MDM: CPT

## 2023-03-02 RX ORDER — IPRATROPIUM BROMIDE AND ALBUTEROL SULFATE 2.5; .5 MG/3ML; MG/3ML
1 SOLUTION RESPIRATORY (INHALATION) ONCE
Status: COMPLETED | OUTPATIENT
Start: 2023-03-02 | End: 2023-03-02

## 2023-03-02 RX ORDER — METHYLPREDNISOLONE SODIUM SUCCINATE 125 MG/2ML
125 INJECTION, POWDER, LYOPHILIZED, FOR SOLUTION INTRAMUSCULAR; INTRAVENOUS ONCE
Status: COMPLETED | OUTPATIENT
Start: 2023-03-02 | End: 2023-03-02

## 2023-03-02 RX ADMIN — IPRATROPIUM BROMIDE AND ALBUTEROL SULFATE 1 AMPULE: 2.5; .5 SOLUTION RESPIRATORY (INHALATION) at 22:37

## 2023-03-02 RX ADMIN — METHYLPREDNISOLONE SODIUM SUCCINATE 125 MG: 125 INJECTION, POWDER, FOR SOLUTION INTRAMUSCULAR; INTRAVENOUS at 22:37

## 2023-03-02 RX ADMIN — IOPAMIDOL 75 ML: 755 INJECTION, SOLUTION INTRAVENOUS at 21:03

## 2023-03-02 ASSESSMENT — PAIN - FUNCTIONAL ASSESSMENT: PAIN_FUNCTIONAL_ASSESSMENT: NONE - DENIES PAIN

## 2023-03-03 LAB
A/G RATIO: 1.5 (ref 1.1–2.2)
ALBUMIN SERPL-MCNC: 3.8 G/DL (ref 3.4–5)
ALP BLD-CCNC: 91 U/L (ref 40–129)
ALT SERPL-CCNC: 26 U/L (ref 10–40)
ANION GAP SERPL CALCULATED.3IONS-SCNC: 11 MMOL/L (ref 3–16)
AST SERPL-CCNC: 25 U/L (ref 15–37)
BASOPHILS ABSOLUTE: 0 K/UL (ref 0–0.2)
BASOPHILS RELATIVE PERCENT: 0 %
BILIRUB SERPL-MCNC: 0.3 MG/DL (ref 0–1)
BUN BLDV-MCNC: 12 MG/DL (ref 7–20)
CALCIUM SERPL-MCNC: 9 MG/DL (ref 8.3–10.6)
CHLORIDE BLD-SCNC: 99 MMOL/L (ref 99–110)
CO2: 29 MMOL/L (ref 21–32)
CREAT SERPL-MCNC: <0.5 MG/DL (ref 0.6–1.2)
EKG ATRIAL RATE: 92 BPM
EKG DIAGNOSIS: NORMAL
EKG P AXIS: 2 DEGREES
EKG P-R INTERVAL: 148 MS
EKG Q-T INTERVAL: 356 MS
EKG QRS DURATION: 76 MS
EKG QTC CALCULATION (BAZETT): 440 MS
EKG R AXIS: -1 DEGREES
EKG T AXIS: -11 DEGREES
EKG VENTRICULAR RATE: 92 BPM
EOSINOPHILS ABSOLUTE: 0 K/UL (ref 0–0.6)
EOSINOPHILS RELATIVE PERCENT: 0 %
GFR SERPL CREATININE-BSD FRML MDRD: >60 ML/MIN/{1.73_M2}
GLUCOSE BLD-MCNC: 121 MG/DL (ref 70–99)
GLUCOSE BLD-MCNC: 182 MG/DL (ref 70–99)
GLUCOSE BLD-MCNC: 205 MG/DL (ref 70–99)
GLUCOSE BLD-MCNC: 216 MG/DL (ref 70–99)
GLUCOSE BLD-MCNC: 262 MG/DL (ref 70–99)
GLUCOSE BLD-MCNC: 317 MG/DL (ref 70–99)
HCT VFR BLD CALC: 31.1 % (ref 36–48)
HEMOGLOBIN: 10 G/DL (ref 12–16)
LYMPHOCYTES ABSOLUTE: 0.4 K/UL (ref 1–5.1)
LYMPHOCYTES RELATIVE PERCENT: 6.6 %
MCH RBC QN AUTO: 26 PG (ref 26–34)
MCHC RBC AUTO-ENTMCNC: 32.2 G/DL (ref 31–36)
MCV RBC AUTO: 80.7 FL (ref 80–100)
MONOCYTES ABSOLUTE: 0 K/UL (ref 0–1.3)
MONOCYTES RELATIVE PERCENT: 0.7 %
NEUTROPHILS ABSOLUTE: 5.5 K/UL (ref 1.7–7.7)
NEUTROPHILS RELATIVE PERCENT: 92.7 %
PDW BLD-RTO: 15.5 % (ref 12.4–15.4)
PERFORMED ON: ABNORMAL
PLATELET # BLD: 142 K/UL (ref 135–450)
PMV BLD AUTO: 7.7 FL (ref 5–10.5)
POTASSIUM REFLEX MAGNESIUM: 3.9 MMOL/L (ref 3.5–5.1)
PROCALCITONIN: 0.07 NG/ML (ref 0–0.15)
RBC # BLD: 3.85 M/UL (ref 4–5.2)
SODIUM BLD-SCNC: 139 MMOL/L (ref 136–145)
TOTAL PROTEIN: 6.3 G/DL (ref 6.4–8.2)
WBC # BLD: 5.9 K/UL (ref 4–11)

## 2023-03-03 PROCEDURE — 94640 AIRWAY INHALATION TREATMENT: CPT

## 2023-03-03 PROCEDURE — 80053 COMPREHEN METABOLIC PANEL: CPT

## 2023-03-03 PROCEDURE — 94761 N-INVAS EAR/PLS OXIMETRY MLT: CPT

## 2023-03-03 PROCEDURE — 2700000000 HC OXYGEN THERAPY PER DAY

## 2023-03-03 PROCEDURE — 6360000002 HC RX W HCPCS: Performed by: INTERNAL MEDICINE

## 2023-03-03 PROCEDURE — 2060000000 HC ICU INTERMEDIATE R&B

## 2023-03-03 PROCEDURE — 2580000003 HC RX 258: Performed by: INTERNAL MEDICINE

## 2023-03-03 PROCEDURE — 36415 COLL VENOUS BLD VENIPUNCTURE: CPT

## 2023-03-03 PROCEDURE — 85025 COMPLETE CBC W/AUTO DIFF WBC: CPT

## 2023-03-03 PROCEDURE — 99223 1ST HOSP IP/OBS HIGH 75: CPT | Performed by: INTERNAL MEDICINE

## 2023-03-03 PROCEDURE — 6370000000 HC RX 637 (ALT 250 FOR IP): Performed by: INTERNAL MEDICINE

## 2023-03-03 PROCEDURE — 83036 HEMOGLOBIN GLYCOSYLATED A1C: CPT

## 2023-03-03 PROCEDURE — 93010 ELECTROCARDIOGRAM REPORT: CPT | Performed by: INTERNAL MEDICINE

## 2023-03-03 RX ORDER — IPRATROPIUM BROMIDE AND ALBUTEROL SULFATE 2.5; .5 MG/3ML; MG/3ML
1 SOLUTION RESPIRATORY (INHALATION) EVERY 4 HOURS PRN
Status: DISCONTINUED | OUTPATIENT
Start: 2023-03-03 | End: 2023-03-05 | Stop reason: HOSPADM

## 2023-03-03 RX ORDER — INSULIN LISPRO 100 [IU]/ML
0-4 INJECTION, SOLUTION INTRAVENOUS; SUBCUTANEOUS NIGHTLY
Status: DISCONTINUED | OUTPATIENT
Start: 2023-03-03 | End: 2023-03-05 | Stop reason: HOSPADM

## 2023-03-03 RX ORDER — ALBUTEROL SULFATE 2.5 MG/3ML
2.5 SOLUTION RESPIRATORY (INHALATION)
Status: DISCONTINUED | OUTPATIENT
Start: 2023-03-03 | End: 2023-03-05 | Stop reason: HOSPADM

## 2023-03-03 RX ORDER — IPRATROPIUM BROMIDE AND ALBUTEROL SULFATE 2.5; .5 MG/3ML; MG/3ML
1 SOLUTION RESPIRATORY (INHALATION)
Status: DISCONTINUED | OUTPATIENT
Start: 2023-03-03 | End: 2023-03-04

## 2023-03-03 RX ORDER — IPRATROPIUM BROMIDE AND ALBUTEROL SULFATE 2.5; .5 MG/3ML; MG/3ML
1 SOLUTION RESPIRATORY (INHALATION)
Status: DISCONTINUED | OUTPATIENT
Start: 2023-03-03 | End: 2023-03-03

## 2023-03-03 RX ORDER — SODIUM CHLORIDE 0.9 % (FLUSH) 0.9 %
10 SYRINGE (ML) INJECTION EVERY 12 HOURS SCHEDULED
Status: DISCONTINUED | OUTPATIENT
Start: 2023-03-03 | End: 2023-03-05 | Stop reason: HOSPADM

## 2023-03-03 RX ORDER — SODIUM CHLORIDE 9 MG/ML
INJECTION, SOLUTION INTRAVENOUS PRN
Status: DISCONTINUED | OUTPATIENT
Start: 2023-03-03 | End: 2023-03-05 | Stop reason: HOSPADM

## 2023-03-03 RX ORDER — PANTOPRAZOLE SODIUM 40 MG/1
40 TABLET, DELAYED RELEASE ORAL
Status: DISCONTINUED | OUTPATIENT
Start: 2023-03-03 | End: 2023-03-05 | Stop reason: HOSPADM

## 2023-03-03 RX ORDER — SODIUM CHLORIDE 9 MG/ML
INJECTION, SOLUTION INTRAVENOUS CONTINUOUS
Status: DISCONTINUED | OUTPATIENT
Start: 2023-03-03 | End: 2023-03-03

## 2023-03-03 RX ORDER — SODIUM CHLORIDE 0.9 % (FLUSH) 0.9 %
10 SYRINGE (ML) INJECTION PRN
Status: DISCONTINUED | OUTPATIENT
Start: 2023-03-03 | End: 2023-03-05 | Stop reason: HOSPADM

## 2023-03-03 RX ORDER — METHYLPREDNISOLONE SODIUM SUCCINATE 40 MG/ML
40 INJECTION, POWDER, LYOPHILIZED, FOR SOLUTION INTRAMUSCULAR; INTRAVENOUS DAILY
Status: DISCONTINUED | OUTPATIENT
Start: 2023-03-03 | End: 2023-03-04

## 2023-03-03 RX ORDER — TRAMADOL HYDROCHLORIDE 50 MG/1
50 TABLET ORAL EVERY 6 HOURS PRN
Status: DISCONTINUED | OUTPATIENT
Start: 2023-03-03 | End: 2023-03-05 | Stop reason: HOSPADM

## 2023-03-03 RX ORDER — MIRTAZAPINE 30 MG/1
30 TABLET, FILM COATED ORAL NIGHTLY
Status: DISCONTINUED | OUTPATIENT
Start: 2023-03-03 | End: 2023-03-05 | Stop reason: HOSPADM

## 2023-03-03 RX ORDER — SERTRALINE HYDROCHLORIDE 100 MG/1
200 TABLET, FILM COATED ORAL DAILY
Status: DISCONTINUED | OUTPATIENT
Start: 2023-03-03 | End: 2023-03-05 | Stop reason: HOSPADM

## 2023-03-03 RX ORDER — GUAIFENESIN 600 MG/1
600 TABLET, EXTENDED RELEASE ORAL 2 TIMES DAILY PRN
Status: DISCONTINUED | OUTPATIENT
Start: 2023-03-03 | End: 2023-03-05 | Stop reason: HOSPADM

## 2023-03-03 RX ORDER — POTASSIUM CHLORIDE 20 MEQ/1
20 TABLET, EXTENDED RELEASE ORAL
Status: DISCONTINUED | OUTPATIENT
Start: 2023-03-03 | End: 2023-03-05 | Stop reason: HOSPADM

## 2023-03-03 RX ORDER — LORAZEPAM 0.5 MG/1
0.5 TABLET ORAL EVERY 6 HOURS PRN
Status: DISCONTINUED | OUTPATIENT
Start: 2023-03-03 | End: 2023-03-05 | Stop reason: HOSPADM

## 2023-03-03 RX ORDER — ACETAMINOPHEN 325 MG/1
650 TABLET ORAL EVERY 6 HOURS PRN
Status: DISCONTINUED | OUTPATIENT
Start: 2023-03-03 | End: 2023-03-05 | Stop reason: HOSPADM

## 2023-03-03 RX ORDER — ONDANSETRON 2 MG/ML
4 INJECTION INTRAMUSCULAR; INTRAVENOUS EVERY 6 HOURS PRN
Status: DISCONTINUED | OUTPATIENT
Start: 2023-03-03 | End: 2023-03-05 | Stop reason: HOSPADM

## 2023-03-03 RX ORDER — INSULIN LISPRO 100 [IU]/ML
0-6 INJECTION, SOLUTION INTRAVENOUS; SUBCUTANEOUS
Status: DISCONTINUED | OUTPATIENT
Start: 2023-03-03 | End: 2023-03-05 | Stop reason: HOSPADM

## 2023-03-03 RX ORDER — ATORVASTATIN CALCIUM 40 MG/1
40 TABLET, FILM COATED ORAL DAILY
Status: DISCONTINUED | OUTPATIENT
Start: 2023-03-03 | End: 2023-03-05 | Stop reason: HOSPADM

## 2023-03-03 RX ORDER — ENOXAPARIN SODIUM 100 MG/ML
30 INJECTION SUBCUTANEOUS DAILY
Status: DISCONTINUED | OUTPATIENT
Start: 2023-03-03 | End: 2023-03-05 | Stop reason: HOSPADM

## 2023-03-03 RX ORDER — PIRFENIDONE 267 MG/1
534 TABLET, FILM COATED ORAL 2 TIMES DAILY WITH MEALS
Status: DISCONTINUED | OUTPATIENT
Start: 2023-03-03 | End: 2023-03-05 | Stop reason: HOSPADM

## 2023-03-03 RX ORDER — M-VIT,TX,IRON,MINS/CALC/FOLIC 27MG-0.4MG
1 TABLET ORAL DAILY
Status: DISCONTINUED | OUTPATIENT
Start: 2023-03-03 | End: 2023-03-05 | Stop reason: HOSPADM

## 2023-03-03 RX ORDER — POLYETHYLENE GLYCOL 3350 17 G/17G
17 POWDER, FOR SOLUTION ORAL DAILY PRN
Status: DISCONTINUED | OUTPATIENT
Start: 2023-03-03 | End: 2023-03-05 | Stop reason: HOSPADM

## 2023-03-03 RX ORDER — ACETAMINOPHEN 650 MG/1
650 SUPPOSITORY RECTAL EVERY 6 HOURS PRN
Status: DISCONTINUED | OUTPATIENT
Start: 2023-03-03 | End: 2023-03-05 | Stop reason: HOSPADM

## 2023-03-03 RX ORDER — BENZONATATE 100 MG/1
100 CAPSULE ORAL EVERY 4 HOURS PRN
Status: DISCONTINUED | OUTPATIENT
Start: 2023-03-03 | End: 2023-03-05 | Stop reason: HOSPADM

## 2023-03-03 RX ORDER — NADOLOL 40 MG/1
20 TABLET ORAL DAILY
Status: DISCONTINUED | OUTPATIENT
Start: 2023-03-03 | End: 2023-03-05 | Stop reason: HOSPADM

## 2023-03-03 RX ORDER — DEXTROSE MONOHYDRATE 100 MG/ML
INJECTION, SOLUTION INTRAVENOUS CONTINUOUS PRN
Status: DISCONTINUED | OUTPATIENT
Start: 2023-03-03 | End: 2023-03-05 | Stop reason: HOSPADM

## 2023-03-03 RX ORDER — HYDROXYZINE HYDROCHLORIDE 10 MG/1
10 TABLET, FILM COATED ORAL 3 TIMES DAILY PRN
Status: DISCONTINUED | OUTPATIENT
Start: 2023-03-03 | End: 2023-03-05 | Stop reason: HOSPADM

## 2023-03-03 RX ORDER — LEVOTHYROXINE SODIUM 0.12 MG/1
125 TABLET ORAL DAILY
Status: DISCONTINUED | OUTPATIENT
Start: 2023-03-03 | End: 2023-03-05 | Stop reason: HOSPADM

## 2023-03-03 RX ORDER — PREDNISONE 20 MG/1
40 TABLET ORAL
Status: DISCONTINUED | OUTPATIENT
Start: 2023-03-05 | End: 2023-03-03

## 2023-03-03 RX ORDER — ONDANSETRON 4 MG/1
4 TABLET, ORALLY DISINTEGRATING ORAL EVERY 8 HOURS PRN
Status: DISCONTINUED | OUTPATIENT
Start: 2023-03-03 | End: 2023-03-05 | Stop reason: HOSPADM

## 2023-03-03 RX ORDER — DILTIAZEM HYDROCHLORIDE 240 MG/1
240 CAPSULE, COATED, EXTENDED RELEASE ORAL DAILY
Status: DISCONTINUED | OUTPATIENT
Start: 2023-03-03 | End: 2023-03-05 | Stop reason: HOSPADM

## 2023-03-03 RX ORDER — METHYLPREDNISOLONE SODIUM SUCCINATE 40 MG/ML
40 INJECTION, POWDER, LYOPHILIZED, FOR SOLUTION INTRAMUSCULAR; INTRAVENOUS EVERY 6 HOURS
Status: DISCONTINUED | OUTPATIENT
Start: 2023-03-03 | End: 2023-03-03

## 2023-03-03 RX ADMIN — MIRTAZAPINE 30 MG: 30 TABLET, FILM COATED ORAL at 20:22

## 2023-03-03 RX ADMIN — SODIUM CHLORIDE: 9 INJECTION, SOLUTION INTRAVENOUS at 01:16

## 2023-03-03 RX ADMIN — NADOLOL 20 MG: 40 TABLET ORAL at 10:15

## 2023-03-03 RX ADMIN — PIRFENIDONE 534 MG: 267 TABLET, FILM COATED ORAL at 20:22

## 2023-03-03 RX ADMIN — POTASSIUM CHLORIDE 20 MEQ: 1500 TABLET, EXTENDED RELEASE ORAL at 10:15

## 2023-03-03 RX ADMIN — MIRTAZAPINE 30 MG: 30 TABLET, FILM COATED ORAL at 01:37

## 2023-03-03 RX ADMIN — IPRATROPIUM BROMIDE AND ALBUTEROL SULFATE 1 AMPULE: 2.5; .5 SOLUTION RESPIRATORY (INHALATION) at 15:31

## 2023-03-03 RX ADMIN — PIRFENIDONE 534 MG: 267 TABLET, FILM COATED ORAL at 02:59

## 2023-03-03 RX ADMIN — Medication 10 ML: at 20:26

## 2023-03-03 RX ADMIN — IPRATROPIUM BROMIDE AND ALBUTEROL SULFATE 1 AMPULE: 2.5; .5 SOLUTION RESPIRATORY (INHALATION) at 20:39

## 2023-03-03 RX ADMIN — METHYLPREDNISOLONE SODIUM SUCCINATE 40 MG: 40 INJECTION, POWDER, FOR SOLUTION INTRAMUSCULAR; INTRAVENOUS at 10:16

## 2023-03-03 RX ADMIN — AZITHROMYCIN DIHYDRATE 500 MG: 500 INJECTION, POWDER, LYOPHILIZED, FOR SOLUTION INTRAVENOUS at 01:40

## 2023-03-03 RX ADMIN — METHYLPREDNISOLONE SODIUM SUCCINATE 40 MG: 40 INJECTION, POWDER, FOR SOLUTION INTRAMUSCULAR; INTRAVENOUS at 05:08

## 2023-03-03 RX ADMIN — ENOXAPARIN SODIUM 30 MG: 100 INJECTION SUBCUTANEOUS at 10:21

## 2023-03-03 RX ADMIN — SERTRALINE 200 MG: 100 TABLET, FILM COATED ORAL at 10:15

## 2023-03-03 RX ADMIN — ATORVASTATIN CALCIUM 40 MG: 40 TABLET, FILM COATED ORAL at 10:15

## 2023-03-03 RX ADMIN — PIRFENIDONE 534 MG: 267 TABLET, FILM COATED ORAL at 10:17

## 2023-03-03 RX ADMIN — MULTIPLE VITAMINS W/ MINERALS TAB 1 TABLET: TAB at 10:15

## 2023-03-03 RX ADMIN — DILTIAZEM HYDROCHLORIDE 240 MG: 240 CAPSULE, COATED, EXTENDED RELEASE ORAL at 10:15

## 2023-03-03 RX ADMIN — LORAZEPAM 0.5 MG: 0.5 TABLET ORAL at 18:37

## 2023-03-03 RX ADMIN — LEVOTHYROXINE SODIUM 125 MCG: 125 TABLET ORAL at 06:17

## 2023-03-03 RX ADMIN — Medication 10 ML: at 01:17

## 2023-03-03 RX ADMIN — IPRATROPIUM BROMIDE AND ALBUTEROL SULFATE 1 AMPULE: 2.5; .5 SOLUTION RESPIRATORY (INHALATION) at 11:45

## 2023-03-03 RX ADMIN — INSULIN LISPRO 2 UNITS: 100 INJECTION, SOLUTION INTRAVENOUS; SUBCUTANEOUS at 11:26

## 2023-03-03 RX ADMIN — BENZONATATE 100 MG: 100 CAPSULE ORAL at 14:05

## 2023-03-03 RX ADMIN — PANTOPRAZOLE SODIUM 40 MG: 40 TABLET, DELAYED RELEASE ORAL at 06:17

## 2023-03-03 RX ADMIN — IPRATROPIUM BROMIDE AND ALBUTEROL SULFATE 1 AMPULE: 2.5; .5 SOLUTION RESPIRATORY (INHALATION) at 07:58

## 2023-03-03 RX ADMIN — ALBUTEROL SULFATE 2.5 MG: 2.5 SOLUTION RESPIRATORY (INHALATION) at 03:47

## 2023-03-03 ASSESSMENT — LIFESTYLE VARIABLES
HOW MANY STANDARD DRINKS CONTAINING ALCOHOL DO YOU HAVE ON A TYPICAL DAY: PATIENT DOES NOT DRINK
HOW OFTEN DO YOU HAVE A DRINK CONTAINING ALCOHOL: NEVER

## 2023-03-03 NOTE — PROGRESS NOTES
Patient admitted to room 328 from [facility] ER. Patient oriented to room, call light, bed rails, phone, lights and bathroom. Patient instructed about the schedule of the day including: vital sign frequency, lab draws, possible tests, frequency of MD and staff rounds, daily weights, I &O's and prescribed diet. Telemetry box in place, patient aware of placement and reason. Bed locked, in lowest position, side rails up 2/4, call light within reach. Recliner Assessment  Patient is not able to demonstrated the ability to move from a reclining position to an upright position within the recliner. however patient is alert, oriented and able to provide informed consent       4 Eyes Skin Assessment     The patient is being assess for   Admission    I agree that 2 RN's have performed a thorough Head to Toe Skin Assessment on the patient. ALL assessment sites listed below have been assessed. Areas assessed for pressure by both nurses:   [x]   Head, Face, and Ears   [x]   Shoulders, Back, and Chest, Abdomen  [x]   Arms, Elbows, and Hands   [x]   Coccyx, Sacrum, and Ischium  [x]   Legs, Feet, and Heels        Skin Assessed Under all Medical Devices by both nurses:  O2 device tubing              All Mepilex Borders were peeled back and area peeked at by both nurses:    Please list where Mepilex Borders are located:  NA             **SHARE this note so that the co-signing nurse is able to place an eSignature**    Co-signer eSignature: Electronically signed by Avelina Freeman RN on 3/3/23 at 2:07 AM EST    Does the Patient have Skin Breakdown related to pressure?   No            Amos Prevention initiated:  No   Wound Care Orders initiated:  No      [facility] nurse consulted for Pressure Injury (Stage 3,4, Unstageable, DTI, NWPT, Complex wounds)and New or Established Ostomies:  No      Primary Nurse eSignature: Electronically signed by Lacie Del Castillo RN on 3/3/23 at 1:29 AM EST

## 2023-03-03 NOTE — PROGRESS NOTES
RT Inhaler-Nebulizer Bronchodilator Protocol Note    There is a bronchodilator order in the chart from a provider indicating to follow the RT Bronchodilator Protocol and there is an Initiate RT Inhaler-Nebulizer Bronchodilator Protocol order as well (see protocol at bottom of note). CXR Findings:  XR CHEST PORTABLE    Result Date: 3/2/2023  Extensive chronic lung changes. Aeration of the right lower lobe may have slightly worsened. The findings from the last RT Protocol Assessment were as follows:   History Pulmonary Disease: Chronic pulmonary disease  Respiratory Pattern: Dyspnea on exertion or RR 21-25 bpm  Breath Sounds: Slightly diminished and/or crackles  Cough: Strong, spontaneous, non-productive  Indication for Bronchodilator Therapy: Decreased or absent breath sounds  Bronchodilator Assessment Score: 6    Aerosolized bronchodilator medication orders have been revised according to the RT Inhaler-Nebulizer Bronchodilator Protocol below. Respiratory Therapist to perform RT Therapy Protocol Assessment initially then follow the protocol. Repeat RT Therapy Protocol Assessment PRN for score 0-3 or on second treatment, BID, and PRN for scores above 3. No Indications - adjust the frequency to every 6 hours PRN wheezing or bronchospasm, if no treatments needed after 48 hours then discontinue using Per Protocol order mode. If indication present, adjust the RT bronchodilator orders based on the Bronchodilator Assessment Score as indicated below. Use Inhaler orders unless patient has one or more of the following: on home nebulizer, not able to hold breath for 10 seconds, is not alert and oriented, cannot activate and use MDI correctly, or respiratory rate 25 breaths per minute or more, then use the equivalent nebulizer order(s) with same Frequency and PRN reasons based on the score. If a patient is on this medication at home then do not decrease Frequency below that used at home.     0-3 - enter or revise RT bronchodilator order(s) to equivalent RT Bronchodilator order with Frequency of every 4 hours PRN for wheezing or increased work of breathing using Per Protocol order mode. 4-6 - enter or revise RT Bronchodilator order(s) to two equivalent RT bronchodilator orders with one order with BID Frequency and one order with Frequency of every 4 hours PRN wheezing or increased work of breathing using Per Protocol order mode. 7-10 - enter or revise RT Bronchodilator order(s) to two equivalent RT bronchodilator orders with one order with TID Frequency and one order with Frequency of every 4 hours PRN wheezing or increased work of breathing using Per Protocol order mode. 11-13 - enter or revise RT Bronchodilator order(s) to one equivalent RT bronchodilator order with QID Frequency and an Albuterol order with Frequency of every 4 hours PRN wheezing or increased work of breathing using Per Protocol order mode. Greater than 13 - enter or revise RT Bronchodilator order(s) to one equivalent RT bronchodilator order with every 4 hours Frequency and an Albuterol order with Frequency of every 2 hours PRN wheezing or increased work of breathing using Per Protocol order mode. PATIENT STATES QID IS HOME REGIMENT.      Electronically signed by Sushma Meier RCP on 3/3/2023 at 3:51 AM

## 2023-03-03 NOTE — CARE COORDINATION
Palliative Care Consult. CM chart review. +Frequent readmissions for CHF/COPD/Resp failure. Pt lives w/spouse. Spouse defers to daughter for medical decisions and to coordinate care in the home. Daughter provides personal care 4 days a week and is paid through [employer]. Recently pt's care needs have increased but [employer] is unable to provide additional caregiver hours or personnel. Daughter is reaching out for additional resources.  Interested in an informational meeting with Emmanuel Velasco for more info on palliative care and hospice care options

## 2023-03-03 NOTE — ED PROVIDER NOTES
[address] [facility]. [facility] Emergency Department      CHIEF COMPLAINT  Shortness of Breath (C/o worsening SOB since yesterday. Denies fevers, cough, CP or further c/o's)      HISTORY OF PRESENT ILLNESS  Analia Obando is a 77 y.o. female with a history of multiple medical problems including atrial fibrillation not anticoagulated currently wearing a heart monitor being worked up for an outpatient Watchman procedure. Also has a history of diastolic heart failure, interstitial lung disease and cryptogenic organizing pneumonia who is chronically on 4 L of oxygen at rest and 8 L with exertion. She presents with worsening shortness of breath. She states she feels \"terrible\". She has been feeling this way for a couple days. She was just admitted to the hospital for pneumonia and discharged a couple weeks ago. She states her oxygen sats at rest were dropping into the 80s at home on her baseline 4 L. She denies chest pain. No trauma or fall. No fevers. No other complaints, modifying factors or associated symptoms. History obtained from the patient. I have reviewed the following from the nursing documentation. Past Medical History:   Diagnosis Date    A-fib ([address]     Anxiety     COPD (chronic obstructive pulmonary disease) ([address]     Cryptogenic organizing pneumonia ([address]     Depression     Diastolic CHF ([address]     From ECHO. GERD (gastroesophageal reflux disease)     Hyperlipidemia     On home oxygen therapy     uses O2 3L prn    Rash     Thyroid disease     hypothyroidism     Past Surgical History:   Procedure Laterality Date    ARM SURGERY Left 3/2/2020    LEFT ULNAR NERVE DECOMPRESSION AT THE ELBOW performed by Dena Cardona MD at [facility] N/A 6/27/2019    EBUS WF W/ANES.  performed by Manuel Villa MD at [place] [address]  6/27/2019    BRONCHOSCOPY/TRANSBRONCHIAL NEEDLE BIOPSY performed by Manuel Villa MD at [place] [address] 6/27/2019    BRONCHOSCOPY/TRANSBRONCHIAL LUNG BIOPSY performed by Markus Sotelo MD at [place] [address]  6/27/2019    BRONCHOSCOPY ALVEOLAR LAVAGE performed by Markus Sotelo MD at [place] [address]  07/10/2019    BRONCHOSCOPY N/A 7/10/2019    BRONCHOSCOPY ALVEOLAR LAVAGE performed by Jb Jarrell MD at [place] [address] Bilateral 08/06/2019    BRONCHOSCOPY N/A 8/6/2019    BRONCHOSCOPY ALVEOLAR LAVAGE performed by Yesika Alvarez MD at [place] [address] N/A 12/24/2019    BRONCHOSCOPY ALVEOLAR LAVAGE performed by Sindy Aguirre MD at [facility]      COLONOSCOPY N/A 8/25/2022    COLONOSCOPY POLYPECTOMY SNARE/COLD BIOPSY performed by Indy Gilliland MD at [address]  9/15/2022    CT GUIDED CHEST TUBE 9/15/2022 [address] SCAN    CT INSERT CATH PLEURA W IMAGE  11/28/2022    CT INSERT CATH PLEURA W IMAGE 11/28/2022 Jason Birch MD [facility] CT SCAN    HYSTERECTOMY, TOTAL ABDOMINAL (CERVIX REMOVED)      partial     Family History   Problem Relation Age of Onset    Diabetes Mother     High Blood Pressure Mother     Asthma Sister     Other Father      Social History     Socioeconomic History    Marital status:      Spouse name: Not on file    Number of children: 7    Years of education: Not on file    Highest education level: Not on file   Occupational History    Not on file   Tobacco Use    Smoking status: Never    Smokeless tobacco: Never   Vaping Use    Vaping Use: Never used   Substance and Sexual Activity    Alcohol use: No    Drug use: No    Sexual activity: Not Currently   Other Topics Concern    Not on file   Social History Narrative    Not on file     Social Determinants of Health     Financial Resource Strain: Not on file   Food Insecurity: Not on file   Transportation Needs: No Transportation Needs    Lack of Transportation (Medical): No    Lack of Transportation (Non-Medical):  No Physical Activity: Not on file   Stress: Not on file   Social Connections: Not on file   Intimate Partner Violence: Not on file   Housing Stability: Not on file     No current facility-administered medications for this encounter. Current Outpatient Medications   Medication Sig Dispense Refill    traMADol (ULTRAM) 50 MG tablet Take 1-2 tablets by mouth every 6 hours as needed for Pain for up to 180 days.  Take lowest dose possible to manage pain 240 tablet 5    potassium chloride (KLOR-CON M) 20 MEQ extended release tablet Take 1 tablet by mouth daily 30 tablet 1    sodium chloride (OCEAN) 0.65 % nasal spray 1 spray by Nasal route in the morning and at bedtime 1 each 3    guaiFENesin (MUCINEX) 600 MG extended release tablet Take 600 mg by mouth as needed for Congestion      hydrOXYzine HCl (ATARAX) 10 MG tablet Take 10 mg by mouth 3 times daily as needed for Itching      omeprazole (PRILOSEC) 40 MG delayed release capsule Take 1 capsule by mouth every morning (before breakfast) 90 capsule 1    ondansetron (ZOFRAN) 4 MG tablet Take 1 tablet by mouth 3 times daily as needed for Nausea or Vomiting 30 tablet 2    levothyroxine (SYNTHROID) 125 MCG tablet Take 1 tablet by mouth Daily TAKE 1 TABLET BY MOUTH EVERY DAY 90 tablet 1    benzonatate (TESSALON) 200 MG capsule TAKE 1 CAPSULE BY MOUTH 3 TIMES A DAY AS NEEDED FOR COUGH 90 capsule 5    nadolol (CORGARD) 20 MG tablet Take 1 tablet by mouth daily 30 tablet 3    furosemide (LASIX) 40 MG tablet Take 0.5 tablets by mouth See Admin Instructions Daily prn for edema 60 tablet 3    albuterol sulfate HFA (PROVENTIL;VENTOLIN;PROAIR) 108 (90 Base) MCG/ACT inhaler INHALE 2 PUFFS INTO THE LUNGS EVERY 6 HOURS AS NEEDED FOR WHEEZING 3 each 1    atorvastatin (LIPITOR) 40 MG tablet TAKE 1 TABLET EVERY DAY 90 tablet 1    dilTIAZem (CARDIZEM CD) 240 MG extended release capsule Take 1 capsule by mouth daily 30 capsule 2    Multiple Vitamins-Minerals (WOMENS ONE DAILY PO) Take 1 tablet by mouth daily      sertraline (ZOLOFT) 100 MG tablet TAKE 2 TABLETS EVERY  tablet 1    mirtazapine (REMERON) 30 MG tablet TAKE 1 TABLET EVERY NIGHT 90 tablet 1    Pirfenidone 267 MG TABS pirfenidone daily for a week and then BID for a week and then back to TID. (Patient taking differently: Indications: taking BID - makes her sick to take more frequently pirfenidone daily for a week and then BID for a week and then back to TID.) 270 tablet 3    LORazepam (ATIVAN) 0.5 MG tablet Take 0.5 mg by mouth every 6 hours as needed for Anxiety. traMADol (ULTRAM) 50 MG tablet Take 50 mg by mouth 2 times daily as needed for Pain. albuterol (PROVENTIL) (2.5 MG/3ML) 0.083% nebulizer solution INHALE THE CONTENTS OF 1 VIAL VIA NEBULIZER EVERY 6 HOURS AS NEEDED FOR WHEEZING 720 mL 1     Allergies   Allergen Reactions    Penicillins Anaphylaxis     Tolerates Meropenem. Cefuroxime      Tolerates Meropenem. Doxycycline Hives    Imitrex [Sumatriptan] Other (See Comments)     Feels like pins and needles    Meperidine     Sulfa Antibiotics Hives    Keflex [Cephalexin] Itching and Rash     Tolerates Meropenem. Tape Karene Ruthy Tape] Rash       REVIEW OF SYSTEMS    Unless otherwise stated in this report, this patient's positive and negative responses for review of systems (constitutional, eyes, ENT, cardiovascular, respiratory, gastrointestinal, neurological, genitourinary, musculoskeletal, integument systems and systems related to the presenting problem) are either stated in the preceding paragraph, were not pertinent or were negative for the symptoms and/or complaints related to the medical problem. PHYSICAL EXAM  /75   Pulse 95   Temp 98.2 °F (36.8 °C) (Oral)   Resp 17   Ht 5' 3\" (1.6 m)   Wt 107 lb (48.5 kg)   SpO2 96%   BMI 18.95 kg/m²   GENERAL APPEARANCE: Awake and alert. Cooperative. Tachypneic, chronically ill-appearing. HEAD: Normocephalic. Atraumatic.   EYES: PERRL. EOM's grossly intact. ENT: Mucous membranes are moist.   NECK: Supple, trachea midline. HEART: RRR. LUNGS: Respirations labored, tachypneic. CTAB. Good air exchange. No wheezes, rales, or rhonchi. Speaking in truncated sentences. ABDOMEN:  Soft. Non-distended. Non-tender. No guarding or rebound. EXTREMITIES: No peripheral edema. MAEE. No acute deformities. SKIN: Warm, dry and intact. No acute rashes. NEUROLOGICAL: Alert and oriented X 3. PSYCHIATRIC: Normal mood and affect. LABS  I have reviewed all labs for this visit.    Results for orders placed or performed during the hospital encounter of 03/02/23   COVID-19, Rapid    Specimen: Nasopharyngeal Swab   Result Value Ref Range    SARS-CoV-2, NAAT Not Detected Not Detected   Rapid influenza A/B antigens    Specimen: Nasopharyngeal   Result Value Ref Range    Rapid Influenza A Ag Negative Negative    Rapid Influenza B Ag Negative Negative   CBC with Auto Differential   Result Value Ref Range    WBC 8.1 4.0 - 11.0 K/uL    RBC 3.96 (L) 4.00 - 5.20 M/uL    Hemoglobin 9.9 (L) 12.0 - 16.0 g/dL    Hematocrit 31.3 (L) 36.0 - 48.0 %    MCV 79.2 (L) 80.0 - 100.0 fL    MCH 25.0 (L) 26.0 - 34.0 pg    MCHC 31.6 31.0 - 36.0 g/dL    RDW 15.7 (H) 12.4 - 15.4 %    Platelets 435 376 - 163 K/uL    MPV 7.5 5.0 - 10.5 fL    Neutrophils % 83.9 %    Lymphocytes % 11.0 %    Monocytes % 3.6 %    Eosinophils % 1.3 %    Basophils % 0.2 %    Neutrophils Absolute 6.8 1.7 - 7.7 K/uL    Lymphocytes Absolute 0.9 (L) 1.0 - 5.1 K/uL    Monocytes Absolute 0.3 0.0 - 1.3 K/uL    Eosinophils Absolute 0.1 0.0 - 0.6 K/uL    Basophils Absolute 0.0 0.0 - 0.2 K/uL   Comprehensive Metabolic Panel   Result Value Ref Range    Sodium 141 136 - 145 mmol/L    Potassium 4.1 3.5 - 5.1 mmol/L    Chloride 96 (L) 99 - 110 mmol/L    CO2 38 (H) 21 - 32 mmol/L    Anion Gap 7 3 - 16    Glucose 115 (H) 70 - 99 mg/dL    BUN 12 7 - 20 mg/dL    Creatinine <0.5 (L) 0.6 - 1.2 mg/dL    Est, Glom Filt Rate >60 >60    Calcium 9.4 8.3 - 10.6 mg/dL    Total Protein 7.1 6.4 - 8.2 g/dL    Albumin 3.9 3.4 - 5.0 g/dL    Albumin/Globulin Ratio 1.2 1.1 - 2.2    Total Bilirubin 0.3 0.0 - 1.0 mg/dL    Alkaline Phosphatase 93 40 - 129 U/L    ALT 28 10 - 40 U/L    AST 38 (H) 15 - 37 U/L   Protime-INR   Result Value Ref Range    Protime 13.9 11.7 - 14.5 sec    INR 1.08 0.87 - 1.14   Troponin   Result Value Ref Range    Troponin <0.01 <0.01 ng/mL   Brain Natriuretic Peptide   Result Value Ref Range    Pro- (H) 0 - 124 pg/mL   Blood gas, venous   Result Value Ref Range    pH, [place] 7.397 7.350 - 7.450    pCO2, [place] 62.7 (H) 40.0 - 50.0 mmHg    pO2, [place] 51.3 (H) 25.0 - 40.0 mmHg    HCO3, Venous 37.7 (H) 23.0 - 29.0 mmol/L    Base Excess, [place] 11.0 (H) -3.0 - 3.0 mmol/L    O2 Sat, [place] 85 Not Established %    Carboxyhemoglobin 0.6 0.0 - 1.5 %    MetHgb, [place] 0.3 <1.5 %    TC02 (Calc), [place] 40 Not Established mmol/L    O2 Therapy Unknown    EKG 12 Lead   Result Value Ref Range    Ventricular Rate 92 BPM    Atrial Rate 92 BPM    P-R Interval 148 ms    QRS Duration 76 ms    Q-T Interval 356 ms    QTc Calculation (Bazett) 440 ms    P Axis 2 degrees    R Axis -1 degrees    T Axis -11 degrees    Diagnosis       Normal sinus rhythmVoltage criteria for left ventricular hypertrophyT wave abnormality, consider inferior ischemiaAbnormal ECGWhen compared with ECG of 21-FEB-2023 00:29,Premature supraventricular complexes are no longer Present       EKG  The Ekg interpreted as interpreted by me:  normal sinus rhythm with a rate of 92  Axis is   Normal  QTc is  normal  Intervals and Durations are unremarkable. T wave inversions in leads III and aVF. No evidence of acute ischemia. No significant change from prior EKG dated February 21, 2023    Cardiac Monitoring: The cardiac monitor revealed normal sinus rhythm as interpreted by me.  The cardiac monitor was ordered secondary to the patient's complaint of shortness of breath and to monitor the patient for dysrhythmia. RADIOLOGY  X-RAYS: ALL IMAGES INCLUDING PLAIN FILMS, CT, ULTRASOUND AND MRI HAVE BEEN READ BY THE RADIOLOGIST. CT CHEST PULMONARY EMBOLISM W CONTRAST   Final Result   Limited exam.      No definite evidence of pulmonary embolism in the central pulmonary arteries. Severe emphysema with fibrotic changes, unchanged. No focal consolidations. No pleural effusion. Apparent step-off of the manubrium and inferior sternum could be an acute   fracture versus artifactual related to patient's motion. Correlated with   clinical exam and point tenderness, if indicated this could be further   evaluated with sternal views. XR CHEST PORTABLE   Final Result   Extensive chronic lung changes. Aeration of the right lower lobe may have   slightly worsened. I have personally reviewed Xray and Ultrasound images and radiology confirms the interpretation:  Chest x-ray with no pneumothorax      Medications administered. (Dose and Route):  DuoNeb 1 ampoule inhaled, Solu-Medrol 125 mg IV. Critical Care:I personally spent a total of 40 minutes of critical care time in obtaining history, performing a physical exam, bedside monitoring of interventions, collecting and interpreting tests and discussion with consultants but excluding time spent performing procedures, treating other patients and teaching time. Sepsis:  Is this patient to be included in the SEP-1 Core Measure due to severe sepsis or septic shock? No   Exclusion criteria - the patient is NOT to be included for SEP-1 Core Measure due to:  2+ SIRS criteria are not met             ED COURSE/MDM:  Patient seen and evaluated. Here the patient is afebrile with normal vitals signs, however she is requiring 8 to 9 L of high flow nasal cannula oxygen. .     Old records reviewed: Reviewed details of the patient's most recent admission on February 19, 2023. She was admitted for a very similar presentation with acute on chronic respiratory failure with hypoxia. She did have pneumonia during this admission as well. Diagnoses affirmatively addressed, consideration of tests, treatments & admission, including shared decision making:    Here the patient is short of breath requiring 8 to 9 L of high flow nasal cannula oxygen. Lungs are clear however. She does have significant chronic pulmonary history. Lab work here shows normal BNP. EKG with no ischemic changes, troponin negative. COVID and flu test are negative. VBG does show hypercapnia. She is got chronic anemia on lab work. Chest x-ray with chronic lung disease with slightly worsening disease in the right base. CT of the chest however appears stable with no PE. There is question of a sternal fracture on CT but the patient has no point tenderness there and has no history of trauma. This is likely artifact. I did give her IV Solu-Medrol here and 1 DuoNeb. I suspect this is an exacerbation of her chronic lung disease. I will admit her to the hospital at [facility] for pulmonology evaluation and further treatment. Consultation summary: Discussed the patient with Dr. Jason Ontiveros and he is excepted her for admission. Social determinants of health: None. Labs and imaging reviewed and results discussed with patient. Patient was reassessed as noted above . Plan of care discussed with patient. Patient in agreement with plan. CLINICAL IMPRESSION  1. Acute on chronic respiratory failure with hypoxia and hypercapnia (HCC)        Blood pressure 134/75, pulse 95, temperature 98.2 °F (36.8 °C), temperature source Oral, resp. rate 17, height 5' 3\" (1.6 m), weight 107 lb (48.5 kg), SpO2 96 %, not currently breastfeeding. DISPOSITION  Armin Obando was admitted in stable condition. Jodie Fine MD am the primary clinician of record.     (Please note this note was completed with a voice recognition program.  Efforts were made to edit the dictations but occasionally words are mis-transcribed.)        Nilesh Maynard MD  03/02/23 [id number]

## 2023-03-03 NOTE — PROGRESS NOTES
RT Inhaler-Nebulizer Bronchodilator Protocol Note    There is a bronchodilator order in the chart from a provider indicating to follow the RT Bronchodilator Protocol and there is an Initiate RT Inhaler-Nebulizer Bronchodilator Protocol order as well (see protocol at bottom of note). CXR Findings:  XR CHEST PORTABLE    Result Date: 3/2/2023  Extensive chronic lung changes. Aeration of the right lower lobe may have slightly worsened. The findings from the last RT Protocol Assessment were as follows:   History Pulmonary Disease: Chronic pulmonary disease  Respiratory Pattern: Dyspnea on exertion or RR 21-25 bpm  Breath Sounds: Slightly diminished and/or crackles  Cough: Strong, spontaneous, non-productive  Indication for Bronchodilator Therapy: Decreased or absent breath sounds  Bronchodilator Assessment Score: 6    Aerosolized bronchodilator medication orders have been revised according to the RT Inhaler-Nebulizer Bronchodilator Protocol below. Respiratory Therapist to perform RT Therapy Protocol Assessment initially then follow the protocol. Repeat RT Therapy Protocol Assessment PRN for score 0-3 or on second treatment, BID, and PRN for scores above 3. No Indications - adjust the frequency to every 6 hours PRN wheezing or bronchospasm, if no treatments needed after 48 hours then discontinue using Per Protocol order mode. If indication present, adjust the RT bronchodilator orders based on the Bronchodilator Assessment Score as indicated below. Use Inhaler orders unless patient has one or more of the following: on home nebulizer, not able to hold breath for 10 seconds, is not alert and oriented, cannot activate and use MDI correctly, or respiratory rate 25 breaths per minute or more, then use the equivalent nebulizer order(s) with same Frequency and PRN reasons based on the score. If a patient is on this medication at home then do not decrease Frequency below that used at home.     0-3 - enter or revise RT bronchodilator order(s) to equivalent RT Bronchodilator order with Frequency of every 4 hours PRN for wheezing or increased work of breathing using Per Protocol order mode. 4-6 - enter or revise RT Bronchodilator order(s) to two equivalent RT bronchodilator orders with one order with BID Frequency and one order with Frequency of every 4 hours PRN wheezing or increased work of breathing using Per Protocol order mode. 7-10 - enter or revise RT Bronchodilator order(s) to two equivalent RT bronchodilator orders with one order with TID Frequency and one order with Frequency of every 4 hours PRN wheezing or increased work of breathing using Per Protocol order mode. 11-13 - enter or revise RT Bronchodilator order(s) to one equivalent RT bronchodilator order with QID Frequency and an Albuterol order with Frequency of every 4 hours PRN wheezing or increased work of breathing using Per Protocol order mode. Greater than 13 - enter or revise RT Bronchodilator order(s) to one equivalent RT bronchodilator order with every 4 hours Frequency and an Albuterol order with Frequency of every 2 hours PRN wheezing or increased work of breathing using Per Protocol order mode.          Electronically signed by Marisol Varma RCP on 3/3/2023 at 3:34 PM

## 2023-03-03 NOTE — ACP (ADVANCE CARE PLANNING)
Advance Care Planning     General Advance Care Planning (ACP) Conversation    Date of Conversation: 3/2/2023  Conducted with:  Healthcare Decision Maker: Named in Advance Directive or Healthcare Power of  (name) [id number] [address] Decision Maker:    Primary Decision Maker: Helene Hewitt - Spouse - [phone]    Secondary Decision Maker: Rosa Isela Callaway - Child - [phone]  Click here to complete Healthcare Decision Makers including selection of the Healthcare Decision Maker Relationship (ie \"Primary\"). Today we documented Decision Maker(s) consistent with ACP documents on file. Content/Action Overview:   Has ACP document(s) on file - reflects the patient's care preferences  Reviewed DNR/DNI and patient elects Full Code (Attempt Resuscitation)        Length of Voluntary ACP Conversation in minutes:  <16 minutes (Non-Billable)    Miriam Guillaume RN

## 2023-03-03 NOTE — DISCHARGE INSTRUCTIONS
Heart Failure Resources:    Heart Failure Interactive Workbook:   Go to www.kswMemrise.com/aha-heartfailure for a Free Heart Failure Interactive Workbook provided by [employer]. This interactive workbook will provide information on Healthier Living with Heart Failure. Please copy and paste link into search bar. Use your mouse to scroll through the pages. HF [place] genny:   Heart Failure Free smart phone genny available for iPhone and Android download. Use your phone to track sodium intake, fluid intake, symptoms, and weight. Low Sodium Diet:  Go to www. [employer]. org website for Nutrabolt which is Low Sodium! [employer] is a dialysis company, but this website offers free seasonal cookbooks. Each quarter, they will release 25-30 new recipes with a breakdown of calories, sodium, and glucose. Recipes:   Go to [email or web address]/recipes website for free recipes.

## 2023-03-03 NOTE — ED NOTES
Pt placed on bedpan at this time. Requested some privacy at this time.      Ton Merrill RN  03/02/23 [id number]

## 2023-03-03 NOTE — H&P
Hospital Medicine History & Physical      PCP: Eva Ch MD    Date of Service: Pt seen/examined on 3/3/23 and admitted on 3/3/23 to Inpatient. Chief Complaint   Patient presents with    Shortness of Breath     C/o worsening SOB since yesterday. Denies fevers, cough, CP or further c/o's       History Of Present Illness: The patient is a 77 y.o. female with PMH below, presents with SOB/LEE, increased O2 demand. Pr reports she began feeling poorly several days ago. She has felt worse since the onset. She has Hx of ILD and dCHF. She is normally on 3-4L at rest and 8L w/ exertion. She has been having to wear 8 L at all times and still is having significant desats and becomes winded w/ minimal exertion. She jones CP. PT was recently admitted after similar presentation and was treated for aeILD and PNA. She was DC on 2/22. Past Medical History:        Diagnosis Date    A-fib ([address]     Anxiety     COPD (chronic obstructive pulmonary disease) ([facility]     Cryptogenic organizing pneumonia ([address]     Depression     Diastolic CHF ([facility]     From ECHO. GERD (gastroesophageal reflux disease)     Hyperlipidemia     On home oxygen therapy     uses O2 3L prn    Rash     Thyroid disease     hypothyroidism       Past Surgical History:        Procedure Laterality Date    ARM SURGERY Left 3/2/2020    LEFT ULNAR NERVE DECOMPRESSION AT THE ELBOW performed by Theresa Ureña MD at [facility] N/A 6/27/2019    EBUS WF W/ANES.  performed by Dang He MD at [place] [address]  6/27/2019    BRONCHOSCOPY/TRANSBRONCHIAL NEEDLE BIOPSY performed by Dang He MD at [place] [address]  6/27/2019    BRONCHOSCOPY/TRANSBRONCHIAL LUNG BIOPSY performed by Dang He MD at [place] [address]  6/27/2019    BRONCHOSCOPY ALVEOLAR LAVAGE performed by Dang He MD at [place] [address]  07/10/2019    BRONCHOSCOPY N/A 7/10/2019 BRONCHOSCOPY ALVEOLAR LAVAGE performed by Garret Forbes MD at [facility] Bilateral 08/06/2019    BRONCHOSCOPY N/A 8/6/2019    BRONCHOSCOPY ALVEOLAR LAVAGE performed by Amara Adame MD at [place] [address] N/A 12/24/2019    BRONCHOSCOPY ALVEOLAR LAVAGE performed by Carlene Wan MD at [facility] N/A 8/25/2022    COLONOSCOPY POLYPECTOMY SNARE/COLD BIOPSY [facility]  9/15/2022    CT GUIDED CHEST TUBE 9/15/2022 [facility] CT SCAN    CT INSERT CATH PLEURA W IMAGE  11/28/2022    CT INSERT CATH PLEURA W IMAGE 11/28/2022 Demetrice Vázquez MD [facility] CT SCAN    HYSTERECTOMY, TOTAL ABDOMINAL (CERVIX REMOVED)      partial       Medications Prior to Admission:    Prior to Admission medications    Medication Sig Start Date End Date Taking? Authorizing Provider   traMADol (ULTRAM) 50 MG tablet Take 1-2 tablets by mouth every 6 hours as needed for Pain for up to 180 days.  Take lowest dose possible to manage pain 2/28/23 8/27/23  Marino Felix MD   potassium chloride (KLOR-CON M) 20 MEQ extended release tablet Take 1 tablet by mouth daily 2/22/23   Matilda Pineda MD   sodium chloride (OCEAN) 0.65 % nasal spray 1 spray by Nasal route in the morning and at bedtime 2/22/23   Matilda Pineda MD   guaiFENesin (MUCINEX) 600 MG extended release tablet Take 600 mg by mouth as needed for Congestion    Historical Provider, MD   hydrOXYzine HCl (ATARAX) 10 MG tablet Take 10 mg by mouth 3 times daily as needed for Itching    Historical Provider, MD   omeprazole (PRILOSEC) 40 MG delayed release capsule Take 1 capsule by mouth every morning (before breakfast) 2/13/23   Marino Felix MD   ondansetron (ZOFRAN) 4 MG tablet Take 1 tablet by mouth 3 times daily as needed for Nausea or Vomiting 1/30/23   Marino Felix MD   levothyroxine (SYNTHROID) 125 MCG tablet Take 1 tablet by mouth Daily TAKE 1 TABLET BY MOUTH EVERY DAY 1/26/23   Charlotte Majano MD   benzonatate (TESSALON) 200 MG capsule TAKE 1 CAPSULE BY MOUTH 3 TIMES A DAY AS NEEDED FOR COUGH 1/26/23   Charlotte Majano MD   nadolol (CORGARD) 20 MG tablet Take 1 tablet by mouth daily 1/18/23   Jonnie Mitchell MD   furosemide (LASIX) 40 MG tablet Take 0.5 tablets by mouth See Admin Instructions Daily prn for edema 1/18/23   Jonnie Mitchell MD   albuterol sulfate HFA (PROVENTIL;VENTOLIN;PROAIR) 108 (90 Base) MCG/ACT inhaler INHALE 2 PUFFS INTO THE LUNGS EVERY 6 HOURS AS NEEDED FOR WHEEZING 1/13/23   Ani Sifuentes MD   atorvastatin (LIPITOR) 40 MG tablet TAKE 1 TABLET EVERY DAY 12/22/22   Charlotte Majano MD   dilTIAZem (CARDIZEM CD) 240 MG extended release capsule Take 1 capsule by mouth daily 12/8/22   Mayi Aguirre MD   Multiple Vitamins-Minerals (WOMENS ONE DAILY PO) Take 1 tablet by mouth daily    Historical Provider, MD   sertraline (ZOLOFT) 100 MG tablet TAKE 2 TABLETS EVERY DAY 10/25/22   Charlotte Majano MD   mirtazapine (REMERON) 30 MG tablet TAKE 1 TABLET EVERY NIGHT 10/25/22   Charlotte Majano MD   Pirfenidone 267 MG TABS pirfenidone daily for a week and then BID for a week and then back to TID. Patient taking differently: Indications: taking BID - makes her sick to take more frequently pirfenidone daily for a week and then BID for a week and then back to TID. 10/22/22   Jonnie Mitchell MD   LORazepam (ATIVAN) 0.5 MG tablet Take 0.5 mg by mouth every 6 hours as needed for Anxiety. Historical Provider, MD   traMADol (ULTRAM) 50 MG tablet Take 50 mg by mouth 2 times daily as needed for Pain.     Historical Provider, MD   albuterol (PROVENTIL) (2.5 MG/3ML) 0.083% nebulizer solution INHALE THE CONTENTS OF 1 VIAL VIA NEBULIZER EVERY 6 HOURS AS NEEDED FOR WHEEZING 2/7/22   Charlotte Majano MD       Allergies:  Penicillins, Cefuroxime, Doxycycline, Imitrex [sumatriptan], Meperidine, Sulfa antibiotics, Keflex [cephalexin], and Tape Santos Shah tape]    Social History:    TOBACCO:   reports that she has never smoked. She has never used smokeless tobacco.  ETOH:   reports no history of alcohol use. Family History:  Reviewed in detail and negative for DM, Early CAD, Cancer (except as below). Positive as follows:        Problem Relation Age of Onset    Diabetes Mother     High Blood Pressure Mother     Asthma Sister     Other Father        REVIEW OF SYSTEMS:   Pertinent positives/negatives as follows: SOB/LEE, increased O2 demand, and as discussed in HPI, otherwise a complete ROS performed and all other systems are negative. PHYSICAL EXAM PERFORMED:  /75   Pulse (!) 103   Temp 97.1 °F (36.2 °C) (Oral)   Resp 20   Ht 5' 3\" (1.6 m)   Wt 105 lb 6.4 oz (47.8 kg)   SpO2 99%   BMI 18.67 kg/m²   GEN:  A&Ox3, increased WOB. + conversational dyspnea. HEENT:  NC/AT,EOMI, semi dry MM, no erythema/exudates or visible masses. CVS:  Normal S1,S2. Tachy RRR. Without M/G/R.   LUNG:   CTA-B. No wheezes, rales or rhonchi. Tachypnea. ABD:  Soft, ND/NT, BS+ x4. Without G/R.  EXT: 2+ pulses, no c/c/e. Brisk cap refill. PSY:  Thought process intact, affect appropriate. PABLO:  CN III-XII grossly intact. Moves all 4 spontaneously. Sensory grossly intact. SKIN: No rash or lesions on visible skin. Chart review shows recent radiographs:  XR CHEST PORTABLE    Result Date: 3/2/2023  EXAMINATION: ONE XRAY VIEW OF THE CHEST 3/2/2023 6:23 pm COMPARISON: 02/19/2023 HISTORY: ORDERING SYSTEM PROVIDED HISTORY: PAIN TECHNOLOGIST PROVIDED HISTORY: Short of breath and has a history of interstitial lung disease Reason for exam:->PAIN Reason for Exam: Shortness of Breath     sob FINDINGS: Lungs are under expanded. Patchy opacities are present bilaterally but this appears to be chronic. Aeration in the right lower lobe may have minimally worsened. No large pneumothorax. No definite pleural effusions. Extensive chronic lung changes.   Aeration of the right lower lobe may have slightly worsened. XR CHEST PORTABLE    Result Date: 2/19/2023  EXAMINATION: ONE XRAY VIEW OF THE CHEST 2/19/2023 9:41 pm COMPARISON: 01/17/2023 HISTORY: ORDERING SYSTEM PROVIDED HISTORY: shortness of breath TECHNOLOGIST PROVIDED HISTORY: Reason for exam:->shortness of breath Reason for Exam: sob FINDINGS: The cardiomediastinal silhouette is unchanged in appearance. Low lung volumes with diffuse coarse interstitial opacities again demonstrated, consistent with pulmonary fibrosis. No new airspace disease, evidence for pneumothorax or effusion identified. The osseous structures are unchanged in appearance. Chronic findings in the chest without acute airspace disease identified. CT CHEST PULMONARY EMBOLISM W CONTRAST    Result Date: 3/2/2023  EXAMINATION: CTA OF THE CHEST 3/2/2023 9:01 pm TECHNIQUE: CTA of the chest was performed after the administration of intravenous contrast.  Multiplanar reformatted images are provided for review. MIP images are provided for review. Automated exposure control, iterative reconstruction, and/or weight based adjustment of the mA/kV was utilized to reduce the radiation dose to as low as reasonably achievable. COMPARISON: Chest x-ray earlier the same day, CT chest dated 01/17/2023 HISTORY: ORDERING SYSTEM PROVIDED HISTORY: Shortness of breath. TECHNOLOGIST PROVIDED HISTORY: Reason for exam:->Shortness of breath. Decision Support Exception - unselect if not a suspected or confirmed emergency medical condition->Emergency Medical Condition (MA) Reason for Exam: Shortness of breath. FINDINGS: The evaluation is significantly limited by motion artifact and timing of the contrast. Pulmonary Arteries: No evidence of pulmonary embolism in the main and central pulmonary arteries, the more peripheral arteries is difficult to evaluate. Main pulmonary artery is normal in caliber. Mediastinum: No evidence of mediastinal lymphadenopathy.   The heart is enlarged. No pericardial effusion. There is no acute abnormality of the thoracic aorta. Lungs/pleura: Severe lung emphysema with bullous changes, bronchiectasis, and fibrotic changes. No focal consolidation or masses. No evidence of pleural effusion or pneumothorax. Upper Abdomen: Limited images of the upper abdomen are unremarkable. Soft Tissues/Bones: There is apparent step-off in the manubrium, and inferior sternum, new compared to prior exam, this could be an acute fracture versus artifactual related to patient motion. Erleen Tello Postsurgical changes in the lower thoracic and upper normal spine. Limited exam. No definite evidence of pulmonary embolism in the central pulmonary arteries. Severe emphysema with fibrotic changes, unchanged. No focal consolidations. No pleural effusion. Apparent step-off of the manubrium and inferior sternum could be an acute fracture versus artifactual related to patient's motion. Correlated with clinical exam and point tenderness, if indicated this could be further evaluated with sternal views. EKG:    EKG 12 Lead [[id number]]    Collected: 03/02/23 1745    Updated: 03/02/23 1819     Ventricular Rate 92 BPM    Atrial Rate 92 BPM    P-R Interval 148 ms    QRS Duration 76 ms    Q-T Interval 356 ms    QTc Calculation (Bazett) 440 ms    P Axis 2 degrees    R Axis -1 degrees    T Axis -11 degrees    Diagnosis Normal sinus rhythmVoltage criteria for left ventricular hypertrophyT wave abnormality, consider inferior ischemiaAbnormal ECGWhen compared with ECG of 21-FEB-2023 00:29,Premature supraventricular complexes are no longer Present   Do not appreciate acute injury pattern.      Intake/Output   03/02/23 0700 - 03/03/23 0659   Intake (ml) --   Output (ml) 200   Net (ml) -200   Last Weight 107 lb (48.5 kg)       Transthoracic Echocardiography Report (TTE)      Demographics      Patient Name       Alize ARAUJO      Date of Study      05/09/2022        Gender Female      Patient Number     [id number]        Date of Birth       1955      Visit Number       [id number]         Age                 69 year(s)      Accession Number   [id number]        Room Number               Corporate ID       [id number]          [address] [place] [address]      Ordering Physician [id number] [facility].                                        Physician           Brandy Corley MD, [facility]     Procedure     Type of Study      TTE procedure:ECHOCARDIOGRAM COMPLETE 2D W DOPPLER W COLOR. Procedure Date  Date: 05/09/2022 Start: 07:48 AM     Study Location: [address] [place] [address] - Echo Lab  Technical Quality: Limited visualization     Indications:Dyspnea/SOB. Patient Status: Routine     Height: 63 inches Weight: 155 pounds BSA: 1.74 m2 BMI: 27.46 kg/m2     BP: 148/77 mmHg      Conclusions      Summary   Technically difficult examination. Apical views are off axis secondary to pt   left ribs fracture. Left ventricular systolic function is normal with ejection fraction   estimated at 55%. No regional wall motion abnormalities. There is mild concentric left ventricular hypertrophy. Indeterminate left ventricular filling pressure. Systolic pulmonary artery pressure (SPAP) is normal estimated at 22 mmHg   (Right atrial pressure of 8 mmHg).       Signature      ------------------------------------------------------------------   Electronically signed by Brandy Corley MD, [facility]   (Interpreting physician) on 05/09/2022 at 11:39 AM   ------------------------------------------------------------------    CBC:  Recent Labs     03/02/23  1755   WBC 8.1   HGB 9.9*   HCT 31.3*         RENAL  Recent Labs     03/02/23  1755      K 4.1   CL 96*   CO2 38*   BUN 12   CREATININE <0.5*   GLUCOSE 115*     Hemoglobin a1c:  Lab Results   Component Value Date    LABA1C 5.3 02/21/2023    LABA1C 4.9 01/17/2023    LABA1C 5.4 11/29/2022     LFT'S:  Recent Labs     03/02/23  1755   AST 38*   ALT 28   BILITOT 0.3   ALKPHOS 93     COAG:  Recent Labs     03/02/23  1755   INR 1.08     CARDIAC ENZYMES:   Recent Labs     03/02/23  1755   TROPONINI <0.01     Lab Results   Component Value Date    PROBNP 179 (H) 03/02/2023    PROBNP 254 (H) 02/19/2023    PROBNP 553 (H) 01/16/2023     VBG:  Recent Labs     03/02/23 2000   PHVEN 7.397   UIS6QWF 62.7*   PHV7MJQ 37.7*   PO2VEN 51.3*   M0JZIIEA 85        PHYSICIAN CERTIFICATION  I certify that Mariann Obando is expected to be hospitalized for 2 midnights based on the following assessment and plan:    ASSESSMENT/PLAN:  # Acute on chronic respiratory failure with hypoxia  # ILD with  acute exacerbation  # Recent Hx cryptogenic organizing pneumonia  -Baseline 8L w/ ambulation and 3-6L at rest  -Requiring 8-10L at rest w/ desats on ambulation and with associated dyspnea and desats  -PCT added on  -CXR and CT non-acute  -IV solumedrol started   -PRN/QUOC IBD  -IV azithro  -Chk resp Cx     #PAF  -Cont diltiazem and nadolol  -Not on AC 2/2 due to recurrent epistaxis and chronic anemia   -Currently has heart monitor for work-up for watchman implant     #Diastolic CHF  -without apparent exacerbation  -last echo as above   -monitor I&Os and daily weights  -Only takes diuretic PRN. #Chronic anemia  -Hgb stable at 10; monitored CBC     #Type II DM  -reports no longer requiring meds 2/2 weight loss  -monitor POC glucose, SSI prn     #Hypothyroidism  -continue synthroid     #HLD  -continue statin     #Anxiety  -continue daily sertraline and prn ativan     DVT Prophylaxis: Lx  Diet: Gen  Code Status: Full Code   PT/OT Eval Status: Will order if needed and as patient condition allows  Dispo - Admit to inpatient     Do Tapia MD    Thank you Osiris Alfred MD for the opportunity to be involved in this patient's care.  If you have any questions or concerns please feel free to contact me via the [place] Answering Service at [phone]. This chart was generated using the [address] [place] [address] [address]  dictation system. I created this record but it may contain dictation errors given the limitations of this technology.

## 2023-03-03 NOTE — CARE COORDINATION
Case Management Assessment  Initial Evaluation    Date/Time of Evaluation: 3/3/2023 2:10 PM  Assessment Completed by: Byron Nguyen RN    If patient is discharged prior to next notation, then this note serves as note for discharge by case management. Patient Name: Jodie Obando                   YOB: 1955  Diagnosis: Acute on chronic respiratory failure with hypoxia and hypercapnia ([facility] [S69.31, J96.22]                   Date / Time: 3/2/2023  5:39 PM    Patient Admission Status: Inpatient   Readmission Risk (Low < 19, Mod (19-27), High > 27): Readmission Risk Score: 36    Current PCP: Nigel Mark MD  PCP verified by CM? (P) Yes Brenda Yanez MD)    Chart Reviewed: Yes      History Provided by: Spouse  Patient Orientation: Alert and Oriented, Person, Place, Situation    Patient Cognition: Alert    Hospitalization in the last 30 days (Readmission):  Yes    If yes, Readmission Assessment in CM Navigator will be completed.     Advance Directives:      Code Status: Full Code   Patient's Primary Decision Maker is: (P) Named in Scanned ACP Document    Primary Decision Maker: Daryl Acevedo - Spouse - [phone]    Secondary Decision Maker: Kylah - [phone]    Discharge Planning:    Patient lives with: (P) Spouse/Significant Other, Family Members Type of Home: (P) House  Primary Care Giver: Family  Patient Support Systems include: (P) Family Members, VAL[employer], Home Care Staff   Current Financial resources: (P) Medicaid, Medicare  Current community resources: (P) Health Education, Hospice  Current services prior to admission: (P) Durable Medical Equipment, Oxygen Therapy            Current DME: (P) Bedside Commode, Shower Chair, Oxygen Therapy (Comment) ([place] 4-8 liters (10 liter concentrator))            Type of Home Care services:  (P) Hospice    ADLS  Prior functional level: (P) Independent in ADLs/IADLs, Bathing, Toileting, Mobility  Current functional level: (P) Independent in ADLs/IADLs, Bathing, Toileting, Mobility    PT AM-PAC:   /24  OT AM-PAC:   /24    Family can provide assistance at DC: (P) Yes (daughter is paid caregiver through [employer] 25 hours per week)  Would you like Case Management to discuss the discharge plan with any other family members/significant others, and if so, who? (P) Yes (daughter Curly Mcadams and spouse, [place])  Plans to Return to Present Housing: (P) Yes  Other Identified Issues/Barriers to RETURNING to current housing:  recurrent readmissions, chronic debilitating illness, increased support needs at home  Potential Assistance needed at discharge: (P) Other (Comment) (hospice/palliative support)            Potential DME:    Patient expects to discharge to: (P) [address] [place] [address] for transportation at discharge:      Financial    Payor: [employer] MEDICARE / Plan: Judi Jessica / Product Type: *No Product type* /     Does insurance require precert for SNF: Yes    Potential assistance Purchasing Medications: (P) No  Meds-to-Beds request:        [id number] [place] [place] [facility] [address] [phone] - F [phone]  [address] [address]  Phone: [phone] Fax: [phone]    [id number] [phone] [address] [phone]  [address] [facility] [address]  Phone: [phone] Fax: [phone]    [facility] - [facility] [phone] [address] [phone]  [address] [facility] [address]  Phone: [phone] Fax: [phone]    [facility] [address], [address] [phone] - F [phone]  [address]  [address] [place] [address]  Phone: [phone] Fax: [phone]      Notes:    Factors facilitating achievement of predicted outcomes: Family support, Has needed Durable Medical Equipment at home, and Has homemaker services    Barriers to discharge: Limited participation, debility    Additional Case Management Notes: Chart reviewed. Readmission. Met with family and explained the role of the CM. Palliative consult noted. Recurrent admission debility, resp failure, COPD, CHF. Pt lives w/spouse. Spouse defers to daughter for medical decisions and to coordinate care in the home. Daughter provides personal care 4 days a week and is paid through [employer]. Recently pt's care needs have increased but [employer] is unable to provide additional caregiver hours or personnel. Daughter is reaching out for additional resources. Interested in an informational meeting with [facility] for mor info on palliative care and hospice care options. The Plan for Transition of Care is related to the following treatment goals of Acute on chronic respiratory failure with hypoxia and hypercapnia (HCC) [J96.21, H83.50]    IF APPLICABLE: The Patient and/or patient representative Carlos Liriano and her family were provided with a choice of provider and agrees with the discharge plan. Freedom of choice list with basic dialogue that supports the patient's individualized plan of care/goals and shares the quality data associated with the providers was provided to: (P) Patient Representative   Patient Representative Name: (P) Denisse Salgado     The Patient and/or Patient Representative Agree with the Discharge Plan?  (P) Yes    Mary Anne López RN  Case Management Department  Ph: [phone]

## 2023-03-03 NOTE — PROGRESS NOTES
Occupational Therapy  Attempted evaluation this afternoon. Patient reports feeling SOB and fatigued from finishing a bath. Requesting to hold therapy evaluation at this time. OT will follow up later this date or tomorrow as appropriate.        ALVARO JohnsonR/L #[id number]

## 2023-03-03 NOTE — PROGRESS NOTES
Patient supplied her own pirfenidone 267 mg tablets. Patient takes 2 tablets (534 mg total) twice daily. Patient supplied us 12 tablets of 267 mg.  Medication verified, bar-coded, and returned to the floor for administration and storing.

## 2023-03-03 NOTE — ED NOTES
Pt taken off of bedpan at this time. Stated she felt like she could go but unable to at this time.      Katty Valdez RN  03/02/23 2035

## 2023-03-03 NOTE — PROGRESS NOTES
Patient resting in bed, AM assessment completed. Lungs decreased. BS+. O2 at 5L per NC. IVF at 75/hr. No acute distress noted. Call light in reach. Bed check on. Will continue to monitor.

## 2023-03-03 NOTE — CONSULTS
Patient is being seen at the request of Dr. Marvel Sherwood for a consultation for COPD    HISTORY OF PRESENT ILLNESS: This is a 78-year-old female with a history of ILD on pirfenidone, pAFIB, & diastolic CHF, recurrent pneumothorax and chronic respiratory failure on 4 L home oxygen (8 L with exertion) who presented to the emergency department on 3/2/2023 with a 1 day history of increasing severe hypoxemia on her home 4 L and feeling generally unwell, no associated chest pain, increased cough or other concerning symptoms. She was recently admitted with similar symptoms 2/19/2023-2/22/2023. She is feeling better laying in bed. No c/o chest or sternal pain    PAST MEDICAL HISTORY:  Past Medical History:   Diagnosis Date    A-fib ([address]     Anxiety     COPD (chronic obstructive pulmonary disease) ([facility]     Cryptogenic organizing pneumonia (HCC)     Depression     Diastolic CHF ([facility]     From ECHO. GERD (gastroesophageal reflux disease)     Hyperlipidemia     On home oxygen therapy     uses O2 3L prn    Rash     Thyroid disease     hypothyroidism     PAST SURGICAL HISTORY:  Past Surgical History:   Procedure Laterality Date    ARM SURGERY Left 3/2/2020    LEFT ULNAR NERVE DECOMPRESSION AT THE ELBOW performed by Rae Lehman MD at [facility] N/A 6/27/2019    EBUS WF W/ANES.  performed by Marquis Radha MD at [address]  6/27/2019    BRONCHOSCOPY/TRANSBRONCHIAL NEEDLE BIOPSY performed by Marquis Radha MD at [address]  6/27/2019    BRONCHOSCOPY/TRANSBRONCHIAL LUNG BIOPSY performed by Marquis Radha MD at [address]  6/27/2019    BRONCHOSCOPY ALVEOLAR LAVAGE performed by Marquis Radha MD at [address]  07/10/2019    BRONCHOSCOPY N/A 7/10/2019    BRONCHOSCOPY ALVEOLAR LAVAGE performed by Jessica Alvarado MD at [address] Bilateral 08/06/2019    BRONCHOSCOPY N/A 8/6/2019    BRONCHOSCOPY ALVEOLAR LAVAGE performed by Diego Farmer MD at [facility] [address] N/A 12/24/2019    BRONCHOSCOPY ALVEOLAR LAVAGE performed by Nikkie Baeza MD at [facility] N/A 8/25/2022    COLONOSCOPY POLYPECTOMY SNARE/COLD BIOPSY performed by Rosalee Rivers MD at [address]  9/15/2022    CT GUIDED CHEST TUBE 9/15/2022 [facility] CT SCAN    CT INSERT CATH PLEURA W IMAGE  11/28/2022    CT INSERT CATH PLEURA W IMAGE 11/28/2022 Shelbi Almanzar MD [facility] CT SCAN    HYSTERECTOMY, TOTAL ABDOMINAL (CERVIX REMOVED)      partial     FAMILY HISTORY:  family history includes Asthma in her sister; Diabetes in her mother; High Blood Pressure in her mother; Other in her father. SOCIAL HISTORY:   reports that she has never smoked.  She has never used smokeless tobacco.    Scheduled Meds:   atorvastatin  40 mg Oral Daily    dilTIAZem  240 mg Oral Daily    levothyroxine  125 mcg Oral Daily    mirtazapine  30 mg Oral Nightly    nadolol  20 mg Oral Daily    therapeutic multivitamin-minerals  1 tablet Oral Daily    pantoprazole  40 mg Oral QAM AC    Pirfenidone  534 mg Oral BID WC    sertraline  200 mg Oral Daily    sodium chloride flush  10 mL IntraVENous 2 times per day    enoxaparin  30 mg SubCUTAneous Daily    azithromycin  500 mg IntraVENous Q24H    potassium chloride  20 mEq Oral Daily with breakfast    methylPREDNISolone  40 mg IntraVENous Q6H    Followed by    Phong Albarado ON 3/5/2023] predniSONE  40 mg Oral Daily with breakfast    insulin lispro  0-6 Units SubCUTAneous TID WC    insulin lispro  0-4 Units SubCUTAneous Nightly    ipratropium-albuterol  1 ampule Inhalation 4x daily     Continuous Infusions:   sodium chloride 75 mL/hr at 03/03/23 0116    sodium chloride      dextrose       PRN Meds:  benzonatate, guaiFENesin, hydrOXYzine HCl, LORazepam, traMADol, sodium chloride, sodium chloride flush, sodium chloride, ondansetron **OR** ondansetron, polyethylene glycol, acetaminophen **OR** acetaminophen, albuterol, glucose, dextrose bolus **OR** dextrose bolus, glucagon (rDNA), dextrose, ipratropium-albuterol    ALLERGIES:  Patient is allergic to penicillins, cefuroxime, doxycycline, imitrex [sumatriptan], meperidine, sulfa antibiotics, keflex [cephalexin], and tape [adhesive tape]. REVIEW OF SYSTEMS:  Constitutional: Negative for fever  HENT: Negative for sore throat  Eyes: Negative for redness   Respiratory: Chronic shortness of breath  Cardiovascular: Negative for chest pain  Gastrointestinal: Negative for vomiting, diarrhea   Genitourinary: Negative for hematuria   Musculoskeletal: Negative for arthralgias   Skin: Negative for rash  Neurological: Negative for syncope  Hematological: Negative for adenopathy  Psychiatric/Behavorial: Negative for anxiety    PHYSICAL EXAM:  Blood pressure 127/72, pulse 90, temperature 96.9 °F (36.1 °C), temperature source Oral, resp. rate 19, height 5' 3\" (1.6 m), weight 105 lb 6.4 oz (47.8 kg), SpO2 99 %, not currently breastfeeding.' on 7 L  Gen: No distress. Almost always laying on side   Eyes: PERRL. No sclera icterus. No conjunctival injection. ENT: No discharge. Pharynx clear. Neck: Trachea midline. No obvious mass. Resp: No accessory muscle use. ++ crackles. No wheezes. No rhonchi. No dullness on percussion. CV: Regular rate. Regular rhythm. No murmur or rub. No edema. Peripheral pulses are 2+. Capillary refill is less than 3 seconds. GI: Non-tender. Non-distended. No hernia. Skin: Warm and dry. No nodule on exposed extremities. Lymph: No cervical LAD. No supraclavicular LAD. M/S: No cyanosis. No joint deformity. No clubbing. Neuro: Awake. Alert. Moves all four extremities. Psych: Oriented x 3. No anxiety.      LABS:  CBC:   Recent Labs     03/02/23  1755 03/03/23  0502   WBC 8.1 5.9   HGB 9.9* 10.0*   HCT 31.3* 31.1*   MCV 79.2* 80.7    142     BMP:   Recent Labs     03/02/23  1755 03/03/23  0502   NA 141 139   K 4.1 3.9   CL 96* 99   CO2 38* 29   BUN 12 12   CREATININE <0.5* <0.5*     LIVER PROFILE:   Recent Labs     03/02/23  1755 03/03/23  0502   AST 38* 25   ALT 28 26   BILITOT 0.3 0.3   ALKPHOS 93 91     PT/INR:   Recent Labs     03/02/23  1755   PROTIME 13.9   INR 1.08     APTT: No results for input(s): APTT in the last 72 hours. UA:No results for input(s): NITRITE, COLORU, PHUR, LABCAST, WBCUA, RBCUA, MUCUS, TRICHOMONAS, YEAST, BACTERIA, CLARITYU, SPECGRAV, LEUKOCYTESUR, UROBILINOGEN, BILIRUBINUR, BLOODU, GLUCOSEU, AMORPHOUS in the last 72 hours. Invalid input(s): KETONESU  No results for input(s): PHART, ZUF1ALL, PO2ART in the last 72 hours. Microbiology:  3/2/2023 SARS-CoV-2 and rapid flu are negative  Chest imaging was reviewed by me and showed   CTPA 3/2/23 essentially unchanged from prior other than probable artifact of the sternum  Impression   Limited exam.       No definite evidence of pulmonary embolism in the central pulmonary arteries. Severe emphysema with fibrotic changes, unchanged. No focal consolidations. No pleural effusion.        Apparent step-off of the manubrium and inferior sternum could be an acute   fracture versus artifactual related to patient's motion       ASSESSMENT:  Acute on chronic life threatening hypoxemic and hypercapnic respiratory failure, typically on 4 L at rest, 8 L with exertion   ILD: NSIP/organizing pneumonia with acute exacerbation  Paroxysmal A-fib  Diastolic CHF  H/O PTX  No history of tobacco use    PLAN:  Supplemental oxygen for life-threatening respiratory failure to maintain SaO2 >92%; wean as tolerated    Supplemental oxygen to maintain SaO2 >92%; wean as tolerated    Decrease Solu-Medrol to daily  Continue pirfenidone  Azithromycin day #2 of 3  Inhaled bronchodilators   Has met with palliative care,  on prior admissions, remains full code  D/C IVF   PT/OT, OOB     I spent a total of 22 minutes on this encounter personally obtaining the patient history, reviewing labs, imaging and other data. I independently performed the above physical exam and updated this encounter note, assessment and plan as needed. Lizabeth Vieira MD on 3/3/23 at 1:49 PM EST    I spent a total of 5 minutes on this encounter on chart review, history taking and review of data. I updated this encounter note as needed.    Roge Ramos PA-C on 3/3/23 at 12:40 PM EST

## 2023-03-04 LAB
ESTIMATED AVERAGE GLUCOSE: 102.5 MG/DL
GLUCOSE BLD-MCNC: 112 MG/DL (ref 70–99)
GLUCOSE BLD-MCNC: 116 MG/DL (ref 70–99)
GLUCOSE BLD-MCNC: 157 MG/DL (ref 70–99)
GLUCOSE BLD-MCNC: 196 MG/DL (ref 70–99)
HBA1C MFR BLD: 5.2 %
PERFORMED ON: ABNORMAL

## 2023-03-04 PROCEDURE — 6370000000 HC RX 637 (ALT 250 FOR IP): Performed by: INTERNAL MEDICINE

## 2023-03-04 PROCEDURE — 6360000002 HC RX W HCPCS: Performed by: INTERNAL MEDICINE

## 2023-03-04 PROCEDURE — 94640 AIRWAY INHALATION TREATMENT: CPT

## 2023-03-04 PROCEDURE — 2580000003 HC RX 258: Performed by: INTERNAL MEDICINE

## 2023-03-04 PROCEDURE — 2060000000 HC ICU INTERMEDIATE R&B

## 2023-03-04 PROCEDURE — 94761 N-INVAS EAR/PLS OXIMETRY MLT: CPT

## 2023-03-04 PROCEDURE — 2700000000 HC OXYGEN THERAPY PER DAY

## 2023-03-04 PROCEDURE — 99233 SBSQ HOSP IP/OBS HIGH 50: CPT | Performed by: INTERNAL MEDICINE

## 2023-03-04 RX ORDER — PREDNISONE 20 MG/1
40 TABLET ORAL DAILY
Status: DISCONTINUED | OUTPATIENT
Start: 2023-03-05 | End: 2023-03-05 | Stop reason: HOSPADM

## 2023-03-04 RX ORDER — IPRATROPIUM BROMIDE AND ALBUTEROL SULFATE 2.5; .5 MG/3ML; MG/3ML
1 SOLUTION RESPIRATORY (INHALATION) 2 TIMES DAILY
Status: DISCONTINUED | OUTPATIENT
Start: 2023-03-04 | End: 2023-03-05 | Stop reason: HOSPADM

## 2023-03-04 RX ADMIN — DILTIAZEM HYDROCHLORIDE 240 MG: 240 CAPSULE, COATED, EXTENDED RELEASE ORAL at 08:02

## 2023-03-04 RX ADMIN — PIRFENIDONE 534 MG: 267 TABLET, FILM COATED ORAL at 08:01

## 2023-03-04 RX ADMIN — METHYLPREDNISOLONE SODIUM SUCCINATE 40 MG: 40 INJECTION, POWDER, FOR SOLUTION INTRAMUSCULAR; INTRAVENOUS at 08:03

## 2023-03-04 RX ADMIN — IPRATROPIUM BROMIDE AND ALBUTEROL SULFATE 1 AMPULE: 2.5; .5 SOLUTION RESPIRATORY (INHALATION) at 08:29

## 2023-03-04 RX ADMIN — AZITHROMYCIN DIHYDRATE 500 MG: 500 INJECTION, POWDER, LYOPHILIZED, FOR SOLUTION INTRAVENOUS at 00:46

## 2023-03-04 RX ADMIN — NADOLOL 20 MG: 40 TABLET ORAL at 08:01

## 2023-03-04 RX ADMIN — SERTRALINE 200 MG: 100 TABLET, FILM COATED ORAL at 08:02

## 2023-03-04 RX ADMIN — IPRATROPIUM BROMIDE AND ALBUTEROL SULFATE 1 AMPULE: 2.5; .5 SOLUTION RESPIRATORY (INHALATION) at 19:21

## 2023-03-04 RX ADMIN — Medication 10 ML: at 21:19

## 2023-03-04 RX ADMIN — MULTIPLE VITAMINS W/ MINERALS TAB 1 TABLET: TAB at 08:02

## 2023-03-04 RX ADMIN — POTASSIUM CHLORIDE 20 MEQ: 1500 TABLET, EXTENDED RELEASE ORAL at 08:03

## 2023-03-04 RX ADMIN — BENZONATATE 100 MG: 100 CAPSULE ORAL at 21:18

## 2023-03-04 RX ADMIN — PIRFENIDONE 534 MG: 267 TABLET, FILM COATED ORAL at 16:39

## 2023-03-04 RX ADMIN — LEVOTHYROXINE SODIUM 125 MCG: 125 TABLET ORAL at 06:01

## 2023-03-04 RX ADMIN — ATORVASTATIN CALCIUM 40 MG: 40 TABLET, FILM COATED ORAL at 08:02

## 2023-03-04 RX ADMIN — BENZONATATE 100 MG: 100 CAPSULE ORAL at 08:11

## 2023-03-04 RX ADMIN — ONDANSETRON 4 MG: 4 TABLET, ORALLY DISINTEGRATING ORAL at 09:10

## 2023-03-04 RX ADMIN — ENOXAPARIN SODIUM 30 MG: 100 INJECTION SUBCUTANEOUS at 08:03

## 2023-03-04 RX ADMIN — Medication 10 ML: at 08:03

## 2023-03-04 RX ADMIN — MIRTAZAPINE 30 MG: 30 TABLET, FILM COATED ORAL at 21:18

## 2023-03-04 RX ADMIN — BENZONATATE 100 MG: 100 CAPSULE ORAL at 00:02

## 2023-03-04 RX ADMIN — PANTOPRAZOLE SODIUM 40 MG: 40 TABLET, DELAYED RELEASE ORAL at 06:00

## 2023-03-04 NOTE — PROGRESS NOTES
Patient is being seen at the request of Dr. Magaly Evans for a consultation for COPD    HISTORY OF PRESENT ILLNESS: This is a 58-year-old female with a history of ILD on pirfenidone, pAFIB, & diastolic CHF, recurrent pneumothorax and chronic respiratory failure on 4 L home oxygen (8 L with exertion) who presented to the emergency department on 3/2/2023 with a 1 day history of increasing severe hypoxemia on her home 4 L and feeling generally unwell, no associated chest pain, increased cough or other concerning symptoms. She was recently admitted with similar symptoms 2/19/2023-2/22/2023. She is feeling better laying in bed. No c/o chest or sternal pain  Pulmonary Progress Note  CC: worsening hypoxemia    Subjective:  feels okay, not ready to go home    IV line peripheral     EXAM:   /68   Pulse 90   Temp 98.4 °F (36.9 °C) (Oral)   Resp 18   Ht 5' 3\" (1.6 m)   Wt 104 lb 9.6 oz (47.4 kg)   SpO2 99%   BMI 18.53 kg/m²  on 4L   Constitutional:  No acute distress   HEENT: no scleral icterus  Neck: No tracheal deviation present. Cardiovascular: Normal heart sounds. Pulmonary/Chest: No wheezes. No rhonchi. + rales. No decreased breath sounds. No accessory muscle usage or stridor. Abdominal: Soft. Musculoskeletal: No cyanosis. No clubbing. Skin: Skin is warm and dry.      Scheduled Meds:   ipratropium-albuterol  1 ampule Inhalation BID    atorvastatin  40 mg Oral Daily    dilTIAZem  240 mg Oral Daily    levothyroxine  125 mcg Oral Daily    mirtazapine  30 mg Oral Nightly    nadolol  20 mg Oral Daily    therapeutic multivitamin-minerals  1 tablet Oral Daily    pantoprazole  40 mg Oral QAM AC    Pirfenidone  534 mg Oral BID WC    sertraline  200 mg Oral Daily    sodium chloride flush  10 mL IntraVENous 2 times per day    enoxaparin  30 mg SubCUTAneous Daily    azithromycin  500 mg IntraVENous Q24H    potassium chloride  20 mEq Oral Daily with breakfast    insulin lispro  0-6 Units SubCUTAneous TID WC insulin lispro  0-4 Units SubCUTAneous Nightly    methylPREDNISolone  40 mg IntraVENous Daily     Continuous Infusions:   sodium chloride      dextrose       PRN Meds:  benzonatate, guaiFENesin, hydrOXYzine HCl, LORazepam, traMADol, sodium chloride, sodium chloride flush, sodium chloride, ondansetron **OR** ondansetron, polyethylene glycol, acetaminophen **OR** acetaminophen, albuterol, glucose, dextrose bolus **OR** dextrose bolus, glucagon (rDNA), dextrose, ipratropium-albuterol    Labs:  CBC:   Recent Labs     03/02/23  1755 03/03/23  0502   WBC 8.1 5.9   HGB 9.9* 10.0*   HCT 31.3* 31.1*   MCV 79.2* 80.7    142     BMP:   Recent Labs     03/02/23  1755 03/03/23  0502    139   K 4.1 3.9   CL 96* 99   CO2 38* 29   BUN 12 12   CREATININE <0.5* <0.5*     Microbiology:  3/2/2023 SARS-CoV-2 and rapid flu are negative    Imaging:   CTPA 3/2/23 essentially unchanged from prior other than probable artifact of the sternum  Impression   Limited exam.       No definite evidence of pulmonary embolism in the central pulmonary arteries. Severe emphysema with fibrotic changes, unchanged. No focal consolidations. No pleural effusion. Apparent step-off of the manubrium and inferior sternum could be an acute   fracture versus artifactual related to patient's motion       ASSESSMENT:  Acute on chronic life threatening hypoxemic and hypercapnic respiratory failure, typically on 4 L at rest, 8 L with exertion   ILD: NSIP/organizing pneumonia with acute exacerbation  Paroxysmal A-fib  Diastolic CHF  H/O PTX  No history of tobacco use    PLAN:  Supplemental oxygen for life-threatening respiratory failure to maintain SaO2 >92%; wean as tolerated    Supplemental oxygen to maintain SaO2 >92%; wean as tolerated    Solumedrol daily - plan prednisone 40X5, 30X5, 20X5, then 10 daily.   She worsened with stopping prednisone prior to this admission   Continue pirfenidone  Azithromycin D#2/3  Inhaled bronchodilators D/C IVF   PT/OT, OOB   Full code  Possibly home tomorrow, I d/w Dr. Merlinda Jaeger

## 2023-03-04 NOTE — PROGRESS NOTES
Progress Note    Admit Date:  3/2/2023    Subjective:  Ms. Molly Pringle is feeling some better. She is on 4 L of oxygen at home at rest and 8 L with exertion. No chest pain. She has history of ILD and CHF. Objective:   /68   Pulse 90   Temp 98.4 °F (36.9 °C) (Oral)   Resp 18   Ht 5' 3\" (1.6 m)   Wt 104 lb 9.6 oz (47.4 kg)   SpO2 99%   BMI 18.53 kg/m²        Intake/Output Summary (Last 24 hours) at 3/4/2023 1026  Last data filed at 3/3/2023 1704  Gross per 24 hour   Intake 120 ml   Output 1 ml   Net 119 ml       Physical Exam:    General appearance: alert, appears stated age and cooperative. Chronically ill appearing. Head: Normocephalic, without obvious abnormality, atraumatic  Eyes: conjunctivae/corneas clear. PERRL, EOM's intact. Neck: no adenopathy, no carotid bruit, no JVD, supple, symmetrical, trachea midline and thyroid not enlarged, symmetric, no tenderness/mass/nodules  Lungs: coarse crackles both lungs, no accessory muscle use.    Heart: regular rate and rhythm, S1, S2 normal, no murmur, click, rub or gallop  Abdomen: soft, non-tender; bowel sounds normal; no masses,  no organomegaly  Extremities: extremities normal, atraumatic, no cyanosis or edema  Pulses: 2+ and symmetric  Skin: Skin color, texture, turgor normal. No rashes or lesions  Neurologic: Grossly normal    Scheduled Meds:   atorvastatin  40 mg Oral Daily    dilTIAZem  240 mg Oral Daily    levothyroxine  125 mcg Oral Daily    mirtazapine  30 mg Oral Nightly    nadolol  20 mg Oral Daily    therapeutic multivitamin-minerals  1 tablet Oral Daily    pantoprazole  40 mg Oral QAM AC    Pirfenidone  534 mg Oral BID WC    sertraline  200 mg Oral Daily    sodium chloride flush  10 mL IntraVENous 2 times per day    enoxaparin  30 mg SubCUTAneous Daily    azithromycin  500 mg IntraVENous Q24H    potassium chloride  20 mEq Oral Daily with breakfast    insulin lispro  0-6 Units SubCUTAneous TID WC    insulin lispro  0-4 Units SubCUTAneous Nightly    ipratropium-albuterol  1 ampule Inhalation 4x daily    methylPREDNISolone  40 mg IntraVENous Daily       Continuous Infusions:   sodium chloride      dextrose         PRN Meds:  benzonatate, guaiFENesin, hydrOXYzine HCl, LORazepam, traMADol, sodium chloride, sodium chloride flush, sodium chloride, ondansetron **OR** ondansetron, polyethylene glycol, acetaminophen **OR** acetaminophen, albuterol, glucose, dextrose bolus **OR** dextrose bolus, glucagon (rDNA), dextrose, ipratropium-albuterol      Data:  CBC:   Recent Labs     03/02/23 1755 03/03/23  0502   WBC 8.1 5.9   HGB 9.9* 10.0*   HCT 31.3* 31.1*   MCV 79.2* 80.7    142     BMP:   Recent Labs     03/02/23 1755 03/03/23  0502    139   K 4.1 3.9   CL 96* 99   CO2 38* 29   BUN 12 12   CREATININE <0.5* <0.5*     LIVER PROFILE:   Recent Labs     03/02/23 1755 03/03/23  0502   AST 38* 25   ALT 28 26   BILITOT 0.3 0.3   ALKPHOS 93 91     PT/INR:   Recent Labs     03/02/23 1755   PROTIME 13.9   INR 1.08     Cultures:   COVID and Flu neg    CT CHEST PULMONARY EMBOLISM W CONTRAST   Final Result   Limited exam.      No definite evidence of pulmonary embolism in the central pulmonary arteries. Severe emphysema with fibrotic changes, unchanged. No focal consolidations. No pleural effusion. Apparent step-off of the manubrium and inferior sternum could be an acute   fracture versus artifactual related to patient's motion. Correlated with   clinical exam and point tenderness, if indicated this could be further   evaluated with sternal views. XR CHEST PORTABLE   Final Result   Extensive chronic lung changes. Aeration of the right lower lobe may have   slightly worsened. Assessment:  Principal Problem:    Acute on chronic respiratory failure with hypoxia and hypercapnia (HCC)  Resolved Problems:    * No resolved hospital problems.  *      Plan:    # Acute on chronic respiratory failure with hypoxia  # ILD with  acute exacerbation  # Recent Hx cryptogenic organizing pneumonia  -Baseline 8L w/ ambulation and 4 L at rest  -Requiring 8-10L at rest w/ desats on ambulation and with associated dyspnea and desats. Improved today. Lower oxygen. -PCT 0.07. -CXR and CT non-acute  -IV solumedrol started   -PRN/QUOC IBD  -IV azithro  -Chk resp Cx      #PAF  -Cont diltiazem and nadolol  -Not on AC 2/2 due to recurrent epistaxis and chronic anemia   -Currently has heart monitor for work-up for watchman implant     #Diastolic CHF  -without apparent exacerbation  -last echo as above   -monitor I&Os and daily weights  -Only takes diuretic PRN. #Chronic anemia  -Hgb stable at 10; monitored CBC     #Type II DM  -reports no longer requiring meds 2/2 weight loss  -monitor POC glucose, SSI prn     #Hypothyroidism  -continue synthroid     #HLD  -continue statin     #Anxiety  -continue daily sertraline and prn ativan    #Severe PCM     DVT Prophylaxis: Lx  Diet: Gen  Code Status: Full Code   PT/OT Eval Status:  Will order if needed and as patient condition allows  Dispo - Admit to inpatient     Lupillo Stokes MD, MD 3/4/2023 10:26 AM

## 2023-03-04 NOTE — PROGRESS NOTES
Patient met with [facility], . Patient denies any questions or concerns at this time. Call light within reach.

## 2023-03-04 NOTE — PROGRESS NOTES
03/03/23 2000   RT Protocol   History Pulmonary Disease 2   Respiratory pattern 2   Breath sounds 2   Cough 0   Indications for Bronchodilator Therapy Decreased or absent breath sounds   Bronchodilator Assessment Score 6   RT Inhaler-Nebulizer Bronchodilator Protocol Note    There is a bronchodilator order in the chart from a provider indicating to follow the RT Bronchodilator Protocol and there is an Initiate RT Inhaler-Nebulizer Bronchodilator Protocol order as well (see protocol at bottom of note). CXR Findings:  XR CHEST PORTABLE    Result Date: 3/2/2023  Extensive chronic lung changes. Aeration of the right lower lobe may have slightly worsened. The findings from the last RT Protocol Assessment were as follows:   History Pulmonary Disease: Chronic pulmonary disease  Respiratory Pattern: Dyspnea on exertion or RR 21-25 bpm  Breath Sounds: Slightly diminished and/or crackles  Cough: Strong, spontaneous, non-productive  Indication for Bronchodilator Therapy: Decreased or absent breath sounds  Bronchodilator Assessment Score: 6    Aerosolized bronchodilator medication orders have been revised according to the RT Inhaler-Nebulizer Bronchodilator Protocol below. Respiratory Therapist to perform RT Therapy Protocol Assessment initially then follow the protocol. Repeat RT Therapy Protocol Assessment PRN for score 0-3 or on second treatment, BID, and PRN for scores above 3. No Indications - adjust the frequency to every 6 hours PRN wheezing or bronchospasm, if no treatments needed after 48 hours then discontinue using Per Protocol order mode. If indication present, adjust the RT bronchodilator orders based on the Bronchodilator Assessment Score as indicated below.   Use Inhaler orders unless patient has one or more of the following: on home nebulizer, not able to hold breath for 10 seconds, is not alert and oriented, cannot activate and use MDI correctly, or respiratory rate 25 breaths per minute or more, then use the equivalent nebulizer order(s) with same Frequency and PRN reasons based on the score. If a patient is on this medication at home then do not decrease Frequency below that used at home. 0-3 - enter or revise RT bronchodilator order(s) to equivalent RT Bronchodilator order with Frequency of every 4 hours PRN for wheezing or increased work of breathing using Per Protocol order mode. 4-6 - enter or revise RT Bronchodilator order(s) to two equivalent RT bronchodilator orders with one order with BID Frequency and one order with Frequency of every 4 hours PRN wheezing or increased work of breathing using Per Protocol order mode. 7-10 - enter or revise RT Bronchodilator order(s) to two equivalent RT bronchodilator orders with one order with TID Frequency and one order with Frequency of every 4 hours PRN wheezing or increased work of breathing using Per Protocol order mode. 11-13 - enter or revise RT Bronchodilator order(s) to one equivalent RT bronchodilator order with QID Frequency and an Albuterol order with Frequency of every 4 hours PRN wheezing or increased work of breathing using Per Protocol order mode. Greater than 13 - enter or revise RT Bronchodilator order(s) to one equivalent RT bronchodilator order with every 4 hours Frequency and an Albuterol order with Frequency of every 2 hours PRN wheezing or increased work of breathing using Per Protocol order mode.        Electronically signed by Edgardo Adams RCP on 3/3/2023 at 8:42 PM

## 2023-03-04 NOTE — PROGRESS NOTES
Physical/Occupational Therapy  Per CM,and medical record, patient meeting with hospice this afternoon. Plan to follow-up after this.     Jony Fisher, PT #[id number]   Jazmni Roderick, OTR/L [id number]  Hortencia Parish 14:41

## 2023-03-04 NOTE — PROGRESS NOTES
Patient assessed and AM medications given. Patient took pills in yogurt. Reports no Pain. A/O. Patient denies no further needs at this time. Call light within reach.

## 2023-03-04 NOTE — PROGRESS NOTES
Physical /Occupational Therapy  Attempted PT/OT evaluation. Upon entry to room,patient stated that her stomach was hurting, and she would like nausea medication. RN contacted and made aware of this. Will re-attempt as patient condition allows.     Nathalie Martinez, PT #[id number]   Carlito Herring, OTR/L [id number]  No charge @8:50

## 2023-03-04 NOTE — PROGRESS NOTES
Pt is lying in bed with their eyes closed. Respirations are easy and even. Call light within reach bed in lowest position with the wheels locked. Will continue to monitor.  Brian Allen RN

## 2023-03-04 NOTE — FLOWSHEET NOTE
03/03/23 1935   Vital Signs   Temp 98.9 °F (37.2 °C)   Temp Source Oral   Heart Rate 92   Heart Rate Source Monitor   Resp 18   /67   MAP (Calculated) 85   BP Method Automatic   Patient Position Semi fowlers   Level of Consciousness 0   MEWS Score 1   Oxygen Therapy   SpO2 96 %   O2 Device High flow nasal cannula   O2 Flow Rate (L/min) 8 L/min   Pt assessment complete. Pt lying in bed quietly. Lung sounds diminished. Pt on 02 8 liters via high flow nasal canula. Pt NSR per monitor with HR of 92. Nightly medications given. Denies needs. Call light within reach. Bed exit alarm on.

## 2023-03-04 NOTE — PROGRESS NOTES
Bedside report given to [facility] for transfer of care. Patient on 8L NC. Patient denies any needs, eating mashed potatoes at this time.

## 2023-03-04 NOTE — PROGRESS NOTES
RT Inhaler-Nebulizer Bronchodilator Protocol Note    There is a bronchodilator order in the chart from a provider indicating to follow the RT Bronchodilator Protocol and there is an Initiate RT Inhaler-Nebulizer Bronchodilator Protocol order as well (see protocol at bottom of note). CXR Findings:  XR CHEST PORTABLE    Result Date: 3/2/2023  Extensive chronic lung changes. Aeration of the right lower lobe may have slightly worsened. The findings from the last RT Protocol Assessment were as follows:   History Pulmonary Disease: (P) Chronic pulmonary disease  Respiratory Pattern: (P) Dyspnea on exertion or RR 21-25 bpm  Breath Sounds: (P) Slightly diminished and/or crackles  Cough: (P) Strong, spontaneous, non-productive  Indication for Bronchodilator Therapy: (P) Decreased or absent breath sounds  Bronchodilator Assessment Score: (P) 6    Aerosolized bronchodilator medication orders have been revised according to the RT Inhaler-Nebulizer Bronchodilator Protocol below. Respiratory Therapist to perform RT Therapy Protocol Assessment initially then follow the protocol. Repeat RT Therapy Protocol Assessment PRN for score 0-3 or on second treatment, BID, and PRN for scores above 3. No Indications - adjust the frequency to every 6 hours PRN wheezing or bronchospasm, if no treatments needed after 48 hours then discontinue using Per Protocol order mode. If indication present, adjust the RT bronchodilator orders based on the Bronchodilator Assessment Score as indicated below. Use Inhaler orders unless patient has one or more of the following: on home nebulizer, not able to hold breath for 10 seconds, is not alert and oriented, cannot activate and use MDI correctly, or respiratory rate 25 breaths per minute or more, then use the equivalent nebulizer order(s) with same Frequency and PRN reasons based on the score.   If a patient is on this medication at home then do not decrease Frequency below that used at home. 0-3 - enter or revise RT bronchodilator order(s) to equivalent RT Bronchodilator order with Frequency of every 4 hours PRN for wheezing or increased work of breathing using Per Protocol order mode. 4-6 - enter or revise RT Bronchodilator order(s) to two equivalent RT bronchodilator orders with one order with BID Frequency and one order with Frequency of every 4 hours PRN wheezing or increased work of breathing using Per Protocol order mode. 7-10 - enter or revise RT Bronchodilator order(s) to two equivalent RT bronchodilator orders with one order with TID Frequency and one order with Frequency of every 4 hours PRN wheezing or increased work of breathing using Per Protocol order mode. 11-13 - enter or revise RT Bronchodilator order(s) to one equivalent RT bronchodilator order with QID Frequency and an Albuterol order with Frequency of every 4 hours PRN wheezing or increased work of breathing using Per Protocol order mode. Greater than 13 - enter or revise RT Bronchodilator order(s) to one equivalent RT bronchodilator order with every 4 hours Frequency and an Albuterol order with Frequency of every 2 hours PRN wheezing or increased work of breathing using Per Protocol order mode. RT to enter RT Home Evaluation for COPD & MDI Assessment order using Per Protocol order mode.     Electronically signed by Juan Antonio Jacobs RCP on 3/4/2023 at 11:10 AM

## 2023-03-04 NOTE — CARE COORDINATION
Rec'd call from [id number] with Emmanuel Velasco who states that she has spoken with pt family and will meet with pt and pt dtr today at 1400. CM following.

## 2023-03-05 VITALS
SYSTOLIC BLOOD PRESSURE: 132 MMHG | HEART RATE: 93 BPM | RESPIRATION RATE: 20 BRPM | TEMPERATURE: 97.3 F | OXYGEN SATURATION: 100 % | BODY MASS INDEX: 18.57 KG/M2 | DIASTOLIC BLOOD PRESSURE: 79 MMHG | HEIGHT: 63 IN | WEIGHT: 104.8 LBS

## 2023-03-05 LAB
GLUCOSE BLD-MCNC: 100 MG/DL (ref 70–99)
GLUCOSE BLD-MCNC: 124 MG/DL (ref 70–99)
PERFORMED ON: ABNORMAL
PERFORMED ON: ABNORMAL

## 2023-03-05 PROCEDURE — 6370000000 HC RX 637 (ALT 250 FOR IP): Performed by: INTERNAL MEDICINE

## 2023-03-05 PROCEDURE — 94761 N-INVAS EAR/PLS OXIMETRY MLT: CPT

## 2023-03-05 PROCEDURE — 94640 AIRWAY INHALATION TREATMENT: CPT

## 2023-03-05 PROCEDURE — 2700000000 HC OXYGEN THERAPY PER DAY

## 2023-03-05 PROCEDURE — 6360000002 HC RX W HCPCS: Performed by: INTERNAL MEDICINE

## 2023-03-05 PROCEDURE — 2580000003 HC RX 258: Performed by: INTERNAL MEDICINE

## 2023-03-05 PROCEDURE — 99238 HOSP IP/OBS DSCHRG MGMT 30/<: CPT | Performed by: INTERNAL MEDICINE

## 2023-03-05 RX ORDER — PREDNISONE 10 MG/1
TABLET ORAL
Qty: 75 TABLET | Refills: 0 | Status: ON HOLD | OUTPATIENT
Start: 2023-03-05 | End: 2023-03-28 | Stop reason: HOSPADM

## 2023-03-05 RX ADMIN — POTASSIUM CHLORIDE 20 MEQ: 1500 TABLET, EXTENDED RELEASE ORAL at 08:38

## 2023-03-05 RX ADMIN — PIRFENIDONE 534 MG: 267 TABLET, FILM COATED ORAL at 08:41

## 2023-03-05 RX ADMIN — ONDANSETRON 4 MG: 4 TABLET, ORALLY DISINTEGRATING ORAL at 11:10

## 2023-03-05 RX ADMIN — LEVOTHYROXINE SODIUM 125 MCG: 125 TABLET ORAL at 06:52

## 2023-03-05 RX ADMIN — PANTOPRAZOLE SODIUM 40 MG: 40 TABLET, DELAYED RELEASE ORAL at 06:52

## 2023-03-05 RX ADMIN — NADOLOL 20 MG: 40 TABLET ORAL at 08:38

## 2023-03-05 RX ADMIN — DILTIAZEM HYDROCHLORIDE 240 MG: 240 CAPSULE, COATED, EXTENDED RELEASE ORAL at 08:38

## 2023-03-05 RX ADMIN — ENOXAPARIN SODIUM 30 MG: 100 INJECTION SUBCUTANEOUS at 08:38

## 2023-03-05 RX ADMIN — MULTIPLE VITAMINS W/ MINERALS TAB 1 TABLET: TAB at 08:38

## 2023-03-05 RX ADMIN — AZITHROMYCIN DIHYDRATE 500 MG: 500 INJECTION, POWDER, LYOPHILIZED, FOR SOLUTION INTRAVENOUS at 00:59

## 2023-03-05 RX ADMIN — ATORVASTATIN CALCIUM 40 MG: 40 TABLET, FILM COATED ORAL at 08:38

## 2023-03-05 RX ADMIN — SERTRALINE 200 MG: 100 TABLET, FILM COATED ORAL at 08:39

## 2023-03-05 RX ADMIN — PREDNISONE 40 MG: 20 TABLET ORAL at 08:38

## 2023-03-05 RX ADMIN — IPRATROPIUM BROMIDE AND ALBUTEROL SULFATE 1 AMPULE: 2.5; .5 SOLUTION RESPIRATORY (INHALATION) at 07:58

## 2023-03-05 NOTE — PROGRESS NOTES
Pt is lying in bed with their eyes closed. Respirations are easy and even. Call light within reach bed in lowest position with the wheels locked. Will continue to monitor.  Bebeto Sun RN

## 2023-03-05 NOTE — PROGRESS NOTES
Patient assessed and AM medications given. Patient will be discharging today after seem by Pulm. Requested bath. PCA notified patient would like bath and change of clothes before discharge. Patient denies any further needs at this time. Call light within reach.

## 2023-03-05 NOTE — PROGRESS NOTES
Pulmonary Progress Note  CC: worsening hypoxemia    Subjective:    She feels the best she has in a long time and is eager for home. Met with hospice yesterday with goal of discussing having more help at home. IV line peripheral     EXAM:   BP (!) 152/90   Pulse 81   Temp 96.8 °F (36 °C)   Resp 20   Ht 5' 3\" (1.6 m)   Wt 104 lb 12.8 oz (47.5 kg)   SpO2 100%   BMI 18.56 kg/m²  on 4L   Constitutional:  No acute distress. Appears significantly improved. HEENT: no scleral icterus  Neck: No tracheal deviation present. Cardiovascular: Normal heart sounds. Pulmonary/Chest: No wheezes. No rhonchi. + rales. No decreased breath sounds. No accessory muscle usage or stridor. Abdominal: Soft. Musculoskeletal: No cyanosis. No clubbing. Skin: Skin is warm and dry.      Scheduled Meds:   ipratropium-albuterol  1 ampule Inhalation BID    predniSONE  40 mg Oral Daily    atorvastatin  40 mg Oral Daily    dilTIAZem  240 mg Oral Daily    levothyroxine  125 mcg Oral Daily    mirtazapine  30 mg Oral Nightly    nadolol  20 mg Oral Daily    therapeutic multivitamin-minerals  1 tablet Oral Daily    pantoprazole  40 mg Oral QAM AC    Pirfenidone  534 mg Oral BID WC    sertraline  200 mg Oral Daily    sodium chloride flush  10 mL IntraVENous 2 times per day    enoxaparin  30 mg SubCUTAneous Daily    azithromycin  500 mg IntraVENous Q24H    potassium chloride  20 mEq Oral Daily with breakfast    insulin lispro  0-6 Units SubCUTAneous TID WC    insulin lispro  0-4 Units SubCUTAneous Nightly     Continuous Infusions:   sodium chloride      dextrose       PRN Meds:  benzonatate, guaiFENesin, hydrOXYzine HCl, LORazepam, traMADol, sodium chloride, sodium chloride flush, sodium chloride, ondansetron **OR** ondansetron, polyethylene glycol, acetaminophen **OR** acetaminophen, albuterol, glucose, dextrose bolus **OR** dextrose bolus, glucagon (rDNA), dextrose, ipratropium-albuterol    Labs:  CBC:   Recent Labs 03/02/23  1755 03/03/23  0502   WBC 8.1 5.9   HGB 9.9* 10.0*   HCT 31.3* 31.1*   MCV 79.2* 80.7    142     BMP:   Recent Labs     03/02/23  1755 03/03/23  0502    139   K 4.1 3.9   CL 96* 99   CO2 38* 29   BUN 12 12   CREATININE <0.5* <0.5*     Microbiology:  3/2/2023 SARS-CoV-2 and rapid flu are negative    Imaging:   CTPA 3/2/23 essentially unchanged from prior other than probable artifact of the sternum  Impression   Limited exam.       No definite evidence of pulmonary embolism in the central pulmonary arteries. Severe emphysema with fibrotic changes, unchanged. No focal consolidations. No pleural effusion. Apparent step-off of the manubrium and inferior sternum could be an acute   fracture versus artifactual related to patient's motion       ASSESSMENT:  Acute on chronic life threatening hypoxemic and hypercapnic respiratory failure, typically on 4 L at rest, 8 L with exertion   ILD: NSIP/organizing pneumonia with acute exacerbation  Paroxysmal A-fib  Diastolic CHF  H/O PTX  No history of tobacco use    PLAN:  Supplemental O2 to maintain SaO2 >92%; wean as tolerated    Prednisone taper, plan 40X5, 30X5, 20X5, then 10 daily until seen. She worsened with stopping prednisone prior to this admission   Continue pirfenidone  Azithromycin D#3/3  Inhaled bronchodilators   PT/OT, OOB   Full code  OK for D/C planning from a pulmonary perspective with above prednisone instructions, I sent Rx already. Has upcoming f/u with Dr. Sergio Taylor. I saw this patient with my attending physician Dr. Pradeep Granda.

## 2023-03-05 NOTE — PROGRESS NOTES
Inpatient Physical Therapy {notetype:73108}    Unit: {unit location:56250}  Date:  3/5/2023  Patient Name:    Hasmukh Bishop  Admitting diagnosis:  Acute on chronic respiratory failure with hypoxia and hypercapnia ([facility] [address] [J96.21, J96.22]  Admit Date:  3/2/2023  Precautions/Restrictions/WB Status/ Lines/ Wounds/ Oxygen: {precautions:58392}    Treatment Time:  ***  Treatment Number:  {treatment number:81883}  Timed Code Treatment Minutes: *** minutes  Total Treatment Minutes:  ***  minutes    Patient Stated Goals for Therapy: \" *** \"          Discharge Recommendations: {DC Recs:45167}  DME needs for discharge: {DME needs for discharge:50816}       Therapy recommendation for EMS Transport: {transport:83794}    Therapy recommendations for staff:   {IP Assist Levels:34914} for {bed/transfers/amb:24758}    History Of Present Illness:     Di: \"The patient is a 77 y.o. female with PMH below, presents with SOB/LEE, increased O2 demand. Pr reports she began feeling poorly several days ago. She has felt worse since the onset. She has Hx of ILD and dCHF. She is normally on 3-4L at rest and 8L w/ exertion. She has been having to wear 8 L at all times and still is having significant desats and becomes winded w/ minimal exertion. She jones CP. PT was recently admitted after similar presentation and was treated for aeILD and PNA. She was DC on 2/22. \"   Past Medical History:    Past Medical History []Expand by Default            Diagnosis Date    A-fib ([facility] [address]      Anxiety      COPD (chronic obstructive pulmonary disease) ([facility] [address]      Cryptogenic organizing pneumonia ([facility] [address]      Depression      Diastolic CHF ([facility] [address]       From ECHO. GERD (gastroesophageal reflux disease)      Hyperlipidemia      On home oxygen therapy       uses O2 3L prn    Rash      Thyroid disease       hypothyroidism        Chest CTLimited exam. No definite evidence of pulmonary embolism in the central pulmonary arteries.  Severe emphysema with fibrotic changes, unchanged. No focal consolidations. No pleural effusion. Apparent step-off of the manubrium and inferior sternum could be an acute fracture versus artifactual related to patient's motion. Correlated with clinical exam and point tenderness, if indicated this could be further evaluated with sternal views. Home Health S4 Level Recommendation:  {Home Health S4 Level :14340::\"NA\"}    AM-PAC Mobility Score            Subjective  Patient {pt location:33788} with {visitor present:88987}. Pt agreeable to this PT session. Cognition    A&O {Alert and Oriented:44114}   Able to follow {Cognition:98503}    Pain   {Yes No Unknown:29842}  Location: ***  Rating: {Pain Rating :74550} /10  Pain Medicine Status: {Pain Med Status:91377}    Preadmission Environment    Pt. Lives with spouse & 1 dog  Home environment:   1 story home   Steps to enter first floor: Ramp  Steps to second floor: N/A  Bathroom: Walk in [employer], [employer], Shower Chair  and comfort height toilet without grab bars   Equipment owned: rollator, manual WC, Shower Chair, BSC, lift chair, home O2 (4L -  goes up to 6L O2 when moving ) continuous, pulse ox and nebulizer      Preadmission Status:  Pt. Able to drive: No,  drives   Pt Fully independent with ADLs: Needs assist with ADLs  Pt. Required assistance from family for: Patient's spouse does cooking, laundry, cleaning. pt's spouse completes the yard work   Pt. Fully independent for transfers with use of manual WC. Has been doing pivot transfers WC to/from toilet  History of falls: No  Spouse works at night. Dtr comes 3 days per week to assist pt with bathing, meal prep, and laundry. Would like to set up home PT upon going home. Objective  Does this pt have an acute or acute on chronic diagnosis of CHF? {CHF yes/no:11829}    Upper Extremity ROM/Strength  Please see OT evaluation.       Lower Extremity ROM / Strength   AROM WFL: {Yes No Unknown:69046}  ROM limitations: ***    {strength:90064::\"Strength Assessment (measured on a 0-5 scale):\"}  R LE   Quad   {Strength Assessment:56144}   Ant Tib  {Strength Assessment:22556}   Hamstring {Strength Assessment:22556}   Iliopsoas {Strength Assessment:24213}  L LE  Quad   {Strength Assessment:95584}   Ant Tib  {Strength Assessment:32780}   Hamstring {Strength Assessment:09360}   Iliopsoas {Strength Assessment:90363}    Lower Extremity Sensation    {WNL Impaired:87034}    Coordination and Tone  {WNL Impaired:11599}    Balance  Static Sitting:  {Normal Good Fair Poor:79002}; {Level of assist:97366} {assist number of ppl:54085}  Dynamic Sitting:  {Normal Good Fair Poor:29953}; {Level of assist:47712} {assist number of ppl:57737}  Comments: ***    Static Standing: {Normal Good Fair Poor:12207}; {Level of assist:83214} {assist number of ppl:74469}  Dynamic Standing: {Normal Good Fair Poor:96679}; {Level of assist:98612} {assist number of ppl:63500}  Comments: ***    Posture  Seated: {posture:22917}  Standing: {posture:41304}    Bed Mobility   Supine to Sit:    {Level of assist:77845} {assist number of ppl:38859}  Sit to Supine:   {Level of assist:24717} {assist number of ppl:25011}  Rolling:   {Level of assist:65452} {assist number of ppl:83756}  Scooting in sitting: {Level of assist:41333} {assist number of ppl:08282}  Scooting in supine:  {Level of assist:52893} {assist number of ppl:19089}  Bridging:  {Level of assist:25827} {assist number of ppl:52154}    Transfer Training     Sit to stand:   {Level of assist:09056} {assist number of ppl:04295}  Stand to sit:   {Level of assist:79913} {assist number of ppl:73379}  Bed to Chair:   {Level of assist:10772} {assist number of ppl:72832} with use of {AD list:45940}    Gait {defer gait:16731::\"gait completed as indicated below\"}  Distance:      *** ft  Deviations (firm surface/linoleum):  {Gait deviations:09903}  Assistive Device Used:    {AD list:93933}  Level of Assist:    {Level of assist:23686} {assist number of ppl:37051}  Comment: ***    Stair Training {defer stairs:72910::\"deferred, pt unsafe/ not appropriate to complete stairs at this time\"}  # of Steps:   {number of steps:85925}  Level of Assist:  {Level of assist:01285} {assist number of ppl:69889}  UE Support:  {none/unilat/bilat:28993}  Assistive Device:  {Assistive Devices Basic:30631}  Pattern:   {stair deviations:99727}  Comments: ***     Therapeutic Exercises Initiated  {therex:97513}  Supine:  {supine therex:56606}    Seated:  {seated therex:55975}    Standing:  {standing therex:24641}    Activity Tolerance   During therapy session noted pt with {Activity Tolerance:90349}    Pt Position BP (mmHg) HR (bpm) SpO2 (%) on ***  Comments   Supine at rest *** *** ***    Seated at EOB       Standing       End of session         Positioning Needs   {End of Session position:67303}    Other Activities  None. Patient/Family Education   Pt educated on role of inpatient PT, POC, importance of continued activity, {education list:67806}. Assessment  Pt seen today for physical therapy {notetype:84184}. Pt demonstrated decreased {Assessment Statement:17149} as well as decreased independence with {Assessment 2:55148}. {DC justification:96664}    Goals : To be met in 3 visits:  1). Independent with LE Ex x 10 reps  2). Sit to/from stand: {Level of assist:96829}  3). Bed to chair: {Level of assist:66843}  4). CHF goal: {CHF:76791}    To be met in 6 visits:  1). Supine to/from sit: {Level of assist:02292}  2). Sit to/from stand: {Level of assist:45006}  3). Bed to chair: {Level of assist:54214}  4). Gait: Ambulate {Gait Goal:65249}  with {Level of assist:60854} and use of {AD list:65252}  5). Tolerate B LE exercises 3 sets of 10-15 reps  6).   Ascend/descend *** steps with {Level of assist:81501} with use of {stairs goal:40654} and {IP AD list:27702}    Rehabilitation Potential: {Good Fair Poor:51102}  Strengths for achieving goals include:   {Strengths for Achieving Goals:00199}   Barriers to achieving goals include:    {barriers to therapy:45106}    Plan    To be seen {Plan of care:51940} while in acute care setting for therapeutic exercises, bed mobility, transfers, progressive gait training, balance training, and family/patient education. Signature: Edmond Wright PT     If patient discharges from this facility prior to next visit, this note will serve as the Discharge Summary.

## 2023-03-05 NOTE — PROGRESS NOTES
Pulse oximetry assessment   70% at rest on room air (if 88% or less, skip next steps)  Did not attempt while ambulating on room air  99% at rest on 8 LPM  84% while ambulating on 8 LPM

## 2023-03-05 NOTE — DISCHARGE INSTR - COC
Continuity of Care Form    Patient Name: Jannet Bryant   :  1955  MRN:  [id number]    Admit date:  3/2/2023  Discharge date:  3/5/2023    Code Status Order: Full Code   Advance Directives:     Admitting Physician:  Mart Nelson MD  PCP: Esperanza Landa MD    Discharging Nurse: Salvador Jang 23 Unit/Room#: /5513-51  Discharging Unit Phone Number: [phone]    Emergency Contact:   Extended Emergency Contact Information  Primary Emergency Contact: [facility]  Address: [address] [place] [address] Phone: [phone]  Mobile Phone: [phone]  Relation: Spouse  Secondary Emergency Contact: [facility] [address]  Address: [address] Phone: [phone]  Mobile Phone: [phone]  Relation: Child    Past Surgical History:  Past Surgical History:   Procedure Laterality Date    ARM SURGERY Left 3/2/2020    LEFT ULNAR NERVE DECOMPRESSION AT THE ELBOW performed by Nicolette Khan MD at [facility] N/A 2019    EBUS WF W/ANES.  performed by Chayo Cuevas MD at [place] [address]  2019    BRONCHOSCOPY/TRANSBRONCHIAL NEEDLE BIOPSY performed by Chayo Cuevas MD at [place] [address]  2019    BRONCHOSCOPY/TRANSBRONCHIAL LUNG BIOPSY performed by Chayo Cuevas MD at [place] [address]  2019    BRONCHOSCOPY ALVEOLAR LAVAGE performed by Chayo Cuevas MD at [place] [address]  07/10/2019    BRONCHOSCOPY N/A 7/10/2019    BRONCHOSCOPY ALVEOLAR LAVAGE performed by Aaliyah Silva MD at [place] [address] Bilateral 2019    BRONCHOSCOPY N/A 2019    BRONCHOSCOPY ALVEOLAR LAVAGE performed by Rafi Jones MD at [place] [address] N/A 2019    BRONCHOSCOPY ALVEOLAR LAVAGE performed by Cyril Sarabia MD at [address] [place] [address] COLONOSCOPY N/A 8/25/2022    COLONOSCOPY POLYPECTOMY SNARE/COLD BIOPSY performed by Liset Lew MD at [address] [place]  9/15/2022    CT GUIDED CHEST TUBE 9/15/2022 [address] SCAN    CT INSERT CATH PLEURA W IMAGE  11/28/2022    CT INSERT CATH PLEURA W IMAGE 11/28/2022 Jackie Jerome MD [facility] CT SCAN    HYSTERECTOMY, TOTAL ABDOMINAL (CERVIX REMOVED)      partial       Immunization History:   Immunization History   Administered Date(s) Administered    COVID-19, MODERNA BLUE border, Primary or Immunocompromised, (age 12y+), IM, 100 mcg/0.5mL 03/25/2021, 04/22/2021    Influenza Vaccine, unspecified formulation 02/13/2016    Influenza Virus Vaccine 01/15/2016, 02/13/2016    Influenza, FLUAD, (age 72 y+), Adjuvanted, 0.5mL 11/23/2020, 11/17/2021    Influenza, Triv, inactivated, subunit, adjuvanted, IM (Fluad 65 yrs and older) 11/14/2019    Pneumococcal Conjugate 13-valent ([id number]) 06/18/2019    Pneumococcal Polysaccharide ([id number]) 11/14/2013, 04/03/2014    Tdap (Boostrix, Adacel) 02/04/2021       Active Problems:  Patient Active Problem List   Diagnosis Code    Chest pain R07.9    HTN (hypertension) I10    Hyperlipidemia with target LDL less than 130 E78.5    Hypokalemia E87.6    Insomnia G47.00    Trochanteric bursitis of both hips M70.61, M70.62    Migraine G43.909    Syncope R55    Acute blood loss anemia D62    Fatigue R53.83    Hypothyroidism E03.9    Mild single current episode of major depressive disorder ([facility]) F32.0    Anxiety F41.9    Pneumonia of both lower lobes due to infectious organism J18.9    A-fib ([facility]) I48.91    Atypical pneumonia J18.9    Acute bronchitis with bronchospasm J20.9    Acute on chronic diastolic CHF (congestive heart failure) ([facility]) I50.33    Class 2 obesity with body mass index (BMI) of 39.0 to 39.9 in adult E66.9, Z68.39    Abnormal chest CT F38.07    Acute diastolic heart failure (HCC) I50.31    ILD (interstitial lung disease) ([facility] J84.9    Acute on chronic respiratory failure with hypoxia ([facility]) J96.21    Restrictive lung disease J98.4    Abnormal CT of the chest R93.89    SOB (shortness of breath) R06.02    Acute cystitis without hematuria N30.00    Dyspnea and respiratory abnormalities R06.00, R06.89    Acute on chronic respiratory failure with hypoxemia ([facility]) J96.21    Cryptogenic organizing pneumonia ([facility]) J84.116    Pneumothorax of left lung after biopsy J95.811    COPD (chronic obstructive pulmonary disease) ([facility]) J44.9    Type 2 diabetes mellitus without complication ([facility]) I94.6    Hyponatremia E87.1    Numbness and tingling in left hand R20.0, R20.2    Ulnar neuropathy at elbow of left upper extremity G56.22    Acute congestive heart failure ([facility]) I50.9    Left wrist pain M25.532    Left wrist tendinitis M77.8    GERD (gastroesophageal reflux disease) W71.4    Toxic metabolic encephalopathy A98.6    VIRGINIE (acute kidney injury) ([facility]) N17.9    Lumbar radiculopathy M54.16    Acute respiratory failure with hypoxia and hypercapnia ([facility]) J96.01, J96.02    Rib pain on left side R07.81    Elevated brain natriuretic peptide (BNP) level R79.89    Fracture of multiple ribs of left side S22.42XA    Interstitial lung disease ([facility]) J84.9    Hypomagnesemia E83.42    Depression F32. A    Chronic diastolic congestive heart failure ([facility]) I50.32    Burst fracture of lumbar vertebra ([facility] S32.001A    H/O Spinal surgery Z98.890    Severe malnutrition ([facility] E43    Pneumonia due to COVID-19 virus U07.1, J12.82    Primary spontaneous pneumothorax J93.11    Secondary spontaneous pneumothorax J93.12    COVID-19 U07.1    Acute on chronic respiratory failure ([facility]) J96.20    Diarrhea R19.7    Pulmonary fibrosis ([facility]) J84.10    PSVT (paroxysmal supraventricular tachycardia) ([facility]) I47.1    PAF (paroxysmal atrial fibrillation) ([facility]) I48.0    SVT (supraventricular tachycardia) ([facility]) I47.1    Pneumothorax J93.9    Tension pneumothorax J93.0    Dependence on supplemental oxygen Z99.81 Atrial fibrillation with RVR ([facility]) I48.91    Chronic hypoxemic respiratory failure ([facility]) J96.11    COPD exacerbation ([facility]) J44.1    Acute hypoxemic respiratory failure ([facility]) J96.01    Epistaxis R04.0    History of COPD Z87.09    Acute on chronic respiratory failure with hypoxia and hypercapnia ([facility]) J96.21, J96.22       Isolation/Infection:   Isolation            No Isolation          Patient Infection Status       Infection Onset Added Last Indicated Last Indicated By Review Planned Expiration Resolved Resolved By    None active    Resolved    COVID-19 (Rule Out) 03/02/23 03/02/23 03/02/23 COVID-19, Rapid (Ordered)   03/02/23 Rule-Out Test Resulted    COVID-19 (Rule Out) 02/19/23 02/19/23 02/19/23 COVID-19, Rapid (Ordered)   02/19/23 Rule-Out Test Resulted    COVID-19 (Rule Out) 01/17/23 01/17/23 01/17/23 COVID-19, Rapid (Ordered)   01/17/23 Rule-Out Test Resulted    COVID-19 (Rule Out) 11/26/22 11/26/22 11/26/22 COVID-19, Rapid (Ordered)   11/26/22 Rule-Out Test Resulted    COVID-19 (Rule Out) 10/19/22 10/19/22 10/19/22 COVID-19 & Influenza Combo (Ordered)   10/19/22 Rule-Out Test Resulted    C-diff Rule Out 10/19/22 10/19/22 10/19/22 GI Bacterial Pathogens By PCR (Ordered)   10/19/22 Rule-Out Test Resulted    COVID-19 09/09/22 09/09/22 09/09/22 COVID-19 & Influenza Combo   09/19/22 Glenna Garcia RN    Pt is asymptomatic and 5 days or greater since positive testing, per Dr and PCU staff,     COVID-19 (Rule Out) 09/09/22 09/09/22 09/09/22 COVID-19 & Influenza Combo (Ordered)   09/09/22 Rule-Out Test Resulted    COVID-19 (Rule Out) 07/14/22 07/14/22 07/14/22 COVID-19, Rapid (Ordered)   07/14/22 Rule-Out Test Resulted    COVID-19 (Rule Out) 07/04/22 07/04/22 07/04/22 COVID-19, Rapid (Ordered)   07/04/22 Rule-Out Test Resulted    COVID-19 (Rule Out) 05/06/22 05/06/22 05/06/22 COVID-19, Rapid (Ordered)   05/06/22 Rule-Out Test Resulted    COVID-19 (Rule Out) 04/18/22 04/18/22 04/18/22 COVID-19 & Influenza Combo (Ordered)   04/18/22 Rule-Out Test Resulted    COVID-19 (Rule Out) 01/25/22 01/25/22 01/25/22 COVID-19 & Influenza Combo (Ordered)   01/25/22 Rule-Out Test Resulted    COVID-19 (Rule Out) 01/24/22 01/24/22 01/24/22 COVID-19, Rapid (Ordered)   01/24/22 Rule-Out Test Resulted    COVID-19 (Rule Out) 06/18/21 06/18/21 06/18/21 COVID-19 & Influenza Combo (Ordered)   06/18/21 Rule-Out Test Resulted            Nurse Assessment:  Last Vital Signs: /79   Pulse 93   Temp 97.3 °F (36.3 °C) (Oral)   Resp 20   Ht 5' 3\" (1.6 m)   Wt 104 lb 12.8 oz (47.5 kg)   SpO2 100%   BMI 18.56 kg/m²     Last documented pain score (0-10 scale):    Last Weight:   Wt Readings from Last 1 Encounters:   03/05/23 104 lb 12.8 oz (47.5 kg)     Mental Status:  oriented    IV Access:  - None    Nursing Mobility/ADLs:  Walking   Assisted  Transfer  Assisted  Bathing  Assisted  Dressing  Assisted  Toileting  Independent  Feeding  Independent  Med Admin  Independent  Med Delivery   prefers mixed with yogurt,applesauce, or pudding    Wound Care Documentation and Therapy:        Elimination:  Continence: Bowel: Yes  Bladder: Yes  Urinary Catheter: None   Colostomy/Ileostomy/Ileal Conduit: No       Date of Last BM: 3/5/2023    Intake/Output Summary (Last 24 hours) at 3/5/2023 0949  Last data filed at 3/5/2023 0438  Gross per 24 hour   Intake 222 ml   Output 2350 ml   Net -2128 ml     I/O last 3 completed shifts: In: 222 [P.O.:222]  Out: [address] [Urine:2850]    Safety Concerns: At Risk for Falls    Impairments/Disabilities:      None    Nutrition Therapy:  Current Nutrition Therapy:   - Oral Diet:  Carb Control 4 carbs/meal (1800kcals/day)    Routes of Feeding: Oral  Liquids: Thin Liquids  Daily Fluid Restriction: no  Last Modified Barium Swallow with Video (Video Swallowing Test): not done    Treatments at the Time of Hospital Discharge:   Respiratory Treatments: 8-10 L of oxygen.     Oxygen Therapy:  is on oxygen at 8-10 L/min per nasal cannula. Ventilator:    - No ventilator support    Rehab Therapies: None. Weight Bearing Status/Restrictions: No weight bearing restrictions  Other Medical Equipment (for information only, NOT a DME order):  wheelchair, walker, and bedside commode  Other Treatments:     Patient's personal belongings (please select all that are sent with patient):  Dentures upper and lower    RN SIGNATURE:  Electronically signed by Georges Kelley RN on 3/5/23 at 9:55 AM EST    CASE MANAGEMENT/SOCIAL WORK SECTION    Inpatient Status Date: ***    Readmission Risk Assessment Score:  Readmission Risk              Risk of Unplanned Readmission:  52           Discharging to Facility/ Agency   Name:   Address:  Phone:  Fax:    Dialysis Facility (if applicable)   Name:  Address:  Dialysis Schedule:  Phone:  Fax:    / signature: {Esignature:[id number]}    PHYSICIAN SECTION    Prognosis: {Prognosis:1976230081}    Condition at Discharge: Irma Liz Ricky Patient Condition:[id number]}    Rehab Potential (if transferring to Rehab): {Prognosis:6675936820}    Recommended Labs or Other Treatments After Discharge: ***    Physician Certification: I certify the above information and transfer of Sarah Garner  is necessary for the continuing treatment of the diagnosis listed and that she requires {Admit to Appropriate Level of Care:64798} for {GREATER/LESS:146082112} 30 days.      Update Admission H&P: {CHP DME Changes in [facility]:679439817}    PHYSICIAN SIGNATURE:  {Esignature:[id number]}

## 2023-03-05 NOTE — PROGRESS NOTES
03/04/23 1900   RT Protocol   History Pulmonary Disease 2   Respiratory pattern 2   Breath sounds 2   Cough 0   Indications for Bronchodilator Therapy Decreased or absent breath sounds   Bronchodilator Assessment Score 6   RT Inhaler-Nebulizer Bronchodilator Protocol Note    There is a bronchodilator order in the chart from a provider indicating to follow the RT Bronchodilator Protocol and there is an Initiate RT Inhaler-Nebulizer Bronchodilator Protocol order as well (see protocol at bottom of note). CXR Findings:  No results found. The findings from the last RT Protocol Assessment were as follows:   History Pulmonary Disease: Chronic pulmonary disease  Respiratory Pattern: Dyspnea on exertion or RR 21-25 bpm  Breath Sounds: Slightly diminished and/or crackles  Cough: Strong, spontaneous, non-productive  Indication for Bronchodilator Therapy: Decreased or absent breath sounds  Bronchodilator Assessment Score: 6    Aerosolized bronchodilator medication orders have been revised according to the RT Inhaler-Nebulizer Bronchodilator Protocol below. Respiratory Therapist to perform RT Therapy Protocol Assessment initially then follow the protocol. Repeat RT Therapy Protocol Assessment PRN for score 0-3 or on second treatment, BID, and PRN for scores above 3. No Indications - adjust the frequency to every 6 hours PRN wheezing or bronchospasm, if no treatments needed after 48 hours then discontinue using Per Protocol order mode. If indication present, adjust the RT bronchodilator orders based on the Bronchodilator Assessment Score as indicated below. Use Inhaler orders unless patient has one or more of the following: on home nebulizer, not able to hold breath for 10 seconds, is not alert and oriented, cannot activate and use MDI correctly, or respiratory rate 25 breaths per minute or more, then use the equivalent nebulizer order(s) with same Frequency and PRN reasons based on the score.   If a patient is on this medication at home then do not decrease Frequency below that used at home. 0-3 - enter or revise RT bronchodilator order(s) to equivalent RT Bronchodilator order with Frequency of every 4 hours PRN for wheezing or increased work of breathing using Per Protocol order mode. 4-6 - enter or revise RT Bronchodilator order(s) to two equivalent RT bronchodilator orders with one order with BID Frequency and one order with Frequency of every 4 hours PRN wheezing or increased work of breathing using Per Protocol order mode. 7-10 - enter or revise RT Bronchodilator order(s) to two equivalent RT bronchodilator orders with one order with TID Frequency and one order with Frequency of every 4 hours PRN wheezing or increased work of breathing using Per Protocol order mode. 11-13 - enter or revise RT Bronchodilator order(s) to one equivalent RT bronchodilator order with QID Frequency and an Albuterol order with Frequency of every 4 hours PRN wheezing or increased work of breathing using Per Protocol order mode. Greater than 13 - enter or revise RT Bronchodilator order(s) to one equivalent RT bronchodilator order with every 4 hours Frequency and an Albuterol order with Frequency of every 2 hours PRN wheezing or increased work of breathing using Per Protocol order mode.        Electronically signed by Stuart Simon RCP on 3/4/2023 at 7:26 PM

## 2023-03-05 NOTE — DISCHARGE SUMMARY
Name:  Timoteo Meier  Room:  /0682-06  MRN:    [id number]    Discharge Summary      This discharge summary is in conjunction with a complete physical exam done on the day of discharge. Discharging Physician: Katie Mejia MD      Admit: 3/2/2023  Discharge:  3/5/2023     Diagnoses this Admission    Principal Problem:    Acute on chronic respiratory failure with hypoxia and hypercapnia (HCC)  Active Problems:    Chronic diastolic congestive heart failure (HCC)    Severe malnutrition (HCC)    HTN (hypertension)    A-fib ([facility]    Cryptogenic organizing pneumonia ([facility]  Resolved Problems:    * No resolved hospital problems. *      Procedures (Please Review Full Report for Details)    none    Consults    IP CONSULT TO PULMONOLOGY  IP CONSULT TO PALLIATIVE CARE      HPI:    The patient is a 77 y.o. female with PMH below, presents with SOB/LEE, increased O2 demand. Pr reports she began feeling poorly several days ago. She has felt worse since the onset. She has Hx of ILD and dCHF. She is normally on 3-4L at rest and 8L w/ exertion. She has been having to wear 8 L at all times and still is having significant desats and becomes winded w/ minimal exertion. She jones CP. PT was recently admitted after similar presentation and was treated for aeILD and PNA. She was DC on 2/22. Physical Exam at Discharge:  /79   Pulse 93   Temp 97.3 °F (36.3 °C) (Oral)   Resp 20   Ht 5' 3\" (1.6 m)   Wt 104 lb 12.8 oz (47.5 kg)   SpO2 100%   BMI 18.56 kg/m²     General appearance: alert, appears stated age and cooperative. Chronically ill appearing. Head: Normocephalic, without obvious abnormality, atraumatic  Eyes: conjunctivae/corneas clear. PERRL, EOM's intact. Neck: no adenopathy, no carotid bruit, no JVD, supple, symmetrical, trachea midline and thyroid not enlarged, symmetric, no tenderness/mass/nodules  Lungs: coarse crackles both lungs, no accessory muscle use.    Heart: regular rate and rhythm, S1, S2 normal, no murmur, click, rub or gallop  Abdomen: soft, non-tender; bowel sounds normal; no masses,  no organomegaly  Extremities: extremities normal, atraumatic, no cyanosis or edema  Pulses: 2+ and symmetric  Skin: Skin color, texture, turgor normal. No rashes or lesions  Neurologic: Grossly normal    Hospital Course       # Acute on chronic respiratory failure with hypoxia  # ILD with  acute exacerbation  # Recent Hx cryptogenic organizing pneumonia  -Baseline 8L w/ ambulation and 4 L at rest  -Requiring 8-10L at rest w/ desats on ambulation and with associated dyspnea and desats. Improved today. Lower oxygen. -PCT 0.07. -CXR and CT non-acute  -IV solumedrol started. Sent home on longer steroid taper. -PRN/QUOC IBD  -IV azithro- finished 3/3 days  -Chk resp Cx      #PAF  -Cont diltiazem and nadolol  -Not on AC 2/2 due to recurrent epistaxis and chronic anemia   -Currently has heart monitor for work-up for watchman implant     #Diastolic CHF  -without apparent exacerbation  -last echo as above   -monitor I&Os and daily weights  -Only takes diuretic PRN.      #Chronic anemia  -Hgb stable at 10; monitored CBC     #Type II DM  -reports no longer requiring meds 2/2 weight loss  -monitor POC glucose, SSI prn     #Hypothyroidism  -continue synthroid     #HLD  -continue statin     #Anxiety  -continue daily sertraline and prn ativan     #Severe PCM       CBC:   Recent Labs     03/02/23 1755 03/03/23  0502   WBC 8.1 5.9   HGB 9.9* 10.0*   HCT 31.3* 31.1*   MCV 79.2* 80.7    142     BMP:   Recent Labs     03/02/23 1755 03/03/23  0502    139   K 4.1 3.9   CL 96* 99   CO2 38* 29   BUN 12 12   CREATININE <0.5* <0.5*     LIVER PROFILE:   Recent Labs     03/02/23 1755 03/03/23  0502   AST 38* 25   ALT 28 26   BILITOT 0.3 0.3   ALKPHOS 93 91     PT/INR:   Recent Labs     03/02/23 1755   PROTIME 13.9   INR 1.08     Cultures:   COVID and Flu neg      CT CHEST PULMONARY EMBOLISM W CONTRAST Final Result   Limited exam.      No definite evidence of pulmonary embolism in the central pulmonary arteries. Severe emphysema with fibrotic changes, unchanged. No focal consolidations. No pleural effusion. Apparent step-off of the manubrium and inferior sternum could be an acute   fracture versus artifactual related to patient's motion. Correlated with   clinical exam and point tenderness, if indicated this could be further   evaluated with sternal views. XR CHEST PORTABLE   Final Result   Extensive chronic lung changes. Aeration of the right lower lobe may have   slightly worsened. Discharge Medications     Medication List        START taking these medications      predniSONE 10 MG tablet  Commonly known as: DELTASONE  Take 4 tabs by mouth daily for 5 days, then 3 tabs daily for 5 days, then 2 tabs daily for 5 days, then 1 tab daily thereafter            CHANGE how you take these medications      traMADol 50 MG tablet  Commonly known as: Ultram  Take 1-2 tablets by mouth every 6 hours as needed for Pain for up to 180 days. Take lowest dose possible to manage pain  What changed: Another medication with the same name was removed. Continue taking this medication, and follow the directions you see here.             CONTINUE taking these medications      * albuterol (2.5 MG/3ML) 0.083% nebulizer solution  Commonly known as: PROVENTIL  INHALE THE CONTENTS OF 1 VIAL VIA NEBULIZER EVERY 6 HOURS AS NEEDED FOR WHEEZING     * albuterol sulfate  (90 Base) MCG/ACT inhaler  Commonly known as: PROVENTIL;VENTOLIN;PROAIR  INHALE 2 PUFFS INTO THE LUNGS EVERY 6 HOURS AS NEEDED FOR WHEEZING     atorvastatin 40 MG tablet  Commonly known as: LIPITOR  TAKE 1 TABLET EVERY DAY     benzonatate 200 MG capsule  Commonly known as: TESSALON  TAKE 1 CAPSULE BY MOUTH 3 TIMES A DAY AS NEEDED FOR COUGH     dilTIAZem 240 MG extended release capsule  Commonly known as: CARDIZEM CD  Take 1 capsule by mouth daily     furosemide 40 MG tablet  Commonly known as: LASIX  Take 0.5 tablets by mouth See Admin Instructions Daily prn for edema     guaiFENesin 600 MG extended release tablet  Commonly known as: MUCINEX     hydrOXYzine HCl 10 MG tablet  Commonly known as: ATARAX     levothyroxine 125 MCG tablet  Commonly known as: SYNTHROID  Take 1 tablet by mouth Daily TAKE 1 TABLET BY MOUTH EVERY DAY     LORazepam 0.5 MG tablet  Commonly known as: ATIVAN     mirtazapine 30 MG tablet  Commonly known as: REMERON  TAKE 1 TABLET EVERY NIGHT     nadolol 20 MG tablet  Commonly known as: CORGARD  Take 1 tablet by mouth daily     omeprazole 40 MG delayed release capsule  Commonly known as: PRILOSEC  Take 1 capsule by mouth every morning (before breakfast)     ondansetron 4 MG tablet  Commonly known as: ZOFRAN  Take 1 tablet by mouth 3 times daily as needed for Nausea or Vomiting     Pirfenidone 267 MG Tabs  pirfenidone daily for a week and then BID for a week and then back to TID.     potassium chloride 20 MEQ extended release tablet  Commonly known as: KLOR-CON M  Take 1 tablet by mouth daily     sertraline 100 MG tablet  Commonly known as: ZOLOFT  TAKE 2 TABLETS EVERY DAY     sodium chloride 0.65 % nasal spray  Commonly known as: OCEAN  1 spray by Nasal route in the morning and at bedtime     WOMENS ONE DAILY PO           * This list has 2 medication(s) that are the same as other medications prescribed for you. Read the directions carefully, and ask your doctor or other care provider to review them with you. Where to Get Your Medications        These medications were sent to [facility]/pharmacy #[id number]- [place], [place] - [address] [facility] [phone] - F [phone]  [address]      Phone: [phone]   predniSONE 10 MG tablet           Discharge Condition/Location: Stable    Follow Up: Follow up with PCP.         oKrin Meraz MD 3/5/2023 11:17 AM

## 2023-03-05 NOTE — FLOWSHEET NOTE
03/04/23 2113   Vital Signs   Temp (!) 96.7 °F (35.9 °C)   Temp Source Oral   Heart Rate 86   Heart Rate Source Monitor   Resp 18   /74   MAP (Calculated) 94   BP Location Right lower arm   Level of Consciousness 0   MEWS Score 1   Oxygen Therapy   SpO2 97 %   O2 Device High flow nasal cannula   O2 Flow Rate (L/min) 8 L/min   Pt assessment complete. Pt lying in bed quietly. Lung sounds diminished. Pt on 02 8 liters via high flow nasal canula. Nightly medications given. Denies needs at this time. Call light within reach. Bed exit alarm on.

## 2023-03-06 ENCOUNTER — CARE COORDINATION (OUTPATIENT)
Dept: CASE MANAGEMENT | Age: 68
End: 2023-03-06

## 2023-03-06 ENCOUNTER — FOLLOWUP TELEPHONE ENCOUNTER (OUTPATIENT)
Dept: ADMINISTRATIVE | Age: 68
End: 2023-03-06

## 2023-03-06 NOTE — TELEPHONE ENCOUNTER
Heart Failure Follow-up Call:     1st Attempt completed by Care Transition Nurse Jil Martin on 3/6/23 with no answer. 2nd Attempt completed by Mark Ennis; No Answer- Left HIPAA compliant voicemail with Non-Urgent Heart Failure Resource Line number for call back.     Electronically signed by HARLEY Lennon, RN  on 3/6/2023 at 3:20 PM

## 2023-03-07 ENCOUNTER — CARE COORDINATION (OUTPATIENT)
Dept: CASE MANAGEMENT | Age: 68
End: 2023-03-07

## 2023-03-07 DIAGNOSIS — J96.01 ACUTE HYPOXEMIC RESPIRATORY FAILURE (HCC): Primary | ICD-10-CM

## 2023-03-07 PROCEDURE — 1111F DSCHRG MED/CURRENT MED MERGE: CPT | Performed by: FAMILY MEDICINE

## 2023-03-07 NOTE — CARE COORDINATION
[facility] Care Transitions Initial Follow Up Call    Call within 2 business days of discharge: Yes    Patient Current Location: Home: [address] [place] [address]    Care Transition Nurse contacted the patient by telephone to perform post hospital discharge assessment. Provided introduction to self, and explanation of the Care Transition Nurse role. Patient: Kandi Obando Patient : 1955   MRN: [id number]  Reason for Admission: readmission from home -> acute on chronic resp failure with hypoxia, ILD with AE, recent hx cryptogenic organizing PNA, baseline 8L w/ ambulation and 4L at rest, PAF-no AC 2/2 recurrent epistaxis and chronic anemia, dCHF no AE, DM2, hypothyroidism, HLD, anxiety, severe PCM -> home with [facility], daughter is HHA through [place]  Discharge Date: 3/5/23 RARS: Readmission Risk Score: 36      Last Discharge  [facility]       Date Complaint Diagnosis Description Type Department Provider    3/2/23 Shortness of Breath Acute on chronic respiratory failure with hypoxia and hypercapnia [facility]) ED to Hosp-Admission (Discharged) (ADMITTED) [address]U Epifanio Roca MD; Fran Houston. .. Challenges to be reviewed by the provider   Additional needs identified to be addressed with provider: No         Method of communication with provider: none. Left message on daughter's voice mail. Reached patient on her home number. States she changed her hospital follow up visit to virtual. Her daughter is her HHA through [employer] and manages her medications. States she has all except the benzonatate daughter will get ordered through the mail order pharmacy. Nebulizer works. She is SOB in conversation. She will have new support services through [facility] from [facility]. They are looking for additional assistance in the evenings on nights her daughter is unable to. Her spouse works 4pm-midnight.  The RN through [facility] transitions program is helping them with resources. She denies needs. Ended call because she was getting SOB. Instructed her to have all of her medications handy for her vv with PCP in two days. She voices understanding.     Care Transition Nurse reviewed discharge instructions, medical action plan, and red flags with patient who verbalized understanding. The patient was given an opportunity to ask questions and does not have any further questions or concerns at this time. Were discharge instructions available to patient? Yes. Reviewed appropriate site of care based on symptoms and resources available to patient including: PCP  Specialist  [facility] . The patient agrees to contact the PCP office for questions related to their healthcare.     Advance Care Planning:   Does patient have an Advance Directive: reviewed and current.    Medication reconciliation was performed with patient, who verbalizes understanding of administration of home medications. Medications reviewed, 1111F entered: yes    Was patient discharged with a pulse oximeter? no    Non-face-to-face services provided:  Obtained and reviewed discharge summary and/or continuity of care documents  Education of patient/family/caregiver/guardian to support self-management-CHF ed  Assessment and support for treatment adherence and medication management-     Offered patient enrollment in the Remote Patient Monitoring (RPM) program for in-home monitoring: Patient is not eligible for RPM program.    Care Transitions 24 Hour Call    Schedule Follow Up Appointment with PCP: Completed  Do you have a copy of your discharge instructions?: Yes  Do you have all of your prescriptions and are they filled?: No  Have you been contacted by a [facility] Pharmacist?: No  Have you scheduled your follow up appointment?: Yes  How are you going to get to your appointment?: Other  Post Acute Services: Home Health  Patient DME: Walker, Shower chair  Patient Home Equipment: Nebulizer, Oxygen  Do you have  support at home?: Partner/Spouse/SO  Do you feel like you have everything you need to keep you well at home?: Yes  Are you an active caregiver in your home?: No  Care Transitions Interventions    Physical Therapy: Declined Other Services: Completed    Hospice: Declined      Palliative Care: Declined            Follow Up  Future Appointments   Date Time Provider Calvin Mcmullen   3/9/2023  2:15 PM Mira Caruso MD [address] [facility] [place] [facility]   3/15/2023 10:00 AM [facility] PULMONARY FUNCTION TESTING [facility]   3/15/2023 11:15 AM MD DURAN Talley PULTJ [facility]   5/17/2023 10:00 AM PANDA Crystal - GISELLA Cobos [facility]     Care Transition Nurse provided contact information. Plan for follow-up call in 5-7 days based on severity of symptoms and risk factors. Plan for next call: self management-CHF ILD   follow-up appointment-3/9 vv with PCP - pulm?      Netta Solomon RN  Care Transition Nurse  [phone] mobile

## 2023-03-08 SDOH — ECONOMIC STABILITY: HOUSING INSECURITY
IN THE LAST 12 MONTHS, WAS THERE A TIME WHEN YOU DID NOT HAVE A STEADY PLACE TO SLEEP OR SLEPT IN A SHELTER (INCLUDING NOW)?: NO

## 2023-03-08 SDOH — ECONOMIC STABILITY: FOOD INSECURITY: WITHIN THE PAST 12 MONTHS, THE FOOD YOU BOUGHT JUST DIDN'T LAST AND YOU DIDN'T HAVE MONEY TO GET MORE.: SOMETIMES TRUE

## 2023-03-08 SDOH — ECONOMIC STABILITY: TRANSPORTATION INSECURITY
IN THE PAST 12 MONTHS, HAS LACK OF TRANSPORTATION KEPT YOU FROM MEETINGS, WORK, OR FROM GETTING THINGS NEEDED FOR DAILY LIVING?: NO

## 2023-03-08 SDOH — ECONOMIC STABILITY: INCOME INSECURITY: HOW HARD IS IT FOR YOU TO PAY FOR THE VERY BASICS LIKE FOOD, HOUSING, MEDICAL CARE, AND HEATING?: VERY HARD

## 2023-03-08 SDOH — ECONOMIC STABILITY: FOOD INSECURITY: WITHIN THE PAST 12 MONTHS, YOU WORRIED THAT YOUR FOOD WOULD RUN OUT BEFORE YOU GOT MONEY TO BUY MORE.: SOMETIMES TRUE

## 2023-03-09 ENCOUNTER — TELEMEDICINE (OUTPATIENT)
Dept: FAMILY MEDICINE CLINIC | Age: 68
End: 2023-03-09

## 2023-03-09 DIAGNOSIS — I48.0 PAROXYSMAL ATRIAL FIBRILLATION (HCC): ICD-10-CM

## 2023-03-09 DIAGNOSIS — I50.32 CHRONIC DIASTOLIC CONGESTIVE HEART FAILURE (HCC): ICD-10-CM

## 2023-03-09 DIAGNOSIS — E43 SEVERE MALNUTRITION (HCC): Chronic | ICD-10-CM

## 2023-03-09 DIAGNOSIS — J96.21 ACUTE ON CHRONIC RESPIRATORY FAILURE WITH HYPOXIA AND HYPERCAPNIA (HCC): Primary | ICD-10-CM

## 2023-03-09 DIAGNOSIS — J84.116 CRYPTOGENIC ORGANIZING PNEUMONIA (HCC): ICD-10-CM

## 2023-03-09 DIAGNOSIS — I10 PRIMARY HYPERTENSION: ICD-10-CM

## 2023-03-09 DIAGNOSIS — J96.22 ACUTE ON CHRONIC RESPIRATORY FAILURE WITH HYPOXIA AND HYPERCAPNIA (HCC): Primary | ICD-10-CM

## 2023-03-09 DIAGNOSIS — Z09 HOSPITAL DISCHARGE FOLLOW-UP: ICD-10-CM

## 2023-03-09 NOTE — PROGRESS NOTES
Post-Discharge Transitional Care  Follow Up      Armin Obando   YOB: 1955    Date of Office Visit:  3/9/2023  Date of Hospital Admission: 3/2/23  Date of Hospital Discharge: 3/5/23  Risk of hospital readmission (high >=14%. Medium >=10%) :Readmission Risk Score: 36      Care management risk score Rising risk (score 2-5) and Complex Care (Scores >=6): No Risk Score On File     Non face to face  following discharge, date last encounter closed (first attempt may have been earlier): 03/07/2023    Call initiated 2 business days of discharge: Yes    ASSESSMENT/PLAN:   Below is the assessment and plan developed based on review of pertinent history, physical exam, labs, studies, and medications. Acute on chronic respiratory failure with hypoxia and hypercapnia (HCC)  Continue prednisone taper  Follow-up with pulmonary on 3/15/2023  Cryptogenic organizing pneumonia ([facility]  Chronic diastolic congestive heart failure ([facility]  Fairly stable  Severe malnutrition ([facility]  Continue to work on improving calorie intake  Primary hypertension  Controlled  Paroxysmal atrial fibrillation [facility])  Asymptomatic  Hospital discharge follow-up    Medical Decision Making: high complexity  Return if symptoms worsen or fail to improve. A total of 43 minutes spent on this visit to include face-to-face time, reviewing hospital records, and writing note. Subjective:   HPI:  Follow up of Hospital problems/diagnosis(es): Acute on chronic respiratory failure with hypoxia and hypercapnia, chronic diastolic congestive heart failure, severe malnutrition, hypertension, atrial fibrillation, cryptogenic organizing pneumonia    Inpatient course: Discharge summary reviewed- see chart. Interval history/Current status: Feels much better than she has in a while. Continues on a steroid taper at this time. He is currently on oxygen 2 L nasal cannula while at rest and up to 8 L when she is moving. She feels much better.   Resting well.  Denies any fevers. Is scheduled to see pulmonology on 3/15/2023.     Patient Active Problem List   Diagnosis    Chest pain    HTN (hypertension)    Hyperlipidemia with target LDL less than 130    Hypokalemia    Insomnia    Trochanteric bursitis of both hips    Migraine    Syncope    Acute blood loss anemia    Fatigue    Hypothyroidism    Mild single current episode of major depressive disorder (HCC)    Anxiety    Pneumonia of both lower lobes due to infectious organism    A-fib ([address]    Atypical pneumonia    Acute bronchitis with bronchospasm    Acute on chronic diastolic CHF (congestive heart failure) (HCC)    Class 2 obesity with body mass index (BMI) of 39.0 to 39.9 in adult    Abnormal chest CT    Acute diastolic heart failure (HCC)    ILD (interstitial lung disease) (HCC)    Acute on chronic respiratory failure with hypoxia (HCC)    Restrictive lung disease    Abnormal CT of the chest    SOB (shortness of breath)    Acute cystitis without hematuria    Dyspnea and respiratory abnormalities    Acute on chronic respiratory failure with hypoxemia (HCC)    Cryptogenic organizing pneumonia (HCC)    Pneumothorax of left lung after biopsy    COPD (chronic obstructive pulmonary disease) (HCC)    Type 2 diabetes mellitus without complication (HCC)    Hyponatremia    Numbness and tingling in left hand    Ulnar neuropathy at elbow of left upper extremity    Acute congestive heart failure (HCC)    Left wrist pain    Left wrist tendinitis    GERD (gastroesophageal reflux disease)    Toxic metabolic encephalopathy    VIRGINIE (acute kidney injury) ([address]    Lumbar radiculopathy    Acute respiratory failure with hypoxia and hypercapnia (HCC)    Rib pain on left side    Elevated brain natriuretic peptide (BNP) level    Fracture of multiple ribs of left side    Interstitial lung disease (HCC)    Hypomagnesemia    Depression    Chronic diastolic congestive heart failure ([address]    Burst fracture of lumbar vertebra ([address]    H/O Spinal surgery    Severe malnutrition ([facility]    Pneumonia due to COVID-19 virus    Primary spontaneous pneumothorax    Secondary spontaneous pneumothorax    COVID-19    Acute on chronic respiratory failure (HCC)    Diarrhea    Pulmonary fibrosis (HCC)    PSVT (paroxysmal supraventricular tachycardia) (HCC)    PAF (paroxysmal atrial fibrillation) (HCC)    SVT (supraventricular tachycardia) (HCC)    Pneumothorax    Tension pneumothorax    Dependence on supplemental oxygen    Atrial fibrillation with RVR (HCC)    Chronic hypoxemic respiratory failure (HCC)    COPD exacerbation (HCC)    Acute hypoxemic respiratory failure (HCC)    Epistaxis    History of COPD    Acute on chronic respiratory failure with hypoxia and hypercapnia ([facility]       Medications listed as ordered at the time of discharge from hospital     Medication List            Accurate as of March 9, 2023  2:22 PM. If you have any questions, ask your nurse or doctor.                 CONTINUE taking these medications      * albuterol (2.5 MG/3ML) 0.083% nebulizer solution  Commonly known as: PROVENTIL  INHALE THE CONTENTS OF 1 VIAL VIA NEBULIZER EVERY 6 HOURS AS NEEDED FOR WHEEZING     * albuterol sulfate  (90 Base) MCG/ACT inhaler  Commonly known as: PROVENTIL;VENTOLIN;PROAIR  INHALE 2 PUFFS INTO THE LUNGS EVERY 6 HOURS AS NEEDED FOR WHEEZING     atorvastatin 40 MG tablet  Commonly known as: LIPITOR  TAKE 1 TABLET EVERY DAY     benzonatate 200 MG capsule  Commonly known as: TESSALON  TAKE 1 CAPSULE BY MOUTH 3 TIMES A DAY AS NEEDED FOR COUGH     dilTIAZem 240 MG extended release capsule  Commonly known as: CARDIZEM CD  Take 1 capsule by mouth daily     furosemide 40 MG tablet  Commonly known as: LASIX  Take 0.5 tablets by mouth See Admin Instructions Daily prn for edema     guaiFENesin 600 MG extended release tablet  Commonly known as: MUCINEX     hydrOXYzine HCl 10 MG tablet  Commonly known as: ATARAX     levothyroxine 125 MCG tablet  Commonly known as: SYNTHROID  Take 1 tablet by mouth Daily TAKE 1 TABLET BY MOUTH EVERY DAY     LORazepam 0.5 MG tablet  Commonly known as: ATIVAN     mirtazapine 30 MG tablet  Commonly known as: REMERON  TAKE 1 TABLET EVERY NIGHT     nadolol 20 MG tablet  Commonly known as: CORGARD  Take 1 tablet by mouth daily     omeprazole 40 MG delayed release capsule  Commonly known as: PRILOSEC  Take 1 capsule by mouth every morning (before breakfast)     ondansetron 4 MG tablet  Commonly known as: ZOFRAN  Take 1 tablet by mouth 3 times daily as needed for Nausea or Vomiting     Pirfenidone 267 MG Tabs  pirfenidone daily for a week and then BID for a week and then back to TID.     potassium chloride 20 MEQ extended release tablet  Commonly known as: KLOR-CON M  Take 1 tablet by mouth daily     predniSONE 10 MG tablet  Commonly known as: DELTASONE  Take 4 tabs by mouth daily for 5 days, then 3 tabs daily for 5 days, then 2 tabs daily for 5 days, then 1 tab daily thereafter     sertraline 100 MG tablet  Commonly known as: ZOLOFT  TAKE 2 TABLETS EVERY DAY     sodium chloride 0.65 % nasal spray  Commonly known as: OCEAN  1 spray by Nasal route in the morning and at bedtime     traMADol 50 MG tablet  Commonly known as: Ultram  Take 1-2 tablets by mouth every 6 hours as needed for Pain for up to 180 days. Take lowest dose possible to manage pain     WOMENS ONE DAILY PO           * This list has 2 medication(s) that are the same as other medications prescribed for you. Read the directions carefully, and ask your doctor or other care provider to review them with you.                     Medications marked \"taking\" at this time  Outpatient Medications Marked as Taking for the 3/9/23 encounter (Appointment) with Tika Del Castillo MD   Medication Sig Dispense Refill    predniSONE (DELTASONE) 10 MG tablet Take 4 tabs by mouth daily for 5 days, then 3 tabs daily for 5 days, then 2 tabs daily for 5 days, then 1 tab daily thereafter 75 tablet 0 Medications patient taking as of now reconciled against medications ordered at time of hospital discharge: Yes    A comprehensive review of systems was negative except for what was noted in the HPI. Objective:    Patient-Reported Vitals  No data recorded        Armin FarrisGregDamian was evaluated through a synchronous (real-time) audio-video encounter. The patient (or guardian if applicable) is aware that this is a billable service, which includes applicable co-pays. This Virtual Visit was conducted with patient's (and/or legal guardian's) consent. The visit was conducted pursuant to the emergency declaration under the [facility], [address] [place] [address] authority and the [employer] General Act. Patient identification was verified, and a caregiver was present when appropriate. The patient was located at Home: [address] [place] [address]  Provider was located at [facility] ([address] [place], [place] [address]t): [address] [place] [address]       An electronic signature was used to authenticate this note.   --Nigel Mark MD

## 2023-03-10 ENCOUNTER — TELEPHONE (OUTPATIENT)
Dept: PULMONOLOGY | Age: 68
End: 2023-03-10

## 2023-03-10 DIAGNOSIS — J84.9 ILD (INTERSTITIAL LUNG DISEASE) (HCC): Primary | ICD-10-CM

## 2023-03-10 RX ORDER — PIRFENIDONE 267 MG/1
TABLET, FILM COATED ORAL
Qty: 270 TABLET | Refills: 3 | Status: CANCELLED | OUTPATIENT
Start: 2023-03-10

## 2023-03-10 NOTE — TELEPHONE ENCOUNTER
Pt called stating she was in the ED last week and she thought someone told her dont get the PFT done next week and she states she cant remember and just wants to see if she needs it done still, I looked in the hospital notes and didn't see anything.

## 2023-03-10 NOTE — TELEPHONE ENCOUNTER
Matty called for refill and directions of Pirfenidone 267mg. Pharmacy states last time they filled was in August so not sure if she has been taking daily.

## 2023-03-14 ENCOUNTER — CARE COORDINATION (OUTPATIENT)
Dept: CASE MANAGEMENT | Age: 68
End: 2023-03-14

## 2023-03-14 NOTE — TELEPHONE ENCOUNTER
Message relayed to pt's daughter that PFT can wait 1 month. Advised that we'll reschedule it for her when she's here for her appt tomorrow.

## 2023-03-14 NOTE — CARE COORDINATION
[facility] Care Transitions Follow Up Call      Patient: Michelle Yan  Patient : 1955   MRN: [id number]  Reason for Admission: readmission from home -> acute on chronic resp failure with hypoxia, ILD with AE, recent hx cryptogenic organizing PNA, baseline 8L w/ ambulation and 4L at rest, PAF-no AC 2/2 recurrent epistaxis and chronic anemia, dCHF no AE, DM2, hypothyroidism, HLD, anxiety, severe PCM -> home with [facility], daughter is HHA through [place]  Discharge Date: 3/5/23 RARS: Readmission Risk Score: 36    CTN attempted follow-up outreach to patient. Message left including CTN contact information. Noting pulm appt tomorrow. Will follow up with patient at another time.     Follow Up  Future Appointments   Date Time Provider Calvin Mcmullen   3/15/2023 11:15 AM MD DURAN Norris [facility]   2023 10:00 AM MD Armand Miles [facility]   2023 10:00 AM PANDA Banks - GISELLA Alcantara [facility]     Coni Frankel, RN  Care Transition Nurse  [phone] mobile

## 2023-03-15 ENCOUNTER — OFFICE VISIT (OUTPATIENT)
Dept: PULMONOLOGY | Age: 68
End: 2023-03-15
Payer: MEDICARE

## 2023-03-15 VITALS
DIASTOLIC BLOOD PRESSURE: 54 MMHG | BODY MASS INDEX: 18.96 KG/M2 | HEART RATE: 93 BPM | WEIGHT: 107 LBS | HEIGHT: 63 IN | OXYGEN SATURATION: 95 % | SYSTOLIC BLOOD PRESSURE: 112 MMHG

## 2023-03-15 DIAGNOSIS — J84.9 ILD (INTERSTITIAL LUNG DISEASE) (HCC): Primary | ICD-10-CM

## 2023-03-15 PROCEDURE — 3078F DIAST BP <80 MM HG: CPT | Performed by: INTERNAL MEDICINE

## 2023-03-15 PROCEDURE — G8427 DOCREV CUR MEDS BY ELIG CLIN: HCPCS | Performed by: INTERNAL MEDICINE

## 2023-03-15 PROCEDURE — G8420 CALC BMI NORM PARAMETERS: HCPCS | Performed by: INTERNAL MEDICINE

## 2023-03-15 PROCEDURE — 3017F COLORECTAL CA SCREEN DOC REV: CPT | Performed by: INTERNAL MEDICINE

## 2023-03-15 PROCEDURE — 1090F PRES/ABSN URINE INCON ASSESS: CPT | Performed by: INTERNAL MEDICINE

## 2023-03-15 PROCEDURE — 3074F SYST BP LT 130 MM HG: CPT | Performed by: INTERNAL MEDICINE

## 2023-03-15 PROCEDURE — G8400 PT W/DXA NO RESULTS DOC: HCPCS | Performed by: INTERNAL MEDICINE

## 2023-03-15 PROCEDURE — G8484 FLU IMMUNIZE NO ADMIN: HCPCS | Performed by: INTERNAL MEDICINE

## 2023-03-15 PROCEDURE — 99214 OFFICE O/P EST MOD 30 MIN: CPT | Performed by: INTERNAL MEDICINE

## 2023-03-15 PROCEDURE — 1036F TOBACCO NON-USER: CPT | Performed by: INTERNAL MEDICINE

## 2023-03-15 PROCEDURE — 1111F DSCHRG MED/CURRENT MED MERGE: CPT | Performed by: INTERNAL MEDICINE

## 2023-03-15 PROCEDURE — 1123F ACP DISCUSS/DSCN MKR DOCD: CPT | Performed by: INTERNAL MEDICINE

## 2023-03-15 NOTE — PROGRESS NOTES
[facility]                                 Pulmonary Consult /Progress Note :                                                                  CHIEF COMPLAINT: SOB  Reason ILD with acute exacerbation. Hypoxia acute on chronic.         HPI:   Patient is 49-year-old female with extensive work-up for interstitial lung disease that include bronchoscopy with EBUS as well as cryobiopsy and reviewing her records showing most likely organizing pneumonia along with possibility of nonspecific interstitial pneumonia    She used to follow with Dr. Kishor Wilson and she was placed on pirfenidone    The patient denies any history of previously smoking    She uses 3 L of home oxygen and she noticed that her oxygen requirement has been increasing over the last few days along with shortness of breath and dyspnea on exertion    During the admission ,She was a placed on steroids in the past however she has not been taking any steroids for more than a year    Denies any sputum, most of her cough dry, denies any leg swelling, denies any history of travel or COVID exposure and more short winded    Denies any fever chills chest pain no hemoptysis    Today Visit    She was admitted end of November for covid and Pneumothorax  She was sent to [facility] for pleurodesis however pneumothorax was improved and she left home on pneumostat  that was removed on 12/13  She is on 4 L which is her base line   She on esbriet   She  is doing fine  Use Albuterol        Past Medical History:   Diagnosis Date    A-fib ([address]     Anxiety     COPD (chronic obstructive pulmonary disease) ([address]     Cryptogenic organizing pneumonia ([facility]     Depression     GERD (gastroesophageal reflux disease)     Hyperlipidemia     On home oxygen therapy     uses O2 3L prn    Rash     Thyroid disease     hypothyroidism       Past Surgical History:        Procedure Laterality Date    ARM SURGERY Left 3/2/2020    LEFT ULNAR NERVE DECOMPRESSION AT THE ELBOW performed by Davin Alexander MD at [address] 6/27/2019    EBUS WF W/ANES. performed by Mariluz Stafford MD at [address]  6/27/2019    BRONCHOSCOPY/TRANSBRONCHIAL NEEDLE BIOPSY performed by Mariluz Stafford MD at [address]  6/27/2019    BRONCHOSCOPY/TRANSBRONCHIAL LUNG BIOPSY performed by Mariluz Stafford MD at [address]  6/27/2019    BRONCHOSCOPY ALVEOLAR LAVAGE performed by Mariluz Stafford MD at [address]  07/10/2019    BRONCHOSCOPY N/A 7/10/2019    BRONCHOSCOPY ALVEOLAR LAVAGE performed by Brooks Chen MD at [address] Bilateral 08/06/2019    BRONCHOSCOPY N/A 8/6/2019    BRONCHOSCOPY ALVEOLAR LAVAGE performed by Luisa Maradiaga MD at [address] N/A 12/24/2019    BRONCHOSCOPY ALVEOLAR LAVAGE performed by Delmy Arango MD at [facility] N/A 8/25/2022    COLONOSCOPY POLYPECTOMY SNARE/COLD BIOPSY performed by Pino Rodriguez MD at [address] [facility]  9/15/2022    CT GUIDED CHEST TUBE 9/15/2022 [address] SCAN    CT INSERT CATH PLEURA W IMAGE  11/28/2022    CT INSERT CATH PLEURA W IMAGE 11/28/2022 Trever Henning MD [facility] CT SCAN    HYSTERECTOMY, TOTAL ABDOMINAL (CERVIX REMOVED)      partial       Allergies:  is allergic to penicillins, cefuroxime, doxycycline, imitrex [sumatriptan], meperidine, sulfa antibiotics, keflex [cephalexin], and tape [adhesive tape]. Social History:    TOBACCO:   reports that she has never smoked. She has never used smokeless tobacco.  ETOH:   reports no history of alcohol use.       Family History:       Problem Relation Age of Onset    Diabetes Mother     High Blood Pressure Mother     Asthma Sister     Other Father        Current Medications:    Current Outpatient Medications:     predniSONE (DELTASONE) 10 MG tablet, Take 4 tabs by mouth daily for 5 days, then 3 tabs daily for 5 days, then 2 tabs daily for 5 days, then 1 tab daily thereafter, Disp: 75 tablet, Rfl: 0    traMADol (ULTRAM) 50 MG tablet, Take 1-2 tablets by mouth every 6 hours as needed for Pain for up to 180 days.  Take lowest dose possible to manage pain, Disp: 240 tablet, Rfl: 5    potassium chloride (KLOR-CON M) 20 MEQ extended release tablet, Take 1 tablet by mouth daily, Disp: 30 tablet, Rfl: 1    sodium chloride (OCEAN) 0.65 % nasal spray, 1 spray by Nasal route in the morning and at bedtime, Disp: 1 each, Rfl: 3    guaiFENesin (MUCINEX) 600 MG extended release tablet, Take 600 mg by mouth as needed for Congestion, Disp: , Rfl:     hydrOXYzine HCl (ATARAX) 10 MG tablet, Take 10 mg by mouth 3 times daily as needed for Itching, Disp: , Rfl:     omeprazole (PRILOSEC) 40 MG delayed release capsule, Take 1 capsule by mouth every morning (before breakfast), Disp: 90 capsule, Rfl: 1    ondansetron (ZOFRAN) 4 MG tablet, Take 1 tablet by mouth 3 times daily as needed for Nausea or Vomiting, Disp: 30 tablet, Rfl: 2    levothyroxine (SYNTHROID) 125 MCG tablet, Take 1 tablet by mouth Daily TAKE 1 TABLET BY MOUTH EVERY DAY, Disp: 90 tablet, Rfl: 1    benzonatate (TESSALON) 200 MG capsule, TAKE 1 CAPSULE BY MOUTH 3 TIMES A DAY AS NEEDED FOR COUGH, Disp: 90 capsule, Rfl: 5    nadolol (CORGARD) 20 MG tablet, Take 1 tablet by mouth daily, Disp: 30 tablet, Rfl: 3    furosemide (LASIX) 40 MG tablet, Take 0.5 tablets by mouth See Admin Instructions Daily prn for edema, Disp: 60 tablet, Rfl: 3    albuterol sulfate HFA (PROVENTIL;VENTOLIN;PROAIR) 108 (90 Base) MCG/ACT inhaler, INHALE 2 PUFFS INTO THE LUNGS EVERY 6 HOURS AS NEEDED FOR WHEEZING, Disp: 3 each, Rfl: 1    atorvastatin (LIPITOR) 40 MG tablet, TAKE 1 TABLET EVERY DAY, Disp: 90 tablet, Rfl: 1    dilTIAZem (CARDIZEM CD) 240 MG extended release capsule, Take 1 capsule by mouth daily, Disp: 30 capsule, Rfl: 2    Multiple Vitamins-Minerals (WOMENS ONE DAILY PO), Take 1 tablet by mouth daily, Disp: , Rfl:     sertraline (ZOLOFT) 100 MG tablet, TAKE 2 TABLETS EVERY DAY, Disp: 180 tablet, Rfl: 1    mirtazapine (REMERON) 30 MG tablet, TAKE 1 TABLET EVERY NIGHT, Disp: 90 tablet, Rfl: 1    Pirfenidone 267 MG TABS, pirfenidone daily for a week and then BID for a week and then back to TID. (Patient taking differently: Indications: taking BID - makes her sick to take more frequently pirfenidone daily for a week and then BID for a week and then back to TID.), Disp: 270 tablet, Rfl: 3    LORazepam (ATIVAN) 0.5 MG tablet, Take 0.5 mg by mouth every 6 hours as needed for Anxiety. , Disp: , Rfl:     albuterol (PROVENTIL) (2.5 MG/3ML) 0.083% nebulizer solution, INHALE THE CONTENTS OF 1 VIAL VIA NEBULIZER EVERY 6 HOURS AS NEEDED FOR WHEEZING, Disp: 720 mL, Rfl: 1      REVIEW OF SYSTEMS:  Constitutional: Negative for fever  HENT: Negative for sore throat  Eyes: Negative for redness   Respiratory: + for dyspnea, cough  Cardiovascular: Negative for chest pain  Gastrointestinal: Negative for vomiting, diarrhea   Genitourinary: Negative for hematuria   Musculoskeletal: Negative for arthralgias   Skin: Negative for rash  Neurological: Negative for syncope  Hematological: Negative for adenopathy  Psychiatric/Behavorial: Negative for anxiety      Objective:   PHYSICAL EXAM:    Blood pressure (!) 112/54, pulse 93, height 5' 3\" (1.6 m), weight 107 lb (48.5 kg), SpO2 95 %, not currently breastfeeding.' on RA  Gen: No distress. Eyes: PERRL. No sclera icterus. No conjunctival injection. ENT: No discharge. Pharynx clear. Neck: Trachea midline. No obvious mass. Resp: Rhonchi bilaterally no clear Rales   CV: Regular rate. Regular rhythm. No murmur or rub. No edema. GI: Non-tender. Non-distended. No hernia. Skin: Warm and dry. No nodule on exposed extremities. Lymph: No cervical LAD. No supraclavicular LAD. M/S: No cyanosis. No joint deformity. No clubbing.    Neuro: Awake. Alert. Moves all four extremities. Psych: Oriented x 3. No anxiety. DATA reviewed by me:   CXR  CT       LABS/IMAGING:    CBC:  Lab Results   Component Value Date    WBC 5.9 03/03/2023    HGB 10.0 (L) 03/03/2023    HCT 31.1 (L) 03/03/2023    MCV 80.7 03/03/2023     03/03/2023    LYMPHOPCT 6.6 03/03/2023    RBC 3.85 (L) 03/03/2023    MCH 26.0 03/03/2023    MCHC 32.2 03/03/2023    RDW 15.5 (H) 03/03/2023    NEUTOPHILPCT 92.7 03/03/2023    MONOPCT 0.7 03/03/2023    BASOPCT 0.0 03/03/2023    NEUTROABS 5.5 03/03/2023    LYMPHSABS 0.4 (L) 03/03/2023    MONOSABS 0.0 03/03/2023    EOSABS 0.0 03/03/2023    BASOSABS 0.0 03/03/2023       Recent Labs     03/03/23  0502 03/02/23  1755 02/22/23  0548   WBC 5.9 8.1 7.0   HGB 10.0* 9.9* 10.0*   HCT 31.1* 31.3* 32.4*   MCV 80.7 79.2* 82.1    168 147       BMP:   No results for input(s): NA, K, CL, CO2, PHOS, BUN, CREATININE, CA in the last 72 hours. MG:   Lab Results   Component Value Date/Time    MG 1.90 02/22/2023 05:48 AM     Ca/Phos:   Lab Results   Component Value Date    CALCIUM 9.0 03/03/2023    PHOS 4.6 12/08/2022     LIVER PROFILE:   No results for input(s): AST, ALT, LIPASE, BILIDIR, BILITOT, ALKPHOS in the last 72 hours. Invalid input(s): AMYLASE,  ALB      PT/INR: No results for input(s): PROTIME, INR in the last 72 hours. APTT: No results for input(s): APTT in the last 72 hours.     Cardiac Enzymes:  Lab Results   Component Value Date    CKTOTAL 23 (L) 07/10/2019    TROPONINI <0.01 03/02/2023       Assessment:       -Based on the biopsies, seems h/o of cryptogenic organizing pneumonia and possibly coexisting NSIP or chronic HP.    -Acute exacerbation of ILD   -History of COPD  -Chronic respiratory failure-      Plan:      *-keep  pirfenidone 1 tab tid to increase gradually   *s/p Pneumothostat with recurrent pneumo,CTS felt no pleurodeisis needed   *- on albuterol as needed  *- will see in 3 months   She was asked to go ER with any worsen SOB or LEE  She want to be evaluated by transplant clinic       Thank you very much for allowing me to participate in the care of this pleasant patient , should you have any questions ,please do not hesitate to contact me      Jacqueline Davis MD,[facility]  Pulmonary&Critical Care Medicine    Adelso De    NOTE: This report was transcribed using voice recognition software. Every effort was made to ensure accuracy; however, inadvertent computerized transcription errors may be present.

## 2023-03-16 ENCOUNTER — CARE COORDINATION (OUTPATIENT)
Dept: CASE MANAGEMENT | Age: 68
End: 2023-03-16

## 2023-03-16 NOTE — CARE COORDINATION
[facility] Care Transitions Follow Up Call    Patient Current Location: Home: [address] [place] [address]    Care Transition Nurse contacted the family by telephone to follow up after recent hospital admission. Patient: Franky Obando  Patient : 1955   MRN: [id number]  Reason for Admission: readmission from home -> acute on chronic resp failure with hypoxia, ILD with AE, recent hx cryptogenic organizing PNA, baseline 8L w/ ambulation and 4L at rest, PAF-no AC 2/2 recurrent epistaxis and chronic anemia, dCHF no AE, DM2, hypothyroidism, HLD, anxiety, severe PCM -> home [facility], daughter is HHA through [place]  Discharge Date: 3/5/23 RARS: Readmission Risk Score: 36      Needs to be reviewed by the provider   Additional needs identified to be addressed with provider: No         Method of communication with provider: none. Spoke with anastasiar/JOSSE Acevedo. Reports patient is doing well. Had OV with pulm yesterday. Referral to [address] [place] [address] transplant clinic to see if candidate for lung transplant. Denies needs. Addressed changes since last contact:  OV w/ pulm  Discussed follow-up appointments. If no appointment was previously scheduled, appointment scheduling offered: No.   Is follow up appointment scheduled within 7 days of discharge? completed. Follow Up  Future Appointments   Date Time Provider Calvin Mcmullen   2023 10:00 AM MD Armand Brown [facility]   2023 10:00 AM PANDA Cabrera - GISELLA Guzman [facility]   2023 11:30 [facility] - [facility]    Care Transition Nurse reviewed medical action plan and red flags with family and discussed any barriers to care and/or understanding of plan of care after discharge.  Discussed appropriate site of care based on symptoms and resources available to patient including: PCP  Specialist. The family agrees to contact the PCP office for questions related to their healthcare. Patients top risk factors for readmission: functional physical ability, medical condition-see above, polypharmacy, and utilization of services  Interventions to address risk factors: Education of patient/family/caregiver/guardian to support self-management-monitor and report symptoms; f/up as scheduled    Offered patient enrollment in the Remote Patient Monitoring (RPM) program for in-home monitoring: Patient is not eligible for RPM program.     Care Transition Nurse provided contact information for future needs. Plan for follow-up call in 5-7 days based on severity of symptoms and risk factors.   Plan for next call: symptom management-breathing    Mervin Newberry RN  Care Transition Nurse  [phone] mobile

## 2023-03-17 NOTE — TELEPHONE ENCOUNTER
Martina with [facility] called stating that the last [address] script was sent in on this pt August 2022 for 3 tabs TID. Pharmacist says pt indicated that dose has changed and they need a new script indicating change of directions. New script pending.

## 2023-03-19 RX ORDER — PIRFENIDONE 267 MG/1
TABLET, FILM COATED ORAL
Qty: 90 TABLET | Refills: 4 | Status: ON HOLD | OUTPATIENT
Start: 2023-03-19

## 2023-03-20 RX ORDER — PIRFENIDONE 267 MG/1
1 TABLET, FILM COATED ORAL 3 TIMES DAILY
Qty: 270 TABLET | Refills: 1 | Status: ON HOLD | OUTPATIENT
Start: 2023-03-20

## 2023-03-21 ENCOUNTER — TELEPHONE (OUTPATIENT)
Dept: FAMILY MEDICINE CLINIC | Age: 68
End: 2023-03-21

## 2023-03-21 RX ORDER — DILTIAZEM HYDROCHLORIDE 240 MG/1
240 CAPSULE, COATED, EXTENDED RELEASE ORAL DAILY
Qty: 30 CAPSULE | Refills: 2 | Status: ON HOLD | OUTPATIENT
Start: 2023-03-21

## 2023-03-22 NOTE — TELEPHONE ENCOUNTER
Received fax from [facility] stating pirfenidone 275 mg tabs have been approved through 12/31/23. Also received another fax stating this drug is only approved for 30-day refills (not approved for 90-day refills).

## 2023-03-23 ENCOUNTER — CARE COORDINATION (OUTPATIENT)
Dept: CASE MANAGEMENT | Age: 68
End: 2023-03-23

## 2023-03-23 NOTE — CARE COORDINATION
[facility] Care Transitions Follow Up Call      Patient: Kar Jones  Patient : 1955   MRN: [id number]  Reason for Admission: readmission from home -> acute on chronic resp failure with hypoxia, ILD with AE, recent hx cryptogenic organizing PNA, baseline 8L w/ ambulation and 4L at rest, PAF-no AC 2/2 recurrent epistaxis and chronic anemia, dCHF no AE, DM2, hypothyroidism, HLD, anxiety, severe PCM -> home with [facility], daughter is HHA through [place]  Discharge Date: 3/5/23 RARS: Readmission Risk Score: 36    CTN attempted follow-up outreach to patient. Message left including CTN contact information.      Follow Up  Future Appointments   Date Time Provider Calvin Mcmullen   2023 10:00 AM Matthew Gutierrez MD [facility]   2023 10:00 AM PANDA Walker - GISELLA Coon [facility]   2023 11:30 AM Ani Alcaraz MD [facility] P[facility]     Osbaldo Mccurdy, RN  Care Transition Nurse  [phone] mobile

## 2023-03-24 ENCOUNTER — HOSPITAL ENCOUNTER (INPATIENT)
Age: 68
LOS: 3 days | Discharge: HOME HEALTH | DRG: 196 | End: 2023-03-28
Attending: STUDENT IN AN ORGANIZED HEALTH CARE EDUCATION/TRAINING PROGRAM | Admitting: INTERNAL MEDICINE
Payer: MEDICARE

## 2023-03-24 ENCOUNTER — APPOINTMENT (OUTPATIENT)
Dept: GENERAL RADIOLOGY | Age: 68
DRG: 196 | End: 2023-03-24
Payer: MEDICARE

## 2023-03-24 DIAGNOSIS — R79.89 ELEVATED LACTIC ACID LEVEL: ICD-10-CM

## 2023-03-24 DIAGNOSIS — J98.4 RESTRICTIVE LUNG DISEASE: ICD-10-CM

## 2023-03-24 DIAGNOSIS — J44.9 CHRONIC OBSTRUCTIVE PULMONARY DISEASE, UNSPECIFIED COPD TYPE (HCC): ICD-10-CM

## 2023-03-24 DIAGNOSIS — J96.21 ACUTE ON CHRONIC RESPIRATORY FAILURE WITH HYPOXIA (HCC): Primary | ICD-10-CM

## 2023-03-24 DIAGNOSIS — J18.9 PNEUMONIA OF BOTH LUNGS DUE TO INFECTIOUS ORGANISM, UNSPECIFIED PART OF LUNG: ICD-10-CM

## 2023-03-24 LAB
ALBUMIN SERPL-MCNC: 3.6 G/DL (ref 3.4–5)
ALBUMIN/GLOB SERPL: 1.4 {RATIO} (ref 1.1–2.2)
ALP SERPL-CCNC: 86 U/L (ref 40–129)
ALT SERPL-CCNC: 27 U/L (ref 10–40)
ANION GAP SERPL CALCULATED.3IONS-SCNC: 6 MMOL/L (ref 3–16)
AST SERPL-CCNC: 30 U/L (ref 15–37)
BASE EXCESS BLDV CALC-SCNC: 8.3 MMOL/L (ref -3–3)
BASOPHILS # BLD: 0 K/UL (ref 0–0.2)
BASOPHILS NFR BLD: 0.4 %
BILIRUB SERPL-MCNC: <0.2 MG/DL (ref 0–1)
BUN SERPL-MCNC: 18 MG/DL (ref 7–20)
CALCIUM SERPL-MCNC: 8.9 MG/DL (ref 8.3–10.6)
CHLORIDE SERPL-SCNC: 101 MMOL/L (ref 99–110)
CO2 BLDV-SCNC: 35 MMOL/L
CO2 SERPL-SCNC: 38 MMOL/L (ref 21–32)
COHGB MFR BLDV: 1.9 % (ref 0–1.5)
CREAT SERPL-MCNC: <0.5 MG/DL (ref 0.6–1.2)
D DIMER: 0.27 UG/ML FEU (ref 0–0.6)
DEPRECATED RDW RBC AUTO: 15.6 % (ref 12.4–15.4)
EOSINOPHIL # BLD: 0 K/UL (ref 0–0.6)
EOSINOPHIL NFR BLD: 0.2 %
FLUAV RNA UPPER RESP QL NAA+PROBE: NEGATIVE
FLUBV AG NPH QL: NEGATIVE
GFR SERPLBLD CREATININE-BSD FMLA CKD-EPI: >60 ML/MIN/{1.73_M2}
GLUCOSE SERPL-MCNC: 230 MG/DL (ref 70–99)
HCO3 BLDV-SCNC: 33.3 MMOL/L (ref 23–29)
HCT VFR BLD AUTO: 28.9 % (ref 36–48)
HGB BLD-MCNC: 9.1 G/DL (ref 12–16)
LACTATE BLDV-SCNC: 1.6 MMOL/L (ref 0.4–2)
LACTATE BLDV-SCNC: 2.4 MMOL/L (ref 0.4–2)
LYMPHOCYTES # BLD: 0.5 K/UL (ref 1–5.1)
LYMPHOCYTES NFR BLD: 6.6 %
MCH RBC QN AUTO: 24.8 PG (ref 26–34)
MCHC RBC AUTO-ENTMCNC: 31.4 G/DL (ref 31–36)
MCV RBC AUTO: 79 FL (ref 80–100)
METHGB MFR BLDV: 0.3 %
MONOCYTES # BLD: 0.2 K/UL (ref 0–1.3)
MONOCYTES NFR BLD: 2.5 %
NEUTROPHILS # BLD: 6.5 K/UL (ref 1.7–7.7)
NEUTROPHILS NFR BLD: 90.3 %
NT-PROBNP SERPL-MCNC: 608 PG/ML (ref 0–124)
O2 THERAPY: ABNORMAL
PCO2 BLDV: 48.9 MMHG (ref 40–50)
PH BLDV: 7.45 [PH] (ref 7.35–7.45)
PLATELET # BLD AUTO: 148 K/UL (ref 135–450)
PMV BLD AUTO: 7.8 FL (ref 5–10.5)
PO2 BLDV: 49.9 MMHG (ref 25–40)
POTASSIUM SERPL-SCNC: 3.9 MMOL/L (ref 3.5–5.1)
PROT SERPL-MCNC: 6.2 G/DL (ref 6.4–8.2)
RBC # BLD AUTO: 3.65 M/UL (ref 4–5.2)
SAO2 % BLDV: 87 %
SARS-COV-2 RDRP RESP QL NAA+PROBE: NOT DETECTED
SODIUM SERPL-SCNC: 145 MMOL/L (ref 136–145)
TROPONIN T SERPL-MCNC: <0.01 NG/ML
WBC # BLD AUTO: 7.2 K/UL (ref 4–11)

## 2023-03-24 PROCEDURE — 83880 ASSAY OF NATRIURETIC PEPTIDE: CPT

## 2023-03-24 PROCEDURE — 94761 N-INVAS EAR/PLS OXIMETRY MLT: CPT

## 2023-03-24 PROCEDURE — 87635 SARS-COV-2 COVID-19 AMP PRB: CPT

## 2023-03-24 PROCEDURE — 6370000000 HC RX 637 (ALT 250 FOR IP): Performed by: STUDENT IN AN ORGANIZED HEALTH CARE EDUCATION/TRAINING PROGRAM

## 2023-03-24 PROCEDURE — 96365 THER/PROPH/DIAG IV INF INIT: CPT

## 2023-03-24 PROCEDURE — 84145 PROCALCITONIN (PCT): CPT

## 2023-03-24 PROCEDURE — 80053 COMPREHEN METABOLIC PANEL: CPT

## 2023-03-24 PROCEDURE — 83605 ASSAY OF LACTIC ACID: CPT

## 2023-03-24 PROCEDURE — 71045 X-RAY EXAM CHEST 1 VIEW: CPT

## 2023-03-24 PROCEDURE — 82803 BLOOD GASES ANY COMBINATION: CPT

## 2023-03-24 PROCEDURE — 93005 ELECTROCARDIOGRAM TRACING: CPT | Performed by: STUDENT IN AN ORGANIZED HEALTH CARE EDUCATION/TRAINING PROGRAM

## 2023-03-24 PROCEDURE — 96375 TX/PRO/DX INJ NEW DRUG ADDON: CPT

## 2023-03-24 PROCEDURE — 6360000002 HC RX W HCPCS: Performed by: STUDENT IN AN ORGANIZED HEALTH CARE EDUCATION/TRAINING PROGRAM

## 2023-03-24 PROCEDURE — 99285 EMERGENCY DEPT VISIT HI MDM: CPT

## 2023-03-24 PROCEDURE — 2580000003 HC RX 258: Performed by: STUDENT IN AN ORGANIZED HEALTH CARE EDUCATION/TRAINING PROGRAM

## 2023-03-24 PROCEDURE — 2700000000 HC OXYGEN THERAPY PER DAY

## 2023-03-24 PROCEDURE — 84484 ASSAY OF TROPONIN QUANT: CPT

## 2023-03-24 PROCEDURE — 85025 COMPLETE CBC W/AUTO DIFF WBC: CPT

## 2023-03-24 PROCEDURE — 36415 COLL VENOUS BLD VENIPUNCTURE: CPT

## 2023-03-24 PROCEDURE — 85379 FIBRIN DEGRADATION QUANT: CPT

## 2023-03-24 PROCEDURE — 96366 THER/PROPH/DIAG IV INF ADDON: CPT

## 2023-03-24 PROCEDURE — 87804 INFLUENZA ASSAY W/OPTIC: CPT

## 2023-03-24 RX ORDER — METHYLPREDNISOLONE SODIUM SUCCINATE 125 MG/2ML
125 INJECTION, POWDER, LYOPHILIZED, FOR SOLUTION INTRAMUSCULAR; INTRAVENOUS ONCE
Status: COMPLETED | OUTPATIENT
Start: 2023-03-24 | End: 2023-03-24

## 2023-03-24 RX ORDER — LEVOFLOXACIN 5 MG/ML
750 INJECTION, SOLUTION INTRAVENOUS ONCE
Status: COMPLETED | OUTPATIENT
Start: 2023-03-24 | End: 2023-03-24

## 2023-03-24 RX ORDER — 0.9 % SODIUM CHLORIDE 0.9 %
500 INTRAVENOUS SOLUTION INTRAVENOUS ONCE
Status: COMPLETED | OUTPATIENT
Start: 2023-03-24 | End: 2023-03-25

## 2023-03-24 RX ORDER — IPRATROPIUM BROMIDE AND ALBUTEROL SULFATE 2.5; .5 MG/3ML; MG/3ML
1 SOLUTION RESPIRATORY (INHALATION)
Status: DISCONTINUED | OUTPATIENT
Start: 2023-03-24 | End: 2023-03-25

## 2023-03-24 RX ADMIN — LEVOFLOXACIN 750 MG: 5 INJECTION, SOLUTION INTRAVENOUS at 20:56

## 2023-03-24 RX ADMIN — METHYLPREDNISOLONE SODIUM SUCCINATE 125 MG: 125 INJECTION, POWDER, FOR SOLUTION INTRAMUSCULAR; INTRAVENOUS at 20:37

## 2023-03-24 RX ADMIN — SODIUM CHLORIDE 500 ML: 9 INJECTION, SOLUTION INTRAVENOUS at 22:07

## 2023-03-24 RX ADMIN — IPRATROPIUM BROMIDE AND ALBUTEROL SULFATE 1 AMPULE: .5; 2.5 SOLUTION RESPIRATORY (INHALATION) at 20:37

## 2023-03-24 ASSESSMENT — PAIN - FUNCTIONAL ASSESSMENT: PAIN_FUNCTIONAL_ASSESSMENT: NONE - DENIES PAIN

## 2023-03-24 NOTE — ED PROVIDER NOTES
Result Value Ref Range    SARS-CoV-2, NAAT Not Detected Not Detected   Rapid influenza A/B antigens    Specimen: Nasopharyngeal   Result Value Ref Range    Rapid Influenza A Ag Negative Negative    Rapid Influenza B Ag Negative Negative   CBC with Auto Differential   Result Value Ref Range    WBC 7.2 4.0 - 11.0 K/uL    RBC 3.65 (L) 4.00 - 5.20 M/uL    Hemoglobin 9.1 (L) 12.0 - 16.0 g/dL    Hematocrit 28.9 (L) 36.0 - 48.0 %    MCV 79.0 (L) 80.0 - 100.0 fL    MCH 24.8 (L) 26.0 - 34.0 pg    MCHC 31.4 31.0 - 36.0 g/dL    RDW 15.6 (H) 12.4 - 15.4 %    Platelets 148 135 - 450 K/uL    MPV 7.8 5.0 - 10.5 fL    Neutrophils % 90.3 %    Lymphocytes % 6.6 %    Monocytes % 2.5 %    Eosinophils % 0.2 %    Basophils % 0.4 %    Neutrophils Absolute 6.5 1.7 - 7.7 K/uL    Lymphocytes Absolute 0.5 (L) 1.0 - 5.1 K/uL    Monocytes Absolute 0.2 0.0 - 1.3 K/uL    Eosinophils Absolute 0.0 0.0 - 0.6 K/uL    Basophils Absolute 0.0 0.0 - 0.2 K/uL   CMP w/ Reflex to MG   Result Value Ref Range    Sodium 145 136 - 145 mmol/L    Potassium reflex Magnesium 3.9 3.5 - 5.1 mmol/L    Chloride 101 99 - 110 mmol/L    CO2 38 (H) 21 - 32 mmol/L    Anion Gap 6 3 - 16    Glucose 230 (H) 70 - 99 mg/dL    BUN 18 7 - 20 mg/dL    Creatinine <0.5 (L) 0.6 - 1.2 mg/dL    Est, Glom Filt Rate >60 >60    Calcium 8.9 8.3 - 10.6 mg/dL    Total Protein 6.2 (L) 6.4 - 8.2 g/dL    Albumin 3.6 3.4 - 5.0 g/dL    Albumin/Globulin Ratio 1.4 1.1 - 2.2    Total Bilirubin <0.2 0.0 - 1.0 mg/dL    Alkaline Phosphatase 86 40 - 129 U/L    ALT 27 10 - 40 U/L    AST 30 15 - 37 U/L   Blood Gas, Venous   Result Value Ref Range    pH, Jasper 7.451 (H) 7.350 - 7.450    pCO2, [place] 48.9 40.0 - 50.0 mmHg    pO2, [place] 49.9 (H) 25.0 - 40.0 mmHg    HCO3, Venous 33.3 (H) 23.0 - 29.0 mmol/L    Base Excess, [place] 8.3 (H) -3.0 - 3.0 mmol/L    O2 Sat, [place] 87 Not Established %    Carboxyhemoglobin 1.9 (H) 0.0 - 1.5 %    MetHgb, [place] 0.3 <1.5 %    TC02 (Calc), [place] 35 Not Established mmol/L    O2 Therapy

## 2023-03-25 ENCOUNTER — APPOINTMENT (OUTPATIENT)
Dept: GENERAL RADIOLOGY | Age: 68
DRG: 196 | End: 2023-03-25
Payer: MEDICARE

## 2023-03-25 LAB
ALBUMIN SERPL-MCNC: 3.6 G/DL (ref 3.4–5)
ALBUMIN/GLOB SERPL: 2 {RATIO} (ref 1.1–2.2)
ALP SERPL-CCNC: 75 U/L (ref 40–129)
ALT SERPL-CCNC: 23 U/L (ref 10–40)
ANION GAP SERPL CALCULATED.3IONS-SCNC: 11 MMOL/L (ref 3–16)
AST SERPL-CCNC: 21 U/L (ref 15–37)
BASE EXCESS BLDA CALC-SCNC: 4.2 MMOL/L (ref -3–3)
BASOPHILS # BLD: 0 K/UL (ref 0–0.2)
BASOPHILS NFR BLD: 0.1 %
BILIRUB SERPL-MCNC: <0.2 MG/DL (ref 0–1)
BUN SERPL-MCNC: 14 MG/DL (ref 7–20)
CA-I BLD-SCNC: 1.14 MMOL/L (ref 1.12–1.32)
CALCIUM SERPL-MCNC: 9.4 MG/DL (ref 8.3–10.6)
CHLORIDE SERPL-SCNC: 99 MMOL/L (ref 99–110)
CO2 BLDA-SCNC: 31 MMOL/L
CO2 SERPL-SCNC: 31 MMOL/L (ref 21–32)
COHGB MFR BLDA: 0.3 % (ref 0–1.5)
CREAT SERPL-MCNC: <0.5 MG/DL (ref 0.6–1.2)
DEPRECATED RDW RBC AUTO: 15.9 % (ref 12.4–15.4)
EKG ATRIAL RATE: 78 BPM
EKG DIAGNOSIS: NORMAL
EKG P AXIS: -4 DEGREES
EKG P-R INTERVAL: 146 MS
EKG Q-T INTERVAL: 392 MS
EKG QRS DURATION: 80 MS
EKG QTC CALCULATION (BAZETT): 446 MS
EKG R AXIS: -7 DEGREES
EKG T AXIS: -17 DEGREES
EKG VENTRICULAR RATE: 78 BPM
EOSINOPHIL # BLD: 0 K/UL (ref 0–0.6)
EOSINOPHIL NFR BLD: 0 %
GFR SERPLBLD CREATININE-BSD FMLA CKD-EPI: >60 ML/MIN/{1.73_M2}
GLUCOSE BLD-MCNC: 247 MG/DL (ref 70–99)
GLUCOSE SERPL-MCNC: 270 MG/DL (ref 70–99)
HCO3 BLDA-SCNC: 29.5 MMOL/L (ref 21–29)
HCT VFR BLD AUTO: 26.7 % (ref 36–48)
HGB BLD-MCNC: 8.1 G/DL (ref 12–16)
HGB BLDA-MCNC: 9.4 G/DL (ref 12–16)
LYMPHOCYTES # BLD: 0.3 K/UL (ref 1–5.1)
LYMPHOCYTES NFR BLD: 2.3 %
MAGNESIUM SERPL-MCNC: 1.8 MG/DL (ref 1.8–2.4)
MCH RBC QN AUTO: 24.7 PG (ref 26–34)
MCHC RBC AUTO-ENTMCNC: 30.3 G/DL (ref 31–36)
MCV RBC AUTO: 81.6 FL (ref 80–100)
METHGB MFR BLDA: 0.2 %
MONOCYTES # BLD: 0.3 K/UL (ref 0–1.3)
MONOCYTES NFR BLD: 2.8 %
NEUTROPHILS # BLD: 10.6 K/UL (ref 1.7–7.7)
NEUTROPHILS NFR BLD: 94.8 %
O2 THERAPY: ABNORMAL
PCO2 BLDA: 47.7 MMHG (ref 35–45)
PERFORMED ON: ABNORMAL
PH BLDA: 7.41 [PH] (ref 7.35–7.45)
PH BLDV: 7.41 [PH] (ref 7.35–7.45)
PHOSPHATE SERPL-MCNC: 1.8 MG/DL (ref 2.5–4.9)
PLATELET # BLD AUTO: 143 K/UL (ref 135–450)
PMV BLD AUTO: 7.4 FL (ref 5–10.5)
PO2 BLDA: 94.9 MMHG (ref 75–108)
POTASSIUM SERPL-SCNC: 3.2 MMOL/L (ref 3.5–5.1)
PROCALCITONIN SERPL IA-MCNC: 0.07 NG/ML (ref 0–0.15)
PROT SERPL-MCNC: 5.4 G/DL (ref 6.4–8.2)
RBC # BLD AUTO: 3.27 M/UL (ref 4–5.2)
SAO2 % BLDA: 97.2 %
SODIUM SERPL-SCNC: 141 MMOL/L (ref 136–145)
WBC # BLD AUTO: 11.2 K/UL (ref 4–11)

## 2023-03-25 PROCEDURE — 6370000000 HC RX 637 (ALT 250 FOR IP): Performed by: INTERNAL MEDICINE

## 2023-03-25 PROCEDURE — 82803 BLOOD GASES ANY COMBINATION: CPT

## 2023-03-25 PROCEDURE — 84100 ASSAY OF PHOSPHORUS: CPT

## 2023-03-25 PROCEDURE — 0202U NFCT DS 22 TRGT SARS-COV-2: CPT

## 2023-03-25 PROCEDURE — 71045 X-RAY EXAM CHEST 1 VIEW: CPT

## 2023-03-25 PROCEDURE — 2000000000 HC ICU R&B

## 2023-03-25 PROCEDURE — 2580000003 HC RX 258: Performed by: INTERNAL MEDICINE

## 2023-03-25 PROCEDURE — 82330 ASSAY OF CALCIUM: CPT

## 2023-03-25 PROCEDURE — 6360000002 HC RX W HCPCS: Performed by: INTERNAL MEDICINE

## 2023-03-25 PROCEDURE — 2700000000 HC OXYGEN THERAPY PER DAY

## 2023-03-25 PROCEDURE — 99223 1ST HOSP IP/OBS HIGH 75: CPT | Performed by: INTERNAL MEDICINE

## 2023-03-25 PROCEDURE — 2500000003 HC RX 250 WO HCPCS: Performed by: INTERNAL MEDICINE

## 2023-03-25 PROCEDURE — 94640 AIRWAY INHALATION TREATMENT: CPT

## 2023-03-25 PROCEDURE — 94761 N-INVAS EAR/PLS OXIMETRY MLT: CPT

## 2023-03-25 PROCEDURE — 85025 COMPLETE CBC W/AUTO DIFF WBC: CPT

## 2023-03-25 PROCEDURE — 80053 COMPREHEN METABOLIC PANEL: CPT

## 2023-03-25 PROCEDURE — 36415 COLL VENOUS BLD VENIPUNCTURE: CPT

## 2023-03-25 PROCEDURE — 83735 ASSAY OF MAGNESIUM: CPT

## 2023-03-25 PROCEDURE — 83036 HEMOGLOBIN GLYCOSYLATED A1C: CPT

## 2023-03-25 PROCEDURE — 93010 ELECTROCARDIOGRAM REPORT: CPT | Performed by: INTERNAL MEDICINE

## 2023-03-25 RX ORDER — IPRATROPIUM BROMIDE AND ALBUTEROL SULFATE 2.5; .5 MG/3ML; MG/3ML
1 SOLUTION RESPIRATORY (INHALATION) EVERY 4 HOURS PRN
Status: DISCONTINUED | OUTPATIENT
Start: 2023-03-25 | End: 2023-03-27

## 2023-03-25 RX ORDER — LORAZEPAM 0.5 MG/1
0.5 TABLET ORAL EVERY 6 HOURS PRN
Status: DISCONTINUED | OUTPATIENT
Start: 2023-03-25 | End: 2023-03-27

## 2023-03-25 RX ORDER — FAMOTIDINE 20 MG/1
20 TABLET, FILM COATED ORAL 2 TIMES DAILY
Status: DISCONTINUED | OUTPATIENT
Start: 2023-03-25 | End: 2023-03-27

## 2023-03-25 RX ORDER — LORAZEPAM 2 MG/ML
1 INJECTION INTRAMUSCULAR ONCE
Status: DISCONTINUED | OUTPATIENT
Start: 2023-03-25 | End: 2023-03-27

## 2023-03-25 RX ORDER — HYDROXYZINE HYDROCHLORIDE 10 MG/1
10 TABLET, FILM COATED ORAL 3 TIMES DAILY PRN
Status: DISCONTINUED | OUTPATIENT
Start: 2023-03-25 | End: 2023-03-27

## 2023-03-25 RX ORDER — DEXTROSE MONOHYDRATE 100 MG/ML
INJECTION, SOLUTION INTRAVENOUS CONTINUOUS PRN
Status: DISCONTINUED | OUTPATIENT
Start: 2023-03-25 | End: 2023-03-27

## 2023-03-25 RX ORDER — LEVOTHYROXINE SODIUM 0.12 MG/1
125 TABLET ORAL DAILY
Status: DISCONTINUED | OUTPATIENT
Start: 2023-03-25 | End: 2023-03-27

## 2023-03-25 RX ORDER — SERTRALINE HYDROCHLORIDE 100 MG/1
100 TABLET, FILM COATED ORAL DAILY
Status: DISCONTINUED | OUTPATIENT
Start: 2023-03-25 | End: 2023-03-27

## 2023-03-25 RX ORDER — IPRATROPIUM BROMIDE AND ALBUTEROL SULFATE 2.5; .5 MG/3ML; MG/3ML
1 SOLUTION RESPIRATORY (INHALATION) 3 TIMES DAILY
Status: DISCONTINUED | OUTPATIENT
Start: 2023-03-25 | End: 2023-03-27

## 2023-03-25 RX ORDER — INSULIN LISPRO 100 [IU]/ML
INJECTION, SOLUTION INTRAVENOUS; SUBCUTANEOUS
Status: COMPLETED
Start: 2023-03-25 | End: 2023-03-26

## 2023-03-25 RX ORDER — ATORVASTATIN CALCIUM 40 MG/1
40 TABLET, FILM COATED ORAL DAILY
Status: DISCONTINUED | OUTPATIENT
Start: 2023-03-25 | End: 2023-03-27

## 2023-03-25 RX ORDER — IPRATROPIUM BROMIDE AND ALBUTEROL SULFATE 2.5; .5 MG/3ML; MG/3ML
1 SOLUTION RESPIRATORY (INHALATION)
Status: DISCONTINUED | OUTPATIENT
Start: 2023-03-25 | End: 2023-03-25

## 2023-03-25 RX ORDER — DILTIAZEM HYDROCHLORIDE 240 MG/1
240 CAPSULE, COATED, EXTENDED RELEASE ORAL DAILY
Status: DISCONTINUED | OUTPATIENT
Start: 2023-03-25 | End: 2023-03-27

## 2023-03-25 RX ORDER — POTASSIUM CHLORIDE 7.45 MG/ML
10 INJECTION INTRAVENOUS PRN
Status: DISCONTINUED | OUTPATIENT
Start: 2023-03-25 | End: 2023-03-27

## 2023-03-25 RX ORDER — POLYETHYLENE GLYCOL 3350 17 G/17G
17 POWDER, FOR SOLUTION ORAL DAILY PRN
Status: DISCONTINUED | OUTPATIENT
Start: 2023-03-25 | End: 2023-03-27

## 2023-03-25 RX ORDER — LEVOFLOXACIN 5 MG/ML
750 INJECTION, SOLUTION INTRAVENOUS EVERY 24 HOURS
Status: DISCONTINUED | OUTPATIENT
Start: 2023-03-25 | End: 2023-03-27

## 2023-03-25 RX ORDER — NADOLOL 40 MG/1
20 TABLET ORAL DAILY
Status: DISCONTINUED | OUTPATIENT
Start: 2023-03-25 | End: 2023-03-27

## 2023-03-25 RX ORDER — FUROSEMIDE 20 MG/1
20 TABLET ORAL SEE ADMIN INSTRUCTIONS
Status: DISCONTINUED | OUTPATIENT
Start: 2023-03-25 | End: 2023-03-27

## 2023-03-25 RX ORDER — MAGNESIUM SULFATE IN WATER 40 MG/ML
2000 INJECTION, SOLUTION INTRAVENOUS ONCE
Status: COMPLETED | OUTPATIENT
Start: 2023-03-25 | End: 2023-03-26

## 2023-03-25 RX ORDER — ENOXAPARIN SODIUM 100 MG/ML
30 INJECTION SUBCUTANEOUS DAILY
Status: DISCONTINUED | OUTPATIENT
Start: 2023-03-25 | End: 2023-03-27

## 2023-03-25 RX ORDER — ACETAMINOPHEN 325 MG/1
650 TABLET ORAL EVERY 6 HOURS PRN
Status: DISCONTINUED | OUTPATIENT
Start: 2023-03-25 | End: 2023-03-27

## 2023-03-25 RX ORDER — SODIUM CHLORIDE 9 MG/ML
INJECTION, SOLUTION INTRAVENOUS PRN
Status: DISCONTINUED | OUTPATIENT
Start: 2023-03-25 | End: 2023-03-27

## 2023-03-25 RX ORDER — ACETAMINOPHEN 650 MG/1
650 SUPPOSITORY RECTAL EVERY 6 HOURS PRN
Status: DISCONTINUED | OUTPATIENT
Start: 2023-03-25 | End: 2023-03-27

## 2023-03-25 RX ORDER — IPRATROPIUM BROMIDE AND ALBUTEROL SULFATE 2.5; .5 MG/3ML; MG/3ML
1 SOLUTION RESPIRATORY (INHALATION) 4 TIMES DAILY
Status: DISCONTINUED | OUTPATIENT
Start: 2023-03-25 | End: 2023-03-25

## 2023-03-25 RX ORDER — PIRFENIDONE 267 MG/1
1 TABLET, FILM COATED ORAL 3 TIMES DAILY
Status: DISCONTINUED | OUTPATIENT
Start: 2023-03-25 | End: 2023-03-26 | Stop reason: RX

## 2023-03-25 RX ORDER — BENZONATATE 100 MG/1
100 CAPSULE ORAL EVERY 4 HOURS PRN
Status: DISCONTINUED | OUTPATIENT
Start: 2023-03-25 | End: 2023-03-27

## 2023-03-25 RX ORDER — ONDANSETRON 2 MG/ML
4 INJECTION INTRAMUSCULAR; INTRAVENOUS EVERY 6 HOURS PRN
Status: DISCONTINUED | OUTPATIENT
Start: 2023-03-25 | End: 2023-03-27

## 2023-03-25 RX ORDER — SODIUM CHLORIDE 0.9 % (FLUSH) 0.9 %
5-40 SYRINGE (ML) INJECTION EVERY 12 HOURS SCHEDULED
Status: DISCONTINUED | OUTPATIENT
Start: 2023-03-25 | End: 2023-03-27

## 2023-03-25 RX ORDER — INSULIN LISPRO 100 [IU]/ML
0-4 INJECTION, SOLUTION INTRAVENOUS; SUBCUTANEOUS EVERY 4 HOURS
Status: DISCONTINUED | OUTPATIENT
Start: 2023-03-25 | End: 2023-03-26

## 2023-03-25 RX ORDER — MIRTAZAPINE 15 MG/1
30 TABLET, FILM COATED ORAL NIGHTLY
Status: DISCONTINUED | OUTPATIENT
Start: 2023-03-25 | End: 2023-03-27

## 2023-03-25 RX ORDER — ONDANSETRON 4 MG/1
4 TABLET, ORALLY DISINTEGRATING ORAL EVERY 8 HOURS PRN
Status: DISCONTINUED | OUTPATIENT
Start: 2023-03-25 | End: 2023-03-27

## 2023-03-25 RX ORDER — PREDNISONE 20 MG/1
40 TABLET ORAL DAILY
Status: DISCONTINUED | OUTPATIENT
Start: 2023-03-27 | End: 2023-03-27

## 2023-03-25 RX ORDER — MAGNESIUM SULFATE 1 G/100ML
1000 INJECTION INTRAVENOUS PRN
Status: DISCONTINUED | OUTPATIENT
Start: 2023-03-25 | End: 2023-03-27

## 2023-03-25 RX ORDER — METHYLPREDNISOLONE SODIUM SUCCINATE 40 MG/ML
40 INJECTION, POWDER, LYOPHILIZED, FOR SOLUTION INTRAMUSCULAR; INTRAVENOUS EVERY 6 HOURS
Status: DISCONTINUED | OUTPATIENT
Start: 2023-03-25 | End: 2023-03-27

## 2023-03-25 RX ORDER — SODIUM CHLORIDE 0.9 % (FLUSH) 0.9 %
5-40 SYRINGE (ML) INJECTION PRN
Status: DISCONTINUED | OUTPATIENT
Start: 2023-03-25 | End: 2023-03-27

## 2023-03-25 RX ADMIN — LORAZEPAM 0.5 MG: 0.5 TABLET ORAL at 17:42

## 2023-03-25 RX ADMIN — IPRATROPIUM BROMIDE AND ALBUTEROL SULFATE 1 AMPULE: .5; 2.5 SOLUTION RESPIRATORY (INHALATION) at 17:51

## 2023-03-25 RX ADMIN — SODIUM CHLORIDE 5 ML/HR: 9 INJECTION, SOLUTION INTRAVENOUS at 21:54

## 2023-03-25 RX ADMIN — SODIUM CHLORIDE, PRESERVATIVE FREE 10 ML: 5 INJECTION INTRAVENOUS at 09:20

## 2023-03-25 RX ADMIN — MAGNESIUM SULFATE HEPTAHYDRATE 2000 MG: 40 INJECTION, SOLUTION INTRAVENOUS at 22:05

## 2023-03-25 RX ADMIN — IPRATROPIUM BROMIDE AND ALBUTEROL SULFATE 1 AMPULE: 2.5; .5 SOLUTION RESPIRATORY (INHALATION) at 20:19

## 2023-03-25 RX ADMIN — SALINE NASAL SPRAY 1 SPRAY: 1.5 SOLUTION NASAL at 17:56

## 2023-03-25 RX ADMIN — SERTRALINE 100 MG: 100 TABLET, FILM COATED ORAL at 09:19

## 2023-03-25 RX ADMIN — METHYLPREDNISOLONE SODIUM SUCCINATE 40 MG: 40 INJECTION, POWDER, FOR SOLUTION INTRAMUSCULAR; INTRAVENOUS at 16:48

## 2023-03-25 RX ADMIN — POTASSIUM CHLORIDE 10 MEQ: 7.46 INJECTION, SOLUTION INTRAVENOUS at 23:20

## 2023-03-25 RX ADMIN — DILTIAZEM HYDROCHLORIDE 240 MG: 240 CAPSULE, COATED, EXTENDED RELEASE ORAL at 09:19

## 2023-03-25 RX ADMIN — Medication 10 ML: at 01:59

## 2023-03-25 RX ADMIN — FAMOTIDINE 20 MG: 20 TABLET ORAL at 01:58

## 2023-03-25 RX ADMIN — IPRATROPIUM BROMIDE AND ALBUTEROL SULFATE 1 AMPULE: 2.5; .5 SOLUTION RESPIRATORY (INHALATION) at 14:50

## 2023-03-25 RX ADMIN — METHYLPREDNISOLONE SODIUM SUCCINATE 40 MG: 40 INJECTION, POWDER, FOR SOLUTION INTRAMUSCULAR; INTRAVENOUS at 09:20

## 2023-03-25 RX ADMIN — ENOXAPARIN SODIUM 30 MG: 100 INJECTION SUBCUTANEOUS at 09:19

## 2023-03-25 RX ADMIN — SODIUM PHOSPHATE, MONOBASIC, MONOHYDRATE AND SODIUM PHOSPHATE, DIBASIC, ANHYDROUS 7.77 MMOL: 276; 142 INJECTION, SOLUTION INTRAVENOUS at 23:23

## 2023-03-25 RX ADMIN — METHYLPREDNISOLONE SODIUM SUCCINATE 40 MG: 40 INJECTION, POWDER, FOR SOLUTION INTRAMUSCULAR; INTRAVENOUS at 20:04

## 2023-03-25 RX ADMIN — IPRATROPIUM BROMIDE AND ALBUTEROL SULFATE 1 AMPULE: .5; 2.5 SOLUTION RESPIRATORY (INHALATION) at 07:26

## 2023-03-25 RX ADMIN — FAMOTIDINE 20 MG: 20 TABLET ORAL at 09:19

## 2023-03-25 RX ADMIN — SODIUM CHLORIDE, PRESERVATIVE FREE 10 ML: 5 INJECTION INTRAVENOUS at 20:29

## 2023-03-25 RX ADMIN — NADOLOL 20 MG: 40 TABLET ORAL at 09:19

## 2023-03-25 RX ADMIN — BENZONATATE 100 MG: 100 CAPSULE ORAL at 09:19

## 2023-03-25 RX ADMIN — FAMOTIDINE 20 MG: 20 TABLET ORAL at 20:04

## 2023-03-25 RX ADMIN — BENZONATATE 100 MG: 100 CAPSULE ORAL at 17:42

## 2023-03-25 RX ADMIN — ATORVASTATIN CALCIUM 40 MG: 40 TABLET, FILM COATED ORAL at 09:19

## 2023-03-25 RX ADMIN — LEVOFLOXACIN 750 MG: 5 INJECTION, SOLUTION INTRAVENOUS at 21:54

## 2023-03-25 RX ADMIN — MIRTAZAPINE 30 MG: 30 TABLET, FILM COATED ORAL at 01:58

## 2023-03-25 RX ADMIN — POTASSIUM CHLORIDE 10 MEQ: 7.46 INJECTION, SOLUTION INTRAVENOUS at 22:22

## 2023-03-25 RX ADMIN — MIRTAZAPINE 30 MG: 30 TABLET, FILM COATED ORAL at 20:04

## 2023-03-25 RX ADMIN — METHYLPREDNISOLONE SODIUM SUCCINATE 40 MG: 40 INJECTION, POWDER, FOR SOLUTION INTRAMUSCULAR; INTRAVENOUS at 01:58

## 2023-03-25 RX ADMIN — SODIUM CHLORIDE 5 ML/HR: 9 INJECTION, SOLUTION INTRAVENOUS at 22:04

## 2023-03-25 RX ADMIN — LEVOTHYROXINE SODIUM 125 MCG: 125 TABLET ORAL at 06:59

## 2023-03-25 ASSESSMENT — PAIN SCALES - GENERAL: PAINLEVEL_OUTOF10: 0

## 2023-03-25 NOTE — ACP (ADVANCE CARE PLANNING)
Advance Care Planning     General Advance Care Planning (ACP) Conversation    Date of Conversation: 3/24/2023  Conducted with: Patient with Decision Making Capacity    Healthcare Decision Maker:    Primary Decision Maker: Lizette Richey - Spouse - [phone]    Secondary Decision Maker: Cameron Fan - Child - [phone]  Click here to complete Healthcare Decision Makers including selection of the Healthcare Decision Maker Relationship (ie \"Primary\"). Today we documented Decision Maker(s) consistent with ACP documents on file.  Verified POA documents were still correct with pt at bedside    Content/Action Overview:    Reviewed DNR/DNI and patient elects Full Code (Attempt Resuscitation)    Length of Voluntary ACP Conversation in minutes:  <16 minutes (Non-Billable)    [address]  Case Management Department  Phone: [phone] Fax: [phone]

## 2023-03-25 NOTE — H&P
Hospital Medicine History & Physical      PCP: Harry Rae MD    Date of Admission: 3/24/2023    Date of Service: Pt seen/examined on 03/25/23 and Admitted to Inpatient with expected LOS greater than two midnights due to medical therapy. Chief Complaint:  Shortness of breath, fatigue    History Of Present Illness: Colten Gavin is a 77 y.o. female with past medical history as below, including AFIB, COPD, ILD, anxiety, chronic respiratory failure on 6 L with rest, up to 8 L with movement, who presents with worsening dyspnea, progressive, finally decided to come to the hospital yesterday. No cough right now, has been doing her regular breathing treatments. No fevers. Gets cold sweats sometimes. No aggravating or alleviating factors. Pt had similar presentation earlier this month. No LE edema. She is unsure of potential environmental exposures says she has had mold in her house previously. Past Medical History:          Diagnosis Date    A-fib ([address]     Anxiety     COPD (chronic obstructive pulmonary disease) ([facility]     Cryptogenic organizing pneumonia ([facility]     Depression     GERD (gastroesophageal reflux disease)     Hyperlipidemia     On home oxygen therapy     uses O2 3L prn    Rash     Thyroid disease     hypothyroidism       Past Surgical History:          Procedure Laterality Date    ARM SURGERY Left 3/2/2020    LEFT ULNAR NERVE DECOMPRESSION AT THE ELBOW performed by Norman Abbott MD at [facility] N/A 6/27/2019    EBUS WF W/ANES.  performed by Sujata Charles MD at [place] [address]  6/27/2019    BRONCHOSCOPY/TRANSBRONCHIAL NEEDLE BIOPSY performed by Sujata Charles MD at [place] [address]  6/27/2019    BRONCHOSCOPY/TRANSBRONCHIAL LUNG BIOPSY performed by Sujata Charles MD at [place] [address]  6/27/2019    BRONCHOSCOPY ALVEOLAR LAVAGE performed by Sujata Charles MD at [place] [address]  07/10/2019

## 2023-03-25 NOTE — CARE COORDINATION
Case Management Assessment  Initial Evaluation    Date/Time of Evaluation: 3/25/2023 12:15 PM  Assessment Completed by MYRON Gomes  Case Management Department  Phone: [phone] Fax: [phone]        If patient is discharged prior to next notation, then this note serves as note for discharge by case management. Patient Name: Slime Obando                   YOB: 1955  Diagnosis: Elevated lactic acid level [R79.89]  Acute on chronic respiratory failure (HCC) [J96.20]  Acute on chronic respiratory failure with hypoxia (HCC) [J96.21]  Pneumonia of both lungs due to infectious organism, unspecified part of lung [J18.9]                   Date / Time: 3/24/2023  7:29 PM    Patient Admission Status: Inpatient   Readmission Risk (Low < 19, Mod (19-27), High > 27): Readmission Risk Score: 37.2    Current PCP: Maikol Davila MD  PCP verified by CM? Yes    Chart Reviewed: Yes      History Provided by: Patient  Patient Orientation: Alert and Oriented    Patient Cognition: Alert    Hospitalization in the last 30 days (Readmission):  Yes    If yes, Readmission Assessment in CM Navigator will be completed. Advance Directives:      Code Status: Full Code   Patient's Primary Decision Maker is: Named in Scanned ACP Document    Primary Decision Maker: Geoffrey Perez - Spouse - [phone]    Secondary Decision Maker: Michael Neves - Child - [phone]    Discharge Planning:    Patient lives with: Spouse/Significant Other Type of Home: House  Primary Care Giver: Self  Patient Support Systems include: Spouse/Significant Other, Family Members   Current Financial resources: Medicaid, Other (Comment), Medicare ([place])  Current community resources: Other (Comment) ([place] [facility] through [facility])  Current services prior to admission: [facility] ([employer] for skilled home care.  pt stated her daughter is her home health

## 2023-03-25 NOTE — ED NOTES
Pt resting, O2 saturation between 77% and 87%, titrated O2 to 8LPM with high flow with humidifier and advised pt to breath in through her nose as much as possible. Pt repositioned for comfort.       Jose Coyne RN  03/24/23 3003

## 2023-03-25 NOTE — ED NOTES
Pt to ER via [facility] with reported worsening SOB x approx 3-4 days. Pt denies N/V, fever, CP or further c/o's.  Cardiac monitor and O2 @ 6L / [address] Ria Jacob RN  03/24/23 2007

## 2023-03-25 NOTE — CONSULTS
fever  HENT: Negative for sore throat  Eyes: Negative for redness   Respiratory: + for dyspnea, cough  Cardiovascular: Negative for chest pain  Gastrointestinal: Negative for vomiting, diarrhea   Genitourinary: Negative for hematuria   Musculoskeletal: Negative for arthralgias   Skin: Negative for rash  Neurological: Negative for syncope  Hematological: Negative for adenopathy  Psychiatric/Behavorial: Negative for anxiety      Objective:   PHYSICAL EXAM:    Blood pressure (!) 108/56, pulse 68, temperature 97.3 °F (36.3 °C), temperature source Axillary, resp. rate 20, height 5' 3\" (1.6 m), weight 107 lb (48.5 kg), SpO2 98 %, not currently breastfeeding.' on RA  Gen: No distress. Eyes: PERRL. No sclera icterus. No conjunctival injection. ENT: No discharge. Pharynx clear. Neck: Trachea midline. No obvious mass. Resp: Rhonchi bilaterally no clear Rales   CV: Regular rate. Regular rhythm. No murmur or rub. No edema. GI: Non-tender. Non-distended. No hernia. Skin: Warm and dry. No nodule on exposed extremities. Lymph: No cervical LAD. No supraclavicular LAD. M/S: No cyanosis. No joint deformity. No clubbing. Neuro: Awake. Alert. Moves all four extremities. Psych: Oriented x 3. No anxiety.            DATA reviewed by me:   CXR  CT               LABS/IMAGING:    CBC:  Lab Results   Component Value Date    WBC 7.2 03/24/2023    HGB 9.1 (L) 03/24/2023    HCT 28.9 (L) 03/24/2023    MCV 79.0 (L) 03/24/2023     03/24/2023    LYMPHOPCT 6.6 03/24/2023    RBC 3.65 (L) 03/24/2023    MCH 24.8 (L) 03/24/2023    MCHC 31.4 03/24/2023    RDW 15.6 (H) 03/24/2023    NEUTOPHILPCT 90.3 03/24/2023    MONOPCT 2.5 03/24/2023    BASOPCT 0.4 03/24/2023    NEUTROABS 6.5 03/24/2023    LYMPHSABS 0.5 (L) 03/24/2023    MONOSABS 0.2 03/24/2023    EOSABS 0.0 03/24/2023    BASOSABS 0.0 03/24/2023       Recent Labs     03/24/23  2020 03/03/23  0502 03/02/23  1755   WBC 7.2 5.9 8.1   HGB 9.1* 10.0* 9.9*   HCT 28.9* 31.1* 31.3*

## 2023-03-26 LAB
ANION GAP SERPL CALCULATED.3IONS-SCNC: 5 MMOL/L (ref 3–16)
BASOPHILS # BLD: 0.1 K/UL (ref 0–0.2)
BASOPHILS NFR BLD: 0.8 %
BUN SERPL-MCNC: 12 MG/DL (ref 7–20)
CALCIUM SERPL-MCNC: 8.7 MG/DL (ref 8.3–10.6)
CHLORIDE SERPL-SCNC: 103 MMOL/L (ref 99–110)
CO2 SERPL-SCNC: 34 MMOL/L (ref 21–32)
CREAT SERPL-MCNC: <0.5 MG/DL (ref 0.6–1.2)
DEPRECATED RDW RBC AUTO: 15.6 % (ref 12.4–15.4)
EOSINOPHIL # BLD: 0 K/UL (ref 0–0.6)
EOSINOPHIL NFR BLD: 0.4 %
GFR SERPLBLD CREATININE-BSD FMLA CKD-EPI: >60 ML/MIN/{1.73_M2}
GLUCOSE BLD-MCNC: 111 MG/DL (ref 70–99)
GLUCOSE BLD-MCNC: 130 MG/DL (ref 70–99)
GLUCOSE BLD-MCNC: 144 MG/DL (ref 70–99)
GLUCOSE BLD-MCNC: 198 MG/DL (ref 70–99)
GLUCOSE BLD-MCNC: 206 MG/DL (ref 70–99)
GLUCOSE SERPL-MCNC: 105 MG/DL (ref 70–99)
HCT VFR BLD AUTO: 28.2 % (ref 36–48)
HEMOCCULT STL QL: NORMAL
HGB BLD-MCNC: 8.6 G/DL (ref 12–16)
LYMPHOCYTES # BLD: 0.3 K/UL (ref 1–5.1)
LYMPHOCYTES NFR BLD: 2.9 %
MCH RBC QN AUTO: 24.6 PG (ref 26–34)
MCHC RBC AUTO-ENTMCNC: 30.4 G/DL (ref 31–36)
MCV RBC AUTO: 81.1 FL (ref 80–100)
MONOCYTES # BLD: 0.2 K/UL (ref 0–1.3)
MONOCYTES NFR BLD: 2.3 %
NEUTROPHILS # BLD: 8.9 K/UL (ref 1.7–7.7)
NEUTROPHILS NFR BLD: 93.6 %
PERFORMED ON: ABNORMAL
PLATELET # BLD AUTO: 148 K/UL (ref 135–450)
PMV BLD AUTO: 7.9 FL (ref 5–10.5)
POTASSIUM SERPL-SCNC: 3.8 MMOL/L (ref 3.5–5.1)
RBC # BLD AUTO: 3.48 M/UL (ref 4–5.2)
REPORT: NORMAL
RESP PATH DNA+RNA PNL NPH NAA+NON-PROBE: NORMAL
SODIUM SERPL-SCNC: 142 MMOL/L (ref 136–145)
WBC # BLD AUTO: 9.5 K/UL (ref 4–11)

## 2023-03-26 PROCEDURE — 6370000000 HC RX 637 (ALT 250 FOR IP): Performed by: INTERNAL MEDICINE

## 2023-03-26 PROCEDURE — 99233 SBSQ HOSP IP/OBS HIGH 50: CPT | Performed by: INTERNAL MEDICINE

## 2023-03-26 PROCEDURE — 82270 OCCULT BLOOD FECES: CPT

## 2023-03-26 PROCEDURE — 94640 AIRWAY INHALATION TREATMENT: CPT

## 2023-03-26 PROCEDURE — 6370000000 HC RX 637 (ALT 250 FOR IP)

## 2023-03-26 PROCEDURE — 2000000000 HC ICU R&B

## 2023-03-26 PROCEDURE — 6360000002 HC RX W HCPCS: Performed by: INTERNAL MEDICINE

## 2023-03-26 PROCEDURE — 36415 COLL VENOUS BLD VENIPUNCTURE: CPT

## 2023-03-26 PROCEDURE — 2580000003 HC RX 258: Performed by: INTERNAL MEDICINE

## 2023-03-26 PROCEDURE — 85025 COMPLETE CBC W/AUTO DIFF WBC: CPT

## 2023-03-26 PROCEDURE — 80048 BASIC METABOLIC PNL TOTAL CA: CPT

## 2023-03-26 RX ORDER — INSULIN LISPRO 100 [IU]/ML
0-4 INJECTION, SOLUTION INTRAVENOUS; SUBCUTANEOUS
Status: DISCONTINUED | OUTPATIENT
Start: 2023-03-27 | End: 2023-03-27

## 2023-03-26 RX ORDER — INSULIN LISPRO 100 [IU]/ML
0-4 INJECTION, SOLUTION INTRAVENOUS; SUBCUTANEOUS NIGHTLY
Status: DISCONTINUED | OUTPATIENT
Start: 2023-03-27 | End: 2023-03-27

## 2023-03-26 RX ADMIN — IPRATROPIUM BROMIDE AND ALBUTEROL SULFATE 1 AMPULE: 2.5; .5 SOLUTION RESPIRATORY (INHALATION) at 20:21

## 2023-03-26 RX ADMIN — BENZONATATE 100 MG: 100 CAPSULE ORAL at 16:42

## 2023-03-26 RX ADMIN — SERTRALINE 100 MG: 100 TABLET, FILM COATED ORAL at 12:27

## 2023-03-26 RX ADMIN — MIRTAZAPINE 30 MG: 30 TABLET, FILM COATED ORAL at 20:27

## 2023-03-26 RX ADMIN — NADOLOL 20 MG: 40 TABLET ORAL at 12:29

## 2023-03-26 RX ADMIN — METHYLPREDNISOLONE SODIUM SUCCINATE 40 MG: 40 INJECTION, POWDER, FOR SOLUTION INTRAMUSCULAR; INTRAVENOUS at 01:38

## 2023-03-26 RX ADMIN — FAMOTIDINE 20 MG: 20 TABLET ORAL at 12:27

## 2023-03-26 RX ADMIN — LEVOFLOXACIN 750 MG: 5 INJECTION, SOLUTION INTRAVENOUS at 20:30

## 2023-03-26 RX ADMIN — DILTIAZEM HYDROCHLORIDE 240 MG: 240 CAPSULE, COATED, EXTENDED RELEASE ORAL at 12:39

## 2023-03-26 RX ADMIN — SODIUM CHLORIDE, PRESERVATIVE FREE 10 ML: 5 INJECTION INTRAVENOUS at 20:27

## 2023-03-26 RX ADMIN — POTASSIUM CHLORIDE 10 MEQ: 7.46 INJECTION, SOLUTION INTRAVENOUS at 01:31

## 2023-03-26 RX ADMIN — METHYLPREDNISOLONE SODIUM SUCCINATE 40 MG: 40 INJECTION, POWDER, FOR SOLUTION INTRAMUSCULAR; INTRAVENOUS at 12:27

## 2023-03-26 RX ADMIN — LEVOTHYROXINE SODIUM 125 MCG: 125 TABLET ORAL at 04:14

## 2023-03-26 RX ADMIN — LORAZEPAM 0.5 MG: 0.5 TABLET ORAL at 20:37

## 2023-03-26 RX ADMIN — ONDANSETRON 4 MG: 4 TABLET, ORALLY DISINTEGRATING ORAL at 16:42

## 2023-03-26 RX ADMIN — POTASSIUM CHLORIDE 10 MEQ: 7.46 INJECTION, SOLUTION INTRAVENOUS at 00:33

## 2023-03-26 RX ADMIN — ATORVASTATIN CALCIUM 40 MG: 40 TABLET, FILM COATED ORAL at 12:27

## 2023-03-26 RX ADMIN — ENOXAPARIN SODIUM 30 MG: 100 INJECTION SUBCUTANEOUS at 12:27

## 2023-03-26 RX ADMIN — FAMOTIDINE 20 MG: 20 TABLET ORAL at 20:27

## 2023-03-26 RX ADMIN — IPRATROPIUM BROMIDE AND ALBUTEROL SULFATE 1 AMPULE: 2.5; .5 SOLUTION RESPIRATORY (INHALATION) at 08:46

## 2023-03-26 RX ADMIN — IPRATROPIUM BROMIDE AND ALBUTEROL SULFATE 1 AMPULE: 2.5; .5 SOLUTION RESPIRATORY (INHALATION) at 12:01

## 2023-03-26 RX ADMIN — INSULIN LISPRO 1 UNITS: 100 INJECTION, SOLUTION INTRAVENOUS; SUBCUTANEOUS at 00:34

## 2023-03-26 RX ADMIN — METHYLPREDNISOLONE SODIUM SUCCINATE 40 MG: 40 INJECTION, POWDER, FOR SOLUTION INTRAMUSCULAR; INTRAVENOUS at 20:27

## 2023-03-26 NOTE — SIGNIFICANT EVENT
extra-renal metabolism of   creatinine, excessive creatinine ingestion, or following   therapy that affects renal tubular secretion. Calcium 9.4 mg/dL    Total Protein 5.4 Low  g/dL    Albumin 3.6 g/dL    Albumin/Globulin Ratio 2.0    Total Bilirubin <0.2 mg/dL    Alkaline Phosphatase 75 U/L    ALT 23 U/L    AST 21 U/L   Magnesium [[id number]]    Collected: 03/25/23 2113    Updated: 03/25/23 2137    Specimen Source: Blood     Magnesium 1.80 mg/dL   Calcium, Ionized [[id number]]    Collected: 03/25/23 2113    Updated: 03/25/23 2125    Specimen Source: Blood     Calcium, Ionized 1.14 mmol/L    Comment: Decreased pH due to localized lactic acid production may cause   increased ionized calcium. Increased pH due to loss of CO2   from the sample may cause decreased ionized calcium. pH, [place] 7.413   CBC with Auto Differential [[id number]] (Abnormal)    Collected: 03/25/23 2113    Updated: 03/25/23 2125    Specimen Source: Blood     WBC 11.2 High  K/uL    RBC 3.27 Low  M/uL    Hemoglobin 8.1 Low  g/dL    Hematocrit 26.7 Low  %    MCV 81.6 fL    MCH 24.7 Low  pg    MCHC 30.3 Low  g/dL    RDW 15.9 High  %    Platelets 412 K/uL    MPV 7.4 fL    Neutrophils % 94.8 %    Lymphocytes % 2.3 %    Monocytes % 2.8 %    Eosinophils % 0.0 %    Basophils % 0.1 %    Neutrophils Absolute 10.6 High  K/uL    Lymphocytes Absolute 0.3 Low  K/uL    Monocytes Absolute 0.3 K/uL    Eosinophils Absolute 0.0 K/uL    Basophils Absolute 0.0 K/uL       Total critical care time caring for this patient with life threatening, unstable organ failure, including direct patient contact, management of life support systems, review of data including imaging and labs, discussions with other team members and physicians is 35 minutes so far today, excluding procedures.

## 2023-03-27 ENCOUNTER — TELEPHONE (OUTPATIENT)
Dept: CARDIOLOGY CLINIC | Age: 68
End: 2023-03-27

## 2023-03-27 LAB
ANION GAP SERPL CALCULATED.3IONS-SCNC: 5 MMOL/L (ref 3–16)
BUN SERPL-MCNC: 16 MG/DL (ref 7–20)
CALCIUM SERPL-MCNC: 8.5 MG/DL (ref 8.3–10.6)
CHLORIDE SERPL-SCNC: 101 MMOL/L (ref 99–110)
CO2 SERPL-SCNC: 35 MMOL/L (ref 21–32)
CREAT SERPL-MCNC: <0.5 MG/DL (ref 0.6–1.2)
DEPRECATED RDW RBC AUTO: 16.2 % (ref 12.4–15.4)
EST. AVERAGE GLUCOSE BLD GHB EST-MCNC: 119.8 MG/DL
GFR SERPLBLD CREATININE-BSD FMLA CKD-EPI: >60 ML/MIN/{1.73_M2}
GLUCOSE BLD-MCNC: 168 MG/DL (ref 70–99)
GLUCOSE BLD-MCNC: 170 MG/DL (ref 70–99)
GLUCOSE BLD-MCNC: 199 MG/DL (ref 70–99)
GLUCOSE BLD-MCNC: 215 MG/DL (ref 70–99)
GLUCOSE SERPL-MCNC: 170 MG/DL (ref 70–99)
HBA1C MFR BLD: 5.8 %
HCT VFR BLD AUTO: 29.3 % (ref 36–48)
HGB BLD-MCNC: 9.2 G/DL (ref 12–16)
MCH RBC QN AUTO: 25 PG (ref 26–34)
MCHC RBC AUTO-ENTMCNC: 31.6 G/DL (ref 31–36)
MCV RBC AUTO: 79.1 FL (ref 80–100)
PERFORMED ON: ABNORMAL
PLATELET # BLD AUTO: 150 K/UL (ref 135–450)
PMV BLD AUTO: 7 FL (ref 5–10.5)
POTASSIUM SERPL-SCNC: 3.9 MMOL/L (ref 3.5–5.1)
RBC # BLD AUTO: 3.7 M/UL (ref 4–5.2)
SODIUM SERPL-SCNC: 141 MMOL/L (ref 136–145)
WBC # BLD AUTO: 11.6 K/UL (ref 4–11)

## 2023-03-27 PROCEDURE — 2060000000 HC ICU INTERMEDIATE R&B

## 2023-03-27 PROCEDURE — 2700000000 HC OXYGEN THERAPY PER DAY

## 2023-03-27 PROCEDURE — 94640 AIRWAY INHALATION TREATMENT: CPT

## 2023-03-27 PROCEDURE — 99233 SBSQ HOSP IP/OBS HIGH 50: CPT | Performed by: INTERNAL MEDICINE

## 2023-03-27 PROCEDURE — 36415 COLL VENOUS BLD VENIPUNCTURE: CPT

## 2023-03-27 PROCEDURE — 6370000000 HC RX 637 (ALT 250 FOR IP): Performed by: INTERNAL MEDICINE

## 2023-03-27 PROCEDURE — 85027 COMPLETE CBC AUTOMATED: CPT

## 2023-03-27 PROCEDURE — 2580000003 HC RX 258: Performed by: INTERNAL MEDICINE

## 2023-03-27 PROCEDURE — 6360000002 HC RX W HCPCS: Performed by: INTERNAL MEDICINE

## 2023-03-27 PROCEDURE — 94761 N-INVAS EAR/PLS OXIMETRY MLT: CPT

## 2023-03-27 PROCEDURE — 80048 BASIC METABOLIC PNL TOTAL CA: CPT

## 2023-03-27 RX ORDER — ATORVASTATIN CALCIUM 40 MG/1
40 TABLET, FILM COATED ORAL NIGHTLY
Status: DISCONTINUED | OUTPATIENT
Start: 2023-03-27 | End: 2023-03-27

## 2023-03-27 RX ADMIN — ATORVASTATIN CALCIUM 40 MG: 40 TABLET, FILM COATED ORAL at 09:12

## 2023-03-27 RX ADMIN — ENOXAPARIN SODIUM 30 MG: 100 INJECTION SUBCUTANEOUS at 09:12

## 2023-03-27 RX ADMIN — SERTRALINE 100 MG: 100 TABLET, FILM COATED ORAL at 09:12

## 2023-03-27 RX ADMIN — IPRATROPIUM BROMIDE AND ALBUTEROL SULFATE 1 AMPULE: 2.5; .5 SOLUTION RESPIRATORY (INHALATION) at 13:00

## 2023-03-27 RX ADMIN — FAMOTIDINE 20 MG: 20 TABLET ORAL at 21:25

## 2023-03-27 RX ADMIN — LORAZEPAM 0.5 MG: 0.5 TABLET ORAL at 21:25

## 2023-03-27 RX ADMIN — LEVOFLOXACIN 750 MG: 5 INJECTION, SOLUTION INTRAVENOUS at 21:27

## 2023-03-27 RX ADMIN — SODIUM CHLORIDE, PRESERVATIVE FREE 10 ML: 5 INJECTION INTRAVENOUS at 09:17

## 2023-03-27 RX ADMIN — MIRTAZAPINE 30 MG: 30 TABLET, FILM COATED ORAL at 21:25

## 2023-03-27 RX ADMIN — LEVOTHYROXINE SODIUM 125 MCG: 125 TABLET ORAL at 06:18

## 2023-03-27 RX ADMIN — FAMOTIDINE 20 MG: 20 TABLET ORAL at 09:12

## 2023-03-27 RX ADMIN — PREDNISONE 40 MG: 20 TABLET ORAL at 09:14

## 2023-03-27 RX ADMIN — IPRATROPIUM BROMIDE AND ALBUTEROL SULFATE 1 AMPULE: 2.5; .5 SOLUTION RESPIRATORY (INHALATION) at 19:05

## 2023-03-27 RX ADMIN — DILTIAZEM HYDROCHLORIDE 240 MG: 240 CAPSULE, COATED, EXTENDED RELEASE ORAL at 09:12

## 2023-03-27 RX ADMIN — NADOLOL 20 MG: 40 TABLET ORAL at 09:17

## 2023-03-27 RX ADMIN — MUPIROCIN: 20 OINTMENT TOPICAL at 15:20

## 2023-03-27 RX ADMIN — ATORVASTATIN CALCIUM 40 MG: 40 TABLET, FILM COATED ORAL at 21:25

## 2023-03-27 RX ADMIN — INSULIN LISPRO 1 UNITS: 100 INJECTION, SOLUTION INTRAVENOUS; SUBCUTANEOUS at 16:43

## 2023-03-27 RX ADMIN — IPRATROPIUM BROMIDE AND ALBUTEROL SULFATE 1 AMPULE: 2.5; .5 SOLUTION RESPIRATORY (INHALATION) at 08:05

## 2023-03-27 RX ADMIN — SODIUM CHLORIDE, PRESERVATIVE FREE 10 ML: 5 INJECTION INTRAVENOUS at 21:25

## 2023-03-27 RX ADMIN — MUPIROCIN: 20 OINTMENT TOPICAL at 21:24

## 2023-03-27 ASSESSMENT — PAIN SCALES - GENERAL
PAINLEVEL_OUTOF10: 0
PAINLEVEL_OUTOF10: 0

## 2023-03-27 NOTE — CARE COORDINATION
INTERDISCIPLINARY PLAN OF CARE CONFERENCE    Date/Time: 3/27/2023 2:17 PM  Completed by: Fiona Stephenson RN, Case Management      Patient Name:  Maximo Bonilla  YOB: 1955  Admitting Diagnosis: Elevated lactic acid level [R79.89]  Acute on chronic respiratory failure ([facility] [address] [J96.20]  Acute on chronic respiratory failure with hypoxia ([facility] [address] [J96.21]  Pneumonia of both lungs due to infectious organism, unspecified part of lung [J18.9]     Admit Date/Time:  3/24/2023  7:29 PM    Chart reviewed. Interdisciplinary team contacted or reviewed plan related to patient progress and discharge plans. Disciplines included Case Management, Nursing, and Dietitian. Current Status:Inpatient   PT/OT recommendation for discharge plan of care: PT/OT evals pending    Expected D/C Disposition:  Home  Confirmed plan with patient and/or family Yes confirmed with: Spouse    Discharge Plan Comments: Chart reviewed. Met with spouse at bedside to discuss discharge needs and plan as patient sleeping soundly and did not awaken to name being called. Patient lives in home with spouse who provides assistance as needed with adl's. Spoke to [place] at [facility] who confirmed [facility] services as SN only; States able to provide PT/OT at discharge, if needed. PT/OT consult order placed. Verified home o2 orders with [facility] as 3L continuous. SpO2 99% on 6L cont. Will need new o2 order at discharge if requirements exceed current order. Following.     Fiona Stephenson RN

## 2023-03-28 VITALS
HEIGHT: 63 IN | SYSTOLIC BLOOD PRESSURE: 105 MMHG | DIASTOLIC BLOOD PRESSURE: 68 MMHG | OXYGEN SATURATION: 99 % | HEART RATE: 77 BPM | RESPIRATION RATE: 18 BRPM | WEIGHT: 107.7 LBS | BODY MASS INDEX: 19.08 KG/M2 | TEMPERATURE: 98.2 F

## 2023-03-28 LAB
ANION GAP SERPL CALCULATED.3IONS-SCNC: 5 MMOL/L (ref 3–16)
BASOPHILS # BLD: 0 K/UL (ref 0–0.2)
BASOPHILS NFR BLD: 0.2 %
BUN SERPL-MCNC: 17 MG/DL (ref 7–20)
CALCIUM SERPL-MCNC: 8.3 MG/DL (ref 8.3–10.6)
CHLORIDE SERPL-SCNC: 100 MMOL/L (ref 99–110)
CO2 SERPL-SCNC: 36 MMOL/L (ref 21–32)
CREAT SERPL-MCNC: <0.5 MG/DL (ref 0.6–1.2)
DEPRECATED RDW RBC AUTO: 15.7 % (ref 12.4–15.4)
EOSINOPHIL # BLD: 0 K/UL (ref 0–0.6)
EOSINOPHIL NFR BLD: 0.3 %
GFR SERPLBLD CREATININE-BSD FMLA CKD-EPI: >60 ML/MIN/{1.73_M2}
GLUCOSE BLD-MCNC: 106 MG/DL (ref 70–99)
GLUCOSE BLD-MCNC: 165 MG/DL (ref 70–99)
GLUCOSE BLD-MCNC: 90 MG/DL (ref 70–99)
GLUCOSE SERPL-MCNC: 78 MG/DL (ref 70–99)
HCT VFR BLD AUTO: 28.2 % (ref 36–48)
HGB BLD-MCNC: 8.6 G/DL (ref 12–16)
LYMPHOCYTES # BLD: 2.3 K/UL (ref 1–5.1)
LYMPHOCYTES NFR BLD: 25.9 %
MCH RBC QN AUTO: 24.5 PG (ref 26–34)
MCHC RBC AUTO-ENTMCNC: 30.6 G/DL (ref 31–36)
MCV RBC AUTO: 80 FL (ref 80–100)
MONOCYTES # BLD: 0.7 K/UL (ref 0–1.3)
MONOCYTES NFR BLD: 8.1 %
NEUTROPHILS # BLD: 5.8 K/UL (ref 1.7–7.7)
NEUTROPHILS NFR BLD: 65.5 %
PERFORMED ON: ABNORMAL
PERFORMED ON: ABNORMAL
PERFORMED ON: NORMAL
PLATELET # BLD AUTO: 116 K/UL (ref 135–450)
PMV BLD AUTO: 7.4 FL (ref 5–10.5)
POTASSIUM SERPL-SCNC: 3.9 MMOL/L (ref 3.5–5.1)
RBC # BLD AUTO: 3.52 M/UL (ref 4–5.2)
SODIUM SERPL-SCNC: 141 MMOL/L (ref 136–145)
WBC # BLD AUTO: 8.8 K/UL (ref 4–11)

## 2023-03-28 PROCEDURE — 6370000000 HC RX 637 (ALT 250 FOR IP): Performed by: INTERNAL MEDICINE

## 2023-03-28 PROCEDURE — 97535 SELF CARE MNGMENT TRAINING: CPT

## 2023-03-28 PROCEDURE — 80048 BASIC METABOLIC PNL TOTAL CA: CPT

## 2023-03-28 PROCEDURE — 2580000003 HC RX 258: Performed by: INTERNAL MEDICINE

## 2023-03-28 PROCEDURE — 97530 THERAPEUTIC ACTIVITIES: CPT

## 2023-03-28 PROCEDURE — 85025 COMPLETE CBC W/AUTO DIFF WBC: CPT

## 2023-03-28 PROCEDURE — 36415 COLL VENOUS BLD VENIPUNCTURE: CPT

## 2023-03-28 PROCEDURE — 97166 OT EVAL MOD COMPLEX 45 MIN: CPT

## 2023-03-28 PROCEDURE — 99233 SBSQ HOSP IP/OBS HIGH 50: CPT | Performed by: INTERNAL MEDICINE

## 2023-03-28 PROCEDURE — 99239 HOSP IP/OBS DSCHRG MGMT >30: CPT | Performed by: INTERNAL MEDICINE

## 2023-03-28 PROCEDURE — 6360000002 HC RX W HCPCS: Performed by: INTERNAL MEDICINE

## 2023-03-28 PROCEDURE — 97162 PT EVAL MOD COMPLEX 30 MIN: CPT

## 2023-03-28 RX ORDER — PREDNISONE 10 MG/1
TABLET ORAL
Qty: 30 TABLET | Refills: 0 | Status: SHIPPED | OUTPATIENT
Start: 2023-03-28

## 2023-03-28 RX ORDER — FAMOTIDINE 20 MG/1
20 TABLET, FILM COATED ORAL 2 TIMES DAILY
Status: DISCONTINUED | OUTPATIENT
Start: 2023-03-28 | End: 2023-03-28 | Stop reason: HOSPADM

## 2023-03-28 RX ORDER — DILTIAZEM HYDROCHLORIDE 240 MG/1
240 CAPSULE, COATED, EXTENDED RELEASE ORAL DAILY
Status: DISCONTINUED | OUTPATIENT
Start: 2023-03-28 | End: 2023-03-28 | Stop reason: HOSPADM

## 2023-03-28 RX ORDER — NADOLOL 40 MG/1
20 TABLET ORAL DAILY
Status: DISCONTINUED | OUTPATIENT
Start: 2023-03-28 | End: 2023-03-28 | Stop reason: HOSPADM

## 2023-03-28 RX ORDER — IPRATROPIUM BROMIDE AND ALBUTEROL SULFATE 2.5; .5 MG/3ML; MG/3ML
1 SOLUTION RESPIRATORY (INHALATION) 2 TIMES DAILY
Status: DISCONTINUED | OUTPATIENT
Start: 2023-03-28 | End: 2023-03-28 | Stop reason: HOSPADM

## 2023-03-28 RX ORDER — ONDANSETRON 4 MG/1
4 TABLET, ORALLY DISINTEGRATING ORAL EVERY 8 HOURS PRN
Status: DISCONTINUED | OUTPATIENT
Start: 2023-03-28 | End: 2023-03-28 | Stop reason: HOSPADM

## 2023-03-28 RX ORDER — ACETAMINOPHEN 325 MG/1
650 TABLET ORAL EVERY 6 HOURS PRN
Status: DISCONTINUED | OUTPATIENT
Start: 2023-03-28 | End: 2023-03-28 | Stop reason: HOSPADM

## 2023-03-28 RX ORDER — SERTRALINE HYDROCHLORIDE 100 MG/1
100 TABLET, FILM COATED ORAL DAILY
Status: DISCONTINUED | OUTPATIENT
Start: 2023-03-28 | End: 2023-03-28 | Stop reason: HOSPADM

## 2023-03-28 RX ORDER — SODIUM CHLORIDE 0.9 % (FLUSH) 0.9 %
5-40 SYRINGE (ML) INJECTION EVERY 12 HOURS SCHEDULED
Status: DISCONTINUED | OUTPATIENT
Start: 2023-03-28 | End: 2023-03-28 | Stop reason: HOSPADM

## 2023-03-28 RX ORDER — ONDANSETRON 2 MG/ML
4 INJECTION INTRAMUSCULAR; INTRAVENOUS EVERY 6 HOURS PRN
Status: DISCONTINUED | OUTPATIENT
Start: 2023-03-28 | End: 2023-03-28 | Stop reason: HOSPADM

## 2023-03-28 RX ORDER — BENZONATATE 100 MG/1
100 CAPSULE ORAL EVERY 4 HOURS PRN
Status: DISCONTINUED | OUTPATIENT
Start: 2023-03-28 | End: 2023-03-28 | Stop reason: HOSPADM

## 2023-03-28 RX ORDER — INSULIN LISPRO 100 [IU]/ML
0-4 INJECTION, SOLUTION INTRAVENOUS; SUBCUTANEOUS NIGHTLY
Status: DISCONTINUED | OUTPATIENT
Start: 2023-03-28 | End: 2023-03-28 | Stop reason: HOSPADM

## 2023-03-28 RX ORDER — PREDNISONE 20 MG/1
40 TABLET ORAL DAILY
Status: DISCONTINUED | OUTPATIENT
Start: 2023-03-28 | End: 2023-03-28 | Stop reason: HOSPADM

## 2023-03-28 RX ORDER — POLYETHYLENE GLYCOL 3350 17 G/17G
17 POWDER, FOR SOLUTION ORAL DAILY PRN
Status: DISCONTINUED | OUTPATIENT
Start: 2023-03-28 | End: 2023-03-28 | Stop reason: HOSPADM

## 2023-03-28 RX ORDER — LEVOTHYROXINE SODIUM 0.12 MG/1
125 TABLET ORAL DAILY
Status: DISCONTINUED | OUTPATIENT
Start: 2023-03-28 | End: 2023-03-28 | Stop reason: HOSPADM

## 2023-03-28 RX ORDER — LORAZEPAM 0.5 MG/1
0.5 TABLET ORAL EVERY 6 HOURS PRN
Status: DISCONTINUED | OUTPATIENT
Start: 2023-03-28 | End: 2023-03-28 | Stop reason: HOSPADM

## 2023-03-28 RX ORDER — LEVOFLOXACIN 750 MG/1
750 TABLET ORAL DAILY
Qty: 3 TABLET | Refills: 0 | Status: SHIPPED | OUTPATIENT
Start: 2023-03-29 | End: 2023-04-01

## 2023-03-28 RX ORDER — LORAZEPAM 2 MG/ML
1 INJECTION INTRAMUSCULAR
Status: DISCONTINUED | OUTPATIENT
Start: 2023-03-28 | End: 2023-03-28 | Stop reason: HOSPADM

## 2023-03-28 RX ORDER — SODIUM CHLORIDE 9 MG/ML
INJECTION, SOLUTION INTRAVENOUS PRN
Status: DISCONTINUED | OUTPATIENT
Start: 2023-03-28 | End: 2023-03-28 | Stop reason: HOSPADM

## 2023-03-28 RX ORDER — IPRATROPIUM BROMIDE AND ALBUTEROL SULFATE 2.5; .5 MG/3ML; MG/3ML
1 SOLUTION RESPIRATORY (INHALATION) EVERY 4 HOURS PRN
Status: DISCONTINUED | OUTPATIENT
Start: 2023-03-28 | End: 2023-03-28 | Stop reason: HOSPADM

## 2023-03-28 RX ORDER — MIRTAZAPINE 15 MG/1
30 TABLET, FILM COATED ORAL NIGHTLY
Status: DISCONTINUED | OUTPATIENT
Start: 2023-03-28 | End: 2023-03-28 | Stop reason: HOSPADM

## 2023-03-28 RX ORDER — LEVOFLOXACIN 750 MG/1
750 TABLET ORAL DAILY
Status: DISCONTINUED | OUTPATIENT
Start: 2023-03-28 | End: 2023-03-28 | Stop reason: HOSPADM

## 2023-03-28 RX ORDER — ATORVASTATIN CALCIUM 40 MG/1
40 TABLET, FILM COATED ORAL DAILY
Status: DISCONTINUED | OUTPATIENT
Start: 2023-03-28 | End: 2023-03-28 | Stop reason: HOSPADM

## 2023-03-28 RX ORDER — IPRATROPIUM BROMIDE AND ALBUTEROL SULFATE 2.5; .5 MG/3ML; MG/3ML
1 SOLUTION RESPIRATORY (INHALATION) EVERY 4 HOURS PRN
Status: DISCONTINUED | OUTPATIENT
Start: 2023-03-28 | End: 2023-03-28

## 2023-03-28 RX ORDER — ENOXAPARIN SODIUM 100 MG/ML
30 INJECTION SUBCUTANEOUS DAILY
Status: DISCONTINUED | OUTPATIENT
Start: 2023-03-28 | End: 2023-03-28 | Stop reason: HOSPADM

## 2023-03-28 RX ORDER — INSULIN LISPRO 100 [IU]/ML
0-4 INJECTION, SOLUTION INTRAVENOUS; SUBCUTANEOUS
Status: DISCONTINUED | OUTPATIENT
Start: 2023-03-28 | End: 2023-03-28 | Stop reason: HOSPADM

## 2023-03-28 RX ADMIN — FAMOTIDINE 20 MG: 20 TABLET, FILM COATED ORAL at 09:46

## 2023-03-28 RX ADMIN — Medication 10 ML: at 09:47

## 2023-03-28 RX ADMIN — Medication: at 09:47

## 2023-03-28 RX ADMIN — PREDNISONE 40 MG: 20 TABLET ORAL at 09:46

## 2023-03-28 RX ADMIN — ATORVASTATIN CALCIUM 40 MG: 40 TABLET, FILM COATED ORAL at 09:46

## 2023-03-28 RX ADMIN — NADOLOL 20 MG: 40 TABLET ORAL at 09:46

## 2023-03-28 RX ADMIN — LEVOTHYROXINE SODIUM 125 MCG: 125 TABLET ORAL at 09:46

## 2023-03-28 RX ADMIN — ENOXAPARIN SODIUM 30 MG: 100 INJECTION SUBCUTANEOUS at 09:47

## 2023-03-28 RX ADMIN — DILTIAZEM HYDROCHLORIDE 240 MG: 240 CAPSULE, COATED, EXTENDED RELEASE ORAL at 09:46

## 2023-03-28 RX ADMIN — LEVOFLOXACIN 750 MG: 750 TABLET, FILM COATED ORAL at 09:46

## 2023-03-28 RX ADMIN — SERTRALINE 100 MG: 100 TABLET, FILM COATED ORAL at 09:46

## 2023-03-28 NOTE — DISCHARGE SUMMARY
Name:  Polo Cochran  Room:  3014/3014-01  MRN:    [id number]    Discharge Summary      This discharge summary is in conjunction with a complete physical exam done on the day of discharge. Discharging Physician: Nelda Ball MD      Admit: 3/24/2023  Discharge:  3/28/2023     Diagnoses this Admission    Principal Problem:    Acute on chronic respiratory failure (HCC)  Active Problems:    Elevated lactic acid level    Pneumonia of both lungs due to infectious organism  Resolved Problems:    * No resolved hospital problems. *      Procedures (Please Review Full Report for Details)  none    Consults    IP CONSULT TO HOSPITALIST  IP CONSULT TO PULMONOLOGY  IP CONSULT TO DIETITIAN  IP CONSULT TO PALLIATIVE CARE  IP CONSULT TO DIETITIAN  IP CONSULT TO HOME CARE NEEDS      HPI:    Parul Jimenez is a 77 y.o. female with past medical history as below, including AFIB, COPD, ILD, anxiety, chronic respiratory failure on 6 L with rest, up to 8 L with movement, who presents with worsening dyspnea, progressive, finally decided to come to the hospital yesterday. No cough right now, has been doing her regular breathing treatments. No fevers. Gets cold sweats sometimes. No aggravating or alleviating factors. Pt had similar presentation earlier this month. No LE edema. She is unsure of potential environmental exposures says she has had mold in her house previously. Physical Exam at Discharge:  BP 96/62   Pulse 77   Temp 98.4 °F (36.9 °C) (Oral)   Resp 19   Ht 5' 3\" (1.6 m)   Wt 107 lb 11.2 oz (48.9 kg)   SpO2 99%   BMI 19.08 kg/m²     General: Cooperative, pleasant/Ill appearing, on  Nasal cannula  HEENT: Head: normocephalic,atraumatic, anicteric sclera, clear conjunctiva  Neck: Normal size, Jugular venous pulsations: normal  Respiratory:diminished BS.  Bilateral crackles  Heart: Regular rate and rhythm, S1, S2-normal, No murmurs  Abdomen: soft, nondistended, nontender, normoactive bowel

## 2023-03-28 NOTE — DISCHARGE INSTR - COC
Test Resulted    COVID-19 (Rule Out) 07/04/22 07/04/22 07/04/22 COVID-19, Rapid (Ordered)   07/04/22 Rule-Out Test Resulted    COVID-19 (Rule Out) 05/06/22 05/06/22 05/06/22 COVID-19, Rapid (Ordered)   05/06/22 Rule-Out Test Resulted    COVID-19 (Rule Out) 04/18/22 04/18/22 04/18/22 COVID-19 & Influenza Combo (Ordered)   04/18/22 Rule-Out Test Resulted    COVID-19 (Rule Out) 01/25/22 01/25/22 01/25/22 COVID-19 & Influenza Combo (Ordered)   01/25/22 Rule-Out Test Resulted    COVID-19 (Rule Out) 01/24/22 01/24/22 01/24/22 COVID-19, Rapid (Ordered)   01/24/22 Rule-Out Test Resulted    COVID-19 (Rule Out) 06/18/21 06/18/21 06/18/21 COVID-19 & Influenza Combo (Ordered)   06/18/21 Rule-Out Test Resulted            Nurse Assessment:  Last Vital Signs: BP 96/62   Pulse 77   Temp 98.4 °F (36.9 °C) (Oral)   Resp 19   Ht 5' 3\" (1.6 m)   Wt 107 lb 11.2 oz (48.9 kg)   SpO2 99%   BMI 19.08 kg/m²     Last documented pain score (0-10 scale): Pain Level: 0  Last Weight:   Wt Readings from Last 1 Encounters:   03/28/23 107 lb 11.2 oz (48.9 kg)     Mental Status:  oriented, alert, coherent, logical, thought processes intact, and able to concentrate and follow conversation    IV Access:  - None    Nursing Mobility/ADLs:  Walking   Assisted  Transfer  Independent  Bathing  Assisted  Dressing  Independent  Toileting  Independent  Feeding  Independent  Med Admin  Independent  Med Delivery   whole and prefers mixed with yogurt    Wound Care Documentation and Therapy:        Elimination:  Continence: Bowel: Yes  Bladder: Yes  Urinary Catheter: None   Colostomy/Ileostomy/Ileal Conduit: No       Date of Last BM: 03/28/2023    Intake/Output Summary (Last 24 hours) at 3/28/2023 1449  Last data filed at 3/28/2023 1335  Gross per 24 hour   Intake 1588.73 ml   Output --   Net 1588.73 ml     I/O last 3 completed shifts: In: 2279.6 [P.O.:1980; IV Piggyback:299.6]  Out: 50 [Urine:50]    Safety Concerns:      At Risk for

## 2023-03-28 NOTE — PROGRESS NOTES
03/25/23 2000   RT Protocol   History Pulmonary Disease 2   Respiratory pattern 2   Breath sounds 2   Cough 1   Indications for Bronchodilator Therapy Decreased or absent breath sounds   Bronchodilator Assessment Score 7   RT Inhaler-Nebulizer Bronchodilator Protocol Note    There is a bronchodilator order in the chart from a provider indicating to follow the RT Bronchodilator Protocol and there is an Initiate RT Inhaler-Nebulizer Bronchodilator Protocol order as well (see protocol at bottom of note). CXR Findings:  XR CHEST PORTABLE    Result Date: 3/24/2023  Patchy opacities in both lungs, nonspecific, but may reflect multifocal pneumonia versus pulmonary edema. Increased interstitial opacity. While much or all of this may be chronic, please correlate with any clinical evidence of acute interstitial edema. The findings from the last RT Protocol Assessment were as follows:   History Pulmonary Disease: Chronic pulmonary disease  Respiratory Pattern: Dyspnea on exertion or RR 21-25 bpm  Breath Sounds: Slightly diminished and/or crackles  Cough: Strong, productive  Indication for Bronchodilator Therapy: Decreased or absent breath sounds  Bronchodilator Assessment Score: 7    Aerosolized bronchodilator medication orders have been revised according to the RT Inhaler-Nebulizer Bronchodilator Protocol below. Respiratory Therapist to perform RT Therapy Protocol Assessment initially then follow the protocol. Repeat RT Therapy Protocol Assessment PRN for score 0-3 or on second treatment, BID, and PRN for scores above 3. No Indications - adjust the frequency to every 6 hours PRN wheezing or bronchospasm, if no treatments needed after 48 hours then discontinue using Per Protocol order mode. If indication present, adjust the RT bronchodilator orders based on the Bronchodilator Assessment Score as indicated below.   Use Inhaler orders unless patient has one or more of the following: on home nebulizer,
03/26/23 2000   RT Protocol   History Pulmonary Disease 2   Respiratory pattern 2   Breath sounds 2   Cough 1   Indications for Bronchodilator Therapy Decreased or absent breath sounds   Bronchodilator Assessment Score 7   RT Inhaler-Nebulizer Bronchodilator Protocol Note    There is a bronchodilator order in the chart from a provider indicating to follow the RT Bronchodilator Protocol and there is an Initiate RT Inhaler-Nebulizer Bronchodilator Protocol order as well (see protocol at bottom of note). CXR Findings:  XR CHEST PORTABLE    Result Date: 3/25/2023  Chronic lung changes but no acute lung disease       The findings from the last RT Protocol Assessment were as follows:   History Pulmonary Disease: Chronic pulmonary disease  Respiratory Pattern: Dyspnea on exertion or RR 21-25 bpm  Breath Sounds: Slightly diminished and/or crackles  Cough: Strong, productive  Indication for Bronchodilator Therapy: Decreased or absent breath sounds  Bronchodilator Assessment Score: 7    Aerosolized bronchodilator medication orders have been revised according to the RT Inhaler-Nebulizer Bronchodilator Protocol below. Respiratory Therapist to perform RT Therapy Protocol Assessment initially then follow the protocol. Repeat RT Therapy Protocol Assessment PRN for score 0-3 or on second treatment, BID, and PRN for scores above 3. No Indications - adjust the frequency to every 6 hours PRN wheezing or bronchospasm, if no treatments needed after 48 hours then discontinue using Per Protocol order mode. If indication present, adjust the RT bronchodilator orders based on the Bronchodilator Assessment Score as indicated below.   Use Inhaler orders unless patient has one or more of the following: on home nebulizer, not able to hold breath for 10 seconds, is not alert and oriented, cannot activate and use MDI correctly, or respiratory rate 25 breaths per minute or more, then use the equivalent nebulizer order(s) with same
4 Eyes Skin Assessment     The patient is being assess for   Shift Handoff    I agree that 2 RN's have performed a thorough Head to Toe Skin Assessment on the patient. ALL assessment sites listed below have been assessed. Areas assessed for pressure by both nurses:   [x]   Head, Face, and Ears   [x]   Shoulders, Back, and Chest, Abdomen  [x]   Arms, Elbows, and Hands   [x]   Coccyx, Sacrum, and Ischium  [x]   Legs, Feet, and Heels        Skin Assessed Under all Medical Devices by both nurses:  O2 device tubing and securement devices              All Mepilex Borders were peeled back and area peeked at by both nurses:  Yes  Please list where Mepilex Borders are located:               **SHARE this note so that the co-signing nurse is able to place an eSignature**    Co-signer eSignature: Electronically signed by Wade Rachel RN on 3/27/23 at 1:59 AM EDT    Does the Patient have Skin Breakdown related to pressure?   No     (Insert Photo here)         Amos Prevention initiated:  Yes   Wound Care Orders initiated:  No      C nurse consulted for Pressure Injury (Stage 3,4, Unstageable, DTI, NWPT, Complex wounds)and New or Established Ostomies:  No      Primary Nurse eSignature: Electronically signed by Liat Amaya RN on 3/27/23 at 1:54 AM EDT
4 Eyes Skin Assessment     The patient is being assess for   Transfer to New Unit    I agree that 2 RN's have performed a thorough Head to Toe Skin Assessment on the patient. ALL assessment sites listed below have been assessed. Areas assessed for pressure by both nurses:   [x]   Head, Face, and Ears   [x]   Shoulders, Back, and Chest, Abdomen  [x]   Arms, Elbows, and Hands   [x]   Coccyx, Sacrum, and Ischium  [x]   Legs, Feet, and Heels        Skin Assessed Under all Medical Devices by both nurses:  O2 device tubing and securement devices              All Mepilex Borders were peeled back and area peeked at by both nurses:  Yes  Please list where Mepilex Borders are located:               **SHARE this note so that the co-signing nurse is able to place an eSignature**    Co-signer eSignature: Electronically signed by Mihaela Dye RN on 3/26/23 at 6:31 AM EDT    Does the Patient have Skin Breakdown related to pressure?   No     (Insert Photo here)         Amos Prevention initiated:  Yes   Wound Care Orders initiated:  Yes      [id number]  nurse consulted for Pressure Injury (Stage 3,4, Unstageable, DTI, NWPT, Complex wounds)and New or Established Ostomies:  No      Primary Nurse eSignature: Electronically signed by Kamille Garcia RN on 3/26/23 at 6:27 AM EDT
Bedside with Dr. Elva Acuna -     -O2 decreased to 6 L  -continue to decrease O2 if tolerated pt base 4-6 L at rest  -  8 L with ambulation  -may transfer to PCU
Comprehensive Nutrition Assessment    Type and Reason for Visit:  Initial, Consult (consult for poor appetite/po intake x 5+ days)    Nutrition Recommendations/Plan:   Continue ADULT DIET; Regular diet order. Added Ensure Enlive with breakfast and dinner meals. Monitor appetite, meal intake, and acceptance/intake of ONS. Monitor nutrition-related labs, bowel function, and weight trends. Malnutrition Assessment:  Malnutrition Status: At risk for malnutrition (03/27/23 1452)    Context:  Acute Illness     Findings of the 6 clinical characteristics of malnutrition:  Energy Intake:  50% or less of estimated energy requirements for 5 or more days  Weight Loss:  No significant weight loss     Body Fat Loss:  Unable to assess     Muscle Mass Loss:  Unable to assess    Fluid Accumulation:  No significant fluid accumulation     Strength:  Not Performed    Nutrition Assessment:    patient is nutritionally compromised AEB poor appetite/po intake x 5+ days PTA and respiratory dysfunction and she is at risk for further compromise d/t po intake < 50% of most meals thus far during admission, altered nutrition-related labs, and underweight status; will continue ADULT DIET; Regular diet order and add Ensure Enlive with breakfast and dinner meals    Nutrition Related Findings:    patient is A & O x 4; patient presented with worsening respiratory dysfunction; + BM on 3/26/23; patient was transferred to ICU overnight on 3/25/23; BS were elevated this am; she has low-dose SSI ordered at this time; she consumed 26-50% x 1 meal on 3/27/23; she consumed 1-25% of breakfast this am Wound Type: None       Current Nutrition Intake & Therapies:    Average Meal Intake: 26-50%, 1-25%  Average Supplements Intake: None Ordered  ADULT DIET;  Regular  ADULT ORAL NUTRITION SUPPLEMENT; Breakfast, Dinner; Standard High Calorie/High Protein Oral Supplement    Anthropometric Measures:  Height: 5' 3\" (160 cm)  Ideal Body Weight (IBW): 115
Handoff given to Juarez Brown RN for transfer of care
PM Assessment completed. Scheduled medications given per MAR, On 8L of O2, A&O X4, Vital Signs completed and charted, Patient denies any needs at this time. Call light within reach, Reminded patient to call RN if she needed anything.
Patient admitted to room 311 from [place] ED. Patient oriented to room, call light, bed rails, phone, lights and bathroom. Patient instructed about the schedule of the day including: vital sign frequency, lab draws, possible tests, frequency of MD and staff rounds, daily weights, I &O's and prescribed diet. Telemetry box in place, patient aware of placement and reason. Bed locked, in lowest position, side rails up 2/4, call light within reach. Recliner Assessment  Patient is able to demonstrate the ability to move from a reclining position to an upright position within the recliner. 4 Eyes Skin Assessment     The patient is being assess for   Admission    I agree that 2 RN's have performed a thorough Head to Toe Skin Assessment on the patient. ALL assessment sites listed below have been assessed. Areas assessed for pressure by both nurses:   [x]   Head, Face, and Ears   [x]   Shoulders, Back, and Chest, Abdomen  [x]   Arms, Elbows, and Hands   [x]   Coccyx, Sacrum, and Ischium  [x]   Legs, Feet, and Heels        Skin Assessed Under all Medical Devices by both nurses:  O2 device tubing                **SHARE this note so that the co-signing nurse is able to place an eSignature**    Co-signer eSignature: {[email or web address]:[id number]}    Does the Patient have Skin Breakdown related to pressure?   No              Amos Prevention initiated:  No   Wound Care Orders initiated:  NA      [facility] nurse consulted for Pressure Injury (Stage 3,4, Unstageable, DTI, NWPT, Complex wounds)and New or Established Ostomies:  NA      Primary Nurse eSignature: Electronically signed by Cheryl Sen RN on 3/25/23 at 4:19 AM EDT
Patient called out with difficulty breathing. In room, 02 sat 72% on 12 liters, increased oxygen to 15 liters high flow. Patient very anxious, calmed patient, patient remains on 15 liters 02, 02 sat improved after 2-3 minutes to 92 %, Dr. Lee Escobar notified, Casandra Musa notified, Respiratory therapy notified.
Patient okay for discharge per MD. Discharge instructions and script given. IV removed and site assessment clean, dry, and intact. Patient discharged home in stable conditions with all belongings including cell phone and lap top   No questions or concerns at this time. Patient escorted to personal car.
Patient resting in bed with eyes closed. Lungs decreased. Patient 98% on 6L. BS+. INT intact. No acute distress noted. Call light in reach. Bed check on for safety. Will continue to monitor.
Patient resting in bed,  at bedside. No acute distress noted. IV Solumedrol given. Patient denies any needs. Will continue to monitor.
Patient's 02 sat dropped to 60 % when up to the bedside toilet. Expressed importance of not getting out of bed from now on.   Respiratory called, patient changed to non-rebreather until 02 was about 92%, then placed back on 12 l igh flow NC
Patient's 02 sat dropped to 84% when sitting on the edge of the bed. Increased 02 to 12 liters. Patient refuses to use bedpan and insists on using bedside toilet. Meds given, assessment completed.
Pt A/O. On 7 lt High flow N/C. Uses NRBM when using BSC. VSS. Assessment complete as charted. Repositioned for comfort. Call light in reach. Denies needs. Will continue to monitor.
Pt A/O. On 8 lt high nasal cannula. Uses NRBM when gets up to use BSC.  VSS. Assessment complete as charted. Repositioned for comfort. Call light in reach. Denies needs. Will continue to monitor.
Pt ambulated using stand pivot to bedside commode and passed flatus and had x1 urination. Return to bed. Minimal effort by sat went to 75%. Non rebreather utilized for less than 5 minutes for pt to recover. Pt quite talkative after returning to bed. Plan of care reviewed. Pt verbalized understanding and has no questions at this time. Selvin Erickson Pt awake in bed. Pt states no needs at this time. Pt call light in reach. Pt knows to notify RN if there are any needs. All lines and monitoring devices in place. Pt states that she feels pretty well but states when her sats drop at home she has a very hard time getting her breath back.
Pt awake in bed. Pt states no needs at this time. Pt call light in reach. Pt knows to notify RN if there are any needs. All lines and monitoring devices in place. Plan of care reviewed. Pt verbalized understanding and has no questions at this time. Pt eating all medications at this time whole with yogurt. Pt family at bedside. Pt states she feels better at this time.
Pt came to ICU for increase oxygen demand. On 15 lt High flow. Chlorhexidine bath given. Pt admitted to Room 3014. Pt a/o. VSS. Jud to room. Denies c/o pain. Assessment complete as charted. Call light in reach. Denies other needs. Will continue to monitor. Pt is on Vapo therm 35 lt FiO2 60%. ABG drawn.
Pulmonary & Critical Care Medicine ICU Progress Note    CC: Respiratory failure    Events of Last 24 hours: On HFNC 6 LPM  No drips     Vascular lines: IV: PIVs         / / /FiO2 : 50 %  Recent Labs     23  2153   PHART 7.409   TNC5LRR 47.7*   PO2ART 94.9       IV:  REM      Vitals:  Blood pressure 127/67, pulse 77, temperature 98.4 °F (36.9 °C), temperature source Oral, resp. rate 22, height 5' 3\" (1.6 m), weight 107 lb 11.2 oz (48.9 kg), SpO2 100 %, not currently breastfeeding. on 6 LPM   Temp  Av.5 °F (36.9 °C)  Min: 97.9 °F (36.6 °C)  Max: 98.9 °F (37.2 °C)    Intake/Output Summary (Last 24 hours) at 3/28/2023 0742  Last data filed at 3/28/2023 0400  Gross per 24 hour   Intake 1408.73 ml   Output 50 ml   Net 1358.73 ml     PE:  General: ill appearing    Eyes: PERRL. No sclera icterus. No conjunctival injection. ENT: No discharge. Pharynx clear. Neck: Trachea midline. Normal thyroid. Resp: No accessory muscle use. Few crackles. No wheezing. No rhonchi. No dullness on percussion. CV: Regular rate. Regular rhythm. No mumur or rub. No edema. GI: Non-tender. Non-distended. No masses. No organomegaly. Normal bowel sounds. No hernia. Skin: Warm and dry. No nodule on exposed extremities. No rash on exposed extremities. Lymph: No cervical LAD. No supraclavicular LAD. M/S: No cyanosis. No joint deformity. No clubbing. Neuro: AAOx3.  Patellar reflexes are symmetric  Psych: No agitation, no anxiety, affect is full     Scheduled Meds:  REM      Data:  CBC:   Recent Labs     23  2113 23  0417 23  0907   WBC 11.2* 9.5 11.6*   HGB 8.1* 8.6* 9.2*   HCT 26.7* 28.2* 29.3*   MCV 81.6 81.1 79.1*    148 150     BMP:   Recent Labs     23  0417 23  0907    142 141   K 3.2* 3.8 3.9   CL 99 103 101   CO2 31 34* 35*   PHOS 1.8*  --   --    BUN 14 12 16   CREATININE <0.5* <0.5* <0.5*     LIVER PROFILE:   Recent Labs     23   AST 21   ALT 23
RT Inhaler-Nebulizer Bronchodilator Protocol Note    There is a bronchodilator order in the chart from a provider indicating to follow the RT Bronchodilator Protocol and there is an Initiate RT Inhaler-Nebulizer Bronchodilator Protocol order as well (see protocol at bottom of note). CXR Findings:  XR CHEST PORTABLE    Result Date: 3/24/2023  Patchy opacities in both lungs, nonspecific, but may reflect multifocal pneumonia versus pulmonary edema. Increased interstitial opacity. While much or all of this may be chronic, please correlate with any clinical evidence of acute interstitial edema. The findings from the last RT Protocol Assessment were as follows:   History Pulmonary Disease: (P) Chronic pulmonary disease  Respiratory Pattern: (P) Dyspnea on exertion or RR 21-25 bpm  Breath Sounds: (P) Slightly diminished and/or crackles  Cough: (P) Weak, non-productive  Indication for Bronchodilator Therapy: Decreased or absent breath sounds  Bronchodilator Assessment Score: (P) 9    Aerosolized bronchodilator medication orders have been revised according to the RT Inhaler-Nebulizer Bronchodilator Protocol below. Respiratory Therapist to perform RT Therapy Protocol Assessment initially then follow the protocol. Repeat RT Therapy Protocol Assessment PRN for score 0-3 or on second treatment, BID, and PRN for scores above 3. No Indications - adjust the frequency to every 6 hours PRN wheezing or bronchospasm, if no treatments needed after 48 hours then discontinue using Per Protocol order mode. If indication present, adjust the RT bronchodilator orders based on the Bronchodilator Assessment Score as indicated below.   Use Inhaler orders unless patient has one or more of the following: on home nebulizer, not able to hold breath for 10 seconds, is not alert and oriented, cannot activate and use MDI correctly, or respiratory rate 25 breaths per minute or more, then use the equivalent nebulizer order(s) with
RT Inhaler-Nebulizer Bronchodilator Protocol Note    There is a bronchodilator order in the chart from a provider indicating to follow the RT Bronchodilator Protocol and there is an Initiate RT Inhaler-Nebulizer Bronchodilator Protocol order as well (see protocol at bottom of note). CXR Findings:  XR CHEST PORTABLE    Result Date: 3/25/2023  Chronic lung changes but no acute lung disease       The findings from the last RT Protocol Assessment were as follows:   History Pulmonary Disease: Chronic pulmonary disease  Respiratory Pattern: Dyspnea on exertion or RR 21-25 bpm  Breath Sounds: Slightly diminished and/or crackles  Cough: Strong, productive  Indication for Bronchodilator Therapy: Decreased or absent breath sounds  Bronchodilator Assessment Score: 7    Aerosolized bronchodilator medication orders have been revised according to the RT Inhaler-Nebulizer Bronchodilator Protocol below. Respiratory Therapist to perform RT Therapy Protocol Assessment initially then follow the protocol. Repeat RT Therapy Protocol Assessment PRN for score 0-3 or on second treatment, BID, and PRN for scores above 3. No Indications - adjust the frequency to every 6 hours PRN wheezing or bronchospasm, if no treatments needed after 48 hours then discontinue using Per Protocol order mode. If indication present, adjust the RT bronchodilator orders based on the Bronchodilator Assessment Score as indicated below. Use Inhaler orders unless patient has one or more of the following: on home nebulizer, not able to hold breath for 10 seconds, is not alert and oriented, cannot activate and use MDI correctly, or respiratory rate 25 breaths per minute or more, then use the equivalent nebulizer order(s) with same Frequency and PRN reasons based on the score. If a patient is on this medication at home then do not decrease Frequency below that used at home.     0-3 - enter or revise RT bronchodilator order(s) to equivalent RT
Report given to Janell RN for transfer of care. Patient denies any needs at this time.  
Rounding completed with Dr. Romi Smallwood and interdisciplinary team.  POC, labs, lines and assessment reviewed.       -change levaquin to PO see MAR
Shift assessment completed, see flow sheet. Pt resting with eyes closed. Pt does answer questions is A/O x4. Pt denies any pain and states breathing has improved feels like baseline. SpO2 100%. Respirations are easy, even, and unlabored. Bilateral lung sounds diminished on 6 L.     VSS  NSR on the monitor    PIV, WNL    All lines and monitoring devices in place. Bed in lowest position with wheels locked. No needs expressed at this time. Will continue to monitor.
Shift assessment completed, see flow sheet. Pt sleeping upon entering room. Pt A/O. Exam completed. Pt denies any pain or increase SOB currently on 4 L of O2. SpO2 100%. Respirations are easy, even, and unlabored. Bilateral lung sounds diminished. VSS  NSR on the monitor       PIV, WNL     All lines and monitoring devices in placeBed in lowest position with wheels locked. No needs expressed at this time. Will continue to monitor.
Shift re-assessment completed see flowsheet    - pt continues on 6-8 L. Pt has been up to [facility] and will increase to 8 L with non rebreather. Pt has desat mid 80's but does recover quickly and tolerated pretty well. Pt denies any increase in SOB or CP.
Shift reassessment completed see flowsheet    -pt up to [facility] pt increase O2 to 8L while up and non rebreather as needed for SOB. Pt back in bed and O2 97% on RA.   Pt states does have SOB with excertion to [facility]
Shift reassessment completed see flowsheet    Pt up to [facility] w/ 8 L and non rebreather. Pt tolerated fairly well O2 maintained %.
Transferred to ICU room 14, Lizzy Handler given report on patient
Updated patient's status to Dr. Tomma Merlin, Orders to transfer patient to ICU, ABG once in the ICU, discussed plan of care with patient, discussed possibility of Bipap, vapotherm, and intubation with patient.   Called patient's  called and updated with plan of care
pt will need to home O2 orders for DC.  pt O2 company order is for 3 L continuous. pt is 4 L here & 8 L with excertion. She is also using the non rebreather often states also uses at home. We will need new orders for O2 company.   Message sent to Dr. Arielle Garcia to complete for DC
& dog. Spouse works 4pm-midnight 5 days/week. Daughter comes over 3+ days/week while  is gone. She helps with bathing, meal prep, and laundry. Home environment:   1 story home   Steps to enter first floor: Ramp  Steps to second floor: N/A  Bathroom: Walk in [place], [employer], Shower Chair  and comfort height toilet without grab bars   Equipment owned: rollator, manual WC, Shower Chair, BSC, lift chair, home O2 (4L -  goes up to 6-8L O2 when moving; has a NRB on her tank, and a concentrator) continuous, pulse ox and nebulizer      Preadmission Status:  Pt. Able to drive: No,  drives   Pt Fully independent with ADLs: Typically yes, but has needed assist recently. Pt. Required assistance from family for: Patient's spouse does most cooking, laundry, cleaning. Pt's spouse completes the yard work   Pt fully independent for transfers with use of manual WC. Has been doing pivot transfers WC to/from toilet  History of falls: No  Was getting home PT and she plans to resume upon going home. Objective:  Does this pt have an acute or acute on chronic diagnosis of CHF? No    Upper Extremity ROM/Strength  Please see OT evaluation. Lower Extremity ROM / Strength   AROM WFL: Yes    BLE strength impaired, but not formally assessed with MMT. Grossly at least 3+/5 bilat based on demonstrated functional mobility. Lower Extremity Sensation    NT    Coordination and Tone  NT    Balance  Static Sitting:  Good ; Supervision  Dynamic Sitting:  Good ; Supervision   Comments: at EOB and on BSC    Static Standing: Good - ; SBA  Dynamic Standing: Fair +; CGA  Comments: limited assessment 2/2 respiratory limitations. Able to complete toileting and pericare without physical assist.     Posture  Seated: Forward head and neck and Thoracic kyphosis  Standing:  Forward head and neck and Thoracic kyphosis    Bed Mobility   Supine to Sit:    Modified Independent  Sit to Supine:   Modified Independent  Rolling:   Independent
28.9*   MCV 81.1 81.6 79.0*    143 148         BMP:   Recent Labs     03/24/23 2020 03/25/23 2113 03/26/23  0417    141 142   K 3.9 3.2* 3.8    99 103   CO2 38* 31 34*   PHOS  --  1.8*  --    BUN 18 14 12   CREATININE <0.5* <0.5* <0.5*           MG:   Lab Results   Component Value Date/Time    MG 1.80 03/25/2023 09:13 PM     Ca/Phos:   Lab Results   Component Value Date    CALCIUM 8.7 03/26/2023    PHOS 1.8 (L) 03/25/2023     LIVER PROFILE:   Recent Labs     03/24/23 2020 03/25/23 2113   AST 30 21   ALT 27 23   BILITOT <0.2 <0.2   ALKPHOS 86 75           PT/INR: No results for input(s): PROTIME, INR in the last 72 hours. APTT: No results for input(s): APTT in the last 72 hours. Cardiac Enzymes:  Lab Results   Component Value Date    CKTOTAL 23 (L) 07/10/2019    TROPONINI <0.01 03/24/2023       Assessment:       -Acute hypoxic respiratory failure  Acute flare of ILD   Based on the biopsies, seems h/o of cryptogenic organizing pneumonia and possibly coexisting NSIP or chronic HP.    -History of COPD  -Chronic respiratory failure-      Plan:    *- continue to wean O2  *- consider bronch next 1-2 days if any worse   *-CXR looks the same like 3/2   *-will do work up for pneumonia ,so far negative   *- Agree with abx and steroids  BD  *-restart  pirfenidone 1 tab tid to increase gradually as Op ,done ,will work on that as Op  *s/p Pneumothostat with recurrent pneumo,CTS felt no pleurodeisis needed       Transplant clinic as OP   DVT px   If not better ,bronch on Monday       Thank you very much for allowing me to participate in the care of this pleasant patient , should you have any questions ,please do not hesitate to contact me      Iveth Davis MD,[facility]  Pulmonary&Critical Care Medicine    [place]    NOTE: This report was transcribed using voice recognition software.  Every effort was made to ensure accuracy; however, inadvertent computerized transcription errors may be
30 mg SubCUTAneous Daily    famotidine  20 mg Oral BID    predniSONE  40 mg Oral Daily    levofloxacin  750 mg IntraVENous Q24H    ipratropium-albuterol  1 ampule Inhalation TID    LORazepam  1 mg IntraVENous Once       Data:  CBC:   Recent Labs     03/24/23 2020 03/25/23 2113 03/26/23  0417   WBC 7.2 11.2* 9.5   HGB 9.1* 8.1* 8.6*   HCT 28.9* 26.7* 28.2*   MCV 79.0* 81.6 81.1    143 148     BMP:   Recent Labs     03/24/23 2020 03/25/23 2113 03/26/23  0417    141 142   K 3.9 3.2* 3.8    99 103   CO2 38* 31 34*   PHOS  --  1.8*  --    BUN 18 14 12   CREATININE <0.5* <0.5* <0.5*     LIVER PROFILE:   Recent Labs     03/24/23 2020 03/25/23 2113   AST 30 21   ALT 27 23   BILITOT <0.2 <0.2   ALKPHOS 80 75       Microbiology:  3/24 COVID-19 not detected  3/25 respiratory panel    Imaging:  Chest x-ray 3/25 imaging reviewed by me and showed  Chronic lung changes with no acute findings      ASSESSMENT:  Acute hypoxemic respiratory failure  ILD exacerbation-cryptogenic organizing pneumonia and possible NSIP/chronic HP on biopsy  COPD with AE   Chronic respiratory failure    PLAN:  Supplemental oxygen to maintain SaO2 >92%; wean as tolerated  Inhaled bronchodilators  IV Solu-Medrol ---> prednisone PO daily   Antibiotics Levaquin D#3  Bronchoscopy if not improving  Continue pirfenidone, plan to advance pirfenidone dose as an outpatient  Home medications were addressed  Blood sugar control ISS, with goal 150-180  DVT prophylaxis: Lovenox  MRSA prophylaxis: Bactroban
Bronchodilator order with Frequency of every 4 hours PRN for wheezing or increased work of breathing using Per Protocol order mode. 4-6 - enter or revise RT Bronchodilator order(s) to two equivalent RT bronchodilator orders with one order with BID Frequency and one order with Frequency of every 4 hours PRN wheezing or increased work of breathing using Per Protocol order mode. 7-10 - enter or revise RT Bronchodilator order(s) to two equivalent RT bronchodilator orders with one order with TID Frequency and one order with Frequency of every 4 hours PRN wheezing or increased work of breathing using Per Protocol order mode. 11-13 - enter or revise RT Bronchodilator order(s) to one equivalent RT bronchodilator order with QID Frequency and an Albuterol order with Frequency of every 4 hours PRN wheezing or increased work of breathing using Per Protocol order mode. Greater than 13 - enter or revise RT Bronchodilator order(s) to one equivalent RT bronchodilator order with every 4 hours Frequency and an Albuterol order with Frequency of every 2 hours PRN wheezing or increased work of breathing using Per Protocol order mode.        Electronically signed by Nj Saldivar RCP on 3/27/2023 at 7:09 PM
increase activity tolerance, better understand heart failure and disease management, be more comfortable, and unable to assess, will attempt again prior to discharge.
increased work of breathing using Per Protocol order mode. 4-6 - enter or revise RT Bronchodilator order(s) to two equivalent RT bronchodilator orders with one order with BID Frequency and one order with Frequency of every 4 hours PRN wheezing or increased work of breathing using Per Protocol order mode. 7-10 - enter or revise RT Bronchodilator order(s) to two equivalent RT bronchodilator orders with one order with TID Frequency and one order with Frequency of every 4 hours PRN wheezing or increased work of breathing using Per Protocol order mode. 11-13 - enter or revise RT Bronchodilator order(s) to one equivalent RT bronchodilator order with QID Frequency and an Albuterol order with Frequency of every 4 hours PRN wheezing or increased work of breathing using Per Protocol order mode. Greater than 13 - enter or revise RT Bronchodilator order(s) to one equivalent RT bronchodilator order with every 4 hours Frequency and an Albuterol order with Frequency of every 2 hours PRN wheezing or increased work of breathing using Per Protocol order mode.          Electronically signed by Lul Delacruz RCP on 3/28/2023 at 8:56 AM
not able to hold breath for 10 seconds, is not alert and oriented, cannot activate and use MDI correctly, or respiratory rate 25 breaths per minute or more, then use the equivalent nebulizer order(s) with same Frequency and PRN reasons based on the score. If a patient is on this medication at home then do not decrease Frequency below that used at home. 0-3 - enter or revise RT bronchodilator order(s) to equivalent RT Bronchodilator order with Frequency of every 4 hours PRN for wheezing or increased work of breathing using Per Protocol order mode. 4-6 - enter or revise RT Bronchodilator order(s) to two equivalent RT bronchodilator orders with one order with BID Frequency and one order with Frequency of every 4 hours PRN wheezing or increased work of breathing using Per Protocol order mode. 7-10 - enter or revise RT Bronchodilator order(s) to two equivalent RT bronchodilator orders with one order with TID Frequency and one order with Frequency of every 4 hours PRN wheezing or increased work of breathing using Per Protocol order mode. 11-13 - enter or revise RT Bronchodilator order(s) to one equivalent RT bronchodilator order with QID Frequency and an Albuterol order with Frequency of every 4 hours PRN wheezing or increased work of breathing using Per Protocol order mode. Greater than 13 - enter or revise RT Bronchodilator order(s) to one equivalent RT bronchodilator order with every 4 hours Frequency and an Albuterol order with Frequency of every 2 hours PRN wheezing or increased work of breathing using Per Protocol order mode.        Electronically signed by Kirill Machado RCP on 3/25/2023 at 3:58 AM
respiratory rate 25 breaths per minute or more, then use the equivalent nebulizer order(s) with same Frequency and PRN reasons based on the score. If a patient is on this medication at home then do not decrease Frequency below that used at home. 0-3 - enter or revise RT bronchodilator order(s) to equivalent RT Bronchodilator order with Frequency of every 4 hours PRN for wheezing or increased work of breathing using Per Protocol order mode. 4-6 - enter or revise RT Bronchodilator order(s) to two equivalent RT bronchodilator orders with one order with BID Frequency and one order with Frequency of every 4 hours PRN wheezing or increased work of breathing using Per Protocol order mode. 7-10 - enter or revise RT Bronchodilator order(s) to two equivalent RT bronchodilator orders with one order with TID Frequency and one order with Frequency of every 4 hours PRN wheezing or increased work of breathing using Per Protocol order mode. 11-13 - enter or revise RT Bronchodilator order(s) to one equivalent RT bronchodilator order with QID Frequency and an Albuterol order with Frequency of every 4 hours PRN wheezing or increased work of breathing using Per Protocol order mode. Greater than 13 - enter or revise RT Bronchodilator order(s) to one equivalent RT bronchodilator order with every 4 hours Frequency and an Albuterol order with Frequency of every 2 hours PRN wheezing or increased work of breathing using Per Protocol order mode. RT to enter RT Home Evaluation for COPD & MDI Assessment order using Per Protocol order mode.     Electronically signed by Sandro Estrada RCP on 3/26/2023 at 8:53 AM
Acute on chronic respiratory failure with hypoxia  History of ILD, concerning for exacerbation  Sats in 60s on home 6 L at rest  Covid/flu negative  D-dimer not significantly elevated  Procalcitonin is low  Started on IV solumedrol and empiric antibiotics, continue  Pulmonology consulted  Patient transferred to ICU night 3/25  Oxygen now at baseline. Patient to restart pirfenidone with gradual titration outpatient     PAF  Continue home medications  Not on a/c due to anemia and epistaxis     Diastolic CHF  Does not appear volume overloaded     Hx of Diabetes  Not on any medications      DVT Prophylaxis: Lovenox      Transfer to PCU.       Johnnie De La Torre MD 3/27/2023 2:57 PM
Component Value Date    LABA1C 5.2 03/03/2023      Body mass index is 18.95 kg/m².   Impression/Plan:        PLAN:     Acute on chronic respiratory failure with hypoxia  History of ILD, concerning for exacerbation  Sats in 60s on home 6 L at rest  Covid/flu negative  D-dimer not significantly elevated  Procalcitonin is low  Started on IV solumedrol and empiric antibiotics, continue  Pulmonology consulted  Patient transferred to ICU night 3/25  Weaning O2, may need bronch  Patient to restart pirfenidone with gradual titration outpatient     PAF  Continue home medications  Not on a/c due to anemia and epistaxis     Diastolic CHF  Does not appear volume overloaded     Hx of Diabetes  Not on any medications      DVT Prophylaxis: Lovenox    Discussed with patient and pulmonology    Electronically signed by: Melanie Weiss DO, 3/26/2023 4:59 PM
tolerance, ADLs, and IADLs. Pt functioning below baseline and will likely benefit from skilled occupational therapy services to maximize safety and independence. Recommending Home 24 hr assist and with home OT upon discharge as patient functioning close to baseline level    Goal(s) : To be met in 3 Visits:  Bed to toilet/BSC:       Modified Independent  Pt will complete 3/3 CHF goals     N/A    To be met in 5 Visits:  Supine to/from Sit in preparation for ADL task:   Supervision  Toileting        Supervision  Grooming       Supervision  Upper Body Dressing:      Supervision  Lower Body Dressing:      Supervision  Pt to demonstrate UE therapeutic exs x 15 reps with minimal cues    Rehabilitation Potential: Good  Strengths for achieving goals include: Pt motivated, PLOF, Family Support, and Pt cooperative   Barriers to achieving goals include:  Complexity of condition    Plan: To be seen 3-5 x/wk while in acute care setting for therapeutic exercises, bed mobility, transfers, family/patient education, ADL/IADL retraining, and energy conservation training.     Electronically signed by Iram Ibrahim OT on 3/28/2023 at 1:29 PM      If patient discharges from this facility prior to next visit, this note will serve as the Discharge Summary

## 2023-03-28 NOTE — PLAN OF CARE
Acute on chronic respiratory failure   - Pulmonary consult   - Solumedrol, abx,   - Breathing tx, supplemental oxygen
[facility]   HEART FAILURE PROGRAM      NAME:  Leela Obando  AGE: 77 y.o.    GENDER: female  : 1955  TODAY'S DATE:  3/28/2023    Subjective:     VISIT TYPE: Education / 27 Day Readmit     ADMIT DATE: 3/24/2023    PAST MEDICAL HISTORY:      Diagnosis Date    A-fib ([facility]     Anxiety     COPD (chronic obstructive pulmonary disease) ([facility]     Cryptogenic organizing pneumonia ([facility]     Depression     GERD (gastroesophageal reflux disease)     Hyperlipidemia     On home oxygen therapy     uses O2 3L prn    Rash     Thyroid disease     hypothyroidism     SCHEDULED MEDICATIONS:   atorvastatin  40 mg Oral Daily    levothyroxine  125 mcg Oral Daily    dilTIAZem  240 mg Oral Daily    mirtazapine  30 mg Oral Nightly    nadolol  20 mg Oral Daily    sertraline  100 mg Oral Daily    sodium chloride flush  5-40 mL IntraVENous 2 times per day    enoxaparin  30 mg SubCUTAneous Daily    famotidine  20 mg Oral BID    predniSONE  40 mg Oral Daily    insulin lispro  0-4 Units SubCUTAneous TID WC    insulin lispro  0-4 Units SubCUTAneous Nightly    mupirocin   Each Nostril BID    ipratropium-albuterol  1 ampule Inhalation BID    levoFLOXacin  750 mg Oral Daily     Objective:     ADMISSION DIAGNOSIS:   Elevated lactic acid level [R79.89]  Acute on chronic respiratory failure (HCC) [J96.20]  Acute on chronic respiratory failure with hypoxia (HCC) [J96.21]  Pneumonia of both lungs due to infectious organism, unspecified part of lung [J18.9]    WEIGHTS:    Admission weight: 107 lb (48.5 kg)   Wt Readings from Last 3 Encounters:   23 107 lb 11.2 oz (48.9 kg)   03/15/23 107 lb (48.5 kg)   23 104 lb 12.8 oz (47.5 kg)     INTAKE & OUTPUT:   Intake/Output Summary (Last 24 hours) at 3/28/2023 1510  Last data filed at 3/28/2023 1335  Gross per 24 hour   Intake 1588.73 ml   Output --   Net 1588.73 ml     ECHOCARDIOGRAM: EF is 55% as of     Assessment:     Patient sitting in bed at this time on  4 L
Problem: Discharge Planning  Goal: Discharge to home or other facility with appropriate resources  3/28/2023 1023 by Sadia Brennan RN  Outcome: Progressing  3/27/2023 2341 by Lanney Boas, RN  Flowsheets (Taken 3/27/2023 2341)  Discharge to home or other facility with appropriate resources:   Identify barriers to discharge with patient and caregiver   Identify discharge learning needs (meds, wound care, etc)     Problem: Safety - Adult  Goal: Free from fall injury  3/27/2023 2341 by Lanney Boas, RN  Flowsheets (Taken 3/27/2023 2341)  Free From Fall Injury:   Instruct family/caregiver on patient safety   Based on caregiver fall risk screen, instruct family/caregiver to ask for assistance with transferring infant if caregiver noted to have fall risk factors     Problem: Respiratory - Adult  Goal: Achieves optimal ventilation and oxygenation  3/28/2023 1023 by Sadia Brennan RN  Outcome: Progressing  3/27/2023 2341 by Lanney Boas, RN  Flowsheets (Taken 3/27/2023 2341)  Achieves optimal ventilation and oxygenation:   Position to facilitate oxygenation and minimize respiratory effort   Assess for changes in respiratory status     Problem: Chronic Conditions and Co-morbidities  Goal: Patient's chronic conditions and co-morbidity symptoms are monitored and maintained or improved  Outcome: Progressing
Problem: Discharge Planning  Goal: Discharge to home or other facility with appropriate resources  Flowsheets (Taken 3/27/2023 2341)  Discharge to home or other facility with appropriate resources:   Identify barriers to discharge with patient and caregiver   Identify discharge learning needs (meds, wound care, etc)     Problem: Safety - Adult  Goal: Free from fall injury  Flowsheets (Taken 3/27/2023 2341)  Free From Fall Injury:   Instruct family/caregiver on patient safety   Based on caregiver fall risk screen, instruct family/caregiver to ask for assistance with transferring infant if caregiver noted to have fall risk factors     Problem: ABCDS Injury Assessment  Goal: Absence of physical injury  Flowsheets (Taken 3/27/2023 0137)  Absence of Physical Injury: Implement safety measures based on patient assessment     Problem: Respiratory - Adult  Goal: Achieves optimal ventilation and oxygenation  Flowsheets (Taken 3/27/2023 2341)  Achieves optimal ventilation and oxygenation:   Position to facilitate oxygenation and minimize respiratory effort   Assess for changes in respiratory status     Problem: Cardiovascular - Adult  Goal: Maintains optimal cardiac output and hemodynamic stability  Flowsheets (Taken 3/27/2023 2341)  Maintains optimal cardiac output and hemodynamic stability:   Monitor blood pressure and heart rate   Monitor urine output and notify Licensed Independent Practitioner for values outside of normal range   HEART FAILURE CARE PLAN:    Comorbidities Reviewed: Yes   Patient has a past medical history of A-fib ([facility] Anxiety, COPD (chronic obstructive pulmonary disease) ([facility] Cryptogenic organizing pneumonia ([facility] [facility] Depression, GERD (gastroesophageal reflux disease), Hyperlipidemia, On home oxygen therapy, Rash, and Thyroid disease. ECHOCARDIOGRAM Reviewed: Yes   Patient's Ejection Fraction (EF) is greater than 40%    Weights Reviewed:  Yes   Admission weight: 107 lb (48.5 kg)   Wt Readings from
Problem: Discharge Planning  Goal: Discharge to home or other facility with appropriate resources  Outcome: Progressing     Problem: Safety - Adult  Goal: Free from fall injury  Outcome: Progressing     Problem: Skin/Tissue Integrity  Goal: Absence of new skin breakdown  Description: 1. Monitor for areas of redness and/or skin breakdown  2. Assess vascular access sites hourly  3. Every 4-6 hours minimum:  Change oxygen saturation probe site  4. Every 4-6 hours:  If on nasal continuous positive airway pressure, respiratory therapy assess nares and determine need for appliance change or resting period.   Outcome: Progressing     Problem: ABCDS Injury Assessment  Goal: Absence of physical injury  Outcome: Progressing
MEDICATIONS:   insulin lispro  0-4 Units SubCUTAneous TID WC    insulin lispro  0-4 Units SubCUTAneous Nightly    atorvastatin  40 mg Oral Daily    levothyroxine  125 mcg Oral Daily    furosemide  20 mg Oral See Admin Instructions    dilTIAZem  240 mg Oral Daily    mirtazapine  30 mg Oral Nightly    nadolol  20 mg Oral Daily    sertraline  100 mg Oral Daily    sodium chloride flush  5-40 mL IntraVENous 2 times per day    enoxaparin  30 mg SubCUTAneous Daily    famotidine  20 mg Oral BID    methylPREDNISolone  40 mg IntraVENous Q6H    Followed by    predniSONE  40 mg Oral Daily    levofloxacin  750 mg IntraVENous Q24H    ipratropium-albuterol  1 ampule Inhalation TID    LORazepam  1 mg IntraVENous Once     ACE/ARB/ARNI is REQUIRED for EF </= 92% SYSTOLIC FAILURE:   ACE[de-identified] None  ARB[de-identified] None  ARNI[de-identified] None    Evidenced-Based Beta Blocker is REQUIRED for EF </= 02% SYSTOLIC FAILURE:   [de-identified] Metoprolol SUCCinate- Toprol XL    Diuretics:  [de-identified] Furosemide    Diet Reviewed: Yes   ADULT DIET; Regular    Goal of Care Reviewed: Yes   Patient and/or Family's stated Goal of Care this Admission: reduce shortness of breath, increase activity tolerance, better understand heart failure and disease management, be more comfortable, reduce lower extremity edema, and unable to assess, will attempt again prior to discharge.
Summary (Last 24 hours) at 3/26/2023 0118  Last data filed at 3/26/2023 0036  Gross per 24 hour   Intake 1482.88 ml   Output 500 ml   Net 982.88 ml     Medications Reviewed: Yes   SCHEDULED HOSPITAL MEDICATIONS:   atorvastatin  40 mg Oral Daily    levothyroxine  125 mcg Oral Daily    furosemide  20 mg Oral See Admin Instructions    dilTIAZem  240 mg Oral Daily    mirtazapine  30 mg Oral Nightly    nadolol  20 mg Oral Daily    Pirfenidone  1 tablet Oral TID    sertraline  100 mg Oral Daily    sodium chloride flush  5-40 mL IntraVENous 2 times per day    enoxaparin  30 mg SubCUTAneous Daily    famotidine  20 mg Oral BID    methylPREDNISolone  40 mg IntraVENous Q6H    Followed by    Tramaine Hammond ON 3/27/2023] predniSONE  40 mg Oral Daily    levofloxacin  750 mg IntraVENous Q24H    ipratropium-albuterol  1 ampule Inhalation TID    LORazepam  1 mg IntraVENous Once    insulin lispro  0-4 Units SubCUTAneous Q4H     ACE/ARB/ARNI is REQUIRED for EF </= 03% SYSTOLIC FAILURE:   ACE[de-identified] None  ARB[de-identified] None  ARNI[de-identified] None    Evidenced-Based Beta Blocker is REQUIRED for EF </= 70% SYSTOLIC FAILURE:   [de-identified] None    Diuretics:  [de-identified] Furosemide    Diet Reviewed: Yes   Diet NPO Exceptions are: Ice Chips, Sips of Water with Meds    Goal of Care Reviewed: Yes   Patient and/or Family's stated Goal of Care this Admission: reduce shortness of breath, increase activity tolerance, better understand heart failure and disease management, be more comfortable, reduce lower extremity edema, and unable to assess, will attempt again prior to discharge.

## 2023-03-28 NOTE — CARE COORDINATION
DISCHARGE ORDER  Date/Time 3/28/2023 3:29 PM  Completed by: Julio Cesar Ruiz RN, Case Management    Patient Name: Umang Bryant      : 1955  Admitting Diagnosis: Elevated lactic acid level [R79.89]  Acute on chronic respiratory failure ([facility] [J96.20]  Acute on chronic respiratory failure with hypoxia ([facility] [address] [J96.21]  Pneumonia of both lungs due to infectious organism, unspecified part of lung [J18.9]      Admit order Date and Status: Inpatient 2023  (verify MD's last order for status of admission)      Noted discharge order. If applicable PT/OT recommendation at Discharge: Home with 24hr assistance and Home PT  DME needs for discharge: Needs Met    Confirmed discharge plan: Yes  with whom_______________  If pt confirmed DC plan does family need to be contacted by  No if yes who______    Discharge Plan: Discharge order noted. Reviewed chart and met with patient at bedside. Patient plans return home with [employer]. States family available  for assistance. Family to provide transport home  Spoke to Breda with [employer] who [facility] can provide SN/OT/PT with SOC within 48 hours. Patient on 4L of o2 at rest and up to 8L with activity. Spoke to FELICITY at [employer] and faxed new home o2 orders; Confirmed patient has 10L home concentrator. Date of Last IMM Given: 2023    Reviewed chart. Role of discharge planner explained and patient verbalized understanding. Discharge order is noted. Has Home O2 in place on admit:  Yes  Informed of need to bring portable home O2 tank on day of discharge for nursing to connect prior to leaving:   Yes  Verbalized agreement/Understanding:   Yes  Pt is being d/c'd to home today. Pt's O2 sats are 99% on 4L. Discharge timeout done with Tati Haque RN. All discharge needs and concerns addressed.

## 2023-03-29 ENCOUNTER — TELEPHONE (OUTPATIENT)
Dept: PULMONOLOGY | Age: 68
End: 2023-03-29

## 2023-03-29 ENCOUNTER — CARE COORDINATION (OUTPATIENT)
Dept: CASE MANAGEMENT | Age: 68
End: 2023-03-29

## 2023-03-29 DIAGNOSIS — J96.01 ACUTE HYPOXEMIC RESPIRATORY FAILURE (HCC): Primary | ICD-10-CM

## 2023-03-29 PROCEDURE — 1111F DSCHRG MED/CURRENT MED MERGE: CPT | Performed by: FAMILY MEDICINE

## 2023-03-29 NOTE — CARE COORDINATION
[id number]    Offered patient enrollment in the Remote Patient Monitoring (RPM) program for in-home monitoring: Patient is not eligible for RPM program.    Care Transitions 24 Hour Call    Schedule Follow Up Appointment with PCP: Completed  Do you have a copy of your discharge instructions?: Yes  Do you have all of your prescriptions and are they filled?: No  Have you been contacted by a [facility] Pharmacist?: No  Have you scheduled your follow up appointment?: Yes  Post Acute Services: Home Health  Patient DME: Anne-Marie Martinez chair  Patient Home Equipment: Nebulizer, Oxygen  Do you have support at home?: Partner/Spouse/SO  Are you an active caregiver in your home?: No  Care Transitions Interventions       Follow Up  Future Appointments   Date Time Provider Calvin Mcmullen   4/3/2023  2:15 PM Priscilla Fregoso MD [address] [place] [address]   4/13/2023 10:00 AM Siena Llanos MD [facility]   5/17/2023 10:00 AM PANDA Zhao - GISELLA Holbrook [facility]   6/22/2023 11:30 AM MD Jeremías Turner [facility]     Care Transition Nurse provided contact information. Plan for follow-up call in 1-2 days based on severity of symptoms and risk factors.   Plan for next call: symptom management-breathing  medication management-pirfenidone    Bruce Silver RN  Care Transition Nurse  [phone] mobile

## 2023-03-29 NOTE — TELEPHONE ENCOUNTER
Bhakti called stating that [facility] pharmacy needs a call from our office before they can dispense Pirfenidone. [phone].

## 2023-03-30 NOTE — TELEPHONE ENCOUNTER
Spoke with pharmacy whom states they received 2 rxs a day apart and both rxs had different sigs.  I clarified with the correct sig per telephone encounter 3/10/23

## 2023-03-31 ENCOUNTER — CARE COORDINATION (OUTPATIENT)
Dept: CASE MANAGEMENT | Age: 68
End: 2023-03-31

## 2023-03-31 NOTE — CARE COORDINATION
[facility] Care Transitions Follow Up Call      Patient: Marisol Weiss  Patient : 1955   MRN: [id number]  Reason for Admission: Readmission from home -> acute on chronic respiratory failure with hypoxia, ILD exacerbation, PNA, COPD with AE, PAF-not on AC 2/2 anemia and epistaxis, dCHF, DM, anxiety, HLD, hypothyroidism -> home with [facility], PASSPORT HHA (daughter), O2 ([place]) 4lpm at rest and 8lpm with exertion (non rebreather prn) with 10L home concentrator  Discharge Date: 3/28/23 RARS: Readmission Risk Score: 37.7    CTN attempted follow-up outreach to patient. Message left including CTN contact information.      Follow Up  Future Appointments   Date Time Provider Calvin Mcmullen   4/3/2023  2:15 PM Darien Kinney MD [address] [facility] [place] [facility]   2023 10:00 AM Jose E Landon MD [facility]   2023 10:00 AM PANDA Ambriz - GISELLA Brantley [facility]   2023 11:30 AM Ani Baires MD [facility]     Day Camarillo RN  Care Transition Nurse  [phone] mobile

## 2023-04-02 PROBLEM — J84.89: Status: ACTIVE | Noted: 2023-04-02

## 2023-04-05 ENCOUNTER — CARE COORDINATION (OUTPATIENT)
Dept: CASE MANAGEMENT | Age: 68
End: 2023-04-05

## 2023-04-05 NOTE — CARE COORDINATION
[address] Care Transitions Follow Up Call      Patient: Jair Montes  Patient : 1955   MRN: [id number]  Reason for Admission: Readmission from home -> acute on chronic respiratory failure with hypoxia, ILD exacerbation, PNA, COPD with AE, PAF-not on AC 2/2 anemia and epistaxis, dCHF, DM, anxiety, HLD, hypothyroidism -> home with [facility], PASSPORT HHA (daughter), O2 ([place]) 4lpm at rest and 8lpm with exertion (non rebreather prn) with 10L home concentrator, workup for Watchman  Discharge Date: 3/28/23 RARS: Readmission Risk Score: 37.7    CTN attempted follow-up outreach to patient. Message left including CTN contact information. No further CTN outreach scheduled at this time.       Follow Up  Future Appointments   Date Time Provider Calvin Mcmullen   4/10/2023  9:30 AM Yarely Gomez MD [facility]   2023 10:00 AM Eva Joshi MD [facility]   2023 10:00 AM PANDA Espinoza - GISELLA Brooks [facility]   2023 11:30 AM Ani Guidry MD [facility]     Marjorie Alav, RN  Care Transition Nurse  [phone] mobile

## 2023-04-06 ENCOUNTER — HOSPITAL ENCOUNTER (EMERGENCY)
Age: 68
Discharge: HOME | End: 2023-04-06
Attending: EMERGENCY MEDICINE
Payer: MEDICARE

## 2023-04-06 ENCOUNTER — TELEPHONE (OUTPATIENT)
Dept: OTHER | Facility: CLINIC | Age: 68
End: 2023-04-06

## 2023-04-06 ENCOUNTER — APPOINTMENT (OUTPATIENT)
Dept: GENERAL RADIOLOGY | Age: 68
End: 2023-04-06
Payer: MEDICARE

## 2023-04-06 VITALS
SYSTOLIC BLOOD PRESSURE: 111 MMHG | HEART RATE: 82 BPM | DIASTOLIC BLOOD PRESSURE: 63 MMHG | WEIGHT: 110 LBS | TEMPERATURE: 97.5 F | RESPIRATION RATE: 28 BRPM | OXYGEN SATURATION: 95 % | HEIGHT: 63 IN | BODY MASS INDEX: 19.49 KG/M2

## 2023-04-06 DIAGNOSIS — J96.11 CHRONIC RESPIRATORY FAILURE WITH HYPOXIA (HCC): Primary | ICD-10-CM

## 2023-04-06 LAB
ALBUMIN SERPL-MCNC: 3.7 G/DL (ref 3.4–5)
ALBUMIN/GLOB SERPL: 1.4 {RATIO} (ref 1.1–2.2)
ALP SERPL-CCNC: 85 U/L (ref 40–129)
ALT SERPL-CCNC: 24 U/L (ref 10–40)
ANION GAP SERPL CALCULATED.3IONS-SCNC: 6 MMOL/L (ref 3–16)
AST SERPL-CCNC: 25 U/L (ref 15–37)
BASE EXCESS BLDV CALC-SCNC: 7.6 MMOL/L (ref -3–3)
BASOPHILS # BLD: 0 K/UL (ref 0–0.2)
BASOPHILS NFR BLD: 0.4 %
BILIRUB SERPL-MCNC: <0.2 MG/DL (ref 0–1)
BUN SERPL-MCNC: 19 MG/DL (ref 7–20)
CALCIUM SERPL-MCNC: 9 MG/DL (ref 8.3–10.6)
CHLORIDE SERPL-SCNC: 99 MMOL/L (ref 99–110)
CO2 BLDV-SCNC: 37 MMOL/L
CO2 SERPL-SCNC: 35 MMOL/L (ref 21–32)
COHGB MFR BLDV: 3.5 % (ref 0–1.5)
CREAT SERPL-MCNC: <0.5 MG/DL (ref 0.6–1.2)
D DIMER: 0.28 UG/ML FEU (ref 0–0.6)
DEPRECATED RDW RBC AUTO: 16.8 % (ref 12.4–15.4)
EOSINOPHIL # BLD: 0.1 K/UL (ref 0–0.6)
EOSINOPHIL NFR BLD: 0.7 %
GFR SERPLBLD CREATININE-BSD FMLA CKD-EPI: >60 ML/MIN/{1.73_M2}
GLUCOSE SERPL-MCNC: 151 MG/DL (ref 70–99)
HCO3 BLDV-SCNC: 34.8 MMOL/L (ref 23–29)
HCT VFR BLD AUTO: 29.3 % (ref 36–48)
HGB BLD-MCNC: 9.2 G/DL (ref 12–16)
LYMPHOCYTES # BLD: 1 K/UL (ref 1–5.1)
LYMPHOCYTES NFR BLD: 10.3 %
MCH RBC QN AUTO: 24.6 PG (ref 26–34)
MCHC RBC AUTO-ENTMCNC: 31.3 G/DL (ref 31–36)
MCV RBC AUTO: 78.5 FL (ref 80–100)
METHGB MFR BLDV: 0.3 %
MONOCYTES # BLD: 0.5 K/UL (ref 0–1.3)
MONOCYTES NFR BLD: 4.9 %
NEUTROPHILS # BLD: 8.5 K/UL (ref 1.7–7.7)
NEUTROPHILS NFR BLD: 83.7 %
NT-PROBNP SERPL-MCNC: 592 PG/ML (ref 0–124)
O2 CT VFR BLDV CALC: 8 VOL %
O2 THERAPY: ABNORMAL
PCO2 BLDV: 64 MMHG (ref 40–50)
PH BLDV: 7.35 [PH] (ref 7.35–7.45)
PLATELET # BLD AUTO: 165 K/UL (ref 135–450)
PMV BLD AUTO: 7.5 FL (ref 5–10.5)
PO2 BLDV: 28.5 MMHG (ref 25–40)
POTASSIUM SERPL-SCNC: 4.1 MMOL/L (ref 3.5–5.1)
PROT SERPL-MCNC: 6.4 G/DL (ref 6.4–8.2)
RBC # BLD AUTO: 3.73 M/UL (ref 4–5.2)
SAO2 % BLDV: 49 %
SODIUM SERPL-SCNC: 140 MMOL/L (ref 136–145)
TROPONIN T SERPL-MCNC: <0.01 NG/ML
WBC # BLD AUTO: 10.2 K/UL (ref 4–11)

## 2023-04-06 PROCEDURE — 71045 X-RAY EXAM CHEST 1 VIEW: CPT

## 2023-04-06 PROCEDURE — 93005 ELECTROCARDIOGRAM TRACING: CPT | Performed by: EMERGENCY MEDICINE

## 2023-04-06 PROCEDURE — 82803 BLOOD GASES ANY COMBINATION: CPT

## 2023-04-06 PROCEDURE — 85025 COMPLETE CBC W/AUTO DIFF WBC: CPT

## 2023-04-06 PROCEDURE — 85379 FIBRIN DEGRADATION QUANT: CPT

## 2023-04-06 PROCEDURE — 6370000000 HC RX 637 (ALT 250 FOR IP): Performed by: EMERGENCY MEDICINE

## 2023-04-06 PROCEDURE — 84484 ASSAY OF TROPONIN QUANT: CPT

## 2023-04-06 PROCEDURE — 83880 ASSAY OF NATRIURETIC PEPTIDE: CPT

## 2023-04-06 PROCEDURE — 80053 COMPREHEN METABOLIC PANEL: CPT

## 2023-04-06 PROCEDURE — 36415 COLL VENOUS BLD VENIPUNCTURE: CPT

## 2023-04-06 RX ORDER — IPRATROPIUM BROMIDE AND ALBUTEROL SULFATE 2.5; .5 MG/3ML; MG/3ML
1 SOLUTION RESPIRATORY (INHALATION) ONCE
Status: COMPLETED | OUTPATIENT
Start: 2023-04-06 | End: 2023-04-06

## 2023-04-06 RX ADMIN — IPRATROPIUM BROMIDE AND ALBUTEROL SULFATE 1 AMPULE: 2.5; .5 SOLUTION RESPIRATORY (INHALATION) at 20:00

## 2023-04-06 ASSESSMENT — PAIN - FUNCTIONAL ASSESSMENT
PAIN_FUNCTIONAL_ASSESSMENT: NONE - DENIES PAIN
PAIN_FUNCTIONAL_ASSESSMENT: 0-10

## 2023-04-06 ASSESSMENT — PAIN SCALES - GENERAL: PAINLEVEL_OUTOF10: 0

## 2023-04-06 NOTE — ED PROVIDER NOTES
negative. Low suspicion for pneumonia. Suspect acute exacerbation of her chronic symptoms for which she was just recently admitted to the hospital.  Do not feel antibiotics are indicated. Discussed possible admission for observation but the patient actually was feeling much better and requested to go home. Given that I feel this is just an acute exacerbation of her chronic symptoms without need for acute intervention or treatment I felt this was reasonable but did give her very strict return precautions. Plan to discharge home. Currently on 4 to 6 L at this time and doing well    Medications and Route:   Medications   ipratropium-albuterol (DUONEB) nebulizer solution 1 ampule (1 ampule Inhalation Given 4/6/23 2000)       History From: Patient         Chronic Conditions: Noted in HPI    CONSULTS: (Who and What was discussed)  None        Records Reviewed : Inpatient Notes reviewed most recent discharge summary from late last month    Phuong GONZALEZ M.D., am the primary clinician of record. MDM      CONSULTS     None    Critical Care:   None    REASSESSMENT          PROCEDURE     Unless otherwise noted below, none     Procedures      FINAL IMPRESSION      1. Chronic respiratory failure with hypoxia [facility])            DISPOSITION/PLAN   DISPOSITION Decision To Discharge 04/06/2023 08:48:54 PM        PATIENT REFERRED TO:  Surya Marie MD  [address]  [facility] [address]  [phone]    Schedule an appointment as soon as possible for a visit         DISCHARGE MEDICATIONS:  Discharge Medication List as of 4/6/2023  8:51 PM        Controlled Substances Monitoring:     RX Monitoring 2/28/2023   Attestation -   Periodic Controlled Substance Monitoring Possible medication side effects, risk of tolerance/dependence & alternative treatments discussed. ;Obtaining appropriate analgesic effect of treatment.    Chronic Pain > 50 MEDD -   Chronic Pain > 80 MEDD -       (Please note that portions of this note were completed

## 2023-04-06 NOTE — TELEPHONE ENCOUNTER
Writer contacted Dr Carrie Clemons to inform of 30 day readmission risk. 's attempt to contact Dr Carrie Clemons was unsuccessful.      Call Back: If you need to call back to inform of disposition you can contact me at [phone]

## 2023-04-06 NOTE — ED NOTES
Pt o2 decreased to [address] on HFNC and maintaining at 100%.       Fatimah Haywood RN  04/06/23 6807

## 2023-04-07 LAB
EKG ATRIAL RATE: 73 BPM
EKG DIAGNOSIS: NORMAL
EKG P AXIS: 42 DEGREES
EKG P-R INTERVAL: 154 MS
EKG Q-T INTERVAL: 366 MS
EKG QRS DURATION: 80 MS
EKG QTC CALCULATION (BAZETT): 403 MS
EKG R AXIS: 17 DEGREES
EKG T AXIS: -6 DEGREES
EKG VENTRICULAR RATE: 73 BPM

## 2023-04-07 PROCEDURE — 93010 ELECTROCARDIOGRAM REPORT: CPT | Performed by: INTERNAL MEDICINE

## 2023-04-07 NOTE — ED NOTES
This RN assisted the pt to a bedside commode, pt tolerated well but difficulty recovering her o2 sat when returning to bed. Pt placed on NRB per her request and increased to 12L. Pt requesting breathing tx, MD aware.       Daniela Lu RN  04/06/23 2010

## 2023-04-09 ENCOUNTER — TELEPHONE (OUTPATIENT)
Dept: OTHER | Facility: CLINIC | Age: 68
End: 2023-04-09

## 2023-04-10 NOTE — TELEPHONE ENCOUNTER
Writer contacted ED provider to inform of 30 day readmission risk. ED provider informed writer of intention to discharge and follow up recommended.       Call Back: If you need to call back to inform of disposition you can contact me at [phone]

## 2023-04-11 ENCOUNTER — TELEPHONE (OUTPATIENT)
Dept: CARDIOLOGY CLINIC | Age: 68
End: 2023-04-11

## 2023-04-14 ENCOUNTER — TELEPHONE (OUTPATIENT)
Dept: FAMILY MEDICINE CLINIC | Age: 68
End: 2023-04-14

## 2023-04-17 ENCOUNTER — HOSPITAL ENCOUNTER (INPATIENT)
Age: 68
LOS: 4 days | Discharge: HOME | End: 2023-04-21
Attending: STUDENT IN AN ORGANIZED HEALTH CARE EDUCATION/TRAINING PROGRAM | Admitting: INTERNAL MEDICINE
Payer: MEDICARE

## 2023-04-17 ENCOUNTER — APPOINTMENT (OUTPATIENT)
Dept: GENERAL RADIOLOGY | Age: 68
End: 2023-04-17
Payer: MEDICARE

## 2023-04-17 DIAGNOSIS — J96.21 ACUTE ON CHRONIC RESPIRATORY FAILURE WITH HYPOXIA AND HYPERCAPNIA (HCC): Primary | ICD-10-CM

## 2023-04-17 DIAGNOSIS — J84.10 PULMONARY FIBROSIS (HCC): ICD-10-CM

## 2023-04-17 DIAGNOSIS — J44.1 COPD EXACERBATION (HCC): ICD-10-CM

## 2023-04-17 DIAGNOSIS — J96.22 ACUTE ON CHRONIC RESPIRATORY FAILURE WITH HYPOXIA AND HYPERCAPNIA (HCC): Primary | ICD-10-CM

## 2023-04-17 LAB
ALBUMIN SERPL-MCNC: 4 G/DL (ref 3.4–5)
ALBUMIN/GLOB SERPL: 1.5 {RATIO} (ref 1.1–2.2)
ALP SERPL-CCNC: 107 U/L (ref 40–129)
ALT SERPL-CCNC: 21 U/L (ref 10–40)
ANION GAP SERPL CALCULATED.3IONS-SCNC: 8 MMOL/L (ref 3–16)
AST SERPL-CCNC: 19 U/L (ref 15–37)
BASE EXCESS BLDA CALC-SCNC: 6.6 MMOL/L (ref -3–3)
BASE EXCESS BLDA CALC-SCNC: 6.7 MMOL/L (ref -3–3)
BASE EXCESS BLDV CALC-SCNC: 5.6 MMOL/L (ref -3–3)
BASOPHILS # BLD: 0 K/UL (ref 0–0.2)
BASOPHILS NFR BLD: 0.4 %
BILIRUB SERPL-MCNC: <0.2 MG/DL (ref 0–1)
BUN SERPL-MCNC: 16 MG/DL (ref 7–20)
CALCIUM SERPL-MCNC: 9.2 MG/DL (ref 8.3–10.6)
CHLORIDE SERPL-SCNC: 102 MMOL/L (ref 99–110)
CO2 BLDA-SCNC: 34.1 MMOL/L
CO2 BLDA-SCNC: 35.5 MMOL/L
CO2 BLDV-SCNC: 37 MMOL/L
CO2 SERPL-SCNC: 34 MMOL/L (ref 21–32)
COHGB MFR BLDA: 0.4 % (ref 0–1.5)
COHGB MFR BLDA: 0.4 % (ref 0–1.5)
COHGB MFR BLDV: 1 % (ref 0–1.5)
CREAT SERPL-MCNC: <0.5 MG/DL (ref 0.6–1.2)
DEPRECATED RDW RBC AUTO: 16.5 % (ref 12.4–15.4)
EOSINOPHIL # BLD: 0.4 K/UL (ref 0–0.6)
EOSINOPHIL NFR BLD: 4.6 %
FLUAV RNA UPPER RESP QL NAA+PROBE: NEGATIVE
FLUBV AG NPH QL: NEGATIVE
GFR SERPLBLD CREATININE-BSD FMLA CKD-EPI: >60 ML/MIN/{1.73_M2}
GLUCOSE SERPL-MCNC: 174 MG/DL (ref 70–99)
HCO3 BLDA-SCNC: 32.5 MMOL/L (ref 21–29)
HCO3 BLDA-SCNC: 33.6 MMOL/L (ref 21–29)
HCO3 BLDV-SCNC: 34.6 MMOL/L (ref 23–29)
HCT VFR BLD AUTO: 31.2 % (ref 36–48)
HGB BLD-MCNC: 9.7 G/DL (ref 12–16)
HGB BLDA-MCNC: 9.1 G/DL (ref 12–16)
HGB BLDA-MCNC: 9.5 G/DL (ref 12–16)
LACTATE BLDV-SCNC: 1.8 MMOL/L (ref 0.4–1.9)
LYMPHOCYTES # BLD: 0.8 K/UL (ref 1–5.1)
LYMPHOCYTES NFR BLD: 10.2 %
MAGNESIUM SERPL-MCNC: 1.7 MG/DL (ref 1.8–2.4)
MCH RBC QN AUTO: 24.1 PG (ref 26–34)
MCHC RBC AUTO-ENTMCNC: 31.1 G/DL (ref 31–36)
MCV RBC AUTO: 77.5 FL (ref 80–100)
METHGB MFR BLDA: 0.1 %
METHGB MFR BLDA: 0.3 %
METHGB MFR BLDV: 0.3 %
MONOCYTES # BLD: 0.5 K/UL (ref 0–1.3)
MONOCYTES NFR BLD: 6.3 %
NEUTROPHILS # BLD: 6 K/UL (ref 1.7–7.7)
NEUTROPHILS NFR BLD: 78.5 %
NT-PROBNP SERPL-MCNC: 1142 PG/ML (ref 0–124)
O2 CT VFR BLDV CALC: 5 VOL %
O2 THERAPY: ABNORMAL
PCO2 BLDA: 53.6 MMHG (ref 35–45)
PCO2 BLDA: 62.6 MMHG (ref 35–45)
PCO2 BLDV: 78.8 MMHG (ref 40–50)
PH BLDA: 7.35 [PH] (ref 7.35–7.45)
PH BLDA: 7.4 [PH] (ref 7.35–7.45)
PH BLDV: 7.26 [PH] (ref 7.35–7.45)
PLATELET # BLD AUTO: 145 K/UL (ref 135–450)
PMV BLD AUTO: 7.3 FL (ref 5–10.5)
PO2 BLDA: 43.8 MMHG (ref 75–108)
PO2 BLDA: 96.8 MMHG (ref 75–108)
PO2 BLDV: 22.8 MMHG (ref 25–40)
POTASSIUM SERPL-SCNC: 3.4 MMOL/L (ref 3.5–5.1)
PROT SERPL-MCNC: 6.7 G/DL (ref 6.4–8.2)
RBC # BLD AUTO: 4.02 M/UL (ref 4–5.2)
SAO2 % BLDA: 78.8 %
SAO2 % BLDA: 96.8 %
SAO2 % BLDV: 30 %
SARS-COV-2 RDRP RESP QL NAA+PROBE: NOT DETECTED
SODIUM SERPL-SCNC: 144 MMOL/L (ref 136–145)
TROPONIN T SERPL-MCNC: <0.01 NG/ML
WBC # BLD AUTO: 7.7 K/UL (ref 4–11)

## 2023-04-17 PROCEDURE — 85025 COMPLETE CBC W/AUTO DIFF WBC: CPT

## 2023-04-17 PROCEDURE — 99285 EMERGENCY DEPT VISIT HI MDM: CPT

## 2023-04-17 PROCEDURE — 93005 ELECTROCARDIOGRAM TRACING: CPT | Performed by: PHYSICIAN ASSISTANT

## 2023-04-17 PROCEDURE — 83880 ASSAY OF NATRIURETIC PEPTIDE: CPT

## 2023-04-17 PROCEDURE — 36415 COLL VENOUS BLD VENIPUNCTURE: CPT

## 2023-04-17 PROCEDURE — 2580000003 HC RX 258: Performed by: STUDENT IN AN ORGANIZED HEALTH CARE EDUCATION/TRAINING PROGRAM

## 2023-04-17 PROCEDURE — 96365 THER/PROPH/DIAG IV INF INIT: CPT

## 2023-04-17 PROCEDURE — 82803 BLOOD GASES ANY COMBINATION: CPT

## 2023-04-17 PROCEDURE — 87635 SARS-COV-2 COVID-19 AMP PRB: CPT

## 2023-04-17 PROCEDURE — 96367 TX/PROPH/DG ADDL SEQ IV INF: CPT

## 2023-04-17 PROCEDURE — 71045 X-RAY EXAM CHEST 1 VIEW: CPT

## 2023-04-17 PROCEDURE — 80053 COMPREHEN METABOLIC PANEL: CPT

## 2023-04-17 PROCEDURE — 6370000000 HC RX 637 (ALT 250 FOR IP): Performed by: STUDENT IN AN ORGANIZED HEALTH CARE EDUCATION/TRAINING PROGRAM

## 2023-04-17 PROCEDURE — 84484 ASSAY OF TROPONIN QUANT: CPT

## 2023-04-17 PROCEDURE — 6360000002 HC RX W HCPCS: Performed by: PHYSICIAN ASSISTANT

## 2023-04-17 PROCEDURE — 87804 INFLUENZA ASSAY W/OPTIC: CPT

## 2023-04-17 PROCEDURE — 96375 TX/PRO/DX INJ NEW DRUG ADDON: CPT

## 2023-04-17 PROCEDURE — 87040 BLOOD CULTURE FOR BACTERIA: CPT

## 2023-04-17 PROCEDURE — 6360000002 HC RX W HCPCS: Performed by: STUDENT IN AN ORGANIZED HEALTH CARE EDUCATION/TRAINING PROGRAM

## 2023-04-17 PROCEDURE — 83735 ASSAY OF MAGNESIUM: CPT

## 2023-04-17 PROCEDURE — 96366 THER/PROPH/DIAG IV INF ADDON: CPT

## 2023-04-17 PROCEDURE — 2060000000 HC ICU INTERMEDIATE R&B

## 2023-04-17 PROCEDURE — 83605 ASSAY OF LACTIC ACID: CPT

## 2023-04-17 RX ORDER — POTASSIUM CHLORIDE 750 MG/1
40 TABLET, EXTENDED RELEASE ORAL ONCE
Status: COMPLETED | OUTPATIENT
Start: 2023-04-17 | End: 2023-04-17

## 2023-04-17 RX ORDER — ALBUTEROL SULFATE 2.5 MG/3ML
2.5 SOLUTION RESPIRATORY (INHALATION) ONCE
Status: COMPLETED | OUTPATIENT
Start: 2023-04-17 | End: 2023-04-17

## 2023-04-17 RX ORDER — MAGNESIUM SULFATE IN WATER 40 MG/ML
2000 INJECTION, SOLUTION INTRAVENOUS ONCE
Status: COMPLETED | OUTPATIENT
Start: 2023-04-17 | End: 2023-04-17

## 2023-04-17 RX ORDER — METHYLPREDNISOLONE SODIUM SUCCINATE 125 MG/2ML
80 INJECTION, POWDER, LYOPHILIZED, FOR SOLUTION INTRAMUSCULAR; INTRAVENOUS ONCE
Status: COMPLETED | OUTPATIENT
Start: 2023-04-17 | End: 2023-04-17

## 2023-04-17 RX ADMIN — POTASSIUM CHLORIDE 40 MEQ: 750 TABLET, EXTENDED RELEASE ORAL at 19:46

## 2023-04-17 RX ADMIN — ALBUTEROL SULFATE 2.5 MG: 2.5 SOLUTION RESPIRATORY (INHALATION) at 21:24

## 2023-04-17 RX ADMIN — ALBUTEROL SULFATE 2.5 MG: 2.5 SOLUTION RESPIRATORY (INHALATION) at 19:10

## 2023-04-17 RX ADMIN — AZITHROMYCIN DIHYDRATE 500 MG: 500 INJECTION, POWDER, LYOPHILIZED, FOR SOLUTION INTRAVENOUS at 19:47

## 2023-04-17 RX ADMIN — MAGNESIUM SULFATE HEPTAHYDRATE 2000 MG: 40 INJECTION, SOLUTION INTRAVENOUS at 21:22

## 2023-04-17 RX ADMIN — METHYLPREDNISOLONE SODIUM SUCCINATE 80 MG: 125 INJECTION, POWDER, FOR SOLUTION INTRAMUSCULAR; INTRAVENOUS at 19:10

## 2023-04-17 ASSESSMENT — PAIN - FUNCTIONAL ASSESSMENT: PAIN_FUNCTIONAL_ASSESSMENT: NONE - DENIES PAIN

## 2023-04-17 NOTE — ED PROVIDER NOTES
[address] [facility]        Pt Name: Cassie Franco  MRN: [id number]  [place] 1955  Date of evaluation: 4/17/2023  Provider: Earl Hudson PA-C  PCP: Kathryn Stanley MD  Note Started: 6:44 PM EDT 4/17/23       I have seen and evaluated this patient with my supervising physician Francis Antoine, [address] [facility]       Chief Complaint   Patient presents with    Shortness of Breath     Pt states that she started feeling SOB yesterday. Pt usually wears 6-8L at home. Pt currently on 10 at this time. HISTORY OF PRESENT ILLNESS: 1 or more Elements     History From: Patient  Limitations to history : None    Armin Obando is a 77 y.o. female who presents for evaluation of feeling tired and short of breath. She has chronic respiratory failure has COPD on 68 L of oxygen at home was brought in on a nonrebreather at 10L. Denies any chest pain. No worsening cough and usual.  No fever. No leg swelling. Nursing Notes were all reviewed and agreed with or any disagreements were addressed in the HPI. REVIEW OF SYSTEMS :      Review of Systems    Positives and Pertinent negatives as per HPI. SURGICAL HISTORY     Past Surgical History:   Procedure Laterality Date    ARM SURGERY Left 3/2/2020    LEFT ULNAR NERVE DECOMPRESSION AT THE ELBOW performed by Gabrielle Piña MD at [facility] N/A 6/27/2019    EBUS WF W/BECCA.  performed by Heriberto Hensley MD at [place] [address]  6/27/2019    BRONCHOSCOPY/TRANSBRONCHIAL NEEDLE BIOPSY performed by Heriberto Hensley MD at [place] [address]  6/27/2019    BRONCHOSCOPY/TRANSBRONCHIAL LUNG BIOPSY performed by Heriberto Hensley MD at [place] [address]  6/27/2019    BRONCHOSCOPY ALVEOLAR LAVAGE performed by Heribetro Hensley MD at [place] [address]  07/10/2019    BRONCHOSCOPY N/A 7/10/2019    BRONCHOSCOPY ALVEOLAR LAVAGE

## 2023-04-17 NOTE — ED PROVIDER NOTES
12 lead EKG:  Normal Sinus Rhythm 100  Axis is   Normal  QTc is  normal  There is no specific ST-T wave changes appreciated. There is no clear evidence of acute ischemia or infarction. It was compared against an EKG from 4/9/23.         Wilda Foster MD  04/17/23 8965

## 2023-04-18 LAB
ALBUMIN SERPL-MCNC: 3.4 G/DL (ref 3.4–5)
ALBUMIN/GLOB SERPL: 1.8 {RATIO} (ref 1.1–2.2)
ALP SERPL-CCNC: 82 U/L (ref 40–129)
ALT SERPL-CCNC: 17 U/L (ref 10–40)
ANION GAP SERPL CALCULATED.3IONS-SCNC: 7 MMOL/L (ref 3–16)
AST SERPL-CCNC: 19 U/L (ref 15–37)
BILIRUB SERPL-MCNC: 0.3 MG/DL (ref 0–1)
BUN SERPL-MCNC: 14 MG/DL (ref 7–20)
CALCIUM SERPL-MCNC: 8.4 MG/DL (ref 8.3–10.6)
CHLORIDE SERPL-SCNC: 103 MMOL/L (ref 99–110)
CO2 SERPL-SCNC: 32 MMOL/L (ref 21–32)
CREAT SERPL-MCNC: <0.5 MG/DL (ref 0.6–1.2)
EKG ATRIAL RATE: 100 BPM
EKG DIAGNOSIS: NORMAL
EKG P AXIS: 24 DEGREES
EKG P-R INTERVAL: 158 MS
EKG Q-T INTERVAL: 356 MS
EKG QRS DURATION: 82 MS
EKG QTC CALCULATION (BAZETT): 459 MS
EKG R AXIS: 30 DEGREES
EKG T AXIS: 4 DEGREES
EKG VENTRICULAR RATE: 100 BPM
GFR SERPLBLD CREATININE-BSD FMLA CKD-EPI: >60 ML/MIN/{1.73_M2}
GLUCOSE BLD-MCNC: 131 MG/DL (ref 70–99)
GLUCOSE BLD-MCNC: 148 MG/DL (ref 70–99)
GLUCOSE BLD-MCNC: 184 MG/DL (ref 70–99)
GLUCOSE BLD-MCNC: 235 MG/DL (ref 70–99)
GLUCOSE SERPL-MCNC: 179 MG/DL (ref 70–99)
PERFORMED ON: ABNORMAL
POTASSIUM SERPL-SCNC: 4.2 MMOL/L (ref 3.5–5.1)
PROCALCITONIN SERPL IA-MCNC: 0.06 NG/ML (ref 0–0.15)
PROT SERPL-MCNC: 5.3 G/DL (ref 6.4–8.2)
SODIUM SERPL-SCNC: 142 MMOL/L (ref 136–145)

## 2023-04-18 PROCEDURE — 6370000000 HC RX 637 (ALT 250 FOR IP): Performed by: INTERNAL MEDICINE

## 2023-04-18 PROCEDURE — 2060000000 HC ICU INTERMEDIATE R&B

## 2023-04-18 PROCEDURE — 93010 ELECTROCARDIOGRAM REPORT: CPT | Performed by: INTERNAL MEDICINE

## 2023-04-18 PROCEDURE — 36415 COLL VENOUS BLD VENIPUNCTURE: CPT

## 2023-04-18 PROCEDURE — 2700000000 HC OXYGEN THERAPY PER DAY

## 2023-04-18 PROCEDURE — 99223 1ST HOSP IP/OBS HIGH 75: CPT | Performed by: INTERNAL MEDICINE

## 2023-04-18 PROCEDURE — 94640 AIRWAY INHALATION TREATMENT: CPT

## 2023-04-18 PROCEDURE — 84145 PROCALCITONIN (PCT): CPT

## 2023-04-18 PROCEDURE — 6360000002 HC RX W HCPCS: Performed by: INTERNAL MEDICINE

## 2023-04-18 PROCEDURE — 80053 COMPREHEN METABOLIC PANEL: CPT

## 2023-04-18 PROCEDURE — 83036 HEMOGLOBIN GLYCOSYLATED A1C: CPT

## 2023-04-18 PROCEDURE — 2580000003 HC RX 258: Performed by: INTERNAL MEDICINE

## 2023-04-18 PROCEDURE — 94761 N-INVAS EAR/PLS OXIMETRY MLT: CPT

## 2023-04-18 RX ORDER — INSULIN LISPRO 100 [IU]/ML
0-4 INJECTION, SOLUTION INTRAVENOUS; SUBCUTANEOUS EVERY 4 HOURS
Status: DISCONTINUED | OUTPATIENT
Start: 2023-04-18 | End: 2023-04-18

## 2023-04-18 RX ORDER — SODIUM CHLORIDE 0.9 % (FLUSH) 0.9 %
10 SYRINGE (ML) INJECTION PRN
Status: DISCONTINUED | OUTPATIENT
Start: 2023-04-18 | End: 2023-04-21 | Stop reason: HOSPADM

## 2023-04-18 RX ORDER — PANTOPRAZOLE SODIUM 40 MG/1
40 TABLET, DELAYED RELEASE ORAL
Status: DISCONTINUED | OUTPATIENT
Start: 2023-04-18 | End: 2023-04-21 | Stop reason: HOSPADM

## 2023-04-18 RX ORDER — MULTIVIT WITH CALCIUM,IRON,MIN 27MG-0.4MG
1 TABLET ORAL DAILY
Status: DISCONTINUED | OUTPATIENT
Start: 2023-04-18 | End: 2023-04-18

## 2023-04-18 RX ORDER — ACETAMINOPHEN 650 MG/1
650 SUPPOSITORY RECTAL EVERY 6 HOURS PRN
Status: DISCONTINUED | OUTPATIENT
Start: 2023-04-18 | End: 2023-04-21 | Stop reason: HOSPADM

## 2023-04-18 RX ORDER — GUAIFENESIN 600 MG/1
600 TABLET, EXTENDED RELEASE ORAL PRN
Status: DISCONTINUED | OUTPATIENT
Start: 2023-04-18 | End: 2023-04-21 | Stop reason: HOSPADM

## 2023-04-18 RX ORDER — ONDANSETRON 2 MG/ML
4 INJECTION INTRAMUSCULAR; INTRAVENOUS EVERY 6 HOURS PRN
Status: DISCONTINUED | OUTPATIENT
Start: 2023-04-18 | End: 2023-04-21 | Stop reason: HOSPADM

## 2023-04-18 RX ORDER — PREDNISONE 20 MG/1
40 TABLET ORAL DAILY
Status: DISCONTINUED | OUTPATIENT
Start: 2023-04-20 | End: 2023-04-21 | Stop reason: HOSPADM

## 2023-04-18 RX ORDER — DEXTROSE MONOHYDRATE 100 MG/ML
INJECTION, SOLUTION INTRAVENOUS CONTINUOUS PRN
Status: DISCONTINUED | OUTPATIENT
Start: 2023-04-18 | End: 2023-04-21 | Stop reason: HOSPADM

## 2023-04-18 RX ORDER — BENZONATATE 100 MG/1
100 CAPSULE ORAL EVERY 4 HOURS PRN
Status: DISCONTINUED | OUTPATIENT
Start: 2023-04-18 | End: 2023-04-21 | Stop reason: HOSPADM

## 2023-04-18 RX ORDER — METHOCARBAMOL 750 MG/1
750 TABLET, FILM COATED ORAL 3 TIMES DAILY PRN
COMMUNITY

## 2023-04-18 RX ORDER — INSULIN LISPRO 100 [IU]/ML
0-4 INJECTION, SOLUTION INTRAVENOUS; SUBCUTANEOUS NIGHTLY
Status: DISCONTINUED | OUTPATIENT
Start: 2023-04-18 | End: 2023-04-21 | Stop reason: HOSPADM

## 2023-04-18 RX ORDER — SODIUM CHLORIDE 9 MG/ML
INJECTION, SOLUTION INTRAVENOUS PRN
Status: DISCONTINUED | OUTPATIENT
Start: 2023-04-18 | End: 2023-04-21 | Stop reason: HOSPADM

## 2023-04-18 RX ORDER — ATORVASTATIN CALCIUM 40 MG/1
40 TABLET, FILM COATED ORAL DAILY
Status: DISCONTINUED | OUTPATIENT
Start: 2023-04-18 | End: 2023-04-21 | Stop reason: HOSPADM

## 2023-04-18 RX ORDER — ALBUTEROL SULFATE 2.5 MG/3ML
2.5 SOLUTION RESPIRATORY (INHALATION)
Status: DISCONTINUED | OUTPATIENT
Start: 2023-04-18 | End: 2023-04-21 | Stop reason: HOSPADM

## 2023-04-18 RX ORDER — PIRFENIDONE 267 MG/1
1 TABLET, FILM COATED ORAL 3 TIMES DAILY
Status: DISCONTINUED | OUTPATIENT
Start: 2023-04-18 | End: 2023-04-19 | Stop reason: RX

## 2023-04-18 RX ORDER — LORAZEPAM 0.5 MG/1
0.5 TABLET ORAL
Status: DISCONTINUED | OUTPATIENT
Start: 2023-04-18 | End: 2023-04-21 | Stop reason: HOSPADM

## 2023-04-18 RX ORDER — SODIUM CHLORIDE 9 MG/ML
INJECTION, SOLUTION INTRAVENOUS CONTINUOUS
Status: ACTIVE | OUTPATIENT
Start: 2023-04-18 | End: 2023-04-18

## 2023-04-18 RX ORDER — SODIUM CHLORIDE 0.9 % (FLUSH) 0.9 %
10 SYRINGE (ML) INJECTION EVERY 12 HOURS SCHEDULED
Status: DISCONTINUED | OUTPATIENT
Start: 2023-04-18 | End: 2023-04-21 | Stop reason: HOSPADM

## 2023-04-18 RX ORDER — INSULIN LISPRO 100 [IU]/ML
0-4 INJECTION, SOLUTION INTRAVENOUS; SUBCUTANEOUS
Status: DISCONTINUED | OUTPATIENT
Start: 2023-04-18 | End: 2023-04-21 | Stop reason: HOSPADM

## 2023-04-18 RX ORDER — ACETAMINOPHEN 325 MG/1
650 TABLET ORAL EVERY 6 HOURS PRN
Status: DISCONTINUED | OUTPATIENT
Start: 2023-04-18 | End: 2023-04-21 | Stop reason: HOSPADM

## 2023-04-18 RX ORDER — METHYLPREDNISOLONE SODIUM SUCCINATE 40 MG/ML
40 INJECTION, POWDER, LYOPHILIZED, FOR SOLUTION INTRAMUSCULAR; INTRAVENOUS EVERY 12 HOURS
Status: COMPLETED | OUTPATIENT
Start: 2023-04-18 | End: 2023-04-19

## 2023-04-18 RX ORDER — SERTRALINE HYDROCHLORIDE 100 MG/1
200 TABLET, FILM COATED ORAL DAILY
Status: DISCONTINUED | OUTPATIENT
Start: 2023-04-18 | End: 2023-04-21 | Stop reason: HOSPADM

## 2023-04-18 RX ORDER — LEVOTHYROXINE SODIUM 0.12 MG/1
125 TABLET ORAL DAILY
Status: DISCONTINUED | OUTPATIENT
Start: 2023-04-18 | End: 2023-04-21 | Stop reason: HOSPADM

## 2023-04-18 RX ORDER — ONDANSETRON 4 MG/1
4 TABLET, ORALLY DISINTEGRATING ORAL EVERY 8 HOURS PRN
Status: DISCONTINUED | OUTPATIENT
Start: 2023-04-18 | End: 2023-04-21 | Stop reason: HOSPADM

## 2023-04-18 RX ORDER — DILTIAZEM HYDROCHLORIDE 240 MG/1
240 CAPSULE, COATED, EXTENDED RELEASE ORAL DAILY
Status: DISCONTINUED | OUTPATIENT
Start: 2023-04-18 | End: 2023-04-21 | Stop reason: HOSPADM

## 2023-04-18 RX ORDER — MIRTAZAPINE 30 MG/1
30 TABLET, FILM COATED ORAL NIGHTLY
Status: DISCONTINUED | OUTPATIENT
Start: 2023-04-18 | End: 2023-04-21 | Stop reason: HOSPADM

## 2023-04-18 RX ORDER — IPRATROPIUM BROMIDE AND ALBUTEROL SULFATE 2.5; .5 MG/3ML; MG/3ML
1 SOLUTION RESPIRATORY (INHALATION)
Status: DISCONTINUED | OUTPATIENT
Start: 2023-04-18 | End: 2023-04-19

## 2023-04-18 RX ORDER — PANTOPRAZOLE SODIUM 40 MG/1
40 TABLET, DELAYED RELEASE ORAL DAILY
COMMUNITY

## 2023-04-18 RX ORDER — HYDROXYZINE HYDROCHLORIDE 25 MG/1
25 TABLET, FILM COATED ORAL 3 TIMES DAILY PRN
Status: DISCONTINUED | OUTPATIENT
Start: 2023-04-18 | End: 2023-04-21 | Stop reason: HOSPADM

## 2023-04-18 RX ORDER — POLYETHYLENE GLYCOL 3350 17 G/17G
17 POWDER, FOR SOLUTION ORAL DAILY PRN
Status: DISCONTINUED | OUTPATIENT
Start: 2023-04-18 | End: 2023-04-21 | Stop reason: HOSPADM

## 2023-04-18 RX ORDER — NADOLOL 40 MG/1
20 TABLET ORAL DAILY
Status: DISCONTINUED | OUTPATIENT
Start: 2023-04-18 | End: 2023-04-21 | Stop reason: HOSPADM

## 2023-04-18 RX ORDER — TRAMADOL HYDROCHLORIDE 50 MG/1
50 TABLET ORAL EVERY 6 HOURS PRN
Status: DISCONTINUED | OUTPATIENT
Start: 2023-04-18 | End: 2023-04-21 | Stop reason: HOSPADM

## 2023-04-18 RX ORDER — FUROSEMIDE 40 MG/1
40 TABLET ORAL PRN
COMMUNITY

## 2023-04-18 RX ORDER — ENOXAPARIN SODIUM 100 MG/ML
40 INJECTION SUBCUTANEOUS DAILY
Status: DISCONTINUED | OUTPATIENT
Start: 2023-04-18 | End: 2023-04-19

## 2023-04-18 RX ADMIN — ENOXAPARIN SODIUM 40 MG: 100 INJECTION SUBCUTANEOUS at 09:21

## 2023-04-18 RX ADMIN — INSULIN LISPRO 1 UNITS: 100 INJECTION, SOLUTION INTRAVENOUS; SUBCUTANEOUS at 16:48

## 2023-04-18 RX ADMIN — IPRATROPIUM BROMIDE AND ALBUTEROL SULFATE 1 AMPULE: 2.5; .5 SOLUTION RESPIRATORY (INHALATION) at 20:00

## 2023-04-18 RX ADMIN — AZITHROMYCIN DIHYDRATE 500 MG: 500 INJECTION, POWDER, LYOPHILIZED, FOR SOLUTION INTRAVENOUS at 09:08

## 2023-04-18 RX ADMIN — IPRATROPIUM BROMIDE AND ALBUTEROL SULFATE 1 AMPULE: 2.5; .5 SOLUTION RESPIRATORY (INHALATION) at 22:58

## 2023-04-18 RX ADMIN — SALINE NASAL SPRAY 1 SPRAY: 1.5 SOLUTION NASAL at 19:41

## 2023-04-18 RX ADMIN — LORAZEPAM 0.5 MG: 0.5 TABLET ORAL at 16:51

## 2023-04-18 RX ADMIN — IPRATROPIUM BROMIDE AND ALBUTEROL SULFATE 1 AMPULE: 2.5; .5 SOLUTION RESPIRATORY (INHALATION) at 11:44

## 2023-04-18 RX ADMIN — SODIUM CHLORIDE: 9 INJECTION, SOLUTION INTRAVENOUS at 06:44

## 2023-04-18 RX ADMIN — Medication 10 ML: at 19:42

## 2023-04-18 RX ADMIN — METHYLPREDNISOLONE SODIUM SUCCINATE 40 MG: 40 INJECTION, POWDER, FOR SOLUTION INTRAMUSCULAR; INTRAVENOUS at 09:11

## 2023-04-18 RX ADMIN — ACETAMINOPHEN 650 MG: 325 TABLET ORAL at 14:37

## 2023-04-18 RX ADMIN — IPRATROPIUM BROMIDE AND ALBUTEROL SULFATE 1 AMPULE: 2.5; .5 SOLUTION RESPIRATORY (INHALATION) at 07:53

## 2023-04-18 RX ADMIN — METHYLPREDNISOLONE SODIUM SUCCINATE 40 MG: 40 INJECTION, POWDER, FOR SOLUTION INTRAMUSCULAR; INTRAVENOUS at 19:40

## 2023-04-18 RX ADMIN — IPRATROPIUM BROMIDE AND ALBUTEROL SULFATE 1 AMPULE: 2.5; .5 SOLUTION RESPIRATORY (INHALATION) at 15:39

## 2023-04-18 ASSESSMENT — PAIN DESCRIPTION - LOCATION: LOCATION: HEAD

## 2023-04-18 ASSESSMENT — PAIN DESCRIPTION - DESCRIPTORS: DESCRIPTORS: ACHING

## 2023-04-18 ASSESSMENT — PAIN SCALES - GENERAL: PAINLEVEL_OUTOF10: 3

## 2023-04-18 NOTE — FLOWSHEET NOTE
04/18/23 1643   Vital Signs   Temp 97.7 °F (36.5 °C)   Temp Source Oral   Heart Rate (!) 104   Resp 22   /63   MAP (Calculated) 82   BP Location Right upper arm   Level of Consciousness 0   MEWS Score 3   Oxygen Therapy   SpO2 99 %   O2 Device High flow nasal cannula   O2 Flow Rate (L/min) 9 L/min     Reassessment complete, see flow sheet. Denies needs. Call light in reach. Will monitor.

## 2023-04-18 NOTE — FLOWSHEET NOTE
04/18/23 1204   Vital Signs   Temp 98.2 °F (36.8 °C)   Temp Source Oral   Heart Rate 85   Resp 20   BP (!) 102/59   MAP (Calculated) 73   BP Location Right upper arm   Level of Consciousness 0   MEWS Score 1   Oxygen Therapy   SpO2 100 %   O2 Device High flow nasal cannula   O2 Flow Rate (L/min) 8 L/min     Patient resting quietly in bed. Reassessment complete, no changes. Asking for water. Spoke with javier Velasquez for patient to have diet. Will monitor.

## 2023-04-18 NOTE — CONSULTS
pirfenidone 1 tab tid to increase gradually as Op ,don\  Transplant clinic as OP   DVT px   If not better ,bronch on Monday and check PCP however she is very high risk ,will discuss with family   Check LDH         Thank you very much for allowing me to participate in the care of this pleasant patient , should you have any questions ,please do not hesitate to contact me      Louann Davis MD,[facility]  Pulmonary&Critical Care Medicine    Melanie Wood    NOTE: This report was transcribed using voice recognition software. Every effort was made to ensure accuracy; however, inadvertent computerized transcription errors may be present.

## 2023-04-18 NOTE — H&P
Hospital Medicine History & Physical      PCP: Jory Hensley MD    Date of Service: Pt seen/examined on 4/18/23 and admitted on 4/18/23 to Inpatient    Chief Complaint   Patient presents with    Shortness of Breath     Pt states that she started feeling SOB yesterday. Pt usually wears 6-8L at home. Pt currently on 10 at this time. History Of Present Illness: The patient is a 77 y.o. female with PMH below, presents with SOB/LEE, anxiety, productive cough, increased O2 demand. Pt presented to [place] [address] w/ the above complaints, all worsening over the last several days. She has had multiple admissions and also recent ED visit for the same. She is normally on 3-6 L O2 at rest and 8 L w/ exertion. Her O2 requirement was very labile at [place] [address]. I have concerns about how her O2 is being used at home. It was described to me that family increases her O2 in response to anxiety, not necessarily 2/2 her O2 sats. She does have severe anxiety that has been known to worsen her resp status. Also, please the the copied note below written by Rogelio Tilley ED RN about what her  described as \"hot shotting\" her O2 in efforts to conserve her O2:    Per patient  patient is only allowed 6 oxygen tanks at a time and he is doing what he calls a \"hot shot\" at home which he described to RN as him hyperoxygenating her and then taking her off of oxygen or turning it way down for a period of time in attempt to conserve oxygen. He states that he \"starts to worry when her oxygen level is below 40%. \" He also endorses waking up many times through the night and patient's tanks have run out of oxygen. He states that weekends are difficult for them.      Bettye Belcher RN  04/17/23 4615    Past Medical History:        Diagnosis Date    A-fib ([address]     Anxiety     COPD (chronic obstructive pulmonary disease) ([facility]     Cryptogenic organizing pneumonia ([facility]     Depression     GERD (gastroesophageal reflux disease)

## 2023-04-18 NOTE — FLOWSHEET NOTE
04/18/23 1930   Vital Signs   Temp 98.3 °F (36.8 °C)   Temp Source Oral   Heart Rate (!) 109   Heart Rate Source Monitor   Resp 24   BP (!) 101/55   MAP (Calculated) 70   BP Location Right upper arm   BP Method Automatic   Patient Position Lying left side   Level of Consciousness 0   MEWS Score 3   Pain Assessment   Pain Assessment None - Denies Pain   Oxygen Therapy   SpO2 99 %   O2 Device High flow nasal cannula   O2 Flow Rate (L/min) 8 L/min     Pt awake and alert in bed. Vitals obtained. Oriented times 4. Shift assessment completed, see flow sheet. Pt denies any pain at this time. Scheduled meds given, see MAR. /55, scheduled Remeron not given d/t patient refusal.  Pt denies any further needs at this time. Call light in reach.

## 2023-04-18 NOTE — CARE COORDINATION
Case Management Assessment  Initial Evaluation    Date/Time of Evaluation: 4/18/2023 4:12 PM  Assessment Completed by: Kaiser Bullard RN    If patient is discharged prior to next notation, then this note serves as note for discharge by case management. Patient Name: Karyle Loyal Heim-Shelton                   YOB: 1955  Diagnosis: Pulmonary fibrosis ([facility] [J84.10]  COPD exacerbation ([facility] [J44.1]  Acute on chronic respiratory failure with hypoxia and hypercapnia ([facility] [S34.80, J96.22]                   Date / Time: 4/17/2023  6:23 PM    Patient Admission Status: Inpatient   Readmission Risk (Low < 19, Mod (19-27), High > 27): Readmission Risk Score: 40    Current PCP: Maida Nelson MD  PCP verified by CM? (P) Yes (Tele-Health)    Chart Reviewed: Yes      History Provided by: (P) Patient  Patient Orientation: (P) Alert and Oriented, Person, Place, Situation    Patient Cognition: (P) Alert    Hospitalization in the last 30 days (Readmission):  Yes    If yes, Readmission Assessment in CM Navigator will be completed.     Advance Directives:      Code Status: Full Code   Patient's Primary Decision Maker is: (P) Named in Scanned ACP Document    Primary Decision Maker: Wei Germain - Spouse - [phone]    Secondary Decision Maker: Kylah - [phone]    Discharge Planning:    Patient lives with: (P) Spouse/Significant Other Type of Home: (P) House  Primary Care Giver: (P) Family  Patient Support Systems include: (P) Spouse/Significant Other, Home Care Staff, VALENTINO/Passport   Current Financial resources: (P) Medicaid, Medicare  Current community resources: (P) ECF/Home Care  Current services prior to admission: (P) Durable Medical Equipment            Current DME: (P) Oxygen Therapy (Comment), Home Aerosol, Walker            Type of Home Care services:  (P) Nursing Services    ADLS  Prior functional level: (P) Assistance with the following:, Bathing, Dressing, Cooking, Housework,

## 2023-04-18 NOTE — ACP (ADVANCE CARE PLANNING)
Advance Care Planning     General Advance Care Planning (ACP) Conversation    Date of Conversation: 4/17/2023  Conducted with: Patient with Decision Making Capacity    Healthcare Decision Maker:    Primary Decision Maker: Ana M Esteban - Spouse - [phone]    Secondary Decision Maker: Terrence Mdoi - Child - [phone]  Click here to complete Healthcare Decision Makers including selection of the Healthcare Decision Maker Relationship (ie \"Primary\"). Today we documented Decision Maker(s) consistent with ACP documents on file. Content/Action Overview:   Has ACP document(s) on file - reflects the patient's care preferences  Reviewed DNR/DNI and patient elects Full Code (Attempt Resuscitation)    Length of Voluntary ACP Conversation in minutes:  <16 minutes (Non-Billable)    Lolly Leyden, RN

## 2023-04-18 NOTE — ED PROVIDER NOTES
I independently examined and evaluated Armin Obando. I personally saw the patient and performed a substantive portion of the visit including all aspects of the medical decision making. In brief, this 70-year-old female with history COPD is presenting with worsening shortness of breath. Patient has had 2 previous ED visits for the same complaint of the last 11 days. She has recently been on steroids. States that despite her home 6 to 8 L oxamide nasal cannula she is having worsening shortness of breath, cough productive of brown/green sputum denies any fevers or chills, chest pain. Focused exam revealed   General: Alert, no acute distress, patient resting comfortably   Skin: warm, intact, no pallor noted   Head: Normocephalic, atraumatic   Eye: Normal conjunctiva   Cardiac: Normal peripheral perfusion  Respiratory: No acute distress on 10 L high flow nasal cannula after breathing treatment  Musculoskeletal: No deformity, full ROM. Neurological: alert and oriented, normal sensory and motor observed. Psychiatric: Cooperative    ED course: Lab obtained and does show a second respiratory doses. She received breathing treatment antifungal cannula and is now weightbearing comfortably with 11 just lung sounds. Will monitor closely and repeat ABG to assess for need for BiPAP. At this is the patient's third visit in less than 2 weeks, I do feel that she is failing outpatient therapy, required mission which he is agreement with. Septic work-up was obtained. Given Solu-Medrol and azithromycin for COPD exacerbation. Repeat EKG showed significant improved hypercapnia. Patient admitted to hospital service for continued treatment and evaluation. ECG    The Ekg interpreted by me shows sinus rhythm rate 100 bpm.  Normal axis. Diffuse T wave inversions. No elevations or depressions. , QRS 82, QTc 459.     All diagnostic, treatment, and disposition decisions were made by myself in conjunction

## 2023-04-18 NOTE — FLOWSHEET NOTE
04/18/23 0915   Vital Signs   Temp 98.2 °F (36.8 °C)   Temp Source Oral   Heart Rate 80   Resp 22   BP (!) 102/57   MAP (Calculated) 72   BP Location Right upper arm   Level of Consciousness 0   MEWS Score 2   Oxygen Therapy   SpO2 100 %   O2 Device High flow nasal cannula   O2 Flow Rate (L/min) 6 L/min     Patient resting queitly in bed. No s/s of distress noted. Shift assessment complete, see flow sheet. Reports shortness of breath, SpO2 100% on 6L HFNC. NPO at this time. Patient refused AM medications, states she is too short of breath to take her medication. Call light in reach. Will monitor.

## 2023-04-19 LAB
BASOPHILS # BLD: 0 K/UL (ref 0–0.2)
BASOPHILS NFR BLD: 0.1 %
DEPRECATED RDW RBC AUTO: 16.2 % (ref 12.4–15.4)
EOSINOPHIL # BLD: 0 K/UL (ref 0–0.6)
EOSINOPHIL NFR BLD: 0 %
EST. AVERAGE GLUCOSE BLD GHB EST-MCNC: 114 MG/DL
GLUCOSE BLD-MCNC: 120 MG/DL (ref 70–99)
GLUCOSE BLD-MCNC: 133 MG/DL (ref 70–99)
GLUCOSE BLD-MCNC: 149 MG/DL (ref 70–99)
GLUCOSE BLD-MCNC: 201 MG/DL (ref 70–99)
HBA1C MFR BLD: 5.6 %
HCT VFR BLD AUTO: 23.1 % (ref 36–48)
HCT VFR BLD AUTO: 26.7 % (ref 36–48)
HGB BLD-MCNC: 7.2 G/DL (ref 12–16)
HGB BLD-MCNC: 8.3 G/DL (ref 12–16)
LDH SERPL L TO P-CCNC: 188 U/L (ref 100–190)
LYMPHOCYTES # BLD: 0.4 K/UL (ref 1–5.1)
LYMPHOCYTES NFR BLD: 8.2 %
MCH RBC QN AUTO: 24.4 PG (ref 26–34)
MCHC RBC AUTO-ENTMCNC: 31.3 G/DL (ref 31–36)
MCV RBC AUTO: 77.9 FL (ref 80–100)
MONOCYTES # BLD: 0.3 K/UL (ref 0–1.3)
MONOCYTES NFR BLD: 5.3 %
NEUTROPHILS # BLD: 4.3 K/UL (ref 1.7–7.7)
NEUTROPHILS NFR BLD: 86.4 %
PERFORMED ON: ABNORMAL
PLATELET # BLD AUTO: 130 K/UL (ref 135–450)
PMV BLD AUTO: 7.9 FL (ref 5–10.5)
RBC # BLD AUTO: 2.97 M/UL (ref 4–5.2)
WBC # BLD AUTO: 5 K/UL (ref 4–11)

## 2023-04-19 PROCEDURE — 2580000003 HC RX 258: Performed by: INTERNAL MEDICINE

## 2023-04-19 PROCEDURE — 6370000000 HC RX 637 (ALT 250 FOR IP): Performed by: INTERNAL MEDICINE

## 2023-04-19 PROCEDURE — 36415 COLL VENOUS BLD VENIPUNCTURE: CPT

## 2023-04-19 PROCEDURE — 94640 AIRWAY INHALATION TREATMENT: CPT

## 2023-04-19 PROCEDURE — 99232 SBSQ HOSP IP/OBS MODERATE 35: CPT | Performed by: INTERNAL MEDICINE

## 2023-04-19 PROCEDURE — 2060000000 HC ICU INTERMEDIATE R&B

## 2023-04-19 PROCEDURE — 85018 HEMOGLOBIN: CPT

## 2023-04-19 PROCEDURE — 83615 LACTATE (LD) (LDH) ENZYME: CPT

## 2023-04-19 PROCEDURE — 85025 COMPLETE CBC W/AUTO DIFF WBC: CPT

## 2023-04-19 PROCEDURE — 2700000000 HC OXYGEN THERAPY PER DAY

## 2023-04-19 PROCEDURE — 94761 N-INVAS EAR/PLS OXIMETRY MLT: CPT

## 2023-04-19 PROCEDURE — 6360000002 HC RX W HCPCS: Performed by: INTERNAL MEDICINE

## 2023-04-19 PROCEDURE — 85014 HEMATOCRIT: CPT

## 2023-04-19 RX ORDER — IPRATROPIUM BROMIDE AND ALBUTEROL SULFATE 2.5; .5 MG/3ML; MG/3ML
1 SOLUTION RESPIRATORY (INHALATION) 3 TIMES DAILY
Status: DISCONTINUED | OUTPATIENT
Start: 2023-04-19 | End: 2023-04-21 | Stop reason: HOSPADM

## 2023-04-19 RX ORDER — SODIUM CHLORIDE 9 MG/ML
INJECTION, SOLUTION INTRAVENOUS PRN
Status: DISCONTINUED | OUTPATIENT
Start: 2023-04-19 | End: 2023-04-19

## 2023-04-19 RX ORDER — PIRFENIDONE 801 MG/1
267 TABLET, FILM COATED ORAL 2 TIMES DAILY
Status: DISCONTINUED | OUTPATIENT
Start: 2023-04-19 | End: 2023-04-21 | Stop reason: HOSPADM

## 2023-04-19 RX ORDER — ENOXAPARIN SODIUM 100 MG/ML
30 INJECTION SUBCUTANEOUS DAILY
Status: DISCONTINUED | OUTPATIENT
Start: 2023-04-20 | End: 2023-04-21 | Stop reason: HOSPADM

## 2023-04-19 RX ADMIN — Medication 10 ML: at 20:23

## 2023-04-19 RX ADMIN — METHYLPREDNISOLONE SODIUM SUCCINATE 40 MG: 40 INJECTION, POWDER, FOR SOLUTION INTRAMUSCULAR; INTRAVENOUS at 08:46

## 2023-04-19 RX ADMIN — SALINE NASAL SPRAY 1 SPRAY: 1.5 SOLUTION NASAL at 20:22

## 2023-04-19 RX ADMIN — DILTIAZEM HYDROCHLORIDE 240 MG: 240 CAPSULE, COATED, EXTENDED RELEASE ORAL at 08:47

## 2023-04-19 RX ADMIN — MIRTAZAPINE 30 MG: 30 TABLET, FILM COATED ORAL at 20:22

## 2023-04-19 RX ADMIN — Medication 10 ML: at 08:46

## 2023-04-19 RX ADMIN — IPRATROPIUM BROMIDE AND ALBUTEROL SULFATE 1 AMPULE: 2.5; .5 SOLUTION RESPIRATORY (INHALATION) at 07:32

## 2023-04-19 RX ADMIN — NADOLOL 20 MG: 40 TABLET ORAL at 08:47

## 2023-04-19 RX ADMIN — METHYLPREDNISOLONE SODIUM SUCCINATE 40 MG: 40 INJECTION, POWDER, FOR SOLUTION INTRAMUSCULAR; INTRAVENOUS at 20:22

## 2023-04-19 RX ADMIN — ENOXAPARIN SODIUM 40 MG: 100 INJECTION SUBCUTANEOUS at 08:46

## 2023-04-19 RX ADMIN — ATORVASTATIN CALCIUM 40 MG: 40 TABLET, FILM COATED ORAL at 08:47

## 2023-04-19 RX ADMIN — LORAZEPAM 0.5 MG: 0.5 TABLET ORAL at 02:52

## 2023-04-19 RX ADMIN — IPRATROPIUM BROMIDE AND ALBUTEROL SULFATE 1 AMPULE: 2.5; .5 SOLUTION RESPIRATORY (INHALATION) at 20:43

## 2023-04-19 RX ADMIN — PIRFENIDONE 267 MG: 801 TABLET, FILM COATED ORAL at 12:09

## 2023-04-19 RX ADMIN — PIRFENIDONE 267 MG: 801 TABLET, FILM COATED ORAL at 20:22

## 2023-04-19 RX ADMIN — INSULIN LISPRO 1 UNITS: 100 INJECTION, SOLUTION INTRAVENOUS; SUBCUTANEOUS at 16:28

## 2023-04-19 RX ADMIN — LEVOTHYROXINE SODIUM 125 MCG: 125 TABLET ORAL at 06:09

## 2023-04-19 RX ADMIN — AZITHROMYCIN DIHYDRATE 500 MG: 500 INJECTION, POWDER, LYOPHILIZED, FOR SOLUTION INTRAVENOUS at 08:55

## 2023-04-19 RX ADMIN — SALINE NASAL SPRAY 1 SPRAY: 1.5 SOLUTION NASAL at 08:46

## 2023-04-19 RX ADMIN — PANTOPRAZOLE SODIUM 40 MG: 40 TABLET, DELAYED RELEASE ORAL at 06:09

## 2023-04-19 RX ADMIN — IPRATROPIUM BROMIDE AND ALBUTEROL SULFATE 1 AMPULE: 2.5; .5 SOLUTION RESPIRATORY (INHALATION) at 10:52

## 2023-04-19 RX ADMIN — SERTRALINE 200 MG: 100 TABLET, FILM COATED ORAL at 08:47

## 2023-04-19 ASSESSMENT — PAIN SCALES - GENERAL: PAINLEVEL_OUTOF10: 0

## 2023-04-19 NOTE — FLOWSHEET NOTE
04/19/23 1625   Vital Signs   Temp 97.7 °F (36.5 °C)   Temp Source Axillary   Heart Rate 85   Resp 20   BP (!) 104/59   MAP (Calculated) 74   BP Location Right upper arm   Level of Consciousness 0   MEWS Score 1   Oxygen Therapy   SpO2 100 %   O2 Device Non-rebreather mask   O2 Flow Rate (L/min) 15 L/min     Patient continues to rest quietly in bed on left side. Continues with 5L HFNC and NRB. SpO2 100%.

## 2023-04-19 NOTE — CARE COORDINATION
Palliative Care Consult  Palliative care philosophy explained:      Palliative care is an approach that improves the quality of life of patients and their families facing the problem associated with life-threatening illness, through the prevention and relief of suffering. The goal being to keep pt in their home, identify and treat problems before they become emergencies. Diagnosis: ILD    Current Code Status: Full    Patients Goals of Care: Pt/family await return call from [address] [place] [address] for potential ling transplant.      Pastoral Consult: declined

## 2023-04-19 NOTE — FLOWSHEET NOTE
04/19/23 0843   Vital Signs   Temp (!) 96.7 °F (35.9 °C)   Temp Source Axillary   Heart Rate (!) 101   Resp 24   /72   MAP (Calculated) 89   BP Location Right upper arm   Level of Consciousness 0   MEWS Score 3   Oxygen Therapy   SpO2 100 %   O2 Device High flow nasal cannula   O2 Flow Rate (L/min) 5 L/min     Patient resting in bed quietly laying on left side. No s/s of distress noted. Shift assessment complete, see flow sheet. Currently on 5L HFNC, however patient does continue to use NRB for desaturations when needed. Denies needs. Call light in reach. Will monitor.

## 2023-04-19 NOTE — FLOWSHEET NOTE
04/19/23 0245   Vital Signs   Temp 98 °F (36.7 °C)   Temp Source Oral   Heart Rate (!) 106   Heart Rate Source Monitor   Resp 22   /67   MAP (Calculated) 83   BP Location Right upper arm   BP Method Automatic   Patient Position Semi fowlers   Level of Consciousness 0   MEWS Score 3   Oxygen Therapy   SpO2 98 %   O2 Flow Rate (L/min) 7 L/min     PRN Ativan given for anxiety.

## 2023-04-19 NOTE — CARE COORDINATION
INTERDISCIPLINARY PLAN OF CARE CONFERENCE    Date/Time: 4/19/2023 3:33 PM  Completed by: John Sheikh RN, Case Management      Patient Name:  Sadia Obando  YOB: 1955  Admitting Diagnosis: Pulmonary fibrosis ([facility] [J84.10]  COPD exacerbation ([facility] [address] [facility] [J44.1]  Acute on chronic respiratory failure with hypoxia and hypercapnia ([facility] [O24.23, J96.22]     Admit Date/Time:  4/17/2023  6:23 PM    Chart reviewed. Interdisciplinary team contacted or reviewed plan related to patient progress and discharge plans. Disciplines included Case Management, Nursing, and Dietitian. Current Status: IP 04/17/2023  PT/OT recommendation for discharge plan of care: NA    Expected D/C Disposition:  Home    Discharge Plan Comments: Chart reviewed. Met with patient and explained the role of the CM. Goal is to return home. Ongoing issues with HHA coverage at home. Spouse and daughter do provide coverage but continue to request additional support through [facility]. Call placed to Syl JAQUEZ CM to discuss family request to make pt a priority patient for evening hours with [facility]. Private duty [address] list was provided , however pt/family cannot afford private duty. Pt is not interested in long-term placement, home hospice or palliative care and is seeking consultation at [address] [place] [address] for possible lung transplant. Pt is eligible for skilled HHC (SN and PT/OT) if she needs it upon DC. Active w. [facility] for home SN and wishes to resume. +CM following    Home O2 in place on admit: Yes (6-8 liters - [place])   Pt informed of need to bring portable home O2 tank on day of discharge for nursing to connect prior to leaving:  Yes  Verbalized agreement/Understanding:   Yes

## 2023-04-20 ENCOUNTER — APPOINTMENT (OUTPATIENT)
Dept: GENERAL RADIOLOGY | Age: 68
End: 2023-04-20
Payer: MEDICARE

## 2023-04-20 LAB
EKG ATRIAL RATE: 357 BPM
EKG DIAGNOSIS: NORMAL
EKG Q-T INTERVAL: 298 MS
EKG QRS DURATION: 72 MS
EKG QTC CALCULATION (BAZETT): 438 MS
EKG R AXIS: 10 DEGREES
EKG T AXIS: -34 DEGREES
EKG VENTRICULAR RATE: 130 BPM
GLUCOSE BLD-MCNC: 174 MG/DL (ref 70–99)
GLUCOSE BLD-MCNC: 215 MG/DL (ref 70–99)
GLUCOSE BLD-MCNC: 253 MG/DL (ref 70–99)
GLUCOSE BLD-MCNC: 86 MG/DL (ref 70–99)
HCT VFR BLD AUTO: 29 % (ref 36–48)
HGB BLD-MCNC: 8.9 G/DL (ref 12–16)
PERFORMED ON: ABNORMAL
PERFORMED ON: NORMAL

## 2023-04-20 PROCEDURE — 2060000000 HC ICU INTERMEDIATE R&B

## 2023-04-20 PROCEDURE — 36415 COLL VENOUS BLD VENIPUNCTURE: CPT

## 2023-04-20 PROCEDURE — 85014 HEMATOCRIT: CPT

## 2023-04-20 PROCEDURE — 71045 X-RAY EXAM CHEST 1 VIEW: CPT

## 2023-04-20 PROCEDURE — 6360000002 HC RX W HCPCS: Performed by: INTERNAL MEDICINE

## 2023-04-20 PROCEDURE — 94761 N-INVAS EAR/PLS OXIMETRY MLT: CPT

## 2023-04-20 PROCEDURE — 93010 ELECTROCARDIOGRAM REPORT: CPT | Performed by: INTERNAL MEDICINE

## 2023-04-20 PROCEDURE — 99233 SBSQ HOSP IP/OBS HIGH 50: CPT | Performed by: INTERNAL MEDICINE

## 2023-04-20 PROCEDURE — 6370000000 HC RX 637 (ALT 250 FOR IP): Performed by: INTERNAL MEDICINE

## 2023-04-20 PROCEDURE — 2580000003 HC RX 258: Performed by: INTERNAL MEDICINE

## 2023-04-20 PROCEDURE — 93005 ELECTROCARDIOGRAM TRACING: CPT | Performed by: INTERNAL MEDICINE

## 2023-04-20 PROCEDURE — 85018 HEMOGLOBIN: CPT

## 2023-04-20 PROCEDURE — 99232 SBSQ HOSP IP/OBS MODERATE 35: CPT | Performed by: INTERNAL MEDICINE

## 2023-04-20 PROCEDURE — 94640 AIRWAY INHALATION TREATMENT: CPT

## 2023-04-20 PROCEDURE — 2700000000 HC OXYGEN THERAPY PER DAY

## 2023-04-20 RX ORDER — AZITHROMYCIN 250 MG/1
500 TABLET, FILM COATED ORAL ONCE
Status: COMPLETED | OUTPATIENT
Start: 2023-04-21 | End: 2023-04-21

## 2023-04-20 RX ADMIN — SALINE NASAL SPRAY 1 SPRAY: 1.5 SOLUTION NASAL at 21:13

## 2023-04-20 RX ADMIN — IPRATROPIUM BROMIDE AND ALBUTEROL SULFATE 1 AMPULE: 2.5; .5 SOLUTION RESPIRATORY (INHALATION) at 21:44

## 2023-04-20 RX ADMIN — PANTOPRAZOLE SODIUM 40 MG: 40 TABLET, DELAYED RELEASE ORAL at 05:00

## 2023-04-20 RX ADMIN — LORAZEPAM 0.5 MG: 0.5 TABLET ORAL at 21:14

## 2023-04-20 RX ADMIN — IPRATROPIUM BROMIDE AND ALBUTEROL SULFATE 1 AMPULE: 2.5; .5 SOLUTION RESPIRATORY (INHALATION) at 07:35

## 2023-04-20 RX ADMIN — Medication 10 ML: at 21:15

## 2023-04-20 RX ADMIN — ATORVASTATIN CALCIUM 40 MG: 40 TABLET, FILM COATED ORAL at 09:05

## 2023-04-20 RX ADMIN — PIRFENIDONE 267 MG: 801 TABLET, FILM COATED ORAL at 21:14

## 2023-04-20 RX ADMIN — ALBUTEROL SULFATE 2.5 MG: 2.5 SOLUTION RESPIRATORY (INHALATION) at 16:53

## 2023-04-20 RX ADMIN — SERTRALINE 200 MG: 100 TABLET, FILM COATED ORAL at 09:05

## 2023-04-20 RX ADMIN — MIRTAZAPINE 30 MG: 30 TABLET, FILM COATED ORAL at 21:14

## 2023-04-20 RX ADMIN — ONDANSETRON 4 MG: 2 INJECTION INTRAMUSCULAR; INTRAVENOUS at 10:27

## 2023-04-20 RX ADMIN — SALINE NASAL SPRAY 1 SPRAY: 1.5 SOLUTION NASAL at 09:07

## 2023-04-20 RX ADMIN — ENOXAPARIN SODIUM 30 MG: 100 INJECTION SUBCUTANEOUS at 09:05

## 2023-04-20 RX ADMIN — INSULIN LISPRO 2 UNITS: 100 INJECTION, SOLUTION INTRAVENOUS; SUBCUTANEOUS at 09:12

## 2023-04-20 RX ADMIN — LEVOTHYROXINE SODIUM 125 MCG: 125 TABLET ORAL at 05:00

## 2023-04-20 RX ADMIN — PIRFENIDONE 267 MG: 801 TABLET, FILM COATED ORAL at 09:05

## 2023-04-20 RX ADMIN — Medication 10 ML: at 09:05

## 2023-04-20 RX ADMIN — DILTIAZEM HYDROCHLORIDE 240 MG: 240 CAPSULE, COATED, EXTENDED RELEASE ORAL at 09:05

## 2023-04-20 RX ADMIN — PREDNISONE 40 MG: 20 TABLET ORAL at 09:05

## 2023-04-20 RX ADMIN — AZITHROMYCIN DIHYDRATE 500 MG: 500 INJECTION, POWDER, LYOPHILIZED, FOR SOLUTION INTRAVENOUS at 09:11

## 2023-04-20 RX ADMIN — NADOLOL 20 MG: 40 TABLET ORAL at 09:05

## 2023-04-20 NOTE — FLOWSHEET NOTE
04/19/23 2330   Vital Signs   Temp 97.5 °F (36.4 °C)   Temp Source Oral   Heart Rate 99   Heart Rate Source Monitor   Resp 20   /70   MAP (Calculated) 90   BP Location Right upper arm   BP Method Automatic   Patient Position Semi fowlers; Lying left side   Level of Consciousness 0   MEWS Score 1   Pain Assessment   Pain Assessment None - Denies Pain   Pain Level 0   Oxygen Therapy   SpO2 100 %   O2 Device Non-rebreather mask     Vitals obtained, see above. Assessment completed, no changes from previous assessment.  Elver Quesada RN

## 2023-04-20 NOTE — FLOWSHEET NOTE
04/19/23 2000   Vital Signs   Temp 98.3 °F (36.8 °C)   Temp Source Oral   Heart Rate 83   Resp 22   BP (!) 102/59   MAP (Calculated) 73   BP Location Right upper arm   BP Method Automatic   Patient Position Semi fowlers; Lying left side   Level of Consciousness 0   MEWS Score 2   Oxygen Therapy   SpO2 100 %   O2 Device Nasal cannula   O2 Flow Rate (L/min) 5 L/min   O2 Delivery Method High flow nasal cannula     Pt awake and alert in bed. Vitals obtained. RR: 22, lungs clear bilaterally but pt has LEE and labored breathing. Pt on 5l NC and nonrebrearther PRN. Oriented times 4. Shift assessment completed, see flow sheet. Pt denies any pain at this time. Scheduled meds given with yogurt, see MAR. Pt denies any further needs at this time. Call light in reach.

## 2023-04-21 ENCOUNTER — APPOINTMENT (OUTPATIENT)
Dept: CT IMAGING | Age: 68
End: 2023-04-21
Payer: MEDICARE

## 2023-04-21 VITALS
SYSTOLIC BLOOD PRESSURE: 91 MMHG | RESPIRATION RATE: 22 BRPM | HEIGHT: 63 IN | BODY MASS INDEX: 19.22 KG/M2 | TEMPERATURE: 98.6 F | HEART RATE: 100 BPM | OXYGEN SATURATION: 100 % | WEIGHT: 108.5 LBS | DIASTOLIC BLOOD PRESSURE: 54 MMHG

## 2023-04-21 LAB
GLUCOSE BLD-MCNC: 108 MG/DL (ref 70–99)
GLUCOSE BLD-MCNC: 129 MG/DL (ref 70–99)
GLUCOSE BLD-MCNC: 156 MG/DL (ref 70–99)
PERFORMED ON: ABNORMAL

## 2023-04-21 PROCEDURE — 6370000000 HC RX 637 (ALT 250 FOR IP): Performed by: INTERNAL MEDICINE

## 2023-04-21 PROCEDURE — 71250 CT THORAX DX C-: CPT

## 2023-04-21 PROCEDURE — 2700000000 HC OXYGEN THERAPY PER DAY

## 2023-04-21 PROCEDURE — 99238 HOSP IP/OBS DSCHRG MGMT 30/<: CPT | Performed by: INTERNAL MEDICINE

## 2023-04-21 PROCEDURE — 99233 SBSQ HOSP IP/OBS HIGH 50: CPT | Performed by: INTERNAL MEDICINE

## 2023-04-21 PROCEDURE — 6360000002 HC RX W HCPCS: Performed by: INTERNAL MEDICINE

## 2023-04-21 PROCEDURE — 94640 AIRWAY INHALATION TREATMENT: CPT

## 2023-04-21 PROCEDURE — 94761 N-INVAS EAR/PLS OXIMETRY MLT: CPT

## 2023-04-21 PROCEDURE — 2580000003 HC RX 258: Performed by: INTERNAL MEDICINE

## 2023-04-21 RX ORDER — PREDNISONE 10 MG/1
TABLET ORAL
Qty: 30 TABLET | Refills: 0 | Status: SHIPPED | OUTPATIENT
Start: 2023-04-21

## 2023-04-21 RX ADMIN — ACETAMINOPHEN 650 MG: 325 TABLET ORAL at 05:39

## 2023-04-21 RX ADMIN — PANTOPRAZOLE SODIUM 40 MG: 40 TABLET, DELAYED RELEASE ORAL at 05:39

## 2023-04-21 RX ADMIN — AZITHROMYCIN 500 MG: 250 TABLET, FILM COATED ORAL at 08:43

## 2023-04-21 RX ADMIN — ENOXAPARIN SODIUM 30 MG: 100 INJECTION SUBCUTANEOUS at 08:43

## 2023-04-21 RX ADMIN — LEVOTHYROXINE SODIUM 125 MCG: 125 TABLET ORAL at 05:39

## 2023-04-21 RX ADMIN — Medication 10 ML: at 08:45

## 2023-04-21 RX ADMIN — NADOLOL 20 MG: 40 TABLET ORAL at 08:43

## 2023-04-21 RX ADMIN — PIRFENIDONE 267 MG: 801 TABLET, FILM COATED ORAL at 08:44

## 2023-04-21 RX ADMIN — SERTRALINE 200 MG: 100 TABLET, FILM COATED ORAL at 08:43

## 2023-04-21 RX ADMIN — ATORVASTATIN CALCIUM 40 MG: 40 TABLET, FILM COATED ORAL at 08:42

## 2023-04-21 RX ADMIN — PREDNISONE 40 MG: 20 TABLET ORAL at 08:42

## 2023-04-21 RX ADMIN — IPRATROPIUM BROMIDE AND ALBUTEROL SULFATE 1 AMPULE: 2.5; .5 SOLUTION RESPIRATORY (INHALATION) at 07:27

## 2023-04-21 RX ADMIN — DILTIAZEM HYDROCHLORIDE 240 MG: 240 CAPSULE, COATED, EXTENDED RELEASE ORAL at 08:42

## 2023-04-21 RX ADMIN — SALINE NASAL SPRAY 1 SPRAY: 1.5 SOLUTION NASAL at 08:45

## 2023-04-21 RX ADMIN — LORAZEPAM 0.5 MG: 0.5 TABLET ORAL at 05:39

## 2023-04-21 ASSESSMENT — PAIN DESCRIPTION - DESCRIPTORS: DESCRIPTORS: ACHING

## 2023-04-21 ASSESSMENT — PAIN DESCRIPTION - ORIENTATION: ORIENTATION: OTHER (COMMENT)

## 2023-04-21 ASSESSMENT — PAIN SCALES - GENERAL: PAINLEVEL_OUTOF10: 7

## 2023-04-21 ASSESSMENT — PAIN DESCRIPTION - LOCATION: LOCATION: HEAD

## 2023-04-21 NOTE — PLAN OF CARE
HEART FAILURE CARE PLAN:    Comorbidities Reviewed: Yes   Patient has a past medical history of A-fib ([facility] [address] Anxiety, COPD (chronic obstructive pulmonary disease) ([facility] [address] Cryptogenic organizing pneumonia ([facility] [address] Depression, GERD (gastroesophageal reflux disease), Hyperlipidemia, On home oxygen therapy, Rash, and Thyroid disease. ECHOCARDIOGRAM Reviewed: Yes   Patient's Ejection Fraction (EF) is greater than 40%    Weights Reviewed: Yes   Admission weight: 111 lb (50.3 kg)   Wt Readings from Last 3 Encounters:   04/18/23 114 lb 4.8 oz (51.8 kg)   04/09/23 111 lb (50.3 kg)   04/06/23 110 lb (49.9 kg)     Intake & Output Reviewed: Yes     Intake/Output Summary (Last 24 hours) at 4/18/2023 2054  Last data filed at 4/17/2023 2327  Gross per 24 hour   Intake 42.9 ml   Output --   Net 42.9 ml     Medications Reviewed: Yes   SCHEDULED HOSPITAL MEDICATIONS:   dilTIAZem  240 mg Oral Daily    levothyroxine  125 mcg Oral Daily    mirtazapine  30 mg Oral Nightly    nadolol  20 mg Oral Daily    pantoprazole  40 mg Oral QAM AC    atorvastatin  40 mg Oral Daily    Pirfenidone  1 tablet Oral TID    sertraline  200 mg Oral Daily    sodium chloride  1 spray Nasal BID    azithromycin  500 mg IntraVENous Q24H    sodium chloride flush  10 mL IntraVENous 2 times per day    enoxaparin  40 mg SubCUTAneous Daily    ipratropium-albuterol  1 ampule Inhalation Q4H WA    methylPREDNISolone  40 mg IntraVENous Q12H    Followed by    Ranjit Pringle ON 4/20/2023] predniSONE  40 mg Oral Daily    insulin lispro  0-4 Units SubCUTAneous TID     insulin lispro  0-4 Units SubCUTAneous Nightly         Diet Reviewed: Yes   ADULT DIET; Regular    Goal of Care Reviewed: Yes   Patient and/or Family's stated Goal of Care this Admission: reduce shortness of breath, increase activity tolerance, and be more comfortable prior to discharge.
HEART FAILURE CARE PLAN:    Comorbidities Reviewed: Yes   Patient has a past medical history of A-fib ([facility] [address] Anxiety, COPD (chronic obstructive pulmonary disease) ([facility] [address] [facility] Depression, GERD (gastroesophageal reflux disease), Hyperlipidemia, On home oxygen therapy, Rash, and Thyroid disease. ECHOCARDIOGRAM Reviewed: Yes   Patient's Ejection Fraction (EF) is greater than 40%    Weights Reviewed: Yes   Admission weight: 111 lb (50.3 kg)   Wt Readings from Last 3 Encounters:   04/21/23 108 lb 8 oz (49.2 kg)   04/09/23 111 lb (50.3 kg)   04/06/23 110 lb (49.9 kg)     Intake & Output Reviewed: Yes     Intake/Output Summary (Last 24 hours) at 4/21/2023 1117  Last data filed at 4/21/2023 6029  Gross per 24 hour   Intake 360 ml   Output --   Net 360 ml     Medications Reviewed: Yes   SCHEDULED HOSPITAL MEDICATIONS:   enoxaparin  30 mg SubCUTAneous Daily    Pirfenidone  267 mg Oral BID    ipratropium-albuterol  1 ampule Inhalation TID    dilTIAZem  240 mg Oral Daily    levothyroxine  125 mcg Oral Daily    mirtazapine  30 mg Oral Nightly    nadolol  20 mg Oral Daily    pantoprazole  40 mg Oral QAM AC    atorvastatin  40 mg Oral Daily    sertraline  200 mg Oral Daily    sodium chloride  1 spray Nasal BID    sodium chloride flush  10 mL IntraVENous 2 times per day    predniSONE  40 mg Oral Daily    insulin lispro  0-4 Units SubCUTAneous TID WC    insulin lispro  0-4 Units SubCUTAneous Nightly     ACE/ARB/ARNI is REQUIRED for EF </= 35% SYSTOLIC FAILURE:   ACE[de-identified] None  ARB[de-identified] None  ARNI[de-identified] None    Evidenced-Based Beta Blocker is REQUIRED for EF </= 14% SYSTOLIC FAILURE:   [de-identified] None    Diuretics:  [de-identified] None    Diet Reviewed: Yes   ADULT DIET; Regular    Goal of Care Reviewed: Yes   Patient and/or Family's stated Goal of Care this Admission: better understand heart failure and disease management, be more comfortable, and reduce lower extremity edema prior to discharge.
Problem: Cardiovascular - Adult  Goal: Maintains optimal cardiac output and hemodynamic stability  Outcome: Progressing  Goal: Absence of cardiac dysrhythmias or at baseline  Outcome: Progressing     Problem: Safety - Adult  Goal: Free from fall injury  Outcome: Progressing
Nasal BID    sodium chloride flush  10 mL IntraVENous 2 times per day    predniSONE  40 mg Oral Daily    insulin lispro  0-4 Units SubCUTAneous TID WC    insulin lispro  0-4 Units SubCUTAneous Nightly     ACE/ARB/ARNI is REQUIRED for EF </= 11% SYSTOLIC FAILURE:   ACE[de-identified] None  ARB[de-identified] None  ARNI[de-identified] None    Evidenced-Based Beta Blocker is REQUIRED for EF </= 25% SYSTOLIC FAILURE:   [de-identified] None    Diuretics:  [de-identified] None    Diet Reviewed: Yes   ADULT DIET; Regular    Goal of Care Reviewed: Yes   Patient and/or Family's stated Goal of Care this Admission: reduce shortness of breath, increase activity tolerance, and be more comfortable prior to discharge.        Problem: Cardiovascular - Adult  Goal: Maintains optimal cardiac output and hemodynamic stability  4/21/2023 1238 by Segundo Avery RN  Outcome: Adequate for Discharge  4/21/2023 1123 by Tex Madison RN  Outcome: Adequate for Discharge  4/21/2023 0440 by Gary Sotomayor RN  Outcome: Progressing  Goal: Absence of cardiac dysrhythmias or at baseline  4/21/2023 1238 by Segundo Avery RN  Outcome: Adequate for Discharge  4/21/2023 1123 by Tex Madison RN  Outcome: Adequate for Discharge  4/21/2023 0440 by Gary Sotomayor RN  Outcome: Progressing     Problem: Safety - Adult  Goal: Free from fall injury  4/21/2023 1238 by Segundo Avery RN  Outcome: Adequate for Discharge  4/21/2023 1123 by Tex Madison RN  Outcome: Adequate for Discharge  4/21/2023 0440 by Gary Sotomayor RN  Outcome: Progressing
SYSTOLIC FAILURE:   [de-identified] None    Diuretics:  [de-identified] None    Diet Reviewed: Yes   ADULT DIET; Regular    Goal of Care Reviewed: Yes   Patient and/or Family's stated Goal of Care this Admission: reduce shortness of breath, increase activity tolerance, and be more comfortable prior to discharge.        Problem: Cardiovascular - Adult  Goal: Maintains optimal cardiac output and hemodynamic stability  Outcome: Progressing  Goal: Absence of cardiac dysrhythmias or at baseline  Outcome: Progressing     Problem: Safety - Adult  Goal: Free from fall injury  Outcome: Progressing
Failure.   []  Other:            Electronically signed by Jamel Hodgson, MSN, RN  on 4/21/2023 at 12:10 PM

## 2023-04-21 NOTE — PROGRESS NOTES
[facility]                                 Pulmonary Consult /Progress Note :                                                                  CHIEF COMPLAINT: SOB  Reason ILD with acute exacerbation. Hypoxia acute on chronic. Worsening hypoxia       HPI:     Doing better  O2 down to 6 L from 10  Denies any chest pain  States is feeing better   No fever  Mild cough     Objective:   PHYSICAL EXAM:    Blood pressure 122/72, pulse (!) 101, temperature (!) 96.7 °F (35.9 °C), temperature source Axillary, resp. rate 24, height 5' 3\" (1.6 m), weight 105 lb 6.4 oz (47.8 kg), SpO2 100 %, not currently breastfeeding.' on RA  Gen: No distress. Eyes: PERRL. No sclera icterus. No conjunctival injection. ENT: No discharge. Pharynx clear. Neck: Trachea midline. No obvious mass. Resp: Rhonchi bilaterally no clear Rales   CV: Regular rate. Regular rhythm. No murmur or rub. No edema. GI: Non-tender. Non-distended. No hernia. Skin: Warm and dry. No nodule on exposed extremities. Lymph: No cervical LAD. No supraclavicular LAD. M/S: No cyanosis. No joint deformity. No clubbing. Neuro: Awake. Alert. Moves all four extremities. Psych: Oriented x 3. No anxiety.            DATA reviewed by me:   CXR  CT               LABS/IMAGING:    CBC:  Lab Results   Component Value Date    WBC 5.0 04/19/2023    HGB 7.2 (L) 04/19/2023    HCT 23.1 (L) 04/19/2023    MCV 77.9 (L) 04/19/2023     (L) 04/19/2023    LYMPHOPCT 8.2 04/19/2023    RBC 2.97 (L) 04/19/2023    MCH 24.4 (L) 04/19/2023    MCHC 31.3 04/19/2023    RDW 16.2 (H) 04/19/2023    NEUTOPHILPCT 86.4 04/19/2023    MONOPCT 5.3 04/19/2023    BASOPCT 0.1 04/19/2023    NEUTROABS 4.3 04/19/2023    LYMPHSABS 0.4 (L) 04/19/2023    MONOSABS 0.3 04/19/2023    EOSABS 0.0 04/19/2023    BASOSABS 0.0 04/19/2023       Recent Labs     04/19/23  0434 04/17/23  1853 04/09/23  2256   WBC 5.0 7.7 13.4*   HGB 7.2* 9.7* 8.7*
As this writer was helping pt up to [facility], I noticed some slight crepitus over L lung field that was not there with earlier assessment. Informed charge MICK Reynolds and called Mo Cruz from respiratory to also feel, both confirmed they felt slight crepitus as well. Nocturnist notified, stat CXR ordered. Clinical also notified.
Bedside report given to Faiza Florentino RN. Care transferred.
Bedside report given to Mack Regan RN. Care transferred.
Bedside report given to [facility], RN. Care transferred.
End of shift report given to Yordy Wallace RN and MICK Hui. Transfer of care done at this time.
PRN ativan given at this time per patient request.
Patient arrived via ems from [facility], currently on 8 liters 02, changed to 8 liters high flow oxygen, patient frail and unable to move easily without desat, Clinical also at bedside.   Refused 4 eyes because patient refuses to move, patient with increased anxiety, RN at bedside to comfort, oriented to room, call light within reach
Patient c/o nausea, PRN zofran given.
Patient called out complaining of SOB. SpO2 68% on 5LHFNC.  O2 titrated to 11L at this time, SpO2 94%
Patient converted back to NSR.
Patient resting quietly in bed. Remains on 5L HFNC, however patient continues to keep NRB close by and also uses mask frequently. Shift assessment complete, see flow sheet. HR up to 160s at times, scheduled cardizem given at this time. Denies needs at this time. Call light in reach. Will monitor.
Progress Note    Admit Date:  4/17/2023    Subjective:  Ms. Ruben Kumar is a patient with interstitial lung disease with multiple hospital admissions was admitted to hospital for shortness of breath and ID exacerbation. She uses between 6 and 8 L of oxygen at home. Admitted for acute respiratory failure. She is tired and is not in the mood to have a conversation. 4/20- doing about the same. Objective:   /69   Pulse (!) 113   Temp 97.9 °F (36.6 °C) (Axillary)   Resp 18   Ht 5' 3\" (1.6 m)   Wt 109 lb (49.4 kg)   SpO2 91%   BMI 19.31 kg/m²        Intake/Output Summary (Last 24 hours) at 4/20/2023 1146  Last data filed at 4/19/2023 2353  Gross per 24 hour   Intake --   Output 300 ml   Net -300 ml         Physical Exam:    General appearance: Ill appearing  Head: Normocephalic, without obvious abnormality, atraumatic  Eyes: conjunctivae/corneas clear. PERRL, EOM's intact. Neck: no adenopathy, no carotid bruit, no JVD, supple, symmetrical, trachea midline and thyroid not enlarged, symmetric, no tenderness/mass/nodules  Lungs: Mild accessory muscle use. Diminished breath sounds. Bilateral crackles.     Heart: regular rate and rhythm, S1, S2 normal, no murmur, click, rub or gallop  Abdomen: soft, non-tender; bowel sounds normal; no masses,  no organomegaly  Extremities: extremities normal, atraumatic, no cyanosis or edema  Pulses: 2+ and symmetric  Skin: Skin color, texture, turgor normal. No rashes or lesions  Neurologic: Grossly normal    Scheduled Meds:   [START ON 4/21/2023] azithromycin  500 mg Oral Once    enoxaparin  30 mg SubCUTAneous Daily    Pirfenidone  267 mg Oral BID    ipratropium-albuterol  1 ampule Inhalation TID    dilTIAZem  240 mg Oral Daily    levothyroxine  125 mcg Oral Daily    mirtazapine  30 mg Oral Nightly    nadolol  20 mg Oral Daily    pantoprazole  40 mg Oral QAM AC    atorvastatin  40 mg Oral Daily    sertraline  200 mg Oral Daily    sodium chloride  1 spray Nasal BID
Progress Note    Admit Date:  4/17/2023    Subjective:  Ms. Wayne Whitaker is a patient with interstitial lung disease with multiple hospital admissions was admitted to hospital for shortness of breath and ID exacerbation. She uses between 6 and 8 L of oxygen at home. Admitted for acute respiratory failure. She is tired and is not in the mood to have a conversation. Objective:   /67   Pulse 86   Temp 97.6 °F (36.4 °C) (Axillary)   Resp 20   Ht 5' 3\" (1.6 m)   Wt 105 lb 6.4 oz (47.8 kg)   SpO2 100%   BMI 18.67 kg/m²        Intake/Output Summary (Last 24 hours) at 4/19/2023 1407  Last data filed at 4/19/2023 3253  Gross per 24 hour   Intake 120 ml   Output 255 ml   Net -135 ml       Physical Exam:    General appearance: Ill appearing  Head: Normocephalic, without obvious abnormality, atraumatic  Eyes: conjunctivae/corneas clear. PERRL, EOM's intact. Neck: no adenopathy, no carotid bruit, no JVD, supple, symmetrical, trachea midline and thyroid not enlarged, symmetric, no tenderness/mass/nodules  Lungs: Mild accessory muscle use. Diminished breath sounds. Bilateral crackles.     Heart: regular rate and rhythm, S1, S2 normal, no murmur, click, rub or gallop  Abdomen: soft, non-tender; bowel sounds normal; no masses,  no organomegaly  Extremities: extremities normal, atraumatic, no cyanosis or edema  Pulses: 2+ and symmetric  Skin: Skin color, texture, turgor normal. No rashes or lesions  Neurologic: Grossly normal    Scheduled Meds:   [START ON 4/20/2023] enoxaparin  30 mg SubCUTAneous Daily    Pirfenidone  267 mg Oral BID    ipratropium-albuterol  1 ampule Inhalation TID    dilTIAZem  240 mg Oral Daily    levothyroxine  125 mcg Oral Daily    mirtazapine  30 mg Oral Nightly    nadolol  20 mg Oral Daily    pantoprazole  40 mg Oral QAM AC    atorvastatin  40 mg Oral Daily    sertraline  200 mg Oral Daily    sodium chloride  1 spray Nasal BID    azithromycin  500 mg IntraVENous Q24H    sodium chloride
Pt O2 decreased from 9L to 7L HFNC. Pt is at 96%.
Pt discharged via wheelchair with family. Pt has no complaints of pain. Pt is on 5 lpm O2 NC.  IV and telemetry removed. Discharge instructions given.  Pt is stable
Pt off floor at this time for ordered CT scan.
Pt resting in bed with eyes closed. She denies any pain but requests PRN ativan for anxiety related to feeling SOB. Assessment complete-see flowsheet. Medications given-see MAR. Pt denies further needs, call light and bedside table within reach. Will continue to monitor.
Pt returned to room at this time from CT scan.
Pt showing a rhythm changed to aflutter on tele. HR: 112. EKG ordered.
RN spoke with patient regarding home medications. Patient provided RN with list on cell phone of medications she is currently taking. Home med list updated.
RT Inhaler-Nebulizer Bronchodilator Protocol Note    There is a bronchodilator order in the chart from a provider indicating to follow the RT Bronchodilator Protocol and there is an Initiate RT Inhaler-Nebulizer Bronchodilator Protocol order as well (see protocol at bottom of note). CXR Findings:  XR CHEST PORTABLE    Result Date: 4/17/2023  No acute findings. Chronic pulmonary fibrosis. The findings from the last RT Protocol Assessment were as follows:   History Pulmonary Disease: (P) Chronic pulmonary disease  Respiratory Pattern: (P) Dyspnea on exertion or RR 21-25 bpm  Breath Sounds: (P) Slightly diminished and/or crackles  Cough: (P) Strong, spontaneous, non-productive  Indication for Bronchodilator Therapy: (P) Decreased or absent breath sounds  Bronchodilator Assessment Score: (P) 6    Aerosolized bronchodilator medication orders have been revised according to the RT Inhaler-Nebulizer Bronchodilator Protocol below. Respiratory Therapist to perform RT Therapy Protocol Assessment initially then follow the protocol. Repeat RT Therapy Protocol Assessment PRN for score 0-3 or on second treatment, BID, and PRN for scores above 3. No Indications - adjust the frequency to every 6 hours PRN wheezing or bronchospasm, if no treatments needed after 48 hours then discontinue using Per Protocol order mode. If indication present, adjust the RT bronchodilator orders based on the Bronchodilator Assessment Score as indicated below. Use Inhaler orders unless patient has one or more of the following: on home nebulizer, not able to hold breath for 10 seconds, is not alert and oriented, cannot activate and use MDI correctly, or respiratory rate 25 breaths per minute or more, then use the equivalent nebulizer order(s) with same Frequency and PRN reasons based on the score. If a patient is on this medication at home then do not decrease Frequency below that used at home.     0-3 - enter or revise RT
RT Inhaler-Nebulizer Bronchodilator Protocol Note    There is a bronchodilator order in the chart from a provider indicating to follow the RT Bronchodilator Protocol and there is an Initiate RT Inhaler-Nebulizer Bronchodilator Protocol order as well (see protocol at bottom of note). CXR Findings:  XR CHEST PORTABLE    Result Date: 4/17/2023  No acute findings. Chronic pulmonary fibrosis. The findings from the last RT Protocol Assessment were as follows:   History Pulmonary Disease: (P) Chronic pulmonary disease  Respiratory Pattern: (P) Dyspnea on exertion or RR 21-25 bpm  Breath Sounds: (P) Slightly diminished and/or crackles  Cough: (P) Strong, spontaneous, non-productive  Indication for Bronchodilator Therapy: (P) On home bronchodilators  Bronchodilator Assessment Score: (P) 6    Aerosolized bronchodilator medication orders have been revised according to the RT Inhaler-Nebulizer Bronchodilator Protocol below. Respiratory Therapist to perform RT Therapy Protocol Assessment initially then follow the protocol. Repeat RT Therapy Protocol Assessment PRN for score 0-3 or on second treatment, BID, and PRN for scores above 3. No Indications - adjust the frequency to every 6 hours PRN wheezing or bronchospasm, if no treatments needed after 48 hours then discontinue using Per Protocol order mode. If indication present, adjust the RT bronchodilator orders based on the Bronchodilator Assessment Score as indicated below. Use Inhaler orders unless patient has one or more of the following: on home nebulizer, not able to hold breath for 10 seconds, is not alert and oriented, cannot activate and use MDI correctly, or respiratory rate 25 breaths per minute or more, then use the equivalent nebulizer order(s) with same Frequency and PRN reasons based on the score. If a patient is on this medication at home then do not decrease Frequency below that used at home.     0-3 - enter or revise RT bronchodilator
RT Inhaler-Nebulizer Bronchodilator Protocol Note    There is a bronchodilator order in the chart from a provider indicating to follow the RT Bronchodilator Protocol and there is an Initiate RT Inhaler-Nebulizer Bronchodilator Protocol order as well (see protocol at bottom of note). CXR Findings:  XR CHEST PORTABLE    Result Date: 4/17/2023  No acute findings. Chronic pulmonary fibrosis. The findings from the last RT Protocol Assessment were as follows:   History Pulmonary Disease: Chronic pulmonary disease  Respiratory Pattern: Dyspnea on exertion or RR 21-25 bpm  Breath Sounds: Slightly diminished and/or crackles  Cough: Strong, spontaneous, non-productive  Indication for Bronchodilator Therapy: Decreased or absent breath sounds  Bronchodilator Assessment Score: 6    Aerosolized bronchodilator medication orders have been revised according to the RT Inhaler-Nebulizer Bronchodilator Protocol below. Respiratory Therapist to perform RT Therapy Protocol Assessment initially then follow the protocol. Repeat RT Therapy Protocol Assessment PRN for score 0-3 or on second treatment, BID, and PRN for scores above 3. No Indications - adjust the frequency to every 6 hours PRN wheezing or bronchospasm, if no treatments needed after 48 hours then discontinue using Per Protocol order mode. If indication present, adjust the RT bronchodilator orders based on the Bronchodilator Assessment Score as indicated below. Use Inhaler orders unless patient has one or more of the following: on home nebulizer, not able to hold breath for 10 seconds, is not alert and oriented, cannot activate and use MDI correctly, or respiratory rate 25 breaths per minute or more, then use the equivalent nebulizer order(s) with same Frequency and PRN reasons based on the score. If a patient is on this medication at home then do not decrease Frequency below that used at home.     0-3 - enter or revise RT bronchodilator order(s) to
RT Inhaler-Nebulizer Bronchodilator Protocol Note    There is a bronchodilator order in the chart from a provider indicating to follow the RT Bronchodilator Protocol and there is an Initiate RT Inhaler-Nebulizer Bronchodilator Protocol order as well (see protocol at bottom of note). CXR Findings:  XR CHEST PORTABLE    Result Date: 4/21/2023  Limited exam due to the hypoinflation overlying artifact Stable cardiomegaly with increasing central pulmonary congestion. Hazy interstitial and ground-glass opacities throughout both lungs which is slightly more prominent and may be due to pulmonary edema or developing early pneumonia       The findings from the last RT Protocol Assessment were as follows:   History Pulmonary Disease: Chronic pulmonary disease  Respiratory Pattern: Dyspnea on exertion or RR 21-25 bpm  Breath Sounds: Slightly diminished and/or crackles  Cough: Strong, spontaneous, non-productive  Indication for Bronchodilator Therapy: Decreased or absent breath sounds  Bronchodilator Assessment Score: 6    Aerosolized bronchodilator medication orders have been revised according to the RT Inhaler-Nebulizer Bronchodilator Protocol below. Respiratory Therapist to perform RT Therapy Protocol Assessment initially then follow the protocol. Repeat RT Therapy Protocol Assessment PRN for score 0-3 or on second treatment, BID, and PRN for scores above 3. No Indications - adjust the frequency to every 6 hours PRN wheezing or bronchospasm, if no treatments needed after 48 hours then discontinue using Per Protocol order mode. If indication present, adjust the RT bronchodilator orders based on the Bronchodilator Assessment Score as indicated below.   Use Inhaler orders unless patient has one or more of the following: on home nebulizer, not able to hold breath for 10 seconds, is not alert and oriented, cannot activate and use MDI correctly, or respiratory rate 25 breaths per minute or more, then use the
Report given to Aries Bello
--  130* 145 190         BMP:   No results for input(s): NA, K, CL, CO2, PHOS, BUN, CREATININE, CA in the last 72 hours. MG:   Lab Results   Component Value Date/Time    MG 1.70 04/17/2023 06:53 PM     Ca/Phos:   Lab Results   Component Value Date    CALCIUM 8.4 04/18/2023    PHOS 1.8 (L) 03/25/2023     LIVER PROFILE:   No results for input(s): AST, ALT, LIPASE, BILIDIR, BILITOT, ALKPHOS in the last 72 hours. Invalid input(s): AMYLASE,  ALB        PT/INR: No results for input(s): PROTIME, INR in the last 72 hours. APTT: No results for input(s): APTT in the last 72 hours.     Cardiac Enzymes:  Lab Results   Component Value Date    CKTOTAL 23 (L) 07/10/2019    TROPONINI <0.01 04/17/2023       Assessment:       -Acute hypoxic respiratory failure  Acute flare of ILD   Based on the biopsies, seems h/o of cryptogenic organizing pneumonia and possibly coexisting NSIP or chronic HP.    -History of COPD  -Chronic respiratory failure-      Plan:      *-procalcitonin normal   *-will do work up for pneumonia so far negative   *- Agree with abx and steroids,wean over 10 days  on azithromycin   *- BD  *-restart  pirfenidone 1 tab tid to increase gradually as Op   Transplant clinic as OP   Pulmonary rehab as OP  Dc planning
at home. 0-3 - enter or revise RT bronchodilator order(s) to equivalent RT Bronchodilator order with Frequency of every 4 hours PRN for wheezing or increased work of breathing using Per Protocol order mode. 4-6 - enter or revise RT Bronchodilator order(s) to two equivalent RT bronchodilator orders with one order with BID Frequency and one order with Frequency of every 4 hours PRN wheezing or increased work of breathing using Per Protocol order mode. 7-10 - enter or revise RT Bronchodilator order(s) to two equivalent RT bronchodilator orders with one order with TID Frequency and one order with Frequency of every 4 hours PRN wheezing or increased work of breathing using Per Protocol order mode. 11-13 - enter or revise RT Bronchodilator order(s) to one equivalent RT bronchodilator order with QID Frequency and an Albuterol order with Frequency of every 4 hours PRN wheezing or increased work of breathing using Per Protocol order mode. Greater than 13 - enter or revise RT Bronchodilator order(s) to one equivalent RT bronchodilator order with every 4 hours Frequency and an Albuterol order with Frequency of every 2 hours PRN wheezing or increased work of breathing using Per Protocol order mode.        Electronically signed by Natan Rincon RCP on 4/18/2023 at 3:27 AM
or increased work of breathing using Per Protocol order mode. 4-6 - enter or revise RT Bronchodilator order(s) to two equivalent RT bronchodilator orders with one order with BID Frequency and one order with Frequency of every 4 hours PRN wheezing or increased work of breathing using Per Protocol order mode. 7-10 - enter or revise RT Bronchodilator order(s) to two equivalent RT bronchodilator orders with one order with TID Frequency and one order with Frequency of every 4 hours PRN wheezing or increased work of breathing using Per Protocol order mode. 11-13 - enter or revise RT Bronchodilator order(s) to one equivalent RT bronchodilator order with QID Frequency and an Albuterol order with Frequency of every 4 hours PRN wheezing or increased work of breathing using Per Protocol order mode. Greater than 13 - enter or revise RT Bronchodilator order(s) to one equivalent RT bronchodilator order with every 4 hours Frequency and an Albuterol order with Frequency of every 2 hours PRN wheezing or increased work of breathing using Per Protocol order mode.          Electronically signed by Hernan Cabrera RCP on 4/20/2023 at 7:42 AM
or increased work of breathing using Per Protocol order mode. 4-6 - enter or revise RT Bronchodilator order(s) to two equivalent RT bronchodilator orders with one order with BID Frequency and one order with Frequency of every 4 hours PRN wheezing or increased work of breathing using Per Protocol order mode. 7-10 - enter or revise RT Bronchodilator order(s) to two equivalent RT bronchodilator orders with one order with TID Frequency and one order with Frequency of every 4 hours PRN wheezing or increased work of breathing using Per Protocol order mode. 11-13 - enter or revise RT Bronchodilator order(s) to one equivalent RT bronchodilator order with QID Frequency and an Albuterol order with Frequency of every 4 hours PRN wheezing or increased work of breathing using Per Protocol order mode. Greater than 13 - enter or revise RT Bronchodilator order(s) to one equivalent RT bronchodilator order with every 4 hours Frequency and an Albuterol order with Frequency of every 2 hours PRN wheezing or increased work of breathing using Per Protocol order mode. PATIENT WILL BENEFIT FROM TREATMENTS.      Electronically signed by Shannon Mckeon RCP on 4/19/2023 at 8:50 PM
patient is on this medication at home then do not decrease Frequency below that used at home. 0-3 - enter or revise RT bronchodilator order(s) to equivalent RT Bronchodilator order with Frequency of every 4 hours PRN for wheezing or increased work of breathing using Per Protocol order mode. 4-6 - enter or revise RT Bronchodilator order(s) to two equivalent RT bronchodilator orders with one order with BID Frequency and one order with Frequency of every 4 hours PRN wheezing or increased work of breathing using Per Protocol order mode. 7-10 - enter or revise RT Bronchodilator order(s) to two equivalent RT bronchodilator orders with one order with TID Frequency and one order with Frequency of every 4 hours PRN wheezing or increased work of breathing using Per Protocol order mode. 11-13 - enter or revise RT Bronchodilator order(s) to one equivalent RT bronchodilator order with QID Frequency and an Albuterol order with Frequency of every 4 hours PRN wheezing or increased work of breathing using Per Protocol order mode. Greater than 13 - enter or revise RT Bronchodilator order(s) to one equivalent RT bronchodilator order with every 4 hours Frequency and an Albuterol order with Frequency of every 2 hours PRN wheezing or increased work of breathing using Per Protocol order mode.      Electronically signed by Ken Connre RCP on 4/20/2023 at 9:49 PM
effort was made to ensure accuracy; however, inadvertent computerized transcription errors may be present.

## 2023-04-21 NOTE — DISCHARGE SUMMARY
REMERON  TAKE 1 TABLET EVERY NIGHT     nadolol 20 MG tablet  Commonly known as: CORGARD  Take 1 tablet by mouth daily     ondansetron 4 MG tablet  Commonly known as: ZOFRAN  Take 1 tablet by mouth 3 times daily as needed for Nausea or Vomiting     pantoprazole 40 MG tablet  Commonly known as: PROTONIX     predniSONE 10 MG tablet  Commonly known as: DELTASONE  4 tabs for 3 days 3 tabs for 3 days 2 tabs for 3 days 1 tabs for 3 days     sertraline 100 MG tablet  Commonly known as: ZOLOFT  TAKE 2 TABLETS EVERY DAY     sodium chloride 0.65 % nasal spray  Commonly known as: OCEAN  1 spray by Nasal route in the morning and at bedtime     traMADol 50 MG tablet  Commonly known as: Ultram  Take 1-2 tablets by mouth every 6 hours as needed for Pain for up to 180 days. Take lowest dose possible to manage pain           * This list has 2 medication(s) that are the same as other medications prescribed for you. Read the directions carefully, and ask your doctor or other care provider to review them with you. STOP taking these medications      UNABLE TO FIND            ASK your doctor about these medications      Pirfenidone 267 MG Tabs  Take 1 tablet by mouth in the morning, at noon, and at bedtime pirfenidone daily for a week and then BID for a week and then back to TID.     potassium chloride 20 MEQ extended release tablet  Commonly known as: KLOR-CON M  Take 1 tablet by mouth daily               Where to Get Your Medications        These medications were sent to [address] [facility], [address] [facility],[address]  [address] [facility], [address] [place] [address]      Phone: [phone]   predniSONE 10 MG tablet           Discharge Condition/Location: Stable/Very limited prognosis. Follow Up: Follow up with PCP.       Shreya Healy MD 4/21/2023 10:01 AM

## 2023-04-21 NOTE — CARE COORDINATION
DISCHARGE ORDER  Date/Time 2023 10:29 AM  Completed by: Maddie Botello RN, Case Management    Patient Name: Ren Miles      : 1955  Admitting Diagnosis: Pulmonary fibrosis ([facility] [address] [J84.10]  COPD exacerbation ([facility] [address] [J44.1]  Acute on chronic respiratory failure with hypoxia and hypercapnia ([facility] [address] [B79.13, J96.22]      Admit order Date and Status: IP 2023  (verify MD's last order for status of admission)      Noted discharge order. If applicable PT/OT recommendation at Discharge: NA  DME recommendation by PT/OT:NA  Confirmed discharge plan with patient at bedside and spouse via phone  Discharge Plan: Chart reviewed. Met with pt at bedside and explained the role of the CM. Plan: Return home in care of spouse and daughter. Family is able to provide most of the care and supervision but pt does have to spend some nights at home alone due to work schedule. Family has requested more HHC aid hours in the evenings but so far [employer] has only been able to a few evenings a week. CM explained that there are private pay agencies as well but family is unable to afford to private pay. Notification of DC call made to [facility] to restart Aide services (VM left with ). Notification to Oliverio Law at [facility] for skilled nurse, Orders and [facility] in [place] and will be pulled to resume services. [employer] will reach out to family within 48 hours to set up start of care. Spouse en route to hospital to  parent via private vehicle    Date of Last IMM Given: 2023    Has Home O2 in place on admit:  Yes (baseline 8 liters)  Informed of need to bring portable home O2 tank on day of discharge for nursing to connect prior to leaving:   Yes- spouse will bring tanks  Verbalized agreement/Understanding:   Yes  Pt is being d/c'd to home today. Pt's O2 sats are 100% on 5 liters. Discharge timeout done with Ez BARTON. All discharge needs and concerns addressed.

## 2023-04-22 LAB
BACTERIA BLD CULT ORG #2: NORMAL
BACTERIA BLD CULT: NORMAL

## 2023-04-24 ENCOUNTER — CARE COORDINATION (OUTPATIENT)
Dept: CASE MANAGEMENT | Age: 68
End: 2023-04-24

## 2023-04-24 DIAGNOSIS — I48.91 ATRIAL FIBRILLATION WITH RVR (HCC): Primary | ICD-10-CM

## 2023-04-24 PROCEDURE — 1111F DSCHRG MED/CURRENT MED MERGE: CPT | Performed by: FAMILY MEDICINE

## 2023-04-24 NOTE — CARE COORDINATION
working with them to provide additional hours for additional days. Daughter states she has a concentrator. When she feels anxiety \"out of breath\" she also uses her tanks by wearing 2 cannulas. They will discuss anxiety medication at PCP appt tomorrow. They plan to make this virtual as they do not go to in person appts at PCP. She is out of lorazepam and can only be filled at PCP appt. Daughter is contacting [address] [place] [address] today to ask about what is needed for next week's appt on Tuesday, 5/2, to discuss lung transplant. Daughter just got message that pulmonary rehab was approved by insurance and waiting for call from them to schedule. Also calling cardiology to reschedule appt for consult for Watchman. Denies needs. Care Transition Nurse reviewed discharge instructions, medical action plan, and red flags with caregiver who verbalized understanding. The caregiver was given an opportunity to ask questions and does not have any further questions or concerns at this time. Were discharge instructions available to patient? Yes. Reviewed appropriate site of care based on symptoms and resources available to patient including: PCP  Specialist  Home health. The caregiver agrees to contact the PCP office for questions related to their healthcare. Advance Care Planning:   Does patient have an Advance Directive: reviewed and current. Medication reconciliation was performed with caregiver, who verbalizes understanding of administration of home medications. Medications reviewed, 1111F entered: yes    Was patient discharged with a pulse oximeter? Has one and CTN educated to use this as a tool during time of high anxiety when she feels the need to use additional O2. Discussed positioning, activity level, energy conservation as well.      Non-face-to-face services provided:  Obtained and reviewed discharge summary and/or continuity of care documents  Education of patient/family/caregiver/guardian to support self-management-

## 2023-04-26 ENCOUNTER — TELEMEDICINE (OUTPATIENT)
Dept: FAMILY MEDICINE CLINIC | Age: 68
End: 2023-04-26

## 2023-04-26 DIAGNOSIS — J96.21 ACUTE ON CHRONIC RESPIRATORY FAILURE WITH HYPOXIA AND HYPERCAPNIA (HCC): Primary | ICD-10-CM

## 2023-04-26 DIAGNOSIS — L29.9 GENERALIZED PRURITUS: ICD-10-CM

## 2023-04-26 DIAGNOSIS — F41.9 ANXIETY: ICD-10-CM

## 2023-04-26 DIAGNOSIS — I50.32 CHRONIC DIASTOLIC CONGESTIVE HEART FAILURE (HCC): ICD-10-CM

## 2023-04-26 DIAGNOSIS — E03.9 ACQUIRED HYPOTHYROIDISM: ICD-10-CM

## 2023-04-26 DIAGNOSIS — Z09 HOSPITAL DISCHARGE FOLLOW-UP: ICD-10-CM

## 2023-04-26 DIAGNOSIS — J44.1 COPD EXACERBATION (HCC): ICD-10-CM

## 2023-04-26 DIAGNOSIS — F32.0 MILD SINGLE CURRENT EPISODE OF MAJOR DEPRESSIVE DISORDER (HCC): ICD-10-CM

## 2023-04-26 DIAGNOSIS — J96.22 ACUTE ON CHRONIC RESPIRATORY FAILURE WITH HYPOXIA AND HYPERCAPNIA (HCC): Primary | ICD-10-CM

## 2023-04-26 RX ORDER — HYDROXYZINE HYDROCHLORIDE 10 MG/1
10 TABLET, FILM COATED ORAL 3 TIMES DAILY PRN
Qty: 90 TABLET | Refills: 5 | Status: SHIPPED | OUTPATIENT
Start: 2023-04-26

## 2023-04-26 RX ORDER — SERTRALINE HYDROCHLORIDE 100 MG/1
TABLET, FILM COATED ORAL
Qty: 180 TABLET | Refills: 1 | Status: SHIPPED | OUTPATIENT
Start: 2023-04-26

## 2023-04-26 RX ORDER — MIRTAZAPINE 30 MG/1
30 TABLET, FILM COATED ORAL NIGHTLY
Qty: 90 TABLET | Refills: 1 | Status: SHIPPED | OUTPATIENT
Start: 2023-04-26

## 2023-04-26 RX ORDER — LORAZEPAM 0.5 MG/1
0.5 TABLET ORAL EVERY 6 HOURS PRN
Qty: 30 TABLET | Refills: 5 | Status: SHIPPED | OUTPATIENT
Start: 2023-04-26 | End: 2023-10-23

## 2023-04-30 ENCOUNTER — HOSPITAL ENCOUNTER (EMERGENCY)
Age: 68
Discharge: ANOTHER HOSPITAL | End: 2023-05-01
Attending: STUDENT IN AN ORGANIZED HEALTH CARE EDUCATION/TRAINING PROGRAM
Payer: MEDICARE

## 2023-04-30 ENCOUNTER — APPOINTMENT (OUTPATIENT)
Dept: GENERAL RADIOLOGY | Age: 68
End: 2023-04-30
Payer: MEDICARE

## 2023-04-30 ENCOUNTER — TELEPHONE (OUTPATIENT)
Dept: OTHER | Facility: CLINIC | Age: 68
End: 2023-04-30

## 2023-04-30 DIAGNOSIS — J44.1 COPD EXACERBATION (HCC): Primary | ICD-10-CM

## 2023-04-30 DIAGNOSIS — J96.21 ACUTE ON CHRONIC RESPIRATORY FAILURE WITH HYPOXIA (HCC): ICD-10-CM

## 2023-04-30 LAB
ALBUMIN SERPL-MCNC: 3.7 G/DL (ref 3.4–5)
ALBUMIN/GLOB SERPL: 1.7 {RATIO} (ref 1.1–2.2)
ALP SERPL-CCNC: 80 U/L (ref 40–129)
ALT SERPL-CCNC: 24 U/L (ref 10–40)
ANION GAP SERPL CALCULATED.3IONS-SCNC: 9 MMOL/L (ref 3–16)
AST SERPL-CCNC: 41 U/L (ref 15–37)
BASE EXCESS BLDV CALC-SCNC: 7.5 MMOL/L (ref -3–3)
BASOPHILS # BLD: 0 K/UL (ref 0–0.2)
BASOPHILS NFR BLD: 0.3 %
BILIRUB SERPL-MCNC: 0.3 MG/DL (ref 0–1)
BUN SERPL-MCNC: 13 MG/DL (ref 7–20)
CALCIUM SERPL-MCNC: 9.2 MG/DL (ref 8.3–10.6)
CHLORIDE SERPL-SCNC: 103 MMOL/L (ref 99–110)
CO2 BLDV-SCNC: 31 MMOL/L
CO2 SERPL-SCNC: 30 MMOL/L (ref 21–32)
COHGB MFR BLDV: 2 % (ref 0–1.5)
CREAT SERPL-MCNC: 0.6 MG/DL (ref 0.6–1.2)
DEPRECATED RDW RBC AUTO: 16.8 % (ref 12.4–15.4)
EOSINOPHIL # BLD: 0 K/UL (ref 0–0.6)
EOSINOPHIL NFR BLD: 0.1 %
GFR SERPLBLD CREATININE-BSD FMLA CKD-EPI: >60 ML/MIN/{1.73_M2}
GLUCOSE SERPL-MCNC: 201 MG/DL (ref 70–99)
HCO3 BLDV-SCNC: 29.8 MMOL/L (ref 23–29)
HCT VFR BLD AUTO: 30.9 % (ref 36–48)
HGB BLD-MCNC: 9.3 G/DL (ref 12–16)
LYMPHOCYTES # BLD: 0.6 K/UL (ref 1–5.1)
LYMPHOCYTES NFR BLD: 4.4 %
MCH RBC QN AUTO: 23.6 PG (ref 26–34)
MCHC RBC AUTO-ENTMCNC: 30.1 G/DL (ref 31–36)
MCV RBC AUTO: 78.6 FL (ref 80–100)
METHGB MFR BLDV: 0 %
MONOCYTES # BLD: 0.4 K/UL (ref 0–1.3)
MONOCYTES NFR BLD: 2.8 %
NEUTROPHILS # BLD: 12.7 K/UL (ref 1.7–7.7)
NEUTROPHILS NFR BLD: 92.4 %
O2 THERAPY: ABNORMAL
PCO2 BLDV: 33.5 MMHG (ref 40–50)
PH BLDV: 7.57 [PH] (ref 7.35–7.45)
PLATELET # BLD AUTO: 239 K/UL (ref 135–450)
PMV BLD AUTO: 7.9 FL (ref 5–10.5)
PO2 BLDV: 120.3 MMHG (ref 25–40)
POTASSIUM SERPL-SCNC: 4.6 MMOL/L (ref 3.5–5.1)
PROT SERPL-MCNC: 5.9 G/DL (ref 6.4–8.2)
RBC # BLD AUTO: 3.93 M/UL (ref 4–5.2)
SAO2 % BLDV: 99 %
SODIUM SERPL-SCNC: 142 MMOL/L (ref 136–145)
TROPONIN, HIGH SENSITIVITY: 12 NG/L (ref 0–14)
WBC # BLD AUTO: 13.7 K/UL (ref 4–11)

## 2023-04-30 PROCEDURE — 96374 THER/PROPH/DIAG INJ IV PUSH: CPT

## 2023-04-30 PROCEDURE — 93005 ELECTROCARDIOGRAM TRACING: CPT | Performed by: STUDENT IN AN ORGANIZED HEALTH CARE EDUCATION/TRAINING PROGRAM

## 2023-04-30 PROCEDURE — 85025 COMPLETE CBC W/AUTO DIFF WBC: CPT

## 2023-04-30 PROCEDURE — 99285 EMERGENCY DEPT VISIT HI MDM: CPT

## 2023-04-30 PROCEDURE — 2580000003 HC RX 258: Performed by: STUDENT IN AN ORGANIZED HEALTH CARE EDUCATION/TRAINING PROGRAM

## 2023-04-30 PROCEDURE — 80053 COMPREHEN METABOLIC PANEL: CPT

## 2023-04-30 PROCEDURE — 36415 COLL VENOUS BLD VENIPUNCTURE: CPT

## 2023-04-30 PROCEDURE — 71045 X-RAY EXAM CHEST 1 VIEW: CPT

## 2023-04-30 PROCEDURE — 82803 BLOOD GASES ANY COMBINATION: CPT

## 2023-04-30 PROCEDURE — 84484 ASSAY OF TROPONIN QUANT: CPT

## 2023-04-30 PROCEDURE — 6360000002 HC RX W HCPCS: Performed by: STUDENT IN AN ORGANIZED HEALTH CARE EDUCATION/TRAINING PROGRAM

## 2023-04-30 RX ADMIN — WATER 125 MG: 1 INJECTION INTRAMUSCULAR; INTRAVENOUS; SUBCUTANEOUS at 22:37

## 2023-04-30 ASSESSMENT — PAIN SCALES - GENERAL: PAINLEVEL_OUTOF10: 0

## 2023-05-01 ENCOUNTER — HOSPITAL ENCOUNTER (INPATIENT)
Age: 68
LOS: 8 days | Discharge: HOME | DRG: 196 | End: 2023-05-09
Attending: INTERNAL MEDICINE | Admitting: INTERNAL MEDICINE
Payer: MEDICARE

## 2023-05-01 VITALS
RESPIRATION RATE: 25 BRPM | SYSTOLIC BLOOD PRESSURE: 116 MMHG | HEIGHT: 63 IN | DIASTOLIC BLOOD PRESSURE: 73 MMHG | OXYGEN SATURATION: 100 % | BODY MASS INDEX: 19.49 KG/M2 | TEMPERATURE: 98.2 F | HEART RATE: 66 BPM | WEIGHT: 110 LBS

## 2023-05-01 LAB
ANION GAP SERPL CALCULATED.3IONS-SCNC: 14 MMOL/L (ref 3–16)
BASOPHILS # BLD: 0 K/UL (ref 0–0.2)
BASOPHILS NFR BLD: 0.3 %
BUN SERPL-MCNC: 15 MG/DL (ref 7–20)
CALCIUM SERPL-MCNC: 9 MG/DL (ref 8.3–10.6)
CHLORIDE SERPL-SCNC: 96 MMOL/L (ref 99–110)
CO2 SERPL-SCNC: 28 MMOL/L (ref 21–32)
CREAT SERPL-MCNC: <0.5 MG/DL (ref 0.6–1.2)
DEPRECATED RDW RBC AUTO: 16.6 % (ref 12.4–15.4)
EKG ATRIAL RATE: 86 BPM
EKG DIAGNOSIS: NORMAL
EKG P AXIS: 12 DEGREES
EKG P-R INTERVAL: 150 MS
EKG Q-T INTERVAL: 372 MS
EKG QRS DURATION: 70 MS
EKG QTC CALCULATION (BAZETT): 445 MS
EKG R AXIS: 2 DEGREES
EKG T AXIS: -18 DEGREES
EKG VENTRICULAR RATE: 86 BPM
EOSINOPHIL # BLD: 0 K/UL (ref 0–0.6)
EOSINOPHIL NFR BLD: 0 %
FOLATE SERPL-MCNC: 17.33 NG/ML (ref 4.78–24.2)
GFR SERPLBLD CREATININE-BSD FMLA CKD-EPI: >60 ML/MIN/{1.73_M2}
GLUCOSE BLD-MCNC: 137 MG/DL (ref 70–99)
GLUCOSE BLD-MCNC: 149 MG/DL (ref 70–99)
GLUCOSE BLD-MCNC: 151 MG/DL (ref 70–99)
GLUCOSE BLD-MCNC: 222 MG/DL (ref 70–99)
GLUCOSE SERPL-MCNC: 216 MG/DL (ref 70–99)
HCT VFR BLD AUTO: 30.4 % (ref 36–48)
HGB BLD-MCNC: 9.3 G/DL (ref 12–16)
IMMATURE RETIC FRACT: 0.5 (ref 0.21–0.37)
LDH SERPL L TO P-CCNC: 210 U/L (ref 100–190)
LYMPHOCYTES # BLD: 0.6 K/UL (ref 1–5.1)
LYMPHOCYTES NFR BLD: 4.6 %
MAGNESIUM SERPL-MCNC: 2 MG/DL (ref 1.8–2.4)
MCH RBC QN AUTO: 24.2 PG (ref 26–34)
MCHC RBC AUTO-ENTMCNC: 30.8 G/DL (ref 31–36)
MCV RBC AUTO: 78.8 FL (ref 80–100)
MONOCYTES # BLD: 0.2 K/UL (ref 0–1.3)
MONOCYTES NFR BLD: 1.6 %
NEUTROPHILS # BLD: 11.8 K/UL (ref 1.7–7.7)
NEUTROPHILS NFR BLD: 93.5 %
NT-PROBNP SERPL-MCNC: 615 PG/ML (ref 0–124)
PERFORMED ON: ABNORMAL
PLATELET # BLD AUTO: 191 K/UL (ref 135–450)
PMV BLD AUTO: 7.1 FL (ref 5–10.5)
POTASSIUM SERPL-SCNC: 3.9 MMOL/L (ref 3.5–5.1)
PROCALCITONIN SERPL IA-MCNC: 0.03 NG/ML (ref 0–0.15)
RBC # BLD AUTO: 3.86 M/UL (ref 4–5.2)
RETICS # AUTO: 0.07 M/UL (ref 0.02–0.1)
RETICS/RBC NFR AUTO: 1.83 % (ref 0.5–2.18)
SODIUM SERPL-SCNC: 138 MMOL/L (ref 136–145)
VIT B12 SERPL-MCNC: 432 PG/ML (ref 211–911)
WBC # BLD AUTO: 12.6 K/UL (ref 4–11)

## 2023-05-01 PROCEDURE — 85025 COMPLETE CBC W/AUTO DIFF WBC: CPT

## 2023-05-01 PROCEDURE — 83540 ASSAY OF IRON: CPT

## 2023-05-01 PROCEDURE — 2060000000 HC ICU INTERMEDIATE R&B

## 2023-05-01 PROCEDURE — 82607 VITAMIN B-12: CPT

## 2023-05-01 PROCEDURE — 6370000000 HC RX 637 (ALT 250 FOR IP): Performed by: INTERNAL MEDICINE

## 2023-05-01 PROCEDURE — 2700000000 HC OXYGEN THERAPY PER DAY

## 2023-05-01 PROCEDURE — 6360000002 HC RX W HCPCS: Performed by: INTERNAL MEDICINE

## 2023-05-01 PROCEDURE — 84443 ASSAY THYROID STIM HORMONE: CPT

## 2023-05-01 PROCEDURE — 84145 PROCALCITONIN (PCT): CPT

## 2023-05-01 PROCEDURE — 83735 ASSAY OF MAGNESIUM: CPT

## 2023-05-01 PROCEDURE — 99223 1ST HOSP IP/OBS HIGH 75: CPT | Performed by: INTERNAL MEDICINE

## 2023-05-01 PROCEDURE — 83615 LACTATE (LD) (LDH) ENZYME: CPT

## 2023-05-01 PROCEDURE — 94761 N-INVAS EAR/PLS OXIMETRY MLT: CPT

## 2023-05-01 PROCEDURE — 36415 COLL VENOUS BLD VENIPUNCTURE: CPT

## 2023-05-01 PROCEDURE — 82746 ASSAY OF FOLIC ACID SERUM: CPT

## 2023-05-01 PROCEDURE — 93010 ELECTROCARDIOGRAM REPORT: CPT | Performed by: INTERNAL MEDICINE

## 2023-05-01 PROCEDURE — 85045 AUTOMATED RETICULOCYTE COUNT: CPT

## 2023-05-01 PROCEDURE — 83550 IRON BINDING TEST: CPT

## 2023-05-01 PROCEDURE — 83880 ASSAY OF NATRIURETIC PEPTIDE: CPT

## 2023-05-01 PROCEDURE — 80048 BASIC METABOLIC PNL TOTAL CA: CPT

## 2023-05-01 PROCEDURE — 82728 ASSAY OF FERRITIN: CPT

## 2023-05-01 PROCEDURE — 2580000003 HC RX 258: Performed by: INTERNAL MEDICINE

## 2023-05-01 RX ORDER — ACETAMINOPHEN 650 MG/1
650 SUPPOSITORY RECTAL EVERY 6 HOURS PRN
Status: DISCONTINUED | OUTPATIENT
Start: 2023-05-01 | End: 2023-05-09 | Stop reason: HOSPADM

## 2023-05-01 RX ORDER — PANTOPRAZOLE SODIUM 40 MG/1
40 TABLET, DELAYED RELEASE ORAL DAILY
Status: DISCONTINUED | OUTPATIENT
Start: 2023-05-01 | End: 2023-05-09 | Stop reason: HOSPADM

## 2023-05-01 RX ORDER — INSULIN LISPRO 100 [IU]/ML
0-4 INJECTION, SOLUTION INTRAVENOUS; SUBCUTANEOUS NIGHTLY
Status: DISCONTINUED | OUTPATIENT
Start: 2023-05-01 | End: 2023-05-09 | Stop reason: HOSPADM

## 2023-05-01 RX ORDER — LEVOTHYROXINE SODIUM 0.12 MG/1
125 TABLET ORAL DAILY
Status: DISCONTINUED | OUTPATIENT
Start: 2023-05-01 | End: 2023-05-09 | Stop reason: HOSPADM

## 2023-05-01 RX ORDER — CALCIUM CARBONATE 200(500)MG
1000 TABLET,CHEWABLE ORAL 3 TIMES DAILY PRN
Status: DISCONTINUED | OUTPATIENT
Start: 2023-05-01 | End: 2023-05-09 | Stop reason: HOSPADM

## 2023-05-01 RX ORDER — PIRFENIDONE 267 MG/1
1 TABLET, FILM COATED ORAL 3 TIMES DAILY
Status: DISCONTINUED | OUTPATIENT
Start: 2023-05-01 | End: 2023-05-02

## 2023-05-01 RX ORDER — PREDNISONE 20 MG/1
40 TABLET ORAL DAILY
Status: DISCONTINUED | OUTPATIENT
Start: 2023-05-01 | End: 2023-05-04

## 2023-05-01 RX ORDER — BENZONATATE 100 MG/1
200 CAPSULE ORAL 3 TIMES DAILY PRN
Status: DISCONTINUED | OUTPATIENT
Start: 2023-05-01 | End: 2023-05-09 | Stop reason: HOSPADM

## 2023-05-01 RX ORDER — HYDROXYZINE HYDROCHLORIDE 10 MG/1
10 TABLET, FILM COATED ORAL 3 TIMES DAILY PRN
Status: DISCONTINUED | OUTPATIENT
Start: 2023-05-01 | End: 2023-05-09 | Stop reason: HOSPADM

## 2023-05-01 RX ORDER — ONDANSETRON 4 MG/1
4 TABLET, ORALLY DISINTEGRATING ORAL EVERY 8 HOURS PRN
Status: DISCONTINUED | OUTPATIENT
Start: 2023-05-01 | End: 2023-05-09 | Stop reason: HOSPADM

## 2023-05-01 RX ORDER — ENOXAPARIN SODIUM 100 MG/ML
30 INJECTION SUBCUTANEOUS DAILY
Status: DISCONTINUED | OUTPATIENT
Start: 2023-05-02 | End: 2023-05-09 | Stop reason: HOSPADM

## 2023-05-01 RX ORDER — POTASSIUM CHLORIDE 7.45 MG/ML
10 INJECTION INTRAVENOUS PRN
Status: DISCONTINUED | OUTPATIENT
Start: 2023-05-01 | End: 2023-05-09 | Stop reason: HOSPADM

## 2023-05-01 RX ORDER — ONDANSETRON 2 MG/ML
4 INJECTION INTRAMUSCULAR; INTRAVENOUS EVERY 6 HOURS PRN
Status: DISCONTINUED | OUTPATIENT
Start: 2023-05-01 | End: 2023-05-09 | Stop reason: HOSPADM

## 2023-05-01 RX ORDER — LANOLIN ALCOHOL/MO/W.PET/CERES
3 CREAM (GRAM) TOPICAL NIGHTLY PRN
Status: DISCONTINUED | OUTPATIENT
Start: 2023-05-01 | End: 2023-05-09 | Stop reason: HOSPADM

## 2023-05-01 RX ORDER — TRAMADOL HYDROCHLORIDE 50 MG/1
100 TABLET ORAL 3 TIMES DAILY PRN
Status: DISCONTINUED | OUTPATIENT
Start: 2023-05-01 | End: 2023-05-09 | Stop reason: HOSPADM

## 2023-05-01 RX ORDER — SODIUM CHLORIDE 0.9 % (FLUSH) 0.9 %
10 SYRINGE (ML) INJECTION PRN
Status: DISCONTINUED | OUTPATIENT
Start: 2023-05-01 | End: 2023-05-09 | Stop reason: HOSPADM

## 2023-05-01 RX ORDER — SODIUM CHLORIDE 9 MG/ML
INJECTION, SOLUTION INTRAVENOUS PRN
Status: DISCONTINUED | OUTPATIENT
Start: 2023-05-01 | End: 2023-05-09 | Stop reason: HOSPADM

## 2023-05-01 RX ORDER — MAGNESIUM SULFATE 1 G/100ML
1000 INJECTION INTRAVENOUS PRN
Status: DISCONTINUED | OUTPATIENT
Start: 2023-05-01 | End: 2023-05-09 | Stop reason: HOSPADM

## 2023-05-01 RX ORDER — POTASSIUM CHLORIDE 20 MEQ/1
40 TABLET, EXTENDED RELEASE ORAL PRN
Status: DISCONTINUED | OUTPATIENT
Start: 2023-05-01 | End: 2023-05-09 | Stop reason: HOSPADM

## 2023-05-01 RX ORDER — DILTIAZEM HYDROCHLORIDE 240 MG/1
240 CAPSULE, COATED, EXTENDED RELEASE ORAL DAILY
Status: DISCONTINUED | OUTPATIENT
Start: 2023-05-01 | End: 2023-05-09 | Stop reason: HOSPADM

## 2023-05-01 RX ORDER — DEXTROSE MONOHYDRATE 100 MG/ML
INJECTION, SOLUTION INTRAVENOUS CONTINUOUS PRN
Status: DISCONTINUED | OUTPATIENT
Start: 2023-05-01 | End: 2023-05-09 | Stop reason: HOSPADM

## 2023-05-01 RX ORDER — LORAZEPAM 0.5 MG/1
0.5 TABLET ORAL EVERY 6 HOURS PRN
Status: DISCONTINUED | OUTPATIENT
Start: 2023-05-01 | End: 2023-05-09 | Stop reason: HOSPADM

## 2023-05-01 RX ORDER — MIRTAZAPINE 15 MG/1
30 TABLET, FILM COATED ORAL NIGHTLY
Status: DISCONTINUED | OUTPATIENT
Start: 2023-05-01 | End: 2023-05-09 | Stop reason: HOSPADM

## 2023-05-01 RX ORDER — ACETAMINOPHEN 325 MG/1
650 TABLET ORAL EVERY 6 HOURS PRN
Status: DISCONTINUED | OUTPATIENT
Start: 2023-05-01 | End: 2023-05-09 | Stop reason: HOSPADM

## 2023-05-01 RX ORDER — ENOXAPARIN SODIUM 100 MG/ML
40 INJECTION SUBCUTANEOUS DAILY
Status: DISCONTINUED | OUTPATIENT
Start: 2023-05-01 | End: 2023-05-01

## 2023-05-01 RX ORDER — INSULIN LISPRO 100 [IU]/ML
0-4 INJECTION, SOLUTION INTRAVENOUS; SUBCUTANEOUS
Status: DISCONTINUED | OUTPATIENT
Start: 2023-05-01 | End: 2023-05-09 | Stop reason: HOSPADM

## 2023-05-01 RX ORDER — ATORVASTATIN CALCIUM 40 MG/1
40 TABLET, FILM COATED ORAL DAILY
Status: DISCONTINUED | OUTPATIENT
Start: 2023-05-01 | End: 2023-05-09 | Stop reason: HOSPADM

## 2023-05-01 RX ADMIN — MIRTAZAPINE 30 MG: 15 TABLET, FILM COATED ORAL at 20:41

## 2023-05-01 RX ADMIN — ACETAMINOPHEN 650 MG: 325 TABLET ORAL at 17:53

## 2023-05-01 RX ADMIN — INSULIN LISPRO 1 UNITS: 100 INJECTION, SOLUTION INTRAVENOUS; SUBCUTANEOUS at 08:24

## 2023-05-01 RX ADMIN — LEVOTHYROXINE SODIUM 125 MCG: 0.12 TABLET ORAL at 05:54

## 2023-05-01 RX ADMIN — SERTRALINE 200 MG: 50 TABLET, FILM COATED ORAL at 10:43

## 2023-05-01 RX ADMIN — ATORVASTATIN CALCIUM 40 MG: 40 TABLET, FILM COATED ORAL at 10:19

## 2023-05-01 RX ADMIN — HYDROXYZINE HYDROCHLORIDE 10 MG: 10 TABLET ORAL at 23:30

## 2023-05-01 RX ADMIN — ENOXAPARIN SODIUM 40 MG: 100 INJECTION SUBCUTANEOUS at 10:19

## 2023-05-01 RX ADMIN — BENZONATATE 200 MG: 100 CAPSULE ORAL at 20:41

## 2023-05-01 RX ADMIN — PANTOPRAZOLE SODIUM 40 MG: 40 TABLET, DELAYED RELEASE ORAL at 10:18

## 2023-05-01 RX ADMIN — DILTIAZEM HYDROCHLORIDE 240 MG: 240 CAPSULE, COATED, EXTENDED RELEASE ORAL at 10:19

## 2023-05-01 RX ADMIN — PREDNISONE 40 MG: 20 TABLET ORAL at 10:18

## 2023-05-01 ASSESSMENT — PAIN SCALES - GENERAL
PAINLEVEL_OUTOF10: 3
PAINLEVEL_OUTOF10: 0

## 2023-05-01 ASSESSMENT — PAIN DESCRIPTION - LOCATION: LOCATION: HEAD

## 2023-05-01 NOTE — ED NOTES
Report called to nurse assuming inpatient care and responsibility for patient at [facility]. All questions answered per protocol. Nurse receiving report expresses no further concerns or questions. Transport eta 01:45.      Susana Zazueta RN  05/01/23 6675

## 2023-05-01 NOTE — ED NOTES
Patient in bed sleeping at this time. Patient has tolerated O2 weening well and maintained 97%+ O2 sat and has officially weened from 15L NRD O2 to 4L high flow NC O2 with humidification. Patient continues to maintain O2 saturation 98% at this time. Continuing to await transport to [facility].       Luis Obregon RN  05/01/23 6055

## 2023-05-01 NOTE — ED NOTES
Transport arrived to take patient to [address] [facility]. Patient continues to maintain 100% O2 saturation on 4L NC. Patient leaving this facility at this time to be admitted at [facility].       Shawn Davis RN  05/01/23 8875

## 2023-05-01 NOTE — ED NOTES
Call into [employer] who stated they were unable to get ahold of us, but are delayed and on the way.      Nas Quijano  05/01/23 5666

## 2023-05-01 NOTE — ED NOTES
Patient asked to use bedside commode. Patient supervision only assist. Patient asked to have O2 increased during position change and transition from bed to bedside commode. Patient O2 increased from 8L high flow NC O2 to 12L. Patient tolerated using bedside commode well with increased O2 and verbalized that during the movement. Patient put back in bed and slowly transitioned back down to 8L O2 to continue attempting to ween to sustainable O2 level. Patient denies any further concerns or complaints at this time. Patient VSS and awaiting transport to [facility].        Esperanza Recio RN  05/01/23 0228

## 2023-05-01 NOTE — ED PROVIDER NOTES
[facility] EMERGENCY DEPARTMENT      CHIEF COMPLAINT  Shortness of Breath (From home arrives via EMS for SOA; 54% upon arrival d/t EMS not having O2 turned on during transport of patient; patient wears 8-10L O2 at home regularly; patient longstanding hx of respiratory issues; 100% on 15L NRB at triage)       Melina Ford is a 77 y.o. female  who presents to the ED complaining of shortness of breath. History of COPD, , pulmonary fibrosis, chronically on 8 to 10 L O2 at home. Had a recent admission with similar symptoms discharged 9 days ago. Patient states her symptoms have been ongoing for months, but she has been more short of breath of the last few days. Denies any fevers or chills. Endorses occasional abdominal pain. Has a cough minimally productive. Was initially 54%, though did not have any oxygen flowing. She is 100% on 15 L nonrebreather. No other complaints, modifying factors or associated symptoms. I have reviewed the following from the nursing documentation. Past Medical History:   Diagnosis Date    A-fib ([facility] [address]     Anxiety     COPD (chronic obstructive pulmonary disease) ([facility] [address]     Cryptogenic organizing pneumonia ([facility] [address]     Depression     GERD (gastroesophageal reflux disease)     Hyperlipidemia     On home oxygen therapy     uses O2 3L prn    Rash     Thyroid disease     hypothyroidism     Past Surgical History:   Procedure Laterality Date    ARM SURGERY Left 3/2/2020    LEFT ULNAR NERVE DECOMPRESSION AT THE ELBOW performed by Tiffani Carbajal MD at [facility] N/A 6/27/2019    EBUS WF W/ANES.  performed by Lilli Wise MD at [place] [address]  6/27/2019    BRONCHOSCOPY/TRANSBRONCHIAL NEEDLE BIOPSY performed by Lilli Wise MD at [place] [address]  6/27/2019    BRONCHOSCOPY/TRANSBRONCHIAL LUNG BIOPSY performed by Lilli Wise MD at [place] [address]  6/27/2019    BRONCHOSCOPY

## 2023-05-02 ENCOUNTER — APPOINTMENT (OUTPATIENT)
Dept: CT IMAGING | Age: 68
DRG: 196 | End: 2023-05-02
Attending: INTERNAL MEDICINE
Payer: MEDICARE

## 2023-05-02 ENCOUNTER — TELEPHONE (OUTPATIENT)
Dept: FAMILY MEDICINE CLINIC | Age: 68
End: 2023-05-02

## 2023-05-02 LAB
ALBUMIN SERPL-MCNC: 3.1 G/DL (ref 3.4–5)
ALP SERPL-CCNC: 61 U/L (ref 40–129)
ALT SERPL-CCNC: 15 U/L (ref 10–40)
ANION GAP SERPL CALCULATED.3IONS-SCNC: 5 MMOL/L (ref 3–16)
AST SERPL-CCNC: 14 U/L (ref 15–37)
BASOPHILS # BLD: 0 K/UL (ref 0–0.2)
BASOPHILS NFR BLD: 0.1 %
BILIRUB DIRECT SERPL-MCNC: <0.2 MG/DL (ref 0–0.3)
BILIRUB INDIRECT SERPL-MCNC: ABNORMAL MG/DL (ref 0–1)
BILIRUB SERPL-MCNC: 0.3 MG/DL (ref 0–1)
BUN SERPL-MCNC: 21 MG/DL (ref 7–20)
CALCIUM SERPL-MCNC: 8.7 MG/DL (ref 8.3–10.6)
CHLORIDE SERPL-SCNC: 101 MMOL/L (ref 99–110)
CO2 SERPL-SCNC: 35 MMOL/L (ref 21–32)
CREAT SERPL-MCNC: <0.5 MG/DL (ref 0.6–1.2)
DEPRECATED RDW RBC AUTO: 16.8 % (ref 12.4–15.4)
EKG ATRIAL RATE: 138 BPM
EKG DIAGNOSIS: NORMAL
EKG Q-T INTERVAL: 316 MS
EKG QRS DURATION: 64 MS
EKG QTC CALCULATION (BAZETT): 457 MS
EKG R AXIS: 6 DEGREES
EKG T AXIS: -21 DEGREES
EKG VENTRICULAR RATE: 126 BPM
EOSINOPHIL # BLD: 0 K/UL (ref 0–0.6)
EOSINOPHIL NFR BLD: 0.2 %
GFR SERPLBLD CREATININE-BSD FMLA CKD-EPI: >60 ML/MIN/{1.73_M2}
GLUCOSE BLD-MCNC: 146 MG/DL (ref 70–99)
GLUCOSE BLD-MCNC: 228 MG/DL (ref 70–99)
GLUCOSE BLD-MCNC: 286 MG/DL (ref 70–99)
GLUCOSE BLD-MCNC: 93 MG/DL (ref 70–99)
GLUCOSE SERPL-MCNC: 98 MG/DL (ref 70–99)
HCT VFR BLD AUTO: 26.8 % (ref 36–48)
HGB BLD-MCNC: 8.4 G/DL (ref 12–16)
LV EF: 58 %
LVEF MODALITY: NORMAL
LYMPHOCYTES # BLD: 2.7 K/UL (ref 1–5.1)
LYMPHOCYTES NFR BLD: 19.9 %
MAGNESIUM SERPL-MCNC: 2 MG/DL (ref 1.8–2.4)
MCH RBC QN AUTO: 24.4 PG (ref 26–34)
MCHC RBC AUTO-ENTMCNC: 31.3 G/DL (ref 31–36)
MCV RBC AUTO: 77.8 FL (ref 80–100)
MONOCYTES # BLD: 0.9 K/UL (ref 0–1.3)
MONOCYTES NFR BLD: 6.4 %
NEUTROPHILS # BLD: 9.9 K/UL (ref 1.7–7.7)
NEUTROPHILS NFR BLD: 73.4 %
NT-PROBNP SERPL-MCNC: 492 PG/ML (ref 0–124)
PERFORMED ON: ABNORMAL
PERFORMED ON: NORMAL
PHOSPHATE SERPL-MCNC: 4.5 MG/DL (ref 2.5–4.9)
PLATELET # BLD AUTO: 172 K/UL (ref 135–450)
PMV BLD AUTO: 7 FL (ref 5–10.5)
POTASSIUM SERPL-SCNC: 3.6 MMOL/L (ref 3.5–5.1)
PROT SERPL-MCNC: 5.3 G/DL (ref 6.4–8.2)
RBC # BLD AUTO: 3.45 M/UL (ref 4–5.2)
SODIUM SERPL-SCNC: 141 MMOL/L (ref 136–145)
WBC # BLD AUTO: 13.5 K/UL (ref 4–11)

## 2023-05-02 PROCEDURE — 2060000000 HC ICU INTERMEDIATE R&B

## 2023-05-02 PROCEDURE — 94761 N-INVAS EAR/PLS OXIMETRY MLT: CPT

## 2023-05-02 PROCEDURE — 2500000003 HC RX 250 WO HCPCS: Performed by: NURSE PRACTITIONER

## 2023-05-02 PROCEDURE — 6370000000 HC RX 637 (ALT 250 FOR IP): Performed by: INTERNAL MEDICINE

## 2023-05-02 PROCEDURE — 99233 SBSQ HOSP IP/OBS HIGH 50: CPT | Performed by: INTERNAL MEDICINE

## 2023-05-02 PROCEDURE — 93005 ELECTROCARDIOGRAM TRACING: CPT | Performed by: INTERNAL MEDICINE

## 2023-05-02 PROCEDURE — 93306 TTE W/DOPPLER COMPLETE: CPT

## 2023-05-02 PROCEDURE — 6360000004 HC RX CONTRAST MEDICATION: Performed by: NURSE PRACTITIONER

## 2023-05-02 PROCEDURE — 99223 1ST HOSP IP/OBS HIGH 75: CPT | Performed by: INTERNAL MEDICINE

## 2023-05-02 PROCEDURE — 71260 CT THORAX DX C+: CPT

## 2023-05-02 PROCEDURE — 85025 COMPLETE CBC W/AUTO DIFF WBC: CPT

## 2023-05-02 PROCEDURE — 80048 BASIC METABOLIC PNL TOTAL CA: CPT

## 2023-05-02 PROCEDURE — 83880 ASSAY OF NATRIURETIC PEPTIDE: CPT

## 2023-05-02 PROCEDURE — 2580000003 HC RX 258: Performed by: NURSE PRACTITIONER

## 2023-05-02 PROCEDURE — 84100 ASSAY OF PHOSPHORUS: CPT

## 2023-05-02 PROCEDURE — 2700000000 HC OXYGEN THERAPY PER DAY

## 2023-05-02 PROCEDURE — 6360000002 HC RX W HCPCS: Performed by: INTERNAL MEDICINE

## 2023-05-02 PROCEDURE — 83735 ASSAY OF MAGNESIUM: CPT

## 2023-05-02 PROCEDURE — 80076 HEPATIC FUNCTION PANEL: CPT

## 2023-05-02 PROCEDURE — 93010 ELECTROCARDIOGRAM REPORT: CPT | Performed by: INTERNAL MEDICINE

## 2023-05-02 RX ORDER — PIRFENIDONE 267 MG/1
267 TABLET, FILM COATED ORAL 3 TIMES DAILY
Status: DISCONTINUED | OUTPATIENT
Start: 2023-05-02 | End: 2023-05-09 | Stop reason: HOSPADM

## 2023-05-02 RX ORDER — DILTIAZEM HYDROCHLORIDE 5 MG/ML
10 INJECTION INTRAVENOUS ONCE
Status: CANCELLED | OUTPATIENT
Start: 2023-05-02 | End: 2023-05-02

## 2023-05-02 RX ORDER — NADOLOL 20 MG/1
20 TABLET ORAL DAILY
Status: DISCONTINUED | OUTPATIENT
Start: 2023-05-02 | End: 2023-05-02

## 2023-05-02 RX ORDER — NADOLOL 20 MG/1
40 TABLET ORAL DAILY
Status: DISCONTINUED | OUTPATIENT
Start: 2023-05-03 | End: 2023-05-09 | Stop reason: HOSPADM

## 2023-05-02 RX ORDER — DILTIAZEM HYDROCHLORIDE 5 MG/ML
10 INJECTION INTRAVENOUS ONCE
Status: COMPLETED | OUTPATIENT
Start: 2023-05-02 | End: 2023-05-02

## 2023-05-02 RX ADMIN — PREDNISONE 40 MG: 20 TABLET ORAL at 10:44

## 2023-05-02 RX ADMIN — IOPAMIDOL 75 ML: 755 INJECTION, SOLUTION INTRAVENOUS at 18:42

## 2023-05-02 RX ADMIN — INSULIN LISPRO 2 UNITS: 100 INJECTION, SOLUTION INTRAVENOUS; SUBCUTANEOUS at 17:12

## 2023-05-02 RX ADMIN — PIRFENIDONE 267 MG: 267 TABLET, FILM COATED ORAL at 10:51

## 2023-05-02 RX ADMIN — NADOLOL 20 MG: 20 TABLET ORAL at 17:11

## 2023-05-02 RX ADMIN — SERTRALINE 200 MG: 50 TABLET, FILM COATED ORAL at 10:44

## 2023-05-02 RX ADMIN — ATORVASTATIN CALCIUM 40 MG: 40 TABLET, FILM COATED ORAL at 10:44

## 2023-05-02 RX ADMIN — ENOXAPARIN SODIUM 30 MG: 100 INJECTION SUBCUTANEOUS at 10:44

## 2023-05-02 RX ADMIN — PIRFENIDONE 267 MG: 267 TABLET, FILM COATED ORAL at 21:40

## 2023-05-02 RX ADMIN — PANTOPRAZOLE SODIUM 40 MG: 40 TABLET, DELAYED RELEASE ORAL at 10:44

## 2023-05-02 RX ADMIN — ACETAMINOPHEN 650 MG: 325 TABLET ORAL at 05:18

## 2023-05-02 RX ADMIN — MIRTAZAPINE 30 MG: 15 TABLET, FILM COATED ORAL at 21:39

## 2023-05-02 RX ADMIN — LEVOTHYROXINE SODIUM 125 MCG: 0.12 TABLET ORAL at 05:15

## 2023-05-02 RX ADMIN — DILTIAZEM HYDROCHLORIDE 10 MG: 5 INJECTION INTRAVENOUS at 11:27

## 2023-05-02 RX ADMIN — DILTIAZEM HYDROCHLORIDE 240 MG: 240 CAPSULE, COATED, EXTENDED RELEASE ORAL at 10:44

## 2023-05-02 RX ADMIN — HYDROXYZINE HYDROCHLORIDE 10 MG: 10 TABLET ORAL at 13:59

## 2023-05-02 RX ADMIN — SODIUM CHLORIDE 5 MG/HR: 900 INJECTION, SOLUTION INTRAVENOUS at 13:56

## 2023-05-02 RX ADMIN — PIRFENIDONE 267 MG: 267 TABLET, FILM COATED ORAL at 17:11

## 2023-05-02 ASSESSMENT — PAIN SCALES - GENERAL
PAINLEVEL_OUTOF10: 0

## 2023-05-03 LAB
ANION GAP SERPL CALCULATED.3IONS-SCNC: 8 MMOL/L (ref 3–16)
BASOPHILS # BLD: 0 K/UL (ref 0–0.2)
BASOPHILS NFR BLD: 0.3 %
BUN SERPL-MCNC: 15 MG/DL (ref 7–20)
CALCIUM SERPL-MCNC: 8.2 MG/DL (ref 8.3–10.6)
CHLORIDE SERPL-SCNC: 102 MMOL/L (ref 99–110)
CO2 SERPL-SCNC: 32 MMOL/L (ref 21–32)
CREAT SERPL-MCNC: <0.5 MG/DL (ref 0.6–1.2)
DEPRECATED RDW RBC AUTO: 16.7 % (ref 12.4–15.4)
EOSINOPHIL # BLD: 0.1 K/UL (ref 0–0.6)
EOSINOPHIL NFR BLD: 0.9 %
FERRITIN SERPL IA-MCNC: 20.8 NG/ML (ref 15–150)
GFR SERPLBLD CREATININE-BSD FMLA CKD-EPI: >60 ML/MIN/{1.73_M2}
GLUCOSE BLD-MCNC: 126 MG/DL (ref 70–99)
GLUCOSE BLD-MCNC: 128 MG/DL (ref 70–99)
GLUCOSE BLD-MCNC: 175 MG/DL (ref 70–99)
GLUCOSE BLD-MCNC: 205 MG/DL (ref 70–99)
GLUCOSE SERPL-MCNC: 104 MG/DL (ref 70–99)
HCT VFR BLD AUTO: 29.4 % (ref 36–48)
HGB BLD-MCNC: 8.9 G/DL (ref 12–16)
IRON SATN MFR SERPL: 7 % (ref 15–50)
IRON SERPL-MCNC: 34 UG/DL (ref 37–145)
LYMPHOCYTES # BLD: 2.4 K/UL (ref 1–5.1)
LYMPHOCYTES NFR BLD: 18.1 %
MAGNESIUM SERPL-MCNC: 1.9 MG/DL (ref 1.8–2.4)
MCH RBC QN AUTO: 24.1 PG (ref 26–34)
MCHC RBC AUTO-ENTMCNC: 30.2 G/DL (ref 31–36)
MCV RBC AUTO: 79.9 FL (ref 80–100)
MONOCYTES # BLD: 0.9 K/UL (ref 0–1.3)
MONOCYTES NFR BLD: 7.1 %
NEUTROPHILS # BLD: 9.8 K/UL (ref 1.7–7.7)
NEUTROPHILS NFR BLD: 73.6 %
PERFORMED ON: ABNORMAL
PLATELET # BLD AUTO: 170 K/UL (ref 135–450)
PMV BLD AUTO: 7.4 FL (ref 5–10.5)
POTASSIUM SERPL-SCNC: 3.2 MMOL/L (ref 3.5–5.1)
RBC # BLD AUTO: 3.67 M/UL (ref 4–5.2)
SODIUM SERPL-SCNC: 142 MMOL/L (ref 136–145)
TIBC SERPL-MCNC: 461 UG/DL (ref 260–445)
TSH SERPL DL<=0.005 MIU/L-ACNC: 0.2 UIU/ML (ref 0.27–4.2)
WBC # BLD AUTO: 13.3 K/UL (ref 4–11)

## 2023-05-03 PROCEDURE — 6370000000 HC RX 637 (ALT 250 FOR IP): Performed by: INTERNAL MEDICINE

## 2023-05-03 PROCEDURE — 99232 SBSQ HOSP IP/OBS MODERATE 35: CPT | Performed by: NURSE PRACTITIONER

## 2023-05-03 PROCEDURE — 80048 BASIC METABOLIC PNL TOTAL CA: CPT

## 2023-05-03 PROCEDURE — 85025 COMPLETE CBC W/AUTO DIFF WBC: CPT

## 2023-05-03 PROCEDURE — 83735 ASSAY OF MAGNESIUM: CPT

## 2023-05-03 PROCEDURE — 99233 SBSQ HOSP IP/OBS HIGH 50: CPT | Performed by: INTERNAL MEDICINE

## 2023-05-03 PROCEDURE — 2580000003 HC RX 258: Performed by: INTERNAL MEDICINE

## 2023-05-03 PROCEDURE — 2700000000 HC OXYGEN THERAPY PER DAY

## 2023-05-03 PROCEDURE — 36415 COLL VENOUS BLD VENIPUNCTURE: CPT

## 2023-05-03 PROCEDURE — 6360000002 HC RX W HCPCS: Performed by: INTERNAL MEDICINE

## 2023-05-03 PROCEDURE — 2060000000 HC ICU INTERMEDIATE R&B

## 2023-05-03 PROCEDURE — 94761 N-INVAS EAR/PLS OXIMETRY MLT: CPT

## 2023-05-03 RX ADMIN — Medication 3 MG: at 01:19

## 2023-05-03 RX ADMIN — LEVOTHYROXINE SODIUM 125 MCG: 0.12 TABLET ORAL at 09:07

## 2023-05-03 RX ADMIN — MIRTAZAPINE 30 MG: 15 TABLET, FILM COATED ORAL at 21:14

## 2023-05-03 RX ADMIN — PIRFENIDONE 267 MG: 267 TABLET, FILM COATED ORAL at 15:06

## 2023-05-03 RX ADMIN — Medication 10 ML: at 09:02

## 2023-05-03 RX ADMIN — ENOXAPARIN SODIUM 30 MG: 100 INJECTION SUBCUTANEOUS at 09:01

## 2023-05-03 RX ADMIN — PREDNISONE 40 MG: 20 TABLET ORAL at 09:01

## 2023-05-03 RX ADMIN — PIRFENIDONE 267 MG: 267 TABLET, FILM COATED ORAL at 21:14

## 2023-05-03 RX ADMIN — BENZONATATE 200 MG: 100 CAPSULE ORAL at 21:14

## 2023-05-03 RX ADMIN — LORAZEPAM 0.5 MG: 0.5 TABLET ORAL at 01:19

## 2023-05-03 RX ADMIN — LORAZEPAM 0.5 MG: 0.5 TABLET ORAL at 21:14

## 2023-05-03 RX ADMIN — ATORVASTATIN CALCIUM 40 MG: 40 TABLET, FILM COATED ORAL at 09:02

## 2023-05-03 RX ADMIN — NADOLOL 40 MG: 20 TABLET ORAL at 09:01

## 2023-05-03 RX ADMIN — SERTRALINE 200 MG: 50 TABLET, FILM COATED ORAL at 09:01

## 2023-05-03 RX ADMIN — INSULIN LISPRO 1 UNITS: 100 INJECTION, SOLUTION INTRAVENOUS; SUBCUTANEOUS at 17:59

## 2023-05-03 RX ADMIN — PANTOPRAZOLE SODIUM 40 MG: 40 TABLET, DELAYED RELEASE ORAL at 09:01

## 2023-05-03 RX ADMIN — DILTIAZEM HYDROCHLORIDE 240 MG: 240 CAPSULE, COATED, EXTENDED RELEASE ORAL at 09:02

## 2023-05-03 RX ADMIN — PIRFENIDONE 267 MG: 267 TABLET, FILM COATED ORAL at 09:07

## 2023-05-03 ASSESSMENT — PAIN SCALES - GENERAL
PAINLEVEL_OUTOF10: 0

## 2023-05-04 PROBLEM — J96.21 ACUTE ON CHRONIC RESPIRATORY FAILURE WITH HYPOXIA (HCC): Status: ACTIVE | Noted: 2023-05-04

## 2023-05-04 LAB
GLUCOSE BLD-MCNC: 145 MG/DL (ref 70–99)
GLUCOSE BLD-MCNC: 146 MG/DL (ref 70–99)
GLUCOSE BLD-MCNC: 166 MG/DL (ref 70–99)
GLUCOSE BLD-MCNC: 262 MG/DL (ref 70–99)
PERFORMED ON: ABNORMAL

## 2023-05-04 PROCEDURE — 6360000002 HC RX W HCPCS: Performed by: INTERNAL MEDICINE

## 2023-05-04 PROCEDURE — 2580000003 HC RX 258: Performed by: INTERNAL MEDICINE

## 2023-05-04 PROCEDURE — 99233 SBSQ HOSP IP/OBS HIGH 50: CPT | Performed by: INTERNAL MEDICINE

## 2023-05-04 PROCEDURE — 2060000000 HC ICU INTERMEDIATE R&B

## 2023-05-04 PROCEDURE — 6370000000 HC RX 637 (ALT 250 FOR IP): Performed by: INTERNAL MEDICINE

## 2023-05-04 RX ADMIN — SERTRALINE 200 MG: 50 TABLET, FILM COATED ORAL at 10:03

## 2023-05-04 RX ADMIN — ENOXAPARIN SODIUM 30 MG: 100 INJECTION SUBCUTANEOUS at 10:03

## 2023-05-04 RX ADMIN — ATORVASTATIN CALCIUM 40 MG: 40 TABLET, FILM COATED ORAL at 10:03

## 2023-05-04 RX ADMIN — PIRFENIDONE 267 MG: 267 TABLET, FILM COATED ORAL at 20:04

## 2023-05-04 RX ADMIN — DILTIAZEM HYDROCHLORIDE 240 MG: 240 CAPSULE, COATED, EXTENDED RELEASE ORAL at 10:03

## 2023-05-04 RX ADMIN — PANTOPRAZOLE SODIUM 40 MG: 40 TABLET, DELAYED RELEASE ORAL at 10:03

## 2023-05-04 RX ADMIN — MIRTAZAPINE 30 MG: 15 TABLET, FILM COATED ORAL at 20:04

## 2023-05-04 RX ADMIN — HYDROXYZINE HYDROCHLORIDE 10 MG: 10 TABLET ORAL at 20:04

## 2023-05-04 RX ADMIN — PREDNISONE 40 MG: 20 TABLET ORAL at 10:02

## 2023-05-04 RX ADMIN — PIRFENIDONE 267 MG: 267 TABLET, FILM COATED ORAL at 16:14

## 2023-05-04 RX ADMIN — BENZONATATE 200 MG: 100 CAPSULE ORAL at 20:04

## 2023-05-04 RX ADMIN — LORAZEPAM 0.5 MG: 0.5 TABLET ORAL at 18:08

## 2023-05-04 RX ADMIN — NADOLOL 40 MG: 20 TABLET ORAL at 10:02

## 2023-05-04 RX ADMIN — Medication 10 ML: at 20:04

## 2023-05-04 RX ADMIN — LEVOTHYROXINE SODIUM 125 MCG: 0.12 TABLET ORAL at 05:57

## 2023-05-04 RX ADMIN — PIRFENIDONE 267 MG: 267 TABLET, FILM COATED ORAL at 10:04

## 2023-05-04 RX ADMIN — INSULIN LISPRO 2 UNITS: 100 INJECTION, SOLUTION INTRAVENOUS; SUBCUTANEOUS at 17:36

## 2023-05-04 ASSESSMENT — PAIN SCALES - GENERAL
PAINLEVEL_OUTOF10: 0

## 2023-05-05 LAB
GLUCOSE BLD-MCNC: 116 MG/DL (ref 70–99)
GLUCOSE BLD-MCNC: 185 MG/DL (ref 70–99)
GLUCOSE BLD-MCNC: 286 MG/DL (ref 70–99)
GLUCOSE BLD-MCNC: 88 MG/DL (ref 70–99)
PERFORMED ON: ABNORMAL
PERFORMED ON: NORMAL

## 2023-05-05 PROCEDURE — 2060000000 HC ICU INTERMEDIATE R&B

## 2023-05-05 PROCEDURE — 6370000000 HC RX 637 (ALT 250 FOR IP): Performed by: INTERNAL MEDICINE

## 2023-05-05 PROCEDURE — 6360000002 HC RX W HCPCS: Performed by: INTERNAL MEDICINE

## 2023-05-05 PROCEDURE — 2700000000 HC OXYGEN THERAPY PER DAY

## 2023-05-05 PROCEDURE — 94761 N-INVAS EAR/PLS OXIMETRY MLT: CPT

## 2023-05-05 PROCEDURE — 99233 SBSQ HOSP IP/OBS HIGH 50: CPT | Performed by: INTERNAL MEDICINE

## 2023-05-05 RX ADMIN — INSULIN LISPRO 2 UNITS: 100 INJECTION, SOLUTION INTRAVENOUS; SUBCUTANEOUS at 17:05

## 2023-05-05 RX ADMIN — BENZONATATE 200 MG: 100 CAPSULE ORAL at 21:05

## 2023-05-05 RX ADMIN — LORAZEPAM 0.5 MG: 0.5 TABLET ORAL at 17:08

## 2023-05-05 RX ADMIN — PIRFENIDONE 267 MG: 267 TABLET, FILM COATED ORAL at 09:22

## 2023-05-05 RX ADMIN — NADOLOL 40 MG: 20 TABLET ORAL at 09:20

## 2023-05-05 RX ADMIN — DILTIAZEM HYDROCHLORIDE 240 MG: 240 CAPSULE, COATED, EXTENDED RELEASE ORAL at 09:20

## 2023-05-05 RX ADMIN — ENOXAPARIN SODIUM 30 MG: 100 INJECTION SUBCUTANEOUS at 09:21

## 2023-05-05 RX ADMIN — PIRFENIDONE 267 MG: 267 TABLET, FILM COATED ORAL at 17:03

## 2023-05-05 RX ADMIN — PANTOPRAZOLE SODIUM 40 MG: 40 TABLET, DELAYED RELEASE ORAL at 09:21

## 2023-05-05 RX ADMIN — ATORVASTATIN CALCIUM 40 MG: 40 TABLET, FILM COATED ORAL at 09:20

## 2023-05-05 RX ADMIN — MIRTAZAPINE 30 MG: 15 TABLET, FILM COATED ORAL at 20:59

## 2023-05-05 RX ADMIN — LEVOTHYROXINE SODIUM 125 MCG: 0.12 TABLET ORAL at 05:31

## 2023-05-05 RX ADMIN — PREDNISONE 30 MG: 20 TABLET ORAL at 09:20

## 2023-05-05 RX ADMIN — ONDANSETRON 4 MG: 2 INJECTION INTRAMUSCULAR; INTRAVENOUS at 10:28

## 2023-05-05 RX ADMIN — SERTRALINE 200 MG: 50 TABLET, FILM COATED ORAL at 09:20

## 2023-05-05 RX ADMIN — PIRFENIDONE 267 MG: 267 TABLET, FILM COATED ORAL at 21:00

## 2023-05-05 ASSESSMENT — PAIN SCALES - GENERAL
PAINLEVEL_OUTOF10: 0

## 2023-05-06 LAB
GLUCOSE BLD-MCNC: 113 MG/DL (ref 70–99)
GLUCOSE BLD-MCNC: 145 MG/DL (ref 70–99)
GLUCOSE BLD-MCNC: 157 MG/DL (ref 70–99)
GLUCOSE BLD-MCNC: 162 MG/DL (ref 70–99)
GLUCOSE BLD-MCNC: 209 MG/DL (ref 70–99)
PERFORMED ON: ABNORMAL

## 2023-05-06 PROCEDURE — 6360000002 HC RX W HCPCS: Performed by: INTERNAL MEDICINE

## 2023-05-06 PROCEDURE — 2700000000 HC OXYGEN THERAPY PER DAY

## 2023-05-06 PROCEDURE — 99233 SBSQ HOSP IP/OBS HIGH 50: CPT | Performed by: INTERNAL MEDICINE

## 2023-05-06 PROCEDURE — 6370000000 HC RX 637 (ALT 250 FOR IP): Performed by: INTERNAL MEDICINE

## 2023-05-06 PROCEDURE — 2060000000 HC ICU INTERMEDIATE R&B

## 2023-05-06 PROCEDURE — 94761 N-INVAS EAR/PLS OXIMETRY MLT: CPT

## 2023-05-06 PROCEDURE — 2580000003 HC RX 258: Performed by: INTERNAL MEDICINE

## 2023-05-06 RX ADMIN — PIRFENIDONE 267 MG: 267 TABLET, FILM COATED ORAL at 08:37

## 2023-05-06 RX ADMIN — BENZONATATE 200 MG: 100 CAPSULE ORAL at 20:53

## 2023-05-06 RX ADMIN — DILTIAZEM HYDROCHLORIDE 240 MG: 240 CAPSULE, COATED, EXTENDED RELEASE ORAL at 08:32

## 2023-05-06 RX ADMIN — MIRTAZAPINE 30 MG: 15 TABLET, FILM COATED ORAL at 20:53

## 2023-05-06 RX ADMIN — ENOXAPARIN SODIUM 30 MG: 100 INJECTION SUBCUTANEOUS at 08:33

## 2023-05-06 RX ADMIN — PIRFENIDONE 267 MG: 267 TABLET, FILM COATED ORAL at 20:55

## 2023-05-06 RX ADMIN — ONDANSETRON 4 MG: 4 TABLET, ORALLY DISINTEGRATING ORAL at 14:27

## 2023-05-06 RX ADMIN — Medication 10 ML: at 08:33

## 2023-05-06 RX ADMIN — PREDNISONE 30 MG: 20 TABLET ORAL at 08:32

## 2023-05-06 RX ADMIN — SERTRALINE 200 MG: 50 TABLET, FILM COATED ORAL at 08:36

## 2023-05-06 RX ADMIN — LEVOTHYROXINE SODIUM 125 MCG: 0.12 TABLET ORAL at 08:32

## 2023-05-06 RX ADMIN — ATORVASTATIN CALCIUM 40 MG: 40 TABLET, FILM COATED ORAL at 08:32

## 2023-05-06 RX ADMIN — NADOLOL 40 MG: 20 TABLET ORAL at 08:32

## 2023-05-06 RX ADMIN — PANTOPRAZOLE SODIUM 40 MG: 40 TABLET, DELAYED RELEASE ORAL at 08:32

## 2023-05-06 RX ADMIN — LORAZEPAM 0.5 MG: 0.5 TABLET ORAL at 20:53

## 2023-05-06 ASSESSMENT — PAIN SCALES - GENERAL
PAINLEVEL_OUTOF10: 0
PAINLEVEL_OUTOF10: 0

## 2023-05-07 LAB
GLUCOSE BLD-MCNC: 105 MG/DL (ref 70–99)
GLUCOSE BLD-MCNC: 140 MG/DL (ref 70–99)
GLUCOSE BLD-MCNC: 160 MG/DL (ref 70–99)
GLUCOSE BLD-MCNC: 216 MG/DL (ref 70–99)
PERFORMED ON: ABNORMAL

## 2023-05-07 PROCEDURE — 6370000000 HC RX 637 (ALT 250 FOR IP): Performed by: INTERNAL MEDICINE

## 2023-05-07 PROCEDURE — 94761 N-INVAS EAR/PLS OXIMETRY MLT: CPT

## 2023-05-07 PROCEDURE — 2060000000 HC ICU INTERMEDIATE R&B

## 2023-05-07 PROCEDURE — 2700000000 HC OXYGEN THERAPY PER DAY

## 2023-05-07 PROCEDURE — 6360000002 HC RX W HCPCS: Performed by: INTERNAL MEDICINE

## 2023-05-07 RX ADMIN — NADOLOL 40 MG: 20 TABLET ORAL at 09:41

## 2023-05-07 RX ADMIN — INSULIN LISPRO 1 UNITS: 100 INJECTION, SOLUTION INTRAVENOUS; SUBCUTANEOUS at 17:35

## 2023-05-07 RX ADMIN — PIRFENIDONE 267 MG: 267 TABLET, FILM COATED ORAL at 09:43

## 2023-05-07 RX ADMIN — PIRFENIDONE 267 MG: 267 TABLET, FILM COATED ORAL at 14:55

## 2023-05-07 RX ADMIN — SERTRALINE 200 MG: 50 TABLET, FILM COATED ORAL at 09:42

## 2023-05-07 RX ADMIN — PANTOPRAZOLE SODIUM 40 MG: 40 TABLET, DELAYED RELEASE ORAL at 09:42

## 2023-05-07 RX ADMIN — ENOXAPARIN SODIUM 30 MG: 100 INJECTION SUBCUTANEOUS at 09:42

## 2023-05-07 RX ADMIN — ATORVASTATIN CALCIUM 40 MG: 40 TABLET, FILM COATED ORAL at 09:41

## 2023-05-07 RX ADMIN — PREDNISONE 30 MG: 20 TABLET ORAL at 09:41

## 2023-05-07 RX ADMIN — PIRFENIDONE 267 MG: 267 TABLET, FILM COATED ORAL at 20:47

## 2023-05-07 RX ADMIN — BENZONATATE 200 MG: 100 CAPSULE ORAL at 15:57

## 2023-05-07 RX ADMIN — LEVOTHYROXINE SODIUM 125 MCG: 0.12 TABLET ORAL at 06:02

## 2023-05-07 RX ADMIN — MIRTAZAPINE 30 MG: 15 TABLET, FILM COATED ORAL at 20:46

## 2023-05-07 RX ADMIN — LORAZEPAM 0.5 MG: 0.5 TABLET ORAL at 15:52

## 2023-05-07 RX ADMIN — DILTIAZEM HYDROCHLORIDE 240 MG: 240 CAPSULE, COATED, EXTENDED RELEASE ORAL at 09:41

## 2023-05-07 ASSESSMENT — PAIN SCALES - GENERAL: PAINLEVEL_OUTOF10: 0

## 2023-05-08 ENCOUNTER — TELEPHONE (OUTPATIENT)
Dept: FAMILY MEDICINE CLINIC | Age: 68
End: 2023-05-08

## 2023-05-08 LAB
GLUCOSE BLD-MCNC: 110 MG/DL (ref 70–99)
GLUCOSE BLD-MCNC: 235 MG/DL (ref 70–99)
GLUCOSE BLD-MCNC: 236 MG/DL (ref 70–99)
GLUCOSE BLD-MCNC: 242 MG/DL (ref 70–99)
PERFORMED ON: ABNORMAL

## 2023-05-08 PROCEDURE — 6370000000 HC RX 637 (ALT 250 FOR IP): Performed by: INTERNAL MEDICINE

## 2023-05-08 PROCEDURE — 2060000000 HC ICU INTERMEDIATE R&B

## 2023-05-08 PROCEDURE — 94761 N-INVAS EAR/PLS OXIMETRY MLT: CPT

## 2023-05-08 PROCEDURE — 6360000002 HC RX W HCPCS: Performed by: INTERNAL MEDICINE

## 2023-05-08 PROCEDURE — 2700000000 HC OXYGEN THERAPY PER DAY

## 2023-05-08 RX ADMIN — LORAZEPAM 0.5 MG: 0.5 TABLET ORAL at 03:08

## 2023-05-08 RX ADMIN — ENOXAPARIN SODIUM 30 MG: 100 INJECTION SUBCUTANEOUS at 08:55

## 2023-05-08 RX ADMIN — ATORVASTATIN CALCIUM 40 MG: 40 TABLET, FILM COATED ORAL at 08:55

## 2023-05-08 RX ADMIN — ACETAMINOPHEN 650 MG: 325 TABLET ORAL at 03:05

## 2023-05-08 RX ADMIN — MIRTAZAPINE 30 MG: 15 TABLET, FILM COATED ORAL at 20:19

## 2023-05-08 RX ADMIN — NADOLOL 40 MG: 20 TABLET ORAL at 08:55

## 2023-05-08 RX ADMIN — PREDNISONE 30 MG: 20 TABLET ORAL at 08:54

## 2023-05-08 RX ADMIN — PANTOPRAZOLE SODIUM 40 MG: 40 TABLET, DELAYED RELEASE ORAL at 08:54

## 2023-05-08 RX ADMIN — INSULIN LISPRO 1 UNITS: 100 INJECTION, SOLUTION INTRAVENOUS; SUBCUTANEOUS at 12:20

## 2023-05-08 RX ADMIN — LORAZEPAM 0.5 MG: 0.5 TABLET ORAL at 20:24

## 2023-05-08 RX ADMIN — SERTRALINE 200 MG: 50 TABLET, FILM COATED ORAL at 08:55

## 2023-05-08 RX ADMIN — LEVOTHYROXINE SODIUM 125 MCG: 0.12 TABLET ORAL at 05:09

## 2023-05-08 RX ADMIN — PIRFENIDONE 267 MG: 267 TABLET, FILM COATED ORAL at 08:56

## 2023-05-08 RX ADMIN — DILTIAZEM HYDROCHLORIDE 240 MG: 240 CAPSULE, COATED, EXTENDED RELEASE ORAL at 08:54

## 2023-05-08 ASSESSMENT — PAIN DESCRIPTION - DESCRIPTORS: DESCRIPTORS: ACHING

## 2023-05-08 ASSESSMENT — PAIN DESCRIPTION - LOCATION: LOCATION: HEAD

## 2023-05-08 ASSESSMENT — PAIN SCALES - GENERAL: PAINLEVEL_OUTOF10: 3

## 2023-05-08 NOTE — PLAN OF CARE
Problem: Chronic Conditions and Co-morbidities  Goal: Patient's chronic conditions and co-morbidity symptoms are monitored and maintained or improved  Outcome: Completed     Problem: Discharge Planning  Goal: Discharge to home or other facility with appropriate resources  Outcome: Completed     Problem: Safety - Adult  Goal: Free from fall injury  Outcome: Completed     Problem: ABCDS Injury Assessment  Goal: Absence of physical injury  Outcome: Completed     Problem: Respiratory - Adult  Goal: Achieves optimal ventilation and oxygenation  Outcome: Completed     Problem: Skin/Tissue Integrity  Goal: Absence of new skin breakdown  Description: 1. Monitor for areas of redness and/or skin breakdown  2. Assess vascular access sites hourly  3. Every 4-6 hours minimum:  Change oxygen saturation probe site  4. Every 4-6 hours:  If on nasal continuous positive airway pressure, respiratory therapy assess nares and determine need for appliance change or resting period.   Outcome: Completed

## 2023-05-08 NOTE — CARE COORDINATION
CASE MANAGEMENT DISCHARGE SUMMARY      Discharge to: return home with family. [facility] through [employer] [facility]. [employer] for [employer] services. IMM given: 05/08/23    New Durable Medical Equipment ordered/agency: [facility] for home o2. Pt has all needed home O2 equipment as arranged between Pt spouse and Deysi at [facility]. Transportation: private vehicle       Confirmed discharge plan with: nursing and MD     Patient: yes     Family:  yes- message left for Pt spouse that she is ready for d/c. No family at bedside at this time. Facility/Agency, name:  [facility] faxed to [employer] @ [phone]. Pulled from [employer] per [place].

## 2023-05-08 NOTE — DISCHARGE INSTR - COC
Level: 3  Last Weight:   Wt Readings from Last 1 Encounters:   05/07/23 108 lb 1.6 oz (49 kg)     Mental Status:  {IP PT MENTAL STATUS:20030}    IV Access:  { [facility] IV ACCESS:713549200}    Nursing Mobility/ADLs:  Walking   {P DME EEMR:786375911}  Transfer  {P DME XYQP:865552712}  Bathing  {CHP DME IRJR:796556172}  Dressing  {CHP DME IRDS:290016390}  Toileting  {P DME QBLF:286881764}  Feeding  {[facility] DME YSSS:765815697}  Med Admin  {[facility] DME SGRQ:825420584}  Med Delivery   { [facility] MED Delivery:779921084}    Wound Care Documentation and Therapy:        Elimination:  Continence: Bowel: {YES / AP:48744}  Bladder: {YES / HN:47983}  Urinary Catheter: {Urinary Catheter:301219142}   Colostomy/Ileostomy/Ileal Conduit: {YES / OJ:60356}       Date of Last BM: ***    Intake/Output Summary (Last 24 hours) at 5/8/2023 1408  Last data filed at 5/8/2023 0309  Gross per 24 hour   Intake 1010 ml   Output 1400 ml   Net -390 ml     I/O last 3 completed shifts: In: 7528 [P.O.:1290]  Out: 0 [Urine:1400]    Safety Concerns:     [employer] Safety Concerns:136744544}    Impairments/Disabilities:      [employer] Impairments/Disabilities:387610876}    Nutrition Therapy:  Current Nutrition Therapy:   - Oral Diet:  General    Routes of Feeding: Oral  Liquids: No Restrictions  Daily Fluid Restriction: no  Last Modified Barium Swallow with Video (Video Swallowing Test): not done    Treatments at the Time of Hospital Discharge:   Respiratory Treatments: 3-4 L NC  Oxygen Therapy:  is not on home oxygen therapy.   Ventilator:    - No ventilator support    Rehab Therapies: Physical Therapy  Weight Bearing Status/Restrictions: No weight bearing restrictions  Other Medical Equipment (for information only, NOT a DME order):  oxygen at home    Patient's personal belongings (please select all that are sent with patient):  {[facility] DME Belongings:382050942}    RN SIGNATURE:  {Esignature:922997138}    CASE MANAGEMENT/SOCIAL WORK SECTION    Inpatient Status

## 2023-05-08 NOTE — DISCHARGE INSTR - DIET

## 2023-05-09 VITALS
BODY MASS INDEX: 19.15 KG/M2 | HEIGHT: 63 IN | OXYGEN SATURATION: 100 % | SYSTOLIC BLOOD PRESSURE: 113 MMHG | TEMPERATURE: 97.9 F | DIASTOLIC BLOOD PRESSURE: 70 MMHG | HEART RATE: 79 BPM | RESPIRATION RATE: 16 BRPM | WEIGHT: 108.1 LBS

## 2023-05-09 LAB
GLUCOSE BLD-MCNC: 110 MG/DL (ref 70–99)
GLUCOSE BLD-MCNC: 155 MG/DL (ref 70–99)
PERFORMED ON: ABNORMAL
PERFORMED ON: ABNORMAL

## 2023-05-09 PROCEDURE — 6370000000 HC RX 637 (ALT 250 FOR IP): Performed by: INTERNAL MEDICINE

## 2023-05-09 PROCEDURE — 2700000000 HC OXYGEN THERAPY PER DAY

## 2023-05-09 PROCEDURE — 6360000002 HC RX W HCPCS: Performed by: INTERNAL MEDICINE

## 2023-05-09 PROCEDURE — 94761 N-INVAS EAR/PLS OXIMETRY MLT: CPT

## 2023-05-09 RX ADMIN — PREDNISONE 30 MG: 20 TABLET ORAL at 09:51

## 2023-05-09 RX ADMIN — PANTOPRAZOLE SODIUM 40 MG: 40 TABLET, DELAYED RELEASE ORAL at 09:51

## 2023-05-09 RX ADMIN — NADOLOL 40 MG: 20 TABLET ORAL at 09:51

## 2023-05-09 RX ADMIN — SERTRALINE 200 MG: 50 TABLET, FILM COATED ORAL at 09:51

## 2023-05-09 RX ADMIN — LORAZEPAM 0.5 MG: 0.5 TABLET ORAL at 11:31

## 2023-05-09 RX ADMIN — ATORVASTATIN CALCIUM 40 MG: 40 TABLET, FILM COATED ORAL at 09:51

## 2023-05-09 RX ADMIN — LEVOTHYROXINE SODIUM 125 MCG: 0.12 TABLET ORAL at 06:03

## 2023-05-09 RX ADMIN — ENOXAPARIN SODIUM 30 MG: 100 INJECTION SUBCUTANEOUS at 09:52

## 2023-05-09 RX ADMIN — DILTIAZEM HYDROCHLORIDE 240 MG: 240 CAPSULE, COATED, EXTENDED RELEASE ORAL at 09:51

## 2023-05-09 NOTE — PROGRESS NOTES
Comprehensive Nutrition Assessment    Type and Reason for Visit:  Initial, RD Nutrition Re-Screen/LOS    Nutrition Recommendations/Plan:   Modify diet to CC4 diet   Add chocolate glucerna BID  Monitor nutrition adequacy, pertinent labs, bowel habits, wt changes, and clinical progress     Malnutrition Assessment:  Malnutrition Status: At risk for malnutrition (Comment) (PAUL NFPE, pt wrapped up in blankets) (05/09/23 1204)    Context:  Acute Illness     Findings of the 6 clinical characteristics of malnutrition:  Energy Intake:  Mild decrease in energy intake (Comment)    Nutrition Assessment:    LOS assessment: 77 y.o. f w/ PMH of ILD, dm2, gerd, afib, anxiety admitted w/ ILD w/ severe restrictive lung disease, acute on chronic respiratory failure. On cardiac diet, RD to modify to [employer]. PO intakes % of meals. Pt reports good appetite and intake, eating most meals. Reports drinking ensure PTA. Wt trending down in EMR, difficult to assess significant wt loss. Reports UBW= 111 lb. Pt willing to trial glucerna, RD to add chocolate BID. Declined diet education. Continue to encourage PO intake, will continue to monitor. Nutrition Related Findings:    No labs to review. + BM 5/8. -242 x 24 hours. -1.2 L. Wound Type: None       Current Nutrition Intake & Therapies:    Average Meal Intake: 26-50%, 51-75%, %  Average Supplements Intake: None Ordered  ADULT DIET; Regular; Low Fat/Low Chol/High Fiber/JOEL    Anthropometric Measures:  Height: 5' 3\" (160 cm)  Ideal Body Weight (IBW): 115 lbs (52 kg)       Current Body Weight: 108 lb (49 kg), 93.9 % IBW.  Weight Source: Standing Scale  Current BMI (kg/m2): 19.1        Weight Adjustment For: No Adjustment                 BMI Categories: Underweight (BMI less than 22) age over 72    Estimated Daily Nutrient Needs:  Energy Requirements Based On: Kcal/kg (25-30 kcals/kg)  Weight Used for Energy Requirements: Ideal (52 kg)  Energy (kcal/day): 1774-0872  Weight

## 2023-05-09 NOTE — PROGRESS NOTES
V2.0    [facility] Progress Note      Name:  Mart Obando /Age/Sex: 1955  (67 y.o. female)   MRN & CSN:  [id number] & [id number] Encounter Date/Time: 2023 10:25 AM EDT   Location:  [id number] PCP: Keven Jackson MD     Attending:Lucie Bal MD       Hospital Day: 9    Assessment and Recommendations   Luciano Jeter is a 77 y.o. female with pmh of of ILD, dm2, gerd, afib, anxiety who presents with Acute on chronic respiratory failure with hypoxia and hypercapnia (HCC)     Disposition: Stable for discharge pending oxygen concentrator issues to be resolved for home patient on ambulation is requiring around 12 L oxygen via nasal cannula so needs an extra tank with the patient after liter oxygen concentrator for home patient wants all these things to be arranged at home prior to discharge. Likely discharge tomorrow. Plan:   ILD w/ severe restrictive lung disease, Acute on chronic respiratory failure  -  PFT 2022:  FEV1 = 0.9 L (44%) , FVC = 1.16 L (40%) , FEV1/FVC = 0.83. Lung volumes have been progressively decreasing since first PFT's on file in 2018. - Titrate level of respiratory support according to patient's needs. Target goal SpO2 = 90-94%. Currently patient is requiring 4 L/min O2 support.   At baseline the patient requires 3-6 L/min while at rest.  -  Continued home pirfenidone.    -  Prednisone 40mg daily  -  Pulmonology consulted, apprec recs, concern for end stage lung dz, concern for element of anxiety playing a role in inc'd o2 req, lung transplant mentioned at [address] [place] [address] but needs cardiac stability and plans to dc home with outpt f/u(pt already has appt on 23 at [address] [place] [address])  -  Continued home pirfenidone   -palliative care consulted , pt remains FULL code at this time  -[facility] meeting with daughter pending   -addit concentrator was being arranged     Atrial fibrillation with RVR  -On Cardizem XR and nadolol 40mg per EP, plan to discuss Watchman as

## 2023-05-09 NOTE — CARE COORDINATION
Chart reviewed, pt has discharge order from yesterday but did not leave. Writer spoke with primary RN Checo Lujan, he had pt yesterday as well. Per Checo Lujan, pt requires 12L/NC while ambulating, was only able to stay above 89% on 12L. Per Checo Lujan, pt on 4L at rest.  Writer spoke with [employer] in [place] ([phone]), they can deliver concentrator for 12L with exertion. Order placed, awaiting walk test from RN and then will fax both to [facility] [phone]. OZIEL Gallegos     8847 Addendum:  Pt discussed during huddle with RN Checo Lujna, still awaiting note for walk test.  Writer faxed home o2 order and DC Summary. Writer spoke with  briefly, he is on his way to hospital now, can take pt home.   OZIEL Gallegos

## 2023-05-09 NOTE — DISCHARGE SUMMARY
V2.0  Discharge Summary    Name:  Maria Adams /Age/Sex: 1955 (77 y.o. female)   Admit Date: 2023  Discharge Date: 23    MRN & CSN:  [id number] & [id number] Encounter Date and Time 23 12:47 PM EDT    Attending:  Edinson Kiran MD Discharging Provider: Edinson Kiran MD       Hospital Course:     Brief HPI: Maria Adams is a 77 y.o. female with pmh of of ILD, dm2, gerd, afib, anxiety who presents with Acute on chronic respiratory failure with hypoxia and hypercapnia (HCC)     Disposition: Stable for discharge from medical standpoint     Plan:   ILD w/ severe restrictive lung disease, Acute on chronic respiratory failure  -  PFT 2022:  FEV1 = 0.9 L (44%) , FVC = 1.16 L (40%) , FEV1/FVC = 0.83. Lung volumes have been progressively decreasing since first PFT's on file in 2018. - Titrate level of respiratory support according to patient's needs. Target goal SpO2 = 90-94%. Currently patient is requiring 4 L/min O2 support. At baseline the patient requires 3-6 L/min while at rest.  -  Continued home pirfenidone.    -  Prednisone 40mg daily  -  Pulmonology consulted, apprec recs, concern for end stage lung dz, concern for element of anxiety playing a role in inc'd o2 req, lung transplant mentioned at [facility] but needs cardiac stability and plans to dc home with outpt f/u(pt already has appt on 23 at [facility])  -  Continued home pirfenidone   -palliative care consulted , pt remains FULL code at this time  -[facility] meeting with daughter pending   -addit concentrator was being arranged, discussed with CM team  - Dc on 4-5 L oxygen at home     Atrial fibrillation with RVR  -On Cardizem XR and nadolol 40mg per EP, plan to discuss Watchman as outpt  -Cardiology consulted, apprec mgmt     Chronic HFpEF, PSVT, Frequent PVC's  -  Normal sinus rhythm at the time of admission.  -  Last TTE 22:  LVEF = 55%. -  Continue home atorvastatin, diltiazem.      Microcytic Anemia  -
CT CHEST PULMONARY EMBOLISM W CONTRAST    Result Date: 5/2/2023  EXAMINATION: CTA OF THE CHEST 5/2/2023 6:38 pm TECHNIQUE: CTA of the chest was performed after the administration of intravenous contrast.  Multiplanar reformatted images are provided for review. MIP images are provided for review. Automated exposure control, iterative reconstruction, and/or weight based adjustment of the mA/kV was utilized to reduce the radiation dose to as low as reasonably achievable. COMPARISON: 04/21/2023 CT HISTORY: ORDERING SYSTEM PROVIDED HISTORY: r/o pe TECHNOLOGIST PROVIDED HISTORY: Reason for exam:->r/o pe Reason for Exam: worsening SOB, r/o PE Additional signs and symptoms: h/o ILD FINDINGS: Pulmonary Arteries: Study is of good technical quality for evaluation of pulmonary embolism. There are no filling defects to suggest pulmonary embolism. Main pulmonary artery is normal in caliber. No evidence of right ventricular strain. Mediastinum: The heart is enlarged. Normal aorta. No lymphadenopathy. Normal esophagus. Lungs/pleura: Advanced pattern of emphysema and chronic interstitial change throughout the lungs that is stable. Areas of peripheral honeycombing are once again noted. The degree of mosaic/ground-glass attenuation observed on prior exam has resolved. No acute pulmonary finding on current exam. Upper Abdomen: Visualized abdominal structures in the upper abdomen are normal. Soft Tissues/Bones: No skeletal abnormalities throughout the chest.     1. No pulmonary embolism. 2. Advanced pattern of emphysema and chronic interstitial change that is stable. 3. Resolution of airspace changes seen on the prior recent CT. The lungs are clear on current exam.        CBC: No results for input(s): WBC, HGB, PLT in the last 72 hours. BMP:  No results for input(s): NA, K, CL, CO2, BUN, CREATININE, GLUCOSE in the last 72 hours. Hepatic: No results for input(s): AST, ALT, ALB, BILITOT, ALKPHOS in the last 72 hours.   Lipids:

## 2023-05-10 ENCOUNTER — CARE COORDINATION (OUTPATIENT)
Dept: CASE MANAGEMENT | Age: 68
End: 2023-05-10

## 2023-05-10 ENCOUNTER — TELEPHONE (OUTPATIENT)
Dept: FAMILY MEDICINE CLINIC | Age: 68
End: 2023-05-10

## 2023-05-10 NOTE — CARE COORDINATION
REHABILITATION [facility] Care Transitions Initial Follow Up Call    Call within 2 business days of discharge: Yes    Patient: Jony Chowdary Patient : 1955   MRN: [id number]  Reason for Admission: ARF w/ hypoxia   PMH ILD exacerbation, PNA, COPD with AE, PAF-not on AC 2/2 anemia and epistaxis, dCHF, DM, anxiety, HLD, hypothyroidism -> home with [facility], PASSPORT HHA (daughter), O2 ([facility]) 4lpm at rest and 10lpm with exertion (non rebreather prn) with 12L home concentrator  Discharge Date: 23 RARS: Readmission Risk Score: 35.5      Last Discharge [address]       Date Complaint Diagnosis Description Type Department Provider    23   Admission (Discharged) Whit Chapman MD          Attempted to reach patient via phone for initial post hospital transition call. VM left stating purpose of call along with my contact information requesting a return call. Call to [facility] and spoke to [facility]. Message that  [address] orders were received yesterday and patient is on the schedule      Care Transitions 24 Hour Call    Do you have all of your prescriptions and are they filled?: Yes  Patient DME: Simón Venegas chair  Patient Home Equipment: Nebulizer, Oxygen  Do you have support at home?: Partner/Spouse/SO  Are you an active caregiver in your home?: No  Care Transitions Interventions     .     Follow Up  Future Appointments   Date Time Provider Calvin Mcmullen   2023 10:00 AM PANDA Vazquez - GISELLA Fontenot [facility]   2023 12:30 PM MD Korin uHssein [facility]   2023 11:30 AM Ani Gama MD CLEIBETH PULM [facility]     Rhianna Villa RN

## 2023-05-11 ENCOUNTER — CARE COORDINATION (OUTPATIENT)
Dept: CASE MANAGEMENT | Age: 68
End: 2023-05-11

## 2023-05-11 DIAGNOSIS — J84.116 CRYPTOGENIC ORGANIZING PNEUMONIA (HCC): Primary | ICD-10-CM

## 2023-05-11 NOTE — CARE COORDINATION
[address] Care Transitions Initial Follow Up Call    Call within 2 business days of discharge: Yes      Patient: Jeff Guo Patient : 1955   MRN: [id number]  Reason for Admission: ARF w/ hypoxia   PMH ILD exacerbation, PNA, COPD with AE, PAF-not on AC 2/2 anemia and epistaxis, dCHF, DM, anxiety, HLD, hypothyroidism -> home with [facility], PASSPORT HHA (daughter), O2 ([facility]) 4lpm at rest and 10lpm with exertion (non rebreather prn) with 12L home concentrator    Discharge Date: 23 RARS: Readmission Risk Score: 35.5      Last Discharge [address]       Date Complaint Diagnosis Description Type Department Provider    23   Admission (Discharged) Ulysses Capes, MD        Second and final attempt made to reach patient for initial post hospital transition call. VM left stating purpose of call along with my contact information requesting a return call. Care Transitions 24 Hour Call    Do you have all of your prescriptions and are they filled?: Yes  Patient DME: [employer] chair  Patient Home Equipment: Nebulizer, Oxygen  Do you have support at home?: Partner/Spouse/SO  Are you an active caregiver in your home?: No  Care Transitions Interventions       .     Follow Up  Future Appointments   Date Time Provider Calvin Mcmullen   2023 10:00 AM PANDA Hernandez - GISELLA Sadler [facility]   2023 12:30 PM MD Daniel Montgomery [facility]   2023 11:30 AM MD DURAN Singh PULTJ Muhammad RN

## 2023-05-22 RX ORDER — DILTIAZEM HYDROCHLORIDE 240 MG/1
CAPSULE, COATED, EXTENDED RELEASE ORAL
Qty: 30 CAPSULE | Refills: 2 | Status: SHIPPED | OUTPATIENT
Start: 2023-05-22

## 2023-05-31 NOTE — TELEPHONE ENCOUNTER
Patient scheduled for 09/23 Erivedge Counseling- I discussed with the patient the risks of Erivedge including but not limited to nausea, vomiting, diarrhea, constipation, weight loss, changes in the sense of taste, decreased appetite, muscle spasms, and hair loss.  The patient verbalized understanding of the proper use and possible adverse effects of Erivedge.  All of the patient's questions and concerns were addressed.

## 2023-06-04 DIAGNOSIS — F41.9 ANXIETY: ICD-10-CM

## 2023-06-04 DIAGNOSIS — F32.0 MILD SINGLE CURRENT EPISODE OF MAJOR DEPRESSIVE DISORDER (HCC): ICD-10-CM

## 2023-06-05 RX ORDER — SERTRALINE HYDROCHLORIDE 100 MG/1
TABLET, FILM COATED ORAL
Qty: 180 TABLET | Refills: 1 | OUTPATIENT
Start: 2023-06-05

## 2023-06-05 RX ORDER — MIRTAZAPINE 30 MG/1
TABLET, FILM COATED ORAL
Qty: 90 TABLET | Refills: 1 | OUTPATIENT
Start: 2023-06-05

## 2023-06-07 ENCOUNTER — APPOINTMENT (OUTPATIENT)
Dept: GENERAL RADIOLOGY | Age: 68
End: 2023-06-07
Payer: MEDICARE

## 2023-06-07 ENCOUNTER — HOSPITAL ENCOUNTER (INPATIENT)
Age: 68
LOS: 2 days | Discharge: HOME | End: 2023-06-10
Attending: STUDENT IN AN ORGANIZED HEALTH CARE EDUCATION/TRAINING PROGRAM | Admitting: STUDENT IN AN ORGANIZED HEALTH CARE EDUCATION/TRAINING PROGRAM
Payer: MEDICARE

## 2023-06-07 ENCOUNTER — TELEPHONE (OUTPATIENT)
Dept: OTHER | Facility: CLINIC | Age: 68
End: 2023-06-07

## 2023-06-07 DIAGNOSIS — J84.9 ILD (INTERSTITIAL LUNG DISEASE) (HCC): ICD-10-CM

## 2023-06-07 DIAGNOSIS — J96.21 ACUTE ON CHRONIC RESPIRATORY FAILURE WITH HYPOXIA (HCC): Primary | ICD-10-CM

## 2023-06-07 LAB
ALBUMIN SERPL-MCNC: 4 G/DL (ref 3.4–5)
ALBUMIN/GLOB SERPL: 1.4 {RATIO} (ref 1.1–2.2)
ALP SERPL-CCNC: 76 U/L (ref 40–129)
ALT SERPL-CCNC: 14 U/L (ref 10–40)
ANION GAP SERPL CALCULATED.3IONS-SCNC: 13 MMOL/L (ref 3–16)
AST SERPL-CCNC: 23 U/L (ref 15–37)
BASE EXCESS BLDV CALC-SCNC: 4.3 MMOL/L (ref -3–3)
BASOPHILS # BLD: 0.1 K/UL (ref 0–0.2)
BASOPHILS NFR BLD: 0.7 %
BILIRUB SERPL-MCNC: 0.3 MG/DL (ref 0–1)
BUN SERPL-MCNC: 14 MG/DL (ref 7–20)
CALCIUM SERPL-MCNC: 9 MG/DL (ref 8.3–10.6)
CHLORIDE SERPL-SCNC: 101 MMOL/L (ref 99–110)
CO2 BLDV-SCNC: 32 MMOL/L
CO2 SERPL-SCNC: 28 MMOL/L (ref 21–32)
COHGB MFR BLDV: 1.6 % (ref 0–1.5)
CREAT SERPL-MCNC: 0.6 MG/DL (ref 0.6–1.2)
D DIMER: <0.27 UG/ML FEU (ref 0–0.6)
DEPRECATED RDW RBC AUTO: 16.6 % (ref 12.4–15.4)
EOSINOPHIL # BLD: 0.1 K/UL (ref 0–0.6)
EOSINOPHIL NFR BLD: 1.2 %
FLUAV RNA UPPER RESP QL NAA+PROBE: NEGATIVE
FLUBV AG NPH QL: NEGATIVE
GFR SERPLBLD CREATININE-BSD FMLA CKD-EPI: >60 ML/MIN/{1.73_M2}
GLUCOSE SERPL-MCNC: 207 MG/DL (ref 70–99)
HCO3 BLDV-SCNC: 30.3 MMOL/L (ref 23–29)
HCT VFR BLD AUTO: 31.1 % (ref 36–48)
HGB BLD-MCNC: 9.4 G/DL (ref 12–16)
LYMPHOCYTES # BLD: 0.7 K/UL (ref 1–5.1)
LYMPHOCYTES NFR BLD: 8.1 %
MCH RBC QN AUTO: 22.7 PG (ref 26–34)
MCHC RBC AUTO-ENTMCNC: 30.2 G/DL (ref 31–36)
MCV RBC AUTO: 75.3 FL (ref 80–100)
METHGB MFR BLDV: 0.3 %
MONOCYTES # BLD: 0.3 K/UL (ref 0–1.3)
MONOCYTES NFR BLD: 3.3 %
NEUTROPHILS # BLD: 7.8 K/UL (ref 1.7–7.7)
NEUTROPHILS NFR BLD: 86.7 %
NT-PROBNP SERPL-MCNC: 1570 PG/ML (ref 0–124)
O2 THERAPY: ABNORMAL
PCO2 BLDV: 52.3 MMHG (ref 40–50)
PH BLDV: 7.38 [PH] (ref 7.35–7.45)
PLATELET # BLD AUTO: 233 K/UL (ref 135–450)
PMV BLD AUTO: 7.5 FL (ref 5–10.5)
PO2 BLDV: 32.3 MMHG (ref 25–40)
POTASSIUM SERPL-SCNC: 4.2 MMOL/L (ref 3.5–5.1)
PROT SERPL-MCNC: 6.8 G/DL (ref 6.4–8.2)
RBC # BLD AUTO: 4.13 M/UL (ref 4–5.2)
SAO2 % BLDV: 60 %
SARS-COV-2 RDRP RESP QL NAA+PROBE: NOT DETECTED
SODIUM SERPL-SCNC: 142 MMOL/L (ref 136–145)
TROPONIN, HIGH SENSITIVITY: 14 NG/L (ref 0–14)
WBC # BLD AUTO: 9 K/UL (ref 4–11)

## 2023-06-07 PROCEDURE — 96374 THER/PROPH/DIAG INJ IV PUSH: CPT

## 2023-06-07 PROCEDURE — 87635 SARS-COV-2 COVID-19 AMP PRB: CPT

## 2023-06-07 PROCEDURE — 99285 EMERGENCY DEPT VISIT HI MDM: CPT

## 2023-06-07 PROCEDURE — 80053 COMPREHEN METABOLIC PANEL: CPT

## 2023-06-07 PROCEDURE — 87804 INFLUENZA ASSAY W/OPTIC: CPT

## 2023-06-07 PROCEDURE — 6370000000 HC RX 637 (ALT 250 FOR IP): Performed by: STUDENT IN AN ORGANIZED HEALTH CARE EDUCATION/TRAINING PROGRAM

## 2023-06-07 PROCEDURE — 6360000002 HC RX W HCPCS: Performed by: STUDENT IN AN ORGANIZED HEALTH CARE EDUCATION/TRAINING PROGRAM

## 2023-06-07 PROCEDURE — 84484 ASSAY OF TROPONIN QUANT: CPT

## 2023-06-07 PROCEDURE — 83880 ASSAY OF NATRIURETIC PEPTIDE: CPT

## 2023-06-07 PROCEDURE — 71045 X-RAY EXAM CHEST 1 VIEW: CPT

## 2023-06-07 PROCEDURE — 2580000003 HC RX 258: Performed by: STUDENT IN AN ORGANIZED HEALTH CARE EDUCATION/TRAINING PROGRAM

## 2023-06-07 PROCEDURE — 85025 COMPLETE CBC W/AUTO DIFF WBC: CPT

## 2023-06-07 PROCEDURE — 82803 BLOOD GASES ANY COMBINATION: CPT

## 2023-06-07 PROCEDURE — 36415 COLL VENOUS BLD VENIPUNCTURE: CPT

## 2023-06-07 PROCEDURE — 85379 FIBRIN DEGRADATION QUANT: CPT

## 2023-06-07 PROCEDURE — 93005 ELECTROCARDIOGRAM TRACING: CPT | Performed by: STUDENT IN AN ORGANIZED HEALTH CARE EDUCATION/TRAINING PROGRAM

## 2023-06-07 RX ORDER — IPRATROPIUM BROMIDE AND ALBUTEROL SULFATE 2.5; .5 MG/3ML; MG/3ML
2 SOLUTION RESPIRATORY (INHALATION) ONCE
Status: COMPLETED | OUTPATIENT
Start: 2023-06-07 | End: 2023-06-07

## 2023-06-07 RX ADMIN — IPRATROPIUM BROMIDE AND ALBUTEROL SULFATE 2 DOSE: .5; 2.5 SOLUTION RESPIRATORY (INHALATION) at 21:56

## 2023-06-07 RX ADMIN — WATER 125 MG: 1 INJECTION INTRAMUSCULAR; INTRAVENOUS; SUBCUTANEOUS at 21:56

## 2023-06-08 PROBLEM — J84.9 ILD (INTERSTITIAL LUNG DISEASE) (HCC): Status: ACTIVE | Noted: 2023-06-08

## 2023-06-08 PROBLEM — J96.01 ACUTE RESPIRATORY FAILURE WITH HYPOXIA (HCC): Status: ACTIVE | Noted: 2023-06-08

## 2023-06-08 LAB
ANION GAP SERPL CALCULATED.3IONS-SCNC: 12 MMOL/L (ref 3–16)
BASE EXCESS BLDV CALC-SCNC: 8.8 MMOL/L (ref -3–3)
BASOPHILS # BLD: 0 K/UL (ref 0–0.2)
BASOPHILS NFR BLD: 0.5 %
BUN SERPL-MCNC: 12 MG/DL (ref 7–20)
CALCIUM SERPL-MCNC: 9.5 MG/DL (ref 8.3–10.6)
CHLORIDE SERPL-SCNC: 97 MMOL/L (ref 99–110)
CO2 BLDV-SCNC: 35 MMOL/L
CO2 SERPL-SCNC: 32 MMOL/L (ref 21–32)
COHGB MFR BLDV: 1.6 % (ref 0–1.5)
CREAT SERPL-MCNC: <0.5 MG/DL (ref 0.6–1.2)
DEPRECATED RDW RBC AUTO: 16.5 % (ref 12.4–15.4)
EKG ATRIAL RATE: 85 BPM
EKG DIAGNOSIS: NORMAL
EKG P AXIS: -8 DEGREES
EKG P-R INTERVAL: 154 MS
EKG Q-T INTERVAL: 382 MS
EKG QRS DURATION: 72 MS
EKG QTC CALCULATION (BAZETT): 454 MS
EKG R AXIS: 11 DEGREES
EKG T AXIS: -30 DEGREES
EKG VENTRICULAR RATE: 85 BPM
EOSINOPHIL # BLD: 0 K/UL (ref 0–0.6)
EOSINOPHIL NFR BLD: 0 %
GFR SERPLBLD CREATININE-BSD FMLA CKD-EPI: >60 ML/MIN/{1.73_M2}
GLUCOSE BLD-MCNC: 151 MG/DL (ref 70–99)
GLUCOSE BLD-MCNC: 189 MG/DL (ref 70–99)
GLUCOSE BLD-MCNC: 190 MG/DL (ref 70–99)
GLUCOSE BLD-MCNC: 206 MG/DL (ref 70–99)
GLUCOSE BLD-MCNC: 251 MG/DL (ref 70–99)
GLUCOSE SERPL-MCNC: 206 MG/DL (ref 70–99)
HCO3 BLDV-SCNC: 33.2 MMOL/L (ref 23–29)
HCT VFR BLD AUTO: 30.7 % (ref 36–48)
HGB BLD-MCNC: 9.6 G/DL (ref 12–16)
INR PPP: 1.15 (ref 0.84–1.16)
LYMPHOCYTES # BLD: 0.5 K/UL (ref 1–5.1)
LYMPHOCYTES NFR BLD: 5.8 %
MAGNESIUM SERPL-MCNC: 1.8 MG/DL (ref 1.8–2.4)
MCH RBC QN AUTO: 23.4 PG (ref 26–34)
MCHC RBC AUTO-ENTMCNC: 31.3 G/DL (ref 31–36)
MCV RBC AUTO: 74.5 FL (ref 80–100)
METHGB MFR BLDV: 0.3 %
MONOCYTES # BLD: 0 K/UL (ref 0–1.3)
MONOCYTES NFR BLD: 0.5 %
NEUTROPHILS # BLD: 7.5 K/UL (ref 1.7–7.7)
NEUTROPHILS NFR BLD: 93.2 %
O2 THERAPY: ABNORMAL
PCO2 BLDV: 45.3 MMHG (ref 40–50)
PERFORMED ON: ABNORMAL
PH BLDV: 7.48 [PH] (ref 7.35–7.45)
PLATELET # BLD AUTO: 204 K/UL (ref 135–450)
PMV BLD AUTO: 7.8 FL (ref 5–10.5)
PO2 BLDV: 187 MMHG (ref 25–40)
POTASSIUM SERPL-SCNC: 3.5 MMOL/L (ref 3.5–5.1)
PROCALCITONIN SERPL IA-MCNC: 0.06 NG/ML (ref 0–0.15)
PROTHROMBIN TIME: 14.7 SEC (ref 11.5–14.8)
RBC # BLD AUTO: 4.12 M/UL (ref 4–5.2)
SAO2 % BLDV: 99 %
SODIUM SERPL-SCNC: 141 MMOL/L (ref 136–145)
T4 FREE SERPL-MCNC: 1.6 NG/DL (ref 0.9–1.8)
TSH SERPL DL<=0.005 MIU/L-ACNC: 0.11 UIU/ML (ref 0.27–4.2)
WBC # BLD AUTO: 8.1 K/UL (ref 4–11)

## 2023-06-08 PROCEDURE — 83036 HEMOGLOBIN GLYCOSYLATED A1C: CPT

## 2023-06-08 PROCEDURE — 85610 PROTHROMBIN TIME: CPT

## 2023-06-08 PROCEDURE — 6370000000 HC RX 637 (ALT 250 FOR IP): Performed by: STUDENT IN AN ORGANIZED HEALTH CARE EDUCATION/TRAINING PROGRAM

## 2023-06-08 PROCEDURE — 84439 ASSAY OF FREE THYROXINE: CPT

## 2023-06-08 PROCEDURE — 94761 N-INVAS EAR/PLS OXIMETRY MLT: CPT

## 2023-06-08 PROCEDURE — 6370000000 HC RX 637 (ALT 250 FOR IP): Performed by: INTERNAL MEDICINE

## 2023-06-08 PROCEDURE — 2060000000 HC ICU INTERMEDIATE R&B

## 2023-06-08 PROCEDURE — 2700000000 HC OXYGEN THERAPY PER DAY

## 2023-06-08 PROCEDURE — 85025 COMPLETE CBC W/AUTO DIFF WBC: CPT

## 2023-06-08 PROCEDURE — 82803 BLOOD GASES ANY COMBINATION: CPT

## 2023-06-08 PROCEDURE — 6370000000 HC RX 637 (ALT 250 FOR IP): Performed by: NURSE PRACTITIONER

## 2023-06-08 PROCEDURE — 97166 OT EVAL MOD COMPLEX 45 MIN: CPT

## 2023-06-08 PROCEDURE — 97162 PT EVAL MOD COMPLEX 30 MIN: CPT

## 2023-06-08 PROCEDURE — 36415 COLL VENOUS BLD VENIPUNCTURE: CPT

## 2023-06-08 PROCEDURE — 94669 MECHANICAL CHEST WALL OSCILL: CPT

## 2023-06-08 PROCEDURE — 94640 AIRWAY INHALATION TREATMENT: CPT

## 2023-06-08 PROCEDURE — 97530 THERAPEUTIC ACTIVITIES: CPT

## 2023-06-08 PROCEDURE — 84443 ASSAY THYROID STIM HORMONE: CPT

## 2023-06-08 PROCEDURE — 99223 1ST HOSP IP/OBS HIGH 75: CPT | Performed by: INTERNAL MEDICINE

## 2023-06-08 PROCEDURE — 2580000003 HC RX 258: Performed by: STUDENT IN AN ORGANIZED HEALTH CARE EDUCATION/TRAINING PROGRAM

## 2023-06-08 PROCEDURE — 6360000002 HC RX W HCPCS: Performed by: STUDENT IN AN ORGANIZED HEALTH CARE EDUCATION/TRAINING PROGRAM

## 2023-06-08 PROCEDURE — 83735 ASSAY OF MAGNESIUM: CPT

## 2023-06-08 PROCEDURE — 84145 PROCALCITONIN (PCT): CPT

## 2023-06-08 PROCEDURE — 97535 SELF CARE MNGMENT TRAINING: CPT

## 2023-06-08 PROCEDURE — 93010 ELECTROCARDIOGRAM REPORT: CPT | Performed by: INTERNAL MEDICINE

## 2023-06-08 PROCEDURE — 80048 BASIC METABOLIC PNL TOTAL CA: CPT

## 2023-06-08 RX ORDER — DEXTROSE MONOHYDRATE 100 MG/ML
INJECTION, SOLUTION INTRAVENOUS CONTINUOUS PRN
Status: DISCONTINUED | OUTPATIENT
Start: 2023-06-08 | End: 2023-06-10 | Stop reason: HOSPADM

## 2023-06-08 RX ORDER — NADOLOL 40 MG/1
20 TABLET ORAL DAILY
Status: DISCONTINUED | OUTPATIENT
Start: 2023-06-08 | End: 2023-06-10 | Stop reason: HOSPADM

## 2023-06-08 RX ORDER — INSULIN LISPRO 100 [IU]/ML
0-4 INJECTION, SOLUTION INTRAVENOUS; SUBCUTANEOUS
Status: DISCONTINUED | OUTPATIENT
Start: 2023-06-08 | End: 2023-06-10 | Stop reason: HOSPADM

## 2023-06-08 RX ORDER — GUAIFENESIN 600 MG/1
600 TABLET, EXTENDED RELEASE ORAL 2 TIMES DAILY
Status: DISCONTINUED | OUTPATIENT
Start: 2023-06-08 | End: 2023-06-10 | Stop reason: HOSPADM

## 2023-06-08 RX ORDER — LORAZEPAM 0.5 MG/1
0.5 TABLET ORAL EVERY 6 HOURS PRN
Status: DISCONTINUED | OUTPATIENT
Start: 2023-06-08 | End: 2023-06-10 | Stop reason: HOSPADM

## 2023-06-08 RX ORDER — POTASSIUM CHLORIDE 20 MEQ/1
20 TABLET, EXTENDED RELEASE ORAL
Status: DISCONTINUED | OUTPATIENT
Start: 2023-06-08 | End: 2023-06-10 | Stop reason: HOSPADM

## 2023-06-08 RX ORDER — PANTOPRAZOLE SODIUM 40 MG/1
40 TABLET, DELAYED RELEASE ORAL DAILY
Status: DISCONTINUED | OUTPATIENT
Start: 2023-06-08 | End: 2023-06-10 | Stop reason: HOSPADM

## 2023-06-08 RX ORDER — AZITHROMYCIN 250 MG/1
500 TABLET, FILM COATED ORAL DAILY
Status: DISCONTINUED | OUTPATIENT
Start: 2023-06-08 | End: 2023-06-10 | Stop reason: HOSPADM

## 2023-06-08 RX ORDER — GUAIFENESIN 600 MG/1
600 TABLET, EXTENDED RELEASE ORAL 2 TIMES DAILY PRN
Status: DISCONTINUED | OUTPATIENT
Start: 2023-06-08 | End: 2023-06-08

## 2023-06-08 RX ORDER — ACETAMINOPHEN 650 MG/1
650 SUPPOSITORY RECTAL EVERY 6 HOURS PRN
Status: DISCONTINUED | OUTPATIENT
Start: 2023-06-08 | End: 2023-06-10 | Stop reason: HOSPADM

## 2023-06-08 RX ORDER — MIRTAZAPINE 30 MG/1
30 TABLET, FILM COATED ORAL NIGHTLY
Status: DISCONTINUED | OUTPATIENT
Start: 2023-06-08 | End: 2023-06-10 | Stop reason: HOSPADM

## 2023-06-08 RX ORDER — POLYETHYLENE GLYCOL 3350 17 G/17G
17 POWDER, FOR SOLUTION ORAL DAILY PRN
Status: DISCONTINUED | OUTPATIENT
Start: 2023-06-08 | End: 2023-06-10 | Stop reason: HOSPADM

## 2023-06-08 RX ORDER — PIRFENIDONE 267 MG/1
1 TABLET, FILM COATED ORAL 3 TIMES DAILY
Status: DISCONTINUED | OUTPATIENT
Start: 2023-06-08 | End: 2023-06-09 | Stop reason: SDUPTHER

## 2023-06-08 RX ORDER — SODIUM CHLORIDE 9 MG/ML
INJECTION, SOLUTION INTRAVENOUS PRN
Status: DISCONTINUED | OUTPATIENT
Start: 2023-06-08 | End: 2023-06-10 | Stop reason: HOSPADM

## 2023-06-08 RX ORDER — HYDROXYZINE HYDROCHLORIDE 10 MG/1
10 TABLET, FILM COATED ORAL 3 TIMES DAILY PRN
Status: DISCONTINUED | OUTPATIENT
Start: 2023-06-08 | End: 2023-06-10 | Stop reason: HOSPADM

## 2023-06-08 RX ORDER — ENOXAPARIN SODIUM 100 MG/ML
40 INJECTION SUBCUTANEOUS EVERY EVENING
Status: DISCONTINUED | OUTPATIENT
Start: 2023-06-09 | End: 2023-06-10 | Stop reason: HOSPADM

## 2023-06-08 RX ORDER — SODIUM CHLORIDE 0.9 % (FLUSH) 0.9 %
5-40 SYRINGE (ML) INJECTION EVERY 12 HOURS SCHEDULED
Status: DISCONTINUED | OUTPATIENT
Start: 2023-06-08 | End: 2023-06-10 | Stop reason: HOSPADM

## 2023-06-08 RX ORDER — SODIUM CHLORIDE 0.9 % (FLUSH) 0.9 %
5-40 SYRINGE (ML) INJECTION PRN
Status: DISCONTINUED | OUTPATIENT
Start: 2023-06-08 | End: 2023-06-10 | Stop reason: HOSPADM

## 2023-06-08 RX ORDER — IPRATROPIUM BROMIDE AND ALBUTEROL SULFATE 2.5; .5 MG/3ML; MG/3ML
1 SOLUTION RESPIRATORY (INHALATION) EVERY 4 HOURS
Status: DISCONTINUED | OUTPATIENT
Start: 2023-06-08 | End: 2023-06-08

## 2023-06-08 RX ORDER — ONDANSETRON 2 MG/ML
4 INJECTION INTRAMUSCULAR; INTRAVENOUS EVERY 6 HOURS PRN
Status: DISCONTINUED | OUTPATIENT
Start: 2023-06-08 | End: 2023-06-10 | Stop reason: HOSPADM

## 2023-06-08 RX ORDER — POTASSIUM CHLORIDE 7.45 MG/ML
10 INJECTION INTRAVENOUS PRN
Status: DISCONTINUED | OUTPATIENT
Start: 2023-06-08 | End: 2023-06-10 | Stop reason: HOSPADM

## 2023-06-08 RX ORDER — BENZONATATE 100 MG/1
100 CAPSULE ORAL EVERY 4 HOURS PRN
Status: DISCONTINUED | OUTPATIENT
Start: 2023-06-08 | End: 2023-06-10 | Stop reason: HOSPADM

## 2023-06-08 RX ORDER — FUROSEMIDE 10 MG/ML
40 INJECTION INTRAMUSCULAR; INTRAVENOUS ONCE
Status: COMPLETED | OUTPATIENT
Start: 2023-06-08 | End: 2023-06-08

## 2023-06-08 RX ORDER — ACETAMINOPHEN 325 MG/1
650 TABLET ORAL EVERY 6 HOURS PRN
Status: DISCONTINUED | OUTPATIENT
Start: 2023-06-08 | End: 2023-06-10 | Stop reason: HOSPADM

## 2023-06-08 RX ORDER — ATORVASTATIN CALCIUM 40 MG/1
40 TABLET, FILM COATED ORAL DAILY
Status: DISCONTINUED | OUTPATIENT
Start: 2023-06-08 | End: 2023-06-10 | Stop reason: HOSPADM

## 2023-06-08 RX ORDER — LEVOTHYROXINE SODIUM 0.1 MG/1
100 TABLET ORAL DAILY
Status: DISCONTINUED | OUTPATIENT
Start: 2023-06-08 | End: 2023-06-10 | Stop reason: HOSPADM

## 2023-06-08 RX ORDER — DILTIAZEM HYDROCHLORIDE 240 MG/1
240 CAPSULE, COATED, EXTENDED RELEASE ORAL DAILY
Status: DISCONTINUED | OUTPATIENT
Start: 2023-06-08 | End: 2023-06-10 | Stop reason: HOSPADM

## 2023-06-08 RX ORDER — SERTRALINE HYDROCHLORIDE 100 MG/1
100 TABLET, FILM COATED ORAL DAILY
Status: DISCONTINUED | OUTPATIENT
Start: 2023-06-08 | End: 2023-06-10 | Stop reason: HOSPADM

## 2023-06-08 RX ORDER — MAGNESIUM SULFATE IN WATER 40 MG/ML
2000 INJECTION, SOLUTION INTRAVENOUS PRN
Status: DISCONTINUED | OUTPATIENT
Start: 2023-06-08 | End: 2023-06-10 | Stop reason: HOSPADM

## 2023-06-08 RX ORDER — LEVOTHYROXINE SODIUM 0.12 MG/1
125 TABLET ORAL DAILY
Status: DISCONTINUED | OUTPATIENT
Start: 2023-06-08 | End: 2023-06-08

## 2023-06-08 RX ORDER — TRAMADOL HYDROCHLORIDE 50 MG/1
50 TABLET ORAL EVERY 6 HOURS PRN
Status: DISCONTINUED | OUTPATIENT
Start: 2023-06-08 | End: 2023-06-10 | Stop reason: HOSPADM

## 2023-06-08 RX ORDER — ENOXAPARIN SODIUM 100 MG/ML
30 INJECTION SUBCUTANEOUS EVERY EVENING
Status: DISCONTINUED | OUTPATIENT
Start: 2023-06-09 | End: 2023-06-08

## 2023-06-08 RX ORDER — IPRATROPIUM BROMIDE AND ALBUTEROL SULFATE 2.5; .5 MG/3ML; MG/3ML
1 SOLUTION RESPIRATORY (INHALATION)
Status: DISCONTINUED | OUTPATIENT
Start: 2023-06-08 | End: 2023-06-10 | Stop reason: HOSPADM

## 2023-06-08 RX ORDER — INSULIN LISPRO 100 [IU]/ML
0-4 INJECTION, SOLUTION INTRAVENOUS; SUBCUTANEOUS NIGHTLY
Status: DISCONTINUED | OUTPATIENT
Start: 2023-06-08 | End: 2023-06-10 | Stop reason: HOSPADM

## 2023-06-08 RX ORDER — ONDANSETRON 4 MG/1
4 TABLET, ORALLY DISINTEGRATING ORAL EVERY 8 HOURS PRN
Status: DISCONTINUED | OUTPATIENT
Start: 2023-06-08 | End: 2023-06-10 | Stop reason: HOSPADM

## 2023-06-08 RX ADMIN — IPRATROPIUM BROMIDE AND ALBUTEROL SULFATE 1 DOSE: .5; 2.5 SOLUTION RESPIRATORY (INHALATION) at 15:28

## 2023-06-08 RX ADMIN — AZITHROMYCIN 500 MG: 250 TABLET, FILM COATED ORAL at 14:28

## 2023-06-08 RX ADMIN — GUAIFENESIN 600 MG: 600 TABLET, EXTENDED RELEASE ORAL at 19:46

## 2023-06-08 RX ADMIN — BENZONATATE 100 MG: 100 CAPSULE ORAL at 19:46

## 2023-06-08 RX ADMIN — IPRATROPIUM BROMIDE AND ALBUTEROL SULFATE 1 DOSE: .5; 2.5 SOLUTION RESPIRATORY (INHALATION) at 20:19

## 2023-06-08 RX ADMIN — WATER 60 MG: 1 INJECTION INTRAMUSCULAR; INTRAVENOUS; SUBCUTANEOUS at 04:08

## 2023-06-08 RX ADMIN — MIRTAZAPINE 30 MG: 30 TABLET, FILM COATED ORAL at 01:55

## 2023-06-08 RX ADMIN — NADOLOL 20 MG: 40 TABLET ORAL at 11:38

## 2023-06-08 RX ADMIN — IPRATROPIUM BROMIDE AND ALBUTEROL SULFATE 1 DOSE: .5; 2.5 SOLUTION RESPIRATORY (INHALATION) at 07:47

## 2023-06-08 RX ADMIN — POTASSIUM CHLORIDE 20 MEQ: 1500 TABLET, EXTENDED RELEASE ORAL at 08:55

## 2023-06-08 RX ADMIN — WATER 60 MG: 1 INJECTION INTRAMUSCULAR; INTRAVENOUS; SUBCUTANEOUS at 09:18

## 2023-06-08 RX ADMIN — IPRATROPIUM BROMIDE AND ALBUTEROL SULFATE 1 DOSE: .5; 2.5 SOLUTION RESPIRATORY (INHALATION) at 03:05

## 2023-06-08 RX ADMIN — GUAIFENESIN 600 MG: 600 TABLET, EXTENDED RELEASE ORAL at 14:28

## 2023-06-08 RX ADMIN — SODIUM CHLORIDE, PRESERVATIVE FREE 10 ML: 5 INJECTION INTRAVENOUS at 19:46

## 2023-06-08 RX ADMIN — DILTIAZEM HYDROCHLORIDE 240 MG: 240 CAPSULE, COATED, EXTENDED RELEASE ORAL at 11:38

## 2023-06-08 RX ADMIN — HYDROXYZINE HYDROCHLORIDE 10 MG: 10 TABLET ORAL at 19:47

## 2023-06-08 RX ADMIN — IPRATROPIUM BROMIDE AND ALBUTEROL SULFATE 1 DOSE: .5; 2.5 SOLUTION RESPIRATORY (INHALATION) at 11:15

## 2023-06-08 RX ADMIN — INSULIN LISPRO 2 UNITS: 100 INJECTION, SOLUTION INTRAVENOUS; SUBCUTANEOUS at 11:39

## 2023-06-08 RX ADMIN — SODIUM CHLORIDE, PRESERVATIVE FREE 10 ML: 5 INJECTION INTRAVENOUS at 09:19

## 2023-06-08 RX ADMIN — MIRTAZAPINE 30 MG: 30 TABLET, FILM COATED ORAL at 19:46

## 2023-06-08 RX ADMIN — PANTOPRAZOLE SODIUM 40 MG: 40 TABLET, DELAYED RELEASE ORAL at 08:55

## 2023-06-08 RX ADMIN — FUROSEMIDE 40 MG: 10 INJECTION, SOLUTION INTRAMUSCULAR; INTRAVENOUS at 01:55

## 2023-06-08 RX ADMIN — ACETAMINOPHEN 650 MG: 325 TABLET ORAL at 19:46

## 2023-06-08 RX ADMIN — SERTRALINE 100 MG: 100 TABLET, FILM COATED ORAL at 11:38

## 2023-06-08 RX ADMIN — ATORVASTATIN CALCIUM 40 MG: 40 TABLET, FILM COATED ORAL at 08:55

## 2023-06-08 RX ADMIN — LEVOTHYROXINE SODIUM 100 MCG: 100 TABLET ORAL at 09:18

## 2023-06-08 ASSESSMENT — PAIN DESCRIPTION - DESCRIPTORS: DESCRIPTORS: ACHING

## 2023-06-08 ASSESSMENT — PAIN DESCRIPTION - FREQUENCY: FREQUENCY: CONTINUOUS

## 2023-06-08 ASSESSMENT — PAIN DESCRIPTION - PAIN TYPE: TYPE: ACUTE PAIN

## 2023-06-08 ASSESSMENT — PAIN SCALES - GENERAL: PAINLEVEL_OUTOF10: 5

## 2023-06-08 ASSESSMENT — PAIN DESCRIPTION - ONSET: ONSET: ON-GOING

## 2023-06-08 ASSESSMENT — PAIN DESCRIPTION - LOCATION: LOCATION: HEAD

## 2023-06-08 NOTE — TELEPHONE ENCOUNTER
Admission orders were placed before writer contacted Dr. Mis Recinos to inform of 30 day readmission risk.

## 2023-06-08 NOTE — FLOWSHEET NOTE
06/08/23 0400   Vital Signs   Temp 97.6 °F (36.4 °C)   Temp Source Oral   Pulse 97   Heart Rate Source Monitor   Respirations 22   /79   MAP (Calculated) 98   BP Location Right upper arm   BP Method Automatic   Patient Position Semi fowlers   Level of Consciousness 0   MEWS Score 2   Oxygen Therapy   SpO2 90 %   O2 Device High flow nasal cannula   O2 Flow Rate (L/min) 10 L/min   Height and Weight   Weight - Scale 123 lb 4.8 oz (55.9 kg)   Weight Method Actual;Bed scale   BMI (Calculated) 21.9     Vitals obtained, see above.  Pt resting in bed with call light in reach

## 2023-06-08 NOTE — ED NOTES
Report called to [facility] PCU. Verbalized understanding in ETA. Denies questions/concerns.       Rajesh Hatfield RN  06/07/23 7752

## 2023-06-08 NOTE — ED NOTES
Verbal handoff report given to [facility], POC transferred at this time. All question answered, patient stable without distress.        Jhonathan Mena RN  06/07/23 [id number]

## 2023-06-08 NOTE — FLOWSHEET NOTE
06/08/23 1620   Vital Signs   Temp 98.1 °F (36.7 °C)   Temp Source Oral   Pulse 74   Heart Rate Source Monitor   Respirations 20   BP (!) 95/54   MAP (Calculated) 68   BP Location Right upper arm   BP Method Automatic   Patient Position Lying left side   Level of Consciousness 0   MEWS Score 2   Oxygen Therapy   SpO2 98 %   O2 Device High flow nasal cannula   O2 Flow Rate (L/min) 8 L/min     Pt awake in bed. VS as shown above. Pt denies any further needs at this time. Call light in reach and bed in lowest position.

## 2023-06-08 NOTE — ACP (ADVANCE CARE PLANNING)
Advance Care Planning     General Advance Care Planning (ACP) Conversation    Date of Conversation: 6/8/2023  Conducted with: Patient with Decision Making Capacity    Healthcare Decision Maker:    Primary Decision Maker: Mulu Hopper - Spouse - [phone]    Secondary Decision Maker: María Guardado - Child - [phone]  Click here to complete Healthcare Decision Makers including selection of the Healthcare Decision Maker Relationship (ie \"Primary\"). Today we documented Decision Maker(s) consistent with ACP documents on file. Content/Action Overview:   Has ACP document(s) on file - reflects the patient's care preferences  Reviewed DNR/DNI and patient elects Full Code (Attempt Resuscitation)        Length of Voluntary ACP Conversation in minutes:  <16 minutes (Non-Billable)    Vickie Crawford RN

## 2023-06-08 NOTE — ED PROVIDER NOTES
rate and rhythm, normal S1 & S2, no extra heart sounds. Perfusion:  intact  Respiratory: Mildly labored, diffuse crackles/rhonchi diffusely, expiratory wheezing  Abdominal:  Normal bowel sounds. Soft. Nontender. Non distended. Back:  No CVA tenderness to palpation     Neurological:  Alert and oriented times 3. No focal neuro deficits. Psychiatric:  Appropriate    I have reviewed and interpreted all of the currently available lab results from this visit (if applicable):  No results found for this visit on 06/07/23. Radiographs (if obtained):  Radiologist's Report Reviewed:  No results found. EKG (if obtained): (All EKG's are interpreted by myself in the absence of a cardiologist)  Normal sinus rhythm, ventricular rate 85, CT interval 154, QRS duration 72, QTc 454, T wave inversions in the inferior as well as anterior lateral leads, no significant ST elevation or depression    MDM:    25-year-old female presenting with history seen above. Patient is tachycardic and tachypneic on presentation. Patient is on nonrebreather. Patient given DuoNebs and steroids in the ED. Patient initially on nonrebreather with 15 L. Patient transitioned to high flow nasal cannula initially 15 L but brought down to 10 L. Patient's symptoms do seem to be improving on reevaluation with improving tachypnea and oxygen saturations. Chest x-ray is stable for patient. Laboratory evaluation overall reassuring and stable for patient. Hospitalist called and patient admitted their service for further evaluation and treatment. Clinical Impression:  1. Acute on chronic respiratory failure with hypoxia (HCC)    2. ILD (interstitial lung disease) ([facility] [address]      Total critical care time provided today was 32 minutes. This excludes seperately billable procedures and family discussion time.  Critical care time provided for obtaining history, conducting a physical exam, performing and monitoring interventions, ordering,

## 2023-06-08 NOTE — CONSULTS
by Katie Rodriguez MD at [facility] N/A 12/24/2019    BRONCHOSCOPY ALVEOLAR LAVAGE performed by Lori Pendleton [facility] N/A 8/25/2022    COLONOSCOPY POLYPECTOMY SNARE/COLD BIOPSY performed by Niko Mendez MD at [address] [place]  9/15/2022    CT GUIDED CHEST TUBE 9/15/2022 [address] SCAN    CT INSERT CATH PLEURA W IMAGE  11/28/2022    CT INSERT CATH PLEURA W IMAGE 11/28/2022 Marie Abbott MD [facility] CT SCAN    HYSTERECTOMY, TOTAL ABDOMINAL (CERVIX REMOVED)      partial       FAMILY HISTORY:  family history includes Asthma in her sister; Diabetes in her mother; High Blood Pressure in her mother; Other in her father. SOCIAL HISTORY:   reports that she has never smoked. She has never used smokeless tobacco.    Scheduled Meds:   methylPREDNISolone  60 mg IntraVENous Q6H    ipratropium 0.5 mg-albuterol 2.5 mg  1 Dose Inhalation Q4H    atorvastatin  40 mg Oral Daily    dilTIAZem  240 mg Oral Daily    levothyroxine  125 mcg Oral Daily    mirtazapine  30 mg Oral Nightly    nadolol  20 mg Oral Daily    pantoprazole  40 mg Oral Daily    Pirfenidone  1 tablet Oral TID    potassium chloride  20 mEq Oral Daily with breakfast    sertraline  100 mg Oral Daily    sodium chloride flush  5-40 mL IntraVENous 2 times per day    [START ON 6/9/2023] enoxaparin  30 mg SubCUTAneous QPM     Continuous Infusions:   sodium chloride       PRN Meds:  benzonatate, guaiFENesin, hydrOXYzine HCl, LORazepam, traMADol, sodium chloride flush, sodium chloride, potassium chloride, magnesium sulfate, ondansetron **OR** ondansetron, polyethylene glycol, acetaminophen **OR** acetaminophen    ALLERGIES:  Patient is allergic to penicillins, cefuroxime, doxycycline, imitrex [sumatriptan], meperidine, sulfa antibiotics, keflex [cephalexin], and tape [adhesive tape].     REVIEW OF SYSTEMS:  Constitutional: Negative for fever  HENT: Negative for sore throat  Eyes:

## 2023-06-08 NOTE — H&P
consulted      ILD  with AE  COPD  AE  - solumedrol for now, transition to prednisone once improved   - inhaled bronchodilators   - no infiltrate on CXR  - check sputum cultures   - follows with [facility], on the transplant list   - on pirfenidone at home, pt will have to bring in home meds  - recent admission at [facility] palliative care consulted and wishes to remain a full code   - procal negative       PAF  - NSR  - continue home regimen: Cardizem, nadolol   - troponin 14  - not on anticoagulation due to anemia and epistaxis   - plan to discuss [facility] as outpatient       HTN  - blood pressure soft, appears to be chronic  - on Cardizem and nadolol  - continue and monitor     Hypothyroidism  - home dose of synthroid 125 mcg--reduce synthroid to 100 mcg  - TSH 0.11, free T4 pending       Chronic Diastolic CHF  - appears dry, BNP was elevated and was given 40 IV Lasix in ED  - takes PRN Lasix at home  - daily weights, low sodium diet, strict I/O     Microcytic anemia  - stable, hemoglobin 9.6 today   - monitor     HLD  - Continue statin        Depression  Anxiety  - continue home hydroxyzine PRN, sertraline, and lorazepam PRN      Hx of DM, now with hyperglycemia  - not on any medications, likely steroid induced   - check A1c  - start low dose SSI      Generalized weakness  - PT/OT consulted     Discussed plan of care with nursing. Consider palliative care/hospice evaluation, although consulted previous admission and wanted to remain a full code. DVT Prophylaxis: Lovenox   Diet: ADULT DIET; Regular; 5 carb choices (75 gm/meal);  Low Fat/Low Chol/High Fiber/JOEL; Low Sodium (2 gm)  Code Status: Full Code     PANDA Allan - GISELLA Kenney MD 6/8/2023 10:45 AM

## 2023-06-09 LAB
ANION GAP SERPL CALCULATED.3IONS-SCNC: 13 MMOL/L (ref 3–16)
BASOPHILS # BLD: 0 K/UL (ref 0–0.2)
BASOPHILS NFR BLD: 0 %
BUN SERPL-MCNC: 15 MG/DL (ref 7–20)
CALCIUM SERPL-MCNC: 9.2 MG/DL (ref 8.3–10.6)
CHLORIDE SERPL-SCNC: 96 MMOL/L (ref 99–110)
CO2 SERPL-SCNC: 29 MMOL/L (ref 21–32)
CREAT SERPL-MCNC: <0.5 MG/DL (ref 0.6–1.2)
DEPRECATED RDW RBC AUTO: 16.5 % (ref 12.4–15.4)
EOSINOPHIL # BLD: 0 K/UL (ref 0–0.6)
EOSINOPHIL NFR BLD: 0 %
EST. AVERAGE GLUCOSE BLD GHB EST-MCNC: 119.8 MG/DL
GFR SERPLBLD CREATININE-BSD FMLA CKD-EPI: >60 ML/MIN/{1.73_M2}
GLUCOSE BLD-MCNC: 107 MG/DL (ref 70–99)
GLUCOSE BLD-MCNC: 110 MG/DL (ref 70–99)
GLUCOSE BLD-MCNC: 203 MG/DL (ref 70–99)
GLUCOSE BLD-MCNC: 245 MG/DL (ref 70–99)
GLUCOSE SERPL-MCNC: 142 MG/DL (ref 70–99)
HBA1C MFR BLD: 5.8 %
HCT VFR BLD AUTO: 26.6 % (ref 36–48)
HGB BLD-MCNC: 7.9 G/DL (ref 12–16)
HYPOCHROMIA BLD QL SMEAR: ABNORMAL
LYMPHOCYTES # BLD: 1.2 K/UL (ref 1–5.1)
LYMPHOCYTES NFR BLD: 9.6 %
MCH RBC QN AUTO: 22.6 PG (ref 26–34)
MCHC RBC AUTO-ENTMCNC: 29.8 G/DL (ref 31–36)
MCV RBC AUTO: 75.8 FL (ref 80–100)
MONOCYTES # BLD: 0.9 K/UL (ref 0–1.3)
MONOCYTES NFR BLD: 7.1 %
NEUTROPHILS # BLD: 10.4 K/UL (ref 1.7–7.7)
NEUTROPHILS NFR BLD: 83.3 %
PERFORMED ON: ABNORMAL
PLATELET # BLD AUTO: 191 K/UL (ref 135–450)
PLATELET BLD QL SMEAR: ADEQUATE
PMV BLD AUTO: 7.4 FL (ref 5–10.5)
POIKILOCYTOSIS BLD QL SMEAR: ABNORMAL
POLYCHROMASIA BLD QL SMEAR: ABNORMAL
POTASSIUM SERPL-SCNC: 4.3 MMOL/L (ref 3.5–5.1)
RBC # BLD AUTO: 3.51 M/UL (ref 4–5.2)
SLIDE REVIEW: ABNORMAL
SODIUM SERPL-SCNC: 138 MMOL/L (ref 136–145)
SPHEROCYTES BLD QL SMEAR: ABNORMAL
WBC # BLD AUTO: 12.5 K/UL (ref 4–11)

## 2023-06-09 PROCEDURE — 6370000000 HC RX 637 (ALT 250 FOR IP): Performed by: NURSE PRACTITIONER

## 2023-06-09 PROCEDURE — 94669 MECHANICAL CHEST WALL OSCILL: CPT

## 2023-06-09 PROCEDURE — 2700000000 HC OXYGEN THERAPY PER DAY

## 2023-06-09 PROCEDURE — 2580000003 HC RX 258: Performed by: STUDENT IN AN ORGANIZED HEALTH CARE EDUCATION/TRAINING PROGRAM

## 2023-06-09 PROCEDURE — 6370000000 HC RX 637 (ALT 250 FOR IP): Performed by: INTERNAL MEDICINE

## 2023-06-09 PROCEDURE — 80048 BASIC METABOLIC PNL TOTAL CA: CPT

## 2023-06-09 PROCEDURE — 6360000002 HC RX W HCPCS: Performed by: INTERNAL MEDICINE

## 2023-06-09 PROCEDURE — 97535 SELF CARE MNGMENT TRAINING: CPT

## 2023-06-09 PROCEDURE — 6370000000 HC RX 637 (ALT 250 FOR IP): Performed by: STUDENT IN AN ORGANIZED HEALTH CARE EDUCATION/TRAINING PROGRAM

## 2023-06-09 PROCEDURE — 94761 N-INVAS EAR/PLS OXIMETRY MLT: CPT

## 2023-06-09 PROCEDURE — 99233 SBSQ HOSP IP/OBS HIGH 50: CPT | Performed by: INTERNAL MEDICINE

## 2023-06-09 PROCEDURE — 36415 COLL VENOUS BLD VENIPUNCTURE: CPT

## 2023-06-09 PROCEDURE — 85025 COMPLETE CBC W/AUTO DIFF WBC: CPT

## 2023-06-09 PROCEDURE — 2060000000 HC ICU INTERMEDIATE R&B

## 2023-06-09 PROCEDURE — 94640 AIRWAY INHALATION TREATMENT: CPT

## 2023-06-09 RX ORDER — PIRFENIDONE 267 MG/1
267 TABLET, FILM COATED ORAL 3 TIMES DAILY
Status: DISCONTINUED | OUTPATIENT
Start: 2023-06-09 | End: 2023-06-10 | Stop reason: HOSPADM

## 2023-06-09 RX ORDER — PIRFENIDONE 267 MG/1
1 TABLET, FILM COATED ORAL 3 TIMES DAILY
Status: DISCONTINUED | OUTPATIENT
Start: 2023-06-09 | End: 2023-06-09

## 2023-06-09 RX ADMIN — MIRTAZAPINE 30 MG: 30 TABLET, FILM COATED ORAL at 20:25

## 2023-06-09 RX ADMIN — ENOXAPARIN SODIUM 40 MG: 100 INJECTION SUBCUTANEOUS at 17:40

## 2023-06-09 RX ADMIN — NADOLOL 20 MG: 40 TABLET ORAL at 09:39

## 2023-06-09 RX ADMIN — IPRATROPIUM BROMIDE AND ALBUTEROL SULFATE 1 DOSE: .5; 2.5 SOLUTION RESPIRATORY (INHALATION) at 00:00

## 2023-06-09 RX ADMIN — IPRATROPIUM BROMIDE AND ALBUTEROL SULFATE 1 DOSE: .5; 2.5 SOLUTION RESPIRATORY (INHALATION) at 23:51

## 2023-06-09 RX ADMIN — SODIUM CHLORIDE, PRESERVATIVE FREE 10 ML: 5 INJECTION INTRAVENOUS at 20:25

## 2023-06-09 RX ADMIN — SERTRALINE 100 MG: 100 TABLET, FILM COATED ORAL at 09:39

## 2023-06-09 RX ADMIN — AZITHROMYCIN 500 MG: 250 TABLET, FILM COATED ORAL at 09:39

## 2023-06-09 RX ADMIN — GUAIFENESIN 600 MG: 600 TABLET, EXTENDED RELEASE ORAL at 20:25

## 2023-06-09 RX ADMIN — IPRATROPIUM BROMIDE AND ALBUTEROL SULFATE 1 DOSE: .5; 2.5 SOLUTION RESPIRATORY (INHALATION) at 15:34

## 2023-06-09 RX ADMIN — ATORVASTATIN CALCIUM 40 MG: 40 TABLET, FILM COATED ORAL at 09:39

## 2023-06-09 RX ADMIN — IPRATROPIUM BROMIDE AND ALBUTEROL SULFATE 1 DOSE: .5; 2.5 SOLUTION RESPIRATORY (INHALATION) at 07:46

## 2023-06-09 RX ADMIN — HYDROXYZINE HYDROCHLORIDE 10 MG: 10 TABLET ORAL at 19:18

## 2023-06-09 RX ADMIN — POTASSIUM CHLORIDE 20 MEQ: 1500 TABLET, EXTENDED RELEASE ORAL at 09:40

## 2023-06-09 RX ADMIN — INSULIN LISPRO 1 UNITS: 100 INJECTION, SOLUTION INTRAVENOUS; SUBCUTANEOUS at 16:47

## 2023-06-09 RX ADMIN — DILTIAZEM HYDROCHLORIDE 240 MG: 240 CAPSULE, COATED, EXTENDED RELEASE ORAL at 09:40

## 2023-06-09 RX ADMIN — GUAIFENESIN 600 MG: 600 TABLET, EXTENDED RELEASE ORAL at 09:40

## 2023-06-09 RX ADMIN — BENZONATATE 100 MG: 100 CAPSULE ORAL at 13:47

## 2023-06-09 RX ADMIN — PIRFENIDONE 267 MG: 267 TABLET, FILM COATED ORAL at 20:26

## 2023-06-09 RX ADMIN — SALINE NASAL SPRAY 1 SPRAY: 1.5 SOLUTION NASAL at 16:47

## 2023-06-09 RX ADMIN — SODIUM CHLORIDE, PRESERVATIVE FREE 10 ML: 5 INJECTION INTRAVENOUS at 10:03

## 2023-06-09 RX ADMIN — PANTOPRAZOLE SODIUM 40 MG: 40 TABLET, DELAYED RELEASE ORAL at 05:08

## 2023-06-09 RX ADMIN — IPRATROPIUM BROMIDE AND ALBUTEROL SULFATE 1 DOSE: .5; 2.5 SOLUTION RESPIRATORY (INHALATION) at 11:42

## 2023-06-09 RX ADMIN — PIRFENIDONE 267 MG: 267 TABLET, FILM COATED ORAL at 10:38

## 2023-06-09 RX ADMIN — BENZONATATE 100 MG: 100 CAPSULE ORAL at 19:18

## 2023-06-09 RX ADMIN — IPRATROPIUM BROMIDE AND ALBUTEROL SULFATE 1 DOSE: .5; 2.5 SOLUTION RESPIRATORY (INHALATION) at 20:01

## 2023-06-09 RX ADMIN — LEVOTHYROXINE SODIUM 100 MCG: 100 TABLET ORAL at 05:08

## 2023-06-09 NOTE — DISCHARGE INSTR - COC
Hyponatremia E87.1    Numbness and tingling in left hand R20.0, R20.2    Ulnar neuropathy at elbow of left upper extremity G56.22    Left wrist pain M25.532    Left wrist tendinitis M77.8    GERD (gastroesophageal reflux disease) L12.4    Toxic metabolic encephalopathy R86.9    VIRGINIE (acute kidney injury) ([facility] [address] N17.9    Lumbar radiculopathy M54.16    Rib pain on left side R07.81    Elevated lactic acid level R79.89    Fracture of multiple ribs of left side S22.42XA    Hypomagnesemia E83.42    Depression F32. A    Chronic diastolic congestive heart failure ([facility]) I50.32    Burst fracture of lumbar vertebra ([facility] [address] S32.001A    H/O Spinal surgery Z98.890    Severe malnutrition ([facility] [address] E43    Pneumonia due to COVID-19 virus U07.1, J12.82    COVID-19 U07.1    Diarrhea R19.7    PSVT (paroxysmal supraventricular tachycardia) ([facility]) I47.1    PAF (paroxysmal atrial fibrillation) ([facility]) I48.0    SVT (supraventricular tachycardia) ([facility]) I47.1    Dependence on supplemental oxygen Z99.81    Atrial fibrillation with RVR ([facility]) I48.91    Chronic hypoxemic respiratory failure ([facility]) J96.11    History of COPD Z87.09    Acute on chronic respiratory failure with hypoxia and hypercapnia ([facility]) J96.21, J96.22    Acute on chronic respiratory failure with hypoxia ([facility]) J96.21    Acute respiratory failure with hypoxia ([facility]) J96.01    ILD (interstitial lung disease) ([facility]) J84.9       Isolation/Infection:   Isolation            No Isolation          Patient Infection Status       Infection Onset Added Last Indicated Last Indicated By Review Planned Expiration Resolved Resolved By    None active    Resolved    COVID-19 (Rule Out) 06/07/23 06/07/23 06/07/23 COVID-19, Rapid (Ordered)   06/07/23 Rule-Out Test Resulted    COVID-19 (Rule Out) 04/09/23 04/09/23 04/09/23 COVID-19, Rapid (Ordered)   04/09/23 Rule-Out Test Resulted    COVID-19 (Rule Out) 03/25/23 03/25/23 03/25/23 Respiratory Panel, Molecular, with COVID-19 (Restricted: peds pts or suitable

## 2023-06-10 VITALS
RESPIRATION RATE: 18 BRPM | DIASTOLIC BLOOD PRESSURE: 78 MMHG | OXYGEN SATURATION: 100 % | WEIGHT: 124 LBS | SYSTOLIC BLOOD PRESSURE: 125 MMHG | TEMPERATURE: 97.8 F | BODY MASS INDEX: 21.97 KG/M2 | HEIGHT: 63 IN | HEART RATE: 77 BPM

## 2023-06-10 LAB
ANION GAP SERPL CALCULATED.3IONS-SCNC: 7 MMOL/L (ref 3–16)
BASOPHILS # BLD: 0 K/UL (ref 0–0.2)
BASOPHILS NFR BLD: 0.1 %
BUN SERPL-MCNC: 15 MG/DL (ref 7–20)
CALCIUM SERPL-MCNC: 9.3 MG/DL (ref 8.3–10.6)
CHLORIDE SERPL-SCNC: 98 MMOL/L (ref 99–110)
CO2 SERPL-SCNC: 34 MMOL/L (ref 21–32)
CREAT SERPL-MCNC: <0.5 MG/DL (ref 0.6–1.2)
DEPRECATED RDW RBC AUTO: 16.3 % (ref 12.4–15.4)
EOSINOPHIL # BLD: 0 K/UL (ref 0–0.6)
EOSINOPHIL NFR BLD: 0 %
GFR SERPLBLD CREATININE-BSD FMLA CKD-EPI: >60 ML/MIN/{1.73_M2}
GLUCOSE BLD-MCNC: 142 MG/DL (ref 70–99)
GLUCOSE BLD-MCNC: 171 MG/DL (ref 70–99)
GLUCOSE SERPL-MCNC: 177 MG/DL (ref 70–99)
HCT VFR BLD AUTO: 29.3 % (ref 36–48)
HGB BLD-MCNC: 8.9 G/DL (ref 12–16)
LYMPHOCYTES # BLD: 0.6 K/UL (ref 1–5.1)
LYMPHOCYTES NFR BLD: 4.7 %
MCH RBC QN AUTO: 23 PG (ref 26–34)
MCHC RBC AUTO-ENTMCNC: 30.5 G/DL (ref 31–36)
MCV RBC AUTO: 75.2 FL (ref 80–100)
MONOCYTES # BLD: 0.5 K/UL (ref 0–1.3)
MONOCYTES NFR BLD: 4.1 %
NEUTROPHILS # BLD: 11.2 K/UL (ref 1.7–7.7)
NEUTROPHILS NFR BLD: 91.1 %
PERFORMED ON: ABNORMAL
PERFORMED ON: ABNORMAL
PLATELET # BLD AUTO: 194 K/UL (ref 135–450)
PMV BLD AUTO: 7.7 FL (ref 5–10.5)
POTASSIUM SERPL-SCNC: 3.7 MMOL/L (ref 3.5–5.1)
RBC # BLD AUTO: 3.9 M/UL (ref 4–5.2)
SODIUM SERPL-SCNC: 139 MMOL/L (ref 136–145)
WBC # BLD AUTO: 12.3 K/UL (ref 4–11)

## 2023-06-10 PROCEDURE — 94669 MECHANICAL CHEST WALL OSCILL: CPT

## 2023-06-10 PROCEDURE — 80048 BASIC METABOLIC PNL TOTAL CA: CPT

## 2023-06-10 PROCEDURE — 6370000000 HC RX 637 (ALT 250 FOR IP): Performed by: INTERNAL MEDICINE

## 2023-06-10 PROCEDURE — 6370000000 HC RX 637 (ALT 250 FOR IP): Performed by: NURSE PRACTITIONER

## 2023-06-10 PROCEDURE — 2580000003 HC RX 258: Performed by: STUDENT IN AN ORGANIZED HEALTH CARE EDUCATION/TRAINING PROGRAM

## 2023-06-10 PROCEDURE — 99233 SBSQ HOSP IP/OBS HIGH 50: CPT | Performed by: INTERNAL MEDICINE

## 2023-06-10 PROCEDURE — 6370000000 HC RX 637 (ALT 250 FOR IP): Performed by: STUDENT IN AN ORGANIZED HEALTH CARE EDUCATION/TRAINING PROGRAM

## 2023-06-10 PROCEDURE — 97535 SELF CARE MNGMENT TRAINING: CPT

## 2023-06-10 PROCEDURE — 2700000000 HC OXYGEN THERAPY PER DAY

## 2023-06-10 PROCEDURE — 94761 N-INVAS EAR/PLS OXIMETRY MLT: CPT

## 2023-06-10 PROCEDURE — 94640 AIRWAY INHALATION TREATMENT: CPT

## 2023-06-10 PROCEDURE — 36415 COLL VENOUS BLD VENIPUNCTURE: CPT

## 2023-06-10 PROCEDURE — 85025 COMPLETE CBC W/AUTO DIFF WBC: CPT

## 2023-06-10 RX ORDER — FERROUS SULFATE 325(65) MG
325 TABLET ORAL
Qty: 60 TABLET | Refills: 1 | Status: SHIPPED | OUTPATIENT
Start: 2023-06-10

## 2023-06-10 RX ORDER — PREDNISONE 10 MG/1
TABLET ORAL
Qty: 30 TABLET | Refills: 0 | Status: SHIPPED | OUTPATIENT
Start: 2023-06-10

## 2023-06-10 RX ORDER — LEVOTHYROXINE SODIUM 0.1 MG/1
100 TABLET ORAL DAILY
Qty: 30 TABLET | Refills: 3 | Status: SHIPPED | OUTPATIENT
Start: 2023-06-11

## 2023-06-10 RX ADMIN — PIRFENIDONE 267 MG: 267 TABLET, FILM COATED ORAL at 10:18

## 2023-06-10 RX ADMIN — IPRATROPIUM BROMIDE AND ALBUTEROL SULFATE 1 DOSE: .5; 2.5 SOLUTION RESPIRATORY (INHALATION) at 07:50

## 2023-06-10 RX ADMIN — LORAZEPAM 0.5 MG: 0.5 TABLET ORAL at 11:51

## 2023-06-10 RX ADMIN — GUAIFENESIN 600 MG: 600 TABLET, EXTENDED RELEASE ORAL at 10:08

## 2023-06-10 RX ADMIN — AZITHROMYCIN 500 MG: 250 TABLET, FILM COATED ORAL at 10:08

## 2023-06-10 RX ADMIN — PANTOPRAZOLE SODIUM 40 MG: 40 TABLET, DELAYED RELEASE ORAL at 05:21

## 2023-06-10 RX ADMIN — LEVOTHYROXINE SODIUM 100 MCG: 100 TABLET ORAL at 05:21

## 2023-06-10 RX ADMIN — NADOLOL 20 MG: 40 TABLET ORAL at 10:08

## 2023-06-10 RX ADMIN — BENZONATATE 100 MG: 100 CAPSULE ORAL at 10:25

## 2023-06-10 RX ADMIN — POTASSIUM CHLORIDE 20 MEQ: 1500 TABLET, EXTENDED RELEASE ORAL at 10:08

## 2023-06-10 RX ADMIN — SODIUM CHLORIDE, PRESERVATIVE FREE 10 ML: 5 INJECTION INTRAVENOUS at 10:07

## 2023-06-10 RX ADMIN — SERTRALINE 100 MG: 100 TABLET, FILM COATED ORAL at 10:09

## 2023-06-10 RX ADMIN — DILTIAZEM HYDROCHLORIDE 240 MG: 240 CAPSULE, COATED, EXTENDED RELEASE ORAL at 10:09

## 2023-06-10 RX ADMIN — ATORVASTATIN CALCIUM 40 MG: 40 TABLET, FILM COATED ORAL at 10:09

## 2023-06-10 RX ADMIN — IPRATROPIUM BROMIDE AND ALBUTEROL SULFATE 1 DOSE: .5; 2.5 SOLUTION RESPIRATORY (INHALATION) at 11:26

## 2023-06-10 ASSESSMENT — PAIN SCALES - GENERAL
PAINLEVEL_OUTOF10: 0
PAINLEVEL_OUTOF10: 0

## 2023-06-10 NOTE — CARE COORDINATION
CASE MANAGEMENT DISCHARGE SUMMARY      Discharge to: Home    IMM given: (date) 6/9/2023    New Durable Medical Equipment ordered/agency: [facility] for [facility]; [facility]. Transportation:    Family/car: Private auto   Confirmed discharge plan with:   Patient: yes   Family:  yes   Name: Libby Pritchett daughter and Rony spouse Contact number: [phone]      RN, name: Epifanio Rafals    Note: Discharging nurse to complete CAROLANN, reconcile AVS, and place final copy with patient's discharge packet. RN to ensure that written prescriptions for Ferrous sulfate Prednisone and synthroid which will be at [facility] for pickup. Bert Odell RN
Case Management/Follow up:    Chart reviewed for length of stay. Hospital day #  2 Unit:  PCU    Diagnosis and current status as per MD progress: Acute respiratory failure with hypoxia ([address]    Anticipated d/c date: 1-2    Expected plan for discharge: Home with family    Potential barriers: None    Confirmed plan with patient and/or family:Yes    Comments: Patient has [facility] on board for . Family cares for all other needs through [address] [place] [address] Awaiting confirmation form [facility] on delivery date and time. Home 02 in place at home prior to admission:  Patient currently with [facility].    Atlee Kayser, RN
outcomes: Family support    Barriers to discharge: none     Additional Case Management Notes: Reviewed chart, met with pt at [facility], role of CM explained. Pt states lives home with spouse, plan to return home, spouse to transport. Pt active with [facility].  has HHA 3 days/ week, 5 hr/day through [place] ([facility]). Daughter is paid care giver through "[employer]. Also  receives SN and OT through [facility].  has Home O2 through [facility]. Will continue to follow for additional needs. The Plan for Transition of Care is related to the following treatment goals of ILD (interstitial lung disease) ([facility] [address] [J84.9]  Acute on chronic respiratory failure with hypoxia ([address] [J96.21]  Acute respiratory failure with hypoxia ([facility] [address] [P93.68]    IF APPLICABLE: The Patient and/or patient representative Kelly Jones and her family were provided with a choice of provider and agrees with the discharge plan. Freedom of choice list with basic dialogue that supports the patient's individualized plan of care/goals and shares the quality data associated with the providers was provided to:     Patient Representative Name:       The Patient and/or Patient Representative Agree with the Discharge Plan?       Carlos Ballesteros RN  Case Management Department  Ph: [phone] Fax: [phone]

## 2023-06-10 NOTE — PROGRESS NOTES
AM assessment completed. Scheduled medications given per MAR. VSS 6 liter NC with non-rebreather at bedside for when patient is short of breath from exertion, A/O x4, patient does not appear in any distress and denies any needs at this time. Call light in reach, will monitor, bed alarm on.
HEART FAILURE CARE PLAN:    Comorbidities Reviewed: Yes   Patient has a past medical history of A-fib ([facility] Acute cystitis without hematuria, Anxiety, CHF (congestive heart failure) ([facility] COPD (chronic obstructive pulmonary disease) ([facility] Cryptogenic organizing pneumonia ([facility] Depression, Diabetes mellitus ([facility] GERD (gastroesophageal reflux disease), Hyperlipidemia, Hypertension, On home oxygen therapy, Rash, and Thyroid disease. ECHOCARDIOGRAM Reviewed: Yes   Patient's Ejection Fraction (EF) is greater than 40%    Weights Reviewed: Yes   Admission weight: 108 lb (49 kg)   Wt Readings from Last 3 Encounters:   06/08/23 123 lb 4.8 oz (55.9 kg)   05/07/23 108 lb 1.6 oz (49 kg)   04/30/23 110 lb (49.9 kg)     Intake & Output Reviewed: Yes     Intake/Output Summary (Last 24 hours) at 6/10/2023 0053  Last data filed at 6/9/2023 1751  Gross per 24 hour   Intake 480 ml   Output --   Net 480 ml     Medications Reviewed: Yes   SCHEDULED HOSPITAL MEDICATIONS:   Pirfenidone  267 mg Oral TID    atorvastatin  40 mg Oral Daily    dilTIAZem  240 mg Oral Daily    mirtazapine  30 mg Oral Nightly    nadolol  20 mg Oral Daily    pantoprazole  40 mg Oral Daily    potassium chloride  20 mEq Oral Daily with breakfast    sertraline  100 mg Oral Daily    sodium chloride flush  5-40 mL IntraVENous 2 times per day    insulin lispro  0-4 Units SubCUTAneous TID WC    insulin lispro  0-4 Units SubCUTAneous Nightly    levothyroxine  100 mcg Oral Daily    enoxaparin  40 mg SubCUTAneous QPM    guaiFENesin  600 mg Oral BID    ipratropium 0.5 mg-albuterol 2.5 mg  1 Dose Inhalation Q4H WA    azithromycin  500 mg Oral Daily    methylPREDNISolone sodium (PF)  40 mg IntraVENous Q12H     ACE/ARB/ARNI is REQUIRED for EF </= 64% SYSTOLIC FAILURE:   ACE[de-identified] None  ARB[de-identified] None  ARNI[de-identified] None    Evidenced-Based Beta Blocker is REQUIRED for EF </= 47% SYSTOLIC FAILURE:   [de-identified] None    Diuretics:  [de-identified] None    Diet Reviewed: Yes   ADULT DIET;  Regular; 5 carb
[facility].
Nursing handoff to Chelly Brenner RN.   Abdelrahman Pelayo RN
[facility]                                 Pulmonary Consult /Progress Note :                                                                    CC worsening SOB     HISTORY OF PRESENT ILLNESS:     Still on high flow O2  and down 6  L and slowly weaning doing great  Want to go home   [place]  Family at bed side  No fever or chills  No chest pain     PHYSICAL EXAM:  Blood pressure 137/74, pulse 78, temperature 97.3 °F (36.3 °C), temperature source Oral, resp. rate 18, height 5' 3\" (1.6 m), weight 124 lb (56.2 kg), SpO2 97 %, not currently breastfeeding.' on 8 L  Gen: Bilateral distress. Ill-appearing  Eyes: PERRL. No sclera icterus. No conjunctival injection. ENT: No discharge. Pharynx clear. Neck: Trachea midline. No obvious mass. Resp: + Accessory muscle use. Bilateral crackles. No wheezes. No rhonchi. No dullness on percussion. CV: Regular rate. Regular rhythm. No murmur or rub. No edema. GI: Non-tender. Non-distended. No hernia. Skin: Warm and dry. No nodule on exposed extremities. Lymph: No cervical LAD. No supraclavicular LAD. M/S: No cyanosis. No joint deformity. No clubbing. Neuro: Awake. Alert. Moves all four extremities. Psych: Oriented x 3. No anxiety. LABS:  CBC:   Recent Labs     06/08/23  0441 06/09/23  0500 06/10/23  0507   WBC 8.1 12.5* 12.3*   HGB 9.6* 7.9* 8.9*   HCT 30.7* 26.6* 29.3*   MCV 74.5* 75.8* 75.2*    191 194       BMP:   Recent Labs     06/08/23  0441 06/09/23  0449 06/10/23  0507    138 139   K 3.5 4.3 3.7   CL 97* 96* 98*   CO2 32 29 34*   BUN 12 15 15   CREATININE <0.5* <0.5* <0.5*       LIVER PROFILE:   Recent Labs     06/07/23  2147   AST 23   ALT 14   BILITOT 0.3   ALKPHOS 76       PT/INR:   Recent Labs     06/08/23 0441   PROTIME 14.7   INR 1.15       APTT: No results for input(s): APTT in the last 72 hours.   UA:No results for input(s): NITRITE,
Patient admitted to room 321 from [place]. Patient oriented to room, call light, bed rails, phone, lights and bathroom. Patient instructed about the schedule of the day including: vital sign frequency, lab draws, possible tests, frequency of MD and staff rounds, daily weights, I &O's and prescribed diet. PCU Telemetry box in place, patient aware of placement and reason. Bed locked, in lowest position, side rails up 2/4, call light within reach. Recliner Assessment  Patient is not able to demonstrated the ability to move from a reclining position to an upright position within the recliner. however patient is alert, oriented and able to provide informed consent       4 Eyes Skin Assessment     The patient is being assess for   Admission    I agree that 2 RN's have performed a thorough Head to Toe Skin Assessment on the patient. ALL assessment sites listed below have been assessed. Areas assessed for pressure by both nurses:   [x]   Head, Face, and Ears   [x]   Shoulders, Back, and Chest, Abdomen  [x]   Arms, Elbows, and Hands   [x]   Coccyx, Sacrum, and Ischium  [x]   Legs, Feet, and Heels   Scattered bruising     Skin Assessed Under all Medical Devices by both nurses:  O2 device tubing                         **SHARE this note so that the co-signing nurse is able to place an eSignature**    Co-signer eSignature: Electronically signed by Alphonsa Boas, RN on 6/8/23 at 2:06 AM EDT    Does the Patient have Skin Breakdown related to pressure?   No              Amos Prevention initiated:  No   Wound Care Orders initiated:  No      [facility] nurse consulted for Pressure Injury (Stage 3,4, Unstageable, DTI, NWPT, Complex wounds)and New or Established Ostomies:  NA      Primary Nurse eSignature: Electronically signed by Francisco Parra RN on 6/8/23 at 1:49 AM EDT
Patient educated on discharge instructions as well as new medications use, dosage, administration and possible side effects. Patient verified knowledge. IV removed without difficulty and dry dressing in place. Telemetry monitor removed and returned to [facility]. Pt left facility in stable condition to Home with all of their personal belongings.
Progress Note    Admit Date:  6/7/2023    Subjective:  Ms. Meghann Andrade is feeling some better but not ready to go home. She is on 6 l    Objective:   /62   Pulse 71   Temp 97.7 °F (36.5 °C) (Oral)   Resp 18   Ht 5' 3\" (1.6 m)   Wt 123 lb 4.8 oz (55.9 kg)   SpO2 100%   BMI 21.84 kg/m²        Intake/Output Summary (Last 24 hours) at 6/9/2023 6261  Last data filed at 6/8/2023 1803  Gross per 24 hour   Intake 472 ml   Output 550 ml   Net -78 ml       Physical Exam:    General appearance: alert, appears stated age and cooperative  Head: Normocephalic, without obvious abnormality, atraumatic  Eyes: conjunctivae/corneas clear. PERRL, EOM's intact.   Neck: no adenopathy, no carotid bruit, no JVD, supple, symmetrical, trachea midline and thyroid not enlarged, symmetric, no tenderness/mass/nodules  Lungs: clear to auscultation bilaterally  Heart: regular rate and rhythm, S1, S2 normal, no murmur, click, rub or gallop  Abdomen: soft, non-tender; bowel sounds normal; no masses,  no organomegaly  Extremities: extremities normal, atraumatic, no cyanosis or edema  Pulses: 2+ and symmetric  Skin: Skin color, texture, turgor normal. No rashes or lesions  Neurologic: Grossly normal    Scheduled Meds:   atorvastatin  40 mg Oral Daily    dilTIAZem  240 mg Oral Daily    mirtazapine  30 mg Oral Nightly    nadolol  20 mg Oral Daily    pantoprazole  40 mg Oral Daily    Pirfenidone  1 tablet Oral TID    potassium chloride  20 mEq Oral Daily with breakfast    sertraline  100 mg Oral Daily    sodium chloride flush  5-40 mL IntraVENous 2 times per day    insulin lispro  0-4 Units SubCUTAneous TID WC    insulin lispro  0-4 Units SubCUTAneous Nightly    levothyroxine  100 mcg Oral Daily    enoxaparin  40 mg SubCUTAneous QPM    guaiFENesin  600 mg Oral BID    ipratropium 0.5 mg-albuterol 2.5 mg  1 Dose Inhalation Q4H WA    azithromycin  500 mg Oral Daily    methylPREDNISolone sodium (PF)  40 mg IntraVENous Q12H       Continuous
Progress Note    Admit Date:  6/7/2023    Subjective:  Ms. Stevenson Keizer is feeling some better   Back to home o2 of 6 L   HR stable  No fevers    Was recently referred to [facility] for lung transplant but has not seen them yet    Objective:   /74   Pulse 78   Temp 97.3 °F (36.3 °C) (Oral)   Resp 18   Ht 5' 3\" (1.6 m)   Wt 124 lb (56.2 kg)   SpO2 97%   BMI 21.97 kg/m²        Intake/Output Summary (Last 24 hours) at 6/10/2023 6190  Last data filed at 6/10/2023 5260  Gross per 24 hour   Intake 640 ml   Output --   Net 640 ml         Physical Exam:      thin elderly female chronically ill appearing  Awake, alert and oriented. Appears to be not in any distress  Mucous Membranes:  Pink , anicteric  Neck: No JVD, no carotid bruit, no thyromegaly  Chest:  improved kenny air entry   kenny bases with scattered chronic crackles ,  Cardiovascular:  RRR S1S2 heard, no murmurs or gallops  Abdomen:  Soft, undistended, non tender, no organomegaly, BS present  Extremities: No edema or cyanosis.  Distal pulses well felt  Neurological : no focal deficits with gen weakness      Scheduled Meds:   Pirfenidone  267 mg Oral TID    atorvastatin  40 mg Oral Daily    dilTIAZem  240 mg Oral Daily    mirtazapine  30 mg Oral Nightly    nadolol  20 mg Oral Daily    pantoprazole  40 mg Oral Daily    potassium chloride  20 mEq Oral Daily with breakfast    sertraline  100 mg Oral Daily    sodium chloride flush  5-40 mL IntraVENous 2 times per day    insulin lispro  0-4 Units SubCUTAneous TID     insulin lispro  0-4 Units SubCUTAneous Nightly    levothyroxine  100 mcg Oral Daily    enoxaparin  40 mg SubCUTAneous QPM    guaiFENesin  600 mg Oral BID    ipratropium 0.5 mg-albuterol 2.5 mg  1 Dose Inhalation Q4H WA    azithromycin  500 mg Oral Daily    methylPREDNISolone sodium (PF)  40 mg IntraVENous Q12H       Continuous Infusions:   sodium chloride      dextrose         PRN Meds:  sodium chloride, benzonatate, hydrOXYzine HCl, LORazepam, traMADol,
Pt awake in bed. Assessment completed and medications given per MAR. VS as charted in flowsheet. A&Ox4. Pt denies any further needs at this time. Call light in reach and bed in lowest position.
RT Inhaler-Nebulizer Bronchodilator Protocol Note    There is a bronchodilator order in the chart from a provider indicating to follow the RT Bronchodilator Protocol and there is an Initiate RT Inhaler-Nebulizer Bronchodilator Protocol order as well (see protocol at bottom of note). CXR Findings:  XR CHEST PORTABLE    Result Date: 6/7/2023  No acute abnormality. The findings from the last RT Protocol Assessment were as follows:   History Pulmonary Disease: Chronic pulmonary disease  Respiratory Pattern: Dyspnea on exertion or RR 21-25 bpm  Breath Sounds: Slightly diminished and/or crackles  Cough: Strong, spontaneous, non-productive  Indication for Bronchodilator Therapy: Decreased or absent breath sounds  Bronchodilator Assessment Score: 6    Aerosolized bronchodilator medication orders have been revised according to the RT Inhaler-Nebulizer Bronchodilator Protocol below. Respiratory Therapist to perform RT Therapy Protocol Assessment initially then follow the protocol. Repeat RT Therapy Protocol Assessment PRN for score 0-3 or on second treatment, BID, and PRN for scores above 3. No Indications - adjust the frequency to every 6 hours PRN wheezing or bronchospasm, if no treatments needed after 48 hours then discontinue using Per Protocol order mode. If indication present, adjust the RT bronchodilator orders based on the Bronchodilator Assessment Score as indicated below. Use Inhaler orders unless patient has one or more of the following: on home nebulizer, not able to hold breath for 10 seconds, is not alert and oriented, cannot activate and use MDI correctly, or respiratory rate 25 breaths per minute or more, then use the equivalent nebulizer order(s) with same Frequency and PRN reasons based on the score. If a patient is on this medication at home then do not decrease Frequency below that used at home.     0-3 - enter or revise RT bronchodilator order(s) to equivalent RT Bronchodilator
Report given to [facility]. Pt stable, care transferred at this time.  Jennie Ritter RN
Resting in bed with eyes closed & respirations WNL on 6L HFNC. Call in easy reach. Bed alarm on for safety. Continue to monitor closely.   Fab Obregon RN
Writer called  at this time and left a message notifying them that AVAPS needed to be obtained.
RBCUA, MUCUS, TRICHOMONAS, YEAST, BACTERIA, CLARITYU, SPECGRAV, LEUKOCYTESUR, UROBILINOGEN, BILIRUBINUR, BLOODU, GLUCOSEU, AMORPHOUS in the last 72 hours. Invalid input(s): KETONESU  No results for input(s): PHART, MHG7LVR, PO2ART in the last 72 hours. Microbiology:  6/7 COVID and influenza not detected  6/7 respiratory    Imaging:  Chest x-ray 6/7 imaging was reviewed by me and showed   No acute abnormalities    CT chest 5/2/23   1. No pulmonary embolism. 2. Advanced pattern of emphysema and chronic interstitial change that is  stable. 3. Resolution of airspace changes seen on the prior recent CT.       ASSESSMENT:  Acute hypoxemic respiratory failure   ILD exacerbation-cryptogenic organizing pneumonia and possible NSIP/chronic HP on biopsy  Emphysema exacerbation   Chronic respiratory failure normally 4 L at rest and 8 L on exertion  Life long nonsmoker        PLAN:  Supplemental oxygen to maintain SaO2 >92%; wean as tolerated  noninvasive ventilation,arranged on discharge  Intensive inhaled bronchodilator therapy   oral prednisone taper over 10 days   Z-Liang  Continue home pirfenidone  Acapella and Mucinex  Patient to follow-up with [facility] transplant center as an outpatient ,  Wor up for trigger ,seems so far no etiology and she is  better      Patient with chronic respiratory failure consequent to COPD and ILD with recurrent admissions. Condition is life threatening where interruption of respiratory support would quickly lead to serous harm or death. Patient.  I recommend   AVAPS   IPAP max 20  IPAP min 10  EPAP 4  RR 12  TV 6 ml per kg of IBW
RT Bronchodilator order with Frequency of every 4 hours PRN for wheezing or increased work of breathing using Per Protocol order mode. 4-6 - enter or revise RT Bronchodilator order(s) to two equivalent RT bronchodilator orders with one order with BID Frequency and one order with Frequency of every 4 hours PRN wheezing or increased work of breathing using Per Protocol order mode. 7-10 - enter or revise RT Bronchodilator order(s) to two equivalent RT bronchodilator orders with one order with TID Frequency and one order with Frequency of every 4 hours PRN wheezing or increased work of breathing using Per Protocol order mode. 11-13 - enter or revise RT Bronchodilator order(s) to one equivalent RT bronchodilator order with QID Frequency and an Albuterol order with Frequency of every 4 hours PRN wheezing or increased work of breathing using Per Protocol order mode. Greater than 13 - enter or revise RT Bronchodilator order(s) to one equivalent RT bronchodilator order with every 4 hours Frequency and an Albuterol order with Frequency of every 2 hours PRN wheezing or increased work of breathing using Per Protocol order mode. RT to enter RT Home Evaluation for COPD & MDI Assessment order using Per Protocol order mode.     Electronically signed by Judy Javier RCP on 6/8/2023 at 7:52 AM
equivalent RT Bronchodilator order with Frequency of every 4 hours PRN for wheezing or increased work of breathing using Per Protocol order mode. 4-6 - enter or revise RT Bronchodilator order(s) to two equivalent RT bronchodilator orders with one order with BID Frequency and one order with Frequency of every 4 hours PRN wheezing or increased work of breathing using Per Protocol order mode. 7-10 - enter or revise RT Bronchodilator order(s) to two equivalent RT bronchodilator orders with one order with TID Frequency and one order with Frequency of every 4 hours PRN wheezing or increased work of breathing using Per Protocol order mode. 11-13 - enter or revise RT Bronchodilator order(s) to one equivalent RT bronchodilator order with QID Frequency and an Albuterol order with Frequency of every 4 hours PRN wheezing or increased work of breathing using Per Protocol order mode. Greater than 13 - enter or revise RT Bronchodilator order(s) to one equivalent RT bronchodilator order with every 4 hours Frequency and an Albuterol order with Frequency of every 2 hours PRN wheezing or increased work of breathing using Per Protocol order mode. RT to enter RT Home Evaluation for COPD & MDI Assessment order using Per Protocol order mode.     Electronically signed by Isabell Jara RCP on 6/9/2023 at 7:50 AM
or increased work of breathing using Per Protocol order mode. 4-6 - enter or revise RT Bronchodilator order(s) to two equivalent RT bronchodilator orders with one order with BID Frequency and one order with Frequency of every 4 hours PRN wheezing or increased work of breathing using Per Protocol order mode. 7-10 - enter or revise RT Bronchodilator order(s) to two equivalent RT bronchodilator orders with one order with TID Frequency and one order with Frequency of every 4 hours PRN wheezing or increased work of breathing using Per Protocol order mode. 11-13 - enter or revise RT Bronchodilator order(s) to one equivalent RT bronchodilator order with QID Frequency and an Albuterol order with Frequency of every 4 hours PRN wheezing or increased work of breathing using Per Protocol order mode. Greater than 13 - enter or revise RT Bronchodilator order(s) to one equivalent RT bronchodilator order with every 4 hours Frequency and an Albuterol order with Frequency of every 2 hours PRN wheezing or increased work of breathing using Per Protocol order mode. RT to enter RT Home Evaluation for COPD & MDI Assessment order using Per Protocol order mode.     Electronically signed by Sarthak Traore Back on 6/10/2023 at 7:54 AM
order with Frequency of every 4 hours PRN for wheezing or increased work of breathing using Per Protocol order mode. 4-6 - enter or revise RT Bronchodilator order(s) to two equivalent RT bronchodilator orders with one order with BID Frequency and one order with Frequency of every 4 hours PRN wheezing or increased work of breathing using Per Protocol order mode. 7-10 - enter or revise RT Bronchodilator order(s) to two equivalent RT bronchodilator orders with one order with TID Frequency and one order with Frequency of every 4 hours PRN wheezing or increased work of breathing using Per Protocol order mode. 11-13 - enter or revise RT Bronchodilator order(s) to one equivalent RT bronchodilator order with QID Frequency and an Albuterol order with Frequency of every 4 hours PRN wheezing or increased work of breathing using Per Protocol order mode. Greater than 13 - enter or revise RT Bronchodilator order(s) to one equivalent RT bronchodilator order with every 4 hours Frequency and an Albuterol order with Frequency of every 2 hours PRN wheezing or increased work of breathing using Per Protocol order mode. PATIENT WILL BENEFIT FROM TREATMENTS.      Electronically signed by Liz Fulton RCP on 6/8/2023 at 8:24 PM
with Frequency of every 4 hours PRN for wheezing or increased work of breathing using Per Protocol order mode. 4-6 - enter or revise RT Bronchodilator order(s) to two equivalent RT bronchodilator orders with one order with BID Frequency and one order with Frequency of every 4 hours PRN wheezing or increased work of breathing using Per Protocol order mode. 7-10 - enter or revise RT Bronchodilator order(s) to two equivalent RT bronchodilator orders with one order with TID Frequency and one order with Frequency of every 4 hours PRN wheezing or increased work of breathing using Per Protocol order mode. 11-13 - enter or revise RT Bronchodilator order(s) to one equivalent RT bronchodilator order with QID Frequency and an Albuterol order with Frequency of every 4 hours PRN wheezing or increased work of breathing using Per Protocol order mode. Greater than 13 - enter or revise RT Bronchodilator order(s) to one equivalent RT bronchodilator order with every 4 hours Frequency and an Albuterol order with Frequency of every 2 hours PRN wheezing or increased work of breathing using Per Protocol order mode. PATIENT WILL BENEFIT FROM TREATMENTS.      Electronically signed by Andrew Cochran RCP on 6/8/2023 at 2:04 AM
sitting: SBA  Scooting in supine:  Not Tested    Transfers:  Pt declining OOB activity this date. Sit to stand:    Not Tested  Stand to sit:    Not Tested  Bed to chair:     Not Tested  Bed/ chair to standard toilet:  Not Tested  Bed/chair to [facility]:   Not Tested  Functional Mobility:   Not Tested    See PT note for gait analysis. ADLs:  Dressing:      UE:   Not Tested  LE:    Not Tested     Bathing:    UE:  Not Tested  LE:  Not Tested    Eating:   Not Tested    Toileting:  Not Tested    Grooming/hygiene: Max A - to wash/ brush hair     Supervision to wash face and arms. Activity Tolerance:   Pt completed therapy session with SOB/LEE    Pt Position BP (mmHg) HR (bpm) SpO2 (%) on 6 L NC pt also using NRB @ 13L PRN during session. Comments   Supine at rest           Seated at EOB     %     Standing          End of session     %       Positioning Needs:   Pt in bed, alarm set, call light provided and all needs within reach . Ther Ex / Activities Initiated:   N/A    Patient/Family Education:   Pt educated on role of inpatient OT, plan of care, importance of continued activity, Calling for assist with mobility. Assessment:    Pt seen for Occupational therapy evaluation in acute care setting. Pt demonstrated decreased Activity tolerance, ADLs, IADLs, and Transfers. Pt functioning below baseline and will likely benefit from skilled occupational therapy services to maximize safety and independence. Recommending Home 24 hr assist upon discharge as patient functioning close to baseline level    Goal(s) :    To be met in 3 Visits:  Bed to toilet/BSC:       SBA  Pt will complete 3/3 CHF goals     N/A    To be met in 5 Visits:  Supine to/from Sit in preparation for ADL task:   Modified Independent  Toileting        SBA  Grooming       Independent  Upper Body Dressing:      Supervision  Lower Body Dressing:      SBA  Pt to demonstrate UE therapeutic exs x 15 reps with minimal cues    Rehabilitation
Body Dressing:      Supervision  Lower Body Dressing:      SBA  Pt to demonstrate UE therapeutic exs x 15 reps with minimal cues    Rehabilitation Potential: Fair  Strengths for achieving goals include: Pt cooperative   Barriers to achieving goals include:  Complexity of condition    Plan: To be seen 3-5 x/wk while in acute care setting for therapeutic exercises, bed mobility, transfers, family/patient education, ADL/IADL retraining, and energy conservation training.     Electronically signed by Dayton Banda OT on 6/10/2023 at 9:32 AM      If patient discharges from this facility prior to next visit, this note will serve as the Discharge Summary
Evaluation & Treatment. Pt demonstrated decreased Activity tolerance, Balance, Safety, and Strength as well as decreased independence with Ambulation, Bed Mobility , and Transfers. Pt is a 76 y/o female who presents below her functional baseline. Pt able to complete sitting EOB and static standing. No ambulation completed this date due to drop in SpO2 in static standing. Pt would continue to benefit from skilled PT services throughout inpatient stay to promote increased strength, balance and functional activity tolerance. Recommend continued skilled PT services upon discharge. Recommending Home 24 hr assist and with home PT upon discharge as patient functioning below baseline level    Goals : To be met in 3 visits:  1). Independent with LE Ex x 10 reps  2). Sit to/from stand: Independent  3). Bed to chair: Independent      To be met in 6 visits:  1). Supine to/from sit: Independent  4). Gait: Ambulate  10 ft.   with SBA and use of LRAD (least restrictive assistive device)  5). Tolerate B LE exercises 3 sets of 10-15 reps      Rehabilitation Potential: Fair  Strengths for achieving goals include:   Pt motivated, PLOF, Family Support, and Pt cooperative   Barriers to achieving goals include:    Complexity of condition, Chronicity of condition, and Weakness    Plan    To be seen 3-5 x / week  while in acute care setting for therapeutic exercises, bed mobility, transfers, progressive gait training, balance training, and family/patient education. Signature: Johny Trevino PT     If patient discharges from this facility prior to next visit, this note will serve as the Discharge Summary.
achieving goals include:  Complexity of condition    Plan: To be seen 3-5 x/wk while in acute care setting for therapeutic exercises, bed mobility, transfers, family/patient education, ADL/IADL retraining, and energy conservation training.     Electronically signed by Vibha Davila OT on 6/8/2023 at 9:44 AM      If patient discharges from this facility prior to next visit, this note will serve as the Discharge Summary

## 2023-06-10 NOTE — FLOWSHEET NOTE
06/10/23 1144   Vital Signs   Temp 97.8 °F (36.6 °C)   Temp Source Oral   Pulse 77   Heart Rate Source Monitor   Respirations 18   /78   MAP (Calculated) 94   BP Location Left upper arm   BP Method Automatic   Patient Position Turns self;Sitting   Level of Consciousness 0   MEWS Score 1   Pain Assessment   Pain Assessment None - Denies Pain   Pain Level 0   Patient's Stated Pain Goal 0 - No pain   Oxygen Therapy   SpO2 100 %   O2 Device High flow nasal cannula   Skin Assessment Clean, dry, & intact   O2 Flow Rate (L/min) 6 L/min     Pt is A&O x4 vitals are stable. Pending d/c based on respiratory needs @ home. Pt reqd anxiety meds gave prn 0.5 mg ativan.  Pt denies any further needs at this time call light within reach

## 2023-06-10 NOTE — PLAN OF CARE
Problem: Discharge Planning  Goal: Discharge to home or other facility with appropriate resources  6/10/2023 0112 by Vish Perez RN  Outcome: Progressing     Problem: Safety - Adult  Goal: Free from fall injury  6/10/2023 0112 by Vish Perez RN  Outcome: Progressing     Problem: ABCDS Injury Assessment  Goal: Absence of physical injury  6/10/2023 0112 by Vish Perez RN  Outcome: Progressing     Problem: Pain  Goal: Verbalizes/displays adequate comfort level or baseline comfort level  6/10/2023 0112 by Vish Perez RN  Outcome: Progressing     Problem: Chronic Conditions and Co-morbidities  Goal: Patient's chronic conditions and co-morbidity symptoms are monitored and maintained or improved  6/10/2023 0112 by Vish Perez RN  Outcome: Progressing     Problem: Cardiovascular - Adult  Goal: Maintains optimal cardiac output and hemodynamic stability  6/10/2023 0112 by Vish Perez RN  Outcome: Progressing    Goal: Absence of cardiac dysrhythmias or at baseline  6/10/2023 0112 by Vish Perez RN  Outcome: Progressing
Problem: Discharge Planning  Goal: Discharge to home or other facility with appropriate resources  6/10/2023 1042 by Alicja Guadalupe RN  Outcome: Progressing  6/10/2023 1035 by Alicja Guadalupe RN  Outcome: Progressing  6/10/2023 0112 by Nanci Mims RN  Outcome: Progressing     Problem: Safety - Adult  Goal: Free from fall injury  6/10/2023 1042 by Alicja Guadalupe RN  Outcome: Progressing  6/10/2023 1035 by Alicja Guadalupe RN  Outcome: Progressing  6/10/2023 0112 by Nanci Mims RN  Outcome: Progressing     Problem: ABCDS Injury Assessment  Goal: Absence of physical injury  6/10/2023 1042 by Alicja Guadalupe RN  Outcome: Progressing  Flowsheets (Taken 6/10/2023 1042)  Absence of Physical Injury: Implement safety measures based on patient assessment  6/10/2023 1035 by Alicja Guadalupe RN  Outcome: Progressing  Flowsheets (Taken 6/10/2023 1035)  Absence of Physical Injury: Implement safety measures based on patient assessment  6/10/2023 0112 by Nanci Mims RN  Outcome: Progressing     Problem: Pain  Goal: Verbalizes/displays adequate comfort level or baseline comfort level  6/10/2023 1042 by Alicja Guadalupe RN  Outcome: Progressing  Flowsheets (Taken 6/10/2023 1042)  Verbalizes/displays adequate comfort level or baseline comfort level:   Encourage patient to monitor pain and request assistance   Assess pain using appropriate pain scale  6/10/2023 1035 by Alicja Guadalupe RN  Outcome: Progressing  Flowsheets (Taken 6/10/2023 1035)  Verbalizes/displays adequate comfort level or baseline comfort level: Encourage patient to monitor pain and request assistance  6/10/2023 0112 by Nanci Mims RN  Outcome: Progressing     Problem: Chronic Conditions and Co-morbidities  Goal: Patient's chronic conditions and co-morbidity symptoms are monitored and maintained or improved  6/10/2023 1042 by Alicja Guadalupe RN  Outcome: Progressing  Flowsheets (Taken 6/10/2023 1042)  Care Plan - Patient's
Problem: Discharge Planning  Goal: Discharge to home or other facility with appropriate resources  Outcome: Progressing     Problem: Safety - Adult  Goal: Free from fall injury  6/9/2023 1248 by Yen Xavier RN  Outcome: Progressing  6/9/2023 0041 by Lorenzo Gimenez RN  Outcome: Progressing     Problem: ABCDS Injury Assessment  Goal: Absence of physical injury  6/9/2023 1248 by Yen Xavier RN  Outcome: Progressing  6/9/2023 0041 by Lorenzo Gimenez RN  Outcome: Progressing     Problem: Pain  Goal: Verbalizes/displays adequate comfort level or baseline comfort level  6/9/2023 1248 by Yen Xavier RN  Outcome: Progressing  6/9/2023 0041 by Lorenzo Gimenez RN  Outcome: Progressing     Problem: Chronic Conditions and Co-morbidities  Goal: Patient's chronic conditions and co-morbidity symptoms are monitored and maintained or improved  Outcome: Progressing     Problem: Respiratory - Adult  Goal: Achieves optimal ventilation and oxygenation  Outcome: Progressing
Problem: Discharge Planning  Goal: Discharge to home or other facility with appropriate resources  Outcome: Progressing     Problem: Safety - Adult  Goal: Free from fall injury  Outcome: Progressing     Problem: ABCDS Injury Assessment  Goal: Absence of physical injury  Outcome: Progressing
Problem: Safety - Adult  Goal: Free from fall injury  6/8/2023 2002 by Jennifer Adams RN  Outcome: Progressing     Problem: ABCDS Injury Assessment  Goal: Absence of physical injury  6/8/2023 2002 by Jennifer Adams RN  Outcome: Progressing     Problem: Pain  Goal: Verbalizes/displays adequate comfort level or baseline comfort level  Outcome: Progressing  Will continue to monitor and care per plan protocol.
SOB, hypoxemia with EMS in 40's. Hx of IPF secondary to , followed by [facility] lung transplant, with frequent exacerbations. Acute on chronic hypoxemic respiratory failure (requiring 10 L HFNC 100% in ED, baseline 4-6L NC). Labs and CXR negative for infection. D-dimer negative. DuoNebs, Solumedrol, Lasix x1, mucolytic. Pulmonology consulted. Full code per discussion with ED provider, not interested in hospice. Admit to PCU.
(75 gm/meal); Low Fat/Low Chol/High Fiber/JOEL; Low Sodium (2 gm)    Goal of Care Reviewed: Yes   Patient and/or Family's stated Goal of Care this Admission: reduce shortness of breath, increase activity tolerance, better understand heart failure and disease management, be more comfortable, and reduce lower extremity edema prior to discharge.
(75 gm/meal); Low Fat/Low Chol/High Fiber/JOEL; Low Sodium (2 gm)    Goal of Care Reviewed: Yes   Patient and/or Family's stated Goal of Care this Admission: reduce shortness of breath, increase activity tolerance, better understand heart failure and disease management, be more comfortable, and reduce lower extremity edema prior to discharge.
Chronic Conditions and Co-Morbidity Symptoms are Monitored and Maintained or Improved: Monitor and assess patient's chronic conditions and comorbid symptoms for stability, deterioration, or improvement  6/10/2023 1035 by Teresita Gale RN  Outcome: Progressing  Flowsheets (Taken 6/10/2023 1035)  Care Plan - Patient's Chronic Conditions and Co-Morbidity Symptoms are Monitored and Maintained or Improved: Monitor and assess patient's chronic conditions and comorbid symptoms for stability, deterioration, or improvement  6/10/2023 0112 by Gume Falcon RN  Outcome: Progressing     Problem: Respiratory - Adult  Goal: Achieves optimal ventilation and oxygenation  6/10/2023 1042 by Teresita Gale RN  Outcome: Progressing  Flowsheets (Taken 6/10/2023 1042)  Achieves optimal ventilation and oxygenation:   Assess for changes in respiratory status   Assess for changes in mentation and behavior  6/10/2023 1035 by Teresita Gale RN  Outcome: Progressing     Problem: Cardiovascular - Adult  Goal: Maintains optimal cardiac output and hemodynamic stability  6/10/2023 1042 by Teresita Gale RN  Outcome: Progressing  Flowsheets (Taken 6/10/2023 1042)  Maintains optimal cardiac output and hemodynamic stability: Monitor blood pressure and heart rate  6/10/2023 1035 by Teresita Gale RN  Outcome: Progressing  Flowsheets (Taken 6/10/2023 1035)  Maintains optimal cardiac output and hemodynamic stability:   Monitor blood pressure and heart rate   Monitor urine output and notify Licensed Independent Practitioner for values outside of normal range  6/10/2023 0112 by Gume Falcon RN  Outcome: Progressing  Goal: Absence of cardiac dysrhythmias or at baseline  6/10/2023 1042 by Teresita Gale RN  Outcome: Progressing  Flowsheets (Taken 6/10/2023 1042)  Absence of cardiac dysrhythmias or at baseline: Monitor cardiac rate and rhythm  6/10/2023 1035 by Teresita Gale RN  Outcome: Progressing  Flowsheets (Taken

## 2023-06-10 NOTE — FLOWSHEET NOTE
06/10/23 1007   Vital Signs   Temp 97.8 °F (36.6 °C)   Temp Source Oral   Pulse 77   Heart Rate Source Monitor   Respirations 18   /66   MAP (Calculated) 83   BP Location Right upper arm   BP Method Automatic   Patient Position Semi fowlers   Level of Consciousness 0   MEWS Score 1   Pain Assessment   Pain Assessment None - Denies Pain   Pain Level 0   Patient's Stated Pain Goal 0 - No pain   Oxygen Therapy   SpO2 100 %   O2 Device High flow nasal cannula   Skin Assessment Clean, dry, & intact   O2 Flow Rate (L/min) 6 L/min     Pt is awake and A&O x4 vitals are stable. Denies any pain. Gave morning meds per order shift assessment completed. Pt denies any further needs at this time.  Call light within reach

## 2023-06-13 ENCOUNTER — TELEPHONE (OUTPATIENT)
Dept: PULMONOLOGY | Age: 68
End: 2023-06-13

## 2023-06-13 NOTE — TELEPHONE ENCOUNTER
Pt seen in [facility] called from floor asking to addend note for AVAPS machine. Needs to state Obstructive sleep apnea cpap and bipap ruled out.

## 2023-06-14 ENCOUNTER — TELEPHONE (OUTPATIENT)
Dept: CARDIOLOGY CLINIC | Age: 68
End: 2023-06-14

## 2023-06-21 ENCOUNTER — CARE COORDINATION (OUTPATIENT)
Dept: CASE MANAGEMENT | Age: 68
End: 2023-06-21

## 2023-06-21 NOTE — CARE COORDINATION
[address] Care Transitions Follow Up Call      Care Transition Nurse contacted the family by telephone to follow up after recent hospital admission. Patient: Fabian Obando  Patient : 1955   MRN: [id number]  Reason for Admission: 3 days -> Acute on chronic respiratory failure with hypoxia, ILD with AE, COPD AE, PAF, HTN, hypothroidism, CHF, microcytic anemia, HLD, depression, anxiety, hx of DM now with hyperglycemia, weakness -> home with new AVAPS from [facility], baseline O2, dtr is [employer] [facility]HA [facility]. MWF, being worked up for lung transplant at [facility]  Discharge Date: 6/10/23 RARS: Readmission Risk Score: 42.9      Needs to be reviewed by the provider   Additional needs identified to be addressed with provider: No         Method of communication with provider: none. Spoke with daughter. She was last with patient on Monday evening. States patient was eaing well, a little sleepy but doing well. Completed TCM vv with PCP and has pulm vv tomorrow. SW attempted outreach to daughter to help with transportation to pulm rehab once referred. Daughter has number to call SW back. Provided daughter with numbers to Dr Slime Kennedy and Dr Nohemy Norton ([phone]) so she can schedule missed appointments. Denies needs. Addressed changes since last contact:  none  Discussed follow-up appointments. If no appointment was previously scheduled, appointment scheduling offered: Yes. Is follow up appointment scheduled within 7 days of discharge? Yes. Follow Up  Future Appointments   Date Time Provider Calvin Mcmullen   2023 11:30 AM [facility]     [facility] follow up appointment(s): [facility] - TBD    Care Transition Nurse reviewed medical action plan with family and discussed any barriers to care and/or understanding of plan of care after discharge.  Discussed appropriate site of care based on symptoms and resources available to patient including: PCP  Specialist. The family agrees to

## 2023-06-22 ENCOUNTER — TELEMEDICINE (OUTPATIENT)
Dept: PULMONOLOGY | Age: 68
End: 2023-06-22
Payer: MEDICARE

## 2023-06-22 ENCOUNTER — CARE COORDINATION (OUTPATIENT)
Dept: CARE COORDINATION | Age: 68
End: 2023-06-22

## 2023-06-22 DIAGNOSIS — J84.9 ILD (INTERSTITIAL LUNG DISEASE) (HCC): Primary | ICD-10-CM

## 2023-06-22 PROCEDURE — 99204 OFFICE O/P NEW MOD 45 MIN: CPT | Performed by: INTERNAL MEDICINE

## 2023-06-22 RX ORDER — PREDNISONE 10 MG/1
TABLET ORAL
Qty: 30 TABLET | Refills: 0 | Status: ON HOLD | OUTPATIENT
Start: 2023-06-22 | End: 2023-07-02

## 2023-06-22 NOTE — PROGRESS NOTES
[facility]                                 Pulmonary Consult /Progress Note :                                                                    CC worsening SOB     Subjective    Has ILD   Multiple admission   On 8 L ,her sat 93  Use face mask when less than 90%  Want to go home   Had LEE,at baseline  Family at bed side  No fever or chills  No chest pain     PHYSICAL EXAM:  not currently breastfeeding.' on 8 L  Video vist  Sat 93 on 8 L    LABS:    Microbiology:  6/7 COVID and influenza not detected  6/7 respiratory    Imaging:  Chest x-ray 6/7 imaging was reviewed by me and showed   No acute abnormalities    CT chest 5/2/23   1. No pulmonary embolism. 2. Advanced pattern of emphysema and chronic interstitial change that is  stable. 3. Resolution of airspace changes seen on the prior recent CT.       ASSESSMENT:  Acute hypoxemic respiratory failure   ILD exacerbation-cryptogenic organizing pneumonia and possible NSIP/chronic HP on biopsy  Emphysema exacerbation   Chronic respiratory failure normally 4 L at rest and 8 L on exertion  Life long nonsmoker        PLAN:  Supplemental oxygen to maintain SaO2 >92%; wean as tolerated  Feels better since she has left hospital   noninvasive ventilation,arranged ,she has at home and to be set soon  Intensive inhaled bronchodilator therapy  Will keep at 10 mg  30 days until see transplant time ,risk of osteo discussed take vitamin D and Ca  Z-Liang X 5 days  Continue home pirfenidone  Acapella and Mucinex  Patient to follow-up with [facility] transplant center as an outpatient ,  Back to base line      Patient with chronic respiratory failure consequent to COPD and ILD with recurrent admissions. Condition is life threatening where interruption of respiratory support would quickly lead to serous harm or death. Patient. I recommend   Obstructive sleep apnea cpap and bipap ruled out.    AVAPS   IPAP

## 2023-06-22 NOTE — CARE COORDINATION
Spoke to daughter Linda Oden. She reported that patient has several family members to transport her to [facility] at this time. She was inquiring about assistance for if she has to begin therapy several times a week. BRYANT suggested calling [employer] to determine if transportation through her plan. Also informed Malindajed Akshat, if pt has full Medicaid she would be eligible for transportation through the [facility]. Provided her through text with other resources for [facility] and [employer]. Informed Linda Oden to contact  with any future needs. Final outreach.

## 2023-06-23 ENCOUNTER — APPOINTMENT (OUTPATIENT)
Dept: GENERAL RADIOLOGY | Age: 68
End: 2023-06-23
Attending: STUDENT IN AN ORGANIZED HEALTH CARE EDUCATION/TRAINING PROGRAM
Payer: MEDICARE

## 2023-06-23 ENCOUNTER — HOSPITAL ENCOUNTER (INPATIENT)
Age: 68
LOS: 7 days | Discharge: HOME | End: 2023-06-30
Attending: STUDENT IN AN ORGANIZED HEALTH CARE EDUCATION/TRAINING PROGRAM | Admitting: STUDENT IN AN ORGANIZED HEALTH CARE EDUCATION/TRAINING PROGRAM
Payer: MEDICARE

## 2023-06-23 ENCOUNTER — TELEPHONE (OUTPATIENT)
Dept: OTHER | Facility: CLINIC | Age: 68
End: 2023-06-23

## 2023-06-23 DIAGNOSIS — R77.8 ELEVATED TROPONIN: ICD-10-CM

## 2023-06-23 DIAGNOSIS — J96.21 ACUTE ON CHRONIC RESPIRATORY FAILURE WITH HYPOXIA (HCC): Primary | ICD-10-CM

## 2023-06-23 PROBLEM — J96.20 ACUTE ON CHRONIC RESPIRATORY FAILURE (HCC): Status: ACTIVE | Noted: 2023-06-23

## 2023-06-23 LAB
ALBUMIN SERPL-MCNC: 3.5 G/DL (ref 3.4–5)
ALBUMIN/GLOB SERPL: 1.6 {RATIO} (ref 1.1–2.2)
ALP SERPL-CCNC: 64 U/L (ref 40–129)
ALT SERPL-CCNC: 31 U/L (ref 10–40)
ANION GAP SERPL CALCULATED.3IONS-SCNC: 11 MMOL/L (ref 3–16)
AST SERPL-CCNC: 41 U/L (ref 15–37)
BASE EXCESS BLDV CALC-SCNC: 11.4 MMOL/L (ref -3–3)
BASOPHILS # BLD: 0.1 K/UL (ref 0–0.2)
BASOPHILS NFR BLD: 0.5 %
BILIRUB SERPL-MCNC: <0.2 MG/DL (ref 0–1)
BUN SERPL-MCNC: 13 MG/DL (ref 7–20)
CALCIUM SERPL-MCNC: 8.3 MG/DL (ref 8.3–10.6)
CHLORIDE SERPL-SCNC: 101 MMOL/L (ref 99–110)
CO2 BLDV-SCNC: 36 MMOL/L
CO2 SERPL-SCNC: 31 MMOL/L (ref 21–32)
COHGB MFR BLDV: 3.1 % (ref 0–1.5)
CREAT SERPL-MCNC: <0.5 MG/DL (ref 0.6–1.2)
D DIMER: <0.27 UG/ML FEU (ref 0–0.6)
DEPRECATED RDW RBC AUTO: 18.7 % (ref 12.4–15.4)
EOSINOPHIL # BLD: 0.2 K/UL (ref 0–0.6)
EOSINOPHIL NFR BLD: 1.8 %
FLUAV RNA UPPER RESP QL NAA+PROBE: NEGATIVE
FLUBV AG NPH QL: NEGATIVE
GFR SERPLBLD CREATININE-BSD FMLA CKD-EPI: >60 ML/MIN/{1.73_M2}
GLUCOSE SERPL-MCNC: 132 MG/DL (ref 70–99)
HCO3 BLDV-SCNC: 34.8 MMOL/L (ref 23–29)
HCT VFR BLD AUTO: 29.2 % (ref 36–48)
HGB BLD-MCNC: 8.8 G/DL (ref 12–16)
LACTATE BLDV-SCNC: 1.8 MMOL/L (ref 0.4–1.9)
LYMPHOCYTES # BLD: 1 K/UL (ref 1–5.1)
LYMPHOCYTES NFR BLD: 7.7 %
MCH RBC QN AUTO: 23.1 PG (ref 26–34)
MCHC RBC AUTO-ENTMCNC: 30.2 G/DL (ref 31–36)
MCV RBC AUTO: 76.6 FL (ref 80–100)
METHGB MFR BLDV: 0.3 %
MONOCYTES # BLD: 0.6 K/UL (ref 0–1.3)
MONOCYTES NFR BLD: 4.5 %
NEUTROPHILS # BLD: 10.8 K/UL (ref 1.7–7.7)
NEUTROPHILS NFR BLD: 85.5 %
NT-PROBNP SERPL-MCNC: 922 PG/ML (ref 0–124)
O2 THERAPY: ABNORMAL
PCO2 BLDV: 41.1 MMHG (ref 40–50)
PH BLDV: 7.55 [PH] (ref 7.35–7.45)
PLATELET # BLD AUTO: 160 K/UL (ref 135–450)
PMV BLD AUTO: 7.8 FL (ref 5–10.5)
PO2 BLDV: 107.6 MMHG (ref 25–40)
POTASSIUM SERPL-SCNC: 3.7 MMOL/L (ref 3.5–5.1)
PROT SERPL-MCNC: 5.7 G/DL (ref 6.4–8.2)
RBC # BLD AUTO: 3.82 M/UL (ref 4–5.2)
SAO2 % BLDV: 98 %
SARS-COV-2 RDRP RESP QL NAA+PROBE: NOT DETECTED
SODIUM SERPL-SCNC: 143 MMOL/L (ref 136–145)
TROPONIN, HIGH SENSITIVITY: 18 NG/L (ref 0–14)
TROPONIN, HIGH SENSITIVITY: 20 NG/L (ref 0–14)
WBC # BLD AUTO: 12.6 K/UL (ref 4–11)

## 2023-06-23 PROCEDURE — 93005 ELECTROCARDIOGRAM TRACING: CPT | Performed by: STUDENT IN AN ORGANIZED HEALTH CARE EDUCATION/TRAINING PROGRAM

## 2023-06-23 PROCEDURE — 99285 EMERGENCY DEPT VISIT HI MDM: CPT

## 2023-06-23 PROCEDURE — 5A09457 ASSISTANCE WITH RESPIRATORY VENTILATION, 24-96 CONSECUTIVE HOURS, CONTINUOUS POSITIVE AIRWAY PRESSURE: ICD-10-PCS | Performed by: INTERNAL MEDICINE

## 2023-06-23 PROCEDURE — 36415 COLL VENOUS BLD VENIPUNCTURE: CPT

## 2023-06-23 PROCEDURE — 87804 INFLUENZA ASSAY W/OPTIC: CPT

## 2023-06-23 PROCEDURE — 80053 COMPREHEN METABOLIC PANEL: CPT

## 2023-06-23 PROCEDURE — 83880 ASSAY OF NATRIURETIC PEPTIDE: CPT

## 2023-06-23 PROCEDURE — 2060000000 HC ICU INTERMEDIATE R&B

## 2023-06-23 PROCEDURE — 6370000000 HC RX 637 (ALT 250 FOR IP): Performed by: STUDENT IN AN ORGANIZED HEALTH CARE EDUCATION/TRAINING PROGRAM

## 2023-06-23 PROCEDURE — 6360000002 HC RX W HCPCS: Performed by: STUDENT IN AN ORGANIZED HEALTH CARE EDUCATION/TRAINING PROGRAM

## 2023-06-23 PROCEDURE — 85379 FIBRIN DEGRADATION QUANT: CPT

## 2023-06-23 PROCEDURE — 71045 X-RAY EXAM CHEST 1 VIEW: CPT

## 2023-06-23 PROCEDURE — 82803 BLOOD GASES ANY COMBINATION: CPT

## 2023-06-23 PROCEDURE — 84484 ASSAY OF TROPONIN QUANT: CPT

## 2023-06-23 PROCEDURE — 83605 ASSAY OF LACTIC ACID: CPT

## 2023-06-23 PROCEDURE — 2580000003 HC RX 258: Performed by: STUDENT IN AN ORGANIZED HEALTH CARE EDUCATION/TRAINING PROGRAM

## 2023-06-23 PROCEDURE — 85025 COMPLETE CBC W/AUTO DIFF WBC: CPT

## 2023-06-23 PROCEDURE — 87040 BLOOD CULTURE FOR BACTERIA: CPT

## 2023-06-23 PROCEDURE — 96374 THER/PROPH/DIAG INJ IV PUSH: CPT

## 2023-06-23 PROCEDURE — 87635 SARS-COV-2 COVID-19 AMP PRB: CPT

## 2023-06-23 RX ORDER — SODIUM CHLORIDE 0.9 % (FLUSH) 0.9 %
5-40 SYRINGE (ML) INJECTION PRN
Status: DISCONTINUED | OUTPATIENT
Start: 2023-06-23 | End: 2023-06-30 | Stop reason: HOSPADM

## 2023-06-23 RX ORDER — SODIUM CHLORIDE 9 MG/ML
INJECTION, SOLUTION INTRAVENOUS PRN
Status: DISCONTINUED | OUTPATIENT
Start: 2023-06-23 | End: 2023-06-30 | Stop reason: HOSPADM

## 2023-06-23 RX ORDER — NADOLOL 40 MG/1
20 TABLET ORAL DAILY
Status: DISCONTINUED | OUTPATIENT
Start: 2023-06-24 | End: 2023-06-30 | Stop reason: HOSPADM

## 2023-06-23 RX ORDER — SODIUM CHLORIDE 0.9 % (FLUSH) 0.9 %
5-40 SYRINGE (ML) INJECTION EVERY 12 HOURS SCHEDULED
Status: DISCONTINUED | OUTPATIENT
Start: 2023-06-23 | End: 2023-06-30 | Stop reason: HOSPADM

## 2023-06-23 RX ORDER — PIRFENIDONE 267 MG/1
1 TABLET, FILM COATED ORAL 3 TIMES DAILY
Status: DISCONTINUED | OUTPATIENT
Start: 2023-06-23 | End: 2023-06-30 | Stop reason: HOSPADM

## 2023-06-23 RX ORDER — LORAZEPAM 0.5 MG/1
0.5 TABLET ORAL NIGHTLY PRN
Status: DISCONTINUED | OUTPATIENT
Start: 2023-06-23 | End: 2023-06-30 | Stop reason: HOSPADM

## 2023-06-23 RX ORDER — ACETAMINOPHEN 325 MG/1
650 TABLET ORAL EVERY 6 HOURS PRN
Status: DISCONTINUED | OUTPATIENT
Start: 2023-06-23 | End: 2023-06-30 | Stop reason: HOSPADM

## 2023-06-23 RX ORDER — ONDANSETRON 4 MG/1
4 TABLET, ORALLY DISINTEGRATING ORAL EVERY 8 HOURS PRN
Status: DISCONTINUED | OUTPATIENT
Start: 2023-06-23 | End: 2023-06-30 | Stop reason: HOSPADM

## 2023-06-23 RX ORDER — IPRATROPIUM BROMIDE AND ALBUTEROL SULFATE 2.5; .5 MG/3ML; MG/3ML
1 SOLUTION RESPIRATORY (INHALATION) ONCE
Status: COMPLETED | OUTPATIENT
Start: 2023-06-23 | End: 2023-06-25

## 2023-06-23 RX ORDER — LEVOTHYROXINE SODIUM 0.1 MG/1
100 TABLET ORAL DAILY
Status: DISCONTINUED | OUTPATIENT
Start: 2023-06-24 | End: 2023-06-30 | Stop reason: HOSPADM

## 2023-06-23 RX ORDER — BISACODYL 5 MG/1
5 TABLET, DELAYED RELEASE ORAL DAILY PRN
Status: DISCONTINUED | OUTPATIENT
Start: 2023-06-23 | End: 2023-06-30 | Stop reason: HOSPADM

## 2023-06-23 RX ORDER — FERROUS SULFATE 325(65) MG
325 TABLET ORAL
Status: DISCONTINUED | OUTPATIENT
Start: 2023-06-24 | End: 2023-06-30 | Stop reason: HOSPADM

## 2023-06-23 RX ORDER — ACETAMINOPHEN 650 MG/1
650 SUPPOSITORY RECTAL EVERY 6 HOURS PRN
Status: DISCONTINUED | OUTPATIENT
Start: 2023-06-23 | End: 2023-06-30 | Stop reason: HOSPADM

## 2023-06-23 RX ORDER — ONDANSETRON 2 MG/ML
4 INJECTION INTRAMUSCULAR; INTRAVENOUS EVERY 6 HOURS PRN
Status: DISCONTINUED | OUTPATIENT
Start: 2023-06-23 | End: 2023-06-30 | Stop reason: HOSPADM

## 2023-06-23 RX ORDER — SERTRALINE HYDROCHLORIDE 100 MG/1
200 TABLET, FILM COATED ORAL DAILY
Status: DISCONTINUED | OUTPATIENT
Start: 2023-06-24 | End: 2023-06-30 | Stop reason: HOSPADM

## 2023-06-23 RX ORDER — MIRTAZAPINE 30 MG/1
30 TABLET, FILM COATED ORAL NIGHTLY PRN
Status: DISCONTINUED | OUTPATIENT
Start: 2023-06-23 | End: 2023-06-30 | Stop reason: HOSPADM

## 2023-06-23 RX ORDER — ATORVASTATIN CALCIUM 40 MG/1
40 TABLET, FILM COATED ORAL DAILY
Status: DISCONTINUED | OUTPATIENT
Start: 2023-06-24 | End: 2023-06-30 | Stop reason: HOSPADM

## 2023-06-23 RX ORDER — ENOXAPARIN SODIUM 100 MG/ML
40 INJECTION SUBCUTANEOUS DAILY
Status: DISCONTINUED | OUTPATIENT
Start: 2023-06-24 | End: 2023-06-30 | Stop reason: HOSPADM

## 2023-06-23 RX ORDER — IPRATROPIUM BROMIDE AND ALBUTEROL SULFATE 2.5; .5 MG/3ML; MG/3ML
2 SOLUTION RESPIRATORY (INHALATION) ONCE
Status: COMPLETED | OUTPATIENT
Start: 2023-06-23 | End: 2023-06-23

## 2023-06-23 RX ORDER — HYDROXYZINE HYDROCHLORIDE 10 MG/1
10 TABLET, FILM COATED ORAL 3 TIMES DAILY PRN
Status: DISCONTINUED | OUTPATIENT
Start: 2023-06-23 | End: 2023-06-30 | Stop reason: HOSPADM

## 2023-06-23 RX ORDER — PANTOPRAZOLE SODIUM 40 MG/1
40 TABLET, DELAYED RELEASE ORAL DAILY
Status: DISCONTINUED | OUTPATIENT
Start: 2023-06-24 | End: 2023-06-30 | Stop reason: HOSPADM

## 2023-06-23 RX ORDER — DILTIAZEM HYDROCHLORIDE 240 MG/1
240 CAPSULE, COATED, EXTENDED RELEASE ORAL DAILY
Status: DISCONTINUED | OUTPATIENT
Start: 2023-06-24 | End: 2023-06-30 | Stop reason: HOSPADM

## 2023-06-23 RX ORDER — TRAMADOL HYDROCHLORIDE 50 MG/1
50 TABLET ORAL EVERY 6 HOURS PRN
Status: DISCONTINUED | OUTPATIENT
Start: 2023-06-23 | End: 2023-06-30 | Stop reason: HOSPADM

## 2023-06-23 RX ORDER — POTASSIUM CHLORIDE 750 MG/1
20 TABLET, EXTENDED RELEASE ORAL
Status: DISCONTINUED | OUTPATIENT
Start: 2023-06-24 | End: 2023-06-30 | Stop reason: HOSPADM

## 2023-06-23 RX ADMIN — METHYLPREDNISOLONE SODIUM SUCCINATE 125 MG: 125 INJECTION INTRAMUSCULAR; INTRAVENOUS at 20:50

## 2023-06-23 RX ADMIN — IPRATROPIUM BROMIDE AND ALBUTEROL SULFATE 2 DOSE: .5; 2.5 SOLUTION RESPIRATORY (INHALATION) at 20:51

## 2023-06-23 ASSESSMENT — PAIN - FUNCTIONAL ASSESSMENT
PAIN_FUNCTIONAL_ASSESSMENT: NONE - DENIES PAIN

## 2023-06-24 PROBLEM — J96.90 RESPIRATORY FAILURE (HCC): Status: ACTIVE | Noted: 2023-06-24

## 2023-06-24 LAB
ANION GAP SERPL CALCULATED.3IONS-SCNC: 8 MMOL/L (ref 3–16)
BUN SERPL-MCNC: 14 MG/DL (ref 7–20)
CALCIUM SERPL-MCNC: 9 MG/DL (ref 8.3–10.6)
CHLORIDE SERPL-SCNC: 101 MMOL/L (ref 99–110)
CO2 SERPL-SCNC: 34 MMOL/L (ref 21–32)
CREAT SERPL-MCNC: <0.5 MG/DL (ref 0.6–1.2)
DEPRECATED RDW RBC AUTO: 18.7 % (ref 12.4–15.4)
EKG ATRIAL RATE: 94 BPM
EKG DIAGNOSIS: NORMAL
EKG P AXIS: 14 DEGREES
EKG P-R INTERVAL: 136 MS
EKG Q-T INTERVAL: 354 MS
EKG QRS DURATION: 78 MS
EKG QTC CALCULATION (BAZETT): 442 MS
EKG R AXIS: 30 DEGREES
EKG T AXIS: 5 DEGREES
EKG VENTRICULAR RATE: 94 BPM
GFR SERPLBLD CREATININE-BSD FMLA CKD-EPI: >60 ML/MIN/{1.73_M2}
GLUCOSE BLD-MCNC: 159 MG/DL (ref 70–99)
GLUCOSE BLD-MCNC: 160 MG/DL (ref 70–99)
GLUCOSE BLD-MCNC: 164 MG/DL (ref 70–99)
GLUCOSE BLD-MCNC: 173 MG/DL (ref 70–99)
GLUCOSE BLD-MCNC: 191 MG/DL (ref 70–99)
GLUCOSE SERPL-MCNC: 152 MG/DL (ref 70–99)
HCT VFR BLD AUTO: 28.7 % (ref 36–48)
HGB BLD-MCNC: 9.1 G/DL (ref 12–16)
MCH RBC QN AUTO: 24.8 PG (ref 26–34)
MCHC RBC AUTO-ENTMCNC: 31.6 G/DL (ref 31–36)
MCV RBC AUTO: 78.6 FL (ref 80–100)
PERFORMED ON: ABNORMAL
PLATELET # BLD AUTO: 216 K/UL (ref 135–450)
PMV BLD AUTO: 7.8 FL (ref 5–10.5)
POTASSIUM SERPL-SCNC: 4.2 MMOL/L (ref 3.5–5.1)
RBC # BLD AUTO: 3.66 M/UL (ref 4–5.2)
SODIUM SERPL-SCNC: 143 MMOL/L (ref 136–145)
WBC # BLD AUTO: 9.8 K/UL (ref 4–11)

## 2023-06-24 PROCEDURE — 2580000003 HC RX 258: Performed by: STUDENT IN AN ORGANIZED HEALTH CARE EDUCATION/TRAINING PROGRAM

## 2023-06-24 PROCEDURE — 94761 N-INVAS EAR/PLS OXIMETRY MLT: CPT

## 2023-06-24 PROCEDURE — 94660 CPAP INITIATION&MGMT: CPT

## 2023-06-24 PROCEDURE — 6370000000 HC RX 637 (ALT 250 FOR IP): Performed by: STUDENT IN AN ORGANIZED HEALTH CARE EDUCATION/TRAINING PROGRAM

## 2023-06-24 PROCEDURE — 99223 1ST HOSP IP/OBS HIGH 75: CPT | Performed by: INTERNAL MEDICINE

## 2023-06-24 PROCEDURE — 2060000000 HC ICU INTERMEDIATE R&B

## 2023-06-24 PROCEDURE — 6370000000 HC RX 637 (ALT 250 FOR IP): Performed by: INTERNAL MEDICINE

## 2023-06-24 PROCEDURE — 93010 ELECTROCARDIOGRAM REPORT: CPT | Performed by: INTERNAL MEDICINE

## 2023-06-24 PROCEDURE — 36415 COLL VENOUS BLD VENIPUNCTURE: CPT

## 2023-06-24 PROCEDURE — 85027 COMPLETE CBC AUTOMATED: CPT

## 2023-06-24 PROCEDURE — 80048 BASIC METABOLIC PNL TOTAL CA: CPT

## 2023-06-24 PROCEDURE — 6360000002 HC RX W HCPCS: Performed by: STUDENT IN AN ORGANIZED HEALTH CARE EDUCATION/TRAINING PROGRAM

## 2023-06-24 PROCEDURE — 2700000000 HC OXYGEN THERAPY PER DAY

## 2023-06-24 RX ORDER — DEXTROSE MONOHYDRATE 100 MG/ML
INJECTION, SOLUTION INTRAVENOUS CONTINUOUS PRN
Status: DISCONTINUED | OUTPATIENT
Start: 2023-06-24 | End: 2023-06-30 | Stop reason: HOSPADM

## 2023-06-24 RX ORDER — INSULIN LISPRO 100 [IU]/ML
0-4 INJECTION, SOLUTION INTRAVENOUS; SUBCUTANEOUS NIGHTLY
Status: DISCONTINUED | OUTPATIENT
Start: 2023-06-24 | End: 2023-06-30 | Stop reason: HOSPADM

## 2023-06-24 RX ORDER — BENZONATATE 100 MG/1
200 CAPSULE ORAL 3 TIMES DAILY PRN
Status: DISCONTINUED | OUTPATIENT
Start: 2023-06-24 | End: 2023-06-30 | Stop reason: HOSPADM

## 2023-06-24 RX ORDER — INSULIN LISPRO 100 [IU]/ML
0-4 INJECTION, SOLUTION INTRAVENOUS; SUBCUTANEOUS
Status: DISCONTINUED | OUTPATIENT
Start: 2023-06-24 | End: 2023-06-30 | Stop reason: HOSPADM

## 2023-06-24 RX ADMIN — LEVOTHYROXINE SODIUM 100 MCG: 100 TABLET ORAL at 05:14

## 2023-06-24 RX ADMIN — ATORVASTATIN CALCIUM 40 MG: 40 TABLET, FILM COATED ORAL at 09:29

## 2023-06-24 RX ADMIN — PIRFENIDONE 1 TABLET: 267 TABLET, FILM COATED ORAL at 10:46

## 2023-06-24 RX ADMIN — SODIUM CHLORIDE, PRESERVATIVE FREE 10 ML: 5 INJECTION INTRAVENOUS at 02:35

## 2023-06-24 RX ADMIN — ENOXAPARIN SODIUM 40 MG: 100 INJECTION SUBCUTANEOUS at 09:31

## 2023-06-24 RX ADMIN — FERROUS SULFATE TAB 325 MG (65 MG ELEMENTAL FE) 325 MG: 325 (65 FE) TAB at 09:29

## 2023-06-24 RX ADMIN — LORAZEPAM 0.5 MG: 0.5 TABLET ORAL at 18:25

## 2023-06-24 RX ADMIN — METHYLPREDNISOLONE SODIUM SUCCINATE 40 MG: 40 INJECTION, POWDER, LYOPHILIZED, FOR SOLUTION INTRAMUSCULAR; INTRAVENOUS at 09:29

## 2023-06-24 RX ADMIN — SODIUM CHLORIDE, PRESERVATIVE FREE 10 ML: 5 INJECTION INTRAVENOUS at 10:10

## 2023-06-24 RX ADMIN — HYDROXYZINE HYDROCHLORIDE 10 MG: 10 TABLET ORAL at 14:07

## 2023-06-24 RX ADMIN — DILTIAZEM HYDROCHLORIDE 240 MG: 240 CAPSULE, COATED, EXTENDED RELEASE ORAL at 09:29

## 2023-06-24 RX ADMIN — PIRFENIDONE 1 TABLET: 267 TABLET, FILM COATED ORAL at 14:57

## 2023-06-24 RX ADMIN — NADOLOL 20 MG: 40 TABLET ORAL at 09:29

## 2023-06-24 RX ADMIN — SERTRALINE 200 MG: 100 TABLET, FILM COATED ORAL at 09:29

## 2023-06-24 RX ADMIN — BENZONATATE 200 MG: 100 CAPSULE ORAL at 14:57

## 2023-06-24 RX ADMIN — PANTOPRAZOLE SODIUM 40 MG: 40 TABLET, DELAYED RELEASE ORAL at 05:15

## 2023-06-24 RX ADMIN — PIRFENIDONE 1 TABLET: 267 TABLET, FILM COATED ORAL at 20:23

## 2023-06-24 ASSESSMENT — PAIN SCALES - GENERAL: PAINLEVEL_OUTOF10: 0

## 2023-06-24 ASSESSMENT — PAIN - FUNCTIONAL ASSESSMENT
PAIN_FUNCTIONAL_ASSESSMENT: NONE - DENIES PAIN
PAIN_FUNCTIONAL_ASSESSMENT: NONE - DENIES PAIN

## 2023-06-24 ASSESSMENT — LIFESTYLE VARIABLES
HOW OFTEN DO YOU HAVE A DRINK CONTAINING ALCOHOL: NEVER
HOW MANY STANDARD DRINKS CONTAINING ALCOHOL DO YOU HAVE ON A TYPICAL DAY: PATIENT DOES NOT DRINK
HOW MANY STANDARD DRINKS CONTAINING ALCOHOL DO YOU HAVE ON A TYPICAL DAY: PATIENT DOES NOT DRINK
HOW OFTEN DO YOU HAVE A DRINK CONTAINING ALCOHOL: NEVER

## 2023-06-24 NOTE — TELEPHONE ENCOUNTER
Writer contacted Dr Ale Nielsen to inform of 30 day readmission risk. Dr Ale Nielsen informed writer of readmission.     Call Back: If you need to call back to inform of disposition you can contact me at [phone]

## 2023-06-25 ENCOUNTER — APPOINTMENT (OUTPATIENT)
Dept: CT IMAGING | Age: 68
End: 2023-06-25
Payer: MEDICARE

## 2023-06-25 LAB
ANION GAP SERPL CALCULATED.3IONS-SCNC: 6 MMOL/L (ref 3–16)
BUN SERPL-MCNC: 21 MG/DL (ref 7–20)
CALCIUM SERPL-MCNC: 9 MG/DL (ref 8.3–10.6)
CHLORIDE SERPL-SCNC: 100 MMOL/L (ref 99–110)
CO2 SERPL-SCNC: 34 MMOL/L (ref 21–32)
CREAT SERPL-MCNC: <0.5 MG/DL (ref 0.6–1.2)
DEPRECATED RDW RBC AUTO: 19.2 % (ref 12.4–15.4)
GFR SERPLBLD CREATININE-BSD FMLA CKD-EPI: >60 ML/MIN/{1.73_M2}
GLUCOSE BLD-MCNC: 101 MG/DL (ref 70–99)
GLUCOSE BLD-MCNC: 139 MG/DL (ref 70–99)
GLUCOSE BLD-MCNC: 179 MG/DL (ref 70–99)
GLUCOSE BLD-MCNC: 183 MG/DL (ref 70–99)
GLUCOSE SERPL-MCNC: 171 MG/DL (ref 70–99)
HCT VFR BLD AUTO: 27.9 % (ref 36–48)
HGB BLD-MCNC: 8.6 G/DL (ref 12–16)
MAGNESIUM SERPL-MCNC: 2 MG/DL (ref 1.8–2.4)
MCH RBC QN AUTO: 23.6 PG (ref 26–34)
MCHC RBC AUTO-ENTMCNC: 30.7 G/DL (ref 31–36)
MCV RBC AUTO: 76.6 FL (ref 80–100)
PERFORMED ON: ABNORMAL
PLATELET # BLD AUTO: 159 K/UL (ref 135–450)
PMV BLD AUTO: 7.8 FL (ref 5–10.5)
POTASSIUM SERPL-SCNC: 3 MMOL/L (ref 3.5–5.1)
RBC # BLD AUTO: 3.64 M/UL (ref 4–5.2)
SODIUM SERPL-SCNC: 140 MMOL/L (ref 136–145)
WBC # BLD AUTO: 13.6 K/UL (ref 4–11)

## 2023-06-25 PROCEDURE — 2700000000 HC OXYGEN THERAPY PER DAY

## 2023-06-25 PROCEDURE — 80048 BASIC METABOLIC PNL TOTAL CA: CPT

## 2023-06-25 PROCEDURE — 6360000004 HC RX CONTRAST MEDICATION: Performed by: INTERNAL MEDICINE

## 2023-06-25 PROCEDURE — 71260 CT THORAX DX C+: CPT

## 2023-06-25 PROCEDURE — 85027 COMPLETE CBC AUTOMATED: CPT

## 2023-06-25 PROCEDURE — 94640 AIRWAY INHALATION TREATMENT: CPT

## 2023-06-25 PROCEDURE — 6370000000 HC RX 637 (ALT 250 FOR IP): Performed by: INTERNAL MEDICINE

## 2023-06-25 PROCEDURE — 6360000002 HC RX W HCPCS: Performed by: STUDENT IN AN ORGANIZED HEALTH CARE EDUCATION/TRAINING PROGRAM

## 2023-06-25 PROCEDURE — 94660 CPAP INITIATION&MGMT: CPT

## 2023-06-25 PROCEDURE — 2060000000 HC ICU INTERMEDIATE R&B

## 2023-06-25 PROCEDURE — 36415 COLL VENOUS BLD VENIPUNCTURE: CPT

## 2023-06-25 PROCEDURE — 2580000003 HC RX 258: Performed by: STUDENT IN AN ORGANIZED HEALTH CARE EDUCATION/TRAINING PROGRAM

## 2023-06-25 PROCEDURE — 6370000000 HC RX 637 (ALT 250 FOR IP): Performed by: STUDENT IN AN ORGANIZED HEALTH CARE EDUCATION/TRAINING PROGRAM

## 2023-06-25 PROCEDURE — 83735 ASSAY OF MAGNESIUM: CPT

## 2023-06-25 PROCEDURE — 94761 N-INVAS EAR/PLS OXIMETRY MLT: CPT

## 2023-06-25 RX ORDER — IPRATROPIUM BROMIDE AND ALBUTEROL SULFATE 2.5; .5 MG/3ML; MG/3ML
1 SOLUTION RESPIRATORY (INHALATION) EVERY 4 HOURS PRN
Status: DISCONTINUED | OUTPATIENT
Start: 2023-06-25 | End: 2023-06-30 | Stop reason: HOSPADM

## 2023-06-25 RX ORDER — IPRATROPIUM BROMIDE AND ALBUTEROL SULFATE 2.5; .5 MG/3ML; MG/3ML
1 SOLUTION RESPIRATORY (INHALATION) 3 TIMES DAILY
Status: DISCONTINUED | OUTPATIENT
Start: 2023-06-25 | End: 2023-06-30 | Stop reason: HOSPADM

## 2023-06-25 RX ORDER — POTASSIUM CHLORIDE 20 MEQ/1
40 TABLET, EXTENDED RELEASE ORAL PRN
Status: DISCONTINUED | OUTPATIENT
Start: 2023-06-25 | End: 2023-06-30 | Stop reason: HOSPADM

## 2023-06-25 RX ORDER — POTASSIUM CHLORIDE 7.45 MG/ML
10 INJECTION INTRAVENOUS PRN
Status: DISCONTINUED | OUTPATIENT
Start: 2023-06-25 | End: 2023-06-30 | Stop reason: HOSPADM

## 2023-06-25 RX ORDER — MAGNESIUM SULFATE IN WATER 40 MG/ML
2000 INJECTION, SOLUTION INTRAVENOUS PRN
Status: DISCONTINUED | OUTPATIENT
Start: 2023-06-25 | End: 2023-06-30 | Stop reason: HOSPADM

## 2023-06-25 RX ADMIN — ACETAMINOPHEN 650 MG: 325 TABLET ORAL at 19:57

## 2023-06-25 RX ADMIN — ENOXAPARIN SODIUM 40 MG: 100 INJECTION SUBCUTANEOUS at 09:26

## 2023-06-25 RX ADMIN — SERTRALINE 200 MG: 100 TABLET, FILM COATED ORAL at 09:21

## 2023-06-25 RX ADMIN — POTASSIUM CHLORIDE 20 MEQ: 750 TABLET, EXTENDED RELEASE ORAL at 09:23

## 2023-06-25 RX ADMIN — BENZONATATE 200 MG: 100 CAPSULE ORAL at 14:14

## 2023-06-25 RX ADMIN — POTASSIUM CHLORIDE 10 MEQ: 7.46 INJECTION, SOLUTION INTRAVENOUS at 16:31

## 2023-06-25 RX ADMIN — ATORVASTATIN CALCIUM 40 MG: 40 TABLET, FILM COATED ORAL at 09:24

## 2023-06-25 RX ADMIN — IOPAMIDOL 75 ML: 755 INJECTION, SOLUTION INTRAVENOUS at 22:48

## 2023-06-25 RX ADMIN — PIRFENIDONE 1 TABLET: 267 TABLET, FILM COATED ORAL at 19:58

## 2023-06-25 RX ADMIN — POTASSIUM CHLORIDE 10 MEQ: 7.46 INJECTION, SOLUTION INTRAVENOUS at 14:11

## 2023-06-25 RX ADMIN — LORAZEPAM 0.5 MG: 0.5 TABLET ORAL at 04:34

## 2023-06-25 RX ADMIN — PIRFENIDONE 1 TABLET: 267 TABLET, FILM COATED ORAL at 15:32

## 2023-06-25 RX ADMIN — ONDANSETRON 4 MG: 4 TABLET, ORALLY DISINTEGRATING ORAL at 14:07

## 2023-06-25 RX ADMIN — LORAZEPAM 0.5 MG: 0.5 TABLET ORAL at 19:57

## 2023-06-25 RX ADMIN — HYDROXYZINE HYDROCHLORIDE 10 MG: 10 TABLET ORAL at 14:07

## 2023-06-25 RX ADMIN — POTASSIUM CHLORIDE 10 MEQ: 7.46 INJECTION, SOLUTION INTRAVENOUS at 11:45

## 2023-06-25 RX ADMIN — POTASSIUM CHLORIDE 10 MEQ: 7.46 INJECTION, SOLUTION INTRAVENOUS at 15:31

## 2023-06-25 RX ADMIN — SODIUM CHLORIDE: 9 INJECTION, SOLUTION INTRAVENOUS at 10:38

## 2023-06-25 RX ADMIN — FERROUS SULFATE TAB 325 MG (65 MG ELEMENTAL FE) 325 MG: 325 (65 FE) TAB at 10:33

## 2023-06-25 RX ADMIN — POTASSIUM CHLORIDE 10 MEQ: 7.46 INJECTION, SOLUTION INTRAVENOUS at 10:39

## 2023-06-25 RX ADMIN — ACETAMINOPHEN 650 MG: 325 TABLET ORAL at 04:33

## 2023-06-25 RX ADMIN — IPRATROPIUM BROMIDE AND ALBUTEROL SULFATE 1 DOSE: .5; 2.5 SOLUTION RESPIRATORY (INHALATION) at 15:58

## 2023-06-25 RX ADMIN — LEVOTHYROXINE SODIUM 100 MCG: 100 TABLET ORAL at 04:52

## 2023-06-25 RX ADMIN — DILTIAZEM HYDROCHLORIDE 240 MG: 240 CAPSULE, COATED, EXTENDED RELEASE ORAL at 09:24

## 2023-06-25 RX ADMIN — PIRFENIDONE 1 TABLET: 267 TABLET, FILM COATED ORAL at 10:33

## 2023-06-25 RX ADMIN — IPRATROPIUM BROMIDE AND ALBUTEROL SULFATE 1 DOSE: .5; 2.5 SOLUTION RESPIRATORY (INHALATION) at 19:22

## 2023-06-25 RX ADMIN — PANTOPRAZOLE SODIUM 40 MG: 40 TABLET, DELAYED RELEASE ORAL at 04:53

## 2023-06-25 RX ADMIN — NADOLOL 20 MG: 40 TABLET ORAL at 09:23

## 2023-06-25 RX ADMIN — POTASSIUM CHLORIDE 10 MEQ: 7.46 INJECTION, SOLUTION INTRAVENOUS at 12:55

## 2023-06-25 ASSESSMENT — PAIN DESCRIPTION - LOCATION: LOCATION: HEAD

## 2023-06-25 ASSESSMENT — PAIN SCALES - GENERAL
PAINLEVEL_OUTOF10: 3
PAINLEVEL_OUTOF10: 0
PAINLEVEL_OUTOF10: 5

## 2023-06-26 PROBLEM — R77.8 ELEVATED TROPONIN: Status: ACTIVE | Noted: 2023-06-26

## 2023-06-26 PROBLEM — R79.89 ELEVATED TROPONIN: Status: ACTIVE | Noted: 2023-06-26

## 2023-06-26 LAB
ANION GAP SERPL CALCULATED.3IONS-SCNC: 5 MMOL/L (ref 3–16)
BUN SERPL-MCNC: 17 MG/DL (ref 7–20)
CALCIUM SERPL-MCNC: 8.5 MG/DL (ref 8.3–10.6)
CHLORIDE SERPL-SCNC: 102 MMOL/L (ref 99–110)
CO2 SERPL-SCNC: 34 MMOL/L (ref 21–32)
CREAT SERPL-MCNC: <0.5 MG/DL (ref 0.6–1.2)
CRP SERPL-MCNC: <3 MG/L (ref 0–5.1)
DEPRECATED RDW RBC AUTO: 19.2 % (ref 12.4–15.4)
FERRITIN SERPL IA-MCNC: 50.4 NG/ML (ref 15–150)
GFR SERPLBLD CREATININE-BSD FMLA CKD-EPI: >60 ML/MIN/{1.73_M2}
GLUCOSE BLD-MCNC: 125 MG/DL (ref 70–99)
GLUCOSE BLD-MCNC: 169 MG/DL (ref 70–99)
GLUCOSE BLD-MCNC: 219 MG/DL (ref 70–99)
GLUCOSE BLD-MCNC: 233 MG/DL (ref 70–99)
GLUCOSE SERPL-MCNC: 77 MG/DL (ref 70–99)
HCT VFR BLD AUTO: 30.3 % (ref 36–48)
HEMOCCULT STL QL: ABNORMAL
HGB BLD-MCNC: 9.1 G/DL (ref 12–16)
IRON SATN MFR SERPL: 10 % (ref 15–50)
IRON SERPL-MCNC: 36 UG/DL (ref 37–145)
MCH RBC QN AUTO: 23.8 PG (ref 26–34)
MCHC RBC AUTO-ENTMCNC: 30 G/DL (ref 31–36)
MCV RBC AUTO: 79.4 FL (ref 80–100)
PERFORMED ON: ABNORMAL
PLATELET # BLD AUTO: 136 K/UL (ref 135–450)
PMV BLD AUTO: 7.8 FL (ref 5–10.5)
POTASSIUM SERPL-SCNC: 4.5 MMOL/L (ref 3.5–5.1)
PROCALCITONIN SERPL IA-MCNC: 0.04 NG/ML (ref 0–0.15)
RBC # BLD AUTO: 3.82 M/UL (ref 4–5.2)
SODIUM SERPL-SCNC: 141 MMOL/L (ref 136–145)
TIBC SERPL-MCNC: 362 UG/DL (ref 260–445)
WBC # BLD AUTO: 8.6 K/UL (ref 4–11)

## 2023-06-26 PROCEDURE — 83540 ASSAY OF IRON: CPT

## 2023-06-26 PROCEDURE — 82270 OCCULT BLOOD FECES: CPT

## 2023-06-26 PROCEDURE — 94660 CPAP INITIATION&MGMT: CPT

## 2023-06-26 PROCEDURE — 94761 N-INVAS EAR/PLS OXIMETRY MLT: CPT

## 2023-06-26 PROCEDURE — 36415 COLL VENOUS BLD VENIPUNCTURE: CPT

## 2023-06-26 PROCEDURE — 6360000002 HC RX W HCPCS: Performed by: STUDENT IN AN ORGANIZED HEALTH CARE EDUCATION/TRAINING PROGRAM

## 2023-06-26 PROCEDURE — 84145 PROCALCITONIN (PCT): CPT

## 2023-06-26 PROCEDURE — 6370000000 HC RX 637 (ALT 250 FOR IP): Performed by: INTERNAL MEDICINE

## 2023-06-26 PROCEDURE — 94640 AIRWAY INHALATION TREATMENT: CPT

## 2023-06-26 PROCEDURE — 2580000003 HC RX 258: Performed by: STUDENT IN AN ORGANIZED HEALTH CARE EDUCATION/TRAINING PROGRAM

## 2023-06-26 PROCEDURE — 2060000000 HC ICU INTERMEDIATE R&B

## 2023-06-26 PROCEDURE — 99232 SBSQ HOSP IP/OBS MODERATE 35: CPT | Performed by: INTERNAL MEDICINE

## 2023-06-26 PROCEDURE — 86140 C-REACTIVE PROTEIN: CPT

## 2023-06-26 PROCEDURE — 82728 ASSAY OF FERRITIN: CPT

## 2023-06-26 PROCEDURE — 85027 COMPLETE CBC AUTOMATED: CPT

## 2023-06-26 PROCEDURE — 83550 IRON BINDING TEST: CPT

## 2023-06-26 PROCEDURE — 2700000000 HC OXYGEN THERAPY PER DAY

## 2023-06-26 PROCEDURE — 80048 BASIC METABOLIC PNL TOTAL CA: CPT

## 2023-06-26 PROCEDURE — 6370000000 HC RX 637 (ALT 250 FOR IP): Performed by: STUDENT IN AN ORGANIZED HEALTH CARE EDUCATION/TRAINING PROGRAM

## 2023-06-26 RX ORDER — HYDROCORTISONE 25 MG/G
CREAM TOPICAL 2 TIMES DAILY PRN
Status: DISCONTINUED | OUTPATIENT
Start: 2023-06-26 | End: 2023-06-30 | Stop reason: HOSPADM

## 2023-06-26 RX ADMIN — PIRFENIDONE 1 TABLET: 267 TABLET, FILM COATED ORAL at 12:18

## 2023-06-26 RX ADMIN — ATORVASTATIN CALCIUM 40 MG: 40 TABLET, FILM COATED ORAL at 12:17

## 2023-06-26 RX ADMIN — IPRATROPIUM BROMIDE AND ALBUTEROL SULFATE 1 DOSE: .5; 2.5 SOLUTION RESPIRATORY (INHALATION) at 13:43

## 2023-06-26 RX ADMIN — SERTRALINE 200 MG: 100 TABLET, FILM COATED ORAL at 12:18

## 2023-06-26 RX ADMIN — TRAMADOL HYDROCHLORIDE 50 MG: 50 TABLET ORAL at 20:11

## 2023-06-26 RX ADMIN — IPRATROPIUM BROMIDE AND ALBUTEROL SULFATE 1 DOSE: .5; 2.5 SOLUTION RESPIRATORY (INHALATION) at 19:32

## 2023-06-26 RX ADMIN — INSULIN LISPRO 1 UNITS: 100 INJECTION, SOLUTION INTRAVENOUS; SUBCUTANEOUS at 16:58

## 2023-06-26 RX ADMIN — PIRFENIDONE 1 TABLET: 267 TABLET, FILM COATED ORAL at 16:58

## 2023-06-26 RX ADMIN — NADOLOL 20 MG: 40 TABLET ORAL at 12:18

## 2023-06-26 RX ADMIN — BENZONATATE 200 MG: 100 CAPSULE ORAL at 20:11

## 2023-06-26 RX ADMIN — SODIUM CHLORIDE, PRESERVATIVE FREE 10 ML: 5 INJECTION INTRAVENOUS at 20:14

## 2023-06-26 RX ADMIN — PIRFENIDONE 1 TABLET: 267 TABLET, FILM COATED ORAL at 20:13

## 2023-06-26 RX ADMIN — MIRTAZAPINE 30 MG: 30 TABLET, FILM COATED ORAL at 20:11

## 2023-06-26 RX ADMIN — HYDROXYZINE HYDROCHLORIDE 10 MG: 10 TABLET ORAL at 20:10

## 2023-06-26 RX ADMIN — DILTIAZEM HYDROCHLORIDE 240 MG: 240 CAPSULE, COATED, EXTENDED RELEASE ORAL at 12:17

## 2023-06-26 RX ADMIN — METHYLPREDNISOLONE SODIUM SUCCINATE 40 MG: 40 INJECTION, POWDER, LYOPHILIZED, FOR SOLUTION INTRAMUSCULAR; INTRAVENOUS at 09:27

## 2023-06-26 RX ADMIN — ENOXAPARIN SODIUM 40 MG: 100 INJECTION SUBCUTANEOUS at 12:17

## 2023-06-26 RX ADMIN — POTASSIUM CHLORIDE 20 MEQ: 750 TABLET, EXTENDED RELEASE ORAL at 12:18

## 2023-06-26 RX ADMIN — IPRATROPIUM BROMIDE AND ALBUTEROL SULFATE 1 DOSE: .5; 2.5 SOLUTION RESPIRATORY (INHALATION) at 07:28

## 2023-06-26 RX ADMIN — SODIUM CHLORIDE, PRESERVATIVE FREE 10 ML: 5 INJECTION INTRAVENOUS at 13:26

## 2023-06-26 RX ADMIN — FERROUS SULFATE TAB 325 MG (65 MG ELEMENTAL FE) 325 MG: 325 (65 FE) TAB at 12:17

## 2023-06-26 ASSESSMENT — PAIN - FUNCTIONAL ASSESSMENT: PAIN_FUNCTIONAL_ASSESSMENT: ACTIVITIES ARE NOT PREVENTED

## 2023-06-26 ASSESSMENT — PAIN DESCRIPTION - ORIENTATION: ORIENTATION: LOWER

## 2023-06-26 ASSESSMENT — PAIN SCALES - WONG BAKER: WONGBAKER_NUMERICALRESPONSE: 2

## 2023-06-26 ASSESSMENT — PAIN DESCRIPTION - LOCATION: LOCATION: BACK

## 2023-06-26 ASSESSMENT — PAIN SCALES - GENERAL
PAINLEVEL_OUTOF10: 3
PAINLEVEL_OUTOF10: 4

## 2023-06-26 ASSESSMENT — PAIN DESCRIPTION - DESCRIPTORS: DESCRIPTORS: DULL

## 2023-06-27 LAB
BILIRUB UR QL STRIP.AUTO: NEGATIVE
CLARITY UR: CLEAR
COLOR UR: YELLOW
GLUCOSE BLD-MCNC: 153 MG/DL (ref 70–99)
GLUCOSE BLD-MCNC: 203 MG/DL (ref 70–99)
GLUCOSE BLD-MCNC: 219 MG/DL (ref 70–99)
GLUCOSE BLD-MCNC: 226 MG/DL (ref 70–99)
GLUCOSE UR STRIP.AUTO-MCNC: NEGATIVE MG/DL
HGB UR QL STRIP.AUTO: ABNORMAL
KETONES UR STRIP.AUTO-MCNC: NEGATIVE MG/DL
LEUKOCYTE ESTERASE UR QL STRIP.AUTO: NEGATIVE
NITRITE UR QL STRIP.AUTO: NEGATIVE
PERFORMED ON: ABNORMAL
PH UR STRIP.AUTO: 6.5 [PH] (ref 5–8)
PROT UR STRIP.AUTO-MCNC: NEGATIVE MG/DL
RBC #/AREA URNS HPF: NORMAL /HPF (ref 0–4)
SP GR UR STRIP.AUTO: 1.01 (ref 1–1.03)
UA DIPSTICK W REFLEX MICRO PNL UR: YES
URN SPEC COLLECT METH UR: ABNORMAL
UROBILINOGEN UR STRIP-ACNC: 0.2 E.U./DL
WBC #/AREA URNS HPF: NORMAL /HPF (ref 0–5)

## 2023-06-27 PROCEDURE — 6370000000 HC RX 637 (ALT 250 FOR IP): Performed by: INTERNAL MEDICINE

## 2023-06-27 PROCEDURE — 99232 SBSQ HOSP IP/OBS MODERATE 35: CPT | Performed by: INTERNAL MEDICINE

## 2023-06-27 PROCEDURE — 94640 AIRWAY INHALATION TREATMENT: CPT

## 2023-06-27 PROCEDURE — 94660 CPAP INITIATION&MGMT: CPT

## 2023-06-27 PROCEDURE — 2700000000 HC OXYGEN THERAPY PER DAY

## 2023-06-27 PROCEDURE — 2580000003 HC RX 258: Performed by: STUDENT IN AN ORGANIZED HEALTH CARE EDUCATION/TRAINING PROGRAM

## 2023-06-27 PROCEDURE — 2060000000 HC ICU INTERMEDIATE R&B

## 2023-06-27 PROCEDURE — 6360000002 HC RX W HCPCS: Performed by: STUDENT IN AN ORGANIZED HEALTH CARE EDUCATION/TRAINING PROGRAM

## 2023-06-27 PROCEDURE — 94761 N-INVAS EAR/PLS OXIMETRY MLT: CPT

## 2023-06-27 PROCEDURE — 81001 URINALYSIS AUTO W/SCOPE: CPT

## 2023-06-27 PROCEDURE — 6370000000 HC RX 637 (ALT 250 FOR IP): Performed by: STUDENT IN AN ORGANIZED HEALTH CARE EDUCATION/TRAINING PROGRAM

## 2023-06-27 RX ADMIN — SERTRALINE 200 MG: 100 TABLET, FILM COATED ORAL at 10:36

## 2023-06-27 RX ADMIN — PIRFENIDONE 1 TABLET: 267 TABLET, FILM COATED ORAL at 20:04

## 2023-06-27 RX ADMIN — NADOLOL 20 MG: 40 TABLET ORAL at 10:35

## 2023-06-27 RX ADMIN — LORAZEPAM 0.5 MG: 0.5 TABLET ORAL at 04:14

## 2023-06-27 RX ADMIN — HYDROXYZINE HYDROCHLORIDE 10 MG: 10 TABLET ORAL at 20:04

## 2023-06-27 RX ADMIN — LEVOTHYROXINE SODIUM 100 MCG: 100 TABLET ORAL at 05:59

## 2023-06-27 RX ADMIN — POTASSIUM CHLORIDE 20 MEQ: 750 TABLET, EXTENDED RELEASE ORAL at 10:45

## 2023-06-27 RX ADMIN — IPRATROPIUM BROMIDE AND ALBUTEROL SULFATE 1 DOSE: .5; 2.5 SOLUTION RESPIRATORY (INHALATION) at 07:34

## 2023-06-27 RX ADMIN — SODIUM CHLORIDE, PRESERVATIVE FREE 10 ML: 5 INJECTION INTRAVENOUS at 11:38

## 2023-06-27 RX ADMIN — DILTIAZEM HYDROCHLORIDE 240 MG: 240 CAPSULE, COATED, EXTENDED RELEASE ORAL at 10:35

## 2023-06-27 RX ADMIN — IPRATROPIUM BROMIDE AND ALBUTEROL SULFATE 1 DOSE: .5; 2.5 SOLUTION RESPIRATORY (INHALATION) at 14:00

## 2023-06-27 RX ADMIN — PIRFENIDONE 1 TABLET: 267 TABLET, FILM COATED ORAL at 16:48

## 2023-06-27 RX ADMIN — PANTOPRAZOLE SODIUM 40 MG: 40 TABLET, DELAYED RELEASE ORAL at 05:59

## 2023-06-27 RX ADMIN — ENOXAPARIN SODIUM 40 MG: 100 INJECTION SUBCUTANEOUS at 10:36

## 2023-06-27 RX ADMIN — BENZONATATE 200 MG: 100 CAPSULE ORAL at 20:04

## 2023-06-27 RX ADMIN — BENZONATATE 200 MG: 100 CAPSULE ORAL at 10:45

## 2023-06-27 RX ADMIN — METHYLPREDNISOLONE SODIUM SUCCINATE 40 MG: 40 INJECTION, POWDER, LYOPHILIZED, FOR SOLUTION INTRAMUSCULAR; INTRAVENOUS at 10:35

## 2023-06-27 RX ADMIN — IPRATROPIUM BROMIDE AND ALBUTEROL SULFATE 1 DOSE: .5; 2.5 SOLUTION RESPIRATORY (INHALATION) at 18:55

## 2023-06-27 RX ADMIN — MIRTAZAPINE 30 MG: 30 TABLET, FILM COATED ORAL at 20:04

## 2023-06-27 RX ADMIN — ATORVASTATIN CALCIUM 40 MG: 40 TABLET, FILM COATED ORAL at 10:35

## 2023-06-27 RX ADMIN — FERROUS SULFATE TAB 325 MG (65 MG ELEMENTAL FE) 325 MG: 325 (65 FE) TAB at 10:45

## 2023-06-27 RX ADMIN — SODIUM CHLORIDE, PRESERVATIVE FREE 10 ML: 5 INJECTION INTRAVENOUS at 20:05

## 2023-06-27 RX ADMIN — PIRFENIDONE 1 TABLET: 267 TABLET, FILM COATED ORAL at 10:47

## 2023-06-27 ASSESSMENT — PAIN SCALES - GENERAL: PAINLEVEL_OUTOF10: 1

## 2023-06-27 ASSESSMENT — PAIN SCALES - WONG BAKER: WONGBAKER_NUMERICALRESPONSE: 0

## 2023-06-28 ENCOUNTER — TELEPHONE (OUTPATIENT)
Dept: FAMILY MEDICINE CLINIC | Age: 68
End: 2023-06-28

## 2023-06-28 LAB
BACTERIA BLD CULT ORG #2: NORMAL
BACTERIA BLD CULT: NORMAL
GLUCOSE BLD-MCNC: 132 MG/DL (ref 70–99)
GLUCOSE BLD-MCNC: 211 MG/DL (ref 70–99)
GLUCOSE BLD-MCNC: 269 MG/DL (ref 70–99)
GLUCOSE BLD-MCNC: 96 MG/DL (ref 70–99)
PERFORMED ON: ABNORMAL
PERFORMED ON: NORMAL

## 2023-06-28 PROCEDURE — 2580000003 HC RX 258: Performed by: STUDENT IN AN ORGANIZED HEALTH CARE EDUCATION/TRAINING PROGRAM

## 2023-06-28 PROCEDURE — 6370000000 HC RX 637 (ALT 250 FOR IP): Performed by: INTERNAL MEDICINE

## 2023-06-28 PROCEDURE — 99232 SBSQ HOSP IP/OBS MODERATE 35: CPT

## 2023-06-28 PROCEDURE — 6360000002 HC RX W HCPCS: Performed by: STUDENT IN AN ORGANIZED HEALTH CARE EDUCATION/TRAINING PROGRAM

## 2023-06-28 PROCEDURE — 6370000000 HC RX 637 (ALT 250 FOR IP): Performed by: STUDENT IN AN ORGANIZED HEALTH CARE EDUCATION/TRAINING PROGRAM

## 2023-06-28 PROCEDURE — 94640 AIRWAY INHALATION TREATMENT: CPT

## 2023-06-28 PROCEDURE — 99232 SBSQ HOSP IP/OBS MODERATE 35: CPT | Performed by: INTERNAL MEDICINE

## 2023-06-28 PROCEDURE — 2700000000 HC OXYGEN THERAPY PER DAY

## 2023-06-28 PROCEDURE — 2060000000 HC ICU INTERMEDIATE R&B

## 2023-06-28 PROCEDURE — 94761 N-INVAS EAR/PLS OXIMETRY MLT: CPT

## 2023-06-28 PROCEDURE — 94660 CPAP INITIATION&MGMT: CPT

## 2023-06-28 RX ADMIN — IPRATROPIUM BROMIDE AND ALBUTEROL SULFATE 1 DOSE: .5; 2.5 SOLUTION RESPIRATORY (INHALATION) at 07:14

## 2023-06-28 RX ADMIN — BENZONATATE 200 MG: 100 CAPSULE ORAL at 15:12

## 2023-06-28 RX ADMIN — ENOXAPARIN SODIUM 40 MG: 100 INJECTION SUBCUTANEOUS at 09:32

## 2023-06-28 RX ADMIN — ATORVASTATIN CALCIUM 40 MG: 40 TABLET, FILM COATED ORAL at 09:30

## 2023-06-28 RX ADMIN — IPRATROPIUM BROMIDE AND ALBUTEROL SULFATE 1 DOSE: .5; 2.5 SOLUTION RESPIRATORY (INHALATION) at 20:33

## 2023-06-28 RX ADMIN — ACETAMINOPHEN 650 MG: 325 TABLET ORAL at 20:34

## 2023-06-28 RX ADMIN — PIRFENIDONE 1 TABLET: 267 TABLET, FILM COATED ORAL at 20:34

## 2023-06-28 RX ADMIN — LEVOTHYROXINE SODIUM 100 MCG: 100 TABLET ORAL at 05:57

## 2023-06-28 RX ADMIN — DILTIAZEM HYDROCHLORIDE 240 MG: 240 CAPSULE, COATED, EXTENDED RELEASE ORAL at 09:31

## 2023-06-28 RX ADMIN — PIRFENIDONE 1 TABLET: 267 TABLET, FILM COATED ORAL at 15:06

## 2023-06-28 RX ADMIN — PIRFENIDONE 1 TABLET: 267 TABLET, FILM COATED ORAL at 09:31

## 2023-06-28 RX ADMIN — HYDROXYZINE HYDROCHLORIDE 10 MG: 10 TABLET ORAL at 15:06

## 2023-06-28 RX ADMIN — FERROUS SULFATE TAB 325 MG (65 MG ELEMENTAL FE) 325 MG: 325 (65 FE) TAB at 09:30

## 2023-06-28 RX ADMIN — SERTRALINE 200 MG: 100 TABLET, FILM COATED ORAL at 09:30

## 2023-06-28 RX ADMIN — LORAZEPAM 0.5 MG: 0.5 TABLET ORAL at 20:34

## 2023-06-28 RX ADMIN — SODIUM CHLORIDE, PRESERVATIVE FREE 10 ML: 5 INJECTION INTRAVENOUS at 09:33

## 2023-06-28 RX ADMIN — INSULIN LISPRO 2 UNITS: 100 INJECTION, SOLUTION INTRAVENOUS; SUBCUTANEOUS at 17:08

## 2023-06-28 RX ADMIN — POTASSIUM CHLORIDE 20 MEQ: 750 TABLET, EXTENDED RELEASE ORAL at 09:31

## 2023-06-28 RX ADMIN — METHYLPREDNISOLONE SODIUM SUCCINATE 40 MG: 40 INJECTION, POWDER, LYOPHILIZED, FOR SOLUTION INTRAMUSCULAR; INTRAVENOUS at 09:34

## 2023-06-28 RX ADMIN — NADOLOL 20 MG: 40 TABLET ORAL at 09:30

## 2023-06-28 RX ADMIN — ACETAMINOPHEN 650 MG: 325 TABLET ORAL at 04:13

## 2023-06-28 RX ADMIN — PANTOPRAZOLE SODIUM 40 MG: 40 TABLET, DELAYED RELEASE ORAL at 05:57

## 2023-06-28 RX ADMIN — IPRATROPIUM BROMIDE AND ALBUTEROL SULFATE 1 DOSE: .5; 2.5 SOLUTION RESPIRATORY (INHALATION) at 14:14

## 2023-06-28 ASSESSMENT — PAIN SCALES - GENERAL
PAINLEVEL_OUTOF10: 3
PAINLEVEL_OUTOF10: 7

## 2023-06-28 ASSESSMENT — PAIN SCALES - WONG BAKER: WONGBAKER_NUMERICALRESPONSE: 2

## 2023-06-28 ASSESSMENT — PAIN DESCRIPTION - LOCATION: LOCATION: HEAD

## 2023-06-28 ASSESSMENT — PAIN DESCRIPTION - DESCRIPTORS: DESCRIPTORS: ACHING

## 2023-06-29 LAB
GLUCOSE BLD-MCNC: 107 MG/DL (ref 70–99)
GLUCOSE BLD-MCNC: 107 MG/DL (ref 70–99)
GLUCOSE BLD-MCNC: 231 MG/DL (ref 70–99)
GLUCOSE BLD-MCNC: 240 MG/DL (ref 70–99)
PERFORMED ON: ABNORMAL

## 2023-06-29 PROCEDURE — 6370000000 HC RX 637 (ALT 250 FOR IP): Performed by: INTERNAL MEDICINE

## 2023-06-29 PROCEDURE — 99232 SBSQ HOSP IP/OBS MODERATE 35: CPT | Performed by: INTERNAL MEDICINE

## 2023-06-29 PROCEDURE — 2700000000 HC OXYGEN THERAPY PER DAY

## 2023-06-29 PROCEDURE — 94761 N-INVAS EAR/PLS OXIMETRY MLT: CPT

## 2023-06-29 PROCEDURE — 94640 AIRWAY INHALATION TREATMENT: CPT

## 2023-06-29 PROCEDURE — 2580000003 HC RX 258: Performed by: STUDENT IN AN ORGANIZED HEALTH CARE EDUCATION/TRAINING PROGRAM

## 2023-06-29 PROCEDURE — 6370000000 HC RX 637 (ALT 250 FOR IP): Performed by: STUDENT IN AN ORGANIZED HEALTH CARE EDUCATION/TRAINING PROGRAM

## 2023-06-29 PROCEDURE — 99232 SBSQ HOSP IP/OBS MODERATE 35: CPT

## 2023-06-29 PROCEDURE — 2060000000 HC ICU INTERMEDIATE R&B

## 2023-06-29 PROCEDURE — 2500000003 HC RX 250 WO HCPCS: Performed by: STUDENT IN AN ORGANIZED HEALTH CARE EDUCATION/TRAINING PROGRAM

## 2023-06-29 PROCEDURE — 6360000002 HC RX W HCPCS: Performed by: STUDENT IN AN ORGANIZED HEALTH CARE EDUCATION/TRAINING PROGRAM

## 2023-06-29 PROCEDURE — 94660 CPAP INITIATION&MGMT: CPT

## 2023-06-29 RX ADMIN — ATORVASTATIN CALCIUM 40 MG: 40 TABLET, FILM COATED ORAL at 10:07

## 2023-06-29 RX ADMIN — PIRFENIDONE 1 TABLET: 267 TABLET, FILM COATED ORAL at 15:48

## 2023-06-29 RX ADMIN — PIRFENIDONE 1 TABLET: 267 TABLET, FILM COATED ORAL at 10:06

## 2023-06-29 RX ADMIN — NADOLOL 20 MG: 40 TABLET ORAL at 10:07

## 2023-06-29 RX ADMIN — PIRFENIDONE 1 TABLET: 267 TABLET, FILM COATED ORAL at 20:12

## 2023-06-29 RX ADMIN — METHYLPREDNISOLONE SODIUM SUCCINATE 40 MG: 40 INJECTION, POWDER, LYOPHILIZED, FOR SOLUTION INTRAMUSCULAR; INTRAVENOUS at 10:10

## 2023-06-29 RX ADMIN — SERTRALINE 200 MG: 100 TABLET, FILM COATED ORAL at 10:07

## 2023-06-29 RX ADMIN — MIRTAZAPINE 30 MG: 30 TABLET, FILM COATED ORAL at 20:12

## 2023-06-29 RX ADMIN — IPRATROPIUM BROMIDE AND ALBUTEROL SULFATE 1 DOSE: .5; 2.5 SOLUTION RESPIRATORY (INHALATION) at 12:48

## 2023-06-29 RX ADMIN — PANTOPRAZOLE SODIUM 40 MG: 40 TABLET, DELAYED RELEASE ORAL at 10:07

## 2023-06-29 RX ADMIN — POTASSIUM CHLORIDE 20 MEQ: 750 TABLET, EXTENDED RELEASE ORAL at 10:07

## 2023-06-29 RX ADMIN — LEVOTHYROXINE SODIUM 100 MCG: 100 TABLET ORAL at 10:07

## 2023-06-29 RX ADMIN — SODIUM CHLORIDE, PRESERVATIVE FREE 10 ML: 5 INJECTION INTRAVENOUS at 20:12

## 2023-06-29 RX ADMIN — DILTIAZEM HYDROCHLORIDE 240 MG: 240 CAPSULE, COATED, EXTENDED RELEASE ORAL at 10:08

## 2023-06-29 RX ADMIN — ENOXAPARIN SODIUM 40 MG: 100 INJECTION SUBCUTANEOUS at 10:08

## 2023-06-29 RX ADMIN — HYDROXYZINE HYDROCHLORIDE 10 MG: 10 TABLET ORAL at 20:12

## 2023-06-29 RX ADMIN — TRAMADOL HYDROCHLORIDE 50 MG: 50 TABLET ORAL at 23:54

## 2023-06-29 RX ADMIN — INSULIN LISPRO 1 UNITS: 100 INJECTION, SOLUTION INTRAVENOUS; SUBCUTANEOUS at 17:19

## 2023-06-29 RX ADMIN — MICONAZOLE NITRATE: 20 POWDER TOPICAL at 20:12

## 2023-06-29 RX ADMIN — FERROUS SULFATE TAB 325 MG (65 MG ELEMENTAL FE) 325 MG: 325 (65 FE) TAB at 10:08

## 2023-06-29 RX ADMIN — SODIUM CHLORIDE, PRESERVATIVE FREE 10 ML: 5 INJECTION INTRAVENOUS at 10:11

## 2023-06-29 RX ADMIN — ONDANSETRON 4 MG: 2 INJECTION INTRAMUSCULAR; INTRAVENOUS at 12:21

## 2023-06-29 RX ADMIN — BENZONATATE 200 MG: 100 CAPSULE ORAL at 20:12

## 2023-06-29 RX ADMIN — IPRATROPIUM BROMIDE AND ALBUTEROL SULFATE 1 DOSE: .5; 2.5 SOLUTION RESPIRATORY (INHALATION) at 19:48

## 2023-06-29 ASSESSMENT — PAIN DESCRIPTION - DESCRIPTORS: DESCRIPTORS: DULL;STABBING

## 2023-06-29 ASSESSMENT — PAIN DESCRIPTION - LOCATION: LOCATION: BACK

## 2023-06-29 ASSESSMENT — PAIN SCALES - GENERAL
PAINLEVEL_OUTOF10: 0
PAINLEVEL_OUTOF10: 6

## 2023-06-30 VITALS
TEMPERATURE: 97.8 F | OXYGEN SATURATION: 94 % | HEIGHT: 63 IN | RESPIRATION RATE: 22 BRPM | SYSTOLIC BLOOD PRESSURE: 147 MMHG | HEART RATE: 88 BPM | BODY MASS INDEX: 21.42 KG/M2 | WEIGHT: 120.9 LBS | DIASTOLIC BLOOD PRESSURE: 69 MMHG

## 2023-06-30 LAB
GLUCOSE BLD-MCNC: 124 MG/DL (ref 70–99)
GLUCOSE BLD-MCNC: 163 MG/DL (ref 70–99)
PERFORMED ON: ABNORMAL
PERFORMED ON: ABNORMAL

## 2023-06-30 PROCEDURE — 2700000000 HC OXYGEN THERAPY PER DAY

## 2023-06-30 PROCEDURE — 99238 HOSP IP/OBS DSCHRG MGMT 30/<: CPT | Performed by: INTERNAL MEDICINE

## 2023-06-30 PROCEDURE — 99232 SBSQ HOSP IP/OBS MODERATE 35: CPT | Performed by: INTERNAL MEDICINE

## 2023-06-30 PROCEDURE — 6370000000 HC RX 637 (ALT 250 FOR IP): Performed by: STUDENT IN AN ORGANIZED HEALTH CARE EDUCATION/TRAINING PROGRAM

## 2023-06-30 PROCEDURE — 2580000003 HC RX 258: Performed by: STUDENT IN AN ORGANIZED HEALTH CARE EDUCATION/TRAINING PROGRAM

## 2023-06-30 PROCEDURE — 6370000000 HC RX 637 (ALT 250 FOR IP): Performed by: INTERNAL MEDICINE

## 2023-06-30 PROCEDURE — 94761 N-INVAS EAR/PLS OXIMETRY MLT: CPT

## 2023-06-30 PROCEDURE — 94640 AIRWAY INHALATION TREATMENT: CPT

## 2023-06-30 PROCEDURE — 6360000002 HC RX W HCPCS: Performed by: STUDENT IN AN ORGANIZED HEALTH CARE EDUCATION/TRAINING PROGRAM

## 2023-06-30 RX ORDER — PREDNISONE 10 MG/1
TABLET ORAL
Qty: 80 TABLET | Refills: 0 | Status: SHIPPED | OUTPATIENT
Start: 2023-06-30 | End: 2023-08-19

## 2023-06-30 RX ADMIN — LEVOTHYROXINE SODIUM 100 MCG: 100 TABLET ORAL at 06:12

## 2023-06-30 RX ADMIN — IPRATROPIUM BROMIDE AND ALBUTEROL SULFATE 1 DOSE: .5; 2.5 SOLUTION RESPIRATORY (INHALATION) at 07:35

## 2023-06-30 RX ADMIN — HYDROXYZINE HYDROCHLORIDE 10 MG: 10 TABLET ORAL at 14:05

## 2023-06-30 RX ADMIN — SERTRALINE 200 MG: 100 TABLET, FILM COATED ORAL at 08:59

## 2023-06-30 RX ADMIN — ATORVASTATIN CALCIUM 40 MG: 40 TABLET, FILM COATED ORAL at 09:00

## 2023-06-30 RX ADMIN — NADOLOL 20 MG: 40 TABLET ORAL at 08:59

## 2023-06-30 RX ADMIN — PIRFENIDONE 1 TABLET: 267 TABLET, FILM COATED ORAL at 09:00

## 2023-06-30 RX ADMIN — ENOXAPARIN SODIUM 40 MG: 100 INJECTION SUBCUTANEOUS at 09:00

## 2023-06-30 RX ADMIN — PANTOPRAZOLE SODIUM 40 MG: 40 TABLET, DELAYED RELEASE ORAL at 06:12

## 2023-06-30 RX ADMIN — FERROUS SULFATE TAB 325 MG (65 MG ELEMENTAL FE) 325 MG: 325 (65 FE) TAB at 09:00

## 2023-06-30 RX ADMIN — TRAMADOL HYDROCHLORIDE 50 MG: 50 TABLET ORAL at 13:18

## 2023-06-30 RX ADMIN — PREDNISONE 30 MG: 20 TABLET ORAL at 09:00

## 2023-06-30 RX ADMIN — POTASSIUM CHLORIDE 20 MEQ: 750 TABLET, EXTENDED RELEASE ORAL at 09:00

## 2023-06-30 RX ADMIN — SODIUM CHLORIDE, PRESERVATIVE FREE 10 ML: 5 INJECTION INTRAVENOUS at 09:05

## 2023-06-30 RX ADMIN — DILTIAZEM HYDROCHLORIDE 240 MG: 240 CAPSULE, COATED, EXTENDED RELEASE ORAL at 09:00

## 2023-06-30 RX ADMIN — ONDANSETRON 4 MG: 4 TABLET, ORALLY DISINTEGRATING ORAL at 09:50

## 2023-06-30 ASSESSMENT — PAIN DESCRIPTION - LOCATION: LOCATION: HEAD

## 2023-06-30 ASSESSMENT — PAIN SCALES - GENERAL
PAINLEVEL_OUTOF10: 4
PAINLEVEL_OUTOF10: 5

## 2023-06-30 ASSESSMENT — PAIN DESCRIPTION - DESCRIPTORS: DESCRIPTORS: ACHING

## 2023-06-30 ASSESSMENT — PAIN - FUNCTIONAL ASSESSMENT: PAIN_FUNCTIONAL_ASSESSMENT: ACTIVITIES ARE NOT PREVENTED

## 2023-06-30 ASSESSMENT — PAIN SCALES - WONG BAKER: WONGBAKER_NUMERICALRESPONSE: 2

## 2023-07-03 ENCOUNTER — CARE COORDINATION (OUTPATIENT)
Dept: CASE MANAGEMENT | Age: 68
End: 2023-07-03

## 2023-07-03 DIAGNOSIS — J96.22 ACUTE ON CHRONIC RESPIRATORY FAILURE WITH HYPOXIA AND HYPERCAPNIA (HCC): Primary | ICD-10-CM

## 2023-07-03 DIAGNOSIS — J96.21 ACUTE ON CHRONIC RESPIRATORY FAILURE WITH HYPOXIA AND HYPERCAPNIA (HCC): Primary | ICD-10-CM

## 2023-07-03 PROCEDURE — 1111F DSCHRG MED/CURRENT MED MERGE: CPT | Performed by: FAMILY MEDICINE

## 2023-07-03 RX ORDER — LEVOTHYROXINE SODIUM 0.1 MG/1
TABLET ORAL
Qty: 30 TABLET | Refills: 3 | OUTPATIENT
Start: 2023-07-03

## 2023-07-03 RX ORDER — OMEPRAZOLE 40 MG/1
40 CAPSULE, DELAYED RELEASE ORAL DAILY
COMMUNITY

## 2023-07-03 RX ORDER — FERROUS SULFATE 325(65) MG
TABLET ORAL
Qty: 30 TABLET | Refills: 3 | OUTPATIENT
Start: 2023-07-03

## 2023-07-03 NOTE — CARE COORDINATION
available to patient? Yes. Reviewed appropriate site of care based on symptoms and resources available to patient including: PCP  Specialist. The patient agrees to contact the PCP office for questions related to their healthcare. Advance Care Planning:   Does patient have an Advance Directive: reviewed and current. Medication reconciliation was performed with patient, who verbalizes understanding of administration of home medications. Medications reviewed, 1111F entered: yes    Was patient discharged with a pulse oximeter? no    Non-face-to-face services provided:  Obtained and reviewed discharge summary and/or continuity of care documents  Education of patient/family/caregiver/guardian to support self-management-   Assessment and support for treatment adherence and medication management-     Offered patient enrollment in the Remote Patient Monitoring (RPM) program for in-home monitoring: Patient is not eligible for RPM program.    Care Transitions 24 Hour Call    Schedule Follow Up Appointment with PCP: Completed  Do you have a copy of your discharge instructions?: Yes  Do you have all of your prescriptions and are they filled?: Yes  Have you been contacted by a Andrea Calloway Pharmacist?: No  Have you scheduled your follow up appointment?: Yes  How are you going to get to your appointment?: Other  Post Acute Services: new referral to [address] [place] [address]  Patient DME: Layla Johansen Shower chair  Patient Home Equipment: Nebulizer, Oxygen  Do you have support at home?: Partner/Spouse/SO  Do you feel like you have everything you need to keep you well at home?: Yes  Are you an active caregiver in your home?: No  Care Transitions Interventions  No Identified Needs       Discussed follow-up appointments. If no appointment was previously scheduled, appointment scheduling offered: Yes. Is follow up appointment scheduled within 7 days of discharge?  No.    Follow Up  Future Appointments   Date Time Provider [address] [place] [address]   7/11/2023

## 2023-07-07 ENCOUNTER — TELEPHONE (OUTPATIENT)
Dept: PULMONOLOGY | Age: 68
End: 2023-07-07

## 2023-07-07 ENCOUNTER — CARE COORDINATION (OUTPATIENT)
Dept: CASE MANAGEMENT | Age: 68
End: 2023-07-07

## 2023-07-07 RX ORDER — NADOLOL 20 MG/1
TABLET ORAL
Qty: 30 TABLET | Refills: 3 | OUTPATIENT
Start: 2023-07-07

## 2023-07-07 NOTE — CARE COORDINATION
[facility] Care Transitions Follow Up Call      Patient: Michael Haro  Patient : 1955   MRN: [id number]  Reason for Admission: 7 days -> acute on chronic hypoxic resp failure, acute COPD exac, acute ILD exac, paroxysmal A Fib not on AC 2/2 anemia/epistaxis, HTN-controlled, hypokalemia, hyperglycemia suspect 2/2 steroids (A1c 5.8%), anemia-take iron OTC outpt -> home with no services, dtr is HHA through [employer], new referral to [facility]  Discharge Date: 23 RARS: Readmission Risk Score: 38.6    CTN attempted follow-up outreach to patient. Message left including CTN contact information. Sees PCP Tuesday. CTN will follow up after that.      Follow Up  Future Appointments   Date Time Provider [address] [place] [address]   2023  9:30 AM Amarilys Alexandre MD [facility] Remy Stephen RN  Care Transition Nurse  [phone] mobile

## 2023-07-11 ENCOUNTER — TELEMEDICINE (OUTPATIENT)
Dept: FAMILY MEDICINE CLINIC | Age: 68
End: 2023-07-11
Payer: MEDICARE

## 2023-07-11 DIAGNOSIS — E87.6 HYPOKALEMIA: ICD-10-CM

## 2023-07-11 DIAGNOSIS — Z09 HOSPITAL DISCHARGE FOLLOW-UP: ICD-10-CM

## 2023-07-11 DIAGNOSIS — R73.9 HYPERGLYCEMIA: ICD-10-CM

## 2023-07-11 DIAGNOSIS — J84.9 ILD (INTERSTITIAL LUNG DISEASE) (HCC): ICD-10-CM

## 2023-07-11 DIAGNOSIS — D64.9 ANEMIA, UNSPECIFIED TYPE: ICD-10-CM

## 2023-07-11 DIAGNOSIS — J96.21 ACUTE ON CHRONIC RESPIRATORY FAILURE WITH HYPOXIA (HCC): Primary | ICD-10-CM

## 2023-07-11 DIAGNOSIS — I10 PRIMARY HYPERTENSION: ICD-10-CM

## 2023-07-11 DIAGNOSIS — I48.0 PAF (PAROXYSMAL ATRIAL FIBRILLATION) (HCC): ICD-10-CM

## 2023-07-11 DIAGNOSIS — J44.1 COPD EXACERBATION (HCC): ICD-10-CM

## 2023-07-11 PROCEDURE — 1111F DSCHRG MED/CURRENT MED MERGE: CPT | Performed by: FAMILY MEDICINE

## 2023-07-11 PROCEDURE — 3017F COLORECTAL CA SCREEN DOC REV: CPT | Performed by: FAMILY MEDICINE

## 2023-07-11 PROCEDURE — 3023F SPIROM DOC REV: CPT | Performed by: FAMILY MEDICINE

## 2023-07-11 PROCEDURE — 1123F ACP DISCUSS/DSCN MKR DOCD: CPT | Performed by: FAMILY MEDICINE

## 2023-07-11 PROCEDURE — G8400 PT W/DXA NO RESULTS DOC: HCPCS | Performed by: FAMILY MEDICINE

## 2023-07-11 PROCEDURE — 1036F TOBACCO NON-USER: CPT | Performed by: FAMILY MEDICINE

## 2023-07-11 PROCEDURE — G8428 CUR MEDS NOT DOCUMENT: HCPCS | Performed by: FAMILY MEDICINE

## 2023-07-11 PROCEDURE — 99215 OFFICE O/P EST HI 40 MIN: CPT | Performed by: FAMILY MEDICINE

## 2023-07-11 PROCEDURE — G8420 CALC BMI NORM PARAMETERS: HCPCS | Performed by: FAMILY MEDICINE

## 2023-07-11 PROCEDURE — 1090F PRES/ABSN URINE INCON ASSESS: CPT | Performed by: FAMILY MEDICINE

## 2023-07-11 RX ORDER — LANOLIN ALCOHOL/MO/W.PET/CERES
3 CREAM (GRAM) TOPICAL
COMMUNITY
Start: 2023-05-01

## 2023-07-11 NOTE — TELEPHONE ENCOUNTER
Per Dr. Delmis Leonardo Do to patient condition we will hold off of Watchman right now and he will re-evaluate her at patients next appointment.

## 2023-07-11 NOTE — PROGRESS NOTES
Active Problem List   Diagnosis    HTN (hypertension)    Hyperlipidemia with target LDL less than 130    Hypokalemia    Insomnia    Trochanteric bursitis of both hips    Migraine    Syncope    Fatigue    Hypothyroidism    COPD exacerbation (HCC)    Mild single current episode of major depressive disorder (HCC)    Anxiety    Pneumonia of both lungs due to infectious organism    Atypical pneumonia    Acute on chronic diastolic CHF (congestive heart failure) ([facility])    Class 2 obesity with body mass index (BMI) of 39.0 to 39.9 in adult    Restrictive lung disease    SOB (shortness of breath)    Cryptogenic organizing pneumonia ([address])    Type 2 diabetes mellitus without complication (HCC)    Hyponatremia    Numbness and tingling in left hand    Ulnar neuropathy at elbow of left upper extremity    Left wrist pain    Left wrist tendinitis    GERD (gastroesophageal reflux disease)    Toxic metabolic encephalopathy    VIRGINIE (acute kidney injury) ([address])    Lumbar radiculopathy    Rib pain on left side    Elevated lactic acid level    Fracture of multiple ribs of left side    Hypomagnesemia    Depression    Chronic diastolic congestive heart failure (HCC)    Burst fracture of lumbar vertebra ([facility])    H/O Spinal surgery    Severe malnutrition ([facility])    Pneumonia due to COVID-19 virus    COVID-19    Diarrhea    PSVT (paroxysmal supraventricular tachycardia) ([facility])    PAF (paroxysmal atrial fibrillation) ([facility])    SVT (supraventricular tachycardia) ([facility])    Dependence on supplemental oxygen    Atrial fibrillation with RVR ([facility])    Chronic hypoxemic respiratory failure ([facility])    History of COPD    Acute on chronic respiratory failure with hypoxia and hypercapnia (HCC)    Acute on chronic respiratory failure with hypoxia (HCC)    Acute respiratory failure with hypoxia (HCC)    ILD (interstitial lung disease) ([facility])    Acute and chr resp failure, unsp w hypoxia or hypercapnia ([facility])    Respiratory failure ([facility])    Elevated troponin

## 2023-07-12 NOTE — TELEPHONE ENCOUNTER
ASSESSMENT:  Acute on chronic hypoxemic respiratory failure; baseline 4 L rest & 8 L exertion   Progressive ILD with acute exacerbation; h/o cryptogenic organizing pneumonia and possibly coexisting NSIP or chronic HP, f/b Dr. Michael Espinosa  pAFIB  COPD/emphysema   Iron deficiency anemia  Never smoker      PLAN:  Supplemental oxygen to maintain SaO2 >92%; wean as tolerated    AVAPS EPAP 4, IPAP 10-20, rate 12, TV 6 mL/kg IBW, HS & PRN. Use home Trilogy, set up 6/11/23 [facility]). RTs have aiding pt in use this admission. Prednisone 30 X 10, 20 X 10, then 10 mg daily, see Yosef Davis in 2-3 weeks   Inhaled bronchodilators   Pirfenidone TID home med, unable to tolerate higher doses  I was clear with patient that my recommendation is for home. Clinically she is ready for discharge. She is going to work on having someone with her at home. I spent a total of 10 minutes on this encounter personally obtaining the patient history, reviewing labs, imaging and other data. I independently performed the above physical exam and updated this encounter note, assessment and plan as needed. Mckinley Quezada MD on 6/30/23 at 8:18 AM EDT    I spent a total of 6 minutes on this encounter on chart review, history taking and review of data. I updated this encounter note as needed.    Steven Simon PA-C on 6/30/23 at 6:46 AM EDT

## 2023-07-12 NOTE — TELEPHONE ENCOUNTER
Serge Kimbrough with [facility] called states even with pt being on 8LPM bled into her NIV pt is sating at 69%

## 2023-07-13 ENCOUNTER — CARE COORDINATION (OUTPATIENT)
Dept: CASE MANAGEMENT | Age: 68
End: 2023-07-13

## 2023-07-13 NOTE — CARE COORDINATION
[facility] Care Transitions Follow Up Call    Patient Current Location: Home: [address] [place] [address] Drive [address]    Care Transition Nurse contacted the family by telephone to follow up after recent hospital admission. Patient: Michaela Obando  Patient : 1955   MRN: [id number]  Reason for Admission: 7 days -> acute on chronic hypoxic resp failure, acute COPD exac, acute ILD exac, paroxysmal A Fib not on AC 2/2 anemia/epistaxis, HTN-controlled, hypokalemia, hyperglycemia suspect 2/2 steroids (A1c 5.8%), anemia-take iron OTC outpt -> home with no services, dtr is HHA through [employer]port, new referral to [facility]  Discharge Date: 23 RARS: Readmission Risk Score: 38.6      Needs to be reviewed by the provider   Additional needs identified to be addressed with provider: No         Method of communication with provider: none. Spoke with dtr ok per hipaa. States pulm office working on getting RT out to home working with her to get equipment working better. SaO2 pretty good. No news about pulm rehab yet. States one thing at a time. She just got phone call yesterday from [facility]. She plans to schedule appt with pulm as recommended. Denies needs. Addressed changes since last contact:   TCM visit  Discussed follow-up appointments. If no appointment was previously scheduled, appointment scheduling offered: Yes. Is follow up appointment scheduled within 7 days of discharge? completed. Follow Up  No future appointments. Care Transition Nurse reviewed medical action plan and red flags with family and discussed any barriers to care and/or understanding of plan of care after discharge. Discussed appropriate site of care based on symptoms and resources available to patient including: PCP  Specialist. The family agrees to contact the PCP office for questions related to their healthcare.       Patients top risk factors for readmission: functional cognitive ability, functional physical ability,

## 2023-07-14 ENCOUNTER — TELEPHONE (OUTPATIENT)
Dept: PULMONOLOGY | Age: 68
End: 2023-07-14

## 2023-07-14 NOTE — TELEPHONE ENCOUNTER
Pt called stating she is having trouble using her cpap, she called addy for advice and they stated Dr. Fox Eduardo cancelled use of her machine. PT states she wants to use her machine and just needs help learning how to use it. Please advise.

## 2023-07-16 PROBLEM — D64.9 ABSOLUTE ANEMIA: Status: ACTIVE | Noted: 2023-07-16

## 2023-07-16 PROBLEM — T38.0X5A STEROID-INDUCED HYPERGLYCEMIA: Status: ACTIVE | Noted: 2023-07-16

## 2023-07-16 PROBLEM — R73.9 STEROID-INDUCED HYPERGLYCEMIA: Status: ACTIVE | Noted: 2023-07-16

## 2023-07-17 NOTE — TELEPHONE ENCOUNTER
Joey from [facility] called stating that pt is on NIV and having oxygenation issues while using machine. [facility] asks if pressures could be adjusted or if pt should have a titration study? If pressures are changed, fax an order to [phone].

## 2023-07-19 ENCOUNTER — APPOINTMENT (OUTPATIENT)
Dept: GENERAL RADIOLOGY | Age: 68
DRG: 189 | End: 2023-07-19
Payer: MEDICARE

## 2023-07-19 ENCOUNTER — TELEPHONE (OUTPATIENT)
Dept: OTHER | Facility: CLINIC | Age: 68
End: 2023-07-19

## 2023-07-19 ENCOUNTER — HOSPITAL ENCOUNTER (INPATIENT)
Age: 68
LOS: 5 days | Discharge: HOME | DRG: 189 | End: 2023-07-24
Attending: EMERGENCY MEDICINE | Admitting: PEDIATRICS
Payer: MEDICARE

## 2023-07-19 DIAGNOSIS — E87.0 HYPERNATREMIA: ICD-10-CM

## 2023-07-19 DIAGNOSIS — M54.16 LUMBAR RADICULOPATHY: ICD-10-CM

## 2023-07-19 DIAGNOSIS — R06.02 SHORTNESS OF BREATH: ICD-10-CM

## 2023-07-19 DIAGNOSIS — J96.21 ACUTE ON CHRONIC RESPIRATORY FAILURE WITH HYPOXIA (HCC): Primary | ICD-10-CM

## 2023-07-19 DIAGNOSIS — R77.8 ELEVATED TROPONIN: ICD-10-CM

## 2023-07-19 PROBLEM — J96.00 ACUTE RESPIRATORY FAILURE (HCC): Status: ACTIVE | Noted: 2023-07-19

## 2023-07-19 LAB
ALBUMIN SERPL-MCNC: 3.8 G/DL (ref 3.4–5)
ALBUMIN/GLOB SERPL: 1.8 {RATIO} (ref 1.1–2.2)
ALP SERPL-CCNC: 73 U/L (ref 40–129)
ALT SERPL-CCNC: 30 U/L (ref 10–40)
ANION GAP SERPL CALCULATED.3IONS-SCNC: 10 MMOL/L (ref 3–16)
AST SERPL-CCNC: 41 U/L (ref 15–37)
BASE EXCESS BLDV CALC-SCNC: 10.4 MMOL/L (ref -3–3)
BASE EXCESS BLDV CALC-SCNC: 12.9 MMOL/L (ref -3–3)
BASOPHILS # BLD: 0.1 K/UL (ref 0–0.2)
BASOPHILS NFR BLD: 0.7 %
BILIRUB SERPL-MCNC: 0.4 MG/DL (ref 0–1)
BUN SERPL-MCNC: 15 MG/DL (ref 7–20)
CALCIUM SERPL-MCNC: 8.8 MG/DL (ref 8.3–10.6)
CHLORIDE SERPL-SCNC: 102 MMOL/L (ref 99–110)
CO2 BLDV-SCNC: 38 MMOL/L
CO2 BLDV-SCNC: 42 MMOL/L
CO2 SERPL-SCNC: 37 MMOL/L (ref 21–32)
COHGB MFR BLDV: 3.1 % (ref 0–1.5)
COHGB MFR BLDV: 4.5 % (ref 0–1.5)
CREAT SERPL-MCNC: 0.6 MG/DL (ref 0.6–1.2)
DEPRECATED RDW RBC AUTO: 22.5 % (ref 12.4–15.4)
EOSINOPHIL # BLD: 0.4 K/UL (ref 0–0.6)
EOSINOPHIL NFR BLD: 4.1 %
GFR SERPLBLD CREATININE-BSD FMLA CKD-EPI: >60 ML/MIN/{1.73_M2}
GLUCOSE SERPL-MCNC: 140 MG/DL (ref 70–99)
HCO3 BLDV-SCNC: 35.9 MMOL/L (ref 23–29)
HCO3 BLDV-SCNC: 40 MMOL/L (ref 23–29)
HCT VFR BLD AUTO: 36.5 % (ref 36–48)
HGB BLD-MCNC: 11.2 G/DL (ref 12–16)
LACTATE BLDV-SCNC: 2.1 MMOL/L (ref 0.4–2)
LYMPHOCYTES # BLD: 1.8 K/UL (ref 1–5.1)
LYMPHOCYTES NFR BLD: 18.7 %
MAGNESIUM SERPL-MCNC: 1.8 MG/DL (ref 1.8–2.4)
MCH RBC QN AUTO: 25.5 PG (ref 26–34)
MCHC RBC AUTO-ENTMCNC: 30.8 G/DL (ref 31–36)
MCV RBC AUTO: 82.8 FL (ref 80–100)
METHGB MFR BLDV: 0.3 %
METHGB MFR BLDV: 0.3 %
MONOCYTES # BLD: 0.5 K/UL (ref 0–1.3)
MONOCYTES NFR BLD: 5.6 %
NEUTROPHILS # BLD: 7 K/UL (ref 1.7–7.7)
NEUTROPHILS NFR BLD: 70.9 %
NT-PROBNP SERPL-MCNC: 1898 PG/ML (ref 0–124)
O2 THERAPY: ABNORMAL
O2 THERAPY: ABNORMAL
PCO2 BLDV: 52.1 MMHG (ref 40–50)
PCO2 BLDV: 63.2 MMHG (ref 40–50)
PH BLDV: 7.42 [PH] (ref 7.35–7.45)
PH BLDV: 7.46 [PH] (ref 7.35–7.45)
PLATELET # BLD AUTO: 148 K/UL (ref 135–450)
PMV BLD AUTO: 7.3 FL (ref 5–10.5)
PO2 BLDV: 25.4 MMHG (ref 25–40)
PO2 BLDV: 60.5 MMHG (ref 25–40)
POTASSIUM SERPL-SCNC: 4.1 MMOL/L (ref 3.5–5.1)
PROCALCITONIN SERPL IA-MCNC: 0.06 NG/ML (ref 0–0.15)
PROT SERPL-MCNC: 5.9 G/DL (ref 6.4–8.2)
RBC # BLD AUTO: 4.41 M/UL (ref 4–5.2)
SAO2 % BLDV: 45 %
SAO2 % BLDV: 92 %
SARS-COV-2 RDRP RESP QL NAA+PROBE: NOT DETECTED
SODIUM SERPL-SCNC: 149 MMOL/L (ref 136–145)
TROPONIN, HIGH SENSITIVITY: 20 NG/L (ref 0–14)
WBC # BLD AUTO: 9.9 K/UL (ref 4–11)

## 2023-07-19 PROCEDURE — 83735 ASSAY OF MAGNESIUM: CPT

## 2023-07-19 PROCEDURE — 94761 N-INVAS EAR/PLS OXIMETRY MLT: CPT

## 2023-07-19 PROCEDURE — 6370000000 HC RX 637 (ALT 250 FOR IP): Performed by: INTERNAL MEDICINE

## 2023-07-19 PROCEDURE — 2580000003 HC RX 258: Performed by: EMERGENCY MEDICINE

## 2023-07-19 PROCEDURE — 36415 COLL VENOUS BLD VENIPUNCTURE: CPT

## 2023-07-19 PROCEDURE — 6370000000 HC RX 637 (ALT 250 FOR IP): Performed by: EMERGENCY MEDICINE

## 2023-07-19 PROCEDURE — 85025 COMPLETE CBC W/AUTO DIFF WBC: CPT

## 2023-07-19 PROCEDURE — 96374 THER/PROPH/DIAG INJ IV PUSH: CPT

## 2023-07-19 PROCEDURE — 2700000000 HC OXYGEN THERAPY PER DAY

## 2023-07-19 PROCEDURE — 83880 ASSAY OF NATRIURETIC PEPTIDE: CPT

## 2023-07-19 PROCEDURE — 84484 ASSAY OF TROPONIN QUANT: CPT

## 2023-07-19 PROCEDURE — 94660 CPAP INITIATION&MGMT: CPT

## 2023-07-19 PROCEDURE — 96361 HYDRATE IV INFUSION ADD-ON: CPT

## 2023-07-19 PROCEDURE — 71045 X-RAY EXAM CHEST 1 VIEW: CPT

## 2023-07-19 PROCEDURE — 80053 COMPREHEN METABOLIC PANEL: CPT

## 2023-07-19 PROCEDURE — 6360000002 HC RX W HCPCS: Performed by: EMERGENCY MEDICINE

## 2023-07-19 PROCEDURE — 87635 SARS-COV-2 COVID-19 AMP PRB: CPT

## 2023-07-19 PROCEDURE — 94640 AIRWAY INHALATION TREATMENT: CPT

## 2023-07-19 PROCEDURE — 6360000002 HC RX W HCPCS: Performed by: INTERNAL MEDICINE

## 2023-07-19 PROCEDURE — 93005 ELECTROCARDIOGRAM TRACING: CPT | Performed by: EMERGENCY MEDICINE

## 2023-07-19 PROCEDURE — 2580000003 HC RX 258: Performed by: PEDIATRICS

## 2023-07-19 PROCEDURE — 83605 ASSAY OF LACTIC ACID: CPT

## 2023-07-19 PROCEDURE — 84145 PROCALCITONIN (PCT): CPT

## 2023-07-19 PROCEDURE — 2580000003 HC RX 258: Performed by: INTERNAL MEDICINE

## 2023-07-19 PROCEDURE — 2000000000 HC ICU R&B

## 2023-07-19 PROCEDURE — 82803 BLOOD GASES ANY COMBINATION: CPT

## 2023-07-19 PROCEDURE — 99285 EMERGENCY DEPT VISIT HI MDM: CPT

## 2023-07-19 RX ORDER — LORAZEPAM 0.5 MG/1
0.5 TABLET ORAL EVERY 6 HOURS PRN
Status: DISCONTINUED | OUTPATIENT
Start: 2023-07-19 | End: 2023-07-24 | Stop reason: HOSPADM

## 2023-07-19 RX ORDER — BENZONATATE 100 MG/1
100 CAPSULE ORAL EVERY 4 HOURS PRN
Status: DISCONTINUED | OUTPATIENT
Start: 2023-07-19 | End: 2023-07-24 | Stop reason: HOSPADM

## 2023-07-19 RX ORDER — PANTOPRAZOLE SODIUM 40 MG/1
40 TABLET, DELAYED RELEASE ORAL
Status: DISCONTINUED | OUTPATIENT
Start: 2023-07-20 | End: 2023-07-24 | Stop reason: HOSPADM

## 2023-07-19 RX ORDER — ONDANSETRON 4 MG/1
4 TABLET, ORALLY DISINTEGRATING ORAL EVERY 8 HOURS PRN
Status: DISCONTINUED | OUTPATIENT
Start: 2023-07-19 | End: 2023-07-24 | Stop reason: HOSPADM

## 2023-07-19 RX ORDER — DEXAMETHASONE SODIUM PHOSPHATE 10 MG/ML
8 INJECTION, SOLUTION INTRAMUSCULAR; INTRAVENOUS ONCE
Status: COMPLETED | OUTPATIENT
Start: 2023-07-19 | End: 2023-07-19

## 2023-07-19 RX ORDER — SODIUM CHLORIDE 9 MG/ML
INJECTION, SOLUTION INTRAVENOUS PRN
Status: DISCONTINUED | OUTPATIENT
Start: 2023-07-19 | End: 2023-07-24 | Stop reason: HOSPADM

## 2023-07-19 RX ORDER — SERTRALINE HYDROCHLORIDE 100 MG/1
100 TABLET, FILM COATED ORAL DAILY
Status: DISCONTINUED | OUTPATIENT
Start: 2023-07-20 | End: 2023-07-24 | Stop reason: HOSPADM

## 2023-07-19 RX ORDER — POLYETHYLENE GLYCOL 3350 17 G/17G
17 POWDER, FOR SOLUTION ORAL DAILY PRN
Status: DISCONTINUED | OUTPATIENT
Start: 2023-07-19 | End: 2023-07-24 | Stop reason: HOSPADM

## 2023-07-19 RX ORDER — FERROUS SULFATE 325(65) MG
325 TABLET ORAL
Status: DISCONTINUED | OUTPATIENT
Start: 2023-07-20 | End: 2023-07-24 | Stop reason: HOSPADM

## 2023-07-19 RX ORDER — GUAIFENESIN 600 MG/1
600 TABLET, EXTENDED RELEASE ORAL PRN
Status: DISCONTINUED | OUTPATIENT
Start: 2023-07-19 | End: 2023-07-24 | Stop reason: HOSPADM

## 2023-07-19 RX ORDER — SODIUM CHLORIDE 0.9 % (FLUSH) 0.9 %
5-40 SYRINGE (ML) INJECTION PRN
Status: DISCONTINUED | OUTPATIENT
Start: 2023-07-19 | End: 2023-07-24 | Stop reason: HOSPADM

## 2023-07-19 RX ORDER — ONDANSETRON 4 MG/1
4 TABLET, FILM COATED ORAL 3 TIMES DAILY PRN
Status: DISCONTINUED | OUTPATIENT
Start: 2023-07-19 | End: 2023-07-19 | Stop reason: SDUPTHER

## 2023-07-19 RX ORDER — LEVOTHYROXINE SODIUM 0.1 MG/1
100 TABLET ORAL DAILY
Status: DISCONTINUED | OUTPATIENT
Start: 2023-07-20 | End: 2023-07-24 | Stop reason: HOSPADM

## 2023-07-19 RX ORDER — ACETAMINOPHEN 650 MG/1
650 SUPPOSITORY RECTAL EVERY 6 HOURS PRN
Status: DISCONTINUED | OUTPATIENT
Start: 2023-07-19 | End: 2023-07-24 | Stop reason: HOSPADM

## 2023-07-19 RX ORDER — SODIUM CHLORIDE 0.9 % (FLUSH) 0.9 %
5-40 SYRINGE (ML) INJECTION EVERY 12 HOURS SCHEDULED
Status: DISCONTINUED | OUTPATIENT
Start: 2023-07-19 | End: 2023-07-24 | Stop reason: HOSPADM

## 2023-07-19 RX ORDER — DILTIAZEM HYDROCHLORIDE 120 MG/1
240 CAPSULE, COATED, EXTENDED RELEASE ORAL DAILY
Status: DISCONTINUED | OUTPATIENT
Start: 2023-07-20 | End: 2023-07-24 | Stop reason: HOSPADM

## 2023-07-19 RX ORDER — PIRFENIDONE 267 MG/1
1 TABLET, FILM COATED ORAL 3 TIMES DAILY
Status: DISCONTINUED | OUTPATIENT
Start: 2023-07-19 | End: 2023-07-24 | Stop reason: HOSPADM

## 2023-07-19 RX ORDER — DEXTROSE AND SODIUM CHLORIDE 5; .45 G/100ML; G/100ML
INJECTION, SOLUTION INTRAVENOUS CONTINUOUS
Status: DISCONTINUED | OUTPATIENT
Start: 2023-07-19 | End: 2023-07-20

## 2023-07-19 RX ORDER — ATORVASTATIN CALCIUM 40 MG/1
40 TABLET, FILM COATED ORAL DAILY
Status: DISCONTINUED | OUTPATIENT
Start: 2023-07-20 | End: 2023-07-24 | Stop reason: HOSPADM

## 2023-07-19 RX ORDER — TRAMADOL HYDROCHLORIDE 50 MG/1
50 TABLET ORAL EVERY 6 HOURS PRN
Status: DISCONTINUED | OUTPATIENT
Start: 2023-07-19 | End: 2023-07-24 | Stop reason: HOSPADM

## 2023-07-19 RX ORDER — ENOXAPARIN SODIUM 100 MG/ML
30 INJECTION SUBCUTANEOUS DAILY
Status: DISCONTINUED | OUTPATIENT
Start: 2023-07-20 | End: 2023-07-24 | Stop reason: HOSPADM

## 2023-07-19 RX ORDER — HYDROXYZINE HYDROCHLORIDE 10 MG/1
10 TABLET, FILM COATED ORAL 3 TIMES DAILY PRN
Status: DISCONTINUED | OUTPATIENT
Start: 2023-07-19 | End: 2023-07-24 | Stop reason: HOSPADM

## 2023-07-19 RX ORDER — ONDANSETRON 2 MG/ML
4 INJECTION INTRAMUSCULAR; INTRAVENOUS EVERY 6 HOURS PRN
Status: DISCONTINUED | OUTPATIENT
Start: 2023-07-19 | End: 2023-07-24 | Stop reason: HOSPADM

## 2023-07-19 RX ORDER — IPRATROPIUM BROMIDE AND ALBUTEROL SULFATE 2.5; .5 MG/3ML; MG/3ML
2 SOLUTION RESPIRATORY (INHALATION) ONCE
Status: COMPLETED | OUTPATIENT
Start: 2023-07-19 | End: 2023-07-19

## 2023-07-19 RX ORDER — IPRATROPIUM BROMIDE AND ALBUTEROL SULFATE 2.5; .5 MG/3ML; MG/3ML
1 SOLUTION RESPIRATORY (INHALATION)
Status: DISCONTINUED | OUTPATIENT
Start: 2023-07-19 | End: 2023-07-19

## 2023-07-19 RX ORDER — ACETAMINOPHEN 325 MG/1
650 TABLET ORAL EVERY 6 HOURS PRN
Status: DISCONTINUED | OUTPATIENT
Start: 2023-07-19 | End: 2023-07-24 | Stop reason: HOSPADM

## 2023-07-19 RX ORDER — LANOLIN ALCOHOL/MO/W.PET/CERES
3 CREAM (GRAM) TOPICAL NIGHTLY PRN
Status: DISCONTINUED | OUTPATIENT
Start: 2023-07-19 | End: 2023-07-24 | Stop reason: HOSPADM

## 2023-07-19 RX ORDER — ALBUTEROL SULFATE 2.5 MG/3ML
2.5 SOLUTION RESPIRATORY (INHALATION)
Status: DISCONTINUED | OUTPATIENT
Start: 2023-07-19 | End: 2023-07-24 | Stop reason: HOSPADM

## 2023-07-19 RX ORDER — 0.9 % SODIUM CHLORIDE 0.9 %
250 INTRAVENOUS SOLUTION INTRAVENOUS ONCE
Status: COMPLETED | OUTPATIENT
Start: 2023-07-19 | End: 2023-07-19

## 2023-07-19 RX ORDER — NADOLOL 40 MG/1
20 TABLET ORAL DAILY
Status: DISCONTINUED | OUTPATIENT
Start: 2023-07-20 | End: 2023-07-24 | Stop reason: HOSPADM

## 2023-07-19 RX ORDER — MIRTAZAPINE 15 MG/1
30 TABLET, FILM COATED ORAL NIGHTLY
Status: DISCONTINUED | OUTPATIENT
Start: 2023-07-19 | End: 2023-07-24 | Stop reason: HOSPADM

## 2023-07-19 RX ORDER — POTASSIUM CHLORIDE 750 MG/1
20 TABLET, EXTENDED RELEASE ORAL DAILY
Status: DISCONTINUED | OUTPATIENT
Start: 2023-07-20 | End: 2023-07-24 | Stop reason: HOSPADM

## 2023-07-19 RX ORDER — IPRATROPIUM BROMIDE AND ALBUTEROL SULFATE 2.5; .5 MG/3ML; MG/3ML
1 SOLUTION RESPIRATORY (INHALATION)
Status: DISCONTINUED | OUTPATIENT
Start: 2023-07-20 | End: 2023-07-22

## 2023-07-19 RX ADMIN — MIRTAZAPINE 30 MG: 15 TABLET, FILM COATED ORAL at 21:45

## 2023-07-19 RX ADMIN — DEXTROSE AND SODIUM CHLORIDE: 5; 450 INJECTION, SOLUTION INTRAVENOUS at 23:45

## 2023-07-19 RX ADMIN — WATER 40 MG: 1 INJECTION INTRAMUSCULAR; INTRAVENOUS; SUBCUTANEOUS at 21:45

## 2023-07-19 RX ADMIN — DEXAMETHASONE SODIUM PHOSPHATE 8 MG: 10 INJECTION, SOLUTION INTRAMUSCULAR; INTRAVENOUS at 15:15

## 2023-07-19 RX ADMIN — IPRATROPIUM BROMIDE AND ALBUTEROL SULFATE 2 DOSE: 2.5; .5 SOLUTION RESPIRATORY (INHALATION) at 15:16

## 2023-07-19 RX ADMIN — Medication 10 ML: at 22:00

## 2023-07-19 RX ADMIN — IPRATROPIUM BROMIDE AND ALBUTEROL SULFATE 1 DOSE: 2.5; .5 SOLUTION RESPIRATORY (INHALATION) at 23:33

## 2023-07-19 RX ADMIN — SODIUM CHLORIDE 250 ML: 9 INJECTION, SOLUTION INTRAVENOUS at 16:22

## 2023-07-19 NOTE — ED NOTES
Per Dr. Mckayla Siu, ok to medicate pt prior to starting bipap.      Raquel Garcia RN  07/19/23 2816

## 2023-07-19 NOTE — PLAN OF CARE
Acute on chronic respiratory failure, on BiPAP. Admit to ICU. [facility] Patient with history of COPD, cryptogenic organizing pneumonia, pulmonary fibrosis, on home oxygen 2 L. Presenting with increased shortness of breath for few days significantly worse today. Placed on BiPAP in the ED. Currently stable on BiPAP in ED BiPAP 12/5 with FiO2 50%. Nebulizers and steroids given in the ED. [facility] Chest x-ray showed chronic infiltrates-no fevers. No antibiotics given.

## 2023-07-19 NOTE — TELEPHONE ENCOUNTER
[facility] calling to check status of message. States they will need an actual order to change pressures. RT states that the PEEP is the biggest problem with pt due to her O2 dropping. PEEP is currently at 4, RT suggest it be changed to 8?  Please advise

## 2023-07-19 NOTE — ED NOTES
Assisted patient onto bed pain, oxygen saturation dropped to 65% on 8 LPM. Pt showing distress. Pt placed on BiPap at this time. MD made aware.      Ninfa Hackett RN  07/19/23 3585

## 2023-07-19 NOTE — ED PROVIDER NOTES
EMERGENCY DEPARTMENT ENCOUNTER        Pt Name: Carlos Simon  MRN: [id number]  [facility] 1955  Date of evaluation: 7/19/2023  Provider: Rylee Morgan MD  PCP: Kimberly John MD      CHIEF COMPLAINT       Chief Complaint   Patient presents with    COPD     Chronic COPD exacerbation, increase in SOB starting last night. Pt unable to describe how much oxygen she is using at home, and what her normal SPO2 is. HISTORY OFPRESENT ILLNESS   (Location/Symptom, Timing/Onset, Context/Setting, Quality, Duration, Modifying Factors,Severity)  Note limiting factors. Carlos Simon is a 77 y.o. female presenting today due to concern for worsening shortness of breath since 8 PM last night to the point where she was having trouble breathing through the night ultimately decided to come to the emergency department. She reports that her symptoms started to intermittently worsen over the past couple of days but has been more constant since last night. She has a history of interstitial pulmonary fibrosis along with cryptogenic organizing pneumonia. She normally wears oxygen at baseline. She denies any chest pain or abdominal pain. No headache. No falls or trauma. She was in obvious respiratory distress on arrival and tachypneic and therefore history was somewhat limited but she was able to provide some answers in short sentences. No reported fever. No vomiting. REVIEW OF SYSTEMS    (2-9 systems for level 4, 10 or more for level 5)     Review of Systems   Constitutional:  Positive for fatigue. Negative for fever. Respiratory:  Positive for cough and shortness of breath. Cardiovascular:  Negative for chest pain. Gastrointestinal:  Negative for nausea and vomiting. Neurological:  Negative for syncope and headaches. Psychiatric/Behavioral:  The patient is nervous/anxious. Positives and Pertinent negatives as per HPI.       PASTMEDICAL HISTORY     Past Medical

## 2023-07-19 NOTE — TELEPHONE ENCOUNTER
Admission orders were placed before writer contacted Dr. Mirta Alarcon to inform of 30 day readmission risk.

## 2023-07-19 NOTE — ED NOTES
at bedside states patient is normally on 12 LPM at home, while using two concentrators at the same time. States she usually runs between % on oxygen while at rest, but will drop once any exertion is made. Pt  also states pt is not able to sit up with HOB elevated.  educated that we are attempting to keep pt elevated to help with her breathing.  states that she is unable to tolerate that position and \"We fight the lesser evils\". Pt is laying on left side, curled in fetal position at this time.       Ewa Guzman RN  07/19/23 3951

## 2023-07-19 NOTE — ED NOTES
Verbal report called to Estela Villeda RN at [facility] ICU, all questions addressed at this time.      Chantale Florentino RN  07/19/23 0606

## 2023-07-19 NOTE — H&P
disease) ([address])     Cryptogenic organizing pneumonia ([address])     Depression     Diabetes mellitus ([address])     GERD (gastroesophageal reflux disease)     Hyperlipidemia     Hypertension     On home oxygen therapy     uses O2 3L prn    Rash     Thyroid disease     hypothyroidism     PSHX:  has a past surgical history that includes Cholecystectomy; bronchoscopy (N/A, 6/27/2019); bronchoscopy (6/27/2019); bronchoscopy (6/27/2019); bronchoscopy (6/27/2019); bronchoscopy (07/10/2019); bronchoscopy (N/A, 7/10/2019); Hysterectomy, total abdominal; bronchoscopy (Bilateral, 08/06/2019); bronchoscopy (N/A, 8/6/2019); bronchoscopy (N/A, 12/24/2019); Arm Surgery (Left, 3/2/2020); Colonoscopy (N/A, 8/25/2022); CT GUIDED CHEST TUBE (9/15/2022); and CT GUIDED PLEURAL DRAINAGE W CATH PERC (11/28/2022). Allergies: Allergies   Allergen Reactions    Penicillins Anaphylaxis     Tolerates Meropenem. Cefuroxime      Tolerates Meropenem. Doxycycline Hives    Imitrex [Sumatriptan] Other (See Comments)     Feels like pins and needles    Meperidine     Sulfa Antibiotics Hives    Keflex [Cephalexin] Itching and Rash     Tolerates Meropenem. Tape Merline Lang Tape] Rash     Fam HX:   family history includes Asthma in her sister; Diabetes in her mother; High Blood Pressure in her mother; Other in her father. Soc HX:   Social History     Socioeconomic History    Marital status:      Spouse name: None    Number of children: 7    Years of education: None    Highest education level: None   Tobacco Use    Smoking status: Never    Smokeless tobacco: Never   Vaping Use    Vaping Use: Never used   Substance and Sexual Activity    Alcohol use: No    Drug use: No    Sexual activity: Not Currently     Social Determinants of Health     Transportation Needs: No Transportation Needs    Lack of Transportation (Medical): No    Lack of Transportation (Non-Medical):  No       Medications:   Medications:    Infusions:   PRN Meds:     Labs      CBC:

## 2023-07-19 NOTE — ED NOTES
Verbal handoff report given to [facility], POC transferred at this time. All question answered, patient stable without distress.        Lona Roche RN  07/19/23 1854

## 2023-07-20 ENCOUNTER — APPOINTMENT (OUTPATIENT)
Dept: GENERAL RADIOLOGY | Age: 68
DRG: 189 | End: 2023-07-20
Payer: MEDICARE

## 2023-07-20 PROBLEM — E87.0 HYPERNATREMIA: Status: ACTIVE | Noted: 2023-07-20

## 2023-07-20 LAB
ANION GAP SERPL CALCULATED.3IONS-SCNC: 10 MMOL/L (ref 3–16)
ANISOCYTOSIS BLD QL SMEAR: ABNORMAL
BASE EXCESS BLDA CALC-SCNC: 8.5 MMOL/L (ref -3–3)
BASOPHILS # BLD: 0 K/UL (ref 0–0.2)
BASOPHILS NFR BLD: 0.5 %
BUN SERPL-MCNC: 17 MG/DL (ref 7–20)
CALCIUM SERPL-MCNC: 8.9 MG/DL (ref 8.3–10.6)
CHLORIDE SERPL-SCNC: 99 MMOL/L (ref 99–110)
CO2 BLDA-SCNC: 34.9 MMOL/L
CO2 SERPL-SCNC: 32 MMOL/L (ref 21–32)
COHGB MFR BLDA: 0.7 % (ref 0–1.5)
CREAT SERPL-MCNC: <0.5 MG/DL (ref 0.6–1.2)
DEPRECATED RDW RBC AUTO: 22.8 % (ref 12.4–15.4)
EKG ATRIAL RATE: 101 BPM
EKG DIAGNOSIS: NORMAL
EKG P AXIS: 22 DEGREES
EKG P-R INTERVAL: 128 MS
EKG Q-T INTERVAL: 342 MS
EKG QRS DURATION: 70 MS
EKG QTC CALCULATION (BAZETT): 443 MS
EKG R AXIS: 19 DEGREES
EKG T AXIS: -30 DEGREES
EKG VENTRICULAR RATE: 101 BPM
EOSINOPHIL # BLD: 0 K/UL (ref 0–0.6)
EOSINOPHIL NFR BLD: 0.1 %
GFR SERPLBLD CREATININE-BSD FMLA CKD-EPI: >60 ML/MIN/{1.73_M2}
GLUCOSE BLD-MCNC: 116 MG/DL (ref 70–99)
GLUCOSE BLD-MCNC: 139 MG/DL (ref 70–99)
GLUCOSE BLD-MCNC: 142 MG/DL (ref 70–99)
GLUCOSE BLD-MCNC: 98 MG/DL (ref 70–99)
GLUCOSE SERPL-MCNC: 166 MG/DL (ref 70–99)
HCO3 BLDA-SCNC: 33.4 MMOL/L (ref 21–29)
HCT VFR BLD AUTO: 32.9 % (ref 36–48)
HGB BLD-MCNC: 10.6 G/DL (ref 12–16)
HGB BLDA-MCNC: 11.9 G/DL (ref 12–16)
LYMPHOCYTES # BLD: 0.5 K/UL (ref 1–5.1)
LYMPHOCYTES NFR BLD: 6.3 %
MCH RBC QN AUTO: 26.3 PG (ref 26–34)
MCHC RBC AUTO-ENTMCNC: 32 G/DL (ref 31–36)
MCV RBC AUTO: 82 FL (ref 80–100)
METHGB MFR BLDA: 0 %
MICROCYTES BLD QL SMEAR: ABNORMAL
MONOCYTES # BLD: 0.1 K/UL (ref 0–1.3)
MONOCYTES NFR BLD: 1.8 %
NEUTROPHILS # BLD: 6.9 K/UL (ref 1.7–7.7)
NEUTROPHILS NFR BLD: 91.3 %
O2 THERAPY: ABNORMAL
OVALOCYTES BLD QL SMEAR: ABNORMAL
PCO2 BLDA: 47.4 MMHG (ref 35–45)
PERFORMED ON: ABNORMAL
PERFORMED ON: NORMAL
PH BLDA: 7.47 [PH] (ref 7.35–7.45)
PLATELET # BLD AUTO: 144 K/UL (ref 135–450)
PLATELET BLD QL SMEAR: ADEQUATE
PMV BLD AUTO: 7.3 FL (ref 5–10.5)
PO2 BLDA: 54.5 MMHG (ref 75–108)
POIKILOCYTOSIS BLD QL SMEAR: ABNORMAL
POLYCHROMASIA BLD QL SMEAR: ABNORMAL
POTASSIUM SERPL-SCNC: 4.2 MMOL/L (ref 3.5–5.1)
RBC # BLD AUTO: 4.02 M/UL (ref 4–5.2)
SAO2 % BLDA: 89.8 %
SLIDE REVIEW: ABNORMAL
SODIUM SERPL-SCNC: 141 MMOL/L (ref 136–145)
TSH SERPL DL<=0.005 MIU/L-ACNC: 0.39 UIU/ML (ref 0.27–4.2)
WBC # BLD AUTO: 7.5 K/UL (ref 4–11)

## 2023-07-20 PROCEDURE — 93010 ELECTROCARDIOGRAM REPORT: CPT | Performed by: INTERNAL MEDICINE

## 2023-07-20 PROCEDURE — 94640 AIRWAY INHALATION TREATMENT: CPT

## 2023-07-20 PROCEDURE — 71045 X-RAY EXAM CHEST 1 VIEW: CPT

## 2023-07-20 PROCEDURE — 51798 US URINE CAPACITY MEASURE: CPT

## 2023-07-20 PROCEDURE — 2700000000 HC OXYGEN THERAPY PER DAY

## 2023-07-20 PROCEDURE — 2000000000 HC ICU R&B

## 2023-07-20 PROCEDURE — 94660 CPAP INITIATION&MGMT: CPT

## 2023-07-20 PROCEDURE — 2580000003 HC RX 258: Performed by: INTERNAL MEDICINE

## 2023-07-20 PROCEDURE — 82803 BLOOD GASES ANY COMBINATION: CPT

## 2023-07-20 PROCEDURE — 6360000002 HC RX W HCPCS: Performed by: INTERNAL MEDICINE

## 2023-07-20 PROCEDURE — 94761 N-INVAS EAR/PLS OXIMETRY MLT: CPT

## 2023-07-20 PROCEDURE — 94010 BREATHING CAPACITY TEST: CPT

## 2023-07-20 PROCEDURE — 36415 COLL VENOUS BLD VENIPUNCTURE: CPT

## 2023-07-20 PROCEDURE — 84443 ASSAY THYROID STIM HORMONE: CPT

## 2023-07-20 PROCEDURE — 6370000000 HC RX 637 (ALT 250 FOR IP): Performed by: INTERNAL MEDICINE

## 2023-07-20 PROCEDURE — 99233 SBSQ HOSP IP/OBS HIGH 50: CPT | Performed by: INTERNAL MEDICINE

## 2023-07-20 PROCEDURE — 99291 CRITICAL CARE FIRST HOUR: CPT | Performed by: INTERNAL MEDICINE

## 2023-07-20 PROCEDURE — 5A09357 ASSISTANCE WITH RESPIRATORY VENTILATION, LESS THAN 24 CONSECUTIVE HOURS, CONTINUOUS POSITIVE AIRWAY PRESSURE: ICD-10-PCS | Performed by: INTERNAL MEDICINE

## 2023-07-20 PROCEDURE — 85025 COMPLETE CBC W/AUTO DIFF WBC: CPT

## 2023-07-20 PROCEDURE — 80048 BASIC METABOLIC PNL TOTAL CA: CPT

## 2023-07-20 RX ORDER — DEXTROSE MONOHYDRATE 100 MG/ML
INJECTION, SOLUTION INTRAVENOUS CONTINUOUS PRN
Status: DISCONTINUED | OUTPATIENT
Start: 2023-07-20 | End: 2023-07-24 | Stop reason: HOSPADM

## 2023-07-20 RX ORDER — INSULIN LISPRO 100 [IU]/ML
0-8 INJECTION, SOLUTION INTRAVENOUS; SUBCUTANEOUS
Status: DISCONTINUED | OUTPATIENT
Start: 2023-07-20 | End: 2023-07-24 | Stop reason: HOSPADM

## 2023-07-20 RX ORDER — INSULIN LISPRO 100 [IU]/ML
0-4 INJECTION, SOLUTION INTRAVENOUS; SUBCUTANEOUS NIGHTLY
Status: DISCONTINUED | OUTPATIENT
Start: 2023-07-20 | End: 2023-07-24 | Stop reason: HOSPADM

## 2023-07-20 RX ORDER — LEVOFLOXACIN 500 MG/1
500 TABLET, FILM COATED ORAL DAILY
Status: COMPLETED | OUTPATIENT
Start: 2023-07-20 | End: 2023-07-24

## 2023-07-20 RX ADMIN — PANTOPRAZOLE SODIUM 40 MG: 40 TABLET, DELAYED RELEASE ORAL at 10:45

## 2023-07-20 RX ADMIN — SERTRALINE 100 MG: 100 TABLET, FILM COATED ORAL at 10:44

## 2023-07-20 RX ADMIN — Medication 10 ML: at 20:48

## 2023-07-20 RX ADMIN — MUPIROCIN: 20 OINTMENT TOPICAL at 10:45

## 2023-07-20 RX ADMIN — NADOLOL 20 MG: 40 TABLET ORAL at 10:46

## 2023-07-20 RX ADMIN — LEVOTHYROXINE SODIUM 100 MCG: 100 TABLET ORAL at 10:44

## 2023-07-20 RX ADMIN — Medication 10 ML: at 08:19

## 2023-07-20 RX ADMIN — ENOXAPARIN SODIUM 30 MG: 100 INJECTION SUBCUTANEOUS at 08:19

## 2023-07-20 RX ADMIN — PIRFENIDONE 1 TABLET: 267 TABLET, FILM COATED ORAL at 21:55

## 2023-07-20 RX ADMIN — MUPIROCIN: 20 OINTMENT TOPICAL at 20:49

## 2023-07-20 RX ADMIN — IPRATROPIUM BROMIDE AND ALBUTEROL SULFATE 1 DOSE: 2.5; .5 SOLUTION RESPIRATORY (INHALATION) at 15:12

## 2023-07-20 RX ADMIN — IPRATROPIUM BROMIDE AND ALBUTEROL SULFATE 1 DOSE: 2.5; .5 SOLUTION RESPIRATORY (INHALATION) at 20:02

## 2023-07-20 RX ADMIN — LORAZEPAM 0.5 MG: 0.5 TABLET ORAL at 17:11

## 2023-07-20 RX ADMIN — WATER 40 MG: 1 INJECTION INTRAMUSCULAR; INTRAVENOUS; SUBCUTANEOUS at 03:30

## 2023-07-20 RX ADMIN — FERROUS SULFATE TAB 325 MG (65 MG ELEMENTAL FE) 325 MG: 325 (65 FE) TAB at 10:45

## 2023-07-20 RX ADMIN — LORAZEPAM 0.5 MG: 0.5 TABLET ORAL at 10:44

## 2023-07-20 RX ADMIN — ATORVASTATIN CALCIUM 40 MG: 40 TABLET, FILM COATED ORAL at 10:45

## 2023-07-20 RX ADMIN — MIRTAZAPINE 30 MG: 15 TABLET, FILM COATED ORAL at 20:48

## 2023-07-20 RX ADMIN — IPRATROPIUM BROMIDE AND ALBUTEROL SULFATE 1 DOSE: 2.5; .5 SOLUTION RESPIRATORY (INHALATION) at 10:45

## 2023-07-20 RX ADMIN — HYDROXYZINE HYDROCHLORIDE 10 MG: 10 TABLET ORAL at 20:48

## 2023-07-20 RX ADMIN — LEVOFLOXACIN 500 MG: 500 TABLET, FILM COATED ORAL at 10:45

## 2023-07-20 RX ADMIN — IPRATROPIUM BROMIDE AND ALBUTEROL SULFATE 1 DOSE: 2.5; .5 SOLUTION RESPIRATORY (INHALATION) at 07:33

## 2023-07-20 RX ADMIN — WATER 40 MG: 1 INJECTION INTRAMUSCULAR; INTRAVENOUS; SUBCUTANEOUS at 20:48

## 2023-07-20 RX ADMIN — DILTIAZEM HYDROCHLORIDE 240 MG: 120 CAPSULE, COATED, EXTENDED RELEASE ORAL at 10:45

## 2023-07-20 RX ADMIN — IPRATROPIUM BROMIDE AND ALBUTEROL SULFATE 1 DOSE: 2.5; .5 SOLUTION RESPIRATORY (INHALATION) at 23:12

## 2023-07-20 RX ADMIN — HYDROXYZINE HYDROCHLORIDE 10 MG: 10 TABLET ORAL at 10:45

## 2023-07-20 ASSESSMENT — COPD QUESTIONNAIRES
QUESTION5_HOMEACTIVITIES: 5
QUESTION8_ENERGYLEVEL: 4
QUESTION6_LEAVINGHOUSE: 5
QUESTION7_SLEEPQUALITY: 4
QUESTION1_COUGHFREQUENCY: 2
QUESTION2_CHESTPHLEGM: 1
CAT_TOTALSCORE: 28
QUESTION4_WALKINCLINE: 5
QUESTION3_CHESTTIGHTNESS: 2

## 2023-07-20 ASSESSMENT — PAIN SCALES - GENERAL
PAINLEVEL_OUTOF10: 0
PAINLEVEL_OUTOF10: 0

## 2023-07-20 NOTE — PLAN OF CARE
Problem: Discharge Planning  Goal: Discharge to home or other facility with appropriate resources  Outcome: Progressing     Problem: Skin/Tissue Integrity  Goal: Absence of new skin breakdown  Description: 1. Monitor for areas of redness and/or skin breakdown  2. Assess vascular access sites hourly  3. Every 4-6 hours minimum:  Change oxygen saturation probe site  4. Every 4-6 hours:  If on nasal continuous positive airway pressure, respiratory therapy assess nares and determine need for appliance change or resting period.   Outcome: Progressing     Problem: Safety - Adult  Goal: Free from fall injury  Outcome: Progressing     Problem: ABCDS Injury Assessment  Goal: Absence of physical injury  Outcome: Progressing     Problem: Chronic Conditions and Co-morbidities  Goal: Patient's chronic conditions and co-morbidity symptoms are monitored and maintained or improved  Outcome: Progressing     Problem: Respiratory - Adult  Goal: Achieves optimal ventilation and oxygenation  Outcome: Progressing     Problem: Cardiovascular - Adult  Goal: Maintains optimal cardiac output and hemodynamic stability  Outcome: Progressing  Goal: Absence of cardiac dysrhythmias or at baseline  Outcome: Progressing

## 2023-07-20 NOTE — CARE COORDINATION
Case Management Assessment  Initial Evaluation    Date/Time of Evaluation: 7/20/2023 8:49 AM  Assessment Completed by: Jocelyn Sharpe    If patient is discharged prior to next notation, then this note serves as note for discharge by case management. Patient Name: Johnathon Obando                   YOB: 1955  Diagnosis: Shortness of breath [R06.02]  Hypernatremia [E87.0]  Elevated troponin [R77.8]  Acute on chronic respiratory failure with hypoxia (HCC) [J96.21]  Acute respiratory failure ([address]) [J96.00]                   Date / Time: 7/19/2023  2:40 PM    Patient Admission Status: Inpatient   Readmission Risk (Low < 19, Mod (19-27), High > 27): Readmission Risk Score: 37.5    Current PCP: Barbara Calderon MD  PCP verified by CM? Yes [facility])    Chart Reviewed: Yes      History Provided by: Patient  Patient Orientation: Alert and Oriented    Patient Cognition: Alert    Hospitalization in the last 30 days (Readmission):  Yes    If yes, Readmission Assessment in CM Navigator will be completed. Advance Directives:      Code Status: Full Code   Patient's Primary Decision Maker is: Named in Scanned ACP Document    Primary Decision Maker: Aristides Dugan - Spouse - [phone]    Secondary Decision Maker: Brisa Ferrell Child - [phone]    Discharge Planning:    Patient lives with: Spouse/Significant Other Type of Home: House  Primary Care Giver: Self  Patient Support Systems include: Spouse/Significant Other, Family Members, VALENTINO/Passport   Current Financial resources: Medicaid, Medicare  Current community resources:  Other (Comment) (passport)  Current services prior to admission: Oxygen Therapy, Other (Comment) (Kayley, daughter paid caregiver)            Current DME: Nish , Wheelchair, Oxygen Therapy (Comment), Other (Comment) (rollator)            Type of Home Care services:  Aide Services    ADLS  Prior functional level: Assistance with the following:, Housework, Shopping, Cooking,

## 2023-07-20 NOTE — CONSULTS
Pulmonary & Critical Care Consultation     Patient is being seen at the request of Dr. Sue Jang for a consultation for ILD, ICU care     HISTORY OF PRESENT ILLNESS: This is a 40-year-old female with a history of progressive severe ILD with refractory hypoxemia who was discharged from this hospital just less than 3 weeks ago with similar symptoms of worsening shortness of breath associated with worsening hypoxemia. In the emergency department [facility], she required BiPAP to facilitate oxygenation. PAST MEDICAL HISTORY:  Past Medical History:   Diagnosis Date    A-fib ([address])     Acute cystitis without hematuria     Anxiety     CHF (congestive heart failure) (HCC)     COPD (chronic obstructive pulmonary disease) ([address])     Cryptogenic organizing pneumonia ([address])     Depression     Diabetes mellitus ([address])     GERD (gastroesophageal reflux disease)     Hyperlipidemia     Hypertension     On home oxygen therapy     uses O2 3L prn    Rash     Thyroid disease     hypothyroidism     PAST SURGICAL HISTORY:  Past Surgical History:   Procedure Laterality Date    ARM SURGERY Left 3/2/2020    LEFT ULNAR NERVE DECOMPRESSION AT THE ELBOW performed by Marvin Simon MD at [facility] [address] N/A 6/27/2019    EBUS WF W/ANES.  performed by Miki Auguste MD at [address] [place] [address]  6/27/2019    BRONCHOSCOPY/TRANSBRONCHIAL NEEDLE BIOPSY performed by Miki Auguste MD at [address] [place] [address]  6/27/2019    BRONCHOSCOPY/TRANSBRONCHIAL LUNG BIOPSY performed by Miki Auguste MD at [address] [place] [address]  6/27/2019    BRONCHOSCOPY ALVEOLAR LAVAGE performed by Miki Auguste MD at [address] [place] [address]  07/10/2019    BRONCHOSCOPY N/A 7/10/2019    BRONCHOSCOPY ALVEOLAR LAVAGE performed by Gerardo Blackwood MD at [address] [place] [address] Bilateral 08/06/2019    BRONCHOSCOPY N/A 8/6/2019    BRONCHOSCOPY ALVEOLAR LAVAGE performed by Chandra Allen MD at [address] [place] [address]

## 2023-07-20 NOTE — FLOWSHEET NOTE
07/20/23 1113   Height and Weight   Weight - Scale 102 lb (46.3 kg)     Pt re-weighed at this time. Pt weight with 1 pillow on bed and 1 flat sheet. No sheets or blankets on top of her.

## 2023-07-20 NOTE — ACP (ADVANCE CARE PLANNING)
Advance Care Planning     General Advance Care Planning (ACP) Conversation    Date of Conversation: 7/19/2023  Conducted with: Patient with Decision Making Capacity    Healthcare Decision Maker:    Primary Decision Maker: Aristides Dugan - Spouse - [phone]    Secondary Decision Maker: Brisa Ferrell - Child - [phone]  Click here to complete Healthcare Decision Makers including selection of the Healthcare Decision Maker Relationship (ie \"Primary\"). Today we documented Decision Maker(s) consistent with Legal Next of Kin hierarchy.     Content/Action Overview:  DECLINED ACP Conversation - will revisit periodically  Reviewed DNR/DNI and patient elects Full Code (Attempt Resuscitation)        Length of Voluntary ACP Conversation in minutes:  <16 minutes (Non-Billable)    Jocelyn Sharpe

## 2023-07-21 PROBLEM — E43 SEVERE PROTEIN-CALORIE MALNUTRITION (HCC): Status: ACTIVE | Noted: 2022-12-05

## 2023-07-21 LAB
ANION GAP SERPL CALCULATED.3IONS-SCNC: 9 MMOL/L (ref 3–16)
ANISOCYTOSIS BLD QL SMEAR: ABNORMAL
BASE EXCESS BLDA CALC-SCNC: 6.1 MMOL/L (ref -3–3)
BASOPHILS # BLD: 0 K/UL (ref 0–0.2)
BASOPHILS NFR BLD: 0.4 %
BUN SERPL-MCNC: 20 MG/DL (ref 7–20)
CALCIUM SERPL-MCNC: 9 MG/DL (ref 8.3–10.6)
CHLORIDE SERPL-SCNC: 99 MMOL/L (ref 99–110)
CO2 BLDA-SCNC: 34.9 MMOL/L
CO2 SERPL-SCNC: 33 MMOL/L (ref 21–32)
COHGB MFR BLDA: 0.2 % (ref 0–1.5)
CREAT SERPL-MCNC: <0.5 MG/DL (ref 0.6–1.2)
DEPRECATED RDW RBC AUTO: 22.6 % (ref 12.4–15.4)
EOSINOPHIL # BLD: 0 K/UL (ref 0–0.6)
EOSINOPHIL NFR BLD: 0.2 %
GFR SERPLBLD CREATININE-BSD FMLA CKD-EPI: >60 ML/MIN/{1.73_M2}
GLUCOSE BLD-MCNC: 101 MG/DL (ref 70–99)
GLUCOSE BLD-MCNC: 142 MG/DL (ref 70–99)
GLUCOSE BLD-MCNC: 179 MG/DL (ref 70–99)
GLUCOSE BLD-MCNC: 99 MG/DL (ref 70–99)
GLUCOSE SERPL-MCNC: 196 MG/DL (ref 70–99)
HCO3 BLDA-SCNC: 33 MMOL/L (ref 21–29)
HCT VFR BLD AUTO: 33.4 % (ref 36–48)
HGB BLD-MCNC: 10.5 G/DL (ref 12–16)
HGB BLDA-MCNC: 11.2 G/DL (ref 12–16)
LYMPHOCYTES # BLD: 0.6 K/UL (ref 1–5.1)
LYMPHOCYTES NFR BLD: 6.1 %
MCH RBC QN AUTO: 26.2 PG (ref 26–34)
MCHC RBC AUTO-ENTMCNC: 31.4 G/DL (ref 31–36)
MCV RBC AUTO: 83.5 FL (ref 80–100)
METHGB MFR BLDA: 0.3 %
MICROCYTES BLD QL SMEAR: ABNORMAL
MONOCYTES # BLD: 0.3 K/UL (ref 0–1.3)
MONOCYTES NFR BLD: 3.4 %
NEUTROPHILS # BLD: 8.6 K/UL (ref 1.7–7.7)
NEUTROPHILS NFR BLD: 89.9 %
O2 THERAPY: ABNORMAL
OVALOCYTES BLD QL SMEAR: ABNORMAL
PCO2 BLDA: 59.8 MMHG (ref 35–45)
PERFORMED ON: ABNORMAL
PERFORMED ON: NORMAL
PH BLDA: 7.36 [PH] (ref 7.35–7.45)
PLATELET # BLD AUTO: 157 K/UL (ref 135–450)
PLATELET BLD QL SMEAR: ADEQUATE
PMV BLD AUTO: 7.2 FL (ref 5–10.5)
PO2 BLDA: 348 MMHG (ref 75–108)
POIKILOCYTOSIS BLD QL SMEAR: ABNORMAL
POLYCHROMASIA BLD QL SMEAR: ABNORMAL
POTASSIUM SERPL-SCNC: 3.9 MMOL/L (ref 3.5–5.1)
RBC # BLD AUTO: 4 M/UL (ref 4–5.2)
SAO2 % BLDA: 99.7 %
SLIDE REVIEW: ABNORMAL
SODIUM SERPL-SCNC: 141 MMOL/L (ref 136–145)
WBC # BLD AUTO: 9.5 K/UL (ref 4–11)

## 2023-07-21 PROCEDURE — 94761 N-INVAS EAR/PLS OXIMETRY MLT: CPT

## 2023-07-21 PROCEDURE — 36415 COLL VENOUS BLD VENIPUNCTURE: CPT

## 2023-07-21 PROCEDURE — 99291 CRITICAL CARE FIRST HOUR: CPT | Performed by: INTERNAL MEDICINE

## 2023-07-21 PROCEDURE — 94640 AIRWAY INHALATION TREATMENT: CPT

## 2023-07-21 PROCEDURE — 2580000003 HC RX 258: Performed by: INTERNAL MEDICINE

## 2023-07-21 PROCEDURE — 2060000000 HC ICU INTERMEDIATE R&B

## 2023-07-21 PROCEDURE — 99232 SBSQ HOSP IP/OBS MODERATE 35: CPT | Performed by: INTERNAL MEDICINE

## 2023-07-21 PROCEDURE — 6360000002 HC RX W HCPCS: Performed by: INTERNAL MEDICINE

## 2023-07-21 PROCEDURE — 6370000000 HC RX 637 (ALT 250 FOR IP): Performed by: INTERNAL MEDICINE

## 2023-07-21 PROCEDURE — 82803 BLOOD GASES ANY COMBINATION: CPT

## 2023-07-21 PROCEDURE — 80048 BASIC METABOLIC PNL TOTAL CA: CPT

## 2023-07-21 PROCEDURE — 94660 CPAP INITIATION&MGMT: CPT

## 2023-07-21 PROCEDURE — 85025 COMPLETE CBC W/AUTO DIFF WBC: CPT

## 2023-07-21 PROCEDURE — 2700000000 HC OXYGEN THERAPY PER DAY

## 2023-07-21 RX ADMIN — Medication 10 ML: at 19:42

## 2023-07-21 RX ADMIN — SERTRALINE 100 MG: 100 TABLET, FILM COATED ORAL at 09:51

## 2023-07-21 RX ADMIN — ATORVASTATIN CALCIUM 40 MG: 40 TABLET, FILM COATED ORAL at 09:51

## 2023-07-21 RX ADMIN — MIRTAZAPINE 30 MG: 15 TABLET, FILM COATED ORAL at 21:18

## 2023-07-21 RX ADMIN — IPRATROPIUM BROMIDE AND ALBUTEROL SULFATE 1 DOSE: 2.5; .5 SOLUTION RESPIRATORY (INHALATION) at 11:28

## 2023-07-21 RX ADMIN — LORAZEPAM 0.5 MG: 0.5 TABLET ORAL at 18:16

## 2023-07-21 RX ADMIN — PIRFENIDONE 1 TABLET: 267 TABLET, FILM COATED ORAL at 16:04

## 2023-07-21 RX ADMIN — WATER 40 MG: 1 INJECTION INTRAMUSCULAR; INTRAVENOUS; SUBCUTANEOUS at 09:51

## 2023-07-21 RX ADMIN — HYDROXYZINE HYDROCHLORIDE 10 MG: 10 TABLET ORAL at 21:53

## 2023-07-21 RX ADMIN — PIRFENIDONE 1 TABLET: 267 TABLET, FILM COATED ORAL at 09:52

## 2023-07-21 RX ADMIN — LEVOTHYROXINE SODIUM 100 MCG: 100 TABLET ORAL at 09:54

## 2023-07-21 RX ADMIN — LORAZEPAM 0.5 MG: 0.5 TABLET ORAL at 23:53

## 2023-07-21 RX ADMIN — IPRATROPIUM BROMIDE AND ALBUTEROL SULFATE 1 DOSE: 2.5; .5 SOLUTION RESPIRATORY (INHALATION) at 07:51

## 2023-07-21 RX ADMIN — ENOXAPARIN SODIUM 30 MG: 100 INJECTION SUBCUTANEOUS at 09:51

## 2023-07-21 RX ADMIN — MUPIROCIN: 20 OINTMENT TOPICAL at 09:52

## 2023-07-21 RX ADMIN — MUPIROCIN: 20 OINTMENT TOPICAL at 19:49

## 2023-07-21 RX ADMIN — LEVOFLOXACIN 500 MG: 500 TABLET, FILM COATED ORAL at 09:51

## 2023-07-21 RX ADMIN — DILTIAZEM HYDROCHLORIDE 240 MG: 120 CAPSULE, COATED, EXTENDED RELEASE ORAL at 09:51

## 2023-07-21 RX ADMIN — PIRFENIDONE 1 TABLET: 267 TABLET, FILM COATED ORAL at 21:18

## 2023-07-21 RX ADMIN — FERROUS SULFATE TAB 325 MG (65 MG ELEMENTAL FE) 325 MG: 325 (65 FE) TAB at 09:54

## 2023-07-21 RX ADMIN — IPRATROPIUM BROMIDE AND ALBUTEROL SULFATE 1 DOSE: 2.5; .5 SOLUTION RESPIRATORY (INHALATION) at 23:13

## 2023-07-21 RX ADMIN — IPRATROPIUM BROMIDE AND ALBUTEROL SULFATE 1 DOSE: 2.5; .5 SOLUTION RESPIRATORY (INHALATION) at 20:09

## 2023-07-21 RX ADMIN — PANTOPRAZOLE SODIUM 40 MG: 40 TABLET, DELAYED RELEASE ORAL at 09:55

## 2023-07-21 RX ADMIN — IPRATROPIUM BROMIDE AND ALBUTEROL SULFATE 1 DOSE: 2.5; .5 SOLUTION RESPIRATORY (INHALATION) at 15:00

## 2023-07-21 ASSESSMENT — ENCOUNTER SYMPTOMS
VOMITING: 0
COUGH: 1
SHORTNESS OF BREATH: 1
NAUSEA: 0

## 2023-07-21 NOTE — PLAN OF CARE
Problem: Discharge Planning  Goal: Discharge to home or other facility with appropriate resources  7/20/2023 2133 by Fatimah Parra RN  Outcome: Progressing  7/20/2023 0952 by Katrina Castellanos RN  Outcome: Progressing     Problem: Skin/Tissue Integrity  Goal: Absence of new skin breakdown  Description: 1. Monitor for areas of redness and/or skin breakdown  2. Assess vascular access sites hourly  3. Every 4-6 hours minimum:  Change oxygen saturation probe site  4. Every 4-6 hours:  If on nasal continuous positive airway pressure, respiratory therapy assess nares and determine need for appliance change or resting period.   7/20/2023 2133 by Fatimah Parra RN  Outcome: Progressing  7/20/2023 0952 by Katrina Castellanos RN  Outcome: Progressing     Problem: Safety - Adult  Goal: Free from fall injury  7/20/2023 2133 by Fatimah Parra RN  Outcome: Progressing  7/20/2023 0952 by Katrina Castellanos RN  Outcome: Progressing     Problem: ABCDS Injury Assessment  Goal: Absence of physical injury  7/20/2023 2133 by Fatimah Parra RN  Outcome: Progressing  7/20/2023 0952 by Katrina Castellanos RN  Outcome: Progressing     Problem: Chronic Conditions and Co-morbidities  Goal: Patient's chronic conditions and co-morbidity symptoms are monitored and maintained or improved  7/20/2023 2133 by Fatimah Parra RN  Outcome: Progressing  7/20/2023 0952 by Katrina Castellanos RN  Outcome: Progressing     Problem: Respiratory - Adult  Goal: Achieves optimal ventilation and oxygenation  7/20/2023 2133 by Fatimah Parra RN  Outcome: Progressing  7/20/2023 0952 by Katrina Castellanos RN  Outcome: Progressing     Problem: Cardiovascular - Adult  Goal: Maintains optimal cardiac output and hemodynamic stability  7/20/2023 2133 by Fatimah Parra RN  Outcome: Progressing  7/20/2023 0952 by Katrina Castellanos RN  Outcome: Progressing  Goal: Absence of cardiac dysrhythmias or at baseline  7/20/2023 2133 by Fatimah Parra RN  Outcome:

## 2023-07-22 LAB
ANION GAP SERPL CALCULATED.3IONS-SCNC: 8 MMOL/L (ref 3–16)
ANISOCYTOSIS BLD QL SMEAR: ABNORMAL
BASOPHILS # BLD: 0 K/UL (ref 0–0.2)
BASOPHILS NFR BLD: 0.4 %
BUN SERPL-MCNC: 18 MG/DL (ref 7–20)
CALCIUM SERPL-MCNC: 8.6 MG/DL (ref 8.3–10.6)
CHLORIDE SERPL-SCNC: 101 MMOL/L (ref 99–110)
CO2 SERPL-SCNC: 34 MMOL/L (ref 21–32)
CREAT SERPL-MCNC: <0.5 MG/DL (ref 0.6–1.2)
DACRYOCYTES BLD QL SMEAR: ABNORMAL
DEPRECATED RDW RBC AUTO: 22.3 % (ref 12.4–15.4)
EOSINOPHIL # BLD: 0.2 K/UL (ref 0–0.6)
EOSINOPHIL NFR BLD: 2.7 %
GFR SERPLBLD CREATININE-BSD FMLA CKD-EPI: >60 ML/MIN/{1.73_M2}
GLUCOSE BLD-MCNC: 148 MG/DL (ref 70–99)
GLUCOSE BLD-MCNC: 149 MG/DL (ref 70–99)
GLUCOSE BLD-MCNC: 151 MG/DL (ref 70–99)
GLUCOSE BLD-MCNC: 234 MG/DL (ref 70–99)
GLUCOSE SERPL-MCNC: 182 MG/DL (ref 70–99)
HCT VFR BLD AUTO: 31 % (ref 36–48)
HGB BLD-MCNC: 10 G/DL (ref 12–16)
LYMPHOCYTES # BLD: 2.1 K/UL (ref 1–5.1)
LYMPHOCYTES NFR BLD: 23.4 %
MCH RBC QN AUTO: 26.4 PG (ref 26–34)
MCHC RBC AUTO-ENTMCNC: 32.1 G/DL (ref 31–36)
MCV RBC AUTO: 82.3 FL (ref 80–100)
MONOCYTES # BLD: 0.7 K/UL (ref 0–1.3)
MONOCYTES NFR BLD: 8.2 %
NEUTROPHILS # BLD: 5.9 K/UL (ref 1.7–7.7)
NEUTROPHILS NFR BLD: 65.3 %
OVALOCYTES BLD QL SMEAR: ABNORMAL
PERFORMED ON: ABNORMAL
PLATELET # BLD AUTO: 139 K/UL (ref 135–450)
PLATELET BLD QL SMEAR: ADEQUATE
PMV BLD AUTO: 7.4 FL (ref 5–10.5)
POTASSIUM SERPL-SCNC: 3 MMOL/L (ref 3.5–5.1)
RBC # BLD AUTO: 3.77 M/UL (ref 4–5.2)
SLIDE REVIEW: ABNORMAL
SODIUM SERPL-SCNC: 143 MMOL/L (ref 136–145)
WBC # BLD AUTO: 9.1 K/UL (ref 4–11)

## 2023-07-22 PROCEDURE — 94640 AIRWAY INHALATION TREATMENT: CPT

## 2023-07-22 PROCEDURE — 99233 SBSQ HOSP IP/OBS HIGH 50: CPT | Performed by: INTERNAL MEDICINE

## 2023-07-22 PROCEDURE — 6370000000 HC RX 637 (ALT 250 FOR IP): Performed by: INTERNAL MEDICINE

## 2023-07-22 PROCEDURE — 94761 N-INVAS EAR/PLS OXIMETRY MLT: CPT

## 2023-07-22 PROCEDURE — 97530 THERAPEUTIC ACTIVITIES: CPT

## 2023-07-22 PROCEDURE — 2580000003 HC RX 258: Performed by: INTERNAL MEDICINE

## 2023-07-22 PROCEDURE — 2060000000 HC ICU INTERMEDIATE R&B

## 2023-07-22 PROCEDURE — 80048 BASIC METABOLIC PNL TOTAL CA: CPT

## 2023-07-22 PROCEDURE — 6360000002 HC RX W HCPCS: Performed by: INTERNAL MEDICINE

## 2023-07-22 PROCEDURE — 85025 COMPLETE CBC W/AUTO DIFF WBC: CPT

## 2023-07-22 PROCEDURE — 2700000000 HC OXYGEN THERAPY PER DAY

## 2023-07-22 PROCEDURE — 6370000000 HC RX 637 (ALT 250 FOR IP): Performed by: SURGERY

## 2023-07-22 PROCEDURE — 36415 COLL VENOUS BLD VENIPUNCTURE: CPT

## 2023-07-22 PROCEDURE — 97162 PT EVAL MOD COMPLEX 30 MIN: CPT

## 2023-07-22 PROCEDURE — 97166 OT EVAL MOD COMPLEX 45 MIN: CPT

## 2023-07-22 RX ORDER — IPRATROPIUM BROMIDE AND ALBUTEROL SULFATE 2.5; .5 MG/3ML; MG/3ML
1 SOLUTION RESPIRATORY (INHALATION)
Status: DISCONTINUED | OUTPATIENT
Start: 2023-07-22 | End: 2023-07-24 | Stop reason: HOSPADM

## 2023-07-22 RX ORDER — POTASSIUM CHLORIDE 7.45 MG/ML
10 INJECTION INTRAVENOUS PRN
Status: DISCONTINUED | OUTPATIENT
Start: 2023-07-22 | End: 2023-07-24 | Stop reason: HOSPADM

## 2023-07-22 RX ORDER — POTASSIUM CHLORIDE 750 MG/1
40 TABLET, EXTENDED RELEASE ORAL ONCE
Status: COMPLETED | OUTPATIENT
Start: 2023-07-22 | End: 2023-07-22

## 2023-07-22 RX ORDER — POTASSIUM CHLORIDE 750 MG/1
40 TABLET, EXTENDED RELEASE ORAL PRN
Status: DISCONTINUED | OUTPATIENT
Start: 2023-07-22 | End: 2023-07-24 | Stop reason: HOSPADM

## 2023-07-22 RX ADMIN — WATER 40 MG: 1 INJECTION INTRAMUSCULAR; INTRAVENOUS; SUBCUTANEOUS at 09:55

## 2023-07-22 RX ADMIN — MIRTAZAPINE 30 MG: 15 TABLET, FILM COATED ORAL at 20:16

## 2023-07-22 RX ADMIN — POTASSIUM CHLORIDE 40 MEQ: 750 TABLET, EXTENDED RELEASE ORAL at 12:16

## 2023-07-22 RX ADMIN — LEVOTHYROXINE SODIUM 100 MCG: 100 TABLET ORAL at 09:50

## 2023-07-22 RX ADMIN — PANTOPRAZOLE SODIUM 40 MG: 40 TABLET, DELAYED RELEASE ORAL at 09:50

## 2023-07-22 RX ADMIN — FERROUS SULFATE TAB 325 MG (65 MG ELEMENTAL FE) 325 MG: 325 (65 FE) TAB at 09:50

## 2023-07-22 RX ADMIN — Medication 10 ML: at 20:30

## 2023-07-22 RX ADMIN — IPRATROPIUM BROMIDE AND ALBUTEROL SULFATE 1 DOSE: 2.5; .5 SOLUTION RESPIRATORY (INHALATION) at 11:47

## 2023-07-22 RX ADMIN — POTASSIUM CHLORIDE 20 MEQ: 750 TABLET, EXTENDED RELEASE ORAL at 09:54

## 2023-07-22 RX ADMIN — PIRFENIDONE 1 TABLET: 267 TABLET, FILM COATED ORAL at 09:51

## 2023-07-22 RX ADMIN — PIRFENIDONE 1 TABLET: 267 TABLET, FILM COATED ORAL at 16:57

## 2023-07-22 RX ADMIN — ATORVASTATIN CALCIUM 40 MG: 40 TABLET, FILM COATED ORAL at 09:51

## 2023-07-22 RX ADMIN — IPRATROPIUM BROMIDE AND ALBUTEROL SULFATE 1 DOSE: 2.5; .5 SOLUTION RESPIRATORY (INHALATION) at 19:54

## 2023-07-22 RX ADMIN — ENOXAPARIN SODIUM 30 MG: 100 INJECTION SUBCUTANEOUS at 09:51

## 2023-07-22 RX ADMIN — MUPIROCIN: 20 OINTMENT TOPICAL at 09:50

## 2023-07-22 RX ADMIN — INSULIN LISPRO 2 UNITS: 100 INJECTION, SOLUTION INTRAVENOUS; SUBCUTANEOUS at 17:03

## 2023-07-22 RX ADMIN — Medication 3 MG: at 22:41

## 2023-07-22 RX ADMIN — PIRFENIDONE 1 TABLET: 267 TABLET, FILM COATED ORAL at 20:17

## 2023-07-22 RX ADMIN — DILTIAZEM HYDROCHLORIDE 240 MG: 120 CAPSULE, COATED, EXTENDED RELEASE ORAL at 09:52

## 2023-07-22 RX ADMIN — IPRATROPIUM BROMIDE AND ALBUTEROL SULFATE 1 DOSE: 2.5; .5 SOLUTION RESPIRATORY (INHALATION) at 08:14

## 2023-07-22 RX ADMIN — NADOLOL 20 MG: 40 TABLET ORAL at 12:13

## 2023-07-22 RX ADMIN — MUPIROCIN: 20 OINTMENT TOPICAL at 20:16

## 2023-07-22 RX ADMIN — SERTRALINE 100 MG: 100 TABLET, FILM COATED ORAL at 09:50

## 2023-07-22 RX ADMIN — IPRATROPIUM BROMIDE AND ALBUTEROL SULFATE 1 DOSE: 2.5; .5 SOLUTION RESPIRATORY (INHALATION) at 15:50

## 2023-07-22 RX ADMIN — ONDANSETRON 4 MG: 4 TABLET, ORALLY DISINTEGRATING ORAL at 12:13

## 2023-07-22 RX ADMIN — LORAZEPAM 0.5 MG: 0.5 TABLET ORAL at 12:20

## 2023-07-22 RX ADMIN — BENZONATATE 100 MG: 100 CAPSULE ORAL at 20:21

## 2023-07-22 RX ADMIN — LORAZEPAM 0.5 MG: 0.5 TABLET ORAL at 22:41

## 2023-07-22 RX ADMIN — Medication 10 ML: at 09:51

## 2023-07-22 RX ADMIN — LEVOFLOXACIN 500 MG: 500 TABLET, FILM COATED ORAL at 09:50

## 2023-07-22 NOTE — CARE COORDINATION
CM following for resumption of Passport services. Can transfer out of ICU to PCU today. Possible DC 1-2 days. Plans to return home w/ family and resume services. +[facility] home O2.

## 2023-07-22 NOTE — PLAN OF CARE
Problem: Discharge Planning  Goal: Discharge to home or other facility with appropriate resources  Outcome: Progressing  Flowsheets (Taken 7/21/2023 2106)  Discharge to home or other facility with appropriate resources: Identify barriers to discharge with patient and caregiver     Problem: Skin/Tissue Integrity  Goal: Absence of new skin breakdown  Description: 1. Monitor for areas of redness and/or skin breakdown  2. Assess vascular access sites hourly  3. Every 4-6 hours minimum:  Change oxygen saturation probe site  4. Every 4-6 hours:  If on nasal continuous positive airway pressure, respiratory therapy assess nares and determine need for appliance change or resting period.   Outcome: Progressing     Problem: Safety - Adult  Goal: Free from fall injury  Outcome: Progressing     Problem: ABCDS Injury Assessment  Goal: Absence of physical injury  Outcome: Progressing     Problem: Chronic Conditions and Co-morbidities  Goal: Patient's chronic conditions and co-morbidity symptoms are monitored and maintained or improved  Outcome: Progressing  Flowsheets (Taken 7/21/2023 2106)  Care Plan - Patient's Chronic Conditions and Co-Morbidity Symptoms are Monitored and Maintained or Improved:   Monitor and assess patient's chronic conditions and comorbid symptoms for stability, deterioration, or improvement   Collaborate with multidisciplinary team to address chronic and comorbid conditions and prevent exacerbation or deterioration   Update acute care plan with appropriate goals if chronic or comorbid symptoms are exacerbated and prevent overall improvement and discharge     Problem: Respiratory - Adult  Goal: Achieves optimal ventilation and oxygenation  Outcome: Progressing     Problem: Cardiovascular - Adult  Goal: Maintains optimal cardiac output and hemodynamic stability  Outcome: Progressing  Flowsheets (Taken 7/21/2023 2106)  Maintains optimal cardiac output and hemodynamic stability:   Monitor blood pressure and

## 2023-07-23 PROBLEM — R53.1 WEAKNESS: Status: ACTIVE | Noted: 2023-07-23

## 2023-07-23 LAB
ANION GAP SERPL CALCULATED.3IONS-SCNC: 10 MMOL/L (ref 3–16)
BUN SERPL-MCNC: 15 MG/DL (ref 7–20)
CALCIUM SERPL-MCNC: 8.9 MG/DL (ref 8.3–10.6)
CHLORIDE SERPL-SCNC: 98 MMOL/L (ref 99–110)
CO2 SERPL-SCNC: 31 MMOL/L (ref 21–32)
CREAT SERPL-MCNC: <0.5 MG/DL (ref 0.6–1.2)
GFR SERPLBLD CREATININE-BSD FMLA CKD-EPI: >60 ML/MIN/{1.73_M2}
GLUCOSE BLD-MCNC: 102 MG/DL (ref 70–99)
GLUCOSE BLD-MCNC: 156 MG/DL (ref 70–99)
GLUCOSE BLD-MCNC: 163 MG/DL (ref 70–99)
GLUCOSE BLD-MCNC: 193 MG/DL (ref 70–99)
GLUCOSE SERPL-MCNC: 154 MG/DL (ref 70–99)
PERFORMED ON: ABNORMAL
POTASSIUM SERPL-SCNC: 4.3 MMOL/L (ref 3.5–5.1)
SODIUM SERPL-SCNC: 139 MMOL/L (ref 136–145)

## 2023-07-23 PROCEDURE — 2700000000 HC OXYGEN THERAPY PER DAY

## 2023-07-23 PROCEDURE — 99232 SBSQ HOSP IP/OBS MODERATE 35: CPT | Performed by: INTERNAL MEDICINE

## 2023-07-23 PROCEDURE — 80048 BASIC METABOLIC PNL TOTAL CA: CPT

## 2023-07-23 PROCEDURE — 6370000000 HC RX 637 (ALT 250 FOR IP): Performed by: INTERNAL MEDICINE

## 2023-07-23 PROCEDURE — 2580000003 HC RX 258: Performed by: INTERNAL MEDICINE

## 2023-07-23 PROCEDURE — 6360000002 HC RX W HCPCS: Performed by: INTERNAL MEDICINE

## 2023-07-23 PROCEDURE — 2060000000 HC ICU INTERMEDIATE R&B

## 2023-07-23 PROCEDURE — 36415 COLL VENOUS BLD VENIPUNCTURE: CPT

## 2023-07-23 PROCEDURE — 99233 SBSQ HOSP IP/OBS HIGH 50: CPT | Performed by: INTERNAL MEDICINE

## 2023-07-23 PROCEDURE — 94761 N-INVAS EAR/PLS OXIMETRY MLT: CPT

## 2023-07-23 PROCEDURE — 94640 AIRWAY INHALATION TREATMENT: CPT

## 2023-07-23 RX ORDER — PREDNISONE 20 MG/1
40 TABLET ORAL DAILY
Status: DISCONTINUED | OUTPATIENT
Start: 2023-07-24 | End: 2023-07-24 | Stop reason: HOSPADM

## 2023-07-23 RX ADMIN — POTASSIUM CHLORIDE 20 MEQ: 750 TABLET, EXTENDED RELEASE ORAL at 09:06

## 2023-07-23 RX ADMIN — DILTIAZEM HYDROCHLORIDE 240 MG: 120 CAPSULE, COATED, EXTENDED RELEASE ORAL at 09:07

## 2023-07-23 RX ADMIN — LEVOTHYROXINE SODIUM 100 MCG: 100 TABLET ORAL at 07:01

## 2023-07-23 RX ADMIN — Medication 3 MG: at 23:39

## 2023-07-23 RX ADMIN — IPRATROPIUM BROMIDE AND ALBUTEROL SULFATE 1 DOSE: 2.5; .5 SOLUTION RESPIRATORY (INHALATION) at 07:52

## 2023-07-23 RX ADMIN — PIRFENIDONE 1 TABLET: 267 TABLET, FILM COATED ORAL at 09:08

## 2023-07-23 RX ADMIN — IPRATROPIUM BROMIDE AND ALBUTEROL SULFATE 1 DOSE: 2.5; .5 SOLUTION RESPIRATORY (INHALATION) at 19:56

## 2023-07-23 RX ADMIN — Medication 10 ML: at 09:08

## 2023-07-23 RX ADMIN — MUPIROCIN: 20 OINTMENT TOPICAL at 09:07

## 2023-07-23 RX ADMIN — MIRTAZAPINE 30 MG: 15 TABLET, FILM COATED ORAL at 20:55

## 2023-07-23 RX ADMIN — IPRATROPIUM BROMIDE AND ALBUTEROL SULFATE 1 DOSE: 2.5; .5 SOLUTION RESPIRATORY (INHALATION) at 11:33

## 2023-07-23 RX ADMIN — PIRFENIDONE 1 TABLET: 267 TABLET, FILM COATED ORAL at 20:55

## 2023-07-23 RX ADMIN — IPRATROPIUM BROMIDE AND ALBUTEROL SULFATE 1 DOSE: 2.5; .5 SOLUTION RESPIRATORY (INHALATION) at 15:26

## 2023-07-23 RX ADMIN — NADOLOL 20 MG: 40 TABLET ORAL at 09:14

## 2023-07-23 RX ADMIN — MUPIROCIN: 20 OINTMENT TOPICAL at 20:55

## 2023-07-23 RX ADMIN — SERTRALINE 100 MG: 100 TABLET, FILM COATED ORAL at 09:07

## 2023-07-23 RX ADMIN — FERROUS SULFATE TAB 325 MG (65 MG ELEMENTAL FE) 325 MG: 325 (65 FE) TAB at 07:01

## 2023-07-23 RX ADMIN — LORAZEPAM 0.5 MG: 0.5 TABLET ORAL at 23:40

## 2023-07-23 RX ADMIN — ENOXAPARIN SODIUM 30 MG: 100 INJECTION SUBCUTANEOUS at 09:06

## 2023-07-23 RX ADMIN — WATER 40 MG: 1 INJECTION INTRAMUSCULAR; INTRAVENOUS; SUBCUTANEOUS at 09:07

## 2023-07-23 RX ADMIN — LEVOFLOXACIN 500 MG: 500 TABLET, FILM COATED ORAL at 09:07

## 2023-07-23 RX ADMIN — Medication 10 ML: at 20:54

## 2023-07-23 RX ADMIN — ATORVASTATIN CALCIUM 40 MG: 40 TABLET, FILM COATED ORAL at 09:07

## 2023-07-23 RX ADMIN — PANTOPRAZOLE SODIUM 40 MG: 40 TABLET, DELAYED RELEASE ORAL at 07:01

## 2023-07-23 RX ADMIN — LORAZEPAM 0.5 MG: 0.5 TABLET ORAL at 16:21

## 2023-07-23 RX ADMIN — BENZONATATE 100 MG: 100 CAPSULE ORAL at 23:39

## 2023-07-23 RX ADMIN — PIRFENIDONE 1 TABLET: 267 TABLET, FILM COATED ORAL at 17:08

## 2023-07-24 VITALS
DIASTOLIC BLOOD PRESSURE: 122 MMHG | SYSTOLIC BLOOD PRESSURE: 163 MMHG | WEIGHT: 111.02 LBS | RESPIRATION RATE: 19 BRPM | HEART RATE: 80 BPM | HEIGHT: 63 IN | TEMPERATURE: 97.7 F | BODY MASS INDEX: 19.67 KG/M2 | OXYGEN SATURATION: 98 %

## 2023-07-24 PROBLEM — J84.10 PULMONARY FIBROSIS (HCC): Status: ACTIVE | Noted: 2023-07-24

## 2023-07-24 LAB
ANION GAP SERPL CALCULATED.3IONS-SCNC: 5 MMOL/L (ref 3–16)
BUN SERPL-MCNC: 16 MG/DL (ref 7–20)
CALCIUM SERPL-MCNC: 8.8 MG/DL (ref 8.3–10.6)
CHLORIDE SERPL-SCNC: 101 MMOL/L (ref 99–110)
CO2 SERPL-SCNC: 37 MMOL/L (ref 21–32)
CREAT SERPL-MCNC: <0.5 MG/DL (ref 0.6–1.2)
GFR SERPLBLD CREATININE-BSD FMLA CKD-EPI: >60 ML/MIN/{1.73_M2}
GLUCOSE BLD-MCNC: 106 MG/DL (ref 70–99)
GLUCOSE BLD-MCNC: 134 MG/DL (ref 70–99)
GLUCOSE SERPL-MCNC: 108 MG/DL (ref 70–99)
PERFORMED ON: ABNORMAL
PERFORMED ON: ABNORMAL
POTASSIUM SERPL-SCNC: 4.7 MMOL/L (ref 3.5–5.1)
SODIUM SERPL-SCNC: 143 MMOL/L (ref 136–145)

## 2023-07-24 PROCEDURE — 97530 THERAPEUTIC ACTIVITIES: CPT

## 2023-07-24 PROCEDURE — 6370000000 HC RX 637 (ALT 250 FOR IP): Performed by: INTERNAL MEDICINE

## 2023-07-24 PROCEDURE — 6360000002 HC RX W HCPCS: Performed by: INTERNAL MEDICINE

## 2023-07-24 PROCEDURE — 80048 BASIC METABOLIC PNL TOTAL CA: CPT

## 2023-07-24 PROCEDURE — 36415 COLL VENOUS BLD VENIPUNCTURE: CPT

## 2023-07-24 PROCEDURE — 94640 AIRWAY INHALATION TREATMENT: CPT

## 2023-07-24 PROCEDURE — 99238 HOSP IP/OBS DSCHRG MGMT 30/<: CPT | Performed by: INTERNAL MEDICINE

## 2023-07-24 PROCEDURE — 99233 SBSQ HOSP IP/OBS HIGH 50: CPT | Performed by: INTERNAL MEDICINE

## 2023-07-24 PROCEDURE — 2580000003 HC RX 258: Performed by: INTERNAL MEDICINE

## 2023-07-24 PROCEDURE — 2700000000 HC OXYGEN THERAPY PER DAY

## 2023-07-24 PROCEDURE — 94761 N-INVAS EAR/PLS OXIMETRY MLT: CPT

## 2023-07-24 RX ORDER — TRAMADOL HYDROCHLORIDE 50 MG/1
50 TABLET ORAL 2 TIMES DAILY
Qty: 1 TABLET | Refills: 0
Start: 2023-07-24 | End: 2023-07-25

## 2023-07-24 RX ORDER — PREDNISONE 10 MG/1
TABLET ORAL
Qty: 60 TABLET | Refills: 0 | Status: ON HOLD | OUTPATIENT
Start: 2023-07-24 | End: 2023-07-28 | Stop reason: HOSPADM

## 2023-07-24 RX ADMIN — LEVOTHYROXINE SODIUM 100 MCG: 100 TABLET ORAL at 07:28

## 2023-07-24 RX ADMIN — PIRFENIDONE 1 TABLET: 267 TABLET, FILM COATED ORAL at 14:34

## 2023-07-24 RX ADMIN — IPRATROPIUM BROMIDE AND ALBUTEROL SULFATE 1 DOSE: 2.5; .5 SOLUTION RESPIRATORY (INHALATION) at 11:33

## 2023-07-24 RX ADMIN — Medication 10 ML: at 08:44

## 2023-07-24 RX ADMIN — ATORVASTATIN CALCIUM 40 MG: 40 TABLET, FILM COATED ORAL at 08:45

## 2023-07-24 RX ADMIN — DILTIAZEM HYDROCHLORIDE 240 MG: 120 CAPSULE, COATED, EXTENDED RELEASE ORAL at 08:45

## 2023-07-24 RX ADMIN — SERTRALINE 100 MG: 100 TABLET, FILM COATED ORAL at 08:44

## 2023-07-24 RX ADMIN — IPRATROPIUM BROMIDE AND ALBUTEROL SULFATE 1 DOSE: 2.5; .5 SOLUTION RESPIRATORY (INHALATION) at 08:07

## 2023-07-24 RX ADMIN — PREDNISONE 40 MG: 20 TABLET ORAL at 08:45

## 2023-07-24 RX ADMIN — PIRFENIDONE 1 TABLET: 267 TABLET, FILM COATED ORAL at 08:44

## 2023-07-24 RX ADMIN — FERROUS SULFATE TAB 325 MG (65 MG ELEMENTAL FE) 325 MG: 325 (65 FE) TAB at 08:45

## 2023-07-24 RX ADMIN — ENOXAPARIN SODIUM 30 MG: 100 INJECTION SUBCUTANEOUS at 08:44

## 2023-07-24 RX ADMIN — LEVOFLOXACIN 500 MG: 500 TABLET, FILM COATED ORAL at 08:44

## 2023-07-24 RX ADMIN — POTASSIUM CHLORIDE 20 MEQ: 750 TABLET, EXTENDED RELEASE ORAL at 08:45

## 2023-07-24 RX ADMIN — MUPIROCIN: 20 OINTMENT TOPICAL at 08:44

## 2023-07-24 RX ADMIN — PANTOPRAZOLE SODIUM 40 MG: 40 TABLET, DELAYED RELEASE ORAL at 07:28

## 2023-07-24 RX ADMIN — IPRATROPIUM BROMIDE AND ALBUTEROL SULFATE 1 DOSE: 2.5; .5 SOLUTION RESPIRATORY (INHALATION) at 15:30

## 2023-07-24 NOTE — PLAN OF CARE
Problem: Discharge Planning  Goal: Discharge to home or other facility with appropriate resources  Outcome: Completed     Problem: Skin/Tissue Integrity  Goal: Absence of new skin breakdown  Description: 1. Monitor for areas of redness and/or skin breakdown  2. Assess vascular access sites hourly  3. Every 4-6 hours minimum:  Change oxygen saturation probe site  4. Every 4-6 hours:  If on nasal continuous positive airway pressure, respiratory therapy assess nares and determine need for appliance change or resting period.   Outcome: Completed     Problem: Safety - Adult  Goal: Free from fall injury  Outcome: Completed     Problem: ABCDS Injury Assessment  Goal: Absence of physical injury  Outcome: Completed     Problem: Chronic Conditions and Co-morbidities  Goal: Patient's chronic conditions and co-morbidity symptoms are monitored and maintained or improved  Outcome: Completed     Problem: Cardiovascular - Adult  Goal: Maintains optimal cardiac output and hemodynamic stability  Outcome: Completed     Problem: Cardiovascular - Adult  Goal: Absence of cardiac dysrhythmias or at baseline  Outcome: Completed     Problem: Nutrition Deficit:  Goal: Optimize nutritional status  Outcome: Completed

## 2023-07-24 NOTE — CARE COORDINATION
Pt discharged from ICU prior to CM follow up. Lives w/sp. Plans to return home. No new HHC needs. +[facility] home O2 (has portable tank. Discharge timeout done with [facility], [facility].. All discharge needs and concerns addressed.

## 2023-07-24 NOTE — DISCHARGE SUMMARY
tablets by mouth daily for 10 days, THEN 2 tablets daily for 10 days, THEN 1 tablet daily for 10 days. Start taking on: July 24, 2023  What changed: See the new instructions. CONTINUE taking these medications      albuterol (2.5 MG/3ML) 0.083% nebulizer solution  Commonly known as: PROVENTIL  INHALE THE CONTENTS OF 1 VIAL VIA NEBULIZER EVERY 6 HOURS AS NEEDED FOR WHEEZING     atorvastatin 40 MG tablet  Commonly known as: LIPITOR  TAKE 1 TABLET EVERY DAY     benzonatate 200 MG capsule  Commonly known as: TESSALON  TAKE 1 CAPSULE BY MOUTH 3 TIMES A DAY AS NEEDED FOR COUGH     CLARITIN-D 24 HOUR PO     dilTIAZem 240 MG extended release capsule  Commonly known as: CARDIZEM CD  TAKE 1 CAPSULE BY MOUTH EVERY DAY     ferrous sulfate 325 (65 Fe) MG tablet  Commonly known as: IRON 325  Take 1 tablet by mouth daily (with breakfast)     furosemide 40 MG tablet  Commonly known as: LASIX     guaiFENesin 600 MG extended release tablet  Commonly known as: MUCINEX     hydrOXYzine HCl 10 MG tablet  Commonly known as: ATARAX  Take 1 tablet by mouth 3 times daily as needed for Itching     levothyroxine 100 MCG tablet  Commonly known as: SYNTHROID  Take 1 tablet by mouth Daily     LORazepam 0.5 MG tablet  Commonly known as: ATIVAN  Take 1 tablet by mouth every 6 hours as needed for Anxiety for up to 180 days. Max Daily Amount: 2 mg     melatonin 3 MG Tabs tablet     mirtazapine 30 MG tablet  Commonly known as: REMERON  Take 1 tablet by mouth nightly     nadolol 20 MG tablet  Commonly known as: CORGARD  Take 1 tablet by mouth daily     omeprazole 40 MG delayed release capsule  Commonly known as: PRILOSEC     potassium chloride 20 MEQ extended release tablet  Commonly known as: KLOR-CON M  Take 1 tablet by mouth daily     sertraline 100 MG tablet  Commonly known as: ZOLOFT  TAKE 2 TABLETS EVERY DAY     traMADol 50 MG tablet  Commonly known as: Ultram  Take 1 tablet by mouth 2 times daily for 1 day.  Take lowest dose possible

## 2023-07-24 NOTE — PLAN OF CARE
HEART FAILURE CARE PLAN:    Comorbidities Reviewed: Yes   Patient has a past medical history of A-fib ([address]), Acute cystitis without hematuria, Anxiety, CHF (congestive heart failure) ([address]), COPD (chronic obstructive pulmonary disease) ([address]), Cryptogenic organizing pneumonia ([address]), Depression, Diabetes mellitus ([address]), GERD (gastroesophageal reflux disease), Hyperlipidemia, Hypertension, On home oxygen therapy, Rash, and Thyroid disease. ECHOCARDIOGRAM Reviewed: Yes   Patient's Ejection Fraction (EF) is greater than 40%    Weights Reviewed: Yes   Admission weight: 120 lb (54.4 kg)   Wt Readings from Last 3 Encounters:   07/24/23 111 lb 0.4 oz (50.4 kg)   06/30/23 120 lb 14.4 oz (54.8 kg)   06/10/23 124 lb (56.2 kg)     Intake & Output Reviewed: Yes     Intake/Output Summary (Last 24 hours) at 7/24/2023 1027  Last data filed at 7/24/2023 2743  Gross per 24 hour   Intake 775 ml   Output 1100 ml   Net -325 ml     Medications Reviewed: Yes   SCHEDULED HOSPITAL MEDICATIONS:   predniSONE  40 mg Oral Daily    ipratropium 0.5 mg-albuterol 2.5 mg  1 Dose Inhalation 4x daily    mupirocin   Each Nostril BID    insulin lispro  0-8 Units SubCUTAneous TID WC    insulin lispro  0-4 Units SubCUTAneous Nightly    atorvastatin  40 mg Oral Daily    dilTIAZem  240 mg Oral Daily    ferrous sulfate  325 mg Oral Daily with breakfast    levothyroxine  100 mcg Oral Daily    mirtazapine  30 mg Oral Nightly    nadolol  20 mg Oral Daily    pantoprazole  40 mg Oral QAM AC    Pirfenidone  1 tablet Oral TID    sertraline  100 mg Oral Daily    potassium chloride  20 mEq Oral Daily    sodium chloride flush  5-40 mL IntraVENous 2 times per day    enoxaparin  30 mg SubCUTAneous Daily     ACE/ARB/ARNI is REQUIRED for EF </= 72% SYSTOLIC FAILURE:   ACE[de-identified] N/A  ARB[de-identified] N/A  ARNI[de-identified] N/A    Evidenced-Based Beta Blocker is REQUIRED for EF </= 64% SYSTOLIC FAILURE:   [de-identified]  nadolol    Diuretics:  [de-identified] N/A    Diet Reviewed: Yes   ADULT DIET; Regular;  Low Sodium (2

## 2023-07-25 ENCOUNTER — CARE COORDINATION (OUTPATIENT)
Dept: CASE MANAGEMENT | Age: 68
End: 2023-07-25

## 2023-07-25 RX ORDER — DILTIAZEM HYDROCHLORIDE 240 MG/1
CAPSULE, COATED, EXTENDED RELEASE ORAL
Qty: 90 CAPSULE | Refills: 1 | Status: SHIPPED | OUTPATIENT
Start: 2023-07-25

## 2023-07-25 RX ORDER — OMEPRAZOLE 40 MG/1
CAPSULE, DELAYED RELEASE ORAL
Qty: 90 CAPSULE | Refills: 1 | Status: SHIPPED | OUTPATIENT
Start: 2023-07-25

## 2023-07-25 NOTE — CARE COORDINATION
[facility] Care Transitions Initial Follow Up Call    Call within 2 business days of discharge: Yes      Patient: Shahana Rebolledo Patient : 1955   MRN: [id number]  Reason for Admission: 5 day readmission -> acute on chronic hypoxic resp failure, pulm fibrosis, cryptogenic organizing PNA, PAF-not on AC, HTN essential, hypothyroidism, chronic dCHF, chronic anemia, HLD, depression, anxiety, hx DM no meds (A1c 5.8%) -> home no services, [facility] 18  Discharge Date: 23 RARS: Readmission Risk Score: 38.6      Last Discharge [address] [facility]       Date Complaint Diagnosis Description Type Department Provider    23 COPD Acute on chronic respiratory failure with hypoxia ([address]) . .. ED to Hosp-Admission (Discharged) (ADMIT) Tano Samaniego MD; Nora Bautista. .. CTN attempted follow-up outreach to patient. Message left including CTN contact information.      Follow Up  Future Appointments   Date Time Provider [address]   2023  2:15 PM Sunitha Limon MD [facility] Remy Arevalo, RN  Care Transition Nurse  [phone] mobile

## 2023-07-26 ENCOUNTER — CARE COORDINATION (OUTPATIENT)
Dept: CASE MANAGEMENT | Age: 68
End: 2023-07-26

## 2023-07-26 PROBLEM — R77.8 ELEVATED TROPONIN: Status: RESOLVED | Noted: 2023-06-26 | Resolved: 2023-07-26

## 2023-07-26 PROBLEM — R79.89 ELEVATED TROPONIN: Status: RESOLVED | Noted: 2023-06-26 | Resolved: 2023-07-26

## 2023-07-26 NOTE — CARE COORDINATION
[facility] Care Transitions Initial Follow Up Call    Call within 2 business days of discharge: Yes      Patient: Che Horton Patient : 1955   MRN: [id number]  Reason for Admission: 5 day readmission -> acute on chronic hypoxic resp failure, pulm fibrosis, cryptogenic organizing PNA, PAF-not on AC, HTN essential, hypothyroidism, chronic dCHF, chronic anemia, HLD, depression, anxiety, hx DM no meds (A1c 5.8%) -> home no services, [facility] 18  Discharge Date: 23 RARS: Readmission Risk Score: 38.6      Last Discharge 969 [facility],[address]       Date Complaint Diagnosis Description Type Department Provider    23 COPD Acute on chronic respiratory failure with hypoxia ([address]) . .. ED to Hosp-Admission (Discharged) (ADMIT) Divya Rosales MD; Luisito Isbell. .. CTN attempted follow-up outreach to patient. Message left including CTN contact information. No further CTN outreach scheduled at this time.      Follow Up  Future Appointments   Date Time Provider [address]   2023  2:15 PM Malaika Blair MD [facility] Remy CENTENO [facility]     Joaquin Peters, RN  Care Transition Nurse  [phone] mobile

## 2023-07-27 ENCOUNTER — APPOINTMENT (OUTPATIENT)
Dept: CT IMAGING | Age: 68
DRG: 189 | End: 2023-07-27
Payer: MEDICARE

## 2023-07-27 ENCOUNTER — HOSPITAL ENCOUNTER (INPATIENT)
Age: 68
LOS: 1 days | Discharge: HOME | DRG: 189 | End: 2023-07-28
Attending: STUDENT IN AN ORGANIZED HEALTH CARE EDUCATION/TRAINING PROGRAM | Admitting: INTERNAL MEDICINE
Payer: MEDICARE

## 2023-07-27 ENCOUNTER — APPOINTMENT (OUTPATIENT)
Dept: GENERAL RADIOLOGY | Age: 68
DRG: 189 | End: 2023-07-27
Payer: MEDICARE

## 2023-07-27 ENCOUNTER — FOLLOWUP TELEPHONE ENCOUNTER (OUTPATIENT)
Dept: ADMINISTRATIVE | Age: 68
End: 2023-07-27

## 2023-07-27 ENCOUNTER — TELEPHONE (OUTPATIENT)
Dept: OTHER | Facility: CLINIC | Age: 68
End: 2023-07-27

## 2023-07-27 DIAGNOSIS — R77.8 ELEVATED TROPONIN: ICD-10-CM

## 2023-07-27 DIAGNOSIS — J96.21 ACUTE ON CHRONIC RESPIRATORY FAILURE WITH HYPOXIA (HCC): Primary | ICD-10-CM

## 2023-07-27 LAB
ALBUMIN SERPL-MCNC: 3.9 G/DL (ref 3.4–5)
ALBUMIN/GLOB SERPL: 1.6 {RATIO} (ref 1.1–2.2)
ALP SERPL-CCNC: 77 U/L (ref 40–129)
ALT SERPL-CCNC: 41 U/L (ref 10–40)
ANION GAP SERPL CALCULATED.3IONS-SCNC: 13 MMOL/L (ref 3–16)
AST SERPL-CCNC: 37 U/L (ref 15–37)
BASE EXCESS BLDA CALC-SCNC: 7.4 MMOL/L (ref -3–3)
BASE EXCESS BLDV CALC-SCNC: 8.7 MMOL/L (ref -3–3)
BASOPHILS # BLD: 0 K/UL (ref 0–0.2)
BASOPHILS NFR BLD: 0 %
BILIRUB SERPL-MCNC: 0.4 MG/DL (ref 0–1)
BUN SERPL-MCNC: 23 MG/DL (ref 7–20)
CALCIUM SERPL-MCNC: 8.8 MG/DL (ref 8.3–10.6)
CHLORIDE SERPL-SCNC: 97 MMOL/L (ref 99–110)
CO2 BLDA-SCNC: 34.8 MMOL/L
CO2 BLDV-SCNC: 38 MMOL/L
CO2 SERPL-SCNC: 31 MMOL/L (ref 21–32)
COHGB MFR BLDA: 1.2 % (ref 0–1.5)
COHGB MFR BLDV: 1.9 % (ref 0–1.5)
CREAT SERPL-MCNC: 0.7 MG/DL (ref 0.6–1.2)
DEPRECATED RDW RBC AUTO: 21.9 % (ref 12.4–15.4)
EKG ATRIAL RATE: 348 BPM
EKG DIAGNOSIS: NORMAL
EKG P AXIS: 26 DEGREES
EKG Q-T INTERVAL: 376 MS
EKG QRS DURATION: 78 MS
EKG QTC CALCULATION (BAZETT): 452 MS
EKG R AXIS: 59 DEGREES
EKG T AXIS: 24 DEGREES
EKG VENTRICULAR RATE: 87 BPM
EOSINOPHIL # BLD: 0.3 K/UL (ref 0–0.6)
EOSINOPHIL NFR BLD: 2 %
GFR SERPLBLD CREATININE-BSD FMLA CKD-EPI: >60 ML/MIN/{1.73_M2}
GLUCOSE BLD-MCNC: 336 MG/DL (ref 70–99)
GLUCOSE BLD-MCNC: 93 MG/DL (ref 70–99)
GLUCOSE SERPL-MCNC: 127 MG/DL (ref 70–99)
HCO3 BLDA-SCNC: 33.2 MMOL/L (ref 21–29)
HCO3 BLDV-SCNC: 35.8 MMOL/L (ref 23–29)
HCT VFR BLD AUTO: 36.2 % (ref 36–48)
HGB BLD-MCNC: 11.3 G/DL (ref 12–16)
HGB BLDA-MCNC: 12 G/DL (ref 12–16)
LYMPHOCYTES # BLD: 3.3 K/UL (ref 1–5.1)
LYMPHOCYTES NFR BLD: 19 %
MCH RBC QN AUTO: 25.9 PG (ref 26–34)
MCHC RBC AUTO-ENTMCNC: 31.1 G/DL (ref 31–36)
MCV RBC AUTO: 83.3 FL (ref 80–100)
METAMYELOCYTES NFR BLD MANUAL: 1 %
METHGB MFR BLDA: 0.3 %
METHGB MFR BLDV: 0.3 %
MONOCYTES # BLD: 1.5 K/UL (ref 0–1.3)
MONOCYTES NFR BLD: 10 %
MYELOCYTES NFR BLD MANUAL: 4 %
NEUTROPHILS # BLD: 9.8 K/UL (ref 1.7–7.7)
NEUTROPHILS NFR BLD: 58 %
NEUTS BAND NFR BLD MANUAL: 3 % (ref 0–7)
O2 THERAPY: ABNORMAL
O2 THERAPY: ABNORMAL
PCO2 BLDA: 52.2 MMHG (ref 35–45)
PCO2 BLDV: 61.5 MMHG (ref 40–50)
PERFORMED ON: ABNORMAL
PERFORMED ON: NORMAL
PH BLDA: 7.42 [PH] (ref 7.35–7.45)
PH BLDV: 7.38 [PH] (ref 7.35–7.45)
PLATELET # BLD AUTO: 196 K/UL (ref 135–450)
PLATELET BLD QL SMEAR: ADEQUATE
PMV BLD AUTO: 8.3 FL (ref 5–10.5)
PO2 BLDA: 39.8 MMHG (ref 75–108)
PO2 BLDV: 23.7 MMHG (ref 25–40)
POTASSIUM SERPL-SCNC: 4 MMOL/L (ref 3.5–5.1)
PROT SERPL-MCNC: 6.4 G/DL (ref 6.4–8.2)
RBC # BLD AUTO: 4.35 M/UL (ref 4–5.2)
SAO2 % BLDA: 74.9 %
SAO2 % BLDV: 39 %
SLIDE REVIEW: ABNORMAL
SODIUM SERPL-SCNC: 141 MMOL/L (ref 136–145)
TROPONIN, HIGH SENSITIVITY: 20 NG/L (ref 0–14)
TROPONIN, HIGH SENSITIVITY: 25 NG/L (ref 0–14)
TROPONIN, HIGH SENSITIVITY: 28 NG/L (ref 0–14)
VARIANT LYMPHS NFR BLD MANUAL: 3 % (ref 0–6)
WBC # BLD AUTO: 14.9 K/UL (ref 4–11)

## 2023-07-27 PROCEDURE — 6360000002 HC RX W HCPCS: Performed by: INTERNAL MEDICINE

## 2023-07-27 PROCEDURE — 84484 ASSAY OF TROPONIN QUANT: CPT

## 2023-07-27 PROCEDURE — 99285 EMERGENCY DEPT VISIT HI MDM: CPT

## 2023-07-27 PROCEDURE — 73080 X-RAY EXAM OF ELBOW: CPT

## 2023-07-27 PROCEDURE — 80053 COMPREHEN METABOLIC PANEL: CPT

## 2023-07-27 PROCEDURE — 36415 COLL VENOUS BLD VENIPUNCTURE: CPT

## 2023-07-27 PROCEDURE — 99223 1ST HOSP IP/OBS HIGH 75: CPT

## 2023-07-27 PROCEDURE — 70450 CT HEAD/BRAIN W/O DYE: CPT

## 2023-07-27 PROCEDURE — 6370000000 HC RX 637 (ALT 250 FOR IP): Performed by: INTERNAL MEDICINE

## 2023-07-27 PROCEDURE — 82803 BLOOD GASES ANY COMBINATION: CPT

## 2023-07-27 PROCEDURE — 94640 AIRWAY INHALATION TREATMENT: CPT

## 2023-07-27 PROCEDURE — 71045 X-RAY EXAM CHEST 1 VIEW: CPT

## 2023-07-27 PROCEDURE — 2060000000 HC ICU INTERMEDIATE R&B

## 2023-07-27 PROCEDURE — 2580000003 HC RX 258: Performed by: INTERNAL MEDICINE

## 2023-07-27 PROCEDURE — 85025 COMPLETE CBC W/AUTO DIFF WBC: CPT

## 2023-07-27 PROCEDURE — 93010 ELECTROCARDIOGRAM REPORT: CPT | Performed by: INTERNAL MEDICINE

## 2023-07-27 PROCEDURE — 94761 N-INVAS EAR/PLS OXIMETRY MLT: CPT

## 2023-07-27 PROCEDURE — 73560 X-RAY EXAM OF KNEE 1 OR 2: CPT

## 2023-07-27 PROCEDURE — 6370000000 HC RX 637 (ALT 250 FOR IP): Performed by: STUDENT IN AN ORGANIZED HEALTH CARE EDUCATION/TRAINING PROGRAM

## 2023-07-27 PROCEDURE — 2700000000 HC OXYGEN THERAPY PER DAY

## 2023-07-27 PROCEDURE — 72125 CT NECK SPINE W/O DYE: CPT

## 2023-07-27 PROCEDURE — 93005 ELECTROCARDIOGRAM TRACING: CPT | Performed by: STUDENT IN AN ORGANIZED HEALTH CARE EDUCATION/TRAINING PROGRAM

## 2023-07-27 RX ORDER — POLYETHYLENE GLYCOL 3350 17 G/17G
17 POWDER, FOR SOLUTION ORAL DAILY PRN
Status: DISCONTINUED | OUTPATIENT
Start: 2023-07-27 | End: 2023-07-28 | Stop reason: HOSPADM

## 2023-07-27 RX ORDER — ONDANSETRON 4 MG/1
4 TABLET, ORALLY DISINTEGRATING ORAL EVERY 8 HOURS PRN
Status: DISCONTINUED | OUTPATIENT
Start: 2023-07-27 | End: 2023-07-28 | Stop reason: HOSPADM

## 2023-07-27 RX ORDER — LEVOTHYROXINE SODIUM 0.1 MG/1
100 TABLET ORAL DAILY
Status: DISCONTINUED | OUTPATIENT
Start: 2023-07-27 | End: 2023-07-28 | Stop reason: HOSPADM

## 2023-07-27 RX ORDER — NADOLOL 40 MG/1
20 TABLET ORAL DAILY
Status: DISCONTINUED | OUTPATIENT
Start: 2023-07-27 | End: 2023-07-28 | Stop reason: HOSPADM

## 2023-07-27 RX ORDER — ACETAMINOPHEN 650 MG/1
650 SUPPOSITORY RECTAL EVERY 6 HOURS PRN
Status: DISCONTINUED | OUTPATIENT
Start: 2023-07-27 | End: 2023-07-28 | Stop reason: HOSPADM

## 2023-07-27 RX ORDER — CETIRIZINE HYDROCHLORIDE, PSEUDOEPHEDRINE HYDROCHLORIDE 5; 120 MG/1; MG/1
1 TABLET, FILM COATED, EXTENDED RELEASE ORAL DAILY PRN
Status: DISCONTINUED | OUTPATIENT
Start: 2023-07-27 | End: 2023-07-28 | Stop reason: HOSPADM

## 2023-07-27 RX ORDER — FUROSEMIDE 40 MG/1
40 TABLET ORAL DAILY
Status: DISCONTINUED | OUTPATIENT
Start: 2023-07-27 | End: 2023-07-28 | Stop reason: HOSPADM

## 2023-07-27 RX ORDER — ALBUTEROL SULFATE 2.5 MG/3ML
2.5 SOLUTION RESPIRATORY (INHALATION)
Status: DISCONTINUED | OUTPATIENT
Start: 2023-07-27 | End: 2023-07-28 | Stop reason: HOSPADM

## 2023-07-27 RX ORDER — ALBUTEROL SULFATE 2.5 MG/3ML
2.5 SOLUTION RESPIRATORY (INHALATION)
Status: DISCONTINUED | OUTPATIENT
Start: 2023-07-27 | End: 2023-07-28

## 2023-07-27 RX ORDER — BENZONATATE 100 MG/1
200 CAPSULE ORAL 3 TIMES DAILY
Status: DISCONTINUED | OUTPATIENT
Start: 2023-07-27 | End: 2023-07-28 | Stop reason: HOSPADM

## 2023-07-27 RX ORDER — POTASSIUM CHLORIDE 20 MEQ/1
20 TABLET, EXTENDED RELEASE ORAL DAILY
Status: DISCONTINUED | OUTPATIENT
Start: 2023-07-27 | End: 2023-07-28 | Stop reason: HOSPADM

## 2023-07-27 RX ORDER — ENOXAPARIN SODIUM 100 MG/ML
30 INJECTION SUBCUTANEOUS DAILY
Status: DISCONTINUED | OUTPATIENT
Start: 2023-07-27 | End: 2023-07-28

## 2023-07-27 RX ORDER — PREDNISONE 20 MG/1
40 TABLET ORAL DAILY
Status: DISCONTINUED | OUTPATIENT
Start: 2023-07-29 | End: 2023-07-28

## 2023-07-27 RX ORDER — ACETAMINOPHEN 500 MG
1000 TABLET ORAL
Status: COMPLETED | OUTPATIENT
Start: 2023-07-27 | End: 2023-07-27

## 2023-07-27 RX ORDER — GUAIFENESIN 600 MG/1
600 TABLET, EXTENDED RELEASE ORAL 2 TIMES DAILY PRN
Status: DISCONTINUED | OUTPATIENT
Start: 2023-07-27 | End: 2023-07-28 | Stop reason: HOSPADM

## 2023-07-27 RX ORDER — IPRATROPIUM BROMIDE AND ALBUTEROL SULFATE 2.5; .5 MG/3ML; MG/3ML
1 SOLUTION RESPIRATORY (INHALATION)
Status: DISCONTINUED | OUTPATIENT
Start: 2023-07-27 | End: 2023-07-28

## 2023-07-27 RX ORDER — LORAZEPAM 0.5 MG/1
0.5 TABLET ORAL EVERY 6 HOURS PRN
Status: DISCONTINUED | OUTPATIENT
Start: 2023-07-27 | End: 2023-07-28 | Stop reason: HOSPADM

## 2023-07-27 RX ORDER — ACETAMINOPHEN 325 MG/1
650 TABLET ORAL EVERY 6 HOURS PRN
Status: DISCONTINUED | OUTPATIENT
Start: 2023-07-27 | End: 2023-07-28 | Stop reason: HOSPADM

## 2023-07-27 RX ORDER — SODIUM CHLORIDE 0.9 % (FLUSH) 0.9 %
5-40 SYRINGE (ML) INJECTION EVERY 12 HOURS SCHEDULED
Status: DISCONTINUED | OUTPATIENT
Start: 2023-07-27 | End: 2023-07-28 | Stop reason: HOSPADM

## 2023-07-27 RX ORDER — ATORVASTATIN CALCIUM 40 MG/1
40 TABLET, FILM COATED ORAL DAILY
Status: DISCONTINUED | OUTPATIENT
Start: 2023-07-27 | End: 2023-07-28 | Stop reason: HOSPADM

## 2023-07-27 RX ORDER — SERTRALINE HYDROCHLORIDE 100 MG/1
200 TABLET, FILM COATED ORAL DAILY
Status: DISCONTINUED | OUTPATIENT
Start: 2023-07-28 | End: 2023-07-28 | Stop reason: HOSPADM

## 2023-07-27 RX ORDER — HYDROXYZINE HYDROCHLORIDE 10 MG/1
10 TABLET, FILM COATED ORAL 3 TIMES DAILY PRN
Status: DISCONTINUED | OUTPATIENT
Start: 2023-07-27 | End: 2023-07-28 | Stop reason: HOSPADM

## 2023-07-27 RX ORDER — SODIUM CHLORIDE 0.9 % (FLUSH) 0.9 %
5-40 SYRINGE (ML) INJECTION PRN
Status: DISCONTINUED | OUTPATIENT
Start: 2023-07-27 | End: 2023-07-28 | Stop reason: HOSPADM

## 2023-07-27 RX ORDER — SODIUM CHLORIDE 9 MG/ML
INJECTION, SOLUTION INTRAVENOUS PRN
Status: DISCONTINUED | OUTPATIENT
Start: 2023-07-27 | End: 2023-07-28 | Stop reason: HOSPADM

## 2023-07-27 RX ORDER — DILTIAZEM HYDROCHLORIDE 240 MG/1
240 CAPSULE, COATED, EXTENDED RELEASE ORAL DAILY
Status: DISCONTINUED | OUTPATIENT
Start: 2023-07-27 | End: 2023-07-28 | Stop reason: HOSPADM

## 2023-07-27 RX ORDER — LANOLIN ALCOHOL/MO/W.PET/CERES
3 CREAM (GRAM) TOPICAL NIGHTLY PRN
Status: DISCONTINUED | OUTPATIENT
Start: 2023-07-27 | End: 2023-07-28 | Stop reason: HOSPADM

## 2023-07-27 RX ORDER — PIRFENIDONE 267 MG/1
1 TABLET, FILM COATED ORAL 3 TIMES DAILY
Status: DISCONTINUED | OUTPATIENT
Start: 2023-07-27 | End: 2023-07-28 | Stop reason: HOSPADM

## 2023-07-27 RX ORDER — INSULIN LISPRO 100 [IU]/ML
0-8 INJECTION, SOLUTION INTRAVENOUS; SUBCUTANEOUS
Status: DISCONTINUED | OUTPATIENT
Start: 2023-07-27 | End: 2023-07-28 | Stop reason: HOSPADM

## 2023-07-27 RX ORDER — ONDANSETRON 2 MG/ML
4 INJECTION INTRAMUSCULAR; INTRAVENOUS EVERY 6 HOURS PRN
Status: DISCONTINUED | OUTPATIENT
Start: 2023-07-27 | End: 2023-07-28 | Stop reason: HOSPADM

## 2023-07-27 RX ORDER — PANTOPRAZOLE SODIUM 40 MG/1
40 TABLET, DELAYED RELEASE ORAL
Status: DISCONTINUED | OUTPATIENT
Start: 2023-07-28 | End: 2023-07-28 | Stop reason: HOSPADM

## 2023-07-27 RX ORDER — FERROUS SULFATE 325(65) MG
325 TABLET ORAL
Status: DISCONTINUED | OUTPATIENT
Start: 2023-07-28 | End: 2023-07-28 | Stop reason: HOSPADM

## 2023-07-27 RX ORDER — MIRTAZAPINE 30 MG/1
30 TABLET, FILM COATED ORAL NIGHTLY
Status: DISCONTINUED | OUTPATIENT
Start: 2023-07-27 | End: 2023-07-28 | Stop reason: HOSPADM

## 2023-07-27 RX ORDER — INSULIN LISPRO 100 [IU]/ML
0-4 INJECTION, SOLUTION INTRAVENOUS; SUBCUTANEOUS NIGHTLY
Status: DISCONTINUED | OUTPATIENT
Start: 2023-07-27 | End: 2023-07-28 | Stop reason: HOSPADM

## 2023-07-27 RX ADMIN — ACETAMINOPHEN 1000 MG: 500 TABLET ORAL at 10:30

## 2023-07-27 RX ADMIN — Medication 10 ML: at 21:01

## 2023-07-27 RX ADMIN — ATORVASTATIN CALCIUM 40 MG: 40 TABLET, FILM COATED ORAL at 18:42

## 2023-07-27 RX ADMIN — BENZONATATE 200 MG: 100 CAPSULE ORAL at 21:01

## 2023-07-27 RX ADMIN — FUROSEMIDE 40 MG: 40 TABLET ORAL at 18:42

## 2023-07-27 RX ADMIN — NADOLOL 20 MG: 40 TABLET ORAL at 18:41

## 2023-07-27 RX ADMIN — PIRFENIDONE 1 TABLET: 267 TABLET, FILM COATED ORAL at 21:01

## 2023-07-27 RX ADMIN — ALBUTEROL SULFATE 2.5 MG: 2.5 SOLUTION RESPIRATORY (INHALATION) at 20:02

## 2023-07-27 RX ADMIN — WATER 40 MG: 1 INJECTION INTRAMUSCULAR; INTRAVENOUS; SUBCUTANEOUS at 18:47

## 2023-07-27 RX ADMIN — MIRTAZAPINE 30 MG: 30 TABLET, FILM COATED ORAL at 21:01

## 2023-07-27 RX ADMIN — ENOXAPARIN SODIUM 30 MG: 100 INJECTION SUBCUTANEOUS at 18:42

## 2023-07-27 RX ADMIN — DILTIAZEM HYDROCHLORIDE 240 MG: 240 CAPSULE, COATED, EXTENDED RELEASE ORAL at 18:42

## 2023-07-27 RX ADMIN — POTASSIUM CHLORIDE 20 MEQ: 1500 TABLET, EXTENDED RELEASE ORAL at 18:41

## 2023-07-27 RX ADMIN — IPRATROPIUM BROMIDE AND ALBUTEROL SULFATE 1 DOSE: .5; 2.5 SOLUTION RESPIRATORY (INHALATION) at 23:13

## 2023-07-27 ASSESSMENT — PAIN - FUNCTIONAL ASSESSMENT
PAIN_FUNCTIONAL_ASSESSMENT: NONE - DENIES PAIN
PAIN_FUNCTIONAL_ASSESSMENT: 0-10
PAIN_FUNCTIONAL_ASSESSMENT: NONE - DENIES PAIN

## 2023-07-27 ASSESSMENT — PAIN SCALES - GENERAL: PAINLEVEL_OUTOF10: 0

## 2023-07-27 NOTE — ED NOTES
Attempted to put the patient on high flow O 2 nasal cannula a 12 lpm.  Patient could not tolerate, nor could be coached with the cannula. Patient states \"I feel like I am drowning\". MD notified and wanted to try venturi mask at 40%. The oxygen saturation dropped to 85% and the patient complained of trouble breathing. We could not get the saturation up with 50% and switched to a simple mask. Pulse oximetry reading would not rise above 87% and patient continued to complain of dyspnea. Patient placed on simple mask at 10 with the same result.   Patient is now on 12 Lpm and pulse oximetry is now 100%     Zach Diaz  07/27/23 [id number]

## 2023-07-27 NOTE — TELEPHONE ENCOUNTER
Writer contacted Dr. Susy Butcher to inform of 30 day readmission risk. Dr. Susy Butcher informed writer there is no decision on disposition at this time. Patient's work up is still in process.      Call Back: If you need to call back to inform of disposition you can contact me at [phone]

## 2023-07-27 NOTE — ED NOTES
Pt noted with small with superficial skin tear to R elbow and L knee. No active bleeding or drng noted. Pt with c/o's. R shoulder pain. No obvious contusions, erythema, edema or other abnormalities noted. RUE circ checks WNL. ROM limited r/t c/o's pain. Denies further c/o's. Cardiac monitor applied, O2 applied as pee home use.  Pt A & O x 3 and without s/s distress or discomfort     Jillian Salazar RN  07/27/23 5544

## 2023-07-27 NOTE — PROGRESS NOTES
Bedside report and transfer of care given to Meena Wilkerson. Pt currently resting in bed with the call light within reach. Pt denies any other care needs at this time. Pt stable at this time. Home dose of Pirfenidone sent down to pharmacy to verify.     Renée Narvaez RN

## 2023-07-27 NOTE — TELEPHONE ENCOUNTER
Heart Failure Follow-up Call:     1st Attempt on 7/25 by Care Transition nurse Melvi Reese    2nd Attempt on 7/26 by Care Transition nurse Melvi Reese    3rd Attempt on 7/27 and patient is in the ED being evaluated.      Electronically signed by HARLEY Romeo, RN  on 7/27/2023 at 10:25 AM

## 2023-07-27 NOTE — PROGRESS NOTES
Consult has been called to Dr. Shayy Prakash on 7/27/2023. Spoke with Zach.  5:43 PM    Ivan Nath RN  7/27/2023

## 2023-07-27 NOTE — ED NOTES
Pt to ambulance via cot without incident or c/o's.  Report called to [facility] PSYCHIATRY RN @ [facility] in SBAR format     Brianna Short RN  07/27/23 9964

## 2023-07-27 NOTE — PROGRESS NOTES
4 Eyes Skin Assessment     NAME:  Keira Obando  YOB: 1955  MEDICAL RECORD NUMBER:  [id number]    The patient is being assessed for  Admission    I agree that at least one RN has performed a thorough Head to Toe Skin Assessment on the patient. ALL assessment sites listed below have been assessed. Areas assessed by both nurses:    Head, Face, Ears, Shoulders, Back, Chest, Arms, Elbows, Hands, Sacrum. Buttock, Coccyx, Ischium, Legs. Feet and Heels, and Under Medical Devices       Skin tear to R shin from fall. MASD to sacrum. Scattered bruising/abrasions. Does the Patient have a Wound? Yes wound(s) were present on assessment.  LDA wound assessment was Initiated and completed by RN       Amos Prevention initiated by RN: Yes  Wound Care Orders initiated by RN: No    Pressure Injury (Stage 3,4, Unstageable, DTI, NWPT, and Complex wounds) if present, place Wound referral order by RN under : No    New Ostomies, if present place, Ostomy referral order under : No     Nurse 1 eSignature: Electronically signed by Christine Smith RN on 7/27/23 at 6:38 PM EDT    **SHARE this note so that the co-signing nurse can place an eSignature**    Nurse 2 eSignature: {Esignature:[id number]}

## 2023-07-28 VITALS
BODY MASS INDEX: 21.8 KG/M2 | HEIGHT: 63 IN | RESPIRATION RATE: 22 BRPM | DIASTOLIC BLOOD PRESSURE: 67 MMHG | SYSTOLIC BLOOD PRESSURE: 110 MMHG | HEART RATE: 75 BPM | TEMPERATURE: 98.4 F | WEIGHT: 123.06 LBS | OXYGEN SATURATION: 89 %

## 2023-07-28 PROBLEM — M25.511 ACUTE PAIN OF RIGHT SHOULDER: Status: ACTIVE | Noted: 2023-07-28

## 2023-07-28 LAB
ANION GAP SERPL CALCULATED.3IONS-SCNC: 12 MMOL/L (ref 3–16)
BASE EXCESS BLDA CALC-SCNC: 3.5 MMOL/L (ref -3–3)
BASOPHILS # BLD: 0 K/UL (ref 0–0.2)
BASOPHILS NFR BLD: 0 %
BUN SERPL-MCNC: 15 MG/DL (ref 7–20)
CALCIUM SERPL-MCNC: 8 MG/DL (ref 8.3–10.6)
CHLORIDE SERPL-SCNC: 98 MMOL/L (ref 99–110)
CO2 BLDA-SCNC: 33.2 MMOL/L
CO2 SERPL-SCNC: 34 MMOL/L (ref 21–32)
COHGB MFR BLDA: 0.3 % (ref 0–1.5)
CREAT SERPL-MCNC: <0.5 MG/DL (ref 0.6–1.2)
DEPRECATED RDW RBC AUTO: 21.3 % (ref 12.4–15.4)
EOSINOPHIL # BLD: 0 K/UL (ref 0–0.6)
EOSINOPHIL NFR BLD: 0.1 %
GFR SERPLBLD CREATININE-BSD FMLA CKD-EPI: >60 ML/MIN/{1.73_M2}
GLUCOSE BLD-MCNC: 271 MG/DL (ref 70–99)
GLUCOSE BLD-MCNC: 302 MG/DL (ref 70–99)
GLUCOSE SERPL-MCNC: 337 MG/DL (ref 70–99)
HCO3 BLDA-SCNC: 31.3 MMOL/L (ref 21–29)
HCT VFR BLD AUTO: 34.5 % (ref 36–48)
HGB BLD-MCNC: 11.1 G/DL (ref 12–16)
HGB BLDA-MCNC: 11.9 G/DL (ref 12–16)
LYMPHOCYTES # BLD: 0.5 K/UL (ref 1–5.1)
LYMPHOCYTES NFR BLD: 5.7 %
MAGNESIUM SERPL-MCNC: 1.9 MG/DL (ref 1.8–2.4)
MCH RBC QN AUTO: 26.6 PG (ref 26–34)
MCHC RBC AUTO-ENTMCNC: 32.1 G/DL (ref 31–36)
MCV RBC AUTO: 82.8 FL (ref 80–100)
METHGB MFR BLDA: 0.3 %
MONOCYTES # BLD: 0.4 K/UL (ref 0–1.3)
MONOCYTES NFR BLD: 4.5 %
NEUTROPHILS # BLD: 8.2 K/UL (ref 1.7–7.7)
NEUTROPHILS NFR BLD: 89.7 %
O2 THERAPY: ABNORMAL
PCO2 BLDA: 64 MMHG (ref 35–45)
PERFORMED ON: ABNORMAL
PERFORMED ON: ABNORMAL
PH BLDA: 7.31 [PH] (ref 7.35–7.45)
PLATELET # BLD AUTO: 149 K/UL (ref 135–450)
PMV BLD AUTO: 8.1 FL (ref 5–10.5)
PO2 BLDA: 153.8 MMHG (ref 75–108)
POTASSIUM SERPL-SCNC: 3.2 MMOL/L (ref 3.5–5.1)
RBC # BLD AUTO: 4.17 M/UL (ref 4–5.2)
SAO2 % BLDA: 98.7 %
SODIUM SERPL-SCNC: 144 MMOL/L (ref 136–145)
WBC # BLD AUTO: 9.1 K/UL (ref 4–11)

## 2023-07-28 PROCEDURE — 94010 BREATHING CAPACITY TEST: CPT

## 2023-07-28 PROCEDURE — 97162 PT EVAL MOD COMPLEX 30 MIN: CPT

## 2023-07-28 PROCEDURE — 6360000002 HC RX W HCPCS: Performed by: INTERNAL MEDICINE

## 2023-07-28 PROCEDURE — 99223 1ST HOSP IP/OBS HIGH 75: CPT | Performed by: INTERNAL MEDICINE

## 2023-07-28 PROCEDURE — 6360000002 HC RX W HCPCS: Performed by: SURGERY

## 2023-07-28 PROCEDURE — 36600 WITHDRAWAL OF ARTERIAL BLOOD: CPT

## 2023-07-28 PROCEDURE — 2700000000 HC OXYGEN THERAPY PER DAY

## 2023-07-28 PROCEDURE — 94761 N-INVAS EAR/PLS OXIMETRY MLT: CPT

## 2023-07-28 PROCEDURE — 6370000000 HC RX 637 (ALT 250 FOR IP): Performed by: INTERNAL MEDICINE

## 2023-07-28 PROCEDURE — 85025 COMPLETE CBC W/AUTO DIFF WBC: CPT

## 2023-07-28 PROCEDURE — 82803 BLOOD GASES ANY COMBINATION: CPT

## 2023-07-28 PROCEDURE — 80048 BASIC METABOLIC PNL TOTAL CA: CPT

## 2023-07-28 PROCEDURE — 97530 THERAPEUTIC ACTIVITIES: CPT

## 2023-07-28 PROCEDURE — 83735 ASSAY OF MAGNESIUM: CPT

## 2023-07-28 PROCEDURE — 94640 AIRWAY INHALATION TREATMENT: CPT

## 2023-07-28 PROCEDURE — 99238 HOSP IP/OBS DSCHRG MGMT 30/<: CPT | Performed by: INTERNAL MEDICINE

## 2023-07-28 PROCEDURE — 36415 COLL VENOUS BLD VENIPUNCTURE: CPT

## 2023-07-28 PROCEDURE — 2580000003 HC RX 258: Performed by: INTERNAL MEDICINE

## 2023-07-28 RX ORDER — ALBUTEROL SULFATE 2.5 MG/3ML
2.5 SOLUTION RESPIRATORY (INHALATION)
Status: DISCONTINUED | OUTPATIENT
Start: 2023-07-28 | End: 2023-07-28 | Stop reason: HOSPADM

## 2023-07-28 RX ORDER — ENOXAPARIN SODIUM 100 MG/ML
40 INJECTION SUBCUTANEOUS DAILY
Status: DISCONTINUED | OUTPATIENT
Start: 2023-07-29 | End: 2023-07-28 | Stop reason: HOSPADM

## 2023-07-28 RX ADMIN — ATORVASTATIN CALCIUM 40 MG: 40 TABLET, FILM COATED ORAL at 08:58

## 2023-07-28 RX ADMIN — ALBUTEROL SULFATE 2.5 MG: 2.5 SOLUTION RESPIRATORY (INHALATION) at 07:59

## 2023-07-28 RX ADMIN — POTASSIUM CHLORIDE 20 MEQ: 1500 TABLET, EXTENDED RELEASE ORAL at 08:57

## 2023-07-28 RX ADMIN — NADOLOL 20 MG: 40 TABLET ORAL at 08:58

## 2023-07-28 RX ADMIN — INSULIN LISPRO 6 UNITS: 100 INJECTION, SOLUTION INTRAVENOUS; SUBCUTANEOUS at 12:30

## 2023-07-28 RX ADMIN — LORAZEPAM 0.5 MG: 0.5 TABLET ORAL at 11:18

## 2023-07-28 RX ADMIN — ENOXAPARIN SODIUM 30 MG: 100 INJECTION SUBCUTANEOUS at 08:58

## 2023-07-28 RX ADMIN — Medication 10 ML: at 08:59

## 2023-07-28 RX ADMIN — DILTIAZEM HYDROCHLORIDE 240 MG: 240 CAPSULE, COATED, EXTENDED RELEASE ORAL at 08:58

## 2023-07-28 RX ADMIN — LEVOTHYROXINE SODIUM 100 MCG: 100 TABLET ORAL at 05:09

## 2023-07-28 RX ADMIN — PIRFENIDONE 1 TABLET: 267 TABLET, FILM COATED ORAL at 09:00

## 2023-07-28 RX ADMIN — FUROSEMIDE 40 MG: 40 TABLET ORAL at 08:58

## 2023-07-28 RX ADMIN — SERTRALINE 200 MG: 100 TABLET, FILM COATED ORAL at 08:57

## 2023-07-28 RX ADMIN — PANTOPRAZOLE SODIUM 40 MG: 40 TABLET, DELAYED RELEASE ORAL at 05:08

## 2023-07-28 RX ADMIN — ALBUTEROL SULFATE 2.5 MG: 2.5 SOLUTION RESPIRATORY (INHALATION) at 11:31

## 2023-07-28 RX ADMIN — WATER 40 MG: 1 INJECTION INTRAMUSCULAR; INTRAVENOUS; SUBCUTANEOUS at 05:09

## 2023-07-28 RX ADMIN — BENZONATATE 200 MG: 100 CAPSULE ORAL at 08:57

## 2023-07-28 RX ADMIN — INSULIN LISPRO 4 UNITS: 100 INJECTION, SOLUTION INTRAVENOUS; SUBCUTANEOUS at 08:57

## 2023-07-28 RX ADMIN — LORAZEPAM 0.5 MG: 0.5 TABLET ORAL at 05:08

## 2023-07-28 RX ADMIN — FERROUS SULFATE TAB 325 MG (65 MG ELEMENTAL FE) 325 MG: 325 (65 FE) TAB at 08:58

## 2023-07-28 ASSESSMENT — COPD QUESTIONNAIRES
QUESTION1_COUGHFREQUENCY: 2
CAT_TOTALSCORE: 28
QUESTION5_HOMEACTIVITIES: 5
QUESTION6_LEAVINGHOUSE: 5
QUESTION3_CHESTTIGHTNESS: 2
QUESTION7_SLEEPQUALITY: 4
QUESTION8_ENERGYLEVEL: 4
QUESTION2_CHESTPHLEGM: 1
QUESTION4_WALKINCLINE: 5

## 2023-07-28 NOTE — CONSULTS
Reason for referral and CC: fall    HISTORY OF PRESENT ILLNESS: 76 yo female with progressive ILD presented after a fall at home. Had a headache yesterday. Some bruises. She was hypoxic in the ED and placed on 12 L. She is on 10L today but is 99% Spot. LEE and cough at baseline. NO sig sputum production. Pt did no wear AVAPS last night  I turned O2 down to 7lpm  Past Medical History:   Diagnosis Date    A-fib ([address])     Acute cystitis without hematuria     Anxiety     CHF (congestive heart failure) (HCC)     COPD (chronic obstructive pulmonary disease) ([address])     Cryptogenic organizing pneumonia ([address])     Depression     Diabetes mellitus ([address])     GERD (gastroesophageal reflux disease)     Hyperlipidemia     Hypertension     On home oxygen therapy     uses O2 3L prn    Rash     Thyroid disease     hypothyroidism     Past Surgical History:   Procedure Laterality Date    ARM SURGERY Left 3/2/2020    LEFT ULNAR NERVE DECOMPRESSION AT THE ELBOW performed by Keenan Geiger MD at [address] [facility] [address] N/A 6/27/2019    EBUS WF W/ANES.  performed by Kaz Parmar MD at [address] [place] [address]  6/27/2019    BRONCHOSCOPY/TRANSBRONCHIAL NEEDLE BIOPSY performed by Kaz Parmar MD at [address] [place] [address]  6/27/2019    BRONCHOSCOPY/TRANSBRONCHIAL LUNG BIOPSY performed by Kaz Parmar MD at [address] [place] [address]  6/27/2019    BRONCHOSCOPY ALVEOLAR LAVAGE performed by Kaz Parmar MD at [address] [place] [address]  07/10/2019    BRONCHOSCOPY N/A 7/10/2019    BRONCHOSCOPY ALVEOLAR LAVAGE performed by Guzman Bell MD at [address] [place] [address] Bilateral 08/06/2019    BRONCHOSCOPY N/A 8/6/2019    BRONCHOSCOPY ALVEOLAR LAVAGE performed by Ade Breen MD at [address] [place] [address] N/A 12/24/2019    BRONCHOSCOPY ALVEOLAR LAVAGE performed by Ray Rueda MD at [address] 8/25/2022    COLONOSCOPY POLYPECTOMY SNARE/COLD BIOPSY performed by Ronald Dangelo MD at [employer]  9/15/2022    CT GUIDED CHEST TUBE 9/15/2022 [address] SCAN    CT INSERT CATH PLEURA W IMAGE  11/28/2022    CT INSERT CATH PLEURA W IMAGE 11/28/2022 Chucho Bolanos MD [facility] CT SCAN    HYSTERECTOMY, TOTAL ABDOMINAL (CERVIX REMOVED)      partial     Family History  family history includes Asthma in her sister; Diabetes in her mother; High Blood Pressure in her mother; Other in her father. Social History:  reports that she has never smoked. She has never used smokeless tobacco.   reports no history of alcohol use. ALLERGIES:  Patient is allergic to penicillins, cefuroxime, doxycycline, imitrex [sumatriptan], meperidine, sulfa antibiotics, keflex [cephalexin], and tape [adhesive tape].   Continuous Infusions:   sodium chloride       Scheduled Meds:   albuterol  2.5 mg Nebulization Q4H WA RT    atorvastatin  40 mg Oral Daily    benzonatate  200 mg Oral TID    dilTIAZem  240 mg Oral Daily    ferrous sulfate  325 mg Oral Daily with breakfast    furosemide  40 mg Oral Daily    levothyroxine  100 mcg Oral Daily    mirtazapine  30 mg Oral Nightly    nadolol  20 mg Oral Daily    pantoprazole  40 mg Oral QAM AC    Pirfenidone  1 tablet Oral TID    potassium chloride  20 mEq Oral Daily    sertraline  200 mg Oral Daily    sodium chloride flush  5-40 mL IntraVENous 2 times per day    enoxaparin  30 mg SubCUTAneous Daily    insulin lispro  0-8 Units SubCUTAneous TID WC    insulin lispro  0-4 Units SubCUTAneous Nightly     PRN Meds:  guaiFENesin, hydrOXYzine HCl, LORazepam, melatonin, sodium chloride flush, sodium chloride, ondansetron **OR** ondansetron, polyethylene glycol, acetaminophen **OR** acetaminophen, albuterol, cetirizine-psuedoephedrine    REVIEW OF SYSTEMS:  Constitutional: Negative for fever  HENT: Negative for sore throat  Eyes: Negative for redness   Respiratory: + for dyspnea, cough  Cardiovascular: Negative

## 2023-07-28 NOTE — PROGRESS NOTES
Occupational Therapy  Referral received and chart reviewed. Patient sitting in chair with NRB mask. Patient requesting anxiety medication before getting out of chair. RN made aware. OT will follow up later this date as appropriate.        Juan Diego OTR/L #[id number]

## 2023-07-28 NOTE — PROGRESS NOTES
Patient provided a COPD Educational Folder previous admission on 7/20/23 that includes the following materials:     [x]  [address]: Managing your COPD  [x]  ALA: Getting the Most Out of Medication Delivery Devices  [x]  ALA: My COPD Action Plan  [x]  [employer]: Service at Home Cardiopulmonary Rehabilitation   [x]  Smoking Cessation Classes  [x]  Outpatient Spiritual Care Services  [x]  Magnet: Signs of COPD    PATIENT/CAREGIVER TEACHING:   Level of patient/caregiver understanding able to:   [x] Verbalize understanding   [] Demonstrate understanding       [] Teach back        [] Needs reinforcement     []  Other:     Electronically signed by Anna Weiss RN on 7/28/2023 at 8:32 AM

## 2023-07-28 NOTE — ACP (ADVANCE CARE PLANNING)
Advance Care Planning     General Advance Care Planning (ACP) Conversation    Date of Conversation: 7/27/2023  Conducted with: Patient with Decision Making Capacity    Healthcare Decision Maker:    Primary Decision Maker: Rosanna Rae - Spouse - [phone]    Secondary Decision Maker: Michelle Diaz - Child - [phone]  Click here to complete Healthcare Decision Makers including selection of the Healthcare Decision Maker Relationship (ie \"Primary\"). Today we documented Decision Maker(s) consistent with ACP documents on file. Content/Action Overview:   Has ACP document(s) on file - reflects the patient's care preferences  Reviewed DNR/DNI and patient elects Full Code (Attempt Resuscitation)        Length of Voluntary ACP Conversation in minutes:  <16 minutes (Non-Billable)    Melissa Root RN

## 2023-07-28 NOTE — PROGRESS NOTES
The home/rental bipap/cpap unit has passed a visual safety inspection and [employer] Engineering has been called to do a follow up inspection.

## 2023-07-28 NOTE — DISCHARGE SUMMARY
Final Result   No acute findings         XR CHEST PORTABLE   Final Result   1. No acute cardiopulmonary findings. 2.  Chronic interstitial changes         XR KNEE RIGHT (1-2 VIEWS)   Final Result   No acute findings            EKG:  probable NSR with artifact  Voltage criteria for left ventricular hypertrophyInferior infarct  Nonspecific ST abnormality  When compared with ECG of 19-JUL-2023 15:34,artifact now present  T wave inversion less evident in Inferior leadsConfirmed by FAHAD Ardon MD ([id number]) on 7/27/2023 11:43:45 AM          Discharge Medications     Medication List        CONTINUE taking these medications      albuterol (2.5 MG/3ML) 0.083% nebulizer solution  Commonly known as: PROVENTIL  INHALE THE CONTENTS OF 1 VIAL VIA NEBULIZER EVERY 6 HOURS AS NEEDED FOR WHEEZING     atorvastatin 40 MG tablet  Commonly known as: LIPITOR  TAKE 1 TABLET EVERY DAY     benzonatate 200 MG capsule  Commonly known as: TESSALON  TAKE 1 CAPSULE BY MOUTH 3 TIMES A DAY AS NEEDED FOR COUGH     CLARITIN-D 24 HOUR PO     dilTIAZem 240 MG extended release capsule  Commonly known as: CARDIZEM CD  TAKE 1 CAPSULE BY MOUTH EVERY DAY     ferrous sulfate 325 (65 Fe) MG tablet  Commonly known as: IRON 325  Take 1 tablet by mouth daily (with breakfast)     furosemide 40 MG tablet  Commonly known as: LASIX     guaiFENesin 600 MG extended release tablet  Commonly known as: MUCINEX     hydrOXYzine HCl 10 MG tablet  Commonly known as: ATARAX  Take 1 tablet by mouth 3 times daily as needed for Itching     levothyroxine 100 MCG tablet  Commonly known as: SYNTHROID  Take 1 tablet by mouth Daily     LORazepam 0.5 MG tablet  Commonly known as: ATIVAN  Take 1 tablet by mouth every 6 hours as needed for Anxiety for up to 180 days.  Max Daily Amount: 2 mg     melatonin 3 MG Tabs tablet     mirtazapine 30 MG tablet  Commonly known as: REMERON  Take 1 tablet by mouth nightly     nadolol 20 MG tablet  Commonly known as: CORGARD  Take 1 tablet by

## 2023-07-28 NOTE — FLOWSHEET NOTE
07/27/23 2055   Assessment   Charting Type Shift assessment   Psychosocial   Psychosocial (WDL) WDL   Neurological   Neuro (WDL) WDL   Level of Consciousness 1   Orientation Level Oriented X4   Cognition Appropriate judgement; Appropriate safety awareness; Follows commands; Appropriate for developmental age; Appropriate attention/concentration   Speech Clear   [place] Coma Scale   Eye Opening 4   Best Verbal Response 5   Best Motor Response 6   [place] Coma Scale Score 15   HEENT (Head, Ears, Eyes, Nose, & Throat)   HEENT (WDL) X   Right Eye Impaired vision   Left Eye Impaired vision   Teeth Missing teeth   Respiratory   Respiratory (WDL) X  (10L simple mask)   Respiratory Pattern Regular   Respiratory Depth Normal   Respiratory Quality/Effort Dyspnea with exertion   Chest Assessment Chest expansion symmetrical;Trachea midline   L Breath Sounds Diminished   R Breath Sounds Diminished   Cardiac   Cardiac (WDL) WDL   Cardiac Regularity Regular   Heart Sounds S1, S2   Cardiac Rhythm Sinus rhythm   Cardiac Monitor   Telemetry Box Number PCU   Alarm Audible Centralized cardiac monitoring   Telemetry Monitor Alarm Parameters PCU   Gastrointestinal   Abdominal (WDL) WDL   RUQ Bowel Sounds Active   LUQ Bowel Sounds Active   RLQ Bowel Sounds Active   LLQ Bowel Sounds Active   Abdomen Inspection Flat;Soft   Tenderness Soft; No guarding;Nontender; No rebound   Genitourinary   Genitourinary (WDL) X  (purewick)   Peripheral Vascular   Peripheral Vascular (WDL) WDL   Edema None   Skin Integumentary    Skin Integumentary (WDL) X   Skin Color Pale   Skin Condition/Temp Dry; Warm   Skin Integrity Abrasion;Ecchymosis   Location scattered, skin tear to R elbow and R shin   Skin Fold Management No   Nails WDL   Skin Integrity Site 2   Skin Integrity Location 2 Redness   Location 2 sacrum   Preventative Dressing No   Musculoskeletal   Musculoskeletal (WDL) X  (generalized weakness)     Shift assessment completed.  Patient awake in bed

## 2023-07-28 NOTE — CARE COORDINATION
Case Management Assessment  Initial Evaluation    Date/Time of Evaluation: 7/28/2023 9:46 AM  Assessment Completed by: Jeb Fleischer, RN    If patient is discharged prior to next notation, then this note serves as note for discharge by case management. Patient Name: Jacqueline Obando                   YOB: 1955  Diagnosis: Acute respiratory failure with hypoxia ([address]) [J96.01]                   Date / Time: 7/27/2023  9:31 AM    Patient Admission Status: Inpatient   Readmission Risk (Low < 19, Mod (19-27), High > 27): Readmission Risk Score: 36.3    Current PCP: Roberto Gomez MD  PCP verified by CM? Yes (franco)    Chart Reviewed: Yes      History Provided by: Patient  Patient Orientation: Alert and Oriented    Patient Cognition: Alert    Hospitalization in the last 30 days (Readmission):  Yes    If yes, Readmission Assessment in  Navigator will be completed. Advance Directives:      Code Status: Full Code   Patient's Primary Decision Maker is: Named in Scanned ACP Document    Primary Decision Maker: Yovanityshawn Ed - Spouse - [phone]    Secondary Decision Maker: Obinna Loera Child - [phone]    Discharge Planning:    Patient lives with: Spouse/Significant Other Type of Home: House  Primary Care Giver: Self  Patient Support Systems include: Spouse/Significant Other, Family Members   Current Financial resources: Medicaid, Medicare  Current community resources: Other (Comment) (passport and palliative care)  Current services prior to admission: Oxygen Therapy (addy)            Current DME: Oxygen Therapy (Comment), Wheelchair, Juleen Counter (addy)            Type of Home Care services:  None    ADLS  Prior functional level: Assistance with the following:, Housework, Shopping, Cooking, Mobility  Current functional level: Assistance with the following:, Cooking, Housework, Shopping, Mobility    PT AM-PAC:   /24  OT AM-PAC:   /24    Family can provide assistance at DC:  Yes  Would

## 2023-07-28 NOTE — PLAN OF CARE
HEART FAILURE CARE PLAN:    Comorbidities Reviewed: Yes   Patient has a past medical history of A-fib ([address] Acute cystitis without hematuria, Anxiety, CHF (congestive heart failure) ([address]), COPD (chronic obstructive pulmonary disease) ([address]), Cryptogenic organizing pneumonia ([address]), Depression, Diabetes mellitus ([address]), GERD (gastroesophageal reflux disease), Hyperlipidemia, Hypertension, On home oxygen therapy, Rash, and Thyroid disease. ECHOCARDIOGRAM Reviewed: Yes   Patient's Ejection Fraction (EF) is greater than 40%    Weights Reviewed:  Yes   Admission weight: 102 lb (46.3 kg)   Wt Readings from Last 3 Encounters:   07/27/23 123 lb 11.2 oz (56.1 kg)   07/24/23 111 lb 0.4 oz (50.4 kg)   06/30/23 120 lb 14.4 oz (54.8 kg)     Intake & Output Reviewed: Yes   No intake or output data in the 24 hours ending 07/27/23 2233  Medications Reviewed: Yes   [place]:   albuterol  2.5 mg Nebulization 4x Daily RT    atorvastatin  40 mg Oral Daily    benzonatate  200 mg Oral TID    dilTIAZem  240 mg Oral Daily    [START ON 7/28/2023] ferrous sulfate  325 mg Oral Daily with breakfast    furosemide  40 mg Oral Daily    levothyroxine  100 mcg Oral Daily    mirtazapine  30 mg Oral Nightly    nadolol  20 mg Oral Daily    [START ON 7/28/2023] pantoprazole  40 mg Oral QAM AC    Pirfenidone  1 tablet Oral TID    potassium chloride  20 mEq Oral Daily    [START ON 7/28/2023] sertraline  200 mg Oral Daily    sodium chloride flush  5-40 mL IntraVENous 2 times per day    enoxaparin  30 mg SubCUTAneous Daily    ipratropium 0.5 mg-albuterol 2.5 mg  1 Dose Inhalation Q4H WA RT    methylPREDNISolone  40 mg IntraVENous Q12H    Followed by    Renu Childs ON 7/29/2023] predniSONE  40 mg Oral Daily    insulin lispro  0-8 Units SubCUTAneous TID WC    insulin lispro  0-4 Units SubCUTAneous Nightly     ACE/ARB/ARNI is REQUIRED for EF </= 69% SYSTOLIC FAILURE:   ACE[de-identified] None  ARB[de-identified] None  ARNI[de-identified] None    Evidenced-Based Beta Blocker is REQUIRED for EF </= 40% SYSTOLIC FAILURE:   [de-identified]  nadolol    Diuretics:  [de-identified] Furosemide    Diet Reviewed: Yes   ADULT DIET; Easy to Chew; 4 carb choices (60 gm/meal)  ADULT ORAL NUTRITION SUPPLEMENT; Breakfast, Lunch, Dinner; Standard High Calorie/High Protein Oral Supplement    Goal of Care Reviewed: Yes   Patient and/or Family's stated Goal of Care this Admission: reduce shortness of breath, increase activity tolerance, better understand heart failure and disease management, be more comfortable, and reduce lower extremity edema prior to discharge.

## 2023-07-28 NOTE — PROGRESS NOTES
Inpatient Physical Therapy Evaluation & Treatment    Unit: PCU  Date:  7/28/2023  Patient Name:    Rudolph Reid  Admitting diagnosis:  Acute respiratory failure with hypoxia ([address]) [J96.01]  Admit Date:  7/27/2023  Precautions/Restrictions/WB Status/ Lines/ Wounds/ Oxygen: Fall risk, Bed/chair alarm, Lines (Supplemental O2 (10 L), external catheter, and NRB mask PRN), Telemetry, and Continuous pulse oximetry    Treatment Time:  0816 - 0376  Treatment Number:  1   Timed Code Treatment Minutes: 29 minutes  Total Treatment Minutes:  39  minutes    Patient Stated Goals for Therapy: None stated at this time        Discharge Recommendations:  Home with initial 24hr assistance  DME needs for discharge: Needs Met       Therapy recommendation for EMS Transport: can transport by wheelchair    Therapy recommendations for staff:   Stand by assist for ambulation with use of rolling walker (RW) and gait belt within room    History of Present Illness:   Per Mary Ellen Davila PA-C H&P, \"This is a 64-year-old white female with history of COPD, A-fib, cryptogenic organizing pneumonia, interstitial pneumonia, progressive fibrosing lung disease and restrictive lung disease, chronic respiratory failure on 6 to 8 L of oxygen, bronchiectasis, depression, anxiety, diabetes, GERD, hypertension, thyroid disease who comes emergency room with shortness of breath. This patient has end-stage lung disease and end-stage ILD. She has been admitted to the hospital multiple times. She was just discharged from the hospital on July 24, 2023. She returns to the hospital complains of shortness of breath. Extensive discussions have been held with the patient with regards to hospice and palliative care. She has refused this in the past.     Today she came after she fell and struck her shoulder on her dresser. She had some pain in that area. There is no loss of consciousness. She had a small skin tear on the right elbow.  There is no active Co-Signature: Paco Mejia, PT, DPT     If patient discharges from this facility prior to next visit, this note will serve as the Discharge Summary.

## 2023-07-28 NOTE — PLAN OF CARE
Problem: Discharge Planning  Goal: Discharge to home or other facility with appropriate resources  7/28/2023 1030 by Rosa M Stafford RN  Outcome: Completed     Problem: Pain  Goal: Verbalizes/displays adequate comfort level or baseline comfort level  7/28/2023 1030 by Rosa M Stafford RN  Outcome: Completed     Problem: Safety - Adult  Goal: Free from fall injury  7/28/2023 1030 by Rosa M Stafford RN  Outcome: Completed     Problem: ABCDS Injury Assessment  Goal: Absence of physical injury  7/28/2023 1030 by Rosa M Stafford RN  Outcome: Completed     Problem: Skin/Tissue Integrity  Goal: Absence of new skin breakdown  Description: 1. Monitor for areas of redness and/or skin breakdown  2. Assess vascular access sites hourly  3. Every 4-6 hours minimum:  Change oxygen saturation probe site  4. Every 4-6 hours:  If on nasal continuous positive airway pressure, respiratory therapy assess nares and determine need for appliance change or resting period.   7/28/2023 1030 by Rosa M Stafford RN  Outcome: Completed     Problem: Respiratory - Adult  Goal: Achieves optimal ventilation and oxygenation  7/28/2023 1030 by Rosa M Stafford RN  Outcome: Completed     Problem: Cardiovascular - Adult  Goal: Maintains optimal cardiac output and hemodynamic stability  7/28/2023 1030 by Rosa M Stafford RN  Outcome: Completed     Problem: Cardiovascular - Adult  Goal: Absence of cardiac dysrhythmias or at baseline  7/28/2023 1030 by Rosa M Stafford RN  Outcome: Completed     Problem: Skin/Tissue Integrity - Adult  Goal: Skin integrity remains intact  7/28/2023 1030 by Rosa M Stafford RN  Outcome: Completed  Flowsheets (Taken 7/28/2023 1024)  Skin Integrity Remains Intact: Monitor for areas of redness and/or skin breakdown

## 2023-07-28 NOTE — PLAN OF CARE
Problem: Discharge Planning  Goal: Discharge to home or other facility with appropriate resources  Outcome: Progressing  Flowsheets (Taken 7/27/2023 1816 by Phoenix Lakhani, RN)  Discharge to home or other facility with appropriate resources:   Identify barriers to discharge with patient and caregiver   Identify discharge learning needs (meds, wound care, etc)   Refer to discharge planning if patient needs post-hospital services based on physician order or complex needs related to functional status, cognitive ability or social support system   Arrange for needed discharge resources and transportation as appropriate     Problem: Pain  Goal: Verbalizes/displays adequate comfort level or baseline comfort level  Outcome: Progressing     Problem: Safety - Adult  Goal: Free from fall injury  Outcome: Progressing     Problem: ABCDS Injury Assessment  Goal: Absence of physical injury  Outcome: Progressing  Flowsheets (Taken 7/27/2023 1813 by Phoenix Lakhani RN)  Absence of Physical Injury: Implement safety measures based on patient assessment     Problem: Skin/Tissue Integrity  Goal: Absence of new skin breakdown  Description: 1. Monitor for areas of redness and/or skin breakdown  2. Assess vascular access sites hourly  3. Every 4-6 hours minimum:  Change oxygen saturation probe site  4. Every 4-6 hours:  If on nasal continuous positive airway pressure, respiratory therapy assess nares and determine need for appliance change or resting period.   Outcome: Progressing     Problem: Respiratory - Adult  Goal: Achieves optimal ventilation and oxygenation  Outcome: Progressing     Problem: Cardiovascular - Adult  Goal: Maintains optimal cardiac output and hemodynamic stability  Outcome: Progressing  Goal: Absence of cardiac dysrhythmias or at baseline  Outcome: Progressing     Problem: Skin/Tissue Integrity - Adult  Goal: Skin integrity remains intact  Outcome: Progressing  Goal: Incisions, wounds, or drain sites healing without

## 2023-07-31 ENCOUNTER — CARE COORDINATION (OUTPATIENT)
Dept: CASE MANAGEMENT | Age: 68
End: 2023-07-31

## 2023-07-31 ENCOUNTER — TELEMEDICINE (OUTPATIENT)
Dept: FAMILY MEDICINE CLINIC | Age: 68
End: 2023-07-31

## 2023-07-31 DIAGNOSIS — F41.9 ANXIETY: ICD-10-CM

## 2023-07-31 DIAGNOSIS — E11.69 COMBINED HYPERLIPIDEMIA ASSOCIATED WITH TYPE 2 DIABETES MELLITUS (HCC): ICD-10-CM

## 2023-07-31 DIAGNOSIS — Z93.8 S/P CHEST TUBE PLACEMENT (HCC): ICD-10-CM

## 2023-07-31 DIAGNOSIS — D64.9 ANEMIA, UNSPECIFIED TYPE: ICD-10-CM

## 2023-07-31 DIAGNOSIS — D68.9 COAGULATION DEFECT (HCC): ICD-10-CM

## 2023-07-31 DIAGNOSIS — J84.9 ILD (INTERSTITIAL LUNG DISEASE) (HCC): ICD-10-CM

## 2023-07-31 DIAGNOSIS — E78.2 COMBINED HYPERLIPIDEMIA ASSOCIATED WITH TYPE 2 DIABETES MELLITUS (HCC): ICD-10-CM

## 2023-07-31 DIAGNOSIS — J96.21 ACUTE ON CHRONIC RESPIRATORY FAILURE WITH HYPOXIA (HCC): Primary | ICD-10-CM

## 2023-07-31 DIAGNOSIS — I48.0 PAF (PAROXYSMAL ATRIAL FIBRILLATION) (HCC): ICD-10-CM

## 2023-07-31 DIAGNOSIS — Z09 HOSPITAL DISCHARGE FOLLOW-UP: ICD-10-CM

## 2023-07-31 NOTE — PROGRESS NOTES
The patient was located at Home: [facility]  Provider was located at [facility] ([address] [place] [address]): [address],  [address]       An electronic signature was used to authenticate this note.   --Kimberly John MD

## 2023-07-31 NOTE — CARE COORDINATION
[facility] Care Transitions Initial Follow Up Call    Call within 2 business days of discharge: Yes      Patient: Milton Stanley Patient : 1955   MRN: [id number]  Reason for Admission: 1 day readmission -> acute on chronic resp failure with hypoxia and hypercapnia 2/2 end stage ILD, fall, elevated troponin, leukocytosis, chronic dCHF-no AE, chronic anxiety and depression, Paroxysmal A Fib 2/2 hyper coagulable state-not on AC 2/2 anemia and epistaxis (and falls), severe PCM, DM2, HTN, hypothyroidism -> home with [facility], PASSPORT, O2 ([place])-10L concentrator (on 6lpm at baseline but increases to 8lpm PRN and is baseline hypoxic on exertion at home) and astral NIV  Discharge Date: 23 RARS: Readmission Risk Score: 36.7      Last Discharge [address] [facility] [facility]       Date Complaint Diagnosis Description Type Department Provider    23 Fall Acute on chronic respiratory failure with hypoxia ([address]) . .. ED to Hosp-Admission (Discharged) (ADMITTED) Guzman Mullen MD; Tiffani Pandey. ..     1st attempt - CTN attempted follow-up outreach to patient. Message left including CTN contact information. Is follow up appointment scheduled within 7 days of discharge?  Yes - virtual TCM visit today at 215pm.    Follow Up  Future Appointments   Date Time Provider [address]   2023  2:15 PM Shaquille Norman MD [facility] Remy Marsh RN  Care Transition Nurse  [phone] mobile

## 2023-08-01 ENCOUNTER — CARE COORDINATION (OUTPATIENT)
Dept: CASE MANAGEMENT | Age: 68
End: 2023-08-01

## 2023-08-01 NOTE — CARE COORDINATION
[address] [facility] [address] Care Transitions Initial Follow Up Call    Call within 2 business days of discharge: Yes      Patient: Melvi Duque Patient : 1955   MRN: [id number]  Reason for Admission: 1 day readmission -> acute on chronic resp failure with hypoxia and hypercapnia 2/2 end stage ILD, fall, elevated troponin, leukocytosis, chronic dCHF-no AE, chronic anxiety and depression, Paroxysmal A Fib 2/2 hyper coagulable state-not on AC 2/2 anemia and epistaxis (and falls), severe PCM, DM2, HTN, hypothyroidism -> home with [facility], PASSPORT, O2 ([place])-10L concentrator (on 6lpm at baseline but increases to 8lpm PRN and is baseline hypoxic on exertion at home) and astral NIV  Discharge Date: 23 RARS: Readmission Risk Score: 36.7      Last Discharge [address] [facility]       Date Complaint Diagnosis Description Type Department Provider    23 Fall Acute on chronic respiratory failure with hypoxia ([address]) . .. ED to Hosp-Admission (Discharged) (ADMITTED) Dani Bejarano MD; Jim Medley. .. CTN attempted follow-up outreach to patient. Message left including CTN contact information. Follow Up  No future appointments.     John Arango RN  Care Transition Nurse  [phone] mobile

## 2023-08-02 ENCOUNTER — TELEPHONE (OUTPATIENT)
Dept: FAMILY MEDICINE CLINIC | Age: 68
End: 2023-08-02

## 2023-08-02 DIAGNOSIS — E11.69 COMBINED HYPERLIPIDEMIA ASSOCIATED WITH TYPE 2 DIABETES MELLITUS (HCC): ICD-10-CM

## 2023-08-02 DIAGNOSIS — E78.2 COMBINED HYPERLIPIDEMIA ASSOCIATED WITH TYPE 2 DIABETES MELLITUS (HCC): ICD-10-CM

## 2023-08-02 RX ORDER — ATORVASTATIN CALCIUM 40 MG/1
TABLET, FILM COATED ORAL
Qty: 90 TABLET | Refills: 1 | Status: SHIPPED | OUTPATIENT
Start: 2023-08-02

## 2023-08-02 RX ORDER — ONDANSETRON 4 MG/1
4 TABLET, FILM COATED ORAL 3 TIMES DAILY PRN
Qty: 30 TABLET | Refills: 2 | Status: SHIPPED | OUTPATIENT
Start: 2023-08-02

## 2023-08-02 NOTE — TELEPHONE ENCOUNTER
Since I saw her earlier this week for a hospital follow-up, she does not need that visit on 8/31/2023. Please let her know that and canceled the visit.

## 2023-08-04 DIAGNOSIS — J98.4 RESTRICTIVE LUNG DISEASE: ICD-10-CM

## 2023-08-07 RX ORDER — ALBUTEROL SULFATE 2.5 MG/3ML
SOLUTION RESPIRATORY (INHALATION)
Qty: 120 EACH | Refills: 0 | Status: SHIPPED | OUTPATIENT
Start: 2023-08-07

## 2023-08-09 ENCOUNTER — TELEPHONE (OUTPATIENT)
Dept: PULMONOLOGY | Age: 68
End: 2023-08-09

## 2023-08-09 DIAGNOSIS — J84.9 ILD (INTERSTITIAL LUNG DISEASE) (HCC): Primary | ICD-10-CM

## 2023-08-09 NOTE — TELEPHONE ENCOUNTER
Paul Tracy with [facility] LM on VM asking for office to CB. I called Paul Tracy back she was referring to pulmonary rehab order.  Nurse aware

## 2023-08-21 ENCOUNTER — APPOINTMENT (OUTPATIENT)
Dept: GENERAL RADIOLOGY | Age: 68
DRG: 196 | End: 2023-08-21
Payer: MEDICARE

## 2023-08-21 ENCOUNTER — HOSPITAL ENCOUNTER (INPATIENT)
Age: 68
LOS: 2 days | Discharge: HOME | DRG: 196 | End: 2023-08-23
Attending: EMERGENCY MEDICINE | Admitting: INTERNAL MEDICINE
Payer: MEDICARE

## 2023-08-21 DIAGNOSIS — J96.21 ACUTE ON CHRONIC RESPIRATORY FAILURE WITH HYPOXIA (HCC): Primary | ICD-10-CM

## 2023-08-21 LAB
ALBUMIN SERPL-MCNC: 4.2 G/DL (ref 3.4–5)
ALBUMIN/GLOB SERPL: 2.3 {RATIO} (ref 1.1–2.2)
ALP SERPL-CCNC: 93 U/L (ref 40–129)
ALT SERPL-CCNC: 27 U/L (ref 10–40)
ANION GAP SERPL CALCULATED.3IONS-SCNC: 11 MMOL/L (ref 3–16)
ANISOCYTOSIS BLD QL SMEAR: ABNORMAL
AST SERPL-CCNC: 23 U/L (ref 15–37)
BASOPHILS # BLD: 0.1 K/UL (ref 0–0.2)
BASOPHILS NFR BLD: 1 %
BILIRUB SERPL-MCNC: 0.3 MG/DL (ref 0–1)
BUN SERPL-MCNC: 20 MG/DL (ref 7–20)
CALCIUM SERPL-MCNC: 8.7 MG/DL (ref 8.3–10.6)
CHLORIDE SERPL-SCNC: 99 MMOL/L (ref 99–110)
CO2 SERPL-SCNC: 33 MMOL/L (ref 21–32)
CREAT SERPL-MCNC: 0.6 MG/DL (ref 0.6–1.2)
DEPRECATED RDW RBC AUTO: 17.7 % (ref 12.4–15.4)
EOSINOPHIL # BLD: 0.7 K/UL (ref 0–0.6)
EOSINOPHIL NFR BLD: 5 %
GFR SERPLBLD CREATININE-BSD FMLA CKD-EPI: >60 ML/MIN/{1.73_M2}
GLUCOSE BLD-MCNC: 168 MG/DL (ref 70–99)
GLUCOSE SERPL-MCNC: 118 MG/DL (ref 70–99)
HCT VFR BLD AUTO: 35.6 % (ref 36–48)
HGB BLD-MCNC: 11.5 G/DL (ref 12–16)
HYPOCHROMIA BLD QL SMEAR: ABNORMAL
LYMPHOCYTES # BLD: 0.4 K/UL (ref 1–5.1)
LYMPHOCYTES NFR BLD: 3 %
MCH RBC QN AUTO: 27 PG (ref 26–34)
MCHC RBC AUTO-ENTMCNC: 32.3 G/DL (ref 31–36)
MCV RBC AUTO: 83.6 FL (ref 80–100)
MICROCYTES BLD QL SMEAR: ABNORMAL
MONOCYTES # BLD: 0.4 K/UL (ref 0–1.3)
MONOCYTES NFR BLD: 3 %
NEUTROPHILS # BLD: 12.7 K/UL (ref 1.7–7.7)
NEUTROPHILS NFR BLD: 87 %
NEUTS BAND NFR BLD MANUAL: 1 % (ref 0–7)
PERFORMED ON: ABNORMAL
PLATELET # BLD AUTO: 159 K/UL (ref 135–450)
PLATELET BLD QL SMEAR: ADEQUATE
PMV BLD AUTO: 7.2 FL (ref 5–10.5)
POTASSIUM SERPL-SCNC: 3.9 MMOL/L (ref 3.5–5.1)
PROT SERPL-MCNC: 6 G/DL (ref 6.4–8.2)
RBC # BLD AUTO: 4.26 M/UL (ref 4–5.2)
SLIDE REVIEW: ABNORMAL
SODIUM SERPL-SCNC: 143 MMOL/L (ref 136–145)
TROPONIN, HIGH SENSITIVITY: 19 NG/L (ref 0–14)
WBC # BLD AUTO: 14.4 K/UL (ref 4–11)

## 2023-08-21 PROCEDURE — 96374 THER/PROPH/DIAG INJ IV PUSH: CPT

## 2023-08-21 PROCEDURE — 84484 ASSAY OF TROPONIN QUANT: CPT

## 2023-08-21 PROCEDURE — 6370000000 HC RX 637 (ALT 250 FOR IP): Performed by: EMERGENCY MEDICINE

## 2023-08-21 PROCEDURE — 6360000002 HC RX W HCPCS: Performed by: EMERGENCY MEDICINE

## 2023-08-21 PROCEDURE — 36415 COLL VENOUS BLD VENIPUNCTURE: CPT

## 2023-08-21 PROCEDURE — 71045 X-RAY EXAM CHEST 1 VIEW: CPT

## 2023-08-21 PROCEDURE — 94660 CPAP INITIATION&MGMT: CPT

## 2023-08-21 PROCEDURE — 2060000000 HC ICU INTERMEDIATE R&B

## 2023-08-21 PROCEDURE — 94761 N-INVAS EAR/PLS OXIMETRY MLT: CPT

## 2023-08-21 PROCEDURE — 80053 COMPREHEN METABOLIC PANEL: CPT

## 2023-08-21 PROCEDURE — 99285 EMERGENCY DEPT VISIT HI MDM: CPT

## 2023-08-21 PROCEDURE — 2580000003 HC RX 258: Performed by: INTERNAL MEDICINE

## 2023-08-21 PROCEDURE — 85025 COMPLETE CBC W/AUTO DIFF WBC: CPT

## 2023-08-21 PROCEDURE — 93005 ELECTROCARDIOGRAM TRACING: CPT | Performed by: EMERGENCY MEDICINE

## 2023-08-21 PROCEDURE — 2700000000 HC OXYGEN THERAPY PER DAY

## 2023-08-21 RX ORDER — GUAIFENESIN 600 MG/1
600 TABLET, EXTENDED RELEASE ORAL EVERY 12 HOURS PRN
Status: DISCONTINUED | OUTPATIENT
Start: 2023-08-22 | End: 2023-08-23 | Stop reason: HOSPADM

## 2023-08-21 RX ORDER — DILTIAZEM HYDROCHLORIDE 240 MG/1
240 CAPSULE, COATED, EXTENDED RELEASE ORAL DAILY
Status: DISCONTINUED | OUTPATIENT
Start: 2023-08-22 | End: 2023-08-23 | Stop reason: HOSPADM

## 2023-08-21 RX ORDER — METHYLPREDNISOLONE SODIUM SUCCINATE 1 G/16ML
125 INJECTION, POWDER, LYOPHILIZED, FOR SOLUTION INTRAMUSCULAR; INTRAVENOUS ONCE
Status: COMPLETED | OUTPATIENT
Start: 2023-08-21 | End: 2023-08-21

## 2023-08-21 RX ORDER — ONDANSETRON 4 MG/1
4 TABLET, ORALLY DISINTEGRATING ORAL EVERY 8 HOURS PRN
Status: DISCONTINUED | OUTPATIENT
Start: 2023-08-21 | End: 2023-08-23 | Stop reason: HOSPADM

## 2023-08-21 RX ORDER — POLYETHYLENE GLYCOL 3350 17 G/17G
17 POWDER, FOR SOLUTION ORAL DAILY PRN
Status: DISCONTINUED | OUTPATIENT
Start: 2023-08-21 | End: 2023-08-23 | Stop reason: HOSPADM

## 2023-08-21 RX ORDER — ATORVASTATIN CALCIUM 40 MG/1
40 TABLET, FILM COATED ORAL DAILY
Status: DISCONTINUED | OUTPATIENT
Start: 2023-08-22 | End: 2023-08-23 | Stop reason: HOSPADM

## 2023-08-21 RX ORDER — ACETAMINOPHEN 325 MG/1
650 TABLET ORAL EVERY 6 HOURS PRN
Status: DISCONTINUED | OUTPATIENT
Start: 2023-08-21 | End: 2023-08-23 | Stop reason: HOSPADM

## 2023-08-21 RX ORDER — LEVOTHYROXINE SODIUM 0.1 MG/1
100 TABLET ORAL DAILY
Status: DISCONTINUED | OUTPATIENT
Start: 2023-08-22 | End: 2023-08-23 | Stop reason: HOSPADM

## 2023-08-21 RX ORDER — PREDNISONE 20 MG/1
40 TABLET ORAL DAILY
Status: DISCONTINUED | OUTPATIENT
Start: 2023-08-24 | End: 2023-08-22

## 2023-08-21 RX ORDER — HYDROXYZINE HYDROCHLORIDE 10 MG/1
10 TABLET, FILM COATED ORAL 3 TIMES DAILY PRN
Status: DISCONTINUED | OUTPATIENT
Start: 2023-08-21 | End: 2023-08-23 | Stop reason: HOSPADM

## 2023-08-21 RX ORDER — IPRATROPIUM BROMIDE AND ALBUTEROL SULFATE 2.5; .5 MG/3ML; MG/3ML
2 SOLUTION RESPIRATORY (INHALATION) ONCE
Status: COMPLETED | OUTPATIENT
Start: 2023-08-21 | End: 2023-08-21

## 2023-08-21 RX ORDER — SODIUM CHLORIDE 9 MG/ML
INJECTION, SOLUTION INTRAVENOUS PRN
Status: DISCONTINUED | OUTPATIENT
Start: 2023-08-21 | End: 2023-08-23 | Stop reason: HOSPADM

## 2023-08-21 RX ORDER — FERROUS SULFATE 325(65) MG
325 TABLET ORAL
Status: DISCONTINUED | OUTPATIENT
Start: 2023-08-22 | End: 2023-08-23 | Stop reason: HOSPADM

## 2023-08-21 RX ORDER — NADOLOL 40 MG/1
20 TABLET ORAL DAILY
Status: DISCONTINUED | OUTPATIENT
Start: 2023-08-22 | End: 2023-08-23 | Stop reason: HOSPADM

## 2023-08-21 RX ORDER — ENOXAPARIN SODIUM 100 MG/ML
40 INJECTION SUBCUTANEOUS DAILY
Status: DISCONTINUED | OUTPATIENT
Start: 2023-08-22 | End: 2023-08-23 | Stop reason: HOSPADM

## 2023-08-21 RX ORDER — ONDANSETRON 2 MG/ML
4 INJECTION INTRAMUSCULAR; INTRAVENOUS EVERY 6 HOURS PRN
Status: DISCONTINUED | OUTPATIENT
Start: 2023-08-21 | End: 2023-08-23 | Stop reason: HOSPADM

## 2023-08-21 RX ORDER — LORAZEPAM 0.5 MG/1
0.5 TABLET ORAL EVERY 6 HOURS PRN
Status: DISCONTINUED | OUTPATIENT
Start: 2023-08-21 | End: 2023-08-23 | Stop reason: HOSPADM

## 2023-08-21 RX ORDER — ACETAMINOPHEN 650 MG/1
650 SUPPOSITORY RECTAL EVERY 6 HOURS PRN
Status: DISCONTINUED | OUTPATIENT
Start: 2023-08-21 | End: 2023-08-23 | Stop reason: HOSPADM

## 2023-08-21 RX ORDER — ALBUTEROL SULFATE 2.5 MG/3ML
2.5 SOLUTION RESPIRATORY (INHALATION) EVERY 6 HOURS PRN
Status: DISCONTINUED | OUTPATIENT
Start: 2023-08-21 | End: 2023-08-23 | Stop reason: HOSPADM

## 2023-08-21 RX ORDER — SERTRALINE HYDROCHLORIDE 100 MG/1
200 TABLET, FILM COATED ORAL DAILY
Status: DISCONTINUED | OUTPATIENT
Start: 2023-08-22 | End: 2023-08-23 | Stop reason: HOSPADM

## 2023-08-21 RX ORDER — POTASSIUM CHLORIDE 20 MEQ/1
20 TABLET, EXTENDED RELEASE ORAL DAILY
Status: DISCONTINUED | OUTPATIENT
Start: 2023-08-22 | End: 2023-08-23 | Stop reason: HOSPADM

## 2023-08-21 RX ORDER — SODIUM CHLORIDE 0.9 % (FLUSH) 0.9 %
5-40 SYRINGE (ML) INJECTION PRN
Status: DISCONTINUED | OUTPATIENT
Start: 2023-08-21 | End: 2023-08-23 | Stop reason: HOSPADM

## 2023-08-21 RX ORDER — LANOLIN ALCOHOL/MO/W.PET/CERES
3 CREAM (GRAM) TOPICAL NIGHTLY PRN
Status: DISCONTINUED | OUTPATIENT
Start: 2023-08-21 | End: 2023-08-23 | Stop reason: HOSPADM

## 2023-08-21 RX ORDER — SODIUM CHLORIDE 0.9 % (FLUSH) 0.9 %
5-40 SYRINGE (ML) INJECTION EVERY 12 HOURS SCHEDULED
Status: DISCONTINUED | OUTPATIENT
Start: 2023-08-21 | End: 2023-08-23 | Stop reason: HOSPADM

## 2023-08-21 RX ORDER — WATER 1000 ML/1000ML
INJECTION, SOLUTION INTRAVENOUS
Status: DISPENSED
Start: 2023-08-21 | End: 2023-08-22

## 2023-08-21 RX ORDER — PANTOPRAZOLE SODIUM 40 MG/1
40 TABLET, DELAYED RELEASE ORAL
Status: DISCONTINUED | OUTPATIENT
Start: 2023-08-22 | End: 2023-08-23 | Stop reason: HOSPADM

## 2023-08-21 RX ORDER — FUROSEMIDE 40 MG/1
40 TABLET ORAL EVERY OTHER DAY
Status: DISCONTINUED | OUTPATIENT
Start: 2023-08-22 | End: 2023-08-23 | Stop reason: HOSPADM

## 2023-08-21 RX ORDER — BENZONATATE 100 MG/1
200 CAPSULE ORAL EVERY 8 HOURS PRN
Status: DISCONTINUED | OUTPATIENT
Start: 2023-08-21 | End: 2023-08-23 | Stop reason: HOSPADM

## 2023-08-21 RX ADMIN — IPRATROPIUM BROMIDE AND ALBUTEROL SULFATE 2 DOSE: .5; 2.5 SOLUTION RESPIRATORY (INHALATION) at 17:56

## 2023-08-21 RX ADMIN — SODIUM CHLORIDE, PRESERVATIVE FREE 10 ML: 5 INJECTION INTRAVENOUS at 23:01

## 2023-08-21 RX ADMIN — METHYLPREDNISOLONE SODIUM SUCCINATE 125 MG: 125 INJECTION INTRAMUSCULAR; INTRAVENOUS at 17:55

## 2023-08-21 ASSESSMENT — ENCOUNTER SYMPTOMS
VOMITING: 0
SHORTNESS OF BREATH: 1
NAUSEA: 0
TROUBLE SWALLOWING: 0
VOICE CHANGE: 0
DIARRHEA: 0

## 2023-08-21 NOTE — H&P
V2.0  History and Physical      Name:  Johnathon Obando /Age/Sex: 1955  (77 y.o. female)   MRN & CSN:  [id number] & [id number] Encounter Date/Time: 2023 7:38 PM EDT   Location:   PCP: Barbara Calderon [address] [place] [address] 349 Day: 1    Assessment and Plan: Luis Botello is a 77 y.o. female with a pmh of ILD who presents with Acute respiratory failure [facility]    Hospital Problems             Last Modified POA    * (Principal) Acute respiratory failure ([address]) 2023 Yes       Plan:  Acute on Chronic Respiratory Failure w/Hypoxia and Hypercapnia 2/2 end-stage interstitial lung disease  -Prednisone 60 mg daily   -Continue on pirfenidone   -Bilevel non-invasive positive pressure ventilation   -Pulmonary consult to assist in management of end-stage ILD  Paroxysmal A-fib  [id number] Score for Atrial Fibrillation Stroke Risk   Risk   Factors  Component Value   C CHF Yes 1   H HTN Yes 1   A2 Age >= 75 No,  (77 y.o.) 0   D DM Yes 1   S2 Prior Stroke/TIA No 0   V Vascular Disease No 0   A Age 77-78 Yes,  (67 y.o.) 1   Sc Sex female 1    [id number]  Score  5   Score last updated 23 10:17 PM EDT  3. Chronic anxiety and depression    - Continue home regimen including Zoloft, Remeron, Ativan and Atarax   -   4. DM type 2  - SSI  - carb control diet  - monitor BG     5. HTN  - continue Lasix, Corgard and Cardizem. - monitor BP     6.  Hypothyroidism  - Continue synthroid     Disposition:   Current Living situation: Home   Expected Disposition: Home       Diet No diet orders on file   DVT Prophylaxis [x] Lovenox, []  Heparin, [] SCDs, [] Ambulation,  [] Eliquis, [] Xarelto, [] Coumadin   Code Status Prior   Surrogate Decision Maker/ POA      Personally reviewed Lab Studies and Imaging       History from:     patient    History of Present Illness:     Chief Complaint:  SOB   Luis Botello is a 77 y.o. female with pmh of chronic respiratory failure with hypercapnia and  hypoxia , A fib who

## 2023-08-21 NOTE — PROGRESS NOTES
Telemetry Assignment Communication Form    Patient has orders for continuous telemetry OR pulse oximeter only orders    To be filled out by Clinical    Patient has Admission or Transfer orders and is assigned to Room Number: 328    Continuous Telemetry:  Yes       WITH/WITHOUT: with Continuous pulse ox    Patient has pulse ox ONLY orders:  No    (Once this top section is completed:  Select \"Route\" and send note to Fax number: [phone] ))      ___________________________________________________________________________      To be filled out by [facility]    Patient assigned to tele box number: __________________               (to be written in by [facility] when telemetry box is assigned to patient)    ___________________________________________________________________________      Bedside RN confirming that the box listed above is in fact the telemetry box number being placed on the patient listed above.         X________________________________________ RN signature        __________________________________________RN assigned to Patient (please print)    _______________ Date    ____________ Time

## 2023-08-21 NOTE — ED PROVIDER NOTES
[address] [facility]  eMERGENCY dEPARTMENT eNCOUnter      Pt Name: Terrance Rodriguez  MRN: [id number]  [address] [facility] 1955  Date of evaluation: 8/21/2023  Provider: Lulu Williamson MD    CHIEF COMPLAINT       Chief Complaint   Patient presents with    Shortness of Breath     Pt with chronic respiratory issues states that she believes she had a panic attack today and was unable to catch her breath afterwards. Patient arrives 86% on home 8L HFNC which is close to baseline for her. She states that she is typically 88-90% on 8L. HISTORY OF PRESENT ILLNESS   (Location/Symptom, Timing/Onset, Context/Setting, Quality, Duration, Modifying Factors, Severity)  Note limiting factors. History obtained from: the patient    Terrance Rodriguez is a 77 y.o. female with history of end-stage chronic lung disease who is on 8 L of oxygen at home with numerous admissions for shortness of breath and hypoxia who presents via EMS due to 1 day of worsening shortness of breath from baseline. Patient denies any chest pain or hemoptysis. Patient reports this is consistent with previous chronic lung disease exacerbations. HPI    Nursing Notes were reviewed. REVIEW OFSYSTEMS    (2-9 systems for level 4, 10 or more for level 5)     Review of Systems   Constitutional:  Negative for appetite change and fever. HENT:  Negative for trouble swallowing and voice change. Eyes:  Negative for visual disturbance. Respiratory:  Positive for shortness of breath. Cardiovascular:  Negative for chest pain and palpitations. Gastrointestinal:  Negative for diarrhea, nausea and vomiting. Genitourinary:  Negative for dysuria. Musculoskeletal:  Negative for gait problem. Neurological:  Negative for seizures and syncope. Psychiatric/Behavioral:  Negative for self-injury and suicidal ideas. Except as noted above the remainder of the review of systems was reviewed and negative.        PAST MEDICAL HISTORY

## 2023-08-22 PROBLEM — I50.9 CHRONIC CONGESTIVE HEART FAILURE (HCC): Status: ACTIVE | Noted: 2023-08-22

## 2023-08-22 LAB
BASOPHILS # BLD: 0 K/UL (ref 0–0.2)
BASOPHILS NFR BLD: 0.2 %
BILIRUB UR QL STRIP.AUTO: NEGATIVE
CLARITY UR: CLEAR
COLOR UR: YELLOW
DEPRECATED RDW RBC AUTO: 17.1 % (ref 12.4–15.4)
EKG ATRIAL RATE: 85 BPM
EKG DIAGNOSIS: NORMAL
EKG P AXIS: 25 DEGREES
EKG P-R INTERVAL: 132 MS
EKG Q-T INTERVAL: 376 MS
EKG QRS DURATION: 80 MS
EKG QTC CALCULATION (BAZETT): 447 MS
EKG R AXIS: 21 DEGREES
EKG T AXIS: 4 DEGREES
EKG VENTRICULAR RATE: 85 BPM
EOSINOPHIL # BLD: 0 K/UL (ref 0–0.6)
EOSINOPHIL NFR BLD: 0 %
GLUCOSE BLD-MCNC: 139 MG/DL (ref 70–99)
GLUCOSE BLD-MCNC: 216 MG/DL (ref 70–99)
GLUCOSE BLD-MCNC: 237 MG/DL (ref 70–99)
GLUCOSE BLD-MCNC: 262 MG/DL (ref 70–99)
GLUCOSE UR STRIP.AUTO-MCNC: >=1000 MG/DL
HCT VFR BLD AUTO: 34.6 % (ref 36–48)
HGB BLD-MCNC: 11.4 G/DL (ref 12–16)
HGB UR QL STRIP.AUTO: NEGATIVE
KETONES UR STRIP.AUTO-MCNC: NEGATIVE MG/DL
LEUKOCYTE ESTERASE UR QL STRIP.AUTO: NEGATIVE
LYMPHOCYTES # BLD: 0.6 K/UL (ref 1–5.1)
LYMPHOCYTES NFR BLD: 6 %
MCH RBC QN AUTO: 27.6 PG (ref 26–34)
MCHC RBC AUTO-ENTMCNC: 33 G/DL (ref 31–36)
MCV RBC AUTO: 83.6 FL (ref 80–100)
MONOCYTES # BLD: 0.2 K/UL (ref 0–1.3)
MONOCYTES NFR BLD: 2.3 %
NEUTROPHILS # BLD: 9.3 K/UL (ref 1.7–7.7)
NEUTROPHILS NFR BLD: 91.5 %
NITRITE UR QL STRIP.AUTO: NEGATIVE
PERFORMED ON: ABNORMAL
PH UR STRIP.AUTO: 6.5 [PH] (ref 5–8)
PLATELET # BLD AUTO: 169 K/UL (ref 135–450)
PMV BLD AUTO: 7.3 FL (ref 5–10.5)
PROCALCITONIN SERPL IA-MCNC: 0.05 NG/ML (ref 0–0.15)
PROT UR STRIP.AUTO-MCNC: NEGATIVE MG/DL
RBC # BLD AUTO: 4.13 M/UL (ref 4–5.2)
SP GR UR STRIP.AUTO: 1.01 (ref 1–1.03)
UA COMPLETE W REFLEX CULTURE PNL UR: ABNORMAL
UA DIPSTICK W REFLEX MICRO PNL UR: ABNORMAL
URN SPEC COLLECT METH UR: ABNORMAL
UROBILINOGEN UR STRIP-ACNC: 0.2 E.U./DL
WBC # BLD AUTO: 10.2 K/UL (ref 4–11)

## 2023-08-22 PROCEDURE — 6360000002 HC RX W HCPCS: Performed by: INTERNAL MEDICINE

## 2023-08-22 PROCEDURE — 87205 SMEAR GRAM STAIN: CPT

## 2023-08-22 PROCEDURE — 2580000003 HC RX 258: Performed by: INTERNAL MEDICINE

## 2023-08-22 PROCEDURE — 83036 HEMOGLOBIN GLYCOSYLATED A1C: CPT

## 2023-08-22 PROCEDURE — 94660 CPAP INITIATION&MGMT: CPT

## 2023-08-22 PROCEDURE — 99223 1ST HOSP IP/OBS HIGH 75: CPT | Performed by: INTERNAL MEDICINE

## 2023-08-22 PROCEDURE — 85025 COMPLETE CBC W/AUTO DIFF WBC: CPT

## 2023-08-22 PROCEDURE — 93010 ELECTROCARDIOGRAM REPORT: CPT | Performed by: INTERNAL MEDICINE

## 2023-08-22 PROCEDURE — 99233 SBSQ HOSP IP/OBS HIGH 50: CPT

## 2023-08-22 PROCEDURE — 2060000000 HC ICU INTERMEDIATE R&B

## 2023-08-22 PROCEDURE — 81003 URINALYSIS AUTO W/O SCOPE: CPT

## 2023-08-22 PROCEDURE — 6370000000 HC RX 637 (ALT 250 FOR IP): Performed by: INTERNAL MEDICINE

## 2023-08-22 PROCEDURE — 36415 COLL VENOUS BLD VENIPUNCTURE: CPT

## 2023-08-22 PROCEDURE — 94640 AIRWAY INHALATION TREATMENT: CPT

## 2023-08-22 PROCEDURE — 6370000000 HC RX 637 (ALT 250 FOR IP)

## 2023-08-22 PROCEDURE — 2700000000 HC OXYGEN THERAPY PER DAY

## 2023-08-22 PROCEDURE — 94761 N-INVAS EAR/PLS OXIMETRY MLT: CPT

## 2023-08-22 PROCEDURE — 84145 PROCALCITONIN (PCT): CPT

## 2023-08-22 PROCEDURE — 87070 CULTURE OTHR SPECIMN AEROBIC: CPT

## 2023-08-22 RX ORDER — DEXTROSE MONOHYDRATE 100 MG/ML
INJECTION, SOLUTION INTRAVENOUS CONTINUOUS PRN
Status: DISCONTINUED | OUTPATIENT
Start: 2023-08-22 | End: 2023-08-23 | Stop reason: HOSPADM

## 2023-08-22 RX ORDER — ALBUTEROL SULFATE 90 UG/1
2 AEROSOL, METERED RESPIRATORY (INHALATION) EVERY 6 HOURS PRN
COMMUNITY

## 2023-08-22 RX ORDER — INSULIN LISPRO 100 [IU]/ML
0-4 INJECTION, SOLUTION INTRAVENOUS; SUBCUTANEOUS
Status: DISCONTINUED | OUTPATIENT
Start: 2023-08-22 | End: 2023-08-23 | Stop reason: HOSPADM

## 2023-08-22 RX ORDER — INSULIN LISPRO 100 [IU]/ML
0-4 INJECTION, SOLUTION INTRAVENOUS; SUBCUTANEOUS NIGHTLY
Status: DISCONTINUED | OUTPATIENT
Start: 2023-08-22 | End: 2023-08-23 | Stop reason: HOSPADM

## 2023-08-22 RX ORDER — PREDNISONE 20 MG/1
60 TABLET ORAL DAILY
Status: DISCONTINUED | OUTPATIENT
Start: 2023-08-24 | End: 2023-08-22

## 2023-08-22 RX ORDER — ALBUTEROL SULFATE 2.5 MG/3ML
2.5 SOLUTION RESPIRATORY (INHALATION)
Status: DISCONTINUED | OUTPATIENT
Start: 2023-08-22 | End: 2023-08-22

## 2023-08-22 RX ORDER — PIRFENIDONE 267 MG/1
1 CAPSULE ORAL 3 TIMES DAILY
COMMUNITY

## 2023-08-22 RX ORDER — ALBUTEROL SULFATE 2.5 MG/3ML
2.5 SOLUTION RESPIRATORY (INHALATION)
Status: DISCONTINUED | OUTPATIENT
Start: 2023-08-22 | End: 2023-08-23 | Stop reason: HOSPADM

## 2023-08-22 RX ORDER — PIRFENIDONE 267 MG/1
1 TABLET, FILM COATED ORAL 3 TIMES DAILY
Status: DISCONTINUED | OUTPATIENT
Start: 2023-08-22 | End: 2023-08-23 | Stop reason: HOSPADM

## 2023-08-22 RX ADMIN — FUROSEMIDE 40 MG: 40 TABLET ORAL at 10:26

## 2023-08-22 RX ADMIN — BENZONATATE 200 MG: 100 CAPSULE ORAL at 10:25

## 2023-08-22 RX ADMIN — DILTIAZEM HYDROCHLORIDE 240 MG: 240 CAPSULE, COATED, EXTENDED RELEASE ORAL at 10:25

## 2023-08-22 RX ADMIN — LORAZEPAM 0.5 MG: 0.5 TABLET ORAL at 02:31

## 2023-08-22 RX ADMIN — HYDROXYZINE HYDROCHLORIDE 10 MG: 10 TABLET ORAL at 21:14

## 2023-08-22 RX ADMIN — SERTRALINE 200 MG: 100 TABLET, FILM COATED ORAL at 10:25

## 2023-08-22 RX ADMIN — FERROUS SULFATE TAB 325 MG (65 MG ELEMENTAL FE) 325 MG: 325 (65 FE) TAB at 10:26

## 2023-08-22 RX ADMIN — PIRFENIDONE 1 TABLET: 267 TABLET, FILM COATED ORAL at 10:24

## 2023-08-22 RX ADMIN — SODIUM CHLORIDE, PRESERVATIVE FREE 10 ML: 5 INJECTION INTRAVENOUS at 11:26

## 2023-08-22 RX ADMIN — ALBUTEROL SULFATE 2.5 MG: 2.5 SOLUTION RESPIRATORY (INHALATION) at 20:52

## 2023-08-22 RX ADMIN — LORAZEPAM 0.5 MG: 0.5 TABLET ORAL at 16:34

## 2023-08-22 RX ADMIN — GUAIFENESIN 600 MG: 600 TABLET, EXTENDED RELEASE ORAL at 10:25

## 2023-08-22 RX ADMIN — POTASSIUM CHLORIDE 20 MEQ: 1500 TABLET, EXTENDED RELEASE ORAL at 10:26

## 2023-08-22 RX ADMIN — INSULIN LISPRO 2 UNITS: 100 INJECTION, SOLUTION INTRAVENOUS; SUBCUTANEOUS at 12:22

## 2023-08-22 RX ADMIN — NADOLOL 20 MG: 40 TABLET ORAL at 10:26

## 2023-08-22 RX ADMIN — PIRFENIDONE 1 TABLET: 267 TABLET, FILM COATED ORAL at 21:16

## 2023-08-22 RX ADMIN — LORAZEPAM 0.5 MG: 0.5 TABLET ORAL at 10:23

## 2023-08-22 RX ADMIN — ALBUTEROL SULFATE 2.5 MG: 2.5 SOLUTION RESPIRATORY (INHALATION) at 15:55

## 2023-08-22 RX ADMIN — ATORVASTATIN CALCIUM 40 MG: 40 TABLET, FILM COATED ORAL at 10:26

## 2023-08-22 RX ADMIN — METHYLPREDNISOLONE SODIUM SUCCINATE 40 MG: 40 INJECTION INTRAMUSCULAR; INTRAVENOUS at 05:46

## 2023-08-22 RX ADMIN — SODIUM CHLORIDE, PRESERVATIVE FREE 10 ML: 5 INJECTION INTRAVENOUS at 21:15

## 2023-08-22 RX ADMIN — ALBUTEROL SULFATE 2.5 MG: 2.5 SOLUTION RESPIRATORY (INHALATION) at 11:26

## 2023-08-22 RX ADMIN — ALBUTEROL SULFATE 2.5 MG: 2.5 SOLUTION RESPIRATORY (INHALATION) at 09:00

## 2023-08-22 RX ADMIN — ENOXAPARIN SODIUM 40 MG: 100 INJECTION SUBCUTANEOUS at 10:25

## 2023-08-22 RX ADMIN — PIRFENIDONE 1 TABLET: 267 TABLET, FILM COATED ORAL at 16:34

## 2023-08-22 NOTE — CONSULTS
Reason for referral and CC: SOB, ILD    HISTORY OF PRESENT ILLNESS: 75-year-old female with a history of progressive pulmonary fibrosis and severe chronic hypoxia and for shortness of breath last night. She says started suddenly. Today she feels like she is at her baseline respiratory status. She did note production of green and yellow sputum yesterday that was more than normal but it has decreased today. Her procalcitonin is normal.  No fever. She is using close to her baseline amount of oxygen at this time. Pt used bipap overnight    Past Medical History:   Diagnosis Date    A-fib ([address])     Acute cystitis without hematuria     Anxiety     CHF (congestive heart failure) (HCC)     COPD (chronic obstructive pulmonary disease) ([address])     Cryptogenic organizing pneumonia ([address])     Depression     Diabetes mellitus ([address])     GERD (gastroesophageal reflux disease)     Hyperlipidemia     Hypertension     On home oxygen therapy     uses O2 3L prn    Rash     Thyroid disease     hypothyroidism     Past Surgical History:   Procedure Laterality Date    ARM SURGERY Left 3/2/2020    LEFT ULNAR NERVE DECOMPRESSION AT THE ELBOW performed by Girma Franco MD at [facility] [address] N/A 6/27/2019    EBUS WF W/ANES.  performed by Daphney Forbes MD at [address] [place] [address]  6/27/2019    BRONCHOSCOPY/TRANSBRONCHIAL NEEDLE BIOPSY performed by Daphney Forbes MD at [address] [place] [address]  6/27/2019    BRONCHOSCOPY/TRANSBRONCHIAL LUNG BIOPSY performed by Daphney Forbes MD at [address] [place] [address]  6/27/2019    BRONCHOSCOPY ALVEOLAR LAVAGE performed by Daphney Forbes MD at [address] [place] [address]  07/10/2019    BRONCHOSCOPY N/A 7/10/2019    BRONCHOSCOPY ALVEOLAR LAVAGE performed by Mackenzie Peña MD at [address] [place] [address] Bilateral 08/06/2019    BRONCHOSCOPY N/A 8/6/2019    BRONCHOSCOPY ALVEOLAR LAVAGE performed by Fili High MD at [address] [place] [address]

## 2023-08-22 NOTE — PROGRESS NOTES
08/21/23 2111   NIV Type   $NIV $Daily Charge   NIV Started/Stopped (S)  On   Equipment Type v60   Mode Bilevel   Mask Type Full face mask   Mask Size Small   Settings/Measurements   IPAP 16 cmH20   CPAP/EPAP 8 cmH2O   Vt (Measured) 645 mL   Rate Ordered 14   FiO2  65 %   I Time/ I Time % 1 s   Minute Volume (L/min) 12.8 Liters   Mask Leak (lpm) 4 lpm   Patient's Home Machine No   Alarm Settings   Alarms On Y   Low Pressure (cmH2O) 6 cmH2O   High Pressure (cmH2O) 30 cmH2O   Delay Alarm 20 sec(s)   RR Low (bpm) 14   RR High (bpm) 50 br/min

## 2023-08-22 NOTE — PROGRESS NOTES
08/22/23 0241   NIV Type   $NIV $Daily Charge   NIV Started/Stopped On   Equipment Type v60   Mode Bilevel   Mask Type Full face mask   Mask Size Small   Settings/Measurements   IPAP 16 cmH20   CPAP/EPAP 8 cmH2O   Vt (Measured) 619 mL   Rate Ordered 14   FiO2  65 %   I Time/ I Time % 1 s   Minute Volume (L/min) 13 Liters   Mask Leak (lpm) 30 lpm   Patient's Home Machine No

## 2023-08-22 NOTE — PROGRESS NOTES
Report given to [address] [place] [address]. Pt stable, care transferred at this time.  Ashli Whitlock RN

## 2023-08-22 NOTE — ACP (ADVANCE CARE PLANNING)
Advance Care Planning     General Advance Care Planning (ACP) Conversation    Date of Conversation: 8/21/2023  Conducted with: Patient with Decision Making Capacity    Healthcare Decision Maker:    Primary Decision Maker: Trina Ward - Spouse - [phone]    Secondary Decision Maker: Obinna Loera - Child - [phone]  Click here to complete Healthcare Decision Makers including selection of the Healthcare Decision Maker Relationship (ie \"Primary\"). Today we documented Decision Maker(s) consistent with ACP documents on file. Content/Action Overview:   Has ACP document(s) on file - reflects the patient's care preferences  Reviewed DNR/DNI and patient elects Full Code (Attempt Resuscitation)        Length of Voluntary ACP Conversation in minutes:  <16 minutes (Non-Billable)    Jeb Fleischer, RN

## 2023-08-22 NOTE — PROGRESS NOTES
Patient admitted to room 328 from [facility] ED. Patient oriented to room, call light, bed rails, phone, lights and bathroom. Patient instructed about the schedule of the day including: vital sign frequency, lab draws, possible tests, frequency of MD and staff rounds, daily weights, I &O's and prescribed diet. PCU Telemetry box in place, patient aware of placement and reason. Bed locked, in lowest position, side rails up 2/4, call light within reach. Recliner Assessment  Patient is not able to demonstrated the ability to move from a reclining position to an upright position within the recliner. however patient is alert, oriented and able to provide informed consent       4 Eyes Skin Assessment     NAME:  Sarina Obando  YOB: 1955  MEDICAL RECORD NUMBER:  [id number]    The patient is being assessed for  Admission    I agree that at least one RN has performed a thorough Head to Toe Skin Assessment on the patient. ALL assessment sites listed below have been assessed. Areas assessed by both nurses:    Head, Face, Ears, Shoulders, Back, Chest, Arms, Elbows, Hands, Sacrum. Buttock, Coccyx, Ischium, Legs. Feet and Heels, and Under Medical Devices    Blanchable redness to sacrum (zinc cream applied), scattered bruising. Does the Patient have a Wound?  No noted wound(s)       Amos Prevention initiated by RN: Yes  Wound Care Orders initiated by RN: No    Pressure Injury (Stage 3,4, Unstageable, DTI, NWPT, and Complex wounds) if present, place Wound referral order by RN under : Yes    New Ostomies, if present place, Ostomy referral order under : Yes     Nurse 1 eSignature: Electronically signed by Ken Rosas RN on 8/21/23 at 10:59 PM EDT    **SHARE this note so that the co-signing nurse can place an eSignature**    Nurse 2 eSignature: Electronically signed by Jorge Clark RN on 8/22/23 at 1:24 AM EDT

## 2023-08-22 NOTE — PROGRESS NOTES
Scheduled medications given per MAR. VSS 10 liter HFNC with non-rebreather PRN. A/O x4 denies any needs at this time.  Call light in reach, will monitor,

## 2023-08-22 NOTE — PROGRESS NOTES
Pt taken off of BIPAP and placed on 12LHF. Pt is 90%. Pt is not SOB or having increased WOB. Pt appears comfortable.  Continuing to monitor pt,

## 2023-08-22 NOTE — PROGRESS NOTES
AM assessment completed. Scheduled medications given per MAR. VSS 10 liter HFNC with non-rebreather at bedside for PRN use. A/O x4, patient denies any needs at this time.  Call light in reach, will monitor,

## 2023-08-22 NOTE — PROGRESS NOTES
Pt arrived from [place]. [place] on bipap spo2 75% no signs of distress or increased wob. Placed pt on  bipap 16/8 fio2 65%, Spo2 93%.

## 2023-08-23 VITALS
DIASTOLIC BLOOD PRESSURE: 59 MMHG | WEIGHT: 123.4 LBS | HEART RATE: 77 BPM | OXYGEN SATURATION: 100 % | TEMPERATURE: 98 F | BODY MASS INDEX: 21.86 KG/M2 | HEIGHT: 63 IN | SYSTOLIC BLOOD PRESSURE: 105 MMHG | RESPIRATION RATE: 20 BRPM

## 2023-08-23 LAB
ANION GAP SERPL CALCULATED.3IONS-SCNC: 10 MMOL/L (ref 3–16)
BASOPHILS # BLD: 0.1 K/UL (ref 0–0.2)
BASOPHILS NFR BLD: 0.4 %
BUN SERPL-MCNC: 20 MG/DL (ref 7–20)
CALCIUM SERPL-MCNC: 8.5 MG/DL (ref 8.3–10.6)
CHLORIDE SERPL-SCNC: 96 MMOL/L (ref 99–110)
CO2 SERPL-SCNC: 31 MMOL/L (ref 21–32)
CREAT SERPL-MCNC: 0.6 MG/DL (ref 0.6–1.2)
DEPRECATED RDW RBC AUTO: 17.2 % (ref 12.4–15.4)
EOSINOPHIL # BLD: 0.2 K/UL (ref 0–0.6)
EOSINOPHIL NFR BLD: 1.6 %
EST. AVERAGE GLUCOSE BLD GHB EST-MCNC: 119.8 MG/DL
GFR SERPLBLD CREATININE-BSD FMLA CKD-EPI: >60 ML/MIN/{1.73_M2}
GLUCOSE BLD-MCNC: 170 MG/DL (ref 70–99)
GLUCOSE BLD-MCNC: 97 MG/DL (ref 70–99)
GLUCOSE SERPL-MCNC: 140 MG/DL (ref 70–99)
HBA1C MFR BLD: 5.8 %
HCT VFR BLD AUTO: 33.3 % (ref 36–48)
HGB BLD-MCNC: 10.9 G/DL (ref 12–16)
LYMPHOCYTES # BLD: 2.9 K/UL (ref 1–5.1)
LYMPHOCYTES NFR BLD: 20.4 %
MAGNESIUM SERPL-MCNC: 2.2 MG/DL (ref 1.8–2.4)
MCH RBC QN AUTO: 27.5 PG (ref 26–34)
MCHC RBC AUTO-ENTMCNC: 32.7 G/DL (ref 31–36)
MCV RBC AUTO: 84.2 FL (ref 80–100)
MONOCYTES # BLD: 1.1 K/UL (ref 0–1.3)
MONOCYTES NFR BLD: 7.5 %
NEUTROPHILS # BLD: 10 K/UL (ref 1.7–7.7)
NEUTROPHILS NFR BLD: 70.1 %
PERFORMED ON: ABNORMAL
PERFORMED ON: NORMAL
PLATELET # BLD AUTO: 172 K/UL (ref 135–450)
PMV BLD AUTO: 7.6 FL (ref 5–10.5)
POTASSIUM SERPL-SCNC: 3.5 MMOL/L (ref 3.5–5.1)
RBC # BLD AUTO: 3.95 M/UL (ref 4–5.2)
SODIUM SERPL-SCNC: 137 MMOL/L (ref 136–145)
WBC # BLD AUTO: 14.3 K/UL (ref 4–11)

## 2023-08-23 PROCEDURE — 99233 SBSQ HOSP IP/OBS HIGH 50: CPT | Performed by: INTERNAL MEDICINE

## 2023-08-23 PROCEDURE — 83735 ASSAY OF MAGNESIUM: CPT

## 2023-08-23 PROCEDURE — 94761 N-INVAS EAR/PLS OXIMETRY MLT: CPT

## 2023-08-23 PROCEDURE — 36415 COLL VENOUS BLD VENIPUNCTURE: CPT

## 2023-08-23 PROCEDURE — 2700000000 HC OXYGEN THERAPY PER DAY

## 2023-08-23 PROCEDURE — 6370000000 HC RX 637 (ALT 250 FOR IP): Performed by: INTERNAL MEDICINE

## 2023-08-23 PROCEDURE — 85025 COMPLETE CBC W/AUTO DIFF WBC: CPT

## 2023-08-23 PROCEDURE — 99232 SBSQ HOSP IP/OBS MODERATE 35: CPT

## 2023-08-23 PROCEDURE — 94640 AIRWAY INHALATION TREATMENT: CPT

## 2023-08-23 PROCEDURE — 2580000003 HC RX 258: Performed by: INTERNAL MEDICINE

## 2023-08-23 PROCEDURE — 6360000002 HC RX W HCPCS: Performed by: INTERNAL MEDICINE

## 2023-08-23 PROCEDURE — 94660 CPAP INITIATION&MGMT: CPT

## 2023-08-23 PROCEDURE — 80048 BASIC METABOLIC PNL TOTAL CA: CPT

## 2023-08-23 RX ORDER — PREDNISONE 10 MG/1
30 TABLET ORAL DAILY
Qty: 9 TABLET | Refills: 0 | Status: SHIPPED | OUTPATIENT
Start: 2023-08-24 | End: 2023-08-27

## 2023-08-23 RX ORDER — PREDNISONE 20 MG/1
20 TABLET ORAL 2 TIMES DAILY
Qty: 10 TABLET | Refills: 0 | Status: CANCELLED | OUTPATIENT
Start: 2023-08-23 | End: 2023-08-28

## 2023-08-23 RX ADMIN — ATORVASTATIN CALCIUM 40 MG: 40 TABLET, FILM COATED ORAL at 08:29

## 2023-08-23 RX ADMIN — FERROUS SULFATE TAB 325 MG (65 MG ELEMENTAL FE) 325 MG: 325 (65 FE) TAB at 08:29

## 2023-08-23 RX ADMIN — ALBUTEROL SULFATE 2.5 MG: 2.5 SOLUTION RESPIRATORY (INHALATION) at 00:01

## 2023-08-23 RX ADMIN — POTASSIUM CHLORIDE 20 MEQ: 1500 TABLET, EXTENDED RELEASE ORAL at 08:30

## 2023-08-23 RX ADMIN — ALBUTEROL SULFATE 2.5 MG: 2.5 SOLUTION RESPIRATORY (INHALATION) at 11:15

## 2023-08-23 RX ADMIN — ACETAMINOPHEN 650 MG: 325 TABLET ORAL at 08:29

## 2023-08-23 RX ADMIN — LEVOTHYROXINE SODIUM 100 MCG: 100 TABLET ORAL at 04:41

## 2023-08-23 RX ADMIN — PREDNISONE 30 MG: 20 TABLET ORAL at 08:29

## 2023-08-23 RX ADMIN — PIRFENIDONE 1 TABLET: 267 TABLET, FILM COATED ORAL at 08:30

## 2023-08-23 RX ADMIN — SODIUM CHLORIDE, PRESERVATIVE FREE 10 ML: 5 INJECTION INTRAVENOUS at 08:45

## 2023-08-23 RX ADMIN — PANTOPRAZOLE SODIUM 40 MG: 40 TABLET, DELAYED RELEASE ORAL at 04:41

## 2023-08-23 RX ADMIN — SERTRALINE 200 MG: 100 TABLET, FILM COATED ORAL at 08:29

## 2023-08-23 RX ADMIN — ALBUTEROL SULFATE 2.5 MG: 2.5 SOLUTION RESPIRATORY (INHALATION) at 08:00

## 2023-08-23 RX ADMIN — LORAZEPAM 0.5 MG: 0.5 TABLET ORAL at 02:10

## 2023-08-23 RX ADMIN — LORAZEPAM 0.5 MG: 0.5 TABLET ORAL at 12:54

## 2023-08-23 ASSESSMENT — PAIN DESCRIPTION - LOCATION: LOCATION: HEAD

## 2023-08-23 ASSESSMENT — PAIN SCALES - GENERAL
PAINLEVEL_OUTOF10: 8
PAINLEVEL_OUTOF10: 0

## 2023-08-23 NOTE — PROGRESS NOTES
08/23/23 0002   NIV Type   NIV Started/Stopped (S)  On   Equipment Type v60   Mode Bilevel   Mask Type Full face mask   Mask Size Small   Assessment   SpO2 93 %   Settings/Measurements   IPAP 16 cmH20   CPAP/EPAP 8 cmH2O   Vt (Measured) 783 mL   Rate Ordered 14   FiO2  50 %   I Time/ I Time % 1 s   Minute Volume (L/min) 17 Liters   Mask Leak (lpm) 0 lpm   Patient's Home Machine No   Alarm Settings   Alarms On Y   Low Pressure (cmH2O) 8 cmH2O   High Pressure (cmH2O) 30 cmH2O   Delay Alarm 20 sec(s)   RR Low (bpm) 14   RR High (bpm) 50 br/min

## 2023-08-23 NOTE — PLAN OF CARE
HEART FAILURE CARE PLAN:    Comorbidities Reviewed: Yes   Patient has a past medical history of A-fib ([address]), Acute cystitis without hematuria, Anxiety, CHF (congestive heart failure) ([address]), COPD (chronic obstructive pulmonary disease) ([address]), Cryptogenic organizing pneumonia ([address]), Depression, Diabetes mellitus ([address]), GERD (gastroesophageal reflux disease), Hyperlipidemia, Hypertension, On home oxygen therapy, Rash, and Thyroid disease. ECHOCARDIOGRAM Reviewed: Yes   Patient's Ejection Fraction (EF) is greater than 40%    Weights Reviewed: Yes   Admission weight: 121 lb 6.4 oz (55.1 kg)   Wt Readings from Last 3 Encounters:   08/21/23 121 lb 6.4 oz (55.1 kg)   07/28/23 123 lb 1 oz (55.8 kg)   07/24/23 111 lb 0.4 oz (50.4 kg)     Intake & Output Reviewed: Yes     Intake/Output Summary (Last 24 hours) at 8/22/2023 2301  Last data filed at 8/22/2023 1914  Gross per 24 hour   Intake 240 ml   Output 850 ml   Net -610 ml     Medications Reviewed: Yes   SCHEDULED HOSPITAL MEDICATIONS:   Pirfenidone  1 tablet Oral TID    albuterol  2.5 mg Nebulization Q4H While awake    insulin lispro  0-4 Units SubCUTAneous TID WC    insulin lispro  0-4 Units SubCUTAneous Nightly    [START ON 8/23/2023] predniSONE  30 mg Oral Daily    potassium chloride  20 mEq Oral Daily    furosemide  40 mg Oral Every Other Day    sertraline  200 mg Oral Daily    levothyroxine  100 mcg Oral Daily    ferrous sulfate  325 mg Oral Daily with breakfast    nadolol  20 mg Oral Daily    dilTIAZem  240 mg Oral Daily    atorvastatin  40 mg Oral Daily    pantoprazole  40 mg Oral QAM AC    sodium chloride flush  5-40 mL IntraVENous 2 times per day    enoxaparin  40 mg SubCUTAneous Daily     ACE/ARB/ARNI is REQUIRED for EF </= 03% SYSTOLIC FAILURE:   ACE[de-identified] None  ARB[de-identified] None  ARNI[de-identified] None    Evidenced-Based Beta Blocker is REQUIRED for EF </= 26% SYSTOLIC FAILURE:   [de-identified] None    Diuretics:  [de-identified] Furosemide    Diet Reviewed: Yes   ADULT DIET;  Regular; 4 carb choices (60
Patient provided a COPD Educational Folder that includes the following materials:     [x]  [address]: Managing your COPD  [x]  ALA: Getting the Most Out of Medication Delivery Devices  [x]  ALA: My COPD Action Plan  [x]  Better Breathers Club: [facility]   [x]  Smoking Cessation Classes N/A  [x]  Outpatient Spiritual Care Services  [x]  Magnet: Signs of COPD    PATIENT/CAREGIVER TEACHING:   Level of patient/caregiver understanding able to:   [x] Verbalize understanding   [] Demonstrate understanding       [] Teach back        [] Needs reinforcement     []  Other:     Electronically signed by Timi Hernandez RN on 8/23/2023 at 12:44 PM
Problem: Discharge Planning  Goal: Discharge to home or other facility with appropriate resources  8/23/2023 1213 by Marcial Alonso RN  Outcome: Completed  8/23/2023 0846 by Marcial Alonso RN  Outcome: Progressing     Problem: Safety - Adult  Goal: Free from fall injury  8/23/2023 1213 by Marcial Alonso RN  Outcome: Completed  8/23/2023 0846 by Marcial Alonso RN  Outcome: Progressing     Problem: Skin/Tissue Integrity  Goal: Absence of new skin breakdown  Description: 1. Monitor for areas of redness and/or skin breakdown  2. Assess vascular access sites hourly  3. Every 4-6 hours minimum:  Change oxygen saturation probe site  4. Every 4-6 hours:  If on nasal continuous positive airway pressure, respiratory therapy assess nares and determine need for appliance change or resting period.   8/23/2023 1213 by Marcial Alonso RN  Outcome: Completed  8/23/2023 0846 by Marcial Alonso RN  Outcome: Progressing     Problem: Chronic Conditions and Co-morbidities  Goal: Patient's chronic conditions and co-morbidity symptoms are monitored and maintained or improved  8/23/2023 1213 by Marcial Alonso RN  Outcome: Completed  8/23/2023 0846 by Marcial Alonso RN  Outcome: Progressing     Problem: Respiratory - Adult  Goal: Achieves optimal ventilation and oxygenation  8/23/2023 1213 by Marcial Alonso RN  Outcome: Completed  8/23/2023 0846 by Marcial Alonso RN  Outcome: Progressing     Problem: Pain  Goal: Verbalizes/displays adequate comfort level or baseline comfort level  8/23/2023 1213 by Marcial Alonso RN  Outcome: Completed  8/23/2023 0846 by Marcial Alonso RN  Outcome: Progressing
Problem: Discharge Planning  Goal: Discharge to home or other facility with appropriate resources  Outcome: Progressing     Problem: Safety - Adult  Goal: Free from fall injury  Outcome: Progressing     Problem: Chronic Conditions and Co-morbidities  Goal: Patient's chronic conditions and co-morbidity symptoms are monitored and maintained or improved  Outcome: Progressing     Problem: Respiratory - Adult  Goal: Achieves optimal ventilation and oxygenation  Outcome: Progressing
Problem: Discharge Planning  Goal: Discharge to home or other facility with appropriate resources  Outcome: Progressing     Problem: Safety - Adult  Goal: Free from fall injury  Outcome: Progressing     Problem: Skin/Tissue Integrity  Goal: Absence of new skin breakdown  Description: 1. Monitor for areas of redness and/or skin breakdown  2. Assess vascular access sites hourly  3. Every 4-6 hours minimum:  Change oxygen saturation probe site  4. Every 4-6 hours:  If on nasal continuous positive airway pressure, respiratory therapy assess nares and determine need for appliance change or resting period.   Outcome: Progressing     Problem: Chronic Conditions and Co-morbidities  Goal: Patient's chronic conditions and co-morbidity symptoms are monitored and maintained or improved  Outcome: Progressing     Problem: Respiratory - Adult  Goal: Achieves optimal ventilation and oxygenation  Outcome: Progressing     Problem: Pain  Goal: Verbalizes/displays adequate comfort level or baseline comfort level  Outcome: Progressing
Recurrent admissions with severe  ILD   Uses 8 L at home , now on 10  Unsure if using home bipap   Resume bipap and monitor
comfortable prior to discharge.

## 2023-08-23 NOTE — PROGRESS NOTES
Report given to [facility] RN. Pt stable, care transferred at this time.  Isadora Avina RN

## 2023-08-23 NOTE — DISCHARGE INSTR - COC
Continuity of Care Form    Patient Name: Abby Del Angel   :  1955  MRN:  [id number]    Admit date:  2023  Discharge date:  ***    Code Status Order: Full Code   Advance Directives:     Admitting Physician:  Yoana Rosado MD  PCP: Malina Gray MD    Discharging Nurse: [facility] Unit/Room#: /8856-44  Discharging Unit Phone Number: ***    Emergency Contact:   Extended Emergency Contact Information  Primary Emergency Contact: [facility]  Address: [facility] [address] Neeraj Siddiqui Phone: [phone]  Mobile Phone: [phone]  Relation: Spouse  Secondary Emergency Contact: Bhakti Blum  Address: [address] [place] [address], [address] [place] [address] Phone: [phone]  Mobile Phone: [phone]  Relation: Child    Past Surgical History:  Past Surgical History:   Procedure Laterality Date    ARM SURGERY Left 3/2/2020    LEFT ULNAR NERVE DECOMPRESSION AT THE ELBOW performed by Amol Prieto MD at [address] [place] [address] 2019    EBUS WF W/ANES.  performed by Cayla Orourke MD at [address] [place] [address]  2019    BRONCHOSCOPY/TRANSBRONCHIAL NEEDLE BIOPSY performed by Cayla Orourke MD at [address] [place] [address]  2019    BRONCHOSCOPY/TRANSBRONCHIAL LUNG BIOPSY performed by Cayla Orourke MD at [address] [place] [address]  2019    BRONCHOSCOPY ALVEOLAR LAVAGE performed by Cayla Orourke MD at [address] [place] [address]  07/10/2019    BRONCHOSCOPY N/A 7/10/2019    BRONCHOSCOPY ALVEOLAR LAVAGE performed by Tuan Roy MD at [address] [place] [address] Bilateral 2019    BRONCHOSCOPY N/A 2019    BRONCHOSCOPY ALVEOLAR LAVAGE performed by Kaiser Tejada MD at [address] [place] [address] N/A 2019    BRONCHOSCOPY ALVEOLAR LAVAGE performed by Viji Sanz MD at [address] [place] [address] N/A 2022

## 2023-08-23 NOTE — PROGRESS NOTES
Patient educated on discharge instructions as well as new medications use, dosage, administration and possible side effects. Patient verified knowledge. IV removed without difficulty and dry dressing in place. Telemetry monitor removed and returned to [facility]. Pt left facility in stable condition to Home with all of their personal belongings.

## 2023-08-23 NOTE — DISCHARGE SUMMARY
Name:  Jamir Alexander  Room:  /6438-22  MRN:    [id number]    Discharge Summary      This discharge summary is in conjunction with a complete physical exam done on the day of discharge. Discharging Physician: Dr. Tinoco New: 8/21/2023  Discharge:  8/23/2023    HPI taken from admission H&P:    Terrance Rodriguez is a 77 y.o. female with pmh of chronic respiratory failure with hypercapnia and  hypoxia , A fib who presents with acute shortness of breath , patient was on her usual state of health on 8L HFNC and states that she is typically 88-90%. Believe she had a panic attack when she was by her self at home and heard a voice outside and was worried that there might be an intruder in the house , she was unable to catch her breath afterward even after taking her anxiety medications . She is adherent to medication and follow up , was suppose to be on AVAPS at home she just received the device but was not able to use it . Denied fever cough or SOB , no contact with sick person . Diagnoses this Admission and Hospital Course     Acute on Chronic Respiratory Failure w/Hypoxia and Hypercapnia 2/2 end-stage interstitial lung disease  - hx of multiple hospital admissions -> several prior discussions about palliative care and hospice have not been helpful. She has refused all this. -  pt on 6L- 8L O2 NC at baseline  - alternates between NC and NRB at home   - tachypnea, dyspnea  - supp O2, wean as tolerated, currently on 10L, BIPAP prn   - sputum cx ordered  - continue IV solumedrol and inhaled bronchodilators   -mucinex  -on perfinidone   -pulmonology consulted , will defer antibiotics to them --> no antibiotics   -ok to dc home with 3 days prednisone   -stable on baseline oxygen      Leukocytosis   Likely 2/2 to steroids      Chronic diastolic CHF  - no AE  - Continue Lasix     Chronic anxiety and depression    - Continue home regimen including Zoloft, Ativan and Atarax.      Paroxysmal

## 2023-08-23 NOTE — CARE COORDINATION
DISCHARGE ORDER  Date/Time 2023 11:31 AM  Completed by: Indira Nix RN, Case Management    Patient Name: Melvi Duque      : 1955  Admitting Diagnosis: Acute respiratory failure ([address]) [J96.00]  Acute on chronic respiratory failure with hypoxia ([address]) [J96.21]      Admit order Date and Status:ip 23  (verify MD's last order for status of admission)      Noted discharge order. If applicable PT/OT recommendation at Discharge: na  DME recommendation by PT/OT:na  Confirmed discharge plan  (): Yes  with whom______juan_________  If pt confirmed DC plan does family need to be contacted by CM No    Discharge Plan: met with pt at bedside. Pt is agreeable to current DC plan. Pt will DC home with spouse and palliative care. Pt has O2 and family will bring in tank. No additional DC or DME needs identified. Date of Last IMM Given: 23    Reviewed chart. Role of discharge planner explained and patient verbalized understanding. Discharge order is noted. Has Home O2 in place on admit:  Yes  Informed of need to bring portable home O2 tank on day of discharge for nursing to connect prior to leaving:   Yes  Verbalized agreement/Understanding:   Yes  Pt is being d/c'd to home today. Pt's O2 sats are 99% on 8 liters. Discharge timeout done with rn, cm, pt. All discharge needs and concerns addressed.
assistance at DC: Yes  Would you like Case Management to discuss the discharge plan with any other family members/significant others, and if so, who? Yes (spouse)  Plans to Return to Present Housing: Yes  Other Identified Issues/Barriers to RETURNING to current housing: na  Potential Assistance needed at discharge: N/A            Potential DME:    Patient expects to discharge to: [address] for transportation at discharge:      Financial    Payor: [address] [place] [address] / Plan: Jose Acorn / Product Type: *No Product type* /     Does insurance require precert for SNF: Yes    Potential assistance Purchasing Medications: No  Meds-to-Beds request: Yes      [id number] [place] [place] - [address] - P [phone] - F [phone]  [address] [facility] [address] [place] [address]  Phone: [phone] Fax: [phone]    [id number]- [place], [facility],[address]  [address] [place] [address]  Phone: [phone] Fax: [phone]    [address] [place] [address] [phone] [place] [phone]  [address] [place] [address]  Phone: [phone] Fax: [phone]    [address] [phone] - F [phone]  [address] [facility]  [address]   Phone: [phone] Fax: [phone]    [address] [place] [address]. Nelsy Jay [phone] - F [phone]  [address] [facility]. [address] [facility],[address] [facility]  Phone: [phone] Fax: [phone]      Notes:    Factors facilitating achievement of predicted outcomes: Family support, Cooperative, and Pleasant    Barriers to discharge: increased o2 demand    Additional Case Management Notes: Reviewed chart and met with pt at bedside. Pt is mostly IPTA and lives at home with spouse. Pt has home O2 via addy (8-10 liters).  Pt has [facility]

## 2023-08-24 ENCOUNTER — CARE COORDINATION (OUTPATIENT)
Dept: CASE MANAGEMENT | Age: 68
End: 2023-08-24

## 2023-08-24 LAB
BACTERIA SPEC RESP CULT: NORMAL
GRAM STN SPEC: NORMAL

## 2023-08-24 NOTE — CARE COORDINATION
[address] [facility] 250 Care Transitions Initial Follow Up Call    Call within 2 business days of discharge: Yes      Patient: Melvi Duque Patient : 1955   MRN: [id number]  Reason for Admission: 2 day readmission -> acute on chronic resp failure w/ hypoxia and hypercapnia 2/2/ end stage ILD on 6L-8L O2 alternating between NC and NRB at home at baseline, leukocytosis, chronic dCHF-no AE, chronic anxiety and depression, paroxysmal A fib not on AC 2/2 anemia and epistaxis, chronic anemia, DM2, HTN, hypothyroidism -> home with family, no new services, active with palliative care ([facility]?)  Discharge Date: 23 RARS: Readmission Risk Score: 39.5      Last Discharge [address] [facility],[address] [facility]       Date Complaint Diagnosis Description Type Department Provider    23 Shortness of Breath Acute on chronic respiratory failure with hypoxia [facility]) ED to Hosp-Admission (Discharged) (ADMITTED) [facility] PCU Alisa Angeles MD; Keyana Mccray . ..     1st attempt - CTN attempted follow-up outreach to patient. Message left including CTN contact information. Follow Up  No future appointments.     John Arango RN  Care Transition Nurse  [phone] mobile

## 2023-08-25 ENCOUNTER — CARE COORDINATION (OUTPATIENT)
Dept: CASE MANAGEMENT | Age: 68
End: 2023-08-25

## 2023-08-25 ENCOUNTER — TELEPHONE (OUTPATIENT)
Dept: PULMONOLOGY | Age: 68
End: 2023-08-25

## 2023-08-25 DIAGNOSIS — J96.21 ACUTE ON CHRONIC RESPIRATORY FAILURE WITH HYPOXIA AND HYPERCAPNIA (HCC): Primary | ICD-10-CM

## 2023-08-25 DIAGNOSIS — J96.22 ACUTE ON CHRONIC RESPIRATORY FAILURE WITH HYPOXIA AND HYPERCAPNIA (HCC): Primary | ICD-10-CM

## 2023-08-25 PROCEDURE — 1111F DSCHRG MED/CURRENT MED MERGE: CPT | Performed by: FAMILY MEDICINE

## 2023-08-25 NOTE — CARE COORDINATION
[facility] Care Transitions Initial Follow Up Call    Call within 2 business days of discharge: Yes    Patient Current Location: Home: [address] [place] [address]    Care Transition Nurse contacted the patient by telephone to perform post hospital discharge assessment. Provided introduction to self, and explanation of the Care Transition Nurse role. Patient: Rodger Obando Patient : 1955   MRN: [id number]  Reason for Admission: 2 day readmission -> acute on chronic resp failure w/ hypoxia and hypercapnia 2/2/ end stage ILD on 6L-8L O2 alternating between NC and NRB at home at baseline, leukocytosis, chronic dCHF-no AE, chronic anxiety and depression, paroxysmal A fib not on AC 2/2 anemia and epistaxis, chronic anemia, DM2, HTN, hypothyroidism -> home with family, no new services, active with [facility] (next visit 23)  Discharge Date: 23 RARS: Readmission Risk Score: 39.5      Last Discharge [address] [facility],[address] [facility]       Date Complaint Diagnosis Description Type Department Provider    23 Shortness of Breath Acute on chronic respiratory failure with hypoxia [facility]) ED to Hosp-Admission (Discharged) (ADMITTED) [facility]; Peggy Jansen . .. Challenges to be reviewed by the provider   Additional needs identified to be addressed with provider: No         Method of communication with provider: none. 0910-Left message for daughter. 0918-Reached patient at her number. She reports she feels well today. She is wearing O2 at 10L max. States she did not tolerate weaning yesterday. She is able to complete full sentences and is in no distress in conversation. States she is in need of a lift chair/recliner. Referral will be made to  and patient asks she contact daughter Gretta Ramirez who works during the day but will call back if unable to answer. She plans to schedule vv with her PCP and the [facility] provider visits in the home next Thursday.  She states the [facility] [place] isn't

## 2023-08-25 NOTE — CARE COORDINATION
Care Transition Update:     Patient's daughter Annie Brewer (okay per HIPAA on file) returned call. CTN updated her to process for lift chair. She states Gurwinder HURTADO has already contacted them and Marvel Ogden NP will be out to visit next Tuesday, 8/29/23. Annie Brewer plans to reach out to [facility] to also see what process to get lift chair covered possibly through that program.     CTN will follow up next week. She has a new shift [phone] so not as reachable during the daytime hours.      Maridee Lesch, RN  Care Transition Nurse  [phone] mobile

## 2023-08-25 NOTE — TELEPHONE ENCOUNTER
RT from [facility] FX#[phone] called states patient is having issues with O2 sats running @ 85% on ventAVAPS   IPAP max 20  IPAP min 10  EPAP 4  RR 12  TV 6 ml per kg of IBW  8LPM O2      States seem to have issues with catching her breath on 8LPM O2 states using non-rebreather and nasal cannula.       Checking if pressures need adjusted

## 2023-08-25 NOTE — CARE COORDINATION
Care Transition update:     Received return call from nurse Dylan at [facility], returning call for SW. States next scheduled visit with Daksha Marrero NP at [facility] is not until 9/14/23. They have only seen her once in the home and that was on 8/8/23. She will ask Daksha Marrero NP if she has availability to see her sooner. States their process for getting lift chair in the home is the same as noted but with her having the waiver she may have potential for better coverage. She noted CTN contact info for updates. They will contact patient to schedule sooner if NP schedule allows. Patient updated and voices understanding. She will also look into purchase options on sites like [address] [place] [address] in the meantime. Currently she sits in a wheelchair most of the time which is not very comfortable. Estephania Hurst, RN  Care Transition Nurse  [phone] mobile    No future appointments.   Non [address] [facility],[address] appointments: Daksha Marrero NP at [facility] 9/14/2023

## 2023-08-26 PROBLEM — R79.89 ELEVATED TROPONIN: Status: RESOLVED | Noted: 2023-06-26 | Resolved: 2023-08-26

## 2023-08-26 PROBLEM — R77.8 ELEVATED TROPONIN: Status: RESOLVED | Noted: 2023-06-26 | Resolved: 2023-08-26

## 2023-08-28 NOTE — TELEPHONE ENCOUNTER
RT Jose Wilson from [facility] called back stating that he is sneind a DWO for Dr. Monserrat Willingham to sign. Jose Wilson states that he adjusted Tidal volume to 5-14 and turned on AutoEPAP for pt comfort.

## 2023-08-28 NOTE — TELEPHONE ENCOUNTER
LM asking Kayley to CB. Spoke with Quincy Perez whom states pt seems to be doing okay. I did inform her on Dr Cherry Davis's recommendation she verbalized understanding. States she will CB with any issues.

## 2023-09-01 ENCOUNTER — CARE COORDINATION (OUTPATIENT)
Dept: CASE MANAGEMENT | Age: 68
End: 2023-09-01

## 2023-09-01 NOTE — CARE COORDINATION
[address] [facility] Care Transitions Follow Up Call      Patient: Rudolph Reid  Patient : 1955   MRN: [id number]  Reason for Admission: 2 day readmission -> acute on chronic resp failure w/ hypoxia and hypercapnia 2/2/ end stage ILD on 6L-8L O2 alternating between NC and NRB at home at baseline, leukocytosis, chronic dCHF-no AE, chronic anxiety and depression, paroxysmal A fib not on AC 2/2 anemia and epistaxis, chronic anemia, DM2, HTN, hypothyroidism -> home with family, no new services, active with [facility] (next visit 23)  Discharge Date: 23 RARS: Readmission Risk Score: 39.5    CTN attempted follow-up outreach to patient. Message left including CTN contact information.      Katrin Gimenez RN  Care Transition Nurse  [phone] mobile

## 2023-09-06 DIAGNOSIS — J84.9 ILD (INTERSTITIAL LUNG DISEASE) (HCC): ICD-10-CM

## 2023-09-06 DIAGNOSIS — R06.02 SOB (SHORTNESS OF BREATH): Primary | ICD-10-CM

## 2023-09-06 DIAGNOSIS — Z51.81 THERAPEUTIC DRUG MONITORING: ICD-10-CM

## 2023-09-06 DIAGNOSIS — J96.11 CHRONIC HYPOXEMIC RESPIRATORY FAILURE (HCC): ICD-10-CM

## 2023-09-07 ENCOUNTER — CARE COORDINATION (OUTPATIENT)
Dept: CASE MANAGEMENT | Age: 68
End: 2023-09-07

## 2023-09-07 NOTE — CARE COORDINATION
[facility] [place] Transitions Follow Up Call      Patient: Rhys Freitas  Patient : 1955   MRN: [id number]  Reason for Admission: 2 day readmission -> acute on chronic resp failure w/ hypoxia and hypercapnia 2/2/ end stage ILD on 6L-8L O2 alternating between NC and NRB at home at baseline, leukocytosis, chronic dCHF-no AE, chronic anxiety and depression, paroxysmal A fib not on AC 2/2 anemia and epistaxis, chronic anemia, DM2, HTN, hypothyroidism -> home with family, no new services, active with [facility] (visited )  Discharge Date: 23 RARS: Readmission Risk Score: 39.5    CTN attempted follow-up outreach to patient. Message left including CTN contact information. No further CTN outreach scheduled at this time. Follow Up  No future appointments.   Non-[facility] follow up appointment(s): Maverick Hoffmann, RN  Care Transition Nurse  [phone] mobile

## 2023-09-08 RX ORDER — PREDNISONE 10 MG/1
TABLET ORAL
Qty: 30 TABLET | Refills: 0 | Status: SHIPPED | OUTPATIENT
Start: 2023-09-08

## 2023-09-18 NOTE — TELEPHONE ENCOUNTER
Ranjana Ansari routed conversation to [facility] 3 minutes ago (11:06 AM)     Ranjana Ansari 3 minutes ago (11:06 AM)     AH  Pt called stating she is having trouble using her cpap, she called [facility] for advice and they stated Dr. Ange More cancelled use of her machine. PT states she wants to use her machine and just needs help learning how to use it. Please advise. 18-Sep-2023

## 2023-09-24 ENCOUNTER — APPOINTMENT (OUTPATIENT)
Dept: CT IMAGING | Age: 68
DRG: 177 | End: 2023-09-24
Payer: MEDICARE

## 2023-09-24 ENCOUNTER — APPOINTMENT (OUTPATIENT)
Dept: GENERAL RADIOLOGY | Age: 68
DRG: 177 | End: 2023-09-24
Payer: MEDICARE

## 2023-09-24 ENCOUNTER — HOSPITAL ENCOUNTER (EMERGENCY)
Age: 68
Discharge: HOME | DRG: 177 | End: 2023-09-24
Attending: EMERGENCY MEDICINE
Payer: MEDICARE

## 2023-09-24 VITALS
HEIGHT: 63 IN | HEART RATE: 99 BPM | RESPIRATION RATE: 18 BRPM | DIASTOLIC BLOOD PRESSURE: 82 MMHG | TEMPERATURE: 98.7 F | OXYGEN SATURATION: 93 % | WEIGHT: 124 LBS | BODY MASS INDEX: 21.97 KG/M2 | SYSTOLIC BLOOD PRESSURE: 106 MMHG

## 2023-09-24 DIAGNOSIS — J96.11 CHRONIC RESPIRATORY FAILURE WITH HYPOXIA (HCC): Primary | ICD-10-CM

## 2023-09-24 DIAGNOSIS — G89.29 CHRONIC MIDLINE THORACIC BACK PAIN: ICD-10-CM

## 2023-09-24 DIAGNOSIS — M54.6 CHRONIC MIDLINE THORACIC BACK PAIN: ICD-10-CM

## 2023-09-24 LAB
ALBUMIN SERPL-MCNC: 4.3 G/DL (ref 3.4–5)
ALBUMIN/GLOB SERPL: 2 {RATIO} (ref 1.1–2.2)
ALP SERPL-CCNC: 98 U/L (ref 40–129)
ALT SERPL-CCNC: 27 U/L (ref 10–40)
ANION GAP SERPL CALCULATED.3IONS-SCNC: 14 MMOL/L (ref 3–16)
APTT BLD: 20.6 SEC (ref 22.7–35.9)
AST SERPL-CCNC: 35 U/L (ref 15–37)
BASE EXCESS BLDV CALC-SCNC: 4.2 MMOL/L (ref -3–3)
BASOPHILS # BLD: 0.1 K/UL (ref 0–0.2)
BASOPHILS NFR BLD: 0.4 %
BILIRUB SERPL-MCNC: 0.5 MG/DL (ref 0–1)
BUN SERPL-MCNC: 15 MG/DL (ref 7–20)
CALCIUM SERPL-MCNC: 9.1 MG/DL (ref 8.3–10.6)
CHLORIDE SERPL-SCNC: 98 MMOL/L (ref 99–110)
CO2 BLDV-SCNC: 30 MMOL/L
CO2 SERPL-SCNC: 28 MMOL/L (ref 21–32)
COHGB MFR BLDV: 4.6 % (ref 0–1.5)
CREAT SERPL-MCNC: <0.5 MG/DL (ref 0.6–1.2)
DEPRECATED RDW RBC AUTO: 14.5 % (ref 12.4–15.4)
EOSINOPHIL # BLD: 0.3 K/UL (ref 0–0.6)
EOSINOPHIL NFR BLD: 2.7 %
GFR SERPLBLD CREATININE-BSD FMLA CKD-EPI: >60 ML/MIN/{1.73_M2}
GLUCOSE SERPL-MCNC: 160 MG/DL (ref 70–99)
HCO3 BLDV-SCNC: 28.6 MMOL/L (ref 23–29)
HCT VFR BLD AUTO: 38.5 % (ref 36–48)
HGB BLD-MCNC: 12.5 G/DL (ref 12–16)
INR PPP: 1.05 (ref 0.84–1.16)
LACTATE BLDV-SCNC: 2 MMOL/L (ref 0.4–1.9)
LYMPHOCYTES # BLD: 0.6 K/UL (ref 1–5.1)
LYMPHOCYTES NFR BLD: 4.9 %
MCH RBC QN AUTO: 27 PG (ref 26–34)
MCHC RBC AUTO-ENTMCNC: 32.5 G/DL (ref 31–36)
MCV RBC AUTO: 83.1 FL (ref 80–100)
METHGB MFR BLDV: 0.3 %
MONOCYTES # BLD: 0.5 K/UL (ref 0–1.3)
MONOCYTES NFR BLD: 4 %
NEUTROPHILS # BLD: 11.2 K/UL (ref 1.7–7.7)
NEUTROPHILS NFR BLD: 88 %
NT-PROBNP SERPL-MCNC: 1175 PG/ML (ref 0–124)
O2 THERAPY: ABNORMAL
PCO2 BLDV: 42 MMHG (ref 40–50)
PH BLDV: 7.45 [PH] (ref 7.35–7.45)
PLATELET # BLD AUTO: 140 K/UL (ref 135–450)
PMV BLD AUTO: 7.8 FL (ref 5–10.5)
PO2 BLDV: 33.7 MMHG (ref 25–40)
POTASSIUM SERPL-SCNC: 3.8 MMOL/L (ref 3.5–5.1)
PROT SERPL-MCNC: 6.5 G/DL (ref 6.4–8.2)
PROTHROMBIN TIME: 13.7 SEC (ref 11.5–14.8)
RBC # BLD AUTO: 4.64 M/UL (ref 4–5.2)
SAO2 % BLDV: 68 %
SODIUM SERPL-SCNC: 140 MMOL/L (ref 136–145)
TROPONIN, HIGH SENSITIVITY: 18 NG/L (ref 0–14)
WBC # BLD AUTO: 12.7 K/UL (ref 4–11)

## 2023-09-24 PROCEDURE — 85025 COMPLETE CBC W/AUTO DIFF WBC: CPT

## 2023-09-24 PROCEDURE — 99285 EMERGENCY DEPT VISIT HI MDM: CPT

## 2023-09-24 PROCEDURE — 71045 X-RAY EXAM CHEST 1 VIEW: CPT

## 2023-09-24 PROCEDURE — 80053 COMPREHEN METABOLIC PANEL: CPT

## 2023-09-24 PROCEDURE — 82803 BLOOD GASES ANY COMBINATION: CPT

## 2023-09-24 PROCEDURE — 83605 ASSAY OF LACTIC ACID: CPT

## 2023-09-24 PROCEDURE — 84484 ASSAY OF TROPONIN QUANT: CPT

## 2023-09-24 PROCEDURE — 6370000000 HC RX 637 (ALT 250 FOR IP): Performed by: EMERGENCY MEDICINE

## 2023-09-24 PROCEDURE — 83880 ASSAY OF NATRIURETIC PEPTIDE: CPT

## 2023-09-24 PROCEDURE — 72128 CT CHEST SPINE W/O DYE: CPT

## 2023-09-24 PROCEDURE — 36415 COLL VENOUS BLD VENIPUNCTURE: CPT

## 2023-09-24 PROCEDURE — 85610 PROTHROMBIN TIME: CPT

## 2023-09-24 PROCEDURE — 85730 THROMBOPLASTIN TIME PARTIAL: CPT

## 2023-09-24 PROCEDURE — 93005 ELECTROCARDIOGRAM TRACING: CPT | Performed by: EMERGENCY MEDICINE

## 2023-09-24 RX ORDER — ACETAMINOPHEN 325 MG/1
650 TABLET ORAL ONCE
Status: COMPLETED | OUTPATIENT
Start: 2023-09-24 | End: 2023-09-24

## 2023-09-24 RX ADMIN — ACETAMINOPHEN 650 MG: 325 TABLET ORAL at 14:22

## 2023-09-24 ASSESSMENT — PAIN - FUNCTIONAL ASSESSMENT
PAIN_FUNCTIONAL_ASSESSMENT: NONE - DENIES PAIN
PAIN_FUNCTIONAL_ASSESSMENT: NONE - DENIES PAIN

## 2023-09-24 NOTE — ED PROVIDER NOTES
[facility]      Pt Name: Che Horton  MRN: [id number]  [address] 1955  Date of evaluation: 9/24/2023  Provider: Maurizio Guthrie MD    CHIEF COMPLAINT       Chief Complaint   Patient presents with    Back Pain     Chronic mid back pain that has worsened over the last couple of days. Shortness of Breath         HISTORY OF PRESENT ILLNESS   (Location/Symptom, Timing/Onset, Context/Setting, Quality, Duration, Modifying Factors, Severity)  Note limiting factors. Che Horton is a 77 y.o. female with p ast medical history of hypertension, paroxysmal atrial fibrillation, CHF, diabetes, COPD with severe hypoxic respiratory failure on nonrebreather and up to 8 L nasal cannula at all times at home here today with back pain and shortness of breath. Patient presents emergency department today for back pain. Note has a history of chronic back pain has had prior thoracic and lumbar spinal surgeries and notes a sharp pain in the upper portion of her back. No new trauma or injury. No fevers or chills. Has chronic shortness of breath that she states is more or less the same. No new cough. No fevers or chills. No other chest pain. No numbness, tingling or weakness in her extremities. HPI    Nursing Notes were reviewed. REVIEW OF SYSTEMS    (2-9 systems for level 4, 10 or more for level 5)     Review of Systems    Please see HPI for pertinent positive and negative review of system findings. A full 10 system ROS was performed and otherwise negative.         PAST MEDICAL HISTORY     Past Medical History:   Diagnosis Date    A-fib [facility])     Acute cystitis without hematuria     Anxiety     CHF (congestive heart failure) (HCC)     COPD (chronic obstructive pulmonary disease) ([address])     Cryptogenic organizing pneumonia ([address])     Depression     Diabetes mellitus ([address])     GERD (gastroesophageal reflux disease)     Hyperlipidemia PANEL W/ REFLEX TO MG FOR LOW K - Abnormal; Notable for the following components:    Chloride 98 (*)     Glucose 160 (*)     Creatinine <0.5 (*)     All other components within normal limits   LACTATE, SEPSIS - Abnormal; Notable for the following components:    Lactic Acid, Sepsis 2.0 (*)     All other components within normal limits   BLOOD GAS, VENOUS - Abnormal; Notable for the following components:    pH, Jasper 7.451 (*)     Base Excess, Jasper 4.2 (*)     Carboxyhemoglobin 4.6 (*)     All other components within normal limits   TROPONIN - Abnormal; Notable for the following components:    Troponin, High Sensitivity 18 (*)     All other components within normal limits   BRAIN NATRIURETIC PEPTIDE - Abnormal; Notable for the following components:    Pro-BNP 1,175 (*)     All other components within normal limits   APTT - Abnormal; Notable for the following components:    aPTT 20.6 (*)     All other components within normal limits   PROTIME-INR       Interpretation per the Radiologist below, if obtained/available at the time of this note:    CT THORACIC SPINE WO CONTRAST   Final Result   1. No acute abnormality of the thoracic spine. 2. Postsurgical and traumatic changes in the lumbar spine are described   above, stable. 3. Advanced interstitial changes in the lungs and cardiomegaly, characterized   on a prior CT chest without significant change. XR CHEST PORTABLE   Final Result   No change in the diffuse interstitial lung disease. No acute infiltrate   identified. All other labs/imaging were within normal range or not returned as of this dictation.     EMERGENCY DEPARTMENT COURSE and DIFFERENTIAL DIAGNOSIS/MDM:   Vitals:    Vitals:    09/24/23 1446 09/24/23 1447 09/24/23 1513 09/24/23 1534   BP:   118/71 117/67   Pulse:   86 93   Resp:   20 27   Temp:       TempSrc:       SpO2: 95% 94% 95% 90%   Weight:       Height:           EKG *Interpreted by me*: Sinus rhythm rate of 91 bpm.  Left

## 2023-09-24 NOTE — ED TRIAGE NOTES
Sharp mid back pain. Pt is poor historian and unable to describe in detail her c/o. Does reports worsening SOB with back pain. Pt does state she uses 10 lpm at home.

## 2023-09-24 NOTE — ED NOTES
Dr. Swapna Gilliland states he is comfortable with patient returning home, since per  she is using her baseline O2 demand.  to transport home with home O2 tanks.      Grover Barragan RN  09/24/23 8767

## 2023-09-24 NOTE — ED NOTES
MD made aware that nursing staff unable to maintain pt O2 saturation with NRB, or HFNC. This writer recommended BiPap. MD states that she uses NRB + NC at home to maintain saturation, and that he is ok with using this method as well.       Ludmila Burroughs RN  09/24/23 7932

## 2023-09-24 NOTE — ED NOTES
Discharge instructions and medications reviewed with patient. Patient verbalized understanding of medications and follow up. All questions answered at this time. Skin warm, pink, and dry. Patient alert and oriented x4. Pt assisted to lobby via w/c and home O2 tank. Patient discharged home with 0 prescriptions.        Abraham Pandya RN  09/24/23 6683

## 2023-09-25 ENCOUNTER — HOSPITAL ENCOUNTER (INPATIENT)
Age: 68
LOS: 10 days | Discharge: NURSING, REHAB OR LONG-TERM CARE | DRG: 177 | End: 2023-10-05
Attending: STUDENT IN AN ORGANIZED HEALTH CARE EDUCATION/TRAINING PROGRAM | Admitting: INTERNAL MEDICINE
Payer: MEDICARE

## 2023-09-25 ENCOUNTER — APPOINTMENT (OUTPATIENT)
Dept: GENERAL RADIOLOGY | Age: 68
DRG: 177 | End: 2023-09-25
Payer: MEDICARE

## 2023-09-25 DIAGNOSIS — R06.00 DYSPNEA, UNSPECIFIED TYPE: Primary | ICD-10-CM

## 2023-09-25 DIAGNOSIS — J84.9 INTERSTITIAL LUNG DISEASE (HCC): ICD-10-CM

## 2023-09-25 DIAGNOSIS — R09.02 HYPOXIA: ICD-10-CM

## 2023-09-25 DIAGNOSIS — D69.6 THROMBOCYTOPENIA (HCC): ICD-10-CM

## 2023-09-25 DIAGNOSIS — F41.9 ANXIETY: ICD-10-CM

## 2023-09-25 LAB
ANION GAP SERPL CALCULATED.3IONS-SCNC: 13 MMOL/L (ref 3–16)
BASE EXCESS BLDV CALC-SCNC: 5.7 MMOL/L (ref -3–3)
BASOPHILS # BLD: 0 K/UL (ref 0–0.2)
BASOPHILS NFR BLD: 0.3 %
BUN SERPL-MCNC: 14 MG/DL (ref 7–20)
CALCIUM SERPL-MCNC: 9.1 MG/DL (ref 8.3–10.6)
CHLORIDE SERPL-SCNC: 100 MMOL/L (ref 99–110)
CO2 BLDV-SCNC: 35 MMOL/L
CO2 SERPL-SCNC: 32 MMOL/L (ref 21–32)
COHGB MFR BLDV: 2.6 % (ref 0–1.5)
CREAT SERPL-MCNC: 0.6 MG/DL (ref 0.6–1.2)
DEPRECATED RDW RBC AUTO: 14.6 % (ref 12.4–15.4)
EKG ATRIAL RATE: 91 BPM
EKG DIAGNOSIS: NORMAL
EKG P AXIS: 30 DEGREES
EKG P-R INTERVAL: 152 MS
EKG Q-T INTERVAL: 380 MS
EKG QRS DURATION: 82 MS
EKG QTC CALCULATION (BAZETT): 467 MS
EKG R AXIS: 19 DEGREES
EKG T AXIS: 1 DEGREES
EKG VENTRICULAR RATE: 91 BPM
EOSINOPHIL # BLD: 0.3 K/UL (ref 0–0.6)
EOSINOPHIL NFR BLD: 2.3 %
GFR SERPLBLD CREATININE-BSD FMLA CKD-EPI: >60 ML/MIN/{1.73_M2}
GLUCOSE SERPL-MCNC: 112 MG/DL (ref 70–99)
HCO3 BLDV-SCNC: 33.4 MMOL/L (ref 23–29)
HCT VFR BLD AUTO: 38.8 % (ref 36–48)
HGB BLD-MCNC: 12.4 G/DL (ref 12–16)
LYMPHOCYTES # BLD: 0.8 K/UL (ref 1–5.1)
LYMPHOCYTES NFR BLD: 6.9 %
MCH RBC QN AUTO: 26.7 PG (ref 26–34)
MCHC RBC AUTO-ENTMCNC: 31.9 G/DL (ref 31–36)
MCV RBC AUTO: 83.7 FL (ref 80–100)
METHGB MFR BLDV: 0.3 %
MONOCYTES # BLD: 0.9 K/UL (ref 0–1.3)
MONOCYTES NFR BLD: 8 %
NEUTROPHILS # BLD: 9.7 K/UL (ref 1.7–7.7)
NEUTROPHILS NFR BLD: 82.5 %
NT-PROBNP SERPL-MCNC: 6755 PG/ML (ref 0–124)
O2 THERAPY: ABNORMAL
PCO2 BLDV: 63.5 MMHG (ref 40–50)
PH BLDV: 7.34 [PH] (ref 7.35–7.45)
PLATELET # BLD AUTO: 133 K/UL (ref 135–450)
PMV BLD AUTO: 7.6 FL (ref 5–10.5)
PO2 BLDV: 24.5 MMHG (ref 25–40)
POTASSIUM SERPL-SCNC: 3.6 MMOL/L (ref 3.5–5.1)
RBC # BLD AUTO: 4.63 M/UL (ref 4–5.2)
SAO2 % BLDV: 39 %
SARS-COV-2 RDRP RESP QL NAA+PROBE: DETECTED
SODIUM SERPL-SCNC: 145 MMOL/L (ref 136–145)
TROPONIN, HIGH SENSITIVITY: 25 NG/L (ref 0–14)
TROPONIN, HIGH SENSITIVITY: 25 NG/L (ref 0–14)
WBC # BLD AUTO: 11.7 K/UL (ref 4–11)

## 2023-09-25 PROCEDURE — 80048 BASIC METABOLIC PNL TOTAL CA: CPT

## 2023-09-25 PROCEDURE — 2000000000 HC ICU R&B

## 2023-09-25 PROCEDURE — 82803 BLOOD GASES ANY COMBINATION: CPT

## 2023-09-25 PROCEDURE — 83880 ASSAY OF NATRIURETIC PEPTIDE: CPT

## 2023-09-25 PROCEDURE — 96374 THER/PROPH/DIAG INJ IV PUSH: CPT

## 2023-09-25 PROCEDURE — 87635 SARS-COV-2 COVID-19 AMP PRB: CPT

## 2023-09-25 PROCEDURE — 99285 EMERGENCY DEPT VISIT HI MDM: CPT

## 2023-09-25 PROCEDURE — 6360000002 HC RX W HCPCS: Performed by: STUDENT IN AN ORGANIZED HEALTH CARE EDUCATION/TRAINING PROGRAM

## 2023-09-25 PROCEDURE — 93010 ELECTROCARDIOGRAM REPORT: CPT | Performed by: INTERNAL MEDICINE

## 2023-09-25 PROCEDURE — 84484 ASSAY OF TROPONIN QUANT: CPT

## 2023-09-25 PROCEDURE — 2580000003 HC RX 258: Performed by: STUDENT IN AN ORGANIZED HEALTH CARE EDUCATION/TRAINING PROGRAM

## 2023-09-25 PROCEDURE — 36415 COLL VENOUS BLD VENIPUNCTURE: CPT

## 2023-09-25 PROCEDURE — 85025 COMPLETE CBC W/AUTO DIFF WBC: CPT

## 2023-09-25 PROCEDURE — 71045 X-RAY EXAM CHEST 1 VIEW: CPT

## 2023-09-25 PROCEDURE — 93005 ELECTROCARDIOGRAM TRACING: CPT | Performed by: STUDENT IN AN ORGANIZED HEALTH CARE EDUCATION/TRAINING PROGRAM

## 2023-09-25 RX ORDER — 0.9 % SODIUM CHLORIDE 0.9 %
500 INTRAVENOUS SOLUTION INTRAVENOUS ONCE
Status: COMPLETED | OUTPATIENT
Start: 2023-09-25 | End: 2023-09-25

## 2023-09-25 RX ORDER — POTASSIUM CHLORIDE 20 MEQ/1
20 TABLET, EXTENDED RELEASE ORAL DAILY
Qty: 90 TABLET | OUTPATIENT
Start: 2023-09-25

## 2023-09-25 RX ORDER — METHYLPREDNISOLONE SODIUM SUCCINATE 1 G/16ML
125 INJECTION, POWDER, LYOPHILIZED, FOR SOLUTION INTRAMUSCULAR; INTRAVENOUS ONCE
Status: COMPLETED | OUTPATIENT
Start: 2023-09-25 | End: 2023-09-25

## 2023-09-25 RX ADMIN — METHYLPREDNISOLONE SODIUM SUCCINATE 125 MG: 125 INJECTION INTRAMUSCULAR; INTRAVENOUS at 21:33

## 2023-09-25 RX ADMIN — SODIUM CHLORIDE 500 ML: 9 INJECTION, SOLUTION INTRAVENOUS at 21:33

## 2023-09-25 ASSESSMENT — PAIN - FUNCTIONAL ASSESSMENT
PAIN_FUNCTIONAL_ASSESSMENT: NONE - DENIES PAIN

## 2023-09-25 NOTE — ED PROVIDER NOTES
Emergency Department Attending Provider Note  Location: [place] EMERGENCY DEPARTMENT  9/25/2023     Patient Identification  Cheryle Nine is a 77 y.o. female      [address] [place], [place] [address]raven [facility]arrisWellSpan Waynes[facility] HospitalSalgado was evaluated in the Emergency Department for shortness of breath. Although initial history and physical exam information was obtained by Dr. Rudolpho Gitelman (who also dictated a record of this visit), I personally saw the patient and performed a substantive portion of the visit including all aspects of the medical decision making. Patient seen and evaluated. Relevant records reviewed. I received signout from Dr. Rudolpho Gitelman with plan to follow-up on remaining laboratory and imaging work-up in addition to disposition. Patient is a 78 y/o F with pmhx of severe COPD on 8L O2 at baseline who presents with shortness of breath. Patient has complex history of cryptogenic organizing pneumonia. Patient was evaluated yesterday for back pain and was ultimately discharged home. Patient reports that she has been trying to enroll in hospice however has been unable to do so. Of note she also required replacement and her oxygen tank which she got this morning. Patient presented tachycardic, tachypneic and hypoxic on nasal cannula. She was transition to a nonrebreather with improvement. On exam she is chronically ill-appearing rhonchorous and crackles in bilateral bases. She had no significant edema    Differential diagnosis includes but is not limited to COPD exacerbation, pneumonia, CHF, anemia, dehydration. She has no signs or symptoms concerning for dissection.     Chronic medical conditions include COPD, PAF, HTN, anxiety, hypothyroidism, HLD, CHF  Social determinants: None significant    Diagnostic studies interpreted   - Labs  CBC with leukocytosis 11.7, no evidence of anemia, thrombocytopenic 133  BMP: normal electrolytes, normal renal function, hyperglycemic 112  Troponin elevated 25  BNP elevated 6,755  VBG: mixed acidosis with pH

## 2023-09-26 PROBLEM — J44.1 COPD EXACERBATION (HCC): Status: ACTIVE | Noted: 2023-09-26

## 2023-09-26 LAB
ALBUMIN SERPL-MCNC: 3.3 G/DL (ref 3.4–5)
ALBUMIN/GLOB SERPL: 1.2 {RATIO} (ref 1.1–2.2)
ALP SERPL-CCNC: 77 U/L (ref 40–129)
ALT SERPL-CCNC: 16 U/L (ref 10–40)
ANION GAP SERPL CALCULATED.3IONS-SCNC: 13 MMOL/L (ref 3–16)
AST SERPL-CCNC: 19 U/L (ref 15–37)
BASOPHILS # BLD: 0 K/UL (ref 0–0.2)
BASOPHILS NFR BLD: 0.2 %
BILIRUB SERPL-MCNC: 0.4 MG/DL (ref 0–1)
BUN SERPL-MCNC: 16 MG/DL (ref 7–20)
CALCIUM SERPL-MCNC: 8.6 MG/DL (ref 8.3–10.6)
CHLORIDE SERPL-SCNC: 101 MMOL/L (ref 99–110)
CO2 SERPL-SCNC: 29 MMOL/L (ref 21–32)
CREAT SERPL-MCNC: <0.5 MG/DL (ref 0.6–1.2)
DEPRECATED RDW RBC AUTO: 14.3 % (ref 12.4–15.4)
EKG ATRIAL RATE: 109 BPM
EKG DIAGNOSIS: NORMAL
EKG P AXIS: 26 DEGREES
EKG P-R INTERVAL: 134 MS
EKG Q-T INTERVAL: 298 MS
EKG QRS DURATION: 86 MS
EKG QTC CALCULATION (BAZETT): 401 MS
EKG R AXIS: 80 DEGREES
EKG T AXIS: -5 DEGREES
EKG VENTRICULAR RATE: 109 BPM
EOSINOPHIL # BLD: 0 K/UL (ref 0–0.6)
EOSINOPHIL NFR BLD: 0 %
GFR SERPLBLD CREATININE-BSD FMLA CKD-EPI: >60 ML/MIN/{1.73_M2}
GLUCOSE BLD-MCNC: 100 MG/DL (ref 70–99)
GLUCOSE BLD-MCNC: 104 MG/DL (ref 70–99)
GLUCOSE BLD-MCNC: 113 MG/DL (ref 70–99)
GLUCOSE BLD-MCNC: 116 MG/DL (ref 70–99)
GLUCOSE BLD-MCNC: 162 MG/DL (ref 70–99)
GLUCOSE BLD-MCNC: 84 MG/DL (ref 70–99)
GLUCOSE SERPL-MCNC: 110 MG/DL (ref 70–99)
HCT VFR BLD AUTO: 34.3 % (ref 36–48)
HGB BLD-MCNC: 11.2 G/DL (ref 12–16)
LDH SERPL L TO P-CCNC: 249 U/L (ref 100–190)
LYMPHOCYTES # BLD: 0.4 K/UL (ref 1–5.1)
LYMPHOCYTES NFR BLD: 4.4 %
MCH RBC QN AUTO: 27.3 PG (ref 26–34)
MCHC RBC AUTO-ENTMCNC: 32.7 G/DL (ref 31–36)
MCV RBC AUTO: 83.5 FL (ref 80–100)
MONOCYTES # BLD: 0.1 K/UL (ref 0–1.3)
MONOCYTES NFR BLD: 1.3 %
NEUTROPHILS # BLD: 7.7 K/UL (ref 1.7–7.7)
NEUTROPHILS NFR BLD: 94.1 %
ORGANISM: ABNORMAL
PERFORMED ON: ABNORMAL
PERFORMED ON: NORMAL
PLATELET # BLD AUTO: 137 K/UL (ref 135–450)
PMV BLD AUTO: 7.8 FL (ref 5–10.5)
POTASSIUM SERPL-SCNC: 3.6 MMOL/L (ref 3.5–5.1)
PROCALCITONIN SERPL IA-MCNC: 0.21 NG/ML (ref 0–0.15)
PROT SERPL-MCNC: 6 G/DL (ref 6.4–8.2)
RBC # BLD AUTO: 4.11 M/UL (ref 4–5.2)
REPORT: NORMAL
RESP PATH DNA+RNA PNL NPH NAA+NON-PROBE: ABNORMAL
SODIUM SERPL-SCNC: 143 MMOL/L (ref 136–145)
WBC # BLD AUTO: 8.2 K/UL (ref 4–11)

## 2023-09-26 PROCEDURE — 6360000002 HC RX W HCPCS: Performed by: INTERNAL MEDICINE

## 2023-09-26 PROCEDURE — 94660 CPAP INITIATION&MGMT: CPT

## 2023-09-26 PROCEDURE — 94761 N-INVAS EAR/PLS OXIMETRY MLT: CPT

## 2023-09-26 PROCEDURE — 93010 ELECTROCARDIOGRAM REPORT: CPT | Performed by: INTERNAL MEDICINE

## 2023-09-26 PROCEDURE — 2700000000 HC OXYGEN THERAPY PER DAY

## 2023-09-26 PROCEDURE — 85025 COMPLETE CBC W/AUTO DIFF WBC: CPT

## 2023-09-26 PROCEDURE — 0202U NFCT DS 22 TRGT SARS-COV-2: CPT

## 2023-09-26 PROCEDURE — 2580000003 HC RX 258: Performed by: INTERNAL MEDICINE

## 2023-09-26 PROCEDURE — 6370000000 HC RX 637 (ALT 250 FOR IP): Performed by: INTERNAL MEDICINE

## 2023-09-26 PROCEDURE — 94640 AIRWAY INHALATION TREATMENT: CPT

## 2023-09-26 PROCEDURE — 2000000000 HC ICU R&B

## 2023-09-26 PROCEDURE — 80053 COMPREHEN METABOLIC PANEL: CPT

## 2023-09-26 PROCEDURE — 82728 ASSAY OF FERRITIN: CPT

## 2023-09-26 PROCEDURE — 83615 LACTATE (LD) (LDH) ENZYME: CPT

## 2023-09-26 PROCEDURE — 2500000003 HC RX 250 WO HCPCS: Performed by: INTERNAL MEDICINE

## 2023-09-26 PROCEDURE — 5A09557 ASSISTANCE WITH RESPIRATORY VENTILATION, GREATER THAN 96 CONSECUTIVE HOURS, CONTINUOUS POSITIVE AIRWAY PRESSURE: ICD-10-PCS | Performed by: INTERNAL MEDICINE

## 2023-09-26 PROCEDURE — 36415 COLL VENOUS BLD VENIPUNCTURE: CPT

## 2023-09-26 PROCEDURE — 99291 CRITICAL CARE FIRST HOUR: CPT | Performed by: INTERNAL MEDICINE

## 2023-09-26 PROCEDURE — 84145 PROCALCITONIN (PCT): CPT

## 2023-09-26 RX ORDER — POTASSIUM CHLORIDE 7.45 MG/ML
10 INJECTION INTRAVENOUS PRN
Status: DISCONTINUED | OUTPATIENT
Start: 2023-09-26 | End: 2023-10-05 | Stop reason: HOSPADM

## 2023-09-26 RX ORDER — SODIUM CHLORIDE 0.9 % (FLUSH) 0.9 %
10 SYRINGE (ML) INJECTION EVERY 12 HOURS SCHEDULED
Status: DISCONTINUED | OUTPATIENT
Start: 2023-09-26 | End: 2023-10-05 | Stop reason: HOSPADM

## 2023-09-26 RX ORDER — BENZONATATE 100 MG/1
100 CAPSULE ORAL 3 TIMES DAILY PRN
Status: DISCONTINUED | OUTPATIENT
Start: 2023-09-26 | End: 2023-10-05 | Stop reason: HOSPADM

## 2023-09-26 RX ORDER — DILTIAZEM HYDROCHLORIDE 120 MG/1
240 CAPSULE, COATED, EXTENDED RELEASE ORAL DAILY
Status: DISCONTINUED | OUTPATIENT
Start: 2023-09-26 | End: 2023-10-05 | Stop reason: HOSPADM

## 2023-09-26 RX ORDER — PROMETHAZINE HYDROCHLORIDE 25 MG/1
12.5 TABLET ORAL EVERY 6 HOURS PRN
Status: DISCONTINUED | OUTPATIENT
Start: 2023-09-26 | End: 2023-10-05 | Stop reason: HOSPADM

## 2023-09-26 RX ORDER — IPRATROPIUM BROMIDE AND ALBUTEROL SULFATE 2.5; .5 MG/3ML; MG/3ML
1 SOLUTION RESPIRATORY (INHALATION)
Status: DISCONTINUED | OUTPATIENT
Start: 2023-09-26 | End: 2023-09-28

## 2023-09-26 RX ORDER — ONDANSETRON 2 MG/ML
4 INJECTION INTRAMUSCULAR; INTRAVENOUS EVERY 6 HOURS PRN
Status: DISCONTINUED | OUTPATIENT
Start: 2023-09-26 | End: 2023-10-05 | Stop reason: HOSPADM

## 2023-09-26 RX ORDER — IPRATROPIUM BROMIDE AND ALBUTEROL SULFATE 2.5; .5 MG/3ML; MG/3ML
1 SOLUTION RESPIRATORY (INHALATION) EVERY 4 HOURS PRN
Status: DISCONTINUED | OUTPATIENT
Start: 2023-09-26 | End: 2023-10-05 | Stop reason: HOSPADM

## 2023-09-26 RX ORDER — LEVOTHYROXINE SODIUM 0.1 MG/1
100 TABLET ORAL DAILY
Status: DISCONTINUED | OUTPATIENT
Start: 2023-09-26 | End: 2023-10-05 | Stop reason: HOSPADM

## 2023-09-26 RX ORDER — MAGNESIUM SULFATE IN WATER 40 MG/ML
2000 INJECTION, SOLUTION INTRAVENOUS PRN
Status: DISCONTINUED | OUTPATIENT
Start: 2023-09-26 | End: 2023-10-05 | Stop reason: HOSPADM

## 2023-09-26 RX ORDER — LEVOFLOXACIN 5 MG/ML
750 INJECTION, SOLUTION INTRAVENOUS EVERY 24 HOURS
Status: DISCONTINUED | OUTPATIENT
Start: 2023-09-26 | End: 2023-09-28

## 2023-09-26 RX ORDER — NICOTINE 21 MG/24HR
1 PATCH, TRANSDERMAL 24 HOURS TRANSDERMAL DAILY
Status: DISCONTINUED | OUTPATIENT
Start: 2023-09-26 | End: 2023-09-28

## 2023-09-26 RX ORDER — ENOXAPARIN SODIUM 100 MG/ML
30 INJECTION SUBCUTANEOUS 2 TIMES DAILY
Status: DISCONTINUED | OUTPATIENT
Start: 2023-09-26 | End: 2023-10-05 | Stop reason: HOSPADM

## 2023-09-26 RX ORDER — DEXMEDETOMIDINE HYDROCHLORIDE 4 UG/ML
.1-1.5 INJECTION, SOLUTION INTRAVENOUS CONTINUOUS
Status: DISCONTINUED | OUTPATIENT
Start: 2023-09-26 | End: 2023-10-01

## 2023-09-26 RX ORDER — ACETAMINOPHEN 325 MG/1
650 TABLET ORAL EVERY 6 HOURS PRN
Status: DISCONTINUED | OUTPATIENT
Start: 2023-09-26 | End: 2023-10-05 | Stop reason: HOSPADM

## 2023-09-26 RX ORDER — SODIUM CHLORIDE 0.9 % (FLUSH) 0.9 %
10 SYRINGE (ML) INJECTION PRN
Status: DISCONTINUED | OUTPATIENT
Start: 2023-09-26 | End: 2023-10-05 | Stop reason: HOSPADM

## 2023-09-26 RX ORDER — DEXAMETHASONE SODIUM PHOSPHATE 10 MG/ML
6 INJECTION, SOLUTION INTRAMUSCULAR; INTRAVENOUS EVERY 24 HOURS
Status: DISCONTINUED | OUTPATIENT
Start: 2023-09-27 | End: 2023-09-28

## 2023-09-26 RX ORDER — PANTOPRAZOLE SODIUM 40 MG/1
40 TABLET, DELAYED RELEASE ORAL
Status: DISCONTINUED | OUTPATIENT
Start: 2023-09-26 | End: 2023-10-05 | Stop reason: HOSPADM

## 2023-09-26 RX ORDER — SERTRALINE HYDROCHLORIDE 100 MG/1
100 TABLET, FILM COATED ORAL DAILY
Status: DISCONTINUED | OUTPATIENT
Start: 2023-09-26 | End: 2023-10-05 | Stop reason: HOSPADM

## 2023-09-26 RX ORDER — SODIUM CHLORIDE 9 MG/ML
INJECTION, SOLUTION INTRAVENOUS PRN
Status: DISCONTINUED | OUTPATIENT
Start: 2023-09-26 | End: 2023-10-05 | Stop reason: HOSPADM

## 2023-09-26 RX ORDER — ACETAMINOPHEN 650 MG/1
650 SUPPOSITORY RECTAL EVERY 6 HOURS PRN
Status: DISCONTINUED | OUTPATIENT
Start: 2023-09-26 | End: 2023-10-05 | Stop reason: HOSPADM

## 2023-09-26 RX ORDER — LORAZEPAM 0.5 MG/1
0.25 TABLET ORAL EVERY 6 HOURS PRN
Status: DISCONTINUED | OUTPATIENT
Start: 2023-09-26 | End: 2023-09-27

## 2023-09-26 RX ORDER — ATORVASTATIN CALCIUM 40 MG/1
40 TABLET, FILM COATED ORAL DAILY
Status: DISCONTINUED | OUTPATIENT
Start: 2023-09-26 | End: 2023-10-05 | Stop reason: HOSPADM

## 2023-09-26 RX ADMIN — ATORVASTATIN CALCIUM 40 MG: 40 TABLET, FILM COATED ORAL at 09:50

## 2023-09-26 RX ADMIN — ENOXAPARIN SODIUM 30 MG: 100 INJECTION SUBCUTANEOUS at 03:13

## 2023-09-26 RX ADMIN — Medication 0.2 MCG/KG/HR: at 17:01

## 2023-09-26 RX ADMIN — IPRATROPIUM BROMIDE AND ALBUTEROL SULFATE 1 DOSE: 2.5; .5 SOLUTION RESPIRATORY (INHALATION) at 15:36

## 2023-09-26 RX ADMIN — LEVOFLOXACIN 750 MG: 5 INJECTION, SOLUTION INTRAVENOUS at 09:51

## 2023-09-26 RX ADMIN — ENOXAPARIN SODIUM 30 MG: 100 INJECTION SUBCUTANEOUS at 09:50

## 2023-09-26 RX ADMIN — IPRATROPIUM BROMIDE AND ALBUTEROL SULFATE 1 DOSE: 2.5; .5 SOLUTION RESPIRATORY (INHALATION) at 11:25

## 2023-09-26 RX ADMIN — PANTOPRAZOLE SODIUM 40 MG: 40 TABLET, DELAYED RELEASE ORAL at 05:42

## 2023-09-26 RX ADMIN — SODIUM CHLORIDE, PRESERVATIVE FREE 10 ML: 5 INJECTION INTRAVENOUS at 20:15

## 2023-09-26 RX ADMIN — WATER 40 MG: 1 INJECTION INTRAMUSCULAR; INTRAVENOUS; SUBCUTANEOUS at 20:15

## 2023-09-26 RX ADMIN — WATER 40 MG: 1 INJECTION INTRAMUSCULAR; INTRAVENOUS; SUBCUTANEOUS at 09:50

## 2023-09-26 RX ADMIN — SERTRALINE 100 MG: 100 TABLET, FILM COATED ORAL at 09:50

## 2023-09-26 RX ADMIN — MUPIROCIN: 20 OINTMENT TOPICAL at 20:17

## 2023-09-26 RX ADMIN — MUPIROCIN: 20 OINTMENT TOPICAL at 09:50

## 2023-09-26 RX ADMIN — LORAZEPAM 0.25 MG: 0.5 TABLET ORAL at 22:28

## 2023-09-26 RX ADMIN — ENOXAPARIN SODIUM 30 MG: 100 INJECTION SUBCUTANEOUS at 20:21

## 2023-09-26 RX ADMIN — IPRATROPIUM BROMIDE AND ALBUTEROL SULFATE 1 DOSE: 2.5; .5 SOLUTION RESPIRATORY (INHALATION) at 19:27

## 2023-09-26 RX ADMIN — DILTIAZEM HYDROCHLORIDE 240 MG: 120 CAPSULE, COATED, EXTENDED RELEASE ORAL at 09:50

## 2023-09-26 RX ADMIN — LEVOTHYROXINE SODIUM 100 MCG: 100 TABLET ORAL at 05:42

## 2023-09-26 RX ADMIN — SODIUM CHLORIDE, PRESERVATIVE FREE 10 ML: 5 INJECTION INTRAVENOUS at 09:58

## 2023-09-26 NOTE — PROGRESS NOTES
4 Eyes Skin Assessment     NAME:  Jocelin Obando  YOB: 1955  MEDICAL RECORD NUMBER:  [id number]    The patient is being assessed for  Admission    I agree that at least one RN has performed a thorough Head to Toe Skin Assessment on the patient. ALL assessment sites listed below have been assessed. Areas assessed by both nurses:    Head, Face, Ears, Shoulders, Back, Chest, Arms, Elbows, Hands, Sacrum. Buttock, Coccyx, Ischium, Legs. Feet and Heels, and Under Medical Devices         Does the Patient have a Wound?  No noted wound(s)       Amos Prevention initiated by RN: Yes  Wound Care Orders initiated by RN: No    Pressure Injury (Stage 3,4, Unstageable, DTI, NWPT, and Complex wounds) if present, place Wound referral order by RN under : No    New Ostomies, if present place, Ostomy referral order under : No     Nurse 1 eSignature: Electronically signed by Luana Almaraz RN on 9/26/23 at 1:42 AM EDT    **SHARE this note so that the co-signing nurse can place an eSignature**    Nurse 2 eSignature: Electronically signed by Sydnie Cisneros RN on 9/26/23 at 1:43 AM EDT

## 2023-09-26 NOTE — PROGRESS NOTES
09/26/23 0314   NIV Type   $NIV $Daily Charge   NIV Started/Stopped (S)  On   Equipment Type v60   Mode Bilevel   Mask Type Full face mask   Mask Size Small   Assessment   Pulse 99   Respirations 14   SpO2 98 %   Settings/Measurements   IPAP 16 cmH20   CPAP/EPAP 8 cmH2O   Vt (Measured) 480 mL   Rate Ordered 14   FiO2  55 %   I Time/ I Time % 0.9 s   Minute Volume (L/min) 10.6 Liters   Mask Leak (lpm) 7 lpm   Patient's Home Machine No   Alarm Settings   Alarms On Y   Low Pressure (cmH2O) 5 cmH2O   High Pressure (cmH2O) 35 cmH2O   Delay Alarm 20 sec(s)   RR Low (bpm) 14   RR High (bpm) 40 br/min

## 2023-09-26 NOTE — PROGRESS NOTES
Pt remains on Bipap o2 sat 96%. Pt taken off for frequent oral care & sips of water. PIV wnl. No needs expressed. Pure wick in place and functioning properly.

## 2023-09-26 NOTE — PALLIATIVE CARE
Palliative Care Initial Note  Palliative Care Admit date:09/26/2023    Advance Directives:Full Code    Plan of care/goals:Reviewed chart. Pt remains confused at this time. Per spouse pt has been declining over the past couple of years and now wheelchair bound. Pt's daughter,Bhakti, is very involved with her care and is her home health aide. Family met with hospice PTA and per daughter,Bhakti, goal is possible lung transplant and continue with home care and [facility] with [facility] at this time. PC following peripherally for family support.     Social/Spiritual: Prayer Support    Plan: goal is home with homecare and [address] [place] [address] with [facility]    Reason for consult:    _x__ Advance Care Planning  _x__ Transition of Care Planning  _x__ Psychosocial/Spiritual Support  _x__ Symptom Management    Yolanda Otero RN Palliative Care

## 2023-09-26 NOTE — PLAN OF CARE
Pt admitted from [facility], belongings at bedside. Pt on 10 L NRB with SpO2 99%, VSS. Peripheral IV's patent. Purewick repositioned and patent. Reviewed with pt plan of care for shift and medications, pt voices no concerns. Problem: Discharge Planning  Goal: Discharge to home or other facility with appropriate resources  Outcome: Progressing  Flowsheets  Taken 9/26/2023 0128  Discharge to home or other facility with appropriate resources:   Identify barriers to discharge with patient and caregiver   Arrange for needed discharge resources and transportation as appropriate  Taken 9/26/2023 0112  Discharge to home or other facility with appropriate resources:   Identify barriers to discharge with patient and caregiver   Arrange for needed discharge resources and transportation as appropriate     Problem: Skin/Tissue Integrity  Goal: Absence of new skin breakdown  Description: 1. Monitor for areas of redness and/or skin breakdown  2. Assess vascular access sites hourly  3. Every 4-6 hours minimum:  Change oxygen saturation probe site  4. Every 4-6 hours:  If on nasal continuous positive airway pressure, respiratory therapy assess nares and determine need for appliance change or resting period.   Outcome: Progressing     Problem: Respiratory - Adult  Goal: Achieves optimal ventilation and oxygenation  Outcome: Progressing     Problem: Pain  Goal: Verbalizes/displays adequate comfort level or baseline comfort level  Outcome: Progressing

## 2023-09-26 NOTE — ACP (ADVANCE CARE PLANNING)
Advance Care Planning     General Advance Care Planning (ACP) Conversation    Date of Conversation: 9/25/2023  Conducted with: pt's spouse    Healthcare Decision Maker:    Primary Decision Maker: Vitaly Acosta - Spouse - [phone]    Secondary Decision Maker: Cam Linda - Child - [phone]  Click here to complete Healthcare Decision Makers including selection of the Healthcare Decision Maker Relationship (ie \"Primary\").       Content/Action Overview:    Pt is Full Code    Audrey Mascorro, RN

## 2023-09-26 NOTE — PROGRESS NOTES
09/26/23 1152   Oxygen Therapy/Pulse Ox   O2 Therapy Oxygen humidified   O2 Device Heated high flow cannula   O2 Flow Rate (L/min) 30 L/min   FiO2  55 %   SpO2 95 %

## 2023-09-26 NOTE — PROGRESS NOTES
09/26/23 0745   NIV Type   Mode Bilevel   Mask Type Full face mask   Mask Size Small   Settings/Measurements   PIP Observed 17 cm H20   IPAP 16 cmH20   CPAP/EPAP 8 cmH2O   Vt (Measured) 517 mL   Rate Ordered 14   FiO2  55 %   I Time/ I Time % 0.9 s   Minute Volume (L/min) 11.9 Liters   Mask Leak (lpm) 9 lpm   Patient's Home Machine No   Alarm Settings   Alarms On Y   Low Pressure (cmH2O) 5 cmH2O   High Pressure (cmH2O) 35 cmH2O   Delay Alarm 20 sec(s)   RR Low (bpm) 14   RR High (bpm) 40 br/min   Patient Observation   Observations pt resting comfortably on bipap at this time.

## 2023-09-26 NOTE — H&P
MD Trvaon at [address] [place] [address]  6/27/2019    BRONCHOSCOPY/TRANSBRONCHIAL NEEDLE BIOPSY performed by Mitzi Rosario MD at [address] [place] [address]  6/27/2019    BRONCHOSCOPY/TRANSBRONCHIAL LUNG BIOPSY performed by Mitzi Rosario MD at [address] [place] [address]  6/27/2019    BRONCHOSCOPY ALVEOLAR LAVAGE performed by Mitzi Rosario MD at [address] [place] [address]  07/10/2019    BRONCHOSCOPY N/A 7/10/2019    BRONCHOSCOPY ALVEOLAR LAVAGE performed by Azalea Mckeon MD at [address] [place] [address] Bilateral 08/06/2019    BRONCHOSCOPY N/A 8/6/2019    BRONCHOSCOPY ALVEOLAR LAVAGE performed by Evelia Crews MD at [address] [place] [address] N/A 12/24/2019    BRONCHOSCOPY ALVEOLAR LAVAGE performed by Yulia Grayson MD at [address] [place] [address] N/A 8/25/2022    COLONOSCOPY POLYPECTOMY SNARE/COLD BIOPSY performed by Darrin Hartman MD at [facility]  9/15/2022    CT GUIDED CHEST TUBE 9/15/2022 [address] SCAN    CT INSERT CATH PLEURA W IMAGE  11/28/2022    CT INSERT CATH PLEURA W IMAGE 11/28/2022 Cindy Rowland MD [facility] CT SCAN    HYSTERECTOMY, TOTAL ABDOMINAL (CERVIX REMOVED)      partial       Medications Prior to Admission:      Prior to Admission medications    Medication Sig Start Date End Date Taking?  Authorizing Provider   predniSONE (DELTASONE) 10 MG tablet TAKE 1 TABLET BY MOUTH EVERY DAY 9/8/23   Ani Boykin MD   Pirfenidone 267 MG CAPS Take 1 capsule by mouth in the morning, at noon, and at bedtime    Historical Provider, MD   albuterol sulfate HFA (PROVENTIL;VENTOLIN;PROAIR) 108 (90 Base) MCG/ACT inhaler Inhale 2 puffs into the lungs every 6 hours as needed for Wheezing    Historical Provider, MD   atorvastatin (LIPITOR) 40 MG tablet TAKE 1 TABLET EVERY DAY 8/2/23   Conner Mena MD   ondansetron (ZOFRAN) 4 MG tablet Take 1 tablet by mouth 3 times daily as needed for Nausea or Vomiting 8/2/23 medication  Pre-Diabetes: Reviewed patient's medications. Last A1c 5.8      Discussion with the primary ER physician in regards to symptoms, history, physical exam, diagnosis and treatment, collaborative decision was to admit the patient.     Diet: Advance as tolerated    DVT Prophylaxis: lovenox    Dispo:   Expected LOS of two days         Sami Henderson DO

## 2023-09-26 NOTE — CARE COORDINATION
Case Management Assessment  Initial Evaluation    Date/Time of Evaluation: 9/26/2023 11:32 AM  Assessment Completed by: Pepper Lam RN    If patient is discharged prior to next notation, then this note serves as note for discharge by case management. Patient Name: Nigel Obando                   YOB: 1955  Diagnosis: Thrombocytopenia ([address]) [D69.6]  Hypoxia [R09.02]  Acute respiratory failure with hypoxia (HCC) [J96.01]  Interstitial lung disease ([address]) [J84.9]  Dyspnea, unspecified type [R06.00]                   Date / Time: 9/25/2023  6:09 PM    Patient Admission Status: Inpatient   Readmission Risk (Low < 19, Mod (19-27), High > 27): Readmission Risk Score: 39.5    Current PCP: Sapphire Zepeda MD  PCP verified by CM? Yes    Chart Reviewed: Yes      History Provided by: Child/Family, Spouse  Patient Orientation: Alert and Oriented    Patient Cognition: Alert    Hospitalization in the last 30 days (Readmission):  No    If yes, Readmission Assessment in CM Navigator will be completed.     Advance Directives:      Code Status: Full Code   Patient's Primary Decision Maker is: Named in Scanned ACP Document    Primary Decision Maker: Darlin Cornejo - Spouse - [phone]    Secondary Decision Maker: Susy Castillo - Child - [phone]    Discharge Planning:    Patient lives with: Spouse/Significant Other Type of Home: House  Primary Care Giver: Family  Patient Support Systems include: Family Members, Spouse/Significant Other   Current Financial resources: Medicare ([employer])  Current community resources: ECF/Home Care  Current services prior to admission: Oxygen Therapy ([place], 8-10 liters at baseline)            Current DME:              Type of Home Care services:  PT, OT, Nursing Services    ADLS  Prior functional level: Assistance with the following:, Bathing, Dressing, Toileting, Feeding, Cooking, Housework, Shopping, Mobility  Current functional level: Assistance with the following:    PT AM-PAC:   /24  OT AM-PAC:   /24    Family can provide assistance at DC: Yes  Would you like Case Management to discuss the discharge plan with any other family members/significant others, and if so, who? Yes (dtr,Bhakti and spouse,Rony)  Plans to Return to Present Housing: Unknown at present  Other Identified Issues/Barriers to RETURNING to current housing: pain  Potential Assistance needed at discharge: N/A            Potential DME:    Patient expects to discharge to: Unknown  Plan for transportation at discharge:      Financial    Payor: Sterling Gilbert / Plan: Eva Kuo / Product Type: *No Product type* /     Does insurance require precert for SNF: Yes    Potential assistance Purchasing Medications: No  Meds-to-Beds request:        [id number] [place], [place] - [address] [facility] P [phone] - F [phone]  [address] [place] [address]  Phone: [phone] Fax: [phone]    [id number]- [place], [facility] [facility],[address] [facility]  [address] [place] [address]  Phone: [phone] Fax: [phone]    [address] [place] [address] [phone] Velma Paste [phone]  [address] [place] [address]  Phone: [phone] Fax: [phone]    [address] [address] [phone] - F [phone]  [address] [facility]  [address]   Phone: [phone] Fax: [phone]    [address] [facility], [address] [facility]. Ankit Starkey [phone] - f [phone]  [address] [facility]. [address] [facility]  Phone: [phone] Fax: [phone]      Notes:    Factors facilitating achievement of predicted outcomes: Family support and Home is wheelchair accessible    Barriers to discharge: Pain and Confusion    Additional Case Management Notes: Reviewed chart.  Writer spoke with pt's

## 2023-09-26 NOTE — PROGRESS NOTES
AM assessment completed. AM labs reviewed. Pt awake & alert on Bipap. PIVs wnl. Purewick in place. Pt states she is comfortable on Bipap. Awaiting care rounds.   Panda Masters RN, BSN

## 2023-09-26 NOTE — PROGRESS NOTES
Occupational Therapy/Physical Therapy    OT/PT orders received and appreciated. Hold this date per nursing. Will recheck tomorrow.     Jennifer Saucedo, OTR/L #[id number]   Rubio Leone, [address] [place] [address] [facility] [address] [address]

## 2023-09-26 NOTE — ED NOTES
Verbal report given to ARTHUR Jones RN.  Pt resting with OU closed, resps even and unlab, daughter @ bedside     Cuco Marvin RN  09/25/23 2011

## 2023-09-26 NOTE — PROGRESS NOTES
Inpatient Physical Therapy Evaluation & Treatment    Unit: ICU  Date:  9/27/2023  Patient Name:    Fe Obando  Admitting diagnosis: Thrombocytopenia ([address]) [D69.6]  Hypoxia [R09.02]  Acute respiratory failure with hypoxia (HCC) [J96.01]  Interstitial lung disease ([address]) [J84.9]  Dyspnea, unspecified type [R06.00]  Admit Date:  9/25/2023  Precautions/Restrictions/WB Status/ Lines/ Wounds/ Oxygen: Fall risk, Bed/chair alarm, Lines (IV, Supplemental O2 (30 L FiO2 @ 55%L), and external catheter), Telemetry, and Continuous pulse oximetry      Pt seen for cotreatment this date due to patient safety, patient endurance, acute illness/injury, and limited functional status information    Treatment Time:  [id number]  Treatment Number:  1   Timed Code Treatment Minutes: 23 minutes  Total Treatment Minutes:  33  minutes    Patient Stated Goals for Therapy: none stated          Discharge Recommendations: SNF  DME needs for discharge: Defer to facility       Therapy recommendation for EMS Transport: requires transport by cot due to pt with poor sitting balance/tolerance    Therapy recommendations for staff:   Assist of 1 for sitting EOB    History of Present Illness:   Per Dr West Pashto H&P    77 y.o. female who presented to [facility] with past medical history of atrial fibrillation, COPD, HFpEF, GERD, type 2 diabetes, hypertension, hyperlipidemia, chronic hypoxic respiratory failure on 3 L presented to ED with chief complaint of shortness of breath     Patient was accepted from [facility] to be transferred to [facility]     Patient reported that shortness of breath started today progressively worsening thus called the ambulance no known alleviating factor, reported exacerbated with exertion no associate with fever chills abdominal pain chest pain or dysuria. Patient reports that her daughter visited her and does not know if she got the COVID from that.     Home Health S4 Level Recommendation:  NA        AM-PAC Treatment. Pt demonstrated decreased Activity tolerance, Balance, ROM, Safety, and Strength as well as decreased independence with Ambulation, Bed Mobility , and Transfers. Pt is a 78 y/o female who presents below her functional baseline. Pt with significantly limited functional activity tolerance, SpO2 dropping and RN present to don BiPap. Pt would continue to benefit from skilled PT services to promote increased strength, balance and functional activity tolerance. Recommend continued skilled PT services upon discharge. Recommending SNF upon discharge as patient functioning well below baseline, demonstrates good rehab potential and unable to return home due to inability to negotiate stairs to enter home/bedroom/bathroom, burden of care beyond caregiver ability, home environment not conducive to patient recovery, and limited safety awareness. Goals : To be met in 3 visits:  1). Independent with LE Ex x 10 reps  2). Sit to/from stand:  ability to assess  3). Bed to chair:  ability to assess      To be met in 6 visits:  1). Supine to/from sit: SBA  2). Sit to/from stand: Min A   3). Bed to chair: Min A   4). Gait: Ambulate  15 ft.   with Min A  and use of LRAD (least restrictive assistive device)  5). Tolerate B LE exercises 3 sets of 10-15 reps      Rehabilitation Potential: Fair  Strengths for achieving goals include:   Pt motivated, PLOF, Family Support, and Pt cooperative   Barriers to achieving goals include:    Complexity of condition, Chronicity of condition, and Weakness        Plan    To be seen 3-5 x / week  while in acute care setting for therapeutic exercises, bed mobility, transfers, progressive gait training, balance training, and family/patient education. Signature: nAgeles Gutierrez, PT     If patient discharges from this facility prior to next visit, this note will serve as the Discharge Summary.

## 2023-09-27 PROBLEM — I48.0 PAROXYSMAL ATRIAL FIBRILLATION (HCC): Status: ACTIVE | Noted: 2023-09-27

## 2023-09-27 PROBLEM — R45.1 AGITATION: Status: ACTIVE | Noted: 2023-09-27

## 2023-09-27 LAB
25(OH)D3 SERPL-MCNC: 17.8 NG/ML
ANION GAP SERPL CALCULATED.3IONS-SCNC: 8 MMOL/L (ref 3–16)
APTT BLD: 35.9 SEC (ref 22.7–35.9)
BASOPHILS # BLD: 0 K/UL (ref 0–0.2)
BASOPHILS NFR BLD: 0.5 %
BUN SERPL-MCNC: 21 MG/DL (ref 7–20)
CALCIUM SERPL-MCNC: 8.3 MG/DL (ref 8.3–10.6)
CHLORIDE SERPL-SCNC: 93 MMOL/L (ref 99–110)
CO2 SERPL-SCNC: 30 MMOL/L (ref 21–32)
CREAT SERPL-MCNC: 0.6 MG/DL (ref 0.6–1.2)
DEPRECATED RDW RBC AUTO: 14.6 % (ref 12.4–15.4)
EOSINOPHIL # BLD: 0 K/UL (ref 0–0.6)
EOSINOPHIL NFR BLD: 0 %
FERRITIN SERPL IA-MCNC: 100.7 NG/ML (ref 15–150)
FIBRINOGEN PPP-MCNC: 453 MG/DL (ref 243–550)
GFR SERPLBLD CREATININE-BSD FMLA CKD-EPI: >60 ML/MIN/{1.73_M2}
GLUCOSE BLD-MCNC: 103 MG/DL (ref 70–99)
GLUCOSE BLD-MCNC: 171 MG/DL (ref 70–99)
GLUCOSE BLD-MCNC: 215 MG/DL (ref 70–99)
GLUCOSE BLD-MCNC: 230 MG/DL (ref 70–99)
GLUCOSE SERPL-MCNC: 158 MG/DL (ref 70–99)
HCT VFR BLD AUTO: 31.5 % (ref 36–48)
HGB BLD-MCNC: 10.4 G/DL (ref 12–16)
INR PPP: 1.31 (ref 0.84–1.16)
LACTATE BLDV-SCNC: 1 MMOL/L (ref 0.4–2)
LYMPHOCYTES # BLD: 0.4 K/UL (ref 1–5.1)
LYMPHOCYTES NFR BLD: 6.9 %
MAGNESIUM SERPL-MCNC: 2 MG/DL (ref 1.8–2.4)
MCH RBC QN AUTO: 27.7 PG (ref 26–34)
MCHC RBC AUTO-ENTMCNC: 33 G/DL (ref 31–36)
MCV RBC AUTO: 84.2 FL (ref 80–100)
MONOCYTES # BLD: 0.3 K/UL (ref 0–1.3)
MONOCYTES NFR BLD: 5.1 %
NEUTROPHILS # BLD: 5.5 K/UL (ref 1.7–7.7)
NEUTROPHILS NFR BLD: 87.5 %
PERFORMED ON: ABNORMAL
PHOSPHATE SERPL-MCNC: 3.2 MG/DL (ref 2.5–4.9)
PLATELET # BLD AUTO: 155 K/UL (ref 135–450)
PMV BLD AUTO: 8 FL (ref 5–10.5)
POTASSIUM SERPL-SCNC: 3.3 MMOL/L (ref 3.5–5.1)
PROTHROMBIN TIME: 16.2 SEC (ref 11.5–14.8)
RBC # BLD AUTO: 3.74 M/UL (ref 4–5.2)
SODIUM SERPL-SCNC: 131 MMOL/L (ref 136–145)
TROPONIN, HIGH SENSITIVITY: 20 NG/L (ref 0–14)
WBC # BLD AUTO: 6.3 K/UL (ref 4–11)

## 2023-09-27 PROCEDURE — 97530 THERAPEUTIC ACTIVITIES: CPT

## 2023-09-27 PROCEDURE — 80048 BASIC METABOLIC PNL TOTAL CA: CPT

## 2023-09-27 PROCEDURE — 99233 SBSQ HOSP IP/OBS HIGH 50: CPT | Performed by: INTERNAL MEDICINE

## 2023-09-27 PROCEDURE — 2580000003 HC RX 258: Performed by: INTERNAL MEDICINE

## 2023-09-27 PROCEDURE — 85025 COMPLETE CBC W/AUTO DIFF WBC: CPT

## 2023-09-27 PROCEDURE — 94761 N-INVAS EAR/PLS OXIMETRY MLT: CPT

## 2023-09-27 PROCEDURE — 2700000000 HC OXYGEN THERAPY PER DAY

## 2023-09-27 PROCEDURE — 6370000000 HC RX 637 (ALT 250 FOR IP): Performed by: INTERNAL MEDICINE

## 2023-09-27 PROCEDURE — 83605 ASSAY OF LACTIC ACID: CPT

## 2023-09-27 PROCEDURE — 99291 CRITICAL CARE FIRST HOUR: CPT | Performed by: INTERNAL MEDICINE

## 2023-09-27 PROCEDURE — 85384 FIBRINOGEN ACTIVITY: CPT

## 2023-09-27 PROCEDURE — 6360000002 HC RX W HCPCS: Performed by: INTERNAL MEDICINE

## 2023-09-27 PROCEDURE — 2000000000 HC ICU R&B

## 2023-09-27 PROCEDURE — 85730 THROMBOPLASTIN TIME PARTIAL: CPT

## 2023-09-27 PROCEDURE — 94640 AIRWAY INHALATION TREATMENT: CPT

## 2023-09-27 PROCEDURE — 85610 PROTHROMBIN TIME: CPT

## 2023-09-27 PROCEDURE — 97165 OT EVAL LOW COMPLEX 30 MIN: CPT

## 2023-09-27 PROCEDURE — 82306 VITAMIN D 25 HYDROXY: CPT

## 2023-09-27 PROCEDURE — 84484 ASSAY OF TROPONIN QUANT: CPT

## 2023-09-27 PROCEDURE — 94660 CPAP INITIATION&MGMT: CPT

## 2023-09-27 PROCEDURE — 83735 ASSAY OF MAGNESIUM: CPT

## 2023-09-27 PROCEDURE — 36415 COLL VENOUS BLD VENIPUNCTURE: CPT

## 2023-09-27 PROCEDURE — 84100 ASSAY OF PHOSPHORUS: CPT

## 2023-09-27 PROCEDURE — 97162 PT EVAL MOD COMPLEX 30 MIN: CPT

## 2023-09-27 RX ORDER — INSULIN LISPRO 100 [IU]/ML
0-4 INJECTION, SOLUTION INTRAVENOUS; SUBCUTANEOUS NIGHTLY
Status: DISCONTINUED | OUTPATIENT
Start: 2023-09-27 | End: 2023-09-28

## 2023-09-27 RX ORDER — QUETIAPINE FUMARATE 25 MG/1
12.5 TABLET, FILM COATED ORAL 2 TIMES DAILY
Status: DISCONTINUED | OUTPATIENT
Start: 2023-09-27 | End: 2023-10-05 | Stop reason: HOSPADM

## 2023-09-27 RX ORDER — OXYMETAZOLINE HYDROCHLORIDE 0.05 G/100ML
2 SPRAY NASAL 2 TIMES DAILY PRN
Status: DISPENSED | OUTPATIENT
Start: 2023-09-27 | End: 2023-09-30

## 2023-09-27 RX ORDER — POTASSIUM CHLORIDE 20 MEQ/1
20 TABLET, EXTENDED RELEASE ORAL ONCE
Status: COMPLETED | OUTPATIENT
Start: 2023-09-27 | End: 2023-09-27

## 2023-09-27 RX ORDER — LORAZEPAM 0.5 MG/1
0.5 TABLET ORAL EVERY 6 HOURS PRN
Status: DISCONTINUED | OUTPATIENT
Start: 2023-09-27 | End: 2023-10-05 | Stop reason: HOSPADM

## 2023-09-27 RX ORDER — DEXTROSE MONOHYDRATE 100 MG/ML
INJECTION, SOLUTION INTRAVENOUS CONTINUOUS PRN
Status: DISCONTINUED | OUTPATIENT
Start: 2023-09-27 | End: 2023-10-05 | Stop reason: HOSPADM

## 2023-09-27 RX ORDER — INSULIN LISPRO 100 [IU]/ML
0-4 INJECTION, SOLUTION INTRAVENOUS; SUBCUTANEOUS
Status: DISCONTINUED | OUTPATIENT
Start: 2023-09-28 | End: 2023-09-28

## 2023-09-27 RX ORDER — POTASSIUM CHLORIDE 750 MG/1
30 TABLET, EXTENDED RELEASE ORAL ONCE
Status: DISCONTINUED | OUTPATIENT
Start: 2023-09-27 | End: 2023-10-05 | Stop reason: HOSPADM

## 2023-09-27 RX ADMIN — QUETIAPINE FUMARATE 12.5 MG: 25 TABLET ORAL at 13:03

## 2023-09-27 RX ADMIN — IPRATROPIUM BROMIDE AND ALBUTEROL SULFATE 1 DOSE: 2.5; .5 SOLUTION RESPIRATORY (INHALATION) at 07:47

## 2023-09-27 RX ADMIN — LORAZEPAM 0.25 MG: 0.5 TABLET ORAL at 07:03

## 2023-09-27 RX ADMIN — MUPIROCIN: 20 OINTMENT TOPICAL at 08:25

## 2023-09-27 RX ADMIN — POTASSIUM CHLORIDE 10 MEQ: 7.46 INJECTION, SOLUTION INTRAVENOUS at 06:25

## 2023-09-27 RX ADMIN — ENOXAPARIN SODIUM 30 MG: 100 INJECTION SUBCUTANEOUS at 19:59

## 2023-09-27 RX ADMIN — SODIUM CHLORIDE, PRESERVATIVE FREE 10 ML: 5 INJECTION INTRAVENOUS at 08:39

## 2023-09-27 RX ADMIN — LEVOFLOXACIN 750 MG: 5 INJECTION, SOLUTION INTRAVENOUS at 09:15

## 2023-09-27 RX ADMIN — PANTOPRAZOLE SODIUM 40 MG: 40 TABLET, DELAYED RELEASE ORAL at 06:23

## 2023-09-27 RX ADMIN — DILTIAZEM HYDROCHLORIDE 240 MG: 120 CAPSULE, COATED, EXTENDED RELEASE ORAL at 08:23

## 2023-09-27 RX ADMIN — ENOXAPARIN SODIUM 30 MG: 100 INJECTION SUBCUTANEOUS at 08:22

## 2023-09-27 RX ADMIN — SERTRALINE 100 MG: 100 TABLET, FILM COATED ORAL at 08:23

## 2023-09-27 RX ADMIN — WATER 40 MG: 1 INJECTION INTRAMUSCULAR; INTRAVENOUS; SUBCUTANEOUS at 09:04

## 2023-09-27 RX ADMIN — Medication 2 SPRAY: at 13:03

## 2023-09-27 RX ADMIN — IPRATROPIUM BROMIDE AND ALBUTEROL SULFATE 1 DOSE: 2.5; .5 SOLUTION RESPIRATORY (INHALATION) at 19:30

## 2023-09-27 RX ADMIN — POTASSIUM CHLORIDE 10 MEQ: 7.46 INJECTION, SOLUTION INTRAVENOUS at 11:34

## 2023-09-27 RX ADMIN — LEVOTHYROXINE SODIUM 100 MCG: 100 TABLET ORAL at 06:23

## 2023-09-27 RX ADMIN — SODIUM CHLORIDE, PRESERVATIVE FREE 10 ML: 5 INJECTION INTRAVENOUS at 19:49

## 2023-09-27 RX ADMIN — POTASSIUM CHLORIDE 20 MEQ: 1500 TABLET, EXTENDED RELEASE ORAL at 13:34

## 2023-09-27 RX ADMIN — QUETIAPINE FUMARATE 12.5 MG: 25 TABLET ORAL at 20:38

## 2023-09-27 RX ADMIN — IPRATROPIUM BROMIDE AND ALBUTEROL SULFATE 1 DOSE: 2.5; .5 SOLUTION RESPIRATORY (INHALATION) at 15:28

## 2023-09-27 RX ADMIN — DEXAMETHASONE SODIUM PHOSPHATE 6 MG: 10 INJECTION INTRAMUSCULAR; INTRAVENOUS at 08:22

## 2023-09-27 RX ADMIN — WATER 40 MG: 1 INJECTION INTRAMUSCULAR; INTRAVENOUS; SUBCUTANEOUS at 19:59

## 2023-09-27 RX ADMIN — ATORVASTATIN CALCIUM 40 MG: 40 TABLET, FILM COATED ORAL at 08:22

## 2023-09-27 RX ADMIN — MUPIROCIN: 20 OINTMENT TOPICAL at 19:49

## 2023-09-27 RX ADMIN — ACETAMINOPHEN 650 MG: 325 TABLET ORAL at 07:04

## 2023-09-27 RX ADMIN — IPRATROPIUM BROMIDE AND ALBUTEROL SULFATE 1 DOSE: 2.5; .5 SOLUTION RESPIRATORY (INHALATION) at 11:27

## 2023-09-27 ASSESSMENT — PAIN SCALES - GENERAL
PAINLEVEL_OUTOF10: 0
PAINLEVEL_OUTOF10: 6

## 2023-09-27 ASSESSMENT — PAIN DESCRIPTION - DESCRIPTORS: DESCRIPTORS: ACHING

## 2023-09-27 ASSESSMENT — PAIN DESCRIPTION - ORIENTATION: ORIENTATION: LEFT

## 2023-09-27 ASSESSMENT — PAIN DESCRIPTION - LOCATION: LOCATION: ARM

## 2023-09-27 NOTE — PROGRESS NOTES
Pt participated with PT/OT became very SOB despite high flow NC and non re breather. Bipap placed on pt at this time. Pt remains alert and oriented.  CLWR

## 2023-09-27 NOTE — PROGRESS NOTES
Shift assessment was completed (see flow sheet). Pt is alert/oriented on Precedex    Respirations are 25-30,  with diminished breath sounds throughout; pt is requiring high shade O2 as well as non rebreather with any activity, including talking. Pt verbalizes willingness for intubation if required. Pt with skin intact but refusing re postioning off left side. Infusing:  Precedex, Iv antibiotics and potassium supplements     IV access- 2 PIV left forearm and WDL. Pt with external catheter; voiding adequate amts of clear yellow urine. Scheduled medications to follow. Call light within reach. Bed in lowest position. Bed alarm on.    Will continue to monitor

## 2023-09-27 NOTE — PROGRESS NOTES
Per Dr Mariana Lutz- ok to give PO potassium 30meq instead of the equivalent of IV potassium. Orders placed. 8:59 AM  Pt unable to swallow PO potassium d/t size of tablet.

## 2023-09-27 NOTE — PROGRESS NOTES
Inpatient Occupational Therapy Evaluation and Treatment    Unit: ICU  Date:  9/27/2023  Patient Name:    Michaela Obando  Admitting diagnosis: Thrombocytopenia ([address]) [D69.6]  Hypoxia [R09.02]  Acute respiratory failure with hypoxia (HCC) [J96.01]  Interstitial lung disease ([address]) [J84.9]  Dyspnea, unspecified type [R06.00]  Admit Date:  9/25/2023  Precautions/Restrictions/WB Status/ Lines/ Wounds/ Oxygen: Fall risk, Bed/chair alarm, Lines (IV, Supplemental O2 (30L FiO2 @ 55%L), and external catheter), Telemetry, and Continuous pulse oximetry    Pt seen for cotreatment this date due to patient safety, patient endurance, complexity of condition, acute illness/injury, limited functional status information, and need for the assistance of 2 skilled therapists    Treatment Time:  0452-6778  Treatment Number:  1  Timed Code Treatment Minutes: 14 minutes  Total Treatment Minutes:  14  minutes    Patient Goals for Therapy: \"none stated \"          Discharge Recommendations: SNF  DME needs for discharge: Defer to facility       Therapy recommendations for staff:   Assist of 1 for sitting EOB    History of Present Illness: Per Dr Rafia Hernandez H&P     77 y.o. female who presented to [facility] with past medical history of atrial fibrillation, COPD, HFpEF, GERD, type 2 diabetes, hypertension, hyperlipidemia, chronic hypoxic respiratory failure on 3 L presented to ED with chief complaint of shortness of breath     Patient was accepted from [place] to be transferred to [facility]     Patient reported that shortness of breath started today progressively worsening thus called the ambulance no known alleviating factor, reported exacerbated with exertion no associate with fever chills abdominal pain chest pain or dysuria. Patient reports that her daughter visited her and does not know if she got the COVID from that.     Home Health S4 Level Recommendation:  NA    AM-PAC Score: AM-PAC Inpatient Daily Activity Raw Score: 9

## 2023-09-27 NOTE — PLAN OF CARE
Problem: Skin/Tissue Integrity  Goal: Absence of new skin breakdown  Description: 1. Monitor for areas of redness and/or skin breakdown  2. Assess vascular access sites hourly  3. Every 4-6 hours minimum:  Change oxygen saturation probe site  4. Every 4-6 hours:  If on nasal continuous positive airway pressure, respiratory therapy assess nares and determine need for appliance change or resting period. 9/27/2023 0951 by Josee Nses RN  Outcome: Progressing  Note: Z-Lloyd fluid repositioner provided.     Observe skin folds for moisture  9/26/2023 2053 by Sunshine Amaro RN  Outcome: Progressing     Problem: Respiratory - Adult  Goal: Achieves optimal ventilation and oxygenation  9/27/2023 0951 by Josee Ness RN  Outcome: Progressing  Flowsheets (Taken 9/27/2023 2601)  Achieves optimal ventilation and oxygenation:   Assess for changes in respiratory status   Assess for changes in mentation and behavior   Position to facilitate oxygenation and minimize respiratory effort   Encourage broncho-pulmonary hygiene including cough, deep breathe, incentive spirometry   Assess the need for suctioning and aspirate as needed   Assess and instruct to report shortness of breath or any respiratory difficulty   Respiratory therapy support as indicated  9/26/2023 2053 by Sunshine Amaro RN  Outcome: Progressing  Flowsheets (Taken 9/26/2023 2000)  Achieves optimal ventilation and oxygenation:   Assess for changes in respiratory status   Assess for changes in mentation and behavior   Position to facilitate oxygenation and minimize respiratory effort   Oxygen supplementation based on oxygen saturation or arterial blood gases   Assess the need for suctioning and aspirate as needed     Problem: Pain  Goal: Verbalizes/displays adequate comfort level or baseline comfort level  9/27/2023 0951 by Josee Ness RN  Outcome: Progressing  9/26/2023 2053 by Sunshine Amaro RN  Outcome:

## 2023-09-27 NOTE — PROGRESS NOTES
09/26/23 2316   NIV Type   Mode Bilevel   Mask Type Full face mask   Mask Size Small   Assessment   Pulse 87   Respirations 30   SpO2 100 %   Settings/Measurements   IPAP 16 cmH20   CPAP/EPAP 8 cmH2O   Vt (Measured) 464 mL   Rate Ordered 14   FiO2  55 %

## 2023-09-27 NOTE — PROGRESS NOTES
4 Eyes Skin Assessment     NAME:  Jocelin Obando  YOB: 1955  MEDICAL RECORD NUMBER:  [id number]    The patient is being assessed for  Shift Handoff    I agree that at least one RN has performed a thorough Head to Toe Skin Assessment on the patient. ALL assessment sites listed below have been assessed. Areas assessed by both nurses:    Head, Face, Ears, Shoulders, Back, Chest, Arms, Elbows, Hands, Sacrum. Buttock, Coccyx, Ischium, Legs. Feet and Heels, and Under Medical Devices         Does the Patient have a Wound?  No noted wound(s)       Amos Prevention initiated by RN: Yes  Wound Care Orders initiated by RN: No    Pressure Injury (Stage 3,4, Unstageable, DTI, NWPT, and Complex wounds) if present, place Wound referral order by RN under : No    New Ostomies, if present place, Ostomy referral order under : No     Nurse 1 eSignature: Electronically signed by Luana Almaraz RN on 9/26/23 at 8:53 PM EDT    **SHARE this note so that the co-signing nurse can place an eSignature**    Nurse 2 eSignature: Electronically signed by Nohelia Durbin RN on 9/26/23 at 8:55 PM EDT

## 2023-09-27 NOTE — PROGRESS NOTES
Shift handoff report given to Rodrigo Maher RN at bedside. Pt is awake and alert  IV handoff completed. Call light within reach, bed in lowest position, bed alarm on. End of shift checks completed. Pt has been free of falls for duration of shift. Freida Bedolla

## 2023-09-27 NOTE — PROGRESS NOTES
09/27/23 0306   NIV Type   Mode Bilevel   Mask Type Full face mask   Mask Size Small   Assessment   Respirations (!) 38   SpO2 92 %   Settings/Measurements   IPAP 16 cmH20   CPAP/EPAP 8 cmH2O   Vt (Measured) 491 mL   Rate Ordered 14   FiO2  55 %

## 2023-09-27 NOTE — PROGRESS NOTES
Reassessment completed (see Flowsheet). All ICU lines remain intact, ICU monitoring continued    Infusing:  Precedex 0.3    pt remains on bedrest, repositioned as tolerated. .     Lung sounds Diminished, use of high flow nasal canula, non re breather, see flow sheets      Continuing to monitor.

## 2023-09-27 NOTE — PROGRESS NOTES
09/27/23 1927   NIV Type   NIV Started/Stopped On   Equipment Type v60   Mode Bilevel   Mask Type Full face mask   Mask Size Small   Assessment   Pulse 71   Respirations 23   SpO2 100 %   Settings/Measurements   IPAP 16 cmH20   CPAP/EPAP 8 cmH2O   Vt (Measured) 562 mL   Rate Ordered 14   FiO2  55 %   I Time/ I Time % 0.9 s   Minute Volume (L/min) 15.4 Liters   Mask Leak (lpm) 28 lpm   Patient's Home Machine No   Alarm Settings   Alarms On Y   Low Pressure (cmH2O) 5 cmH2O   High Pressure (cmH2O) 35 cmH2O   Delay Alarm 20 sec(s)   RR Low (bpm) 14   RR High (bpm) 50 br/min

## 2023-09-27 NOTE — PROGRESS NOTES
History of COPD 02/20/2023    Atrial fibrillation with RVR ([address]) 01/17/2023    Chronic hypoxemic respiratory failure ([address]) 01/17/2023    Dependence on supplemental oxygen 12/11/2022    Severe protein-calorie malnutrition ([address]) 12/05/2022    SVT (supraventricular tachycardia) ([address]) 10/22/2022    PSVT (paroxysmal supraventricular tachycardia) ([address]) 10/21/2022    PAF (paroxysmal atrial fibrillation) ([address]) 10/21/2022    Diarrhea 10/20/2022    COVID-19 09/15/2022    Pneumonia due to COVID-19 virus 09/10/2022    H/O Spinal surgery 08/16/2022    Hypomagnesemia 07/15/2022    Depression 07/15/2022    Chronic diastolic congestive heart failure ([address]) 07/15/2022    Burst fracture of lumbar vertebra ([address]) 05/29/2022    Rib pain on left side     Elevated lactic acid level     Fracture of multiple ribs of left side 04/19/2022    COPD exacerbation ([address]) 09/26/2023    Chronic congestive heart failure ([address]) 08/22/2023    S/P chest tube placement ([address]) 07/31/2023    Combined hyperlipidemia associated with type 2 diabetes mellitus ([address]) 07/31/2023    Coagulation defect ([address]) 07/31/2023    Acute pain of right shoulder 07/28/2023    Pulmonary fibrosis ([address]) 07/24/2023    Weakness 07/23/2023    Hypernatremia 07/20/2023    Acute respiratory failure ([address]) 07/19/2023    Absolute anemia 07/16/2023    Steroid-induced hyperglycemia 07/16/2023    Respiratory failure ([address]) 06/24/2023    Acute and chr resp failure, unsp w hypoxia or hypercapnia ([address]) 06/23/2023    Acute respiratory failure with hypoxia ([address]) 06/08/2023    ILD (interstitial lung disease) ([address]) 06/08/2023    Acute on chronic respiratory failure with hypoxia ([address]) 05/04/2023    VIRGINIE (acute kidney injury) ([address])     Lumbar radiculopathy     Toxic metabolic encephalopathy 92/68/2907    GERD (gastroesophageal reflux disease)     Left wrist pain 07/02/2020    Left wrist tendinitis 07/02/2020    Ulnar neuropathy at elbow of left upper extremity 01/23/2020    Numbness and tingling in left hand 01/10/2020    Hyponatremia 12/23/2019    Type 2 diabetes mellitus without complication ([address]) 66/58/5526    Cryptogenic organizing pneumonia ([address]) 09/03/2019    Shortness of breath     Restrictive lung disease     Atypical pneumonia     Acute on chronic diastolic CHF (congestive heart failure) (HCC)     Class 2 obesity with body mass index (BMI) of 39.0 to 39.9 in adult     Pneumonia of both lungs due to infectious organism 06/16/2019    Mild single current episode of major depressive disorder ([address]) 05/02/2019    Anxiety 05/02/2019    COPD with acute exacerbation ([address]) 10/10/2018    Hypothyroidism 07/31/2018    Fatigue 04/25/2016    Migraine 08/04/2014    Syncope 08/04/2014    HTN (hypertension) 04/08/2014    Hyperlipidemia with target LDL less than 130 04/08/2014    Hypokalemia 04/08/2014    Insomnia 04/08/2014    Trochanteric bursitis of both hips 04/08/2014     Acute on chronic hypoxic and hypercarbic resp failure   Secondary to COVID-19 infection in the setting of ILD and cryptogenic organizing pneumonia  Patient presented with nonrebreather  Given hypercapnia will transition to BiPAP  Now on Vapotherm and BiPAP  Pulmonary consulted     Acute COVID-19 infection:  COVID-19 protocol initiated  Decadron 6 mg daily   Droplet plus isolation     Acute ILD exacerbation:  Pulmonary consulted, much appreciated  Continue steroids, IBD   Levaquin  Sputum cultures       Paroxysmal Atrial fibrillation  Continue cardizem  Not on [facility]     GERD: Continue home medication    Essential Hypertension: Reviewed patient's medications and plan is to continue home medication    Depression  Continue zoloft and seroquel    Hypothyroidism: Continue home medication    Diet: gen diet   Full code  DVT Prophylaxis: Hazel Singh MD

## 2023-09-27 NOTE — PROGRESS NOTES
Pt continues with dyspnea with conversation and movement. Purewick catheter changed, pt bathed, tolerating PO, denies pain. RR 25-30 otherwise VSS. Standard fall precautions in place.

## 2023-09-27 NOTE — PROGRESS NOTES
Dr. Van Ruffin contacted regarding diet. Updated regarding respiratory status. Orders received to advance diet as tolerated.

## 2023-09-27 NOTE — PROGRESS NOTES
Care rounds completed with Dr. Caridad Hooker and multidisciplinary team. Reviewed labs, meds, VS, assessment, & plan of care for today.  -Add Seroquel- with continued high anxiety.  -informed of patient complaint of Nose Bleeding- add Afrin.   -encourage patient to not be on Vapotherm, and NRB    See dictated note and new orders for details.

## 2023-09-27 NOTE — PLAN OF CARE
Pt resting in bed, A&O x4. Pt on Vapotherm 35%/70L and using NRB at 10L when anxious and increased SOB, SpO2 98%. Peripheral IV's patent with precedex infusing per mar. Carlo patent and brief on pt. Pt denies pain, VSS. Reviewed with pt plan of care for shift and medications, all questions answered, pt voices no concerns. Problem: Discharge Planning  Goal: Discharge to home or other facility with appropriate resources  Outcome: Progressing  Flowsheets (Taken 9/26/2023 2000)  Discharge to home or other facility with appropriate resources:   Identify barriers to discharge with patient and caregiver   Arrange for needed discharge resources and transportation as appropriate     Problem: Skin/Tissue Integrity  Goal: Absence of new skin breakdown  Description: 1. Monitor for areas of redness and/or skin breakdown  2. Assess vascular access sites hourly  3. Every 4-6 hours minimum:  Change oxygen saturation probe site  4. Every 4-6 hours:  If on nasal continuous positive airway pressure, respiratory therapy assess nares and determine need for appliance change or resting period.   Outcome: Progressing     Problem: Respiratory - Adult  Goal: Achieves optimal ventilation and oxygenation  Outcome: Progressing  Flowsheets (Taken 9/26/2023 2000)  Achieves optimal ventilation and oxygenation:   Assess for changes in respiratory status   Assess for changes in mentation and behavior   Position to facilitate oxygenation and minimize respiratory effort   Oxygen supplementation based on oxygen saturation or arterial blood gases   Assess the need for suctioning and aspirate as needed     Problem: Pain  Goal: Verbalizes/displays adequate comfort level or baseline comfort level  Outcome: Progressing  Flowsheets (Taken 9/26/2023 2000)  Verbalizes/displays adequate comfort level or baseline comfort level:   Encourage patient to monitor pain and request assistance   Assess pain using appropriate pain scale   Administer analgesics based on type and severity of pain and evaluate response   Implement non-pharmacological measures as appropriate and evaluate response     Problem: Safety - Adult  Goal: Free from fall injury  Outcome: Progressing     Problem: ABCDS Injury Assessment  Goal: Absence of physical injury  Outcome: Progressing     Problem: Chronic Conditions and Co-morbidities  Goal: Patient's chronic conditions and co-morbidity symptoms are monitored and maintained or improved  Outcome: Progressing  Flowsheets (Taken 9/26/2023 2000)  Care Plan - Patient's Chronic Conditions and Co-Morbidity Symptoms are Monitored and Maintained or Improved:   Monitor and assess patient's chronic conditions and comorbid symptoms for stability, deterioration, or improvement   Update acute care plan with appropriate goals if chronic or comorbid symptoms are exacerbated and prevent overall improvement and discharge

## 2023-09-27 NOTE — PROGRESS NOTES
Pt medicated for L arm pain  7/10 with Tylenol and Ativan 0.25 mg po. Pt on Vapotherm 70 % 35 Liters while off BiPap.

## 2023-09-28 PROBLEM — D69.6 THROMBOCYTOPENIA (HCC): Status: ACTIVE | Noted: 2023-09-28

## 2023-09-28 LAB
ANION GAP SERPL CALCULATED.3IONS-SCNC: 9 MMOL/L (ref 3–16)
BASOPHILS # BLD: 0 K/UL (ref 0–0.2)
BASOPHILS NFR BLD: 0.1 %
BUN SERPL-MCNC: 15 MG/DL (ref 7–20)
CALCIUM SERPL-MCNC: 8.6 MG/DL (ref 8.3–10.6)
CHLORIDE SERPL-SCNC: 102 MMOL/L (ref 99–110)
CO2 SERPL-SCNC: 28 MMOL/L (ref 21–32)
CREAT SERPL-MCNC: <0.5 MG/DL (ref 0.6–1.2)
DEPRECATED RDW RBC AUTO: 14.4 % (ref 12.4–15.4)
EOSINOPHIL # BLD: 0 K/UL (ref 0–0.6)
EOSINOPHIL NFR BLD: 0 %
GFR SERPLBLD CREATININE-BSD FMLA CKD-EPI: >60 ML/MIN/{1.73_M2}
GLUCOSE BLD-MCNC: 186 MG/DL (ref 70–99)
GLUCOSE BLD-MCNC: 190 MG/DL (ref 70–99)
GLUCOSE BLD-MCNC: 217 MG/DL (ref 70–99)
GLUCOSE BLD-MCNC: 219 MG/DL (ref 70–99)
GLUCOSE SERPL-MCNC: 234 MG/DL (ref 70–99)
HCT VFR BLD AUTO: 32.8 % (ref 36–48)
HGB BLD-MCNC: 10.7 G/DL (ref 12–16)
LYMPHOCYTES # BLD: 0.3 K/UL (ref 1–5.1)
LYMPHOCYTES NFR BLD: 5.1 %
MAGNESIUM SERPL-MCNC: 2.1 MG/DL (ref 1.8–2.4)
MCH RBC QN AUTO: 27.5 PG (ref 26–34)
MCHC RBC AUTO-ENTMCNC: 32.6 G/DL (ref 31–36)
MCV RBC AUTO: 84.3 FL (ref 80–100)
MONOCYTES # BLD: 0.3 K/UL (ref 0–1.3)
MONOCYTES NFR BLD: 4.6 %
NEUTROPHILS # BLD: 5.5 K/UL (ref 1.7–7.7)
NEUTROPHILS NFR BLD: 90.2 %
PERFORMED ON: ABNORMAL
PHOSPHATE SERPL-MCNC: 3.2 MG/DL (ref 2.5–4.9)
PLATELET # BLD AUTO: 130 K/UL (ref 135–450)
PMV BLD AUTO: 7.7 FL (ref 5–10.5)
POTASSIUM SERPL-SCNC: 3.9 MMOL/L (ref 3.5–5.1)
RBC # BLD AUTO: 3.89 M/UL (ref 4–5.2)
SODIUM SERPL-SCNC: 139 MMOL/L (ref 136–145)
WBC # BLD AUTO: 6.1 K/UL (ref 4–11)

## 2023-09-28 PROCEDURE — 94640 AIRWAY INHALATION TREATMENT: CPT

## 2023-09-28 PROCEDURE — 36415 COLL VENOUS BLD VENIPUNCTURE: CPT

## 2023-09-28 PROCEDURE — 6370000000 HC RX 637 (ALT 250 FOR IP): Performed by: INTERNAL MEDICINE

## 2023-09-28 PROCEDURE — 84100 ASSAY OF PHOSPHORUS: CPT

## 2023-09-28 PROCEDURE — 2000000000 HC ICU R&B

## 2023-09-28 PROCEDURE — 6360000002 HC RX W HCPCS: Performed by: INTERNAL MEDICINE

## 2023-09-28 PROCEDURE — 99233 SBSQ HOSP IP/OBS HIGH 50: CPT | Performed by: INTERNAL MEDICINE

## 2023-09-28 PROCEDURE — 94660 CPAP INITIATION&MGMT: CPT

## 2023-09-28 PROCEDURE — 80048 BASIC METABOLIC PNL TOTAL CA: CPT

## 2023-09-28 PROCEDURE — 85025 COMPLETE CBC W/AUTO DIFF WBC: CPT

## 2023-09-28 PROCEDURE — 2580000003 HC RX 258: Performed by: INTERNAL MEDICINE

## 2023-09-28 PROCEDURE — 83735 ASSAY OF MAGNESIUM: CPT

## 2023-09-28 PROCEDURE — 2700000000 HC OXYGEN THERAPY PER DAY

## 2023-09-28 PROCEDURE — 99291 CRITICAL CARE FIRST HOUR: CPT | Performed by: INTERNAL MEDICINE

## 2023-09-28 PROCEDURE — 94761 N-INVAS EAR/PLS OXIMETRY MLT: CPT

## 2023-09-28 RX ORDER — TRAMADOL HYDROCHLORIDE 50 MG/1
50 TABLET ORAL EVERY 6 HOURS PRN
Status: DISCONTINUED | OUTPATIENT
Start: 2023-09-28 | End: 2023-10-05 | Stop reason: HOSPADM

## 2023-09-28 RX ORDER — IPRATROPIUM BROMIDE AND ALBUTEROL SULFATE 2.5; .5 MG/3ML; MG/3ML
1 SOLUTION RESPIRATORY (INHALATION)
Status: DISCONTINUED | OUTPATIENT
Start: 2023-09-28 | End: 2023-10-05 | Stop reason: HOSPADM

## 2023-09-28 RX ORDER — INSULIN LISPRO 100 [IU]/ML
0-16 INJECTION, SOLUTION INTRAVENOUS; SUBCUTANEOUS
Status: DISCONTINUED | OUTPATIENT
Start: 2023-09-28 | End: 2023-10-05 | Stop reason: HOSPADM

## 2023-09-28 RX ORDER — INSULIN LISPRO 100 [IU]/ML
0-4 INJECTION, SOLUTION INTRAVENOUS; SUBCUTANEOUS NIGHTLY
Status: DISCONTINUED | OUTPATIENT
Start: 2023-09-28 | End: 2023-10-05 | Stop reason: HOSPADM

## 2023-09-28 RX ORDER — SENNA AND DOCUSATE SODIUM 50; 8.6 MG/1; MG/1
2 TABLET, FILM COATED ORAL 2 TIMES DAILY
Status: DISCONTINUED | OUTPATIENT
Start: 2023-09-28 | End: 2023-10-05 | Stop reason: HOSPADM

## 2023-09-28 RX ORDER — LEVOFLOXACIN 750 MG/1
750 TABLET ORAL DAILY
Status: COMPLETED | OUTPATIENT
Start: 2023-09-29 | End: 2023-10-02

## 2023-09-28 RX ADMIN — LEVOTHYROXINE SODIUM 100 MCG: 100 TABLET ORAL at 08:40

## 2023-09-28 RX ADMIN — SENNOSIDES AND DOCUSATE SODIUM 2 TABLET: 8.6; 5 TABLET ORAL at 10:32

## 2023-09-28 RX ADMIN — IPRATROPIUM BROMIDE AND ALBUTEROL SULFATE 1 DOSE: 2.5; .5 SOLUTION RESPIRATORY (INHALATION) at 08:14

## 2023-09-28 RX ADMIN — IPRATROPIUM BROMIDE AND ALBUTEROL SULFATE 1 DOSE: .5; 2.5 SOLUTION RESPIRATORY (INHALATION) at 19:51

## 2023-09-28 RX ADMIN — MUPIROCIN: 20 OINTMENT TOPICAL at 19:33

## 2023-09-28 RX ADMIN — Medication 2 SPRAY: at 08:41

## 2023-09-28 RX ADMIN — ENOXAPARIN SODIUM 30 MG: 100 INJECTION SUBCUTANEOUS at 19:39

## 2023-09-28 RX ADMIN — PANTOPRAZOLE SODIUM 40 MG: 40 TABLET, DELAYED RELEASE ORAL at 10:33

## 2023-09-28 RX ADMIN — IPRATROPIUM BROMIDE AND ALBUTEROL SULFATE 1 DOSE: .5; 2.5 SOLUTION RESPIRATORY (INHALATION) at 13:44

## 2023-09-28 RX ADMIN — DILTIAZEM HYDROCHLORIDE 240 MG: 120 CAPSULE, COATED, EXTENDED RELEASE ORAL at 08:40

## 2023-09-28 RX ADMIN — SERTRALINE 100 MG: 100 TABLET, FILM COATED ORAL at 08:40

## 2023-09-28 RX ADMIN — TRAMADOL HYDROCHLORIDE 50 MG: 50 TABLET ORAL at 17:42

## 2023-09-28 RX ADMIN — SENNOSIDES AND DOCUSATE SODIUM 2 TABLET: 8.6; 5 TABLET ORAL at 19:39

## 2023-09-28 RX ADMIN — QUETIAPINE FUMARATE 12.5 MG: 25 TABLET ORAL at 19:39

## 2023-09-28 RX ADMIN — GLYCERIN 2 G: 2 SUPPOSITORY RECTAL at 12:03

## 2023-09-28 RX ADMIN — DEXAMETHASONE 6 MG: 4 TABLET ORAL at 10:32

## 2023-09-28 RX ADMIN — INSULIN LISPRO 4 UNITS: 100 INJECTION, SOLUTION INTRAVENOUS; SUBCUTANEOUS at 11:54

## 2023-09-28 RX ADMIN — QUETIAPINE FUMARATE 12.5 MG: 25 TABLET ORAL at 08:40

## 2023-09-28 RX ADMIN — LORAZEPAM 0.5 MG: 0.5 TABLET ORAL at 17:44

## 2023-09-28 RX ADMIN — SODIUM CHLORIDE, PRESERVATIVE FREE 10 ML: 5 INJECTION INTRAVENOUS at 08:43

## 2023-09-28 RX ADMIN — MUPIROCIN: 20 OINTMENT TOPICAL at 08:41

## 2023-09-28 RX ADMIN — SODIUM CHLORIDE, PRESERVATIVE FREE 10 ML: 5 INJECTION INTRAVENOUS at 19:30

## 2023-09-28 RX ADMIN — LORAZEPAM 0.5 MG: 0.5 TABLET ORAL at 10:33

## 2023-09-28 RX ADMIN — ATORVASTATIN CALCIUM 40 MG: 40 TABLET, FILM COATED ORAL at 08:40

## 2023-09-28 RX ADMIN — ENOXAPARIN SODIUM 30 MG: 100 INJECTION SUBCUTANEOUS at 08:39

## 2023-09-28 ASSESSMENT — PAIN DESCRIPTION - LOCATION: LOCATION: BACK

## 2023-09-28 ASSESSMENT — PAIN SCALES - GENERAL
PAINLEVEL_OUTOF10: 0
PAINLEVEL_OUTOF10: 0
PAINLEVEL_OUTOF10: 5
PAINLEVEL_OUTOF10: 0
PAINLEVEL_OUTOF10: 1
PAINLEVEL_OUTOF10: 0

## 2023-09-28 ASSESSMENT — PAIN - FUNCTIONAL ASSESSMENT: PAIN_FUNCTIONAL_ASSESSMENT: PREVENTS OR INTERFERES SOME ACTIVE ACTIVITIES AND ADLS

## 2023-09-28 ASSESSMENT — PAIN DESCRIPTION - DESCRIPTORS: DESCRIPTORS: ACHING

## 2023-09-28 ASSESSMENT — PAIN DESCRIPTION - ORIENTATION: ORIENTATION: LOWER

## 2023-09-28 NOTE — PROGRESS NOTES
Reassessment completed. No major changes. Pt has been sleeping soundly, on Vapotherm still 30 L 55%. SpO2 97%. Precedex infusing at .3 mcg/kg/h. U/O 500 ml.      Electronically signed by Chong Fracnis RN on 9/28/2023 at 2:40 AM

## 2023-09-28 NOTE — PROGRESS NOTES
4 Eyes Skin Assessment     NAME:  Fe Obando  YOB: 1955  MEDICAL RECORD NUMBER:  [id number]    The patient is being assessed for  Other Limited mobility, bedrest    I agree that at least one RN has performed a thorough Head to Toe Skin Assessment on the patient. ALL assessment sites listed below have been assessed. Areas assessed by both nurses:    Head, Face, Ears, Shoulders, Back, Chest, Arms, Elbows, Hands, Sacrum. Buttock, Coccyx, Ischium, Legs. Feet and Heels, and Under Medical Devices         Does the Patient have a Wound?  No noted wound(s)       Amos Prevention initiated by RN: Yes  Wound Care Orders initiated by RN: No    Pressure Injury (Stage 3,4, Unstageable, DTI, NWPT, and Complex wounds) if present, place Wound referral order by RN under : No    New Ostomies, if present place, Ostomy referral order under : No     Nurse 1 eSignature: Electronically signed by Shane Linda RN on 9/28/23 at 3:32 AM EDT    **SHARE this note so that the co-signing nurse can place an eSignature**    Nurse 2 eSignature: {Esignature:[id number]}

## 2023-09-28 NOTE — PLAN OF CARE
Problem: Skin/Tissue Integrity  Goal: Absence of new skin breakdown  Description: 1. Monitor for areas of redness and/or skin breakdown  2. Assess vascular access sites hourly  3. Every 4-6 hours minimum:  Change oxygen saturation probe site  4. Every 4-6 hours:  If on nasal continuous positive airway pressure, respiratory therapy assess nares and determine need for appliance change or resting period. Outcome: Progressing     Problem: Respiratory - Adult  Goal: Achieves optimal ventilation and oxygenation  Outcome: Progressing     Problem: Pain  Goal: Verbalizes/displays adequate comfort level or baseline comfort level  Outcome: Progressing  Flowsheets  Taken 9/28/2023 0400 by Felice Burkitt, RN  Verbalizes/displays adequate comfort level or baseline comfort level: Encourage patient to monitor pain and request assistance  Taken 9/28/2023 0000 by Felice Burkitt, RN  Verbalizes/displays adequate comfort level or baseline comfort level: Encourage patient to monitor pain and request assistance     Problem: Safety - Adult  Goal: Free from fall injury  Outcome: Progressing     Problem: Cardiovascular - Adult  Goal: Maintains optimal cardiac output and hemodynamic stability  9/28/2023 1021 by Sunday Jade RN  Outcome: Progressing  9/27/2023 2153 by Felice Burkitt, RN  Note:   HEART FAILURE CARE PLAN:    Comorbidities Reviewed: Yes   Patient has a past medical history of A-fib ([address]), Acute cystitis without hematuria, Anxiety, CHF (congestive heart failure) ([address]), COPD (chronic obstructive pulmonary disease) ([address]), Cryptogenic organizing pneumonia ([address]), Depression, Diabetes mellitus ([address]), GERD (gastroesophageal reflux disease), Hyperlipidemia, Hypertension, On home oxygen therapy, Rash, and Thyroid disease. ECHOCARDIOGRAM Reviewed: Yes   Patient's Ejection Fraction (EF) is greater than 40%    Weights Reviewed:  Yes   Admission weight: 124 lb (56.2 kg)   Wt Readings from Last 3 Encounters:   09/27/23 118 lb 3.2 oz (53.6 kg)   09/24/23 124 lb (56.2 kg)   08/23/23 123 lb 6.4 oz (56 kg)     Intake & Output Reviewed: Yes     Intake/Output Summary (Last 24 hours) at 9/27/2023 2150  Last data filed at 9/27/2023 1610  Gross per 24 hour   Intake 538.23 ml   Output 1725 ml   Net -1186.77 ml     Medications Reviewed: Yes   SCHEDULED HOSPITAL MEDICATIONS:   potassium chloride  30 mEq Oral Once    QUEtiapine  12.5 mg Oral BID    [START ON 9/28/2023] insulin lispro  0-4 Units SubCUTAneous TID     insulin lispro  0-4 Units SubCUTAneous Nightly    sodium chloride flush  10 mL IntraVENous 2 times per day    nicotine  1 patch TransDERmal Daily    dexamethasone  6 mg IntraVENous Q24H    enoxaparin  30 mg SubCUTAneous BID    atorvastatin  40 mg Oral Daily    dilTIAZem  240 mg Oral Daily    levothyroxine  100 mcg Oral Daily    pantoprazole  40 mg Oral QAM AC    sertraline  100 mg Oral Daily    ipratropium 0.5 mg-albuterol 2.5 mg  1 Dose Inhalation 4x Daily RT    methylPREDNISolone  40 mg IntraVENous Q12H    mupirocin   Each Nostril BID    levofloxacin  750 mg IntraVENous Q24H     ACE/ARB/ARNI is REQUIRED for EF </= 63% SYSTOLIC FAILURE:   ACE[de-identified] N/A  ARB[de-identified] N/A  ARNI[de-identified] N/A    Evidenced-Based Beta Blocker is REQUIRED for EF </= 89% SYSTOLIC FAILURE:   [de-identified] N/A    Diuretics:  [de-identified]   , Furosemide, Torsemide, Spironolactone, Metalozone, None, and N/A    Diet Reviewed: Yes   ADULT DIET; Regular    Goal of Care Reviewed: Yes   Patient and/or Family's stated Goal of Care this Admission: reduce shortness of breath, increase activity tolerance, better understand heart failure and disease management, be more comfortable, and reduce lower extremity edema prior to discharge.

## 2023-09-28 NOTE — PROGRESS NOTES
BG rechecked. 215. No insulin covg required.      Electronically signed by Juan Oneal RN on 9/27/2023 at 8:44 PM

## 2023-09-28 NOTE — PLAN OF CARE
Problem: Cardiovascular - Adult  Goal: Maintains optimal cardiac output and hemodynamic stability  Note:   HEART FAILURE CARE PLAN:    Comorbidities Reviewed: Yes   Patient has a past medical history of A-fib ([address]), Acute cystitis without hematuria, Anxiety, CHF (congestive heart failure) ([address]), COPD (chronic obstructive pulmonary disease) ([address]), Cryptogenic organizing pneumonia ([address]), Depression, Diabetes mellitus ([address]), GERD (gastroesophageal reflux disease), Hyperlipidemia, Hypertension, On home oxygen therapy, Rash, and Thyroid disease. ECHOCARDIOGRAM Reviewed: Yes   Patient's Ejection Fraction (EF) is greater than 40% Echo in May 2023    Weights Reviewed:  Yes   Admission weight: 124 lb (56.2 kg)   Wt Readings from Last 3 Encounters:   09/27/23 118 lb 3.2 oz (53.6 kg)   09/24/23 124 lb (56.2 kg)   08/23/23 123 lb 6.4 oz (56 kg)     Intake & Output Reviewed: Yes     Intake/Output Summary (Last 24 hours) at 9/27/2023 2150  Last data filed at 9/27/2023 1610  Gross per 24 hour   Intake 538.23 ml   Output 1725 ml   Net -1186.77 ml     Medications Reviewed: Yes   SCHEDULED HOSPITAL MEDICATIONS:   potassium chloride  30 mEq Oral Once    QUEtiapine  12.5 mg Oral BID    [START ON 9/28/2023] insulin lispro  0-4 Units SubCUTAneous TID     insulin lispro  0-4 Units SubCUTAneous Nightly    sodium chloride flush  10 mL IntraVENous 2 times per day    nicotine  1 patch TransDERmal Daily    dexamethasone  6 mg IntraVENous Q24H    enoxaparin  30 mg SubCUTAneous BID    atorvastatin  40 mg Oral Daily    dilTIAZem  240 mg Oral Daily    levothyroxine  100 mcg Oral Daily    pantoprazole  40 mg Oral QAM AC    sertraline  100 mg Oral Daily    ipratropium 0.5 mg-albuterol 2.5 mg  1 Dose Inhalation 4x Daily RT    methylPREDNISolone  40 mg IntraVENous Q12H    mupirocin   Each Nostril BID    levofloxacin  750 mg IntraVENous Q24H     ACE/ARB/ARNI is REQUIRED for EF </= 07% SYSTOLIC FAILURE:   ACE[de-identified] N/A  ARB[de-identified] N/A  ARNI[de-identified]

## 2023-09-28 NOTE — PLAN OF CARE
Problem: Discharge Planning  Goal: Discharge to home or other facility with appropriate resources  Outcome: Progressing     Problem: Skin/Tissue Integrity  Goal: Absence of new skin breakdown  Description: 1. Monitor for areas of redness and/or skin breakdown  2. Assess vascular access sites hourly  3. Every 4-6 hours minimum:  Change oxygen saturation probe site  4. Every 4-6 hours:  If on nasal continuous positive airway pressure, respiratory therapy assess nares and determine need for appliance change or resting period.   9/28/2023 1946 by Dylon Armendariz RN  Outcome: Progressing  9/28/2023 1021 by Eugene Patterson RN  Outcome: Progressing     Problem: Respiratory - Adult  Goal: Achieves optimal ventilation and oxygenation  9/28/2023 1946 by Dylon Armendariz RN  Outcome: Progressing  9/28/2023 1021 by Eugene Patterson RN  Outcome: Progressing     Problem: Pain  Goal: Verbalizes/displays adequate comfort level or baseline comfort level  9/28/2023 1946 by Dylon Armendariz RN  Outcome: Progressing  9/28/2023 1021 by Eugene Patterson RN  Outcome: Progressing  Flowsheets  Taken 9/28/2023 0400 by Dylon Armendariz RN  Verbalizes/displays adequate comfort level or baseline comfort level: Encourage patient to monitor pain and request assistance  Taken 9/28/2023 0000 by Dylon Armendariz RN  Verbalizes/displays adequate comfort level or baseline comfort level: Encourage patient to monitor pain and request assistance     Problem: Safety - Adult  Goal: Free from fall injury  9/28/2023 1946 by Dylon Armendariz RN  Outcome: Progressing  9/28/2023 1021 by Eugene Patterson RN  Outcome: Progressing     Problem: ABCDS Injury Assessment  Goal: Absence of physical injury  Outcome: Progressing     Problem: Chronic Conditions and Co-morbidities  Goal: Patient's chronic conditions and co-morbidity symptoms are monitored and maintained or improved  Outcome: Progressing     Problem: Cardiovascular - Adult  Goal: Maintains optimal cardiac output and hemodynamic stability  9/28/2023 1946 by Nieves Salinas RN  Outcome: Progressing  9/28/2023 1021 by Shaquille Chopra RN  Outcome: Progressing  Goal: Absence of cardiac dysrhythmias or at baseline  Outcome: Progressing    HEART FAILURE CARE PLAN:    Comorbidities Reviewed: Yes   Patient has a past medical history of A-fib ([address]), Acute cystitis without hematuria, Anxiety, CHF (congestive heart failure) ([address]), COPD (chronic obstructive pulmonary disease) ([address]), Cryptogenic organizing pneumonia ([address]), Depression, Diabetes mellitus ([address]), GERD (gastroesophageal reflux disease), Hyperlipidemia, Hypertension, On home oxygen therapy, Rash, and Thyroid disease. ECHOCARDIOGRAM Reviewed: Yes   Patient's Ejection Fraction (EF) is greater than 40%    Weights Reviewed:  Yes   Admission weight: 124 lb (56.2 kg)   Wt Readings from Last 3 Encounters:   09/28/23 123 lb (55.8 kg)   09/24/23 124 lb (56.2 kg)   08/23/23 123 lb 6.4 oz (56 kg)     Intake & Output Reviewed: Yes     Intake/Output Summary (Last 24 hours) at 9/28/2023 1947  Last data filed at 9/28/2023 1900  Gross per 24 hour   Intake 1280.2 ml   Output 1455 ml   Net -174.8 ml     Medications Reviewed: Yes   SCHEDULED HOSPITAL MEDICATIONS:   ipratropium 0.5 mg-albuterol 2.5 mg  1 Dose Inhalation TID RT    [START ON 9/29/2023] levoFLOXacin  750 mg Oral Daily    insulin lispro  0-16 Units SubCUTAneous TID WC    insulin lispro  0-4 Units SubCUTAneous Nightly    dexamethasone  6 mg Oral Daily    sennosides-docusate sodium  2 tablet Oral BID    potassium chloride  30 mEq Oral Once    QUEtiapine  12.5 mg Oral BID    sodium chloride flush  10 mL IntraVENous 2 times per day    enoxaparin  30 mg SubCUTAneous BID    atorvastatin  40 mg Oral Daily    dilTIAZem  240 mg Oral Daily    levothyroxine  100 mcg Oral Daily    pantoprazole  40 mg Oral QAM AC    sertraline  100 mg Oral Daily    mupirocin   Each Nostril BID     ACE/ARB/ARNI is REQUIRED for EF </= 57% SYSTOLIC FAILURE:   ACE[de-identified] N/A  ARB[de-identified]

## 2023-09-28 NOTE — PROGRESS NOTES
Rounds completed with Dr. Osorio Ramos  Orders received and carried out, see new order. Pt sats dropped while using bedpan, bi pap applied. Suppository requested, ordered via Dr. Jose Martin Solis. Pt educated on plan of care for the day and medication changes.

## 2023-09-28 NOTE — PLAN OF CARE
Problem: Discharge Planning  Goal: Discharge to home or other facility with appropriate resources  Outcome: Progressing  Flowsheets (Taken 9/27/2023 2011)  Discharge to home or other facility with appropriate resources: Identify barriers to discharge with patient and caregiver     Problem: Skin/Tissue Integrity  Goal: Absence of new skin breakdown  Description: 1. Monitor for areas of redness and/or skin breakdown  2. Assess vascular access sites hourly  3. Every 4-6 hours minimum:  Change oxygen saturation probe site  4. Every 4-6 hours:  If on nasal continuous positive airway pressure, respiratory therapy assess nares and determine need for appliance change or resting period. 9/27/2023 2019 by Corinne Davis RN  Outcome: Progressing  9/27/2023 0951 by Rylee Hebert RN  Outcome: Progressing  Note: Z-Lloyd fluid repositioner provided.     Observe skin folds for moisture     Problem: Respiratory - Adult  Goal: Achieves optimal ventilation and oxygenation  9/27/2023 2019 by Corinne Davis RN  Outcome: Progressing  Flowsheets (Taken 9/27/2023 2011)  Achieves optimal ventilation and oxygenation:   Assess for changes in mentation and behavior   Assess for changes in respiratory status   Position to facilitate oxygenation and minimize respiratory effort   Oxygen supplementation based on oxygen saturation or arterial blood gases   Encourage broncho-pulmonary hygiene including cough, deep breathe, incentive spirometry   Assess and instruct to report shortness of breath or any respiratory difficulty   Respiratory therapy support as indicated  9/27/2023 0951 by Rylee Hebert RN  Outcome: Progressing  Flowsheets (Taken 9/27/2023 0951)  Achieves optimal ventilation and oxygenation:   Assess for changes in respiratory status   Assess for changes in mentation and behavior   Position to facilitate oxygenation and minimize respiratory effort   Encourage broncho-pulmonary hygiene including cough, deep breathe, incentive spirometry

## 2023-09-29 LAB
ANION GAP SERPL CALCULATED.3IONS-SCNC: 8 MMOL/L (ref 3–16)
BASOPHILS # BLD: 0 K/UL (ref 0–0.2)
BASOPHILS NFR BLD: 0.2 %
BUN SERPL-MCNC: 20 MG/DL (ref 7–20)
CALCIUM SERPL-MCNC: 8.5 MG/DL (ref 8.3–10.6)
CHLORIDE SERPL-SCNC: 100 MMOL/L (ref 99–110)
CO2 SERPL-SCNC: 29 MMOL/L (ref 21–32)
CREAT SERPL-MCNC: 0.6 MG/DL (ref 0.6–1.2)
DEPRECATED RDW RBC AUTO: 14.3 % (ref 12.4–15.4)
EOSINOPHIL # BLD: 0 K/UL (ref 0–0.6)
EOSINOPHIL NFR BLD: 0.1 %
GFR SERPLBLD CREATININE-BSD FMLA CKD-EPI: >60 ML/MIN/{1.73_M2}
GLUCOSE BLD-MCNC: 117 MG/DL (ref 70–99)
GLUCOSE BLD-MCNC: 126 MG/DL (ref 70–99)
GLUCOSE BLD-MCNC: 145 MG/DL (ref 70–99)
GLUCOSE BLD-MCNC: 148 MG/DL (ref 70–99)
GLUCOSE SERPL-MCNC: 133 MG/DL (ref 70–99)
HCT VFR BLD AUTO: 33.4 % (ref 36–48)
HGB BLD-MCNC: 11.2 G/DL (ref 12–16)
LYMPHOCYTES # BLD: 0.8 K/UL (ref 1–5.1)
LYMPHOCYTES NFR BLD: 8 %
MAGNESIUM SERPL-MCNC: 1.9 MG/DL (ref 1.8–2.4)
MCH RBC QN AUTO: 27.7 PG (ref 26–34)
MCHC RBC AUTO-ENTMCNC: 33.5 G/DL (ref 31–36)
MCV RBC AUTO: 82.6 FL (ref 80–100)
MONOCYTES # BLD: 0.7 K/UL (ref 0–1.3)
MONOCYTES NFR BLD: 6.9 %
NEUTROPHILS # BLD: 8.2 K/UL (ref 1.7–7.7)
NEUTROPHILS NFR BLD: 84.8 %
PERFORMED ON: ABNORMAL
PHOSPHATE SERPL-MCNC: 3.2 MG/DL (ref 2.5–4.9)
PLATELET # BLD AUTO: 130 K/UL (ref 135–450)
PMV BLD AUTO: 7.7 FL (ref 5–10.5)
POTASSIUM SERPL-SCNC: 3.6 MMOL/L (ref 3.5–5.1)
RBC # BLD AUTO: 4.05 M/UL (ref 4–5.2)
SODIUM SERPL-SCNC: 137 MMOL/L (ref 136–145)
WBC # BLD AUTO: 9.6 K/UL (ref 4–11)

## 2023-09-29 PROCEDURE — 85025 COMPLETE CBC W/AUTO DIFF WBC: CPT

## 2023-09-29 PROCEDURE — 99291 CRITICAL CARE FIRST HOUR: CPT | Performed by: INTERNAL MEDICINE

## 2023-09-29 PROCEDURE — 80048 BASIC METABOLIC PNL TOTAL CA: CPT

## 2023-09-29 PROCEDURE — 94660 CPAP INITIATION&MGMT: CPT

## 2023-09-29 PROCEDURE — 6370000000 HC RX 637 (ALT 250 FOR IP): Performed by: INTERNAL MEDICINE

## 2023-09-29 PROCEDURE — 36415 COLL VENOUS BLD VENIPUNCTURE: CPT

## 2023-09-29 PROCEDURE — 2580000003 HC RX 258: Performed by: INTERNAL MEDICINE

## 2023-09-29 PROCEDURE — 2700000000 HC OXYGEN THERAPY PER DAY

## 2023-09-29 PROCEDURE — 6360000002 HC RX W HCPCS: Performed by: INTERNAL MEDICINE

## 2023-09-29 PROCEDURE — 84100 ASSAY OF PHOSPHORUS: CPT

## 2023-09-29 PROCEDURE — 2000000000 HC ICU R&B

## 2023-09-29 PROCEDURE — 94761 N-INVAS EAR/PLS OXIMETRY MLT: CPT

## 2023-09-29 PROCEDURE — 94640 AIRWAY INHALATION TREATMENT: CPT

## 2023-09-29 PROCEDURE — 83735 ASSAY OF MAGNESIUM: CPT

## 2023-09-29 PROCEDURE — 99233 SBSQ HOSP IP/OBS HIGH 50: CPT | Performed by: INTERNAL MEDICINE

## 2023-09-29 RX ORDER — PIRFENIDONE 267 MG/1
267 TABLET, FILM COATED ORAL 3 TIMES DAILY
Status: DISCONTINUED | OUTPATIENT
Start: 2023-09-29 | End: 2023-10-05 | Stop reason: HOSPADM

## 2023-09-29 RX ORDER — POTASSIUM CHLORIDE 7.45 MG/ML
10 INJECTION INTRAVENOUS
Status: DISCONTINUED | OUTPATIENT
Start: 2023-09-29 | End: 2023-09-29

## 2023-09-29 RX ORDER — POTASSIUM CHLORIDE 750 MG/1
40 TABLET, EXTENDED RELEASE ORAL ONCE
Status: COMPLETED | OUTPATIENT
Start: 2023-09-29 | End: 2023-09-29

## 2023-09-29 RX ORDER — MAGNESIUM SULFATE 1 G/100ML
1000 INJECTION INTRAVENOUS ONCE
Status: COMPLETED | OUTPATIENT
Start: 2023-09-29 | End: 2023-09-29

## 2023-09-29 RX ADMIN — LORAZEPAM 0.5 MG: 0.5 TABLET ORAL at 00:56

## 2023-09-29 RX ADMIN — MUPIROCIN: 20 OINTMENT TOPICAL at 19:37

## 2023-09-29 RX ADMIN — SERTRALINE 100 MG: 100 TABLET, FILM COATED ORAL at 10:19

## 2023-09-29 RX ADMIN — IPRATROPIUM BROMIDE AND ALBUTEROL SULFATE 1 DOSE: .5; 2.5 SOLUTION RESPIRATORY (INHALATION) at 11:42

## 2023-09-29 RX ADMIN — PIRFENIDONE 267 MG: 267 TABLET, FILM COATED ORAL at 20:25

## 2023-09-29 RX ADMIN — LORAZEPAM 0.5 MG: 0.5 TABLET ORAL at 15:36

## 2023-09-29 RX ADMIN — SENNOSIDES AND DOCUSATE SODIUM 2 TABLET: 8.6; 5 TABLET ORAL at 20:24

## 2023-09-29 RX ADMIN — IPRATROPIUM BROMIDE AND ALBUTEROL SULFATE 1 DOSE: .5; 2.5 SOLUTION RESPIRATORY (INHALATION) at 08:03

## 2023-09-29 RX ADMIN — SODIUM CHLORIDE, PRESERVATIVE FREE 10 ML: 5 INJECTION INTRAVENOUS at 10:23

## 2023-09-29 RX ADMIN — MAGNESIUM SULFATE HEPTAHYDRATE 1000 MG: 1 INJECTION, SOLUTION INTRAVENOUS at 10:32

## 2023-09-29 RX ADMIN — IPRATROPIUM BROMIDE AND ALBUTEROL SULFATE 1 DOSE: .5; 2.5 SOLUTION RESPIRATORY (INHALATION) at 20:11

## 2023-09-29 RX ADMIN — ATORVASTATIN CALCIUM 40 MG: 40 TABLET, FILM COATED ORAL at 10:20

## 2023-09-29 RX ADMIN — Medication 2 SPRAY: at 10:18

## 2023-09-29 RX ADMIN — BENZONATATE 100 MG: 100 CAPSULE ORAL at 23:18

## 2023-09-29 RX ADMIN — SODIUM CHLORIDE, PRESERVATIVE FREE 10 ML: 5 INJECTION INTRAVENOUS at 19:37

## 2023-09-29 RX ADMIN — DEXAMETHASONE 6 MG: 4 TABLET ORAL at 10:21

## 2023-09-29 RX ADMIN — POTASSIUM CHLORIDE 40 MEQ: 750 TABLET, EXTENDED RELEASE ORAL at 11:04

## 2023-09-29 RX ADMIN — ENOXAPARIN SODIUM 30 MG: 100 INJECTION SUBCUTANEOUS at 10:18

## 2023-09-29 RX ADMIN — LORAZEPAM 0.5 MG: 0.5 TABLET ORAL at 23:20

## 2023-09-29 RX ADMIN — SENNOSIDES AND DOCUSATE SODIUM 2 TABLET: 8.6; 5 TABLET ORAL at 10:19

## 2023-09-29 RX ADMIN — DILTIAZEM HYDROCHLORIDE 240 MG: 120 CAPSULE, COATED, EXTENDED RELEASE ORAL at 10:20

## 2023-09-29 RX ADMIN — PIRFENIDONE 267 MG: 267 TABLET, FILM COATED ORAL at 15:36

## 2023-09-29 RX ADMIN — LEVOFLOXACIN 750 MG: 750 TABLET, FILM COATED ORAL at 10:20

## 2023-09-29 RX ADMIN — QUETIAPINE FUMARATE 12.5 MG: 25 TABLET ORAL at 20:24

## 2023-09-29 RX ADMIN — PIRFENIDONE 267 MG: 267 TABLET, FILM COATED ORAL at 11:34

## 2023-09-29 RX ADMIN — ONDANSETRON 4 MG: 2 INJECTION INTRAMUSCULAR; INTRAVENOUS at 15:36

## 2023-09-29 RX ADMIN — ENOXAPARIN SODIUM 30 MG: 100 INJECTION SUBCUTANEOUS at 20:25

## 2023-09-29 RX ADMIN — MUPIROCIN: 20 OINTMENT TOPICAL at 10:18

## 2023-09-29 RX ADMIN — QUETIAPINE FUMARATE 12.5 MG: 25 TABLET ORAL at 10:20

## 2023-09-29 RX ADMIN — ACETAMINOPHEN 650 MG: 325 TABLET ORAL at 02:04

## 2023-09-29 ASSESSMENT — PAIN SCALES - GENERAL
PAINLEVEL_OUTOF10: 2
PAINLEVEL_OUTOF10: 0
PAINLEVEL_OUTOF10: 5
PAINLEVEL_OUTOF10: 2

## 2023-09-29 ASSESSMENT — PAIN DESCRIPTION - LOCATION
LOCATION: HEAD
LOCATION: HEAD

## 2023-09-29 ASSESSMENT — PAIN DESCRIPTION - ONSET: ONSET: GRADUAL

## 2023-09-29 ASSESSMENT — PAIN DESCRIPTION - DESCRIPTORS
DESCRIPTORS: ACHING
DESCRIPTORS: ACHING

## 2023-09-29 NOTE — PROGRESS NOTES
respiratory failure with hypoxia ([address]) 05/04/2023    VIRGINIE (acute kidney injury) ([address])     Lumbar radiculopathy     Toxic metabolic encephalopathy 59/80/3889    GERD (gastroesophageal reflux disease)     Left wrist pain 07/02/2020    Left wrist tendinitis 07/02/2020    Ulnar neuropathy at elbow of left upper extremity 01/23/2020    Numbness and tingling in left hand 01/10/2020    Hyponatremia 12/23/2019    Type 2 diabetes mellitus without complication ([address]) 17/89/9821    Cryptogenic organizing pneumonia ([address]) 09/03/2019    Shortness of breath     Restrictive lung disease     Atypical pneumonia     Acute on chronic diastolic CHF (congestive heart failure) ([facility])     Class 2 obesity with body mass index (BMI) of 39.0 to 39.9 in adult     Pneumonia of both lungs due to infectious organism 06/16/2019    Mild single current episode of major depressive disorder ([address]) 05/02/2019    Anxiety 05/02/2019    COPD with acute exacerbation ([address]) 10/10/2018    Hypothyroidism 07/31/2018    Fatigue 04/25/2016    Migraine 08/04/2014    Syncope 08/04/2014    HTN (hypertension) 04/08/2014    Hyperlipidemia with target LDL less than 130 04/08/2014    Hypokalemia 04/08/2014    Insomnia 04/08/2014    Trochanteric bursitis of both hips 04/08/2014     Acute on chronic hypoxic and hypercarbic resp failure   Secondary to COVID-19 infection in the setting of ILD and cryptogenic organizing pneumonia  Patient presented with nonrebreather  Given hypercapnia will transition to BiPAP  Now on Vapotherm and BiPAP  Pulmonary consulted  Now alternates between vapotherm and BiPAP     Acute COVID-19 infection:  COVID-19 protocol initiated  Decadron 6 mg daily day # 4   Droplet plus isolation     Acute ILD exacerbation:  Pulmonary consulted, much appreciated  Continue steroids, IBD   Levaquin day # 4   Sputum cultures       Paroxysmal Atrial fibrillation  Continue cardizem  [facility]     GERD: Continue home medication    Essential Hypertension: Reviewed patient's medications and plan is to continue home medication    Depression  Continue zoloft and seroquel    Mild thrombocytopenia   Will monitor    Hypothyroidism: Continue home medication    Diet: gen diet   Full code  DVT Prophylaxis: Wilson Barrera MD

## 2023-09-29 NOTE — PROGRESS NOTES
09/29/23 0309   NIV Type   $NIV $Daily Charge   NIV Started/Stopped On   Equipment Type v60   Mode Bilevel   Mask Type Full face mask   Mask Size Small   Assessment   Pulse 85   Respirations 20   SpO2 98 %   Settings/Measurements   IPAP 16 cmH20   CPAP/EPAP 8 cmH2O   Vt (Measured) 852 mL   Rate Ordered 14   FiO2  55 %   I Time/ I Time % 0.9 s   Minute Volume (L/min) 17 Liters   Mask Leak (lpm) 34 lpm   Patient's Home Machine No   Alarm Settings   Alarms On Y   Low Pressure (cmH2O) 5 cmH2O   High Pressure (cmH2O) 35 cmH2O   Delay Alarm 20 sec(s)   RR Low (bpm) 14   RR High (bpm) 50 br/min   Oxygen Therapy/Pulse Ox   $Oxygen $Daily Charge   $Pulse Oximeter $Spot check (multiple/continuous)

## 2023-09-29 NOTE — CARE COORDINATION
INTERDISCIPLINARY PLAN OF CARE CONFERENCE    Date/Time: 9/29/2023 5:48 PM  Completed by: Ryland Norman RN, Case Management      Patient Name:  Varsha Obando  YOB: 1955  Admitting Diagnosis: Thrombocytopenia ([address]) [D69.6]  Hypoxia [R09.02]  Acute respiratory failure with hypoxia (HCC) [J96.01]  Interstitial lung disease ([address]) [J84.9]  Dyspnea, unspecified type [R06.00]     Admit Date/Time:  9/25/2023  6:09 PM    Chart reviewed. Interdisciplinary team contacted or reviewed plan related to patient progress and discharge plans. Disciplines included Case Management, Nursing, and Dietitian. Patient currently alternating between vapotherm and Bipap. Off Precedex. RN does not anticipate discharge this weekend.

## 2023-09-29 NOTE — PROGRESS NOTES
[address] rounds completed with Dr. Koko Denise and multidisciplinary team. Reviewed labs, meds, VS, assessment, & plan of care for today. See dictated note and new orders for details. New orders received.

## 2023-09-29 NOTE — PROGRESS NOTES
Pt has not voided this shift. Pt states that she holds her urine a lot and she will go when she is ready. This RN asked to check pts brief and pt refused and stated she is not wet and will let me know when she voids.        Electronically signed by Cristiane Botello RN on 9/29/2023 at 4:24 AM Post-Care Instructions: I reviewed with the patient in detail post-care instructions. Patient is to wear sunprotection, and avoid picking at any of the treated lesions. Pt may apply Vaseline to crusted or scabbing areas.

## 2023-09-29 NOTE — PROGRESS NOTES
4 Eyes Skin Assessment     NAME:  Martha Obando  YOB: 1955  MEDICAL RECORD NUMBER:  [id number]    The patient is being assessed for  Other Shift Assessment. I agree that at least one RN has performed a thorough Head to Toe Skin Assessment on the patient. ALL assessment sites listed below have been assessed. Areas assessed by both nurses:    Head, Face, Ears, Shoulders, Back, Chest, Arms, Elbows, Hands, Sacrum. Buttock, Coccyx, Ischium, Legs. Feet and Heels, and Under Medical Devices         Does the Patient have a Wound? No noted wound(s)    Pt has scattered bruising to BUE and to R calf. Pt refuses to turn, stays on L side all the time. Will not offload heels.         Amos Prevention initiated by RN: Yes  Wound Care Orders initiated by RN: No    Pressure Injury (Stage 3,4, Unstageable, DTI, NWPT, and Complex wounds) if present, place Wound referral order by RN under : No    New Ostomies, if present place, Ostomy referral order under : No     Nurse 1 eSignature: Electronically signed by Cyrus Mejias RN on 9/29/23 at 5:59 PM EDT    **SHARE this note so that the co-signing nurse can place an eSignature**    Nurse 2 eSignature: Electronically signed by Jason Murillo RN on 9/29/23 at 6:02 PM EDT

## 2023-09-29 NOTE — PROGRESS NOTES
4 Eyes Skin Assessment     NAME:  Alvin Obando  YOB: 1955  MEDICAL RECORD NUMBER:  [id number]    The patient is being assessed for  Bedrest. Limited mobility     I agree that at least one RN has performed a thorough Head to Toe Skin Assessment on the patient. ALL assessment sites listed below have been assessed. Areas assessed by both nurses:    Head, Face, Ears, Shoulders, Back, Chest, Arms, Elbows, Hands, Sacrum. Buttock, Coccyx, Ischium, Legs. Feet and Heels, and Under Medical Devices         Does the Patient have a Wound?  No noted wound(s)       Amos Prevention initiated by RN: Yes  Wound Care Orders initiated by RN: No    Pressure Injury (Stage 3,4, Unstageable, DTI, NWPT, and Complex wounds) if present, place Wound referral order by RN under : No    New Ostomies, if present place, Ostomy referral order under : No     Nurse 1 eSignature: Electronically signed by Mustapha August RN on 9/28/23 at 10:32 PM EDT    **SHARE this note so that the co-signing nurse can place an eSignature**    Nurse 2 eSignature: Electronically signed by Florence Castillo RN on 9/29/23 at 1:25 AM EDT

## 2023-09-29 NOTE — PROGRESS NOTES
Shift assessment, completed, see flow sheet. Pt is alert and oriented x4. VSS. Pt remains on bipap at this time per request, utilizes vapotherm when off of bipap. Respirations are easy, even, and unlabored. Bilateral lung sounds are diminished throughout. PIV x2 remain in place and patent with sites and dressings C/D/I. Merissa Paco remains in place and patent. Call light within reach. Bed in lowest position. Bed alarm on. Will continue to monitor.

## 2023-09-29 NOTE — PROGRESS NOTES
Reassessment completed. No major changes. VSS. PRN 0.5 mg Ativan PO administered for anxiety. Pt Currently on Vapotherm and will call out to be placed back on Bipap when ready to sleep.      Electronically signed by Shelly Uribe RN on 9/29/2023 at 1:16 AM

## 2023-09-30 PROBLEM — R09.02 HYPOXIA: Status: ACTIVE | Noted: 2023-09-30

## 2023-09-30 LAB
ANION GAP SERPL CALCULATED.3IONS-SCNC: 8 MMOL/L (ref 3–16)
BASOPHILS # BLD: 0 K/UL (ref 0–0.2)
BASOPHILS NFR BLD: 0.1 %
BUN SERPL-MCNC: 16 MG/DL (ref 7–20)
CALCIUM SERPL-MCNC: 8.3 MG/DL (ref 8.3–10.6)
CHLORIDE SERPL-SCNC: 102 MMOL/L (ref 99–110)
CO2 SERPL-SCNC: 29 MMOL/L (ref 21–32)
CREAT SERPL-MCNC: <0.5 MG/DL (ref 0.6–1.2)
DEPRECATED RDW RBC AUTO: 14.2 % (ref 12.4–15.4)
EOSINOPHIL # BLD: 0 K/UL (ref 0–0.6)
EOSINOPHIL NFR BLD: 0.3 %
GFR SERPLBLD CREATININE-BSD FMLA CKD-EPI: >60 ML/MIN/{1.73_M2}
GLUCOSE BLD-MCNC: 119 MG/DL (ref 70–99)
GLUCOSE BLD-MCNC: 131 MG/DL (ref 70–99)
GLUCOSE BLD-MCNC: 183 MG/DL (ref 70–99)
GLUCOSE BLD-MCNC: 76 MG/DL (ref 70–99)
GLUCOSE SERPL-MCNC: 182 MG/DL (ref 70–99)
HCT VFR BLD AUTO: 33 % (ref 36–48)
HGB BLD-MCNC: 11 G/DL (ref 12–16)
LYMPHOCYTES # BLD: 0.9 K/UL (ref 1–5.1)
LYMPHOCYTES NFR BLD: 12.3 %
MCH RBC QN AUTO: 27.8 PG (ref 26–34)
MCHC RBC AUTO-ENTMCNC: 33.4 G/DL (ref 31–36)
MCV RBC AUTO: 83.3 FL (ref 80–100)
MONOCYTES # BLD: 0.5 K/UL (ref 0–1.3)
MONOCYTES NFR BLD: 6.7 %
NEUTROPHILS # BLD: 5.6 K/UL (ref 1.7–7.7)
NEUTROPHILS NFR BLD: 80.6 %
PERFORMED ON: ABNORMAL
PERFORMED ON: NORMAL
PLATELET # BLD AUTO: 112 K/UL (ref 135–450)
PMV BLD AUTO: 7.9 FL (ref 5–10.5)
POTASSIUM SERPL-SCNC: 3.7 MMOL/L (ref 3.5–5.1)
RBC # BLD AUTO: 3.96 M/UL (ref 4–5.2)
SODIUM SERPL-SCNC: 139 MMOL/L (ref 136–145)
WBC # BLD AUTO: 7 K/UL (ref 4–11)

## 2023-09-30 PROCEDURE — 99233 SBSQ HOSP IP/OBS HIGH 50: CPT | Performed by: INTERNAL MEDICINE

## 2023-09-30 PROCEDURE — 94761 N-INVAS EAR/PLS OXIMETRY MLT: CPT

## 2023-09-30 PROCEDURE — 85025 COMPLETE CBC W/AUTO DIFF WBC: CPT

## 2023-09-30 PROCEDURE — 97530 THERAPEUTIC ACTIVITIES: CPT

## 2023-09-30 PROCEDURE — 6370000000 HC RX 637 (ALT 250 FOR IP): Performed by: INTERNAL MEDICINE

## 2023-09-30 PROCEDURE — 36415 COLL VENOUS BLD VENIPUNCTURE: CPT

## 2023-09-30 PROCEDURE — 80048 BASIC METABOLIC PNL TOTAL CA: CPT

## 2023-09-30 PROCEDURE — 2000000000 HC ICU R&B

## 2023-09-30 PROCEDURE — 2700000000 HC OXYGEN THERAPY PER DAY

## 2023-09-30 PROCEDURE — 97110 THERAPEUTIC EXERCISES: CPT

## 2023-09-30 PROCEDURE — 2580000003 HC RX 258: Performed by: INTERNAL MEDICINE

## 2023-09-30 PROCEDURE — 99291 CRITICAL CARE FIRST HOUR: CPT | Performed by: INTERNAL MEDICINE

## 2023-09-30 PROCEDURE — 87205 SMEAR GRAM STAIN: CPT

## 2023-09-30 PROCEDURE — 6360000002 HC RX W HCPCS: Performed by: INTERNAL MEDICINE

## 2023-09-30 PROCEDURE — 94660 CPAP INITIATION&MGMT: CPT

## 2023-09-30 PROCEDURE — 94640 AIRWAY INHALATION TREATMENT: CPT

## 2023-09-30 PROCEDURE — 87070 CULTURE OTHR SPECIMN AEROBIC: CPT

## 2023-09-30 RX ORDER — DEXTROSE AND SODIUM CHLORIDE 5; .9 G/100ML; G/100ML
INJECTION, SOLUTION INTRAVENOUS CONTINUOUS
Status: DISCONTINUED | OUTPATIENT
Start: 2023-09-30 | End: 2023-10-01

## 2023-09-30 RX ADMIN — BENZONATATE 100 MG: 100 CAPSULE ORAL at 18:56

## 2023-09-30 RX ADMIN — LEVOTHYROXINE SODIUM 100 MCG: 100 TABLET ORAL at 09:19

## 2023-09-30 RX ADMIN — IPRATROPIUM BROMIDE AND ALBUTEROL SULFATE 1 DOSE: .5; 2.5 SOLUTION RESPIRATORY (INHALATION) at 19:31

## 2023-09-30 RX ADMIN — PIRFENIDONE 267 MG: 267 TABLET, FILM COATED ORAL at 20:21

## 2023-09-30 RX ADMIN — ENOXAPARIN SODIUM 30 MG: 100 INJECTION SUBCUTANEOUS at 20:21

## 2023-09-30 RX ADMIN — ATORVASTATIN CALCIUM 40 MG: 40 TABLET, FILM COATED ORAL at 09:19

## 2023-09-30 RX ADMIN — SENNOSIDES AND DOCUSATE SODIUM 2 TABLET: 8.6; 5 TABLET ORAL at 09:19

## 2023-09-30 RX ADMIN — PIRFENIDONE 267 MG: 267 TABLET, FILM COATED ORAL at 16:50

## 2023-09-30 RX ADMIN — LORAZEPAM 0.5 MG: 0.5 TABLET ORAL at 18:56

## 2023-09-30 RX ADMIN — SERTRALINE 100 MG: 100 TABLET, FILM COATED ORAL at 09:19

## 2023-09-30 RX ADMIN — TRAMADOL HYDROCHLORIDE 50 MG: 50 TABLET ORAL at 22:26

## 2023-09-30 RX ADMIN — PANTOPRAZOLE SODIUM 40 MG: 40 TABLET, DELAYED RELEASE ORAL at 09:19

## 2023-09-30 RX ADMIN — ACETAMINOPHEN 650 MG: 325 TABLET ORAL at 01:05

## 2023-09-30 RX ADMIN — MUPIROCIN: 20 OINTMENT TOPICAL at 20:19

## 2023-09-30 RX ADMIN — PIRFENIDONE 267 MG: 267 TABLET, FILM COATED ORAL at 09:20

## 2023-09-30 RX ADMIN — IPRATROPIUM BROMIDE AND ALBUTEROL SULFATE 1 DOSE: .5; 2.5 SOLUTION RESPIRATORY (INHALATION) at 08:08

## 2023-09-30 RX ADMIN — ENOXAPARIN SODIUM 30 MG: 100 INJECTION SUBCUTANEOUS at 09:20

## 2023-09-30 RX ADMIN — QUETIAPINE FUMARATE 12.5 MG: 25 TABLET ORAL at 20:19

## 2023-09-30 RX ADMIN — QUETIAPINE FUMARATE 12.5 MG: 25 TABLET ORAL at 09:19

## 2023-09-30 RX ADMIN — DEXAMETHASONE 6 MG: 4 TABLET ORAL at 09:20

## 2023-09-30 RX ADMIN — LEVOFLOXACIN 750 MG: 750 TABLET, FILM COATED ORAL at 09:19

## 2023-09-30 RX ADMIN — SODIUM CHLORIDE, PRESERVATIVE FREE 10 ML: 5 INJECTION INTRAVENOUS at 20:20

## 2023-09-30 RX ADMIN — MUPIROCIN: 20 OINTMENT TOPICAL at 09:19

## 2023-09-30 RX ADMIN — IPRATROPIUM BROMIDE AND ALBUTEROL SULFATE 1 DOSE: .5; 2.5 SOLUTION RESPIRATORY (INHALATION) at 13:14

## 2023-09-30 RX ADMIN — LORAZEPAM 0.5 MG: 0.5 TABLET ORAL at 12:33

## 2023-09-30 RX ADMIN — DILTIAZEM HYDROCHLORIDE 240 MG: 120 CAPSULE, COATED, EXTENDED RELEASE ORAL at 09:19

## 2023-09-30 RX ADMIN — SODIUM CHLORIDE, PRESERVATIVE FREE 10 ML: 5 INJECTION INTRAVENOUS at 09:21

## 2023-09-30 ASSESSMENT — PAIN DESCRIPTION - ORIENTATION: ORIENTATION: MID;LOWER

## 2023-09-30 ASSESSMENT — PAIN - FUNCTIONAL ASSESSMENT: PAIN_FUNCTIONAL_ASSESSMENT: PREVENTS OR INTERFERES SOME ACTIVE ACTIVITIES AND ADLS

## 2023-09-30 ASSESSMENT — PAIN DESCRIPTION - DESCRIPTORS: DESCRIPTORS: ACHING

## 2023-09-30 ASSESSMENT — PAIN SCALES - GENERAL
PAINLEVEL_OUTOF10: 5
PAINLEVEL_OUTOF10: 0
PAINLEVEL_OUTOF10: 2
PAINLEVEL_OUTOF10: 4
PAINLEVEL_OUTOF10: 0

## 2023-09-30 ASSESSMENT — PAIN DESCRIPTION - LOCATION: LOCATION: BACK

## 2023-09-30 NOTE — PROGRESS NOTES
09/29/23 2327   NIV Type   Equipment Type v60   Mode Bilevel   Mask Type Full face mask   Mask Size Small   Settings/Measurements   IPAP 16 cmH20   CPAP/EPAP 8 cmH2O   Vt (Measured) 578 mL   Rate Ordered 14   FiO2  55 %   Minute Volume (L/min) 15.4 Liters   Mask Leak (lpm) 12 lpm   Patient's Home Machine No   Patient Observation   Observations spo2 100% on 55% bipap decreased to 45%

## 2023-09-30 NOTE — PROGRESS NOTES
Shift assessment, completed, see flow sheet. Pt is alert and oriented x4. Following commands. VSS. Respirations are easy, even, and unlabored while on the bipap. Bilateral lung sounds are diminished throughout with some wheezing noted. When not on bipap pt utilizes vapotherm with assist from venturi mask. PIV x2 remain in place and patent with sites and dressings C/D/I. Sangeeta Parisian remains in place and patent. Call light within reach. Bed in lowest position. Bed alarm on. No needs expressed at this time.

## 2023-09-30 NOTE — PROGRESS NOTES
4 Eyes Skin Assessment     NAME:  Daniel Obando  YOB: 1955  MEDICAL RECORD NUMBER:  [id number]    The patient is being assessed for  Other Bedrest, limited mobility    I agree that at least one RN has performed a thorough Head to Toe Skin Assessment on the patient. ALL assessment sites listed below have been assessed. Areas assessed by both nurses:    Head, Face, Ears, Shoulders, Back, Chest, Arms, Elbows, Hands, Sacrum. Buttock, Coccyx, Ischium, Legs. Feet and Heels, and Under Medical Devices         Does the Patient have a Wound?  No noted wound(s)       Amos Prevention initiated by RN: Yes  Wound Care Orders initiated by RN: No    Pressure Injury (Stage 3,4, Unstageable, DTI, NWPT, and Complex wounds) if present, place Wound referral order by RN under : No    New Ostomies, if present place, Ostomy referral order under : No     Nurse 1 eSignature: Electronically signed by Amara Murillo RN on 9/30/23 at 6:35 AM EDT    **SHARE this note so that the co-signing nurse can place an eSignature**    Nurse 2 eSignature: Electronically signed by Gasper Cool RN on 9/30/23 at 6:44 AM EDT

## 2023-09-30 NOTE — PROGRESS NOTES
Pt's spouse arrived to visit her. Updated spouse on the changes made today including the decision by the pt to change her code status. Pt's spouse voiced understanding at this time. No further questions noted.

## 2023-09-30 NOTE — PROGRESS NOTES
Pt noted to be yelling while in her room with the door shut d/t COVID Contact Plus Isolation, \"help, help, help. \" Upon RN entering room, pt states, \"I can't breathe,\" and is gasping for air. Pt was on vapotherm (30L, 55%) with assistance from Venturi mask as well. RN placed bipap on pt at that time and notified RT. Pt dropped as low as 58% however recovered quickly once bipap was placed. When asked, pt stated she wasent doing anything differently to provoke a drop in 02 %.

## 2023-09-30 NOTE — PROGRESS NOTES
Inpatient Physical Therapy Evaluation & Treatment    Unit: ICU  Date:  9/30/2023  Patient Name:    Cleo Obando  Admitting diagnosis: Thrombocytopenia ([address]) [D69.6]  Hypoxia [R09.02]  Acute respiratory failure with hypoxia (HCC) [J96.01]  Interstitial lung disease ([address]) [J84.9]  Dyspnea, unspecified type [R06.00]  Admit Date:  9/25/2023  Precautions/Restrictions/WB Status/ Lines/ Wounds/ Oxygen: Fall risk, Bed/chair alarm, Lines (IV, Supplemental O2 ( Venti mask  and 30 L FiO2 @ 55%L), and external catheter), Telemetry, and Continuous pulse oximetry          Cleared for bed level only this date. Would require cotx for any further activity    Treatment Time:  1326-13:50  Treatment Number:  1   Timed Code Treatment Minutes: 224 minutes  Total Treatment Minutes:    24minutes    Patient Stated Goals for Therapy: none stated ,agreeable to bed ex      Discharge Recommendations: SNF  DME needs for discharge: Defer to facility       Therapy recommendation for EMS Transport: requires transport by cot due to pt with poor sitting balance/tolerance    Therapy recommendations for staff:   Assist of 1 for sitting EOB at discretion of RN    History of Present Illness:   Per Dr Bijal Burks H&P    77 y.o. female who presented to [facility] with past medical history of atrial fibrillation, COPD, HFpEF, GERD, type 2 diabetes, hypertension, hyperlipidemia, chronic hypoxic respiratory failure on 3 L presented to ED with chief complaint of shortness of breath     Patient was accepted from [facility] to be transferred to [facility]     Patient reported that shortness of breath started today progressively worsening thus called the ambulance no known alleviating factor, reported exacerbated with exertion no associate with fever chills abdominal pain chest pain or dysuria. Patient reports that her daughter visited her and does not know if she got the COVID from that.     Home Health S4 Level Recommendation:  NA

## 2023-09-30 NOTE — PLAN OF CARE
Problem: Discharge Planning  Goal: Discharge to home or other facility with appropriate resources  Outcome: Progressing  Flowsheets (Taken 9/29/2023 1943)  Discharge to home or other facility with appropriate resources: Identify barriers to discharge with patient and caregiver     Problem: Skin/Tissue Integrity  Goal: Absence of new skin breakdown  Description: 1. Monitor for areas of redness and/or skin breakdown  2. Assess vascular access sites hourly  3. Every 4-6 hours minimum:  Change oxygen saturation probe site  4. Every 4-6 hours:  If on nasal continuous positive airway pressure, respiratory therapy assess nares and determine need for appliance change or resting period.   Outcome: Progressing     Problem: Pain  Goal: Verbalizes/displays adequate comfort level or baseline comfort level  Outcome: Progressing  Flowsheets (Taken 9/29/2023 1932)  Verbalizes/displays adequate comfort level or baseline comfort level: Encourage patient to monitor pain and request assistance     Problem: Safety - Adult  Goal: Free from fall injury  Outcome: Progressing     Problem: ABCDS Injury Assessment  Goal: Absence of physical injury  Outcome: Progressing     Problem: Chronic Conditions and Co-morbidities  Goal: Patient's chronic conditions and co-morbidity symptoms are monitored and maintained or improved  Outcome: Progressing  Flowsheets (Taken 9/29/2023 1943)  Care Plan - Patient's Chronic Conditions and Co-Morbidity Symptoms are Monitored and Maintained or Improved:   Monitor and assess patient's chronic conditions and comorbid symptoms for stability, deterioration, or improvement   Collaborate with multidisciplinary team to address chronic and comorbid conditions and prevent exacerbation or deterioration   Update acute care plan with appropriate goals if chronic or comorbid symptoms are exacerbated and prevent overall improvement and discharge     Problem: Cardiovascular - Adult  Goal: Maintains optimal cardiac output

## 2023-09-30 NOTE — PROGRESS NOTES
Care rounds completed with Dr. Tavia Ibarra and multidisciplinary team. Reviewed labs, meds, VS, assessment, & plan of care for today. See dictated note and new orders for details. New orders received. Pt is now a limited code, meds only.

## 2023-09-30 NOTE — PROGRESS NOTES
4 Eyes Skin Assessment     NAME:  Lili Obando  YOB: 1955  MEDICAL RECORD NUMBER:  [id number]    The patient is being assessed for  Other Shift Assessment. I agree that at least one RN has performed a thorough Head to Toe Skin Assessment on the patient. ALL assessment sites listed below have been assessed. Areas assessed by both nurses:    Head, Face, Ears, Shoulders, Back, Chest, Arms, Elbows, Hands, Sacrum. Buttock, Coccyx, Ischium, and Legs. Feet and Heels        Does the Patient have a Wound? No noted wound(s)       Pt has scattered bruising to BUE and to R calf. Pt refuses to turn, stays on L side all the time. Will not offload heels.     Amos Prevention initiated by RN: Yes  Wound Care Orders initiated by RN: Yes    Pressure Injury (Stage 3,4, Unstageable, DTI, NWPT, and Complex wounds) if present, place Wound referral order by RN under : No    New Ostomies, if present place, Ostomy referral order under : No     Nurse 1 eSignature: Electronically signed by Montse Toledo RN on 9/30/23 at 2:50 PM EDT    **SHARE this note so that the co-signing nurse can place an eSignature**    Nurse 2 eSignature: Electronically signed by Mariella Cooks, RN on 9/30/23 at 2:52 PM EDT

## 2023-09-30 NOTE — PROGRESS NOTES
Pt a/o x4, VSS. Pt on and off Bipap and Vapotherm and uses Venturi mask for additional support when on Vapotherm. Shift assessment completed, see flowsheets. CHG bath completed, and pt allowed repositioning and linens to be changed. Purewick replaced. Bed in lowest position, brakes locked and alarm on. Bedside table and call light within reach. No further needs expressed at this time.      Electronically signed by Cameron Foote RN on 9/29/2023 at 10:14 PM

## 2023-10-01 PROBLEM — J84.9 ILD (INTERSTITIAL LUNG DISEASE) (HCC): Status: ACTIVE | Noted: 2023-10-01

## 2023-10-01 LAB
ANION GAP SERPL CALCULATED.3IONS-SCNC: 7 MMOL/L (ref 3–16)
BASOPHILS # BLD: 0 K/UL (ref 0–0.2)
BASOPHILS NFR BLD: 0.1 %
BUN SERPL-MCNC: 9 MG/DL (ref 7–20)
CALCIUM SERPL-MCNC: 8.3 MG/DL (ref 8.3–10.6)
CHLORIDE SERPL-SCNC: 98 MMOL/L (ref 99–110)
CO2 SERPL-SCNC: 30 MMOL/L (ref 21–32)
CREAT SERPL-MCNC: <0.5 MG/DL (ref 0.6–1.2)
DEPRECATED RDW RBC AUTO: 14.4 % (ref 12.4–15.4)
EOSINOPHIL # BLD: 0.1 K/UL (ref 0–0.6)
EOSINOPHIL NFR BLD: 0.8 %
GFR SERPLBLD CREATININE-BSD FMLA CKD-EPI: >60 ML/MIN/{1.73_M2}
GLUCOSE BLD-MCNC: 148 MG/DL (ref 70–99)
GLUCOSE BLD-MCNC: 224 MG/DL (ref 70–99)
GLUCOSE SERPL-MCNC: 104 MG/DL (ref 70–99)
HCT VFR BLD AUTO: 32.9 % (ref 36–48)
HGB BLD-MCNC: 11.1 G/DL (ref 12–16)
LYMPHOCYTES # BLD: 1 K/UL (ref 1–5.1)
LYMPHOCYTES NFR BLD: 13.9 %
MCH RBC QN AUTO: 27.8 PG (ref 26–34)
MCHC RBC AUTO-ENTMCNC: 33.7 G/DL (ref 31–36)
MCV RBC AUTO: 82.3 FL (ref 80–100)
MONOCYTES # BLD: 0.6 K/UL (ref 0–1.3)
MONOCYTES NFR BLD: 7.6 %
NEUTROPHILS # BLD: 5.8 K/UL (ref 1.7–7.7)
NEUTROPHILS NFR BLD: 77.6 %
PERFORMED ON: ABNORMAL
PERFORMED ON: ABNORMAL
PLATELET # BLD AUTO: 111 K/UL (ref 135–450)
PMV BLD AUTO: 8.3 FL (ref 5–10.5)
POTASSIUM SERPL-SCNC: 3.6 MMOL/L (ref 3.5–5.1)
RBC # BLD AUTO: 4 M/UL (ref 4–5.2)
SODIUM SERPL-SCNC: 135 MMOL/L (ref 136–145)
WBC # BLD AUTO: 7.5 K/UL (ref 4–11)

## 2023-10-01 PROCEDURE — 2000000000 HC ICU R&B

## 2023-10-01 PROCEDURE — 2580000003 HC RX 258: Performed by: INTERNAL MEDICINE

## 2023-10-01 PROCEDURE — 94660 CPAP INITIATION&MGMT: CPT

## 2023-10-01 PROCEDURE — 97530 THERAPEUTIC ACTIVITIES: CPT

## 2023-10-01 PROCEDURE — 6360000002 HC RX W HCPCS: Performed by: INTERNAL MEDICINE

## 2023-10-01 PROCEDURE — 6370000000 HC RX 637 (ALT 250 FOR IP): Performed by: INTERNAL MEDICINE

## 2023-10-01 PROCEDURE — 94640 AIRWAY INHALATION TREATMENT: CPT

## 2023-10-01 PROCEDURE — 36415 COLL VENOUS BLD VENIPUNCTURE: CPT

## 2023-10-01 PROCEDURE — 80048 BASIC METABOLIC PNL TOTAL CA: CPT

## 2023-10-01 PROCEDURE — 85025 COMPLETE CBC W/AUTO DIFF WBC: CPT

## 2023-10-01 PROCEDURE — 2700000000 HC OXYGEN THERAPY PER DAY

## 2023-10-01 PROCEDURE — 99291 CRITICAL CARE FIRST HOUR: CPT | Performed by: INTERNAL MEDICINE

## 2023-10-01 PROCEDURE — 94761 N-INVAS EAR/PLS OXIMETRY MLT: CPT

## 2023-10-01 PROCEDURE — 99233 SBSQ HOSP IP/OBS HIGH 50: CPT | Performed by: INTERNAL MEDICINE

## 2023-10-01 RX ADMIN — PANTOPRAZOLE SODIUM 40 MG: 40 TABLET, DELAYED RELEASE ORAL at 05:10

## 2023-10-01 RX ADMIN — PIRFENIDONE 267 MG: 267 TABLET, FILM COATED ORAL at 15:09

## 2023-10-01 RX ADMIN — PIRFENIDONE 267 MG: 267 TABLET, FILM COATED ORAL at 07:54

## 2023-10-01 RX ADMIN — PIRFENIDONE 267 MG: 267 TABLET, FILM COATED ORAL at 20:35

## 2023-10-01 RX ADMIN — IPRATROPIUM BROMIDE AND ALBUTEROL SULFATE 1 DOSE: .5; 2.5 SOLUTION RESPIRATORY (INHALATION) at 19:56

## 2023-10-01 RX ADMIN — ATORVASTATIN CALCIUM 40 MG: 40 TABLET, FILM COATED ORAL at 07:54

## 2023-10-01 RX ADMIN — DILTIAZEM HYDROCHLORIDE 240 MG: 120 CAPSULE, COATED, EXTENDED RELEASE ORAL at 07:54

## 2023-10-01 RX ADMIN — SODIUM CHLORIDE, PRESERVATIVE FREE 10 ML: 5 INJECTION INTRAVENOUS at 20:34

## 2023-10-01 RX ADMIN — SERTRALINE 100 MG: 100 TABLET, FILM COATED ORAL at 07:54

## 2023-10-01 RX ADMIN — BENZONATATE 100 MG: 100 CAPSULE ORAL at 20:34

## 2023-10-01 RX ADMIN — SENNOSIDES AND DOCUSATE SODIUM 2 TABLET: 8.6; 5 TABLET ORAL at 20:47

## 2023-10-01 RX ADMIN — IPRATROPIUM BROMIDE AND ALBUTEROL SULFATE 1 DOSE: .5; 2.5 SOLUTION RESPIRATORY (INHALATION) at 11:24

## 2023-10-01 RX ADMIN — LORAZEPAM 0.5 MG: 0.5 TABLET ORAL at 01:25

## 2023-10-01 RX ADMIN — QUETIAPINE FUMARATE 12.5 MG: 25 TABLET ORAL at 07:54

## 2023-10-01 RX ADMIN — ACETAMINOPHEN 650 MG: 325 TABLET ORAL at 20:33

## 2023-10-01 RX ADMIN — LORAZEPAM 0.5 MG: 0.5 TABLET ORAL at 20:33

## 2023-10-01 RX ADMIN — QUETIAPINE FUMARATE 12.5 MG: 25 TABLET ORAL at 20:34

## 2023-10-01 RX ADMIN — ENOXAPARIN SODIUM 30 MG: 100 INJECTION SUBCUTANEOUS at 20:34

## 2023-10-01 RX ADMIN — DEXAMETHASONE 6 MG: 4 TABLET ORAL at 07:54

## 2023-10-01 RX ADMIN — IPRATROPIUM BROMIDE AND ALBUTEROL SULFATE 1 DOSE: .5; 2.5 SOLUTION RESPIRATORY (INHALATION) at 07:44

## 2023-10-01 RX ADMIN — ACETAMINOPHEN 650 MG: 325 TABLET ORAL at 01:25

## 2023-10-01 RX ADMIN — LORAZEPAM 0.5 MG: 0.5 TABLET ORAL at 11:57

## 2023-10-01 RX ADMIN — BENZONATATE 100 MG: 100 CAPSULE ORAL at 01:26

## 2023-10-01 RX ADMIN — ENOXAPARIN SODIUM 30 MG: 100 INJECTION SUBCUTANEOUS at 07:54

## 2023-10-01 RX ADMIN — LEVOTHYROXINE SODIUM 100 MCG: 100 TABLET ORAL at 05:10

## 2023-10-01 RX ADMIN — LEVOFLOXACIN 750 MG: 750 TABLET, FILM COATED ORAL at 07:54

## 2023-10-01 ASSESSMENT — PAIN SCALES - GENERAL
PAINLEVEL_OUTOF10: 2
PAINLEVEL_OUTOF10: 3
PAINLEVEL_OUTOF10: 2
PAINLEVEL_OUTOF10: 3
PAINLEVEL_OUTOF10: 5
PAINLEVEL_OUTOF10: 1

## 2023-10-01 ASSESSMENT — PAIN DESCRIPTION - LOCATION
LOCATION: BACK
LOCATION: HEAD

## 2023-10-01 ASSESSMENT — PAIN DESCRIPTION - DESCRIPTORS
DESCRIPTORS: ACHING
DESCRIPTORS: ACHING

## 2023-10-01 ASSESSMENT — PAIN DESCRIPTION - ORIENTATION
ORIENTATION: MID;LOWER
ORIENTATION: MID

## 2023-10-01 NOTE — PROGRESS NOTES
4 Eyes Skin Assessment     NAME:  Mati Obando  YOB: 1955  MEDICAL RECORD NUMBER:  [id number]    The patient is being assessed for  Shift Handoff    I agree that at least one RN has performed a thorough Head to Toe Skin Assessment on the patient. ALL assessment sites listed below have been assessed. Areas assessed by both nurses:    Head, Face, Ears, Shoulders, Back, Chest, Arms, Elbows, Hands, Sacrum. Buttock, Coccyx, Ischium, Legs. Feet and Heels, and Under Medical Devices     Redness and Excoriation on lynnette area and left pannus. Barrier cream applied. Does the Patient have a Wound? Yes wound(s) were present on assessment. LDA wound assessment was Initiated and completed by RN       Amos Prevention initiated by RN: Yes  Wound Care Orders initiated by RN: Yes    Pressure Injury (Stage 3,4, Unstageable, DTI, NWPT, and Complex wounds) if present, place Wound referral order by RN under : No    New Ostomies, if present place, Ostomy referral order under : No     Nurse 1 eSignature: Electronically signed by Sheree Tony RN on 10/1/23 at 3:47 AM EDT    **SHARE this note so that the co-signing nurse can place an eSignature**    Nurse 2 eSignature: Electronically signed by Gail Dawson RN on 10/1/23 at 5:05 AM EDT   Patient is not able to demonstrated the ability to move from a reclining position to an upright position within the recliner.  however patient is alert, oriented and able to provide informed consent

## 2023-10-01 NOTE — PROGRESS NOTES
10/01/23 0317   NIV Type   Equipment Type v60   Mode Bilevel   Mask Type Full face mask   Mask Size Small   Settings/Measurements   IPAP 16 cmH20   CPAP/EPAP 8 cmH2O   Vt (Measured) 617 mL   Rate Ordered 14   FiO2  45 %   Minute Volume (L/min) 13.1 Liters   Mask Leak (lpm) 0 lpm   Patient Observation   Observations spo2 100% on 45% bipap

## 2023-10-01 NOTE — PROGRESS NOTES
Care rounds completed with Dr. Noelle Norton and multidisciplinary team. Reviewed labs, meds, VS, assessment, & plan of care for today. See dictated note and new orders for details. NNO orders received.

## 2023-10-01 NOTE — PLAN OF CARE
Problem: Discharge Planning  Goal: Discharge to home or other facility with appropriate resources  Outcome: Progressing  Flowsheets (Taken 9/30/2023 2240)  Discharge to home or other facility with appropriate resources:   Identify barriers to discharge with patient and caregiver   Arrange for needed discharge resources and transportation as appropriate     Problem: Skin/Tissue Integrity  Goal: Absence of new skin breakdown  Description: 1. Monitor for areas of redness and/or skin breakdown  2. Assess vascular access sites hourly  3. Every 4-6 hours minimum:  Change oxygen saturation probe site  4. Every 4-6 hours:  If on nasal continuous positive airway pressure, respiratory therapy assess nares and determine need for appliance change or resting period.   Outcome: Progressing     Problem: Respiratory - Adult  Goal: Achieves optimal ventilation and oxygenation  Outcome: Progressing  Flowsheets (Taken 9/30/2023 2240)  Achieves optimal ventilation and oxygenation:   Assess for changes in respiratory status   Assess for changes in mentation and behavior   Oxygen supplementation based on oxygen saturation or arterial blood gases   Position to facilitate oxygenation and minimize respiratory effort     Problem: Pain  Goal: Verbalizes/displays adequate comfort level or baseline comfort level  Outcome: Progressing  Flowsheets (Taken 9/30/2023 2240)  Verbalizes/displays adequate comfort level or baseline comfort level:   Encourage patient to monitor pain and request assistance   Assess pain using appropriate pain scale   Administer analgesics based on type and severity of pain and evaluate response     Problem: Safety - Adult  Goal: Free from fall injury  Outcome: Progressing  Flowsheets (Taken 9/30/2023 2240)  Free From Fall Injury: Instruct family/caregiver on patient safety     Problem: ABCDS Injury Assessment  Goal: Absence of physical injury  Outcome: Progressing  Flowsheets (Taken 9/30/2023 2240)  Absence of

## 2023-10-01 NOTE — PROGRESS NOTES
09/30/23 0156   NIV Type   Equipment Type v60   Mode Bilevel   Mask Type Full face mask   Mask Size Small   Settings/Measurements   IPAP 16 cmH20   CPAP/EPAP 8 cmH2O   Vt (Measured) 522 mL   Rate Ordered 14   FiO2  45 %   Minute Volume (L/min) 14.9 Liters   Mask Leak (lpm) 0 lpm   Patient's Home Machine No   Patient Observation   Observations spo2 93% on 45% bipap

## 2023-10-01 NOTE — PROGRESS NOTES
Pt A/O. On Vapo therm 30 Lt and FiO2 55%, Will be on BiPAP at night 16/8 RR 14 FiO2 45%. Venturi mask over the face PRN. 2 PIV's in place. ST on the monitor. Pure wick in place. Vs S. Assessment complete as charted. Repositioned for comfort. Call light in reach. Denies needs. Will continue to monitor.

## 2023-10-01 NOTE — PROGRESS NOTES
Shift assessment, completed, see flow sheet. Pt is alert and oriented x4. Following commands. VSS. Respirations are easy, even, and unlabored while on the bipap. Bilateral lung sounds are diminished throughout with some wheezing noted. When not on bipap pt utilizes vapotherm with assist from venturi mask. PIV x2 remain in place and patent with sites and dressings C/D/I. Evorn Clan remains in place and patent. Call light within reach. Bed in lowest position. Bed alarm on. No needs expressed at this time.

## 2023-10-01 NOTE — PROGRESS NOTES
Sensation    NT    Coordination  NT    Tone  Not Tested    Balance  Static Sitting:  Good ; SBA  Dynamic Sitting:  Good - ; CGA   Comments: felt subjectively SOB    Static Standing: Fair +; CGA  Dynamic Standing: Good ; CGA  Comments:     Posture  Seated: Forward head and neck and Thoracic kyphosis  Standing: Not Tested, Forward head and neck, and Thoracic kyphosis    Bed Mobility   Supine to Sit:    Min A x2 people  Sit to Supine:   Not Tested   Rolling:   Not Tested   Scooting in sitting: CGA   Scooting in supine:  Not Tested   Bridging:  Not Tested    Transfer Training     Sit to stand:   CGA   Stand to sit:   CGA   Bed to Chair:   CGA with use of gait belt    Gait gait deferred due to fatigue; pt ambulated 0 ft. Distance:       ft  Deviations (firm surface/linoleum):  N/A  Assistive Device Used:    N/A  Level of Assist:    Not Tested   Comment:     Stair Training deferred, pt does not have stairs in home environment    Therapeutic Exercises Initiated  deferred secondary to pt fatigued  Supine:  N/A    Seated:  N/A    Standing:  N/A    Activity Tolerance   During therapy session noted pt with desaturation of O2  see vitals chart below for details    Pt Position BP (mmHg) HR (bpm) SpO2 (%) on 30 L FiO2 @ 55%  Comments   Supine at rest  115 95    Seated at EOB  120-130 90's,but subjectively SOB RN increased Fio2 to 100% for transfer   Standing       End of session  110 93      Positioning Needs   Pt up in chair, ICU monitoring, positioned in proper neutral alignment and pressure relief provided. Call light provided and all needs within reach  RN aware of pt position/status    Rony lift pad in place     Other Activities  None. Patient/Family Education   Pt educated on role of inpatient PT, POC, importance of continued activity, transfer techniques and calling for assist with mobility. CHF Education  N/A    Assessment  Pt seen today for physical therapy Evaluation & Treatment.  Pt demonstrated decreased Activity tolerance, Balance, ROM, Safety, and Strength as well as decreased independence with Ambulation, Bed Mobility , and Transfers. Patient tolerated transfers bed to chair with preoxygentation. Recommending SNF upon discharge as patient functioning well below baseline, demonstrates good rehab potential and unable to return home due to inability to negotiate stairs to enter home/bedroom/bathroom, burden of care beyond caregiver ability, home environment not conducive to patient recovery, and limited safety awareness. Goals : To be met in 3 visits:  1). Independent with LE Ex x 10 reps  2). Sit to/from stand:  ability to assess  3). Bed to chair:  ability to assess      To be met in 6 visits:  1). Supine to/from sit: SBA  2). Sit to/from stand: Min A   3). Bed to chair: Min A   4). Gait: Ambulate  15 ft.   with Min A  and use of LRAD (least restrictive assistive device)  5). Tolerate B LE exercises 3 sets of 10-15 reps      Rehabilitation Potential: Fair  Strengths for achieving goals include:   Pt motivated, PLOF, Family Support, and Pt cooperative   Barriers to achieving goals include:    Complexity of condition, Chronicity of condition, and Weakness        Plan    To be seen 3-5 x / week  while in acute care setting for therapeutic exercises, bed mobility, transfers, progressive gait training, balance training, and family/patient education. Signature: Joelle Bermudez PT     If patient discharges from this facility prior to next visit, this note will serve as the Discharge Summary.

## 2023-10-01 NOTE — PROGRESS NOTES
Inpatient Occupational Therapy Treatment    Unit: ICU  Date:  10/1/2023  Patient Name:    Crystal Obando  Admitting diagnosis: Thrombocytopenia ([address]) [D69.6]  Hypoxia [R09.02]  Acute respiratory failure with hypoxia (HCC) [J96.01]  Interstitial lung disease ([address]) [J84.9]  Dyspnea, unspecified type [R06.00]  Admit Date:  9/25/2023  Precautions/Restrictions/WB Status/ Lines/ Wounds/ Oxygen: Fall risk, Bed/chair alarm, Lines (IV, Supplemental O2 ( Venti mask  and 30 L FiO2 @ 55%L), and external catheter), Telemetry, and Continuous pulse oximetry     RN increased to 100% fio2  at EOB,prior to transfer     Pt seen for cotreatment this date due to patient safety, patient endurance, complexity of condition, acute illness/injury, limited functional status information, and need for the assistance of 2 skilled therapists    Treatment Time:  6138-1963  Treatment Number:  2  Timed Code Treatment Minutes:  30 minutes  Total Treatment Minutes:  30 minutes    Patient Goals for Therapy: \"to get up into chair\"          Discharge Recommendations: SNF  DME needs for discharge: Defer to facility       Therapy recommendations for staff:   Assist of 1 for sitting EOB    History of Present Illness: Per Dr Tere Connors H&P  77 y.o. female who presented to [facility] with past medical history of atrial fibrillation, COPD, HFpEF, GERD, type 2 diabetes, hypertension, hyperlipidemia, chronic hypoxic respiratory failure on 3 L presented to ED with chief complaint of shortness of breath     Patient was accepted from [place] to be transferred to [facility]     Patient reported that shortness of breath started today progressively worsening thus called the ambulance no known alleviating factor, reported exacerbated with exertion no associate with fever chills abdominal pain chest pain or dysuria. Patient reports that her daughter visited her and does not know if she got the COVID from that.     Home Health S4 Level Recommendation: NA    AM-PAC Score: AM-PAC Inpatient Daily Activity Raw Score: 9     Subjective:  Patient lying reclined in bed. Pt agreeable to this OT/PT session. Cognition:    A&O x4   Able to follow 2 step commands    Pain:   No  Location: N/A  Rating: NA /10  Pain Medicine Status: No request made    Preadmission Environment:   Pt. Lives     Pt. Lives with spouse & dog. Spouse works 4pm-midnight 5 days/week. Daughter comes over 3+ days/week while  is gone. And 2 sons stay with her for 3-4 hours on the other evenings. She helps with bathing, meal prep, and laundry  Home environment:    one story home  Steps to enter first floor:   Ramp  Steps to second floor/basement: N/A  Bathroom:     walk in shower, grab bars in shower, shower chair , and comfort height toilet  Equipment owned:    rollator, manual WC, BSC, shower chair/bench, home O2 (4L, increasing to 10L with movementL) continous, nebulizer, and pulse ox    Preadmission Status:  Pt. Able to drive:    No  Pt. Fully independent with ADLs:  Yes (until recently)  Pt. Required assistance for:   Cleaning, Cooking, and Laundry   Pt. independent for functional transfers and utilized Manual W/C for mobility in home. Completes stand pivot transfers w/c to toilet. History of falls:    Yes  Home Health Services:  None    Objective:  Does this pt have an acute or acute on chronic diagnosis of CHF?  No    Upper Extremity ROM:    Appears WFLs    Dominant Hand: Unknown    Upper Extremity Strength:    BUE strength impaired but not formally assessed w/ MMT    Upper Extremity Sensation:    NT    Upper Extremity Proprioception:  NT    Coordination:  NT    Tone:  NT    Balance:  Sitting:    SBA/CGA  Standing:    CGA    Bed Mobility:   Supine to Sit:    Min A  and 2 person  Sit to Supine:   Not Tested  Rolling:   Not Tested  Scooting in sitting: CGA  Scooting in supine:  Not Tested     Transfers:    Sit to stand:    CGA  Stand to sit:    CGA  Bed to chair:     CGA  Bed/ chair to

## 2023-10-01 NOTE — PLAN OF CARE
Problem: Cardiovascular - Adult  Goal: Maintains optimal cardiac output and hemodynamic stability  10/1/2023 0341 by Cy Aschoff, RN  Outcome: Progressing  Flowsheets (Taken 10/1/2023 0341)  Maintains optimal cardiac output and hemodynamic stability:   Monitor blood pressure and heart rate   Monitor urine output and notify Licensed Independent Practitioner for values outside of normal range  9/30/2023 2240 by Cy Aschoff, RN  Outcome: Progressing  Flowsheets (Taken 9/30/2023 2240)  Maintains optimal cardiac output and hemodynamic stability: Monitor blood pressure and heart rate   HEART FAILURE CARE PLAN:    Comorbidities Reviewed: Yes   Patient has a past medical history of A-fib ([address]), Acute cystitis without hematuria, Anxiety, CHF (congestive heart failure) ([address]), COPD (chronic obstructive pulmonary disease) ([address]), Cryptogenic organizing pneumonia ([address]), Depression, Diabetes mellitus ([address]), GERD (gastroesophageal reflux disease), Hyperlipidemia, Hypertension, ILD (interstitial lung disease) ([address]), On home oxygen therapy, Rash, and Thyroid disease. ECHOCARDIOGRAM Reviewed: Yes   Patient's Ejection Fraction (EF) is greater than 40%    Weights Reviewed:  Yes   Admission weight: 124 lb (56.2 kg)   Wt Readings from Last 3 Encounters:   09/30/23 114 lb 4.8 oz (51.8 kg)   09/24/23 124 lb (56.2 kg)   08/23/23 123 lb 6.4 oz (56 kg)     Intake & Output Reviewed: Yes     Intake/Output Summary (Last 24 hours) at 10/1/2023 0341  Last data filed at 10/1/2023 0000  Gross per 24 hour   Intake 280 ml   Output 1100 ml   Net -820 ml     Medications Reviewed: Yes   SCHEDULED HOSPITAL MEDICATIONS:   Pirfenidone  267 mg Oral TID    ipratropium 0.5 mg-albuterol 2.5 mg  1 Dose Inhalation TID RT    levoFLOXacin  750 mg Oral Daily    insulin lispro  0-16 Units SubCUTAneous TID WC    insulin lispro  0-4 Units SubCUTAneous Nightly    dexamethasone  6 mg Oral Daily    sennosides-docusate sodium  2 tablet Oral BID    potassium chloride  30 mEq Oral Once    QUEtiapine  12.5 mg Oral BID    sodium chloride flush  10 mL IntraVENous 2 times per day    enoxaparin  30 mg SubCUTAneous BID    atorvastatin  40 mg Oral Daily    dilTIAZem  240 mg Oral Daily    levothyroxine  100 mcg Oral Daily    pantoprazole  40 mg Oral QAM AC    sertraline  100 mg Oral Daily     ACE/ARB/ARNI is REQUIRED for EF </= 52% SYSTOLIC FAILURE:   ACE[de-identified] None  ARB[de-identified] None  ARNI[de-identified] None    Evidenced-Based Beta Blocker is REQUIRED for EF </= 74% SYSTOLIC FAILURE:   [de-identified] None    Diuretics:  [de-identified] None    Diet Reviewed: Yes   ADULT DIET; Regular; 4 carb choices (60 gm/meal)  ADULT ORAL NUTRITION SUPPLEMENT; Breakfast, Lunch, Dinner; Low Calorie/High Protein Oral Supplement    Goal of Care Reviewed: Yes   Patient and/or Family's stated Goal of Care this Admission: reduce shortness of breath, increase activity tolerance, better understand heart failure and disease management, be more comfortable, and reduce lower extremity edema prior to discharge.

## 2023-10-01 NOTE — PROGRESS NOTES
4 Eyes Skin Assessment     NAME:  Cherie Obando  YOB: 1955  MEDICAL RECORD NUMBER:  [id number]    The patient is being assessed for  Other Shift Assessment. I agree that at least one RN has performed a thorough Head to Toe Skin Assessment on the patient. ALL assessment sites listed below have been assessed. Areas assessed by both nurses:    Head, Face, Ears, Shoulders, Back, Chest, Arms, Elbows, Hands, Sacrum. Buttock, Coccyx, Ischium, and Legs. Feet and Heels        Does the Patient have a Wound? No noted wound(s)    Pt has scattered bruising to BUE and to R calf. Pt refuses to turn, stays on L side all the time. Will not offload heels. Pt noted to have reddened nati to L hip from purewick tubing, padded dressing applied to area to prevent worsening.        Amos Prevention initiated by RN: Yes  Wound Care Orders initiated by RN: No    Pressure Injury (Stage 3,4, Unstageable, DTI, NWPT, and Complex wounds) if present, place Wound referral order by RN under : No    New Ostomies, if present place, Ostomy referral order under : No     Nurse 1 eSignature: Electronically signed by Daquan Banks RN on 10/1/23 at 4:06 PM EDT    **SHARE this note so that the co-signing nurse can place an eSignature**    Nurse 2 eSignature: Electronically signed by Naga Watkins RN on 10/1/23 at 4:11 PM EDT

## 2023-10-02 ENCOUNTER — APPOINTMENT (OUTPATIENT)
Dept: GENERAL RADIOLOGY | Age: 68
DRG: 177 | End: 2023-10-02
Payer: MEDICARE

## 2023-10-02 LAB
ANION GAP SERPL CALCULATED.3IONS-SCNC: 8 MMOL/L (ref 3–16)
BASOPHILS # BLD: 0 K/UL (ref 0–0.2)
BASOPHILS NFR BLD: 0.4 %
BUN SERPL-MCNC: 14 MG/DL (ref 7–20)
CALCIUM SERPL-MCNC: 8.4 MG/DL (ref 8.3–10.6)
CHLORIDE SERPL-SCNC: 101 MMOL/L (ref 99–110)
CO2 SERPL-SCNC: 29 MMOL/L (ref 21–32)
CREAT SERPL-MCNC: <0.5 MG/DL (ref 0.6–1.2)
DEPRECATED RDW RBC AUTO: 14.1 % (ref 12.4–15.4)
EOSINOPHIL # BLD: 0.1 K/UL (ref 0–0.6)
EOSINOPHIL NFR BLD: 0.9 %
GFR SERPLBLD CREATININE-BSD FMLA CKD-EPI: >60 ML/MIN/{1.73_M2}
GLUCOSE BLD-MCNC: 164 MG/DL (ref 70–99)
GLUCOSE BLD-MCNC: 164 MG/DL (ref 70–99)
GLUCOSE BLD-MCNC: 231 MG/DL (ref 70–99)
GLUCOSE BLD-MCNC: 284 MG/DL (ref 70–99)
GLUCOSE SERPL-MCNC: 134 MG/DL (ref 70–99)
HCT VFR BLD AUTO: 31.7 % (ref 36–48)
HGB BLD-MCNC: 10.7 G/DL (ref 12–16)
LYMPHOCYTES # BLD: 0.9 K/UL (ref 1–5.1)
LYMPHOCYTES NFR BLD: 12.3 %
MCH RBC QN AUTO: 27.8 PG (ref 26–34)
MCHC RBC AUTO-ENTMCNC: 33.8 G/DL (ref 31–36)
MCV RBC AUTO: 82.3 FL (ref 80–100)
MONOCYTES # BLD: 0.5 K/UL (ref 0–1.3)
MONOCYTES NFR BLD: 6.9 %
NEUTROPHILS # BLD: 6.2 K/UL (ref 1.7–7.7)
NEUTROPHILS NFR BLD: 79.5 %
PERFORMED ON: ABNORMAL
PLATELET # BLD AUTO: 113 K/UL (ref 135–450)
PMV BLD AUTO: 7.8 FL (ref 5–10.5)
POTASSIUM SERPL-SCNC: 3.9 MMOL/L (ref 3.5–5.1)
RBC # BLD AUTO: 3.85 M/UL (ref 4–5.2)
SODIUM SERPL-SCNC: 138 MMOL/L (ref 136–145)
WBC # BLD AUTO: 7.7 K/UL (ref 4–11)

## 2023-10-02 PROCEDURE — 6370000000 HC RX 637 (ALT 250 FOR IP): Performed by: INTERNAL MEDICINE

## 2023-10-02 PROCEDURE — 2580000003 HC RX 258: Performed by: INTERNAL MEDICINE

## 2023-10-02 PROCEDURE — 6360000002 HC RX W HCPCS: Performed by: INTERNAL MEDICINE

## 2023-10-02 PROCEDURE — 2700000000 HC OXYGEN THERAPY PER DAY

## 2023-10-02 PROCEDURE — 99233 SBSQ HOSP IP/OBS HIGH 50: CPT | Performed by: INTERNAL MEDICINE

## 2023-10-02 PROCEDURE — 94761 N-INVAS EAR/PLS OXIMETRY MLT: CPT

## 2023-10-02 PROCEDURE — 2000000000 HC ICU R&B

## 2023-10-02 PROCEDURE — 85025 COMPLETE CBC W/AUTO DIFF WBC: CPT

## 2023-10-02 PROCEDURE — 94660 CPAP INITIATION&MGMT: CPT

## 2023-10-02 PROCEDURE — 94640 AIRWAY INHALATION TREATMENT: CPT

## 2023-10-02 PROCEDURE — 71045 X-RAY EXAM CHEST 1 VIEW: CPT

## 2023-10-02 PROCEDURE — 80048 BASIC METABOLIC PNL TOTAL CA: CPT

## 2023-10-02 PROCEDURE — 36415 COLL VENOUS BLD VENIPUNCTURE: CPT

## 2023-10-02 RX ADMIN — PIRFENIDONE 267 MG: 267 TABLET, FILM COATED ORAL at 08:19

## 2023-10-02 RX ADMIN — SERTRALINE 100 MG: 100 TABLET, FILM COATED ORAL at 08:17

## 2023-10-02 RX ADMIN — LORAZEPAM 0.5 MG: 0.5 TABLET ORAL at 15:10

## 2023-10-02 RX ADMIN — SENNOSIDES AND DOCUSATE SODIUM 2 TABLET: 8.6; 5 TABLET ORAL at 20:05

## 2023-10-02 RX ADMIN — QUETIAPINE FUMARATE 12.5 MG: 25 TABLET ORAL at 08:17

## 2023-10-02 RX ADMIN — TRAMADOL HYDROCHLORIDE 50 MG: 50 TABLET ORAL at 05:24

## 2023-10-02 RX ADMIN — ENOXAPARIN SODIUM 30 MG: 100 INJECTION SUBCUTANEOUS at 08:18

## 2023-10-02 RX ADMIN — PIRFENIDONE 267 MG: 267 TABLET, FILM COATED ORAL at 21:08

## 2023-10-02 RX ADMIN — IPRATROPIUM BROMIDE AND ALBUTEROL SULFATE 1 DOSE: .5; 2.5 SOLUTION RESPIRATORY (INHALATION) at 19:49

## 2023-10-02 RX ADMIN — LORAZEPAM 0.5 MG: 0.5 TABLET ORAL at 21:05

## 2023-10-02 RX ADMIN — IPRATROPIUM BROMIDE AND ALBUTEROL SULFATE 1 DOSE: .5; 2.5 SOLUTION RESPIRATORY (INHALATION) at 07:49

## 2023-10-02 RX ADMIN — BENZONATATE 100 MG: 100 CAPSULE ORAL at 20:05

## 2023-10-02 RX ADMIN — DEXAMETHASONE 6 MG: 4 TABLET ORAL at 08:17

## 2023-10-02 RX ADMIN — LORAZEPAM 0.5 MG: 0.5 TABLET ORAL at 08:29

## 2023-10-02 RX ADMIN — PANTOPRAZOLE SODIUM 40 MG: 40 TABLET, DELAYED RELEASE ORAL at 05:24

## 2023-10-02 RX ADMIN — LEVOFLOXACIN 750 MG: 750 TABLET, FILM COATED ORAL at 08:17

## 2023-10-02 RX ADMIN — PIRFENIDONE 267 MG: 267 TABLET, FILM COATED ORAL at 15:10

## 2023-10-02 RX ADMIN — SENNOSIDES AND DOCUSATE SODIUM 2 TABLET: 8.6; 5 TABLET ORAL at 08:17

## 2023-10-02 RX ADMIN — ENOXAPARIN SODIUM 30 MG: 100 INJECTION SUBCUTANEOUS at 20:05

## 2023-10-02 RX ADMIN — DILTIAZEM HYDROCHLORIDE 240 MG: 120 CAPSULE, COATED, EXTENDED RELEASE ORAL at 08:17

## 2023-10-02 RX ADMIN — QUETIAPINE FUMARATE 12.5 MG: 25 TABLET ORAL at 20:05

## 2023-10-02 RX ADMIN — IPRATROPIUM BROMIDE AND ALBUTEROL SULFATE 1 DOSE: .5; 2.5 SOLUTION RESPIRATORY (INHALATION) at 15:00

## 2023-10-02 RX ADMIN — LEVOTHYROXINE SODIUM 100 MCG: 100 TABLET ORAL at 05:24

## 2023-10-02 RX ADMIN — SODIUM CHLORIDE, PRESERVATIVE FREE 10 ML: 5 INJECTION INTRAVENOUS at 20:06

## 2023-10-02 RX ADMIN — ATORVASTATIN CALCIUM 40 MG: 40 TABLET, FILM COATED ORAL at 08:17

## 2023-10-02 ASSESSMENT — PAIN DESCRIPTION - ORIENTATION: ORIENTATION: MID;LOWER

## 2023-10-02 ASSESSMENT — PAIN DESCRIPTION - LOCATION: LOCATION: BACK

## 2023-10-02 ASSESSMENT — PAIN SCALES - GENERAL
PAINLEVEL_OUTOF10: 3
PAINLEVEL_OUTOF10: 2
PAINLEVEL_OUTOF10: 5

## 2023-10-02 ASSESSMENT — PAIN DESCRIPTION - DESCRIPTORS: DESCRIPTORS: ACHING

## 2023-10-02 ASSESSMENT — PAIN - FUNCTIONAL ASSESSMENT: PAIN_FUNCTIONAL_ASSESSMENT: PREVENTS OR INTERFERES SOME ACTIVE ACTIVITIES AND ADLS

## 2023-10-02 NOTE — PLAN OF CARE
Problem: Discharge Planning  Goal: Discharge to home or other facility with appropriate resources  Outcome: Progressing  Flowsheets (Taken 10/2/2023 6341)  Discharge to home or other facility with appropriate resources:   Identify barriers to discharge with patient and caregiver   Arrange for needed discharge resources and transportation as appropriate   Identify discharge learning needs (meds, wound care, etc)     Problem: Skin/Tissue Integrity  Goal: Absence of new skin breakdown  Description: 1. Monitor for areas of redness and/or skin breakdown  2. Assess vascular access sites hourly  3. Every 4-6 hours minimum:  Change oxygen saturation probe site  4. Every 4-6 hours:  If on nasal continuous positive airway pressure, respiratory therapy assess nares and determine need for appliance change or resting period.   Outcome: Progressing     Problem: Respiratory - Adult  Goal: Achieves optimal ventilation and oxygenation  Outcome: Progressing  Flowsheets (Taken 10/2/2023 4657)  Achieves optimal ventilation and oxygenation:   Position to facilitate oxygenation and minimize respiratory effort   Assess for changes in respiratory status   Assess for changes in mentation and behavior     Problem: Pain  Goal: Verbalizes/displays adequate comfort level or baseline comfort level  Outcome: Progressing  Flowsheets (Taken 10/2/2023 9514)  Verbalizes/displays adequate comfort level or baseline comfort level: Encourage patient to monitor pain and request assistance     Problem: Cardiovascular - Adult  Goal: Maintains optimal cardiac output and hemodynamic stability  Outcome: Progressing  Flowsheets (Taken 10/2/2023 9036)  Maintains optimal cardiac output and hemodynamic stability:   Monitor blood pressure and heart rate   Monitor urine output and notify Licensed Independent Practitioner for values outside of normal range   Assess for signs of decreased cardiac output   Patient provided a COPD Educational Folder that includes

## 2023-10-02 NOTE — CARE COORDINATION
INTERDISCIPLINARY PLAN OF CARE CONFERENCE    Date/Time: 10/2/2023 11:46 AM  Completed by: Elenita Grace RN, Case Management      Patient Name:  Jose Obando  YOB: 1955  Admitting Diagnosis: Thrombocytopenia ([address]) [D69.6]  Hypoxia [R09.02]  Acute respiratory failure with hypoxia (HCC) [J96.01]  Interstitial lung disease ([address]) [J84.9]  Dyspnea, unspecified type [R06.00]     Admit Date/Time:  9/25/2023  6:09 PM    Chart reviewed. Interdisciplinary team contacted or reviewed plan related to patient progress and discharge plans. Disciplines included Case Management, Nursing, and Dietitian. Current Status:9/25/23   PT/OT recommendation for discharge plan of care: SNF    Expected D/C Disposition:  TBD    Per the patient's nurse, the patient is doing better. The patient was out of bed yesterday. Met with patient to discuss SNF recommendation by PT/OT. The patient stated she will need to speak to her  about going to a SNF.  CM provided patient with the list.

## 2023-10-02 NOTE — PROGRESS NOTES
Shift assessment, completed, see flow sheet. Pt is alert and oriented x4. Following commands. VSS. Respirations are easy, even, and unlabored. Pt is greatly improved since the weekend, able to tolerate being on HFNC today. Bilateral lung sounds are diminished throughout with minimal wheezing noted. PIV x2 remain in place and patent with sites and dressings C/D/I. Caprice Cespedes remains in place and patent. Call light within reach. Bed in lowest position. Bed alarm on. No needs expressed at this time.

## 2023-10-02 NOTE — PROGRESS NOTES
Pt has had a couple of episodes while on HFNC where she calls out for help and her 02% drops into the 70's and HR increases to 130's. Pt transitioned to bipap and takes a couple of minutes to calm down and is then fine. RT made aware. Breathing tx's offered when available. Pt states she has been coughing up a lot of mucous today and that is generally when she has the episodes.

## 2023-10-02 NOTE — PROGRESS NOTES
10/02/23 1309   Encounter Summary   Encounter Overview/Reason  Spiritual/Emotional Needs; Rituals, Rites and Sacraments   Service Provided For: Patient   Referral/Consult From: Patient   Support System Spouse; Children   Last Encounter  10/02/23  ( provided prayer for healing; and blessing)   Complexity of Encounter Moderate   Begin Time 1230   End Time  1309   Total Time Calculated 39 min   Spiritual/Emotional needs   Type Spiritual Support   Rituals, Rites and Sacraments   Type Rite (Comment)  (confession)   Assessment/Intervention/Outcome   Assessment Coping;Fearful   Intervention Active listening;Prayer (assurance of)/[place];Sustaining Presence/Ministry of presence; Facilitated forgiveness   Outcome Comfort;Encouraged;Expressed Gratitude

## 2023-10-02 NOTE — PROGRESS NOTES
Pt A/O. On Vapo therm 30 Lt and FiO2 75%, Will be on BiPAP at night 16/8 RR 14 FiO2 45%. Venturi mask over the face PRN. 2 PIV's in place. SR on the monitor. Pure wick in place. Vs S. Assessment complete as charted. Repositioned for comfort. Call light in reach. Denies needs. Will continue to monitor.

## 2023-10-02 NOTE — PROGRESS NOTES
10/02/23 0339   NIV Type   Equipment Type v60   Mode Bilevel   Mask Type Full face mask   Mask Size Small   Assessment   Pulse 71   Respirations 23   SpO2 99 %   Comfort Level Good   Using Accessory Muscles No   Settings/Measurements   IPAP 16 cmH20   CPAP/EPAP 8 cmH2O   Vt (Measured) 620 mL   Rate Ordered 14   FiO2  45 %   Minute Volume (L/min) 16.8 Liters   Mask Leak (lpm) 0 lpm   Patient's Home Machine No   Alarm Settings   Alarms On Y

## 2023-10-02 NOTE — PROGRESS NOTES
Comprehensive Nutrition Assessment    Type and Reason for Visit:  Initial (LOS x 7 days)    Nutrition Recommendations/Plan:   Continue ADULT DIET; Regular; 4 carb choices per meal diet order. Continue Ensure high-protein with meals. Monitor appetite, meal intake, and acceptance/intake of ONS. Monitor nutrition-related labs, bowel function, and weight trends. Malnutrition Assessment:  Malnutrition Status:  Severe malnutrition (10/02/23 1105)    Context:  Acute Illness     Findings of the 6 clinical characteristics of malnutrition:  Energy Intake:  50% or less of estimated energy requirements for 5 or more days  Weight Loss:  Greater than 2% over 1 week (-11# or 8.7% weight loss since 9/25/23)     Body Fat Loss:  Unable to assess (COVID-19 +)     Muscle Mass Loss:  Unable to assess (COVID-19 +)    Fluid Accumulation:  No significant fluid accumulation     Strength:  Not Performed    Nutrition Assessment:    patient was nutritionally compromised upon admission AEB decreased appetite/po intake respiratory dysfunction r/t ILD and COVID-19 virus, however, she is improved from a nutritional standpoint AEB improved appetite/po intake and improved respiratory status during this admission; will continue ADULT DIET; Regular; 4 carb choices per meal diet order and Ensure high-protein with meals    Nutrition Related Findings:    patient is A & O x 4; patient has a hx of ILD and presented with worsening respiratory status + discovered she was COVID-19 + upon admission; patient has refused being turned and stays on her L side all the time; + BM on 9/30/23; she consumed 26-50% x 1 meal and % x 1 meal on 10/1/23; on high-dose SSI; patient is from home Wound Type: None       Current Nutrition Intake & Therapies:    Average Meal Intake: 26-50%, %  Average Supplements Intake: Unable to assess  ADULT DIET; Regular; 4 carb choices (60 gm/meal)  ADULT ORAL NUTRITION SUPPLEMENT; Breakfast, Lunch, Dinner;  Low Calorie/High Protein Oral Supplement    Anthropometric Measures:  Height: 5' 3\" (160 cm)  Ideal Body Weight (IBW): 115 lbs (52 kg)    Admission Body Weight: 124 lb (56.2 kg) (obtained on 9/25/23; actual weight)  Current Body Weight: 113 lb 3.2 oz (51.3 kg) (obtained on 10/2/23; actual weight), 98.4 % IBW.  Weight Source: Bed Scale  Current BMI (kg/m2): 20.1  Usual Body Weight: 124 lb (56.2 kg) (obtained on 9/25/23; actual weight)  % Weight Change (Calculated): -8.7  Weight Adjustment For: No Adjustment                 BMI Categories: Underweight (BMI less than 22) age over 72    Estimated Daily Nutrient Needs:  Energy Requirements Based On: Kcal/kg  Weight Used for Energy Requirements: Current  Energy (kcal/day): 1428 - 1530 kcals based on 28-30 kcals/kg/CBW  Weight Used for Protein Requirements: Current  Protein (g/day): 61 - 77 g protein based on 1.2-1.5 g/kg/CBW  Method Used for Fluid Requirements: 1 ml/kcal  Fluid (ml/day): 1428 - 1530 ml    Nutrition Diagnosis:   Inadequate oral intake related to inadequate protein-energy intake, impaired respiratory function, increase demand for energy/nutrients, catabolic illness as evidenced by poor intake prior to admission, intake 26-50%, lab values, weight loss greater than or equal to 2% in 1 week    Nutrition Interventions:   Food and/or Nutrient Delivery: Continue Current Diet, Continue Oral Nutrition Supplement  Nutrition Education/Counseling: No recommendation at this time  Coordination of Nutrition Care: Continue to monitor while inpatient, Coordination of Care, Interdisciplinary Rounds  Plan of Care discussed with: ICU Team    Goals:     Goals: Meet at least 75% of estimated needs, by next RD assessment       Nutrition Monitoring and Evaluation:   Behavioral-Environmental Outcomes: None Identified  Food/Nutrient Intake Outcomes: Food and Nutrient Intake, Supplement Intake  Physical Signs/Symptoms Outcomes: Biochemical Data, Meal Time Behavior, Skin,

## 2023-10-02 NOTE — PROGRESS NOTES
Care rounds completed with Dr. Felisha Hutchinson and multidisciplinary team. Reviewed labs, meds, VS, assessment, & plan of care for today. See dictated note and new orders for details. New orders received.

## 2023-10-02 NOTE — PROGRESS NOTES
10/01/23 2338   NIV Type   Equipment Type v60   Mode Bilevel   Mask Type Full face mask   Mask Size Small   Settings/Measurements   IPAP 16 cmH20   CPAP/EPAP 8 cmH2O   Vt (Measured) 655 mL   Rate Ordered 14   FiO2  45 %   Minute Volume (L/min) 19 Liters   Mask Leak (lpm) 0 lpm   Patient's Home Machine No   Patient Observation   Observations spo2 95% on 45% bipap

## 2023-10-03 LAB
GLUCOSE BLD-MCNC: 134 MG/DL (ref 70–99)
GLUCOSE BLD-MCNC: 229 MG/DL (ref 70–99)
PERFORMED ON: ABNORMAL
PERFORMED ON: ABNORMAL

## 2023-10-03 PROCEDURE — 6360000002 HC RX W HCPCS: Performed by: INTERNAL MEDICINE

## 2023-10-03 PROCEDURE — 6370000000 HC RX 637 (ALT 250 FOR IP): Performed by: INTERNAL MEDICINE

## 2023-10-03 PROCEDURE — 94640 AIRWAY INHALATION TREATMENT: CPT

## 2023-10-03 PROCEDURE — 97530 THERAPEUTIC ACTIVITIES: CPT

## 2023-10-03 PROCEDURE — 2580000003 HC RX 258: Performed by: INTERNAL MEDICINE

## 2023-10-03 PROCEDURE — 2700000000 HC OXYGEN THERAPY PER DAY

## 2023-10-03 PROCEDURE — 94660 CPAP INITIATION&MGMT: CPT

## 2023-10-03 PROCEDURE — 99233 SBSQ HOSP IP/OBS HIGH 50: CPT | Performed by: INTERNAL MEDICINE

## 2023-10-03 PROCEDURE — 94761 N-INVAS EAR/PLS OXIMETRY MLT: CPT

## 2023-10-03 PROCEDURE — 2060000000 HC ICU INTERMEDIATE R&B

## 2023-10-03 RX ADMIN — LORAZEPAM 0.5 MG: 0.5 TABLET ORAL at 08:23

## 2023-10-03 RX ADMIN — QUETIAPINE FUMARATE 12.5 MG: 25 TABLET ORAL at 08:23

## 2023-10-03 RX ADMIN — IPRATROPIUM BROMIDE AND ALBUTEROL SULFATE 1 DOSE: .5; 2.5 SOLUTION RESPIRATORY (INHALATION) at 20:03

## 2023-10-03 RX ADMIN — TRAMADOL HYDROCHLORIDE 50 MG: 50 TABLET ORAL at 06:15

## 2023-10-03 RX ADMIN — SENNOSIDES AND DOCUSATE SODIUM 2 TABLET: 8.6; 5 TABLET ORAL at 08:23

## 2023-10-03 RX ADMIN — IPRATROPIUM BROMIDE AND ALBUTEROL SULFATE 1 DOSE: .5; 2.5 SOLUTION RESPIRATORY (INHALATION) at 07:58

## 2023-10-03 RX ADMIN — PIRFENIDONE 267 MG: 267 TABLET, FILM COATED ORAL at 08:23

## 2023-10-03 RX ADMIN — SERTRALINE 100 MG: 100 TABLET, FILM COATED ORAL at 08:23

## 2023-10-03 RX ADMIN — ENOXAPARIN SODIUM 30 MG: 100 INJECTION SUBCUTANEOUS at 20:34

## 2023-10-03 RX ADMIN — LEVOTHYROXINE SODIUM 100 MCG: 100 TABLET ORAL at 05:56

## 2023-10-03 RX ADMIN — SODIUM CHLORIDE, PRESERVATIVE FREE 10 ML: 5 INJECTION INTRAVENOUS at 20:34

## 2023-10-03 RX ADMIN — PIRFENIDONE 267 MG: 267 TABLET, FILM COATED ORAL at 20:34

## 2023-10-03 RX ADMIN — QUETIAPINE FUMARATE 12.5 MG: 25 TABLET ORAL at 20:34

## 2023-10-03 RX ADMIN — SODIUM CHLORIDE, PRESERVATIVE FREE 10 ML: 5 INJECTION INTRAVENOUS at 08:23

## 2023-10-03 RX ADMIN — PANTOPRAZOLE SODIUM 40 MG: 40 TABLET, DELAYED RELEASE ORAL at 05:56

## 2023-10-03 RX ADMIN — DILTIAZEM HYDROCHLORIDE 240 MG: 120 CAPSULE, COATED, EXTENDED RELEASE ORAL at 08:23

## 2023-10-03 RX ADMIN — IPRATROPIUM BROMIDE AND ALBUTEROL SULFATE 1 DOSE: .5; 2.5 SOLUTION RESPIRATORY (INHALATION) at 13:00

## 2023-10-03 RX ADMIN — LORAZEPAM 0.5 MG: 0.5 TABLET ORAL at 13:59

## 2023-10-03 RX ADMIN — PIRFENIDONE 267 MG: 267 TABLET, FILM COATED ORAL at 16:11

## 2023-10-03 RX ADMIN — DEXAMETHASONE 6 MG: 4 TABLET ORAL at 08:23

## 2023-10-03 RX ADMIN — BENZONATATE 100 MG: 100 CAPSULE ORAL at 08:23

## 2023-10-03 RX ADMIN — ENOXAPARIN SODIUM 30 MG: 100 INJECTION SUBCUTANEOUS at 08:23

## 2023-10-03 RX ADMIN — ATORVASTATIN CALCIUM 40 MG: 40 TABLET, FILM COATED ORAL at 08:23

## 2023-10-03 ASSESSMENT — PAIN DESCRIPTION - DESCRIPTORS
DESCRIPTORS: DISCOMFORT
DESCRIPTORS: DISCOMFORT

## 2023-10-03 ASSESSMENT — PAIN SCALES - GENERAL
PAINLEVEL_OUTOF10: 5
PAINLEVEL_OUTOF10: 7
PAINLEVEL_OUTOF10: 0
PAINLEVEL_OUTOF10: 7

## 2023-10-03 ASSESSMENT — PAIN DESCRIPTION - LOCATION
LOCATION: BACK

## 2023-10-03 NOTE — PLAN OF CARE
Problem: Discharge Planning  Goal: Discharge to home or other facility with appropriate resources  Outcome: Progressing  Flowsheets (Taken 10/2/2023 2000)  Discharge to home or other facility with appropriate resources: Identify barriers to discharge with patient and caregiver     Problem: Skin/Tissue Integrity  Goal: Absence of new skin breakdown  Description: 1. Monitor for areas of redness and/or skin breakdown  2. Assess vascular access sites hourly  3. Every 4-6 hours minimum:  Change oxygen saturation probe site  4. Every 4-6 hours:  If on nasal continuous positive airway pressure, respiratory therapy assess nares and determine need for appliance change or resting period.   Outcome: Progressing     Problem: Respiratory - Adult  Goal: Achieves optimal ventilation and oxygenation  Outcome: Progressing     Problem: Cardiovascular - Adult  Goal: Maintains optimal cardiac output and hemodynamic stability  Outcome: Progressing  Flowsheets (Taken 10/2/2023 2000)  Maintains optimal cardiac output and hemodynamic stability: Monitor blood pressure and heart rate  Goal: Absence of cardiac dysrhythmias or at baseline  Outcome: Progressing     Problem: Pain  Goal: Verbalizes/displays adequate comfort level or baseline comfort level  Outcome: Progressing     Problem: ABCDS Injury Assessment  Goal: Absence of physical injury  Outcome: Progressing     Problem: Safety - Adult  Goal: Free from fall injury  Outcome: Progressing     Problem: Chronic Conditions and Co-morbidities  Goal: Patient's chronic conditions and co-morbidity symptoms are monitored and maintained or improved  Outcome: Progressing  Flowsheets (Taken 10/2/2023 2000)  Care Plan - Patient's Chronic Conditions and Co-Morbidity Symptoms are Monitored and Maintained or Improved: Monitor and assess patient's chronic conditions and comorbid symptoms for stability, deterioration, or improvement     Problem: Nutrition Deficit:  Goal: Optimize nutritional

## 2023-10-03 NOTE — PROGRESS NOTES
Inpatient Occupational Therapy Treatment    Unit: ICU  Date:  10/3/2023  Patient Name:    Amil Galeazzi Heim-Shelton  Admitting diagnosis: Thrombocytopenia ([address]) [D69.6]  Hypoxia [R09.02]  Acute respiratory failure with hypoxia (HCC) [J96.01]  Interstitial lung disease ([address]) [J84.9]  Dyspnea, unspecified type [R06.00]  Admit Date:  9/25/2023  Precautions/Restrictions/WB Status/ Lines/ Wounds/ Oxygen: Fall risk, Bed/chair alarm, Lines (IV, Supplemental O2 ( Venti mask and 9L), and external catheter), Telemetry, and Continuous pulse oximetry     Pt seen for cotreatment this date due to patient safety, patient endurance, complexity of condition, acute illness/injury, limited functional status information, and need for the assistance of 2 skilled therapists    Treatment Time:  0291-5609  Treatment Number:  4  Timed Code Treatment Minutes:  39 minutes  Total Treatment Minutes:  39 minutes    Patient Goals for Therapy: \"to get up into chair\"          Discharge Recommendations: SNF  DME needs for discharge: Defer to facility       Therapy recommendations for staff:   Assist of 1 for sitting EOB, stand pivot transfer bed to chair with gait belt and HHA    History of Present Illness: Per Dr Kolton Wesley H&P  77 y.o. female who presented to [facility] with past medical history of atrial fibrillation, COPD, HFpEF, GERD, type 2 diabetes, hypertension, hyperlipidemia, chronic hypoxic respiratory failure on 3 L presented to ED with chief complaint of shortness of breath     Patient was accepted from [facility] to be transferred to [facility]     Patient reported that shortness of breath started today progressively worsening thus called the ambulance no known alleviating factor, reported exacerbated with exertion no associate with fever chills abdominal pain chest pain or dysuria. Patient reports that her daughter visited her and does not know if she got the COVID from that.     Home Health S4 Level Recommendation:  NA    AM-PAC Score: AM-PAC Inpatient Daily Activity Raw Score: 14     Subjective:  Patient lying reclined in bed. Pt agreeable to this OT/PT session. Cognition:    A&O x4   Able to follow 2 step commands    Pain:   No  Location: N/A  Rating: NA /10  Pain Medicine Status: No request made; pt did request anxiety medication prior to getting out of bed, which nurse brought    Preadmission Environment:   Pt. Lives     Pt. Lives with spouse & dog. Spouse works 4pm-midnight 5 days/week. Daughter comes over 3+ days/week while  is gone. And 2 sons stay with her for 3-4 hours on the other evenings. She helps with bathing, meal prep, and laundry  Home environment:    one story home  Steps to enter first floor:   Ramp  Steps to second floor/basement: N/A  Bathroom:     walk in shower, grab bars in shower, shower chair , and comfort height toilet  Equipment owned:    rollator, manual WC, BSC, shower chair/bench, home O2 (4L, increasing to 10L with movementL) continous, nebulizer, and pulse ox    Preadmission Status:  Pt. Able to drive:    No  Pt. Fully independent with ADLs:  Yes (until recently)  Pt. Required assistance for:   Cleaning, Cooking, and Laundry   Pt. independent for functional transfers and utilized Manual W/C for mobility in home. Completes stand pivot transfers w/c to toilet. History of falls:    Yes  Home Health Services:  None    Objective:  Does this pt have an acute or acute on chronic diagnosis of CHF?  No    Balance:  Sitting:    SBA at EOB  Standing:    CGA with B HHA due to decreased balance and anxiety    Bed Mobility:   Supine to Sit:    Min A   Sit to Supine:   Not Tested  Rolling:   Not Tested  Scooting in sitting: CGA  Scooting in supine:  Not Tested     Transfers:    Sit to stand:    CGA  Stand to sit:    CGA  Bed to chair:     CGA with B HHA, cues for minimizing anxiety and maximizing breathing/O2 sats  Bed/ chair to standard toilet:  Not Tested  Bed/chair to [facility]:   Not Tested  Functional

## 2023-10-03 NOTE — PROGRESS NOTES
Inpatient Physical Therapy Treatment    Unit: ICU  Date:  10/3/2023  Patient Name:    oNrma Obando  Admitting diagnosis: Thrombocytopenia ([address]) [D69.6]  Hypoxia [R09.02]  Acute respiratory failure with hypoxia (HCC) [J96.01]  Interstitial lung disease ([address]) [J84.9]  Dyspnea, unspecified type [R06.00]  Admit Date:  9/25/2023  Precautions/Restrictions/WB Status/ Lines/ Wounds/ Oxygen: Fall risk, Bed/chair alarm, Lines (IV, Supplemental O2 ( 9L + intermittent use of nonrebreather), and external catheter), Telemetry, and Continuous pulse oximetry    Treatment Time:  5464-7792  Treatment Number:  4   Timed Code Treatment Minutes: 39 minutes  Total Treatment Minutes:  39  minutes    Patient Stated Goals for Therapy: wants to get up to the chair      Discharge Recommendations: SNF  DME needs for discharge: Defer to facility       Therapy recommendation for EMS Transport: can transport by wheelchair    Therapy recommendations for staff:   Assist of 1 for transfers with use of gait belt to/from chair    History of Present Illness:   Per Dr Lainey Venegas H&P \"76 y.o. female who presented to [facility] with past medical history of atrial fibrillation, COPD, HFpEF, GERD, type 2 diabetes, hypertension, hyperlipidemia, chronic hypoxic respiratory failure on 3 L presented to ED with chief complaint of shortness of breath     Patient was accepted from [facility] to be transferred to [facility]     Patient reported that shortness of breath started today progressively worsening thus called the ambulance no known alleviating factor, reported exacerbated with exertion no associate with fever chills abdominal pain chest pain or dysuria. Patient reports that her daughter visited her and does not know if she got the COVID from that. \"    Home Health S4 Level Recommendation:  NA        AM-PAC Mobility Score       AM-PAC Inpatient Mobility without Stair Climbing Raw Score : 13      Subjective  Patient lying reclined in bed need for additional oxygen      To be met in 6 visits:  1). Supine to/from sit: SBA  2). Sit to/from stand: Min A  - goal upgraded 10/3: Supervision  3). Bed to chair: Min A  - goal upgraded 10/3: Supervision  4). Gait: Ambulate  15 ft.   with Min A  and use of LRAD (least restrictive assistive device)  5). Tolerate B LE exercises 3 sets of 10-15 reps      Rehabilitation Potential: Fair  Strengths for achieving goals include:   Pt motivated, PLOF, Family Support, and Pt cooperative   Barriers to achieving goals include:    Complexity of condition, Chronicity of condition, and Weakness    Plan    To be seen 3-5 x / week  while in acute care setting for therapeutic exercises, bed mobility, transfers, progressive gait training, balance training, and family/patient education. Signature: Barbara Brock PT     If patient discharges from this facility prior to next visit, this note will serve as the Discharge Summary.

## 2023-10-03 NOTE — PROGRESS NOTES
10/02/23 2327   NIV Type   NIV Started/Stopped (S)  On   Equipment Type V60   Mode Bilevel   Mask Type Full face mask   Mask Size Small   Assessment   Pulse 100   Respirations 23   SpO2 94 %   Level of Consciousness 0   Comfort Level Good   Using Accessory Muscles No   Mask Compliance Good   Skin Assessment Clean, dry, & intact   Skin Protection for O2 Device Yes   Settings/Measurements   IPAP 16 cmH20   CPAP/EPAP 8 cmH2O   Vt (Measured) 602 mL   Rate Ordered 14   FiO2  45 %   Minute Volume (L/min) 16.2 Liters   Mask Leak (lpm) 11 lpm   Patient's Home Machine No   Alarm Settings   Alarms On Y

## 2023-10-03 NOTE — PROGRESS NOTES
10/02/23 2327   NIV Type   Equipment Type V60   Mode Bilevel   Mask Type Full face mask   Mask Size Small   Assessment   Pulse 100   Respirations 23   SpO2 94 %   Level of Consciousness 0   Comfort Level Good   Using Accessory Muscles No   Mask Compliance Good   Skin Assessment Clean, dry, & intact   Skin Protection for O2 Device Yes   Settings/Measurements   IPAP 16 cmH20   CPAP/EPAP 8 cmH2O   Vt (Measured) 602 mL   Rate Ordered 14   FiO2  45 %   Minute Volume (L/min) 16.2 Liters   Mask Leak (lpm) 11 lpm   Patient's Home Machine No   Alarm Settings   Alarms On Y

## 2023-10-03 NOTE — PROGRESS NOTES
10/03/23 0324   NIV Type   Equipment Type V60   Mode Bilevel   Mask Type Full face mask   Mask Size Small   Assessment   Pulse 74   Respirations 24   SpO2 97 %   Settings/Measurements   IPAP 16 cmH20   CPAP/EPAP 8 cmH2O   Vt (Measured) 579 mL   Rate Ordered 14   FiO2  45 %   Minute Volume (L/min) 13.6 Liters   Mask Leak (lpm) 0 lpm   Patient's Home Machine No   Alarm Settings   Alarms On Y

## 2023-10-03 NOTE — PROGRESS NOTES
AM assessment complete, see flowsheet. Medications given per MAR. Pt A&O x4, resting in bed eating breakfast at this time. Switched from Bipap to 10L HFNC, tolerating well. No other needs noted. Awaiting MD rounds.

## 2023-10-03 NOTE — CARE COORDINATION
Met with the patient to inquire if the patient is willing to go to a SNF. The patient stated she would like to go to [facility]. 10/3 - 15:00 - CM [facility] for [facility] requesting referral.     10/3/ - 3:32 - received call from [facility] that she will not have a bed until the 6th. DALLIN spoke with Lyndon BARTON who advised the patient is receiving 1-2 more days of antibiotic. The patient is not ready for discharge. CM left  for [facility] requesting a precert be started for the patient.

## 2023-10-04 LAB
GLUCOSE BLD-MCNC: 146 MG/DL (ref 70–99)
GLUCOSE BLD-MCNC: 174 MG/DL (ref 70–99)
GLUCOSE BLD-MCNC: 253 MG/DL (ref 70–99)
GLUCOSE BLD-MCNC: 277 MG/DL (ref 70–99)
PERFORMED ON: ABNORMAL

## 2023-10-04 PROCEDURE — 6370000000 HC RX 637 (ALT 250 FOR IP): Performed by: INTERNAL MEDICINE

## 2023-10-04 PROCEDURE — 6360000002 HC RX W HCPCS: Performed by: INTERNAL MEDICINE

## 2023-10-04 PROCEDURE — 99233 SBSQ HOSP IP/OBS HIGH 50: CPT | Performed by: INTERNAL MEDICINE

## 2023-10-04 PROCEDURE — 94660 CPAP INITIATION&MGMT: CPT

## 2023-10-04 PROCEDURE — 2700000000 HC OXYGEN THERAPY PER DAY

## 2023-10-04 PROCEDURE — 94761 N-INVAS EAR/PLS OXIMETRY MLT: CPT

## 2023-10-04 PROCEDURE — 2060000000 HC ICU INTERMEDIATE R&B

## 2023-10-04 PROCEDURE — 94640 AIRWAY INHALATION TREATMENT: CPT

## 2023-10-04 PROCEDURE — 2580000003 HC RX 258: Performed by: INTERNAL MEDICINE

## 2023-10-04 RX ADMIN — INSULIN LISPRO 4 UNITS: 100 INJECTION, SOLUTION INTRAVENOUS; SUBCUTANEOUS at 20:35

## 2023-10-04 RX ADMIN — SODIUM CHLORIDE, PRESERVATIVE FREE 10 ML: 5 INJECTION INTRAVENOUS at 20:39

## 2023-10-04 RX ADMIN — BENZONATATE 100 MG: 100 CAPSULE ORAL at 09:15

## 2023-10-04 RX ADMIN — DILTIAZEM HYDROCHLORIDE 240 MG: 120 CAPSULE, COATED, EXTENDED RELEASE ORAL at 09:10

## 2023-10-04 RX ADMIN — QUETIAPINE FUMARATE 12.5 MG: 25 TABLET ORAL at 09:10

## 2023-10-04 RX ADMIN — PIRFENIDONE 267 MG: 267 TABLET, FILM COATED ORAL at 20:50

## 2023-10-04 RX ADMIN — ATORVASTATIN CALCIUM 40 MG: 40 TABLET, FILM COATED ORAL at 09:11

## 2023-10-04 RX ADMIN — IPRATROPIUM BROMIDE AND ALBUTEROL SULFATE 1 DOSE: .5; 2.5 SOLUTION RESPIRATORY (INHALATION) at 19:41

## 2023-10-04 RX ADMIN — ENOXAPARIN SODIUM 30 MG: 100 INJECTION SUBCUTANEOUS at 20:36

## 2023-10-04 RX ADMIN — LORAZEPAM 0.5 MG: 0.5 TABLET ORAL at 00:15

## 2023-10-04 RX ADMIN — BENZONATATE 100 MG: 100 CAPSULE ORAL at 20:50

## 2023-10-04 RX ADMIN — QUETIAPINE FUMARATE 12.5 MG: 25 TABLET ORAL at 20:37

## 2023-10-04 RX ADMIN — TRAMADOL HYDROCHLORIDE 50 MG: 50 TABLET ORAL at 06:19

## 2023-10-04 RX ADMIN — TRAMADOL HYDROCHLORIDE 50 MG: 50 TABLET ORAL at 00:15

## 2023-10-04 RX ADMIN — DEXAMETHASONE 6 MG: 4 TABLET ORAL at 09:11

## 2023-10-04 RX ADMIN — PANTOPRAZOLE SODIUM 40 MG: 40 TABLET, DELAYED RELEASE ORAL at 06:16

## 2023-10-04 RX ADMIN — LORAZEPAM 0.5 MG: 0.5 TABLET ORAL at 09:15

## 2023-10-04 RX ADMIN — SERTRALINE 100 MG: 100 TABLET, FILM COATED ORAL at 09:11

## 2023-10-04 RX ADMIN — IPRATROPIUM BROMIDE AND ALBUTEROL SULFATE 1 DOSE: .5; 2.5 SOLUTION RESPIRATORY (INHALATION) at 13:26

## 2023-10-04 RX ADMIN — LORAZEPAM 0.5 MG: 0.5 TABLET ORAL at 15:00

## 2023-10-04 RX ADMIN — PIRFENIDONE 267 MG: 267 TABLET, FILM COATED ORAL at 16:44

## 2023-10-04 RX ADMIN — PIRFENIDONE 267 MG: 267 TABLET, FILM COATED ORAL at 09:10

## 2023-10-04 RX ADMIN — ACETAMINOPHEN 650 MG: 325 TABLET ORAL at 02:10

## 2023-10-04 RX ADMIN — BENZONATATE 100 MG: 100 CAPSULE ORAL at 00:15

## 2023-10-04 RX ADMIN — ENOXAPARIN SODIUM 30 MG: 100 INJECTION SUBCUTANEOUS at 09:11

## 2023-10-04 RX ADMIN — LEVOTHYROXINE SODIUM 100 MCG: 100 TABLET ORAL at 06:16

## 2023-10-04 RX ADMIN — SENNOSIDES AND DOCUSATE SODIUM 2 TABLET: 8.6; 5 TABLET ORAL at 20:37

## 2023-10-04 RX ADMIN — SODIUM CHLORIDE, PRESERVATIVE FREE 10 ML: 5 INJECTION INTRAVENOUS at 09:11

## 2023-10-04 ASSESSMENT — PAIN SCALES - GENERAL
PAINLEVEL_OUTOF10: 6
PAINLEVEL_OUTOF10: 0
PAINLEVEL_OUTOF10: 5
PAINLEVEL_OUTOF10: 0
PAINLEVEL_OUTOF10: 0
PAINLEVEL_OUTOF10: 8
PAINLEVEL_OUTOF10: 7
PAINLEVEL_OUTOF10: 7
PAINLEVEL_OUTOF10: 0

## 2023-10-04 ASSESSMENT — PAIN DESCRIPTION - ORIENTATION
ORIENTATION: MID;LOWER
ORIENTATION: MID

## 2023-10-04 ASSESSMENT — PAIN DESCRIPTION - DESCRIPTORS
DESCRIPTORS: ACHING

## 2023-10-04 ASSESSMENT — PAIN DESCRIPTION - LOCATION
LOCATION: HEAD
LOCATION: HEAD
LOCATION: BACK

## 2023-10-04 NOTE — PROGRESS NOTES
AM assessment complete, see flowsheet. Medications given per MAR. Pt in bed watching TV. Denies pain or any other needs. On 10L NC with venturi mask as needed. Awaiting MD rounds.

## 2023-10-04 NOTE — CONSULTS
Pt in ICU and spouse at bedside. Plan continues [place] for rehab. Pt is not interested in palliative/hospice options at this time. PC will sign off.

## 2023-10-04 NOTE — PROGRESS NOTES
10/03/23 2296   NIV Type   NIV Started/Stopped (S)  Off   Assessment   Pulse (!) 101   Respirations 23   SpO2 96 %

## 2023-10-04 NOTE — CARE COORDINATION
INTERDISCIPLINARY PLAN OF CARE CONFERENCE    Date/Time: 10/4/2023 9:18 AM  Completed by: Luiza Kirby RN, Case Management      Patient Name:  Fe Obando  YOB: 1955  Admitting Diagnosis: Thrombocytopenia ([address]) [D69.6]  Hypoxia [R09.02]  Acute respiratory failure with hypoxia (HCC) [J96.01]  Interstitial lung disease ([address]) [J84.9]  Dyspnea, unspecified type [R06.00]     Admit Date/Time:  9/25/2023  6:09 PM    Chart reviewed. Interdisciplinary team contacted or reviewed plan related to patient progress and discharge plans. Disciplines included Case Management, Nursing, and Dietitian. Current Status:9/25/23  PT/OT recommendation for discharge plan of care: SNF    Expected D/C Disposition:  Rehab  Confirmed plan with patient and/or family Yes confirmed with: (name) patient and daughter. 8:58 - Received call from [facility] advising they do not accept [employer]. 9:04 - Spoke with daughter, Nathaniel White regarding [facility]. Nathaniel White advised the patient has additional coverage. This coverage is not listed on the patient's facesheet. CM spoke to [address] ran the Medicare number as provided by the daughter Nathaniel White. [facility] still unable to accept the insurance as it still shows as [employer]. 11:39 - spoke to laura/Bhakti. The next choice is:    2) [facility]  3) [facility]    11:48 - Made referral to [facility]. Monica Tai will review and call CM back. [facility] accepted patient. Netta Gresham started precert.

## 2023-10-04 NOTE — PROGRESS NOTES
Pt A/O. On 10 lt High flow oxygen and BiPAP at night time 16/8 RR 14 FiO2 45%. 2 PIV's in place. Pure wick in place. Vs S. Assessment complete as charted. Repositioned for comfort. Call light in reach. Denies needs. Will continue to monitor.

## 2023-10-04 NOTE — PROGRESS NOTES
10/04/23 0330   NIV Type   NIV Started/Stopped (S)  On   Equipment Type v60   Mode Bilevel   Mask Type Full face mask   Mask Size Small   Assessment   Pulse 83   Respirations 28   SpO2 94 %   Level of Consciousness 0   Comfort Level Good   Using Accessory Muscles No   Mask Compliance Good   Skin Assessment Clean, dry, & intact   Skin Protection for O2 Device Yes   Settings/Measurements   IPAP 16 cmH20   CPAP/EPAP 8 cmH2O   Vt (Measured) 754 mL   Rate Ordered 14   FiO2  45 %   Minute Volume (L/min) 22.6 Liters   Mask Leak (lpm) 14 lpm   Patient's Home Machine No   Alarm Settings   Alarms On Y

## 2023-10-04 NOTE — PROGRESS NOTES
Care rounds completed with Dr. Sanjuana Weiner and multidisciplinary team. Reviewed labs, meds, VS, assessment, & plan of care for today. See dictated note and new orders for details.

## 2023-10-04 NOTE — PROGRESS NOTES
10/03/23 2008   NIV Type   Equipment Type V60   Mode Bilevel   Mask Type Full face mask   Mask Size Small   Assessment   Pulse 94   Respirations 28   SpO2 95 %   Level of Consciousness 0   Comfort Level Good   Using Accessory Muscles No   Mask Compliance Good   Breath Sounds   Right Upper Lobe Diminished   Right Middle Lobe Diminished   Right Lower Lobe Diminished   Left Upper Lobe Diminished   Left Lower Lobe Diminished   Settings/Measurements   IPAP 16 cmH20   CPAP/EPAP 8 cmH2O   Vt (Measured) 678 mL   Rate Ordered 14   FiO2  45 %   Minute Volume (L/min) 19.3 Liters   Mask Leak (lpm) 0 lpm   Patient's Home Machine No   Alarm Settings   Alarms On Y

## 2023-10-05 VITALS
HEART RATE: 94 BPM | BODY MASS INDEX: 16.69 KG/M2 | SYSTOLIC BLOOD PRESSURE: 121 MMHG | DIASTOLIC BLOOD PRESSURE: 80 MMHG | HEIGHT: 63 IN | OXYGEN SATURATION: 99 % | TEMPERATURE: 97.7 F | WEIGHT: 94.2 LBS | RESPIRATION RATE: 24 BRPM

## 2023-10-05 LAB
GLUCOSE BLD-MCNC: 113 MG/DL (ref 70–99)
GLUCOSE BLD-MCNC: 122 MG/DL (ref 70–99)
GLUCOSE BLD-MCNC: 157 MG/DL (ref 70–99)
GLUCOSE BLD-MCNC: 225 MG/DL (ref 70–99)
PERFORMED ON: ABNORMAL

## 2023-10-05 PROCEDURE — 2580000003 HC RX 258: Performed by: INTERNAL MEDICINE

## 2023-10-05 PROCEDURE — 94761 N-INVAS EAR/PLS OXIMETRY MLT: CPT

## 2023-10-05 PROCEDURE — 94640 AIRWAY INHALATION TREATMENT: CPT

## 2023-10-05 PROCEDURE — 6360000002 HC RX W HCPCS: Performed by: INTERNAL MEDICINE

## 2023-10-05 PROCEDURE — 6370000000 HC RX 637 (ALT 250 FOR IP): Performed by: INTERNAL MEDICINE

## 2023-10-05 PROCEDURE — 99239 HOSP IP/OBS DSCHRG MGMT >30: CPT | Performed by: INTERNAL MEDICINE

## 2023-10-05 PROCEDURE — 94660 CPAP INITIATION&MGMT: CPT

## 2023-10-05 PROCEDURE — 99233 SBSQ HOSP IP/OBS HIGH 50: CPT | Performed by: INTERNAL MEDICINE

## 2023-10-05 PROCEDURE — 2700000000 HC OXYGEN THERAPY PER DAY

## 2023-10-05 RX ORDER — LORAZEPAM 0.5 MG/1
0.5 TABLET ORAL EVERY 6 HOURS PRN
Qty: 12 TABLET | Refills: 0 | Status: SHIPPED | OUTPATIENT
Start: 2023-10-05 | End: 2023-10-08

## 2023-10-05 RX ORDER — QUETIAPINE FUMARATE 25 MG/1
12.5 TABLET, FILM COATED ORAL 2 TIMES DAILY
Qty: 60 TABLET | Refills: 0 | Status: ON HOLD
Start: 2023-10-05

## 2023-10-05 RX ORDER — IPRATROPIUM BROMIDE AND ALBUTEROL SULFATE 2.5; .5 MG/3ML; MG/3ML
3 SOLUTION RESPIRATORY (INHALATION) EVERY 4 HOURS PRN
Qty: 360 ML | Refills: 0 | Status: ON HOLD
Start: 2023-10-05

## 2023-10-05 RX ORDER — DEXAMETHASONE 6 MG/1
6 TABLET ORAL DAILY
Qty: 1 TABLET | Refills: 0
Start: 2023-10-06 | End: 2023-10-07

## 2023-10-05 RX ADMIN — LORAZEPAM 0.5 MG: 0.5 TABLET ORAL at 15:19

## 2023-10-05 RX ADMIN — PIRFENIDONE 267 MG: 267 TABLET, FILM COATED ORAL at 15:19

## 2023-10-05 RX ADMIN — IPRATROPIUM BROMIDE AND ALBUTEROL SULFATE 1 DOSE: .5; 2.5 SOLUTION RESPIRATORY (INHALATION) at 14:12

## 2023-10-05 RX ADMIN — ENOXAPARIN SODIUM 30 MG: 100 INJECTION SUBCUTANEOUS at 09:25

## 2023-10-05 RX ADMIN — IPRATROPIUM BROMIDE AND ALBUTEROL SULFATE 1 DOSE: .5; 2.5 SOLUTION RESPIRATORY (INHALATION) at 07:52

## 2023-10-05 RX ADMIN — PIRFENIDONE 267 MG: 267 TABLET, FILM COATED ORAL at 09:27

## 2023-10-05 RX ADMIN — SODIUM CHLORIDE, PRESERVATIVE FREE 10 ML: 5 INJECTION INTRAVENOUS at 09:26

## 2023-10-05 RX ADMIN — SENNOSIDES AND DOCUSATE SODIUM 2 TABLET: 8.6; 5 TABLET ORAL at 09:25

## 2023-10-05 RX ADMIN — DILTIAZEM HYDROCHLORIDE 240 MG: 120 CAPSULE, COATED, EXTENDED RELEASE ORAL at 09:25

## 2023-10-05 RX ADMIN — TRAMADOL HYDROCHLORIDE 50 MG: 50 TABLET ORAL at 00:42

## 2023-10-05 RX ADMIN — ATORVASTATIN CALCIUM 40 MG: 40 TABLET, FILM COATED ORAL at 09:27

## 2023-10-05 RX ADMIN — LEVOTHYROXINE SODIUM 100 MCG: 100 TABLET ORAL at 06:35

## 2023-10-05 RX ADMIN — LORAZEPAM 0.5 MG: 0.5 TABLET ORAL at 00:42

## 2023-10-05 RX ADMIN — QUETIAPINE FUMARATE 12.5 MG: 25 TABLET ORAL at 09:25

## 2023-10-05 RX ADMIN — PANTOPRAZOLE SODIUM 40 MG: 40 TABLET, DELAYED RELEASE ORAL at 06:35

## 2023-10-05 RX ADMIN — SERTRALINE 100 MG: 100 TABLET, FILM COATED ORAL at 09:26

## 2023-10-05 RX ADMIN — DEXAMETHASONE 6 MG: 4 TABLET ORAL at 09:26

## 2023-10-05 ASSESSMENT — PAIN SCALES - GENERAL
PAINLEVEL_OUTOF10: 0
PAINLEVEL_OUTOF10: 8
PAINLEVEL_OUTOF10: 0

## 2023-10-05 ASSESSMENT — COPD QUESTIONNAIRES
QUESTION1_COUGHFREQUENCY: 3
QUESTION3_CHESTTIGHTNESS: 2
QUESTION6_LEAVINGHOUSE: 5
QUESTION8_ENERGYLEVEL: 5
QUESTION5_HOMEACTIVITIES: 5
CAT_TOTALSCORE: 33
QUESTION7_SLEEPQUALITY: 5
QUESTION4_WALKINCLINE: 5
QUESTION2_CHESTPHLEGM: 3

## 2023-10-05 ASSESSMENT — PAIN DESCRIPTION - LOCATION: LOCATION: BACK

## 2023-10-05 NOTE — PROGRESS NOTES
Patient placed on BiPAP about an hour ago. Patient demonstrating anxiety and wants to speak with the Respiratory Therapist.    RT removed the patient from BiPAP per request of the patient. Patient reported pain in her back and requested Tramadol and Ativan. Medications administered.

## 2023-10-05 NOTE — PROGRESS NOTES
10/05/23 0040   NIV Type   NIV Started/Stopped (S)  Off   Assessment   Pulse 95   Respirations 17   SpO2 (!) 88 %

## 2023-10-05 NOTE — PROGRESS NOTES
Female RN arrived at bedside to assist the patient with being cleaned and changing the Pure Wick apparatus.

## 2023-10-05 NOTE — PROGRESS NOTES
Telephone report provided to PATIENTS [facility] staff. Questions answered. ETA 4:00PM for .

## 2023-10-05 NOTE — PROGRESS NOTES
Inpatient Occupational Therapy Treatment    Attempted OT tx however pt declined stating she was leaving today at 4:00 and wanted to conserve her energy for transport.     Electronically signed by Wilton Potts OT on 10/5/2023 at 12:43 PM

## 2023-10-05 NOTE — CARE COORDINATION
CM delivered 2nd IMM delivered within 4 hours for DC, verbal explanation of patient rights at bedside. Pt voiced understanding of discharge MCR rights and is agreeable to discharge.

## 2023-10-05 NOTE — ACP (ADVANCE CARE PLANNING)
DISCHARGE ORDER  Date/Time 10/5/2023 12:06 PM  Completed by: Shakila Price RN, Case Management    Patient Name: Juan Ledezma      : 1955  Admitting Diagnosis: Thrombocytopenia ([address]) [D69.6]; Hypoxia [R09.02]; Acute respiratory failure with hypoxia ([address]) [J96.01]; Interstitial lung disease ([address]) [J84.9]; Dyspnea, unspecified type [R06.00]      Admit order Date and Status:23  (verify MD's last order for status of admission)      Noted discharge order. If applicable PT/OT recommendation at Discharge: SNF  DME recommendation by PT/OT:  Confirmed discharge plan: Yes  with whom patient and daughter Otilia Maxwell. If pt confirmed DC plan does family need to be contacted by CM No if yes who______  Discharge Plan: The patient is discharging to [address] [place] [address]. Precert started by [place].

## 2023-10-05 NOTE — PROGRESS NOTES
Patient awake in bed knitting. She is A/O x 4 and in good spirits with jokes offered. Patient coughing up small green semi-solid mucus products. Breath sounds are diminished and clear. She does not appear to be in distress. She moves all extremities moderate to request.  She denies pain or discomfort at this time. Urine pure wick collection apparatus is working with urine yellow and sediment present. She swallows PO medications without difficulty.

## 2023-10-05 NOTE — DISCHARGE INSTR - DIET

## 2023-10-05 NOTE — DISCHARGE INSTR - COC
Home  Address: [address] [place] [address], Dyana Hernandez, [address] [place] [address]  Phone: [phone]  Fax:    [place] Manager/ signature: Electronically signed by Phi Crystal RN on 10/5/23 at 1:57 PM EDT    PHYSICIAN SECTION    Prognosis: Guarded    Condition at Discharge: Stable    Rehab Potential (if transferring to Rehab): Guarded    Recommended Labs or Other Treatments After Discharge: PT and OT. Start Prednisone after finishing Decadron    Physician Certification: I certify the above information and transfer of Randa Garcia  is necessary for the continuing treatment of the diagnosis listed and that she requires [address] for less 30 days.      Update Admission H&P: No change in H&P    PHYSICIAN SIGNATURE:  Electronically signed by Jeanne Messina MD on 10/5/23 at 2:14 PM EDT

## 2023-10-05 NOTE — PROGRESS NOTES
2360 ml   Net -1720 ml         Physical Exam:  Gen: resp distress- mild. Seen on Bipap  Eyes: PERRL. No sclera icterus. No conjunctival injection. ENT: No discharge. Pharynx clear. Neck: Trachea midline. No obvious mass. Resp: + accessory muscle use. + crackles. Bilateral wheezes +. No rhonchi. No dullness on percussion. CV: Regular rhythm. No murmur or rub. No edema. GI: Non-tender. Non-distended. No hernia. Skin: Warm and dry. No nodule on exposed extremities. Lymph: No cervical LAD. No supraclavicular LAD. M/S: No cyanosis. No joint deformity. No clubbing. Neuro: Awake. Alert. Moves all four extremities. Psych: Oriented x 3. No anxiety. Lab Data:  CBC:   No results for input(s): \"WBC\", \"RBC\", \"HGB\", \"HCT\", \"MCV\", \"RDW\", \"PLT\" in the last 72 hours. BMP:   No results for input(s): \"NA\", \"K\", \"CL\", \"CO2\", \"PHOS\", \"BUN\", \"CREATININE\", \"CA\" in the last 72 hours. BNP: No results for input(s): \"BNP\" in the last 72 hours. PT/INR:   No results for input(s): \"PROTIME\", \"INR\" in the last 72 hours. APTT:  No results for input(s): \"APTT\" in the last 72 hours. CARDIAC ENZYMES: No results for input(s): \"CKMB\", \"CKMBINDEX\", \"TROPONINI\" in the last 72 hours. Invalid input(s): \"CKTOTAL;3\"  FASTING LIPID PANEL:  Lab Results   Component Value Date/Time    CHOL 110 11/17/2021 02:00 PM    CHOL 182 02/17/2020 12:00 AM    HDL 51 11/17/2021 02:00 PM    TRIG 59 11/17/2021 02:00 PM     LIVER PROFILE:   No results for input(s): \"AST\", \"ALT\", \"ALB\", \"BILIDIR\", \"BILITOT\", \"ALKPHOS\" in the last 72 hours. XR CHEST PORTABLE   Final Result   Diffuse reticular opacities seen throughout both lungs suggesting   interstitial lung disease or fibrosis, not significantly changed         XR CHEST PORTABLE   Final Result   No change since the prior study.                Assessment & Plan:     Principal Problem:    Acute respiratory failure with hypoxia (HCC)  Active Problems:    Severe protein-calorie

## 2023-10-05 NOTE — PROGRESS NOTES
10/04/23 0951   NIV Type   NIV Started/Stopped (S)  On   Equipment Type V60   Mode Bilevel   Mask Type Full face mask   Mask Size Small   Assessment   Pulse (!) 101   Respirations 30   SpO2 91 %   Level of Consciousness 0   Comfort Level Good   Using Accessory Muscles No   Mask Compliance Good   Skin Assessment Clean, dry, & intact   Skin Protection for O2 Device Yes   Settings/Measurements   IPAP 16 cmH20   CPAP/EPAP 8 cmH2O   Vt (Measured) 760 mL   Rate Ordered 14   FiO2  45 %   Minute Volume (L/min) 23.1 Liters   Mask Leak (lpm) 15 lpm   Patient's Home Machine No   Electrical Safety Check Performed Yes

## 2023-10-05 NOTE — FLOWSHEET NOTE
10/05/23 0900   Vitals   Pulse 73   Heart Rate Source Monitor   Respirations 16   /71   MAP (Calculated) 90   MAP (mmHg) 87   BP Method Automatic   Level of Consciousness 0   Cardiac Rhythm Sinus rhythm   Oxygen Therapy   SpO2 100 %   O2 Device Nasal cannula   O2 Flow Rate (L/min) 8 L/min     Vital signs stable. Pt is alert and oriented X4 and denies any worsening SOB or pain at the moment. Nothing new noted on head to toe assessment. Lung sounds diminished. BiPAP removed and Pt placed back on 8L NC. Pt continues to saturate well on 8L NC. Pt is NSR on the monitor. Morning medication administration completed. Pt assisted with breakfast. Pt denies any further assistance at the moment. Will continue to monitor.

## 2023-10-05 NOTE — CARE COORDINATION
DISCHARGE ORDER  Date/Time 10/5/2023 2:11 PM  Completed by: Ruba Nash, RN, Case Management    Patient Name: Nika Scott    : 1955      Admit order Date and Status:23  Noted discharge order. (verify MD's last order for status of admission/Traditional Medicare 3 [place] Inpatient qualifying stay required for SNF)    Confirmed discharge plan with:              Patient:  Yes              When pt confirms DC plan does any support person need to be contacted by CM Yes if yes with daughter Edu Velasco. Discharge to Facility: [address] [facility] phone number for staff giving report:     [phone]     Pre-certification completed: Yes   Hospital Exemption Notification (HENS) completed: yes   Discharge orders and Continuity of Care faxed to facility:  Pulled from [facility]      Transportation:               Medical Transport explained with choice list offered to pt/family. Choice:No(no preference)  Agency used: [facility]   time:   16:00 to 16:30      Pt/family/Nursing/Facility aware of  time:   Yes Names: Patient, Bhakti/daughter, Gregory/Brandt Berumen RN, [address] [facility] has transport form. Ambulance form completed:  yes:      Date Last IMM Given: 10/5/23    Comments: Pt is being d/c'd to [facility] today. Pt's O2 sats are 91% on 8L. Discharge timeout done with [facility]. All discharge needs and concerns addressed. Discharging nurse to complete CAROLANN, reconcile AVS, and place final copy with patient's discharge packet. Discharging RN to ensure that written prescriptions for  Level II medications are sent with patient to the facility as per protocol.

## 2023-10-10 ENCOUNTER — TELEPHONE (OUTPATIENT)
Dept: FAMILY MEDICINE CLINIC | Age: 68
End: 2023-10-10

## 2023-10-10 NOTE — TELEPHONE ENCOUNTER
Care Transitions Initial Follow Up Call    Outreach made within 2 business days of discharge: No    Patient: Tariq Dubon Patient : 1955   MRN: [id number]  Reason for Admission: There are no discharge diagnoses documented for the most recent discharge. Discharge Date: 10/5/23       Spoke with: Nursing Home     Discharge department/facility: [facility]     TCM Interactive Patient Contact:  Was patient able to fill all prescriptions: Yes  Was patient instructed to bring all medications to the follow-up visit: Yes  Is patient taking all medications as directed in the discharge summary?  Yes  Does patient understand their discharge instructions: Yes  Does patient have questions or concerns that need addressed prior to 7-14 day follow up office visit: no    Scheduled appointment with PCP within 7-14 days    Follow Up  Future Appointments   Date Time Provider [address] [place] [address]   2023  9:30 AM Bright Spear MD [facility] [place]

## 2023-10-12 ENCOUNTER — HOSPITAL ENCOUNTER (INPATIENT)
Age: 68
LOS: 6 days | Discharge: HOME | DRG: 193 | End: 2023-10-18
Attending: EMERGENCY MEDICINE | Admitting: INTERNAL MEDICINE
Payer: MEDICARE

## 2023-10-12 ENCOUNTER — APPOINTMENT (OUTPATIENT)
Dept: GENERAL RADIOLOGY | Age: 68
DRG: 193 | End: 2023-10-12
Payer: MEDICARE

## 2023-10-12 DIAGNOSIS — J84.9 ILD (INTERSTITIAL LUNG DISEASE) (HCC): ICD-10-CM

## 2023-10-12 DIAGNOSIS — J96.11 CHRONIC HYPOXEMIC RESPIRATORY FAILURE (HCC): ICD-10-CM

## 2023-10-12 DIAGNOSIS — R06.02 SOB (SHORTNESS OF BREATH): ICD-10-CM

## 2023-10-12 DIAGNOSIS — F41.1 GAD (GENERALIZED ANXIETY DISORDER): ICD-10-CM

## 2023-10-12 DIAGNOSIS — S22.42XD CLOSED FRACTURE OF MULTIPLE RIBS OF LEFT SIDE WITH ROUTINE HEALING, SUBSEQUENT ENCOUNTER: ICD-10-CM

## 2023-10-12 DIAGNOSIS — J10.1 INFLUENZA A: ICD-10-CM

## 2023-10-12 DIAGNOSIS — Z51.81 THERAPEUTIC DRUG MONITORING: ICD-10-CM

## 2023-10-12 DIAGNOSIS — R45.1 AGITATION: ICD-10-CM

## 2023-10-12 DIAGNOSIS — J96.01 ACUTE RESPIRATORY FAILURE WITH HYPOXIA (HCC): Primary | ICD-10-CM

## 2023-10-12 PROBLEM — J96.20 ACUTE ON CHRONIC RESPIRATORY FAILURE (HCC): Status: ACTIVE | Noted: 2023-10-12

## 2023-10-12 LAB
ALBUMIN SERPL-MCNC: 3.3 G/DL (ref 3.4–5)
ALBUMIN/GLOB SERPL: 1.1 {RATIO} (ref 1.1–2.2)
ALP SERPL-CCNC: 86 U/L (ref 40–129)
ALT SERPL-CCNC: 34 U/L (ref 10–40)
ANION GAP SERPL CALCULATED.3IONS-SCNC: 10 MMOL/L (ref 3–16)
AST SERPL-CCNC: 34 U/L (ref 15–37)
BASE EXCESS BLDV CALC-SCNC: 3 MMOL/L (ref -3–3)
BASOPHILS # BLD: 0 K/UL (ref 0–0.2)
BASOPHILS NFR BLD: 0 %
BILIRUB SERPL-MCNC: 0.3 MG/DL (ref 0–1)
BUN SERPL-MCNC: 8 MG/DL (ref 7–20)
CALCIUM SERPL-MCNC: 8.9 MG/DL (ref 8.3–10.6)
CHLORIDE SERPL-SCNC: 100 MMOL/L (ref 99–110)
CO2 BLDV-SCNC: 28 MMOL/L
CO2 SERPL-SCNC: 24 MMOL/L (ref 21–32)
COHGB MFR BLDV: 11.8 % (ref 0–1.5)
CREAT SERPL-MCNC: <0.5 MG/DL (ref 0.6–1.2)
DEPRECATED RDW RBC AUTO: 15.1 % (ref 12.4–15.4)
EKG ATRIAL RATE: 94 BPM
EKG DIAGNOSIS: NORMAL
EKG P AXIS: 9 DEGREES
EKG P-R INTERVAL: 148 MS
EKG Q-T INTERVAL: 346 MS
EKG QRS DURATION: 88 MS
EKG QTC CALCULATION (BAZETT): 432 MS
EKG R AXIS: 31 DEGREES
EKG T AXIS: -9 DEGREES
EKG VENTRICULAR RATE: 94 BPM
EOSINOPHIL # BLD: 0 K/UL (ref 0–0.6)
EOSINOPHIL NFR BLD: 0 %
FLUAV RNA UPPER RESP QL NAA+PROBE: POSITIVE
FLUBV AG NPH QL: NEGATIVE
GFR SERPLBLD CREATININE-BSD FMLA CKD-EPI: >60 ML/MIN/{1.73_M2}
GLUCOSE BLD-MCNC: 127 MG/DL (ref 70–99)
GLUCOSE SERPL-MCNC: 110 MG/DL (ref 70–99)
HCO3 BLDV-SCNC: 27.1 MMOL/L (ref 23–29)
HCT VFR BLD AUTO: 38.8 % (ref 36–48)
HGB BLD-MCNC: 12.5 G/DL (ref 12–16)
LACTATE BLDV-SCNC: 1.6 MMOL/L (ref 0.4–1.9)
LYMPHOCYTES # BLD: 3.8 K/UL (ref 1–5.1)
LYMPHOCYTES NFR BLD: 25 %
MCH RBC QN AUTO: 27.2 PG (ref 26–34)
MCHC RBC AUTO-ENTMCNC: 32.4 G/DL (ref 31–36)
MCV RBC AUTO: 84.2 FL (ref 80–100)
METAMYELOCYTES NFR BLD MANUAL: 1 %
METHGB MFR BLDV: 0.3 %
MONOCYTES # BLD: 0.5 K/UL (ref 0–1.3)
MONOCYTES NFR BLD: 3 %
NEUTROPHILS # BLD: 10.9 K/UL (ref 1.7–7.7)
NEUTROPHILS NFR BLD: 71 %
NT-PROBNP SERPL-MCNC: 535 PG/ML (ref 0–124)
O2 CT VFR BLDV CALC: 16 VOL %
O2 THERAPY: ABNORMAL
OVALOCYTES BLD QL SMEAR: ABNORMAL
PCO2 BLDV: 39.8 MMHG (ref 40–50)
PERFORMED ON: ABNORMAL
PH BLDV: 7.45 [PH] (ref 7.35–7.45)
PLATELET # BLD AUTO: 159 K/UL (ref 135–450)
PLATELET BLD QL SMEAR: ADEQUATE
PMV BLD AUTO: 7.6 FL (ref 5–10.5)
PO2 BLDV: 52.2 MMHG (ref 25–40)
POTASSIUM SERPL-SCNC: 4.7 MMOL/L (ref 3.5–5.1)
PROCALCITONIN SERPL IA-MCNC: 0.05 NG/ML (ref 0–0.15)
PROT SERPL-MCNC: 6.3 G/DL (ref 6.4–8.2)
RBC # BLD AUTO: 4.6 M/UL (ref 4–5.2)
SAO2 % BLDV: 89 %
SLIDE REVIEW: ABNORMAL
SODIUM SERPL-SCNC: 134 MMOL/L (ref 136–145)
TROPONIN, HIGH SENSITIVITY: 24 NG/L (ref 0–14)
WBC # BLD AUTO: 15.2 K/UL (ref 4–11)

## 2023-10-12 PROCEDURE — 2700000000 HC OXYGEN THERAPY PER DAY

## 2023-10-12 PROCEDURE — 99223 1ST HOSP IP/OBS HIGH 75: CPT | Performed by: INTERNAL MEDICINE

## 2023-10-12 PROCEDURE — 2580000003 HC RX 258

## 2023-10-12 PROCEDURE — 84450 TRANSFERASE (AST) (SGOT): CPT

## 2023-10-12 PROCEDURE — 6370000000 HC RX 637 (ALT 250 FOR IP)

## 2023-10-12 PROCEDURE — 6360000002 HC RX W HCPCS

## 2023-10-12 PROCEDURE — 94640 AIRWAY INHALATION TREATMENT: CPT

## 2023-10-12 PROCEDURE — 94761 N-INVAS EAR/PLS OXIMETRY MLT: CPT

## 2023-10-12 PROCEDURE — 83880 ASSAY OF NATRIURETIC PEPTIDE: CPT

## 2023-10-12 PROCEDURE — 80053 COMPREHEN METABOLIC PANEL: CPT

## 2023-10-12 PROCEDURE — 85025 COMPLETE CBC W/AUTO DIFF WBC: CPT

## 2023-10-12 PROCEDURE — 6370000000 HC RX 637 (ALT 250 FOR IP): Performed by: INTERNAL MEDICINE

## 2023-10-12 PROCEDURE — 84132 ASSAY OF SERUM POTASSIUM: CPT

## 2023-10-12 PROCEDURE — 2000000000 HC ICU R&B

## 2023-10-12 PROCEDURE — 84145 PROCALCITONIN (PCT): CPT

## 2023-10-12 PROCEDURE — 87040 BLOOD CULTURE FOR BACTERIA: CPT

## 2023-10-12 PROCEDURE — 99285 EMERGENCY DEPT VISIT HI MDM: CPT

## 2023-10-12 PROCEDURE — 84460 ALANINE AMINO (ALT) (SGPT): CPT

## 2023-10-12 PROCEDURE — 83605 ASSAY OF LACTIC ACID: CPT

## 2023-10-12 PROCEDURE — 71045 X-RAY EXAM CHEST 1 VIEW: CPT

## 2023-10-12 PROCEDURE — 93010 ELECTROCARDIOGRAM REPORT: CPT | Performed by: INTERNAL MEDICINE

## 2023-10-12 PROCEDURE — 87804 INFLUENZA ASSAY W/OPTIC: CPT

## 2023-10-12 PROCEDURE — 84075 ASSAY ALKALINE PHOSPHATASE: CPT

## 2023-10-12 PROCEDURE — 36415 COLL VENOUS BLD VENIPUNCTURE: CPT

## 2023-10-12 PROCEDURE — 93005 ELECTROCARDIOGRAM TRACING: CPT | Performed by: EMERGENCY MEDICINE

## 2023-10-12 PROCEDURE — 82803 BLOOD GASES ANY COMBINATION: CPT

## 2023-10-12 PROCEDURE — 84484 ASSAY OF TROPONIN QUANT: CPT

## 2023-10-12 PROCEDURE — 6370000000 HC RX 637 (ALT 250 FOR IP): Performed by: EMERGENCY MEDICINE

## 2023-10-12 RX ORDER — SODIUM CHLORIDE 0.9 % (FLUSH) 0.9 %
10 SYRINGE (ML) INJECTION PRN
Status: DISCONTINUED | OUTPATIENT
Start: 2023-10-12 | End: 2023-10-18 | Stop reason: HOSPADM

## 2023-10-12 RX ORDER — POLYETHYLENE GLYCOL 3350 17 G/17G
17 POWDER, FOR SOLUTION ORAL DAILY PRN
Status: DISCONTINUED | OUTPATIENT
Start: 2023-10-12 | End: 2023-10-18 | Stop reason: HOSPADM

## 2023-10-12 RX ORDER — IPRATROPIUM BROMIDE AND ALBUTEROL SULFATE 2.5; .5 MG/3ML; MG/3ML
1 SOLUTION RESPIRATORY (INHALATION) ONCE
Status: COMPLETED | OUTPATIENT
Start: 2023-10-12 | End: 2023-10-12

## 2023-10-12 RX ORDER — ENOXAPARIN SODIUM 100 MG/ML
40 INJECTION SUBCUTANEOUS DAILY
Status: DISCONTINUED | OUTPATIENT
Start: 2023-10-12 | End: 2023-10-18 | Stop reason: HOSPADM

## 2023-10-12 RX ORDER — LORAZEPAM 0.5 MG/1
0.5 TABLET ORAL EVERY 6 HOURS PRN
Status: ON HOLD | COMMUNITY
End: 2023-10-17 | Stop reason: SDUPTHER

## 2023-10-12 RX ORDER — POTASSIUM CHLORIDE 750 MG/1
40 TABLET, EXTENDED RELEASE ORAL PRN
Status: DISCONTINUED | OUTPATIENT
Start: 2023-10-12 | End: 2023-10-18 | Stop reason: HOSPADM

## 2023-10-12 RX ORDER — SODIUM CHLORIDE 0.9 % (FLUSH) 0.9 %
5-40 SYRINGE (ML) INJECTION EVERY 12 HOURS SCHEDULED
Status: DISCONTINUED | OUTPATIENT
Start: 2023-10-12 | End: 2023-10-18 | Stop reason: HOSPADM

## 2023-10-12 RX ORDER — HYDROXYZINE HYDROCHLORIDE 10 MG/1
10 TABLET, FILM COATED ORAL 3 TIMES DAILY PRN
Status: DISCONTINUED | OUTPATIENT
Start: 2023-10-12 | End: 2023-10-18 | Stop reason: HOSPADM

## 2023-10-12 RX ORDER — IPRATROPIUM BROMIDE AND ALBUTEROL SULFATE 2.5; .5 MG/3ML; MG/3ML
1 SOLUTION RESPIRATORY (INHALATION) EVERY 6 HOURS
Status: DISCONTINUED | OUTPATIENT
Start: 2023-10-12 | End: 2023-10-12

## 2023-10-12 RX ORDER — PANTOPRAZOLE SODIUM 40 MG/1
40 TABLET, DELAYED RELEASE ORAL
Status: DISCONTINUED | OUTPATIENT
Start: 2023-10-13 | End: 2023-10-18 | Stop reason: HOSPADM

## 2023-10-12 RX ORDER — IPRATROPIUM BROMIDE AND ALBUTEROL SULFATE 2.5; .5 MG/3ML; MG/3ML
1 SOLUTION RESPIRATORY (INHALATION) 3 TIMES DAILY
Status: DISCONTINUED | OUTPATIENT
Start: 2023-10-12 | End: 2023-10-18

## 2023-10-12 RX ORDER — IPRATROPIUM BROMIDE AND ALBUTEROL SULFATE 2.5; .5 MG/3ML; MG/3ML
1 SOLUTION RESPIRATORY (INHALATION) EVERY 4 HOURS PRN
Status: DISCONTINUED | OUTPATIENT
Start: 2023-10-12 | End: 2023-10-18 | Stop reason: HOSPADM

## 2023-10-12 RX ORDER — ALBUTEROL SULFATE 90 UG/1
2 AEROSOL, METERED RESPIRATORY (INHALATION) EVERY 4 HOURS PRN
Status: DISCONTINUED | OUTPATIENT
Start: 2023-10-12 | End: 2023-10-18 | Stop reason: HOSPADM

## 2023-10-12 RX ORDER — NADOLOL 40 MG/1
20 TABLET ORAL DAILY
Status: DISCONTINUED | OUTPATIENT
Start: 2023-10-12 | End: 2023-10-18 | Stop reason: HOSPADM

## 2023-10-12 RX ORDER — LORAZEPAM 0.5 MG/1
0.5 TABLET ORAL EVERY 6 HOURS PRN
Status: DISCONTINUED | OUTPATIENT
Start: 2023-10-12 | End: 2023-10-18 | Stop reason: HOSPADM

## 2023-10-12 RX ORDER — PREDNISONE 5 MG/1
10 TABLET ORAL DAILY
Status: CANCELLED | OUTPATIENT
Start: 2023-10-12

## 2023-10-12 RX ORDER — DILTIAZEM HYDROCHLORIDE 120 MG/1
240 CAPSULE, COATED, EXTENDED RELEASE ORAL DAILY
Status: DISCONTINUED | OUTPATIENT
Start: 2023-10-12 | End: 2023-10-18 | Stop reason: HOSPADM

## 2023-10-12 RX ORDER — ACETAMINOPHEN 650 MG/1
650 SUPPOSITORY RECTAL EVERY 6 HOURS PRN
Status: DISCONTINUED | OUTPATIENT
Start: 2023-10-12 | End: 2023-10-18 | Stop reason: HOSPADM

## 2023-10-12 RX ORDER — SODIUM CHLORIDE 9 MG/ML
INJECTION, SOLUTION INTRAVENOUS CONTINUOUS
Status: DISCONTINUED | OUTPATIENT
Start: 2023-10-12 | End: 2023-10-14

## 2023-10-12 RX ORDER — SODIUM CHLORIDE 9 MG/ML
INJECTION, SOLUTION INTRAVENOUS PRN
Status: DISCONTINUED | OUTPATIENT
Start: 2023-10-12 | End: 2023-10-18 | Stop reason: HOSPADM

## 2023-10-12 RX ORDER — LEVOTHYROXINE SODIUM 0.1 MG/1
100 TABLET ORAL DAILY
Status: DISCONTINUED | OUTPATIENT
Start: 2023-10-12 | End: 2023-10-18 | Stop reason: HOSPADM

## 2023-10-12 RX ORDER — LEVOFLOXACIN 5 MG/ML
750 INJECTION, SOLUTION INTRAVENOUS EVERY 24 HOURS
Status: CANCELLED | OUTPATIENT
Start: 2023-10-12

## 2023-10-12 RX ORDER — LEVOFLOXACIN 500 MG/1
500 TABLET, FILM COATED ORAL
Status: ON HOLD | COMMUNITY
Start: 2023-10-11 | End: 2023-10-17 | Stop reason: HOSPADM

## 2023-10-12 RX ORDER — POTASSIUM CHLORIDE 7.45 MG/ML
10 INJECTION INTRAVENOUS PRN
Status: DISCONTINUED | OUTPATIENT
Start: 2023-10-12 | End: 2023-10-18 | Stop reason: HOSPADM

## 2023-10-12 RX ORDER — SERTRALINE HYDROCHLORIDE 100 MG/1
100 TABLET, FILM COATED ORAL DAILY
Status: DISCONTINUED | OUTPATIENT
Start: 2023-10-12 | End: 2023-10-18 | Stop reason: HOSPADM

## 2023-10-12 RX ORDER — ONDANSETRON 2 MG/ML
4 INJECTION INTRAMUSCULAR; INTRAVENOUS EVERY 6 HOURS PRN
Status: DISCONTINUED | OUTPATIENT
Start: 2023-10-12 | End: 2023-10-18 | Stop reason: HOSPADM

## 2023-10-12 RX ORDER — ATORVASTATIN CALCIUM 40 MG/1
40 TABLET, FILM COATED ORAL NIGHTLY
Status: DISCONTINUED | OUTPATIENT
Start: 2023-10-12 | End: 2023-10-18 | Stop reason: HOSPADM

## 2023-10-12 RX ORDER — MAGNESIUM SULFATE 1 G/100ML
1000 INJECTION INTRAVENOUS PRN
Status: DISCONTINUED | OUTPATIENT
Start: 2023-10-12 | End: 2023-10-18 | Stop reason: HOSPADM

## 2023-10-12 RX ORDER — ONDANSETRON 4 MG/1
4 TABLET, ORALLY DISINTEGRATING ORAL EVERY 8 HOURS PRN
Status: DISCONTINUED | OUTPATIENT
Start: 2023-10-12 | End: 2023-10-18 | Stop reason: HOSPADM

## 2023-10-12 RX ORDER — MIRTAZAPINE 15 MG/1
30 TABLET, FILM COATED ORAL NIGHTLY
Status: DISCONTINUED | OUTPATIENT
Start: 2023-10-12 | End: 2023-10-14

## 2023-10-12 RX ORDER — ACETAMINOPHEN 325 MG/1
650 TABLET ORAL EVERY 6 HOURS PRN
Status: DISCONTINUED | OUTPATIENT
Start: 2023-10-12 | End: 2023-10-18 | Stop reason: HOSPADM

## 2023-10-12 RX ORDER — QUETIAPINE FUMARATE 25 MG/1
12.5 TABLET, FILM COATED ORAL 2 TIMES DAILY
Status: DISCONTINUED | OUTPATIENT
Start: 2023-10-12 | End: 2023-10-18 | Stop reason: HOSPADM

## 2023-10-12 RX ORDER — FERROUS SULFATE 325(65) MG
325 TABLET ORAL
Status: DISCONTINUED | OUTPATIENT
Start: 2023-10-13 | End: 2023-10-18 | Stop reason: HOSPADM

## 2023-10-12 RX ORDER — OSELTAMIVIR PHOSPHATE 75 MG/1
75 CAPSULE ORAL 2 TIMES DAILY
Status: COMPLETED | OUTPATIENT
Start: 2023-10-12 | End: 2023-10-17

## 2023-10-12 RX ADMIN — DILTIAZEM HYDROCHLORIDE 240 MG: 120 CAPSULE, COATED, EXTENDED RELEASE ORAL at 18:53

## 2023-10-12 RX ADMIN — LEVOTHYROXINE SODIUM 100 MCG: 100 TABLET ORAL at 18:53

## 2023-10-12 RX ADMIN — MIRTAZAPINE 30 MG: 15 TABLET, FILM COATED ORAL at 21:10

## 2023-10-12 RX ADMIN — ATORVASTATIN CALCIUM 40 MG: 40 TABLET, FILM COATED ORAL at 21:10

## 2023-10-12 RX ADMIN — WATER 40 MG: 1 INJECTION INTRAMUSCULAR; INTRAVENOUS; SUBCUTANEOUS at 22:58

## 2023-10-12 RX ADMIN — QUETIAPINE FUMARATE 12.5 MG: 25 TABLET ORAL at 21:10

## 2023-10-12 RX ADMIN — Medication 10 ML: at 22:59

## 2023-10-12 RX ADMIN — SERTRALINE 100 MG: 100 TABLET, FILM COATED ORAL at 18:53

## 2023-10-12 RX ADMIN — IPRATROPIUM BROMIDE AND ALBUTEROL SULFATE 1 DOSE: .5; 2.5 SOLUTION RESPIRATORY (INHALATION) at 19:04

## 2023-10-12 RX ADMIN — NADOLOL 20 MG: 40 TABLET ORAL at 21:11

## 2023-10-12 RX ADMIN — OSELTAMIVIR PHOSPHATE 75 MG: 75 CAPSULE ORAL at 21:10

## 2023-10-12 RX ADMIN — HYDROXYZINE HYDROCHLORIDE 10 MG: 10 TABLET, FILM COATED ORAL at 22:58

## 2023-10-12 RX ADMIN — IPRATROPIUM BROMIDE AND ALBUTEROL SULFATE 1 DOSE: .5; 2.5 SOLUTION RESPIRATORY (INHALATION) at 13:36

## 2023-10-12 RX ADMIN — LORAZEPAM 0.5 MG: 0.5 TABLET ORAL at 18:59

## 2023-10-12 RX ADMIN — ENOXAPARIN SODIUM 40 MG: 100 INJECTION SUBCUTANEOUS at 18:54

## 2023-10-12 ASSESSMENT — LIFESTYLE VARIABLES
HOW MANY STANDARD DRINKS CONTAINING ALCOHOL DO YOU HAVE ON A TYPICAL DAY: PATIENT DOES NOT DRINK
HOW OFTEN DO YOU HAVE A DRINK CONTAINING ALCOHOL: NEVER
HOW OFTEN DO YOU HAVE A DRINK CONTAINING ALCOHOL: NEVER
HOW MANY STANDARD DRINKS CONTAINING ALCOHOL DO YOU HAVE ON A TYPICAL DAY: PATIENT DOES NOT DRINK

## 2023-10-12 ASSESSMENT — PAIN SCALES - GENERAL: PAINLEVEL_OUTOF10: 7

## 2023-10-12 ASSESSMENT — PAIN - FUNCTIONAL ASSESSMENT: PAIN_FUNCTIONAL_ASSESSMENT: 0-10

## 2023-10-12 NOTE — ED NOTES
[address] [address] care for consult   1326- Call returned from [facility] and spoke with Daniel Henriquez [facility]  10/12/23 1726 Silverio Chandler [facility]  10/12/23 1320

## 2023-10-12 NOTE — H&P
Hospital Medicine History & Physical      PCP: Hieu Abbasi MD    Date of Admission: 10/12/2023    Date of Service: Pt seen/examined on 10/12/23     Chief Complaint:    Chief Complaint   Patient presents with    Shortness of Breath     Pt coming from [address] home via EMS. Pt came to nursing home on 8L HF NC. Pt comes to ED on 15L non re-breather. Pt has history of Covid pneumonia, pt currently on Levaquin for pneumonia. Nursing home reports pt continuously de-stating through night. Said pt dropped as low as 50s when 911 was called and went into 70s while ambulating to toilet. Pt received . 5mg of ativan before arriving and breathing treatment         History Of Present Illness: The patient is a 77 y.o. female with PMH of PAF, DM, HTN, HLD, GERD, CHF, ILD, chronic resp failure, hypothyroidism, depression, anxiety, and recent COVID who presented to [facility] ED with complaint of increasing SOB from SNF. Pt reports she was dcd to SNF last week when she was admitted for covid infection with severe hypoxia. Pt usually wears 10 L o2 at baseline and apparently doing PT at SNF as she can tolerate. However her spo2 started dropping all night needing 15 L non rebreather and was sent to ER . no associated cough or fever or sputum . Reports no chest pain but feels overall weak and tired . Activity limited with dyspnea.       Workup showed hypoxia requiring needing vapotherm and flu A was positive   Pt remains comfortable on vapotherm    Past Medical History:        Diagnosis Date    A-fib ([address])     Acute cystitis without hematuria     Anxiety     CHF (congestive heart failure) (HCC)     COPD (chronic obstructive pulmonary disease) ([address])     Cryptogenic organizing pneumonia ([address])     Depression     Diabetes mellitus ([address])     GERD (gastroesophageal reflux disease)     Hyperlipidemia     Hypertension     ILD (interstitial lung disease) ([address])     On home oxygen therapy     uses O2 3L prn    Rash     Thyroid disease

## 2023-10-12 NOTE — ED PROVIDER NOTES
[facility] Emergency Department      CHIEF COMPLAINT  Shortness of Breath (Pt coming from [address] via EMS. Pt came to nursing home on 8L HF NC. Pt comes to ED on 15L non re-breather. Pt has history of Covid pneumonia, pt currently on Levaquin for pneumonia. Nursing home reports pt continuously de-stating through night. Said pt dropped as low as 50s when 911 was called and went into 70s while ambulating to toilet. Pt received . 5mg of ativan before arriving and breathing treatment)      HISTORY OF PRESENT ILLNESS  Courtney Knapp is a 77 y.o. female with a history of multiple medical problems including diabetes, atrial fibrillation, CHF, COPD and interstitial lung disease who is normally on 3 L of oxygen at home presents with shortness of breath and hypoxia. She was recently admitted with COVID and developed pneumonia. She was discharged to a nursing home on 8 L of high flow nasal cannula oxygen. Throughout the day yesterday and last night she was having desaturations. She states she was feeling short of breath. She states any movement or getting up to the bathroom her sats would drop into the 50s. She is currently on Levaquin for pneumonia. She denies chest pain or fever. .   No other complaints, modifying factors or associated symptoms. History obtained from the patient. I have reviewed the following from the nursing documentation.     Past Medical History:   Diagnosis Date    A-fib ([address])     Acute cystitis without hematuria     Anxiety     CHF (congestive heart failure) (HCC)     COPD (chronic obstructive pulmonary disease) ([address])     Cryptogenic organizing pneumonia ([address])     Depression     Diabetes mellitus ([address])     GERD (gastroesophageal reflux disease)     Hyperlipidemia     Hypertension     ILD (interstitial lung disease) ([address])     On home oxygen therapy     uses O2 3L prn    Rash     Thyroid disease     hypothyroidism     Past Surgical History:   Procedure

## 2023-10-12 NOTE — ED NOTES
Respiratory placed pt on heated high flow 8L, current O2 sat 96%.       Lizann Stager, RN  10/12/23 1663

## 2023-10-13 LAB
ANION GAP SERPL CALCULATED.3IONS-SCNC: 8 MMOL/L (ref 3–16)
BASOPHILS # BLD: 0 K/UL (ref 0–0.2)
BASOPHILS NFR BLD: 0.3 %
BUN SERPL-MCNC: 7 MG/DL (ref 7–20)
CALCIUM SERPL-MCNC: 8.6 MG/DL (ref 8.3–10.6)
CHLORIDE SERPL-SCNC: 103 MMOL/L (ref 99–110)
CO2 SERPL-SCNC: 31 MMOL/L (ref 21–32)
CREAT SERPL-MCNC: <0.5 MG/DL (ref 0.6–1.2)
DEPRECATED RDW RBC AUTO: 14.9 % (ref 12.4–15.4)
EOSINOPHIL # BLD: 0.1 K/UL (ref 0–0.6)
EOSINOPHIL NFR BLD: 1 %
GFR SERPLBLD CREATININE-BSD FMLA CKD-EPI: >60 ML/MIN/{1.73_M2}
GLUCOSE BLD-MCNC: 125 MG/DL (ref 70–99)
GLUCOSE BLD-MCNC: 196 MG/DL (ref 70–99)
GLUCOSE BLD-MCNC: 217 MG/DL (ref 70–99)
GLUCOSE BLD-MCNC: 323 MG/DL (ref 70–99)
GLUCOSE BLD-MCNC: 341 MG/DL (ref 70–99)
GLUCOSE SERPL-MCNC: 270 MG/DL (ref 70–99)
HCT VFR BLD AUTO: 36.8 % (ref 36–48)
HGB BLD-MCNC: 12.1 G/DL (ref 12–16)
LYMPHOCYTES # BLD: 0.6 K/UL (ref 1–5.1)
LYMPHOCYTES NFR BLD: 6.2 %
MCH RBC QN AUTO: 27.5 PG (ref 26–34)
MCHC RBC AUTO-ENTMCNC: 32.8 G/DL (ref 31–36)
MCV RBC AUTO: 84 FL (ref 80–100)
MONOCYTES # BLD: 0.1 K/UL (ref 0–1.3)
MONOCYTES NFR BLD: 1 %
NEUTROPHILS # BLD: 8.8 K/UL (ref 1.7–7.7)
NEUTROPHILS NFR BLD: 91.5 %
PERFORMED ON: ABNORMAL
PLATELET # BLD AUTO: 123 K/UL (ref 135–450)
PMV BLD AUTO: 8 FL (ref 5–10.5)
POTASSIUM SERPL-SCNC: 4.2 MMOL/L (ref 3.5–5.1)
RBC # BLD AUTO: 4.38 M/UL (ref 4–5.2)
SODIUM SERPL-SCNC: 142 MMOL/L (ref 136–145)
WBC # BLD AUTO: 9.6 K/UL (ref 4–11)

## 2023-10-13 PROCEDURE — 6360000002 HC RX W HCPCS

## 2023-10-13 PROCEDURE — 85025 COMPLETE CBC W/AUTO DIFF WBC: CPT

## 2023-10-13 PROCEDURE — 94761 N-INVAS EAR/PLS OXIMETRY MLT: CPT

## 2023-10-13 PROCEDURE — 99233 SBSQ HOSP IP/OBS HIGH 50: CPT | Performed by: INTERNAL MEDICINE

## 2023-10-13 PROCEDURE — 94640 AIRWAY INHALATION TREATMENT: CPT

## 2023-10-13 PROCEDURE — 99291 CRITICAL CARE FIRST HOUR: CPT | Performed by: INTERNAL MEDICINE

## 2023-10-13 PROCEDURE — 36415 COLL VENOUS BLD VENIPUNCTURE: CPT

## 2023-10-13 PROCEDURE — 2580000003 HC RX 258

## 2023-10-13 PROCEDURE — 80048 BASIC METABOLIC PNL TOTAL CA: CPT

## 2023-10-13 PROCEDURE — 2000000000 HC ICU R&B

## 2023-10-13 PROCEDURE — 6370000000 HC RX 637 (ALT 250 FOR IP): Performed by: INTERNAL MEDICINE

## 2023-10-13 PROCEDURE — 2700000000 HC OXYGEN THERAPY PER DAY

## 2023-10-13 PROCEDURE — 51798 US URINE CAPACITY MEASURE: CPT

## 2023-10-13 PROCEDURE — 6370000000 HC RX 637 (ALT 250 FOR IP)

## 2023-10-13 RX ORDER — PIRFENIDONE 267 MG/1
1 TABLET, FILM COATED ORAL 3 TIMES DAILY
Qty: 270 TABLET | Refills: 1 | Status: SHIPPED | OUTPATIENT
Start: 2023-10-13

## 2023-10-13 RX ORDER — PIRFENIDONE 267 MG/1
267 TABLET, FILM COATED ORAL 3 TIMES DAILY
Status: DISCONTINUED | OUTPATIENT
Start: 2023-10-13 | End: 2023-10-18 | Stop reason: HOSPADM

## 2023-10-13 RX ORDER — DEXTROSE MONOHYDRATE 100 MG/ML
INJECTION, SOLUTION INTRAVENOUS CONTINUOUS PRN
Status: DISCONTINUED | OUTPATIENT
Start: 2023-10-13 | End: 2023-10-18 | Stop reason: HOSPADM

## 2023-10-13 RX ORDER — INSULIN GLARGINE 100 [IU]/ML
10 INJECTION, SOLUTION SUBCUTANEOUS ONCE
Status: COMPLETED | OUTPATIENT
Start: 2023-10-13 | End: 2023-10-13

## 2023-10-13 RX ORDER — TRAMADOL HYDROCHLORIDE 50 MG/1
50 TABLET ORAL ONCE
Status: COMPLETED | OUTPATIENT
Start: 2023-10-13 | End: 2023-10-13

## 2023-10-13 RX ORDER — INSULIN LISPRO 100 [IU]/ML
0-8 INJECTION, SOLUTION INTRAVENOUS; SUBCUTANEOUS
Status: DISCONTINUED | OUTPATIENT
Start: 2023-10-13 | End: 2023-10-18 | Stop reason: HOSPADM

## 2023-10-13 RX ORDER — INSULIN LISPRO 100 [IU]/ML
0-4 INJECTION, SOLUTION INTRAVENOUS; SUBCUTANEOUS NIGHTLY
Status: DISCONTINUED | OUTPATIENT
Start: 2023-10-13 | End: 2023-10-18 | Stop reason: HOSPADM

## 2023-10-13 RX ADMIN — PIRFENIDONE 267 MG: 267 TABLET, FILM COATED ORAL at 15:24

## 2023-10-13 RX ADMIN — LORAZEPAM 0.5 MG: 0.5 TABLET ORAL at 20:32

## 2023-10-13 RX ADMIN — ENOXAPARIN SODIUM 40 MG: 100 INJECTION SUBCUTANEOUS at 09:15

## 2023-10-13 RX ADMIN — LORAZEPAM 0.5 MG: 0.5 TABLET ORAL at 11:31

## 2023-10-13 RX ADMIN — ACETAMINOPHEN 650 MG: 325 TABLET ORAL at 15:24

## 2023-10-13 RX ADMIN — PIRFENIDONE 267 MG: 267 TABLET, FILM COATED ORAL at 20:33

## 2023-10-13 RX ADMIN — ATORVASTATIN CALCIUM 40 MG: 40 TABLET, FILM COATED ORAL at 20:32

## 2023-10-13 RX ADMIN — WATER 40 MG: 1 INJECTION INTRAMUSCULAR; INTRAVENOUS; SUBCUTANEOUS at 09:16

## 2023-10-13 RX ADMIN — QUETIAPINE FUMARATE 12.5 MG: 25 TABLET ORAL at 09:14

## 2023-10-13 RX ADMIN — DILTIAZEM HYDROCHLORIDE 240 MG: 120 CAPSULE, COATED, EXTENDED RELEASE ORAL at 09:14

## 2023-10-13 RX ADMIN — SERTRALINE 100 MG: 100 TABLET, FILM COATED ORAL at 09:14

## 2023-10-13 RX ADMIN — MUPIROCIN: 20 OINTMENT TOPICAL at 20:48

## 2023-10-13 RX ADMIN — OSELTAMIVIR PHOSPHATE 75 MG: 75 CAPSULE ORAL at 20:32

## 2023-10-13 RX ADMIN — OSELTAMIVIR PHOSPHATE 75 MG: 75 CAPSULE ORAL at 09:15

## 2023-10-13 RX ADMIN — IPRATROPIUM BROMIDE AND ALBUTEROL SULFATE 1 DOSE: .5; 2.5 SOLUTION RESPIRATORY (INHALATION) at 11:46

## 2023-10-13 RX ADMIN — IPRATROPIUM BROMIDE AND ALBUTEROL SULFATE 1 DOSE: .5; 2.5 SOLUTION RESPIRATORY (INHALATION) at 18:55

## 2023-10-13 RX ADMIN — SODIUM CHLORIDE: 9 INJECTION, SOLUTION INTRAVENOUS at 04:00

## 2023-10-13 RX ADMIN — QUETIAPINE FUMARATE 12.5 MG: 25 TABLET ORAL at 20:32

## 2023-10-13 RX ADMIN — INSULIN LISPRO 2 UNITS: 100 INJECTION, SOLUTION INTRAVENOUS; SUBCUTANEOUS at 15:34

## 2023-10-13 RX ADMIN — Medication 10 ML: at 20:32

## 2023-10-13 RX ADMIN — LEVOTHYROXINE SODIUM 100 MCG: 100 TABLET ORAL at 06:17

## 2023-10-13 RX ADMIN — TRAMADOL HYDROCHLORIDE 50 MG: 50 TABLET ORAL at 16:53

## 2023-10-13 RX ADMIN — WATER 40 MG: 1 INJECTION INTRAMUSCULAR; INTRAVENOUS; SUBCUTANEOUS at 20:31

## 2023-10-13 RX ADMIN — Medication 10 ML: at 09:15

## 2023-10-13 RX ADMIN — IPRATROPIUM BROMIDE AND ALBUTEROL SULFATE 1 DOSE: .5; 2.5 SOLUTION RESPIRATORY (INHALATION) at 07:37

## 2023-10-13 RX ADMIN — LORAZEPAM 0.5 MG: 0.5 TABLET ORAL at 01:08

## 2023-10-13 RX ADMIN — FERROUS SULFATE TAB 325 MG (65 MG ELEMENTAL FE) 325 MG: 325 (65 FE) TAB at 09:14

## 2023-10-13 RX ADMIN — HYDROXYZINE HYDROCHLORIDE 10 MG: 10 TABLET, FILM COATED ORAL at 15:28

## 2023-10-13 RX ADMIN — INSULIN GLARGINE 10 UNITS: 100 INJECTION, SOLUTION SUBCUTANEOUS at 09:22

## 2023-10-13 RX ADMIN — INSULIN LISPRO 6 UNITS: 100 INJECTION, SOLUTION INTRAVENOUS; SUBCUTANEOUS at 12:55

## 2023-10-13 RX ADMIN — SODIUM CHLORIDE: 9 INJECTION, SOLUTION INTRAVENOUS at 13:05

## 2023-10-13 RX ADMIN — PIRFENIDONE 267 MG: 267 TABLET, FILM COATED ORAL at 12:01

## 2023-10-13 RX ADMIN — PANTOPRAZOLE SODIUM 40 MG: 40 TABLET, DELAYED RELEASE ORAL at 06:17

## 2023-10-13 NOTE — TELEPHONE ENCOUNTER
Gale from [address] [place] [address] L/ stating that pt needs a refill script for Prifenidone 267 mg. Refill script pending if appropriate.

## 2023-10-14 LAB
ANION GAP SERPL CALCULATED.3IONS-SCNC: 6 MMOL/L (ref 3–16)
BASOPHILS # BLD: 0 K/UL (ref 0–0.2)
BASOPHILS NFR BLD: 0.3 %
BUN SERPL-MCNC: 11 MG/DL (ref 7–20)
CALCIUM SERPL-MCNC: 8.1 MG/DL (ref 8.3–10.6)
CHLORIDE SERPL-SCNC: 99 MMOL/L (ref 99–110)
CO2 SERPL-SCNC: 28 MMOL/L (ref 21–32)
CREAT SERPL-MCNC: <0.5 MG/DL (ref 0.6–1.2)
DEPRECATED RDW RBC AUTO: 14.6 % (ref 12.4–15.4)
EOSINOPHIL # BLD: 0 K/UL (ref 0–0.6)
EOSINOPHIL NFR BLD: 0 %
GFR SERPLBLD CREATININE-BSD FMLA CKD-EPI: >60 ML/MIN/{1.73_M2}
GLUCOSE BLD-MCNC: 199 MG/DL (ref 70–99)
GLUCOSE BLD-MCNC: 225 MG/DL (ref 70–99)
GLUCOSE BLD-MCNC: 285 MG/DL (ref 70–99)
GLUCOSE BLD-MCNC: 313 MG/DL (ref 70–99)
GLUCOSE SERPL-MCNC: 343 MG/DL (ref 70–99)
HCT VFR BLD AUTO: 29 % (ref 36–48)
HCT VFR BLD AUTO: 30.5 % (ref 36–48)
HGB BLD-MCNC: 10.1 G/DL (ref 12–16)
HGB BLD-MCNC: 9.7 G/DL (ref 12–16)
LYMPHOCYTES # BLD: 0.4 K/UL (ref 1–5.1)
LYMPHOCYTES NFR BLD: 3.6 %
MCH RBC QN AUTO: 27.8 PG (ref 26–34)
MCHC RBC AUTO-ENTMCNC: 33.3 G/DL (ref 31–36)
MCV RBC AUTO: 83.3 FL (ref 80–100)
MONOCYTES # BLD: 0.2 K/UL (ref 0–1.3)
MONOCYTES NFR BLD: 2.1 %
NEUTROPHILS # BLD: 10.4 K/UL (ref 1.7–7.7)
NEUTROPHILS NFR BLD: 94 %
PERFORMED ON: ABNORMAL
PLATELET # BLD AUTO: 101 K/UL (ref 135–450)
PMV BLD AUTO: 7.8 FL (ref 5–10.5)
POTASSIUM SERPL-SCNC: 3.9 MMOL/L (ref 3.5–5.1)
RBC # BLD AUTO: 3.48 M/UL (ref 4–5.2)
SODIUM SERPL-SCNC: 133 MMOL/L (ref 136–145)
WBC # BLD AUTO: 11.1 K/UL (ref 4–11)

## 2023-10-14 PROCEDURE — 99291 CRITICAL CARE FIRST HOUR: CPT | Performed by: INTERNAL MEDICINE

## 2023-10-14 PROCEDURE — 05H933Z INSERTION OF INFUSION DEVICE INTO RIGHT BRACHIAL VEIN, PERCUTANEOUS APPROACH: ICD-10-PCS | Performed by: INTERNAL MEDICINE

## 2023-10-14 PROCEDURE — 2700000000 HC OXYGEN THERAPY PER DAY

## 2023-10-14 PROCEDURE — 85018 HEMOGLOBIN: CPT

## 2023-10-14 PROCEDURE — 94761 N-INVAS EAR/PLS OXIMETRY MLT: CPT

## 2023-10-14 PROCEDURE — 6370000000 HC RX 637 (ALT 250 FOR IP): Performed by: INTERNAL MEDICINE

## 2023-10-14 PROCEDURE — 80048 BASIC METABOLIC PNL TOTAL CA: CPT

## 2023-10-14 PROCEDURE — 6370000000 HC RX 637 (ALT 250 FOR IP)

## 2023-10-14 PROCEDURE — 6360000002 HC RX W HCPCS

## 2023-10-14 PROCEDURE — 2580000003 HC RX 258

## 2023-10-14 PROCEDURE — 2500000003 HC RX 250 WO HCPCS: Performed by: INTERNAL MEDICINE

## 2023-10-14 PROCEDURE — 36415 COLL VENOUS BLD VENIPUNCTURE: CPT

## 2023-10-14 PROCEDURE — 6360000002 HC RX W HCPCS: Performed by: INTERNAL MEDICINE

## 2023-10-14 PROCEDURE — 85025 COMPLETE CBC W/AUTO DIFF WBC: CPT

## 2023-10-14 PROCEDURE — 85014 HEMATOCRIT: CPT

## 2023-10-14 PROCEDURE — 36410 VNPNXR 3YR/> PHY/QHP DX/THER: CPT

## 2023-10-14 PROCEDURE — 2000000000 HC ICU R&B

## 2023-10-14 PROCEDURE — 94640 AIRWAY INHALATION TREATMENT: CPT

## 2023-10-14 PROCEDURE — 99233 SBSQ HOSP IP/OBS HIGH 50: CPT | Performed by: INTERNAL MEDICINE

## 2023-10-14 RX ORDER — TRAMADOL HYDROCHLORIDE 50 MG/1
50 TABLET ORAL EVERY 6 HOURS PRN
Status: DISCONTINUED | OUTPATIENT
Start: 2023-10-14 | End: 2023-10-18 | Stop reason: HOSPADM

## 2023-10-14 RX ORDER — MORPHINE SULFATE 2 MG/ML
1 INJECTION, SOLUTION INTRAMUSCULAR; INTRAVENOUS EVERY 4 HOURS PRN
Status: DISCONTINUED | OUTPATIENT
Start: 2023-10-14 | End: 2023-10-14

## 2023-10-14 RX ORDER — TRAMADOL HYDROCHLORIDE 50 MG/1
50 TABLET ORAL EVERY 6 HOURS PRN
Status: ON HOLD | COMMUNITY
End: 2023-10-17 | Stop reason: SDUPTHER

## 2023-10-14 RX ORDER — BENZONATATE 100 MG/1
200 CAPSULE ORAL 3 TIMES DAILY PRN
Status: DISCONTINUED | OUTPATIENT
Start: 2023-10-14 | End: 2023-10-18 | Stop reason: HOSPADM

## 2023-10-14 RX ORDER — MIRTAZAPINE 15 MG/1
15 TABLET, FILM COATED ORAL NIGHTLY
Status: DISCONTINUED | OUTPATIENT
Start: 2023-10-14 | End: 2023-10-18 | Stop reason: HOSPADM

## 2023-10-14 RX ORDER — OXYMETAZOLINE HYDROCHLORIDE 0.05 G/100ML
2 SPRAY NASAL 2 TIMES DAILY
Status: COMPLETED | OUTPATIENT
Start: 2023-10-14 | End: 2023-10-17

## 2023-10-14 RX ORDER — EPINEPHRINE 1 MG/ML
0.1 INJECTION, SOLUTION INTRAMUSCULAR; SUBCUTANEOUS ONCE
Status: DISCONTINUED | OUTPATIENT
Start: 2023-10-14 | End: 2023-10-14

## 2023-10-14 RX ORDER — TRANEXAMIC ACID 100 MG/ML
100 INJECTION, SOLUTION INTRAVENOUS ONCE
Status: COMPLETED | OUTPATIENT
Start: 2023-10-14 | End: 2023-10-14

## 2023-10-14 RX ADMIN — PIRFENIDONE 267 MG: 267 TABLET, FILM COATED ORAL at 13:43

## 2023-10-14 RX ADMIN — HYDROXYZINE HYDROCHLORIDE 10 MG: 10 TABLET, FILM COATED ORAL at 22:40

## 2023-10-14 RX ADMIN — TRANEXAMIC ACID 100 MG: 100 INJECTION, SOLUTION INTRAVENOUS at 21:18

## 2023-10-14 RX ADMIN — Medication 10 ML: at 20:45

## 2023-10-14 RX ADMIN — SILVER NITRATE 1 EACH: 38.21; 12.74 STICK TOPICAL at 20:11

## 2023-10-14 RX ADMIN — BENZONATATE 200 MG: 100 CAPSULE ORAL at 16:51

## 2023-10-14 RX ADMIN — INSULIN LISPRO 2 UNITS: 100 INJECTION, SOLUTION INTRAVENOUS; SUBCUTANEOUS at 11:53

## 2023-10-14 RX ADMIN — MIRTAZAPINE 15 MG: 15 TABLET, FILM COATED ORAL at 20:44

## 2023-10-14 RX ADMIN — LORAZEPAM 0.5 MG: 0.5 TABLET ORAL at 05:43

## 2023-10-14 RX ADMIN — ATORVASTATIN CALCIUM 40 MG: 40 TABLET, FILM COATED ORAL at 20:44

## 2023-10-14 RX ADMIN — WATER 40 MG: 1 INJECTION INTRAMUSCULAR; INTRAVENOUS; SUBCUTANEOUS at 08:18

## 2023-10-14 RX ADMIN — FERROUS SULFATE TAB 325 MG (65 MG ELEMENTAL FE) 325 MG: 325 (65 FE) TAB at 08:18

## 2023-10-14 RX ADMIN — PIRFENIDONE 267 MG: 267 TABLET, FILM COATED ORAL at 20:45

## 2023-10-14 RX ADMIN — PIRFENIDONE 267 MG: 267 TABLET, FILM COATED ORAL at 08:19

## 2023-10-14 RX ADMIN — MUPIROCIN: 20 OINTMENT TOPICAL at 08:19

## 2023-10-14 RX ADMIN — DILTIAZEM HYDROCHLORIDE 240 MG: 120 CAPSULE, COATED, EXTENDED RELEASE ORAL at 08:18

## 2023-10-14 RX ADMIN — OSELTAMIVIR PHOSPHATE 75 MG: 75 CAPSULE ORAL at 08:18

## 2023-10-14 RX ADMIN — TRAMADOL HYDROCHLORIDE 50 MG: 50 TABLET ORAL at 16:51

## 2023-10-14 RX ADMIN — MUPIROCIN: 20 OINTMENT TOPICAL at 20:44

## 2023-10-14 RX ADMIN — INSULIN LISPRO 4 UNITS: 100 INJECTION, SOLUTION INTRAVENOUS; SUBCUTANEOUS at 07:39

## 2023-10-14 RX ADMIN — OSELTAMIVIR PHOSPHATE 75 MG: 75 CAPSULE ORAL at 20:44

## 2023-10-14 RX ADMIN — IPRATROPIUM BROMIDE AND ALBUTEROL SULFATE 1 DOSE: .5; 2.5 SOLUTION RESPIRATORY (INHALATION) at 07:56

## 2023-10-14 RX ADMIN — IPRATROPIUM BROMIDE AND ALBUTEROL SULFATE 1 DOSE: .5; 2.5 SOLUTION RESPIRATORY (INHALATION) at 19:33

## 2023-10-14 RX ADMIN — QUETIAPINE FUMARATE 12.5 MG: 25 TABLET ORAL at 20:44

## 2023-10-14 RX ADMIN — SERTRALINE 100 MG: 100 TABLET, FILM COATED ORAL at 08:18

## 2023-10-14 RX ADMIN — ENOXAPARIN SODIUM 40 MG: 100 INJECTION SUBCUTANEOUS at 08:19

## 2023-10-14 RX ADMIN — HYDROXYZINE HYDROCHLORIDE 10 MG: 10 TABLET, FILM COATED ORAL at 00:04

## 2023-10-14 RX ADMIN — PANTOPRAZOLE SODIUM 40 MG: 40 TABLET, DELAYED RELEASE ORAL at 05:43

## 2023-10-14 RX ADMIN — QUETIAPINE FUMARATE 12.5 MG: 25 TABLET ORAL at 08:18

## 2023-10-14 RX ADMIN — Medication 2 SPRAY: at 20:45

## 2023-10-14 RX ADMIN — LORAZEPAM 0.5 MG: 0.5 TABLET ORAL at 13:42

## 2023-10-14 RX ADMIN — TRAMADOL HYDROCHLORIDE 50 MG: 50 TABLET ORAL at 22:40

## 2023-10-14 RX ADMIN — HYDROXYZINE HYDROCHLORIDE 10 MG: 10 TABLET, FILM COATED ORAL at 15:58

## 2023-10-14 RX ADMIN — LORAZEPAM 0.5 MG: 0.5 TABLET ORAL at 20:44

## 2023-10-14 RX ADMIN — MORPHINE SULFATE 1 MG: 2 INJECTION, SOLUTION INTRAMUSCULAR; INTRAVENOUS at 19:35

## 2023-10-14 RX ADMIN — INSULIN LISPRO 6 UNITS: 100 INJECTION, SOLUTION INTRAVENOUS; SUBCUTANEOUS at 16:32

## 2023-10-14 RX ADMIN — LEVOTHYROXINE SODIUM 100 MCG: 100 TABLET ORAL at 05:43

## 2023-10-14 RX ADMIN — IPRATROPIUM BROMIDE AND ALBUTEROL SULFATE 1 DOSE: .5; 2.5 SOLUTION RESPIRATORY (INHALATION) at 15:19

## 2023-10-14 ASSESSMENT — PAIN SCALES - GENERAL
PAINLEVEL_OUTOF10: 3
PAINLEVEL_OUTOF10: 6
PAINLEVEL_OUTOF10: 4
PAINLEVEL_OUTOF10: 8

## 2023-10-14 ASSESSMENT — PAIN DESCRIPTION - DESCRIPTORS: DESCRIPTORS: ACHING

## 2023-10-14 ASSESSMENT — PAIN DESCRIPTION - LOCATION
LOCATION: BACK
LOCATION: NOSE

## 2023-10-14 ASSESSMENT — PAIN DESCRIPTION - ORIENTATION: ORIENTATION: MID

## 2023-10-15 LAB
ANION GAP SERPL CALCULATED.3IONS-SCNC: 7 MMOL/L (ref 3–16)
BASOPHILS # BLD: 0 K/UL (ref 0–0.2)
BASOPHILS NFR BLD: 0.1 %
BUN SERPL-MCNC: 13 MG/DL (ref 7–20)
CALCIUM SERPL-MCNC: 8.5 MG/DL (ref 8.3–10.6)
CHLORIDE SERPL-SCNC: 99 MMOL/L (ref 99–110)
CO2 SERPL-SCNC: 34 MMOL/L (ref 21–32)
CREAT SERPL-MCNC: <0.5 MG/DL (ref 0.6–1.2)
DEPRECATED RDW RBC AUTO: 15 % (ref 12.4–15.4)
EOSINOPHIL # BLD: 0.1 K/UL (ref 0–0.6)
EOSINOPHIL NFR BLD: 0.5 %
GFR SERPLBLD CREATININE-BSD FMLA CKD-EPI: >60 ML/MIN/{1.73_M2}
GLUCOSE BLD-MCNC: 151 MG/DL (ref 70–99)
GLUCOSE BLD-MCNC: 181 MG/DL (ref 70–99)
GLUCOSE BLD-MCNC: 227 MG/DL (ref 70–99)
GLUCOSE BLD-MCNC: 253 MG/DL (ref 70–99)
GLUCOSE SERPL-MCNC: 162 MG/DL (ref 70–99)
HCT VFR BLD AUTO: 29.8 % (ref 36–48)
HGB BLD-MCNC: 9.8 G/DL (ref 12–16)
LYMPHOCYTES # BLD: 1.3 K/UL (ref 1–5.1)
LYMPHOCYTES NFR BLD: 9.1 %
MCH RBC QN AUTO: 27.5 PG (ref 26–34)
MCHC RBC AUTO-ENTMCNC: 32.9 G/DL (ref 31–36)
MCV RBC AUTO: 83.7 FL (ref 80–100)
MONOCYTES # BLD: 0.8 K/UL (ref 0–1.3)
MONOCYTES NFR BLD: 5.7 %
NEUTROPHILS # BLD: 12.5 K/UL (ref 1.7–7.7)
NEUTROPHILS NFR BLD: 84.6 %
PERFORMED ON: ABNORMAL
PLATELET # BLD AUTO: 125 K/UL (ref 135–450)
PMV BLD AUTO: 7.6 FL (ref 5–10.5)
POTASSIUM SERPL-SCNC: 4.1 MMOL/L (ref 3.5–5.1)
RBC # BLD AUTO: 3.56 M/UL (ref 4–5.2)
SODIUM SERPL-SCNC: 140 MMOL/L (ref 136–145)
WBC # BLD AUTO: 14.8 K/UL (ref 4–11)

## 2023-10-15 PROCEDURE — 80048 BASIC METABOLIC PNL TOTAL CA: CPT

## 2023-10-15 PROCEDURE — 2580000003 HC RX 258

## 2023-10-15 PROCEDURE — 99291 CRITICAL CARE FIRST HOUR: CPT | Performed by: INTERNAL MEDICINE

## 2023-10-15 PROCEDURE — 2000000000 HC ICU R&B

## 2023-10-15 PROCEDURE — 6370000000 HC RX 637 (ALT 250 FOR IP)

## 2023-10-15 PROCEDURE — 94640 AIRWAY INHALATION TREATMENT: CPT

## 2023-10-15 PROCEDURE — 2700000000 HC OXYGEN THERAPY PER DAY

## 2023-10-15 PROCEDURE — 99233 SBSQ HOSP IP/OBS HIGH 50: CPT | Performed by: INTERNAL MEDICINE

## 2023-10-15 PROCEDURE — 6370000000 HC RX 637 (ALT 250 FOR IP): Performed by: INTERNAL MEDICINE

## 2023-10-15 PROCEDURE — 5A09357 ASSISTANCE WITH RESPIRATORY VENTILATION, LESS THAN 24 CONSECUTIVE HOURS, CONTINUOUS POSITIVE AIRWAY PRESSURE: ICD-10-PCS

## 2023-10-15 PROCEDURE — 85025 COMPLETE CBC W/AUTO DIFF WBC: CPT

## 2023-10-15 PROCEDURE — 94761 N-INVAS EAR/PLS OXIMETRY MLT: CPT

## 2023-10-15 PROCEDURE — 94660 CPAP INITIATION&MGMT: CPT

## 2023-10-15 RX ADMIN — TRAMADOL HYDROCHLORIDE 50 MG: 50 TABLET ORAL at 09:25

## 2023-10-15 RX ADMIN — IPRATROPIUM BROMIDE AND ALBUTEROL SULFATE 1 DOSE: .5; 2.5 SOLUTION RESPIRATORY (INHALATION) at 07:13

## 2023-10-15 RX ADMIN — MUPIROCIN: 20 OINTMENT TOPICAL at 20:40

## 2023-10-15 RX ADMIN — QUETIAPINE FUMARATE 12.5 MG: 25 TABLET ORAL at 20:40

## 2023-10-15 RX ADMIN — OSELTAMIVIR PHOSPHATE 75 MG: 75 CAPSULE ORAL at 20:40

## 2023-10-15 RX ADMIN — FERROUS SULFATE TAB 325 MG (65 MG ELEMENTAL FE) 325 MG: 325 (65 FE) TAB at 09:24

## 2023-10-15 RX ADMIN — MUPIROCIN: 20 OINTMENT TOPICAL at 09:30

## 2023-10-15 RX ADMIN — IPRATROPIUM BROMIDE AND ALBUTEROL SULFATE 1 DOSE: .5; 2.5 SOLUTION RESPIRATORY (INHALATION) at 13:36

## 2023-10-15 RX ADMIN — HYDROXYZINE HYDROCHLORIDE 10 MG: 10 TABLET, FILM COATED ORAL at 09:24

## 2023-10-15 RX ADMIN — PIRFENIDONE 267 MG: 267 TABLET, FILM COATED ORAL at 14:41

## 2023-10-15 RX ADMIN — OSELTAMIVIR PHOSPHATE 75 MG: 75 CAPSULE ORAL at 09:25

## 2023-10-15 RX ADMIN — PIRFENIDONE 267 MG: 267 TABLET, FILM COATED ORAL at 09:26

## 2023-10-15 RX ADMIN — TRAMADOL HYDROCHLORIDE 50 MG: 50 TABLET ORAL at 18:00

## 2023-10-15 RX ADMIN — ATORVASTATIN CALCIUM 40 MG: 40 TABLET, FILM COATED ORAL at 20:40

## 2023-10-15 RX ADMIN — IPRATROPIUM BROMIDE AND ALBUTEROL SULFATE 1 DOSE: .5; 2.5 SOLUTION RESPIRATORY (INHALATION) at 19:28

## 2023-10-15 RX ADMIN — LORAZEPAM 0.5 MG: 0.5 TABLET ORAL at 09:24

## 2023-10-15 RX ADMIN — LEVOTHYROXINE SODIUM 100 MCG: 100 TABLET ORAL at 05:13

## 2023-10-15 RX ADMIN — PANTOPRAZOLE SODIUM 40 MG: 40 TABLET, DELAYED RELEASE ORAL at 05:13

## 2023-10-15 RX ADMIN — Medication 10 ML: at 09:25

## 2023-10-15 RX ADMIN — QUETIAPINE FUMARATE 12.5 MG: 25 TABLET ORAL at 09:24

## 2023-10-15 RX ADMIN — INSULIN LISPRO 2 UNITS: 100 INJECTION, SOLUTION INTRAVENOUS; SUBCUTANEOUS at 17:02

## 2023-10-15 RX ADMIN — PIRFENIDONE 267 MG: 267 TABLET, FILM COATED ORAL at 20:39

## 2023-10-15 RX ADMIN — LORAZEPAM 0.5 MG: 0.5 TABLET ORAL at 02:40

## 2023-10-15 RX ADMIN — Medication 10 ML: at 20:40

## 2023-10-15 RX ADMIN — NADOLOL 20 MG: 40 TABLET ORAL at 09:26

## 2023-10-15 RX ADMIN — ACETAMINOPHEN 650 MG: 325 TABLET ORAL at 11:08

## 2023-10-15 RX ADMIN — MIRTAZAPINE 15 MG: 15 TABLET, FILM COATED ORAL at 20:40

## 2023-10-15 RX ADMIN — DILTIAZEM HYDROCHLORIDE 240 MG: 120 CAPSULE, COATED, EXTENDED RELEASE ORAL at 09:24

## 2023-10-15 RX ADMIN — Medication 2 SPRAY: at 20:40

## 2023-10-15 RX ADMIN — PREDNISONE 30 MG: 20 TABLET ORAL at 09:24

## 2023-10-15 RX ADMIN — HYDROXYZINE HYDROCHLORIDE 10 MG: 10 TABLET, FILM COATED ORAL at 18:00

## 2023-10-15 RX ADMIN — Medication 2 SPRAY: at 10:00

## 2023-10-15 RX ADMIN — LORAZEPAM 0.5 MG: 0.5 TABLET ORAL at 18:00

## 2023-10-15 RX ADMIN — SERTRALINE 100 MG: 100 TABLET, FILM COATED ORAL at 09:24

## 2023-10-15 ASSESSMENT — PAIN SCALES - GENERAL
PAINLEVEL_OUTOF10: 3
PAINLEVEL_OUTOF10: 6
PAINLEVEL_OUTOF10: 7
PAINLEVEL_OUTOF10: 0

## 2023-10-15 ASSESSMENT — PAIN DESCRIPTION - ORIENTATION
ORIENTATION: LOWER;MID
ORIENTATION: MID;LOWER

## 2023-10-15 ASSESSMENT — PAIN DESCRIPTION - DESCRIPTORS
DESCRIPTORS: ACHING

## 2023-10-15 ASSESSMENT — PAIN DESCRIPTION - LOCATION
LOCATION: BACK
LOCATION: BACK
LOCATION: HEAD

## 2023-10-16 ENCOUNTER — APPOINTMENT (OUTPATIENT)
Dept: INTERVENTIONAL RADIOLOGY/VASCULAR | Age: 68
DRG: 193 | End: 2023-10-16
Payer: MEDICARE

## 2023-10-16 PROBLEM — R04.0 EPISTAXIS: Status: ACTIVE | Noted: 2023-10-16

## 2023-10-16 LAB
BACTERIA BLD CULT ORG #2: NORMAL
BACTERIA BLD CULT: NORMAL
GLUCOSE BLD-MCNC: 107 MG/DL (ref 70–99)
GLUCOSE BLD-MCNC: 181 MG/DL (ref 70–99)
GLUCOSE BLD-MCNC: 196 MG/DL (ref 70–99)
GLUCOSE BLD-MCNC: 316 MG/DL (ref 70–99)
PERFORMED ON: ABNORMAL

## 2023-10-16 PROCEDURE — C1751 CATH, INF, PER/CENT/MIDLINE: HCPCS

## 2023-10-16 PROCEDURE — 97530 THERAPEUTIC ACTIVITIES: CPT

## 2023-10-16 PROCEDURE — 6370000000 HC RX 637 (ALT 250 FOR IP): Performed by: INTERNAL MEDICINE

## 2023-10-16 PROCEDURE — 2580000003 HC RX 258

## 2023-10-16 PROCEDURE — 6370000000 HC RX 637 (ALT 250 FOR IP)

## 2023-10-16 PROCEDURE — 97161 PT EVAL LOW COMPLEX 20 MIN: CPT

## 2023-10-16 PROCEDURE — 2700000000 HC OXYGEN THERAPY PER DAY

## 2023-10-16 PROCEDURE — 94761 N-INVAS EAR/PLS OXIMETRY MLT: CPT

## 2023-10-16 PROCEDURE — 99221 1ST HOSP IP/OBS SF/LOW 40: CPT | Performed by: STUDENT IN AN ORGANIZED HEALTH CARE EDUCATION/TRAINING PROGRAM

## 2023-10-16 PROCEDURE — 97165 OT EVAL LOW COMPLEX 30 MIN: CPT

## 2023-10-16 PROCEDURE — 2060000000 HC ICU INTERMEDIATE R&B

## 2023-10-16 PROCEDURE — 99233 SBSQ HOSP IP/OBS HIGH 50: CPT | Performed by: INTERNAL MEDICINE

## 2023-10-16 PROCEDURE — 94640 AIRWAY INHALATION TREATMENT: CPT

## 2023-10-16 RX ORDER — PREDNISONE 20 MG/1
20 TABLET ORAL DAILY
Status: DISCONTINUED | OUTPATIENT
Start: 2023-10-16 | End: 2023-10-18

## 2023-10-16 RX ADMIN — PIRFENIDONE 267 MG: 267 TABLET, FILM COATED ORAL at 17:01

## 2023-10-16 RX ADMIN — IPRATROPIUM BROMIDE AND ALBUTEROL SULFATE 1 DOSE: .5; 2.5 SOLUTION RESPIRATORY (INHALATION) at 14:34

## 2023-10-16 RX ADMIN — PANTOPRAZOLE SODIUM 40 MG: 40 TABLET, DELAYED RELEASE ORAL at 06:21

## 2023-10-16 RX ADMIN — Medication 10 ML: at 08:44

## 2023-10-16 RX ADMIN — NADOLOL 20 MG: 40 TABLET ORAL at 12:19

## 2023-10-16 RX ADMIN — MUPIROCIN: 20 OINTMENT TOPICAL at 20:36

## 2023-10-16 RX ADMIN — QUETIAPINE FUMARATE 12.5 MG: 25 TABLET ORAL at 20:29

## 2023-10-16 RX ADMIN — ACETAMINOPHEN 650 MG: 325 TABLET ORAL at 13:41

## 2023-10-16 RX ADMIN — TRAMADOL HYDROCHLORIDE 50 MG: 50 TABLET ORAL at 00:06

## 2023-10-16 RX ADMIN — MIRTAZAPINE 15 MG: 15 TABLET, FILM COATED ORAL at 20:31

## 2023-10-16 RX ADMIN — INSULIN LISPRO 6 UNITS: 100 INJECTION, SOLUTION INTRAVENOUS; SUBCUTANEOUS at 17:00

## 2023-10-16 RX ADMIN — Medication 2 SPRAY: at 20:37

## 2023-10-16 RX ADMIN — DILTIAZEM HYDROCHLORIDE 240 MG: 120 CAPSULE, COATED, EXTENDED RELEASE ORAL at 08:43

## 2023-10-16 RX ADMIN — ATORVASTATIN CALCIUM 40 MG: 40 TABLET, FILM COATED ORAL at 20:31

## 2023-10-16 RX ADMIN — QUETIAPINE FUMARATE 12.5 MG: 25 TABLET ORAL at 08:42

## 2023-10-16 RX ADMIN — HYDROXYZINE HYDROCHLORIDE 10 MG: 10 TABLET, FILM COATED ORAL at 11:16

## 2023-10-16 RX ADMIN — Medication 10 ML: at 20:16

## 2023-10-16 RX ADMIN — PIRFENIDONE 267 MG: 267 TABLET, FILM COATED ORAL at 20:35

## 2023-10-16 RX ADMIN — LEVOTHYROXINE SODIUM 100 MCG: 100 TABLET ORAL at 06:20

## 2023-10-16 RX ADMIN — OSELTAMIVIR PHOSPHATE 75 MG: 75 CAPSULE ORAL at 08:43

## 2023-10-16 RX ADMIN — PREDNISONE 20 MG: 20 TABLET ORAL at 08:42

## 2023-10-16 RX ADMIN — SERTRALINE 100 MG: 100 TABLET, FILM COATED ORAL at 08:43

## 2023-10-16 RX ADMIN — LORAZEPAM 0.5 MG: 0.5 TABLET ORAL at 08:46

## 2023-10-16 RX ADMIN — MUPIROCIN: 20 OINTMENT TOPICAL at 08:44

## 2023-10-16 RX ADMIN — Medication 2 SPRAY: at 08:43

## 2023-10-16 RX ADMIN — PIRFENIDONE 267 MG: 267 TABLET, FILM COATED ORAL at 08:44

## 2023-10-16 RX ADMIN — FERROUS SULFATE TAB 325 MG (65 MG ELEMENTAL FE) 325 MG: 325 (65 FE) TAB at 08:43

## 2023-10-16 RX ADMIN — IPRATROPIUM BROMIDE AND ALBUTEROL SULFATE 1 DOSE: .5; 2.5 SOLUTION RESPIRATORY (INHALATION) at 19:07

## 2023-10-16 RX ADMIN — OSELTAMIVIR PHOSPHATE 75 MG: 75 CAPSULE ORAL at 20:31

## 2023-10-16 RX ADMIN — IPRATROPIUM BROMIDE AND ALBUTEROL SULFATE 1 DOSE: .5; 2.5 SOLUTION RESPIRATORY (INHALATION) at 08:00

## 2023-10-16 RX ADMIN — LORAZEPAM 0.5 MG: 0.5 TABLET ORAL at 00:06

## 2023-10-16 RX ADMIN — HYDROXYZINE HYDROCHLORIDE 10 MG: 10 TABLET, FILM COATED ORAL at 00:06

## 2023-10-16 ASSESSMENT — PAIN DESCRIPTION - DESCRIPTORS
DESCRIPTORS: ACHING
DESCRIPTORS: PRESSURE

## 2023-10-16 ASSESSMENT — PAIN SCALES - GENERAL
PAINLEVEL_OUTOF10: 0
PAINLEVEL_OUTOF10: 7
PAINLEVEL_OUTOF10: 4
PAINLEVEL_OUTOF10: 0
PAINLEVEL_OUTOF10: 4
PAINLEVEL_OUTOF10: 4
PAINLEVEL_OUTOF10: 0

## 2023-10-16 ASSESSMENT — PAIN DESCRIPTION - ONSET: ONSET: GRADUAL

## 2023-10-16 ASSESSMENT — PAIN DESCRIPTION - LOCATION
LOCATION: NOSE;FACE
LOCATION: NOSE;FACE
LOCATION: HEAD
LOCATION: NOSE;FACE

## 2023-10-16 ASSESSMENT — PAIN DESCRIPTION - ORIENTATION: ORIENTATION: MID

## 2023-10-16 ASSESSMENT — PAIN - FUNCTIONAL ASSESSMENT: PAIN_FUNCTIONAL_ASSESSMENT: ACTIVITIES ARE NOT PREVENTED

## 2023-10-16 ASSESSMENT — PAIN DESCRIPTION - PAIN TYPE: TYPE: ACUTE PAIN

## 2023-10-17 LAB
ANION GAP SERPL CALCULATED.3IONS-SCNC: 7 MMOL/L (ref 3–16)
BASOPHILS # BLD: 0 K/UL (ref 0–0.2)
BASOPHILS NFR BLD: 0.1 %
BUN SERPL-MCNC: 13 MG/DL (ref 7–20)
CALCIUM SERPL-MCNC: 8.7 MG/DL (ref 8.3–10.6)
CHLORIDE SERPL-SCNC: 94 MMOL/L (ref 99–110)
CO2 SERPL-SCNC: 37 MMOL/L (ref 21–32)
CREAT SERPL-MCNC: <0.5 MG/DL (ref 0.6–1.2)
DEPRECATED RDW RBC AUTO: 14.9 % (ref 12.4–15.4)
EOSINOPHIL # BLD: 0.2 K/UL (ref 0–0.6)
EOSINOPHIL NFR BLD: 2.1 %
GFR SERPLBLD CREATININE-BSD FMLA CKD-EPI: >60 ML/MIN/{1.73_M2}
GLUCOSE BLD-MCNC: 118 MG/DL (ref 70–99)
GLUCOSE BLD-MCNC: 135 MG/DL (ref 70–99)
GLUCOSE BLD-MCNC: 180 MG/DL (ref 70–99)
GLUCOSE BLD-MCNC: 263 MG/DL (ref 70–99)
GLUCOSE SERPL-MCNC: 152 MG/DL (ref 70–99)
HCT VFR BLD AUTO: 31 % (ref 36–48)
HGB BLD-MCNC: 10.3 G/DL (ref 12–16)
LYMPHOCYTES # BLD: 1.3 K/UL (ref 1–5.1)
LYMPHOCYTES NFR BLD: 13.3 %
MCH RBC QN AUTO: 27.5 PG (ref 26–34)
MCHC RBC AUTO-ENTMCNC: 33.2 G/DL (ref 31–36)
MCV RBC AUTO: 83 FL (ref 80–100)
MONOCYTES # BLD: 0.8 K/UL (ref 0–1.3)
MONOCYTES NFR BLD: 7.4 %
NEUTROPHILS # BLD: 7.8 K/UL (ref 1.7–7.7)
NEUTROPHILS NFR BLD: 77.1 %
PERFORMED ON: ABNORMAL
PLATELET # BLD AUTO: 109 K/UL (ref 135–450)
PMV BLD AUTO: 7.7 FL (ref 5–10.5)
POTASSIUM SERPL-SCNC: 4 MMOL/L (ref 3.5–5.1)
RBC # BLD AUTO: 3.74 M/UL (ref 4–5.2)
SODIUM SERPL-SCNC: 138 MMOL/L (ref 136–145)
WBC # BLD AUTO: 10.2 K/UL (ref 4–11)

## 2023-10-17 PROCEDURE — 36415 COLL VENOUS BLD VENIPUNCTURE: CPT

## 2023-10-17 PROCEDURE — 2060000000 HC ICU INTERMEDIATE R&B

## 2023-10-17 PROCEDURE — 2700000000 HC OXYGEN THERAPY PER DAY

## 2023-10-17 PROCEDURE — 2580000003 HC RX 258

## 2023-10-17 PROCEDURE — 6370000000 HC RX 637 (ALT 250 FOR IP): Performed by: INTERNAL MEDICINE

## 2023-10-17 PROCEDURE — 80048 BASIC METABOLIC PNL TOTAL CA: CPT

## 2023-10-17 PROCEDURE — 99233 SBSQ HOSP IP/OBS HIGH 50: CPT | Performed by: INTERNAL MEDICINE

## 2023-10-17 PROCEDURE — 94761 N-INVAS EAR/PLS OXIMETRY MLT: CPT

## 2023-10-17 PROCEDURE — 6370000000 HC RX 637 (ALT 250 FOR IP)

## 2023-10-17 PROCEDURE — 94640 AIRWAY INHALATION TREATMENT: CPT

## 2023-10-17 PROCEDURE — 85025 COMPLETE CBC W/AUTO DIFF WBC: CPT

## 2023-10-17 RX ORDER — LORAZEPAM 0.5 MG/1
0.5 TABLET ORAL EVERY 6 HOURS PRN
Qty: 8 TABLET | Refills: 0 | Status: SHIPPED | OUTPATIENT
Start: 2023-10-17 | End: 2023-10-19

## 2023-10-17 RX ORDER — TRAMADOL HYDROCHLORIDE 50 MG/1
50 TABLET ORAL EVERY 6 HOURS PRN
Qty: 8 TABLET | Refills: 0 | Status: SHIPPED | OUTPATIENT
Start: 2023-10-17 | End: 2023-10-19

## 2023-10-17 RX ORDER — IPRATROPIUM BROMIDE AND ALBUTEROL SULFATE 2.5; .5 MG/3ML; MG/3ML
3 SOLUTION RESPIRATORY (INHALATION) EVERY 6 HOURS
Qty: 1 EACH | Refills: 0
Start: 2023-10-17

## 2023-10-17 RX ORDER — PREDNISONE 10 MG/1
TABLET ORAL
Qty: 30 TABLET | Refills: 0
Start: 2023-10-17

## 2023-10-17 RX ADMIN — PANTOPRAZOLE SODIUM 40 MG: 40 TABLET, DELAYED RELEASE ORAL at 06:05

## 2023-10-17 RX ADMIN — PIRFENIDONE 267 MG: 267 TABLET, FILM COATED ORAL at 15:30

## 2023-10-17 RX ADMIN — DILTIAZEM HYDROCHLORIDE 240 MG: 120 CAPSULE, COATED, EXTENDED RELEASE ORAL at 08:19

## 2023-10-17 RX ADMIN — Medication 10 ML: at 20:59

## 2023-10-17 RX ADMIN — IPRATROPIUM BROMIDE AND ALBUTEROL SULFATE 1 DOSE: .5; 2.5 SOLUTION RESPIRATORY (INHALATION) at 15:22

## 2023-10-17 RX ADMIN — ATORVASTATIN CALCIUM 40 MG: 40 TABLET, FILM COATED ORAL at 20:58

## 2023-10-17 RX ADMIN — MUPIROCIN: 20 OINTMENT TOPICAL at 08:19

## 2023-10-17 RX ADMIN — OSELTAMIVIR PHOSPHATE 75 MG: 75 CAPSULE ORAL at 08:19

## 2023-10-17 RX ADMIN — LORAZEPAM 0.5 MG: 0.5 TABLET ORAL at 10:39

## 2023-10-17 RX ADMIN — PREDNISONE 20 MG: 20 TABLET ORAL at 08:19

## 2023-10-17 RX ADMIN — LEVOTHYROXINE SODIUM 100 MCG: 100 TABLET ORAL at 06:05

## 2023-10-17 RX ADMIN — Medication 10 ML: at 08:21

## 2023-10-17 RX ADMIN — PIRFENIDONE 267 MG: 267 TABLET, FILM COATED ORAL at 08:19

## 2023-10-17 RX ADMIN — QUETIAPINE FUMARATE 12.5 MG: 25 TABLET ORAL at 20:58

## 2023-10-17 RX ADMIN — Medication 2 SPRAY: at 08:20

## 2023-10-17 RX ADMIN — NADOLOL 20 MG: 40 TABLET ORAL at 08:26

## 2023-10-17 RX ADMIN — ACETAMINOPHEN 650 MG: 325 TABLET ORAL at 22:24

## 2023-10-17 RX ADMIN — INSULIN LISPRO 4 UNITS: 100 INJECTION, SOLUTION INTRAVENOUS; SUBCUTANEOUS at 17:27

## 2023-10-17 RX ADMIN — PIRFENIDONE 267 MG: 267 TABLET, FILM COATED ORAL at 20:59

## 2023-10-17 RX ADMIN — SERTRALINE 100 MG: 100 TABLET, FILM COATED ORAL at 08:19

## 2023-10-17 RX ADMIN — HYDROXYZINE HYDROCHLORIDE 10 MG: 10 TABLET, FILM COATED ORAL at 20:59

## 2023-10-17 RX ADMIN — IPRATROPIUM BROMIDE AND ALBUTEROL SULFATE 1 DOSE: .5; 2.5 SOLUTION RESPIRATORY (INHALATION) at 19:14

## 2023-10-17 RX ADMIN — QUETIAPINE FUMARATE 12.5 MG: 25 TABLET ORAL at 08:18

## 2023-10-17 RX ADMIN — IPRATROPIUM BROMIDE AND ALBUTEROL SULFATE 1 DOSE: .5; 2.5 SOLUTION RESPIRATORY (INHALATION) at 08:06

## 2023-10-17 RX ADMIN — MIRTAZAPINE 15 MG: 15 TABLET, FILM COATED ORAL at 20:59

## 2023-10-17 RX ADMIN — MUPIROCIN: 20 OINTMENT TOPICAL at 20:59

## 2023-10-17 RX ADMIN — FERROUS SULFATE TAB 325 MG (65 MG ELEMENTAL FE) 325 MG: 325 (65 FE) TAB at 08:19

## 2023-10-17 ASSESSMENT — PAIN SCALES - GENERAL
PAINLEVEL_OUTOF10: 0
PAINLEVEL_OUTOF10: 3
PAINLEVEL_OUTOF10: 0

## 2023-10-17 ASSESSMENT — PAIN DESCRIPTION - DESCRIPTORS: DESCRIPTORS: ACHING

## 2023-10-17 ASSESSMENT — PAIN DESCRIPTION - LOCATION: LOCATION: HEAD

## 2023-10-17 ASSESSMENT — PAIN - FUNCTIONAL ASSESSMENT: PAIN_FUNCTIONAL_ASSESSMENT: ACTIVITIES ARE NOT PREVENTED

## 2023-10-17 ASSESSMENT — PAIN DESCRIPTION - PAIN TYPE: TYPE: ACUTE PAIN

## 2023-10-17 ASSESSMENT — PAIN DESCRIPTION - FREQUENCY: FREQUENCY: CONTINUOUS

## 2023-10-17 ASSESSMENT — PAIN DESCRIPTION - ONSET: ONSET: ON-GOING

## 2023-10-17 NOTE — DISCHARGE SUMMARY
inhaler  Commonly known as: PROVENTIL;VENTOLIN;PROAIR     benzonatate 200 MG capsule  Commonly known as: TESSALON  TAKE 1 CAPSULE BY MOUTH 3 TIMES A DAY AS NEEDED FOR COUGH     dilTIAZem 240 MG extended release capsule  Commonly known as: CARDIZEM CD  TAKE 1 CAPSULE BY MOUTH EVERY DAY     ferrous sulfate 325 (65 Fe) MG tablet  Commonly known as: IRON 325  Take 1 tablet by mouth daily (with breakfast)     furosemide 40 MG tablet  Commonly known as: LASIX     guaiFENesin 600 MG extended release tablet  Commonly known as: MUCINEX     hydrOXYzine HCl 10 MG tablet  Commonly known as: ATARAX  Take 1 tablet by mouth 3 times daily as needed for Itching     ipratropium 0.5 mg-albuterol 2.5 mg 0.5-2.5 (3) MG/3ML Soln nebulizer solution  Commonly known as: DUONEB  Inhale 3 mLs into the lungs in the morning and 3 mLs at noon and 3 mLs in the evening and 3 mLs before bedtime. levothyroxine 100 MCG tablet  Commonly known as: SYNTHROID  Take 1 tablet by mouth Daily     LORazepam 0.5 MG tablet  Commonly known as: ATIVAN  Take 1 tablet by mouth every 6 hours as needed for Anxiety for up to 2 days.  Max Daily Amount: 2 mg     mirtazapine 30 MG tablet  Commonly known as: REMERON  Take 1 tablet by mouth nightly     nadolol 20 MG tablet  Commonly known as: CORGARD  Take 1 tablet by mouth daily     omeprazole 40 MG delayed release capsule  Commonly known as: PRILOSEC  TAKE 1 CAPSULE BY MOUTH EVERY DAY IN THE MORNING BEFORE BREAKFAST     ondansetron 4 MG tablet  Commonly known as: ZOFRAN  Take 1 tablet by mouth 3 times daily as needed for Nausea or Vomiting     potassium chloride 20 MEQ extended release tablet  Commonly known as: KLOR-CON M  Take 1 tablet by mouth daily     QUEtiapine 25 MG tablet  Commonly known as: SEROQUEL  Take 0.5 tablets by mouth 2 times daily     sertraline 100 MG tablet  Commonly known as: ZOLOFT  TAKE 2 TABLETS EVERY DAY     traMADol 50 MG tablet  Commonly known as: ULTRAM  Take 1 tablet by mouth every 6 hours

## 2023-10-17 NOTE — CONSULTS
Palliative Care Initial Note  Palliative Care Admit date:  10/17/23    ACP/palcare referral noted
Pulmonary & Critical Care Consultation Note    Patient is being seen at the request of Dr. Maria G Trevino  for a consultation for ILD, flu     HISTORY OF PRESENT ILLNESS: This is a 73-year-old female who has progressive severe ILD and refractory hypoxemia who was recently discharged 10/5/2023 from this hospital after COVID infection and who represented to the emergency department with a 1 day history of worsening shortness of breath and hypoxemia, initially placed on a nonrebreather, subsequently weaned to 8 L and found to have influenza A. She was recently started on Levaquin in the nursing home    PAST MEDICAL HISTORY:  Past Medical History:   Diagnosis Date    A-fib [facility])     Acute cystitis without hematuria     Anxiety     CHF (congestive heart failure) (HCC)     COPD (chronic obstructive pulmonary disease) ([address])     Cryptogenic organizing pneumonia ([address])     Depression     Diabetes mellitus ([address])     GERD (gastroesophageal reflux disease)     Hyperlipidemia     Hypertension     ILD (interstitial lung disease) ([address])     On home oxygen therapy     uses O2 3L prn    Rash     Thyroid disease     hypothyroidism     PAST SURGICAL HISTORY:  Past Surgical History:   Procedure Laterality Date    ARM SURGERY Left 3/2/2020    LEFT ULNAR NERVE DECOMPRESSION AT THE ELBOW performed by Malgorzata Brown MD at [address] [facility] [address] N/A 6/27/2019    EBUS WF W/ANES.  performed by Malia Francois MD at [address] [place] [address]  6/27/2019    BRONCHOSCOPY/TRANSBRONCHIAL NEEDLE BIOPSY performed by Malia Francois MD at [address] [place] [address]  6/27/2019    BRONCHOSCOPY/TRANSBRONCHIAL LUNG BIOPSY performed by Malia Francois MD at [address] [place] [address]  6/27/2019    BRONCHOSCOPY ALVEOLAR LAVAGE performed by Malia Francois MD at [address] [place] [address]  07/10/2019    BRONCHOSCOPY N/A 7/10/2019    BRONCHOSCOPY ALVEOLAR LAVAGE performed by Jonnie Dubon MD at [address] [place] [address]
DAY 9/8/23   Yosef Gomez MD   Pirfenidone 267 MG CAPS Take 1 capsule by mouth in the morning, at noon, and at bedtime    ProviderLin MD   albuterol sulfate HFA (PROVENTIL;VENTOLIN;PROAIR) 108 (90 Base) MCG/ACT inhaler Inhale 2 puffs into the lungs every 4 hours as needed for Wheezing    Lin Marsh MD   atorvastatin (LIPITOR) 40 MG tablet TAKE 1 TABLET EVERY DAY  Patient taking differently: Take 1 tablet by mouth at bedtime 8/2/23   Noam Horton MD   ondansetron [facility]) 4 MG tablet Take 1 tablet by mouth 3 times daily as needed for Nausea or Vomiting 8/2/23   Noam Horton MD   dilTIAZem (CARDIZEM CD) 240 MG extended release capsule TAKE 1 CAPSULE BY MOUTH EVERY DAY 7/25/23   Noam Horton MD   omeprazole (PRILOSEC) 40 MG delayed release capsule TAKE 1 CAPSULE BY MOUTH EVERY DAY IN THE MORNING BEFORE BREAKFAST 7/25/23   Noam Horton MD   nadolol (CORGARD) 20 MG tablet Take 1 tablet by mouth daily 6/13/23   Noam Horton MD   levothyroxine (SYNTHROID) 100 MCG tablet Take 1 tablet by mouth Daily 6/11/23   Azul Apodaca MD   ferrous sulfate (IRON 325) 325 (65 Fe) MG tablet Take 1 tablet by mouth daily (with breakfast) 6/10/23   Azul Apodaca MD   hydrOXYzine HCl (ATARAX) 10 MG tablet Take 1 tablet by mouth 3 times daily as needed for Itching 4/26/23   Noam Horton MD   sertraline (ZOLOFT) 100 MG tablet TAKE 2 TABLETS EVERY DAY 4/26/23   Noam Horton MD   mirtazapine (REMERON) 30 MG tablet Take 1 tablet by mouth nightly 4/26/23   Noam Horton MD   furosemide (LASIX) 40 MG tablet Take 1 tablet by mouth as needed (for edema)    Lin Marsh MD   potassium chloride (KLOR-CON M) 20 MEQ extended release tablet Take 1 tablet by mouth daily 2/22/23   Azul Apodaca MD   guaiFENesin (MUCINEX) 600 MG extended release tablet Take 1 tablet by mouth as needed for Congestion    Lin Marsh MD   benzonatate (TESSALON) 200 MG capsule TAKE

## 2023-10-17 NOTE — FLOWSHEET NOTE
10/17/23 1042   Vitals   Pulse (!) 104   Respirations 25   Oxygen Therapy   SpO2 100 %   O2 Flow Rate (L/min) 5 L/min  (plus non rebreather)     Pt appears SOB, using accessory muscle to breathe, Sats 100% pt on 5 L NC but also using non rebreather in addition to supplemental oxygen. Pt encouraged to just use one o2 delivery system so that staff can assess the extent of O2 needs. Pt became very tearful, stating that she can't do without both.

## 2023-10-17 NOTE — ACP (ADVANCE CARE PLANNING)
Advance Care Planning     General Advance Care Planning (ACP) Conversation    Date of Conversation: 10/12/2023  Conducted with: Patient with Decision Making Capacity   Healthcare Decision Maker: Next of Kin by law (only applies in absence of above) (name)      Healthcare Decision Maker:    Primary Decision Maker: Ángel Alexander  [phone]    Secondary Decision Maker: Serina Barnes  [phone]  Click here to complete Healthcare Decision Makers including selection of the Healthcare Decision Maker Relationship (ie \"Primary\"). Did not have discussion with the patient regarding POA today. The patient has a spouse who can speak for her as legal next of kin. Content/Action Overview:  Did not have conversation with patient today. Reviewed DNR/DNI and patient confirms current DNR status - completed forms on file (place new order if needed)    Spoke with the patient's daughter today. Did not speak to the patient about code status.      Length of Voluntary ACP Conversation in minutes:  <16 minutes (Non-Billable)    Claudette Coats, RN
and then pts son and dtr alternate spending the night to be w/ Adelina. Royn said his FMLA \"ran out\" but he has re-applied and is awaiting word as to whether or not he'll get more fmla time? Plan:  Continue efforts to reach [place] to discussion re: [address] [place]. If unable to reach [place] during this admission, will f/u w/ her after d/c for ACP. Update rec'd from Shift RN    Reason for consult:  _X_ Advance Care Planning  ___ Transition of Care Planning  _X_ Psychosocial/Spiritual Support  ___ Symptom Management      Care Preferences    Ventilation: \"If you were in your present state of health and suddenly became very ill and were unable to breathe on your own, what would your preference be about the use of a ventilator (breathing machine) if it were available to you? \"    Would the patient desire the use of ventilator (breathing machine)?:  No    \"If your health worsens and it becomes clear that your chance of recovery is unlikely, what would your preference be about the use of a ventilator (breathing machine) if it were available to you? \"   Would the patient desire the use of ventilator (breathing machine)?:  No    Resuscitation  \"CPR works best to restart the heart when there is a sudden event, like a heart attack, in someone who is otherwise healthy. Unfortunately, CPR does not typically restart the heart for people who have serious health conditions or who are very sick. \"    \"In the event your heart stopped as a result of an underlying serious health condition, would you want attempts to be made to restart your heart (answer \"yes\" for attempt to resuscitate) or would you prefer a natural death (answer \"no\" for do not attempt to resuscitate)? \"   no    [x] Yes   [] No   Educated Patient / [facility] regarding differences between Advance Directives and portable DNR orders.     Length of ACP Conversation in minutes:  60    Conversation Outcomes:  ACP discussion w/ pt attempted but could not reach; will cont

## 2023-10-18 VITALS
WEIGHT: 111.11 LBS | RESPIRATION RATE: 26 BRPM | OXYGEN SATURATION: 94 % | SYSTOLIC BLOOD PRESSURE: 110 MMHG | HEIGHT: 63 IN | DIASTOLIC BLOOD PRESSURE: 80 MMHG | HEART RATE: 74 BPM | BODY MASS INDEX: 19.69 KG/M2 | TEMPERATURE: 97.4 F

## 2023-10-18 LAB
ANION GAP SERPL CALCULATED.3IONS-SCNC: 7 MMOL/L (ref 3–16)
BASOPHILS # BLD: 0 K/UL (ref 0–0.2)
BASOPHILS NFR BLD: 0.3 %
BUN SERPL-MCNC: 15 MG/DL (ref 7–20)
CALCIUM SERPL-MCNC: 8.8 MG/DL (ref 8.3–10.6)
CHLORIDE SERPL-SCNC: 98 MMOL/L (ref 99–110)
CO2 SERPL-SCNC: 35 MMOL/L (ref 21–32)
CREAT SERPL-MCNC: <0.5 MG/DL (ref 0.6–1.2)
DEPRECATED RDW RBC AUTO: 15.4 % (ref 12.4–15.4)
EOSINOPHIL # BLD: 0.3 K/UL (ref 0–0.6)
EOSINOPHIL NFR BLD: 3 %
GFR SERPLBLD CREATININE-BSD FMLA CKD-EPI: >60 ML/MIN/{1.73_M2}
GLUCOSE BLD-MCNC: 114 MG/DL (ref 70–99)
GLUCOSE BLD-MCNC: 259 MG/DL (ref 70–99)
GLUCOSE SERPL-MCNC: 149 MG/DL (ref 70–99)
HCT VFR BLD AUTO: 32 % (ref 36–48)
HGB BLD-MCNC: 10.5 G/DL (ref 12–16)
LYMPHOCYTES # BLD: 2.2 K/UL (ref 1–5.1)
LYMPHOCYTES NFR BLD: 20.7 %
MCH RBC QN AUTO: 27.4 PG (ref 26–34)
MCHC RBC AUTO-ENTMCNC: 32.9 G/DL (ref 31–36)
MCV RBC AUTO: 83.2 FL (ref 80–100)
MONOCYTES # BLD: 0.8 K/UL (ref 0–1.3)
MONOCYTES NFR BLD: 7.2 %
NEUTROPHILS # BLD: 7.3 K/UL (ref 1.7–7.7)
NEUTROPHILS NFR BLD: 68.8 %
PERFORMED ON: ABNORMAL
PERFORMED ON: ABNORMAL
PLATELET # BLD AUTO: 146 K/UL (ref 135–450)
PMV BLD AUTO: 7.6 FL (ref 5–10.5)
POTASSIUM SERPL-SCNC: 3.8 MMOL/L (ref 3.5–5.1)
RBC # BLD AUTO: 3.85 M/UL (ref 4–5.2)
SODIUM SERPL-SCNC: 140 MMOL/L (ref 136–145)
WBC # BLD AUTO: 10.6 K/UL (ref 4–11)

## 2023-10-18 PROCEDURE — 99232 SBSQ HOSP IP/OBS MODERATE 35: CPT | Performed by: OTOLARYNGOLOGY

## 2023-10-18 PROCEDURE — 2580000003 HC RX 258

## 2023-10-18 PROCEDURE — 36592 COLLECT BLOOD FROM PICC: CPT

## 2023-10-18 PROCEDURE — 2700000000 HC OXYGEN THERAPY PER DAY

## 2023-10-18 PROCEDURE — 6370000000 HC RX 637 (ALT 250 FOR IP)

## 2023-10-18 PROCEDURE — 6370000000 HC RX 637 (ALT 250 FOR IP): Performed by: INTERNAL MEDICINE

## 2023-10-18 PROCEDURE — 94761 N-INVAS EAR/PLS OXIMETRY MLT: CPT

## 2023-10-18 PROCEDURE — 94640 AIRWAY INHALATION TREATMENT: CPT

## 2023-10-18 PROCEDURE — 85025 COMPLETE CBC W/AUTO DIFF WBC: CPT

## 2023-10-18 PROCEDURE — 80048 BASIC METABOLIC PNL TOTAL CA: CPT

## 2023-10-18 PROCEDURE — 99233 SBSQ HOSP IP/OBS HIGH 50: CPT | Performed by: INTERNAL MEDICINE

## 2023-10-18 RX ORDER — IPRATROPIUM BROMIDE AND ALBUTEROL SULFATE 2.5; .5 MG/3ML; MG/3ML
1 SOLUTION RESPIRATORY (INHALATION)
Status: DISCONTINUED | OUTPATIENT
Start: 2023-10-18 | End: 2023-10-18 | Stop reason: HOSPADM

## 2023-10-18 RX ADMIN — QUETIAPINE FUMARATE 12.5 MG: 25 TABLET ORAL at 08:06

## 2023-10-18 RX ADMIN — LORAZEPAM 0.5 MG: 0.5 TABLET ORAL at 11:14

## 2023-10-18 RX ADMIN — DILTIAZEM HYDROCHLORIDE 240 MG: 120 CAPSULE, COATED, EXTENDED RELEASE ORAL at 08:06

## 2023-10-18 RX ADMIN — FERROUS SULFATE TAB 325 MG (65 MG ELEMENTAL FE) 325 MG: 325 (65 FE) TAB at 08:06

## 2023-10-18 RX ADMIN — PIRFENIDONE 267 MG: 267 TABLET, FILM COATED ORAL at 14:27

## 2023-10-18 RX ADMIN — INSULIN LISPRO 4 UNITS: 100 INJECTION, SOLUTION INTRAVENOUS; SUBCUTANEOUS at 12:09

## 2023-10-18 RX ADMIN — PANTOPRAZOLE SODIUM 40 MG: 40 TABLET, DELAYED RELEASE ORAL at 06:04

## 2023-10-18 RX ADMIN — PIRFENIDONE 267 MG: 267 TABLET, FILM COATED ORAL at 08:07

## 2023-10-18 RX ADMIN — LORAZEPAM 0.5 MG: 0.5 TABLET ORAL at 00:04

## 2023-10-18 RX ADMIN — NADOLOL 20 MG: 40 TABLET ORAL at 08:34

## 2023-10-18 RX ADMIN — IPRATROPIUM BROMIDE AND ALBUTEROL SULFATE 1 DOSE: .5; 2.5 SOLUTION RESPIRATORY (INHALATION) at 07:31

## 2023-10-18 RX ADMIN — SERTRALINE 100 MG: 100 TABLET, FILM COATED ORAL at 08:06

## 2023-10-18 RX ADMIN — LEVOTHYROXINE SODIUM 100 MCG: 100 TABLET ORAL at 06:04

## 2023-10-18 RX ADMIN — Medication 10 ML: at 08:07

## 2023-10-18 RX ADMIN — PREDNISONE 20 MG: 20 TABLET ORAL at 08:06

## 2023-10-18 RX ADMIN — HYDROXYZINE HYDROCHLORIDE 10 MG: 10 TABLET, FILM COATED ORAL at 14:27

## 2023-10-18 ASSESSMENT — PAIN SCALES - GENERAL
PAINLEVEL_OUTOF10: 0

## 2023-10-18 NOTE — PLAN OF CARE
Problem: Cardiovascular - Adult  Goal: Maintains optimal cardiac output and hemodynamic stability  Outcome: Progressing  Flowsheets (Taken 10/15/2023 2050)  Maintains optimal cardiac output and hemodynamic stability: Monitor blood pressure and heart rate    HEART FAILURE CARE PLAN:    Comorbidities Reviewed: Yes   Patient has a past medical history of A-fib ([address]), Acute cystitis without hematuria, Anxiety, CHF (congestive heart failure) ([address]), COPD (chronic obstructive pulmonary disease) ([address]), Cryptogenic organizing pneumonia ([address]), Depression, Diabetes mellitus ([address]), GERD (gastroesophageal reflux disease), Hyperlipidemia, Hypertension, ILD (interstitial lung disease) ([address]), On home oxygen therapy, Rash, and Thyroid disease. ECHOCARDIOGRAM Reviewed: Yes   Patient's Ejection Fraction (EF) is greater than 40%    Weights Reviewed:  Yes   Admission weight: 123 lb 10.9 oz (56.1 kg)   Wt Readings from Last 3 Encounters:   10/15/23 123 lb 10.9 oz (56.1 kg)   10/05/23 94 lb 3.2 oz (42.7 kg)   09/24/23 124 lb (56.2 kg)     Intake & Output Reviewed: Yes     Intake/Output Summary (Last 24 hours) at 10/15/2023 2230  Last data filed at 10/15/2023 1920  Gross per 24 hour   Intake 1070 ml   Output 2050 ml   Net -980 ml     Medications Reviewed: Yes   SCHEDULED HOSPITAL MEDICATIONS:   predniSONE  30 mg Oral Daily    mirtazapine  15 mg Oral Nightly    oxymetazoline  2 spray Each Nostril BID    mupirocin   Each Nostril BID    Pirfenidone  267 mg Oral TID    insulin lispro  0-8 Units SubCUTAneous TID     insulin lispro  0-4 Units SubCUTAneous Nightly    sodium chloride flush  5-40 mL IntraVENous 2 times per day    [Held by provider] enoxaparin  40 mg SubCUTAneous Daily    oseltamivir  75 mg Oral BID    atorvastatin  40 mg Oral Nightly    dilTIAZem  240 mg Oral Daily    ferrous sulfate  325 mg Oral Daily with breakfast    levothyroxine  100 mcg Oral Daily    nadolol  20 mg Oral Daily    pantoprazole  40 mg Oral QAM AC
Problem: Discharge Planning  Goal: Discharge to home or other facility with appropriate resources  10/14/2023 2019 by Alexa Alejandro RN  Outcome: Progressing  Flowsheets (Taken 10/14/2023 2000)  Discharge to home or other facility with appropriate resources: Identify barriers to discharge with patient and caregiver  10/14/2023 0921 by Vasiliy Espino RN  Outcome: Progressing     Problem: Safety - Adult  Goal: Free from fall injury  10/14/2023 2019 by Alexa Alejandro RN  Outcome: Progressing  10/14/2023 0921 by Vasiliy Espino RN  Outcome: Progressing     Problem: Pain  Goal: Verbalizes/displays adequate comfort level or baseline comfort level  10/14/2023 2019 by Alexa Alejandro RN  Outcome: Progressing  10/14/2023 0921 by Vasiliy Espino RN  Outcome: Progressing  Flowsheets (Taken 10/13/2023 2000 by Alexa Alejandro RN)  Verbalizes/displays adequate comfort level or baseline comfort level: Encourage patient to monitor pain and request assistance     Problem: Skin/Tissue Integrity  Goal: Absence of new skin breakdown  Description: 1. Monitor for areas of redness and/or skin breakdown  2. Assess vascular access sites hourly  3. Every 4-6 hours minimum:  Change oxygen saturation probe site  4. Every 4-6 hours:  If on nasal continuous positive airway pressure, respiratory therapy assess nares and determine need for appliance change or resting period.   Outcome: Progressing     Problem: ABCDS Injury Assessment  Goal: Absence of physical injury  10/14/2023 2019 by Alexa Alejandro RN  Outcome: Progressing  10/14/2023 0921 by Vasiliy Espino RN  Outcome: Progressing     Problem: Chronic Conditions and Co-morbidities  Goal: Patient's chronic conditions and co-morbidity symptoms are monitored and maintained or improved  10/14/2023 2019 by Alexa Alejandro RN  Outcome: Progressing  Flowsheets (Taken 10/14/2023 2000)  Care Plan - Patient's Chronic Conditions and Co-Morbidity Symptoms are Monitored and
Problem: Discharge Planning  Goal: Discharge to home or other facility with appropriate resources  10/15/2023 2130 by Corinne Davis RN  Outcome: Progressing  Flowsheets (Taken 10/15/2023 2050)  Discharge to home or other facility with appropriate resources: Identify barriers to discharge with patient and caregiver  10/15/2023 1857 by Abimael Rodriguez RN  Outcome: Progressing     Problem: Safety - Adult  Goal: Free from fall injury  10/15/2023 2130 by Corinne Davis RN  Outcome: Progressing  10/15/2023 1857 by Abimael Rodriguez RN  Outcome: Progressing     Problem: Pain  Goal: Verbalizes/displays adequate comfort level or baseline comfort level  10/15/2023 2130 by Corinne Davis RN  Outcome: Progressing  Flowsheets (Taken 10/15/2023 2000)  Verbalizes/displays adequate comfort level or baseline comfort level: Encourage patient to monitor pain and request assistance  10/15/2023 1857 by Abimael Rodriguez RN  Outcome: Progressing     Problem: Skin/Tissue Integrity  Goal: Absence of new skin breakdown  Description: 1. Monitor for areas of redness and/or skin breakdown  2. Assess vascular access sites hourly  3. Every 4-6 hours minimum:  Change oxygen saturation probe site  4. Every 4-6 hours:  If on nasal continuous positive airway pressure, respiratory therapy assess nares and determine need for appliance change or resting period.   10/15/2023 2130 by Corinne Davis RN  Outcome: Progressing  10/15/2023 1857 by Abimael Rodriguez RN  Outcome: Progressing     Problem: ABCDS Injury Assessment  Goal: Absence of physical injury  10/15/2023 2130 by Corinne Davis RN  Outcome: Progressing  10/15/2023 1857 by Abimael Rodriguez RN  Outcome: Progressing     Problem: Chronic Conditions and Co-morbidities  Goal: Patient's chronic conditions and co-morbidity symptoms are monitored and maintained or improved  10/15/2023 2130 by Corinne Davis RN  Outcome: Progressing  Flowsheets (Taken 10/15/2023 2050)  Care Plan - Patient's Chronic
Problem: Discharge Planning  Goal: Discharge to home or other facility with appropriate resources  10/18/2023 0925 by Jenifer Gonzalez RN  Outcome: Progressing  Flowsheets (Taken 10/18/2023 0800)  Discharge to home or other facility with appropriate resources:   Identify barriers to discharge with patient and caregiver   Refer to discharge planning if patient needs post-hospital services based on physician order or complex needs related to functional status, cognitive ability or social support system   Arrange for needed discharge resources and transportation as appropriate  10/18/2023 0038 by Sade Cross RN  Outcome: Progressing     Problem: Safety - Adult  Goal: Free from fall injury  10/18/2023 0925 by Jenifer Gonzalez RN  Outcome: Progressing  10/18/2023 0038 by Sade Cross RN  Outcome: Progressing     Problem: Pain  Goal: Verbalizes/displays adequate comfort level or baseline comfort level  10/18/2023 0925 by Jenifer Gonzalez RN  Outcome: Progressing  10/18/2023 0038 by Sade Cross RN  Outcome: Progressing     Problem: Skin/Tissue Integrity  Goal: Absence of new skin breakdown  Description: 1. Monitor for areas of redness and/or skin breakdown  2. Assess vascular access sites hourly  3. Every 4-6 hours minimum:  Change oxygen saturation probe site  4. Every 4-6 hours:  If on nasal continuous positive airway pressure, respiratory therapy assess nares and determine need for appliance change or resting period.   10/18/2023 0925 by Jenifer Gonzalez RN  Outcome: Progressing  10/18/2023 0038 by Sade Cross RN  Outcome: Progressing     Problem: ABCDS Injury Assessment  Goal: Absence of physical injury  10/18/2023 0925 by Jenifer Gonzalez RN  Outcome: Progressing  10/18/2023 0038 by Sade Cross RN  Outcome: Progressing     Problem: Chronic Conditions and Co-morbidities  Goal: Patient's chronic conditions and co-morbidity symptoms are monitored and maintained or improved  10/18/2023 0925 by Jenifer Gonzalez
Problem: Discharge Planning  Goal: Discharge to home or other facility with appropriate resources  10/18/2023 1255 by Renee Lyles RN  Outcome: Adequate for Discharge  10/18/2023 0925 by Renee Lyles RN  Outcome: Progressing  Flowsheets (Taken 10/18/2023 0800)  Discharge to home or other facility with appropriate resources:   Identify barriers to discharge with patient and caregiver   Refer to discharge planning if patient needs post-hospital services based on physician order or complex needs related to functional status, cognitive ability or social support system   Arrange for needed discharge resources and transportation as appropriate  10/18/2023 0038 by Zach Gay RN  Outcome: Progressing     Problem: Safety - Adult  Goal: Free from fall injury  10/18/2023 1255 by Renee Lyles RN  Outcome: Adequate for Discharge  10/18/2023 0925 by Renee Lyles RN  Outcome: Progressing  10/18/2023 0038 by Zach Gay RN  Outcome: Progressing     Problem: Pain  Goal: Verbalizes/displays adequate comfort level or baseline comfort level  10/18/2023 1255 by Renee Lyles RN  Outcome: Adequate for Discharge  10/18/2023 0925 by Renee Lyles RN  Outcome: Progressing  10/18/2023 0038 by Zach Gay RN  Outcome: Progressing     Problem: Skin/Tissue Integrity  Goal: Absence of new skin breakdown  Description: 1. Monitor for areas of redness and/or skin breakdown  2. Assess vascular access sites hourly  3. Every 4-6 hours minimum:  Change oxygen saturation probe site  4. Every 4-6 hours:  If on nasal continuous positive airway pressure, respiratory therapy assess nares and determine need for appliance change or resting period.   10/18/2023 1255 by Renee Lyles RN  Outcome: Adequate for Discharge  10/18/2023 0925 by Renee Lyles RN  Outcome: Progressing  10/18/2023 0038 by Zach Gay RN  Outcome: Progressing     Problem: ABCDS Injury Assessment  Goal: Absence of physical injury  10/18/2023 1255 by Zeyad Kirkland
Problem: Discharge Planning  Goal: Discharge to home or other facility with appropriate resources  Outcome: Progressing     Problem: Safety - Adult  Goal: Free from fall injury  Outcome: Progressing     Problem: Pain  Goal: Verbalizes/displays adequate comfort level or baseline comfort level  Outcome: Progressing     Problem: Skin/Tissue Integrity  Goal: Absence of new skin breakdown  Description: 1. Monitor for areas of redness and/or skin breakdown  2. Assess vascular access sites hourly  3. Every 4-6 hours minimum:  Change oxygen saturation probe site  4. Every 4-6 hours:  If on nasal continuous positive airway pressure, respiratory therapy assess nares and determine need for appliance change or resting period.   Outcome: Progressing     Problem: ABCDS Injury Assessment  Goal: Absence of physical injury  Outcome: Progressing
Problem: Discharge Planning  Goal: Discharge to home or other facility with appropriate resources  Outcome: Progressing     Problem: Safety - Adult  Goal: Free from fall injury  Outcome: Progressing     Problem: Pain  Goal: Verbalizes/displays adequate comfort level or baseline comfort level  Outcome: Progressing     Problem: Skin/Tissue Integrity  Goal: Absence of new skin breakdown  Description: 1. Monitor for areas of redness and/or skin breakdown  2. Assess vascular access sites hourly  3. Every 4-6 hours minimum:  Change oxygen saturation probe site  4. Every 4-6 hours:  If on nasal continuous positive airway pressure, respiratory therapy assess nares and determine need for appliance change or resting period.   Outcome: Progressing     Problem: ABCDS Injury Assessment  Goal: Absence of physical injury  Outcome: Progressing     Problem: Chronic Conditions and Co-morbidities  Goal: Patient's chronic conditions and co-morbidity symptoms are monitored and maintained or improved  Outcome: Progressing     Problem: Respiratory - Adult  Goal: Achieves optimal ventilation and oxygenation  Outcome: Progressing
Problem: Discharge Planning  Goal: Discharge to home or other facility with appropriate resources  Outcome: Progressing     Problem: Safety - Adult  Goal: Free from fall injury  Outcome: Progressing     Problem: Pain  Goal: Verbalizes/displays adequate comfort level or baseline comfort level  Outcome: Progressing     Problem: Skin/Tissue Integrity  Goal: Absence of new skin breakdown  Description: 1. Monitor for areas of redness and/or skin breakdown  2. Assess vascular access sites hourly  3. Every 4-6 hours minimum:  Change oxygen saturation probe site  4. Every 4-6 hours:  If on nasal continuous positive airway pressure, respiratory therapy assess nares and determine need for appliance change or resting period.   Outcome: Progressing     Problem: ABCDS Injury Assessment  Goal: Absence of physical injury  Outcome: Progressing     Problem: Chronic Conditions and Co-morbidities  Goal: Patient's chronic conditions and co-morbidity symptoms are monitored and maintained or improved  Outcome: Progressing  Flowsheets (Taken 10/17/2023 1319 by Shaquille Chopra RN)  Care Plan - Patient's Chronic Conditions and Co-Morbidity Symptoms are Monitored and Maintained or Improved:   Monitor and assess patient's chronic conditions and comorbid symptoms for stability, deterioration, or improvement   Collaborate with multidisciplinary team to address chronic and comorbid conditions and prevent exacerbation or deterioration   Update acute care plan with appropriate goals if chronic or comorbid symptoms are exacerbated and prevent overall improvement and discharge     Problem: Respiratory - Adult  Goal: Achieves optimal ventilation and oxygenation  Outcome: Progressing     Problem: Cardiovascular - Adult  Goal: Maintains optimal cardiac output and hemodynamic stability  Outcome: Progressing  Goal: Absence of cardiac dysrhythmias or at baseline  Outcome: Progressing
Problem: Discharge Planning  Goal: Discharge to home or other facility with appropriate resources  Outcome: Progressing  Flowsheets (Taken 10/13/2023 1951)  Discharge to home or other facility with appropriate resources: Identify barriers to discharge with patient and caregiver     Problem: Safety - Adult  Goal: Free from fall injury  Outcome: Progressing     Problem: Pain  Goal: Verbalizes/displays adequate comfort level or baseline comfort level  Outcome: Progressing     Problem: Skin/Tissue Integrity  Goal: Absence of new skin breakdown  Description: 1. Monitor for areas of redness and/or skin breakdown  2. Assess vascular access sites hourly  3. Every 4-6 hours minimum:  Change oxygen saturation probe site  4. Every 4-6 hours:  If on nasal continuous positive airway pressure, respiratory therapy assess nares and determine need for appliance change or resting period.   Outcome: Progressing     Problem: ABCDS Injury Assessment  Goal: Absence of physical injury  Outcome: Progressing     Problem: Chronic Conditions and Co-morbidities  Goal: Patient's chronic conditions and co-morbidity symptoms are monitored and maintained or improved  Outcome: Progressing  Flowsheets (Taken 10/13/2023 1951)  Care Plan - Patient's Chronic Conditions and Co-Morbidity Symptoms are Monitored and Maintained or Improved: Monitor and assess patient's chronic conditions and comorbid symptoms for stability, deterioration, or improvement     Problem: Respiratory - Adult  Goal: Achieves optimal ventilation and oxygenation  Outcome: Progressing  Flowsheets (Taken 10/13/2023 1951)  Achieves optimal ventilation and oxygenation: Assess for changes in respiratory status
10/13/2023 1951)  Care Plan - Patient's Chronic Conditions and Co-Morbidity Symptoms are Monitored and Maintained or Improved: Monitor and assess patient's chronic conditions and comorbid symptoms for stability, deterioration, or improvement     Problem: Respiratory - Adult  Goal: Achieves optimal ventilation and oxygenation  10/14/2023 0921 by Kristin Knox RN  Outcome: Progressing  10/13/2023 1954 by Tamiko Langley RN  Outcome: Progressing  Flowsheets (Taken 10/13/2023 1951)  Achieves optimal ventilation and oxygenation: Assess for changes in respiratory status
cardiac dysrhythmias or at baseline  10/17/2023 1011 by Bonner Severance, RN  Outcome: Progressing  10/16/2023 2149 by Rafal Morrow RN  Outcome: Progressing  Flowsheets (Taken 10/16/2023 2014)  Absence of cardiac dysrhythmias or at baseline: Monitor cardiac rate and rhythm     Problem: Chronic Conditions and Co-morbidities  Goal: Patient's chronic conditions and co-morbidity symptoms are monitored and maintained or improved  10/17/2023 1011 by Bonner Severance, RN  Outcome: Progressing  Flowsheets (Taken 10/17/2023 0806)  Care Plan - Patient's Chronic Conditions and Co-Morbidity Symptoms are Monitored and Maintained or Improved:   Monitor and assess patient's chronic conditions and comorbid symptoms for stability, deterioration, or improvement   Collaborate with multidisciplinary team to address chronic and comorbid conditions and prevent exacerbation or deterioration   Update acute care plan with appropriate goals if chronic or comorbid symptoms are exacerbated and prevent overall improvement and discharge  10/16/2023 2149 by Rafal Morrow RN  Outcome: Not Progressing  Flowsheets (Taken 10/16/2023 2014)  Care Plan - Patient's Chronic Conditions and Co-Morbidity Symptoms are Monitored and Maintained or Improved:   Monitor and assess patient's chronic conditions and comorbid symptoms for stability, deterioration, or improvement   Update acute care plan with appropriate goals if chronic or comorbid symptoms are exacerbated and prevent overall improvement and discharge     Problem: Respiratory - Adult  Goal: Achieves optimal ventilation and oxygenation  10/17/2023 1011 by Bonner Severance, RN  Outcome: Progressing  10/16/2023 2149 by Rafal Morrow RN  Outcome: Not Progressing  Flowsheets (Taken 10/16/2023 2014)  Achieves optimal ventilation and oxygenation:   Assess for changes in respiratory status   Assess for changes in mentation and behavior   Position to facilitate oxygenation and
level  10/16/2023 2149 by Rain See RN  Outcome: Progressing  Flowsheets (Taken 10/16/2023 2014)  Verbalizes/displays adequate comfort level or baseline comfort level:   Encourage patient to monitor pain and request assistance   Assess pain using appropriate pain scale   Administer analgesics based on type and severity of pain and evaluate response   Implement non-pharmacological measures as appropriate and evaluate response  10/16/2023 0958 by Darylene Oxford, RN  Outcome: Progressing  Flowsheets  Taken 10/16/2023 0403 by Lorenza Mcdermott RN  Verbalizes/displays adequate comfort level or baseline comfort level: Encourage patient to monitor pain and request assistance  Taken 10/16/2023 0000 by Lorenza Mcdermott RN  Verbalizes/displays adequate comfort level or baseline comfort level: Encourage patient to monitor pain and request assistance     Problem: Skin/Tissue Integrity  Goal: Absence of new skin breakdown  Description: 1. Monitor for areas of redness and/or skin breakdown  2. Assess vascular access sites hourly  3. Every 4-6 hours minimum:  Change oxygen saturation probe site  4. Every 4-6 hours:  If on nasal continuous positive airway pressure, respiratory therapy assess nares and determine need for appliance change or resting period.   10/16/2023 2149 by Rain See RN  Outcome: Progressing  10/16/2023 0958 by Darylene Oxford, RN  Outcome: Progressing     Problem: ABCDS Injury Assessment  Goal: Absence of physical injury  10/16/2023 2149 by Rain See RN  Outcome: Progressing  10/16/2023 0958 by Darylene Oxford, RN  Outcome: Progressing     Problem: Cardiovascular - Adult  Goal: Absence of cardiac dysrhythmias or at baseline  10/16/2023 2149 by Rain See RN  Outcome: Progressing  Flowsheets (Taken 10/16/2023 2014)  Absence of cardiac dysrhythmias or at baseline: Monitor cardiac rate and rhythm  10/16/2023 0958 by Darylene Oxford, RN  Outcome:
saturation or arterial blood gases   Encourage broncho-pulmonary hygiene including cough, deep breathe, incentive spirometry   Assess the need for suctioning and aspirate as needed   Assess and instruct to report shortness of breath or any respiratory difficulty   Respiratory therapy support as indicated     Problem: Cardiovascular - Adult  Goal: Maintains optimal cardiac output and hemodynamic stability  10/16/2023 0958 by Smitha Ramirez RN  Outcome: Progressing  10/15/2023 2230 by Maria Isabel Olivarez RN  Outcome: Progressing  Flowsheets (Taken 10/15/2023 2050)  Maintains optimal cardiac output and hemodynamic stability: Monitor blood pressure and heart rate     Problem: Cardiovascular - Adult  Goal: Absence of cardiac dysrhythmias or at baseline  Outcome: Progressing  Flowsheets (Taken 10/15/2023 2050 by Maria Isabel Olivarez RN)  Absence of cardiac dysrhythmias or at baseline: Monitor cardiac rate and rhythm

## 2023-10-19 NOTE — CARE COORDINATION
CM delivered 2nd IMM delivered within 4 hours for DC, verbal explanation of patient rights at bedside. Pt voiced understanding of discharge MCR rights and is agreeable to discharge.
CM went to speak to the spouse of patient. The spouse was on the phone with daughter Damon Estevez and asked if Damon Estevez could speak with CM. Spoke to daughter Damon Estevez. Family agree that the patient will return to [facility] - if she can have a private room. DALLIN spoke to Angeline. A private room will be available tomorrow. Precert started by [place].
DISCHARGE ORDER  Date/Time 10/18/2023 12:48 PM  Completed by: Urszula Givens, MICK, Case Management    Patient Name: Tariq Dubon    : 1955      Admit order Date and Status: 10/12/23  Noted discharge order. (verify MD's last order for status of admission/Traditional Medicare 3 [place] Inpatient qualifying stay required for SNF)    Confirmed discharge plan with:              Patient:  No              When pt confirms DC plan does any support person need to be contacted by CM Yes if yes who - daughter Cleo Schaumann                      Discharge to Facility: [address] [facility] phone number for staff giving report: [phone]   Pre-certification completed: yes   Hospital Exemption Notification (HENS) completed: Completed within the last 30 days   Discharge orders and Continuity of Care faxed to facility:  Pulled from [address] [place] [address]. Copy sent with patient. Transportation:               Medical Transport explained with choice list offered to pt/family. Choice:No(no preference)  Agency used: [employer]   time:   14:30 - 15:00      Pt/family/Nursing/Facility aware of  time:   Yes Names: Bhakti/daughter, Gregory/AtaKirstie RN, [address] Ricky. Ambulance form completed:  yes:      Date Last IMM Given: 10/18/23    Comments: Pt is being [facility]. Pt's O2 sats are 96% on 7L. Discharge timeout done with Kirstie BARTNO. All discharge needs and concerns addressed. Discharging nurse to complete CAROLANN, reconcile AVS, and place final copy with patient's discharge packet. Discharging RN to ensure that written prescriptions for  Level II medications are sent with patient to the facility as per protocol.
INTERDISCIPLINARY PLAN OF CARE CONFERENCE    Date/Time: 10/16/2023 10:33 AM  Completed by: Rishi Elena RN, Case Management      Patient Name:  Michaela Obando  YOB: 1955  Admitting Diagnosis: Influenza A [J10.1]  Acute respiratory failure with hypoxia ([address]) [J96.01]  Acute on chronic respiratory failure ([address]) [J96.20]     Admit Date/Time:  10/12/2023  9:35 AM    Chart reviewed. Interdisciplinary team contacted or reviewed plan related to patient progress and discharge plans. Disciplines included Case Management, Nursing, and Dietitian. Current Status:10/12/23  PT/OT recommendation for discharge plan of care: Seeing patient this morning. Expected D/C Disposition:  TBD  Confirmed plan with patient and/or family Yes confirmed with: Daughter Ivan John. The family is not interested in Hospice at this time. The patient has Palliative care already in place. OT/PT is seeing the patient this morning. The family will go with OT/PT recommendation for discharge plan.    __________________________    Left VM for daughter advising that PT is recommending SNF. Also advised that the patient is being downgraded to another floor and it is important to have SNF choice so it can be submitted tomorrow morning.
AM    Patient Admission Status: Inpatient   Readmission Risk (Low < 19, Mod (19-27), High > 27): Readmission Risk Score: 40.2    Current PCP: Korin Irvin MD  PCP verified by CM? (P) Yes    Chart Reviewed: Yes      History Provided by: Child/Family  Patient Orientation: Other (see comment) (Spoke with daughter today.)    Patient Cognition:  (Spoke with daughter today.)    Hospitalization in the last 30 days (Readmission):  Yes    If yes, Readmission Assessment in  Navigator will be completed. Advance Directives:      Code Status: Limited   Patient's Primary Decision Maker is: Legal Next of Kin    Primary Decision Maker: Josh Carl  Spouse - [phone]    Secondary Decision Maker: Konstantin Foster Child - [phone]    Discharge Planning:    Patient lives with: (P) Spouse/Significant Other Type of Home: (P) House  Primary Care Giver: Spouse  Patient Support Systems include: Spouse/Significant Other, Children   Current Financial resources: (P) Medicare, Medicaid  Current community resources: (P) None  Current services prior to admission: (P) C-pap, Durable Medical Equipment, Oxygen Therapy (Kayley baseline 02 at 8L. )            Current DME: (P) Wheelchair            Type of Home Care services:  (P) None    ADLS  Prior functional level: (P) Assistance with the following:, Bathing, Cooking, Housework, Shopping, Mobility, Dressing  Current functional level: (P) Assistance with the following:, Dressing, Cooking, Housework, Shopping, Mobility    PT AM-PAC:   /24  OT AM-PAC:   /24    Family can provide assistance at DC: (P) Yes  Would you like Case Management to discuss the discharge plan with any other family members/significant others, and if so, who? (P) No  Plans to Return to Present Housing: (P) Unknown at present  Other Identified Issues/Barriers to RETURNING to current housing: May go to SNF. Family uncertain at this time.    Potential Assistance needed at discharge:              Potential DME:

## 2023-11-04 RX ORDER — NADOLOL 20 MG/1
20 TABLET ORAL DAILY
Qty: 90 TABLET | Refills: 1 | Status: SHIPPED | OUTPATIENT
Start: 2023-11-04

## 2023-11-06 ENCOUNTER — HOSPITAL ENCOUNTER (INPATIENT)
Age: 68
LOS: 3 days | Discharge: HOME | DRG: 189 | End: 2023-11-09
Attending: STUDENT IN AN ORGANIZED HEALTH CARE EDUCATION/TRAINING PROGRAM | Admitting: INTERNAL MEDICINE
Payer: MEDICARE

## 2023-11-06 ENCOUNTER — APPOINTMENT (OUTPATIENT)
Dept: GENERAL RADIOLOGY | Age: 68
DRG: 189 | End: 2023-11-06
Payer: MEDICARE

## 2023-11-06 DIAGNOSIS — J96.22 ACUTE ON CHRONIC RESPIRATORY FAILURE WITH HYPOXIA AND HYPERCAPNIA (HCC): Primary | ICD-10-CM

## 2023-11-06 DIAGNOSIS — R06.02 SOB (SHORTNESS OF BREATH): ICD-10-CM

## 2023-11-06 DIAGNOSIS — J96.21 ACUTE ON CHRONIC RESPIRATORY FAILURE WITH HYPOXIA AND HYPERCAPNIA (HCC): Primary | ICD-10-CM

## 2023-11-06 DIAGNOSIS — J96.11 CHRONIC HYPOXEMIC RESPIRATORY FAILURE (HCC): ICD-10-CM

## 2023-11-06 DIAGNOSIS — J84.9 ILD (INTERSTITIAL LUNG DISEASE) (HCC): ICD-10-CM

## 2023-11-06 DIAGNOSIS — Z51.81 THERAPEUTIC DRUG MONITORING: ICD-10-CM

## 2023-11-06 PROBLEM — J44.1 ACUTE EXACERBATION OF CHRONIC OBSTRUCTIVE PULMONARY DISEASE (COPD) (HCC): Status: ACTIVE | Noted: 2023-11-06

## 2023-11-06 LAB
ALBUMIN SERPL-MCNC: 3.7 G/DL (ref 3.4–5)
ALBUMIN/GLOB SERPL: 1.2 {RATIO} (ref 1.1–2.2)
ALP SERPL-CCNC: 102 U/L (ref 40–129)
ALT SERPL-CCNC: 20 U/L (ref 10–40)
ANION GAP SERPL CALCULATED.3IONS-SCNC: 9 MMOL/L (ref 3–16)
AST SERPL-CCNC: 18 U/L (ref 15–37)
BASE EXCESS BLDV CALC-SCNC: 5.6 MMOL/L (ref -3–3)
BASOPHILS # BLD: 0 K/UL (ref 0–0.2)
BASOPHILS NFR BLD: 0.4 %
BILIRUB SERPL-MCNC: 0.3 MG/DL (ref 0–1)
BUN SERPL-MCNC: 9 MG/DL (ref 7–20)
CALCIUM SERPL-MCNC: 9.2 MG/DL (ref 8.3–10.6)
CHLORIDE SERPL-SCNC: 97 MMOL/L (ref 99–110)
CO2 BLDV-SCNC: 34 MMOL/L
CO2 SERPL-SCNC: 30 MMOL/L (ref 21–32)
COHGB MFR BLDV: 2.2 % (ref 0–1.5)
CREAT SERPL-MCNC: <0.5 MG/DL (ref 0.6–1.2)
DEPRECATED RDW RBC AUTO: 16.6 % (ref 12.4–15.4)
EKG ATRIAL RATE: 91 BPM
EKG DIAGNOSIS: NORMAL
EKG P AXIS: -6 DEGREES
EKG P-R INTERVAL: 154 MS
EKG Q-T INTERVAL: 348 MS
EKG QRS DURATION: 78 MS
EKG QTC CALCULATION (BAZETT): 428 MS
EKG R AXIS: 30 DEGREES
EKG T AXIS: -16 DEGREES
EKG VENTRICULAR RATE: 91 BPM
EOSINOPHIL # BLD: 0.3 K/UL (ref 0–0.6)
EOSINOPHIL NFR BLD: 2.3 %
FLUAV RNA RESP QL NAA+PROBE: NOT DETECTED
FLUBV RNA RESP QL NAA+PROBE: NOT DETECTED
GFR SERPLBLD CREATININE-BSD FMLA CKD-EPI: >60 ML/MIN/{1.73_M2}
GLUCOSE SERPL-MCNC: 165 MG/DL (ref 70–99)
HCO3 BLDV-SCNC: 32.5 MMOL/L (ref 23–29)
HCT VFR BLD AUTO: 35.9 % (ref 36–48)
HGB BLD-MCNC: 11.7 G/DL (ref 12–16)
LACTATE BLDV-SCNC: 1.9 MMOL/L (ref 0.4–1.9)
LYMPHOCYTES # BLD: 0.7 K/UL (ref 1–5.1)
LYMPHOCYTES NFR BLD: 5.8 %
MCH RBC QN AUTO: 27.5 PG (ref 26–34)
MCHC RBC AUTO-ENTMCNC: 32.5 G/DL (ref 31–36)
MCV RBC AUTO: 84.8 FL (ref 80–100)
METHGB MFR BLDV: 0.3 %
MONOCYTES # BLD: 0.3 K/UL (ref 0–1.3)
MONOCYTES NFR BLD: 2.3 %
NEUTROPHILS # BLD: 10.4 K/UL (ref 1.7–7.7)
NEUTROPHILS NFR BLD: 89.2 %
NT-PROBNP SERPL-MCNC: 977 PG/ML (ref 0–124)
O2 THERAPY: ABNORMAL
PCO2 BLDV: 57.5 MMHG (ref 40–50)
PH BLDV: 7.37 [PH] (ref 7.35–7.45)
PLATELET # BLD AUTO: 184 K/UL (ref 135–450)
PMV BLD AUTO: 7.3 FL (ref 5–10.5)
PO2 BLDV: 32.5 MMHG (ref 25–40)
POTASSIUM SERPL-SCNC: 3.7 MMOL/L (ref 3.5–5.1)
PROT SERPL-MCNC: 6.7 G/DL (ref 6.4–8.2)
RBC # BLD AUTO: 4.23 M/UL (ref 4–5.2)
SAO2 % BLDV: 59 %
SARS-COV-2 RNA RESP QL NAA+PROBE: NOT DETECTED
SODIUM SERPL-SCNC: 136 MMOL/L (ref 136–145)
TROPONIN, HIGH SENSITIVITY: 17 NG/L (ref 0–14)
TROPONIN, HIGH SENSITIVITY: 17 NG/L (ref 0–14)
WBC # BLD AUTO: 11.7 K/UL (ref 4–11)

## 2023-11-06 PROCEDURE — 71045 X-RAY EXAM CHEST 1 VIEW: CPT

## 2023-11-06 PROCEDURE — 82803 BLOOD GASES ANY COMBINATION: CPT

## 2023-11-06 PROCEDURE — 93005 ELECTROCARDIOGRAM TRACING: CPT | Performed by: STUDENT IN AN ORGANIZED HEALTH CARE EDUCATION/TRAINING PROGRAM

## 2023-11-06 PROCEDURE — 83880 ASSAY OF NATRIURETIC PEPTIDE: CPT

## 2023-11-06 PROCEDURE — 36415 COLL VENOUS BLD VENIPUNCTURE: CPT

## 2023-11-06 PROCEDURE — 2580000003 HC RX 258: Performed by: STUDENT IN AN ORGANIZED HEALTH CARE EDUCATION/TRAINING PROGRAM

## 2023-11-06 PROCEDURE — 2700000000 HC OXYGEN THERAPY PER DAY

## 2023-11-06 PROCEDURE — 5A09457 ASSISTANCE WITH RESPIRATORY VENTILATION, 24-96 CONSECUTIVE HOURS, CONTINUOUS POSITIVE AIRWAY PRESSURE: ICD-10-PCS | Performed by: INTERNAL MEDICINE

## 2023-11-06 PROCEDURE — 84484 ASSAY OF TROPONIN QUANT: CPT

## 2023-11-06 PROCEDURE — 93010 ELECTROCARDIOGRAM REPORT: CPT | Performed by: INTERNAL MEDICINE

## 2023-11-06 PROCEDURE — 94660 CPAP INITIATION&MGMT: CPT

## 2023-11-06 PROCEDURE — 83605 ASSAY OF LACTIC ACID: CPT

## 2023-11-06 PROCEDURE — 6370000000 HC RX 637 (ALT 250 FOR IP): Performed by: STUDENT IN AN ORGANIZED HEALTH CARE EDUCATION/TRAINING PROGRAM

## 2023-11-06 PROCEDURE — 85025 COMPLETE CBC W/AUTO DIFF WBC: CPT

## 2023-11-06 PROCEDURE — 87040 BLOOD CULTURE FOR BACTERIA: CPT

## 2023-11-06 PROCEDURE — 87636 SARSCOV2 & INF A&B AMP PRB: CPT

## 2023-11-06 PROCEDURE — 96374 THER/PROPH/DIAG INJ IV PUSH: CPT

## 2023-11-06 PROCEDURE — 94761 N-INVAS EAR/PLS OXIMETRY MLT: CPT

## 2023-11-06 PROCEDURE — 80053 COMPREHEN METABOLIC PANEL: CPT

## 2023-11-06 PROCEDURE — 2000000000 HC ICU R&B

## 2023-11-06 PROCEDURE — 99285 EMERGENCY DEPT VISIT HI MDM: CPT

## 2023-11-06 PROCEDURE — 6360000002 HC RX W HCPCS: Performed by: STUDENT IN AN ORGANIZED HEALTH CARE EDUCATION/TRAINING PROGRAM

## 2023-11-06 PROCEDURE — 6370000000 HC RX 637 (ALT 250 FOR IP): Performed by: INTERNAL MEDICINE

## 2023-11-06 PROCEDURE — 5A09457 ASSISTANCE WITH RESPIRATORY VENTILATION, 24-96 CONSECUTIVE HOURS, CONTINUOUS POSITIVE AIRWAY PRESSURE: ICD-10-PCS

## 2023-11-06 RX ORDER — IPRATROPIUM BROMIDE AND ALBUTEROL SULFATE 2.5; .5 MG/3ML; MG/3ML
1 SOLUTION RESPIRATORY (INHALATION)
Status: DISCONTINUED | OUTPATIENT
Start: 2023-11-07 | End: 2023-11-07

## 2023-11-06 RX ORDER — DILTIAZEM HYDROCHLORIDE 120 MG/1
240 CAPSULE, COATED, EXTENDED RELEASE ORAL DAILY
Status: DISCONTINUED | OUTPATIENT
Start: 2023-11-07 | End: 2023-11-09 | Stop reason: HOSPADM

## 2023-11-06 RX ORDER — POLYETHYLENE GLYCOL 3350 17 G/17G
17 POWDER, FOR SOLUTION ORAL DAILY PRN
Status: DISCONTINUED | OUTPATIENT
Start: 2023-11-06 | End: 2023-11-09 | Stop reason: HOSPADM

## 2023-11-06 RX ORDER — ONDANSETRON 2 MG/ML
4 INJECTION INTRAMUSCULAR; INTRAVENOUS EVERY 6 HOURS PRN
Status: DISCONTINUED | OUTPATIENT
Start: 2023-11-06 | End: 2023-11-09 | Stop reason: HOSPADM

## 2023-11-06 RX ORDER — ACETAMINOPHEN 325 MG/1
650 TABLET ORAL EVERY 6 HOURS PRN
Status: DISCONTINUED | OUTPATIENT
Start: 2023-11-06 | End: 2023-11-09 | Stop reason: HOSPADM

## 2023-11-06 RX ORDER — BENZONATATE 100 MG/1
200 CAPSULE ORAL EVERY 4 HOURS PRN
Status: DISCONTINUED | OUTPATIENT
Start: 2023-11-06 | End: 2023-11-06

## 2023-11-06 RX ORDER — PREDNISONE 20 MG/1
40 TABLET ORAL DAILY
Status: DISCONTINUED | OUTPATIENT
Start: 2023-11-07 | End: 2023-11-07

## 2023-11-06 RX ORDER — TRAMADOL HYDROCHLORIDE 50 MG/1
50 TABLET ORAL EVERY 6 HOURS PRN
Status: ON HOLD | COMMUNITY

## 2023-11-06 RX ORDER — NICOTINE 21 MG/24HR
1 PATCH, TRANSDERMAL 24 HOURS TRANSDERMAL DAILY
Status: DISCONTINUED | OUTPATIENT
Start: 2023-11-07 | End: 2023-11-09 | Stop reason: HOSPADM

## 2023-11-06 RX ORDER — PIRFENIDONE 267 MG/1
1 TABLET, FILM COATED ORAL 3 TIMES DAILY
Status: DISCONTINUED | OUTPATIENT
Start: 2023-11-06 | End: 2023-11-09 | Stop reason: HOSPADM

## 2023-11-06 RX ORDER — MIRTAZAPINE 15 MG/1
30 TABLET, FILM COATED ORAL NIGHTLY
Status: DISCONTINUED | OUTPATIENT
Start: 2023-11-06 | End: 2023-11-09 | Stop reason: HOSPADM

## 2023-11-06 RX ORDER — PANTOPRAZOLE SODIUM 40 MG/1
40 TABLET, DELAYED RELEASE ORAL
Status: DISCONTINUED | OUTPATIENT
Start: 2023-11-07 | End: 2023-11-09 | Stop reason: HOSPADM

## 2023-11-06 RX ORDER — LANOLIN ALCOHOL/MO/W.PET/CERES
3 CREAM (GRAM) TOPICAL NIGHTLY
Status: DISCONTINUED | OUTPATIENT
Start: 2023-11-06 | End: 2023-11-09 | Stop reason: HOSPADM

## 2023-11-06 RX ORDER — ALBUTEROL SULFATE 2.5 MG/3ML
2.5 SOLUTION RESPIRATORY (INHALATION)
Status: DISCONTINUED | OUTPATIENT
Start: 2023-11-06 | End: 2023-11-09 | Stop reason: HOSPADM

## 2023-11-06 RX ORDER — QUETIAPINE FUMARATE 25 MG/1
12.5 TABLET, FILM COATED ORAL 2 TIMES DAILY
Status: DISCONTINUED | OUTPATIENT
Start: 2023-11-06 | End: 2023-11-09 | Stop reason: HOSPADM

## 2023-11-06 RX ORDER — FERROUS SULFATE 325(65) MG
325 TABLET ORAL
Status: DISCONTINUED | OUTPATIENT
Start: 2023-11-07 | End: 2023-11-09 | Stop reason: HOSPADM

## 2023-11-06 RX ORDER — GUAIFENESIN 600 MG/1
600 TABLET, EXTENDED RELEASE ORAL 2 TIMES DAILY
Status: DISCONTINUED | OUTPATIENT
Start: 2023-11-06 | End: 2023-11-09 | Stop reason: HOSPADM

## 2023-11-06 RX ORDER — ACETAMINOPHEN 650 MG/1
650 SUPPOSITORY RECTAL EVERY 6 HOURS PRN
Status: DISCONTINUED | OUTPATIENT
Start: 2023-11-06 | End: 2023-11-09 | Stop reason: HOSPADM

## 2023-11-06 RX ORDER — ATORVASTATIN CALCIUM 40 MG/1
40 TABLET, FILM COATED ORAL NIGHTLY
Status: DISCONTINUED | OUTPATIENT
Start: 2023-11-06 | End: 2023-11-09 | Stop reason: HOSPADM

## 2023-11-06 RX ORDER — SERTRALINE HYDROCHLORIDE 100 MG/1
200 TABLET, FILM COATED ORAL DAILY
Status: DISCONTINUED | OUTPATIENT
Start: 2023-11-07 | End: 2023-11-09 | Stop reason: HOSPADM

## 2023-11-06 RX ORDER — BENZONATATE 100 MG/1
200 CAPSULE ORAL 3 TIMES DAILY PRN
Status: DISCONTINUED | OUTPATIENT
Start: 2023-11-06 | End: 2023-11-09 | Stop reason: HOSPADM

## 2023-11-06 RX ORDER — HYDROXYZINE HYDROCHLORIDE 10 MG/1
10 TABLET, FILM COATED ORAL 3 TIMES DAILY PRN
Status: DISCONTINUED | OUTPATIENT
Start: 2023-11-06 | End: 2023-11-09 | Stop reason: HOSPADM

## 2023-11-06 RX ORDER — LORAZEPAM 0.5 MG/1
0.5 TABLET ORAL EVERY 6 HOURS PRN
Status: ON HOLD | COMMUNITY

## 2023-11-06 RX ORDER — LEVOTHYROXINE SODIUM 0.1 MG/1
100 TABLET ORAL DAILY
Status: DISCONTINUED | OUTPATIENT
Start: 2023-11-07 | End: 2023-11-09 | Stop reason: HOSPADM

## 2023-11-06 RX ORDER — PROMETHAZINE HYDROCHLORIDE 25 MG/1
12.5 TABLET ORAL EVERY 6 HOURS PRN
Status: DISCONTINUED | OUTPATIENT
Start: 2023-11-06 | End: 2023-11-09 | Stop reason: HOSPADM

## 2023-11-06 RX ORDER — MAGNESIUM SULFATE IN WATER 40 MG/ML
2000 INJECTION, SOLUTION INTRAVENOUS PRN
Status: DISCONTINUED | OUTPATIENT
Start: 2023-11-06 | End: 2023-11-09 | Stop reason: HOSPADM

## 2023-11-06 RX ORDER — ENOXAPARIN SODIUM 100 MG/ML
30 INJECTION SUBCUTANEOUS DAILY
Status: DISCONTINUED | OUTPATIENT
Start: 2023-11-07 | End: 2023-11-08

## 2023-11-06 RX ORDER — IPRATROPIUM BROMIDE AND ALBUTEROL SULFATE 2.5; .5 MG/3ML; MG/3ML
1 SOLUTION RESPIRATORY (INHALATION) ONCE
Status: COMPLETED | OUTPATIENT
Start: 2023-11-06 | End: 2023-11-06

## 2023-11-06 RX ORDER — POTASSIUM CHLORIDE 7.45 MG/ML
10 INJECTION INTRAVENOUS PRN
Status: DISCONTINUED | OUTPATIENT
Start: 2023-11-06 | End: 2023-11-09 | Stop reason: HOSPADM

## 2023-11-06 RX ADMIN — MIRTAZAPINE 30 MG: 15 TABLET, FILM COATED ORAL at 22:23

## 2023-11-06 RX ADMIN — QUETIAPINE FUMARATE 12.5 MG: 25 TABLET ORAL at 22:23

## 2023-11-06 RX ADMIN — IPRATROPIUM BROMIDE AND ALBUTEROL SULFATE 1 DOSE: 2.5; .5 SOLUTION RESPIRATORY (INHALATION) at 18:42

## 2023-11-06 RX ADMIN — Medication 3 MG: at 22:23

## 2023-11-06 RX ADMIN — ATORVASTATIN CALCIUM 40 MG: 40 TABLET, FILM COATED ORAL at 22:24

## 2023-11-06 RX ADMIN — METHYLPREDNISOLONE SODIUM SUCCINATE 125 MG: 125 INJECTION INTRAMUSCULAR; INTRAVENOUS at 18:00

## 2023-11-06 RX ADMIN — GUAIFENESIN 600 MG: 600 TABLET, EXTENDED RELEASE ORAL at 22:24

## 2023-11-06 ASSESSMENT — PAIN - FUNCTIONAL ASSESSMENT: PAIN_FUNCTIONAL_ASSESSMENT: NONE - DENIES PAIN

## 2023-11-06 ASSESSMENT — PAIN SCALES - WONG BAKER: WONGBAKER_NUMERICALRESPONSE: 0

## 2023-11-06 ASSESSMENT — PAIN SCALES - GENERAL
PAINLEVEL_OUTOF10: 0
PAINLEVEL_OUTOF10: 0

## 2023-11-06 NOTE — ED PROVIDER NOTES
[facility] ICU     EMERGENCY DEPARTMENT ENCOUNTER            Pt Name: Dominick Kan   MRN: [id number]   [address] 1955   Date of evaluation: 11/6/2023   Provider: Alise Burger MD   PCP: Noam Horton MD   Note Started: 4:50 PM EST 11/6/23          CHIEF COMPLAINT     Chief Complaint   Patient presents with    Shortness of Breath     Patient arrives via ems with c/o sob. Patient was d/c from icu yesterday. SpO2 89 % on 15 lpm nonrebreather. RR30/min        HISTORY OF PRESENT ILLNESS:   History from : Patient   Limitations to history : None     Dominick Kan is a 77 y.o. female who presents complaining of shortness of breath. Patient states that she normally wears oxygen at home but she is unsure how much oxygen she wears. Was recently admitted to the ICU, had influenza at that time and was discharged to rehab. She states that she went home from rehab yesterday. She said worsening shortness of breath since then. Denies fevers or cough. Denies chest pain. She denies any other complaints or concerns. Nursing Notes were all reviewed and agreed with, or any disagreements were addressed in the HPI. REVIEW OF SYSTEMS :    Positives and Pertinent negatives as per HPI. MEDICAL HISTORY   has a past medical history of A-fib ([address]), Acute cystitis without hematuria, Anxiety, CHF (congestive heart failure) ([address]), COPD (chronic obstructive pulmonary disease) ([address]), Cryptogenic organizing pneumonia ([address]), Depression, Diabetes mellitus ([address]), GERD (gastroesophageal reflux disease), Hyperlipidemia, Hypertension, ILD (interstitial lung disease) ([address]), On home oxygen therapy, Rash, and Thyroid disease. Past Surgical History:   Procedure Laterality Date    ARM SURGERY Left 3/2/2020    LEFT ULNAR NERVE DECOMPRESSION AT THE ELBOW performed by Marybeth Vásquez MD at [address] [facility] [address] N/A 6/27/2019    EBUS WF W/ANES.  performed by Serene Mittal MD at [facility] CONSULTS:   None   Discussion with Other Professionals: None   {Social Determinants: None   Chronic Conditions: Chronic respiratory failure  Records Reviewed: NA      Disposition Considerations: Admitted   I am the Primary Clinician of Record. FINAL IMPRESSION    1. Acute on chronic respiratory failure with hypoxia and hypercapnia (HCC)           DISPOSITION/PLAN     PATIENT REFERRED TO:   No follow-up provider specified.      DISCHARGE MEDICATIONS:   Current Discharge Medication List           DISCONTINUED MEDICATIONS:   Current Discharge Medication List        STOP taking these medications       QUEtiapine (SEROQUEL) 25 MG tablet Comments:   Reason for Stopping:                      (Please note that portions of this note were completed with a voice recognition program.  Efforts were made to edit the dictations but occasionally words are mis-transcribed.)       Adrian White MD (electronically signed)             Adrian White MD  11/06/23 7373

## 2023-11-07 LAB
ALBUMIN SERPL-MCNC: 3.6 G/DL (ref 3.4–5)
ALBUMIN/GLOB SERPL: 1.2 {RATIO} (ref 1.1–2.2)
ALP SERPL-CCNC: 92 U/L (ref 40–129)
ALT SERPL-CCNC: 18 U/L (ref 10–40)
ANION GAP SERPL CALCULATED.3IONS-SCNC: 9 MMOL/L (ref 3–16)
AST SERPL-CCNC: 16 U/L (ref 15–37)
BASE EXCESS BLDV CALC-SCNC: 7 MMOL/L (ref -3–3)
BASOPHILS # BLD: 0 K/UL (ref 0–0.2)
BASOPHILS NFR BLD: 0.2 %
BILIRUB SERPL-MCNC: 0.3 MG/DL (ref 0–1)
BUN SERPL-MCNC: 9 MG/DL (ref 7–20)
CALCIUM SERPL-MCNC: 8.9 MG/DL (ref 8.3–10.6)
CHLORIDE SERPL-SCNC: 96 MMOL/L (ref 99–110)
CO2 BLDV-SCNC: 34 MMOL/L
CO2 SERPL-SCNC: 31 MMOL/L (ref 21–32)
COHGB MFR BLDV: 2.7 % (ref 0–1.5)
CREAT SERPL-MCNC: <0.5 MG/DL (ref 0.6–1.2)
DEPRECATED RDW RBC AUTO: 16.2 % (ref 12.4–15.4)
EKG ATRIAL RATE: 88 BPM
EKG DIAGNOSIS: NORMAL
EKG P AXIS: 15 DEGREES
EKG P-R INTERVAL: 144 MS
EKG Q-T INTERVAL: 394 MS
EKG QRS DURATION: 74 MS
EKG QTC CALCULATION (BAZETT): 476 MS
EKG R AXIS: 34 DEGREES
EKG T AXIS: 231 DEGREES
EKG VENTRICULAR RATE: 88 BPM
EOSINOPHIL # BLD: 0 K/UL (ref 0–0.6)
EOSINOPHIL NFR BLD: 0 %
GFR SERPLBLD CREATININE-BSD FMLA CKD-EPI: >60 ML/MIN/{1.73_M2}
GLUCOSE BLD-MCNC: 113 MG/DL (ref 70–99)
GLUCOSE BLD-MCNC: 147 MG/DL (ref 70–99)
GLUCOSE BLD-MCNC: 151 MG/DL (ref 70–99)
GLUCOSE BLD-MCNC: 248 MG/DL (ref 70–99)
GLUCOSE SERPL-MCNC: 188 MG/DL (ref 70–99)
HCO3 BLDV-SCNC: 32.3 MMOL/L (ref 23–29)
HCT VFR BLD AUTO: 34.7 % (ref 36–48)
HGB BLD-MCNC: 11.2 G/DL (ref 12–16)
LYMPHOCYTES # BLD: 0.7 K/UL (ref 1–5.1)
LYMPHOCYTES NFR BLD: 7.8 %
MCH RBC QN AUTO: 27.7 PG (ref 26–34)
MCHC RBC AUTO-ENTMCNC: 32.2 G/DL (ref 31–36)
MCV RBC AUTO: 86.1 FL (ref 80–100)
METHGB MFR BLDV: 0.3 %
MONOCYTES # BLD: 0.1 K/UL (ref 0–1.3)
MONOCYTES NFR BLD: 1.2 %
NEUTROPHILS # BLD: 7.6 K/UL (ref 1.7–7.7)
NEUTROPHILS NFR BLD: 90.8 %
O2 CT VFR BLDV CALC: 17 VOL %
O2 THERAPY: ABNORMAL
PCO2 BLDV: 48.5 MMHG (ref 40–50)
PERFORMED ON: ABNORMAL
PH BLDV: 7.44 [PH] (ref 7.35–7.45)
PLATELET # BLD AUTO: 142 K/UL (ref 135–450)
PMV BLD AUTO: 7.4 FL (ref 5–10.5)
PO2 BLDV: 174.2 MMHG (ref 25–40)
POTASSIUM SERPL-SCNC: 3.7 MMOL/L (ref 3.5–5.1)
PROT SERPL-MCNC: 6.7 G/DL (ref 6.4–8.2)
RBC # BLD AUTO: 4.03 M/UL (ref 4–5.2)
SAO2 % BLDV: 99 %
SODIUM SERPL-SCNC: 136 MMOL/L (ref 136–145)
TROPONIN, HIGH SENSITIVITY: 11 NG/L (ref 0–14)
WBC # BLD AUTO: 8.4 K/UL (ref 4–11)

## 2023-11-07 PROCEDURE — 6370000000 HC RX 637 (ALT 250 FOR IP): Performed by: INTERNAL MEDICINE

## 2023-11-07 PROCEDURE — 94660 CPAP INITIATION&MGMT: CPT

## 2023-11-07 PROCEDURE — 99223 1ST HOSP IP/OBS HIGH 75: CPT | Performed by: INTERNAL MEDICINE

## 2023-11-07 PROCEDURE — 97530 THERAPEUTIC ACTIVITIES: CPT

## 2023-11-07 PROCEDURE — 94761 N-INVAS EAR/PLS OXIMETRY MLT: CPT

## 2023-11-07 PROCEDURE — 82803 BLOOD GASES ANY COMBINATION: CPT

## 2023-11-07 PROCEDURE — 6360000002 HC RX W HCPCS: Performed by: INTERNAL MEDICINE

## 2023-11-07 PROCEDURE — 97161 PT EVAL LOW COMPLEX 20 MIN: CPT

## 2023-11-07 PROCEDURE — 2000000000 HC ICU R&B

## 2023-11-07 PROCEDURE — 84484 ASSAY OF TROPONIN QUANT: CPT

## 2023-11-07 PROCEDURE — 80053 COMPREHEN METABOLIC PANEL: CPT

## 2023-11-07 PROCEDURE — 2580000003 HC RX 258: Performed by: INTERNAL MEDICINE

## 2023-11-07 PROCEDURE — 93010 ELECTROCARDIOGRAM REPORT: CPT | Performed by: INTERNAL MEDICINE

## 2023-11-07 PROCEDURE — 97166 OT EVAL MOD COMPLEX 45 MIN: CPT

## 2023-11-07 PROCEDURE — 93005 ELECTROCARDIOGRAM TRACING: CPT | Performed by: INTERNAL MEDICINE

## 2023-11-07 PROCEDURE — 94640 AIRWAY INHALATION TREATMENT: CPT

## 2023-11-07 PROCEDURE — 97112 NEUROMUSCULAR REEDUCATION: CPT

## 2023-11-07 PROCEDURE — 36415 COLL VENOUS BLD VENIPUNCTURE: CPT

## 2023-11-07 PROCEDURE — 2700000000 HC OXYGEN THERAPY PER DAY

## 2023-11-07 PROCEDURE — 99233 SBSQ HOSP IP/OBS HIGH 50: CPT | Performed by: INTERNAL MEDICINE

## 2023-11-07 PROCEDURE — 85025 COMPLETE CBC W/AUTO DIFF WBC: CPT

## 2023-11-07 RX ORDER — PREDNISONE 20 MG/1
40 TABLET ORAL DAILY
Status: DISCONTINUED | OUTPATIENT
Start: 2023-11-08 | End: 2023-11-08

## 2023-11-07 RX ORDER — NADOLOL 40 MG/1
20 TABLET ORAL DAILY
Status: DISCONTINUED | OUTPATIENT
Start: 2023-11-07 | End: 2023-11-09 | Stop reason: HOSPADM

## 2023-11-07 RX ORDER — LORAZEPAM 0.5 MG/1
0.5 TABLET ORAL EVERY 6 HOURS PRN
Status: DISCONTINUED | OUTPATIENT
Start: 2023-11-07 | End: 2023-11-09 | Stop reason: HOSPADM

## 2023-11-07 RX ORDER — IPRATROPIUM BROMIDE AND ALBUTEROL SULFATE 2.5; .5 MG/3ML; MG/3ML
1 SOLUTION RESPIRATORY (INHALATION)
Status: DISCONTINUED | OUTPATIENT
Start: 2023-11-07 | End: 2023-11-08

## 2023-11-07 RX ADMIN — ACETAMINOPHEN 650 MG: 325 TABLET ORAL at 18:53

## 2023-11-07 RX ADMIN — PIRFENIDONE 1 TABLET: 267 TABLET, FILM COATED ORAL at 16:24

## 2023-11-07 RX ADMIN — QUETIAPINE FUMARATE 12.5 MG: 25 TABLET ORAL at 21:05

## 2023-11-07 RX ADMIN — NADOLOL 20 MG: 40 TABLET ORAL at 10:43

## 2023-11-07 RX ADMIN — GUAIFENESIN 600 MG: 600 TABLET, EXTENDED RELEASE ORAL at 21:05

## 2023-11-07 RX ADMIN — FERROUS SULFATE TAB 325 MG (65 MG ELEMENTAL FE) 325 MG: 325 (65 FE) TAB at 10:44

## 2023-11-07 RX ADMIN — ATORVASTATIN CALCIUM 40 MG: 40 TABLET, FILM COATED ORAL at 21:05

## 2023-11-07 RX ADMIN — IPRATROPIUM BROMIDE AND ALBUTEROL SULFATE 1 DOSE: .5; 2.5 SOLUTION RESPIRATORY (INHALATION) at 08:32

## 2023-11-07 RX ADMIN — LORAZEPAM 0.5 MG: 0.5 TABLET ORAL at 10:52

## 2023-11-07 RX ADMIN — IPRATROPIUM BROMIDE AND ALBUTEROL SULFATE 1 DOSE: .5; 2.5 SOLUTION RESPIRATORY (INHALATION) at 15:40

## 2023-11-07 RX ADMIN — QUETIAPINE FUMARATE 12.5 MG: 25 TABLET ORAL at 10:43

## 2023-11-07 RX ADMIN — LEVOTHYROXINE SODIUM 100 MCG: 100 TABLET ORAL at 06:39

## 2023-11-07 RX ADMIN — IPRATROPIUM BROMIDE AND ALBUTEROL SULFATE 1 DOSE: .5; 2.5 SOLUTION RESPIRATORY (INHALATION) at 20:11

## 2023-11-07 RX ADMIN — MIRTAZAPINE 30 MG: 15 TABLET, FILM COATED ORAL at 21:05

## 2023-11-07 RX ADMIN — GUAIFENESIN 600 MG: 600 TABLET, EXTENDED RELEASE ORAL at 10:44

## 2023-11-07 RX ADMIN — MUPIROCIN: 20 OINTMENT TOPICAL at 10:55

## 2023-11-07 RX ADMIN — MUPIROCIN: 20 OINTMENT TOPICAL at 21:05

## 2023-11-07 RX ADMIN — Medication 3 MG: at 21:05

## 2023-11-07 RX ADMIN — PANTOPRAZOLE SODIUM 40 MG: 40 TABLET, DELAYED RELEASE ORAL at 06:39

## 2023-11-07 RX ADMIN — SERTRALINE 200 MG: 100 TABLET, FILM COATED ORAL at 10:44

## 2023-11-07 RX ADMIN — AZITHROMYCIN MONOHYDRATE 500 MG: 500 INJECTION, POWDER, LYOPHILIZED, FOR SOLUTION INTRAVENOUS at 01:36

## 2023-11-07 RX ADMIN — LORAZEPAM 0.5 MG: 0.5 TABLET ORAL at 21:05

## 2023-11-07 RX ADMIN — DILTIAZEM HYDROCHLORIDE 240 MG: 120 CAPSULE, COATED, EXTENDED RELEASE ORAL at 10:43

## 2023-11-07 RX ADMIN — PIRFENIDONE 1 TABLET: 267 TABLET, FILM COATED ORAL at 10:53

## 2023-11-07 RX ADMIN — ENOXAPARIN SODIUM 30 MG: 100 INJECTION SUBCUTANEOUS at 10:44

## 2023-11-07 RX ADMIN — ACETAMINOPHEN 650 MG: 325 TABLET ORAL at 04:58

## 2023-11-07 ASSESSMENT — PAIN SCALES - GENERAL
PAINLEVEL_OUTOF10: 5
PAINLEVEL_OUTOF10: 0
PAINLEVEL_OUTOF10: 4
PAINLEVEL_OUTOF10: 0
PAINLEVEL_OUTOF10: 0
PAINLEVEL_OUTOF10: 7
PAINLEVEL_OUTOF10: 6

## 2023-11-07 ASSESSMENT — PAIN DESCRIPTION - LOCATION
LOCATION: HEAD
LOCATION: CHEST

## 2023-11-07 ASSESSMENT — PAIN DESCRIPTION - DESCRIPTORS
DESCRIPTORS: TIGHTNESS
DESCRIPTORS: ACHING
DESCRIPTORS: ACHING

## 2023-11-07 ASSESSMENT — PAIN DESCRIPTION - ORIENTATION
ORIENTATION: MID
ORIENTATION: POSTERIOR

## 2023-11-07 ASSESSMENT — PAIN - FUNCTIONAL ASSESSMENT: PAIN_FUNCTIONAL_ASSESSMENT: ACTIVITIES ARE NOT PREVENTED

## 2023-11-07 NOTE — CONSULTS
6/27/2019    BRONCHOSCOPY ALVEOLAR LAVAGE performed by Ameya Wiley MD at [address] [place] [address]  07/10/2019    BRONCHOSCOPY N/A 7/10/2019    BRONCHOSCOPY ALVEOLAR LAVAGE performed by Jonathan Guillaume MD at [address] [place] [address] Bilateral 08/06/2019    BRONCHOSCOPY N/A 8/6/2019    BRONCHOSCOPY ALVEOLAR LAVAGE performed by Antonieta Newman MD at [address] [place] [address] N/A 12/24/2019    BRONCHOSCOPY ALVEOLAR LAVAGE performed by Olivia Ho MD at [address] [place] [address] N/A 8/25/2022    COLONOSCOPY POLYPECTOMY SNARE/COLD BIOPSY performed by Whit Harper MD at [facility]  9/15/2022    CT GUIDED CHEST TUBE 9/15/2022 [facility] CT SCAN    CT INSERT CATH PLEURA W IMAGE  11/28/2022    CT INSERT CATH PLEURA W IMAGE 11/28/2022 Travis Nesbitt MD [facility] CT SCAN    HYSTERECTOMY, TOTAL ABDOMINAL (CERVIX REMOVED)      partial    IR MIDLINE CATH  10/16/2023    IR MIDLINE CATH 10/16/2023 [facility] SPECIAL PROCEDURES       FAMILY HISTORY:  family history includes Asthma in her sister; Diabetes in her mother; High Blood Pressure in her mother; Other in her father. SOCIAL HISTORY:   reports that she has never smoked.  She has never used smokeless tobacco.    Scheduled Meds:   azithromycin  500 mg IntraVENous Q24H    mupirocin   Each Nostril BID    atorvastatin  40 mg Oral Nightly    dilTIAZem  240 mg Oral Daily    ferrous sulfate  325 mg Oral Daily with breakfast    levothyroxine  100 mcg Oral Daily    pantoprazole  40 mg Oral QAM AC    Pirfenidone  1 tablet Oral TID    QUEtiapine  12.5 mg Oral BID    sertraline  200 mg Oral Daily    mirtazapine  30 mg Oral Nightly    enoxaparin  30 mg SubCUTAneous Daily    nicotine  1 patch TransDERmal Daily    melatonin  3 mg Oral Nightly    ipratropium 0.5 mg-albuterol 2.5 mg  1 Dose Inhalation Q4H WA RT    guaiFENesin  600 mg Oral BID    predniSONE  40 mg Oral Daily     Continuous Infusions:    PRN input(s): \"PHART\", \"IBV2WCQ\", \"PO2ART\" in the last 72 hours.     Microbiology:  11/7/2023 SARS-CoV-2 and influenza are negative  11/7/2023 blood sent    Imaging:  Chest imaging was reviewed by me and showed   CXR 11/6/2023 chronic changes of ILD    ASSESSMENT:  Acute on chronic hypoxemic & hypercapnic respiratory failure, baseline is between 4 L and 8 L   Progressive ILD: h/o COPD, possible NSIP v Chronic HP on biopsy, f/b Al Gregoriaby, tx with pirfenidone  Paroxysmal atrial fibrillation not on anticoagulation 2/2 recurrent GI bleeding   COPD/emphysema  Pulmonary hypertension-ILD, WHO group 3  Recent severe epistaxis   Carboxyhemoglobin is mildly elevated - no known risk factors, no gas appliances/furnace, no attached garage, no tobacco users in home  Never smoker    PLAN:  Vapotherm heated humidified high flow nasal oxygen for life threatening respiratory failure   Prednisone 40 with plan to taper back to 10   Check EKG given chest pain -- reviewed by me, appears unchanged with sinus rhythm @ 88  Inhaled bronchodilators PRN  Prophylaxis: Lovenox, Bactroban, Pepcid

## 2023-11-07 NOTE — PROGRESS NOTES
11/07/23 0307   NIV Type   Mode Bilevel   Mask Type Full face mask   Mask Size Small   Assessment   Respirations 24   SpO2 99 %   Settings/Measurements   IPAP 16 cmH20   CPAP/EPAP 8 cmH2O   Vt (Measured) 660 mL   FiO2  45 %

## 2023-11-07 NOTE — H&P
Hospital Medicine History & Physical      PCP: Lupillo Castro MD    Date of Admission: 11/6/2023    Date of Service: Pt seen/examined on 11/6/2023    Pt seen/examined face to face on and admitted as inpatient with expected LOS to be two days but can change depending on diagnostic work up and treatment response. Chief Complaint:    Chief Complaint   Patient presents with    Shortness of Breath     Patient arrives via ems with c/o sob. Patient was d/c from icu yesterday. SpO2 89 % on 15 lpm nonrebreather. RR30/min        History Of Present Illness:      77 y.o. female who presented to [facility] with past medical history of atrial fibrillation, depression, COPD, type 2 diabetes, GERD, hypertension, hyperlipidemia, ILD presented to the ED with chief complaint of shortness of breath    Patient reported the patient has been having worsening shortness of breath progressively worsening after discharge from rehab. Patient reported that she slept and woke up worsening shortness of breath and has not been able to recover. Patient does have a that she has been for years in addition denied having any burning in also aromas no recent sickness. Patient does report she has chronic hypoxia but the oxygen continue to drop despite her baseline.       Past Medical History:          Diagnosis Date    A-fib ([address])     Acute cystitis without hematuria     Anxiety     CHF (congestive heart failure) (HCC)     COPD (chronic obstructive pulmonary disease) ([address])     Cryptogenic organizing pneumonia ([address])     Depression     Diabetes mellitus ([address])     GERD (gastroesophageal reflux disease)     Hyperlipidemia     Hypertension     ILD (interstitial lung disease) ([address])     On home oxygen therapy     uses O2 3L prn    Rash     Thyroid disease     hypothyroidism       Past Surgical History:          Procedure Laterality Date    ARM SURGERY Left 3/2/2020    LEFT ULNAR NERVE DECOMPRESSION AT THE ELBOW performed by Keenan Geiger clear.  Neck: Supple,Trachea midline, no goiter  Respiratory:minimal accessory muscle usage, Normal respiratory effort. Diminished air movements, BiPAP initiated   cardiovascular:  Regular rate and rhythm, capillary refill 2 seconds  Abdomen: Soft, non-tender, non-distended with normal bowel sounds. Musculoskeletal:  No clubbing, cyanosis. trace edema LE bilaterally. Skin: turgor normal.  No new rashes or lesions. Neurologic: Alert and oriented x4, no new focal sensory/motor deficits. Labs:     Recent Labs     11/06/23  1705   WBC 11.7*   HGB 11.7*   HCT 35.9*        Recent Labs     11/06/23  1705      K 3.7   CL 97*   CO2 30   BUN 9   CREATININE <0.5*   CALCIUM 9.2     Recent Labs     11/06/23  1705   AST 18   ALT 20   BILITOT 0.3   ALKPHOS 102     No results for input(s): \"INR\" in the last 72 hours. No results for input(s): \"CKTOTAL\", \"TROPONINI\" in the last 72 hours. Urinalysis:      Lab Results   Component Value Date/Time    NITRU Negative 08/22/2023 12:00 PM    WBCUA None seen 06/27/2023 09:27 PM    BACTERIA Rare 05/28/2022 06:39 AM    RBCUA 0-2 06/27/2023 09:27 PM    BLOODU Negative 08/22/2023 12:00 PM    SPECGRAV 1.015 08/22/2023 12:00 PM    GLUCOSEU >=1000 08/22/2023 12:00 PM       Radiology:     CXR: I have reviewed the CXR with the following interpretation:   Decreased heart size with resolving pulmonary congestion and stational markings  EKG:  I have reviewed the EKG with the following interpretation:   Normal sinus rhythm    . XR CHEST PORTABLE   Final Result   Slight decrease in the heart size with resolving central pulmonary congestion. Moderate increased interstitial markings throughout which is less prominent   and may represent underlying pulmonary interstitial fibrosis or less likely   recurrent interstitial pneumonitis or interstitial edema. Recommend   short-term follow-up.              ASSESSMENT AND PLAN:    Active Hospital Problems    Diagnosis Date Noted

## 2023-11-07 NOTE — ACP (ADVANCE CARE PLANNING)
Advance Care Planning     General Advance Care Planning (ACP) Conversation    Date of Conversation: 11/6/2023  Conducted with: Did not have POA conversation with the patient today. The patient's spouse can speak for the patient if she cannot speak for herself. Healthcare Decision Maker:    Primary Decision Maker: Kamille Bermudez - Spouse - [phone]    Secondary Decision Maker: Nohemi Eaton - Child - [phone]  Click here to complete Healthcare Decision Makers including selection of the Healthcare Decision Maker Relationship (ie \"Primary\"). Today we documented Decision Maker(s) consistent with ACP documents on file. Content/Action Overview:   Has ACP document(s) on file - reflects the patient's care preferences  Reviewed DNR/DNI and patient elects Full Code (Attempt Resuscitation)        Length of Voluntary ACP Conversation in minutes:  <16 minutes (Non-Billable)    Enrique Acharya RN

## 2023-11-07 NOTE — PROGRESS NOTES
4 Eyes Skin Assessment     NAME:  Alvin Obando  YOB: 1955  MEDICAL RECORD NUMBER:  [id number]    The patient is being assessed for  Admission    I agree that at least one RN has performed a thorough Head to Toe Skin Assessment on the patient. ALL assessment sites listed below have been assessed. Areas assessed by both nurses:    Head, Face, Ears, Shoulders, Back, Chest, Arms, Elbows, Hands, Sacrum. Buttock, Coccyx, Ischium, Legs. Feet and Heels, and Under Medical Devices         Does the Patient have a Wound?  No noted wound(s)       Amos Prevention initiated by RN: Yes  Wound Care Orders initiated by RN: Yes    Pressure Injury (Stage 3,4, Unstageable, DTI, NWPT, and Complex wounds) if present, place Wound referral order by RN under : No    New Ostomies, if present place, Ostomy referral order under : No     Nurse 1 eSignature: Electronically signed by Ant Franco RN on 11/7/23 at 1:39 AM EST    **SHARE this note so that the co-signing nurse can place an eSignature**    Nurse 2 eSignature: Electronically signed by Chilango Dunbar RN on 11/7/23 at 5:04 AM EST

## 2023-11-07 NOTE — PROGRESS NOTES
Comprehensive Nutrition Assessment    Type and Reason for Visit:  Initial (no consult or + screen for this patient but she has a hx of severe malnutrition)    Nutrition Recommendations/Plan:   Continue ADULT DIET; Regular diet order (started this am). Continue Ensure high-protein with meals (started this am). Monitor appetite, meal intake, and acceptance/intake of ONS. Monitor nutrition-related labs, bowel function, and weight trends. Malnutrition Assessment:  Malnutrition Status:  Severe malnutrition (11/07/23 1338)    Context:  Chronic Illness     Findings of the 6 clinical characteristics of malnutrition:  Energy Intake:  75% or less estimated energy requirements for 1 month or longer  Weight Loss:  Greater than 5% over 1 month (-12# or 10.3% weight loss since 10/13/23)     Body Fat Loss:  Severe body fat loss Triceps, Buccal region   Muscle Mass Loss:  Severe muscle mass loss Temples (temporalis), Clavicles (pectoralis & deltoids)  Fluid Accumulation:  No significant fluid accumulation     Strength:  Not Performed    Nutrition Assessment:    patient is nutritionally compromised AEB chronic respiratory dysfunction and poor appetite/po intake + hx of severe malnutrition and she is at risk for further compromise d/t poor appetite/po intake, increased nutrition needs r/t COPD + ILD, and patient was recently d/c'd from the hospital; will continue ADULT DIET;  Regular diet order and started Ensure high-protein with meals this am    Nutrition Related Findings:    patient is A & O x 4; patient presented with c/o SOB; last admission to [facility] was 10/12/23-10/18/23; patient was d/c'd from rehab and went home but immediately started to feel SOB so she came to the hospital; patient has a hx of COPD and ILD; + BM on 11/4/23; she has remeron ordered; diet was advanced this am but patient does not consume much po nutrition Wound Type: None       Current Nutrition Intake & Therapies:    Average Meal Intake: None Identified  Food/Nutrient Intake Outcomes: Food and Nutrient Intake, Supplement Intake  Physical Signs/Symptoms Outcomes: Biochemical Data, Hemodynamic Status, Meal Time Behavior, Nutrition Focused Physical Findings, Skin, Weight    Discharge Planning:    Continue current diet, Continue Oral Nutrition Supplement     Jan Duke, [address] [facility],   Contact: [phone]

## 2023-11-07 NOTE — PROGRESS NOTES
Inpatient Physical Therapy Evaluation & Treatment    Unit: ICU  Date:  11/7/2023  Patient Name:    Usha Acosta  Admitting diagnosis:  Acute exacerbation of chronic obstructive pulmonary disease (COPD) ([address]) [J44.1]  Acute on chronic respiratory failure with hypoxia and hypercapnia ([address]) [U83.18, J96.22]  Admit Date:  11/6/2023  Precautions/Restrictions/WB Status/ Lines/ Wounds/ Oxygen: Fall risk, Bed/chair alarm, Lines (IV, Supplemental O2 (8 L on face mas and 15 L on face maskL), and external catheter), Telemetry, and Continuous pulse oximetry      Pt seen for cotreatment this date due to patient safety, patient endurance, and acute illness/injury    Treatment Time:  1405- 8298  Treatment Number:  1   Timed Code Treatment Minutes: 23 minutes  Total Treatment Minutes:  33  minutes    Patient Stated Goals for Therapy: \"to go home\"          Discharge Recommendations: Home with 24hr assistance and Home PT  DME needs for discharge: Needs Met       Therapy recommendation for EMS Transport: can transport by wheelchair    Therapy recommendations for staff:   Assist of 1 for sitting EOB    History of Present Illness:   Per Dr. Rock Gomes H&P:  77 y.o. female who presented to [facility] with past medical history of atrial fibrillation, depression, COPD, type 2 diabetes, GERD, hypertension, hyperlipidemia, ILD presented to the ED with chief complaint of shortness of breath     Patient reported the patient has been having worsening shortness of breath progressively worsening after discharge from rehab. Patient reported that she slept and woke up worsening shortness of breath and has not been able to recover. Patient does have a that she has been for years in addition denied having any burning in also aromas no recent sickness. Patient does report she has chronic hypoxia but the oxygen continue to drop despite her baseline.     Home Health S4 Level Recommendation:  Level 1 Standard        AM-PAC Mobility Score declined out of bed activity this date. Pt would continue to benefit from skilled PT services to promote increased strength, balance and functional activity tolerance. Recommend continued skilled PT services upon discharge. Recommending Home 24 hr assist and with home PT upon discharge as patient functioning close to baseline level    Goals : To be met in 3 visits:  1). Independent with LE Ex x 10 reps  2). Sit to/from stand: Independent  3). Bed to chair: Independent      To be met in 6 visits:  1). Supine to/from sit: Independent  2). Gait: Ambulate  10 ft.   with SBA and use of LRAD (least restrictive assistive device)  3). Tolerate B LE exercises 3 sets of 10-15 reps      Rehabilitation Potential: Fair  Strengths for achieving goals include:   Pt motivated, PLOF, Family Support, and Pt cooperative   Barriers to achieving goals include:    Chronicity of condition and Weakness    Plan    To be seen 3-5 x / week  while in acute care setting for therapeutic exercises, bed mobility, transfers, progressive gait training, balance training, and family/patient education. Signature: Malcom Mercado PT     If patient discharges from this facility prior to next visit, this note will serve as the Discharge Summary.

## 2023-11-07 NOTE — PROGRESS NOTES
Inpatient Occupational Therapy Evaluation and Treatment    Unit: ICU  Date:  11/7/2023  Patient Name:    Gilson Stage  Admitting diagnosis:  Acute exacerbation of chronic obstructive pulmonary disease (COPD) ([address]) [J44.1]  Acute on chronic respiratory failure with hypoxia and hypercapnia ([address]) [N77.20, J96.22]  Admit Date:  11/6/2023  Precautions/Restrictions/WB Status/ Lines/ Wounds/ Oxygen: Fall risk, Bed/chair alarm, Lines (IV, Supplemental O2 (8L nasal and 15 L on face mask L), and external catheter), Telemetry, and Continuous pulse oximetry    Pt seen for cotreatment this date due to patient safety, patient endurance, complexity of condition, acute illness/injury, and limited functional status information    Treatment Time:  1405 -2090  Treatment Number:  1  Timed Code Treatment Minutes: 25 minutes  Total Treatment Minutes:  35  minutes    Patient Goals for Therapy: none      Discharge Recommendations: Home with 24/7 assist  and Home OT  DME needs for discharge: Needs Met       Therapy recommendations for staff:   Assist of 1 for sitting EOB    History of Present Illness: Per Dr Alexia Cherry 11/7/23:  \"This is a 68-year-old female who has progressive severe ILD and refractory hypoxemia who was recently hospitalized with COVID-19 and then subsequently with influenza A, discharged to inpatient rehab on 10/18/2023. She was discharged from rehab yesterday (11/5) and then developed worsening shortness of breath with refractory hypoxemia and so came back to the emergency department. From this hospital after COVID infection and who represented to the emergency department with a 1 day history of worsening shortness of breath and hypoxemia, initially placed on a nonrebreather, subsequently weaned to 8 L and found to have influenza A. She was recently started on Levaquin in the nursing home. \"    Home Health S4 Level Recommendation:  Level 1 Standard    AM-PAC Score: AM-PAC Inpatient Daily Activity Raw Score: 18     Subjective:  Patient lying reclined in bed with no family present. Pt agreeable to this OT session. Cognition:    A&O x4   Able to follow 2 step commands    Pain:   No  Location:   Rating: NA /10  Pain Medicine Status: No request made    Preadmission Environment:   Pt. Lives                                              Pt. Lives with spouse & dog. Spouse works 4pm-midnight 5 days/week. Daughter comes over 3+ days/week while  is gone. And 2 sons stay with her for 3-4 hours on the other evenings. She helps with bathing, meal prep, and laundry  Home environment:                            one story home  Steps to enter first floor:                     Ramp  Steps to second floor/basement:        N/A  Bathroom:                                           walk in shower, grab bars in shower, shower chair , and comfort height toilet  Equipment owned:                              rollator, manual WC, BSC, shower chair/bench, home O2 (4L, increasing to 10L with movementL) continous, nebulizer, and pulse ox     Preadmission Status:  Pt. Able to drive:                                 No  Pt. Fully independent with ADLs:       Yes (until recently)  Pt. Required assistance for:               Cleaning, Cooking, and Laundry   Pt. independent for functional transfers and utilized Manual W/C for mobility in home. Completes stand pivot transfers w/c to toilet. History of falls:                                                Yes  Home Health Services:                       None    Objective:  Does this pt have an acute or acute on chronic diagnosis of CHF? No    Upper Extremity ROM:    WFL,  pt able to perform all bed mobility, transfers, and gait without ROM limitation.     Upper Extremity Strength:    BUE strength WFL, but not formally assessed w/ MMT    Upper Extremity Sensation:    WFL    Upper Extremity Proprioception:  WFL    Coordination:  WFL    Tone:  WFL    Balance:  Sitting:    SBA - CGA  Standing:

## 2023-11-07 NOTE — CARE COORDINATION
11/07/23 1623   Service Assessment   Patient Orientation Alert and Oriented;Person;Place   Cognition Alert   History Provided By Child/Family   Primary Caregiver Self  (with assistance of spouse)   Support Systems Spouse/Significant Other;Children   Patient's Healthcare Decision Maker is: Legal Next of [facility]   PCP Verified by CM Yes  (Patient saw provider while in [facility] for rehab)   Last Visit to PCP Within last 3 months   Prior Functional Level Assistance with the following:;Bathing;Cooking;Housework; Shopping   Current Functional Level Assistance with the following:;Bathing;Cooking;Housework; Shopping   Can patient return to prior living arrangement Yes   Ability to make needs known: Good   Family able to assist with home care needs: Yes   Would you like for me to discuss the discharge plan with any other family members/significant others, and if so, who? No   Financial Resources Medicare   Community Resources None   Discharge Planning   Type of Residence House   Living Arrangements Spouse/Significant Other   Current Services Prior To Admission C-pap;Oxygen Therapy;Durable Medical Equipment  ([employer] 02 baseline 8L x 24 hrs)   Current DME Prior to [address] (Comment); Cpap  ([employer] 02 baseline 8L x 24 hrs)   Potential Assistance Needed N/A   DME Ordered? No   Potential Assistance Purchasing Medications No   Type of Home Care Services None   Patient expects to be discharged to: House   Follow Up Appointment: Best Day/Time    (self/family)   One/Two Story Residence One story   History of falls? 0     Case Management Assessment  Initial Evaluation    Date/Time of Evaluation: 11/7/2023 4:46 PM  Assessment Completed by: Ruba Nash RN    If patient is discharged prior to next notation, then this note serves as note for discharge by case management.     Patient Name: Keenan Obando                   YOB: 1955  Diagnosis: Acute exacerbation of chronic obstructive pulmonary disease (COPD) ([address] [facility]) [J44.1]  Acute on chronic respiratory failure with hypoxia and hypercapnia (HCC) [E00.92, J96.22]                   Date / Time: 11/6/2023  4:44 PM    Patient Admission Status: Inpatient   Readmission Risk (Low < 19, Mod (19-27), High > 27): Readmission Risk Score: 39.5    Current PCP: Hieu Abbasi MD  PCP verified by CM? Yes (Patient saw provider while in [facility] for rehab)    Chart Reviewed: Yes      History Provided by: Child/Family  Patient Orientation: Alert and Oriented, Person, Place    Patient Cognition: Alert    Hospitalization in the last 30 days (Readmission):  Yes    If yes, Readmission Assessment in  Navigator will be completed. Advance Directives:      Code Status: Full Code   Patient's Primary Decision Maker is: Legal Next of Kin    Primary Decision Maker: Herbert Bray - Spouse - [phone]    Secondary Decision Maker: Marine Enamorado Child - [phone]    Discharge Planning:    Patient lives with: Spouse/Significant Other Type of Home: House  Primary Care Giver: Self (with assistance of spouse)  Patient Support Systems include: Spouse/Significant Other, Children   Current Financial resources: Medicare  Current community resources: None  Current services prior to admission: C-pap, Oxygen Therapy, Durable Medical Equipment ([employer] 02 baseline 8L x 24 hrs)            Current DME: Wheelchair, Oxygen Therapy (Comment), Cpap ([employer] 02 baseline 8L x 24 hrs)            Type of Home Care services:  None    ADLS  Prior functional level: Assistance with the following:, Bathing, Cooking, Housework, Shopping  Current functional level: Assistance with the following:, Bathing, Cooking, Housework, Shopping    PT AM-PAC:   /24  OT AM-PAC: 18 /24    Family can provide assistance at DC:  Yes  Would you like Case Management to discuss the discharge plan with any other family members/significant others, and if so, who?

## 2023-11-08 LAB
BASOPHILS # BLD: 0.1 K/UL (ref 0–0.2)
BASOPHILS NFR BLD: 0.7 %
DEPRECATED RDW RBC AUTO: 16.6 % (ref 12.4–15.4)
EOSINOPHIL # BLD: 0.3 K/UL (ref 0–0.6)
EOSINOPHIL NFR BLD: 2.2 %
GLUCOSE BLD-MCNC: 285 MG/DL (ref 70–99)
HCT VFR BLD AUTO: 29.7 % (ref 36–48)
HGB BLD-MCNC: 9.7 G/DL (ref 12–16)
LYMPHOCYTES # BLD: 3.1 K/UL (ref 1–5.1)
LYMPHOCYTES NFR BLD: 26.6 %
MCH RBC QN AUTO: 27.4 PG (ref 26–34)
MCHC RBC AUTO-ENTMCNC: 32.5 G/DL (ref 31–36)
MCV RBC AUTO: 84.2 FL (ref 80–100)
MONOCYTES # BLD: 0.7 K/UL (ref 0–1.3)
MONOCYTES NFR BLD: 5.8 %
NEUTROPHILS # BLD: 7.5 K/UL (ref 1.7–7.7)
NEUTROPHILS NFR BLD: 64.7 %
PERFORMED ON: ABNORMAL
PLATELET # BLD AUTO: 141 K/UL (ref 135–450)
PMV BLD AUTO: 7.2 FL (ref 5–10.5)
RBC # BLD AUTO: 3.53 M/UL (ref 4–5.2)
WBC # BLD AUTO: 11.6 K/UL (ref 4–11)

## 2023-11-08 PROCEDURE — 36415 COLL VENOUS BLD VENIPUNCTURE: CPT

## 2023-11-08 PROCEDURE — 6370000000 HC RX 637 (ALT 250 FOR IP): Performed by: INTERNAL MEDICINE

## 2023-11-08 PROCEDURE — 2000000000 HC ICU R&B

## 2023-11-08 PROCEDURE — 2700000000 HC OXYGEN THERAPY PER DAY

## 2023-11-08 PROCEDURE — 94660 CPAP INITIATION&MGMT: CPT

## 2023-11-08 PROCEDURE — 99233 SBSQ HOSP IP/OBS HIGH 50: CPT | Performed by: INTERNAL MEDICINE

## 2023-11-08 PROCEDURE — 6360000002 HC RX W HCPCS: Performed by: INTERNAL MEDICINE

## 2023-11-08 PROCEDURE — 99232 SBSQ HOSP IP/OBS MODERATE 35: CPT | Performed by: INTERNAL MEDICINE

## 2023-11-08 PROCEDURE — 94761 N-INVAS EAR/PLS OXIMETRY MLT: CPT

## 2023-11-08 PROCEDURE — 85025 COMPLETE CBC W/AUTO DIFF WBC: CPT

## 2023-11-08 PROCEDURE — 94640 AIRWAY INHALATION TREATMENT: CPT

## 2023-11-08 RX ORDER — IPRATROPIUM BROMIDE AND ALBUTEROL SULFATE 2.5; .5 MG/3ML; MG/3ML
1 SOLUTION RESPIRATORY (INHALATION)
Status: DISCONTINUED | OUTPATIENT
Start: 2023-11-08 | End: 2023-11-09 | Stop reason: HOSPADM

## 2023-11-08 RX ORDER — ENOXAPARIN SODIUM 100 MG/ML
40 INJECTION SUBCUTANEOUS DAILY
Status: DISCONTINUED | OUTPATIENT
Start: 2023-11-09 | End: 2023-11-09 | Stop reason: HOSPADM

## 2023-11-08 RX ADMIN — PIRFENIDONE 1 TABLET: 267 TABLET, FILM COATED ORAL at 20:08

## 2023-11-08 RX ADMIN — MIRTAZAPINE 30 MG: 15 TABLET, FILM COATED ORAL at 20:05

## 2023-11-08 RX ADMIN — PIRFENIDONE 1 TABLET: 267 TABLET, FILM COATED ORAL at 10:00

## 2023-11-08 RX ADMIN — ATORVASTATIN CALCIUM 40 MG: 40 TABLET, FILM COATED ORAL at 20:05

## 2023-11-08 RX ADMIN — GUAIFENESIN 600 MG: 600 TABLET, EXTENDED RELEASE ORAL at 09:59

## 2023-11-08 RX ADMIN — ENOXAPARIN SODIUM 30 MG: 100 INJECTION SUBCUTANEOUS at 10:09

## 2023-11-08 RX ADMIN — IPRATROPIUM BROMIDE AND ALBUTEROL SULFATE 1 DOSE: .5; 2.5 SOLUTION RESPIRATORY (INHALATION) at 19:41

## 2023-11-08 RX ADMIN — QUETIAPINE FUMARATE 12.5 MG: 25 TABLET ORAL at 09:58

## 2023-11-08 RX ADMIN — MUPIROCIN: 20 OINTMENT TOPICAL at 20:11

## 2023-11-08 RX ADMIN — IPRATROPIUM BROMIDE AND ALBUTEROL SULFATE 1 DOSE: .5; 2.5 SOLUTION RESPIRATORY (INHALATION) at 15:45

## 2023-11-08 RX ADMIN — SERTRALINE 200 MG: 100 TABLET, FILM COATED ORAL at 09:58

## 2023-11-08 RX ADMIN — LORAZEPAM 0.5 MG: 0.5 TABLET ORAL at 21:46

## 2023-11-08 RX ADMIN — LORAZEPAM 0.5 MG: 0.5 TABLET ORAL at 15:17

## 2023-11-08 RX ADMIN — NADOLOL 20 MG: 40 TABLET ORAL at 09:59

## 2023-11-08 RX ADMIN — PROMETHAZINE HYDROCHLORIDE 12.5 MG: 25 TABLET ORAL at 12:20

## 2023-11-08 RX ADMIN — GUAIFENESIN 600 MG: 600 TABLET, EXTENDED RELEASE ORAL at 20:05

## 2023-11-08 RX ADMIN — MUPIROCIN: 20 OINTMENT TOPICAL at 10:09

## 2023-11-08 RX ADMIN — Medication 3 MG: at 20:05

## 2023-11-08 RX ADMIN — QUETIAPINE FUMARATE 12.5 MG: 25 TABLET ORAL at 20:05

## 2023-11-08 RX ADMIN — DILTIAZEM HYDROCHLORIDE 240 MG: 120 CAPSULE, COATED, EXTENDED RELEASE ORAL at 09:59

## 2023-11-08 RX ADMIN — PREDNISONE 30 MG: 20 TABLET ORAL at 09:58

## 2023-11-08 RX ADMIN — FERROUS SULFATE TAB 325 MG (65 MG ELEMENTAL FE) 325 MG: 325 (65 FE) TAB at 09:59

## 2023-11-08 NOTE — PROGRESS NOTES
11/07/23 2348   NIV Type   NIV Started/Stopped On   Equipment Type V60   Mode Bilevel   Mask Type Full face mask   Mask Size Small   Assessment   Pulse 76   Respirations 23   SpO2 100 %   Skin Assessment Clean, dry, & intact   Settings/Measurements   PIP Observed 18 cm H20   IPAP 16 cmH20   CPAP/EPAP 8 cmH2O   Rate Ordered 607   Insp Rise Time (%) 1 %   FiO2  45 %   I Time/ I Time % 0.9 s   Minute Volume (L/min) 16.7 Liters   Mask Leak (lpm) 0 lpm   Patient's Home Machine No   Alarm Settings   Alarms On Y   Low Pressure (cmH2O) 8 cmH2O   High Pressure (cmH2O) 35 cmH2O   Delay Alarm 20 sec(s)   RR Low (bpm) 10   RR High (bpm) 50 br/min

## 2023-11-08 NOTE — PROGRESS NOTES
11/08/23 0341   NIV Type   Mode Bilevel   Mask Type Full face mask   Mask Size Small   Assessment   Respirations 30   SpO2 100 %   Settings/Measurements   IPAP 16 cmH20   CPAP/EPAP 8 cmH2O   Vt (Measured) 406 mL   Rate Ordered 16   FiO2  45 %

## 2023-11-08 NOTE — FLOWSHEET NOTE
11/07/23 2056   Assessment   Charting Type Shift assessment   Psychosocial   Psychosocial (WDL) WDL   Patient Behaviors Verbal  (calm and cooperative)   Neurological   Neuro (WDL) WDL   Level of Consciousness 0   Orientation Level Oriented X4   Cognition Appropriate judgement; Appropriate safety awareness; Appropriate attention/concentration; Appropriate for developmental age; Follows commands   Speech Clear; Appropriate for developmental age   Trevor Coma Scale   Eye Opening 4   Best Verbal Response 5   Best Motor Response 6   Trevor Coma Scale Score 15   HEENT (Head, Ears, Eyes, Nose, & Throat)   HEENT (WDL) X   Right Eye Glasses; Impaired vision   Left Eye Glasses; Impaired vision   Teeth Missing teeth   Respiratory   Respiratory (WDL) X   Subcutaneous Air/Crepitus None   Respiratory Pattern Tachypneic   Respiratory Depth Shallow   Respiratory Quality/Effort Accessory muscle use; Dyspnea with exertion;Orthopneic   Chest Assessment Chest expansion symmetrical;Trachea midline   L Breath Sounds Diminished   R Breath Sounds Diminished   Breath Sounds   Right Upper Lobe Diminished   Right Middle Lobe Diminished   Right Lower Lobe Diminished   Left Upper Lobe Diminished   Left Lower Lobe Diminished   Cough/Sputum   Cough Non-productive   Cardiac   Cardiac (WDL) WDL   Cardiac Regularity Regular   Cardiac Rhythm Sinus rhythm   Cardiac Monitor   [facility]   Alarm Audible Yes   Gastrointestinal   Abdominal (WDL) WDL   RUQ Bowel Sounds Active   LUQ Bowel Sounds Active   RLQ Bowel Sounds Active   LLQ Bowel Sounds Active   Abdomen Inspection Flat;Soft   Tenderness Soft; No guarding;Nontender; No rebound   Genitourinary   Genitourinary (WDL) X  (external catheter)   Flank Tenderness PAUL   Suprapubic Tenderness No   Dysuria (Pain/Burning w/Urination) No   Urine Assessment   Urine Color Yellow/straw   Urine Appearance Clear   Urine Odor PAUL   Peripheral Vascular   Peripheral Vascular (WDL) WDL   Edema None

## 2023-11-09 VITALS
DIASTOLIC BLOOD PRESSURE: 76 MMHG | TEMPERATURE: 98.4 F | HEART RATE: 68 BPM | OXYGEN SATURATION: 100 % | SYSTOLIC BLOOD PRESSURE: 104 MMHG | HEIGHT: 63 IN | BODY MASS INDEX: 24.73 KG/M2 | WEIGHT: 139.56 LBS | RESPIRATION RATE: 22 BRPM

## 2023-11-09 DIAGNOSIS — J84.9 ILD (INTERSTITIAL LUNG DISEASE) (HCC): ICD-10-CM

## 2023-11-09 DIAGNOSIS — J96.11 CHRONIC HYPOXEMIC RESPIRATORY FAILURE (HCC): ICD-10-CM

## 2023-11-09 DIAGNOSIS — R06.02 SOB (SHORTNESS OF BREATH): ICD-10-CM

## 2023-11-09 DIAGNOSIS — Z51.81 THERAPEUTIC DRUG MONITORING: ICD-10-CM

## 2023-11-09 LAB
ANION GAP SERPL CALCULATED.3IONS-SCNC: 6 MMOL/L (ref 3–16)
BUN SERPL-MCNC: 12 MG/DL (ref 7–20)
CALCIUM SERPL-MCNC: 8.6 MG/DL (ref 8.3–10.6)
CHLORIDE SERPL-SCNC: 101 MMOL/L (ref 99–110)
CO2 SERPL-SCNC: 34 MMOL/L (ref 21–32)
CREAT SERPL-MCNC: <0.5 MG/DL (ref 0.6–1.2)
GFR SERPLBLD CREATININE-BSD FMLA CKD-EPI: >60 ML/MIN/{1.73_M2}
GLUCOSE SERPL-MCNC: 104 MG/DL (ref 70–99)
POTASSIUM SERPL-SCNC: 3.7 MMOL/L (ref 3.5–5.1)
SODIUM SERPL-SCNC: 141 MMOL/L (ref 136–145)

## 2023-11-09 PROCEDURE — 99232 SBSQ HOSP IP/OBS MODERATE 35: CPT | Performed by: INTERNAL MEDICINE

## 2023-11-09 PROCEDURE — 99238 HOSP IP/OBS DSCHRG MGMT 30/<: CPT | Performed by: INTERNAL MEDICINE

## 2023-11-09 PROCEDURE — 6370000000 HC RX 637 (ALT 250 FOR IP): Performed by: INTERNAL MEDICINE

## 2023-11-09 PROCEDURE — 2700000000 HC OXYGEN THERAPY PER DAY

## 2023-11-09 PROCEDURE — 80048 BASIC METABOLIC PNL TOTAL CA: CPT

## 2023-11-09 PROCEDURE — 94660 CPAP INITIATION&MGMT: CPT

## 2023-11-09 PROCEDURE — 94640 AIRWAY INHALATION TREATMENT: CPT

## 2023-11-09 PROCEDURE — 6360000002 HC RX W HCPCS: Performed by: INTERNAL MEDICINE

## 2023-11-09 PROCEDURE — 94010 BREATHING CAPACITY TEST: CPT

## 2023-11-09 PROCEDURE — 36415 COLL VENOUS BLD VENIPUNCTURE: CPT

## 2023-11-09 PROCEDURE — 94761 N-INVAS EAR/PLS OXIMETRY MLT: CPT

## 2023-11-09 RX ORDER — PREDNISONE 10 MG/1
TABLET ORAL
Qty: 30 TABLET | Refills: 0
Start: 2023-11-09 | End: 2023-11-10

## 2023-11-09 RX ORDER — PREDNISONE 20 MG/1
20 TABLET ORAL DAILY
Status: DISCONTINUED | OUTPATIENT
Start: 2023-11-10 | End: 2023-11-09 | Stop reason: HOSPADM

## 2023-11-09 RX ADMIN — PANTOPRAZOLE SODIUM 40 MG: 40 TABLET, DELAYED RELEASE ORAL at 05:24

## 2023-11-09 RX ADMIN — QUETIAPINE FUMARATE 12.5 MG: 25 TABLET ORAL at 09:33

## 2023-11-09 RX ADMIN — PIRFENIDONE 1 TABLET: 267 TABLET, FILM COATED ORAL at 09:36

## 2023-11-09 RX ADMIN — NADOLOL 20 MG: 40 TABLET ORAL at 09:40

## 2023-11-09 RX ADMIN — IPRATROPIUM BROMIDE AND ALBUTEROL SULFATE 1 DOSE: .5; 2.5 SOLUTION RESPIRATORY (INHALATION) at 08:16

## 2023-11-09 RX ADMIN — GUAIFENESIN 600 MG: 600 TABLET, EXTENDED RELEASE ORAL at 09:33

## 2023-11-09 RX ADMIN — MUPIROCIN: 20 OINTMENT TOPICAL at 09:34

## 2023-11-09 RX ADMIN — FERROUS SULFATE TAB 325 MG (65 MG ELEMENTAL FE) 325 MG: 325 (65 FE) TAB at 09:33

## 2023-11-09 RX ADMIN — SERTRALINE 200 MG: 100 TABLET, FILM COATED ORAL at 09:33

## 2023-11-09 RX ADMIN — ONDANSETRON 4 MG: 2 INJECTION INTRAMUSCULAR; INTRAVENOUS at 10:10

## 2023-11-09 RX ADMIN — LORAZEPAM 0.5 MG: 0.5 TABLET ORAL at 09:40

## 2023-11-09 RX ADMIN — PREDNISONE 30 MG: 20 TABLET ORAL at 09:33

## 2023-11-09 RX ADMIN — ENOXAPARIN SODIUM 40 MG: 100 INJECTION SUBCUTANEOUS at 09:33

## 2023-11-09 RX ADMIN — DILTIAZEM HYDROCHLORIDE 240 MG: 120 CAPSULE, COATED, EXTENDED RELEASE ORAL at 09:33

## 2023-11-09 RX ADMIN — LEVOTHYROXINE SODIUM 100 MCG: 100 TABLET ORAL at 05:24

## 2023-11-09 ASSESSMENT — COPD QUESTIONNAIRES
QUESTION8_ENERGYLEVEL: 5
QUESTION1_COUGHFREQUENCY: 1
CAT_TOTALSCORE: 32
QUESTION3_CHESTTIGHTNESS: 5
QUESTION4_WALKINCLINE: 5
QUESTION6_LEAVINGHOUSE: 5
QUESTION5_HOMEACTIVITIES: 5
QUESTION2_CHESTPHLEGM: 1
QUESTION7_SLEEPQUALITY: 5

## 2023-11-09 NOTE — PROGRESS NOTES
Shift assessment completed, see flow sheet. Pt is lying in bed with eyes closed easily awakens. Pt is A/O. Pt denies any pain or change in SOB. Pt has NRB at bedside to use prn    SpO2 100%. Respirations are easy, even, and unlabored. Bilateral lung sounds diminished on 10 L.     VSS  NSR on the monitor       PIV, WNL     All lines and monitoring devices in place. External Cisneros is patent and secured. Bed in lowest position with wheels locked. No needs expressed at this time. Will continue to monitor.

## 2023-11-09 NOTE — DISCHARGE SUMMARY
Name:  Jae Ormond  Room:  3015/3015-01  MRN:    [id number]    Discharge Summary      This discharge summary is in conjunction with a complete physical exam done on the day of discharge. Discharging Physician: Tessa Lefort, MD      Admit: 11/6/2023  Discharge:  11/9/2023     Diagnoses this Admission    Principal Problem:    Acute exacerbation of chronic obstructive pulmonary disease (COPD) ([address])  Active Problems:    Severe protein-calorie malnutrition (HCC)    Paroxysmal atrial fibrillation ([address])  Resolved Problems:    * No resolved hospital problems. *          Procedures (Please Review Full Report for Details)      Consults    IP CONSULT TO PULMONOLOGY  IP CONSULT TO CRITICAL CARE  PULMONARY REHAB EVALUATION      HPI:  77 y.o. female who presented to [facility] with past medical history of atrial fibrillation, depression, COPD, type 2 diabetes, GERD, hypertension, hyperlipidemia, ILD presented to the ED with chief complaint of shortness of breath     Patient reported the patient has been having worsening shortness of breath progressively worsening after discharge from rehab. Patient reported that she slept and woke up worsening shortness of breath and has not been able to recover. Patient does have a that she has been for years in addition denied having any burning in also aromas no recent sickness. Patient does report she has chronic hypoxia but the oxygen continue to drop despite her baseline. Hospital Course    Acute on chronic hypoxic respiratory failure,  Suspected secondary to ILD/COPD   Pulmonary consulted, appreciated  Wean oxygen to baseline as tolerated-uses 4-8 L at baseline   Doing well today     acute COPD/ILD  exacerbation:  COPD initiated  DuoNebs, steroids     Pulm Hypertension     Paroxysmal A.  Fib  Not on Ac 2 to GI bleed    continue cardizem     Essential Hypertension: continue nadolol,cardizem     Hypothyroidism: Continue synthroid      Depression:

## 2023-11-09 NOTE — PROGRESS NOTES
11/09/23 0000   NIV Type   NIV Started/Stopped (S)  On   Equipment Type v60   Mode Bilevel   Mask Type Full face mask   Mask Size Small   Assessment   Pulse 79   Respirations 23   Settings/Measurements   IPAP 16 cmH20   CPAP/EPAP 8 cmH2O   Vt (Measured) 541 mL   Rate Ordered 16   FiO2  45 %   I Time/ I Time % 0.9 s   Minute Volume (L/min) 18.1 Liters   Mask Leak (lpm) 0 lpm   Patient's Home Machine No   Alarm Settings   Alarms On Y   Low Pressure (cmH2O) 8 cmH2O   High Pressure (cmH2O) 35 cmH2O   Delay Alarm 20 sec(s)   RR Low (bpm) 16   RR High (bpm) 50 br/min

## 2023-11-09 NOTE — PROGRESS NOTES
11/09/23 0325   NIV Type   $NIV $Daily Charge   NIV Started/Stopped On   Equipment Type v60   Mode Bilevel   Mask Type Full face mask   Mask Size Small   Assessment   Pulse 62   Respirations 19   SpO2 99 %   Settings/Measurements   IPAP 16 cmH20   CPAP/EPAP 8 cmH2O   Vt (Measured) 488 mL   Rate Ordered 16   FiO2  45 %   I Time/ I Time % 0.9 s   Minute Volume (L/min) 15.2 Liters   Mask Leak (lpm) 5 lpm   Patient's Home Machine No

## 2023-11-09 NOTE — PLAN OF CARE
HEART FAILURE CARE PLAN:    Comorbidities Reviewed: Yes   Patient has a past medical history of A-fib ([address]), Acute cystitis without hematuria, Anxiety, CHF (congestive heart failure) ([address]), COPD (chronic obstructive pulmonary disease) ([address]), Cryptogenic organizing pneumonia ([address]), Depression, Diabetes mellitus ([address]), GERD (gastroesophageal reflux disease), Hyperlipidemia, Hypertension, ILD (interstitial lung disease) ([address]), On home oxygen therapy, Rash, and Thyroid disease. ECHOCARDIOGRAM Reviewed: Yes   Patient's Ejection Fraction (EF) is greater than 40%    Weights Reviewed: Yes   Admission weight: 50.3 kg (111 lb)   Wt Readings from Last 3 Encounters:   11/06/23 50.3 kg (111 lb)   10/18/23 50.4 kg (111 lb 1.8 oz)   10/05/23 42.7 kg (94 lb 3.2 oz)     Intake & Output Reviewed: Yes     Intake/Output Summary (Last 24 hours) at 11/8/2023 0219  Last data filed at 11/7/2023 2055  Gross per 24 hour   Intake 965.76 ml   Output 325 ml   Net 640.76 ml     Medications Reviewed: Yes   SCHEDULED HOSPITAL MEDICATIONS:   mupirocin   Each Nostril BID    ipratropium 0.5 mg-albuterol 2.5 mg  1 Dose Inhalation 4x Daily RT    predniSONE  40 mg Oral Daily    nadolol  20 mg Oral Daily    atorvastatin  40 mg Oral Nightly    dilTIAZem  240 mg Oral Daily    ferrous sulfate  325 mg Oral Daily with breakfast    levothyroxine  100 mcg Oral Daily    pantoprazole  40 mg Oral QAM AC    Pirfenidone  1 tablet Oral TID    QUEtiapine  12.5 mg Oral BID    sertraline  200 mg Oral Daily    mirtazapine  30 mg Oral Nightly    enoxaparin  30 mg SubCUTAneous Daily    nicotine  1 patch TransDERmal Daily    melatonin  3 mg Oral Nightly    guaiFENesin  600 mg Oral BID     ACE/ARB/ARNI is REQUIRED for EF </= 78% SYSTOLIC FAILURE:   ACE[de-identified] None  ARB[de-identified] None  ARNI[de-identified] None    Evidenced-Based Beta Blocker is REQUIRED for EF </= 69% SYSTOLIC FAILURE:   [de-identified]  nadolol    Diuretics:  [de-identified] None    Diet Reviewed: Yes   ADULT DIET;  Regular  ADULT ORAL NUTRITION
HEART FAILURE CARE PLAN:    Comorbidities Reviewed: Yes   Patient has a past medical history of A-fib ([address]), Acute cystitis without hematuria, Anxiety, CHF (congestive heart failure) ([address]), COPD (chronic obstructive pulmonary disease) ([address]), Cryptogenic organizing pneumonia ([address]), Depression, Diabetes mellitus ([address]), GERD (gastroesophageal reflux disease), Hyperlipidemia, Hypertension, ILD (interstitial lung disease) ([address]), On home oxygen therapy, Rash, and Thyroid disease. ECHOCARDIOGRAM Reviewed: Yes   Patient's Ejection Fraction (EF) is greater than 40%    Weights Reviewed: Yes   Admission weight: 50.3 kg (111 lb)   Wt Readings from Last 3 Encounters:   11/09/23 63.3 kg (139 lb 9 oz)   10/18/23 50.4 kg (111 lb 1.8 oz)   10/05/23 42.7 kg (94 lb 3.2 oz)     Intake & Output Reviewed: Yes     Intake/Output Summary (Last 24 hours) at 11/9/2023 1134  Last data filed at 11/9/2023 0522  Gross per 24 hour   Intake 960 ml   Output 1075 ml   Net -115 ml     Medications Reviewed: Yes   SCHEDULED HOSPITAL MEDICATIONS:   [START ON 11/10/2023] predniSONE  20 mg Oral Daily    ipratropium 0.5 mg-albuterol 2.5 mg  1 Dose Inhalation TID RT    enoxaparin  40 mg SubCUTAneous Daily    mupirocin   Each Nostril BID    nadolol  20 mg Oral Daily    atorvastatin  40 mg Oral Nightly    dilTIAZem  240 mg Oral Daily    ferrous sulfate  325 mg Oral Daily with breakfast    levothyroxine  100 mcg Oral Daily    pantoprazole  40 mg Oral QAM AC    Pirfenidone  1 tablet Oral TID    QUEtiapine  12.5 mg Oral BID    sertraline  200 mg Oral Daily    mirtazapine  30 mg Oral Nightly    nicotine  1 patch TransDERmal Daily    melatonin  3 mg Oral Nightly    guaiFENesin  600 mg Oral BID     ACE/ARB/ARNI is REQUIRED for EF </= 62% SYSTOLIC FAILURE:   ACE[de-identified] None  ARB[de-identified] None  ARNI[de-identified] None    Evidenced-Based Beta Blocker is REQUIRED for EF </= 17% SYSTOLIC FAILURE:   [de-identified] None    Diuretics:  [de-identified] None    Diet Reviewed: Yes   ADULT DIET;  Regular  ADULT ORAL
Problem: Cardiovascular - Adult  Goal: Maintains optimal cardiac output and hemodynamic stability  Outcome: Progressing  Flowsheets (Taken 11/8/2023 0930 by Jaki Pinzon RN)  Maintains optimal cardiac output and hemodynamic stability: Monitor blood pressure and heart rate     Problem: Cardiovascular - Adult  Goal: Absence of cardiac dysrhythmias or at baseline  Outcome: Progressing
Problem: Discharge Planning  Goal: Discharge to home or other facility with appropriate resources  11/9/2023 1117 by Rosario Fisher RN  Outcome: Progressing  11/8/2023 2129 by Kathia Abreu RN  Outcome: Progressing     Problem: Skin/Tissue Integrity  Goal: Absence of new skin breakdown  Description: 1. Monitor for areas of redness and/or skin breakdown  2. Assess vascular access sites hourly  3. Every 4-6 hours minimum:  Change oxygen saturation probe site  4. Every 4-6 hours:  If on nasal continuous positive airway pressure, respiratory therapy assess nares and determine need for appliance change or resting period.   11/9/2023 1117 by Rosario Fisher RN  Outcome: Progressing  11/8/2023 2129 by Kathia Abreu RN  Outcome: Progressing     Problem: Respiratory - Adult  Goal: Achieves optimal ventilation and oxygenation  11/9/2023 1117 by Rosario Fisher RN  Outcome: Progressing  11/8/2023 2129 by Kathia Abreu RN  Outcome: Progressing
Problem: Discharge Planning  Goal: Discharge to home or other facility with appropriate resources  Outcome: Progressing     Problem: Safety - Adult  Goal: Free from fall injury  Outcome: Progressing     Problem: ABCDS Injury Assessment  Goal: Absence of physical injury  Outcome: Progressing     Problem: Skin/Tissue Integrity  Goal: Absence of new skin breakdown  Description: 1. Monitor for areas of redness and/or skin breakdown  2. Assess vascular access sites hourly  3. Every 4-6 hours minimum:  Change oxygen saturation probe site  4. Every 4-6 hours:  If on nasal continuous positive airway pressure, respiratory therapy assess nares and determine need for appliance change or resting period.   Outcome: Progressing     Problem: Respiratory - Adult  Goal: Achieves optimal ventilation and oxygenation  Outcome: Progressing     Problem: Chronic Conditions and Co-morbidities  Goal: Patient's chronic conditions and co-morbidity symptoms are monitored and maintained or improved  Outcome: Progressing     Problem: Pain  Goal: Verbalizes/displays adequate comfort level or baseline comfort level  Outcome: Progressing     Problem: Nutrition Deficit:  Goal: Optimize nutritional status  Outcome: Progressing
Problem: Discharge Planning  Goal: Discharge to home or other facility with appropriate resources  Outcome: Progressing  Flowsheets  Taken 11/6/2023 2200 by Maico Anders RN  Discharge to home or other facility with appropriate resources: Identify barriers to discharge with patient and caregiver  Taken 11/6/2023 2130 by Reena Nobles RN  Discharge to home or other facility with appropriate resources:   Identify barriers to discharge with patient and caregiver   Identify discharge learning needs (meds, wound care, etc)     Problem: Safety - Adult  Goal: Free from fall injury  Outcome: Progressing     Problem: ABCDS Injury Assessment  Goal: Absence of physical injury  Outcome: Progressing  Flowsheets (Taken 11/6/2023 2126 by Reena Nobles RN)  Absence of Physical Injury: Implement safety measures based on patient assessment     Problem: Skin/Tissue Integrity  Goal: Absence of new skin breakdown  Description: 1. Monitor for areas of redness and/or skin breakdown  2. Assess vascular access sites hourly  3. Every 4-6 hours minimum:  Change oxygen saturation probe site  4. Every 4-6 hours:  If on nasal continuous positive airway pressure, respiratory therapy assess nares and determine need for appliance change or resting period. Outcome: Progressing   No new skin breakdown noted  Problem: Respiratory - Adult  Goal: Achieves optimal ventilation and oxygenation  Outcome: Progressing   Denies respiratory difficulty.  SPO2 maintained in the 90's
Problem: Safety - Adult  Goal: Free from fall injury  Outcome: Progressing     Problem: Chronic Conditions and Co-morbidities  Goal: Patient's chronic conditions and co-morbidity symptoms are monitored and maintained or improved  Outcome: Progressing  Flowsheets (Taken 11/8/2023 0930)  Care Plan - Patient's Chronic Conditions and Co-Morbidity Symptoms are Monitored and Maintained or Improved: Monitor and assess patient's chronic conditions and comorbid symptoms for stability, deterioration, or improvement     Problem: Respiratory - Adult  Goal: Achieves optimal ventilation and oxygenation  Outcome: Adequate for Discharge  Flowsheets (Taken 11/8/2023 0930)  Achieves optimal ventilation and oxygenation:   Assess for changes in respiratory status   Assess for changes in mentation and behavior   Oxygen supplementation based on oxygen saturation or arterial blood gases   Position to facilitate oxygenation and minimize respiratory effort     Problem: Discharge Planning  Goal: Discharge to home or other facility with appropriate resources  Flowsheets  Taken 11/8/2023 1742  Discharge to home or other facility with appropriate resources:   Identify barriers to discharge with patient and caregiver   Arrange for needed discharge resources and transportation as appropriate   Identify discharge learning needs (meds, wound care, etc)  Taken 11/8/2023 0930  Discharge to home or other facility with appropriate resources: Identify barriers to discharge with patient and caregiver
SUPPLEMENT; Breakfast, Lunch, Dinner; Low Calorie/High Protein Oral Supplement    Goal of Care Reviewed: Yes   Patient and/or Family's stated Goal of Care this Admission: reduce shortness of breath, increase activity tolerance, better understand heart failure and disease management, be more comfortable, and reduce lower extremity edema prior to discharge.

## 2023-11-09 NOTE — CARE COORDINATION
DISCHARGE ORDER  Date/Time 2023 10:37 AM  Completed by: Ruba Nash, RN, Case Management    Patient Name: Nika Scott      : 1955  Admitting Diagnosis: Acute exacerbation of chronic obstructive pulmonary disease (COPD) ([address]) [J44.1]  Acute on chronic respiratory failure with hypoxia and hypercapnia ([address]) [F71.87, J96.22]      Admit order Date and Status:23  (verify MD's last order for status of admission)      Noted discharge order. If applicable PT/OT recommendation at Discharge: Home with 24 H assist and home OT/PT. Patient will go home with [facility]. DME recommendation by PT/OT:  Confirmed discharge plan: Yes  with whom the patient. If pt confirmed DC plan does family need to be contacted by CM No if yes who - spouse at bedside. Discharge Plan: Patient discharging to home today. Date of Last IMM Given: 23    Reviewed chart. Role of discharge planner explained and patient verbalized understanding. Discharge order is noted. Has Home O2 in place on admit:  Yes  Informed of need to bring portable home O2 tank on day of discharge for nursing to connect prior to leaving:   Yes  Verbalized agreement/Understanding:   Yes  Pt is being d/c'd to home today. Pt's O2 sats are 100% on 10L. Discharge timeout done with [facility] RN. All discharge needs and concerns addressed.

## 2023-11-09 NOTE — PROGRESS NOTES
Rounding completed with Dr. Baljeet Olivas and interdisciplinary team.  POC, labs, lines and assessment reviewed.      -plan to DC home today.

## 2023-11-09 NOTE — FLOWSHEET NOTE
Regular   Heart Sounds S1, S2   Cardiac Rhythm Sinus rhythm   Cardiac Monitor   Telemetry Box Number Bedside ICU 15   Alarm Audible Yes   Telemetry Monitor Alarm Parameters    Gastrointestinal   Abdominal (WDL) WDL   RUQ Bowel Sounds Active   LUQ Bowel Sounds Active   RLQ Bowel Sounds Active   LLQ Bowel Sounds Active   Abdomen Inspection Flat;Soft   GI Symptoms Loss of appetite   Tenderness Soft;Nontender   Constipation Precipitating Factors Medication (iron supplements, opiods, antidepressants etc)   Genitourinary   Genitourinary (WDL) X  (purewick)   Flank Tenderness PAUL   Suprapubic Tenderness No   Dysuria (Pain/Burning w/Urination) No   Urine Assessment   Urine Color Yellow/straw   Urine Appearance Clear   Peripheral Vascular   Peripheral Vascular (WDL) WDL   Edema None   Skin Integumentary    Skin Integumentary (WDL) X   Skin Color Pale   Skin Condition/Temp Dry; Warm   Skin Integrity Ecchymosis; Redness   Location scattered, coccyx   Skin Fold Management No   Multiple Skin Integrity Sites No   Skin Integrity Site 2   Skin Integrity Location 2 Redness   Location 2 coccyx   Preventative Dressing Yes   Dressing Site Sacrum   Date Applied 11/07/23   Assessed this shift Yes   Wound Prevention and Protection Methods   Location of Wound Prevention Coccyx   Orientation of Wound Prevention Mid   Dressing Present  Yes   Skin Assessed Underneath Dressing This Shift Yes   Wound Offloading (Prevention Methods) Bed, pressure redistribution/air; Turning;Elevate heels; Foam silicone;Repositioning;Pillows   Musculoskeletal   Musculoskeletal (WDL) X   RL Extremity Weakness; Unsteady   LL Extremity Weakness; Unsteady   Shift assessment completed. Patient awake in bed. A/Ox4. See flowsheet for vitals. Scheduled PM medications given per MAR. Nasal canula in place with Non rebreather mask at 15L. No s/s of distress. Patient reminded to reposition. Patient denies pain. No additional needs noted at this time.  Call light and bedside table within reach.

## 2023-11-09 NOTE — PROGRESS NOTES
Patient okay for discharge per MD. Discharge instructions given. IV removed and site assessment clean, dry, and intact. Telebox removed  pt given home medications from drawer to . Patient discharged home in stable conditions with all belongings including cell phone. No questions or concerns at this time. Patient escorted to personal car on 8L with NRB.  Pt tolerated well

## 2023-11-09 NOTE — DISCHARGE INSTR - DIET

## 2023-11-09 NOTE — DISCHARGE INSTR - COC
Continuity of Care Form    Patient Name: Kaz Westbrook   :  1955  MRN:  [id number]    Admit date:  2023  Discharge date:  23    Code Status Order: Full Code   Advance Directives:     Admitting Physician:  Thea Cuadra DO  PCP: Claudette Bloch, MD    Discharging Nurse: [facility] Unit/Room#: 1875/1066-93  Discharging Unit Phone Number: ***    Emergency Contact:   Extended Emergency Contact Information  Primary Emergency Contact: Alyse López  Address: [address], [address] [place] [address] [address] Siddiqui Phone: [phone]  Mobile Phone: [phone]  Relation: Spouse  Secondary Emergency Contact: KulwantBhakti  Address: [address]           Dyana Hernandez, [address] Phone: [phone]  Mobile Phone: [phone]  Relation: Child    Past Surgical History:  Past Surgical History:   Procedure Laterality Date    ARM SURGERY Left 3/2/2020    LEFT ULNAR NERVE DECOMPRESSION AT THE ELBOW performed by Melany Short MD at [address] [facility] [address] N/A 2019    EBUS WF W/ANES.  performed by Lars Sharpe MD at [address] [place] [address]  2019    BRONCHOSCOPY/TRANSBRONCHIAL NEEDLE BIOPSY performed by Lars Sharpe MD at [address] [place] [address]  2019    BRONCHOSCOPY/TRANSBRONCHIAL LUNG BIOPSY performed by Lars Sharpe MD at [address] [place] [address]  2019    BRONCHOSCOPY ALVEOLAR LAVAGE performed by Lars Sharpe MD at [address] [place] [address]  07/10/2019    BRONCHOSCOPY N/A 7/10/2019    BRONCHOSCOPY ALVEOLAR LAVAGE performed by Coty Eduardo MD at [address] [place] [address] Bilateral 2019    BRONCHOSCOPY N/A 2019    BRONCHOSCOPY ALVEOLAR LAVAGE performed by Brooks Carlisle MD at [address] [place] [address] N/A 2019    BRONCHOSCOPY ALVEOLAR LAVAGE performed by Nito Sr MD at [address] [place] [address] N/A 2022 C05.932    Type 2 diabetes mellitus without complication ([facility]) M34.2    Hyponatremia E87.1    Numbness and tingling in left hand R20.0, R20.2    Ulnar neuropathy at elbow of left upper extremity G56.22    Left wrist pain M25.532    Left wrist tendinitis M77.8    GERD (gastroesophageal reflux disease) Z62.4    Toxic metabolic encephalopathy Y90.2    VIRGINIE (acute kidney injury) ([address]) N17.9    Lumbar radiculopathy M54.16    Rib pain on left side R07.81    Elevated lactic acid level R79.89    Fracture of multiple ribs of left side S22.42XA    Hypomagnesemia E83.42    Depression F32. A    Chronic diastolic congestive heart failure ([facility]) I50.32    Burst fracture of lumbar vertebra ([address]) S32.001A    H/O Spinal surgery Z98.890    Severe protein-calorie malnutrition ([facility]) E43    Pneumonia due to COVID-19 virus U07.1, J12.82    COVID-19 U07.1    Diarrhea R19.7    PSVT (paroxysmal supraventricular tachycardia) I47.10    PAF (paroxysmal atrial fibrillation) ([facility]) I48.0    SVT (supraventricular tachycardia) I47.10    Dependence on supplemental oxygen Z99.81    Atrial fibrillation with RVR ([facility]) I48.91    Chronic hypoxemic respiratory failure ([facility]) J96.11    History of COPD Z87.09    Acute on chronic respiratory failure with hypoxia and hypercapnia ([facility]) J96.21, J96.22    Acute on chronic respiratory failure with hypoxia ([facility]) J96.21    Acute respiratory failure with hypoxia ([facility]) J96.01    Interstitial lung disease ([facility]) J84.9    Acute and chr resp failure, unsp w hypoxia or hypercapnia ([facility]) J96.20    Respiratory failure ([facility]) J96.90    Absolute anemia D64.9    Steroid-induced hyperglycemia R73.9, T38.0X5A    Acute respiratory failure ([facility]) J96.00    Hypernatremia E87.0    Weakness R53.1    Pulmonary fibrosis ([facility]) J84.10    Acute pain of right shoulder M25.511    S/P chest tube placement ([facility]) Z93.8    Combined hyperlipidemia associated with type 2 diabetes mellitus ([facility]) E11.69, E78.2    Coagulation defect ([facility]) D68.9    Chronic

## 2023-11-10 ENCOUNTER — CARE COORDINATION (OUTPATIENT)
Dept: CASE MANAGEMENT | Age: 68
End: 2023-11-10

## 2023-11-10 LAB
BACTERIA BLD CULT ORG #2: NORMAL
BACTERIA BLD CULT: NORMAL

## 2023-11-10 RX ORDER — PREDNISONE 10 MG/1
TABLET ORAL
Qty: 30 TABLET | Refills: 0 | Status: ON HOLD | OUTPATIENT
Start: 2023-11-10

## 2023-11-10 NOTE — CARE COORDINATION
Care Transitions Initial Follow Up Call    Call within 2 business days of discharge: Yes      Patient: Cheryle Nine Patient : 1955   MRN: [id number]  Reason for Admission: 3 days -> acute on chronic hypoxic resp failure suspected 2/2 ILD/COPD exac, pulm hypertension, paroxysmal A Fib-not on AC 2/2 recurrent GIB, essential HTN, hypothyroidism, depression, recent severe epistaxis, recent COVID and Influenza A -> home with [facility], AM-PAC 10, [facility] Eusebia Degroot NP), Passport (dtr is [facility]), O2 baseline between 4-8L, AVAPS vs BiPAP at home, chronic pred 10mg, recently at [facility] 10/18-  Discharge Date: 23 RARS: Readmission Risk Score: 42.6      Last Discharge Facility       Date Complaint Diagnosis Description Type Department Provider    23 Shortness of Breath Acute on chronic respiratory failure with hypoxia and hypercapnia ([address]) . .. ED to Hosp-Admission (Discharged) (ADMITTED) Aurea Velazquez MD; Diamond Cárdenas. .. CTN attempted follow-up outreach to patient. Message left including CTN contact information. CTN contacted [facility] and confirmed referral. Per [facility], they had note in her chart for tentative DC home  but were not notified. She transferred call to intake. Spoke with Misti in intake who confirms they have what they need and patient should be contacted and resumed by nursing today. Is follow up appointment scheduled within 7 days of discharge? Yes.     Follow Up  Future Appointments   Date Time Provider [address] [place] [address] Ct   2023  2:15 PM Anna Manzanares MD [facility] Remy Asher RN

## 2023-11-11 ENCOUNTER — APPOINTMENT (OUTPATIENT)
Dept: GENERAL RADIOLOGY | Age: 68
DRG: 196 | End: 2023-11-11
Attending: STUDENT IN AN ORGANIZED HEALTH CARE EDUCATION/TRAINING PROGRAM
Payer: MEDICARE

## 2023-11-11 ENCOUNTER — HOSPITAL ENCOUNTER (INPATIENT)
Age: 68
LOS: 2 days | Discharge: HOME | DRG: 196 | End: 2023-11-13
Attending: STUDENT IN AN ORGANIZED HEALTH CARE EDUCATION/TRAINING PROGRAM | Admitting: INTERNAL MEDICINE
Payer: MEDICARE

## 2023-11-11 DIAGNOSIS — J96.21 ACUTE ON CHRONIC RESPIRATORY FAILURE WITH HYPOXIA (HCC): Primary | ICD-10-CM

## 2023-11-11 PROBLEM — J10.1 INFLUENZA A: Status: RESOLVED | Noted: 2023-10-12 | Resolved: 2023-11-11

## 2023-11-11 LAB
ALBUMIN SERPL-MCNC: 3.9 G/DL (ref 3.4–5)
ALBUMIN/GLOB SERPL: 1.7 {RATIO} (ref 1.1–2.2)
ALP SERPL-CCNC: 117 U/L (ref 40–129)
ALT SERPL-CCNC: 126 U/L (ref 10–40)
ANION GAP SERPL CALCULATED.3IONS-SCNC: 9 MMOL/L (ref 3–16)
AST SERPL-CCNC: 188 U/L (ref 15–37)
BASE EXCESS BLDV CALC-SCNC: 4.6 MMOL/L (ref -3–3)
BASOPHILS # BLD: 0 K/UL (ref 0–0.2)
BASOPHILS NFR BLD: 0 %
BILIRUB SERPL-MCNC: 0.3 MG/DL (ref 0–1)
BUN SERPL-MCNC: 13 MG/DL (ref 7–20)
CALCIUM SERPL-MCNC: 9.1 MG/DL (ref 8.3–10.6)
CHLORIDE SERPL-SCNC: 105 MMOL/L (ref 99–110)
CO2 BLDV-SCNC: 30 MMOL/L
CO2 SERPL-SCNC: 33 MMOL/L (ref 21–32)
COHGB MFR BLDV: 1.8 % (ref 0–1.5)
CREAT SERPL-MCNC: 0.8 MG/DL (ref 0.6–1.2)
DEPRECATED RDW RBC AUTO: 16.6 % (ref 12.4–15.4)
EKG ATRIAL RATE: 92 BPM
EKG DIAGNOSIS: NORMAL
EKG P AXIS: 8 DEGREES
EKG P-R INTERVAL: 140 MS
EKG Q-T INTERVAL: 342 MS
EKG QRS DURATION: 70 MS
EKG QTC CALCULATION (BAZETT): 422 MS
EKG R AXIS: 16 DEGREES
EKG T AXIS: -1 DEGREES
EKG VENTRICULAR RATE: 92 BPM
EOSINOPHIL # BLD: 0.1 K/UL (ref 0–0.6)
EOSINOPHIL NFR BLD: 1 %
FLUAV RNA UPPER RESP QL NAA+PROBE: NEGATIVE
FLUBV AG NPH QL: NEGATIVE
GFR SERPLBLD CREATININE-BSD FMLA CKD-EPI: >60 ML/MIN/{1.73_M2}
GLUCOSE BLD-MCNC: 197 MG/DL (ref 70–99)
GLUCOSE BLD-MCNC: 227 MG/DL (ref 70–99)
GLUCOSE BLD-MCNC: 241 MG/DL (ref 70–99)
GLUCOSE BLD-MCNC: 283 MG/DL (ref 70–99)
GLUCOSE SERPL-MCNC: 130 MG/DL (ref 70–99)
HCO3 BLDV-SCNC: 28.4 MMOL/L (ref 23–29)
HCT VFR BLD AUTO: 33.7 % (ref 36–48)
HGB BLD-MCNC: 11 G/DL (ref 12–16)
LYMPHOCYTES # BLD: 2.9 K/UL (ref 1–5.1)
LYMPHOCYTES NFR BLD: 21 %
MCH RBC QN AUTO: 27.2 PG (ref 26–34)
MCHC RBC AUTO-ENTMCNC: 32.6 G/DL (ref 31–36)
MCV RBC AUTO: 83.4 FL (ref 80–100)
METHGB MFR BLDV: 0.3 %
MONOCYTES # BLD: 0.3 K/UL (ref 0–1.3)
MONOCYTES NFR BLD: 2 %
NEUTROPHILS # BLD: 10.5 K/UL (ref 1.7–7.7)
NEUTROPHILS NFR BLD: 76 %
O2 THERAPY: ABNORMAL
PCO2 BLDV: 39.1 MMHG (ref 40–50)
PERFORMED ON: ABNORMAL
PH BLDV: 7.48 [PH] (ref 7.35–7.45)
PLATELET # BLD AUTO: 141 K/UL (ref 135–450)
PLATELET BLD QL SMEAR: ADEQUATE
PMV BLD AUTO: 7.5 FL (ref 5–10.5)
PO2 BLDV: 40.5 MMHG (ref 25–40)
POTASSIUM SERPL-SCNC: 4.7 MMOL/L (ref 3.5–5.1)
PROT SERPL-MCNC: 6.2 G/DL (ref 6.4–8.2)
RBC # BLD AUTO: 4.04 M/UL (ref 4–5.2)
SAO2 % BLDV: 80 %
SARS-COV-2 RDRP RESP QL NAA+PROBE: NOT DETECTED
SLIDE REVIEW: ABNORMAL
SODIUM SERPL-SCNC: 147 MMOL/L (ref 136–145)
TROPONIN, HIGH SENSITIVITY: 21 NG/L (ref 0–14)
TROPONIN, HIGH SENSITIVITY: 21 NG/L (ref 0–14)
WBC # BLD AUTO: 13.8 K/UL (ref 4–11)

## 2023-11-11 PROCEDURE — 87804 INFLUENZA ASSAY W/OPTIC: CPT

## 2023-11-11 PROCEDURE — 80053 COMPREHEN METABOLIC PANEL: CPT

## 2023-11-11 PROCEDURE — 2700000000 HC OXYGEN THERAPY PER DAY

## 2023-11-11 PROCEDURE — G0480 DRUG TEST DEF 1-7 CLASSES: HCPCS

## 2023-11-11 PROCEDURE — 2580000003 HC RX 258: Performed by: INTERNAL MEDICINE

## 2023-11-11 PROCEDURE — 6370000000 HC RX 637 (ALT 250 FOR IP): Performed by: INTERNAL MEDICINE

## 2023-11-11 PROCEDURE — 99285 EMERGENCY DEPT VISIT HI MDM: CPT

## 2023-11-11 PROCEDURE — 94761 N-INVAS EAR/PLS OXIMETRY MLT: CPT

## 2023-11-11 PROCEDURE — 94640 AIRWAY INHALATION TREATMENT: CPT

## 2023-11-11 PROCEDURE — 71045 X-RAY EXAM CHEST 1 VIEW: CPT

## 2023-11-11 PROCEDURE — 6370000000 HC RX 637 (ALT 250 FOR IP): Performed by: STUDENT IN AN ORGANIZED HEALTH CARE EDUCATION/TRAINING PROGRAM

## 2023-11-11 PROCEDURE — 94660 CPAP INITIATION&MGMT: CPT

## 2023-11-11 PROCEDURE — 99223 1ST HOSP IP/OBS HIGH 75: CPT | Performed by: INTERNAL MEDICINE

## 2023-11-11 PROCEDURE — 2580000003 HC RX 258: Performed by: STUDENT IN AN ORGANIZED HEALTH CARE EDUCATION/TRAINING PROGRAM

## 2023-11-11 PROCEDURE — 84484 ASSAY OF TROPONIN QUANT: CPT

## 2023-11-11 PROCEDURE — 2000000000 HC ICU R&B

## 2023-11-11 PROCEDURE — 85025 COMPLETE CBC W/AUTO DIFF WBC: CPT

## 2023-11-11 PROCEDURE — 82803 BLOOD GASES ANY COMBINATION: CPT

## 2023-11-11 PROCEDURE — 96374 THER/PROPH/DIAG INJ IV PUSH: CPT

## 2023-11-11 PROCEDURE — 93005 ELECTROCARDIOGRAM TRACING: CPT | Performed by: STUDENT IN AN ORGANIZED HEALTH CARE EDUCATION/TRAINING PROGRAM

## 2023-11-11 PROCEDURE — 5A09357 ASSISTANCE WITH RESPIRATORY VENTILATION, LESS THAN 24 CONSECUTIVE HOURS, CONTINUOUS POSITIVE AIRWAY PRESSURE: ICD-10-PCS | Performed by: INTERNAL MEDICINE

## 2023-11-11 PROCEDURE — 6360000002 HC RX W HCPCS: Performed by: STUDENT IN AN ORGANIZED HEALTH CARE EDUCATION/TRAINING PROGRAM

## 2023-11-11 PROCEDURE — 93010 ELECTROCARDIOGRAM REPORT: CPT | Performed by: INTERNAL MEDICINE

## 2023-11-11 PROCEDURE — 6360000002 HC RX W HCPCS: Performed by: INTERNAL MEDICINE

## 2023-11-11 PROCEDURE — 36415 COLL VENOUS BLD VENIPUNCTURE: CPT

## 2023-11-11 PROCEDURE — 87635 SARS-COV-2 COVID-19 AMP PRB: CPT

## 2023-11-11 RX ORDER — LEVOTHYROXINE SODIUM 0.1 MG/1
100 TABLET ORAL DAILY
Status: DISCONTINUED | OUTPATIENT
Start: 2023-11-11 | End: 2023-11-13 | Stop reason: HOSPADM

## 2023-11-11 RX ORDER — IPRATROPIUM BROMIDE AND ALBUTEROL SULFATE 2.5; .5 MG/3ML; MG/3ML
1 SOLUTION RESPIRATORY (INHALATION) EVERY 4 HOURS PRN
Status: DISCONTINUED | OUTPATIENT
Start: 2023-11-11 | End: 2023-11-13 | Stop reason: HOSPADM

## 2023-11-11 RX ORDER — INSULIN LISPRO 100 [IU]/ML
0-8 INJECTION, SOLUTION INTRAVENOUS; SUBCUTANEOUS
Status: DISCONTINUED | OUTPATIENT
Start: 2023-11-11 | End: 2023-11-13 | Stop reason: HOSPADM

## 2023-11-11 RX ORDER — INSULIN LISPRO 100 [IU]/ML
0-4 INJECTION, SOLUTION INTRAVENOUS; SUBCUTANEOUS NIGHTLY
Status: DISCONTINUED | OUTPATIENT
Start: 2023-11-11 | End: 2023-11-13 | Stop reason: HOSPADM

## 2023-11-11 RX ORDER — MIRTAZAPINE 15 MG/1
30 TABLET, FILM COATED ORAL NIGHTLY
Status: DISCONTINUED | OUTPATIENT
Start: 2023-11-11 | End: 2023-11-13 | Stop reason: HOSPADM

## 2023-11-11 RX ORDER — LORAZEPAM 0.5 MG/1
0.5 TABLET ORAL EVERY 6 HOURS PRN
Status: DISCONTINUED | OUTPATIENT
Start: 2023-11-11 | End: 2023-11-13 | Stop reason: HOSPADM

## 2023-11-11 RX ORDER — ATORVASTATIN CALCIUM 40 MG/1
40 TABLET, FILM COATED ORAL NIGHTLY
Status: DISCONTINUED | OUTPATIENT
Start: 2023-11-11 | End: 2023-11-13 | Stop reason: HOSPADM

## 2023-11-11 RX ORDER — PIRFENIDONE 267 MG/1
267 TABLET, FILM COATED ORAL 2 TIMES DAILY
Status: DISCONTINUED | OUTPATIENT
Start: 2023-11-11 | End: 2023-11-13 | Stop reason: HOSPADM

## 2023-11-11 RX ORDER — IPRATROPIUM BROMIDE AND ALBUTEROL SULFATE 2.5; .5 MG/3ML; MG/3ML
2 SOLUTION RESPIRATORY (INHALATION) ONCE
Status: COMPLETED | OUTPATIENT
Start: 2023-11-11 | End: 2023-11-11

## 2023-11-11 RX ORDER — DILTIAZEM HYDROCHLORIDE 120 MG/1
240 CAPSULE, COATED, EXTENDED RELEASE ORAL DAILY
Status: DISCONTINUED | OUTPATIENT
Start: 2023-11-11 | End: 2023-11-13 | Stop reason: HOSPADM

## 2023-11-11 RX ORDER — PIRFENIDONE 267 MG/1
1 TABLET, FILM COATED ORAL 3 TIMES DAILY
Status: DISCONTINUED | OUTPATIENT
Start: 2023-11-11 | End: 2023-11-11

## 2023-11-11 RX ORDER — ENOXAPARIN SODIUM 100 MG/ML
40 INJECTION SUBCUTANEOUS DAILY
Status: DISCONTINUED | OUTPATIENT
Start: 2023-11-11 | End: 2023-11-13 | Stop reason: HOSPADM

## 2023-11-11 RX ORDER — POLYETHYLENE GLYCOL 3350 17 G/17G
17 POWDER, FOR SOLUTION ORAL DAILY PRN
Status: DISCONTINUED | OUTPATIENT
Start: 2023-11-11 | End: 2023-11-13 | Stop reason: HOSPADM

## 2023-11-11 RX ORDER — TRAMADOL HYDROCHLORIDE 50 MG/1
50 TABLET ORAL EVERY 6 HOURS PRN
Status: DISCONTINUED | OUTPATIENT
Start: 2023-11-11 | End: 2023-11-13 | Stop reason: HOSPADM

## 2023-11-11 RX ORDER — NADOLOL 40 MG/1
20 TABLET ORAL DAILY
Status: DISCONTINUED | OUTPATIENT
Start: 2023-11-11 | End: 2023-11-13 | Stop reason: HOSPADM

## 2023-11-11 RX ORDER — IPRATROPIUM BROMIDE AND ALBUTEROL SULFATE 2.5; .5 MG/3ML; MG/3ML
1 SOLUTION RESPIRATORY (INHALATION)
Status: DISCONTINUED | OUTPATIENT
Start: 2023-11-11 | End: 2023-11-12

## 2023-11-11 RX ORDER — SERTRALINE HYDROCHLORIDE 100 MG/1
200 TABLET, FILM COATED ORAL DAILY
Status: DISCONTINUED | OUTPATIENT
Start: 2023-11-11 | End: 2023-11-13 | Stop reason: HOSPADM

## 2023-11-11 RX ORDER — ACETAMINOPHEN 325 MG/1
650 TABLET ORAL EVERY 6 HOURS PRN
Status: DISCONTINUED | OUTPATIENT
Start: 2023-11-11 | End: 2023-11-13 | Stop reason: HOSPADM

## 2023-11-11 RX ORDER — IPRATROPIUM BROMIDE AND ALBUTEROL SULFATE 2.5; .5 MG/3ML; MG/3ML
1 SOLUTION RESPIRATORY (INHALATION) ONCE
Status: COMPLETED | OUTPATIENT
Start: 2023-11-11 | End: 2023-11-11

## 2023-11-11 RX ORDER — SODIUM CHLORIDE 0.9 % (FLUSH) 0.9 %
5-40 SYRINGE (ML) INJECTION EVERY 12 HOURS SCHEDULED
Status: DISCONTINUED | OUTPATIENT
Start: 2023-11-11 | End: 2023-11-13 | Stop reason: HOSPADM

## 2023-11-11 RX ORDER — PREDNISONE 20 MG/1
40 TABLET ORAL DAILY
Status: DISCONTINUED | OUTPATIENT
Start: 2023-11-11 | End: 2023-11-11

## 2023-11-11 RX ORDER — ONDANSETRON 2 MG/ML
4 INJECTION INTRAMUSCULAR; INTRAVENOUS EVERY 6 HOURS PRN
Status: DISCONTINUED | OUTPATIENT
Start: 2023-11-11 | End: 2023-11-13 | Stop reason: HOSPADM

## 2023-11-11 RX ORDER — ACETAMINOPHEN 650 MG/1
650 SUPPOSITORY RECTAL EVERY 6 HOURS PRN
Status: DISCONTINUED | OUTPATIENT
Start: 2023-11-11 | End: 2023-11-13 | Stop reason: HOSPADM

## 2023-11-11 RX ORDER — SODIUM CHLORIDE 9 MG/ML
INJECTION, SOLUTION INTRAVENOUS PRN
Status: DISCONTINUED | OUTPATIENT
Start: 2023-11-11 | End: 2023-11-13 | Stop reason: HOSPADM

## 2023-11-11 RX ORDER — GUAIFENESIN 600 MG/1
600 TABLET, EXTENDED RELEASE ORAL 2 TIMES DAILY
Status: DISCONTINUED | OUTPATIENT
Start: 2023-11-11 | End: 2023-11-13 | Stop reason: HOSPADM

## 2023-11-11 RX ORDER — IPRATROPIUM BROMIDE AND ALBUTEROL SULFATE 2.5; .5 MG/3ML; MG/3ML
1 SOLUTION RESPIRATORY (INHALATION)
Status: DISCONTINUED | OUTPATIENT
Start: 2023-11-11 | End: 2023-11-11

## 2023-11-11 RX ORDER — SODIUM CHLORIDE 0.9 % (FLUSH) 0.9 %
5-40 SYRINGE (ML) INJECTION PRN
Status: DISCONTINUED | OUTPATIENT
Start: 2023-11-11 | End: 2023-11-13 | Stop reason: HOSPADM

## 2023-11-11 RX ORDER — HYDROXYZINE HYDROCHLORIDE 10 MG/1
10 TABLET, FILM COATED ORAL 3 TIMES DAILY PRN
Status: DISCONTINUED | OUTPATIENT
Start: 2023-11-11 | End: 2023-11-13 | Stop reason: HOSPADM

## 2023-11-11 RX ORDER — ONDANSETRON 4 MG/1
4 TABLET, ORALLY DISINTEGRATING ORAL EVERY 8 HOURS PRN
Status: DISCONTINUED | OUTPATIENT
Start: 2023-11-11 | End: 2023-11-13 | Stop reason: HOSPADM

## 2023-11-11 RX ORDER — PANTOPRAZOLE SODIUM 40 MG/1
40 TABLET, DELAYED RELEASE ORAL
Status: DISCONTINUED | OUTPATIENT
Start: 2023-11-11 | End: 2023-11-13 | Stop reason: HOSPADM

## 2023-11-11 RX ADMIN — ENOXAPARIN SODIUM 40 MG: 100 INJECTION SUBCUTANEOUS at 13:06

## 2023-11-11 RX ADMIN — LORAZEPAM 0.5 MG: 0.5 TABLET ORAL at 13:14

## 2023-11-11 RX ADMIN — IPRATROPIUM BROMIDE AND ALBUTEROL SULFATE 2 DOSE: .5; 2.5 SOLUTION RESPIRATORY (INHALATION) at 05:15

## 2023-11-11 RX ADMIN — GUAIFENESIN 600 MG: 600 TABLET, EXTENDED RELEASE ORAL at 20:28

## 2023-11-11 RX ADMIN — NADOLOL 20 MG: 40 TABLET ORAL at 13:28

## 2023-11-11 RX ADMIN — IPRATROPIUM BROMIDE AND ALBUTEROL SULFATE 1 DOSE: .5; 2.5 SOLUTION RESPIRATORY (INHALATION) at 19:37

## 2023-11-11 RX ADMIN — HYDROXYZINE HYDROCHLORIDE 10 MG: 10 TABLET ORAL at 13:07

## 2023-11-11 RX ADMIN — PIRFENIDONE 267 MG: 267 TABLET, FILM COATED ORAL at 14:03

## 2023-11-11 RX ADMIN — GUAIFENESIN 600 MG: 600 TABLET, EXTENDED RELEASE ORAL at 13:07

## 2023-11-11 RX ADMIN — LEVOTHYROXINE SODIUM 100 MCG: 100 TABLET ORAL at 13:07

## 2023-11-11 RX ADMIN — SERTRALINE 200 MG: 100 TABLET, FILM COATED ORAL at 14:03

## 2023-11-11 RX ADMIN — IPRATROPIUM BROMIDE AND ALBUTEROL SULFATE 1 DOSE: .5; 2.5 SOLUTION RESPIRATORY (INHALATION) at 06:08

## 2023-11-11 RX ADMIN — IPRATROPIUM BROMIDE AND ALBUTEROL SULFATE 1 DOSE: .5; 2.5 SOLUTION RESPIRATORY (INHALATION) at 23:18

## 2023-11-11 RX ADMIN — ATORVASTATIN CALCIUM 40 MG: 40 TABLET, FILM COATED ORAL at 20:28

## 2023-11-11 RX ADMIN — DILTIAZEM HYDROCHLORIDE 240 MG: 120 CAPSULE, COATED, EXTENDED RELEASE ORAL at 13:06

## 2023-11-11 RX ADMIN — PIRFENIDONE 267 MG: 267 TABLET, FILM COATED ORAL at 20:29

## 2023-11-11 RX ADMIN — PREDNISONE 40 MG: 20 TABLET ORAL at 13:07

## 2023-11-11 RX ADMIN — IPRATROPIUM BROMIDE AND ALBUTEROL SULFATE 1 DOSE: .5; 2.5 SOLUTION RESPIRATORY (INHALATION) at 16:16

## 2023-11-11 RX ADMIN — Medication 10 ML: at 20:29

## 2023-11-11 RX ADMIN — MIRTAZAPINE 30 MG: 15 TABLET, FILM COATED ORAL at 20:28

## 2023-11-11 RX ADMIN — METHYLPREDNISOLONE SODIUM SUCCINATE 125 MG: 125 INJECTION INTRAMUSCULAR; INTRAVENOUS at 05:14

## 2023-11-11 ASSESSMENT — PAIN - FUNCTIONAL ASSESSMENT
PAIN_FUNCTIONAL_ASSESSMENT: NONE - DENIES PAIN
PAIN_FUNCTIONAL_ASSESSMENT: NONE - DENIES PAIN

## 2023-11-11 ASSESSMENT — PAIN SCALES - GENERAL: PAINLEVEL_OUTOF10: 0

## 2023-11-11 NOTE — CONSULTS
Pulmonary & Critical Care Consultation Note    Patient is being seen at the request of Dr. Tutu Fischer for a consultation for ILD     HISTORY OF PRESENT ILLNESS: This is a 75-year-old female who has progressive severe ILD and refractory hypoxemia who was recently hospitalized with COVID-19 and then subsequently with influenza A, discharged to inpatient rehab on 10/18/2023. She was sent home from rehab on 11/6/2023 and then developed worsening shortness of breath with refractory hypoxemia, per her and her spouse's report, this seem to be related to a very quick transition to home and lack of preparedness with regards to having BiPAP and oxygen ready at home. We discharged her 2 days ago with a clear plan for making the transition to home and she tells me that she actually did well. Unfortunately yesterday she had 3 separate events where she developed acute severe dyspnea, and it was in the and refractory to oxygen therapy and so she was brought to the emergency department. She feels back to her prior baseline currently. PAST MEDICAL HISTORY:  Past Medical History:   Diagnosis Date    A-fib ([address])     Acute cystitis without hematuria     Anxiety     CHF (congestive heart failure) (HCC)     COPD (chronic obstructive pulmonary disease) ([address])     Cryptogenic organizing pneumonia ([address])     Depression     Diabetes mellitus ([address])     GERD (gastroesophageal reflux disease)     Hyperlipidemia     Hypertension     ILD (interstitial lung disease) ([address])     On home oxygen therapy     uses O2 3L prn    Rash     Thyroid disease     hypothyroidism     PAST SURGICAL HISTORY:  Past Surgical History:   Procedure Laterality Date    ARM SURGERY Left 3/2/2020    LEFT ULNAR NERVE DECOMPRESSION AT THE ELBOW performed by Azul Walter MD at [address] [place] [address] [address] N/A 6/27/2019    EBUS WF W/ANES.  performed by May Marie MD at [address] [place] [address]  6/27/2019    BRONCHOSCOPY/TRANSBRONCHIAL NEEDLE BIOPSY

## 2023-11-11 NOTE — ED NOTES
SPO2 70-90% on 15 NRBR. Placed on Bi-Pap, 16/8 100% FIO2 increasing SPO2 to 99%. Reduced FIO2 to 70%. SPO2 remains 98%.   Pt tolerating well     Hazeline Curtis, RN  11/11/23 6639

## 2023-11-11 NOTE — ED PROVIDER NOTES
Emergency Department Encounter    Patient: Kenny Obando  MRN: [id number]  : 1955  Date of Evaluation: 2023  ED Provider:  Milton Page MD    Triage Chief Complaint:   Shortness of Breath (Pt via squad for SOB. Released from [facility] yesterday. COPD)    Inaja:  Diamond Bender is a 77 y.o. female with history seen below presenting with increased shortness of breath. Patient was just recently discharged from [facility]. Patient has a history of interstitial lung disease. Patient has chronic respiratory failure with baseline oxygen between 4 to 8 L per pulmonary note. Patient does have BiPAP at home as well and  states that she wears at night. Patient states she was discharged she has had increasing shortness of breath. Denies chest pain. Denies cough, sputum production. Denies fevers or chills. States this feels similar to prior episodes of increased shortness of breath. Denies abdominal pain, nausea vomiting, diarrhea constipation or urinary symptoms. Denies lower extremity edema or signs of fluid overload. Looking through her chart it appears that patient has been admitted 1-2 times every month for the past several months.   Patient's been seen by palliative care and has refused hospice in the past.    ROS - see HPI, below listed is current ROS at time of my eval:  At least 14 systems reviewed, negative other HPI    Past Medical History:   Diagnosis Date    A-fib ([address])     Acute cystitis without hematuria     Anxiety     CHF (congestive heart failure) (HCC)     COPD (chronic obstructive pulmonary disease) ([address])     Cryptogenic organizing pneumonia ([address])     Depression     Diabetes mellitus ([address])     GERD (gastroesophageal reflux disease)     Hyperlipidemia     Hypertension     ILD (interstitial lung disease) ([address])     On home oxygen therapy     uses O2 3L prn    Rash     Thyroid disease     hypothyroidism     Past Surgical History:   Procedure Laterality Date

## 2023-11-11 NOTE — ED NOTES
Report called to Florecita Seals RN at [facility] ICU. Bedside report to [facility]. VSS on BiPap. Settings remain at 16/8 with 70%Fio2. Patient being transferred to [facility] at this time.       Karyn Gowers, RN  11/11/23 1000

## 2023-11-12 LAB
ANION GAP SERPL CALCULATED.3IONS-SCNC: 9 MMOL/L (ref 3–16)
ANISOCYTOSIS BLD QL SMEAR: ABNORMAL
BASOPHILS # BLD: 0 K/UL (ref 0–0.2)
BASOPHILS NFR BLD: 0 %
BUN SERPL-MCNC: 22 MG/DL (ref 7–20)
CALCIUM SERPL-MCNC: 9.1 MG/DL (ref 8.3–10.6)
CHLORIDE SERPL-SCNC: 98 MMOL/L (ref 99–110)
CO2 SERPL-SCNC: 30 MMOL/L (ref 21–32)
CREAT SERPL-MCNC: <0.5 MG/DL (ref 0.6–1.2)
DEPRECATED RDW RBC AUTO: 16.3 % (ref 12.4–15.4)
EOSINOPHIL # BLD: 0.1 K/UL (ref 0–0.6)
EOSINOPHIL NFR BLD: 1 %
GFR SERPLBLD CREATININE-BSD FMLA CKD-EPI: >60 ML/MIN/{1.73_M2}
GLUCOSE BLD-MCNC: 121 MG/DL (ref 70–99)
GLUCOSE BLD-MCNC: 138 MG/DL (ref 70–99)
GLUCOSE BLD-MCNC: 145 MG/DL (ref 70–99)
GLUCOSE BLD-MCNC: 240 MG/DL (ref 70–99)
GLUCOSE SERPL-MCNC: 155 MG/DL (ref 70–99)
HCT VFR BLD AUTO: 29.1 % (ref 36–48)
HGB BLD-MCNC: 9.4 G/DL (ref 12–16)
LYMPHOCYTES # BLD: 1.9 K/UL (ref 1–5.1)
LYMPHOCYTES NFR BLD: 17 %
MCH RBC QN AUTO: 27.5 PG (ref 26–34)
MCHC RBC AUTO-ENTMCNC: 32.4 G/DL (ref 31–36)
MCV RBC AUTO: 84.8 FL (ref 80–100)
MONOCYTES # BLD: 0.7 K/UL (ref 0–1.3)
MONOCYTES NFR BLD: 6 %
NEUTROPHILS # BLD: 8.5 K/UL (ref 1.7–7.7)
NEUTROPHILS NFR BLD: 76 %
PERFORMED ON: ABNORMAL
PLATELET # BLD AUTO: 139 K/UL (ref 135–450)
PLATELET BLD QL SMEAR: ADEQUATE
PMV BLD AUTO: 7.8 FL (ref 5–10.5)
POIKILOCYTOSIS BLD QL SMEAR: ABNORMAL
POTASSIUM SERPL-SCNC: 3.9 MMOL/L (ref 3.5–5.1)
RBC # BLD AUTO: 3.43 M/UL (ref 4–5.2)
SLIDE REVIEW: ABNORMAL
SODIUM SERPL-SCNC: 137 MMOL/L (ref 136–145)
WBC # BLD AUTO: 11.2 K/UL (ref 4–11)

## 2023-11-12 PROCEDURE — 36415 COLL VENOUS BLD VENIPUNCTURE: CPT

## 2023-11-12 PROCEDURE — 6370000000 HC RX 637 (ALT 250 FOR IP): Performed by: INTERNAL MEDICINE

## 2023-11-12 PROCEDURE — 2580000003 HC RX 258: Performed by: INTERNAL MEDICINE

## 2023-11-12 PROCEDURE — 6360000002 HC RX W HCPCS: Performed by: INTERNAL MEDICINE

## 2023-11-12 PROCEDURE — 99233 SBSQ HOSP IP/OBS HIGH 50: CPT | Performed by: INTERNAL MEDICINE

## 2023-11-12 PROCEDURE — 80048 BASIC METABOLIC PNL TOTAL CA: CPT

## 2023-11-12 PROCEDURE — 94660 CPAP INITIATION&MGMT: CPT

## 2023-11-12 PROCEDURE — 85025 COMPLETE CBC W/AUTO DIFF WBC: CPT

## 2023-11-12 PROCEDURE — 2700000000 HC OXYGEN THERAPY PER DAY

## 2023-11-12 PROCEDURE — 94640 AIRWAY INHALATION TREATMENT: CPT

## 2023-11-12 PROCEDURE — 2000000000 HC ICU R&B

## 2023-11-12 PROCEDURE — 94761 N-INVAS EAR/PLS OXIMETRY MLT: CPT

## 2023-11-12 RX ORDER — INSULIN GLARGINE 100 [IU]/ML
10 INJECTION, SOLUTION SUBCUTANEOUS ONCE
Status: COMPLETED | OUTPATIENT
Start: 2023-11-12 | End: 2023-11-12

## 2023-11-12 RX ORDER — IPRATROPIUM BROMIDE AND ALBUTEROL SULFATE 2.5; .5 MG/3ML; MG/3ML
1 SOLUTION RESPIRATORY (INHALATION)
Status: DISCONTINUED | OUTPATIENT
Start: 2023-11-12 | End: 2023-11-13 | Stop reason: HOSPADM

## 2023-11-12 RX ORDER — CLONAZEPAM 0.5 MG/1
0.5 TABLET ORAL 3 TIMES DAILY
Status: DISCONTINUED | OUTPATIENT
Start: 2023-11-12 | End: 2023-11-12

## 2023-11-12 RX ORDER — CLONAZEPAM 0.5 MG/1
0.5 TABLET ORAL EVERY 12 HOURS
Status: DISCONTINUED | OUTPATIENT
Start: 2023-11-12 | End: 2023-11-13 | Stop reason: HOSPADM

## 2023-11-12 RX ORDER — DEXTROSE MONOHYDRATE 100 MG/ML
INJECTION, SOLUTION INTRAVENOUS CONTINUOUS PRN
Status: DISCONTINUED | OUTPATIENT
Start: 2023-11-12 | End: 2023-11-13 | Stop reason: HOSPADM

## 2023-11-12 RX ADMIN — PIRFENIDONE 267 MG: 267 TABLET, FILM COATED ORAL at 20:42

## 2023-11-12 RX ADMIN — LORAZEPAM 0.5 MG: 0.5 TABLET ORAL at 18:03

## 2023-11-12 RX ADMIN — ATORVASTATIN CALCIUM 40 MG: 40 TABLET, FILM COATED ORAL at 20:41

## 2023-11-12 RX ADMIN — CLONAZEPAM 0.5 MG: 0.5 TABLET ORAL at 10:16

## 2023-11-12 RX ADMIN — PREDNISONE 30 MG: 20 TABLET ORAL at 09:08

## 2023-11-12 RX ADMIN — DILTIAZEM HYDROCHLORIDE 240 MG: 120 CAPSULE, COATED, EXTENDED RELEASE ORAL at 09:08

## 2023-11-12 RX ADMIN — ACETAMINOPHEN 650 MG: 325 TABLET ORAL at 02:35

## 2023-11-12 RX ADMIN — GUAIFENESIN 600 MG: 600 TABLET, EXTENDED RELEASE ORAL at 09:08

## 2023-11-12 RX ADMIN — MIRTAZAPINE 30 MG: 15 TABLET, FILM COATED ORAL at 20:41

## 2023-11-12 RX ADMIN — ONDANSETRON 4 MG: 2 INJECTION INTRAMUSCULAR; INTRAVENOUS at 10:17

## 2023-11-12 RX ADMIN — SERTRALINE 200 MG: 100 TABLET, FILM COATED ORAL at 09:08

## 2023-11-12 RX ADMIN — PANTOPRAZOLE SODIUM 40 MG: 40 TABLET, DELAYED RELEASE ORAL at 09:10

## 2023-11-12 RX ADMIN — CLONAZEPAM 0.5 MG: 0.5 TABLET ORAL at 20:42

## 2023-11-12 RX ADMIN — Medication 10 ML: at 09:35

## 2023-11-12 RX ADMIN — IPRATROPIUM BROMIDE AND ALBUTEROL SULFATE 1 DOSE: .5; 2.5 SOLUTION RESPIRATORY (INHALATION) at 19:26

## 2023-11-12 RX ADMIN — INSULIN LISPRO 2 UNITS: 100 INJECTION, SOLUTION INTRAVENOUS; SUBCUTANEOUS at 16:38

## 2023-11-12 RX ADMIN — PIRFENIDONE 267 MG: 267 TABLET, FILM COATED ORAL at 09:17

## 2023-11-12 RX ADMIN — IPRATROPIUM BROMIDE AND ALBUTEROL SULFATE 1 DOSE: .5; 2.5 SOLUTION RESPIRATORY (INHALATION) at 11:43

## 2023-11-12 RX ADMIN — Medication 10 ML: at 20:42

## 2023-11-12 RX ADMIN — INSULIN GLARGINE 10 UNITS: 100 INJECTION, SOLUTION SUBCUTANEOUS at 10:56

## 2023-11-12 RX ADMIN — NADOLOL 20 MG: 40 TABLET ORAL at 09:23

## 2023-11-12 RX ADMIN — GUAIFENESIN 600 MG: 600 TABLET, EXTENDED RELEASE ORAL at 20:41

## 2023-11-12 RX ADMIN — ENOXAPARIN SODIUM 40 MG: 100 INJECTION SUBCUTANEOUS at 09:08

## 2023-11-12 RX ADMIN — LEVOTHYROXINE SODIUM 100 MCG: 100 TABLET ORAL at 09:10

## 2023-11-12 ASSESSMENT — PAIN - FUNCTIONAL ASSESSMENT: PAIN_FUNCTIONAL_ASSESSMENT: ACTIVITIES ARE NOT PREVENTED

## 2023-11-12 ASSESSMENT — PAIN DESCRIPTION - LOCATION: LOCATION: HEAD

## 2023-11-12 ASSESSMENT — PAIN DESCRIPTION - ORIENTATION: ORIENTATION: MID

## 2023-11-12 ASSESSMENT — PAIN DESCRIPTION - DESCRIPTORS: DESCRIPTORS: ACHING

## 2023-11-12 ASSESSMENT — PAIN SCALES - GENERAL: PAINLEVEL_OUTOF10: 7

## 2023-11-12 NOTE — PLAN OF CARE
Problem: Discharge Planning  Goal: Discharge to home or other facility with appropriate resources  Outcome: Progressing  Flowsheets (Taken 11/12/2023 0908)  Discharge to home or other facility with appropriate resources: Identify barriers to discharge with patient and caregiver     Problem: Safety - Adult  Goal: Free from fall injury  Outcome: Progressing     Problem: Skin/Tissue Integrity  Goal: Absence of new skin breakdown  Description: 1. Monitor for areas of redness and/or skin breakdown  2. Assess vascular access sites hourly  3. Every 4-6 hours minimum:  Change oxygen saturation probe site  4. Every 4-6 hours:  If on nasal continuous positive airway pressure, respiratory therapy assess nares and determine need for appliance change or resting period.   Outcome: Progressing     Problem: Chronic Conditions and Co-morbidities  Goal: Patient's chronic conditions and co-morbidity symptoms are monitored and maintained or improved  Outcome: Progressing  Flowsheets (Taken 11/12/2023 0908)  Care Plan - Patient's Chronic Conditions and Co-Morbidity Symptoms are Monitored and Maintained or Improved: Monitor and assess patient's chronic conditions and comorbid symptoms for stability, deterioration, or improvement

## 2023-11-12 NOTE — CARE COORDINATION
Case Management Assessment  Initial Evaluation    Date/Time of Evaluation: 11/12/2023 2:52 PM  Assessment Completed by: Eyal Najera    If patient is discharged prior to next notation, then this note serves as note for discharge by case management. Patient Name: Jose Obando                   YOB: 1955  Diagnosis: Acute on chronic respiratory failure with hypoxia ([address]) [J96.21]  Acute on chronic respiratory failure ([address]) [J96.20]  COPD with acute exacerbation ([address]) [J44.1]                   Date / Time: 11/11/2023  4:07 AM    Patient Admission Status: Inpatient   Readmission Risk (Low < 19, Mod (19-27), High > 27): Readmission Risk Score: 43.6    Current PCP: Nicolette Rios MD  PCP verified by ? Yes [facility])    Chart Reviewed: Yes      History Provided by: Patient  Patient Orientation: Alert and Oriented    Patient Cognition: Alert    Hospitalization in the last 30 days (Readmission):  Yes    If yes, Readmission Assessment in  Navigator will be completed.     Advance Directives:      Code Status: Full Code   Patient's Primary Decision Maker is: Legal Next of Kin    Primary Decision Maker: Joie Demarco - Spouse - [phone]    Secondary Decision Maker: Jing Alexandra Child - [phone]    Discharge Planning:    Patient lives with: Spouse/Significant Other Type of Home:    Primary Care Giver: Self  Patient Support Systems include: Spouse/Significant Other, Children   Current Financial resources: Medicare  Current community resources: None  Current services prior to admission: Oxygen Therapy, Durable Medical Equipment            Current DME: Oxygen Therapy (Comment), Wheelchair, Cpap            Type of Home Care services:  [facility], PT, OT, Nursing Services    ADLS  Prior functional level: Assistance with the following:, Bathing, Dressing, Toileting, Cooking, Housework, Shopping, Mobility  Current functional level: Assistance with the following:, Bathing, Dressing,

## 2023-11-12 NOTE — ACP (ADVANCE CARE PLANNING)
Advance Care Planning     General Advance Care Planning (ACP) Conversation    Date of Conversation: 11/11/2023  Conducted with: Patient with Decision Making Capacity    Healthcare Decision Maker:    Primary Decision Maker: Trace Allison - Spouse - [phone]    Secondary Decision Maker: Kathy Florentino - Child - [phone]  Click here to complete Healthcare Decision Makers including selection of the Healthcare Decision Maker Relationship (ie \"Primary\"). Today we documented Decision Maker(s) consistent with Legal Next of Kin hierarchy. Content/Action Overview:   Has ACP document(s) on file - reflects the patient's care preferences  Reviewed DNR/DNI and patient elects Full Code (Attempt Resuscitation)        Length of Voluntary ACP Conversation in minutes:  <16 minutes (Non-Billable)    Shawanda Moss

## 2023-11-12 NOTE — PLAN OF CARE
Problem: Discharge Planning  Goal: Discharge to home or other facility with appropriate resources  Outcome: Progressing  Flowsheets (Taken 11/11/2023 1100 by Jazlyn Lozada RN)  Discharge to home or other facility with appropriate resources: Identify barriers to discharge with patient and caregiver     Problem: Safety - Adult  Goal: Free from fall injury  Outcome: Progressing     Problem: Skin/Tissue Integrity  Goal: Absence of new skin breakdown  Description: 1. Monitor for areas of redness and/or skin breakdown  2. Assess vascular access sites hourly  3. Every 4-6 hours minimum:  Change oxygen saturation probe site  4. Every 4-6 hours:  If on nasal continuous positive airway pressure, respiratory therapy assess nares and determine need for appliance change or resting period.   Outcome: Progressing     Problem: Chronic Conditions and Co-morbidities  Goal: Patient's chronic conditions and co-morbidity symptoms are monitored and maintained or improved  Outcome: Progressing  Flowsheets (Taken 11/11/2023 1100 by Jazlyn Lozada RN)  Care Plan - Patient's Chronic Conditions and Co-Morbidity Symptoms are Monitored and Maintained or Improved: Monitor and assess patient's chronic conditions and comorbid symptoms for stability, deterioration, or improvement

## 2023-11-13 ENCOUNTER — CARE COORDINATION (OUTPATIENT)
Dept: CASE MANAGEMENT | Age: 68
End: 2023-11-13

## 2023-11-13 ENCOUNTER — TELEPHONE (OUTPATIENT)
Dept: FAMILY MEDICINE CLINIC | Age: 68
End: 2023-11-13

## 2023-11-13 VITALS
DIASTOLIC BLOOD PRESSURE: 69 MMHG | HEIGHT: 63 IN | BODY MASS INDEX: 22.5 KG/M2 | WEIGHT: 126.98 LBS | RESPIRATION RATE: 22 BRPM | HEART RATE: 61 BPM | SYSTOLIC BLOOD PRESSURE: 130 MMHG | OXYGEN SATURATION: 98 % | TEMPERATURE: 96.8 F

## 2023-11-13 LAB
ANION GAP SERPL CALCULATED.3IONS-SCNC: 10 MMOL/L (ref 3–16)
BUN SERPL-MCNC: 16 MG/DL (ref 7–20)
CALCIUM SERPL-MCNC: 8.5 MG/DL (ref 8.3–10.6)
CHLORIDE SERPL-SCNC: 101 MMOL/L (ref 99–110)
CO2 SERPL-SCNC: 29 MMOL/L (ref 21–32)
CREAT SERPL-MCNC: <0.5 MG/DL (ref 0.6–1.2)
GFR SERPLBLD CREATININE-BSD FMLA CKD-EPI: >60 ML/MIN/{1.73_M2}
GLUCOSE BLD-MCNC: 110 MG/DL (ref 70–99)
GLUCOSE BLD-MCNC: 160 MG/DL (ref 70–99)
GLUCOSE SERPL-MCNC: 184 MG/DL (ref 70–99)
PERFORMED ON: ABNORMAL
PERFORMED ON: ABNORMAL
POTASSIUM SERPL-SCNC: 3.6 MMOL/L (ref 3.5–5.1)
SODIUM SERPL-SCNC: 140 MMOL/L (ref 136–145)

## 2023-11-13 PROCEDURE — 94660 CPAP INITIATION&MGMT: CPT

## 2023-11-13 PROCEDURE — 6370000000 HC RX 637 (ALT 250 FOR IP): Performed by: INTERNAL MEDICINE

## 2023-11-13 PROCEDURE — 2700000000 HC OXYGEN THERAPY PER DAY

## 2023-11-13 PROCEDURE — 80048 BASIC METABOLIC PNL TOTAL CA: CPT

## 2023-11-13 PROCEDURE — 6360000002 HC RX W HCPCS: Performed by: INTERNAL MEDICINE

## 2023-11-13 PROCEDURE — 36415 COLL VENOUS BLD VENIPUNCTURE: CPT

## 2023-11-13 PROCEDURE — 99238 HOSP IP/OBS DSCHRG MGMT 30/<: CPT | Performed by: INTERNAL MEDICINE

## 2023-11-13 PROCEDURE — 94761 N-INVAS EAR/PLS OXIMETRY MLT: CPT

## 2023-11-13 PROCEDURE — 94640 AIRWAY INHALATION TREATMENT: CPT

## 2023-11-13 PROCEDURE — 99233 SBSQ HOSP IP/OBS HIGH 50: CPT | Performed by: INTERNAL MEDICINE

## 2023-11-13 RX ORDER — LEVOTHYROXINE SODIUM 0.1 MG/1
100 TABLET ORAL DAILY
Qty: 30 TABLET | Refills: 3 | Status: SHIPPED | OUTPATIENT
Start: 2023-11-13

## 2023-11-13 RX ORDER — PREDNISONE 10 MG/1
TABLET ORAL
Qty: 25 TABLET | Refills: 0 | Status: SHIPPED | OUTPATIENT
Start: 2023-11-14 | End: 2023-11-24

## 2023-11-13 RX ADMIN — IPRATROPIUM BROMIDE AND ALBUTEROL SULFATE 1 DOSE: .5; 2.5 SOLUTION RESPIRATORY (INHALATION) at 07:12

## 2023-11-13 RX ADMIN — PIRFENIDONE 267 MG: 267 TABLET, FILM COATED ORAL at 07:52

## 2023-11-13 RX ADMIN — PANTOPRAZOLE SODIUM 40 MG: 40 TABLET, DELAYED RELEASE ORAL at 06:24

## 2023-11-13 RX ADMIN — GUAIFENESIN 600 MG: 600 TABLET, EXTENDED RELEASE ORAL at 07:51

## 2023-11-13 RX ADMIN — ACETAMINOPHEN 650 MG: 325 TABLET ORAL at 02:30

## 2023-11-13 RX ADMIN — LEVOTHYROXINE SODIUM 100 MCG: 100 TABLET ORAL at 06:24

## 2023-11-13 RX ADMIN — ENOXAPARIN SODIUM 40 MG: 100 INJECTION SUBCUTANEOUS at 07:51

## 2023-11-13 RX ADMIN — DILTIAZEM HYDROCHLORIDE 240 MG: 120 CAPSULE, COATED, EXTENDED RELEASE ORAL at 07:51

## 2023-11-13 RX ADMIN — PREDNISONE 30 MG: 20 TABLET ORAL at 07:51

## 2023-11-13 RX ADMIN — LORAZEPAM 0.5 MG: 0.5 TABLET ORAL at 01:04

## 2023-11-13 RX ADMIN — TRAMADOL HYDROCHLORIDE 50 MG: 50 TABLET ORAL at 01:04

## 2023-11-13 RX ADMIN — IPRATROPIUM BROMIDE AND ALBUTEROL SULFATE 1 DOSE: .5; 2.5 SOLUTION RESPIRATORY (INHALATION) at 13:50

## 2023-11-13 RX ADMIN — NADOLOL 20 MG: 40 TABLET ORAL at 07:59

## 2023-11-13 RX ADMIN — SERTRALINE 200 MG: 100 TABLET, FILM COATED ORAL at 07:51

## 2023-11-13 RX ADMIN — LORAZEPAM 0.5 MG: 0.5 TABLET ORAL at 13:08

## 2023-11-13 RX ADMIN — CLONAZEPAM 0.5 MG: 0.5 TABLET ORAL at 10:32

## 2023-11-13 ASSESSMENT — COPD QUESTIONNAIRES
QUESTION7_SLEEPQUALITY: 5
CAT_TOTALSCORE: 33
QUESTION3_CHESTTIGHTNESS: 5
QUESTION6_LEAVINGHOUSE: 5
QUESTION1_COUGHFREQUENCY: 1
QUESTION4_WALKINCLINE: 5
QUESTION2_CHESTPHLEGM: 2
QUESTION8_ENERGYLEVEL: 5
QUESTION5_HOMEACTIVITIES: 5

## 2023-11-13 ASSESSMENT — PAIN SCALES - GENERAL
PAINLEVEL_OUTOF10: 5
PAINLEVEL_OUTOF10: 0
PAINLEVEL_OUTOF10: 7
PAINLEVEL_OUTOF10: 0

## 2023-11-13 ASSESSMENT — PAIN DESCRIPTION - DESCRIPTORS: DESCRIPTORS: ACHING

## 2023-11-13 ASSESSMENT — PAIN DESCRIPTION - LOCATION: LOCATION: BACK

## 2023-11-13 NOTE — PLAN OF CARE
Problem: Discharge Planning  Goal: Discharge to home or other facility with appropriate resources  11/12/2023 2105 by Melody Stephen RN  Outcome: Progressing  11/12/2023 1837 by Arsalan Wilkerson RN  Outcome: Progressing  Flowsheets (Taken 11/12/2023 0908)  Discharge to home or other facility with appropriate resources: Identify barriers to discharge with patient and caregiver     Problem: Safety - Adult  Goal: Free from fall injury  11/12/2023 2105 by Melody Stephen RN  Outcome: Progressing  11/12/2023 1837 by Arsalan Wilkerson RN  Outcome: Progressing     Problem: Skin/Tissue Integrity  Goal: Absence of new skin breakdown  Description: 1. Monitor for areas of redness and/or skin breakdown  2. Assess vascular access sites hourly  3. Every 4-6 hours minimum:  Change oxygen saturation probe site  4. Every 4-6 hours:  If on nasal continuous positive airway pressure, respiratory therapy assess nares and determine need for appliance change or resting period.   11/12/2023 2105 by Melody Stephen RN  Outcome: Progressing  11/12/2023 1837 by Arsalan Wilkerson RN  Outcome: Progressing     Problem: Chronic Conditions and Co-morbidities  Goal: Patient's chronic conditions and co-morbidity symptoms are monitored and maintained or improved  11/12/2023 2105 by Melody Stephen RN  Outcome: Progressing  11/12/2023 1837 by Arsalan Wilkerson RN  Outcome: Progressing  Flowsheets (Taken 11/12/2023 0908)  Care Plan - Patient's Chronic Conditions and Co-Morbidity Symptoms are Monitored and Maintained or Improved: Monitor and assess patient's chronic conditions and comorbid symptoms for stability, deterioration, or improvement

## 2023-11-13 NOTE — CARE COORDINATION
DISCHARGE ORDER  Date/Time 2023 1:59 PM  Completed by: Jluis Damon RN, Case Management    Patient Name: Curtis Corrigan      : 1955  Admitting Diagnosis: Acute on chronic respiratory failure with hypoxia ([address]) [J96.21]  Acute on chronic respiratory failure ([address]) [J96.20]  COPD with acute exacerbation ([address]) [J44.1]      Admit order Date and Status:23  (verify MD's last order for status of admission)      Noted discharge order. If applicable PT/OT recommendation at Discharge: NA  DME recommendation by PT/OT:  Confirmed discharge plan: Yes  with whom the patient. If pt confirmed DC plan does family need to be contacted by CM Yes if yes who - contacted spouse. Discharge Plan: The patient is discharging to home today. Spouse and daughter are here to pick the patient up. Date of Last IMM Given: 23    Reviewed chart. Role of discharge planner explained and patient verbalized understanding. Discharge order is noted. Has Home O2 in place on admit:  Yes  Informed of need to bring portable home O2 tank on day of discharge for nursing to connect prior to leaving:   Yes  Verbalized agreement/Understanding:   Yes  Pt is being d/c'd to home today. Pt's O2 sats are 98% on 8L. Discharge timeout done with Mariya Quispe RN All discharge needs and concerns addressed.

## 2023-11-13 NOTE — TELEPHONE ENCOUNTER
Daughter stated the hospital said they could not send her home with any and it needs to come from PCP. I informed her the Levothyroxine can not be prescribed until patient is discharged from hospital. She stated patient has now been discharged and they are on their way home.

## 2023-11-13 NOTE — CARE COORDINATION
Patient readmitted prior to successful care transition outreach after recent discharge to home with home care. Care transition program closed at this time. Sravan Camacho RN  Care Transition Nurse  [phone] mobile    No future appointments.

## 2023-11-13 NOTE — TELEPHONE ENCOUNTER
I want to move her hospital follow-up to this Wednesday and not wait a week for this. Since Klonopin is a new medication is controlled, I cannot prescribe it to her until she has her hospital follow-up. In Dr. Murray Peers note, it said consider Klonopin, did not see a prescribe it to her. That must be why she did not get it prescribed to her when she was discharged.

## 2023-11-13 NOTE — TELEPHONE ENCOUNTER
Patient's daughter called she is wanting your opinion of patient being on clonazepam 3 times a day for anxiety.   Also patient needs a refill of levothyroxine  Please advise

## 2023-11-14 ENCOUNTER — FOLLOWUP TELEPHONE ENCOUNTER (OUTPATIENT)
Dept: ADMINISTRATIVE | Age: 68
End: 2023-11-14

## 2023-11-14 ENCOUNTER — CARE COORDINATION (OUTPATIENT)
Dept: CASE MANAGEMENT | Age: 68
End: 2023-11-14

## 2023-11-14 NOTE — CARE COORDINATION
Care Transitions Initial Follow Up Call    Call within 2 business days of discharge: Yes      Patient: Gilson Stage Patient : 1955   MRN: [id number]  Reason for Admission: 2 day readmission -> acute on chronic hypoxic resp failure, acute ILD exac, paroxysmal A Fib not on AC 2/2 GIB recurrent, chronic dCHF, HTN, HLD, depression, anxiety, GERD, hypothyroidism, hyperglycemia -> home no services, likely needs HC order to [facility] , [facility] is dtr, Lisbeth Cornea NP)  Discharge Date: 23 RARS: Readmission Risk Score: 44.6      Last Discharge Facility       Date Complaint Diagnosis Description Type Department Provider    23 Shortness of Breath Acute on chronic respiratory failure with hypoxia [facility]) ED to Hosp-Admission (Discharged) (ADMIT) Lynsey Martinez MD; J... CTN attempted discharge follow-up outreach to patient. Message left including CTN contact information. CTN spoke with  at [facility] . She transferred call to [facility] in scheduling. They were not aware of DC yesterday. She will schedule a nurse to go out tomorrow.      Follow Up  Future Appointments   Date Time Provider [address] [place] [address]   11/15/2023  2:15 PM Brayden Alonso MD [address] [facility] MONO Acevedo, RN  Care Transition Nurse  [phone] mobile

## 2023-11-14 NOTE — TELEPHONE ENCOUNTER
Heart Failure Follow-up Call:     1st Attempt; No Answer- Left HIPAA compliant voicemail with Non-Urgent Heart Failure Resource Line number for call back.     Electronically signed by Albaro Mckeon MSN, RN  on 11/14/2023 at 12:17 PM

## 2023-11-15 ENCOUNTER — CARE COORDINATION (OUTPATIENT)
Dept: CASE MANAGEMENT | Age: 68
End: 2023-11-15

## 2023-11-15 ENCOUNTER — FOLLOWUP TELEPHONE ENCOUNTER (OUTPATIENT)
Dept: ADMINISTRATIVE | Age: 68
End: 2023-11-15

## 2023-11-15 ENCOUNTER — TELEMEDICINE (OUTPATIENT)
Dept: FAMILY MEDICINE CLINIC | Age: 68
End: 2023-11-15

## 2023-11-15 DIAGNOSIS — R73.9 HYPERGLYCEMIA: ICD-10-CM

## 2023-11-15 DIAGNOSIS — J84.9 ILD (INTERSTITIAL LUNG DISEASE) (HCC): ICD-10-CM

## 2023-11-15 DIAGNOSIS — J96.21 ACUTE ON CHRONIC RESPIRATORY FAILURE WITH HYPOXIA (HCC): ICD-10-CM

## 2023-11-15 DIAGNOSIS — I10 PRIMARY HYPERTENSION: ICD-10-CM

## 2023-11-15 DIAGNOSIS — F32.1 CURRENT MODERATE EPISODE OF MAJOR DEPRESSIVE DISORDER WITHOUT PRIOR EPISODE (HCC): ICD-10-CM

## 2023-11-15 DIAGNOSIS — K21.9 GASTROESOPHAGEAL REFLUX DISEASE WITHOUT ESOPHAGITIS: ICD-10-CM

## 2023-11-15 DIAGNOSIS — I48.0 PAF (PAROXYSMAL ATRIAL FIBRILLATION) (HCC): ICD-10-CM

## 2023-11-15 DIAGNOSIS — Z09 HOSPITAL DISCHARGE FOLLOW-UP: Primary | ICD-10-CM

## 2023-11-15 DIAGNOSIS — I50.42 CHRONIC COMBINED SYSTOLIC AND DIASTOLIC CONGESTIVE HEART FAILURE (HCC): ICD-10-CM

## 2023-11-15 DIAGNOSIS — E78.5 HYPERLIPIDEMIA WITH TARGET LDL LESS THAN 130: ICD-10-CM

## 2023-11-15 DIAGNOSIS — F41.9 ANXIETY: ICD-10-CM

## 2023-11-15 DIAGNOSIS — E11.9 TYPE 2 DIABETES MELLITUS WITHOUT COMPLICATION, WITHOUT LONG-TERM CURRENT USE OF INSULIN (HCC): ICD-10-CM

## 2023-11-15 LAB
ANABASINE UR-MCNC: <5 NG/ML
COTININE UR-MCNC: <15 NG/ML
NICOTINE UR-MCNC: <15 NG/ML
TRANS-3-OH-COTININE UR-MCNC: <50 NG/ML

## 2023-11-15 RX ORDER — CLONAZEPAM 0.5 MG/1
0.5 TABLET ORAL 3 TIMES DAILY PRN
Qty: 90 TABLET | Refills: 2 | Status: SHIPPED | OUTPATIENT
Start: 2023-11-15 | End: 2024-02-13

## 2023-11-15 ASSESSMENT — PATIENT HEALTH QUESTIONNAIRE - PHQ9
4. FEELING TIRED OR HAVING LITTLE ENERGY: 2
SUM OF ALL RESPONSES TO PHQ QUESTIONS 1-9: 6
9. THOUGHTS THAT YOU WOULD BE BETTER OFF DEAD, OR OF HURTING YOURSELF: 0
6. FEELING BAD ABOUT YOURSELF - OR THAT YOU ARE A FAILURE OR HAVE LET YOURSELF OR YOUR FAMILY DOWN: 0
SUM OF ALL RESPONSES TO PHQ QUESTIONS 1-9: 6
5. POOR APPETITE OR OVEREATING: 0
7. TROUBLE CONCENTRATING ON THINGS, SUCH AS READING THE NEWSPAPER OR WATCHING TELEVISION: 0
2. FEELING DOWN, DEPRESSED OR HOPELESS: 2
SUM OF ALL RESPONSES TO PHQ QUESTIONS 1-9: 6
10. IF YOU CHECKED OFF ANY PROBLEMS, HOW DIFFICULT HAVE THESE PROBLEMS MADE IT FOR YOU TO DO YOUR WORK, TAKE CARE OF THINGS AT HOME, OR GET ALONG WITH OTHER PEOPLE: 0
SUM OF ALL RESPONSES TO PHQ QUESTIONS 1-9: 6
3. TROUBLE FALLING OR STAYING ASLEEP: 0
SUM OF ALL RESPONSES TO PHQ9 QUESTIONS 1 & 2: 4
8. MOVING OR SPEAKING SO SLOWLY THAT OTHER PEOPLE COULD HAVE NOTICED. OR THE OPPOSITE, BEING SO FIGETY OR RESTLESS THAT YOU HAVE BEEN MOVING AROUND A LOT MORE THAN USUAL: 0
1. LITTLE INTEREST OR PLEASURE IN DOING THINGS: 2

## 2023-11-15 ASSESSMENT — COLUMBIA-SUICIDE SEVERITY RATING SCALE - C-SSRS
7. DID THIS OCCUR IN THE LAST THREE MONTHS: NO
4. HAVE YOU HAD THESE THOUGHTS AND HAD SOME INTENTION OF ACTING ON THEM?: NO
3. HAVE YOU BEEN THINKING ABOUT HOW YOU MIGHT KILL YOURSELF?: NO
5. HAVE YOU STARTED TO WORK OUT OR WORKED OUT THE DETAILS OF HOW TO KILL YOURSELF? DO YOU INTEND TO CARRY OUT THIS PLAN?: NO

## 2023-11-15 NOTE — CARE COORDINATION
Care Transitions Initial Follow Up Call    Call within 2 business days of discharge: Yes    Patient: Louisa Clark Patient : 1955   MRN: [id number]  Reason for Admission: 2 day readmission -> acute on chronic hypoxic resp failure, acute ILD exac, paroxysmal A Fib not on AC 2/2 GIB recurrent, chronic dCHF, HTN, HLD, depression, anxiety, GERD, hypothyroidism, hyperglycemia -> home no services, [facility], [facility] is dtr, Carman Castleman NP)  Discharge Date: 23 RARS: Readmission Risk Score: 44.6      Last Discharge Facility       Date Complaint Diagnosis Description Type Department Provider    23 Shortness of Breath Acute on chronic respiratory failure with hypoxia [facility]) ED to Hosp-Admission (Discharged) (ADMIT) Micheline Henning MD; J... CTN attempted follow-up outreach to patient. Message left including CTN contact information. Care transition program closed at this time. Patient has hospital follow up today with PCP (vv).     Follow Up  Future Appointments   Date Time Provider [address] [place] [address]   11/15/2023  2:15 PM Nicolette Rios MD [address] [facility]     Sravan Camacho RN  Care Transition Nurse  [phone] mobile

## 2023-11-15 NOTE — TELEPHONE ENCOUNTER
Heart Failure Follow-up Call:     3rd Attempt; No Answer- Left HIPAA compliant voicemail with Non-Urgent Heart Failure Resource Line number for call back.     Electronically signed by HARLEY Jaffe, RN  on 11/15/2023 at 1:15 PM

## 2023-12-04 ENCOUNTER — PATIENT MESSAGE (OUTPATIENT)
Dept: PULMONOLOGY | Age: 68
End: 2023-12-04

## 2023-12-04 DIAGNOSIS — J84.9 ILD (INTERSTITIAL LUNG DISEASE) (HCC): Primary | ICD-10-CM

## 2023-12-04 RX ORDER — ONDANSETRON 4 MG/1
4 TABLET, FILM COATED ORAL 3 TIMES DAILY PRN
Qty: 30 TABLET | Refills: 2 | Status: SHIPPED | OUTPATIENT
Start: 2023-12-04

## 2023-12-05 NOTE — TELEPHONE ENCOUNTER
From: Agustín Obando  To: Dr. Muhammad Rm: 12/4/2023 7:52 PM EST  Subject: Inhaler    I am about to run out of - albuterol sulfate  (90 Base) MCG/ACT inhaler. I havent had a refill since The summer. Am i able to get a refill of these please?! Any questions please call Gala Cook, my daughter [phone].  Thank you

## 2023-12-06 RX ORDER — ALBUTEROL SULFATE 90 UG/1
2 AEROSOL, METERED RESPIRATORY (INHALATION) EVERY 4 HOURS PRN
Qty: 18 G | Refills: 3 | Status: SHIPPED | OUTPATIENT
Start: 2023-12-06

## 2023-12-27 ENCOUNTER — HOSPITAL ENCOUNTER (INPATIENT)
Age: 68
LOS: 1 days | DRG: 871 | End: 2023-12-28
Attending: EMERGENCY MEDICINE | Admitting: INTERNAL MEDICINE
Payer: MEDICARE

## 2023-12-27 ENCOUNTER — APPOINTMENT (OUTPATIENT)
Dept: GENERAL RADIOLOGY | Age: 68
DRG: 871 | End: 2023-12-27
Attending: EMERGENCY MEDICINE
Payer: MEDICARE

## 2023-12-27 ENCOUNTER — APPOINTMENT (OUTPATIENT)
Dept: ULTRASOUND IMAGING | Age: 68
DRG: 871 | End: 2023-12-27
Payer: MEDICARE

## 2023-12-27 ENCOUNTER — APPOINTMENT (OUTPATIENT)
Dept: CT IMAGING | Age: 68
DRG: 871 | End: 2023-12-27
Attending: EMERGENCY MEDICINE
Payer: MEDICARE

## 2023-12-27 ENCOUNTER — APPOINTMENT (OUTPATIENT)
Dept: GENERAL RADIOLOGY | Age: 68
DRG: 871 | End: 2023-12-27
Payer: MEDICARE

## 2023-12-27 ENCOUNTER — APPOINTMENT (OUTPATIENT)
Dept: INTERVENTIONAL RADIOLOGY/VASCULAR | Age: 68
DRG: 871 | End: 2023-12-27
Attending: INTERNAL MEDICINE
Payer: MEDICARE

## 2023-12-27 VITALS
SYSTOLIC BLOOD PRESSURE: 49 MMHG | BODY MASS INDEX: 21.26 KG/M2 | HEIGHT: 63 IN | RESPIRATION RATE: 16 BRPM | TEMPERATURE: 97.3 F | DIASTOLIC BLOOD PRESSURE: 13 MMHG | WEIGHT: 120 LBS

## 2023-12-27 DIAGNOSIS — A41.9 SEPTICEMIA (HCC): Primary | ICD-10-CM

## 2023-12-27 DIAGNOSIS — J96.21 ACUTE ON CHRONIC RESPIRATORY FAILURE WITH HYPOXIA (HCC): ICD-10-CM

## 2023-12-27 DIAGNOSIS — J18.9 PNEUMONIA DUE TO INFECTIOUS ORGANISM, UNSPECIFIED LATERALITY, UNSPECIFIED PART OF LUNG: ICD-10-CM

## 2023-12-27 DIAGNOSIS — A41.9 SEPTIC SHOCK (HCC): ICD-10-CM

## 2023-12-27 DIAGNOSIS — T68.XXXA HYPOTHERMIA, INITIAL ENCOUNTER: ICD-10-CM

## 2023-12-27 DIAGNOSIS — R79.89 ELEVATED TROPONIN: ICD-10-CM

## 2023-12-27 DIAGNOSIS — R65.21 SEPTIC SHOCK (HCC): ICD-10-CM

## 2023-12-27 PROBLEM — R57.9 SHOCK (HCC): Status: ACTIVE | Noted: 2023-12-27

## 2023-12-27 LAB
ALBUMIN SERPL-MCNC: 3.9 G/DL (ref 3.4–5)
ALBUMIN/GLOB SERPL: 1.7 {RATIO} (ref 1.1–2.2)
ALP SERPL-CCNC: 75 U/L (ref 40–129)
ALT SERPL-CCNC: 18 U/L (ref 10–40)
ANION GAP SERPL CALCULATED.3IONS-SCNC: 23 MMOL/L (ref 3–16)
AST SERPL-CCNC: 56 U/L (ref 15–37)
BASE EXCESS BLDA CALC-SCNC: -20.9 MMOL/L (ref -3–3)
BASE EXCESS BLDV CALC-SCNC: -13.7 MMOL/L (ref -3–3)
BASE EXCESS BLDV CALC-SCNC: -7.3 MMOL/L (ref -3–3)
BASOPHILS # BLD: 0.2 K/UL (ref 0–0.2)
BASOPHILS NFR BLD: 1.3 %
BETA-HYDROXYBUTYRATE: 0.2 MMOL/L (ref 0–0.27)
BILIRUB SERPL-MCNC: 0.4 MG/DL (ref 0–1)
BILIRUB UR QL STRIP.AUTO: ABNORMAL
BUN SERPL-MCNC: 20 MG/DL (ref 7–20)
CALCIUM SERPL-MCNC: 9 MG/DL (ref 8.3–10.6)
CHLORIDE SERPL-SCNC: 96 MMOL/L (ref 99–110)
CLARITY UR: CLEAR
CO2 BLDA-SCNC: 12.9 MMOL/L
CO2 BLDV-SCNC: 17 MMOL/L
CO2 BLDV-SCNC: 21 MMOL/L
CO2 SERPL-SCNC: 21 MMOL/L (ref 21–32)
COHGB MFR BLDA: 0 % (ref 0–1.5)
COHGB MFR BLDV: 1.4 % (ref 0–1.5)
COHGB MFR BLDV: 1.5 % (ref 0–1.5)
COLOR UR: YELLOW
CREAT SERPL-MCNC: 1.2 MG/DL (ref 0.6–1.2)
DEPRECATED RDW RBC AUTO: 15.9 % (ref 12.4–15.4)
EKG ATRIAL RATE: 73 BPM
EKG DIAGNOSIS: NORMAL
EKG P AXIS: 48 DEGREES
EKG P-R INTERVAL: 154 MS
EKG Q-T INTERVAL: 432 MS
EKG QRS DURATION: 86 MS
EKG QTC CALCULATION (BAZETT): 475 MS
EKG R AXIS: 96 DEGREES
EKG T AXIS: 106 DEGREES
EKG VENTRICULAR RATE: 73 BPM
EOSINOPHIL # BLD: 0.3 K/UL (ref 0–0.6)
EOSINOPHIL NFR BLD: 2 %
EPI CELLS #/AREA URNS HPF: NORMAL /HPF (ref 0–5)
FLUAV RNA UPPER RESP QL NAA+PROBE: NEGATIVE
FLUBV AG NPH QL: NEGATIVE
GFR SERPLBLD CREATININE-BSD FMLA CKD-EPI: 48 ML/MIN/{1.73_M2}
GLUCOSE BLD-MCNC: 27 MG/DL (ref 70–99)
GLUCOSE BLD-MCNC: 45 MG/DL (ref 70–99)
GLUCOSE BLD-MCNC: 88 MG/DL (ref 70–99)
GLUCOSE SERPL-MCNC: 254 MG/DL (ref 70–99)
GLUCOSE UR STRIP.AUTO-MCNC: NEGATIVE MG/DL
HCO3 BLDA-SCNC: 11.3 MMOL/L (ref 21–29)
HCO3 BLDV-SCNC: 15.3 MMOL/L (ref 23–29)
HCO3 BLDV-SCNC: 19.6 MMOL/L (ref 23–29)
HCT VFR BLD AUTO: 38.5 % (ref 36–48)
HGB BLD-MCNC: 12.4 G/DL (ref 12–16)
HGB BLDA-MCNC: 12.3 G/DL (ref 12–16)
HGB UR QL STRIP.AUTO: NEGATIVE
INR PPP: 2.28 (ref 0.84–1.16)
KETONES UR STRIP.AUTO-MCNC: NEGATIVE MG/DL
LACTATE BLDV-SCNC: 6.5 MMOL/L (ref 0.4–1.9)
LACTATE BLDV-SCNC: 7.2 MMOL/L (ref 0.4–1.9)
LACTATE BLDV-SCNC: 7.3 MMOL/L (ref 0.4–2)
LACTATE BLDV-SCNC: 8.5 MMOL/L (ref 0.4–1.9)
LACTATE BLDV-SCNC: 9.7 MMOL/L (ref 0.4–2)
LEUKOCYTE ESTERASE UR QL STRIP.AUTO: NEGATIVE
LIPASE SERPL-CCNC: 57 U/L (ref 13–60)
LYMPHOCYTES # BLD: 4.5 K/UL (ref 1–5.1)
LYMPHOCYTES NFR BLD: 28.9 %
MAGNESIUM SERPL-MCNC: 1.8 MG/DL (ref 1.8–2.4)
MCH RBC QN AUTO: 27.3 PG (ref 26–34)
MCHC RBC AUTO-ENTMCNC: 32.2 G/DL (ref 31–36)
MCV RBC AUTO: 84.6 FL (ref 80–100)
METHGB MFR BLDA: 0.3 %
METHGB MFR BLDV: 0.3 %
METHGB MFR BLDV: 0.3 %
MONOCYTES # BLD: 1.2 K/UL (ref 0–1.3)
MONOCYTES NFR BLD: 8 %
NEUTROPHILS # BLD: 9.3 K/UL (ref 1.7–7.7)
NEUTROPHILS NFR BLD: 59.8 %
NITRITE UR QL STRIP.AUTO: NEGATIVE
O2 THERAPY: ABNORMAL
PCO2 BLDA: 54 MMHG (ref 35–45)
PCO2 BLDV: 44.5 MMHG (ref 40–50)
PCO2 BLDV: 47.6 MMHG (ref 40–50)
PERFORMED ON: ABNORMAL
PERFORMED ON: ABNORMAL
PERFORMED ON: NORMAL
PH BLDA: 6.94 [PH] (ref 7.35–7.45)
PH BLDV: 7.12 [PH] (ref 7.35–7.45)
PH BLDV: 7.26 [PH] (ref 7.35–7.45)
PH UR STRIP.AUTO: 5.5 [PH] (ref 5–8)
PLATELET # BLD AUTO: 252 K/UL (ref 135–450)
PMV BLD AUTO: 7.7 FL (ref 5–10.5)
PO2 BLDA: 99 MMHG (ref 75–108)
PO2 BLDV: 33 MMHG (ref 25–40)
PO2 BLDV: 41.6 MMHG (ref 25–40)
POTASSIUM SERPL-SCNC: 4.3 MMOL/L (ref 3.5–5.1)
PROT SERPL-MCNC: 6.2 G/DL (ref 6.4–8.2)
PROT UR STRIP.AUTO-MCNC: 100 MG/DL
PROTHROMBIN TIME: 25 SEC (ref 11.5–14.8)
RBC # BLD AUTO: 4.55 M/UL (ref 4–5.2)
RBC #/AREA URNS HPF: NORMAL /HPF (ref 0–4)
SAO2 % BLDA: 92.2 %
SAO2 % BLDV: 46 %
SAO2 % BLDV: 70 %
SARS-COV-2 RDRP RESP QL NAA+PROBE: NOT DETECTED
SODIUM SERPL-SCNC: 140 MMOL/L (ref 136–145)
SP GR UR STRIP.AUTO: >=1.03 (ref 1–1.03)
TROPONIN, HIGH SENSITIVITY: 39 NG/L (ref 0–14)
TROPONIN, HIGH SENSITIVITY: 42 NG/L (ref 0–14)
TROPONIN, HIGH SENSITIVITY: 43 NG/L (ref 0–14)
TROPONIN, HIGH SENSITIVITY: 46 NG/L (ref 0–14)
UA COMPLETE W REFLEX CULTURE PNL UR: ABNORMAL
UA DIPSTICK W REFLEX MICRO PNL UR: YES
URN SPEC COLLECT METH UR: ABNORMAL
UROBILINOGEN UR STRIP-ACNC: 1 E.U./DL
WBC # BLD AUTO: 15.5 K/UL (ref 4–11)
WBC #/AREA URNS HPF: NORMAL /HPF (ref 0–5)

## 2023-12-27 PROCEDURE — 83690 ASSAY OF LIPASE: CPT

## 2023-12-27 PROCEDURE — 6360000002 HC RX W HCPCS

## 2023-12-27 PROCEDURE — 94761 N-INVAS EAR/PLS OXIMETRY MLT: CPT

## 2023-12-27 PROCEDURE — 2580000003 HC RX 258: Performed by: EMERGENCY MEDICINE

## 2023-12-27 PROCEDURE — 2700000000 HC OXYGEN THERAPY PER DAY

## 2023-12-27 PROCEDURE — 5A09357 ASSISTANCE WITH RESPIRATORY VENTILATION, LESS THAN 24 CONSECUTIVE HOURS, CONTINUOUS POSITIVE AIRWAY PRESSURE: ICD-10-PCS | Performed by: EMERGENCY MEDICINE

## 2023-12-27 PROCEDURE — 84484 ASSAY OF TROPONIN QUANT: CPT

## 2023-12-27 PROCEDURE — 83735 ASSAY OF MAGNESIUM: CPT

## 2023-12-27 PROCEDURE — 82533 TOTAL CORTISOL: CPT

## 2023-12-27 PROCEDURE — 87635 SARS-COV-2 COVID-19 AMP PRB: CPT

## 2023-12-27 PROCEDURE — C9113 INJ PANTOPRAZOLE SODIUM, VIA: HCPCS | Performed by: INTERNAL MEDICINE

## 2023-12-27 PROCEDURE — 36415 COLL VENOUS BLD VENIPUNCTURE: CPT

## 2023-12-27 PROCEDURE — 2580000003 HC RX 258

## 2023-12-27 PROCEDURE — 99292 CRITICAL CARE ADDL 30 MIN: CPT | Performed by: INTERNAL MEDICINE

## 2023-12-27 PROCEDURE — 36573 INSJ PICC RS&I 5 YR+: CPT

## 2023-12-27 PROCEDURE — 82010 KETONE BODYS QUAN: CPT

## 2023-12-27 PROCEDURE — 6370000000 HC RX 637 (ALT 250 FOR IP): Performed by: EMERGENCY MEDICINE

## 2023-12-27 PROCEDURE — 6360000004 HC RX CONTRAST MEDICATION: Performed by: EMERGENCY MEDICINE

## 2023-12-27 PROCEDURE — 6360000002 HC RX W HCPCS: Performed by: INTERNAL MEDICINE

## 2023-12-27 PROCEDURE — 82803 BLOOD GASES ANY COMBINATION: CPT

## 2023-12-27 PROCEDURE — 85025 COMPLETE CBC W/AUTO DIFF WBC: CPT

## 2023-12-27 PROCEDURE — 87040 BLOOD CULTURE FOR BACTERIA: CPT

## 2023-12-27 PROCEDURE — 83605 ASSAY OF LACTIC ACID: CPT

## 2023-12-27 PROCEDURE — 87804 INFLUENZA ASSAY W/OPTIC: CPT

## 2023-12-27 PROCEDURE — 36600 WITHDRAWAL OF ARTERIAL BLOOD: CPT

## 2023-12-27 PROCEDURE — 96375 TX/PRO/DX INJ NEW DRUG ADDON: CPT

## 2023-12-27 PROCEDURE — 71045 X-RAY EXAM CHEST 1 VIEW: CPT

## 2023-12-27 PROCEDURE — 85610 PROTHROMBIN TIME: CPT

## 2023-12-27 PROCEDURE — 93010 ELECTROCARDIOGRAM REPORT: CPT | Performed by: INTERNAL MEDICINE

## 2023-12-27 PROCEDURE — 3E033XZ INTRODUCTION OF VASOPRESSOR INTO PERIPHERAL VEIN, PERCUTANEOUS APPROACH: ICD-10-PCS | Performed by: INTERNAL MEDICINE

## 2023-12-27 PROCEDURE — 6360000002 HC RX W HCPCS: Performed by: EMERGENCY MEDICINE

## 2023-12-27 PROCEDURE — C1769 GUIDE WIRE: HCPCS

## 2023-12-27 PROCEDURE — 80053 COMPREHEN METABOLIC PANEL: CPT

## 2023-12-27 PROCEDURE — 76705 ECHO EXAM OF ABDOMEN: CPT

## 2023-12-27 PROCEDURE — 2500000003 HC RX 250 WO HCPCS

## 2023-12-27 PROCEDURE — 71260 CT THORAX DX C+: CPT

## 2023-12-27 PROCEDURE — 94660 CPAP INITIATION&MGMT: CPT

## 2023-12-27 PROCEDURE — 6360000002 HC RX W HCPCS: Performed by: STUDENT IN AN ORGANIZED HEALTH CARE EDUCATION/TRAINING PROGRAM

## 2023-12-27 PROCEDURE — 99223 1ST HOSP IP/OBS HIGH 75: CPT | Performed by: INTERNAL MEDICINE

## 2023-12-27 PROCEDURE — 93005 ELECTROCARDIOGRAM TRACING: CPT | Performed by: EMERGENCY MEDICINE

## 2023-12-27 PROCEDURE — 02HV33Z INSERTION OF INFUSION DEVICE INTO SUPERIOR VENA CAVA, PERCUTANEOUS APPROACH: ICD-10-PCS | Performed by: INTERNAL MEDICINE

## 2023-12-27 PROCEDURE — 2000000000 HC ICU R&B

## 2023-12-27 PROCEDURE — C1751 CATH, INF, PER/CENT/MIDLINE: HCPCS

## 2023-12-27 PROCEDURE — 99291 CRITICAL CARE FIRST HOUR: CPT | Performed by: INTERNAL MEDICINE

## 2023-12-27 PROCEDURE — 2500000003 HC RX 250 WO HCPCS: Performed by: EMERGENCY MEDICINE

## 2023-12-27 PROCEDURE — 6370000000 HC RX 637 (ALT 250 FOR IP): Performed by: INTERNAL MEDICINE

## 2023-12-27 PROCEDURE — 81001 URINALYSIS AUTO W/SCOPE: CPT

## 2023-12-27 PROCEDURE — 99285 EMERGENCY DEPT VISIT HI MDM: CPT

## 2023-12-27 PROCEDURE — 2580000003 HC RX 258: Performed by: INTERNAL MEDICINE

## 2023-12-27 PROCEDURE — 51702 INSERT TEMP BLADDER CATH: CPT

## 2023-12-27 PROCEDURE — 96374 THER/PROPH/DIAG INJ IV PUSH: CPT

## 2023-12-27 PROCEDURE — 74018 RADEX ABDOMEN 1 VIEW: CPT

## 2023-12-27 RX ORDER — GLUCAGON 1 MG/ML
1 KIT INJECTION PRN
Status: DISCONTINUED | OUTPATIENT
Start: 2023-12-27 | End: 2023-12-28 | Stop reason: HOSPADM

## 2023-12-27 RX ORDER — 0.9 % SODIUM CHLORIDE 0.9 %
1000 INTRAVENOUS SOLUTION INTRAVENOUS ONCE
Status: COMPLETED | OUTPATIENT
Start: 2023-12-27 | End: 2023-12-27

## 2023-12-27 RX ORDER — ACETAMINOPHEN 325 MG/1
650 TABLET ORAL EVERY 6 HOURS PRN
Status: DISCONTINUED | OUTPATIENT
Start: 2023-12-27 | End: 2023-12-28 | Stop reason: HOSPADM

## 2023-12-27 RX ORDER — IPRATROPIUM BROMIDE AND ALBUTEROL SULFATE 2.5; .5 MG/3ML; MG/3ML
1 SOLUTION RESPIRATORY (INHALATION)
Status: DISCONTINUED | OUTPATIENT
Start: 2023-12-27 | End: 2023-12-27

## 2023-12-27 RX ORDER — IPRATROPIUM BROMIDE AND ALBUTEROL SULFATE 2.5; .5 MG/3ML; MG/3ML
1 SOLUTION RESPIRATORY (INHALATION)
Status: DISCONTINUED | OUTPATIENT
Start: 2023-12-27 | End: 2023-12-28 | Stop reason: HOSPADM

## 2023-12-27 RX ORDER — WATER 10 ML/10ML
INJECTION INTRAMUSCULAR; INTRAVENOUS; SUBCUTANEOUS
Status: COMPLETED
Start: 2023-12-27 | End: 2023-12-27

## 2023-12-27 RX ORDER — LORAZEPAM 2 MG/ML
1 INJECTION INTRAMUSCULAR
Status: DISCONTINUED | OUTPATIENT
Start: 2023-12-27 | End: 2023-12-28 | Stop reason: HOSPADM

## 2023-12-27 RX ORDER — IPRATROPIUM BROMIDE AND ALBUTEROL SULFATE 2.5; .5 MG/3ML; MG/3ML
3 SOLUTION RESPIRATORY (INHALATION) ONCE
Status: COMPLETED | OUTPATIENT
Start: 2023-12-27 | End: 2023-12-27

## 2023-12-27 RX ORDER — DEXTROSE AND SODIUM CHLORIDE 5; .9 G/100ML; G/100ML
INJECTION, SOLUTION INTRAVENOUS CONTINUOUS
Status: DISCONTINUED | OUTPATIENT
Start: 2023-12-27 | End: 2023-12-28 | Stop reason: HOSPADM

## 2023-12-27 RX ORDER — ENOXAPARIN SODIUM 100 MG/ML
40 INJECTION SUBCUTANEOUS DAILY
Status: DISCONTINUED | OUTPATIENT
Start: 2023-12-27 | End: 2023-12-28 | Stop reason: HOSPADM

## 2023-12-27 RX ORDER — FENTANYL CITRATE 50 UG/ML
25 INJECTION, SOLUTION INTRAMUSCULAR; INTRAVENOUS ONCE
Status: COMPLETED | OUTPATIENT
Start: 2023-12-27 | End: 2023-12-27

## 2023-12-27 RX ORDER — MEROPENEM 1 G/1
1000 INJECTION, POWDER, FOR SOLUTION INTRAVENOUS ONCE
Status: COMPLETED | OUTPATIENT
Start: 2023-12-27 | End: 2023-12-27

## 2023-12-27 RX ORDER — LORAZEPAM 2 MG/ML
2 INJECTION INTRAMUSCULAR
Status: DISCONTINUED | OUTPATIENT
Start: 2023-12-27 | End: 2023-12-28 | Stop reason: HOSPADM

## 2023-12-27 RX ORDER — SODIUM CHLORIDE 0.9 % (FLUSH) 0.9 %
5-40 SYRINGE (ML) INJECTION EVERY 12 HOURS SCHEDULED
Status: DISCONTINUED | OUTPATIENT
Start: 2023-12-27 | End: 2023-12-28 | Stop reason: HOSPADM

## 2023-12-27 RX ORDER — LORAZEPAM 2 MG/ML
0.5 INJECTION INTRAMUSCULAR ONCE
Status: COMPLETED | OUTPATIENT
Start: 2023-12-27 | End: 2023-12-27

## 2023-12-27 RX ORDER — SODIUM CHLORIDE 0.9 % (FLUSH) 0.9 %
5-40 SYRINGE (ML) INJECTION PRN
Status: DISCONTINUED | OUTPATIENT
Start: 2023-12-27 | End: 2023-12-28 | Stop reason: HOSPADM

## 2023-12-27 RX ORDER — DEXTROSE MONOHYDRATE 100 MG/ML
INJECTION, SOLUTION INTRAVENOUS CONTINUOUS PRN
Status: DISCONTINUED | OUTPATIENT
Start: 2023-12-27 | End: 2023-12-28 | Stop reason: HOSPADM

## 2023-12-27 RX ORDER — DEXTROSE MONOHYDRATE 100 MG/ML
INJECTION, SOLUTION INTRAVENOUS
Status: COMPLETED
Start: 2023-12-27 | End: 2023-12-27

## 2023-12-27 RX ORDER — SODIUM CHLORIDE 9 MG/ML
INJECTION, SOLUTION INTRAVENOUS PRN
Status: DISCONTINUED | OUTPATIENT
Start: 2023-12-27 | End: 2023-12-28 | Stop reason: HOSPADM

## 2023-12-27 RX ORDER — ONDANSETRON 2 MG/ML
4 INJECTION INTRAMUSCULAR; INTRAVENOUS EVERY 6 HOURS PRN
Status: DISCONTINUED | OUTPATIENT
Start: 2023-12-27 | End: 2023-12-28 | Stop reason: HOSPADM

## 2023-12-27 RX ORDER — SODIUM CHLORIDE 9 MG/ML
INJECTION, SOLUTION INTRAVENOUS CONTINUOUS
Status: DISCONTINUED | OUTPATIENT
Start: 2023-12-27 | End: 2023-12-27

## 2023-12-27 RX ORDER — IPRATROPIUM BROMIDE AND ALBUTEROL SULFATE 2.5; .5 MG/3ML; MG/3ML
1 SOLUTION RESPIRATORY (INHALATION) EVERY 4 HOURS PRN
Status: DISCONTINUED | OUTPATIENT
Start: 2023-12-27 | End: 2023-12-28 | Stop reason: HOSPADM

## 2023-12-27 RX ORDER — ONDANSETRON 4 MG/1
4 TABLET, ORALLY DISINTEGRATING ORAL EVERY 8 HOURS PRN
Status: DISCONTINUED | OUTPATIENT
Start: 2023-12-27 | End: 2023-12-28 | Stop reason: HOSPADM

## 2023-12-27 RX ORDER — POLYETHYLENE GLYCOL 3350 17 G/17G
17 POWDER, FOR SOLUTION ORAL DAILY PRN
Status: DISCONTINUED | OUTPATIENT
Start: 2023-12-27 | End: 2023-12-28 | Stop reason: HOSPADM

## 2023-12-27 RX ORDER — SODIUM CHLORIDE 9 MG/ML
INJECTION, SOLUTION INTRAVENOUS CONTINUOUS
Status: ACTIVE | OUTPATIENT
Start: 2023-12-27 | End: 2023-12-27

## 2023-12-27 RX ORDER — ONDANSETRON 2 MG/ML
4 INJECTION INTRAMUSCULAR; INTRAVENOUS ONCE
Status: COMPLETED | OUTPATIENT
Start: 2023-12-27 | End: 2023-12-27

## 2023-12-27 RX ORDER — PANTOPRAZOLE SODIUM 40 MG/10ML
40 INJECTION, POWDER, LYOPHILIZED, FOR SOLUTION INTRAVENOUS DAILY
Status: DISCONTINUED | OUTPATIENT
Start: 2023-12-27 | End: 2023-12-28 | Stop reason: HOSPADM

## 2023-12-27 RX ORDER — ACETAMINOPHEN 650 MG/1
650 SUPPOSITORY RECTAL EVERY 6 HOURS PRN
Status: DISCONTINUED | OUTPATIENT
Start: 2023-12-27 | End: 2023-12-28 | Stop reason: HOSPADM

## 2023-12-27 RX ADMIN — WATER 125 MG: 1 INJECTION INTRAMUSCULAR; INTRAVENOUS; SUBCUTANEOUS at 04:15

## 2023-12-27 RX ADMIN — MEROPENEM 1000 MG: 1 INJECTION, POWDER, FOR SOLUTION INTRAVENOUS at 17:28

## 2023-12-27 RX ADMIN — ENOXAPARIN SODIUM 40 MG: 100 INJECTION SUBCUTANEOUS at 16:58

## 2023-12-27 RX ADMIN — SODIUM CHLORIDE 1000 ML: 9 INJECTION, SOLUTION INTRAVENOUS at 07:32

## 2023-12-27 RX ADMIN — SODIUM CHLORIDE: 9 INJECTION, SOLUTION INTRAVENOUS at 12:53

## 2023-12-27 RX ADMIN — MUPIROCIN: 20 OINTMENT TOPICAL at 19:40

## 2023-12-27 RX ADMIN — HYDROCORTISONE SODIUM SUCCINATE 100 MG: 100 INJECTION, POWDER, FOR SOLUTION INTRAMUSCULAR; INTRAVENOUS at 15:10

## 2023-12-27 RX ADMIN — NOREPINEPHRINE BITARTRATE 10 MCG/MIN: 1 INJECTION, SOLUTION, CONCENTRATE INTRAVENOUS at 08:48

## 2023-12-27 RX ADMIN — WATER 20 ML: 1 INJECTION INTRAMUSCULAR; INTRAVENOUS; SUBCUTANEOUS at 05:35

## 2023-12-27 RX ADMIN — SODIUM CHLORIDE 1000 ML: 9 INJECTION, SOLUTION INTRAVENOUS at 05:36

## 2023-12-27 RX ADMIN — LORAZEPAM 2 MG: 2 INJECTION INTRAMUSCULAR; INTRAVENOUS at 19:17

## 2023-12-27 RX ADMIN — DEXTROSE MONOHYDRATE 125 ML: 100 INJECTION, SOLUTION INTRAVENOUS at 17:01

## 2023-12-27 RX ADMIN — SODIUM CHLORIDE: 9 INJECTION, SOLUTION INTRAVENOUS at 18:59

## 2023-12-27 RX ADMIN — ONDANSETRON 4 MG: 2 INJECTION INTRAMUSCULAR; INTRAVENOUS at 08:43

## 2023-12-27 RX ADMIN — IOMEPROL INJECTION 75 ML: 714 INJECTION, SOLUTION INTRAVASCULAR at 05:42

## 2023-12-27 RX ADMIN — VANCOMYCIN HYDROCHLORIDE 750 MG: 1 INJECTION, POWDER, LYOPHILIZED, FOR SOLUTION INTRAVENOUS at 08:57

## 2023-12-27 RX ADMIN — HYDROMORPHONE HYDROCHLORIDE 1 MG: 1 INJECTION, SOLUTION INTRAMUSCULAR; INTRAVENOUS; SUBCUTANEOUS at 20:03

## 2023-12-27 RX ADMIN — PANTOPRAZOLE SODIUM 40 MG: 40 INJECTION, POWDER, FOR SOLUTION INTRAVENOUS at 15:10

## 2023-12-27 RX ADMIN — FENTANYL CITRATE 25 MCG: 50 INJECTION INTRAMUSCULAR; INTRAVENOUS at 05:35

## 2023-12-27 RX ADMIN — VASOPRESSIN 0.04 UNITS/MIN: 20 INJECTION INTRAVENOUS at 19:03

## 2023-12-27 RX ADMIN — LORAZEPAM 0.5 MG: 2 INJECTION INTRAMUSCULAR; INTRAVENOUS at 10:41

## 2023-12-27 RX ADMIN — MEROPENEM 1000 MG: 1 INJECTION, POWDER, FOR SOLUTION INTRAVENOUS at 05:35

## 2023-12-27 RX ADMIN — SODIUM CHLORIDE 1000 ML: 9 INJECTION, SOLUTION INTRAVENOUS at 08:54

## 2023-12-27 RX ADMIN — DEXTROSE AND SODIUM CHLORIDE: 5; 900 INJECTION, SOLUTION INTRAVENOUS at 18:30

## 2023-12-27 RX ADMIN — Medication 10 ML: at 19:39

## 2023-12-27 RX ADMIN — MUPIROCIN: 20 OINTMENT TOPICAL at 15:08

## 2023-12-27 RX ADMIN — IPRATROPIUM BROMIDE AND ALBUTEROL SULFATE 3 DOSE: .5; 2.5 SOLUTION RESPIRATORY (INHALATION) at 04:15

## 2023-12-27 RX ADMIN — NOREPINEPHRINE BITARTRATE 5 MCG/MIN: 1 INJECTION, SOLUTION, CONCENTRATE INTRAVENOUS at 15:12

## 2023-12-27 ASSESSMENT — PAIN DESCRIPTION - DESCRIPTORS
DESCRIPTORS: DISCOMFORT

## 2023-12-27 ASSESSMENT — PAIN DESCRIPTION - PAIN TYPE
TYPE: ACUTE PAIN
TYPE: ACUTE PAIN

## 2023-12-27 ASSESSMENT — PAIN SCALES - GENERAL
PAINLEVEL_OUTOF10: 7
PAINLEVEL_OUTOF10: 0
PAINLEVEL_OUTOF10: 0
PAINLEVEL_OUTOF10: 3
PAINLEVEL_OUTOF10: 7
PAINLEVEL_OUTOF10: 0

## 2023-12-27 ASSESSMENT — PAIN - FUNCTIONAL ASSESSMENT
PAIN_FUNCTIONAL_ASSESSMENT: NONE - DENIES PAIN
PAIN_FUNCTIONAL_ASSESSMENT: 0-10

## 2023-12-27 ASSESSMENT — PAIN DESCRIPTION - LOCATION
LOCATION: ABDOMEN

## 2023-12-27 ASSESSMENT — HEART SCORE: ECG: 1

## 2023-12-27 NOTE — H&P
Admission H & P    Armin Obando;  MRN: [id number] ;   Admit Date: 12/27/2023  3:07 AM   Current Date and Time: 12/27/2023 2:46 PM    PCP  --  Bruce Taylor MD         Chief Complaint   Patient presents with    Nausea     C/O nausea and abdominal pain.  Pt has chronic COPD on NRBR and NC at home         HPI:    68 y.o. female with PMH of PAF, DM, HTN, HLD, GERD, CHF, ILD, chronic resp failure, hypothyroidism, depression, anxiety, and recent COVID who presented to [facility] ED with epigastric abd pain and nausea /emesis for few days     Reports no chest pain but feels overall weak and tired . Activity limited with dyspnea.     Workup showed severe hypotension and was admitted to ICU for fluid resuscitation         Allergies   Allergen Reactions    Penicillins Anaphylaxis     Tolerates Meropenem.    Cefuroxime      Tolerates Meropenem.    Doxycycline Hives    Imitrex [Sumatriptan] Other (See Comments)     Feels like pins and needles    Meperidine     Sulfa Antibiotics Hives    Keflex [Cephalexin] Itching and Rash     Tolerates Meropenem.    Tape [Adhesive Tape] Rash       Past Medical History:   Diagnosis Date    A-fib (HCC)     Acute cystitis without hematuria     Anxiety     CHF (congestive heart failure) (HCC)     COPD (chronic obstructive pulmonary disease) (HCC)     Cryptogenic organizing pneumonia (HCC)     Depression     Diabetes mellitus (HCC)     GERD (gastroesophageal reflux disease)     Hyperlipidemia     Hypertension     ILD (interstitial lung disease) (HCC)     On home oxygen therapy     uses O2 3L prn    Rash     Thyroid disease     hypothyroidism      Past Surgical History:   Procedure Laterality Date    ARM SURGERY Left 3/2/2020    LEFT ULNAR NERVE DECOMPRESSION AT THE ELBOW performed by Clifford Macedo MD at [facility] ASC OR    BRONCHOSCOPY N/A 6/27/2019    EBUS WF W/ANES. performed by Venkatesh Cool MD at [facility] SSU ENDOSCOPY    BRONCHOSCOPY  6/27/2019    BRONCHOSCOPY/TRANSBRONCHIAL NEEDLE BIOPSY  AM    CO2 21 12/27/2023 03:38 AM    BUN 20 12/27/2023 03:38 AM    CREATININE 1.2 12/27/2023 03:38 AM    CALCIUM 9.0 12/27/2023 03:38 AM     Coagulation:   Lab Results   Component Value Date/Time    INR 2.28 12/27/2023 01:00 PM    APTT 35.9 09/27/2023 04:41 AM     Cardiac markers:   Lab Results   Component Value Date/Time    CKTOTAL 23 07/10/2019 04:11 PM    TROPONINI <0.01 04/17/2023 06:53 PM     ABGs: No results found for: \"POCPH\", \"POCPCO2\", \"POCPO2\", \"POCHCO3\", \"POCTCO2\", \"POCFIO2\"    Lab Results   Component Value Date    ALT 18 12/27/2023    AST 56 (H) 12/27/2023    ALKPHOS 75 12/27/2023    BILITOT 0.4 12/27/2023       Lab Results   Component Value Date    INR 2.28 (H) 12/27/2023    INR 1.31 (H) 09/27/2023    INR 1.05 09/24/2023    PROTIME 25.0 (H) 12/27/2023    PROTIME 16.2 (H) 09/27/2023    PROTIME 13.7 09/24/2023       CT CHEST ABDOMEN PELVIS W CONTRAST Additional Contrast? None   Final Result   Cardiomegaly with reflux of contrast into the IVC and hepatic veins   suggestive of right heart dysfunction. Severe emphysema. New fat stranding in the peripancreatic region and along the course of the   common bile duct which may be related to acute pancreatitis with other   etiologies such is cholangitis not entirely excluded. Clinical correlation   advised. XR ABDOMEN (KUB) (SINGLE AP VIEW)   Final Result   No evidence of acute abnormality of the abdomen or pelvis. XR CHEST PORTABLE   Final Result   Pulmonary emphysema and chronic interstitial lung disease with suspected mild   pneumonia in the right infrahilar lung.          IR PICC WO SQ PORT/PUMP > 5 YEARS    (Results Pending)   US GALLBLADDER RUQ    (Results Pending)   XR CHEST PORTABLE    (Results Pending)           ASSESMENT & PLAN:       Epigastric abd pain  Nausea /vomiting/diarrhea  - etiology unclear, cannot rule out infectious etiology or symptoms related to her IPF meds  - CT with possible pancreatitis but lipase wnl  - cannot rule

## 2023-12-27 NOTE — PROGRESS NOTES
DATE:  12/27/2023  MEETS for Devices with Indicators:    [x] Indwelling Cath: # 2   Urinary Catheter 12/27/23 Cisneros-Temperature     [x] Central Lines  A double lumen catheterPICC : #7    PICC Double Lumen 12/27/23 Right Basilic      ASSESSMENT: Per Dr Sanchez's Note: 12/27/2023  Acute on chronic hypoxemic & hypercapnic respiratory failure   Abdominal pain with abnormal imaging, cholangitis vs pancreatitis vs other   Shock, favor septic shock  Lactic acidosis   Severe pulmonary emphysema   Progressive ILD: h/o COPD, possible NSIP v Chronic HP on biopsy, f/b Al Zoby, tx with pirfenidone  Paroxysmal atrial fibrillation not on anticoagulation 2/2 recurrent GI bleeding   Pulmonary hypertension-ILD, WHO group 3  Never smoker     PLAN: Per Dr Sanchez's Note: 12/27/2023  Bilevel non-invasive positive pressure ventilation for life threatening respiratory failure   IV fluid resuscitation with crystalloid completed in eD  Merrem/Vanc D#1  RUQ U/S to rule out biliary tree obstruction  Gastroenterology   Hydrocortisone for suspected adrenal insufficiency  CVC for pressors  Levophed for MAP 65  F/U Lactate  Prophylaxis: lovenox, bactroban, protonix       []  Acute Urinary Retention or Bladder Outlet Obstruction or Irrigation    [] Urinary Retention :  []Bladder Scan volume: 250mls or more  []Medications used for Intubation:  -Anesthesia (Versed), Paralytics (Rocuronium)  []Neurogenic Bladder: Nerve Damage    [] Acute Bladder Outlet Obstruction:  []Prostate Enlargement  []Blood Clots, Hematuria  []Urethral Compression      []Other: Please Specify:     2. [x]  Need for Accurate Measurements of Urinary Output in Critically Ill  Patients  Anticipated to Require Large Volume Infusions or Diuretics or Vasopressors / Inotropes et al:    [x]Large Volume of boluses of fluid for resuscitation  [x]Hemodynamic Instability: receiving IV: Vasopressors & Inotropes EX: Dopamine, Epinephrine, Norepinephrine, Phenylephrine, Vasopressin,  Digoxin  []High-dose Diuretics  []Hourly Urine studies to measure life threatening laboratory abnormalities  [x]Other: Please Specify:  See Inserted note from Dr Naomi Wade note on 12/27/23    3. [] Need for Treatment of Wounds:  Open Sacral and/or Perineal Wounds  Promote Healing for Incontinent Patient with the following wounds: (Please select a Stage and/or use 'Other'):    []Stage II: Loss of dermis, shallow open ulcer with a red/pink wound bed  []Stage III: Subcutaneous fat may be visible   []Stage IV: Exposed bone, tendon, or muscle  []Deep Tissue Injury: Non-blanchable, purple or maroon  []Unstageable: Base of wound is covered by dead tissue  []Lower Extremity Burns of an incontinent patient   []Enterostomal therapy   []Other: Please Specify     4. [] Strict Prolonged Immobilization  Patient requires prolonged immobilization (e.g., potentially unstable thoracic or lumbar spine, multiple traumatic injuries:    []Trauma: to the hip bones, sacrum, or coccyx  []Pelvic: Fractures  []Spinal: Cord Injuries   []Other: Please Specify    5. [] Comfort for end-of-life care  To improve comfort for end-of-life care if needed:    []Hospice et al  []Other: Please Specify     6. [] Selected Surgical Procedures  Selected Nelia-Operative Needs:    []Urologic surgery   []Contiguous (adjacent) structures of the genitourinary tract, surgery  []Anticipated prolonged duration of surgery (Note: catheters placed for this reason should be removed in PACU)  []Large volume infusions or diuretics anticipated during surgery  []Need for intraoperative monitoring of urinary output  []Other: Please Specify     [] Hemodialysis     []Other: Please Specify     [] Limited Access: Inadequate peripheral venous access     []Other: Please Specify     3.  [] Long Term IV Antibiotic Therapy 14 days or longer:    []Acyclovir    []Ampicillin sodium    []Azithromycin    []Cefazolin    []Ciprofloxacin    []Clindamycin    []Gentamicin

## 2023-12-27 NOTE — PROGRESS NOTES
Pt admitted into ICU 15 from [address] [place] [address]. Pt alert/oriented x 4. Following commands. SR 87, /90, SpO2 97 %on bipap 14/7 FiO2 55%. 2 PIVs, WNL, with levophed 20 mcg/min infusing. Call light within reach. Bed locked in lowest position. Bed alarm turned on. Chlorhexadine bath given. 4 Eyes Skin Assessment     The patient is being assess for   Admission    I agree that 2 RN's have performed a thorough Head to Toe Skin Assessment on the patient. ALL assessment sites listed below have been assessed. Areas assessed by both nurses:   [x]   Head, Face, and Ears   [x]   Shoulders, Back, and Chest, Abdomen  [x]   Arms, Elbows, and Hands   [x]   Coccyx, Sacrum, and Ischium  [x]   Legs, Feet, and Heels        Redness on coccyx blanchable. Under breasts slightly red. Feet and legs slightly mottled. **SHARE this note so that the co-signing nurse is able to place an eSignature**    Co-signer eSignature: Electronically signed by Sawyer Jones RN on 12/27/23 at 12:14 PM EST    Does the Patient have Skin Breakdown?   No          Amos Prevention initiated:  Yes   Wound Care Orders initiated:  N/A      WOC nurse consulted for Pressure Injury (Stage 3,4, Unstageable, DTI, NWPT, Complex wounds)and New or Established Ostomies:  NA      Primary Nurse eSignature: Electronically signed by Dani Harper RN on 12/27/23 at 1:56 PM EST

## 2023-12-27 NOTE — ED NOTES
Spoke with [employer] from [address] [place] [address]. Will contact palliative care section and have someone call back.

## 2023-12-27 NOTE — ED PROVIDER NOTES
[facility] EMERGENCY DEPARTMENT  EMERGENCY DEPARTMENT ENCOUNTER      Pt Name: Armin Obando  MRN: [id number]  Birthdate 1955  Date of evaluation: 12/27/2023  Provider: Kulwant Morse MD    CHIEF COMPLAINT       Chief Complaint   Patient presents with   • Nausea     C/O nausea and abdominal pain.  Pt has chronic COPD on NRBR and NC at home         HISTORY OF PRESENT ILLNESS   (Location/Symptom, Timing/Onset, Context/Setting, Quality, Duration, Modifying Factors, Severity)  Note limiting factors.   Armin Obando is a 68 y.o. female who presents to the emergency department with the chief complaint of   Chief Complaint   Patient presents with   • Nausea     C/O nausea and abdominal pain.  Pt has chronic COPD on NRBR and NC at home   . 68-year-old female with past medical history significant for end-stage COPD, on home oxygen, currently on hospice with [facility], pulmonary fibrosis, interstitial lung disease, generalized anxiety disorder, acute on chronic respiratory failure, protein malnutrition, hyperglycemia, paroxysmal atrial fibrillation not on anticoagulation secondary to GI bleed, diabetes, hypertension, high cholesterol, GERD, CHF, hypothyroidism.  Presents to the ER from home via EMS for evaluation of abdominal pain and shortness of breath.  EMS states when they arrived on patient was noted to be hypoxic 75% on her oxygen cannula titrated.  Patient was provided 15 L via nonrebreather and had improvement of pulse ox to 96%.  Patient arrives here sedated but answering questions intermittently and appropriately.  Patient seems more irritated with the questions then altered.  Patient complains of abdominal pain, diffusely.  Patient describes nausea.  Patient denies vomiting, diarrhea, urinary symptoms,, orthopnea.  Patient appears extremely debilitated, patient requesting multiple times to be \"knocked out\"      Nursing Notes were reviewed.    REVIEW OF SYSTEMS    (2-9 systems for level 4,  10 or more for level 5)     Review of Systems    Except as noted above the remainder of the review of systems was reviewed and negative.       PAST MEDICAL HISTORY     Past Medical History:   Diagnosis Date   • A-fib (HCC)    • Acute cystitis without hematuria    • Anxiety    • CHF (congestive heart failure) (HCC)    • COPD (chronic obstructive pulmonary disease) (HCC)    • Cryptogenic organizing pneumonia (HCC)    • Depression    • Diabetes mellitus (HCC)    • GERD (gastroesophageal reflux disease)    • Hyperlipidemia    • Hypertension    • ILD (interstitial lung disease) (HCC)    • On home oxygen therapy     uses O2 3L prn   • Rash    • Thyroid disease     hypothyroidism         SURGICAL HISTORY       Past Surgical History:   Procedure Laterality Date   • ARM SURGERY Left 3/2/2020    LEFT ULNAR NERVE DECOMPRESSION AT THE ELBOW performed by Clifford Macedo MD at [facility] OR   • BRONCHOSCOPY N/A 6/27/2019    EBUS WF W/ANES. performed by Venkatesh Cool MD at [facility] ENDOSCOPY   • BRONCHOSCOPY  6/27/2019    BRONCHOSCOPY/TRANSBRONCHIAL NEEDLE BIOPSY performed by Venkatesh Cool MD at [facility] ENDOSCOPY   • BRONCHOSCOPY  6/27/2019    BRONCHOSCOPY/TRANSBRONCHIAL LUNG BIOPSY performed by Venkatesh Cool MD at [facility] ENDOSCOPY   • BRONCHOSCOPY  6/27/2019    BRONCHOSCOPY ALVEOLAR LAVAGE performed by Venkatesh Cool MD at [facility] ENDOSCOPY   • BRONCHOSCOPY  07/10/2019   • BRONCHOSCOPY N/A 7/10/2019    BRONCHOSCOPY ALVEOLAR LAVAGE performed by Terrell Boogie MD at [facility] ENDOSCOPY   • BRONCHOSCOPY Bilateral 08/06/2019   • BRONCHOSCOPY N/A 8/6/2019    BRONCHOSCOPY ALVEOLAR LAVAGE performed by Benson Sanchez MD at [facility] ENDOSCOPY   • BRONCHOSCOPY N/A 12/24/2019    BRONCHOSCOPY ALVEOLAR LAVAGE performed by Antony May MD at [facility] ENDOSCOPY   • CHOLECYSTECTOMY     • COLONOSCOPY N/A 8/25/2022    COLONOSCOPY POLYPECTOMY SNARE/COLD BIOPSY performed by Lydia Grier MD at [facility] ENDOSCOPY   • CT GUIDED CHEST TUBE   laterality, unspecified part of lung    3. Acute on chronic respiratory failure with hypoxia (HCC)    4. Elevated troponin    5. Septic shock ([address])    6. Hypothermia, initial encounter          DISPOSITION/PLAN   DISPOSITION Admitted 12/27/2023 09:32:47 AM      PATIENT REFERRED TO:  No follow-up provider specified. DISCHARGE MEDICATIONS:  New Prescriptions    No medications on file     Controlled Substances Monitoring:     RX Monitoring Periodic Controlled Substance Monitoring   2/28/2023   9:48 AM Possible medication side effects, risk of tolerance/dependence & alternative treatments discussed. ;Obtaining appropriate analgesic effect of treatment. (Please note that portions of this note were completed with a voice recognition program.  Efforts were made to edit the dictations but occasionally words are mis-transcribed. )    Lolis Marie MD (electronically signed)  Attending Emergency Physician           Lolis Marie MD  12/27/23 1208       Lolis Marie MD  12/28/23 9032

## 2023-12-27 NOTE — PROGRESS NOTES
During admission pt states she does not want intubation but does want everything else done. Dr. Jose Luis Stinson made aware.

## 2023-12-27 NOTE — ED NOTES
Pt to ambulance via cot with BIPAP and Levophed continued per order and without incident upon departure from ER.  Report called to [facility] RN @ [facility] ICU in SBAR format

## 2023-12-27 NOTE — PROGRESS NOTES
GI consult sent via perfect serve to [facility] as history is noted, Electronically signed by Mana Bedoya on 12/27/2023 at 1:28 PM

## 2023-12-27 NOTE — CONSULTS
Consultation Note    Patient Name: Frankie Barreto  : 1955  Age: 77 y.o. Admitting Physician: Carolann Samuels DO   Date of Admission: 2023  3:07 AM   Primary Care Physician: Kathy Jimenez MD      Please note consultation could not be completed today as patient no longer with us. MD Carlos Montana    [phone]. Also available via Perfect Serve    Please note that some or all of this record was generated using voice recognition software. If there are any questions about the content of this document, please contact the author as some errors in translation may have occurred.

## 2023-12-27 NOTE — ED NOTES
[facility] Radiology to check on CT result. Call center to put note in to get read.

## 2023-12-27 NOTE — PROGRESS NOTES
Safety guidelines and activity restrictions are discussed with the patient.  Verbalized understanding.  Sutures removed Steri-Strips applied.  Safety guidelines and activity restrictions discussed with the patient.  Verbalizes understanding.  Cautioned to keep wound clean and dry.  Cautioned to avoid heavy lifting pushing or pulling or forceful  with his left hand until follow-up with Dr. Pelayo.  Return to orthopedic clinic when you return from her vacation in follow-up with Dr. Pelayo or sooner as needed.   Handoff report and transfer of care given at bedside to [facility]. Patient in stable condition, denies needs/concerns at this time. Call light within reach.

## 2023-12-27 NOTE — CONSULTS
Pulmonary & Critical Care Consultation     Patient is being seen at the request of Dr. Jessica Medina for a consultation for shock     HISTORY OF PRESENT ILLNESS: 78 yo with severe pulmonary emphysema and progressive pulmonary fibrosis who presented to the ED with a 2 day h/o moderate diffuse abdominal pain associated with emesis. PAST MEDICAL HISTORY:  Past Medical History:   Diagnosis Date    A-fib ([address])     Acute cystitis without hematuria     Anxiety     CHF (congestive heart failure) (HCC)     COPD (chronic obstructive pulmonary disease) ([address])     Cryptogenic organizing pneumonia ([address])     Depression     Diabetes mellitus ([address])     GERD (gastroesophageal reflux disease)     Hyperlipidemia     Hypertension     ILD (interstitial lung disease) ([address])     On home oxygen therapy     uses O2 3L prn    Rash     Thyroid disease     hypothyroidism     PAST SURGICAL HISTORY:  Past Surgical History:   Procedure Laterality Date    ARM SURGERY Left 3/2/2020    LEFT ULNAR NERVE DECOMPRESSION AT THE ELBOW performed by Sarah Ferreira MD at [facility] [address] N/A 6/27/2019    EBUS WF W/ANES.  performed by Harpreet Gaona MD at [address] [place] [address]  6/27/2019    BRONCHOSCOPY/TRANSBRONCHIAL NEEDLE BIOPSY performed by Harpreet Gaona MD at [address] [place] [address]  6/27/2019    BRONCHOSCOPY/TRANSBRONCHIAL LUNG BIOPSY performed by Harpreet Gaona MD at [address] [place] [address]  6/27/2019    BRONCHOSCOPY ALVEOLAR LAVAGE performed by Harpreet Gaona MD at [address] [place] [address]  07/10/2019    BRONCHOSCOPY N/A 7/10/2019    BRONCHOSCOPY ALVEOLAR LAVAGE performed by Don Chew MD at [address] [place] [address] Bilateral 08/06/2019    BRONCHOSCOPY N/A 8/6/2019    BRONCHOSCOPY ALVEOLAR LAVAGE performed by Rain Ziegler MD at [address] [place] [address] N/A 12/24/2019    BRONCHOSCOPY ALVEOLAR LAVAGE performed by Machelle Cano MD at [address] [place] [address]    COLONOSCOPY N/A 8/25/2022    COLONOSCOPY POLYPECTOMY SNARE/COLD BIOPSY performed by Lydia Grier MD at [facility] ENDOSCOPY    CT GUIDED CHEST TUBE  9/15/2022    CT GUIDED CHEST TUBE 9/15/2022 [facility] CT SCAN    CT INSERT CATH PLEURA W IMAGE  11/28/2022    CT INSERT CATH PLEURA W IMAGE 11/28/2022 Maik Wesley MD [facility] CT SCAN    HYSTERECTOMY, TOTAL ABDOMINAL (CERVIX REMOVED)      partial    IR MIDLINE CATH  10/16/2023    IR MID[facility]       FAMILY HISTORY:  family history includes Asthma in her sister; Diabetes in her mother; High Blood Pressure in her mother; Other in her father.    SOCIAL HISTORY:   reports that she has never smoked. She has never used smokeless tobacco.    Scheduled Meds:   sodium chloride flush  5-40 mL IntraVENous 2 times per day    enoxaparin  40 mg SubCUTAneous Daily    meropenem  1,000 mg IntraVENous Q12H    [START ON 12/28/2023] vancomycin  1,000 mg IntraVENous Q24H     Continuous Infusions:   norepinephrine 20 mcg/min (12/27/23 1109)    sodium chloride      sodium chloride 75 mL/hr at 12/27/23 1253     PRN Meds:  sodium chloride flush, sodium chloride, ondansetron **OR** ondansetron, polyethylene glycol, acetaminophen **OR** acetaminophen    ALLERGIES:  Patient is allergic to penicillins, cefuroxime, doxycycline, imitrex [sumatriptan], meperidine, sulfa antibiotics, keflex [cephalexin], and tape [adhesive tape].    REVIEW OF SYSTEMS:  Constitutional: Negative for fever  HENT: Negative for sore throat  Eyes: Negative for redness   Respiratory: +for dyspnea, cough  Cardiovascular: Negative for chest pain  Gastrointestinal: + vomiting  Genitourinary: Negative for hematuria   Musculoskeletal: Negative for arthralgias   Skin: Negative for rash  Neurological: Negative for syncope  Hematological: Negative for adenopathy  Psychiatric/Behavorial: + for anxiety    PHYSICAL EXAM:  Blood pressure (!) 104/90, pulse 88, temperature 97.1 °F (36.2 °C), temperature

## 2023-12-27 NOTE — PROGRESS NOTES
Pt not being cooperative. Pt pulse ox and BP cuff not reading well d/t pt positioning. Pt will not go into a different position she is curling up and it will not take accurately.

## 2023-12-28 ENCOUNTER — TELEPHONE (OUTPATIENT)
Dept: FAMILY MEDICINE CLINIC | Age: 68
End: 2023-12-28

## 2023-12-28 PROBLEM — A41.9 SEPTIC SHOCK (HCC): Status: ACTIVE | Noted: 2023-12-27

## 2023-12-28 PROBLEM — R65.21 SEPTIC SHOCK (HCC): Status: ACTIVE | Noted: 2023-01-01

## 2023-12-28 LAB — CORTIS SERPL-MCNC: 36.8 UG/DL

## 2023-12-28 NOTE — PROGRESS NOTES
CTSP for refractory hypotension. Patient more agitated/restless. Requiring increasing dose levophed. ABG with severe and worsening metabolic acidosis. Despite aggressive supportive measures, patient is clearly worsening and I believe death is likely imminent. I have communicated this with the patient and her daughter. Decision for no further procedures. Continue current vasopressors and fluids, but will not add 3rd vasopressor. Will use dilaudid and ativan if needed for comfort. DNR-CC. Total critical care time caring for this patient with life threatening, unstable organ failure, including direct patient contact, management of life support systems, review of data including imaging and labs, discussions with other team members and physicians is 95 minutes so far today, excluding procedures.

## 2023-12-28 NOTE — PLAN OF CARE
Problem: Safety - Adult  Goal: Free from fall injury  Outcome: Progressing     Problem: Skin/Tissue Integrity  Goal: Absence of new skin breakdown  Description: 1. Monitor for areas of redness and/or skin breakdown  2. Assess vascular access sites hourly  3. Every 4-6 hours minimum:  Change oxygen saturation probe site  4. Every 4-6 hours:  If on nasal continuous positive airway pressure, respiratory therapy assess nares and determine need for appliance change or resting period.   Outcome: Progressing     Problem: Chronic Conditions and Co-morbidities  Goal: Patient's chronic conditions and co-morbidity symptoms are monitored and maintained or improved  Outcome: Progressing     Problem: Respiratory - Adult  Goal: Achieves optimal ventilation and oxygenation  Outcome: Progressing     Problem: Discharge Planning  Goal: Discharge to home or other facility with appropriate resources  Note: Pt code status changed to DNR CC     Problem: Pain  Goal: Verbalizes/displays adequate comfort level or baseline comfort level  Flowsheets (Taken 12/27/2023 2049)  Verbalizes/displays adequate comfort level or baseline comfort level:   Assess pain using appropriate pain scale   Administer analgesics based on type and severity of pain and evaluate response   Implement non-pharmacological measures as appropriate and evaluate response  Note: CC meds

## 2023-12-28 NOTE — PROGRESS NOTES
12/27/23 2048   Encounter Summary   Encounter Overview/Reason  Spiritual/Emotional Needs;Grief, Loss, and Adjustments   Service Provided For: Patient and family together   Referral/Consult From: Nurse   Support System Spouse; Children   Last Encounter  12/27/23  ( provided pastoral care at possible EOL; commendation and prayer with family)   Complexity of Encounter Moderate   Begin Time 1945   End Time  2045   Total Time Calculated 60 min   Spiritual/Emotional needs   Type Spiritual Support   Grief, Loss, and Adjustments   Type End of Life; Anticipatory Grief   Assessment/Intervention/Outcome   Assessment Fearful;Tearful   Intervention Active listening;Nurtured Hope;Prayer (assurance of)/[place]   Outcome Comfort;Expressed Gratitude

## 2023-12-28 NOTE — PROGRESS NOTES
The plan for this patient after Dr. Emilio Rivera spoke with family:  -change code status to [facility]  - give 1 L bolus  - continue vaso and levo   -do not start another pressor even if needed  - start comfort meds for anxiety and pain  - continue maintenance fluids    Report given to MICK Reed and this was all told to her. Daughter at bedside.  is on his way from work in [facility].

## 2023-12-28 NOTE — PROGRESS NOTES
Initial [facility] call placed. Instructed to call back one hr post cardiac death to get referral started.      Electronically signed by Greg Hidalgo RN on 12/27/2023 at 10:45 PM

## 2023-12-28 NOTE — TELEPHONE ENCOUNTER
Marii from [address] [place] [address] called to inform our office that the patient had passed away on 12/27/2023.

## 2023-12-28 NOTE — SIGNIFICANT EVENT
Called to see patient for unresponsiveness. On exam the patient did not respond to verbal or physical stimuli. Absent heart and breath sounds . Absent peripheral pulses. Pupils are fixed and dilated. Patient pronounced dead at 22:58 H.  Next of kin/family at bedside

## 2023-12-28 NOTE — PROGRESS NOTES
Pts family came up to tell this RN they think she passed. This RN and Lora Shah, charge RN, assessed pt, HR 0, very thready and faint carotid pulse palpated. RR 0. O2 sat un-readable. Dr. Leonardo Resendiz called to bedside [phone] witnessed time of death. Pressors stopped and O2 removed. Family spending time at bedside with pt.  Will call [address] back in one hr.     Electronically signed by Raymundo Givens RN on 12/27/2023 at 11:00 PM

## 2023-12-31 LAB
BACTERIA BLD CULT ORG #2: NORMAL
BACTERIA BLD CULT: NORMAL

## 2024-01-05 NOTE — DISCHARGE SUMMARY
Discharge Summary    Date: 2024  Patient Name: Armin Obando    YOB: 1955     Age: 68 y.o.    Admit Date: 2023  Discharge Date: 2024  Discharge Condition:    Admission Diagnosis  Septicemia (HCC) [A41.9];Elevated troponin [R79.89];Hypothermia, initial encounter [T68.XXXA];Septic shock (HCC) [A41.9, R65.21];Acute on chronic respiratory failure (HCC) [J96.20];Acute on chronic respiratory failure with hypoxia (HCC) [J96.21];Pneumonia due to infectious organism, unspecified laterality, unspecified part of lung [J18.9];Acute respiratory failure with hypoxia (HCC) [J96.01]      Discharge Diagnosis  Principal Problem:    Acute on chronic respiratory failure (HCC)  Active Problems:    Septicemia (HCC)    Acute respiratory failure with hypoxia (HCC)    Elevated troponin    Septic shock (HCC)  Resolved Problems:    * No resolved hospital problems. *      Hospital Stay  Narrative of Hospital Course:  CTSP for refractory hypotension.  Patient more agitated/restless.  Requiring increasing dose levophed.  ABG with severe and worsening metabolic acidosis.  Despite aggressive supportive measures, patient is clearly worsening and I believe death is likely imminent.  I have communicated this with the patient and her daughter.  Decision for no further procedures.  Continue current vasopressors and fluids, but will not add 3rd vasopressor.  Will use dilaudid and ativan if needed for comfort.  DNR-CC.      Consultants:  IP CONSULT TO PULMONOLOGY  IP CONSULT TO GI  PHARMACY TO DOSE VANCOMYCIN  IP CONSULT TO PHARMACY    Surgeries/procedures Performed:      Treatments:            Discharge Plan/Disposition:      Hospital/Incidental Findings Requiring Follow Up:    Patient Instructions:    Diet:    Activity:  For number of days (if applicable):      Other Instructions:    Provider Follow-Up:   No follow-ups on file.     Significant Diagnostic Studies:    Recent Labs:  Admission on 2023,  R-3  Normal    predniSONE (DELTASONE) 10 MG tablet  TAKE 1 TABLET BY MOUTH EVERY DAY, Disp-30 tablet, R-0  Normal    traMADol (ULTRAM) 50 MG tablet  Take 1 tablet by mouth every 6 hours as needed for Pain.  Historical Med    nadolol (CORGARD) 20 MG tablet  TAKE 1 TABLET BY MOUTH EVERY DAY, Disp-90 tablet, R-1  Normal    ipratropium 0.5 mg-albuterol 2.5 mg (DUONEB) 0.5-2.5 (3) MG/3ML SOLN nebulizer solution  Inhale 3 mLs into the lungs in the morning and 3 mLs at noon and 3 mLs in the evening and 3 mLs before bedtime., Disp-1 each, R-0  NO PRINT    Pirfenidone 267 MG TABS  Take 1 tablet by mouth in the morning, at noon, and at bedtime pirfenidone daily for a week and then BID for a week and then back to TID., Disp-270 tablet, R-1  Normal    atorvastatin (LIPITOR) 40 MG tablet  TAKE 1 TABLET EVERY DAY, Disp-90 tablet, R-1  Normal    dilTIAZem (CARDIZEM CD) 240 MG extended release capsule  TAKE 1 CAPSULE BY MOUTH EVERY DAY, Disp-90 capsule, R-1  Normal    omeprazole (PRILOSEC) 40 MG delayed release capsule  TAKE 1 CAPSULE BY MOUTH EVERY DAY IN THE MORNING BEFORE BREAKFAST, Disp-90 capsule, R-1  Normal    ferrous sulfate (IRON 325) 325 (65 Fe) MG tablet  Take 1 tablet by mouth daily (with breakfast), Disp-60 tablet, R-1  Normal    hydrOXYzine HCl (ATARAX) 10 MG tablet  Take 1 tablet by mouth 3 times daily as needed for Itching, Disp-90 tablet, R-5  Normal    sertraline (ZOLOFT) 100 MG tablet  TAKE 2 TABLETS EVERY DAY, Disp-180 tablet, R-1  Normal    mirtazapine (REMERON) 30 MG tablet  Take 1 tablet by mouth nightly, Disp-90 tablet, R-1  Normal    potassium chloride (KLOR-CON M) 20 MEQ extended release tablet  Take 1 tablet by mouth daily, Disp-30 tablet, R-1  Normal    guaiFENesin (MUCINEX) 600 MG extended release tablet  Take 1 tablet by mouth as needed for Congestion  Historical Med    benzonatate (TESSALON) 200 MG capsule  TAKE 1 CAPSULE BY MOUTH 3 TIMES A DAY AS NEEDED FOR COUGH, Disp-90 capsule,

## 2024-01-05 NOTE — DEATH NOTES
Death Pronouncement Note  Patient's Name: Armin Obando   Patient's YOB: 1955  MRN Number: [id number]    Admitting Provider: Charlie Martines MD  Attending Provider: No att. providers found    Patient was examined and the following were absent: Pulses, Blood Pressure, and Respiratory effort    I declared the patient dead on 1/27/2024 at 10:58 PM    Preliminary Cause of Death: Acute respiratory failure with hypoxia (HCC)     Electronically signed by Jayde Doe MD on 1/5/24 at 9:24 AM EST

## 2024-01-30 NOTE — PLAN OF CARE
No risk alerts present Problem: Discharge Planning  Goal: Discharge to home or other facility with appropriate resources  Outcome: Progressing  Flowsheets (Taken 10/3/2023 2229)  Discharge to home or other facility with appropriate resources: Identify barriers to discharge with patient and caregiver     Problem: Skin/Tissue Integrity  Goal: Absence of new skin breakdown  Description: 1. Monitor for areas of redness and/or skin breakdown  2. Assess vascular access sites hourly  3. Every 4-6 hours minimum:  Change oxygen saturation probe site  4. Every 4-6 hours:  If on nasal continuous positive airway pressure, respiratory therapy assess nares and determine need for appliance change or resting period.   Outcome: Progressing     Problem: Respiratory - Adult  Goal: Achieves optimal ventilation and oxygenation  Outcome: Progressing  Flowsheets (Taken 10/3/2023 2229)  Achieves optimal ventilation and oxygenation:   Assess for changes in respiratory status   Assess for changes in mentation and behavior   Position to facilitate oxygenation and minimize respiratory effort     Problem: Pain  Goal: Verbalizes/displays adequate comfort level or baseline comfort level  Outcome: Progressing  Flowsheets (Taken 10/3/2023 2229)  Verbalizes/displays adequate comfort level or baseline comfort level:   Encourage patient to monitor pain and request assistance   Assess pain using appropriate pain scale     Problem: Safety - Adult  Goal: Free from fall injury  Outcome: Progressing  Flowsheets (Taken 10/3/2023 2229)  Free From Fall Injury: Instruct family/caregiver on patient safety     Problem: ABCDS Injury Assessment  Goal: Absence of physical injury  Outcome: Progressing  Flowsheets (Taken 9/30/2023 2240)  Absence of Physical Injury: Implement safety measures based on patient assessment     Problem: Chronic Conditions and Co-morbidities  Goal: Patient's chronic conditions and co-morbidity symptoms are monitored and maintained or improved  Outcome: 98

## 2024-01-31 RX ORDER — DILTIAZEM HYDROCHLORIDE 240 MG/1
CAPSULE, COATED, EXTENDED RELEASE ORAL
Qty: 90 CAPSULE | Refills: 1 | OUTPATIENT
Start: 2024-01-31

## 2024-02-01 RX ORDER — OMEPRAZOLE 40 MG/1
CAPSULE, DELAYED RELEASE ORAL
Qty: 90 CAPSULE | Refills: 1 | OUTPATIENT
Start: 2024-02-01

## 2024-07-17 ENCOUNTER — TELEPHONE (OUTPATIENT)
Dept: PULMONOLOGY | Age: 69
End: 2024-07-17

## 2024-08-20 NOTE — PROGRESS NOTES
[facility]     Gastroenterology Progress Note    Rene Maciel Patient Status:  Inpatient    1954 MRN [id number]   Location [facility] 5SW/SE Attending Tiffanie Verma,*   Hosp Day # 8 PCP Prasanth Chong IM Progress Note    Admit Date:  11/26/2022  2    Interval history:  hx of ILD, recurrent spontaneous pneumothorax s.p chest tube placement     Subjective:  Ms. Helena Tee seen up in bed, feels about the same, down to home o2 today  No chest pain       Objective:   /66   Pulse 90   Temp 98.3 °F (36.8 °C) (Oral)   Resp 26   Ht 5' 3\" (1.6 m)   Wt 107 lb 12.8 oz (48.9 kg)   SpO2 93%   BMI 19.10 kg/m²     Intake/Output Summary (Last 24 hours) at 11/28/2022 0842  Last data filed at 11/28/2022 0406  Gross per 24 hour   Intake 623.63 ml   Output 451 ml   Net 172.63 ml       Physical Exam:  General:  thin elderly female chronically ill appearing  Remains in fetal position on left lateral pain   Awake, alert and oriented. Appears to be not in any distress  Mucous Membranes:  Pink , anicteric  Neck: No JVD, no carotid bruit, no thyromegaly  Chest:  diminished in right side and kenny bases with scattered crackles , right sided lateral chest tube   With no subcut emphysema   Cardiovascular:  RRR S1S2 heard, no murmurs or gallops  Abdomen:  Soft, undistended, non tender, no organomegaly, BS present  Extremities: No edema or cyanosis.  Distal pulses well felt  Neurological : no focal deficits with gen weakness    Medications:   Scheduled Medications:    atorvastatin  40 mg Oral Daily    levothyroxine  125 mcg Oral Daily    metoprolol succinate  25 mg Oral Daily    mirtazapine  30 mg Oral Nightly    pantoprazole  40 mg Oral QAM AC    sertraline  200 mg Oral Daily    sodium chloride flush  5-40 mL IntraVENous 2 times per day    enoxaparin  30 mg SubCUTAneous Daily    insulin lispro  0-4 Units SubCUTAneous TID WC    insulin lispro  0-4 Units SubCUTAneous Nightly    potassium bicarb-citric acid  40 mEq Oral Once     I   amiodarone 0.5 mg/min (11/28/22 0523)    sodium chloride      dextrose       traMADol, guaiFENesin-dextromethorphan, morphine, metoprolol, albuterol, albuterol sulfate HFA, hydrOXYzine HCl, LORazepam, sodium chloride flush, sodium chloride, potassium chloride **OR** potassium alternative oral replacement **OR** potassium chloride, magnesium sulfate, ondansetron **OR** ondansetron, polyethylene glycol, acetaminophen **OR** acetaminophen, glucose, dextrose bolus **OR** dextrose bolus, glucagon (rDNA), dextrose    Lab Data:  Recent Labs     11/26/22 0720 11/27/22 0427 11/28/22  0444   WBC 7.1 6.5 10.2   HGB 11.0* 9.3* 9.2*   HCT 34.4* 29.6* 28.9*   MCV 78.7* 79.5* 79.7*    144 140     Recent Labs     11/26/22 0720 11/27/22 0426 11/28/22  0444    143 139   K 3.4* 4.0 3.1*   CL 97* 102 98*   CO2 33* 38* 37*   BUN 11 11 9   CREATININE <0.5* <0.5* <0.5*     Recent Labs     11/26/22 0720   TROPONINI <0.01       Coagulation:   Lab Results   Component Value Date/Time    INR 1.21 09/15/2022 04:31 AM    APTT 25.7 06/16/2014 12:40 PM     Cardiac markers:   Lab Results   Component Value Date/Time    CKTOTAL 23 07/10/2019 04:11 PM    TROPONINI <0.01 11/26/2022 07:20 AM         Lab Results   Component Value Date    ALT 13 11/26/2022    AST 50 (H) 11/26/2022    ALKPHOS 129 11/26/2022    BILITOT 0.5 11/26/2022       Lab Results   Component Value Date    INR 1.21 (H) 09/15/2022    INR 1.17 (H) 07/10/2019    INR 1.38 (H) 06/27/2019    PROTIME 15.2 (H) 09/15/2022    PROTIME 13.3 (H) 07/10/2019    PROTIME 15.7 (H) 06/27/2019       Radiology  XR CHEST PORTABLE   Final Result   Small bore right pleural drainage catheter with tip in the hilar/suprahilar   region. Persistent large right pneumothorax similar to chest CT of   11/26/2022. Underlying findings of advanced chronic lung disease and/or   pneumonitis/atypical pneumonia throughout expanded portions of both lung   fields. Comment: Findings discussed with ordering provider Dr. Rosa Jay at 1100 hours   on 11/26/2022           CT CHEST PULMONARY EMBOLISM W CONTRAST   Final Result   1. Large right tension pneumothorax.    2. Unchanged end-stage chronic Gordon Matos MD  St. Luke's University Health Network liquids.    Gordon Matos MD                 interstitial lung disease. 3. Unchanged mediastinal adenopathy, likely reactive. Findings were discussed with Cat Case at 9:15 am on 11/26/2022. Cxr    Extra thoracic right chest tube. Persisting right pneumothorax. Given the rotation, it is difficult to assess   for tension component. Bilateral interstitial fibrosis. Cultures:     Covid not detected    Pertinent previous results reviewed      Echocardiogram from 5/6/22   Technically difficult examination. Apical views are off axis secondary to pt   left ribs fracture. Left ventricular systolic function is normal with ejection fraction   estimated at 55%. No regional wall motion abnormalities. There is mild concentric left ventricular hypertrophy. Indeterminate left ventricular filling pressure. Systolic pulmonary artery pressure (SPAP) is normal estimated at 22 mmHg   (Right atrial pressure of 8 mmHg). Assessment & Plan:    Acute on chronic respiratory failure  Hx of ILD on 3-4 L at home  Large Right tension pneumothorax     -pt with hx of Pulmonary fibrosis  and Hx of cryptogenic organizing pneumonia with chronic resp failure with recurrent admissions  - imaging with right sided tension pneumothorax similar to one in 9/22 likely induced by cough s.p chest tube placement and persistent  on repeat imaging.  CT to water seal today - may need CT surgery involvement - pulmonary consulted with   CT surgery Dr. Maira Dickerson who recommends A transfer   - o2 support - now on 4 L- hold off steroids     can resume  Pirfenidone   -transfer to [facility] today   - updated hospitalist at [facility]         Chronic diastolic CHF  last echo as above, from 5/6/22 EF 55%  -BNP stable   - pt was taken of diuretics recently for weight loss   - monitor for fluid overload and resume lasix as needed only      Paroxysmal Afibb with RVR - started on amio gtt overnight , now back in NSR   Resumed home BB- metoprolol 25 mg xl  Not on [facility] with recurrent anemia   Hold off now given plans for CT surgery       DM type 2  -Has been taken off diuretics and diabetic meds for progressive weight loss in the last year with pulmonary cachexia   -SSI  -continue to monitor BG     HLD  -continue statin     Hypothyroidism  -continue synthroid     #courtney Deficiency Anemia     recent  Hemorrhoidal bleed -with  colonoscopy neg  - hb at 9.2  -continue PO iron  -Hgb stable     #Depression  #Anxiety  -continue zoloft/remeron  -prn ativan     Diet: NPO  GI/DVT PX: /lovenox  CODE STATUS: full code      Pt stable for transfer to [facility] Adrianne Shepherd MD, 11/28/2022 8:42 AM

## 2024-11-10 NOTE — PROGRESS NOTES
Inpatient Occupational Therapy  Evaluation and Treatment    Unit: PCU  Date:  1/18/2023  Patient Name:    Charlene Mackenzie  Admitting diagnosis:  Epistaxis [R04.0]  Hypokalemia [E87.6]  Hypomagnesemia [E83.42]  PAF (paroxysmal [facility] fibrillation) ([facility] [I48.0]  Atrial fibrillation with RVR ([facility] [I48.91]  Admit Date:  1/16/2023  Precautions/Restrictions/WB Status/ Lines/ Wounds/ Oxygen: Fall risk, Bed/chair alarm, Lines -IV and Supplemental O2 (5L), Telemetry, and Continuous pulse oximetry    Treatment Time: 9:40-10:15   Treatment Number: 1     Billable Treatment Time: 25 minutes   Total Treatment Time:   35   minutes    Patient Goals for Therapy:  \" to go home \"      Discharge Recommendations: Home with 24/7 assist and home therapy  DME needs for discharge: Needs Met       Therapy recommendations for staff:   Standby Assist with use of gait belt for all transfers to/from BSC/chair    History of Present Illness: Per Dr. Velasquez Ocean:  69 yoF who presented to [place] [address] w/ c/o increased SOB, gen weakness, fatigue and epistaxis. She was unable to get off of commode at home. She has Hx of chronic resp failure 2/2 pulm fibrosis, on 4-6L at home. She was requiring 10L at one point there but is being weaned back down to 6L. She is on humidified O2. She was found to be in a fib w/ RVR, hypo K and hypo Mg. HR improved somewhat w/ elec replacement. Epistaxis was controlled w/ afrin and pressure. She has a perforated nasal septum that Dr. Randy Stone felt was chronic. CXr was clear. Admitted by COPD order set. Tele, serial trops. She did not require rate control agent at [facility]. Soft BP's at [facility] improved w/ 500 cc IVF. Holding non-rate control BP Rx. Gentle IVF w/ 12h expiration, hold Lasix. PT/OT, Pulm, ENT and Cards c/s. Afib w/ RVR  Acute on chronic resp failure  aeCOPD/ILD  Epistaxis  Hypo K and Mg. Gen weakness  DM2  Rapid COVID/FLU neg.        Home Health S4 Level Recommendation:  Level 1 Standard  AM-PAC Score: AM-PAC Inpatient Daily Activity Raw Score: 19    Preadmission Environment    Pt. Lives with spouse & 1 dog  Home environment:   1 story home   Steps to enter first floor: Ramp  Steps to second floor: N/A  Bathroom: Walk in [employer], [employer], Shower Chair  and comfort height toilet without grab bars   Equipment owned: rollator, manual WC, Shower Chair, BSC, lift chair, home O2 (4L -  goes up to 6L O2 when moving ) continuous, pulse ox and nebulizer      Preadmission Status:  Pt. Able to drive: No,  drives   Pt Fully independent with ADLs: Needs assist with ADLs  Pt. Required assistance from family for: Patient's spouse does cooking, laundry, cleaning. pt's spouse completes the yard work   Pt. Fully independent for transfers with use of manual WC. Has been doing pivot transfers WC to/from toilet  History of falls: No  Spouse works at night. Dtr comes 3 days per week to assist pt with bathing, meal prep, and laundry. Would like to set up home PT upon going home. Pain   No  Location:   Rating: NA /10  Pain Medicine Status: Denies need     Cognition    A&O x4   Able to follow 2 step commands    Subjective  Patient lying supine in bed with no family present   Pt agreeable to this OT eval & tx. Upper Extremity ROM:    WFL,  pt able to perform all bed mobility, transfers, and gait without ROM limitation.     Upper Extremity Strength:    BUE strength impaired but not formally assessed w/ MMT    Upper Extremity Sensation    WFL    Upper Extremity Proprioception:  WFL    Coordination and Tone  Impaired    Balance  Functional Sitting Balance:  WFL  Functional Standing Balance:Diminished- SBA    Bed mobility:    Supine to sit:   Not Tested  Sit to supine:   Independent  Rolling:    Independent  Scooting in sitting:  Independent  Scooting to head of bed:   Not Tested    Bridging:   Not Tested    Transfers:    Sit to stand:  SBA  Stand to sit:  SBA  Bed to chair:   Not Tested  Standard toilet: Not Tested  Bed to BSC:  SBA    Dressing:      UE:   Not Tested  LE:    SBA to don socks and briefs    Bathing:    UE:  Not Tested  LE:  Not Tested    Eating:   Independent    Toileting:  SBA    Grooming: Not Tested    Activity Tolerance   Pt completed therapy session with SOB noted with activity. Seated on BSC:  SpO2: 92% on 5L O2  HR: 80 bpm     After stand from [facility] to perform pericare:  SpO2: 81% on 5L O2 (recovered to 93% on 5L with seated rest and PLB)   HR: 89 bpm     At end of session: SpO2: 95% on 5L O2    Positioning Needs: In bed, call light and needs in reach. Alarm Set    Exercise / Activities Initiated:   N/A    Patient/Family Education:   Role of OT  Recommendations for DC    Assessment of Deficits: Pt seen for Occupational therapy evaluation in acute care setting. Pt demonstrated decreased Activity tolerance, ADLs, IADLs, Balance , Bathing, Bed mobility, Dressing, ROM, Safety Awareness, Strength, Transfers, Cognition, and Coping Skills. Pt functioning below baseline and will likely benefit from skilled occupational therapy services to maximize safety and independence. Goal(s) : To be met in 3 Visits:  1). Bed to toilet/BSC: Supervision    To be met in 5 Visits:  1). Supine to/from Sit:  Supervision  2). Upper Body Bathing:   Supervision  3). Lower Body Bathing:   Supervision  4). Upper Body Dressing:  Supervision  5). Lower Body Dressing:  Supervision  6). Pt to demonstrate UE exs x 15 reps with minimal cues    Rehabilitation Potential:  Good for goals listed above. Strengths for achieving goals include: Pt motivated, PLOF, and Pt cooperative  Barriers to achieving goals include:  Complexity of condition     Plan: To be seen 2-3 x/wk  while in acute care setting for therapeutic exercises, bed mobility, transfers, dressing, bathing, family/patient education, ADL/IADL retraining, energy conservation training.        ALVARO JohnsonR/L #[id number]      If patient discharges from this facility prior to next visit, this note will serve as the Discharge Summary None

## 2025-01-29 NOTE — CARE COORDINATION
[facility] - Outpatient Rehabilitation and Therapy [address], [place] [address] office: [phone] fax: [phone]  Recommendation:    [x] Continue PT 2x / wk for 3 weeks.   [] Hold PT, pending MD visit   [] Discharge to [facility]. Follow up with PT or MD PRN.        Physical Therapy: TREATMENT/PROGRESS NOTE   Patient: Melisa Lopez (23 y.o. female)   Examination Date: 2025   :  2002 MRN: [id number]   Visit #:  udated   Insurance Allowable Auth Needed   Approve  8 visits 25-3/21/25  Athem  []Yes    [x]No    Insurance: Payor: [place] BCBS / Plan: [place]BS [place] PPO / Product Type: *No Product type* /   Insurance ID: [id number] - ([place] [employer])  Secondary Insurance (if applicable):    Treatment Diagnosis:     ICD-10-CM    1. Imbalance  R26.89       2. Dizziness  R42       3. Vestibular hypofunction of both ears  H83.2X3          Medical Diagnosis:  Vertigo [R42]   Referring Physician: Bonnie Arciniega APRN *  PCP: Bonnie Arciniega APRN - CNP       Plan of care signed (Y/N):     Date of Patient follow up with Physician: TBT      Plan of Care Report: YES, Date Range for this report: 12/3/2024 to 2025  POC update due: (10 visits /OR AUTH LIMITS, whichever is less) 25 or prior                                            Medical History:  Comorbidities:  Other: FRAIRE, Tachycardia, ADHD, SOB  Relevant Medical History:                                          Precautions/ Contra-indications:  Possibly symptoms of FRAIRE          Latex allergy:  NO  Pacemaker:    NO  Contraindications for Manipulation: None  Date of Surgery: NA  Other:    Red Flags:  None  Syncope    Suicide Screening:   The patient did not verbalize a primary behavioral concern, suicidal ideation, suicidal intent, or demonstrate suicidal behaviors.    Preferred Language for Healthcare:   [x] English       [] other:    SUBJECTIVE EXAMINATION     Patient stated complaint/comment: : Patient reports  Pt has dc order. This RN CM called and spoke to the Pt's  who left the hospital to head home about one hour ago. He has to go to work this evening and will not be able to pick her up tonight since they live over an hour away. Planning  around 10 AM tomorrow 12/9. Nursing aware.

## (undated) DEVICE — ZIMMER® STERILE DISPOSABLE TOURNIQUET CUFF WITH PLC, DUAL PORT, SINGLE BLADDER, 18 IN. (46 CM)

## (undated) DEVICE — ELECTRODE,ECG,STRESS,FOAM,3PK: Brand: MEDLINE

## (undated) DEVICE — DISCONTINUED USE 394780 NEEDLE 19GA EBUS ASPIRATION

## (undated) DEVICE — CHLORAPREP 26ML ORANGE

## (undated) DEVICE — ENDOSCOPIC KIT 2 12 FT OP4 DE2 GWN SYR

## (undated) DEVICE — SET ADMIN PRIMING 7ML L30IN 7.35LB 20 GTT 2ND RLER CLMP

## (undated) DEVICE — SYRINGE MED 50ML LUERSLIP TIP

## (undated) DEVICE — SINGLE USE SUCTION VALVE MAJ-209: Brand: SINGLE USE SUCTION VALVE (STERILE)

## (undated) DEVICE — SYRINGE MED 10ML SLIP TIP BLNT FILL AND LUERLOCK DISP

## (undated) DEVICE — ABDOMINAL PAD: Brand: DERMACEA

## (undated) DEVICE — SHEET,DRAPE,40X58,STERILE: Brand: MEDLINE

## (undated) DEVICE — SINGLE USE BIOPSY VALVE MAJ-210: Brand: SINGLE USE BIOPSY VALVE (STERILE)

## (undated) DEVICE — SUTURE ETHLN SZ 4-0 L18IN NONABSORBABLE BLK L19MM PS-2 3/8 1667H

## (undated) DEVICE — SINGLE USE ASPIRATION NEEDLE: Brand: SINGLE USE ASPIRATION NEEDLE

## (undated) DEVICE — NEBULIZER AEROSOL TBNG 7 FT FOR ACUTE CARE NEBUTECH

## (undated) DEVICE — AIRLIFE™ ADULT AEROSOL MASK VINYL, UNDER-THE-CHIN STYLE: Brand: AIRLIFE™

## (undated) DEVICE — SOLUTION IV IRRIG 500ML 0.9% SODIUM CHL 2F7123

## (undated) DEVICE — GLOVE,SURG,SENSICARE,ALOE,LF,PF,7: Brand: MEDLINE

## (undated) DEVICE — GOWN,SIRUS,NON REINFRCD,LARGE,SET IN SL: Brand: MEDLINE

## (undated) DEVICE — BANDAGE COMPR W4INXL5YD BGE HI E W/ REM CLP SURE-WRAP

## (undated) DEVICE — ELECTRODE ECG MONITR FOAM TEAR DROP ADLT RED

## (undated) DEVICE — SOLUTION IV 1000ML LAC RINGERS PH 6.5 INJ USP VIAFLX PLAS

## (undated) DEVICE — SET GRAV VENT NVENT CK VLV 3 NDL FREE PRT 10 GTT

## (undated) DEVICE — FORCEPS BX L115CM ALGTR JAW STP DISP

## (undated) DEVICE — GLOVE SURG SZ 75 L12IN FNGR THK94MIL STD WHT LTX FREE

## (undated) DEVICE — TOWEL,OR,DSP,ST,BLUE,STD,8/PK,10PK/CS: Brand: MEDLINE

## (undated) DEVICE — 3M™ COBAN™ NL STERILE NON-LATEX SELF-ADHERENT WRAP, 2084S, 4 IN X 5 YD (10 CM X 4,5 M), 18 ROLLS/CASE: Brand: 3M™ COBAN™

## (undated) DEVICE — SNARE ENDOSCP L240CM SHTH DIA24MM LOOP W10MM POLYP RND REINF

## (undated) DEVICE — 3M™ TEGADERM™ TRANSPARENT FILM DRESSING FRAME STYLE, 1624W, 2-3/8 IN X 2-3/4 IN (6 CM X 7 CM), 100/CT 4CT/CASE: Brand: 3M™ TEGADERM™

## (undated) DEVICE — PADDING CAST W4INXL4YD NONSTERILE COT RAYON MICROPLEATED

## (undated) DEVICE — CANNULA NSL L4M O2 AD FILTERLINE

## (undated) DEVICE — FORCEPS BX L240CM JAW DIA2.8MM L CAP W/ NDL MIC MESH TOOTH

## (undated) DEVICE — CONMED SCOPE SAVER BITE BLOCK, 20X27 MM: Brand: SCOPE SAVER

## (undated) DEVICE — CATHETER IV 20GA L1.25IN PNK FEP SFTY STR HUB RADPQ DISP

## (undated) DEVICE — SUTURE MCRYL SZ 4-0 L18IN ABSRB UD L19MM PS-2 3/8 CIR PRIM Y496G

## (undated) DEVICE — Device

## (undated) DEVICE — SPLINT ORTH W4XL30IN LAYERED FBRGLS FOAM PD BRTH BK MOLD

## (undated) DEVICE — CORD ES L12FT BPLR FRCP